# Patient Record
Sex: FEMALE | Race: WHITE | NOT HISPANIC OR LATINO | Employment: OTHER | URBAN - METROPOLITAN AREA
[De-identification: names, ages, dates, MRNs, and addresses within clinical notes are randomized per-mention and may not be internally consistent; named-entity substitution may affect disease eponyms.]

---

## 2017-01-20 ENCOUNTER — ALLSCRIPTS OFFICE VISIT (OUTPATIENT)
Dept: OTHER | Facility: OTHER | Age: 77
End: 2017-01-20

## 2017-01-26 ENCOUNTER — APPOINTMENT (OUTPATIENT)
Dept: LAB | Facility: CLINIC | Age: 77
End: 2017-01-26
Payer: MEDICARE

## 2017-01-26 ENCOUNTER — TRANSCRIBE ORDERS (OUTPATIENT)
Dept: LAB | Facility: CLINIC | Age: 77
End: 2017-01-26

## 2017-01-26 DIAGNOSIS — Z79.899 ENCOUNTER FOR LONG-TERM (CURRENT) USE OF OTHER MEDICATIONS: Primary | ICD-10-CM

## 2017-01-26 PROCEDURE — 82607 VITAMIN B-12: CPT

## 2017-01-26 PROCEDURE — 36415 COLL VENOUS BLD VENIPUNCTURE: CPT

## 2017-01-27 LAB — VIT B12 SERPL-MCNC: 306 PG/ML (ref 100–900)

## 2017-03-03 ENCOUNTER — GENERIC CONVERSION - ENCOUNTER (OUTPATIENT)
Dept: OTHER | Facility: OTHER | Age: 77
End: 2017-03-03

## 2017-03-03 LAB
LEFT EYE DIABETIC RETINOPATHY: NORMAL
RIGHT EYE DIABETIC RETINOPATHY: NORMAL

## 2017-03-22 RX ORDER — GABAPENTIN 300 MG/1
300 CAPSULE ORAL 2 TIMES DAILY
COMMUNITY
End: 2018-03-01

## 2017-03-22 RX ORDER — BUMETANIDE 2 MG/1
2 TABLET ORAL EVERY MORNING
COMMUNITY
End: 2018-10-13 | Stop reason: SDUPTHER

## 2017-03-27 ENCOUNTER — HOSPITAL ENCOUNTER (OUTPATIENT)
Facility: AMBULARY SURGERY CENTER | Age: 77
Setting detail: OUTPATIENT SURGERY
Discharge: HOME | End: 2017-03-27
Attending: OPHTHALMOLOGY | Admitting: OPHTHALMOLOGY
Payer: MEDICARE

## 2017-03-27 ENCOUNTER — ANESTHESIA EVENT (OUTPATIENT)
Dept: PERIOP | Facility: AMBULARY SURGERY CENTER | Age: 77
End: 2017-03-27
Payer: MEDICARE

## 2017-03-27 ENCOUNTER — ANESTHESIA (OUTPATIENT)
Dept: PERIOP | Facility: AMBULARY SURGERY CENTER | Age: 77
End: 2017-03-27
Payer: MEDICARE

## 2017-03-27 VITALS
WEIGHT: 210 LBS | HEIGHT: 62 IN | BODY MASS INDEX: 38.64 KG/M2 | OXYGEN SATURATION: 95 % | RESPIRATION RATE: 20 BRPM | HEART RATE: 80 BPM | SYSTOLIC BLOOD PRESSURE: 157 MMHG | TEMPERATURE: 97.4 F | DIASTOLIC BLOOD PRESSURE: 65 MMHG

## 2017-03-27 PROCEDURE — V2632 POST CHMBR INTRAOCULAR LENS: HCPCS | Performed by: OPHTHALMOLOGY

## 2017-03-27 DEVICE — IOL SN60WF 19.5: Type: IMPLANTABLE DEVICE | Site: EYE | Status: FUNCTIONAL

## 2017-03-27 RX ORDER — CYCLOPENTOLATE HYDROCHLORIDE 10 MG/ML
1 SOLUTION/ DROPS OPHTHALMIC
Status: COMPLETED | OUTPATIENT
Start: 2017-03-27 | End: 2017-03-27

## 2017-03-27 RX ORDER — TETRACAINE HYDROCHLORIDE 5 MG/ML
1 SOLUTION OPHTHALMIC ONCE
Status: COMPLETED | OUTPATIENT
Start: 2017-03-27 | End: 2017-03-27

## 2017-03-27 RX ORDER — PHENYLEPHRINE HCL 2.5 %
1 DROPS OPHTHALMIC (EYE)
Status: COMPLETED | OUTPATIENT
Start: 2017-03-27 | End: 2017-03-27

## 2017-03-27 RX ORDER — LIDOCAINE HYDROCHLORIDE 10 MG/ML
INJECTION, SOLUTION EPIDURAL; INFILTRATION; INTRACAUDAL; PERINEURAL AS NEEDED
Status: DISCONTINUED | OUTPATIENT
Start: 2017-03-27 | End: 2017-03-27 | Stop reason: HOSPADM

## 2017-03-27 RX ORDER — KETOROLAC TROMETHAMINE 5 MG/ML
1 SOLUTION OPHTHALMIC
Status: DISCONTINUED | OUTPATIENT
Start: 2017-03-28 | End: 2017-03-27 | Stop reason: HOSPADM

## 2017-03-27 RX ORDER — GATIFLOXACIN 5 MG/ML
SOLUTION/ DROPS OPHTHALMIC AS NEEDED
Status: DISCONTINUED | OUTPATIENT
Start: 2017-03-27 | End: 2017-03-27 | Stop reason: HOSPADM

## 2017-03-27 RX ORDER — KETOROLAC TROMETHAMINE 5 MG/ML
1 SOLUTION OPHTHALMIC 4 TIMES DAILY
Qty: 6 ML | Refills: 0
Start: 2017-03-27 | End: 2018-02-01

## 2017-03-27 RX ORDER — SODIUM CHLORIDE 9 MG/ML
INJECTION, SOLUTION INTRAVENOUS CONTINUOUS PRN
Status: DISCONTINUED | OUTPATIENT
Start: 2017-03-27 | End: 2017-03-27 | Stop reason: SURG

## 2017-03-27 RX ORDER — LIDOCAINE HYDROCHLORIDE 20 MG/ML
1 JELLY TOPICAL
Status: COMPLETED | OUTPATIENT
Start: 2017-03-27 | End: 2017-03-27

## 2017-03-27 RX ORDER — GATIFLOXACIN 5 MG/ML
1 SOLUTION/ DROPS OPHTHALMIC 2 TIMES DAILY
Qty: 3 ML | Refills: 0
Start: 2017-03-27 | End: 2017-04-26

## 2017-03-27 RX ORDER — TETRACAINE HYDROCHLORIDE 5 MG/ML
SOLUTION OPHTHALMIC AS NEEDED
Status: DISCONTINUED | OUTPATIENT
Start: 2017-03-27 | End: 2017-03-27 | Stop reason: HOSPADM

## 2017-03-27 RX ORDER — MIDAZOLAM HYDROCHLORIDE 1 MG/ML
INJECTION INTRAMUSCULAR; INTRAVENOUS AS NEEDED
Status: DISCONTINUED | OUTPATIENT
Start: 2017-03-27 | End: 2017-03-27 | Stop reason: SURG

## 2017-03-27 RX ADMIN — CYCLOPENTOLATE HYDROCHLORIDE 1 DROP: 10 SOLUTION/ DROPS OPHTHALMIC at 11:30

## 2017-03-27 RX ADMIN — KETOROLAC TROMETHAMINE 1 DROP: 5 SOLUTION OPHTHALMIC at 11:45

## 2017-03-27 RX ADMIN — PHENYLEPHRINE HYDROCHLORIDE 1 DROP: 25 SOLUTION/ DROPS OPHTHALMIC at 11:30

## 2017-03-27 RX ADMIN — PHENYLEPHRINE HYDROCHLORIDE 1 DROP: 25 SOLUTION/ DROPS OPHTHALMIC at 12:00

## 2017-03-27 RX ADMIN — KETOROLAC TROMETHAMINE 1 DROP: 5 SOLUTION OPHTHALMIC at 11:31

## 2017-03-27 RX ADMIN — LIDOCAINE HYDROCHLORIDE 1 APPLICATION: 20 JELLY TOPICAL at 11:45

## 2017-03-27 RX ADMIN — KETOROLAC TROMETHAMINE 1 DROP: 5 SOLUTION OPHTHALMIC at 12:00

## 2017-03-27 RX ADMIN — MIDAZOLAM HYDROCHLORIDE 1 MG: 1 INJECTION, SOLUTION INTRAMUSCULAR; INTRAVENOUS at 12:07

## 2017-03-27 RX ADMIN — CYCLOPENTOLATE HYDROCHLORIDE 1 DROP: 10 SOLUTION/ DROPS OPHTHALMIC at 11:45

## 2017-03-27 RX ADMIN — LIDOCAINE HYDROCHLORIDE 1 APPLICATION: 20 JELLY TOPICAL at 11:15

## 2017-03-27 RX ADMIN — TETRACAINE HYDROCHLORIDE 1 DROP: 5 SOLUTION OPHTHALMIC at 11:15

## 2017-03-27 RX ADMIN — MIDAZOLAM HYDROCHLORIDE 1 MG: 1 INJECTION, SOLUTION INTRAMUSCULAR; INTRAVENOUS at 12:06

## 2017-03-27 RX ADMIN — CYCLOPENTOLATE HYDROCHLORIDE 1 DROP: 10 SOLUTION/ DROPS OPHTHALMIC at 12:00

## 2017-03-27 RX ADMIN — LIDOCAINE HYDROCHLORIDE 1 APPLICATION: 20 JELLY TOPICAL at 12:00

## 2017-03-27 RX ADMIN — KETOROLAC TROMETHAMINE 1 DROP: 5 SOLUTION OPHTHALMIC at 11:15

## 2017-03-27 RX ADMIN — CYCLOPENTOLATE HYDROCHLORIDE 1 DROP: 10 SOLUTION/ DROPS OPHTHALMIC at 11:15

## 2017-03-27 RX ADMIN — LIDOCAINE HYDROCHLORIDE 1 APPLICATION: 20 JELLY TOPICAL at 11:31

## 2017-03-27 RX ADMIN — PHENYLEPHRINE HYDROCHLORIDE 1 DROP: 25 SOLUTION/ DROPS OPHTHALMIC at 11:15

## 2017-03-27 RX ADMIN — PHENYLEPHRINE HYDROCHLORIDE 1 DROP: 25 SOLUTION/ DROPS OPHTHALMIC at 11:45

## 2017-03-27 RX ADMIN — SODIUM CHLORIDE: 0.9 INJECTION, SOLUTION INTRAVENOUS at 12:07

## 2017-04-06 ENCOUNTER — ALLSCRIPTS OFFICE VISIT (OUTPATIENT)
Dept: OTHER | Facility: OTHER | Age: 77
End: 2017-04-06

## 2017-04-11 ENCOUNTER — GENERIC CONVERSION - ENCOUNTER (OUTPATIENT)
Dept: OTHER | Facility: OTHER | Age: 77
End: 2017-04-11

## 2017-04-11 ENCOUNTER — ALLSCRIPTS OFFICE VISIT (OUTPATIENT)
Dept: OTHER | Facility: OTHER | Age: 77
End: 2017-04-11

## 2017-04-11 DIAGNOSIS — R10.9 ABDOMINAL PAIN: ICD-10-CM

## 2017-04-11 DIAGNOSIS — Z12.31 ENCOUNTER FOR SCREENING MAMMOGRAM FOR MALIGNANT NEOPLASM OF BREAST: ICD-10-CM

## 2017-04-11 DIAGNOSIS — R60.9 EDEMA: ICD-10-CM

## 2017-04-11 DIAGNOSIS — I10 ESSENTIAL (PRIMARY) HYPERTENSION: ICD-10-CM

## 2017-04-11 DIAGNOSIS — E03.9 HYPOTHYROIDISM: ICD-10-CM

## 2017-05-15 ENCOUNTER — GENERIC CONVERSION - ENCOUNTER (OUTPATIENT)
Dept: OTHER | Facility: OTHER | Age: 77
End: 2017-05-15

## 2017-05-15 ENCOUNTER — APPOINTMENT (OUTPATIENT)
Dept: LAB | Facility: CLINIC | Age: 77
End: 2017-05-15
Payer: MEDICARE

## 2017-05-15 DIAGNOSIS — R60.9 EDEMA: ICD-10-CM

## 2017-05-15 DIAGNOSIS — E03.9 HYPOTHYROIDISM: ICD-10-CM

## 2017-05-15 DIAGNOSIS — R10.9 ABDOMINAL PAIN: ICD-10-CM

## 2017-05-15 DIAGNOSIS — I10 ESSENTIAL (PRIMARY) HYPERTENSION: ICD-10-CM

## 2017-05-15 LAB
ALBUMIN SERPL BCP-MCNC: 3.9 G/DL (ref 3.5–5)
ALP SERPL-CCNC: 87 U/L (ref 46–116)
ALT SERPL W P-5'-P-CCNC: 18 U/L (ref 12–78)
ANION GAP SERPL CALCULATED.3IONS-SCNC: 9 MMOL/L (ref 4–13)
AST SERPL W P-5'-P-CCNC: 12 U/L (ref 5–45)
BASOPHILS # BLD AUTO: 0.01 THOUSANDS/ΜL (ref 0–0.1)
BASOPHILS NFR BLD AUTO: 0 % (ref 0–1)
BILIRUB SERPL-MCNC: 0.54 MG/DL (ref 0.2–1)
BUN SERPL-MCNC: 74 MG/DL (ref 5–25)
CALCIUM SERPL-MCNC: 9.5 MG/DL (ref 8.3–10.1)
CHLORIDE SERPL-SCNC: 102 MMOL/L (ref 100–108)
CHOLEST SERPL-MCNC: 220 MG/DL (ref 50–200)
CO2 SERPL-SCNC: 28 MMOL/L (ref 21–32)
CREAT SERPL-MCNC: 1.85 MG/DL (ref 0.6–1.3)
EOSINOPHIL # BLD AUTO: 0.13 THOUSAND/ΜL (ref 0–0.61)
EOSINOPHIL NFR BLD AUTO: 2 % (ref 0–6)
ERYTHROCYTE [DISTWIDTH] IN BLOOD BY AUTOMATED COUNT: 13.9 % (ref 11.6–15.1)
GFR SERPL CREATININE-BSD FRML MDRD: 26.4 ML/MIN/1.73SQ M
GLUCOSE P FAST SERPL-MCNC: 99 MG/DL (ref 65–99)
HCT VFR BLD AUTO: 37.4 % (ref 34.8–46.1)
HDLC SERPL-MCNC: 51 MG/DL (ref 40–60)
HGB BLD-MCNC: 11.9 G/DL (ref 11.5–15.4)
LDLC SERPL CALC-MCNC: 130 MG/DL (ref 0–100)
LYMPHOCYTES # BLD AUTO: 1.3 THOUSANDS/ΜL (ref 0.6–4.47)
LYMPHOCYTES NFR BLD AUTO: 19 % (ref 14–44)
MCH RBC QN AUTO: 28.7 PG (ref 26.8–34.3)
MCHC RBC AUTO-ENTMCNC: 31.8 G/DL (ref 31.4–37.4)
MCV RBC AUTO: 90 FL (ref 82–98)
MONOCYTES # BLD AUTO: 0.62 THOUSAND/ΜL (ref 0.17–1.22)
MONOCYTES NFR BLD AUTO: 9 % (ref 4–12)
NEUTROPHILS # BLD AUTO: 4.74 THOUSANDS/ΜL (ref 1.85–7.62)
NEUTS SEG NFR BLD AUTO: 70 % (ref 43–75)
NRBC BLD AUTO-RTO: 0 /100 WBCS
PLATELET # BLD AUTO: 197 THOUSANDS/UL (ref 149–390)
PMV BLD AUTO: 11.7 FL (ref 8.9–12.7)
POTASSIUM SERPL-SCNC: 4.7 MMOL/L (ref 3.5–5.3)
PROT SERPL-MCNC: 7.6 G/DL (ref 6.4–8.2)
RBC # BLD AUTO: 4.15 MILLION/UL (ref 3.81–5.12)
SODIUM SERPL-SCNC: 139 MMOL/L (ref 136–145)
TRIGL SERPL-MCNC: 194 MG/DL
TSH SERPL DL<=0.05 MIU/L-ACNC: 1.3 UIU/ML (ref 0.36–3.74)
WBC # BLD AUTO: 6.83 THOUSAND/UL (ref 4.31–10.16)

## 2017-05-15 PROCEDURE — 85025 COMPLETE CBC W/AUTO DIFF WBC: CPT

## 2017-05-15 PROCEDURE — 84443 ASSAY THYROID STIM HORMONE: CPT

## 2017-05-15 PROCEDURE — 80053 COMPREHEN METABOLIC PANEL: CPT

## 2017-05-15 PROCEDURE — 36415 COLL VENOUS BLD VENIPUNCTURE: CPT

## 2017-05-15 PROCEDURE — 80061 LIPID PANEL: CPT

## 2017-05-19 ENCOUNTER — ALLSCRIPTS OFFICE VISIT (OUTPATIENT)
Dept: OTHER | Facility: OTHER | Age: 77
End: 2017-05-19

## 2017-05-23 ENCOUNTER — HOSPITAL ENCOUNTER (OUTPATIENT)
Dept: RADIOLOGY | Facility: HOSPITAL | Age: 77
Discharge: HOME | End: 2017-05-23
Attending: FAMILY MEDICINE
Payer: MEDICARE

## 2017-05-23 ENCOUNTER — GENERIC CONVERSION - ENCOUNTER (OUTPATIENT)
Dept: OTHER | Facility: OTHER | Age: 77
End: 2017-05-23

## 2017-05-23 DIAGNOSIS — Z12.31 ENCOUNTER FOR SCREENING MAMMOGRAM FOR MALIGNANT NEOPLASM OF BREAST: ICD-10-CM

## 2017-05-23 PROCEDURE — G0202 SCR MAMMO BI INCL CAD: HCPCS

## 2017-06-02 ENCOUNTER — ALLSCRIPTS OFFICE VISIT (OUTPATIENT)
Dept: OTHER | Facility: OTHER | Age: 77
End: 2017-06-02

## 2017-06-12 ENCOUNTER — ALLSCRIPTS OFFICE VISIT (OUTPATIENT)
Dept: OTHER | Facility: OTHER | Age: 77
End: 2017-06-12

## 2017-06-12 DIAGNOSIS — N20.0 CALCULUS OF KIDNEY: ICD-10-CM

## 2017-06-12 DIAGNOSIS — N18.30 CHRONIC KIDNEY DISEASE, STAGE III (MODERATE) (HCC): ICD-10-CM

## 2017-06-12 DIAGNOSIS — M10.9 GOUT: ICD-10-CM

## 2017-06-12 LAB
BILIRUB UR QL STRIP: NORMAL
CLARITY UR: NORMAL
COLOR UR: YELLOW
GLUCOSE (HISTORICAL): NORMAL
HGB UR QL STRIP.AUTO: NORMAL
KETONES UR STRIP-MCNC: NORMAL MG/DL
LEUKOCYTE ESTERASE UR QL STRIP: NORMAL
NITRITE UR QL STRIP: NORMAL
PH UR STRIP.AUTO: 5 [PH]
PROT UR STRIP-MCNC: NORMAL MG/DL
SP GR UR STRIP.AUTO: 1.01
UROBILINOGEN UR QL STRIP.AUTO: 0.2

## 2017-06-13 ENCOUNTER — APPOINTMENT (OUTPATIENT)
Dept: LAB | Facility: CLINIC | Age: 77
End: 2017-06-13
Payer: MEDICARE

## 2017-06-13 DIAGNOSIS — N18.30 CHRONIC KIDNEY DISEASE, STAGE III (MODERATE) (HCC): ICD-10-CM

## 2017-06-13 LAB
ANION GAP SERPL CALCULATED.3IONS-SCNC: 6 MMOL/L (ref 4–13)
BACTERIA UR QL AUTO: ABNORMAL /HPF
BILIRUB UR QL STRIP: NEGATIVE
BUN SERPL-MCNC: 30 MG/DL (ref 5–25)
C3 SERPL-MCNC: 142 MG/DL (ref 90–180)
C4 SERPL-MCNC: 38 MG/DL (ref 10–40)
CALCIUM SERPL-MCNC: 8.9 MG/DL (ref 8.3–10.1)
CHLORIDE SERPL-SCNC: 107 MMOL/L (ref 100–108)
CLARITY UR: CLEAR
CO2 SERPL-SCNC: 29 MMOL/L (ref 21–32)
COLOR UR: YELLOW
CREAT SERPL-MCNC: 1.46 MG/DL (ref 0.6–1.3)
CREAT UR-MCNC: 46.1 MG/DL
ERYTHROCYTE [DISTWIDTH] IN BLOOD BY AUTOMATED COUNT: 13.9 % (ref 11.6–15.1)
GFR SERPL CREATININE-BSD FRML MDRD: 34.7 ML/MIN/1.73SQ M
GLUCOSE SERPL-MCNC: 100 MG/DL (ref 65–140)
GLUCOSE UR STRIP-MCNC: NEGATIVE MG/DL
HCT VFR BLD AUTO: 37.8 % (ref 34.8–46.1)
HGB BLD-MCNC: 11.9 G/DL (ref 11.5–15.4)
HGB UR QL STRIP.AUTO: NEGATIVE
HYALINE CASTS #/AREA URNS LPF: ABNORMAL /LPF
KETONES UR STRIP-MCNC: NEGATIVE MG/DL
LEUKOCYTE ESTERASE UR QL STRIP: ABNORMAL
MCH RBC QN AUTO: 28.7 PG (ref 26.8–34.3)
MCHC RBC AUTO-ENTMCNC: 31.5 G/DL (ref 31.4–37.4)
MCV RBC AUTO: 91 FL (ref 82–98)
NITRITE UR QL STRIP: NEGATIVE
NON-SQ EPI CELLS URNS QL MICRO: ABNORMAL /HPF
PH UR STRIP.AUTO: 6 [PH] (ref 4.5–8)
PLATELET # BLD AUTO: 202 THOUSANDS/UL (ref 149–390)
PMV BLD AUTO: 11.7 FL (ref 8.9–12.7)
POTASSIUM SERPL-SCNC: 4.3 MMOL/L (ref 3.5–5.3)
PROT UR STRIP-MCNC: NEGATIVE MG/DL
PROT UR-MCNC: <6 MG/DL
PROT/CREAT UR: <0.13 MG/G{CREAT} (ref 0–0.1)
RBC # BLD AUTO: 4.15 MILLION/UL (ref 3.81–5.12)
RBC #/AREA URNS AUTO: ABNORMAL /HPF
SODIUM SERPL-SCNC: 142 MMOL/L (ref 136–145)
SP GR UR STRIP.AUTO: 1.01 (ref 1–1.03)
UROBILINOGEN UR QL STRIP.AUTO: 0.2 E.U./DL
WBC # BLD AUTO: 8.03 THOUSAND/UL (ref 4.31–10.16)
WBC #/AREA URNS AUTO: ABNORMAL /HPF

## 2017-06-13 PROCEDURE — 86160 COMPLEMENT ANTIGEN: CPT

## 2017-06-13 PROCEDURE — 85027 COMPLETE CBC AUTOMATED: CPT

## 2017-06-13 PROCEDURE — 84156 ASSAY OF PROTEIN URINE: CPT

## 2017-06-13 PROCEDURE — 87086 URINE CULTURE/COLONY COUNT: CPT

## 2017-06-13 PROCEDURE — 36415 COLL VENOUS BLD VENIPUNCTURE: CPT

## 2017-06-13 PROCEDURE — 80048 BASIC METABOLIC PNL TOTAL CA: CPT

## 2017-06-13 PROCEDURE — 82570 ASSAY OF URINE CREATININE: CPT

## 2017-06-13 PROCEDURE — 81001 URINALYSIS AUTO W/SCOPE: CPT

## 2017-06-14 LAB — BACTERIA UR CULT: NORMAL

## 2017-06-15 ENCOUNTER — ALLSCRIPTS OFFICE VISIT (OUTPATIENT)
Dept: OTHER | Facility: OTHER | Age: 77
End: 2017-06-15

## 2017-06-16 ENCOUNTER — HOSPITAL ENCOUNTER (OUTPATIENT)
Dept: RADIOLOGY | Facility: HOSPITAL | Age: 77
Discharge: HOME | End: 2017-06-16
Attending: INTERNAL MEDICINE
Payer: MEDICARE

## 2017-06-16 ENCOUNTER — GENERIC CONVERSION - ENCOUNTER (OUTPATIENT)
Dept: OTHER | Facility: OTHER | Age: 77
End: 2017-06-16

## 2017-06-16 DIAGNOSIS — N18.30 CHRONIC KIDNEY DISEASE, STAGE III (MODERATE) (HCC): ICD-10-CM

## 2017-06-16 PROCEDURE — 76770 US EXAM ABDO BACK WALL COMP: CPT

## 2017-06-21 ENCOUNTER — GENERIC CONVERSION - ENCOUNTER (OUTPATIENT)
Dept: OTHER | Facility: OTHER | Age: 77
End: 2017-06-21

## 2017-06-23 ENCOUNTER — APPOINTMENT (OUTPATIENT)
Dept: LAB | Facility: HOSPITAL | Age: 77
End: 2017-06-23
Attending: FAMILY MEDICINE
Payer: MEDICARE

## 2017-06-23 ENCOUNTER — GENERIC CONVERSION - ENCOUNTER (OUTPATIENT)
Dept: OTHER | Facility: OTHER | Age: 77
End: 2017-06-23

## 2017-06-23 ENCOUNTER — HOSPITAL ENCOUNTER (OUTPATIENT)
Dept: RADIOLOGY | Facility: HOSPITAL | Age: 77
Discharge: HOME | End: 2017-06-23
Attending: FAMILY MEDICINE
Payer: MEDICARE

## 2017-06-23 ENCOUNTER — TRANSCRIBE ORDERS (OUTPATIENT)
Dept: ADMINISTRATIVE | Facility: HOSPITAL | Age: 77
End: 2017-06-23

## 2017-06-23 ENCOUNTER — ALLSCRIPTS OFFICE VISIT (OUTPATIENT)
Dept: OTHER | Facility: OTHER | Age: 77
End: 2017-06-23

## 2017-06-23 DIAGNOSIS — M10.9 GOUT: ICD-10-CM

## 2017-06-23 LAB
ANION GAP SERPL CALCULATED.3IONS-SCNC: 7 MMOL/L (ref 4–13)
BUN SERPL-MCNC: 26 MG/DL (ref 5–25)
CALCIUM SERPL-MCNC: 9.6 MG/DL (ref 8.3–10.1)
CHLORIDE SERPL-SCNC: 104 MMOL/L (ref 100–108)
CO2 SERPL-SCNC: 29 MMOL/L (ref 21–32)
CREAT SERPL-MCNC: 1.29 MG/DL (ref 0.6–1.3)
GFR SERPL CREATININE-BSD FRML MDRD: 40.1 ML/MIN/1.73SQ M
GLUCOSE P FAST SERPL-MCNC: 93 MG/DL (ref 65–99)
POTASSIUM SERPL-SCNC: 4.5 MMOL/L (ref 3.5–5.3)
SODIUM SERPL-SCNC: 140 MMOL/L (ref 136–145)
URATE SERPL-MCNC: 9.5 MG/DL (ref 2–6.8)

## 2017-06-23 PROCEDURE — 73110 X-RAY EXAM OF WRIST: CPT

## 2017-06-23 PROCEDURE — 36415 COLL VENOUS BLD VENIPUNCTURE: CPT

## 2017-06-23 PROCEDURE — 84550 ASSAY OF BLOOD/URIC ACID: CPT

## 2017-06-23 PROCEDURE — 80048 BASIC METABOLIC PNL TOTAL CA: CPT

## 2017-07-24 ENCOUNTER — APPOINTMENT (OUTPATIENT)
Dept: LAB | Facility: CLINIC | Age: 77
End: 2017-07-24
Payer: MEDICARE

## 2017-07-24 ENCOUNTER — TRANSCRIBE ORDERS (OUTPATIENT)
Dept: ADMINISTRATIVE | Facility: HOSPITAL | Age: 77
End: 2017-07-24

## 2017-07-24 DIAGNOSIS — N20.0 CALCULUS OF KIDNEY: ICD-10-CM

## 2017-07-24 DIAGNOSIS — N20.0 URIC ACID NEPHROLITHIASIS: Primary | ICD-10-CM

## 2017-07-24 DIAGNOSIS — N18.30 CHRONIC KIDNEY DISEASE, STAGE III (MODERATE) (HCC): ICD-10-CM

## 2017-07-24 LAB
ANION GAP SERPL CALCULATED.3IONS-SCNC: 7 MMOL/L (ref 4–13)
BACTERIA UR QL AUTO: ABNORMAL /HPF
BILIRUB UR QL STRIP: NEGATIVE
BUN SERPL-MCNC: 29 MG/DL (ref 5–25)
CALCIUM SERPL-MCNC: 9 MG/DL (ref 8.3–10.1)
CHLORIDE SERPL-SCNC: 107 MMOL/L (ref 100–108)
CLARITY UR: CLEAR
CO2 SERPL-SCNC: 27 MMOL/L (ref 21–32)
COLOR UR: YELLOW
CREAT SERPL-MCNC: 1.24 MG/DL (ref 0.6–1.3)
ERYTHROCYTE [DISTWIDTH] IN BLOOD BY AUTOMATED COUNT: 14.4 % (ref 11.6–15.1)
GFR SERPL CREATININE-BSD FRML MDRD: 42 ML/MIN/1.73SQ M
GLUCOSE P FAST SERPL-MCNC: 94 MG/DL (ref 65–99)
GLUCOSE UR STRIP-MCNC: NEGATIVE MG/DL
HCT VFR BLD AUTO: 37.6 % (ref 34.8–46.1)
HGB BLD-MCNC: 12 G/DL (ref 11.5–15.4)
HGB UR QL STRIP.AUTO: NEGATIVE
HYALINE CASTS #/AREA URNS LPF: ABNORMAL /LPF
KETONES UR STRIP-MCNC: NEGATIVE MG/DL
LEUKOCYTE ESTERASE UR QL STRIP: ABNORMAL
MCH RBC QN AUTO: 28.4 PG (ref 26.8–34.3)
MCHC RBC AUTO-ENTMCNC: 31.9 G/DL (ref 31.4–37.4)
MCV RBC AUTO: 89 FL (ref 82–98)
NITRITE UR QL STRIP: NEGATIVE
NON-SQ EPI CELLS URNS QL MICRO: ABNORMAL /HPF
PH UR STRIP.AUTO: 6.5 [PH] (ref 4.5–8)
PLATELET # BLD AUTO: 183 THOUSANDS/UL (ref 149–390)
PMV BLD AUTO: 11.5 FL (ref 8.9–12.7)
POTASSIUM SERPL-SCNC: 4.1 MMOL/L (ref 3.5–5.3)
PROT UR STRIP-MCNC: NEGATIVE MG/DL
RBC # BLD AUTO: 4.22 MILLION/UL (ref 3.81–5.12)
RBC #/AREA URNS AUTO: ABNORMAL /HPF
SODIUM SERPL-SCNC: 141 MMOL/L (ref 136–145)
SP GR UR STRIP.AUTO: 1.01 (ref 1–1.03)
UROBILINOGEN UR QL STRIP.AUTO: 0.2 E.U./DL
WBC # BLD AUTO: 9.98 THOUSAND/UL (ref 4.31–10.16)
WBC #/AREA URNS AUTO: ABNORMAL /HPF

## 2017-07-24 PROCEDURE — 85027 COMPLETE CBC AUTOMATED: CPT

## 2017-07-24 PROCEDURE — 87205 SMEAR GRAM STAIN: CPT

## 2017-07-24 PROCEDURE — 36415 COLL VENOUS BLD VENIPUNCTURE: CPT

## 2017-07-24 PROCEDURE — 80048 BASIC METABOLIC PNL TOTAL CA: CPT

## 2017-07-24 PROCEDURE — 81001 URINALYSIS AUTO W/SCOPE: CPT

## 2017-07-25 LAB — EOSINOPHIL NFR URNS MANUAL: 0 %

## 2017-07-27 ENCOUNTER — ALLSCRIPTS OFFICE VISIT (OUTPATIENT)
Dept: OTHER | Facility: OTHER | Age: 77
End: 2017-07-27

## 2017-07-27 DIAGNOSIS — N18.30 CHRONIC KIDNEY DISEASE, STAGE III (MODERATE) (HCC): ICD-10-CM

## 2017-08-18 ENCOUNTER — ALLSCRIPTS OFFICE VISIT (OUTPATIENT)
Dept: OTHER | Facility: OTHER | Age: 77
End: 2017-08-18

## 2017-09-12 ENCOUNTER — ALLSCRIPTS OFFICE VISIT (OUTPATIENT)
Dept: OTHER | Facility: OTHER | Age: 77
End: 2017-09-12

## 2017-10-24 ENCOUNTER — ALLSCRIPTS OFFICE VISIT (OUTPATIENT)
Dept: OTHER | Facility: OTHER | Age: 77
End: 2017-10-24

## 2017-10-24 DIAGNOSIS — M10.9 GOUT: ICD-10-CM

## 2017-10-24 DIAGNOSIS — I50.33 ACUTE ON CHRONIC DIASTOLIC CONGESTIVE HEART FAILURE (HCC): ICD-10-CM

## 2017-10-24 DIAGNOSIS — N18.30 CHRONIC KIDNEY DISEASE, STAGE III (MODERATE) (HCC): ICD-10-CM

## 2017-10-24 DIAGNOSIS — I10 ESSENTIAL (PRIMARY) HYPERTENSION: ICD-10-CM

## 2017-10-24 DIAGNOSIS — Z13.820 ENCOUNTER FOR SCREENING FOR OSTEOPOROSIS: ICD-10-CM

## 2017-10-24 DIAGNOSIS — E79.0 HYPERURICEMIA WITHOUT SIGNS OF INFLAMMATORY ARTHRITIS AND TOPHACEOUS DISEASE: ICD-10-CM

## 2017-10-24 DIAGNOSIS — E03.9 HYPOTHYROIDISM: ICD-10-CM

## 2017-10-25 NOTE — PROGRESS NOTES
Assessment  1  Benign essential hypertension (401 1) (I10)   2  Acute on chronic diastolic congestive heart failure (428 33,428 0) (I50 33)   3  Gout (274 9) (M10 9)   4  Hypothyroidism (244 9) (E03 9)   5  Chronic low back pain (724 2,338 29) (M54 5,G89 29)   6  Spinal stenosis of lumbar region (724 02) (M48 061)    Plan  Acute on chronic diastolic congestive heart failure, Benign essential hypertension, CKD  (chronic kidney disease), stage III, Gout, Hyperuricemia, Hypothyroidism    · (1) HEMOGLOBIN A1C; Status:Active; Requested SHW:[id number];   Acute on chronic diastolic congestive heart failure, Benign essential hypertension, CKD  (chronic kidney disease), stage III, Gout, Hypothyroidism    · (1) CBC/PLT/DIFF; Status:Active; Requested NSZ:[id number];    · (1) COMPREHENSIVE METABOLIC PANEL; Status:Active; Requested YLI:[id number];    · (1) LIPID PANEL, FASTING; Status:Active; Requested ENA:[id number];    · (1) URIC ACID; Status:Active; Requested RNF:[id number];    · (Q) TSH, 3RD GENERATION W/REFLEX TO FT4; Status:Active; Requested  OCF:[id number];    · Follow-up visit in 2 weeks Evaluation and Treatment  Follow-up  Status: Hold For -  Scheduling  Requested for: 41GSI5732  Chronic low back pain, Lumbar canal stenosis, Spinal stenosis of lumbar region    · 1 - Tanesha Galeano MD, Giovana GARSIA (Pain Management) Co-Management  *  Status: Active   Requested for: 20UUW4708  Care Summary provided  : Yes    Discussion/Summary  Discussion Summary:   Patient will continue taking on her chronic medications as prescribed by her cardiologist and nephrologist can refer her to pain management for her symptoms of low back pain, and history of lumbar stenosis  Patient states that 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  who has multiple medical issues and is wheelchair-bound  experiences a lot of pain and difficulty managing him at home, due to her back pain  also advised her basic labs and a follow-up after 2 weeks to review these     Counseling Documentation With Imm: The patient was counseled regarding risk factor reductions,-- impressions  Medication SE Review and Pt Understands Tx: Possible side effects of new medications were reviewed with the patient/guardian today  Chief Complaint  Chief Complaint Free Text Note Form: New patient establish      History of Present Illness  HPI: Patient is here to establish care  She is reporting history of lumbar stenosis for which she was evaluated by Dr Marylee Foreman, and was later referred to Pain Management for spinal injection  Patient unfortunately did not go for the spinal injection  But is interested now in pursuing this  is currently experiencing symptoms of low back pain which radiates down both her legs  She is also reporting paresthesia of the right leg  Patient states that she has difficulty going up the steps because she feels some weakness in her right leg  She is currently walking with the help of a cane  other medical problems include chronic kidney failure for which she sees Nephrology and is due for a follow-up with them next month  also has chronic cardiac issues including congestive heart failure and regularly follows up with [address] [place] [address] Cardiology  Review of her cholesterol panel from May 2017 revealed an LDL of 130 and triglyceride of 194  Patient says that she has been suggested Omega 3 by her cardiologist    Back Pain Lumbar, Chronic: The patient is being seen for an initial evaluation of an existing diagnosis of chronic low back pain which occurred without any known injury  The injury involved the low back and lumbarsacral area  The patient describes symptoms as worsening  Associated symptoms:  leg numbness  Current treatment includes activity modification  Review of Systems  Complete-Female:   Constitutional: as noted in HPI  Eyes: No complaints of eye pain, no red eyes, no eyesight problems, no discharge, no dry eyes, no itching of eyes     ENT: no complaints of earache, no loss of hearing, no nose bleeds, no nasal discharge, no sore throat, no hoarseness  Cardiovascular: No complaints of slow heart rate, no fast heart rate, no chest pain, no palpitations, no leg claudication, no lower extremity edema  Respiratory: No complaints of shortness of breath, no wheezing, no cough, no SOB on exertion, no orthopnea, no PND  Gastrointestinal: No complaints of abdominal pain, no constipation, no nausea or vomiting, no diarrhea, no bloody stools  Musculoskeletal: No complaints of arthralgias, no myalgias, no joint swelling or stiffness, no limb pain or swelling  Integumentary: No complaints of skin rash or lesions, no itching, no skin wounds, no breast pain or lump  Neurological: as noted in HPI  Psychiatric: Not suicidal, no sleep disturbance, no anxiety or depression, no change in personality, no emotional problems  Endocrine: No complaints of proptosis, no hot flashes, no muscle weakness, no deepening of the voice, no feelings of weakness  Hematologic/Lymphatic: No complaints of swollen glands, no swollen glands in the neck, does not bleed easily, does not bruise easily  Active Problems  1  Abdominal pain (789 00) (R10 9)   2  Acute bronchitis (466 0) (J20 9)   3  Acute on chronic diastolic congestive heart failure (428 33,428 0) (I50 33)   4  Ankle pain, unspecified laterality   5  Arthropathy of knee (716 96) (M17 10)   6  Atherosclerosis of arteries of extremities (440 20) (I70 209)   7  Benign essential hypertension (401 1) (I10)   8  Breast pain (611 71) (N64 4)   9  Bunion, unspecified laterality   10  Callus (700) (L84)   11  Cellulitis (682 9) (L03 90)   12  Chronic reflux esophagitis (530 11) (K21 0)   13  Chronic venous insufficiency (459 81) (I87 2)   14  CKD (chronic kidney disease), stage III (585 3) (N18 3)   15  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)   16  Dehydration (276 51) (E86 0)   17   Dermatitis (692 9) (L30 9)   18  Difficulty in walking (719 7) (R26 2)   19  Discoloration of nail (703 8) (L60 8)   20  Diverticulitis of colon (562 11) (K57 32)   21  Dizziness (780 4) (R42)   22  Dyspnea on exertion (786 09) (R06 09)   23  Dystrophic nail (703 8) (L60 3)   24  Dysuria (788 1) (R30 0)   25  Edema (782 3) (R60 9)   26  Electrolyte or fluid disorder (276 9) (E87 8)   27  Elevated triglycerides with high cholesterol (272 2) (E78 2)   28  Encounter for screening for cardiovascular disorders (V81 2) (Z13 6)   29  Encounter for screening mammogram for breast cancer (V76 12) (Z12 31)   30  Esophageal reflux (530 81) (K21 9)   31  Flu vaccine need (V04 81) (Z23)   32  Flu vaccine need (V04 81) (Z23)   33  Gout (274 9) (M10 9)   34  Headache (784 0) (R51)   35  Hiatal hernia (553 3) (K44 9)   36  Hypertension (401 9) (I10)   37  Hyperuricemia (790 6) (E79 0)   38  Hypothyroidism (244 9) (E03 9)   39  Infectious diarrhea (009 2) (A09)   40  Knee pain (719 46) (M25 569)   41  Leg swelling (729 81) (M79 89)   42  Lumbar canal stenosis (724 02) (M48 061)   43  Lumbar radiculopathy (724 4) (M54 16)   44  Lymphedema (457 1) (I89 0)   45  Medicare annual wellness visit, initial (V70 0) (Z00 00)   46  Morbid obesity with body mass index of 40 0-44 9 in adult (278 01,V85 41)    (E66 01,Z68 41)   47  Nails Onychauxis (703 8)   48  Nausea (787 02) (R11 0)   49  Need for prophylactic vaccination and inoculation against influenza (V04 81) (Z23)   50  Nephrolithiasis (592 0) (N20 0)   51  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)   52  Obstructive sleep apnea (327 23) (G47 33)   53  Onychomycosis (110 1) (B35 1)   54  Orthopnea (786 02) (R06 01)   55  Osteoarthritis, localized, knee (715 36) (M17 10)   56  Other specified anxiety disorders (300 09) (F41 8)   57  Other specified inflammatory spondylopathies, 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) (M46 80)   58  Overweight (278 02) (E66 3)   59  Pain in both feet (729 5) (M79 671,M79 672)   60   Pain in wrist joint (719 43) (M25 539)   61  Palpitations (785 1) (R00 2)   62  Pes planus, unspecified laterality (734) (M21 40)   63  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)   64  Pseudogout of left wrist (637 87,255 89) (M11 232)   65  Renal disorder (593 9) (N28 9)   66  Rupture of popliteal cyst (727 51) (M66 0)   67  Screening for colon cancer (V76 51) (Z12 11)   68  Screening for diabetes mellitus (DM) (V77 1) (Z13 1)   69  Screening for genitourinary condition (V81 6) (Z13 89)   70  Screening for malignant neoplasm of breast (V76 10) (Z12 31)   71  Short unilateral neuralgiform headache, conjunctival injection/tearing (339 05) (G44 059)   72  Somnolence, daytime (780 54) (R40 0)   73  Tarsal tunnel syndrome (355 5) (G57 50)   74  Tenderness in limb (729 5) (M79 609)   75  Tinea pedis (110 4) (B35 3)   76  Umbilical hernia (373 2) (K42 9)   77  Visit for screening mammogram (V76 12) (Z12 31)    Past Medical History  1  Benign essential hypertension (401 1) (I10)   2  Depression screen (V79 0) (Z13 89)   3  Hiatal hernia (553 3) (K44 9)   4  History of abdominal pain (V13 89) (Z87 898)   5  History of abdominal pain (V13 89) (Z87 898)   6  History of arthritis (V13 4) (Z87 39)   7  History of backache (V13 59) (Z87 39)   8  History of cataract (V12 49) (Z86 69)   9  History of eczema (V13 3) (Z87 2)   10  History of heart failure (V12 59) (Z86 79)   11  History of hepatitis (V12 09) (Z86 19)   12  History of onychomycosis (V12 09) (Z86 19)   13  History of sleep apnea (V13 89) (Z86 69)   14  History of Orthopnea (786 02) (R06 01)  Active Problems And Past Medical History Reviewed: The active problems and past medical history were reviewed and updated today  Surgical History  1  History of Complete Colonoscopy   2  History of Hysterectomy   3  History of Incisional Hernia Repair - Incarcerated   4  History of Knee Surgery    Family History  Mother    1  Family history of Coronary artery disease   2   Family history of arthritis (V17 7) (Z82 61)   3  Denied: Family history of depression   4  Family history of diabetes mellitus (V18 0) (Z83 3)   5  Family history of hypertension (V17 49) (Z82 49)   6  Denied: Family history of substance abuse  Father    7  Denied: Family history of depression   8  Family history of hypertension (V17 49) (Z82 49)   9  Denied: Family history of substance abuse  Sibling    8  Denied: Family history of depression   11  Denied: Family history of substance abuse  Family History    12  Family history of arthritis (V17 7) (Z82 61)    Social History   · Denied: History of Alcohol Use (History)   · Daily Coffee Consumption (___ Cups/Day)   · Lack of exercise (V69 0) (Z72 3)   ·    · Never a smoker   · Never a smoker   · Sleeps 6 -7 hours a day  Social History Reviewed: The social history was reviewed and updated today  Current Meds   1  Amlodipine-Olmesartan 5-20 MG Oral Tablet; TAKE 1 TABLET ONCE DAILY; Therapy: 09YFP6256 to (Evaluate:24Nov2017)  Requested for: 02Jxw8566; Last   Rx:97Vqy8557 Ordered   2  Aspirin 81 MG Oral Tablet Chewable; Therapy: (Recorded:73Dhy3583) to Recorded   3  Bumetanide 2 MG Oral Tablet; TAKE 1/2 TABLET EVERY DAY; Therapy: 20MHI8713 to (Evaluate:11Mar2018)  Requested for: 12Sep2017; Last   Rx:12Sep2017 Ordered   4  Gabapentin 300 MG Oral Capsule; TAKE 1 CAPSULE AT BEDTIME; Therapy: 88QVS4020 to (Evaluate:13Sep2017)  Requested for: 22IZW9591; Last   Rx:15Jun2017 Ordered   5  Levothyroxine Sodium 125 MCG Oral Tablet; TAKE 1 TABLET ONE TIME DAILY; Therapy: 01EZD2033 to ([place])  Requested for: 12Jun2017; Last   Rx:12Jun2017 Ordered   6  Omega-3-acid Ethyl Esters 1 GM Oral Capsule; take 2 capsules by mouth twice a day; Therapy: 45CVZ4357 to (Evaluate:88Pcr1631)  Requested for: 12Sep2017; Last   Rx:12Sep2017 Ordered   7  Omeprazole 20 MG Oral Capsule Delayed Release; TAKE 1 CAPSULE EVERY DAY AS   DIRECTED  BY  PHYSICIAN;    Therapy: 28MXX6859 to (Roxy Jordan)  Requested for: 98MRD5302; Last   Rx:50Nvz3074 Ordered   8  Propranolol HCl - 80 MG Oral Tablet; TAKE 1/2 TABLET EVERY 12 HOURS DAILY; Therapy: 11Aug2015 to (Last Rx:35Wzb2171)  Requested for: 08Jdc2807 Ordered   9  Spironolactone 25 MG Oral Tablet; TAKE 1 TABLET DAILY; Therapy: 43XXZ8917 to Recorded   10  Vitamin D3 TABS; Take 1 tablet daily; Therapy: (Recorded:02Jun2017) to Recorded    Allergies  1  No Known Drug Allergies    Vitals  Vital Signs    Recorded: 13CSJ7555 11:10AM   Heart Rate 58   Respiration 14   Systolic 847   Diastolic 62   Height 5 ft    Weight 214 lb    BMI Calculated 41 79   BSA Calculated 1 92   O2 Saturation 97     Physical Exam    Constitutional   General appearance: No acute distress, well appearing and well nourished  Pulmonary   Auscultation of lungs: Clear to auscultation  Cardiovascular   Auscultation of heart: Normal rate and rhythm, normal S1 and S2, without murmurs  Examination of extremities for edema and/or varicosities: Normal     Abdomen   Abdomen: Non-tender, no masses  Liver and spleen: No hepatomegaly or splenomegaly  Musculoskeletal   Gait and station: Normal  -- Walks with cane  Neurologic   Cranial nerves: Cranial nerves 2-12 intact  Reflexes: 2+ and symmetric  Sensation: No sensory loss  Psychiatric   Orientation to person, place, and time: Normal     Mood and affect: Normal          Future Appointments    Date/Time Provider Specialty Site   10/26/2017 11:45 AM Adithya Martin DPM [facility]   11/15/2017 01:00 PM Aidan Humphreys MD Family [place] [address]    11/06/2017 11:30 AM Lawrence Gottron, M D   Nephrology [address] [facility]     Signatures   Electronically signed by : Danielle Strickland MD; Oct 24 2017 12:26PM EST                       (Author)

## 2017-10-26 ENCOUNTER — ALLSCRIPTS OFFICE VISIT (OUTPATIENT)
Dept: OTHER | Facility: OTHER | Age: 77
End: 2017-10-26

## 2017-10-26 NOTE — PROGRESS NOTES
Assessment  Assessed    1  Atherosclerosis of arteries of extremities (440 20) (I70 209)   2  Acute on chronic diastolic congestive heart failure (428 33,428 0) (I50 33)   3  CKD (chronic kidney disease), stage III (585 3) (N18 3)    Plan  Atherosclerosis of arteries of extremities    · (1) LIPID PANEL FASTING W DIRECT LDL REFLEX; Status:Active; Requested  XMM:[id number]; Perform:[facility]; [id number]; For:Atherosclerosis of arteries of extremities; Ordered By:Damon Yoon;   · (1) LIPOPROTEIN (a); Status:Active; Requested [id number]; Perform:[facility]; [id number]; For:Atherosclerosis of arteries of extremities; Ordered By:Damon Yoon;  Elevated triglycerides with high cholesterol, Leg swelling    · Omega-3-acid Ethyl Esters 1 GM Oral Capsule; take 2 capsules by mouth twice  a day   Rx By: Aracelis Yates; Dispense: 90 Days ; #:360 Capsule; Refill: 3;For: Elevated triglycerides with high cholesterol, Leg swelling; SANDRA = N; Record  Hypertension    · From  Bumetanide 2 MG Oral Tablet take 1 tablet every other day To  Bumetanide 2 MG Oral Tablet TAKE 1/2 TABLET EVERY DAY   Rx By: Aracelis Yates; Dispense: 90 Days ; #:45 X 100 Tablet Bottle; Refill: 1;For: Hypertension; SANDRA = N; Record; Msg to Pharmacy: take 1/2 tablet daily    Discussion/Summary  Cardiology Discussion Summary Free Text Note Form [facility]:   Acute on chronic diastolic heart failure with a component of lymphedemaWeight lossCompression socksContinue Bumex 1mg daily + propranololLow-salt dietElevation of legs + walkingIf with continued swelling consideration for venous compression boots  on chronic kidney disease-improvingcontinue amlodipine + olmesartanBumex 1mg daily  triglyceridesrecheck lipids in six [id number] mg omega-3 daily  6 months  Chief Complaint  Chief Complaint Free Text Note Form: Danisha Hopkins is here for a 3 month follow up  She states that she is SOB with little exertion   She complains of dizziness when she lays down and sits up  SHe states she does have swelling of her legs and feet  History of Present Illness  Cardiology HPI Free Text Note Form [facility]: Initial visit: 50-year-old woman with a history of malignant hypertension, chronic kidney disease, chronic venous stasis, suspected diastolic heart failure presents for initial visit  Her main complaint is significant lower extremity edema and dyspnea on exertion  Currently she is on 3 diuretic medications  She denies having any dizziness or lightheadedness  Her medications were currently altered due to worsening in her kidney function  She reports no exertional chest tightness  She denies having any PND or orthopnea  But with light activities she does feel shortness of breath  She has had trouble with weight loss  She denies having any syncopal episodes  She denies any recent fevers or chills  She denies having any bleeding  12, 2017: Her lower extremity edema has been controlled  Her blood pressure has been controlled  Her kidney functions improved  She has been taking Omega 3 supplementation  She denies having any syncopal episodes  Denies falls  Review of Systems  Cardiology Female ROS:     Cardiac: AM fatigue-- and-- witnessed apnea episodes, but-- no palpitations present  Skin: no rashes seen   Genitourinary: frequent urination at night-- and-- kidney problems   Psychological: depression-- and-- anxiety  General: trouble sleeping, but-- no lack of energy/fatigue  Respiratory: shortness of breath-- and-- wheezing  HEENT: nose problems-- and-- snoring   Gastrointestinal: heartburn-- and-- diarrhea   Hematologic: No complaints of bleeding disorders, anemia, blood clots, or excessive brusing  Neurological: numbnes,-- tingling,-- dizziness-- and-- daytime sleepiness   Musculoskeletal: arthritis,-- back pain-- and-- swelling/pain      Active Problems  Problems    1  Abdominal pain (789 00) (R10 9)   2   Acute bronchitis (466 0) (J20 9)   3  Acute on chronic diastolic congestive heart failure (428 33,428 0) (I50 33)   4  Ankle pain, unspecified laterality   5  Arthropathy of knee (716 96) (M17 10)   6  Atherosclerosis of arteries of extremities (440 20) (I70 209)   7  Benign essential hypertension (401 1) (I10)   8  Breast pain (611 71) (N64 4)   9  Bunion, unspecified laterality   10  Callus (700) (L84)   11  Cellulitis (682 9) (L03 90)   12  Chronic reflux esophagitis (530 11) (K21 0)   13  Chronic venous insufficiency (459 81) (I87 2)   14  CKD (chronic kidney disease), stage III (585 3) (N18 3)   15  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)   16  Dehydration (276 51) (E86 0)   17  Dermatitis (692 9) (L30 9)   18  Difficulty in walking (719 7) (R26 2)   19  Discoloration of nail (703 8) (L60 8)   20  Diverticulitis of colon (562 11) (K57 32)   21  Dizziness (780 4) (R42)   22  Dyspnea on exertion (786 09) (R06 09)   23  Dystrophic nail (703 8) (L60 3)   24  Dysuria (788 1) (R30 0)   25  Edema (782 3) (R60 9)   26  Electrolyte or fluid disorder (276 9) (E87 8)   27  Elevated triglycerides with high cholesterol (272 2) (E78 2)   28  Encounter for screening for cardiovascular disorders (V81 2) (Z13 6)   29  Encounter for screening mammogram for breast cancer (V76 12) (Z12 31)   30  Esophageal reflux (530 81) (K21 9)   31  Flu vaccine need (V04 81) (Z23)   32  Flu vaccine need (V04 81) (Z23)   33  Gout (274 9) (M10 9)   34  Headache (784 0) (R51)   35  Hiatal hernia (553 3) (K44 9)   36  Hypertension (401 9) (I10)   37  Hyperuricemia (790 6) (E79 0)   38  Hypothyroidism (244 9) (E03 9)   39  Infectious diarrhea (009 2) (A09)   40  Knee pain (719 46) (M25 569)   41  Leg swelling (729 81) (M79 89)   42  Lumbar canal stenosis (724 02) (M48 06)   43  Lumbar radiculopathy (724 4) (M54 16)   44  Lymphedema (457 1) (I89 0)   45  Medicare annual wellness visit, initial (V70 0) (Z00 00)   46   Morbid obesity with body mass index of 40 0-44 9 in adult (278 01,V85 41)    (E66 01,Z68 41)   47  Nails Onychauxis (703 8)   48  Nausea (787 02) (R11 0)   49  Need for prophylactic vaccination and inoculation against influenza (V04 81) (Z23)   50  Nephrolithiasis (592 0) (N20 0)   51  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)   52  Obstructive sleep apnea (327 23) (G47 33)   53  Onychomycosis (110 1) (B35 1)   54  Orthopnea (786 02) (R06 01)   55  Osteoarthritis, localized, knee (715 36) (M17 10)   56  Other specified anxiety disorders (300 09) (F41 8)   57  Other specified inflammatory spondylopathies, 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) (M46 80)   58  Overweight (278 02) (E66 3)   59  Pain in both feet (729 5) (M79 671,M79 672)   60  Pain in wrist joint (719 43) (M25 539)   61  Palpitations (785 1) (R00 2)   62  Pes planus, unspecified laterality (734) (M21 40)   63  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)   64  Pseudogout of left wrist (114 23,478 71) (M11 232)   65  Renal disorder (593 9) (N28 9)   66  Rupture of popliteal cyst (727 51) (M66 0)   67  Screening for colon cancer (V76 51) (Z12 11)   68  Screening for diabetes mellitus (DM) (V77 1) (Z13 1)   69  Screening for genitourinary condition (V81 6) (Z13 89)   70  Screening for malignant neoplasm of breast (V76 10) (Z12 31)   71  Short unilateral neuralgiform headache, conjunctival injection/tearing (339 05) (G44 059)   72  Somnolence, daytime (780 54) (R40 0)   73  Tarsal tunnel syndrome (355 5) (G57 50)   74  Tenderness in limb (729 5) (M79 609)   75  Tinea pedis (110 4) (B35 3)   76  Umbilical hernia (484 5) (K42 9)   77  Visit for screening mammogram (V76 12) (Z12 31)    Past Medical History  Problems    1  Benign essential hypertension (401 1) (I10)   2  Depression screen (V79 0) (Z13 89)   3  Hiatal hernia (553 3) (K44 9)   4  History of abdominal pain (V13 89) (Z87 898)   5  History of abdominal pain (V13 89) (Z87 898)   6  History of arthritis (V13 4) (Z87 39)   7   History of backache (V13 59) (Z87 39)   8  History of cataract (V12 49) (Z86 69)   9  History of eczema (V13 3) (Z87 2)   10  History of heart failure (V12 59) (Z86 79)   11  History of hepatitis (V12 09) (Z86 19)   12  History of onychomycosis (V12 09) (Z86 19)   13  History of sleep apnea (V13 89) (Z86 69)   14  History of Orthopnea (786 02) (R06 01)  Active Problems And Past Medical History Reviewed: The active problems and past medical history were reviewed and updated today  Surgical History  Problems    1  History of Complete Colonoscopy   2  History of Hysterectomy   3  History of Incisional Hernia Repair - Incarcerated   4  History of Knee Surgery  Surgical History Reviewed: The surgical history was reviewed and updated today  Family History  Mother    1  Family history of Coronary artery disease   2  Family history of arthritis (V17 7) (Z82 61)   3  Family history of diabetes mellitus (V18 0) (Z83 3)   4  Family history of hypertension (V17 49) (Z82 49)  Father    5  Family history of hypertension (V17 49) (Z82 49)  Family History    6  Family history of arthritis (V17 7) (Z82 61)  Family History Reviewed: The family history was reviewed and updated today  Social History  Problems    · Denied: History of Alcohol Use (History)   · Daily Coffee Consumption (___ Cups/Day)   · Lack of exercise (V69 0) (Z72 3)   ·    · Never a smoker   · Never a smoker   · Sleeps 6 -7 hours a day  Social History Reviewed: The social history was reviewed and updated today  Current Meds   1  Amlodipine-Olmesartan 5-20 MG Oral Tablet; TAKE 1 TABLET ONCE DAILY; Therapy: [id number] to (Evaluate:24Nov2017)  Requested for: 50Lkr7619; Last   Rx:[id number] Ordered   2  Aspirin 81 MG Oral Tablet Chewable; Therapy: (Recorded:06Xjl1023) to Recorded   3  Bumetanide 2 MG Oral Tablet; take 1 tablet every other day; Therapy: [id number] to (Evaluate:23Jan2018)  Requested for: 78Vxw6283; Last   Rx:[id number] Ordered   4  Cardiovascular   Palpation of heart: Abnormal  -- PMI displaced  Auscultation of heart: Abnormal  -- Regular rate and rhythm positive S1 positive S2 positive S4  Carotid pulses: Normal, 2+ bilaterally  Peripheral vascular exam: Normal pulses throughout, no tenderness, erythema or swelling  Pedal pulses: Normal, 2+ bilaterally  Examination of extremities for edema and/or varicosities: Abnormal  -- Trace edema, venous stasis changes  Abdomen   Abdomen: Abnormal     Liver and spleen: No hepatomegaly or splenomegaly  Musculoskeletal Gait and station: Abnormal -- Digits and nails: Normal without clubbing or cyanosis  -- Inspection/palpation of joints, bones, and muscles: Abnormal    Skin - Skin and subcutaneous tissue: Normal without rashes or lesions  Skin is warm and well perfused, normal turgor  Neurologic - Cranial nerves: II - XII intact  -- Speech: Normal     Psychiatric - Orientation to person, place, and time: Normal -- Mood and affect: Normal       Results/Data  Diagnostic Studies Reviewed Cardio: I personally reviewed the recording/images in the office today  My interpretation follows  ECG Report: Sinus bradycardia no acute ST-T wave changes      Future Appointments    Date/Time Provider Specialty Site   10/13/2017 11:00 AM Adelina Lopez MD [id number] [place]   10/26/2017 11:45 AM Harshal Nuñez DPM Podiatry [facility]   11/06/2017 11:30 AM ERIK Arreguin   Nephrology  [address] [facility]     Signatures   Electronically signed by : ERIK Baum ; Sep 12 2017  5:26PM EST                       (Author)

## 2017-10-27 NOTE — PROGRESS NOTES
Assessment  1  Atherosclerosis of arteries of extremities (440 20) (I70 209)   2  Callus (700) (L84)   3  Difficulty in walking (719 7) (R26 2)    Plan   · *VB - Foot Exam; Status:Complete;   Done: 64XFG1764 12:02PM   · Follow-up visit in 9 weeks Evaluation and Treatment  Follow-up  Status: Hold For -  Scheduling  Requested for: 69XUG6347   · Diabetes Foot Exam Performed; Status:Complete;   Done: 74QOY0554 12:02PM   · Inspect your feet daily ; Status:Complete;   Done: 95DNO7660 12:02PM   · It is important to take good care of your feet if you have diabetes ; Status:Complete;    Done: 31FGM2233 12:02PM    Discussion/Summary  The patient was counseled regarding instructions for management,-- patient and family education,-- importance of compliance with treatment  Patient is able to Self-Care  The treatment plan was reviewed with the patient/guardian  The patient/guardian understands and agrees with the treatment plan      Chief Complaint  feet care      History of Present Illness  HPI: Patient complains of pain in her feet with ambulation  She is pain around the big toe joints  There is no history of trauma  Today the patient is concerned with her swollen legs  She does not have pain in her legs, however, they are itchy and red  She suffers from edema  She does take water pill  Patient is now been diagnosed with renal compromise  Review of Systems    Constitutional: No fever, no chills, feels well, no tiredness, no recent weight gain or loss  Eyes: No complaints of eyesight problems, no red eyes  ENT: no loss of hearing, no nosebleeds, no sore throat  Cardiovascular: No complaints of chest pain, no palpitations, no leg claudication or lower extremity edema  Respiratory: no compliants of shortness of breath, no wheezing, no cough  Gastrointestinal: no complaints of abdominal pain, no constipation, no nausea or diarrhea, no vomiting, no bloody stools     Genitourinary: no complaints of dysuria, no incontinence  Musculoskeletal: arthralgias,-- limb pain-- and-- myalgias, but-- as noted in HPI  Integumentary: no complaints of skin rash or lesion, no itching or dry skin, no skin wounds  Neurological: numbness-- and-- tingling, but-- no complaints of headache, no confusion, no numbness or tingling, no dizziness-- and-- as noted in HPI  Endocrine: No complaints of muscle weakness, no feelings of weakness, no frequent urination, no excessive thirst-- and-- as noted in HPI  feelings of weakness   Psychiatric: No suicidal thoughts, no anxiety, no feelings of depression  Active Problems  1  Abdominal pain (789 00) (R10 9)   2  Acute bronchitis (466 0) (J20 9)   3  Acute on chronic diastolic congestive heart failure (428 33,428 0) (I50 33)   4  Ankle pain, unspecified laterality   5  Arthropathy of knee (716 96) (M17 10)   6  Atherosclerosis of arteries of extremities (440 20) (I70 209)   7  Benign essential hypertension (401 1) (I10)   8  Breast pain (611 71) (N64 4)   9  Bunion, unspecified laterality   10  Callus (700) (L84)   11  Cellulitis (682 9) (L03 90)   12  Chronic low back pain (724 2,338 29) (M54 5,G89 29)   13  Chronic reflux esophagitis (530 11) (K21 0)   14  Chronic venous insufficiency (459 81) (I87 2)   15  CKD (chronic kidney disease), stage III (585 3) (N18 3)   16  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)   17  Dehydration (276 51) (E86 0)   18  Dermatitis (692 9) (L30 9)   19  Difficulty in walking (719 7) (R26 2)   20  Discoloration of nail (703 8) (L60 8)   21  Diverticulitis of colon (562 11) (K57 32)   22  Dizziness (780 4) (R42)   23  Dyspnea on exertion (786 09) (R06 09)   24  Dystrophic nail (703 8) (L60 3)   25  Dysuria (788 1) (R30 0)   26  Edema (782 3) (R60 9)   27  Electrolyte or fluid disorder (276 9) (E87 8)   28  Elevated triglycerides with high cholesterol (272 2) (E78 2)   29   Encounter for screening for cardiovascular disorders (V81 2) (Z13 6)   30  Encounter for screening mammogram for breast cancer (V76 12) (Z12 31)   31  Esophageal reflux (530 81) (K21 9)   32  Flu vaccine need (V04 81) (Z23)   33  Flu vaccine need (V04 81) (Z23)   34  Gout (274 9) (M10 9)   35  Headache (784 0) (R51)   36  Hiatal hernia (553 3) (K44 9)   37  Hypertension (401 9) (I10)   38  Hyperuricemia (790 6) (E79 0)   39  Hypothyroidism (244 9) (E03 9)   40  Infectious diarrhea (009 2) (A09)   41  Knee pain (719 46) (M25 569)   42  Leg swelling (729 81) (M79 89)   43  Lumbar canal stenosis (724 02) (M48 061)   44  Lumbar radiculopathy (724 4) (M54 16)   45  Lymphedema (457 1) (I89 0)   46  Medicare annual wellness visit, initial (V70 0) (Z00 00)   47  Morbid obesity with body mass index of 40 0-44 9 in adult (278 01,V85 41)    (E66 01,Z68 41)   48  Nails Onychauxis (703 8)   49  Nausea (787 02) (R11 0)   50  Need for prophylactic vaccination and inoculation against influenza (V04 81) (Z23)   51  Nephrolithiasis (592 0) (N20 0)   52  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)   53  Obstructive sleep apnea (327 23) (G47 33)   54  Onychomycosis (110 1) (B35 1)   55  Orthopnea (786 02) (R06 01)   56  Osteoarthritis, localized, knee (715 36) (M17 10)   57  Other specified anxiety disorders (300 09) (F41 8)   58  Other specified inflammatory spondylopathies, 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) (M46 80)   59  Overweight (278 02) (E66 3)   60  Pain in both feet (729 5) (M79 671,M79 672)   61  Pain in wrist joint (719 43) (M25 539)   62  Palpitations (785 1) (R00 2)   63  Pes planus, unspecified laterality (734) (M21 40)   64  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)   65  Pseudogout of left wrist (409 88,199 57) (M11 232)   66  Renal disorder (593 9) (N28 9)   67  Rupture of popliteal cyst (727 51) (M66 0)   68  Screening for colon cancer (V76 51) (Z12 11)   69  Screening for diabetes mellitus (DM) (V77 1) (Z13 1)   70  Screening for genitourinary condition (V81 6) (Z13 89)   71   Screening for malignant neoplasm of breast (V76 10) (Z12 31)   72  Short unilateral neuralgiform headache, conjunctival injection/tearing (339 05) (G44 059)   73  Somnolence, daytime (780 54) (R40 0)   74  Spinal stenosis of lumbar region (724 02) (M48 061)   75  Tarsal tunnel syndrome (355 5) (G57 50)   76  Tenderness in limb (729 5) (M79 609)   77  Tinea pedis (110 4) (B35 3)   78  Umbilical hernia (076 1) (K42 9)   79  Visit for screening mammogram (V76 12) (Z12 31)    Past Medical History   · Benign essential hypertension (401 1) (I10)   · Depression screen (V79 0) (Z13 89)   · Hiatal hernia (553 3) (K44 9)   · History of abdominal pain (V13 89) (W13 249)   · History of abdominal pain (V13 89) (H06 638)   · History of arthritis (V13 4) (Z87 39)   · History of backache (V13 59) (Z87 39)   · History of cataract (V12 49) (Z86 69)   · History of eczema (V13 3) (Z87 2)   · History of heart failure (V12 59) (Z86 79)   · History of hepatitis (V12 09) (Z86 19)   · History of onychomycosis (V12 09) (Z86 19)   · History of sleep apnea (V13 89) (Z86 69)   · History of Orthopnea (786 02) (R06 01)    The active problems and past medical history were reviewed and updated today  Surgical History   · History of Complete Colonoscopy   · History of Hysterectomy   · History of Incisional Hernia Repair - Incarcerated   · History of Knee Surgery    The surgical history was reviewed and updated today         Family History  Mother    · Family history of Coronary artery disease   · Family history of arthritis (V17 7) (Z82 61)   · Denied: Family history of depression   · Family history of diabetes mellitus (V18 0) (Z83 3)   · Family history of hypertension (V17 49) (Z82 49)   · Denied: Family history of substance abuse  Father    · Denied: Family history of depression   · Family history of hypertension (V17 49) (Z82 49)   · Denied: Family history of substance abuse  Sibling    · Denied: Family history of depression   · Denied: Family history of substance abuse  Family History    · Family history of arthritis (V17 7) (Z82 61)    The family history was reviewed and updated today  Social History   · Denied: History of Alcohol Use (History)   · Daily Coffee Consumption (___ Cups/Day)   · Lack of exercise (V69 0) (Z72 3)   ·    · Never a smoker   · Never a smoker   · Sleeps 6 -7 hours a day  The social history was reviewed and updated today  The social history was reviewed and is unchanged  Current Meds   1  Amlodipine-Olmesartan 5-20 MG Oral Tablet; TAKE 1 TABLET ONCE DAILY; Therapy: 47ZQQ3809 to (Evaluate:24Nov2017)  Requested for: 27Sia0571; Last   Rx:39Zsm1845 Ordered   2  Aspirin 81 MG Oral Tablet Chewable; Therapy: (Recorded:57Ukl3545) to Recorded   3  Bumetanide 2 MG Oral Tablet; TAKE 1/2 TABLET EVERY DAY; Therapy: 21UGH7274 to (Evaluate:11Mar2018)  Requested for: 98Rhh3827; Last   Rx:83Ujj5617 Ordered   4  Gabapentin 300 MG Oral Capsule; TAKE 1 CAPSULE AT BEDTIME; Therapy: 41AVV0643 to (Evaluate:13Sep2017)  Requested for: 09KKN2912; Last   Rx:15Jun2017 Ordered   5  Levothyroxine Sodium 125 MCG Oral Tablet; TAKE 1 TABLET ONE TIME DAILY; Therapy: 74BOC7433 to ([place])  Requested for: 12Jun2017; Last   Rx:12Jun2017 Ordered   6  Omega-3-acid Ethyl Esters 1 GM Oral Capsule; take 2 capsules by mouth twice a day; Therapy: 19XXW2923 to (Evaluate:29Ciw4781)  Requested for: 16Egc6331; Last   Rx:32Mez7886 Ordered   7  Omeprazole 20 MG Oral Capsule Delayed Release; TAKE 1 CAPSULE EVERY DAY AS   DIRECTED  BY  PHYSICIAN; Therapy: 78RCL9562 to (Jairo Jacob)  Requested for: 06NAB0350; Last   Rx:12Rdh6604 Ordered   8  Propranolol HCl - 80 MG Oral Tablet; TAKE 1/2 TABLET EVERY 12 HOURS DAILY; Therapy: 17Mwd7288 to (Last Rx:14Ygf3310)  Requested for: 79Ffx2766 Ordered   9  Spironolactone 25 MG Oral Tablet; TAKE 1 TABLET DAILY; Therapy: 91QLQ3888 to Recorded   10  Vitamin D3 TABS; Take 1 tablet daily;     Therapy: (Recorded:02Jun2017) to Recorded    The medication list was reviewed and updated today  Allergies  1  No Known Drug Allergies    Vitals   Recorded: 92JGG3711 18:26QC   Systolic 432   Diastolic 66   Height 5 ft    Weight 214 lb    BMI Calculated 41 79   BSA Calculated 1 92     Physical Exam  Left Foot: Appearance: Normal except as noted: excessive pronation-- and-- pes planus  Right Foot: Appearance: Normal except as noted: excessive pronation-- and-- pes planus  Tenderness: None except the calcaneous,-- medial calcaneous-- and-- insertion of the plantar fascia  Left Ankle: Appearance: Normal except ecchymosis-- and-- swelling laterally  ROM: limited ROM in all planes   Right Ankle: ROM: limited ROM in all planes Motor: diffuse weakness  Neurological Exam: performed  Light touch was intact bilaterally  Vibratory sensation was intact bilaterally  Response to monofilament test was intact bilaterally  Deep tendon reflexes: patellar reflex present bilaterally-- and-- achilles reflex present bilaterally  Vascular Exam: performed Dorsalis pedis pulses were 1/4 bilaterally  Posterior tibial pulses were 1/4 bilaterally  Elevation Pallor: diminished bilaterally  Capillary refill time was greater than 3 seconds bilaterally-- and-- Q9 findings bilateral  Negative digital hair noted  Positive abnormal cooling bilateral  Edema: moderate bilaterally-- and-- 6/7 pitting edema  Negative Homans sign  Toenails: All of the toenails were elongated,-- hypertrophied,-- discolored-- and-- Mycotic with onychogryphosis  Note is made of bilateral tinea pedis in moccasin foot  Distribution  Socks and shoes removed, Right Foot Findings: swollen, erythematous and dry  The sensory exam showed diminished vibratory sensation at the level of the toes  Diminished tactile sensation with monofilament testing throughout the right foot  Socks and shoes removed, Left Foot Findings: swollen, erythematous and dry     The sensory exam showed diminished vibratory sensation at the level of the toes  Diminished tactile sensation with monofilament testing throughout the left foot  Capillary refills findings on the right were delayed in the toes  Pulses:   1+ in the posterior tibialis on the right   1+ in the dorsalis pedis on the right  Capillary refills findings on the left were delayed in the toes  Pulses:   1+ in the posterior tibialis on the left   1+ in the dorsalis pedis on the left  Assign Risk Category: 2: Loss of protective sensation with or without weakness, deformity, callus, pre-ulcer, or history of ulceration  High risk  Hyperkeratosis: present on both first toes,-- present on both first sub metatarsals-- and-- Bilateral plantar moccasin tinea pedis noted  Shoe Gear Evaluation: performed ()  Recommendation(s): SAS style  Procedure  All mycotic nails debrided today  Bilateral pre-ulcerative lesions debrided today  Procedures performed without pain or complication      Attending Note  Attending Note [facility]: Key Parts of the Exam: Patient has continued leg pain  This is due to radiculopathy  P       Future Appointments    Date/Time Provider Specialty Site   11/15/2017 01:00 PM Marry Cotton MD Family Medicine FAMILY PRACTICE [facility]   11/06/2017 01:30 PM REIK Bingham   Nephrology  [address] [facility]   12/08/2017 10:15 AM Layne Connor MD Pain Management [place] [address]   Electronically signed by : Jacinta Crow DPM; Oct 26 2017 12:03PM EST                       (Author)

## 2017-11-02 ENCOUNTER — TRANSCRIBE ORDERS (OUTPATIENT)
Dept: LAB | Facility: CLINIC | Age: 77
End: 2017-11-02

## 2017-11-02 ENCOUNTER — APPOINTMENT (OUTPATIENT)
Dept: LAB | Facility: CLINIC | Age: 77
End: 2017-11-02
Payer: MEDICARE

## 2017-11-02 DIAGNOSIS — N18.30 CHRONIC KIDNEY DISEASE, STAGE III (MODERATE) (HCC): ICD-10-CM

## 2017-11-02 DIAGNOSIS — E79.0 HYPERURICEMIA WITHOUT SIGNS OF INFLAMMATORY ARTHRITIS AND TOPHACEOUS DISEASE: ICD-10-CM

## 2017-11-02 DIAGNOSIS — E03.9 HYPOTHYROIDISM: ICD-10-CM

## 2017-11-02 DIAGNOSIS — I50.33 ACUTE ON CHRONIC DIASTOLIC CONGESTIVE HEART FAILURE (HCC): ICD-10-CM

## 2017-11-02 DIAGNOSIS — M10.9 PODAGRA: ICD-10-CM

## 2017-11-02 DIAGNOSIS — M10.9 GOUT: ICD-10-CM

## 2017-11-02 DIAGNOSIS — I50.33 ACUTE ON CHRONIC DIASTOLIC HEART FAILURE (HCC): Primary | ICD-10-CM

## 2017-11-02 DIAGNOSIS — I10 ESSENTIAL HYPERTENSION, MALIGNANT: ICD-10-CM

## 2017-11-02 DIAGNOSIS — I10 ESSENTIAL (PRIMARY) HYPERTENSION: ICD-10-CM

## 2017-11-02 LAB
25(OH)D3 SERPL-MCNC: 40.5 NG/ML (ref 30–100)
ALBUMIN SERPL BCP-MCNC: 3.4 G/DL (ref 3.5–5)
ALP SERPL-CCNC: 94 U/L (ref 46–116)
ALT SERPL W P-5'-P-CCNC: 17 U/L (ref 12–78)
ANION GAP SERPL CALCULATED.3IONS-SCNC: 8 MMOL/L (ref 4–13)
AST SERPL W P-5'-P-CCNC: 14 U/L (ref 5–45)
BASOPHILS # BLD AUTO: 0.02 THOUSANDS/ΜL (ref 0–0.1)
BASOPHILS NFR BLD AUTO: 0 % (ref 0–1)
BILIRUB SERPL-MCNC: 0.41 MG/DL (ref 0.2–1)
BUN SERPL-MCNC: 34 MG/DL (ref 5–25)
CALCIUM SERPL-MCNC: 9 MG/DL (ref 8.3–10.1)
CHLORIDE SERPL-SCNC: 106 MMOL/L (ref 100–108)
CHOLEST SERPL-MCNC: 180 MG/DL (ref 50–200)
CO2 SERPL-SCNC: 28 MMOL/L (ref 21–32)
CREAT SERPL-MCNC: 1.32 MG/DL (ref 0.6–1.3)
CREAT UR-MCNC: 108 MG/DL
EOSINOPHIL # BLD AUTO: 0.15 THOUSAND/ΜL (ref 0–0.61)
EOSINOPHIL NFR BLD AUTO: 2 % (ref 0–6)
ERYTHROCYTE [DISTWIDTH] IN BLOOD BY AUTOMATED COUNT: 14.4 % (ref 11.6–15.1)
EST. AVERAGE GLUCOSE BLD GHB EST-MCNC: 114 MG/DL
GFR SERPL CREATININE-BSD FRML MDRD: 39 ML/MIN/1.73SQ M
GLUCOSE P FAST SERPL-MCNC: 98 MG/DL (ref 65–99)
HBA1C MFR BLD: 5.6 % (ref 4.2–6.3)
HCT VFR BLD AUTO: 35.1 % (ref 34.8–46.1)
HDLC SERPL-MCNC: 50 MG/DL (ref 40–60)
HGB BLD-MCNC: 11.5 G/DL (ref 11.5–15.4)
LDLC SERPL CALC-MCNC: 94 MG/DL (ref 0–100)
LYMPHOCYTES # BLD AUTO: 1.13 THOUSANDS/ΜL (ref 0.6–4.47)
LYMPHOCYTES NFR BLD AUTO: 18 % (ref 14–44)
MCH RBC QN AUTO: 28.3 PG (ref 26.8–34.3)
MCHC RBC AUTO-ENTMCNC: 32.8 G/DL (ref 31.4–37.4)
MCV RBC AUTO: 86 FL (ref 82–98)
MONOCYTES # BLD AUTO: 0.64 THOUSAND/ΜL (ref 0.17–1.22)
MONOCYTES NFR BLD AUTO: 10 % (ref 4–12)
NEUTROPHILS # BLD AUTO: 4.29 THOUSANDS/ΜL (ref 1.85–7.62)
NEUTS SEG NFR BLD AUTO: 70 % (ref 43–75)
NRBC BLD AUTO-RTO: 0 /100 WBCS
PLATELET # BLD AUTO: 177 THOUSANDS/UL (ref 149–390)
PMV BLD AUTO: 11.5 FL (ref 8.9–12.7)
POTASSIUM SERPL-SCNC: 4.2 MMOL/L (ref 3.5–5.3)
PROT SERPL-MCNC: 7.1 G/DL (ref 6.4–8.2)
PROT UR-MCNC: 15 MG/DL
PROT/CREAT UR: 0.14 MG/G{CREAT} (ref 0–0.1)
PTH-INTACT SERPL-MCNC: 103.9 PG/ML (ref 14–72)
RBC # BLD AUTO: 4.07 MILLION/UL (ref 3.81–5.12)
SODIUM SERPL-SCNC: 142 MMOL/L (ref 136–145)
TRIGL SERPL-MCNC: 182 MG/DL
TSH SERPL DL<=0.05 MIU/L-ACNC: 0.51 UIU/ML (ref 0.36–3.74)
URATE SERPL-MCNC: 11.2 MG/DL (ref 2–6.8)
WBC # BLD AUTO: 6.25 THOUSAND/UL (ref 4.31–10.16)

## 2017-11-02 PROCEDURE — 84550 ASSAY OF BLOOD/URIC ACID: CPT

## 2017-11-02 PROCEDURE — 85025 COMPLETE CBC W/AUTO DIFF WBC: CPT

## 2017-11-02 PROCEDURE — 83036 HEMOGLOBIN GLYCOSYLATED A1C: CPT

## 2017-11-02 PROCEDURE — 84156 ASSAY OF PROTEIN URINE: CPT

## 2017-11-02 PROCEDURE — 80061 LIPID PANEL: CPT

## 2017-11-02 PROCEDURE — 83970 ASSAY OF PARATHORMONE: CPT

## 2017-11-02 PROCEDURE — 36415 COLL VENOUS BLD VENIPUNCTURE: CPT

## 2017-11-02 PROCEDURE — 82570 ASSAY OF URINE CREATININE: CPT

## 2017-11-02 PROCEDURE — 82306 VITAMIN D 25 HYDROXY: CPT

## 2017-11-02 PROCEDURE — 80053 COMPREHEN METABOLIC PANEL: CPT

## 2017-11-02 PROCEDURE — 84443 ASSAY THYROID STIM HORMONE: CPT

## 2017-11-03 ENCOUNTER — GENERIC CONVERSION - ENCOUNTER (OUTPATIENT)
Dept: OTHER | Facility: OTHER | Age: 77
End: 2017-11-03

## 2017-11-06 ENCOUNTER — ALLSCRIPTS OFFICE VISIT (OUTPATIENT)
Dept: OTHER | Facility: OTHER | Age: 77
End: 2017-11-06

## 2017-11-15 ENCOUNTER — ALLSCRIPTS OFFICE VISIT (OUTPATIENT)
Dept: OTHER | Facility: OTHER | Age: 77
End: 2017-11-15

## 2017-12-08 ENCOUNTER — ALLSCRIPTS OFFICE VISIT (OUTPATIENT)
Dept: OTHER | Facility: OTHER | Age: 77
End: 2017-12-08

## 2017-12-09 NOTE — CONSULTS
Assessment  Assessed    1  Lumbar disc herniation with radiculopathy (722 10) (M51 16)   2  Lumbar stenosis with neurogenic claudication (724 03) (M91 390)    Discussion/Summary    The patient's symptoms, history/physical are consistent with pain is result of her disc herniation L4-5 leading to spinal stenosis  At this time, I discussed performing a right L4-5 transforaminal epidural steroid injection to help reduce swelling and inflammation which is leading to her pain symptoms  She was apprised of the most common risks and would like to proceed  She will be scheduled for an upcoming Tuesday or Thursday under fluoroscopic guidance  addition, I advised her that it would be safe for her to take the gabapentin 300 mg at bedtime again as this is not nephrotoxic  The patient has the current Goals: Improved pain control  The patent has the current Barriers: None  Patient is able to Self-Care  Educational resources provided: Brochure on the epidural steroid injection  Chief Complaint  Chief Complaints    1  Back Pain    History of Present Illness  The patient is a pleasant 71-year-old female who presents for consultation in regards to lower back and right greater than left leg pain  Symptoms have been present for 6 years without any precipitating injury or trauma  Symptoms are severe rated 10/10 on a numeric rating scale and felt nearly constantly  Symptoms are sharp, pressure-like in the lower back and radiating into the right greater than left leg with numbness and tingling  She feels weakness of the right leg  Symptoms are aggravated standing, bending, walking  history has included physical therapy which provided no relief many years ago  She takes Tylenol 500 mg which provided some mild relief  She was taking gabapentin but was told to stop it due to recent acute kidney injury following a diarrhea episode    Referring physician is  Dr Devi Concepcion  Primary Care physician is  Maida obando with complaints of gradual onset of constant episodes of severe bilateral lower back pain, described as sharp and tingling, radiating to the right buttock and bilateral lower leg  On a scale of 1 to 10, the patient rates the pain as 10  Symptoms are improved by acetaminophen  Symptoms are made worse by standing, prolonged standing and bending  Symptoms are unchanged  Review of Systems   Constitutional: no fever,-- no recent weight gain-- and-- no recent weight loss  Eyes: no double vision-- and-- no blurry vision  Cardiovascular: no chest pain,-- no palpitations-- and-- no lower extremity edema  Respiratory: shortness of breath, but-- no wheezing  Musculoskeletal: difficulty walking,-- muscle weakness,-- joint stiffness,-- pain in extremity back-- and-- decreased range of motion, but-- no joint swelling-- and-- no limb swelling  Neurological: no dizziness,-- no difficulty swallowing,-- no memory loss,-- no loss of consciousness-- and-- no seizures  Gastrointestinal: diarrhea, but-- no nausea,-- no vomiting-- and-- no constipation  Genitourinary: no difficulty initiating urine stream,-- no genital pain-- and-- no frequent urination  Integumentary: a rash  Psychiatric: depression  Endocrine: no excessive thirst,-- no adrenal disease,-- no hypothyroidism-- and-- no hyperthyroidism  Hematologic/Lymphatic: no tendency for easy bruising-- and-- no tendency for easy bleeding  ROS reviewed  Active Problems  Problems    1  Abdominal pain (789 00) (R10 9)   2  Acute bronchitis (466 0) (J20 9)   3  Acute on chronic diastolic congestive heart failure (428 33,428 0) (I50 33)   4  Ankle pain, unspecified laterality   5  Arthropathy of knee (716 96) (M17 10)   6  Atherosclerosis of arteries of extremities (440 20) (I70 209)   7  Benign essential hypertension (401 1) (I10)   8  Breast pain (611 71) (N64 4)   9  Bunion, unspecified laterality   10  Callus (700) (L84)   11  Cellulitis (682 9) (L03 90)   12  Chronic low back pain (724 2,338 29) (M54 5,G89 29)   13  Chronic reflux esophagitis (530 11) (K21 0)   14  Chronic venous insufficiency (459 81) (I87 2)   15  CKD (chronic kidney disease), stage III (585 3) (N18 3)   16  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)   17  Dehydration (276 51) (E86 0)   18  Dermatitis (692 9) (L30 9)   19  Difficulty in walking (719 7) (R26 2)   20  Discoloration of nail (703 8) (L60 8)   21  Diverticulitis of colon (562 11) (K57 32)   22  Dizziness (780 4) (R42)   23  Dyspnea on exertion (786 09) (R06 09)   24  Dystrophic nail (703 8) (L60 3)   25  Dysuria (788 1) (R30 0)   26  Edema (782 3) (R60 9)   27  Electrolyte or fluid disorder (276 9) (E87 8)   28  Elevated triglycerides with high cholesterol (272 2) (E78 2)   29  Encounter for screening for cardiovascular disorders (V81 2) (Z13 6)   30  Encounter for screening mammogram for breast cancer (V76 12) (Z12 31)   31  Esophageal reflux (530 81) (K21 9)   32  Flu vaccine need (V04 81) (Z23)   33  Flu vaccine need (V04 81) (Z23)   34  Gout (274 9) (M10 9)   35  Headache (784 0) (R51)   36  Hiatal hernia (553 3) (K44 9)   37  Hyperlipemia (272 4) (E78 5)   38  Hypertension (401 9) (I10)   39  Hyperuricemia (790 6) (E79 0)   40  Hypothyroidism (244 9) (E03 9)   41  Infectious diarrhea (009 2) (A09)   42  Knee pain (719 46) (M25 569)   43  Leg swelling (729 81) (M79 89)   44  Lumbar canal stenosis (724 02) (M48 061)   45  Lumbar radiculopathy (724 4) (M54 16)   46  Lymphedema (457 1) (I89 0)   47  Medicare annual wellness visit, initial (V70 0) (Z00 00)   48  Medicare annual wellness visit, subsequent (V70 0) (Z00 00)   49  Morbid obesity with body mass index of 40 0-44 9 in adult (278 01,V85 41)  (E66 01,Z68 41)   50  Nails Onychauxis (703 8)   51  Nausea (787 02) (R11 0)   52  Need for pneumococcal vaccination (V03 82) (Z23)   53   Need for prophylactic vaccination and inoculation against influenza (V04 81) (Z23)   47  Need for shingles vaccine (V04 89) (Z23)   55  Nephrolithiasis (592 0) (N20 0)   56  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)   57  Obstructive sleep apnea (327 23) (G47 33)   58  Onychomycosis (110 1) (B35 1)   59  Orthopnea (786 02) (R06 01)   60  Osteoarthritis, localized, knee (715 36) (M17 10)   61  Osteoporosis screening (V82 81) (Z13 820)   62  Other specified anxiety disorders (300 09) (F41 8)   63  Other specified inflammatory spondylopathies, 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) (M46 80)   64  Overweight (278 02) (E66 3)   65  Pain in both feet (729 5) (M79 671,M79 672)   66  Pain in wrist joint (719 43) (M25 539)   67  Palpitations (785 1) (R00 2)   68  Pes planus, unspecified laterality (734) (M21 40)   69  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)   70  Pseudogout of left wrist (021 75,947 27) (M11 232)   71  Renal disorder (593 9) (N28 9)   72  Rupture of popliteal cyst (727 51) (M66 0)   73  Screening for colon cancer (V76 51) (Z12 11)   74  Screening for diabetes mellitus (DM) (V77 1) (Z13 1)   75  Screening for genitourinary condition (V81 6) (Z13 89)   76  Screening for malignant neoplasm of breast (V76 10) (Z12 31)   77  Short unilateral neuralgiform headache, conjunctival injection/tearing (339 05) (G44 059)   78  Somnolence, daytime (780 54) (R40 0)   79  Spinal stenosis of lumbar region (724 02) (M48 061)   80  Tarsal tunnel syndrome (355 5) (G57 50)   81  Tenderness in limb (729 5) (M79 609)   82  Tinea pedis (110 4) (B35 3)   83  Umbilical hernia (643 8) (K42 9)   84  Visit for screening mammogram (V76 12) (Z12 31)    Past Medical History  Problems    1  Benign essential hypertension (401 1) (I10)   2  Depression screen (V79 0) (Z13 89)   3  Hiatal hernia (553 3) (K44 9)   4  History of abdominal pain (V13 89) (Z87 898)   5  History of abdominal pain (V13 89) (Z87 898)   6  History of arthritis (V13 4) (Z87 39)   7  History of backache (V13 59) (Z87 39)   8   History of cataract (V12 49) (Z86 69)   9  History of eczema (V13 3) (Z87 2)   10  History of heart failure (V12 59) (Z86 79)   11  History of hepatitis (V12 09) (Z86 19)   12  History of onychomycosis (V12 09) (Z86 19)   13  History of sleep apnea (V13 89) (Z86 69)   14  History of Orthopnea (786 02) (R06 01)    The active problems and past medical history were reviewed and updated today  Surgical History  Problems    1  History of Complete Colonoscopy   2  History of Hysterectomy   3  History of Incisional Hernia Repair - Incarcerated   4  History of Knee Surgery    The surgical history was reviewed and updated today  Family History  Mother    1  Family history of Coronary artery disease   2  Family history of arthritis (V17 7) (Z82 61)   3  Denied: Family history of depression   4  Family history of diabetes mellitus (V18 0) (Z83 3)   5  Family history of hypertension (V17 49) (Z82 49)   6  Denied: Family history of substance abuse  Father    7  Denied: Family history of depression   8  Family history of hypertension (V17 49) (Z82 49)   9  Denied: Family history of substance abuse  Sibling    8  Denied: Family history of depression   11  Denied: Family history of substance abuse  Family History    12  Family history of arthritis (V17 7) (Z82 61)    The family history was reviewed and updated today  Social History  Problems    · Denied: History of Alcohol Use (History)   · Daily Coffee Consumption (___ Cups/Day)   · Lack of exercise (V69 0) (Z72 3)   ·    · Never a smoker   · Never a smoker   · Sleeps 6 -7 hours a day  The social history was reviewed and updated today  The social history was reviewed and is unchanged  Current Meds   1  Allopurinol 300 MG Oral Tablet; take one tablet by mouth every day; Therapy: 99FXF4435 to (Evaluate:52Rkk0528)  Requested for: 86YYS0357; Last Rx:73Ayg8485 Ordered   2  Amlodipine-Olmesartan 5-20 MG Oral Tablet; TAKE 1 TABLET ONCE DAILY;  Therapy: 84ZLN6766 to (Evaluate:24Nov2017) Requested for: 78Yof1728; Last Rx:40Ztq4325 Ordered   3  Aspirin 81 MG Oral Tablet Chewable; Therapy: (Recorded:41Ues9466) to Recorded   4  Bumetanide 2 MG Oral Tablet; TAKE 1 TABLET DAILY; Therapy: 02DSK1731 to (Evaluate:19Utf4713)  Requested for: 88SRS4851; Last Rx:97Dht5445 Ordered   5  Colchicine 0 6 MG Oral Tablet; TAKE 1 TABLET DAILY AS DIRECTED; Therapy: 25SMP0844 to (Evaluate:77Jhy1367)  Requested for: 37CLX8795; Last Rx:14Mia2792 Ordered   6  Gabapentin 300 MG Oral Capsule; TAKE 1 CAPSULE AT BEDTIME; Therapy: 96UVT2544 to (Evaluate:57Kie1762)  Requested for: 99VHI7383; Last Rx:11Acf5667 Ordered   7  Levothyroxine Sodium 100 MCG Oral Tablet; 1 TAb PO Daily in AM; Therapy: 15LVY6623 to (Last Rx:19Pfo9616)  Requested for: 03GEN6479 Ordered   8  Omega-3-acid Ethyl Esters 1 GM Oral Capsule; take 2 capsules by mouth twice a day; Therapy: 84CYR1099 to (Evaluate:59Quj5296)  Requested for: 55Dmd5619; Last Rx:80Pjh4713 Ordered   9  Omeprazole 20 MG Oral Capsule Delayed Release; TAKE 1 CAPSULE EVERY DAY AS DIRECTED  BY  PHYSICIAN; Therapy: 56PMO7431 to (Damián Muñoz)  Requested for: 79ULQ3805; Last Rx:61Zbx2159 Ordered   10  Propranolol HCl - 80 MG Oral Tablet; TAKE 1/2 TABLET EVERY 12 HOURS DAILY; Therapy: 15Iix0243 to (Last Rx:42Hfe4755)  Requested for: 62Nkx3150 Ordered   11  Spironolactone 25 MG Oral Tablet; TAKE 1 TABLET DAILY; Therapy: 01KLC4359 to Recorded   12  Vitamin D3 TABS; Take 1 tablet daily; Therapy: (Recorded:02Jun2017) to Recorded   13  Zoster (Zostavax); INJECT 0 5  ML Subcutaneous; Therapy: 86MVH6484 to (Last Rx:00Eiw4547) Ordered    The medication list was reviewed and updated today  Allergies  Medication    1   No Known Drug Allergies    Vitals  Vital Signs    Recorded: 19VHM4438 09:53AM   Temperature 96 9 F   Heart Rate 68   Respiration 16   Systolic 770   Diastolic 72   Height 5 ft    Weight 216 lb    BMI Calculated 42 18   BSA Calculated 1 92   Pain Scale 10       Physical Exam Constitutional  General appearance: Abnormal   morbidly obese  Eyes  Sclera: anicteric  HEENT  Hearing grossly intact  Pulmonary  Respiratory effort: Even and unlabored  Cardiovascular  Examination of extremities: No edema or pitting edema present  Skin  Skin and subcutaneous tissue: Normal without rashes or lesions, well hydrated  Psychiatric  Mood and affect: Mood and affect appropriate  Musculoskeletal  Gait and station: Abnormal  -- antalgic  Lumbar/Sacral Spine examination demonstrates Lumbosacral Spine:  Appearance: Normal   Tenderness: at lumbar spine,-- right paraspinal,-- right sciatic notch-- and-- the right sacroiliac joint  Lumbosacral Spine Sensory: intact to light touch and pinprick in the lower extremities  Flexion was restricted-- and-- was painful  Extension was restricted-- and-- was painful  Left lateral flexion was not restricted-- and-- was painless  Right lateral flexion was restricted-- and-- was painful  Rotation to the left was not restricted-- and-- was painless  Rotation to the right was restricted-- and-- was painful  Foot and ankle strength was normal on the left side  Right Foot Plantar Flexion: 5/5  Right Foot Dorsiflexion: 4/5  Knee strength was normal bilaterally  Hip strength was normal bilaterally  Evaluation of Muscle Stretch Reflexes on the right side demonstrates 2/4 Knee Jerk Reflex-- and-- 2/4 Ankle Jerk Reflex  Evaluation of Muscle Stretch Reflexes on the left side demonstrates 2/4 Knee Jerk Reflex-- and-- 2/4 Ankle Jerk Reflex  Special Tests: positive Straight Leg Raise on right, but-- negative Straight Leg Raise on left,-- negative Yaw's Maneuver on right-- and-- negative Yaw's Maneuver on lef  Results/Data    Results    I personally reviewed the films/images in the office today  MRI:   MRI LUMBAR SPINE WITHOUT CONTRASTI (7/13/2013)INDICATION- Chronic worsening low back pain extending into both legs,left leg numbnessCOMPARISON- X-ray 7/5/2013TECHNIQUE-Sagittal T1, sagittal T2, sagittal inversion recovery, axial T1 andaxial T2, coronal T2  IMAGE QUALITY- DiagnosticFINDINGS-ALIGNMENT- Degenerative grade 1 L4-L5 anterolisthesis  No fracture  Noscoliosis  MARROW SIGNAL- Normal marrow signal is identified within thevisualized bony structures  No discrete marrow lesion  DISTAL CORD AND CONUS- Normal size and signal within the distal cordand conus  The conus ends at the L1 level  PARASPINAL SOFT TISSUES- Paraspinal soft tissues are unremarkable  SACRUM- Normal signal within the sacrum  No evidence of insufficiencyor stress fracture  LOWER THORACIC DISC SPACES- Normal disc height and signal  No discherniation, canal stenosis or foraminal narrowing  LUMBAR DISC SPACES- L1-L2- Marginal osteophyte, minor bulgeL2-L3- Marginal osteophytes, mild facet arthrosis  Minor bulgeL3-L4- Moderate facet arthrosis, minor circumferential bulge, marginalosteophytes  L4-L5- Degenerative grade 1 anterolisthesis, advanced facet arthrosis,and disc extends slightly asymmetrically to the right  Distortion andmild narrowing of both foramen  Right greater than left lateral recessstenosis  Correlate for bilateral L5 radiculitis  Redundancy of theligamentum flava, possible small left synovial cyst, AP dimension of the canal reduced to 6 mm  L5-S1- Advanced facet arthrosis, circumferential bulge, marginalosteophytes without critical stenosis        Future Appointments    Date/Time Provider Specialty Site   01/09/2018 03:45 PM Bennett Wills DPM Podiatry [facility] D[facility]    02/22/2018 11:00 AM Abdias Moreno MD Family [facility] [address]        Signatures   Electronically signed by : Megan Carpio MD; Dec  8 2017 10:47AM EST                       (Author)

## 2017-12-12 ENCOUNTER — ALLSCRIPTS OFFICE VISIT (OUTPATIENT)
Dept: RADIOLOGY | Facility: CLINIC | Age: 77
End: 2017-12-12
Payer: MEDICARE

## 2017-12-19 ENCOUNTER — GENERIC CONVERSION - ENCOUNTER (OUTPATIENT)
Dept: OTHER | Facility: OTHER | Age: 77
End: 2017-12-19

## 2017-12-20 ENCOUNTER — ALLSCRIPTS OFFICE VISIT (OUTPATIENT)
Dept: OTHER | Facility: OTHER | Age: 77
End: 2017-12-20

## 2017-12-21 NOTE — PROGRESS NOTES
Assessment   1  Localized osteoarthritis of knees, bilateral (715 36) (M17 0)    Plan   Localized osteoarthritis of knees, bilateral    · Euflexxa 20 MG/2ML Intra-articular Solution Prefilled Syringe   · Euflexxa 20 MG/2ML Intra-articular Solution Prefilled Syringe    Chief Complaint   1  Knee Pain    History of Present Illness   Bilateral Euflexxa      Active Problems   1  Abdominal pain (789 00) (R10 9)   2  Acute bronchitis (466 0) (J20 9)   3  Acute on chronic diastolic congestive heart failure (428 33,428 0) (I50 33)   4  Ankle pain, unspecified laterality   5  Arthropathy of knee (716 96) (M17 10)   6  Atherosclerosis of arteries of extremities (440 20) (I70 209)   7  Benign essential hypertension (401 1) (I10)   8  Breast pain (611 71) (N64 4)   9  Bunion, unspecified laterality   10  Callus (700) (L84)   11  Cellulitis (682 9) (L03 90)   12  Chronic low back pain (724 2,338 29) (M54 5,G89 29)   13  Chronic reflux esophagitis (530 11) (K21 0)   14  Chronic venous insufficiency (459 81) (I87 2)   15  CKD (chronic kidney disease), stage III (585 3) (N18 3)   16  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)   17  Dehydration (276 51) (E86 0)   18  Dermatitis (692 9) (L30 9)   19  Difficulty in walking (719 7) (R26 2)   20  Discoloration of nail (703 8) (L60 8)   21  Diverticulitis of colon (562 11) (K57 32)   22  Dizziness (780 4) (R42)   23  Dyspnea on exertion (786 09) (R06 09)   24  Dystrophic nail (703 8) (L60 3)   25  Dysuria (788 1) (R30 0)   26  Edema (782 3) (R60 9)   27  Electrolyte or fluid disorder (276 9) (E87 8)   28  Elevated triglycerides with high cholesterol (272 2) (E78 2)   29  Encounter for screening for cardiovascular disorders (V81 2) (Z13 6)   30  Encounter for screening mammogram for breast cancer (V76 12) (Z12 31)   31  Esophageal reflux (530 81) (K21 9)   32  Flu vaccine need (V04 81) (Z23)   33  Flu vaccine need (V04 81) (Z23)   34  Gout (274 9) (M10 9)   35  Headache (784 0) (R51)   36  Hiatal hernia (553 3) (K44 9)   37  Hyperlipemia (272 4) (E78 5)   38  Hypertension (401 9) (I10)   39  Hyperuricemia (790 6) (E79 0)   40  Hypothyroidism (244 9) (E03 9)   41  Infectious diarrhea (009 2) (A09)   42  Knee pain (719 46) (M25 569)   43  Leg swelling (729 81) (M79 89)   44  Localized osteoarthritis of knees, bilateral (715 36) (M17 0)   45  Lumbar canal stenosis (724 02) (M48 061)   46  Lumbar disc herniation with radiculopathy (722 10) (M51 16)   47  Lumbar radiculopathy (724 4) (M54 16)   48  Lumbar stenosis with neurogenic claudication (724 03) (M48 062)   49  Lymphedema (457 1) (I89 0)   50  Medicare annual wellness visit, initial (V70 0) (Z00 00)   51  Medicare annual wellness visit, subsequent (V70 0) (Z00 00)   52  Morbid obesity with body mass index of 40 0-44 9 in adult (278 01,V85 41)      (E66 01,Z68 41)   53  Nails Onychauxis (703 8)   54  Nausea (787 02) (R11 0)   55  Need for pneumococcal vaccination (V03 82) (Z23)   56  Need for prophylactic vaccination and inoculation against influenza (V04 81) (Z23)   57  Need for shingles vaccine (V04 89) (Z23)   58  Nephrolithiasis (592 0) (N20 0)   59  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)   60  Obstructive sleep apnea (327 23) (G47 33)   61  Onychomycosis (110 1) (B35 1)   62  Orthopnea (786 02) (R06 01)   63  Osteoarthritis, localized, knee (715 36) (M17 10)   64  Osteoporosis screening (V82 81) (Z13 820)   65  Other specified anxiety disorders (300 09) (F41 8)   66  Other specified inflammatory spondylopathies, 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) (M46 80)   67  Overweight (278 02) (E66 3)   68  Pain in both feet (729 5) (M79 671,M79 672)   69  Pain in wrist joint (719 43) (M25 539)   70  Palpitations (785 1) (R00 2)   71  Pes planus, unspecified laterality (734) (M21 40)   72  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)   73  Pseudogout of left wrist (068 97,386 44) (M11 232)   74  Renal disorder (593 9) (N28 9)   75   Rupture of popliteal cyst (727 51) (M66 0)   76  Screening for colon cancer (V76 51) (Z12 11)   77  Screening for diabetes mellitus (DM) (V77 1) (Z13 1)   78  Screening for genitourinary condition (V81 6) (Z13 89)   79  Screening for malignant neoplasm of breast (V76 10) (Z12 31)   80  Short unilateral neuralgiform headache, conjunctival injection/tearing (339 05) (G44 059)   81  Somnolence, daytime (780 54) (R40 0)   82  Spinal stenosis of lumbar region (724 02) (M48 061)   83  Tarsal tunnel syndrome (355 5) (G57 50)   84  Tenderness in limb (729 5) (M79 609)   85  Tinea pedis (110 4) (B35 3)   86  Umbilical hernia (685 3) (K42 9)   87   Visit for screening mammogram (V76 12) (Z12 31)    Past Medical History    · Benign essential hypertension (401 1) (I10)   · Depression screen (V79 0) (Z13 89)   · Hiatal hernia (553 3) (K44 9)   · History of abdominal pain (V13 89) (R33 426)   · History of abdominal pain (V13 89) (D99 893)   · History of arthritis (V13 4) (Z87 39)   · History of backache (V13 59) (Z87 39)   · History of cataract (V12 49) (Z86 69)   · History of eczema (V13 3) (Z87 2)   · History of heart failure (V12 59) (Z86 79)   · History of hepatitis (V12 09) (Z86 19)   · History of onychomycosis (V12 09) (Z86 19)   · History of sleep apnea (V13 89) (Z86 69)   · History of Orthopnea (786 02) (R06 01)    Surgical History    · History of Complete Colonoscopy   · History of Hysterectomy   · History of Incisional Hernia Repair - Incarcerated   · History of Knee Surgery    Family History   Mother    · Family history of Coronary artery disease   · Family history of arthritis (V17 7) (Z82 61)   · Denied: Family history of depression   · Family history of diabetes mellitus (V18 0) (Z83 3)   · Family history of hypertension (V17 49) (Z82 49)   · Denied: Family history of substance abuse  Father    · Denied: Family history of depression   · Family history of hypertension (V17 49) (Z82 49)   · Denied: Family history of substance abuse  Sibling    · Denied: Family history of depression   · Denied: Family history of substance abuse  Family History    · Family history of arthritis (V17 7) (Z82 61)    Social History    · Denied: History of Alcohol Use (History)   · Daily Coffee Consumption (___ Cups/Day)   · Lack of exercise (V69 0) (Z72 3)   ·    · Never a smoker   · Never a smoker   · Sleeps 6 -7 hours a day    Current Meds    1  Allopurinol 300 MG Oral Tablet; take one tablet by mouth every day; Therapy: 03TPQ4987 to (Evaluate:31Khn9780)  Requested for: 51ECP2807; Last     Rx:69Yuf0552 Ordered   2  Amlodipine-Olmesartan 5-20 MG Oral Tablet; TAKE 1 TABLET ONCE DAILY; Therapy: 27AUZ3758 to (Evaluate:24Nov2017)  Requested for: 98Jem5706; Last     Rx:00Sjk2358 Ordered   3  Aspirin 81 MG Oral Tablet Chewable; Therapy: (Recorded:64Nqc4102) to Recorded   4  Bumetanide 2 MG Oral Tablet; TAKE 1 TABLET DAILY; Therapy: 92DBK4132 to (Evaluate:86Dhq3591)  Requested for: 10XYI8594; Last     Rx:70Vry7330 Ordered   5  Colchicine 0 6 MG Oral Tablet; TAKE 1 TABLET DAILY AS DIRECTED; Therapy: 04HGI8726 to (Evaluate:71Rlp2397)  Requested for: 38DAX4630; Last     Rx:37Dem0098 Ordered   6  Gabapentin 300 MG Oral Capsule; TAKE 1 CAPSULE AT BEDTIME; Therapy: 50OHQ9800 to (Evaluate:89Hjc7751)  Requested for: 80EMV8493; Last     Rx:21Yez8122 Ordered   7  Levothyroxine Sodium 100 MCG Oral Tablet; 1 TAb PO Daily in AM;     Therapy: 32CLS8226 to (Last Rx:37Lbx8976)  Requested for: 29OCJ2170 Ordered   8  Omega-3-acid Ethyl Esters 1 GM Oral Capsule; take 2 capsules by mouth twice a day; Therapy: 63CMY2412 to (Evaluate:89Dmm6694)  Requested for: 80Etd8650; Last     Rx:44Zjq9994 Ordered   9  Omeprazole 20 MG Oral Capsule Delayed Release; TAKE 1 CAPSULE EVERY DAY AS     DIRECTED  BY  PHYSICIAN; Therapy: 54JSW7361 to (Perfecto Diver)  Requested for: 10JCZ3342; Last     Rx:02Jbs8909 Ordered   10   Propranolol HCl - 80 MG Oral Tablet; TAKE 1/2 TABLET EVERY 12 HOURS DAILY; Therapy: 44Hzo4589 to (Last Rx:29Ijj1163)  Requested for: 82Gai4747 Ordered   11  Spironolactone 25 MG Oral Tablet; TAKE 1 TABLET DAILY; Therapy: 35HQI5040 to Recorded   12  Vitamin D3 TABS; Take 1 tablet daily; Therapy: (Recorded:02Jun2017) to Recorded   13  Zoster (Zostavax) (Zoster (Zostavax)); INJECT 0 5  ML Subcutaneous; Therapy: 60HPE8731 to (Last Rx:84Vyb4611) Ordered    Allergies   1  No Known Drug Allergies    Procedure      Procedure: Injection of bilateral knee joint  Indication:  Osteoarthritis  Potential complications include bleeding,-- infection-- and-- allergic reaction  Risk, benefits and alternatives were discussed with the patient  Verbal consent was obtained prior to the procedure  Betadine was used to prep the area  ethyl chloride spray was used as a topical anesthetic  Using sterile technique, the aspiration/injection needle was then directed from a Anterolateral aspect  A 22-gauge was used to inject 2mL Euflexxa   A bandage was applied  the patient tolerated the procedure well  Complications: none  Future Appointments      Date/Time Provider Specialty Site   01/03/2018 11:10 AM ERIK Vines   Orthopedic Surgery [facility] [address]   01/09/2018 03:45 PM Estela Swanson DPM Podiatry [facility]   12/27/2017 10:20 AM Richa Connelly [facility] Orthopedic [facility] [address]   02/22/2018 11:00 AM Silvana Jett  [facility]     Signatures    Electronically signed by : REIK Gutierres ; Dec 20 2017 12:38PM EST                       (Author)

## 2017-12-26 ENCOUNTER — ALLSCRIPTS OFFICE VISIT (OUTPATIENT)
Dept: OTHER | Facility: OTHER | Age: 77
End: 2017-12-26

## 2017-12-27 NOTE — PROGRESS NOTES
Assessment   1  Osteoarthritis, localized, knee (745 82) (M17 10)    Plan   Localized osteoarthritis of knees, bilateral    · Euflexxa 20 MG/2ML Intra-articular Solution Prefilled Syringe   · Euflexxa 20 MG/2ML Intra-articular Solution Prefilled Syringe    Discussion/Summary      Cristian Floyd is a very pleasant 78-year-old female with bilateral knee osteoarthritis who has now undergone two of 3 Euflexxa injections for her bilateral knee osteoarthritis  She has tolerated the injections well  She will return next week to complete the series with Dr Bowen Feeling  She is welcome to call at any point with questions or concerns  The patient was counseled regarding diagnostic results,-- instructions for management,-- prognosis,-- patient and family education,-- risks and benefits of treatment options  The patient has the current Goals: Reduce knee pain, increase activity  Patient is able to Self-Care  Possible side effects of new medications were reviewed with the patient/guardian today  Chief Complaint   1  Knee Pain  Bilateral knee Euflexxa injections 2  History of Present Illness   HPI: Cristian Floyd is a very pleasant 78-year-old female with bilateral knee osteoarthritis  She underwent the 1st of 3 Euflexxa injections last week  She has noted a decrease in her pain and improvement in her overall comfort and function  She denies any adverse effects, new injuries, or symptoms  Review of Systems        Constitutional: No fever, no chills, feels well, no tiredness, no recent weight gain or loss  Eyes: No complaints of eyesight problems, no red eyes  ENT: no loss of hearing, no nosebleeds, no sore throat  Cardiovascular: No complaints of chest pain, no palpitations, no leg claudication or lower extremity edema  Respiratory: no compliants of shortness of breath, no wheezing, no cough        Gastrointestinal: no complaints of abdominal pain, no constipation, no nausea or diarrhea, no vomiting, no bloody stools  Genitourinary: no complaints of dysuria, no incontinence  Musculoskeletal: no complaints of arthralgia, no myalgia, no joint swelling or stiffness, no limb pain or swelling  Integumentary: no complaints of skin rash or lesion, no itching or dry skin, no skin wounds  Neurological: no complaints of headache, no confusion, no numbness or tingling, no dizziness  Endocrine: No complaints of muscle weakness, no feelings of weakness, no frequent urination, no excessive thirst       Psychiatric: No suicidal thoughts, no anxiety, no feelings of depression  ROS reviewed  Active Problems   1  Abdominal pain (789 00) (R10 9)   2  Acute bronchitis (466 0) (J20 9)   3  Acute on chronic diastolic congestive heart failure (428 33,428 0) (I50 33)   4  Ankle pain, unspecified laterality   5  Arthropathy of knee (716 96) (M17 10)   6  Atherosclerosis of arteries of extremities (440 20) (I70 209)   7  Benign essential hypertension (401 1) (I10)   8  Breast pain (611 71) (N64 4)   9  Bunion, unspecified laterality   10  Callus (700) (L84)   11  Cellulitis (682 9) (L03 90)   12  Chronic low back pain (724 2,338 29) (M54 5,G89 29)   13  Chronic reflux esophagitis (530 11) (K21 0)   14  Chronic venous insufficiency (459 81) (I87 2)   15  CKD (chronic kidney disease), stage III (585 3) (N18 3)   16  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)   17  Dehydration (276 51) (E86 0)   18  Dermatitis (692 9) (L30 9)   19  Difficulty in walking (719 7) (R26 2)   20  Discoloration of nail (703 8) (L60 8)   21  Diverticulitis of colon (562 11) (K57 32)   22  Dizziness (780 4) (R42)   23  Dyspnea on exertion (786 09) (R06 09)   24  Dystrophic nail (703 8) (L60 3)   25  Dysuria (788 1) (R30 0)   26  Edema (782 3) (R60 9)   27  Electrolyte or fluid disorder (276 9) (E87 8)   28  Elevated triglycerides with high cholesterol (272 2) (E78 2)   29   Encounter for screening for cardiovascular disorders (V81 2) (Z13 6)   30  Encounter for screening mammogram for breast cancer (V76 12) (Z12 31)   31  Esophageal reflux (530 81) (K21 9)   32  Flu vaccine need (V04 81) (Z23)   33  Flu vaccine need (V04 81) (Z23)   34  Gout (274 9) (M10 9)   35  Headache (784 0) (R51)   36  Hiatal hernia (553 3) (K44 9)   37  Hyperlipemia (272 4) (E78 5)   38  Hypertension (401 9) (I10)   39  Hyperuricemia (790 6) (E79 0)   40  Hypothyroidism (244 9) (E03 9)   41  Infectious diarrhea (009 2) (A09)   42  Knee pain (719 46) (M25 569)   43  Leg swelling (729 81) (M79 89)   44  Localized osteoarthritis of knees, bilateral (715 36) (M17 0)   45  Lumbar canal stenosis (724 02) (M48 061)   46  Lumbar disc herniation with radiculopathy (722 10) (M51 16)   47  Lumbar radiculopathy (724 4) (M54 16)   48  Lumbar stenosis with neurogenic claudication (724 03) (M48 062)   49  Lymphedema (457 1) (I89 0)   50  Medicare annual wellness visit, initial (V70 0) (Z00 00)   51  Medicare annual wellness visit, subsequent (V70 0) (Z00 00)   52  Morbid obesity with body mass index of 40 0-44 9 in adult (278 01,V85 41)      (E66 01,Z68 41)   53  Nails Onychauxis (703 8)   54  Nausea (787 02) (R11 0)   55  Need for pneumococcal vaccination (V03 82) (Z23)   56  Need for prophylactic vaccination and inoculation against influenza (V04 81) (Z23)   57  Need for shingles vaccine (V04 89) (Z23)   58  Nephrolithiasis (592 0) (N20 0)   59  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)   60  Obstructive sleep apnea (327 23) (G47 33)   61  Onychomycosis (110 1) (B35 1)   62  Orthopnea (786 02) (R06 01)   63  Osteoarthritis, localized, knee (715 36) (M17 10)   64  Osteoporosis screening (V82 81) (Z13 820)   65  Other specified anxiety disorders (300 09) (F41 8)   66  Other specified inflammatory spondylopathies, 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) (M46 80)   67  Overweight (278 02) (E66 3)   68  Pain in both feet (729 5) (M79 671,M79 672)   69   Pain in wrist joint (719 43) (M25 539)   70  Palpitations (785 1) (R00 2)   71  Pes planus, unspecified laterality (734) (M21 40)   72  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)   73  Pseudogout of left wrist (201 44,411 14) (M11 232)   74  Renal disorder (593 9) (N28 9)   75  Rupture of popliteal cyst (727 51) (M66 0)   76  Screening for colon cancer (V76 51) (Z12 11)   77  Screening for diabetes mellitus (DM) (V77 1) (Z13 1)   78  Screening for genitourinary condition (V81 6) (Z13 89)   79  Screening for malignant neoplasm of breast (V76 10) (Z12 31)   80  Short unilateral neuralgiform headache, conjunctival injection/tearing (339 05) (G44 059)   81  Somnolence, daytime (780 54) (R40 0)   82  Spinal stenosis of lumbar region (724 02) (M48 061)   83  Tarsal tunnel syndrome (355 5) (G57 50)   84  Tenderness in limb (729 5) (M79 609)   85  Tinea pedis (110 4) (B35 3)   86  Umbilical hernia (272 9) (K42 9)   87  Visit for screening mammogram (V76 12) (Z12 31)    Past Medical History    · Benign essential hypertension (401 1) (I10)   · Depression screen (V79 0) (Z13 89)   · Hiatal hernia (553 3) (K44 9)   · History of abdominal pain (V13 89) (Z90 417)   · History of abdominal pain (V13 89) (J98 201)   · History of arthritis (V13 4) (Z87 39)   · History of backache (V13 59) (Z87 39)   · History of cataract (V12 49) (Z86 69)   · History of eczema (V13 3) (Z87 2)   · History of heart failure (V12 59) (Z86 79)   · History of hepatitis (V12 09) (Z86 19)   · History of onychomycosis (V12 09) (Z86 19)   · History of sleep apnea (V13 89) (Z86 69)   · History of Orthopnea (786 02) (R06 01)     The active problems and past medical history were reviewed and updated today  Surgical History    · History of Complete Colonoscopy   · History of Hysterectomy   · History of Incisional Hernia Repair - Incarcerated   · History of Knee Surgery     The surgical history was reviewed and updated today         Family History   Mother    · Family history of Coronary artery disease   · Family history of arthritis (V17 7) (Z82 61)   · Denied: Family history of depression   · Family history of diabetes mellitus (V18 0) (Z83 3)   · Family history of hypertension (V17 49) (Z82 49)   · Denied: Family history of substance abuse  Father    · Denied: Family history of depression   · Family history of hypertension (V17 49) (Z82 49)   · Denied: Family history of substance abuse  Sibling    · Denied: Family history of depression   · Denied: Family history of substance abuse  Family History    · Family history of arthritis (V17 7) (Z82 61)     The family history was reviewed and updated today  Social History    · Denied: History of Alcohol Use (History)   · Daily Coffee Consumption (___ Cups/Day)   · Lack of exercise (V69 0) (Z72 3)   ·    · Never a smoker   · Never a smoker   · Sleeps 6 -7 hours a day  The social history was reviewed and updated today  The social history was reviewed and is unchanged  Current Meds    1  Allopurinol 300 MG Oral Tablet; take one tablet by mouth every day; Therapy: 64BXY3697 to (Evaluate:97Uay8903)  Requested for: 34VIS9326; Last     Rx:48Mtb2459 Ordered   2  Amlodipine-Olmesartan 5-20 MG Oral Tablet; TAKE 1 TABLET ONCE DAILY; Therapy: 53OEU6731 to (Evaluate:24Nov2017)  Requested for: 49Puk5201; Last     Rx:62Fzh6497 Ordered   3  Aspirin 81 MG Oral Tablet Chewable; Therapy: (Recorded:02Pon9972) to Recorded   4  Bumetanide 2 MG Oral Tablet; TAKE 1 TABLET DAILY; Therapy: 41WOL4272 to (Evaluate:51Hmw6588)  Requested for: 16INY2513; Last     Rx:20Pfo5336 Ordered   5  Colchicine 0 6 MG Oral Tablet; TAKE 1 TABLET DAILY AS DIRECTED; Therapy: 68LPS7208 to (Evaluate:71Qlh8088)  Requested for: 18RXS3157; Last     Rx:79Gdk9658 Ordered   6  Gabapentin 300 MG Oral Capsule; TAKE 1 CAPSULE AT BEDTIME; Therapy: 75KHD5751 to (Evaluate:07Hhx2767)  Requested for: 57ILF0659; Last     Rx:45Fkg1804 Ordered   7   Levothyroxine Sodium 100 MCG Oral Tablet; 1 TAb PO Daily in AM;     Therapy: 50REH8006 to (Last Rx:04Mlx2652)  Requested for: 82CCJ9596 Ordered   8  Omega-3-acid Ethyl Esters 1 GM Oral Capsule; take 2 capsules by mouth twice a day; Therapy: 64HLF7530 to (Evaluate:91Ocj3385)  Requested for: 70Yvy4163; Last     Rx:00Fjw8361 Ordered   9  Omeprazole 20 MG Oral Capsule Delayed Release; TAKE 1 CAPSULE EVERY DAY AS     DIRECTED  BY  PHYSICIAN; Therapy: 55JCA8024 to (Oswald Ireland)  Requested for: 14KEG1425; Last     Rx:02Bqs4158 Ordered   10  Propranolol HCl - 80 MG Oral Tablet; TAKE 1/2 TABLET EVERY 12 HOURS DAILY; Therapy: 37Lpz5499 to (Last Rx:84Mwr4855)  Requested for: 99Xhm7504 Ordered   11  Spironolactone 25 MG Oral Tablet; TAKE 1 TABLET DAILY; Therapy: 49WMW5437 to Recorded   12  Vitamin D3 TABS; Take 1 tablet daily; Therapy: (Recorded:02Jun2017) to Recorded   13  Zoster (Zostavax); INJECT 0 5  ML Subcutaneous; Therapy: 99HFN3980 to (Last Rx:37Srj7042) Ordered     The medication list was reviewed and updated today  Allergies   1   No Known Drug Allergies    Vitals   Signs   Heart Rate: 60  Systolic: 035  Diastolic: 69  Height: 5 ft   Weight: 209 lb 4 00 oz  BMI Calculated: 40 87  BSA Calculated: 1 89    Physical Exam        Constitutional - General appearance: Normal       Musculoskeletal - Gait and station: Abnormal -- Muscle strength/tone: Normal -- Lower extremity compartments: Normal       Cardiovascular - Examination of extremities for edema and/or varicosities: Abnormal       Skin - Skin and subcutaneous tissue: Normal -- Palpation of skin and subcutaneous tissue: Normal       Neurologic - Sensation: Normal -- Lower extremity peripheral neuro exam: Normal       Psychiatric - Orientation to person, place, and time: Normal -- Mood and affect: Normal       Free Text - General: She is alert oriented in no acute distress, BMI 40 87 Examination of both lower extremities reveals edema below the knee  She has trace bilateral knee effusion  There is no erythema, ecchymosis, or warmth  Her range of motion is between 0 and approximately 120Â° of flexion seated  She ambulates slowly with an antalgic gait on the use of a single-point cane  Her calves are soft and nontender and she is grossly distally neurovascularly unchanged  Procedure      Procedure: Injection of bilateral knee joint  Indication:  Osteoarthritis  Potential complications include bleeding-- and-- infection  Risk and benefits were discussed with the patient  Verbal consent was obtained prior to the procedure  Alcohol and Betadine was used to prep the area  ethyl chloride spray was used as a topical anesthetic  Using sterile technique, the aspiration/injection needle was then directed from a Anterolateral aspect  A 20-gauge was used to inject 2mL Euflexxa   A bandage was applied  the patient tolerated the procedure well  Complications: none  Follow-up in the office in 1 week(s)  After the risks and benefits of the right knee injection were explained, and verbal consent was obtained for the injection  The right knee was prepped using aseptic technique using isopropyl alcohol and betadine solution  The right knee was successfully injected with 2mL of Euflexxa using a 20 gauge needle  After the injection, hemostasis was achieved, and a dressing was applied  Post-injection icing and activity modification instructions were provided  The patient tolerated the procedure well  the risks and benefits of the left knee injection were explained, and verbal consent was obtained for the injection  The left knee was prepped using aseptic technique using isopropyl alcohol and betadine solution  The left knee was successfully injected with 2mL of Euflexxa using a 20 gauge needle  After the injection, hemostasis was achieved, and a dressing was applied  Post-injection icing and activity modification instructions were provided   The patient tolerated the procedure well  Attending Note   Collaborating Physician Note: I discussed the case with the Advanced Practitioner and reviewed the AP note      Future Appointments      Date/Time Provider Specialty Site   01/03/2018 11:10 AM ERIK Vogel  Orthopedic Surgery [address]   01/09/2018 03:45 PM Kavitha Khoury DPM Podiatry [facility]   02/22/2018 11:00 AM Leighton Esteves MD Family Medicine [address]      Signatures    Electronically signed by : Mitchell Kevin, [facility]; Dec 26 2017  9:34AM EST                       (Author)     Electronically signed by :  Jaymie Herrera DO; Dec 26 2017  9:36AM EST                       (Author)

## 2018-01-09 ENCOUNTER — ALLSCRIPTS OFFICE VISIT (OUTPATIENT)
Dept: OTHER | Facility: OTHER | Age: 78
End: 2018-01-09

## 2018-01-09 NOTE — RESULT NOTES
Verified Results  (1) LIPID PANEL, FASTING [id number] 07:12AM Jake Meng   Triglyceride:         Normal              <150 mg/dl       Borderline High    150-199 mg/dl       High               200-499 mg/dl       Very High          >499 mg/dl  Cholesterol:         Desirable        <200 mg/dl      Borderline High  200-239 mg/dl      High             >239 mg/dl  HDL Cholesterol:        High    >59 mg/dL      Low     <41 mg/dL  LDL CALCULATED:    This screening LDL is a calculated result  It does not have the accuracy of the Direct Measured LDL in the monitoring of patients with hyperlipidemia and/or statin therapy  Direct Measure LDL (NZS900) must be ordered separately in these patients  Test Name Result Flag Reference   CHOLESTEROL 193 mg/dL     HDL,DIRECT 45 mg/dL  40-60   LDL CHOLESTEROL CALCULATED 112 mg/dL  0-130   TRIGLYCERIDES 180 mg/dL H <=150     (1) COMPREHENSIVE METABOLIC PANEL [id number] 73:60IK Lili Meng Kidney Disease Education Program recommendations are as follows:  GFR calculation is accurate only with a steady state creatinine  Chronic Kidney disease less than 60 ml/min/1 73 sq  meters  Kidney failure less than 15 ml/min/1 73 sq  meters  Test Name Result Flag Reference   GLUCOSE,RANDM 104 mg/dL     If the patient is fasting, the ADA then defines impaired fasting glucose as > 100 mg/dL and diabetes as > or equal to 123 mg/dL     SODIUM 144 mmol/L  136-145   POTASSIUM 3 3 mmol/L L 3 5-5 3   CHLORIDE 104 mmol/L  100-108   CARBON DIOXIDE 31 mmol/L  21-32   ANION GAP (CALC) 9 mmol/L  4-13   BLOOD UREA NITROGEN 17 mg/dL  5-25   CREATININE 0 88 mg/dL  0 60-1 30   Standardized to IDMS reference method   CALCIUM 9 0 mg/dL  8 3-10 1   BILI, TOTAL 0 30 mg/dL  0 20-1 00   ALK PHOSPHATAS 122 U/L H    ALT (SGPT) 30 U/L  12-78   AST(SGOT) 20 U/L  5-45   ALBUMIN 3 5 g/dL  3 5-5 0   TOTAL PROTEIN 6 9 g/dL  6 4-8 2   eGFR Non-      >60 0 ml/min/1 73sq m     (1) TSH [id number] 07:12AM Sadaf Meng Cea   Patients undergoing fluorescein dye angiography may retain small amounts of fluorescein in the body for 48-72 hours post procedure  Samples containing fluorescein can produce falsely depressed TSH values  If the patient had this procedure,a specimen should be resubmitted post fluorescein clearance  The recommended reference ranges for TSH during pregnancy are as follows:  First trimester 0 1 to 2 5 uIU/mL  Second trimester  0 2 to 3 0 uIU/mL  Third trimester 0 3 to 3 0 uIU/m     Test Name Result Flag Reference   TSH 1 737 uIU/mL  0 358-3 740       Discussion/Summary   potassium a little low  contine potassium   Other labs stable continue medications

## 2018-01-09 NOTE — RESULT NOTES
Discussion/Summary   stabale exam repeat 1 year     Verified Results  * MAMMO SCREENING BILATERAL W CAD [id number] 02:12PM Lincoln Rasmussen Order Number: [id number]    - Patient Instructions: To schedule this appointment, please contact Central Scheduling at [phone]  Do not wear any perfume, powder, lotion or deodorant on breast or underarm area  Please bring your doctors order, referral (if needed) and insurance information with you on the day of the test  Failure to bring this information may result in this test being rescheduled  Arrive 15 minutes prior to your appointment time to register  On the day of your test, please bring any prior mammogram or breast studies with you that were not performed at a [facility]  Failure to bring prior exams may result in your test needing to be rescheduled  Test Name Result Flag Reference   MAMMO SCREENING BILATERAL W CAD (Report)     Patient History:   Patient is postmenopausal    Benign excisional biopsy of the right breast    Patient's BMI is 37 5  Reason for exam: screening, asymptomatic  Mammo Screening Bilateral W CAD: May 23, 2017 - Check In #:    [de-identified]   Bilateral CC and MLO view(s) were taken  Technologist: Khushbu Valdovinos   Prior study comparison: December 3, 2014, right breast diagnostic   mammogram performed at [place] [address]  June 4, 2013, bilateral screening mammogram performed at [place] [address]  The breast tissue is almost entirely fat  No dominant soft tissue mass, architectural distortion or    suspicious calcifications are noted in either breast  The skin    and nipple structures are within normal limits  Scattered benign   appearing calcifications are noted  No evidence of malignancy  No significant changes when compared with prior studies       ACR BI-RADSï¾® Assessments: BiRad:1 - Negative     Recommendation:   Routine screening mammogram of both breasts in 1 year  A    reminder letter will be scheduled  Analyzed by CAD     8-10% of cancers will be missed on mammography  Management of a    palpable abnormality must be based on clinical grounds  Patients   will be notified of their results via letter from our facility  Accredited by [employer] of Radiology and FDA       Transcription Location: JUNIOR Miller 98: [id number]     Risk Value(s):   Tyrer-Cuzick 10 Year: 2 700%, Tyrer-Cuzick Lifetime: 2 700%,    Myriad Table: 1 5%, CANDACE 5 Year: 2 2%, NCI Lifetime: 4 2%   Signed by:   Waynette Primrose, MD   5/23/17

## 2018-01-10 NOTE — RESULT NOTES
Verified Results  (1) STOOL CULTURE [id number] 01:50AM DenizkarynaTea November     Test Name Result Flag Reference   CLINICAL REPORT (Report)     Test:        Stool culture  Specimen Type:   Stool  Specimen Date:   8/19/2016 1:50 AM  Result Date:    8/21/2016 9:07 AM  Result Status:   Final result  Resulting Lab:    [address] [address]            Tel: [phone]      CULTURE                                       ------------------                                   No Salmonella, Shigella or Campylobacter isolated      Shiga Toxin 1 NOT detected, Shiga Toxin 2 NOT detected

## 2018-01-10 NOTE — PROGRESS NOTES
Assessment    1  Medicare annual wellness visit, initial (V70 0) (Z00 00)   2  Benign essential hypertension (401 1) (I10)   3  Morbid obesity with body mass index of 40 0-44 9 in adult (V85 41) (Z68 41)    Plan  Hypertension    · Bumetanide 2 MG Oral Tablet; TAKE ONE tablet(s) DAILY  Medicare annual wellness visit, initial    · Drink plenty of fluids ; Status:Complete;   Done: 78BIW1396   · Eat a low fat and low cholesterol diet ; Status:Complete;   Done: 59SWG7610   · Keep a diary of when and what you eat ; Status:Complete;   Done: 39LMR7795   · Regular aerobic exercise can help reduce stress ; Status:Complete;   Done: 25SPF6128   · The plan of care for falls is detailed in the plan and/or discussion section of today's note ;  Status:Complete;   Done: 39JSC7573   · There are many exercise options for seniors ; Status:Complete;   Done: 59HLH1827   · There ways to avoid falling ; Status:Complete;   Done: 84MDW5982   · We encourage all of our patients to exercise regularly  30 minutes of exercise or physical  activity five or more days a week is recommended for children and adults ;  Status:Complete;   Done: 84PVM1621   · We encourage you to begin to make lifestyle changes to help control your blood  pressure  These may include losing weight, increasing your activity level, limiting salt in  your diet, decreasing alcohol intake, and eating a diet low in fat and rich in fruits  and vegetables ; Status:Complete;   Done: 77NBA3374   · We recommend a colonoscopy ; Status:Complete;   Done: 45VRZ5114   · We recommend that you create an advance directive ; Status:Complete;   Done:  80KOO7739   · We recommend that you follow these steps to lower your risk of osteoporosis  ;  Status:Complete;   Done: 10MDJ7601  Unlinked    · Penicillin V Potassium 500 MG Oral Tablet   · Spironolactone 25 MG Oral Tablet    Discussion/Summary    Continue medications POA discussed has  F/u dermatology  Start CPAP for WENDI     Impression: Initial Annual Wellness Visit  Cardiovascular screening and counseling: the risks and benefits of screening were discussed  Colorectal cancer screening and counseling: the risks and benefits of screening were discussed  Breast cancer screening and counseling: the risks and benefits of screening were discussed  Glaucoma screening and counseling: the risks and benefits of screening were discussed  Patient Discussion: plan discussed with the patient  Chief Complaint  pt present for an AWV  pt feels well today and would like to review recent lab work  pt needs a refill on medications  pt declines a colon  hg      History of Present Illness  HPI: AWV f/u meds labs PHM   Welcome to Medicare and Wellness Visits: The patient is being seen for the initial annual wellness visit  Medicare Screening and Risk Factors   Hospitalizations: no recent  Medicare Screening Tests Risk Questions   Osteoporosis risk assessment: female gender and over 48years of age  HIV risk assessment: none indicated  Drug and Alcohol Use: The patient has never smoked cigarettes  The patient reports rare alcohol use  She has never used illicit drugs  Diet and Physical Activity: Current diet includes 2 servings of fruit per day, 2 servings of meat per day, 2 servings of whole grains per day, 2 servings of simple carbohydrates per day and 2 servings of dairy products per day  She exercises infrequently  Exercise: 20 minutes per day  Mood Disorder and Cognitive Impairment Screening: PHQ-9 Depression Scale She denies feeling down, depressed, or hopeless over the past two weeks  She denies feeling little interest or pleasure in doing things over the past two weeks  Further Evaluation: anxious about      Cognitive impairment screening: denies difficulty learning/retaining new information, denies difficulty handling complex tasks, denies difficulty with reasoning, denies difficulty with spatial ability and orientation, denies difficulty with language and denies difficulty with behavior  Functional Ability/Level of Safety: Hearing is normal bilaterally and a hearing aid is not used  The patient is currently able to do activities of daily living without limitations, able to do instrumental activities of daily living without limitations, able to participate in social activities without limitations and able to drive without limitations  Activities of daily living details: does not need help using the phone, no transportation help needed, does not need help shopping, no meal preparation help needed, does not need help doing housework, does not need help doing laundry, does not need help managing medications and does not need help managing money  Fall risk factors:  deconditioning, but no polypharmacy, no alcohol use, no mobility impairment, no postural hypotension, no visual impairment, no urinary incontinence and no previous fall  Home safety risk factors:  no unfamiliar surroundings, no loose rugs, no poor household lighting, no uneven floors, no household clutter, grab bars in the bathroom and handrails on the stairs  Advance Directives: Advance directives: living will  end of life decisions were reviewed with the patient  Co-Managers and Medical Equipment/Suppliers: See Patient Care Team   Preventive Quality Program 65 and Older: The patient is currently asymptomatic Symptoms Include:   7/15   Hypertension (Follow-Up): Symptoms:   Medications: the patient is adherent with her medication regimen  Disease Management: the patient is doing well with her blood pressure goals  Additional History: WENDI  Patient Care Team    Care Team Member Role Specialty Office Number   Wilfredo Gonzalez MD  Family Medicine [phone]   Ángel CABALLERO  Orthopedic Surgery [phone]     Active Problems    1  Acute bronchitis (466 0) (J20 9)   2  Ankle pain, unspecified laterality   3  Arthropathy of knee (146 96) (M12 9)   4  Atherosclerosis of arteries of extremities (440 20) (I70 209)   5  Benign essential hypertension (401 1) (I10)   6  Breast pain (611 71) (N64 4)   7  Bunion, unspecified laterality (727 1) (M20 10)   8  Callus (700) (L84)   9  Cellulitis (682 9) (L03 90)   10  Chronic reflux esophagitis (530 11) (K21 0)   11  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)   12  Dermatitis (692 9) (L30 9)   13  Difficulty in walking (719 7) (R26 2)   14  Discoloration of nail (703 8) (L60 8)   15  Dizziness (780 4) (R42)   16  Dyspnea on exertion (786 09) (R06 09)   17  Dystrophic nail (703 8) (L60 3)   18  Dysuria (788 1) (R30 0)   19  Edema (782 3) (R60 9)   20  Electrolyte or fluid disorder (276 9) (E87 8)   21  Encounter for screening for cardiovascular disorders (V81 2) (Z13 6)   22  Esophageal reflux (530 81) (K21 9)   23  Flu vaccine need (V04 81) (Z23)   24  Gout (274 9) (M10 9)   25  Headache (784 0) (R51)   26  Hiatal hernia (553 3) (K44 9)   27  Hypertension (401 9) (I10)   28  Hypothyroidism (244 9) (E03 9)   29  Knee pain (719 46) (M25 569)   30  Leg swelling (729 81) (M79 89)   31  Lumbar canal stenosis (724 02) (M48 06)   32  Lumbar radiculopathy (724 4) (M54 16)   33  Morbid obesity with body mass index of 40 0-44 9 in adult (V85 41) (Z68 41)   34  Nails Onychauxis (703 8)   35  Nausea (787 02) (R11 0)   36  Need for prophylactic vaccination and inoculation against influenza (V04 81) (Z23)   37  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)   38  Onychomycosis (110 1) (B35 1)   39  Orthopnea (786 02) (R06 01)   40  Osteoarthritis, localized, knee (715 36) (M17 9)   41  Other specified anxiety disorders (300 09) (F41 8)   42  Other specified inflammatory spondylopathies, 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) (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)   43  Overweight (278 02) (E66 3)   44  Pain in both feet (729 5) (M79 671,M79 672)   45  Palpitations (785 1) (R00 2)   46  Pes planus, unspecified laterality (734) (M21 40)   47   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)   48  Rupture of popliteal cyst (727 51) (M66 0)   49  Screening for diabetes mellitus (DM) (V77 1) (Z13 1)   50  Screening for malignant neoplasm of breast (V76 10) (Z12 39)   51  Short unilateral neuralgiform headache, conjunctival injection/tearing (339 05) (G44 059)   52  Somnolence, daytime (780 54) (R40 0)   53  Tarsal tunnel syndrome (355 5) (G57 50)   54  Tenderness in limb (729 5) (M79 609)   55  Tinea pedis (110 4) (B35 3)   56  Umbilical hernia (470 0) (K42 9)   57  Visit for screening mammogram (V76 12) (Z12 31)    Past Medical History    · Depression screen (V79 0) (Z13 89)   · History of abdominal pain (V13 89) (Q86 082)   · History of backache (V13 59) (Z87 39)   · History of onychomycosis (V12 09) (Z86 19)   · History of Orthopnea (786 02) (R06 01)   · Screening for genitourinary condition (V81 6) (Z13 89)    Surgical History    · History of Hysterectomy   · History of Incisional Hernia Repair - Incarcerated   · History of Knee Surgery    The surgical history was reviewed and updated today  Family History  Family History    · Family history of arthritis (V17 7) (Z82 61)    Social History    · Denied: History of Alcohol Use (History)   · Daily Coffee Consumption (___ Cups/Day)   · Never a smoker  The social history was reviewed and updated today  The social history was reviewed and is unchanged  Current Meds   1  Allopurinol 300 MG Oral Tablet; 1 every day; Therapy: 07QIW1150 to (Last GC:96SOE7751)  Requested for: 28DZF3893 Ordered   2  Aspirin 81 MG Oral Tablet Chewable; Therapy: (Recorded:10Mar2016) to Recorded   3  Bumetanide 2 MG Oral Tablet; TAKE ONE tablet(s) DAILY; Therapy: 99RUD6098 to (Evaluate:08Avt6422)  Requested for: 60HFO2441; Last   Rx:54Tyw6924 Ordered   4  Clobetasol Propionate 0 05 % External Ointment; APPLY AND GENTLY MASSAGE INTO   AFFECTED AREA(S) TWICE DAILY;    Therapy: 21XQH6810 to (Last Rx:04Jun2015)  Requested for: 29DLM5774 Ordered   5  Gabapentin 100 MG Oral Capsule; TAKE 1 CAPSULE 3 TIMES DAILY; Therapy: 29Apr2016 to (Evaluate:28Jun2016)  Requested for: 29Apr2016; Last   Rx:29Apr2016 Ordered   6  Klor-Con M20 20 MEQ Oral Tablet Extended Release; DISSOLVE 1 TABLET IN 4-6 OZ   OF             LIQUID ONCE DAILY; Therapy: 27KRD7461 to (Evaluate:18Pnr8952)  Requested for: 01BNU5751; Last   Rx:09May2016 Ordered   7  Levothyroxine Sodium 125 MCG Oral Tablet; TAKE ONE TABLET BY MOUTH ONCE   DAILY  Requested for: 44LLE0642; Last Rx:76Esw7642 Ordered   8  Omeprazole 20 MG Oral Capsule Delayed Release; TAKE BY MOUTH 1 CAPSULE   DAILY AS DIRECTED BY PHYSICIAN; Therapy: 17ZSO2295 to (Evaluate:24Mar2016)  Requested for: 54IXR3181; Last   Rx:30Mar2015 Ordered   9  Penicillin V Potassium 500 MG Oral Tablet; Therapy: 20JOH6224 to Recorded   10  Potassium Chloride Savana ER 20 MEQ Oral Tablet Extended Release; DISSOLVE 1    TABLET IN 4-6 OZ OF  LIQUID AND DRINK ONCE DAILY; Therapy: 24Fbq4351 to (Evaluate:18Jun2016)  Requested for: 67Cdm2806; Last    Rx:74Kvb7634 Ordered   11  Red Yeast Rice 600 MG Oral Tablet; 1 Every Day; Therapy: 87DVX6000 to  Requested for: 89TRT4922 Recorded   12  Spironolactone 25 MG Oral Tablet; 1 every day; Therapy: 47HEY4850 to Recorded   13  Spironolactone 25 MG Oral Tablet; Therapy: 01Apr2016 to Recorded   14  Tribenzor 40-10-12 5 MG Oral Tablet; TAKE ONE TABLET(S) EVERY DAY IN    THEMORNING; Therapy: 24LRQ4712 to (Evaluate:06Ayb6391)  Requested for: 82TEO1425; Last    Rx:09May2016 Ordered   15  Zolpidem Tartrate 5 MG Oral Tablet; Therapy: 64EKH7660 to Recorded    The medication list was reviewed and updated today  Allergies    1  No Known Drug Allergies    Immunizations   ** Printed in Appendix #1 below       Vitals  Signs [Data Includes: Current Encounter]    Temperature: 98 1 F  Heart Rate: 68  Respiration: 20  Systolic: 154  Diastolic: 70  Height: 5 ft   Weight: 214 lb   BMI Calculated: 41 79  BSA Calculated: 1 92    Physical Exam    Constitutional   General appearance: Abnormal   obese  overweight  Eyes   Conjunctiva and lids: No swelling, erythema or discharge  glasses  Ears, Nose, Mouth, and Throat   External inspection of ears and nose: Normal     Oropharynx: Normal with no erythema, edema, exudate or lesions  Pulmonary   Respiratory effort: No increased work of breathing or signs of respiratory distress  Auscultation of lungs: Clear to auscultation  Cardiovascular   Auscultation of heart: Normal rate and rhythm, normal S1 and S2, without murmurs  Examination of extremities for edema and/or varicosities: Abnormal   bilateral ankle 3+ pitting edema and bilateral pretibial 3+ pitting edema  varicosities noted bilaterally  Carotid pulses: Normal     Abdomen   Abdomen: Abnormal   The abdomen was rounded and obese  Bowel sounds were normal  There was moderate tenderness in the periumbilical area  4 cm mass palpated in the periumbilical area  The abdomen was normal to percussion and did not have ascites  no CVA tenderness   Lymphatic   Palpation of lymph nodes in neck: No lymphadenopathy  Musculoskeletal   Gait and station: Normal     Digits and nails: Normal without clubbing or cyanosis  DJD kees  Skin keloid scar umbilical area  Neurologic   Cranial nerves: Cranial nerves 2-12 intact  Reflexes: 2+ and symmetric  Sensation: No sensory loss  Psychiatric   Orientation to person, place, and time: Normal     Mood and affect: Normal     Additional Exam:  eczema fingers/legs          Future Appointments    Date/Time Provider Specialty Site   2016 11:30 AM Nelida Martins [facility]     Signatures   Electronically signed by : Augusta Serrano MD; May 20 2016  9:26AM EST                       (Author)    Appendix #1     Patient: Fabio Grant ; : 1940; MRN: [id number]      1 2 3 4 5 6    Influenza  86HIL3007 90QTY7387 97UCB9723 36HBM9990 63HIE7871 13VUT1540    Pneumococcal  09QRO3002

## 2018-01-10 NOTE — RESULT NOTES
Verified Results  (1) CBC/PLT/DIFF 82DFH7830 07:41AM Freda Meng Nikolas Order Number: [id number]     Test Name Result Flag Reference   WBC COUNT 6 83 Thousand/uL  4 31-10 16   RBC COUNT 4 15 Million/uL  3 81-5 12   HEMOGLOBIN 11 9 g/dL  11 5-15 4   HEMATOCRIT 37 4 %  34 8-46  1   MCV 90 fL  82-98   MCH 28 7 pg  26 8-34 3   MCHC 31 8 g/dL  31 4-37 4   RDW 13 9 %  11 6-15 1   MPV 11 7 fL  8 9-12 7   PLATELET COUNT 712 Thousands/uL  149-390   nRBC AUTOMATED 0 /100 WBCs     NEUTROPHILS RELATIVE PERCENT 70 %  43-75   LYMPHOCYTES RELATIVE PERCENT 19 %  14-44   MONOCYTES RELATIVE PERCENT 9 %  4-12   EOSINOPHILS RELATIVE PERCENT 2 %  0-6   BASOPHILS RELATIVE PERCENT 0 %  0-1   NEUTROPHILS ABSOLUTE COUNT 4 74 Thousands/? ??L  1 85-7 62   LYMPHOCYTES ABSOLUTE COUNT 1 30 Thousands/? ??L  0 60-4 47   MONOCYTES ABSOLUTE COUNT 0 62 Thousand/? ??L  0 17-1 22   EOSINOPHILS ABSOLUTE COUNT 0 13 Thousand/? ??L  0 00-0 61   BASOPHILS ABSOLUTE COUNT 0 01 Thousands/? ??L  0 00-0 10   - Patient Instructions: This bloodwork is non-fasting  Please drink two glasses of water morning of bloodwork       (1) COMPREHENSIVE METABOLIC PANEL 30MIE9579 32:29DD KevinsabihaFreda Order Number: [id number]     Test Name Result Flag Reference   SODIUM 139 mmol/L  136-145   POTASSIUM 4 7 mmol/L  3 5-5 3   CHLORIDE 102 mmol/L  100-108   CARBON DIOXIDE 28 mmol/L  21-32   ANION GAP (CALC) 9 mmol/L  4-13   BLOOD UREA NITROGEN 74 mg/dL H 5-25   CREATININE 1 85 mg/dL H 0 60-1 30   Standardized to IDMS reference method   CALCIUM 9 5 mg/dL  8 3-10 1   BILI, TOTAL 0 54 mg/dL  0 20-1 00   ALK PHOSPHATAS 87 U/L     ALT (SGPT) 18 U/L  12-78   AST(SGOT) 12 U/L  5-45   ALBUMIN 3 9 g/dL  3 5-5 0   TOTAL PROTEIN 7 6 g/dL  6 4-8 2   eGFR Non-African American 26 4 ml/min/1 73sq m     National Kidney Disease Education Program recommendations are as follows:  GFR calculation is accurate only with a steady state creatinine  Chronic Kidney disease less than 60 ml/min/1 73 sq  meters  Kidney failure less than 15 ml/min/1 73 sq  meters  GLUCOSE FASTING 99 mg/dL  65-99     (1) LIPID PANEL, FASTING 75BJU4696 07:41AM Cristobal Meng Order Number: [id number]     Test Name Result Flag Reference   CHOLESTEROL 220 mg/dL H    HDL,DIRECT 51 mg/dL  40-60   Specimen collection should occur prior to Metamizole administration due to the potential for falsely depressed results  LDL CHOLESTEROL CALCULATED 130 mg/dL H 0-100   - Patient Instructions: This is a fasting blood test  Water,black tea or black  coffee only after 9:00pm the night before test   Drink 2 glasses of water the morning of test       Triglyceride:         Normal              <150 mg/dl       Borderline High    150-199 mg/dl       High               200-499 mg/dl       Very High          >499 mg/dl  Cholesterol:         Desirable        <200 mg/dl      Borderline High  200-239 mg/dl      High             >239 mg/dl  HDL Cholesterol:        High    >59 mg/dL      Low     <41 mg/dL  LDL CALCULATED:    This screening LDL is a calculated result  It does not have the accuracy of the Direct Measured LDL in the monitoring of patients with hyperlipidemia and/or statin therapy  Direct Measure LDL (IRZ918) must be ordered separately in these patients  TRIGLYCERIDES 194 mg/dL H <=150   Specimen collection should occur prior to N-Acetylcysteine or Metamizole administration due to the potential for falsely depressed results  (1) TSH 50KCC5313 07:41AM Cristobal Meng Order Number: [id number]     Test Name Result Flag Reference   TSH 1 300 uIU/mL  0 358-3 740   - Patient Instructions: This bloodwork is non-fasting  Please drink two glasses of water morning of bloodwork  Patients undergoing fluorescein dye angiography may retain small amounts of fluorescein in the body for 48-72 hours post procedure   Samples containing fluorescein can produce falsely depressed TSH values  If the patient had this procedure,a specimen should be resubmitted post fluorescein clearance            The recommended reference ranges for TSH during pregnancy are as follows:  First trimester 0 1 to 2 5 uIU/mL  Second trimester  0 2 to 3 0 uIU/mL  Third trimester 0 3 to 3 0 uIU/m       Plan  Hypertension    · From  Bumetanide 2 MG Oral Tablet take 1 tablet every day To Bumetanide 2  MG Oral Tablet TAKE 1 TABLET Every other day

## 2018-01-11 NOTE — PROGRESS NOTES
Assessment   1  Atherosclerosis of arteries of extremities (440 20) (I70 209)   2  Onychomycosis (110 1) (B35 1)    Plan    · *VB - Foot Exam; Status:Complete;   Done: 33XUZ8759 08:50AM   · Follow-up visit in 9 weeks Evaluation and Treatment  Follow-up  Status: Hold For -    Scheduling  Requested for: 90PFK9629   · Diabetes Foot Exam Performed; Status:Complete;   Done: 01AIS5297 08:50AM   · Inspect your feet daily ; Status:Complete;   Done: 98MGV1479 08:50AM   · It is important to take good care of your feet if you have diabetes ; Status:Complete;      Done: 25BIB2745 08:50AM    Discussion/Summary   The patient was counseled regarding instructions for management,-- patient and family education,-- importance of compliance with treatment  Patient is able to Self-Care  The treatment plan was reviewed with the patient/guardian  The patient/guardian understands and agrees with the treatment plan      Chief Complaint   feet care      History of Present Illness   HPI: Patient complains of pain in her feet with ambulation  She is pain around the big toe joints  There is no history of trauma  Today the patient is concerned with her swollen legs  She does not have pain in her legs, however, they are itchy and red  She suffers from edema  She does take water pill  Patient is now been diagnosed with renal compromise  Review of Systems        Constitutional: No fever, no chills, feels well, no tiredness, no recent weight gain or loss  Eyes: No complaints of eyesight problems, no red eyes  ENT: no loss of hearing, no nosebleeds, no sore throat  Cardiovascular: No complaints of chest pain, no palpitations, no leg claudication or lower extremity edema  Respiratory: no compliants of shortness of breath, no wheezing, no cough  Gastrointestinal: no complaints of abdominal pain, no constipation, no nausea or diarrhea, no vomiting, no bloody stools        Genitourinary: no complaints of dysuria, no incontinence  Musculoskeletal: arthralgias,-- limb pain-- and-- myalgias, but-- as noted in HPI  Integumentary: no complaints of skin rash or lesion, no itching or dry skin, no skin wounds  Neurological: numbness-- and-- tingling, but-- no complaints of headache, no confusion, no numbness or tingling, no dizziness-- and-- as noted in HPI  Endocrine: No complaints of muscle weakness, no feelings of weakness, no frequent urination, no excessive thirst-- and-- as noted in HPI  feelings of weakness      Psychiatric: No suicidal thoughts, no anxiety, no feelings of depression  Active Problems   1  Abdominal pain (789 00) (R10 9)   2  Acute bronchitis (466 0) (J20 9)   3  Acute on chronic diastolic congestive heart failure (428 33,428 0) (I50 33)   4  Ankle pain, unspecified laterality   5  Arthropathy of knee (716 96) (M17 10)   6  Atherosclerosis of arteries of extremities (440 20) (I70 209)   7  Benign essential hypertension (401 1) (I10)   8  Breast pain (611 71) (N64 4)   9  Bunion, unspecified laterality   10  Callus (700) (L84)   11  Cellulitis (682 9) (L03 90)   12  Chronic low back pain (724 2,338 29) (M54 5,G89 29)   13  Chronic reflux esophagitis (530 11) (K21 0)   14  Chronic venous insufficiency (459 81) (I87 2)   15  CKD (chronic kidney disease), stage III (585 3) (N18 3)   16  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)   17  Dehydration (276 51) (E86 0)   18  Dermatitis (692 9) (L30 9)   19  Difficulty in walking (719 7) (R26 2)   20  Discoloration of nail (703 8) (L60 8)   21  Diverticulitis of colon (562 11) (K57 32)   22  Dizziness (780 4) (R42)   23  Dyspnea on exertion (786 09) (R06 09)   24  Dystrophic nail (703 8) (L60 3)   25  Dysuria (788 1) (R30 0)   26  Edema (782 3) (R60 9)   27  Electrolyte or fluid disorder (276 9) (E87 8)   28  Elevated triglycerides with high cholesterol (272 2) (E78 2)   29   Encounter for screening for cardiovascular disorders (V81 2) (Z13 6)   30  Encounter for screening mammogram for breast cancer (V76 12) (Z12 31)   31  Esophageal reflux (530 81) (K21 9)   32  Flu vaccine need (V04 81) (Z23)   33  Flu vaccine need (V04 81) (Z23)   34  Gout (274 9) (M10 9)   35  Headache (784 0) (R51)   36  Hiatal hernia (553 3) (K44 9)   37  Hyperlipemia (272 4) (E78 5)   38  Hypertension (401 9) (I10)   39  Hyperuricemia (790 6) (E79 0)   40  Hypothyroidism (244 9) (E03 9)   41  Infectious diarrhea (009 2) (A09)   42  Knee pain (719 46) (M25 569)   43  Leg swelling (729 81) (M79 89)   44  Localized osteoarthritis of knees, bilateral (715 36) (M17 0)   45  Lumbar canal stenosis (724 02) (M48 061)   46  Lumbar disc herniation with radiculopathy (722 10) (M51 16)   47  Lumbar radiculopathy (724 4) (M54 16)   48  Lumbar stenosis with neurogenic claudication (724 03) (M48 062)   49  Lymphedema (457 1) (I89 0)   50  Medicare annual wellness visit, initial (V70 0) (Z00 00)   51  Medicare annual wellness visit, subsequent (V70 0) (Z00 00)   52  Morbid obesity with body mass index of 40 0-44 9 in adult (278 01,V85 41)      (E66 01,Z68 41)   53  Nails Onychauxis (703 8)   54  Nausea (787 02) (R11 0)   55  Need for pneumococcal vaccination (V03 82) (Z23)   56  Need for prophylactic vaccination and inoculation against influenza (V04 81) (Z23)   57  Need for shingles vaccine (V04 89) (Z23)   58  Nephrolithiasis (592 0) (N20 0)   59  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)   60  Obstructive sleep apnea (327 23) (G47 33)   61  Onychomycosis (110 1) (B35 1)   62  Orthopnea (786 02) (R06 01)   63  Osteoarthritis, localized, knee (715 36) (M17 10)   64  Osteoporosis screening (V82 81) (Z13 820)   65  Other specified anxiety disorders (300 09) (F41 8)   66  Other specified inflammatory spondylopathies, 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) (M46 80)   67  Overweight (278 02) (E66 3)   68  Pain in both feet (729 5) (M79 671,M79 672)   69   Pain in wrist joint (719 43) (M25 539)   70  Palpitations (785 1) (R00 2)   71  Pes planus, unspecified laterality (734) (M21 40)   72  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)   73  Pseudogout of left wrist (642 37,167 85) (M11 232)   74  Renal disorder (593 9) (N28 9)   75  Rupture of popliteal cyst (727 51) (M66 0)   76  Screening for colon cancer (V76 51) (Z12 11)   77  Screening for diabetes mellitus (DM) (V77 1) (Z13 1)   78  Screening for genitourinary condition (V81 6) (Z13 89)   79  Screening for malignant neoplasm of breast (V76 10) (Z12 31)   80  Short unilateral neuralgiform headache, conjunctival injection/tearing (339 05) (G44 059)   81  Somnolence, daytime (780 54) (R40 0)   82  Spinal stenosis of lumbar region (724 02) (M48 061)   83  Tarsal tunnel syndrome (355 5) (G57 50)   84  Tenderness in limb (729 5) (M79 609)   85  Tinea pedis (110 4) (B35 3)   86  Umbilical hernia (203 5) (K42 9)   87  Visit for screening mammogram (V76 12) (Z12 31)    Past Medical History    · Benign essential hypertension (401 1) (I10)   · Depression screen (V79 0) (Z13 89)   · Hiatal hernia (553 3) (K44 9)   · History of abdominal pain (V13 89) (J19 494)   · History of abdominal pain (V13 89) (C26 600)   · History of arthritis (V13 4) (Z87 39)   · History of backache (V13 59) (Z87 39)   · History of cataract (V12 49) (Z86 69)   · History of eczema (V13 3) (Z87 2)   · History of heart failure (V12 59) (Z86 79)   · History of hepatitis (V12 09) (Z86 19)   · History of onychomycosis (V12 09) (Z86 19)   · History of sleep apnea (V13 89) (Z86 69)   · History of Orthopnea (786 02) (R06 01)     The active problems and past medical history were reviewed and updated today  Surgical History    · History of Complete Colonoscopy   · History of Hysterectomy   · History of Incisional Hernia Repair - Incarcerated   · History of Knee Surgery     The surgical history was reviewed and updated today         Family History   Mother    · Family history of Coronary artery disease   · Family history of arthritis (V17 7) (Z82 61)   · Denied: Family history of depression   · Family history of diabetes mellitus (V18 0) (Z83 3)   · Family history of hypertension (V17 49) (Z82 49)   · Denied: Family history of substance abuse  Father    · Denied: Family history of depression   · Family history of hypertension (V17 49) (Z82 49)   · Denied: Family history of substance abuse  Sibling    · Denied: Family history of depression   · Denied: Family history of substance abuse  Family History    · Family history of arthritis (V17 7) (Z82 61)     The family history was reviewed and updated today  Social History    · Denied: History of Alcohol Use (History)   · Daily Coffee Consumption (___ Cups/Day)   · Lack of exercise (V69 0) (Z72 3)   ·    · Never a smoker   · Never a smoker   · Sleeps 6 -7 hours a day  The social history was reviewed and updated today  The social history was reviewed and is unchanged  Current Meds    1  Allopurinol 300 MG Oral Tablet; take one tablet by mouth every day; Therapy: 74HHD2440 to (Evaluate:45Irh4576)  Requested for: 76CJJ7483; Last     Rx:36Uvb0343 Ordered   2  Amlodipine-Olmesartan 5-20 MG Oral Tablet; TAKE 1 TABLET ONCE DAILY; Therapy: 09JLS1893 to (Evaluate:24Nov2017)  Requested for: 36Uun2262; Last     Rx:19Vsu4355 Ordered   3  Aspirin 81 MG Oral Tablet Chewable; Therapy: (Recorded:69Eue9076) to Recorded   4  Bumetanide 2 MG Oral Tablet; TAKE 1 TABLET DAILY; Therapy: 38SJV3498 to (Evaluate:05Vvn2362)  Requested for: 86ESS3424; Last     Rx:24Cbj0915 Ordered   5  Colchicine 0 6 MG Oral Tablet; TAKE 1 TABLET DAILY AS DIRECTED; Therapy: 38YVQ9832 to (Evaluate:17Ruh2862)  Requested for: 98FFT7954; Last     Rx:28Puf6445 Ordered   6  Gabapentin 300 MG Oral Capsule; TAKE 1 CAPSULE AT BEDTIME; Therapy: 03BJV7438 to (Evaluate:02Bgn9578)  Requested for: 55TII9308; Last     Rx:44Kkn7022 Ordered   7   Levothyroxine Sodium 100 MCG Oral Tablet; 1 TAb PO Daily in AM;     Therapy: 02IRX1909 to (Last Rx:82Asu9064)  Requested for: 43QCG8820 Ordered   8  Omega-3-acid Ethyl Esters 1 GM Oral Capsule; take 2 capsules by mouth twice a day; Therapy: 54RAL2116 to (Evaluate:00Bib9959)  Requested for: 12Qwb1527; Last     Rx:84Qwq4950 Ordered   9  Omeprazole 20 MG Oral Capsule Delayed Release; TAKE 1 CAPSULE EVERY DAY AS     DIRECTED  BY  PHYSICIAN; Therapy: 37JVY2114 to (Melisa Briceño)  Requested for: 28YFH3044; Last     Rx:98Mdc5358 Ordered   10  Propranolol HCl - 80 MG Oral Tablet; TAKE 1/2 TABLET EVERY 12 HOURS DAILY; Therapy: 51Wfb0074 to (Last Rx:95Bee5904)  Requested for: 26Yku8610 Ordered   11  Spironolactone 25 MG Oral Tablet; TAKE 1 TABLET DAILY; Therapy: 85KZL5091 to Recorded   12  Vitamin D3 TABS; Take 1 tablet daily; Therapy: (Recorded:02Jun2017) to Recorded   13  Zoster (Zostavax); INJECT 0 5  ML Subcutaneous; Therapy: 21SPQ9883 to (Last Rx:43Ibk2655) Ordered     The medication list was reviewed and updated today  Allergies   1  No Known Drug Allergies    Physical Exam   Left Foot: Appearance: Normal except as noted: excessive pronation-- and-- pes planus  Right Foot: Appearance: Normal except as noted: excessive pronation-- and-- pes planus  Tenderness: None except the calcaneous,-- medial calcaneous-- and-- insertion of the plantar fascia  Left Ankle: Appearance: Normal except ecchymosis-- and-- swelling laterally  ROM: limited ROM in all planes    Right Ankle: ROM: limited ROM in all planes Motor: diffuse weakness  Neurological Exam: performed  Light touch was intact bilaterally  Vibratory sensation was intact bilaterally  Response to monofilament test was intact bilaterally  Deep tendon reflexes: patellar reflex present bilaterally-- and-- achilles reflex present bilaterally  Vascular Exam: performed Dorsalis pedis pulses were 1/4 bilaterally   Posterior tibial pulses were 1/4 bilaterally  Elevation Pallor: diminished bilaterally  Capillary refill time was greater than 3 seconds bilaterally-- and-- Q9 findings bilateral  Negative digital hair noted  Positive abnormal cooling bilateral  Edema: moderate bilaterally-- and-- 6/7 pitting edema  Negative Homans sign  Toenails: All of the toenails were elongated,-- hypertrophied,-- discolored-- and-- Mycotic with onychogryphosis  Note is made of bilateral tinea pedis in moccasin foot  Distribution  Socks and shoes removed, Right Foot Findings: swollen, erythematous and dry  The sensory exam showed diminished vibratory sensation at the level of the toes  Diminished tactile sensation with monofilament testing throughout the right foot  Socks and shoes removed, Left Foot Findings: swollen, erythematous and dry  The sensory exam showed diminished vibratory sensation at the level of the toes  Diminished tactile sensation with monofilament testing throughout the left foot  Capillary refills findings on the right were delayed in the toes  Pulses:      1+ in the posterior tibialis on the right      1+ in the dorsalis pedis on the right  Capillary refills findings on the left were delayed in the toes  Pulses:      1+ in the posterior tibialis on the left      1+ in the dorsalis pedis on the left  Assign Risk Category: 2: Loss of protective sensation with or without weakness, deformity, callus, pre-ulcer, or history of ulceration  High risk  Hyperkeratosis: present on both first toes,-- present on both first sub metatarsals-- and-- Bilateral plantar moccasin tinea pedis noted  Shoe Gear Evaluation: performed ()  Recommendation(s): SAS style  Procedure   All mycotic nails debrided today  Bilateral pre-ulcerative lesions debrided today  Procedures performed without pain or complication      Attending Note   Attending Note [facility]: Key Parts of the Exam: Patient has continued leg pain   This is due to radiculopathy   Monseconrad Heriberto      Future Appointments      Date/Time Provider Specialty Site   03/13/2018 03:45 PM Fred Plummer DPM Podiatry [facility]ARY LEVINE DPM    02/22/2018 11:00 AM Milena White MD Family Medicine [address]     Signatures    Electronically signed by : Justin Jensen DPM; Berny 10 2018  8:50AM EST                       (Author)

## 2018-01-11 NOTE — RESULT NOTES
Verified Results  * CT ABDOMEN PELVIS WO CONTRAST 35Kxr4614 09:00AM Terrell Meng     Test Name Result Flag Reference   CT ABDOMEN PELVIS WO CONTRAST (Report)     CT ABDOMEN AND PELVIS WITHOUT IV CONTRAST     INDICATION: Mid abdominal pain for 2 days  Prior hernia repair  Stomachache  COMPARISON: 10/15/2013     TECHNIQUE: CT examination of the abdomen and pelvis was performed without intravenous contrast  This examination, like all CT scans performed in the [facility], was performed utilizing techniques to minimize radiation dose exposure,    including the use of iterative reconstruction and automated exposure control  Axial, sagittal, and coronal reformatted images were submitted for interpretation  Intravenous contrast was not administered as part of this examination  Enteric contrast    was administered  FINDINGS:     ABDOMEN     LOWER CHEST: There is a stable tiny nodule within the right middle lobe on image 4, measuring about 3 mm  There is very bulky calcification noted along the mitral annulus  LIVER/BILIARY TREE: Unremarkable  GALLBLADDER: No calcified gallstones  No pericholecystic inflammatory change  SPLEEN: Unremarkable  PANCREAS: Unremarkable  ADRENAL GLANDS: Unremarkable  KIDNEYS/URETERS: Unremarkable  No hydronephrosis  STOMACH AND BOWEL: There is tiny hiatal hernia  There is no bowel obstruction  APPENDIX: No findings to suggest appendicitis  ABDOMINOPELVIC CAVITY: No ascites or free intraperitoneal air  No lymphadenopathy  There is stable minimal hazy density in the mesentery  VESSELS: Atherosclerotic changes are present  No evidence of aneurysm  PELVIS     REPRODUCTIVE ORGANS: There is calcification in the uterus compatible with degenerated leiomyoma  This is about a centimeter  URINARY BLADDER: Unremarkable       ABDOMINAL WALL/INGUINAL REGIONS: There is some scarring at the umbilicus as well as evidence of prior hernia repair without recurrence  Inguinal regions are unremarkable  OSSEOUS STRUCTURES: There is multilevel moderate disc degeneration throughout the lumbar spine and there is grade 1 anterolisthesis L4 on L5  There appears to be mild to moderate central canal stenosis at L4-L5 and there is bilateral L4-L5 and L5-S1    foraminal narrowing, left side more severe than right  IMPRESSION:     No acute abdominopelvic pathology identified  Workstation performed: [id number]     Signed by:   Salbador Gonzalez MD   8/19/16     (1) CBC/PLT/DIFF 46WSH0970 02:06PM Brett Meng Order Number: [id number]     Test Name Result Flag Reference   WBC COUNT 7 90 Thousand/uL  4 80-10 80   WBC ADJUSTED 7 90 Thousand/uL  4 80-10 80   RBC COUNT 3 97 Million/uL L 4 20-5 40   HEMOGLOBIN 11 5 g/dL L 12 0-16 0   HEMATOCRIT 34 8 % L 37 0-47 0   MCV 88 fL  82-98   MCH 29 0 pg  27 0-31 0   MCHC 33 2 g/dL  31 4-37 4   RDW 17 4 % H 11 6-15 1   MPV 10 0 fL  8 9-12 7   PLATELET COUNT 109 Thousands/uL  130-400   NEUTROPHILS RELATIVE PERCENT 75 %  43-75   LYMPHOCYTES RELATIVE PERCENT 10 % L 14-44   MONOCYTES RELATIVE PERCENT 11 %  4-12   EOSINOPHILS RELATIVE PERCENT 3 %  0-6   BASOPHILS RELATIVE PERCENT 0 %  0-1   NEUTROPHILS ABSOLUTE COUNT 5 90 Thousands/?L  1 85-7 62   LYMPHOCYTES ABSOLUTE COUNT 0 80 Thousands/?L  0 60-4 47   MONOCYTES ABSOLUTE COUNT 0 90 Thousand/?L  0 17-1 22   EOSINOPHILS ABSOLUTE COUNT 0 30 Thousand/?L  0 00-0 61   BASOPHILS ABSOLUTE COUNT 0 00 Thousands/?L  0 00-0 10   - Patient Instructions: This bloodwork is non-fasting  Please drink two glasses of water morning of bloodwork  (1) COMPREHENSIVE METABOLIC PANEL 16ISB0673 52:17OU Brett Meng Order Number: [id number]     Test Name Result Flag Reference   GLUCOSE,RANDM 114 mg/dL     If the patient is fasting, the ADA then defines impaired fasting glucose as > 100 mg/dL and diabetes as > or equal to 123 mg/dL     SODIUM 140 mmol/L  136-145   POTASSIUM 5 6 mmol/L H 3 5-5 3   CHLORIDE 107 mmol/L  100-108   CARBON DIOXIDE 20 mmol/L L 21-32   ANION GAP (CALC) 13 mmol/L  4-13   BLOOD UREA NITROGEN 102 mg/dL H 5-25   CREATININE 2 30 mg/dL H 0 60-1 30   Standardized to IDMS reference method   CALCIUM 9 3 mg/dL  8 3-10 1   BILI, TOTAL 0 40 mg/dL  0 20-1 00   ALK PHOSPHATAS 126 U/L H    ALT (SGPT) 22 U/L  12-78   AST(SGOT) 9 U/L  5-45   ALBUMIN 3 8 g/dL  3 5-5 0   TOTAL PROTEIN 7 8 g/dL  6 4-8 2   eGFR Non-African American 20 6 ml/min/1 73sq m     [facility] Disease Education Program recommendations are as follows:  GFR calculation is accurate only with a steady state creatinine  Chronic Kidney disease less than 60 ml/min/1 73 sq  meters  Kidney failure less than 15 ml/min/1 73 sq  meters

## 2018-01-11 NOTE — RESULT NOTES
Verified Results  (1) HEMOGLOBIN A1C [id number] 08:09AM [facility] Order Number: [id number]     Test Name Result Flag Reference   HEMOGLOBIN A1C 5 6 %  4 2-6 3   EST  AVG  GLUCOSE 114 mg/dl       (1) CBC/PLT/DIFF [id number] 08:08AM [facility] Order Number: [id number]     Test Name Result Flag Reference   WBC COUNT 6 25 Thousand/uL  4 31-10 16   RBC COUNT 4 07 Million/uL  3 81-5 12   HEMOGLOBIN 11 5 g/dL  11 5-15 4   HEMATOCRIT 35 1 %  34 8-46  1   MCV 86 fL  82-98   MCH 28 3 pg  26 8-34 3   MCHC 32 8 g/dL  31 4-37 4   RDW 14 4 %  11 6-15 1   MPV 11 5 fL  8 9-12 7   PLATELET COUNT 180 Thousands/uL  149-390   nRBC AUTOMATED 0 /100 WBCs     NEUTROPHILS RELATIVE PERCENT 70 %  43-75   LYMPHOCYTES RELATIVE PERCENT 18 %  14-44   MONOCYTES RELATIVE PERCENT 10 %  4-12   EOSINOPHILS RELATIVE PERCENT 2 %  0-6   BASOPHILS RELATIVE PERCENT 0 %  0-1   NEUTROPHILS ABSOLUTE COUNT 4 29 Thousands/? ??L  1 85-7 62   LYMPHOCYTES ABSOLUTE COUNT 1 13 Thousands/? ??L  0 60-4 47   MONOCYTES ABSOLUTE COUNT 0 64 Thousand/? ??L  0 17-1 22   EOSINOPHILS ABSOLUTE COUNT 0 15 Thousand/? ??L  0 00-0 61   BASOPHILS ABSOLUTE COUNT 0 02 Thousands/? ??L  0 00-0 10     (1) COMPREHENSIVE METABOLIC PANEL [id number] 77:03TZ [facility] Order Number: [id number]     Test Name Result Flag Reference   SODIUM 142 mmol/L  136-145   POTASSIUM 4 2 mmol/L  3 5-5 3   CHLORIDE 106 mmol/L  100-108   CARBON DIOXIDE 28 mmol/L  21-32   ANION GAP (CALC) 8 mmol/L  4-13   BLOOD UREA NITROGEN 34 mg/dL H 5-25   CREATININE 1 32 mg/dL H 0 60-1 30   Standardized to IDMS reference method   CALCIUM 9 0 mg/dL  8 3-10 1   BILI, TOTAL 0 41 mg/dL  0 20-1 00   ALK PHOSPHATAS 94 U/L     ALT (SGPT) 17 U/L  12-78   Specimen collection should occur prior to Sulfasalazine and/or Sulfapyridine administration due to the potential for falsely depressed results     AST(SGOT) 14 U/L  5-45   Specimen collection should occur prior to Sulfasalazine administration due to the potential for falsely depressed results  ALBUMIN 3 4 g/dL L 3 5-5 0   TOTAL PROTEIN 7 1 g/dL  6 4-8 2   eGFR 39 ml/min/1 73sq m     [facility] Disease Education Program recommendations are as follows:  GFR calculation is accurate only with a steady state creatinine  Chronic Kidney disease less than 60 ml/min/1 73 sq  meters  Kidney failure less than 15 ml/min/1 73 sq  meters  GLUCOSE FASTING 98 mg/dL  65-99   Specimen collection should occur prior to Sulfasalazine administration due to the potential for falsely depressed results  Specimen collection should occur prior to Sulfapyridine administration due to the potential for falsely elevated results  (1) LIPID PANEL, FASTING 54EOP6742 08:08AM [employer] Order Number: [id number]     Test Name Result Flag Reference   CHOLESTEROL 180 mg/dL     HDL,DIRECT 50 mg/dL  40-60   Specimen collection should occur prior to Metamizole administration due to the potential for falsley depressed results  LDL CHOLESTEROL CALCULATED 94 mg/dL  0-100   Triglyceride:        Normal <150 mg/dl   Borderline High 150-199 mg/dl   High 200-499 mg/dl   Very High >499 mg/dl      Cholesterol:       Desirable <200 mg/dl    Borderline High 200-239 mg/dl    High >239 mg/dl      HDL Cholesterol:       High>59 mg/dL    Low <41 mg/dL      This screening LDL is a calculated result  It does not have the accuracy of the Direct Measured LDL in the monitoring of patients with hyperlipidemia and/or statin therapy  Direct Measure LDL (OPW078) must be ordered separately in these patients  TRIGLYCERIDES 182 mg/dL H <=150   Specimen collection should occur prior to N-Acetylcysteine or Metamizole administration due to the potential for falsely depressed results       (1) URIC ACID [id number] 08:08AM [employer] Order Number: [id number]     Test Name Result Flag Reference   URIC ACID 11 2 mg/dL H 2 0-6 8 Specimen collection should occur prior to Metamizole administration due to the potential for falsely depressed results  (1) TSH WITH FT4 REFLEX [id number] 08:08AM Suzanna Lopez     Test Name Result Flag Reference   TSH 0 506 uIU/mL  0 358-3 740   Patients undergoing fluorescein dye angiography may retain small amounts of fluorescein in the body for 48-72 hours post procedure  Samples containing fluorescein can produce falsely depressed TSH values  If the patient had this procedure,a specimen should be resubmitted post fluorescein clearance            The recommended reference ranges for TSH during pregnancy are as follows:  First trimester 0 1 to 2 5 uIU/mL  Second trimester  0 2 to 3 0 uIU/mL  Third trimester 0 3 to 3 0 uIU/m

## 2018-01-12 VITALS
DIASTOLIC BLOOD PRESSURE: 62 MMHG | HEIGHT: 60 IN | WEIGHT: 201 LBS | RESPIRATION RATE: 16 BRPM | HEART RATE: 54 BPM | SYSTOLIC BLOOD PRESSURE: 122 MMHG | BODY MASS INDEX: 39.46 KG/M2

## 2018-01-12 VITALS
SYSTOLIC BLOOD PRESSURE: 142 MMHG | OXYGEN SATURATION: 97 % | BODY MASS INDEX: 42.01 KG/M2 | HEART RATE: 58 BPM | RESPIRATION RATE: 14 BRPM | WEIGHT: 214 LBS | DIASTOLIC BLOOD PRESSURE: 62 MMHG | HEIGHT: 60 IN

## 2018-01-12 NOTE — MISCELLANEOUS
Message  Message Free Text Note Form: Called Pt with result CT scan non diagnostic  Still with PM abdominal pain no fever  Sent to ED given renal status and symptoms R/o GI bleed  Dehydration, r/o sepsis ,pancreatitis      Plan    1  (1) AMYLASE; Status:Active; Requested for:91Mum4738;    2  (1) CBC/PLT/DIFF; Status:Active; Requested for:00Qzr6150;    3  (1) COMPREHENSIVE METABOLIC PANEL; Status:Active; Requested for:79Gqo9158;    4  (1) LIPASE; Status:Active;  Requested for:74Cyv8150;     Signatures   Electronically signed by : Olena Joel MD; Aug 20 2016  9:50AM EST                       (Author)

## 2018-01-13 VITALS
SYSTOLIC BLOOD PRESSURE: 145 MMHG | DIASTOLIC BLOOD PRESSURE: 60 MMHG | HEART RATE: 54 BPM | BODY MASS INDEX: 41.91 KG/M2 | HEIGHT: 60 IN | WEIGHT: 213.5 LBS | OXYGEN SATURATION: 92 %

## 2018-01-13 VITALS
HEIGHT: 60 IN | DIASTOLIC BLOOD PRESSURE: 60 MMHG | SYSTOLIC BLOOD PRESSURE: 125 MMHG | BODY MASS INDEX: 41.03 KG/M2 | WEIGHT: 209 LBS | HEART RATE: 70 BPM

## 2018-01-13 VITALS
SYSTOLIC BLOOD PRESSURE: 128 MMHG | BODY MASS INDEX: 41.24 KG/M2 | HEART RATE: 55 BPM | WEIGHT: 210.06 LBS | HEIGHT: 60 IN | DIASTOLIC BLOOD PRESSURE: 60 MMHG | OXYGEN SATURATION: 98 %

## 2018-01-13 VITALS — HEIGHT: 60 IN | BODY MASS INDEX: 41.43 KG/M2 | WEIGHT: 211 LBS

## 2018-01-13 VITALS
BODY MASS INDEX: 39.46 KG/M2 | RESPIRATION RATE: 20 BRPM | TEMPERATURE: 96.8 F | HEART RATE: 80 BPM | DIASTOLIC BLOOD PRESSURE: 62 MMHG | HEIGHT: 60 IN | SYSTOLIC BLOOD PRESSURE: 110 MMHG | WEIGHT: 201 LBS

## 2018-01-13 NOTE — PROGRESS NOTES
Assessment    1  Gout (274 9) (M10 9)   2  Medicare annual wellness visit, subsequent (V70 0) (Z00 00)   3  Need for shingles vaccine (V04 89) (Z23)   4  Need for pneumococcal vaccination (V03 82) (Z23)   5  Hyperlipemia (272 4) (E78 5)   6  Osteoporosis screening (V82 81) (Z13 820)   7  Edema (782 3) (R60 9)   8  Hypothyroidism (244 9) (E03 9)    Plan  Benign essential hypertension, Edema, Gout, Hyperlipemia, Hyperuricemia,  Hypothyroidism    · (1) CBC/PLT/DIFF; Status:Active; Requested for:94Lzy9172;    · (1) COMPREHENSIVE METABOLIC PANEL; Status:Active; Requested for:61Zlb8008;    · (1) LIPID PANEL, FASTING; Status:Active; Requested for:19Hyu7062;    · (1) URIC ACID; Status:Active; Requested for:60Vds3998;    · (Q) TSH, 3RD GENERATION W/REFLEX TO FT4; Status:Active; Requested  for:46Upm2904;    · Follow-up visit in 3 months Evaluation and Treatment  Follow-up  Status: Hold For -  Scheduling  Requested for: 02BLE7461  Gout    · Allopurinol 300 MG Oral Tablet; take one tablet by mouth every day   · Colchicine 0 6 MG Oral Tablet; TAKE 1 TABLET DAILY AS DIRECTED   · Avoid foods and beverages that contain caffeine ; Status:Complete;   Done: 79WVD5330   · Avoid or limit foods that are high in purine ; Status:Complete;   Done: 79IZI9243   · Drink at least 8 glasses of clear liquids a day ; Status:Complete;   Done: 83LVX2590   · Eat a low fat and low cholesterol diet ; Status:Complete;   Done: 57TUN0977   · Limit your use of alcohol to 1 drinks or cans of beer a day ; Status:Complete;   Done:  06DIG0361  Hypertension    · Bumetanide 2 MG Oral Tablet; TAKE 1 TABLET DAILY  Hypothyroidism    · Levothyroxine Sodium 100 MCG Oral Tablet; 1 TAb PO Daily in AM  Need for pneumococcal vaccination    · Prevnar 13 Intramuscular Suspension; as directed; To Be Done: 28DNR1119  Need for shingles vaccine    · Zoster (Zostavax) (Zoster (Zostavax));  INJECT 0 5  ML Subcutaneous  Osteoporosis screening    · * DXA BONE DENSITY SPINE HIP AND PELVIS; Status:Hold For - Scheduling;  Requested for:64Lyi1158;     Discussion/Summary    Acute on chronic gout  Patient will take colchicine for the next 2 weeks, and then switch over to allopurinol  I will recheck her uric acid levels after 3 months  Hypothyroidism with a TSH of 0 5  Patient advised to lower levothyroxine to 100 micrograms today  Elevated triglycerides for which she was advised to continue taking omega-3 fatty acids  Patient declines any other cholesterol lowering medications  Impression: Subsequent Annual Wellness Visit, with preventive exam as well as age and risk appropriate counseling completed  Cardiovascular screening and counseling: lipid panel is due every 6 m year(s)  Diabetes screening and counseling: blood glucose is due every 6 m year(s)  Colorectal cancer screening and counseling: screening not indicated  Breast cancer screening and counseling: screening not indicated  Cervical cancer screening and counseling: screening not indicated  Osteoporosis screening and counseling: the patient declines screening  Abdominal aortic aneurysm screening and counseling: screening not indicated  Glaucoma screening and counseling: screening is current and ophthalmologist referral  Immunizations: influenza vaccine is up to date this year, pneumococcal vaccine due today, the risks and benefits of the Zostavax vaccine were discussed with the patient, Zostavax vaccine needed today and the patient declines the Tdap vaccine  Possible side effects of new medications were reviewed with the patient/guardian today  The treatment plan was reviewed with the patient/guardian  The patient/guardian understands and agrees with the treatment plan      Chief Complaint  AWV      History of Present Illness  HPI: Patient is here for Medicare wellness visit in to review labs  Most recent labs show uric acid of 11 2   Patient is describing an episode of right ankle swelling, with started 1 week ago  Patient stays that she has a history of gout and has been prescribed colchicine and allopurinol in the past by her previous provider  Patient states that she stopped taking allopurinol for some time now  Patient states that she has had 4 episodes of gout in the past 1 year  She is also requesting refill of bumetanide which was prescribed by her previous provider for pedal edema  Patient states that she takes the medication only when her legs are swollen  She is also reporting worsening eczema on her right hand  Patient states that she was prescribed an ointment by her dermatologist that she has been applying on her skin and that is working well for her  Patient will call us back if she needs a refill of the ointment  Hypothyroidism (Follow-Up): The patient reports doing well and TSH 0 5  The patient is currently asymptomatic  Associated symptoms: no depression and no palpitations  Medications include levothyroxine and 125 microgram daily  Medications:  the patient is adherent to her medication regimen, but she denies medication side effects  Hyperlipidemia (Follow-Up): The patient states her hyperlipidemia has been stable since the last visit  Symptoms:   Medications: the patient is adherent with her medication regimen  She denies medication side effects  Medication(s): fish oil  the patient's LDL goal is 130 mg/dL  the patient's last LDL was 94 mg/dL    Gout (Follow-Up): The patient is being seen for follow-up of gout  The patient reports doing poorly and UA 11 2  Interval symptoms:  new onset of swelling, new onset of erythema and new onset of warmth    The patient presents with complaints of sudden onset of constant episodes of moderate right foot new onset of pain, described as aching, non-radiating  Episodes started 1 week ago  Her symptoms are caused by no known event  Symptoms are improved by NSAIDs  Symptoms are made worse by climbing stairs  Symptoms are unchanged  Medications include colchicine  Medications:  the patient is adherent to her medication regimen, but she denies medication side effects  Welcome to [employer] and Wellness Visits: The patient is being seen for the subsequent annual wellness visit  Co-Managers and Medical Equipment/Suppliers: See Patient Care Team   Reviewed Updated [facility]:   Last Medicare Wellness Visit Information was reviewed, patient interviewed, no change since last AWV  Patient Care Team    Care Team Member Role Specialty Office Number   Emily Mattson MD  Family Medicine [phone]) [place] CABALLERO  Orthopedic Surgery [phone]   Mary CABALLERO  Cardiology [phone]     Review of Systems    Constitutional: as noted in HPI  Head and Face: negative  Eyes: negative  ENT: negative  Cardiovascular: negative  Respiratory: negative  Gastrointestinal: negative  Genitourinary: negative  Musculoskeletal: as noted in HPI  Integumentary and Breasts: as noted in HPI  Neurological: negative  Psychiatric: negative  Endocrine: as noted in HPI  Hematologic and Lymphatic: negative  Active Problems    1  Abdominal pain (789 00) (R10 9)   2  Acute bronchitis (466 0) (J20 9)   3  Acute on chronic diastolic congestive heart failure (428 33,428 0) (I50 33)   4  Ankle pain, unspecified laterality   5  Arthropathy of knee (716 96) (M17 10)   6  Atherosclerosis of arteries of extremities (440 20) (I70 209)   7  Benign essential hypertension (401 1) (I10)   8  Breast pain (611 71) (N64 4)   9  Bunion, unspecified laterality   10  Callus (700) (L84)   11  Cellulitis (682 9) (L03 90)   12  Chronic low back pain (724 2,338 29) (M54 5,G89 29)   13  Chronic reflux esophagitis (530 11) (K21 0)   14  Chronic venous insufficiency (459 81) (I87 2)   15  CKD (chronic kidney disease), stage III (585 3) (N18 3)   16  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)   17   Dehydration (276 51) (E86 0)   18  Dermatitis (692 9) (L30 9)   19  Difficulty in walking (719 7) (R26 2)   20  Discoloration of nail (703 8) (L60 8)   21  Diverticulitis of colon (562 11) (K57 32)   22  Dizziness (780 4) (R42)   23  Dyspnea on exertion (786 09) (R06 09)   24  Dystrophic nail (703 8) (L60 3)   25  Dysuria (788 1) (R30 0)   26  Edema (782 3) (R60 9)   27  Electrolyte or fluid disorder (276 9) (E87 8)   28  Elevated triglycerides with high cholesterol (272 2) (E78 2)   29  Encounter for screening for cardiovascular disorders (V81 2) (Z13 6)   30  Encounter for screening mammogram for breast cancer (V76 12) (Z12 31)   31  Esophageal reflux (530 81) (K21 9)   32  Flu vaccine need (V04 81) (Z23)   33  Flu vaccine need (V04 81) (Z23)   34  Gout (274 9) (M10 9)   35  Headache (784 0) (R51)   36  Hiatal hernia (553 3) (K44 9)   37  Hypertension (401 9) (I10)   38  Hyperuricemia (790 6) (E79 0)   39  Hypothyroidism (244 9) (E03 9)   40  Infectious diarrhea (009 2) (A09)   41  Knee pain (719 46) (M25 569)   42  Leg swelling (729 81) (M79 89)   43  Lumbar canal stenosis (724 02) (M48 061)   44  Lumbar radiculopathy (724 4) (M54 16)   45  Lymphedema (457 1) (I89 0)   46  Medicare annual wellness visit, initial (V70 0) (Z00 00)   47  Morbid obesity with body mass index of 40 0-44 9 in adult (278 01,V85 41)    (E66 01,Z68 41)   48  Nails Onychauxis (703 8)   49  Nausea (787 02) (R11 0)   50  Need for prophylactic vaccination and inoculation against influenza (V04 81) (Z23)   51  Nephrolithiasis (592 0) (N20 0)   52  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)   53  Obstructive sleep apnea (327 23) (G47 33)   54  Onychomycosis (110 1) (B35 1)   55  Orthopnea (786 02) (R06 01)   56  Osteoarthritis, localized, knee (715 36) (M17 10)   57  Other specified anxiety disorders (300 09) (F41 8)   58  Other specified inflammatory spondylopathies, 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) (M46 80)   59  Overweight (278 02) (E66 3)   60   Pain in both feet (729 5) (M79 671,M79 672)   61  Pain in wrist joint (719 43) (M25 539)   62  Palpitations (785 1) (R00 2)   63  Pes planus, unspecified laterality (734) (M21 40)   64  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)   65  Pseudogout of left wrist (285 07,378 31) (M11 232)   66  Renal disorder (593 9) (N28 9)   67  Rupture of popliteal cyst (727 51) (M66 0)   68  Screening for colon cancer (V76 51) (Z12 11)   69  Screening for diabetes mellitus (DM) (V77 1) (Z13 1)   70  Screening for genitourinary condition (V81 6) (Z13 89)   71  Screening for malignant neoplasm of breast (V76 10) (Z12 31)   72  Short unilateral neuralgiform headache, conjunctival injection/tearing (339 05) (G44 059)   73  Somnolence, daytime (780 54) (R40 0)   74  Spinal stenosis of lumbar region (724 02) (M48 061)   75  Tarsal tunnel syndrome (355 5) (G57 50)   76  Tenderness in limb (729 5) (M79 609)   77  Tinea pedis (110 4) (B35 3)   78  Umbilical hernia (800 3) (K42 9)   79  Visit for screening mammogram (V76 12) (Z12 31)    Past Medical History    · Benign essential hypertension (401 1) (I10)   · Depression screen (V79 0) (Z13 89)   · Hiatal hernia (553 3) (K44 9)   · History of abdominal pain (V13 89) (N80 464)   · History of abdominal pain (V13 89) (R12 712)   · History of arthritis (V13 4) (Z87 39)   · History of backache (V13 59) (Z87 39)   · History of cataract (V12 49) (Z86 69)   · History of eczema (V13 3) (Z87 2)   · History of heart failure (V12 59) (Z86 79)   · History of hepatitis (V12 09) (Z86 19)   · History of onychomycosis (V12 09) (Z86 19)   · History of sleep apnea (V13 89) (Z86 69)   · History of Orthopnea (786 02) (R06 01)    The active problems and past medical history were reviewed and updated today        Surgical History    · History of Complete Colonoscopy   · History of Hysterectomy   · History of Incisional Hernia Repair - Incarcerated   · History of Knee Surgery    Family History  Mother    · Family history of Coronary artery disease   · Family history of arthritis (V17 7) (Z82 61)   · Denied: Family history of depression   · Family history of diabetes mellitus (V18 0) (Z83 3)   · Family history of hypertension (V17 49) (Z82 49)   · Denied: Family history of substance abuse  Father    · Denied: Family history of depression   · Family history of hypertension (V17 49) (Z82 49)   · Denied: Family history of substance abuse  Sibling    · Denied: Family history of depression   · Denied: Family history of substance abuse  Family History    · Family history of arthritis (V17 7) (Z82 61)    The family history was reviewed and updated today  Social History    · Denied: History of Alcohol Use (History)   · Daily Coffee Consumption (___ Cups/Day)   · Lack of exercise (V69 0) (Z72 3)   ·    · Never a smoker   · Never a smoker   · Sleeps 6 -7 hours a day  The social history was reviewed and updated today  Current Meds   1  Amlodipine-Olmesartan 5-20 MG Oral Tablet; TAKE 1 TABLET ONCE DAILY; Therapy: 74BCZ5234 to (Evaluate:24Nov2017)  Requested for: 39Ult9082; Last   Rx:70Bzt6142 Ordered   2  Aspirin 81 MG Oral Tablet Chewable; Therapy: (Recorded:93Lvr9304) to Recorded   3  Bumetanide 2 MG Oral Tablet; TAKE 1/2 TABLET EVERY DAY; Therapy: 59HTZ9802 to (Evaluate:11Mar2018)  Requested for: 94Rdm8391; Last   Rx:23Aiv2292 Ordered   4  Gabapentin 300 MG Oral Capsule; TAKE 1 CAPSULE AT BEDTIME; Therapy: 21UZT7744 to (Evaluate:13Sep2017)  Requested for: 12EPC4069; Last   Rx:15Jun2017 Ordered   5  Omega-3-acid Ethyl Esters 1 GM Oral Capsule; take 2 capsules by mouth twice a day; Therapy: 95URB3171 to (Evaluate:24Tym2630)  Requested for: 71Hwh4875; Last   Rx:12Sep2017 Ordered   6  Omeprazole 20 MG Oral Capsule Delayed Release; TAKE 1 CAPSULE EVERY DAY AS   DIRECTED  BY  PHYSICIAN; Therapy: 95MTJ4184 to (Ciera Rho)  Requested for: 60TVX2482; Last   Rx:44Chp7815 Ordered   7   Propranolol HCl - 80 MG Oral Tablet; TAKE 1/2 TABLET EVERY 12 HOURS DAILY; Therapy: 11Aug2015 to (Last Rx:71Xfs7593)  Requested for: 52Oql7193 Ordered   8  Spironolactone 25 MG Oral Tablet; TAKE 1 TABLET DAILY; Therapy: 28ZQN4233 to Recorded   9  Vitamin D3 TABS; Take 1 tablet daily; Therapy: (Recorded:02Jun2017) to Recorded    The medication list was reviewed and updated today  Allergies    1  No Known Drug Allergies    Immunizations   ** Printed in Appendix #1 below  Vitals  Signs    Heart Rate: 56  Respiration: 16  Systolic: 607  Diastolic: 60  Height: 5 ft   Weight: 213 lb   BMI Calculated: 41 6  BSA Calculated: 1 92  O2 Saturation: 98    Physical Exam    Constitutional   General appearance: No acute distress, well appearing and well nourished  Ears, Nose, Mouth, and Throat   Otoscopic examination: Tympanic membranes translucent with normal light reflex  Canals patent without erythema  Oropharynx: Normal with no erythema, edema, exudate or lesions  Neck   Neck: Supple, symmetric, trachea midline, no masses  Pulmonary   Respiratory effort: No increased work of breathing or signs of respiratory distress  Auscultation of lungs: Clear to auscultation  Cardiovascular   Auscultation of heart: Normal rate and rhythm, normal S1 and S2, no murmurs  Examination of extremities for edema and/or varicosities: Abnormal   bilateral ankle 1+ pitting edema  Abdomen   Abdomen: Non-tender, no masses  Liver and spleen: No hepatomegaly or splenomegaly  Lymphatic   Palpation of lymph nodes in neck: No lymphadenopathy  Musculoskeletal   Gait and station: Normal   RIGHT ANKLE SWELLING  Stability: Normal     Muscle strength/tone: Normal     Neurologic   Cortical function: Normal mental status  Coordination: Normal finger to nose and heel to shin      Psychiatric   Orientation to person, place, and time: Normal     Mood and affect: Normal        Future Appointments    Date/Time Provider Specialty Site   01/09/2018 03:45 PM Sudeep Rere Hayes DPM Podiatry [facility]   2017 10:15 AM Karly James MD Pain Management [place] [address]   Electronically signed by : Bradley Munoz MD; Nov 15 2017  1:48PM EST                       (Author)    Appendix #1     Patient: Anjelica Mcclendon ; : 1940; MRN: [id number]      2 2 3 4 5 6    Influenza  25-Oct-2001 28-Oct-2005 10-Oct-2006 12-Oct-2007 14-Oct-2008 17-Sep-2010    PPSV  10-Oct-2006

## 2018-01-13 NOTE — RESULT NOTES
Verified Results  * XR WRIST 3+ VIEW LEFT 63QPD6338 10:13AM Kayleen Meng Order Number: [id number]     Test Name Result Flag Reference   XR WRIST 3+ VW LEFT (Report)     LEFT WRIST     INDICATION: Left wrist pain  No trauma  COMPARISON: None     VIEWS: PA, lateral, and oblique     IMAGES: 3     FINDINGS:     There is no acute fracture or dislocation  Chondrocalcinosis is present between the distal radius ,the distal ulna and the proximal carpal row  Degenerative changes are seen between the greater multangular and the base of the 1st metacarpal      No lytic or blastic lesions are seen  Soft tissues are unremarkable  IMPRESSION:     No acute osseous abnormality  Degenerative changes         Workstation performed: [id number]     Signed by:   Carlota Yu MD   6/23/17

## 2018-01-13 NOTE — RESULT NOTES
Verified Results  (1) BASIC METABOLIC PROFILE 02VPQ3293 10:16AM Rashmi Larrabee Order Number: [id number]     Test Name Result Flag Reference   SODIUM 140 mmol/L  136-145   POTASSIUM 4 5 mmol/L  3 5-5 3   CHLORIDE 104 mmol/L  100-108   CARBON DIOXIDE 29 mmol/L  21-32   ANION GAP (CALC) 7 mmol/L  4-13   BLOOD UREA NITROGEN 26 mg/dL H 5-25   CREATININE 1 29 mg/dL  0 60-1 30   Standardized to IDMS reference method   CALCIUM 9 6 mg/dL  8 3-10 1   eGFR Non-African American 40 1 ml/min/1 73sq [facility] Disease Education Program recommendations are as follows:  GFR calculation is accurate only with a steady state creatinine  Chronic Kidney disease less than 60 ml/min/1 73 sq  meters  Kidney failure less than 15 ml/min/1 73 sq  meters  GLUCOSE FASTING 93 mg/dL  65-99     (1) URIC ACID 23Jun2017 10:16AM Don Meng Order Number: [id number]     Test Name Result Flag Reference   URIC ACID 9 5 mg/dL H 2 0-6 8   Specimen collection should occur prior to Metamizole administration due to the potential for falsely depressed results

## 2018-01-14 VITALS
HEART RATE: 72 BPM | BODY MASS INDEX: 40.64 KG/M2 | DIASTOLIC BLOOD PRESSURE: 80 MMHG | WEIGHT: 207 LBS | SYSTOLIC BLOOD PRESSURE: 130 MMHG | TEMPERATURE: 97.2 F | HEIGHT: 60 IN | RESPIRATION RATE: 16 BRPM

## 2018-01-14 VITALS
HEIGHT: 60 IN | WEIGHT: 213 LBS | DIASTOLIC BLOOD PRESSURE: 60 MMHG | HEART RATE: 56 BPM | RESPIRATION RATE: 16 BRPM | SYSTOLIC BLOOD PRESSURE: 138 MMHG | BODY MASS INDEX: 41.82 KG/M2 | OXYGEN SATURATION: 98 %

## 2018-01-14 VITALS
WEIGHT: 209 LBS | BODY MASS INDEX: 41.03 KG/M2 | HEART RATE: 70 BPM | SYSTOLIC BLOOD PRESSURE: 125 MMHG | HEIGHT: 60 IN | RESPIRATION RATE: 16 BRPM | DIASTOLIC BLOOD PRESSURE: 60 MMHG

## 2018-01-14 VITALS
DIASTOLIC BLOOD PRESSURE: 58 MMHG | HEIGHT: 60 IN | WEIGHT: 208 LBS | BODY MASS INDEX: 40.84 KG/M2 | HEART RATE: 78 BPM | SYSTOLIC BLOOD PRESSURE: 100 MMHG | TEMPERATURE: 98 F | RESPIRATION RATE: 16 BRPM

## 2018-01-14 VITALS
HEIGHT: 60 IN | DIASTOLIC BLOOD PRESSURE: 81 MMHG | HEART RATE: 68 BPM | SYSTOLIC BLOOD PRESSURE: 132 MMHG | RESPIRATION RATE: 17 BRPM | WEIGHT: 201 LBS | BODY MASS INDEX: 39.46 KG/M2

## 2018-01-15 VITALS
HEIGHT: 60 IN | BODY MASS INDEX: 42.01 KG/M2 | DIASTOLIC BLOOD PRESSURE: 66 MMHG | WEIGHT: 214 LBS | SYSTOLIC BLOOD PRESSURE: 143 MMHG

## 2018-01-15 VITALS
DIASTOLIC BLOOD PRESSURE: 58 MMHG | WEIGHT: 211 LBS | BODY MASS INDEX: 41.43 KG/M2 | HEIGHT: 60 IN | SYSTOLIC BLOOD PRESSURE: 120 MMHG

## 2018-01-15 DIAGNOSIS — I70.209 ATHEROSCLEROSIS OF NATIVE ARTERY OF EXTREMITY (HCC): ICD-10-CM

## 2018-01-15 DIAGNOSIS — N18.30 CHRONIC KIDNEY DISEASE, STAGE III (MODERATE) (HCC): ICD-10-CM

## 2018-01-15 NOTE — RESULT NOTES
Message   will review at appt today  Verified Results  (1) URINE PROTEIN CREATININE RATIO 84STQ1624 08:08AM [employer]     Test Name Result Flag Reference   CREATININE URINE 108 0 mg/dL     URINE PROTEIN:CREATININE RATIO 0 14 H 0 00-0 10   URINE PROTEIN 15 mg/dL       (1) PTH N-TERMINAL (INTACT) 54JHX6015 08:08AM [employer]     Test Name Result Flag Reference   PARATHYROID HORMONE INTACT 103 9 pg/mL H 14 0-72 0     (1) VITAMIN D 25-HYDROXY 40MGY2913 08:08AM Maple [facility]ensen     Test Name Result Flag Reference   VIT D 25-HYDROX 40 5 ng/mL  30 0-100 0   This assay is a certified procedure of the CDC Vitamin D Standardization Certification Program (VDSCP)     Deficiency <20ng/ml   Insufficiency 20-30ng/ml   Sufficient  ng/ml     *Patients undergoing fluorescein dye angiography may retain small amounts of fluorescein in the body for 48-72 hours post procedure  Samples containing fluorescein can produce falsely elevated Vitamin D values  If the patient had this procedure, a specimen should be resubmitted post fluorescein clearance

## 2018-01-16 NOTE — PROGRESS NOTES
Assessment    1  Callus (700) (L84)   2  Atherosclerosis of arteries of extremities (440 20) (I70 209)   3  Pain in both feet (729 5) (M79 671,M79 672)    Plan    · Follow-up visit in 9 weeks Evaluation and Treatment  Follow-up  Status: Hold For -  Scheduling  Requested for: 56RJI3025   · There are many things you can do at home to ensure good foot health ; Status:Complete;    Done: 55RBF1221 11:34AM   · Wear shoes that give your toes plenty of room ; Status:Complete;   Done: 11WGY3504  11:34AM    Discussion/Summary  The treatment plan was reviewed with the patient/guardian  The patient/guardian understands and agrees with the treatment plan   The patient was counseled regarding instructions for management, patient and family education, importance of compliance with treatment  Chief Complaint  feet care      History of Present Illness  HPI: Asian complains of pain in her feet with ambulation  She is pain around the big toe joints  There is no history of trauma  Today the patient is concerned with her swollen legs  She does not have pain in her legs, however, they are itchy and red  She suffers from edema  She does take water pill  She also has a history of abdominal cellulitis and the need for antibiotic  Review of Systems    Constitutional: No fever, no chills, feels well, no tiredness, no recent weight gain or loss  Eyes: No complaints of eyesight problems, no red eyes  ENT: no loss of hearing, no nosebleeds, no sore throat  Cardiovascular: No complaints of chest pain, no palpitations, no leg claudication or lower extremity edema  Respiratory: no compliants of shortness of breath, no wheezing, no cough  Gastrointestinal: no complaints of abdominal pain, no constipation, no nausea or diarrhea, no vomiting, no bloody stools  Genitourinary: no complaints of dysuria, no incontinence  Musculoskeletal: arthralgias, limb pain and myalgias, but as noted in HPI     Integumentary: no complaints of skin rash or lesion, no itching or dry skin, no skin wounds  Neurological: numbness and tingling, but no complaints of headache, no confusion, no numbness or tingling, no dizziness and as noted in HPI  Endocrine: No complaints of muscle weakness, no feelings of weakness, no frequent urination, no excessive thirst and as noted in HPI  feelings of weakness   Psychiatric: No suicidal thoughts, no anxiety, no feelings of depression  Active Problems    1  Abdominal pain (789 00) (R10 9)   2  Acute bronchitis (466 0) (J20 9)   3  Ankle pain, unspecified laterality   4  Arthropathy of knee (716 96) (M12 9)   5  Atherosclerosis of arteries of extremities (440 20) (I70 209)   6  Benign essential hypertension (401 1) (I10)   7  Breast pain (611 71) (N64 4)   8  Bunion, unspecified laterality   9  Callus (700) (L84)   10  Cellulitis (682 9) (L03 90)   11  Chronic reflux esophagitis (530 11) (K21 0)   12  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)   13  Dehydration (276 51) (E86 0)   14  Dermatitis (692 9) (L30 9)   15  Difficulty in walking (719 7) (R26 2)   16  Discoloration of nail (703 8) (L60 8)   17  Diverticulitis of colon (562 11) (K57 32)   18  Dizziness (780 4) (R42)   19  Dyspnea on exertion (786 09) (R06 09)   20  Dystrophic nail (703 8) (L60 3)   21  Dysuria (788 1) (R30 0)   22  Edema (782 3) (R60 9)   23  Electrolyte or fluid disorder (276 9) (E87 8)   24  Encounter for screening for cardiovascular disorders (V81 2) (Z13 6)   25  Esophageal reflux (530 81) (K21 9)   26  Flu vaccine need (V04 81) (Z23)   27  Flu vaccine need (V04 81) (Z23)   28  Gout (274 9) (M10 9)   29  Headache (784 0) (R51)   30  Hiatal hernia (553 3) (K44 9)   31  Hypertension (401 9) (I10)   32  Hypothyroidism (244 9) (E03 9)   33  Infectious diarrhea (009 2) (A09)   34  Knee pain (719 46) (M25 569)   35  Leg swelling (729 81) (M79 89)   36  Lumbar canal stenosis (724 02) (M48 06)   37   Lumbar radiculopathy (724 4) (M54 16)   38  Medicare annual wellness visit, initial (V70 0) (Z00 00)   39  Morbid obesity with body mass index of 40 0-44 9 in adult (278 01,V85 41)    (E66 01,Z68 41)   40  Nails Onychauxis (703 8)   41  Nausea (787 02) (R11 0)   42  Need for prophylactic vaccination and inoculation against influenza (V04 81) (Z23)   43  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)   44  Obstructive sleep apnea (327 23) (G47 33)   45  Onychomycosis (110 1) (B35 1)   46  Orthopnea (786 02) (R06 01)   47  Osteoarthritis, localized, knee (715 36) (M17 9)   48  Other specified anxiety disorders (300 09) (F41 8)   49  Other specified inflammatory spondylopathies, 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) (M46 80)   50  Overweight (278 02) (E66 3)   51  Pain in both feet (729 5) (M79 671,M79 672)   52  Palpitations (785 1) (R00 2)   53  Pes planus, unspecified laterality (734) (M21 40)   54  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)   55  Renal disorder (593 9) (N28 9)   56  Rupture of popliteal cyst (727 51) (M66 0)   57  Screening for colon cancer (V76 51) (Z12 11)   58  Screening for diabetes mellitus (DM) (V77 1) (Z13 1)   59  Screening for malignant neoplasm of breast (V76 10) (Z12 39)   60  Short unilateral neuralgiform headache, conjunctival injection/tearing (339 05) (G44 059)   61  Somnolence, daytime (780 54) (R40 0)   62  Tarsal tunnel syndrome (355 5) (G57 50)   63  Tenderness in limb (729 5) (M79 609)   64  Tinea pedis (110 4) (B35 3)   65  Umbilical hernia (330 2) (K42 9)   66   Visit for screening mammogram (V76 12) (Z12 31)    Past Medical History    · Depression screen (V79 0) (Z13 89)   · History of abdominal pain (V13 89) (V97 015)   · History of abdominal pain (V13 89) (F73 391)   · History of backache (V13 59) (Z87 39)   · History of onychomycosis (V12 09) (Z86 19)   · History of Orthopnea (786 02) (R06 01)   · Screening for genitourinary condition (V81 6) (Z13 89)    The active problems and past medical history were reviewed and updated today  Surgical History    · History of Hysterectomy   · History of Incisional Hernia Repair - Incarcerated   · History of Knee Surgery    The surgical history was reviewed and updated today  Family History  Family History    · Family history of arthritis (V17 7) (Z82 61)    The family history was reviewed and updated today  Social History    · Denied: History of Alcohol Use (History)   · Daily Coffee Consumption (___ Cups/Day)   · Never a smoker  The social history was reviewed and updated today  The social history was reviewed and is unchanged  Current Meds   1  Allopurinol 300 MG Oral Tablet; 1 every day; Therapy: 04QQM6607 to (Last RP:49RQS4311)  Requested for: 39HOQ5014 Ordered   2  Aspirin 81 MG Oral Tablet Chewable; Therapy: (Recorded:10Mar2016) to Recorded   3  Levothyroxine Sodium 125 MCG Oral Tablet; TAKE ONE TABLET BY MOUTH ONCE   DAILY  Requested for: 27NBT7141; Last Rx:09Mai4962 Ordered   4  Metamucil 0 52 GM Oral Capsule; TAKE 1 CAPSULE TWICE DAILY; Therapy: 46SRS8679 to Recorded   5  Omeprazole 20 MG Oral Capsule Delayed Release; TAKE 1 CAPSULE EVERY DAY AS   DIRECTED  BY  PHYSICIAN; Therapy: 99YYB2659 to (Evaluate:14Jun2017)  Requested for: 30VZI4290; Last   Rx:23Itu8547 Ordered   6  Potassium Chloride Savana ER 20 MEQ Oral Tablet Extended Release; DISSOLVE 1   TABLET IN 4-6 OZ OF  LIQUID AND DRINK ONCE DAILY; Therapy: 59Duq9884 to (Evaluate:18Jun2016)  Requested for: 67Cfv0984; Last   Rx:11Ydr5542 Ordered   7  Probiata Oral Tablet; USE AS DIRECTED; Therapy: 65FPC8017 to Recorded   8  Propranolol HCl - 80 MG Oral Tablet; TAKE 1 TABLET EVERY 12 HOURS DAILY; Therapy: 91Sqq0182 to (Last Rx:23Xry1743)  Requested for: 30QRF7221 Ordered   9  Red Yeast Rice 600 MG Oral Tablet; 1 Every Day; Therapy: 49OTD7985 to  Requested for: 22PZN5363 Recorded   10  Spironolactone 25 MG Oral Tablet; 1 every day;     Therapy: 79NHZ0474 to (Last Rx:01Ckv3804)  Requested for: 32BLC1731 Ordered   11  Tribenzor 40-10-12 5 MG Oral Tablet; TAKE 1 TABLET EVERY DAY IN THE MORNING; Therapy: 18GUD8435 to (Keith Friedman)  Requested for: 82LZN4962; Last    Rx:10Nov2016 Ordered   12  Welchol 625 MG Oral Tablet; once daily; Therapy: 78BER1416 to Recorded   13  Zolpidem Tartrate 5 MG Oral Tablet; Therapy: 04RCZ5788 to Recorded    The medication list was reviewed and updated today  Allergies    1  No Known Drug Allergies    Vitals   Recorded: 56CZG4302 23:23SY   Systolic 436   Diastolic 69   Heart Rate 81   Respiration 17   Height 5 ft    Weight 209 lb    BMI Calculated 40 82   BSA Calculated 1 9     Physical Exam  Left Foot: Appearance: Normal except as noted: excessive pronation and pes planus  Right Foot: Appearance: Normal except as noted: excessive pronation and pes planus  Tenderness: None except the calcaneous, medial calcaneous and insertion of the plantar fascia  Left Ankle: Appearance: Normal except ecchymosis and swelling laterally  ROM: limited ROM in all planes   Right Ankle: ROM: limited ROM in all planes Motor: diffuse weakness  Neurological Exam: performed  Light touch was intact bilaterally  Vibratory sensation was intact bilaterally  Response to monofilament test was intact bilaterally  Deep tendon reflexes: patellar reflex present bilaterally and achilles reflex present bilaterally  Vascular Exam: performed Dorsalis pedis pulses were 1/4 bilaterally  Posterior tibial pulses were 1/4 bilaterally  Elevation Pallor: diminished bilaterally  Capillary refill time was greater than 3 seconds bilaterally and Q9 findings bilateral  Negative digital hair noted  Positive abnormal cooling bilateral  Edema: moderate bilaterally and 6/7 pitting edema  Negative Homans sign  Toenails: All of the toenails were elongated, hypertrophied, discolored and Mycotic with onychogryphosis  Note is made of bilateral tinea pedis in moccasin foot  Distribution       Socks and shoes removed, the Right Foot: the foot was swollen, erythematous and dry  The sensory exam showed diminished vibratory sensation at the level of the toes  Diminished tactile sensation with monofilament testing throughout the right foot  Socks and shoes removed, Left Foot: the foot was swollen, erythematous and dry  The sensory exam showed diminished vibratory sensation at the level of the toes  Diminished tactile sensation with monofilament testing throughout the left foot  Capillary refills findings on the right were delayed in the toes  Pulses:   1+ in the posterior tibialis on the right   1+ in the dorsalis pedis on the right  Capillary refills findings on the left were delayed in the toes  Pulses:   1+ in the posterior tibialis on the left   1+ in the dorsalis pedis on the left  Assign Risk Category: 2: Loss of protective sensation with or without weakness, deformity, callus, pre-ulcer, or history of ulceration  High risk  Hyperkeratosis: present on both first toes, present on both first sub metatarsals and Bilateral plantar moccasin tinea pedis noted  Shoe Gear Evaluation: performed ()  Recommendation(s): SAS style  Procedure  All mycotic nails debrided today  Bilateral pre-ulcerative lesions debrided today  Procedures performed without pain or complication      Attending Note  Attending Note [facility]: Key Parts of the Exam: Patient has continued leg pain  This is due to radiculopathy  Patient is taking Avapro 100 mg 3 times a day  We will increase it to 300 mg twice a day        Future Appointments    Date/Time Provider Specialty Site   12/13/2016 12:30 PM Alessandra Crowe MD [facility] DEPT  OF [facility]     Signatures   Electronically signed by : Taina Douglas DPM; Nov 18 2016 11:35AM EST                       (Author)

## 2018-01-16 NOTE — RESULT NOTES
Verified Results  (1) NT-BNP (PRO BRAIN NATRIURETIC PEPTIDE) 56FVX0854 01:43PM Naz Meng [facility]   NT-ProBNP:  Age: <75   <125 pg/mL       >=75  <450 pg/mL     Test Name Result Flag Reference   NT-PRO  pg/mL H <450

## 2018-01-17 NOTE — RESULT NOTES
Verified Results  (1) AMYLASE 63BEN2232 09:31AM Valerie Meng [facility] Order Number: [id number]     Test Name Result Flag Reference   AMYLASE 31 IU/L       (1) COMPREHENSIVE METABOLIC PANEL 34VGG3308 32:47HT Valerie Meng [employer] Order Number: [id number]     Test Name Result Flag Reference   GLUCOSE,RANDM 90 mg/dL     If the patient is fasting, the ADA then defines impaired fasting glucose as > 100 mg/dL and diabetes as > or equal to 123 mg/dL  SODIUM 140 mmol/L  136-145   POTASSIUM 3 9 mmol/L  3 5-5 3   CHLORIDE 107 mmol/L  100-108   CARBON DIOXIDE 27 mmol/L  21-32   ANION GAP (CALC) 6 mmol/L  4-13   BLOOD UREA NITROGEN 17 mg/dL  5-25   CREATININE 1 15 mg/dL  0 60-1 30   Standardized to IDMS reference method   CALCIUM 8 9 mg/dL  8 3-10 1   BILI, TOTAL 0 40 mg/dL  0 20-1 00   ALK PHOSPHATAS 127 U/L H    ALT (SGPT) 18 U/L  12-78   AST(SGOT) 11 U/L  5-45   ALBUMIN 3 3 g/dL L 3 5-5 0   TOTAL PROTEIN 6 6 g/dL  6 4-8 2   eGFR Non-African American 45 9 ml/min/1 73sq [facility] Disease Education Program recommendations are as follows:  GFR calculation is accurate only with a steady state creatinine  Chronic Kidney disease less than 60 ml/min/1 73 sq  meters  Kidney failure less than 15 ml/min/1 73 sq  meters  (1) GIARDIA LAMBLIA 94Snq0366 11:44AM Delmonico, Deberah Cockayne     Test Name Result Flag Reference   GIARDIA LAMBLIA      The specimen submitted does not meet the laboratory's criteria for  acceptability  Refer to [facility] for specimen  acceptability criteria  Performed at:  [address] [place] [address]  [id number]  : Elly Vazquez MD, Phone:  [phone]     (1) C  DIFFICILE TOXIN BY PCR 05ICR7079 11:44AM Delmonico, Deberah Cockayne     Test Name Result Flag Reference   C  DIFFICILE TOXIN BY PCR   NEGATIVE for C difficle toxin by PCR  NEGATIVE for C difficle toxin by PCR

## 2018-01-18 NOTE — RESULT NOTES
Verified Results  [facility] [place] [id number] 08:31AM Reji Fong   TW Order Number: [id number]   Performing Comments: CKD/SHABANA  please eval renal tissue    - Patient Instructions: To schedule this appointment, please contact Central Scheduling at [phone]  Test Name Result Flag Reference   US KIDNEY AND BLADDER (Report)     RENAL ULTRASOUND     INDICATION: Chronic kidney disease  COMPARISON: None  TECHNIQUE:  Ultrasound of the retroperitoneum was performed with a curvilinear transducer utilizing volumetric sweeps and still imaging techniques  FINDINGS:     KIDNEYS:   Symmetric and slightly diminished in size  Right kidney: 10 3 x 5 0 cm  The renal cortex measures 1 cm  Normal echogenicity and contour  No suspicious masses detected  No hydronephrosis  In the lower pole of the right kidney a nonobstructing cortical calculus measures 6 mm  No perinephric fluid collections  Left kidney: 10 4 x 5 7 cm  The renal cortex measures 1 1 cm  Normal echogenicity and contour  No suspicious masses detected  No hydronephrosis  No shadowing calculi  No perinephric fluid collections  URETERS:   Nonvisualized  BLADDER:    Normally distended  No focal thickening or mass lesions  Bilateral ureteral jets not demonstrated  IMPRESSION:     Nonobstructing right renal calculus  Both kidneys are slightly reduced in size  Workstation performed: [id number]     Signed by:   Dhruv Valle MD   6/16/17     (1) Jcarlos 57YKM9389 12:05PM Reji LARIOS Order Number: [id number]     Test Name Result Flag Reference   GLUCOSE,RANDM 100 mg/dL     If the patient is fasting, the ADA then defines impaired fasting glucose as > 100 mg/dL and diabetes as > or equal to 123 mg/dL     SODIUM 142 mmol/L  136-145   POTASSIUM 4 3 mmol/L  3 5-5 3   CHLORIDE 107 mmol/L  100-108   CARBON DIOXIDE 29 mmol/L  21-32   ANION GAP (CALC) 6 mmol/L  4-13   BLOOD UREA NITROGEN 30 mg/dL H 5-25   CREATININE 1 46 mg/dL H 0 60-1 30   Standardized to IDMS reference method   CALCIUM 8 9 mg/dL  8 3-10 1   eGFR Non-African American 34 7 ml/min/1 73sq [facility] Disease Education Program recommendations are as follows:  GFR calculation is accurate only with a steady state creatinine  Chronic Kidney disease less than 60 ml/min/1 73 sq  meters  Kidney failure less than 15 ml/min/1 73 sq  meters       (1) CBC/ PLT (NO DIFF) 47UIA3660 12:05PM [facility] Order Number: [id number]     Test Name Result Flag Reference   HEMATOCRIT 37 8 %  34 8-46 1   HEMOGLOBIN 11 9 g/dL  11 5-15 4   MCHC 31 5 g/dL  31 4-37 4   MCH 28 7 pg  26 8-34 3   MCV 91 fL  82-98   PLATELET COUNT 830 Thousands/uL  149-390   RBC COUNT 4 15 Million/uL  3 81-5 12   RDW 13 9 %  11 6-15 1   WBC COUNT 8 03 Thousand/uL  4 31-10 16   MPV 11 7 fL  8 9-12 7     (1) URINALYSIS w URINE C/S REFLEX (will reflex a microscopy if leukocytes, occult blood, or nitrites are not within normal limits) 31ESE4414 12:05PM [facility] Order Number: [id number]     Test Name Result Flag Reference   COLOR Yellow     CLARITY Clear     PH UA 6 0  4 5-8 0   LEUKOCYTE ESTERASE UA Moderate A Negative   NITRITE UA Negative  Negative   PROTEIN UA Negative mg/dl  Negative   GLUCOSE UA Negative mg/dl  Negative   KETONES UA Negative mg/dl  Negative   UROBILINOGEN UA 0 2 E U /dl  0 2, 1 0 E U /dl   BILIRUBIN UA Negative  Negative   BLOOD UA Negative  Negative   SPECIFIC GRAVITY UA 1 010  1 003-1 030   BACTERIA None Seen /hpf  None Seen, Occasional   EPITHELIAL CELLS None Seen /hpf  None Seen, Occasional   HYALINE CASTS None Seen /lpf  None Seen   RBC UA None Seen /hpf  None Seen   WBC UA 10-20 /hpf A None Seen     (1) URINE PROTEIN CREATININE RATIO 13Jun2017 12:05PM [facility] Order Number: [id number]     Test Name Result Flag Reference   CREATININE URINE 46 1 mg/dL     URINE PROTEIN:CREATININE RATIO <0 13 H 0 00-0 10   URINE PROTEIN <6 mg/dL       (1) C3 COMPLEMENT 13Jun2017 12:05PM [facility] Order Number: [id number]     Test Name Result Flag Reference   C3 COMPLEMENT 142 0 mg/dL  90 0-180 0     (1) C4 COMPLEMENT 13Jun2017 12:05PM [facility] Number: [id number]     Test Name Result Flag Reference   C4 COMPLEMENT 38 0 mg/dL  10 0-40 0     (1) URINE MICROSCOPIC 13Jun2017 12:05PM [facility] Order Number: [id number]     Test Name Result Flag Reference   CLINICAL REPORT (Report)     Test:        Urine culture  Specimen Type:   Urine  Specimen Date:   6/13/2017 12:05 PM  Result Date:    6/14/2017 9:21 PM  Result Status:   Final result  Resulting Lab:   [place] [address]            Tel: [phone]      CULTURE                                       ------------------                                   10,000-19,000 cfu/ml Mixed Contaminants X3

## 2018-01-22 VITALS
BODY MASS INDEX: 42.21 KG/M2 | HEIGHT: 60 IN | WEIGHT: 215 LBS | DIASTOLIC BLOOD PRESSURE: 60 MMHG | SYSTOLIC BLOOD PRESSURE: 138 MMHG

## 2018-01-23 VITALS
BODY MASS INDEX: 42.41 KG/M2 | DIASTOLIC BLOOD PRESSURE: 72 MMHG | TEMPERATURE: 96.9 F | SYSTOLIC BLOOD PRESSURE: 132 MMHG | HEART RATE: 68 BPM | RESPIRATION RATE: 16 BRPM | WEIGHT: 216 LBS | HEIGHT: 60 IN

## 2018-01-23 VITALS
BODY MASS INDEX: 41.08 KG/M2 | HEIGHT: 60 IN | DIASTOLIC BLOOD PRESSURE: 69 MMHG | HEART RATE: 60 BPM | SYSTOLIC BLOOD PRESSURE: 126 MMHG | WEIGHT: 209.25 LBS

## 2018-01-23 NOTE — RESULT NOTES
Message   Recorded as Task   Date: 12/19/2017 08:09 AM, Created By: Luis Wood   Task Name: Follow Up   Assigned To: Alonzo lowe,Team   Regarding Patient: Kylie Sheets, Status: Active   CommentDorean Dayannaers - 19 Dec 1981 8:58 AM     TASK CREATED  S/P R L4-5 TFESI ON 12/12/2017 W/ DR Deandre Gutierrez - NO F/U SCHEDULED   Rosetta Lebron - 19 Dec 2017 10:29 AM     TASK EDITED  Spoke with pt, 50% relief and current pain level is a 5/10  Explained to the pt it could take up to 2weeks to get the full effect of the steroid  pt verbalized understanding  Ana Rangel - 79 Dec 2017 10:36 AM     TASK REPLIED TO: Previously Assigned To Ana Rangel md aware   f/u PRN        Signatures   Electronically signed by : Joselyn Oliva, ; Dec 19 2017  2:07PM EST                       (Author)

## 2018-01-29 ENCOUNTER — HOSPITAL ENCOUNTER (EMERGENCY)
Facility: HOSPITAL | Age: 78
Discharge: HOME | End: 2018-01-29
Attending: EMERGENCY MEDICINE | Admitting: EMERGENCY MEDICINE
Payer: MEDICARE

## 2018-01-29 ENCOUNTER — APPOINTMENT (EMERGENCY)
Dept: RADIOLOGY | Facility: HOSPITAL | Age: 78
End: 2018-01-29
Payer: MEDICARE

## 2018-01-29 VITALS
RESPIRATION RATE: 16 BRPM | BODY MASS INDEX: 41.01 KG/M2 | OXYGEN SATURATION: 98 % | HEART RATE: 90 BPM | WEIGHT: 210 LBS | DIASTOLIC BLOOD PRESSURE: 107 MMHG | TEMPERATURE: 98.4 F | SYSTOLIC BLOOD PRESSURE: 182 MMHG

## 2018-01-29 DIAGNOSIS — M25.521 RIGHT ELBOW PAIN: Primary | ICD-10-CM

## 2018-01-29 PROCEDURE — 73080 X-RAY EXAM OF ELBOW: CPT

## 2018-01-29 PROCEDURE — 99283 EMERGENCY DEPT VISIT LOW MDM: CPT

## 2018-01-29 RX ORDER — MORPHINE SULFATE 30 MG/1
15 TABLET ORAL EVERY 6 HOURS PRN
Qty: 10 TABLET | Refills: 0 | Status: SHIPPED | OUTPATIENT
Start: 2018-01-29 | End: 2018-02-01

## 2018-01-29 RX ORDER — OXYCODONE HYDROCHLORIDE AND ACETAMINOPHEN 5; 325 MG/1; MG/1
1 TABLET ORAL ONCE
Status: COMPLETED | OUTPATIENT
Start: 2018-01-29 | End: 2018-01-29

## 2018-01-29 RX ADMIN — OXYCODONE HYDROCHLORIDE AND ACETAMINOPHEN 1 TABLET: 5; 325 TABLET ORAL at 09:19

## 2018-01-29 NOTE — ED PROVIDER NOTES
History  Chief Complaint   Patient presents with    Elbow Pain     right elbow pain , no know injury     Patient presents for evaluation of right elbow pain  States getting worse over the last 3 days  Taking tylenol without improvement  Denies any fall or trauma  States she did pull something with it  No fever or chills  History provided by:  Patient   used: No    Elbow Pain       Prior to Admission Medications   Prescriptions Last Dose Informant Patient Reported? Taking? Cholecalciferol (VITAMIN D-3 PO)   Yes No   Sig: Take by mouth every morning   Olmesartan-Amlodipine-HCTZ (TRIBENZOR) 40-10-12 5 MG TABS   Yes No   Sig: Take by mouth every morning     allopurinol (ZYLOPRIM) 300 mg tablet   Yes No   Sig: Take 300 mg by mouth every morning     aspirin 81 MG tablet   Yes No   Sig: Take 81 mg by mouth every morning     bumetanide (BUMEX) 2 mg tablet   Yes No   Sig: Take 2 mg by mouth every morning   clobetasol (TEMOVATE) 0 05 % ointment   Yes No   Sig: Apply 1 application topically 2 (two) times a day    gabapentin (NEURONTIN) 300 mg capsule   Yes No   Sig: Take 300 mg by mouth 2 (two) times a day   ketorolac (ACULAR) 0 5 % ophthalmic solution   No No   Sig: Administer 1 drop to the right eye 4 (four) times a day for 30 days   levothyroxine 125 mcg tablet   Yes No   Sig: Take 125 mcg by mouth every morning     omeprazole (PriLOSEC) 20 mg delayed release capsule   Yes No   Sig: Take 20 mg by mouth every morning     propranolol (INDERAL) 80 mg tablet   Yes No   Sig: Take 80 mg by mouth 2 (two) times a day        Facility-Administered Medications: None       Past Medical History:   Diagnosis Date    Arthritis     joints    Disease of thyroid gland     GERD (gastroesophageal reflux disease)     Gout     Hypertension     Sleep apnea     wears CPAP       Past Surgical History:   Procedure Laterality Date    ABDOMINAL SURGERY          BREAST SURGERY Right     cyst removal    CARPAL TUNNEL RELEASE Left     CARPAL TUNNEL RELEASE Right     CATARACT EXTRACTION       SECTION  1960    x1    DILATION AND CURETTAGE OF UTERUS  1967    EYE SURGERY Left 2006    cataracts    HERNIA REPAIR      umbilical hernia    KNEE ARTHROSCOPY Right     [place] REMV CATARACT EXTRACAP,INSERT LENS Right 3/27/2017    Procedure: EXTRACTION EXTRACAPSULAR CATARACT PHACO INTRAOCULAR LENS (IOL); Surgeon: Crarington Kumar MD;  Location: [facility] MAIN OR;  Service: Ophthalmology       Family History   Problem Relation Age of Onset    Diabetes Mother     Hypertension Father     Diabetes Brother     Diabetes Son      I have reviewed and agree with the history as documented  Social History   Substance Use Topics    Smoking status: Never Smoker    Smokeless tobacco: Never Used    Alcohol use No        Review of Systems   Musculoskeletal: Positive for arthralgias  All other systems reviewed and are negative  Physical Exam  ED Triage Vitals [18 0802]   Temperature Pulse Respirations Blood Pressure SpO2   98 4 °F (36 9 °C) 90 16 (!) 182/107 98 %      Temp Source Heart Rate Source Patient Position - Orthostatic VS BP Location FiO2 (%)   Oral Monitor Sitting Right arm --      Pain Score       8           Orthostatic Vital Signs  Vitals:    18 0802   BP: (!) 182/107   Pulse: 90   Patient Position - Orthostatic VS: Sitting       Physical Exam   Constitutional: No distress  Cardiovascular: Normal rate, regular rhythm and intact distal pulses  Pulmonary/Chest: Effort normal and breath sounds normal  No respiratory distress  Musculoskeletal: She exhibits tenderness  She exhibits no deformity  Arms:  Neurological: She is alert  Skin: Capillary refill takes less than 2 seconds  She is not diaphoretic  Nursing note and vitals reviewed        ED Medications  Medications - No data to display    Diagnostic Studies  Results Reviewed     None                 XR elbow 3+ views RIGHT (Results Pending)              Procedures  Procedures       Phone Contacts  ED Phone Contact    ED Course  ED Course                                MDM  Number of Diagnoses or Management Options  Right elbow pain:   Diagnosis management comments: Pulse ox 98% on RA indicating adequate oxygenation  Xray R elbow:  No fx or dislocation as read by me    Patient cannot take NSAIDS and tylenol not working  Will have her try Voltaren gel since systemic side effects should be lower  Will give Morphine for breakthrough pain  Amount and/or Complexity of Data Reviewed  Tests in the radiology section of CPT®: ordered and reviewed  Decide to obtain previous medical records or to obtain history from someone other than the patient: yes  Review and summarize past medical records: yes  Independent visualization of images, tracings, or specimens: yes    Patient Progress  Patient progress: stable    CritCare Time    Disposition  Final diagnoses:   None     ED Disposition     None      Follow-up Information    None       Patient's Medications   Discharge Prescriptions    No medications on file     No discharge procedures on file      ED Provider  Electronically Signed by           Diego Marie DO  01/29/18 1675

## 2018-02-01 PROBLEM — E78.2 ELEVATED TRIGLYCERIDES WITH HIGH CHOLESTEROL: Status: ACTIVE | Noted: 2017-06-02

## 2018-02-01 PROBLEM — E79.0 HYPERURICEMIA: Status: ACTIVE | Noted: 2017-06-23

## 2018-02-01 PROBLEM — I87.2 CHRONIC VENOUS INSUFFICIENCY: Status: ACTIVE | Noted: 2017-06-02

## 2018-02-01 PROBLEM — N18.30 CKD (CHRONIC KIDNEY DISEASE), STAGE III (HCC): Status: ACTIVE | Noted: 2017-05-19

## 2018-02-01 PROBLEM — G89.29 CHRONIC LOW BACK PAIN: Status: ACTIVE | Noted: 2017-10-24

## 2018-02-01 PROBLEM — I50.33 ACUTE ON CHRONIC DIASTOLIC CONGESTIVE HEART FAILURE (HCC): Status: ACTIVE | Noted: 2017-06-02

## 2018-02-01 PROBLEM — M54.50 CHRONIC LOW BACK PAIN: Status: ACTIVE | Noted: 2017-10-24

## 2018-02-01 PROBLEM — E78.5 HYPERLIPEMIA: Status: ACTIVE | Noted: 2017-11-15

## 2018-02-01 NOTE — PRE-PROCEDURE INSTRUCTIONS
Pre-Surgery Instructions:   Medication Instructions    allopurinol (ZYLOPRIM) 300 mg tablet Patient was instructed by Physician and understands   aspirin 81 MG tablet Patient was instructed by Physician and understands   bumetanide (BUMEX) 2 mg tablet Patient was instructed by Physician and understands   Cholecalciferol (VITAMIN D-3 PO) Patient was instructed by Physician and understands   clobetasol (TEMOVATE) 0 05 % ointment Patient was instructed by Physician and understands   diclofenac sodium (VOLTAREN) 1 % Patient was instructed by Physician and understands   gabapentin (NEURONTIN) 300 mg capsule Patient was instructed by Physician and understands   levothyroxine 125 mcg tablet Patient was instructed by Physician and understands   Olmesartan-Amlodipine-HCTZ (TRIBENZOR) 40-10-12 5 MG TABS Patient was instructed by Physician and understands   omeprazole (PriLOSEC) 20 mg delayed release capsule Patient was instructed by Physician and understands   propranolol (INDERAL) 80 mg tablet Patient was instructed by Physician and understands

## 2018-02-02 ENCOUNTER — OFFICE VISIT (OUTPATIENT)
Dept: FAMILY MEDICINE CLINIC | Facility: CLINIC | Age: 78
End: 2018-02-02
Payer: MEDICARE

## 2018-02-02 VITALS
RESPIRATION RATE: 18 BRPM | OXYGEN SATURATION: 97 % | WEIGHT: 212 LBS | HEART RATE: 58 BPM | SYSTOLIC BLOOD PRESSURE: 130 MMHG | BODY MASS INDEX: 39.01 KG/M2 | DIASTOLIC BLOOD PRESSURE: 70 MMHG | HEIGHT: 62 IN

## 2018-02-02 DIAGNOSIS — M25.521 RIGHT ELBOW PAIN: Primary | ICD-10-CM

## 2018-02-02 PROBLEM — M11.232 PSEUDOGOUT OF LEFT WRIST: Status: ACTIVE | Noted: 2017-06-23

## 2018-02-02 PROBLEM — I89.0 LYMPHEDEMA: Status: ACTIVE | Noted: 2017-06-02

## 2018-02-02 PROBLEM — N20.0 NEPHROLITHIASIS: Status: ACTIVE | Noted: 2017-06-21

## 2018-02-02 PROBLEM — M51.16 LUMBAR DISC HERNIATION WITH RADICULOPATHY: Status: ACTIVE | Noted: 2017-12-08

## 2018-02-02 PROCEDURE — 99213 OFFICE O/P EST LOW 20 MIN: CPT | Performed by: PHYSICIAN ASSISTANT

## 2018-02-02 NOTE — PROGRESS NOTES
Assessment/Plan:    No problem-specific Assessment & Plan notes found for this encounter  Diagnoses and all orders for this visit:    Right elbow pain, resolved  - directed Pt to continue with rest  - Directed Pt to use the voltaren gel as needed for symptomatic relief  - no neurologic defects noted  - Return to PCP in 1 month        Subjective:      Patient ID: Angela Rasmussen is a 65 y o  female  Pt is presenting today for FU of visit to [facility] 5 days ago for right elbow pain  She states her elbow pain and swelling has significantly improved  She denies any numbness, weakness, decreased ROM of her right arm  She was having issues with activities of daily living prior to the ED visit but has returned to normal function  History of gout and arthritis  She was given morphine pills but has not taken due symptoms of feeling drowsy  The following portions of the patient's history were reviewed and updated as appropriate: allergies, current medications, past medical history, past social history and problem list     Review of Systems   Constitutional: Negative for fatigue, fever and unexpected weight change  HENT: Negative for rhinorrhea and sore throat  Respiratory: Negative for cough, shortness of breath and wheezing  Cardiovascular: Negative for chest pain, palpitations and leg swelling  Gastrointestinal: Negative for constipation, diarrhea and nausea  Musculoskeletal: Negative for arthralgias and myalgias  Neurological: Negative for weakness and numbness  Objective:  /70   Pulse 58   Resp 18   Ht 5' 1 5" (1 562 m)   Wt 96 2 kg (212 lb)   SpO2 97%   BMI 39 41 kg/m²      Physical Exam   Constitutional: She is oriented to person, place, and time  She appears well-developed and well-nourished  No distress  HENT:   Head: Normocephalic and atraumatic     Right Ear: External ear normal    Left Ear: External ear normal    Mouth/Throat: Oropharynx is clear and moist  No oropharyngeal exudate  Eyes: Pupils are equal, round, and reactive to light  Neck: Normal range of motion  Neck supple  Cardiovascular: Normal rate, regular rhythm, normal heart sounds and intact distal pulses  Exam reveals no gallop and no friction rub  No murmur heard  Pulmonary/Chest: Effort normal and breath sounds normal  No respiratory distress  She has no wheezes  She has no rales  Musculoskeletal: Normal range of motion  She exhibits no edema, tenderness or deformity  Right hand: Normal sensation noted  Decreased strength (4/5  strength) noted  Left hand: Normal sensation noted  Decreased strength (4/5  strength) noted  Lymphadenopathy:     She has no cervical adenopathy  Neurological: She is alert and oriented to person, place, and time  Skin: She is not diaphoretic

## 2018-02-04 ENCOUNTER — ANESTHESIA EVENT (OUTPATIENT)
Dept: GASTROENTEROLOGY | Facility: AMBULARY SURGERY CENTER | Age: 78
End: 2018-02-04
Payer: MEDICARE

## 2018-02-04 NOTE — ANESTHESIA PREPROCEDURE EVALUATION
Review of Systems/Medical History  Patient summary reviewed  Chart reviewed      Cardiovascular  Hyperlipidemia, Hypertension , CHF ,   Comment: Echo complete with contrast if indicated   Status: Final result  PACS Images     Show images for Echo complete with contrast if indicated  Order Report      Order Details   Study Result     [address]  [address] [facility]  [place] [address]  [phone]     Transthoracic Echocardiogram  2D, M-mode, Doppler, and Color Doppler     Study date:  15-Berny-2016     Patient: Meche Washburn  MR number: [id number]  Account number: [de-identified]  : 1940  Age: 67 years  Gender: Female  Status: Routine  Location: Echo lab  Height: 61 in  Weight: 214 5 lb  BP: 132/ 84 mmHg     Indications: HTN     Diagnoses: 401 9 - HYPERTENSION NOS     Sonographer:  Edgardo Pollard  Primary Physician:  Ras Pedersen  Referring Physician:  Zainab Harley:  Keyla Michelle  Interpreting Physician:  DO AFIA Parish     LEFT VENTRICLE:  Systolic function was at the lower limits of normal  Ejection fraction was  estimated in the range of 50 % to 55 %  There were no regional wall motion abnormalities  There was moderate concentric hypertrophy  Features were consistent with a pseudonormal left ventricular filling pattern,  with concomitant abnormal relaxation and increased filling pressure (grade 2  diastolic dysfunction)  Doppler parameters were consistent with elevated mean  left atrial filling pressure      LEFT ATRIUM:  The atrium was moderately dilated      RIGHT ATRIUM:  The atrium was markedly dilated      MITRAL VALVE:  There was marked annular calcification  There was moderate regurgitation      TRICUSPID VALVE:  There was mild regurgitation  Pulmonary artery systolic pressure was mildly increased    Estimated peak PA pressure was 46 mmHg      HISTORY: PRIOR HISTORY: Patient has no history of cardiovascular disease      PROCEDURE: The procedure was performed in the echo lab  This was a routine  study  The transthoracic approach was used  The study included complete 2D  imaging, M-mode, complete spectral Doppler, and color Doppler  The heart rate  was 77 bpm, at the start of the study  Echocardiographic views were limited due  to poor acoustic window availability and decreased penetration  This was a  technically difficult study      LEFT VENTRICLE: Size was normal  Systolic function was at the lower limits of  normal  Ejection fraction was estimated in the range of 50 % to 55 %  There  were no regional wall motion abnormalities  There was moderate concentric  hypertrophy  DOPPLER: Features were consistent with a pseudonormal left  ventricular filling pattern, with concomitant abnormal relaxation and increased  filling pressure (grade 2 diastolic dysfunction)  Doppler parameters were  consistent with elevated mean left atrial filling pressure      RIGHT VENTRICLE: The size was normal  Systolic function was normal  DOPPLER:  Systolic pressure was within the normal range      LEFT ATRIUM: The atrium was moderately dilated  No thrombus was identified      RIGHT ATRIUM: The atrium was markedly dilated      MITRAL VALVE: There was marked annular calcification  Valve structure was  normal  There was normal leaflet separation  No echocardiographic evidence for  prolapse  DOPPLER: The transmitral velocity was within the normal range  There  was no evidence for stenosis  There was moderate regurgitation      AORTIC VALVE: The valve was trileaflet  Leaflets exhibited normal thickness,  normal cuspal separation, and sclerosis  DOPPLER: Transaortic velocity was  within the normal range  There was no evidence for stenosis  There was no  regurgitation      TRICUSPID VALVE: The valve structure was normal  There was normal leaflet  separation  DOPPLER: The transtricuspid velocity was within the normal range  There was mild regurgitation   Pulmonary artery systolic pressure was mildly  increased  Estimated peak PA pressure was 46 mmHg      PULMONIC VALVE: Leaflets exhibited normal thickness, no calcification, and  normal cuspal separation  DOPPLER: The transpulmonic velocity was within the  normal range  There was mild regurgitation      PERICARDIUM: There was no thickening  There was no pericardial effusion      AORTA: The root exhibited normal size      PULMONARY ARTERY: The size was normal  The morphology appeared normal      SYSTEM MEASUREMENT TABLES     2D mode  AoR Diam 2D: 2 8 cm  LA Diam (2D): 5 3 cm  LA/Ao (2D): 1 89  FS (2D Teich): 25 3 %  IVSd (2D): 1 44 cm  LVDEV: 98 3 cm³  LVESV: 49 1 cm³  LVIDd(2D): 4 62 cm  LVISd (2D): 3 45 cm  LVPWd (2D): 1 3 cm  SV (Teich): 49 2 cm³     Apical four chamber  LVEF A4C: 55 %     Unspecified Scan Mode  MV Peak A Jesse: 1110 mm/s  MV Peak E Jesse  Mean: 1400 mm/s  MVA (PHT): 3 55 cm squared  PHT: 58 ms  Max P mm(Hg)  V Max: 2990 mm/s  Vmax: 3050 mm/s  RA Area: 19 6 cm squared  RA Volume: 55 3 cm³  TAPSE: 1 9 cm     [facility] Accredited [facility]     Prepared and electronically signed by  Misa Pate DO  Signed 2016 17:37:47       ,  Pulmonary  Sleep apnea ,        GI/Hepatic    GERD ,  Hiatal hernia, Bowel prep       Kidney stones,        Endo/Other  History of thyroid disease , hypothyroidism,   Obesity  super morbid obesity   GYN       Hematology   Musculoskeletal    Arthritis     Neurology    Neuromuscular disease , Headaches,    Psychology           Physical Exam    Airway    Mallampati score: II  TM Distance: >3 FB  Neck ROM: full     Dental       Cardiovascular  Rhythm: regular, Rate: normal,     Pulmonary  Breath sounds clear to auscultation,     Other Findings        Anesthesia Plan  ASA Score- 4     Anesthesia Type- general with ASA Monitors  Additional Monitors:   Airway Plan:         Plan Factors-    Induction- intravenous      Postoperative Plan-     Informed Consent- Anesthetic plan and risks discussed with patient

## 2018-02-05 ENCOUNTER — ANESTHESIA (OUTPATIENT)
Dept: GASTROENTEROLOGY | Facility: AMBULARY SURGERY CENTER | Age: 78
End: 2018-02-05
Payer: MEDICARE

## 2018-02-05 ENCOUNTER — HOSPITAL ENCOUNTER (OUTPATIENT)
Facility: AMBULARY SURGERY CENTER | Age: 78
Setting detail: OUTPATIENT SURGERY
Discharge: HOME | End: 2018-02-05
Attending: INTERNAL MEDICINE | Admitting: INTERNAL MEDICINE
Payer: MEDICARE

## 2018-02-05 VITALS
SYSTOLIC BLOOD PRESSURE: 113 MMHG | TEMPERATURE: 96 F | HEART RATE: 69 BPM | DIASTOLIC BLOOD PRESSURE: 54 MMHG | OXYGEN SATURATION: 99 % | RESPIRATION RATE: 18 BRPM

## 2018-02-05 DIAGNOSIS — K52.89: ICD-10-CM

## 2018-02-05 DIAGNOSIS — R19.7 DIARRHEA: ICD-10-CM

## 2018-02-05 PROCEDURE — 88305 TISSUE EXAM BY PATHOLOGIST: CPT | Performed by: PATHOLOGY

## 2018-02-05 PROCEDURE — 88305 TISSUE EXAM BY PATHOLOGIST: CPT | Performed by: INTERNAL MEDICINE

## 2018-02-05 RX ORDER — PROPOFOL 10 MG/ML
INJECTION, EMULSION INTRAVENOUS AS NEEDED
Status: DISCONTINUED | OUTPATIENT
Start: 2018-02-05 | End: 2018-02-05 | Stop reason: SURG

## 2018-02-05 RX ORDER — SODIUM CHLORIDE 9 MG/ML
125 INJECTION, SOLUTION INTRAVENOUS CONTINUOUS
Status: DISCONTINUED | OUTPATIENT
Start: 2018-02-05 | End: 2018-02-05 | Stop reason: HOSPADM

## 2018-02-05 RX ADMIN — PROPOFOL 25 MG: 10 INJECTION, EMULSION INTRAVENOUS at 10:44

## 2018-02-05 RX ADMIN — PROPOFOL 30 MG: 10 INJECTION, EMULSION INTRAVENOUS at 10:39

## 2018-02-05 RX ADMIN — SODIUM CHLORIDE 125 ML/HR: 0.9 INJECTION, SOLUTION INTRAVENOUS at 10:32

## 2018-02-05 RX ADMIN — PROPOFOL 100 MG: 10 INJECTION, EMULSION INTRAVENOUS at 10:30

## 2018-02-05 NOTE — DISCHARGE INSTRUCTIONS
We removed 1 polyp your hemorrhoids are present not really large we did biopsies for the history of colitis and we will call you in a week    Repeat your colonoscopy 2-3 years

## 2018-02-05 NOTE — OP NOTE
COLONOSCOPY  Procedure Note    Dung Sami  2/5/2018    Procedure:  Colonoscopy with biopsy and cold biopsy polypectomy    Pre-op Diagnosis:  History of colitis and history of colon polyps     Post-op Diagnosis: see impression below    No Known Allergies    Surgeon(s) and Role:     * Lissy San MD - Primary     Bleeding Abnormalities:  None    Indications:  78-year-old lady history of colitis history of colon polyps and hemorrhoids here for colonoscopy  Pre-Procedure Exam:  See consult  Anesthesia:  Mac     Instruments:  Olympus colonoscope    Technique:   ASA class 3 patient was verbally identified less than 100% sensitivity discussed informed consent was obtained which included description of the procedure alternatives risks benefits possible complications including pain bleeding infection perforation arrhythmias and respiratory depression  With the left side down the patient was sedated digital exam was performed scope was advanced to the cecum upon pull out although was evaluated as best as possible  Tolerated procedure well without any immediate complications  Estimated Blood Loss:  Less than 1 cc    Findings:  Good prep scope the cecum panics opening normal distal terminal ileum normal diverticulum in the cecum random biopsies ascending Container 1  Random biopsies transverse container 2  Random biopsies descending container 3 splenic flexure polyp removed with cold biopsy container 4  Random biopsy sigmoid container 5  Random biopsies rectum container 6    Small internal hemorrhoids  Impressions:  Internal hemorrhoids distant history of colitis and polyps 1 polyp removed today random biopsies performed  Plan:  Colonoscopy 2-3 years    MD Lissy Navas MD     Date: 2/5/2018  Time: 10:55 AM

## 2018-02-05 NOTE — ANESTHESIA POSTPROCEDURE EVALUATION
Post-Op Assessment Note      CV Status:  Stable    Mental Status:  Alert and awake    Hydration Status:  Euvolemic    PONV Controlled:  Controlled    Airway Patency:  Patent    Post Op Vitals Reviewed: Yes          Staff: Anesthesiologist           BP      Temp     Pulse    Resp      SpO2

## 2018-02-05 NOTE — H&P
Physician Requesting Consult: Moo Campos MD       Reason for Consult / Principal Problem:  History of colon polyps and colitis      HPI:  21-year-old lady history of colitis in the past also history of colon polyps here for colonoscopy    Allergies: No Known Allergies    Medications:No current facility-administered medications for this encounter  Past Medical history:  Past Medical History:   Diagnosis Date    Arthritis     joints    Disease of thyroid gland     GERD (gastroesophageal reflux disease)     Gout     Hypertension     Sleep apnea     wears CPAP       Past Surgical History:   Past Surgical History:   Procedure Laterality Date    ABDOMINAL SURGERY          BREAST SURGERY Right     cyst removal    CARPAL TUNNEL RELEASE Left 1989    CARPAL TUNNEL RELEASE Right 2004    CATARACT EXTRACTION       SECTION  1960    x1    DILATION AND CURETTAGE OF UTERUS  1967    EYE SURGERY Left 2006    cataracts    HERNIA REPAIR      umbilical hernia    KNEE ARTHROSCOPY Right     [place] REMV CATARACT EXTRACAP,INSERT LENS Right 3/27/2017    Procedure: EXTRACTION EXTRACAPSULAR CATARACT PHACO INTRAOCULAR LENS (IOL); Surgeon: Radha Antoine MD;  Location: [facility] MAIN OR;  Service: Ophthalmology       Social history:  Reviewed see chart    Review of Systems: Otherwise multiple systems reviewed and/or noncontributory- see chart    Physical Exam:  Vital signs stable afebrile alert oriented x3 regular rate rhythm clear to auscultation abdomen benign    Lab Results: No results found for this or any previous visit (from the past 24 hour(s))  Imaging Studies: Xr Elbow 3+ Views Right    Result Date: 2018  Narrative: RIGHT ELBOW INDICATION: Right elbow pain  COMPARISON: None VIEWS:  4 IMAGES:  4 FINDINGS: There is no acute fracture or dislocation  There is no joint effusion  An olecranon spur is present  Spurring present adjacent to the medial epi condyle    Accessory ossicles present adjacent to the lateral epicondyle  No lytic or blastic lesions are seen  Soft tissues are unremarkable  Impression: No acute osseous abnormality  Degenerative changes   Workstation performed: [id number]       Assessment:  As above    Plan:  Colonoscopy today    Silvana Lemon MD

## 2018-02-15 DIAGNOSIS — I10 ESSENTIAL (PRIMARY) HYPERTENSION: ICD-10-CM

## 2018-02-15 DIAGNOSIS — E03.9 HYPOTHYROIDISM: ICD-10-CM

## 2018-02-15 DIAGNOSIS — E78.5 HYPERLIPIDEMIA: ICD-10-CM

## 2018-02-15 DIAGNOSIS — E79.0 HYPERURICEMIA WITHOUT SIGNS OF INFLAMMATORY ARTHRITIS AND TOPHACEOUS DISEASE: ICD-10-CM

## 2018-02-15 DIAGNOSIS — R60.9 EDEMA: ICD-10-CM

## 2018-02-15 DIAGNOSIS — M10.9 GOUT: ICD-10-CM

## 2018-02-19 ENCOUNTER — LAB (OUTPATIENT)
Dept: LAB | Facility: CLINIC | Age: 78
End: 2018-02-19
Payer: MEDICARE

## 2018-02-19 DIAGNOSIS — M10.9 GOUT: ICD-10-CM

## 2018-02-19 DIAGNOSIS — N18.30 CHRONIC KIDNEY DISEASE, STAGE III (MODERATE) (HCC): ICD-10-CM

## 2018-02-19 DIAGNOSIS — E78.5 HYPERLIPIDEMIA: ICD-10-CM

## 2018-02-19 DIAGNOSIS — I70.209 ATHEROSCLEROSIS OF NATIVE ARTERY OF EXTREMITY (HCC): ICD-10-CM

## 2018-02-19 DIAGNOSIS — E79.0 HYPERURICEMIA WITHOUT SIGNS OF INFLAMMATORY ARTHRITIS AND TOPHACEOUS DISEASE: ICD-10-CM

## 2018-02-19 DIAGNOSIS — R60.9 EDEMA: ICD-10-CM

## 2018-02-19 DIAGNOSIS — I10 ESSENTIAL (PRIMARY) HYPERTENSION: ICD-10-CM

## 2018-02-19 DIAGNOSIS — E03.9 HYPOTHYROIDISM: ICD-10-CM

## 2018-02-19 LAB
ALBUMIN SERPL BCP-MCNC: 3.4 G/DL (ref 3.5–5)
ALP SERPL-CCNC: 118 U/L (ref 46–116)
ALT SERPL W P-5'-P-CCNC: 20 U/L (ref 12–78)
ANION GAP SERPL CALCULATED.3IONS-SCNC: 8 MMOL/L (ref 4–13)
AST SERPL W P-5'-P-CCNC: 16 U/L (ref 5–45)
BASOPHILS # BLD AUTO: 0.02 THOUSANDS/ΜL (ref 0–0.1)
BASOPHILS NFR BLD AUTO: 0 % (ref 0–1)
BILIRUB SERPL-MCNC: 0.36 MG/DL (ref 0.2–1)
BUN SERPL-MCNC: 35 MG/DL (ref 5–25)
CALCIUM SERPL-MCNC: 9.2 MG/DL (ref 8.3–10.1)
CHLORIDE SERPL-SCNC: 109 MMOL/L (ref 100–108)
CHOLEST SERPL-MCNC: 159 MG/DL (ref 50–200)
CO2 SERPL-SCNC: 25 MMOL/L (ref 21–32)
CREAT SERPL-MCNC: 1.05 MG/DL (ref 0.6–1.3)
CREAT UR-MCNC: 129 MG/DL
EOSINOPHIL # BLD AUTO: 0.15 THOUSAND/ΜL (ref 0–0.61)
EOSINOPHIL NFR BLD AUTO: 2 % (ref 0–6)
ERYTHROCYTE [DISTWIDTH] IN BLOOD BY AUTOMATED COUNT: 16.1 % (ref 11.6–15.1)
GFR SERPL CREATININE-BSD FRML MDRD: 51 ML/MIN/1.73SQ M
GLUCOSE P FAST SERPL-MCNC: 106 MG/DL (ref 65–99)
HCT VFR BLD AUTO: 35.8 % (ref 34.8–46.1)
HDLC SERPL-MCNC: 41 MG/DL (ref 40–60)
HGB BLD-MCNC: 11.5 G/DL (ref 11.5–15.4)
LDLC SERPL CALC-MCNC: 90 MG/DL (ref 0–100)
LYMPHOCYTES # BLD AUTO: 0.86 THOUSANDS/ΜL (ref 0.6–4.47)
LYMPHOCYTES NFR BLD AUTO: 13 % (ref 14–44)
MCH RBC QN AUTO: 28.2 PG (ref 26.8–34.3)
MCHC RBC AUTO-ENTMCNC: 32.1 G/DL (ref 31.4–37.4)
MCV RBC AUTO: 88 FL (ref 82–98)
MONOCYTES # BLD AUTO: 0.49 THOUSAND/ΜL (ref 0.17–1.22)
MONOCYTES NFR BLD AUTO: 7 % (ref 4–12)
NEUTROPHILS # BLD AUTO: 5.06 THOUSANDS/ΜL (ref 1.85–7.62)
NEUTS SEG NFR BLD AUTO: 78 % (ref 43–75)
NRBC BLD AUTO-RTO: 0 /100 WBCS
PHOSPHATE SERPL-MCNC: 2.9 MG/DL (ref 2.3–4.1)
PLATELET # BLD AUTO: 213 THOUSANDS/UL (ref 149–390)
PMV BLD AUTO: 11.4 FL (ref 8.9–12.7)
POTASSIUM SERPL-SCNC: 4.2 MMOL/L (ref 3.5–5.3)
PROT SERPL-MCNC: 7.7 G/DL (ref 6.4–8.2)
PROT UR-MCNC: 20 MG/DL
PROT/CREAT UR: 0.16 MG/G{CREAT} (ref 0–0.1)
PTH-INTACT SERPL-MCNC: 83.8 PG/ML (ref 14–72)
RBC # BLD AUTO: 4.08 MILLION/UL (ref 3.81–5.12)
SODIUM SERPL-SCNC: 142 MMOL/L (ref 136–145)
TRIGL SERPL-MCNC: 140 MG/DL
URATE SERPL-MCNC: 7 MG/DL (ref 2–6.8)
WBC # BLD AUTO: 6.61 THOUSAND/UL (ref 4.31–10.16)

## 2018-02-19 PROCEDURE — 80061 LIPID PANEL: CPT

## 2018-02-19 PROCEDURE — 36415 COLL VENOUS BLD VENIPUNCTURE: CPT

## 2018-02-19 PROCEDURE — 80053 COMPREHEN METABOLIC PANEL: CPT

## 2018-02-19 PROCEDURE — 84156 ASSAY OF PROTEIN URINE: CPT

## 2018-02-19 PROCEDURE — 83695 ASSAY OF LIPOPROTEIN(A): CPT

## 2018-02-19 PROCEDURE — 83970 ASSAY OF PARATHORMONE: CPT

## 2018-02-19 PROCEDURE — 82570 ASSAY OF URINE CREATININE: CPT

## 2018-02-19 PROCEDURE — 84100 ASSAY OF PHOSPHORUS: CPT

## 2018-02-19 PROCEDURE — 85025 COMPLETE CBC W/AUTO DIFF WBC: CPT

## 2018-02-19 PROCEDURE — 84550 ASSAY OF BLOOD/URIC ACID: CPT

## 2018-02-20 LAB — LPA SERPL-SCNC: 35 NMOL/L

## 2018-02-21 NOTE — PROGRESS NOTES
Labs reviewed from 1 day ago  Cr stable  Electrolytes stable  PTH improved  UPCR stable  Will review with patient at appt

## 2018-02-22 ENCOUNTER — OFFICE VISIT (OUTPATIENT)
Dept: FAMILY MEDICINE CLINIC | Facility: CLINIC | Age: 78
End: 2018-02-22
Payer: MEDICARE

## 2018-02-22 VITALS
OXYGEN SATURATION: 98 % | WEIGHT: 210 LBS | HEART RATE: 53 BPM | HEIGHT: 61 IN | BODY MASS INDEX: 39.65 KG/M2 | DIASTOLIC BLOOD PRESSURE: 82 MMHG | SYSTOLIC BLOOD PRESSURE: 120 MMHG

## 2018-02-22 DIAGNOSIS — M10.9 GOUT OF WRIST, UNSPECIFIED CAUSE, UNSPECIFIED CHRONICITY, UNSPECIFIED LATERALITY: ICD-10-CM

## 2018-02-22 DIAGNOSIS — K21.00 CHRONIC REFLUX ESOPHAGITIS: ICD-10-CM

## 2018-02-22 DIAGNOSIS — I10 BENIGN ESSENTIAL HYPERTENSION: Primary | ICD-10-CM

## 2018-02-22 DIAGNOSIS — E03.9 HYPOTHYROIDISM, UNSPECIFIED TYPE: ICD-10-CM

## 2018-02-22 DIAGNOSIS — E78.5 HYPERLIPIDEMIA, UNSPECIFIED HYPERLIPIDEMIA TYPE: ICD-10-CM

## 2018-02-22 PROBLEM — I89.0 LYMPHEDEMA: Status: RESOLVED | Noted: 2017-06-02 | Resolved: 2018-02-22

## 2018-02-22 PROCEDURE — 99214 OFFICE O/P EST MOD 30 MIN: CPT | Performed by: FAMILY MEDICINE

## 2018-02-22 RX ORDER — LEVOTHYROXINE SODIUM 0.1 MG/1
100 TABLET ORAL DAILY
COMMUNITY
End: 2018-02-22 | Stop reason: SDUPTHER

## 2018-02-22 RX ORDER — MELATONIN
1 DAILY
COMMUNITY
End: 2020-02-29 | Stop reason: HOSPADM

## 2018-02-22 RX ORDER — SPIRONOLACTONE 25 MG/1
1 TABLET ORAL DAILY
COMMUNITY
Start: 2017-10-24 | End: 2018-06-04 | Stop reason: SDUPTHER

## 2018-02-22 RX ORDER — AMLODIPINE AND OLMESARTAN MEDOXOMIL 5; 20 MG/1; MG/1
1 TABLET ORAL DAILY
COMMUNITY
Start: 2017-07-27 | End: 2018-03-01

## 2018-02-22 RX ORDER — COLCHICINE 0.6 MG/1
1 TABLET ORAL DAILY
COMMUNITY
Start: 2017-11-15 | End: 2018-03-12

## 2018-02-22 RX ORDER — ALLOPURINOL 300 MG/1
1 TABLET ORAL DAILY
COMMUNITY
Start: 2017-11-15 | End: 2018-02-22

## 2018-02-22 RX ORDER — LEVOTHYROXINE SODIUM 0.1 MG/1
100 TABLET ORAL DAILY
Qty: 90 TABLET | Refills: 0
Start: 2018-02-22 | End: 2018-06-04 | Stop reason: SDUPTHER

## 2018-02-22 NOTE — PROGRESS NOTES
Assessment/Plan:    Chronic reflux esophagitis  Continue Low dose omeprazole  Fup with GI as advised  Hypothyroidism  TSH stable  Pt will continue   Levothyroxine 100 micrograms daily  Recheck TSH after 6 months  Diagnoses and all orders for this visit:    Benign essential hypertension  -     Microalbumin / creatinine urine ratio; Future    Chronic reflux esophagitis    Hypothyroidism, unspecified type  -     TSH, 3rd generation with T4 reflex; Future    Gout of wrist, unspecified cause, unspecified chronicity, unspecified laterality  -     CBC and differential; Future  -     Uric acid; Future    Hyperlipidemia, unspecified hyperlipidemia type  -     Comprehensive metabolic panel; Future  -     Lipid panel; Future    Other orders  -     amlodipine-olmesartan (BLACK) 5-20 MG; Take 1 tablet by mouth daily  -     colchicine (COLCRYS) 0 6 mg tablet; Take 1 tablet by mouth daily  -     Omega-3-acid Ethyl Esters & D3 1 & 1000 GM & UNIT KIT; Take 2 capsules by mouth 2 (two) times a day  -     spironolactone (ALDACTONE) 25 mg tablet; Take 1 tablet by mouth daily  -     cholecalciferol (VITAMIN D3) 1,000 units tablet; Take 1 tablet by mouth daily  -     Discontinue: allopurinol (ZYLOPRIM) 300 mg tablet; Take 1 tablet by mouth daily  -     levothyroxine 100 mcg tablet; Take 100 mcg by mouth daily          Subjective:      Patient ID: Yonas Murray is a 65 y o  female  patient is here for routine follow-up  Patient's LDL is at goal on current dose of statin  She has a history of CAD,  CHF for which she follows up with Cardiology  Patient's blood pressure is stable on current dose of combination of amlodipine -olmesartan 5 to 20 milligram daily  Has chronic kidney disease for which she sees Nephrology  Her most recent kidney   Function is within normal limits  She also has a history of gout for which she was started on allopurinol  Her uric acid is improved from 11 7 to 7 now      Patient has not had a gout attack since  Patient states that she does not remember if she needs any refills today but will call us back for refills  Hypertension   This is a chronic problem  The current episode started more than 1 year ago  The problem has been gradually improving since onset  The problem is controlled  Pertinent negatives include no chest pain, headaches, orthopnea, palpitations, PND or shortness of breath  There are no associated agents to hypertension  Risk factors for coronary artery disease include post-menopausal state, obesity and sedentary lifestyle (CHF)  Treatments tried: Amlodipine olmesartan 5-20 mg daily  The current treatment provides significant improvement  There are no compliance problems  Hypertensive end-organ damage includes heart failure, renovascular disease and a thyroid problem  Heartburn   She reports no abdominal pain, no belching, no chest pain, no early satiety or no heartburn  This is a chronic problem  The current episode started more than 1 year ago  The problem occurs occasionally  The symptoms are aggravated by certain foods  Pertinent negatives include no fatigue  She has tried a PPI for the symptoms  The treatment provided significant relief  Past procedures include an EGD  Thyroid Problem   Presents for follow-up visit  Patient reports no anxiety, cold intolerance, depressed mood, fatigue, heat intolerance, leg swelling, palpitations or weight gain  The symptoms have been stable  Her past medical history is significant for heart failure         Past Medical History:   Diagnosis Date    Arthritis     joints    Cataract     Disease of thyroid gland     Eczema     GERD (gastroesophageal reflux disease)     Gout     Heart failure (HCC)     Hepatitis     Hiatal hernia     Hypertension     Onychomycosis     last assessed: 4/29/16    Orthopnea     resolved: 1/8/16    Sleep apnea     wears CPAP       Family History   Problem Relation Age of Onset    Diabetes Mother    America Melvin Coronary artery disease Mother     Arthritis Mother     Hypertension Mother     Hypertension Father     Diabetes Brother     Diabetes Son     Arthritis Family        Past Surgical History:   Procedure Laterality Date    ABDOMINAL SURGERY          BREAST SURGERY Right     cyst removal    CARPAL TUNNEL RELEASE Left     CARPAL TUNNEL RELEASE Right     CATARACT EXTRACTION       SECTION  1960    x1    COLONOSCOPY N/A 2018    Procedure: COLONOSCOPY;  Surgeon: Gali Hoffman MD;  Location: [facility]; Service: Gastroenterology    DILATION AND CURETTAGE OF UTERUS  1967    EYE SURGERY Left 2006    cataracts    HERNIA REPAIR      umbilical hernia    HYSTERECTOMY      INCISIONAL HERNIA REPAIR      incarcerated    KNEE ARTHROSCOPY Right     NH REMV CATARACT EXTRACAP,INSERT LENS Right 3/27/2017    Procedure: EXTRACTION EXTRACAPSULAR CATARACT PHACO INTRAOCULAR LENS (IOL); Surgeon: Ronald Maguire MD;  Location: [facility] MAIN OR;  Service: Ophthalmology        reports that she has never smoked  She has never used smokeless tobacco  She reports that she does not drink alcohol or use drugs        Current Outpatient Prescriptions:     amlodipine-olmesartan (BLACK) 5-20 MG, Take 1 tablet by mouth daily, Disp: , Rfl:     aspirin 81 MG tablet, Take 81 mg by mouth every morning  , Disp: , Rfl:     bumetanide (BUMEX) 2 mg tablet, Take 2 mg by mouth every morning, Disp: , Rfl:     cholecalciferol (VITAMIN D3) 1,000 units tablet, Take 1 tablet by mouth daily, Disp: , Rfl:     colchicine (COLCRYS) 0 6 mg tablet, Take 1 tablet by mouth daily, Disp: , Rfl:     gabapentin (NEURONTIN) 300 mg capsule, Take 300 mg by mouth 2 (two) times a day, Disp: , Rfl:     levothyroxine 100 mcg tablet, Take 100 mcg by mouth daily, Disp: , Rfl:     Omega-3-acid Ethyl Esters & D3 1 & 1000 GM & UNIT KIT, Take 2 capsules by mouth 2 (two) times a day, Disp: , Rfl:     omeprazole (PriLOSEC) 20 mg delayed release capsule, Take 20 mg by mouth every morning  , Disp: , Rfl:     propranolol (INDERAL) 80 mg tablet, Take 80 mg by mouth 2 (two) times a day , Disp: , Rfl:     spironolactone (ALDACTONE) 25 mg tablet, Take 1 tablet by mouth daily, Disp: , Rfl:     allopurinol (ZYLOPRIM) 300 mg tablet, Take 300 mg by mouth every morning  , Disp: , Rfl:     clobetasol (TEMOVATE) 0 05 % ointment, Apply 1 application topically 2 (two) times a day , Disp: , Rfl:     diclofenac sodium (VOLTAREN) 1 %, Apply 2 g topically 4 (four) times a day, Disp: 100 g, Rfl: 0    The following portions of the patient's history were reviewed and updated as appropriate: allergies, current medications, past family history, past medical history, past social history, past surgical history and problem list     Review of Systems   Constitutional: Negative  Negative for fatigue and weight gain  HENT: Negative  Respiratory: Negative  Negative for shortness of breath  Cardiovascular: Negative  Negative for chest pain, palpitations, orthopnea and PND  Gastrointestinal: Negative  Negative for abdominal pain and heartburn  Endocrine: Negative  Negative for cold intolerance and heat intolerance  Genitourinary: Negative  Musculoskeletal: Positive for arthralgias  Neurological: Negative  Negative for headaches  Hematological: Negative  Psychiatric/Behavioral: Negative  The patient is not nervous/anxious  Objective:    /82   Pulse (!) 53   Ht 5' 1" (1 549 m)   Wt 95 3 kg (210 lb)   SpO2 98%   BMI 39 68 kg/m²      Physical Exam   Constitutional: She is oriented to person, place, and time  She appears well-developed and well-nourished  Cardiovascular: Normal rate and regular rhythm  Pulmonary/Chest: Effort normal and breath sounds normal    Abdominal: Soft  Musculoskeletal: Normal range of motion  Neurological: She is alert and oriented to person, place, and time     Psychiatric: She has a normal mood and affect   Her behavior is normal  Judgment and thought content normal

## 2018-03-01 ENCOUNTER — OFFICE VISIT (OUTPATIENT)
Dept: CARDIOLOGY CLINIC | Facility: CLINIC | Age: 78
End: 2018-03-01
Payer: MEDICARE

## 2018-03-01 VITALS
SYSTOLIC BLOOD PRESSURE: 120 MMHG | HEART RATE: 65 BPM | BODY MASS INDEX: 39.27 KG/M2 | OXYGEN SATURATION: 97 % | DIASTOLIC BLOOD PRESSURE: 60 MMHG | HEIGHT: 61 IN | WEIGHT: 208 LBS

## 2018-03-01 DIAGNOSIS — I70.209 ATHEROSCLEROSIS OF ARTERIES OF EXTREMITIES (HCC): ICD-10-CM

## 2018-03-01 DIAGNOSIS — I50.40 COMBINED SYSTOLIC AND DIASTOLIC HEART FAILURE, UNSPECIFIED HEART FAILURE CHRONICITY: Primary | ICD-10-CM

## 2018-03-01 DIAGNOSIS — E78.2 MIXED HYPERLIPIDEMIA: ICD-10-CM

## 2018-03-01 DIAGNOSIS — E66.01 MORBID OBESITY WITH BODY MASS INDEX OF 40.0-44.9 IN ADULT (HCC): ICD-10-CM

## 2018-03-01 DIAGNOSIS — I50.33 ACUTE ON CHRONIC DIASTOLIC CONGESTIVE HEART FAILURE (HCC): ICD-10-CM

## 2018-03-01 DIAGNOSIS — G47.33 OBSTRUCTIVE SLEEP APNEA: ICD-10-CM

## 2018-03-01 DIAGNOSIS — I10 HYPERTENSION, UNSPECIFIED TYPE: ICD-10-CM

## 2018-03-01 PROCEDURE — 99214 OFFICE O/P EST MOD 30 MIN: CPT | Performed by: INTERNAL MEDICINE

## 2018-03-01 PROCEDURE — 93000 ELECTROCARDIOGRAM COMPLETE: CPT | Performed by: INTERNAL MEDICINE

## 2018-03-01 RX ORDER — GABAPENTIN 300 MG/1
100 CAPSULE ORAL
COMMUNITY
End: 2018-10-02 | Stop reason: SDUPTHER

## 2018-03-01 RX ORDER — AMLODIPINE AND OLMESARTAN MEDOXOMIL 10; 40 MG/1; MG/1
1 TABLET ORAL DAILY
COMMUNITY
End: 2018-09-11 | Stop reason: SDUPTHER

## 2018-03-01 NOTE — PROGRESS NOTES
Cardiology Follow Up  [facility]  1940  [id number]  [facility]  [facility] ZACHALYSHA Peterson [address] [address]    1  Combined systolic and diastolic heart failure, unspecified heart failure chronicity (HCC)  POCT ECG   2  Hypertension, unspecified type  POCT ECG   3  Acute on chronic diastolic congestive heart failure ([address] )     4  Obstructive sleep apnea     5  Atherosclerosis of arteries of extremities ([facility] )     6  Mixed hyperlipidemia     7  Morbid obesity with body mass index of 40 0-44 9 in adult [facility])        Discussion/Plan:  Acute on chronic diastolic heart failure with a component of lymphedema- improved volume status  Lower ext better  - Weight loss  - Compression socks  - Continue Bumex 1mg daily + propranolol  - Low-salt diet  - Elevation of legs + walking    chronic kidney disease-improved  Creatinine stable  - continue amlodipine + olmesartan  - Bumex 1mg daily    Triglycerides- controlled  - 4000 mg omega-3 daily      6 months  Interval History:  She denies having chest pain  Her edema is better  Taking omega 3 currently  No bleeding or bruising  No dizziness or light-headness  Blood pressure very well controlled  Has back pain      Patient Active Problem List   Diagnosis    Morbid obesity due to excess calories ([facility] [address] )    SHABANA (acute kidney injury) ([facility] [address] )    Benign essential hypertension    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)    Hypothyroidism    Acute on chronic diastolic congestive heart failure (HCC)    Arthropathy of knee    Hallux abductovalgus with bunions, unspecified laterality    Atherosclerosis of arteries of extremities (HCC)    Chronic low back pain    Chronic venous insufficiency    CKD (chronic kidney disease), stage III    Difficulty in walking    Diverticulitis of colon    Elevated triglycerides with high cholesterol    Gout    Hiatal hernia    Hyperlipemia    Hyperuricemia    Knee pain    Lumbar disc herniation with radiculopathy    Morbid obesity with body mass index of 40 0-44 9 in adult (HCC)    Nephrolithiasis    Obstructive sleep apnea    Umbilical hernia    Tarsal tunnel syndrome    Rupture of popliteal cyst    Pseudogout of left wrist    Plantar fascial fibromatosis     Past Medical History:   Diagnosis Date    Arthritis     joints    Cataract     Disease of thyroid gland     Eczema     GERD (gastroesophageal reflux disease)     Gout     Heart failure (HCC)     Hepatitis     Hiatal hernia     Hypertension     Onychomycosis     last assessed: 16    Orthopnea     resolved: 16    Sleep apnea     wears CPAP     Social History     Social History    Marital status: /Civil Union     Spouse name: N/A    Number of children: N/A    Years of education: N/A     Occupational History    Not on file  Social History Main Topics    Smoking status: Never Smoker    Smokeless tobacco: Never Used    Alcohol use No    Drug use: No    Sexual activity: Not on file     Other Topics Concern    Not on file     Social History Narrative    Daily coffee consumption    Lack of exercise    Sleeps 6-7 hours a day          Family History   Problem Relation Age of Onset    Diabetes Mother     Coronary artery disease Mother     Arthritis Mother     Hypertension Mother     Hypertension Father     Diabetes Brother     Diabetes Son     Arthritis Family      Past Surgical History:   Procedure Laterality Date    ABDOMINAL SURGERY          BREAST SURGERY Right     cyst removal    CARPAL TUNNEL RELEASE Left     CARPAL TUNNEL RELEASE Right     CATARACT EXTRACTION       SECTION  1960    x1    COLONOSCOPY N/A 2018    Procedure: COLONOSCOPY;  Surgeon: Jazmine Kam MD;  Location: [facility];   Service: Gastroenterology    DILATION AND CURETTAGE OF UTERUS  1967    EYE SURGERY Left 2006    cataracts    HERNIA REPAIR      umbilical hernia    HYSTERECTOMY      INCISIONAL HERNIA REPAIR      incarcerated    KNEE ARTHROSCOPY Right     PA REMV CATARACT EXTRACAP,INSERT LENS Right 3/27/2017    Procedure: EXTRACTION EXTRACAPSULAR CATARACT PHACO INTRAOCULAR LENS (IOL); Surgeon: Queta Chan MD;  Location[facility];  Service: Ophthalmology       Current Outpatient Prescriptions:     allopurinol (ZYLOPRIM) 300 mg tablet, Take 300 mg by mouth every morning  , Disp: , Rfl:     amlodipine-olmesartan (BLACK) 10-40 MG, Take 1 tablet by mouth daily, Disp: , Rfl:     aspirin 81 MG tablet, Take 81 mg by mouth every morning  , Disp: , Rfl:     bumetanide (BUMEX) 2 mg tablet, Take 2 mg by mouth every morning, Disp: , Rfl:     cholecalciferol (VITAMIN D3) 1,000 units tablet, Take 1 tablet by mouth daily, Disp: , Rfl:     clobetasol (TEMOVATE) 0 05 % ointment, Apply 1 application topically 2 (two) times a day , Disp: , Rfl:     colchicine (COLCRYS) 0 6 mg tablet, Take 1 tablet by mouth daily, Disp: , Rfl:     diclofenac sodium (VOLTAREN) 1 %, Apply 2 g topically 4 (four) times a day, Disp: 100 g, Rfl: 0    gabapentin (NEURONTIN) 300 mg capsule, Take 100 mg by mouth daily at bedtime, Disp: , Rfl:     levothyroxine 100 mcg tablet, Take 1 tablet (100 mcg total) by mouth daily, Disp: 90 tablet, Rfl: 0    Omega-3-acid Ethyl Esters & D3 1 & 1000 GM & UNIT KIT, Take 2 capsules by mouth 2 (two) times a day, Disp: , Rfl:     omeprazole (PriLOSEC) 20 mg delayed release capsule, Take 20 mg by mouth every morning  , Disp: , Rfl:     propranolol (INDERAL) 80 mg tablet, Take 80 mg by mouth 2 (two) times a day , Disp: , Rfl:     spironolactone (ALDACTONE) 25 mg tablet, Take 1 tablet by mouth daily, Disp: , Rfl:   No Known Allergies    Review of Systems:  Review of Systems   Constitutional: Negative    Negative for activity change, appetite change, chills, diaphoresis, fatigue, fever and unexpected weight change  HENT: Negative  Negative for congestion, dental problem, drooling, ear discharge, ear pain, facial swelling, hearing loss, mouth sores, nosebleeds, postnasal drip, rhinorrhea, sinus pain, sinus pressure, sneezing, sore throat, tinnitus, trouble swallowing and voice change  Eyes: Negative  Negative for photophobia, pain, redness, itching and visual disturbance  Respiratory: Negative  Negative for apnea, cough, choking, chest tightness, shortness of breath, wheezing and stridor  Cardiovascular: Positive for leg swelling  Negative for chest pain and palpitations  Gastrointestinal: Negative  Negative for abdominal distention, abdominal pain, anal bleeding, blood in stool, constipation, diarrhea, nausea, rectal pain and vomiting  Endocrine: Negative  Negative for cold intolerance, heat intolerance, polydipsia, polyphagia and polyuria  Genitourinary: Negative  Negative for decreased urine volume, difficulty urinating, dyspareunia, dysuria, enuresis, flank pain, frequency, genital sores, hematuria, menstrual problem, pelvic pain, urgency, vaginal bleeding, vaginal discharge and vaginal pain  Musculoskeletal: Positive for back pain  Negative for arthralgias, gait problem, joint swelling, myalgias, neck pain and neck stiffness  Skin: Negative  Negative for color change, pallor, rash and wound  Allergic/Immunologic: Negative  Negative for environmental allergies, food allergies and immunocompromised state  Neurological: Negative  Negative for dizziness, tremors, seizures, syncope, facial asymmetry, speech difficulty, weakness, light-headedness, numbness and headaches  Hematological: Negative  Negative for adenopathy  Does not bruise/bleed easily  Psychiatric/Behavioral: Negative  Negative for agitation, behavioral problems, confusion, decreased concentration, dysphoric mood, hallucinations, self-injury, sleep disturbance and suicidal ideas   The patient is not nervous/anxious and is not hyperactive  All other systems reviewed and are negative  Vitals:    03/01/18 1253   BP: 120/60   BP Location: Right arm   Patient Position: Sitting   Cuff Size: Large   Pulse: 65   SpO2: 97%   Weight: 94 3 kg (208 lb)   Height: 5' 1" (1 549 m)     Physical Exam:  Physical Exam   Constitutional: She is oriented to person, place, and time  No distress  HENT:   Head: Normocephalic and atraumatic  Right Ear: External ear normal    Left Ear: External ear normal    Eyes: Conjunctivae are normal  Pupils are equal, round, and reactive to light  Right eye exhibits no discharge  Left eye exhibits no discharge  No scleral icterus  Neck: Normal range of motion  Neck supple  No JVD present  No tracheal deviation present  No thyromegaly present  Cardiovascular: Normal rate and regular rhythm  Exam reveals gallop  Exam reveals no friction rub  No murmur heard  Pulmonary/Chest: Effort normal and breath sounds normal  No stridor  No respiratory distress  She has no wheezes  She has no rales  She exhibits no tenderness  Abdominal: Soft  Bowel sounds are normal  She exhibits no distension and no mass  There is no tenderness  There is no rebound and no guarding  Musculoskeletal: Normal range of motion  She exhibits edema  She exhibits no tenderness or deformity  Neurological: She is alert and oriented to person, place, and time  She has normal reflexes  No cranial nerve deficit  She exhibits normal muscle tone  Coordination normal    Skin: Skin is warm and dry  No rash noted  She is not diaphoretic  No erythema  No pallor  Psychiatric: She has a normal mood and affect  Her behavior is normal  Judgment and thought content normal    Nursing note and vitals reviewed        Labs:     Lab Results   Component Value Date    WBC 6 61 02/19/2018    HGB 11 5 02/19/2018    HCT 35 8 02/19/2018    MCV 88 02/19/2018     02/19/2018     Lab Results   Component Value Date     02/19/2018    K 4 2 2018     (H) 2018    CO2 25 2018    ANIONGAP 8 2018    BUN 35 (H) 2018    CREATININE 1 05 2018    GLUCOSE 100 2017    GLUF 106 (H) 2018    CALCIUM 9 2 2018    AST 16 2018    ALT 20 2018    ALKPHOS 118 (H) 2018    PROT 7 7 2018    BILITOT 0 36 2018    EGFR 51 2018     Lab Results   Component Value Date    CHOL 159 2018    CHOL 180 2017    CHOL 220 (H) 05/15/2017     Lab Results   Component Value Date    HDL 41 2018    HDL 50 2017    HDL 51 05/15/2017     Lab Results   Component Value Date    LDLCALC 90 2018    LDLCALC 94 2017    LDLCALC 130 (H) 05/15/2017     Lab Results   Component Value Date    TRIG 140 2018    TRIG 182 (H) 2017    TRIG 194 (H) 05/15/2017     Lab Results   Component Value Date    HGBA1C 5 6 2017       Imaging & Testing   I have personally reviewed pertinent reports  EKG: Personally reviewed  Normal sinus rhythm no acute st/t wave    Cardiac testing:   Results for orders placed during the hospital encounter of 01/15/16   Echo complete with contrast if indicated    [facility] [address] [place] [address]  [phone]    Transthoracic Echocardiogram  2D, M-mode, Doppler, and Color Doppler    Study date:  15-Berny-2016    Patient: Danuta Mccauley  MR number: [id number]  Account number: [de-identified]  : 1940  Age: 67 years  Gender: Female  Status: Routine  Location: Echo lab  Height: 61 in  Weight: 214 5 lb  BP: 132/ 84 mmHg    Indications: HTN    Diagnoses: 401 9 - HYPERTENSION NOS    Sonographer:  Edgardo Mercer  Primary Physician:  Chalo Leonardo  Referring Physician:  Joyce Salaser:  Radha Al  Interpreting Physician:  Trace Orellana DO    SUMMARY    LEFT VENTRICLE:  Systolic function was at the lower limits of normal  Ejection fraction was  estimated in the range of 50 % to 55 %   There were no regional wall motion abnormalities  There was moderate concentric hypertrophy  Features were consistent with a pseudonormal left ventricular filling pattern,  with concomitant abnormal relaxation and increased filling pressure (grade 2  diastolic dysfunction)  Doppler parameters were consistent with elevated mean  left atrial filling pressure  LEFT ATRIUM:  The atrium was moderately dilated  RIGHT ATRIUM:  The atrium was markedly dilated  MITRAL VALVE:  There was marked annular calcification  There was moderate regurgitation  TRICUSPID VALVE:  There was mild regurgitation  Pulmonary artery systolic pressure was mildly increased  Estimated peak PA pressure was 46 mmHg  HISTORY: PRIOR HISTORY: Patient has no history of cardiovascular disease  PROCEDURE: The procedure was performed in the echo lab  This was a routine  study  The transthoracic approach was used  The study included complete 2D  imaging, M-mode, complete spectral Doppler, and color Doppler  The heart rate  was 77 bpm, at the start of the study  Echocardiographic views were limited due  to poor acoustic window availability and decreased penetration  This was a  technically difficult study  LEFT VENTRICLE: Size was normal  Systolic function was at the lower limits of  normal  Ejection fraction was estimated in the range of 50 % to 55 %  There  were no regional wall motion abnormalities  There was moderate concentric  hypertrophy  DOPPLER: Features were consistent with a pseudonormal left  ventricular filling pattern, with concomitant abnormal relaxation and increased  filling pressure (grade 2 diastolic dysfunction)  Doppler parameters were  consistent with elevated mean left atrial filling pressure  RIGHT VENTRICLE: The size was normal  Systolic function was normal  DOPPLER:  Systolic pressure was within the normal range  LEFT ATRIUM: The atrium was moderately dilated  No thrombus was identified      RIGHT ATRIUM: The atrium was markedly dilated  MITRAL VALVE: There was marked annular calcification  Valve structure was  normal  There was normal leaflet separation  No echocardiographic evidence for  prolapse  DOPPLER: The transmitral velocity was within the normal range  There  was no evidence for stenosis  There was moderate regurgitation  AORTIC VALVE: The valve was trileaflet  Leaflets exhibited normal thickness,  normal cuspal separation, and sclerosis  DOPPLER: Transaortic velocity was  within the normal range  There was no evidence for stenosis  There was no  regurgitation  TRICUSPID VALVE: The valve structure was normal  There was normal leaflet  separation  DOPPLER: The transtricuspid velocity was within the normal range  There was mild regurgitation  Pulmonary artery systolic pressure was mildly  increased  Estimated peak PA pressure was 46 mmHg  PULMONIC VALVE: Leaflets exhibited normal thickness, no calcification, and  normal cuspal separation  DOPPLER: The transpulmonic velocity was within the  normal range  There was mild regurgitation  PERICARDIUM: There was no thickening  There was no pericardial effusion  AORTA: The root exhibited normal size  PULMONARY ARTERY: The size was normal  The morphology appeared normal     SYSTEM MEASUREMENT TABLES    2D mode  AoR Diam 2D: 2 8 cm  LA Diam (2D): 5 3 cm  LA/Ao (2D): 1 89  FS (2D Teich): 25 3 %  IVSd (2D): 1 44 cm  LVDEV: 98 3 cm³  LVESV: 49 1 cm³  LVIDd(2D): 4 62 cm  LVISd (2D): 3 45 cm  LVPWd (2D): 1 3 cm  SV (Teich): 49 2 cm³    Apical four chamber  LVEF A4C: 55 %    Unspecified Scan Mode  MV Peak A Jesse: 1110 mm/s  MV Peak E Jesse   Mean: 1400 mm/s  MVA (PHT): 3 55 cm squared  PHT: 58 ms  Max P mm[Hg]  V Max: 2990 mm/s  Vmax: 3050 mm/s  RA Area: 19 6 cm squared  RA Volume: 55 3 cm³  TAPSE: 1 9 cm    [facility]    Prepared and electronically signed by    MarjorieDO Leonard Benitez 16-Jan-2016 17:37:47       No results found for this or any previous visit  Navi Alaniz  Please call with any questions or suggestions    A description of the counseling:   Goals and Barriers:  Patient's ability to self care:  Medication side effect reviewed with patient in detail and all their questions answered  "This note has been constructed using a voice recognition system  Therefore there may be syntax, spelling, and/or grammatical errors   Please call if you have any questions  "

## 2018-03-07 NOTE — PROCEDURES
Procedure      Pre-procedure Diagnosis: Lumbar disc herniation with radiculopathy  Post-procedure Diagnosis: Lumbar disc herniation with radiculopathy  Procedure Title(s):  1  Right L4 transforaminal epidural steroid injection 2  Right L5 transforaminal epidural steroid injection  Attending Surgeon:   Lukas Helms MD  Anesthesia:   Local     Indications: The patient is a 65year-old female with a diagnosis of lumbar disc herniation with radiculopathy  The patient's history and physical exam were reviewed  The risks, benefits and alternatives to the procedure were discussed, and all questions were answered to the patient's satisfaction  The patient agreed to proceed, and written informed consent was obtained  Procedure in Detail: The patient was brought into the procedure room and placed in the prone position on the fluoroscopy table  The area of the lumbar spine was prepped with chloraprep solution then draped in a sterile manner  The L4 vertebral body was identified with AP fluoroscopy  An oblique view to the right was obtained to better visualize the inferior junction of the pedicle and transverse process  The 6 o'clock position of the pedicle was marked and identified  The skin and subcutaneous tissues in the area were anesthetized with 1% lidocaine  A 22-gauge, 5 inch needle was directed toward the targeted point under fluoroscopy until bone was contacted  The needle was then walked inferiorly until the neural foramen was entered  A lateral fluoroscopic view was then used to place the needle tip at the 10 o'clock position of the foramen  The same procedure was repeated for the right L5 level    Negative aspiration was confirmed, and 1 ml Omnipaque 300 was injected at each level  Appropriate neurograms were observed under AP fluoroscopy  Digital subtraction angiography was performed showing no vascular uptake and appropriate spread in the epidural space   Then, after negative aspiration, a solution consisting of 1 mL 0 25% bupivacaine and 0 5 mL depo-medrol (80mg/mL) was easily injected at each level  The needles were removed with a 1% lidocaine flush  The patient's back was cleaned and a bandage was placed over the needle insertion points  Disposition: The patient tolerated the procedure well, and there were no apparent complications  The patient was taken to the recovery area where written discharge instructions for the procedure were given             Signatures   Electronically signed by : Renee Flores MD; Dec 12 2017  9:13AM EST                       (Author)

## 2018-03-12 ENCOUNTER — OFFICE VISIT (OUTPATIENT)
Dept: NEPHROLOGY | Facility: CLINIC | Age: 78
End: 2018-03-12
Payer: MEDICARE

## 2018-03-12 VITALS
HEIGHT: 61 IN | BODY MASS INDEX: 40.59 KG/M2 | WEIGHT: 215 LBS | SYSTOLIC BLOOD PRESSURE: 134 MMHG | DIASTOLIC BLOOD PRESSURE: 52 MMHG

## 2018-03-12 DIAGNOSIS — I10 BENIGN ESSENTIAL HYPERTENSION: ICD-10-CM

## 2018-03-12 DIAGNOSIS — R74.8 ALKALINE PHOSPHATASE ELEVATION: ICD-10-CM

## 2018-03-12 DIAGNOSIS — N18.30 CKD (CHRONIC KIDNEY DISEASE), STAGE III (HCC): Primary | ICD-10-CM

## 2018-03-12 PROCEDURE — 99213 OFFICE O/P EST LOW 20 MIN: CPT | Performed by: INTERNAL MEDICINE

## 2018-03-12 NOTE — PROGRESS NOTES
NEPHROLOGY OFFICE VISIT   Donna Fine 65 y o  female MRN: [id number]  3/12/2018    Reason for Visit: CKD    ASSESSMENT and PLAN:    I had the pleasure of seeing Ms Christine Hassan today in the renal clinic for the continued management of CKD III  Since our last visit, there has been an ER visit for elbow pain in January  Pt also completed colonoscopy in February  He currently has no complaints at this time and is feeling well  Patient denies any chest pain, shortness of breath and swelling  The last blood work was done on february, which we have reviewed together  62-year-old female with a past medical history of chronic kidney disease, venous stasis, HTN, hypothyroidism, lumbar canal stenosis, Anxiety, GERD, neuropathy, Gout, EF 67-27%, Grade 2 diastolic dysfunction who presents for follow up for CKD  To note, patient's  has parkinson late state per patient  1) CKD - Prior year had nl Cr prior to aug 2016 and then had SHABANA during diarrhea episode  To note, patient also has a long standing history of HTN  Creatinine in 2013, 0 9 mg/dL  Cr early 2016 was 0 80-0 9 mg/dL; then increased starting in august 2016 to 2 3 mg/dL and has fluctuated since   CAT scan in 2016, kidneys appearing normal at that time  UA in august was bland  Etiology of CKD may be long standing HTN and ATN  Baseline Cr ~ 1 1 -1 3 mg/dL    - Urine eos 0%;   - A1c 5 6% in nov  - UA july - small leuk/2-4 WBC;   - Cr in July - improving to 1 24 mg/dL  - C4 nl 38; C3 nl 142  - UPCR is stable 0 16  On ARB  - Renal u/s with R 10 3 cm, L 10 4 cm, renal cortex 1 cm b/l; both kidneys slightly reduced in size; lower pole of R kidney is non obstructing calculus  - Pt follows with Urology team for nephrolithiasis     - No NSAIDs, the patient is not currently taking NSAID  - most recent Cr stable at 1 05 mg/dL   - labwork in 4 months, appt thereafter  - continue ARB  - take extra dose of 1 mg bumetanide today (pt takes extra dose approx 1-2 X/week if needed); had ham yesterday    2) Volume - follows with Cardiology; has sign of edema today  - pt on bumetanide 1 mg once daily  - weight is increased today and edema - pt will take 2 mg bumetanide today  - pt self doses extra dose if needed appropriately    3) HTN -     -orthostasis has improved  - continue amlodipine/olmesartan; bumex, spironolactone  - on propranolol    4) hypothyroid - as per Primary Care Physician    5) HPL - Primary Care following  6) Electrolytes - stable    7) MBD -  and repeat is 83 8    - Ca is normal   - Phos - appropriate 2 9  - Vit D appropriate 40    8) gout -     - on allopurinol    9) back pain - follows with Pain management    10) Colonoscopy in feb with internal hemorrhoids and polyp removed  11) alk phos elevation    - recheck with next labs  - was started on allopurinol since last check when was normal  Could be related to allopurinol    12) Anemia - Hb stable    It was a pleasure evaluating Ms Umm Kumar in the renal office  Thank you for Allowing her to participate in the care of your patient  Please do not hesitate to contact our team if further issues or questions arise in the interim  No problem-specific Assessment & Plan notes found for this encounter  HPI:    Patient seen for follow-up visit  Denies new complaints  Has joint pains which is unchanged   Parkinson's disease is progressing  PATIENT INSTRUCTIONS:    Patient Instructions   1) take extra 1 mg bumex today (extra 1/2 tablet)  2) labwork in 4 months        OBJECTIVE:  Current Weight: Weight - Scale: 97 5 kg (215 lb)  Vitals:    03/12/18 1245   BP: 134/52   BP Location: Right arm   Patient Position: Sitting   Weight: 97 5 kg (215 lb)   Height: 5' 1" (1 549 m)    Body mass index is 40 62 kg/m²  REVIEW OF SYSTEMS:    Review of Systems   Constitutional: Negative  Negative for fatigue  HENT: Negative  Eyes: Negative  Respiratory: Positive for shortness of breath  Cardiovascular: Positive for leg swelling  Gastrointestinal: Negative  Endocrine: Negative  Genitourinary: Negative  Negative for difficulty urinating  Musculoskeletal: Positive for arthralgias  Skin: Negative  Allergic/Immunologic: Negative  Neurological: Negative  Hematological: Negative  Psychiatric/Behavioral: Negative  All other systems reviewed and are negative  PHYSICAL EXAM:      Physical Exam   Constitutional: She is oriented to person, place, and time  She appears well-developed and well-nourished  No distress  HENT:   Head: Normocephalic and atraumatic  Mouth/Throat: No oropharyngeal exudate  Eyes: Conjunctivae are normal  Right eye exhibits no discharge  Left eye exhibits no discharge  No scleral icterus  Neck: Normal range of motion  Neck supple  No JVD present  Cardiovascular: Normal rate and regular rhythm  Exam reveals no gallop and no friction rub  No murmur heard  Pulmonary/Chest: Effort normal and breath sounds normal  No respiratory distress  She has no wheezes  She has no rales  Abdominal: Soft  Bowel sounds are normal  She exhibits no distension  There is no tenderness  There is no rebound  Musculoskeletal: Normal range of motion  She exhibits edema  She exhibits no tenderness or deformity  1-2+ LE edema  Lungs CTA  Neurological: She is alert and oriented to person, place, and time  Coordination normal    Skin: Skin is warm and dry  No rash noted  She is not diaphoretic  No erythema  No pallor  Psychiatric: She has a normal mood and affect  Her behavior is normal  Judgment and thought content normal    Nursing note and vitals reviewed        Medications:    Current Outpatient Prescriptions:     allopurinol (ZYLOPRIM) 300 mg tablet, Take 300 mg by mouth every morning  , Disp: , Rfl:     amlodipine-olmesartan (BLACK) 10-40 MG, Take 1 tablet by mouth daily, Disp: , Rfl:     aspirin 81 MG tablet, Take 81 mg by mouth every morning  , Disp: , Rfl:     bumetanide (BUMEX) 2 mg tablet, Take 2 mg by mouth every morning, Disp: , Rfl:     cholecalciferol (VITAMIN D3) 1,000 units tablet, Take 1 tablet by mouth daily, Disp: , Rfl:     clobetasol (TEMOVATE) 0 05 % ointment, Apply 1 application topically 2 (two) times a day , Disp: , Rfl:     diclofenac sodium (VOLTAREN) 1 %, Apply 2 g topically 4 (four) times a day, Disp: 100 g, Rfl: 0    gabapentin (NEURONTIN) 300 mg capsule, Take 100 mg by mouth daily at bedtime, Disp: , Rfl:     levothyroxine 100 mcg tablet, Take 1 tablet (100 mcg total) by mouth daily, Disp: 90 tablet, Rfl: 0    Omega-3-acid Ethyl Esters & D3 1 & 1000 GM & UNIT KIT, Take 2 capsules by mouth 2 (two) times a day, Disp: , Rfl:     omeprazole (PriLOSEC) 20 mg delayed release capsule, Take 20 mg by mouth every morning  , Disp: , Rfl:     propranolol (INDERAL) 80 mg tablet, Take 80 mg by mouth 2 (two) times a day , Disp: , Rfl:     spironolactone (ALDACTONE) 25 mg tablet, Take 1 tablet by mouth daily, Disp: , Rfl:     Laboratory Results:        Results for orders placed or performed in visit on 02/19/18   Lipid Panel with Direct LDL reflex   Result Value Ref Range    Cholesterol 159 50 - 200 mg/dL    Triglycerides 140 <=150 mg/dL    HDL, Direct 41 40 - 60 mg/dL    LDL Calculated 90 0 - 100 mg/dL   Lipoprotein A (LPA)   Result Value Ref Range    Lipoprotein (a) 35 <75 nmol/L   Protein / creatinine ratio, urine   Result Value Ref Range    Creatinine, Ur 129 0 mg/dL    Protein Urine Random 20 mg/dL    Prot/Creat Ratio, Ur 0 16 (H) 0 00 - 0 10   Phosphorus   Result Value Ref Range    Phosphorus 2 9 2 3 - 4 1 mg/dL   PTH, intact   Result Value Ref Range    PTH 83 8 (H) 14 0 - 72 0 pg/mL   CBC and differential   Result Value Ref Range    WBC 6 61 4 31 - 10 16 Thousand/uL    RBC 4 08 3 81 - 5 12 Million/uL    Hemoglobin 11 5 11 5 - 15 4 g/dL    Hematocrit 35 8 34 8 - 46 1 %    MCV 88 82 - 98 fL    MCH 28 2 26 8 - 34 3 pg    MCHC 32 1 31 4 - 37 4 g/dL    RDW 16 1 (H) 11 6 - 15 1 %    MPV 11 4 8 9 - 12 7 fL    Platelets 754 765 - 070 Thousands/uL    nRBC 0 /100 WBCs    Neutrophils Relative 78 (H) 43 - 75 %    Lymphocytes Relative 13 (L) 14 - 44 %    Monocytes Relative 7 4 - 12 %    Eosinophils Relative 2 0 - 6 %    Basophils Relative 0 0 - 1 %    Neutrophils Absolute 5 06 1 85 - 7 62 Thousands/µL    Lymphocytes Absolute 0 86 0 60 - 4 47 Thousands/µL    Monocytes Absolute 0 49 0 17 - 1 22 Thousand/µL    Eosinophils Absolute 0 15 0 00 - 0 61 Thousand/µL    Basophils Absolute 0 02 0 00 - 0 10 Thousands/µL   Comprehensive metabolic panel   Result Value Ref Range    Sodium 142 136 - 145 mmol/L    Potassium 4 2 3 5 - 5 3 mmol/L    Chloride 109 (H) 100 - 108 mmol/L    CO2 25 21 - 32 mmol/L    Anion Gap 8 4 - 13 mmol/L    BUN 35 (H) 5 - 25 mg/dL    Creatinine 1 05 0 60 - 1 30 mg/dL    Glucose, Fasting 106 (H) 65 - 99 mg/dL    Calcium 9 2 8 3 - 10 1 mg/dL    AST 16 5 - 45 U/L    ALT 20 12 - 78 U/L    Alkaline Phosphatase 118 (H) 46 - 116 U/L    Total Protein 7 7 6 4 - 8 2 g/dL    Albumin 3 4 (L) 3 5 - 5 0 g/dL    Total Bilirubin 0 36 0 20 - 1 00 mg/dL    eGFR 51 ml/min/1 73sq m   Uric acid   Result Value Ref Range    Uric Acid 7 0 (H) 2 0 - 6 8 mg/dL

## 2018-03-12 NOTE — LETTER
March 12, 2018     Rere Flores MD  [address]  [facility] [address]    Patient: Sara Rivera   YOB: 1940   Date of Visit: 3/12/2018       Dear Dr Hutchins Dural:    Thank you for referring Keith Mitchell to me for evaluation  Below are my notes for this consultation  If you have questions, please do not hesitate to call me  I look forward to following your patient along with you  Sincerely,        Raj Nicolas MD        CC: No Recipients  Raj Nicolas MD  3/12/2018  1:10 PM  Sign at close encounter  [address] [facility] [place] 65 [place]  female MRN: [id number]  3/12/2018    Reason for Visit: CKD    ASSESSMENT and PLAN:    I had the pleasure of seeing Ms Feroz Verde today in the renal clinic for the continued management of CKD III  Since our last visit, there has been an ER visit for elbow pain in January  Pt also completed colonoscopy in February  He currently has no complaints at this time and is feeling well  Patient denies any chest pain, shortness of breath and swelling  The last blood work was done on february, which we have reviewed together  71-year-old female with a past medical history of chronic kidney disease, venous stasis, HTN, hypothyroidism, lumbar canal stenosis, Anxiety, GERD, neuropathy, Gout, EF 56-94%, Grade 2 diastolic dysfunction who presents for follow up for CKD  To note, patient's  has parkinson late state per patient  1) CKD - Prior year had nl Cr prior to aug 2016 and then had SHABANA during diarrhea episode  To note, patient also has a long standing history of HTN  Creatinine in 2013, 0 9 mg/dL  Cr early 2016 was 0 80-0 9 mg/dL; then increased starting in august 2016 to 2 3 mg/dL and has fluctuated since   CAT scan in 2016, kidneys appearing normal at that time  UA in august was bland  Etiology of CKD may be long standing HTN and ATN   Baseline Cr ~ 1 1 -1 3 mg/dL    - Urine eos 0%;   - A1c 5 6% in nov  - UA july - small leuk/2-4 WBC;   - Cr in July - improving to 1 24 mg/dL  - C4 nl 38; C3 nl 142  - UPCR is stable 0 16  On ARB  - Renal u/s with R 10 3 cm, L 10 4 cm, renal cortex 1 cm b/l; both kidneys slightly reduced in size; lower pole of R kidney is non obstructing calculus  - Pt follows with Urology team for nephrolithiasis  - No NSAIDs, the patient is not currently taking NSAID  - most recent Cr stable at 1 05 mg/dL   - labwork in 4 months, appt thereafter  - continue ARB  - take extra dose of 1 mg bumetanide today (pt takes extra dose approx 1-2 X/week if needed); had ham yesterday    2) Volume - follows with Cardiology; has sign of edema today  - pt on bumetanide 1 mg once daily  - weight is increased today and edema - pt will take 2 mg bumetanide today  - pt self doses extra dose if needed appropriately    3) HTN -     -orthostasis has improved  - continue amlodipine/olmesartan; bumex, spironolactone  - on propranolol    4) hypothyroid - as per Primary Care Physician    5) HPL - Primary Care following  6) Electrolytes - stable    7) MBD -  and repeat is 83 8    - Ca is normal   - Phos - appropriate 2 9  - Vit D appropriate 40    8) gout -     - on allopurinol    9) back pain - follows with Pain management    10) Colonoscopy in feb with internal hemorrhoids and polyp removed  11) alk phos elevation    - recheck with next labs  - was started on allopurinol since last check when was normal  Could be related to allopurinol    12) Anemia - Hb stable    It was a pleasure evaluating Ms Lili Ulrich in the renal office  Thank you for Allowing her to participate in the care of your patient  Please do not hesitate to contact our team if further issues or questions arise in the interim  No problem-specific Assessment & Plan notes found for this encounter  HPI:    Patient seen for follow-up visit  Denies new complaints  Has joint pains which is unchanged     Parkinson's disease is progressing  PATIENT INSTRUCTIONS:    Patient Instructions   1) take extra 1 mg bumex today (extra 1/2 tablet)  2) labwork in 4 months        OBJECTIVE:  Current Weight: Weight - Scale: 97 5 kg (215 lb)  Vitals:    03/12/18 1245   BP: 134/52   BP Location: Right arm   Patient Position: Sitting   Weight: 97 5 kg (215 lb)   Height: 5' 1" (1 549 m)    Body mass index is 40 62 kg/m²  REVIEW OF SYSTEMS:    Review of Systems   Constitutional: Negative  Negative for fatigue  HENT: Negative  Eyes: Negative  Respiratory: Positive for shortness of breath  Cardiovascular: Positive for leg swelling  Gastrointestinal: Negative  Endocrine: Negative  Genitourinary: Negative  Negative for difficulty urinating  Musculoskeletal: Positive for arthralgias  Skin: Negative  Allergic/Immunologic: Negative  Neurological: Negative  Hematological: Negative  Psychiatric/Behavioral: Negative  All other systems reviewed and are negative  PHYSICAL EXAM:      Physical Exam   Constitutional: She is oriented to person, place, and time  She appears well-developed and well-nourished  No distress  HENT:   Head: Normocephalic and atraumatic  Mouth/Throat: No oropharyngeal exudate  Eyes: Conjunctivae are normal  Right eye exhibits no discharge  Left eye exhibits no discharge  No scleral icterus  Neck: Normal range of motion  Neck supple  No JVD present  Cardiovascular: Normal rate and regular rhythm  Exam reveals no gallop and no friction rub  No murmur heard  Pulmonary/Chest: Effort normal and breath sounds normal  No respiratory distress  She has no wheezes  She has no rales  Abdominal: Soft  Bowel sounds are normal  She exhibits no distension  There is no tenderness  There is no rebound  Musculoskeletal: Normal range of motion  She exhibits edema  She exhibits no tenderness or deformity  1-2+ LE edema  Lungs CTA      Neurological: She is alert and oriented to person, place, and time  Coordination normal    Skin: Skin is warm and dry  No rash noted  She is not diaphoretic  No erythema  No pallor  Psychiatric: She has a normal mood and affect  Her behavior is normal  Judgment and thought content normal    Nursing note and vitals reviewed        Medications:    Current Outpatient Prescriptions:     allopurinol (ZYLOPRIM) 300 mg tablet, Take 300 mg by mouth every morning  , Disp: , Rfl:     amlodipine-olmesartan (BLACK) 10-40 MG, Take 1 tablet by mouth daily, Disp: , Rfl:     aspirin 81 MG tablet, Take 81 mg by mouth every morning  , Disp: , Rfl:     bumetanide (BUMEX) 2 mg tablet, Take 2 mg by mouth every morning, Disp: , Rfl:     cholecalciferol (VITAMIN D3) 1,000 units tablet, Take 1 tablet by mouth daily, Disp: , Rfl:     clobetasol (TEMOVATE) 0 05 % ointment, Apply 1 application topically 2 (two) times a day , Disp: , Rfl:     diclofenac sodium (VOLTAREN) 1 %, Apply 2 g topically 4 (four) times a day, Disp: 100 g, Rfl: 0    gabapentin (NEURONTIN) 300 mg capsule, Take 100 mg by mouth daily at bedtime, Disp: , Rfl:     levothyroxine 100 mcg tablet, Take 1 tablet (100 mcg total) by mouth daily, Disp: 90 tablet, Rfl: 0    Omega-3-acid Ethyl Esters & D3 1 & 1000 GM & UNIT KIT, Take 2 capsules by mouth 2 (two) times a day, Disp: , Rfl:     omeprazole (PriLOSEC) 20 mg delayed release capsule, Take 20 mg by mouth every morning  , Disp: , Rfl:     propranolol (INDERAL) 80 mg tablet, Take 80 mg by mouth 2 (two) times a day , Disp: , Rfl:     spironolactone (ALDACTONE) 25 mg tablet, Take 1 tablet by mouth daily, Disp: , Rfl:     Laboratory Results:        Results for orders placed or performed in visit on 02/19/18   Lipid Panel with Direct LDL reflex   Result Value Ref Range    Cholesterol 159 50 - 200 mg/dL    Triglycerides 140 <=150 mg/dL    HDL, Direct 41 40 - 60 mg/dL    LDL Calculated 90 0 - 100 mg/dL   Lipoprotein A (LPA)   Result Value Ref Range    Lipoprotein (a) 35 <75 nmol/L Protein / creatinine ratio, urine   Result Value Ref Range    Creatinine, Ur 129 0 mg/dL    Protein Urine Random 20 mg/dL    Prot/Creat Ratio, Ur 0 16 (H) 0 00 - 0 10   Phosphorus   Result Value Ref Range    Phosphorus 2 9 2 3 - 4 1 mg/dL   PTH, intact   Result Value Ref Range    PTH 83 8 (H) 14 0 - 72 0 pg/mL   CBC and differential   Result Value Ref Range    WBC 6 61 4 31 - 10 16 Thousand/uL    RBC 4 08 3 81 - 5 12 Million/uL    Hemoglobin 11 5 11 5 - 15 4 g/dL    Hematocrit 35 8 34 8 - 46 1 %    MCV 88 82 - 98 fL    MCH 28 2 26 8 - 34 3 pg    MCHC 32 1 31 4 - 37 4 g/dL    RDW 16 1 (H) 11 6 - 15 1 %    MPV 11 4 8 9 - 12 7 fL    Platelets 357 374 - 322 Thousands/uL    nRBC 0 /100 WBCs    Neutrophils Relative 78 (H) 43 - 75 %    Lymphocytes Relative 13 (L) 14 - 44 %    Monocytes Relative 7 4 - 12 %    Eosinophils Relative 2 0 - 6 %    Basophils Relative 0 0 - 1 %    Neutrophils Absolute 5 06 1 85 - 7 62 Thousands/µL    Lymphocytes Absolute 0 86 0 60 - 4 47 Thousands/µL    Monocytes Absolute 0 49 0 17 - 1 22 Thousand/µL    Eosinophils Absolute 0 15 0 00 - 0 61 Thousand/µL    Basophils Absolute 0 02 0 00 - 0 10 Thousands/µL   Comprehensive metabolic panel   Result Value Ref Range    Sodium 142 136 - 145 mmol/L    Potassium 4 2 3 5 - 5 3 mmol/L    Chloride 109 (H) 100 - 108 mmol/L    CO2 25 21 - 32 mmol/L    Anion Gap 8 4 - 13 mmol/L    BUN 35 (H) 5 - 25 mg/dL    Creatinine 1 05 0 60 - 1 30 mg/dL    Glucose, Fasting 106 (H) 65 - 99 mg/dL    Calcium 9 2 8 3 - 10 1 mg/dL    AST 16 5 - 45 U/L    ALT 20 12 - 78 U/L    Alkaline Phosphatase 118 (H) 46 - 116 U/L    Total Protein 7 7 6 4 - 8 2 g/dL    Albumin 3 4 (L) 3 5 - 5 0 g/dL    Total Bilirubin 0 36 0 20 - 1 00 mg/dL    eGFR 51 ml/min/1 73sq m   Uric acid   Result Value Ref Range    Uric Acid 7 0 (H) 2 0 - 6 8 mg/dL

## 2018-03-13 ENCOUNTER — OFFICE VISIT (OUTPATIENT)
Dept: PODIATRY | Facility: CLINIC | Age: 78
End: 2018-03-13
Payer: MEDICARE

## 2018-03-13 VITALS
BODY MASS INDEX: 40.59 KG/M2 | HEART RATE: 65 BPM | DIASTOLIC BLOOD PRESSURE: 68 MMHG | HEIGHT: 61 IN | WEIGHT: 215 LBS | SYSTOLIC BLOOD PRESSURE: 134 MMHG | RESPIRATION RATE: 16 BRPM

## 2018-03-13 DIAGNOSIS — I70.209 ATHEROSCLEROSIS OF ARTERIES OF EXTREMITIES (HCC): Primary | ICD-10-CM

## 2018-03-13 DIAGNOSIS — M79.672 PAIN IN BOTH FEET: ICD-10-CM

## 2018-03-13 DIAGNOSIS — M79.671 PAIN IN BOTH FEET: ICD-10-CM

## 2018-03-13 DIAGNOSIS — E11.42 DIABETIC POLYNEUROPATHY ASSOCIATED WITH TYPE 2 DIABETES MELLITUS (HCC): ICD-10-CM

## 2018-03-13 DIAGNOSIS — B35.1 ONYCHOMYCOSIS: ICD-10-CM

## 2018-03-13 PROCEDURE — 11721 DEBRIDE NAIL 6 OR MORE: CPT | Performed by: PODIATRIST

## 2018-03-15 PROBLEM — M79.671 PAIN IN BOTH FEET: Status: ACTIVE | Noted: 2018-03-15

## 2018-03-15 PROBLEM — E11.42 DIABETIC POLYNEUROPATHY ASSOCIATED WITH TYPE 2 DIABETES MELLITUS (HCC): Status: ACTIVE | Noted: 2018-03-15

## 2018-03-15 PROBLEM — M79.672 PAIN IN BOTH FEET: Status: ACTIVE | Noted: 2018-03-15

## 2018-03-15 NOTE — PROGRESS NOTES
Assessment/Plan:    No problem-specific Assessment & Plan notes found for this encounter  Discussion/Summary   The patient was counseled regarding instructions for management,-- patient and family education,-- importance of compliance with treatment  Patient is able to Self-Care  The treatment plan was reviewed with the patient/guardian  The patient/guardian understands and agrees with the treatment plan      Chief Complaint   feet care      History of Present Illness   HPI: Patient complains of pain in her feet with ambulation  She is pain around the big toe joints  There is no history of trauma  Today the patient is concerned with her swollen legs  She does not have pain in her legs, however, they are itchy and red  She suffers from edema  She does take water pill  Patient is now been diagnosed with renal compromise  Review of Systems        Constitutional: No fever, no chills, feels well, no tiredness, no recent weight gain or loss  Eyes: No complaints of eyesight problems, no red eyes  ENT: no loss of hearing, no nosebleeds, no sore throat  Cardiovascular: No complaints of chest pain, no palpitations, no leg claudication or lower extremity edema  Respiratory: no compliants of shortness of breath, no wheezing, no cough  Gastrointestinal: no complaints of abdominal pain, no constipation, no nausea or diarrhea, no vomiting, no bloody stools  Genitourinary: no complaints of dysuria, no incontinence  Musculoskeletal: arthralgias,-- limb pain-- and-- myalgias, but-- as noted in HPI  Integumentary: no complaints of skin rash or lesion, no itching or dry skin, no skin wounds  Neurological: numbness-- and-- tingling, but-- no complaints of headache, no confusion, no numbness or tingling, no dizziness-- and-- as noted in HPI  Endocrine: No complaints of muscle weakness, no feelings of weakness, no frequent urination, no excessive thirst-- and-- as noted in HPI  feelings of weakness      Psychiatric: No suicidal thoughts, no anxiety, no feelings of depression  Active Problems   1  Abdominal pain (789 00) (R10 9)   2  Acute bronchitis (466 0) (J20 9)   3  Acute on chronic diastolic congestive heart failure (428 33,428 0) (I50 33)   4  Ankle pain, unspecified laterality   5  Arthropathy of knee (716 96) (M17 10)   6  Atherosclerosis of arteries of extremities (440 20) (I70 209)   7  Benign essential hypertension (401 1) (I10)   8  Breast pain (611 71) (N64 4)   9  Bunion, unspecified laterality   10  Callus (700) (L84)   11  Cellulitis (682 9) (L03 90)   12  Chronic low back pain (724 2,338 29) (M54 5,G89 29)   13  Chronic reflux esophagitis (530 11) (K21 0)   14  Chronic venous insufficiency (459 81) (I87 2)   15  CKD (chronic kidney disease), stage III (585 3) (N18 3)   16  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)   17  Dehydration (276 51) (E86 0)   18  Dermatitis (692 9) (L30 9)   19  Difficulty in walking (719 7) (R26 2)   20  Discoloration of nail (703 8) (L60 8)   21  Diverticulitis of colon (562 11) (K57 32)   22  Dizziness (780 4) (R42)   23  Dyspnea on exertion (786 09) (R06 09)   24  Dystrophic nail (703 8) (L60 3)   25  Dysuria (788 1) (R30 0)   26  Edema (782 3) (R60 9)   27  Electrolyte or fluid disorder (276 9) (E87 8)   28  Elevated triglycerides with high cholesterol (272 2) (E78 2)   29  Encounter for screening for cardiovascular disorders (V81 2) (Z13 6)   30  Encounter for screening mammogram for breast cancer (V76 12) (Z12 31)   31  Esophageal reflux (530 81) (K21 9)   32  Flu vaccine need (V04 81) (Z23)   33  Flu vaccine need (V04 81) (Z23)   34  Gout (274 9) (M10 9)   35  Headache (784 0) (R51)   36  Hiatal hernia (553 3) (K44 9)   37  Hyperlipemia (272 4) (E78 5)   38  Hypertension (401 9) (I10)   39  Hyperuricemia (790 6) (E79 0)   40  Hypothyroidism (244 9) (E03 9)   41  Infectious diarrhea (009 2) (A09)   42   Knee pain (719 46) (M25 569)   43  Leg swelling (729 81) (M79 89)   44  Localized osteoarthritis of knees, bilateral (715 36) (M17 0)   45  Lumbar canal stenosis (724 02) (M48 061)   46  Lumbar disc herniation with radiculopathy (722 10) (M51 16)   47  Lumbar radiculopathy (724 4) (M54 16)   48  Lumbar stenosis with neurogenic claudication (724 03) (M48 062)   49  Lymphedema (457 1) (I89 0)   50  Medicare annual wellness visit, initial (V70 0) (Z00 00)   51  Medicare annual wellness visit, subsequent (V70 0) (Z00 00)   52  Morbid obesity with body mass index of 40 0-44 9 in adult (278 01,V85 41)      (E66 01,Z68 41)   53  Nails Onychauxis (703 8)   54  Nausea (787 02) (R11 0)   55  Need for pneumococcal vaccination (V03 82) (Z23)   56  Need for prophylactic vaccination and inoculation against influenza (V04 81) (Z23)   57  Need for shingles vaccine (V04 89) (Z23)   58  Nephrolithiasis (592 0) (N20 0)   59  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)   60  Obstructive sleep apnea (327 23) (G47 33)   61  Onychomycosis (110 1) (B35 1)   62  Orthopnea (786 02) (R06 01)   63  Osteoarthritis, localized, knee (715 36) (M17 10)   64  Osteoporosis screening (V82 81) (Z13 820)   65  Other specified anxiety disorders (300 09) (F41 8)   66  Other specified inflammatory spondylopathies, 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) (M46 80)   67  Overweight (278 02) (E66 3)   68  Pain in both feet (729 5) (M79 671,M79 672)   69  Pain in wrist joint (719 43) (M25 539)   70  Palpitations (785 1) (R00 2)   71  Pes planus, unspecified laterality (734) (M21 40)   72  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)   73  Pseudogout of left wrist (765 56,080 15) (M11 232)   74  Renal disorder (593 9) (N28 9)   75  Rupture of popliteal cyst (727 51) (M66 0)   76  Screening for colon cancer (V76 51) (Z12 11)   77  Screening for diabetes mellitus (DM) (V77 1) (Z13 1)   78  Screening for genitourinary condition (V81 6) (Z13 89)   79   Screening for malignant neoplasm of breast (V76 10) (Z12 31)   80  Short unilateral neuralgiform headache, conjunctival injection/tearing (339 05) (G44 059)   81  Somnolence, daytime (780 54) (R40 0)   82  Spinal stenosis of lumbar region (724 02) (M48 061)   83  Tarsal tunnel syndrome (355 5) (G57 50)   84  Tenderness in limb (729 5) (M79 609)   85  Tinea pedis (110 4) (B35 3)   86  Umbilical hernia (848 5) (K42 9)   87  Visit for screening mammogram (V76 12) (Z12 31)     Past Medical History    · Benign essential hypertension (401 1) (I10)   · Depression screen (V79 0) (Z13 89)   · Hiatal hernia (553 3) (K44 9)   · History of abdominal pain (V13 89) (V40 123)   · History of abdominal pain (V13 89) (U45 517)   · History of arthritis (V13 4) (Z87 39)   · History of backache (V13 59) (Z87 39)   · History of cataract (V12 49) (Z86 69)   · History of eczema (V13 3) (Z87 2)   · History of heart failure (V12 59) (Z86 79)   · History of hepatitis (V12 09) (Z86 19)   · History of onychomycosis (V12 09) (Z86 19)   · History of sleep apnea (V13 89) (Z86 69)   · History of Orthopnea (786 02) (R06 01)     The active problems and past medical history were reviewed and updated today  Surgical History    · History of Complete Colonoscopy   · History of Hysterectomy   · History of Incisional Hernia Repair - Incarcerated   · History of Knee Surgery     The surgical history was reviewed and updated today         Family History   Mother    · Family history of Coronary artery disease   · Family history of arthritis (V17 7) (Z82 61)   · Denied: Family history of depression   · Family history of diabetes mellitus (V18 0) (Z83 3)   · Family history of hypertension (V17 49) (Z82 49)   · Denied: Family history of substance abuse  Father    · Denied: Family history of depression   · Family history of hypertension (V17 49) (Z82 49)   · Denied: Family history of substance abuse  Sibling    · Denied: Family history of depression   · Denied: Family history of substance abuse  Family History    · Family history of arthritis (V17 7) (Z82 61)     The family history was reviewed and updated today  Social History    · Denied: History of Alcohol Use (History)   · Daily Coffee Consumption (___ Cups/Day)   · Lack of exercise (V69 0) (Z72 3)   ·    · Never a smoker   · Never a smoker   · Sleeps 6 -7 hours a day  The social history was reviewed and updated today  The social history was reviewed and is unchanged  Current Meds    1  Allopurinol 300 MG Oral Tablet; take one tablet by mouth every day; Therapy: 10GEA5233 to (Evaluate:37Xrh7025)  Requested for: 28RFF2496; Last     Rx:81Hpg7302 Ordered   2  Amlodipine-Olmesartan 5-20 MG Oral Tablet; TAKE 1 TABLET ONCE DAILY; Therapy: 17YUW5411 to (Evaluate:24Nov2017)  Requested for: 14Dal2384; Last     Rx:04Ttm3533 Ordered   3  Aspirin 81 MG Oral Tablet Chewable; Therapy: (Recorded:41Civ6418) to Recorded   4  Bumetanide 2 MG Oral Tablet; TAKE 1 TABLET DAILY; Therapy: 22MJH7391 to (Evaluate:66Yci5647)  Requested for: 01SSN2049; Last     Rx:50Drt8141 Ordered   5  Colchicine 0 6 MG Oral Tablet; TAKE 1 TABLET DAILY AS DIRECTED; Therapy: 27MFI9941 to (Evaluate:29Ldn6551)  Requested for: 89SOT9989; Last     Rx:95Ncw5686 Ordered   6  Gabapentin 300 MG Oral Capsule; TAKE 1 CAPSULE AT BEDTIME; Therapy: 75FSP3432 to (Evaluate:02Zhg8478)  Requested for: 66CZT6147; Last     Rx:25Uwb0353 Ordered   7  Levothyroxine Sodium 100 MCG Oral Tablet; 1 TAb PO Daily in AM;     Therapy: 33XLU6346 to (Last Rx:71Cle0567)  Requested for: 67BMF1247 Ordered   8  Omega-3-acid Ethyl Esters 1 GM Oral Capsule; take 2 capsules by mouth twice a day; Therapy: 27IFX0996 to (Evaluate:97Twz3439)  Requested for: 44Zyy1653; Last     Rx:07Gsy8589 Ordered   9  Omeprazole 20 MG Oral Capsule Delayed Release; TAKE 1 CAPSULE EVERY DAY AS     DIRECTED  BY  PHYSICIAN;      Therapy: 24QMG8390 to (66 79 49)  Requested for: 42VSV5843; Last Rx:29Xhk0438 Ordered   10  Propranolol HCl - 80 MG Oral Tablet; TAKE 1/2 TABLET EVERY 12 HOURS DAILY; Therapy: 11Aug2015 to (Last Rx:49Mwo0085)  Requested for: 40Fmo1412 Ordered   11  Spironolactone 25 MG Oral Tablet; TAKE 1 TABLET DAILY; Therapy: 82XQA0209 to Recorded   12  Vitamin D3 TABS; Take 1 tablet daily; Therapy: (Recorded:02Jun2017) to Recorded   13  Zoster (Zostavax); INJECT 0 5  ML Subcutaneous; Therapy: 71MLE2837 to (Last Rx:07Cys3972) Ordered     The medication list was reviewed and updated today  Allergies   1  No Known Drug Allergies     Physical Exam   Left Foot: Appearance: Normal except as noted: excessive pronation-- and-- pes planus  Right Foot: Appearance: Normal except as noted: excessive pronation-- and-- pes planus  Tenderness: None except the calcaneous,-- medial calcaneous-- and-- insertion of the plantar fascia  Left Ankle: Appearance: Normal except ecchymosis-- and-- swelling laterally  ROM: limited ROM in all planes    Right Ankle: ROM: limited ROM in all planes Motor: diffuse weakness  Neurological Exam: performed  Light touch was intact bilaterally  Vibratory sensation was intact bilaterally  Response to monofilament test was intact bilaterally  Deep tendon reflexes: patellar reflex present bilaterally-- and-- achilles reflex present bilaterally  Vascular Exam: performed Dorsalis pedis pulses were 1/4 bilaterally  Posterior tibial pulses were 1/4 bilaterally  Elevation Pallor: diminished bilaterally  Capillary refill time was greater than 3 seconds bilaterally-- and-- Q9 findings bilateral  Negative digital hair noted  Positive abnormal cooling bilateral  Edema: moderate bilaterally-- and-- 6/7 pitting edema  Negative Homans sign  Toenails: All of the toenails were elongated,-- hypertrophied,-- discolored-- and-- Mycotic with onychogryphosis  Note is made of bilateral tinea pedis in moccasin foot  Distribution           Socks and shoes removed, Right Foot Findings: swollen, erythematous and dry  The sensory exam showed diminished vibratory sensation at the level of the toes  Diminished tactile sensation with monofilament testing throughout the right foot  Socks and shoes removed, Left Foot Findings: swollen, erythematous and dry  The sensory exam showed diminished vibratory sensation at the level of the toes  Diminished tactile sensation with monofilament testing throughout the left foot  Capillary refills findings on the right were delayed in the toes  Pulses:      1+ in the posterior tibialis on the right      1+ in the dorsalis pedis on the right  Capillary refills findings on the left were delayed in the toes  Pulses:      1+ in the posterior tibialis on the left      1+ in the dorsalis pedis on the left  Assign Risk Category: 2: Loss of protective sensation with or without weakness, deformity, callus, pre-ulcer, or history of ulceration  High risk  Hyperkeratosis: present on both first toes,-- present on both first sub metatarsals-- and-- Bilateral plantar moccasin tinea pedis noted  Shoe Gear Evaluation: performed ()  Recommendation(s): SAS style  Procedure   All mycotic nails debrided today  Bilateral pre-ulcerative lesions debrided today  Procedures performed without pain or complication           There are no diagnoses linked to this encounter  Subjective:      Patient ID: Yoko Price is a 65 y o  female  HPI    The following portions of the patient's history were reviewed and updated as appropriate: allergies, current medications, past family history, past medical history, past social history, past surgical history and problem list     Review of Systems      Objective:      Foot Exam    Right Foot/Ankle     Inspection and Palpation  Skin Exam: callus and dry skin;     Neurovascular  Dorsalis pedis: 1+  Posterior tibial: 1+      Left Foot/Ankle      Inspection and Palpation  Skin Exam: callus and dry skin;     Neurovascular  Dorsalis pedis: 1+  Posterior tibial: 1+        Physical Exam   Cardiovascular: Pulses are weak pulses  Pulses:       Dorsalis pedis pulses are 1+ on the right side, and 1+ on the left side  Posterior tibial pulses are 1+ on the right side, and 1+ on the left side  Feet:   Right Foot:   Skin Integrity: Positive for callus and dry skin  Left Foot:   Skin Integrity: Positive for callus and dry skin  Patient's shoes and socks removed  Right Foot/Ankle   Right Foot Inspection  Skin Exam: dry skin, callus and callus                          Toe Exam: swelling and erythema  Sensory   Vibration: diminished  Proprioception: diminished   Monofilament testing: diminished  Vascular  Capillary refills: < 3 seconds  The right DP pulse is 1+  The right PT pulse is 1+  Left Foot/Ankle  Left Foot Inspection  Skin Exam: dry skin and callus                         Toe Exam: swelling and erythema                   Sensory   Vibration: diminished  Proprioception: diminished  Monofilament: diminished  Vascular  Capillary refills: < 3 seconds  The left DP pulse is 1+  The left PT pulse is 1+  Assign Risk Category:  Deformity present;  Loss of protective sensation; Weak pulses       Risk: 2

## 2018-05-22 ENCOUNTER — OFFICE VISIT (OUTPATIENT)
Dept: PODIATRY | Facility: CLINIC | Age: 78
End: 2018-05-22
Payer: MEDICARE

## 2018-05-22 DIAGNOSIS — I70.209 ATHEROSCLEROSIS OF ARTERIES OF EXTREMITIES (HCC): Primary | ICD-10-CM

## 2018-05-22 DIAGNOSIS — M79.671 PAIN IN BOTH FEET: ICD-10-CM

## 2018-05-22 DIAGNOSIS — E11.42 DIABETIC POLYNEUROPATHY ASSOCIATED WITH TYPE 2 DIABETES MELLITUS (HCC): ICD-10-CM

## 2018-05-22 DIAGNOSIS — M79.672 PAIN IN BOTH FEET: ICD-10-CM

## 2018-05-22 DIAGNOSIS — B35.1 ONYCHOMYCOSIS: ICD-10-CM

## 2018-05-22 PROCEDURE — 11721 DEBRIDE NAIL 6 OR MORE: CPT | Performed by: PODIATRIST

## 2018-05-23 NOTE — PROGRESS NOTES
Procedures   Foot Exam         No problem-specific Assessment & Plan notes found for this encounter  Discussion/Summary   The patient was counseled regarding instructions for management,-- patient and family education,-- importance of compliance with treatment     Patient is able to Self-Care     The treatment plan was reviewed with the patient/guardian  The patient/guardian understands and agrees with the treatment plan      Chief Complaint   feet care      History of Present Illness   HPI: Patient complains of pain in her feet with ambulation  She is pain around the big toe joints  There is no history of trauma  Today the patient is concerned with her swollen legs  She does not have pain in her legs, however, they are itchy and red  She suffers from edema  She does take water pill  Patient is now been diagnosed with renal compromise       Review of Systems        Constitutional: No fever, no chills, feels well, no tiredness, no recent weight gain or loss       Eyes: No complaints of eyesight problems, no red eyes       ENT: no loss of hearing, no nosebleeds, no sore throat       Cardiovascular: No complaints of chest pain, no palpitations, no leg claudication or lower extremity edema       Respiratory: no compliants of shortness of breath, no wheezing, no cough       Gastrointestinal: no complaints of abdominal pain, no constipation, no nausea or diarrhea, no vomiting, no bloody stools       Genitourinary: no complaints of dysuria, no incontinence       Musculoskeletal: arthralgias,-- limb pain-- and-- myalgias, but-- as noted in HPI       Integumentary: no complaints of skin rash or lesion, no itching or dry skin, no skin wounds       Neurological: numbness-- and-- tingling, but-- no complaints of headache, no confusion, no numbness or tingling, no dizziness-- and-- as noted in HPI        Endocrine: No complaints of muscle weakness, no feelings of weakness, no frequent urination, no excessive thirst-- and-- as noted in HPI   feelings of weakness      Psychiatric: No suicidal thoughts, no anxiety, no feelings of depression       Active Problems   1  Abdominal pain (789 00) (R10 9)   2  Acute bronchitis (466 0) (J20 9)   3  Acute on chronic diastolic congestive heart failure (428 33,428 0) (I50 33)   4  Ankle pain, unspecified laterality   5  Arthropathy of knee (716 96) (M17 10)   6  Atherosclerosis of arteries of extremities (440 20) (I70 209)   7  Benign essential hypertension (401 1) (I10)   8  Breast pain (611 71) (N64 4)   9  Bunion, unspecified laterality   10  Callus (700) (L84)   11  Cellulitis (682 9) (L03 90)   12  Chronic low back pain (724 2,338 29) (M54 5,G89 29)   13  Chronic reflux esophagitis (530 11) (K21 0)   14  Chronic venous insufficiency (459 81) (I87 2)   15  CKD (chronic kidney disease), stage III (585 3) (N18 3)   30  VARIZWPN systolic and diastolic HF (heart failure) (428 40) (I50 40)   17  Dehydration (276 51) (E86 0)   18  Dermatitis (692 9) (L30 9)   19  Difficulty in walking (719 7) (R26 2)   20  Discoloration of nail (703 8) (L60 8)   21  Diverticulitis of colon (562 11) (K57 32)   22  Dizziness (780 4) (R42)   23  Dyspnea on exertion (786 09) (R06 09)   24  Dystrophic nail (703 8) (L60 3)   25  Dysuria (788 1) (R30 0)   26  Edema (782 3) (R60 9)   27  Electrolyte or fluid disorder (276 9) (E87 8)   28  Elevated triglycerides with high cholesterol (272 2) (E78 2)   29  Encounter for screening for cardiovascular disorders (V81 2) (Z13 6)   30  Encounter for screening mammogram for breast cancer (V76 12) (Z12 31)   31  Esophageal reflux (530 81) (K21 9)   32  Flu vaccine need (V04 81) (Z23)   33  Flu vaccine need (V04 81) (Z23)   34  Gout (274 9) (M10 9)   35  Headache (784 0) (R51)   36  Hiatal hernia (553 3) (K44 9)   37  Hyperlipemia (272 4) (E78 5)   38  Hypertension (401 9) (I10)   39  Hyperuricemia (790 6) (E79 0)   40  Hypothyroidism (244 9) (E03 9)   41  Infectious diarrhea (009 2) (A09)   42  Knee pain (719 46) (M25 569)   43  Leg swelling (729 81) (M79 89)   44  Localized osteoarthritis of knees, bilateral (715 36) (M17 0)   45  Lumbar canal stenosis (724 02) (M48 061)   46  Lumbar disc herniation with radiculopathy (722 10) (M51 16)   47  Lumbar radiculopathy (724 4) (M54 16)   48  Lumbar stenosis with neurogenic claudication (724 03) (M48 062)   49  Lymphedema (457 1) (I89 0)   50  Medicare annual wellness visit, initial (V70 0) (Z00 00)   51  Medicare annual wellness visit, subsequent (V70 0) (Z00 00)   52  Morbid obesity with body mass index of 40 0-44 9 in adult (278 01,V85 41)      (E66 01,Z68 41)   53  Nails Onychauxis (703 8)   54  Nausea (787 02) (R11 0)   55  Need for pneumococcal vaccination (V03 82) (Z23)   56  Need for prophylactic vaccination and inoculation against influenza (V04 81) (Z23)   57  Need for shingles vaccine (V04 89) (Z23)   58  Nephrolithiasis (592 0) (N20 0)   59  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)   60  Obstructive sleep apnea (327 23) (G47 33)   61  Onychomycosis (110 1) (B35 1)   62  Orthopnea (786 02) (R06 01)   63  Osteoarthritis, localized, knee (715 36) (M17 10)   64  Osteoporosis screening (V82 81) (Z13 820)   65  Other specified anxiety disorders (300 09) (F41 8)   66  Other specified inflammatory spondylopathies, 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) (M46 80)   67  Overweight (278 02) (E66 3)   68  Pain in both feet (729 5) (M79 671,M79 672)   69  Pain in wrist joint (719 43) (M25 539)   70  Palpitations (785 1) (R00 2)   71  Pes planus, unspecified laterality (734) (M21 40)   72  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)   73  Pseudogout of left wrist (714 26,127 74) (X09 990)   74  Renal disorder (593 9) (N28 9)   75  Rupture of popliteal cyst (727 51) (M66 0)   76  Screening for colon cancer (V76 51) (Z12 11)   77  Screening for diabetes mellitus (DM) (V77 1) (Z13 1)   78  Screening for genitourinary condition (V81 6) (Z13 89)   79  Screening for malignant neoplasm of breast (V76 10) (Z12 31)   80  Short unilateral neuralgiform headache, conjunctival injection/tearing (339 05) (G44 059)   81  Somnolence, daytime (780 54) (R40 0)   82  Spinal stenosis of lumbar region (724 02) (M48 061)   83  Tarsal tunnel syndrome (355 5) (G57 50)   84  Tenderness in limb (729 5) (M79 609)   85  Tinea pedis (110 4) (E02 7)   86  Umbilical hernia (806 8) (K42 9)   87  Visit for screening mammogram (V76 12) (Z12 31)     Past Medical History    · Benign essential hypertension (401 1) (I10)   · Depression screen (V79 0) (Z13 89)   · Hiatal hernia (553 3) (K44 9)   · History of abdominal pain (V13 89) (B60 068)   · History of abdominal pain (V13 89) (X42 692)   · History of arthritis (V13 4) (Z87 39)   · History of backache (V13 59) (Z87 39)   · History of cataract (V12 49) (Z86 69)   · History of eczema (V13 3) (Z87 2)   · History of heart failure (V12 59) (Z86 79)   · History of hepatitis (V12 09) (Z86 19)   · History of onychomycosis (V12 09) (Z86 19)   · History of sleep apnea (V13 89) (Z86 69)   · History of Orthopnea (786 02) (R06 01)     The active problems and past medical history were reviewed and updated today       Surgical History    · History of Complete Colonoscopy   · History of Hysterectomy   · History of Incisional Hernia Repair - Incarcerated   · History of Knee Surgery     The surgical history was reviewed and updated today        Family History   Mother    · Family history of Coronary artery disease   · Family history of arthritis (V17 7) (Z82 61)   · Denied: Family history of depression   · Family history of diabetes mellitus (V18 0) (Z83 3)   · Family history of hypertension (V17 49) (Z82 49)   · Denied: Family history of substance abuse  Father    · Denied: Family history of depression   · Family history of hypertension (V17 49) (Z82 49)   · Denied: Family history of substance abuse  Sibling    · Denied: Family history of depression   · Denied: Family history of substance abuse  Family History    · Family history of arthritis (V17 7) (Z82 61)     The family history was reviewed and updated today        Social History    · Denied: History of Alcohol Use (History)   · Daily Coffee Consumption (___ Cups/Day)   · Lack of exercise (V69 0) (Z72 3)   ·    · Never a smoker   · Never a smoker   · Sleeps 6 -7 hours a day  The social history was reviewed and updated today   The social history was reviewed and is unchanged       Current Meds    1  Allopurinol 300 MG Oral Tablet; take one tablet by mouth every day;     Therapy: 83ZMS6025 to (Evaluate:45Pcz6302)  Requested for: 04HSS1456; Last     Rx:61Afx1975 Ordered   2  Amlodipine-Olmesartan 5-20 MG Oral Tablet; TAKE 1 TABLET ONCE DAILY;     Therapy: 18JLB5520 to (Evaluate:83Ctg1635)  Requested for: 01Fmm7147; Last     Rx:82Zzx3449 Ordered   3  Aspirin 81 MG Oral Tablet Chewable;     Therapy: (Recorded:32Noo7639) to Recorded   4  Bumetanide 2 MG Oral Tablet; TAKE 1 TABLET DAILY;     Therapy: 82VXR1437 to (Evaluate:87Lho2254)  Requested for: 26GSZ9391; Last     Rx:11Qwa8150 Ordered   5  Colchicine 0 6 MG Oral Tablet; TAKE 1 TABLET DAILY AS DIRECTED;     Therapy: 42BQR6882 to (Evaluate:44Esr5717)  Requested for: 48QVQ7344; Last     Rx:75Zuo0938 Ordered   6  Gabapentin 300 MG Oral Capsule; TAKE 1 CAPSULE AT BEDTIME;     Therapy: 92WSY5921 to (Evaluate:47Zmd3416)  Requested for: 47FND5082; Last     Rx:61Gcm8336 Ordered   7  Levothyroxine Sodium 100 MCG Oral Tablet; 1 TAb PO Daily in AM;     Therapy: 63BKK0861 to (Last Rx:00Tob1103)  Requested for: 44ECT0435 Ordered   8  Omega-3-acid Ethyl Esters 1 GM Oral Capsule; take 2 capsules by mouth twice a day;     Therapy: 50XKG6511 to (Evaluate:99Jlu0105)  Requested for: 15Arn0573; Last     Rx:37Ppz6431 Ordered   9  Omeprazole 20 MG Oral Capsule Delayed Release; TAKE 1 CAPSULE EVERY DAY AS     DIRECTED  BY  PHYSICIAN;     Therapy: 22CGG6030 to (Evaluate:20Trw3598)  Requested for: 42WCA5129; Last     Rx:85Kzm0211 Ordered   10  Propranolol HCl - 80 MG Oral Tablet; TAKE 1/2 TABLET EVERY 12 HOURS DAILY;      Therapy: 62JWI4153 to (Last Rx:32Gcm3855)  Requested for: 87Jcc9037 Ordered   11  Spironolactone 25 MG Oral Tablet; TAKE 1 TABLET DAILY;      Therapy: 17QPC2472 to Recorded   12  Vitamin D3 TABS; Take 1 tablet daily;      Therapy: (Recorded:02Jun2017) to Recorded   13  Zoster (Zostavax); INJECT 0 5  ML Subcutaneous;      Therapy: 97ONY0294 to (Last Rx:80Kbx0444) Ordered     The medication list was reviewed and updated today       Allergies   1  No Known Drug Allergies     Physical Exam   Left Foot: Appearance: Normal except as noted: excessive pronation-- and-- pes planus     Right Foot: Appearance: Normal except as noted: excessive pronation-- and-- pes planus  Tenderness: None except the calcaneous,-- medial calcaneous-- and-- insertion of the plantar fascia     Left Ankle: Appearance: Normal except ecchymosis-- and-- swelling laterally  ROM: limited ROM in all planes    Right Ankle: ROM: limited ROM in all planes Motor: diffuse weakness     Neurological Exam: performed  Light touch was intact bilaterally  Vibratory sensation was intact bilaterally  Response to monofilament test was intact bilaterally  Deep tendon reflexes: patellar reflex present bilaterally-- and-- achilles reflex present bilaterally     Vascular Exam: performed Dorsalis pedis pulses were 1/4 bilaterally  Posterior tibial pulses were 1/4 bilaterally  Elevation Pallor: diminished bilaterally  Capillary refill time was greater than 3 seconds bilaterally-- and-- Q9 findings bilateral  Negative digital hair noted  Positive abnormal cooling bilateral  Edema: moderate bilaterally-- and-- 6/7 pitting edema  Negative Homans sign     Toenails: All of the toenails were elongated,-- hypertrophied,-- discolored-- and-- Mycotic with onychogryphosis  Note is made of bilateral tinea pedis in moccasin foot   Distribution          Socks and shoes removed, Right Foot Findings: swollen, erythematous and dry       The sensory exam showed diminished vibratory sensation at the level of the toes  Diminished tactile sensation with monofilament testing throughout the right foot       Socks and shoes removed, Left Foot Findings: swollen, erythematous and dry       The sensory exam showed diminished vibratory sensation at the level of the toes  Diminished tactile sensation with monofilament testing throughout the left foot      Capillary refills findings on the right were delayed in the toes       Pulses:      1+ in the posterior tibialis on the right      1+ in the dorsalis pedis on the right       Capillary refills findings on the left were delayed in the toes       Pulses:      1+ in the posterior tibialis on the left      1+ in the dorsalis pedis on the left       Assign Risk Category: 2: Loss of protective sensation with or without weakness, deformity, callus, pre-ulcer, or history of ulceration  High risk     Hyperkeratosis: present on both first toes,-- present on both first sub metatarsals-- and-- Bilateral plantar moccasin tinea pedis noted     Shoe Gear Evaluation: performed ()  Recommendation(s): SAS style       Procedure   All mycotic nails debrided today  Bilateral pre-ulcerative lesions debrided today   Procedures performed without pain or complication             There are no diagnoses linked to this encounter        Subjective:       Patient ID: Jama Gay is a 65 y o  female      HPI     The following portions of the patient's history were reviewed and updated as appropriate: allergies, current medications, past family history, past medical history, past social history, past surgical history and problem list      Review of Systems       Objective:      Foot Exam     Right Foot/Ankle      Inspection and Palpation  Skin Exam: callus and dry skin;      Neurovascular  Dorsalis pedis: 1+  Posterior tibial: 1+        Left Foot/Ankle    Inspection and Palpation  Skin Exam: callus and dry skin;      Neurovascular  Dorsalis pedis: 1+  Posterior tibial: 1+        Physical Exam   Cardiovascular: Pulses are weak pulses  Pulses:       Dorsalis pedis pulses are 1+ on the right side, and 1+ on the left side  Posterior tibial pulses are 1+ on the right side, and 1+ on the left side  Feet:   Right Foot:   Skin Integrity: Positive for callus and dry skin  Left Foot:   Skin Integrity: Positive for callus and dry skin  Patient's shoes and socks removed  Right Foot/Ankle   Right Foot Inspection  Skin Exam: dry skin, callus and callus                           Toe Exam: swelling and erythema  Sensory   Vibration: diminished  Proprioception: diminished   Monofilament testing: diminished  Vascular  Capillary refills: < 3 seconds  The right DP pulse is 1+  The right PT pulse is 1+       Left Foot/Ankle  Left Foot Inspection  Skin Exam: dry skin and callus                          Toe Exam: swelling and erythema                   Sensory   Vibration: diminished  Proprioception: diminished  Monofilament: diminished  Vascular  Capillary refills: < 3 seconds  The left DP pulse is 1+  The left PT pulse is 1+  Assign Risk Category:  Deformity present;  Loss of protective sensation; Weak pulses       Risk: 2

## 2018-05-31 ENCOUNTER — APPOINTMENT (OUTPATIENT)
Dept: LAB | Facility: CLINIC | Age: 78
End: 2018-05-31
Payer: MEDICARE

## 2018-05-31 DIAGNOSIS — E03.9 HYPOTHYROIDISM, UNSPECIFIED TYPE: ICD-10-CM

## 2018-05-31 DIAGNOSIS — I10 BENIGN ESSENTIAL HYPERTENSION: ICD-10-CM

## 2018-05-31 DIAGNOSIS — M10.9 GOUT OF WRIST, UNSPECIFIED CAUSE, UNSPECIFIED CHRONICITY, UNSPECIFIED LATERALITY: ICD-10-CM

## 2018-05-31 DIAGNOSIS — E78.5 HYPERLIPIDEMIA, UNSPECIFIED HYPERLIPIDEMIA TYPE: ICD-10-CM

## 2018-05-31 LAB
ALBUMIN SERPL BCP-MCNC: 3.6 G/DL (ref 3.5–5)
ALP SERPL-CCNC: 127 U/L (ref 46–116)
ALT SERPL W P-5'-P-CCNC: 24 U/L (ref 12–78)
ANION GAP SERPL CALCULATED.3IONS-SCNC: 6 MMOL/L (ref 4–13)
AST SERPL W P-5'-P-CCNC: 18 U/L (ref 5–45)
BASOPHILS # BLD AUTO: 0.03 THOUSANDS/ΜL (ref 0–0.1)
BASOPHILS NFR BLD AUTO: 1 % (ref 0–1)
BILIRUB SERPL-MCNC: 0.38 MG/DL (ref 0.2–1)
BUN SERPL-MCNC: 29 MG/DL (ref 5–25)
CALCIUM SERPL-MCNC: 9.4 MG/DL (ref 8.3–10.1)
CHLORIDE SERPL-SCNC: 105 MMOL/L (ref 100–108)
CHOLEST SERPL-MCNC: 180 MG/DL (ref 50–200)
CO2 SERPL-SCNC: 29 MMOL/L (ref 21–32)
CREAT SERPL-MCNC: 1.29 MG/DL (ref 0.6–1.3)
CREAT UR-MCNC: 67.4 MG/DL
EOSINOPHIL # BLD AUTO: 0.18 THOUSAND/ΜL (ref 0–0.61)
EOSINOPHIL NFR BLD AUTO: 3 % (ref 0–6)
ERYTHROCYTE [DISTWIDTH] IN BLOOD BY AUTOMATED COUNT: 15.6 % (ref 11.6–15.1)
GFR SERPL CREATININE-BSD FRML MDRD: 40 ML/MIN/1.73SQ M
GLUCOSE P FAST SERPL-MCNC: 93 MG/DL (ref 65–99)
HCT VFR BLD AUTO: 36.7 % (ref 34.8–46.1)
HDLC SERPL-MCNC: 46 MG/DL (ref 40–60)
HGB BLD-MCNC: 11.2 G/DL (ref 11.5–15.4)
IMM GRANULOCYTES # BLD AUTO: 0.03 THOUSAND/UL (ref 0–0.2)
IMM GRANULOCYTES NFR BLD AUTO: 1 % (ref 0–2)
LDLC SERPL CALC-MCNC: 105 MG/DL (ref 0–100)
LYMPHOCYTES # BLD AUTO: 0.78 THOUSANDS/ΜL (ref 0.6–4.47)
LYMPHOCYTES NFR BLD AUTO: 13 % (ref 14–44)
MCH RBC QN AUTO: 27.5 PG (ref 26.8–34.3)
MCHC RBC AUTO-ENTMCNC: 30.5 G/DL (ref 31.4–37.4)
MCV RBC AUTO: 90 FL (ref 82–98)
MICROALBUMIN UR-MCNC: 7.4 MG/L (ref 0–20)
MICROALBUMIN/CREAT 24H UR: 11 MG/G CREATININE (ref 0–30)
MONOCYTES # BLD AUTO: 0.6 THOUSAND/ΜL (ref 0.17–1.22)
MONOCYTES NFR BLD AUTO: 10 % (ref 4–12)
NEUTROPHILS # BLD AUTO: 4.51 THOUSANDS/ΜL (ref 1.85–7.62)
NEUTS SEG NFR BLD AUTO: 72 % (ref 43–75)
NONHDLC SERPL-MCNC: 134 MG/DL
NRBC BLD AUTO-RTO: 0 /100 WBCS
PLATELET # BLD AUTO: 182 THOUSANDS/UL (ref 149–390)
PMV BLD AUTO: 11.8 FL (ref 8.9–12.7)
POTASSIUM SERPL-SCNC: 4.2 MMOL/L (ref 3.5–5.3)
PROT SERPL-MCNC: 7.4 G/DL (ref 6.4–8.2)
RBC # BLD AUTO: 4.08 MILLION/UL (ref 3.81–5.12)
SODIUM SERPL-SCNC: 140 MMOL/L (ref 136–145)
TRIGL SERPL-MCNC: 146 MG/DL
TSH SERPL DL<=0.05 MIU/L-ACNC: 3.63 UIU/ML (ref 0.36–3.74)
URATE SERPL-MCNC: 5 MG/DL (ref 2–6.8)
WBC # BLD AUTO: 6.13 THOUSAND/UL (ref 4.31–10.16)

## 2018-05-31 PROCEDURE — 82570 ASSAY OF URINE CREATININE: CPT

## 2018-05-31 PROCEDURE — 82043 UR ALBUMIN QUANTITATIVE: CPT

## 2018-05-31 PROCEDURE — 84443 ASSAY THYROID STIM HORMONE: CPT

## 2018-05-31 PROCEDURE — 85025 COMPLETE CBC W/AUTO DIFF WBC: CPT

## 2018-05-31 PROCEDURE — 80053 COMPREHEN METABOLIC PANEL: CPT

## 2018-05-31 PROCEDURE — 36415 COLL VENOUS BLD VENIPUNCTURE: CPT

## 2018-05-31 PROCEDURE — 80061 LIPID PANEL: CPT

## 2018-05-31 PROCEDURE — 84550 ASSAY OF BLOOD/URIC ACID: CPT

## 2018-06-04 ENCOUNTER — OFFICE VISIT (OUTPATIENT)
Dept: FAMILY MEDICINE CLINIC | Facility: CLINIC | Age: 78
End: 2018-06-04
Payer: MEDICARE

## 2018-06-04 VITALS
WEIGHT: 212 LBS | HEIGHT: 61 IN | DIASTOLIC BLOOD PRESSURE: 50 MMHG | HEART RATE: 57 BPM | OXYGEN SATURATION: 97 % | BODY MASS INDEX: 40.02 KG/M2 | SYSTOLIC BLOOD PRESSURE: 120 MMHG

## 2018-06-04 DIAGNOSIS — D50.8 IRON DEFICIENCY ANEMIA SECONDARY TO INADEQUATE DIETARY IRON INTAKE: ICD-10-CM

## 2018-06-04 DIAGNOSIS — E03.9 HYPOTHYROIDISM, UNSPECIFIED TYPE: Primary | ICD-10-CM

## 2018-06-04 DIAGNOSIS — M10.9 GOUT OF WRIST, UNSPECIFIED CAUSE, UNSPECIFIED CHRONICITY, UNSPECIFIED LATERALITY: ICD-10-CM

## 2018-06-04 DIAGNOSIS — I50.40 COMBINED SYSTOLIC AND DIASTOLIC HEART FAILURE, UNSPECIFIED HF CHRONICITY (HCC): ICD-10-CM

## 2018-06-04 DIAGNOSIS — E78.5 HYPERLIPIDEMIA, UNSPECIFIED HYPERLIPIDEMIA TYPE: ICD-10-CM

## 2018-06-04 DIAGNOSIS — I10 BENIGN ESSENTIAL HYPERTENSION: ICD-10-CM

## 2018-06-04 DIAGNOSIS — K21.9 GASTROESOPHAGEAL REFLUX DISEASE WITHOUT ESOPHAGITIS: ICD-10-CM

## 2018-06-04 PROBLEM — E11.42 DIABETIC POLYNEUROPATHY ASSOCIATED WITH TYPE 2 DIABETES MELLITUS (HCC): Status: RESOLVED | Noted: 2018-03-15 | Resolved: 2018-06-04

## 2018-06-04 PROCEDURE — 99213 OFFICE O/P EST LOW 20 MIN: CPT | Performed by: FAMILY MEDICINE

## 2018-06-04 RX ORDER — ALLOPURINOL 300 MG/1
300 TABLET ORAL EVERY MORNING
Qty: 90 TABLET | Refills: 1 | Status: SHIPPED | OUTPATIENT
Start: 2018-06-04 | End: 2018-12-03 | Stop reason: SDUPTHER

## 2018-06-04 RX ORDER — PREDNISONE 10 MG/1
10 TABLET ORAL DAILY
Qty: 5 TABLET | Refills: 0 | Status: SHIPPED | OUTPATIENT
Start: 2018-06-04 | End: 2018-12-03

## 2018-06-04 RX ORDER — SPIRONOLACTONE 25 MG/1
25 TABLET ORAL DAILY
Qty: 90 TABLET | Refills: 1 | Status: SHIPPED | OUTPATIENT
Start: 2018-06-04 | End: 2018-12-03 | Stop reason: SDUPTHER

## 2018-06-04 RX ORDER — LEVOTHYROXINE SODIUM 0.1 MG/1
100 TABLET ORAL DAILY
Qty: 90 TABLET | Refills: 1 | Status: SHIPPED | OUTPATIENT
Start: 2018-06-04 | End: 2018-10-13 | Stop reason: SDUPTHER

## 2018-06-04 RX ORDER — OMEPRAZOLE 20 MG/1
20 CAPSULE, DELAYED RELEASE ORAL EVERY MORNING
Qty: 90 CAPSULE | Refills: 1 | Status: SHIPPED | OUTPATIENT
Start: 2018-06-04 | End: 2019-06-18 | Stop reason: SDUPTHER

## 2018-06-04 NOTE — PROGRESS NOTES
Subjective:      Patient ID: Trang Helm is a 65 y o  female  Patient is here for routine 6 month follow-up  She has a history of hypertension, hypothyroidism, gout  Review of her labs revealed a borderline low hemoglobin  Patient admits to diet lacking in green leafy vegetables  She is not keen on trying any over-the-counter iron supplements  She denies any blood in the stools  Also has a history of chronic gout for which she takes allopurinol  Patient to colchicine a few days ago since her left ankle was swollen  The medication helped improve her symptoms but patient is reporting diarrhea as a side effect of colchicine  She wants to try some other medication to help improve her inflammation  She follows up with Cardiology regarding her chronic cardiac issues  Anemia   Presents for initial visit  There has been no bruising/bleeding easily, pallor, palpitations, paresthesias or weight loss  Signs of blood loss that are not present include hematemesis, hematochezia and melena  Past treatments include nothing  There is no history of inflammatory bowel disease or malabsorption  There is no past history of FOBT  Hypertension   This is a chronic problem  The current episode started more than 1 year ago  The problem has been gradually improving since onset  The problem is controlled  Pertinent negatives include no chest pain, palpitations, peripheral edema or shortness of breath  There are no associated agents to hypertension  Risk factors for coronary artery disease include sedentary lifestyle, obesity and post-menopausal state  Treatments tried: amlodipine -olmesartan 10-40 mg daily  The current treatment provides significant improvement  There are no compliance problems  Identifiable causes of hypertension include a thyroid problem  Thyroid Problem   Presents for follow-up visit  Patient reports no depressed mood, fatigue, heat intolerance, palpitations or weight loss   The symptoms have been stable (levothyroxine 100 mcg daily)  Heartburn   She reports no chest pain  This is a chronic problem  The current episode started more than 1 year ago  The problem occurs occasionally  The problem has been gradually improving  The symptoms are aggravated by certain foods  Pertinent negatives include no fatigue, melena or weight loss  She has tried a PPI for the symptoms  The treatment provided significant relief  Past Medical History:   Diagnosis Date    Arthritis     joints    Cataract     Disease of thyroid gland     Eczema     GERD (gastroesophageal reflux disease)     Gout     Heart failure (HCC)     Hepatitis     Hiatal hernia     Hypertension     Onychomycosis     last assessed: 16    Orthopnea     resolved: 16    Sleep apnea     wears CPAP       Family History   Problem Relation Age of Onset    Diabetes Mother     Coronary artery disease Mother     Arthritis Mother     Hypertension Mother     Hypertension Father     Diabetes Brother     Diabetes Son     Arthritis Family        Past Surgical History:   Procedure Laterality Date    ABDOMINAL SURGERY          BREAST SURGERY Right     cyst removal    CARPAL TUNNEL RELEASE Left     CARPAL TUNNEL RELEASE Right     CATARACT EXTRACTION       SECTION  1960    x1    COLONOSCOPY N/A 2018    Procedure: COLONOSCOPY;  Surgeon: Gabriel Lockhart MD;  Location: [facility] GI [facility]; Service: Gastroenterology    DILATION AND CURETTAGE OF UTERUS  1967    EYE SURGERY Left 2006    cataracts    HERNIA REPAIR      umbilical hernia    HYSTERECTOMY      INCISIONAL HERNIA REPAIR      incarcerated    KNEE ARTHROSCOPY Right     MD REMV CATARACT EXTRACAP,INSERT LENS Right 3/27/2017    Procedure: EXTRACTION EXTRACAPSULAR CATARACT PHACO INTRAOCULAR LENS (IOL); Surgeon: Rafael Manley MD;  Location: [facility] MAIN OR;  Service: Ophthalmology        reports that she has never smoked   She has never used smokeless tobacco  She reports that she does not drink alcohol or use drugs  Current Outpatient Prescriptions:     allopurinol (ZYLOPRIM) 300 mg tablet, Take 1 tablet (300 mg total) by mouth every morning, Disp: 90 tablet, Rfl: 1    amlodipine-olmesartan (BLACK) 10-40 MG, Take 1 tablet by mouth daily, Disp: , Rfl:     aspirin 81 MG tablet, Take 81 mg by mouth every morning  , Disp: , Rfl:     bumetanide (BUMEX) 2 mg tablet, Take 2 mg by mouth every morning, Disp: , Rfl:     cholecalciferol (VITAMIN D3) 1,000 units tablet, Take 1 tablet by mouth daily, Disp: , Rfl:     diclofenac sodium (VOLTAREN) 1 %, Apply 2 g topically 4 (four) times a day, Disp: 100 g, Rfl: 0    gabapentin (NEURONTIN) 300 mg capsule, Take 100 mg by mouth daily at bedtime, Disp: , Rfl:     levothyroxine 100 mcg tablet, Take 1 tablet (100 mcg total) by mouth daily, Disp: 90 tablet, Rfl: 1    Omega-3-acid Ethyl Esters & D3 1 & 1000 GM & UNIT KIT, Take 2 capsules by mouth 2 (two) times a day, Disp: , Rfl:     omeprazole (PriLOSEC) 20 mg delayed release capsule, Take 1 capsule (20 mg total) by mouth every morning, Disp: 90 capsule, Rfl: 1    propranolol (INDERAL) 80 mg tablet, Take 80 mg by mouth 2 (two) times a day , Disp: , Rfl:     spironolactone (ALDACTONE) 25 mg tablet, Take 1 tablet (25 mg total) by mouth daily, Disp: 90 tablet, Rfl: 1    predniSONE 10 mg tablet, Take 1 tablet (10 mg total) by mouth daily, Disp: 5 tablet, Rfl: 0    The following portions of the patient's history were reviewed and updated as appropriate: allergies, current medications, past family history, past medical history, past social history, past surgical history and problem list     Review of Systems   Constitutional: Negative  Negative for fatigue and weight loss  Respiratory: Negative  Negative for shortness of breath  Cardiovascular: Negative  Negative for chest pain and palpitations  Gastrointestinal: Negative for hematemesis, hematochezia and melena  Endocrine: Negative for heat intolerance  Musculoskeletal: Negative for myalgias  Skin: Negative for pallor  Neurological: Negative  Negative for paresthesias  Hematological: Does not bruise/bleed easily  Psychiatric/Behavioral: Negative  Objective:    /50   Pulse 57   Ht 5' 1" (1 549 m)   Wt 96 2 kg (212 lb)   SpO2 97%   BMI 40 06 kg/m²      Physical Exam   Constitutional: She is oriented to person, place, and time  She appears well-developed and well-nourished  Cardiovascular: Normal rate, regular rhythm and normal heart sounds  Pulmonary/Chest: Effort normal and breath sounds normal    Abdominal: Soft  Bowel sounds are normal    Musculoskeletal:   Left ankle swelling   Neurological: She is alert and oriented to person, place, and time     Psychiatric: Her behavior is normal  Judgment normal          Recent Results (from the past 1008 hour(s))   CBC and differential    Collection Time: 05/31/18  7:48 AM   Result Value Ref Range    WBC 6 13 4 31 - 10 16 Thousand/uL    RBC 4 08 3 81 - 5 12 Million/uL    Hemoglobin 11 2 (L) 11 5 - 15 4 g/dL    Hematocrit 36 7 34 8 - 46 1 %    MCV 90 82 - 98 fL    MCH 27 5 26 8 - 34 3 pg    MCHC 30 5 (L) 31 4 - 37 4 g/dL    RDW 15 6 (H) 11 6 - 15 1 %    MPV 11 8 8 9 - 12 7 fL    Platelets 464 711 - 949 Thousands/uL    nRBC 0 /100 WBCs    Neutrophils Relative 72 43 - 75 %    Immat GRANS % 1 0 - 2 %    Lymphocytes Relative 13 (L) 14 - 44 %    Monocytes Relative 10 4 - 12 %    Eosinophils Relative 3 0 - 6 %    Basophils Relative 1 0 - 1 %    Neutrophils Absolute 4 51 1 85 - 7 62 Thousands/µL    Immature Grans Absolute 0 03 0 00 - 0 20 Thousand/uL    Lymphocytes Absolute 0 78 0 60 - 4 47 Thousands/µL    Monocytes Absolute 0 60 0 17 - 1 22 Thousand/µL    Eosinophils Absolute 0 18 0 00 - 0 61 Thousand/µL    Basophils Absolute 0 03 0 00 - 0 10 Thousands/µL   Comprehensive metabolic panel    Collection Time: 05/31/18  7:48 AM   Result Value Ref Range Sodium 140 136 - 145 mmol/L    Potassium 4 2 3 5 - 5 3 mmol/L    Chloride 105 100 - 108 mmol/L    CO2 29 21 - 32 mmol/L    Anion Gap 6 4 - 13 mmol/L    BUN 29 (H) 5 - 25 mg/dL    Creatinine 1 29 0 60 - 1 30 mg/dL    Glucose, Fasting 93 65 - 99 mg/dL    Calcium 9 4 8 3 - 10 1 mg/dL    AST 18 5 - 45 U/L    ALT 24 12 - 78 U/L    Alkaline Phosphatase 127 (H) 46 - 116 U/L    Total Protein 7 4 6 4 - 8 2 g/dL    Albumin 3 6 3 5 - 5 0 g/dL    Total Bilirubin 0 38 0 20 - 1 00 mg/dL    eGFR 40 ml/min/1 73sq m   Lipid panel    Collection Time: 05/31/18  7:48 AM   Result Value Ref Range    Cholesterol 180 50 - 200 mg/dL    Triglycerides 146 <=150 mg/dL    HDL, Direct 46 40 - 60 mg/dL    LDL Calculated 105 (H) 0 - 100 mg/dL    Non-HDL-Chol (CHOL-HDL) 134 mg/dl   TSH, 3rd generation with T4 reflex    Collection Time: 05/31/18  7:48 AM   Result Value Ref Range    TSH 3RD GENERATON 3 630 0 358 - 3 740 uIU/mL   Uric acid    Collection Time: 05/31/18  7:48 AM   Result Value Ref Range    Uric Acid 5 0 2 0 - 6 8 mg/dL   Microalbumin / creatinine urine ratio    Collection Time: 05/31/18 12:20 PM   Result Value Ref Range    Creatinine, Ur 67 4 mg/dL    Microalbum  ,U,Random 7 4 0 0 - 20 0 mg/L    Microalb Creat Ratio 11 0 - 30 mg/g creatinine       Assessment/Plan:      Patient will continue taking all the medications as prescribed  continue spironolactone  For  Symptoms of pedal edema  Patient encouraged to maintain her follow-ups with Cardiology  Started on a low-dose of prednisone for acute gout  She will continue allopurinol as her maintenance for now  Six-month follow up with routine labs advised  Diagnoses and all orders for this visit:    Hypothyroidism, unspecified type  -     levothyroxine 100 mcg tablet;  Take 1 tablet (100 mcg total) by mouth daily  -     CBC and differential  -     Comprehensive metabolic panel  -     TSH, 3rd generation with T4 reflex    Benign essential hypertension  -     CBC and differential  - Comprehensive metabolic panel    Combined systolic and diastolic heart failure, unspecified HF chronicity (HCC)  -     spironolactone (ALDACTONE) 25 mg tablet; Take 1 tablet (25 mg total) by mouth daily  -     CBC and differential  -     Comprehensive metabolic panel    Gout of wrist, unspecified cause, unspecified chronicity, unspecified laterality  -     allopurinol (ZYLOPRIM) 300 mg tablet; Take 1 tablet (300 mg total) by mouth every morning  -     CBC and differential  -     Comprehensive metabolic panel  -     predniSONE 10 mg tablet; Take 1 tablet (10 mg total) by mouth daily    Hyperlipidemia, unspecified hyperlipidemia type  -     CBC and differential  -     Comprehensive metabolic panel  -     Lipid panel    Gastroesophageal reflux disease without esophagitis  -     omeprazole (PriLOSEC) 20 mg delayed release capsule;  Take 1 capsule (20 mg total) by mouth every morning  -     CBC and differential  -     Comprehensive metabolic panel    Iron deficiency anemia secondary to inadequate dietary iron intake

## 2018-07-12 ENCOUNTER — LAB (OUTPATIENT)
Dept: LAB | Facility: CLINIC | Age: 78
End: 2018-07-12
Payer: MEDICARE

## 2018-07-12 ENCOUNTER — TRANSCRIBE ORDERS (OUTPATIENT)
Dept: LAB | Facility: CLINIC | Age: 78
End: 2018-07-12

## 2018-07-12 DIAGNOSIS — R74.8 ALKALINE PHOSPHATASE ELEVATION: ICD-10-CM

## 2018-07-12 DIAGNOSIS — N18.30 CKD (CHRONIC KIDNEY DISEASE), STAGE III (HCC): ICD-10-CM

## 2018-07-12 DIAGNOSIS — I10 BENIGN ESSENTIAL HYPERTENSION: ICD-10-CM

## 2018-07-12 LAB
ALP SERPL-CCNC: 122 U/L (ref 46–116)
ANION GAP SERPL CALCULATED.3IONS-SCNC: 6 MMOL/L (ref 4–13)
BACTERIA UR QL AUTO: ABNORMAL /HPF
BILIRUB UR QL STRIP: NEGATIVE
BUN SERPL-MCNC: 46 MG/DL (ref 5–25)
CALCIUM SERPL-MCNC: 9.2 MG/DL (ref 8.3–10.1)
CHLORIDE SERPL-SCNC: 107 MMOL/L (ref 100–108)
CLARITY UR: CLEAR
CO2 SERPL-SCNC: 27 MMOL/L (ref 21–32)
COLOR UR: YELLOW
CREAT SERPL-MCNC: 1.29 MG/DL (ref 0.6–1.3)
CREAT UR-MCNC: 114 MG/DL
ERYTHROCYTE [DISTWIDTH] IN BLOOD BY AUTOMATED COUNT: 16.1 % (ref 11.6–15.1)
GFR SERPL CREATININE-BSD FRML MDRD: 40 ML/MIN/1.73SQ M
GLUCOSE P FAST SERPL-MCNC: 101 MG/DL (ref 65–99)
GLUCOSE UR STRIP-MCNC: NEGATIVE MG/DL
HCT VFR BLD AUTO: 36.4 % (ref 34.8–46.1)
HGB BLD-MCNC: 11.1 G/DL (ref 11.5–15.4)
HGB UR QL STRIP.AUTO: NEGATIVE
HYALINE CASTS #/AREA URNS LPF: ABNORMAL /LPF
KETONES UR STRIP-MCNC: NEGATIVE MG/DL
LEUKOCYTE ESTERASE UR QL STRIP: ABNORMAL
MCH RBC QN AUTO: 27.1 PG (ref 26.8–34.3)
MCHC RBC AUTO-ENTMCNC: 30.5 G/DL (ref 31.4–37.4)
MCV RBC AUTO: 89 FL (ref 82–98)
NITRITE UR QL STRIP: NEGATIVE
NON-SQ EPI CELLS URNS QL MICRO: ABNORMAL /HPF
PH UR STRIP.AUTO: 6 [PH] (ref 4.5–8)
PHOSPHATE SERPL-MCNC: 3.7 MG/DL (ref 2.3–4.1)
PLATELET # BLD AUTO: 174 THOUSANDS/UL (ref 149–390)
PMV BLD AUTO: 11.8 FL (ref 8.9–12.7)
POTASSIUM SERPL-SCNC: 4.1 MMOL/L (ref 3.5–5.3)
PROT UR STRIP-MCNC: NEGATIVE MG/DL
PROT UR-MCNC: 11 MG/DL
PROT/CREAT UR: 0.1 MG/G{CREAT} (ref 0–0.1)
RBC # BLD AUTO: 4.09 MILLION/UL (ref 3.81–5.12)
RBC #/AREA URNS AUTO: ABNORMAL /HPF
SODIUM SERPL-SCNC: 140 MMOL/L (ref 136–145)
SP GR UR STRIP.AUTO: 1.02 (ref 1–1.03)
UROBILINOGEN UR QL STRIP.AUTO: 0.2 E.U./DL
WBC # BLD AUTO: 6.6 THOUSAND/UL (ref 4.31–10.16)
WBC #/AREA URNS AUTO: ABNORMAL /HPF

## 2018-07-12 PROCEDURE — 82570 ASSAY OF URINE CREATININE: CPT

## 2018-07-12 PROCEDURE — 80048 BASIC METABOLIC PNL TOTAL CA: CPT

## 2018-07-12 PROCEDURE — 85027 COMPLETE CBC AUTOMATED: CPT

## 2018-07-12 PROCEDURE — 36415 COLL VENOUS BLD VENIPUNCTURE: CPT

## 2018-07-12 PROCEDURE — 81001 URINALYSIS AUTO W/SCOPE: CPT

## 2018-07-12 PROCEDURE — 84156 ASSAY OF PROTEIN URINE: CPT

## 2018-07-12 PROCEDURE — 84100 ASSAY OF PHOSPHORUS: CPT

## 2018-07-12 PROCEDURE — 84075 ASSAY ALKALINE PHOSPHATASE: CPT

## 2018-07-25 ENCOUNTER — OFFICE VISIT (OUTPATIENT)
Dept: NEPHROLOGY | Facility: CLINIC | Age: 78
End: 2018-07-25
Payer: MEDICARE

## 2018-07-25 VITALS
BODY MASS INDEX: 40.22 KG/M2 | HEIGHT: 61 IN | SYSTOLIC BLOOD PRESSURE: 128 MMHG | WEIGHT: 213 LBS | HEART RATE: 66 BPM | DIASTOLIC BLOOD PRESSURE: 50 MMHG

## 2018-07-25 DIAGNOSIS — I10 BENIGN ESSENTIAL HTN: ICD-10-CM

## 2018-07-25 DIAGNOSIS — N18.30 CKD (CHRONIC KIDNEY DISEASE), STAGE III (HCC): Primary | ICD-10-CM

## 2018-07-25 PROCEDURE — 99212 OFFICE O/P EST SF 10 MIN: CPT | Performed by: INTERNAL MEDICINE

## 2018-07-25 RX ORDER — COLCHICINE 0.6 MG/1
0.6 TABLET ORAL DAILY PRN
COMMUNITY
End: 2019-03-08

## 2018-07-25 NOTE — PROGRESS NOTES
NEPHROLOGY OFFICE VISIT   Sanjuana Hightower 65 y o  female MRN: [id number]  7/25/2018    Reason for Visit: CKD III    ASSESSMENT and PLAN:    I had the pleasure of seeing Ms Augustus Maria today in the renal clinic for the continued management of CKD III  Since our last visit, there has been no ER visits or hospitalizations  He currently has no complaints at this time and is feeling well  Patient denies any chest pain, shortness of breath and swelling  The last blood work was done on 7/12/18, which we have reviewed together  34-year-old female with a past medical history of chronic kidney disease, venous stasis, HTN, hypothyroidism, lumbar canal stenosis, Anxiety, GERD, neuropathy, Gout, EF 93-84%, Grade 2 diastolic dysfunction who presents for follow up for CKD  To note, patient's  has parkinson late state per patient     1) CKD - Prior year had nl Cr prior to aug 2016 and then had SHABANA during diarrhea episode  To note, patient also has a long standing history of HTN  Creatinine in 2013, 0 9 mg/dL  Cr early 2016 was 0 80-0 9 mg/dL; then increased starting in august 2016 to 2 3 mg/dL and has fluctuated since  CT scan in 2016, kidneys appearing normal at that time  UA in august was bland       Etiology of CKD may be long standing HTN and ATN  Baseline Cr ~ 1 1 -1 3 mg/dL     - Urine eos 0%;   - A1c 5 6% in nov  - UA july - small leuk/2-4 WBC;   - C4 nl 38; C3 nl 142  - Renal u/s with R 10 3 cm, L 10 4 cm, renal cortex 1 cm b/l; both kidneys slightly reduced in size; lower pole of R kidney is non obstructing calculus  - Pt follows with Urology team for nephrolithiasis  - No NSAIDs, the patient is not currently taking NSAID  - most recent Cr stable 1 29 mg/dL   -UA with 10-20 WBC- pt was treated for UTI 3 weeks ago  Now symptoms resolved and asymptomatic at time of this urine    No changes for now  -urine protein creatinine ratio minimal is 0 1  - labwork in 4 months, appt thereafter  - continue ARB  -patient has many stressors in her life mainly  is ill, approaching hospice care  This will take priority for the patient in the coming months, and therefore I advised the patient that we can see the patient in 5 months and if the patient needs anything in the interim can call any time     2) Volume - follows with Cardiology; has sign of edema today       - pt on bumetanide 1 mg once daily  - pt self doses extra dose if needed appropriately     3) HTN -      -orthostasis has improved  - continue amlodipine/olmesartan; bumex, spironolactone  - on propranolol     4) hypothyroid - as per Primary Care Physician     5) HPL - Primary Care following       6) Electrolytes - stable     7) MBD -  and repeat is 83 8 in february     - recheck vit D, phos, PTH     8) gout -      - on allopurinol  -patient only takes colchicine up to 3 doses max when has a gout flare    9) back pain - follows with Pain management     10) Colonoscopy in feb with internal hemorrhoids and polyp removed       11) alk phos elevation     - stable, but still elevated  - LFTs nl in may  - was started on allopurinol since last check when was normal  Could be related to allopurinol     12) Anemia - Hb stable    It was a pleasure evaluating your patient in the office today  Thank you for allowing our team to participate in the care of Ms Eryn Brasher  Please do not hesitate to contact our team if further issues/questions shall arise in the interim  No problem-specific Assessment & Plan notes found for this encounter  HPI:    Patient denies complaints  Does have edema today on exam, but states that did not take diuretics this morning given the appointment today  Edema is relatively unchanged  Denies shortness of breath   is ill and in a nursing home currently      PATIENT INSTRUCTIONS:    Patient Instructions   1) please do your labwork 1-2 weeks prior to appointment  2) no changes to your labwork        OBJECTIVE:  Current Weight: Weight - Scale: 96 6 kg (213 lb)  Vitals:    07/25/18 0936   BP: 128/50   BP Location: Right arm   Patient Position: Sitting   Cuff Size: Adult   Pulse: 66   Weight: 96 6 kg (213 lb)   Height: 5' 1" (1 549 m)    Body mass index is 40 25 kg/m²  REVIEW OF SYSTEMS:    Review of Systems   Constitutional: Negative  Negative for fatigue  HENT: Negative  Eyes: Negative  Respiratory: Negative  Negative for shortness of breath  Cardiovascular: Negative  Negative for leg swelling  Gastrointestinal: Negative  Endocrine: Negative  Genitourinary: Negative  Negative for difficulty urinating  Musculoskeletal: Negative  Skin: Negative  Allergic/Immunologic: Negative  Neurological: Negative  Hematological: Negative  Psychiatric/Behavioral: Negative  All other systems reviewed and are negative  PHYSICAL EXAM:      Physical Exam   Constitutional: She is oriented to person, place, and time  She appears well-developed and well-nourished  No distress  HENT:   Head: Normocephalic and atraumatic  Mouth/Throat: No oropharyngeal exudate  Eyes: Conjunctivae are normal  Right eye exhibits no discharge  Left eye exhibits no discharge  No scleral icterus  Neck: Normal range of motion  Neck supple  Cardiovascular: Normal rate and regular rhythm  Exam reveals no gallop and no friction rub  No murmur heard  Pulmonary/Chest: Effort normal and breath sounds normal  No respiratory distress  She has no wheezes  She has no rales  Abdominal: Soft  Bowel sounds are normal  She exhibits no distension  There is no tenderness  There is no rebound  Musculoskeletal: Normal range of motion  She exhibits edema  She exhibits no tenderness or deformity  1+ LE edema unchanged    Neurological: She is alert and oriented to person, place, and time  Coordination normal    Skin: Skin is warm and dry  No rash noted  She is not diaphoretic  No erythema  No pallor     Psychiatric: She has a normal mood and affect  Her behavior is normal  Judgment and thought content normal    Nursing note and vitals reviewed        Medications:    Current Outpatient Prescriptions:     allopurinol (ZYLOPRIM) 300 mg tablet, Take 1 tablet (300 mg total) by mouth every morning, Disp: 90 tablet, Rfl: 1    amlodipine-olmesartan (BLACK) 10-40 MG, Take 1 tablet by mouth daily, Disp: , Rfl:     aspirin 81 MG tablet, Take 81 mg by mouth every morning  , Disp: , Rfl:     bumetanide (BUMEX) 2 mg tablet, Take 2 mg by mouth every morning, Disp: , Rfl:     cholecalciferol (VITAMIN D3) 1,000 units tablet, Take 1 tablet by mouth daily, Disp: , Rfl:     colchicine (COLCRYS) 0 6 mg tablet, Take 0 6 mg by mouth daily as needed Only in gout flare   , Disp: , Rfl:     gabapentin (NEURONTIN) 300 mg capsule, Take 100 mg by mouth daily at bedtime, Disp: , Rfl:     levothyroxine 100 mcg tablet, Take 1 tablet (100 mcg total) by mouth daily, Disp: 90 tablet, Rfl: 1    Omega-3-acid Ethyl Esters & D3 1 & 1000 GM & UNIT KIT, Take 2 capsules by mouth 2 (two) times a day, Disp: , Rfl:     omeprazole (PriLOSEC) 20 mg delayed release capsule, Take 1 capsule (20 mg total) by mouth every morning, Disp: 90 capsule, Rfl: 1    propranolol (INDERAL) 80 mg tablet, Take 80 mg by mouth 2 (two) times a day , Disp: , Rfl:     spironolactone (ALDACTONE) 25 mg tablet, Take 1 tablet (25 mg total) by mouth daily, Disp: 90 tablet, Rfl: 1    diclofenac sodium (VOLTAREN) 1 %, Apply 2 g topically 4 (four) times a day, Disp: 100 g, Rfl: 0    predniSONE 10 mg tablet, Take 1 tablet (10 mg total) by mouth daily, Disp: 5 tablet, Rfl: 0    Laboratory Results:        Results for orders placed or performed in visit on 03/19/54   Basic metabolic panel   Result Value Ref Range    Sodium 140 136 - 145 mmol/L    Potassium 4 1 3 5 - 5 3 mmol/L    Chloride 107 100 - 108 mmol/L    CO2 27 21 - 32 mmol/L    Anion Gap 6 4 - 13 mmol/L    BUN 46 (H) 5 - 25 mg/dL    Creatinine 1 29 0 60 - 1 30 mg/dL    Glucose, Fasting 101 (H) 65 - 99 mg/dL    Calcium 9 2 8 3 - 10 1 mg/dL    eGFR 40 ml/min/1 73sq m   CBC   Result Value Ref Range    WBC 6 60 4 31 - 10 16 Thousand/uL    RBC 4 09 3 81 - 5 12 Million/uL    Hemoglobin 11 1 (L) 11 5 - 15 4 g/dL    Hematocrit 36 4 34 8 - 46 1 %    MCV 89 82 - 98 fL    MCH 27 1 26 8 - 34 3 pg    MCHC 30 5 (L) 31 4 - 37 4 g/dL    RDW 16 1 (H) 11 6 - 15 1 %    Platelets 802 915 - 994 Thousands/uL    MPV 11 8 8 9 - 12 7 fL   Phosphorus   Result Value Ref Range    Phosphorus 3 7 2 3 - 4 1 mg/dL   Protein / creatinine ratio, urine   Result Value Ref Range    Creatinine, Ur 114 0 mg/dL    Protein Urine Random 11 mg/dL    Prot/Creat Ratio, Ur 0 10 0 00 - 0 10   Urinalysis with microscopic   Result Value Ref Range    Clarity, UA Clear     Color, UA Yellow     Specific [place], UA 1 019 1 003 - 1 030    pH, UA 6 0 4 5 - 8 0    Glucose, UA Negative Negative mg/dl    Ketones, UA Negative Negative mg/dl    Blood, UA Negative Negative    Protein, UA Negative Negative mg/dl    Nitrite, UA Negative Negative    Bilirubin, UA Negative Negative    Urobilinogen, UA 0 2 0 2, 1 0 E U /dl E U /dl    Leukocytes, UA Moderate (A) Negative    WBC, UA 10-20 (A) None Seen, 0-5, 5-55, 5-65 /hpf    RBC, UA None Seen None Seen, 0-5 /hpf    Hyaline Casts, UA None Seen None Seen /lpf    Bacteria, UA Occasional None Seen, Occasional /hpf    Epithelial Cells Occasional None Seen, Occasional /hpf   Alkaline phosphatase   Result Value Ref Range    Alkaline Phosphatase 122 (H) 46 - 116 U/L

## 2018-07-25 NOTE — LETTER
July 25, 2018     Estefany Ahumada MD  [address]  [facility] [address]    Patient: Isaac Reed   YOB: 1940   Date of Visit: 7/25/2018       Dear Dr Judge Gacria:    Thank you for referring Abel Navarro to me for evaluation  Below are my notes for this consultation  If you have questions, please do not hesitate to call me  I look forward to following your patient along with you  Sincerely,        Julio Kc MD        CC: No Recipients  Julio Kc MD  7/25/2018 10:13 AM  Sign at close encounter  [address] [facility] 65 y o  female MRN: [id number]  7/25/2018    Reason for Visit: CKD III    ASSESSMENT and PLAN:    I had the pleasure of seeing Ms Maria Luz Dukes today in the renal clinic for the continued management of CKD III  Since our last visit, there has been no ER visits or hospitalizations  He currently has no complaints at this time and is feeling well  Patient denies any chest pain, shortness of breath and swelling  The last blood work was done on 7/12/18, which we have reviewed together  24-year-old female with a past medical history of chronic kidney disease, venous stasis, HTN, hypothyroidism, lumbar canal stenosis, Anxiety, GERD, neuropathy, Gout, EF 79-81%, Grade 2 diastolic dysfunction who presents for follow up for CKD  To note, patient's  has parkinson late state per patient     1) CKD - Prior year had nl Cr prior to aug 2016 and then had SHABANA during diarrhea episode  To note, patient also has a long standing history of HTN  Creatinine in 2013, 0 9 mg/dL  Cr early 2016 was 0 80-0 9 mg/dL; then increased starting in august 2016 to 2 3 mg/dL and has fluctuated since  CT scan in 2016, kidneys appearing normal at that time  UA in august was bland       Etiology of CKD may be long standing HTN and ATN  Baseline Cr ~ 1 1 -1 3 mg/dL     - Urine eos 0%;   - A1c 5 6% in nov  - UA july - small leuk/2-4 WBC;   - C4 nl 38; C3 nl 142     - Renal u/s with R 10 3 cm, L 10 4 cm, renal cortex 1 cm b/l; both kidneys slightly reduced in size; lower pole of R kidney is non obstructing calculus  - Pt follows with Urology team for nephrolithiasis  - No NSAIDs, the patient is not currently taking NSAID  - most recent Cr stable 1 29 mg/dL   -UA with 10-20 WBC- pt was treated for UTI 3 weeks ago  Now symptoms resolved and asymptomatic at time of this urine  No changes for now  -urine protein creatinine ratio minimal is 0 1  - labwork in 4 months, appt thereafter  - continue ARB  -patient has many stressors in her life mainly  is ill, approaching hospice care  This will take priority for the patient in the coming months, and therefore I advised the patient that we can see the patient in 5 months and if the patient needs anything in the interim can call any time     2) Volume - follows with Cardiology; has sign of edema today       - pt on bumetanide 1 mg once daily  - pt self doses extra dose if needed appropriately     3) HTN -      -orthostasis has improved  - continue amlodipine/olmesartan; bumex, spironolactone  - on propranolol     4) hypothyroid - as per Primary Care Physician     5) HPL - Primary Care following       6) Electrolytes - stable     7) MBD -  and repeat is 83 8 in february     - recheck vit D, phos, PTH     8) gout -      - on allopurinol  -patient only takes colchicine up to 3 doses max when has a gout flare    9) back pain - follows with Pain management     10) Colonoscopy in feb with internal hemorrhoids and polyp removed       11) alk phos elevation     - stable, but still elevated  - LFTs nl in may  - was started on allopurinol since last check when was normal  Could be related to allopurinol     12) Anemia - Hb stable    It was a pleasure evaluating your patient in the office today  Thank you for allowing our team to participate in the care of Ms Sahra Angeles   Please do not hesitate to contact our team if further issues/questions shall arise in the interim  No problem-specific Assessment & Plan notes found for this encounter  HPI:    Patient denies complaints  Does have edema today on exam, but states that did not take diuretics this morning given the appointment today  Edema is relatively unchanged  Denies shortness of breath   is ill and in a nursing home currently  PATIENT INSTRUCTIONS:    Patient Instructions   1) please do your labwork 1-2 weeks prior to appointment  2) no changes to your labwork        OBJECTIVE:  Current Weight: Weight - Scale: 96 6 kg (213 lb)  Vitals:    07/25/18 0936   BP: 128/50   BP Location: Right arm   Patient Position: Sitting   Cuff Size: Adult   Pulse: 66   Weight: 96 6 kg (213 lb)   Height: 5' 1" (1 549 m)    Body mass index is 40 25 kg/m²  REVIEW OF SYSTEMS:    Review of Systems   Constitutional: Negative  Negative for fatigue  HENT: Negative  Eyes: Negative  Respiratory: Negative  Negative for shortness of breath  Cardiovascular: Negative  Negative for leg swelling  Gastrointestinal: Negative  Endocrine: Negative  Genitourinary: Negative  Negative for difficulty urinating  Musculoskeletal: Negative  Skin: Negative  Allergic/Immunologic: Negative  Neurological: Negative  Hematological: Negative  Psychiatric/Behavioral: Negative  All other systems reviewed and are negative  PHYSICAL EXAM:      Physical Exam   Constitutional: She is oriented to person, place, and time  She appears well-developed and well-nourished  No distress  HENT:   Head: Normocephalic and atraumatic  Mouth/Throat: No oropharyngeal exudate  Eyes: Conjunctivae are normal  Right eye exhibits no discharge  Left eye exhibits no discharge  No scleral icterus  Neck: Normal range of motion  Neck supple  Cardiovascular: Normal rate and regular rhythm  Exam reveals no gallop and no friction rub  No murmur heard    Pulmonary/Chest: Effort normal and breath sounds normal  No respiratory distress  She has no wheezes  She has no rales  Abdominal: Soft  Bowel sounds are normal  She exhibits no distension  There is no tenderness  There is no rebound  Musculoskeletal: Normal range of motion  She exhibits edema  She exhibits no tenderness or deformity  1+ LE edema unchanged    Neurological: She is alert and oriented to person, place, and time  Coordination normal    Skin: Skin is warm and dry  No rash noted  She is not diaphoretic  No erythema  No pallor  Psychiatric: She has a normal mood and affect  Her behavior is normal  Judgment and thought content normal    Nursing note and vitals reviewed        Medications:    Current Outpatient Prescriptions:     allopurinol (ZYLOPRIM) 300 mg tablet, Take 1 tablet (300 mg total) by mouth every morning, Disp: 90 tablet, Rfl: 1    amlodipine-olmesartan (BLACK) 10-40 MG, Take 1 tablet by mouth daily, Disp: , Rfl:     aspirin 81 MG tablet, Take 81 mg by mouth every morning  , Disp: , Rfl:     bumetanide (BUMEX) 2 mg tablet, Take 2 mg by mouth every morning, Disp: , Rfl:     cholecalciferol (VITAMIN D3) 1,000 units tablet, Take 1 tablet by mouth daily, Disp: , Rfl:     colchicine (COLCRYS) 0 6 mg tablet, Take 0 6 mg by mouth daily as needed Only in gout flare   , Disp: , Rfl:     gabapentin (NEURONTIN) 300 mg capsule, Take 100 mg by mouth daily at bedtime, Disp: , Rfl:     levothyroxine 100 mcg tablet, Take 1 tablet (100 mcg total) by mouth daily, Disp: 90 tablet, Rfl: 1    Omega-3-acid Ethyl Esters & D3 1 & 1000 GM & UNIT KIT, Take 2 capsules by mouth 2 (two) times a day, Disp: , Rfl:     omeprazole (PriLOSEC) 20 mg delayed release capsule, Take 1 capsule (20 mg total) by mouth every morning, Disp: 90 capsule, Rfl: 1    propranolol (INDERAL) 80 mg tablet, Take 80 mg by mouth 2 (two) times a day , Disp: , Rfl:     spironolactone (ALDACTONE) 25 mg tablet, Take 1 tablet (25 mg total) by mouth daily, Disp: 90 tablet, Rfl: 1    diclofenac sodium (VOLTAREN) 1 %, Apply 2 g topically 4 (four) times a day, Disp: 100 g, Rfl: 0    predniSONE 10 mg tablet, Take 1 tablet (10 mg total) by mouth daily, Disp: 5 tablet, Rfl: 0    Laboratory Results:        Results for orders placed or performed in visit on 34/23/67   Basic metabolic panel   Result Value Ref Range    Sodium 140 136 - 145 mmol/L    Potassium 4 1 3 5 - 5 3 mmol/L    Chloride 107 100 - 108 mmol/L    CO2 27 21 - 32 mmol/L    Anion Gap 6 4 - 13 mmol/L    BUN 46 (H) 5 - 25 mg/dL    Creatinine 1 29 0 60 - 1 30 mg/dL    Glucose, Fasting 101 (H) 65 - 99 mg/dL    Calcium 9 2 8 3 - 10 1 mg/dL    eGFR 40 ml/min/1 73sq m   CBC   Result Value Ref Range    WBC 6 60 4 31 - 10 16 Thousand/uL    RBC 4 09 3 81 - 5 12 Million/uL    Hemoglobin 11 1 (L) 11 5 - 15 4 g/dL    Hematocrit 36 4 34 8 - 46 1 %    MCV 89 82 - 98 fL    MCH 27 1 26 8 - 34 3 pg    MCHC 30 5 (L) 31 4 - 37 4 g/dL    RDW 16 1 (H) 11 6 - 15 1 %    Platelets 428 156 - 929 Thousands/uL    MPV 11 8 8 9 - 12 7 fL   Phosphorus   Result Value Ref Range    Phosphorus 3 7 2 3 - 4 1 mg/dL   Protein / creatinine ratio, urine   Result Value Ref Range    Creatinine, Ur 114 0 mg/dL    Protein Urine Random 11 mg/dL    Prot/Creat Ratio, Ur 0 10 0 00 - 0 10   Urinalysis with microscopic   Result Value Ref Range    Clarity, UA Clear     Color, UA Yellow     Specific [place], UA 1 019 1 003 - 1 030    pH, UA 6 0 4 5 - 8 0    Glucose, UA Negative Negative mg/dl    Ketones, UA Negative Negative mg/dl    Blood, UA Negative Negative    Protein, UA Negative Negative mg/dl    Nitrite, UA Negative Negative    Bilirubin, UA Negative Negative    Urobilinogen, UA 0 2 0 2, 1 0 E U /dl E U /dl    Leukocytes, UA Moderate (A) Negative    WBC, UA 10-20 (A) None Seen, 0-5, 5-55, 5-65 /hpf    RBC, UA None Seen None Seen, 0-5 /hpf    Hyaline Casts, UA None Seen None Seen /lpf    Bacteria, UA Occasional None Seen, Occasional /hpf    Epithelial Cells Occasional None Seen, Occasional /hpf   Alkaline phosphatase   Result Value Ref Range    Alkaline Phosphatase 122 (H) 46 - 116 U/L

## 2018-09-11 DIAGNOSIS — I10 HTN (HYPERTENSION), MALIGNANT: Primary | ICD-10-CM

## 2018-09-11 DIAGNOSIS — I10 BENIGN ESSENTIAL HTN: Primary | ICD-10-CM

## 2018-09-11 RX ORDER — PROPRANOLOL HYDROCHLORIDE 80 MG/1
TABLET ORAL
Qty: 90 TABLET | Refills: 3 | Status: SHIPPED | OUTPATIENT
Start: 2018-09-11 | End: 2019-09-21 | Stop reason: SDUPTHER

## 2018-09-12 RX ORDER — AMLODIPINE AND OLMESARTAN MEDOXOMIL 10; 40 MG/1; MG/1
TABLET ORAL
Qty: 90 TABLET | Refills: 3 | Status: SHIPPED | OUTPATIENT
Start: 2018-09-12 | End: 2019-02-26

## 2018-10-02 ENCOUNTER — OFFICE VISIT (OUTPATIENT)
Dept: PODIATRY | Facility: CLINIC | Age: 78
End: 2018-10-02
Payer: MEDICARE

## 2018-10-02 VITALS
WEIGHT: 213 LBS | HEIGHT: 61 IN | RESPIRATION RATE: 17 BRPM | DIASTOLIC BLOOD PRESSURE: 67 MMHG | BODY MASS INDEX: 40.22 KG/M2 | SYSTOLIC BLOOD PRESSURE: 132 MMHG | HEART RATE: 71 BPM

## 2018-10-02 DIAGNOSIS — M54.16 RADICULOPATHY OF LUMBAR REGION: ICD-10-CM

## 2018-10-02 DIAGNOSIS — B35.1 ONYCHOMYCOSIS: ICD-10-CM

## 2018-10-02 DIAGNOSIS — M79.672 PAIN IN BOTH FEET: Primary | ICD-10-CM

## 2018-10-02 DIAGNOSIS — I70.209 ATHEROSCLEROSIS OF ARTERIES OF EXTREMITIES (HCC): ICD-10-CM

## 2018-10-02 DIAGNOSIS — L84 CORNS: ICD-10-CM

## 2018-10-02 DIAGNOSIS — M79.671 PAIN IN BOTH FEET: Primary | ICD-10-CM

## 2018-10-02 PROCEDURE — 99212 OFFICE O/P EST SF 10 MIN: CPT | Performed by: PODIATRIST

## 2018-10-02 PROCEDURE — 11056 PARNG/CUTG B9 HYPRKR LES 2-4: CPT | Performed by: PODIATRIST

## 2018-10-02 RX ORDER — GABAPENTIN 300 MG/1
300 CAPSULE ORAL
Qty: 90 CAPSULE | Refills: 0 | Status: SHIPPED | OUTPATIENT
Start: 2018-10-02 | End: 2018-12-29 | Stop reason: SDUPTHER

## 2018-10-02 NOTE — PROGRESS NOTES
Procedures   Foot Exam         Foot Exam    Chief Complaint   feet care      History of Present Illness   HPI: Patient complains of pain in her feet with ambulation  She is pain around the big toe joints  There is no history of trauma  Today the patient is concerned with her swollen legs  She does not have pain in her legs, however, they are itchy and red  She suffers from edema  She does take water pill  Patient is now been diagnosed with renal compromise       Review of Systems        Constitutional: No fever, no chills, feels well, no tiredness, no recent weight gain or loss       Eyes: No complaints of eyesight problems, no red eyes       ENT: no loss of hearing, no nosebleeds, no sore throat       Cardiovascular: No complaints of chest pain, no palpitations, no leg claudication or lower extremity edema       Respiratory: no compliants of shortness of breath, no wheezing, no cough       Gastrointestinal: no complaints of abdominal pain, no constipation, no nausea or diarrhea, no vomiting, no bloody stools       Genitourinary: no complaints of dysuria, no incontinence       Musculoskeletal: arthralgias,-- limb pain-- and-- myalgias, but-- as noted in HPI       Integumentary: no complaints of skin rash or lesion, no itching or dry skin, no skin wounds       Neurological: numbness-- and-- tingling, but-- no complaints of headache, no confusion, no numbness or tingling, no dizziness-- and-- as noted in HPI       Endocrine: No complaints of muscle weakness, no feelings of weakness, no frequent urination, no excessive thirst-- and-- as noted in HPI   feelings of weakness      Psychiatric: No suicidal thoughts, no anxiety, no feelings of depression       Active Problems   1  Abdominal pain (789 00) (R10 9)   2  Acute bronchitis (466 0) (J20 9)   3  Acute on chronic diastolic congestive heart failure (428 33,428 0) (I50 33)   4  Ankle pain, unspecified laterality   5  Arthropathy of knee (716 96) (M17 10)   6  Atherosclerosis of arteries of extremities (440 20) (I70 209)   7  Benign essential hypertension (401 1) (I10)   8  Breast pain (611 71) (N64 4)   9  Bunion, unspecified laterality   10  Callus (700) (L84)   11  Cellulitis (682 9) (L03 90)   12  Chronic low back pain (724 2,338 29) (M54 5,G89 29)   13  Chronic reflux esophagitis (530 11) (K21 0)   14  Chronic venous insufficiency (459 81) (I87 2)   15  CKD (chronic kidney disease), stage III (585 3) (N18 3)   92  MGFVXNDB systolic and diastolic HF (heart failure) (428 40) (I50 40)   17  Dehydration (276 51) (E86 0)   18  Dermatitis (692 9) (L30 9)   19  Difficulty in walking (719 7) (R26 2)   20  Discoloration of nail (703 8) (L60 8)   21  Diverticulitis of colon (562 11) (K57 32)   22  Dizziness (780 4) (R42)   23  Dyspnea on exertion (786 09) (R06 09)   24  Dystrophic nail (703 8) (L60 3)   25  Dysuria (788 1) (R30 0)   26  Edema (782 3) (R60 9)   27  Electrolyte or fluid disorder (276 9) (E87 8)   28  Elevated triglycerides with high cholesterol (272 2) (E78 2)   29  Encounter for screening for cardiovascular disorders (V81 2) (Z13 6)   30  Encounter for screening mammogram for breast cancer (V76 12) (Z12 31)   31  Esophageal reflux (530 81) (K21 9)   32  Flu vaccine need (V04 81) (Z23)   33  Flu vaccine need (V04 81) (Z23)   34  Gout (274 9) (M10 9)   35  Headache (784 0) (R51)   36  Hiatal hernia (553 3) (K44 9)   37  Hyperlipemia (272 4) (E78 5)   38  Hypertension (401 9) (I10)   39  Hyperuricemia (790 6) (E79 0)   40  Hypothyroidism (244 9) (E03 9)   41  Infectious diarrhea (009 2) (A09)   42  Knee pain (719 46) (M25 569)   43  Leg swelling (729 81) (M79 89)   44  Localized osteoarthritis of knees, bilateral (715 36) (M17 0)   45  Lumbar canal stenosis (724 02) (M48 061)   46  Lumbar disc herniation with radiculopathy (722 10) (M51 16)   47  Lumbar radiculopathy (724 4) (M54 16)   48  Lumbar stenosis with neurogenic claudication (724 03) (M48 062)   49  Lymphedema (457 1) (I89 0)   50  Medicare annual wellness visit, initial (V70 0) (Z00 00)   51  Medicare annual wellness visit, subsequent (V70 0) (Z00 00)   52  Morbid obesity with body mass index of 40 0-44 9 in adult (278 01,V85 41)      (E66 01,Z68 41)   53  Nails Onychauxis (703 8)   54  Nausea (787 02) (R11 0)   55  Need for pneumococcal vaccination (V03 82) (Z23)   56  Need for prophylactic vaccination and inoculation against influenza (V04 81) (Z23)   57  Need for shingles vaccine (V04 89) (Z23)   58  Nephrolithiasis (592 0) (N20 0)   59  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)   60  Obstructive sleep apnea (327 23) (G47 33)   61  Onychomycosis (110 1) (B35 1)   62  Orthopnea (786 02) (R06 01)   63  Osteoarthritis, localized, knee (715 36) (M17 10)   64  Osteoporosis screening (V82 81) (Z13 820)   65  Other specified anxiety disorders (300 09) (F41 8)   66  Other specified inflammatory spondylopathies, 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) (M46 80)   67  Overweight (278 02) (E66 3)   68  Pain in both feet (729 5) (M79 671,M79 672)   69  Pain in wrist joint (719 43) (M25 539)   70  Palpitations (785 1) (R00 2)   71  Pes planus, unspecified laterality (734) (M21 40)   72  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)   73  Pseudogout of left wrist (318 88,434 98) (M11 232)   74  Renal disorder (593 9) (N28 9)   75  Rupture of popliteal cyst (727 51) (M66 0)   76  Screening for colon cancer (V76 51) (Z12 11)   77  Screening for diabetes mellitus (DM) (V77 1) (Z13 1)   78  Screening for genitourinary condition (V81 6) (Z13 89)   79  Screening for malignant neoplasm of breast (V76 10) (Z12 31)   80  Short unilateral neuralgiform headache, conjunctival injection/tearing (339 05) (G44 059)   81  Somnolence, daytime (780 54) (R40 0)   82  Spinal stenosis of lumbar region (724 02) (M48 061)   83  Tarsal tunnel syndrome (355 5) (G57 50)   84  Tenderness in limb (729 5) (M79 609)   85  Tinea pedis (110 4) (N30 0)   86  Umbilical hernia (611 6) (K42 9)   87  Visit for screening mammogram (V76 12) (Z12 31)     Past Medical History    · Benign essential hypertension (401 1) (I10)   · Depression screen (V79 0) (Z13 89)   · Hiatal hernia (553 3) (K44 9)   · History of abdominal pain (V13 89) (S42 064)   · History of abdominal pain (V13 89) (G49 292)   · History of arthritis (V13 4) (Z87 39)   · History of backache (V13 59) (Z87 39)   · History of cataract (V12 49) (Z86 69)   · History of eczema (V13 3) (Z87 2)   · History of heart failure (V12 59) (Z86 79)   · History of hepatitis (V12 09) (Z86 19)   · History of onychomycosis (V12 09) (Z86 19)   · History of sleep apnea (V13 89) (Z86 69)   · History of Orthopnea (786 02) (R06 01)     The active problems and past medical history were reviewed and updated today       Surgical History    · History of Complete Colonoscopy   · History of Hysterectomy   · History of Incisional Hernia Repair - Incarcerated   · History of Knee Surgery     The surgical history was reviewed and updated today        Family History   Mother    · Family history of Coronary artery disease   · Family history of arthritis (V17 7) (Z82 61)   · Denied: Family history of depression   · Family history of diabetes mellitus (V18 0) (Z83 3)   · Family history of hypertension (V17 49) (Z82 49)   · Denied: Family history of substance abuse  Father    · Denied: Family history of depression   · Family history of hypertension (V17 49) (Z82 49)   · Denied: Family history of substance abuse  Sibling    · Denied: Family history of depression   · Denied: Family history of substance abuse  Family History    · Family history of arthritis (V17 7) (Z82 61)     The family history was reviewed and updated today        Social History    · Denied: History of Alcohol Use (History)   · Daily Coffee Consumption (___ Cups/Day)   · Lack of exercise (V69 0) (Z72 3)   ·    · Never a smoker   · Never a smoker   · Sleeps 6 -7 hours a day  The social history was reviewed and updated today   The social history was reviewed and is unchanged       Current Meds    1  Allopurinol 300 MG Oral Tablet; take one tablet by mouth every day;     Therapy: 83QSM8923 to (Evaluate:28Gam5830)  Requested for: 72ZXO7025; Last     Rx:56Ayx1439 Ordered   2  Amlodipine-Olmesartan 5-20 MG Oral Tablet; TAKE 1 TABLET ONCE DAILY;     Therapy: 33ULY4278 to (Sima Blue)  Requested for: 29Wpz8742; Last     Rx:83Ysk6623 Ordered   3  Aspirin 81 MG Oral Tablet Chewable;     Therapy: (Recorded:77Nyb2471) to Recorded   4  Bumetanide 2 MG Oral Tablet; TAKE 1 TABLET DAILY;     Therapy: 58VOF3839 to (Evaluate:25Smf5965)  Requested for: 59OWF5969; Last     Rx:28Oze2547 Ordered   5  Colchicine 0 6 MG Oral Tablet; TAKE 1 TABLET DAILY AS DIRECTED;     Therapy: 99SUF5970 to (Evaluate:86Fij7680)  Requested for: 38QGE7400; Last     Rx:45Kpy5751 Ordered   6  Gabapentin 300 MG Oral Capsule; TAKE 1 CAPSULE AT BEDTIME;     Therapy: 69OIP8539 to (Evaluate:67Iaa3435)  Requested for: 09KVA8683; Last     Rx:72Env0855 Ordered   7  Levothyroxine Sodium 100 MCG Oral Tablet; 1 TAb PO Daily in AM;     Therapy: 44ZFJ8815 to (Last Rx:82Qws2420)  Requested for: 78GNU3227 Ordered   8  Omega-3-acid Ethyl Esters 1 GM Oral Capsule; take 2 capsules by mouth twice a day;     Therapy: 46SUV1671 to (Evaluate:84Fca0355)  Requested for: 87Fsb4487; Last     Rx:36Jtp8118 Ordered   9  Omeprazole 20 MG Oral Capsule Delayed Release; TAKE 1 CAPSULE EVERY DAY AS     DIRECTED  BY  PHYSICIAN;     Therapy: 65OKD8178 to (Evaluate:28Tua4962)  Requested for: 73ARK3559; Last     Rx:[id number] Ordered   10  Propranolol HCl - 80 MG Oral Tablet; TAKE 1/2 TABLET EVERY 12 HOURS DAILY;      Therapy: 78XGJ5074 to (Last Rx:34Doh1597)  Requested for: 28Jgj3711 Ordered   11  Spironolactone 25 MG Oral Tablet; TAKE 1 TABLET DAILY;      Therapy: 07SNK3196 to Recorded   12  Vitamin D3 TABS; Take 1 tablet daily;      Therapy: (Recorded:02Jun2017) to Recorded   13  Zoster (Zostavax); INJECT 0 5  ML Subcutaneous;      Therapy: 32IQH7515 to (Last Rx:15Nov2017) Ordered     The medication list was reviewed and updated today       Allergies   1  No Known Drug Allergies     Physical Exam   Left Foot: Appearance: Normal except as noted: excessive pronation-- and-- pes planus     Right Foot: Appearance: Normal except as noted: excessive pronation-- and-- pes planus  Tenderness: None except the calcaneous,-- medial calcaneous-- and-- insertion of the plantar fascia     Left Ankle: Appearance: Normal except ecchymosis-- and-- swelling laterally  ROM: limited ROM in all planes    Right Ankle: ROM: limited ROM in all planes Motor: diffuse weakness     Neurological Exam: performed  Light touch was intact bilaterally  Vibratory sensation was intact bilaterally  Response to monofilament test was intact bilaterally  Deep tendon reflexes: patellar reflex present bilaterally-- and-- achilles reflex present bilaterally     Vascular Exam: performed Dorsalis pedis pulses were 1/4 bilaterally  Posterior tibial pulses were 1/4 bilaterally  Elevation Pallor: diminished bilaterally  Capillary refill time was greater than 3 seconds bilaterally-- and-- Q9 findings bilateral  Negative digital hair noted  Positive abnormal cooling bilateral  Edema: moderate bilaterally-- and-- 6/7 pitting edema  Negative Homans sign     Toenails: All of the toenails were elongated,-- hypertrophied,-- discolored-- and-- Mycotic with onychogryphosis  Note is made of bilateral tinea pedis in moccasin foot  Distribution          Socks and shoes removed, Right Foot Findings: swollen, erythematous and dry       The sensory exam showed diminished vibratory sensation at the level of the toes   Diminished tactile sensation with monofilament testing throughout the right foot       Socks and shoes removed, Left Foot Findings: swollen, erythematous and dry       The sensory exam showed diminished vibratory sensation at the level of the toes  Diminished tactile sensation with monofilament testing throughout the left foot      Capillary refills findings on the right were delayed in the toes       Pulses:      1+ in the posterior tibialis on the right      1+ in the dorsalis pedis on the right       Capillary refills findings on the left were delayed in the toes       Pulses:      1+ in the posterior tibialis on the left      1+ in the dorsalis pedis on the left       Assign Risk Category: 2: Loss of protective sensation with or without weakness, deformity, callus, pre-ulcer, or history of ulceration  High risk     Hyperkeratosis: present on both first toes,-- present on both first sub metatarsals-- and-- Bilateral plantar moccasin tinea pedis noted     Shoe Gear Evaluation: performed ()  Recommendation(s): SAS style       Procedure   All mycotic nails debrided today  Bilateral pre-ulcerative lesions debrided today   Procedures performed without pain or complication

## 2018-10-05 ENCOUNTER — EVALUATION (OUTPATIENT)
Dept: PHYSICAL THERAPY | Facility: CLINIC | Age: 78
End: 2018-10-05
Payer: MEDICARE

## 2018-10-05 ENCOUNTER — TRANSCRIBE ORDERS (OUTPATIENT)
Dept: PHYSICAL THERAPY | Facility: CLINIC | Age: 78
End: 2018-10-05

## 2018-10-05 ENCOUNTER — IMMUNIZATION (OUTPATIENT)
Dept: FAMILY MEDICINE CLINIC | Facility: CLINIC | Age: 78
End: 2018-10-05
Payer: MEDICARE

## 2018-10-05 DIAGNOSIS — R26.89 BALANCE DISORDER: ICD-10-CM

## 2018-10-05 DIAGNOSIS — M54.16 RADICULOPATHY OF LUMBAR REGION: Primary | ICD-10-CM

## 2018-10-05 DIAGNOSIS — Z23 ENCOUNTER FOR IMMUNIZATION: ICD-10-CM

## 2018-10-05 DIAGNOSIS — R26.2 DIFFICULTY IN WALKING: ICD-10-CM

## 2018-10-05 PROCEDURE — G8985 CARRY GOAL STATUS: HCPCS | Performed by: PHYSICAL MEDICINE & REHABILITATION

## 2018-10-05 PROCEDURE — 90662 IIV NO PRSV INCREASED AG IM: CPT | Performed by: FAMILY MEDICINE

## 2018-10-05 PROCEDURE — G0008 ADMIN INFLUENZA VIRUS VAC: HCPCS | Performed by: FAMILY MEDICINE

## 2018-10-05 PROCEDURE — G8984 CARRY CURRENT STATUS: HCPCS | Performed by: PHYSICAL MEDICINE & REHABILITATION

## 2018-10-05 PROCEDURE — 97162 PT EVAL MOD COMPLEX 30 MIN: CPT | Performed by: PHYSICAL MEDICINE & REHABILITATION

## 2018-10-05 PROCEDURE — 97112 NEUROMUSCULAR REEDUCATION: CPT | Performed by: PHYSICAL MEDICINE & REHABILITATION

## 2018-10-05 NOTE — LETTER
2018    oSfya Amaro, [address] [facility] [address]    Patient: Ham Price   YOB: 1940   Date of Visit: 10/5/2018     Encounter Diagnosis     ICD-10-CM    1  Radiculopathy of lumbar region M54 16    2  Difficulty in walking R26 2    3  Balance disorder R26 89        Dear Dr Krystal Lyons:    Please review the attached Plan [facility]  Please verify that you agree therapy should continue by signing the attached document and sending it back to our office  If you have any questions or concerns, please don't hesitate to call  Sincerely,    Boone Cole      Referring Provider:      I certify that I have read the below Plan of Care and certify the need for these services furnished under this plan of treatment while under my care  Sofya Amaro DPM  [address] [facility] [place] [address]: [phone]          PT Evaluation     Today's date: 10/5/2018  Patient name: Ham Price  : 1940  MRN: [id number]  Referring provider: Claude Watts DPM  Dx:   Encounter Diagnosis     ICD-10-CM    1  Radiculopathy of lumbar region M54 16    2  Difficulty in walking R26 2    3  Balance disorder R26 89                   Assessment  Impairments: abnormal coordination, abnormal gait, abnormal muscle firing, abnormal muscle tone, abnormal or restricted ROM, abnormal movement, activity intolerance, impaired balance, impaired physical strength, lacks appropriate home exercise program, pain with function and poor body mechanics    Assessment details: Ham Price is a pleasant 65 y o  female who presents with symptoms consistent with lumbar radiculopathy, balance deficits and lumbar stenosis  The patient's greatest concerns are fear of not being able to keep active      Pt was referred for lumbar radiculopathy  Primary movement impairment diagnosis of lumbar anterior derangement resulting in pathoanatomical symptoms of Radiculopathy of lumbar region  (primary encounter diagnosis)  Difficulty in walking  Balance disorder and limiting her ability to walk around her house, perform chores and grocery shop  Impairments include:  1) repeated extension - addressing with mobs and repeated flexion mobility exercises  2) poor lifting mechanics - addressing with functional retraining  3) poor lumbopelvic movement coordination - addressing with neuromotor retraining  4) poor walking tolerance - addressing with progressive conditioning exercises &body weight supported treadmill training  Etiologic factors include chronic history of back pain  Understanding of Dx/Px/POC: fair   Prognosis: fair  Prognosis details: Positive prognostic indicators include positive attitude toward recovery  Negative prognostic indicators include chronicity of symptoms, hypertension, high symptom irritability and high BMI  Goals  Patient will be independent with home exercise program    Patient will be able to manage symptoms independently  Patient will be able to stand for 10 minutes without limitation due to pain  Patient will be able to walk around her house without limitation due to pain  Patient will be able to squat to  objects from the floor without limitation due to pain      Plan  Patient would benefit from: skilled PT  Referral necessary: No  Planned modality interventions: thermotherapy: hydrocollator packs, cryotherapy and TENS  Planned therapy interventions: abdominal trunk stabilization, activity modification, joint mobilization, manual therapy, motor coordination training, neuromuscular re-education, patient education, self care, therapeutic activities, therapeutic exercise, graded activity, home exercise program, behavior modification, gait training, transfer training and postural training  Frequency: 2x week  Duration in weeks: 8  Treatment plan discussed with: patient  Plan details: Prognosis above is given PT services 2x/week tapering to 1x/week over the next 2 months and home program adherence  Subjective Evaluation    History of Present Illness  Mechanism of injury: Work/School: retired,    Home Life: cleaning, mostly sedentary, sits to use the vacuum   Hobbies/exercise: does not exericse  Pain location: right side lower back, gluteal region and hamstring  HPI: she reports she has had this pain for the last 5 years, as per patient report she had a MRI that showed lumbar stenosis, she reports she has had two epidural shots which she reports was not helpful  Aggravating factors: standing, walking, standing out of a chair  Relieving factors: sitting, laying down, using a cart at a store  PMH: high blood pressure, as per patient's report 9 "small operations" cataracts, carpal tunnel,  Pain  Current pain ratin  At best pain ratin  At worst pain ratin    Patient Goals  Patient goal: be able to walk farther, be able to walk around her house a little bit        Objective     Static Posture     Comments  ROM:  Flexion: 50% limited  Extension: 75% limited with right rotation    Step up: Increased forward flexion and decreased balance    Squats:  Decreased right weight bearing, increased forward flexion    SLR:  R: 60  L: 30    MMTs:  Hip flexion: R: 4-/5 L: 4-/5  Knee ext: R: 4-/5, L: 4-/5  Knee flex: R: 3+/5, L: 3+/5  DF: R: 4/5, L: 4/5  PF: R: 3/5 L: 3/5    Romberg:  Feet together: 22sec  Eyes closed: unable to perform - further testing was not performed due to inability to pass eyes closed feet together          Flowsheet Rows      Most Recent Value   PT/OT G-Codes   Current Score  40   Projected Score  51   Assessment Type  Evaluation   G code set  Carrying, Moving & Handling Objects   Carrying, Moving and Handling Objects Current Status ()  CL   Carrying, Moving and Handling Objects Goal Status ()  CH          Precautions: hypertension,     Daily Treatment Diary Manual  10/5/2018            [facility]                                                                     Exercise Diary              Recumbent bike             *Seated Flexion             *TrA contraction             TrA leg fall outs             *SLR - delia             Bridges                                                                                                                                                                                      HEP Teaching and return Demonstration 10min                Modalities

## 2018-10-05 NOTE — PROGRESS NOTES
PT Evaluation     Today's date: 10/5/2018  Patient name: Yoko Price  : 1940  MRN: [id number]  Referring provider: Ana Blackwell DPM  Dx:   Encounter Diagnosis     ICD-10-CM    1  Radiculopathy of lumbar region M54 16    2  Difficulty in walking R26 2    3  Balance disorder R26 89                   Assessment  Impairments: abnormal coordination, abnormal gait, abnormal muscle firing, abnormal muscle tone, abnormal or restricted ROM, abnormal movement, activity intolerance, impaired balance, impaired physical strength, lacks appropriate home exercise program, pain with function and poor body mechanics    Assessment details: Yoko Price is a pleasant 65 y o  female who presents with symptoms consistent with lumbar radiculopathy, balance deficits and lumbar stenosis  The patient's greatest concerns are fear of not being able to keep active  Pt was referred for lumbar radiculopathy  Primary movement impairment diagnosis of lumbar anterior derangement resulting in pathoanatomical symptoms of Radiculopathy of lumbar region  (primary encounter diagnosis)  Difficulty in walking  Balance disorder and limiting her ability to walk around her house, perform chores and grocery shop  Impairments include:  1) repeated extension - addressing with mobs and repeated flexion mobility exercises  2) poor lifting mechanics - addressing with functional retraining  3) poor lumbopelvic movement coordination - addressing with neuromotor retraining  4) poor walking tolerance - addressing with progressive conditioning exercises &body weight supported treadmill training  Etiologic factors include chronic history of back pain  Understanding of Dx/Px/POC: fair   Prognosis: fair  Prognosis details: Positive prognostic indicators include positive attitude toward recovery  Negative prognostic indicators include chronicity of symptoms, hypertension, high symptom irritability and high BMI        Goals  Patient will be independent with home exercise program    Patient will be able to manage symptoms independently  Patient will be able to stand for 10 minutes without limitation due to pain  Patient will be able to walk around her house without limitation due to pain  Patient will be able to squat to  objects from the floor without limitation due to pain  Plan  Patient would benefit from: skilled PT  Referral necessary: No  Planned modality interventions: thermotherapy: hydrocollator packs, cryotherapy and TENS  Planned therapy interventions: abdominal trunk stabilization, activity modification, joint mobilization, manual therapy, motor coordination training, neuromuscular re-education, patient education, self care, therapeutic activities, therapeutic exercise, graded activity, home exercise program, behavior modification, gait training, transfer training and postural training  Frequency: 2x week  Duration in weeks: 8  Treatment plan discussed with: patient  Plan details: Prognosis above is given PT services 2x/week tapering to 1x/week over the next 2 months and home program adherence          Subjective Evaluation    History of Present Illness  Mechanism of injury: Work/School: retired,    Home Life: cleaning, mostly sedentary, sits to use the vacuum   Hobbies/exercise: does not exericse  Pain location: right side lower back, gluteal region and hamstring  HPI: she reports she has had this pain for the last 5 years, as per patient report she had a MRI that showed lumbar stenosis, she reports she has had two epidural shots which she reports was not helpful  Aggravating factors: standing, walking, standing out of a chair  Relieving factors: sitting, laying down, using a cart at a store  PMH: high blood pressure, as per patient's report 9 "small operations" cataracts, carpal tunnel,  Pain  Current pain ratin  At best pain ratin  At worst pain ratin    Patient Goals  Patient goal: be able to walk farther, be able to walk around her house a little bit        Objective     Static Posture     Comments  ROM:  Flexion: 50% limited  Extension: 75% limited with right rotation    Step up: Increased forward flexion and decreased balance    Squats:  Decreased right weight bearing, increased forward flexion    SLR:  R: 60  L: 30    MMTs:  Hip flexion: R: 4-/5 L: 4-/5  Knee ext: R: 4-/5, L: 4-/5  Knee flex: R: 3+/5, L: 3+/5  DF: R: 4/5, L: 4/5  PF: R: 3/5 L: 3/5    Romberg:  Feet together: 22sec  Eyes closed: unable to perform - further testing was not performed due to inability to pass eyes closed feet together          Flowsheet Rows      Most Recent Value   PT/OT G-Codes   Current Score  40   Projected Score  51   Assessment Type  Evaluation   G code set  Carrying, Moving & Handling Objects   Carrying, Moving and Handling Objects Current Status ()  CL   Carrying, Moving and Handling Objects Goal Status ()  CH          Precautions: hypertension,     Daily Treatment Diary     Manual  10/5/2018            [facility]                                                                     Exercise Diary              Recumbent bike             *Seated Flexion             *TrA contraction             TrA leg fall outs             *SLR - delia             Bridges                                                                                                                                                                                      HEP Teaching and return Demonstration 10min                Modalities

## 2018-10-08 ENCOUNTER — OFFICE VISIT (OUTPATIENT)
Dept: PHYSICAL THERAPY | Facility: CLINIC | Age: 78
End: 2018-10-08
Payer: MEDICARE

## 2018-10-08 DIAGNOSIS — M54.16 RADICULOPATHY OF LUMBAR REGION: Primary | ICD-10-CM

## 2018-10-08 DIAGNOSIS — R26.89 BALANCE DISORDER: ICD-10-CM

## 2018-10-08 DIAGNOSIS — R26.2 DIFFICULTY IN WALKING: ICD-10-CM

## 2018-10-08 PROCEDURE — 97112 NEUROMUSCULAR REEDUCATION: CPT | Performed by: PHYSICAL MEDICINE & REHABILITATION

## 2018-10-08 PROCEDURE — 97110 THERAPEUTIC EXERCISES: CPT | Performed by: PHYSICAL MEDICINE & REHABILITATION

## 2018-10-08 PROCEDURE — 97140 MANUAL THERAPY 1/> REGIONS: CPT | Performed by: PHYSICAL MEDICINE & REHABILITATION

## 2018-10-08 NOTE — PROGRESS NOTES
Daily Note     Today's date: 10/8/2018  Patient name: Donna Fine  : 1940  MRN: [id number]  Referring provider: Miquel Laird DPM  Dx:   Encounter Diagnosis     ICD-10-CM    1  Radiculopathy of lumbar region M54 16    2  Difficulty in walking R26 2    3  Balance disorder R26 89                 Subjective: Rosalind reported "I don't have pain while doing the exercises but when I am walking around I have 5/10 pain for today "    Objective: See treatment diary below  Precautions: hypertension,     Daily Treatment Diary     Manual  10/5/2018 10/8/2018           [facility]  10min                                                                   Exercise Diary              Recumbent bike  5min           *Seated Flexion  3x10           *TrA contraction  3x12           TrA leg fall outs  3x15           *SLR - delia  2x10x2           Bridges                                                                                                                                                                                      HEP Teaching and return Demonstration 10min                Modalities                                                         Assessment: Tolerated treatment well  Patient would benefit from continued PT  Nanda Rodas was able to initiate her therapy program which shows progress towards her goals  She needed verbal and tactile cueing for her HEP exercises during the session  She reported less pain following her seated flexion  Plan: Continue per plan of care

## 2018-10-12 ENCOUNTER — OFFICE VISIT (OUTPATIENT)
Dept: PHYSICAL THERAPY | Facility: CLINIC | Age: 78
End: 2018-10-12
Payer: MEDICARE

## 2018-10-12 DIAGNOSIS — M54.16 RADICULOPATHY OF LUMBAR REGION: Primary | ICD-10-CM

## 2018-10-12 DIAGNOSIS — R26.89 BALANCE DISORDER: ICD-10-CM

## 2018-10-12 DIAGNOSIS — R26.2 DIFFICULTY IN WALKING: ICD-10-CM

## 2018-10-12 PROCEDURE — 97112 NEUROMUSCULAR REEDUCATION: CPT | Performed by: PHYSICAL MEDICINE & REHABILITATION

## 2018-10-12 PROCEDURE — 97110 THERAPEUTIC EXERCISES: CPT | Performed by: PHYSICAL MEDICINE & REHABILITATION

## 2018-10-12 NOTE — PROGRESS NOTES
Daily Note     Today's date: 10/12/2018  Patient name: Rossana Nuñez  : 1940  MRN: [id number]  Referring provider: Pavel Mcnulty DPM  Dx:   Encounter Diagnosis     ICD-10-CM    1  Radiculopathy of lumbar region M54 16    2  Difficulty in walking R26 2    3  Balance disorder R26 89                   Subjective: Rosalind reported "I feel not too bad today "      Objective: See treatment diary below  Precautions: hypertension,     Daily Treatment Diary     Manual  10/5/2018 10/8/2018 10/12/2018          [facility]  10min 10min                                                                  Exercise Diary              Recumbent bike  5min 5min          *Seated Flexion  3x10 3x10          *TrA contraction  3x12 3x12          TrA leg fall outs  3x15 3x15          *SLR - delia  2x10x2 3x10x2          Bridges   3x10                                                                                                                                                                                   HEP Teaching and return Demonstration 10min                Modalities                                                         Assessment: Tolerated treatment well  Patient would benefit from continued PT  Ben Michelle was able to add bridges to her therapy program today which shows progress towards her goals  She needed verbal cueing for SLRs and flexion in seated  Plan: Continue per plan of care

## 2018-10-13 DIAGNOSIS — E03.9 HYPOTHYROIDISM, UNSPECIFIED TYPE: ICD-10-CM

## 2018-10-13 DIAGNOSIS — I10 ESSENTIAL HYPERTENSION: Primary | ICD-10-CM

## 2018-10-15 ENCOUNTER — OFFICE VISIT (OUTPATIENT)
Dept: PHYSICAL THERAPY | Facility: CLINIC | Age: 78
End: 2018-10-15
Payer: MEDICARE

## 2018-10-15 DIAGNOSIS — M54.16 RADICULOPATHY OF LUMBAR REGION: Primary | ICD-10-CM

## 2018-10-15 DIAGNOSIS — R26.2 DIFFICULTY IN WALKING: ICD-10-CM

## 2018-10-15 DIAGNOSIS — R26.89 BALANCE DISORDER: ICD-10-CM

## 2018-10-15 PROCEDURE — 97110 THERAPEUTIC EXERCISES: CPT | Performed by: PHYSICAL MEDICINE & REHABILITATION

## 2018-10-15 PROCEDURE — 97140 MANUAL THERAPY 1/> REGIONS: CPT | Performed by: PHYSICAL MEDICINE & REHABILITATION

## 2018-10-15 PROCEDURE — G8991 OTHER PT/OT GOAL STATUS: HCPCS | Performed by: PHYSICAL MEDICINE & REHABILITATION

## 2018-10-15 PROCEDURE — G8990 OTHER PT/OT CURRENT STATUS: HCPCS | Performed by: PHYSICAL MEDICINE & REHABILITATION

## 2018-10-15 PROCEDURE — 97112 NEUROMUSCULAR REEDUCATION: CPT | Performed by: PHYSICAL MEDICINE & REHABILITATION

## 2018-10-15 NOTE — PROGRESS NOTES
Daily Note     Today's date: 10/15/2018  Patient name: Brigitte Shaw  : 1940  MRN: [id number]  Referring provider: Corrie Nolan DPM  Dx:   Encounter Diagnosis     ICD-10-CM    1  Radiculopathy of lumbar region M54 16    2  Difficulty in walking R26 2    3  Balance disorder R26 89                   Subjective: Rosalind reported "my back is feeling okay today, but my knee is bothering me "      Objective: See treatment diary below  Precautions: hypertension,     Daily Treatment Diary     Manual  10/5/2018 10/8/2018 10/12/2018 10/15/2018         [facility]  10min 10min 10min                                                                 Exercise Diary              Recumbent bike  5min 5min 5min         *Seated Flexion  3x10 3x10 3x15         *TrA contraction  3x12 3x12 3x15         TrA leg fall outs  3x15 3x15 3x15         *SLR - delia  2x10x2 3x10x2 3x12x2         Bridges   3x10 3x12         Squats    1/4 1x10                                                                                                                                                                     HEP Teaching and return Demonstration 10min                Modalities                                                         Assessment: Tolerated treatment well  Patient would benefit from continued PT  Rosalind needed verbal cueing for her exercises during today's session  She was able to perform one set of 1/4 squats which shows progress towards her goals  She did mention her most limiting factor is walking for long periods of time  Plan: Continue per plan of care  Start treadmill training to progress towards her goals

## 2018-10-15 NOTE — TELEPHONE ENCOUNTER
Please call patient to check if she needs refill of her levothyroxine  Also if she gets bumetanide from her specialist, she will have to contact their office

## 2018-10-16 NOTE — TELEPHONE ENCOUNTER
PATIENT STATES SHE NEEDS BOTH MEDICATIONS AND SHE DOES NOT SEE ANY SPECIALISTS FOR THE BUMEX; SO SHE DOES NOT THAT REFILLED AS WELL

## 2018-10-17 RX ORDER — BUMETANIDE 2 MG/1
TABLET ORAL
Qty: 90 TABLET | Refills: 1 | Status: SHIPPED | OUTPATIENT
Start: 2018-10-17 | End: 2018-10-29 | Stop reason: SDUPTHER

## 2018-10-17 RX ORDER — LEVOTHYROXINE SODIUM 0.1 MG/1
TABLET ORAL
Qty: 90 TABLET | Refills: 1 | Status: SHIPPED | OUTPATIENT
Start: 2018-10-17 | End: 2018-12-03

## 2018-10-18 ENCOUNTER — APPOINTMENT (OUTPATIENT)
Dept: PHYSICAL THERAPY | Facility: CLINIC | Age: 78
End: 2018-10-18
Payer: MEDICARE

## 2018-10-19 ENCOUNTER — OFFICE VISIT (OUTPATIENT)
Dept: PHYSICAL THERAPY | Facility: CLINIC | Age: 78
End: 2018-10-19
Payer: MEDICARE

## 2018-10-19 DIAGNOSIS — R26.2 DIFFICULTY IN WALKING: ICD-10-CM

## 2018-10-19 DIAGNOSIS — R26.89 BALANCE DISORDER: ICD-10-CM

## 2018-10-19 DIAGNOSIS — M54.16 RADICULOPATHY OF LUMBAR REGION: Primary | ICD-10-CM

## 2018-10-19 PROCEDURE — 97140 MANUAL THERAPY 1/> REGIONS: CPT | Performed by: PHYSICAL MEDICINE & REHABILITATION

## 2018-10-19 PROCEDURE — 97110 THERAPEUTIC EXERCISES: CPT | Performed by: PHYSICAL MEDICINE & REHABILITATION

## 2018-10-19 PROCEDURE — 97112 NEUROMUSCULAR REEDUCATION: CPT | Performed by: PHYSICAL MEDICINE & REHABILITATION

## 2018-10-19 NOTE — PROGRESS NOTES
Daily Note     Today's date: 10/19/2018  Patient name: Scott Simpson  : 1940  MRN: [id number]  Referring provider: Rodrigo Balderrama DPM  Dx:   Encounter Diagnosis     ICD-10-CM    1  Radiculopathy of lumbar region M54 16    2  Difficulty in walking R26 2    3  Balance disorder R26 89                   Subjective: Rosalind reported "I am feeling a little better today "      Objective: See treatment diary below  Precautions: hypertension,     Daily Treatment Diary     Manual  10/5/2018 10/8/2018 10/12/2018 10/15/2018 10/19/2018        [facility]  10min 10min 10min 10min                                                                Exercise Diary              Recumbent bike  5min 5min 5min treadmill 1/8th a mile 5 min        *Seated Flexion  3x10 3x10 3x15 3x15        *TrA contraction  3x12 3x12 3x15 3x15        TrA leg fall outs  3x15 3x15 3x15 3x15        *SLR - delia  2x10x2 3x10x2 3x12x2 3x12x2        Bridges   3x10 3x12 3x12        Squats    1/4 1x10 1/2 3x10        Heel raises     3x10                                                                                                                                                       HEP Teaching and return Demonstration 10min                Modalities                                                           Assessment: Tolerated treatment well  Patient would benefit from continued PT  Rosalind was able to walk for 5 minutes without pain during today's session which shows progress towards her goals  She also was able to increase her squatting depth and repetitions  Plan: Continue per plan of care

## 2018-10-22 ENCOUNTER — OFFICE VISIT (OUTPATIENT)
Dept: PHYSICAL THERAPY | Facility: CLINIC | Age: 78
End: 2018-10-22
Payer: MEDICARE

## 2018-10-22 DIAGNOSIS — R26.2 DIFFICULTY IN WALKING: ICD-10-CM

## 2018-10-22 DIAGNOSIS — M54.16 RADICULOPATHY OF LUMBAR REGION: Primary | ICD-10-CM

## 2018-10-22 DIAGNOSIS — R26.89 BALANCE DISORDER: ICD-10-CM

## 2018-10-22 PROCEDURE — 97112 NEUROMUSCULAR REEDUCATION: CPT | Performed by: PHYSICAL MEDICINE & REHABILITATION

## 2018-10-22 PROCEDURE — 97110 THERAPEUTIC EXERCISES: CPT | Performed by: PHYSICAL MEDICINE & REHABILITATION

## 2018-10-22 PROCEDURE — 97140 MANUAL THERAPY 1/> REGIONS: CPT | Performed by: PHYSICAL MEDICINE & REHABILITATION

## 2018-10-22 NOTE — PROGRESS NOTES
Daily Note     Today's date: 10/22/2018  Patient name: Edvin Rodriguez  : 1940  MRN: [id number]  Referring provider: Montana Allen DPM  Dx:   Encounter Diagnosis     ICD-10-CM    1  Radiculopathy of lumbar region M54 16    2  Difficulty in walking R26 2    3  Balance disorder R26 89                 Subjective: Rosalind reported "I am feeling a little better, my back isn't feeling too bad, but I get tired easily  I did have some pain after walking today "    Objective: See treatment diary below  Precautions: hypertension,     Daily Treatment Diary     Manual  10/5/2018 10/8/2018 10/12/2018 10/15/2018 10/19/2018 10/22/2018       [facility]  10min 10min 10min 10min 10min                                                               Exercise Diary              Recumbent bike  5min 5min 5min treadmill 1/8th a mile 5 min 5min       *Seated Flexion  3x10 3x10 3x15 3x15        *TrA contraction  3x12 3x12 3x15 3x15        TrA leg fall outs  3x15 3x15 3x15 3x15        *SLR - delia  2x10x2 3x10x2 3x12x2 3x12x2        Bridges   3x10 3x12 3x12        Squats    1/4 1x10 1/2 3x10        Heel raises     3x10        treadmill      1MPH - 6min                                                                                                                                         HEP Teaching and return Demonstration 10min                Modalities                                                         Assessment: Tolerated treatment well  Patient would benefit from continued PT  Razia was able to increase her ambulation duration to 6 min which shows progress towards her goals  She continues to need verbal cueing for proper exercise form  Plan: Continue per plan of care  Continue to progress treadmill walking as tolerated  Review proper bed mobility - supine to sit  Continue gastroc stretching - standing due to UE fatigue  *dennotes HEP

## 2018-10-26 ENCOUNTER — OFFICE VISIT (OUTPATIENT)
Dept: FAMILY MEDICINE CLINIC | Facility: CLINIC | Age: 78
End: 2018-10-26
Payer: MEDICARE

## 2018-10-26 ENCOUNTER — TELEPHONE (OUTPATIENT)
Dept: NEPHROLOGY | Facility: CLINIC | Age: 78
End: 2018-10-26

## 2018-10-26 ENCOUNTER — OFFICE VISIT (OUTPATIENT)
Dept: PHYSICAL THERAPY | Facility: CLINIC | Age: 78
End: 2018-10-26
Payer: MEDICARE

## 2018-10-26 ENCOUNTER — LAB (OUTPATIENT)
Dept: LAB | Facility: CLINIC | Age: 78
End: 2018-10-26
Payer: MEDICARE

## 2018-10-26 VITALS
SYSTOLIC BLOOD PRESSURE: 112 MMHG | BODY MASS INDEX: 41.35 KG/M2 | OXYGEN SATURATION: 98 % | DIASTOLIC BLOOD PRESSURE: 60 MMHG | HEIGHT: 61 IN | WEIGHT: 219 LBS | RESPIRATION RATE: 18 BRPM | HEART RATE: 68 BPM

## 2018-10-26 DIAGNOSIS — R26.2 DIFFICULTY IN WALKING: Primary | ICD-10-CM

## 2018-10-26 DIAGNOSIS — M54.16 RADICULOPATHY OF LUMBAR REGION: ICD-10-CM

## 2018-10-26 DIAGNOSIS — R26.89 BALANCE DISORDER: ICD-10-CM

## 2018-10-26 DIAGNOSIS — N18.30 CKD (CHRONIC KIDNEY DISEASE), STAGE III (HCC): ICD-10-CM

## 2018-10-26 DIAGNOSIS — R06.02 SOB (SHORTNESS OF BREATH): ICD-10-CM

## 2018-10-26 DIAGNOSIS — R26.2 DIFFICULTY IN WALKING: ICD-10-CM

## 2018-10-26 DIAGNOSIS — I10 BENIGN ESSENTIAL HTN: ICD-10-CM

## 2018-10-26 DIAGNOSIS — I50.41 ACUTE COMBINED SYSTOLIC AND DIASTOLIC HEART FAILURE (HCC): ICD-10-CM

## 2018-10-26 DIAGNOSIS — R06.00 DYSPNEA ON EXERTION: Primary | ICD-10-CM

## 2018-10-26 PROBLEM — R06.09 DOE (DYSPNEA ON EXERTION): Status: ACTIVE | Noted: 2018-10-26

## 2018-10-26 LAB
25(OH)D3 SERPL-MCNC: 48.4 NG/ML (ref 30–100)
ALBUMIN SERPL BCP-MCNC: 3.5 G/DL (ref 3.5–5)
ALP SERPL-CCNC: 132 U/L (ref 46–116)
ALT SERPL W P-5'-P-CCNC: 21 U/L (ref 12–78)
ANION GAP SERPL CALCULATED.3IONS-SCNC: 7 MMOL/L (ref 4–13)
AST SERPL W P-5'-P-CCNC: 16 U/L (ref 5–45)
BACTERIA UR QL AUTO: ABNORMAL /HPF
BASOPHILS # BLD AUTO: 0.03 THOUSANDS/ΜL (ref 0–0.1)
BASOPHILS NFR BLD AUTO: 0 % (ref 0–1)
BILIRUB SERPL-MCNC: 0.34 MG/DL (ref 0.2–1)
BILIRUB UR QL STRIP: NEGATIVE
BUN SERPL-MCNC: 46 MG/DL (ref 5–25)
CALCIUM SERPL-MCNC: 9.3 MG/DL (ref 8.3–10.1)
CHLORIDE SERPL-SCNC: 103 MMOL/L (ref 100–108)
CHOLEST SERPL-MCNC: 203 MG/DL (ref 50–200)
CLARITY UR: ABNORMAL
CO2 SERPL-SCNC: 28 MMOL/L (ref 21–32)
COLOR UR: YELLOW
CREAT SERPL-MCNC: 1.66 MG/DL (ref 0.6–1.3)
CREAT UR-MCNC: 98.3 MG/DL
EOSINOPHIL # BLD AUTO: 0.24 THOUSAND/ΜL (ref 0–0.61)
EOSINOPHIL NFR BLD AUTO: 4 % (ref 0–6)
ERYTHROCYTE [DISTWIDTH] IN BLOOD BY AUTOMATED COUNT: 15.9 % (ref 11.6–15.1)
GFR SERPL CREATININE-BSD FRML MDRD: 29 ML/MIN/1.73SQ M
GLUCOSE P FAST SERPL-MCNC: 104 MG/DL (ref 65–99)
GLUCOSE UR STRIP-MCNC: NEGATIVE MG/DL
HCT VFR BLD AUTO: 36.1 % (ref 34.8–46.1)
HDLC SERPL-MCNC: 45 MG/DL (ref 40–60)
HGB BLD-MCNC: 11.4 G/DL (ref 11.5–15.4)
HGB UR QL STRIP.AUTO: NEGATIVE
HYALINE CASTS #/AREA URNS LPF: ABNORMAL /LPF
IMM GRANULOCYTES # BLD AUTO: 0.03 THOUSAND/UL (ref 0–0.2)
IMM GRANULOCYTES NFR BLD AUTO: 0 % (ref 0–2)
KETONES UR STRIP-MCNC: NEGATIVE MG/DL
LDLC SERPL CALC-MCNC: 126 MG/DL (ref 0–100)
LEUKOCYTE ESTERASE UR QL STRIP: ABNORMAL
LYMPHOCYTES # BLD AUTO: 0.75 THOUSANDS/ΜL (ref 0.6–4.47)
LYMPHOCYTES NFR BLD AUTO: 11 % (ref 14–44)
MCH RBC QN AUTO: 29.2 PG (ref 26.8–34.3)
MCHC RBC AUTO-ENTMCNC: 31.6 G/DL (ref 31.4–37.4)
MCV RBC AUTO: 92 FL (ref 82–98)
MONOCYTES # BLD AUTO: 0.6 THOUSAND/ΜL (ref 0.17–1.22)
MONOCYTES NFR BLD AUTO: 9 % (ref 4–12)
NEUTROPHILS # BLD AUTO: 5.09 THOUSANDS/ΜL (ref 1.85–7.62)
NEUTS SEG NFR BLD AUTO: 76 % (ref 43–75)
NITRITE UR QL STRIP: NEGATIVE
NON-SQ EPI CELLS URNS QL MICRO: ABNORMAL /HPF
NONHDLC SERPL-MCNC: 158 MG/DL
NRBC BLD AUTO-RTO: 0 /100 WBCS
PH UR STRIP.AUTO: 7 [PH] (ref 4.5–8)
PHOSPHATE SERPL-MCNC: 3.7 MG/DL (ref 2.3–4.1)
PLATELET # BLD AUTO: 175 THOUSANDS/UL (ref 149–390)
PMV BLD AUTO: 12.3 FL (ref 8.9–12.7)
POTASSIUM SERPL-SCNC: 4.6 MMOL/L (ref 3.5–5.3)
PROT SERPL-MCNC: 7.4 G/DL (ref 6.4–8.2)
PROT UR STRIP-MCNC: NEGATIVE MG/DL
PROT UR-MCNC: 17 MG/DL
PROT/CREAT UR: 0.17 MG/G{CREAT} (ref 0–0.1)
PTH-INTACT SERPL-MCNC: 129.4 PG/ML (ref 18.4–80.1)
RBC # BLD AUTO: 3.91 MILLION/UL (ref 3.81–5.12)
RBC #/AREA URNS AUTO: ABNORMAL /HPF
SODIUM SERPL-SCNC: 138 MMOL/L (ref 136–145)
SP GR UR STRIP.AUTO: 1.02 (ref 1–1.03)
T4 FREE SERPL-MCNC: 1.3 NG/DL (ref 0.76–1.46)
TRIGL SERPL-MCNC: 159 MG/DL
TSH SERPL DL<=0.05 MIU/L-ACNC: 5.23 UIU/ML (ref 0.36–3.74)
UROBILINOGEN UR QL STRIP.AUTO: 0.2 E.U./DL
WBC # BLD AUTO: 6.74 THOUSAND/UL (ref 4.31–10.16)
WBC #/AREA URNS AUTO: ABNORMAL /HPF

## 2018-10-26 PROCEDURE — 82306 VITAMIN D 25 HYDROXY: CPT

## 2018-10-26 PROCEDURE — 36415 COLL VENOUS BLD VENIPUNCTURE: CPT | Performed by: FAMILY MEDICINE

## 2018-10-26 PROCEDURE — 84100 ASSAY OF PHOSPHORUS: CPT

## 2018-10-26 PROCEDURE — 80061 LIPID PANEL: CPT | Performed by: FAMILY MEDICINE

## 2018-10-26 PROCEDURE — 93000 ELECTROCARDIOGRAM COMPLETE: CPT | Performed by: FAMILY MEDICINE

## 2018-10-26 PROCEDURE — 99215 OFFICE O/P EST HI 40 MIN: CPT | Performed by: FAMILY MEDICINE

## 2018-10-26 PROCEDURE — 84439 ASSAY OF FREE THYROXINE: CPT | Performed by: FAMILY MEDICINE

## 2018-10-26 PROCEDURE — 80053 COMPREHEN METABOLIC PANEL: CPT | Performed by: FAMILY MEDICINE

## 2018-10-26 PROCEDURE — 97110 THERAPEUTIC EXERCISES: CPT

## 2018-10-26 PROCEDURE — 84443 ASSAY THYROID STIM HORMONE: CPT | Performed by: FAMILY MEDICINE

## 2018-10-26 PROCEDURE — 82570 ASSAY OF URINE CREATININE: CPT

## 2018-10-26 PROCEDURE — 81001 URINALYSIS AUTO W/SCOPE: CPT

## 2018-10-26 PROCEDURE — 84156 ASSAY OF PROTEIN URINE: CPT

## 2018-10-26 PROCEDURE — 83970 ASSAY OF PARATHORMONE: CPT

## 2018-10-26 PROCEDURE — 85025 COMPLETE CBC W/AUTO DIFF WBC: CPT | Performed by: FAMILY MEDICINE

## 2018-10-26 NOTE — ASSESSMENT & PLAN NOTE
advised the patient to hold off on any further physical therapy until she is evaluated by cardiologist and cleared to return back to physical therapy

## 2018-10-26 NOTE — PROGRESS NOTES
Daily Note     Today's date: 10/26/2018  Patient name: Dami Maldonado  : 1940  MRN: [id number]  Referring provider: Ozzy Martinez DPM  Dx:   Encounter Diagnosis     ICD-10-CM    1  Difficulty in walking R26 2    2  Radiculopathy of lumbar region M54 16    3  Balance disorder R26 89         1:45 -2:20 1:1 CF       Subjective: Pt states she is not having a good day as she has increased discomfort in both of her legs and in her low back  Pt state she just feels tired today  Pt states her back has been feeling a little better since last therapy session  Objective: See treatment diary below  Precautions: hypertension,     Daily Treatment Diary     Manual  10/5/2018 10/8/2018 10/12/2018 10/15/2018 10/19/2018 10/26/2018 10/26      [facility]  10min 10min 10min 10min 10min                                                               Exercise Diary        10/26      Recumbent bike  5min 5min 5min treadmill  a mile 5 min 5min 5 mins       *Seated Flexion  3x10 3x10 3x15 3x15  2 x15      *TrA contraction  3x12 3x12 3x15 3x15  5" x 20       TrA leg fall outs  3x15 3x15 3x15 3x15  2 x15   YTB      *SLR - delia  2x10x2 3x10x2 3x12x2 3x12x2  1 x 15 x 2"      Bridges   3x10 3x12 3x12  2 x15      Squats    1/4 1x10 1/2 3x10           Heel raises     3x10        treadmill      1MPH - 6min 1MPH  4'                                                                                                                                        HEP Teaching and return Demonstration 10min                Modalities                                                         Assessment: Pt progressed through exercises fair as she was tired today and required more rest breaks throughout session  Pt was having difficulty with breathing, more than normal, vitals were taken HR: 59 bpm, BP: 140/76 mmHg , supervising PT assessed pt and advised her to go to her doctors office next door   Pt was walked over and clinical staff was informed and pt was left in their care  Plan: Continue per plan of care

## 2018-10-26 NOTE — ASSESSMENT & PLAN NOTE
At this time the patient seems to be slightly volume overloaded  I would suspect that this is due to adjustments in her diuretic regimen  She was instructed to take the other half of her Bumex tablet today when she gets home  Patient was advised that if she does develop any chest pain, worsening shortness of breath then she will need to proceed to the nearest ER however I would like to refrain from doing this as her family is coming up tonight as it is her 78th birthday    She is not having any overt chest pain

## 2018-10-26 NOTE — ASSESSMENT & PLAN NOTE
Likely due to mild exacerbation of congestive heart failure  Normal pulse oximetry  Patient is to take her other half of Bumex [employer]    Message sent to cardiologist requesting further follow-up

## 2018-10-26 NOTE — PROGRESS NOTES
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) ([address] )      At this time the patient seems to be slightly volume overloaded  I would suspect that this is due to adjustments in her diuretic regimen  She was instructed to take the other half of her Bumex tablet today when she gets home  Patient was advised that if she does develop any chest pain, worsening shortness of breath then she will need to proceed to the nearest ER however I would like to refrain from doing this as her family is coming up tonight as it is her 78th birthday  She is not having any overt chest pain         CKD (chronic kidney disease), stage III ([address] )      Needs to follow up with Nephrology  Patient have the adjustments of her  Slightly volume overloaded she will again need additional adjustment of her diuretic regimen  Difficulty in walking      advised the patient to hold off on any further physical therapy until she is evaluated by cardiologist and cleared to return back to physical therapy         SOB (shortness of breath) - Primary      Likely due to mild exacerbation of congestive heart failure  Normal pulse oximetry  Patient is to take her other half of Bumex tonight  Message sent to cardiologist requesting further follow-up         Relevant Orders    POCT ECG (Completed)          Discussed case with Dr Araujo:    Patient ID: July Enriquez is a 77 y o  female  Pt is presenting today for  65-year-old female with past medical history of congestive heart failure, chronic kidney disease presents for evaluation regarding shortness of breath and lower extremity swelling  Patient was at physical therapy today when she was noted to be dyspneic on exertion and tachycardic  Patient denies any chest pain    She states that recently nephrologist has made adjustments to her dose of Bumex so that she is alternating between taking 2 mg and 1 mg  Today is the day when she took 1 mg  Her weight has been trending upwards  She has not seen her cardiologist since March  Patient does not weigh herself at home  Shortness of Breath   Associated symptoms include leg swelling  Pertinent negatives include no chest pain or wheezing  The symptoms are aggravated by exercise  The following portions of the patient's history were reviewed and updated as appropriate: allergies, current medications, past family history, past medical history, past social history, past surgical history and problem list     Review of Systems   Constitutional: Positive for unexpected weight change (  Recent weight gain)  Negative for activity change, appetite change, diaphoresis and fatigue  HENT: Negative  Respiratory: Positive for shortness of breath  Negative for cough, chest tightness and wheezing  Cardiovascular: Positive for leg swelling  Negative for chest pain  Musculoskeletal: Positive for back pain  Objective:  /60   Pulse 68   Resp 18   Ht 5' 1" (1 549 m)   Wt 99 3 kg (219 lb)   SpO2 98%   BMI 41 38 kg/m²      Physical Exam   Constitutional: She appears well-developed and well-nourished  No distress  Neck: Normal range of motion  Neck supple  No thyromegaly present  No JVD Or HJR   Cardiovascular: Normal rate, regular rhythm, normal heart sounds and intact distal pulses  No murmur heard  Pulmonary/Chest: Effort normal and breath sounds normal  She has no wheezes  She has no rales  Abdominal: Soft  Bowel sounds are normal    Musculoskeletal: She exhibits edema ( 3+ pitting edema bilateral lower extremities)  Lymphadenopathy:     She has no cervical adenopathy  Skin: She is not diaphoretic  Psychiatric: She has a normal mood and affect   Her behavior is normal  Judgment and thought content normal

## 2018-10-26 NOTE — TELEPHONE ENCOUNTER
Called the patient to review abnormal blood work  Noted that the primary care teams note, that the patient was short of breath  Spoke to the patient regarding this  Patient is declining to go to the ER as requested by the primary care physician  Patient states that she has gained 6 pounds    Having dyspnea on exertion  -I advised the patient to go to the ER also but she declined  -therefore we have increased Bumex to 1 milligram twice a day from 1 milligram once a day  -I advised the patient that if this does not help the symptoms, she should increased to 2 milligrams in the morning and 1 milligram in the evening  - if symptoms worsen, should go to ER - pt agrees  - we will get patient in to see us unless she is seen by Cardiology on Monday  - will repeat BMP next week

## 2018-10-26 NOTE — ASSESSMENT & PLAN NOTE
Needs to follow up with Nephrology  Patient have the adjustments of her  Slightly volume overloaded she will again need additional adjustment of her diuretic regimen

## 2018-10-29 ENCOUNTER — OFFICE VISIT (OUTPATIENT)
Dept: NEPHROLOGY | Facility: CLINIC | Age: 78
End: 2018-10-29
Payer: MEDICARE

## 2018-10-29 ENCOUNTER — APPOINTMENT (OUTPATIENT)
Dept: PHYSICAL THERAPY | Facility: CLINIC | Age: 78
End: 2018-10-29
Payer: MEDICARE

## 2018-10-29 VITALS
BODY MASS INDEX: 41.16 KG/M2 | WEIGHT: 218 LBS | SYSTOLIC BLOOD PRESSURE: 122 MMHG | DIASTOLIC BLOOD PRESSURE: 60 MMHG | HEIGHT: 61 IN

## 2018-10-29 DIAGNOSIS — I10 ESSENTIAL HYPERTENSION: ICD-10-CM

## 2018-10-29 DIAGNOSIS — N17.9 AKI (ACUTE KIDNEY INJURY) (HCC): Primary | ICD-10-CM

## 2018-10-29 PROCEDURE — 99214 OFFICE O/P EST MOD 30 MIN: CPT | Performed by: INTERNAL MEDICINE

## 2018-10-29 RX ORDER — BUMETANIDE 2 MG/1
1 TABLET ORAL 2 TIMES DAILY
Qty: 90 TABLET | Refills: 0
Start: 2018-10-29 | End: 2019-04-17 | Stop reason: SDUPTHER

## 2018-10-29 NOTE — LETTER
October 29, 2018     Alfreda Lane MD  [address]  [place] [facility] [address]    Patient: Ayla Bender   YOB: 1940   Date of Visit: 10/29/2018       Dear Dr Flavio Nava:    Thank you for referring Ghada Larson to me for evaluation  Below are my notes for this consultation  If you have questions, please do not hesitate to call me  I look forward to following your patient along with you  Sincerely,        Kimber Manzanares MD        CC: Savana Molina, MD Kimber Gunter MD  10/29/2018 12:21 PM  Sign at close encounter  [address] [facility] 77 y o  female MRN: [id number]  10/29/2018    Reason for Visit: sick visit for SOB    ASSESSMENT and PLAN:    I had the pleasure of seeing Ms Schuyler Nava today in the renal clinic for the continued management of CKD III, and SOB  Patient began to have shortness of breath on exertion approximately 2-3 months ago  Became worse last week  Patient states she has gained approximately 6 lb  She was at rehab and was sent to primary care physician who increase the patient's Bumetanide advised the patient to go to the ER  Patient declined going to the ER at that time  Patient states that since increasing the Bumetanide, the weight has improved and is now 216 lb and shortness of breath has improved  Edema is also improving  13-year-old female with a past medical history of chronic kidney disease, venous stasis, HTN, hypothyroidism, lumbar canal stenosis, Anxiety, GERD, neuropathy, Gout, EF 43-01%, Grade 2 diastolic dysfunction who presents for follow up for CKD and shortness of breath  To note, patient's  has now passed away in August      1) CKD - Prior year had nl Cr prior to aug 2016 and then had SHABANA during diarrhea episode  To note, patient also has a long standing history of HTN  Creatinine in 2013, 0 9 mg/dL   Cr early 2016 was 0 80-0 9 mg/dL; then increased starting in august 2016 to 2 3 mg/dL and has fluctuated since  CT scan in 2016, kidneys appearing normal at that time  UA in august was bland       Etiology of CKD may be long standing HTN and ATN  Baseline Cr ~ 1 1 -1 3 mg/dL, but most recent last week was elevated to 1 7 mg/dL     - Urine eos 0%;   - A1c 5 6% in nov  - UA 5-10 WBC  Patient asymptomatic   -U PCR stable  - C4 nl 38; C3 nl 142  - Renal u/s with R 10 3 cm, L 10 4 cm, renal cortex 1 cm b/l; both kidneys slightly reduced in size; lower pole of R kidney is non obstructing calculus  - Pt follows with Urology team for nephrolithiasis  - No NSAIDs, the patient is not currently taking NSAID  - continue Bumex 1 mg twice a day  -patient given parameters to increase Bumex if weight rises  -was going to complete an echocardiogram, but the patient prefers that this is discussed with the cardiologist 1st   I did send a message to the cardiology team and will defer to their discretion regarding the plan for dyspnea on exertion  With echo versus stress versus other  On exam, no new murmurs  -BMP next week     2) SOB - Volume - follows with Cardiology    -see above  - etiology of shortness of breath appears volume mediated  Lungs are clear today on exam   No rales auscultated  Has edema lower extremities  Patient states that since increasing Bumex edema is improved  Patient may need further cardiac workup and we will defer this to the Cardiology team   Does not appear infectious related  No sig proteinuria  LFTs stable      3) HTN -      - continue amlodipine/olmesartan; bumex, spironolactone, Propranolol  -may need to hold amlodipine if blood pressures are less than 492 systolic     4) hypothyroid - as per Primary Care Physician     5) HPL - Primary Care following       6) Electrolytes - stable     7) MBD -Vit D 48 appropriate   Phos 3 7 -- appropriate       - PTH rising 129 - monitor for now    8) gout -      - on allopurinol - may need to reduce dose if Cr rises  -patient only takes colchicine up to 3 doses max when has a gout flare     9) back pain - follows with Pain management     10) Colonoscopy in feb with internal hemorrhoids and polyp removed       11) alk phos elevation     - stable, but still elevated  - LFTs nl in may  - was started on allopurinol since last check when was normal  Could be related to allopurinol     12) Anemia - Hb stable     It was a pleasure evaluating your patient in the office today  Thank you for allowing our team to participate in the care of Ms Silvana Archibald  Please do not hesitate to contact our team if further issues/questions shall arise in the interim  No problem-specific Assessment & Plan notes found for this encounter  HPI:    Patient weight is now 216 lbs and was 218 last week  Recently did not change diet, did not eat out a lot  SOB for last couple months  Shortness of breath worsening last week  PATIENT INSTRUCTIONS:    Patient Instructions   1) stay on the bumetanide at 1/2 tablet twice daily  2) if your weight goes up any more than 216 pounds OR does not continue to improve, then take 1 tablet bumetanide in AM and 1/2 tablet at night until weight improves  3) limit your fluid intake to less than 6 glasses of fluid daily  4) do your labwork next week  5) if you do not from Cardiology office by Thursday, please call me    OBJECTIVE:  Current Weight: Weight - Scale: 98 9 kg (218 lb)  Vitals:    10/29/18 1123 10/29/18 1213   BP:  122/60   Weight: 98 9 kg (218 lb)    Height: 5' 1" (1 549 m)     Body mass index is 41 19 kg/m²  REVIEW OF SYSTEMS:    Review of Systems   Constitutional: Negative  Negative for fatigue  HENT: Negative  Eyes: Negative  Respiratory: Positive for shortness of breath  Cardiovascular: Positive for leg swelling  Gastrointestinal: Negative  Endocrine: Negative  Genitourinary: Negative  Negative for difficulty urinating  Musculoskeletal: Negative  Skin: Negative  Allergic/Immunologic: Negative  Neurological: Negative  Hematological: Negative  Psychiatric/Behavioral: Negative  All other systems reviewed and are negative  PHYSICAL EXAM:      Physical Exam   Constitutional: She is oriented to person, place, and time  She appears well-developed and well-nourished  No distress  HENT:   Head: Normocephalic and atraumatic  Mouth/Throat: No oropharyngeal exudate  Eyes: Conjunctivae are normal  Right eye exhibits no discharge  Left eye exhibits no discharge  No scleral icterus  Neck: Normal range of motion  Neck supple  No JVD present  Cardiovascular: Normal rate and regular rhythm  Exam reveals no gallop and no friction rub  No murmur heard  No JVP elevation   Pulmonary/Chest: Effort normal and breath sounds normal  No respiratory distress  She has no wheezes  She has no rales  Abdominal: Soft  Bowel sounds are normal  She exhibits no distension  There is no tenderness  There is no rebound  Musculoskeletal: Normal range of motion  She exhibits edema  She exhibits no tenderness or deformity  2+ LE edema  Neurological: She is alert and oriented to person, place, and time  Coordination normal    Skin: Skin is warm and dry  No rash noted  She is not diaphoretic  No erythema  No pallor  Psychiatric: She has a normal mood and affect  Her behavior is normal  Judgment and thought content normal    Nursing note and vitals reviewed        Medications:    Current Outpatient Prescriptions:     allopurinol (ZYLOPRIM) 300 mg tablet, Take 1 tablet (300 mg total) by mouth every morning, Disp: 90 tablet, Rfl: 1    amlodipine-olmesartan (BLACK) 10-40 MG, TAKE 1 TABLET ONE TIME DAILY (Patient taking differently: pt takes 5-20mg ,1 tab daily ), Disp: 90 tablet, Rfl: 3    aspirin 81 MG tablet, Take 81 mg by mouth every morning  , Disp: , Rfl:     bumetanide (BUMEX) 2 mg tablet, Take 0 5 tablets (1 mg total) by mouth 2 (two) times a day, Disp: 90 tablet, Rfl: 0    cholecalciferol (VITAMIN D3) 1,000 units tablet, Take 1 tablet by mouth daily, Disp: , Rfl:     colchicine (COLCRYS) 0 6 mg tablet, Take 0 6 mg by mouth daily as needed Only in gout flare   , Disp: , Rfl:     diclofenac sodium (VOLTAREN) 1 %, Apply 2 g topically 4 (four) times a day, Disp: 100 g, Rfl: 0    gabapentin (NEURONTIN) 300 mg capsule, Take 1 capsule (300 mg total) by mouth daily at bedtime for 90 days, Disp: 90 capsule, Rfl: 0    levothyroxine 100 mcg tablet, TAKE 1 TABLET EVERY DAY, Disp: 90 tablet, Rfl: 1    propranolol (INDERAL) 80 mg tablet, TAKE 1/2 TABLET EVERY 12 HOURS DAILY, Disp: 90 tablet, Rfl: 3    spironolactone (ALDACTONE) 25 mg tablet, Take 1 tablet (25 mg total) by mouth daily, Disp: 90 tablet, Rfl: 1    Omega-3-acid Ethyl Esters & D3 1 & 1000 GM & UNIT KIT, Take 2 capsules by mouth 2 (two) times a day, Disp: , Rfl:     omeprazole (PriLOSEC) 20 mg delayed release capsule, Take 1 capsule (20 mg total) by mouth every morning, Disp: 90 capsule, Rfl: 1    predniSONE 10 mg tablet, Take 1 tablet (10 mg total) by mouth daily, Disp: 5 tablet, Rfl: 0    Laboratory Results:    Results from last 7 days  Lab Units 10/26/18  0837   WBC Thousand/uL 6 74   HEMOGLOBIN g/dL 11 4*   HEMATOCRIT % 36 1   PLATELETS Thousands/uL 175   SODIUM mmol/L 138   POTASSIUM mmol/L 4 6   CHLORIDE mmol/L 103   CO2 mmol/L 28   BUN mg/dL 46*   CREATININE mg/dL 1 66*   CALCIUM mg/dL 9 3   PHOSPHORUS mg/dL 3 7       Results for orders placed or performed in visit on 10/26/18   Phosphorus   Result Value Ref Range    Phosphorus 3 7 2 3 - 4 1 mg/dL   Protein / creatinine ratio, urine   Result Value Ref Range    Creatinine, Ur 98 3 mg/dL    Protein Urine Random 17 mg/dL    Prot/Creat Ratio, Ur 0 17 (H) 0 00 - 0 10   PTH, intact   Result Value Ref Range     4 (H) 18 4 - 80 1 pg/mL   Urinalysis with microscopic   Result Value Ref Range    Clarity, UA Cloudy     Color, UA Yellow     Specific [place], UA 1 019 1 003 - 1 030    pH, UA 7 0 4 5 - 8 0    Glucose, UA Negative Negative mg/dl    Ketones, UA Negative Negative mg/dl    Blood, UA Negative Negative    Protein, UA Negative Negative mg/dl    Nitrite, UA Negative Negative    Bilirubin, UA Negative Negative    Urobilinogen, UA 0 2 0 2, 1 0 E U /dl E U /dl    Leukocytes, UA Moderate (A) Negative    WBC, UA 10-20 (A) None Seen, 0-5, 5-55, 5-65 /hpf    RBC, UA None Seen None Seen, 0-5 /hpf    Hyaline Casts, UA None Seen None Seen /lpf    Bacteria, UA Occasional None Seen, Occasional /hpf    Epithelial Cells Moderate (A) None Seen, Occasional /hpf   Vitamin D 25 hydroxy   Result Value Ref Range    Vit D, 25-Hydroxy 48 4 30 0 - 100 0 ng/mL

## 2018-10-29 NOTE — PATIENT INSTRUCTIONS
1) stay on the bumetanide at 1/2 tablet twice daily  2) if your weight goes up any more than 216 pounds OR does not continue to improve, then take 1 tablet bumetanide in AM and 1/2 tablet at night until weight improves  3) limit your fluid intake to less than 6 glasses of fluid daily  4) do your labwork next week  5) if you do not from Cardiology office by Thursday, please call me

## 2018-10-29 NOTE — PROGRESS NOTES
NEPHROLOGY OFFICE VISIT   David Reyna 77 y o  female MRN: [id number]  10/29/2018    Reason for Visit: sick visit for SOB    ASSESSMENT and PLAN:    I had the pleasure of seeing Ms Wolfgang Ayon today in the renal clinic for the continued management of CKD III, and SOB  Patient began to have shortness of breath on exertion approximately 2-3 months ago  Became worse last week  Patient states she has gained approximately 6 lb  She was at rehab and was sent to primary care physician who increase the patient's Bumetanide advised the patient to go to the ER  Patient declined going to the ER at that time  Patient states that since increasing the Bumetanide, the weight has improved and is now 216 lb and shortness of breath has improved  Edema is also improving  71-year-old female with a past medical history of chronic kidney disease, venous stasis, HTN, hypothyroidism, lumbar canal stenosis, Anxiety, GERD, neuropathy, Gout, EF 01-95%, Grade 2 diastolic dysfunction who presents for follow up for CKD and shortness of breath  To note, patient's  has now passed away in August      1) CKD - Prior year had nl Cr prior to aug 2016 and then had SHABANA during diarrhea episode  To note, patient also has a long standing history of HTN  Creatinine in 2013, 0 9 mg/dL  Cr early 2016 was 0 80-0 9 mg/dL; then increased starting in august 2016 to 2 3 mg/dL and has fluctuated since  CT scan in 2016, kidneys appearing normal at that time  UA in august was bland       Etiology of CKD may be long standing HTN and ATN  Baseline Cr ~ 1 1 -1 3 mg/dL, but most recent last week was elevated to 1 7 mg/dL     - Urine eos 0%;   - A1c 5 6% in nov  - UA 5-10 WBC  Patient asymptomatic   -U PCR stable  - C4 nl 38; C3 nl 142  - Renal u/s with R 10 3 cm, L 10 4 cm, renal cortex 1 cm b/l; both kidneys slightly reduced in size; lower pole of R kidney is non obstructing calculus  - Pt follows with Urology team for nephrolithiasis     - No NSAIDs, the patient is not currently taking NSAID  - continue Bumex 1 mg twice a day  -patient given parameters to increase Bumex if weight rises  -was going to complete an echocardiogram, but the patient prefers that this is discussed with the cardiologist 1st   I did send a message to the cardiology team and will defer to their discretion regarding the plan for dyspnea on exertion  With echo versus stress versus other  On exam, no new murmurs  -BMP next week     2) SOB - Volume - follows with Cardiology    -see above  - etiology of shortness of breath appears volume mediated  Lungs are clear today on exam   No rales auscultated  Has edema lower extremities  Patient states that since increasing Bumex edema is improved  Patient may need further cardiac workup and we will defer this to the Cardiology team   Does not appear infectious related  No sig proteinuria  LFTs stable      3) HTN -      - continue amlodipine/olmesartan; bumex, spironolactone, Propranolol  -may need to hold amlodipine if blood pressures are less than 989 systolic     4) hypothyroid - as per Primary Care Physician     5) HPL - Primary Care following       6) Electrolytes - stable     7) MBD -Vit D 48 appropriate  Phos 3 7 -- appropriate       - PTH rising 129 - monitor for now    8) gout -      - on allopurinol - may need to reduce dose if Cr rises  -patient only takes colchicine up to 3 doses max when has a gout flare     9) back pain - follows with Pain management     10) Colonoscopy in feb with internal hemorrhoids and polyp removed       11) alk phos elevation     - stable, but still elevated  - LFTs nl in may  - was started on allopurinol since last check when was normal  Could be related to allopurinol     12) Anemia - Hb stable     It was a pleasure evaluating your patient in the office today  Thank you for allowing our team to participate in the care of Ms Dung Gallardo   Please do not hesitate to contact our team if further issues/questions shall arise in the interim  No problem-specific Assessment & Plan notes found for this encounter  HPI:    Patient weight is now 216 lbs and was 218 last week  Recently did not change diet, did not eat out a lot  SOB for last couple months  Shortness of breath worsening last week  PATIENT INSTRUCTIONS:    Patient Instructions   1) stay on the bumetanide at 1/2 tablet twice daily  2) if your weight goes up any more than 216 pounds OR does not continue to improve, then take 1 tablet bumetanide in AM and 1/2 tablet at night until weight improves  3) limit your fluid intake to less than 6 glasses of fluid daily  4) do your labwork next week  5) if you do not from Cardiology office by Thursday, please call me    OBJECTIVE:  Current Weight: Weight - Scale: 98 9 kg (218 lb)  Vitals:    10/29/18 1123 10/29/18 1213   BP:  122/60   Weight: 98 9 kg (218 lb)    Height: 5' 1" (1 549 m)     Body mass index is 41 19 kg/m²  REVIEW OF SYSTEMS:    Review of Systems   Constitutional: Negative  Negative for fatigue  HENT: Negative  Eyes: Negative  Respiratory: Positive for shortness of breath  Cardiovascular: Positive for leg swelling  Gastrointestinal: Negative  Endocrine: Negative  Genitourinary: Negative  Negative for difficulty urinating  Musculoskeletal: Negative  Skin: Negative  Allergic/Immunologic: Negative  Neurological: Negative  Hematological: Negative  Psychiatric/Behavioral: Negative  All other systems reviewed and are negative  PHYSICAL EXAM:      Physical Exam   Constitutional: She is oriented to person, place, and time  She appears well-developed and well-nourished  No distress  HENT:   Head: Normocephalic and atraumatic  Mouth/Throat: No oropharyngeal exudate  Eyes: Conjunctivae are normal  Right eye exhibits no discharge  Left eye exhibits no discharge  No scleral icterus  Neck: Normal range of motion  Neck supple  No JVD present  Cardiovascular: Normal rate and regular rhythm  Exam reveals no gallop and no friction rub  No murmur heard  No JVP elevation   Pulmonary/Chest: Effort normal and breath sounds normal  No respiratory distress  She has no wheezes  She has no rales  Abdominal: Soft  Bowel sounds are normal  She exhibits no distension  There is no tenderness  There is no rebound  Musculoskeletal: Normal range of motion  She exhibits edema  She exhibits no tenderness or deformity  2+ LE edema  Neurological: She is alert and oriented to person, place, and time  Coordination normal    Skin: Skin is warm and dry  No rash noted  She is not diaphoretic  No erythema  No pallor  Psychiatric: She has a normal mood and affect  Her behavior is normal  Judgment and thought content normal    Nursing note and vitals reviewed        Medications:    Current Outpatient Prescriptions:     allopurinol (ZYLOPRIM) 300 mg tablet, Take 1 tablet (300 mg total) by mouth every morning, Disp: 90 tablet, Rfl: 1    amlodipine-olmesartan (BLACK) 10-40 MG, TAKE 1 TABLET ONE TIME DAILY (Patient taking differently: pt takes 5-20mg ,1 tab daily ), Disp: 90 tablet, Rfl: 3    aspirin 81 MG tablet, Take 81 mg by mouth every morning  , Disp: , Rfl:     bumetanide (BUMEX) 2 mg tablet, Take 0 5 tablets (1 mg total) by mouth 2 (two) times a day, Disp: 90 tablet, Rfl: 0    cholecalciferol (VITAMIN D3) 1,000 units tablet, Take 1 tablet by mouth daily, Disp: , Rfl:     colchicine (COLCRYS) 0 6 mg tablet, Take 0 6 mg by mouth daily as needed Only in gout flare   , Disp: , Rfl:     diclofenac sodium (VOLTAREN) 1 %, Apply 2 g topically 4 (four) times a day, Disp: 100 g, Rfl: 0    gabapentin (NEURONTIN) 300 mg capsule, Take 1 capsule (300 mg total) by mouth daily at bedtime for 90 days, Disp: 90 capsule, Rfl: 0    levothyroxine 100 mcg tablet, TAKE 1 TABLET EVERY DAY, Disp: 90 tablet, Rfl: 1    propranolol (INDERAL) 80 mg tablet, TAKE 1/2 TABLET EVERY 12 HOURS DAILY, Disp: 90 tablet, Rfl: 3    spironolactone (ALDACTONE) 25 mg tablet, Take 1 tablet (25 mg total) by mouth daily, Disp: 90 tablet, Rfl: 1    Omega-3-acid Ethyl Esters & D3 1 & 1000 GM & UNIT KIT, Take 2 capsules by mouth 2 (two) times a day, Disp: , Rfl:     omeprazole (PriLOSEC) 20 mg delayed release capsule, Take 1 capsule (20 mg total) by mouth every morning, Disp: 90 capsule, Rfl: 1    predniSONE 10 mg tablet, Take 1 tablet (10 mg total) by mouth daily, Disp: 5 tablet, Rfl: 0    Laboratory Results:    Results from last 7 days  Lab Units 10/26/18  0837   WBC Thousand/uL 6 74   HEMOGLOBIN g/dL 11 4*   HEMATOCRIT % 36 1   PLATELETS Thousands/uL 175   SODIUM mmol/L 138   POTASSIUM mmol/L 4 6   CHLORIDE mmol/L 103   CO2 mmol/L 28   BUN mg/dL 46*   CREATININE mg/dL 1 66*   CALCIUM mg/dL 9 3   PHOSPHORUS mg/dL 3 7       Results for orders placed or performed in visit on 10/26/18   Phosphorus   Result Value Ref Range    Phosphorus 3 7 2 3 - 4 1 mg/dL   Protein / creatinine ratio, urine   Result Value Ref Range    Creatinine, Ur 98 3 mg/dL    Protein Urine Random 17 mg/dL    Prot/Creat Ratio, Ur 0 17 (H) 0 00 - 0 10   PTH, intact   Result Value Ref Range     4 (H) 18 4 - 80 1 pg/mL   Urinalysis with microscopic   Result Value Ref Range    Clarity, UA Cloudy     Color, UA Yellow     Specific [place], UA 1 019 1 003 - 1 030    pH, UA 7 0 4 5 - 8 0    Glucose, UA Negative Negative mg/dl    Ketones, UA Negative Negative mg/dl    Blood, UA Negative Negative    Protein, UA Negative Negative mg/dl    Nitrite, UA Negative Negative    Bilirubin, UA Negative Negative    Urobilinogen, UA 0 2 0 2, 1 0 E U /dl E U /dl    Leukocytes, UA Moderate (A) Negative    WBC, UA 10-20 (A) None Seen, 0-5, 5-55, 5-65 /hpf    RBC, UA None Seen None Seen, 0-5 /hpf    Hyaline Casts, UA None Seen None Seen /lpf    Bacteria, UA Occasional None Seen, Occasional /hpf    Epithelial Cells Moderate (A) None Seen, Occasional /hpf Vitamin D 25 hydroxy   Result Value Ref Range    Vit D, 25-Hydroxy 48 4 30 0 - 100 0 ng/mL

## 2018-10-30 NOTE — TELEPHONE ENCOUNTER
S/w pt - doing better; weight 117 - down 2 lbs; taking water pill 1/2 in the a m  And 1/2 in the p m  No shortness of breath now

## 2018-11-02 ENCOUNTER — TELEPHONE (OUTPATIENT)
Dept: PHYSICAL THERAPY | Facility: CLINIC | Age: 78
End: 2018-11-02

## 2018-11-02 ENCOUNTER — APPOINTMENT (OUTPATIENT)
Dept: PHYSICAL THERAPY | Facility: CLINIC | Age: 78
End: 2018-11-02
Payer: MEDICARE

## 2018-11-02 NOTE — TELEPHONE ENCOUNTER
Rosalind reported she had an increase in fluid in her legs which resulted in evaluation for heart failure  She also reported she was instructed to hold on physical therapy until 11/05/2018 based on her symptoms

## 2018-11-05 ENCOUNTER — TRANSCRIBE ORDERS (OUTPATIENT)
Dept: PHYSICAL THERAPY | Facility: CLINIC | Age: 78
End: 2018-11-05

## 2018-11-05 ENCOUNTER — OFFICE VISIT (OUTPATIENT)
Dept: PHYSICAL THERAPY | Facility: CLINIC | Age: 78
End: 2018-11-05
Payer: MEDICARE

## 2018-11-05 DIAGNOSIS — R26.2 DIFFICULTY WALKING: Primary | ICD-10-CM

## 2018-11-05 DIAGNOSIS — R26.89 BALANCE DISORDER: ICD-10-CM

## 2018-11-05 DIAGNOSIS — R26.2 DIFFICULTY IN WALKING: Primary | ICD-10-CM

## 2018-11-05 DIAGNOSIS — M54.16 RADICULOPATHY OF LUMBAR REGION: ICD-10-CM

## 2018-11-05 PROCEDURE — 97112 NEUROMUSCULAR REEDUCATION: CPT | Performed by: PHYSICAL MEDICINE & REHABILITATION

## 2018-11-05 PROCEDURE — 97140 MANUAL THERAPY 1/> REGIONS: CPT | Performed by: PHYSICAL MEDICINE & REHABILITATION

## 2018-11-05 PROCEDURE — G8979 MOBILITY GOAL STATUS: HCPCS | Performed by: PHYSICAL MEDICINE & REHABILITATION

## 2018-11-05 PROCEDURE — G8978 MOBILITY CURRENT STATUS: HCPCS | Performed by: PHYSICAL MEDICINE & REHABILITATION

## 2018-11-05 NOTE — LETTER
2018    Demarco Anderson, [address] [facility] [address]    Patient: Angela Rasmussen   YOB: 1940   Date of Visit: 2018     Encounter Diagnosis     ICD-10-CM    1  Difficulty in walking R26 2    2  Radiculopathy of lumbar region M54 16    3  Balance disorder R26 89        Dear Dr Wilton Castillo:    Please review the attached Plan of Care from [facility]'s recent visit  Please verify that you agree therapy should continue by signing the attached document and sending it back to our office  If you have any questions or concerns, please don't hesitate to call  Sincerely,    Claudio Almanza      Referring Provider:      I certify that I have read the below Plan of Care and certify the need for these services furnished under this plan of treatment while under my care  Demarco Anderson DPM  [address] Re-Evaluation     Today's date: 2018  Patient name: Angela Rasmussen  : 1940  MRN: [id number]  Referring provider: Ruma Wright DPM  Dx:   Encounter Diagnosis     ICD-10-CM    1  Difficulty in walking R26 2    2  Radiculopathy of lumbar region M54 16    3  Balance disorder R26 89                   Assessment  Impairments: abnormal coordination, abnormal gait, abnormal muscle firing, abnormal muscle tone, abnormal or restricted ROM, abnormal movement, activity intolerance, impaired balance, impaired physical strength, lacks appropriate home exercise program, pain with function and poor body mechanics    Assessment details: Angela Rasmussen is a pleasant 65 y o  female who presents with symptoms consistent with lumbar radiculopathy, balance deficits and lumbar stenosis  The patient's greatest concerns are fear of not being able to keep active  [facility] has made progress towards her goals in pain, ROM, and strength   Skilled physical therapy is warranted to continue to allow her to make progress towards her goals  However, physical therapy is put on hold until clearance from her cardiologist is confirmed  Understanding of Dx/Px/POC: fair   Prognosis: fair  Prognosis details: Positive prognostic indicators include positive attitude toward recovery  Negative prognostic indicators include chronicity of symptoms, hypertension, high symptom irritability and high BMI  Goals  Patient will be independent with home exercise program  - partially met  Patient will be able to manage symptoms independently  - partially met  Patient will be able to stand for 10 minutes without limitation due to pain  - met   Patient will be able to walk around her house without limitation due to pain  - met  Patient will be able to squat to  objects from the floor without limitation due to pain  - partially met    Plan  Patient would benefit from: skilled PT  Referral necessary: No  Planned modality interventions: thermotherapy: hydrocollator packs, cryotherapy and TENS  Planned therapy interventions: abdominal trunk stabilization, activity modification, joint mobilization, manual therapy, motor coordination training, neuromuscular re-education, patient education, self care, therapeutic activities, therapeutic exercise, graded activity, home exercise program, behavior modification, gait training, transfer training and postural training  Frequency: 2x week  Duration in weeks: 4  Treatment plan discussed with: patient  Plan details: Prognosis above is given PT services 2x/week tapering to 1x/week over the next 2 months and home program adherence    Pending clearance from her cardiologist         Subjective Evaluation    History of Present Illness  Mechanism of injury: Rosalind reported "my back feels better but I just feel tired and wiped out because of my heart "  Pain  Current pain ratin  At best pain ratin  At worst pain ratin    Patient Goals  Patient goal: be able to walk farther, be able to walk around her house a little bit        Objective     Static Posture     Comments  ROM:  Flexion: 50% limited  Extension: 60% limited    Step up: - not tested 11/5  Increased forward flexion and decreased balance    Squats: - not tested 11/5  Decreased right weight bearing, increased forward flexion    SLR:  R: 65  L: 45    MMTs:  Hip flexion: R: 4/5 L: 4/5  Knee ext: R: 4/5, L: 4/5  Knee flex: R: 4-/5, L: 4-/5  DF: R: 4/5, L: 4/5  PF: R: 3/5 L: 3/5    Romberg: - not tested 11/5  Feet together: 22sec  Eyes closed: unable to perform - further testing was not performed due to inability to pass eyes closed feet together          HR: 64  BP: 150/95     Precautions: hypertension,     Daily Treatment Diary     Manual  10/5/2018 10/8/2018 10/12/2018 10/15/2018 10/19/2018 10/26/2018 10/26 11/5/2018     DKTC  10min 10min 10min 10min 10min                                                               Exercise Diary        10/26      Recumbent bike  5min 5min 5min treadmill 1/8th a mile 5 min 5min 5 mins       *Seated Flexion  3x10 3x10 3x15 3x15  2 x15      *TrA contraction  3x12 3x12 3x15 3x15  5" x 20       TrA leg fall outs  3x15 3x15 3x15 3x15  2 x15   YTB      *SLR - delia  2x10x2 3x10x2 3x12x2 3x12x2  1 x 15 x 2"      Bridges   3x10 3x12 3x12  2 x15      Squats    1/4 1x10 1/2 3x10           Heel raises     3x10        treadmill      1MPH - 6min 1MPH  4'                                                                                                                           Patient education on congestive heart failure and importance of follow-up             HEP Teaching and return Demonstration 10min                Modalities

## 2018-11-05 NOTE — PROGRESS NOTES
PT Re-Evaluation     Today's date: 2018  Patient name: Manish Gold  : 1940  MRN: [id number]  Referring provider: Dane Claudio DPM  Dx:   Encounter Diagnosis     ICD-10-CM    1  Difficulty in walking R26 2    2  Radiculopathy of lumbar region M54 16    3  Balance disorder R26 89                   Assessment  Impairments: abnormal coordination, abnormal gait, abnormal muscle firing, abnormal muscle tone, abnormal or restricted ROM, abnormal movement, activity intolerance, impaired balance, impaired physical strength, lacks appropriate home exercise program, pain with function and poor body mechanics    Assessment details: Manish Gold is a pleasant 65 y o  female who presents with symptoms consistent with lumbar radiculopathy, balance deficits and lumbar stenosis  The patient's greatest concerns are fear of not being able to keep active  Megan Morgan has made progress towards her goals in pain, ROM, and strength  Skilled physical therapy is warranted to continue to allow her to make progress towards her goals  However, physical therapy is put on hold until clearance from her cardiologist is confirmed  Understanding of Dx/Px/POC: fair   Prognosis: fair  Prognosis details: Positive prognostic indicators include positive attitude toward recovery  Negative prognostic indicators include chronicity of symptoms, hypertension, high symptom irritability and high BMI  Goals  Patient will be independent with home exercise program  - partially met  Patient will be able to manage symptoms independently  - partially met  Patient will be able to stand for 10 minutes without limitation due to pain  - met   Patient will be able to walk around her house without limitation due to pain  - met  Patient will be able to squat to  objects from the floor without limitation due to pain   - partially met    Plan  Patient would benefit from: skilled PT  Referral necessary: No  Planned modality interventions: thermotherapy: hydrocollator packs, cryotherapy and TENS  Planned therapy interventions: abdominal trunk stabilization, activity modification, joint mobilization, manual therapy, motor coordination training, neuromuscular re-education, patient education, self care, therapeutic activities, therapeutic exercise, graded activity, home exercise program, behavior modification, gait training, transfer training and postural training  Frequency: 2x week  Duration in weeks: 4  Treatment plan discussed with: patient  Plan details: Prognosis above is given PT services 2x/week tapering to 1x/week over the next 2 months and home program adherence  Pending clearance from her cardiologist         Subjective Evaluation    History of Present Illness  Mechanism of injury: Rosalind reported "my back feels better but I just feel tired and wiped out because of my heart "  Pain  Current pain ratin  At best pain ratin  At worst pain ratin    Patient Goals  Patient goal: be able to walk farther, be able to walk around her house a little bit        Objective     Static Posture     Comments  ROM:  Flexion: 50% limited  Extension: 60% limited    Step up: - not tested   Increased forward flexion and decreased balance    Squats: - not tested   Decreased right weight bearing, increased forward flexion    SLR:  R: 65  L: 45    MMTs:  Hip flexion: R: 4/5 L: 4/5  Knee ext: R: 4/5, L: 4/5  Knee flex: R: 4-/5, L: 4-/5  DF: R: 4/5, L: 4/5  PF: R: 3/5 L: 3/5    Romberg: - not tested   Feet together: 22sec  Eyes closed: unable to perform - further testing was not performed due to inability to pass eyes closed feet together          HR: 64  BP: 150/95     Precautions: hypertension,     Daily Treatment Diary     Manual  10/5/2018 10/8/2018 10/12/2018 10/15/2018 10/19/2018 10/26/2018 10/26 2018     DKTC  10min 10min 10min 10min 10min                                                               Exercise Diary        10/26 Recumbent bike  5min 5min 5min treadmill 1/8th a mile 5 min 5min 5 mins       *Seated Flexion  3x10 3x10 3x15 3x15  2 x15      *TrA contraction  3x12 3x12 3x15 3x15  5" x 20       TrA leg fall outs  3x15 3x15 3x15 3x15  2 x15   YTB      *SLR - delia  2x10x2 3x10x2 3x12x2 3x12x2  1 x 15 x 2"      Bridges   3x10 3x12 3x12  2 x15      Squats    1/4 1x10 1/2 3x10           Heel raises     3x10        treadmill      1MPH - 6min 1MPH  4'                                                                                                                           Patient education on congestive heart failure and importance of follow-up             HEP Teaching and return Demonstration 10min                Modalities

## 2018-11-09 ENCOUNTER — LAB (OUTPATIENT)
Dept: LAB | Facility: CLINIC | Age: 78
End: 2018-11-09
Payer: MEDICARE

## 2018-11-09 ENCOUNTER — APPOINTMENT (OUTPATIENT)
Dept: PHYSICAL THERAPY | Facility: CLINIC | Age: 78
End: 2018-11-09
Payer: MEDICARE

## 2018-11-09 DIAGNOSIS — N17.9 AKI (ACUTE KIDNEY INJURY) (HCC): ICD-10-CM

## 2018-11-09 LAB
ANION GAP SERPL CALCULATED.3IONS-SCNC: 6 MMOL/L (ref 4–13)
BUN SERPL-MCNC: 81 MG/DL (ref 5–25)
CALCIUM SERPL-MCNC: 9.4 MG/DL (ref 8.3–10.1)
CHLORIDE SERPL-SCNC: 106 MMOL/L (ref 100–108)
CO2 SERPL-SCNC: 26 MMOL/L (ref 21–32)
CREAT SERPL-MCNC: 1.76 MG/DL (ref 0.6–1.3)
GFR SERPL CREATININE-BSD FRML MDRD: 27 ML/MIN/1.73SQ M
GLUCOSE P FAST SERPL-MCNC: 105 MG/DL (ref 65–99)
POTASSIUM SERPL-SCNC: 4.4 MMOL/L (ref 3.5–5.3)
SODIUM SERPL-SCNC: 138 MMOL/L (ref 136–145)

## 2018-11-09 PROCEDURE — 36415 COLL VENOUS BLD VENIPUNCTURE: CPT

## 2018-11-09 PROCEDURE — 80048 BASIC METABOLIC PNL TOTAL CA: CPT

## 2018-11-09 NOTE — PROGRESS NOTES
Hello    Patient normally is followed up by Ms Kiran Luther  Can the patient be notified that the creatinine is still higher  It is 1 76 from 1 66  This is not a big rise  But overall from baseline this is higher  Can the patient repeat a BMP in 1 month along with SPEP, UPEP, C3, C4, free light chains  This may be the new baseline in the setting of volume overload that has now improved since increasing the diuretics  Prior urine studies reviewed      Thank you    np

## 2018-11-12 ENCOUNTER — APPOINTMENT (OUTPATIENT)
Dept: PHYSICAL THERAPY | Facility: CLINIC | Age: 78
End: 2018-11-12
Payer: MEDICARE

## 2018-11-12 ENCOUNTER — TELEPHONE (OUTPATIENT)
Dept: NEPHROLOGY | Facility: CLINIC | Age: 78
End: 2018-11-12

## 2018-11-12 DIAGNOSIS — N18.30 CKD (CHRONIC KIDNEY DISEASE), STAGE III (HCC): Primary | ICD-10-CM

## 2018-11-12 NOTE — TELEPHONE ENCOUNTER
----- Message from Yvette Buckley MD sent at 11/9/2018  6:08 PM EST -----  Hello    Patient normally is followed up by Ms Shannan Davila  Can the patient be notified that the creatinine is still higher  It is 1 76 from 1 66  This is not a big rise  But overall from baseline this is higher  Can the patient repeat a BMP in 1 month along with SPEP, UPEP, C3, C4, free light chains  This may be the new baseline in the setting of volume overload that has now improved since increasing the diuretics  Prior urine studies reviewed      Thank you    np

## 2018-11-16 ENCOUNTER — APPOINTMENT (OUTPATIENT)
Dept: PHYSICAL THERAPY | Facility: CLINIC | Age: 78
End: 2018-11-16
Payer: MEDICARE

## 2018-11-26 NOTE — PROGRESS NOTES
PT Discharge    Today's date: 2018  Patient name: Mirian Welch  : 1940  MRN: [id number]  Referring provider: Maryana Arzate DPM  Dx:   Encounter Diagnosis     ICD-10-CM    1  Difficulty in walking R26 2    2  Radiculopathy of lumbar region M54 16    3  Balance disorder R26 89      Patient denies therapy at this time secondary to other health impairments      Start Time: 1100  Stop Time: 1129  Total time in clinic (min): 29 minutes    Assessment/Plan    Subjective    Objective    Flowsheet Rows      Most Recent Value   PT/OT G-Codes   Current Score  28   Projected Score  51   FOTO information reviewed  Yes   Assessment Type  Re-evaluation   G code set  Mobility: Walking & Moving Around   Mobility: Walking and Moving Around Current Status ()  CL   Mobility: Walking and Moving Around Goal Status ()  [facility] AND [facility]

## 2018-12-03 ENCOUNTER — OFFICE VISIT (OUTPATIENT)
Dept: FAMILY MEDICINE CLINIC | Facility: CLINIC | Age: 78
End: 2018-12-03
Payer: MEDICARE

## 2018-12-03 VITALS
BODY MASS INDEX: 41.91 KG/M2 | SYSTOLIC BLOOD PRESSURE: 118 MMHG | OXYGEN SATURATION: 97 % | HEIGHT: 61 IN | WEIGHT: 222 LBS | DIASTOLIC BLOOD PRESSURE: 60 MMHG | HEART RATE: 55 BPM

## 2018-12-03 DIAGNOSIS — I10 BENIGN ESSENTIAL HYPERTENSION: ICD-10-CM

## 2018-12-03 DIAGNOSIS — I50.40 COMBINED SYSTOLIC AND DIASTOLIC HEART FAILURE, UNSPECIFIED HF CHRONICITY (HCC): ICD-10-CM

## 2018-12-03 DIAGNOSIS — G47.33 OBSTRUCTIVE SLEEP APNEA: ICD-10-CM

## 2018-12-03 DIAGNOSIS — D50.8 IRON DEFICIENCY ANEMIA SECONDARY TO INADEQUATE DIETARY IRON INTAKE: ICD-10-CM

## 2018-12-03 DIAGNOSIS — N18.30 CKD (CHRONIC KIDNEY DISEASE), STAGE III (HCC): ICD-10-CM

## 2018-12-03 DIAGNOSIS — I50.33 ACUTE ON CHRONIC DIASTOLIC CONGESTIVE HEART FAILURE (HCC): ICD-10-CM

## 2018-12-03 DIAGNOSIS — K21.9 GASTROESOPHAGEAL REFLUX DISEASE WITHOUT ESOPHAGITIS: ICD-10-CM

## 2018-12-03 DIAGNOSIS — E03.9 HYPOTHYROIDISM, UNSPECIFIED TYPE: ICD-10-CM

## 2018-12-03 DIAGNOSIS — M10.9 GOUT OF WRIST, UNSPECIFIED CAUSE, UNSPECIFIED CHRONICITY, UNSPECIFIED LATERALITY: ICD-10-CM

## 2018-12-03 DIAGNOSIS — E66.01 MORBID OBESITY WITH BODY MASS INDEX OF 40.0-44.9 IN ADULT (HCC): ICD-10-CM

## 2018-12-03 DIAGNOSIS — K44.9 HIATAL HERNIA: ICD-10-CM

## 2018-12-03 DIAGNOSIS — L30.9 ECZEMA, UNSPECIFIED TYPE: Primary | ICD-10-CM

## 2018-12-03 PROCEDURE — 99214 OFFICE O/P EST MOD 30 MIN: CPT | Performed by: FAMILY MEDICINE

## 2018-12-03 RX ORDER — OMEPRAZOLE 20 MG/1
20 CAPSULE, DELAYED RELEASE ORAL DAILY
Qty: 90 CAPSULE | Refills: 1 | Status: SHIPPED | OUTPATIENT
Start: 2018-12-03 | End: 2018-12-11

## 2018-12-03 RX ORDER — ALLOPURINOL 300 MG/1
300 TABLET ORAL EVERY MORNING
Qty: 90 TABLET | Refills: 1 | Status: SHIPPED | OUTPATIENT
Start: 2018-12-03 | End: 2019-04-17

## 2018-12-03 RX ORDER — LEVOTHYROXINE SODIUM 112 UG/1
112 TABLET ORAL DAILY
Qty: 90 TABLET | Refills: 1 | Status: SHIPPED | OUTPATIENT
Start: 2018-12-03 | End: 2019-05-18 | Stop reason: SDUPTHER

## 2018-12-03 RX ORDER — SPIRONOLACTONE 25 MG/1
25 TABLET ORAL DAILY
Qty: 90 TABLET | Refills: 1 | Status: SHIPPED | OUTPATIENT
Start: 2018-12-03 | End: 2019-02-26

## 2018-12-03 RX ORDER — CLOBETASOL PROPIONATE 0.5 MG/G
OINTMENT TOPICAL 2 TIMES DAILY
Qty: 30 G | Refills: 1 | Status: SHIPPED | OUTPATIENT
Start: 2018-12-03 | End: 2019-08-07 | Stop reason: ALTCHOICE

## 2018-12-03 NOTE — PROGRESS NOTES
Subjective:      Patient ID: Angela Rasmussen is a 77 y o  female  Thyroid Problem   Presents for follow-up visit  Patient reports no anxiety, cold intolerance, depressed mood, fatigue, heat intolerance, palpitations or tremors  Symptom course: TSH 5 2  MCG LEVOTHYROXINE  Her past medical history is significant for heart failure  Rash   This is a chronic problem  The current episode started more than 1 year ago  The problem is unchanged  The affected locations include the left arm, right arm, left lower leg, right lower leg and back  The rash is characterized by dryness, itchiness and scaling  She was exposed to nothing  Pertinent negatives include no fatigue or shortness of breath  Past treatments include topical steroids (cLOBETASOL 0 5 %)  The treatment provided significant relief  Her past medical history is significant for eczema  Hypertension   This is a chronic problem  The current episode started more than 1 year ago  The problem is controlled  Associated symptoms include peripheral edema  Pertinent negatives include no blurred vision, chest pain, headaches, orthopnea, palpitations or shortness of breath  Treatments tried: AMLODIPINE- OLMESARTAN 10-40 MG, PROPRANOLOL 40 MG BID  The current treatment provides significant improvement  There are no compliance problems  Hypertensive end-organ damage includes kidney disease and heart failure  Identifiable causes of hypertension include a thyroid problem  Heartburn   She complains of belching and heartburn  She reports no chest pain  This is a chronic problem  The problem has been unchanged  The heartburn is located in the substernum  The heartburn is of moderate intensity  The symptoms are aggravated by certain foods  Pertinent negatives include no fatigue  Risk factors include hiatal hernia  She has tried a PPI for the symptoms  The treatment provided significant relief      Patient is currently taking allopurinol 300 milligram daily for control of gout  Denies any new episodes of gout  Follows up with Nephrology regarding CKD and Cardiology regarding CHF  Patient states that she has upcoming appointment with both of the specialists this week  Patient used to follow-up with dermatology regarding eczema and was prescribed clobetasol 0 05% ointment  Patient states that the ointment helps control the itching  She was prescribed a higher dose of oral prednisone the which  helped control her symptoms  She does complain of symptoms of low back pain, which is worse today  Patient states that she has seen pain management and received injections in her back  The injection did not help   She has spinal stenosis and lumbar radiculopathy  Patient states that gabapentin helps with pain in her legs but not with the back pain  Past Medical History:   Diagnosis Date    Arthritis     joints    Cataract     Disease of thyroid gland     Eczema     GERD (gastroesophageal reflux disease)     Gout     Heart failure (HCC)     Hepatitis     Hiatal hernia     Hypertension     Onychomycosis     last assessed: 16    Orthopnea     resolved: 16    Sleep apnea     wears CPAP       Family History   Problem Relation Age of Onset    Diabetes Mother     Coronary artery disease Mother     Arthritis Mother     Hypertension Mother     Hypertension Father     Diabetes Brother     Diabetes Son     Arthritis Family        Past Surgical History:   Procedure Laterality Date    ABDOMINAL SURGERY          BREAST SURGERY Right     cyst removal    CARPAL TUNNEL RELEASE Left     CARPAL TUNNEL RELEASE Right     CATARACT EXTRACTION       SECTION  1960    x1    COLONOSCOPY N/A 2018    Procedure: COLONOSCOPY;  Surgeon: Marino Martinez MD;  Location: [place] [address] [facility];   Service: Gastroenterology    DILATION AND CURETTAGE OF UTERUS  1967    EYE SURGERY Left 2006    cataracts    HERNIA REPAIR      umbilical hernia    HYSTERECTOMY  INCISIONAL HERNIA REPAIR      incarcerated    KNEE ARTHROSCOPY Right     MO REMV CATARACT EXTRACAP,INSERT LENS Right 3/27/2017    Procedure: EXTRACTION EXTRACAPSULAR CATARACT PHACO INTRAOCULAR LENS (IOL); Surgeon: Geovanna Land MD;  Location: [facility] MAIN OR;  Service: Ophthalmology        reports that she has never smoked  She has never used smokeless tobacco  She reports that she does not drink alcohol or use drugs        Current Outpatient Prescriptions:     allopurinol (ZYLOPRIM) 300 mg tablet, Take 1 tablet (300 mg total) by mouth every morning, Disp: 90 tablet, Rfl: 1    amlodipine-olmesartan (BLACK) 10-40 MG, TAKE 1 TABLET ONE TIME DAILY (Patient taking differently: pt takes 5-20mg ,1 tab daily ), Disp: 90 tablet, Rfl: 3    aspirin 81 MG tablet, Take 81 mg by mouth every morning  , Disp: , Rfl:     bumetanide (BUMEX) 2 mg tablet, Take 0 5 tablets (1 mg total) by mouth 2 (two) times a day, Disp: 90 tablet, Rfl: 0    cholecalciferol (VITAMIN D3) 1,000 units tablet, Take 1 tablet by mouth daily, Disp: , Rfl:     colchicine (COLCRYS) 0 6 mg tablet, Take 0 6 mg by mouth daily as needed Only in gout flare   , Disp: , Rfl:     diclofenac sodium (VOLTAREN) 1 %, Apply 2 g topically 4 (four) times a day, Disp: 100 g, Rfl: 0    gabapentin (NEURONTIN) 300 mg capsule, Take 1 capsule (300 mg total) by mouth daily at bedtime for 90 days, Disp: 90 capsule, Rfl: 0    omeprazole (PriLOSEC) 20 mg delayed release capsule, Take 1 capsule (20 mg total) by mouth every morning, Disp: 90 capsule, Rfl: 1    propranolol (INDERAL) 80 mg tablet, TAKE 1/2 TABLET EVERY 12 HOURS DAILY, Disp: 90 tablet, Rfl: 3    spironolactone (ALDACTONE) 25 mg tablet, Take 1 tablet (25 mg total) by mouth daily, Disp: 90 tablet, Rfl: 1    clobetasol (TEMOVATE) 0 05 % ointment, Apply topically 2 (two) times a day, Disp: 30 g, Rfl: 1    Crisaborole (EUCRISA) 2 % OINT, Apply twice daily, Disp: 60 g, Rfl: 0    hydrocortisone 2 5 % cream, Apply topically 2 (two) times a day, Disp: 30 g, Rfl: 2    levothyroxine 112 mcg tablet, Take 1 tablet (112 mcg total) by mouth daily, Disp: 90 tablet, Rfl: 1    Omega-3-acid Ethyl Esters & D3 1 & 1000 GM & UNIT KIT, Take 2 capsules by mouth 2 (two) times a day, Disp: , Rfl:     omeprazole (PriLOSEC) 20 mg delayed release capsule, Take 1 capsule (20 mg total) by mouth daily, Disp: 90 capsule, Rfl: 1    The following portions of the patient's history were reviewed and updated as appropriate: allergies, current medications, past family history, past medical history, past social history, past surgical history and problem list     Review of Systems   Constitutional: Negative  Negative for fatigue  Eyes: Negative for blurred vision  Respiratory: Negative  Negative for shortness of breath  Cardiovascular: Negative  Negative for chest pain, palpitations and orthopnea  Gastrointestinal: Positive for heartburn  Endocrine: Negative for cold intolerance and heat intolerance  Musculoskeletal: Negative for myalgias  Skin: Positive for rash  Neurological: Negative  Negative for tremors and headaches  Psychiatric/Behavioral: Negative  The patient is not nervous/anxious  Objective:    /60   Pulse 55   Ht 5' 1" (1 549 m)   Wt 101 kg (222 lb)   SpO2 97%   BMI 41 95 kg/m²      Physical Exam   Constitutional: She is oriented to person, place, and time  She appears well-developed and well-nourished  Cardiovascular: Normal rate, regular rhythm and normal heart sounds  Pulmonary/Chest: Effort normal and breath sounds normal    Abdominal: Soft  Bowel sounds are normal    Musculoskeletal: Edema: +1 on both lower legs  Neurological: She is alert and oriented to person, place, and time  Skin: Rash (PATCHES OF DRY SKIN ON ARMS, LEGS AND UPPER BACK  ) noted     Psychiatric: Her behavior is normal  Judgment normal          Recent Results (from the past 1008 hour(s))   CBC and differential Collection Time: 10/26/18  8:37 AM   Result Value Ref Range    WBC 6 74 4 31 - 10 16 Thousand/uL    RBC 3 91 3 81 - 5 12 Million/uL    Hemoglobin 11 4 (L) 11 5 - 15 4 g/dL    Hematocrit 36 1 34 8 - 46 1 %    MCV 92 82 - 98 fL    MCH 29 2 26 8 - 34 3 pg    MCHC 31 6 31 4 - 37 4 g/dL    RDW 15 9 (H) 11 6 - 15 1 %    MPV 12 3 8 9 - 12 7 fL    Platelets 887 101 - 609 Thousands/uL    nRBC 0 /100 WBCs    Neutrophils Relative 76 (H) 43 - 75 %    Immat GRANS % 0 0 - 2 %    Lymphocytes Relative 11 (L) 14 - 44 %    Monocytes Relative 9 4 - 12 %    Eosinophils Relative 4 0 - 6 %    Basophils Relative 0 0 - 1 %    Neutrophils Absolute 5 09 1 85 - 7 62 Thousands/µL    Immature Grans Absolute 0 03 0 00 - 0 20 Thousand/uL    Lymphocytes Absolute 0 75 0 60 - 4 47 Thousands/µL    Monocytes Absolute 0 60 0 17 - 1 22 Thousand/µL    Eosinophils Absolute 0 24 0 00 - 0 61 Thousand/µL    Basophils Absolute 0 03 0 00 - 0 10 Thousands/µL   Comprehensive metabolic panel    Collection Time: 10/26/18  8:37 AM   Result Value Ref Range    Sodium 138 136 - 145 mmol/L    Potassium 4 6 3 5 - 5 3 mmol/L    Chloride 103 100 - 108 mmol/L    CO2 28 21 - 32 mmol/L    ANION GAP 7 4 - 13 mmol/L    BUN 46 (H) 5 - 25 mg/dL    Creatinine 1 66 (H) 0 60 - 1 30 mg/dL    Glucose, Fasting 104 (H) 65 - 99 mg/dL    Calcium 9 3 8 3 - 10 1 mg/dL    AST 16 5 - 45 U/L    ALT 21 12 - 78 U/L    Alkaline Phosphatase 132 (H) 46 - 116 U/L    Total Protein 7 4 6 4 - 8 2 g/dL    Albumin 3 5 3 5 - 5 0 g/dL    Total Bilirubin 0 34 0 20 - 1 00 mg/dL    eGFR 29 ml/min/1 73sq m   Lipid panel    Collection Time: 10/26/18  8:37 AM   Result Value Ref Range    Cholesterol 203 (H) 50 - 200 mg/dL    Triglycerides 159 (H) <=150 mg/dL    HDL, Direct 45 40 - 60 mg/dL    LDL Calculated 126 (H) 0 - 100 mg/dL    Non-HDL-Chol (CHOL-HDL) 158 mg/dl   TSH, 3rd generation with T4 reflex    Collection Time: 10/26/18  8:37 AM   Result Value Ref Range    TSH 3RD GENERATON 5 230 (H) 0 358 - 3 740 uIU/mL   Phosphorus    Collection Time: 10/26/18  8:37 AM   Result Value Ref Range    Phosphorus 3 7 2 3 - 4 1 mg/dL   Protein / creatinine ratio, urine    Collection Time: 10/26/18  8:37 AM   Result Value Ref Range    Creatinine, Ur 98 3 mg/dL    Protein Urine Random 17 mg/dL    Prot/Creat Ratio, Ur 0 17 (H) 0 00 - 0 10   PTH, intact    Collection Time: 10/26/18  8:37 AM   Result Value Ref Range     4 (H) 18 4 - 80 1 pg/mL   Urinalysis with microscopic    Collection Time: 10/26/18  8:37 AM   Result Value Ref Range    Clarity, UA Cloudy     Color, UA Yellow     Specific [place], UA 1 019 1 003 - 1 030    pH, UA 7 0 4 5 - 8 0    Glucose, UA Negative Negative mg/dl    Ketones, UA Negative Negative mg/dl    Blood, UA Negative Negative    Protein, UA Negative Negative mg/dl    Nitrite, UA Negative Negative    Bilirubin, UA Negative Negative    Urobilinogen, UA 0 2 0 2, 1 0 E U /dl E U /dl    Leukocytes, UA Moderate (A) Negative    WBC, UA 10-20 (A) None Seen, 0-5, 5-55, 5-65 /hpf    RBC, UA None Seen None Seen, 0-5 /hpf    Hyaline Casts, UA None Seen None Seen /lpf    Bacteria, UA Occasional None Seen, Occasional /hpf    Epithelial Cells Moderate (A) None Seen, Occasional /hpf   Vitamin D 25 hydroxy    Collection Time: 10/26/18  8:37 AM   Result Value Ref Range    Vit D, 25-Hydroxy 48 4 30 0 - 100 0 ng/mL   T4, free    Collection Time: 10/26/18  8:37 AM   Result Value Ref Range    Free T4 1 30 0 76 - 1 46 ng/dL   Basic metabolic panel    Collection Time: 11/09/18  8:52 AM   Result Value Ref Range    Sodium 138 136 - 145 mmol/L    Potassium 4 4 3 5 - 5 3 mmol/L    Chloride 106 100 - 108 mmol/L    CO2 26 21 - 32 mmol/L    ANION GAP 6 4 - 13 mmol/L    BUN 81 (H) 5 - 25 mg/dL    Creatinine 1 76 (H) 0 60 - 1 30 mg/dL    Glucose, Fasting 105 (H) 65 - 99 mg/dL    Calcium 9 4 8 3 - 10 1 mg/dL    eGFR 27 ml/min/1 73sq m       Assessment/Plan:    Patient's blood pressure is at goal continue amlodipine-olmesartan 10 - 40 milligram daily  TSH is borderline high, will increase levothyroxine to 112 microgram daily  Recheck TSH after 3 months and follow up thereafter  Patient states that the insurance does not cover the clobetasol ointment so she is requesting a printout and would like to check alternatives so I gave her a prescription for hydrocortisone  Patient made aware of the common side effects of applying steroid on the skin for a prolonged period of time  Patient states that she use it only intermittently if her symptoms are bad  Patient encouraged to follow up with pain management to discuss treatment options for her low back pain  Diagnoses and all orders for this visit:    Eczema, unspecified type  -     clobetasol (TEMOVATE) 0 05 % ointment; Apply topically 2 (two) times a day  -     Crisaborole (EUCRISA) 2 % OINT; Apply twice daily  -     hydrocortisone 2 5 % cream; Apply topically 2 (two) times a day    Gout of wrist, unspecified cause, unspecified chronicity, unspecified laterality  -     allopurinol (ZYLOPRIM) 300 mg tablet; Take 1 tablet (300 mg total) by mouth every morning    Combined systolic and diastolic heart failure, unspecified HF chronicity (HCC)  -     spironolactone (ALDACTONE) 25 mg tablet; Take 1 tablet (25 mg total) by mouth daily    Gastroesophageal reflux disease without esophagitis  -     omeprazole (PriLOSEC) 20 mg delayed release capsule; Take 1 capsule (20 mg total) by mouth daily    Hiatal hernia  -     omeprazole (PriLOSEC) 20 mg delayed release capsule;  Take 1 capsule (20 mg total) by mouth daily    Obstructive sleep apnea    Benign essential hypertension    Acute on chronic diastolic congestive heart failure (HCC)    CKD (chronic kidney disease), stage III ([facility] [address] )    Morbid obesity with body mass index of 40 0-44 9 in [facility])    Iron deficiency anemia secondary to inadequate dietary iron intake    Hypothyroidism, unspecified type  -     TSH, 3rd generation with Free T4 reflex; Future  -     levothyroxine 112 mcg tablet;  Take 1 tablet (112 mcg total) by mouth daily

## 2018-12-11 ENCOUNTER — APPOINTMENT (OUTPATIENT)
Dept: LAB | Facility: CLINIC | Age: 78
End: 2018-12-11
Payer: MEDICARE

## 2018-12-11 ENCOUNTER — OFFICE VISIT (OUTPATIENT)
Dept: CARDIOLOGY CLINIC | Facility: CLINIC | Age: 78
End: 2018-12-11
Payer: MEDICARE

## 2018-12-11 ENCOUNTER — OFFICE VISIT (OUTPATIENT)
Dept: PODIATRY | Facility: CLINIC | Age: 78
End: 2018-12-11
Payer: MEDICARE

## 2018-12-11 ENCOUNTER — LAB (OUTPATIENT)
Dept: LAB | Facility: CLINIC | Age: 78
End: 2018-12-11
Payer: MEDICARE

## 2018-12-11 VITALS
DIASTOLIC BLOOD PRESSURE: 56 MMHG | HEART RATE: 54 BPM | SYSTOLIC BLOOD PRESSURE: 146 MMHG | OXYGEN SATURATION: 97 % | HEIGHT: 61 IN | WEIGHT: 222 LBS | BODY MASS INDEX: 41.91 KG/M2

## 2018-12-11 VITALS
DIASTOLIC BLOOD PRESSURE: 60 MMHG | WEIGHT: 222 LBS | HEART RATE: 65 BPM | BODY MASS INDEX: 41.91 KG/M2 | SYSTOLIC BLOOD PRESSURE: 118 MMHG | RESPIRATION RATE: 16 BRPM | HEIGHT: 61 IN

## 2018-12-11 DIAGNOSIS — N18.30 CKD (CHRONIC KIDNEY DISEASE), STAGE III (HCC): ICD-10-CM

## 2018-12-11 DIAGNOSIS — R07.9 CHEST PAIN IN ADULT: ICD-10-CM

## 2018-12-11 DIAGNOSIS — M79.672 PAIN IN BOTH FEET: Primary | ICD-10-CM

## 2018-12-11 DIAGNOSIS — R60.0 BILATERAL LEG EDEMA: ICD-10-CM

## 2018-12-11 DIAGNOSIS — M79.671 PAIN IN BOTH FEET: Primary | ICD-10-CM

## 2018-12-11 DIAGNOSIS — I70.209 ATHEROSCLEROSIS OF ARTERIES OF EXTREMITIES (HCC): ICD-10-CM

## 2018-12-11 DIAGNOSIS — R06.00 EXERTIONAL DYSPNEA: Primary | ICD-10-CM

## 2018-12-11 DIAGNOSIS — L84 CORNS: ICD-10-CM

## 2018-12-11 LAB
ANION GAP SERPL CALCULATED.3IONS-SCNC: 8 MMOL/L (ref 4–13)
BUN SERPL-MCNC: 51 MG/DL (ref 5–25)
C3 SERPL-MCNC: 144 MG/DL (ref 90–180)
C4 SERPL-MCNC: 34 MG/DL (ref 10–40)
CALCIUM SERPL-MCNC: 9.1 MG/DL (ref 8.3–10.1)
CHLORIDE SERPL-SCNC: 105 MMOL/L (ref 100–108)
CO2 SERPL-SCNC: 28 MMOL/L (ref 21–32)
CREAT SERPL-MCNC: 1.75 MG/DL (ref 0.6–1.3)
GFR SERPL CREATININE-BSD FRML MDRD: 27 ML/MIN/1.73SQ M
GLUCOSE P FAST SERPL-MCNC: 98 MG/DL (ref 65–99)
POTASSIUM SERPL-SCNC: 4.1 MMOL/L (ref 3.5–5.3)
SODIUM SERPL-SCNC: 141 MMOL/L (ref 136–145)

## 2018-12-11 PROCEDURE — 36415 COLL VENOUS BLD VENIPUNCTURE: CPT

## 2018-12-11 PROCEDURE — 84166 PROTEIN E-PHORESIS/URINE/CSF: CPT | Performed by: PATHOLOGY

## 2018-12-11 PROCEDURE — 84165 PROTEIN E-PHORESIS SERUM: CPT | Performed by: PATHOLOGY

## 2018-12-11 PROCEDURE — 99214 OFFICE O/P EST MOD 30 MIN: CPT | Performed by: INTERNAL MEDICINE

## 2018-12-11 PROCEDURE — 80048 BASIC METABOLIC PNL TOTAL CA: CPT

## 2018-12-11 PROCEDURE — 86160 COMPLEMENT ANTIGEN: CPT

## 2018-12-11 PROCEDURE — 84166 PROTEIN E-PHORESIS/URINE/CSF: CPT

## 2018-12-11 PROCEDURE — 11056 PARNG/CUTG B9 HYPRKR LES 2-4: CPT | Performed by: PODIATRIST

## 2018-12-11 PROCEDURE — 84165 PROTEIN E-PHORESIS SERUM: CPT

## 2018-12-11 PROCEDURE — 83883 ASSAY NEPHELOMETRY NOT SPEC: CPT

## 2018-12-11 NOTE — PROGRESS NOTES
Cardiology Follow Up  Jackelin Earl [facility]  1940  [phone]  [facility] AND [facility] [address]ESSION[address]ZA  [facility]  [address] [address] [id number]    1  Exertional dyspnea  NM myocardial perfusion spect (rx stress and/or rest)   2  Bilateral leg edema  NM myocardial perfusion spect (rx stress and/or rest)   3  Chest pain in adult  NM myocardial perfusion spect (rx stress and/or rest)      Discussion/Plan:  Acute on chronic diastolic heart failure with a component of lymphedema- improved volume status  Lower ext better  - Weight loss  - Compression socks  - Continue Bumex 1mg morning + half tablet in evening + propranolol  - Low-salt diet  - Elevation of legs + walking    Incomplete rbbb    Acute on chronic kidney disease follow-up with renal  - continue amlodipine + olmesartan  - Bumex 1mg daily    Triglycerides- controlled  - 4000 mg omega-3 daily      6 months  Interval History:  She denies having chest pain  Her edema is better  Taking omega 3 currently  No bleeding or bruising  No dizziness or light-headness  Blood pressure very well controlled  Has back pain  12/11/2018:  She reports having some exertional shortness of breath  Her lower extremity edema has improved but is persistent  She denies feeling dizziness or lightheadedness  She denies having any falls  She denies having any bleeding or bruising      Patient Active Problem List   Diagnosis    Morbid obesity due to excess calories (HCC)    Benign essential hypertension    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)    Hypothyroidism    Acute on chronic diastolic congestive heart failure (HCC)    Arthropathy of knee    Hallux abductovalgus with bunions, unspecified laterality    Atherosclerosis of arteries of extremities (HCC)    Chronic low back pain    Chronic venous insufficiency    CKD (chronic kidney disease), stage III ([address] )    Difficulty in walking    Diverticulitis of colon    Elevated triglycerides with high cholesterol    Gout    Hiatal hernia    Hyperlipemia    Hyperuricemia    Knee pain    Lumbar disc herniation with radiculopathy    Morbid obesity with body mass index of 40 0-44 9 in adult (HCC)    Nephrolithiasis    Onychomycosis    Obstructive sleep apnea    Umbilical hernia    Tarsal tunnel syndrome    Rupture of popliteal cyst    Pseudogout of left wrist    Plantar fascial fibromatosis    Alkaline phosphatase elevation    Pain in both feet    Gastroesophageal reflux disease without esophagitis    Iron deficiency anemia secondary to inadequate dietary iron intake    Radiculopathy of lumbar region    Corns    SOB (shortness of breath)    Eczema     Past Medical History:   Diagnosis Date    Arthritis     joints    Cataract     Disease of thyroid gland     Eczema     GERD (gastroesophageal reflux disease)     Gout     Heart failure (HCC)     Hepatitis     Hiatal hernia     Hypertension     Onychomycosis     last assessed: 4/29/16    Orthopnea     resolved: 1/8/16    Sleep apnea     wears CPAP     Social History     Social History    Marital status: /Civil Union     Spouse name: N/A    Number of children: N/A    Years of education: N/A     Occupational History    Not on file       Social History Main Topics    Smoking status: Never Smoker    Smokeless tobacco: Never Used    Alcohol use No    Drug use: No    Sexual activity: Not on file     Other Topics Concern    Not on file     Social History Narrative    Daily coffee consumption    Lack of exercise    Sleeps 6-7 hours a day          Family History   Problem Relation Age of Onset    Diabetes Mother     Coronary artery disease Mother     Arthritis Mother     Hypertension Mother     Hypertension Father     Diabetes Brother     Diabetes Son     Arthritis Family      Past Surgical History:   Procedure Laterality Date    ABDOMINAL SURGERY          BREAST SURGERY Right     cyst removal    CARPAL TUNNEL RELEASE Left     CARPAL TUNNEL RELEASE Right     CATARACT EXTRACTION       SECTION  1960    x1    COLONOSCOPY N/A 2018    Procedure: COLONOSCOPY;  Surgeon: Adia Salazar MD;  Location: [place] [address] GI LAB; Service: Gastroenterology    DILATION AND CURETTAGE OF UTERUS  1967    EYE SURGERY Left 2006    cataracts    HERNIA REPAIR      umbilical hernia    HYSTERECTOMY      INCISIONAL HERNIA REPAIR      incarcerated    KNEE ARTHROSCOPY Right     TX REMV CATARACT EXTRACAP,INSERT LENS Right 3/27/2017    Procedure: EXTRACTION EXTRACAPSULAR CATARACT PHACO INTRAOCULAR LENS (IOL);   Surgeon: Gideon Cruz MD;  Location: [facility] MAIN OR;  Service: Ophthalmology       Current Outpatient Prescriptions:     allopurinol (ZYLOPRIM) 300 mg tablet, Take 1 tablet (300 mg total) by mouth every morning, Disp: 90 tablet, Rfl: 1    amlodipine-olmesartan (BLACK) 10-40 MG, TAKE 1 TABLET ONE TIME DAILY (Patient taking differently: pt takes 5-20mg ,1 tab daily ), Disp: 90 tablet, Rfl: 3    aspirin 81 MG tablet, Take 81 mg by mouth every morning  , Disp: , Rfl:     bumetanide (BUMEX) 2 mg tablet, Take 0 5 tablets (1 mg total) by mouth 2 (two) times a day, Disp: 90 tablet, Rfl: 0    cholecalciferol (VITAMIN D3) 1,000 units tablet, Take 1 tablet by mouth daily, Disp: , Rfl:     clobetasol (TEMOVATE) 0 05 % ointment, Apply topically 2 (two) times a day, Disp: 30 g, Rfl: 1    colchicine (COLCRYS) 0 6 mg tablet, Take 0 6 mg by mouth daily as needed Only in gout flare   , Disp: , Rfl:     Crisaborole (EUCRISA) 2 % OINT, Apply twice daily, Disp: 60 g, Rfl: 0    diclofenac sodium (VOLTAREN) 1 %, Apply 2 g topically 4 (four) times a day, Disp: 100 g, Rfl: 0    gabapentin (NEURONTIN) 300 mg capsule, Take 1 capsule (300 mg total) by mouth daily at bedtime for 90 days, Disp: 90 capsule, Rfl: 0    hydrocortisone 2 5 % cream, Apply topically 2 (two) times a day, Disp: 30 g, Rfl: 2    levothyroxine 112 mcg tablet, Take 1 tablet (112 mcg total) by mouth daily, Disp: 90 tablet, Rfl: 1    Omega-3-acid Ethyl Esters & D3 1 & 1000 GM & UNIT KIT, Take 2 capsules by mouth 2 (two) times a day, Disp: , Rfl:     omeprazole (PriLOSEC) 20 mg delayed release capsule, Take 1 capsule (20 mg total) by mouth every morning, Disp: 90 capsule, Rfl: 1    propranolol (INDERAL) 80 mg tablet, TAKE 1/2 TABLET EVERY 12 HOURS DAILY, Disp: 90 tablet, Rfl: 3    spironolactone (ALDACTONE) 25 mg tablet, Take 1 tablet (25 mg total) by mouth daily, Disp: 90 tablet, Rfl: 1  No Known Allergies    Review of Systems:  Review of Systems   Constitutional: Negative  Negative for activity change, appetite change, chills, diaphoresis, fatigue, fever and unexpected weight change  HENT: Negative  Negative for congestion, dental problem, drooling, ear discharge, ear pain, facial swelling, hearing loss, mouth sores, nosebleeds, postnasal drip, rhinorrhea, sinus pain, sinus pressure, sneezing, sore throat, tinnitus, trouble swallowing and voice change  Eyes: Negative  Negative for photophobia, pain, redness, itching and visual disturbance  Respiratory: Negative  Negative for apnea, cough, choking, chest tightness, shortness of breath, wheezing and stridor  Cardiovascular: Positive for leg swelling  Negative for chest pain and palpitations  Gastrointestinal: Negative  Negative for abdominal distention, abdominal pain, anal bleeding, blood in stool, constipation, diarrhea, nausea, rectal pain and vomiting  Endocrine: Negative  Negative for cold intolerance, heat intolerance, polydipsia, polyphagia and polyuria  Genitourinary: Negative  Negative for decreased urine volume, difficulty urinating, dyspareunia, dysuria, enuresis, flank pain, frequency, genital sores, hematuria, menstrual problem, pelvic pain, urgency, vaginal bleeding, vaginal discharge and vaginal pain  Musculoskeletal: Positive for back pain  Negative for arthralgias, gait problem, joint swelling, myalgias, neck pain and neck stiffness  Skin: Negative  Negative for color change, pallor, rash and wound  Allergic/Immunologic: Negative  Negative for environmental allergies, food allergies and immunocompromised state  Neurological: Negative  Negative for dizziness, tremors, seizures, syncope, facial asymmetry, speech difficulty, weakness, light-headedness, numbness and headaches  Hematological: Negative  Negative for adenopathy  Does not bruise/bleed easily  Psychiatric/Behavioral: Negative  Negative for agitation, behavioral problems, confusion, decreased concentration, dysphoric mood, hallucinations, self-injury, sleep disturbance and suicidal ideas  The patient is not nervous/anxious and is not hyperactive  All other systems reviewed and are negative  Vitals:    12/11/18 1059   BP: 146/56   BP Location: Left arm   Patient Position: Sitting   Cuff Size: Standard   Pulse: (!) 54   SpO2: 97%   Weight: 101 kg (222 lb)   Height: 5' 1" (1 549 m)     Physical Exam:  Physical Exam   Constitutional: She is oriented to person, place, and time  No distress  HENT:   Head: Normocephalic and atraumatic  Right Ear: External ear normal    Left Ear: External ear normal    Eyes: Pupils are equal, round, and reactive to light  Conjunctivae are normal  Right eye exhibits no discharge  Left eye exhibits no discharge  No scleral icterus  Neck: Normal range of motion  Neck supple  No JVD present  No tracheal deviation present  No thyromegaly present  Cardiovascular: Normal rate and regular rhythm  Exam reveals gallop  Exam reveals no friction rub  No murmur heard  Pulmonary/Chest: Effort normal and breath sounds normal  No stridor  No respiratory distress  She has no wheezes  She has no rales  She exhibits no tenderness  Abdominal: Soft   Bowel sounds are normal  She exhibits no distension and no mass  There is no tenderness  There is no rebound and no guarding  Musculoskeletal: Normal range of motion  She exhibits edema  She exhibits no tenderness or deformity  Neurological: She is alert and oriented to person, place, and time  She has normal reflexes  No cranial nerve deficit  She exhibits normal muscle tone  Coordination normal    Skin: Skin is warm and dry  No rash noted  She is not diaphoretic  No erythema  No pallor  Psychiatric: She has a normal mood and affect  Her behavior is normal  Judgment and thought content normal    Nursing note and vitals reviewed  Labs:     Lab Results   Component Value Date    WBC 6 74 10/26/2018    HGB 11 4 (L) 10/26/2018    HCT 36 1 10/26/2018    MCV 92 10/26/2018     10/26/2018     Lab Results   Component Value Date    K 4 4 11/09/2018     11/09/2018    CO2 26 11/09/2018    BUN 81 (H) 11/09/2018    CREATININE 1 76 (H) 11/09/2018    GLUF 105 (H) 11/09/2018    CALCIUM 9 4 11/09/2018    AST 16 10/26/2018    ALT 21 10/26/2018    ALKPHOS 132 (H) 10/26/2018    EGFR 27 11/09/2018     Lab Results   Component Value Date    CHOL 191 12/27/2013    CHOL 218 09/21/2013     Lab Results   Component Value Date    HDL 45 10/26/2018    HDL 46 05/31/2018    HDL 41 02/19/2018     Lab Results   Component Value Date    LDLCALC 126 (H) 10/26/2018    LDLCALC 105 (H) 05/31/2018    LDLCALC 90 02/19/2018     Lab Results   Component Value Date    TRIG 159 (H) 10/26/2018    TRIG 146 05/31/2018    TRIG 140 02/19/2018     Lab Results   Component Value Date    HGBA1C 5 6 11/02/2017       Imaging & Testing   I have personally reviewed pertinent reports  EKG: Personally reviewed      Normal sinus rhythm no acute st/t wave    Cardiac testing:   Results for orders placed during the hospital encounter of 01/15/16   Echo complete with contrast if indicated    Narrative [address]  [address] [facility]  [address]  [phone]    Transthoracic Echocardiogram  2D, M-mode, Doppler, and Color Doppler    Study date:  15-Berny-2016    Patient: Minnie Rose  MR number: [id number]  Account number: [de-identified]  : 1940  Age: 67 years  Gender: Female  Status: Routine  Location: Echo lab  Height: 61 in  Weight: 214 5 lb  BP: 132/ 84 mmHg    Indications: HTN    Diagnoses: 401 9 - HYPERTENSION NOS    Sonographer:  Edgardo Pedraza  Primary Physician:  Neel Bains  Referring Physician:  Christy Poplar:  Caitie Ford  Interpreting Physician:  DO AFIA Carroll    LEFT VENTRICLE:  Systolic function was at the lower limits of normal  Ejection fraction was  estimated in the range of 50 % to 55 %  There were no regional wall motion abnormalities  There was moderate concentric hypertrophy  Features were consistent with a pseudonormal left ventricular filling pattern,  with concomitant abnormal relaxation and increased filling pressure (grade 2  diastolic dysfunction)  Doppler parameters were consistent with elevated mean  left atrial filling pressure  LEFT ATRIUM:  The atrium was moderately dilated  RIGHT ATRIUM:  The atrium was markedly dilated  MITRAL VALVE:  There was marked annular calcification  There was moderate regurgitation  TRICUSPID VALVE:  There was mild regurgitation  Pulmonary artery systolic pressure was mildly increased  Estimated peak PA pressure was 46 mmHg  HISTORY: PRIOR HISTORY: Patient has no history of cardiovascular disease  PROCEDURE: The procedure was performed in the echo lab  This was a routine  study  The transthoracic approach was used  The study included complete 2D  imaging, M-mode, complete spectral Doppler, and color Doppler  The heart rate  was 77 bpm, at the start of the study  Echocardiographic views were limited due  to poor acoustic window availability and decreased penetration  This was a  technically difficult study      LEFT VENTRICLE: Size was normal  Systolic function was at the lower limits of  normal  Ejection fraction was estimated in the range of 50 % to 55 %  There  were no regional wall motion abnormalities  There was moderate concentric  hypertrophy  DOPPLER: Features were consistent with a pseudonormal left  ventricular filling pattern, with concomitant abnormal relaxation and increased  filling pressure (grade 2 diastolic dysfunction)  Doppler parameters were  consistent with elevated mean left atrial filling pressure  RIGHT VENTRICLE: The size was normal  Systolic function was normal  DOPPLER:  Systolic pressure was within the normal range  LEFT ATRIUM: The atrium was moderately dilated  No thrombus was identified  RIGHT ATRIUM: The atrium was markedly dilated  MITRAL VALVE: There was marked annular calcification  Valve structure was  normal  There was normal leaflet separation  No echocardiographic evidence for  prolapse  DOPPLER: The transmitral velocity was within the normal range  There  was no evidence for stenosis  There was moderate regurgitation  AORTIC VALVE: The valve was trileaflet  Leaflets exhibited normal thickness,  normal cuspal separation, and sclerosis  DOPPLER: Transaortic velocity was  within the normal range  There was no evidence for stenosis  There was no  regurgitation  TRICUSPID VALVE: The valve structure was normal  There was normal leaflet  separation  DOPPLER: The transtricuspid velocity was within the normal range  There was mild regurgitation  Pulmonary artery systolic pressure was mildly  increased  Estimated peak PA pressure was 46 mmHg  PULMONIC VALVE: Leaflets exhibited normal thickness, no calcification, and  normal cuspal separation  DOPPLER: The transpulmonic velocity was within the  normal range  There was mild regurgitation  PERICARDIUM: There was no thickening  There was no pericardial effusion  AORTA: The root exhibited normal size      PULMONARY ARTERY: The size was normal  The morphology appeared normal     SYSTEM MEASUREMENT TABLES    2D mode  AoR Diam 2D: 2 8 cm  LA Diam (2D): 5 3 cm  LA/Ao (2D): 1 89  FS (2D Teich): 25 3 %  IVSd (2D): 1 44 cm  LVDEV: 98 3 cm³  LVESV: 49 1 cm³  LVIDd(2D): 4 62 cm  LVISd (2D): 3 45 cm  LVPWd (2D): 1 3 cm  SV (Teich): 49 2 cm³    Apical four chamber  LVEF A4C: 55 %    Unspecified Scan Mode  MV Peak A Jesse: 1110 mm/s  MV Peak E Jesse  Mean: 1400 mm/s  MVA (PHT): 3 55 cm squared  PHT: 58 ms  Max P mm[Hg]  V Max: 2990 mm/s  Vmax: 3050 mm/s  RA Area: 19 6 cm squared  RA Volume: 55 3 cm³  TAPSE: 1 9 cm    [facility]    Prepared and electronically signed by    Abigail Cramer DO  Signed 2016 17:37:47       No results found for this or any previous visit  Chata Alaniz  Please call with any questions or suggestions    A description of the counseling:   Goals and Barriers:  Patient's ability to self care:  Medication side effect reviewed with patient in detail and all their questions answered  "This note has been constructed using a voice recognition system  Therefore there may be syntax, spelling, and/or grammatical errors   Please call if you have any questions  "

## 2018-12-11 NOTE — PROGRESS NOTES
Procedures   Foot Exam          History of Present Illness   HPI: Patient complains of pain in her feet with ambulation  She is pain around the big toe joints  There is no history of trauma  Today the patient is concerned with her swollen legs  She does not have pain in her legs, however, they are itchy and red  She suffers from edema  She does take water pill  Patient is now been diagnosed with renal compromise       Review of Systems        Constitutional: No fever, no chills, feels well, no tiredness, no recent weight gain or loss       Eyes: No complaints of eyesight problems, no red eyes       ENT: no loss of hearing, no nosebleeds, no sore throat       Cardiovascular: No complaints of chest pain, no palpitations, no leg claudication or lower extremity edema       Respiratory: no compliants of shortness of breath, no wheezing, no cough       Gastrointestinal: no complaints of abdominal pain, no constipation, no nausea or diarrhea, no vomiting, no bloody stools       Genitourinary: no complaints of dysuria, no incontinence       Musculoskeletal: arthralgias,-- limb pain-- and-- myalgias, but-- as noted in HPI       Integumentary: no complaints of skin rash or lesion, no itching or dry skin, no skin wounds       Neurological: numbness-- and-- tingling, but-- no complaints of headache, no confusion, no numbness or tingling, no dizziness-- and-- as noted in HPI       Endocrine: No complaints of muscle weakness, no feelings of weakness, no frequent urination, no excessive thirst-- and-- as noted in HPI   feelings of weakness      Psychiatric: No suicidal thoughts, no anxiety, no feelings of depression       Active Problems   1  Abdominal pain (789 00) (R10 9)   2  Acute bronchitis (466 0) (J20 9)   3  Acute on chronic diastolic congestive heart failure (428 33,428 0) (I50 33)   4  Ankle pain, unspecified laterality   5  Arthropathy of knee (716 96) (M17 10)   6  Atherosclerosis of arteries of extremities (440 20) (I70 209)   7  Benign essential hypertension (401 1) (I10)   8  Breast pain (611 71) (N64 4)   9  Bunion, unspecified laterality   10  Callus (700) (L84)   11  Cellulitis (682 9) (L03 90)   12  Chronic low back pain (724 2,338 29) (M54 5,G89 29)   13  Chronic reflux esophagitis (530 11) (K21 0)   14  Chronic venous insufficiency (459 81) (I87 2)   15  CKD (chronic kidney disease), stage III (585 3) (N18 3)   68  UGUVYXYP systolic and diastolic HF (heart failure) (428 40) (I50 40)   17  Dehydration (276 51) (E86 0)   18  Dermatitis (692 9) (L30 9)   19  Difficulty in walking (719 7) (R26 2)   20  Discoloration of nail (703 8) (L60 8)   21  Diverticulitis of colon (562 11) (K57 32)   22  Dizziness (780 4) (R42)   23  Dyspnea on exertion (786 09) (R06 09)   24  Dystrophic nail (703 8) (L60 3)   25  Dysuria (788 1) (R30 0)   26  Edema (782 3) (R60 9)   27  Electrolyte or fluid disorder (276 9) (E87 8)   28  Elevated triglycerides with high cholesterol (272 2) (E78 2)   29  Encounter for screening for cardiovascular disorders (V81 2) (Z13 6)   30  Encounter for screening mammogram for breast cancer (V76 12) (Z12 31)   31  Esophageal reflux (530 81) (K21 9)   32  Flu vaccine need (V04 81) (Z23)   33  Flu vaccine need (V04 81) (Z23)   34  Gout (274 9) (M10 9)   35  Headache (784 0) (R51)   36  Hiatal hernia (553 3) (K44 9)   37  Hyperlipemia (272 4) (E78 5)   38  Hypertension (401 9) (I10)   39  Hyperuricemia (790 6) (E79 0)   40  Hypothyroidism (244 9) (E03 9)   41  Infectious diarrhea (009 2) (A09)   42  Knee pain (719 46) (M25 569)   43  Leg swelling (729 81) (M79 89)   44  Localized osteoarthritis of knees, bilateral (715 36) (M17 0)   45  Lumbar canal stenosis (724 02) (M48 061)   46  Lumbar disc herniation with radiculopathy (722 10) (M51 16)   47  Lumbar radiculopathy (724 4) (M54 16)   48  Lumbar stenosis with neurogenic claudication (724 03) (M48 062)   49  Lymphedema (457 1) (I89 0)   50  Medicare annual wellness visit, initial (V70 0) (Z00 00)   51  Medicare annual wellness visit, subsequent (V70 0) (Z00 00)   52  Morbid obesity with body mass index of 40 0-44 9 in adult (278 01,V85 41)      (E66 01,Z68 41)   53  Nails Onychauxis (703 8)   54  Nausea (787 02) (R11 0)   55  Need for pneumococcal vaccination (V03 82) (Z23)   56  Need for prophylactic vaccination and inoculation against influenza (V04 81) (Z23)   57  Need for shingles vaccine (V04 89) (Z23)   58  Nephrolithiasis (592 0) (N20 0)   59  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)   60  Obstructive sleep apnea (327 23) (G47 33)   61  Onychomycosis (110 1) (B35 1)   62  Orthopnea (786 02) (R06 01)   63  Osteoarthritis, localized, knee (715 36) (M17 10)   64  Osteoporosis screening (V82 81) (Z13 820)   65  Other specified anxiety disorders (300 09) (F41 8)   66  Other specified inflammatory spondylopathies, 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) (M46 80)   67  Overweight (278 02) (E66 3)   68  Pain in both feet (729 5) (M79 671,M79 672)   69  Pain in wrist joint (719 43) (M25 539)   70  Palpitations (785 1) (R00 2)   71  Pes planus, unspecified laterality (734) (M21 40)   72  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)   73  Pseudogout of left wrist (925 03,053 92) (M11 232)   74  Renal disorder (593 9) (N28 9)   75  Rupture of popliteal cyst (727 51) (M66 0)   76  Screening for colon cancer (V76 51) (Z12 11)   77  Screening for diabetes mellitus (DM) (V77 1) (Z13 1)   78  Screening for genitourinary condition (V81 6) (Z13 89)   79  Screening for malignant neoplasm of breast (V76 10) (Z12 31)   80  Short unilateral neuralgiform headache, conjunctival injection/tearing (339 05) (G44 059)   81  Somnolence, daytime (780 54) (R40 0)   82  Spinal stenosis of lumbar region (724 02) (M48 061)   83  Tarsal tunnel syndrome (355 5) (G57 50)   84  Tenderness in limb (729 5) (M79 609)   85  Tinea pedis (110 4) (H79 8)   86  Umbilical hernia (341 1) (K42 9)   87  Visit for screening mammogram (V76 12) (Z12 31)     Past Medical History    · Benign essential hypertension (401 1) (I10)   · Depression screen (V79 0) (Z13 89)   · Hiatal hernia (553 3) (K44 9)   · History of abdominal pain (V13 89) (Q77 389)   · History of abdominal pain (V13 89) (G55 128)   · History of arthritis (V13 4) (Z87 39)   · History of backache (V13 59) (Z87 39)   · History of cataract (V12 49) (Z86 69)   · History of eczema (V13 3) (Z87 2)   · History of heart failure (V12 59) (Z86 79)   · History of hepatitis (V12 09) (Z86 19)   · History of onychomycosis (V12 09) (Z86 19)   · History of sleep apnea (V13 89) (Z86 69)   · History of Orthopnea (786 02) (R06 01)     The active problems and past medical history were reviewed and updated today       Surgical History    · History of Complete Colonoscopy   · History of Hysterectomy   · History of Incisional Hernia Repair - Incarcerated   · History of Knee Surgery     The surgical history was reviewed and updated today        Family History   Mother    · Family history of Coronary artery disease   · Family history of arthritis (V17 7) (Z82 61)   · Denied: Family history of depression   · Family history of diabetes mellitus (V18 0) (Z83 3)   · Family history of hypertension (V17 49) (Z82 49)   · Denied: Family history of substance abuse  Father    · Denied: Family history of depression   · Family history of hypertension (V17 49) (Z82 49)   · Denied: Family history of substance abuse  Sibling    · Denied: Family history of depression   · Denied: Family history of substance abuse  Family History    · Family history of arthritis (V17 7) (Z82 61)     The family history was reviewed and updated today        Social History    · Denied: History of Alcohol Use (History)   · Daily Coffee Consumption (___ Cups/Day)   · Lack of exercise (V69 0) (Z72 3)   ·    · Never a smoker   · Never a smoker   · Sleeps 6 -7 hours a day  The social history was reviewed and updated today   The social history was reviewed and is unchanged       Current Meds    1  Allopurinol 300 MG Oral Tablet; take one tablet by mouth every day;     Therapy: 94KVL8933 to (Evaluate:88Drz5539)  Requested for: 40HKZ6591; Last     Rx:[id number] Ordered   2  Amlodipine-Olmesartan 5-20 MG Oral Tablet; TAKE 1 TABLET ONCE DAILY;     Therapy: 49QNB8350 to (Lorraine Concepcion)  Requested for: 00Tha7858; Last     Rx:55Uoy7862 Ordered   3  Aspirin 81 MG Oral Tablet Chewable;     Therapy: (Recorded:96Ush0839) to Recorded   4  Bumetanide 2 MG Oral Tablet; TAKE 1 TABLET DAILY;     Therapy: 08JTH0017 to (Evaluate:68Qpb0903)  Requested for: 77ZYA9136; Last     Rx:78Arm4255 Ordered   5  Colchicine 0 6 MG Oral Tablet; TAKE 1 TABLET DAILY AS DIRECTED;     Therapy: 64KZI6515 to (Evaluate:88Bek4732)  Requested for: 99DGL3866; Last     Rx:77Gic6702 Ordered   6  Gabapentin 300 MG Oral Capsule; TAKE 1 CAPSULE AT BEDTIME;     Therapy: 08ETC5063 to (Evaluate:43Vxc0488)  Requested for: 91WVB1333; Last     Rx:56Vez5779 Ordered   7  Levothyroxine Sodium 100 MCG Oral Tablet; 1 TAb PO Daily in AM;     Therapy: 98QCP9607 to (Last Rx:99Kfx9154)  Requested for: 67GNP0020 Ordered   8  Omega-3-acid Ethyl Esters 1 GM Oral Capsule; take 2 capsules by mouth twice a day;     Therapy: 60LFQ1959 to (Evaluate:59Kxj3482)  Requested for: 84Imi4731; Last     Rx:75Ouu9817 Ordered   9  Omeprazole 20 MG Oral Capsule Delayed Release; TAKE 1 CAPSULE EVERY DAY AS     DIRECTED  BY  PHYSICIAN;     Therapy: 56CJY4967 to (Evaluate:84Kld0450)  Requested for: 94UQA8279; Last     Rx:60Gki9394 Ordered   10  Propranolol HCl - 80 MG Oral Tablet; TAKE 1/2 TABLET EVERY 12 HOURS DAILY;      Therapy: 78HGN1385 to (Last Rx:74Ntr9912)  Requested for: 29Msi3131 Ordered   11  Spironolactone 25 MG Oral Tablet; TAKE 1 TABLET DAILY;      Therapy: 91QPL4892 to Recorded   12  Vitamin D3 TABS; Take 1 tablet daily;      Therapy: (Recorded:02Jun2017) to Recorded   13  Zoster (Zostavax);  INJECT 0 5  ML Subcutaneous;    Therapy: 35ADF4571 to (Last Rx:59Ubs0313) Ordered     The medication list was reviewed and updated today       Allergies   1  No Known Drug Allergies     Physical Exam   Left Foot: Appearance: Normal except as noted: excessive pronation-- and-- pes planus     Right Foot: Appearance: Normal except as noted: excessive pronation-- and-- pes planus  Tenderness: None except the calcaneous,-- medial calcaneous-- and-- insertion of the plantar fascia     Left Ankle: Appearance: Normal except ecchymosis-- and-- swelling laterally  ROM: limited ROM in all planes    Right Ankle: ROM: limited ROM in all planes Motor: diffuse weakness     Neurological Exam: performed  Light touch was intact bilaterally  Vibratory sensation was intact bilaterally  Response to monofilament test was intact bilaterally  Deep tendon reflexes: patellar reflex present bilaterally-- and-- achilles reflex present bilaterally     Vascular Exam: performed Dorsalis pedis pulses were 1/4 bilaterally  Posterior tibial pulses were 1/4 bilaterally  Elevation Pallor: diminished bilaterally  Capillary refill time was greater than 3 seconds bilaterally-- and-- Q9 findings bilateral  Negative digital hair noted  Positive abnormal cooling bilateral  Edema: moderate bilaterally-- and-- 6/7 pitting edema  Negative Homans sign     Toenails: All of the toenails were elongated,-- hypertrophied,-- discolored-- and-- Mycotic with onychogryphosis  Note is made of bilateral tinea pedis in moccasin foot  Distribution          Socks and shoes removed, Right Foot Findings: swollen, erythematous and dry       The sensory exam showed diminished vibratory sensation at the level of the toes  Diminished tactile sensation with monofilament testing throughout the right foot       Socks and shoes removed, Left Foot Findings: swollen, erythematous and dry       The sensory exam showed diminished vibratory sensation at the level of the toes   Diminished tactile sensation with monofilament testing throughout the left foot      Capillary refills findings on the right were delayed in the toes       Pulses:      1+ in the posterior tibialis on the right      1+ in the dorsalis pedis on the right       Capillary refills findings on the left were delayed in the toes       Pulses:      1+ in the posterior tibialis on the left      1+ in the dorsalis pedis on the left       Assign Risk Category: 2: Loss of protective sensation with or without weakness, deformity, callus, pre-ulcer, or history of ulceration  High risk     Hyperkeratosis: present on both first toes,-- present on both first sub metatarsals-- and-- Bilateral plantar moccasin tinea pedis noted     Shoe Gear Evaluation: performed ()  Recommendation(s): SAS style       Procedure   All mycotic nails debrided today  Bilateral pre-ulcerative lesions debrided today   Procedures performed without pain or complication

## 2018-12-12 LAB
ALBUMIN SERPL ELPH-MCNC: 4.07 G/DL (ref 3.5–5)
ALBUMIN SERPL ELPH-MCNC: 58.1 % (ref 52–65)
ALBUMIN UR ELPH-MCNC: 100 %
ALPHA1 GLOB MFR UR ELPH: 0 %
ALPHA1 GLOB SERPL ELPH-MCNC: 0.35 G/DL (ref 0.1–0.4)
ALPHA1 GLOB SERPL ELPH-MCNC: 5 % (ref 2.5–5)
ALPHA2 GLOB MFR UR ELPH: 0 %
ALPHA2 GLOB SERPL ELPH-MCNC: 0.92 G/DL (ref 0.4–1.2)
ALPHA2 GLOB SERPL ELPH-MCNC: 13.1 % (ref 7–13)
B-GLOBULIN MFR UR ELPH: 0 %
BETA GLOB ABNORMAL SERPL ELPH-MCNC: 0.43 G/DL (ref 0.4–0.8)
BETA1 GLOB SERPL ELPH-MCNC: 6.1 % (ref 5–13)
BETA2 GLOB SERPL ELPH-MCNC: 5 % (ref 2–8)
BETA2+GAMMA GLOB SERPL ELPH-MCNC: 0.35 G/DL (ref 0.2–0.5)
GAMMA GLOB ABNORMAL SERPL ELPH-MCNC: 0.89 G/DL (ref 0.5–1.6)
GAMMA GLOB MFR UR ELPH: 0 %
GAMMA GLOB SERPL ELPH-MCNC: 12.7 % (ref 12–22)
IGG/ALB SER: 1.39 {RATIO} (ref 1.1–1.8)
KAPPA LC FREE SER-MCNC: 23.1 MG/L (ref 3.3–19.4)
KAPPA LC FREE/LAMBDA FREE SER: 1.47 {RATIO} (ref 0.26–1.65)
LAMBDA LC FREE SERPL-MCNC: 15.7 MG/L (ref 5.7–26.3)
PROT PATTERN SERPL ELPH-IMP: ABNORMAL
PROT PATTERN UR ELPH-IMP: NORMAL
PROT SERPL-MCNC: 7 G/DL (ref 6.4–8.2)
PROT UR-MCNC: 15 MG/DL

## 2018-12-12 NOTE — PROGRESS NOTES
Most recent creatinine is stable  Has not returned to prior baseline though  Complements unrevealing  Rest of lab work still pending

## 2018-12-13 ENCOUNTER — OFFICE VISIT (OUTPATIENT)
Dept: NEPHROLOGY | Facility: CLINIC | Age: 78
End: 2018-12-13
Payer: MEDICARE

## 2018-12-13 VITALS
DIASTOLIC BLOOD PRESSURE: 70 MMHG | SYSTOLIC BLOOD PRESSURE: 128 MMHG | WEIGHT: 224 LBS | BODY MASS INDEX: 42.29 KG/M2 | HEIGHT: 61 IN

## 2018-12-13 DIAGNOSIS — N17.9 AKI (ACUTE KIDNEY INJURY) (HCC): ICD-10-CM

## 2018-12-13 DIAGNOSIS — N18.30 CKD (CHRONIC KIDNEY DISEASE), STAGE III (HCC): Primary | ICD-10-CM

## 2018-12-13 PROCEDURE — 99213 OFFICE O/P EST LOW 20 MIN: CPT | Performed by: INTERNAL MEDICINE

## 2018-12-13 NOTE — PROGRESS NOTES
NEPHROLOGY OFFICE VISIT   Edvin Rodriguez 77 y o  female MRN: [id number]  12/13/2018    Reason for Visit: CKD III, SHABANA    ASSESSMENT and PLAN:    I had the pleasure of seeing Ms Adela Leary today in the renal clinic for the continued management of CKD III, SHABANA  Since our last visit, there has been no ER visits or hospitalizations  He currently has no complaints at this time and is feeling well  Patient denies any chest pain, shortness of breath and swelling  The last blood work was done on december, which we have reviewed together  58-year-old female with a past medical history of chronic kidney disease, venous stasis, HTN, hypothyroidism, lumbar canal stenosis, Anxiety, GERD, neuropathy, Gout, EF 69-81%, Grade 2 diastolic dysfunction who presents for follow up for CKD  To note, patient's  has now passed away in August      1) CKD - Prior year had nl Cr prior to aug 2016 and then had SHABANA during diarrhea episode  To note, patient also has a long standing history of HTN  Creatinine in 2013, 0 9 mg/dL  Cr early 2016 was 0 80-0 9 mg/dL; then increased starting in august 2016 to 2 3 mg/dL and has fluctuated since  CT scan in 2016, kidneys appearing normal at that time  UA in august was bland       Etiology of CKD may be long standing HTN and ATN  Baseline Cr ~ 1 1 -1 3 mg/dL, but most recently in October the creatinine was rising to 1 7 mg/dL  Patient at that time had signs of volume overload      - Urine eos 0%;   - A1c 5 6% in nov  - -20 WBC  Patient asymptomatic   -U PCR stable  0 17   -SPEP unrevealing  -UPEP unrevealing  - C3, C4 unrevealing  - Renal u/s with R 10 3 cm, L 10 4 cm, renal cortex 1 cm b/l; both kidneys slightly reduced in size; lower pole of R kidney is non obstructing calculus  - Pt follows with Urology team for nephrolithiasis  - No NSAIDs, the patient is not currently taking NSAID  - continue Bumex 2 mg in AM and advised to take extra half dose in evening until weight is 220 lbs   Pt has 2+ LE edema today  -nuc stress test per Cardiology in Jan  - check renal u/s as serologies neg and prior hist of stone  -BMP next week     2) SOB - Volume - follows with Cardiology     -see above  - improved with increase bumex     3) HTN -      - continue amlodipine/olmesartan; bumex, spironolactone, Propranolol  -may need to hold amlodipine if blood pressures are less than 383 systolic     4) hypothyroid - as per Primary Care Physician     5) HPL - Primary Care following       6) Electrolytes - stable     7) MBD -Vit D 48 appropriate  Phos 3 7 -- appropriate       - PTH rising 129 - monitor for now     8) gout -      - on allopurinol - may need to reduce dose if Cr rises or GFR < 20  -patient only takes colchicine up to 3 doses max when has a gout flare     9) back pain - follows with Pain management     10) Colonoscopy in feb with internal hemorrhoids and polyp removed       11) alk phos elevation     - stable, but still elevated  - LFTs nl in may  - was started on allopurinol since last check when was normal  Could be related to allopurinol     12) Anemia - Hb stable     It was a pleasure evaluating your patient in the office today  Thank you for allowing our team to participate in the care of Ms Mirian Welch  Please do not hesitate to contact our team if further issues/questions shall arise in the interim  No problem-specific Assessment & Plan notes found for this encounter  HPI:    Pt denies complaints with exception of occ SOB with ambulation  Prior SOB has improved  PATIENT INSTRUCTIONS:    Patient Instructions   1) please complete renal ultrasound  2) labwork in 4-6 weeks  3) appointment in 2 months        OBJECTIVE:  Current Weight: Weight - Scale: 102 kg (224 lb)  Vitals:    12/13/18 1111   BP: 128/70   BP Location: Left arm   Patient Position: Sitting   Cuff Size: Standard   Weight: 102 kg (224 lb)   Height: 5' 1" (1 549 m)    Body mass index is 42 32 kg/m²        REVIEW OF SYSTEMS:    Review of Systems   Constitutional: Negative  Negative for fatigue  HENT: Negative  Eyes: Negative  Respiratory: Positive for shortness of breath  Cardiovascular: Negative  Negative for leg swelling  Gastrointestinal: Negative  Endocrine: Negative  Genitourinary: Negative  Negative for difficulty urinating  Musculoskeletal: Negative  Skin: Negative  Allergic/Immunologic: Negative  Neurological: Negative  Hematological: Negative  Psychiatric/Behavioral: Negative  All other systems reviewed and are negative  PHYSICAL EXAM:      Physical Exam   Constitutional: She is oriented to person, place, and time  She appears well-developed and well-nourished  No distress  HENT:   Head: Normocephalic and atraumatic  Mouth/Throat: No oropharyngeal exudate  Eyes: Conjunctivae are normal  Right eye exhibits no discharge  Left eye exhibits no discharge  No scleral icterus  Neck: Normal range of motion  Neck supple  No JVD present  Cardiovascular: Normal rate and regular rhythm  Exam reveals no gallop and no friction rub  No murmur heard  Pulmonary/Chest: Effort normal and breath sounds normal  No respiratory distress  She has no wheezes  She has no rales  Abdominal: Soft  Bowel sounds are normal  She exhibits no distension  There is no tenderness  There is no rebound  Musculoskeletal: Normal range of motion  She exhibits edema  She exhibits no tenderness or deformity  Neurological: She is alert and oriented to person, place, and time  Coordination normal    Skin: Skin is warm and dry  No rash noted  She is not diaphoretic  No erythema  No pallor  Psychiatric: She has a normal mood and affect  Her behavior is normal  Judgment and thought content normal    Nursing note and vitals reviewed        Medications:    Current Outpatient Prescriptions:     allopurinol (ZYLOPRIM) 300 mg tablet, Take 1 tablet (300 mg total) by mouth every morning, Disp: 90 tablet, Rfl: 1    amlodipine-olmesartan (BLACK) 10-40 MG, TAKE 1 TABLET ONE TIME DAILY (Patient taking differently: pt takes 5-20mg ,1 tab daily ), Disp: 90 tablet, Rfl: 3    aspirin 81 MG tablet, Take 81 mg by mouth every morning  , Disp: , Rfl:     bumetanide (BUMEX) 2 mg tablet, Take 0 5 tablets (1 mg total) by mouth 2 (two) times a day (Patient taking differently: Take 2 mg by mouth daily Take one tablet daily; take extra 1/2 pill in the afternoon as needed for swelling ), Disp: 90 tablet, Rfl: 0    cholecalciferol (VITAMIN D3) 1,000 units tablet, Take 1 tablet by mouth daily, Disp: , Rfl:     clobetasol (TEMOVATE) 0 05 % ointment, Apply topically 2 (two) times a day, Disp: 30 g, Rfl: 1    colchicine (COLCRYS) 0 6 mg tablet, Take 0 6 mg by mouth daily as needed Only in gout flare   , Disp: , Rfl:     diclofenac sodium (VOLTAREN) 1 %, Apply 2 g topically 4 (four) times a day, Disp: 100 g, Rfl: 0    gabapentin (NEURONTIN) 300 mg capsule, Take 1 capsule (300 mg total) by mouth daily at bedtime for 90 days, Disp: 90 capsule, Rfl: 0    levothyroxine 112 mcg tablet, Take 1 tablet (112 mcg total) by mouth daily, Disp: 90 tablet, Rfl: 1    Omega-3-acid Ethyl Esters & D3 1 & 1000 GM & UNIT KIT, Take 2 capsules by mouth 2 (two) times a day, Disp: , Rfl:     omeprazole (PriLOSEC) 20 mg delayed release capsule, Take 1 capsule (20 mg total) by mouth every morning, Disp: 90 capsule, Rfl: 1    propranolol (INDERAL) 80 mg tablet, TAKE 1/2 TABLET EVERY 12 HOURS DAILY (Patient taking differently: take one tablet daily), Disp: 90 tablet, Rfl: 3    spironolactone (ALDACTONE) 25 mg tablet, Take 1 tablet (25 mg total) by mouth daily, Disp: 90 tablet, Rfl: 1    Crisaborole (EUCRISA) 2 % OINT, Apply twice daily (Patient not taking: Reported on 12/13/2018 ), Disp: 60 g, Rfl: 0    hydrocortisone 2 5 % cream, Apply topically 2 (two) times a day (Patient not taking: Reported on 12/13/2018 ), Disp: 30 g, Rfl: 2    Laboratory Results:    Results from last 7 days  Lab Units 12/11/18  0921   POTASSIUM mmol/L 4 1   CHLORIDE mmol/L 105   CO2 mmol/L 28   BUN mg/dL 51*   CREATININE mg/dL 1 75*   CALCIUM mg/dL 9 1       Results for orders placed or performed in visit on 70/98/57   Basic metabolic panel   Result Value Ref Range    Sodium 141 136 - 145 mmol/L    Potassium 4 1 3 5 - 5 3 mmol/L    Chloride 105 100 - 108 mmol/L    CO2 28 21 - 32 mmol/L    ANION GAP 8 4 - 13 mmol/L    BUN 51 (H) 5 - 25 mg/dL    Creatinine 1 75 (H) 0 60 - 1 30 mg/dL    Glucose, Fasting 98 65 - 99 mg/dL    Calcium 9 1 8 3 - 10 1 mg/dL    eGFR 27 ml/min/1 73sq m   Protein electrophoresis, serum   Result Value Ref Range    A/G Ratio 1 39 1 10 - 1 80    Albumin Electrophoresis 58 1 52 0 - 65 0 %    Albumin CONC 4 07 3 50 - 5 00 g/dl    Alpha 1 5 0 2 5 - 5 0 %    ALPHA 1 CONC 0 35 0 10 - 0 40 g/dL    Alpha 2 13 1 (H) 7 0 - 13 0 %    ALPHA 2 CONC 0 92 0 40 - 1 20 g/dL    Beta-1 6 1 5 0 - 13 0 %    BETA 1 CONC 0 43 0 40 - 0 80 g/dL    Beta-2 5 0 2 0 - 8 0 %    BETA 2 CONC 0 35 0 20 - 0 50 g/dL    Gamma Globulin 12 7 12 0 - 22 0 %    GAMMA CONC 0 89 0 50 - 1 60 g/dL    SPEP Interpretation       The serum total protein, albumin and electrophoresis are within normal limits  No monoclonal bands noted   Reviewed by: Tyree Martinez MD **Electronic Signature**    Total Protein 7 0 6 4 - 8 2 g/dL   C3 complement   Result Value Ref Range    C3 Complement 144 0 90 0 - 180 0 mg/dL   C4 complement   Result Value Ref Range    C4, COMPLEMENT 34 0 10 0 - 40 0 mg/dL   Immunoglobulin free LT chains blood   Result Value Ref Range    Ig Kappa Free Light Chain 23 1 (H) 3 3 - 19 4 mg/L    Ig Lambda Free Light Chain 15 7 5 7 - 26 3 mg/L    Kappa/Lambda FluidC Ratio 1 47 0 26 - 1 65

## 2018-12-13 NOTE — LETTER
December 13, 2018     Ian Eckert MD  [address]  [facility] [address]    Patient: Silvana Archibald   YOB: 1940   Date of Visit: 12/13/2018       Dear Dr Aly Harvey:    Thank you for referring Brenda Handing to me for evaluation  Below are my notes for this consultation  If you have questions, please do not hesitate to call me  I look forward to following your patient along with you  Sincerely,        Della Ulloa MD        CC: No Recipients  Della Ulloa MD  12/13/2018 11:54 AM  Sign at close encounter  [address] [facility] 77 y o  female MRN: [id number]  12/13/2018    Reason for Visit: CKD III, SHABANA    ASSESSMENT and PLAN:    I had the pleasure of seeing Ms Bryant Welsh today in the renal clinic for the continued management of CKD III, SHABANA  Since our last visit, there has been no ER visits or hospitalizations  He currently has no complaints at this time and is feeling well  Patient denies any chest pain, shortness of breath and swelling  The last blood work was done on december, which we have reviewed together  11-year-old female with a past medical history of chronic kidney disease, venous stasis, HTN, hypothyroidism, lumbar canal stenosis, Anxiety, GERD, neuropathy, Gout, EF 76-50%, Grade 2 diastolic dysfunction who presents for follow up for CKD  To note, patient's  has now passed away in August      1) CKD - Prior year had nl Cr prior to aug 2016 and then had SHABANA during diarrhea episode  To note, patient also has a long standing history of HTN  Creatinine in 2013, 0 9 mg/dL  Cr early 2016 was 0 80-0 9 mg/dL; then increased starting in august 2016 to 2 3 mg/dL and has fluctuated since  CT scan in 2016, kidneys appearing normal at that time  UA in august was bland       Etiology of CKD may be long standing HTN and ATN  Baseline Cr ~ 1 1 -1 3 mg/dL, but most recently in October the creatinine was rising to 1 7 mg/dL    Patient at that time had signs of volume overload      - Urine eos 0%;   - A1c 5 6% in nov  - -20 WBC  Patient asymptomatic   -U PCR stable  0 17   -SPEP unrevealing  -UPEP unrevealing  - C3, C4 unrevealing  - Renal u/s with R 10 3 cm, L 10 4 cm, renal cortex 1 cm b/l; both kidneys slightly reduced in size; lower pole of R kidney is non obstructing calculus  - Pt follows with Urology team for nephrolithiasis  - No NSAIDs, the patient is not currently taking NSAID  - continue Bumex 2 mg in AM and advised to take extra half dose in evening until weight is 220 lbs  Pt has 2+ LE edema today  -nuc stress test per Cardiology in Jan  - check renal u/s as serologies neg and prior hist of stone  -BMP next week     2) SOB - Volume - follows with Cardiology     -see above  - improved with increase bumex     3) HTN -      - continue amlodipine/olmesartan; bumex, spironolactone, Propranolol  -may need to hold amlodipine if blood pressures are less than 729 systolic     4) hypothyroid - as per Primary Care Physician     5) HPL - Primary Care following       6) Electrolytes - stable     7) MBD -Vit D 48 appropriate  Phos 3 7 -- appropriate       - PTH rising 129 - monitor for now     8) gout -      - on allopurinol - may need to reduce dose if Cr rises or GFR < 20  -patient only takes colchicine up to 3 doses max when has a gout flare     9) back pain - follows with Pain management     10) Colonoscopy in feb with internal hemorrhoids and polyp removed       11) alk phos elevation     - stable, but still elevated  - LFTs nl in may  - was started on allopurinol since last check when was normal  Could be related to allopurinol     12) Anemia - Hb stable     It was a pleasure evaluating your patient in the office today  Thank you for allowing our team to participate in the care of Ms Isac Zaragoza  Please do not hesitate to contact our team if further issues/questions shall arise in the interim       No problem-specific Assessment & Plan notes found for this encounter  HPI:    Pt denies complaints with exception of occ SOB with ambulation  Prior SOB has improved  PATIENT INSTRUCTIONS:    Patient Instructions   1) please complete renal ultrasound  2) labwork in 4-6 weeks  3) appointment in 2 months        OBJECTIVE:  Current Weight: Weight - Scale: 102 kg (224 lb)  Vitals:    12/13/18 1111   BP: 128/70   BP Location: Left arm   Patient Position: Sitting   Cuff Size: Standard   Weight: 102 kg (224 lb)   Height: 5' 1" (1 549 m)    Body mass index is 42 32 kg/m²  REVIEW OF SYSTEMS:    Review of Systems   Constitutional: Negative  Negative for fatigue  HENT: Negative  Eyes: Negative  Respiratory: Positive for shortness of breath  Cardiovascular: Negative  Negative for leg swelling  Gastrointestinal: Negative  Endocrine: Negative  Genitourinary: Negative  Negative for difficulty urinating  Musculoskeletal: Negative  Skin: Negative  Allergic/Immunologic: Negative  Neurological: Negative  Hematological: Negative  Psychiatric/Behavioral: Negative  All other systems reviewed and are negative  PHYSICAL EXAM:      Physical Exam   Constitutional: She is oriented to person, place, and time  She appears well-developed and well-nourished  No distress  HENT:   Head: Normocephalic and atraumatic  Mouth/Throat: No oropharyngeal exudate  Eyes: Conjunctivae are normal  Right eye exhibits no discharge  Left eye exhibits no discharge  No scleral icterus  Neck: Normal range of motion  Neck supple  No JVD present  Cardiovascular: Normal rate and regular rhythm  Exam reveals no gallop and no friction rub  No murmur heard  Pulmonary/Chest: Effort normal and breath sounds normal  No respiratory distress  She has no wheezes  She has no rales  Abdominal: Soft  Bowel sounds are normal  She exhibits no distension  There is no tenderness  There is no rebound  Musculoskeletal: Normal range of motion   She exhibits edema  She exhibits no tenderness or deformity  Neurological: She is alert and oriented to person, place, and time  Coordination normal    Skin: Skin is warm and dry  No rash noted  She is not diaphoretic  No erythema  No pallor  Psychiatric: She has a normal mood and affect  Her behavior is normal  Judgment and thought content normal    Nursing note and vitals reviewed        Medications:    Current Outpatient Prescriptions:     allopurinol (ZYLOPRIM) 300 mg tablet, Take 1 tablet (300 mg total) by mouth every morning, Disp: 90 tablet, Rfl: 1    amlodipine-olmesartan (BLACK) 10-40 MG, TAKE 1 TABLET ONE TIME DAILY (Patient taking differently: pt takes 5-20mg ,1 tab daily ), Disp: 90 tablet, Rfl: 3    aspirin 81 MG tablet, Take 81 mg by mouth every morning  , Disp: , Rfl:     bumetanide (BUMEX) 2 mg tablet, Take 0 5 tablets (1 mg total) by mouth 2 (two) times a day (Patient taking differently: Take 2 mg by mouth daily Take one tablet daily; take extra 1/2 pill in the afternoon as needed for swelling ), Disp: 90 tablet, Rfl: 0    cholecalciferol (VITAMIN D3) 1,000 units tablet, Take 1 tablet by mouth daily, Disp: , Rfl:     clobetasol (TEMOVATE) 0 05 % ointment, Apply topically 2 (two) times a day, Disp: 30 g, Rfl: 1    colchicine (COLCRYS) 0 6 mg tablet, Take 0 6 mg by mouth daily as needed Only in gout flare   , Disp: , Rfl:     diclofenac sodium (VOLTAREN) 1 %, Apply 2 g topically 4 (four) times a day, Disp: 100 g, Rfl: 0    gabapentin (NEURONTIN) 300 mg capsule, Take 1 capsule (300 mg total) by mouth daily at bedtime for 90 days, Disp: 90 capsule, Rfl: 0    levothyroxine 112 mcg tablet, Take 1 tablet (112 mcg total) by mouth daily, Disp: 90 tablet, Rfl: 1    Omega-3-acid Ethyl Esters & D3 1 & 1000 GM & UNIT KIT, Take 2 capsules by mouth 2 (two) times a day, Disp: , Rfl:     omeprazole (PriLOSEC) 20 mg delayed release capsule, Take 1 capsule (20 mg total) by mouth every morning, Disp: 90 capsule, Rfl: 1    propranolol (INDERAL) 80 mg tablet, TAKE 1/2 TABLET EVERY 12 HOURS DAILY (Patient taking differently: take one tablet daily), Disp: 90 tablet, Rfl: 3    spironolactone (ALDACTONE) 25 mg tablet, Take 1 tablet (25 mg total) by mouth daily, Disp: 90 tablet, Rfl: 1    Crisaborole (EUCRISA) 2 % OINT, Apply twice daily (Patient not taking: Reported on 12/13/2018 ), Disp: 60 g, Rfl: 0    hydrocortisone 2 5 % cream, Apply topically 2 (two) times a day (Patient not taking: Reported on 12/13/2018 ), Disp: 30 g, Rfl: 2    Laboratory Results:    Results from last 7 days  Lab Units 12/11/18  0921   POTASSIUM mmol/L 4 1   CHLORIDE mmol/L 105   CO2 mmol/L 28   BUN mg/dL 51*   CREATININE mg/dL 1 75*   CALCIUM mg/dL 9 1       Results for orders placed or performed in visit on 02/90/35   Basic metabolic panel   Result Value Ref Range    Sodium 141 136 - 145 mmol/L    Potassium 4 1 3 5 - 5 3 mmol/L    Chloride 105 100 - 108 mmol/L    CO2 28 21 - 32 mmol/L    ANION GAP 8 4 - 13 mmol/L    BUN 51 (H) 5 - 25 mg/dL    Creatinine 1 75 (H) 0 60 - 1 30 mg/dL    Glucose, Fasting 98 65 - 99 mg/dL    Calcium 9 1 8 3 - 10 1 mg/dL    eGFR 27 ml/min/1 73sq m   Protein electrophoresis, serum   Result Value Ref Range    A/G Ratio 1 39 1 10 - 1 80    Albumin Electrophoresis 58 1 52 0 - 65 0 %    Albumin CONC 4 07 3 50 - 5 00 g/dl    Alpha 1 5 0 2 5 - 5 0 %    ALPHA 1 CONC 0 35 0 10 - 0 40 g/dL    Alpha 2 13 1 (H) 7 0 - 13 0 %    ALPHA 2 CONC 0 92 0 40 - 1 20 g/dL    Beta-1 6 1 5 0 - 13 0 %    BETA 1 CONC 0 43 0 40 - 0 80 g/dL    Beta-2 5 0 2 0 - 8 0 %    BETA 2 CONC 0 35 0 20 - 0 50 g/dL    Gamma Globulin 12 7 12 0 - 22 0 %    GAMMA CONC 0 89 0 50 - 1 60 g/dL    SPEP Interpretation       The serum total protein, albumin and electrophoresis are within normal limits  No monoclonal bands noted   Reviewed by: Zeina Mensah MD **Electronic Signature**    Total Protein 7 0 6 4 - 8 2 g/dL   C3 complement   Result Value Ref Range C3 Complement 144 0 90 0 - 180 0 mg/dL   C4 complement   Result Value Ref Range    C4, COMPLEMENT 34 0 10 0 - 40 0 mg/dL   Immunoglobulin free LT chains blood   Result Value Ref Range    Ig Kappa Free Light Chain 23 1 (H) 3 3 - 19 4 mg/L    Ig Lambda Free Light Chain 15 7 5 7 - 26 3 mg/L    Kappa/Lambda FluidC Ratio 1 47 0 26 - 1 65

## 2018-12-29 DIAGNOSIS — M54.16 RADICULOPATHY OF LUMBAR REGION: ICD-10-CM

## 2018-12-31 RX ORDER — GABAPENTIN 300 MG/1
CAPSULE ORAL
Qty: 90 CAPSULE | Refills: 0 | Status: SHIPPED | OUTPATIENT
Start: 2018-12-31 | End: 2019-05-10 | Stop reason: SDUPTHER

## 2019-01-03 ENCOUNTER — HOSPITAL ENCOUNTER (OUTPATIENT)
Dept: RADIOLOGY | Facility: HOSPITAL | Age: 79
Discharge: HOME | End: 2019-01-03
Attending: INTERNAL MEDICINE
Payer: MEDICARE

## 2019-01-03 DIAGNOSIS — N17.9 AKI (ACUTE KIDNEY INJURY) (HCC): ICD-10-CM

## 2019-01-03 DIAGNOSIS — N18.30 CKD (CHRONIC KIDNEY DISEASE), STAGE III (HCC): ICD-10-CM

## 2019-01-03 PROCEDURE — 76770 US EXAM ABDO BACK WALL COMP: CPT

## 2019-01-03 NOTE — PROGRESS NOTES
Hello    Patient normally is followed up by Ms Benedict Mcdaniel  Can patient be notified that renal u/s supports chronic kidney disease with thinning of kidneys; has small stone on R kidney; nothing to do for this for now; if pt develops any flank pain, difficulty urinating, she should call right away       Thank you    np

## 2019-01-04 ENCOUNTER — TELEPHONE (OUTPATIENT)
Dept: NEPHROLOGY | Facility: CLINIC | Age: 79
End: 2019-01-04

## 2019-01-04 NOTE — TELEPHONE ENCOUNTER
Pt aware of the above   ----- Message from Kimber Manzanares MD sent at 1/3/2019  5:13 PM EST -----  Hello    Patient normally is followed up by Ms Ana Wilson  Can patient be notified that renal u/s supports chronic kidney disease with thinning of kidneys; has small stone on R kidney; nothing to do for this for now; if pt develops any flank pain, difficulty urinating, she should call right away       Thank you    np

## 2019-01-07 DIAGNOSIS — I10 ESSENTIAL HYPERTENSION: ICD-10-CM

## 2019-01-09 RX ORDER — BUMETANIDE 2 MG/1
TABLET ORAL
Qty: 90 TABLET | Refills: 1 | OUTPATIENT
Start: 2019-01-09

## 2019-01-14 ENCOUNTER — HOSPITAL ENCOUNTER (OUTPATIENT)
Dept: RADIOLOGY | Facility: HOSPITAL | Age: 79
Discharge: HOME | End: 2019-01-14
Attending: INTERNAL MEDICINE
Payer: MEDICARE

## 2019-01-14 ENCOUNTER — HOSPITAL ENCOUNTER (OUTPATIENT)
Dept: NON INVASIVE DIAGNOSTICS | Facility: HOSPITAL | Age: 79
Discharge: HOME | End: 2019-01-14
Attending: INTERNAL MEDICINE
Payer: MEDICARE

## 2019-01-14 DIAGNOSIS — R07.9 CHEST PAIN IN ADULT: ICD-10-CM

## 2019-01-14 DIAGNOSIS — R60.0 BILATERAL LEG EDEMA: ICD-10-CM

## 2019-01-14 DIAGNOSIS — R06.00 EXERTIONAL DYSPNEA: ICD-10-CM

## 2019-01-14 LAB
CHEST PAIN STATEMENT: NORMAL
MAX DIASTOLIC BP: 62 MMHG
MAX HEART RATE: 74 BPM
MAX PREDICTED HEART RATE: 141 BPM
MAX. SYSTOLIC BP: 137 MMHG
PROTOCOL NAME: NORMAL
REASON FOR TERMINATION: NORMAL
TARGET HR FORMULA: NORMAL
TEST INDICATION: NORMAL
TIME IN EXERCISE PHASE: NORMAL

## 2019-01-14 PROCEDURE — A9502 TC99M TETROFOSMIN: HCPCS

## 2019-01-14 PROCEDURE — 78452 HT MUSCLE IMAGE SPECT MULT: CPT

## 2019-01-14 PROCEDURE — 93017 CV STRESS TEST TRACING ONLY: CPT

## 2019-01-14 RX ADMIN — REGADENOSON 0.4 MG: 0.08 INJECTION, SOLUTION INTRAVENOUS at 09:55

## 2019-01-15 ENCOUNTER — TELEPHONE (OUTPATIENT)
Dept: CARDIOLOGY CLINIC | Facility: CLINIC | Age: 79
End: 2019-01-15

## 2019-01-15 PROCEDURE — 78452 HT MUSCLE IMAGE SPECT MULT: CPT | Performed by: INTERNAL MEDICINE

## 2019-01-15 PROCEDURE — 93016 CV STRESS TEST SUPVJ ONLY: CPT | Performed by: INTERNAL MEDICINE

## 2019-01-15 PROCEDURE — 93018 CV STRESS TEST I&R ONLY: CPT | Performed by: INTERNAL MEDICINE

## 2019-01-15 NOTE — TELEPHONE ENCOUNTER
S/w pt - per Dr Aditya Martin pt should be referred to pulmonologist - testing ruled out cardiac; provided Dr Meléndez Scriver telephone # - [phone]

## 2019-01-15 NOTE — TELEPHONE ENCOUNTER
----- Message from Petar Edward MD sent at 1/15/2019 11:16 AM EST -----  Stress test was negative  How are her symptoms?  Will discuss further on follow-up

## 2019-01-15 NOTE — TELEPHONE ENCOUNTER
S/w pt - made aware of stress results - pt still having difficulty breathing, worst at night, can't sleep    Not due to see you until 6/2019 - advise

## 2019-02-04 ENCOUNTER — CONSULT (OUTPATIENT)
Dept: PULMONOLOGY | Facility: MEDICAL CENTER | Age: 79
End: 2019-02-04
Payer: MEDICARE

## 2019-02-04 VITALS
HEIGHT: 60 IN | BODY MASS INDEX: 43.59 KG/M2 | DIASTOLIC BLOOD PRESSURE: 54 MMHG | RESPIRATION RATE: 12 BRPM | OXYGEN SATURATION: 96 % | HEART RATE: 60 BPM | SYSTOLIC BLOOD PRESSURE: 130 MMHG | WEIGHT: 222 LBS | TEMPERATURE: 97.8 F

## 2019-02-04 DIAGNOSIS — G47.33 OBSTRUCTIVE SLEEP APNEA: ICD-10-CM

## 2019-02-04 DIAGNOSIS — R06.02 SHORTNESS OF BREATH: Primary | ICD-10-CM

## 2019-02-04 DIAGNOSIS — R06.02 SOB (SHORTNESS OF BREATH): ICD-10-CM

## 2019-02-04 PROCEDURE — 94010 BREATHING CAPACITY TEST: CPT | Performed by: NURSE PRACTITIONER

## 2019-02-04 PROCEDURE — 99214 OFFICE O/P EST MOD 30 MIN: CPT | Performed by: NURSE PRACTITIONER

## 2019-02-04 RX ORDER — HYDROXYZINE HYDROCHLORIDE 25 MG/1
TABLET, FILM COATED ORAL
Refills: 2 | COMMUNITY
Start: 2019-01-23 | End: 2019-06-12

## 2019-02-04 NOTE — ASSESSMENT & PLAN NOTE
Patient has a history of diastolic dysfunction  Last 2D echo was in 2016  She had grade 2 diastolic dysfunction estimated PA pressure of 46 mm of mercury  Room air oxygen at rest was 97% and ambulating 60 ft was 96%  She is obese with a BMI of 44  Likely her shortness of breath  Is multifactorial   I did review her last nuclear stress test which  Was normal   I will order a D-dimer as well as a repeat 2D echocardiogram   She also has a history of chronic kidney disease and last CBC in October 2018 revealed hemoglobin 11 4  I will repeat a CBC with differential   D-dimer will be drawn and also repeat 2D echo  Pulmonary function test was reviewed and was normal   Forced vital capacity was 2 16 L or 102% of predicted, FEV1 was 1 67 L or 107% of predicted obstruction ratio was normal at 77%  Flow volume loop also showed normal pattern  Patient had CT of abdomen and pelvis in August of 2016  I did review images  There was no evidence of any emphysema  There was a stable 2-3 mm pulmonary nodule  I will order a chest x-ray as she has not had any imaging done for approximately 3 years  If D-dimer is markedly elevated we can also order a Doppler of lower extremities  He does have chronic lymphedema and leg swelling  Again 2D echo will be repeated to evaluate for any elevation of estimated pulmonary artery reading  I did review plan of care with patient

## 2019-02-04 NOTE — PATIENT INSTRUCTIONS
I have ordered lab work for you, a chest x-ray and also an order  For a echocardiogram of year heart  This is a ultrasound of year  Heart valves and heart chambers  There could be many causes contributing  To her shortness of breath  It appears that year obstructive sleep apnea is well  Treated    Your pulmonary function tests were normal

## 2019-02-04 NOTE — ASSESSMENT & PLAN NOTE
Patient was diagnosed with severe obstructive sleep apnea in April 2016  Apneic hypopnea index was 62 events per hour and she did go on for treatment study  He is currently on BiPAP  21/19  Compliance data is reviewed for the last 30 days  She has excellent compliance and her average usage is 9 hr and 51 min  AHI is reduced to 2 1 events per hour

## 2019-02-04 NOTE — PROGRESS NOTES
Assessment/Plan:     Problem List Items Addressed This Visit        Respiratory    Obstructive sleep apnea     Patient was diagnosed with severe obstructive sleep apnea in April 2016  Apneic hypopnea index was 62 events per hour and she did go on for treatment study  He is currently on BiPAP  21/19  Compliance data is reviewed for the last 30 days  She has excellent compliance and her average usage is 9 hr and 51 min  AHI is reduced to 2 1 events per hour  Other    SOB (shortness of breath)     Patient has a history of diastolic dysfunction  Last 2D echo was in 2016  She had grade 2 diastolic dysfunction estimated PA pressure of 46 mm of mercury  Room air oxygen at rest was 97% and ambulating 60 ft was 96%  She is obese with a BMI of 44  Likely her shortness of breath  Is multifactorial   I did review her last nuclear stress test which  Was normal   I will order a D-dimer as well as a repeat 2D echocardiogram   She also has a history of chronic kidney disease and last CBC in October 2018 revealed hemoglobin 11 4  I will repeat a CBC with differential   D-dimer will be drawn and also repeat 2D echo  Pulmonary function test was reviewed and was normal   Forced vital capacity was 2 16 L or 102% of predicted, FEV1 was 1 67 L or 107% of predicted obstruction ratio was normal at 77%  Flow volume loop also showed normal pattern  Patient had CT of abdomen and pelvis in August of 2016  I did review images  There was no evidence of any emphysema  There was a stable 2-3 mm pulmonary nodule  I will order a chest x-ray as she has not had any imaging done for approximately 3 years  If D-dimer is markedly elevated we can also order a Doppler of lower extremities  He does have chronic lymphedema and leg swelling  Again 2D echo will be repeated to evaluate for any elevation of estimated pulmonary artery reading  I did review plan of care with patient           Relevant Orders    Echo complete with contrast if indicated      Other Visit Diagnoses     Shortness of breath    -  Primary    Relevant Orders    POCT spirometry (Completed)    CBC and differential    D-dimer, quantitative    XR chest pa & lateral          HPI:  Kennedi Varghese is a 77year old female with exertional dyspnea  She has never smoked and was last seen by cardiologist Dr Ema Denise on 12/11/19  She has history of acute on chsronic diastolic heart failure with lymphedema  Patient also has history of acute on chronic kidney disease  According to patient she has become more short of breath in the last 6 months  She has a history of obstructive sleep apnea  She was diagnosed with severe obstructive sleep apnea April 2016  AHI was 62 events per hour  She went on for a treatment study May 13, 2016  It appears that she was room mediated with BiPAP at 21/19 cm of water pressure  Minimum oxygen saturation was maintained above 90%  Patient did have a nuclear myocardial perfusion SPECT test on January 14, 2019  There were no perfusion defects  Left ventricular ejection fraction was 75%  It was a normal study  Patient did have a 2D echo on January 15, 2016  Ejection fraction was 50-55%  She had grade 2 diastolic dysfunction  She had dilated left atrium and dilated right atrium  There was mild regurgitation of the tricuspid valve with estimated PA pressure of 46 mm of mercury  Return in about 3 months (around 5/4/2019)  All questions are answered to the patient's satisfaction and understanding  She verbalizes understanding  She is encouraged to call with any further questions or concerns  Portions of the record may have been created with voice recognition software  Occasional wrong word or "sound a like" substitutions may have occurred due to the inherent limitations of voice recognition software  Read the chart carefully and recognize, using context, where substitutions have occurred      Electronically Signed by Anisa Recinos CRNP    ______________________________________________________________________    Chief Complaint:   Chief Complaint   Patient presents with   Rocky Keita for SOB    Shortness of Breath     Minimal exertion       Patient ID: Maritza Alcantara is a 77 y o  y o  female has a past medical history of Arthritis; Cataract; Disease of thyroid gland; Eczema; GERD (gastroesophageal reflux disease); Gout; Heart failure ([address] ); Hepatitis; Hiatal hernia; Hypertension; Onychomycosis; Orthopnea; and Sleep apnea  2/4/2019  Patient presents today for follow-up visit  Shortness of Breath   This is a chronic problem  The current episode started more than 1 year ago  The problem occurs intermittently  The problem has been gradually worsening  Associated symptoms include leg swelling  The symptoms are aggravated by exercise  The patient has no known risk factors for DVT/PE  She has tried rest for the symptoms  The treatment provided mild relief  Her past medical history is significant for a heart failure  Review of Systems   HENT: Negative  Eyes: Negative  Respiratory: Positive for shortness of breath  Cardiovascular: Positive for leg swelling  Gastrointestinal: Negative  Endocrine: Negative  Genitourinary: Negative  Musculoskeletal: Negative  Skin: Negative  Allergic/Immunologic: Negative  Neurological: Negative  Hematological: Negative  Psychiatric/Behavioral: Negative  Smoking history: She reports that she has never smoked   She has never used smokeless tobacco     The following portions of the patient's history were reviewed and updated as appropriate: allergies, current medications, past family history, past medical history, past social history, past surgical history and problem list     Immunization History   Administered Date(s) Administered    Influenza Split High Dose Preservative Free IM 10/09/2012, 09/24/2013, 09/09/2014, 10/02/2015, 09/23/2016, 10/24/2017  Influenza TIV (IM) 10/25/2001, 10/28/2005, 10/10/2006, 10/12/2007, 10/14/2008, 09/17/2010, 09/23/2011    Influenza, high dose seasonal 0 5 mL 10/05/2018    Pneumococcal Conjugate 13-Valent 11/15/2017    Pneumococcal Polysaccharide PPV23 10/10/2006    Zoster 11/30/2017     Current Outpatient Prescriptions   Medication Sig Dispense Refill    allopurinol (ZYLOPRIM) 300 mg tablet Take 1 tablet (300 mg total) by mouth every morning 90 tablet 1    amlodipine-olmesartan (BLACK) 10-40 MG TAKE 1 TABLET ONE TIME DAILY (Patient taking differently: pt takes 5-20mg ,1 tab daily ) 90 tablet 3    aspirin 81 MG tablet Take 81 mg by mouth every morning        bumetanide (BUMEX) 2 mg tablet Take 0 5 tablets (1 mg total) by mouth 2 (two) times a day (Patient taking differently: Take 2 mg by mouth daily Take one tablet daily; take extra 1/2 pill in the afternoon as needed for swelling ) 90 tablet 0    cholecalciferol (VITAMIN D3) 1,000 units tablet Take 1 tablet by mouth daily      clobetasol (TEMOVATE) 0 05 % ointment Apply topically 2 (two) times a day 30 g 1    colchicine (COLCRYS) 0 6 mg tablet Take 0 6 mg by mouth daily as needed Only in gout flare          Crisaborole (EUCRISA) 2 % OINT Apply twice daily 60 g 0    diclofenac sodium (VOLTAREN) 1 % Apply 2 g topically 4 (four) times a day 100 g 0    gabapentin (NEURONTIN) 300 mg capsule TAKE 1 CAPSULE EVERY DAY AT BEDTIME 90 capsule 0    halobetasol (ULTRAVATE) 0 05 % cream   2    hydrocortisone 2 5 % cream Apply topically 2 (two) times a day 30 g 2    levothyroxine 112 mcg tablet Take 1 tablet (112 mcg total) by mouth daily 90 tablet 1    Omega-3-acid Ethyl Esters & D3 1 & 1000 GM & UNIT KIT Take 2 capsules by mouth 2 (two) times a day      omeprazole (PriLOSEC) 20 mg delayed release capsule Take 1 capsule (20 mg total) by mouth every morning 90 capsule 1    propranolol (INDERAL) 80 mg tablet TAKE 1/2 TABLET EVERY 12 HOURS DAILY (Patient taking differently: take one tablet daily) 90 tablet 3    spironolactone (ALDACTONE) 25 mg tablet Take 1 tablet (25 mg total) by mouth daily 90 tablet 1    hydrOXYzine HCL (ATARAX) 25 mg tablet TK 1 T PO QHS  2     No current facility-administered medications for this visit  Allergies: Patient has no known allergies  Objective:  Vitals:    02/04/19 1038   BP: 130/54   BP Location: Left arm   Patient Position: Sitting   Cuff Size: Standard   Pulse: 60   Resp: 12   Temp: 97 8 °F (36 6 °C)   TempSrc: Tympanic   SpO2: 96%   Weight: 101 kg (222 lb)   Height: 4' 11 5" (1 511 m)   Oxygen Therapy  SpO2: 96 %    Wt Readings from Last 3 Encounters:   02/04/19 101 kg (222 lb)   12/13/18 102 kg (224 lb)   12/11/18 101 kg (222 lb)     Body mass index is 44 09 kg/m²  Physical Exam   Constitutional: She is oriented to person, place, and time  She appears well-developed and well-nourished  HENT:   Head: Normocephalic and atraumatic  Mallampati 4   Eyes: Pupils are equal, round, and reactive to light  Conjunctivae are normal    Neck: Normal range of motion  Cardiovascular: Normal rate and regular rhythm  Pulmonary/Chest: Effort normal    Abdominal: Soft  Musculoskeletal: Normal range of motion  Neurological: She is alert and oriented to person, place, and time  Skin: Skin is warm and dry  Psychiatric: She has a normal mood and affect  Her behavior is normal  Thought content normal        Lab Review:   Hospital Outpatient Visit on 01/14/2019   Component Date Value    Protocol Name 01/14/2019 LEXISCAN     Time In Exercise Phase 01/14/2019 00:01:00     MAX   SYSTOLIC BP 42/87/3078 960     Max Diastolic Bp 31/82/9281 62     Max Heart Rate 01/14/2019 74     Max Predicted Heart Rate 01/14/2019 141     Reason for Termination 01/14/2019 PROTOCOL COMPLETED     Test Indication 01/14/2019 Dyspnea     Target Hr Formular 01/14/2019 (220 - Age)*100%     Chest Pain Statement 01/14/2019 none    Lab on 12/11/2018   Component Date Value    Sodium 12/11/2018 141     Potassium 12/11/2018 4 1     Chloride 12/11/2018 105     CO2 12/11/2018 28     ANION GAP 12/11/2018 8     BUN 12/11/2018 51*    Creatinine 12/11/2018 1 75*    Glucose, Fasting 12/11/2018 98     Calcium 12/11/2018 9 1     eGFR 12/11/2018 27     A/G Ratio 12/11/2018 1 39     Albumin Electrophoresis 12/11/2018 58 1     Albumin CONC 12/11/2018 4 07     Alpha 1 12/11/2018 5 0     ALPHA 1 CONC 12/11/2018 0 35     Alpha 2 12/11/2018 13 1*    ALPHA 2 CONC 12/11/2018 0 92     Beta-1 12/11/2018 6 1     BETA 1 CONC 12/11/2018 0 43     Beta-2 12/11/2018 5 0     BETA 2 CONC 12/11/2018 0 35     Gamma Globulin 12/11/2018 12 7     GAMMA CONC 12/11/2018 0 89     SPEP Interpretation 12/11/2018 The serum total protein, albumin and electrophoresis are within normal limits  No monoclonal bands noted  Reviewed by: Sindhu Driscoll MD **Electronic Signature**     Total Protein 12/11/2018 7 0     C3 Complement 12/11/2018 144 0     C4, COMPLEMENT 12/11/2018 34 0     Ig Burley Free Light Chain 12/11/2018 23 1*    Ig Lambda Free Light Niecy* 12/11/2018 15 7     Kappa/Lambda FluidC Ratio 12/11/2018 1 47    Telephone on 11/12/2018   Component Date Value    Urine Protein 12/11/2018 15 0     Albumin ELP, Urine 12/11/2018 100 0     Alpha 1, Urine 12/11/2018 0 0     Alpha 2, Urine 12/11/2018 0 0     Beta, Urine 12/11/2018 0 0     Gamma Globulin, Urine 12/11/2018 0 0     UPEP Interp 12/11/2018 The urine total protein and electrophoresis are within normal limits  No monoclonal bands noted   Reviewed by: Sindhu Driscoll MD ** Electronic Signature**    Lab on 11/09/2018   Component Date Value    Sodium 11/09/2018 138     Potassium 11/09/2018 4 4     Chloride 11/09/2018 106     CO2 11/09/2018 26     ANION GAP 11/09/2018 6     BUN 11/09/2018 81*    Creatinine 11/09/2018 1 76*    Glucose, Fasting 11/09/2018 105*    Calcium 11/09/2018 9 4     eGFR 11/09/2018 27    Lab on 10/26/2018   Component Date Value    Phosphorus 10/26/2018 3 7     Creatinine, Ur 10/26/2018 98 3     Protein Urine Random 10/26/2018 17     Prot/Creat Ratio, Ur 10/26/2018 0 17*    PTH 10/26/2018 129 4*    Clarity, UA 10/26/2018 Cloudy     Color, UA 10/26/2018 Yellow     Specific Gravity, UA 10/26/2018 1 019     pH, UA 10/26/2018 7 0     Glucose, UA 10/26/2018 Negative     Ketones, UA 10/26/2018 Negative     Blood, UA 10/26/2018 Negative     Protein, UA 10/26/2018 Negative     Nitrite, UA 10/26/2018 Negative     Bilirubin, UA 10/26/2018 Negative     Urobilinogen, UA 10/26/2018 0 2     Leukocytes, UA 10/26/2018 Moderate*    WBC, UA 10/26/2018 10-20*    RBC, UA 10/26/2018 None Seen     Hyaline Casts, UA 10/26/2018 None Seen     Bacteria, UA 10/26/2018 Occasional     Epithelial Cells 10/26/2018 Moderate*    Vit D, 25-Hydroxy 10/26/2018 48 4        Diagnostics:  I have personally reviewed pertinent reports  Office Spirometry Results:  FEV1: 1 67 liters (107%)  FVC: 2 16 liters (102)  FEV1/FVC: 77 3 %  ESS:    No results found

## 2019-02-08 ENCOUNTER — LAB (OUTPATIENT)
Dept: LAB | Facility: CLINIC | Age: 79
End: 2019-02-08
Payer: MEDICARE

## 2019-02-08 DIAGNOSIS — N18.30 CKD (CHRONIC KIDNEY DISEASE), STAGE III (HCC): ICD-10-CM

## 2019-02-08 DIAGNOSIS — R06.02 SHORTNESS OF BREATH: ICD-10-CM

## 2019-02-08 LAB
ANION GAP SERPL CALCULATED.3IONS-SCNC: 9 MMOL/L (ref 4–13)
BACTERIA UR QL AUTO: ABNORMAL /HPF
BASOPHILS # BLD AUTO: 0.03 THOUSANDS/ΜL (ref 0–0.1)
BASOPHILS NFR BLD AUTO: 1 % (ref 0–1)
BILIRUB UR QL STRIP: NEGATIVE
BUN SERPL-MCNC: 77 MG/DL (ref 5–25)
CALCIUM SERPL-MCNC: 9.3 MG/DL (ref 8.3–10.1)
CHLORIDE SERPL-SCNC: 105 MMOL/L (ref 100–108)
CLARITY UR: CLEAR
CO2 SERPL-SCNC: 24 MMOL/L (ref 21–32)
COLOR UR: YELLOW
CREAT SERPL-MCNC: 2.04 MG/DL (ref 0.6–1.3)
CREAT UR-MCNC: 83.9 MG/DL
DEPRECATED D DIMER PPP: 1186 NG/ML (FEU)
EOSINOPHIL # BLD AUTO: 0.35 THOUSAND/ΜL (ref 0–0.61)
EOSINOPHIL NFR BLD AUTO: 5 % (ref 0–6)
ERYTHROCYTE [DISTWIDTH] IN BLOOD BY AUTOMATED COUNT: 15.9 % (ref 11.6–15.1)
GFR SERPL CREATININE-BSD FRML MDRD: 23 ML/MIN/1.73SQ M
GLUCOSE P FAST SERPL-MCNC: 105 MG/DL (ref 65–99)
GLUCOSE UR STRIP-MCNC: NEGATIVE MG/DL
HCT VFR BLD AUTO: 35 % (ref 34.8–46.1)
HGB BLD-MCNC: 10.9 G/DL (ref 11.5–15.4)
HGB UR QL STRIP.AUTO: NEGATIVE
HYALINE CASTS #/AREA URNS LPF: ABNORMAL /LPF
IMM GRANULOCYTES # BLD AUTO: 0.04 THOUSAND/UL (ref 0–0.2)
IMM GRANULOCYTES NFR BLD AUTO: 1 % (ref 0–2)
KETONES UR STRIP-MCNC: NEGATIVE MG/DL
LEUKOCYTE ESTERASE UR QL STRIP: ABNORMAL
LYMPHOCYTES # BLD AUTO: 0.93 THOUSANDS/ΜL (ref 0.6–4.47)
LYMPHOCYTES NFR BLD AUTO: 14 % (ref 14–44)
MCH RBC QN AUTO: 28.4 PG (ref 26.8–34.3)
MCHC RBC AUTO-ENTMCNC: 31.1 G/DL (ref 31.4–37.4)
MCV RBC AUTO: 91 FL (ref 82–98)
MONOCYTES # BLD AUTO: 0.59 THOUSAND/ΜL (ref 0.17–1.22)
MONOCYTES NFR BLD AUTO: 9 % (ref 4–12)
NEUTROPHILS # BLD AUTO: 4.71 THOUSANDS/ΜL (ref 1.85–7.62)
NEUTS SEG NFR BLD AUTO: 70 % (ref 43–75)
NITRITE UR QL STRIP: NEGATIVE
NON-SQ EPI CELLS URNS QL MICRO: ABNORMAL /HPF
NRBC BLD AUTO-RTO: 0 /100 WBCS
PH UR STRIP.AUTO: 6 [PH] (ref 4.5–8)
PLATELET # BLD AUTO: 175 THOUSANDS/UL (ref 149–390)
PMV BLD AUTO: 12 FL (ref 8.9–12.7)
POTASSIUM SERPL-SCNC: 4.4 MMOL/L (ref 3.5–5.3)
PROT UR STRIP-MCNC: NEGATIVE MG/DL
PROT UR-MCNC: 9 MG/DL
PROT/CREAT UR: 0.11 MG/G{CREAT} (ref 0–0.1)
RBC # BLD AUTO: 3.84 MILLION/UL (ref 3.81–5.12)
RBC #/AREA URNS AUTO: ABNORMAL /HPF
SODIUM SERPL-SCNC: 138 MMOL/L (ref 136–145)
SP GR UR STRIP.AUTO: 1.02 (ref 1–1.03)
UROBILINOGEN UR QL STRIP.AUTO: 0.2 E.U./DL
WBC # BLD AUTO: 6.65 THOUSAND/UL (ref 4.31–10.16)
WBC #/AREA URNS AUTO: ABNORMAL /HPF

## 2019-02-08 PROCEDURE — 81001 URINALYSIS AUTO W/SCOPE: CPT

## 2019-02-08 PROCEDURE — 80048 BASIC METABOLIC PNL TOTAL CA: CPT

## 2019-02-08 PROCEDURE — 85025 COMPLETE CBC W/AUTO DIFF WBC: CPT

## 2019-02-08 PROCEDURE — 85379 FIBRIN DEGRADATION QUANT: CPT

## 2019-02-08 PROCEDURE — 84156 ASSAY OF PROTEIN URINE: CPT

## 2019-02-08 PROCEDURE — 82570 ASSAY OF URINE CREATININE: CPT

## 2019-02-08 PROCEDURE — 36415 COLL VENOUS BLD VENIPUNCTURE: CPT

## 2019-02-11 DIAGNOSIS — R06.02 SHORTNESS OF BREATH: Primary | ICD-10-CM

## 2019-02-11 NOTE — RESULT ENCOUNTER NOTE
I will review with the patient at the appointment in 2 days  Patient's creatinine is rising again  Is unclear as to the etiology of the rise  We will have the patient hold ARB and spironolactone and change antihypertensives temporarily and repeat blood work in 1 week after the appointment  May need to consider renal Doppler studies, though the blood pressures appear to be well controlled  Urine with small amount of white cells  Prior urine eosinophils were negative  The rising creatinine may be more hemodynamic related  Will also need to check volume status on appointment in 2 days

## 2019-02-11 NOTE — PROGRESS NOTES
Patient has elevated D-dimer  Hemoglobin is stable at 10 9  Eight months ago her hemoglobin was 11 2  She is aware that D-dimer is nonspecific  However as she has chronic lower extremity swelling and also increased shortness of breath, a Doppler of lower extremities will be ordered  She is agreeable

## 2019-02-13 ENCOUNTER — OFFICE VISIT (OUTPATIENT)
Dept: NEPHROLOGY | Facility: CLINIC | Age: 79
End: 2019-02-13
Payer: MEDICARE

## 2019-02-13 VITALS
SYSTOLIC BLOOD PRESSURE: 124 MMHG | HEART RATE: 60 BPM | WEIGHT: 226.2 LBS | DIASTOLIC BLOOD PRESSURE: 62 MMHG | BODY MASS INDEX: 44.41 KG/M2 | HEIGHT: 60 IN

## 2019-02-13 DIAGNOSIS — N17.9 AKI (ACUTE KIDNEY INJURY) (HCC): Primary | ICD-10-CM

## 2019-02-13 PROCEDURE — 99213 OFFICE O/P EST LOW 20 MIN: CPT | Performed by: INTERNAL MEDICINE

## 2019-02-13 NOTE — PROGRESS NOTES
NEPHROLOGY OFFICE VISIT   July Enriquez 77 y o  female MRN: [id number]  2/13/2019    Reason for Visit: CKD III    ASSESSMENT and PLAN:    I had the pleasure of seeing Ms Alfreda Lee today in the renal clinic for the continued management of CKD III  Since our last visit, there has been no ER visits or hospitalizations  Patient continues to have SOB  74-year-old female with a past medical history of chronic kidney disease, venous stasis, HTN, hypothyroidism, lumbar canal stenosis, Anxiety, GERD, neuropathy, Gout, EF 44-33%, Grade 2 diastolic dysfunction who presents for follow up for CKD  To note, patient's  has now passed away in August      1) CKD - Prior year had nl Cr prior to aug 2016 and then had SHABANA during diarrhea episode  To note, patient also has a long standing history of HTN  Creatinine in 2013, 0 9 mg/dL  Cr early 2016 was 0 80-0 9 mg/dL; then increased starting in august 2016 to 2 3 mg/dL and has fluctuated since  CT scan in 2016, kidneys appearing normal at that time  UA in august was bland       Etiology of CKD may be long standing HTN and ATN  Baseline Cr ~ 1 1 -1 3 mg/dL, but recently in October the creatinine was rising to 1 7 mg/dL and this was thought to be a new baseline; but Cr is rising to 2 04  Pt denies cough, n/v       - Urine eos 0%;   - A1c 5 6% in nov  - UA 10-20  -U PCR stable  0 11  -SPEP unrevealing  -UPEP unrevealing  - C3, C4 unrevealing  - Renal u/s with R 10 3 cm, L 10 4 cm, renal cortex 1 cm b/l; both kidneys slightly reduced in size; lower pole of R kidney is non obstructing calculus  - Pt follows with Urology team for nephrolithiasis     - No NSAIDs, the patient is not currently taking NSAID  -nuc stress test per Cardiology in Jan unrevealing  - renal u/s unrevealing with exception of renal cortical atrophy; but also 6 mm non shadowing calculus  -BMP Friday  - lower bumetanide to 1 mg daily for 3 days and check labs on Friday then inc to 2 mg daily     Overall, patient's renal function is worsening  Patient does not have worsening proteinuria  Renal ultrasound is relatively unrevealing with the exception of chronic changes  And small stone  I would be concern for intravascular volume depletion  Lungs are clear to exam   There is edema on the lower extremities and this could be related to other issues such as right-sided failure?    Patient has a negative stress test   Echocardiogram is pending  Duplex of the lower extremities are pending  Therefore for now I have lowered the did Bumetanide  Advised the patient to monitor weights closely and call if the rise  Patient did feel better with increase in diuretics end of last year which does suggest some volume component, but clinically the patient does not appear overtly volume overloaded with the exception of lower extremity edema currently   -this may be progression of renal dysfunction, but this appears rapid and therefore we are completing the above tests in addition to pulmonary team      2) SOB - Volume - follows with Cardiology     -see above  - saw the Pulmonary team early feb as SOB has not improved  Awaiting ECHO and duplex of LE as edema still present and elevated D Dimer  - weight stable at home at 218 lbs   - may need to consider abd u/s  But abd soft  Prior CT in 2016 without ascites  Most recent LFTs nl     3) HTN -      - continue amlodipine/olmesartan; bumex, spironolactone, Propranolol  -may need to hold amlodipine if blood pressures are less than 692 systolic     4) hypothyroid - as per Primary Care Physician     5) HPL - Primary Care following       6) Electrolytes - stable     7) MBD -Vit D 48 appropriate   Phos 3 7 -- appropriate       - PTH rising 129 - monitor for now  - recheck next visit as last check was in october     8) gout -      - on allopurinol - may need to reduce dose if Cr rises or GFR < 20  -patient only takes colchicine up to 3 doses max when has a gout flare     9) back pain - follows with Pain management     10) Colonoscopy in feb with internal hemorrhoids and polyp removed       11) alk phos elevation     - stable, but still elevated  - LFTs nl in may  - was started on allopurinol since last check when was normal  Could be related to allopurinol     12) Anemia - Hb stable     It was a pleasure evaluating your patient in the office today  Thank you for allowing our team to participate in the care of Ms Jerman Young  Please do not hesitate to contact our team if further issues/questions shall arise in the interim  No problem-specific Assessment & Plan notes found for this encounter  HPI:    Pt denies complaints  Denies n/v  Chronic knee pain  Edema stable  Weight stable at home 118 lbs per patient  PATIENT INSTRUCTIONS:    Patient Instructions   1) weigh yourself daily as you are doing - call if you go above 222 lbs  2) labwork this Friday  3) take 1/2 tablets of bumex today, Thursday, and Friday of this week; then go back to one tablet daily        OBJECTIVE:  Current Weight: Weight - Scale: 103 kg (226 lb 3 2 oz)  Vitals:    02/13/19 0958   BP: 124/62   BP Location: Right arm   Patient Position: Sitting   Cuff Size: Large   Pulse: 60   Weight: 103 kg (226 lb 3 2 oz)   Height: 4' 11 5" (1 511 m)    Body mass index is 44 92 kg/m²  REVIEW OF SYSTEMS:    Review of Systems   Constitutional: Negative  Negative for fatigue  HENT: Negative  Eyes: Negative  Respiratory: Negative  Negative for shortness of breath  Cardiovascular: Negative  Negative for leg swelling  Gastrointestinal: Negative  Endocrine: Negative  Genitourinary: Negative  Negative for difficulty urinating  Musculoskeletal: Negative  Skin: Negative  Allergic/Immunologic: Negative  Neurological: Negative  Hematological: Negative  Psychiatric/Behavioral: Negative  All other systems reviewed and are negative        PHYSICAL EXAM:      Physical Exam   Constitutional: She is oriented to person, place, and time  She appears well-developed and well-nourished  No distress  HENT:   Head: Normocephalic and atraumatic  Mouth/Throat: No oropharyngeal exudate  Eyes: Conjunctivae are normal  Right eye exhibits no discharge  Left eye exhibits no discharge  No scleral icterus  Neck: Normal range of motion  Neck supple  No JVD present  Cardiovascular: Normal rate and regular rhythm  Exam reveals no gallop and no friction rub  No murmur heard  Pulmonary/Chest: Effort normal and breath sounds normal  No respiratory distress  She has no wheezes  She has no rales  Abdominal: Soft  Bowel sounds are normal  She exhibits no distension  There is no tenderness  There is no rebound  Musculoskeletal: She exhibits edema  She exhibits no tenderness or deformity  2 + LE edema b/l   Neurological: She is alert and oriented to person, place, and time  Coordination normal    Skin: Skin is warm and dry  No rash noted  She is not diaphoretic  No erythema  No pallor  Psychiatric: She has a normal mood and affect  Her behavior is normal  Judgment and thought content normal    Nursing note and vitals reviewed        Medications:    Current Outpatient Medications:     allopurinol (ZYLOPRIM) 300 mg tablet, Take 1 tablet (300 mg total) by mouth every morning, Disp: 90 tablet, Rfl: 1    amlodipine-olmesartan (BLACK) 10-40 MG, TAKE 1 TABLET ONE TIME DAILY (Patient taking differently: pt takes 5-20mg ,1 tab daily ), Disp: 90 tablet, Rfl: 3    aspirin 81 MG tablet, Take 81 mg by mouth every morning  , Disp: , Rfl:     bumetanide (BUMEX) 2 mg tablet, Take 0 5 tablets (1 mg total) by mouth 2 (two) times a day (Patient taking differently: Take 2 mg by mouth daily Take one tablet daily; take extra 1/2 pill in the afternoon as needed for swelling ), Disp: 90 tablet, Rfl: 0    cholecalciferol (VITAMIN D3) 1,000 units tablet, Take 1 tablet by mouth daily, Disp: , Rfl:     clobetasol (TEMOVATE) 0 05 % ointment, Apply topically 2 (two) times a day, Disp: 30 g, Rfl: 1    colchicine (COLCRYS) 0 6 mg tablet, Take 0 6 mg by mouth daily as needed Only in gout flare   , Disp: , Rfl:     Crisaborole (EUCRISA) 2 % OINT, Apply twice daily, Disp: 60 g, Rfl: 0    diclofenac sodium (VOLTAREN) 1 %, Apply 2 g topically 4 (four) times a day, Disp: 100 g, Rfl: 0    gabapentin (NEURONTIN) 300 mg capsule, TAKE 1 CAPSULE EVERY DAY AT BEDTIME, Disp: 90 capsule, Rfl: 0    halobetasol (ULTRAVATE) 0 05 % cream, , Disp: , Rfl: 2    hydrocortisone 2 5 % cream, Apply topically 2 (two) times a day, Disp: 30 g, Rfl: 2    hydrOXYzine HCL (ATARAX) 25 mg tablet, TK 1 T PO QHS, Disp: , Rfl: 2    levothyroxine 112 mcg tablet, Take 1 tablet (112 mcg total) by mouth daily, Disp: 90 tablet, Rfl: 1    Omega-3-acid Ethyl Esters & D3 1 & 1000 GM & UNIT KIT, Take 2 capsules by mouth 2 (two) times a day, Disp: , Rfl:     omeprazole (PriLOSEC) 20 mg delayed release capsule, Take 1 capsule (20 mg total) by mouth every morning, Disp: 90 capsule, Rfl: 1    propranolol (INDERAL) 80 mg tablet, TAKE 1/2 TABLET EVERY 12 HOURS DAILY (Patient taking differently: take one tablet daily), Disp: 90 tablet, Rfl: 3    spironolactone (ALDACTONE) 25 mg tablet, Take 1 tablet (25 mg total) by mouth daily, Disp: 90 tablet, Rfl: 1    Laboratory Results:  Results from last 7 days   Lab Units 02/08/19  0813   WBC Thousand/uL 6 65   HEMOGLOBIN g/dL 10 9*   HEMATOCRIT % 35 0   PLATELETS Thousands/uL 175   POTASSIUM mmol/L 4 4   CHLORIDE mmol/L 105   CO2 mmol/L 24   BUN mg/dL 77*   CREATININE mg/dL 2 04*   CALCIUM mg/dL 9 3       Results for orders placed or performed in visit on 22/87/61   Basic metabolic panel   Result Value Ref Range    Sodium 138 136 - 145 mmol/L    Potassium 4 4 3 5 - 5 3 mmol/L    Chloride 105 100 - 108 mmol/L    CO2 24 21 - 32 mmol/L    ANION GAP 9 4 - 13 mmol/L    BUN 77 (H) 5 - 25 mg/dL    Creatinine 2 04 (H) 0 60 - 1 30 mg/dL    Glucose, Fasting 105 (H) 65 - 99 mg/dL    Calcium 9 3 8 3 - 10 1 mg/dL    eGFR 23 ml/min/1 73sq m   Protein / creatinine ratio, urine   Result Value Ref Range    Creatinine, Ur 83 9 mg/dL    Protein Urine Random 9 mg/dL    Prot/Creat Ratio, Ur 0 11 (H) 0 00 - 0 10   Urinalysis with microscopic   Result Value Ref Range    Clarity, UA Clear     Color, UA Yellow     Specific [place], UA 1 017 1 003 - 1 030    pH, UA 6 0 4 5 - 8 0    Glucose, UA Negative Negative mg/dl    Ketones, UA Negative Negative mg/dl    Blood, UA Negative Negative    Protein, UA Negative Negative mg/dl    Nitrite, UA Negative Negative    Bilirubin, UA Negative Negative    Urobilinogen, UA 0 2 0 2, 1 0 E U /dl E U /dl    Leukocytes, UA Moderate (A) Negative    WBC, UA 10-20 (A) None Seen, 0-5, 5-55, 5-65 /hpf    RBC, UA None Seen None Seen, 0-5 /hpf    Hyaline Casts, UA None Seen None Seen /lpf    Bacteria, UA Occasional None Seen, Occasional /hpf    Epithelial Cells Moderate (A) None Seen, Occasional /hpf   CBC and differential   Result Value Ref Range    WBC 6 65 4 31 - 10 16 Thousand/uL    RBC 3 84 3 81 - 5 12 Million/uL    Hemoglobin 10 9 (L) 11 5 - 15 4 g/dL    Hematocrit 35 0 34 8 - 46 1 %    MCV 91 82 - 98 fL    MCH 28 4 26 8 - 34 3 pg    MCHC 31 1 (L) 31 4 - 37 4 g/dL    RDW 15 9 (H) 11 6 - 15 1 %    MPV 12 0 8 9 - 12 7 fL    Platelets 491 733 - 002 Thousands/uL    nRBC 0 /100 WBCs    Neutrophils Relative 70 43 - 75 %    Immat GRANS % 1 0 - 2 %    Lymphocytes Relative 14 14 - 44 %    Monocytes Relative 9 4 - 12 %    Eosinophils Relative 5 0 - 6 %    Basophils Relative 1 0 - 1 %    Neutrophils Absolute 4 71 1 85 - 7 62 Thousands/µL    Immature Grans Absolute 0 04 0 00 - 0 20 Thousand/uL    Lymphocytes Absolute 0 93 0 60 - 4 47 Thousands/µL    Monocytes Absolute 0 59 0 17 - 1 22 Thousand/µL    Eosinophils Absolute 0 35 0 00 - 0 61 Thousand/µL    Basophils Absolute 0 03 0 00 - 0 10 Thousands/µL   D-dimer, quantitative   Result Value Ref Range    D-Dimer, Quant 1,186 (H) <500 ng/ml (FEU)

## 2019-02-13 NOTE — PATIENT INSTRUCTIONS
1) weigh yourself daily as you are doing - call if you go above 222 lbs  2) labwork this Friday  3) take 1/2 tablets of bumex today, Thursday, and Friday of this week; then go back to one tablet daily

## 2019-02-13 NOTE — LETTER
February 13, 2019     Alfreda Lane MD  [address]  [place] [address]    Patient: Ayla Bender   YOB: 1940   Date of Visit: 2/13/2019       Dear Dr Flvaio Nava:    Thank you for referring Ghada Larson to me for evaluation  Below are my notes for this consultation  If you have questions, please do not hesitate to call me  I look forward to following your patient along with you  Sincerely,        Kimber Manzanares MD        CC: No Recipients  Kimber Manzanares MD  2/13/2019 11:25 AM  Sign at close encounter  [address] [facility] 77 y o  female MRN: [id number]  2/13/2019    Reason for Visit: CKD III    ASSESSMENT and PLAN:    I had the pleasure of seeing Ms Schuyler Nava today in the renal clinic for the continued management of CKD III  Since our last visit, there has been no ER visits or hospitalizations  Patient continues to have SOB  23-year-old female with a past medical history of chronic kidney disease, venous stasis, HTN, hypothyroidism, lumbar canal stenosis, Anxiety, GERD, neuropathy, Gout, EF 89-46%, Grade 2 diastolic dysfunction who presents for follow up for CKD  To note, patient's  has now passed away in August      1) CKD - Prior year had nl Cr prior to aug 2016 and then had SHABANA during diarrhea episode  To note, patient also has a long standing history of HTN  Creatinine in 2013, 0 9 mg/dL  Cr early 2016 was 0 80-0 9 mg/dL; then increased starting in august 2016 to 2 3 mg/dL and has fluctuated since  CT scan in 2016, kidneys appearing normal at that time  UA in august was bland       Etiology of CKD may be long standing HTN and ATN  Baseline Cr ~ 1 1 -1 3 mg/dL, but recently in October the creatinine was rising to 1 7 mg/dL and this was thought to be a new baseline; but Cr is rising to 2 04   Pt denies cough, n/v       - Urine eos 0%;   - A1c 5 6% in nov  - UA 10-20  -U PCR stable  0  11  -SPEP unrevealing  -UPEP unrevealing  - C3, C4 unrevealing  - Renal u/s with R 10 3 cm, L 10 4 cm, renal cortex 1 cm b/l; both kidneys slightly reduced in size; lower pole of R kidney is non obstructing calculus  - Pt follows with Urology team for nephrolithiasis  - No NSAIDs, the patient is not currently taking NSAID  -nuc stress test per Cardiology in Jan unrevealing  - renal u/s unrevealing with exception of renal cortical atrophy; but also 6 mm non shadowing calculus  -BMP Friday  - lower bumetanide to 1 mg daily for 3 days and check labs on Friday then inc to 2 mg daily     Overall, patient's renal function is worsening  Patient does not have worsening proteinuria  Renal ultrasound is relatively unrevealing with the exception of chronic changes  And small stone  I would be concern for intravascular volume depletion  Lungs are clear to exam   There is edema on the lower extremities and this could be related to other issues such as right-sided failure?    Patient has a negative stress test   Echocardiogram is pending  Duplex of the lower extremities are pending  Therefore for now I have lowered the did Bumetanide  Advised the patient to monitor weights closely and call if the rise  Patient did feel better with increase in diuretics end of last year which does suggest some volume component, but clinically the patient does not appear overtly volume overloaded with the exception of lower extremity edema currently   -this may be progression of renal dysfunction, but this appears rapid and therefore we are completing the above tests in addition to pulmonary team      2) SOB - Volume - follows with Cardiology     -see above  - saw the Pulmonary team early feb as SOB has not improved  Awaiting ECHO and duplex of LE as edema still present and elevated D Dimer  - weight stable at home at 218 lbs   - may need to consider abd u/s  But abd soft  Prior CT in 2016 without ascites   Most recent LFTs nl     3) HTN -      - continue amlodipine/olmesartan; bumex, spironolactone, Propranolol  -may need to hold amlodipine if blood pressures are less than 568 systolic     4) hypothyroid - as per Primary Care Physician     5) HPL - Primary Care following       6) Electrolytes - stable     7) MBD -Vit D 48 appropriate  Phos 3 7 -- appropriate       - PTH rising 129 - monitor for now  - recheck next visit as last check was in october     8) gout -      - on allopurinol - may need to reduce dose if Cr rises or GFR < 20  -patient only takes colchicine up to 3 doses max when has a gout flare     9) back pain - follows with Pain management     10) Colonoscopy in feb with internal hemorrhoids and polyp removed       11) alk phos elevation     - stable, but still elevated  - LFTs nl in may  - was started on allopurinol since last check when was normal  Could be related to allopurinol     12) Anemia - Hb stable     It was a pleasure evaluating your patient in the office today  Thank you for allowing our team to participate in the care of Ms Leydi Gregory  Please do not hesitate to contact our team if further issues/questions shall arise in the interim  No problem-specific Assessment & Plan notes found for this encounter  HPI:    Pt denies complaints  Denies n/v  Chronic knee pain  Edema stable  Weight stable at home 118 lbs per patient  PATIENT INSTRUCTIONS:    Patient Instructions   1) weigh yourself daily as you are doing - call if you go above 222 lbs  2) labwork this Friday  3) take 1/2 tablets of bumex today, Thursday, and Friday of this week; then go back to one tablet daily        OBJECTIVE:  Current Weight: Weight - Scale: 103 kg (226 lb 3 2 oz)  Vitals:    02/13/19 0958   BP: 124/62   BP Location: Right arm   Patient Position: Sitting   Cuff Size: Large   Pulse: 60   Weight: 103 kg (226 lb 3 2 oz)   Height: 4' 11 5" (1 511 m)    Body mass index is 44 92 kg/m²  REVIEW OF SYSTEMS:    Review of Systems   Constitutional: Negative  Negative for fatigue  HENT: Negative  Eyes: Negative  Respiratory: Negative  Negative for shortness of breath  Cardiovascular: Negative  Negative for leg swelling  Gastrointestinal: Negative  Endocrine: Negative  Genitourinary: Negative  Negative for difficulty urinating  Musculoskeletal: Negative  Skin: Negative  Allergic/Immunologic: Negative  Neurological: Negative  Hematological: Negative  Psychiatric/Behavioral: Negative  All other systems reviewed and are negative  PHYSICAL EXAM:      Physical Exam   Constitutional: She is oriented to person, place, and time  She appears well-developed and well-nourished  No distress  HENT:   Head: Normocephalic and atraumatic  Mouth/Throat: No oropharyngeal exudate  Eyes: Conjunctivae are normal  Right eye exhibits no discharge  Left eye exhibits no discharge  No scleral icterus  Neck: Normal range of motion  Neck supple  No JVD present  Cardiovascular: Normal rate and regular rhythm  Exam reveals no gallop and no friction rub  No murmur heard  Pulmonary/Chest: Effort normal and breath sounds normal  No respiratory distress  She has no wheezes  She has no rales  Abdominal: Soft  Bowel sounds are normal  She exhibits no distension  There is no tenderness  There is no rebound  Musculoskeletal: She exhibits edema  She exhibits no tenderness or deformity  2 + LE edema b/l   Neurological: She is alert and oriented to person, place, and time  Coordination normal    Skin: Skin is warm and dry  No rash noted  She is not diaphoretic  No erythema  No pallor  Psychiatric: She has a normal mood and affect  Her behavior is normal  Judgment and thought content normal    Nursing note and vitals reviewed        Medications:    Current Outpatient Medications:     allopurinol (ZYLOPRIM) 300 mg tablet, Take 1 tablet (300 mg total) by mouth every morning, Disp: 90 tablet, Rfl: 1    amlodipine-olmesartan (BLACK) 10-40 MG, TAKE 1 TABLET ONE TIME DAILY (Patient taking differently: pt takes 5-20mg ,1 tab daily ), Disp: 90 tablet, Rfl: 3    aspirin 81 MG tablet, Take 81 mg by mouth every morning  , Disp: , Rfl:     bumetanide (BUMEX) 2 mg tablet, Take 0 5 tablets (1 mg total) by mouth 2 (two) times a day (Patient taking differently: Take 2 mg by mouth daily Take one tablet daily; take extra 1/2 pill in the afternoon as needed for swelling ), Disp: 90 tablet, Rfl: 0    cholecalciferol (VITAMIN D3) 1,000 units tablet, Take 1 tablet by mouth daily, Disp: , Rfl:     clobetasol (TEMOVATE) 0 05 % ointment, Apply topically 2 (two) times a day, Disp: 30 g, Rfl: 1    colchicine (COLCRYS) 0 6 mg tablet, Take 0 6 mg by mouth daily as needed Only in gout flare   , Disp: , Rfl:     Crisaborole (EUCRISA) 2 % OINT, Apply twice daily, Disp: 60 g, Rfl: 0    diclofenac sodium (VOLTAREN) 1 %, Apply 2 g topically 4 (four) times a day, Disp: 100 g, Rfl: 0    gabapentin (NEURONTIN) 300 mg capsule, TAKE 1 CAPSULE EVERY DAY AT BEDTIME, Disp: 90 capsule, Rfl: 0    halobetasol (ULTRAVATE) 0 05 % cream, , Disp: , Rfl: 2    hydrocortisone 2 5 % cream, Apply topically 2 (two) times a day, Disp: 30 g, Rfl: 2    hydrOXYzine HCL (ATARAX) 25 mg tablet, TK 1 T PO QHS, Disp: , Rfl: 2    levothyroxine 112 mcg tablet, Take 1 tablet (112 mcg total) by mouth daily, Disp: 90 tablet, Rfl: 1    Omega-3-acid Ethyl Esters & D3 1 & 1000 GM & UNIT KIT, Take 2 capsules by mouth 2 (two) times a day, Disp: , Rfl:     omeprazole (PriLOSEC) 20 mg delayed release capsule, Take 1 capsule (20 mg total) by mouth every morning, Disp: 90 capsule, Rfl: 1    propranolol (INDERAL) 80 mg tablet, TAKE 1/2 TABLET EVERY 12 HOURS DAILY (Patient taking differently: take one tablet daily), Disp: 90 tablet, Rfl: 3    spironolactone (ALDACTONE) 25 mg tablet, Take 1 tablet (25 mg total) by mouth daily, Disp: 90 tablet, Rfl: 1    Laboratory Results:  Results from last 7 days   Lab Units 02/08/19  0813   WBC Thousand/uL 6  65   HEMOGLOBIN g/dL 10 9*   HEMATOCRIT % 35 0   PLATELETS Thousands/uL 175   POTASSIUM mmol/L 4 4   CHLORIDE mmol/L 105   CO2 mmol/L 24   BUN mg/dL 77*   CREATININE mg/dL 2 04*   CALCIUM mg/dL 9 3       Results for orders placed or performed in visit on 23/57/85   Basic metabolic panel   Result Value Ref Range    Sodium 138 136 - 145 mmol/L    Potassium 4 4 3 5 - 5 3 mmol/L    Chloride 105 100 - 108 mmol/L    CO2 24 21 - 32 mmol/L    ANION GAP 9 4 - 13 mmol/L    BUN 77 (H) 5 - 25 mg/dL    Creatinine 2 04 (H) 0 60 - 1 30 mg/dL    Glucose, Fasting 105 (H) 65 - 99 mg/dL    Calcium 9 3 8 3 - 10 1 mg/dL    eGFR 23 ml/min/1 73sq m   Protein / creatinine ratio, urine   Result Value Ref Range    Creatinine, Ur 83 9 mg/dL    Protein Urine Random 9 mg/dL    Prot/Creat Ratio, Ur 0 11 (H) 0 00 - 0 10   Urinalysis with microscopic   Result Value Ref Range    Clarity, UA Clear     Color, UA Yellow     Specific [place], UA 1 017 1 003 - 1 030    pH, UA 6 0 4 5 - 8 0    Glucose, UA Negative Negative mg/dl    Ketones, UA Negative Negative mg/dl    Blood, UA Negative Negative    Protein, UA Negative Negative mg/dl    Nitrite, UA Negative Negative    Bilirubin, UA Negative Negative    Urobilinogen, UA 0 2 0 2, 1 0 E U /dl E U /dl    Leukocytes, UA Moderate (A) Negative    WBC, UA 10-20 (A) None Seen, 0-5, 5-55, 5-65 /hpf    RBC, UA None Seen None Seen, 0-5 /hpf    Hyaline Casts, UA None Seen None Seen /lpf    Bacteria, UA Occasional None Seen, Occasional /hpf    Epithelial Cells Moderate (A) None Seen, Occasional /hpf   CBC and differential   Result Value Ref Range    WBC 6 65 4 31 - 10 16 Thousand/uL    RBC 3 84 3 81 - 5 12 Million/uL    Hemoglobin 10 9 (L) 11 5 - 15 4 g/dL    Hematocrit 35 0 34 8 - 46 1 %    MCV 91 82 - 98 fL    MCH 28 4 26 8 - 34 3 pg    MCHC 31 1 (L) 31 4 - 37 4 g/dL    RDW 15 9 (H) 11 6 - 15 1 %    MPV 12 0 8 9 - 12 7 fL    Platelets 328 837 - 283 Thousands/uL    nRBC 0 /100 WBCs    Neutrophils Relative 70 43 - 75 %    Immat GRANS % 1 0 - 2 %    Lymphocytes Relative 14 14 - 44 %    Monocytes Relative 9 4 - 12 %    Eosinophils Relative 5 0 - 6 %    Basophils Relative 1 0 - 1 %    Neutrophils Absolute 4 71 1 85 - 7 62 Thousands/µL    Immature Grans Absolute 0 04 0 00 - 0 20 Thousand/uL    Lymphocytes Absolute 0 93 0 60 - 4 47 Thousands/µL    Monocytes Absolute 0 59 0 17 - 1 22 Thousand/µL    Eosinophils Absolute 0 35 0 00 - 0 61 Thousand/µL    Basophils Absolute 0 03 0 00 - 0 10 Thousands/µL   D-dimer, quantitative   Result Value Ref Range    D-Dimer, Quant 1,186 (H) <500 ng/ml (FEU)

## 2019-02-14 ENCOUNTER — OFFICE VISIT (OUTPATIENT)
Dept: PODIATRY | Facility: CLINIC | Age: 79
End: 2019-02-14
Payer: MEDICARE

## 2019-02-14 VITALS
SYSTOLIC BLOOD PRESSURE: 134 MMHG | HEART RATE: 64 BPM | DIASTOLIC BLOOD PRESSURE: 68 MMHG | BODY MASS INDEX: 44.37 KG/M2 | WEIGHT: 226 LBS | HEIGHT: 60 IN | RESPIRATION RATE: 17 BRPM

## 2019-02-14 DIAGNOSIS — I70.209 ATHEROSCLEROSIS OF ARTERIES OF EXTREMITIES (HCC): ICD-10-CM

## 2019-02-14 DIAGNOSIS — M79.672 PAIN IN BOTH FEET: Primary | ICD-10-CM

## 2019-02-14 DIAGNOSIS — M79.671 PAIN IN BOTH FEET: Primary | ICD-10-CM

## 2019-02-14 DIAGNOSIS — L84 CORNS: ICD-10-CM

## 2019-02-14 PROCEDURE — 11056 PARNG/CUTG B9 HYPRKR LES 2-4: CPT | Performed by: PODIATRIST

## 2019-02-14 NOTE — PROGRESS NOTES
Procedures   Foot Exam      History of Present Illness   HPI: Patient complains of pain in her feet with ambulation  She is pain around the big toe joints  There is no history of trauma  Today the patient is concerned with her swollen legs  She does not have pain in her legs, however, they are itchy and red  She suffers from edema  She does take water pill  Patient is now been diagnosed with renal compromise       Review of Systems        Constitutional: No fever, no chills, feels well, no tiredness, no recent weight gain or loss       Eyes: No complaints of eyesight problems, no red eyes       ENT: no loss of hearing, no nosebleeds, no sore throat       Cardiovascular: No complaints of chest pain, no palpitations, no leg claudication or lower extremity edema       Respiratory: no compliants of shortness of breath, no wheezing, no cough       Gastrointestinal: no complaints of abdominal pain, no constipation, no nausea or diarrhea, no vomiting, no bloody stools       Genitourinary: no complaints of dysuria, no incontinence       Musculoskeletal: arthralgias,-- limb pain-- and-- myalgias, but-- as noted in HPI       Integumentary: no complaints of skin rash or lesion, no itching or dry skin, no skin wounds       Neurological: numbness-- and-- tingling, but-- no complaints of headache, no confusion, no numbness or tingling, no dizziness-- and-- as noted in HPI       Endocrine: No complaints of muscle weakness, no feelings of weakness, no frequent urination, no excessive thirst-- and-- as noted in HPI   feelings of weakness      Psychiatric: No suicidal thoughts, no anxiety, no feelings of depression       Active Problems   1  Abdominal pain (789 00) (R10 9)   2  Acute bronchitis (466 0) (J20 9)   3  Acute on chronic diastolic congestive heart failure (428 33,428 0) (I50 33)   4  Ankle pain, unspecified laterality   5  Arthropathy of knee (716 96) (M17 10)   6  Atherosclerosis of arteries of extremities (440 20) (I70 209)   7  Benign essential hypertension (401 1) (I10)   8  Breast pain (611 71) (N64 4)   9  Bunion, unspecified laterality   10  Callus (700) (L84)   11  Cellulitis (682 9) (L03 90)   12  Chronic low back pain (724 2,338 29) (M54 5,G89 29)   13  Chronic reflux esophagitis (530 11) (K21 0)   14  Chronic venous insufficiency (459 81) (I87 2)   15  CKD (chronic kidney disease), stage III (585 3) (N18 3)   74  YYEQVUNO systolic and diastolic HF (heart failure) (428 40) (I50 40)   17  Dehydration (276 51) (E86 0)   18  Dermatitis (692 9) (L30 9)   19  Difficulty in walking (719 7) (R26 2)   20  Discoloration of nail (703 8) (L60 8)   21  Diverticulitis of colon (562 11) (K57 32)   22  Dizziness (780 4) (R42)   23  Dyspnea on exertion (786 09) (R06 09)   24  Dystrophic nail (703 8) (L60 3)   25  Dysuria (788 1) (R30 0)   26  Edema (782 3) (R60 9)   27  Electrolyte or fluid disorder (276 9) (E87 8)   28  Elevated triglycerides with high cholesterol (272 2) (E78 2)   29  Encounter for screening for cardiovascular disorders (V81 2) (Z13 6)   30  Encounter for screening mammogram for breast cancer (V76 12) (Z12 31)   31  Esophageal reflux (530 81) (K21 9)   32  Flu vaccine need (V04 81) (Z23)   33  Flu vaccine need (V04 81) (Z23)   34  Gout (274 9) (M10 9)   35  Headache (784 0) (R51)   36  Hiatal hernia (553 3) (K44 9)   37  Hyperlipemia (272 4) (E78 5)   38  Hypertension (401 9) (I10)   39  Hyperuricemia (790 6) (E79 0)   40  Hypothyroidism (244 9) (E03 9)   41  Infectious diarrhea (009 2) (A09)   42  Knee pain (719 46) (M25 569)   43  Leg swelling (729 81) (M79 89)   44  Localized osteoarthritis of knees, bilateral (715 36) (M17 0)   45  Lumbar canal stenosis (724 02) (M48 061)   46  Lumbar disc herniation with radiculopathy (722 10) (M51 16)   47  Lumbar radiculopathy (724 4) (M54 16)   48  Lumbar stenosis with neurogenic claudication (724 03) (M48 062)   49  Lymphedema (457 1) (I89 0)   50  Medicare annual wellness visit, initial (V70 0) (Z00 00)   51  Medicare annual wellness visit, subsequent (V70 0) (Z00 00)   52  Morbid obesity with body mass index of 40 0-44 9 in adult (278 01,V85 41)      (E66 01,Z68 41)   53  Nails Onychauxis (703 8)   54  Nausea (787 02) (R11 0)   55  Need for pneumococcal vaccination (V03 82) (Z23)   56  Need for prophylactic vaccination and inoculation against influenza (V04 81) (Z23)   57  Need for shingles vaccine (V04 89) (Z23)   58  Nephrolithiasis (592 0) (N20 0)   59  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)   60  Obstructive sleep apnea (327 23) (G47 33)   61  Onychomycosis (110 1) (B35 1)   62  Orthopnea (786 02) (R06 01)   63  Osteoarthritis, localized, knee (715 36) (M17 10)   64  Osteoporosis screening (V82 81) (Z13 820)   65  Other specified anxiety disorders (300 09) (F41 8)   66  Other specified inflammatory spondylopathies, 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) (M46 80)   67  Overweight (278 02) (E66 3)   68  Pain in both feet (729 5) (M79 671,M79 672)   69  Pain in wrist joint (719 43) (M25 539)   70  Palpitations (785 1) (R00 2)   71  Pes planus, unspecified laterality (734) (M21 40)   72  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)   73  Pseudogout of left wrist (816 41,473 60) (M11 232)   74  Renal disorder (593 9) (N28 9)   75  Rupture of popliteal cyst (727 51) (M66 0)   76  Screening for colon cancer (V76 51) (Z12 11)   77  Screening for diabetes mellitus (DM) (V77 1) (Z13 1)   78  Screening for genitourinary condition (V81 6) (Z13 89)   79  Screening for malignant neoplasm of breast (V76 10) (Z12 31)   80  Short unilateral neuralgiform headache, conjunctival injection/tearing (339 05) (G44 059)   81  Somnolence, daytime (780 54) (R40 0)   82  Spinal stenosis of lumbar region (724 02) (M48 061)   83  Tarsal tunnel syndrome (355 5) (G57 50)   84  Tenderness in limb (729 5) (M79 609)   85  Tinea pedis (110 4) (Z72 4)   86  Umbilical hernia (321 6) (K42 9)   87  Visit for screening mammogram (V76 12) (Z12 31)     Past Medical History    · Benign essential hypertension (401 1) (I10)   · Depression screen (V79 0) (Z13 89)   · Hiatal hernia (553 3) (K44 9)   · History of abdominal pain (V13 89) (Z60 880)   · History of abdominal pain (V13 89) (D62 931)   · History of arthritis (V13 4) (Z87 39)   · History of backache (V13 59) (Z87 39)   · History of cataract (V12 49) (Z86 69)   · History of eczema (V13 3) (Z87 2)   · History of heart failure (V12 59) (Z86 79)   · History of hepatitis (V12 09) (Z86 19)   · History of onychomycosis (V12 09) (Z86 19)   · History of sleep apnea (V13 89) (Z86 69)   · History of Orthopnea (786 02) (R06 01)     The active problems and past medical history were reviewed and updated today       Surgical History    · History of Complete Colonoscopy   · History of Hysterectomy   · History of Incisional Hernia Repair - Incarcerated   · History of Knee Surgery     The surgical history was reviewed and updated today        Family History   Mother    · Family history of Coronary artery disease   · Family history of arthritis (V17 7) (Z82 61)   · Denied: Family history of depression   · Family history of diabetes mellitus (V18 0) (Z83 3)   · Family history of hypertension (V17 49) (Z82 49)   · Denied: Family history of substance abuse  Father    · Denied: Family history of depression   · Family history of hypertension (V17 49) (Z82 49)   · Denied: Family history of substance abuse  Sibling    · Denied: Family history of depression   · Denied: Family history of substance abuse  Family History    · Family history of arthritis (V17 7) (Z82 61)     The family history was reviewed and updated today        Social History    · Denied: History of Alcohol Use (History)   · Daily Coffee Consumption (___ Cups/Day)   · Lack of exercise (V69 0) (Z72 3)   ·    · Never a smoker   · Never a smoker   · Sleeps 6 -7 hours a day  The social history was reviewed and updated today   The social history was reviewed and is unchanged       Current Meds    1  Allopurinol 300 MG Oral Tablet; take one tablet by mouth every day;     Therapy: 13OFJ1075 to (Evaluate:53Nbs5846)  Requested for: 57VHY7861; Last     Rx:61Ehn2070 Ordered   2  Amlodipine-Olmesartan 5-20 MG Oral Tablet; TAKE 1 TABLET ONCE DAILY;     Therapy: 10LGT5087 to (Isaias Flores)  Requested for: 64Ojp4747; Last     Rx:98Azn6245 Ordered   3  Aspirin 81 MG Oral Tablet Chewable;     Therapy: (Recorded:38Cpr2283) to Recorded   4  Bumetanide 2 MG Oral Tablet; TAKE 1 TABLET DAILY;     Therapy: 66JQL6530 to (Evaluate:46Isc6986)  Requested for: 01OOU9814; Last     Rx:30Huh7236 Ordered   5  Colchicine 0 6 MG Oral Tablet; TAKE 1 TABLET DAILY AS DIRECTED;     Therapy: 09BGI9794 to (Evaluate:28Ytt4861)  Requested for: 13HHF4663; Last     Rx:98Zzv4158 Ordered   6  Gabapentin 300 MG Oral Capsule; TAKE 1 CAPSULE AT BEDTIME;     Therapy: 33VOI8150 to (Evaluate:48Dqc9614)  Requested for: 26TOJ0512; Last     Rx:43Qyg8711 Ordered   7  Levothyroxine Sodium 100 MCG Oral Tablet; 1 TAb PO Daily in AM;     Therapy: 66OZP9131 to (Last Rx:69Lfo5338)  Requested for: 94RQN4231 Ordered   8  Omega-3-acid Ethyl Esters 1 GM Oral Capsule; take 2 capsules by mouth twice a day;     Therapy: 12AWE0097 to (Evaluate:55Rcq8477)  Requested for: 43Yrf6157; Last     Rx:52Fje1875 Ordered   9  Omeprazole 20 MG Oral Capsule Delayed Release; TAKE 1 CAPSULE EVERY DAY AS     DIRECTED  BY  PHYSICIAN;     Therapy: 38GYT0121 to (Evaluate:50Ecc0119)  Requested for: 33SSJ0204; Last     Rx:12Mkr8526 Ordered   10  Propranolol HCl - 80 MG Oral Tablet; TAKE 1/2 TABLET EVERY 12 HOURS DAILY;      Therapy: 71AEW1032 to (Last Rx:40Yzf0450)  Requested for: 24Rrm8383 Ordered   11  Spironolactone 25 MG Oral Tablet; TAKE 1 TABLET DAILY;      Therapy: 89PNJ7669 to Recorded   12  Vitamin D3 TABS; Take 1 tablet daily;      Therapy: (Recorded:02Jun2017) to Recorded   13  Zoster (Zostavax);  INJECT 0 5  ML Subcutaneous;    Therapy: 58PJW2556 to (Last Rx:10Amb2374) Ordered     The medication list was reviewed and updated today       Allergies   1  No Known Drug Allergies     Physical Exam   Left Foot: Appearance: Normal except as noted: excessive pronation-- and-- pes planus     Right Foot: Appearance: Normal except as noted: excessive pronation-- and-- pes planus  Tenderness: None except the calcaneous,-- medial calcaneous-- and-- insertion of the plantar fascia     Left Ankle: Appearance: Normal except ecchymosis-- and-- swelling laterally  ROM: limited ROM in all planes    Right Ankle: ROM: limited ROM in all planes Motor: diffuse weakness     Neurological Exam: performed  Light touch was intact bilaterally  Vibratory sensation was intact bilaterally  Response to monofilament test was intact bilaterally  Deep tendon reflexes: patellar reflex present bilaterally-- and-- achilles reflex present bilaterally     Vascular Exam: performed Dorsalis pedis pulses were 1/4 bilaterally  Posterior tibial pulses were 1/4 bilaterally  Elevation Pallor: diminished bilaterally  Capillary refill time was greater than 3 seconds bilaterally-- and-- Q9 findings bilateral  Negative digital hair noted  Positive abnormal cooling bilateral  Edema: moderate bilaterally-- and-- 6/7 pitting edema  Negative Homans sign     Toenails: All of the toenails were elongated,-- hypertrophied,-- discolored-- and-- Mycotic with onychogryphosis  Note is made of bilateral tinea pedis in moccasin foot  Distribution          Socks and shoes removed, Right Foot Findings: swollen, erythematous and dry       The sensory exam showed diminished vibratory sensation at the level of the toes  Diminished tactile sensation with monofilament testing throughout the right foot       Socks and shoes removed, Left Foot Findings: swollen, erythematous and dry       The sensory exam showed diminished vibratory sensation at the level of the toes   Diminished tactile sensation with monofilament testing throughout the left foot      Capillary refills findings on the right were delayed in the toes       Pulses:      1+ in the posterior tibialis on the right      1+ in the dorsalis pedis on the right       Capillary refills findings on the left were delayed in the toes       Pulses:      1+ in the posterior tibialis on the left      1+ in the dorsalis pedis on the left       Assign Risk Category: 2: Loss of protective sensation with or without weakness, deformity, callus, pre-ulcer, or history of ulceration  High risk     Hyperkeratosis: present on both first toes,-- present on both first sub metatarsals-- and-- Bilateral plantar moccasin tinea pedis noted     Shoe Gear Evaluation: performed ()  Recommendation(s): SAS style  Patient's shoes and socks removed  Assign Risk Category:  Deformity present; Loss of protective sensation; Weak pulses       Risk: 2         Procedure   All mycotic nails debrided today  Bilateral pre-ulcerative lesions debrided today   Procedures performed without pain or complication

## 2019-02-26 ENCOUNTER — HOSPITAL ENCOUNTER (OUTPATIENT)
Dept: RADIOLOGY | Facility: HOSPITAL | Age: 79
Discharge: HOME | End: 2019-02-26
Attending: INTERNAL MEDICINE
Payer: MEDICARE

## 2019-02-26 ENCOUNTER — LAB (OUTPATIENT)
Dept: LAB | Facility: HOSPITAL | Age: 79
End: 2019-02-26
Attending: INTERNAL MEDICINE
Payer: MEDICARE

## 2019-02-26 ENCOUNTER — TELEPHONE (OUTPATIENT)
Dept: NEPHROLOGY | Facility: CLINIC | Age: 79
End: 2019-02-26

## 2019-02-26 ENCOUNTER — HOSPITAL ENCOUNTER (OUTPATIENT)
Dept: NON INVASIVE DIAGNOSTICS | Facility: HOSPITAL | Age: 79
Discharge: HOME | End: 2019-02-26
Payer: MEDICARE

## 2019-02-26 ENCOUNTER — TRANSCRIBE ORDERS (OUTPATIENT)
Dept: ADMINISTRATIVE | Facility: HOSPITAL | Age: 79
End: 2019-02-26

## 2019-02-26 DIAGNOSIS — N17.9 AKI (ACUTE KIDNEY INJURY) (HCC): ICD-10-CM

## 2019-02-26 DIAGNOSIS — I10 BENIGN ESSENTIAL HTN: Primary | ICD-10-CM

## 2019-02-26 DIAGNOSIS — R06.02 SOB (SHORTNESS OF BREATH): ICD-10-CM

## 2019-02-26 DIAGNOSIS — I10 BENIGN ESSENTIAL HTN: ICD-10-CM

## 2019-02-26 DIAGNOSIS — R06.02 SHORTNESS OF BREATH: ICD-10-CM

## 2019-02-26 DIAGNOSIS — E87.1 HYPONATREMIA: ICD-10-CM

## 2019-02-26 LAB
ANION GAP SERPL CALCULATED.3IONS-SCNC: 13 MMOL/L (ref 4–13)
BUN SERPL-MCNC: 62 MG/DL (ref 5–25)
CALCIUM SERPL-MCNC: 9.8 MG/DL (ref 8.3–10.1)
CHLORIDE SERPL-SCNC: 112 MMOL/L (ref 100–108)
CO2 SERPL-SCNC: 28 MMOL/L (ref 21–32)
CREAT SERPL-MCNC: 1.8 MG/DL (ref 0.6–1.3)
GFR SERPL CREATININE-BSD FRML MDRD: 26 ML/MIN/1.73SQ M
GLUCOSE SERPL-MCNC: 74 MG/DL (ref 65–140)
POTASSIUM SERPL-SCNC: 5 MMOL/L (ref 3.5–5.3)
SODIUM SERPL-SCNC: 153 MMOL/L (ref 136–145)

## 2019-02-26 PROCEDURE — 80048 BASIC METABOLIC PNL TOTAL CA: CPT

## 2019-02-26 PROCEDURE — 36415 COLL VENOUS BLD VENIPUNCTURE: CPT

## 2019-02-26 PROCEDURE — 93306 TTE W/DOPPLER COMPLETE: CPT

## 2019-02-26 PROCEDURE — 71046 X-RAY EXAM CHEST 2 VIEWS: CPT

## 2019-02-26 RX ORDER — LOSARTAN POTASSIUM 25 MG/1
25 TABLET ORAL DAILY
Qty: 30 TABLET | Refills: 3 | Status: SHIPPED | OUTPATIENT
Start: 2019-02-26 | End: 2019-02-26 | Stop reason: SDUPTHER

## 2019-02-26 NOTE — TELEPHONE ENCOUNTER
Spoke with the patient over the phone  Sodium level 153  Creatinine relatively stable  Patient states a few days ago felt dizzy  This has resolved  Blood pressure staying less than 120 currently    Advised the patient to go to the hospital but the patient states that she prefers if we can manage this as an outpatient 1st    -inc fluid intake to 2 L  - hold spironolactone  - hold baldemar  - start losartan if BP rises above 130  - keep on bumex, but lower dose of 1 mg daily  - BMP on Thursday  - orders place

## 2019-02-27 RX ORDER — LOSARTAN POTASSIUM 25 MG/1
TABLET ORAL
Qty: 90 TABLET | Refills: 3 | Status: SHIPPED | OUTPATIENT
Start: 2019-02-27 | End: 2019-05-22 | Stop reason: SDUPTHER

## 2019-02-28 ENCOUNTER — APPOINTMENT (OUTPATIENT)
Dept: LAB | Facility: CLINIC | Age: 79
End: 2019-02-28
Payer: MEDICARE

## 2019-02-28 ENCOUNTER — OFFICE VISIT (OUTPATIENT)
Dept: FAMILY MEDICINE CLINIC | Facility: CLINIC | Age: 79
End: 2019-02-28
Payer: MEDICARE

## 2019-02-28 ENCOUNTER — TELEPHONE (OUTPATIENT)
Dept: NEPHROLOGY | Facility: CLINIC | Age: 79
End: 2019-02-28

## 2019-02-28 VITALS
HEIGHT: 60 IN | OXYGEN SATURATION: 97 % | BODY MASS INDEX: 44.14 KG/M2 | TEMPERATURE: 98.2 F | HEART RATE: 70 BPM | DIASTOLIC BLOOD PRESSURE: 64 MMHG | WEIGHT: 224.8 LBS | RESPIRATION RATE: 20 BRPM | SYSTOLIC BLOOD PRESSURE: 126 MMHG

## 2019-02-28 DIAGNOSIS — E66.01 MORBID OBESITY WITH BODY MASS INDEX OF 40.0-44.9 IN ADULT (HCC): ICD-10-CM

## 2019-02-28 DIAGNOSIS — E87.1 HYPONATREMIA: ICD-10-CM

## 2019-02-28 DIAGNOSIS — N18.30 CKD (CHRONIC KIDNEY DISEASE), STAGE III (HCC): ICD-10-CM

## 2019-02-28 DIAGNOSIS — I50.33 ACUTE ON CHRONIC DIASTOLIC CONGESTIVE HEART FAILURE (HCC): ICD-10-CM

## 2019-02-28 DIAGNOSIS — F41.9 ANXIETY: Primary | ICD-10-CM

## 2019-02-28 LAB
ANION GAP SERPL CALCULATED.3IONS-SCNC: 7 MMOL/L (ref 4–13)
BUN SERPL-MCNC: 44 MG/DL (ref 5–25)
CALCIUM SERPL-MCNC: 9.5 MG/DL (ref 8.3–10.1)
CHLORIDE SERPL-SCNC: 108 MMOL/L (ref 100–108)
CO2 SERPL-SCNC: 24 MMOL/L (ref 21–32)
CREAT SERPL-MCNC: 1.19 MG/DL (ref 0.6–1.3)
GFR SERPL CREATININE-BSD FRML MDRD: 44 ML/MIN/1.73SQ M
GLUCOSE SERPL-MCNC: 139 MG/DL (ref 65–140)
POTASSIUM SERPL-SCNC: 3.9 MMOL/L (ref 3.5–5.3)
SODIUM SERPL-SCNC: 139 MMOL/L (ref 136–145)

## 2019-02-28 PROCEDURE — 93306 TTE W/DOPPLER COMPLETE: CPT | Performed by: INTERNAL MEDICINE

## 2019-02-28 PROCEDURE — 36415 COLL VENOUS BLD VENIPUNCTURE: CPT

## 2019-02-28 PROCEDURE — 99214 OFFICE O/P EST MOD 30 MIN: CPT | Performed by: FAMILY MEDICINE

## 2019-02-28 PROCEDURE — 80048 BASIC METABOLIC PNL TOTAL CA: CPT

## 2019-02-28 RX ORDER — ALPRAZOLAM 0.25 MG/1
0.25 TABLET ORAL
Qty: 30 TABLET | Refills: 0 | Status: SHIPPED | OUTPATIENT
Start: 2019-02-28 | End: 2019-04-17

## 2019-02-28 NOTE — TELEPHONE ENCOUNTER
----- Message from Elizabeth Camacho MD sent at 2/28/2019 10:59 AM EST -----  Regarding: RE: [address] [facility] you are well  Thank you for update  Pt has CKD and has been progressively worsening recently  Most recently on 2/26 pt had Na of 153  Declined coming to hospital  So I advised her to drink 2 L of fluids, to keep bumex on board but at 1 mg, and to hold ARB/amlodipine and changed her to lower dose losartan and she was to have repeat labwork today after which could make changes  Agree, she has this persistent SOB that we are not able to get to the bottom of as of yet  - we are awaiting ECHO results still    Dr Sparks Pair - if you have other thoughts    MA team - can we call patient with following recs that I reviewed with patient the other day -      -inc fluid intake to 2 L (this is only temporary until labs return today)  - hold spironolactone  - hold baldemar  - start losartan if BP rises above 130  - keep on bumex, but lower dose of 1 mg daily  - BMP today    Thank you    elly  ----- Message -----  From: Amie Baldwin MD  Sent: 2/28/2019   9:59 AM  To: Manish Kelley MD, Elizabeth Camacho MD  Subject: PATIENT CARE COORDINATION                        Hello  I am writing to you regarding our patient Matt Plunkett, who lives by herself at home  Recently she has been evaluated and undergone a battery of investigations including cardiac testing, pulmonary evaluation and input from Nephrology regarding her chronic renal failure  Apparently patient is very confused about coordination of her care  She was recently advised by Nephrology to increase her fluid intake but unfortunately due to her chronic cardiac issues she has been advised fluid restriction  She is not sure as to how much fluid can she take safely  She continues to experience shortness of breath, but only on exertion  She denies any Shortness of breath at rest   She has only been taking levothyroxine   She stopped Patient mom needing an referral for an Occupational Therapist will like it to be faxed over to [facility] at [phone] propranolol  I would appreciate your input  If someone from your team can get back to her

## 2019-02-28 NOTE — PROGRESS NOTES
Subjective:      Patient ID: Sheree Higgins is a 77 y o  female  Anxiety   Presents for initial visit  Onset was 1 to 6 months ago  The problem has been unchanged  Symptoms include excessive worry, insomnia, nervous/anxious behavior, panic, restlessness and shortness of breath  Patient reports no chest pain, decreased concentration, depressed mood, dizziness, irritability, palpitations or suicidal ideas  Symptoms occur most days  The severity of symptoms is moderate  The symptoms are aggravated by medication (medical issues)  The quality of sleep is poor  Nighttime awakenings: occasional, one to two  Her past medical history is significant for CHF  (CRF) Past treatments include nothing  She has been experiencing anxiety ever since her    Patient states that she lives alone but she has a very supportive family, who lives a few blocks away from her  Patient has a history of congestive cardiac failure, recently evaluated by cardiologist the, nephrologist and pulmonologist for symptoms of shortness of breath  Patient states that she was called yesterday by her nephrologist's office and was advised to go to the hospital   She got very anxious since she did not want to go to the hospital   She had labs this morning per nephrologist and is waiting to hear from them  In the meantime she is really confused about the amount of fluid that she should be drinking on a daily basis because of her cardiac issues    I will try and coordinate this between her nephrologist and cardiologist     Past Medical History:   Diagnosis Date    Anxiety     Arthritis     joints    Cataract     Disease of thyroid gland     Eczema     GERD (gastroesophageal reflux disease)     Gout     Heart failure ([address] )     Hepatitis     Hiatal hernia     Hypertension     Onychomycosis     last assessed: 16    Orthopnea     resolved: 16    Sleep apnea     wears CPAP       Family History   Problem Relation Age of Onset    Diabetes Mother     Coronary artery disease Mother     Arthritis Mother     Hypertension Mother     Hypertension Father     Diabetes Brother     Diabetes Son     Arthritis Family        Past Surgical History:   Procedure Laterality Date    ABDOMINAL SURGERY          BREAST SURGERY Right     cyst removal    CARPAL TUNNEL RELEASE Left     CARPAL TUNNEL RELEASE Right     CATARACT EXTRACTION       SECTION  1960    x1    COLONOSCOPY N/A 2018    Procedure: COLONOSCOPY;  Surgeon: Jazmine Kam MD;  Location: [place] [address] GI LAB; Service: Gastroenterology    DILATION AND CURETTAGE OF UTERUS  1967    EYE SURGERY Left 2006    cataracts    HERNIA REPAIR      umbilical hernia    HYSTERECTOMY      INCISIONAL HERNIA REPAIR      incarcerated    KNEE ARTHROSCOPY Right     NH REMV CATARACT EXTRACAP,INSERT LENS Right 3/27/2017    Procedure: EXTRACTION EXTRACAPSULAR CATARACT PHACO INTRAOCULAR LENS (IOL); Surgeon: Mckay Oneill MD;  Location: [facility] MAIN OR;  Service: Ophthalmology        reports that she has never smoked  She has never used smokeless tobacco  She reports that she does not drink alcohol or use drugs        Current Outpatient Medications:     allopurinol (ZYLOPRIM) 300 mg tablet, Take 1 tablet (300 mg total) by mouth every morning, Disp: 90 tablet, Rfl: 1    aspirin 81 MG tablet, Take 81 mg by mouth every morning  , Disp: , Rfl:     bumetanide (BUMEX) 2 mg tablet, Take 0 5 tablets (1 mg total) by mouth 2 (two) times a day (Patient taking differently: Take 2 mg by mouth daily Take one tablet daily; take extra 1/2 pill in the afternoon as needed for swelling ), Disp: 90 tablet, Rfl: 0    cholecalciferol (VITAMIN D3) 1,000 units tablet, Take 1 tablet by mouth daily, Disp: , Rfl:     clobetasol (TEMOVATE) 0 05 % ointment, Apply topically 2 (two) times a day, Disp: 30 g, Rfl: 1    colchicine (COLCRYS) 0 6 mg tablet, Take 0 6 mg by mouth daily as needed Only in gout flare   , Disp: , Rfl:     Crisaborole (EUCRISA) 2 % OINT, Apply twice daily, Disp: 60 g, Rfl: 0    diclofenac sodium (VOLTAREN) 1 %, Apply 2 g topically 4 (four) times a day, Disp: 100 g, Rfl: 0    gabapentin (NEURONTIN) 300 mg capsule, TAKE 1 CAPSULE EVERY DAY AT BEDTIME, Disp: 90 capsule, Rfl: 0    halobetasol (ULTRAVATE) 0 05 % cream, , Disp: , Rfl: 2    hydrocortisone 2 5 % cream, Apply topically 2 (two) times a day, Disp: 30 g, Rfl: 2    hydrOXYzine HCL (ATARAX) 25 mg tablet, TK 1 T PO QHS, Disp: , Rfl: 2    levothyroxine 112 mcg tablet, Take 1 tablet (112 mcg total) by mouth daily, Disp: 90 tablet, Rfl: 1    losartan (COZAAR) 25 mg tablet, TAKE 1 TABLET(25 MG) BY MOUTH DAILY, Disp: 90 tablet, Rfl: 3    Omega-3-acid Ethyl Esters & D3 1 & 1000 GM & UNIT KIT, Take 2 capsules by mouth 2 (two) times a day, Disp: , Rfl:     omeprazole (PriLOSEC) 20 mg delayed release capsule, Take 1 capsule (20 mg total) by mouth every morning, Disp: 90 capsule, Rfl: 1    propranolol (INDERAL) 80 mg tablet, TAKE 1/2 TABLET EVERY 12 HOURS DAILY (Patient taking differently: take one tablet daily), Disp: 90 tablet, Rfl: 3    ALPRAZolam (XANAX) 0 25 mg tablet, Take 1 tablet (0 25 mg total) by mouth daily at bedtime as needed for anxiety, Disp: 30 tablet, Rfl: 0    The following portions of the patient's history were reviewed and updated as appropriate: allergies, current medications, past family history, past medical history, past social history, past surgical history and problem list     Review of Systems   Constitutional: Negative  Negative for irritability  Respiratory: Positive for shortness of breath  Cardiovascular: Negative  Negative for chest pain and palpitations  Musculoskeletal: Negative for myalgias  Neurological: Negative  Negative for dizziness  Psychiatric/Behavioral: Negative for decreased concentration and suicidal ideas  The patient is nervous/anxious and has insomnia  Objective:    /64   Pulse 70   Temp 98 2 °F (36 8 °C)   Resp 20   Ht 4' 11 5" (1 511 m)   Wt 102 kg (224 lb 12 8 oz)   SpO2 97%   BMI 44 64 kg/m²      Physical Exam   Constitutional: She is oriented to person, place, and time  She appears well-developed and well-nourished  Cardiovascular: Normal rate, regular rhythm and normal heart sounds  Pulmonary/Chest: Effort normal and breath sounds normal    Abdominal: Soft  Bowel sounds are normal    Neurological: She is alert and oriented to person, place, and time     Psychiatric: Her behavior is normal  Judgment normal    TEARFUL         Recent Results (from the past 1008 hour(s))   Basic metabolic panel    Collection Time: 02/08/19  8:13 AM   Result Value Ref Range    Sodium 138 136 - 145 mmol/L    Potassium 4 4 3 5 - 5 3 mmol/L    Chloride 105 100 - 108 mmol/L    CO2 24 21 - 32 mmol/L    ANION GAP 9 4 - 13 mmol/L    BUN 77 (H) 5 - 25 mg/dL    Creatinine 2 04 (H) 0 60 - 1 30 mg/dL    Glucose, Fasting 105 (H) 65 - 99 mg/dL    Calcium 9 3 8 3 - 10 1 mg/dL    eGFR 23 ml/min/1 73sq m   Protein / creatinine ratio, urine    Collection Time: 02/08/19  8:13 AM   Result Value Ref Range    Creatinine, Ur 83 9 mg/dL    Protein Urine Random 9 mg/dL    Prot/Creat Ratio, Ur 0 11 (H) 0 00 - 0 10   Urinalysis with microscopic    Collection Time: 02/08/19  8:13 AM   Result Value Ref Range    Clarity, UA Clear     Color, UA Yellow     Specific [place], UA 1 017 1 003 - 1 030    pH, UA 6 0 4 5 - 8 0    Glucose, UA Negative Negative mg/dl    Ketones, UA Negative Negative mg/dl    Blood, UA Negative Negative    Protein, UA Negative Negative mg/dl    Nitrite, UA Negative Negative    Bilirubin, UA Negative Negative    Urobilinogen, UA 0 2 0 2, 1 0 E U /dl E U /dl    Leukocytes, UA Moderate (A) Negative    WBC, UA 10-20 (A) None Seen, 0-5, 5-55, 5-65 /hpf    RBC, UA None Seen None Seen, 0-5 /hpf    Hyaline Casts, UA None Seen None Seen /lpf    Bacteria, UA Occasional None Seen, Occasional /hpf    Epithelial Cells Moderate (A) None Seen, Occasional /hpf   CBC and differential    Collection Time: 02/08/19  8:13 AM   Result Value Ref Range    WBC 6 65 4 31 - 10 16 Thousand/uL    RBC 3 84 3 81 - 5 12 Million/uL    Hemoglobin 10 9 (L) 11 5 - 15 4 g/dL    Hematocrit 35 0 34 8 - 46 1 %    MCV 91 82 - 98 fL    MCH 28 4 26 8 - 34 3 pg    MCHC 31 1 (L) 31 4 - 37 4 g/dL    RDW 15 9 (H) 11 6 - 15 1 %    MPV 12 0 8 9 - 12 7 fL    Platelets 199 138 - 253 Thousands/uL    nRBC 0 /100 WBCs    Neutrophils Relative 70 43 - 75 %    Immat GRANS % 1 0 - 2 %    Lymphocytes Relative 14 14 - 44 %    Monocytes Relative 9 4 - 12 %    Eosinophils Relative 5 0 - 6 %    Basophils Relative 1 0 - 1 %    Neutrophils Absolute 4 71 1 85 - 7 62 Thousands/µL    Immature Grans Absolute 0 04 0 00 - 0 20 Thousand/uL    Lymphocytes Absolute 0 93 0 60 - 4 47 Thousands/µL    Monocytes Absolute 0 59 0 17 - 1 22 Thousand/µL    Eosinophils Absolute 0 35 0 00 - 0 61 Thousand/µL    Basophils Absolute 0 03 0 00 - 0 10 Thousands/µL   D-dimer, quantitative    Collection Time: 02/08/19  8:13 AM   Result Value Ref Range    D-Dimer, Quant 1,186 (H) <500 ng/ml (FEU)   Basic metabolic panel    Collection Time: 02/26/19 10:36 AM   Result Value Ref Range    Sodium 153 (H) 136 - 145 mmol/L    Potassium 5 0 3 5 - 5 3 mmol/L    Chloride 112 (H) 100 - 108 mmol/L    CO2 28 21 - 32 mmol/L    ANION GAP 13 4 - 13 mmol/L    BUN 62 (H) 5 - 25 mg/dL    Creatinine 1 80 (H) 0 60 - 1 30 mg/dL    Glucose 74 65 - 140 mg/dL    Calcium 9 8 8 3 - 10 1 mg/dL    eGFR 26 ml/min/1 73sq m       Assessment/Plan:    I view of her comorbid conditions, I started her on a trial of low dose of xanax  Patient will start with half a tablet to see if she is able to tolerate the medication  Patient understands that sometimes anxiety medications can have opposite effect and can make anxiety worse  The if that happens patient will follow in the office or go to the ER    I did call her son and discussed in detail about the concerns  She has a routine followup after 1 week  Diagnoses and all orders for this visit:    Anxiety  -     ALPRAZolam (XANAX) 0 25 mg tablet;  Take 1 tablet (0 25 mg total) by mouth daily at bedtime as needed for anxiety    Acute on chronic diastolic congestive heart failure ([facility] [address] )    Morbid obesity with body mass index of 40 0-44 9 in [facility])

## 2019-03-01 ENCOUNTER — TELEPHONE (OUTPATIENT)
Dept: OTHER | Facility: HOSPITAL | Age: 79
End: 2019-03-01

## 2019-03-01 DIAGNOSIS — N18.30 CKD (CHRONIC KIDNEY DISEASE), STAGE III (HCC): Primary | ICD-10-CM

## 2019-03-01 NOTE — TELEPHONE ENCOUNTER
Reviewed the patient's blood work  Creatinine has improved to baseline 1 2 mg/dL  Electrolytes are stable  Spoke with the patient over the phone, patient states she is feeling better today     -the changes that were made-spironolactone was held, Sherrilyn Hardik was held, Bumex was lowered to 1 mg daily for 2-3 days  -repeat BMP yesterday with improved blood work suggesting either the patient was intravascularly volume depleted versus auto regulatory failure    Plan  -continue the low-dose losartan that the patient was started on  -continue holding Evelin and spironolactone  -continue Bumex, but increased to half a tablet twice a day (for a total of 1 mg twice a day)  -if the weight increases by more than 2 lb, the patient knows to take an extra 1 mg tablet  -repeat BMP in 2 weeks  -I will cc the primary care physician in the cardiology physician to this note- please note the mitral valve calcification   Thank you

## 2019-03-04 NOTE — PROGRESS NOTES
Spoke with patient regarding results of recent 2D echo  There was evidence of marked moderate bulky posterior calcification of mitral valve trace regurgitation mild stenosis  She does follow with [facility]  I will contact Dr Marlin Adams regarding follow-up

## 2019-03-05 ENCOUNTER — APPOINTMENT (OUTPATIENT)
Dept: LAB | Facility: CLINIC | Age: 79
End: 2019-03-05
Payer: MEDICARE

## 2019-03-05 ENCOUNTER — TRANSCRIBE ORDERS (OUTPATIENT)
Dept: LAB | Facility: CLINIC | Age: 79
End: 2019-03-05

## 2019-03-05 DIAGNOSIS — E03.9 HYPOTHYROIDISM, UNSPECIFIED TYPE: ICD-10-CM

## 2019-03-05 LAB — TSH SERPL DL<=0.05 MIU/L-ACNC: 1.28 UIU/ML (ref 0.36–3.74)

## 2019-03-05 PROCEDURE — 36415 COLL VENOUS BLD VENIPUNCTURE: CPT

## 2019-03-05 PROCEDURE — 84443 ASSAY THYROID STIM HORMONE: CPT

## 2019-03-08 ENCOUNTER — PATIENT OUTREACH (OUTPATIENT)
Dept: FAMILY MEDICINE CLINIC | Facility: CLINIC | Age: 79
End: 2019-03-08

## 2019-03-08 ENCOUNTER — OFFICE VISIT (OUTPATIENT)
Dept: FAMILY MEDICINE CLINIC | Facility: CLINIC | Age: 79
End: 2019-03-08
Payer: MEDICARE

## 2019-03-08 VITALS
DIASTOLIC BLOOD PRESSURE: 60 MMHG | BODY MASS INDEX: 43.39 KG/M2 | HEART RATE: 56 BPM | SYSTOLIC BLOOD PRESSURE: 122 MMHG | HEIGHT: 60 IN | WEIGHT: 221 LBS | OXYGEN SATURATION: 96 %

## 2019-03-08 DIAGNOSIS — E78.5 HYPERLIPIDEMIA, UNSPECIFIED HYPERLIPIDEMIA TYPE: ICD-10-CM

## 2019-03-08 DIAGNOSIS — Z13.820 OSTEOPOROSIS SCREENING: ICD-10-CM

## 2019-03-08 DIAGNOSIS — N18.30 CKD (CHRONIC KIDNEY DISEASE), STAGE III (HCC): ICD-10-CM

## 2019-03-08 DIAGNOSIS — R73.09 ELEVATED GLUCOSE: ICD-10-CM

## 2019-03-08 DIAGNOSIS — I50.40 COMBINED SYSTOLIC AND DIASTOLIC HEART FAILURE, UNSPECIFIED HF CHRONICITY (HCC): ICD-10-CM

## 2019-03-08 DIAGNOSIS — Z00.00 MEDICARE ANNUAL WELLNESS VISIT, SUBSEQUENT: Primary | ICD-10-CM

## 2019-03-08 DIAGNOSIS — E03.9 HYPOTHYROIDISM, UNSPECIFIED TYPE: ICD-10-CM

## 2019-03-08 DIAGNOSIS — Z71.89 COMPLEX CARE COORDINATION: Primary | ICD-10-CM

## 2019-03-08 DIAGNOSIS — I10 ESSENTIAL HYPERTENSION: ICD-10-CM

## 2019-03-08 PROCEDURE — G0439 PPPS, SUBSEQ VISIT: HCPCS | Performed by: FAMILY MEDICINE

## 2019-03-08 NOTE — PROGRESS NOTES
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)    Hypothyroidism    Acute on chronic diastolic congestive heart failure (HCC)    Arthropathy of knee    Hallux abductovalgus with bunions, unspecified laterality    Atherosclerosis of arteries of extremities (HCC)    Chronic low back pain    Chronic venous insufficiency    CKD (chronic kidney disease), stage III (HCC)    Difficulty in walking    Diverticulitis of colon    Elevated triglycerides with high cholesterol    Gout    Hiatal hernia    Hyperlipemia    Hyperuricemia    Knee pain    Lumbar disc herniation with radiculopathy    Morbid obesity with body mass index of 40 0-44 9 in adult ([address] )    Nephrolithiasis    Onychomycosis    Obstructive sleep apnea    Umbilical hernia    Tarsal tunnel syndrome    Rupture of popliteal cyst    Pseudogout of left wrist    Plantar fascial fibromatosis    Anxiety    Alkaline phosphatase elevation    Pain in both feet    Gastroesophageal reflux disease without esophagitis    Iron deficiency anemia secondary to inadequate dietary iron intake    Radiculopathy of lumbar region    Corns    SOB (shortness of breath)    Eczema     Past Medical History:   Diagnosis Date    Anxiety     Arthritis     joints    Cataract     Disease of thyroid gland     Eczema     GERD (gastroesophageal reflux disease)     Gout     Heart failure (HCC)     Hepatitis     Hiatal hernia     Hypertension     Onychomycosis last assessed: 16    Orthopnea     resolved: 16    Sleep apnea     wears CPAP     Past Surgical History:   Procedure Laterality Date    ABDOMINAL SURGERY          BREAST SURGERY Right     cyst removal    CARPAL TUNNEL RELEASE Left     CARPAL TUNNEL RELEASE Right     CATARACT EXTRACTION       SECTION  1960    x1    COLONOSCOPY N/A 2018    Procedure: COLONOSCOPY;  Surgeon: Siva Anderson MD;  Location: [place] [address] GI LAB; Service: Gastroenterology    DILATION AND CURETTAGE OF UTERUS  1967    EYE SURGERY Left 2006    cataracts    HERNIA REPAIR      umbilical hernia    HYSTERECTOMY      INCISIONAL HERNIA REPAIR      incarcerated    KNEE ARTHROSCOPY Right     IA REMV CATARACT EXTRACAP,INSERT LENS Right 3/27/2017    Procedure: EXTRACTION EXTRACAPSULAR CATARACT PHACO INTRAOCULAR LENS (IOL);   Surgeon: Rahda Antoine MD;  Location: [facility] MAIN OR;  Service: Ophthalmology     Family History   Problem Relation Age of Onset    Diabetes Mother     Coronary artery disease Mother     Arthritis Mother     Hypertension Mother     Hypertension Father     Diabetes Brother     Diabetes Son     Arthritis Family      Social History     Tobacco Use   Smoking Status Never Smoker   Smokeless Tobacco Never Used     Social History     Substance and Sexual Activity   Alcohol Use No      Social History     Substance and Sexual Activity   Drug Use No       Current Outpatient Medications   Medication Sig Dispense Refill    allopurinol (ZYLOPRIM) 300 mg tablet Take 1 tablet (300 mg total) by mouth every morning 90 tablet 1    aspirin 81 MG tablet Take 81 mg by mouth every morning        bumetanide (BUMEX) 2 mg tablet Take 0 5 tablets (1 mg total) by mouth 2 (two) times a day (Patient taking differently: Take 2 mg by mouth daily Take one tablet daily; take extra 1/2 pill in the afternoon as needed for swelling ) 90 tablet 0    cholecalciferol (VITAMIN D3) 1,000 units tablet Take 1 tablet by mouth daily      clobetasol (TEMOVATE) 0 05 % ointment Apply topically 2 (two) times a day 30 g 1    diclofenac sodium (VOLTAREN) 1 % Apply 2 g topically 4 (four) times a day 100 g 0    gabapentin (NEURONTIN) 300 mg capsule TAKE 1 CAPSULE EVERY DAY AT BEDTIME 90 capsule 0    halobetasol (ULTRAVATE) 0 05 % cream   2    levothyroxine 112 mcg tablet Take 1 tablet (112 mcg total) by mouth daily 90 tablet 1    losartan (COZAAR) 25 mg tablet TAKE 1 TABLET(25 MG) BY MOUTH DAILY 90 tablet 3    Omega-3-acid Ethyl Esters & D3 1 & 1000 GM & UNIT KIT Take 2 capsules by mouth 2 (two) times a day      propranolol (INDERAL) 80 mg tablet TAKE 1/2 TABLET EVERY 12 HOURS DAILY (Patient taking differently: take one tablet daily) 90 tablet 3    ALPRAZolam (XANAX) 0 25 mg tablet Take 1 tablet (0 25 mg total) by mouth daily at bedtime as needed for anxiety (Patient not taking: Reported on 3/8/2019) 30 tablet 0    hydrOXYzine HCL (ATARAX) 25 mg tablet TK 1 T PO QHS  2    omeprazole (PriLOSEC) 20 mg delayed release capsule Take 1 capsule (20 mg total) by mouth every morning 90 capsule 1     No current facility-administered medications for this visit  No Known Allergies  Immunization History   Administered Date(s) Administered    Influenza Split High Dose Preservative Free IM 10/09/2012, 09/24/2013, 09/09/2014, 10/02/2015, 09/23/2016, 10/24/2017    Influenza TIV (IM) 10/25/2001, 10/28/2005, 10/10/2006, 10/12/2007, 10/14/2008, 09/17/2010, 09/23/2011    Influenza, high dose seasonal 0 5 mL 10/05/2018    Pneumococcal Conjugate 13-Valent 11/15/2017    Pneumococcal Polysaccharide PPV23 10/10/2006    Zoster 11/30/2017       Patient Care Team:  Lani Jama MD as PCP - MD Jonathon Hernandez MD Theodis Pila, MD Jayne Kida, MD    Medicare Screening Tests and Risk Assessments:  Last Medicare Wellness visit information reviewed, patient interviewed, no change since last [facility]       Health Risk Assessment:  Patient rates overall health as poor  Patient feels that their physical health rating is Same  Eyesight was rated as Same  Hearing was rated as Same  Patient feels that their emotional and mental health rating is Same  Pain experienced by patient in the last 7 days has been A lot  Patient's pain rating has been 8/10  Patient states that she has experienced no weight loss or gain in last 6 months  Emotional/Mental Health:  Patient has been feeling nervous/anxious  PHQ-9 Depression Screening:    Frequency of the following problems over the past two weeks:      1  Little interest or pleasure in doing things: 1 - several days  PHQ-2 Score: 1          Broken Bones/Falls: Fall Risk Assessment:    In the past year, patient has experienced: No history of falling in past year          Bladder/Bowel:  Patient has not leaked urine accidently in the last six months  Patient reports no loss of bowel control  Immunizations:  Patient has had a flu vaccination within the last year  Patient has received a pneumonia shot  Patient has received a shingles shot  Home Safety:  Patient has trouble with stairs inside or outside of their home  Patient currently reports that there are safety hazards present in home , working smoke alarms, working carbon monoxide detectors  Preventative Screenings:   No breast cancer screening performed, glaucoma eye exam completed,     Nutrition:  Current diet: No Added Salt, Low Saturated Fat and Limited junk food with servings of the following:    Medications:  Patient is currently taking over-the-counter supplements  Patient is able to manage medications  Lifestyle Choices:  Patient reports no tobacco use  Patient has not smoked or used tobacco in the past   Patient reports no alcohol use  Patient drives a vehicle  Patient wears seat belt  Current level of exercise of physical activity described by patient as: none          Activities of Daily Living:  Can get out of bed by his or her self, able to dress self, able to make own meals, able to do own shopping, able to bathe self, can do own laundry/housekeeping, can manage own money, pay bills and track expenses    Previous Hospitalizations:  No hospitalization or ED visit in past 12 months        Advanced Directives:  Patient has decided on a power of   Patient has spoken to designated power of   Patient has completed advanced directive  Preventative Screening/Counseling:      Cardiovascular:      General: Risks and Benefits Discussed and Screening Current      Counseling: Healthy Diet, Healthy Weight, Improve Cholesterol, Improve Blood Pressure and Improve Exercise Tolerance          Diabetes:      General: Risks and Benefits Discussed and Screening Current      Counseling: Healthy Diet, Healthy Weight and Improve Physical Activity          Colorectal Cancer:      General: Risks and Benefits Discussed and Screening Not Indicated          Breast Cancer:      General: Risks and Benefits Discussed and Screening Not Indicated          Cervical Cancer:      General: Risks and Benefits Discussed          Osteoporosis:      General: Risks and Benefits Discussed      Due for studies: Bone Density Ultrasound          AAA:      Family history of abdominal aortic aneurysm  General: Patient Declines          Glaucoma:      General: Risks and Benefits Discussed          HIV:      General: Patient Declines          Hepatitis C:      General: Patient Declines        Advanced Directives:   Patient has living will for healthcare, has durable POA for healthcare, patient has an advanced directive  Information on ACP and/or AD provided  End of life assessment reviewed with patient  Provider agrees with end of life decisions        Immunizations:      Influenza: Influenza UTD This Year      Pneumococcal: Lifetime Vaccine Completed      Zostavax: Zostavax Vaccine UTD      Other Preventative Counseling (Non-Medicare):   Fall Prevention, Increase physical activity, Car/seat belt/driving safety reviewed and Weight reduction discussed

## 2019-03-08 NOTE — PROGRESS NOTES
Outpatient Care Management Note:    Re: Introduced to patient at PCP office  She agrees to participate in outpatient care management  Provided my contact information to patient  Will follow up with patient next week  Pt ambulatory with use of single point cane  Recently   Lives alone  Son lives nearby

## 2019-03-12 ENCOUNTER — PATIENT OUTREACH (OUTPATIENT)
Dept: FAMILY MEDICINE CLINIC | Facility: CLINIC | Age: 79
End: 2019-03-12

## 2019-03-12 NOTE — PROGRESS NOTES
Outpatient Care Management Note:    Re: Called pt as follow up from Friday's visit  Pt reports she is feeling "better"  She states she "sometimes feels a little dizzy"  Denies any falls or syncope  We discussed changing positions slowly and to use cane at all times  Pt verbalized understanding of same  Pt expressed interest in obtaining a walker with a seat  Advised patient to discuss with son, I offered to provide information directly to son, she declined  Pt states she has a seat in shower, grab bars previously used by her   Pt is a recent  and lives alone in handicap accessible home  Prepares her own food  She states she follows a low sodium diet  Declines meals on wheels  Pt has a scale in home, reported weight 223lbs  Encouraged patient to weigh self daily and record  Notify PCP if weight gain 5 lbs/week  Pt fills her own pillbox every Saturday  Advised patient to keep medication list current and to update it frequently when changes made  Agrees to next outreach in two to three weeks

## 2019-03-15 ENCOUNTER — LAB (OUTPATIENT)
Dept: LAB | Facility: CLINIC | Age: 79
End: 2019-03-15
Payer: MEDICARE

## 2019-03-15 DIAGNOSIS — N18.30 CKD (CHRONIC KIDNEY DISEASE), STAGE III (HCC): ICD-10-CM

## 2019-03-15 LAB
ANION GAP SERPL CALCULATED.3IONS-SCNC: 6 MMOL/L (ref 4–13)
BUN SERPL-MCNC: 33 MG/DL (ref 5–25)
CALCIUM SERPL-MCNC: 9.1 MG/DL (ref 8.3–10.1)
CHLORIDE SERPL-SCNC: 104 MMOL/L (ref 100–108)
CO2 SERPL-SCNC: 31 MMOL/L (ref 21–32)
CREAT SERPL-MCNC: 1.41 MG/DL (ref 0.6–1.3)
GFR SERPL CREATININE-BSD FRML MDRD: 35 ML/MIN/1.73SQ M
GLUCOSE SERPL-MCNC: 76 MG/DL (ref 65–140)
POTASSIUM SERPL-SCNC: 4.1 MMOL/L (ref 3.5–5.3)
SODIUM SERPL-SCNC: 141 MMOL/L (ref 136–145)

## 2019-03-15 PROCEDURE — 36415 COLL VENOUS BLD VENIPUNCTURE: CPT

## 2019-03-15 PROCEDURE — 80048 BASIC METABOLIC PNL TOTAL CA: CPT

## 2019-03-16 NOTE — RESULT ENCOUNTER NOTE
Hello    Patient normally is followed up by Ms Jennifer Guerrero  Can the patient be notified that the renal function, Cr, is upper end of patient's normal  For now, no changes to the current medications  BP appears well controlled     - repeat BMP prior to next appt in one month    Thank you    np

## 2019-03-18 ENCOUNTER — TELEPHONE (OUTPATIENT)
Dept: NEPHROLOGY | Facility: CLINIC | Age: 79
End: 2019-03-18

## 2019-03-18 DIAGNOSIS — N18.30 CKD (CHRONIC KIDNEY DISEASE), STAGE III (HCC): Primary | ICD-10-CM

## 2019-03-18 NOTE — TELEPHONE ENCOUNTER
----- Message from Corwin Babcock MD sent at 3/15/2019  8:20 PM EDT -----  Hello    Patient normally is followed up by Ms Sohan Clayton  Can the patient be notified that the renal function, Cr, is upper end of patient's normal  For now, no changes to the current medications  BP appears well controlled     - repeat BMP prior to next appt in one month    Thank you    np

## 2019-03-27 ENCOUNTER — HOSPITAL ENCOUNTER (OUTPATIENT)
Dept: RADIOLOGY | Facility: HOSPITAL | Age: 79
Discharge: HOME | End: 2019-03-27
Payer: MEDICARE

## 2019-03-27 DIAGNOSIS — R06.02 SHORTNESS OF BREATH: ICD-10-CM

## 2019-03-27 PROCEDURE — 93970 EXTREMITY STUDY: CPT | Performed by: SURGERY

## 2019-03-27 PROCEDURE — 93970 EXTREMITY STUDY: CPT

## 2019-04-02 ENCOUNTER — HOSPITAL ENCOUNTER (OUTPATIENT)
Dept: RADIOLOGY | Facility: HOSPITAL | Age: 79
Discharge: HOME | End: 2019-04-02
Payer: MEDICARE

## 2019-04-02 DIAGNOSIS — Z13.820 OSTEOPOROSIS SCREENING: ICD-10-CM

## 2019-04-02 DIAGNOSIS — Z00.00 MEDICARE ANNUAL WELLNESS VISIT, SUBSEQUENT: ICD-10-CM

## 2019-04-02 PROCEDURE — 77080 DXA BONE DENSITY AXIAL: CPT

## 2019-04-12 ENCOUNTER — PATIENT OUTREACH (OUTPATIENT)
Dept: FAMILY MEDICINE CLINIC | Facility: CLINIC | Age: 79
End: 2019-04-12

## 2019-04-15 ENCOUNTER — LAB (OUTPATIENT)
Dept: LAB | Facility: CLINIC | Age: 79
End: 2019-04-15
Payer: MEDICARE

## 2019-04-15 DIAGNOSIS — N18.30 CKD (CHRONIC KIDNEY DISEASE), STAGE III (HCC): ICD-10-CM

## 2019-04-15 LAB
ANION GAP SERPL CALCULATED.3IONS-SCNC: 6 MMOL/L (ref 4–13)
BUN SERPL-MCNC: 30 MG/DL (ref 5–25)
CALCIUM SERPL-MCNC: 8.9 MG/DL (ref 8.3–10.1)
CHLORIDE SERPL-SCNC: 108 MMOL/L (ref 100–108)
CO2 SERPL-SCNC: 30 MMOL/L (ref 21–32)
CREAT SERPL-MCNC: 1.2 MG/DL (ref 0.6–1.3)
GFR SERPL CREATININE-BSD FRML MDRD: 43 ML/MIN/1.73SQ M
GLUCOSE P FAST SERPL-MCNC: 90 MG/DL (ref 65–99)
POTASSIUM SERPL-SCNC: 4 MMOL/L (ref 3.5–5.3)
SODIUM SERPL-SCNC: 144 MMOL/L (ref 136–145)

## 2019-04-15 PROCEDURE — 36415 COLL VENOUS BLD VENIPUNCTURE: CPT

## 2019-04-15 PROCEDURE — 80048 BASIC METABOLIC PNL TOTAL CA: CPT

## 2019-04-17 ENCOUNTER — OFFICE VISIT (OUTPATIENT)
Dept: NEPHROLOGY | Facility: CLINIC | Age: 79
End: 2019-04-17
Payer: MEDICARE

## 2019-04-17 VITALS
BODY MASS INDEX: 43.19 KG/M2 | HEIGHT: 60 IN | SYSTOLIC BLOOD PRESSURE: 150 MMHG | HEART RATE: 62 BPM | DIASTOLIC BLOOD PRESSURE: 62 MMHG | WEIGHT: 220 LBS

## 2019-04-17 DIAGNOSIS — R60.9 EDEMA, UNSPECIFIED TYPE: ICD-10-CM

## 2019-04-17 DIAGNOSIS — I10 ESSENTIAL HYPERTENSION: ICD-10-CM

## 2019-04-17 DIAGNOSIS — N18.30 CKD (CHRONIC KIDNEY DISEASE), STAGE III (HCC): Primary | ICD-10-CM

## 2019-04-17 DIAGNOSIS — R06.02 SOB (SHORTNESS OF BREATH): ICD-10-CM

## 2019-04-17 DIAGNOSIS — D63.8 ANEMIA OF CHRONIC DISEASE: ICD-10-CM

## 2019-04-17 PROCEDURE — 99215 OFFICE O/P EST HI 40 MIN: CPT | Performed by: INTERNAL MEDICINE

## 2019-04-17 RX ORDER — BUMETANIDE 2 MG/1
2 TABLET ORAL DAILY
Qty: 45 TABLET | Refills: 3
Start: 2019-04-17 | End: 2019-05-18 | Stop reason: SDUPTHER

## 2019-04-18 ENCOUNTER — OFFICE VISIT (OUTPATIENT)
Dept: PODIATRY | Facility: CLINIC | Age: 79
End: 2019-04-18
Payer: MEDICARE

## 2019-04-18 ENCOUNTER — TELEPHONE (OUTPATIENT)
Dept: FAMILY MEDICINE CLINIC | Facility: CLINIC | Age: 79
End: 2019-04-18

## 2019-04-18 VITALS
BODY MASS INDEX: 43.19 KG/M2 | HEIGHT: 60 IN | WEIGHT: 220 LBS | DIASTOLIC BLOOD PRESSURE: 74 MMHG | SYSTOLIC BLOOD PRESSURE: 148 MMHG

## 2019-04-18 DIAGNOSIS — I70.209 ATHEROSCLEROSIS OF ARTERIES OF EXTREMITIES (HCC): Primary | ICD-10-CM

## 2019-04-18 DIAGNOSIS — M79.671 PAIN IN BOTH FEET: ICD-10-CM

## 2019-04-18 DIAGNOSIS — L84 CORNS: ICD-10-CM

## 2019-04-18 DIAGNOSIS — M79.672 PAIN IN BOTH FEET: ICD-10-CM

## 2019-04-18 PROCEDURE — 11056 PARNG/CUTG B9 HYPRKR LES 2-4: CPT | Performed by: PODIATRIST

## 2019-05-02 ENCOUNTER — LAB (OUTPATIENT)
Dept: LAB | Facility: CLINIC | Age: 79
End: 2019-05-02
Payer: MEDICARE

## 2019-05-02 DIAGNOSIS — D63.8 ANEMIA OF CHRONIC DISEASE: ICD-10-CM

## 2019-05-02 DIAGNOSIS — R06.02 SOB (SHORTNESS OF BREATH): ICD-10-CM

## 2019-05-02 DIAGNOSIS — R60.9 EDEMA, UNSPECIFIED TYPE: ICD-10-CM

## 2019-05-02 DIAGNOSIS — I10 ESSENTIAL HYPERTENSION: ICD-10-CM

## 2019-05-02 DIAGNOSIS — N18.30 CKD (CHRONIC KIDNEY DISEASE), STAGE III (HCC): ICD-10-CM

## 2019-05-02 LAB
ANION GAP SERPL CALCULATED.3IONS-SCNC: 8 MMOL/L (ref 4–13)
BUN SERPL-MCNC: 34 MG/DL (ref 5–25)
CALCIUM SERPL-MCNC: 8.8 MG/DL (ref 8.3–10.1)
CHLORIDE SERPL-SCNC: 103 MMOL/L (ref 100–108)
CO2 SERPL-SCNC: 30 MMOL/L (ref 21–32)
CREAT SERPL-MCNC: 1.4 MG/DL (ref 0.6–1.3)
FERRITIN SERPL-MCNC: 171 NG/ML (ref 8–388)
GFR SERPL CREATININE-BSD FRML MDRD: 36 ML/MIN/1.73SQ M
GLUCOSE SERPL-MCNC: 167 MG/DL (ref 65–140)
IRON SATN MFR SERPL: 17 %
IRON SERPL-MCNC: 53 UG/DL (ref 50–170)
MAGNESIUM SERPL-MCNC: 2.1 MG/DL (ref 1.6–2.6)
POTASSIUM SERPL-SCNC: 3.9 MMOL/L (ref 3.5–5.3)
SODIUM SERPL-SCNC: 141 MMOL/L (ref 136–145)
TIBC SERPL-MCNC: 303 UG/DL (ref 250–450)

## 2019-05-02 PROCEDURE — 82728 ASSAY OF FERRITIN: CPT

## 2019-05-02 PROCEDURE — 83540 ASSAY OF IRON: CPT

## 2019-05-02 PROCEDURE — 83735 ASSAY OF MAGNESIUM: CPT

## 2019-05-02 PROCEDURE — 36415 COLL VENOUS BLD VENIPUNCTURE: CPT

## 2019-05-02 PROCEDURE — 80048 BASIC METABOLIC PNL TOTAL CA: CPT

## 2019-05-02 PROCEDURE — 83550 IRON BINDING TEST: CPT

## 2019-05-06 ENCOUNTER — OFFICE VISIT (OUTPATIENT)
Dept: PULMONOLOGY | Facility: MEDICAL CENTER | Age: 79
End: 2019-05-06
Payer: MEDICARE

## 2019-05-06 ENCOUNTER — TELEPHONE (OUTPATIENT)
Dept: NEPHROLOGY | Facility: CLINIC | Age: 79
End: 2019-05-06

## 2019-05-06 VITALS
OXYGEN SATURATION: 97 % | SYSTOLIC BLOOD PRESSURE: 158 MMHG | HEART RATE: 68 BPM | DIASTOLIC BLOOD PRESSURE: 68 MMHG | HEIGHT: 60 IN | WEIGHT: 221 LBS | RESPIRATION RATE: 12 BRPM | BODY MASS INDEX: 43.39 KG/M2 | TEMPERATURE: 97.1 F

## 2019-05-06 DIAGNOSIS — R06.02 SOB (SHORTNESS OF BREATH): Primary | ICD-10-CM

## 2019-05-06 PROCEDURE — 99214 OFFICE O/P EST MOD 30 MIN: CPT | Performed by: NURSE PRACTITIONER

## 2019-05-10 DIAGNOSIS — M54.16 RADICULOPATHY OF LUMBAR REGION: ICD-10-CM

## 2019-05-10 RX ORDER — GABAPENTIN 300 MG/1
300 CAPSULE ORAL
Qty: 90 CAPSULE | Refills: 0 | Status: SHIPPED | OUTPATIENT
Start: 2019-05-10 | End: 2020-02-29 | Stop reason: HOSPADM

## 2019-05-15 ENCOUNTER — PATIENT OUTREACH (OUTPATIENT)
Dept: FAMILY MEDICINE CLINIC | Facility: CLINIC | Age: 79
End: 2019-05-15

## 2019-05-18 DIAGNOSIS — E03.9 HYPOTHYROIDISM, UNSPECIFIED TYPE: ICD-10-CM

## 2019-05-18 DIAGNOSIS — I10 ESSENTIAL HYPERTENSION: ICD-10-CM

## 2019-05-20 RX ORDER — BUMETANIDE 2 MG/1
TABLET ORAL
Qty: 90 TABLET | Refills: 1 | Status: SHIPPED | OUTPATIENT
Start: 2019-05-20 | End: 2019-05-22 | Stop reason: SDUPTHER

## 2019-05-20 RX ORDER — LEVOTHYROXINE SODIUM 112 UG/1
TABLET ORAL
Qty: 90 TABLET | Refills: 1 | Status: SHIPPED | OUTPATIENT
Start: 2019-05-20 | End: 2019-10-26 | Stop reason: SDUPTHER

## 2019-05-22 ENCOUNTER — OFFICE VISIT (OUTPATIENT)
Dept: NEPHROLOGY | Facility: CLINIC | Age: 79
End: 2019-05-22
Payer: MEDICARE

## 2019-05-22 VITALS
WEIGHT: 220 LBS | BODY MASS INDEX: 43.19 KG/M2 | SYSTOLIC BLOOD PRESSURE: 140 MMHG | HEIGHT: 60 IN | HEART RATE: 70 BPM | DIASTOLIC BLOOD PRESSURE: 64 MMHG

## 2019-05-22 DIAGNOSIS — N18.30 CKD (CHRONIC KIDNEY DISEASE), STAGE III (HCC): ICD-10-CM

## 2019-05-22 DIAGNOSIS — I10 BENIGN ESSENTIAL HTN: ICD-10-CM

## 2019-05-22 DIAGNOSIS — N18.9 HISTORY OF ANEMIA DUE TO CHRONIC KIDNEY DISEASE: Primary | ICD-10-CM

## 2019-05-22 DIAGNOSIS — I10 ESSENTIAL HYPERTENSION: ICD-10-CM

## 2019-05-22 DIAGNOSIS — Z86.2 HISTORY OF ANEMIA DUE TO CHRONIC KIDNEY DISEASE: Primary | ICD-10-CM

## 2019-05-22 PROCEDURE — 99214 OFFICE O/P EST MOD 30 MIN: CPT | Performed by: INTERNAL MEDICINE

## 2019-05-22 RX ORDER — BUMETANIDE 2 MG/1
2 TABLET ORAL DAILY
Qty: 90 TABLET | Refills: 1 | Status: SHIPPED | OUTPATIENT
Start: 2019-05-22 | End: 2019-06-12 | Stop reason: SDUPTHER

## 2019-05-22 RX ORDER — FERROUS SULFATE TAB EC 324 MG (65 MG FE EQUIVALENT) 324 (65 FE) MG
324 TABLET DELAYED RESPONSE ORAL
Qty: 30 TABLET | Refills: 3
Start: 2019-05-22 | End: 2020-02-29 | Stop reason: HOSPADM

## 2019-05-22 RX ORDER — LOSARTAN POTASSIUM 25 MG/1
25 TABLET ORAL DAILY
Qty: 90 TABLET | Refills: 3 | Status: SHIPPED | OUTPATIENT
Start: 2019-05-22 | End: 2019-11-08 | Stop reason: SDUPTHER

## 2019-05-24 ENCOUNTER — PATIENT OUTREACH (OUTPATIENT)
Dept: FAMILY MEDICINE CLINIC | Facility: CLINIC | Age: 79
End: 2019-05-24

## 2019-06-12 ENCOUNTER — TELEPHONE (OUTPATIENT)
Dept: NEPHROLOGY | Facility: CLINIC | Age: 79
End: 2019-06-12

## 2019-06-12 ENCOUNTER — OFFICE VISIT (OUTPATIENT)
Dept: CARDIOLOGY CLINIC | Facility: CLINIC | Age: 79
End: 2019-06-12
Payer: MEDICARE

## 2019-06-12 VITALS
WEIGHT: 215 LBS | HEART RATE: 67 BPM | SYSTOLIC BLOOD PRESSURE: 140 MMHG | OXYGEN SATURATION: 98 % | DIASTOLIC BLOOD PRESSURE: 66 MMHG | BODY MASS INDEX: 42.21 KG/M2 | HEIGHT: 60 IN

## 2019-06-12 DIAGNOSIS — R60.0 BILATERAL LEG EDEMA: ICD-10-CM

## 2019-06-12 DIAGNOSIS — R06.00 EXERTIONAL DYSPNEA: Primary | ICD-10-CM

## 2019-06-12 DIAGNOSIS — I10 ESSENTIAL HYPERTENSION: ICD-10-CM

## 2019-06-12 PROCEDURE — 99214 OFFICE O/P EST MOD 30 MIN: CPT | Performed by: INTERNAL MEDICINE

## 2019-06-12 PROCEDURE — 93000 ELECTROCARDIOGRAM COMPLETE: CPT | Performed by: INTERNAL MEDICINE

## 2019-06-12 RX ORDER — BUMETANIDE 2 MG/1
TABLET ORAL
Qty: 135 TABLET | Refills: 1 | Status: SHIPPED | OUTPATIENT
Start: 2019-06-12 | End: 2020-02-29 | Stop reason: HOSPADM

## 2019-06-17 ENCOUNTER — TELEPHONE (OUTPATIENT)
Dept: NEPHROLOGY | Facility: CLINIC | Age: 79
End: 2019-06-17

## 2019-06-17 ENCOUNTER — APPOINTMENT (OUTPATIENT)
Dept: LAB | Facility: CLINIC | Age: 79
End: 2019-06-17
Payer: MEDICARE

## 2019-06-17 DIAGNOSIS — E78.5 HYPERLIPIDEMIA, UNSPECIFIED HYPERLIPIDEMIA TYPE: ICD-10-CM

## 2019-06-17 DIAGNOSIS — E03.9 HYPOTHYROIDISM, UNSPECIFIED TYPE: ICD-10-CM

## 2019-06-17 DIAGNOSIS — N18.30 CKD (CHRONIC KIDNEY DISEASE), STAGE III (HCC): ICD-10-CM

## 2019-06-17 LAB
ALBUMIN SERPL BCP-MCNC: 3.5 G/DL (ref 3.5–5)
ALP SERPL-CCNC: 124 U/L (ref 46–116)
ALT SERPL W P-5'-P-CCNC: 25 U/L (ref 12–78)
ANION GAP SERPL CALCULATED.3IONS-SCNC: 5 MMOL/L (ref 4–13)
AST SERPL W P-5'-P-CCNC: 24 U/L (ref 5–45)
BASOPHILS # BLD AUTO: 0.05 THOUSANDS/ΜL (ref 0–0.1)
BASOPHILS NFR BLD AUTO: 1 % (ref 0–1)
BILIRUB SERPL-MCNC: 0.45 MG/DL (ref 0.2–1)
BUN SERPL-MCNC: 36 MG/DL (ref 5–25)
CALCIUM SERPL-MCNC: 9.2 MG/DL (ref 8.3–10.1)
CHLORIDE SERPL-SCNC: 108 MMOL/L (ref 100–108)
CHOLEST SERPL-MCNC: 182 MG/DL (ref 50–200)
CO2 SERPL-SCNC: 30 MMOL/L (ref 21–32)
CREAT SERPL-MCNC: 1.24 MG/DL (ref 0.6–1.3)
EOSINOPHIL # BLD AUTO: 0.11 THOUSAND/ΜL (ref 0–0.61)
EOSINOPHIL NFR BLD AUTO: 2 % (ref 0–6)
ERYTHROCYTE [DISTWIDTH] IN BLOOD BY AUTOMATED COUNT: 15.6 % (ref 11.6–15.1)
EST. AVERAGE GLUCOSE BLD GHB EST-MCNC: 105 MG/DL
GFR SERPL CREATININE-BSD FRML MDRD: 41 ML/MIN/1.73SQ M
GLUCOSE P FAST SERPL-MCNC: 108 MG/DL (ref 65–99)
HBA1C MFR BLD: 5.3 % (ref 4.2–6.3)
HCT VFR BLD AUTO: 37.4 % (ref 34.8–46.1)
HDLC SERPL-MCNC: 46 MG/DL (ref 40–60)
HGB BLD-MCNC: 11.5 G/DL (ref 11.5–15.4)
IMM GRANULOCYTES # BLD AUTO: 0.03 THOUSAND/UL (ref 0–0.2)
IMM GRANULOCYTES NFR BLD AUTO: 1 % (ref 0–2)
LDLC SERPL CALC-MCNC: 105 MG/DL (ref 0–100)
LYMPHOCYTES # BLD AUTO: 0.73 THOUSANDS/ΜL (ref 0.6–4.47)
LYMPHOCYTES NFR BLD AUTO: 12 % (ref 14–44)
MCH RBC QN AUTO: 27.9 PG (ref 26.8–34.3)
MCHC RBC AUTO-ENTMCNC: 30.7 G/DL (ref 31.4–37.4)
MCV RBC AUTO: 91 FL (ref 82–98)
MONOCYTES # BLD AUTO: 0.5 THOUSAND/ΜL (ref 0.17–1.22)
MONOCYTES NFR BLD AUTO: 8 % (ref 4–12)
NEUTROPHILS # BLD AUTO: 4.63 THOUSANDS/ΜL (ref 1.85–7.62)
NEUTS SEG NFR BLD AUTO: 76 % (ref 43–75)
NONHDLC SERPL-MCNC: 136 MG/DL
NRBC BLD AUTO-RTO: 0 /100 WBCS
PLATELET # BLD AUTO: 176 THOUSANDS/UL (ref 149–390)
PMV BLD AUTO: 11.9 FL (ref 8.9–12.7)
POTASSIUM SERPL-SCNC: 4.2 MMOL/L (ref 3.5–5.3)
PROT SERPL-MCNC: 6.8 G/DL (ref 6.4–8.2)
RBC # BLD AUTO: 4.12 MILLION/UL (ref 3.81–5.12)
SODIUM SERPL-SCNC: 143 MMOL/L (ref 136–145)
TRIGL SERPL-MCNC: 157 MG/DL
TSH SERPL DL<=0.05 MIU/L-ACNC: 2.61 UIU/ML (ref 0.36–3.74)
WBC # BLD AUTO: 6.05 THOUSAND/UL (ref 4.31–10.16)

## 2019-06-17 PROCEDURE — 84443 ASSAY THYROID STIM HORMONE: CPT

## 2019-06-17 PROCEDURE — 80061 LIPID PANEL: CPT

## 2019-06-17 PROCEDURE — 36415 COLL VENOUS BLD VENIPUNCTURE: CPT | Performed by: FAMILY MEDICINE

## 2019-06-17 PROCEDURE — 83036 HEMOGLOBIN GLYCOSYLATED A1C: CPT | Performed by: FAMILY MEDICINE

## 2019-06-17 PROCEDURE — 80053 COMPREHEN METABOLIC PANEL: CPT | Performed by: FAMILY MEDICINE

## 2019-06-17 PROCEDURE — 85025 COMPLETE CBC W/AUTO DIFF WBC: CPT | Performed by: FAMILY MEDICINE

## 2019-06-17 NOTE — RESULT ENCOUNTER NOTE
Hello    Patient normally is followed up by Ms Diannah Libman  Can patient be notified the renal function is stable and at baseline  Electrolytes are stable  No changes for now      Thank you    np

## 2019-06-18 ENCOUNTER — OFFICE VISIT (OUTPATIENT)
Dept: FAMILY MEDICINE CLINIC | Facility: CLINIC | Age: 79
End: 2019-06-18
Payer: MEDICARE

## 2019-06-18 VITALS
RESPIRATION RATE: 18 BRPM | DIASTOLIC BLOOD PRESSURE: 72 MMHG | HEIGHT: 60 IN | BODY MASS INDEX: 42.29 KG/M2 | WEIGHT: 215.4 LBS | OXYGEN SATURATION: 97 % | SYSTOLIC BLOOD PRESSURE: 140 MMHG | HEART RATE: 62 BPM

## 2019-06-18 DIAGNOSIS — M1A.9XX0 CHRONIC GOUT WITHOUT TOPHUS, UNSPECIFIED CAUSE, UNSPECIFIED SITE: Primary | ICD-10-CM

## 2019-06-18 DIAGNOSIS — N18.30 CKD (CHRONIC KIDNEY DISEASE), STAGE III (HCC): ICD-10-CM

## 2019-06-18 DIAGNOSIS — E79.0 HYPERURICEMIA: ICD-10-CM

## 2019-06-18 DIAGNOSIS — E03.9 HYPOTHYROIDISM, UNSPECIFIED TYPE: ICD-10-CM

## 2019-06-18 DIAGNOSIS — K21.9 GASTROESOPHAGEAL REFLUX DISEASE WITHOUT ESOPHAGITIS: ICD-10-CM

## 2019-06-18 DIAGNOSIS — K44.9 HIATAL HERNIA: ICD-10-CM

## 2019-06-18 DIAGNOSIS — E78.5 HYPERLIPIDEMIA, UNSPECIFIED HYPERLIPIDEMIA TYPE: ICD-10-CM

## 2019-06-18 DIAGNOSIS — E66.01 MORBID OBESITY WITH BODY MASS INDEX OF 40.0-44.9 IN ADULT (HCC): ICD-10-CM

## 2019-06-18 DIAGNOSIS — R73.09 ELEVATED GLUCOSE: ICD-10-CM

## 2019-06-18 PROCEDURE — 99214 OFFICE O/P EST MOD 30 MIN: CPT | Performed by: FAMILY MEDICINE

## 2019-06-18 RX ORDER — ALLOPURINOL 300 MG/1
300 TABLET ORAL DAILY
Qty: 90 TABLET | Refills: 1 | Status: SHIPPED | OUTPATIENT
Start: 2019-06-18 | End: 2019-08-07 | Stop reason: ALTCHOICE

## 2019-06-18 RX ORDER — OMEPRAZOLE 20 MG/1
20 CAPSULE, DELAYED RELEASE ORAL EVERY MORNING
Qty: 90 CAPSULE | Refills: 1 | Status: SHIPPED | OUTPATIENT
Start: 2019-06-18 | End: 2020-01-28 | Stop reason: SDUPTHER

## 2019-06-21 ENCOUNTER — OFFICE VISIT (OUTPATIENT)
Dept: PODIATRY | Facility: CLINIC | Age: 79
End: 2019-06-21
Payer: MEDICARE

## 2019-06-21 VITALS
WEIGHT: 215 LBS | BODY MASS INDEX: 42.21 KG/M2 | RESPIRATION RATE: 17 BRPM | HEIGHT: 60 IN | HEART RATE: 69 BPM | SYSTOLIC BLOOD PRESSURE: 139 MMHG | DIASTOLIC BLOOD PRESSURE: 68 MMHG

## 2019-06-21 DIAGNOSIS — M79.672 PAIN IN BOTH FEET: ICD-10-CM

## 2019-06-21 DIAGNOSIS — M79.671 PAIN IN BOTH FEET: ICD-10-CM

## 2019-06-21 DIAGNOSIS — L84 CORNS: ICD-10-CM

## 2019-06-21 DIAGNOSIS — I70.209 PERIPHERAL ARTERIOSCLEROSIS (HCC): Primary | ICD-10-CM

## 2019-06-21 PROCEDURE — 11056 PARNG/CUTG B9 HYPRKR LES 2-4: CPT | Performed by: PODIATRIST

## 2019-06-25 ENCOUNTER — PATIENT OUTREACH (OUTPATIENT)
Dept: FAMILY MEDICINE CLINIC | Facility: CLINIC | Age: 79
End: 2019-06-25

## 2019-07-25 ENCOUNTER — PATIENT OUTREACH (OUTPATIENT)
Dept: FAMILY MEDICINE CLINIC | Facility: CLINIC | Age: 79
End: 2019-07-25

## 2019-07-25 NOTE — PROGRESS NOTES
Outpatient Care Management Note:    Re: Pt has moved in with son and family  She reports she fell in her bedroom last week, tripped along her bed  She hit her left knee but refused to seek medical attention  Pt reports family wanted her to go to hospital but she refused  Pt states she "feels better now with little pain in her knee"  Encouraged pt to schedule an appointment with PCP  Reminded to use rollator walker at all times  Next appointment 8/2/19, nephrology  Agrees to next outreach call in one month

## 2019-07-30 ENCOUNTER — LAB (OUTPATIENT)
Dept: LAB | Facility: CLINIC | Age: 79
End: 2019-07-30
Payer: MEDICARE

## 2019-07-30 DIAGNOSIS — N18.30 CKD (CHRONIC KIDNEY DISEASE), STAGE III (HCC): ICD-10-CM

## 2019-07-30 DIAGNOSIS — Z86.2 HISTORY OF ANEMIA DUE TO CHRONIC KIDNEY DISEASE: ICD-10-CM

## 2019-07-30 DIAGNOSIS — N18.9 HISTORY OF ANEMIA DUE TO CHRONIC KIDNEY DISEASE: ICD-10-CM

## 2019-07-30 DIAGNOSIS — I10 BENIGN ESSENTIAL HTN: ICD-10-CM

## 2019-07-30 LAB
ANION GAP SERPL CALCULATED.3IONS-SCNC: 7 MMOL/L (ref 4–13)
BACTERIA UR QL AUTO: ABNORMAL /HPF
BILIRUB UR QL STRIP: NEGATIVE
BUN SERPL-MCNC: 31 MG/DL (ref 5–25)
CALCIUM SERPL-MCNC: 9.1 MG/DL (ref 8.3–10.1)
CHLORIDE SERPL-SCNC: 106 MMOL/L (ref 100–108)
CLARITY UR: CLEAR
CO2 SERPL-SCNC: 31 MMOL/L (ref 21–32)
COLOR UR: YELLOW
CREAT SERPL-MCNC: 1.32 MG/DL (ref 0.6–1.3)
CREAT UR-MCNC: 44.8 MG/DL
ERYTHROCYTE [DISTWIDTH] IN BLOOD BY AUTOMATED COUNT: 15.8 % (ref 11.6–15.1)
GFR SERPL CREATININE-BSD FRML MDRD: 38 ML/MIN/1.73SQ M
GLUCOSE P FAST SERPL-MCNC: 101 MG/DL (ref 65–99)
GLUCOSE UR STRIP-MCNC: NEGATIVE MG/DL
HCT VFR BLD AUTO: 35.6 % (ref 34.8–46.1)
HGB BLD-MCNC: 10.8 G/DL (ref 11.5–15.4)
HGB UR QL STRIP.AUTO: NEGATIVE
HYALINE CASTS #/AREA URNS LPF: ABNORMAL /LPF
KETONES UR STRIP-MCNC: NEGATIVE MG/DL
LEUKOCYTE ESTERASE UR QL STRIP: ABNORMAL
MCH RBC QN AUTO: 27 PG (ref 26.8–34.3)
MCHC RBC AUTO-ENTMCNC: 30.3 G/DL (ref 31.4–37.4)
MCV RBC AUTO: 89 FL (ref 82–98)
NITRITE UR QL STRIP: NEGATIVE
NON-SQ EPI CELLS URNS QL MICRO: ABNORMAL /HPF
PH UR STRIP.AUTO: 6.5 [PH]
PHOSPHATE SERPL-MCNC: 2.9 MG/DL (ref 2.3–4.1)
PLATELET # BLD AUTO: 187 THOUSANDS/UL (ref 149–390)
PMV BLD AUTO: 12.1 FL (ref 8.9–12.7)
POTASSIUM SERPL-SCNC: 3.7 MMOL/L (ref 3.5–5.3)
PROT UR STRIP-MCNC: NEGATIVE MG/DL
PROT UR-MCNC: 7 MG/DL
PROT/CREAT UR: 0.16 MG/G{CREAT} (ref 0–0.1)
PTH-INTACT SERPL-MCNC: 153 PG/ML (ref 18.4–80.1)
RBC # BLD AUTO: 4 MILLION/UL (ref 3.81–5.12)
RBC #/AREA URNS AUTO: ABNORMAL /HPF
SODIUM SERPL-SCNC: 144 MMOL/L (ref 136–145)
SP GR UR STRIP.AUTO: 1.01 (ref 1–1.03)
UROBILINOGEN UR QL STRIP.AUTO: 0.2 E.U./DL
WBC # BLD AUTO: 6.85 THOUSAND/UL (ref 4.31–10.16)
WBC #/AREA URNS AUTO: ABNORMAL /HPF

## 2019-07-30 PROCEDURE — 81001 URINALYSIS AUTO W/SCOPE: CPT

## 2019-07-30 PROCEDURE — 84100 ASSAY OF PHOSPHORUS: CPT

## 2019-07-30 PROCEDURE — 82570 ASSAY OF URINE CREATININE: CPT

## 2019-07-30 PROCEDURE — 36415 COLL VENOUS BLD VENIPUNCTURE: CPT

## 2019-07-30 PROCEDURE — 85027 COMPLETE CBC AUTOMATED: CPT

## 2019-07-30 PROCEDURE — 84156 ASSAY OF PROTEIN URINE: CPT

## 2019-07-30 PROCEDURE — 80048 BASIC METABOLIC PNL TOTAL CA: CPT

## 2019-07-30 PROCEDURE — 83970 ASSAY OF PARATHORMONE: CPT

## 2019-07-30 NOTE — RESULT ENCOUNTER NOTE
Will review with the patient at the appointment this week  Proteinuria level stable  PTH is rising  Creatinine is slightly higher  Patient does have volume mediated acute kidney injury episodes in the past   Will review with the patient this we can review volume state  No changes for now

## 2019-08-02 ENCOUNTER — HOSPITAL ENCOUNTER (OUTPATIENT)
Dept: RADIOLOGY | Facility: HOSPITAL | Age: 79
Discharge: HOME | End: 2019-08-02
Attending: INTERNAL MEDICINE
Payer: MEDICARE

## 2019-08-02 ENCOUNTER — TRANSCRIBE ORDERS (OUTPATIENT)
Dept: ADMINISTRATIVE | Facility: HOSPITAL | Age: 79
End: 2019-08-02

## 2019-08-02 ENCOUNTER — OFFICE VISIT (OUTPATIENT)
Dept: NEPHROLOGY | Facility: CLINIC | Age: 79
End: 2019-08-02
Payer: MEDICARE

## 2019-08-02 VITALS
BODY MASS INDEX: 41.82 KG/M2 | SYSTOLIC BLOOD PRESSURE: 144 MMHG | HEART RATE: 70 BPM | HEIGHT: 60 IN | DIASTOLIC BLOOD PRESSURE: 72 MMHG | WEIGHT: 213 LBS
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
## 2019-08-02 DIAGNOSIS — N18.30 CKD (CHRONIC KIDNEY DISEASE), STAGE III (HCC): Primary | ICD-10-CM

## 2019-08-02 PROCEDURE — 73564 X-RAY EXAM KNEE 4 OR MORE: CPT

## 2019-08-02 PROCEDURE — 99215 OFFICE O/P EST HI 40 MIN: CPT | Performed by: INTERNAL MEDICINE

## 2019-08-02 NOTE — PATIENT INSTRUCTIONS
1) Avoid NSAIDS - (Example - motrin, advil, ibuprofen, aleve, exederin, etc)  2) Always follow a low salt diet  3) please have Xray completed and see Dr Marina Gao -  [phone]  4) please have labwork in 3 months  5) appointment in 3- 4 months

## 2019-08-02 NOTE — LETTER
August 2, 2019     Ade Flowers MD  [address]  [facility] [address]    Patient: Joie Quiroz   YOB: 1940   Date of Visit: 8/2/2019       Dear Dr Danny Sauer:    Thank you for referring Yarely Llanos to me for evaluation  Below are my notes for this consultation  If you have questions, please do not hesitate to call me  I look forward to following your patient along with you  Sincerely,        Karuna Rodriguez MD        CC: No Recipients  Karuna Rodriguez MD  8/2/2019 12:05 PM  Sign at close encounter  [address] [facility] 77 y o  female MRN: [id number]  8/2/2019    Reason for Visit: CKD III    ASSESSMENT and PLAN:    I had the pleasure of seeing Ms Ayaan Matthew today in the renal clinic for the continued management of CKD III  Since our last visit, there has been no ER visits or hospitalizations  He currently has no complaints at this time and is feeling well  Patient denies any chest pain, shortness of breath and swelling  The last blood work was done on 7/30/19, which we have reviewed together  71-year-old female with a past medical history of chronic kidney disease, venous stasis, HTN, hypothyroidism, lumbar canal stenosis, Anxiety, GERD, neuropathy, Gout, EF 42-90%, Grade 2 diastolic dysfunction who presents for follow up for CKD  To note, patient's  has now passed away in August      1) CKD - Prior year had nl Cr prior to aug 2016 and then had SHABANA during diarrhea episode  To note, patient also has a long standing history of HTN  Creatinine in 2013, 0 9 mg/dL  Cr early 2016 was 0 80-0 9 mg/dL; then increased starting in august 2016 to 2 3 mg/dL and has fluctuated since  CT scan in 2016, kidneys appearing normal at that time  UA in august was bland       Etiology of CKD may be long standing HTN and ATN   Baseline Cr ~ 1 1 -1 3 mg/dL, but recently in October 2018 the creatinine was rising to 1 7 mg/dL and this was thought to be a new baseline; but Cr was rising to 2 04  This is felt to be volume mediated and therefore diuretics were adjusted and lowered slightly with the holding of spironolactone and also there is question of perfusion issues and amlodipine was also held and patient's creatinine has returned to upper limit of baseline      Most recent creatinine is 1 3 mg/dL  Which may be new baseline      - Urine eos 0%;   - A1c 5 6% in nov  - UA 10-20  -U PCR stable  0 11  -SPEP unrevealing  -UPEP unrevealing  - C3, C4 unrevealing  - Renal u/s with R 10 3 cm, L 10 4 cm, renal cortex 1 cm b/l; both kidneys slightly reduced in size; lower pole of R kidney is non obstructing calculus  - Pt follows with Urology team for nephrolithiasis  - No NSAIDs, the patient is not currently taking NSAID  -nuc stress test per Cardiology in [place]  - repeat renal u/s 1/2019 - unrevealing with exception of renal cortical atrophy; but also 6 mm non shadowing calculus  -bumex increased for cardiology team to 2 mg in the morning and 2 mg 3 times a week in the evening also  Renal function is stable since the changes   -lab work with BMP in 3 months     2) SOB - Volume - follows with Cardiology     -patient is following closely with Pulmonary and Cardiology  -diuretic increased as above     3) HTN -      - cont losartan, propranolol, bumex  - cont holding spironolactone and amlodipine     4) hypothyroid - as per Primary Care Physician     5) HPL - Primary Care following       6) Electrolytes - stable     7) MBD -Vit D 48 appropriate 2018   Phos 3 7 -- appropriate       - PTH rising 50s - monitor for now  - recheck next visit in 3 months --> check Vit D     8) gout -      - no longer on allopurinol  -patient only takes colchicine up to 3 doses max when has a gout flare     9) back pain - follows with Pain management     10) Colonoscopy in feb with internal hemorrhoids and polyp removed       11) alk phos elevation     - stable, but still elevated  - LFTs nl in may  - was started on allopurinol since last check when was normal  Could be related to allopurinol     12) Anemia - Hb stable     -hemoglobin stable y  -low iron sat - started on iron supplement in may 2019     13) Mitral valve calcification     - per Cardiology team     14) elevated D-dimer-patient was given duplex of lower extremities which was unrevealing    15) fall - no LOC  - pt did not see assistance  4 weeks ago  Pain still present on L patella region  - check xray of knee  - messaged Ortho and PCP team  And we are assisting pt to call Ortho team     It was a pleasure evaluating your patient in the office today  Thank you for allowing our team to participate in the care of Ms Gunjan Romero  Please do not hesitate to contact our team if further issues/questions shall arise in the interim         No problem-specific Assessment & Plan notes found for this encounter  HPI:    Pt with L knee pain  Denies other complaints  Fall 7/5  PATIENT INSTRUCTIONS:    Patient Instructions   1) Avoid NSAIDS - (Example - motrin, advil, ibuprofen, aleve, exederin, etc)  2) Always follow a low salt diet  3) please have Xray completed and see Dr Freddy Valle -  [phone]  4) please have labwork in 3 months  5) appointment in 3- 4 months        OBJECTIVE:  Current Weight: Weight - Scale: 96 6 kg (213 lb)  Vitals:    08/02/19 1107   BP: 144/72   BP Location: Right arm   Patient Position: Sitting   Cuff Size: Adult   Pulse: 70   Weight: 96 6 kg (213 lb)   Height: 5' 0 25" (1 53 m)    Body mass index is 41 25 kg/m²  REVIEW OF SYSTEMS:    Review of Systems   Constitutional: Negative  Negative for fatigue  HENT: Negative  Eyes: Negative  Respiratory: Negative  Negative for shortness of breath  Cardiovascular: Negative  Negative for leg swelling  Gastrointestinal: Negative  Endocrine: Negative  Genitourinary: Negative  Negative for difficulty urinating  Musculoskeletal: Negative      Skin: Negative  Allergic/Immunologic: Negative  Neurological: Negative  Hematological: Negative  Psychiatric/Behavioral: Negative  All other systems reviewed and are negative  PHYSICAL EXAM:      Physical Exam   Constitutional: She is oriented to person, place, and time  She appears well-developed and well-nourished  No distress  HENT:   Head: Normocephalic and atraumatic  Mouth/Throat: No oropharyngeal exudate  Eyes: Conjunctivae are normal  Right eye exhibits no discharge  Left eye exhibits no discharge  No scleral icterus  Neck: Normal range of motion  Neck supple  Cardiovascular: Normal rate and regular rhythm  Exam reveals no gallop and no friction rub  No murmur heard  Pulmonary/Chest: Effort normal and breath sounds normal  No respiratory distress  She has no wheezes  She has no rales  Abdominal: Soft  Bowel sounds are normal  She exhibits no distension  There is no tenderness  There is no rebound  Musculoskeletal: Normal range of motion  She exhibits edema and tenderness  She exhibits no deformity  L knee pain  Ecchymosis  Neurological: She is alert and oriented to person, place, and time  Coordination normal    Skin: Skin is warm and dry  No rash noted  She is not diaphoretic  No erythema  No pallor  Psychiatric: She has a normal mood and affect  Her behavior is normal  Judgment and thought content normal    Nursing note and vitals reviewed        Medications:    Current Outpatient Medications:     aspirin 81 MG tablet, Take 81 mg by mouth every morning  , Disp: , Rfl:     bumetanide (BUMEX) 2 mg tablet, Take 2 mg daily in AM except on M-W-F take 2mg twice a day, Disp: 135 tablet, Rfl: 1    cholecalciferol (VITAMIN D3) 1,000 units tablet, Take 1 tablet by mouth daily, Disp: , Rfl:     ferrous sulfate 324 (65 Fe) mg, Take 1 tablet (324 mg total) by mouth daily before breakfast, Disp: 30 tablet, Rfl: 3    gabapentin (NEURONTIN) 300 mg capsule, Take 1 capsule (300 mg total) by mouth daily at bedtime, Disp: 90 capsule, Rfl: 0    levothyroxine 112 mcg tablet, TAKE 1 TABLET EVERY DAY, Disp: 90 tablet, Rfl: 1    losartan (COZAAR) 25 mg tablet, Take 1 tablet (25 mg total) by mouth daily, Disp: 90 tablet, Rfl: 3    Omega-3-acid Ethyl Esters & D3 1 & 1000 GM & UNIT KIT, Take 2 capsules by mouth 2 (two) times a day, Disp: , Rfl:     omeprazole (PriLOSEC) 20 mg delayed release capsule, Take 1 capsule (20 mg total) by mouth every morning, Disp: 90 capsule, Rfl: 1    propranolol (INDERAL) 80 mg tablet, TAKE 1/2 TABLET EVERY 12 HOURS DAILY, Disp: 90 tablet, Rfl: 3    allopurinol (ZYLOPRIM) 300 mg tablet, Take 1 tablet (300 mg total) by mouth daily (Patient not taking: Reported on 8/2/2019), Disp: 90 tablet, Rfl: 1    clobetasol (TEMOVATE) 0 05 % ointment, Apply topically 2 (two) times a day (Patient not taking: Reported on 8/2/2019), Disp: 30 g, Rfl: 1    diclofenac sodium (VOLTAREN) 1 %, Apply 2 g topically 4 (four) times a day (Patient not taking: Reported on 8/2/2019), Disp: 100 g, Rfl: 0    halobetasol (ULTRAVATE) 0 05 % cream, , Disp: , Rfl: 2    Laboratory Results:  Results from last 7 days   Lab Units 07/30/19  0739   WBC Thousand/uL 6 85   HEMOGLOBIN g/dL 10 8*   HEMATOCRIT % 35 6   PLATELETS Thousands/uL 187   POTASSIUM mmol/L 3 7   CHLORIDE mmol/L 106   CO2 mmol/L 31   BUN mg/dL 31*   CREATININE mg/dL 1 32*   CALCIUM mg/dL 9 1   PHOSPHORUS mg/dL 2 9       Results for orders placed or performed in visit on 22/22/62   Basic metabolic panel   Result Value Ref Range    Sodium 144 136 - 145 mmol/L    Potassium 3 7 3 5 - 5 3 mmol/L    Chloride 106 100 - 108 mmol/L    CO2 31 21 - 32 mmol/L    ANION GAP 7 4 - 13 mmol/L    BUN 31 (H) 5 - 25 mg/dL    Creatinine 1 32 (H) 0 60 - 1 30 mg/dL    Glucose, Fasting 101 (H) 65 - 99 mg/dL    Calcium 9 1 8 3 - 10 1 mg/dL    eGFR 38 ml/min/1 73sq m   CBC   Result Value Ref Range    WBC 6 85 4 31 - 10 16 Thousand/uL    RBC 4 00 3 81 - 5 12 Million/uL    Hemoglobin 10 8 (L) 11 5 - 15 4 g/dL    Hematocrit 35 6 34 8 - 46 1 %    MCV 89 82 - 98 fL    MCH 27 0 26 8 - 34 3 pg    MCHC 30 3 (L) 31 4 - 37 4 g/dL    RDW 15 8 (H) 11 6 - 15 1 %    Platelets 834 480 - 307 Thousands/uL    MPV 12 1 8 9 - 12 7 fL   Phosphorus   Result Value Ref Range    Phosphorus 2 9 2 3 - 4 1 mg/dL   Protein / creatinine ratio, urine   Result Value Ref Range    Creatinine, Ur 44 8 mg/dL    Protein Urine Random 7 mg/dL    Prot/Creat Ratio, Ur 0 16 (H) 0 00 - 0 10   PTH, intact   Result Value Ref Range     0 (H) 18 4 - 80 1 pg/mL   Urinalysis with microscopic   Result Value Ref Range    Clarity, UA Clear     Color, UA Yellow     Specific Gravity, UA 1 009 1 003 - 1 030    pH, UA 6 5 4 5, 5 0, 5 5, 6 0, 6 5, 7 0, 7 5, 8 0    Glucose, UA Negative Negative mg/dl    Ketones, UA Negative Negative mg/dl    Blood, UA Negative Negative    Protein, UA Negative Negative mg/dl    Nitrite, UA Negative Negative    Bilirubin, UA Negative Negative    Urobilinogen, UA 0 2 0 2, 1 0 E U /dl E U /dl    Leukocytes, UA Trace (A) Negative    WBC, UA 2-4 (A) None Seen, 0-5, 5-55, 5-65 /hpf    RBC, UA None Seen None Seen, 0-5 /hpf    Hyaline Casts, UA None Seen None Seen /lpf    Bacteria, UA Occasional None Seen, Occasional /hpf    Epithelial Cells None Seen None Seen, Occasional /hpf

## 2019-08-02 NOTE — PROGRESS NOTES
NEPHROLOGY OFFICE VISIT   William Layton 77 y o  female MRN: [id number]  8/2/2019    Reason for Visit: CKD III    ASSESSMENT and PLAN:    I had the pleasure of seeing Ms Meng Justice today in the renal clinic for the continued management of CKD III  Since our last visit, there has been no ER visits or hospitalizations  He currently has no complaints at this time and is feeling well  Patient denies any chest pain, shortness of breath and swelling  The last blood work was done on 7/30/19, which we have reviewed together  48-year-old female with a past medical history of chronic kidney disease, venous stasis, HTN, hypothyroidism, lumbar canal stenosis, Anxiety, GERD, neuropathy, Gout, EF 40-53%, Grade 2 diastolic dysfunction who presents for follow up for CKD  To note, patient's  has now passed away in August      1) CKD - Prior year had nl Cr prior to aug 2016 and then had SHABANA during diarrhea episode  To note, patient also has a long standing history of HTN  Creatinine in 2013, 0 9 mg/dL  Cr early 2016 was 0 80-0 9 mg/dL; then increased starting in august 2016 to 2 3 mg/dL and has fluctuated since  CT scan in 2016, kidneys appearing normal at that time  UA in august was bland       Etiology of CKD may be long standing HTN and ATN  Baseline Cr ~ 1 1 -1 3 mg/dL, but recently in October 2018 the creatinine was rising to 1 7 mg/dL and this was thought to be a new baseline; but Cr was rising to 2 04  This is felt to be volume mediated and therefore diuretics were adjusted and lowered slightly with the holding of spironolactone and also there is question of perfusion issues and amlodipine was also held and patient's creatinine has returned to upper limit of baseline      Most recent creatinine is 1 3 mg/dL    Which may be new baseline      - Urine eos 0%;   - A1c 5 6% in nov  - UA 10-20  -U PCR stable  0 11  -SPEP unrevealing  -UPEP unrevealing  - C3, C4 unrevealing  - Renal u/s with R 10 3 cm, L 10 4 cm, renal cortex 1 cm b/l; both kidneys slightly reduced in size; lower pole of R kidney is non obstructing calculus  - Pt follows with Urology team for nephrolithiasis  - No NSAIDs, the patient is not currently taking NSAID  -nuc stress test per Cardiology in [place]  - repeat renal u/s 1/2019 - unrevealing with exception of renal cortical atrophy; but also 6 mm non shadowing calculus  -bumex increased for cardiology team to 2 mg in the morning and 2 mg 3 times a week in the evening also  Renal function is stable since the changes   -lab work with BMP in 3 months     2) SOB - Volume - follows with Cardiology     -patient is following closely with Pulmonary and Cardiology  -diuretic increased as above     3) HTN -      - cont losartan, propranolol, bumex  - cont holding spironolactone and amlodipine     4) hypothyroid - as per Primary Care Physician     5) HPL - Primary Care following       6) Electrolytes - stable     7) MBD -Vit D 48 appropriate 2018  Phos 3 7 -- appropriate       - PTH rising 50s - monitor for now  - recheck next visit in 3 months --> check Vit D     8) gout -      - no longer on allopurinol  -patient only takes colchicine up to 3 doses max when has a gout flare     9) back pain - follows with Pain management     10) Colonoscopy in feb with internal hemorrhoids and polyp removed       11) alk phos elevation     - stable, but still elevated  - LFTs nl in may  - was started on allopurinol since last check when was normal  Could be related to allopurinol     12) Anemia - Hb stable     -hemoglobin stable y  -low iron sat - started on iron supplement in may 2019     13) Mitral valve calcification     - per Cardiology team     14) elevated D-dimer-patient was given duplex of lower extremities which was unrevealing    15) fall - no LOC  - pt did not see assistance  4 weeks ago   Pain still present on L patella region  - check xray of knee  - messaged Ortho and PCP team  And we are assisting pt to call Ortho team     It was a pleasure evaluating your patient in the office today  Thank you for allowing our team to participate in the care of Ms Angle Mccrary  Please do not hesitate to contact our team if further issues/questions shall arise in the interim         No problem-specific Assessment & Plan notes found for this encounter  HPI:    Pt with L knee pain  Denies other complaints  Fall 7/5  PATIENT INSTRUCTIONS:    Patient Instructions   1) Avoid NSAIDS - (Example - motrin, advil, ibuprofen, aleve, exederin, etc)  2) Always follow a low salt diet  3) please have Xray completed and see Dr Pia George -  [phone]  4) please have labwork in 3 months  5) appointment in 3- 4 months        OBJECTIVE:  Current Weight: Weight - Scale: 96 6 kg (213 lb)  Vitals:    08/02/19 1107   BP: 144/72   BP Location: Right arm   Patient Position: Sitting   Cuff Size: Adult   Pulse: 70   Weight: 96 6 kg (213 lb)   Height: 5' 0 25" (1 53 m)    Body mass index is 41 25 kg/m²  REVIEW OF SYSTEMS:    Review of Systems   Constitutional: Negative  Negative for fatigue  HENT: Negative  Eyes: Negative  Respiratory: Negative  Negative for shortness of breath  Cardiovascular: Negative  Negative for leg swelling  Gastrointestinal: Negative  Endocrine: Negative  Genitourinary: Negative  Negative for difficulty urinating  Musculoskeletal: Negative  Skin: Negative  Allergic/Immunologic: Negative  Neurological: Negative  Hematological: Negative  Psychiatric/Behavioral: Negative  All other systems reviewed and are negative  PHYSICAL EXAM:      Physical Exam   Constitutional: She is oriented to person, place, and time  She appears well-developed and well-nourished  No distress  HENT:   Head: Normocephalic and atraumatic  Mouth/Throat: No oropharyngeal exudate  Eyes: Conjunctivae are normal  Right eye exhibits no discharge  Left eye exhibits no discharge  No scleral icterus  Neck: Normal range of motion  Neck supple  Cardiovascular: Normal rate and regular rhythm  Exam reveals no gallop and no friction rub  No murmur heard  Pulmonary/Chest: Effort normal and breath sounds normal  No respiratory distress  She has no wheezes  She has no rales  Abdominal: Soft  Bowel sounds are normal  She exhibits no distension  There is no tenderness  There is no rebound  Musculoskeletal: Normal range of motion  She exhibits edema and tenderness  She exhibits no deformity  L knee pain  Ecchymosis  Neurological: She is alert and oriented to person, place, and time  Coordination normal    Skin: Skin is warm and dry  No rash noted  She is not diaphoretic  No erythema  No pallor  Psychiatric: She has a normal mood and affect  Her behavior is normal  Judgment and thought content normal    Nursing note and vitals reviewed        Medications:    Current Outpatient Medications:     aspirin 81 MG tablet, Take 81 mg by mouth every morning  , Disp: , Rfl:     bumetanide (BUMEX) 2 mg tablet, Take 2 mg daily in AM except on M-W-F take 2mg twice a day, Disp: 135 tablet, Rfl: 1    cholecalciferol (VITAMIN D3) 1,000 units tablet, Take 1 tablet by mouth daily, Disp: , Rfl:     ferrous sulfate 324 (65 Fe) mg, Take 1 tablet (324 mg total) by mouth daily before breakfast, Disp: 30 tablet, Rfl: 3    gabapentin (NEURONTIN) 300 mg capsule, Take 1 capsule (300 mg total) by mouth daily at bedtime, Disp: 90 capsule, Rfl: 0    levothyroxine 112 mcg tablet, TAKE 1 TABLET EVERY DAY, Disp: 90 tablet, Rfl: 1    losartan (COZAAR) 25 mg tablet, Take 1 tablet (25 mg total) by mouth daily, Disp: 90 tablet, Rfl: 3    Omega-3-acid Ethyl Esters & D3 1 & 1000 GM & UNIT KIT, Take 2 capsules by mouth 2 (two) times a day, Disp: , Rfl:     omeprazole (PriLOSEC) 20 mg delayed release capsule, Take 1 capsule (20 mg total) by mouth every morning, Disp: 90 capsule, Rfl: 1    propranolol (INDERAL) 80 mg tablet, TAKE 1/2 TABLET EVERY 12 HOURS DAILY, Disp: 90 tablet, Rfl: 3    allopurinol (ZYLOPRIM) 300 mg tablet, Take 1 tablet (300 mg total) by mouth daily (Patient not taking: Reported on 8/2/2019), Disp: 90 tablet, Rfl: 1    clobetasol (TEMOVATE) 0 05 % ointment, Apply topically 2 (two) times a day (Patient not taking: Reported on 8/2/2019), Disp: 30 g, Rfl: 1    diclofenac sodium (VOLTAREN) 1 %, Apply 2 g topically 4 (four) times a day (Patient not taking: Reported on 8/2/2019), Disp: 100 g, Rfl: 0    halobetasol (ULTRAVATE) 0 05 % cream, , Disp: , Rfl: 2    Laboratory Results:  Results from last 7 days   Lab Units 07/30/19  0739   WBC Thousand/uL 6 85   HEMOGLOBIN g/dL 10 8*   HEMATOCRIT % 35 6   PLATELETS Thousands/uL 187   POTASSIUM mmol/L 3 7   CHLORIDE mmol/L 106   CO2 mmol/L 31   BUN mg/dL 31*   CREATININE mg/dL 1 32*   CALCIUM mg/dL 9 1   PHOSPHORUS mg/dL 2 9       Results for orders placed or performed in visit on 00/65/73   Basic metabolic panel   Result Value Ref Range    Sodium 144 136 - 145 mmol/L    Potassium 3 7 3 5 - 5 3 mmol/L    Chloride 106 100 - 108 mmol/L    CO2 31 21 - 32 mmol/L    ANION GAP 7 4 - 13 mmol/L    BUN 31 (H) 5 - 25 mg/dL    Creatinine 1 32 (H) 0 60 - 1 30 mg/dL    Glucose, Fasting 101 (H) 65 - 99 mg/dL    Calcium 9 1 8 3 - 10 1 mg/dL    eGFR 38 ml/min/1 73sq m   CBC   Result Value Ref Range    WBC 6 85 4 31 - 10 16 Thousand/uL    RBC 4 00 3 81 - 5 12 Million/uL    Hemoglobin 10 8 (L) 11 5 - 15 4 g/dL    Hematocrit 35 6 34 8 - 46 1 %    MCV 89 82 - 98 fL    MCH 27 0 26 8 - 34 3 pg    MCHC 30 3 (L) 31 4 - 37 4 g/dL    RDW 15 8 (H) 11 6 - 15 1 %    Platelets 356 406 - 191 Thousands/uL    MPV 12 1 8 9 - 12 7 fL   Phosphorus   Result Value Ref Range    Phosphorus 2 9 2 3 - 4 1 mg/dL   Protein / creatinine ratio, urine   Result Value Ref Range    Creatinine, Ur 44 8 mg/dL    Protein Urine Random 7 mg/dL    Prot/Creat Ratio, Ur 0 16 (H) 0 00 - 0 10   PTH, intact   Result Value Ref Range     0 (H) 18 4 - 80 1 pg/mL   Urinalysis with microscopic   Result Value Ref Range    Clarity, UA Clear     Color, UA Yellow     Specific Gravity, UA 1 009 1 003 - 1 030    pH, UA 6 5 4 5, 5 0, 5 5, 6 0, 6 5, 7 0, 7 5, 8 0    Glucose, UA Negative Negative mg/dl    Ketones, UA Negative Negative mg/dl    Blood, UA Negative Negative    Protein, UA Negative Negative mg/dl    Nitrite, UA Negative Negative    Bilirubin, UA Negative Negative    Urobilinogen, UA 0 2 0 2, 1 0 E U /dl E U /dl    Leukocytes, UA Trace (A) Negative    WBC, UA 2-4 (A) None Seen, 0-5, 5-55, 5-65 /hpf    RBC, UA None Seen None Seen, 0-5 /hpf    Hyaline Casts, UA None Seen None Seen /lpf    Bacteria, UA Occasional None Seen, Occasional /hpf    Epithelial Cells None Seen None Seen, Occasional /hpf

## 2019-08-05 ENCOUNTER — TELEPHONE (OUTPATIENT)
Dept: NEPHROLOGY | Facility: CLINIC | Age: 79
End: 2019-08-05

## 2019-08-05 NOTE — RESULT ENCOUNTER NOTE
Hello    Patient normally is followed up by Ms Andry Gordon  Can pt be notified that there are no fractures  Did patient hear back from Ortho office for appt?  If not, can we reach out to her PCP to inform of xray findings with no fracture and they can determine if pt needs to see them or ortho for knee pain    Thank you    np

## 2019-08-05 NOTE — TELEPHONE ENCOUNTER
----- Message from Staci Kramer MD sent at 8/5/2019  3:31 PM EDT -----  Hello    Patient normally is followed up by Ms Pollock Poster  Can pt be notified that there are no fractures  Did patient hear back from Ortho office for appt?  If not, can we reach out to her PCP to inform of xray findings with no fracture and they can determine if pt needs to see them or ortho for knee pain    Thank you    np

## 2019-08-07 ENCOUNTER — OFFICE VISIT (OUTPATIENT)
Dept: OBGYN CLINIC | Facility: CLINIC | Age: 79
End: 2019-08-07
Payer: MEDICARE

## 2019-08-07 VITALS
SYSTOLIC BLOOD PRESSURE: 147 MMHG | WEIGHT: 213.6 LBS | HEART RATE: 67 BPM | DIASTOLIC BLOOD PRESSURE: 69 MMHG | HEIGHT: 61 IN | BODY MASS INDEX: 40.33 KG/M2

## 2019-08-07 DIAGNOSIS — S80.02XA CONTUSION OF LEFT KNEE, INITIAL ENCOUNTER: Primary | ICD-10-CM

## 2019-08-07 DIAGNOSIS — M17.12 PRIMARY OSTEOARTHRITIS OF LEFT KNEE: ICD-10-CM

## 2019-08-07 PROCEDURE — 20610 DRAIN/INJ JOINT/BURSA W/O US: CPT | Performed by: ORTHOPAEDIC SURGERY

## 2019-08-07 PROCEDURE — 99214 OFFICE O/P EST MOD 30 MIN: CPT | Performed by: ORTHOPAEDIC SURGERY

## 2019-08-07 RX ORDER — DEXAMETHASONE SODIUM PHOSPHATE 100 MG/10ML
40 INJECTION INTRAMUSCULAR; INTRAVENOUS
Status: COMPLETED | OUTPATIENT
Start: 2019-08-07 | End: 2019-08-07

## 2019-08-07 RX ORDER — LIDOCAINE HYDROCHLORIDE 10 MG/ML
4 INJECTION, SOLUTION INFILTRATION; PERINEURAL
Status: COMPLETED | OUTPATIENT
Start: 2019-08-07 | End: 2019-08-07

## 2019-08-07 RX ADMIN — LIDOCAINE HYDROCHLORIDE 4 ML: 10 INJECTION, SOLUTION INFILTRATION; PERINEURAL at 12:22

## 2019-08-07 RX ADMIN — DEXAMETHASONE SODIUM PHOSPHATE 40 MG: 100 INJECTION INTRAMUSCULAR; INTRAVENOUS at 12:22

## 2019-08-07 NOTE — PROGRESS NOTES
Assessment/Plan:  1  Contusion of left knee, initial encounter     2  Primary osteoarthritis of left knee         Scribe Attestation    I,:   Raheem Piper am acting as a scribe while in the presence of the attending physician :        I,:   Bonnie Diaz MD personally performed the services described in this documentation    as scribed in my presence :              Rosalind upon examination review of the x-rays of her left knee demonstrates signs and symptoms consistent with exacerbation of her tricompartmental osteoarthritis due to a knee contusion that she suffered 4 weeks ago  She does have reasonable range of motion is grossly stable on examination today  Due to her painful symptoms I did offer a corticosteroid injection with the hopes of alleviating some of the arthritic pain that she is experiencing  She did opt for the corticosteroid injection today and tolerated with no complications  I did advise her to continue to be active and walk as tolerated  She may follow up with me on as-needed basis  Large joint arthrocentesis: L knee  Date/Time: 8/7/2019 12:22 PM  Consent given by: patient  Site marked: site marked  Timeout: Immediately prior to procedure a time out was called to verify the correct patient, procedure, equipment, support staff and site/side marked as required   Supporting Documentation  Indications: pain   Procedure Details  Location: knee - L knee  Preparation: Patient was prepped and draped in the usual sterile fashion  Needle size: 22 G  Ultrasound guidance: no  Approach: anterolateral  Medications administered: 4 mL lidocaine 1 %; 40 mg dexamethasone 100 mg/10 mL    Patient tolerance: patient tolerated the procedure well with no immediate complications  Dressing:  Sterile dressing applied            Subjective:   Deric Pena is a 77 y o  female who presents to the office today for follow-up evaluation of her left knee    She has a history of tricompartmental osteoarthritis that she has been treated for the past with Euflexxa injections  She states that she did suffer a fall 4 weeks ago that resulted subsequent bruising and swelling into the left lower leg  She states she lost her balance and tripped and hit her leg onto the bed  She states that her range of motion in swelling and pain has improved however she is experiencing intermittent and mild to moderate sharp pain about the anterior aspect of her knee to direct pressure particularly along the medial lateral aspect of the patella  She is able to ambulate however this does exacerbate her painful symptoms  She notes that her pain is better while at rest   She notes that she has been icing her knee and does note that this has been helping however feels that her knee is warm to the touch and painful  She denies any recent fevers or chills or tick bites  Today she denies any distal paresthesias  Review of Systems   Constitutional: Negative for chills, fever and unexpected weight change  HENT: Negative for hearing loss, nosebleeds and sore throat  Eyes: Negative for pain, redness and visual disturbance  Respiratory: Positive for shortness of breath  Negative for cough and wheezing  Cardiovascular: Positive for leg swelling  Negative for chest pain and palpitations  Gastrointestinal: Negative for abdominal pain, nausea and vomiting  Endocrine: Negative for polydipsia and polyuria  Genitourinary: Negative for dysuria and hematuria  Musculoskeletal: Positive for arthralgias  See HPI   Skin: Positive for rash  Negative for wound  Neurological: Negative for dizziness, numbness and headaches  Psychiatric/Behavioral: Negative for decreased concentration and suicidal ideas  The patient is nervous/anxious            Past Medical History:   Diagnosis Date    Anxiety     Arthritis     joints    Cataract     Disease of thyroid gland     Eczema     GERD (gastroesophageal reflux disease)     Gout     Heart failure ([facility] [address] )     Hepatitis     Hiatal hernia     Hypertension     Onychomycosis     last assessed: 16    Orthopnea     resolved: 16    Sleep apnea     wears CPAP       Past Surgical History:   Procedure Laterality Date    ABDOMINAL SURGERY          BREAST SURGERY Right     cyst removal    CARPAL TUNNEL RELEASE Left     CARPAL TUNNEL RELEASE Right     CATARACT EXTRACTION       SECTION  1960    x1    COLONOSCOPY N/A 2018    Procedure: COLONOSCOPY;  Surgeon: Kiara Gastelum MD;  Location: [facility] GI LAB; Service: Gastroenterology    DILATION AND CURETTAGE OF UTERUS  1967    EYE SURGERY Left 2006    cataracts    HERNIA REPAIR      umbilical hernia    HYSTERECTOMY      INCISIONAL HERNIA REPAIR      incarcerated    KNEE ARTHROSCOPY Right     MA REMV CATARACT EXTRACAP,INSERT LENS Right 3/27/2017    Procedure: EXTRACTION EXTRACAPSULAR CATARACT PHACO INTRAOCULAR LENS (IOL);   Surgeon: Polly Perez MD;  Location: [facility] MAIN OR;  Service: Ophthalmology       Family History   Problem Relation Age of Onset    Diabetes Mother     Coronary artery disease Mother     Arthritis Mother     Hypertension Mother     Hypertension Father     Diabetes Brother     Diabetes Son     Arthritis Family        Social History     Occupational History    Not on file   Tobacco Use    Smoking status: Never Smoker    Smokeless tobacco: Never Used   Substance and Sexual Activity    Alcohol use: No    Drug use: No    Sexual activity: Not on file         Current Outpatient Medications:     aspirin 81 MG tablet, Take 81 mg by mouth every morning  , Disp: , Rfl:     bumetanide (BUMEX) 2 mg tablet, Take 2 mg daily in AM except on  take 2mg twice a day, Disp: 135 tablet, Rfl: 1    cholecalciferol (VITAMIN D3) 1,000 units tablet, Take 1 tablet by mouth daily, Disp: , Rfl:     ferrous sulfate 324 (65 Fe) mg, Take 1 tablet (324 mg total) by mouth daily before breakfast, Disp: 30 tablet, Rfl: 3    gabapentin (NEURONTIN) 300 mg capsule, Take 1 capsule (300 mg total) by mouth daily at bedtime, Disp: 90 capsule, Rfl: 0    halobetasol (ULTRAVATE) 0 05 % cream, , Disp: , Rfl: 2    levothyroxine 112 mcg tablet, TAKE 1 TABLET EVERY DAY, Disp: 90 tablet, Rfl: 1    losartan (COZAAR) 25 mg tablet, Take 1 tablet (25 mg total) by mouth daily, Disp: 90 tablet, Rfl: 3    Omega-3-acid Ethyl Esters & D3 1 & 1000 GM & UNIT KIT, Take 2 capsules by mouth 2 (two) times a day, Disp: , Rfl:     omeprazole (PriLOSEC) 20 mg delayed release capsule, Take 1 capsule (20 mg total) by mouth every morning, Disp: 90 capsule, Rfl: 1    propranolol (INDERAL) 80 mg tablet, TAKE 1/2 TABLET EVERY 12 HOURS DAILY, Disp: 90 tablet, Rfl: 3    No Known Allergies    Objective:  Vitals:    08/07/19 1146   BP: 147/69   Pulse: 67       Left Knee Exam     Tenderness   The patient is experiencing tenderness in the medial joint line, lateral joint line and patella  Range of Motion   Extension: 0   Flexion: 110     Tests   Varus: negative Valgus: negative  Drawer:  Anterior - negative     Posterior - negative    Other   Erythema: absent  Scars: absent  Sensation: normal  Pulse: present  Effusion: effusion (Plus one) present          Observations   Left Knee   Positive for effusion (Plus one)  Physical Exam   Constitutional: She is oriented to person, place, and time  She appears well-developed and well-nourished  HENT:   Head: Normocephalic and atraumatic  Eyes: Conjunctivae are normal  Right eye exhibits no discharge  Left eye exhibits no discharge  Neck: Normal range of motion  Neck supple  Cardiovascular: Normal rate and intact distal pulses  Pulmonary/Chest: Effort normal  No respiratory distress  Musculoskeletal:        Left knee: She exhibits effusion (Plus one)  As noted in HPI   Neurological: She is alert and oriented to person, place, and time  Skin: Skin is warm and dry     Psychiatric: She has a normal mood and affect  Her behavior is normal  Judgment and thought content normal    Vitals reviewed  I have personally reviewed pertinent films in PACS and my interpretation is as follows:    X-ray of the left knee demonstrates moderate tricompartmental osteoarthritis with osteophytes at the medial and patellofemoral compartments  Chondrocalcinosis is also present

## 2019-08-08 NOTE — TELEPHONE ENCOUNTER
Patient called and stated that she went to the ortho doctor and she got an injection in her knee  She said she is feeling a little better

## 2019-08-23 ENCOUNTER — PATIENT OUTREACH (OUTPATIENT)
Dept: FAMILY MEDICINE CLINIC | Facility: CLINIC | Age: 79
End: 2019-08-23

## 2019-08-23 ENCOUNTER — OFFICE VISIT (OUTPATIENT)
Dept: PODIATRY | Facility: CLINIC | Age: 79
End: 2019-08-23
Payer: MEDICARE

## 2019-08-23 VITALS
HEIGHT: 61 IN | SYSTOLIC BLOOD PRESSURE: 143 MMHG | RESPIRATION RATE: 18 BRPM | BODY MASS INDEX: 40.22 KG/M2 | HEART RATE: 66 BPM | WEIGHT: 213 LBS | DIASTOLIC BLOOD PRESSURE: 71 MMHG

## 2019-08-23 DIAGNOSIS — L84 CORNS: ICD-10-CM

## 2019-08-23 DIAGNOSIS — I70.209 PERIPHERAL ARTERIOSCLEROSIS (HCC): Primary | ICD-10-CM

## 2019-08-23 DIAGNOSIS — M79.672 PAIN IN BOTH FEET: ICD-10-CM

## 2019-08-23 DIAGNOSIS — M79.671 PAIN IN BOTH FEET: ICD-10-CM

## 2019-08-23 PROCEDURE — 11056 PARNG/CUTG B9 HYPRKR LES 2-4: CPT | Performed by: PODIATRIST

## 2019-08-23 NOTE — PROGRESS NOTES
Outpatient Care Management Note:    Re: Pt returned call, was at podiatrist earlier today  She reports she is "feeling fine"  Reports a 3/10 pain in her knee, "comes and goes"  She reports she obtained some relief after injection at last orthopedic visit  Denies any additional falls and states she is getting used to living at her son's home  She denies shortness of breath and reports her swelling in her legs is "the same"  She states she has a home scale but does not weigh self regularly  She reports her last weight at doctors office was 213lb  Advised pt to weigh self tomorrow morning first thing in morning before breakfast with minimal amount of clothes and record  Advised to weigh self daily when possible  Pt agreeable to same  We reviewed s/s heart failure and to report weight gain of 3lbs in a day or 5lbs in a week  She states she is following a low salt diet  Agrees to next outreach call in one month

## 2019-08-23 NOTE — PROGRESS NOTES
Assessment   Pain upon ambulation   Mycosis of nail   Peripheral artery disease   Callus formation      Plan    All nails debrided   Plantar calluses debrided without pain or complication            History of Present Illness   HPI: Patient complains of pain in her feet with ambulation  She is pain around the big toe joints  There is no history of trauma  Today the patient is concerned with her swollen legs  She does not have pain in her legs, however, they are itchy and red  She suffers from edema  She does take water pill  Patient is now been diagnosed with renal compromise       Review of Systems        Constitutional: No fever, no chills, feels well, no tiredness, no recent weight gain or loss       Eyes: No complaints of eyesight problems, no red eyes       ENT: no loss of hearing, no nosebleeds, no sore throat       Cardiovascular: No complaints of chest pain, no palpitations, no leg claudication or lower extremity edema       Respiratory: no compliants of shortness of breath, no wheezing, no cough       Gastrointestinal: no complaints of abdominal pain, no constipation, no nausea or diarrhea, no vomiting, no bloody stools       Genitourinary: no complaints of dysuria, no incontinence       Musculoskeletal: arthralgias,-- limb pain-- and-- myalgias, but-- as noted in HPI       Integumentary: no complaints of skin rash or lesion, no itching or dry skin, no skin wounds       Neurological: numbness-- and-- tingling, but-- no complaints of headache, no confusion, no numbness or tingling, no dizziness-- and-- as noted in HPI       Endocrine: No complaints of muscle weakness, no feelings of weakness, no frequent urination, no excessive thirst-- and-- as noted in HPI   feelings of weakness      Psychiatric: No suicidal thoughts, no anxiety, no feelings of depression       Active Problems   1  Abdominal pain (789 00) (R10 9)   2  Acute bronchitis (466 0) (J20 9)   3  Acute on chronic diastolic congestive heart failure (428 33,428 0) (I50 33)   4  Ankle pain, unspecified laterality   5  Arthropathy of knee (716 96) (M17 10)   6  Atherosclerosis of arteries of extremities (440 20) (I70 209)   7  Benign essential hypertension (401 1) (I10)   8  Breast pain (611 71) (N64 4)   9  Bunion, unspecified laterality   10  Callus (700) (L84)   11  Cellulitis (682 9) (L03 90)   12  Chronic low back pain (724 2,338 29) (M54 5,G89 29)   13  Chronic reflux esophagitis (530 11) (K21 0)   14  Chronic venous insufficiency (459 81) (I87 2)   15  CKD (chronic kidney disease), stage III (585 3) (N18 3)   57  TIKNRWZU systolic and diastolic HF (heart failure) (428 40) (I50 40)   17  Dehydration (276 51) (E86 0)   18  Dermatitis (692 9) (L30 9)   19  Difficulty in walking (719 7) (R26 2)   20  Discoloration of nail (703 8) (L60 8)   21  Diverticulitis of colon (562 11) (K57 32)   22  Dizziness (780 4) (R42)   23  Dyspnea on exertion (786 09) (R06 09)   24  Dystrophic nail (703 8) (L60 3)   25  Dysuria (788 1) (R30 0)   26  Edema (782 3) (R60 9)   27  Electrolyte or fluid disorder (276 9) (E87 8)   28  Elevated triglycerides with high cholesterol (272 2) (E78 2)   29  Encounter for screening for cardiovascular disorders (V81 2) (Z13 6)   30  Encounter for screening mammogram for breast cancer (V76 12) (Z12 31)   31  Esophageal reflux (530 81) (K21 9)   32  Flu vaccine need (V04 81) (Z23)   33  Flu vaccine need (V04 81) (Z23)   34  Gout (274 9) (M10 9)   35  Headache (784 0) (R51)   36  Hiatal hernia (553 3) (K44 9)   37  Hyperlipemia (272 4) (E78 5)   38  Hypertension (401 9) (I10)   39  Hyperuricemia (790 6) (E79 0)   40  Hypothyroidism (244 9) (E03 9)   41  Infectious diarrhea (009 2) (A09)   42  Knee pain (719 46) (M25 569)   43  Leg swelling (729 81) (M79 89)   44  Localized osteoarthritis of knees, bilateral (715 36) (M17 0)   45  Lumbar canal stenosis (724 02) (M48 061)   46  Lumbar disc herniation with radiculopathy (722 10) (M51 16)   47  Lumbar radiculopathy (724 4) (M54 16)   48  Lumbar stenosis with neurogenic claudication (724 03) (M48 062)   49  Lymphedema (457 1) (I89 0)   50  Medicare annual wellness visit, initial (V70 0) (Z00 00)   51  Medicare annual wellness visit, subsequent (V70 0) (Z00 00)   52  Morbid obesity with body mass index of 40 0-44 9 in adult (278 01,V85 41)      (E66 01,Z68 41)   53  Nails Onychauxis (703 8)   54  Nausea (787 02) (R11 0)   55  Need for pneumococcal vaccination (V03 82) (Z23)   56  Need for prophylactic vaccination and inoculation against influenza (V04 81) (Z23)   57  Need for shingles vaccine (V04 89) (Z23)   58  Nephrolithiasis (592 0) (N20 0)   59  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)   60  Obstructive sleep apnea (327 23) (G47 33)   61  Onychomycosis (110 1) (B35 1)   62  Orthopnea (786 02) (R06 01)   63  Osteoarthritis, localized, knee (715 36) (M17 10)   64  Osteoporosis screening (V82 81) (Z13 820)   65  Other specified anxiety disorders (300 09) (F41 8)   66  Other specified inflammatory spondylopathies, 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) (M46 80)   67  Overweight (278 02) (E66 3)   68  Pain in both feet (729 5) (M79 671,M79 672)   69  Pain in wrist joint (719 43) (M25 539)   70  Palpitations (785 1) (R00 2)   71  Pes planus, unspecified laterality (734) (M21 40)   72  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)   73  Pseudogout of left wrist (888 87,493 66) (M11 232)   74  Renal disorder (593 9) (N28 9)   75  Rupture of popliteal cyst (727 51) (M66 0)   76  Screening for colon cancer (V76 51) (Z12 11)   77  Screening for diabetes mellitus (DM) (V77 1) (Z13 1)   78  Screening for genitourinary condition (V81 6) (Z13 89)   79  Screening for malignant neoplasm of breast (V76 10) (Z12 31)   80  Short unilateral neuralgiform headache, conjunctival injection/tearing (339 05) (G44 059)   81  Somnolence, daytime (780 54) (R40 0)   82  Spinal stenosis of lumbar region (724 02) (M48 061)   83  Tarsal tunnel syndrome (355 5) (G57 50)   84  Tenderness in limb (729 5) (M79 609)   85  Tinea pedis (110 4) (W99 3)   86  Umbilical hernia (542 3) (K42 9)   87  Visit for screening mammogram (V76 12) (Z12 31)     Past Medical History    · Benign essential hypertension (401 1) (I10)   · Depression screen (V79 0) (Z13 89)   · Hiatal hernia (553 3) (K44 9)   · History of abdominal pain (V13 89) (X31 315)   · History of abdominal pain (V13 89) (K93 384)   · History of arthritis (V13 4) (Z87 39)   · History of backache (V13 59) (Z87 39)   · History of cataract (V12 49) (Z86 69)   · History of eczema (V13 3) (Z87 2)   · History of heart failure (V12 59) (Z86 79)   · History of hepatitis (V12 09) (Z86 19)   · History of onychomycosis (V12 09) (Z86 19)   · History of sleep apnea (V13 89) (Z86 69)   · History of Orthopnea (786 02) (R06 01)     The active problems and past medical history were reviewed and updated today       Surgical History    · History of Complete Colonoscopy   · History of Hysterectomy   · History of Incisional Hernia Repair - Incarcerated   · History of Knee Surgery     The surgical history was reviewed and updated today        Family History   Mother    · Family history of Coronary artery disease   · Family history of arthritis (V17 7) (Z82 61)   · Denied: Family history of depression   · Family history of diabetes mellitus (V18 0) (Z83 3)   · Family history of hypertension (V17 49) (Z82 49)   · Denied: Family history of substance abuse  Father    · Denied: Family history of depression   · Family history of hypertension (V17 49) (Z82 49)   · Denied: Family history of substance abuse  Sibling    · Denied: Family history of depression   · Denied: Family history of substance abuse  Family History    · Family history of arthritis (V17 7) (Z82 61)     The family history was reviewed and updated today        Social History    · Denied: History of Alcohol Use (History)   · Daily Coffee Consumption (___ Cups/Day)   · Lack of exercise (V69 0) (Z72 3)   ·    · Never a smoker   · Never a smoker   · Sleeps 6 -7 hours a day  The social history was reviewed and updated today  The social history was reviewed and is unchanged     The medication list was reviewed and updated today       Allergies   1  No Known Drug Allergies     Physical Exam   Left Foot: Appearance: Normal except as noted: excessive pronation-- and-- pes planus     Right Foot: Appearance: Normal except as noted: excessive pronation-- and-- pes planus  Tenderness: None except the calcaneous,-- medial calcaneous-- and-- insertion of the plantar fascia     Left Ankle: Appearance: Normal except ecchymosis-- and-- swelling laterally  ROM: limited ROM in all planes    Right Ankle: ROM: limited ROM in all planes Motor: diffuse weakness     Neurological Exam: performed  Light touch was intact bilaterally  Vibratory sensation was intact bilaterally  Response to monofilament test was intact bilaterally  Deep tendon reflexes: patellar reflex present bilaterally-- and-- achilles reflex present bilaterally     Vascular Exam: performed Dorsalis pedis pulses were 1/4 bilaterally  Posterior tibial pulses were 1/4 bilaterally  Elevation Pallor: diminished bilaterally  Capillary refill time was greater than 3 seconds bilaterally-- and-- Q9 findings bilateral  Negative digital hair noted  Positive abnormal cooling bilateral  Edema: moderate bilaterally-- and-- 6/7 pitting edema  Negative Homans sign     Toenails: All of the toenails were elongated,-- hypertrophied,-- discolored-- and-- Mycotic with onychogryphosis  Note is made of bilateral tinea pedis in moccasin foot  Distribution          Socks and shoes removed, Right Foot Findings: swollen, erythematous and dry       The sensory exam showed diminished vibratory sensation at the level of the toes   Diminished tactile sensation with monofilament testing throughout the right foot       Socks and shoes removed, Left Foot Findings: swollen, erythematous and dry       The sensory exam showed diminished vibratory sensation at the level of the toes  Diminished tactile sensation with monofilament testing throughout the left foot      Capillary refills findings on the right were delayed in the toes       Pulses:      1+ in the posterior tibialis on the right      1+ in the dorsalis pedis on the right       Capillary refills findings on the left were delayed in the toes       Pulses:      1+ in the posterior tibialis on the left      1+ in the dorsalis pedis on the left       Assign Risk Category: 2: Loss of protective sensation with or without weakness, deformity, callus, pre-ulcer, or history of ulceration  High risk     Hyperkeratosis: present on both first toes,-- present on both first sub metatarsals-- and-- Bilateral plantar moccasin tinea pedis noted     Shoe Gear Evaluation: performed ()  Recommendation(s): SAS style       Patient's shoes and socks removed  Assign Risk Category:  Deformity present; Loss of protective sensation; Weak pulses       Risk: 2

## 2019-09-21 DIAGNOSIS — I10 BENIGN ESSENTIAL HTN: ICD-10-CM

## 2019-09-23 RX ORDER — PROPRANOLOL HYDROCHLORIDE 80 MG/1
TABLET ORAL
Qty: 90 TABLET | Refills: 3 | Status: SHIPPED | OUTPATIENT
Start: 2019-09-23 | End: 2020-02-29 | Stop reason: HOSPADM

## 2019-09-27 ENCOUNTER — PATIENT OUTREACH (OUTPATIENT)
Dept: FAMILY MEDICINE CLINIC | Facility: CLINIC | Age: 79
End: 2019-09-27

## 2019-09-27 NOTE — PROGRESS NOTES
Outpatient Care Management Note:    Re: no complex needs at this time  Message left for patient to contact care manager for any future needs  Will close to outpatient care management at this time

## 2019-10-25 ENCOUNTER — OFFICE VISIT (OUTPATIENT)
Dept: PODIATRY | Facility: CLINIC | Age: 79
End: 2019-10-25
Payer: MEDICARE

## 2019-10-25 VITALS
SYSTOLIC BLOOD PRESSURE: 138 MMHG | WEIGHT: 213 LBS | RESPIRATION RATE: 17 BRPM | BODY MASS INDEX: 40.22 KG/M2 | HEIGHT: 61 IN | HEART RATE: 62 BPM | DIASTOLIC BLOOD PRESSURE: 75 MMHG

## 2019-10-25 DIAGNOSIS — M79.672 PAIN IN BOTH FEET: ICD-10-CM

## 2019-10-25 DIAGNOSIS — L84 CORNS: ICD-10-CM

## 2019-10-25 DIAGNOSIS — M79.671 PAIN IN BOTH FEET: ICD-10-CM

## 2019-10-25 DIAGNOSIS — I70.209 PERIPHERAL ARTERIOSCLEROSIS (HCC): Primary | ICD-10-CM

## 2019-10-25 PROCEDURE — 11056 PARNG/CUTG B9 HYPRKR LES 2-4: CPT | Performed by: PODIATRIST

## 2019-10-25 NOTE — PROGRESS NOTES
Assessment   Pain upon ambulation   Mycosis of nail   Peripheral artery disease   Callus formation      Plan    All nails debrided   Plantar calluses debrided without pain or complication            History of Present Illness   HPI: Patient complains of pain in her feet with ambulation  She is pain around the big toe joints  There is no history of trauma  Today the patient is concerned with her swollen legs  She does not have pain in her legs, however, they are itchy and red  She suffers from edema  She does take water pill  Patient is now been diagnosed with renal compromise       Review of Systems        Constitutional: No fever, no chills, feels well, no tiredness, no recent weight gain or loss       Eyes: No complaints of eyesight problems, no red eyes       ENT: no loss of hearing, no nosebleeds, no sore throat       Cardiovascular: No complaints of chest pain, no palpitations, no leg claudication or lower extremity edema       Respiratory: no compliants of shortness of breath, no wheezing, no cough       Gastrointestinal: no complaints of abdominal pain, no constipation, no nausea or diarrhea, no vomiting, no bloody stools       Genitourinary: no complaints of dysuria, no incontinence       Musculoskeletal: arthralgias,-- limb pain-- and-- myalgias, but-- as noted in HPI       Integumentary: no complaints of skin rash or lesion, no itching or dry skin, no skin wounds       Neurological: numbness-- and-- tingling, but-- no complaints of headache, no confusion, no numbness or tingling, no dizziness-- and-- as noted in HPI       Endocrine: No complaints of muscle weakness, no feelings of weakness, no frequent urination, no excessive thirst-- and-- as noted in HPI   feelings of weakness      Psychiatric: No suicidal thoughts, no anxiety, no feelings of depression       Active Problems   1  Abdominal pain (789 00) (R10 9)   2  Acute bronchitis (466 0) (J20 9)   3  Acute on chronic diastolic congestive heart failure (428 33,428 0) (I50 33)   4  Ankle pain, unspecified laterality   5  Arthropathy of knee (716 96) (M17 10)   6  Atherosclerosis of arteries of extremities (440 20) (I70 209)   7  Benign essential hypertension (401 1) (I10)   8  Breast pain (611 71) (N64 4)   9  Bunion, unspecified laterality   10  Callus (700) (L84)   11  Cellulitis (682 9) (L03 90)   12  Chronic low back pain (724 2,338 29) (M54 5,G89 29)   13  Chronic reflux esophagitis (530 11) (K21 0)   14  Chronic venous insufficiency (459 81) (I87 2)   15  CKD (chronic kidney disease), stage III (585 3) (N18 3)   25  HBQQFLHV systolic and diastolic HF (heart failure) (428 40) (I50 40)   17  Dehydration (276 51) (E86 0)   18  Dermatitis (692 9) (L30 9)   19  Difficulty in walking (719 7) (R26 2)   20  Discoloration of nail (703 8) (L60 8)   21  Diverticulitis of colon (562 11) (K57 32)   22  Dizziness (780 4) (R42)   23  Dyspnea on exertion (786 09) (R06 09)   24  Dystrophic nail (703 8) (L60 3)   25  Dysuria (788 1) (R30 0)   26  Edema (782 3) (R60 9)   27  Electrolyte or fluid disorder (276 9) (E87 8)   28  Elevated triglycerides with high cholesterol (272 2) (E78 2)   29  Encounter for screening for cardiovascular disorders (V81 2) (Z13 6)   30  Encounter for screening mammogram for breast cancer (V76 12) (Z12 31)   31  Esophageal reflux (530 81) (K21 9)   32  Flu vaccine need (V04 81) (Z23)   33  Flu vaccine need (V04 81) (Z23)   34  Gout (274 9) (M10 9)   35  Headache (784 0) (R51)   36  Hiatal hernia (553 3) (K44 9)   37  Hyperlipemia (272 4) (E78 5)   38  Hypertension (401 9) (I10)   39  Hyperuricemia (790 6) (E79 0)   40  Hypothyroidism (244 9) (E03 9)   41  Infectious diarrhea (009 2) (A09)   42  Knee pain (719 46) (M25 569)   43  Leg swelling (729 81) (M79 89)   44  Localized osteoarthritis of knees, bilateral (715 36) (M17 0)   45  Lumbar canal stenosis (724 02) (M48 061)   46  Lumbar disc herniation with radiculopathy (722 10) (M51 16)   47  Lumbar radiculopathy (724 4) (M54 16)   48  Lumbar stenosis with neurogenic claudication (724 03) (M48 062)   49  Lymphedema (457 1) (I89 0)   50  Medicare annual wellness visit, initial (V70 0) (Z00 00)   51  Medicare annual wellness visit, subsequent (V70 0) (Z00 00)   52  Morbid obesity with body mass index of 40 0-44 9 in adult (278 01,V85 41)      (E66 01,Z68 41)   53  Nails Onychauxis (703 8)   54  Nausea (787 02) (R11 0)   55  Need for pneumococcal vaccination (V03 82) (Z23)   56  Need for prophylactic vaccination and inoculation against influenza (V04 81) (Z23)   57  Need for shingles vaccine (V04 89) (Z23)   58  Nephrolithiasis (592 0) (N20 0)   59  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)   60  Obstructive sleep apnea (327 23) (G47 33)   61  Onychomycosis (110 1) (B35 1)   62  Orthopnea (786 02) (R06 01)   63  Osteoarthritis, localized, knee (715 36) (M17 10)   64  Osteoporosis screening (V82 81) (Z13 820)   65  Other specified anxiety disorders (300 09) (F41 8)   66  Other specified inflammatory spondylopathies, 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) (M46 80)   67  Overweight (278 02) (E66 3)   68  Pain in both feet (729 5) (M79 671,M79 672)   69  Pain in wrist joint (719 43) (M25 539)   70  Palpitations (785 1) (R00 2)   71  Pes planus, unspecified laterality (734) (M21 40)   72  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)   73  Pseudogout of left wrist (839 16,083 72) (M11 232)   74  Renal disorder (593 9) (N28 9)   75  Rupture of popliteal cyst (727 51) (M66 0)   76  Screening for colon cancer (V76 51) (Z12 11)   77  Screening for diabetes mellitus (DM) (V77 1) (Z13 1)   78  Screening for genitourinary condition (V81 6) (Z13 89)   79  Screening for malignant neoplasm of breast (V76 10) (Z12 31)   80  Short unilateral neuralgiform headache, conjunctival injection/tearing (339 05) (G44 059)   81  Somnolence, daytime (780 54) (R40 0)   82  Spinal stenosis of lumbar region (724 02) (M48 061)   83  Tarsal tunnel syndrome (355 5) (G57 50)   84  Tenderness in limb (729 5) (M79 609)   85  Tinea pedis (110 4) (P58 3)   86  Umbilical hernia (084 0) (K42 9)   87  Visit for screening mammogram (V76 12) (Z12 31)     Past Medical History    · Benign essential hypertension (401 1) (I10)   · Depression screen (V79 0) (Z13 89)   · Hiatal hernia (553 3) (K44 9)   · History of abdominal pain (V13 89) (U92 050)   · History of abdominal pain (V13 89) (P79 689)   · History of arthritis (V13 4) (Z87 39)   · History of backache (V13 59) (Z87 39)   · History of cataract (V12 49) (Z86 69)   · History of eczema (V13 3) (Z87 2)   · History of heart failure (V12 59) (Z86 79)   · History of hepatitis (V12 09) (Z86 19)   · History of onychomycosis (V12 09) (Z86 19)   · History of sleep apnea (V13 89) (Z86 69)   · History of Orthopnea (786 02) (R06 01)     The active problems and past medical history were reviewed and updated today       Surgical History    · History of Complete Colonoscopy   · History of Hysterectomy   · History of Incisional Hernia Repair - Incarcerated   · History of Knee Surgery     The surgical history was reviewed and updated today        Family History   Mother    · Family history of Coronary artery disease   · Family history of arthritis (V17 7) (Z82 61)   · Denied: Family history of depression   · Family history of diabetes mellitus (V18 0) (Z83 3)   · Family history of hypertension (V17 49) (Z82 49)   · Denied: Family history of substance abuse  Father    · Denied: Family history of depression   · Family history of hypertension (V17 49) (Z82 49)   · Denied: Family history of substance abuse  Sibling    · Denied: Family history of depression   · Denied: Family history of substance abuse  Family History    · Family history of arthritis (V17 7) (Z82 61)     The family history was reviewed and updated today        Social History    · Denied: History of Alcohol Use (History)   · Daily Coffee Consumption (___ Cups/Day)   · Lack of exercise (V69 0) (Z72 3)   ·    · Never a smoker   · Never a smoker   · Sleeps 6 -7 hours a day  The social history was reviewed and updated today  The social history was reviewed and is unchanged     The medication list was reviewed and updated today       Allergies   1  No Known Drug Allergies     Physical Exam   Left Foot: Appearance: Normal except as noted: excessive pronation-- and-- pes planus     Right Foot: Appearance: Normal except as noted: excessive pronation-- and-- pes planus  Tenderness: None except the calcaneous,-- medial calcaneous-- and-- insertion of the plantar fascia     Left Ankle: Appearance: Normal except ecchymosis-- and-- swelling laterally  ROM: limited ROM in all planes    Right Ankle: ROM: limited ROM in all planes Motor: diffuse weakness     Neurological Exam: performed  Light touch was intact bilaterally  Vibratory sensation was intact bilaterally  Response to monofilament test was intact bilaterally  Deep tendon reflexes: patellar reflex present bilaterally-- and-- achilles reflex present bilaterally     Vascular Exam: performed Dorsalis pedis pulses were 1/4 bilaterally  Posterior tibial pulses were 1/4 bilaterally  Elevation Pallor: diminished bilaterally  Capillary refill time was greater than 3 seconds bilaterally-- and-- Q9 findings bilateral  Negative digital hair noted  Positive abnormal cooling bilateral  Edema: moderate bilaterally-- and-- 6/7 pitting edema  Negative Homans sign     Toenails: All of the toenails were elongated,-- hypertrophied,-- discolored-- and-- Mycotic with onychogryphosis  Note is made of bilateral tinea pedis in moccasin foot  Distribution          Socks and shoes removed, Right Foot Findings: swollen, erythematous and dry       The sensory exam showed diminished vibratory sensation at the level of the toes   Diminished tactile sensation with monofilament testing throughout the right foot       Socks and shoes removed, Left Foot Findings: swollen, erythematous and dry       The sensory exam showed diminished vibratory sensation at the level of the toes  Diminished tactile sensation with monofilament testing throughout the left foot      Capillary refills findings on the right were delayed in the toes       Pulses:      1+ in the posterior tibialis on the right      1+ in the dorsalis pedis on the right       Capillary refills findings on the left were delayed in the toes       Pulses:      1+ in the posterior tibialis on the left      1+ in the dorsalis pedis on the left       Assign Risk Category: 2: Loss of protective sensation with or without weakness, deformity, callus, pre-ulcer, or history of ulceration  High risk     Hyperkeratosis: present on both first toes,-- present on both first sub metatarsals-- and-- Bilateral plantar moccasin tinea pedis noted     Shoe Gear Evaluation: performed ()  Recommendation(s): SAS style       Patient's shoes and socks removed  Assign Risk Category:  Deformity present; Loss of protective sensation; Weak pulses       Risk: 2

## 2019-10-26 DIAGNOSIS — E03.9 HYPOTHYROIDISM, UNSPECIFIED TYPE: ICD-10-CM

## 2019-10-28 RX ORDER — LEVOTHYROXINE SODIUM 112 UG/1
TABLET ORAL
Qty: 90 TABLET | Refills: 1 | Status: SHIPPED | OUTPATIENT
Start: 2019-10-28 | End: 2019-11-20 | Stop reason: SDUPTHER

## 2019-10-29 ENCOUNTER — TELEPHONE (OUTPATIENT)
Dept: NEPHROLOGY | Facility: CLINIC | Age: 79
End: 2019-10-29

## 2019-10-29 NOTE — TELEPHONE ENCOUNTER
I called and left message for patient to confirm rescheduled appt from 11/15 genoveva/ vitaly to 11/8

## 2019-11-04 ENCOUNTER — LAB (OUTPATIENT)
Dept: LAB | Facility: CLINIC | Age: 79
End: 2019-11-04
Payer: MEDICARE

## 2019-11-04 DIAGNOSIS — R73.09 ELEVATED GLUCOSE: ICD-10-CM

## 2019-11-04 DIAGNOSIS — M1A.9XX0 CHRONIC GOUT WITHOUT TOPHUS, UNSPECIFIED CAUSE, UNSPECIFIED SITE: ICD-10-CM

## 2019-11-04 DIAGNOSIS — N18.30 CKD (CHRONIC KIDNEY DISEASE), STAGE III (HCC): ICD-10-CM

## 2019-11-04 DIAGNOSIS — E78.5 HYPERLIPIDEMIA, UNSPECIFIED HYPERLIPIDEMIA TYPE: ICD-10-CM

## 2019-11-04 LAB
25(OH)D3 SERPL-MCNC: 50.7 NG/ML (ref 30–100)
ALBUMIN SERPL BCP-MCNC: 3.6 G/DL (ref 3.5–5)
ALP SERPL-CCNC: 132 U/L (ref 46–116)
ALT SERPL W P-5'-P-CCNC: 20 U/L (ref 12–78)
ANION GAP SERPL CALCULATED.3IONS-SCNC: 7 MMOL/L (ref 4–13)
AST SERPL W P-5'-P-CCNC: 14 U/L (ref 5–45)
BACTERIA UR QL AUTO: ABNORMAL /HPF
BASOPHILS # BLD AUTO: 0.04 THOUSANDS/ΜL (ref 0–0.1)
BASOPHILS NFR BLD AUTO: 1 % (ref 0–1)
BILIRUB SERPL-MCNC: 0.54 MG/DL (ref 0.2–1)
BILIRUB UR QL STRIP: NEGATIVE
BUN SERPL-MCNC: 29 MG/DL (ref 5–25)
CALCIUM SERPL-MCNC: 9.5 MG/DL (ref 8.3–10.1)
CHLORIDE SERPL-SCNC: 108 MMOL/L (ref 100–108)
CHOLEST SERPL-MCNC: 194 MG/DL (ref 50–200)
CLARITY UR: ABNORMAL
CO2 SERPL-SCNC: 29 MMOL/L (ref 21–32)
COLOR UR: YELLOW
CREAT SERPL-MCNC: 1.32 MG/DL (ref 0.6–1.3)
CREAT UR-MCNC: 98.4 MG/DL
EOSINOPHIL # BLD AUTO: 0.15 THOUSAND/ΜL (ref 0–0.61)
EOSINOPHIL NFR BLD AUTO: 3 % (ref 0–6)
ERYTHROCYTE [DISTWIDTH] IN BLOOD BY AUTOMATED COUNT: 16.3 % (ref 11.6–15.1)
GFR SERPL CREATININE-BSD FRML MDRD: 38 ML/MIN/1.73SQ M
GLUCOSE P FAST SERPL-MCNC: 102 MG/DL (ref 65–99)
GLUCOSE UR STRIP-MCNC: NEGATIVE MG/DL
HCT VFR BLD AUTO: 37.3 % (ref 34.8–46.1)
HDLC SERPL-MCNC: 46 MG/DL
HGB BLD-MCNC: 11.5 G/DL (ref 11.5–15.4)
HGB UR QL STRIP.AUTO: NEGATIVE
HYALINE CASTS #/AREA URNS LPF: ABNORMAL /LPF
IMM GRANULOCYTES # BLD AUTO: 0.03 THOUSAND/UL (ref 0–0.2)
IMM GRANULOCYTES NFR BLD AUTO: 1 % (ref 0–2)
KETONES UR STRIP-MCNC: NEGATIVE MG/DL
LDLC SERPL CALC-MCNC: 124 MG/DL (ref 0–100)
LEUKOCYTE ESTERASE UR QL STRIP: ABNORMAL
LYMPHOCYTES # BLD AUTO: 0.83 THOUSANDS/ΜL (ref 0.6–4.47)
LYMPHOCYTES NFR BLD AUTO: 14 % (ref 14–44)
MAGNESIUM SERPL-MCNC: 1.9 MG/DL (ref 1.6–2.6)
MCH RBC QN AUTO: 27.1 PG (ref 26.8–34.3)
MCHC RBC AUTO-ENTMCNC: 30.8 G/DL (ref 31.4–37.4)
MCV RBC AUTO: 88 FL (ref 82–98)
MONOCYTES # BLD AUTO: 0.56 THOUSAND/ΜL (ref 0.17–1.22)
MONOCYTES NFR BLD AUTO: 9 % (ref 4–12)
NEUTROPHILS # BLD AUTO: 4.46 THOUSANDS/ΜL (ref 1.85–7.62)
NEUTS SEG NFR BLD AUTO: 72 % (ref 43–75)
NITRITE UR QL STRIP: NEGATIVE
NON-SQ EPI CELLS URNS QL MICRO: ABNORMAL /HPF
NONHDLC SERPL-MCNC: 148 MG/DL
NRBC BLD AUTO-RTO: 0 /100 WBCS
PH UR STRIP.AUTO: 5.5 [PH]
PHOSPHATE SERPL-MCNC: 3.2 MG/DL (ref 2.3–4.1)
PLATELET # BLD AUTO: 160 THOUSANDS/UL (ref 149–390)
PMV BLD AUTO: 11.5 FL (ref 8.9–12.7)
POTASSIUM SERPL-SCNC: 3.7 MMOL/L (ref 3.5–5.3)
PROT SERPL-MCNC: 7.2 G/DL (ref 6.4–8.2)
PROT UR STRIP-MCNC: NEGATIVE MG/DL
PROT UR-MCNC: 15 MG/DL
PROT/CREAT UR: 0.15 MG/G{CREAT} (ref 0–0.1)
PTH-INTACT SERPL-MCNC: 89.9 PG/ML (ref 18.4–80.1)
RBC # BLD AUTO: 4.24 MILLION/UL (ref 3.81–5.12)
RBC #/AREA URNS AUTO: ABNORMAL /HPF
SODIUM SERPL-SCNC: 144 MMOL/L (ref 136–145)
SP GR UR STRIP.AUTO: 1.01 (ref 1–1.03)
TRIGL SERPL-MCNC: 122 MG/DL
URATE SERPL-MCNC: 6 MG/DL (ref 2–6.8)
UROBILINOGEN UR QL STRIP.AUTO: 0.2 E.U./DL
WBC # BLD AUTO: 6.07 THOUSAND/UL (ref 4.31–10.16)
WBC #/AREA URNS AUTO: ABNORMAL /HPF

## 2019-11-04 PROCEDURE — 83735 ASSAY OF MAGNESIUM: CPT

## 2019-11-04 PROCEDURE — 84156 ASSAY OF PROTEIN URINE: CPT

## 2019-11-04 PROCEDURE — 84550 ASSAY OF BLOOD/URIC ACID: CPT

## 2019-11-04 PROCEDURE — 81001 URINALYSIS AUTO W/SCOPE: CPT

## 2019-11-04 PROCEDURE — 82570 ASSAY OF URINE CREATININE: CPT

## 2019-11-04 PROCEDURE — 80053 COMPREHEN METABOLIC PANEL: CPT

## 2019-11-04 PROCEDURE — 36415 COLL VENOUS BLD VENIPUNCTURE: CPT

## 2019-11-04 PROCEDURE — 83970 ASSAY OF PARATHORMONE: CPT

## 2019-11-04 PROCEDURE — 84100 ASSAY OF PHOSPHORUS: CPT

## 2019-11-04 PROCEDURE — 85025 COMPLETE CBC W/AUTO DIFF WBC: CPT

## 2019-11-04 PROCEDURE — 80061 LIPID PANEL: CPT

## 2019-11-04 PROCEDURE — 82306 VITAMIN D 25 HYDROXY: CPT

## 2019-11-05 NOTE — RESULT ENCOUNTER NOTE
Will review with the patient at the appointment this week  Electrolytes stable  Creatinine stable  Vitamin-D stable  PTH improving  No changes for now

## 2019-11-08 ENCOUNTER — OFFICE VISIT (OUTPATIENT)
Dept: NEPHROLOGY | Facility: CLINIC | Age: 79
End: 2019-11-08
Payer: MEDICARE

## 2019-11-08 VITALS
WEIGHT: 213 LBS | DIASTOLIC BLOOD PRESSURE: 60 MMHG | BODY MASS INDEX: 40.22 KG/M2 | SYSTOLIC BLOOD PRESSURE: 148 MMHG | HEIGHT: 61 IN

## 2019-11-08 DIAGNOSIS — I10 BENIGN ESSENTIAL HTN: ICD-10-CM

## 2019-11-08 DIAGNOSIS — N18.30 CKD (CHRONIC KIDNEY DISEASE), STAGE III (HCC): Primary | ICD-10-CM

## 2019-11-08 PROCEDURE — 99214 OFFICE O/P EST MOD 30 MIN: CPT | Performed by: INTERNAL MEDICINE

## 2019-11-08 RX ORDER — ALLOPURINOL 300 MG/1
300 TABLET ORAL DAILY
COMMUNITY
End: 2020-01-28 | Stop reason: SDUPTHER

## 2019-11-08 RX ORDER — LOSARTAN POTASSIUM 25 MG/1
25 TABLET ORAL 2 TIMES DAILY
Qty: 180 TABLET | Refills: 3 | Status: SHIPPED | OUTPATIENT
Start: 2019-11-08 | End: 2020-03-11

## 2019-11-08 NOTE — LETTER
November 8, 2019     Keith Marques MD  [address] [facility] [address]  [place] [address]    Patient: Tigist Fermin   YOB: 1940   Date of Visit: 11/8/2019       Dear Dr Alem Pedraza:    Thank you for referring Rosemary Yadav to me for evaluation  Below are my notes for this consultation  If you have questions, please do not hesitate to call me  I look forward to following your patient along with you  Sincerely,        Efraín Orellana MD        CC: No Recipients  Efraín Orellana MD  11/8/2019 12:53 PM  Sign at close encounter  [address] [facility] [de-identified] y o  female MRN: [id number]  11/8/2019    Reason for Visit: CKD     ASSESSMENT and PLAN:    I had the pleasure of seeing Ms Jean Jacob today in the renal clinic for the continued management of CKD   79-year-old female with a past medical history of chronic kidney disease, venous stasis, HTN, hypothyroidism, lumbar canal stenosis, Anxiety, GERD, neuropathy, Gout, EF 23-39%, Grade 2 diastolic dysfunction who presents for follow up for CKD  To note, patient's  has now passed away in August      1) CKD - Prior year had nl Cr prior to aug 2016 and then had SHABANA during diarrhea episode  To note, patient also has a long standing history of HTN  Creatinine in 2013, 0 9 mg/dL  Cr early 2016 was 0 80-0 9 mg/dL; then increased starting in august 2016 to 2 3 mg/dL and has fluctuated since  CT scan in 2016, kidneys appearing normal at that time       Etiology of CKD may be long standing HTN and ATN   Baseline Cr ~ 1 1 -1 3 mg/dL, but recently in October 2018 the creatinine was rising to 1 7 mg/dL and this was thought to be a new baseline; but Cr was rising to 2 04   This is felt to be volume mediated and therefore diuretics were adjusted and lowered slightly with the holding of spironolactone and also there is question of perfusion issues and amlodipine was also held and patient's creatinine has returned to upper limit of baseline      Most recent creatinine is 1 3 mg/dL   Which may be new baseline  As of November 2019     - Urine eos 0%;   - A1c controlled prior  - UA 10-20 WBC  Monitoring  -U PCR stable  0 15 stable  -SPEP unrevealing  -UPEP unrevealing  - C3, C4 unrevealing  - Renal u/s with R 10 3 cm, L 10 4 cm, renal cortex 1 cm b/l; both kidneys slightly reduced in size; lower pole of R kidney is non obstructing calculus  - Pt follows with Urology team for nephrolithiasis  - No NSAIDs, the patient is not currently taking NSAID  -nuc stress test per Cardiology in Jan 2019 unrevealing  - repeat renal u/s 1/2019 - unrevealing with exception of renal cortical atrophy; but also 6 mm non shadowing calculus    - BP rising 11/8 office visit     Plan:  - BMP in 2 weeks  - increase losartan to 1 tab BID (25 mg BID)  - cont diuretics current dose  - full labs in 3 months  - appointment in 3 months      2) SOB - Volume - follows with Cardiology     -patient is following closely with [facility]  -diuretic increased as above     3) HTN -      - cont losartan, propranolol, bumex  - cont holding spironolactone and amlodipine     4) hypothyroid - as per Primary Care Physician     5) HPL - Primary Care following       6) Electrolytes - stable     7) MBD -     - Vit D 50 7 in nov 2019  - PTH improved 89 in nov 2019     8) gout -      - no longer on allopurinol  -patient only takes colchicine up to 3 doses max when has a gout flare     9) back pain - follows with Pain management     10) Colonoscopy in feb with internal hemorrhoids and polyp removed       11) alk phos elevation     - stable, but still elevated  - LFTs nl in may  - was started on allopurinol since last check when was normal  Could be related to allopurinol     12) Anemia - Hb stable     -hemoglobin stable   -low iron sat - started on iron supplement in may 2019     13) Mitral valve calcification     - per Cardiology team     14) elevated D-dimer-patient was given duplex of lower extremities which was unrevealing     15) knee pain after fall in august     - saw Orthopedic team  - steroid injection was given     It was a pleasure evaluating your patient in the office today  Thank you for allowing our team to participate in the care of Ms Neida Hernandez  Please do not hesitate to contact our team if further issues/questions shall arise in the interim       No problem-specific Assessment & Plan notes found for this encounter  HPI:    Pt denies new complaints  No SOB  Edema Stable    PATIENT INSTRUCTIONS:    Patient Instructions   1) Avoid NSAIDS - (Example - motrin, advil, ibuprofen, aleve, exederin, etc)  2) Always follow a low salt diet  3) please increase your losartan to 1 tablet twice daily  4) please check your labwork in 2 weeks  5) please check your blood pressure once daily and record it  And please call with BP readings in 1 week after you make the medicine change  6) full set of labs in 3 months  7) appointment in 4-5 months  8) if your blood pressure goes below 120 (top number) please call and hold your evening losartan        OBJECTIVE:  Current Weight: Weight - Scale: 96 6 kg (213 lb)  Vitals:    11/08/19 1107 11/08/19 1121   BP: 168/76 148/60   BP Location: Right arm    Patient Position: Sitting    Cuff Size: Standard    Weight: 96 6 kg (213 lb)    Height: 5' 1" (1 549 m)     Body mass index is 40 25 kg/m²  REVIEW OF SYSTEMS:    Review of Systems   Constitutional: Negative  Negative for fatigue  HENT: Negative  Eyes: Negative  Respiratory: Negative  Negative for shortness of breath  Cardiovascular: Negative  Negative for leg swelling  Gastrointestinal: Negative  Endocrine: Negative  Genitourinary: Negative  Negative for difficulty urinating  Musculoskeletal: Negative  Skin: Negative  Allergic/Immunologic: Negative  Neurological: Negative  Hematological: Negative  Psychiatric/Behavioral: Negative      All other systems reviewed and are negative  PHYSICAL EXAM:      Physical Exam   Constitutional: She is oriented to person, place, and time  She appears well-developed and well-nourished  No distress  HENT:   Head: Normocephalic and atraumatic  Mouth/Throat: No oropharyngeal exudate  Eyes: Conjunctivae are normal  Right eye exhibits no discharge  Left eye exhibits no discharge  No scleral icterus  Neck: Normal range of motion  Neck supple  No JVD present  Cardiovascular: Normal rate and regular rhythm  Exam reveals no gallop and no friction rub  No murmur heard  Pulmonary/Chest: Effort normal and breath sounds normal  No respiratory distress  She has no wheezes  She has no rales  Abdominal: Soft  Bowel sounds are normal  She exhibits no distension  There is no tenderness  There is no rebound  Musculoskeletal: Normal range of motion  She exhibits edema  She exhibits no tenderness or deformity  Neurological: She is alert and oriented to person, place, and time  Coordination normal    Skin: Skin is warm and dry  No rash noted  She is not diaphoretic  No erythema  No pallor  Psychiatric: She has a normal mood and affect  Her behavior is normal  Judgment and thought content normal    Nursing note and vitals reviewed        Medications:    Current Outpatient Medications:     allopurinol (ZYLOPRIM) 300 mg tablet, Take 300 mg by mouth daily, Disp: , Rfl:     aspirin 81 MG tablet, Take 81 mg by mouth every morning  , Disp: , Rfl:     bumetanide (BUMEX) 2 mg tablet, Take 2 mg daily in AM except on M-W-F take 2mg twice a day, Disp: 135 tablet, Rfl: 1    cholecalciferol (VITAMIN D3) 1,000 units tablet, Take 1 tablet by mouth daily, Disp: , Rfl:     gabapentin (NEURONTIN) 300 mg capsule, Take 1 capsule (300 mg total) by mouth daily at bedtime, Disp: 90 capsule, Rfl: 0    halobetasol (ULTRAVATE) 0 05 % cream, , Disp: , Rfl: 2    levothyroxine 112 mcg tablet, TAKE 1 TABLET EVERY DAY, Disp: 90 tablet, Rfl: 1    losartan (COZAAR) 25 mg tablet, Take 1 tablet (25 mg total) by mouth 2 (two) times a day, Disp: 180 tablet, Rfl: 3    Omega-3-acid Ethyl Esters & D3 1 & 1000 GM & UNIT KIT, Take 2 capsules by mouth 2 (two) times a day, Disp: , Rfl:     omeprazole (PriLOSEC) 20 mg delayed release capsule, Take 1 capsule (20 mg total) by mouth every morning, Disp: 90 capsule, Rfl: 1    propranolol (INDERAL) 80 mg tablet, TAKE 1/2 TABLET EVERY 12 HOURS DAILY, Disp: 90 tablet, Rfl: 3    ferrous sulfate 324 (65 Fe) mg, Take 1 tablet (324 mg total) by mouth daily before breakfast, Disp: 30 tablet, Rfl: 3    Laboratory Results:  Results from last 7 days   Lab Units 11/04/19  0844   WBC Thousand/uL 6 07   HEMOGLOBIN g/dL 11 5   HEMATOCRIT % 37 3   PLATELETS Thousands/uL 160   POTASSIUM mmol/L 3 7   CHLORIDE mmol/L 108   CO2 mmol/L 29   BUN mg/dL 29*   CREATININE mg/dL 1 32*   CALCIUM mg/dL 9 5   MAGNESIUM mg/dL 1 9   PHOSPHORUS mg/dL 3 2       Results for orders placed or performed in visit on 11/04/19   CBC and differential   Result Value Ref Range    WBC 6 07 4 31 - 10 16 Thousand/uL    RBC 4 24 3 81 - 5 12 Million/uL    Hemoglobin 11 5 11 5 - 15 4 g/dL    Hematocrit 37 3 34 8 - 46 1 %    MCV 88 82 - 98 fL    MCH 27 1 26 8 - 34 3 pg    MCHC 30 8 (L) 31 4 - 37 4 g/dL    RDW 16 3 (H) 11 6 - 15 1 %    MPV 11 5 8 9 - 12 7 fL    Platelets 801 812 - 646 Thousands/uL    nRBC 0 /100 WBCs    Neutrophils Relative 72 43 - 75 %    Immat GRANS % 1 0 - 2 %    Lymphocytes Relative 14 14 - 44 %    Monocytes Relative 9 4 - 12 %    Eosinophils Relative 3 0 - 6 %    Basophils Relative 1 0 - 1 %    Neutrophils Absolute 4 46 1 85 - 7 62 Thousands/µL    Immature Grans Absolute 0 03 0 00 - 0 20 Thousand/uL    Lymphocytes Absolute 0 83 0 60 - 4 47 Thousands/µL    Monocytes Absolute 0 56 0 17 - 1 22 Thousand/µL    Eosinophils Absolute 0 15 0 00 - 0 61 Thousand/µL    Basophils Absolute 0 04 0 00 - 0 10 Thousands/µL   Comprehensive metabolic panel Result Value Ref Range    Sodium 144 136 - 145 mmol/L    Potassium 3 7 3 5 - 5 3 mmol/L    Chloride 108 100 - 108 mmol/L    CO2 29 21 - 32 mmol/L    ANION GAP 7 4 - 13 mmol/L    BUN 29 (H) 5 - 25 mg/dL    Creatinine 1 32 (H) 0 60 - 1 30 mg/dL    Glucose, Fasting 102 (H) 65 - 99 mg/dL    Calcium 9 5 8 3 - 10 1 mg/dL    AST 14 5 - 45 U/L    ALT 20 12 - 78 U/L    Alkaline Phosphatase 132 (H) 46 - 116 U/L    Total Protein 7 2 6 4 - 8 2 g/dL    Albumin 3 6 3 5 - 5 0 g/dL    Total Bilirubin 0 54 0 20 - 1 00 mg/dL    eGFR 38 ml/min/1 73sq m   Lipid panel   Result Value Ref Range    Cholesterol 194 50 - 200 mg/dL    Triglycerides 122 <=150 mg/dL    HDL, Direct 46 >=40 mg/dL    LDL Calculated 124 (H) 0 - 100 mg/dL    Non-HDL-Chol (CHOL-HDL) 148 mg/dl   Uric acid   Result Value Ref Range    Uric Acid 6 0 2 0 - 6 8 mg/dL   Phosphorus   Result Value Ref Range    Phosphorus 3 2 2 3 - 4 1 mg/dL   Protein / creatinine ratio, urine   Result Value Ref Range    Creatinine, Ur 98 4 mg/dL    Protein Urine Random 15 mg/dL    Prot/Creat Ratio, Ur 0 15 (H) 0 00 - 0 10   PTH, intact   Result Value Ref Range    PTH 89 9 (H) 18 4 - 80 1 pg/mL   Urinalysis with microscopic   Result Value Ref Range    Clarity, UA Cloudy     Color, UA Yellow     Specific [place], UA 1 015 1 003 - 1 030    pH, UA 5 5 4 5, 5 0, 5 5, 6 0, 6 5, 7 0, 7 5, 8 0    Glucose, UA Negative Negative mg/dl    Ketones, UA Negative Negative mg/dl    Blood, UA Negative Negative    Protein, UA Negative Negative mg/dl    Nitrite, UA Negative Negative    Bilirubin, UA Negative Negative    Urobilinogen, UA 0 2 0 2, 1 0 E U /dl E U /dl    Leukocytes, UA Small (A) Negative    WBC, UA 10-20 (A) None Seen, 0-5, 5-55, 5-65 /hpf    RBC, UA None Seen None Seen, 0-5 /hpf    Hyaline Casts, UA None Seen None Seen /lpf    Bacteria, UA Occasional None Seen, Occasional /hpf    Epithelial Cells Moderate (A) None Seen, Occasional /hpf   Vitamin D 25 hydroxy   Result Value Ref Range    Vit D, 25-Hydroxy 50 7 30 0 - 100 0 ng/mL   Magnesium   Result Value Ref Range    Magnesium 1 9 1 6 - 2 6 mg/dL

## 2019-11-08 NOTE — LETTER
November 8, 2019     Shelby Min MD  [id number] [facility],[address]  [facility] [address]    Patient: Fabricio Mc   YOB: 1940   Date of Visit: 11/8/2019       Dear Dr Lion Meléndez:    Thank you for referring Fantasma Coley to me for evaluation  Below are my notes for this consultation  If you have questions, please do not hesitate to call me  I look forward to following your patient along with you  Sincerely,        Roscoe Caraballo MD        CC: No Recipients  Roscoe Caraballo MD  11/8/2019 11:32 AM  Incomplete  NEPHROLOGY OFFICE VISIT   Fabricio Mc [de-identified] y o  female MRN: [id number]  11/8/2019    Reason for Visit: CKD     ASSESSMENT and PLAN:    I had the pleasure of seeing Ms Harvey Krabbe today in the renal clinic for the continued management of CKD   58-year-old female with a past medical history of chronic kidney disease, venous stasis, HTN, hypothyroidism, lumbar canal stenosis, Anxiety, GERD, neuropathy, Gout, EF 74-80%, Grade 2 diastolic dysfunction who presents for follow up for CKD  To note, patient's  has now passed away in August      1) CKD - Prior year had nl Cr prior to aug 2016 and then had SHABANA during diarrhea episode  To note, patient also has a long standing history of HTN  Creatinine in 2013, 0 9 mg/dL  Cr early 2016 was 0 80-0 9 mg/dL; then increased starting in august 2016 to 2 3 mg/dL and has fluctuated since  CT scan in 2016, kidneys appearing normal at that time       Etiology of CKD may be long standing HTN and ATN   Baseline Cr ~ 1 1 -1 3 mg/dL, but recently in October 2018 the creatinine was rising to 1 7 mg/dL and this was thought to be a new baseline; but Cr was rising to 2 04   This is felt to be volume mediated and therefore diuretics were adjusted and lowered slightly with the holding of spironolactone and also there is question of perfusion issues and amlodipine was also held and patient's creatinine has returned to upper limit of baseline      Most recent creatinine is 1 3 mg/dL   Which may be new baseline  As of November 2019     - Urine eos 0%;   - A1c controlled prior  - UA 10-20 WBC  Monitoring  -U PCR stable  0 15 stable  -SPEP unrevealing  -UPEP unrevealing  - C3, C4 unrevealing  - Renal u/s with R 10 3 cm, L 10 4 cm, renal cortex 1 cm b/l; both kidneys slightly reduced in size; lower pole of R kidney is non obstructing calculus  - Pt follows with Urology team for nephrolithiasis  - No NSAIDs, the patient is not currently taking NSAID  -nuc stress test per Cardiology in Jan 2019 unrevealing  - repeat renal u/s 1/2019 - unrevealing with exception of renal cortical atrophy; but also 6 mm non shadowing calculus    - BP rising 11/8 office visit    Plan:  - BMP in 2 weeks  - increase losartan to 1 tab BID (25 mg BID)  - cont diuretics current dose  - full labs in 3 months  - appointment in 3 months      2) SOB - Volume - follows with Cardiology     -patient is following closely with Pulmonary and Cardiology  -diuretic increased as above     3) HTN -      - cont losartan, propranolol, bumex  - cont holding spironolactone and amlodipine     4) hypothyroid - as per Primary Care Physician     5) HPL - Primary Care following       6) Electrolytes - stable     7) MBD -     - Vit D 50 7 in nov 2019  - PTH improved 89 in nov 2019     8) gout -      - no longer on allopurinol  -patient only takes colchicine up to 3 doses max when has a gout flare     9) back pain - follows with Pain management     10) Colonoscopy in feb with internal hemorrhoids and polyp removed       11) alk phos elevation     - stable, but still elevated  - LFTs nl in may  - was started on allopurinol since last check when was normal  Could be related to allopurinol     12) Anemia - Hb stable     -hemoglobin stable   -low iron sat - started on iron supplement in may 2019     13) Mitral valve calcification     - per Cardiology team     14) elevated D-dimer-patient was given duplex of lower extremities which was unrevealing     15) knee pain after fall in august    - saw Orthopedic team  - steroid injection was given     It was a pleasure evaluating your patient in the office today  Thank you for allowing our team to participate in the care of Ms Dior Block  Please do not hesitate to contact our team if further issues/questions shall arise in the interim         No problem-specific Assessment & Plan notes found for this encounter  HPI:    ***    PATIENT INSTRUCTIONS:    There are no Patient Instructions on file for this visit  OBJECTIVE:  Current Weight:    There were no vitals filed for this visit  There is no height or weight on file to calculate BMI        REVIEW OF SYSTEMS:    Review of Systems    PHYSICAL EXAM:      Physical Exam    Medications:    Current Outpatient Medications:     aspirin 81 MG tablet, Take 81 mg by mouth every morning  , Disp: , Rfl:     bumetanide (BUMEX) 2 mg tablet, Take 2 mg daily in AM except on M-W-F take 2mg twice a day, Disp: 135 tablet, Rfl: 1    cholecalciferol (VITAMIN D3) 1,000 units tablet, Take 1 tablet by mouth daily, Disp: , Rfl:     ferrous sulfate 324 (65 Fe) mg, Take 1 tablet (324 mg total) by mouth daily before breakfast, Disp: 30 tablet, Rfl: 3    gabapentin (NEURONTIN) 300 mg capsule, Take 1 capsule (300 mg total) by mouth daily at bedtime, Disp: 90 capsule, Rfl: 0    halobetasol (ULTRAVATE) 0 05 % cream, , Disp: , Rfl: 2    levothyroxine 112 mcg tablet, TAKE 1 TABLET EVERY DAY, Disp: 90 tablet, Rfl: 1    losartan (COZAAR) 25 mg tablet, Take 1 tablet (25 mg total) by mouth daily, Disp: 90 tablet, Rfl: 3    Omega-3-acid Ethyl Esters & D3 1 & 1000 GM & UNIT KIT, Take 2 capsules by mouth 2 (two) times a day, Disp: , Rfl:     omeprazole (PriLOSEC) 20 mg delayed release capsule, Take 1 capsule (20 mg total) by mouth every morning, Disp: 90 capsule, Rfl: 1    propranolol (INDERAL) 80 mg tablet, TAKE 1/2 TABLET EVERY 12 HOURS DAILY, Disp: 90 tablet, Rfl: 3    Laboratory Results:  Results from last 7 days   Lab Units 11/04/19  0844   WBC Thousand/uL 6 07   HEMOGLOBIN g/dL 11 5   HEMATOCRIT % 37 3   PLATELETS Thousands/uL 160   POTASSIUM mmol/L 3 7   CHLORIDE mmol/L 108   CO2 mmol/L 29   BUN mg/dL 29*   CREATININE mg/dL 1 32*   CALCIUM mg/dL 9 5   MAGNESIUM mg/dL 1 9   PHOSPHORUS mg/dL 3 2       Results for orders placed or performed in visit on 11/04/19   CBC and differential   Result Value Ref Range    WBC 6 07 4 31 - 10 16 Thousand/uL    RBC 4 24 3 81 - 5 12 Million/uL    Hemoglobin 11 5 11 5 - 15 4 g/dL    Hematocrit 37 3 34 8 - 46 1 %    MCV 88 82 - 98 fL    MCH 27 1 26 8 - 34 3 pg    MCHC 30 8 (L) 31 4 - 37 4 g/dL    RDW 16 3 (H) 11 6 - 15 1 %    MPV 11 5 8 9 - 12 7 fL    Platelets 593 450 - 041 Thousands/uL    nRBC 0 /100 WBCs    Neutrophils Relative 72 43 - 75 %    Immat GRANS % 1 0 - 2 %    Lymphocytes Relative 14 14 - 44 %    Monocytes Relative 9 4 - 12 %    Eosinophils Relative 3 0 - 6 %    Basophils Relative 1 0 - 1 %    Neutrophils Absolute 4 46 1 85 - 7 62 Thousands/µL    Immature Grans Absolute 0 03 0 00 - 0 20 Thousand/uL    Lymphocytes Absolute 0 83 0 60 - 4 47 Thousands/µL    Monocytes Absolute 0 56 0 17 - 1 22 Thousand/µL    Eosinophils Absolute 0 15 0 00 - 0 61 Thousand/µL    Basophils Absolute 0 04 0 00 - 0 10 Thousands/µL   Comprehensive metabolic panel   Result Value Ref Range    Sodium 144 136 - 145 mmol/L    Potassium 3 7 3 5 - 5 3 mmol/L    Chloride 108 100 - 108 mmol/L    CO2 29 21 - 32 mmol/L    ANION GAP 7 4 - 13 mmol/L    BUN 29 (H) 5 - 25 mg/dL    Creatinine 1 32 (H) 0 60 - 1 30 mg/dL    Glucose, Fasting 102 (H) 65 - 99 mg/dL    Calcium 9 5 8 3 - 10 1 mg/dL    AST 14 5 - 45 U/L    ALT 20 12 - 78 U/L    Alkaline Phosphatase 132 (H) 46 - 116 U/L    Total Protein 7 2 6 4 - 8 2 g/dL    Albumin 3 6 3 5 - 5 0 g/dL    Total Bilirubin 0 54 0 20 - 1 00 mg/dL    eGFR 38 ml/min/1 73sq m   Lipid panel   Result Value Ref Range Cholesterol 194 50 - 200 mg/dL    Triglycerides 122 <=150 mg/dL    HDL, Direct 46 >=40 mg/dL    LDL Calculated 124 (H) 0 - 100 mg/dL    Non-HDL-Chol (CHOL-HDL) 148 mg/dl   Uric acid   Result Value Ref Range    Uric Acid 6 0 2 0 - 6 8 mg/dL   Phosphorus   Result Value Ref Range    Phosphorus 3 2 2 3 - 4 1 mg/dL   Protein / creatinine ratio, urine   Result Value Ref Range    Creatinine, Ur 98 4 mg/dL    Protein Urine Random 15 mg/dL    Prot/Creat Ratio, Ur 0 15 (H) 0 00 - 0 10   PTH, intact   Result Value Ref Range    PTH 89 9 (H) 18 4 - 80 1 pg/mL   Urinalysis with microscopic   Result Value Ref Range    Clarity, UA Cloudy     Color, UA Yellow     Specific [place], UA 1 015 1 003 - 1 030    pH, UA 5 5 4 5, 5 0, 5 5, 6 0, 6 5, 7 0, 7 5, 8 0    Glucose, UA Negative Negative mg/dl    Ketones, UA Negative Negative mg/dl    Blood, UA Negative Negative    Protein, UA Negative Negative mg/dl    Nitrite, UA Negative Negative    Bilirubin, UA Negative Negative    Urobilinogen, UA 0 2 0 2, 1 0 E U /dl E U /dl    Leukocytes, UA Small (A) Negative    WBC, UA 10-20 (A) None Seen, 0-5, 5-55, 5-65 /hpf    RBC, UA None Seen None Seen, 0-5 /hpf    Hyaline Casts, UA None Seen None Seen /lpf    Bacteria, UA Occasional None Seen, Occasional /hpf    Epithelial Cells Moderate (A) None Seen, Occasional /hpf   Vitamin D 25 hydroxy   Result Value Ref Range    Vit D, 25-Hydroxy 50 7 30 0 - 100 0 ng/mL   Magnesium   Result Value Ref Range    Magnesium 1 9 1 6 - 2 6 mg/dL           Yvette Diaz MD  11/8/2019 12:53 PM  Sign at close encounter  [id number] [facility] [de-identified] y o  female MRN: [id number]  11/8/2019    Reason for Visit: CKD     ASSESSMENT and PLAN:    I had the pleasure of seeing Ms Ming Grigsby today in the renal clinic for the continued management of CKD        53-year-old female with a past medical history of chronic kidney disease, venous stasis, HTN, hypothyroidism, lumbar canal stenosis, Anxiety, GERD, neuropathy, Gout, EF 84-57%, Grade 2 diastolic dysfunction who presents for follow up for CKD  To note, patient's  has now passed away in August      1) CKD - Prior year had nl Cr prior to aug 2016 and then had SHABANA during diarrhea episode  To note, patient also has a long standing history of HTN  Creatinine in 2013, 0 9 mg/dL  Cr early 2016 was 0 80-0 9 mg/dL; then increased starting in august 2016 to 2 3 mg/dL and has fluctuated since  CT scan in 2016, kidneys appearing normal at that time       Etiology of CKD may be long standing HTN and ATN  Baseline Cr ~ 1 1 -1 3 mg/dL, but recently in October 2018 the creatinine was rising to 1 7 mg/dL and this was thought to be a new baseline; but Cr was rising to 2 04   This is felt to be volume mediated and therefore diuretics were adjusted and lowered slightly with the holding of spironolactone and also there is question of perfusion issues and amlodipine was also held and patient's creatinine has returned to upper limit of baseline      Most recent creatinine is 1 3 mg/dL  Mihaela Zuñiga may be new baseline  As of November 2019     - Urine eos 0%;   - A1c controlled prior  - UA 10-20 WBC  Monitoring  -U PCR stable  0 15 stable  -SPEP unrevealing  -UPEP unrevealing  - C3, C4 unrevealing  - Renal u/s with R 10 3 cm, L 10 4 cm, renal cortex 1 cm b/l; both kidneys slightly reduced in size; lower pole of R kidney is non obstructing calculus  - Pt follows with Urology team for nephrolithiasis  - No NSAIDs, the patient is not currently taking NSAID  -nuc stress test per Cardiology in Jan 2019 unrevealing  - repeat renal u/s 1/2019 - unrevealing with exception of renal cortical atrophy; but also 6 mm non shadowing calculus    - BP rising 11/8 office visit     Plan:  - BMP in 2 weeks  - increase losartan to 1 tab BID (25 mg BID)  - cont diuretics current dose  - full labs in 3 months  - appointment in 3 months      2) SOB - Volume - follows with Cardiology     -patient is following closely with Pulmonary and Cardiology  -diuretic increased as above     3) HTN -      - cont losartan, propranolol, bumex  - cont holding spironolactone and amlodipine     4) hypothyroid - as per Primary Care Physician     5) HPL - Primary Care following       6) Electrolytes - stable     7) MBD -     - Vit D 50 7 in nov 2019  - PTH improved 89 in nov 2019     8) gout -      - no longer on allopurinol  -patient only takes colchicine up to 3 doses max when has a gout flare     9) back pain - follows with Pain management     10) Colonoscopy in feb with internal hemorrhoids and polyp removed       11) alk phos elevation     - stable, but still elevated  - LFTs nl in may  - was started on allopurinol since last check when was normal  Could be related to allopurinol     12) Anemia - Hb stable     -hemoglobin stable   -low iron sat - started on iron supplement in may 2019     13) Mitral valve calcification     - per Cardiology team     14) elevated D-dimer-patient was given duplex of lower extremities which was unrevealing     15) knee pain after fall in august     - saw Orthopedic team  - steroid injection was given     It was a pleasure evaluating your patient in the office today  Thank you for allowing our team to participate in the care of Ms Fabricio Mc  Please do not hesitate to contact our team if further issues/questions shall arise in the interim       No problem-specific Assessment & Plan notes found for this encounter  HPI:    Pt denies new complaints  No SOB  Edema Stable    PATIENT INSTRUCTIONS:    Patient Instructions   1) Avoid NSAIDS - (Example - motrin, advil, ibuprofen, aleve, exederin, etc)  2) Always follow a low salt diet  3) please increase your losartan to 1 tablet twice daily  4) please check your labwork in 2 weeks  5) please check your blood pressure once daily and record it   And please call with BP readings in 1 week after you make the medicine change  6) full set of labs in 3 months  7) appointment in 4-5 months  8) if your blood pressure goes below 120 (top number) please call and hold your evening losartan        OBJECTIVE:  Current Weight: Weight - Scale: 96 6 kg (213 lb)  Vitals:    11/08/19 1107 11/08/19 1121   BP: 168/76 148/60   BP Location: Right arm    Patient Position: Sitting    Cuff Size: Standard    Weight: 96 6 kg (213 lb)    Height: 5' 1" (1 549 m)     Body mass index is 40 25 kg/m²  REVIEW OF SYSTEMS:    Review of Systems   Constitutional: Negative  Negative for fatigue  HENT: Negative  Eyes: Negative  Respiratory: Negative  Negative for shortness of breath  Cardiovascular: Negative  Negative for leg swelling  Gastrointestinal: Negative  Endocrine: Negative  Genitourinary: Negative  Negative for difficulty urinating  Musculoskeletal: Negative  Skin: Negative  Allergic/Immunologic: Negative  Neurological: Negative  Hematological: Negative  Psychiatric/Behavioral: Negative  All other systems reviewed and are negative  PHYSICAL EXAM:      Physical Exam   Constitutional: She is oriented to person, place, and time  She appears well-developed and well-nourished  No distress  HENT:   Head: Normocephalic and atraumatic  Mouth/Throat: No oropharyngeal exudate  Eyes: Conjunctivae are normal  Right eye exhibits no discharge  Left eye exhibits no discharge  No scleral icterus  Neck: Normal range of motion  Neck supple  No JVD present  Cardiovascular: Normal rate and regular rhythm  Exam reveals no gallop and no friction rub  No murmur heard  Pulmonary/Chest: Effort normal and breath sounds normal  No respiratory distress  She has no wheezes  She has no rales  Abdominal: Soft  Bowel sounds are normal  She exhibits no distension  There is no tenderness  There is no rebound  Musculoskeletal: Normal range of motion  She exhibits edema  She exhibits no tenderness or deformity     Neurological: She is alert and oriented to person, place, and time  Coordination normal    Skin: Skin is warm and dry  No rash noted  She is not diaphoretic  No erythema  No pallor  Psychiatric: She has a normal mood and affect  Her behavior is normal  Judgment and thought content normal    Nursing note and vitals reviewed        Medications:    Current Outpatient Medications:     allopurinol (ZYLOPRIM) 300 mg tablet, Take 300 mg by mouth daily, Disp: , Rfl:     aspirin 81 MG tablet, Take 81 mg by mouth every morning  , Disp: , Rfl:     bumetanide (BUMEX) 2 mg tablet, Take 2 mg daily in AM except on M-W-F take 2mg twice a day, Disp: 135 tablet, Rfl: 1    cholecalciferol (VITAMIN D3) 1,000 units tablet, Take 1 tablet by mouth daily, Disp: , Rfl:     gabapentin (NEURONTIN) 300 mg capsule, Take 1 capsule (300 mg total) by mouth daily at bedtime, Disp: 90 capsule, Rfl: 0    halobetasol (ULTRAVATE) 0 05 % cream, , Disp: , Rfl: 2    levothyroxine 112 mcg tablet, TAKE 1 TABLET EVERY DAY, Disp: 90 tablet, Rfl: 1    losartan (COZAAR) 25 mg tablet, Take 1 tablet (25 mg total) by mouth 2 (two) times a day, Disp: 180 tablet, Rfl: 3    Omega-3-acid Ethyl Esters & D3 1 & 1000 GM & UNIT KIT, Take 2 capsules by mouth 2 (two) times a day, Disp: , Rfl:     omeprazole (PriLOSEC) 20 mg delayed release capsule, Take 1 capsule (20 mg total) by mouth every morning, Disp: 90 capsule, Rfl: 1    propranolol (INDERAL) 80 mg tablet, TAKE 1/2 TABLET EVERY 12 HOURS DAILY, Disp: 90 tablet, Rfl: 3    ferrous sulfate 324 (65 Fe) mg, Take 1 tablet (324 mg total) by mouth daily before breakfast, Disp: 30 tablet, Rfl: 3    Laboratory Results:  Results from last 7 days   Lab Units 11/04/19  0844   WBC Thousand/uL 6 07   HEMOGLOBIN g/dL 11 5   HEMATOCRIT % 37 3   PLATELETS Thousands/uL 160   POTASSIUM mmol/L 3 7   CHLORIDE mmol/L 108   CO2 mmol/L 29   BUN mg/dL 29*   CREATININE mg/dL 1 32*   CALCIUM mg/dL 9 5   MAGNESIUM mg/dL 1 9   PHOSPHORUS mg/dL 3 2       Results for orders placed or performed in visit on 11/04/19   CBC and differential   Result Value Ref Range    WBC 6 07 4 31 - 10 16 Thousand/uL    RBC 4 24 3 81 - 5 12 Million/uL    Hemoglobin 11 5 11 5 - 15 4 g/dL    Hematocrit 37 3 34 8 - 46 1 %    MCV 88 82 - 98 fL    MCH 27 1 26 8 - 34 3 pg    MCHC 30 8 (L) 31 4 - 37 4 g/dL    RDW 16 3 (H) 11 6 - 15 1 %    MPV 11 5 8 9 - 12 7 fL    Platelets 821 761 - 046 Thousands/uL    nRBC 0 /100 WBCs    Neutrophils Relative 72 43 - 75 %    Immat GRANS % 1 0 - 2 %    Lymphocytes Relative 14 14 - 44 %    Monocytes Relative 9 4 - 12 %    Eosinophils Relative 3 0 - 6 %    Basophils Relative 1 0 - 1 %    Neutrophils Absolute 4 46 1 85 - 7 62 Thousands/µL    Immature Grans Absolute 0 03 0 00 - 0 20 Thousand/uL    Lymphocytes Absolute 0 83 0 60 - 4 47 Thousands/µL    Monocytes Absolute 0 56 0 17 - 1 22 Thousand/µL    Eosinophils Absolute 0 15 0 00 - 0 61 Thousand/µL    Basophils Absolute 0 04 0 00 - 0 10 Thousands/µL   Comprehensive metabolic panel   Result Value Ref Range    Sodium 144 136 - 145 mmol/L    Potassium 3 7 3 5 - 5 3 mmol/L    Chloride 108 100 - 108 mmol/L    CO2 29 21 - 32 mmol/L    ANION GAP 7 4 - 13 mmol/L    BUN 29 (H) 5 - 25 mg/dL    Creatinine 1 32 (H) 0 60 - 1 30 mg/dL    Glucose, Fasting 102 (H) 65 - 99 mg/dL    Calcium 9 5 8 3 - 10 1 mg/dL    AST 14 5 - 45 U/L    ALT 20 12 - 78 U/L    Alkaline Phosphatase 132 (H) 46 - 116 U/L    Total Protein 7 2 6 4 - 8 2 g/dL    Albumin 3 6 3 5 - 5 0 g/dL    Total Bilirubin 0 54 0 20 - 1 00 mg/dL    eGFR 38 ml/min/1 73sq m   Lipid panel   Result Value Ref Range    Cholesterol 194 50 - 200 mg/dL    Triglycerides 122 <=150 mg/dL    HDL, Direct 46 >=40 mg/dL    LDL Calculated 124 (H) 0 - 100 mg/dL    Non-HDL-Chol (CHOL-HDL) 148 mg/dl   Uric acid   Result Value Ref Range    Uric Acid 6 0 2 0 - 6 8 mg/dL   Phosphorus   Result Value Ref Range    Phosphorus 3 2 2 3 - 4 1 mg/dL   Protein / creatinine ratio, urine   Result Value Ref Range Creatinine, Ur 98 4 mg/dL    Protein Urine Random 15 mg/dL    Prot/Creat Ratio, Ur 0 15 (H) 0 00 - 0 10   PTH, intact   Result Value Ref Range    PTH 89 9 (H) 18 4 - 80 1 pg/mL   Urinalysis with microscopic   Result Value Ref Range    Clarity, UA Cloudy     Color, UA Yellow     Specific [place], UA 1 015 1 003 - 1 030    pH, UA 5 5 4 5, 5 0, 5 5, 6 0, 6 5, 7 0, 7 5, 8 0    Glucose, UA Negative Negative mg/dl    Ketones, UA Negative Negative mg/dl    Blood, UA Negative Negative    Protein, UA Negative Negative mg/dl    Nitrite, UA Negative Negative    Bilirubin, UA Negative Negative    Urobilinogen, UA 0 2 0 2, 1 0 E U /dl E U /dl    Leukocytes, UA Small (A) Negative    WBC, UA 10-20 (A) None Seen, 0-5, 5-55, 5-65 /hpf    RBC, UA None Seen None Seen, 0-5 /hpf    Hyaline Casts, UA None Seen None Seen /lpf    Bacteria, UA Occasional None Seen, Occasional /hpf    Epithelial Cells Moderate (A) None Seen, Occasional /hpf   Vitamin D 25 hydroxy   Result Value Ref Range    Vit D, 25-Hydroxy 50 7 30 0 - 100 0 ng/mL   Magnesium   Result Value Ref Range    Magnesium 1 9 1 6 - 2 6 mg/dL

## 2019-11-08 NOTE — PATIENT INSTRUCTIONS
1) Avoid NSAIDS - (Example - motrin, advil, ibuprofen, aleve, exederin, etc)  2) Always follow a low salt diet  3) please increase your losartan to 1 tablet twice daily  4) please check your labwork in 2 weeks  5) please check your blood pressure once daily and record it   And please call with BP readings in 1 week after you make the medicine change  6) full set of labs in 3 months  7) appointment in 4-5 months  8) if your blood pressure goes below 120 (top number) please call and hold your evening losartan

## 2019-11-08 NOTE — PROGRESS NOTES
NEPHROLOGY OFFICE VISIT   Isai Verde [de-identified] y o  female MRN: [id number]  11/8/2019    Reason for Visit: CKD     ASSESSMENT and PLAN:    I had the pleasure of seeing Ms Sarita Simms today in the renal clinic for the continued management of CKD   77-year-old female with a past medical history of chronic kidney disease, venous stasis, HTN, hypothyroidism, lumbar canal stenosis, Anxiety, GERD, neuropathy, Gout, EF 13-65%, Grade 2 diastolic dysfunction who presents for follow up for CKD  To note, patient's  has now passed away in August      1) CKD - Prior year had nl Cr prior to aug 2016 and then had SHABANA during diarrhea episode  To note, patient also has a long standing history of HTN  Creatinine in 2013, 0 9 mg/dL  Cr early 2016 was 0 80-0 9 mg/dL; then increased starting in august 2016 to 2 3 mg/dL and has fluctuated since  CT scan in 2016, kidneys appearing normal at that time       Etiology of CKD may be long standing HTN and ATN  Baseline Cr ~ 1 1 -1 3 mg/dL, but recently in October 2018 the creatinine was rising to 1 7 mg/dL and this was thought to be a new baseline; but Cr was rising to 2 04   This is felt to be volume mediated and therefore diuretics were adjusted and lowered slightly with the holding of spironolactone and also there is question of perfusion issues and amlodipine was also held and patient's creatinine has returned to upper limit of baseline      Most recent creatinine is 1 3 mg/dL  Thompson Peacock may be new baseline  As of November 2019     - Urine eos 0%;   - A1c controlled prior  - UA 10-20 WBC  Monitoring  -U PCR stable  0 15 stable  -SPEP unrevealing  -UPEP unrevealing  - C3, C4 unrevealing  - Renal u/s with R 10 3 cm, L 10 4 cm, renal cortex 1 cm b/l; both kidneys slightly reduced in size; lower pole of R kidney is non obstructing calculus  - Pt follows with Urology team for nephrolithiasis     - No NSAIDs, the patient is not currently taking NSAID  -nuc stress test per Cardiology in Jan 2019 unrevealing  - repeat renal u/s 1/2019 - unrevealing with exception of renal cortical atrophy; but also 6 mm non shadowing calculus  - BP rising 11/8 office visit     Plan:  - BMP in 2 weeks  - increase losartan to 1 tab BID (25 mg BID)  - cont diuretics current dose  - full labs in 3 months  - appointment in 3 months      2) SOB - Volume - follows with Cardiology     -patient is following closely with [facility]  -diuretic increased as above     3) HTN -      - cont losartan, propranolol, bumex  - cont holding spironolactone and amlodipine     4) hypothyroid - as per Primary Care Physician     5) HPL - Primary Care following       6) Electrolytes - stable     7) MBD -     - Vit D 50 7 in nov 2019  - PTH improved 89 in nov 2019     8) gout -      - no longer on allopurinol  -patient only takes colchicine up to 3 doses max when has a gout flare     9) back pain - follows with Pain management     10) Colonoscopy in feb with internal hemorrhoids and polyp removed       11) alk phos elevation     - stable, but still elevated  - LFTs nl in may  - was started on allopurinol since last check when was normal  Could be related to allopurinol     12) Anemia - Hb stable     -hemoglobin stable   -low iron sat - started on iron supplement in may 2019     13) Mitral valve calcification     - per Cardiology team     14) elevated D-dimer-patient was given duplex of lower extremities which was unrevealing     15) knee pain after fall in august     - saw Orthopedic team  - steroid injection was given     It was a pleasure evaluating your patient in the office today  Thank you for allowing our team to participate in the care of Ms Neida Hernandez  Please do not hesitate to contact our team if further issues/questions shall arise in the interim       No problem-specific Assessment & Plan notes found for this encounter  HPI:    Pt denies new complaints  No SOB   Edema Stable    PATIENT INSTRUCTIONS:    Patient Instructions   1) Avoid NSAIDS - (Example - motrin, advil, ibuprofen, aleve, exederin, etc)  2) Always follow a low salt diet  3) please increase your losartan to 1 tablet twice daily  4) please check your labwork in 2 weeks  5) please check your blood pressure once daily and record it  And please call with BP readings in 1 week after you make the medicine change  6) full set of labs in 3 months  7) appointment in 4-5 months  8) if your blood pressure goes below 120 (top number) please call and hold your evening losartan        OBJECTIVE:  Current Weight: Weight - Scale: 96 6 kg (213 lb)  Vitals:    11/08/19 1107 11/08/19 1121   BP: 168/76 148/60   BP Location: Right arm    Patient Position: Sitting    Cuff Size: Standard    Weight: 96 6 kg (213 lb)    Height: 5' 1" (1 549 m)     Body mass index is 40 25 kg/m²  REVIEW OF SYSTEMS:    Review of Systems   Constitutional: Negative  Negative for fatigue  HENT: Negative  Eyes: Negative  Respiratory: Negative  Negative for shortness of breath  Cardiovascular: Negative  Negative for leg swelling  Gastrointestinal: Negative  Endocrine: Negative  Genitourinary: Negative  Negative for difficulty urinating  Musculoskeletal: Negative  Skin: Negative  Allergic/Immunologic: Negative  Neurological: Negative  Hematological: Negative  Psychiatric/Behavioral: Negative  All other systems reviewed and are negative  PHYSICAL EXAM:      Physical Exam   Constitutional: She is oriented to person, place, and time  She appears well-developed and well-nourished  No distress  HENT:   Head: Normocephalic and atraumatic  Mouth/Throat: No oropharyngeal exudate  Eyes: Conjunctivae are normal  Right eye exhibits no discharge  Left eye exhibits no discharge  No scleral icterus  Neck: Normal range of motion  Neck supple  No JVD present  Cardiovascular: Normal rate and regular rhythm  Exam reveals no gallop and no friction rub     No murmur heard   Pulmonary/Chest: Effort normal and breath sounds normal  No respiratory distress  She has no wheezes  She has no rales  Abdominal: Soft  Bowel sounds are normal  She exhibits no distension  There is no tenderness  There is no rebound  Musculoskeletal: Normal range of motion  She exhibits edema  She exhibits no tenderness or deformity  Neurological: She is alert and oriented to person, place, and time  Coordination normal    Skin: Skin is warm and dry  No rash noted  She is not diaphoretic  No erythema  No pallor  Psychiatric: She has a normal mood and affect  Her behavior is normal  Judgment and thought content normal    Nursing note and vitals reviewed        Medications:    Current Outpatient Medications:     allopurinol (ZYLOPRIM) 300 mg tablet, Take 300 mg by mouth daily, Disp: , Rfl:     aspirin 81 MG tablet, Take 81 mg by mouth every morning  , Disp: , Rfl:     bumetanide (BUMEX) 2 mg tablet, Take 2 mg daily in AM except on M-W-F take 2mg twice a day, Disp: 135 tablet, Rfl: 1    cholecalciferol (VITAMIN D3) 1,000 units tablet, Take 1 tablet by mouth daily, Disp: , Rfl:     gabapentin (NEURONTIN) 300 mg capsule, Take 1 capsule (300 mg total) by mouth daily at bedtime, Disp: 90 capsule, Rfl: 0    halobetasol (ULTRAVATE) 0 05 % cream, , Disp: , Rfl: 2    levothyroxine 112 mcg tablet, TAKE 1 TABLET EVERY DAY, Disp: 90 tablet, Rfl: 1    losartan (COZAAR) 25 mg tablet, Take 1 tablet (25 mg total) by mouth 2 (two) times a day, Disp: 180 tablet, Rfl: 3    Omega-3-acid Ethyl Esters & D3 1 & 1000 GM & UNIT KIT, Take 2 capsules by mouth 2 (two) times a day, Disp: , Rfl:     omeprazole (PriLOSEC) 20 mg delayed release capsule, Take 1 capsule (20 mg total) by mouth every morning, Disp: 90 capsule, Rfl: 1    propranolol (INDERAL) 80 mg tablet, TAKE 1/2 TABLET EVERY 12 HOURS DAILY, Disp: 90 tablet, Rfl: 3    ferrous sulfate 324 (65 Fe) mg, Take 1 tablet (324 mg total) by mouth daily before breakfast, Disp: 30 tablet, Rfl: 3    Laboratory Results:  Results from last 7 days   Lab Units 11/04/19  0844   WBC Thousand/uL 6 07   HEMOGLOBIN g/dL 11 5   HEMATOCRIT % 37 3   PLATELETS Thousands/uL 160   POTASSIUM mmol/L 3 7   CHLORIDE mmol/L 108   CO2 mmol/L 29   BUN mg/dL 29*   CREATININE mg/dL 1 32*   CALCIUM mg/dL 9 5   MAGNESIUM mg/dL 1 9   PHOSPHORUS mg/dL 3 2       Results for orders placed or performed in visit on 11/04/19   CBC and differential   Result Value Ref Range    WBC 6 07 4 31 - 10 16 Thousand/uL    RBC 4 24 3 81 - 5 12 Million/uL    Hemoglobin 11 5 11 5 - 15 4 g/dL    Hematocrit 37 3 34 8 - 46 1 %    MCV 88 82 - 98 fL    MCH 27 1 26 8 - 34 3 pg    MCHC 30 8 (L) 31 4 - 37 4 g/dL    RDW 16 3 (H) 11 6 - 15 1 %    MPV 11 5 8 9 - 12 7 fL    Platelets 995 302 - 749 Thousands/uL    nRBC 0 /100 WBCs    Neutrophils Relative 72 43 - 75 %    Immat GRANS % 1 0 - 2 %    Lymphocytes Relative 14 14 - 44 %    Monocytes Relative 9 4 - 12 %    Eosinophils Relative 3 0 - 6 %    Basophils Relative 1 0 - 1 %    Neutrophils Absolute 4 46 1 85 - 7 62 Thousands/µL    Immature Grans Absolute 0 03 0 00 - 0 20 Thousand/uL    Lymphocytes Absolute 0 83 0 60 - 4 47 Thousands/µL    Monocytes Absolute 0 56 0 17 - 1 22 Thousand/µL    Eosinophils Absolute 0 15 0 00 - 0 61 Thousand/µL    Basophils Absolute 0 04 0 00 - 0 10 Thousands/µL   Comprehensive metabolic panel   Result Value Ref Range    Sodium 144 136 - 145 mmol/L    Potassium 3 7 3 5 - 5 3 mmol/L    Chloride 108 100 - 108 mmol/L    CO2 29 21 - 32 mmol/L    ANION GAP 7 4 - 13 mmol/L    BUN 29 (H) 5 - 25 mg/dL    Creatinine 1 32 (H) 0 60 - 1 30 mg/dL    Glucose, Fasting 102 (H) 65 - 99 mg/dL    Calcium 9 5 8 3 - 10 1 mg/dL    AST 14 5 - 45 U/L    ALT 20 12 - 78 U/L    Alkaline Phosphatase 132 (H) 46 - 116 U/L    Total Protein 7 2 6 4 - 8 2 g/dL    Albumin 3 6 3 5 - 5 0 g/dL    Total Bilirubin 0 54 0 20 - 1 00 mg/dL    eGFR 38 ml/min/1 73sq m   Lipid panel   Result Value Ref Range    Cholesterol 194 50 - 200 mg/dL    Triglycerides 122 <=150 mg/dL    HDL, Direct 46 >=40 mg/dL    LDL Calculated 124 (H) 0 - 100 mg/dL    Non-HDL-Chol (CHOL-HDL) 148 mg/dl   Uric acid   Result Value Ref Range    Uric Acid 6 0 2 0 - 6 8 mg/dL   Phosphorus   Result Value Ref Range    Phosphorus 3 2 2 3 - 4 1 mg/dL   Protein / creatinine ratio, urine   Result Value Ref Range    Creatinine, Ur 98 4 mg/dL    Protein Urine Random 15 mg/dL    Prot/Creat Ratio, Ur 0 15 (H) 0 00 - 0 10   PTH, intact   Result Value Ref Range    PTH 89 9 (H) 18 4 - 80 1 pg/mL   Urinalysis with microscopic   Result Value Ref Range    Clarity, UA Cloudy     Color, UA Yellow     Specific Albia, UA 1 015 1 003 - 1 030    pH, UA 5 5 4 5, 5 0, 5 5, 6 0, 6 5, 7 0, 7 5, 8 0    Glucose, UA Negative Negative mg/dl    Ketones, UA Negative Negative mg/dl    Blood, UA Negative Negative    Protein, UA Negative Negative mg/dl    Nitrite, UA Negative Negative    Bilirubin, UA Negative Negative    Urobilinogen, UA 0 2 0 2, 1 0 E U /dl E U /dl    Leukocytes, UA Small (A) Negative    WBC, UA 10-20 (A) None Seen, 0-5, 5-55, 5-65 /hpf    RBC, UA None Seen None Seen, 0-5 /hpf    Hyaline Casts, UA None Seen None Seen /lpf    Bacteria, UA Occasional None Seen, Occasional /hpf    Epithelial Cells Moderate (A) None Seen, Occasional /hpf   Vitamin D 25 hydroxy   Result Value Ref Range    Vit D, 25-Hydroxy 50 7 30 0 - 100 0 ng/mL   Magnesium   Result Value Ref Range    Magnesium 1 9 1 6 - 2 6 mg/dL

## 2019-11-20 DIAGNOSIS — E03.9 HYPOTHYROIDISM, UNSPECIFIED TYPE: ICD-10-CM

## 2019-11-20 RX ORDER — LEVOTHYROXINE SODIUM 112 UG/1
112 TABLET ORAL DAILY
Qty: 90 TABLET | Refills: 1 | Status: SHIPPED | OUTPATIENT
Start: 2019-11-20 | End: 2020-03-13 | Stop reason: SDUPTHER

## 2019-11-20 NOTE — TELEPHONE ENCOUNTER
[place] [facility] [place]

## 2019-11-22 ENCOUNTER — LAB (OUTPATIENT)
Dept: LAB | Facility: CLINIC | Age: 79
End: 2019-11-22
Payer: MEDICARE

## 2019-11-22 ENCOUNTER — TELEPHONE (OUTPATIENT)
Dept: NEPHROLOGY | Facility: CLINIC | Age: 79
End: 2019-11-22

## 2019-11-22 DIAGNOSIS — I10 BENIGN ESSENTIAL HTN: ICD-10-CM

## 2019-11-22 DIAGNOSIS — N18.30 CKD (CHRONIC KIDNEY DISEASE), STAGE III (HCC): ICD-10-CM

## 2019-11-22 LAB
ANION GAP SERPL CALCULATED.3IONS-SCNC: 6 MMOL/L (ref 4–13)
BUN SERPL-MCNC: 31 MG/DL (ref 5–25)
CALCIUM SERPL-MCNC: 9.6 MG/DL (ref 8.3–10.1)
CHLORIDE SERPL-SCNC: 107 MMOL/L (ref 100–108)
CO2 SERPL-SCNC: 29 MMOL/L (ref 21–32)
CREAT SERPL-MCNC: 1.38 MG/DL (ref 0.6–1.3)
GFR SERPL CREATININE-BSD FRML MDRD: 36 ML/MIN/1.73SQ M
GLUCOSE P FAST SERPL-MCNC: 103 MG/DL (ref 65–99)
POTASSIUM SERPL-SCNC: 3.8 MMOL/L (ref 3.5–5.3)
SODIUM SERPL-SCNC: 142 MMOL/L (ref 136–145)

## 2019-11-22 PROCEDURE — 36415 COLL VENOUS BLD VENIPUNCTURE: CPT

## 2019-11-22 PROCEDURE — 80048 BASIC METABOLIC PNL TOTAL CA: CPT

## 2019-11-22 NOTE — RESULT ENCOUNTER NOTE
Hello    Patient normally is followed up by Ms [facility]  Can you please let the patient know that the renal function is stable  Electrolytes are stable  She can continue with the plan from the last appointment  Repeat blood work before next appointment  She should have lab slips already for this      Thank you    np

## 2019-11-22 NOTE — TELEPHONE ENCOUNTER
Spoke with the patient and she is aware of her lab test results and she has no further questions at this time  She is aware to have lab work completed to prior appointment and she does have her lab slips        Marquise Bryson MA

## 2019-11-22 NOTE — TELEPHONE ENCOUNTER
----- Message from Carla Eugene MD sent at 11/22/2019  3:01 PM EST -----  Hello    Patient normally is followed up by Ms Radha Odom  Can you please let the patient know that the renal function is stable  Electrolytes are stable  She can continue with the plan from the last appointment  Repeat blood work before next appointment  She should have lab slips already for this      Thank you    np

## 2019-12-04 NOTE — PROGRESS NOTES
Awaiting rest of blood work  PTH higher than prior  But okay for now  Vitamin-D appropriate  Awaiting BM P  UA with small amount of WBC  Once a complete sets of blood work are return, will review with the patient if any urinary symptoms 
none

## 2020-01-02 ENCOUNTER — OFFICE VISIT (OUTPATIENT)
Dept: PODIATRY | Facility: CLINIC | Age: 80
End: 2020-01-02
Payer: MEDICARE

## 2020-01-02 VITALS
BODY MASS INDEX: 40.22 KG/M2 | DIASTOLIC BLOOD PRESSURE: 80 MMHG | WEIGHT: 213 LBS | HEIGHT: 61 IN | HEART RATE: 74 BPM | RESPIRATION RATE: 16 BRPM | SYSTOLIC BLOOD PRESSURE: 142 MMHG

## 2020-01-02 DIAGNOSIS — L84 CORNS: ICD-10-CM

## 2020-01-02 DIAGNOSIS — B35.3 TINEA PEDIS OF BOTH FEET: Primary | ICD-10-CM

## 2020-01-02 DIAGNOSIS — M79.671 PAIN IN BOTH FEET: ICD-10-CM

## 2020-01-02 DIAGNOSIS — B35.1 ONYCHOMYCOSIS: ICD-10-CM

## 2020-01-02 DIAGNOSIS — E11.42 DIABETIC POLYNEUROPATHY ASSOCIATED WITH TYPE 2 DIABETES MELLITUS (HCC): ICD-10-CM

## 2020-01-02 DIAGNOSIS — M79.672 PAIN IN BOTH FEET: ICD-10-CM

## 2020-01-02 DIAGNOSIS — I70.209 PERIPHERAL ARTERIOSCLEROSIS (HCC): ICD-10-CM

## 2020-01-02 DIAGNOSIS — I70.209 ATHEROSCLEROSIS OF ARTERIES OF EXTREMITIES (HCC): ICD-10-CM

## 2020-01-02 PROCEDURE — 99212 OFFICE O/P EST SF 10 MIN: CPT | Performed by: PODIATRIST

## 2020-01-02 PROCEDURE — 11056 PARNG/CUTG B9 HYPRKR LES 2-4: CPT | Performed by: PODIATRIST

## 2020-01-02 RX ORDER — CLOTRIMAZOLE AND BETAMETHASONE DIPROPIONATE 10; .64 MG/G; MG/G
CREAM TOPICAL 2 TIMES DAILY
Qty: 45 G | Refills: 1 | Status: SHIPPED | OUTPATIENT
Start: 2020-01-02 | End: 2020-01-02

## 2020-01-02 RX ORDER — CLOTRIMAZOLE AND BETAMETHASONE DIPROPIONATE 10; .64 MG/G; MG/G
CREAM TOPICAL 2 TIMES DAILY
Qty: 45 G | Refills: 1 | Status: SHIPPED | OUTPATIENT
Start: 2020-01-02 | End: 2020-02-29 | Stop reason: HOSPADM

## 2020-01-02 NOTE — PROGRESS NOTES
Assessment   Pain upon ambulation   Mycosis of nail   Peripheral artery disease   Callus formation      Plan    All nails debrided   Plantar calluses debrided without pain or complication  Add topical antifungal           History of Present Illness   HPI: Patient complains of pain in her feet with ambulation  She is pain around the big toe joints  There is no history of trauma  Today the patient is concerned with her swollen legs  She does not have pain in her legs, however, they are itchy and red  She suffers from edema  She does take water pill  Patient is now been diagnosed with renal compromise       Review of Systems        Constitutional: No fever, no chills, feels well, no tiredness, no recent weight gain or loss       Eyes: No complaints of eyesight problems, no red eyes       ENT: no loss of hearing, no nosebleeds, no sore throat       Cardiovascular: No complaints of chest pain, no palpitations, no leg claudication or lower extremity edema       Respiratory: no compliants of shortness of breath, no wheezing, no cough       Gastrointestinal: no complaints of abdominal pain, no constipation, no nausea or diarrhea, no vomiting, no bloody stools       Genitourinary: no complaints of dysuria, no incontinence       Musculoskeletal: arthralgias,-- limb pain-- and-- myalgias, but-- as noted in HPI       Integumentary: no complaints of skin rash or lesion, no itching or dry skin, no skin wounds       Neurological: numbness-- and-- tingling, but-- no complaints of headache, no confusion, no numbness or tingling, no dizziness-- and-- as noted in HPI       Endocrine: No complaints of muscle weakness, no feelings of weakness, no frequent urination, no excessive thirst-- and-- as noted in HPI   feelings of weakness      Psychiatric: No suicidal thoughts, no anxiety, no feelings of depression       Active Problems   1  Abdominal pain (789 00) (R10 9)   2  Acute bronchitis (466 0) (J20 9)   3  Acute on chronic diastolic congestive heart failure (428 33,428 0) (I50 33)   4  Ankle pain, unspecified laterality   5  Arthropathy of knee (716 96) (M17 10)   6  Atherosclerosis of arteries of extremities (440 20) (I70 209)   7  Benign essential hypertension (401 1) (I10)   8  Breast pain (611 71) (N64 4)   9  Bunion, unspecified laterality   10  Callus (700) (L84)   11  Cellulitis (682 9) (L03 90)   12  Chronic low back pain (724 2,338 29) (M54 5,G89 29)   13  Chronic reflux esophagitis (530 11) (K21 0)   14  Chronic venous insufficiency (459 81) (I87 2)   15  CKD (chronic kidney disease), stage III (585 3) (N18 3)   85  UIVIUTAZ systolic and diastolic HF (heart failure) (428 40) (I50 40)   17  Dehydration (276 51) (E86 0)   18  Dermatitis (692 9) (L30 9)   19  Difficulty in walking (719 7) (R26 2)   20  Discoloration of nail (703 8) (L60 8)   21  Diverticulitis of colon (562 11) (K57 32)   22  Dizziness (780 4) (R42)   23  Dyspnea on exertion (786 09) (R06 09)   24  Dystrophic nail (703 8) (L60 3)   25  Dysuria (788 1) (R30 0)   26  Edema (782 3) (R60 9)   27  Electrolyte or fluid disorder (276 9) (E87 8)   28  Elevated triglycerides with high cholesterol (272 2) (E78 2)   29  Encounter for screening for cardiovascular disorders (V81 2) (Z13 6)   30  Encounter for screening mammogram for breast cancer (V76 12) (Z12 31)   31  Esophageal reflux (530 81) (K21 9)   32  Flu vaccine need (V04 81) (Z23)   33  Flu vaccine need (V04 81) (Z23)   34  Gout (274 9) (M10 9)   35  Headache (784 0) (R51)   36  Hiatal hernia (553 3) (K44 9)   37  Hyperlipemia (272 4) (E78 5)   38  Hypertension (401 9) (I10)   39  Hyperuricemia (790 6) (E79 0)   40  Hypothyroidism (244 9) (E03 9)   41  Infectious diarrhea (009 2) (A09)   42  Knee pain (719 46) (M25 569)   43  Leg swelling (729 81) (M79 89)   44  Localized osteoarthritis of knees, bilateral (715 36) (M17 0)   45  Lumbar canal stenosis (724 02) (M48 061)   46  Lumbar disc herniation with radiculopathy (722 10) (M51 16)   47  Lumbar radiculopathy (724 4) (M54 16)   48  Lumbar stenosis with neurogenic claudication (724 03) (M48 062)   49  Lymphedema (457 1) (I89 0)   50  Medicare annual wellness visit, initial (V70 0) (Z00 00)   51  Medicare annual wellness visit, subsequent (V70 0) (Z00 00)   52  Morbid obesity with body mass index of 40 0-44 9 in adult (278 01,V85 41)      (E66 01,Z68 41)   53  Nails Onychauxis (703 8)   54  Nausea (787 02) (R11 0)   55  Need for pneumococcal vaccination (V03 82) (Z23)   56  Need for prophylactic vaccination and inoculation against influenza (V04 81) (Z23)   57  Need for shingles vaccine (V04 89) (Z23)   58  Nephrolithiasis (592 0) (N20 0)   59  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)   60  Obstructive sleep apnea (327 23) (G47 33)   61  Onychomycosis (110 1) (B35 1)   62  Orthopnea (786 02) (R06 01)   63  Osteoarthritis, localized, knee (715 36) (M17 10)   64  Osteoporosis screening (V82 81) (Z13 820)   65  Other specified anxiety disorders (300 09) (F41 8)   66  Other specified inflammatory spondylopathies, 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) (M46 80)   67  Overweight (278 02) (E66 3)   68  Pain in both feet (729 5) (M79 671,M79 672)   69  Pain in wrist joint (719 43) (M25 539)   70  Palpitations (785 1) (R00 2)   71  Pes planus, unspecified laterality (734) (M21 40)   72  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)   73  Pseudogout of left wrist (260 96,210 20) (M11 232)   74  Renal disorder (593 9) (N28 9)   75  Rupture of popliteal cyst (727 51) (M66 0)   76  Screening for colon cancer (V76 51) (Z12 11)   77  Screening for diabetes mellitus (DM) (V77 1) (Z13 1)   78  Screening for genitourinary condition (V81 6) (Z13 89)   79  Screening for malignant neoplasm of breast (V76 10) (Z12 31)   80  Short unilateral neuralgiform headache, conjunctival injection/tearing (339 05) (G44 059)   81  Somnolence, daytime (780 54) (R40 0)   82  Spinal stenosis of lumbar region (724 02) (M48 061)   83  Tarsal tunnel syndrome (355 5) (G57 50)   84  Tenderness in limb (729 5) (M79 609)   85  Tinea pedis (110 4) (L79 2)   86  Umbilical hernia (284 2) (K42 9)   87  Visit for screening mammogram (V76 12) (Z12 31)     Past Medical History    · Benign essential hypertension (401 1) (I10)   · Depression screen (V79 0) (Z13 89)   · Hiatal hernia (553 3) (K44 9)   · History of abdominal pain (V13 89) (R45 354)   · History of abdominal pain (V13 89) (S45 693)   · History of arthritis (V13 4) (Z87 39)   · History of backache (V13 59) (Z87 39)   · History of cataract (V12 49) (Z86 69)   · History of eczema (V13 3) (Z87 2)   · History of heart failure (V12 59) (Z86 79)   · History of hepatitis (V12 09) (Z86 19)   · History of onychomycosis (V12 09) (Z86 19)   · History of sleep apnea (V13 89) (Z86 69)   · History of Orthopnea (786 02) (R06 01)     The active problems and past medical history were reviewed and updated today       Surgical History    · History of Complete Colonoscopy   · History of Hysterectomy   · History of Incisional Hernia Repair - Incarcerated   · History of Knee Surgery     The surgical history was reviewed and updated today        Family History   Mother    · Family history of Coronary artery disease   · Family history of arthritis (V17 7) (Z82 61)   · Denied: Family history of depression   · Family history of diabetes mellitus (V18 0) (Z83 3)   · Family history of hypertension (V17 49) (Z82 49)   · Denied: Family history of substance abuse  Father    · Denied: Family history of depression   · Family history of hypertension (V17 49) (Z82 49)   · Denied: Family history of substance abuse  Sibling    · Denied: Family history of depression   · Denied: Family history of substance abuse  Family History    · Family history of arthritis (V17 7) (Z82 61)     The family history was reviewed and updated today        Social History    · Denied: History of Alcohol Use (History)   · Daily Coffee Consumption (___ Cups/Day)   · Lack of exercise (V69 0) (Z72 3)   ·    · Never a smoker   · Never a smoker   · Sleeps 6 -7 hours a day  The social history was reviewed and updated today  The social history was reviewed and is unchanged     The medication list was reviewed and updated today       Allergies   1  No Known Drug Allergies     Physical Exam   Left Foot: Appearance: Normal except as noted: excessive pronation-- and-- pes planus     Right Foot: Appearance: Normal except as noted: excessive pronation-- and-- pes planus  Tenderness: None except the calcaneous,-- medial calcaneous-- and-- insertion of the plantar fascia     Left Ankle: Appearance: Normal except ecchymosis-- and-- swelling laterally  ROM: limited ROM in all planes    Right Ankle: ROM: limited ROM in all planes Motor: diffuse weakness     Neurological Exam: performed  Light touch was intact bilaterally  Vibratory sensation was intact bilaterally  Response to monofilament test was intact bilaterally  Deep tendon reflexes: patellar reflex present bilaterally-- and-- achilles reflex present bilaterally     Vascular Exam: performed Dorsalis pedis pulses were 1/4 bilaterally  Posterior tibial pulses were 1/4 bilaterally  Elevation Pallor: diminished bilaterally  Capillary refill time was greater than 3 seconds bilaterally-- and-- Q9 findings bilateral  Negative digital hair noted  Positive abnormal cooling bilateral  Edema: moderate bilaterally-- and-- 6/7 pitting edema  Negative Homans sign     Toenails: All of the toenails were elongated,-- hypertrophied,-- discolored-- and-- Mycotic with onychogryphosis  Note is made of bilateral tinea pedis in moccasin foot  Distribution          Socks and shoes removed, Right Foot Findings: swollen, erythematous and dry       The sensory exam showed diminished vibratory sensation at the level of the toes   Diminished tactile sensation with monofilament testing throughout the right foot       Socks and shoes removed, Left Foot Findings: swollen, erythematous and dry       The sensory exam showed diminished vibratory sensation at the level of the toes  Diminished tactile sensation with monofilament testing throughout the left foot      Capillary refills findings on the right were delayed in the toes       Pulses:      1+ in the posterior tibialis on the right      1+ in the dorsalis pedis on the right       Capillary refills findings on the left were delayed in the toes       Pulses:      1+ in the posterior tibialis on the left      1+ in the dorsalis pedis on the left       Assign Risk Category: 2: Loss of protective sensation with or without weakness, deformity, callus, pre-ulcer, or history of ulceration  High risk     Hyperkeratosis: present on both first toes,-- present on both first sub metatarsals-- and-- Bilateral plantar moccasin tinea pedis noted     Shoe Gear Evaluation: performed ()  Recommendation(s): SAS style      Patient's shoes and socks removed  Assign Risk Category:  Deformity present;  Loss of protective sensation; Weak pulses       Risk: 2

## 2020-01-05 ENCOUNTER — OFFICE VISIT (OUTPATIENT)
Dept: URGENT CARE | Facility: CLINIC | Age: 80
End: 2020-01-05
Payer: MEDICARE

## 2020-01-05 VITALS
DIASTOLIC BLOOD PRESSURE: 76 MMHG | HEIGHT: 61 IN | HEART RATE: 67 BPM | SYSTOLIC BLOOD PRESSURE: 156 MMHG | RESPIRATION RATE: 16 BRPM | TEMPERATURE: 97.8 F | OXYGEN SATURATION: 95 % | BODY MASS INDEX: 40.22 KG/M2 | WEIGHT: 213 LBS

## 2020-01-05 DIAGNOSIS — R21 RASH: Primary | ICD-10-CM

## 2020-01-05 PROCEDURE — 99213 OFFICE O/P EST LOW 20 MIN: CPT | Performed by: NURSE PRACTITIONER

## 2020-01-05 PROCEDURE — G0463 HOSPITAL OUTPT CLINIC VISIT: HCPCS | Performed by: NURSE PRACTITIONER

## 2020-01-05 RX ORDER — PREDNISONE 20 MG/1
20 TABLET ORAL 2 TIMES DAILY WITH MEALS
Qty: 10 TABLET | Refills: 0 | Status: SHIPPED | OUTPATIENT
Start: 2020-01-05 | End: 2020-01-10

## 2020-01-05 NOTE — PROGRESS NOTES
[employer]        NAME: Miller Darden is a [de-identified] y o  female  : 1940    MRN: [id number]  DATE: 2020  TIME: 3:42 PM    Assessment and Plan   1  Rash  - predniSONE 20 mg tablet; Take 1 tablet (20 mg total) by mouth 2 (two) times a day with meals for 5 days  Dispense: 10 tablet; Refill: 0      Patient Instructions   Prednisone 20mg twice daily with food for 5 days  Benadryl as needed  Follow up with PCP in 3-5 days  Chief Complaint     Chief Complaint   Patient presents with    Rash     Pt presents with rash that started under her right breast and is now all over her abdomen- she reports its itchy but mainly around her belly button  History of Present Illness       Here for rash  Started yesterday  On abdomen  Itchy  No pain  No change to laundry detergents  No new bath or body products  No new meds  No new foods  No fever  No sore throat  No other symptoms  No sick contacts  Review of Systems   Review of Systems   Constitutional: Negative for chills, diaphoresis, fatigue and fever  HENT: Negative for sore throat  Gastrointestinal: Negative for diarrhea, nausea and vomiting  Skin: Positive for rash  Neurological: Negative for headaches  Hematological: Negative for adenopathy           Current Medications       Current Outpatient Medications:     allopurinol (ZYLOPRIM) 300 mg tablet, Take 300 mg by mouth daily, Disp: , Rfl:     aspirin 81 MG tablet, Take 81 mg by mouth every morning  , Disp: , Rfl:     bumetanide (BUMEX) 2 mg tablet, Take 2 mg daily in AM except on -- take 2mg twice a day, Disp: 135 tablet, Rfl: 1    cholecalciferol (VITAMIN D3) 1,000 units tablet, Take 1 tablet by mouth daily, Disp: , Rfl:     clotrimazole-betamethasone (LOTRISONE) 1-0 05 % cream, Apply topically 2 (two) times a day for 25 days, Disp: 45 g, Rfl: 1    ferrous sulfate 324 (65 Fe) mg, Take 1 tablet (324 mg total) by mouth daily before breakfast, Disp: 30 tablet, Rfl: 3    gabapentin (NEURONTIN) 300 mg capsule, Take 1 capsule (300 mg total) by mouth daily at bedtime, Disp: 90 capsule, Rfl: 0    halobetasol (ULTRAVATE) 0 05 % cream, , Disp: , Rfl: 2    levothyroxine 112 mcg tablet, Take 1 tablet (112 mcg total) by mouth daily, Disp: 90 tablet, Rfl: 1    losartan (COZAAR) 25 mg tablet, Take 1 tablet (25 mg total) by mouth 2 (two) times a day, Disp: 180 tablet, Rfl: 3    Omega-3-acid Ethyl Esters & D3 1 & 1000 GM & UNIT KIT, Take 2 capsules by mouth 2 (two) times a day, Disp: , Rfl:     omeprazole (PriLOSEC) 20 mg delayed release capsule, Take 1 capsule (20 mg total) by mouth every morning, Disp: 90 capsule, Rfl: 1    propranolol (INDERAL) 80 mg tablet, TAKE 1/2 TABLET EVERY 12 HOURS DAILY, Disp: 90 tablet, Rfl: 3    Current Allergies     Allergies as of 2020    (No Known Allergies)            The following portions of the patient's history were reviewed and updated as appropriate: allergies, current medications, past family history, past medical history, past social history, past surgical history and problem list      Past Medical History:   Diagnosis Date    Anxiety     Arthritis     joints    Cataract     Disease of thyroid gland     Eczema     GERD (gastroesophageal reflux disease)     Gout     Heart failure ([facility] [address] )     Hepatitis     Hiatal hernia     Hypertension     Onychomycosis     last assessed: 16    Orthopnea     resolved: 16    Sleep apnea     wears CPAP       Past Surgical History:   Procedure Laterality Date    ABDOMINAL SURGERY          BREAST SURGERY Right     cyst removal    CARPAL TUNNEL RELEASE Left     CARPAL TUNNEL RELEASE Right     CATARACT EXTRACTION       SECTION  1960    x1    COLONOSCOPY N/A 2018    Procedure: COLONOSCOPY;  Surgeon: Scarlet Tse MD;  Location: [place] [address] GI LAB;   Service: Gastroenterology    DILATION AND CURETTAGE OF UTERUS  3179    EYE SURGERY Left  cataracts    HERNIA REPAIR      umbilical hernia    HYSTERECTOMY      INCISIONAL HERNIA REPAIR      incarcerated    KNEE ARTHROSCOPY Right     ME XCAPSL CTRC RMVL INSJ IO LENS PROSTH W/O ECP Right 3/27/2017    Procedure: EXTRACTION EXTRACAPSULAR CATARACT PHACO INTRAOCULAR LENS (IOL); Surgeon: Liss Gardner MD;  Location[facility] MAIN OR;  Service: Ophthalmology       Family History   Problem Relation Age of Onset    Diabetes Mother     Coronary artery disease Mother     Arthritis Mother     Hypertension Mother     Hypertension Father     Diabetes Brother     Diabetes Son     Arthritis Family          Medications have been verified  Objective   BP (!) 182/76   Pulse 67   Temp 97 8 °F (36 6 °C)   Resp 16   Ht 5' 1" (1 549 m)   Wt 96 6 kg (213 lb)   SpO2 95%   BMI 40 25 kg/m²        Physical Exam     Physical Exam   Constitutional: She appears well-developed and well-nourished  HENT:   Mouth/Throat: Oropharynx is clear and moist    Cardiovascular: Normal rate  Pulmonary/Chest: Effort normal and breath sounds normal    Neurological: She is alert  Skin: Skin is warm and dry  Rash (diffuse macular rash to anterior abdomen  ) noted  Psychiatric: She has a normal mood and affect  Vitals reviewed

## 2020-01-05 NOTE — PATIENT INSTRUCTIONS
Prednisone 20mg twice daily with food for 5 days  Benadryl as needed  Follow up with PCP in 3-5 days

## 2020-01-28 ENCOUNTER — OFFICE VISIT (OUTPATIENT)
Dept: FAMILY MEDICINE CLINIC | Facility: CLINIC | Age: 80
End: 2020-01-28
Payer: MEDICARE

## 2020-01-28 ENCOUNTER — TELEPHONE (OUTPATIENT)
Dept: NEPHROLOGY | Facility: CLINIC | Age: 80
End: 2020-01-28

## 2020-01-28 VITALS
SYSTOLIC BLOOD PRESSURE: 158 MMHG | OXYGEN SATURATION: 96 % | HEIGHT: 61 IN | DIASTOLIC BLOOD PRESSURE: 60 MMHG | WEIGHT: 209 LBS | BODY MASS INDEX: 39.46 KG/M2 | HEART RATE: 58 BPM

## 2020-01-28 DIAGNOSIS — K21.9 GASTROESOPHAGEAL REFLUX DISEASE WITHOUT ESOPHAGITIS: ICD-10-CM

## 2020-01-28 DIAGNOSIS — E03.9 HYPOTHYROIDISM, UNSPECIFIED TYPE: ICD-10-CM

## 2020-01-28 DIAGNOSIS — E79.0 HYPERURICEMIA: ICD-10-CM

## 2020-01-28 DIAGNOSIS — E66.01 MORBID OBESITY WITH BODY MASS INDEX OF 40.0-44.9 IN ADULT (HCC): ICD-10-CM

## 2020-01-28 DIAGNOSIS — I50.41 ACUTE COMBINED SYSTOLIC AND DIASTOLIC HEART FAILURE (HCC): ICD-10-CM

## 2020-01-28 DIAGNOSIS — E78.5 HYPERLIPIDEMIA, UNSPECIFIED HYPERLIPIDEMIA TYPE: ICD-10-CM

## 2020-01-28 DIAGNOSIS — I10 BENIGN ESSENTIAL HYPERTENSION: Primary | ICD-10-CM

## 2020-01-28 DIAGNOSIS — N18.30 CKD (CHRONIC KIDNEY DISEASE), STAGE III (HCC): ICD-10-CM

## 2020-01-28 PROCEDURE — 99214 OFFICE O/P EST MOD 30 MIN: CPT | Performed by: FAMILY MEDICINE

## 2020-01-28 RX ORDER — ALLOPURINOL 300 MG/1
300 TABLET ORAL DAILY
Qty: 90 TABLET | Refills: 1 | Status: SHIPPED | OUTPATIENT
Start: 2020-01-28 | End: 2020-02-29 | Stop reason: HOSPADM

## 2020-01-28 RX ORDER — OMEPRAZOLE 20 MG/1
20 CAPSULE, DELAYED RELEASE ORAL EVERY MORNING
Qty: 90 CAPSULE | Refills: 1 | Status: SHIPPED | OUTPATIENT
Start: 2020-01-28 | End: 2020-07-10 | Stop reason: SDUPTHER

## 2020-01-28 NOTE — ASSESSMENT & PLAN NOTE
Patient's blood pressure is not at goal    Patient was suggested to contact Nephrology after medication changes were made at the last visit  However patient was unable to do so  Suggested to contact Nephrology ASAP for further instructions on dose modifications

## 2020-01-28 NOTE — ASSESSMENT & PLAN NOTE
Patient's cholesterol is elevated  Different cholesterol-lowering options discussed with patient  Patient does not want to start any medications yet  Suggested low-fat diet and regular exercise  Suggested over-the-counter omega-3 supplements  Will recheck lipid panel at 6 month interval and follow up thereafter  Kayleigh Javier

## 2020-01-28 NOTE — PROGRESS NOTES
Subjective:      Patient ID: Cecily Gomez is a [de-identified] y o  female  Hypertension   This is a chronic problem  The current episode started more than 1 year ago  The problem is uncontrolled  Pertinent negatives include no chest pain, headaches, palpitations or shortness of breath  Risk factors for coronary artery disease include post-menopausal state  Treatments tried: Losartan 25 milligram b i d , propranolol 40 milligram b i d    The current treatment provides mild improvement  There are no compliance problems  Identifiable causes of hypertension include a thyroid problem  Thyroid Problem   Presents for follow-up visit  Patient reports no depressed mood, heat intolerance, palpitations or tremors  The symptoms have been stable (Levothyroxine 112 micrograms daily  )  Heartburn   She complains of heartburn  She reports no chest pain  This is a chronic problem  The current episode started more than 1 year ago  The problem occurs occasionally  The heartburn is located in the substernum  The heartburn is of mild intensity  The symptoms are aggravated by stress and certain foods  She has tried a PPI for the symptoms  The treatment provided significant relief  Patient has history of chronic gout  Uric as stable allopurinol 300 milligram   Patient is requesting medication refill  Patient has history of chronic renal failure, an appointment with nephrology after weeks  Since her blood pressure is noted to be elevated today suggested to contact nephrologist ASAP  Will I will also send a note to Dr Michela Salas  Overall patient states that she feels fine  Denies any symptoms of headache/chest pain or shortness of breath      Past Medical History:   Diagnosis Date    Anxiety     Arthritis     joints    Cataract     Disease of thyroid gland     Eczema     GERD (gastroesophageal reflux disease)     Gout     Heart failure (HCC)     Hepatitis     Hiatal hernia     Hypertension     Onychomycosis     last assessed: 16    Orthopnea     resolved: 16    Sleep apnea     wears CPAP       Family History   Problem Relation Age of Onset    Diabetes Mother     Coronary artery disease Mother     Arthritis Mother     Hypertension Mother     Hypertension Father     Diabetes Brother     Diabetes Son     Arthritis Family        Past Surgical History:   Procedure Laterality Date    ABDOMINAL SURGERY          BREAST SURGERY Right     cyst removal    CARPAL TUNNEL RELEASE Left     CARPAL TUNNEL RELEASE Right     CATARACT EXTRACTION       SECTION  1960    x1    COLONOSCOPY N/A 2018    Procedure: COLONOSCOPY;  Surgeon: Stalin Cervantes MD;  [facility] GI LAB; Service: Gastroenterology    DILATION AND CURETTAGE OF UTERUS  1967    EYE SURGERY Left 2006    cataracts    HERNIA REPAIR      umbilical hernia    HYSTERECTOMY      INCISIONAL HERNIA REPAIR      incarcerated    KNEE ARTHROSCOPY Right     SD XCAPSL CTRC RMVL INSJ IO LENS PROSTH W/O ECP Right 3/27/2017    Procedure: EXTRACTION EXTRACAPSULAR CATARACT PHACO INTRAOCULAR LENS (IOL); Surgeon: Chuck Lozano MD;  Location: [facility] MAIN OR;  Service: Ophthalmology        reports that she has never smoked  She has never used smokeless tobacco  She reports that she does not drink alcohol or use drugs        Current Outpatient Medications:     allopurinol (ZYLOPRIM) 300 mg tablet, Take 1 tablet (300 mg total) by mouth daily, Disp: 90 tablet, Rfl: 1    aspirin 81 MG tablet, Take 81 mg by mouth every morning  , Disp: , Rfl:     bumetanide (BUMEX) 2 mg tablet, Take 2 mg daily in AM except on  take 2mg twice a day, Disp: 135 tablet, Rfl: 1    cholecalciferol (VITAMIN D3) 1,000 units tablet, Take 1 tablet by mouth daily, Disp: , Rfl:     ferrous sulfate 324 (65 Fe) mg, Take 1 tablet (324 mg total) by mouth daily before breakfast, Disp: 30 tablet, Rfl: 3    gabapentin (NEURONTIN) 300 mg capsule, Take 1 capsule (300 mg total) by mouth daily at bedtime, Disp: 90 capsule, Rfl: 0    halobetasol (ULTRAVATE) 0 05 % cream, , Disp: , Rfl: 2    levothyroxine 112 mcg tablet, Take 1 tablet (112 mcg total) by mouth daily, Disp: 90 tablet, Rfl: 1    losartan (COZAAR) 25 mg tablet, Take 1 tablet (25 mg total) by mouth 2 (two) times a day, Disp: 180 tablet, Rfl: 3    Omega-3-acid Ethyl Esters & D3 1 & 1000 GM & UNIT KIT, Take 2 capsules by mouth 2 (two) times a day, Disp: , Rfl:     omeprazole (PriLOSEC) 20 mg delayed release capsule, Take 1 capsule (20 mg total) by mouth every morning, Disp: 90 capsule, Rfl: 1    propranolol (INDERAL) 80 mg tablet, TAKE 1/2 TABLET EVERY 12 HOURS DAILY, Disp: 90 tablet, Rfl: 3    clotrimazole-betamethasone (LOTRISONE) 1-0 05 % cream, Apply topically 2 (two) times a day for 25 days, Disp: 45 g, Rfl: 1    The following portions of the patient's history were reviewed and updated as appropriate: allergies, current medications, past family history, past medical history, past social history, past surgical history and problem list     Review of Systems   Constitutional: Negative  Eyes: Negative  Respiratory: Negative  Negative for shortness of breath  Cardiovascular: Negative  Negative for chest pain and palpitations  Gastrointestinal: Positive for heartburn  Endocrine: Negative  Negative for heat intolerance  Genitourinary: Negative  Musculoskeletal: Negative  Negative for myalgias  Neurological: Negative  Negative for tremors and headaches  Psychiatric/Behavioral: Negative  Objective:    /60 (BP Location: Left arm, Patient Position: Sitting, Cuff Size: Large)   Pulse 58   Ht 5' 1" (1 549 m)   Wt 94 8 kg (209 lb)   SpO2 96%   BMI 39 49 kg/m²      Physical Exam   Constitutional: She is oriented to person, place, and time  She appears well-developed and well-nourished     HENT:   Mouth/Throat: Oropharynx is clear and moist    Eyes: Pupils are equal, round, and reactive to light  Neck: Normal range of motion  Cardiovascular: Normal rate, regular rhythm and normal heart sounds  Pulmonary/Chest: Effort normal and breath sounds normal    Abdominal: Soft  Bowel sounds are normal    Musculoskeletal: Normal range of motion  Neurological: She is alert and oriented to person, place, and time  Psychiatric: Her behavior is normal  Judgment normal          No results found for this or any previous visit (from the past 1008 hour(s))  Assessment/Plan:         Problem List Items Addressed This Visit        Digestive    Gastroesophageal reflux disease without esophagitis     Patient advised to avoid triggers  Advised to continue PPI         Relevant Medications    omeprazole (PriLOSEC) 20 mg delayed release capsule       Endocrine    Hypothyroidism     Stable with liver thyroxine 112 micrograms  Continue same  Metabolic labs at 6 month interval and follow up  Relevant Orders    TSH, 3rd generation with Free T4 reflex       Cardiovascular and Mediastinum    Benign essential hypertension - Primary     Patient's blood pressure is not at goal    Patient was suggested to contact Nephrology after medication changes were made at the last visit  However patient was unable to do so  Suggested to contact Nephrology ASAP for further instructions on dose modifications  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)       Genitourinary    CKD (chronic kidney disease), stage III (HCC)     Creatinine 1 38  Has fup with nephrology x 4 weeks         Relevant Orders    Comprehensive metabolic panel       Other    Hyperlipemia     Patient's cholesterol is elevated  Different cholesterol-lowering options discussed with patient  Patient does not want to start any medications yet  Suggested low-fat diet and regular exercise  Suggested over-the-counter omega-3 supplements  Will recheck lipid panel at 6 month interval and follow up thereafter  [place] Relevant Orders    Lipid panel    Hyperuricemia     Stable on allopurinol 300 milligram   Continue same           Relevant Medications    allopurinol (ZYLOPRIM) 300 mg tablet    Other Relevant Orders    Comprehensive metabolic panel    Morbid obesity with body mass index of 40 0-44 9 in adult [facility])

## 2020-01-28 NOTE — TELEPHONE ENCOUNTER
Hello    Thank you    Yes, if she can record and send to us - one week recordings    Thank you    np

## 2020-01-28 NOTE — ASSESSMENT & PLAN NOTE
Stable with liver thyroxine 112 micrograms  Continue same  Metabolic labs at 6 month interval and follow up

## 2020-01-28 NOTE — TELEPHONE ENCOUNTER
Pt called today stating that she saw PCP and was asked by her to call our office because her pressure was 158/60  Pt states that in the past Dr Miller had asked her to record bp for one week and send readings to us but she never did  Advised pt to do that and in meantime we will let Dr Miller know of the elevated pressure at the PCP's office

## 2020-02-10 ENCOUNTER — HOSPITAL ENCOUNTER (INPATIENT)
Facility: HOSPITAL | Age: 80
LOS: 8 days | DRG: 023 | End: 2020-02-18
Attending: EMERGENCY MEDICINE | Admitting: INTERNAL MEDICINE
Payer: MEDICARE

## 2020-02-10 ENCOUNTER — HOSPITAL ENCOUNTER (EMERGENCY)
Facility: HOSPITAL | Age: 80
End: 2020-02-10
Attending: EMERGENCY MEDICINE | Admitting: EMERGENCY MEDICINE
Payer: MEDICARE

## 2020-02-10 ENCOUNTER — APPOINTMENT (EMERGENCY)
Dept: RADIOLOGY | Facility: HOSPITAL | Age: 80
End: 2020-02-10
Payer: MEDICARE

## 2020-02-10 ENCOUNTER — APPOINTMENT (EMERGENCY)
Dept: CT IMAGING | Facility: HOSPITAL | Age: 80
End: 2020-02-10
Payer: MEDICARE

## 2020-02-10 VITALS
DIASTOLIC BLOOD PRESSURE: 63 MMHG | HEIGHT: 61 IN | BODY MASS INDEX: 39.49 KG/M2 | SYSTOLIC BLOOD PRESSURE: 151 MMHG | TEMPERATURE: 98.3 F | OXYGEN SATURATION: 93 % | HEART RATE: 81 BPM | RESPIRATION RATE: 18 BRPM

## 2020-02-10 DIAGNOSIS — I61.9 INTRAPARENCHYMAL HEMORRHAGE OF BRAIN (HCC): Primary | ICD-10-CM

## 2020-02-10 DIAGNOSIS — I62.9 INTRACRANIAL HEMORRHAGE (HCC): Primary | ICD-10-CM

## 2020-02-10 LAB
ALBUMIN SERPL BCP-MCNC: 3.2 G/DL (ref 3.5–5)
ALP SERPL-CCNC: 153 U/L (ref 46–116)
ALT SERPL W P-5'-P-CCNC: 26 U/L (ref 12–78)
ANION GAP SERPL CALCULATED.3IONS-SCNC: 10 MMOL/L (ref 4–13)
APTT PPP: 31 SECONDS (ref 23–37)
AST SERPL W P-5'-P-CCNC: 41 U/L (ref 5–45)
BASOPHILS # BLD AUTO: 0.02 THOUSANDS/ΜL (ref 0–0.1)
BASOPHILS NFR BLD AUTO: 0 % (ref 0–1)
BILIRUB SERPL-MCNC: 0.68 MG/DL (ref 0.2–1)
BUN SERPL-MCNC: 24 MG/DL (ref 5–25)
CALCIUM SERPL-MCNC: 9.3 MG/DL (ref 8.3–10.1)
CHLORIDE SERPL-SCNC: 102 MMOL/L (ref 100–108)
CO2 SERPL-SCNC: 26 MMOL/L (ref 21–32)
CREAT SERPL-MCNC: 1.05 MG/DL (ref 0.6–1.3)
EOSINOPHIL # BLD AUTO: 0.02 THOUSAND/ΜL (ref 0–0.61)
EOSINOPHIL NFR BLD AUTO: 0 % (ref 0–6)
ERYTHROCYTE [DISTWIDTH] IN BLOOD BY AUTOMATED COUNT: 15.5 % (ref 11.6–15.1)
GFR SERPL CREATININE-BSD FRML MDRD: 50 ML/MIN/1.73SQ M
GLUCOSE SERPL-MCNC: 118 MG/DL (ref 65–140)
HCT VFR BLD AUTO: 41.2 % (ref 34.8–46.1)
HGB BLD-MCNC: 13.1 G/DL (ref 11.5–15.4)
IMM GRANULOCYTES # BLD AUTO: 0.08 THOUSAND/UL (ref 0–0.2)
IMM GRANULOCYTES NFR BLD AUTO: 1 % (ref 0–2)
INR PPP: 1.13 (ref 0.84–1.19)
LYMPHOCYTES # BLD AUTO: 0.81 THOUSANDS/ΜL (ref 0.6–4.47)
LYMPHOCYTES NFR BLD AUTO: 7 % (ref 14–44)
MCH RBC QN AUTO: 27.9 PG (ref 26.8–34.3)
MCHC RBC AUTO-ENTMCNC: 31.8 G/DL (ref 31.4–37.4)
MCV RBC AUTO: 88 FL (ref 82–98)
MONOCYTES # BLD AUTO: 0.49 THOUSAND/ΜL (ref 0.17–1.22)
MONOCYTES NFR BLD AUTO: 4 % (ref 4–12)
NEUTROPHILS # BLD AUTO: 9.84 THOUSANDS/ΜL (ref 1.85–7.62)
NEUTS SEG NFR BLD AUTO: 88 % (ref 43–75)
NRBC BLD AUTO-RTO: 0 /100 WBCS
PLATELET # BLD AUTO: 193 THOUSANDS/UL (ref 149–390)
PMV BLD AUTO: 11.3 FL (ref 8.9–12.7)
POTASSIUM SERPL-SCNC: 4.9 MMOL/L (ref 3.5–5.3)
PROT SERPL-MCNC: 7.9 G/DL (ref 6.4–8.2)
PROTHROMBIN TIME: 13.9 SECONDS (ref 11.6–14.5)
RBC # BLD AUTO: 4.7 MILLION/UL (ref 3.81–5.12)
SODIUM SERPL-SCNC: 138 MMOL/L (ref 136–145)
TROPONIN I SERPL-MCNC: 0.05 NG/ML
WBC # BLD AUTO: 11.26 THOUSAND/UL (ref 4.31–10.16)

## 2020-02-10 PROCEDURE — 99291 CRITICAL CARE FIRST HOUR: CPT | Performed by: EMERGENCY MEDICINE

## 2020-02-10 PROCEDURE — 94760 N-INVAS EAR/PLS OXIMETRY 1: CPT

## 2020-02-10 PROCEDURE — 93005 ELECTROCARDIOGRAM TRACING: CPT

## 2020-02-10 PROCEDURE — 1124F ACP DISCUSS-NO DSCNMKR DOCD: CPT | Performed by: NEUROLOGICAL SURGERY

## 2020-02-10 PROCEDURE — 99285 EMERGENCY DEPT VISIT HI MDM: CPT

## 2020-02-10 PROCEDURE — 85610 PROTHROMBIN TIME: CPT | Performed by: EMERGENCY MEDICINE

## 2020-02-10 PROCEDURE — 70496 CT ANGIOGRAPHY HEAD: CPT

## 2020-02-10 PROCEDURE — 36415 COLL VENOUS BLD VENIPUNCTURE: CPT | Performed by: EMERGENCY MEDICINE

## 2020-02-10 PROCEDURE — 85730 THROMBOPLASTIN TIME PARTIAL: CPT | Performed by: EMERGENCY MEDICINE

## 2020-02-10 PROCEDURE — 80053 COMPREHEN METABOLIC PANEL: CPT | Performed by: EMERGENCY MEDICINE

## 2020-02-10 PROCEDURE — 96374 THER/PROPH/DIAG INJ IV PUSH: CPT

## 2020-02-10 PROCEDURE — 99223 1ST HOSP IP/OBS HIGH 75: CPT | Performed by: EMERGENCY MEDICINE

## 2020-02-10 PROCEDURE — 84484 ASSAY OF TROPONIN QUANT: CPT | Performed by: EMERGENCY MEDICINE

## 2020-02-10 PROCEDURE — 94660 CPAP INITIATION&MGMT: CPT

## 2020-02-10 PROCEDURE — 70498 CT ANGIOGRAPHY NECK: CPT

## 2020-02-10 PROCEDURE — 85025 COMPLETE CBC W/AUTO DIFF WBC: CPT | Performed by: EMERGENCY MEDICINE

## 2020-02-10 PROCEDURE — 71045 X-RAY EXAM CHEST 1 VIEW: CPT

## 2020-02-10 RX ORDER — HYDRALAZINE HYDROCHLORIDE 20 MG/ML
5 INJECTION INTRAMUSCULAR; INTRAVENOUS ONCE
Status: COMPLETED | OUTPATIENT
Start: 2020-02-10 | End: 2020-02-10

## 2020-02-10 RX ORDER — LEVOTHYROXINE SODIUM 112 UG/1
112 TABLET ORAL
Status: DISCONTINUED | OUTPATIENT
Start: 2020-02-11 | End: 2020-02-18 | Stop reason: HOSPADM

## 2020-02-10 RX ORDER — PANTOPRAZOLE SODIUM 40 MG/1
40 TABLET, DELAYED RELEASE ORAL
Status: DISCONTINUED | OUTPATIENT
Start: 2020-02-11 | End: 2020-02-18 | Stop reason: HOSPADM

## 2020-02-10 RX ORDER — ATORVASTATIN CALCIUM 40 MG/1
40 TABLET, FILM COATED ORAL EVERY EVENING
Status: DISCONTINUED | OUTPATIENT
Start: 2020-02-10 | End: 2020-02-18 | Stop reason: HOSPADM

## 2020-02-10 RX ORDER — LEVETIRACETAM 500 MG/1
500 TABLET ORAL EVERY 12 HOURS SCHEDULED
Status: DISPENSED | OUTPATIENT
Start: 2020-02-11 | End: 2020-02-18

## 2020-02-10 RX ORDER — MANNITOL 20 G/100ML
25 INJECTION, SOLUTION INTRAVENOUS ONCE
Status: COMPLETED | OUTPATIENT
Start: 2020-02-10 | End: 2020-02-10

## 2020-02-10 RX ADMIN — SODIUM CHLORIDE 1 MG/HR: 0.9 INJECTION, SOLUTION INTRAVENOUS at 23:54

## 2020-02-10 RX ADMIN — HYDRALAZINE HYDROCHLORIDE 5 MG: 20 INJECTION INTRAMUSCULAR; INTRAVENOUS at 20:40

## 2020-02-10 RX ADMIN — IOHEXOL 85 ML: 350 INJECTION, SOLUTION INTRAVENOUS at 21:36

## 2020-02-11 ENCOUNTER — APPOINTMENT (INPATIENT)
Dept: RADIOLOGY | Facility: HOSPITAL | Age: 80
DRG: 023 | End: 2020-02-11
Payer: MEDICARE

## 2020-02-11 ENCOUNTER — ANESTHESIA EVENT (INPATIENT)
Dept: PERIOP | Facility: HOSPITAL | Age: 80
DRG: 023 | End: 2020-02-11
Payer: MEDICARE

## 2020-02-11 ENCOUNTER — ANESTHESIA (INPATIENT)
Dept: PERIOP | Facility: HOSPITAL | Age: 80
DRG: 023 | End: 2020-02-11
Payer: MEDICARE

## 2020-02-11 LAB
ABO GROUP BLD: NORMAL
ABO GROUP BLD: NORMAL
ALBUMIN SERPL BCP-MCNC: 3.1 G/DL (ref 3.5–5)
ALP SERPL-CCNC: 132 U/L (ref 46–116)
ALT SERPL W P-5'-P-CCNC: 19 U/L (ref 12–78)
ANION GAP SERPL CALCULATED.3IONS-SCNC: 4 MMOL/L (ref 4–13)
AST SERPL W P-5'-P-CCNC: 15 U/L (ref 5–45)
ATRIAL RATE: 69 BPM
BASE EXCESS BLDA CALC-SCNC: 3 MMOL/L (ref -2–3)
BASOPHILS # BLD AUTO: 0.01 THOUSANDS/ΜL (ref 0–0.1)
BASOPHILS NFR BLD AUTO: 0 % (ref 0–1)
BILIRUB SERPL-MCNC: 0.49 MG/DL (ref 0.2–1)
BLD GP AB SCN SERPL QL: NEGATIVE
BUN SERPL-MCNC: 21 MG/DL (ref 5–25)
CA-I BLD-SCNC: 1.1 MMOL/L (ref 1.12–1.32)
CALCIUM SERPL-MCNC: 9.1 MG/DL (ref 8.3–10.1)
CHLORIDE SERPL-SCNC: 106 MMOL/L (ref 100–108)
CHOLEST SERPL-MCNC: 177 MG/DL (ref 50–200)
CO2 SERPL-SCNC: 31 MMOL/L (ref 21–32)
CREAT SERPL-MCNC: 1.14 MG/DL (ref 0.6–1.3)
EOSINOPHIL # BLD AUTO: 0 THOUSAND/ΜL (ref 0–0.61)
EOSINOPHIL NFR BLD AUTO: 0 % (ref 0–6)
ERYTHROCYTE [DISTWIDTH] IN BLOOD BY AUTOMATED COUNT: 15.8 % (ref 11.6–15.1)
EST. AVERAGE GLUCOSE BLD GHB EST-MCNC: 128 MG/DL
GFR SERPL CREATININE-BSD FRML MDRD: 46 ML/MIN/1.73SQ M
GLUCOSE SERPL-MCNC: 124 MG/DL (ref 65–140)
GLUCOSE SERPL-MCNC: 128 MG/DL (ref 65–140)
HBA1C MFR BLD: 6.1 % (ref 4.2–6.3)
HCO3 BLDA-SCNC: 26.6 MMOL/L (ref 22–28)
HCT VFR BLD AUTO: 36.9 % (ref 34.8–46.1)
HCT VFR BLD CALC: 29 % (ref 34.8–46.1)
HDLC SERPL-MCNC: 45 MG/DL
HGB BLD-MCNC: 11.6 G/DL (ref 11.5–15.4)
HGB BLDA-MCNC: 9.9 G/DL (ref 11.5–15.4)
IMM GRANULOCYTES # BLD AUTO: 0.09 THOUSAND/UL (ref 0–0.2)
IMM GRANULOCYTES NFR BLD AUTO: 1 % (ref 0–2)
LDLC SERPL CALC-MCNC: 103 MG/DL (ref 0–100)
LYMPHOCYTES # BLD AUTO: 0.65 THOUSANDS/ΜL (ref 0.6–4.47)
LYMPHOCYTES NFR BLD AUTO: 5 % (ref 14–44)
MAGNESIUM SERPL-MCNC: 2 MG/DL (ref 1.6–2.6)
MCH RBC QN AUTO: 27.6 PG (ref 26.8–34.3)
MCHC RBC AUTO-ENTMCNC: 31.4 G/DL (ref 31.4–37.4)
MCV RBC AUTO: 88 FL (ref 82–98)
MONOCYTES # BLD AUTO: 0.67 THOUSAND/ΜL (ref 0.17–1.22)
MONOCYTES NFR BLD AUTO: 5 % (ref 4–12)
NEUTROPHILS # BLD AUTO: 12.09 THOUSANDS/ΜL (ref 1.85–7.62)
NEUTS SEG NFR BLD AUTO: 89 % (ref 43–75)
NRBC BLD AUTO-RTO: 0 /100 WBCS
P AXIS: 61 DEGREES
PCO2 BLD: 28 MMOL/L (ref 21–32)
PCO2 BLD: 35.8 MM HG (ref 36–44)
PH BLD: 7.48 [PH] (ref 7.35–7.45)
PHOSPHATE SERPL-MCNC: 4 MG/DL (ref 2.3–4.1)
PLATELET # BLD AUTO: 182 THOUSANDS/UL (ref 149–390)
PMV BLD AUTO: 10.9 FL (ref 8.9–12.7)
PO2 BLD: 76 MM HG (ref 75–129)
POTASSIUM BLD-SCNC: 3.3 MMOL/L (ref 3.5–5.3)
POTASSIUM SERPL-SCNC: 3.6 MMOL/L (ref 3.5–5.3)
PR INTERVAL: 178 MS
PROT SERPL-MCNC: 6.9 G/DL (ref 6.4–8.2)
QRS AXIS: 105 DEGREES
QRSD INTERVAL: 130 MS
QT INTERVAL: 448 MS
QTC INTERVAL: 480 MS
RBC # BLD AUTO: 4.21 MILLION/UL (ref 3.81–5.12)
RH BLD: POSITIVE
RH BLD: POSITIVE
SAO2 % BLD FROM PO2: 96 % (ref 60–85)
SODIUM BLD-SCNC: 140 MMOL/L (ref 136–145)
SODIUM SERPL-SCNC: 141 MMOL/L (ref 136–145)
SPECIMEN EXPIRATION DATE: NORMAL
SPECIMEN SOURCE: ABNORMAL
T WAVE AXIS: -19 DEGREES
TRIGL SERPL-MCNC: 146 MG/DL
VENTRICULAR RATE: 69 BPM
WBC # BLD AUTO: 13.51 THOUSAND/UL (ref 4.31–10.16)

## 2020-02-11 PROCEDURE — 94002 VENT MGMT INPAT INIT DAY: CPT

## 2020-02-11 PROCEDURE — 84132 ASSAY OF SERUM POTASSIUM: CPT

## 2020-02-11 PROCEDURE — 83735 ASSAY OF MAGNESIUM: CPT | Performed by: INTERNAL MEDICINE

## 2020-02-11 PROCEDURE — 86900 BLOOD TYPING SEROLOGIC ABO: CPT | Performed by: STUDENT IN AN ORGANIZED HEALTH CARE EDUCATION/TRAINING PROGRAM

## 2020-02-11 PROCEDURE — 86920 COMPATIBILITY TEST SPIN: CPT

## 2020-02-11 PROCEDURE — 99291 CRITICAL CARE FIRST HOUR: CPT | Performed by: ANESTHESIOLOGY

## 2020-02-11 PROCEDURE — 86901 BLOOD TYPING SEROLOGIC RH(D): CPT | Performed by: INTERNAL MEDICINE

## 2020-02-11 PROCEDURE — 94760 N-INVAS EAR/PLS OXIMETRY 1: CPT

## 2020-02-11 PROCEDURE — 99223 1ST HOSP IP/OBS HIGH 75: CPT | Performed by: PSYCHIATRY & NEUROLOGY

## 2020-02-11 PROCEDURE — 82947 ASSAY GLUCOSE BLOOD QUANT: CPT

## 2020-02-11 PROCEDURE — 80061 LIPID PANEL: CPT | Performed by: INTERNAL MEDICINE

## 2020-02-11 PROCEDURE — 99232 SBSQ HOSP IP/OBS MODERATE 35: CPT | Performed by: NEUROLOGICAL SURGERY

## 2020-02-11 PROCEDURE — 86850 RBC ANTIBODY SCREEN: CPT | Performed by: STUDENT IN AN ORGANIZED HEALTH CARE EDUCATION/TRAINING PROGRAM

## 2020-02-11 PROCEDURE — 80053 COMPREHEN METABOLIC PANEL: CPT | Performed by: INTERNAL MEDICINE

## 2020-02-11 PROCEDURE — 70450 CT HEAD/BRAIN W/O DYE: CPT

## 2020-02-11 PROCEDURE — 80048 BASIC METABOLIC PNL TOTAL CA: CPT | Performed by: NEUROLOGICAL SURGERY

## 2020-02-11 PROCEDURE — 93010 ELECTROCARDIOGRAM REPORT: CPT | Performed by: INTERNAL MEDICINE

## 2020-02-11 PROCEDURE — 61322 CRNEC/CRNOT DCMPRV W/O LOBEC: CPT | Performed by: NEUROLOGICAL SURGERY

## 2020-02-11 PROCEDURE — 83036 HEMOGLOBIN GLYCOSYLATED A1C: CPT | Performed by: INTERNAL MEDICINE

## 2020-02-11 PROCEDURE — C1781 MESH (IMPLANTABLE): HCPCS | Performed by: NEUROLOGICAL SURGERY

## 2020-02-11 PROCEDURE — 00C70ZZ EXTIRPATION OF MATTER FROM CEREBRAL HEMISPHERE, OPEN APPROACH: ICD-10-PCS | Performed by: NEUROLOGICAL SURGERY

## 2020-02-11 PROCEDURE — 82803 BLOOD GASES ANY COMBINATION: CPT

## 2020-02-11 PROCEDURE — 5A1945Z RESPIRATORY VENTILATION, 24-96 CONSECUTIVE HOURS: ICD-10-PCS | Performed by: NEUROLOGICAL SURGERY

## 2020-02-11 PROCEDURE — 86901 BLOOD TYPING SEROLOGIC RH(D): CPT | Performed by: STUDENT IN AN ORGANIZED HEALTH CARE EDUCATION/TRAINING PROGRAM

## 2020-02-11 PROCEDURE — 86900 BLOOD TYPING SEROLOGIC ABO: CPT | Performed by: INTERNAL MEDICINE

## 2020-02-11 PROCEDURE — 85014 HEMATOCRIT: CPT

## 2020-02-11 PROCEDURE — 82330 ASSAY OF CALCIUM: CPT

## 2020-02-11 PROCEDURE — 85025 COMPLETE CBC W/AUTO DIFF WBC: CPT | Performed by: INTERNAL MEDICINE

## 2020-02-11 PROCEDURE — 84295 ASSAY OF SERUM SODIUM: CPT

## 2020-02-11 PROCEDURE — 86850 RBC ANTIBODY SCREEN: CPT | Performed by: INTERNAL MEDICINE

## 2020-02-11 PROCEDURE — 84100 ASSAY OF PHOSPHORUS: CPT | Performed by: INTERNAL MEDICINE

## 2020-02-11 PROCEDURE — 00Q20ZZ REPAIR DURA MATER, OPEN APPROACH: ICD-10-PCS | Performed by: NEUROLOGICAL SURGERY

## 2020-02-11 DEVICE — DURA 62105 SUBSTITUTE DUREPAIR 3X3IN NCE
Type: IMPLANTABLE DEVICE | Site: CRANIAL | Status: FUNCTIONAL
Brand: DUREPAIR®

## 2020-02-11 RX ORDER — CEFAZOLIN SODIUM 1 G/50ML
1000 SOLUTION INTRAVENOUS EVERY 8 HOURS
Status: COMPLETED | OUTPATIENT
Start: 2020-02-11 | End: 2020-02-12

## 2020-02-11 RX ORDER — CEFAZOLIN SODIUM 2 G/50ML
SOLUTION INTRAVENOUS AS NEEDED
Status: DISCONTINUED | OUTPATIENT
Start: 2020-02-11 | End: 2020-02-11 | Stop reason: SURG

## 2020-02-11 RX ORDER — SUCCINYLCHOLINE/SOD CL,ISO/PF 100 MG/5ML
SYRINGE (ML) INTRAVENOUS AS NEEDED
Status: DISCONTINUED | OUTPATIENT
Start: 2020-02-11 | End: 2020-02-11 | Stop reason: SURG

## 2020-02-11 RX ORDER — FENTANYL CITRATE 50 UG/ML
INJECTION, SOLUTION INTRAMUSCULAR; INTRAVENOUS AS NEEDED
Status: DISCONTINUED | OUTPATIENT
Start: 2020-02-11 | End: 2020-02-11 | Stop reason: SURG

## 2020-02-11 RX ORDER — MANNITOL 250 MG/ML
INJECTION, SOLUTION INTRAVENOUS AS NEEDED
Status: DISCONTINUED | OUTPATIENT
Start: 2020-02-11 | End: 2020-02-11 | Stop reason: SURG

## 2020-02-11 RX ORDER — HEPARIN SODIUM 5000 [USP'U]/ML
5000 INJECTION, SOLUTION INTRAVENOUS; SUBCUTANEOUS EVERY 8 HOURS SCHEDULED
Status: DISCONTINUED | OUTPATIENT
Start: 2020-02-12 | End: 2020-02-11

## 2020-02-11 RX ORDER — LIDOCAINE HYDROCHLORIDE AND EPINEPHRINE 10; 10 MG/ML; UG/ML
INJECTION, SOLUTION INFILTRATION; PERINEURAL AS NEEDED
Status: DISCONTINUED | OUTPATIENT
Start: 2020-02-11 | End: 2020-02-11 | Stop reason: HOSPADM

## 2020-02-11 RX ORDER — ROCURONIUM BROMIDE 10 MG/ML
INJECTION, SOLUTION INTRAVENOUS AS NEEDED
Status: DISCONTINUED | OUTPATIENT
Start: 2020-02-11 | End: 2020-02-11 | Stop reason: SURG

## 2020-02-11 RX ORDER — ONDANSETRON 2 MG/ML
4 INJECTION INTRAMUSCULAR; INTRAVENOUS EVERY 4 HOURS PRN
Status: DISCONTINUED | OUTPATIENT
Start: 2020-02-11 | End: 2020-02-18 | Stop reason: HOSPADM

## 2020-02-11 RX ORDER — FENTANYL CITRATE 50 UG/ML
50 INJECTION, SOLUTION INTRAMUSCULAR; INTRAVENOUS
Status: DISCONTINUED | OUTPATIENT
Start: 2020-02-11 | End: 2020-02-14

## 2020-02-11 RX ORDER — DOCUSATE SODIUM 100 MG/1
100 CAPSULE, LIQUID FILLED ORAL 2 TIMES DAILY
Status: DISCONTINUED | OUTPATIENT
Start: 2020-02-11 | End: 2020-02-13

## 2020-02-11 RX ORDER — ALBUMIN, HUMAN INJ 5% 5 %
SOLUTION INTRAVENOUS CONTINUOUS PRN
Status: DISCONTINUED | OUTPATIENT
Start: 2020-02-11 | End: 2020-02-11 | Stop reason: SURG

## 2020-02-11 RX ORDER — BISACODYL 10 MG
10 SUPPOSITORY, RECTAL RECTAL DAILY PRN
Status: DISCONTINUED | OUTPATIENT
Start: 2020-02-11 | End: 2020-02-14

## 2020-02-11 RX ORDER — FENTANYL CITRATE-0.9 % NACL/PF 10 MCG/ML
25 PLASTIC BAG, INJECTION (ML) INTRAVENOUS CONTINUOUS
Status: DISCONTINUED | OUTPATIENT
Start: 2020-02-11 | End: 2020-02-12

## 2020-02-11 RX ORDER — FENTANYL CITRATE 50 UG/ML
INJECTION, SOLUTION INTRAMUSCULAR; INTRAVENOUS
Status: COMPLETED
Start: 2020-02-11 | End: 2020-02-11

## 2020-02-11 RX ORDER — SODIUM CHLORIDE 9 MG/ML
75 INJECTION, SOLUTION INTRAVENOUS CONTINUOUS
Status: DISCONTINUED | OUTPATIENT
Start: 2020-02-11 | End: 2020-02-12

## 2020-02-11 RX ORDER — PROPOFOL 10 MG/ML
INJECTION, EMULSION INTRAVENOUS CONTINUOUS PRN
Status: DISCONTINUED | OUTPATIENT
Start: 2020-02-11 | End: 2020-02-11 | Stop reason: SURG

## 2020-02-11 RX ORDER — CHLORHEXIDINE GLUCONATE 0.12 MG/ML
15 RINSE ORAL EVERY 12 HOURS SCHEDULED
Status: DISCONTINUED | OUTPATIENT
Start: 2020-02-11 | End: 2020-02-18 | Stop reason: HOSPADM

## 2020-02-11 RX ORDER — MAGNESIUM HYDROXIDE 1200 MG/15ML
LIQUID ORAL AS NEEDED
Status: DISCONTINUED | OUTPATIENT
Start: 2020-02-11 | End: 2020-02-11 | Stop reason: HOSPADM

## 2020-02-11 RX ORDER — FUROSEMIDE 10 MG/ML
INJECTION INTRAMUSCULAR; INTRAVENOUS AS NEEDED
Status: DISCONTINUED | OUTPATIENT
Start: 2020-02-11 | End: 2020-02-11 | Stop reason: SURG

## 2020-02-11 RX ORDER — FENTANYL CITRATE 50 UG/ML
50 INJECTION, SOLUTION INTRAMUSCULAR; INTRAVENOUS
Status: DISCONTINUED | OUTPATIENT
Start: 2020-02-11 | End: 2020-02-13

## 2020-02-11 RX ORDER — LABETALOL 20 MG/4 ML (5 MG/ML) INTRAVENOUS SYRINGE
10 EVERY 4 HOURS PRN
Status: DISCONTINUED | OUTPATIENT
Start: 2020-02-11 | End: 2020-02-18 | Stop reason: HOSPADM

## 2020-02-11 RX ORDER — PROPOFOL 10 MG/ML
INJECTION, EMULSION INTRAVENOUS AS NEEDED
Status: DISCONTINUED | OUTPATIENT
Start: 2020-02-11 | End: 2020-02-11 | Stop reason: SURG

## 2020-02-11 RX ORDER — PROPOFOL 10 MG/ML
5-50 INJECTION, EMULSION INTRAVENOUS
Status: DISCONTINUED | OUTPATIENT
Start: 2020-02-11 | End: 2020-02-12

## 2020-02-11 RX ORDER — DEXAMETHASONE SODIUM PHOSPHATE 10 MG/ML
INJECTION, SOLUTION INTRAMUSCULAR; INTRAVENOUS AS NEEDED
Status: DISCONTINUED | OUTPATIENT
Start: 2020-02-11 | End: 2020-02-11 | Stop reason: SURG

## 2020-02-11 RX ORDER — SODIUM CHLORIDE, SODIUM LACTATE, POTASSIUM CHLORIDE, CALCIUM CHLORIDE 600; 310; 30; 20 MG/100ML; MG/100ML; MG/100ML; MG/100ML
INJECTION, SOLUTION INTRAVENOUS CONTINUOUS PRN
Status: DISCONTINUED | OUTPATIENT
Start: 2020-02-11 | End: 2020-02-11 | Stop reason: SURG

## 2020-02-11 RX ORDER — SODIUM CHLORIDE 9 MG/ML
INJECTION, SOLUTION INTRAVENOUS CONTINUOUS PRN
Status: DISCONTINUED | OUTPATIENT
Start: 2020-02-11 | End: 2020-02-11 | Stop reason: SURG

## 2020-02-11 RX ADMIN — FENTANYL CITRATE 50 MCG: 50 INJECTION, SOLUTION INTRAMUSCULAR; INTRAVENOUS at 22:34

## 2020-02-11 RX ADMIN — SODIUM CHLORIDE: 0.9 INJECTION, SOLUTION INTRAVENOUS at 08:59

## 2020-02-11 RX ADMIN — LEVETIRACETAM 500 MG: 500 TABLET, FILM COATED ORAL at 22:12

## 2020-02-11 RX ADMIN — SODIUM CHLORIDE, SODIUM LACTATE, POTASSIUM CHLORIDE, AND CALCIUM CHLORIDE: .6; .31; .03; .02 INJECTION, SOLUTION INTRAVENOUS at 08:50

## 2020-02-11 RX ADMIN — MANNITOL 25 G: 12.5 INJECTION, SOLUTION INTRAVENOUS at 09:50

## 2020-02-11 RX ADMIN — FENTANYL CITRATE 50 MCG: 50 INJECTION, SOLUTION INTRAMUSCULAR; INTRAVENOUS at 17:38

## 2020-02-11 RX ADMIN — PHENYLEPHRINE HYDROCHLORIDE 50 MCG: 10 INJECTION INTRAVENOUS at 09:38

## 2020-02-11 RX ADMIN — FENTANYL CITRATE 25 MCG/HR: 50 INJECTION, SOLUTION INTRAMUSCULAR; INTRAVENOUS at 12:40

## 2020-02-11 RX ADMIN — CEFAZOLIN SODIUM 2000 MG: 2 SOLUTION INTRAVENOUS at 09:00

## 2020-02-11 RX ADMIN — FENTANYL CITRATE 50 MCG: 50 INJECTION, SOLUTION INTRAMUSCULAR; INTRAVENOUS at 09:05

## 2020-02-11 RX ADMIN — FENTANYL CITRATE 50 MCG: 50 INJECTION, SOLUTION INTRAMUSCULAR; INTRAVENOUS at 12:44

## 2020-02-11 RX ADMIN — ROCURONIUM BROMIDE 50 MG: 50 INJECTION, SOLUTION INTRAVENOUS at 10:50

## 2020-02-11 RX ADMIN — PROPOFOL 50 MCG/KG/MIN: 10 INJECTION, EMULSION INTRAVENOUS at 11:14

## 2020-02-11 RX ADMIN — PROPOFOL 150 MG: 10 INJECTION, EMULSION INTRAVENOUS at 08:57

## 2020-02-11 RX ADMIN — CEFAZOLIN SODIUM 1000 MG: 1 SOLUTION INTRAVENOUS at 17:38

## 2020-02-11 RX ADMIN — PROPOFOL 50 MCG/KG/MIN: 10 INJECTION, EMULSION INTRAVENOUS at 17:39

## 2020-02-11 RX ADMIN — NEOMYCIN AND POLYMYXIN B SULFATES: 40; 200000 SOLUTION IRRIGATION at 11:46

## 2020-02-11 RX ADMIN — DOCUSATE SODIUM 100 MG: 100 CAPSULE, LIQUID FILLED ORAL at 17:39

## 2020-02-11 RX ADMIN — ROCURONIUM BROMIDE 50 MG: 50 INJECTION, SOLUTION INTRAVENOUS at 09:05

## 2020-02-11 RX ADMIN — CHLORHEXIDINE GLUCONATE 0.12% ORAL RINSE 15 ML: 1.2 LIQUID ORAL at 22:11

## 2020-02-11 RX ADMIN — FENTANYL CITRATE 50 MCG: 50 INJECTION, SOLUTION INTRAMUSCULAR; INTRAVENOUS at 11:14

## 2020-02-11 RX ADMIN — PHENYLEPHRINE HYDROCHLORIDE 40 MCG/MIN: 10 INJECTION INTRAVENOUS at 09:43

## 2020-02-11 RX ADMIN — FUROSEMIDE 10 MG: 10 INJECTION, SOLUTION INTRAMUSCULAR; INTRAVENOUS at 09:50

## 2020-02-11 RX ADMIN — PROPOFOL 40 MCG/KG/MIN: 10 INJECTION, EMULSION INTRAVENOUS at 12:00

## 2020-02-11 RX ADMIN — ALBUMIN (HUMAN): 12.5 SOLUTION INTRAVENOUS at 10:14

## 2020-02-11 RX ADMIN — LEVETIRACETAM 500 MG: 500 TABLET, FILM COATED ORAL at 01:10

## 2020-02-11 RX ADMIN — PROPOFOL 20 MG: 10 INJECTION, EMULSION INTRAVENOUS at 10:50

## 2020-02-11 RX ADMIN — LEVETIRACETAM 1000 MG: 100 INJECTION, SOLUTION INTRAVENOUS at 09:22

## 2020-02-11 RX ADMIN — DEXMEDETOMIDINE 0.7 MCG/KG/HR: 100 INJECTION, SOLUTION, CONCENTRATE INTRAVENOUS at 20:26

## 2020-02-11 RX ADMIN — Medication 100 MG: at 08:57

## 2020-02-11 RX ADMIN — SODIUM CHLORIDE 8 MG/HR: 0.9 INJECTION, SOLUTION INTRAVENOUS at 03:59

## 2020-02-11 RX ADMIN — ALBUMIN (HUMAN): 12.5 SOLUTION INTRAVENOUS at 09:15

## 2020-02-11 RX ADMIN — ATORVASTATIN CALCIUM 40 MG: 40 TABLET, FILM COATED ORAL at 01:10

## 2020-02-11 RX ADMIN — PROPOFOL 50 MCG/KG/MIN: 10 INJECTION, EMULSION INTRAVENOUS at 14:20

## 2020-02-11 RX ADMIN — FENTANYL CITRATE 50 MCG: 50 INJECTION, SOLUTION INTRAMUSCULAR; INTRAVENOUS at 11:02

## 2020-02-11 RX ADMIN — PHENYLEPHRINE HYDROCHLORIDE 100 MCG: 10 INJECTION INTRAVENOUS at 08:57

## 2020-02-11 RX ADMIN — SODIUM CHLORIDE, SODIUM LACTATE, POTASSIUM CHLORIDE, AND CALCIUM CHLORIDE: .6; .31; .03; .02 INJECTION, SOLUTION INTRAVENOUS at 11:21

## 2020-02-11 RX ADMIN — ATORVASTATIN CALCIUM 40 MG: 40 TABLET, FILM COATED ORAL at 17:39

## 2020-02-11 RX ADMIN — SODIUM CHLORIDE 75 ML/HR: 0.9 INJECTION, SOLUTION INTRAVENOUS at 12:30

## 2020-02-11 RX ADMIN — FENTANYL CITRATE 50 MCG: 50 INJECTION, SOLUTION INTRAMUSCULAR; INTRAVENOUS at 08:57

## 2020-02-11 RX ADMIN — DEXAMETHASONE SODIUM PHOSPHATE 10 MG: 10 INJECTION, SOLUTION INTRAMUSCULAR; INTRAVENOUS at 09:11

## 2020-02-11 NOTE — OP NOTE
OPERATIVE REPORT  PATIENT NAME: Isai Verde    :  1940  MRN: [id number]  Pt Location: BE OR ROOM 09    SURGERY DATE: 2020    Surgeon(s) and Role:     * Roseann Rm MD - Primary    Preop Diagnosis:  * No pre-op diagnosis entered *    * No Diagnosis Codes entered *    Procedure(s) (LRB):  Right decompressive craniectomy and evacuation of intraparenchymal hematoma (Right)    Specimen(s):  * No specimens in log *    Estimated Blood Loss:   150 mL    Drains:  Closed/Suction Drain Right Head Bulb (Active)   Number of days: 0       Urethral Catheter Non-latex; Temperature probe 14 Fr  (Active)   Number of days: 0       Ventriculostomy/Subdural Subdural drainage catheter Right Temporal region (Active)   Drain Status Capped 2/10/2020 12:00 AM   Number of days: 0       ICP Device ICP microsensor fiber Right Frontal region (Active)   Number of days: 0       Anesthesia Type:   General endotracheal    Operative Indications:  Intraparenchymal hematoma with compression of underlying brain    Operative Findings:  Intraparenchymal hematoma in the temporal lobe    Complications:   None    Procedure and Technique:  After adequate general endotracheal anesthesia the patient was placed supine on the operating table  A bolster was placed under the right shoulder and the head was turned to the left  The scalp was clipped free of hair and the scalp was prepped with Betadine soap then DuraPrep  Double layer drapes were placed in normal fashion and a 3M Betadine impregnated sticky drape was placed over these  A time-out was called and all parameters a time-out were followed  The skin in the right frontal parietal and temporal region was injected with 1% lidocaine with 1 100,000 epinephrine  A 15  Blade was used to incise the skin  Bovie and bipolar were used throughout the procedure to maintain hemostasis  Bovie and a cutting setting was used divide the tissues to the underlying para cranium    Tiana clips were placed on the scalp edges  The temporalis muscle was elevated from the underlying bone with the use of the Bovie  Two 0 silk sutures and double rubber bands were used to maintain operative exposure  Surgicel was placed on the exposed temporalis muscle and scalp flap  Several bur holes were made along the periphery of the incision and a full craniotomy was performed  The dura was not breached this maneuver  The bone was then passed off in the bone preservation protocol  The dura was opened in a curvilinear fashion  He underlying brain was very tight  The temporal lobe was beginning to herniate and obvious staining of the overlying jamarcus was noted  Our the temporal lobe hematoma was immediately evacuated and the brain decompressed very nicely  Copious quantities of irrigation were used to cleanse out the region  Bipolar was used to maintain hemostasis  Hydrogen peroxide soaked cotton balls were utilized to secure bleeding points in the hematoma cavity  Next a 1 9 mm back to seal catheter was placed in the subdural space over the temporal lobe hemorrhage  Surgicel was used to line the resection cavity after had been thoroughly irrigated following the Hydrea peroxide  A duraplasty was performed with dura repair  The dura was then loosely approximated over this period a Addepar intracranial post craniotomy intracranial pressure monitor was then calibrated and placed into position  The calibration constant was 499  A 10 mm flat Jas-Oneill drain was then placed in the subgaleal space  The temporalis muscle was loosely approximated with interrupted inverted to 0 Vicryl suture  The galea was closed with interrupted inverted to 0 Vicryl suture  The skin was approximated with staples  Clean sterile dressings were placed  The patient was taken to the intensive care unit intubated     I was present for the entire procedure    Patient Disposition:  Critical Care Unit and hemodynamically stable    SIGNATURE: Justus Rodriguez MD  DATE: February 11, 2020  TIME: 11:51 AM

## 2020-02-11 NOTE — SPEECH THERAPY NOTE
Speech Language/Pathology  Orders received, chart reviewed  Pt underwent emergent craniotomy this morning, returned to the unit still requiring intubation  Please re consult when appropriate

## 2020-02-11 NOTE — CONSULTS
Consultation - Neurology   Kathi Quinn [de-identified] y o  female MRN: [id number]  Unit/Bed#: ICU 02 Encounter: [id number]    Assessment/Plan   78-year-old female with past medical history as listed below, whom presented to [address] [place] [address] with altered mental status and headache with reported nausea and vomiting  CT of the head revealed a right parietal/temporal ICH with mass effect, CTA of the head and neck was completed which showed no vascular abnormality  ICH score was documented to be a 1  The patient was transferred to [facility] for further ICU care and neurosurgical evaluation  Repeat CT of the head was completed today - large acute intraparenchymal hemorrhage involving the right temporal lobe with mass effect on the right lateral ventricle and minimal to mild right to left midline shift, the size of the intraparenchymal hematoma was minimally increased from prior exam     Right temporal ICH with evidence of brain compression and mass effect, status post right decompressive craniectomy and evacuation of intraparenchymal hematoma on the right     Hypertension  CHF  Obstructive sleep apnea on BiPAP  GERD  Hypothyroid    -  No anticoagulation or antiplatelet medications   -  ICU following closely and managing medical issues, please monitor for infectious and metabolic derangements  -  Neurosurgery following - status post, right decompressive craniectomy and evacuation of intraparenchymal hematoma  -  MRI of brain with and with out contrast, MRV - when stable per ICU team  -  Repeat CT of head per ICU team and neurosurgery team    -  Frequent neurological check notify neurology with any change in neurological condition  -  STAT CT of head with change in neurological examination   -  Reviewed with family at bedside plan of care  -  Attending to see and advise    History of Present Illness     Reason for Consult / Principal Problem: ICH    HPI: Kathi Quinn is a [de-identified] y o   female with past medical history as listed below, per record review the patient does have a history of hypertension, sleep apnea, heart failure, GERD, arthritis and anxiety  She does follow with sleep medicine as out patient, for severe sleep apnea, as well as sleep maintenance insomnia  Per record review, the the patient presented to [address] [place] [address] after having altered mental status and headache with reported nausea/ vomiting  It is documented the patient lives at home with her son and daughter-in-law, she was normal state of health in the morning, however, in the evening yesterday the patient seemed to be off for which the family were concerned and brought her to the hospital      CT of the head in the ED revealed an acute intracerebral hemorrhage in the right temporal lobe with mild mass effect but no midline shift or herniation, probable small chronic right frontal subdural collection 2 mm in thickness  CTA of head and neck read identified large acute right temporal intraparenchymal hemorrhage with mass effect on the right lateral ventricle system and minimal to mild right to left midline shift, there was no focal stenosis or saccular aneurysm within the [place], there was no hemodynamic significant stenosis with either common or internal carotid artery, less than 50% stenosis by NASCET criteria  The patient was transferred to [facility] ICU team, for further supportive care, per review she did receive 25 g mannitol  Neurosurgery and neurology was asked to evaluate the patient  The patient was seen by the neurosurgery team, prior to neurology evaluating patient and patient was evaluated by neurosurgery - it was recommended the patient undergo urgent right decompressive craniectomy and evacuation of hematoma  Neurology team evaluated patient s/p right decompressive craniectomy and evacuation of intraparenchymal hematoma, family at bedside    Patient is on sedation - hx from chart and family, family endorses events as described above, no prior history neurological problems, no history of stroke or prior intracranial bleed, no prior history of seizures  The patient has history of hypertension  ROS unable to be performed  Inpatient consult to Neurology  Consult performed by: Grisel Gregory PA-C  Consult ordered by: Nirav Lindsey DO          Review of Systems; unable intubated and sedated  Historical Information   Past Medical History:   Diagnosis Date    Anxiety     Arthritis     joints    Cataract     Disease of thyroid gland     Eczema     GERD (gastroesophageal reflux disease)     Gout     Heart failure ([address] )     Hepatitis     Hiatal hernia     Hypertension     Onychomycosis     last assessed: 16    Orthopnea     resolved: 16    Sleep apnea     wears CPAP     Past Surgical History:   Procedure Laterality Date    ABDOMINAL SURGERY          BREAST SURGERY Right     cyst removal    CARPAL TUNNEL RELEASE Left     CARPAL TUNNEL RELEASE Right     CATARACT EXTRACTION       SECTION  1960    x1    COLONOSCOPY N/A 2018    Procedure: COLONOSCOPY;  Surgeon: Lb Em MD;  Location: [facility] GI LAB; Service: Gastroenterology    DILATION AND CURETTAGE OF UTERUS  1967    EYE SURGERY Left 2006    cataracts    HERNIA REPAIR      umbilical hernia    INCISIONAL HERNIA REPAIR      incarcerated    KNEE ARTHROSCOPY Right     AK XCAPSL CTRC RMVL INSJ IO LENS PROSTH W/O ECP Right 3/27/2017    Procedure: EXTRACTION EXTRACAPSULAR CATARACT PHACO INTRAOCULAR LENS (IOL);   Surgeon: Amilcar Corbin MD;  Location[facility] MAIN OR;  Service: Ophthalmology     Social History   Social History     Substance and Sexual Activity   Alcohol Use Never    Alcohol/week: 0 0 standard drinks    Frequency: Never    Drinks per session: Patient refused    Binge frequency: Never    Comment: 0     Social History     Substance and Sexual Activity   Drug Use Never Social History     Tobacco Use   Smoking Status Never Smoker   Smokeless Tobacco Never Used     Family History:   Family History   Problem Relation Age of Onset    Diabetes Mother     Coronary artery disease Mother     Arthritis Mother     Hypertension Mother     Hypertension Father     Diabetes Brother     Diabetes Son     Arthritis Family      Review of previous medical records was completed, no prior neurological encounters, did follow with sleep medicine in 2016 - reviewed notes  Meds/Allergies   all current active meds have been reviewed, current meds:   Current Facility-Administered Medications   Medication Dose Route Frequency    atorvastatin (LIPITOR) tablet 40 mg  40 mg Oral QPM    levETIRAcetam (KEPPRA) tablet 500 mg  500 mg Oral Q12H [address]    levothyroxine tablet 112 mcg  112 mcg Oral Early Morning    niCARdipine (CARDENE) 25 mg (STANDARD CONCENTRATION) in sodium chloride 0 9% 250 mL  1-15 mg/hr Intravenous Titrated    pantoprazole (PROTONIX) EC tablet 40 mg  40 mg Oral Early Morning    and PTA meds:   Prior to Admission Medications   Prescriptions Last Dose Informant Patient Reported? Taking?    Omega-3-acid Ethyl Esters & D3 1 & 1000 GM & UNIT KIT  Self Yes No   Sig: Take 2 capsules by mouth 2 (two) times a day   allopurinol (ZYLOPRIM) 300 mg tablet   No No   Sig: Take 1 tablet (300 mg total) by mouth daily   aspirin 81 MG tablet  Self Yes No   Sig: Take 81 mg by mouth every morning     bumetanide (BUMEX) 2 mg tablet  Self No No   Sig: Take 2 mg daily in AM except on M-W-F take 2mg twice a day   cholecalciferol (VITAMIN D3) 1,000 units tablet  Self Yes No   Sig: Take 1 tablet by mouth daily   clotrimazole-betamethasone (LOTRISONE) 1-0 05 % cream   No No   Sig: Apply topically 2 (two) times a day for 25 days   ferrous sulfate 324 (65 Fe) mg  Self No No   Sig: Take 1 tablet (324 mg total) by mouth daily before breakfast   gabapentin (NEURONTIN) 300 mg capsule  Self No No   Sig: Take 1 capsule (300 mg total) by mouth daily at bedtime   halobetasol (ULTRAVATE) 0 05 % cream  Self Yes No   levothyroxine 112 mcg tablet   No No   Sig: Take 1 tablet (112 mcg total) by mouth daily   losartan (COZAAR) 25 mg tablet   No No   Sig: Take 1 tablet (25 mg total) by mouth 2 (two) times a day   omeprazole (PriLOSEC) 20 mg delayed release capsule   No No   Sig: Take 1 capsule (20 mg total) by mouth every morning   propranolol (INDERAL) 80 mg tablet  Self No No   Sig: TAKE 1/2 TABLET EVERY 12 HOURS DAILY      Facility-Administered Medications: None       No Known Allergies    Objective   Vitals:Blood pressure (!) 126/41, pulse 84, temperature 98 6 °F (37 °C), temperature source Oral, resp  rate 17, height 5' 1" (1 549 m), weight 94 8 kg (208 lb 15 9 oz), SpO2 97 %, not currently breastfeeding  ,Body mass index is 39 49 kg/m²  Intake/Output Summary (Last 24 hours) at 2/11/2020 0708  Last data filed at 2/11/2020 0501  Gross per 24 hour   Intake 321 84 ml   Output 1000 ml   Net -678 16 ml       Invasive Devices: Invasive Devices     Peripheral Intravenous Line            Peripheral IV 02/11/20 Distal;Left;Ventral (anterior) Wrist less than 1 day    Peripheral IV 02/11/20 Left Antecubital less than 1 day    Peripheral IV 02/11/20 Left;Ventral (anterior) Forearm less than 1 day              Examined on sedation - postoperatively  Physical Exam   Neurological: Finger-nose-finger test: unable to assess critically ill  Neurologic Exam     Mental Status   On sedation, not opening eyes, not following commands, not tracking to examiner, on sedation status post surgery  Cranial Nerves   Pupils are equal and minimally reactive  Eyes midline, no eye preference, no roving eye movements seen     Difficult to assess facial droop with patient intubated  No blink to threat bilaterally     Motor Exam Observed moving uppers and lowers, uppers in restraints, it appears she is moving her right side, more than her left side      Decreased withdrawal on left compared to the right, but withdrew to stimuli  Sensory Exam   No withdrawal noted in uppers   Lowers minimal withdrawal right greater than left  Gait, Coordination, and Reflexes     Gait  Gait: (unable to assess critically ill patient)    Coordination   Finger-nose-finger test: unable to assess critically ill      Reflexes   Right plantar: equivocal  Left plantar: equivocal    Lab Results:     Recent Results (from the past 24 hour(s))   CBC and differential    Collection Time: 02/10/20  8:05 PM   Result Value Ref Range    WBC 11 26 (H) 4 31 - 10 16 Thousand/uL    RBC 4 70 3 81 - 5 12 Million/uL    Hemoglobin 13 1 11 5 - 15 4 g/dL    Hematocrit 41 2 34 8 - 46 1 %    MCV 88 82 - 98 fL    MCH 27 9 26 8 - 34 3 pg    MCHC 31 8 31 4 - 37 4 g/dL    RDW 15 5 (H) 11 6 - 15 1 %    MPV 11 3 8 9 - 12 7 fL    Platelets 807 924 - 637 Thousands/uL    nRBC 0 /100 WBCs    Neutrophils Relative 88 (H) 43 - 75 %    Immat GRANS % 1 0 - 2 %    Lymphocytes Relative 7 (L) 14 - 44 %    Monocytes Relative 4 4 - 12 %    Eosinophils Relative 0 0 - 6 %    Basophils Relative 0 0 - 1 %    Neutrophils Absolute 9 84 (H) 1 85 - 7 62 Thousands/µL    Immature Grans Absolute 0 08 0 00 - 0 20 Thousand/uL    Lymphocytes Absolute 0 81 0 60 - 4 47 Thousands/µL    Monocytes Absolute 0 49 0 17 - 1 22 Thousand/µL    Eosinophils Absolute 0 02 0 00 - 0 61 Thousand/µL    Basophils Absolute 0 02 0 00 - 0 10 Thousands/µL   Comprehensive metabolic panel    Collection Time: 02/10/20  8:05 PM   Result Value Ref Range    Sodium 138 136 - 145 mmol/L    Potassium 4 9 3 5 - 5 3 mmol/L    Chloride 102 100 - 108 mmol/L    CO2 26 21 - 32 mmol/L    ANION GAP 10 4 - 13 mmol/L    BUN 24 5 - 25 mg/dL    Creatinine 1 05 0 60 - 1 30 mg/dL    Glucose 118 65 - 140 mg/dL    Calcium 9 3 8 3 - 10 1 mg/dL    AST 41 5 - 45 U/L    ALT 26 12 - 78 U/L    Alkaline Phosphatase 153 (H) 46 - 116 U/L    Total Protein 7 9 6 4 - 8 2 g/dL    Albumin 3 2 (L) 3 5 - 5 0 g/dL    Total Bilirubin 0 68 0 20 - 1 00 mg/dL    eGFR 50 ml/min/1 73sq m   Troponin I    Collection Time: 02/10/20  8:05 PM   Result Value Ref Range    Troponin I 0 05 (H) <=0 04 ng/mL   Protime-INR    Collection Time: 02/10/20  8:43 PM   Result Value Ref Range    Protime 13 9 11 6 - 14 5 seconds    INR 1 13 0 84 - 1 19   APTT    Collection Time: 02/10/20  8:43 PM   Result Value Ref Range    PTT 31 23 - 37 seconds   Magnesium    Collection Time: 02/11/20  6:04 AM   Result Value Ref Range    Magnesium 2 0 1 6 - 2 6 mg/dL   Phosphorus    Collection Time: 02/11/20  6:04 AM   Result Value Ref Range    Phosphorus 4 0 2 3 - 4 1 mg/dL   Lipid Panel with Direct LDL reflex    Collection Time: 02/11/20  6:04 AM   Result Value Ref Range    Cholesterol 177 50 - 200 mg/dL    Triglycerides 146 <=150 mg/dL    HDL, Direct 45 >=40 mg/dL    LDL Calculated 103 (H) 0 - 100 mg/dL     Per radiology interpretation -  "  Procedure: Cta Head And Neck With And Without Contrast    Result Date: 2/10/2020  Narrative: CTA NECK AND BRAIN WITH CONTRAST INDICATION: Neuro deficit, acute, stroke suspected acute intracranial hemorrhage  COMPARISON:   CT brain unenhanced performed approximately 1 hour prior  TECHNIQUE: Post contrast imaging was performed after administration of iodinated contrast through the neck and brain  Post contrast axial 0 625 mm images timed to opacify the arterial system  3D rendering was performed on an independent workstation  MIP reconstructions performed  Coronal reconstructions were performed of the noncontrast portion of the brain  Radiation dose length product (DLP) for this visit:  448 mGy-cm   This examination, like all CT scans performed in the [facility], was performed utilizing techniques to minimize radiation dose exposure, including the use of iterative reconstruction and automated exposure control     IV Contrast:  85 mL of iohexol (OMNIPAQUE)  IMAGE QUALITY:   Diagnostic FINDINGS: Again noted is a large area of acute intracranial hemorrhage involving the right temporal lobe with mass effect on the right lateral ventricular system  There is approximately 5 mm of left-to-right midline shift, stable  CERVICAL VASCULATURE AORTIC ARCH AND GREAT VESSELS:  Normal aortic arch and great vessel origins  Normal visualized subclavian vessels  RIGHT VERTEBRAL ARTERY CERVICAL SEGMENT:  Normal origin  The vessel is small in caliber throughout the neck  LEFT VERTEBRAL ARTERY CERVICAL SEGMENT:  Normal origin  The vessel is normal in caliber throughout the neck  RIGHT EXTRACRANIAL CAROTID SEGMENT:  Mild atherosclerotic disease of the distal common carotid artery and proximal cervical internal carotid artery without significant stenosis compared to the more distal ICA  LEFT EXTRACRANIAL CAROTID SEGMENT:  Mild atherosclerotic disease of the distal common carotid artery and proximal cervical internal carotid artery without significant stenosis compared to the more distal ICA  NASCET criteria was used to determine the degree of internal carotid artery diameter stenosis  INTRACRANIAL VASCULATURE INTERNAL CAROTID ARTERIES:  Normal enhancement of the intracranial portions of the internal carotid arteries  There is atherosclerotic calcification of the bilateral cavernous internal carotid arteries  Normal ophthalmic artery origins  Normal ICA terminus  ANTERIOR CIRCULATION:  Symmetric A1 segments and anterior cerebral arteries with normal enhancement  Normal anterior communicating artery  MIDDLE CEREBRAL ARTERY CIRCULATION:  M1 segment and middle cerebral artery branches demonstrate normal enhancement bilaterally  DISTAL VERTEBRAL ARTERIES:  There is a dominant distal left vertebral artery  Posterior inferior cerebellar artery origins are normal  Normal vertebral basilar junction  BASILAR ARTERY:  Basilar artery is normal in caliber  Normal superior cerebellar arteries   POSTERIOR CEREBRAL ARTERIES: Both posterior cerebral arteries arises from the basilar tip  Both arteries demonstrate normal enhancement  Normal posterior communicating arteries  DURAL VENOUS SINUSES:  Normal  NON VASCULAR ANATOMY BONY STRUCTURES:  No acute osseous abnormality  SOFT TISSUES OF THE NECK:  Normal  THORACIC INLET:  There is a partially visualized left pleural effusion  There is interstitial prominence noted at both lung apices with scattered areas of groundglass density suspicious for mild pulmonary vascular congestion  Impression: Reidentified large acute right temporal intraparenchymal hemorrhage with mass effect on the right lateral ventricular system and minimal to mild right to left midline shift  No focal stenosis or saccular aneurysm within the [place]  No hemodynamically significant stenosis within either common or internal carotid artery  Less than 50% stenosis by NASCET criteria  Partially visualized left pleural effusion  Workstation performed: [id number]     Procedure: Ct Head Wo Contrast    Result Date: 2/11/2020  Narrative: CT BRAIN - WITHOUT CONTRAST INDICATION:   Stroke, follow up  COMPARISON:  CT brain dated February 10, 2020 at 8:17 PM  TECHNIQUE:  CT examination of the brain was performed  In addition to axial images, coronal 2D reformatted images were created and submitted for interpretation  Radiation dose length product (DLP) for this visit:  1724 71 mGy-cm   This examination, like all CT scans performed in the [facility], was performed utilizing techniques to minimize radiation dose exposure, including the use of iterative reconstruction and automated exposure control  IMAGE QUALITY:  Diagnostic  FINDINGS: PARENCHYMA:  Again noted is acute intraparenchymal hemorrhage involving the right temporal lobe with surrounding vasogenic edema and mass effect on the right lateral ventricular system    The size of this hematoma measures approximately 7 5 x 4 0 x 2 3 cm  (AP, transverse, cc), minimally increased in size from the prior exam   There is approximately 4 mm of right-to-left midline shift, stable from the prior exam  VENTRICLES AND EXTRA-AXIAL SPACES:  See above  No hydrocephalus  VISUALIZED ORBITS AND PARANASAL SINUSES:  Unremarkable  CALVARIUM AND EXTRACRANIAL SOFT TISSUES:  Normal      Impression: Reidentified large acute intraparenchymal hemorrhage involving the right temporal lobe with mass effect on the right lateral ventricle and minimal to mild right to left midline shift  The size of this intraparenchymal hematoma has minimally increased from the prior exam  primarily in the AP dimension  Workstation performed: [id number]     Procedure: Ct Head Without Contrast    Result Date: 2/10/2020  Narrative: CT BRAIN - WITHOUT CONTRAST INDICATION:   Headache, acute, normal neuro exam   62-year-old female with elevated blood pressure, headache, nausea and vomiting  COMPARISON:  None  TECHNIQUE:  CT examination of the brain was performed  In addition to axial images, coronal 2D reformatted images were created and submitted for interpretation  Radiation dose length product (DLP) for this visit:  816 mGy-cm   This examination, like all CT scans performed in the [place], was performed utilizing techniques to minimize radiation dose exposure, including the use of iterative reconstruction and automated exposure control  IMAGE QUALITY:  Diagnostic  FINDINGS: PARENCHYMA:  No intracranial mass  No CT signs of acute infarction  6 3 x 3 5 x 2 5 cm acute hemorrhage in the right temporal lobe with surrounding edema, effacement of the overlying cortical sulci and of the right sylvian fissure  There is mild mass effect and compression of the right lateral ventricle  However, there is no midline shift and no subfalcine or uncal herniation  Hemorrhage volume approximately 29 mL   VENTRICLES AND EXTRA-AXIAL SPACES:  Narrowing of the right lateral ventricle, as mentioned above  Left lateral ventricle and 3rd and 4th ventricles unremarkable and unchanged from the CT from 2016  Mild effacement of the right side of the perimesencephalic cistern  Basilar cisterns otherwise unremarkable  Suggestion of small chronic right frontal subdural collection, 2 mm in maximal thickness with no mass effect (series 2, image 21; series 400, images 29-37)  VISUALIZED ORBITS AND PARANASAL SINUSES:  Unremarkable  CALVARIUM AND EXTRACRANIAL SOFT TISSUES:  Normal      Impression: 1   6 3 x 3 5 x 2 5 cm acute intracerebral hemorrhage in the right temporal lobe with mild mass effect but no midline shift or herniation  2   Probable small chronic right frontal subdural collection, 2 mm in thickness  I personally discussed this study with Beth Allen on 2/10/2020 at 8:30 PM  Workstation performed: [id number]   "    Imaging Studies: I have personally reviewed pertinent reports  Code Status: Level 3 - DNAR and DNI    Unable to examine patient initially, the patient was taken to the OR by neurosurgery

## 2020-02-11 NOTE — EMTALA/ACUTE CARE TRANSFER
[address] [place] [address]  Dept: [phone]      EMTALA TRANSFER CONSENT    NAME Miller Darden                                         1940                              MRN [id number]    I have been informed of my rights regarding examination, treatment, and transfer   by Dr Delia Watts DO    Benefits:      Risks:        Consent for Transfer:  I acknowledge that my medical condition has been evaluated and explained to me by the emergency department physician or other qualified medical person and/or my attending physician, who has recommended that I be transferred to the service of    at    The above potential benefits of such transfer, the potential risks associated with such transfer, and the probable risks of not being transferred have been explained to me, and I fully understand them  The doctor has explained that, in my case, the benefits of transfer outweigh the risks  I agree to be transferred  I authorize the performance of emergency medical procedures and treatments upon me in both transit and upon arrival at the receiving facility  Additionally, I authorize the release of any and all medical records to the receiving facility and request they be transported with me, if possible  I understand that the safest mode of transportation during a medical emergency is an ambulance and that the Hospital advocates the use of this mode of transport  Risks of traveling to the receiving facility by car, including absence of medical control, life sustaining equipment, such as oxygen, and medical personnel has been explained to me and I fully understand them  (MARY CORRECT BOX BELOW)  [  ]  I consent to the stated transfer and to be transported by ambulance/helicopter  [  ]  I consent to the stated transfer, but refuse transportation by ambulance and accept full responsibility for my transportation by car    I understand the risks of non-ambulance transfers and I exonerate the Hospital and its staff from any deterioration in my condition that results from this refusal     X___________________________________________    DATE  02/10/20  TIME________  Signature of patient or legally responsible individual signing on patient behalf           RELATIONSHIP TO PATIENT_________________________          Provider Certification    NAME Bryon [facility] 1940                              MRN [id number]    A medical screening exam was performed on the above named patient  Based on the examination:    Condition Necessitating Transfer The encounter diagnosis was Intracranial hemorrhage ([address] )  Patient Condition:      Reason for Transfer:      Transfer Requirements: Facility     · Space available and qualified personnel available for treatment as acknowledged by    · Agreed to accept transfer and to provide appropriate medical treatment as acknowledged by          · Appropriate medical records of the examination and treatment of the patient are provided at the time of transfer   [address], [address] _______  · Transfer will be performed by qualified personnel from    and appropriate transfer equipment as required, including the use of necessary and appropriate life support measures      Provider Certification: I have examined the patient and explained the following risks and benefits of being transferred/refusing transfer to the patient/family:         Based on these reasonable risks and benefits to the patient and/or the unborn child(misti), and based upon the information available at the time of the patients examination, I certify that the medical benefits reasonably to be expected from the provision of appropriate medical treatments at another medical facility outweigh the increasing risks, if any, to the individuals medical condition, and in the case of labor to the unborn child, from effecting the transfer      X____________________________________________ DATE 02/10/20        TIME_______      ORIGINAL - SEND TO MEDICAL RECORDS   COPY - SEND WITH PATIENT DURING TRANSFER

## 2020-02-11 NOTE — ED PROVIDER NOTES
History  Chief Complaint   Patient presents with    Hypertension     Pt reports elevated Bp (184/70), headache, and nausea  Reports onset last night  42-year-old female brought in for hypertension headache and nausea  Patient's family reports that her blood pressure has been difficult to control lately  Patient states she started with a headache last evening and then had some nausea and 1 episode of vomiting  Patient's family reports that when they came home tonight the patient was confused  They took her blood pressure was elevated and then she vomited  Brought to the emergency department for further evaluation  Patient denies any fever chills chest pain or shortness of breath remains nauseous and still complains of a headache  History provided by:  Patient and relative   used: No    Hypertension   Severity:  Severe  Onset quality:  Gradual  Timing:  Constant  Progression:  Worsening  Chronicity:  Chronic  Context: medication change    Associated symptoms: confusion, headaches and vomiting    Associated symptoms: no abdominal pain, no blurred vision, no chest pain, no ear pain, no epistaxis, no fatigue, no fever, no hematuria and no shortness of breath    Risk factors: no alcohol use and no prior aneurysm        Prior to Admission Medications   Prescriptions Last Dose Informant Patient Reported? Taking?    Omega-3-acid Ethyl Esters & D3 1 & 1000 GM & UNIT KIT  Self Yes No   Sig: Take 2 capsules by mouth 2 (two) times a day   allopurinol (ZYLOPRIM) 300 mg tablet   No No   Sig: Take 1 tablet (300 mg total) by mouth daily   aspirin 81 MG tablet  Self Yes No   Sig: Take 81 mg by mouth every morning     bumetanide (BUMEX) 2 mg tablet  Self No No   Sig: Take 2 mg daily in AM except on M-W-F take 2mg twice a day   cholecalciferol (VITAMIN D3) 1,000 units tablet  Self Yes No   Sig: Take 1 tablet by mouth daily   clotrimazole-betamethasone (LOTRISONE) 1-0 05 % cream   No No   Sig: Apply topically 2 (two) times a day for 25 days   ferrous sulfate 324 (65 Fe) mg  Self No No   Sig: Take 1 tablet (324 mg total) by mouth daily before breakfast   gabapentin (NEURONTIN) 300 mg capsule  Self No No   Sig: Take 1 capsule (300 mg total) by mouth daily at bedtime   halobetasol (ULTRAVATE) 0 05 % cream  Self Yes No   levothyroxine 112 mcg tablet   No No   Sig: Take 1 tablet (112 mcg total) by mouth daily   losartan (COZAAR) 25 mg tablet   No No   Sig: Take 1 tablet (25 mg total) by mouth 2 (two) times a day   omeprazole (PriLOSEC) 20 mg delayed release capsule   No No   Sig: Take 1 capsule (20 mg total) by mouth every morning   propranolol (INDERAL) 80 mg tablet  Self No No   Sig: TAKE 1/2 TABLET EVERY 12 HOURS DAILY      Facility-Administered Medications: None       Past Medical History:   Diagnosis Date    Anxiety     Arthritis     joints    Cataract     Disease of thyroid gland     Eczema     GERD (gastroesophageal reflux disease)     Gout     Heart failure (HCC)     Hepatitis     Hiatal hernia     Hypertension     Onychomycosis     last assessed: 16    Orthopnea     resolved: 16    Sleep apnea     wears CPAP       Past Surgical History:   Procedure Laterality Date    ABDOMINAL SURGERY          BREAST SURGERY Right     cyst removal    CARPAL TUNNEL RELEASE Left     CARPAL TUNNEL RELEASE Right     CATARACT EXTRACTION       SECTION  1960    x1    COLONOSCOPY N/A 2018    Procedure: COLONOSCOPY;  Surgeon: Juvenal Barrientos MD;  [facility]; Service: Gastroenterology    DILATION AND CURETTAGE OF UTERUS  1967    EYE SURGERY Left 2006    cataracts    HERNIA REPAIR      umbilical hernia    INCISIONAL HERNIA REPAIR      incarcerated    KNEE ARTHROSCOPY Right     NM XCAPSL CTRC RMVL INSJ IO LENS PROSTH W/O ECP Right 3/27/2017    Procedure: EXTRACTION EXTRACAPSULAR CATARACT PHACO INTRAOCULAR LENS (IOL);   Surgeon: Chante Hill MD;  Location: [facility] [place] [facility] OR;  Service: Ophthalmology       Family History   Problem Relation Age of Onset    Diabetes Mother     Coronary artery disease Mother     Arthritis Mother     Hypertension Mother     Hypertension Father     Diabetes Brother     Diabetes Son     Arthritis Family      I have reviewed and agree with the history as documented  Social History     Tobacco Use    Smoking status: Never Smoker    Smokeless tobacco: Never Used   Substance Use Topics    Alcohol use: No    Drug use: No        Review of Systems   Constitutional: Negative for fatigue and fever  HENT: Negative for congestion, ear pain and nosebleeds  Eyes: Negative for blurred vision, discharge and redness  Respiratory: Negative for apnea, cough, shortness of breath and wheezing  Cardiovascular: Negative for chest pain  Gastrointestinal: Positive for vomiting  Negative for abdominal pain and diarrhea  Endocrine: Negative for cold intolerance and polydipsia  Genitourinary: Negative for difficulty urinating and hematuria  Musculoskeletal: Negative for arthralgias and back pain  Skin: Negative for color change and rash  Allergic/Immunologic: Negative for environmental allergies and immunocompromised state  Neurological: Positive for headaches  Negative for numbness  Hematological: Negative for adenopathy  Does not bruise/bleed easily  Psychiatric/Behavioral: Positive for confusion  Negative for agitation and behavioral problems  Physical Exam  Physical Exam   Constitutional: She is oriented to person, place, and time  Vital signs are normal  She appears well-developed and well-nourished  Non-toxic appearance  She appears distressed  HENT:   Head: Normocephalic and atraumatic  Right Ear: Tympanic membrane and external ear normal    Left Ear: Tympanic membrane and external ear normal    Nose: Nose normal  No rhinorrhea, sinus tenderness or nasal deformity     Mouth/Throat: Uvula is midline and oropharynx is clear and moist  Normal dentition  Eyes: Pupils are equal, round, and reactive to light  Conjunctivae, EOM and lids are normal  Right eye exhibits no discharge  Left eye exhibits no discharge  Neck: Trachea normal and normal range of motion  Neck supple  No JVD present  Carotid bruit is not present  Cardiovascular: Normal rate, regular rhythm, intact distal pulses and normal pulses  No extrasystoles are present  PMI is not displaced  Pulmonary/Chest: Effort normal and breath sounds normal  No accessory muscle usage  No respiratory distress  She has no wheezes  She has no rhonchi  She has no rales  Abdominal: Soft  Normal appearance and bowel sounds are normal  She exhibits no mass  There is no tenderness  There is no rigidity, no rebound and no guarding  Musculoskeletal:        Right shoulder: She exhibits normal range of motion, no bony tenderness, no swelling and no deformity  Cervical back: Normal  She exhibits normal range of motion, no tenderness, no bony tenderness and no deformity  Lymphadenopathy:     She has no cervical adenopathy  She has no axillary adenopathy  Neurological: She is alert and oriented to person, place, and time  She has normal strength  No cranial nerve deficit or sensory deficit  GCS eye subscore is 4  GCS verbal subscore is 5  GCS motor subscore is 6  Skin: Skin is warm and dry  No rash noted  Psychiatric: She has a normal mood and affect  Her speech is normal and behavior is normal    Nursing note and vitals reviewed        Vital Signs  ED Triage Vitals   Temperature Pulse Respirations Blood Pressure SpO2   02/10/20 1835 02/10/20 1835 02/10/20 1835 02/10/20 1836 02/10/20 1835   98 3 °F (36 8 °C) (!) 54 16 167/70 97 %      Temp Source Heart Rate Source Patient Position - Orthostatic VS BP Location FiO2 (%)   02/10/20 1835 02/10/20 2007 02/10/20 2007 02/10/20 2007 --   Oral Monitor Lying Right arm       Pain Score       02/10/20 1835       8 Vitals:    02/10/20 2045 02/10/20 2100 02/10/20 2115 02/10/20 2130   BP: (!) 196/78 (!) 173/84 (!) 171/67 151/63   Pulse: 70 72 82 81   Patient Position - Orthostatic VS: Lying Lying Lying Lying         Visual Acuity      ED Medications  Medications   hydrALAZINE (APRESOLINE) injection 5 mg (5 mg Intravenous Given 2/10/20 2040)   mannitol 20 % IVPB premix 25 g 125 mL (0 g Intravenous Given to EMS 2/10/20 2142)   iohexol (OMNIPAQUE) 350 MG/ML injection (MULTI-DOSE) 85 mL (85 mL Intravenous Given 2/10/20 2136)       Diagnostic Studies  Results Reviewed     Procedure Component Value Units Date/Time    Protime-INR [[id number]]  (Normal) Collected:  02/10/20 2043    Lab Status:  Final result Specimen:  Blood from Arm, Right Updated:  02/10/20 2112     Protime 13 9 seconds      INR 1 13    APTT [[id number]]  (Normal) Collected:  02/10/20 2043    Lab Status:  Final result Specimen:  Blood from Arm, Right Updated:  02/10/20 2112     PTT 31 seconds     Troponin I [[id number]]  (Abnormal) Collected:  02/10/20 2005    Lab Status:  Final result Specimen:  Blood from Arm, Right Updated:  02/10/20 2036     Troponin I 0 05 ng/mL     Comprehensive metabolic panel [[id number]]  (Abnormal) Collected:  02/10/20 2005    Lab Status:  Final result Specimen:  Blood from Arm, Right Updated:  02/10/20 2034     Sodium 138 mmol/L      Potassium 4 9 mmol/L      Chloride 102 mmol/L      CO2 26 mmol/L      ANION GAP 10 mmol/L      BUN 24 mg/dL      Creatinine 1 05 mg/dL      Glucose 118 mg/dL      Calcium 9 3 mg/dL      AST 41 U/L      ALT 26 U/L      Alkaline Phosphatase 153 U/L      Total Protein 7 9 g/dL      Albumin 3 2 g/dL      Total Bilirubin 0 68 mg/dL      eGFR 50 ml/min/1 73sq m     Narrative:       Sheila guidelines for Chronic Kidney Disease (CKD):     Stage 1 with normal or high GFR (GFR > 90 mL/min/1 73 square meters)    Stage 2 Mild CKD (GFR = 60-89 mL/min/1 73 square meters)    Stage 3A Moderate CKD (GFR = 45-59 mL/min/1 73 square meters)    Stage 3B Moderate CKD (GFR = 30-44 mL/min/1 73 square meters)    Stage 4 Severe CKD (GFR = 15-29 mL/min/1 73 square meters)    Stage 5 End Stage CKD (GFR <15 mL/min/1 73 square meters)  Note: GFR calculation is accurate only with a steady state creatinine    CBC and differential [[id number]]  (Abnormal) Collected:  02/10/20 2005    Lab Status:  Final result Specimen:  Blood from Arm, Right Updated:  02/10/20 2016     WBC 11 26 Thousand/uL      RBC 4 70 Million/uL      Hemoglobin 13 1 g/dL      Hematocrit 41 2 %      MCV 88 fL      MCH 27 9 pg      MCHC 31 8 g/dL      RDW 15 5 %      MPV 11 3 fL      Platelets 997 Thousands/uL      nRBC 0 /100 WBCs      Neutrophils Relative 88 %      Immat GRANS % 1 %      Lymphocytes Relative 7 %      Monocytes Relative 4 %      Eosinophils Relative 0 %      Basophils Relative 0 %      Neutrophils Absolute 9 84 Thousands/µL      Immature Grans Absolute 0 08 Thousand/uL      Lymphocytes Absolute 0 81 Thousands/µL      Monocytes Absolute 0 49 Thousand/µL      Eosinophils Absolute 0 02 Thousand/µL      Basophils Absolute 0 02 Thousands/µL     UA w Reflex to Microscopic w Reflex to Culture [[id number]]     Lab Status:  No result Specimen:  Urine                  CTA head and neck with and without contrast   Final Result by Timmy Dutta MD (02/10 2228)      Reidentified large acute right temporal intraparenchymal hemorrhage with mass effect on the right lateral ventricular system and minimal to mild right to left midline shift  No focal stenosis or saccular aneurysm within the [place]  No hemodynamically significant stenosis within either common or internal carotid artery  Less than 50% stenosis by NASCET criteria  Partially visualized left pleural effusion              Workstation performed: [id number]         CT head without contrast   Final Result by Jonh Abreu MD (02/10 2047)      1   6 3 x 3 5 x 2 5 cm acute intracerebral hemorrhage in the right temporal lobe with mild mass effect but no midline shift or herniation  2   Probable small chronic right frontal subdural collection, 2 mm in thickness                  I personally discussed this study with Denilson Caro on 2/10/2020 at 8:30 PM                            Workstation performed: [id number]         XR chest 1 view portable    (Results Pending)              Procedures  ECG 12 Lead Documentation Only  Date/Time: 2/10/2020 7:55 PM  Performed by: Anitha Nelson DO  Authorized by: Anitha Nelson DO     Patient location:  ED  Rate:     ECG rate:  69  Rhythm:     Rhythm: sinus rhythm    Conduction:     Conduction: abnormal      Abnormal conduction: complete RBBB    CriticalCare Time  Performed by: Anitha Nelson DO  Authorized by: Anitha Nelson DO     Critical care provider statement:     Critical care time (minutes):  33    Critical care was necessary to treat or prevent imminent or life-threatening deterioration of the following conditions:  CNS failure or compromise    Critical care was time spent personally by me on the following activities:  Blood draw for specimens, obtaining history from patient or surrogate, development of treatment plan with patient or surrogate, discussions with consultants, discussions with primary provider, evaluation of patient's response to treatment, examination of patient, interpretation of cardiac output measurements, ordering and performing treatments and interventions, ordering and review of laboratory studies, ordering and review of radiographic studies and re-evaluation of patient's condition             ED Course  ED Course as of Feb 10 2242   Mon Feb 10, 2020   2101 Patient's most recent neuro exam now showing left-sided arm drifting                                  MDM  Number of Diagnoses or Management Options  Intracranial hemorrhage [facility]): new and requires workup     Amount and/or Complexity of Data Reviewed  Clinical lab tests: ordered and reviewed  Tests in the radiology section of CPT®: ordered and reviewed  Tests in the medicine section of CPT®: ordered and reviewed  Discuss the patient with other providers: yes (Discussed with both Neurosurgery critical care at [facility] patient to be urgently transferred to that facility for further treatment )          Disposition  Final diagnoses:   Intracranial hemorrhage ([facility] [address] )     Time reflects when diagnosis was documented in both MDM as applicable and the Disposition within this note     Time User Action Codes Description Comment    2/10/2020  8:52 PM Jackie TSE Add [I62 9] Intracranial hemorrhage [facility])       ED Disposition     ED Disposition Condition Date/Time Comment    Transfer to Another Facility-In Network  Mon Feb 10, 2020  8:52 PM Sebastian Schwarz should be transferred out to [facility] to the ICU          MD Documentation      Most Recent Value   Patient Condition  The patient has been stabilized such that within reasonable medical probability, no material deterioration of the patient condition or the condition of the unborn child(misti) is likely to result from the transfer   Reason for Transfer  Level of Care needed not available at this facility   Benefits of Transfer  Specialized equipment and/or services available at the receiving facility (Include comment)________________________   Risks of Transfer  Potential for delay in receiving treatment   Accepting Physician  Dr Madhu Dent Name, [address]    (Name & Tel number)  Jackie Gracia MD  [facility]   Provider Certification  General risk, such as traffic hazards, adverse weather conditions, rough terrain or turbulence, possible failure of equipment (including vehicle or aircraft), or consequences of actions of persons outside the control of the transport personnel, Unanticipated needs of medical equipment and personnel during transport, Risk of worsening condition, The possibility of a transport vehicle being unavailable      RN Documentation      Most [address] Name, [address]     (Name & Tel number)  Renee      Follow-up Information    None         Discharge Medication List as of 2/10/2020 10:06 PM      CONTINUE these medications which have NOT CHANGED    Details   allopurinol (ZYLOPRIM) 300 mg tablet Take 1 tablet (300 mg total) by mouth daily, Starting Tue 1/28/2020, Normal      aspirin 81 MG tablet Take 81 mg by mouth every morning  , Historical Med      bumetanide (BUMEX) 2 mg tablet Take 2 mg daily in AM except on M-W-F take 2mg twice a day, Normal      cholecalciferol (VITAMIN D3) 1,000 units tablet Take 1 tablet by mouth daily, Historical Med      clotrimazole-betamethasone (LOTRISONE) 1-0 05 % cream Apply topically 2 (two) times a day for 25 days, Starting Thu 1/2/2020, Until Mon 1/27/2020, Normal      ferrous sulfate 324 (65 Fe) mg Take 1 tablet (324 mg total) by mouth daily before breakfast, Starting Wed 5/22/2019, No Print      gabapentin (NEURONTIN) 300 mg capsule Take 1 capsule (300 mg total) by mouth daily at bedtime, Starting Fri 5/10/2019, Normal      halobetasol (ULTRAVATE) 0 05 % cream Starting Wed 1/23/2019, Historical Med      levothyroxine 112 mcg tablet Take 1 tablet (112 mcg total) by mouth daily, Starting Wed 11/20/2019, Normal      losartan (COZAAR) 25 mg tablet Take 1 tablet (25 mg total) by mouth 2 (two) times a day, Starting Fri 11/8/2019, Normal      Omega-3-acid Ethyl Esters & D3 1 & 1000 GM & UNIT KIT Take 2 capsules by mouth 2 (two) times a day, Starting Fri 6/2/2017, Historical Med      omeprazole (PriLOSEC) 20 mg delayed release capsule Take 1 capsule (20 mg total) by mouth every morning, Starting Tue 1/28/2020, Normal      propranolol (INDERAL) 80 mg tablet TAKE 1/2 TABLET EVERY 12 HOURS DAILY, Normal           No discharge procedures on file      ED Provider  Electronically Signed by           Jessica Nicolas DO  02/10/20 6688

## 2020-02-11 NOTE — PROGRESS NOTES
Patient seen and examined  Briefly this is an 50-year-old female with a spontaneous intraparenchymal hemorrhage  She presented with a GCS of 15 but has slowly become more lethargic  There is early signs of uncal herniation with compression of the brainstem and torsion  I have recommended an urgent Right decompressive craniectomy and evacuation of hematoma  In a detailed way I spoke with the patient's son, Maria Elena Mcgarry  He has questions reflecting is understanding of the general nature of the procedure in the life-threatening nature of this procedure  All questions were answered to his satisfaction  He wishes to proceed

## 2020-02-11 NOTE — H&P
H&P Exam - Critical Care   Tristan Orozco [de-identified] y o  female MRN: [id number]  Unit/Bed#: ICU 02 Encounter: [id number]      -------------------------------------------------------------------------------------------------------------  Chief Complaint: headache, nausea/vomiting, AMS    History of Present Illness     Tristan Orozco is a [de-identified] y o  female with past medical history of hypothyroidism, combined systolic diastolic heart failure, hypertension who presented with headache and nausea, altered mental status to [facility]  Patient reported that she had an headache starting yesterday evening and 1 episode of vomiting; patient's family at bedside reported altered mental status and elevated blood pressure at home  Patient was brought to ED for evaluation where CT scan showed acute intraparenchymal hemorrhage of right temporal lobe  Case was discussed with Neurosurgery who recommended transfer to [facility] for further evaluation  Patient was given 25 g of 20% mannitol on route  On arrival to [facility], pt was AAOx3 with no acute complaints  Family at bedside reported continued slowed speech  History obtained from patient, family at bedside, and chart review    -------------------------------------------------------------------------------------------------------------  Assessment and Plan:    Neuro:    Diagnosis: acute intraparenchymal hemorrhage of R temporal lobe  o With mass effect on right lateral ventricular system and right-to-left midline shift  o Admitted on stroke pathway  o High-intensity atorvastatin     o Aspirin held in setting of bleed  o Consult neurology, Neurosurgery  o Repeat echocardiogram, monitor on telemetry, repeat lipid panel and A1c  o NPO for now    CV:    Diagnosis: history of combined diastolic systolic heart failure  o Plan: repeat echo on stroke pathway  o However, most recent echo showed EF 55-60% with no regional wall motion abnormalities  o On bumex 2mg daily with 2mg BID on MWF, will discuss with attending  euvolemic on exam today   Diagnosis:  Hypertension  o Plan: Will treat with Cardene drip for blood pressure goals of 130-150 for now  home Losartan and propranolol held  Pulm:   WENDI on bipap at home, per patient  o bipap qhs    GI:    Diagnosis:  GERD  o Plan:  Continue PPI    :    Diagnosis:  No acute issues    F/E/N:    Fluids:  No current fluids   Electrolytes: Will monitor electrolytes and replete as needed   Nutrition:  Cardiovascular diet with sodium/fluid restriction    Heme/Onc:    Diagnosis:  No acute issues  o Plan:  No DVT prophylaxis given acute intraparenchymal hemorrhage    Endo:    Diagnosis:  History of hypothyroidism  o Plan:  Continue home levothyroxine    ID:    Diagnosis:  No acute issues  o Plan:  Monitor white count and forfevers    MSK/Skin:    frequent repositioning  PT/OT   Consider PMR consult    Disposition: Admit to Critical Care   Code Status: DNR DNI  --------------------------------------------------------------------------------------------------------------  Review of Systems   Constitutional: Negative for chills, fatigue and fever  HENT: Negative for rhinorrhea and sore throat  Respiratory: Negative for choking, chest tightness, shortness of breath, wheezing and stridor  Cardiovascular: Negative for chest pain, palpitations and leg swelling  Gastrointestinal: Negative for abdominal pain, blood in stool, constipation, diarrhea, nausea and vomiting  Genitourinary: Negative for hematuria  Neurological: Negative for dizziness and light-headedness  A 12-point, complete review of systems was reviewed and negative except as stated above     Physical Exam   Constitutional: She is oriented to person, place, and time  She appears well-developed and well-nourished  HENT:   Head: Normocephalic and atraumatic     Nose: Nose normal    Mouth/Throat: Oropharynx is clear and moist    Eyes: Right eye exhibits no discharge  Left eye exhibits no discharge  Cardiovascular: Normal rate, regular rhythm and normal heart sounds  Exam reveals no gallop and no friction rub  No murmur heard  Pulmonary/Chest: Effort normal and breath sounds normal  No respiratory distress  She has no wheezes  She has no rales  Abdominal: Soft  Bowel sounds are normal  She exhibits no distension  There is no tenderness  There is no guarding  Neurological: She is alert and oriented to person, place, and time  No cranial nerve deficit or sensory deficit  4/5 muscle strength HELEN MARINA  Drift noted to LLE  Able to name objects  AAO x3  Sensation intact bilateral upper and lower extremities, no dysmetria, visual fields intact  CN2-12intact  No significant dysarthria but appears fatigued  Skin: Skin is warm and dry  Psychiatric: Her speech is normal      --------------------------------------------------------------------------------------------------------------  Vitals:   Vitals:    02/10/20 2218 02/10/20 2300   BP:  156/62   Pulse: 83 80   Resp: 18 (!) 26   Temp: 98 1 °F (36 7 °C) 98 1 °F (36 7 °C)   TempSrc: Oral Oral   SpO2: 94% 98%   Weight:  94 8 kg (208 lb 15 9 oz)   Height:  5' 1" (1 549 m)     Temp  Min: 98 1 °F (36 7 °C)  Max: 98 3 °F (36 8 °C)  IBW: 47 8 kg  Height: 5' 1" (154 9 cm)  Body mass index is 39 49 kg/m²    N/A    Laboratory and Diagnostics:  Results from last 7 days   Lab Units 02/10/20  2005   WBC Thousand/uL 11 26*   HEMOGLOBIN g/dL 13 1   HEMATOCRIT % 41 2   PLATELETS Thousands/uL 193   NEUTROS PCT % 88*   MONOS PCT % 4     Results from last 7 days   Lab Units 02/10/20  2005   SODIUM mmol/L 138   POTASSIUM mmol/L 4 9   CHLORIDE mmol/L 102   CO2 mmol/L 26   ANION GAP mmol/L 10   BUN mg/dL 24   CREATININE mg/dL 1 05   CALCIUM mg/dL 9 3   GLUCOSE RANDOM mg/dL 118   ALT U/L 26   AST U/L 41   ALK PHOS U/L 153*   ALBUMIN g/dL 3 2*   TOTAL BILIRUBIN mg/dL 0 68          Results from last 7 days   Lab Units 02/10/20  2040 INR  1 13   PTT seconds 31      Results from last 7 days   Lab Units 02/10/20  2005   TROPONIN I ng/mL 0 05*         ABG:    VBG:          Micro:          Imaging: I have personally reviewed pertinent reports  Historical Information   Past Medical History:   Diagnosis Date    Anxiety     Arthritis     joints    Cataract     Disease of thyroid gland     Eczema     GERD (gastroesophageal reflux disease)     Gout     Heart failure ([address] )     Hepatitis     Hiatal hernia     Hypertension     Onychomycosis     last assessed: 16    Orthopnea     resolved: 16    Sleep apnea     wears CPAP     Past Surgical History:   Procedure Laterality Date    ABDOMINAL SURGERY          BREAST SURGERY Right     cyst removal    CARPAL TUNNEL RELEASE Left     CARPAL TUNNEL RELEASE Right     CATARACT EXTRACTION       SECTION  1960    x1    COLONOSCOPY N/A 2018    Procedure: COLONOSCOPY;  Surgeon: Siena Zelaya MD;  Location: [facility] GI LAB; Service: Gastroenterology    DILATION AND CURETTAGE OF UTERUS  1967    EYE SURGERY Left 2006    cataracts    HERNIA REPAIR      umbilical hernia    INCISIONAL HERNIA REPAIR      incarcerated    KNEE ARTHROSCOPY Right     WA XCAPSL CTRC RMVL INSJ IO LENS PROSTH W/O ECP Right 3/27/2017    Procedure: EXTRACTION EXTRACAPSULAR CATARACT PHACO INTRAOCULAR LENS (IOL);   Surgeon: John Bennett MD;  Location: [facility] MAIN OR;  Service: Ophthalmology     Social History   Social History     Substance and Sexual Activity   Alcohol Use Never    Alcohol/week: 0 0 standard drinks    Frequency: Never    Drinks per session: Patient refused    Binge frequency: Never    Comment: 0     Social History     Substance and Sexual Activity   Drug Use Never     Social History     Tobacco Use   Smoking Status Never Smoker   Smokeless Tobacco Never Used     Family History:   Family History   Problem Relation Age of Onset    Diabetes Mother     Coronary artery disease Mother     Arthritis Mother     Hypertension Mother     Hypertension Father     Diabetes Brother     Diabetes Son     Arthritis Family      I have reviewed this patient's family history and commented on sigificant items within the HPI      Medications:  Current Facility-Administered Medications   Medication Dose Route Frequency    atorvastatin (LIPITOR) tablet 40 mg  40 mg Oral QPM    levETIRAcetam (KEPPRA) tablet 500 mg  500 mg Oral Q12H [address]    levothyroxine tablet 112 mcg  112 mcg Oral Daily    niCARdipine (CARDENE) 25 mg (STANDARD CONCENTRATION) in sodium chloride 0 9% 250 mL  1-15 mg/hr Intravenous Titrated    pantoprazole (PROTONIX) EC tablet 40 mg  40 mg Oral Early Morning     Home medications:  Prior to Admission Medications   Prescriptions Last Dose Informant Patient Reported? Taking?    Omega-3-acid Ethyl Esters & D3 1 & 1000 GM & UNIT KIT  Self Yes No   Sig: Take 2 capsules by mouth 2 (two) times a day   allopurinol (ZYLOPRIM) 300 mg tablet   No No   Sig: Take 1 tablet (300 mg total) by mouth daily   aspirin 81 MG tablet  Self Yes No   Sig: Take 81 mg by mouth every morning     bumetanide (BUMEX) 2 mg tablet  Self No No   Sig: Take 2 mg daily in AM except on M-W-F take 2mg twice a day   cholecalciferol (VITAMIN D3) 1,000 units tablet  Self Yes No   Sig: Take 1 tablet by mouth daily   clotrimazole-betamethasone (LOTRISONE) 1-0 05 % cream   No No   Sig: Apply topically 2 (two) times a day for 25 days   ferrous sulfate 324 (65 Fe) mg  Self No No   Sig: Take 1 tablet (324 mg total) by mouth daily before breakfast   gabapentin (NEURONTIN) 300 mg capsule  Self No No   Sig: Take 1 capsule (300 mg total) by mouth daily at bedtime   halobetasol (ULTRAVATE) 0 05 % cream  Self Yes No   levothyroxine 112 mcg tablet   No No   Sig: Take 1 tablet (112 mcg total) by mouth daily   losartan (COZAAR) 25 mg tablet   No No   Sig: Take 1 tablet (25 mg total) by mouth 2 (two) times a day   omeprazole (PriLOSEC) 20 mg delayed release capsule   No No   Sig: Take 1 capsule (20 mg total) by mouth every morning   propranolol (INDERAL) 80 mg tablet  Self No No   Sig: TAKE 1/2 TABLET EVERY 12 HOURS DAILY      Facility-Administered Medications: None     Allergies:  No Known Allergies  ------------------------------------------------------------------------------------------------------------  Advance Directive and Living Will:      Power of :    POLST:    ------------------------------------------------------------------------------------------------------------  Anticipated Length of Stay is > 2 midnights    Counseling / Coordination of Care  Total Critical Care time spent 32 minutes excluding procedures, teaching and family updates  Neela Noe DO        Portions of the record may have been created with voice recognition software  Occasional wrong word or "sound a like" substitutions may have occurred due to the inherent limitations of voice recognition software    Read the chart carefully and recognize, using context, where substitutions have occurred

## 2020-02-11 NOTE — ANESTHESIA PREPROCEDURE EVALUATION
Review of Systems/Medical History  Patient summary reviewed        Cardiovascular  Hyperlipidemia, Hypertension , CHF ,   Comment: Transthoracic Echocardiogram  2D, M-mode, Doppler, and Color Doppler     Study date:  2019     Patient: Josh Marie  MR number: [id number]  Account number: [de-identified]  : 1940  Age: 77 years  Gender: Female  Status: Outpatient  Location: Echo lab  Height: 59 in  Weight: 225 5 lb  BP: 123/ 63 mmHg     Indications: SOB     Diagnoses: R06 00 - Dyspnea, unspecified     Sonographer:  COLT Hsu  Primary Physician:  Kim Mojica MD  Referring Physician:  HIEU Collins  Group:  [facility]  Interpreting Physician:  Oli Patricia MD     SUMMARY     LEFT VENTRICLE:  Systolic function was normal  Ejection fraction was estimated in the range of 55 % to 60 % to be 60 %  There were no regional wall motion abnormalities  Wall thickness was mildly increased      LEFT ATRIUM:  The atrium was moderately dilated      RIGHT ATRIUM:  The atrium was mildly dilated      MITRAL VALVE:  There was moderate-marked bulky posterior calcification  There was very mild stenosis  There was trace regurgitation      HISTORY: PRIOR HISTORY: HTN, CHF,obstructive sleep apnea, Chronic kidney disease, Hypothyroidism, Obesity,  Pulmonary  Shortness of breath, Sleep apnea ,        GI/Hepatic    GERD , Liver disease , Hepatitis ,  Hiatal hernia,        Kidney stones,        Endo/Other  History of thyroid disease ,      GYN       Hematology  Anemia ,     Musculoskeletal    Arthritis     Neurology   Psychology   Anxiety,                   Anesthesia Plan  ASA Score- 4     Anesthesia Type- general with ASA Monitors  Additional Monitors: arterial line  Airway Plan: ETT  Plan Factors-    Induction- intravenous  Postoperative Plan-     Informed Consent- Anesthetic plan and risks discussed with patient    I personally reviewed this patient with the CRNA  Discussed and agreed on the Anesthesia Plan with the CRNA  Caryn Buckley         Pt DNR but need to intubate for case and probabl keep intubated after

## 2020-02-11 NOTE — NUTRITION
If unable to extubate and safely advance PO diet in next 24 hrs then consider TF  In light of Propofol at 22 8 ml/hr, rec start Jevity 1 2 @ 10 ml/hr and incr by 10 ml q 4 hrs as william to goal rate of 35 ml/hr + 1 PROSOURCE to=qd: 840 ml,1068 Kcal,62 gm PRO,142 gm CHO

## 2020-02-11 NOTE — OR NURSING
Patient came into the [facility] briefly a she was for an emergent  surgery  Wedding band given to Lacie the ICU nurse

## 2020-02-11 NOTE — OCCUPATIONAL THERAPY NOTE
OT CANCEL NOTE    OT orders received  Chart reviewed  Pt is currently planned for the OR today for "[address] " Will hold initial OT evaluation  Will continue to follow pt on caseload and see pt when medically stable and as clinically appropriate, post-op      Jocelyne DOCKERY, OTR/L

## 2020-02-11 NOTE — ANESTHESIA POSTPROCEDURE EVALUATION
Post-Op Assessment Note    CV Status:  Stable  Pain Score: 0    Pain management: adequate     Mental Status:  Unresponsive (propofol gtt)   Hydration Status:  Euvolemic   PONV Controlled:  Controlled   Airway Patency:  Patent  Airway: intubated (ETT in situ)   Post Op Vitals Reviewed: Yes      Staff: CRNA           BP      Temp      Pulse     Resp      SpO2

## 2020-02-11 NOTE — PHYSICAL THERAPY NOTE
Physical Therapy Cancellation Note    PT orders received  Chart review completed  Pt off the floor for emergent craniotomy for SDH  PT will follow and eval as medically appropriate      Mode Frances, PT

## 2020-02-11 NOTE — ASSESSMENT & PLAN NOTE
large acute right temporal intraparenchymal hemorrhage with mass effect on the right lateral ventricular system and minimal to mild right to left midline shift   Noted 2/10/2020

## 2020-02-11 NOTE — PROGRESS NOTES
Daily Progress Note - Critical Care   Tigist Fermin [de-identified] y o  female MRN: [id number]  Unit/Bed#: ICU 02 Encounter: [id number]    ----------------------------------------------------------------------------------------  HPI/24hr events:  76-year-old female w/ PMH HTN , WENDI , HF, GERD, arthritis, anxiety p/w AMS, N/V, HA to [facility] 2/10  Patient lives at home with son and daughter-in-law, was described to be in her normal state during the morning but off from baseline by evening  CTH in the ED showed right parietal/temporal ICH with mass effect; patient's exam noted to have mild right-sided weakness and she was able to converse w/o focal findings  Patient transferred to [facility] for critical care neurosurgical management, given 25 g mannitol en route  Encephalopathy improved on admission, patient was awake and alert and answered questions appropriately  She was admitted and continued on the [address] stroke pathway as follows:  Neuro checks q 1 hour, SBP goals 130-150, Cardene, CTH a m , possible osmolar therapy with mannitol versus 3% saline, neurosurgery and Neurology consulted and aware, echo, lipid, A1c, MRI, CTA w/o vascular abnormality identified, stat repeat imaging w/ new focality or decrease in GCS by greater than 2 points  ICH volume approximately 29 mL, ICH score on admission 1   GCS on admission 15    Critical care course:  2/11 emergent decompressive craniectomy and evacuation of intraparenchymal hematoma (worsening exam 0330 --> repeat CTH showed increased IPH w/ early signs of uncal herniation  2/11 pm: patient returned to the ICU moving all 4 extremities volitionally but not to commands, VSS, goals per Neurosurgery, VIRGINIE drain to suction, Codman intracranial post-craniotomy ICP monitor placed, calibrated @ 499  ---------------------------------------------------------------------------------------  SUBJECTIVE  Patient seen and examined this morning, uncooperative w/ acutely worse exam, continues to have purposeful movements of bilateral upper and lower extremities; per night nursing, pt has tried to get out of bed multiple times and early in the morning was put on a Posey for agitation  Imaging this morning demonstrated interval increase in intraparenchymal hemorrhage - per NSG patient to go to OR for craniectomy and evacuation  Review of Systems   Unable to perform ROS: Other   Patient refusing to cooperate with exam early this am, but does admit that her HA improved from last night, denies abdominal pain, denies persistent N/V  ---------------------------------------------------------------------------------------  Assessment and Plan:    Neuro:    Diagnosis:  ICH  o CTH:  Acute ICH in the right temporal lobe with mild mass effect but no significant midline shift or herniation, small chronic right frontal subdural collection 2 mm  o CTA:  Large acute right temporal intraparenchymal hemorrhage with mass effect on the right lateral ventricle, minimal to mild right-to-left midline shift, no focal stenosis or saccular aneurysm within the [place], no hemodynamically significant stenosis in either ICA, less than 50% stenosis by NASCET criteria  o Plan:   - High-intensity atorvastatin  - Keppra 500 mg b i d  P o   - Q 1 hour neuro checks (adjusted postoperatively)  - Cardene/lali  - Goal SBP < 140  - Neurology and neurosurgery following, appreciate recommendations  - Follow-up repeat imaging, ECHO, tele, A1c, NPO until neuro exam improved      CV:   · Diagnosis:  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 Most recent Echo LVEF 55-60% with no regional wall motion abnormalities  ? Home meds Bumex 2 mg daily with b i d  Dosing on Monday Wednesday Friday  ? Plan:    ? stroke pathway,  ? Repeat echo  ? Consider restarting home meds with stabilized  · Diagnosis:   HLD  ? Plan: f/u labs  · Diagnosis:  HTN  ? Plan:    ? On admission Cardene drip for blood pressure goals 130-150   ?  No drips postop, consider restarting home meds when stabilized     Pulm:  · Diagnosis:  WENDI on BiPAP  ? Plan:    ? Hold Bipap qhs pending breathing trial     GI:   · Diagnosis:   GERD   ? Plan:  continue PPI     :   · Diagnosis:   no acute issues     F/E/N:   · Fluids:   fluid started per Neurosurgery, normal saline at 75  · Monitor electrolytes, I&Os, daily weights  · Electrolytes:     · monitor electrolytes and replete as necessary  · Nutrition:     · NPO prior to surgery, address nutrition following improved neuro exam     Heme/Onc:   · Diagnosis:   no acute issues  ? Plan:     ? DVT prophylaxis held given IPH     Endo:   · Diagnosis:   history of hypothyroidism  ? Plan:    ? Home levothyroxine 112 mcg     ID:   · Diagnosis:   no acute issues  ? Plan:    ? Follow-up CBC  ? Monitor temps     MSK/Skin:   · Frequent repositioning  · PT/OT  · Consider PMR consult when appropriate      Disposition: Continue Critical Care   Code Status: Level 3 - DNAR and DNI  ---------------------------------------------------------------------------------------  ICU CORE MEASURES    Prophylaxis   VTE Pharmacologic Prophylaxis: Pharmacologic VTE Prophylaxis contraindicated due to Intraparenchymal hemorrhage  VTE Mechanical Prophylaxis: sequential compression device  Stress Ulcer Prophylaxis: Pantoprazole PO    ABCDE Protocol (if indicated)  Plan to perform spontaneous awakening trial today? Yes  Plan to perform spontaneous breathing trial today? No  Obvious barriers to extubation?  Yes  CAM-ICU: Positive    Invasive Devices Review  Invasive Devices     Peripheral Intravenous Line            Peripheral IV 02/11/20 Distal;Left;Ventral (anterior) Wrist less than 1 day    Peripheral IV 02/11/20 Left Antecubital less than 1 day    Peripheral IV 02/11/20 Right Forearm less than 1 day          Arterial Line            Arterial Line 02/11/20 Radial less than 1 day          Drain            Closed/Suction Drain Right Head Bulb less than 1 day    ICP Device ICP microsensor fiber Right Frontal region less than 1 day    NG/OG/Enteral Tube Orogastric Center mouth less than 1 day    Urethral Catheter Non-latex; Temperature probe 14 Fr  less than 1 day    Ventriculostomy/Subdural Subdural drainage catheter Right Temporal region less than 1 day          Airway            ETT  Cuffed; Hi-Lo 7 mm less than 1 day              Can any invasive devices be discontinued today? No  ---------------------------------------------------------------------------------------  OBJECTIVE    Vitals     Vitals:    20 1300 20 1315 20 1330 20 1400   BP:       Pulse: 74 86 76 70   Resp: 19 (!) 30 (!) 11 12   Temp: 98 6 °F (37 °C) 98 6 °F (37 °C) 98 6 °F (37 °C) 99 °F (37 2 °C)   TempSrc:       SpO2: 95% 95% 97% 96%   Weight:       Height:         Temp (24hrs), Av 6 °F (37 °C), Min:98 1 °F (36 7 °C), Max:99 °F (37 2 °C)  Current: Temperature: 99 °F (37 2 °C)  HR: 86  BP: 126/41  RR: 19  SpO2: 95-98%    Physical Exam   Constitutional: She appears well-developed and well-nourished  She appears distressed (irritable)  Patient is sleeping comfortably in bed prior to exam   HENT:   Head: Normocephalic  Right Ear: External ear normal    Left Ear: External ear normal    Mouth/Throat: Oropharynx is clear and moist    Eyes: Pupils are equal, round, and reactive to light  Right eye exhibits discharge  Left eye exhibits discharge  No scleral icterus  EOMI grossly on observation, but patient cooperates minimally; bilateral clear discharge, minimal   Neck: No JVD present  No tracheal deviation present  No thyromegaly present  Cardiovascular: Normal rate, regular rhythm and intact distal pulses  Exam reveals no gallop and no friction rub  No murmur heard  Distal pulses faint   Pulmonary/Chest: Effort normal  No respiratory distress  She has wheezes (Possibly intermittent inspiratory)   She exhibits tenderness (New generalized chest wall tenderness on cardiac exam, reproducible)  Abdominal: Soft  She exhibits distension (Mild, abdomen obese)  She exhibits no mass  There is no tenderness  There is no rebound and no guarding  Bowel sounds loud   Musculoskeletal: She exhibits no edema  Neurological: She displays abnormal reflex (Brisk bilateral upper and lower extremities)  A cranial nerve deficit is present  No sensory deficit (Withdraws to pain in all 4 extremities)  Difficulty performing neuro exam due to patient failure to cooperate, by observation:  cranial nerves 2-7 grossly intact and symmetrical, pupils responsive but sluggish, unable to elicit responses for cranial nerves 9-11, patient able to hear bilaterally, patient able to speak without dysarthria   Skin: She is not diaphoretic  There is erythema (Right upper eyelid)     Distended veins bilateral lower extremities, milder bilateral upper extremities   Psychiatric:   Difficult to arouse, uncooperative, irritable     PE post-op:  On return to unit patient was sedated but arousable, pupils constricted, moving BUE and BLE volitionally but not to commands, w/d to noxious stimuli LUE/BLE, DTRs intact BUE/BLE; RRR, CTAB, abdomen soft, distal pulses intact but faint --> on propofol 50 and fentanyl 25 on exam - plan to wean sedation, trial pressure support for ventilation    Respiratory:  SpO2: SpO2: 96 %, SpO2 Activity: SpO2 Activity: At Rest, SpO2 Device: O2 Device: Nasal cannula, Capnography:    O2 Flow Rate (L/min): 3 L/min    Invasive/non-invasive ventilation settings   Respiratory    Lab Data (Last 4 hours)    None         O2/Vent Data (Last 4 hours)      02/11 1204           Vent Mode AC/VC       Resp Rate (BPM) (BPM) 12       Vt (mL) (mL) 500       FIO2 (%) (%) 60       PEEP (cmH2O) (cmH2O) 5       Patient safety screen outcome: Failed       MV 6 6                 Laboratory and Diagnostics:  Results from last 7 days   Lab Units 02/11/20  1011 02/11/20  0806 02/10/20  2005   WBC Thousand/uL  --  13 51* 11 26*   HEMOGLOBIN g/dL  --  11 6 13 1   I STAT HEMOGLOBIN g/dl 9 9*  --   --    HEMATOCRIT %  --  36 9 41 2   HEMATOCRIT, ISTAT % 29*  --   --    PLATELETS Thousands/uL  --  182 193   NEUTROS PCT %  --  89* 88*   MONOS PCT %  --  5 4     Results from last 7 days   Lab Units 02/11/20  1011 02/11/20  0806 02/10/20  2005   SODIUM mmol/L  --  141 138   POTASSIUM mmol/L  --  3 6 4 9   CHLORIDE mmol/L  --  106 102   CO2 mmol/L  --  31 26   CO2, I-STAT mmol/L 28  --   --    ANION GAP mmol/L  --  4 10   BUN mg/dL  --  21 24   CREATININE mg/dL  --  1 14 1 05   CALCIUM mg/dL  --  9 1 9 3   GLUCOSE RANDOM mg/dL  --  128 118   ALT U/L  --  19 26   AST U/L  --  15 41   ALK PHOS U/L  --  132* 153*   ALBUMIN g/dL  --  3 1* 3 2*   TOTAL BILIRUBIN mg/dL  --  0 49 0 68     Results from last 7 days   Lab Units 02/11/20  0604   MAGNESIUM mg/dL 2 0   PHOSPHORUS mg/dL 4 0      Results from last 7 days   Lab Units 02/10/20  2043   INR  1 13   PTT seconds 31      Results from last 7 days   Lab Units 02/10/20  2005   TROPONIN I ng/mL 0 05*         ABG:    VBG:          Micro        EKG:   NSR, QT//480, right bundle branch block, T-wave abnormality consider inferior ischemia    Imaging:  CT head wo contrast [[id number]] 02/11/20 0529   CT BRAIN - WITHOUT CONTRASTINDICATION:   Stroke, follow up  COMPARISON:  CT brain dated February 10, 2020 at 8:17 PM   FINDINGS:  PARENCHYMA:  Again noted is acute intraparenchymal hemorrhage involving the right temporal lobe with surrounding vasogenic edema and mass effect on the right lateral ventricular system   The size of this hematoma measures approximately 7 5 x 4 0 x 2 3 cm  (AP, transverse, cc), minimally increased in size from the prior exam   There is approximately 4 mm of right-to-left midline shift, stable from the prior exam   VENTRICLES AND EXTRA-AXIAL SPACES:  See above   No hydrocephalus  VISUALIZED ORBITS AND PARANASAL SINUSES:  Unremarkable    CALVARIUM AND EXTRACRANIAL SOFT TISSUES:  Normal    Impression:     Reidentified large acute intraparenchymal hemorrhage involving the right temporal lobe with mass effect on the right lateral ventricle and minimal to mild right to left midline shift  The size of this intraparenchymal hematoma has minimally increased from the prior exam  primarily in the AP dimension  CTA head and neck with and without contrast [[id number]]  02/10/20 2216   CTA NECK AND BRAIN WITH CONTRAST INDICATION: Neuro deficit, acute, stroke suspected acute intracranial hemorrhage  COMPARISON:   CT brain unenhanced performed approximately 1 hour prior  FINDINGS: Again noted is a large area of acute intracranial hemorrhage involving the right temporal lobe with mass effect on the right lateral ventricular system   There is approximately 5 mm of left-to-right midline shift, stable  CERVICAL VASCULATURE  AORTIC ARCH AND GREAT VESSELS:  Normal aortic arch and great vessel origins  Normal visualized subclavian vessels  RIGHT VERTEBRAL ARTERY CERVICAL SEGMENT:  Normal origin  The vessel is small in caliber throughout the neck  LEFT VERTEBRAL ARTERY CERVICAL SEGMENT:  Normal origin  The vessel is normal in caliber throughout the neck  RIGHT EXTRACRANIAL CAROTID SEGMENT:  Mild atherosclerotic disease of the distal common carotid artery and proximal cervical internal carotid artery without significant stenosis compared to the more distal ICA  LEFT EXTRACRANIAL CAROTID SEGMENT:  Mild atherosclerotic disease of the distal common carotid artery and proximal cervical internal carotid artery without significant stenosis compared to the more distal ICA  NASCET criteria was used to determine the degree of internal carotid artery diameter stenosis  INTRACRANIAL VASCULATURE   INTERNAL CAROTID ARTERIES:  Normal enhancement of the intracranial portions of the internal carotid arteries   There is atherosclerotic calcification of the bilateral cavernous internal carotid arteries   Normal ophthalmic artery origins   Normal ICA   terminus  ANTERIOR CIRCULATION:  Symmetric A1 segments and anterior cerebral arteries with normal enhancement   Normal anterior communicating artery  MIDDLE CEREBRAL ARTERY CIRCULATION:  M1 segment and middle cerebral artery branches demonstrate normal enhancement bilaterally  DISTAL VERTEBRAL ARTERIES: Meño Ornelas is a dominant distal left vertebral artery   Posterior inferior cerebellar artery origins are normal  Normal vertebral basilar junction  BASILAR ARTERY:  Basilar artery is normal in caliber   Normal superior cerebellar arteries  POSTERIOR CEREBRAL ARTERIES: Both posterior cerebral arteries arises from the basilar tip   Both arteries demonstrate normal enhancement    Normal posterior communicating arteries  DURAL VENOUS SINUSES:  Normal   NON VASCULAR ANATOMY  BONY STRUCTURES:  No acute osseous abnormality  SOFT TISSUES OF THE NECK:  Normal  THORACIC INLET: Meño Ornelas is a partially visualized left pleural effusion  Meño Ornelas is interstitial prominence noted at both lung apices with scattered areas of groundglass density suspicious for mild pulmonary vascular congestion  Impression:     Reidentified large acute right temporal intraparenchymal hemorrhage with mass effect on the right lateral ventricular system and minimal to mild right to left midline shift  No focal stenosis or saccular aneurysm within the [place]  No hemodynamically significant stenosis within either common or internal carotid artery   Less than 50% stenosis by NASCET criteria  Partially visualized left pleural effusion  XR chest 1 view portable [[id number]] 02/11/20 0802   CHEST INDICATION:   htn  COMPARISON:  February 26, 2019  FINDINGS:  Top normal heart size   Calcified plaque within the arch   Mild central pulmonary vascular distention  The lungs are clear   No pneumothorax or pleural effusion  Osseous structures appear within normal limits for patient age     Impression:     Stable compared to prior  No acute cardiopulmonary disease  CT head without contrast [[id number]]  02/10/20 2025   CT BRAIN - WITHOUT CONTRAST  INDICATION:   Headache, acute, normal neuro exam   [de-identified]year-old female with elevated blood pressure, headache, nausea and vomiting  FINDINGS:  PARENCHYMA:  No intracranial mass  No CT signs of acute infarction   6 3 x 3 5 x 2 5 cm acute hemorrhage in the right temporal lobe with surrounding edema, effacement of the overlying cortical sulci and of the right sylvian fissure   There is mild mass   effect and compression of the right lateral ventricle   However, there is no midline shift and no subfalcine or uncal herniation   Hemorrhage volume approximately 29 mL  VENTRICLES AND EXTRA-AXIAL SPACES:  Narrowing of the right lateral ventricle, as mentioned above   Left lateral ventricle and 3rd and 4th ventricles unremarkable and unchanged from the CT from 2016   Mild effacement of the right side of the   perimesencephalic cistern   Basilar cisterns otherwise unremarkable   Suggestion of small chronic right frontal subdural collection, 2 mm in maximal thickness with no mass effect (series 2, image 21; series 400, images 29-37)  VISUALIZED ORBITS AND PARANASAL SINUSES:  Unremarkable  CALVARIUM AND EXTRACRANIAL SOFT TISSUES:  Normal    Impression:     1   6 3 x 3 5 x 2 5 cm acute intracerebral hemorrhage in the right temporal lobe with mild mass effect but no midline shift or herniation  2   Probable small chronic right frontal subdural collection, 2 mm in thickness       Intake and Output    Intake/Output Summary (Last 24 hours) at 2/11/2020 1540  Last data filed at 2/11/2020 1401  Gross per 24 hour   Intake 2131 51 ml   Output 2875 ml   Net -743 49 ml     I/O       02/09 0701 - 02/10 0700 02/10 0701 - 02/11 0700 02/11 0701 - 02/12 0700    I V  (mL/kg)  501 5 (5 3) 1100 (11 6)    NG/GT   30    IV Piggyback   500    Total Intake(mL/kg)  501 5 (5 3) 1630 (17 2)    Urine (mL/kg/hr)  1000 1625 (2)    Drains   100    Blood   150    Total Output  1000 1875    Net  -498 5 -245               Height and Weights   Height: 5' 1" (154 9 cm)  IBW: 47 8 kg  Body mass index is 39 49 kg/m²  Weight (last 2 days)     Date/Time   Weight    02/11/20 0600   94 8 (209)    02/10/20 2336   94 8 (209)    02/10/20 2300   94 8 (209)            Nutrition       Diet Orders   (From admission, onward)             Start     Ordered    02/11/20 1224  Diet NPO  Diet effective now     Question Answer Comment   Diet Type NPO    RD to adjust diet per protocol?  Yes        02/11/20 1223              TF - reassess tomorrow    Active Medications  Scheduled Meds:    Current Facility-Administered Medications:  atorvastatin 40 mg Oral QPM Luis Sanabria MD    bisacodyl 10 mg Rectal Daily PRN Eleni Tang MD    cefazolin 1,000 mg Intravenous Q8H Eleni Tang MD    chlorhexidine 15 mL Swish & Spit Q12H Dotty Bhagat MD    docusate sodium 100 mg Oral BID Eleni Tang MD    fentaNYL 25 mcg/hr Intravenous Continuous Eleni Tang MD Last Rate: 25 mcg/hr (02/11/20 1240)   fentanyl citrate (PF) 50 mcg Intravenous Q1H PRN Jannette Sood MD    fentanyl citrate (PF) 50 mcg Intravenous Q1H PRN Dmitry Queen DO    levETIRAcetam 500 mg Oral Q12H Dotty Bhagat MD    levothyroxine 112 mcg Oral Early Morning Luis Sanabria MD    niCARdipine 1-15 mg/hr Intravenous Titrated Neela Noe DO Last Rate: Stopped (02/11/20 0631)   ondansetron 4 mg Intravenous Q4H PRN Eleni Tang MD    pantoprazole 40 mg Oral Early Morning Luis Sanabria MD    propofol 5-50 mcg/kg/min Intravenous Titrated Eleni Tang MD Last Rate: 40 mcg/kg/min (02/11/20 1200)   sodium chloride 75 mL/hr Intravenous Continuous Eleni Tang MD Last Rate: 75 mL/hr (02/11/20 1230)     Continuous Infusions:    fentaNYL 25 mcg/hr Last Rate: 25 mcg/hr (02/11/20 1240)   niCARdipine 1-15 mg/hr Last Rate: Stopped (02/11/20 0631) propofol 5-50 mcg/kg/min Last Rate: 40 mcg/kg/min (02/11/20 1200)   sodium chloride 75 mL/hr Last Rate: 75 mL/hr (02/11/20 1230)     PRN Meds:     bisacodyl 10 mg Daily PRN   fentanyl citrate (PF) 50 mcg Q1H PRN   fentanyl citrate (PF) 50 mcg Q1H PRN   ondansetron 4 mg Q4H PRN     Allergies   No Known Allergies  ---------------------------------------------------------------------------------------  Advance Directive and Living Will:      Power of :    POLST:    ---------------------------------------------------------------------------------------    [id number] [employer] [facility] Student, Class 2020  [id number] at [address] [place] [address]    Portions of the record may have been created with voice recognition software  Occasional wrong word or "sound a like" substitutions may have occurred due to the inherent limitations of voice recognition software    Read the chart carefully and recognize, using context, where substitutions have occurred

## 2020-02-11 NOTE — EMTALA/ACUTE CARE TRANSFER
[address] [place] [address] [place] [address]  Dept: [phone]      EMTALA TRANSFER CONSENT    NAME Jered Weinberg                                         1940                              MRN [id number]    I have been informed of my rights regarding examination, treatment, and transfer   by Dr Duane Burk DO    Benefits: Specialized equipment and/or services available at the receiving facility (Include comment)________________________    Risks: Potential for delay in receiving treatment      Consent for Transfer:  I acknowledge that my medical condition has been evaluated and explained to me by the emergency department physician or other qualified medical person and/or my attending physician, who has recommended that I be transferred to the service of  Accepting Physician: Dr Oscar Goldstein at [facility] Name, [address] : [facility]  The above potential benefits of such transfer, the potential risks associated with such transfer, and the probable risks of not being transferred have been explained to me, and I fully understand them  The doctor has explained that, in my case, the benefits of transfer outweigh the risks  I agree to be transferred  I authorize the performance of emergency medical procedures and treatments upon me in both transit and upon arrival at the receiving facility  Additionally, I authorize the release of any and all medical records to the receiving facility and request they be transported with me, if possible  I understand that the safest mode of transportation during a medical emergency is an ambulance and that the Hospital advocates the use of this mode of transport  Risks of traveling to the receiving facility by car, including absence of medical control, life sustaining equipment, such as oxygen, and medical personnel has been explained to me and I fully understand them      (MARY CORRECT BOX BELOW)  [  ]  I consent to the stated transfer and to be transported by ambulance/helicopter  [  ]  I consent to the stated transfer, but refuse transportation by ambulance and accept full responsibility for my transportation by car  I understand the risks of non-ambulance transfers and I exonerate the Hospital and its staff from any deterioration in my condition that results from this refusal     X___________________________________________    DATE  02/10/20  TIME________  Signature of patient or legally responsible individual signing on patient behalf           RELATIONSHIP TO PATIENT_________________________          Provider Certification    NAME [address] [place] [address] 1940                              MRN [id number]    A medical screening exam was performed on the above named patient  Based on the examination:    Condition Necessitating Transfer The encounter diagnosis was Intracranial hemorrhage ([address] )  Patient Condition: The patient has been stabilized such that within reasonable medical probability, no material deterioration of the patient condition or the condition of the unborn child(misti) is likely to result from the transfer    Reason for Transfer: Level of Care needed not available at this facility    Transfer Requirements: [facility]   · Space available and qualified personnel available for treatment as acknowledged by Fanny Sargent  · Agreed to accept transfer and to provide appropriate medical treatment as acknowledged by       Dr Moy Goodwin  · Appropriate medical records of the examination and treatment of the patient are provided at the time of transfer   [address], [address] _______  · Transfer will be performed by qualified personnel from    and appropriate transfer equipment as required, including the use of necessary and appropriate life support measures      Provider Certification: I have examined the patient and explained the following risks and benefits of being transferred/refusing transfer to the patient/family:  General risk, such as traffic hazards, adverse weather conditions, rough terrain or turbulence, possible failure of equipment (including vehicle or aircraft), or consequences of actions of persons outside the control of the transport personnel, Unanticipated needs of medical equipment and personnel during transport, Risk of worsening condition, The possibility of a transport vehicle being unavailable      Based on these reasonable risks and benefits to the patient and/or the unborn child(misti), and based upon the information available at the time of the patients examination, I certify that the medical benefits reasonably to be expected from the provision of appropriate medical treatments at another medical facility outweigh the increasing risks, if any, to the individuals medical condition, and in the case of labor to the unborn child, from effecting the transfer      X____________________________________________ DATE 02/10/20        TIME_______      ORIGINAL - SEND TO MEDICAL RECORDS   COPY - SEND WITH PATIENT DURING TRANSFER

## 2020-02-12 ENCOUNTER — APPOINTMENT (INPATIENT)
Dept: RADIOLOGY | Facility: HOSPITAL | Age: 80
DRG: 023 | End: 2020-02-12
Payer: MEDICARE

## 2020-02-12 ENCOUNTER — APPOINTMENT (INPATIENT)
Dept: NON INVASIVE DIAGNOSTICS | Facility: HOSPITAL | Age: 80
DRG: 023 | End: 2020-02-12
Payer: MEDICARE

## 2020-02-12 LAB
ABO GROUP BLD: NORMAL
ANION GAP SERPL CALCULATED.3IONS-SCNC: 6 MMOL/L (ref 4–13)
ANION GAP SERPL CALCULATED.3IONS-SCNC: 8 MMOL/L (ref 4–13)
BASE EXCESS BLDA CALC-SCNC: 1.7 MMOL/L
BLD GP AB SCN SERPL QL: NEGATIVE
BODY TEMPERATURE: 97.9 DEGREES FEHRENHEIT
BUN SERPL-MCNC: 22 MG/DL (ref 5–25)
BUN SERPL-MCNC: 25 MG/DL (ref 5–25)
CALCIUM SERPL-MCNC: 8.5 MG/DL (ref 8.3–10.1)
CALCIUM SERPL-MCNC: 8.5 MG/DL (ref 8.3–10.1)
CHLORIDE SERPL-SCNC: 109 MMOL/L (ref 100–108)
CHLORIDE SERPL-SCNC: 112 MMOL/L (ref 100–108)
CO2 SERPL-SCNC: 27 MMOL/L (ref 21–32)
CO2 SERPL-SCNC: 28 MMOL/L (ref 21–32)
CREAT SERPL-MCNC: 1.11 MG/DL (ref 0.6–1.3)
CREAT SERPL-MCNC: 1.15 MG/DL (ref 0.6–1.3)
ERYTHROCYTE [DISTWIDTH] IN BLOOD BY AUTOMATED COUNT: 15.9 % (ref 11.6–15.1)
GFR SERPL CREATININE-BSD FRML MDRD: 45 ML/MIN/1.73SQ M
GFR SERPL CREATININE-BSD FRML MDRD: 47 ML/MIN/1.73SQ M
GLUCOSE SERPL-MCNC: 150 MG/DL (ref 65–140)
GLUCOSE SERPL-MCNC: 151 MG/DL (ref 65–140)
HCO3 BLDA-SCNC: 24.9 MMOL/L (ref 22–28)
HCT VFR BLD AUTO: 30.2 % (ref 34.8–46.1)
HGB BLD-MCNC: 10 G/DL (ref 11.5–15.4)
HOROWITZ INDEX BLDA+IHG-RTO: 60 MM[HG]
MAGNESIUM SERPL-MCNC: 2.1 MG/DL (ref 1.6–2.6)
MCH RBC QN AUTO: 28.4 PG (ref 26.8–34.3)
MCHC RBC AUTO-ENTMCNC: 33.1 G/DL (ref 31.4–37.4)
MCV RBC AUTO: 86 FL (ref 82–98)
O2 CT BLDA-SCNC: 14.6 ML/DL (ref 16–23)
OXYHGB MFR BLDA: 96.3 % (ref 94–97)
PCO2 BLDA: 33.8 MM HG (ref 36–44)
PCO2 TEMP ADJ BLDA: 33.2 MM HG (ref 36–44)
PEEP RESPIRATORY: 5 CM[H2O]
PH BLD: 7.49 [PH] (ref 7.35–7.45)
PH BLDA: 7.49 [PH] (ref 7.35–7.45)
PHOSPHATE SERPL-MCNC: 2.7 MG/DL (ref 2.3–4.1)
PLATELET # BLD AUTO: 140 THOUSANDS/UL (ref 149–390)
PMV BLD AUTO: 11.2 FL (ref 8.9–12.7)
PO2 BLD: 81.9 MM HG (ref 75–129)
PO2 BLDA: 84 MM HG (ref 75–129)
POTASSIUM SERPL-SCNC: 3.2 MMOL/L (ref 3.5–5.3)
POTASSIUM SERPL-SCNC: 3.9 MMOL/L (ref 3.5–5.3)
RBC # BLD AUTO: 3.52 MILLION/UL (ref 3.81–5.12)
RH BLD: POSITIVE
SODIUM SERPL-SCNC: 145 MMOL/L (ref 136–145)
SODIUM SERPL-SCNC: 145 MMOL/L (ref 136–145)
SPECIMEN EXPIRATION DATE: NORMAL
SPECIMEN SOURCE: ABNORMAL
VENT AC: 12
VENT- AC: AC
VT SETTING VENT: 500 ML
WBC # BLD AUTO: 15.18 THOUSAND/UL (ref 4.31–10.16)

## 2020-02-12 PROCEDURE — 99233 SBSQ HOSP IP/OBS HIGH 50: CPT | Performed by: PHYSICIAN ASSISTANT

## 2020-02-12 PROCEDURE — 94003 VENT MGMT INPAT SUBQ DAY: CPT

## 2020-02-12 PROCEDURE — 70450 CT HEAD/BRAIN W/O DYE: CPT

## 2020-02-12 PROCEDURE — 84100 ASSAY OF PHOSPHORUS: CPT | Performed by: EMERGENCY MEDICINE

## 2020-02-12 PROCEDURE — 99024 POSTOP FOLLOW-UP VISIT: CPT | Performed by: PHYSICIAN ASSISTANT

## 2020-02-12 PROCEDURE — 94760 N-INVAS EAR/PLS OXIMETRY 1: CPT

## 2020-02-12 PROCEDURE — 82805 BLOOD GASES W/O2 SATURATION: CPT | Performed by: EMERGENCY MEDICINE

## 2020-02-12 PROCEDURE — 83735 ASSAY OF MAGNESIUM: CPT | Performed by: EMERGENCY MEDICINE

## 2020-02-12 PROCEDURE — 85027 COMPLETE CBC AUTOMATED: CPT | Performed by: NEUROLOGICAL SURGERY

## 2020-02-12 PROCEDURE — NC001 PR NO CHARGE: Performed by: NEUROLOGICAL SURGERY

## 2020-02-12 PROCEDURE — 99291 CRITICAL CARE FIRST HOUR: CPT | Performed by: EMERGENCY MEDICINE

## 2020-02-12 PROCEDURE — 80048 BASIC METABOLIC PNL TOTAL CA: CPT | Performed by: EMERGENCY MEDICINE

## 2020-02-12 PROCEDURE — 93306 TTE W/DOPPLER COMPLETE: CPT | Performed by: INTERNAL MEDICINE

## 2020-02-12 PROCEDURE — 93306 TTE W/DOPPLER COMPLETE: CPT

## 2020-02-12 RX ORDER — POTASSIUM CHLORIDE 14.9 MG/ML
20 INJECTION INTRAVENOUS ONCE
Status: COMPLETED | OUTPATIENT
Start: 2020-02-12 | End: 2020-02-12

## 2020-02-12 RX ORDER — HYDRALAZINE HYDROCHLORIDE 20 MG/ML
10 INJECTION INTRAMUSCULAR; INTRAVENOUS ONCE
Status: COMPLETED | OUTPATIENT
Start: 2020-02-12 | End: 2020-02-12

## 2020-02-12 RX ORDER — CHLORHEXIDINE GLUCONATE 0.12 MG/ML
RINSE ORAL
Status: COMPLETED
Start: 2020-02-12 | End: 2020-02-12

## 2020-02-12 RX ORDER — DEXAMETHASONE SODIUM PHOSPHATE 10 MG/ML
10 INJECTION, SOLUTION INTRAMUSCULAR; INTRAVENOUS ONCE
Status: COMPLETED | OUTPATIENT
Start: 2020-02-12 | End: 2020-02-12

## 2020-02-12 RX ORDER — LEVETIRACETAM 500 MG/1
TABLET ORAL
Status: COMPLETED
Start: 2020-02-12 | End: 2020-02-12

## 2020-02-12 RX ORDER — DEXAMETHASONE SODIUM PHOSPHATE 4 MG/ML
4 INJECTION, SOLUTION INTRA-ARTICULAR; INTRALESIONAL; INTRAMUSCULAR; INTRAVENOUS; SOFT TISSUE EVERY 6 HOURS SCHEDULED
Status: DISCONTINUED | OUTPATIENT
Start: 2020-02-12 | End: 2020-02-13

## 2020-02-12 RX ORDER — POTASSIUM CHLORIDE 20 MEQ/1
40 TABLET, EXTENDED RELEASE ORAL ONCE
Status: DISCONTINUED | OUTPATIENT
Start: 2020-02-12 | End: 2020-02-12

## 2020-02-12 RX ORDER — HYDRALAZINE HYDROCHLORIDE 20 MG/ML
INJECTION INTRAMUSCULAR; INTRAVENOUS
Status: COMPLETED
Start: 2020-02-12 | End: 2020-02-12

## 2020-02-12 RX ORDER — POTASSIUM CHLORIDE 20MEQ/15ML
40 LIQUID (ML) ORAL ONCE
Status: COMPLETED | OUTPATIENT
Start: 2020-02-12 | End: 2020-02-12

## 2020-02-12 RX ORDER — BUMETANIDE 0.25 MG/ML
2 INJECTION, SOLUTION INTRAMUSCULAR; INTRAVENOUS ONCE
Status: COMPLETED | OUTPATIENT
Start: 2020-02-12 | End: 2020-02-12

## 2020-02-12 RX ORDER — SODIUM CHLORIDE, SODIUM GLUCONATE, SODIUM ACETATE, POTASSIUM CHLORIDE, MAGNESIUM CHLORIDE, SODIUM PHOSPHATE, DIBASIC, AND POTASSIUM PHOSPHATE .53; .5; .37; .037; .03; .012; .00082 G/100ML; G/100ML; G/100ML; G/100ML; G/100ML; G/100ML; G/100ML
75 INJECTION, SOLUTION INTRAVENOUS CONTINUOUS
Status: DISCONTINUED | OUTPATIENT
Start: 2020-02-12 | End: 2020-02-12

## 2020-02-12 RX ADMIN — ATORVASTATIN CALCIUM 40 MG: 40 TABLET, FILM COATED ORAL at 18:38

## 2020-02-12 RX ADMIN — LEVETIRACETAM 500 MG: 500 TABLET, FILM COATED ORAL at 09:04

## 2020-02-12 RX ADMIN — FENTANYL CITRATE 50 MCG: 50 INJECTION, SOLUTION INTRAMUSCULAR; INTRAVENOUS at 11:39

## 2020-02-12 RX ADMIN — BUMETANIDE 2 MG: 0.25 INJECTION INTRAMUSCULAR; INTRAVENOUS at 18:10

## 2020-02-12 RX ADMIN — DEXAMETHASONE SODIUM PHOSPHATE 10 MG: 10 INJECTION, SOLUTION INTRAMUSCULAR; INTRAVENOUS at 13:10

## 2020-02-12 RX ADMIN — POTASSIUM CHLORIDE 20 MEQ: 14.9 INJECTION, SOLUTION INTRAVENOUS at 01:51

## 2020-02-12 RX ADMIN — HYDRALAZINE HYDROCHLORIDE 10 MG: 20 INJECTION INTRAMUSCULAR; INTRAVENOUS at 06:12

## 2020-02-12 RX ADMIN — DEXMEDETOMIDINE 0.4 MCG/KG/HR: 100 INJECTION, SOLUTION, CONCENTRATE INTRAVENOUS at 02:54

## 2020-02-12 RX ADMIN — SODIUM CHLORIDE 75 ML/HR: 0.9 INJECTION, SOLUTION INTRAVENOUS at 00:03

## 2020-02-12 RX ADMIN — FENTANYL CITRATE 50 MCG: 50 INJECTION, SOLUTION INTRAMUSCULAR; INTRAVENOUS at 16:24

## 2020-02-12 RX ADMIN — CHLORHEXIDINE GLUCONATE 0.12% ORAL RINSE 15 ML: 1.2 LIQUID ORAL at 09:04

## 2020-02-12 RX ADMIN — DEXAMETHASONE SODIUM PHOSPHATE 4 MG: 4 INJECTION, SOLUTION INTRAMUSCULAR; INTRAVENOUS at 18:35

## 2020-02-12 RX ADMIN — CEFAZOLIN SODIUM 1000 MG: 1 SOLUTION INTRAVENOUS at 00:00

## 2020-02-12 RX ADMIN — DEXMEDETOMIDINE 0.7 MCG/KG/HR: 100 INJECTION, SOLUTION, CONCENTRATE INTRAVENOUS at 20:07

## 2020-02-12 RX ADMIN — CHLORHEXIDINE GLUCONATE 0.12% ORAL RINSE 15 ML: 1.2 LIQUID ORAL at 20:14

## 2020-02-12 RX ADMIN — FENTANYL CITRATE 50 MCG: 50 INJECTION, SOLUTION INTRAMUSCULAR; INTRAVENOUS at 23:57

## 2020-02-12 RX ADMIN — FENTANYL CITRATE 50 MCG: 50 INJECTION, SOLUTION INTRAMUSCULAR; INTRAVENOUS at 04:30

## 2020-02-12 RX ADMIN — LEVOTHYROXINE SODIUM 112 MCG: 112 TABLET ORAL at 06:10

## 2020-02-12 RX ADMIN — POTASSIUM CHLORIDE 40 MEQ: 1.5 SOLUTION ORAL at 01:52

## 2020-02-12 RX ADMIN — DEXMEDETOMIDINE 0.7 MCG/KG/HR: 100 INJECTION, SOLUTION, CONCENTRATE INTRAVENOUS at 14:29

## 2020-02-12 RX ADMIN — LEVETIRACETAM 500 MG: 500 TABLET, FILM COATED ORAL at 20:14

## 2020-02-12 RX ADMIN — DEXMEDETOMIDINE 0.7 MCG/KG/HR: 100 INJECTION, SOLUTION, CONCENTRATE INTRAVENOUS at 03:44

## 2020-02-12 NOTE — RESPIRATORY THERAPY NOTE
02/12/20 1121   Respiratory Assessment   Assessment Type Assess only   General Appearance Awake   Respiratory Pattern Assisted   Chest Assessment Chest expansion symmetrical   Bilateral Breath Sounds Diminished;Clear   R Breath Sounds Diminished   L Breath Sounds Diminished   Cough None   Resp Comments pt placed on 5/5/60% by Dr Lauryn Betancourt, planning to extubated but patient has NO cuff leak at this time, plan to give patient steroid and assess for extubation later, placed on 10/5/50% per verbal order from Dr Lauryn Betancourt   O2 Device vent

## 2020-02-12 NOTE — RESPIRATORY THERAPY NOTE
02/12/20 1121   Vent Information   Vent type     Vent Mode CPAP/PS Spont   $ Pulse Oximetry Spot Check Charge Completed   CPAP/PS Spont Settings   FIO2 (%) 50 %   PEEP (cmH2O) 5 cmH2O   Pressure Support (cmH2O) 10 cmH20   Trigger Sensitivity Flow (lpm) -3 LPM   Rise Time (%) 50 %   Esens % 25 %   Humidification Heater   CPAP/PS Spont Actuals   Resp Rate (BPM) 15 BPM   VT (mL) 347 mL   MV (Obs) 5 53   MAP (cmH2O) 7 7 cmH2O   Peak Pressure (cmH2O) 16 cmH2O   I/E Ratio (Obs) 1/2 6   RSBI 41   Heater Temperature (Obs) 98 6 °F (37 °C)   CPAP/PS Spont Alarms   High Peak Pressure (cmH20) 40 cmH2O   High Resp Rate (BPM) 40 BPM   High MV (L/min) 18 L/min   Low MV (L/min) 4 L/min   High Patricia VTE (mL) 1000 mL   Low Patricia VTE (mL) 400 mL   High SPONT VTE (mL) 1000 mL   Low Spont VTE (mL) 200 mL   CPAP/PS Spont Apnea Settings   Resp Rate (BPM) 12 BPM   VT (mL) 500 mL   FIO2 (%) 100 %   Apnea Time (s) 20 S   Apnea Flow (LPM) 60 LPM   Maintenance   Alarm (pink) cable attached Yes   Resuscitation bag with peep valve at bedside Yes   Water bag changed No   Circuit changed No   IHI Ventilator Associated Pneumonia Bundle   Daily Assessment of Readiness to Extubate Not applicable (Comment)   Head of Bed Elevated HOB 30

## 2020-02-12 NOTE — PROGRESS NOTES
Daily Progress Note - Critical Care   Divya Del Cid [de-identified] y o  female MRN: [id number]  Unit/Bed#: ICU 02 Encounter: [id number]    ----------------------------------------------------------------------------------------  HPI/24hr events:  79-year-old female w/ PMH HTN , WENDI , HF, GERD, arthritis, anxiety p/w AMS, N/V, HA to [facility] 2/10  Patient lives at home with son and daughter-in-law, was described to be in her normal state during the morning but off from baseline by evening  CTH in the ED showed right parietal/temporal ICH with mass effect; patient's exam noted to have mild right-sided weakness and she was able to converse w/o focal findings  Patient transferred to [facility] for critical care neurosurgical management, given 25 g mannitol en route  Encephalopathy improved on admission, patient was awake and alert and answered questions appropriately  She was admitted and continued on the [address] stroke pathway as follows:  Neuro checks q 1 hour, SBP goals 130-150, Cardene, CTH a m , possible osmolar therapy with mannitol versus 3% saline, neurosurgery and Neurology consulted and aware, echo, lipid, A1c, MRI, CTA w/o vascular abnormality identified, stat repeat imaging w/ new focality or decrease in GCS by greater than 2 points  ICH volume approximately 29 mL, ICH score on admission 1   GCS on admission 15    Critical care course:  2/11 emergent decompressive craniectomy and evacuation of intraparenchymal hematoma (worsening exam 0330 --> repeat CTH showed increased IPH w/ early signs of uncal herniation  2/11 pm: patient returned to the ICU moving all 4 extremities volitionally but not to commands, VSS, goals per Neurosurgery, VIRGINIE drain to suction, Codman intracranial post-craniotomy ICP monitor placed, calibrated @ 499  ---------------------------------------------------------------------------------------  SUBJECTIVE  Patient on fentanyl during exam this morning but per overnight nursing was responding to commands and has good strength BUE/BLE - patient GCS 8T, 2E, 1VT, 5M    Review of Systems   Unable to perform ROS: Intubated   Patient also on 25 fentanyl this am during exam, not compliant w/ questions  ---------------------------------------------------------------------------------------  Assessment and Plan:    Neuro:    Diagnosis:  ICH  o CTH:  Acute ICH in the right temporal lobe with mild mass effect but no significant midline shift or herniation, small chronic right frontal subdural collection 2 mm  o CTA:  Large acute right temporal intraparenchymal hemorrhage with mass effect on the right lateral ventricle, minimal to mild right-to-left midline shift, no focal stenosis or saccular aneurysm within the [place], no hemodynamically significant stenosis in either ICA, less than 50% stenosis by NASCET criteria  o Plan:   - Atorvastatin 40 mg qhs  - Keppra 500 mg q12h  - Q 1 hour neuro checks (adjusted postoperatively)  - Cardene/lali  - Goal SBP < 140  - Neurology and neurosurgery following, appreciate recommendations  - F/u imaging and stroke workup  · PAIN/DELIRIUM/AGITATION:  ? Plan:  ? Fentanyl 25 prn, propofol (off this am), precedex 400 (@ 0 4 this am)  ? CAM-ICU  ? 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 Most recent Echo LVEF 55-60% with no regional wall motion abnormalities  ? Home meds Bumex 2 mg daily with b i d  Dosing on Monday Wednesday Friday  ? Plan:    ? Cardene 25 to goals as above  ? F/u echo and stroke pathway workup  ? Labetalol prn  · Diagnosis:   HLD  ? Plan:   ? F/u labs  · Diagnosis:  HTN  ? Plan:    ? On admission Cardene drip for blood pressure goals 130-150   ? No drips postop, consider restarting home meds when stabilized     Pulm:  · Diagnosis:  WENDI on BiPAP  ? Plan:  ? As below  · Diagnosis: intubated   ? Plan:    ? Hold BiPAP pending extubation  ?  Wean ventilator, extubate     GI: · Diagnosis:   GERD   ? Plan:  continue protonix 40 mg qd     :   · Diagnosis:   no acute issues     F/E/N:   · Fluids:   fluid started per Neurosurgery  · NaCl 0 9% on @ 75 since OR 2/11  · I&Os, monitor weights  · Electrolytes:     · Monitor electrolytes, replete as necessary  · Nutrition:     · Plan to extubate today, f/u w/ nutrition recommendations     Heme/Onc:   · Diagnosis:   no acute issues  ? Plan:     ? DVT prophylaxis held given IPH     Endo:   · Diagnosis:   history of hypothyroidism  ? Plan:    ? Home levothyroxine 112 mcg     ID:   · Diagnosis:   no acute issues  ? Plan:    ? Follow-up CBC  ? Monitor temps     MSK/Skin:   · Frequent repositioning  · PT/OT  · Consider PMR consult when appropriate      Disposition: Continue Critical Care   Code Status: Level 2 - DNAR: but accepts endotracheal intubation  ---------------------------------------------------------------------------------------  ICU CORE MEASURES    Prophylaxis   VTE Pharmacologic Prophylaxis: Pharmacologic VTE Prophylaxis contraindicated due to Intraparenchymal hemorrhage  VTE Mechanical Prophylaxis: sequential compression device  Stress Ulcer Prophylaxis: Pantoprazole PO    ABCDE Protocol (if indicated)  Plan to perform spontaneous awakening trial today? Yes  Plan to perform spontaneous breathing trial today? No  Obvious barriers to extubation? Yes  CAM-ICU: Positive    Invasive Devices Review  Invasive Devices     Peripheral Intravenous Line            Peripheral IV 02/12/20 Left Forearm less than 1 day    Peripheral IV 02/12/20 Right Antecubital less than 1 day          Arterial Line            Arterial Line 02/11/20 Radial less than 1 day          Drain            Closed/Suction Drain Right Head Bulb less than 1 day    ICP Device ICP microsensor fiber Right Frontal region less than 1 day    NG/OG/Enteral Tube Orogastric Center mouth less than 1 day    Urethral Catheter Non-latex; Temperature probe 14 Fr  less than 1 day Ventriculostomy/Subdural Subdural drainage catheter Right Temporal region less than 1 day          Airway            ETT  Cuffed; Hi-Lo 7 mm less than 1 day              Can any invasive devices be discontinued today? No  ---------------------------------------------------------------------------------------  OBJECTIVE    Vitals     Vitals:    20 0600 20 0630 20 0700 20 0705   BP:       Pulse: (!) 50 (!) 50 (!) 50    Resp:  12    Temp: 97 9 °F (36 6 °C) 97 9 °F (36 6 °C) 97 9 °F (36 6 °C)    TempSrc:       SpO2: 98% 98% 95% 95%   Weight:       Height:         Temp (24hrs), Av 7 °F (37 1 °C), Min:97 5 °F (36 4 °C), Max:99 3 °F (37 4 °C)  Current: Temperature: 97 9 °F (36 6 °C)    Physical Exam   Constitutional: She appears well-developed and well-nourished  No distress  sedated   HENT:   Right Ear: External ear normal    Left Ear: External ear normal    Mouth/Throat: Oropharynx is clear and moist  No oropharyngeal exudate  S/p right craniectomy, VIRGINIE drain to suction  serosanguinous to red, subdural to gravity bloody   Eyes: Pupils are equal, round, and reactive to light  Right eye exhibits no discharge  Left eye exhibits no discharge  No scleral icterus  Neck: No JVD present  No tracheal deviation present  Cardiovascular: Regular rhythm and intact distal pulses  Exam reveals no friction rub  No murmur heard  Sinus bradycardia on precedex, heart sounds distant    Pulmonary/Chest: No stridor  No respiratory distress  She has no wheezes  She has no rales  She exhibits tenderness (grimace on exam)  Intubated and sedated   Abdominal: She exhibits distension and mass (possible RLQ area of increased distension)  Abdomen obese, mildly distended, soft bowel sounds   Genitourinary:   Genitourinary Comments: Carlson draining clear yellow urine   Musculoskeletal: She exhibits tenderness (BLE)  She exhibits no edema     Neurological: She displays abnormal reflex (mildlu suppressed BUE compared to yesterday)  She exhibits abnormal muscle tone (not movement to commands or noxious stimuli LUE)  Intubated and sedated, frowns/responds to noxious stimuli RUE and BLE, BUT NOT LUE   Skin: Skin is warm and dry  She is not diaphoretic     Varicosities BLE, pulses intact       Respiratory:  SpO2: SpO2: 95 %, SpO2 Activity: SpO2 Activity: At Rest, SpO2 Device: O2 Device: Other (comment)(vent), Capnography:    O2 Flow Rate (L/min): 3 L/min    Invasive/non-invasive ventilation settings   Respiratory    Lab Data (Last 4 hours)      02/12 0626            pH, Arterial       7 485           pCO2, Arterial       33 8           pO2, Arterial       84 0           HCO3, Arterial       24 9           Base Excess, Arterial       1 7                O2/Vent Data           Most Recent         Vent Mode   AC/VC      Resp Rate (BPM) (BPM)   12      Vt (mL) (mL)   500      FIO2 (%) (%)   60      PEEP (cmH2O) (cmH2O)   5      MV   6 54                Laboratory and Diagnostics:  Results from last 7 days   Lab Units 02/12/20  0626 02/11/20  1011 02/11/20  0806 02/10/20  2005   WBC Thousand/uL 15 18*  --  13 51* 11 26*   HEMOGLOBIN g/dL 10 0*  --  11 6 13 1   I STAT HEMOGLOBIN g/dl  --  9 9*  --   --    HEMATOCRIT % 30 2*  --  36 9 41 2   HEMATOCRIT, ISTAT %  --  29*  --   --    PLATELETS Thousands/uL 140*  --  182 193   NEUTROS PCT %  --   --  89* 88*   MONOS PCT %  --   --  5 4     Results from last 7 days   Lab Units 02/12/20  0626 02/11/20  2358 02/11/20  1011 02/11/20  0806 02/10/20  2005   SODIUM mmol/L 145 145  --  141 138   POTASSIUM mmol/L 3 9 3 2*  --  3 6 4 9   CHLORIDE mmol/L 112* 109*  --  106 102   CO2 mmol/L 27 28  --  31 26   CO2, I-STAT mmol/L  --   --  28  --   --    ANION GAP mmol/L 6 8  --  4 10   BUN mg/dL 25 22  --  21 24   CREATININE mg/dL 1 11 1 15  --  1 14 1 05   CALCIUM mg/dL 8 5 8 5  --  9 1 9 3   GLUCOSE RANDOM mg/dL 151* 150*  --  128 118   ALT U/L  --   --   --  19 26   AST U/L  --   --   -- 15 41   ALK PHOS U/L  --   --   --  132* 153*   ALBUMIN g/dL  --   --   --  3 1* 3 2*   TOTAL BILIRUBIN mg/dL  --   --   --  0 49 0 68     Results from last 7 days   Lab Units 02/12/20  0626 02/11/20  0604   MAGNESIUM mg/dL 2 1 2 0   PHOSPHORUS mg/dL 2 7 4 0      Results from last 7 days   Lab Units 02/10/20  2043   INR  1 13   PTT seconds 31      Results from last 7 days   Lab Units 02/10/20  2005   TROPONIN I ng/mL 0 05*         ABG:  Results from last 7 days   Lab Units 02/12/20 0626   PH ART  7 485*   PCO2 ART mm Hg 33 8*   PO2 ART mm Hg 84 0   HCO3 ART mmol/L 24 9   BASE EXC ART mmol/L 1 7   ABG SOURCE  Line, Arterial     VBG:  Results from last 7 days   Lab Units 02/12/20 0626   ABG SOURCE  Line, Arterial           Micro        EKG:   NSR, QT//480, right bundle branch block, T-wave abnormality consider inferior ischemia    Imaging:  CTH   Improvement in hemorrhage, pending official read    CT head wo contrast [[id number]] 02/11/20 0529   CT BRAIN - WITHOUT CONTRASTINDICATION:   Stroke, follow up  COMPARISON:  CT brain dated February 10, 2020 at 8:17 PM   FINDINGS:  PARENCHYMA:  Again noted is acute intraparenchymal hemorrhage involving the right temporal lobe with surrounding vasogenic edema and mass effect on the right lateral ventricular system   The size of this hematoma measures approximately 7 5 x 4 0 x 2 3 cm  (AP, transverse, cc), minimally increased in size from the prior exam   There is approximately 4 mm of right-to-left midline shift, stable from the prior exam   VENTRICLES AND EXTRA-AXIAL SPACES:  See above   No hydrocephalus  VISUALIZED ORBITS AND PARANASAL SINUSES:  Unremarkable  CALVARIUM AND EXTRACRANIAL SOFT TISSUES:  Normal    Impression:     Reidentified large acute intraparenchymal hemorrhage involving the right temporal lobe with mass effect on the right lateral ventricle and minimal to mild right to left midline shift   The size of this intraparenchymal hematoma has minimally increased from the prior exam  primarily in the AP dimension  CTA head and neck with and without contrast [[id number]]  02/10/20 2216   CTA NECK AND BRAIN WITH CONTRAST INDICATION: Neuro deficit, acute, stroke suspected acute intracranial hemorrhage  COMPARISON:   CT brain unenhanced performed approximately 1 hour prior  FINDINGS: Again noted is a large area of acute intracranial hemorrhage involving the right temporal lobe with mass effect on the right lateral ventricular system   There is approximately 5 mm of left-to-right midline shift, stable  CERVICAL VASCULATURE  AORTIC ARCH AND GREAT VESSELS:  Normal aortic arch and great vessel origins  Normal visualized subclavian vessels  RIGHT VERTEBRAL ARTERY CERVICAL SEGMENT:  Normal origin  The vessel is small in caliber throughout the neck  LEFT VERTEBRAL ARTERY CERVICAL SEGMENT:  Normal origin  The vessel is normal in caliber throughout the neck  RIGHT EXTRACRANIAL CAROTID SEGMENT:  Mild atherosclerotic disease of the distal common carotid artery and proximal cervical internal carotid artery without significant stenosis compared to the more distal ICA  LEFT EXTRACRANIAL CAROTID SEGMENT:  Mild atherosclerotic disease of the distal common carotid artery and proximal cervical internal carotid artery without significant stenosis compared to the more distal ICA  NASCET criteria was used to determine the degree of internal carotid artery diameter stenosis  INTRACRANIAL VASCULATURE   INTERNAL CAROTID ARTERIES:  Normal enhancement of the intracranial portions of the internal carotid arteries   There is atherosclerotic calcification of the bilateral cavernous internal carotid arteries   Normal ophthalmic artery origins   Normal ICA   terminus  ANTERIOR CIRCULATION:  Symmetric A1 segments and anterior cerebral arteries with normal enhancement   Normal anterior communicating artery    MIDDLE CEREBRAL ARTERY CIRCULATION:  M1 segment and middle cerebral artery branches demonstrate normal enhancement bilaterally  DISTAL VERTEBRAL ARTERIES: Radha Mejias is a dominant distal left vertebral artery   Posterior inferior cerebellar artery origins are normal  Normal vertebral basilar junction  BASILAR ARTERY:  Basilar artery is normal in caliber   Normal superior cerebellar arteries  POSTERIOR CEREBRAL ARTERIES: Both posterior cerebral arteries arises from the basilar tip   Both arteries demonstrate normal enhancement    Normal posterior communicating arteries  DURAL VENOUS SINUSES:  Normal   NON VASCULAR ANATOMY  BONY STRUCTURES:  No acute osseous abnormality  SOFT TISSUES OF THE NECK:  Normal  THORACIC INLET: Radha Mejias is a partially visualized left pleural effusion  Radha Mejias is interstitial prominence noted at both lung apices with scattered areas of groundglass density suspicious for mild pulmonary vascular congestion  Impression:     Reidentified large acute right temporal intraparenchymal hemorrhage with mass effect on the right lateral ventricular system and minimal to mild right to left midline shift  No focal stenosis or saccular aneurysm within the [place]  No hemodynamically significant stenosis within either common or internal carotid artery   Less than 50% stenosis by NASCET criteria  Partially visualized left pleural effusion  XR chest 1 view portable [[id number]] 02/11/20 0802   CHEST INDICATION:   htn  COMPARISON:  February 26, 2019  FINDINGS:  Top normal heart size   Calcified plaque within the arch   Mild central pulmonary vascular distention  The lungs are clear   No pneumothorax or pleural effusion  Osseous structures appear within normal limits for patient age  Impression:     Stable compared to prior  No acute cardiopulmonary disease        CT head without contrast [[id number]]  02/10/20 2025   CT BRAIN - WITHOUT CONTRAST  INDICATION:   Headache, acute, normal neuro exam   [de-identified]year-old female with elevated blood pressure, headache, nausea and vomiting  FINDINGS:  PARENCHYMA:  No intracranial mass  No CT signs of acute infarction   6 3 x 3 5 x 2 5 cm acute hemorrhage in the right temporal lobe with surrounding edema, effacement of the overlying cortical sulci and of the right sylvian fissure   There is mild mass   effect and compression of the right lateral ventricle   However, there is no midline shift and no subfalcine or uncal herniation   Hemorrhage volume approximately 29 mL  VENTRICLES AND EXTRA-AXIAL SPACES:  Narrowing of the right lateral ventricle, as mentioned above   Left lateral ventricle and 3rd and 4th ventricles unremarkable and unchanged from the CT from 2016   Mild effacement of the right side of the   perimesencephalic cistern   Basilar cisterns otherwise unremarkable   Suggestion of small chronic right frontal subdural collection, 2 mm in maximal thickness with no mass effect (series 2, image 21; series 400, images 29-37)  VISUALIZED ORBITS AND PARANASAL SINUSES:  Unremarkable  CALVARIUM AND EXTRACRANIAL SOFT TISSUES:  Normal    Impression:     1   6 3 x 3 5 x 2 5 cm acute intracerebral hemorrhage in the right temporal lobe with mild mass effect but no midline shift or herniation  2   Probable small chronic right frontal subdural collection, 2 mm in thickness  Intake and Output      Intake/Output Summary (Last 24 hours) at 2/12/2020 0749  Last data filed at 2/12/2020 0600  Gross per 24 hour   Intake 3548 88 ml   Output 2950 ml   Net 598 88 ml     I/O       02/10 0701 - 02/11 0700 02/11 0701 - 02/12 0700    I V  (mL/kg) 501 5 (5 3) 2704 3 (28 5)    NG/GT  90    IV Piggyback  700    Total Intake(mL/kg) 501 5 (5 3) 3494 3 (36 9)    Urine (mL/kg/hr) 1000 2265 (1)    Emesis/NG output  100    Drains  140    Blood  150    Total Output 1000 2655    Net -498 5 +839 3              Height and Weights   Height: 5' 1" (154 9 cm)  IBW: 47 8 kg  Body mass index is 39 49 kg/m²    Weight (last 2 days)     Date/Time   Weight    02/11/20 0542 94 8 (209)    02/11/20 0600   94 8 (209)    02/10/20 2336   94 8 (209)    02/10/20 2300   94 8 (209)            Nutrition       Diet Orders   (From admission, onward)             Start     Ordered    02/11/20 1224  Diet NPO  Diet effective now     Question Answer Comment   Diet Type NPO    RD to adjust diet per protocol?  Yes        02/11/20 1223              If can extubate may give diet    Active Medications  Scheduled Meds:    Current Facility-Administered Medications:  atorvastatin 40 mg Oral QPM Ana Cruz MD    bisacodyl 10 mg Rectal Daily PRN Oliver Loja MD    chlorhexidine 15 mL Swish & Spit Q12H [address] Ana Cruz MD    dexmedetomidine 0 1-0 7 mcg/kg/hr Intravenous Titrated Dayday Marinelli MD Last Rate: 0 4 mcg/kg/hr (02/12/20 4594)   docusate sodium 100 mg Oral BID Oliver Loja MD    fentaNYL 25 mcg/hr Intravenous Continuous Oliver Loja MD Last Rate: 25 mcg/hr (02/12/20 9591)   fentanyl citrate (PF) 50 mcg Intravenous Q1H PRN Dayday Marinelli MD    fentanyl citrate (PF) 50 mcg Intravenous Q1H PRN Dmitry Queen DO    Labetalol HCl 10 mg Intravenous Q4H PRN Dayday Marinelli MD    levETIRAcetam 500 mg Oral Q12H Dotty Bhagat MD    levothyroxine 112 mcg Oral Early Morning Ana Cruz MD    niCARdipine 1-15 mg/hr Intravenous Titrated Neela Noe DO Last Rate: Stopped (02/11/20 0631)   ondansetron 4 mg Intravenous Q4H PRN Oliver Loja MD    pantoprazole 40 mg Oral Early Morning Ana Cruz MD    propofol 5-50 mcg/kg/min Intravenous Titrated Oliver Loja MD Last Rate: Stopped (02/11/20 2030)   sodium chloride 75 mL/hr Intravenous Continuous Oliver Loja MD Last Rate: 75 mL/hr (02/12/20 0003)     Continuous Infusions:    dexmedetomidine 0 1-0 7 mcg/kg/hr Last Rate: 0 4 mcg/kg/hr (02/12/20 6337)   fentaNYL 25 mcg/hr Last Rate: 25 mcg/hr (02/12/20 0613)   niCARdipine 1-15 mg/hr Last Rate: Stopped (02/11/20 0631)   propofol 5-50 mcg/kg/min Last Rate: Stopped (02/11/20 2030)   sodium chloride 75 mL/hr Last Rate: 75 mL/hr (02/12/20 0003)     PRN Meds:     bisacodyl 10 mg Daily PRN   fentanyl citrate (PF) 50 mcg Q1H PRN   fentanyl citrate (PF) 50 mcg Q1H PRN   Labetalol HCl 10 mg Q4H PRN   ondansetron 4 mg Q4H PRN     Allergies   No Known Allergies  ---------------------------------------------------------------------------------------  Advance Directive and Living Will:      Power of :    POLST:    ---------------------------------------------------------------------------------------    [id number] [employer] Student, Class 2020  [id number] at [address] [place] [address]    Portions of the record may have been created with voice recognition software  Occasional wrong word or "sound a like" substitutions may have occurred due to the inherent limitations of voice recognition software    Read the chart carefully and recognize, using context, where substitutions have occurred

## 2020-02-12 NOTE — RESPIRATORY THERAPY NOTE
RT Ventilator Management Note  Tesfaye Montoya [de-identified] y o  female MRN: [id number]  Unit/Bed#: ICU 02 Encounter: [id number]      Daily Screen       2/11/2020  1204             Patient safety screen outcome[de-identified]  Failed    Not Ready for Weaning due to[de-identified]              Physical Exam:   Assessment Type: Assess only  General Appearance: Awake  Respiratory Pattern: Assisted  Chest Assessment: Chest expansion symmetrical  Bilateral Breath Sounds: Diminished, Clear  R Breath Sounds: Diminished  L Breath Sounds: Diminished  Cough: None  Suction: ET Tube  O2 Device: vent      Resp Comments: pt placed on 5/5/60% by Dr Bernardino Allen, planning to extubated but patient has NO cuff leak at this time, plan to give patient steroid and assess for extubation later, placed on 10/5/50% per verbal order from Dr Bernardino Allen

## 2020-02-12 NOTE — SOCIAL WORK
Pt intubated  Son, Ct Harris, and Venancio Oconee at bedside  CM introduced self/role with dcp  Pt resides with her son, LUDWIG, and adult grandson in a ranch home with 2 KUMAR (with rails on both sides)  Pt has first floor set up  Laundry on first floor  Pt independent with ADLs and using a RW for ambulation  Pt has a shower chair  Pt retired  Able to drive - short distances otherwise family transports  Pt has no prior hx of VNA, STR, MH, or drug/alcohol abuse  Pharmacy is [facility] in [address] [place] [address] for short term and [facility] order for long term  Ct Harris reports his wife, Laine Bishop, and himself are pt POA  Pt also has LW  Ct Harris and his wife both work full time  Their son, Kirill Cheema, is currently taking on-line course and home during the day with pt  However, as per family this may change depending on his school schedule  CM to follow for dcp  CM reviewed d/c planning process including the following: identifying help at home, patient preference for d/c planning needs, Discharge Lounge, [facility] Meds to Bed program, availability of treatment team to discuss questions or concerns patient and/or family may have regarding understanding medications and recognizing signs and symptoms once discharged  CM also encouraged patient to follow up with all recommended appointments after discharge  Patient advised of importance for patient and family to participate in managing patients medical well being

## 2020-02-12 NOTE — ASSESSMENT & PLAN NOTE
POD2 Right decompressive craniectomy and evacuation of intraparenchymal hematoma   · ICH 1 on initial presentation, ICH 2 prior to OR with repeat CT head (increase in volume)  · Patient with exam decline, leading to surgical decompression  · Patient intubated, on fentanyl and precedex  Failed extubation wean yesterday - no cuff leak  Retry today   · GCS 10T (E3, V1, M6) - Exam improved today  · Codman ICP monitor (0-4), VIRGINIE drain (200CC), SDD to 0mmHg (55cc)    Imaging:  · CT head w/o, 2/11/20: acute intraparenchymal hemorrhage involving the right temporal lobe with surrounding vasogenic edema and mass effect on the right lateral ventricular system  The size of this hematoma measures approximately 7 5 x 4 0 x 2 3 cm, minimally increased in size from the prior exam   There is approximately 4 mm of right-to-left midline shift, stable from the prior exam   · CT head w/o, 2/12/20: Interval postoperative changes of right-sided decompressive craniectomy and evacuation of a large right temporal lobe intraparenchymal hematoma  Interval decrease in mass effect on the right lateral ventricle  No midline shift  Plan:  · Continue to monitor drain outputs  · Monitor ICPs (0-4 today, 0 in room)  · Keppra 500mg BID x7 days  · SBP <140  · PT/OT/SLP  · Neurology following for stroke workup  · Wean to extubate when able; re-attempt extubation today  · Decadron for presumed airway edema  · Pain control per primary team  · DVT ppx: SCDs, ok to start [facility]  · Patient will need helmet when extubated    Neurosurgery will continue to follow at this time  Please don't hesitate to contact us with any additional questions

## 2020-02-12 NOTE — QUICK NOTE
Reviewed chart, patient on stroke pathway  Attempted to meet with patient to discuss follow up care, stroke education, and discharge planning  Patient intubated/sedated  No family present  Will continue to follow and meet when more stable or family available  Left stroke education binder and my card at bedside

## 2020-02-12 NOTE — ASSESSMENT & PLAN NOTE
POD1 Right decompressive craniectomy and evacuation of intraparenchymal hematoma   · ICH 1 on initial presentation, ICH 2 prior to OR with repeat CT head (increase in volume)  · Patient with exam decline, leading to surgical decompression  · Patient intubated, on fentanyl and precedex  · GCS 8T (E1, V1, M6) - Patient unwilling to open eyes to sternal rub (shakes head no), but will for other people   · Codman ICP monitor, VIRGINIE drain, SDD to [facility]    Imaging:  · CT head w/o, 2/11/20: acute intraparenchymal hemorrhage involving the right temporal lobe with surrounding vasogenic edema and mass effect on the right lateral ventricular system  The size of this hematoma measures approximately 7 5 x 4 0 x 2 3 cm, minimally increased in size from the prior exam   There is approximately 4 mm of right-to-left midline shift, stable from the prior exam   · CT head w/o, 2/12/20: Interval postoperative changes of right-sided decompressive craniectomy and evacuation of a large right temporal lobe intraparenchymal hematoma  Interval decrease in mass effect on the right lateral ventricle  No midline shift  Plan:  · Continue to monitor drain outputs  · Monitor ICPs (2-4 today, 4 in room)  · Keppra 500mg BID x7 days  · SBP <140  · PT/OT/SLP  · Neurology following for stroke workup  · Wean to extubate when able  · Pain control per primary team  · DVT ppx: SCDs, hold pharm ppx in setting of acute IPH    Neurosurgery will continue to follow at this time  Please don't hesitate to contact us with any additional questions

## 2020-02-12 NOTE — PROGRESS NOTES
Patient seen and examined personally    She is described as being "feisty" today      She is moving all 4's    She has no cuff leak on extubation trials and so remains intubated on Dex for sedation    Given Decadron for presumed airway edema    Extubation likely in AM    CT much improved with expected post op changes

## 2020-02-12 NOTE — OCCUPATIONAL THERAPY NOTE
OT CANCEL NOTE    OT orders received  Chart reviewed  Pt is currently intubated/sedated and not appropriate to engage in skilled OT services at this time  Will hold initial OT evaluation  Will continue to follow pt on caseload and see pt when medically stable and as clinically appropriate      Jocelyne DOCKERY, OTR/L

## 2020-02-12 NOTE — RESPIRATORY THERAPY NOTE
RT Ventilator Management Note  Melba Patel [de-identified] y o  female MRN: [id number]  Unit/Bed#: ICU 02 Encounter: [id number]      Daily Screen       2/11/2020  1204             Patient safety screen outcome[de-identified]  Failed    Not Ready for Weaning due to[de-identified]              Physical Exam:   Assessment Type: Assess only  General Appearance: Sedated  Respiratory Pattern: Assisted  Chest Assessment: Chest expansion symmetrical  Bilateral Breath Sounds: Coarse(slightly)  L Breath Sounds: Diminished  Suction: (P) ET Tube  O2 Device: (P) vent      Resp Comments: (P) pt cont to william current AC mode, no distress noted no changes at this time will cont to monitor

## 2020-02-12 NOTE — PROGRESS NOTES
02/12/20 1200   Spiritual Beliefs/Perceptions   Support Systems Children   Plan of Care   Comments Consulted with nursing staff about pt  no family presence

## 2020-02-12 NOTE — RESPIRATORY THERAPY NOTE
RT Ventilator Management Note  Blair Padilla [de-identified] y o  female MRN: [id number]  Unit/Bed#: ICU 02 Encounter: [id number]      Daily Screen       2/11/2020  1204             Patient safety screen outcome[de-identified]  Failed    Not Ready for Weaning due to[de-identified]              Physical Exam:   Assessment Type: Assess only  General Appearance: Sedated  Respiratory Pattern: Assisted  Chest Assessment: Chest expansion symmetrical  Bilateral Breath Sounds: Clear  L Breath Sounds: Diminished  Suction: ET Tube(PRN)      Resp Comments: Pt sedated  Continues on AC/VC mode  Not breathing over set rate  BS clear,  diminished L  Plan to continue on full vent support

## 2020-02-12 NOTE — PLAN OF CARE
Problem: SAFETY,RESTRAINT: NV/NON-SELF DESTRUCTIVE BEHAVIOR  Goal: Remains free of harm/injury (restraint for non violent/non self-detsructive behavior)  Description  INTERVENTIONS:  - Instruct patient/family regarding restraint use   - Assess and monitor physiologic and psychological status   - Provide interventions and comfort measures to meet assessed patient needs   - Identify and implement measures to help patient regain control  - Assess readiness for release of restraint   Outcome: Progressing     Problem: SAFETY,RESTRAINT: NV/NON-SELF DESTRUCTIVE BEHAVIOR  Goal: Returns to optimal restraint-free functioning  Description  INTERVENTIONS:  - Assess the patient's behavior and symptoms that indicate continued need for restraint  - Identify and implement measures to help patient regain control  - Assess readiness for release of restraint   Outcome: Progressing

## 2020-02-12 NOTE — PLAN OF CARE
Problem: Potential for Falls  Goal: Patient will remain free of falls  Description  INTERVENTIONS:  - Assess patient frequently for physical needs  -  Identify cognitive and physical deficits and behaviors that affect risk of falls    -  [place] fall precautions as indicated by assessment   - Educate patient/family on patient safety including physical limitations  - Instruct patient to call for assistance with activity based on assessment  - Modify environment to reduce risk of injury  - Consider OT/PT consult to assist with strengthening/mobility  Outcome: Progressing     Problem: Prexisting or High Potential for Compromised Skin Integrity  Goal: Skin integrity is maintained or improved  Description  INTERVENTIONS:  - Identify patients at risk for skin breakdown  - Assess and monitor skin integrity  - Assess and monitor nutrition and hydration status  - Monitor labs   - Assess for incontinence   - Turn and reposition patient  - Assist with mobility/ambulation  - Relieve pressure over bony prominences  - Avoid friction and shearing  - Provide appropriate hygiene as needed including keeping skin clean and dry  - Evaluate need for skin moisturizer/barrier cream  - Collaborate with interdisciplinary team   - Patient/family teaching  - Consider wound care consult   Outcome: Progressing     Problem: NEUROSENSORY - ADULT  Goal: Achieves stable or improved neurological status  Description  INTERVENTIONS  - Monitor and report changes in neurological status  - Monitor vital signs such as temperature, blood pressure, glucose, and any other labs ordered   - Initiate measures to prevent increased intracranial pressure  - Monitor for seizure activity and implement precautions if appropriate      Outcome: Progressing  Goal: Remains free of injury related to seizures activity  Description  INTERVENTIONS  - Maintain airway, patient safety  and administer oxygen as ordered  - Monitor patient for seizure activity, document and report duration and description of seizure to physician/advanced practitioner  - If seizure occurs,  ensure patient safety during seizure  - Reorient patient post seizure  - Seizure pads on all 4 side rails  - Instruct patient/family to notify RN of any seizure activity including if an aura is experienced  - Instruct patient/family to call for assistance with activity based on nursing assessment  - Administer anti-seizure medications if ordered    Outcome: Progressing  Goal: Achieves maximal functionality and self care  Description  INTERVENTIONS  - Monitor swallowing and airway patency with patient fatigue and changes in neurological status  - Encourage and assist patient to increase activity and self care     - Encourage visually impaired, hearing impaired and aphasic patients to use assistive/communication devices  Outcome: Progressing     Problem: CARDIOVASCULAR - ADULT  Goal: Maintains optimal cardiac output and hemodynamic stability  Description  INTERVENTIONS:  - Monitor I/O, vital signs and rhythm  - Monitor for S/S and trends of decreased cardiac output  - Administer and titrate ordered vasoactive medications to optimize hemodynamic stability  - Assess quality of pulses, skin color and temperature  - Assess for signs of decreased coronary artery perfusion  - Instruct patient to report change in severity of symptoms  Outcome: Progressing  Goal: Absence of cardiac dysrhythmias or at baseline rhythm  Description  INTERVENTIONS:  - Continuous cardiac monitoring, vital signs, obtain 12 lead EKG if ordered  - Administer antiarrhythmic and heart rate control medications as ordered  - Monitor electrolytes and administer replacement therapy as ordered  Outcome: Progressing     Problem: RESPIRATORY - ADULT  Goal: Achieves optimal ventilation and oxygenation  Description  INTERVENTIONS:  - Assess for changes in respiratory status  - Assess for changes in mentation and behavior  - Position to facilitate oxygenation and minimize respiratory effort  - Oxygen administered by appropriate delivery if ordered  - Initiate smoking cessation education as indicated  - Encourage broncho-pulmonary hygiene including cough, deep breathe, Incentive Spirometry  - Assess the need for suctioning and aspirate as needed  - Assess and instruct to report SOB or any respiratory difficulty  - Respiratory Therapy support as indicated  Outcome: Progressing     Problem: GASTROINTESTINAL - ADULT  Goal: Minimal or absence of nausea and/or vomiting  Description  INTERVENTIONS:  - Administer IV fluids if ordered to ensure adequate hydration  - Maintain NPO status until nausea and vomiting are resolved  - Nasogastric tube if ordered  - Administer ordered antiemetic medications as needed  - Provide nonpharmacologic comfort measures as appropriate  - Advance diet as tolerated, if ordered  - Consider nutrition services referral to assist patient with adequate nutrition and appropriate food choices  Outcome: Progressing  Goal: Maintains or returns to baseline bowel function  Description  INTERVENTIONS:  - Assess bowel function  - Encourage oral fluids to ensure adequate hydration  - Administer IV fluids if ordered to ensure adequate hydration  - Administer ordered medications as needed  - Encourage mobilization and activity  - Consider nutritional services referral to assist patient with adequate nutrition and appropriate food choices  Outcome: Progressing  Goal: Maintains adequate nutritional intake  Description  INTERVENTIONS:  - Monitor percentage of each meal consumed  - Identify factors contributing to decreased intake, treat as appropriate  - Assist with meals as needed  - Monitor I&O, weight, and lab values if indicated  - Obtain nutrition services referral as needed  Outcome: Progressing     Problem: GENITOURINARY - ADULT  Goal: Maintains or returns to baseline urinary function  Description  INTERVENTIONS:  - Assess urinary function  - Encourage oral fluids to ensure adequate hydration if ordered  - Administer IV fluids as ordered to ensure adequate hydration  - Administer ordered medications as needed  - Offer frequent toileting  - Follow urinary retention protocol if ordered  Outcome: Progressing     Problem: METABOLIC, FLUID AND ELECTROLYTES - ADULT  Goal: Electrolytes maintained within normal limits  Description  INTERVENTIONS:  - Monitor labs and assess patient for signs and symptoms of electrolyte imbalances  - Administer electrolyte replacement as ordered  - Monitor response to electrolyte replacements, including repeat lab results as appropriate  - Instruct patient on fluid and nutrition as appropriate  Outcome: Progressing  Goal: Fluid balance maintained  Description  INTERVENTIONS:  - Monitor labs   - Monitor I/O and WT  - Instruct patient on fluid and nutrition as appropriate  - Assess for signs & symptoms of volume excess or deficit  Outcome: Progressing  Goal: Glucose maintained within target range  Description  INTERVENTIONS:  - Monitor Blood Glucose as ordered  - Assess for signs and symptoms of hyperglycemia and hypoglycemia  - Administer ordered medications to maintain glucose within target range  - Assess nutritional intake and initiate nutrition service referral as needed  Outcome: Progressing     Problem: SKIN/TISSUE INTEGRITY - ADULT  Goal: Skin integrity remains intact  Description  INTERVENTIONS  - Identify patients at risk for skin breakdown  - Assess and monitor skin integrity  - Assess and monitor nutrition and hydration status  - Monitor labs (i e  albumin)  - Assess for incontinence   - Turn and reposition patient  - Assist with mobility/ambulation  - Relieve pressure over bony prominences  - Avoid friction and shearing  - Provide appropriate hygiene as needed including keeping skin clean and dry  - Evaluate need for skin moisturizer/barrier cream  - Collaborate with interdisciplinary team (i e  Nutrition, Rehabilitation, etc )   - Patient/family teaching  Outcome: Progressing     Problem: HEMATOLOGIC - ADULT  Goal: Maintains hematologic stability  Description  INTERVENTIONS  - Assess for signs and symptoms of bleeding or hemorrhage  - Monitor labs  - Administer supportive blood products/factors as ordered and appropriate  Outcome: Progressing     Problem: MUSCULOSKELETAL - ADULT  Goal: Maintain or return mobility to safest level of function  Description  INTERVENTIONS:  - Assess patient's ability to carry out ADLs; assess patient's baseline for ADL function and identify physical deficits which impact ability to perform ADLs (bathing, care of mouth/teeth, toileting, grooming, dressing, etc )  - Assess/evaluate cause of self-care deficits   - Assess range of motion  - Assess patient's mobility  - Assess patient's need for assistive devices and provide as appropriate  - Encourage maximum independence but intervene and supervise when necessary  - Involve family in performance of ADLs  - Assess for home care needs following discharge   - Consider OT consult to assist with ADL evaluation and planning for discharge  - Provide patient education as appropriate  Outcome: Progressing     Problem: Neurological Deficit  Goal: Neurological status is stable or improving  Description  Interventions:  - Monitor and assess patient's level of consciousness, motor function, sensory function, and level of assistance needed for ADLs  - Monitor and report changes from baseline  Collaborate with interdisciplinary team to initiate plan and implement interventions as ordered  - Provide and maintain a safe environment  - Consider seizure precautions  - Consider fall precautions  - Consider aspiration precautions  - Consider bleeding precautions  Outcome: Progressing     Problem:  Activity Intolerance/Impaired Mobility  Goal: Mobility/activity is maintained at optimum level for patient  Description  Interventions:  - Assess and monitor patient  barriers to mobility and need for assistive/adaptive devices  - Assess patient's emotional response to limitations  - Collaborate with interdisciplinary team and initiate plans and interventions as ordered  - Encourage independent activity per ability   - Maintain proper body alignment  - Perform active/passive rom as tolerated/ordered  - Plan activities to conserve energy   - Turn patient as appropriate  Outcome: Progressing     Problem: Communication Impairment  Goal: Ability to express needs and understand communication  Description  Assess patient's communication skills and ability to understand information  Patient will demonstrate use of effective communication techniques, alternative methods of communication and understanding even if not able to speak  - Encourage communication and provide alternate methods of communication as needed  - Collaborate with case management/ for discharge needs  - Include patient/family/caregiver in decisions related to communication  Outcome: Progressing     Problem: Potential for Aspiration  Goal: Non-ventilated patient's risk of aspiration is minimized  Description  Assess and monitor vital signs, respiratory status, and labs (WBC)  Monitor for signs of aspiration (tachypnea, cough, rales, wheezing, cyanosis, fever)  - Assess and monitor patient's ability to swallow  - Place patient up in chair to eat if possible  - HOB up at 90 degrees to eat if unable to get patient up into chair   - Supervise patient during oral intake  - Instruct patient/ family to take small bites  - Instruct patient/ family to take small single sips when taking liquids  - Follow patient-specific strategies generated by speech pathologist   Outcome: Progressing  Goal: Ventilated patient's risk of aspiration is minimized  Description  Assess and monitor vital signs, respiratory status, airway cuff pressure, and labs (WBC)    Monitor for signs of aspiration (tachypnea, cough, rales, wheezing, cyanosis, fever)  - Elevate head of bed 30 degrees if patient has tube feeding   - Monitor tube feeding  Outcome: Progressing     Problem: Nutrition  Goal: Nutrition/Hydration status is improving  Description  Monitor and assess patient's nutrition/hydration status for malnutrition (ex- brittle hair, bruises, dry skin, pale skin and conjunctiva, muscle wasting, smooth red tongue, and disorientation)  Collaborate with interdisciplinary team and initiate plan and interventions as ordered  Monitor patient's weight and dietary intake as ordered or per policy  Utilize nutrition screening tool and intervene per policy  Determine patient's food preferences and provide high-protein, high-caloric foods as appropriate  - Assist patient with eating   - Allow adequate time for meals   - Encourage patient to take dietary supplement as ordered  - Collaborate with clinical nutritionist   - Include patient/family/caregiver in decisions related to nutrition  Outcome: Progressing     Problem: Nutrition/Hydration-ADULT  Goal: Nutrient/Hydration intake appropriate for improving, restoring or maintaining nutritional needs  Description  Monitor and assess patient's nutrition/hydration status for malnutrition  Collaborate with interdisciplinary team and initiate plan and interventions as ordered  Monitor patient's weight and dietary intake as ordered or per policy  Utilize nutrition screening tool and intervene as necessary  Determine patient's food preferences and provide high-protein, high-caloric foods as appropriate       INTERVENTIONS:  - Monitor oral intake, urinary output, labs, and treatment plans  - Assess nutrition and hydration status and recommend course of action  - Evaluate amount of meals eaten  - Assist patient with eating if necessary   - Allow adequate time for meals  - Recommend/ encourage appropriate diets, oral nutritional supplements, and vitamin/mineral supplements  - Order, calculate, and assess calorie counts as needed  - Recommend, monitor, and adjust tube feedings and TPN/PPN based on assessed needs  - Assess need for intravenous fluids  - Provide specific nutrition/hydration education as appropriate  - Include patient/family/caregiver in decisions related to nutrition  Outcome: Progressing     Problem: PAIN - ADULT  Goal: Verbalizes/displays adequate comfort level or baseline comfort level  Description  Interventions:  - Encourage patient to monitor pain and request assistance  - Assess pain using appropriate pain scale  - Administer analgesics based on type and severity of pain and evaluate response  - Implement non-pharmacological measures as appropriate and evaluate response  - Consider cultural and social influences on pain and pain management  - Notify physician/advanced practitioner if interventions unsuccessful or patient reports new pain  Outcome: Progressing     Problem: INFECTION - ADULT  Goal: Absence or prevention of progression during hospitalization  Description  INTERVENTIONS:  - Assess and monitor for signs and symptoms of infection  - Monitor lab/diagnostic results  - Monitor all insertion sites, i e  indwelling lines, tubes, and drains  - Monitor endotracheal if appropriate and nasal secretions for changes in amount and color  - [place] appropriate cooling/warming therapies per order  - Administer medications as ordered  - Instruct and encourage patient and family to use good hand hygiene technique  - Identify and instruct in appropriate isolation precautions for identified infection/condition  Outcome: Progressing     Problem: SAFETY ADULT  Goal: Maintain or return to baseline ADL function  Description  INTERVENTIONS:  -  Assess patient's ability to carry out ADLs; assess patient's baseline for ADL function and identify physical deficits which impact ability to perform ADLs (bathing, care of mouth/teeth, toileting, grooming, dressing, etc )  - Assess/evaluate cause of self-care deficits   - Assess range of motion  - Assess patient's mobility; develop plan if impaired  - Assess patient's need for assistive devices and provide as appropriate  - Encourage maximum independence but intervene and supervise when necessary  - Involve family in performance of ADLs  - Assess for home care needs following discharge   - Consider OT consult to assist with ADL evaluation and planning for discharge  - Provide patient education as appropriate  Outcome: Progressing  Goal: Maintain or return mobility status to optimal level  Description  INTERVENTIONS:  - Assess patient's baseline mobility status (ambulation, transfers, stairs, etc )    - Identify cognitive and physical deficits and behaviors that affect mobility  - Identify mobility aids required to assist with transfers and/or ambulation (gait belt, sit-to-stand, lift, walker, cane, etc )  - [place] fall precautions as indicated by assessment  - Record patient progress and toleration of activity level on Mobility SBAR; progress patient to next Phase/Stage  - Instruct patient to call for assistance with activity based on assessment  - Consider rehabilitation consult to assist with strengthening/weightbearing, etc   Outcome: Progressing     Problem: DISCHARGE PLANNING  Goal: Discharge to home or other facility with appropriate resources  Description  INTERVENTIONS:  - Identify barriers to discharge w/patient and caregiver  - Arrange for needed discharge resources and transportation as appropriate  - Identify discharge learning needs (meds, wound care, etc )  - Arrange for interpretive services to assist at discharge as needed  - Refer to Case Management Department for coordinating discharge planning if the patient needs post-hospital services based on physician/advanced practitioner order or complex needs related to functional status, cognitive ability, or social support system  Outcome: Progressing     Problem: Knowledge Deficit  Goal: Patient/family/caregiver demonstrates understanding of disease process, treatment plan, medications, and discharge instructions  Description  Complete learning assessment and assess knowledge base    Interventions:  - Provide teaching at level of understanding  - Provide teaching via preferred learning methods  Outcome: Progressing

## 2020-02-12 NOTE — PHYSICAL THERAPY NOTE
Physical Therapy Cancellation Note    PT orders received  Chart reviewed  Patient currently intubated/sedated and not appropriate for PT  Will hold PT evaluation  Will continue to follow     Saira Ibarra Pt, DPT

## 2020-02-12 NOTE — PROGRESS NOTES
Progress Note - Neurology   Dior Block [de-identified] y o  female MRN: [id number]  Unit/Bed#: ICU 02 Encounter: [id number]    Assessment/ Plan:  1)  R parietal/ temporal IPH with brain compression s/p decompressive hemicraniectomy POD1  Unclear etiology at this time    -  MRI brain pending    - repeat CT head today demonstrates marked improvement in intraparenchymal hemorrhage,  Now with resolution of midline shift   - map goals per primary team   - recommend SBP less than 140   - hold all antiplatelet/anticoagulation at this time   - PT/OT /speech   - will continue to follow, please monitor exam and notify with changes       Subjective:    patient is an 25-year-old female  With HTN, sleep apnea, heart failure, GERD, arthritis and anxiety   Who originally presented to the [facility] Emergency Department late in the evening on 02/10/2020  due to altered mental status, headache, and nausea/vomiting  The patient was found to have a large right intraparenchymal hemorrhage, ICH score of 1, and was transferred to [facility] for higher level of care neurosurgical evaluation  The patient did undergo decompressive right tomas craniectomy  The patient required intubation for airway protection during surgery only  Of note, patient only mildly hypertensive upon arrival in the 586'H  Systolic  No acute events overnight  On exam today, the patient remains intubated and sedated on Precedex  She lays with eyes closed, but is easily arousable by verbal stimuli  She briskly follows commands x4  She does  Appear to have preserved sensation and is able to move all extremities antigravity  Unable to obtain review of systems secondary to intubation, however patient does grossly deny pain  Remainder of neurological exam as detailed below      ROS:  See Subjective    Medications:  Scheduled Meds:  Current Facility-Administered Medications:  atorvastatin 40 mg Oral QPM Michela Quezada MD    bisacodyl 10 mg Rectal Daily PRN Oliver Loja MD    chlorhexidine 15 mL Swish & Spit Q12H Anjelica Engel MD    dexmedetomidine 0 1-0 7 mcg/kg/hr Intravenous Titrated Dayday Marinelli MD Last Rate: 0 7 mcg/kg/hr (02/12/20 1119)   docusate sodium 100 mg Oral BID Oliver Loja MD    fentaNYL 25 mcg/hr Intravenous Continuous Oliver Loja MD Last Rate: Stopped (02/12/20 0940)   fentanyl citrate (PF) 50 mcg Intravenous Q1H PRN Dayday Marinelli MD    fentanyl citrate (PF) 50 mcg Intravenous Q1H PRN Dmitry A Paster, DO    Labetalol HCl 10 mg Intravenous Q4H PRN Dayday Marinelli MD    levETIRAcetam 500 mg Oral Q12H Anjelica Engel MD    levothyroxine 112 mcg Oral Early Morning Ana Cruz MD    ondansetron 4 mg Intravenous Q4H PRN Oliver Loja MD    pantoprazole 40 mg Oral Early Morning Ana Cruz MD    sodium chloride 75 mL/hr Intravenous Continuous Oliver Loja MD Last Rate: 75 mL/hr (02/12/20 0003)     Continuous Infusions:  dexmedetomidine 0 1-0 7 mcg/kg/hr Last Rate: 0 7 mcg/kg/hr (02/12/20 1119)   fentaNYL 25 mcg/hr Last Rate: Stopped (02/12/20 0940)   sodium chloride 75 mL/hr Last Rate: 75 mL/hr (02/12/20 0003)     PRN Meds: bisacodyl    fentanyl citrate (PF)    fentanyl citrate (PF)    Labetalol HCl    ondansetron      Vitals: Blood pressure 140/53, pulse (!) 46, temperature 97 5 °F (36 4 °C), temperature source Bladder, resp  rate 13, height 5' 1" (1 549 m), weight 94 8 kg (208 lb 15 9 oz), SpO2 97 %, not currently breastfeeding  ,Body mass index is 39 49 kg/m²  Physical Exam:   Physical Exam   Constitutional: She appears well-developed and well-nourished  No distress  HENT:   Right Ear: External ear normal    Left Ear: External ear normal    Mouth/Throat: No oropharyngeal exudate  Post surgical changes noted, incision clean and dry   Eyes: Conjunctivae are normal  Right eye exhibits no discharge  Left eye exhibits no discharge  No scleral icterus  Neck: Normal range of motion   Neck supple  Pulmonary/Chest: Effort normal  No respiratory distress  Musculoskeletal: She exhibits no edema, tenderness or deformity  Skin: Skin is warm and dry  No rash noted  She is not diaphoretic  No erythema  No pallor  Nursing note and vitals reviewed  Neurologic Exam     Mental Status    Sitting in bed with eyes closed  Briskly opens eyes to voice  Follows commands x 4       Cranial Nerves       CN 2 through 12 grossly intact     Motor Exam  Moves all extremities antigravity      Sensory Exam   Light touch normal      Gait, Coordination, and Reflexes     Gait  Gait: (Deferred for safety)        Lab, Imaging and other studies: I have personally reviewed pertinent reports     I have personally reviewed pertinent imagine in PACs  Recent Results (from the past 24 hour(s))   Type and screen    Collection Time: 02/11/20 11:58 PM   Result Value Ref Range    ABO Grouping A     Rh Factor Positive     Antibody Screen Negative     Specimen Expiration Date [id number]    Basic metabolic panel    Collection Time: 02/11/20 11:58 PM   Result Value Ref Range    Sodium 145 136 - 145 mmol/L    Potassium 3 2 (L) 3 5 - 5 3 mmol/L    Chloride 109 (H) 100 - 108 mmol/L    CO2 28 21 - 32 mmol/L    ANION GAP 8 4 - 13 mmol/L    BUN 22 5 - 25 mg/dL    Creatinine 1 15 0 60 - 1 30 mg/dL    Glucose 150 (H) 65 - 140 mg/dL    Calcium 8 5 8 3 - 10 1 mg/dL    eGFR 45 ml/min/1 73sq m   CBC    Collection Time: 02/12/20  6:26 AM   Result Value Ref Range    WBC 15 18 (H) 4 31 - 10 16 Thousand/uL    RBC 3 52 (L) 3 81 - 5 12 Million/uL    Hemoglobin 10 0 (L) 11 5 - 15 4 g/dL    Hematocrit 30 2 (L) 34 8 - 46 1 %    MCV 86 82 - 98 fL    MCH 28 4 26 8 - 34 3 pg    MCHC 33 1 31 4 - 37 4 g/dL    RDW 15 9 (H) 11 6 - 15 1 %    Platelets 752 (L) 855 - 390 Thousands/uL    MPV 11 2 8 9 - 12 7 fL   Basic metabolic panel    Collection Time: 02/12/20  6:26 AM   Result Value Ref Range    Sodium 145 136 - 145 mmol/L    Potassium 3 9 3 5 - 5 3 mmol/L Chloride 112 (H) 100 - 108 mmol/L    CO2 27 21 - 32 mmol/L    ANION GAP 6 4 - 13 mmol/L    BUN 25 5 - 25 mg/dL    Creatinine 1 11 0 60 - 1 30 mg/dL    Glucose 151 (H) 65 - 140 mg/dL    Calcium 8 5 8 3 - 10 1 mg/dL    eGFR 47 ml/min/1 73sq m   Magnesium    Collection Time: 02/12/20  6:26 AM   Result Value Ref Range    Magnesium 2 1 1 6 - 2 6 mg/dL   Phosphorus    Collection Time: 02/12/20  6:26 AM   Result Value Ref Range    Phosphorus 2 7 2 3 - 4 1 mg/dL   Blood gas, arterial    Collection Time: 02/12/20  6:26 AM   Result Value Ref Range    pH, Arterial 7 485 (H) 7 350 - 7 450    PH ART TC 7 491 (H) 7 350 - 7 450    pCO2, Arterial 33 8 (L) 36 0 - 44 0 mm Hg    PCO2 (TC) Arterial 33 2 (L) 36 0 - 44 0 mm Hg    pO2, Arterial 84 0 75 0 - 129 0 mm Hg    PO2 (TC) Arterial 81 9 75 0 - 129 0 mm Hg    HCO3, Arterial 24 9 22 0 - 28 0 mmol/L    Base Excess, Arterial 1 7 mmol/L    O2 Content, Arterial 14 6 (L) 16 0 - 23 0 mL/dL    O2 HGB,Arterial  96 3 94 0 - 97 0 %    SOURCE Line, Arterial     Temperature 97 9 Degrees Fehrenheit    Vent Type- AC AC     AC Rate 12     Tidal Volume 500 ml    Inspired Air (FIO2) 60     PEEP 5    ]      VTE Prophylaxis: Sequential compression device (Venodyne)  and Reason for no pharmacologic prophylaxis  IPH    Counseling / Coordination of Care  Total Critical Care time spent 30 minutes excluding procedures, teaching and family updates

## 2020-02-12 NOTE — PROGRESS NOTES
02/12/20 1200   Clinical Encounter Type   Visited With Health care provider   Routine Visit Follow-up   Continue Visiting Yes

## 2020-02-12 NOTE — PROGRESS NOTES
Progress Note - [facility] [place] 1940, [de-identified] y o  female MRN: [id number]    Unit/Bed#: ICU 02 Encounter: [id number]    Primary Care Provider: Sowmya Morgan MD   Date and time admitted to hospital: 2/10/2020 10:18 PM        * Intraparenchymal hemorrhage of brain [facility])  Assessment & Plan  POD1 Right decompressive craniectomy and evacuation of intraparenchymal hematoma   · ICH 1 on initial presentation, ICH 2 prior to OR with repeat CT head (increase in volume)  · Patient with exam decline, leading to surgical decompression  · Patient intubated, on fentanyl and precedex  · GCS 8T (E1, V1, M6) - Patient unwilling to open eyes to sternal rub (shakes head no), but will for other people   · Codman ICP monitor (2-4), VIRGINIE drain (205CC), SDD to 0mmHg (52cc)    Imaging:  · CT head w/o, 2/11/20: acute intraparenchymal hemorrhage involving the right temporal lobe with surrounding vasogenic edema and mass effect on the right lateral ventricular system  The size of this hematoma measures approximately 7 5 x 4 0 x 2 3 cm, minimally increased in size from the prior exam   There is approximately 4 mm of right-to-left midline shift, stable from the prior exam   · CT head w/o, 2/12/20: Interval postoperative changes of right-sided decompressive craniectomy and evacuation of a large right temporal lobe intraparenchymal hematoma  Interval decrease in mass effect on the right lateral ventricle  No midline shift  Plan:  · Continue to monitor drain outputs  · Monitor ICPs (2-4 today, 4 in room)  · Keppra 500mg BID x7 days  · SBP <140  · PT/OT/SLP  · Neurology following for stroke workup  · Wean to extubate when able  · Pain control per primary team  · DVT ppx: SCDs, hold pharm ppx in setting of acute H    Neurosurgery will continue to follow at this time  Please don't hesitate to contact us with any additional questions  Subjective/Objective     Subjective: Patient with NAEO  Remains intubated       Objective: Patient intubated on fentanyl and precedex  Follows commands  GCS 8T (E1, V1, M6)  Intake/Output       02/12/20 0701 - 02/13/20 0700      [id number] 5070-5042 Total       Intake    I V   258 4  -- 258 4    NG/GT  30  -- 30    Total Intake 288 4 -- 288 4       Output    Urine  232  -- 232    Output (mL) (Urethral Catheter Non-latex; Temperature probe 14 Fr ) 232 -- 232    Emesis/NG output  250  -- 250    Output (mL) (NG/OG/Enteral Tube Orogastric Center mouth) 250 -- 250    Drains  93  -- 93    Output (mL) (Closed/Suction Drain Right Head Bulb) 80 -- 80    Output (mL) (Ventriculostomy/Subdural Subdural drainage catheter Right Temporal region) 13 -- 13    Total Output 575 -- 575       Net I/O     -286 6 -- -286  6          Invasive Devices     Peripheral Intravenous Line            Peripheral IV 02/12/20 Left Forearm less than 1 day    Peripheral IV 02/12/20 Right Antecubital less than 1 day          Arterial Line            Arterial Line 02/11/20 Radial 1 day          Drain            Closed/Suction Drain Right Head Bulb 1 day    ICP Device ICP microsensor fiber Right Frontal region 1 day    NG/OG/Enteral Tube Orogastric Center mouth 1 day    Urethral Catheter Non-latex; Temperature probe 14 Fr  1 day    Ventriculostomy/Subdural Subdural drainage catheter Right Temporal region 1 day          Airway            ETT  Cuffed; Hi-Lo 7 mm 1 day                Vitals: Blood pressure 140/53, pulse (!) 44, temperature (!) 97 2 °F (36 2 °C), temperature source Bladder, resp  rate 15, height 5' 1" (1 549 m), weight 94 8 kg (208 lb 15 9 oz), SpO2 97 %, not currently breastfeeding  ,Body mass index is 39 49 kg/m²  Hemodynamic Monitoring: MAP: Arterial Line MAP (mmHg): 74 mmHg, ICP Mean: ICP Mean (mmHg): 4 mmHg     General appearance: appears stated age, follows some commands  Head: Normocephalic, right cranial incision CDI, covered with telfa   VIRGINIE drain to FS, codman ICP monitor, SDD at [facility]  Eyes: refuses to open eyes to sternal rub, shakes head no  Neck: supple, symmetrical, trachea midline and NT  Back: no kyphosis present, no tenderness to percussion or palpation  Lungs: intubated  Heart: bradycardic (40-50)  Neurologic:   Mental status: GCS 8T (E1, V1, M6)  Cranial nerves: grossly intact (Cranial nerves II-XII)  Sensory: withdraws to LT on bottom of feet  Motor: moving all extremities independently  Reflexes: 2+ and symmetric    Lab Results: I have personally reviewed pertinent results      Results from last 7 days   Lab Units 02/12/20 0626 02/11/20 1011 02/11/20  0806 02/10/20  2005   WBC Thousand/uL 15 18*  --  13 51* 11 26*   HEMOGLOBIN g/dL 10 0*  --  11 6 13 1   I STAT HEMOGLOBIN g/dl  --  9 9*  --   --    HEMATOCRIT % 30 2*  --  36 9 41 2   HEMATOCRIT, ISTAT %  --  29*  --   --    PLATELETS Thousands/uL 140*  --  182 193   NEUTROS PCT %  --   --  89* 88*   MONOS PCT %  --   --  5 4     Results from last 7 days   Lab Units 02/12/20 0626 02/11/20  2358 02/11/20  1011 02/11/20  0806 02/10/20  2005   POTASSIUM mmol/L 3 9 3 2*  --  3 6 4 9   CHLORIDE mmol/L 112* 109*  --  106 102   CO2 mmol/L 27 28  --  31 26   CO2, I-STAT mmol/L  --   --  28  --   --    BUN mg/dL 25 22  --  21 24   CREATININE mg/dL 1 11 1 15  --  1 14 1 05   CALCIUM mg/dL 8 5 8 5  --  9 1 9 3   ALK PHOS U/L  --   --   --  132* 153*   ALT U/L  --   --   --  19 26   AST U/L  --   --   --  15 41   GLUCOSE, ISTAT mg/dl  --   --  124  --   --      Results from last 7 days   Lab Units 02/12/20  0626 02/11/20  0604   MAGNESIUM mg/dL 2 1 2 0     Results from last 7 days   Lab Units 02/12/20  0626 02/11/20  0604   PHOSPHORUS mg/dL 2 7 4 0     Results from last 7 days   Lab Units 02/10/20  2043   INR  1 13   PTT seconds 31     No results found for: TROPONINT  ABG:  Lab Results   Component Value Date    PHART 7 485 (H) 02/12/2020    XMS2CGW 33 8 (L) 02/12/2020    PO2ART 84 0 02/12/2020    BDX0IUP 24 9 02/12/2020    BEART 1 7 02/12/2020    SOURCE Line, Arterial 02/12/2020 Imaging Studies: I have personally reviewed pertinent reports  and I have personally reviewed pertinent films in PACS     Cta Head And Neck With And Without Contrast    Result Date: 2/10/2020  Narrative: CTA NECK AND BRAIN WITH CONTRAST INDICATION: Neuro deficit, acute, stroke suspected acute intracranial hemorrhage  COMPARISON:   CT brain unenhanced performed approximately 1 hour prior  TECHNIQUE: Post contrast imaging was performed after administration of iodinated contrast through the neck and brain  Post contrast axial 0 625 mm images timed to opacify the arterial system  3D rendering was performed on an independent workstation  MIP reconstructions performed  Coronal reconstructions were performed of the noncontrast portion of the brain  Radiation dose length product (DLP) for this visit:  448 mGy-cm   This examination, like all CT scans performed in the [facility], was performed utilizing techniques to minimize radiation dose exposure, including the use of iterative reconstruction and automated exposure control  IV Contrast:  85 mL of iohexol (OMNIPAQUE)  IMAGE QUALITY:   Diagnostic FINDINGS: Again noted is a large area of acute intracranial hemorrhage involving the right temporal lobe with mass effect on the right lateral ventricular system  There is approximately 5 mm of left-to-right midline shift, stable  CERVICAL VASCULATURE AORTIC ARCH AND GREAT VESSELS:  Normal aortic arch and great vessel origins  Normal visualized subclavian vessels  RIGHT VERTEBRAL ARTERY CERVICAL SEGMENT:  Normal origin  The vessel is small in caliber throughout the neck  LEFT VERTEBRAL ARTERY CERVICAL SEGMENT:  Normal origin  The vessel is normal in caliber throughout the neck  RIGHT EXTRACRANIAL CAROTID SEGMENT:  Mild atherosclerotic disease of the distal common carotid artery and proximal cervical internal carotid artery without significant stenosis compared to the more distal ICA   LEFT EXTRACRANIAL CAROTID SEGMENT:  Mild atherosclerotic disease of the distal common carotid artery and proximal cervical internal carotid artery without significant stenosis compared to the more distal ICA  NASCET criteria was used to determine the degree of internal carotid artery diameter stenosis  INTRACRANIAL VASCULATURE INTERNAL CAROTID ARTERIES:  Normal enhancement of the intracranial portions of the internal carotid arteries  There is atherosclerotic calcification of the bilateral cavernous internal carotid arteries  Normal ophthalmic artery origins  Normal ICA terminus  ANTERIOR CIRCULATION:  Symmetric A1 segments and anterior cerebral arteries with normal enhancement  Normal anterior communicating artery  MIDDLE CEREBRAL ARTERY CIRCULATION:  M1 segment and middle cerebral artery branches demonstrate normal enhancement bilaterally  DISTAL VERTEBRAL ARTERIES:  There is a dominant distal left vertebral artery  Posterior inferior cerebellar artery origins are normal  Normal vertebral basilar junction  BASILAR ARTERY:  Basilar artery is normal in caliber  Normal superior cerebellar arteries  POSTERIOR CEREBRAL ARTERIES: Both posterior cerebral arteries arises from the basilar tip  Both arteries demonstrate normal enhancement  Normal posterior communicating arteries  DURAL VENOUS SINUSES:  Normal  NON VASCULAR ANATOMY BONY STRUCTURES:  No acute osseous abnormality  SOFT TISSUES OF THE NECK:  Normal  THORACIC INLET:  There is a partially visualized left pleural effusion  There is interstitial prominence noted at both lung apices with scattered areas of groundglass density suspicious for mild pulmonary vascular congestion  Impression: Reidentified large acute right temporal intraparenchymal hemorrhage with mass effect on the right lateral ventricular system and minimal to mild right to left midline shift  No focal stenosis or saccular aneurysm within the [place]   No hemodynamically significant stenosis within either common or internal carotid artery  Less than 50% stenosis by NASCET criteria  Partially visualized left pleural effusion  Workstation performed: [id number]     Xr Chest 1 View Portable    Result Date: 2/11/2020  Narrative: CHEST INDICATION:   htn  COMPARISON:  February 26, 2019 EXAM PERFORMED/VIEWS:  XR CHEST PORTABLE FINDINGS: Top normal heart size  Calcified plaque within the arch  Mild central pulmonary vascular distention  The lungs are clear  No pneumothorax or pleural effusion  Osseous structures appear within normal limits for patient age  Impression: Stable compared to prior  No acute cardiopulmonary disease  Workstation performed: [id number]     Ct Head Wo Contrast    Result Date: 2/12/2020  Narrative: CT BRAIN - WITHOUT CONTRAST INDICATION:   Status post craniotomy evacuation of hematoma  COMPARISON:  CT brain dated February 11, 2020 at 5:00 AM TECHNIQUE:  CT examination of the brain was performed  In addition to axial images, coronal 2D reformatted images were created and submitted for interpretation  Radiation dose length product (DLP) for this visit:  859 28 mGy-cm   This examination, like all CT scans performed in the [facility], was performed utilizing techniques to minimize radiation dose exposure, including the use of iterative  reconstruction and automated exposure control  IMAGE QUALITY:  Diagnostic  FINDINGS: PARENCHYMA:  Interval postoperative changes of a right-sided decompressive craniectomy are present for a large right temporal hematoma evacuation  There is a drainage catheter with the tip located at the anterior medial right temporal fossa  Another drainage catheter is present with the tip at the anterior margin of the craniectomy site    There has been significant interval decrease in size of the previously noted large right temporal intraparenchymal hematoma with a small amount of residual acute hemorrhage noted at the posterior right temporal lobe  Mild vasogenic edema involving the right temporal lobe remains  There has been interval decrease in mass effect on the right lateral ventricle no midline shift  VENTRICLES AND EXTRA-AXIAL SPACES:  See above  No hydrocephalus  VISUALIZED ORBITS AND PARANASAL SINUSES:  Unremarkable  CALVARIUM AND EXTRACRANIAL SOFT TISSUES:  Normal      Impression: Interval postoperative changes of right-sided decompressive craniectomy and evacuation of a large right temporal lobe intraparenchymal hematoma  Interval decrease in mass effect on the right lateral ventricle  No midline shift  No hydrocephalus  Workstation performed: [id number]     Ct Head Wo Contrast    Result Date: 2/11/2020  Narrative: CT BRAIN - WITHOUT CONTRAST INDICATION:   Stroke, follow up  COMPARISON:  CT brain dated February 10, 2020 at 8:17 PM  TECHNIQUE:  CT examination of the brain was performed  In addition to axial images, coronal 2D reformatted images were created and submitted for interpretation  Radiation dose length product (DLP) for this visit:  1724 71 mGy-cm   This examination, like all CT scans performed in the [facility], was performed utilizing techniques to minimize radiation dose exposure, including the use of iterative reconstruction and automated exposure control  IMAGE QUALITY:  Diagnostic  FINDINGS: PARENCHYMA:  Again noted is acute intraparenchymal hemorrhage involving the right temporal lobe with surrounding vasogenic edema and mass effect on the right lateral ventricular system  The size of this hematoma measures approximately 7 5 x 4 0 x 2 3 cm  (AP, transverse, cc), minimally increased in size from the prior exam   There is approximately 4 mm of right-to-left midline shift, stable from the prior exam  VENTRICLES AND EXTRA-AXIAL SPACES:  See above  No hydrocephalus  VISUALIZED ORBITS AND PARANASAL SINUSES:  Unremarkable   CALVARIUM AND EXTRACRANIAL SOFT TISSUES:  Normal      Impression: Reidentified large acute intraparenchymal hemorrhage involving the right temporal lobe with mass effect on the right lateral ventricle and minimal to mild right to left midline shift  The size of this intraparenchymal hematoma has minimally increased from the prior exam  primarily in the AP dimension  Workstation performed: [id number]     Ct Head Without Contrast    Result Date: 2/10/2020  Narrative: CT BRAIN - WITHOUT CONTRAST INDICATION:   Headache, acute, normal neuro exam   76-year-old female with elevated blood pressure, headache, nausea and vomiting  COMPARISON:  None  TECHNIQUE:  CT examination of the brain was performed  In addition to axial images, coronal 2D reformatted images were created and submitted for interpretation  Radiation dose length product (DLP) for this visit:  816 mGy-cm   This examination, like all CT scans performed in the [facility], was performed utilizing techniques to minimize radiation dose exposure, including the use of iterative reconstruction and automated exposure control  IMAGE QUALITY:  Diagnostic  FINDINGS: PARENCHYMA:  No intracranial mass  No CT signs of acute infarction  6 3 x 3 5 x 2 5 cm acute hemorrhage in the right temporal lobe with surrounding edema, effacement of the overlying cortical sulci and of the right sylvian fissure  There is mild mass effect and compression of the right lateral ventricle  However, there is no midline shift and no subfalcine or uncal herniation  Hemorrhage volume approximately 29 mL  VENTRICLES AND EXTRA-AXIAL SPACES:  Narrowing of the right lateral ventricle, as mentioned above  Left lateral ventricle and 3rd and 4th ventricles unremarkable and unchanged from the CT from 2016  Mild effacement of the right side of the perimesencephalic cistern  Basilar cisterns otherwise unremarkable    Suggestion of small chronic right frontal subdural collection, 2 mm in maximal thickness with no mass effect (series 2, image 21; series 400, images 29-37)  VISUALIZED ORBITS AND PARANASAL SINUSES:  Unremarkable  CALVARIUM AND EXTRACRANIAL SOFT TISSUES:  Normal      Impression: 1   6 3 x 3 5 x 2 5 cm acute intracerebral hemorrhage in the right temporal lobe with mild mass effect but no midline shift or herniation  2   Probable small chronic right frontal subdural collection, 2 mm in thickness  I personally discussed this study with Hildegarde Soulier on 2/10/2020 at 8:30 PM  Workstation performed: [id number]     EKG, Pathology, and Other Studies: I have personally reviewed pertinent reports        VTE Pharmacologic Prophylaxis: Reason for no pharmacologic prophylaxis post op    VTE Mechanical Prophylaxis: sequential compression device

## 2020-02-13 LAB
ANION GAP SERPL CALCULATED.3IONS-SCNC: 8 MMOL/L (ref 4–13)
BASOPHILS # BLD AUTO: 0 THOUSANDS/ΜL (ref 0–0.1)
BASOPHILS NFR BLD AUTO: 0 % (ref 0–1)
BUN SERPL-MCNC: 33 MG/DL (ref 5–25)
CALCIUM SERPL-MCNC: 8.5 MG/DL (ref 8.3–10.1)
CHLORIDE SERPL-SCNC: 111 MMOL/L (ref 100–108)
CO2 SERPL-SCNC: 26 MMOL/L (ref 21–32)
CREAT SERPL-MCNC: 1.18 MG/DL (ref 0.6–1.3)
EOSINOPHIL # BLD AUTO: 0 THOUSAND/ΜL (ref 0–0.61)
EOSINOPHIL NFR BLD AUTO: 0 % (ref 0–6)
ERYTHROCYTE [DISTWIDTH] IN BLOOD BY AUTOMATED COUNT: 15.8 % (ref 11.6–15.1)
GFR SERPL CREATININE-BSD FRML MDRD: 44 ML/MIN/1.73SQ M
GLUCOSE SERPL-MCNC: 159 MG/DL (ref 65–140)
HCT VFR BLD AUTO: 34.8 % (ref 34.8–46.1)
HGB BLD-MCNC: 10.8 G/DL (ref 11.5–15.4)
IMM GRANULOCYTES # BLD AUTO: 0.15 THOUSAND/UL (ref 0–0.2)
IMM GRANULOCYTES NFR BLD AUTO: 1 % (ref 0–2)
LYMPHOCYTES # BLD AUTO: 0.56 THOUSANDS/ΜL (ref 0.6–4.47)
LYMPHOCYTES NFR BLD AUTO: 4 % (ref 14–44)
MCH RBC QN AUTO: 27.1 PG (ref 26.8–34.3)
MCHC RBC AUTO-ENTMCNC: 31 G/DL (ref 31.4–37.4)
MCV RBC AUTO: 87 FL (ref 82–98)
MONOCYTES # BLD AUTO: 0.49 THOUSAND/ΜL (ref 0.17–1.22)
MONOCYTES NFR BLD AUTO: 4 % (ref 4–12)
NEUTROPHILS # BLD AUTO: 12.56 THOUSANDS/ΜL (ref 1.85–7.62)
NEUTS SEG NFR BLD AUTO: 91 % (ref 43–75)
NRBC BLD AUTO-RTO: 0 /100 WBCS
PLATELET # BLD AUTO: 165 THOUSANDS/UL (ref 149–390)
PMV BLD AUTO: 11.5 FL (ref 8.9–12.7)
POTASSIUM SERPL-SCNC: 3.7 MMOL/L (ref 3.5–5.3)
RBC # BLD AUTO: 3.98 MILLION/UL (ref 3.81–5.12)
SODIUM SERPL-SCNC: 145 MMOL/L (ref 136–145)
WBC # BLD AUTO: 13.76 THOUSAND/UL (ref 4.31–10.16)

## 2020-02-13 PROCEDURE — 85025 COMPLETE CBC W/AUTO DIFF WBC: CPT | Performed by: INTERNAL MEDICINE

## 2020-02-13 PROCEDURE — 99024 POSTOP FOLLOW-UP VISIT: CPT | Performed by: NEUROLOGICAL SURGERY

## 2020-02-13 PROCEDURE — 80048 BASIC METABOLIC PNL TOTAL CA: CPT | Performed by: INTERNAL MEDICINE

## 2020-02-13 PROCEDURE — NC001 PR NO CHARGE: Performed by: INTERNAL MEDICINE

## 2020-02-13 PROCEDURE — 94003 VENT MGMT INPAT SUBQ DAY: CPT

## 2020-02-13 PROCEDURE — 94760 N-INVAS EAR/PLS OXIMETRY 1: CPT

## 2020-02-13 PROCEDURE — 99233 SBSQ HOSP IP/OBS HIGH 50: CPT | Performed by: EMERGENCY MEDICINE

## 2020-02-13 RX ORDER — POTASSIUM CHLORIDE 20MEQ/15ML
20 LIQUID (ML) ORAL ONCE
Status: COMPLETED | OUTPATIENT
Start: 2020-02-13 | End: 2020-02-13

## 2020-02-13 RX ORDER — HYDRALAZINE HYDROCHLORIDE 20 MG/ML
5 INJECTION INTRAMUSCULAR; INTRAVENOUS EVERY 6 HOURS PRN
Status: DISCONTINUED | OUTPATIENT
Start: 2020-02-13 | End: 2020-02-18 | Stop reason: HOSPADM

## 2020-02-13 RX ORDER — AMOXICILLIN 250 MG
1 CAPSULE ORAL 2 TIMES DAILY
Status: DISCONTINUED | OUTPATIENT
Start: 2020-02-13 | End: 2020-02-18 | Stop reason: HOSPADM

## 2020-02-13 RX ORDER — AMLODIPINE BESYLATE 5 MG/1
5 TABLET ORAL DAILY
Status: DISCONTINUED | OUTPATIENT
Start: 2020-02-13 | End: 2020-02-14

## 2020-02-13 RX ORDER — LANOLIN ALCOHOL/MO/W.PET/CERES
3 CREAM (GRAM) TOPICAL
Status: DISCONTINUED | OUTPATIENT
Start: 2020-02-13 | End: 2020-02-18 | Stop reason: HOSPADM

## 2020-02-13 RX ORDER — HYDRALAZINE HYDROCHLORIDE 20 MG/ML
10 INJECTION INTRAMUSCULAR; INTRAVENOUS ONCE
Status: COMPLETED | OUTPATIENT
Start: 2020-02-13 | End: 2020-02-13

## 2020-02-13 RX ORDER — BUMETANIDE 2 MG/1
2 TABLET ORAL DAILY
Status: DISCONTINUED | OUTPATIENT
Start: 2020-02-13 | End: 2020-02-14

## 2020-02-13 RX ORDER — DEXAMETHASONE 4 MG/1
4 TABLET ORAL EVERY 6 HOURS SCHEDULED
Status: COMPLETED | OUTPATIENT
Start: 2020-02-13 | End: 2020-02-13

## 2020-02-13 RX ORDER — HYDROMORPHONE HCL/PF 1 MG/ML
1 SYRINGE (ML) INJECTION ONCE
Status: COMPLETED | OUTPATIENT
Start: 2020-02-13 | End: 2020-02-13

## 2020-02-13 RX ORDER — DEXAMETHASONE 4 MG/1
4 TABLET ORAL EVERY 6 HOURS SCHEDULED
Status: DISCONTINUED | OUTPATIENT
Start: 2020-02-13 | End: 2020-02-13

## 2020-02-13 RX ADMIN — AMLODIPINE BESYLATE 5 MG: 5 TABLET ORAL at 16:15

## 2020-02-13 RX ADMIN — DEXAMETHASONE SODIUM PHOSPHATE 4 MG: 4 INJECTION, SOLUTION INTRAMUSCULAR; INTRAVENOUS at 05:55

## 2020-02-13 RX ADMIN — LABETALOL 20 MG/4 ML (5 MG/ML) INTRAVENOUS SYRINGE 10 MG: at 21:40

## 2020-02-13 RX ADMIN — CHLORHEXIDINE GLUCONATE 0.12% ORAL RINSE 15 ML: 1.2 LIQUID ORAL at 09:32

## 2020-02-13 RX ADMIN — LEVETIRACETAM 500 MG: 500 TABLET, FILM COATED ORAL at 20:43

## 2020-02-13 RX ADMIN — DEXAMETHASONE SODIUM PHOSPHATE 4 MG: 4 INJECTION, SOLUTION INTRAMUSCULAR; INTRAVENOUS at 11:26

## 2020-02-13 RX ADMIN — FENTANYL CITRATE 50 MCG: 50 INJECTION, SOLUTION INTRAMUSCULAR; INTRAVENOUS at 02:53

## 2020-02-13 RX ADMIN — PANTOPRAZOLE SODIUM 40 MG: 40 TABLET, DELAYED RELEASE ORAL at 05:55

## 2020-02-13 RX ADMIN — POTASSIUM CHLORIDE 20 MEQ: 1.5 SOLUTION ORAL at 13:47

## 2020-02-13 RX ADMIN — HYDRALAZINE HYDROCHLORIDE 10 MG: 20 INJECTION INTRAMUSCULAR; INTRAVENOUS at 05:56

## 2020-02-13 RX ADMIN — FENTANYL CITRATE 50 MCG: 50 INJECTION, SOLUTION INTRAMUSCULAR; INTRAVENOUS at 01:53

## 2020-02-13 RX ADMIN — DEXMEDETOMIDINE 0.7 MCG/KG/HR: 100 INJECTION, SOLUTION, CONCENTRATE INTRAVENOUS at 03:24

## 2020-02-13 RX ADMIN — HYDROMORPHONE HYDROCHLORIDE 1 MG: 1 INJECTION, SOLUTION INTRAMUSCULAR; INTRAVENOUS; SUBCUTANEOUS at 05:32

## 2020-02-13 RX ADMIN — CHLORHEXIDINE GLUCONATE 0.12% ORAL RINSE 15 ML: 1.2 LIQUID ORAL at 20:43

## 2020-02-13 RX ADMIN — FENTANYL CITRATE 50 MCG: 50 INJECTION, SOLUTION INTRAMUSCULAR; INTRAVENOUS at 09:32

## 2020-02-13 RX ADMIN — DEXAMETHASONE SODIUM PHOSPHATE 4 MG: 4 INJECTION, SOLUTION INTRAMUSCULAR; INTRAVENOUS at 00:14

## 2020-02-13 RX ADMIN — DEXAMETHASONE 4 MG: 4 TABLET ORAL at 18:27

## 2020-02-13 RX ADMIN — LEVOTHYROXINE SODIUM 112 MCG: 112 TABLET ORAL at 05:55

## 2020-02-13 RX ADMIN — ATORVASTATIN CALCIUM 40 MG: 40 TABLET, FILM COATED ORAL at 18:27

## 2020-02-13 RX ADMIN — SENNOSIDES AND DOCUSATE SODIUM 1 TABLET: 8.6; 5 TABLET ORAL at 13:46

## 2020-02-13 RX ADMIN — DEXMEDETOMIDINE 0.7 MCG/KG/HR: 100 INJECTION, SOLUTION, CONCENTRATE INTRAVENOUS at 11:24

## 2020-02-13 RX ADMIN — MELATONIN 3 MG: 3 TAB ORAL at 21:40

## 2020-02-13 RX ADMIN — BUMETANIDE 2 MG: 2 TABLET ORAL at 15:03

## 2020-02-13 RX ADMIN — HYDRALAZINE HYDROCHLORIDE 5 MG: 20 INJECTION INTRAMUSCULAR; INTRAVENOUS at 16:15

## 2020-02-13 RX ADMIN — LEVETIRACETAM 500 MG: 500 TABLET, FILM COATED ORAL at 09:32

## 2020-02-13 RX ADMIN — DOCUSATE SODIUM 100 MG: 100 CAPSULE, LIQUID FILLED ORAL at 09:32

## 2020-02-13 RX ADMIN — HYDRALAZINE HYDROCHLORIDE 5 MG: 20 INJECTION INTRAMUSCULAR; INTRAVENOUS at 20:46

## 2020-02-13 NOTE — OCCUPATIONAL THERAPY NOTE
OT CANCEL NOTE:    Orders for OT evaluation received  Pt is currently intubated and not able to participate in skilled occupational therapy evaluation  OT will continue to follow and evaluate as appropriate

## 2020-02-13 NOTE — RESPIRATORY THERAPY NOTE
RT Ventilator Management Note  Blair Padilla [de-identified] y o  female MRN: [id number]  Unit/Bed#: ICU 02 Encounter: [id number]      Daily Screen       2/11/2020  1204 2/13/2020  0824          Patient safety screen outcome[de-identified]  Failed  Passed      Not Ready for Weaning due to[de-identified]          Spont breathing trial % for 30 min:                  Physical Exam:   Assessment Type: Assess only  General Appearance: Sedated  Respiratory Pattern: Assisted  Chest Assessment: Chest expansion symmetrical  Bilateral Breath Sounds: Clear, Diminished      Resp Comments: Endotube removed by Dr Andre Gong over tube exchanger  Pt  in no distress removed and placed on nasal cannula at 6l/m    O2 sat 96%

## 2020-02-13 NOTE — ORTHOTIC NOTE
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)    Hypothyroidism    Acute on chronic diastolic congestive heart failure (HCC)    Arthropathy of knee    Hallux abductovalgus with bunions, unspecified laterality    Atherosclerosis of arteries of extremities (HCC)    Chronic low back pain    Chronic venous insufficiency    CKD (chronic kidney disease), stage III (HCC)    Difficulty in walking    Diverticulitis of colon    Elevated triglycerides with high cholesterol    Chronic gout without tophus    Hiatal hernia    Hyperlipemia    Hyperuricemia    Knee pain    Lumbar disc herniation with radiculopathy    Morbid obesity with body mass index of 40 0-44 9 in adult ([address] )    Nephrolithiasis    Onychomycosis    WENDI (obstructive sleep apnea)    Umbilical hernia    Tarsal tunnel syndrome    Rupture of popliteal cyst    Pseudogout of left wrist    Plantar fascial fibromatosis    Anxiety    Alkaline phosphatase elevation    Pain in both feet    Gastroesophageal reflux disease without esophagitis    Iron deficiency anemia secondary to inadequate dietary iron intake    Radiculopathy of lumbar region    Corns    SOB (shortness of breath)    Eczema    Osteoporosis screening    Peripheral arteriosclerosis ([facility] [address] )    Intraparenchymal hemorrhage of brain ([facility] [address] )   [employer] Protective Helmet   [employer] to fit and aware  I measured, patient at brow line 22 inches for protective helmet  [facility] [employer] fitter contacted and Render Kecia will arrive in the AM 2/14 to fit  The restorative team and I will contineu to follow up daily  Recommendations:  Please call Mobility Coordinator at ext   [phone] in regards to bracing instruction and/or adjustment  Mando Pickens Mobility Coordinator LCFo, LCOF, ASOP R  O T, O B T

## 2020-02-13 NOTE — PHYSICAL THERAPY NOTE
Physical Therapy Cancellation Note  Pt orders received  Chart reviewed  Pt currently intubated and sedated and not appropriate for mobility  Will hold PT for today  Will continue to follow     Lexis Toussaint, Pt, DPT

## 2020-02-13 NOTE — PROGRESS NOTES
Daily Progress Note - Critical Care   Tigist Fermin [de-identified] y o  female MRN: [id number]  Unit/Bed#: ICU 02 Encounter: [id number]    ----------------------------------------------------------------------------------------  HPI/24hr events:  76-year-old female w/ PMH HTN , WENDI , HF, GERD, arthritis, anxiety p/w AMS, N/V, HA to [facility] 2/10  Patient lives at home with son and daughter-in-law, was described to be in her normal state during the morning but off from baseline by evening  CTH in the ED showed right parietal/temporal ICH with mass effect; patient's exam noted to have mild right-sided weakness and she was able to converse w/o focal findings  Patient transferred to [facility] for critical care neurosurgical management, given 25 g mannitol en route  Encephalopathy improved on admission, patient was awake and alert and answered questions appropriately  She was admitted and continued on the [address] stroke pathway as follows:  Neuro checks q 1 hour, SBP goals 130-150, Cardene, CTH a m , possible osmolar therapy with mannitol versus 3% saline, neurosurgery and Neurology consulted and aware, echo, lipid, A1c, MRI, CTA w/o vascular abnormality identified, stat repeat imaging w/ new focality or decrease in GCS by greater than 2 points  ICH volume approximately 29 mL, ICH score on admission 1   GCS on admission 15    Critical care course:  2/11 emergent decompressive craniectomy and evacuation of intraparenchymal hematoma (worsening exam 0330 --> repeat CTH showed increased IPH w/ early signs of uncal herniation  2/11 pm: patient returned to the ICU moving all 4 extremities volitionally but not to commands, VSS, goals per Neurosurgery, VIRGINIE drain to suction, Codman intracranial post-craniotomy ICP monitor placed, calibrated @ 499  2/12: no air leak, could not extubate, very agitated overnight - insufficiently sedated  ---------------------------------------------------------------------------------------  SUBJECTIVE  Patient examined multiple times this morning, was agitated while being moved, indicates that she wants her ETT out, per nursing very inadequate sedation, moves all four extremities to commands and independently purposefully; did not meet BP goals overnight as labetalol prn was often held for bradycardia    Review of Systems   Reason unable to perform ROS: ROS limited 2/2 intubation, patient nods her head to diffuse tenderness everywhere, endorses HA  Cardiovascular: Positive for chest pain  Gastrointestinal: Positive for abdominal pain  Neurological: Positive for headaches       ---------------------------------------------------------------------------------------  Assessment and Plan:    Neuro:    Diagnosis:  ICH  o CTH:  Acute ICH in the right temporal lobe with mild mass effect but no significant midline shift or herniation, small chronic right frontal subdural collection 2 mm  o CTA:  Large acute right temporal intraparenchymal hemorrhage with mass effect on the right lateral ventricle, minimal to mild right-to-left midline shift, no focal stenosis or saccular aneurysm within the [place], no hemodynamically significant stenosis in either ICA, less than 50% stenosis by NASCET criteria  o Plan:   - Atorvastatin 40 mg qd  - Keppra 500 mg q12h day 3/7  - Q1h neuro checks  - Goal SBP < 140  - Neurology and neurosurgery following, appreciate recommendations  - F/u imaging and stroke workup  · PAIN/DELIRIUM/AGITATION:  ? Plan:  ? Fentanyl x 3 given overnight, precedex 400 (@ 0 7 this am)  ? CAM-ICU  ? 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 Most recent Echo LVEF 55-60% with no regional wall motion abnormalities  ? Home meds Bumex 2 mg daily with b i d  Dosing on Monday Wednesday Friday  ? Plan:    ? Diuresed yesterday w/ Bumex 2 mg x 1 ( overnight)  ? F/u echo and stroke pathway workup  ?  Labetalol and hydralazine prn  · Diagnosis:   HLD  ? Plan:   ? F/u labs  · Diagnosis:  HTN  ? Plan:    ? On admission Cardene drip for blood pressure goals 130-150   ? No drips postop, consider restarting home meds when stabilized     Pulm:  · Diagnosis:  WENDI on BiPAP  ? Plan:  ? As below  · Diagnosis: intubated   ? Plan:    ? Hold BiPAP pending extubation  ? Wean ventilator, plan to extubate today       GI:   · Diagnosis:   GERD   ? Plan: continue protonix 40        :   · Diagnosis:   history CKD  · Plan:  · BUN bumped to 33 today (Cr 1 18)  · May need to use gently fluids       F/E/N:   · Fluids:   fluid started per Neurosurgery  · May need to restart fluids  · Follow UOP - may need to diurese prn  · Electrolytes:     · Monitor electrolytes, replete as necessary  · Nutrition:     · Plan to extubate today, f/u w/ nutrition recommendations    Intake/Output Summary (Last 24 hours) at 2/13/2020 0855  Last data filed at 2/13/2020 0600  Gross per 24 hour   Intake 1083 74 ml   Output 1801 ml   Net -717 26 ml       Heme/Onc:   · Diagnosis:   no acute issues  ? Plan:     ? DVT ppx held     Endo:   · Diagnosis:   history of hypothyroidism  ? Plan:    ? Home levothyroxine 112 mcg     ID:   · Diagnosis:   no acute issues  ? Plan:    ? Follow-up CBC  ? Monitor temps     MSK/Skin:   · Frequent repositioning  · PT/OT  · Consider PMR consult when appropriate      Disposition: Continue Critical Care   Code Status: Level 2 - DNAR: but accepts endotracheal intubation  ---------------------------------------------------------------------------------------  ICU CORE MEASURES    Prophylaxis   VTE Pharmacologic Prophylaxis: Pharmacologic VTE Prophylaxis contraindicated due to Intraparenchymal hemorrhage  VTE Mechanical Prophylaxis: sequential compression device  Stress Ulcer Prophylaxis: Pantoprazole PO    ABCDE Protocol (if indicated)  Plan to perform spontaneous awakening trial today? Yes  Plan to perform spontaneous breathing trial today? No  Obvious barriers to extubation? Yes  CAM-ICU: Positive    Invasive Devices Review  Invasive Devices     Peripheral Intravenous Line            Peripheral IV 20 Left Forearm 1 day          Arterial Line            Arterial Line 20 Radial 1 day          Drain            Closed/Suction Drain Right Head Bulb 1 day    ICP Device ICP microsensor fiber Right Frontal region 1 day    NG/OG/Enteral Tube Orogastric Center mouth 1 day    Urethral Catheter Non-latex; Temperature probe 14 Fr  1 day    Ventriculostomy/Subdural Subdural drainage catheter Right Temporal region 1 day          Airway            ETT  Cuffed; Hi-Lo 7 mm 2 days              Can any invasive devices be discontinued today? No  ---------------------------------------------------------------------------------------  OBJECTIVE    Vitals     Vitals:    20 0556 20 0600 20 0700 20 0824   BP: 170/59      Pulse:  (!) 48 (!) 54 (!) 50   Resp:  15 (!) 25    Temp:  (!) 97 2 °F (36 2 °C) 97 5 °F (36 4 °C)    TempSrc:       SpO2:  99%  95%   Weight:  108 kg (237 lb 3 4 oz)     Height:         Temp (24hrs), Av 4 °F (36 3 °C), Min:96 8 °F (36 °C), Max:97 5 °F (36 4 °C)  Current: Temperature: 97 5 °F (36 4 °C)    Physical Exam   Constitutional: She appears distressed  Patient s/p right sided craniectomy   HENT:   Right Ear: External ear normal    Left Ear: External ear normal    S/p right craniectomy, no noted swelling, VIRGINIE drain to suction, subdural to gravity   Eyes: Pupils are equal, round, and reactive to light  Conjunctivae and EOM are normal  Right eye exhibits no discharge  Left eye exhibits no discharge  No scleral icterus  Neck: No tracheal deviation present  Cardiovascular: Regular rhythm  Exam reveals no gallop and no friction rub  Murmur (intermittent, v  soft, systolic) heard  Sinus bradycardia, faint distal pulses   Pulmonary/Chest: She has wheezes (expiratory > inspiratory)     Coarse breath sounds throughout Abdominal: She exhibits distension  She exhibits no mass  There is tenderness  There is no rebound and no guarding  Musculoskeletal: She exhibits no edema  Neurological: She is alert  She displays normal reflexes  No sensory deficit  She exhibits normal muscle tone  Skin: She is not diaphoretic     Psychiatric:   Frequent agitation in setting of ETT w/ intermittently adequate sedation       Respiratory:  SpO2: SpO2: 95 %, SpO2 Activity: SpO2 Activity: At Rest, SpO2 Device: O2 Device: Other (comment)(vent), Capnography:    O2 Flow Rate (L/min): 3 L/min    Invasive/non-invasive ventilation settings   Respiratory    Lab Data (Last 4 hours)    None         O2/Vent Data (Last 4 hours)      02/13 0824           Vent Mode CPAP/PS Spont       Patient safety screen outcome: Passed       FIO2 (%) (%) 50       PEEP (cmH2O) (cmH2O) 5       Pressure Support (cmH2O) (cmH20) 10       MV (Obs) 6 04       RSBI 30                 Laboratory and Diagnostics:  Results from last 7 days   Lab Units 02/13/20  0537 02/12/20  0626 02/11/20  1011 02/11/20  0806 02/10/20  2005   WBC Thousand/uL 13 76* 15 18*  --  13 51* 11 26*   HEMOGLOBIN g/dL 10 8* 10 0*  --  11 6 13 1   I STAT HEMOGLOBIN g/dl  --   --  9 9*  --   --    HEMATOCRIT % 34 8 30 2*  --  36 9 41 2   HEMATOCRIT, ISTAT %  --   --  29*  --   --    PLATELETS Thousands/uL 165 140*  --  182 193   NEUTROS PCT % 91*  --   --  89* 88*   MONOS PCT % 4  --   --  5 4     Results from last 7 days   Lab Units 02/13/20  0537 02/12/20  0626 02/11/20  2358 02/11/20  1011 02/11/20  0806 02/10/20  2005   SODIUM mmol/L 145 145 145  --  141 138   POTASSIUM mmol/L 3 7 3 9 3 2*  --  3 6 4 9   CHLORIDE mmol/L 111* 112* 109*  --  106 102   CO2 mmol/L 26 27 28  --  31 26   CO2, I-STAT mmol/L  --   --   --  28  --   --    ANION GAP mmol/L 8 6 8  --  4 10   BUN mg/dL 33* 25 22  --  21 24   CREATININE mg/dL 1 18 1 11 1 15  --  1 14 1 05   CALCIUM mg/dL 8 5 8 5 8 5  --  9 1 9 3   GLUCOSE RANDOM mg/dL 159* 151* 150*  --  128 118   ALT U/L  --   --   --   --  19 26   AST U/L  --   --   --   --  15 41   ALK PHOS U/L  --   --   --   --  132* 153*   ALBUMIN g/dL  --   --   --   --  3 1* 3 2*   TOTAL BILIRUBIN mg/dL  --   --   --   --  0 49 0 68     Results from last 7 days   Lab Units 02/12/20 0626 02/11/20 0604   MAGNESIUM mg/dL 2 1 2 0   PHOSPHORUS mg/dL 2 7 4 0      Results from last 7 days   Lab Units 02/10/20  2043   INR  1 13   PTT seconds 31      Results from last 7 days   Lab Units 02/10/20  2005   TROPONIN I ng/mL 0 05*         ABG:  Results from last 7 days   Lab Units 02/12/20 0626   PH ART  7 485*   PCO2 ART mm Hg 33 8*   PO2 ART mm Hg 84 0   HCO3 ART mmol/L 24 9   BASE EXC ART mmol/L 1 7   ABG SOURCE  Line, Arterial     VBG:  Results from last 7 days   Lab Units 02/12/20 0626   ABG SOURCE  Line, Arterial           Micro        EKG:   NSR, QT//480, right bundle branch block, T-wave abnormality consider inferior ischemia    Imaging:  CT head wo contrast [[id number]] Collected: 02/12/20 0619   CT BRAIN - WITHOUT CONTRAST INDICATION:   Status post craniotomy evacuation of hematoma  FINDINGS:  PARENCHYMA:  Interval postoperative changes of a right-sided decompressive craniectomy are present for a large right temporal hematoma evacuation   There is a drainage catheter with the tip located at the anterior medial right temporal fossa   Another   drainage catheter is present with the tip at the anterior margin of the craniectomy site  Karen Ramona has been significant interval decrease in size of the previously noted large right temporal intraparenchymal hematoma with a small amount of residual acute   hemorrhage noted at the posterior right temporal lobe   Mild vasogenic edema involving the right temporal lobe remains  Karen Ramona has been interval decrease in mass effect on the right lateral ventricle no midline shift  VENTRICLES AND EXTRA-AXIAL SPACES:  See above   No hydrocephalus    VISUALIZED ORBITS AND PARANASAL SINUSES:  Unremarkable  CALVARIUM AND EXTRACRANIAL SOFT TISSUES:  Normal    Impression:     Interval postoperative changes of right-sided decompressive craniectomy and evacuation of a large right temporal lobe intraparenchymal hematoma   Interval decrease in mass effect on the right lateral ventricle   No midline shift  No hydrocephalus  CT head wo contrast [[id number]] 02/11/20 0529   CT BRAIN - WITHOUT CONTRASTINDICATION:   Stroke, follow up  COMPARISON:  CT brain dated February 10, 2020 at 8:17 PM   FINDINGS:  PARENCHYMA:  Again noted is acute intraparenchymal hemorrhage involving the right temporal lobe with surrounding vasogenic edema and mass effect on the right lateral ventricular system   The size of this hematoma measures approximately 7 5 x 4 0 x 2 3 cm  (AP, transverse, cc), minimally increased in size from the prior exam   There is approximately 4 mm of right-to-left midline shift, stable from the prior exam   VENTRICLES AND EXTRA-AXIAL SPACES:  See above   No hydrocephalus  VISUALIZED ORBITS AND PARANASAL SINUSES:  Unremarkable  CALVARIUM AND EXTRACRANIAL SOFT TISSUES:  Normal    Impression:     Reidentified large acute intraparenchymal hemorrhage involving the right temporal lobe with mass effect on the right lateral ventricle and minimal to mild right to left midline shift  The size of this intraparenchymal hematoma has minimally increased from the prior exam  primarily in the AP dimension  CTA head and neck with and without contrast [[id number]]  02/10/20 2216   CTA NECK AND BRAIN WITH CONTRAST INDICATION: Neuro deficit, acute, stroke suspected acute intracranial hemorrhage  COMPARISON:   CT brain unenhanced performed approximately 1 hour prior    FINDINGS: Again noted is a large area of acute intracranial hemorrhage involving the right temporal lobe with mass effect on the right lateral ventricular system   There is approximately 5 mm of left-to-right midline shift, stable  CERVICAL VASCULATURE  AORTIC ARCH AND GREAT VESSELS:  Normal aortic arch and great vessel origins  Normal visualized subclavian vessels  RIGHT VERTEBRAL ARTERY CERVICAL SEGMENT:  Normal origin  The vessel is small in caliber throughout the neck  LEFT VERTEBRAL ARTERY CERVICAL SEGMENT:  Normal origin  The vessel is normal in caliber throughout the neck  RIGHT EXTRACRANIAL CAROTID SEGMENT:  Mild atherosclerotic disease of the distal common carotid artery and proximal cervical internal carotid artery without significant stenosis compared to the more distal ICA  LEFT EXTRACRANIAL CAROTID SEGMENT:  Mild atherosclerotic disease of the distal common carotid artery and proximal cervical internal carotid artery without significant stenosis compared to the more distal ICA  NASCET criteria was used to determine the degree of internal carotid artery diameter stenosis  INTRACRANIAL VASCULATURE   INTERNAL CAROTID ARTERIES:  Normal enhancement of the intracranial portions of the internal carotid arteries   There is atherosclerotic calcification of the bilateral cavernous internal carotid arteries   Normal ophthalmic artery origins   Normal ICA   terminus  ANTERIOR CIRCULATION:  Symmetric A1 segments and anterior cerebral arteries with normal enhancement   Normal anterior communicating artery  MIDDLE CEREBRAL ARTERY CIRCULATION:  M1 segment and middle cerebral artery branches demonstrate normal enhancement bilaterally  DISTAL VERTEBRAL ARTERIES: Dahiana Gamma is a dominant distal left vertebral artery   Posterior inferior cerebellar artery origins are normal  Normal vertebral basilar junction  BASILAR ARTERY:  Basilar artery is normal in caliber   Normal superior cerebellar arteries  POSTERIOR CEREBRAL ARTERIES: Both posterior cerebral arteries arises from the basilar tip   Both arteries demonstrate normal enhancement    Normal posterior communicating arteries    DURAL VENOUS SINUSES:  Normal   NON VASCULAR ANATOMY  BONY STRUCTURES:  No acute osseous abnormality  SOFT TISSUES OF THE NECK:  Normal  THORACIC INLET: Fayne Footman is a partially visualized left pleural effusion  Fayne Footman is interstitial prominence noted at both lung apices with scattered areas of groundglass density suspicious for mild pulmonary vascular congestion  Impression:     Reidentified large acute right temporal intraparenchymal hemorrhage with mass effect on the right lateral ventricular system and minimal to mild right to left midline shift  No focal stenosis or saccular aneurysm within the [place]  No hemodynamically significant stenosis within either common or internal carotid artery   Less than 50% stenosis by NASCET criteria  Partially visualized left pleural effusion  XR chest 1 view portable [[id number]] 02/11/20 0802   CHEST INDICATION:   htn  COMPARISON:  February 26, 2019  FINDINGS:  Top normal heart size   Calcified plaque within the arch   Mild central pulmonary vascular distention  The lungs are clear   No pneumothorax or pleural effusion  Osseous structures appear within normal limits for patient age  Impression:     Stable compared to prior  No acute cardiopulmonary disease  CT head without contrast [[id number]]  02/10/20 2025   CT BRAIN - WITHOUT CONTRAST  INDICATION:   Headache, acute, normal neuro exam   [de-identified]year-old female with elevated blood pressure, headache, nausea and vomiting  FINDINGS:  PARENCHYMA:  No intracranial mass  No CT signs of acute infarction   6 3 x 3 5 x 2 5 cm acute hemorrhage in the right temporal lobe with surrounding edema, effacement of the overlying cortical sulci and of the right sylvian fissure   There is mild mass   effect and compression of the right lateral ventricle   However, there is no midline shift and no subfalcine or uncal herniation   Hemorrhage volume approximately 29 mL  VENTRICLES AND EXTRA-AXIAL SPACES:  Narrowing of the right lateral ventricle, as mentioned above   Left lateral ventricle and 3rd and 4th ventricles unremarkable and unchanged from the CT from 2016   Mild effacement of the right side of the   perimesencephalic cistern   Basilar cisterns otherwise unremarkable   Suggestion of small chronic right frontal subdural collection, 2 mm in maximal thickness with no mass effect (series 2, image 21; series 400, images 29-37)  VISUALIZED ORBITS AND PARANASAL SINUSES:  Unremarkable  CALVARIUM AND EXTRACRANIAL SOFT TISSUES:  Normal    Impression:     1   6 3 x 3 5 x 2 5 cm acute intracerebral hemorrhage in the right temporal lobe with mild mass effect but no midline shift or herniation  2   Probable small chronic right frontal subdural collection, 2 mm in thickness  Intake and Output      Intake/Output Summary (Last 24 hours) at 2/13/2020 0855  Last data filed at 2/13/2020 0600  Gross per 24 hour   Intake 1083 74 ml   Output 1801 ml   Net -717 26 ml     I/O       02/10 0701 - 02/11 0700 02/11 0701 - 02/12 0700    I V  (mL/kg) 501 5 (5 3) 2704 3 (28 5)    NG/GT  90    IV Piggyback  700    Total Intake(mL/kg) 501 5 (5 3) 3494 3 (36 9)    Urine (mL/kg/hr) 1000 2265 (1)    Emesis/NG output  100    Drains  140    Blood  150    Total Output 1000 2655    Net -498 5 +839 3              Height and Weights   Height: 5' 1" (154 9 cm)  IBW: 47 8 kg  Body mass index is 44 82 kg/m²  Weight (last 2 days)     Date/Time   Weight    02/13/20 0600   108 (237 22)    02/11/20 1548   94 8 (209)    02/11/20 0600   94 8 (209)            Nutrition       Diet Orders   (From admission, onward)             Start     Ordered    02/11/20 1224  Diet NPO  Diet effective now     Question Answer Comment   Diet Type NPO    RD to adjust diet per protocol?  Yes        02/11/20 1223              If can extubate may give diet    Active Medications  Scheduled Meds:    Current Facility-Administered Medications:  atorvastatin 40 mg Oral QPM Artemio Clemens MD    bisacodyl 10 mg Rectal Daily PRN Tg Block Manav Finnegan MD    chlorhexidine 15 mL Swish & Spit Q12H Dotty Bhagat MD    dexamethasone 4 mg Intravenous Q6H Dotty Bhagat MD    dexmedetomidine 0 1-0 7 mcg/kg/hr Intravenous Titrated Geovanna Rodriguez MD Last Rate: 0 7 mcg/kg/hr (02/13/20 0324)   docusate sodium 100 mg Oral BID Rell Archer MD    fentanyl citrate (PF) 50 mcg Intravenous Q1H PRN Geovanna Rodriguez MD    fentanyl citrate (PF) 50 mcg Intravenous Q1H PRN Dmitry GARSIA Paster,     Labetalol HCl 10 mg Intravenous Q4H PRN Geovanna Rodriguez MD    levETIRAcetam 500 mg Oral Q12H Dotty Bhagat MD    levothyroxine 112 mcg Oral Early Morning Shani Haq MD    ondansetron 4 mg Intravenous Q4H PRN Rell Archer MD    pantoprazole 40 mg Oral Early Morning Shani Haq MD      Continuous Infusions:    dexmedetomidine 0 1-0 7 mcg/kg/hr Last Rate: 0 7 mcg/kg/hr (02/13/20 0324)     PRN Meds:     bisacodyl 10 mg Daily PRN   fentanyl citrate (PF) 50 mcg Q1H PRN   fentanyl citrate (PF) 50 mcg Q1H PRN   Labetalol HCl 10 mg Q4H PRN   ondansetron 4 mg Q4H PRN     Allergies   No Known Allergies  ---------------------------------------------------------------------------------------  Advance Directive and Living Will:      Power of :    POLST:    ---------------------------------------------------------------------------------------    [id number] [facility] [employer] Student, Class 2020  [id number] at [address] [facility]    Portions of the record may have been created with voice recognition software  Occasional wrong word or "sound a like" substitutions may have occurred due to the inherent limitations of voice recognition software    Read the chart carefully and recognize, using context, where substitutions have occurred

## 2020-02-13 NOTE — PROGRESS NOTES
Critical Care Transfer Summary - Gómez Tejada [de-identified] y o  female MRN: [id number]    [facility] ICU Room / Bed: ICU 02/ICU 02 Encounter: [id number]    BRIEF OVERVIEW    Admitting Provider: Shiela Umanzor MD  Transfer Provider: Rand Rodgers DO   Primary Care Physician at Discharge: Dr Juani Farmer    Transfer To:  [facility]    Admission Date: 2/10/2020     Transfer Date: 2/14/2020  Primary Transfer Diagnosis  Principal Problem:    Intraparenchymal hemorrhage of brain [facility])  Active Problems:    Benign essential hypertension    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) ([facility])    Hypothyroidism    CKD (chronic kidney disease), stage III ([facility] [address] )    Hyperlipemia    Morbid obesity with body mass index of 40 0-44 9 in adult ([facility])    WENDI (obstructive sleep apnea)    Gastroesophageal reflux disease without esophagitis  Resolved Problems:    * No resolved hospital problems   *      Consulting Providers   Neurosurgery following     Therapeutic Operative Procedures Performed  2/11:  Emergent right-sided decompressive craniectomy and evacuation of hematoma Ileana Click)    Diagnostic Procedures Performed  none    Transfer Disposition: [address]  Transferred With Lines: no   Peripheral IV access  Test Results Pending at Transfer:    MRI w,wo/MRV    Outpatient Follow-Up  PCP and Neurosurgery  Follow up with consulting providers  Per primary team and Neurosurgery  Active Issues Requiring Follow-up   Per primary team and Neurosurgery    Code Status: Level 3 - DNAR and DNI    Allergies  No Known Allergies  Discharge Diet: regular diet,  Limit sodium 2 g,  Ensure clear apple, Ensure Clear very  Activity restrictions: Per primary team and Neurosurgery    [address] [place] is a [de-identified] y o  female with medical history hypothyroidism, pt systolic diastolic heart failure, hypertension presented with headache, nausea, and altered mental status to [facility] 2/10  Patient reported having headache earlier during weekends and 1 episode family at bedside reported altered mental status significantly elevated blood pressure at home  CTH showed acute intraparenchymal hemorrhage of the right temporal lobe and patient was transferred to [facility] for further evaluation, given 25 g of 20% mannitol en route  [facility] stroke pathway, with goal -150    2/11:  acute change in mental status w/ enlargement of intraparenchymal hematoma w/ early signs of uncal herniation by CT, underwent emergent decompressive craniotomy and evacuation intraparenchymal hematoma  2/12:  Patient alert and cooperative with exam,  intermittently combative indicating desire for ETT removal - no leak and was unable to extubate  F/u imaging showed significantly reduced bleed size, edema, and resolved midline shift  2/13:  Patient successfully extubated with minimal residual focal neurological deficits, alert and oriented, BUE/BLE strength against resistance, PERRL, EOMI  2/14:  Intracranial drains removed, patient tolerated well  Goal SBP < 140    Presenting Problem/History of Present Illness  Principal Problem:    Intraparenchymal hemorrhage of brain (HCC)  Active Problems:    Benign essential hypertension    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)    Hypothyroidism    CKD (chronic kidney disease), stage III (HCC)    Hyperlipemia    Morbid obesity with body mass index of 40 0-44 9 in adult (HCC)    WENDI (obstructive sleep apnea)    Gastroesophageal reflux disease without esophagitis  Resolved Problems:    * No resolved hospital problems   *  ·   Intraparenchymal Hemorrgage  · CTH:  Acute ICH in the right temporal lobe with mild mass effect but no significant midline shift or herniation, small chronic right frontal subdural collection 2 mm  · CTA:  Large acute right temporal intraparenchymal hemorrhage with mass effect on the right lateral ventricle, minimal to mild right-to-left midline shift, no focal stenosis or saccular aneurysm within the [place], no hemodynamically significant stenosis in either ICA, less than 50% stenosis by NASCET criteria  ? Atorvastatin 40 mg QD   ? Keppra 500 mg q12h day 3/7  ? Q1h neuro checks  ? Goal SBP < 140  ? Neurology and neurosurgery following, appreciate recommendations  ? Will get MRI/MRV   · Pain  ? Will start p r n  Tylenol and oxycodone 2 5 for pain  · Continue regulate sleep-wake cycle:  Minimize daytime naps  Give melatonin bedtime    Reported History of combined diastolic systolic heart failure  ? Echo 2/12 left ventricular ejection fraction 65%, increased wall thickness, moderate to severe MR, elevated PA pressure  ? Continue Bumex 2 mg daily, held today as patient was mostly NPO and will resume tomorrow  ? Net negative 1 3 L 24 hours  ? Continue to monitor intake output  Hypertension   ? On home losartan 25 mg twice a day  ? Currently on amlodipine 10 mg daily and Bumex 2 mg daily and will add Losartan 25 mg daily  ? Received IV labetalol and hydralazine overnight  ? Goal SBP <150    Hyperlipidemia  ? Continue atorvastatin 40 mg daily    WENDI on BiPaP  ? Place nasal cannula at bedtime  Avoid BiPAP as patient has right craniectomy    GERD   Plan: continue home Protonix 40     History chronic kidney disease  ? BUN 37, creatinine 1 06  ? Patient with no IV fluids or p o  Intake at this time  ? Continue Bumex 2 mg daily if feeding starts  ? Monitor BMP  ? Discontinue Carlson catheter today      Leukocytosis   ? Leukocytosis increased from 13 to 20  ? Remains afebrile  ? Likely secondary to Decadron  ? Will monitor for now with daily CBC and fever trend  ? Will start DVT prophylaxis with heparin per Neurosurgery today    History of hypothyroidism  ?  Continue home levothyroxine 112 mcg    Other Pertinent Test Results  2/12: CTH  Postoperative changes of right-sided decompressive craniectomy and evacuation of a large right temporal lobe intraparenchymal hematoma, interval decrease in mass effect on the right lateral ventricle and resolution of midline shift, no hydrocephalus noted  2/11: CTH   7 5 x 4 0 x 2 3 cm  Intraparenchymal hemorrhage of the right temporal lobe reidentified - acutely increased size primarily in the AP dimension, early signs of uncal herniation  2/10:  CTH 6 3 x 3 5 x 2 5 cm acute intracerebral hemorrhage in the right temporal lobe with mild mass effect but no midline shift or herniation, small chronic right frontal subdural collection 2 mm in thickness;  CTA  Large acute right temporal intraparenchymal hemorrhage with mass effect in the right lateral ventricular system and mild right to left midline shift,  No focal stenosis or saccular aneurysm, no hemodynamically significant stenosis in either common or internal carotid artery (under 50% stenosis by NASCET criteria)    Transfer Condition: stable

## 2020-02-13 NOTE — RESPIRATORY THERAPY NOTE
RT Ventilator Management Note  Irene Ochoa [de-identified] y o  female MRN: [id number]  Unit/Bed#: ICU 02 Encounter: [id number]      Daily Screen       2/11/2020  1204             Patient safety screen outcome[de-identified]  Failed    Not Ready for Weaning due to[de-identified]              Physical Exam:   Assessment Type: Assess only  General Appearance: Sedated  Respiratory Pattern: Assisted  Chest Assessment: Chest expansion symmetrical  Bilateral Breath Sounds: Clear  R Breath Sounds: Diminished  L Breath Sounds: Diminished  Cough: None  Suction: ET Tube(PRN)  O2 Device: vent      Resp Comments: Pt assessed for potential extubation  Pt w/o cuff leak test   Dr Lilli Romeo, considering potential airway problens, electing to ventilate overnight, and prepare for conpilcated extubation  Currently pt on PSV mode  Iesha well  BS clear  SpO2 98%  RSBI=51  Plan to continue PSV mode overnight

## 2020-02-13 NOTE — PROGRESS NOTES
Progress Note - Irene Ochoa 1940, [de-identified] y o  female MRN: [id number]    Unit/Bed#: ICU 02 Encounter: [id number]    Primary Care Provider: Rolando Snow MD   Date and time admitted to hospital: 2/10/2020 10:18 PM        * Intraparenchymal hemorrhage of brain [facility])  Assessment & Plan  POD2 Right decompressive craniectomy and evacuation of intraparenchymal hematoma   · ICH 1 on initial presentation, ICH 2 prior to OR with repeat CT head (increase in volume)  · Patient with exam decline, leading to surgical decompression  · Patient intubated, on fentanyl and precedex  Failed extubation wean yesterday - no cuff leak  Retry today   · GCS 10T (E3, V1, M6) - Exam improved today  · Codman ICP monitor (0-4), VIRGINIE drain (200CC), SDD to 0mmHg (55cc)    Imaging:  · CT head w/o, 2/11/20: acute intraparenchymal hemorrhage involving the right temporal lobe with surrounding vasogenic edema and mass effect on the right lateral ventricular system  The size of this hematoma measures approximately 7 5 x 4 0 x 2 3 cm, minimally increased in size from the prior exam   There is approximately 4 mm of right-to-left midline shift, stable from the prior exam   · CT head w/o, 2/12/20: Interval postoperative changes of right-sided decompressive craniectomy and evacuation of a large right temporal lobe intraparenchymal hematoma  Interval decrease in mass effect on the right lateral ventricle  No midline shift  Plan:  · Continue to monitor drain outputs  · Monitor ICPs (0-4 today, 0 in room)  · Keppra 500mg BID x7 days  · SBP <140  · PT/OT/SLP  · Neurology following for stroke workup  · Wean to extubate when able; re-attempt extubation today  · Decadron for presumed airway edema  · Pain control per primary team  · DVT ppx: SCDs, ok to start [facility]  · Patient will need helmet when extubated    Neurosurgery will continue to follow at this time  Please don't hesitate to contact us with any additional questions        Subjective/Objective Subjective: Patient with NAEO  Remains intubated    Objective: Could not extubate yesterday because there was no air leak  Retrial today after decadron for presumed airway edema; may switch to smaller ET tube if unable to extubate  More alert today, following commands  Craniectomy site soft  Intake/Output       02/13/20 0701 - 02/14/20 0700      [id number] Total       Intake    I V   50 1  -- 50 1    Total Intake 50 1 -- 50 1       Output    Total Output -- -- --       Net I/O     50 1 -- 50 1          Invasive Devices     Peripheral Intravenous Line            Peripheral IV 02/12/20 Left Forearm 1 day          Arterial Line            Arterial Line 02/11/20 Radial 2 days          Drain            Closed/Suction Drain Right Head Bulb 2 days    ICP Device ICP microsensor fiber Right Frontal region 2 days    NG/OG/Enteral Tube Orogastric Center mouth 2 days    Urethral Catheter Non-latex; Temperature probe 14 Fr  2 days    Ventriculostomy/Subdural Subdural drainage catheter Right Temporal region 2 days          Airway            ETT  Cuffed; Hi-Lo 7 mm 2 days                Vitals: Blood pressure 170/59, pulse (!) 46, temperature (!) 97 2 °F (36 2 °C), resp  rate 12, height 5' 1" (1 549 m), weight 108 kg (237 lb 3 4 oz), SpO2 95 %, not currently breastfeeding  ,Body mass index is 44 82 kg/m²  Hemodynamic Monitoring: MAP: Arterial Line MAP (mmHg): 68 mmHg, ICP Mean: ICP Mean (mmHg): 0 mmHg     General appearance: alert, appears stated age, cooperative and no distress  Head: Normocephalic, craniectomy flap soft, incision CDI covered with telfa  VIRGINIE drain, SDD at 0mmHg, codman ICP monitor  Eyes: EOMI, PERRL   Opens eyes to voice  Neck: supple, symmetrical, trachea midline and NT  Back: no kyphosis present, no tenderness to percussion or palpation  Lungs: non labored breathing  Heart: bradycardic  Neurologic:   Mental status: Alert, GCS 10t (E3, V1, M6)  Cranial nerves: grossly intact (Cranial nerves II-XII)  Sensory: normal to LT bilaterally  Motor: moving all extremities on command, squeezes hands and gives thumbs up bilaterally, wiggles toes  Reflexes: 2+ and symmetric, no hoffmans or clonus    Lab Results: I have personally reviewed pertinent results  Results from last 7 days   Lab Units 02/13/20  0537 02/12/20  0626 02/11/20  1011 02/11/20  0806 02/10/20  2005   WBC Thousand/uL 13 76* 15 18*  --  13 51* 11 26*   HEMOGLOBIN g/dL 10 8* 10 0*  --  11 6 13 1   I STAT HEMOGLOBIN g/dl  --   --  9 9*  --   --    HEMATOCRIT % 34 8 30 2*  --  36 9 41 2   HEMATOCRIT, ISTAT %  --   --  29*  --   --    PLATELETS Thousands/uL 165 140*  --  182 193   NEUTROS PCT % 91*  --   --  89* 88*   MONOS PCT % 4  --   --  5 4     Results from last 7 days   Lab Units 02/13/20  0537 02/12/20  0626 02/11/20  2358 02/11/20  1011 02/11/20  0806 02/10/20  2005   POTASSIUM mmol/L 3 7 3 9 3 2*  --  3 6 4 9   CHLORIDE mmol/L 111* 112* 109*  --  106 102   CO2 mmol/L 26 27 28  --  31 26   CO2, I-STAT mmol/L  --   --   --  28  --   --    BUN mg/dL 33* 25 22  --  21 24   CREATININE mg/dL 1 18 1 11 1 15  --  1 14 1 05   CALCIUM mg/dL 8 5 8 5 8 5  --  9 1 9 3   ALK PHOS U/L  --   --   --   --  132* 153*   ALT U/L  --   --   --   --  19 26   AST U/L  --   --   --   --  15 41   GLUCOSE, ISTAT mg/dl  --   --   --  124  --   --      Results from last 7 days   Lab Units 02/12/20  0626 02/11/20  0604   MAGNESIUM mg/dL 2 1 2 0     Results from last 7 days   Lab Units 02/12/20  0626 02/11/20  0604   PHOSPHORUS mg/dL 2 7 4 0     Results from last 7 days   Lab Units 02/10/20  2043   INR  1 13   PTT seconds 31     No results found for: TROPONINT  ABG:  Lab Results   Component Value Date    PHART 7 485 (H) 02/12/2020    DQV9XOG 33 8 (L) 02/12/2020    PO2ART 84 0 02/12/2020    LKB5RDR 24 9 02/12/2020    BEART 1 7 02/12/2020    SOURCE Line, Arterial 02/12/2020       Imaging Studies: I have personally reviewed pertinent reports     and I have personally reviewed pertinent films in PACS     Cta Head And Neck With And Without Contrast    Result Date: 2/10/2020  Narrative: CTA NECK AND BRAIN WITH CONTRAST INDICATION: Neuro deficit, acute, stroke suspected acute intracranial hemorrhage  COMPARISON:   CT brain unenhanced performed approximately 1 hour prior  TECHNIQUE: Post contrast imaging was performed after administration of iodinated contrast through the neck and brain  Post contrast axial 0 625 mm images timed to opacify the arterial system  3D rendering was performed on an independent workstation  MIP reconstructions performed  Coronal reconstructions were performed of the noncontrast portion of the brain  Radiation dose length product (DLP) for this visit:  448 mGy-cm   This examination, like all CT scans performed in the [facility], was performed utilizing techniques to minimize radiation dose exposure, including the use of iterative reconstruction and automated exposure control  IV Contrast:  85 mL of iohexol (OMNIPAQUE)  IMAGE QUALITY:   Diagnostic FINDINGS: Again noted is a large area of acute intracranial hemorrhage involving the right temporal lobe with mass effect on the right lateral ventricular system  There is approximately 5 mm of left-to-right midline shift, stable  CERVICAL VASCULATURE AORTIC ARCH AND GREAT VESSELS:  Normal aortic arch and great vessel origins  Normal visualized subclavian vessels  RIGHT VERTEBRAL ARTERY CERVICAL SEGMENT:  Normal origin  The vessel is small in caliber throughout the neck  LEFT VERTEBRAL ARTERY CERVICAL SEGMENT:  Normal origin  The vessel is normal in caliber throughout the neck  RIGHT EXTRACRANIAL CAROTID SEGMENT:  Mild atherosclerotic disease of the distal common carotid artery and proximal cervical internal carotid artery without significant stenosis compared to the more distal ICA   LEFT EXTRACRANIAL CAROTID SEGMENT:  Mild atherosclerotic disease of the distal common carotid artery and proximal cervical internal carotid artery without significant stenosis compared to the more distal ICA  NASCET criteria was used to determine the degree of internal carotid artery diameter stenosis  INTRACRANIAL VASCULATURE INTERNAL CAROTID ARTERIES:  Normal enhancement of the intracranial portions of the internal carotid arteries  There is atherosclerotic calcification of the bilateral cavernous internal carotid arteries  Normal ophthalmic artery origins  Normal ICA terminus  ANTERIOR CIRCULATION:  Symmetric A1 segments and anterior cerebral arteries with normal enhancement  Normal anterior communicating artery  MIDDLE CEREBRAL ARTERY CIRCULATION:  M1 segment and middle cerebral artery branches demonstrate normal enhancement bilaterally  DISTAL VERTEBRAL ARTERIES:  There is a dominant distal left vertebral artery  Posterior inferior cerebellar artery origins are normal  Normal vertebral basilar junction  BASILAR ARTERY:  Basilar artery is normal in caliber  Normal superior cerebellar arteries  POSTERIOR CEREBRAL ARTERIES: Both posterior cerebral arteries arises from the basilar tip  Both arteries demonstrate normal enhancement  Normal posterior communicating arteries  DURAL VENOUS SINUSES:  Normal  NON VASCULAR ANATOMY BONY STRUCTURES:  No acute osseous abnormality  SOFT TISSUES OF THE NECK:  Normal  THORACIC INLET:  There is a partially visualized left pleural effusion  There is interstitial prominence noted at both lung apices with scattered areas of groundglass density suspicious for mild pulmonary vascular congestion  Impression: Reidentified large acute right temporal intraparenchymal hemorrhage with mass effect on the right lateral ventricular system and minimal to mild right to left midline shift  No focal stenosis or saccular aneurysm within the [place]  No hemodynamically significant stenosis within either common or internal carotid artery  Less than 50% stenosis by NASCET criteria  Partially visualized left pleural effusion  Workstation performed: [id number]     Xr Chest 1 View Portable    Result Date: 2/11/2020  Narrative: CHEST INDICATION:   htn  COMPARISON:  February 26, 2019 EXAM PERFORMED/VIEWS:  XR CHEST PORTABLE FINDINGS: Top normal heart size  Calcified plaque within the arch  Mild central pulmonary vascular distention  The lungs are clear  No pneumothorax or pleural effusion  Osseous structures appear within normal limits for patient age  Impression: Stable compared to prior  No acute cardiopulmonary disease  Workstation performed: [id number]     Ct Head Wo Contrast    Result Date: 2/12/2020  Narrative: CT BRAIN - WITHOUT CONTRAST INDICATION:   Status post craniotomy evacuation of hematoma  COMPARISON:  CT brain dated February 11, 2020 at 5:00 AM TECHNIQUE:  CT examination of the brain was performed  In addition to axial images, coronal 2D reformatted images were created and submitted for interpretation  Radiation dose length product (DLP) for this visit:  859 28 mGy-cm   This examination, like all CT scans performed in the [facility], was performed utilizing techniques to minimize radiation dose exposure, including the use of iterative  reconstruction and automated exposure control  IMAGE QUALITY:  Diagnostic  FINDINGS: PARENCHYMA:  Interval postoperative changes of a right-sided decompressive craniectomy are present for a large right temporal hematoma evacuation  There is a drainage catheter with the tip located at the anterior medial right temporal fossa  Another drainage catheter is present with the tip at the anterior margin of the craniectomy site  There has been significant interval decrease in size of the previously noted large right temporal intraparenchymal hematoma with a small amount of residual acute hemorrhage noted at the posterior right temporal lobe  Mild vasogenic edema involving the right temporal lobe remains    There has been interval decrease in mass effect on the right lateral ventricle no midline shift  VENTRICLES AND EXTRA-AXIAL SPACES:  See above  No hydrocephalus  VISUALIZED ORBITS AND PARANASAL SINUSES:  Unremarkable  CALVARIUM AND EXTRACRANIAL SOFT TISSUES:  Normal      Impression: Interval postoperative changes of right-sided decompressive craniectomy and evacuation of a large right temporal lobe intraparenchymal hematoma  Interval decrease in mass effect on the right lateral ventricle  No midline shift  No hydrocephalus  Workstation performed: [id number]     Ct Head Wo Contrast    Result Date: 2/11/2020  Narrative: CT BRAIN - WITHOUT CONTRAST INDICATION:   Stroke, follow up  COMPARISON:  CT brain dated February 10, 2020 at 8:17 PM  TECHNIQUE:  CT examination of the brain was performed  In addition to axial images, coronal 2D reformatted images were created and submitted for interpretation  Radiation dose length product (DLP) for this visit:  1724 71 mGy-cm   This examination, like all CT scans performed in the [facility], was performed utilizing techniques to minimize radiation dose exposure, including the use of iterative reconstruction and automated exposure control  IMAGE QUALITY:  Diagnostic  FINDINGS: PARENCHYMA:  Again noted is acute intraparenchymal hemorrhage involving the right temporal lobe with surrounding vasogenic edema and mass effect on the right lateral ventricular system  The size of this hematoma measures approximately 7 5 x 4 0 x 2 3 cm  (AP, transverse, cc), minimally increased in size from the prior exam   There is approximately 4 mm of right-to-left midline shift, stable from the prior exam  VENTRICLES AND EXTRA-AXIAL SPACES:  See above  No hydrocephalus  VISUALIZED ORBITS AND PARANASAL SINUSES:  Unremarkable   CALVARIUM AND EXTRACRANIAL SOFT TISSUES:  Normal      Impression: Reidentified large acute intraparenchymal hemorrhage involving the right temporal lobe with mass effect on the right lateral ventricle and minimal to mild right to left midline shift  The size of this intraparenchymal hematoma has minimally increased from the prior exam  primarily in the AP dimension  Workstation performed: [id number]     Ct Head Without Contrast    Result Date: 2/10/2020  Narrative: CT BRAIN - WITHOUT CONTRAST INDICATION:   Headache, acute, normal neuro exam   62-year-old female with elevated blood pressure, headache, nausea and vomiting  COMPARISON:  None  TECHNIQUE:  CT examination of the brain was performed  In addition to axial images, coronal 2D reformatted images were created and submitted for interpretation  Radiation dose length product (DLP) for this visit:  816 mGy-cm   This examination, like all CT scans performed in the [facility], was performed utilizing techniques to minimize radiation dose exposure, including the use of iterative reconstruction and automated exposure control  IMAGE QUALITY:  Diagnostic  FINDINGS: PARENCHYMA:  No intracranial mass  No CT signs of acute infarction  6 3 x 3 5 x 2 5 cm acute hemorrhage in the right temporal lobe with surrounding edema, effacement of the overlying cortical sulci and of the right sylvian fissure  There is mild mass effect and compression of the right lateral ventricle  However, there is no midline shift and no subfalcine or uncal herniation  Hemorrhage volume approximately 29 mL  VENTRICLES AND EXTRA-AXIAL SPACES:  Narrowing of the right lateral ventricle, as mentioned above  Left lateral ventricle and 3rd and 4th ventricles unremarkable and unchanged from the CT from 2016  Mild effacement of the right side of the perimesencephalic cistern  Basilar cisterns otherwise unremarkable  Suggestion of small chronic right frontal subdural collection, 2 mm in maximal thickness with no mass effect (series 2, image 21; series 400, images 29-37)  VISUALIZED ORBITS AND PARANASAL SINUSES:  Unremarkable   CALVARIUM AND EXTRACRANIAL SOFT TISSUES:  Normal      Impression: 1   6 3 x 3 5 x 2 5 cm acute intracerebral hemorrhage in the right temporal lobe with mild mass effect but no midline shift or herniation  2   Probable small chronic right frontal subdural collection, 2 mm in thickness  I personally discussed this study with Miriam Hoyt on 2/10/2020 at 8:30 PM  Workstation performed: [id number]     EKG, Pathology, and Other Studies: I have personally reviewed pertinent reports        VTE Pharmacologic Prophylaxis: Heparin    VTE Mechanical Prophylaxis: sequential compression device

## 2020-02-14 PROBLEM — K21.9 GASTROESOPHAGEAL REFLUX DISEASE WITHOUT ESOPHAGITIS: Chronic | Status: ACTIVE | Noted: 2018-06-04

## 2020-02-14 PROBLEM — N18.30 CKD (CHRONIC KIDNEY DISEASE), STAGE III (HCC): Chronic | Status: ACTIVE | Noted: 2017-05-19

## 2020-02-14 LAB
ABO GROUP BLD BPU: NORMAL
ABO GROUP BLD BPU: NORMAL
ANION GAP SERPL CALCULATED.3IONS-SCNC: 6 MMOL/L (ref 4–13)
ANISOCYTOSIS BLD QL SMEAR: PRESENT
BASOPHILS # BLD MANUAL: 0 THOUSAND/UL (ref 0–0.1)
BASOPHILS NFR MAR MANUAL: 0 % (ref 0–1)
BPU ID: NORMAL
BPU ID: NORMAL
BUN SERPL-MCNC: 37 MG/DL (ref 5–25)
CALCIUM SERPL-MCNC: 8.9 MG/DL (ref 8.3–10.1)
CHLORIDE SERPL-SCNC: 114 MMOL/L (ref 100–108)
CO2 SERPL-SCNC: 28 MMOL/L (ref 21–32)
CREAT SERPL-MCNC: 1.06 MG/DL (ref 0.6–1.3)
CROSSMATCH: NORMAL
CROSSMATCH: NORMAL
EOSINOPHIL # BLD MANUAL: 0 THOUSAND/UL (ref 0–0.4)
EOSINOPHIL NFR BLD MANUAL: 0 % (ref 0–6)
ERYTHROCYTE [DISTWIDTH] IN BLOOD BY AUTOMATED COUNT: 16.1 % (ref 11.6–15.1)
GFR SERPL CREATININE-BSD FRML MDRD: 50 ML/MIN/1.73SQ M
GLUCOSE SERPL-MCNC: 130 MG/DL (ref 65–140)
HCT VFR BLD AUTO: 34.8 % (ref 34.8–46.1)
HGB BLD-MCNC: 11.1 G/DL (ref 11.5–15.4)
LYMPHOCYTES # BLD AUTO: 0.41 THOUSAND/UL (ref 0.6–4.47)
LYMPHOCYTES # BLD AUTO: 2 % (ref 14–44)
MAGNESIUM SERPL-MCNC: 2.5 MG/DL (ref 1.6–2.6)
MCH RBC QN AUTO: 27.8 PG (ref 26.8–34.3)
MCHC RBC AUTO-ENTMCNC: 31.9 G/DL (ref 31.4–37.4)
MCV RBC AUTO: 87 FL (ref 82–98)
MONOCYTES # BLD AUTO: 0.82 THOUSAND/UL (ref 0–1.22)
MONOCYTES NFR BLD: 4 % (ref 4–12)
NEUTROPHILS # BLD MANUAL: 18.98 THOUSAND/UL (ref 1.85–7.62)
NEUTS SEG NFR BLD AUTO: 93 % (ref 43–75)
NRBC BLD AUTO-RTO: 0 /100 WBCS
PHOSPHATE SERPL-MCNC: 3.3 MG/DL (ref 2.3–4.1)
PLATELET # BLD AUTO: 209 THOUSANDS/UL (ref 149–390)
PLATELET BLD QL SMEAR: ADEQUATE
PMV BLD AUTO: 11.8 FL (ref 8.9–12.7)
POLYCHROMASIA BLD QL SMEAR: PRESENT
POTASSIUM SERPL-SCNC: 3.6 MMOL/L (ref 3.5–5.3)
RBC # BLD AUTO: 3.99 MILLION/UL (ref 3.81–5.12)
RBC MORPH BLD: PRESENT
SODIUM SERPL-SCNC: 148 MMOL/L (ref 136–145)
UNIT DISPENSE STATUS: NORMAL
UNIT DISPENSE STATUS: NORMAL
UNIT PRODUCT CODE: NORMAL
UNIT PRODUCT CODE: NORMAL
UNIT RH: NORMAL
UNIT RH: NORMAL
VARIANT LYMPHS # BLD AUTO: 1 %
WBC # BLD AUTO: 20.41 THOUSAND/UL (ref 4.31–10.16)

## 2020-02-14 PROCEDURE — 85027 COMPLETE CBC AUTOMATED: CPT | Performed by: PHYSICIAN ASSISTANT

## 2020-02-14 PROCEDURE — 83735 ASSAY OF MAGNESIUM: CPT | Performed by: PHYSICIAN ASSISTANT

## 2020-02-14 PROCEDURE — 97163 PT EVAL HIGH COMPLEX 45 MIN: CPT

## 2020-02-14 PROCEDURE — 99024 POSTOP FOLLOW-UP VISIT: CPT | Performed by: PHYSICIAN ASSISTANT

## 2020-02-14 PROCEDURE — 80048 BASIC METABOLIC PNL TOTAL CA: CPT | Performed by: PHYSICIAN ASSISTANT

## 2020-02-14 PROCEDURE — 92610 EVALUATE SWALLOWING FUNCTION: CPT

## 2020-02-14 PROCEDURE — 85007 BL SMEAR W/DIFF WBC COUNT: CPT | Performed by: PHYSICIAN ASSISTANT

## 2020-02-14 PROCEDURE — 84100 ASSAY OF PHOSPHORUS: CPT | Performed by: PHYSICIAN ASSISTANT

## 2020-02-14 PROCEDURE — 97167 OT EVAL HIGH COMPLEX 60 MIN: CPT

## 2020-02-14 PROCEDURE — 99233 SBSQ HOSP IP/OBS HIGH 50: CPT | Performed by: INTERNAL MEDICINE

## 2020-02-14 RX ORDER — POTASSIUM CHLORIDE 20MEQ/15ML
40 LIQUID (ML) ORAL ONCE
Status: COMPLETED | OUTPATIENT
Start: 2020-02-14 | End: 2020-02-14

## 2020-02-14 RX ORDER — POLYETHYLENE GLYCOL 3350 17 G/17G
17 POWDER, FOR SOLUTION ORAL DAILY PRN
Status: DISCONTINUED | OUTPATIENT
Start: 2020-02-14 | End: 2020-02-18 | Stop reason: HOSPADM

## 2020-02-14 RX ORDER — LIDOCAINE HYDROCHLORIDE 10 MG/ML
5 INJECTION, SOLUTION EPIDURAL; INFILTRATION; INTRACAUDAL; PERINEURAL ONCE
Status: COMPLETED | OUTPATIENT
Start: 2020-02-14 | End: 2020-02-14

## 2020-02-14 RX ORDER — OXYCODONE HYDROCHLORIDE 5 MG/1
2.5 TABLET ORAL EVERY 6 HOURS PRN
Status: DISCONTINUED | OUTPATIENT
Start: 2020-02-14 | End: 2020-02-18 | Stop reason: HOSPADM

## 2020-02-14 RX ORDER — ACETAMINOPHEN 325 MG/1
650 TABLET ORAL EVERY 6 HOURS PRN
Status: DISCONTINUED | OUTPATIENT
Start: 2020-02-14 | End: 2020-02-18 | Stop reason: HOSPADM

## 2020-02-14 RX ORDER — BUMETANIDE 2 MG/1
2 TABLET ORAL DAILY
Status: DISCONTINUED | OUTPATIENT
Start: 2020-02-15 | End: 2020-02-18 | Stop reason: HOSPADM

## 2020-02-14 RX ORDER — POTASSIUM CHLORIDE 20MEQ/15ML
20 LIQUID (ML) ORAL ONCE
Status: DISCONTINUED | OUTPATIENT
Start: 2020-02-14 | End: 2020-02-14 | Stop reason: SDUPTHER

## 2020-02-14 RX ORDER — AMLODIPINE BESYLATE 10 MG/1
10 TABLET ORAL DAILY
Status: DISCONTINUED | OUTPATIENT
Start: 2020-02-14 | End: 2020-02-18 | Stop reason: HOSPADM

## 2020-02-14 RX ORDER — BISACODYL 10 MG
10 SUPPOSITORY, RECTAL RECTAL DAILY PRN
Status: DISCONTINUED | OUTPATIENT
Start: 2020-02-14 | End: 2020-02-18 | Stop reason: HOSPADM

## 2020-02-14 RX ORDER — HEPARIN SODIUM 5000 [USP'U]/ML
7500 INJECTION, SOLUTION INTRAVENOUS; SUBCUTANEOUS EVERY 8 HOURS SCHEDULED
Status: DISCONTINUED | OUTPATIENT
Start: 2020-02-14 | End: 2020-02-18 | Stop reason: HOSPADM

## 2020-02-14 RX ORDER — LOSARTAN POTASSIUM 25 MG/1
25 TABLET ORAL DAILY
Status: DISCONTINUED | OUTPATIENT
Start: 2020-02-14 | End: 2020-02-18 | Stop reason: HOSPADM

## 2020-02-14 RX ADMIN — LEVETIRACETAM 500 MG: 500 TABLET, FILM COATED ORAL at 08:51

## 2020-02-14 RX ADMIN — LEVOTHYROXINE SODIUM 112 MCG: 112 TABLET ORAL at 06:40

## 2020-02-14 RX ADMIN — LIDOCAINE HYDROCHLORIDE 5 ML: 10 INJECTION, SOLUTION EPIDURAL; INFILTRATION; INTRACAUDAL; PERINEURAL at 10:50

## 2020-02-14 RX ADMIN — LOSARTAN POTASSIUM 25 MG: 25 TABLET, FILM COATED ORAL at 16:47

## 2020-02-14 RX ADMIN — MELATONIN 3 MG: 3 TAB ORAL at 21:34

## 2020-02-14 RX ADMIN — ATORVASTATIN CALCIUM 40 MG: 40 TABLET, FILM COATED ORAL at 19:03

## 2020-02-14 RX ADMIN — LABETALOL 20 MG/4 ML (5 MG/ML) INTRAVENOUS SYRINGE 10 MG: at 11:09

## 2020-02-14 RX ADMIN — CHLORHEXIDINE GLUCONATE 0.12% ORAL RINSE 15 ML: 1.2 LIQUID ORAL at 21:34

## 2020-02-14 RX ADMIN — HYDRALAZINE HYDROCHLORIDE 5 MG: 20 INJECTION INTRAMUSCULAR; INTRAVENOUS at 14:56

## 2020-02-14 RX ADMIN — SENNOSIDES AND DOCUSATE SODIUM 1 TABLET: 8.6; 5 TABLET ORAL at 08:52

## 2020-02-14 RX ADMIN — AMLODIPINE BESYLATE 10 MG: 10 TABLET ORAL at 08:50

## 2020-02-14 RX ADMIN — LEVETIRACETAM 500 MG: 500 TABLET, FILM COATED ORAL at 21:34

## 2020-02-14 RX ADMIN — POTASSIUM CHLORIDE 40 MEQ: 1.5 SOLUTION ORAL at 10:57

## 2020-02-14 RX ADMIN — CHLORHEXIDINE GLUCONATE 0.12% ORAL RINSE 15 ML: 1.2 LIQUID ORAL at 10:51

## 2020-02-14 RX ADMIN — HEPARIN SODIUM 7500 UNITS: 5000 INJECTION INTRAVENOUS; SUBCUTANEOUS at 21:32

## 2020-02-14 RX ADMIN — SENNOSIDES AND DOCUSATE SODIUM 1 TABLET: 8.6; 5 TABLET ORAL at 19:03

## 2020-02-14 RX ADMIN — PANTOPRAZOLE SODIUM 40 MG: 40 TABLET, DELAYED RELEASE ORAL at 06:40

## 2020-02-14 RX ADMIN — POLYETHYLENE GLYCOL 3350 17 G: 17 POWDER, FOR SOLUTION ORAL at 13:40

## 2020-02-14 NOTE — PROGRESS NOTES
Progress Note - Elke Durham 1940, [de-identified] y o  female MRN: [id number]    Unit/Bed#: ICU 02 Encounter: [id number]    Primary Care Provider: Sangeetha Roach MD   Date and time admitted to hospital: 2/10/2020 10:18 PM      * Intraparenchymal hemorrhage of brain [facility])  Assessment & Plan  POD3 Right decompressive craniectomy and evacuation of intraparenchymal hematoma   · ICH 1 on initial presentation, ICH 2 prior to OR with repeat CT head (increase in volume)  · Patient with exam decline, leading to surgical decompression  · GCS 15 and RASCON with equal strength  Good DST  · Codman ICP monitor (0-5), VIRGINIE drain (130CC - less red in color c/w CSF), SDD to 0mmHg (10cc)    Imaging:  · CT head w/o, 2/11/20: acute intraparenchymal hemorrhage involving the right temporal lobe with surrounding vasogenic edema and mass effect on the right lateral ventricular system  The size of this hematoma measures approximately 7 5 x 4 0 x 2 3 cm, minimally increased in size from the prior exam   There is approximately 4 mm of right-to-left midline shift, stable from the prior exam   · CT head w/o, 2/12/20: Interval postoperative changes of right-sided decompressive craniectomy and evacuation of a large right temporal lobe intraparenchymal hematoma  Interval decrease in mass effect on the right lateral ventricle  No midline shift  Plan:  · Continue to monitor neurological exam  · Drain management  · VIRGINIE subgaleal: 130ml/24H - discontinue and suture tied  No complications  Site dry post pull  Lighter color c/w CSF  · SDD/cavity drain: 10ml/24H - discontinue and suture placed  No complications  Site dry post pull  · Monitor ICPs (0-4 today, 1-4 in room)  · ICP monitor discontinued at 1130  · Keppra 500mg BID x7 days  · -150  · PT/OT/SLP -   · cleared speech for regular diet  · PT/OT eval pending  Helmet in room  Cleared to wear helmet and mobilize     · Neurology following for stroke workup  · Pain control per primary team  · DVT ppx: SCDs, okay for HSQ starting 2/14/20 1930  · May transfer to floor if stable after 2-4 hours  D/w [facility] [place]  Neurosurgery will continue to follow at this time  Please don't hesitate to contact us with any additional questions  Scheduled postoperative follow-up  Subjective/Objective   Chief Complaint:  Postoperative follow-up    Subjective:  Patient states she is doing well  Supple overnight  No significant events  Patient denies any significant pain  Denies any speech or vision changes  Denies any weakness or sensory deficits  Denies any chest pain, shortness of breath, nausea or vomiting  Objective: Sitting up in bed  NAD  I/O       02/12 0701 - 02/13 0700 02/13 0701 - 02/14 0700 02/14 0701 - 02/15 0700    I V  (mL/kg) 1130 1 (10 5) 149 4 (1 4)     NG/ 120 70    IV Piggyback       Total Intake(mL/kg) 1285 1 (11 9) 269 4 (2 5) 70 (0 6)    Urine (mL/kg/hr) 1193 (0 5) 1200 (0 5) 135 (0 3)    Emesis/NG output 450 300     Drains 255 140 57    Blood       Total Output 1898 1640 192    Net -612 9 -1370 6 -122                 Invasive Devices     Peripheral Intravenous Line            Peripheral IV 02/12/20 Left Forearm 2 days    Peripheral IV 02/12/20 Right;Dorsal (posterior) Forearm 1 day    Peripheral IV 02/12/20 Right;Ventral (anterior) Forearm 1 day          Arterial Line            Arterial Line 02/11/20 Radial 3 days          Drain            Closed/Suction Drain Right Head Bulb 3 days    ICP Device ICP microsensor fiber Right Frontal region 3 days    NG/OG/Enteral Tube Orogastric Center mouth 3 days    Urethral Catheter Non-latex; Temperature probe 14 Fr  3 days    Ventriculostomy/Subdural Subdural drainage catheter Right Temporal region 3 days                Physical Exam:  Vitals: Blood pressure (!) 167/48, pulse 76, temperature 99 °F (37 2 °C), temperature source Bladder, resp   rate 19, height 5' 1" (1 549 m), weight 108 kg (237 lb 3 4 oz), SpO2 97 %, not currently breastfeeding  ,Body mass index is 44 82 kg/m²  Hemodynamic Monitoring: MAP: Arterial Line MAP (mmHg): 100 mmHg, ICP Mean: ICP Mean (mmHg): -1 mmHg    General appearance: alert, appears stated age, cooperative and no distress  Head:  Incision clean dry intact  Flap flat and soft  Eyes: EOMI, PERRL  Neck: supple, symmetrical, trachea midline   Lungs: non labored breathing  Heart: regular heart rate  Neurologic:   Mental status: Alert, oriented, thought content appropriate, GCS 15  Minimal difficulty with multistep commands  Cranial nerves: grossly intact (Cranial nerves II-XII) except mild decreased left nasolabial fold  Sensory: normal to light touch x4  Double simultaneous touch intact  Motor: moving all extremities without focal weakness  Coordination: finger to nose normal bilaterally, no drift bilaterally    Lab Results:  Results from last 7 days   Lab Units 02/14/20 0426 02/13/20  0537 02/12/20  0626  02/11/20  0806 02/10/20  2005   WBC Thousand/uL 20 41* 13 76* 15 18*  --  13 51* 11 26*   HEMOGLOBIN g/dL 11 1* 10 8* 10 0*  --  11 6 13 1   I STAT HEMOGLOBIN   --   --   --    < >  --   --    HEMATOCRIT % 34 8 34 8 30 2*  --  36 9 41 2   HEMATOCRIT, ISTAT   --   --   --    < >  --   --    PLATELETS Thousands/uL 209 165 140*  --  182 193   NEUTROS PCT %  --  91*  --   --  89* 88*   MONOS PCT %  --  4  --   --  5 4   MONO PCT % 4  --   --   --   --   --     < > = values in this interval not displayed       Results from last 7 days   Lab Units 02/14/20 0426 02/13/20  0537 02/12/20  0626  02/11/20  1011 02/11/20  0806 02/10/20  2005   POTASSIUM mmol/L 3 6 3 7 3 9   < >  --  3 6 4 9   CHLORIDE mmol/L 114* 111* 112*   < >  --  106 102   CO2 mmol/L 28 26 27   < >  --  31 26   CO2, I-STAT mmol/L  --   --   --   --  28  --   --    BUN mg/dL 37* 33* 25   < >  --  21 24   CREATININE mg/dL 1 06 1 18 1 11   < >  --  1 14 1 05   CALCIUM mg/dL 8 9 8 5 8 5   < >  --  9 1 9 3   ALK PHOS U/L  --   --   --   --   --  132* 153*   ALT U/L  --   --   --   --   --  19 26   AST U/L  --   --   --   --   --  15 41   GLUCOSE, ISTAT mg/dl  --   --   --   --  124  --   --     < > = values in this interval not displayed  Results from last 7 days   Lab Units 02/14/20  0426 02/12/20  0626 02/11/20  0604   MAGNESIUM mg/dL 2 5 2 1 2 0     Results from last 7 days   Lab Units 02/14/20  0426 02/12/20  0626 02/11/20  0604   PHOSPHORUS mg/dL 3 3 2 7 4 0     Results from last 7 days   Lab Units 02/10/20  2043   INR  1 13   PTT seconds 31     No results found for: TROPONINT  ABG:  Lab Results   Component Value Date    PHART 7 485 (H) 02/12/2020    AKU2KJX 33 8 (L) 02/12/2020    PO2ART 84 0 02/12/2020    MDP5HPG 24 9 02/12/2020    BEART 1 7 02/12/2020    SOURCE Line, Arterial 02/12/2020       Imaging Studies: I have personally reviewed pertinent reports  and I have personally reviewed pertinent films in PACS    Cta Head And Neck With And Without Contrast    Result Date: 2/10/2020  Impression: Reidentified large acute right temporal intraparenchymal hemorrhage with mass effect on the right lateral ventricular system and minimal to mild right to left midline shift  No focal stenosis or saccular aneurysm within the [place]  No hemodynamically significant stenosis within either common or internal carotid artery  Less than 50% stenosis by NASCET criteria  Partially visualized left pleural effusion  Workstation performed: [id number]     Xr Chest 1 View Portable    Result Date: 2/11/2020  Impression: Stable compared to prior  No acute cardiopulmonary disease  Workstation performed: [id number]     Ct Head Wo Contrast    Result Date: 2/12/2020  Impression: Interval postoperative changes of right-sided decompressive craniectomy and evacuation of a large right temporal lobe intraparenchymal hematoma  Interval decrease in mass effect on the right lateral ventricle  No midline shift  No hydrocephalus   Workstation performed: [id number]     Ct Head Wo Contrast    Result Date: 2/11/2020  Impression: Reidentified large acute intraparenchymal hemorrhage involving the right temporal lobe with mass effect on the right lateral ventricle and minimal to mild right to left midline shift  The size of this intraparenchymal hematoma has minimally increased from the prior exam  primarily in the AP dimension  Workstation performed: [id number]     Ct Head Without Contrast    Result Date: 2/10/2020  Impression: 1   6 3 x 3 5 x 2 5 cm acute intracerebral hemorrhage in the right temporal lobe with mild mass effect but no midline shift or herniation  2   Probable small chronic right frontal subdural collection, 2 mm in thickness  I personally discussed this study with Julio Mojica on 2/10/2020 at 8:30 PM  Workstation performed: [id number]       EKG, Pathology, and Other Studies: I have personally reviewed pertinent reports  VTE Pharmacologic Prophylaxis: Heparin - may start this evening 1930       VTE Mechanical Prophylaxis: sequential compression device

## 2020-02-14 NOTE — PLAN OF CARE
Problem: Potential for Falls  Goal: Patient will remain free of falls  Description  INTERVENTIONS:  - Assess patient frequently for physical needs  -  Identify cognitive and physical deficits and behaviors that affect risk of falls    -  [place] fall precautions as indicated by assessment   - Educate patient/family on patient safety including physical limitations  - Instruct patient to call for assistance with activity based on assessment  - Modify environment to reduce risk of injury  - Consider OT/PT consult to assist with strengthening/mobility  Outcome: Progressing     Problem: Prexisting or High Potential for Compromised Skin Integrity  Goal: Skin integrity is maintained or improved  Description  INTERVENTIONS:  - Identify patients at risk for skin breakdown  - Assess and monitor skin integrity  - Assess and monitor nutrition and hydration status  - Monitor labs   - Assess for incontinence   - Turn and reposition patient  - Assist with mobility/ambulation  - Relieve pressure over bony prominences  - Avoid friction and shearing  - Provide appropriate hygiene as needed including keeping skin clean and dry  - Evaluate need for skin moisturizer/barrier cream  - Collaborate with interdisciplinary team   - Patient/family teaching  - Consider wound care consult   Outcome: Progressing     Problem: NEUROSENSORY - ADULT  Goal: Achieves stable or improved neurological status  Description  INTERVENTIONS  - Monitor and report changes in neurological status  - Monitor vital signs such as temperature, blood pressure, glucose, and any other labs ordered   - Initiate measures to prevent increased intracranial pressure  - Monitor for seizure activity and implement precautions if appropriate      Outcome: Progressing  Goal: Remains free of injury related to seizures activity  Description  INTERVENTIONS  - Maintain airway, patient safety  and administer oxygen as ordered  - Monitor patient for seizure activity, document and report duration and description of seizure to physician/advanced practitioner  - If seizure occurs,  ensure patient safety during seizure  - Reorient patient post seizure  - Seizure pads on all 4 side rails  - Instruct patient/family to notify RN of any seizure activity including if an aura is experienced  - Instruct patient/family to call for assistance with activity based on nursing assessment  - Administer anti-seizure medications if ordered    Outcome: Progressing  Goal: Achieves maximal functionality and self care  Description  INTERVENTIONS  - Monitor swallowing and airway patency with patient fatigue and changes in neurological status  - Encourage and assist patient to increase activity and self care     - Encourage visually impaired, hearing impaired and aphasic patients to use assistive/communication devices  Outcome: Progressing     Problem: CARDIOVASCULAR - ADULT  Goal: Maintains optimal cardiac output and hemodynamic stability  Description  INTERVENTIONS:  - Monitor I/O, vital signs and rhythm  - Monitor for S/S and trends of decreased cardiac output  - Administer and titrate ordered vasoactive medications to optimize hemodynamic stability  - Assess quality of pulses, skin color and temperature  - Assess for signs of decreased coronary artery perfusion  - Instruct patient to report change in severity of symptoms  Outcome: Progressing  Goal: Absence of cardiac dysrhythmias or at baseline rhythm  Description  INTERVENTIONS:  - Continuous cardiac monitoring, vital signs, obtain 12 lead EKG if ordered  - Administer antiarrhythmic and heart rate control medications as ordered  - Monitor electrolytes and administer replacement therapy as ordered  Outcome: Progressing     Problem: RESPIRATORY - ADULT  Goal: Achieves optimal ventilation and oxygenation  Description  INTERVENTIONS:  - Assess for changes in respiratory status  - Assess for changes in mentation and behavior  - Position to facilitate oxygenation and minimize respiratory effort  - Oxygen administered by appropriate delivery if ordered  - Initiate smoking cessation education as indicated  - Encourage broncho-pulmonary hygiene including cough, deep breathe, Incentive Spirometry  - Assess the need for suctioning and aspirate as needed  - Assess and instruct to report SOB or any respiratory difficulty  - Respiratory Therapy support as indicated  Outcome: Progressing     Problem: GASTROINTESTINAL - ADULT  Goal: Minimal or absence of nausea and/or vomiting  Description  INTERVENTIONS:  - Administer IV fluids if ordered to ensure adequate hydration  - Maintain NPO status until nausea and vomiting are resolved  - Nasogastric tube if ordered  - Administer ordered antiemetic medications as needed  - Provide nonpharmacologic comfort measures as appropriate  - Advance diet as tolerated, if ordered  - Consider nutrition services referral to assist patient with adequate nutrition and appropriate food choices  Outcome: Progressing  Goal: Maintains or returns to baseline bowel function  Description  INTERVENTIONS:  - Assess bowel function  - Encourage oral fluids to ensure adequate hydration  - Administer IV fluids if ordered to ensure adequate hydration  - Administer ordered medications as needed  - Encourage mobilization and activity  - Consider nutritional services referral to assist patient with adequate nutrition and appropriate food choices  Outcome: Progressing  Goal: Maintains adequate nutritional intake  Description  INTERVENTIONS:  - Monitor percentage of each meal consumed  - Identify factors contributing to decreased intake, treat as appropriate  - Assist with meals as needed  - Monitor I&O, weight, and lab values if indicated  - Obtain nutrition services referral as needed  Outcome: Progressing     Problem: GENITOURINARY - ADULT  Goal: Maintains or returns to baseline urinary function  Description  INTERVENTIONS:  - Assess urinary function  - Encourage oral fluids to ensure adequate hydration if ordered  - Administer IV fluids as ordered to ensure adequate hydration  - Administer ordered medications as needed  - Offer frequent toileting  - Follow urinary retention protocol if ordered  Outcome: Progressing     Problem: METABOLIC, FLUID AND ELECTROLYTES - ADULT  Goal: Electrolytes maintained within normal limits  Description  INTERVENTIONS:  - Monitor labs and assess patient for signs and symptoms of electrolyte imbalances  - Administer electrolyte replacement as ordered  - Monitor response to electrolyte replacements, including repeat lab results as appropriate  - Instruct patient on fluid and nutrition as appropriate  Outcome: Progressing  Goal: Fluid balance maintained  Description  INTERVENTIONS:  - Monitor labs   - Monitor I/O and WT  - Instruct patient on fluid and nutrition as appropriate  - Assess for signs & symptoms of volume excess or deficit  Outcome: Progressing  Goal: Glucose maintained within target range  Description  INTERVENTIONS:  - Monitor Blood Glucose as ordered  - Assess for signs and symptoms of hyperglycemia and hypoglycemia  - Administer ordered medications to maintain glucose within target range  - Assess nutritional intake and initiate nutrition service referral as needed  Outcome: Progressing     Problem: SKIN/TISSUE INTEGRITY - ADULT  Goal: Skin integrity remains intact  Description  INTERVENTIONS  - Identify patients at risk for skin breakdown  - Assess and monitor skin integrity  - Assess and monitor nutrition and hydration status  - Monitor labs (i e  albumin)  - Assess for incontinence   - Turn and reposition patient  - Assist with mobility/ambulation  - Relieve pressure over bony prominences  - Avoid friction and shearing  - Provide appropriate hygiene as needed including keeping skin clean and dry  - Evaluate need for skin moisturizer/barrier cream  - Collaborate with interdisciplinary team (i e  Nutrition, Rehabilitation, etc )   - Patient/family teaching  Outcome: Progressing     Problem: HEMATOLOGIC - ADULT  Goal: Maintains hematologic stability  Description  INTERVENTIONS  - Assess for signs and symptoms of bleeding or hemorrhage  - Monitor labs  - Administer supportive blood products/factors as ordered and appropriate  Outcome: Progressing     Problem: MUSCULOSKELETAL - ADULT  Goal: Maintain or return mobility to safest level of function  Description  INTERVENTIONS:  - Assess patient's ability to carry out ADLs; assess patient's baseline for ADL function and identify physical deficits which impact ability to perform ADLs (bathing, care of mouth/teeth, toileting, grooming, dressing, etc )  - Assess/evaluate cause of self-care deficits   - Assess range of motion  - Assess patient's mobility  - Assess patient's need for assistive devices and provide as appropriate  - Encourage maximum independence but intervene and supervise when necessary  - Involve family in performance of ADLs  - Assess for home care needs following discharge   - Consider OT consult to assist with ADL evaluation and planning for discharge  - Provide patient education as appropriate  Outcome: Progressing     Problem: Neurological Deficit  Goal: Neurological status is stable or improving  Description  Interventions:  - Monitor and assess patient's level of consciousness, motor function, sensory function, and level of assistance needed for ADLs  - Monitor and report changes from baseline  Collaborate with interdisciplinary team to initiate plan and implement interventions as ordered  - Provide and maintain a safe environment  - Consider seizure precautions  - Consider fall precautions  - Consider aspiration precautions  - Consider bleeding precautions  Outcome: Progressing     Problem:  Activity Intolerance/Impaired Mobility  Goal: Mobility/activity is maintained at optimum level for patient  Description  Interventions:  - Assess and monitor patient  barriers to mobility and need for assistive/adaptive devices  - Assess patient's emotional response to limitations  - Collaborate with interdisciplinary team and initiate plans and interventions as ordered  - Encourage independent activity per ability   - Maintain proper body alignment  - Perform active/passive rom as tolerated/ordered  - Plan activities to conserve energy   - Turn patient as appropriate  Outcome: Progressing     Problem: Communication Impairment  Goal: Ability to express needs and understand communication  Description  Assess patient's communication skills and ability to understand information  Patient will demonstrate use of effective communication techniques, alternative methods of communication and understanding even if not able to speak  - Encourage communication and provide alternate methods of communication as needed  - Collaborate with case management/ for discharge needs  - Include patient/family/caregiver in decisions related to communication  Outcome: Progressing     Problem: Potential for Aspiration  Goal: Non-ventilated patient's risk of aspiration is minimized  Description  Assess and monitor vital signs, respiratory status, and labs (WBC)  Monitor for signs of aspiration (tachypnea, cough, rales, wheezing, cyanosis, fever)  - Assess and monitor patient's ability to swallow  - Place patient up in chair to eat if possible  - HOB up at 90 degrees to eat if unable to get patient up into chair   - Supervise patient during oral intake  - Instruct patient/ family to take small bites  - Instruct patient/ family to take small single sips when taking liquids  - Follow patient-specific strategies generated by speech pathologist   Outcome: Progressing  Goal: Ventilated patient's risk of aspiration is minimized  Description  Assess and monitor vital signs, respiratory status, airway cuff pressure, and labs (WBC)    Monitor for signs of aspiration (tachypnea, cough, rales, wheezing, cyanosis, fever)  - Elevate head of bed 30 degrees if patient has tube feeding   - Monitor tube feeding  Outcome: Progressing     Problem: Nutrition  Goal: Nutrition/Hydration status is improving  Description  Monitor and assess patient's nutrition/hydration status for malnutrition (ex- brittle hair, bruises, dry skin, pale skin and conjunctiva, muscle wasting, smooth red tongue, and disorientation)  Collaborate with interdisciplinary team and initiate plan and interventions as ordered  Monitor patient's weight and dietary intake as ordered or per policy  Utilize nutrition screening tool and intervene per policy  Determine patient's food preferences and provide high-protein, high-caloric foods as appropriate  - Assist patient with eating   - Allow adequate time for meals   - Encourage patient to take dietary supplement as ordered  - Collaborate with clinical nutritionist   - Include patient/family/caregiver in decisions related to nutrition  Outcome: Progressing     Problem: Nutrition/Hydration-ADULT  Goal: Nutrient/Hydration intake appropriate for improving, restoring or maintaining nutritional needs  Description  Monitor and assess patient's nutrition/hydration status for malnutrition  Collaborate with interdisciplinary team and initiate plan and interventions as ordered  Monitor patient's weight and dietary intake as ordered or per policy  Utilize nutrition screening tool and intervene as necessary  Determine patient's food preferences and provide high-protein, high-caloric foods as appropriate       INTERVENTIONS:  - Monitor oral intake, urinary output, labs, and treatment plans  - Assess nutrition and hydration status and recommend course of action  - Evaluate amount of meals eaten  - Assist patient with eating if necessary   - Allow adequate time for meals  - Recommend/ encourage appropriate diets, oral nutritional supplements, and vitamin/mineral supplements  - Order, calculate, and assess calorie counts as needed  - Recommend, monitor, and adjust tube feedings and TPN/PPN based on assessed needs  - Assess need for intravenous fluids  - Provide specific nutrition/hydration education as appropriate  - Include patient/family/caregiver in decisions related to nutrition  Outcome: Progressing     Problem: PAIN - ADULT  Goal: Verbalizes/displays adequate comfort level or baseline comfort level  Description  Interventions:  - Encourage patient to monitor pain and request assistance  - Assess pain using appropriate pain scale  - Administer analgesics based on type and severity of pain and evaluate response  - Implement non-pharmacological measures as appropriate and evaluate response  - Consider cultural and social influences on pain and pain management  - Notify physician/advanced practitioner if interventions unsuccessful or patient reports new pain  Outcome: Progressing     Problem: INFECTION - ADULT  Goal: Absence or prevention of progression during hospitalization  Description  INTERVENTIONS:  - Assess and monitor for signs and symptoms of infection  - Monitor lab/diagnostic results  - Monitor all insertion sites, i e  indwelling lines, tubes, and drains  - Monitor endotracheal if appropriate and nasal secretions for changes in amount and color  - [place] appropriate cooling/warming therapies per order  - Administer medications as ordered  - Instruct and encourage patient and family to use good hand hygiene technique  - Identify and instruct in appropriate isolation precautions for identified infection/condition  Outcome: Progressing     Problem: SAFETY ADULT  Goal: Maintain or return to baseline ADL function  Description  INTERVENTIONS:  -  Assess patient's ability to carry out ADLs; assess patient's baseline for ADL function and identify physical deficits which impact ability to perform ADLs (bathing, care of mouth/teeth, toileting, grooming, dressing, etc )  - Assess/evaluate cause of self-care deficits   - Assess range of motion  - Assess patient's mobility; develop plan if impaired  - Assess patient's need for assistive devices and provide as appropriate  - Encourage maximum independence but intervene and supervise when necessary  - Involve family in performance of ADLs  - Assess for home care needs following discharge   - Consider OT consult to assist with ADL evaluation and planning for discharge  - Provide patient education as appropriate  Outcome: Progressing  Goal: Maintain or return mobility status to optimal level  Description  INTERVENTIONS:  - Assess patient's baseline mobility status (ambulation, transfers, stairs, etc )    - Identify cognitive and physical deficits and behaviors that affect mobility  - Identify mobility aids required to assist with transfers and/or ambulation (gait belt, sit-to-stand, lift, walker, cane, etc )  - [place] fall precautions as indicated by assessment  - Record patient progress and toleration of activity level on Mobility SBAR; progress patient to next Phase/Stage  - Instruct patient to call for assistance with activity based on assessment  - Consider rehabilitation consult to assist with strengthening/weightbearing, etc   Outcome: Progressing     Problem: DISCHARGE PLANNING  Goal: Discharge to home or other facility with appropriate resources  Description  INTERVENTIONS:  - Identify barriers to discharge w/patient and caregiver  - Arrange for needed discharge resources and transportation as appropriate  - Identify discharge learning needs (meds, wound care, etc )  - Arrange for interpretive services to assist at discharge as needed  - Refer to Case Management Department for coordinating discharge planning if the patient needs post-hospital services based on physician/advanced practitioner order or complex needs related to functional status, cognitive ability, or social support system  Outcome: Progressing     Problem: Knowledge Deficit  Goal: Patient/family/caregiver demonstrates understanding of disease process, treatment plan, medications, and discharge instructions  Description  Complete learning assessment and assess knowledge base    Interventions:  - Provide teaching at level of understanding  - Provide teaching via preferred learning methods  Outcome: Progressing

## 2020-02-14 NOTE — OCCUPATIONAL THERAPY NOTE
Occupational Therapy Evaluation     Patient Name: Tatiana Connors  GXFCT'O Date: 2020  Problem List  Principal Problem:    Intraparenchymal hemorrhage of brain [facility])  Active Problems:    Benign essential hypertension    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) ([facility])    Hypothyroidism    CKD (chronic kidney disease), stage III ([facility] )    Hyperlipemia    Morbid obesity with body mass index of 40 0-44 9 in adult ([facility])    WENDI (obstructive sleep apnea)    Gastroesophageal reflux disease without esophagitis    Past Medical History  Past Medical History:   Diagnosis Date    Anxiety     Arthritis     joints    Cataract     Disease of thyroid gland     Eczema     GERD (gastroesophageal reflux disease)     Gout     Heart failure ([facility] )     Hepatitis     Hiatal hernia     Hypertension     Onychomycosis     last assessed: 16    Orthopnea     resolved: 16    Sleep apnea     wears CPAP     Past Surgical History  Past Surgical History:   Procedure Laterality Date    ABDOMINAL SURGERY          BRAIN HEMATOMA EVACUATION Right 2020    Procedure: Right decompressive craniectomy and evacuation of intraparenchymal hematoma; Surgeon: Dwayne Stephenson MD;  Location:  MAIN OR;  Service: Neurosurgery    BREAST SURGERY Right     cyst removal    CARPAL TUNNEL RELEASE Left     CARPAL TUNNEL RELEASE Right     CATARACT EXTRACTION       SECTION  1960    x1    COLONOSCOPY N/A 2018    Procedure: COLONOSCOPY;  Surgeon: Jahaira Bain MD;  Location: [place] [address] GI LAB; Service: Gastroenterology    DILATION AND CURETTAGE OF UTERUS  1967    EYE SURGERY Left 2006    cataracts    HERNIA REPAIR      umbilical hernia    INCISIONAL HERNIA REPAIR      incarcerated    KNEE ARTHROSCOPY Right     HI XCAPSL CTRC RMVL INSJ IO LENS PROSTH W/O ECP Right 3/27/2017    Procedure: EXTRACTION EXTRACAPSULAR CATARACT PHACO INTRAOCULAR LENS (IOL);   Surgeon: Liss Gardner MD;  Location: [facility] MAIN OR;  Service: Ophthalmology             02/14/20 1425   Note Type   Note type Eval/Treat   Restrictions/Precautions   Weight Bearing Precautions Per Order No   Braces or Orthoses Craniectomy Helmet   Other Precautions Cognitive; Chair Alarm; Bed Alarm;Multiple lines;Telemetry; Fall Risk;Pain;O2  (craniectomy helmet)   Pain Assessment   Pain Assessment No/denies pain   Pain Score No Pain   Home Living   Type of [address] One level; Other (Comment); Stairs to enter with rails; Performs ADLs on one level; Able to live on main level with bedroom/bathroom  (2 KUMAR; laundry 1st floor)   Bathroom Shower/Tub Walk-in shower   Bathroom Toilet Standard   Bathroom Equipment Grab bars in shower;Grab bars around toilet; Shower chair   Home Equipment Walker   Additional Comments Pt reports living w/ her son, dght in law, and grandson in 3 SH, 2 KUMAR w/ hand rail, 1st floor setup   Prior Function   Level of Walker Independent with ADLs and functional mobility   Lives With [employer] Help From Family   ADL Assistance Independent   IADLs Independent   Falls in the last 6 months 0   Vocational Retired   Comments Pt reports being I w/ ADLS, IADLS, Mod I w/ transfers and functional mobility PTA   Lifestyle   Autonomy I ADLS, IADLS, Mod I w/ transfers and functional mobility PTA   Reciprocal Relationships Pt lives w/ family; someone is home 24/7   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(?))   Intrinsic Gratification Pt enjoys cooking and staying active   Anshul Abrams 19 (WDL) [address]  5  [facility] 5  Supervision/Setup   [address] 4  Minimal Assistance   LB [address] 3  Moderate Assistance   [address] 4  [address] 3  Moderate Assistance   [address]  3  Moderate [address] 3  Moderate Assistance   Functional Deficit Steadying;Verbal cueing;Supervision/safety; Increased time to complete   Bed Mobility   Supine to Sit 4  Minimal assistance   Additional items Assist x 1;HOB elevated; Increased time required;Verbal cues;LE management   Sit to Supine Unable to assess   Additional Comments Pt went from supine to sit w/ Min A x1 for UB support and LE management, HOB elevated for assist  Pt sat EOB w/ G balance/trunk control   Transfers   Sit to Stand 3  Moderate assistance   Additional items Assist x 1; Increased time required;Verbal cues   Stand to Sit 3  Moderate assistance   Additional items Assist x 1; Increased time required;Verbal cues   Additional Comments Pt performed sit-stand from EOB w/ Mod A x1 for moderate force production into standing, +VC for safety/proper technique  Functional Mobility   Functional Mobility 3  Moderate assistance   Additional Comments Pt took few small steps from EOB to chair w/ Mod  Ax1, HHA used, +VC for safety/proper technique   Additional items Hand hold assistance   Balance   Static Sitting Fair +   Dynamic Sitting Fair -   Static Standing Poor +   Dynamic Standing Poor   Ambulatory Poor   Activity Tolerance   Activity Tolerance Patient tolerated treatment well   Medical Staff Made Aware PT   Nurse Made Aware yes, [place] Assessment   RUE Assessment WFL   LUE Assessment   LUE Assessment WFL   Hand Function   Gross Motor Coordination Functional   Fine Motor Coordination Functional   Sensation   Light Touch No apparent deficits   Vision-Basic Assessment   Current Vision No visual deficits   Cognition   Overall Cognitive Status WFL   Arousal/Participation Responsive; Cooperative   Attention Within functional limits   Orientation Level Oriented X4   Memory Within functional limits   Following Commands Follows all commands and directions without difficulty   Comments Pt is pleasant and cooperative; needs occasional safety cue   Assessment   Limitation Decreased ADL status; Decreased Safe judgement during ADL;Decreased UE strength;Decreased cognition;Decreased endurance;Decreased self-care trans;Decreased high-level ADLs   Prognosis Fair   Assessment Pt is a [de-identified] y/o female seen for OT eval s/p adm [facility]  Pt is dx'd w/ acute intraparenchymal hemorrhage of R temporal lobe  Pt is now s/p the following procedure "Right decompressive craniectomy and evacuation of intraparenchymal hematoma (Right)" performed on 2/11/2020  Pt has craniectomy precautions and helmet for when OOB  Pt  has a past medical history of Anxiety, Arthritis, Cataract, Disease of thyroid gland, Eczema, GERD (gastroesophageal reflux disease), Gout, Heart failure ([facility] ), Hepatitis, Hiatal hernia, Hypertension, Onychomycosis, Orthopnea, and Sleep apnea  Pt with active OT orders and up and OOB as tolerated orders  Pt lives with family in 1 SH, 2 KUMAR w/ HR, 1st floor setup  Pt was I w/  ADLS and IADLS, drove short distances, & required use of DME PTA including RW, s/c, grab bars in the shower  Pt is currently demonstrating the following occupational deficits: Min A UB ADLS, Mod A LB ADLS, mIn A bed mobility, Mod A transfers and functional mobility w/ HHA  These deficits that are impacting pt's baseline areas of occupation are a result of the following impairments: pain, endurance, activity tolerance, functional mobility, forward functional reach, balance, functional standing tolerance, decreased I w/ ADLS/IADLS, ROM and decreased safety awareness  The following Occupational Performance Areas to address include: eating, grooming, bathing/shower, toilet hygiene, dressing, socialization, health maintenance, functional mobility, clothing management and household maintenance  Pt scored overall 40/100 on the Barthel Index  Based on the aforementioned OT evaluation, functional performance deficits, and assessments, pt has been identified as a high complexity evaluation   Recommend STR upon D/C, when medically stable Pt to continue to benefit from acute immediate OT services to address the following goals 3-5x/week to  w/in 10-14 days:    Goals   Patient Goals to go home and cook for family again   LTG Time Frame 10-14   Long Term Goal #1 see below listed goals   Plan   Treatment Interventions ADL retraining;Functional transfer training;UE strengthening/ROM; Endurance training;Cognitive reorientation;Patient/family training; Compensatory technique education;Continued evaluation; Energy conservation; Activityengagement;Equipment evaluation/education   Goal Expiration Date 20   OT Frequency 3-5x/wk   Recommendation   OT Discharge Recommendation Short Term Rehab   OT - OK to Discharge Yes  (when medically stable)   Barthel Index   Feeding 10   Bathing 0   Grooming Score 5   Dressing Score 5   Bladder Score 0   Bowels Score 10   Toilet Use Score 5   Transfers (Bed/Chair) Score 5   Mobility (Level Surface) Score 0   Stairs Score 0   Barthel Index Score 40      GOALS    1) Pt will improve activity tolerance to G for min 30 min txment sessions for increase engagement in functional tasks  2) Pt will complete UB/LB dressing/self care w/ S using adaptive device and DME as needed  3) Pt will complete bathing w/ S w/ use of AE and DME as needed  4) Pt will complete toileting w/ S w/ G hygiene/thoroughness using DME as needed  5) Pt will improve functional transfers to S on/off all surfaces using DME as needed w/ G balance/safety   6) Pt will improve functional mobility during ADL/IADL/leisure tasks to S using DME as needed w/ G balance/safety   7) Pt will be attentive 100% of the time during ongoing cognitive assessment w/ G participation to assist w/ safe d/c planning/recommendations  8) Pt will demonstrate G carryover of pt/caregiver education and training as appropriate w/o cues w/ good tolerance to increase safety during functional tasks  9) Pt will demonstrate 100% carryover of energy conservation techniques t/o functional I/ADL/leisure tasks w/o cues s/p skilled education to increase endurance during functional tasks       Radha Michelle MS, OTR/L

## 2020-02-14 NOTE — ORTHOTIC NOTE
Orthotic Note            Date: 2/14/2020      Patient Name: Neida Hernandez            Reason for Consult:  Patient Active Problem List   Diagnosis    Morbid obesity due to excess calories ([facility] )    Benign essential hypertension    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)    Hypothyroidism    Acute on chronic diastolic congestive heart failure (HCC)    Arthropathy of knee    Hallux abductovalgus with bunions, unspecified laterality    Atherosclerosis of arteries of extremities (HCC)    Chronic low back pain    Chronic venous insufficiency    CKD (chronic kidney disease), stage III (HCC)    Difficulty in walking    Diverticulitis of colon    Elevated triglycerides with high cholesterol    Chronic gout without tophus    Hiatal hernia    Hyperlipemia    Hyperuricemia    Knee pain    Lumbar disc herniation with radiculopathy    Morbid obesity with body mass index of 40 0-44 9 in adult ([facility] )    Nephrolithiasis    Onychomycosis    WENDI (obstructive sleep apnea)    Umbilical hernia    Tarsal tunnel syndrome    Rupture of popliteal cyst    Pseudogout of left wrist    Plantar fascial fibromatosis    Anxiety    Alkaline phosphatase elevation    Pain in both feet    Gastroesophageal reflux disease without esophagitis    Iron deficiency anemia secondary to inadequate dietary iron intake    Radiculopathy of lumbar region    Corns    SOB (shortness of breath)    Eczema    Osteoporosis screening    Peripheral arteriosclerosis ([facility] )    Intraparenchymal hemorrhage of brain ([facility] )   Danmar Protective Helmet     [facility] to fit     [facility] concerning fit of Danmar protective helmet  Removed excess padding around brim of helmet for more optimal fit  RN present during time of adjustments and PT/OT made aware to mobilize patient  Will continue to follow up daily           Recommendations:  Please call [facility] ext [phone] in regards to any bracing instructions and/or adjustments       [address] [facility] Restorative Technician, BS

## 2020-02-14 NOTE — DISCHARGE INSTRUCTIONS
Discharge Instructions  Craniectomy    Activity:  1  Do not lift, pushing or pulling more than 10 pounds for 2 weeks  2  Avoid bending, lifting and twisting for 2 weeks  3  Walk as tolerated  Recommend at least four short walks daily  HELMET ON WHEN OUT OF BED  4  No driving until cleared by Neurosurgery  5  Do not use a hair dryer, and avoid hair products such as mousse, oils, and gels  Do not brush your hair away from the incision since this will put strain on the suture line  6  Do not dye or perm hair for 6 weeks or until cleared by physician  7  Continue to change bed linens and pajamas more frequently  Wear clean clothes daily  Surgical incision care:  1  Keep dressings in place for 3 days  2  After 3 days, incisions may be left open to air, but should remain clean  3  Keep incisions dry for 3 days  4  May shower after 3 days using a baby shampoo including head incision  Rinse off shampoo and pat dry  Avoid, rubbing incision but gently massage hair  a  Continue to use clean towel and washcloth with each shower for 2 weeks post-op  5  Do not immerse the incisions in water for 6 weeks or until cleared  6  Do not apply any creams or ointments to the incision, unless otherwise instructed by [facility]  7  Contact office if increasing redness, drainage, pain or swelling occurs around the incisions or if you develop a fever greater than 101F  8  Do not dye/perm hair or use any hair products until cleared by Neurosurgery  Postoperative medication:  1  [facility] will provide pain medication in the postoperative period  All prescriptions must come from a single provider  a  Take medications as prescribed  Call office with any questions/concerns  b  May use over the counter Tylenol  No NSAIDs (ie  Ibuprofen, Aleve, Advil, Naproxen, etc )  2  Please contact office for questions regarding dosage and modifications    3  No antiplatelet or anticoagulation medication until cleared by [address], unless otherwise instructed  Please contact [facility] if you have any questions about the effects of any of your medications on blood clotting  4  Do not operate heavy machinery or vehicles while taking sedating medications  5  Use a bowel regimen while on opioids as they induce constipation  Ie  Senokot-S, Miralax, Colace, etc  Increase fiber and water intake  ** Please notify the office if incision becomes red, swollen, tender, or has increased drainage, and temp>101  Return to the ER if you experience increased headache, difficulty walking, change in vision or speech, drowsiness, weakness, nausea/vomiting, or seizures  **

## 2020-02-14 NOTE — PLAN OF CARE
Problem: PHYSICAL THERAPY ADULT  Goal: Performs mobility at highest level of function for planned discharge setting  See evaluation for individualized goals  Note:   Prognosis: Good  Problem List: Decreased strength, Decreased endurance, Impaired balance, Decreased mobility  Assessment: Pt is a [de-identified] yo female admitted to [place] [address] on 2/11/2020 s/p hypertension headache and nausea  Pt had surgery on 2/11/2020 R decompressive craniectomy and evacuation of intraparenchymal hematoma  Dx: intraparenchymal hemorrhage of brain  Pt extubated on 2/13/2020  Pt needs to wear helmet with mobility  Two patient identifiers were used to confirm  Pt lives with her son, LUDWIG and her grandson  Pt was I for ADL's and mobility prior with the use of a rolator  Pt drives short distances  Pt lives in a [facility] with 2 KUMAR  Pt's impairments include reduced mobility, limited endurance, high risk of falling  These impairments limit the ability of the patient to perform mobility without increased assistance, return to PLOF and participate in everyday life activities  Pt would benefit from continued skilled therapy while in the hospital to improve overall mobility and work towards a safe d/c  Recommend discharge to rehab  At the end of the session the patient was left in seated position with call bell and phone within reach  Barriers to Discharge: Inaccessible home environment     Recommendation: Post acute IP rehab     PT - OK to Discharge: No    See flowsheet documentation for full assessment

## 2020-02-14 NOTE — PLAN OF CARE
Problem: OCCUPATIONAL THERAPY ADULT  Goal: Performs self-care activities at highest level of function for planned discharge setting  See evaluation for individualized goals  Description  Treatment Interventions: ADL retraining, Functional transfer training, UE strengthening/ROM, Endurance training, Cognitive reorientation, Patient/family training, Compensatory technique education, Continued evaluation, Energy conservation, Activityengagement, Equipment evaluation/education          See flowsheet documentation for full assessment, interventions and recommendations  Note:   Limitation: Decreased ADL status, Decreased Safe judgement during ADL, Decreased UE strength, Decreased cognition, Decreased endurance, Decreased self-care trans, Decreased high-level ADLs  Prognosis: Fair  Assessment: Pt is a [de-identified] y/o female seen for OT eval s/p adm [email or web address] [place] a [place]  Pt is dx'd w/ acute intraparenchymal hemorrhage of R temporal lobe  Pt is now s/p the following procedure "Right decompressive craniectomy and evacuation of intraparenchymal hematoma (Right)" performed on 2/11/2020  Pt has craniectomy precautions and helmet for when OOB  Pt  has a past medical history of Anxiety, Arthritis, Cataract, Disease of thyroid gland, Eczema, GERD (gastroesophageal reflux disease), Gout, Heart failure ([facility] ), Hepatitis, Hiatal hernia, Hypertension, Onychomycosis, Orthopnea, and Sleep apnea  Pt with active OT orders and up and OOB as tolerated orders  Pt lives with family in 1 SH, 2 KUMAR w/ HR, 1st floor setup  Pt was I w/  ADLS and IADLS, drove short distances, & required use of DME PTA including RW, s/c, grab bars in the shower  Pt is currently demonstrating the following occupational deficits: Min A UB ADLS, Mod A LB ADLS, mIn A bed mobility, Mod A transfers and functional mobility w/ HHA   These deficits that are impacting pt's baseline areas of occupation are a result of the following impairments: pain, endurance, activity tolerance, functional mobility, forward functional reach, balance, functional standing tolerance, decreased I w/ ADLS/IADLS, ROM and decreased safety awareness  The following Occupational Performance Areas to address include: eating, grooming, bathing/shower, toilet hygiene, dressing, socialization, health maintenance, functional mobility, clothing management and household maintenance  Pt scored overall 40/100 on the Barthel Index  Based on the aforementioned OT evaluation, functional performance deficits, and assessments, pt has been identified as a high complexity evaluation   Recommend STR upon D/C, when medically stable Pt to continue to benefit from acute immediate OT services to address the following goals 3-5x/week to  w/in 10-14 days:      OT Discharge Recommendation: Short Term Rehab  OT - OK to Discharge: Yes(when medically stable)     Chichi Magaña MS, OTR/L

## 2020-02-14 NOTE — PROGRESS NOTES
Daily Progress Note - Critical Care   Christi Wise [de-identified] y o  female MRN: [id number]  Unit/Bed#: ICU 02 Encounter: [id number]        ----------------------------------------------------------------------------------------  HPI/24hr events:   Patient successfully extubated yesterday 2/13/2020  Patient is postop day 3 from emergent decompressive craniectomy and evacuation of intraparenchymal hematoma  Patient continues to have Cloutex intracranial ICP monitor no elevated ICPs overnight  No agitation overnight    ---------------------------------------------------------------------------------------  SUBJECTIVE  Patient expresses dry mouth and wants to try to eat or at least get ice chips  She denies headache currently or any blurry vision  She had some chest discomfort yesterday after extubation but reports improvement  She denies any nausea, vomiting, abdominal pain  Review of Systems   Constitutional: Negative for activity change and unexpected weight change  HENT: Positive for sore throat  Negative for congestion  Eyes: Negative for visual disturbance  Respiratory: Negative for apnea, shortness of breath and wheezing  Cardiovascular: Positive for chest pain  Negative for palpitations and leg swelling  Gastrointestinal: Negative for abdominal distention, diarrhea, nausea and vomiting  Genitourinary: Aldo in    Musculoskeletal: Negative for arthralgias and back pain  Skin: Negative for color change and pallor  Neurological: Positive for headaches  Negative for dizziness and weakness  Psychiatric/Behavioral: Negative for agitation, behavioral problems and sleep disturbance       Review of systems was reviewed and negative unless stated above in HPI/24-hour events   ---------------------------------------------------------------------------------------  Assessment and Plan:  Neuro:   · Diagnosis:  ICH  · CTH:  Acute ICH in the right temporal lobe with mild mass effect but no significant midline shift or herniation, small chronic right frontal subdural collection 2 mm  · CTA:  Large acute right temporal intraparenchymal hemorrhage with mass effect on the right lateral ventricle, minimal to mild right-to-left midline shift, no focal stenosis or saccular aneurysm within the [place], no hemodynamically significant stenosis in either ICA, less than 50% stenosis by NASCET criteria  · Atorvastatin 40 mg QD   · Keppra 500 mg q12h day 3/7  · Q1h neuro checks  · Goal SBP < 140  · Neurology and neurosurgery following, appreciate recommendations  · Unable to get MRI secondary to Codman in place  · Pain  · Will start p r n  Tylenol and oxycodone 2 5 for pain  · Discontinue fentanyl  · Continue regulate sleep-wake cycle:  Minimize daytime naps  Give melatonin bedtime    CV:  · Diagnosis:  Reported History of combined diastolic systolic heart failure  · Echo 2/12 left ventricular ejection fraction 65%, increased wall thickness, moderate to severe MR, elevated PA pressure  · Continue Bumex 2 mg daily  Will hold off on b i d  Dosing has patient's home schedule  · Net negative 1 3 L 24 hours  · Continue to monitor intake output  · Hypertension   · On home losartan 25 mg twice a day  · Currently on amlodipine 5 mg daily and Bumex 2 mg daily  · Received IV labetalol and hydralazine overnight  · Goal SBP less than 140  · Will increase amlodipine 10 mg daily and continue Bumex  · Continue p r n  · Hyperlipidemia  · Continue atorvastatin 40 mg daily      Pulm:  · Diagnosis:  WENDI on BiPaP  · Place nasal cannula at bedtime  Avoid BiPAP as patient has right craniectomy    GI:   · Diagnosis:   GERD   · Plan: continue home Protonix 40     :   · Diagnosis: History chronic kidney disease  · BUN 37, creatinine 1 06  · Patient with no IV fluids or p o  Intake at this time    · Continue Bumex 2 mg daily if feeding starts  · Monitor BMP  · Discontinue Carlson catheter today    F/E/N:   · Fluids:  None at this time  Consider free water through NG tube  · Electrolyte:  Sodium 148, potassium 3 6, magnesium 2 5, phosphorus 3 3  Will replete potassium and phosphorus  · Nutrition:  Plan for oral feeds once cleared by speech or Will start tube feeds    Heme/Onc  · Diagnosis: Leukocytosis   · Leukocytosis increased from 13 to 20  · Remains afebrile  · Likely secondary to Decadron  · Will monitor for now with daily CBC and fever trend  · Will start DVT prophylaxis with heparin per Neurosurgery today    Endo:   · Diagnosis:   history of hypothyroidism  · Continue home levothyroxine 112 mcg    ID:   · Diagnosis:   no acute issues, leukocytosis with no fever and not on Tylenol    · Follow-up CBC  · Monitor temps     MSK/Skin:   · Frequent repositioning  · PT/OT  · Consider PMR consult when appropriate      Disposition: Continue Critical Care   Code Status: Level 3 - DNAR and DNI  ---------------------------------------------------------------------------------------  ICU CORE MEASURES    Prophylaxis   VTE Pharmacologic Prophylaxis: will start Sub Q heparin  VTE Mechanical Prophylaxis: sequential compression device  Stress Ulcer Prophylaxis: Pantoprazole PO    ABCDE Protocol (if indicated)  Plan to perform spontaneous awakening trial today? Not applicable  Plan to perform spontaneous breathing trial today? Not applicable  Obvious barriers to extubation?  Not applicable  CAM-ICU: Negative    Invasive Devices Review  Invasive Devices     Peripheral Intravenous Line            Peripheral IV 02/12/20 Left Forearm 1 day    Peripheral IV 02/12/20 Right;Dorsal (posterior) Forearm 1 day    Peripheral IV 02/12/20 Right;Ventral (anterior) Forearm 1 day          Arterial Line            Arterial Line 02/11/20 Radial 2 days          Drain            Closed/Suction Drain Right Head Bulb 2 days    ICP Device ICP microsensor fiber Right Frontal region 2 days    NG/OG/Enteral Tube Orogastric Center mouth 2 days    Urethral Catheter Non-latex; Temperature probe 14 Fr  2 days    Ventriculostomy/Subdural Subdural drainage catheter Right Temporal region 2 days              Can any invasive devices be discontinued today? Yes will remove ludwig and A line and NG if passed by speech  ---------------------------------------------------------------------------------------  OBJECTIVE    Vitals   Vitals:    20 2100 20 2200 20 2300 20 0000   BP:       Pulse: 78 78 74 76   Resp: 15 15 17 16   Temp: 98 6 °F (37 °C) 98 2 °F (36 8 °C) 98 2 °F (36 8 °C) 98 2 °F (36 8 °C)   TempSrc: Probe Probe     SpO2: 97% 97% 95% 96%   Weight:       Height:         Temp (24hrs), Av 4 °F (36 3 °C), Min:96 4 °F (35 8 °C), Max:98 6 °F (37 °C)  Current: Temperature: 98 2 °F (36 8 °C)    Physical Exam   Constitutional: She is oriented to person, place, and time  She appears well-developed and well-nourished  HENT:   Mouth/Throat: No oropharyngeal exudate  Right craniectomy covered in dressing  Codman and right subdural drain in place  NG tube in place   Eyes: Pupils are equal, round, and reactive to light  Conjunctivae are normal  No scleral icterus  Neck: Normal range of motion  Cardiovascular:   Murmur heard  Pulmonary/Chest: Effort normal and breath sounds normal  No stridor  No respiratory distress  Abdominal: Soft  Bowel sounds are normal  There is no tenderness  Musculoskeletal: She exhibits no edema or tenderness  Lymphadenopathy:     She has no cervical adenopathy  Neurological: She is alert and oriented to person, place, and time  No cranial nerve deficit or sensory deficit  She exhibits normal muscle tone  Skin: Skin is warm and dry  She is not diaphoretic  No erythema  Psychiatric: She has a normal mood and affect   Her behavior is normal          Respiratory:O2 Flow Rate (L/min): 3 L/min    Invasive/non-invasive ventilation settings   Respiratory    Lab Data (Last 4 hours)    None         O2/Vent Data (Last 4 hours) None                Laboratory and Diagnostics:  Results from last 7 days   Lab Units 02/14/20  0426 02/13/20  0537 02/12/20  0626 02/11/20  1011 02/11/20  0806 02/10/20  2005   WBC Thousand/uL 20 41* 13 76* 15 18*  --  13 51* 11 26*   HEMOGLOBIN g/dL 11 1* 10 8* 10 0*  --  11 6 13 1   I STAT HEMOGLOBIN g/dl  --   --   --  9 9*  --   --    HEMATOCRIT % 34 8 34 8 30 2*  --  36 9 41 2   HEMATOCRIT, ISTAT %  --   --   --  29*  --   --    PLATELETS Thousands/uL 209 165 140*  --  182 193   NEUTROS PCT %  --  91*  --   --  89* 88*   MONOS PCT %  --  4  --   --  5 4   MONO PCT % 4  --   --   --   --   --      Results from last 7 days   Lab Units 02/14/20 0426 02/13/20  0537 02/12/20  0626 02/11/20 2358 02/11/20  1011 02/11/20  0806 02/10/20  2005   SODIUM mmol/L 148* 145 145 145  --  141 138   POTASSIUM mmol/L 3 6 3 7 3 9 3 2*  --  3 6 4 9   CHLORIDE mmol/L 114* 111* 112* 109*  --  106 102   CO2 mmol/L 28 26 27 28  --  31 26   CO2, I-STAT mmol/L  --   --   --   --  28  --   --    ANION GAP mmol/L 6 8 6 8  --  4 10   BUN mg/dL 37* 33* 25 22  --  21 24   CREATININE mg/dL 1 06 1 18 1 11 1 15  --  1 14 1 05   CALCIUM mg/dL 8 9 8 5 8 5 8 5  --  9 1 9 3   GLUCOSE RANDOM mg/dL 130 159* 151* 150*  --  128 118   ALT U/L  --   --   --   --   --  19 26   AST U/L  --   --   --   --   --  15 41   ALK PHOS U/L  --   --   --   --   --  132* 153*   ALBUMIN g/dL  --   --   --   --   --  3 1* 3 2*   TOTAL BILIRUBIN mg/dL  --   --   --   --   --  0 49 0 68     Results from last 7 days   Lab Units 02/14/20  0426 02/12/20  0626 02/11/20  0604   MAGNESIUM mg/dL 2 5 2 1 2 0   PHOSPHORUS mg/dL 3 3 2 7 4 0      Results from last 7 days   Lab Units 02/10/20  2043   INR  1 13   PTT seconds 31      Results from last 7 days   Lab Units 02/10/20  2005   TROPONIN I ng/mL 0 05*         ABG:  Results from last 7 days   Lab Units 02/12/20  0626   PH ART  7 485*   PCO2 ART mm Hg 33 8*   PO2 ART mm Hg 84 0   HCO3 ART mmol/L 24 9   BASE EXC ART mmol/L 1 7 ABG SOURCE  Line, Arterial     VBG:  Results from last 7 days   Lab Units 02/12/20  0626   ABG SOURCE  Line, Arterial           Imaging: Cta Head And Neck With And Without Contrast    Result Date: 2/10/2020  Impression: Reidentified large acute right temporal intraparenchymal hemorrhage with mass effect on the right lateral ventricular system and minimal to mild right to left midline shift  No focal stenosis or saccular aneurysm within the [place]  No hemodynamically significant stenosis within either common or internal carotid artery  Less than 50% stenosis by NASCET criteria  Partially visualized left pleural effusion  Workstation performed: [id number]     Xr Chest 1 View Portable    Result Date: 2/11/2020  Impression: Stable compared to prior  No acute cardiopulmonary disease  Workstation performed: [id number]     Ct Head Wo Contrast    Result Date: 2/12/2020  Impression: Interval postoperative changes of right-sided decompressive craniectomy and evacuation of a large right temporal lobe intraparenchymal hematoma  Interval decrease in mass effect on the right lateral ventricle  No midline shift  No hydrocephalus  Workstation performed: [id number]     Ct Head Wo Contrast    Result Date: 2/11/2020  Impression: Reidentified large acute intraparenchymal hemorrhage involving the right temporal lobe with mass effect on the right lateral ventricle and minimal to mild right to left midline shift  The size of this intraparenchymal hematoma has minimally increased from the prior exam  primarily in the AP dimension  Workstation performed: [id number]     Ct Head Without Contrast    Result Date: 2/10/2020  Impression: 1   6 3 x 3 5 x 2 5 cm acute intracerebral hemorrhage in the right temporal lobe with mild mass effect but no midline shift or herniation  2   Probable small chronic right frontal subdural collection, 2 mm in thickness    I personally discussed this study with Moi Garcia on 2/10/2020 at 8:30 PM  Workstation performed: [id number]    I have personally reviewed pertinent reports  and I have personally reviewed pertinent films in PACS    Intake and Output  I/O       02/12 0701 - 02/13 0700 02/13 0701 - 02/14 0700    I V  (mL/kg) 1130 1 (10 5) 149 4 (1 4)    NG/ 120    Total Intake(mL/kg) 1285 1 (11 9) 269 4 (2 5)    Urine (mL/kg/hr) 1193 (0 5) 1200 (0 5)    Emesis/NG output 450 300    Drains 255 140    Total Output 1898 1640    Net -612 9 -1370 6              Height and Weights   Height: 5' 1" (154 9 cm)  IBW: 47 8 kg  Body mass index is 44 82 kg/m²  Weight (last 2 days)     Date/Time   Weight    02/13/20 0600   108 (237 22)                Nutrition       Diet Orders   (From admission, onward)             Start     Ordered    02/11/20 1224  Diet NPO  Diet effective now     Question Answer Comment   Diet Type NPO    RD to adjust diet per protocol?  Yes        02/11/20 1223                  Active Medications  Scheduled Meds:  Current Facility-Administered Medications:  amLODIPine 5 mg Oral Daily Darline Tinoco MD   atorvastatin 40 mg Oral QPM Darline Tinoco MD   bisacodyl 10 mg Rectal Daily PRN Odalys Castellanos MD   bumetanide 2 mg Oral Daily Mamie Mcdaniel PA-C   chlorhexidine 15 mL Swish & Spit Q12H [facility], MD   fentanyl citrate (PF) 50 mcg Intravenous Q1H PRN Jim Laughlin MD   hydrALAZINE 5 mg Intravenous Q6H PRN Darline Tinoco MD   Labetalol HCl 10 mg Intravenous Q4H PRN Jim Laughlin MD   levETIRAcetam 500 mg Oral Q12H [address] Mamie Mcdaniel PA-C   levothyroxine 112 mcg Oral Early Morning Darline Tinoco MD   melatonin 3 mg Oral HS Darline Tinoco MD   ondansetron 4 mg Intravenous Q4H PRN Odalys Castellanos MD   pantoprazole 40 mg Oral Early Morning Darline Tinoco MD   senna-docusate sodium 1 tablet Oral BID Mamie Mcdaniel PA-C     Continuous Infusions:     PRN Meds:     bisacodyl 10 mg Daily PRN   fentanyl citrate (PF) 50 mcg Q1H PRN   hydrALAZINE 5 mg Q6H PRN Labetalol HCl 10 mg Q4H PRN   ondansetron 4 mg Q4H PRN       Allergies   No Known Allergies  ---------------------------------------------------------------------------------------  Advance Directive and Living Will:     Level 3 DNAR/DNI  ---------------------------------------------------------------------------------------  Moses Amezquita MD  PGY3    Portions of the record may have been created with voice recognition software  Occasional wrong word or "sound a like" substitutions may have occurred due to the inherent limitations of voice recognition software    Read the chart carefully and recognize, using context, where substitutions have occurred

## 2020-02-14 NOTE — PHYSICAL THERAPY NOTE
Physical Therapy Evaluation Note    Patient Name: Germaine Beyer    QVYQT'L Date: 2020     Problem List  Principal Problem:    Intraparenchymal hemorrhage of brain [facility])       Past Medical History  Past Medical History:   Diagnosis Date    Anxiety     Arthritis     joints    Cataract     Disease of thyroid gland     Eczema     GERD (gastroesophageal reflux disease)     Gout     Heart failure ([address] )     Hepatitis     Hiatal hernia     Hypertension     Onychomycosis     last assessed: 16    Orthopnea     resolved: 16    Sleep apnea     wears CPAP        Past Surgical History  Past Surgical History:   Procedure Laterality Date    ABDOMINAL SURGERY          BRAIN HEMATOMA EVACUATION Right 2020    Procedure: Right decompressive craniectomy and evacuation of intraparenchymal hematoma; Surgeon: Lew De Leon MD;  Location:  MAIN OR;  Service: Neurosurgery    BREAST SURGERY Right     cyst removal    CARPAL TUNNEL RELEASE Left     CARPAL TUNNEL RELEASE Right     CATARACT EXTRACTION       SECTION  1960    x1    COLONOSCOPY N/A 2018    Procedure: COLONOSCOPY;  Surgeon: Rosanna Marcos MD;  Location: [facility] GI LAB; Service: Gastroenterology    DILATION AND CURETTAGE OF UTERUS  1967    EYE SURGERY Left 2006    cataracts    HERNIA REPAIR      umbilical hernia    INCISIONAL HERNIA REPAIR      incarcerated    KNEE ARTHROSCOPY Right     [place] XCAPSL CTRC RMVL INSJ IO LENS PROSTH W/O ECP Right 3/27/2017    Procedure: EXTRACTION EXTRACAPSULAR CATARACT PHACO INTRAOCULAR LENS (IOL);   Surgeon: Davi Banks MD;  Location: [facility] MAIN OR;  Service: Ophthalmology         20 1420   Note Type   Note type Eval only   Pain Assessment   Pain Assessment No/denies pain   Pain Score No Pain   Home Living   Type of [address] [facility] One level   Additional Comments Pt lives with her son and DIL in a [facility] with 2 KUMAR  Pt states that he grandson is home 24-7 and works from home so he can assist  Pt was I for ADL's and mobility prior  Pt used a rolator  Pt is able to drive short distances  Prior Function   Level of [place] Independent with ADLs and functional mobility   Lives With [facility] Help From Family   ADL Assistance Independent   IADLs Independent   Vocational Retired   Comments patient drives    Restrictions/Precautions   [place] Bobby Bearing Precautions Per Order No   Other Precautions Chair Alarm; Bed Alarm;Multiple lines;Telemetry; Fall Risk;O2, helmet   General   Family/Caregiver Present No   Cognition   Overall Cognitive Status WFL   Arousal/Participation Alert   Orientation Level Oriented X4   Memory Within functional limits   Following Commands Follows all commands and directions without difficulty   RLE Assessment   RLE Assessment WFL   LLE Assessment   LLE Assessment WFL   Bed Mobility   Supine to Sit 4  Minimal assistance   Additional items Assist x 1   Sit to Supine 4  Minimal assistance   Additional items Assist x 1   Transfers   Sit to Stand 3  Moderate assistance   Additional items Assist x 1   Stand to Sit 3  Moderate assistance   Additional items Assist x 1   Ambulation/Elevation   Gait pattern Step to;Shuffling   Gait Assistance 4  Minimal assist   Additional items Assist x 1   Assistive Device Other (Comment)  (HHA)   Distance 2ft to chair   Balance   Static Sitting Fair   Dynamic Sitting Fair   Static Standing Fair -   Dynamic Standing Fair -   Ambulatory Fair -   Endurance Deficit   Endurance Deficit Yes   Activity Tolerance   Activity Tolerance Patient limited by fatigue   Medical Staff Made Aware OT   Nurse Made Aware nurse approved therapy session   Assessment   Prognosis Good   Problem List Decreased strength;Decreased endurance; Impaired balance;Decreased mobility   Assessment Pt is a [de-identified] yo female admitted to [place] [id number] on 2/11/2020 s/p hypertension headache and nausea   Pt had surgery on 2/11/2020 R decompressive craniectomy and evacuation of intraparenchymal hematoma  Dx: intraparenchymal hemorrhage of brain  Pt extubated on 2/13/2020  Pt needs to wear helmet with mobility  Two patient identifiers were used to confirm  Pt lives with her son, LUDWIG and her grandson  Pt was I for ADL's and mobility prior with the use of a rolator  Pt drives short distances  Pt lives in a [facility] with 2 KUMAR  Pt's impairments include reduced mobility, limited endurance, high risk of falling  These impairments limit the ability of the patient to perform mobility without increased assistance, return to PLOF and participate in everyday life activities  Pt would benefit from continued skilled therapy while in the hospital to improve overall mobility and work towards a safe d/c  Recommend discharge to rehab  At the end of the session the patient was left in seated position with call bell and phone within reach  Barriers to Discharge Inaccessible home environment   Goals   STG Expiration Date 02/28/20   Short Term Goal #1 STG 1: Pt will perform transfers at a MI level to return to baseline of function  STG 2: Pt will ambulate 300ft with RW at a MI level to reduce the level of assistance needed upon d/c home  STG 3: Pt will negotiate 2 steps with HR at a S level to ensure safety with activity  STG 4: Pt will perform bed mobility at a I level  PT Treatment Day 0   Plan   Treatment/Interventions Functional transfer training;LE strengthening/ROM; Therapeutic exercise; Endurance training;Elevations;Gait training;Bed mobility; Equipment eval/education   PT Frequency Other (Comment)  (3-5xwk)   Recommendation   Recommendation Post acute IP rehab   PT - OK to Discharge No   Additional Comments need to trial steps    Modified [place] Scale   Modified [place] Scale 3   Barthel Index   Feeding 10   Bathing 0   Grooming Score 0   Dressing Score 5   Bladder Score 0   Bowels Score 5   Toilet Use Score 5   Transfers (Bed/Chair) Score 10   Mobility (Level Surface) Score 0   Stairs Score 0   Barthel Index Score 35   Daphne Kelsey, Pt, DPT

## 2020-02-14 NOTE — PROGRESS NOTES
02/14/20 1000   Clinical Encounter Type   Visited With Health care provider   Routine Visit Follow-up   Continue Visiting Yes   Crisis Visit Critical Care   Jew Encounters   Jew Needs Prayer  (silent prayer)

## 2020-02-14 NOTE — SPEECH THERAPY NOTE
Speech Language/Pathology  Speech/Language Pathology  Assessment    Patient Name: Kathi Quinn  LFESO'C Date: 2020     Problem List  Principal Problem:    Intraparenchymal hemorrhage of brain [facility])    Past Medical History  Past Medical History:   Diagnosis Date    Anxiety     Arthritis     joints    Cataract     Disease of thyroid gland     Eczema     GERD (gastroesophageal reflux disease)     Gout     Heart failure ([address] )     Hepatitis     Hiatal hernia     Hypertension     Onychomycosis     last assessed: 16    Orthopnea     resolved: 16    Sleep apnea     wears CPAP     Past Surgical History  Past Surgical History:   Procedure Laterality Date    ABDOMINAL SURGERY          BRAIN HEMATOMA EVACUATION Right 2020    Procedure: Right decompressive craniectomy and evacuation of intraparenchymal hematoma; Surgeon: Krystle Ramos MD;  Location:  MAIN OR;  Service: Neurosurgery    BREAST SURGERY Right     cyst removal    CARPAL TUNNEL RELEASE Left     CARPAL TUNNEL RELEASE Right     CATARACT EXTRACTION       SECTION  1960    x1    COLONOSCOPY N/A 2018    Procedure: COLONOSCOPY;  Surgeon: Kaylee Cruz MD;  Location[facility]; Service: Gastroenterology    DILATION AND CURETTAGE OF UTERUS  1967    EYE SURGERY Left 2006    cataracts    HERNIA REPAIR      umbilical hernia    INCISIONAL HERNIA REPAIR      incarcerated    KNEE ARTHROSCOPY Right     GA XCAPSL CTRC RMVL INSJ IO LENS PROSTH W/O ECP Right 3/27/2017    Procedure: EXTRACTION EXTRACAPSULAR CATARACT PHACO INTRAOCULAR LENS (IOL); Surgeon: Jerrod Maldonado MD;  Location: [facility] MAIN OR;  Service: Ophthalmology     Kathi Quinn is a [de-identified] y o  female with past medical history of hypothyroidism, combined systolic diastolic heart failure, hypertension who presented with headache and nausea, altered mental status to [facility]    Patient reported that she had an headache starting yesterday evening and 1 episode of vomiting; patient's family at bedside reported altered mental status and elevated blood pressure at home  Patient was brought to ED for evaluation where CT scan showed acute intraparenchymal hemorrhage of right temporal lobe  Case was discussed with Neurosurgery who recommended transfer to [facility] for further evaluation  Patient was given 25 g of 20% mannitol on route      On arrival to [facility], pt was AAOx3 with no acute complaints  Family at bedside reported continued slowed speech  Intubated 2/11/20- 2/13/20    24hr events:   Patient successfully extubated yesterday 2/13/2020  Patient is postop day 3 from emergent decompressive craniectomy and evacuation of intraparenchymal hematoma  Patient continues to have [place] intracranial ICP monitor no elevated ICPs overnight  No agitation overnight      CT Head 2/12/20:  Interval postoperative changes of right-sided decompressive craniectomy and evacuation of a large right temporal lobe intraparenchymal hematoma  Interval decrease in mass effect on the right lateral ventricle  No midline shift      No hydrocephalus  CTA Head and neck 2/10/20:   Reidentified large acute right temporal intraparenchymal hemorrhage with mass effect on the right lateral ventricular system and minimal to mild right to left midline shift      No focal stenosis or saccular aneurysm within the [place]      No hemodynamically significant stenosis within either common or internal carotid artery  Less than 50% stenosis by NASCET criteria      Partially visualized left pleural effusion  CXR 2/10/20:    Stable compared to prior  No acute cardiopulmonary disease         Bedside Swallow Evaluation:    Summary:  Pt presents w/ functional oropharyngeal swallow for po intake w/o overt s/s dysphagia or aspiration  Pt w/ appropriate retrieval and oral manipulation of liquids and solids   Swallow is prompt w/ wfl laryngeal rise upon palpation  No overt coughing, throat clearing, or wet vocal quality observed w/ po  Audible sigh noted after all swallows  Voicing was initially weak, but significantly improved w/ po introduction & progression  Recommendations:  Diet: Regular   Liquid: Thin   Meds: As tolerated   Supervision: Intermittent, monitor for s/s aspiration   Positioning:Upright  Strategies: Pt to take PO/Meds only when fully alert and upright  Oral care: At least 2x/day   Aspiration precautions    Eval only, No f/u tx indicated  Goal(s):  Pt will tolerate least restrictive diet w/out s/s aspiration or oral/pharyngeal difficulties  Patient's goal: "I'm very thirsty"    Reason for consult:  R/o aspiration  Determine safest and least restrictive diet  Change in mental status  New neuro event  Stroke protocol  S/p extubation    Precautions:  Fall    Current diet:  NPO     Premorbid diet[de-identified]  Regular w/ thin     Previous VBS:  -    O2 requirement:  NC    Voice/Speech:  Mild hoarse, improved w/ po     Social:  Home w/ family     Follows commands:  Yes for oral mech                          Cognitive Status:  A&Ax3    Oral [facility] exam:  Dentition: natural  Labial strength and ROM: wfl   Lingual strength and ROM: wfl   Mandibular strength and ROM: wfl   Velum: bilateral movement   Secretion management: dry mucosa     Items administered:  Puree, soft solid, hard solid, honey thick liquid, nectar thick liquid, thin liquids   Liquids were taken by tsp, straw, cup     Oral stage:  Lip closure: wfl   Mastication: prolonged for harder solids but effective  Bolus formation: wfl   Bolus control: wfl   Transfer: wfl   Oral residue: none   Pocketing: none     Pharyngeal stage:  Swallow promptness: prompt  Laryngeal rise: wfl upon palpation   Wet voice: none   Throat clear: none   Cough: none   Secondary swallows: none   Audible swallows: w/ liquids     Esophageal stage:  No s/s reported    Aspiration precautions posted    Results d/w:  Pt, nursing

## 2020-02-14 NOTE — SOCIAL WORK
PT/OT recommending STR at d/c  PM&R consult pending  TC to pt LUDWIG Nasra Lackey and son Jon Herron  CM discussed same  Family plans to come tomorrow to  lists - family aware will be in the room  CM requested options from both Acute and SNF  CM to follow  A post acute care recommendation was made by your care team for STR  Discussed Freedom of Choice with POA  List of facilities given to POA via in person  POA aware the list is custom filtered for them by zip code location and that [facility] post acute providers are designated

## 2020-02-14 NOTE — ASSESSMENT & PLAN NOTE
POD3 Right decompressive craniectomy and evacuation of intraparenchymal hematoma   · ICH 1 on initial presentation, ICH 2 prior to OR with repeat CT head (increase in volume)  · Patient with exam decline, leading to surgical decompression  · GCS 15 and RASCON with equal strength  Good DST  · Codman ICP monitor (0-5), VIRGINIE drain (130CC - less red in color c/w CSF), SDD to 0mmHg (10cc)    Imaging:  · CT head w/o, 2/11/20: acute intraparenchymal hemorrhage involving the right temporal lobe with surrounding vasogenic edema and mass effect on the right lateral ventricular system  The size of this hematoma measures approximately 7 5 x 4 0 x 2 3 cm, minimally increased in size from the prior exam   There is approximately 4 mm of right-to-left midline shift, stable from the prior exam   · CT head w/o, 2/12/20: Interval postoperative changes of right-sided decompressive craniectomy and evacuation of a large right temporal lobe intraparenchymal hematoma  Interval decrease in mass effect on the right lateral ventricle  No midline shift  Plan:  · Continue to monitor neurological exam  · Drain management  · VIRGINIE subgaleal: 130ml/24H - discontinue and suture tied  No complications  Site dry post pull  Lighter color c/w CSF  · SDD/cavity drain: 10ml/24H - discontinue and suture placed  No complications  Site dry post pull  · Monitor ICPs (0-4 today, 1-4 in room)  · ICP monitor discontinued at 1130  · Keppra 500mg BID x7 days  · -150  · PT/OT/SLP -   · cleared speech for regular diet  · PT/OT eval pending  Helmet in room  Cleared to wear helmet and mobilize  · Neurology following for stroke workup  · Pain control per primary team  · DVT ppx: SCDs, okay for HSQ starting 2/14/20 1930  · May transfer to floor if stable after 2-4 hours  [place] [facility] [place] will continue to follow at this time  Please don't hesitate to contact us with any additional questions  Scheduled postoperative follow-up

## 2020-02-15 ENCOUNTER — APPOINTMENT (INPATIENT)
Dept: RADIOLOGY | Facility: HOSPITAL | Age: 80
DRG: 023 | End: 2020-02-15
Payer: MEDICARE

## 2020-02-15 PROBLEM — D72.829 LEUKOCYTOSIS: Status: ACTIVE | Noted: 2020-02-15

## 2020-02-15 LAB
ANION GAP SERPL CALCULATED.3IONS-SCNC: 4 MMOL/L (ref 4–13)
BASOPHILS # BLD AUTO: 0.02 THOUSANDS/ΜL (ref 0–0.1)
BASOPHILS NFR BLD AUTO: 0 % (ref 0–1)
BUN SERPL-MCNC: 29 MG/DL (ref 5–25)
CALCIUM SERPL-MCNC: 8.8 MG/DL (ref 8.3–10.1)
CHLORIDE SERPL-SCNC: 112 MMOL/L (ref 100–108)
CO2 SERPL-SCNC: 28 MMOL/L (ref 21–32)
CREAT SERPL-MCNC: 0.92 MG/DL (ref 0.6–1.3)
EOSINOPHIL # BLD AUTO: 0.01 THOUSAND/ΜL (ref 0–0.61)
EOSINOPHIL NFR BLD AUTO: 0 % (ref 0–6)
ERYTHROCYTE [DISTWIDTH] IN BLOOD BY AUTOMATED COUNT: 16.4 % (ref 11.6–15.1)
GFR SERPL CREATININE-BSD FRML MDRD: 59 ML/MIN/1.73SQ M
GLUCOSE SERPL-MCNC: 100 MG/DL (ref 65–140)
HCT VFR BLD AUTO: 36.1 % (ref 34.8–46.1)
HGB BLD-MCNC: 11 G/DL (ref 11.5–15.4)
IMM GRANULOCYTES # BLD AUTO: 0.16 THOUSAND/UL (ref 0–0.2)
IMM GRANULOCYTES NFR BLD AUTO: 1 % (ref 0–2)
LYMPHOCYTES # BLD AUTO: 0.98 THOUSANDS/ΜL (ref 0.6–4.47)
LYMPHOCYTES NFR BLD AUTO: 7 % (ref 14–44)
MAGNESIUM SERPL-MCNC: 2.4 MG/DL (ref 1.6–2.6)
MCH RBC QN AUTO: 27 PG (ref 26.8–34.3)
MCHC RBC AUTO-ENTMCNC: 30.5 G/DL (ref 31.4–37.4)
MCV RBC AUTO: 89 FL (ref 82–98)
MONOCYTES # BLD AUTO: 1.2 THOUSAND/ΜL (ref 0.17–1.22)
MONOCYTES NFR BLD AUTO: 8 % (ref 4–12)
NEUTROPHILS # BLD AUTO: 12.14 THOUSANDS/ΜL (ref 1.85–7.62)
NEUTS SEG NFR BLD AUTO: 84 % (ref 43–75)
NRBC BLD AUTO-RTO: 0 /100 WBCS
PHOSPHATE SERPL-MCNC: 2.8 MG/DL (ref 2.3–4.1)
PLATELET # BLD AUTO: 200 THOUSANDS/UL (ref 149–390)
PMV BLD AUTO: 11.7 FL (ref 8.9–12.7)
POTASSIUM SERPL-SCNC: 3.5 MMOL/L (ref 3.5–5.3)
RBC # BLD AUTO: 4.08 MILLION/UL (ref 3.81–5.12)
SODIUM SERPL-SCNC: 144 MMOL/L (ref 136–145)
WBC # BLD AUTO: 14.51 THOUSAND/UL (ref 4.31–10.16)

## 2020-02-15 PROCEDURE — 70544 MR ANGIOGRAPHY HEAD W/O DYE: CPT

## 2020-02-15 PROCEDURE — 80048 BASIC METABOLIC PNL TOTAL CA: CPT | Performed by: INTERNAL MEDICINE

## 2020-02-15 PROCEDURE — A9585 GADOBUTROL INJECTION: HCPCS | Performed by: INTERNAL MEDICINE

## 2020-02-15 PROCEDURE — 85025 COMPLETE CBC W/AUTO DIFF WBC: CPT | Performed by: INTERNAL MEDICINE

## 2020-02-15 PROCEDURE — 84100 ASSAY OF PHOSPHORUS: CPT | Performed by: INTERNAL MEDICINE

## 2020-02-15 PROCEDURE — 99024 POSTOP FOLLOW-UP VISIT: CPT | Performed by: PHYSICIAN ASSISTANT

## 2020-02-15 PROCEDURE — 99232 SBSQ HOSP IP/OBS MODERATE 35: CPT | Performed by: PHYSICIAN ASSISTANT

## 2020-02-15 PROCEDURE — 83735 ASSAY OF MAGNESIUM: CPT | Performed by: INTERNAL MEDICINE

## 2020-02-15 PROCEDURE — 70551 MRI BRAIN STEM W/O DYE: CPT

## 2020-02-15 RX ADMIN — LEVETIRACETAM 500 MG: 500 TABLET, FILM COATED ORAL at 08:29

## 2020-02-15 RX ADMIN — MELATONIN 3 MG: 3 TAB ORAL at 21:22

## 2020-02-15 RX ADMIN — LEVETIRACETAM 500 MG: 500 TABLET, FILM COATED ORAL at 21:22

## 2020-02-15 RX ADMIN — SENNOSIDES AND DOCUSATE SODIUM 1 TABLET: 8.6; 5 TABLET ORAL at 17:37

## 2020-02-15 RX ADMIN — LOSARTAN POTASSIUM 25 MG: 25 TABLET, FILM COATED ORAL at 08:29

## 2020-02-15 RX ADMIN — AMLODIPINE BESYLATE 10 MG: 10 TABLET ORAL at 08:29

## 2020-02-15 RX ADMIN — PANTOPRAZOLE SODIUM 40 MG: 40 TABLET, DELAYED RELEASE ORAL at 05:52

## 2020-02-15 RX ADMIN — HEPARIN SODIUM 7500 UNITS: 5000 INJECTION INTRAVENOUS; SUBCUTANEOUS at 15:25

## 2020-02-15 RX ADMIN — SENNOSIDES AND DOCUSATE SODIUM 1 TABLET: 8.6; 5 TABLET ORAL at 08:29

## 2020-02-15 RX ADMIN — HEPARIN SODIUM 7500 UNITS: 5000 INJECTION INTRAVENOUS; SUBCUTANEOUS at 21:22

## 2020-02-15 RX ADMIN — BUMETANIDE 2 MG: 2 TABLET ORAL at 08:33

## 2020-02-15 RX ADMIN — CHLORHEXIDINE GLUCONATE 0.12% ORAL RINSE 15 ML: 1.2 LIQUID ORAL at 08:29

## 2020-02-15 RX ADMIN — ATORVASTATIN CALCIUM 40 MG: 40 TABLET, FILM COATED ORAL at 17:37

## 2020-02-15 RX ADMIN — LEVOTHYROXINE SODIUM 112 MCG: 112 TABLET ORAL at 05:52

## 2020-02-15 RX ADMIN — GADOBUTROL 10 ML: 604.72 INJECTION INTRAVENOUS at 02:10

## 2020-02-15 RX ADMIN — CHLORHEXIDINE GLUCONATE 0.12% ORAL RINSE 15 ML: 1.2 LIQUID ORAL at 21:22

## 2020-02-15 RX ADMIN — HEPARIN SODIUM 7500 UNITS: 5000 INJECTION INTRAVENOUS; SUBCUTANEOUS at 05:52

## 2020-02-15 NOTE — ASSESSMENT & PLAN NOTE
· Follows with Dr Jaylene Palmer as outpatient  · Per most recent outpatient nephrology office visit, suspect a new baseline creatinine 1 3  · Renal function at baseline  · Monitor

## 2020-02-15 NOTE — ASSESSMENT & PLAN NOTE
Wt Readings from Last 3 Encounters:   02/13/20 108 kg (237 lb 3 4 oz)   02/12/20 94 8 kg (208 lb 15 9 oz)   02/10/20 94 8 kg (208 lb 15 9 oz)

## 2020-02-15 NOTE — PROGRESS NOTES
Progress Note - Dior Block 1940, [de-identified] y o  female MRN: [id number]    Unit/Bed#: [facility] 607-01 Encounter: [id number]    Primary Care Provider: Polina Dow MD   Date and time admitted to hospital: 2/10/2020 10:18 PM        * Intraparenchymal hemorrhage of brain [facility])  Assessment & Plan  · Patient initially presented to the [facility] Emergency Department on 2/10/2020 with elevated blood pressure, headache, and nausea  CT of the head revealed acute intracerebral hemorrhage on the right temporal lobe with mild mass effect but no midline shift or herniation  CTA head and neck revealed no focal stenosis or saccular aneurysm within the [place], no hemodynamically significant stenosis within either common or internal carotid artery  She was transferred to [facility] ICU  On 02/11/2020 patient noted to have worsening neurologic status and a repeat CT head was performed which revealed increase in the intraparenchymal hematoma  Patient underwent urgent right decompressive craniectomy and evacuation of intraparenchymal hematoma by Dr Graciela Gonzalez on 2/11/2020  CT head the following day revealed postoperative changes and decrease in mass effect on the right lateral ventricle, no midline shift or hydrocephalus  The patient remained intubated postoperatively  She was extubated on 2/13/2020  She was transferred out of the ICU on 02/14/2020  MRI on 2/15/2020 was degraded by motion artifact but showed drainage of large temporal hemorrhage, brought right frontoparietal craniectomy, expected postoperative changes with no large branch ischemic disease  MRV showed no evidence of dural venous sinus thrombosis    · Neurology and Neurosurgery are following; appreciate their ongoing recommendations   · On Keppra for seizure prophylaxis for 7 days total (today is day #5)  · Goal SBP per Neurosurgery <160  · PT/OT recommending rehab  · SLP cleared for regular diet  · Was started on DVT prophylactic SC heparin evening of 2/14/2020  · Patient has NGT which was placed in ICU  Per discussion with nurse, patient had poor PO intake yesterday  Patient requested fresh fruit this AM for breakfast and is eating  If improved PO intake will remove NGT    Benign essential hypertension  Assessment & Plan  · Goal SBP per neurosurgery <160  · Currently on 10 mg amlodipine daily, 2 mg Bumex p o  Daily, 25 mg losartan daily  · Blood pressure at goal    Chronic diastolic (congestive) heart failure (HCC)  Assessment & Plan  Wt Readings from Last 3 Encounters:   02/15/20 91 6 kg (201 lb 15 1 oz)   02/12/20 94 8 kg (208 lb 15 9 oz)   02/10/20 94 8 kg (208 lb 15 9 oz)     · Echo this admission revealed left ventricular ejection fraction of 65%  Abnormal left ventricular diastolic function        CKD (chronic kidney disease), stage III (HCC)  Assessment & Plan  · Follows with Dr Lieutenant Smith as outpatient  · Per most recent outpatient nephrology office visit, suspect a new baseline creatinine 1 3  · Renal function at baseline  · Monitor    Gastroesophageal reflux disease without esophagitis  Assessment & Plan  · Continue PPI    Hypothyroidism  Assessment & Plan  · Continue levothyroxine    Morbid obesity with body mass index of 40 0-44 9 in [facility])  Assessment & Plan  · Body mass index is 38 16 kg/m²  · Nutrition consult    Hyperlipemia  Assessment & Plan  · LDL this admission 103  · Continue statin    Leukocytosis  Assessment & Plan  · Suspect steroid induced  · Monitor closely      VTE Pharmacologic Prophylaxis:   Pharmacologic: Heparin  Mechanical VTE Prophylaxis in Place: Yes    Patient Centered Rounds: I have performed bedside rounds with nursing staff today   Diane Daniel    Discussions with Specialists or Other Care Team Provider: neurology     Education and Discussions with Family / Patient: patient; I attempted to call the patient's son, Darrel Ramires, with a routine update however no one answered    Time Spent for Care: 30 minutes  More than 50% of total time spent on counseling and coordination of care as described above  Current Length of Stay: 5 day(s)    Current Patient Status: Inpatient   Certification Statement: The patient will continue to require additional inpatient hospital stay due to not medically stable for d/c    Discharge Plan: pending neurology recs  PT and OT recommending rehab    Code Status: Level 3 - DNAR and DNI      Subjective:   Ms Suzette Guillermo reports generalized abdominal discomfort, but otherwise no complaint  Had a BM yesterday, nursing confirms  Is hungry this AM and requests fresh fruit for breakfast      Objective:     Vitals:   Temp (24hrs), Av 8 °F (37 1 °C), Min:98 4 °F (36 9 °C), Max:99 3 °F (37 4 °C)    Temp:  [98 4 °F (36 9 °C)-99 3 °F (37 4 °C)] 99 3 °F (37 4 °C)  HR:  [62-76] 68  Resp:  [14-22] 17  BP: (133-174)/(49-70) 144/54  SpO2:  [93 %-99 %] 97 %  Body mass index is 38 16 kg/m²  Input and Output Summary (last 24 hours): Intake/Output Summary (Last 24 hours) at 2/15/2020 1004  Last data filed at 2/15/2020 0900  Gross per 24 hour   Intake 1510 ml   Output 635 ml   Net 875 ml       Physical Exam:     Physical Exam   Constitutional: She is oriented to person, place, and time  Patient seen lying in bed comfortably resting with her nurse present  She was sleeping when we entered the room and awoke easily to her name being called  Pleasant and cooperative   HENT:   Incision line on scalp with staples   Cardiovascular: Normal rate and regular rhythm  Pulmonary/Chest: Effort normal and breath sounds normal  No respiratory distress  She has no wheezes  Abdominal: Soft  Bowel sounds are normal  There is no tenderness  Musculoskeletal: She exhibits no edema  Neurological: She is alert and oriented to person, place, and time  Sri Mariela accent  Tongue is midline  Follows all commands  Moves all 4 extremities equally    Reports equal and intact sensation to light touch on bilateral upper and lower extremities   Skin: Skin is warm  Psychiatric: She has a normal mood and affect  Her behavior is normal    Vitals reviewed  Additional Data:     Labs:    Results from last 7 days   Lab Units 02/15/20  0453   WBC Thousand/uL 14 51*   HEMOGLOBIN g/dL 11 0*   HEMATOCRIT % 36 1   PLATELETS Thousands/uL 200   NEUTROS PCT % 84*   LYMPHS PCT % 7*   MONOS PCT % 8   EOS PCT % 0     Results from last 7 days   Lab Units 02/15/20  0453  02/11/20  0806   SODIUM mmol/L 144   < > 141   POTASSIUM mmol/L 3 5   < > 3 6   CHLORIDE mmol/L 112*   < > 106   CO2 mmol/L 28   < > 31   CO2, I-STAT   --    < >  --    BUN mg/dL 29*   < > 21   CREATININE mg/dL 0 92   < > 1 14   ANION GAP mmol/L 4   < > 4   CALCIUM mg/dL 8 8   < > 9 1   ALBUMIN g/dL  --   --  3 1*   TOTAL BILIRUBIN mg/dL  --   --  0 49   ALK PHOS U/L  --   --  132*   ALT U/L  --   --  19   AST U/L  --   --  15   GLUCOSE RANDOM mg/dL 100   < > 128    < > = values in this interval not displayed  Results from last 7 days   Lab Units 02/10/20  2043   INR  1 13         Results from last 7 days   Lab Units 02/11/20  0604   HEMOGLOBIN A1C % 6 1               * I Have Reviewed All Lab Data Listed Above  * Additional Pertinent Lab Tests Reviewed:  All Labs Within Last 24 Hours Reviewed    Imaging:    Imaging Reports Reviewed Today Include: CTs, CTA, MRI, MRV, echo as above  Imaging Personally Reviewed by Myself Includes:  none    Recent Cultures (last 7 days):           Last 24 Hours Medication List:     Current Facility-Administered Medications:  acetaminophen 650 mg Oral Q6H PRN Mariah Mondragon MD   amLODIPine 10 mg Oral Daily Mariah Mondragon MD   atorvastatin 40 mg Oral QPM Mariah Mondragon MD   bisacodyl 10 mg Rectal Daily PRN Mariah Mondragon MD   bumetanide 2 mg Oral Daily Mariah Mondragon MD   chlorhexidine 15 mL Swish & Spit Q12H Dotty Bhagat MD   heparin (porcine) 7,500 Units Subcutaneous [facility] Mariah Mondragon MD   hydrALAZINE 5 mg Intravenous Q6H PRN Mariah Mondragon MD   Labetalol HCl 10 mg Intravenous Q4H PRN Mariah Mondragon MD   levETIRAcetam 500 mg Oral Q12H Marina De La Rosa MD   levothyroxine 112 mcg Oral Early Morning Mariah Mondragon MD   losartan 25 mg Oral Daily Mariah Mondragon MD   melatonin 3 mg Oral HS Mariah Mondragon MD   ondansetron 4 mg Intravenous Q4H PRN Mariah Mondragon MD   oxyCODONE 2 5 mg Oral Q6H PRN Mariah Mondragon MD   pantoprazole 40 mg Oral Early Morning Mariah Mondragon MD   polyethylene glycol 17 g Oral Daily PRN Mariah Mondragon MD   senna-docusate sodium 1 tablet Oral BID Mariah Mondragon MD        Today, Patient Was Seen By: Ben Sanz PA-C    ** Please Note: Dictation voice to text software may have been used in the creation of this document   **

## 2020-02-15 NOTE — QUICK NOTE
Notified by RN unable to obtain MRI due to patient not laying still  Denies feeling anxious  No change in neuro exams  Went to MRI to speak with patient, does not appear anxious or restless  Stated she was just cold, gave warm blankets  Unsuccessful attempt at imaging again  Chart quickly reviewed, transfer from ICU yesterday evening  Patient does not take any benzodiazepines or anti anxiety medications outpatient  Had not yet received contrast for MRI tonight  Not in any distress, decision made not to administer medication at this time and attempt for MRI tomorrow

## 2020-02-15 NOTE — PROGRESS NOTES
Progress Note - Rony Kwasi 1940, [de-identified] y o  female MRN: [id number]    Unit/Bed#: [facility] 607-01 Encounter: [id number]    Primary Care Provider: Doreen Floyd MD   Date and time admitted to hospital: 2/10/2020 10:18 PM        * Intraparenchymal hemorrhage of brain [facility])  Assessment & Plan  POD4 Right decompressive craniectomy and evacuation of intraparenchymal hematoma   · ICH 1 on initial presentation, ICH 2 prior to OR with repeat CT head (increase in volume)  · Patient with exam decline, leading to surgical decompression  · GCS 15 and RASCON with equal strength  Good DST  · Codman ICP monitor (0-5), VIRGINIE drain (130CC - less red in color c/w CSF), SDD to 0mmHg (10cc)    Imaging:  · CT head w/o, 2/11/20: acute intraparenchymal 4involving the right temporal lobe with surrounding vasogenic edema and mass effect on the right lateral ventricular system  The size of this hematoma measures approximately 7 5 x 4 0 x 2 3 cm, minimally increased in size from the prior exam   There is approximately 4 mm of right-to-left midline shift, stable from the prior exam   · CT head w/o, 2/12/20: Interval postoperative changes of right-sided decompressive craniectomy and evacuation of a large right temporal lobe intraparenchymal hematoma  Interval decrease in mass effect on the right lateral ventricle  No midline shift  · MRI, MRV brain pending official radiologist read    Plan:  · Stat CT head without contrast if decline in GCS >2pts/1h  · One subdural, VIRGINIE and codman ICP drains removed on 7/10/8592 without complications  · Craniectomy and seizure precautions   · Helmet when OOB   · Keppra 500mg BID x7 days  · -150  · PT/OT/SLP   · Pain control per primary team  · DVT ppx: SCDs, HSQ    Neurosurgery will start to follow on the periphery  Please don't hesitate to contact us with any additional questions  Scheduled postoperative follow-up       CKD (chronic kidney disease), stage III [facility])  Assessment & Plan  Management per primary team     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)  Assessment & Plan  Wt Readings from Last 3 Encounters:   02/13/20 108 kg (237 lb 3 4 oz)   02/12/20 94 8 kg (208 lb 15 9 oz)   02/10/20 94 8 kg (208 lb 15 9 oz)             Benign essential hypertension  Assessment & Plan  SBP goal <160        Subjective/Objective   Chief Complaint: "I'm okay"    Subjective: patient denies any complaints except she wishes for some ice water  She denies any headaches, dizziness, chest pain, SOB, abdominal pain/N/V, weakness    Objective: laying in bed, NAD  I/O       02/13 0701 - 02/14 0700 02/14 0701 - 02/15 0700 02/15 0701 - 02/16 0700    P  O   1320     I V  (mL/kg) 149 4 (1 4)      NG/ 100     Total Intake(mL/kg) 269 4 (2 5) 1420 (15 5)     Urine (mL/kg/hr) 1200 (0 5) 760 (0 3)     Emesis/NG output 300      Drains 140 57     Stool  0     Total Output 1640 817     Net -1370 6 +603            Unmeasured Stool Occurrence  2 x           Invasive Devices     Peripheral Intravenous Line            Peripheral IV 02/12/20 Left Forearm 3 days    Peripheral IV 02/12/20 Right;Dorsal (posterior) Forearm 2 days    Peripheral IV 02/12/20 Right;Ventral (anterior) Forearm 2 days          Drain            NG/OG/Enteral Tube Orogastric Center mouth 3 days                Physical Exam:  Vitals: Blood pressure 144/55, pulse 68, temperature 98 4 °F (36 9 °C), temperature source Oral, resp  rate 18, height 5' 1" (1 549 m), weight 91 6 kg (201 lb 15 1 oz), SpO2 98 %, not currently breastfeeding  ,Body mass index is 38 16 kg/m²  General appearance: resting comfortably, easily aroused, appears stated age, cooperative and no distress  Head: Normocephalic, right craniectomy flap is full, soft  No erythema or edema  Incision well approximated with staples  Eyes: EOMI, PERRL  Lungs: non labored breathing  Heart: regular heart rate  Neurologic:   Mental status: Alert, GCS 15  Oriented x3, thought content appropriate  Speech is fluent, clear  Able to repeat a sentence, perform simple math, identify 3/3 objects immediately and 1/3 objects delayed fashion  Cranial nerves: grossly intact (Cranial nerves II-XII)  Left facial weakness at rest and with expression  Tongue is midline  Sensory: normal to LT in bilateral upper and lower extremities  DST intact  Motor: moving all extremities without focal weakness  Reflexes: 1+ and symmetric  negative morales, negative clonus  Coordination: finger to nose normal bilaterally, no drift bilaterally      Lab Results:  Results from last 7 days   Lab Units 02/15/20  0453 02/14/20  0426 02/13/20  0537 02/11/20  0806   WBC Thousand/uL 14 51* 20 41* 13 76*   < > 13 51*   HEMOGLOBIN g/dL 11 0* 11 1* 10 8*   < > 11 6   I STAT HEMOGLOBIN   --   --   --    < >  --    HEMATOCRIT % 36 1 34 8 34 8   < > 36 9   HEMATOCRIT, ISTAT   --   --   --    < >  --    PLATELETS Thousands/uL 200 209 165   < > 182   NEUTROS PCT % 84*  --  91*  --  89*   MONOS PCT % 8  --  4  --  5   MONO PCT %  --  4  --   --   --     < > = values in this interval not displayed  Results from last 7 days   Lab Units 02/15/20  0453 02/14/20  0426 02/13/20  0537  02/11/20  1011 02/11/20  0806 02/10/20  2005   POTASSIUM mmol/L 3 5 3 6 3 7   < >  --  3 6 4 9   CHLORIDE mmol/L 112* 114* 111*   < >  --  106 102   CO2 mmol/L 28 28 26   < >  --  31 26   CO2, I-STAT mmol/L  --   --   --   --  28  --   --    BUN mg/dL 29* 37* 33*   < >  --  21 24   CREATININE mg/dL 0 92 1 06 1 18   < >  --  1 14 1 05   CALCIUM mg/dL 8 8 8 9 8 5   < >  --  9 1 9 3   ALK PHOS U/L  --   --   --   --   --  132* 153*   ALT U/L  --   --   --   --   --  19 26   AST U/L  --   --   --   --   --  15 41   GLUCOSE, ISTAT mg/dl  --   --   --   --  124  --   --     < > = values in this interval not displayed       Results from last 7 days   Lab Units 02/15/20  0453 02/14/20  0426 02/12/20  0626   MAGNESIUM mg/dL 2 4 2 5 2 1     Results from last 7 days   Lab Units 02/15/20  0453 02/14/20  0426 02/12/20  0626   PHOSPHORUS mg/dL 2 8 3 3 2 7     Results from last 7 days   Lab Units 02/10/20  2043   INR  1 13   PTT seconds 31     No results found for: TROPONINT  ABG:  Lab Results   Component Value Date    PHART 7 485 (H) 02/12/2020    GCF5HWS 33 8 (L) 02/12/2020    PO2ART 84 0 02/12/2020    WPW5XOD 24 9 02/12/2020    BEART 1 7 02/12/2020    SOURCE Line, Arterial 02/12/2020       Imaging Studies: I have personally reviewed pertinent reports  and I have personally reviewed pertinent films in PACS  CT head wo contrast   Final Result      Interval postoperative changes of right-sided decompressive craniectomy and evacuation of a large right temporal lobe intraparenchymal hematoma  Interval decrease in mass effect on the right lateral ventricle  No midline shift  No hydrocephalus  Workstation performed: [id number]         CT head wo contrast   Final Result      Reidentified large acute intraparenchymal hemorrhage involving the right temporal lobe with mass effect on the right lateral ventricle and minimal to mild right to left midline shift  The size of this intraparenchymal hematoma has minimally increased    from the prior exam  primarily in the AP dimension  Workstation performed: [id number]         MRV head wo contrast    (Results Pending)   MRI brain wo contrast    (Results Pending)         EKG, Pathology, and Other Studies: I have personally reviewed pertinent reports        VTE  Prophylaxis: Sequential compression device (Venodyne)  and Heparin

## 2020-02-15 NOTE — ASSESSMENT & PLAN NOTE
POD4 Right decompressive craniectomy and evacuation of intraparenchymal hematoma   · ICH 1 on initial presentation, ICH 2 prior to OR with repeat CT head (increase in volume)  · Patient with exam decline, leading to surgical decompression  · GCS 15 and RASCON with equal strength  Good DST  · Codman ICP monitor (0-5), VIRGINIE drain (130CC - less red in color c/w CSF), SDD to 0mmHg (10cc)    Imaging:  · CT head w/o, 2/11/20: acute intraparenchymal 4involving the right temporal lobe with surrounding vasogenic edema and mass effect on the right lateral ventricular system  The size of this hematoma measures approximately 7 5 x 4 0 x 2 3 cm, minimally increased in size from the prior exam   There is approximately 4 mm of right-to-left midline shift, stable from the prior exam   · CT head w/o, 2/12/20: Interval postoperative changes of right-sided decompressive craniectomy and evacuation of a large right temporal lobe intraparenchymal hematoma  Interval decrease in mass effect on the right lateral ventricle  No midline shift  · MRI, MRV brain pending official radiologist read    Plan:  · Stat CT head without contrast if decline in GCS >2pts/1h  · One subdural, VIRGINIE and codman ICP drains removed on 9/85/6705 without complications  · Craniectomy and seizure precautions   · Helmet when OOB   · Keppra 500mg BID x7 days  · -150  · PT/OT/SLP   · Pain control per primary team  · DVT ppx: SCDs, HSQ    Neurosurgery will start to follow on the periphery  Please don't hesitate to contact us with any additional questions  Scheduled postoperative follow-up

## 2020-02-15 NOTE — ASSESSMENT & PLAN NOTE
· Goal SBP per neurosurgery <160  · Currently on 10 mg amlodipine daily, 2 mg Bumex p o   Daily, 25 mg losartan daily  · Blood pressure at goal

## 2020-02-15 NOTE — ASSESSMENT & PLAN NOTE
· Patient initially presented to the [facility] Emergency Department on 2/10/2020 with elevated blood pressure, headache, and nausea  CT of the head revealed acute intracerebral hemorrhage on the right temporal lobe with mild mass effect but no midline shift or herniation  CTA head and neck revealed no focal stenosis or saccular aneurysm within the [place], no hemodynamically significant stenosis within either common or internal carotid artery  She was transferred to [address] [place] [address] ICU  On 02/11/2020 patient noted to have worsening neurologic status and a repeat CT head was performed which revealed increase in the intraparenchymal hematoma  Patient underwent urgent right decompressive craniectomy and evacuation of intraparenchymal hematoma by Dr Haider Berg on 2/11/2020  CT head the following day revealed postoperative changes and decrease in mass effect on the right lateral ventricle, no midline shift or hydrocephalus  The patient remained intubated postoperatively  She was extubated on 2/13/2020  She was transferred out of the ICU on 02/14/2020  MRI on 2/15/2020 was degraded by motion artifact but showed drainage of large temporal hemorrhage, brought right frontoparietal craniectomy, expected postoperative changes with no large branch ischemic disease  MRV showed no evidence of dural venous sinus thrombosis  · Neurology and Neurosurgery are following; appreciate their ongoing recommendations   · On Keppra for seizure prophylaxis for 7 days total (today is day #5)  · Goal SBP per Neurosurgery <160  · PT/OT recommending rehab  · SLP cleared for regular diet  · Was started on DVT prophylactic SC heparin evening of 2/14/2020  · Patient has NGT which was placed in ICU  Per discussion with nurse, patient had poor PO intake yesterday  Patient requested fresh fruit this AM for breakfast and is eating   If improved PO intake will remove NGT

## 2020-02-15 NOTE — ASSESSMENT & PLAN NOTE
Wt Readings from Last 3 Encounters:   02/15/20 91 6 kg (201 lb 15 1 oz)   02/12/20 94 8 kg (208 lb 15 9 oz)   02/10/20 94 8 kg (208 lb 15 9 oz)     · Echo this admission revealed left ventricular ejection fraction of 65%    Abnormal left ventricular diastolic function

## 2020-02-15 NOTE — PROGRESS NOTES
Progress Note - Neurology   [facility] [de-identified] y o  female MRN: [id number]  Unit/Bed#: [facility] 607-01 Encounter: [id number]    Assessment/Plan:  48-year-old female with history of hypertension, WENDI on BiPAP, hypothyroid, CHF presenting initially at [facility] with altered mental status and headache with nausea and vomiting  Neuroimaging revealed evidence of right parietal/ temporal hemorrhage,  No vascular abnormality on CTA  Transferred to [facility] for neurosurgical management with decompressive craniotomy/evacuation  MRI brain overnight with no underlying mass lesion/ischemia,  No venous sinus thrombosis on MRV  Unclear definitive etiology, concern for possible amyloid/ hypertensive etiologies  1   Right temporal ICH:  - status post decompressive craniectomy/evacuation with Neurosurgery several days ago, neurosurgery continuing to follow  -  MRI brain overnight with no underlying mass lesion/ ischemia  - MRV with no venous sinus thrombosis concern  - CTA head/neck with no significant vascular pathology  - repeat CT head on 02/12 with significant improvement in ICH/ resolved midline shift  - holding antiplatelet/anticoagulation  - supportive care  - therapies following  -would recommend continuing to follow up with Neurosurgery Neurology as outpatient with repeat neuroimaging in several weeks (CT head ordered by Neurosurgery for approximately 1 month from now)  No further inpatient neurologic workup anticipated  Discussed plan of care with attending neurologist     Subjective:   Patient resting in bed side chair, family/friends at bedside  Patient continues to do well, has slight stomach ache today, no acute neurologic issues, no headache, moving extremities well, no vision or speech changes  Vitals: Blood pressure 144/54, pulse 68, temperature 99 3 °F (37 4 °C), resp  rate 17, height 5' 1" (1 549 m), weight 91 6 kg (201 lb 15 1 oz), SpO2 97 %, not currently breastfeeding  ,Body mass index is 38 16 kg/m²  Physical Exam:   Physical Exam   Constitutional: She is oriented to person, place, and time  She appears well-developed and well-nourished  HENT:   Head: Normocephalic and atraumatic  Helmet placed following craniectomy   Eyes: Pupils are equal, round, and reactive to light  Conjunctivae and EOM are normal    Neck: Normal range of motion  Neck supple  Cardiovascular: Normal rate and regular rhythm  Pulmonary/Chest: Effort normal and breath sounds normal    Abdominal: Soft  Bowel sounds are normal    Musculoskeletal: Normal range of motion  Neurological: She is alert and oriented to person, place, and time  She has a normal Finger-Nose-Finger Test    Skin: Skin is warm and dry  Neurologic Exam     Mental Status   Oriented to person, place, and time  Able to read  Able to repeat  Awake and alert, oriented, no aphasia or dysarthria, follows commands  Cranial Nerves     CN II   Visual fields full to confrontation  CN III, IV, VI   Pupils are equal, round, and reactive to light  Extraocular motions are normal      CN V   Facial sensation intact  CN VII   Facial expression full, symmetric  CN VIII   CN VIII normal      CN IX, X   CN IX normal    CN X normal      CN XI   CN XI normal      CN XII   CN XII normal      Motor Exam   Muscle bulk: normal  Overall muscle tone: normal  Right arm pronator drift: absent  Left arm pronator drift: absent  Full appearing strength/age-appropriate strength in the uppers and lowers throughout without lateralizing weakness or deficit  Sensory Exam   Light touch normal      No clear evidence of extinction with double simultaneous tactile stimulus in the proximal extremities  Gait, Coordination, and Reflexes     Coordination   Finger to nose coordination: normal    Tremor   Resting tremor: absent  Intention tremor: absent  Foot taps symmetric  Gait not assessed due to safety         Lab, Imaging and other studies:   MRI brain 02/15/2020: Study is degraded by motion artifact      Drainage of large temporal hemorrhage, broad right frontoparietal craniectomy, expected postoperative changes  No large branch ischemic disease  MRV head 02/15/2020: No evidence of dural venous sinus thrombosis  CBC:   Results from last 7 days   Lab Units 02/15/20  0453 02/14/20  0426 02/13/20  0537   WBC Thousand/uL 14 51* 20 41* 13 76*   RBC Million/uL 4 08 3 99 3 98   HEMOGLOBIN g/dL 11 0* 11 1* 10 8*   HEMATOCRIT % 36 1 34 8 34 8   MCV fL 89 87 87   PLATELETS Thousands/uL 200 209 165   , BMP/CMP:   Results from last 7 days   Lab Units 02/15/20  0453 02/14/20  0426 02/13/20  0537  02/11/20  1011 02/11/20  0806 02/10/20  2005   SODIUM mmol/L 144 148* 145   < >  --  141 138   POTASSIUM mmol/L 3 5 3 6 3 7   < >  --  3 6 4 9   CHLORIDE mmol/L 112* 114* 111*   < >  --  106 102   CO2 mmol/L 28 28 26   < >  --  31 26   CO2, I-STAT mmol/L  --   --   --   --  28  --   --    BUN mg/dL 29* 37* 33*   < >  --  21 24   CREATININE mg/dL 0 92 1 06 1 18   < >  --  1 14 1 05   GLUCOSE, ISTAT mg/dl  --   --   --   --  124  --   --    CALCIUM mg/dL 8 8 8 9 8 5   < >  --  9 1 9 3   AST U/L  --   --   --   --   --  15 41   ALT U/L  --   --   --   --   --  19 26   ALK PHOS U/L  --   --   --   --   --  132* 153*   EGFR ml/min/1 73sq m 59 50 44   < >  --  46 50    < > = values in this interval not displayed     , Vitamin B12:   , HgBA1C:   Results from last 7 days   Lab Units 02/11/20  0604   HEMOGLOBIN A1C % 6 1   , TSH:   , Coagulation:   Results from last 7 days   Lab Units 02/10/20  2043   INR  1 13   , Lipid Profile:   Results from last 7 days   Lab Units 02/11/20  0604   HDL mg/dL 45   LDL CALC mg/dL 103*   TRIGLYCERIDES mg/dL 146   , Ammonia:   , Urinalysis:       Invalid input(s): URIBILINOGEN, Drug Screen:   , Medication Drug Levels:       Invalid input(s): CARBAMAZEPINE,  PHENOBARB, LACOSAMIDE, OXCARBAZEPINE   VTE Prophylaxis: Sequential compression device (Venodyne) and Reason for no pharmacologic prophylaxis ICH     Total time spent today 25 minutes  Discussed plan of care with patient and primary team:  Reviewed MRI brain/MRV, potential etiologies of hemorrhage, doing well clinically, holding antiplatelet/anticoagulation, vascular Neurology and Neurosurgery follow-up as outpatient, continued therapies

## 2020-02-16 PROBLEM — E87.6 HYPOKALEMIA: Status: ACTIVE | Noted: 2020-02-16

## 2020-02-16 LAB
ANION GAP SERPL CALCULATED.3IONS-SCNC: 6 MMOL/L (ref 4–13)
BUN SERPL-MCNC: 21 MG/DL (ref 5–25)
CALCIUM SERPL-MCNC: 8.2 MG/DL (ref 8.3–10.1)
CHLORIDE SERPL-SCNC: 107 MMOL/L (ref 100–108)
CO2 SERPL-SCNC: 28 MMOL/L (ref 21–32)
CREAT SERPL-MCNC: 0.93 MG/DL (ref 0.6–1.3)
ERYTHROCYTE [DISTWIDTH] IN BLOOD BY AUTOMATED COUNT: 16 % (ref 11.6–15.1)
GFR SERPL CREATININE-BSD FRML MDRD: 58 ML/MIN/1.73SQ M
GLUCOSE SERPL-MCNC: 102 MG/DL (ref 65–140)
HCT VFR BLD AUTO: 34.9 % (ref 34.8–46.1)
HGB BLD-MCNC: 11 G/DL (ref 11.5–15.4)
MCH RBC QN AUTO: 27.6 PG (ref 26.8–34.3)
MCHC RBC AUTO-ENTMCNC: 31.5 G/DL (ref 31.4–37.4)
MCV RBC AUTO: 88 FL (ref 82–98)
PLATELET # BLD AUTO: 188 THOUSANDS/UL (ref 149–390)
PMV BLD AUTO: 11.4 FL (ref 8.9–12.7)
POTASSIUM SERPL-SCNC: 3.2 MMOL/L (ref 3.5–5.3)
RBC # BLD AUTO: 3.99 MILLION/UL (ref 3.81–5.12)
SODIUM SERPL-SCNC: 141 MMOL/L (ref 136–145)
WBC # BLD AUTO: 12.06 THOUSAND/UL (ref 4.31–10.16)

## 2020-02-16 PROCEDURE — 99232 SBSQ HOSP IP/OBS MODERATE 35: CPT | Performed by: PHYSICIAN ASSISTANT

## 2020-02-16 PROCEDURE — 85027 COMPLETE CBC AUTOMATED: CPT | Performed by: PHYSICIAN ASSISTANT

## 2020-02-16 PROCEDURE — 80048 BASIC METABOLIC PNL TOTAL CA: CPT | Performed by: PHYSICIAN ASSISTANT

## 2020-02-16 RX ORDER — POTASSIUM CHLORIDE 20 MEQ/1
40 TABLET, EXTENDED RELEASE ORAL ONCE
Status: COMPLETED | OUTPATIENT
Start: 2020-02-16 | End: 2020-02-16

## 2020-02-16 RX ADMIN — LEVETIRACETAM 500 MG: 500 TABLET, FILM COATED ORAL at 08:27

## 2020-02-16 RX ADMIN — HEPARIN SODIUM 7500 UNITS: 5000 INJECTION INTRAVENOUS; SUBCUTANEOUS at 13:28

## 2020-02-16 RX ADMIN — BUMETANIDE 2 MG: 2 TABLET ORAL at 08:27

## 2020-02-16 RX ADMIN — HEPARIN SODIUM 7500 UNITS: 5000 INJECTION INTRAVENOUS; SUBCUTANEOUS at 21:15

## 2020-02-16 RX ADMIN — SENNOSIDES AND DOCUSATE SODIUM 1 TABLET: 8.6; 5 TABLET ORAL at 17:13

## 2020-02-16 RX ADMIN — ATORVASTATIN CALCIUM 40 MG: 40 TABLET, FILM COATED ORAL at 17:13

## 2020-02-16 RX ADMIN — PANTOPRAZOLE SODIUM 40 MG: 40 TABLET, DELAYED RELEASE ORAL at 05:20

## 2020-02-16 RX ADMIN — AMLODIPINE BESYLATE 10 MG: 10 TABLET ORAL at 08:27

## 2020-02-16 RX ADMIN — LEVOTHYROXINE SODIUM 112 MCG: 112 TABLET ORAL at 05:20

## 2020-02-16 RX ADMIN — MELATONIN 3 MG: 3 TAB ORAL at 21:15

## 2020-02-16 RX ADMIN — POTASSIUM CHLORIDE 40 MEQ: 1500 TABLET, EXTENDED RELEASE ORAL at 08:27

## 2020-02-16 RX ADMIN — LEVETIRACETAM 500 MG: 500 TABLET, FILM COATED ORAL at 21:15

## 2020-02-16 RX ADMIN — SENNOSIDES AND DOCUSATE SODIUM 1 TABLET: 8.6; 5 TABLET ORAL at 08:27

## 2020-02-16 RX ADMIN — CHLORHEXIDINE GLUCONATE 0.12% ORAL RINSE 15 ML: 1.2 LIQUID ORAL at 21:19

## 2020-02-16 RX ADMIN — HEPARIN SODIUM 7500 UNITS: 5000 INJECTION INTRAVENOUS; SUBCUTANEOUS at 05:21

## 2020-02-16 RX ADMIN — CHLORHEXIDINE GLUCONATE 0.12% ORAL RINSE 15 ML: 1.2 LIQUID ORAL at 08:26

## 2020-02-16 RX ADMIN — LOSARTAN POTASSIUM 25 MG: 25 TABLET, FILM COATED ORAL at 08:27

## 2020-02-16 NOTE — ASSESSMENT & PLAN NOTE
· Follows with Dr Nicho Brennan as outpatient  · Per most recent outpatient nephrology office visit, suspect a new baseline creatinine 1 3  · Renal function at baseline  · Monitor

## 2020-02-16 NOTE — ASSESSMENT & PLAN NOTE
· Patient initially presented to the [facility] Emergency Department on 2/10/2020 with elevated blood pressure, headache, and nausea  CT of the head revealed acute intracerebral hemorrhage on the right temporal lobe with mild mass effect but no midline shift or herniation  CTA head and neck revealed no focal stenosis or saccular aneurysm within the [place], no hemodynamically significant stenosis within either common or internal carotid artery  She was transferred to [facility] ICU  On 02/11/2020 patient noted to have worsening neurologic status and a repeat CT head was performed which revealed increase in the intraparenchymal hematoma  Patient underwent urgent right decompressive craniectomy and evacuation of intraparenchymal hematoma by Dr Laurie Morris on 2/11/2020  CT head the following day revealed postoperative changes and decrease in mass effect on the right lateral ventricle, no midline shift or hydrocephalus  The patient remained intubated postoperatively  She was extubated on 2/13/2020  She was transferred out of the ICU on 02/14/2020  MRI on 2/15/2020 was degraded by motion artifact but showed drainage of large temporal hemorrhage, brought right frontoparietal craniectomy, expected postoperative changes with no large branch ischemic disease  MRV showed no evidence of dural venous sinus thrombosis    · Neurology and Neurosurgery recommendations appreciated   · On Keppra for seizure prophylaxis for 7 days total (today is day #6)  · Goal SBP per Neurosurgery <160  · PT/OT recommending rehab  · SLP cleared for regular diet  · Was started on DVT prophylactic SC heparin evening of 2/14/2020  · NGT from ICU removed 2/15/2020 as patient with increased PO intake   · Helmet when OOB

## 2020-02-16 NOTE — ASSESSMENT & PLAN NOTE
Wt Readings from Last 3 Encounters:   02/16/20 89 9 kg (198 lb 1 6 oz)   02/12/20 94 8 kg (208 lb 15 9 oz)   02/10/20 94 8 kg (208 lb 15 9 oz)     · Echo this admission revealed left ventricular ejection fraction of 65%  Abnormal left ventricular diastolic function  · On 2mg PO Bumex daily   Appears euvolemic

## 2020-02-16 NOTE — PROGRESS NOTES
Progress Note - Davonte Castillo 1940, [de-identified] y o  female MRN: [id number]    Unit/Bed#: [facility] 607-01 Encounter: [id number]    Primary Care Provider: Andriy Huang MD   Date and time admitted to hospital: 2/10/2020 10:18 PM        * Intraparenchymal hemorrhage of brain [facility])  Assessment & Plan  · Patient initially presented to the [facility] Emergency Department on 2/10/2020 with elevated blood pressure, headache, and nausea  CT of the head revealed acute intracerebral hemorrhage on the right temporal lobe with mild mass effect but no midline shift or herniation  CTA head and neck revealed no focal stenosis or saccular aneurysm within the [place], no hemodynamically significant stenosis within either common or internal carotid artery  She was transferred to [facility] ICU  On 02/11/2020 patient noted to have worsening neurologic status and a repeat CT head was performed which revealed increase in the intraparenchymal hematoma  Patient underwent urgent right decompressive craniectomy and evacuation of intraparenchymal hematoma by Dr Manav Finnegan on 2/11/2020  CT head the following day revealed postoperative changes and decrease in mass effect on the right lateral ventricle, no midline shift or hydrocephalus  The patient remained intubated postoperatively  She was extubated on 2/13/2020  She was transferred out of the ICU on 02/14/2020  MRI on 2/15/2020 was degraded by motion artifact but showed drainage of large temporal hemorrhage, brought right frontoparietal craniectomy, expected postoperative changes with no large branch ischemic disease  MRV showed no evidence of dural venous sinus thrombosis    · Neurology and Neurosurgery recommendations appreciated   · On Keppra for seizure prophylaxis for 7 days total (today is day #6)  · Goal SBP per Neurosurgery <160  · PT/OT recommending rehab  · SLP cleared for regular diet  · Was started on DVT prophylactic SC heparin evening of 2/14/2020  · NGT from ICU removed 2/15/2020 as patient with increased PO intake   · Helmet when OOB    Benign essential hypertension  Assessment & Plan  · Goal SBP per neurosurgery <160  · Currently on 10 mg amlodipine daily, 2 mg Bumex p o  Daily, 25 mg losartan daily  · Blood pressure at goal    Chronic diastolic (congestive) heart failure (HCC)  Assessment & Plan  Wt Readings from Last 3 Encounters:   02/16/20 89 9 kg (198 lb 1 6 oz)   02/12/20 94 8 kg (208 lb 15 9 oz)   02/10/20 94 8 kg (208 lb 15 9 oz)     · Echo this admission revealed left ventricular ejection fraction of 65%  Abnormal left ventricular diastolic function  · On 2mg PO Bumex daily  Appears euvolemic         CKD (chronic kidney disease), stage III (HCC)  Assessment & Plan  · Follows with Dr Mya Piña as outpatient  · Per most recent outpatient nephrology office visit, suspect a new baseline creatinine 1 3  · Renal function at baseline  · Monitor    Gastroesophageal reflux disease without esophagitis  Assessment & Plan  · Continue PPI    Hypothyroidism  Assessment & Plan  · Continue levothyroxine    Morbid obesity with body mass index of 40 0-44 9 in [facility])  Assessment & Plan  Body mass index is 37 43 kg/m²  · Nutrition consult    Hyperlipemia  Assessment & Plan  · LDL this admission 103  · Continue statin    Leukocytosis  Assessment & Plan  · Suspect steroid induced  · Monitor closely    Hypokalemia  Assessment & Plan  · Replete and monitor       VTE Pharmacologic Prophylaxis:   Pharmacologic: Heparin  Mechanical VTE Prophylaxis in Place: Yes    Patient Centered Rounds: I have performed bedside rounds with nursing staff today  Eliud Marin    Discussions with Specialists or Other Care Team Provider: nurse    Education and Discussions with Family / Patient: patient and her son, Ruben Youngblood, over the phone at the patient's request    Time Spent for Care: 20 minutes    More than 50% of total time spent on counseling and coordination of care as described above     Current Length of Stay: 6 day(s)    Current Patient Status: Inpatient   Certification Statement: The patient will continue to require additional inpatient hospital stay due to placement    Discharge Plan: to rehab    Code Status: Level 3 - DNAR and DNI      Subjective:   Ms Antonella Blanco reports she is feeling much better today  She reports a good appetite  Denies pain  Is looking forward to going home to see her son/DIL/grandson  Objective:     Vitals:   Temp (24hrs), Av 7 °F (37 1 °C), Min:97 7 °F (36 5 °C), Max:99 1 °F (37 3 °C)    Temp:  [97 7 °F (36 5 °C)-99 1 °F (37 3 °C)] 97 7 °F (36 5 °C)  HR:  [72-82] 74  Resp:  [17-18] 17  BP: (129-140)/(51-82) 132/82  SpO2:  [92 %-97 %] 96 %  Body mass index is 37 43 kg/m²  Input and Output Summary (last 24 hours): Intake/Output Summary (Last 24 hours) at 2020 0836  Last data filed at 2020 5314  Gross per 24 hour   Intake 880 ml   Output    Net 880 ml       Physical Exam:     Physical Exam   Constitutional: She is oriented to person, place, and time  No distress  Patient seen sitting upright comfortably resting in bedside chair with nurse present  Helmet on   Cardiovascular: Normal rate and regular rhythm  Pulmonary/Chest: Effort normal and breath sounds normal  No respiratory distress  She has no wheezes  Abdominal: Soft  Bowel sounds are normal  There is no tenderness  Musculoskeletal: She exhibits no edema  Neurological: She is alert and oriented to person, place, and time  [place] accent  Strength equal bilateral upper and lower extremities  Patient reports equal and intact sensation to light touch on bilateral upper and lower extremities   Skin: Skin is warm  She is not diaphoretic  Psychiatric: She has a normal mood and affect  Her behavior is normal    Vitals reviewed        Additional Data:     Labs:    Results from last 7 days   Lab Units 20  0500 02/15/20  0453   WBC Thousand/uL 12 06* 14 51*   HEMOGLOBIN g/dL 11 0* 11 0*   HEMATOCRIT % 34 9 36 1   PLATELETS Thousands/uL 188 200   NEUTROS PCT %  --  84*   LYMPHS PCT %  --  7*   MONOS PCT %  --  8   EOS PCT %  --  0     Results from last 7 days   Lab Units 02/16/20  0500  02/11/20  0806   SODIUM mmol/L 141   < > 141   POTASSIUM mmol/L 3 2*   < > 3 6   CHLORIDE mmol/L 107   < > 106   CO2 mmol/L 28   < > 31   CO2, I-STAT   --    < >  --    BUN mg/dL 21   < > 21   CREATININE mg/dL 0 93   < > 1 14   ANION GAP mmol/L 6   < > 4   CALCIUM mg/dL 8 2*   < > 9 1   ALBUMIN g/dL  --   --  3 1*   TOTAL BILIRUBIN mg/dL  --   --  0 49   ALK PHOS U/L  --   --  132*   ALT U/L  --   --  19   AST U/L  --   --  15   GLUCOSE RANDOM mg/dL 102   < > 128    < > = values in this interval not displayed  Results from last 7 days   Lab Units 02/10/20  2043   INR  1 13         Results from last 7 days   Lab Units 02/11/20  0604   HEMOGLOBIN A1C % 6 1               * I Have Reviewed All Lab Data Listed Above  * Additional Pertinent Lab Tests Reviewed:  All Labs Within Last 24 Hours Reviewed    Imaging:    Imaging Reports Reviewed Today Include: none  Imaging Personally Reviewed by Myself Includes:  none    Recent Cultures (last 7 days):           Last 24 Hours Medication List:     Current Facility-Administered Medications:  acetaminophen 650 mg Oral Q6H PRN Artemio Clemens MD   amLODIPine 10 mg Oral Daily Artemio Clemens MD   atorvastatin 40 mg Oral QPM Artemio Clemens MD   bisacodyl 10 mg Rectal Daily PRN Artemio Clemens MD   bumetanide 2 mg Oral Daily Artemio Clemens MD   chlorhexidine 15 mL Swish & Spit Q12H Dotty Bhagat MD   heparin (porcine) 7,500 Units Subcutaneous [facility] Artemio Clemens MD   hydrALAZINE 5 mg Intravenous Q6H PRN Artemio Clemens MD   Labetalol HCl 10 mg Intravenous Q4H PRN Artemio Clemens MD   levETIRAcetam 500 mg Oral Q12H Dotty Bhagat MD   levothyroxine 112 mcg Oral Early Morning Artemio Clemens MD   losartan 25 mg Oral Daily Artemio Clemens MD   melatonin 3 mg Oral HS Usama Alonzo MD   ondansetron 4 mg Intravenous Q4H PRN Usama Alonzo MD   oxyCODONE 2 5 mg Oral Q6H PRN Usama Alonzo MD   pantoprazole 40 mg Oral Early Morning Usama Alonzo MD   polyethylene glycol 17 g Oral Daily PRN Usama Alonzo MD   senna-docusate sodium 1 tablet Oral BID Usama Alonzo MD        Today, Patient Was Seen By: Mayda Angulo PA-C    ** Please Note: Dictation voice to text software may have been used in the creation of this document   **

## 2020-02-17 ENCOUNTER — TELEPHONE (OUTPATIENT)
Dept: NEUROSURGERY | Facility: CLINIC | Age: 80
End: 2020-02-17

## 2020-02-17 LAB
ANION GAP SERPL CALCULATED.3IONS-SCNC: 5 MMOL/L (ref 4–13)
BUN SERPL-MCNC: 17 MG/DL (ref 5–25)
CALCIUM SERPL-MCNC: 8.3 MG/DL (ref 8.3–10.1)
CHLORIDE SERPL-SCNC: 105 MMOL/L (ref 100–108)
CO2 SERPL-SCNC: 29 MMOL/L (ref 21–32)
CREAT SERPL-MCNC: 0.86 MG/DL (ref 0.6–1.3)
ERYTHROCYTE [DISTWIDTH] IN BLOOD BY AUTOMATED COUNT: 15.9 % (ref 11.6–15.1)
GFR SERPL CREATININE-BSD FRML MDRD: 64 ML/MIN/1.73SQ M
GLUCOSE SERPL-MCNC: 93 MG/DL (ref 65–140)
HCT VFR BLD AUTO: 34.5 % (ref 34.8–46.1)
HGB BLD-MCNC: 10.8 G/DL (ref 11.5–15.4)
MCH RBC QN AUTO: 27.1 PG (ref 26.8–34.3)
MCHC RBC AUTO-ENTMCNC: 31.3 G/DL (ref 31.4–37.4)
MCV RBC AUTO: 87 FL (ref 82–98)
PLATELET # BLD AUTO: 172 THOUSANDS/UL (ref 149–390)
PMV BLD AUTO: 11.7 FL (ref 8.9–12.7)
POTASSIUM SERPL-SCNC: 3.1 MMOL/L (ref 3.5–5.3)
RBC # BLD AUTO: 3.98 MILLION/UL (ref 3.81–5.12)
SODIUM SERPL-SCNC: 139 MMOL/L (ref 136–145)
WBC # BLD AUTO: 10.74 THOUSAND/UL (ref 4.31–10.16)

## 2020-02-17 PROCEDURE — 85027 COMPLETE CBC AUTOMATED: CPT | Performed by: PHYSICIAN ASSISTANT

## 2020-02-17 PROCEDURE — 99223 1ST HOSP IP/OBS HIGH 75: CPT | Performed by: PHYSICAL MEDICINE & REHABILITATION

## 2020-02-17 PROCEDURE — 97116 GAIT TRAINING THERAPY: CPT

## 2020-02-17 PROCEDURE — 97535 SELF CARE MNGMENT TRAINING: CPT

## 2020-02-17 PROCEDURE — 99232 SBSQ HOSP IP/OBS MODERATE 35: CPT | Performed by: NURSE PRACTITIONER

## 2020-02-17 PROCEDURE — 97530 THERAPEUTIC ACTIVITIES: CPT

## 2020-02-17 PROCEDURE — 80048 BASIC METABOLIC PNL TOTAL CA: CPT | Performed by: PHYSICIAN ASSISTANT

## 2020-02-17 RX ORDER — POTASSIUM CHLORIDE 20 MEQ/1
40 TABLET, EXTENDED RELEASE ORAL ONCE
Status: COMPLETED | OUTPATIENT
Start: 2020-02-17 | End: 2020-02-17

## 2020-02-17 RX ADMIN — HEPARIN SODIUM 7500 UNITS: 5000 INJECTION INTRAVENOUS; SUBCUTANEOUS at 14:08

## 2020-02-17 RX ADMIN — CHLORHEXIDINE GLUCONATE 0.12% ORAL RINSE 15 ML: 1.2 LIQUID ORAL at 09:56

## 2020-02-17 RX ADMIN — AMLODIPINE BESYLATE 10 MG: 10 TABLET ORAL at 09:56

## 2020-02-17 RX ADMIN — ATORVASTATIN CALCIUM 40 MG: 40 TABLET, FILM COATED ORAL at 18:25

## 2020-02-17 RX ADMIN — MELATONIN 3 MG: 3 TAB ORAL at 21:00

## 2020-02-17 RX ADMIN — SENNOSIDES AND DOCUSATE SODIUM 1 TABLET: 8.6; 5 TABLET ORAL at 18:25

## 2020-02-17 RX ADMIN — SENNOSIDES AND DOCUSATE SODIUM 1 TABLET: 8.6; 5 TABLET ORAL at 09:56

## 2020-02-17 RX ADMIN — LEVETIRACETAM 500 MG: 500 TABLET, FILM COATED ORAL at 21:00

## 2020-02-17 RX ADMIN — HEPARIN SODIUM 7500 UNITS: 5000 INJECTION INTRAVENOUS; SUBCUTANEOUS at 21:00

## 2020-02-17 RX ADMIN — PANTOPRAZOLE SODIUM 40 MG: 40 TABLET, DELAYED RELEASE ORAL at 05:06

## 2020-02-17 RX ADMIN — LEVOTHYROXINE SODIUM 112 MCG: 112 TABLET ORAL at 05:06

## 2020-02-17 RX ADMIN — BUMETANIDE 2 MG: 2 TABLET ORAL at 09:56

## 2020-02-17 RX ADMIN — POTASSIUM CHLORIDE 40 MEQ: 1500 TABLET, EXTENDED RELEASE ORAL at 14:08

## 2020-02-17 RX ADMIN — CHLORHEXIDINE GLUCONATE 0.12% ORAL RINSE 15 ML: 1.2 LIQUID ORAL at 21:00

## 2020-02-17 RX ADMIN — LOSARTAN POTASSIUM 25 MG: 25 TABLET, FILM COATED ORAL at 09:56

## 2020-02-17 RX ADMIN — HEPARIN SODIUM 7500 UNITS: 5000 INJECTION INTRAVENOUS; SUBCUTANEOUS at 05:07

## 2020-02-17 RX ADMIN — LEVETIRACETAM 500 MG: 500 TABLET, FILM COATED ORAL at 09:56

## 2020-02-17 NOTE — SOCIAL WORK
TC from [facility] at [address] [facility] they reviewed updated PT/OT notes  Pt is accepted & will go to room 970 tomorrow with report to x [phone]  They will f/u with CM in am for time

## 2020-02-17 NOTE — PLAN OF CARE
Problem: Potential for Falls  Goal: Patient will remain free of falls  Description  INTERVENTIONS:  - Assess patient frequently for physical needs  -  Identify cognitive and physical deficits and behaviors that affect risk of falls    -  [place] fall precautions as indicated by assessment   - Educate patient/family on patient safety including physical limitations  - Instruct patient to call for assistance with activity based on assessment  - Modify environment to reduce risk of injury  - Consider OT/PT consult to assist with strengthening/mobility  Outcome: Progressing     Problem: Prexisting or High Potential for Compromised Skin Integrity  Goal: Skin integrity is maintained or improved  Description  INTERVENTIONS:  - Identify patients at risk for skin breakdown  - Assess and monitor skin integrity  - Assess and monitor nutrition and hydration status  - Monitor labs   - Assess for incontinence   - Turn and reposition patient  - Assist with mobility/ambulation  - Relieve pressure over bony prominences  - Avoid friction and shearing  - Provide appropriate hygiene as needed including keeping skin clean and dry  - Evaluate need for skin moisturizer/barrier cream  - Collaborate with interdisciplinary team   - Patient/family teaching  - Consider wound care consult   Outcome: Progressing     Problem: NEUROSENSORY - ADULT  Goal: Achieves stable or improved neurological status  Description  INTERVENTIONS  - Monitor and report changes in neurological status  - Monitor vital signs such as temperature, blood pressure, glucose, and any other labs ordered   - Initiate measures to prevent increased intracranial pressure  - Monitor for seizure activity and implement precautions if appropriate      Outcome: Progressing  Goal: Remains free of injury related to seizures activity  Description  INTERVENTIONS  - Maintain airway, patient safety  and administer oxygen as ordered  - Monitor patient for seizure activity, document and report duration and description of seizure to physician/advanced practitioner  - If seizure occurs,  ensure patient safety during seizure  - Reorient patient post seizure  - Seizure pads on all 4 side rails  - Instruct patient/family to notify RN of any seizure activity including if an aura is experienced  - Instruct patient/family to call for assistance with activity based on nursing assessment  - Administer anti-seizure medications if ordered    Outcome: Progressing  Goal: Achieves maximal functionality and self care  Description  INTERVENTIONS  - Monitor swallowing and airway patency with patient fatigue and changes in neurological status  - Encourage and assist patient to increase activity and self care     - Encourage visually impaired, hearing impaired and aphasic patients to use assistive/communication devices  Outcome: Progressing     Problem: CARDIOVASCULAR - ADULT  Goal: Maintains optimal cardiac output and hemodynamic stability  Description  INTERVENTIONS:  - Monitor I/O, vital signs and rhythm  - Monitor for S/S and trends of decreased cardiac output  - Administer and titrate ordered vasoactive medications to optimize hemodynamic stability  - Assess quality of pulses, skin color and temperature  - Assess for signs of decreased coronary artery perfusion  - Instruct patient to report change in severity of symptoms  Outcome: Progressing  Goal: Absence of cardiac dysrhythmias or at baseline rhythm  Description  INTERVENTIONS:  - Continuous cardiac monitoring, vital signs, obtain 12 lead EKG if ordered  - Administer antiarrhythmic and heart rate control medications as ordered  - Monitor electrolytes and administer replacement therapy as ordered  Outcome: Progressing     Problem: RESPIRATORY - ADULT  Goal: Achieves optimal ventilation and oxygenation  Description  INTERVENTIONS:  - Assess for changes in respiratory status  - Assess for changes in mentation and behavior  - Position to facilitate oxygenation and minimize respiratory effort  - Oxygen administered by appropriate delivery if ordered  - Initiate smoking cessation education as indicated  - Encourage broncho-pulmonary hygiene including cough, deep breathe, Incentive Spirometry  - Assess the need for suctioning and aspirate as needed  - Assess and instruct to report SOB or any respiratory difficulty  - Respiratory Therapy support as indicated  Outcome: Progressing     Problem: GASTROINTESTINAL - ADULT  Goal: Minimal or absence of nausea and/or vomiting  Description  INTERVENTIONS:  - Administer IV fluids if ordered to ensure adequate hydration  - Maintain NPO status until nausea and vomiting are resolved  - Nasogastric tube if ordered  - Administer ordered antiemetic medications as needed  - Provide nonpharmacologic comfort measures as appropriate  - Advance diet as tolerated, if ordered  - Consider nutrition services referral to assist patient with adequate nutrition and appropriate food choices  Outcome: Progressing  Goal: Maintains or returns to baseline bowel function  Description  INTERVENTIONS:  - Assess bowel function  - Encourage oral fluids to ensure adequate hydration  - Administer IV fluids if ordered to ensure adequate hydration  - Administer ordered medications as needed  - Encourage mobilization and activity  - Consider nutritional services referral to assist patient with adequate nutrition and appropriate food choices  Outcome: Progressing  Goal: Maintains adequate nutritional intake  Description  INTERVENTIONS:  - Monitor percentage of each meal consumed  - Identify factors contributing to decreased intake, treat as appropriate  - Assist with meals as needed  - Monitor I&O, weight, and lab values if indicated  - Obtain nutrition services referral as needed  Outcome: Progressing     Problem: GENITOURINARY - ADULT  Goal: Maintains or returns to baseline urinary function  Description  INTERVENTIONS:  - Assess urinary function  - Encourage oral fluids to ensure adequate hydration if ordered  - Administer IV fluids as ordered to ensure adequate hydration  - Administer ordered medications as needed  - Offer frequent toileting  - Follow urinary retention protocol if ordered  Outcome: Progressing     Problem: METABOLIC, FLUID AND ELECTROLYTES - ADULT  Goal: Electrolytes maintained within normal limits  Description  INTERVENTIONS:  - Monitor labs and assess patient for signs and symptoms of electrolyte imbalances  - Administer electrolyte replacement as ordered  - Monitor response to electrolyte replacements, including repeat lab results as appropriate  - Instruct patient on fluid and nutrition as appropriate  Outcome: Progressing  Goal: Fluid balance maintained  Description  INTERVENTIONS:  - Monitor labs   - Monitor I/O and WT  - Instruct patient on fluid and nutrition as appropriate  - Assess for signs & symptoms of volume excess or deficit  Outcome: Progressing  Goal: Glucose maintained within target range  Description  INTERVENTIONS:  - Monitor Blood Glucose as ordered  - Assess for signs and symptoms of hyperglycemia and hypoglycemia  - Administer ordered medications to maintain glucose within target range  - Assess nutritional intake and initiate nutrition service referral as needed  Outcome: Progressing     Problem: SKIN/TISSUE INTEGRITY - ADULT  Goal: Skin integrity remains intact  Description  INTERVENTIONS  - Identify patients at risk for skin breakdown  - Assess and monitor skin integrity  - Assess and monitor nutrition and hydration status  - Monitor labs (i e  albumin)  - Assess for incontinence   - Turn and reposition patient  - Assist with mobility/ambulation  - Relieve pressure over bony prominences  - Avoid friction and shearing  - Provide appropriate hygiene as needed including keeping skin clean and dry  - Evaluate need for skin moisturizer/barrier cream  - Collaborate with interdisciplinary team (i e  Nutrition, Rehabilitation, etc )   - Patient/family teaching  Outcome: Progressing     Problem: HEMATOLOGIC - ADULT  Goal: Maintains hematologic stability  Description  INTERVENTIONS  - Assess for signs and symptoms of bleeding or hemorrhage  - Monitor labs  - Administer supportive blood products/factors as ordered and appropriate  Outcome: Progressing     Problem: MUSCULOSKELETAL - ADULT  Goal: Maintain or return mobility to safest level of function  Description  INTERVENTIONS:  - Assess patient's ability to carry out ADLs; assess patient's baseline for ADL function and identify physical deficits which impact ability to perform ADLs (bathing, care of mouth/teeth, toileting, grooming, dressing, etc )  - Assess/evaluate cause of self-care deficits   - Assess range of motion  - Assess patient's mobility  - Assess patient's need for assistive devices and provide as appropriate  - Encourage maximum independence but intervene and supervise when necessary  - Involve family in performance of ADLs  - Assess for home care needs following discharge   - Consider OT consult to assist with ADL evaluation and planning for discharge  - Provide patient education as appropriate  Outcome: Progressing     Problem: Neurological Deficit  Goal: Neurological status is stable or improving  Description  Interventions:  - Monitor and assess patient's level of consciousness, motor function, sensory function, and level of assistance needed for ADLs  - Monitor and report changes from baseline  Collaborate with interdisciplinary team to initiate plan and implement interventions as ordered  - Provide and maintain a safe environment  - Consider seizure precautions  - Consider fall precautions  - Consider aspiration precautions  - Consider bleeding precautions  Outcome: Progressing     Problem:  Activity Intolerance/Impaired Mobility  Goal: Mobility/activity is maintained at optimum level for patient  Description  Interventions:  - Assess and monitor patient  barriers to mobility and need for assistive/adaptive devices  - Assess patient's emotional response to limitations  - Collaborate with interdisciplinary team and initiate plans and interventions as ordered  - Encourage independent activity per ability   - Maintain proper body alignment  - Perform active/passive rom as tolerated/ordered  - Plan activities to conserve energy   - Turn patient as appropriate  Outcome: Progressing     Problem: Communication Impairment  Goal: Ability to express needs and understand communication  Description  Assess patient's communication skills and ability to understand information  Patient will demonstrate use of effective communication techniques, alternative methods of communication and understanding even if not able to speak  - Encourage communication and provide alternate methods of communication as needed  - Collaborate with case management/ for discharge needs  - Include patient/family/caregiver in decisions related to communication  Outcome: Progressing     Problem: Potential for Aspiration  Goal: Non-ventilated patient's risk of aspiration is minimized  Description  Assess and monitor vital signs, respiratory status, and labs (WBC)  Monitor for signs of aspiration (tachypnea, cough, rales, wheezing, cyanosis, fever)  - Assess and monitor patient's ability to swallow  - Place patient up in chair to eat if possible  - HOB up at 90 degrees to eat if unable to get patient up into chair   - Supervise patient during oral intake  - Instruct patient/ family to take small bites  - Instruct patient/ family to take small single sips when taking liquids  - Follow patient-specific strategies generated by speech pathologist   Outcome: Progressing  Goal: Ventilated patient's risk of aspiration is minimized  Description  Assess and monitor vital signs, respiratory status, airway cuff pressure, and labs (WBC)    Monitor for signs of aspiration (tachypnea, cough, rales, wheezing, cyanosis, fever)  - Elevate head of bed 30 degrees if patient has tube feeding   - Monitor tube feeding  Outcome: Progressing     Problem: Nutrition  Goal: Nutrition/Hydration status is improving  Description  Monitor and assess patient's nutrition/hydration status for malnutrition (ex- brittle hair, bruises, dry skin, pale skin and conjunctiva, muscle wasting, smooth red tongue, and disorientation)  Collaborate with interdisciplinary team and initiate plan and interventions as ordered  Monitor patient's weight and dietary intake as ordered or per policy  Utilize nutrition screening tool and intervene per policy  Determine patient's food preferences and provide high-protein, high-caloric foods as appropriate  - Assist patient with eating   - Allow adequate time for meals   - Encourage patient to take dietary supplement as ordered  - Collaborate with clinical nutritionist   - Include patient/family/caregiver in decisions related to nutrition  Outcome: Progressing     Problem: Nutrition/Hydration-ADULT  Goal: Nutrient/Hydration intake appropriate for improving, restoring or maintaining nutritional needs  Description  Monitor and assess patient's nutrition/hydration status for malnutrition  Collaborate with interdisciplinary team and initiate plan and interventions as ordered  Monitor patient's weight and dietary intake as ordered or per policy  Utilize nutrition screening tool and intervene as necessary  Determine patient's food preferences and provide high-protein, high-caloric foods as appropriate       INTERVENTIONS:  - Monitor oral intake, urinary output, labs, and treatment plans  - Assess nutrition and hydration status and recommend course of action  - Evaluate amount of meals eaten  - Assist patient with eating if necessary   - Allow adequate time for meals  - Recommend/ encourage appropriate diets, oral nutritional supplements, and vitamin/mineral supplements  - Order, calculate, and assess calorie counts as needed  - Recommend, monitor, and adjust tube feedings and TPN/PPN based on assessed needs  - Assess need for intravenous fluids  - Provide specific nutrition/hydration education as appropriate  - Include patient/family/caregiver in decisions related to nutrition  Outcome: Progressing     Problem: PAIN - ADULT  Goal: Verbalizes/displays adequate comfort level or baseline comfort level  Description  Interventions:  - Encourage patient to monitor pain and request assistance  - Assess pain using appropriate pain scale  - Administer analgesics based on type and severity of pain and evaluate response  - Implement non-pharmacological measures as appropriate and evaluate response  - Consider cultural and social influences on pain and pain management  - Notify physician/advanced practitioner if interventions unsuccessful or patient reports new pain  Outcome: Progressing     Problem: INFECTION - ADULT  Goal: Absence or prevention of progression during hospitalization  Description  INTERVENTIONS:  - Assess and monitor for signs and symptoms of infection  - Monitor lab/diagnostic results  - Monitor all insertion sites, i e  indwelling lines, tubes, and drains  - Monitor endotracheal if appropriate and nasal secretions for changes in amount and color  - [place] appropriate cooling/warming therapies per order  - Administer medications as ordered  - Instruct and encourage patient and family to use good hand hygiene technique  - Identify and instruct in appropriate isolation precautions for identified infection/condition  Outcome: Progressing     Problem: SAFETY ADULT  Goal: Maintain or return to baseline ADL function  Description  INTERVENTIONS:  -  Assess patient's ability to carry out ADLs; assess patient's baseline for ADL function and identify physical deficits which impact ability to perform ADLs (bathing, care of mouth/teeth, toileting, grooming, dressing, etc )  - Assess/evaluate cause of self-care deficits   - Assess range of motion  - Assess patient's mobility; develop plan if impaired  - Assess patient's need for assistive devices and provide as appropriate  - Encourage maximum independence but intervene and supervise when necessary  - Involve family in performance of ADLs  - Assess for home care needs following discharge   - Consider OT consult to assist with ADL evaluation and planning for discharge  - Provide patient education as appropriate  Outcome: Progressing  Goal: Maintain or return mobility status to optimal level  Description  INTERVENTIONS:  - Assess patient's baseline mobility status (ambulation, transfers, stairs, etc )    - Identify cognitive and physical deficits and behaviors that affect mobility  - Identify mobility aids required to assist with transfers and/or ambulation (gait belt, sit-to-stand, lift, walker, cane, etc )  - [place] fall precautions as indicated by assessment  - Record patient progress and toleration of activity level on Mobility SBAR; progress patient to next Phase/Stage  - Instruct patient to call for assistance with activity based on assessment  - Consider rehabilitation consult to assist with strengthening/weightbearing, etc   Outcome: Progressing     Problem: DISCHARGE PLANNING  Goal: Discharge to home or other facility with appropriate resources  Description  INTERVENTIONS:  - Identify barriers to discharge w/patient and caregiver  - Arrange for needed discharge resources and transportation as appropriate  - Identify discharge learning needs (meds, wound care, etc )  - Arrange for interpretive services to assist at discharge as needed  - Refer to Case Management Department for coordinating discharge planning if the patient needs post-hospital services based on physician/advanced practitioner order or complex needs related to functional status, cognitive ability, or social support system  Outcome: Progressing     Problem: Knowledge Deficit  Goal: Patient/family/caregiver demonstrates understanding of disease process, treatment plan, medications, and discharge instructions  Description  Complete learning assessment and assess knowledge base    Interventions:  - Provide teaching at level of understanding  - Provide teaching via preferred learning methods  Outcome: Progressing

## 2020-02-17 NOTE — PLAN OF CARE
Problem: PHYSICAL THERAPY ADULT  Goal: Performs mobility at highest level of function for planned discharge setting  See evaluation for individualized goals  Outcome: Progressing  Note:   Prognosis: Good  Problem List: Decreased strength, Decreased endurance, Impaired balance, Decreased mobility  Assessment: Pt presents with decreased mobility, strength, balance, and activity tolerance  Pt demonstrated ability to perform STS functional transfers at supervision  Pt ambulated 250ft, 100ft with RW at [address]  Pt requires Min A for RW management and balance 2' pt easily distracted and not paying attention forward when ambulating  Pt requires VCs for safety throughout session  Pt continues to benefit from skilled therapy to maximize functional independence  Recommendation at this time is rehab  Pt would benefit from continued ambulation, functional transfers, strength, balance, stairs, and activity tolerance  Barriers to Discharge: Inaccessible home environment     Recommendation: Post acute IP rehab     PT - OK to Discharge: Yes    See flowsheet documentation for full assessment

## 2020-02-17 NOTE — TELEPHONE ENCOUNTER
03/04/2020-PLEASE SEE BLAKE'S 03/03/2020 TELEPHONE NOTE  CT SCHEDULED ON 03/06/2020  MEDICARE/AARP    02/28/2020-PT STILL IN HOSPITAL    02/27/2020-PT STILL IN HOSPITAL    02/26/2020-PT STILL IN HOSPITAL  03/24/2020-6 [address] POV W/[facility]H      02/25/2020-PT STILL [place] CT ORDERED IN PT/PA'S ARE AWARE AND SEEING [address]      02/24/2020-PT STILL IN HOSPITAL    02/21/2020-PT STILL IN HOSPITAL    02/20/2020-PT STILL IN HOSPITAL    02/19/2020-PT STILL IN HOSPITAL    02/18/2020-PT STILL IN HOSPITAL    02/17/2020-PT STILL IN HOSPITAL  2/25/2020-2WK POV  03/24/2020-6WK POV  CONFIRMED W/[id number]-2WK W/PA NEED CT HEAD  MEDICARE/AARP      ----- Message from Dyan Thornton PA-C sent at 2/15/2020  7:45 AM EST -----  Regarding: post-op follow up   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

## 2020-02-17 NOTE — OCCUPATIONAL THERAPY NOTE
Occupational Therapy Treatment Note:     02/17/20 1441   Restrictions/Precautions   Weight Bearing Precautions Per Order No   Braces or Orthoses Craniectomy Helmet   Other Precautions Cognitive; Impulsive; Fall Risk   Pain Assessment   Pain Assessment No/denies pain   Pain Score No Pain   ADL   Where Assessed Standing at sink   Grooming Assistance 4  Minimal Assistance   Grooming Deficit Verbal cueing  (cues for thoroughness)   UB Bathing Assistance 4  Minimal Assistance   UB Bathing Deficit Verbal cueing  (cues for thoroughness)   LB Bathing Assistance 4  Minimal Assistance   LB Bathing Deficit Steadying  (wash feet)   UB Dressing Assistance 5  Supervision/Setup   UB Dressing Deficit Setup   LB Dressing Assistance 3  Moderate Assistance   LB Dressing Deficit Thread LLE into underwear;Requires assistive device for steadying  (don socks)   Toileting Assistance  3  Moderate Assistance   Toileting Comments clothing management  f balance in stance c rw  Transfers   Sit to Stand 4  Minimal assistance   Additional items Verbal cues;Armrests   Stand to Sit 4  Minimal assistance   Additional items Verbal cues;Armrests   Additional Comments transfers c rw  cues for safe hand placement  pt began and ended session seated in recliner  Functional Mobility   Functional Mobility 5  Supervision   Additional Comments several steps to bathroom   Additional items Rolling walker   Cognition   Overall Cognitive Status [facility]   Arousal/Participation Alert; Cooperative   Attention Attends with cues to redirect   Orientation Level Oriented X4   Memory Within functional limits   Following Commands Follows multistep commands without difficulty   Comments pt is pleasant and cooperative  pt is very talkative which leads her to require cues to redirect to task      Activity Tolerance   Activity Tolerance Patient tolerated treatment well   Assessment   Assessment pt was seen for pm ot session focusing on self care, transfers, and functional mobility  required overall min a for thoroughness with ub adls  overall mod a for lb adls  required overall min a for transfers  cues for safe hand placement c rw  pt is very talkative and required repeated redirection to task  pt is limited by reported carpal tunnel in both wrists  pt reports use of lhae for lb bathing at home  Plan   Treatment Interventions ADL retraining;Functional transfer training;UE strengthening/ROM; Endurance training;Cognitive reorientation;Patient/family training;Equipment evaluation/education; Compensatory technique education; Energy conservation; Activityengagement   Goal Expiration Date 02/28/20   OT Treatment Day 1   OT Frequency 3-5x/wk   Recommendation   OT Discharge Recommendation Short Term Rehab   OT - OK to Discharge Yes   Lisa Bates

## 2020-02-17 NOTE — SOCIAL WORK
TC to pt's son Jason Wong (works at [employer]) (C) [phone] to f/u with rehab choices  He is requesting [facility], [address] , [facility] & [address]  PM&R consult entered & tbd  S/w pt whom is in agreement  Has list at bedside for other options  A post acute care recommendation was made by your care team for STR  Discussed Freedom of Choice with both patient and caregiver  List of facilities given to both patient and caregiver via in person  both patient and caregiver aware the list is custom filtered for them by preference  and that [facility] post acute providers are designated  Pt discussed with SLIM for care coordination  Await for medical updates for dc planning

## 2020-02-17 NOTE — SOCIAL WORK
TC from [facility] admissions & they can accept pt & will have bed available tomorrow  They are asking for updated therapy notes today  Notified PT/OT whom will provide  Informed Lashay Sainz with SLIM & pt medically cleared  Can dc tomorrow  Updated pt & marina booker Dewey & both in agreement to accept [facility] bed

## 2020-02-17 NOTE — PHYSICAL THERAPY NOTE
PHYSICAL THERAPY NOTE    Patient Name: Elke Durham  FTHOO'U Date: 2/17/2020 02/17/20 1341   Pain Assessment   Pain Assessment No/denies pain   Pain Score No Pain   Restrictions/Precautions   Weight Bearing Precautions Per Order No   Braces or Orthoses Craniectomy Helmet   Other Precautions Cognitive; Fall Risk   General   Chart Reviewed Yes   Response to Previous Treatment Patient with no complaints from previous session  Family/Caregiver Present No   Cognition   Arousal/Participation Responsive; Cooperative   Attention Attends with cues to redirect   Orientation Level Oriented X4   Memory Within functional limits   Following Commands Follows all commands and directions without difficulty   Subjective   Subjective Pt seated OOB in chair and agreeable to work w/ therapy   Bed Mobility   Supine to Sit Unable to assess   Sit to Supine Unable to assess   Additional Comments Pt seated OOB upon PT arrival and returned seated OOB at end of session w/ all needs within reach   Transfers   Sit to Stand 5  Supervision   Additional items Assist x 1; Increased time required;Verbal cues   Stand to Sit 5  Supervision   Additional items Assist x 1; Increased time required;Verbal cues   Additional Comments Transfers w/ RW; VCs for safe hand placement   Ambulation/Elevation   Gait pattern Excessively slow; Short stride; Shuffling; Inconsistent iris   Gait Assistance 4  Minimal assist   Additional items Assist x 1;Verbal cues   Assistive Device Rolling walker   Distance 250ft, 100ft  (1 seated rest break)   Stair Management Assistance Not tested   Balance   Static Sitting Fair +   Dynamic Sitting Fair   Static Standing Fair -   Dynamic Standing Poor +   Ambulatory Poor +   Endurance Deficit   Endurance Deficit Yes   Endurance Deficit Description weakness, fatigue   Activity Tolerance   Activity Tolerance Patient tolerated treatment well   Medical Staff Made Aware Rehab Apolonia Cabrera   Nurse Made Aware RN cleared pt for therapy   Exercises   Balance training  dynamic tasks w/ UE w/ unilateral UE support on RW   Assessment   Prognosis Good   Problem List Decreased strength;Decreased endurance; Impaired balance;Decreased mobility   Assessment Pt presents with decreased mobility, strength, balance, and activity tolerance  Pt demonstrated ability to perform STS functional transfers at supervision  Pt ambulated 250ft, 100ft with RW at [address]  Pt requires Min A for RW management and balance 2' pt easily distracted and not paying attention forward when ambulating  Pt requires VCs for safety throughout session  Pt continues to benefit from skilled therapy to maximize functional independence  Recommendation at this time is rehab  Pt would benefit from continued ambulation, functional transfers, strength, balance, stairs, and activity tolerance   Goals   Patient Goals To go to rehab   STG Expiration Date 02/28/20   PT Treatment Day 1   Plan   Treatment/Interventions Functional transfer training;LE strengthening/ROM; Endurance training;Patient/family training;Equipment eval/education;Gait training;Spoke to nursing   Progress Progressing toward goals   PT Frequency   (3-5x/week)   Recommendation   Recommendation Post acute IP rehab   Equipment Recommended Walker   PT - OK to Discharge Yes   Additional Comments when medically cleared to rehab     Annette Abreu, PT, DPT

## 2020-02-17 NOTE — ASSESSMENT & PLAN NOTE
· Follows with Dr Shira Matthew as outpatient  · Per most recent outpatient nephrology office visit, suspect a new baseline creatinine 1 3  · Renal function at baseline  · Monitor

## 2020-02-17 NOTE — ASSESSMENT & PLAN NOTE
· Patient initially presented to the [facility] Emergency Department on 2/10/2020 with elevated blood pressure, headache, and nausea  CT of the head revealed acute intracerebral hemorrhage on the right temporal lobe with mild mass effect but no midline shift or herniation  CTA head and neck revealed no focal stenosis or saccular aneurysm within the [place], no hemodynamically significant stenosis within either common or internal carotid artery  She was transferred to [facility] ICU  · On 02/11/2020 patient noted to have worsening neurologic status and a repeat CT head was performed which revealed increase in the intraparenchymal hematoma  ·  Patient underwent urgent right decompressive craniectomy and evacuation of intraparenchymal hematoma by Dr Ashley Monroy on 2/11/2020  ·  CT head the following day revealed postoperative changes and decrease in mass effect on the right lateral ventricle, no midline shift or hydrocephalus  The patient remained intubated postoperatively  She was extubated on 2/13/2020  She was transferred out of the ICU on 02/14/2020  · MRI on 2/15/2020 was degraded by motion artifact but showed drainage of large temporal hemorrhage, brought right frontoparietal craniectomy, expected postoperative changes with no large branch ischemic disease  MRV showed no evidence of dural venous sinus thrombosis    · Neurology and Neurosurgery recommendations appreciated   · On Keppra for seizure prophylaxis for 7 days total (today is day #7)  · Goal SBP per Neurosurgery <160  · PT/OT recommending rehab   · SLP cleared for regular diet  · Was started on DVT prophylactic SC heparin evening of 2/14/2020  · NGT from ICU removed 2/15/2020 as patient with increased PO intake   · Helmet when OOB

## 2020-02-17 NOTE — PLAN OF CARE
Problem: OCCUPATIONAL THERAPY ADULT  Goal: Performs self-care activities at highest level of function for planned discharge setting  See evaluation for individualized goals  Description  Treatment Interventions: ADL retraining, Functional transfer training, UE strengthening/ROM, Endurance training, Cognitive reorientation, Patient/family training, Compensatory technique education, Continued evaluation, Energy conservation, Activityengagement, Equipment evaluation/education          See flowsheet documentation for full assessment, interventions and recommendations  Outcome: Progressing  Note:   Limitation: Decreased ADL status, Decreased Safe judgement during ADL, Decreased UE strength, Decreased cognition, Decreased endurance, Decreased self-care trans, Decreased high-level ADLs  Prognosis: Fair  Assessment: pt was seen for pm ot session focusing on self care, transfers, and functional mobility  required overall min a for thoroughness with ub adls  overall mod a for lb adls  required overall min a for transfers  cues for safe hand placement c rw  pt is very talkative and required repeated redirection to task  pt is limited by reported carpal tunnel in both wrists  pt reports use of lhae for lb bathing at home        OT Discharge Recommendation: Short Term Rehab  OT - OK to Discharge: Yes  Blaze [place]

## 2020-02-18 ENCOUNTER — HOSPITAL ENCOUNTER (INPATIENT)
Facility: HOSPITAL | Age: 80
LOS: 11 days | Discharge: HOME | DRG: 056 | End: 2020-02-29
Attending: PHYSICAL MEDICINE & REHABILITATION | Admitting: PHYSICAL MEDICINE & REHABILITATION
Payer: MEDICARE

## 2020-02-18 VITALS
WEIGHT: 201.06 LBS | RESPIRATION RATE: 18 BRPM | HEIGHT: 61 IN | HEART RATE: 77 BPM | OXYGEN SATURATION: 95 % | BODY MASS INDEX: 37.96 KG/M2 | SYSTOLIC BLOOD PRESSURE: 126 MMHG | DIASTOLIC BLOOD PRESSURE: 49 MMHG | TEMPERATURE: 97.9 F

## 2020-02-18 DIAGNOSIS — N18.30 CKD (CHRONIC KIDNEY DISEASE), STAGE III (HCC): Chronic | ICD-10-CM

## 2020-02-18 DIAGNOSIS — I50.32 CHRONIC DIASTOLIC (CONGESTIVE) HEART FAILURE (HCC): Primary | ICD-10-CM

## 2020-02-18 DIAGNOSIS — I50.33 ACUTE ON CHRONIC DIASTOLIC CONGESTIVE HEART FAILURE (HCC): ICD-10-CM

## 2020-02-18 DIAGNOSIS — I61.9 INTRAPARENCHYMAL HEMORRHAGE OF BRAIN (HCC): ICD-10-CM

## 2020-02-18 DIAGNOSIS — Z74.09 IMPAIRED FUNCTIONAL MOBILITY, BALANCE, AND ENDURANCE: ICD-10-CM

## 2020-02-18 DIAGNOSIS — E78.5 HYPERLIPIDEMIA, UNSPECIFIED HYPERLIPIDEMIA TYPE: ICD-10-CM

## 2020-02-18 DIAGNOSIS — I10 BENIGN ESSENTIAL HYPERTENSION: Chronic | ICD-10-CM

## 2020-02-18 DIAGNOSIS — G47.33 OSA (OBSTRUCTIVE SLEEP APNEA): Chronic | ICD-10-CM

## 2020-02-18 PROBLEM — D72.829 LEUKOCYTOSIS: Status: RESOLVED | Noted: 2020-02-15 | Resolved: 2020-02-18

## 2020-02-18 PROBLEM — E87.6 HYPOKALEMIA: Status: RESOLVED | Noted: 2020-02-16 | Resolved: 2020-02-18

## 2020-02-18 LAB
ANION GAP SERPL CALCULATED.3IONS-SCNC: 6 MMOL/L (ref 4–13)
BASOPHILS # BLD MANUAL: 0.11 THOUSAND/UL (ref 0–0.1)
BASOPHILS NFR MAR MANUAL: 1 % (ref 0–1)
BUN SERPL-MCNC: 17 MG/DL (ref 5–25)
CALCIUM SERPL-MCNC: 8.5 MG/DL (ref 8.3–10.1)
CHLORIDE SERPL-SCNC: 105 MMOL/L (ref 100–108)
CO2 SERPL-SCNC: 28 MMOL/L (ref 21–32)
CREAT SERPL-MCNC: 1 MG/DL (ref 0.6–1.3)
EOSINOPHIL # BLD MANUAL: 0.22 THOUSAND/UL (ref 0–0.4)
EOSINOPHIL NFR BLD MANUAL: 2 % (ref 0–6)
ERYTHROCYTE [DISTWIDTH] IN BLOOD BY AUTOMATED COUNT: 15.7 % (ref 11.6–15.1)
GFR SERPL CREATININE-BSD FRML MDRD: 53 ML/MIN/1.73SQ M
GLUCOSE SERPL-MCNC: 87 MG/DL (ref 65–140)
HCT VFR BLD AUTO: 34.3 % (ref 34.8–46.1)
HGB BLD-MCNC: 10.9 G/DL (ref 11.5–15.4)
LYMPHOCYTES # BLD AUTO: 0.77 THOUSAND/UL (ref 0.6–4.47)
LYMPHOCYTES # BLD AUTO: 7 % (ref 14–44)
MCH RBC QN AUTO: 27.4 PG (ref 26.8–34.3)
MCHC RBC AUTO-ENTMCNC: 31.8 G/DL (ref 31.4–37.4)
MCV RBC AUTO: 86 FL (ref 82–98)
METAMYELOCYTES NFR BLD MANUAL: 1 % (ref 0–1)
MONOCYTES # BLD AUTO: 0.55 THOUSAND/UL (ref 0–1.22)
MONOCYTES NFR BLD: 5 % (ref 4–12)
MYELOCYTES NFR BLD MANUAL: 1 % (ref 0–1)
NEUTROPHILS # BLD MANUAL: 8.67 THOUSAND/UL (ref 1.85–7.62)
NEUTS SEG NFR BLD AUTO: 79 % (ref 43–75)
NRBC BLD AUTO-RTO: 0 /100 WBCS
PLATELET # BLD AUTO: 174 THOUSANDS/UL (ref 149–390)
PLATELET BLD QL SMEAR: ADEQUATE
PMV BLD AUTO: 11.7 FL (ref 8.9–12.7)
POTASSIUM SERPL-SCNC: 3.4 MMOL/L (ref 3.5–5.3)
RBC # BLD AUTO: 3.98 MILLION/UL (ref 3.81–5.12)
RBC MORPH BLD: NORMAL
SODIUM SERPL-SCNC: 139 MMOL/L (ref 136–145)
VARIANT LYMPHS # BLD AUTO: 4 %
WBC # BLD AUTO: 10.98 THOUSAND/UL (ref 4.31–10.16)

## 2020-02-18 PROCEDURE — 97530 THERAPEUTIC ACTIVITIES: CPT

## 2020-02-18 PROCEDURE — 85027 COMPLETE CBC AUTOMATED: CPT | Performed by: NURSE PRACTITIONER

## 2020-02-18 PROCEDURE — 99223 1ST HOSP IP/OBS HIGH 75: CPT | Performed by: PHYSICAL MEDICINE & REHABILITATION

## 2020-02-18 PROCEDURE — 80048 BASIC METABOLIC PNL TOTAL CA: CPT | Performed by: NURSE PRACTITIONER

## 2020-02-18 PROCEDURE — NC001 PR NO CHARGE

## 2020-02-18 PROCEDURE — 99239 HOSP IP/OBS DSCHRG MGMT >30: CPT | Performed by: INTERNAL MEDICINE

## 2020-02-18 PROCEDURE — 85007 BL SMEAR W/DIFF WBC COUNT: CPT | Performed by: NURSE PRACTITIONER

## 2020-02-18 PROCEDURE — 97116 GAIT TRAINING THERAPY: CPT

## 2020-02-18 RX ORDER — HEPARIN SODIUM 5000 [USP'U]/ML
7500 INJECTION, SOLUTION INTRAVENOUS; SUBCUTANEOUS EVERY 8 HOURS SCHEDULED
Status: DISCONTINUED | OUTPATIENT
Start: 2020-02-18 | End: 2020-02-18

## 2020-02-18 RX ORDER — LANOLIN ALCOHOL/MO/W.PET/CERES
3 CREAM (GRAM) TOPICAL
Status: CANCELLED | OUTPATIENT
Start: 2020-02-18

## 2020-02-18 RX ORDER — ONDANSETRON 2 MG/ML
4 INJECTION INTRAMUSCULAR; INTRAVENOUS EVERY 4 HOURS PRN
Status: CANCELLED | OUTPATIENT
Start: 2020-02-18

## 2020-02-18 RX ORDER — ATORVASTATIN CALCIUM 40 MG/1
40 TABLET, FILM COATED ORAL EVERY EVENING
Status: CANCELLED | OUTPATIENT
Start: 2020-02-18

## 2020-02-18 RX ORDER — LABETALOL 20 MG/4 ML (5 MG/ML) INTRAVENOUS SYRINGE
10 EVERY 4 HOURS PRN
Status: CANCELLED | OUTPATIENT
Start: 2020-02-18

## 2020-02-18 RX ORDER — HYDRALAZINE HYDROCHLORIDE 20 MG/ML
5 INJECTION INTRAMUSCULAR; INTRAVENOUS EVERY 6 HOURS PRN
Status: CANCELLED | OUTPATIENT
Start: 2020-02-18

## 2020-02-18 RX ORDER — LEVOTHYROXINE SODIUM 112 UG/1
112 TABLET ORAL
Status: CANCELLED | OUTPATIENT
Start: 2020-02-19

## 2020-02-18 RX ORDER — OXYCODONE HYDROCHLORIDE 5 MG/1
2.5 TABLET ORAL EVERY 6 HOURS PRN
Status: DISCONTINUED | OUTPATIENT
Start: 2020-02-18 | End: 2020-02-29 | Stop reason: HOSPADM

## 2020-02-18 RX ORDER — HEPARIN SODIUM 5000 [USP'U]/ML
5000 INJECTION, SOLUTION INTRAVENOUS; SUBCUTANEOUS EVERY 8 HOURS SCHEDULED
Status: DISCONTINUED | OUTPATIENT
Start: 2020-02-18 | End: 2020-02-29 | Stop reason: HOSPADM

## 2020-02-18 RX ORDER — ONDANSETRON 4 MG/1
4 TABLET, ORALLY DISINTEGRATING ORAL EVERY 6 HOURS PRN
Status: DISCONTINUED | OUTPATIENT
Start: 2020-02-18 | End: 2020-02-29 | Stop reason: HOSPADM

## 2020-02-18 RX ORDER — POLYETHYLENE GLYCOL 3350 17 G/17G
17 POWDER, FOR SOLUTION ORAL DAILY PRN
Status: DISCONTINUED | OUTPATIENT
Start: 2020-02-18 | End: 2020-02-29 | Stop reason: HOSPADM

## 2020-02-18 RX ORDER — ACETAMINOPHEN 325 MG/1
650 TABLET ORAL EVERY 6 HOURS PRN
Status: DISCONTINUED | OUTPATIENT
Start: 2020-02-18 | End: 2020-02-29 | Stop reason: HOSPADM

## 2020-02-18 RX ORDER — AMOXICILLIN 250 MG
1 CAPSULE ORAL 2 TIMES DAILY
Status: DISCONTINUED | OUTPATIENT
Start: 2020-02-18 | End: 2020-02-29 | Stop reason: HOSPADM

## 2020-02-18 RX ORDER — PANTOPRAZOLE SODIUM 40 MG/1
40 TABLET, DELAYED RELEASE ORAL
Status: DISCONTINUED | OUTPATIENT
Start: 2020-02-19 | End: 2020-02-29 | Stop reason: HOSPADM

## 2020-02-18 RX ORDER — BISACODYL 10 MG
10 SUPPOSITORY, RECTAL RECTAL DAILY PRN
Status: CANCELLED | OUTPATIENT
Start: 2020-02-18

## 2020-02-18 RX ORDER — BISACODYL 10 MG
10 SUPPOSITORY, RECTAL RECTAL DAILY PRN
Status: DISCONTINUED | OUTPATIENT
Start: 2020-02-18 | End: 2020-02-29 | Stop reason: HOSPADM

## 2020-02-18 RX ORDER — LOSARTAN POTASSIUM 25 MG/1
25 TABLET ORAL DAILY
Status: CANCELLED | OUTPATIENT
Start: 2020-02-19

## 2020-02-18 RX ORDER — BUMETANIDE 2 MG/1
2 TABLET ORAL DAILY
Status: DISCONTINUED | OUTPATIENT
Start: 2020-02-19 | End: 2020-02-29 | Stop reason: HOSPADM

## 2020-02-18 RX ORDER — LEVOTHYROXINE SODIUM 112 UG/1
112 TABLET ORAL
Status: DISCONTINUED | OUTPATIENT
Start: 2020-02-19 | End: 2020-02-29 | Stop reason: HOSPADM

## 2020-02-18 RX ORDER — ATORVASTATIN CALCIUM 40 MG/1
40 TABLET, FILM COATED ORAL EVERY EVENING
Status: DISCONTINUED | OUTPATIENT
Start: 2020-02-18 | End: 2020-02-29 | Stop reason: HOSPADM

## 2020-02-18 RX ORDER — CHLORHEXIDINE GLUCONATE 0.12 MG/ML
15 RINSE ORAL EVERY 12 HOURS SCHEDULED
Status: CANCELLED | OUTPATIENT
Start: 2020-02-18

## 2020-02-18 RX ORDER — POTASSIUM CHLORIDE 20 MEQ/1
40 TABLET, EXTENDED RELEASE ORAL EVERY 4 HOURS
Status: CANCELLED | OUTPATIENT
Start: 2020-02-18 | End: 2020-02-18

## 2020-02-18 RX ORDER — AMLODIPINE BESYLATE 10 MG/1
10 TABLET ORAL DAILY
Status: CANCELLED | OUTPATIENT
Start: 2020-02-19

## 2020-02-18 RX ORDER — LANOLIN ALCOHOL/MO/W.PET/CERES
3 CREAM (GRAM) TOPICAL
Status: DISCONTINUED | OUTPATIENT
Start: 2020-02-18 | End: 2020-02-29 | Stop reason: HOSPADM

## 2020-02-18 RX ORDER — LOSARTAN POTASSIUM 25 MG/1
25 TABLET ORAL DAILY
Status: DISCONTINUED | OUTPATIENT
Start: 2020-02-19 | End: 2020-02-29 | Stop reason: HOSPADM

## 2020-02-18 RX ORDER — ACETAMINOPHEN 325 MG/1
650 TABLET ORAL EVERY 6 HOURS PRN
Status: CANCELLED | OUTPATIENT
Start: 2020-02-18

## 2020-02-18 RX ORDER — POTASSIUM CHLORIDE 20 MEQ/1
40 TABLET, EXTENDED RELEASE ORAL EVERY 4 HOURS
Status: DISCONTINUED | OUTPATIENT
Start: 2020-02-18 | End: 2020-02-18

## 2020-02-18 RX ORDER — PANTOPRAZOLE SODIUM 40 MG/1
40 TABLET, DELAYED RELEASE ORAL
Status: CANCELLED | OUTPATIENT
Start: 2020-02-19

## 2020-02-18 RX ORDER — BUMETANIDE 2 MG/1
2 TABLET ORAL DAILY
Status: CANCELLED | OUTPATIENT
Start: 2020-02-19

## 2020-02-18 RX ORDER — POLYETHYLENE GLYCOL 3350 17 G/17G
17 POWDER, FOR SOLUTION ORAL DAILY PRN
Status: CANCELLED | OUTPATIENT
Start: 2020-02-18

## 2020-02-18 RX ORDER — AMLODIPINE BESYLATE 10 MG/1
10 TABLET ORAL DAILY
Status: DISCONTINUED | OUTPATIENT
Start: 2020-02-19 | End: 2020-02-29 | Stop reason: HOSPADM

## 2020-02-18 RX ORDER — AMOXICILLIN 250 MG
1 CAPSULE ORAL 2 TIMES DAILY
Status: CANCELLED | OUTPATIENT
Start: 2020-02-18

## 2020-02-18 RX ORDER — HEPARIN SODIUM 5000 [USP'U]/ML
7500 INJECTION, SOLUTION INTRAVENOUS; SUBCUTANEOUS EVERY 8 HOURS SCHEDULED
Status: CANCELLED | OUTPATIENT
Start: 2020-02-18

## 2020-02-18 RX ORDER — OXYCODONE HYDROCHLORIDE 5 MG/1
2.5 TABLET ORAL EVERY 6 HOURS PRN
Status: CANCELLED | OUTPATIENT
Start: 2020-02-18

## 2020-02-18 RX ORDER — POLYETHYLENE GLYCOL 3350 17 G/17G
17 POWDER, FOR SOLUTION ORAL DAILY PRN
Status: DISCONTINUED | OUTPATIENT
Start: 2020-02-18 | End: 2020-02-18

## 2020-02-18 RX ADMIN — MELATONIN 3 MG: at 22:00

## 2020-02-18 RX ADMIN — POTASSIUM CHLORIDE 40 MEQ: 1500 TABLET, EXTENDED RELEASE ORAL at 09:45

## 2020-02-18 RX ADMIN — BUMETANIDE 2 MG: 2 TABLET ORAL at 08:00

## 2020-02-18 RX ADMIN — CHLORHEXIDINE GLUCONATE 0.12% ORAL RINSE 15 ML: 1.2 LIQUID ORAL at 08:00

## 2020-02-18 RX ADMIN — HEPARIN SODIUM 7500 UNITS: 5000 INJECTION INTRAVENOUS; SUBCUTANEOUS at 13:25

## 2020-02-18 RX ADMIN — ATORVASTATIN CALCIUM 40 MG: 40 TABLET, FILM COATED ORAL at 17:44

## 2020-02-18 RX ADMIN — PANTOPRAZOLE SODIUM 40 MG: 40 TABLET, DELAYED RELEASE ORAL at 05:37

## 2020-02-18 RX ADMIN — SENNOSIDES AND DOCUSATE SODIUM 1 TABLET: 8.6; 5 TABLET ORAL at 08:00

## 2020-02-18 RX ADMIN — HEPARIN SODIUM 5000 UNITS: 5000 INJECTION INTRAVENOUS; SUBCUTANEOUS at 21:59

## 2020-02-18 RX ADMIN — LOSARTAN POTASSIUM 25 MG: 25 TABLET, FILM COATED ORAL at 08:00

## 2020-02-18 RX ADMIN — POTASSIUM CHLORIDE 40 MEQ: 1500 TABLET, EXTENDED RELEASE ORAL at 11:58

## 2020-02-18 RX ADMIN — LEVOTHYROXINE SODIUM 112 MCG: 112 TABLET ORAL at 05:37

## 2020-02-18 RX ADMIN — HEPARIN SODIUM 7500 UNITS: 5000 INJECTION INTRAVENOUS; SUBCUTANEOUS at 05:36

## 2020-02-18 RX ADMIN — AMLODIPINE BESYLATE 10 MG: 10 TABLET ORAL at 08:00

## 2020-02-18 RX ADMIN — SENNOSIDES AND DOCUSATE SODIUM 1 TABLET: 8.6; 5 TABLET ORAL at 17:44

## 2020-02-18 NOTE — DISCHARGE SUMMARY
Discharge Summary - [place] [address] Internal Medicine    Patient Information: Alvarado Acuna [de-identified] y o  female MRN: [id number]  Unit/Bed#: [facility] 607-01 Encounter: [id number]    Discharging Physician / Practitioner: Dean Read MD  PCP: Emiliano Yee MD  Admission Date: 2/10/2020  Discharge Date: 02/18/20    Reason for Admission:  Altered mental status and headache    Discharge Diagnoses:     Principal Problem:    Intraparenchymal hemorrhage of brain [facility])  Active Problems:    Benign essential hypertension    Chronic diastolic (congestive) heart failure (HCC)    Hypothyroidism    CKD (chronic kidney disease), stage III ([facility] [address] )    Hyperlipemia    Morbid obesity with body mass index of 40 0-44 9 in adult [facility])    Gastroesophageal reflux disease without esophagitis  Resolved Problems:    Leukocytosis    Hypokalemia      Consultations During Hospital Stay:  · Neurology  · Neurosurgery    Procedures Performed:   · CT head without contrast:  6 3 x 3 5 x 2 5 cm acute intracerebral hemorrhage in the right temporal lobe with mild mass effect but no midline shift or herniation  Probable small chronic right frontal subdural collection 2 mm in thickness  · CT head and neck shows re-identified large acute right temporal intraparenchymal hemorrhage with mass effect on the right lateral ventricular system and minimal to mild right to left midline shift  No focal stenosis or saccular aneurysm within the [place]  No hemodynamically significant stenosis within either common or internal carotid artery less than 50% stenosis  · CT head 02/12/2020 shows interval postoperative changes of right-sided decompressive craniectomy and evacuation of a large right temporal lobe intraparenchymal hematoma  Interval decrease in mass effect on the right lateral ventricle    No midline shift  · MRV head without contrast no evidence of dural venous sinus thrombosis  · MRI brain without contrast drainage of large temporal hemorrhage, brought right frontal parietal craniectomy, expected postoperative changes  No large branch ischemic disease  Significant Findings:   · Intraparenchymal hemorrhage status post urgent right decompressive craniectomy and evacuation    Incidental Findings:   · None    Test Results Pending at Discharge (will require follow up): · None     Outpatient Tests Requested:  · None    Complications:  None    Hospital Course:     Amol Eduardo is a [de-identified] y o  female patient who originally presented to the hospital on 2/10/2020 due to altered mental status and headache at [facility]  CT scan of the head showed right intracranial hemorrhage with significant mass effect, patient was administered medications to decreased blood pressure and decreased cerebral edema and transferred to [facility] for critical care and neurosurgical evaluation  Neurosurgery was consulted and imaging was done as above  Patient was taken for urgent right decompressive craniectomy and evacuation of intraparenchymal hematoma due to neuro exam decline  Patient tolerated the procedure well  Patient was kept on seizure prophylaxis for 7 days and blood pressure was kept below 160  She was transferred from ICU to the [facility] floor 02/15/2020  She was evaluated by PT and OT which recommended acute rehab  Patient was discharged to [address] [place] [address] acute rehab  Condition at Discharge: fair     Discharge Day Visit / Exam:     Subjective:  No acute overnight events  Patient states she feels well  Vitals: Blood Pressure: (!) 126/49 (02/18/20 0748)  Pulse: 77 (02/18/20 0748)  Temperature: 97 9 °F (36 6 °C) (02/18/20 0748)  Temp Source: Oral (02/16/20 1530)  Respirations: 18 (02/17/20 2230)  Height: 5' 1" (154 9 cm) (02/11/20 1549)  Weight - Scale: 91 2 kg (201 lb 1 oz) (02/18/20 0550)  SpO2: 95 % (02/18/20 0748)  Exam:   Physical Exam   Constitutional: She is oriented to person, place, and time  She appears well-developed and well-nourished  HENT:   Head: Normocephalic and atraumatic  Mouth/Throat: Oropharynx is clear and moist    Eyes: Pupils are equal, round, and reactive to light  Conjunctivae are normal    Neck: Neck supple  No JVD present  Cardiovascular: Normal rate, regular rhythm, normal heart sounds and intact distal pulses  Pulmonary/Chest: Effort normal and breath sounds normal    Abdominal: Soft  Bowel sounds are normal    Musculoskeletal: She exhibits no edema or tenderness  Neurological: She is alert and oriented to person, place, and time  Skin: Skin is warm and dry  Capillary refill takes less than 2 seconds  Psychiatric: She has a normal mood and affect  Her behavior is normal  Judgment and thought content normal    Nursing note and vitals reviewed  Discharge instructions/Information to patient and family:   See after visit summary for information provided to patient and family  Provisions for Follow-Up Care:  See after visit summary for information related to follow-up care and any pertinent home health orders  Disposition:     Acute Rehab at [address] [place] [address] to [address] [facility]:   · Not Applicable to this Patient - Not Applicable to this Patient    Planned Readmission: no     Discharge Statement:  I spent 38 minutes discharging the patient  This time was spent on the day of discharge  I had direct contact with the patient on the day of discharge  Greater than 50% of the total time was spent examining patient, answering all patient questions, arranging and discussing plan of care with patient as well as directly providing post-discharge instructions  Additional time then spent on discharge activities  Discharge Medications:  See after visit summary for reconciled discharge medications provided to patient and family  ** Please Note: Dragon 360 Dictation voice to text software may have been used in the creation of this document   **

## 2020-02-18 NOTE — SOCIAL WORK
Patient discharging to [facility] at 1:30pm room 973  RN should call report to   Provider, RN at bedside, patient and facility made aware of discharge arrangements

## 2020-02-18 NOTE — PLAN OF CARE
Problem: Potential for Falls  Goal: Patient will remain free of falls  Description  INTERVENTIONS:  - Assess patient frequently for physical needs  -  Identify cognitive and physical deficits and behaviors that affect risk of falls    -  [place] fall precautions as indicated by assessment   - Educate patient/family on patient safety including physical limitations  - Instruct patient to call for assistance with activity based on assessment  - Modify environment to reduce risk of injury  - Consider OT/PT consult to assist with strengthening/mobility  Outcome: Progressing     Problem: Prexisting or High Potential for Compromised Skin Integrity  Goal: Skin integrity is maintained or improved  Description  INTERVENTIONS:  - Identify patients at risk for skin breakdown  - Assess and monitor skin integrity  - Assess and monitor nutrition and hydration status  - Monitor labs   - Assess for incontinence   - Turn and reposition patient  - Assist with mobility/ambulation  - Relieve pressure over bony prominences  - Avoid friction and shearing  - Provide appropriate hygiene as needed including keeping skin clean and dry  - Evaluate need for skin moisturizer/barrier cream  - Collaborate with interdisciplinary team   - Patient/family teaching  - Consider wound care consult   Outcome: Progressing     Problem: NEUROSENSORY - ADULT  Goal: Achieves stable or improved neurological status  Description  INTERVENTIONS  - Monitor and report changes in neurological status  - Monitor vital signs such as temperature, blood pressure, glucose, and any other labs ordered   - Initiate measures to prevent increased intracranial pressure  - Monitor for seizure activity and implement precautions if appropriate      Outcome: Progressing  Goal: Remains free of injury related to seizures activity  Description  INTERVENTIONS  - Maintain airway, patient safety  and administer oxygen as ordered  - Monitor patient for seizure activity, document and report duration and description of seizure to physician/advanced practitioner  - If seizure occurs,  ensure patient safety during seizure  - Reorient patient post seizure  - Seizure pads on all 4 side rails  - Instruct patient/family to notify RN of any seizure activity including if an aura is experienced  - Instruct patient/family to call for assistance with activity based on nursing assessment  - Administer anti-seizure medications if ordered    Outcome: Progressing  Goal: Achieves maximal functionality and self care  Description  INTERVENTIONS  - Monitor swallowing and airway patency with patient fatigue and changes in neurological status  - Encourage and assist patient to increase activity and self care     - Encourage visually impaired, hearing impaired and aphasic patients to use assistive/communication devices  Outcome: Progressing     Problem: CARDIOVASCULAR - ADULT  Goal: Maintains optimal cardiac output and hemodynamic stability  Description  INTERVENTIONS:  - Monitor I/O, vital signs and rhythm  - Monitor for S/S and trends of decreased cardiac output  - Administer and titrate ordered vasoactive medications to optimize hemodynamic stability  - Assess quality of pulses, skin color and temperature  - Assess for signs of decreased coronary artery perfusion  - Instruct patient to report change in severity of symptoms  Outcome: Progressing  Goal: Absence of cardiac dysrhythmias or at baseline rhythm  Description  INTERVENTIONS:  - Continuous cardiac monitoring, vital signs, obtain 12 lead EKG if ordered  - Administer antiarrhythmic and heart rate control medications as ordered  - Monitor electrolytes and administer replacement therapy as ordered  Outcome: Progressing     Problem: RESPIRATORY - ADULT  Goal: Achieves optimal ventilation and oxygenation  Description  INTERVENTIONS:  - Assess for changes in respiratory status  - Assess for changes in mentation and behavior  - Position to facilitate oxygenation and minimize respiratory effort  - Oxygen administered by appropriate delivery if ordered  - Initiate smoking cessation education as indicated  - Encourage broncho-pulmonary hygiene including cough, deep breathe, Incentive Spirometry  - Assess the need for suctioning and aspirate as needed  - Assess and instruct to report SOB or any respiratory difficulty  - Respiratory Therapy support as indicated  Outcome: Progressing     Problem: GASTROINTESTINAL - ADULT  Goal: Minimal or absence of nausea and/or vomiting  Description  INTERVENTIONS:  - Administer IV fluids if ordered to ensure adequate hydration  - Maintain NPO status until nausea and vomiting are resolved  - Nasogastric tube if ordered  - Administer ordered antiemetic medications as needed  - Provide nonpharmacologic comfort measures as appropriate  - Advance diet as tolerated, if ordered  - Consider nutrition services referral to assist patient with adequate nutrition and appropriate food choices  Outcome: Progressing  Goal: Maintains or returns to baseline bowel function  Description  INTERVENTIONS:  - Assess bowel function  - Encourage oral fluids to ensure adequate hydration  - Administer IV fluids if ordered to ensure adequate hydration  - Administer ordered medications as needed  - Encourage mobilization and activity  - Consider nutritional services referral to assist patient with adequate nutrition and appropriate food choices  Outcome: Progressing  Goal: Maintains adequate nutritional intake  Description  INTERVENTIONS:  - Monitor percentage of each meal consumed  - Identify factors contributing to decreased intake, treat as appropriate  - Assist with meals as needed  - Monitor I&O, weight, and lab values if indicated  - Obtain nutrition services referral as needed  Outcome: Progressing     Problem: GENITOURINARY - ADULT  Goal: Maintains or returns to baseline urinary function  Description  INTERVENTIONS:  - Assess urinary function  - Encourage oral fluids to ensure adequate hydration if ordered  - Administer IV fluids as ordered to ensure adequate hydration  - Administer ordered medications as needed  - Offer frequent toileting  - Follow urinary retention protocol if ordered  Outcome: Progressing     Problem: METABOLIC, FLUID AND ELECTROLYTES - ADULT  Goal: Electrolytes maintained within normal limits  Description  INTERVENTIONS:  - Monitor labs and assess patient for signs and symptoms of electrolyte imbalances  - Administer electrolyte replacement as ordered  - Monitor response to electrolyte replacements, including repeat lab results as appropriate  - Instruct patient on fluid and nutrition as appropriate  Outcome: Progressing  Goal: Fluid balance maintained  Description  INTERVENTIONS:  - Monitor labs   - Monitor I/O and WT  - Instruct patient on fluid and nutrition as appropriate  - Assess for signs & symptoms of volume excess or deficit  Outcome: Progressing  Goal: Glucose maintained within target range  Description  INTERVENTIONS:  - Monitor Blood Glucose as ordered  - Assess for signs and symptoms of hyperglycemia and hypoglycemia  - Administer ordered medications to maintain glucose within target range  - Assess nutritional intake and initiate nutrition service referral as needed  Outcome: Progressing     Problem: SKIN/TISSUE INTEGRITY - ADULT  Goal: Skin integrity remains intact  Description  INTERVENTIONS  - Identify patients at risk for skin breakdown  - Assess and monitor skin integrity  - Assess and monitor nutrition and hydration status  - Monitor labs (i e  albumin)  - Assess for incontinence   - Turn and reposition patient  - Assist with mobility/ambulation  - Relieve pressure over bony prominences  - Avoid friction and shearing  - Provide appropriate hygiene as needed including keeping skin clean and dry  - Evaluate need for skin moisturizer/barrier cream  - Collaborate with interdisciplinary team (i e  Nutrition, Rehabilitation, etc )   - Patient/family teaching  Outcome: Progressing     Problem: HEMATOLOGIC - ADULT  Goal: Maintains hematologic stability  Description  INTERVENTIONS  - Assess for signs and symptoms of bleeding or hemorrhage  - Monitor labs  - Administer supportive blood products/factors as ordered and appropriate  Outcome: Progressing     Problem: MUSCULOSKELETAL - ADULT  Goal: Maintain or return mobility to safest level of function  Description  INTERVENTIONS:  - Assess patient's ability to carry out ADLs; assess patient's baseline for ADL function and identify physical deficits which impact ability to perform ADLs (bathing, care of mouth/teeth, toileting, grooming, dressing, etc )  - Assess/evaluate cause of self-care deficits   - Assess range of motion  - Assess patient's mobility  - Assess patient's need for assistive devices and provide as appropriate  - Encourage maximum independence but intervene and supervise when necessary  - Involve family in performance of ADLs  - Assess for home care needs following discharge   - Consider OT consult to assist with ADL evaluation and planning for discharge  - Provide patient education as appropriate  Outcome: Progressing     Problem: Neurological Deficit  Goal: Neurological status is stable or improving  Description  Interventions:  - Monitor and assess patient's level of consciousness, motor function, sensory function, and level of assistance needed for ADLs  - Monitor and report changes from baseline  Collaborate with interdisciplinary team to initiate plan and implement interventions as ordered  - Provide and maintain a safe environment  - Consider seizure precautions  - Consider fall precautions  - Consider aspiration precautions  - Consider bleeding precautions  Outcome: Progressing     Problem:  Activity Intolerance/Impaired Mobility  Goal: Mobility/activity is maintained at optimum level for patient  Description  Interventions:  - Assess and monitor patient  barriers to mobility and need for assistive/adaptive devices  - Assess patient's emotional response to limitations  - Collaborate with interdisciplinary team and initiate plans and interventions as ordered  - Encourage independent activity per ability   - Maintain proper body alignment  - Perform active/passive rom as tolerated/ordered  - Plan activities to conserve energy   - Turn patient as appropriate  Outcome: Progressing     Problem: Communication Impairment  Goal: Ability to express needs and understand communication  Description  Assess patient's communication skills and ability to understand information  Patient will demonstrate use of effective communication techniques, alternative methods of communication and understanding even if not able to speak  - Encourage communication and provide alternate methods of communication as needed  - Collaborate with case management/ for discharge needs  - Include patient/family/caregiver in decisions related to communication  Outcome: Progressing     Problem: Potential for Aspiration  Goal: Non-ventilated patient's risk of aspiration is minimized  Description  Assess and monitor vital signs, respiratory status, and labs (WBC)  Monitor for signs of aspiration (tachypnea, cough, rales, wheezing, cyanosis, fever)  - Assess and monitor patient's ability to swallow  - Place patient up in chair to eat if possible  - HOB up at 90 degrees to eat if unable to get patient up into chair   - Supervise patient during oral intake  - Instruct patient/ family to take small bites  - Instruct patient/ family to take small single sips when taking liquids  - Follow patient-specific strategies generated by speech pathologist   Outcome: Progressing  Goal: Ventilated patient's risk of aspiration is minimized  Description  Assess and monitor vital signs, respiratory status, airway cuff pressure, and labs (WBC)    Monitor for signs of aspiration (tachypnea, cough, rales, wheezing, cyanosis, fever)  - Elevate head of bed 30 degrees if patient has tube feeding   - Monitor tube feeding  Outcome: Progressing     Problem: Nutrition  Goal: Nutrition/Hydration status is improving  Description  Monitor and assess patient's nutrition/hydration status for malnutrition (ex- brittle hair, bruises, dry skin, pale skin and conjunctiva, muscle wasting, smooth red tongue, and disorientation)  Collaborate with interdisciplinary team and initiate plan and interventions as ordered  Monitor patient's weight and dietary intake as ordered or per policy  Utilize nutrition screening tool and intervene per policy  Determine patient's food preferences and provide high-protein, high-caloric foods as appropriate  - Assist patient with eating   - Allow adequate time for meals   - Encourage patient to take dietary supplement as ordered  - Collaborate with clinical nutritionist   - Include patient/family/caregiver in decisions related to nutrition  Outcome: Progressing     Problem: Nutrition/Hydration-ADULT  Goal: Nutrient/Hydration intake appropriate for improving, restoring or maintaining nutritional needs  Description  Monitor and assess patient's nutrition/hydration status for malnutrition  Collaborate with interdisciplinary team and initiate plan and interventions as ordered  Monitor patient's weight and dietary intake as ordered or per policy  Utilize nutrition screening tool and intervene as necessary  Determine patient's food preferences and provide high-protein, high-caloric foods as appropriate       INTERVENTIONS:  - Monitor oral intake, urinary output, labs, and treatment plans  - Assess nutrition and hydration status and recommend course of action  - Evaluate amount of meals eaten  - Assist patient with eating if necessary   - Allow adequate time for meals  - Recommend/ encourage appropriate diets, oral nutritional supplements, and vitamin/mineral supplements  - Order, calculate, and assess calorie counts as needed  - Recommend, monitor, and adjust tube feedings and TPN/PPN based on assessed needs  - Assess need for intravenous fluids  - Provide specific nutrition/hydration education as appropriate  - Include patient/family/caregiver in decisions related to nutrition  Outcome: Progressing     Problem: PAIN - ADULT  Goal: Verbalizes/displays adequate comfort level or baseline comfort level  Description  Interventions:  - Encourage patient to monitor pain and request assistance  - Assess pain using appropriate pain scale  - Administer analgesics based on type and severity of pain and evaluate response  - Implement non-pharmacological measures as appropriate and evaluate response  - Consider cultural and social influences on pain and pain management  - Notify physician/advanced practitioner if interventions unsuccessful or patient reports new pain  Outcome: Progressing     Problem: INFECTION - ADULT  Goal: Absence or prevention of progression during hospitalization  Description  INTERVENTIONS:  - Assess and monitor for signs and symptoms of infection  - Monitor lab/diagnostic results  - Monitor all insertion sites, i e  indwelling lines, tubes, and drains  - Monitor endotracheal if appropriate and nasal secretions for changes in amount and color  - [place] appropriate cooling/warming therapies per order  - Administer medications as ordered  - Instruct and encourage patient and family to use good hand hygiene technique  - Identify and instruct in appropriate isolation precautions for identified infection/condition  Outcome: Progressing     Problem: SAFETY ADULT  Goal: Maintain or return to baseline ADL function  Description  INTERVENTIONS:  -  Assess patient's ability to carry out ADLs; assess patient's baseline for ADL function and identify physical deficits which impact ability to perform ADLs (bathing, care of mouth/teeth, toileting, grooming, dressing, etc )  - Assess/evaluate cause of self-care deficits   - Assess range of motion  - Assess patient's mobility; develop plan if impaired  - Assess patient's need for assistive devices and provide as appropriate  - Encourage maximum independence but intervene and supervise when necessary  - Involve family in performance of ADLs  - Assess for home care needs following discharge   - Consider OT consult to assist with ADL evaluation and planning for discharge  - Provide patient education as appropriate  Outcome: Progressing  Goal: Maintain or return mobility status to optimal level  Description  INTERVENTIONS:  - Assess patient's baseline mobility status (ambulation, transfers, stairs, etc )    - Identify cognitive and physical deficits and behaviors that affect mobility  - Identify mobility aids required to assist with transfers and/or ambulation (gait belt, sit-to-stand, lift, walker, cane, etc )  - [place] fall precautions as indicated by assessment  - Record patient progress and toleration of activity level on Mobility SBAR; progress patient to next Phase/Stage  - Instruct patient to call for assistance with activity based on assessment  - Consider rehabilitation consult to assist with strengthening/weightbearing, etc   Outcome: Progressing     Problem: DISCHARGE PLANNING  Goal: Discharge to home or other facility with appropriate resources  Description  INTERVENTIONS:  - Identify barriers to discharge w/patient and caregiver  - Arrange for needed discharge resources and transportation as appropriate  - Identify discharge learning needs (meds, wound care, etc )  - Arrange for interpretive services to assist at discharge as needed  - Refer to Case Management Department for coordinating discharge planning if the patient needs post-hospital services based on physician/advanced practitioner order or complex needs related to functional status, cognitive ability, or social support system  Outcome: Progressing     Problem: Knowledge Deficit  Goal: Patient/family/caregiver demonstrates understanding of disease process, treatment plan, medications, and discharge instructions  Description  Complete learning assessment and assess knowledge base    Interventions:  - Provide teaching at level of understanding  - Provide teaching via preferred learning methods  Outcome: Progressing

## 2020-02-18 NOTE — PROGRESS NOTES
Reviewed chart, patient on stroke pathway  Met with patient to discuss follow up care, stroke education, and discharge planning  Explained neurovascular nurse navigator role  Patient resides with son, daughter-in-law, and grandson  Family assists with care as needed  Patient states she was previously driving herself to appointments and her medications  Provided patient a stroke education binder and my card  She verbalizes understanding of stroke symptoms  States she had not realized she was having stroke-like symptoms, notes headache, weakness, and elevated BP  Reviewed additional symptoms, type, personal risk factors and management, medications, and resources  Patient verbalizes understanding of teaching  Plan at this time is for patient to be discharged to rehab when medically cleared  Addressed questions  Patient does not have any additional questions or concerns at this time

## 2020-02-18 NOTE — PHYSICAL THERAPY NOTE
PHYSICAL THERAPY NOTE    Patient Name: Bhavya Hernandez  [id number] Date: 2/18/2020 02/18/20 0850   Pain Assessment   Pain Assessment No/denies pain   Pain Score No Pain   Restrictions/Precautions   Weight Bearing Precautions Per Order No   Braces or Orthoses Craniectomy Helmet   Other Precautions Cognitive; Chair Alarm; Fall Risk   General   Chart Reviewed Yes   Response to Previous Treatment Patient with no complaints from previous session  Family/Caregiver Present No   Cognition   Arousal/Participation Alert; Cooperative   Attention Attends with cues to redirect   Orientation Level Oriented X4   Following Commands Follows multistep commands without difficulty   Subjective   Subjective Pt seated OOB and agreeable to work w/ therapy   Bed Mobility   Supine to Sit Unable to assess   Sit to Supine Unable to assess   Additional Comments Pt seated OOB in chair upon arrival  Pt returned seated OOB at end of session w/ chair alarm activated and all needs within reach   Transfers   Sit to Stand 5  Supervision   Additional items Assist x 1   Stand to Sit 5  Supervision   Additional items Assist x 1   Additional Comments Transfers w/ RW; VCs for safe hand placement   Ambulation/Elevation   Gait pattern Excessively slow; Short stride;Decreased foot clearance;Shuffling   Gait Assistance 4  Minimal assist   Additional items Assist x 1;Verbal cues   Assistive Device Rolling walker   Distance 350ft   Stair Management Assistance Not tested   Balance   Static Sitting Fair +   Dynamic Sitting Fair   Static Standing Fair   Dynamic Standing Fair -   Ambulatory Fair -   Endurance Deficit   Endurance Deficit Yes   Endurance Deficit Description fatigue, pain   Activity Tolerance   Activity Tolerance Patient limited by fatigue;Patient limited by pain   Nurse Made Aware RN cleared pt for therapy   Assessment   Prognosis Good   Problem List Decreased strength;Decreased endurance; Impaired balance;Decreased mobility;Pain   Assessment Pt presents with decreased mobility, strength, balance, and activity tolerance  Pt demonstrated ability to perform STS functional transfers at supervision  Pt ambulated 350 ft with RW at [address]  Pt continues to require VCs for safety awareness throughout ambulation  Pt able to perform dynamic tasks w/ RW at [facility]  Pt reports being eager to go to rehab so she can get home faster  Pt continues to benefit from skilled therapy to maximize functional independence  Recommendation at this time is rehab  Pt would benefit from continued ambulation, functional transfers, strength, balance, and activity tolerance   Goals   Patient Goals To go to rehab   STG Expiration Date 02/28/20   PT Treatment Day 2   Plan   Treatment/Interventions Functional transfer training;LE strengthening/ROM; Endurance training;Patient/family training;Equipment eval/education;Gait training;Spoke to nursing   Progress Progressing toward goals   PT Frequency   (3-6x/week)   Recommendation   Recommendation Post acute IP rehab   Equipment Recommended Walker   PT - OK to Discharge Yes   Additional Comments when medically cleared to rehab     Jonathon Coleman, PT, DPT

## 2020-02-18 NOTE — CONSULTS
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  · Keppra completed  · Helmet when out of bed  · Therapy per primary service  · SBP goal < 160  HTN  · Continue amlodipine and losartan  Was on a higher dose of losartan as an outpt (25mg 2x daily)  · BP has been at goal  Continue to hold outpt propranolol 40mg every 12 hours  · Monitor BP every shift  CKD stage III  · Baseline 1 1 - 1 3  · Will continue to monitor  · Follows with Dr Molina Colindres as an outpt  Chronic combined systolic and diastolic heart failure  · Continue Bumex 2mg daily  Was on a higher dose as an outpt (2mg daily except M/W/F take 2mg 2x daily)  · Monitor volume status  Leukocytosis  · Likely reactive  · Trending down  · No signs of infx  Hypokalemia  · K 3 4 and replaced  · Continue to monitor BMP  Subjective/ HPI: Patient seen and examined  She is an [de-identified] female, with hypothyroidism, chronic combined systolic and diastolic heart failure, CKD stage III, GERD, gout, hyperlipidemia and HTN, who presented 2/10/2020 with altered mental status, headache, elevated blood pressure, nausea and vomiting to [address]  Head CT revealed a Rt parietal/temporal ICH with significant mass effect  CTA did not reveal any vascular abnormality as etiology for the hemorrhage  She was given Cardene and mannitol prior to transfer  Repeat head CT the following day revealed large acute intraparenchymal hemorrhage involving the right temporal lobe with mass effect on the right lateral ventricle and minimal to mild right to left midline shift, the size of the intraparenchymal hematoma was minimally increased from prior exam  She underwent urgent Rt decompressive craniectomy and evacuation of IPH by Dr Darylene Doss   Post-op head CT revealed post-op changes and decrease in mass effect on the Rt lateral ventricle  No midline shift was seen  She was extubated 2/13/2020  Brain MRI did not reveal an underlying mass lesion or ischemia  MRV did not reveal venous sinus thrombosis  ROS:   A 10 point ROS was performed; negative except as noted above       Social History:    Substance Use History:   Social History     Substance and Sexual Activity   Alcohol Use Not Currently    Alcohol/week: 0 0 standard drinks    Frequency: Never    Drinks per session: Patient refused    Binge frequency: Never    Comment: 0     Social History     Tobacco Use   Smoking Status Never Smoker   Smokeless Tobacco Never Used     Social History     Substance and Sexual Activity   Drug Use Never       Family History:    Family History   Problem Relation Age of Onset    Diabetes Mother     Coronary artery disease Mother     Arthritis Mother     Hypertension Mother     Hypertension Father     Diabetes Brother     Diabetes Son     Arthritis Family          Review of Scheduled Meds:      Labs:     Results from last 7 days   Lab Units 02/18/20  0449 02/17/20  0445   WBC Thousand/uL 10 98* 10 74*   HEMOGLOBIN g/dL 10 9* 10 8*   HEMATOCRIT % 34 3* 34 5*   PLATELETS Thousands/uL 174 172     Results from last 7 days   Lab Units 02/18/20  0449 02/17/20  0445   SODIUM mmol/L 139 139   POTASSIUM mmol/L 3 4* 3 1*   CHLORIDE mmol/L 105 105   CO2 mmol/L 28 29   BUN mg/dL 17 17   CREATININE mg/dL 1 00 0 86   CALCIUM mg/dL 8 5 8 3                      Lab Results   Component Value Date    URINECX 10,000-19,000 cfu/ml Mixed Contaminants X3 06/13/2017    URINECX 50,000-59,000 cfu/ml Klebsiella pneumoniae 03/09/2016    URINECX <10,000 cfu/ml Mixed Contaminants X2 03/09/2016       Input and Output Summary (last 24 hours):     No intake or output data in the 24 hours ending 02/18/20 1413    Imaging:     No orders to display       *Labs /Radiology studiesReviewed  *Medications reviewed and reconciled as needed  *Please refer to order section for additional ordered labs studies  *Case discussed with primary attending during morning huddle case rounds    Vitals:   Temp (24hrs), Av 2 °F (36 8 °C), Min:97 9 °F (36 6 °C), Max:98 5 °F (36 9 °C)    Temp:  [97 9 °F (36 6 °C)-98 5 °F (36 9 °C)] 97 9 °F (36 6 °C)  HR:  [73-92] 77  Resp:  [18] 18  BP: (117-142)/(46-58) 126/49  SpO2:  [95 %-96 %] 95 %  There is no height or weight on file to calculate BMI  Physical Exam:   HEENT:  Head: asymmetric shape, cranial incision without erythema or drainage  Well approximated  CARDIAC:  regular rate and rhythm, S1, S2 normal, no murmur, click, rub or gallop  LUNGS:  normal air entry, lungs clear to auscultation and air entry:  good  ABDOMEN:  soft, non-tender  Bowel sounds normal  No masses, no organomegaly  EXTREMITIES:  extremities normal, warm and well-perfused; no cyanosis, clubbing, or edema  NEURO:   normal without focal findings, mental status, speech normal, alert and oriented x3, JO-ANN and cranial nerves 2-12 intact  PSYCH:  Alert and oriented, appropriate affect  INCISION:  C/D/I      Invasive Devices     Peripheral Intravenous Line            Peripheral IV 20 Right;Ventral (anterior) Forearm 2 days                   Code Status: Prior  Current Length of Stay: 0 day(s)    Total floor / unit time spent today 1 hour with more than 50% spent counseling/coordinating care  Counseling includes discussion with patient re: progress  and discussion with patient of his/her current medical state/information  Coordination of patient's care was performed in conjunction with primary service  Time invested included review of patient's labs, vitals, and management of their comorbidities with continued monitoring  In addition, this patient was discussed with medical team including physician and advanced extenders  The care of the patient was extensively discussed and appropriate treatment plan was formulated unique for this patient       ** Please Note: Fluency Direct voice to text software may have been used in the creation of this document   Audio transcription errors may occur**

## 2020-02-18 NOTE — PLAN OF CARE
Problem: PHYSICAL THERAPY ADULT  Goal: Performs mobility at highest level of function for planned discharge setting  See evaluation for individualized goals  Outcome: Progressing  Note:   Prognosis: Good  Problem List: Decreased strength, Decreased endurance, Impaired balance, Decreased mobility, Pain  Assessment: Pt presents with decreased mobility, strength, balance, and activity tolerance  Pt demonstrated ability to perform STS functional transfers at supervision  Pt ambulated 350 ft with RW at [address] [place] [address]  Pt continues to require VCs for safety awareness throughout ambulation  Pt able to perform dynamic tasks w/ RW at [facility]  Pt reports being eager to go to rehab so she can get home faster  Pt continues to benefit from skilled therapy to maximize functional independence  Recommendation at this time is rehab  Pt would benefit from continued ambulation, functional transfers, strength, balance, and activity tolerance  Barriers to Discharge: Inaccessible home environment     Recommendation: Post acute IP rehab     PT - OK to Discharge: Yes    See flowsheet documentation for full assessment

## 2020-02-18 NOTE — H&P
PHYSICAL MEDICINE AND REHABILITATION H&P/ADMISSION NOTE  Darline Maria [de-identified] y o  female MRN: [id number]  Unit/Bed#: -01 Encounter: [id number]     Rehab Diagnosis: Impairment of mobility, safety, Activities of Daily Living (ADLs), and cognitive/communication skills due to Stroke:  01 4  No paresis    History of Present Illness:   Darline Maria is a [de-identified] y o  female with HTN, CHF, hypothyroidism, CKD 3, HLD who presented to the [address] [place] [address] on 2/10/20 with mental status changes, vomiting, and headache  She was found to have a 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  She required a craniectomy and evacuation performed by Dr Guicho Wells on 2/11/20  Post-operatively patient completed 7 days of Keppra for seizure ppx  Maintained in a cranial helmet while OOB  Cleared for SQ Heparin for DVT ppx  Further work-up revealed no mass, ischemia or critical stenosis  It was recommended to repeat CTH in 1 month  Patient found to have functional deficits from her baseline  Admitted to [address] [place] [address] on 2/18/20  Plan:    Rehabilitation plan: Begin acute inpatient rehabilitation with PT/OT/SLP  Goals are Mod I with mobility, self care, speech, swallow, cognition  ELOS 2 weeks  Estimated D/C home with assistance from her family   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 by Dr Guicho Wells on 2/11/20  Cranial precautions  +Helmet while OOB  F/U CTH in 1 month 3/11/20   HTN: managed on Norvasc, Cozaar   CHF: combined - managed on Bumex  ECHO showed an EF of 65%, concentric remodeling; RV mildly dilated, LA moderately to markedly dilated; RA mildly dilated; MV mild stenosis related to mitral annular calcification with moderate to severe regurgitation; AV mild regurgitation; mild to moderate TV regurgitation     GERD: managed on Protonix (therapeutic sub for omeprazole)   Hypothyroidism: managed on Synthroid   HLD: managed on Lipitor   CKD 3: baseline Cr = 1 1 - 1 3   DVT ppx: managed on SQ Heparin 5000 units Q8h (cleared by NSGY) and SCDs   Internal Medicine consulted for medical comanagement   Mild hypokalemia - repleted 40meq x 1   Mild leukocytosis trending down   Code Status: DNR/DNI    Subjective:  Patient seen face to face  Patient reports no pain or headache  She is answering questions and following commands appropriately  +BM  Review of Systems: A 10-point review of systems was performed  Negative except as listed above  Function:  Prior level of function and living situation:  Patient resides with her family son and DIL in a home in [address] [facility] with 2STE  [employer] Button is available to assist patient post d/c  Functionally, patient utilized a rollator at a Mod I level for mobility and was Mod I with self care  Current level of function:  Physical Therapy: Supervision - Min A with transfers and ambulation  Occupational Therapy: Min-Mod A   Speech Therapy:  Cleared for reg with thins     Physical Exam:  /56 (BP Location: Right arm)   Pulse 93   Temp 97 7 °F (36 5 °C) (Oral)   Resp 18   Ht 5' 1" (1 549 m)   Wt 92 1 kg (203 lb)   SpO2 94%   BMI 38 36 kg/m²      No intake or output data in the 24 hours ending 02/18/20 1440    Body mass index is 38 36 kg/m²  Physical Exam   Constitutional: She is oriented to person, place, and time  She appears well-developed and well-nourished  HENT:   Nose: Nose normal    Right craniectomy site visualized +staples and C/D/I   Eyes: Pupils are equal, round, and reactive to light  EOM are normal    Neck: Neck supple  Cardiovascular: Normal rate and regular rhythm  Pulmonary/Chest: Effort normal and breath sounds normal  She has no wheezes  She has no rales  Abdominal: Soft  Bowel sounds are normal  She exhibits no distension  There is no tenderness  Musculoskeletal: Normal range of motion  She exhibits no edema, tenderness or deformity     Neurological: She is alert and oriented to person, place, and time  Sensation to light touch intact x 4  Motor exam:   BUE: 4+/5   BLE: 4+/5  Coordination overall intact, however slightly slower movements in the left leg   Skin:   Scalp incisions C/D/I +Staples   Psychiatric: She has a normal mood and affect  Nursing note and vitals reviewed           Laboratory:    Lab Results   Component Value Date    SODIUM 139 02/18/2020    K 3 4 (L) 02/18/2020     02/18/2020    CO2 28 02/18/2020    BUN 17 02/18/2020    CREATININE 1 00 02/18/2020    GLUC 87 02/18/2020    CALCIUM 8 5 02/18/2020     Lab Results   Component Value Date    WBC 10 98 (H) 02/18/2020    HGB 10 9 (L) 02/18/2020    HCT 34 3 (L) 02/18/2020    MCV 86 02/18/2020     02/18/2020     Lab Results   Component Value Date    INR 1 13 02/10/2020    INR 1 01 03/09/2016    PROTIME 13 9 02/10/2020    PROTIME 10 7 03/09/2016       Imaging: personally reviewed       Current Facility-Administered Medications:     acetaminophen (TYLENOL) tablet 650 mg, 650 mg, Oral, Q6H PRN, Dean Wakefield MD    [START ON 2/19/2020] amLODIPine (NORVASC) tablet 10 mg, 10 mg, Oral, Daily, Dean Wakefield MD    atorvastatin (LIPITOR) tablet 40 mg, 40 mg, Oral, QPM, Dean Wakefield MD    bisacodyl (DULCOLAX) rectal suppository 10 mg, 10 mg, Rectal, Daily PRN, Dean Wakefield MD  Valaria Reil  [START ON 2/19/2020] bumetanide (BUMEX) tablet 2 mg, 2 mg, Oral, Daily, Dean Wakefield MD    heparin (porcine) subcutaneous injection 7,500 Units, 7,500 Units, Subcutaneous, Q8H [address], Dean Wakefield MD    [START ON 2/19/2020] levothyroxine tablet 112 mcg, 112 mcg, Oral, Early Morning, Dean Wakefield MD    [START ON 2/19/2020] losartan (COZAAR) tablet 25 mg, 25 mg, Oral, Daily, Dean Wakefield MD    melatonin tablet 3 mg, 3 mg, Oral, HS, Dean Wakefield MD    ondansetron (ZOFRAN-ODT) dispersible tablet 4 mg, 4 mg, Oral, Q6H PRN, Dean Wakefield MD    oxyCODONE (ROXICODONE) IR tablet 2 5 mg, 2 5 mg, Oral, Q6H PRN, Sonja Su MD Robin Sanchezalexander Mccollum ON 2/19/2020] pantoprazole (PROTONIX) EC tablet 40 mg, 40 mg, Oral, Early Morning, Rocio Mcwilliams MD    polyethylene glycol (MIRALAX) packet 17 g, 17 g, Oral, Daily PRN, Rocio Mcwilliams MD    potassium chloride (K-DUR,KLOR-CON) CR tablet 40 mEq, 40 mEq, Oral, Q4H, Rocio Mcwilliams MD    senna-docusate sodium (SENOKOT S) 8 6-50 mg per tablet 1 tablet, 1 tablet, Oral, BID, Rocio Mcwilliams MD  No Known Allergies   Patient Active Problem List    Diagnosis Date Noted    Intraparenchymal hemorrhage of brain ([place] [address] ) 02/10/2020    Peripheral arteriosclerosis ([place] [address] ) 06/21/2019    Osteoporosis screening 03/08/2019    Eczema 12/03/2018    SOB (shortness of breath) 10/26/2018    Radiculopathy of lumbar region 10/02/2018    Corns 10/02/2018    Gastroesophageal reflux disease without esophagitis 06/04/2018    Iron deficiency anemia secondary to inadequate dietary iron intake 06/04/2018    Pain in both feet 03/15/2018    Alkaline phosphatase elevation 03/12/2018    Lumbar disc herniation with radiculopathy 12/08/2017    Hyperlipemia 11/15/2017    Chronic low back pain 10/24/2017    Hyperuricemia 06/23/2017    Pseudogout of left wrist 06/23/2017    Nephrolithiasis 06/21/2017    Acute on chronic diastolic congestive heart failure ([facility] ) 06/02/2017    Chronic venous insufficiency 06/02/2017    Elevated triglycerides with high cholesterol 06/02/2017    CKD (chronic kidney disease), stage III ([facility] ) 05/19/2017    Benign essential hypertension 08/22/2016    Chronic diastolic (congestive) heart failure ([facility] ) 08/22/2016    Hypothyroidism 08/22/2016    Morbid obesity due to excess calories ([facility] ) 08/20/2016    Diverticulitis of colon 08/18/2016    WENDI (obstructive sleep apnea) 05/20/2016    Onychomycosis 04/29/2016    Morbid obesity with body mass index of 40 0-44 9 in adult [facility]) 03/10/2016    Knee pain 05/22/2015    Tarsal tunnel syndrome 06/24/2014    Difficulty in walking 03/24/2014    Plantar fascial fibromatosis     Umbilical hernia 15/17/2791    Atherosclerosis of arteries of extremities (HCC) 2013    Hallux abductovalgus with bunions, unspecified laterality 2013    Chronic gout without tophus 2013    Arthropathy of knee 2012    Rupture of popliteal cyst 2012    Anxiety 2012    Hiatal hernia 2012     Past Medical History:   Diagnosis Date    Anxiety     Arthritis     joints    Cataract     Disease of thyroid gland     Eczema     GERD (gastroesophageal reflux disease)     Gout     Heart failure ([address] )     Hepatitis     Hiatal hernia     Hypertension     Onychomycosis     last assessed: 16    Orthopnea     resolved: 16    Sleep apnea     wears CPAP     Past Surgical History:   Procedure Laterality Date    ABDOMINAL SURGERY          BRAIN HEMATOMA EVACUATION Right 2020    Procedure: Right decompressive craniectomy and evacuation of intraparenchymal hematoma; Surgeon: Thai Najera MD;  Location:  MAIN OR;  Service: Neurosurgery    BREAST SURGERY Right     cyst removal    CARPAL TUNNEL RELEASE Left     CARPAL TUNNEL RELEASE Right     CATARACT EXTRACTION       SECTION  1960    x1    COLONOSCOPY N/A 2018    Procedure: COLONOSCOPY;  Surgeon: Livia Wong MD;  [facility]; Service: Gastroenterology    DILATION AND CURETTAGE OF UTERUS  1967    EYE SURGERY Left     cataracts    HERNIA REPAIR      umbilical hernia    INCISIONAL HERNIA REPAIR      incarcerated    KNEE ARTHROSCOPY Right     SD XCAPSL CTRC RMVL INSJ IO LENS PROSTH W/O ECP Right 3/27/2017    Procedure: EXTRACTION EXTRACAPSULAR CATARACT PHACO INTRAOCULAR LENS (IOL);   Surgeon: Alejnadrina Hernandez MD;  [facility] MAIN OR;  Service: Ophthalmology     Social History     Socioeconomic History    Marital status: /Civil Union     Spouse name: Not on file    Number of children: Not on file  Years of education: Not on file    Highest education level: Not on file   Occupational History    Not on file   Social Needs    Financial resource strain: Not on file    Food insecurity:     Worry: Not on file     Inability: Not on file    Transportation needs:     Medical: Not on file     Non-medical: Not on file   Tobacco Use    Smoking status: Never Smoker    Smokeless tobacco: Never Used   Substance and Sexual Activity    Alcohol use: Not Currently     Alcohol/week: 0 0 standard drinks     Frequency: Never     Drinks per session: Patient refused     Binge frequency: Never     Comment: 0    Drug use: Never    Sexual activity: Not Currently     Birth control/protection: Post-menopausal   Lifestyle    Physical activity:     Days per week: Not on file     Minutes per session: Not on file    Stress: Not on file   Relationships    Social connections:     Talks on phone: Not on file     Gets together: Not on file     Attends [employer] service: Not on file     Active member of club or organization: Not on file     Attends meetings of clubs or organizations: Not on file     Relationship status: Not on file    Intimate partner violence:     Fear of current or ex partner: Not on file     Emotionally abused: Not on file     Physically abused: Not on file     Forced sexual activity: Not on file   Other Topics Concern    Not on file   Social History Narrative    Daily coffee consumption    Lack of exercise    Sleeps 6-7 hours a day     Family History   Problem Relation Age of Onset    Diabetes Mother     Coronary artery disease Mother     Arthritis Mother     Hypertension Mother     Hypertension Father     Diabetes Brother     Diabetes Son     Arthritis Family          Medical Necessity Criteria for [facility] Admission: Electrolyte imbalance:  hypokalemia, Hypertension and Incision/Wound care   In addition, the preadmission screen, post-admission physical evaluation, overall plan of care and admissions order demonstrate a reasonable expectation that the following criteria were met at the time of admission to the [facility]  1  The patient requires active and ongoing therapeutic intervention of multiple therapy disciplines (physical therapy, occupational therapy, speech-language pathology, or prosthetics/orthotics), one of which is physical or occupational therapy  2  Patient requires an intensive rehabilitation therapy program, as defined in Chapter 1, section 110 2 2 of the CMS Medicare Policy Manual  This intensive rehabilitation therapy program will consist of at least 3 hours of therapy per day at least 5 days per week or at least 15 hours of intensive rehabilitation therapy within a 7 consecutive day period, beginning with the date of admission to the [facility]  3  The patient is reasonably expected to actively participate in, and benefit significantly from, the intensive rehabilitation therapy program as defined in Chapter 1, section 110 2 2 of the CMS Medicare Policy Manual at this time of admission to the [facility]  She can reasonably be expected to make measurable improvement (that will be of practical value to improve the patients functional capacity or adaptation to impairments) as a result of the rehabilitation treatment, as defined in section 110 3, and such improvement can be expected to be made within the prescribed period of time  As noted in the CMS Medicare Policy Manual, the patient need not be expected to achieve complete independence in the domain of self-care nor be expected to return to his or her prior level of functioning in order to meet this standard  4  The patient must require physician supervision by a rehabilitation physician   As such, a rehabilitation physician will conduct face-to-face visits with the patient at least 3 days per week throughout the patients stay in the [facility] to assess the patient both medically and functionally, as well as to modify the course of treatment as needed to maximize the patients capacity to benefit from the rehabilitation process  5  The patient requires an intensive and coordinated interdisciplinary approach to providing rehabilitation, as defined in Chapter 1, section 110 2 5 of the CMS Medicare Policy Manual  This will be achieved through periodic team conferences, conducted at least once in a 7-day period, and comprising of an interdisciplinary team of medical professionals consisting of: a rehabilitation physician, registered nurse,  and/or , and a licensed/certified therapist from each therapy discipline involved in treating the patient  Changes Since Pre-admission Assessment: None -This patient's participation in rehab continues to be reasonable, necessary and appropriate  CMS Required Post-Admission Physician Evaluation Elements  History and Physical, including medical history, functional history and active comorbidities as in above text  PostAdmission Physician Evaluation:  The patient has the potential to make improvement and is in need of physical, occupational, and/or therapy services  The patient may also need nutritional services  Given the patient's complex medical condition and risk of further medical complications, rehabilitative services cannot be safely provided at a lower level of care, such as a skilled nursing facility  I have reviewed the patient's functional and medical status at the time of the preadmission screening and they are the same as on the day of this admission  I acknowledge that I have personally performed a full physical examination on this patient within 24 hours of admission  The patient and/or family demonstrated understanding the rehabilitation program and the discharge process after we discussed them       Agree in entirety: yes  Minor adaptions: none    Major changes: none     King Yanci MD  Physical Medicine and Rehabilitation    ** Please Note: Fluency Direct voice to text software may have been used in the creation of this document   **

## 2020-02-19 PROCEDURE — 92523 SPEECH SOUND LANG COMPREHEN: CPT

## 2020-02-19 PROCEDURE — 97129 THER IVNTJ 1ST 15 MIN: CPT

## 2020-02-19 PROCEDURE — 99232 SBSQ HOSP IP/OBS MODERATE 35: CPT | Performed by: PHYSICAL MEDICINE & REHABILITATION

## 2020-02-19 PROCEDURE — 97535 SELF CARE MNGMENT TRAINING: CPT

## 2020-02-19 PROCEDURE — 97165 OT EVAL LOW COMPLEX 30 MIN: CPT

## 2020-02-19 PROCEDURE — 97161 PT EVAL LOW COMPLEX 20 MIN: CPT

## 2020-02-19 PROCEDURE — 97530 THERAPEUTIC ACTIVITIES: CPT

## 2020-02-19 RX ADMIN — BUMETANIDE 2 MG: 2 TABLET ORAL at 09:47

## 2020-02-19 RX ADMIN — PANTOPRAZOLE SODIUM 40 MG: 40 TABLET, DELAYED RELEASE ORAL at 06:15

## 2020-02-19 RX ADMIN — AMLODIPINE BESYLATE 10 MG: 10 TABLET ORAL at 09:46

## 2020-02-19 RX ADMIN — HEPARIN SODIUM 5000 UNITS: 5000 INJECTION INTRAVENOUS; SUBCUTANEOUS at 21:33

## 2020-02-19 RX ADMIN — LEVOTHYROXINE SODIUM 112 MCG: 112 TABLET ORAL at 06:14

## 2020-02-19 RX ADMIN — SENNOSIDES AND DOCUSATE SODIUM 1 TABLET: 8.6; 5 TABLET ORAL at 09:46

## 2020-02-19 RX ADMIN — LOSARTAN POTASSIUM 25 MG: 25 TABLET, FILM COATED ORAL at 09:46

## 2020-02-19 RX ADMIN — ATORVASTATIN CALCIUM 40 MG: 40 TABLET, FILM COATED ORAL at 17:02

## 2020-02-19 RX ADMIN — MELATONIN 3 MG: at 21:33

## 2020-02-19 RX ADMIN — HEPARIN SODIUM 5000 UNITS: 5000 INJECTION INTRAVENOUS; SUBCUTANEOUS at 06:14

## 2020-02-19 RX ADMIN — HEPARIN SODIUM 5000 UNITS: 5000 INJECTION INTRAVENOUS; SUBCUTANEOUS at 14:26

## 2020-02-19 NOTE — PCC PHYSICAL THERAPY
Patient making fair progress with skilled PT intervention thusfar  Patient able to engage in appx 30 minutes of activity at a time however does not tolerate extended activity 2* back pain and fatigue  She also has difficulty attending to directions and focusing on a task  At times, patient observed leaving RW aside to perform short distance ambulation without RW  Educated on safety precautions and recommendation to utilize RW at all times  Patient does attempt to reach for external support, ie furniture surfs, however does not lose balance   Plan for family training Friday with DC Saturday with recommended 24 hour S

## 2020-02-19 NOTE — PCC SPEECH THERAPY
Cognitive linguistic evaluation was initiated where pt demonstrating functional basic level comprehension and expression, however, pt most notably limited by deficits in attention and working memory where pt was significantly tangential and verbose  Pt also presenting w/ decreased insight into deficits and problem solving, impacting safety awareness  Pt may be recommended for 24 hour supervision at time of discharge  Due to current cognitive linguistic deficits, recommending skilled ST services at this time to maximize functional cognitive linguistic skills in order to improve level of independence and safety  Plan to complete remainder of CLQT+ in follow up session to gain additional insight into pt's current deficits and to further assist in guiding treatment plan  Update week of 2/26/2020: Pt continues to be followed for cognitive linguistic skills where pt is making slow progress towards goals  Pt completed the remainder of the CLQT+ with results correlating to overall MODERATE cognitive linguistic deficits at time of assessment and in comparison to age matched peers ranging from 65-81 y/o  Pt's most notable deficits were in attention, executive functions and memory  During therapy sessions, pt continues to present with deficits in attention (divided, sustained), STM recall, working memory, safety, problem solving/judgement and insight into current deficits  Despite continued education, pt remains with decreased awareness, insight and acceptance to understanding cognitive linguistic deficits since stroke  Pt has also been provided with stroke education and pt's family has been educated on current recommendations for 24/hr supervision/assistance, current deficits and recommendations for outpatient services  Will continue to recommend skilled ST services at this time to maximize functional cognitive linguistic skills and safety

## 2020-02-19 NOTE — PCC OCCUPATIONAL THERAPY
Pt is making progress and currently functioning at overall CGA- for ADLs and fxnl mobility w/ RW  Cont to be limited by impaired safety awareness, impaired insight into deficits, poor problem solving, impaired cognitive flexibility, poor attention to task, poor frustration tolerance, fatigue, and poor endurance  Plan for pt to d/c home w/ family support and recommending 24 hour supervision and A w/ IADLs 2* cognitive deficits  Family training scheduled for 2/28

## 2020-02-19 NOTE — PLAN OF CARE

## 2020-02-19 NOTE — PROGRESS NOTES
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  · Keppra completed  · Helmet when out of bed  · SBP goal < 160      HTN  · on Amlodipine/Losartan  Was on a higher dose of losartan as an outpt (25mg 2x daily)  · Continue to hold outpt propranolol 40mg every 12 hours  · No changes today/at goal of <776 systolic     CKD stage III; baseline 1 1-1 3  · stable  · Follows with Dr Cheo Fang as an outpt      Chronic combined systolic and diastolic heart failure  · Continue Bumex 2mg daily  Was on a higher dose as an outpt (2mg daily except M/W/F take 2mg 2x daily)  · stable     Leukocytosis  · Likely reactive  · Trending down  · No signs of infx      Hypokalemia  · K 3 4 and replaced 2/18/20  · BMP 2/20/20    The above assessment and plan was reviewed and updated as determined by my evaluation of the patient on 2/19/2020      Labs:   Results from last 7 days   Lab Units 02/18/20  0449 02/17/20  0445   WBC Thousand/uL 10 98* 10 74*   HEMOGLOBIN g/dL 10 9* 10 8*   HEMATOCRIT % 34 3* 34 5*   PLATELETS Thousands/uL 174 172     Results from last 7 days   Lab Units 02/18/20  0449 02/17/20  0445   SODIUM mmol/L 139 139   POTASSIUM mmol/L 3 4* 3 1*   CHLORIDE mmol/L 105 105   CO2 mmol/L 28 29   BUN mg/dL 17 17   CREATININE mg/dL 1 00 0 86   CALCIUM mg/dL 8 5 8 3                   Review of Scheduled Meds:    Current Facility-Administered Medications:  acetaminophen 650 mg Oral Q6H PRN Teodora Gabriel MD   amLODIPine 10 mg Oral Daily Teodora Gabriel MD   atorvastatin 40 mg Oral QPM Teodora Gabriel MD   bisacodyl 10 mg Rectal Daily PRN Teodora Gabriel MD   bumetanide 2 mg Oral Daily Teodora Gabriel MD   heparin (porcine) 5,000 Units Subcutaneous [facility] Teodora Gabriel MD levothyroxine 112 mcg Oral Early Morning Shruthi Ochoa MD   losartan 25 mg Oral Daily Shruthi Ochoa MD   melatonin 3 mg Oral HS Shruthi Ochoa MD   ondansetron 4 mg Oral Q6H PRN Shruthi Ochoa MD   oxyCODONE 2 5 mg Oral Q6H PRN Shruthi Ochoa MD   pantoprazole 40 mg Oral Early Morning Shruhti Ochoa MD   polyethylene glycol 17 g Oral Daily PRN Shruthi Ochoa MD   senna-docusate sodium 1 tablet Oral BID Shruthi Ochoa MD       Subjective/ HPI: Patients overnight issues or events were reviewed with nursing or staff during rounds or morning huddle session  No new or overnight issues  Offers no complaints  ROS:   A 10 point ROS was performed; negative except as noted above         Imaging:     No orders to display       *Labs /Radiology studiesReviewed  *Medications Reviewed and reconciled as needed  *Please refer to order section for additional ordered labs studies  *Case discussed with primary attending during morning huddle case rounds    Physical Examination:  Vitals:   Vitals:    02/18/20 1423 02/18/20 2213 02/19/20 0538   BP: 117/56 149/67 144/68   BP Location: Right arm Right arm Right arm   Pulse: 93 81 74   Resp: 18 18 18   Temp: 97 7 °F (36 5 °C) 97 8 °F (36 6 °C) 98 °F (36 7 °C)   TempSrc: Oral Oral Oral   SpO2: 94% 96% 95%   Weight: 92 1 kg (203 lb)  92 kg (202 lb 13 2 oz)   Height: 5' 1" (1 549 m)         General Appearance: no distress, conversive  HEENT: PERRLA, conjuctiva normal; oropharynx clear; mucous membranes moist; right craniectomy site is soft and staples intact = no erythema/drainage   Neck:  Supple, no JVD; no pain with movement  Lungs: CTA, normal respiratory effort, no retractions, expiratory effort normal  CV: regular rate and rhythm; no rubs/murmurs/gallops, PMI normal   ABD: soft; ND/NT; +BS  EXT: no edema  Skin: normal turgor, normal texture, no rashes  Psych: affect normal, mood normal  Neuro: AAO; RASCON 5/5    The above physical exam was reviewed and updated as determined by my evaluation of the patient on 2/19/2020  Invasive Devices     Peripheral Intravenous Line            Peripheral IV 02/16/20 Right;Ventral (anterior) Forearm 3 days                   VTE Pharmacologic Prophylaxis: Heparin  Code Status: Level 3 - DNAR and DNI  Current Length of Stay: 1 day(s)      Total time spent:  30 minutes with more than 50% spent counseling/coordinating care  Counseling includes discussion with patient re: progress  and discussion with patient of his/her current medical state/information  Coordination of patient's care was performed in conjunction with primary service  Time invested included review of patient's labs, vitals, and management of their comorbidities with continued monitoring  In addition, this patient was discussed with medical team including physician and advanced extenders  The care of the patient was extensively discussed and appropriate treatment plan was formulated unique for this patient  ** Please Note:  voice to text software may have been used in the creation of this document   Although proof errors in transcription or interpretation are a potential of such software**

## 2020-02-19 NOTE — PROGRESS NOTES
ARC PT EVALUATION       02/19/20 6850   Patient Data   Rehab Impairment Impairment of mobility, safety, Activities of Daily Living (ADLs), and cognitive/communication skills due to Stroke:    No paresis   Etiologic Diagnosis Right Temporal Intracranial Hemorrhage   Date of Onset 02/10/20   Support System   Name Patient lives at home with her son, LUDWIG and grandson  Son and LUDWIG work however on Wednesdays, son works from home  Grandson able to provide S/A at home as he does not currently work    Able to provide 24 hour supervision Yes   Able to provide physical help? Yes   Home Setup   Type of Home Single Level   Method of Entry Stairs;Hand Rail Bilateral   Number of Stairs 1   Number of Stairs in Home 0   First Floor Bathroom   (step over tub; sponge bathes occasionally )   First Floor Bathroom Accessibility Grab bars in tub/shower  (suction cup grab bars)   First Floor Setup Available Yes   Home Modifications Necessary? No   Available Equipment Roller Walker;Rollator;Single [employer]; Long Handled Adaptive Equipment  (lift chair )   Baseline Information   Outpatient Services Received Prior to Admission Physical Therapy   Outpatient Provider 2 years ago with [facility] [address] [facility] ; [address]   Prior Device(s) Used Rollator   Prior IADL Participation   Meal Preparation Full Participation   Laundry Partial Participation  (shared with daughter )   Home Cleaning Other  (daughter performs vs cleaning service )   Prior Level of Function   Self-Care 3  Independent - Patient completed the activities by him/herself, with or without an assistive device, with no assistance from a helper  Indoor-Mobility (Ambulation) 3  Independent - Patient completed the activities by him/herself, with or without an assistive device, with no assistance from a helper  Stairs 3   Independent - Patient completed the activities by him/herself, with or without an assistive device, with no assistance from a helper  Functional Cognition 3  Independent - Patient completed the activities by him/herself, with or without an assistive device, with no assistance from a helper  Prior Assistance Needed for Shopping;Household Chores/Cleaning   Prior Device Used [employer] [place] in the Last Year   Number of falls in the past 12 months 1  (July 2019)   Type of Injury Associated with Fall Injury  (hurt her knee)   Restrictions/Precautions   Precautions Bed/chair alarms;Cognitive; Fall Risk;Supervision on toilet/commode;Seizure  (CRANI PREC; HELMET; KEEP HOB ELEVATED >30* AT ALL TIMES )   Weight Bearing Restrictions No   ROM Restrictions No   Braces or Orthoses Craniectomy Helmet   Pain Assessment   Pain Assessment No/denies pain   Pain Score No Pain   Toilet Transfer   Surface Assessed Standard Commode   Transfer Technique Standard   Limitations Noted In Balance; Sequencing   Adaptive Equipment Walker   Positioning Concerns Safety   Findings verbal instruction for proper hand placement    Type of Assistance Needed Physical assistance   Amount of Physical Assistance Provided Less than 25%   Toilet Transfer CARE Score 3   Roll Left and Right   Type of Assistance Needed Physical assistance   Amount of Physical Assistance Provided Less than 25%   Roll Left and Right CARE Score 3   Sit to Lying   Type of Assistance Needed Supervision   Amount of Physical Assistance Provided No physical assistance   Sit to Lying CARE Score 4   Lying to Sitting on Side of Bed   Type of Assistance Needed Physical assistance   Amount of Physical Assistance Provided Less than 25%   Comment verbal instruciton for log roll technique to reduce crani pressure    Lying to Sitting on Side of Bed CARE Score 3   Sit to Stand   Type of Assistance Needed Physical assistance   Amount of Physical Assistance Provided Less than 25%   Comment requires verbal instruction for proper hand placement; improved carryover towards end of session    Sit to Stand CARE Score 3 Picking Up Object   Type of Assistance Needed Physical assistance   Amount of Physical Assistance Provided Less than 25%   Comment steadying in stance; utilized reacher; reports having one at home    Picking Up Object CARE Score 3   Car Transfer   Type of Assistance Needed Physical assistance   Amount of Physical Assistance Provided Less than 25%   Car Transfer CARE Score 3   Ambulation   Primary Mode of Locomotion Prior to Admission Walk   Distance Walked (feet) 15 ft  (x3 + 120' )   Assist Device Roller Walker   Gait Pattern Trendelenburg; Improper weight shift; Forward Flexion   Limitations Noted In Speed;Strength;Swing;Endurance;Balance   Provided Assistance with: Balance;Direction   Findings reports using a Rolator at home; usease seat to help carry items    Walk 10 Feet   Type of Assistance Needed Physical assistance   Amount of Physical Assistance Provided Less than 25%   Walk 10 Feet CARE Score 3   Walk 50 Feet with Two Turns   Type of Assistance Needed Physical assistance   Amount of Physical Assistance Provided Less than 25%   Walk 50 Feet with Two Turns CARE Score 3   Walk 150 Feet   Comment limited by fatigue    Reason if not Attempted Safety concerns   Walk 150 Feet CARE Score 88   Walking 10 Feet on Uneven Surfaces   Type of Assistance Needed Physical assistance   Amount of Physical Assistance Provided 25%-49%   Comment on curb/ramp using RW; poor overall safety awareness   Walking 10 Feet on Uneven Surfaces CARE Score 3   Wheel 50 Feet with Two Turns   Reason if not Attempted Activity not applicable   Wheel 50 Feet with Two Turns CARE Score 9   Wheel 150 Feet   Reason if not Attempted Activity not applicable   Wheel 712 Feet CARE Score 9   Curb or Single Stair   Style negotiated Curb   Type of Assistance Needed Physical assistance   Amount of Physical Assistance Provided 25%-49%   Comment overall Zina, mod cues for sequencing and safety    1 Step (Curb) CARE Score 3   4 Steps   Reason if not Attempted to rehab with seizure and craniectomy precautions with helmet  She presents with an additional problem list which includes HTN, CHF, 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, CKD, WENDI, anxiety, and chronic venous insufficiency  Please refer to chart for a more comprehensive list  PTA, patient living at home with her son, LUDWIG and grandson  She was fully (I), driving and active within her home  Patient with 1 KUMAR and a first floor set up  [place] able to assist patient with S during the day while patient's son and LUDWIG work  Patient uses a RW vs Rolator at all times and admits to 1 fall back in July  On evaluation, patient presents with impaired balance and righting reactions, decreased overall strength, impaired activity tolerance and decreased overall functional mobility  She is tangential however able to be redirected  Patient with impaired safety awareness however responded well during session to repeated instruction on proper hand placement and use of RW  She presents as a good rehab candidate and will require skilled PT intervention to progress functional mobility (I) and safety  Anticipate need for 7-10 days to achieve overall mod(I) household and S community distance mobilization      PT Therapy Minutes   PT Time In 1330   PT Time Out 1430   PT Total Time (minutes) 60   PT Mode of treatment - Individual (minutes) 60   PT Mode of treatment - Concurrent (minutes) 0   PT Mode of treatment - Group (minutes) 0   PT Mode of treatment - Co-treat (minutes) 0   PT Mode of Treatment - Total time(minutes) 60 minutes   PT Cumulative Minutes 60   Cumulative Minutes   Cumulative therapy minutes 150

## 2020-02-19 NOTE — PCC NURSING
Pt admitted S/P R ICH requiring R craniectomy on 2/11  Incision healing ALFREDO  Pt to wear helmet oob  PMH: HTN, CHF, hypothyroidism, CKD 3, HLD  Post-operatively patient completed 7 days of Keppra for seizure ppx  Seizure precautions  Cleared for SQ Heparin for DVT ppx  Repeat CTH done on 2/24  For HTN- SBP goal < 160  Continue amlodipine and losartan  Was on a higher dose of losartan as an outpt (25mg 2x daily)  BP has been at goal  Continue to hold outpt propranolol 40mg every 12 hour  CKD3- Baseline 1 1 - 1 3  Chronic combined systolic and diastolic heart failure- Continue Bumex 2mg daily  Was on a higher dose as an outpt (2mg daily except M/W/F take 2mg 2x daily)  Monitor volume status  Leukocytosis- Likely reactive  Pt is continent of bowel and bladder  CPAP@ HS for sleep apnea  Takes melatonin @ hs for insomnia  Pt is impulsive and requires alarms for safety  Pt slid out of recliner, attempting to reach call bell, no injuries noted  This week we will encourage independence with ADLs  We will monitor lab results and vital signs  We will monitor wound for healing and s/s of infection  We will educate pt about craniectomy precautions and helmet  We will educate patient about repositioning to prevent skin breakdown and perform routine skin checks  We will monitor for adequate pain control  We will monitor for constipation and medicate as per orders    We will increase safety awareness with transfers and keep pt free from falls

## 2020-02-19 NOTE — PLAN OF CARE
Problem: Potential for Falls  Goal: Patient will remain free of falls  Description  INTERVENTIONS:  - Assess patient frequently for physical needs  -  Identify cognitive and physical deficits and behaviors that affect risk of falls    -  [place] fall precautions as indicated by assessment   - Educate patient/family on patient safety including physical limitations  - Instruct patient to call for assistance with activity based on assessment  - Modify environment to reduce risk of injury  - Consider OT/PT consult to assist with strengthening/mobility  Outcome: Progressing     Problem: PAIN - ADULT  Goal: Verbalizes/displays adequate comfort level or baseline comfort level  Description  Interventions:  - Encourage patient to monitor pain and request assistance  - Assess pain using appropriate pain scale  - Administer analgesics based on type and severity of pain and evaluate response  - Implement non-pharmacological measures as appropriate and evaluate response  - Consider cultural and social influences on pain and pain management  - Notify physician/advanced practitioner if interventions unsuccessful or patient reports new pain  Outcome: Progressing     Problem: INFECTION - ADULT  Goal: Absence or prevention of progression during hospitalization  Description  INTERVENTIONS:  - Assess and monitor for signs and symptoms of infection  - Monitor lab/diagnostic results  - Monitor all insertion sites, i e  indwelling lines, tubes, and drains  - Monitor endotracheal if appropriate and nasal secretions for changes in amount and color  - [place] appropriate cooling/warming therapies per order  - Administer medications as ordered  - Instruct and encourage patient and family to use good hand hygiene technique  - Identify and instruct in appropriate isolation precautions for identified infection/condition  Outcome: Progressing  Goal: Absence of fever/infection during neutropenic period  Description  INTERVENTIONS:  - Monitor WBC    Outcome: Progressing     Problem: SAFETY ADULT  Goal: Maintain or return to baseline ADL function  Description  INTERVENTIONS:  -  Assess patient's ability to carry out ADLs; assess patient's baseline for ADL function and identify physical deficits which impact ability to perform ADLs (bathing, care of mouth/teeth, toileting, grooming, dressing, etc )  - Assess/evaluate cause of self-care deficits   - Assess range of motion  - Assess patient's mobility; develop plan if impaired  - Assess patient's need for assistive devices and provide as appropriate  - Encourage maximum independence but intervene and supervise when necessary  - Involve family in performance of ADLs  - Assess for home care needs following discharge   - Consider OT consult to assist with ADL evaluation and planning for discharge  - Provide patient education as appropriate  Outcome: Progressing  Goal: Maintain or return mobility status to optimal level  Description  INTERVENTIONS:  - Assess patient's baseline mobility status (ambulation, transfers, stairs, etc )    - Identify cognitive and physical deficits and behaviors that affect mobility  - Identify mobility aids required to assist with transfers and/or ambulation (gait belt, sit-to-stand, lift, walker, cane, etc )  - [place] fall precautions as indicated by assessment  - Record patient progress and toleration of activity level on Mobility SBAR; progress patient to next Phase/Stage  - Instruct patient to call for assistance with activity based on assessment  - Consider rehabilitation consult to assist with strengthening/weightbearing, etc   Outcome: Progressing     Problem: DISCHARGE PLANNING  Goal: Discharge to home or other facility with appropriate resources  Description  INTERVENTIONS:  - Identify barriers to discharge w/patient and caregiver  - Arrange for needed discharge resources and transportation as appropriate  - Identify discharge learning needs (meds, wound care, etc )  - Arrange for interpretive services to assist at discharge as needed  - Refer to Case Management Department for coordinating discharge planning if the patient needs post-hospital services based on physician/advanced practitioner order or complex needs related to functional status, cognitive ability, or social support system  Outcome: Progressing     Problem: Prexisting or High Potential for Compromised Skin Integrity  Goal: Skin integrity is maintained or improved  Description  INTERVENTIONS:  - Identify patients at risk for skin breakdown  - Assess and monitor skin integrity  - Assess and monitor nutrition and hydration status  - Monitor labs   - Assess for incontinence   - Turn and reposition patient  - Assist with mobility/ambulation  - Relieve pressure over bony prominences  - Avoid friction and shearing  - Provide appropriate hygiene as needed including keeping skin clean and dry  - Evaluate need for skin moisturizer/barrier cream  - Collaborate with interdisciplinary team   - Patient/family teaching  - Consider wound care consult   Outcome: Progressing     Problem: Nutrition/Hydration-ADULT  Goal: Nutrient/Hydration intake appropriate for improving, restoring or maintaining nutritional needs  Description  Monitor and assess patient's nutrition/hydration status for malnutrition  Collaborate with interdisciplinary team and initiate plan and interventions as ordered  Monitor patient's weight and dietary intake as ordered or per policy  Utilize nutrition screening tool and intervene as necessary  Determine patient's food preferences and provide high-protein, high-caloric foods as appropriate       INTERVENTIONS:  - Monitor oral intake, urinary output, labs, and treatment plans  - Assess nutrition and hydration status and recommend course of action  - Evaluate amount of meals eaten  - Assist patient with eating if necessary   - Allow adequate time for meals  - Recommend/ encourage appropriate diets, oral nutritional supplements, and vitamin/mineral supplements  - Order, calculate, and assess calorie counts as needed  - Recommend, monitor, and adjust tube feedings and TPN/PPN based on assessed needs  - Assess need for intravenous fluids  - Provide specific nutrition/hydration education as appropriate  - Include patient/family/caregiver in decisions related to nutrition  Outcome: Progressing

## 2020-02-19 NOTE — TREATMENT PLAN
Individualized Plan of Anshul Stover 32 [de-identified] y o  female MRN: [id number]  Unit/Bed#: -01 Encounter: [id number]     PATIENT INFORMATION  ADMISSION DATE: 2/18/2020  2:07 PM HÉCTOR CATEGORY: Hemorrhagic CVA   ADMISSION DIAGNOSIS: CVA (cerebral vascular accident) ([facility] [address] ) [[id number]]  EXPECTED LOS: 10 - 14 days     MEDICAL/FUNCTIONAL PROGNOSIS  Based on my assessment of the patient's medical conditions and current functional status, the prognosis for attaining medical and functional goals or the IRF stay is:  Good    Medical Goals: Patient will be medically stable for [facility] to [facility] [place] upon completion of rehab program and Patient will be able to manage medical conditions and comorbid conditions with medications and follow up upon completion of rehab program    [address]: Home - Assistance     ANTICIPATED FOLLOW-UP SERVICE:   Outpatient Therapy Services: PT, OT and SLP         DISCIPLINE SPECIFIC PLANS:  Required Disciplines & Services: Rehabillitation Nursing and Case Management    REQUIRED THERAPY:  Therapy Hours per Day Days per Week Total Days   Physical Therapy 1 5-6 7   Occupational Therapy 1 5-6 7   Speech/Language Therapy 1 5-6 7         ANTICIPATED FUNCTIONAL OUTCOMES:  ADL: Patient will require supervision with ADLs with least restrictive device upon completion of rehab program   Bladder/Bowel:     Transfers: Patient will be independent with transfers with least restrictive device upon completion of rehab program   Locomotion: Patient will be independent with locomotion with least restrictive device upon completion of rehab program   Cognitive: Patient will require supervision for basic and complex tasks upon completion of rehab program     DISCHARGE PLANNING NEEDS  Equipment needs: Discharge needs to be reviewed with team      REHAB ANTICIPATED PARTICIPATION RESTRICTIONS:  None

## 2020-02-19 NOTE — PROGRESS NOTES
OT LTGs     02/19/20 0830   Rehab Team Goals   ADL Team Goal Patient will require supervision with ADLs with least restrictive device upon completion of rehab program   Rehab Team Interventions   OT Interventions Self Care;Home Management; Therapeutic Exercise;Cognitive Retraining;Energy Conservation;Patient/Family Education;Cognitive Reintegration;Community Reintegration   Eating Goal   Eating Goal 06  Independent - Patient completes the activity by him/herself with no assistance from a helper  Status Target goal - one week; Target goal - two weeks  (7-10 days)   Interventions Assistive Device; Compensation Strategies; Neuromuscular Education; Optimal Position   Grooming Goal   Oral Hygiene Goal 06  Independent - Patient completes the activity by him/herself with no assistance from a helper  Task Wash/Dry Face;Wash/Dry Hands;Brush Teeth;Comb Hair;Acquire Items; Initiate Task;Complete Groom   Environment Seated in Chair;Seated at [facility] [employer] sit   Status Target goal - one week; Target goal - two weeks  (7-10 days)   Intervention Assistive Device;Balance Work;Neuromuscular Education; Therapeutic Exercise; Tolerance Work   Tub/Shower Transfer Goal   Method Shower Stall  (GOAL: CS)   Assist Device Seat with Back;Grab Bar;Hand Held Shower   Status Target goal - one week; Target goal - two weeks  (7-10 days)   Interventions ADL Training;Assistive Device; Neuromuscular Education   Bathing Goal   Shower/bathe self Goal 04  Supervision or touching assistance- [place] provides VERBAL CUES or supervision throughout activity  Environment Seated;Standing;Sponge Bath; Shower   Adaptive Equipment Reacher;Seat with back;Grab [employer]   Status Target goal - one week; Target goal - two weeks  (7-10 days)   Intervention ADL Training;Assistive Device; Neuromuscular Education; Therapeutic Exercise   Upper Body Dressing Goal   Upper body dressing Goal 06   Independent - Patient completes the activity by him/herself with no assistance from a helper  Task Upper Body;Arms in/out; Over Head;Button Down; Fasteners;Bra   Environment Seated   Status Target goal - one week; Target goal - two weeks  (7-10 days)   Intervention Assistive Device;Balance Work;Neuromuscular Education; Therapeutic Exercise; Tolerance Work   Lower Body Dressing Goal   Lower body dressing Goal 06  Independent - Patient completes the activity by him/herself with no assistance from a helper  Putting on/taking off footwear Goal 06  Independent - Patient completes the activity by him/herself with no assistance from a helper  Task Lower Body;Shoe/Slipper;Socks;Pants; Undergarment; Fasteners   Adaptive Equipment Reacher;Sock Aide; Shoe Horn;Dressing Stick; Elastic Laces   Environment Seated;Standing   Status Target goal - one week; Target goal - two weeks  (7-10 days)   Intervention Assistive Device;Balance Work;Neuromuscular Education; Therapeutic Exercise; Tolerance Work   Toileting Transfer Goal   Toilet transfer Goal 06  Independent - Patient completes the activity by him/herself with no assistance from a helper  Assistive Device Xhale Toilet Seat   Status Target goal - one week; Target goal - two weeks  (7-10 days)   Intervention ADL Training;Balance Work;Assistive Device   Toileting Goal   Toileting hygiene Goal 06  Independent - Patient completes the activity by him/herself with no assistance from a helper  Task Pants Up;Pants Down;Hygiene   Safety Balance   Status Target goal - one week; Target goal - two weeks  (7-10 days)   Intervention ADL Training;Balance Work;Assistive Device   Meal Prep and Kitchen Mobility   Assist Level Supervision   Status Target goal - one week; Target goal - two weeks  (7-10 days)   Medication Management   Assist Level Supervision  (close)   Status Target goal - one week; Target goal - two weeks  (7-10 days)   Laundry   Assist Level Minimum Assist   Status Target goal - one week; Target goal - two weeks  (7-10 days)     Rachel Betancourt, MS, OTR/L

## 2020-02-19 NOTE — PROGRESS NOTES
PM&R Progress Note:    Subjective: Patient seen face to face after therapies resting in bed today  Doing very well  She has no complaints of headaches, nausea, vomiting  No acute issues  Review of Systems: Pertinent positives in subjective, all other ROS reviewed are negative  Objective:   /68 (BP Location: Right arm)   Pulse 74   Temp 98 °F (36 7 °C) (Oral)   Resp 18   Ht 5' 1" (1 549 m)   Wt 92 kg (202 lb 13 2 oz)   SpO2 95%   BMI 38 32 kg/m²     Physical Exam   Constitutional: She is oriented to person, place, and time  She appears well-developed and well-nourished  HENT:   Right scalp incision covered with staples, clean dry and intact  Eyes: Pupils are equal, round, and reactive to light  EOM are normal    Neck: Neck supple  Cardiovascular: Normal rate and regular rhythm  Pulmonary/Chest: Breath sounds normal  She has no wheezes  She has no rales  Abdominal: Soft  Bowel sounds are normal  She exhibits no distension  There is no tenderness  Musculoskeletal: Normal range of motion  She exhibits no edema  Neurological: She is alert and oriented to person, place, and time  Motor exam:  4+/5 throughout   Skin: Skin is warm  Psychiatric: She has a normal mood and affect  Nursing note and vitals reviewed  Assessment/Plan: Edwina Jovel is a [de-identified] y o  female with HTN, CHF, hypothyroidism, CKD 3, HLD who presented to the [address] on 2/10/20 with mental status changes, vomiting, and headache  She was found to have a 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  She required a craniectomy and evacuation performed by Dr Brenda Magaña on 2/11/20  Post-operatively patient completed 7 days of Keppra for seizure ppx  Maintained in a cranial helmet while OOB  Cleared for SQ Heparin for DVT ppx  Further work-up revealed no mass, ischemia or critical stenosis  It was recommended to repeat CTH in 1 month    Patient found to have functional deficits from her baseline  Admitted to [facility] on 2/18/20         Plan:   · Rehabilitation plan:  Continue acute inpatient rehabilitation with PT/OT/SLP  · 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 by Dr Haider Berg on 2/11/20  Cranial precautions  +Helmet while OOB  F/U CTH in 1 month 3/11/20  · HTN: managed on Norvasc, Cozaar  · CHF: combined - managed on Bumex    ECHO showed an EF of 65%  · GERD: managed on Protonix (therapeutic sub for omeprazole)  · Hypothyroidism: managed on Synthroid  · HLD: managed on Lipitor  · CKD 3: baseline Cr = 1 1 - 1 3  · DVT ppx: managed on SQ Heparin 5000 units Q8h (cleared by NSGY) and SCDs  · Internal Medicine consulted for medical comanagement  · Mild hypokalemia - repleted 40meq x 1  · Mild leukocytosis trending down  · Code Status: DNR/DNI          Lab Results   Component Value Date    WBC 10 98 (H) 02/18/2020    HGB 10 9 (L) 02/18/2020    HCT 34 3 (L) 02/18/2020    MCV 86 02/18/2020     02/18/2020     Lab Results   Component Value Date    SODIUM 139 02/18/2020    K 3 4 (L) 02/18/2020     02/18/2020    CO2 28 02/18/2020    BUN 17 02/18/2020    CREATININE 1 00 02/18/2020    GLUC 87 02/18/2020    CALCIUM 8 5 02/18/2020     Lab Results   Component Value Date    INR 1 13 02/10/2020    INR 1 01 03/09/2016    PROTIME 13 9 02/10/2020    PROTIME 10 7 03/09/2016           Current Facility-Administered Medications:     acetaminophen (TYLENOL) tablet 650 mg, 650 mg, Oral, Q6H PRN, Deann Treviño MD    amLODIPine (NORVASC) tablet 10 mg, 10 mg, Oral, Daily, Deann Treviño MD    atorvastatin (LIPITOR) tablet 40 mg, 40 mg, Oral, QPM, Deann Treviño MD, 40 mg at 02/18/20 1744    bisacodyl (DULCOLAX) rectal suppository 10 mg, 10 mg, Rectal, Daily PRN, Deann Treviño MD    bumetanide (BUMEX) tablet 2 mg, 2 mg, Oral, Daily, Deann Treviño MD    heparin (porcine) subcutaneous injection 5,000 Units, 5,000 Units, Subcutaneous, Q8H [address], Radha ROSENTHAL MD Carolyn, 5,000 Units at 02/19/20 7951    levothyroxine tablet 112 mcg, 112 mcg, Oral, Early Morning, Yaneli Costa MD, 112 mcg at 02/19/20 6984    losartan (COZAAR) tablet 25 mg, 25 mg, Oral, Daily, Yaneli Costa MD    melatonin tablet 3 mg, 3 mg, Oral, HS, Yaneli Costa MD, 3 mg at 02/18/20 2200    ondansetron (ZOFRAN-ODT) dispersible tablet 4 mg, 4 mg, Oral, Q6H PRN, Yaneli Costa MD    oxyCODONE (ROXICODONE) IR tablet 2 5 mg, 2 5 mg, Oral, Q6H PRN, Yaneli Costa MD    pantoprazole (PROTONIX) EC tablet 40 mg, 40 mg, Oral, Early Morning, Yaneli Costa MD, 40 mg at 02/19/20 0615    polyethylene glycol (MIRALAX) packet 17 g, 17 g, Oral, Daily PRN, Yaneli Costa MD    senna-docusate sodium (SENOKOT S) 8 6-50 mg per tablet 1 tablet, 1 tablet, Oral, BID, Yaneli Costa MD, 1 tablet at 02/18/20 1744    Past Medical History:   Diagnosis Date    Anxiety     Arthritis     joints    Cataract     Disease of thyroid gland     Eczema     GERD (gastroesophageal reflux disease)     Gout     Heart failure ([facility] [address] )     Hepatitis     Hiatal hernia     Hypertension     Onychomycosis     last assessed: 4/29/16    Orthopnea     resolved: 1/8/16    Sleep apnea     wears CPAP       Patient Active Problem List    Diagnosis Date Noted    Intraparenchymal hemorrhage of brain ([facility] [address] ) 02/10/2020    Peripheral arteriosclerosis ([facility] [address] ) 06/21/2019    Osteoporosis screening 03/08/2019    Eczema 12/03/2018    SOB (shortness of breath) 10/26/2018    Radiculopathy of lumbar region 10/02/2018    Corns 10/02/2018    Gastroesophageal reflux disease without esophagitis 06/04/2018    Iron deficiency anemia secondary to inadequate dietary iron intake 06/04/2018    Pain in both feet 03/15/2018    Alkaline phosphatase elevation 03/12/2018    Lumbar disc herniation with radiculopathy 12/08/2017    Hyperlipemia 11/15/2017    Chronic low back pain 10/24/2017    Hyperuricemia 06/23/2017    Pseudogout of left wrist 06/23/2017    Nephrolithiasis 06/21/2017    Acute on chronic diastolic congestive heart failure ([facility] ) 06/02/2017    Chronic venous insufficiency 06/02/2017    Elevated triglycerides with high cholesterol 06/02/2017    CKD (chronic kidney disease), stage III ([place] [address] ) 05/19/2017    Benign essential hypertension 08/22/2016    Chronic diastolic (congestive) heart failure ([place] [address] ) 08/22/2016    Hypothyroidism 08/22/2016    Morbid obesity due to excess calories ([place] [address] ) 08/20/2016    Diverticulitis of colon 08/18/2016    WEDNI (obstructive sleep apnea) 05/20/2016    Onychomycosis 04/29/2016    Morbid obesity with body mass index of 40 0-44 9 in adult [facility]) 03/10/2016    Knee pain 05/22/2015    Tarsal tunnel syndrome 06/24/2014    Difficulty in walking 03/24/2014    Plantar fascial fibromatosis 65/85/6301    Umbilical hernia 32/99/4938    Atherosclerosis of arteries of extremities (HCC) 08/28/2013    Hallux abductovalgus with bunions, unspecified laterality 07/26/2013    Chronic gout without tophus 07/02/2013    Arthropathy of knee 09/04/2012    Rupture of popliteal cyst 09/04/2012    Anxiety 09/04/2012    Hiatal hernia 08/27/2012          Rocio Mcwilliams MD  PM&R Attending

## 2020-02-19 NOTE — PROGRESS NOTES
Occupational Therapy Initial Evaluation     02/19/20 9863   Patient Data   Date of Onset 02/10/20   Home Setup   Type of Home Single Level   Method of Entry Hand Rail Bilateral;Stairs   Number of Stairs 1   Number of Stairs in Home 0   First Floor Bathroom Full; Shower;Combo;Grab Bars  (suction cup grab bars)   First Floor Bathroom Accessibility Grab bars in tub/shower; Shower chair;Tub bench  (suction cup grab bars)   First Floor Setup Available Yes   Home Modifications Necessary? No   Available Equipment Flight Steward; Shower Chair; Long Handled Adaptive Equipment;Rollator  (lift chair)   Baseline Information   Transportation ; Family/friends drive  (drives short distances)   Prior Device(s) Used Shower Chair;Rollator   Prior IADL Participation   Money Management Identify Money;Estimate Costs;Estimate Change;Combine Bills   Meal Preparation Full Participation   Laundry Partial Participation  (shares w/ DIL)   Home Cleaning Cleaning service  (family and cleaning service assists)   Prior Level of Function   Self-Care 3  Independent - Patient completed the activities by him/herself, with or without an assistive device, with no assistance from a helper  Indoor-Mobility (Ambulation) 3  Independent - Patient completed the activities by him/herself, with or without an assistive device, with no assistance from a helper  Stairs 3  Independent - Patient completed the activities by him/herself, with or without an assistive device, with no assistance from a helper  Functional Cognition 3  Independent - Patient completed the activities by him/herself, with or without an assistive device, with no assistance from a helper  Prior Assistance Needed for Household Chores/Cleaning; Shopping   Prior Device Used D   Shahnaz Pipe in the Last Year   Number of falls in the past 12 months 1  (July 2019)   Type of Injury Associated with Fall Injury  (L knee)   Psychosocial   Psychosocial (WDL) WDL Patient Behaviors/Mood Appropriate for situation;Brightens with approach;Pleasant; Cooperative   Restrictions/Precautions   Precautions Bed/chair alarms;Cognitive; Fall Risk;Supervision on toilet/commode;Seizure   Weight Bearing Restrictions No   ROM Restrictions No   Braces or Orthoses Craniectomy Helmet   Pain Assessment   Pain Assessment No/denies pain   Pain Score No Pain   Eating Assessment   Type of Assistance Needed Set-up / clean-up   Amount of Physical Assistance Provided No physical assistance   Eating CARE Score 5   Oral Hygiene   Type of Assistance Needed Physical assistance   Amount of Physical Assistance Provided Less than 25%   Comment Min A to steady while in stance at sink  Oral Hygiene CARE Score 3   Grooming   Able To Comb/Brush Hair;Wash/Dry Face;Wash/Dry Hands;Brush/Clean Teeth; Initiate Tasks   Limitation Noted In Timeliness;Strength; Safety   Findings CGA-Min A to steady while in stance at sink  Tub/Shower Transfer   Reason Not Assessed Sponge Bath;Medical  (Pt without shower orders )   Shower/Bathe Self   Type of Assistance Needed Physical assistance   Amount of Physical Assistance Provided 25%-49%   Comment Pt bathed UB while seated, Min A to steady while in stance to bathe jimmie and buttocks  A to bathe lower legs, pt reports LH sponge at baseline  Shower/Bathe Self CARE Score 3   Bathing   Assessed Bath Style Sponge Bath   Anticipated D/C Bath Style   (pending recommendation of MD)   Dressing/Undressing Clothing   Type of Assistance Needed Physical assistance   Amount of Physical Assistance Provided Less than 25%   Comment completed while seated, A to protect crani incision  Upper Body Dressing CARE Score 3   Type of Assistance Needed Physical assistance   Amount of Physical Assistance Provided Less than 25%   Comment Able to thread BLE while seated, Partial A to steady while in stance for clothing management     Lower Body Dressing CARE Score 3   Putting On/Taking Off Footwear   Type of Assistance Needed Physical assistance   Amount of Physical Assistance Provided 50%-74%   Comment A req to don/doff socks, able to don slip on shoes  Putting On/Taking Off Footwear CARE Score 2   Toileting Hygiene   Type of Assistance Needed Physical assistance   Amount of Physical Assistance Provided Less than 25%   Comment Partial A to steady while in stance for bowel and bladder hygiene  Toileting Hygiene CARE Score 3   Toilet Transfer   Type of Assistance Needed Physical assistance   Amount of Physical Assistance Provided Less than 25%   Comment Partial A to steady w/ vc's for proper hand placement on grab bars  Toilet Transfer CARE Score 3   Transfer Bed/Chair/Wheelchair   Type of Assistance Needed Physical assistance   Amount of Physical Assistance Provided Less than 25%   Chair/Bed-to-Chair Transfer CARE Score 3   Sit to Stand   Type of Assistance Needed Physical assistance   Amount of Physical Assistance Provided Less than 25%   Comment vc's for proper hand placement and safety w/ RW   Sit to Stand CARE Score 3   Comprehension   QI: Comprehension 3  Usually Understands: Understands most conversations, but misses some part/intent of message  Requires cues at times to understand  Expression   QI: Expression 4  Express complex messages without difficulty and with speech that is clear and easy to understan   RUE Assessment   RUE Assessment WFL   LUE Assessment   LUE Assessment WFL   Coordination   Movements are Fluid and Coordinated 1   Sensation   Light Touch No apparent deficits   Sharp/Dull No apparent deficits   Cognition   Overall Cognitive Status Impaired   Arousal/Participation Alert; Cooperative   Attention Attends with cues to redirect   Orientation Level Oriented X4;Oriented to person;Oriented to place;Oriented to time;Oriented to situation   Memory Decreased short term memory   Following Commands Follows multistep commands with increased time or repetition   Comments Noted w/ poor attention to task, impulsivity at times, impaired safety awareness, impaired insight, and highly tangential w/ cues to redirect  Plan to complete formalized cognitive assessment to assist w/ safe d/c plan  Vision   Vision Comments Glasses for near sight at baseline   Discharge [address] Retired/not working   Patient's Discharge Plan home w/ family support, 24 hour supervision available   Patient's Rehab Expectations "To get stronger so I can go home and do everything on my own "   Barriers to Discharge Home Decreased Cognitive Function;Decreased Strength;Decreased Endurance; Safety Considerations   Impressions Pt is an [de-identified] y/o female presenting to [facility] on 2/10/20 w/ AMS, vomiting, and headache  Pt found to have large R parietal/temporal intraparenchymal hemorrhage w/ mass effect requiring craniectomy and evacuation performed on 2/11  Cranial helmet AAT when OOB  PMH includes obesity, lumbar disc herniation, CHF, Chronic LBP, Diverticulitis, WENDI, Eczema, anxiety, and CKD-3  Pt reports completing ADLs and fxnl mobility Independently w/ rollator, shares IADLs, (+) driving short distances  Pt is currently functioning at overall Min A for ADLs and  for fxnl mobility w/ RW  Pt is limited by dec strength, dec endurance, impaired balance, impaired insight, poor attention to task, and ADL dysfunction  Pt will benefit from skilled OT services w/ focus on above barriers, assess need for DME, provide pt/family edu, and maximize fxnl indep to return to PLOF in 7-10 day ELOS to meet overall S goals  Please complete MOCA next OT session     OT Therapy Minutes   OT Time In 0830   OT Time Out 1000   OT Total Time (minutes) 90   OT Mode of treatment - Individual (minutes) 90   OT Mode of treatment - Concurrent (minutes) 0   OT Mode of treatment - Group (minutes) 0   OT Mode of treatment - Co-treat (minutes) 0   OT Mode of Treatment - Total time(minutes) 90 minutes   OT Cumulative Minutes 90   Cumulative Minutes   Cumulative therapy minutes 90     Linda Noguera MS, OTR/L

## 2020-02-19 NOTE — PROGRESS NOTES
SLP TAA     02/19/20 1300   Patient Data   Rehab Impairment Impairment of mobility, safety, Activities of Daily Living (ADLs), and cognitive/communication skills due to Stroke:    No paresis   Etiologic Diagnosis Right Temporal Intracranial Hemorrhage   Date of Onset 02/10/20   Support System   Name Patient lives at home with her son, LUDWIG and grandson  Son and LUDWIG work however on Wednesdays, son works from home  Grandson able to provide S/A at home as he does not currently work    Able to provide 24 hour supervision Yes   Baseline Information   Transportation ; Family/friends drive  (pt drives short distances)   Prior IADL Participation   Money Management Identify Money;Estimate Costs;Estimate Change;Combine Bills   Meal Preparation Full Participation   Laundry Partial Participation  (shares with DIL)   Home Cleaning Cleaning service  (family and cleaning service assist)   Prior Level of Function   Functional Cognition 3  Independent - Patient completed the activities by him/herself, with or without an assistive device, with no assistance from a helper  Restrictions/Precautions   Precautions Bed/chair alarms;Cognitive; Fall Risk;Supervision on toilet/commode;Seizure   Braces or Orthoses Craniectomy Helmet   Pain Assessment   Pain Assessment No/denies pain   Pain Score No Pain   Comprehension   Auditory Basic   Visual Basic   Findings Cognitive linguistic evaluation was initiated this session  See SLP Rehab note for full details  QI: Comprehension 3  Usually Understands: Understands most conversations, but misses some part/intent of message  Requires cues at times to understand  Comprehension (FIM) 4 - Understands basic info/conversation 75-90% of time   Expression   Verbal Basic   Non-Verbal Basic   Intelligibility Sentence   Findings Cognitive linguistic evaluation was initiated this session  See SLP Rehab note for full details  QI: Expression 4   Express complex messages without difficulty and with speech that is clear and easy to [place]   Expression (FIM) 5 - Needs help/cues only RARELY (< 10% of the time)   Social Interaction   Cooperation with staff   Participation Individual   Behaviors observed Appropriate   Findings Pt was cooperative and engaged throughout session  Social Interaction (FIM) 5 - Requires redirection but less than 10% of the time  Problem Solving   Routine Manages call bell   Findings Cognitive linguistic evaluation was initiated this session  See SLP Rehab note for full details  Problem solving (FIM) 3 - Solves basic problmes 50-74% of time   Memory   Recognize People Yes   Initiates Tasks Yes   Short-Term Impaired   Long Term Intact   Findings Cognitive linguistic evaluation was initiated this session  See SLP Rehab note for full details  Memory (FIM) 3 - Recognizes, recalls/performs 50-74%   Cognition   Overall Cognitive Status Impaired   Arousal/Participation Alert; Cooperative   Attention Difficulty dividing attention   Orientation Level Oriented X4   Memory Decreased short term memory;Decreased recall of recent events   Following Commands Follows one step commands with increased time or repetition   Comments Cognitive linguistic evaluation was initiated this session  See SLP Rehab note for full details  Discharge Information   Vocational Plan Retired/not working   Patient's Discharge Plan home w/ family support, 24 hour supervision available   Patient's Rehab Expectations "To get stronger so I can go home and do everything on my own "   Barriers to Discharge Home Decreased Cognitive Function;Decreased Strength;Decreased Endurance; Safety Considerations   Impressions Cognitive linguistic evaluation was initiated this session where pt demonstrating functional basic level comprehension and expression, however, pt most notably limited by deficits in attention and working memory where pt was significantly tangential and verbose   Pt also presenting w/ decreased insight into deficits and problem solving, impacting safety awareness  Pt presents with good rehab potential to achieve min assist to supervision level for cognition at time of discharge  Due to current cognitive linguistic deficits, recommending skilled ST services at this time to maximize functional cognitive linguistic skills in order to improve level of independence and safety  Plan to complete remainder of CLQT+ in follow up session to gain additional insight into pt's current deficits and to further assist in guiding treatment plan      SLP Therapy Minutes   SLP Time In 1300   SLP Time Out 1330   SLP Total Time (minutes) 30   SLP Mode of treatment - Individual (minutes) 30   SLP Mode of treatment - Concurrent (minutes) 0   SLP Mode of treatment - Group (minutes) 0   SLP Mode of treatment - Co-treat (minutes) 0   SLP Mode of Treatment - Total time(minutes) 30 minutes   SLP Cumulative Minutes 30   Cumulative Minutes   Cumulative therapy minutes 120

## 2020-02-19 NOTE — PROGRESS NOTES
SLP Cognitive Linguistic Evaluation Initiated     02/19/20 1300   Pain Assessment   Pain Assessment No/denies pain   Pain Score No Pain   Restrictions/Precautions   Precautions Bed/chair alarms;Cognitive; Fall Risk;Supervision on toilet/commode;Seizure   Braces or Orthoses Craniectomy Helmet   Cognitive Linguisitic Assessments   Cognitive Linquistic Quick Test (CLQT) Pt initiated CLQT+ assessment, however, only portions were able to be completed this session due to time constraints  Plan to complete remainder of assessment in follow up session  Of tasks completed at this time, pt scored at or above criterion at time of assessment in comparison to age matched peers ranging from 65-79 y/o for 2/3 tasks completed  Of note, pt was significantly tangential and verbose this session, impacting ability to maintain attention during tasks where she required frequent redirections  Comprehension   Auditory Basic   Visual Basic   Findings Cognitive linguistic evaluation was initiated this session  See SLP Rehab note for full details  QI: Comprehension 3  Usually Understands: Understands most conversations, but misses some part/intent of message  Requires cues at times to understand  Comprehension (FIM) 4 - Understands basic info/conversation 75-90% of time   Expression   Verbal Basic   Non-Verbal Basic   Intelligibility Sentence   Findings Cognitive linguistic evaluation was initiated this session  See SLP Rehab note for full details  QI: Expression 4  Express complex messages without difficulty and with speech that is clear and easy to [place]   Expression (FIM) 5 - Needs help/cues only RARELY (< 10% of the time)   Social Interaction   Cooperation with staff   Participation Individual   Behaviors observed Appropriate   Findings Pt was cooperative and engaged throughout session  Social Interaction (FIM) 5 - Requires redirection but less than 10% of the time     Problem Solving   Routine Manages call bell   Findings Cognitive linguistic evaluation was initiated this session  See SLP Rehab note for full details  Problem solving (FIM) 3 - Solves basic problmes 50-74% of time   Memory   Recognize People Yes   Initiates Tasks Yes   Short-Term Impaired   Long Term Intact   Findings Cognitive linguistic evaluation was initiated this session  See SLP Rehab note for full details  Memory (FIM) 3 - Recognizes, recalls/performs 50-74%   Executive Function Skills   Problem Solving X   Simple Functional Tasks Minimal   Complex Functional Tasks To be assessed in therapy   Safety/Judgement X   Routine Tasks To be assessed in therapy   Novel Situations To be assessed in therapy   Complex Functional Tasks To be assessed in therapy   Unable to Self-monitor and Self-correct Consistently Minimal   Insight   (impaired)   Flexibility of Thought Reduced flexibility   Planning Reduced planning skills   Organization Moderately disorganized   Memory Skills   Orientation Level Oriented X4   Long Term Biographical Recall WFL - Within Functional Limits   Short Term Recent Recall Mild Impairment   Short Term Recall of Paragraph Mild Impairment   Short Term Working Recall Moderate Impairment   Auditory Comprehension   Word Level Comprehension WFL   Yes/No Questions WFL   Comphrehends Conversation Simple   Interfering Components Working memory; Attention - divided; Attention - sustained; Attention to detail   Speech/Language/Cognition Assessmetn   Treatment Assessment Cognitive linguistic evaluation was initiated this session consisting of patient interview, informal assessment and portions of the CLQT+  Pt oriented x4 and demonstrating functional LTM recall of biographical info  Throughout assessment, pt demonstrating functional basic level comprehension and expression, however, pt most notably limited by deficits in attention and working memory where pt was significantly tangential and verbose   Pt requiring consistent redirections throughout session, including during both pt interview and during structured CLQT+ tasks  Due to pt's deficits in attention and tangential/verbose speech, ability to complete CLQT+ tasks was limited  Pt also presenting w/ decreased insight into deficits and problem solving, despite SLP providing education in current diagnosis and potential impact on cognition  Current cognitive linguistic deficits also impact safety awareness  Pt reports that her grandson will be able to provide 24/hr supervision following discharge from unit, as well as her son and daughter in law when they return home from work  Due to current cognitive linguistic deficits, recommending skilled ST services at this time to maximize functional cognitive linguistic skills in order to improve level of independence and safety  Plan to complete remainder of CLQT+ in follow up session to gain additional insight into pt's current deficits and to further assist in guiding treatment plan  SLP Therapy Minutes   SLP Time In 1300   SLP Time Out 1330   SLP Total Time (minutes) 30   SLP Mode of treatment - Individual (minutes) 30   SLP Mode of treatment - Concurrent (minutes) 0   SLP Mode of treatment - Group (minutes) 0   SLP Mode of treatment - Co-treat (minutes) 0   SLP Mode of Treatment - Total time(minutes) 30 minutes   SLP Cumulative Minutes 30   Therapy Time missed   Time missed?  No

## 2020-02-20 LAB
ANION GAP SERPL CALCULATED.3IONS-SCNC: 7 MMOL/L (ref 4–13)
BUN SERPL-MCNC: 15 MG/DL (ref 5–25)
CALCIUM SERPL-MCNC: 9.2 MG/DL (ref 8.3–10.1)
CHLORIDE SERPL-SCNC: 105 MMOL/L (ref 100–108)
CO2 SERPL-SCNC: 28 MMOL/L (ref 21–32)
CREAT SERPL-MCNC: 1.03 MG/DL (ref 0.6–1.3)
ERYTHROCYTE [DISTWIDTH] IN BLOOD BY AUTOMATED COUNT: 16 % (ref 11.6–15.1)
GFR SERPL CREATININE-BSD FRML MDRD: 51 ML/MIN/1.73SQ M
GLUCOSE P FAST SERPL-MCNC: 88 MG/DL (ref 65–99)
GLUCOSE SERPL-MCNC: 88 MG/DL (ref 65–140)
HCT VFR BLD AUTO: 36.6 % (ref 34.8–46.1)
HGB BLD-MCNC: 11.3 G/DL (ref 11.5–15.4)
MCH RBC QN AUTO: 27.2 PG (ref 26.8–34.3)
MCHC RBC AUTO-ENTMCNC: 30.9 G/DL (ref 31.4–37.4)
MCV RBC AUTO: 88 FL (ref 82–98)
NRBC BLD AUTO-RTO: 0 /100 WBCS
PLATELET # BLD AUTO: 195 THOUSANDS/UL (ref 149–390)
PMV BLD AUTO: 11.9 FL (ref 8.9–12.7)
POTASSIUM SERPL-SCNC: 3.7 MMOL/L (ref 3.5–5.3)
RBC # BLD AUTO: 4.16 MILLION/UL (ref 3.81–5.12)
SODIUM SERPL-SCNC: 140 MMOL/L (ref 136–145)
WBC # BLD AUTO: 10.28 THOUSAND/UL (ref 4.31–10.16)

## 2020-02-20 PROCEDURE — 99233 SBSQ HOSP IP/OBS HIGH 50: CPT | Performed by: PHYSICAL MEDICINE & REHABILITATION

## 2020-02-20 PROCEDURE — 97112 NEUROMUSCULAR REEDUCATION: CPT

## 2020-02-20 PROCEDURE — 97129 THER IVNTJ 1ST 15 MIN: CPT

## 2020-02-20 PROCEDURE — 97110 THERAPEUTIC EXERCISES: CPT

## 2020-02-20 PROCEDURE — 97130 THER IVNTJ EA ADDL 15 MIN: CPT

## 2020-02-20 PROCEDURE — 85027 COMPLETE CBC AUTOMATED: CPT | Performed by: NURSE PRACTITIONER

## 2020-02-20 PROCEDURE — 97530 THERAPEUTIC ACTIVITIES: CPT

## 2020-02-20 PROCEDURE — 80048 BASIC METABOLIC PNL TOTAL CA: CPT | Performed by: NURSE PRACTITIONER

## 2020-02-20 RX ADMIN — LEVOTHYROXINE SODIUM 112 MCG: 112 TABLET ORAL at 05:38

## 2020-02-20 RX ADMIN — BUMETANIDE 2 MG: 2 TABLET ORAL at 08:06

## 2020-02-20 RX ADMIN — HEPARIN SODIUM 5000 UNITS: 5000 INJECTION INTRAVENOUS; SUBCUTANEOUS at 05:38

## 2020-02-20 RX ADMIN — HEPARIN SODIUM 5000 UNITS: 5000 INJECTION INTRAVENOUS; SUBCUTANEOUS at 21:35

## 2020-02-20 RX ADMIN — LOSARTAN POTASSIUM 25 MG: 25 TABLET, FILM COATED ORAL at 08:05

## 2020-02-20 RX ADMIN — ATORVASTATIN CALCIUM 40 MG: 40 TABLET, FILM COATED ORAL at 17:19

## 2020-02-20 RX ADMIN — AMLODIPINE BESYLATE 10 MG: 10 TABLET ORAL at 08:05

## 2020-02-20 RX ADMIN — PANTOPRAZOLE SODIUM 40 MG: 40 TABLET, DELAYED RELEASE ORAL at 05:38

## 2020-02-20 RX ADMIN — HEPARIN SODIUM 5000 UNITS: 5000 INJECTION INTRAVENOUS; SUBCUTANEOUS at 14:40

## 2020-02-20 RX ADMIN — MELATONIN 3 MG: at 21:35

## 2020-02-20 RX ADMIN — SENNOSIDES AND DOCUSATE SODIUM 1 TABLET: 8.6; 5 TABLET ORAL at 08:05

## 2020-02-20 RX ADMIN — SENNOSIDES AND DOCUSATE SODIUM 1 TABLET: 8.6; 5 TABLET ORAL at 17:19

## 2020-02-20 NOTE — PROGRESS NOTES
PM&R Progress Note:    Subjective: Patient seen face to face in SLP today  Doing well  No acute issues  Denies headache  Reports her son is bringing in her CPAP machine for WENDI - unclear if this will be rubbing against her incision, but will see her unit and appartus when arrives - until then discussed nocturnal oxygen  Review of Systems:   Review of Systems   Constitutional: Negative  HENT: Negative  Eyes: Negative  Respiratory: Negative  Cardiovascular: Negative  Gastrointestinal: Negative  Endocrine: Negative  Genitourinary: Negative  Musculoskeletal: Negative  Skin: Negative  Allergic/Immunologic: Negative  Neurological: Negative  Hematological: Negative  Psychiatric/Behavioral: Negative  Objective:   /63   Pulse 77   Temp 98 °F (36 7 °C) (Oral)   Resp 18   Ht 5' 1" (1 549 m)   Wt 92 kg (202 lb 13 2 oz)   SpO2 97%   BMI 38 32 kg/m²     Physical Exam   Constitutional: She is oriented to person, place, and time  She appears well-developed and well-nourished  HENT:   Right scalp incision covered with staples, clean dry and intact  Eyes: Pupils are equal, round, and reactive to light  EOM are normal    Neck: Neck supple  Cardiovascular: Normal rate and regular rhythm  Pulmonary/Chest: Effort normal and breath sounds normal  She has no wheezes  She has no rales  Abdominal: Soft  Bowel sounds are normal  She exhibits no distension  There is no tenderness  Musculoskeletal: Normal range of motion  She exhibits no edema  Neurological: She is alert and oriented to person, place, and time  No cranial nerve deficit or sensory deficit  Skin: Skin is warm  Psychiatric: She has a normal mood and affect  Nursing note and vitals reviewed        Assessment/Plan: Jose Daniel Tejada is a [de-identified] y o  female with HTN, CHF, hypothyroidism, CKD 3, HLD who presented to the [facility] on 2/10/20 with mental status changes, vomiting, and headache  She was found to have a 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  She required a craniectomy and evacuation performed by Dr Severiano Pesa on 2/11/20  Post-operatively patient completed 7 days of Keppra for seizure ppx  Maintained in a cranial helmet while OOB  Cleared for SQ Heparin for DVT ppx  Further work-up revealed no mass, ischemia or critical stenosis  It was recommended to repeat CTH in 1 month  Patient found to have functional deficits from her baseline  Admitted to [address] [place] [address] on 2/18/20         Plan:   · Rehabilitation plan:  Personally attended and discussed her functional and medical updates in weekly team conference  Min A ADLs and ambulation  Mod A stairs and transfers  Supervision for comprehensiond  Memory and problem solving are Mod A   ELOS 2 weeks  Continue current plan with PT/OT/SLP  Physical Therapy Occupational Therapy Speech Therapy   Weight Bearing Status: Full Weight Bearing  Transfers: Minimal Assistance  Bed Mobility: Minimal Assistance  Amulation Distance (ft): 120 feet  Ambulation: Minimal Assistance  Assistive Device for Ambulation: Roller Walker  Number of Stairs: 2  Assistive Device for Stairs: Bilateral Hand Rails  Stair Assistance:  Moderate Assistance  Ramp: Moderate Assistance  Assistive Device for Ramp: Roller Walker   Eating: Independent  Grooming: Incidental Touching  Bathing: Minimal Assistance  Bathing: Minimal Assistance  Upper Body Dressing: Minimal Assistance  Lower Body Dressing: Minimal Assistance  Toileting: Minimal Assistance  Toilet Transfer: Minimal Assistance  Cognition: Exceptions to WNL  Cognition: Decreased Attention, Decreased Safety, Decreased Executive Functions  Orientation: Person, Place, Time, Situation   Mode of Communication: Verbal  Cognition: Exceptions to WNL  Cognition: Decreased Memory, Decreased Executive Functions, Decreased Attention, Decreased Safety  Orientation: Person, Place, Time, Situation  Discharge Recommendations: Home with:  [address] with[de-identified] 24 Hour Supervision, Family Support(continued ST services pending pt progress)       · 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 by Dr Vonnie Nunez on 2/11/20  Cranial precautions  +Helmet while OOB  F/U CTH in 1 month 3/11/20  · HTN: managed on Norvasc, Cozaar  · CHF: combined - managed on Bumex    ECHO showed an EF of 65%  · GERD: managed on Protonix (therapeutic sub for omeprazole)  · Hypothyroidism: managed on Synthroid  · HLD: managed on Lipitor  · CKD 3: baseline Cr = 1 1 - 1 3  · DVT ppx: managed on SQ Heparin 5000 units Q8h (cleared by NSGY) and SCDs  · Internal Medicine consulted for medical comanagement  · Mild leukocytosis trending down  · Code Status: DNR/DNI  · Disposition: Reteam - Likely for discharge next week       Lab Results   Component Value Date    WBC 10 28 (H) 02/20/2020    HGB 11 3 (L) 02/20/2020    HCT 36 6 02/20/2020    MCV 88 02/20/2020     02/20/2020     Lab Results   Component Value Date    SODIUM 140 02/20/2020    K 3 7 02/20/2020     02/20/2020    CO2 28 02/20/2020    BUN 15 02/20/2020    CREATININE 1 03 02/20/2020    GLUC 88 02/20/2020    CALCIUM 9 2 02/20/2020     Lab Results   Component Value Date    INR 1 13 02/10/2020    INR 1 01 03/09/2016    PROTIME 13 9 02/10/2020    PROTIME 10 7 03/09/2016           Current Facility-Administered Medications:     acetaminophen (TYLENOL) tablet 650 mg, 650 mg, Oral, Q6H PRN, Shannan Pizarro MD    amLODIPine (NORVASC) tablet 10 mg, 10 mg, Oral, Daily, Shannan Pizarro MD, 10 mg at 02/20/20 0805    atorvastatin (LIPITOR) tablet 40 mg, 40 mg, Oral, QPM, Shannan Pizarro MD, 40 mg at 02/19/20 1702    bisacodyl (DULCOLAX) rectal suppository 10 mg, 10 mg, Rectal, Daily PRN, Shannan Pizarro MD    bumetanide (BUMEX) tablet 2 mg, 2 mg, Oral, Daily, Shannan Pizarro MD, 2 mg at 02/20/20 0806    heparin (porcine) subcutaneous injection 5,000 Units, 5,000 Units, Subcutaneous, Q8H [address], César Collins MD, 5,000 Units at 02/20/20 0538    levothyroxine tablet 112 mcg, 112 mcg, Oral, Early Morning, César Collins MD, 112 mcg at 02/20/20 0538    losartan (COZAAR) tablet 25 mg, 25 mg, Oral, Daily, César Collins MD, 25 mg at 02/20/20 0805    melatonin tablet 3 mg, 3 mg, Oral, HS, César Collins MD, 3 mg at 02/19/20 2133    ondansetron (ZOFRAN-ODT) dispersible tablet 4 mg, 4 mg, Oral, Q6H PRN, César Collins MD    oxyCODONE (ROXICODONE) IR tablet 2 5 mg, 2 5 mg, Oral, Q6H PRN, César Collins MD    pantoprazole (PROTONIX) EC tablet 40 mg, 40 mg, Oral, Early Morning, César Collins MD, 40 mg at 02/20/20 0538    polyethylene glycol (MIRALAX) packet 17 g, 17 g, Oral, Daily PRN, César Collins MD    senna-docusate sodium (SENOKOT S) 8 6-50 mg per tablet 1 tablet, 1 tablet, Oral, BID, César Collins MD, 1 tablet at 02/20/20 0805    Past Medical History:   Diagnosis Date    Anxiety     Arthritis     joints    Cataract     Disease of thyroid gland     Eczema     GERD (gastroesophageal reflux disease)     Gout     Heart failure ([facility] )     Hepatitis     Hiatal hernia     Hypertension     Onychomycosis     last assessed: 4/29/16    Orthopnea     resolved: 1/8/16    Sleep apnea     wears CPAP       Patient Active Problem List    Diagnosis Date Noted    Intraparenchymal hemorrhage of brain ([facility] ) 02/10/2020    Peripheral arteriosclerosis ([facility] ) 06/21/2019    Osteoporosis screening 03/08/2019    Eczema 12/03/2018    SOB (shortness of breath) 10/26/2018    Radiculopathy of lumbar region 10/02/2018    Corns 10/02/2018    Gastroesophageal reflux disease without esophagitis 06/04/2018    Iron deficiency anemia secondary to inadequate dietary iron intake 06/04/2018    Pain in both feet 03/15/2018    Alkaline phosphatase elevation 03/12/2018    Lumbar disc herniation with radiculopathy 12/08/2017    Hyperlipemia 11/15/2017    Chronic low back pain 10/24/2017    Hyperuricemia 06/23/2017    Pseudogout of left wrist 06/23/2017    Nephrolithiasis 06/21/2017    Acute on chronic diastolic congestive heart failure [facility] ) 06/02/2017    Chronic venous insufficiency 06/02/2017    Elevated triglycerides with high cholesterol 06/02/2017    CKD (chronic kidney disease), stage III (HCC) 05/19/2017    Benign essential hypertension 08/22/2016    Chronic diastolic (congestive) heart failure ([facility] ) 08/22/2016    Hypothyroidism 08/22/2016    Morbid obesity due to excess calories ([facility] ) 08/20/2016    Diverticulitis of colon 08/18/2016    WENDI (obstructive sleep apnea) 05/20/2016    Onychomycosis 04/29/2016    Morbid obesity with body mass index of 40 0-44 9 in adult [facility]) 03/10/2016    Knee pain 05/22/2015    Tarsal tunnel syndrome 06/24/2014    Difficulty in walking 03/24/2014    Plantar fascial fibromatosis 33/73/6063    Umbilical hernia 77/12/1419    Atherosclerosis of arteries of extremities ([facility] ) 08/28/2013    Hallux abductovalgus with bunions, unspecified laterality 07/26/2013    Chronic gout without tophus 07/02/2013    Arthropathy of knee 09/04/2012    Rupture of popliteal cyst 09/04/2012    Anxiety 09/04/2012    Hiatal hernia 08/27/2012        I have spent 45 minutes with Patient  today in which greater than 50% of this time was spent in counseling/coordination of care regarding Impressions and team conference        Wellington Patten MD  PM&R Attending

## 2020-02-20 NOTE — PROGRESS NOTES
02/20/20 1000   Pain Assessment   Pain Assessment No/denies pain   Pain Score No Pain   Restrictions/Precautions   Precautions Bed/chair alarms;Cognitive; Fall Risk;Impulsive;Seizure  (CRANI PREC; HELMET; KEEP HOB ELEVATED >30 AT ALL TIMES)   Weight Bearing Restrictions No   ROM Restrictions No   Braces or Orthoses Craniectomy Helmet   Sit to Stand   Type of Assistance Needed Incidental touching   Amount of Physical Assistance Provided No physical assistance   Sit to Stand CARE Score 4   Bed-Chair Transfer   Type of Assistance Needed Incidental touching   Amount of Physical Assistance Provided No physical assistance   Comment does attempt to perform transfer without RW req repetitive cuing to ensure safety    Chair/Bed-to-Chair Transfer CARE Score 4   Toileting Hygiene   Type of Assistance Needed Supervision   Amount of Physical Assistance Provided No physical assistance   Toileting Hygiene CARE Score 4   Toilet Transfer   Type of Assistance Needed Incidental touching   Amount of Physical Assistance Provided No physical assistance   Toilet Transfer CARE Score 4   Kitchen Mobility   Kitchen-Mobility Level Walker   Kitchen Activity Retrieve items;Transport items   Kitchen Mobility Comments Zina req for balance and cuing to keep RW close to self as she attempts to step around it or only places one hand on it when walking  Health Management   Health Management MEDICATION MANAGEMENT: Engaged in simulated med management with one row pill organizer  Pt unable to verbalize names of any of medications taken PTA  With cuing to maintain focus on task, pt able to organize 7/7 medications include PRN med  Pt does have S from son/daughter in law at home and will cont to recommend S during pill organizer at HI  f   Cognition   Overall Cognitive Status Impaired   Arousal/Participation Alert; Cooperative   Attention Difficulty attending to directions   Orientation Level Oriented X4   Memory Decreased short term memory Following Commands Follows multistep commands inconsistently   Comments RODNEY COGNITIVE ASSESSMENT: Completed standardized assessment of cognition with version 8 1 MOCA this session  Pt scored 14/30 indicative of moderate neurocognitive impairment  Completed in room with door shut, television and phone turned off to limit distraction  Pt verbalizing minimal awareness of current noticed functional deficits  Pt only completed schooling until 8th grade therefore provided with +1  Pt required repetition of all directions as pt attempting to answer questions or begin task prior to listening to full statement  Scoring on assessment is as followed: visuospatial/executive 1/5, naming 2/3, attention, 3/6, language 0/2, abstraction 0/2, delayed 1/5, orientation 6/6  During visuospatial task, pt verbalizing that she performed this section successful even though she was only able to accurately draw the contour of the clock  Pt cont to be significantly limited by attention during assessment and functional task completion  Pt cont to verbalize "I remember everything" but has impairments in immediate and delayed recall  Notified OTR of results of assessment and will determine rehab implications  Cont to assess functional cognition with functional task completion  Activity Tolerance   Activity Tolerance Patient tolerated treatment well   Assessment   Treatment Assessment Pt participated in skilled OT session with focus on functional mobility, functional cognition and standardized cognitive assessment, kitchen mobility  Refer above for details regarding medication management and [facility] completion  Pt also engaged in situational medical safety task  Pt able to answer 10/10 questions with thorough accurate responses  Of note, discussed with pt that she is not to drive upon dc as pt cont to verbalize that she feels confident in driving a car right now   Pt cont to have deficits in attention, memory, safety awareness, problem solving, and overall insight into cont functional and cognitive deficits  Pt would benefit from cont therapy with focus on RW safety, simple meal prep and kitchen mobility, functional cognition to promote indep upon dc  At this time, will recommend 24hr S provided by family at dc  Prognosis Fair   Problem List Decreased strength;Decreased endurance; Impaired balance;Decreased mobility;Pain   Barriers to Discharge Inaccessible home environment   Plan   Treatment/Interventions ADL retraining;Functional transfer training; Therapeutic exercise; Endurance training;Patient/family training;Equipment eval/education;Cognitive reorientation; Bed mobility   Progress Progressing toward goals   Recommendation   OT Discharge Recommendation 24 hour supervision/assist  (family pending pt progress)   OT Therapy Minutes   OT Time In 1000   OT Time Out 1130   OT Total Time (minutes) 90   OT Mode of treatment - Individual (minutes) 90   OT Mode of treatment - Concurrent (minutes) 0   OT Mode of treatment - Group (minutes) 0   OT Mode of treatment - Co-treat (minutes) 0   OT Mode of Treatment - Total time(minutes) 90 minutes   OT Cumulative Minutes 180   Therapy Time missed   Time missed?  No

## 2020-02-20 NOTE — TEAM CONFERENCE
Acute RehabilitationTeam Conference Note  Date: 2/20/2020   Time: 11:03 AM       Patient Name:  Sebastian Schwarz       Medical Record Number: [id number]   YOB: 1940  Sex: Female          Room/Bed:  [facility]01  Payor Info:  Payor: Juarez Sahu / Plan: MEDICARE A AND B / Product Type: Medicare A & B Fee for Service /      Admitting Diagnosis: CVA (cerebral vascular accident) ([facility] ) [I63 9]   Admit Date/Time:  2/18/2020  2:07 PM  Admission Comments: No comment available     Primary Diagnosis:  Intraparenchymal hemorrhage of brain ([facility] )  Principal Problem: Intraparenchymal hemorrhage of brain [facility])    Patient Active Problem List    Diagnosis Date Noted    Intraparenchymal hemorrhage of brain ([facility] ) 02/10/2020    Peripheral arteriosclerosis ([facility] ) 06/21/2019    Osteoporosis screening 03/08/2019    Eczema 12/03/2018    SOB (shortness of breath) 10/26/2018    Radiculopathy of lumbar region 10/02/2018    Corns 10/02/2018    Gastroesophageal reflux disease without esophagitis 06/04/2018    Iron deficiency anemia secondary to inadequate dietary iron intake 06/04/2018    Pain in both feet 03/15/2018    Alkaline phosphatase elevation 03/12/2018    Lumbar disc herniation with radiculopathy 12/08/2017    Hyperlipemia 11/15/2017    Chronic low back pain 10/24/2017    Hyperuricemia 06/23/2017    Pseudogout of left wrist 06/23/2017    Nephrolithiasis 06/21/2017    Acute on chronic diastolic congestive heart failure ([facility] ) 06/02/2017    Chronic venous insufficiency 06/02/2017    Elevated triglycerides with high cholesterol 06/02/2017    CKD (chronic kidney disease), [facility] III [facility] ) 05/19/2017    Benign essential hypertension 08/22/2016    Chronic diastolic (congestive) heart failure ([facility] ) 08/22/2016    Hypothyroidism 08/22/2016    Morbid obesity due to excess calories ([facility] ) 08/20/2016    Diverticulitis of colon 08/18/2016    WENDI (obstructive sleep apnea) 05/20/2016    Onychomycosis 04/29/2016    Morbid obesity with body mass index of 40 0-44 9 in adult [facility]) 03/10/2016    Knee pain 05/22/2015    Tarsal tunnel syndrome 06/24/2014    Difficulty in walking 03/24/2014    Plantar fascial fibromatosis 47/02/3920    Umbilical hernia 40/24/5646    Atherosclerosis of arteries of extremities (HCC) 08/28/2013    Hallux abductovalgus with bunions, unspecified laterality 07/26/2013    Chronic gout without tophus 07/02/2013    Arthropathy of knee 09/04/2012    Rupture of popliteal cyst 09/04/2012    Anxiety 09/04/2012    Hiatal hernia 08/27/2012       Physical Therapy:    Weight Bearing Status: Full Weight Bearing  Transfers: Minimal Assistance  Bed Mobility: Minimal Assistance  Amulation Distance (ft): 120 feet  Ambulation: Minimal Assistance  Assistive Device for Ambulation: Roller Walker  Number of Stairs: 2  Assistive Device for Stairs: Bilateral Hand Rails  Stair Assistance: Moderate Assistance  Ramp: Moderate Assistance  Assistive Device for Ramp: Roller Walker    Patient is an [de-identified]year old female presenting initially with altered mental status, vomiting and HA> She was found to have  a 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 and underwent craniectomy and evacuation  2/11/20  She was recommended rehab to maximize function and safety  Patient presents to rehab with seizure and craniectomy precautions with helmet  She presents with an additional problem list which includes HTN, CHF, 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, CKD, WENDI, anxiety, and chronic venous insufficiency  PTA, patient living at home with her son, DIL and grandson  She was fully (I), driving and active within her home  Patient with 1 KUMAR and a first floor set up  Grandon able to assist patient with S during the day while patient's son and DIL work  Patient uses a RW vs Rolator at all times and admits to 1 fall back in July   On evaluation, patient presents with impaired balance and righting reactions, decreased overall strength, impaired activity tolerance and decreased overall functional mobility  She is tangential however able to be redirected  Patient with impaired safety awareness however responded well during session to repeated instruction on proper hand placement and use of RW  She presents as a good rehab candidate and will require skilled PT intervention to progress functional mobility (I) and safety  Anticipate need for 7-10 days to achieve overall mod(I) household and S community distance mobilization  Occupational Therapy:  Eating: Independent  Grooming: Incidental Touching  Bathing: Minimal Assistance  Bathing: Minimal Assistance  Upper Body Dressing: Minimal Assistance  Lower Body Dressing: Minimal Assistance  Toileting: Minimal Assistance  Toilet Transfer: Minimal Assistance  Cognition: Exceptions to WNL  Cognition: Decreased Attention, Decreased Safety, Decreased Executive Functions  Orientation: Person, Place, Time, Situation  Discharge Recommendations: Home with:  [address] with[de-identified] 24 Hour Supervision, First Floor Setup, Family Support       Pt is an [de-identified] y/o female presenting to [facility] on 2/10/20 w/ AMS, vomiting, and headache  Pt found to have large R parietal/temporal intraparenchymal hemorrhage w/ mass effect requiring craniectomy and evacuation performed on 2/11  Cranial helmet AAT when OOB  PMH includes obesity, lumbar disc herniation, CHF, Chronic LBP, Diverticulitis, WENDI, Eczema, anxiety, and CKD-3  Pt reports completing ADLs and fxnl mobility Independently w/ rollator, shares IADLs, (+) driving short distances  Pt is currently functioning at overall Min A for ADLs and  for fxnl mobility w/ RW  Pt is limited by dec strength, dec endurance, impaired balance, impaired insight, poor attention to task, and ADL dysfunction   Pt will benefit from skilled OT services w/ focus on above barriers, assess need for DME, provide pt/family edu, and maximize fxnl indep to return to PLOF in 7-10 day ELOS to meet overall S goals  Speech Therapy:  Mode of Communication: Verbal  Cognition: Exceptions to WNL  Cognition: Decreased Memory, Decreased Executive Functions, Decreased Attention, Decreased Safety  Orientation: Person, Place, Time, Situation  Discharge Recommendations: Home with:  [address] with[de-identified] 24 Hour Supervision, Family Support(continued ST services pending pt progress)  Cognitive linguistic evaluation was initiated where pt demonstrating functional basic level comprehension and expression, however, pt most notably limited by deficits in attention and working memory where pt was significantly tangential and verbose  Pt also presenting w/ decreased insight into deficits and problem solving, impacting safety awareness  Pt may be recommended for 24 hour supervision at time of discharge  Due to current cognitive linguistic deficits, recommending skilled ST services at this time to maximize functional cognitive linguistic skills in order to improve level of independence and safety  Plan to complete remainder of CLQT+ in follow up session to gain additional insight into pt's current deficits and to further assist in guiding treatment plan  Nursing Notes:  Appetite: Good  Diet Type: 2 gram sodium diet                      Diet Patient/Family Education Complete: Yes    Type of Wound (LDA): Wound                    Type of Wound Patient/Family Education: Yes  Bladder: 7 - Complete Hooker     Bladder Patient/Family Education: Yes  Bowel: 6 - Modified Hooker     Bowel Patient/Family Education: Yes  Pain Location: Head     Pain Score: 0                          Pain Patient/Family Education: Yes  Medication Management/Safety  Injectable: (heparin)  Safe Administration: Yes  Medication Patient/Family Education Complete: Yes    Pt admitted S/P R ICH requiring R craniectomy on 2/11  Staples intact  PMH: HTN, CHF, hypothyroidism, CKD 3, HLD   Post-operatively patient completed 7 days of Keppra for seizure ppx  Seizure precautions  Maintained in a cranial helmet while OOB  Cleared for SQ Heparin for DVT ppx  Repeat CTH after 1 month  For HTN- SBP goal < 160  Continue amlodipine and losartan  Was on a higher dose of losartan as an outpt (25mg 2x daily)  BP has been at goal  Continue to hold outpt propranolol 40mg every 12 hour  CKD3- Baseline 1 1 - 1 3  Chronic combined systolic and diastolic heart failure- Continue Bumex 2mg daily  Was on a higher dose as an outpt (2mg daily except M/W/F take 2mg 2x daily)  Monitor volume status  Leukocytosis- Likely reactive  Pt is continent of bowel and bladder  Pt is impulsive and requires alarms for safety  This week we will encourage independence with ADLs  We will monitor lab results and vital signs  We will monitor wound for healing and s/s of infection  We will educate pt about craniectomy precautions and helmet  We will educate patient about repositioning to prevent skin breakdown and perform routine skin checks  We will monitor for adequate pain control  We will monitor for constipation and medicate as per orders  We will increase safety awareness with transfers and keep pt free from falls          Case Management:     Discharge Planning  Living Arrangements: Children  Support Systems: Family members, Children  Assistance Needed: yes  Type of Current Residence: Private residence  [address]: No  Pt is motivated and participating well with therapy  Pt expects to return home with her family and has been educated on potential recommendations for contd outpt therapy services  Following to assist w/dc planning needs  Is the patient actively participating in therapies?  yes  List any modifications to the treatment plan:     Barriers Interventions   Attention to task Cueing to attend task for safe completion   Safety, insight Safety education techniques   Balance, strength, edurance Therapy exercises, energy conservation Is the patient making expected progress toward goals? yes  List any update or changes to goals:     Medical Goals: Patient will be medically stable for discharge to [facility] [place] upon completion of rehab program and Patient will be able to manage medical conditions and comorbid conditions with medications and follow up upon completion of rehab program    Weekly Team Goals:   Rehab Team Goals  ADL Team Goal: Patient will require supervision with ADLs with least restrictive device upon completion of rehab program  Transfer Team Goal: Patient will be independent with transfers with least restrictive device upon completion of rehab program  Locomotion Team Goal: Patient will be independent with locomotion with least restrictive device upon completion of rehab program  Cognitive Team Goal: Patient will require supervision for basic and complex tasks upon completion of rehab program    Discussion: pt is presenting with the above barriers and is min a with adls, and ambulation, mod a on stairs  7-10 day goals with potential return home the end of next week/weekend  Pt is superivision for comprehension, mod a with  Memory and problem solving  Goals are for supervision with potential recommendations for outpt therapy    Anticipated Discharge Date:  reteam [facility] Team Members Present: The following team members are supervising care for this patient and were present during this Weekly Team Conference      Physician: Dr Elisabeth Mendoza MD  : Zeynep Villatoro MSW  Registered Nurse: Evangelina Herbert RN  Physical Therapist: Liss Jiménez DPT  Occupational Therapist: Fer Mcdowell MS, OTR/L  Speech Therapist: Cristina Garrett MA, CCC-SLP  Other: Marilee Caro, RN, BSN  [address] [place] [address] and Hospice

## 2020-02-20 NOTE — PROGRESS NOTES
02/20/20 1330   Pain Assessment   Pain Assessment No/denies pain   Pain Score No Pain   Restrictions/Precautions   Precautions Bed/chair alarms;Cognitive; Fall Risk;Impulsive;Seizure;Supervision on toilet/commode  (CRANI PREC; HELMET; [address] >30 AT ALL TIMES)   Weight Bearing Restrictions No   ROM Restrictions No   Braces or Orthoses Craniectomy Helmet   Cognition   Overall Cognitive Status Impaired   Arousal/Participation Alert; Cooperative   Attention Attends with cues to redirect   Orientation Level Oriented X4   Memory Decreased short term memory;Decreased recall of recent events   Following Commands Follows multistep commands with increased time or repetition   Subjective   Subjective Fatigue reported this session  Would not get OOB at the initial start of therapy at 13:00; agreeable after rest when therapist returned at 13:30   Roll Left and Right   Type of Assistance Needed Supervision   Amount of Physical Assistance Provided No physical assistance   Roll Left and Right CARE Score 4   Sit to Lying   Type of Assistance Needed Supervision   Amount of Physical Assistance Provided No physical assistance   Sit to Lying CARE Score 4   Lying to Sitting on Side of Bed   Type of Assistance Needed Supervision   Amount of Physical Assistance Provided No physical assistance   Lying to Sitting on Side of Bed CARE Score 4   Sit to Stand   Type of Assistance Needed Incidental touching   Amount of Physical Assistance Provided No physical assistance   Sit to Stand CARE Score 4   Bed-Chair Transfer   Type of Assistance Needed Incidental touching   Amount of Physical Assistance Provided No physical assistance   Chair/Bed-to-Chair Transfer CARE Score 4   Transfer Bed/Chair/Wheelchair   Limitations Noted In Balance; Sequencing  (safety)   Adaptive Equipment Roller Walker   Walk 10 Feet   Type of Assistance Needed Incidental touching   Amount of Physical Assistance Provided No physical assistance   Walk 10 Feet CARE Score 4   Walk 50 Feet with Two Turns   Type of Assistance Needed Incidental touching   Amount of Physical Assistance Provided No physical assistance   Walk 50 Feet with Two Turns CARE Score 4   Walk 150 Feet   Type of Assistance Needed Incidental touching   Amount of Physical Assistance Provided No physical assistance   Walk 150 Feet CARE Score 4   Ambulation   Does the patient walk? 2  Yes   Primary Mode of Locomotion Prior to Admission Walk   Distance Walked (feet) 100 ft  (+ 150' with RW )   Assist Device Roller Walker   Gait Pattern Improper weight shift; Forward Flexion   Limitations Noted In Endurance; Safety   Provided Assistance with: Balance;Direction   Wheel 50 Feet with Two Turns   Reason if not Attempted Activity not applicable   Wheel 50 Feet with Two Turns CARE Score 9   Wheel 150 Feet   Reason if not Attempted Activity not applicable   Wheel 178 Feet CARE Score 9   Wheelchair mobility   Does the patient use a wheelchair? 0  No   4 Steps   Reason if not Attempted Activity not applicable   4 Steps CARE Score 9   12 Steps   Reason if not Attempted Activity not applicable   12 Steps CARE Score 9   Toilet Transfer   Type of Assistance Needed Incidental touching   Amount of Physical Assistance Provided No physical assistance   Toilet Transfer CARE Score 4   Therapeutic Interventions   Balance 60'x2 with Rollator, 30'x2 with unilateral support, 30'x2 lateral ambulation along rail with UE assist vs hover assist; 10' backwards ambulation; 4 minutes standing ball toss with no UE support   Equipment Use   NuStep BLE/UE, level 1, 10 minutes    Assessment   Treatment Assessment Patient highly fatigued this PM and required max A to engage and participate  Therapist required to come back after allowing patient rest 30 minutes  Patient and RN stating she did not sleep well over night  Patient also requires frequent rest breaks during session 2* LBP from chronic stenosis   She mobilizes at an overall CGA however requires instruction for safety awareness  Patient uses a Rolator at home, given to her from a friend, however she states it does not have brakes  She was able to demonstrate good control using Rolator from [facility] with good control noted  Continued to utilize and reinforce safety with parking against support to sit, locking brakes and carrying items safely  Trialed unilateral and no UE support ambulation in multi directions; patient unstable and is observed reaching for support  She states, "I never walk without my walker" however in conversation later stated, "sometimes at night I don't bring my walker to the bathroom" and " sometimes I go to the store, I don't bring the walker"  Later found during discussion that patient utilizes furniture and external support to negotiate when no walker present  Continue to repeatively train patient for appropriate techniques and safety awareness  Patient making progress towards goals and will have 24 hour S at home  She will benefit form continued skilled PT to progress (I) and safety/     Problem List Decreased strength;Decreased range of motion;Decreased endurance; Impaired balance;Decreased mobility; Decreased coordination;Decreased cognition;Decreased safety awareness; Obesity;Orthopedic restrictions;Pain   Barriers to Discharge Inaccessible home environment;Decreased caregiver support   PT Barriers   Functional Limitation Car transfers; Ramp negotiation;Standing;Stair negotiation;Transfers; Walking   Plan   Treatment/Interventions Functional transfer training;LE strengthening/ROM; Therapeutic exercise;Elevations; Endurance training;Cognitive reorientation;Patient/family training;Equipment eval/education; Bed mobility;Gait training; Compensatory technique education   Progress Progressing toward goals   Recommendation   Recommendation 24 hour supervision/assist   Equipment Recommended Walker   PT Therapy Minutes   PT Time In 1330   PT Time Out 1430   PT Total Time (minutes) 60   PT Mode of treatment - Individual (minutes) 60   PT Mode of treatment - Concurrent (minutes) 0   PT Mode of treatment - Group (minutes) 0   PT Mode of treatment - Co-treat (minutes) 0   PT Mode of Treatment - Total time(minutes) 60 minutes   PT Cumulative Minutes 120   Therapy Time missed   Time missed?  No

## 2020-02-20 NOTE — PROGRESS NOTES
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  · Keppra completed  · Helmet when out of bed  · SBP goal < 160      HTN  · on Amlodipine/Losartan  Was on a higher dose of losartan as an outpt (25mg 2x daily)  · Continue to hold outpt propranolol 40mg every 12 hours  · No changes today/at goal of <616 systolic     CKD stage III; baseline 1 1-1 3  · stable  · Follows with Dr Lieutenant Smith as an outpt      Chronic combined systolic and diastolic heart failure  · Continue Bumex 2mg daily  Was on a higher dose as an outpt (2mg daily except M/W/F take 2mg 2x daily)  · stable     Leukocytosis  · Likely reactive/improving  · Trending down  · No signs of infx      Hypokalemia  · K 3 4 and replaced 2/18/20  · Stable today    The above assessment and plan was reviewed and updated as determined by my evaluation of the patient on 2/20/2020      Labs:   Results from last 7 days   Lab Units 02/20/20  0523 02/18/20  0449   WBC Thousand/uL 10 28* 10 98*   HEMOGLOBIN g/dL 11 3* 10 9*   HEMATOCRIT % 36 6 34 3*   PLATELETS Thousands/uL 195 174     Results from last 7 days   Lab Units 02/20/20  0524 02/18/20  0449   SODIUM mmol/L 140 139   POTASSIUM mmol/L 3 7 3 4*   CHLORIDE mmol/L 105 105   CO2 mmol/L 28 28   BUN mg/dL 15 17   CREATININE mg/dL 1 03 1 00   CALCIUM mg/dL 9 2 8 5                   Review of Scheduled Meds:    Current Facility-Administered Medications:  acetaminophen 650 mg Oral Q6H PRN Yaneli Costa MD   amLODIPine 10 mg Oral Daily Yaneli Costa MD   atorvastatin 40 mg Oral QPM Yaneli Costa MD   bisacodyl 10 mg Rectal Daily PRN Yaneli Costa MD   bumetanide 2 mg Oral Daily Yaneli Costa MD   heparin (porcine) 5,000 Units Subcutaneous [facility] Yaneli Costa MD levothyroxine 112 mcg Oral Early Morning Jenna Whaley MD   losartan 25 mg Oral Daily Jenna Whaley MD   melatonin 3 mg Oral HS Jenna Whaley MD   ondansetron 4 mg Oral Q6H PRN Jenna Whaley MD   oxyCODONE 2 5 mg Oral Q6H PRN Jenna Whaley MD   pantoprazole 40 mg Oral Early Morning Jenna Whaley MD   polyethylene glycol 17 g Oral Daily PRN Jenna Whaley MD   senna-docusate sodium 1 tablet Oral BID Jenna Whaley MD       Subjective/ HPI: Patients overnight issues or events were reviewed with nursing or staff during rounds or morning huddle session  No new or overnight issues  Offers no complaints  ROS:   A 10 point ROS was performed; negative except as noted above         Imaging:     No orders to display       *Labs /Radiology studiesReviewed  *Medications Reviewed and reconciled as needed  *Please refer to order section for additional ordered labs studies  *Case discussed with primary attending during morning huddle case rounds    Physical Examination:  Vitals:   Vitals:    02/19/20 2055 02/20/20 0541 02/20/20 0803 02/20/20 1525   BP: 149/65 142/68 143/63 140/67   BP Location: Right arm Right arm  Right arm   Pulse: 73 70 77 78   Resp: 20 18  18   Temp: 98 1 °F (36 7 °C) 98 °F (36 7 °C)  97 9 °F (36 6 °C)   TempSrc: Oral Oral  Oral   SpO2: 98% 97%  97%   Weight:       Height:           General Appearance: no distress, conversive  HEENT: PERRLA, conjuctiva normal; oropharynx clear; mucous membranes moist; hemicraniectomy site is soft and incision is w/o erythema/drainage and staples are intact   Neck:  Supple, no JVD; no pain with movement  Lungs: CTA, normal respiratory effort, no retractions, expiratory effort normal  CV: regular rate and rhythm; no rubs/murmurs/gallops  ABD: soft; ND/NT; +BS  EXT: no edema  Skin: normal turgor, normal texture, no rashes  Psych: affect normal, mood normal  Neuro: AAO; RASCON 5/5      The above physical exam was reviewed and updated as determined by my evaluation of the patient on 2/20/2020  Invasive Devices     None                    VTE Pharmacologic Prophylaxis: Heparin  Code Status: Level 3 - DNAR and DNI  Current Length of Stay: 2 day(s)      Total time spent:  30 minutes with more than 50% spent counseling/coordinating care  Counseling includes discussion with patient re: progress  and discussion with patient of his/her current medical state/information  Coordination of patient's care was performed in conjunction with primary service  Time invested included review of patient's labs, vitals, and management of their comorbidities with continued monitoring  In addition, this patient was discussed with medical team including physician and advanced extenders  The care of the patient was extensively discussed and appropriate treatment plan was formulated unique for this patient  ** Please Note:  voice to text software may have been used in the creation of this document   Although proof errors in transcription or interpretation are a potential of such software**

## 2020-02-20 NOTE — PLAN OF CARE
Problem: Potential for Falls  Goal: Patient will remain free of falls  Description  INTERVENTIONS:  - Assess patient frequently for physical needs  -  Identify cognitive and physical deficits and behaviors that affect risk of falls    -  Cobb fall precautions as indicated by assessment   - Educate patient/family on patient safety including physical limitations  - Instruct patient to call for assistance with activity based on assessment  - Modify environment to reduce risk of injury  - Consider OT/PT consult to assist with strengthening/mobility  Outcome: Progressing

## 2020-02-20 NOTE — PROGRESS NOTES
02/20/20 0830   Pain Assessment   Pain Assessment No/denies pain   Pain Score No Pain   Restrictions/Precautions   Precautions Bed/chair alarms;Cognitive; Fall Risk;Impulsive;Seizure;Supervision on toilet/commode  (CRANI PREC; HELMET; KEEP HOB ELEVATED >30 AT ALL TIMES)   Braces or Orthoses Craniectomy Helmet   Cognitive Linguisitic Assessments   Cognitive Linquistic Quick Test (CLQT) Pt completed remainder of CLQT+ assessment w/ a Composite Severity Rating Score of 1 8 out of 4 0, correlating to overall MODERATE cognitive linguistic deficits at time of assessment and in comparison to age matched peers ranging from 65-81 y/o  Pt's Cognitive Domain scores are as follows: Attention-score of 26, correlating to SEVERE deficits; Memory-score of 130, correlating to MILD deficits; Executive Functions-score of 6, correlating to SEVERE deficits; Language-score of 27, correlating to MILD deficits; Visuospatial Skills-score of 17, correlating to SEVERE deficits and Clock Drawing Screen-score of 4, correlating to SEVERE deficits  Pt scored at or above criterion cut score for 3 out of 10 tasks completed  Pt educated on results of assessment  Comprehension   Comprehension (FIM) 4 - Understands basic info/conversation 75-90% of time   Expression   Expression (FIM) 5 - Needs help/cues only RARELY (< 10% of the time)   Social Interaction   Social Interaction (FIM) 5 - Interacts appropriately with others 90% of time   Problem Solving   Problem solving (FIM) 3 - Solves basic problmes 50-74% of time   Memory   Memory (FIM) 3 - Recognizes, recalls/performs 50-74%   Speech/Language/Cognition Assessmetn   Treatment Assessment Pt completed remaining portions of the CLQT+ assessment w/ scores correlating to overall MODERATE cognitive linguistic deficits, characterized by deficits in attention, memory, executive functions, language and visuospatial skills   Pt continued to also present w/ tangential and verbose speech but was able to be redirected, which was consistently required  Discussed current deficits, as well as recommendations for skilled ST services at this time to which pt was in agreement but remains w/ decreased insight into overall medical situation, safety awareness and cognitive linguistic deficits  Pt will benefit from further skilled ST services at this time to maximize functional cognitive linguistic skills in order to improve overall safety and level of independence  SLP Therapy Minutes   SLP Time In 0830   SLP Time Out 0900   SLP Total Time (minutes) 30   SLP Mode of treatment - Individual (minutes) 30   SLP Mode of treatment - Concurrent (minutes) 0   SLP Mode of treatment - Group (minutes) 0   SLP Mode of treatment - Co-treat (minutes) 0   SLP Mode of Treatment - Total time(minutes) 30 minutes   SLP Cumulative Minutes 60   Therapy Time missed   Time missed?  No

## 2020-02-20 NOTE — PCC CARE MANAGEMENT
Pt is participating well with therapy and is scheduled to dc home later this week  Family training is scheduled for Friday and outpt ot and slp services are scheduled at [facility] for additional dc needs

## 2020-02-21 PROCEDURE — 97129 THER IVNTJ 1ST 15 MIN: CPT

## 2020-02-21 PROCEDURE — 97130 THER IVNTJ EA ADDL 15 MIN: CPT

## 2020-02-21 PROCEDURE — 97535 SELF CARE MNGMENT TRAINING: CPT

## 2020-02-21 PROCEDURE — 97112 NEUROMUSCULAR REEDUCATION: CPT

## 2020-02-21 PROCEDURE — 99232 SBSQ HOSP IP/OBS MODERATE 35: CPT | Performed by: PHYSICAL MEDICINE & REHABILITATION

## 2020-02-21 PROCEDURE — 97110 THERAPEUTIC EXERCISES: CPT

## 2020-02-21 PROCEDURE — 97530 THERAPEUTIC ACTIVITIES: CPT

## 2020-02-21 RX ADMIN — ATORVASTATIN CALCIUM 40 MG: 40 TABLET, FILM COATED ORAL at 17:31

## 2020-02-21 RX ADMIN — HEPARIN SODIUM 5000 UNITS: 5000 INJECTION INTRAVENOUS; SUBCUTANEOUS at 05:18

## 2020-02-21 RX ADMIN — HEPARIN SODIUM 5000 UNITS: 5000 INJECTION INTRAVENOUS; SUBCUTANEOUS at 21:02

## 2020-02-21 RX ADMIN — HEPARIN SODIUM 5000 UNITS: 5000 INJECTION INTRAVENOUS; SUBCUTANEOUS at 15:13

## 2020-02-21 RX ADMIN — SENNOSIDES AND DOCUSATE SODIUM 1 TABLET: 8.6; 5 TABLET ORAL at 17:31

## 2020-02-21 RX ADMIN — MELATONIN 3 MG: at 21:02

## 2020-02-21 RX ADMIN — SENNOSIDES AND DOCUSATE SODIUM 1 TABLET: 8.6; 5 TABLET ORAL at 08:07

## 2020-02-21 RX ADMIN — BUMETANIDE 2 MG: 2 TABLET ORAL at 08:07

## 2020-02-21 RX ADMIN — LEVOTHYROXINE SODIUM 112 MCG: 112 TABLET ORAL at 05:18

## 2020-02-21 RX ADMIN — LOSARTAN POTASSIUM 25 MG: 25 TABLET, FILM COATED ORAL at 08:06

## 2020-02-21 RX ADMIN — PANTOPRAZOLE SODIUM 40 MG: 40 TABLET, DELAYED RELEASE ORAL at 05:18

## 2020-02-21 RX ADMIN — AMLODIPINE BESYLATE 10 MG: 10 TABLET ORAL at 08:06

## 2020-02-21 NOTE — PROGRESS NOTES
02/21/20 0700   Pain Assessment   Pain Assessment No/denies pain   Pain Score No Pain   Restrictions/Precautions   Precautions Bed/chair alarms;Cognitive; Fall Risk;Impulsive;Seizure;Supervision on toilet/commode  (crani prec, Helmet OOB, HOB elevated >30 all times)   Braces or Orthoses Craniectomy Helmet   Lifestyle   Autonomy "I need to get home to my family so they can have cooked meals"   Oral Hygiene   Type of Assistance Needed Supervision   Amount of Physical Assistance Provided No physical assistance   Comment Pt CS in stance w/ RW at sink to complete oral hygiene, no LOB noted   Oral Hygiene CARE Score 4   Grooming   Able To Initiate Tasks; Wash/Dry Face;Brush/Clean Teeth;Wash/Dry Hands   Limitation Noted In Strength;Timeliness; Safety   Findings Pt impulsive to attempt to stand without w/c brakes locked  Shower/Bathe Self   Type of Assistance Needed Physical assistance   Amount of Physical Assistance Provided 25%-49%   Comment Pt requried VC to utilize soap for SB and VC for thoroughness  Pt noted to "pour" soap without opening and required VC to problem solve  Pt able to bathe UB seated  Pt CS in stance to bathe buttocks/jimmie w/ VC for thorougness, and A to bathe b/l LE  Shower/Bathe Self CARE Score 3   Bathing   Assessed Bath Style Sponge Bath   Anticipated D/C Bath Style   (pending, pt still has orders per MD to NOT shower)   Able to Gather/Transport Yes   Able to Adjust Water Temperature Yes   Able to Wash/Rinse/Dry (body part) Left Arm;Right Arm;L Upper Leg;R Upper Leg;Chest;Abdomen;Perineal Area; Buttocks   Positioning Seated;Standing   Tub/Shower Transfer   Reason Not Assessed Sponge Bath;Medical   Upper Body Dressing   Type of Assistance Needed Physical assistance   Amount of Physical Assistance Provided Less than 25%   Comment Pt able to don/doff shirt seated and bra by bringing down over buttocks CS in stance, pt reports putting bra on this way PTA   Pt required A to thread sweater behind trunk and over RUE  Upper Body Dressing CARE Score 3   Lower Body Dressing   Type of Assistance Needed Incidental touching   Amount of Physical Assistance Provided No physical assistance   Comment Pt able to thread undergarment/pants seated and CG w/ RW in stance for CM over hips   Lower Body Dressing CARE Score 4   Putting On/Taking Off Footwear   Type of Assistance Needed Physical assistance   Amount of Physical Assistance Provided 50%-74%   Comment Pt required A to don/doff socks however able to don slip on shoes   Putting On/Taking Off Footwear CARE Score 2   Dressing/Undressing Clothing   Able to  [address] Shirt;Bra   Don UB Clothes Pullover Shirt;Bra   Remove LB Clothes Pants; Undergarment;Socks   Don LB Clothes Pants; Undergarment;Socks; Shoes   Limitations Noted In Balance; Endurance;Problem Solving; Safety;Strength;ROM; Timeliness   Positioning Supported Sit;Standing   Findings Pt able to retrieve all appropriate clothing from drawers and completes seated in chair after ambulating over to chair w/ RW though pt demo dec safety and noted to leave RW to the side while attempting to sit in chair  OT provided edu to keep RW with until seated however agreeable that pt is safe to sit to retreive clothing from drawers which pt reports doing at home  Sit to Stand   Type of Assistance Needed Supervision   Amount of Physical Assistance Provided No physical assistance   Comment CS w/ RW, VC for safety   Sit to Stand CARE Score 4   Bed-Chair Transfer   Type of Assistance Needed Incidental touching   Amount of Physical Assistance Provided No physical assistance   Comment CG w/ RW, VC for safety   Chair/Bed-to-Chair Transfer CARE Score 4   Transfer Bed/Chair/Wheelchair   Positioning Concerns Cognition   Limitations Noted In Balance; Endurance;Problem Solving;UE Strength;LE Strength; Sequencing   Adaptive Equipment Roller Walker   Cognition   Overall Cognitive Status Impaired Arousal/Participation Alert; Cooperative   Attention Attends with cues to redirect   Orientation Level Oriented X4   Memory Decreased short term memory;Decreased recall of recent events   Following Commands Follows multistep commands with increased time or repetition   Comments Pt cont to present w/ dec insight into deficits and states "I can go home, I'm okay, I'm safe"  Pt cont to be impulsive at times  Activity Tolerance   Activity Tolerance Patient tolerated treatment well   Assessment   Treatment Assessment Per PCA Kurtis and CYNTHIA Soliz, pt was found on the floor this AM prior to OT session due to pt claim "my call bell was on the floor and I was trying to reach for it" while seated in the recliner  Per both CYNTHIA Soliz and TRACI Colon, pt with no injuries  TRACI Colon wheeled pt down to therapy gym and stated "the pt is ready for therapy"  OT initiated SB routine with pt when notified by Prince Gandara that per charge nurse pt is to be on hold for therapy until assessed by Dr Gin Gomez  OT assisted pt with finishing up routine and dressing  Pt returned to recliner w/ all necessary items in place, alarm on, and all items within reach and call bell clipped to pt  OT discussed at [facility] with pt that pt should not be reaching toward the floor due to fall risk and that if pt cannot reach call bell or call bell falls on the floor pt should yell loudly for help, pt receptive however cont to demo dec safety awarness and insight  Cont OT POC w/ focus on safety, stand tolerance, endurance, and fxnl cognition to maximize pt rehab potential     Prognosis Fair   Problem List Decreased strength;Decreased range of motion;Decreased endurance; Impaired balance;Decreased mobility; Decreased coordination;Decreased cognition; Impaired judgement;Decreased safety awareness;Orthopedic restrictions;Pain   Barriers to Discharge Inaccessible home environment;Decreased caregiver support   Plan   Treatment/Interventions ADL retraining;Functional transfer training; Endurance training;Cognitive reorientation;Patient/family training   Progress Progressing toward goals   Recommendation   OT Discharge Recommendation 24 hour supervision/assist   OT Therapy Minutes   OT Time In 0700   OT Time Out 0750   OT Total Time (minutes) 50   OT Mode of treatment - Individual (minutes) 50   OT Mode of treatment - Concurrent (minutes) 0   OT Mode of treatment - Group (minutes) 0   OT Mode of treatment - Co-treat (minutes) 0   OT Mode of Treatment - Total time(minutes) 50 minutes   OT Cumulative Minutes 230   Therapy Time missed   Time missed?  No

## 2020-02-21 NOTE — PROGRESS NOTES
02/21/20 1000   Pain Assessment   Pain Assessment No/denies pain   Pain Score No Pain   Restrictions/Precautions   Precautions Bed/chair alarms;Cognitive; Fall Risk;Impulsive;Seizure;Supervision on toilet/commode  (crani prec, Helmet OOB, HOB elevated >30 all times)   Weight Bearing Restrictions No   ROM Restrictions No   Braces or Orthoses Craniectomy Helmet   General   Change In Medical/Functional Status Patient slid out of chair this AM; no injury, cleared for PT treatment by MD    Cognition   Overall Cognitive Status Impaired   Arousal/Participation Alert; Cooperative   Attention Attends with cues to redirect   Orientation Level Oriented X4   Memory Decreased short term memory;Decreased recall of recent events;Decreased recall of precautions   Following Commands Follows one step commands with increased time or repetition   Comments tangential; verbose   Subjective   Subjective Agreeable to particiapte  Reports no injury from this AM    Sit to Stand   Type of Assistance Needed Supervision   Amount of Physical Assistance Provided No physical assistance   Sit to Stand CARE Score 4   Bed-Chair Transfer   Type of Assistance Needed Incidental touching   Amount of Physical Assistance Provided No physical assistance   Comment CG/CS   Chair/Bed-to-Chair Transfer CARE Score 4   Transfer Bed/Chair/Wheelchair   Limitations Noted In Balance; Sequencing;LE Strength  (safety)   Adaptive Equipment Roller Walker   Walk 10 Feet   Type of Assistance Needed Incidental touching   Amount of Physical Assistance Provided No physical assistance   Comment CG/CS   Walk 10 Feet CARE Score 4   Walk 50 Feet with Two Turns   Type of Assistance Needed Incidental touching   Amount of Physical Assistance Provided No physical assistance   Comment CG/CS   Walk 50 Feet with Two Turns CARE Score 4   Walk 150 Feet   Type of Assistance Needed Incidental touching   Amount of Physical Assistance Provided No physical assistance   Comment CG/CS   Walk 150 Feet CARE Score 4   Ambulation   Does the patient walk? 2  Yes   Primary Mode of Locomotion Prior to Admission Walk   Distance Walked (feet) 100 ft  (with RW + 100'x2 with Rolator + 150' with Rolator )   Assist Device Roller Walker;Rollator   Gait Pattern Inconsistant Kathy; Forward Flexion   Limitations Noted In Endurance; Safety   Provided Assistance with: Balance;Direction   Findings requires verbal instruction for proper hand placement during STS transitions; requires instruction for safety when using Rolator   Wheel 50 Feet with Two Turns   Reason if not Attempted Activity not applicable   Wheel 50 Feet with Two Turns CARE Score 9   Wheel 150 Feet   Reason if not Attempted Activity not applicable   Wheel 277 Feet CARE Score 9   Wheelchair mobility   Does the patient use a wheelchair? 0  No   4 Steps   Reason if not Attempted Activity not applicable   4 Steps CARE Score 9   12 Steps   Reason if not Attempted Activity not applicable   12 Steps CARE Score 9   Therapeutic Interventions   Strengthening seated LAQ and HF during PT phone call with DIL, see below    Balance 60'x2 with unilateral support against railing; reaching for and carrying cones while using Rolator    Other Comments   Comments Call placed to DIL: refer to assessment   Assessment   Treatment Assessment Patient engaged in 60 minute PT treatment session with little complaints  Patient with fall, sliding out of chair this AM, with no reported injury and was cleared for treatment session by MD  Patient limited during session by conversation as she is tangential and has difficulty focusing on task  With lack of focus and concentration, safety with activity is compromising  For example, patient ambulating with a RW as her phone began to ring  Patient taking phone out of pocket in right hand and continued to walk, controlling her RW with only L hand  Able to be re-directed and verbalized understanding of safety precautions   Patient however will likely need 24 hour S at home related to safety  Call placed to DIL this session to schedule family training  Family coming in Sunday but does not want to participate in training as they are bringing in a friend for visitation  Family unable to come in for training until Friday, 2/28 at 11:00  Email, provided by daughter, given to Luzma Cintia from [facility] as family requesting [facility]HA to help with bathing, etc  Notified DIL that tentative plan for discharge will be Saturday, 2/29 after training  Bravo encouraged to attend as well as he will be primary caregiver  Patient will benefit from continued skilled PT intervention to progress functional mobility (I) and safety  Problem List Decreased endurance; Impaired balance;Decreased mobility; Decreased coordination;Decreased cognition;Decreased safety awareness;Orthopedic restrictions   Barriers to Discharge Inaccessible home environment;Decreased caregiver support   PT Barriers   Functional Limitation Car transfers; Ramp negotiation;Stair negotiation;Standing;Transfers; Walking   Plan   Treatment/Interventions Functional transfer training;LE strengthening/ROM; Elevations; Therapeutic exercise; Endurance training;Patient/family training;Equipment eval/education; Bed mobility;Gait training; Compensatory technique education   Progress Progressing toward goals   Recommendation   Recommendation 24 hour supervision/assist   Equipment Recommended Walker   PT Therapy Minutes   PT Time In 1000   PT Time Out 1100   PT Total Time (minutes) 60   PT Mode of treatment - Individual (minutes) 60   PT Mode of treatment - Concurrent (minutes) 0   PT Mode of treatment - Group (minutes) 0   PT Mode of treatment - Co-treat (minutes) 0   PT Mode of Treatment - Total time(minutes) 60 minutes   PT Cumulative Minutes 180   Therapy Time missed   Time missed?  No

## 2020-02-21 NOTE — PROGRESS NOTES
Internal Medicine Progress Note  Patient: Neida Hernandez  Age/sex: [de-identified] y o  female  Medical Record #: [id number]      ASSESSMENT/PLAN: (Interval History)  Neida Hernandez is seen and examined and management for following issues:    Rt parietal/temporal ICH; s/p Rt decompressive hemicraniectomy 2/11/20  · Keppra completed  · Helmet when out of bed  · SBP goal < 160      HTN  · on Amlodipine/Losartan  Was on a higher dose of losartan as an outpt (25mg 2x daily)  · Continue to hold outpt propranolol 40mg every 12 hours 2/2 bradycardia  · No changes today/at goal of <055 systolic     CKD stage III; baseline 1 1-1 3  · stable  · Follows with Dr Jaylene Palmer as an outpt      Chronic diastolic heart failure; LVEF 65%, mod-severe MR/mild-mod TR  · Continue Bumex 2mg daily  Was on a higher dose as an outpt (2mg daily except M/W/F take 2mg 2x daily)  · stable     Leukocytosis  · Likely reactive/improving  · Trending down  · No signs of infx        The above assessment and plan was reviewed and updated as determined by my evaluation of the patient on 2/21/2020      Labs:   Results from last 7 days   Lab Units 02/20/20  0523 02/18/20  0449   WBC Thousand/uL 10 28* 10 98*   HEMOGLOBIN g/dL 11 3* 10 9*   HEMATOCRIT % 36 6 34 3*   PLATELETS Thousands/uL 195 174     Results from last 7 days   Lab Units 02/20/20  0524 02/18/20  0449   SODIUM mmol/L 140 139   POTASSIUM mmol/L 3 7 3 4*   CHLORIDE mmol/L 105 105   CO2 mmol/L 28 28   BUN mg/dL 15 17   CREATININE mg/dL 1 03 1 00   CALCIUM mg/dL 9 2 8 5                   Review of Scheduled Meds:    Current Facility-Administered Medications:  acetaminophen 650 mg Oral Q6H PRN Myke Negro MD   amLODIPine 10 mg Oral Daily Myke Negro MD   atorvastatin 40 mg Oral QPM Myke Negro MD   bisacodyl 10 mg Rectal Daily PRN Myke Negro MD   bumetanide 2 mg Oral Daily Myke Negro MD   heparin (porcine) 5,000 Units Subcutaneous Q8H [facility] Myke Negro MD   levothyroxine 112 mcg Oral Early Morning Kelly Kay MD   losartan 25 mg Oral Daily Kelly Kay MD   melatonin 3 mg Oral HS Kelly Kay MD   ondansetron 4 mg Oral Q6H PRN Kelly Kay MD   oxyCODONE 2 5 mg Oral Q6H PRN Kelly Kay MD   pantoprazole 40 mg Oral Early Morning Kelyl Kay MD   polyethylene glycol 17 g Oral Daily PRN Kelly Kay MD   senna-docusate sodium 1 tablet Oral BID Kelly Kay MD       Subjective/ HPI: Patients overnight issues or events were reviewed with nursing or staff during rounds or morning huddle session  No new or overnight issues  Offers no complaints  ROS:   A 10 point ROS was performed; negative except as noted above         Imaging:     No orders to display       *Labs /Radiology studiesReviewed  *Medications Reviewed and reconciled as needed  *Please refer to order section for additional ordered labs studies  *Case discussed with primary attending during morning huddle case rounds    Physical Examination:  Vitals:   Vitals:    02/20/20 1525 02/20/20 2047 02/21/20 0536 02/21/20 0654   BP: 140/67 135/60 146/65 157/67   BP Location: Right arm Left arm Right arm Right arm   Pulse: 78 82 82 80   Resp: 18 18 16 18   Temp: 97 9 °F (36 6 °C) 97 9 °F (36 6 °C) 98 4 °F (36 9 °C) 97 8 °F (36 6 °C)   TempSrc: Oral Axillary Oral Oral   SpO2: 97% 98% 96% 96%   Weight:       Height:           General Appearance: no distress, conversive  HEENT: PERRLA, conjuctiva normal; oropharynx clear; mucous membranes moist; right hemicraniectomy site is w/o erythema/drainage and staples are intact   Neck:  Supple, no JVD; no pain with movement  Lungs: CTA, normal respiratory effort, no retractions, expiratory effort normal  CV: regular rate and rhythm; no rubs/murmurs/gallops, PMI normal   ABD: soft; ND/NT; +BS  EXT: no edema  Skin: normal turgor, normal texture, no rashes  Psych: affect normal, mood normal  Neuro: AAO; RACSON 5/5      The above physical exam was reviewed and updated as determined by my evaluation of the patient on 2/21/2020  Invasive Devices     None                    VTE Pharmacologic Prophylaxis: Heparin  Code Status: Level 3 - DNAR and DNI  Current Length of Stay: 3 day(s)      Total time spent:  30 minutes with more than 50% spent counseling/coordinating care  Counseling includes discussion with patient re: progress  and discussion with patient of his/her current medical state/information  Coordination of patient's care was performed in conjunction with primary service  Time invested included review of patient's labs, vitals, and management of their comorbidities with continued monitoring  In addition, this patient was discussed with medical team including physician and advanced extenders  The care of the patient was extensively discussed and appropriate treatment plan was formulated unique for this patient  ** Please Note:  voice to text software may have been used in the creation of this document   Although proof errors in transcription or interpretation are a potential of such software**

## 2020-02-21 NOTE — PROGRESS NOTES
02/21/20 1100   Pain Assessment   Pain Assessment No/denies pain   Pain Score No Pain   Restrictions/Precautions   Precautions Bed/chair alarms;Cognitive; Fall Risk;Supervision on toilet/commode  (HOB >30 degrees AAT)   Weight Bearing Restrictions No   ROM Restrictions No   Braces or Orthoses Craniectomy Helmet   Lifestyle   Autonomy "I think I will do fine at home, I can call my grandson in the basement in case I fall and would need help "   Sit to Stand   Type of Assistance Needed Supervision   Amount of Physical Assistance Provided No physical assistance   Sit to Stand CARE Score 4   Bed-Chair Transfer   Type of Assistance Needed Incidental touching   Amount of Physical Assistance Provided No physical assistance   Comment CGA w/ rollator, rollator in therapy only per PT Linda  Rollator to be kept in PT gym when not in use  RW to be utilized when staff assisting  Chair/Bed-to-Chair Transfer CARE Score 4   Cognition   Overall Cognitive Status Impaired   Arousal/Participation Alert; Cooperative   Attention Attends with cues to redirect   Orientation Level Oriented X4;Oriented to place;Oriented to person;Oriented to time;Oriented to situation   Memory Decreased short term memory;Decreased recall of recent events;Decreased recall of precautions   Following Commands Follows one step commands with increased time or repetition   Comments Pt stated, "I want to go home, I will be fine to do things " demo poor insight into deficits  Spoke w/ pt and wrote down memory, problem solving, safety, and attention/focus as main factors to improve for acute rehab prior to d/c home  Spoke w/ pt regarding changes in brain since sx including changes w/ sleep/wake cycle w/ pt staing, "I can sleep during the day, and wide awake at night   Don't you think I will be better in my own bed?" Add'lly, informed pt on plan to assess meal prep, med management and balance challenges to to inc safety at home and allow for team to make best recommendations to dec injury and readmission  Completed safety cards, pt able to correctly solve 2/12, required Mod prompts to answer remaining cards  For example: newspaper near heater  Pt able to ID heater however stated unsafe situation due to newspaper to be tripping hazard and unable to ID fire risk  Following safety cards, completed 5 cause/effect cards, able to solve 1/5 without cues, req cues/prompts for details on picture and pt required OTR to provide answers for 2/5 of cards  Assessment   Treatment Assessment Seen for skilled OT w/ focus on cognitive retraining to maximize insight into deficits and problem solving; see notation above for inc detail  Of note, pt w/ fall this AM reaching for call bell, no injury  Pt cont to demo poor safety awareness w/ rollator brake management, poor cognitive flexibility, and poor attention to task  Will benefit from repetitive rollator management training, meal prep management, med management, dynamic standing balance, and ACLS to asssit w/ safe d/c planning  Pt was left resting in recliner w/ all needs within reach and chair alarm activated  Pt's family coming in on Sunday, would benefit from scheduling formal family training  Prognosis Fair   Problem List Decreased endurance; Impaired balance;Decreased mobility; Decreased coordination;Decreased cognition;Decreased safety awareness;Orthopedic restrictions   Plan   Treatment/Interventions ADL retraining;Functional transfer training; Therapeutic exercise; Endurance training;Cognitive reorientation;Patient/family training;Equipment eval/education   Progress Progressing toward goals   Recommendation   OT Discharge Recommendation 24 hour supervision/assist   OT Therapy Minutes   OT Time In 1100   OT Time Out 1140   OT Total Time (minutes) 40   OT Mode of treatment - Individual (minutes) 40   OT Mode of treatment - Concurrent (minutes) 0   OT Mode of treatment - Group (minutes) 0   OT Mode of treatment - Co-treat (minutes) 0 OT Mode of Treatment - Total time(minutes) 40 minutes   OT Cumulative Minutes 270   Therapy Time missed   Time missed?  No No

## 2020-02-21 NOTE — PROGRESS NOTES
PM&R Progress Note:    Subjective: Patient seen face to face  Had a slide out of her recliner chair today while reaching for her call bell  No injury sustained  Doing well in therapies today seen ambulating in the hallway with rolling walker needing contact guard assist    Review of Systems:   Review of Systems   Constitutional: Negative  HENT: Negative  Eyes: Negative  Respiratory: Negative  Cardiovascular: Negative  Gastrointestinal: Negative  Endocrine: Negative  Genitourinary: Negative  Musculoskeletal: Negative  Skin: Negative  Allergic/Immunologic: Negative  Neurological: Negative  Hematological: Negative  Psychiatric/Behavioral: Negative  Objective:   /67 (BP Location: Right arm)   Pulse 80   Temp 97 8 °F (36 6 °C) (Oral)   Resp 18   Ht 5' 1" (1 549 m)   Wt 92 kg (202 lb 13 2 oz)   SpO2 96%   BMI 38 32 kg/m²     Physical Exam   Constitutional: She is oriented to person, place, and time  She appears well-developed and well-nourished  HENT:   Staples on scalp visualized, clean dry and intact   Eyes: Pupils are equal, round, and reactive to light  EOM are normal    Cardiovascular: Normal rate and regular rhythm  Pulmonary/Chest: Breath sounds normal  She has no wheezes  She has no rales  Abdominal: Soft  Bowel sounds are normal  She exhibits no distension  There is no tenderness  Musculoskeletal: Normal range of motion  Neurological: She is alert and oriented to person, place, and time  Decreased insight and poor carry-over   Skin: Skin is warm  Psychiatric: She has a normal mood and affect  Nursing note and vitals reviewed  Assessment/Plan: Elke Durham is a [de-identified] y o  female with HTN, CHF, hypothyroidism, CKD 3, HLD who presented to the [facility] on 2/10/20 with mental status changes, vomiting, and headache    She was found to have a 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  She required a craniectomy and evacuation performed by Dr Manav Finnegan on 2/11/20  Post-operatively patient completed 7 days of Keppra for seizure ppx  Maintained in a cranial helmet while OOB  Cleared for SQ Heparin for DVT ppx  Further work-up revealed no mass, ischemia or critical stenosis  It was recommended to repeat CTH in 1 month  Patient found to have functional deficits from her baseline  Ad[facility] on [facility]/18/20         Plan:   · Rehabilitation plan:  Functional deficits:  Cognitive deficits, generalized weakness  [address] [place] [address], Ne = 14/30  Patient will likely require 24/7 care at home upon discharge  Continue current PT/OT/SLP plan  · 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 by Dr Manav Finnegan on 2/11/20  Cranial precautions  +Helmet while OOB  F/U CTH in 1 month 3/11/20  · HTN: managed on Norvasc, Cozaar  · CHF: combined - managed on Bumex    ECHO showed an EF of 65%  · GERD: managed on Protonix (therapeutic sub for omeprazole)  · Hypothyroidism: managed on Synthroid  · HLD: managed on Lipitor  · CKD 3: baseline Cr = 1 1 - 1 3  · DVT ppx: managed on SQ Heparin 5000 units Q8h and SCDs  · Appreciate internal medicine consultants medical comanagement  · Disposition: Reteam - Likely for discharge next week       Lab Results   Component Value Date    WBC 10 28 (H) 02/20/2020    HGB 11 3 (L) 02/20/2020    HCT 36 6 02/20/2020    MCV 88 02/20/2020     02/20/2020     Lab Results   Component Value Date    SODIUM 140 02/20/2020    K 3 7 02/20/2020     02/20/2020    CO2 28 02/20/2020    BUN 15 02/20/2020    CREATININE 1 03 02/20/2020    GLUC 88 02/20/2020    CALCIUM 9 2 02/20/2020     Lab Results   Component Value Date    INR 1 13 02/10/2020    INR 1 01 03/09/2016    PROTIME 13 9 02/10/2020    PROTIME 10 7 03/09/2016           Current Facility-Administered Medications:     acetaminophen (TYLENOL) tablet 650 mg, 650 mg, Oral, Q6H PRN, Benjamin Cavazos MD    amLODIPine (NORVASC) tablet 10 mg, 10 mg, Oral, Daily, Alexander Marina MD, 10 mg at 02/21/20 0806    atorvastatin (LIPITOR) tablet 40 mg, 40 mg, Oral, QPM, Alexnader Marina MD, 40 mg at 02/20/20 1719    bisacodyl (DULCOLAX) rectal suppository 10 mg, 10 mg, Rectal, Daily PRN, Alexander Marina MD    bumetanide (BUMEX) tablet 2 mg, 2 mg, Oral, Daily, Alexander Marina MD, 2 mg at 02/21/20 0807    heparin (porcine) subcutaneous injection 5,000 Units, 5,000 Units, Subcutaneous, Q8H [facility], Alexander Marina MD, 5,000 Units at 02/21/20 0518    levothyroxine tablet 112 mcg, 112 mcg, Oral, Early Morning, Alexander Marina MD, 112 mcg at 02/21/20 0518    losartan (COZAAR) tablet 25 mg, 25 mg, Oral, Daily, Alexander Marina MD, 25 mg at 02/21/20 0806    melatonin tablet 3 mg, 3 mg, Oral, HS, Alexander Marina MD, 3 mg at 02/20/20 2135    ondansetron (ZOFRAN-ODT) dispersible tablet 4 mg, 4 mg, Oral, Q6H PRN, Alexander Marina MD    oxyCODONE (ROXICODONE) IR tablet 2 5 mg, 2 5 mg, Oral, Q6H PRN, Alexandre Marina MD    pantoprazole (PROTONIX) EC tablet 40 mg, 40 mg, Oral, Early Morning, Alexander Marina MD, 40 mg at 02/21/20 0518    polyethylene glycol (MIRALAX) packet 17 g, 17 g, Oral, Daily PRN, Alexander Marina MD    senna-docusate sodium (SENOKOT S) 8 6-50 mg per tablet 1 tablet, 1 tablet, Oral, BID, Alexander Marina MD, 1 tablet at 02/21/20 9894    Past Medical History:   Diagnosis Date    Anxiety     Arthritis     joints    Cataract     Disease of thyroid gland     Eczema     GERD (gastroesophageal reflux disease)     Gout     Heart failure ([address] )     Hepatitis     Hiatal hernia     Hypertension     Onychomycosis     last assessed: 4/29/16    Orthopnea     resolved: 1/8/16    Sleep apnea     wears CPAP       Patient Active Problem List    Diagnosis Date Noted    Intraparenchymal hemorrhage of brain ([facility] ) 02/10/2020    Peripheral arteriosclerosis ([place] [address] ) 06/21/2019    Osteoporosis screening 03/08/2019    Eczema 12/03/2018    SOB (shortness of breath) 10/26/2018    Radiculopathy of lumbar region 10/02/2018    Corns 10/02/2018    Gastroesophageal reflux disease without esophagitis 06/04/2018    Iron deficiency anemia secondary to inadequate dietary iron intake 06/04/2018    Pain in both feet 03/15/2018    Alkaline phosphatase elevation 03/12/2018    Lumbar disc herniation with radiculopathy 12/08/2017    Hyperlipemia 11/15/2017    Chronic low back pain 10/24/2017    Hyperuricemia 06/23/2017    Pseudogout of left wrist 06/23/2017    Nephrolithiasis 06/21/2017    Acute on chronic diastolic congestive heart failure ([facility] ) 06/02/2017    Chronic venous insufficiency 06/02/2017    Elevated triglycerides with high cholesterol 06/02/2017    CKD (chronic kidney disease), stage III ([facility]) 05/19/2017    Benign essential hypertension 08/22/2016    Chronic diastolic (congestive) heart failure ([facility] ) 08/22/2016    Hypothyroidism 08/22/2016    Morbid obesity due to excess calories ([facility] ) 08/20/2016    Diverticulitis of colon 08/18/2016    WENDI (obstructive sleep apnea) 05/20/2016    Onychomycosis 04/29/2016    Morbid obesity with body mass index of 40 0-44 9 in adult [facility]) 03/10/2016    Knee pain 05/22/2015    Tarsal tunnel syndrome 06/24/2014    Difficulty in walking 03/24/2014    Plantar fascial fibromatosis 08/83/6383    Umbilical hernia 25/76/1825    Atherosclerosis of arteries of extremities ([facility] ) 08/28/2013    Hallux abductovalgus with bunions, unspecified laterality 07/26/2013    Chronic gout without tophus 07/02/2013    Arthropathy of knee 09/04/2012    Rupture of popliteal cyst 09/04/2012    Anxiety 09/04/2012    Hiatal hernia 08/27/2012        King Yanci MD  PM&R Attending

## 2020-02-21 NOTE — PROGRESS NOTES
02/21/20 1330   Pain Assessment   Pain Assessment No/denies pain   Pain Score No Pain   Restrictions/Precautions   Precautions Bed/chair alarms;Cognitive; Fall Risk;Impulsive;Seizure;Supervision on toilet/commode  (crani prec, helmet OOB, HOB >30 at all times)   Weight Bearing Restrictions No   ROM Restrictions No   Braces or Orthoses Craniectomy Helmet   Comprehension   QI: Comprehension 3  Usually Understands: Understands most conversations, but misses some part/intent of message  Requires cues at times to understand  Comprehension (FIM) 4 - Understands basic info/conversation 75-90% of time   Expression   QI: Expression 3  Exhibits some difficulty with expressing needs and ideas (e g , some words or finishing thoughts) or speech is not clear   Expression (FIM) 5 - Needs help/cues only RARELY (< 10% of the time)   Social Interaction   Social Interaction (FIM) 4 - Needs redirecting for appropriate language or to initiate interaction   Problem Solving   Problem solving (FIM) 3 - Solves basic problmes 50-74% of time   Memory   Memory (FIM) 3 - Recognizes, recalls/performs 50-74%   Speech/Language/Cognition Assessmetn   Treatment Assessment Pt participated in skilled ST session targeting cognitive linguistic skills  She was very verbose and required several verbal cues to redirect attention to tx tasks  She was oriented X4 with min difficulty for date ("its' either the 20th or 21st")  Pt participated in discussion for call bell/fall risk/etc  She demonstrated good understanding for call bell and purpose  She was able to independently recall events from fall this morning  SLP provided education for importance for safety and carryover for call bell/fall precautions  Session began with a word deduction task given four clues  Of note, pt demonstrated significant tangential speech at beginning of this task suspect to decreased comprehension for directions   Given review of directions and MAX cueing, pt was able to demonstrated improved understanding for task objective  Pt named appropriate item in 10/15 trials independently, increased to 15/15 given min-mod contextual cues (max cues required for first two items)  Pt was then given mock medication labels and answered related questions in 9/14 trials independently, increasing to 14/14 given mod verbal and visual cues  Of note, pt with decreased attention to questions as evidenced by reading the entire label versus attempting to visually scan for needed information  Recommend continued skilled ST services to maximize cognitive linguistic skills at this time     SLP Therapy Minutes   SLP Time In 1330   SLP Time Out 1400   SLP Total Time (minutes) 30   SLP Mode of treatment - Individual (minutes) 30   SLP Mode of treatment - Concurrent (minutes) 0   SLP Mode of treatment - Group (minutes) 0   SLP Mode of treatment - Co-treat (minutes) 0   SLP Mode of Treatment - Total time(minutes) 30 minutes   SLP Cumulative Minutes 90

## 2020-02-22 PROCEDURE — 97116 GAIT TRAINING THERAPY: CPT

## 2020-02-22 PROCEDURE — 97530 THERAPEUTIC ACTIVITIES: CPT

## 2020-02-22 PROCEDURE — 97112 NEUROMUSCULAR REEDUCATION: CPT

## 2020-02-22 PROCEDURE — 97130 THER IVNTJ EA ADDL 15 MIN: CPT

## 2020-02-22 PROCEDURE — 97129 THER IVNTJ 1ST 15 MIN: CPT

## 2020-02-22 PROCEDURE — 97535 SELF CARE MNGMENT TRAINING: CPT

## 2020-02-22 RX ADMIN — BUMETANIDE 2 MG: 2 TABLET ORAL at 08:25

## 2020-02-22 RX ADMIN — HEPARIN SODIUM 5000 UNITS: 5000 INJECTION INTRAVENOUS; SUBCUTANEOUS at 21:57

## 2020-02-22 RX ADMIN — OXYCODONE HYDROCHLORIDE 2.5 MG: 5 TABLET ORAL at 22:58

## 2020-02-22 RX ADMIN — LEVOTHYROXINE SODIUM 112 MCG: 112 TABLET ORAL at 05:47

## 2020-02-22 RX ADMIN — HEPARIN SODIUM 5000 UNITS: 5000 INJECTION INTRAVENOUS; SUBCUTANEOUS at 14:16

## 2020-02-22 RX ADMIN — PANTOPRAZOLE SODIUM 40 MG: 40 TABLET, DELAYED RELEASE ORAL at 05:47

## 2020-02-22 RX ADMIN — SENNOSIDES AND DOCUSATE SODIUM 1 TABLET: 8.6; 5 TABLET ORAL at 08:23

## 2020-02-22 RX ADMIN — MELATONIN 3 MG: at 21:57

## 2020-02-22 RX ADMIN — LOSARTAN POTASSIUM 25 MG: 25 TABLET, FILM COATED ORAL at 08:23

## 2020-02-22 RX ADMIN — HEPARIN SODIUM 5000 UNITS: 5000 INJECTION INTRAVENOUS; SUBCUTANEOUS at 05:47

## 2020-02-22 RX ADMIN — ATORVASTATIN CALCIUM 40 MG: 40 TABLET, FILM COATED ORAL at 17:39

## 2020-02-22 RX ADMIN — ACETAMINOPHEN 650 MG: 325 TABLET ORAL at 21:58

## 2020-02-22 RX ADMIN — ACETAMINOPHEN 650 MG: 325 TABLET ORAL at 08:23

## 2020-02-22 RX ADMIN — AMLODIPINE BESYLATE 10 MG: 10 TABLET ORAL at 08:23

## 2020-02-22 NOTE — PROGRESS NOTES
Occupational Therapy Treatment Note       02/22/20 0900   Pain Assessment   Pain Assessment No/denies pain   Restrictions/Precautions   Precautions Bed/chair alarms;Cognitive; Fall Risk;Supervision on toilet/commode;Seizure  (CRANIECTOMY )   Braces or Orthoses Craniectomy Helmet   Lifestyle   Autonomy "I have no trouble with my memory"    Eating   Type of Assistance Needed Independent   Amount of Physical Assistance Provided No physical assistance   Eating CARE Score 6   Sit to Stand   Type of Assistance Needed Supervision; Adaptive equipment   Amount of Physical Assistance Provided No physical assistance   Comment RW   Sit to Stand CARE Score 4   Bed-Chair Transfer   Type of Assistance Needed Supervision; Adaptive equipment   Amount of Physical Assistance Provided No physical assistance   Comment RW   Chair/Bed-to-Chair Transfer CARE Score 4   Transfer Bed/Chair/Wheelchair   Positioning Concerns Cognition   Limitations Noted In Balance; Endurance;Problem Solving   Adaptive Equipment Roller Walker   Toileting Hygiene   Type of Assistance Needed Supervision; Adaptive equipment   Amount of Physical Assistance Provided No physical assistance   Toileting Hygiene CARE Score 4   Toilet Transfer   Type of Assistance Needed Supervision; Adaptive equipment   Amount of Physical Assistance Provided No physical assistance   Comment unilateral UE support on RW    Toilet Transfer CARE Score 4   Meal Prep   Meal Prep Level Walker   Meal Prep Level of Assistance Minimum assistance; Moderate verbal cues   Meal Preparation Engaged in meal preparation - cooking pasta on stovetop  Pt reports at baseline she cooks using oven/stove at home prior to her children being home for dinner  Pt normally sits on her rollator, however utilized RW with chair for pt to sit as needed as per chart review pt has poor carryover of rollator brake management  Pt limited to standing tolerance for 1-2 minutes at a time 2* pt reporting chronic low back pain   Pt states at home she will call her family to assist in carrying pot of water  Pt with poor carryover on using RW, instead uses countertop for UE support  During task, pt with poor attention, even after being told to attend to stovetop while water was waiting to boil  Pt noticed water was boiling after boiled over pot, required cue to set timer, stir pasta, and then indirect cue of "what still needs to be done?" to turn off stove  Of note, pt is able to recall long term memory of how to cook pasta and sequencing of events  However, due to pt's impaired attention, problem solving, executive functioning, OT is recommending pt have direct supervision for all meal preparation tasks as well as min assist as needed to retrieve items, which pt reports she occassionally needed at baseline  Kitchen Mobility   Kitchen Activity Retrieve items;Transport items   Kitchen Mobility Comments pt with poor carryover of using RW, holds onto countertop  Pt completes with close supervision  Cognition   Overall Cognitive Status Impaired   Arousal/Participation Alert; Cooperative   Attention Difficulty attending to directions   Orientation Level Oriented to person;Oriented to place;Oriented to situation   Memory Decreased recall of precautions;Decreased short term memory;Decreased recall of recent events   Following Commands Follows one step commands with increased time or repetition   Activity Tolerance   Activity Tolerance Patient tolerated treatment well   Assessment   Treatment Assessment Pt participated in skilled OT tx session focused on toileting, functional mobility/transfers within room, hot meal preparation using stove with focus on cognitive retraining  See above for further details on functional performance  Overall pt presents with poor insight into current cognitive deficits   OT repetitively educated pt that while she is able to recall knowledge using long term memory, her "brain has trouble with new learning" and short-term memory  Overall cognitive deficits in the following areas: short-term memory, insight, safety awareness, problem solving, executive functioning, cognitive flexibility  Based on chart review, PT [address] spoke with family who did not want family training to occur tomororw but on Friday  If family is present, OT to pull pt's family aside (but not friend who is visiting) to educate them further on pt's cognitive deficits and need for bed/chair alarms at home  Pt will continue to benefit from skilled OT intervention to address functional cognitive retraining, administration of ACLS, repetitive new learning on DME in order to maximize functional independence in ADLS, functional mobility/transfers, while decreasing burden of care  OT to also follow up with MD on shower orders, if/when pt will be appropriate to shower  Pt reports having tub with shower chair and no back at home, but states she could use her daughter's walk in shower  OT can practice dry tub and shower transfers for now  Plan for 24/7 supervision at home and continued outpatient OT for cognitive retraining as PTA pt was independent in all IADLS, including driving  Problem List Decreased strength;Decreased endurance; Impaired balance;Decreased mobility; Decreased cognition; Impaired judgement;Decreased safety awareness   Barriers to Discharge Decreased caregiver support; Inaccessible home environment   Plan   Treatment/Interventions ADL retraining;Functional transfer training; Therapeutic exercise; Endurance training;Cognitive reorientation;Patient/family training;Bed mobility; Equipment eval/education; Compensatory technique education   Progress Progressing toward goals   Recommendation   OT Discharge Recommendation 24 hour supervision/assist  (outpatient OT )   OT Therapy Minutes   OT Time In 0900   OT Time Out 1000   OT Total Time (minutes) 60   OT Mode of treatment - Individual (minutes) 60   OT Mode of treatment - Concurrent (minutes) 0   OT Mode of treatment - Group (minutes) 0   OT Mode of treatment - Co-treat (minutes) 0   OT Mode of Treatment - Total time(minutes) 60 minutes   OT Cumulative Minutes 330   Therapy Time missed   Time missed?  No       Elicia Villar MS, OTR/L , CBIS

## 2020-02-22 NOTE — PROGRESS NOTES
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  · Keppra completed  · Helmet when out of bed  · SBP goal < 160      HTN  · Remains stable on Amlodipine/Losartan  Was on a higher dose of losartan as an outpt (25mg 2x daily)  · Continue to hold outpt propranolol 40mg every 12 hours 2/2 bradycardia  · No changes today/at goal of <929 systolic     CKD stage III; baseline 1 1-1 3  · stable  · Follows with Dr Lieutenant Smith as an outpt      Chronic diastolic heart failure; LVEF 65%, mod-severe MR/mild-mod TR  · Continue Bumex 2mg daily  Was on a higher dose as an outpt (2mg daily except M/W/F take 2mg 2x daily)  · Stable  · Bmp monday     Leukocytosis  · Likely reactive/improving  · Trending down  · No signs of infx        The above assessment and plan was reviewed and updated as determined by my evaluation of the patient on 2/22/2020      Labs:   Results from last 7 days   Lab Units 02/20/20  0523 02/18/20  0449   WBC Thousand/uL 10 28* 10 98*   HEMOGLOBIN g/dL 11 3* 10 9*   HEMATOCRIT % 36 6 34 3*   PLATELETS Thousands/uL 195 174     Results from last 7 days   Lab Units 02/20/20  0524 02/18/20  0449   SODIUM mmol/L 140 139   POTASSIUM mmol/L 3 7 3 4*   CHLORIDE mmol/L 105 105   CO2 mmol/L 28 28   BUN mg/dL 15 17   CREATININE mg/dL 1 03 1 00   CALCIUM mg/dL 9 2 8 5                   Review of Scheduled Meds:    Current Facility-Administered Medications:  acetaminophen 650 mg Oral Q6H PRN Yaneli Costa MD   amLODIPine 10 mg Oral Daily Yaneli Costa MD   atorvastatin 40 mg Oral QPM Yaneli Costa MD   bisacodyl 10 mg Rectal Daily PRN Yaneli Costa MD   bumetanide 2 mg Oral Daily Yaneli Costa MD   heparin (porcine) 5,000 Units Subcutaneous [facility] Yaneli Costa MD levothyroxine 112 mcg Oral Early Morning Ilan Lilly MD   losartan 25 mg Oral Daily Ilan Lilly MD   melatonin 3 mg Oral HS Ilan Lilly MD   ondansetron 4 mg Oral Q6H PRN Ilan Lilly MD   oxyCODONE 2 5 mg Oral Q6H PRN Ilan Lilly MD   pantoprazole 40 mg Oral Early Morning Ilan Lilly MD   polyethylene glycol 17 g Oral Daily PRN Ilan Lilly MD   senna-docusate sodium 1 tablet Oral BID Ilan Llily MD       Subjective/ HPI: Patients overnight issues or events were reviewed with nursing or staff during rounds or morning huddle session  No new or overnight issues  Offers no complaints  Currently participating in therapy this a m     ROS:   A 10 point ROS was performed; negative except as noted above         Imaging:     No orders to display       *Labs /Radiology studiesReviewed  *Medications Reviewed and reconciled as needed  *Please refer to order section for additional ordered labs studies  *Case discussed with primary attending during morning huddle case rounds    Physical Examination:  Vitals:   Vitals:    02/21/20 0654 02/21/20 1429 02/21/20 2044 02/22/20 0547   BP: 157/67 151/67 142/60 158/65   BP Location: Right arm Right arm Right arm Right arm   Pulse: 80 83 77 64   Resp: 18 18 18 15   Temp: 97 8 °F (36 6 °C) 98 2 °F (36 8 °C) 98 1 °F (36 7 °C) 97 8 °F (36 6 °C)   TempSrc: Oral Oral Oral Oral   SpO2: 96% 98% 97% 97%   Weight:       Height:           General Appearance: no distress, conversive  HEENT: PERRLA, conjuctiva normal; oropharynx clear; mucous membranes moist;   Neck:  Supple, no lymphadenopathy or thyromegaly  Lungs: CTA, normal respiratory effort, no retractions, expiratory effort normal  CV: regular rate and rhythm , PMI normal; 2/6 systolic murmur   ABD: soft non tender, obese +BS x4  EXT: DP pulses intact,  no edema  Skin: normal turgor, normal texture, no rash  Psych: affect normal, mood normal  Neuro: AAOx3; helmet in place          The above physical exam was reviewed and updated as determined by my evaluation of the patient on 2/22/2020  Invasive Devices     None                    VTE Pharmacologic Prophylaxis: Heparin  Code Status: Level 3 - DNAR and DNI  Current Length of Stay: 4 day(s)      Total time spent:  30 minutes with more than 50% spent counseling/coordinating care  Counseling includes discussion with patient re: progress  and discussion with patient of his/her current medical state/information  Coordination of patient's care was performed in conjunction with primary service  Time invested included review of patient's labs, vitals, and management of their comorbidities with continued monitoring  In addition, this patient was discussed with medical team including physician and advanced extenders  The care of the patient was extensively discussed and appropriate treatment plan was formulated unique for this patient  ** Please Note:  voice to text software may have been used in the creation of this document   Although proof errors in transcription or interpretation are a potential of such software**

## 2020-02-22 NOTE — PROGRESS NOTES
02/22/20 1030   Pain Assessment   Pain Assessment No/denies pain   Pain Score No Pain   Restrictions/Precautions   Precautions Bed/chair alarms;Cognitive; Fall Risk;Seizure;Supervision on toilet/commode   Braces or Orthoses Craniectomy Helmet   Comprehension   Comprehension (FIM) 4 - Understands basic info/conversation 75-90% of time   Expression   Expression (FIM) 5 - Needs help/cues only RARELY (< 10% of the time)   Social Interaction   Social Interaction (FIM) 5 - Requires redirection but less than 10% of the time  Problem Solving   Problem solving (FIM) 3 - Solves basic problmes 50-74% of time   Memory   Memory (FIM) 3 - Recognizes, recalls/performs 50-74%   Speech/Language/Cognition Assessmetn   Treatment Assessment Pt participated in skilled ST session focusing on cognitive linguistic skills  Engaged in a basic money management task, pt demonstrated ability to calculate the total of three written amounts of coins in 9/15 trials, independently  Required min to moderate verbal cuing in initial trials to ID errors to which pt then began writing down amounts which assisted her in completing calculations w/ coins to achieve 15/15 accuracy  Pt also engaged in medication management discussion as pt was independent in managing her own medications  When reviewing her current medication list, pt able to accurately state the reason/use of 4/9 medications, requiring education for other meds  Of note, despite education, pt w/ decreased memory and seconds/minutes later pt was errored in recall of their use  Pt also w/ inconsistent ability to ID which medications she was previously taking at home vs the medications which were new to her  However, pt was able to provide increased detail in regards to time of day to take and special instructions for 2/9 meds  Discussed plan to complete tangible medication management task in follow up sessions  Pt noted to remain tangential this session but was more easily redirected  Recommending continued skilled ST services at this time to maximize functional cognitive linguistic skills for increased safety and independence  SLP Therapy Minutes   SLP Time In 56   SLP Time Out 1100   SLP Total Time (minutes) 30   SLP Mode of treatment - Individual (minutes) 30   SLP Mode of treatment - Concurrent (minutes) 0   SLP Mode of treatment - Group (minutes) 0   SLP Mode of treatment - Co-treat (minutes) 0   SLP Mode of Treatment - Total time(minutes) 30 minutes   SLP Cumulative Minutes 120   Therapy Time missed   Time missed?  No

## 2020-02-23 PROCEDURE — 97116 GAIT TRAINING THERAPY: CPT

## 2020-02-23 PROCEDURE — 97530 THERAPEUTIC ACTIVITIES: CPT

## 2020-02-23 PROCEDURE — 96125 COGNITIVE TEST BY HC PRO: CPT

## 2020-02-23 PROCEDURE — 97130 THER IVNTJ EA ADDL 15 MIN: CPT

## 2020-02-23 PROCEDURE — 97129 THER IVNTJ 1ST 15 MIN: CPT

## 2020-02-23 PROCEDURE — 97112 NEUROMUSCULAR REEDUCATION: CPT

## 2020-02-23 RX ADMIN — HEPARIN SODIUM 5000 UNITS: 5000 INJECTION INTRAVENOUS; SUBCUTANEOUS at 21:07

## 2020-02-23 RX ADMIN — PANTOPRAZOLE SODIUM 40 MG: 40 TABLET, DELAYED RELEASE ORAL at 06:07

## 2020-02-23 RX ADMIN — HEPARIN SODIUM 5000 UNITS: 5000 INJECTION INTRAVENOUS; SUBCUTANEOUS at 06:07

## 2020-02-23 RX ADMIN — HEPARIN SODIUM 5000 UNITS: 5000 INJECTION INTRAVENOUS; SUBCUTANEOUS at 14:58

## 2020-02-23 RX ADMIN — BUMETANIDE 2 MG: 2 TABLET ORAL at 08:25

## 2020-02-23 RX ADMIN — SENNOSIDES AND DOCUSATE SODIUM 1 TABLET: 8.6; 5 TABLET ORAL at 08:27

## 2020-02-23 RX ADMIN — ATORVASTATIN CALCIUM 40 MG: 40 TABLET, FILM COATED ORAL at 17:53

## 2020-02-23 RX ADMIN — MELATONIN 3 MG: at 21:07

## 2020-02-23 RX ADMIN — LOSARTAN POTASSIUM 25 MG: 25 TABLET, FILM COATED ORAL at 08:27

## 2020-02-23 RX ADMIN — AMLODIPINE BESYLATE 10 MG: 10 TABLET ORAL at 08:26

## 2020-02-23 RX ADMIN — LEVOTHYROXINE SODIUM 112 MCG: 112 TABLET ORAL at 06:07

## 2020-02-23 RX ADMIN — SENNOSIDES AND DOCUSATE SODIUM 1 TABLET: 8.6; 5 TABLET ORAL at 17:53

## 2020-02-23 NOTE — OCCUPATIONAL THERAPY NOTE
OT NOTE:       Pt's son Alysia Lanier and daughter in law were present, requesting to speak outside of pt's room  OT with family from 12:30-1:15, Speech therapy Ilana Dickerson present from at 12:45-1:30 as well  OT educated family on pt's cognitive deficits, as well as review of [place] Cognitive Assessment and ACLS scoring  Educated them on pt's poor insight and anticipation that pt will not ask for assistance at home  OT recommending 24/7 direct supervision and bed/chair alarms and provided family with example for how to order online, as well as educated them on recommendation for video baby monitor  Family is receptive, however they report that this is very overwhelming for them and they do not want to put this burden entirely on their grandson as he is interviewing for jobs  They report CM provided list of home health aides, but they would also like list of adult day programs  Pt's daughter in law is also going to contact her human resources about possible FMLA  Per chart review, OT reitterated plan for D/C home Saturday with [facility]  They will be present with grandson Friday from 9AM-2 PM for family training with OT/PT/ST/RN

## 2020-02-23 NOTE — PROGRESS NOTES
Internal Medicine Progress Note  Patient: Davey English  Age/sex: [de-identified] y o  female  Medical Record #: [id number]      ASSESSMENT/PLAN: (Interval History)  Davey English is seen and examined and management for following issues:    Rt parietal/temporal ICH; s/p Rt decompressive hemicraniectomy 2/11/20  · Keppra completed  · Helmet when out of bed  · SBP goal < 160      HTN  · Remains stable on Amlodipine/Losartan  Was on a higher dose of losartan as an outpt (25mg 2x daily)  · Continue to hold outpt propranolol 40mg every 12 hours 2/2 bradycardia  · No changes at goal of <450 systolic     CKD stage III; baseline 1 1-1 3  · stable  · Follows with Dr Charis Pallas as an outpt      Chronic diastolic heart failure; LVEF 65%, mod-severe MR/mild-mod TR  · Continue Bumex 2mg daily  Was on a higher dose as an outpt (2mg daily except M/W/F take 2mg 2x daily)  · Stable  · Bmp monday     Leukocytosis  · Likely reactive/improving  · Trending down  · No signs of infx        The above assessment and plan was reviewed and updated as determined by my evaluation of the patient on 2/23/2020      Labs:   Results from last 7 days   Lab Units 02/20/20  0523 02/18/20  0449   WBC Thousand/uL 10 28* 10 98*   HEMOGLOBIN g/dL 11 3* 10 9*   HEMATOCRIT % 36 6 34 3*   PLATELETS Thousands/uL 195 174     Results from last 7 days   Lab Units 02/20/20  0524 02/18/20  0449   SODIUM mmol/L 140 139   POTASSIUM mmol/L 3 7 3 4*   CHLORIDE mmol/L 105 105   CO2 mmol/L 28 28   BUN mg/dL 15 17   CREATININE mg/dL 1 03 1 00   CALCIUM mg/dL 9 2 8 5                   Review of Scheduled Meds:    Current Facility-Administered Medications:  acetaminophen 650 mg Oral Q6H PRN Shruthi Ochoa MD   amLODIPine 10 mg Oral Daily Shruthi Ochoa MD   atorvastatin 40 mg Oral QPM Shruthi Ochoa MD   bisacodyl 10 mg Rectal Daily PRN Shruthi Ochoa MD   bumetanide 2 mg Oral Daily Shruthi Ochoa MD   heparin (porcine) 5,000 Units Subcutaneous [facility] Shruthi Ochoa MD levothyroxine 112 mcg Oral Early Morning Teodora Gabriel MD   losartan 25 mg Oral Daily Teodora Gabriel MD   melatonin 3 mg Oral HS Teodora Gabriel MD   ondansetron 4 mg Oral Q6H PRN Teodora Gabriel MD   oxyCODONE 2 5 mg Oral Q6H PRN Teodora Gabriel MD   pantoprazole 40 mg Oral Early Morning Teodora Gabriel MD   polyethylene glycol 17 g Oral Daily PRN Teodora Gabriel MD   senna-docusate sodium 1 tablet Oral BID Teodora Gabriel MD       Subjective/ HPI: Patients overnight issues or events were reviewed with nursing or staff during rounds or morning huddle session  No new or overnight issues  Offers no complaints  Pleasant, out of bed in chair with helmet on, no complaints and slept well    ROS:   A 10 point ROS was performed; negative except as noted above         Imaging:     No orders to display       *Labs /Radiology studiesReviewed  *Medications Reviewed and reconciled as needed  *Please refer to order section for additional ordered labs studies  *Case discussed with primary attending during morning huddle case rounds    Physical Examination:  Vitals:   Vitals:    02/22/20 0547 02/22/20 1300 02/22/20 2100 02/23/20 0712   BP: 158/65 132/61 120/57 144/64   BP Location: Right arm Right arm Right arm Right arm   Pulse: 64 78 76 75   Resp: 15 18 18 18   Temp: 97 8 °F (36 6 °C) 97 5 °F (36 4 °C) 98 °F (36 7 °C) 97 5 °F (36 4 °C)   TempSrc: Oral Oral Oral Oral   SpO2: 97%  96% 97%   Weight:       Height:           General Appearance: no distress, conversive  HEENT: PERRLA, conjuctiva normal; oropharynx clear; mucous membranes moist;   Neck:  Supple, no lymphadenopathy or thyromegaly  Lungs: CTA, normal respiratory effort, no retractions, expiratory effort normal  CV: regular rate and rhythm , PMI normal; 2/6 systolic murmur  ABD: soft non tender, obese +BS x4  EXT: DP pulses intact,  no edema  Skin: normal turgor, normal texture, no rash  Psych: affect normal, mood normal  Neuro: AAOx3; helmet in place             The above physical exam was reviewed and updated as determined by my evaluation of the patient on 2/23/2020  Invasive Devices     None                    VTE Pharmacologic Prophylaxis: Heparin  Code Status: Level 3 - DNAR and DNI  Current Length of Stay: 5 day(s)      Total time spent:  30 minutes with more than 50% spent counseling/coordinating care  Counseling includes discussion with patient re: progress  and discussion with patient of his/her current medical state/information  Coordination of patient's care was performed in conjunction with primary service  Time invested included review of patient's labs, vitals, and management of their comorbidities with continued monitoring  In addition, this patient was discussed with medical team including physician and advanced extenders  The care of the patient was extensively discussed and appropriate treatment plan was formulated unique for this patient  ** Please Note:  voice to text software may have been used in the creation of this document   Although proof errors in transcription or interpretation are a potential of such software**

## 2020-02-23 NOTE — PROGRESS NOTES
02/23/20 0830   Pain Assessment   Pain Assessment No/denies pain   Pain Score No Pain   Restrictions/Precautions   Precautions Bed/chair alarms;Cognitive; Fall Risk;Seizure;Supervision on toilet/commode  (Craniectomy, HOB >30* at all times )   Weight Bearing Restrictions No   ROM Restrictions No   Braces or Orthoses Craniectomy Helmet   Cognition   Overall Cognitive Status Impaired   Arousal/Participation Alert; Cooperative   Attention Difficulty attending to directions   Memory Decreased short term memory;Decreased recall of recent events;Decreased recall of precautions   Subjective   Subjective Pt reports slept poorly last night states she was feeling restless  Agreeable to PT session    Sit to Stand   Type of Assistance Needed Supervision; Adaptive equipment   Amount of Physical Assistance Provided No physical assistance   Comment RW or Rollator, cues for safety    Sit to Stand CARE Score 4   Bed-Chair Transfer   Type of Assistance Needed Supervision; Adaptive equipment;Verbal cues   Amount of Physical Assistance Provided No physical assistance   Comment RW or rollator, cues for safety with device managment    Chair/Bed-to-Chair Transfer CARE Score 4   Transfer Bed/Chair/Wheelchair   Limitations Noted In Balance;Problem Solving; Sequencing;UE Strength;LE Strength; Other  (safety awareness )   Adaptive Equipment Roller Walker;Rollator   Findings Overall Min/Mod verbal cues for safety awarenss and device maangment    Car Transfer   Type of Assistance Needed Adaptive equipment; Incidental touching;Verbal cues   Amount of Physical Assistance Provided No physical assistance   Comment CGA with rollator, cues for hand placment and technique    Car Transfer CARE Score 4   Walk 10 Feet   Type of Assistance Needed Adaptive equipment;Supervision   Amount of Physical Assistance Provided No physical assistance   Comment CS w RW or rollator    Walk 10 Feet CARE Score 4   Walk 50 Feet with Two Turns   Type of Assistance Needed Supervision; Adaptive equipment   Amount of Physical Assistance Provided No physical assistance   Comment CS w RW or rollator    Walk 50 Feet with Two Turns CARE Score 4   Walk 150 Feet   Type of Assistance Needed Supervision; Adaptive equipment   Amount of Physical Assistance Provided No physical assistance   Comment CS w RW or rollator    Walk 150 Feet CARE Score 4   Walking 10 Feet on Uneven Surfaces   Type of Assistance Needed Physical assistance; Adaptive equipment   Amount of Physical Assistance Provided Less than 25%   Comment on ramp in PT gym with rollator pt states uses rollator or cane when outside of home    Walking 10 Feet on Uneven Surfaces CARE Score 3   Ambulation   Does the patient walk? 2  Yes   Primary Mode of Locomotion Prior to Admission Walk   Distance Walked (feet) 150 ft  (x2, 50x2, short distances in PT gym no AD )   Assist Device Roller Walker;Rollator   Gait Pattern Inconsistant Kathy; Forward Flexion; Step through   Limitations Noted In Balance; Endurance; Heel Strike; Safety   Walk Assist Level Close Supervision   Findings CS with RW or rollator  COntinued to require Zina for cues when managing rollator walker  Practiced pt sitting on rollator while placed up against wall which she states she does at home when needed rest break    Wheel 50 Feet with Two Turns   Reason if not Attempted Activity not applicable   Wheel 50 Feet with Two Turns CARE Score 9   Wheel 150 Feet   Reason if not Attempted Activity not applicable   Wheel 303 Feet CARE Score 9   Wheelchair mobility   Does the patient use a wheelchair? 0  No   Curb or Single Stair   Style negotiated Single stair   Type of Assistance Needed Adaptive equipment;Physical assistance   Amount of Physical Assistance Provided Less than 25%   Comment Zina on 6"  step BHR reciprocal pattern      1 Step (Curb) CARE Score 3   4 Steps   Reason if not Attempted Activity not applicable   4 Steps CARE Score 9   12 Steps   Reason if not Attempted Activity not applicable   12 Steps CARE Score 9   Stairs   Type Stairs; Ramp   # of Steps 2   Weight Bearing Precautions Fall Risk   Assist Devices Bilateral Rail   Findings Adelaida on 6"  step BHR reciprocal pattern  Therapeutic Interventions   Flexibility Seated BLE PROM hs and gastroc x4min    Neuromuscular Re-Education 30x2 with no AD in PT gym pt reaching for furniture and counter stating she never walks without holding onto something  Had pt cleaning down counter top, mat table  and placing pillow cases on pillows  ambulation with Rollator weaving through cones 50x2  ambulation in hallway searching for and gathering cones [id number] then palcing on rollator and transoporting back to gym  Other had pt practicing transitioning from ambulation to seated on rollator during ambulation trials  Assessment   Treatment Assessment Session focus on fucntional mobility, dynamic balance and fucntional tasks  Pt overall CS with rollator or RW  Rollator requires more cueing for safety however more functional for trasnporting items around house and providing seat for pt to utilize  [id number] where pt sat down while folding pillow cases and after car txfr on seat of rollator without locking breaks or placing up against steady surface  Discussed safety concerns and although pts rollator does not have breaks at home pt needs to back it up agaisnt wall or counter before sitting but pt stating "as long as i hold onto the handles as i sit im fine"  Continues with dec safety awareness and insight into deficits  Pt will require 24/7 S at home following d/c for safety   Cont to benefit form skilled PT with focus on inc safety awareness, balance, righting reactions, functional mobility and trasnfer to maximize functional indpendence and overalls afety    Family/Caregiver Present no    Barriers to Discharge Inaccessible home environment;Decreased caregiver support   PT Barriers   Physical Impairment Decreased strength;Decreased endurance; Impaired balance;Decreased mobility; Decreased coordination;Decreased cognition; Impaired judgement;Decreased safety awareness   Functional Limitation Car transfers; Ramp negotiation;Standing;Stair negotiation; Walking;Transfers   Plan   Treatment/Interventions Functional transfer training;LE strengthening/ROM; Elevations; Therapeutic exercise; Endurance training;Cognitive reorientation;Patient/family training;Equipment eval/education; Bed mobility;Gait training   Progress Progressing toward goals   Recommendation   Recommendation 24 hour supervision/assist   Equipment Recommended Walker   PT Therapy Minutes   PT Time In 0830   PT Time Out 0930   PT Total Time (minutes) 60   PT Mode of treatment - Individual (minutes) 60   PT Mode of treatment - Concurrent (minutes) 0   PT Mode of treatment - Group (minutes) 0   PT Mode of treatment - Co-treat (minutes) 0   PT Mode of Treatment - Total time(minutes) 60 minutes   PT Cumulative Minutes 330   Therapy Time missed   Time missed?  No

## 2020-02-23 NOTE — PROGRESS NOTES
Occupational Therapy Treatment Note       02/23/20 1000   Pain Assessment   Pain Assessment No/denies pain   Restrictions/Precautions   Precautions Bed/chair alarms;Cognitive; Fall Risk;Supervision on toilet/commode  (craniectomy; HOB > 30 degrees at a;; to,es )   Braces or Orthoses Craniectomy Helmet   Lifestyle   Autonomy "this will be easy because I sewed for 30 years" - when beginning ACLS   Grooming   Findings supervision hand hygiene and verbal cue to use soap while in stance at sink    Sit to Stand   Type of Assistance Needed Supervision; Adaptive equipment   Amount of Physical Assistance Provided No physical assistance   Comment RW   Sit to Stand CARE Score 4   Bed-Chair Transfer   Type of Assistance Needed Supervision; Adaptive equipment   Amount of Physical Assistance Provided No physical assistance   Comment RW- pt requires overall min verbal cues in room to appropriately use RW and keep wheels on floor  When in tight spaces, pt requires mod verbal cues to navigate appropriately with RW  Chair/Bed-to-Chair Transfer CARE Score 4   Transfer Bed/Chair/Wheelchair   Positioning Concerns Cognition   Limitations Noted In Endurance;Balance;Problem Solving   Adaptive Equipment [id number] Hygiene   Type of Assistance Needed Supervision; Adaptive equipment   Amount of Physical Assistance Provided No physical assistance   Comment RW   Toileting Hygiene CARE Score 4   Toilet Transfer   Type of Assistance Needed Supervision; Adaptive equipment   Amount of Physical Assistance Provided No physical assistance   Comment unilateral UE support on grab bar, uses RW    Toilet Transfer CARE Score 4   Cognition   Overall Cognitive Status Impaired   Arousal/Participation Alert; Cooperative   Attention Difficulty attending to directions   Orientation Level Oriented to person;Oriented to place;Oriented to situation   Memory Decreased short term memory;Decreased recall of precautions;Decreased recall of recent events Following Commands Follows one step commands with increased time or repetition   Comments Of note, at beginning of session pt speaking on cell phone with a friend, OT notified pt it was time for therapy  Pt states "okay" but continues to speak on the phone, did not realize the need to stop conversation in order to come to therapy  OT then stated "can you say goodbye and come down to the gym for therapy?", pt states "oh yes!" and puts phone down without saying goodbye or hanging phone up, OT needed to provide verbal cue for pt to say goodbye to end her phone call, in which then said goodbye to her friend but did not hang up cell phone  OT Administered Large Junior Cognitive Level Screen (ACLS), pt able to see holes and identify front of leather as brown and back as black  Pt scored 3 4/6 0 indicating 24 hour care is recommended to sequence through routine steps of toileting, bathing, grooming and dressing  See below for further details on functional implications  Of note, pt's example of cognitive deficits in attention and problem solving during telephone call do reflect in pt's score under "telephone" section where states - "May forget to hang up "  Activity Tolerance   Activity Tolerance Patient tolerated treatment well   Assessment   Treatment Assessment Pt participated in skilled OT tx session focused on OT administration of Large ACLS and toileting  See above for further details on functional performance  Pt presents with impairments in the following areas of cognition: attention, divided attention, problem solving, executive functioning, short term memory, cognitive flexibility, safety awareness, insight, judgement  Based on cognitive deficits noted during functional tx interventions, as well as formal cognitive assessment, OT is recommending pt have 24/7 close supervision to ensure safety at home and be able to provide pt with appropriate cues as needed, as well as assist with all IADLS   OT to follow up with PT regarding pt's safety with mobility using rollator, as pt reporting she does not use/"have" brakes on her rollator at home, in which case based on current cognitive deficits OT would currently recommend using RW and sitting in chair only when needing to take rest breaks, instead of sitting on a rollator with no brakes  Pt will continue to benefit from skilled OT intervention to address functional cognitive retraining with focus on attention, short-term memory, repetitive training on new learning,  in order to maximize functional independence in ADLS, functional mobility/transfers and IADLS, while decreasing burden of care  OT called pt's son Jakub Cabral who will be in at 12:00 today, OT to meet with family regarding pt's cognitive deficits and recommendations for 24/7 supervision at home and alarms  Problem List Decreased strength;Decreased endurance; Impaired balance;Decreased mobility; Decreased cognition; Impaired judgement;Decreased safety awareness   Barriers to Discharge Decreased caregiver support; Inaccessible home environment   Plan   Treatment/Interventions Functional transfer training;ADL retraining; Therapeutic exercise;Cognitive reorientation;Patient/family training;Equipment eval/education; Bed mobility; Compensatory technique education   Progress Progressing toward goals   Recommendation   OT Discharge Recommendation 24 hour supervision/assist  (outpatient OT )   OT Therapy Minutes   OT Time In 1000   OT Time Out 1030   OT Total Time (minutes) 30   OT Mode of treatment - Individual (minutes) 30   OT Mode of treatment - Concurrent (minutes) 0   OT Mode of treatment - Group (minutes) 0   OT Mode of treatment - Co-treat (minutes) 0   OT Mode of Treatment - Total time(minutes) 30 minutes   OT Cumulative Minutes 360   Therapy Time missed   Time missed? No       3 4    Administered Shelia Darden Cognitive Level Screen (ACLS)    Pt scored 3 4/6 0 indicating 24 hour care is recommended to sequence through routine steps of toileting, bathing, grooming and dressing  Behavior:  Speaks without considering comprehension of listener  May be distractible  Grooming:  Spontaneously sustains actions of combing, brushing, shaving with electric razor or applying makeup  Uses too much or too little toothpaste/makeup  Looks at objects but fails to note effects  Restrict access to harmful objects  Dressing:  Selects items laid out and begins to don garments  May pick several items and be unable to decide  May quit before finished or don several layers  May not pay attention to condition of garments (cleanliness or need for repair)  Bathing:  Picks up washcloth, soap, towel and wipes easy to reach body parts  May wash in one spot, forget to use soap, may not remove all dirt or quit before task is complete  Patient should not be left alone during bathing tasks  Walking and exercising:  Ambulates within 2 or 3 familiar rooms to access desirable activities  Can alter ambulation pace but is easily distracted  May be impulsive when changing positions  Has difficulty walking and talking at the same time  Is at risk for falls  Eating:  May anticipate meal times based on familiar signs (activity in kitchen)  Uses all utensils except knife  Rate may be rushed and may eat strongly preferred items only  Failure to observe manners may alienate others  Check food and beverage temperature  Cannot follow dietary restrictions  Toileting:  Recognizes need to void and goes to familiar bathrooms  May not close door while in bathroom  Dons and doffs clothing slowly, may need help with unusual fasteners  Wipes but does not check results or wipes repeatedly using excess toilet paper  May forget to wash hands  May leave zipper open  May need reminders to toilet in order to avoid accidents  Medications: May recognize medications by color or shape when it is given daily but does not note amounts or time of day    Does not understand purpose of medications or side effects, may mistake for candy  Prescription bottles need to be child proof  Store medications in secure location away from patient  Pre-measured medications should be handed to the patient  Use of adaptive devices: May be able to propel a wheelchair but cannot get around furniture and may get lost if allowed outside  May not be able to utilize adaptive devices in safe manner  May not be able to learn use of new adaptive device  Housekeeping:  No awareness of need for housekeeping  May  cloth and begin action of cleaning  Does not note results and may quit when distracted  Meal Preparation:  Does not plan for food  May eat from refrigerator  May be able to replicate repetitive actions for simple meal prep with supervision  May be impulsive and require frequent redirection  Restrict access to sharp tools and hot objects/surfaces  Spending money:  May recognize local currency  May hand money to another person in a familiar exchange situation; however, may not attend to amount given/received  May not understand money is owed for services  May loose money  Should have assistance for all finances  Shopping: Follows a guide to shopping areas  Looks in windows or at displays with no intent to purchase  May take items without paying  May fail to notice price or if they have enough money to pay  Do not leave alone in shopping areas  Laundry:  May not recognize clothing is dirty  Has no awareness of methods of doing laundry  May do repetitive actions but may not be able to sequence through steps of task  Traveling: May be able to sit for 30 minutes in a car  May not be aware of destination  May recognize features on a familiar route  May attempt to enter or leave car before it is fully stopped  Use child safety locks  Telephone:  Able to  phone when it rings and say hello    May be able to call for another person or hang up when phone is not for them  Not able to take messages  May dial 1 or 2 known numbers, but may call for no reason  May forget to hang up     Driving:  Should not operate a motor vehicle       [employer], MS, OTR/L , CBIS

## 2020-02-23 NOTE — PROGRESS NOTES
02/23/20 1030   Pain Assessment   Pain Assessment No/denies pain   Pain Score No Pain   Restrictions/Precautions   Precautions Bed/chair alarms;Cognitive; Fall Risk;Impulsive;Supervision on toilet/commode  (craniectomy; HOB > 30 degrees at all times)   Braces or Orthoses Craniectomy Helmet   Comprehension   Comprehension (FIM) 4 - Understands basic info/conversation 75-90% of time   Expression   Expression (FIM) 5 - Needs help/cues only RARELY (< 10% of the time)   Social Interaction   Social Interaction (FIM) 5 - Requires redirection but less than 10% of the time  Problem Solving   Problem solving (FIM) 3 - Solves basic problmes 50-74% of time   Memory   Memory (FIM) 3 - Recognizes, recalls/performs 50-74%   Speech/Language/Cognition Assessmetn   Treatment Assessment Pt participated in skilled ST session focusing on cognitive linguistic skills w/ specific focus on medication management as it was found that pt will have newly prescribed medications to take since this admission  Pt able to elicit names of only some medications but w/ decreased accuracy of the reason/use of new meds  When medication management completed w/ OT on 2/20/2020, pt able to complete but requiring cues to maintain focus  During this session, in an attempt to again simulate medication management but w/ her current medication regimen, pt reported to SLP that she uses a two row pill organizer/box (row for day and row for night), therefore, this was used during session  Pt demonstrating ability to accurately read all directions and ask appropriate questions regarding medications, however, during task, pt's deficits in attention significantly impacted accuracy of ability to correctly set up pills  Pt correctly set up 4/7 pills, requiring verbal cues to maintain attention   However, errors that occurred w/ other pills include: setting up a 1x/day pill for 2x/day instead, stating that she had already set up a pill for the week when she had not yet set it up, and setting up a PM pill in the AM slot  Pt consistently requiring verbal cues to task in general due to tangential speech and perseverating on her thought that she will be "fine" at home  Pt w/ significant deficits in insight into current cognitive linguistic deficits as SLP attempted to continue to educate on deficits which are mainly in attention, problem solving/safety and STM recall  Pt continued to state that she trusts herself to do these things (e g , medications, cooking, etc), even if staff does not trust that she can complete these tasks  SLP educated on impact of stroke/brain injury on cognition to which pt is suspected to have limited understanding and insight as pt continued to talk throughout education, as well as when SLP attempted to speak at all  SLP to follow up w/ pt's family regarding recommendations related to cognitive linguistic deficits  Will continue to recommend skilled ST services at this time to maximize functional cognitive linguistic skills, as well as to improve safety and level of independence  SLP Therapy Minutes   SLP Time In 56   SLP Time Out 1100   SLP Total Time (minutes) 30   SLP Mode of treatment - Individual (minutes) 30   SLP Mode of treatment - Concurrent (minutes) 0   SLP Mode of treatment - Group (minutes) 0   SLP Mode of treatment - Co-treat (minutes) 0   SLP Mode of Treatment - Total time(minutes) 30 minutes   SLP Cumulative Minutes 150   Therapy Time missed   Time missed?  No

## 2020-02-24 ENCOUNTER — APPOINTMENT (INPATIENT)
Dept: RADIOLOGY | Facility: HOSPITAL | Age: 80
DRG: 056 | End: 2020-02-24
Payer: MEDICARE

## 2020-02-24 LAB
ANION GAP SERPL CALCULATED.3IONS-SCNC: 6 MMOL/L (ref 4–13)
BASOPHILS # BLD AUTO: 0.04 THOUSANDS/ΜL (ref 0–0.1)
BASOPHILS NFR BLD AUTO: 0 % (ref 0–1)
BUN SERPL-MCNC: 20 MG/DL (ref 5–25)
CALCIUM SERPL-MCNC: 9.1 MG/DL (ref 8.3–10.1)
CHLORIDE SERPL-SCNC: 101 MMOL/L (ref 100–108)
CO2 SERPL-SCNC: 29 MMOL/L (ref 21–32)
CREAT SERPL-MCNC: 1.03 MG/DL (ref 0.6–1.3)
EOSINOPHIL # BLD AUTO: 0.15 THOUSAND/ΜL (ref 0–0.61)
EOSINOPHIL NFR BLD AUTO: 1 % (ref 0–6)
ERYTHROCYTE [DISTWIDTH] IN BLOOD BY AUTOMATED COUNT: 16.3 % (ref 11.6–15.1)
GFR SERPL CREATININE-BSD FRML MDRD: 51 ML/MIN/1.73SQ M
GLUCOSE P FAST SERPL-MCNC: 110 MG/DL (ref 65–99)
GLUCOSE SERPL-MCNC: 110 MG/DL (ref 65–140)
HCT VFR BLD AUTO: 34.1 % (ref 34.8–46.1)
HGB BLD-MCNC: 10.6 G/DL (ref 11.5–15.4)
IMM GRANULOCYTES # BLD AUTO: 0.17 THOUSAND/UL (ref 0–0.2)
IMM GRANULOCYTES NFR BLD AUTO: 1 % (ref 0–2)
LYMPHOCYTES # BLD AUTO: 1.08 THOUSANDS/ΜL (ref 0.6–4.47)
LYMPHOCYTES NFR BLD AUTO: 9 % (ref 14–44)
MCH RBC QN AUTO: 27.1 PG (ref 26.8–34.3)
MCHC RBC AUTO-ENTMCNC: 31.1 G/DL (ref 31.4–37.4)
MCV RBC AUTO: 87 FL (ref 82–98)
MONOCYTES # BLD AUTO: 0.81 THOUSAND/ΜL (ref 0.17–1.22)
MONOCYTES NFR BLD AUTO: 7 % (ref 4–12)
NEUTROPHILS # BLD AUTO: 10.15 THOUSANDS/ΜL (ref 1.85–7.62)
NEUTS SEG NFR BLD AUTO: 82 % (ref 43–75)
NRBC BLD AUTO-RTO: 0 /100 WBCS
PLATELET # BLD AUTO: 206 THOUSANDS/UL (ref 149–390)
PMV BLD AUTO: 11.1 FL (ref 8.9–12.7)
POTASSIUM SERPL-SCNC: 4.3 MMOL/L (ref 3.5–5.3)
RBC # BLD AUTO: 3.91 MILLION/UL (ref 3.81–5.12)
SODIUM SERPL-SCNC: 136 MMOL/L (ref 136–145)
WBC # BLD AUTO: 12.4 THOUSAND/UL (ref 4.31–10.16)

## 2020-02-24 PROCEDURE — 70450 CT HEAD/BRAIN W/O DYE: CPT

## 2020-02-24 PROCEDURE — 85025 COMPLETE CBC W/AUTO DIFF WBC: CPT | Performed by: NURSE PRACTITIONER

## 2020-02-24 PROCEDURE — 97129 THER IVNTJ 1ST 15 MIN: CPT

## 2020-02-24 PROCEDURE — 80048 BASIC METABOLIC PNL TOTAL CA: CPT | Performed by: NURSE PRACTITIONER

## 2020-02-24 PROCEDURE — 97530 THERAPEUTIC ACTIVITIES: CPT

## 2020-02-24 PROCEDURE — 97130 THER IVNTJ EA ADDL 15 MIN: CPT

## 2020-02-24 PROCEDURE — 99232 SBSQ HOSP IP/OBS MODERATE 35: CPT | Performed by: PHYSICAL MEDICINE & REHABILITATION

## 2020-02-24 PROCEDURE — 97110 THERAPEUTIC EXERCISES: CPT

## 2020-02-24 PROCEDURE — 97535 SELF CARE MNGMENT TRAINING: CPT

## 2020-02-24 RX ADMIN — LEVOTHYROXINE SODIUM 112 MCG: 112 TABLET ORAL at 05:24

## 2020-02-24 RX ADMIN — MELATONIN 3 MG: at 21:16

## 2020-02-24 RX ADMIN — SENNOSIDES AND DOCUSATE SODIUM 1 TABLET: 8.6; 5 TABLET ORAL at 09:02

## 2020-02-24 RX ADMIN — PANTOPRAZOLE SODIUM 40 MG: 40 TABLET, DELAYED RELEASE ORAL at 05:24

## 2020-02-24 RX ADMIN — ATORVASTATIN CALCIUM 40 MG: 40 TABLET, FILM COATED ORAL at 18:14

## 2020-02-24 RX ADMIN — BUMETANIDE 2 MG: 2 TABLET ORAL at 09:03

## 2020-02-24 RX ADMIN — AMLODIPINE BESYLATE 10 MG: 10 TABLET ORAL at 09:02

## 2020-02-24 RX ADMIN — LOSARTAN POTASSIUM 25 MG: 25 TABLET, FILM COATED ORAL at 09:02

## 2020-02-24 RX ADMIN — HEPARIN SODIUM 5000 UNITS: 5000 INJECTION INTRAVENOUS; SUBCUTANEOUS at 21:16

## 2020-02-24 RX ADMIN — ACETAMINOPHEN 650 MG: 325 TABLET ORAL at 07:13

## 2020-02-24 RX ADMIN — SENNOSIDES AND DOCUSATE SODIUM 1 TABLET: 8.6; 5 TABLET ORAL at 18:14

## 2020-02-24 RX ADMIN — HEPARIN SODIUM 5000 UNITS: 5000 INJECTION INTRAVENOUS; SUBCUTANEOUS at 14:58

## 2020-02-24 RX ADMIN — HEPARIN SODIUM 5000 UNITS: 5000 INJECTION INTRAVENOUS; SUBCUTANEOUS at 05:24

## 2020-02-24 NOTE — PROGRESS NOTES
Occupational Therapy Treatment Note       02/24/20 6435   Pain Assessment   Pain Assessment No/denies pain   Restrictions/Precautions   Precautions Bed/chair alarms;Cognitive; Fall Risk;Seizure;Supervision on toilet/commode  (HOB > 30 degrees)   Braces or Orthoses Craniectomy Helmet   Lifestyle   Autonomy "I am not stupid"    Roll Left and Right   Type of Assistance Needed Supervision   Amount of Physical Assistance Provided No physical assistance   Roll Left and Right CARE Score 4   Sit to Stand   Type of Assistance Needed Supervision; Adaptive equipment   Amount of Physical Assistance Provided No physical assistance   Comment RW   Sit to Stand CARE Score 4   Bed-Chair Transfer   Type of Assistance Needed Supervision; Adaptive equipment   Amount of Physical Assistance Provided No physical assistance   Comment RW   Chair/Bed-to-Chair Transfer CARE Score 4   Transfer Bed/Chair/Wheelchair   Positioning Concerns Cognition   Limitations Noted In Balance; Endurance;Problem Solving   Toileting Hygiene   Type of Assistance Needed Supervision; Adaptive equipment   Amount of Physical Assistance Provided No physical assistance   Toileting Hygiene CARE Score 4   Toilet Transfer   Type of Assistance Needed Supervision; Adaptive equipment   Amount of Physical Assistance Provided No physical assistance   Toilet Transfer CARE Score 4   Meal Prep   Meal Prep Level Walker   Meal Prep Level of Assistance Minimal verbal cues; Minimum assistance   Meal Preparation Engaged in meal preparation - baking cookies - for functional cognitive retraining  Pt able to read ingredients from bag, verbally is able to recall need to preheat oven, but requires 1x cue to turn off oven when task finished  Pt limited in standing tolerance, which she reports is at baseline and requires seated rest breaks every 1-2 minutes as well as sitting to complete stirring and placing cookies onto tray      Kitchen Mobility   Kitchen-Mobility Level Walker   Kitchen Activity Retrieve items   Kitchen Mobility Comments Kitchen mobility using RW  Pt requires verbal cues to utilize RW appropriately and not just hold onto one side of RW when reaching for items in refrigerator   Cognition   Overall Cognitive Status Impaired   Arousal/Participation Alert; Cooperative   Attention Attends with cues to redirect   Orientation Level Oriented to person;Oriented to place;Oriented to situation   Memory Decreased recall of precautions;Decreased recall of recent events;Decreased short term memory   Following Commands Follows one step commands with increased time or repetition   Comments Engaged in repetitive training of stroke injury and pt's current deficits to increase pt's overall insight into current situation  OT yogi diagram for pt using terms of brain being "like a highway" with roads, and stroke being "like a car accident" in order for pt to be able to visually see  Educated pt on deficits in "short term memory" and "thinking"  OT explained to pt 5x that while she is able to remember "things you know from a long time ago" that her brain has "trouble with new learning"  At end of session pt is able to verbalize diagram to pt as well as to her daughter in law  OT also educated pt on BE FAST for signs/symptoms of stroke  Pt will continue to benefit from repetitive training on her current deficits and stroke, to increase her insight into deficits  Pt engaged in emergency preparedness situation training, pt able to appropriately state when to call 911 in response to having chest pain, or being unable to wake up a family member  Activity Tolerance   Activity Tolerance Patient tolerated treatment well   Assessment   Treatment Assessment Pt participated in skilled OT tx session focused on functional cognitive retraining, meal preparation, kitchen mobility  See above for further details on functional performance   Pt's daughter in law present at end of session and provided OT with FMLA paperwork which OT handed to ANDREA Fernandez and ANDREA Fernandez to follow up with daughter in law  OT continues to recommend 24/7 direct supervision and chair/bed alarms as well as video monitor 2* pt's cognitive deficits  With repetitive training pt able to verbalize better understanding of her what a stroke is, however continues to demonstrate poor insight into her current deficits  Pt will continue to benefit from skilled OT intervention to address ADL (OT to follow up with MD regarding if/when pt can have shower orders per neurosx), shower transfer, repetitive training on safe use of RW in order to maximize functional independence in ADLS, functional mobility/transfers, while decreasing burden of care  Problem List Decreased endurance; Impaired balance;Decreased mobility; Decreased cognition; Impaired judgement;Decreased safety awareness   Barriers to Discharge Decreased caregiver support; Inaccessible home environment   Plan   Treatment/Interventions ADL retraining;Functional transfer training; Therapeutic exercise; Endurance training;Cognitive reorientation;Patient/family training;Equipment eval/education; Bed mobility; Compensatory technique education   Progress Progressing toward goals   Recommendation   OT Discharge Recommendation 24 hour supervision/assist  (outpatient OT )   OT Therapy Minutes   OT Time In 1315   OT Time Out 1445   OT Total Time (minutes) 90   OT Mode of treatment - Individual (minutes) 90   OT Mode of treatment - Concurrent (minutes) 0   OT Mode of treatment - Group (minutes) 0   OT Mode of treatment - Co-treat (minutes) 0   OT Mode of Treatment - Total time(minutes) 90 minutes   OT Cumulative Minutes 450   Therapy Time missed   Time missed?  No

## 2020-02-24 NOTE — PROGRESS NOTES
02/24/20 1000   Pain Assessment   Pain Assessment 0-10   Pain Score No Pain   Effect of Pain on Daily Activities  reports chronic LBP however does not enumerate; limits overall tolerance to standing and walking    Restrictions/Precautions   Precautions Bed/chair alarms;Cognitive; Fall Risk;Seizure;Supervision on toilet/commode  (HOB elevated > 30*)   Weight Bearing Restrictions No   ROM Restrictions No   Braces or Orthoses Craniectomy Helmet   Cognition   Overall Cognitive Status Impaired   Arousal/Participation Alert; Cooperative   Attention Difficulty attending to directions   Orientation Level Oriented to person;Oriented to place;Oriented to situation   Memory Decreased recall of recent events;Decreased short term memory   Following Commands Follows one step commands with increased time or repetition   Subjective   Subjective Patient with no new complaints  Mahi Craze to go home  Roll Left and Right   Type of Assistance Needed Supervision   Amount of Physical Assistance Provided Less than 25%   Roll Left and Right CARE Score 3   Sit to Lying   Type of Assistance Needed Supervision   Amount of Physical Assistance Provided No physical assistance   Sit to Lying CARE Score 4   Lying to Sitting on Side of Bed   Type of Assistance Needed Supervision   Amount of Physical Assistance Provided No physical assistance   Lying to Sitting on Side of Bed CARE Score 4   Sit to Stand   Type of Assistance Needed Supervision; Adaptive equipment   Amount of Physical Assistance Provided No physical assistance   Sit to Stand CARE Score 4   Bed-Chair Transfer   Type of Assistance Needed Supervision; Adaptive equipment   Amount of Physical Assistance Provided No physical assistance   Chair/Bed-to-Chair Transfer CARE Score 4   Transfer Bed/Chair/Wheelchair   Limitations Noted In Endurance; Coordination;Pain Management   Adaptive Equipment Roller Walker;Rollator   Car Transfer   Type of Assistance Needed Set-up / clean-up;Supervision   Amount of Physical Assistance Provided No physical assistance   Car Transfer CARE Score 4   Walk 10 Feet   Type of Assistance Needed Supervision; Adaptive equipment   Amount of Physical Assistance Provided No physical assistance   Walk 10 Feet CARE Score 4   Walk 50 Feet with Two Turns   Type of Assistance Needed Supervision; Adaptive equipment   Amount of Physical Assistance Provided No physical assistance   Walk 50 Feet with Two Turns CARE Score 4   Walk 150 Feet   Type of Assistance Needed Supervision; Adaptive equipment   Amount of Physical Assistance Provided No physical assistance   Walk 150 Feet CARE Score 4   Ambulation   Does the patient walk? 2  Yes   Wheel 50 Feet with Two Turns   Reason if not Attempted Activity not applicable   Wheel 50 Feet with Two Turns CARE Score 9   Wheel 150 Feet   Reason if not Attempted Activity not applicable   Wheel 100 Feet CARE Score 9   Wheelchair mobility   Does the patient use a wheelchair? 0  No   Curb or Single Stair   Style negotiated Curb   Type of Assistance Needed Physical assistance   Amount of Physical Assistance Provided Less than 25%   Comment Zina for safety for proper technique   1 Step (Curb) CARE Score 3   4 Steps   Reason if not Attempted Activity not applicable   4 Steps CARE Score 9   12 Steps   Reason if not Attempted Activity not applicable   12 Steps CARE Score 9   Stairs   Type Curb;Stairs   # of Steps 2  (stairs + curb )   Weight Bearing Precautions Fall Risk   Assist Devices Roller Walker;Bilateral Rail   Findings CS on 2 steps/ Zina on curb step with regualr RW    Toilet Transfer   Type of Assistance Needed Supervision; Adaptive equipment   Amount of Physical Assistance Provided No physical assistance   Toilet Transfer CARE Score 4   Therapeutic Interventions   Strengthening supine/seated LE therex 10x3: LAQ, DF/PF, heel slide with 2# weights bilaterally , SLR, ABD/ADD   Flexibility BLE heel cord and HS seated gentle stretch TER 3 minutes    Balance cleaning mat table via parking walker and side steps    Other reviewed donning/doffing helmet with use of handheld mirror to assist with seeing fastens    Equipment Use   NuStep BLE/UE, level 0 (2* fatigue) 5 minutes    Assessment   Treatment Assessment Patient engaged in 90 minute PT treatment session focusing on improving overall function and safety  Patient poorly tolerates extended standing and ambulating and uses her Rolator to rest  She requires reinforcement to place device against support to prevent it from moving as she sits; demonstrating poor safety awareness  Patient also poorly tolerates 90 minute session as she fatigues easily  Incorporating supine and seated therex and theraputic rests throughout  Reviewed proper donning/doffing of helmet with patient able to (I) mange fasten using hand held mirror for better view  She continues to demonstrate poor attention to task and requires instruction to focus  She will require 24 hour S at time of discharge; family training to be performed Friday with discharge expected for Saturday  No additional therapy services required at discharge  Problem List Decreased strength;Decreased endurance; Impaired balance;Decreased safety awareness;Decreased cognition   Barriers to Discharge Inaccessible home environment;Decreased caregiver support   PT Barriers   Functional Limitation Ramp negotiation;Stair negotiation   Plan   Treatment/Interventions Functional transfer training;LE strengthening/ROM; Elevations; Therapeutic exercise;Cognitive reorientation; Endurance training;Patient/family training;Equipment eval/education; Bed mobility;Gait training; Compensatory technique education   Progress Progressing toward goals   Recommendation   Recommendation 24 hour supervision/assist   Equipment Recommended Walker   PT Therapy Minutes   PT Time In 1000   PT Time Out 1130   PT Total Time (minutes) 90   PT Mode of treatment - Individual (minutes) 90   PT Mode of treatment - Concurrent (minutes) 0   PT Mode of treatment - Group (minutes) 0   PT Mode of treatment - Co-treat (minutes) 0   PT Mode of Treatment - Total time(minutes) 90 minutes   PT Cumulative Minutes 420

## 2020-02-24 NOTE — PROGRESS NOTES
PM&R Progress Note:    CC: f/u IPH    Subjective: no acute events overnight  Reports itchiness over scalp incision, wearing helmet today  No new weakness/changes in sensation  Review of Systems:   Review of Systems   Constitutional: Negative  HENT: Negative  Eyes: Negative  Respiratory: Negative  Cardiovascular: Negative  Gastrointestinal: Negative  Endocrine: Negative  Genitourinary: Negative  Musculoskeletal: Negative  Skin: Negative  Allergic/Immunologic: Negative  Neurological: Negative  Hematological: Negative  Psychiatric/Behavioral: Negative  Objective:   /60 (BP Location: Left arm)   Pulse 80   Temp 98 1 °F (36 7 °C) (Oral)   Resp 18   Ht 5' 1" (1 549 m)   Wt 92 kg (202 lb 13 2 oz)   SpO2 95%   BMI 38 32 kg/m²     Physical Exam   Constitutional: She is oriented to person, place, and time  She appears well-developed and well-nourished  HENT:   Staples on scalp visualized, clean dry and intact   Eyes: Pupils are equal, round, and reactive to light  EOM are normal    Cardiovascular: Normal rate and regular rhythm  Pulmonary/Chest: Breath sounds normal  She has no wheezes  She has no rales  Abdominal: Soft  Bowel sounds are normal  She exhibits no distension  There is no tenderness  Musculoskeletal: Normal range of motion  Neurological: She is alert and oriented to person, place, and time  Follows simple commands  Fluent speech  Moving all extremities spontaneously and antigravity  Skin: Skin is warm  Psychiatric: She has a normal mood and affect  Nursing note and vitals reviewed  Assessment/Plan: Louise Warner is a [de-identified] y o  female with HTN, CHF, hypothyroidism, CKD 3, HLD who presented to the [address] on 2/10/20 with mental status changes, vomiting, and headache  She was found to have a 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    She required a craniectomy and evacuation performed by Dr Nick Mansfield on 2/11/20  Post-operatively patient completed 7 days of Keppra for seizure ppx  Maintained in a cranial helmet while OOB  Cleared for SQ Heparin for DVT ppx  Further work-up revealed no mass, ischemia or critical stenosis  It was recommended to repeat CTH in 1 month  Patient found to have functional deficits from her baseline  Admitted to [facility] on 2/18/20         Plan:   · Rehabilitation plan:  Functional deficits:  Cognitive deficits, generalized weakness  [address] [place] [address], Ne = 14/30  Patient will likely require 24/7 care at home upon discharge  Continue current PT/OT/SLP plan  · 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 by Dr Nick Mansfield on 2/11/20  Cranial precautions  +Helmet while OOB  F/U CTH in 1 month 3/11/20  Will contact neurosurgery prior to discharge to re-examine incision and remove staples if indicated  · HTN: managed on Norvasc, Cozaar  · CHF: combined - managed on Bumex    ECHO showed an EF of 65%  · GERD: managed on Protonix (therapeutic sub for omeprazole)  · Hypothyroidism: managed on Synthroid  · HLD: managed on Lipitor  · CKD 3: baseline Cr = 1 1 - 1 3  · DVT ppx: managed on SQ Heparin 5000 units Q8h and SCDs  · Appreciate [facility] medicine consultants medical comanagement  · Disposition: Reteam        Lab Results   Component Value Date    WBC 12 40 (H) 02/24/2020    HGB 10 6 (L) 02/24/2020    HCT 34 1 (L) 02/24/2020    MCV 87 02/24/2020     02/24/2020     Lab Results   Component Value Date    SODIUM 136 02/24/2020    K 4 3 02/24/2020     02/24/2020    CO2 29 02/24/2020    BUN 20 02/24/2020    CREATININE 1 03 02/24/2020    GLUC 110 02/24/2020    CALCIUM 9 1 02/24/2020     Lab Results   Component Value Date    INR 1 13 02/10/2020    INR 1 01 03/09/2016    PROTIME 13 9 02/10/2020    PROTIME 10 7 03/09/2016           Current Facility-Administered Medications:     acetaminophen (TYLENOL) tablet 650 mg, 650 mg, Oral, Q6H PRN, King Yanci MD, 650 mg at 02/24/20 0713    amLODIPine (NORVASC) tablet 10 mg, 10 mg, Oral, Daily, Jenna Whaley MD, 10 mg at 02/24/20 0902    atorvastatin (LIPITOR) tablet 40 mg, 40 mg, Oral, QPM, Jenna Whaley MD, 40 mg at 02/23/20 1753    bisacodyl (DULCOLAX) rectal suppository 10 mg, 10 mg, Rectal, Daily PRN, Jenna Whaley MD    bumetanide (BUMEX) tablet 2 mg, 2 mg, Oral, Daily, Jenna Whaley MD, 2 mg at 02/24/20 9983    heparin (porcine) subcutaneous injection 5,000 Units, 5,000 Units, Subcutaneous, Q8H [address], Jenna Whaley MD, 5,000 Units at 02/24/20 0524    levothyroxine tablet 112 mcg, 112 mcg, Oral, Early Morning, Jenna Whaley MD, 112 mcg at 02/24/20 0524    losartan (COZAAR) tablet 25 mg, 25 mg, Oral, Daily, Jenna Whaley MD, 25 mg at 02/24/20 0902    melatonin tablet 3 mg, 3 mg, Oral, HS, Jenna Whaley MD, 3 mg at 02/23/20 2107    ondansetron (ZOFRAN-ODT) dispersible tablet 4 mg, 4 mg, Oral, Q6H PRN, Jenna Whaley MD    oxyCODONE (ROXICODONE) IR tablet 2 5 mg, 2 5 mg, Oral, Q6H PRN, Jenna Whaley MD, 2 5 mg at 02/22/20 2258    pantoprazole (PROTONIX) EC tablet 40 mg, 40 mg, Oral, Early Morning, Jenna Whaley MD, 40 mg at 02/24/20 0524    polyethylene glycol (MIRALAX) packet 17 g, 17 g, Oral, Daily PRN, Jenna Whaley MD    senna-docusate sodium (SENOKOT S) 8 6-50 mg per tablet 1 tablet, 1 tablet, Oral, BID, Jenna Whaley MD, 1 tablet at 02/24/20 2367    Past Medical History:   Diagnosis Date    Anxiety     Arthritis     joints    Cataract     Disease of thyroid gland     Eczema     GERD (gastroesophageal reflux disease)     Gout     Heart failure ([facility] )     Hepatitis     Hiatal hernia     Hypertension     Onychomycosis     last assessed: 4/29/16    Orthopnea     resolved: 1/8/16    Sleep apnea     wears CPAP       Patient Active Problem List    Diagnosis Date Noted    Intraparenchymal hemorrhage of brain ([facility] ) 02/10/2020    Peripheral arteriosclerosis ([facility] ) 06/21/2019    Osteoporosis screening 03/08/2019    Eczema 12/03/2018    SOB (shortness of breath) 10/26/2018    Radiculopathy of lumbar region 10/02/2018    Corns 10/02/2018    Gastroesophageal reflux disease without esophagitis 06/04/2018    Iron deficiency anemia secondary to inadequate dietary iron intake 06/04/2018    Pain in both feet 03/15/2018    Alkaline phosphatase elevation 03/12/2018    Lumbar disc herniation with radiculopathy 12/08/2017    Hyperlipemia 11/15/2017    Chronic low back pain 10/24/2017    Hyperuricemia 06/23/2017    Pseudogout of left wrist 06/23/2017    Nephrolithiasis 06/21/2017    Acute on chronic diastolic congestive heart failure ([facility] [address] ) 06/02/2017    Chronic venous insufficiency 06/02/2017    Elevated triglycerides with high cholesterol 06/02/2017    CKD (chronic kidney disease), stage III ([facility]) 05/19/2017    Benign essential hypertension 08/22/2016    Chronic diastolic (congestive) heart failure ([facility] [facility] ) 08/22/2016    Hypothyroidism 08/22/2016    Morbid obesity due to excess calories ([facility] ) 08/20/2016    Diverticulitis of colon 08/18/2016    WENDI (obstructive sleep apnea) 05/20/2016    Onychomycosis 04/29/2016    Morbid obesity with body mass index of 40 0-44 9 in adult [facility]) 03/10/2016    Knee pain 05/22/2015    Tarsal tunnel syndrome 06/24/2014    Difficulty in walking 03/24/2014    Plantar fascial fibromatosis 87/58/8154    Umbilical hernia 91/14/2927    [facility]ros[facility] of arteries of [facility] ([facility] 08/28/2013    Hallux abductovalgus with bunions, unspecified laterality 07/26/2013    Chronic gout without tophus 07/02/2013    Arthropathy of knee 09/04/2012    Rupture of popliteal cyst 09/04/2012    Anxiety 09/04/2012    Hiatal hernia 08/27/2012        Leigha Duenas MD  PM&R Attending

## 2020-02-25 PROCEDURE — 97535 SELF CARE MNGMENT TRAINING: CPT

## 2020-02-25 PROCEDURE — 97129 THER IVNTJ 1ST 15 MIN: CPT

## 2020-02-25 PROCEDURE — 97530 THERAPEUTIC ACTIVITIES: CPT

## 2020-02-25 PROCEDURE — 99024 POSTOP FOLLOW-UP VISIT: CPT | Performed by: NEUROLOGICAL SURGERY

## 2020-02-25 PROCEDURE — 99232 SBSQ HOSP IP/OBS MODERATE 35: CPT | Performed by: PHYSICAL MEDICINE & REHABILITATION

## 2020-02-25 PROCEDURE — 97116 GAIT TRAINING THERAPY: CPT

## 2020-02-25 PROCEDURE — 97130 THER IVNTJ EA ADDL 15 MIN: CPT

## 2020-02-25 PROCEDURE — 97110 THERAPEUTIC EXERCISES: CPT

## 2020-02-25 RX ADMIN — LOSARTAN POTASSIUM 25 MG: 25 TABLET, FILM COATED ORAL at 08:03

## 2020-02-25 RX ADMIN — AMLODIPINE BESYLATE 10 MG: 10 TABLET ORAL at 08:03

## 2020-02-25 RX ADMIN — PANTOPRAZOLE SODIUM 40 MG: 40 TABLET, DELAYED RELEASE ORAL at 05:32

## 2020-02-25 RX ADMIN — LEVOTHYROXINE SODIUM 112 MCG: 112 TABLET ORAL at 05:32

## 2020-02-25 RX ADMIN — SENNOSIDES AND DOCUSATE SODIUM 1 TABLET: 8.6; 5 TABLET ORAL at 08:03

## 2020-02-25 RX ADMIN — HEPARIN SODIUM 5000 UNITS: 5000 INJECTION INTRAVENOUS; SUBCUTANEOUS at 05:32

## 2020-02-25 RX ADMIN — MELATONIN 3 MG: at 22:43

## 2020-02-25 RX ADMIN — HEPARIN SODIUM 5000 UNITS: 5000 INJECTION INTRAVENOUS; SUBCUTANEOUS at 22:43

## 2020-02-25 RX ADMIN — BUMETANIDE 2 MG: 2 TABLET ORAL at 08:03

## 2020-02-25 RX ADMIN — HEPARIN SODIUM 5000 UNITS: 5000 INJECTION INTRAVENOUS; SUBCUTANEOUS at 13:42

## 2020-02-25 RX ADMIN — ATORVASTATIN CALCIUM 40 MG: 40 TABLET, FILM COATED ORAL at 17:33

## 2020-02-25 RX ADMIN — SENNOSIDES AND DOCUSATE SODIUM 1 TABLET: 8.6; 5 TABLET ORAL at 17:33

## 2020-02-25 NOTE — PLAN OF CARE
Problem: Potential for Falls  Goal: Patient will remain free of falls  Description  INTERVENTIONS:  - Assess patient frequently for physical needs  -  Identify cognitive and physical deficits and behaviors that affect risk of falls    -  [place] fall precautions as indicated by assessment   - Educate patient/family on patient safety including physical limitations  - Instruct patient to call for assistance with activity based on assessment  - Modify environment to reduce risk of injury  - Consider OT/PT consult to assist with strengthening/mobility  Outcome: Progressing     Problem: PAIN - ADULT  Goal: Verbalizes/displays adequate comfort level or baseline comfort level  Description  Interventions:  - Encourage patient to monitor pain and request assistance  - Assess pain using appropriate pain scale  - Administer analgesics based on type and severity of pain and evaluate response  - Implement non-pharmacological measures as appropriate and evaluate response  - Consider cultural and social influences on pain and pain management  - Notify physician/advanced practitioner if interventions unsuccessful or patient reports new pain  Outcome: Progressing     Problem: INFECTION - ADULT  Goal: Absence or prevention of progression during hospitalization  Description  INTERVENTIONS:  - Assess and monitor for signs and symptoms of infection  - Monitor lab/diagnostic results  - Monitor all insertion sites, i e  indwelling lines, tubes, and drains  - Monitor endotracheal if appropriate and nasal secretions for changes in amount and color  - [place] appropriate cooling/warming therapies per order  - Administer medications as ordered  - Instruct and encourage patient and family to use good hand hygiene technique  - Identify and instruct in appropriate isolation precautions for identified infection/condition  Outcome: Progressing  Goal: Absence of fever/infection during neutropenic period  Description  INTERVENTIONS:  - Monitor WBC    Outcome: Progressing     Problem: SAFETY ADULT  Goal: Maintain or return to baseline ADL function  Description  INTERVENTIONS:  -  Assess patient's ability to carry out ADLs; assess patient's baseline for ADL function and identify physical deficits which impact ability to perform ADLs (bathing, care of mouth/teeth, toileting, grooming, dressing, etc )  - Assess/evaluate cause of self-care deficits   - Assess range of motion  - Assess patient's mobility; develop plan if impaired  - Assess patient's need for assistive devices and provide as appropriate  - Encourage maximum independence but intervene and supervise when necessary  - Involve family in performance of ADLs  - Assess for home care needs following discharge   - Consider OT consult to assist with ADL evaluation and planning for discharge  - Provide patient education as appropriate  Outcome: Progressing  Goal: Maintain or return mobility status to optimal level  Description  INTERVENTIONS:  - Assess patient's baseline mobility status (ambulation, transfers, stairs, etc )    - Identify cognitive and physical deficits and behaviors that affect mobility  - Identify mobility aids required to assist with transfers and/or ambulation (gait belt, sit-to-stand, lift, walker, cane, etc )  - [place] fall precautions as indicated by assessment  - Record patient progress and toleration of activity level on Mobility SBAR; progress patient to next Phase/Stage  - Instruct patient to call for assistance with activity based on assessment  - Consider rehabilitation consult to assist with strengthening/weightbearing, etc   Outcome: Progressing     Problem: DISCHARGE PLANNING  Goal: Discharge to home or other facility with appropriate resources  Description  INTERVENTIONS:  - Identify barriers to discharge w/patient and caregiver  - Arrange for needed discharge resources and transportation as appropriate  - Identify discharge learning needs (meds, wound care, etc )  - Arrange for interpretive services to assist at discharge as needed  - Refer to Case Management Department for coordinating discharge planning if the patient needs post-hospital services based on physician/advanced practitioner order or complex needs related to functional status, cognitive ability, or social support system  Outcome: Progressing     Problem: Prexisting or High Potential for Compromised Skin Integrity  Goal: Skin integrity is maintained or improved  Description  INTERVENTIONS:  - Identify patients at risk for skin breakdown  - Assess and monitor skin integrity  - Assess and monitor nutrition and hydration status  - Monitor labs   - Assess for incontinence   - Turn and reposition patient  - Assist with mobility/ambulation  - Relieve pressure over bony prominences  - Avoid friction and shearing  - Provide appropriate hygiene as needed including keeping skin clean and dry  - Evaluate need for skin moisturizer/barrier cream  - Collaborate with interdisciplinary team   - Patient/family teaching  - Consider wound care consult   Outcome: Progressing     Problem: Nutrition/Hydration-ADULT  Goal: Nutrient/Hydration intake appropriate for improving, restoring or maintaining nutritional needs  Description  Monitor and assess patient's nutrition/hydration status for malnutrition  Collaborate with interdisciplinary team and initiate plan and interventions as ordered  Monitor patient's weight and dietary intake as ordered or per policy  Utilize nutrition screening tool and intervene as necessary  Determine patient's food preferences and provide high-protein, high-caloric foods as appropriate       INTERVENTIONS:  - Monitor oral intake, urinary output, labs, and treatment plans  - Assess nutrition and hydration status and recommend course of action  - Evaluate amount of meals eaten  - Assist patient with eating if necessary   - Allow adequate time for meals  - Recommend/ encourage appropriate diets, oral nutritional supplements, and vitamin/mineral supplements  - Order, calculate, and assess calorie counts as needed  - Recommend, monitor, and adjust tube feedings and TPN/PPN based on assessed needs  - Assess need for intravenous fluids  - Provide specific nutrition/hydration education as appropriate  - Include patient/family/caregiver in decisions related to nutrition  Outcome: Progressing

## 2020-02-25 NOTE — PROGRESS NOTES
Score 9   Stairs   # of Steps 4   Therapeutic Interventions   Strengthening LAQ x 30, Seated hip flex x 30   Flexibility passive stretch B HS and calves   Other Worked on functional mobility with rollator, practicing sitting down on seat in santos, managing brakes   Assessment   Treatment Assessment Pt participating very well  Occassional reminder for unlocking brakes on rollator but pt able to consistently lock brakes approrpiately throughout session  NO LOB noted with pt talking while perfroming all tasks  Recommendation   Recommendation Home with family support;24 hour supervision/assist   PT Therapy Minutes   PT Time In 1230   PT Time Out 1330   PT Total Time (minutes) 60   PT Mode of treatment - Individual (minutes) 60   PT Mode of treatment - Concurrent (minutes) 0   PT Mode of treatment - Group (minutes) 0   PT Mode of treatment - Co-treat (minutes) 0   PT Mode of Treatment - Total time(minutes) 60 minutes   PT Cumulative Minutes 480   Therapy Time missed   Time missed?  No

## 2020-02-25 NOTE — PROGRESS NOTES
Progress Note - [facility] [place] 1940, [de-identified] y o  female MRN: [id number]    Unit/Bed#: [facility] [id number] Encounter: [id number]    Primary Care Provider: Sowmya Morgan MD   Date and time admitted to hospital: 2020  2:07 PM        * Intraparenchymal hemorrhage of brain [facility])  Assessment & Plan  POD 14 right decompressive hemicraniectomy for IPH evacuation    Imagin2020 - CT head wo: status post right-sided decompressive hemicraniectomy for a previously identified right parietotemporal lobe intraparenchymal hematoma  Interval evolution and resolution of previously seen intraparenchymal hemorrhage with residual edema remaining  No evidence for new acute intracranial hemorrhage  Interval removal of the previously seen drainage catheters  Thin subgaleal and small extra-axial collections are noted  Plan:  STAT CT head without contrast if decline in GCS >2pts/1h  Removed all staples and 2 sutures  Continue with craniectomy precautions, helmet when out of bed  Completed 1 week of Keppra for seizure ppx  DVT ppx: SCDs, HSQ  Continue therapies while in [facility]  Neurosurgery will see PRN remainder of the hospitalization  She will follow up as scheduled with repeat CT head  Subjective/Objective   Chief Complaint: "I'm doing good, just tired"    Subjective: patient states she has no complaints just admits to being tired  She denies any headaches, dizziness, change in vision, chest pain, SOB, abdominal pain/N/V, weakness  Objective: laying in bed, NAD  I/O       7238 -  07 07 07 -  0700    P  O  440 300 280    Total Intake(mL/kg) 440 (4 8) 300 (3 3) 280 (3)    Net +440 +300 +280           Unmeasured Urine Occurrence 3 x 3 x           Invasive Devices     None                 Physical Exam:  Vitals: Blood pressure 121/56, pulse 90, temperature 98 3 °F (36 8 °C), temperature source Oral, resp   rate 16, height 5' 1" (1 549 m), weight 92 kg (202 lb 13 2 oz), SpO2 96 %, not currently breastfeeding  ,Body mass index is 38 32 kg/m²  General appearance: alert, appears stated age, cooperative and no distress  Head: Normocephalic, right craniectomy flap is full, soft  Incision well approximated with staples, no erythema or edema  Eyes: EOMI  Lungs: non labored breathing  Heart: regular heart rate  Neurologic:   Mental status: Alert, oriented x3, thought content appropriate  Speech is fluent, clear  Able to identify 3/3 objects  Cranial nerves: grossly intact (Cranial nerves II-XII)  Facial symmetry at rest and with expression  Tongue is midline  Sensory: normal to LT in the face, bilateral upper and lower extremities  DST intact  Motor: moving all extremities without focal weakness, strength 5/5  Reflexes: 1+ and symmetric  negative morales, negative clonus  Coordination: finger to nose normal bilaterally, no drift bilaterally      Lab Results:  Results from last 7 days   Lab Units 02/24/20  0846 02/20/20  0523   WBC Thousand/uL 12 40* 10 28*   HEMOGLOBIN g/dL 10 6* 11 3*   HEMATOCRIT % 34 1* 36 6   PLATELETS Thousands/uL 206 195   NEUTROS PCT % 82*  --    MONOS PCT % 7  --      Results from last 7 days   Lab Units 02/24/20  0651 02/20/20  0524   POTASSIUM mmol/L 4 3 3 7   CHLORIDE mmol/L 101 105   CO2 mmol/L 29 28   BUN mg/dL 20 15   CREATININE mg/dL 1 03 1 03   CALCIUM mg/dL 9 1 9 2                 No results found for: TROPONINT  ABG:  Lab Results   Component Value Date    PHART 7 485 (H) 02/12/2020    VDL9JOL 33 8 (L) 02/12/2020    PO2ART 84 0 02/12/2020    QSR1JOQ 24 9 02/12/2020    BEART 1 7 02/12/2020    SOURCE Line, Arterial 02/12/2020       Imaging Studies: I have personally reviewed pertinent reports  and I have personally reviewed pertinent films in PACS  CT head wo contrast   Final Result      Status post right-sided decompressive hemicraniectomy for a previously identified right parietotemporal lobe intraparenchymal hematoma          Interval evolution and resolution of the previously seen intraparenchymal hemorrhage with residual edema remaining  No evidence for new acute intracranial hemorrhage  Interval removal of the previously seen drainage catheters  Thin subgaleal and small extra-axial collections are noted  Workstation performed: [id number]               EKG, Pathology, and Other Studies: I have personally reviewed pertinent reports        VTE  Prophylaxis: Sequential compression device (Venodyne)  and Heparin

## 2020-02-25 NOTE — PROGRESS NOTES
Internal Medicine Progress Note  Patient: Sebastian Schwarz  Age/sex: [de-identified] y o  female  Medical Record #: [id number]      ASSESSMENT/PLAN: (Interval History)  Sebastian Schwarz is seen and examined and management for following issues:    Rt parietal/temporal ICH; s/p Rt decompressive hemicraniectomy 2/11/20  · Keppra completed  · Helmet when out of bed  · SBP goal < 160      HTN  · Remains stable on Amlodipine/Losartan  Was on a higher dose of losartan as an outpt (25mg 2x daily)  · Continue to hold outpt propranolol 40mg every 12 hours 2/2 bradycardia  · No changes at goal of <953 systolic     CKD stage III; baseline 1 1-1 3  · stable  · Follows with Dr Shira Foster as an outpt      Chronic diastolic heart failure; LVEF 65%, mod-severe MR/mild-mod TR  · Continue Bumex 2mg daily  Was on a higher dose as an outpt (2mg daily except M/W/F take 2mg 2x daily)  · Stable     Leukocytosis  · Likely reactive  · No signs of infx      The above assessment and plan was reviewed and updated as determined by my evaluation of the patient on 2/25/2020      Labs:   Results from last 7 days   Lab Units 02/24/20  0846 02/20/20  0523   WBC Thousand/uL 12 40* 10 28*   HEMOGLOBIN g/dL 10 6* 11 3*   HEMATOCRIT % 34 1* 36 6   PLATELETS Thousands/uL 206 195     Results from last 7 days   Lab Units 02/24/20  0651 02/20/20  0524   SODIUM mmol/L 136 140   POTASSIUM mmol/L 4 3 3 7   CHLORIDE mmol/L 101 105   CO2 mmol/L 29 28   BUN mg/dL 20 15   CREATININE mg/dL 1 03 1 03   CALCIUM mg/dL 9 1 9 2                   Review of Scheduled Meds:    Current Facility-Administered Medications:  acetaminophen 650 mg Oral Q6H PRN Barbie Church MD   amLODIPine 10 mg Oral Daily Barbie Church MD   atorvastatin 40 mg Oral QPM Barbie Church MD   bisacodyl 10 mg Rectal Daily PRN Barbie Church MD   bumetanide 2 mg Oral Daily Barbie Church MD   heparin (porcine) 5,000 Units Subcutaneous Q8H [facility] Barbie Church MD   levothyroxine 112 mcg Oral Early Morning Radha ROSENTHAL Regla Steinberg MD   losartan 25 mg Oral Daily Marquise Jean MD   melatonin 3 mg Oral HS Marquise Jean MD   ondansetron 4 mg Oral Q6H PRN Marquise Jean MD   oxyCODONE 2 5 mg Oral Q6H PRN Marquise Jean MD   pantoprazole 40 mg Oral Early Morning Marquise Jean MD   polyethylene glycol 17 g Oral Daily PRN Marquise Jean MD   senna-docusate sodium 1 tablet Oral BID Marquise Jean MD       Subjective/ HPI: Patients overnight issues or events were reviewed with nursing or staff during rounds or morning huddle session  No new or overnight issues  Offers no complaints  ROS:   A 10 point ROS was performed; negative except as noted above  Imaging:     CT head wo contrast   Final Result by Harmony Connors MD (02/25 3058)      Status post right-sided decompressive hemicraniectomy for a previously identified right parietotemporal lobe intraparenchymal hematoma  Interval evolution and resolution of the previously seen intraparenchymal hemorrhage with residual edema remaining  No evidence for new acute intracranial hemorrhage  Interval removal of the previously seen drainage catheters  Thin subgaleal and small extra-axial collections are noted                    Workstation performed: [id number]             *Labs /Radiology studiesReviewed  *Medications Reviewed and reconciled as needed  *Please refer to order section for additional ordered labs studies  *Case discussed with primary attending during morning huddle case rounds    Physical Examination:  Vitals:   Vitals:    02/24/20 0900 02/24/20 1300 02/24/20 2017 02/25/20 0552   BP: 136/60 136/63 130/71 121/56   BP Location: Left arm Right arm Left arm Right arm   Pulse: 80 73 91 90   Resp:  18 18 16   Temp:  98 2 °F (36 8 °C) 97 9 °F (36 6 °C) 98 3 °F (36 8 °C)   TempSrc:  Oral Oral Oral   SpO2:  99% 93% 96%   Weight:       Height:           General Appearance: no distress, conversive  HEENT: PERRLA, conjuctiva normal; oropharynx clear; mucous membranes moist; hemicraniectomy incision healing well = staples out today by NS   Neck:  Supple, no JVD; no pain with movement  Lungs: CTA, normal respiratory effort, no retractions, expiratory effort normal  CV: regular rate and rhythm; no rubs/murmurs/gallops, PMI normal   ABD: soft; ND/NT; +BS  EXT: no edema  Skin: normal turgor, normal texture, no rashes  Psych: affect normal, mood normal  Neuro: AAO; RASCON 5/5      The above physical exam was reviewed and updated as determined by my evaluation of the patient on 2/25/2020  Invasive Devices     None                    VTE Pharmacologic Prophylaxis: Heparin  Code Status: Level 3 - DNAR and DNI  Current Length of Stay: 7 day(s)      Total time spent:  30 minutes with more than 50% spent counseling/coordinating care  Counseling includes discussion with patient re: progress  and discussion with patient of his/her current medical state/information  Coordination of patient's care was performed in conjunction with primary service  Time invested included review of patient's labs, vitals, and management of their comorbidities with continued monitoring  In addition, this patient was discussed with medical team including physician and advanced extenders  The care of the patient was extensively discussed and appropriate treatment plan was formulated unique for this patient  ** Please Note:  voice to text software may have been used in the creation of this document   Although proof errors in transcription or interpretation are a potential of such software**

## 2020-02-25 NOTE — PROGRESS NOTES
02/25/20 1000   Pain Assessment   Pain Assessment No/denies pain   Pain Score No Pain   Restrictions/Precautions   Precautions Bed/chair alarms;Cognitive; Fall Risk;Supervision on toilet/commode;Seizure  (HOB >30)   Braces or Orthoses Craniectomy Helmet   Comprehension   Comprehension (FIM) 4 - Understands basic info/conversation 75-90% of time   Expression   Expression (FIM) 5 - Needs help/cues only RARELY (< 10% of the time)   Social Interaction   Social Interaction (FIM) 5 - Requires redirection but less than 10% of the time  Problem Solving   Problem solving (FIM) 3 - Solves basic problmes 50-74% of time   Memory   Memory (FIM) 3 - Recognizes, recalls/performs 50-74%   Speech/Language/Cognition Assessmetn   Treatment Assessment Pt participated in skilled ST session focusing on stroke education as pt remains with decreased overall insight and understanding of current deficits and stroke  See below for stroke education details  Pt remained engaged in education, during session  When SLP educating on various aspects of her stroke care and deficits, pt continued to be tangential and verbose in her thoughts  Pt frequently w/ decreased understanding of situation as her comments which she elicited were observed to be more concrete thoughts and inferred that she did not fully comprehend the information or intent of education  Pt frequently attempting to rationalize her deficits and also declining that she had deficits  Of note, pt was able to recall signs and symptoms of a stroke and to call 911 immediately for assistance should these occur  However, when SLP reviewing current deficits in which OT reviewed w/ her immediately before this session, pt currently denying that se has any deficits  SLP then re-educated on short term memory deficits, general thinking and attention/focus   Recommending continued skilled ST services at this time to maximize functional cognitive linguistic skills for improved safety and level of independence  SLP Therapy Minutes   SLP Time In 1000   SLP Time Out 1030   SLP Total Time (minutes) 30   SLP Mode of treatment - Individual (minutes) 30   SLP Mode of treatment - Concurrent (minutes) 0   SLP Mode of treatment - Group (minutes) 0   SLP Mode of treatment - Co-treat (minutes) 0   SLP Mode of Treatment - Total time(minutes) 30 minutes   SLP Cumulative Minutes 180   Therapy Time missed   Time missed? No       Stroke Education:  Pt presenting to [facility] dx with Hemorrhagic stroke and has the identified risk factors increasing pt's risk for stroke High Blood Pressure, High Cholesterol, Obesity and Sleep Apnea  Comprehensive individualized education provided, guided by the "What you need to need to know about stroke: A patient resource" binder was initiated this session  The following topics were reviewed: stroke diagnosis, hemorrhagic stroke, stroke signs and symptoms, changes caused by stroke (common general changes, emotional effects)  Plan to continue stroke education as pt able, appropriate

## 2020-02-25 NOTE — PROGRESS NOTES
02/25/20 0830   Pain Assessment   Pain Assessment No/denies pain   Pain Score No Pain   Restrictions/Precautions   Precautions Bed/chair alarms;Cognitive; Fall Risk;Supervision on toilet/commode;Seizure  (HOB>30*)   Weight Bearing Restrictions No   ROM Restrictions No   Braces or Orthoses Craniectomy Helmet   Lifestyle   Autonomy "Everyone keeps saying I have a problem with my memory, but I can remember "   Oral Hygiene   Type of Assistance Needed Supervision   Amount of Physical Assistance Provided No physical assistance   Comment CS in stance at sink   Oral Hygiene CARE Score 4   Grooming   Able To Initiate Tasks;Comb/Brush Hair;Wash/Dry Face;Brush/Clean Teeth;Wash/Dry Hands   Limitation Noted In Timeliness;Strength   Findings CS in stance at sink   Shower/Bathe Self   Type of Assistance Needed Physical assistance   Amount of Physical Assistance Provided Less than 25%   Comment CGA while in stance to bathe UB, upper legs, jimmie and buttocks  A req to bathe lower legs  Shower/Bathe Self CARE Score 3   Tub/Shower Transfer   Reason Not Assessed Sponge Bath   Findings Performed dry tub transfer to [facility] context, pt reports 1 suction cup grab bar and shower chair  Min A to steady w/ use of horizontal grab bar  Pt reports, "My family doesn't want to ruin the tub, but I want grab bars " Trialled w/ use of tub clamp, cont to req Min A for step over method w/ Mod vc's to inc safety w/ technique  Upper Body Dressing   Type of Assistance Needed Supervision   Amount of Physical Assistance Provided No physical assistance   Comment seated, w/ vc's to protect incision   Upper Body Dressing CARE Score 4   Lower Body Dressing   Type of Assistance Needed Supervision   Amount of Physical Assistance Provided No physical assistance   Comment CS while in stance for clothing management     Lower Body Dressing CARE Score 4   Putting On/Taking Off Footwear   Type of Assistance Needed Supervision   Amount of Physical Assistance Provided No physical assistance   Comment seated EOB to simulate home context, slip on shoes  Putting On/Taking Off Footwear CARE Score 4   Sit to Stand   Type of Assistance Needed Supervision   Amount of Physical Assistance Provided No physical assistance   Comment CS   Sit to Stand CARE Score 4   Bed-Chair Transfer   Type of Assistance Needed Adaptive equipment;Supervision   Amount of Physical Assistance Provided No physical assistance   Comment CS w/ RW   Chair/Bed-to-Chair Transfer CARE Score 4   Toileting Hygiene   Type of Assistance Needed Supervision   Amount of Physical Assistance Provided No physical assistance   Comment CS in stance   [address] Score 4   Toilet Transfer   Type of Assistance Needed Adaptive equipment; Incidental touching   Amount of Physical Assistance Provided No physical assistance   Comment CGA without use of grab bars  Please cont practicing without use of grab bars to simulate home context  Toilet Transfer CARE Score 4   Additional Activities   Additional Activities Comments Focused on item retrieval and transportation w/ use of walker bag  OTR set up cluttered environment w/ focus on RW management and overall safety awareness  Pt demo G carryover of item retrieval and understanding of walker bag, however did req cues to problem solve turn taking in cluttered space w/ pt attempting to abandon RW and move furniture insteady of making small turn and ambulating back through entry way  Assessment   Treatment Assessment Pt seen for skilled OT session focusing on self-care management, dry tub transfer, RW management training, and repetitive cognitive retraining  Pt reports having tub/shower without grab bars, however can use son/DILs walk in shower if need be, plan to discuss further during family training on Friday  Reviewed stroke injury w/ current deficits and signs/symptoms of stroke to maximize carryover   Initially req Mod prompts to teach back start>lukas, reviewed 3x during session and improvement noted at end of session requiring occ cues to recall info provided on handouts  Will cont to benefit from repetitive cognitive retraining on current deficits/stroke edu to maximize insight, RW management training, repetitive safety training, and dynamic standing tolerance  Prognosis Fair   Problem List Decreased endurance; Impaired balance;Decreased mobility; Decreased cognition; Impaired judgement;Decreased safety awareness   Plan   Treatment/Interventions ADL retraining;Functional transfer training; Therapeutic exercise; Endurance training;Patient/family training;Equipment eval/education   Progress Progressing toward goals   Recommendation   OT Discharge Recommendation 24 hour supervision/assist  (OP OT)   OT Therapy Minutes   OT Time In 0830   OT Time Out 1000   OT Total Time (minutes) 90   OT Mode of treatment - Individual (minutes) 90   OT Mode of treatment - Concurrent (minutes) 0   OT Mode of treatment - Group (minutes) 0   OT Mode of treatment - Co-treat (minutes) 0   OT Mode of Treatment - Total time(minutes) 90 minutes   OT Cumulative Minutes 540   Therapy Time missed   Time missed?  No

## 2020-02-25 NOTE — ASSESSMENT & PLAN NOTE
POD 14 right decompressive hemicraniectomy for IPH evacuation    Imagin2020 - CT head wo: status post right-sided decompressive hemicraniectomy for a previously identified right parietotemporal lobe intraparenchymal hematoma  Interval evolution and resolution of previously seen intraparenchymal hemorrhage with residual edema remaining  No evidence for new acute intracranial hemorrhage  Interval removal of the previously seen drainage catheters  Thin subgaleal and small extra-axial collections are noted  Plan:  STAT CT head without contrast if decline in GCS >2pts/1h  Removed all staples and 2 sutures  Continue with craniectomy precautions, helmet when out of bed  Completed 1 week of Keppra for seizure ppx  DVT ppx: SCDs, HSQ  Continue therapies while in [facility]  Neurosurgery will see PRN remainder of the hospitalization  She will follow up as scheduled with repeat CT head

## 2020-02-25 NOTE — PROGRESS NOTES
PM&R Progress Note:    CC: f/u IP    Subjective: no acute events overnight  Reports itchiness over scalp incision, wearing helmet today  No new weakness/changes in sensation  Review of Systems:   Review of Systems   Constitutional: Negative  HENT: Negative  Eyes: Negative  Respiratory: Negative  Cardiovascular: Negative  Gastrointestinal: Negative  Endocrine: Negative  Genitourinary: Negative  Musculoskeletal: Negative  Skin: Negative  Allergic/Immunologic: Negative  Neurological: Negative  Hematological: Negative  Psychiatric/Behavioral: Negative  Objective:   /52 (BP Location: Right arm)   Pulse 82   Temp 98 °F (36 7 °C) (Oral)   Resp 16   Ht 5' 1" (1 549 m)   Wt 92 kg (202 lb 13 2 oz)   SpO2 95%   BMI 38 32 kg/m²     Physical Exam   Constitutional: She is oriented to person, place, and time  She appears well-developed and well-nourished  HENT:   Helmet in place   Eyes: Pupils are equal, round, and reactive to light  EOM are normal    Cardiovascular: Normal rate and regular rhythm  Pulmonary/Chest: Breath sounds normal  She has no wheezes  She has no rales  Abdominal: Soft  Bowel sounds are normal  She exhibits no distension  There is no tenderness  Musculoskeletal: Normal range of motion  Neurological: She is alert and oriented to person, place, and time  Motor: 4/5 throughout    Skin:   Incision intact with staples removed   Psychiatric: She has a normal mood and affect  Nursing note and vitals reviewed  Assessment/Plan: Sebastian Schwarz is a [de-identified] y o  female with HTN, CHF, hypothyroidism, CKD 3, HLD who presented to the [address] on 2/10/20 with mental status changes, vomiting, and headache  She was found to have a 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  She required a craniectomy and evacuation performed by Dr Khurram Rose on 2/11/20    Post-operatively patient completed 7 days of Keppra for seizure ppx  Maintained in a cranial helmet while OOB  Cleared for SQ Heparin for DVT ppx  Further work-up revealed no mass, ischemia or critical stenosis  It was recommended to repeat CTH in 1 month  Patient found to have functional deficits from her baseline  Ad[facility] to [facility] on [facility]/18/20         Plan:   · Rehabilitation plan:  Functional deficits:  Cognitive deficits, generalized weakness  [address] [facility] = 14/30  Patient will likely require 24/7 care at home upon discharge  Continue current PT/OT/SLP plan  · 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 by Dr Tomy Miles on 2/11/20  Cranial precautions  +Helmet while OOB  F/U CTH in 1 month 3/11/20  NSGY removed staples this AM  · HTN: managed on Norvasc, Cozaar  · CHF: combined - managed on Bumex    ECHO showed an EF of 65%  · GERD: managed on Protonix (therapeutic sub for omeprazole)  · Hypothyroidism: managed on Synthroid  · HLD: managed on Lipitor  · CKD 3: baseline Cr = 1 1 - 1 3  · DVT ppx: managed on SQ Heparin 5000 units Q8h and SCDs  · Appreciate internal medicine consultants medical comanagement  · Disposition: Reteam        Lab Results   Component Value Date    WBC 12 40 (H) 02/24/2020    HGB 10 6 (L) 02/24/2020    HCT 34 1 (L) 02/24/2020    MCV 87 02/24/2020     02/24/2020     Lab Results   Component Value Date    SODIUM 136 02/24/2020    K 4 3 02/24/2020     02/24/2020    CO2 29 02/24/2020    BUN 20 02/24/2020    CREATININE 1 03 02/24/2020    GLUC 110 02/24/2020    CALCIUM 9 1 02/24/2020     Lab Results   Component Value Date    INR 1 13 02/10/2020    INR 1 01 03/09/2016    PROTIME 13 9 02/10/2020    PROTIME 10 7 03/09/2016           Current Facility-Administered Medications:     acetaminophen (TYLENOL) tablet 650 mg, 650 mg, Oral, Q6H PRN, Alexander Marina MD, 650 mg at 02/24/20 0713    amLODIPine (NORVASC) tablet 10 mg, 10 mg, Oral, Daily, Alexander Marina MD, 10 mg at 02/25/20 0803    atorvastatin (LIPITOR) tablet 40 mg, 40 mg, Oral, QPM, Deidra Fisher MD, 40 mg at 02/24/20 1814    bisacodyl (DULCOLAX) rectal suppository 10 mg, 10 mg, Rectal, Daily PRN, Deidra Fisher MD    bumetanide (BUMEX) tablet 2 mg, 2 mg, Oral, Daily, Deidra Fisher MD, 2 mg at 02/25/20 0803    heparin (porcine) subcutaneous injection 5,000 Units, 5,000 Units, Subcutaneous, Q8H [address], Deidra Fisher MD, 5,000 Units at 02/25/20 1342    levothyroxine tablet 112 mcg, 112 mcg, Oral, Early Morning, Deidra Fisher MD, 112 mcg at 02/25/20 0532    losartan (COZAAR) tablet 25 mg, 25 mg, Oral, Daily, Deidra Fisher MD, 25 mg at 02/25/20 0803    melatonin tablet 3 mg, 3 mg, Oral, HS, Deidra Fisher MD, 3 mg at 02/24/20 2116    ondansetron (ZOFRAN-ODT) dispersible tablet 4 mg, 4 mg, Oral, Q6H PRN, Deidra Fisher MD    oxyCODONE (ROXICODONE) IR tablet 2 5 mg, 2 5 mg, Oral, Q6H PRN, Deidra Fisher MD, 2 5 mg at 02/22/20 2258    pantoprazole (PROTONIX) EC tablet 40 mg, 40 mg, Oral, Early Morning, Deidra Fisher MD, 40 mg at 02/25/20 0532    polyethylene glycol (MIRALAX) packet 17 g, 17 g, Oral, Daily PRN, Deidra Fisher MD    senna-docusate sodium (SENOKOT S) 8 6-50 mg per tablet 1 tablet, 1 tablet, Oral, BID, Deidra Fisher MD, 1 tablet at 02/25/20 9486    Past Medical History:   Diagnosis Date    Anxiety     Arthritis     joints    Cataract     Disease of thyroid gland     Eczema     GERD (gastroesophageal reflux disease)     Gout     Heart failure ([facility] )     Hepatitis     Hiatal hernia     Hypertension     Onychomycosis     last assessed: 4/29/16    Orthopnea     resolved: 1/8/16    Sleep apnea     wears CPAP       Patient Active Problem List    Diagnosis Date Noted    Intraparenchymal hemorrhage of brain ([facility] ) 02/10/2020    Peripheral arteriosclerosis ([facility] ) 06/21/2019    Osteoporosis screening 03/08/2019    Eczema 12/03/2018    SOB (shortness of breath) 10/26/2018    Radiculopathy of lumbar region 10/02/2018    Corns 10/02/2018  Gastroesophageal reflux disease without esophagitis 06/04/2018    Iron deficiency anemia secondary to inadequate dietary iron intake 06/04/2018    Pain in both feet 03/15/2018    Alkaline phosphatase elevation 03/12/2018    Lumbar disc herniation with radiculopathy 12/08/2017    Hyperlipemia 11/15/2017    Chronic low back pain 10/24/2017    Hyperuricemia 06/23/2017    Pseudogout of left wrist 06/23/2017    Nephrolithiasis 06/21/2017    Acute on chronic diastolic congestive heart failure ([facility] [address] ) 06/02/2017    Chronic venous insufficiency 06/02/2017    Elevated triglycerides with high cholesterol 06/02/2017    CKD (chronic kidney disease), stage III ([facility]) 05/19/2017    Benign essential hypertension 08/22/2016    Chronic diastolic [facility]congestive) heart failure ([facility] ) 08/22/2016    Hypothyroidism 08/22/2016    Morbid obesity due to excess calories ([facility] ) 08/20/2016    Diverticulitis of colon 08/18/2016    WENDI (obstructive sleep apnea) 05/20/2016    Onychomycosis 04/29/2016    Morbid obesity with body mass index of 40 0-44 9 in adult [facility]) 03/10/2016    Knee pain 05/22/2015    Tarsal tunnel syndrome 06/24/2014    Difficulty in walking 03/24/2014    Plantar fascial fibromatosis 62/44/5524    Umbilical hernia 29/08/1698    Atherosclerosis of arteries of extremities ([facility] ) 08/28/2013    Hallux abductovalgus with bunions, unspecified laterality 07/26/2013    Chronic gout without tophus 07/02/2013    Arthropathy of knee 09/04/2012    Rupture of popliteal cyst 09/04/2012    Anxiety 09/04/2012    Hiatal hernia 08/27/2012        Hortensia Nicole MD  PM&R Attending

## 2020-02-26 PROCEDURE — 97130 THER IVNTJ EA ADDL 15 MIN: CPT

## 2020-02-26 PROCEDURE — 99232 SBSQ HOSP IP/OBS MODERATE 35: CPT

## 2020-02-26 PROCEDURE — 97535 SELF CARE MNGMENT TRAINING: CPT

## 2020-02-26 PROCEDURE — 97129 THER IVNTJ 1ST 15 MIN: CPT

## 2020-02-26 PROCEDURE — 97530 THERAPEUTIC ACTIVITIES: CPT

## 2020-02-26 PROCEDURE — 97112 NEUROMUSCULAR REEDUCATION: CPT

## 2020-02-26 RX ADMIN — HEPARIN SODIUM 5000 UNITS: 5000 INJECTION INTRAVENOUS; SUBCUTANEOUS at 13:18

## 2020-02-26 RX ADMIN — ATORVASTATIN CALCIUM 40 MG: 40 TABLET, FILM COATED ORAL at 18:23

## 2020-02-26 RX ADMIN — ACETAMINOPHEN 650 MG: 325 TABLET ORAL at 20:00

## 2020-02-26 RX ADMIN — LEVOTHYROXINE SODIUM 112 MCG: 112 TABLET ORAL at 05:34

## 2020-02-26 RX ADMIN — SENNOSIDES AND DOCUSATE SODIUM 1 TABLET: 8.6; 5 TABLET ORAL at 09:38

## 2020-02-26 RX ADMIN — HEPARIN SODIUM 5000 UNITS: 5000 INJECTION INTRAVENOUS; SUBCUTANEOUS at 05:34

## 2020-02-26 RX ADMIN — AMLODIPINE BESYLATE 10 MG: 10 TABLET ORAL at 09:37

## 2020-02-26 RX ADMIN — PANTOPRAZOLE SODIUM 40 MG: 40 TABLET, DELAYED RELEASE ORAL at 05:34

## 2020-02-26 RX ADMIN — LOSARTAN POTASSIUM 25 MG: 25 TABLET, FILM COATED ORAL at 09:38

## 2020-02-26 RX ADMIN — SENNOSIDES AND DOCUSATE SODIUM 1 TABLET: 8.6; 5 TABLET ORAL at 18:23

## 2020-02-26 RX ADMIN — HEPARIN SODIUM 5000 UNITS: 5000 INJECTION INTRAVENOUS; SUBCUTANEOUS at 21:59

## 2020-02-26 RX ADMIN — BUMETANIDE 2 MG: 2 TABLET ORAL at 09:38

## 2020-02-26 RX ADMIN — MELATONIN 3 MG: at 21:59

## 2020-02-26 NOTE — PROGRESS NOTES
Internal Medicine Progress Note  Patient: Irene Ochoa  Age/sex: [de-identified] y o  female  Medical Record #: [id number]      ASSESSMENT/PLAN: (Interval History)  Irene Ochoa is seen and examined and management for following issues:    Rt parietal/temporal ICH; s/p Rt decompressive hemicraniectomy 2/11/20  · Keppra completed  · Helmet when out of bed  · SBP goal < 160      HTN  · Remains stable on Amlodipine/Losartan  Was on a higher dose of losartan as an outpt (25mg 2x daily)  · Continue to hold outpt propranolol 40mg every 12 hours 2/2 bradycardia  · No changes at goal of <965 systolic     CKD stage III; baseline 1 1-1 3  · stable  · Follows with Dr Veronica Girard as an outpt      Chronic diastolic heart failure; LVEF 65%, mod-severe MR/mild-mod TR  · Continue Bumex 2mg daily  Was on a higher dose as an outpt (2mg daily except M/W/F take 2mg 2x daily)  · Stable     Leukocytosis  · Likely reactive  · No signs of infx    The above assessment and plan was reviewed and updated as determined by my evaluation of the patient on 2/26/2020      Labs:   Results from last 7 days   Lab Units 02/24/20  0846 02/20/20  0523   WBC Thousand/uL 12 40* 10 28*   HEMOGLOBIN g/dL 10 6* 11 3*   HEMATOCRIT % 34 1* 36 6   PLATELETS Thousands/uL 206 195     Results from last 7 days   Lab Units 02/24/20  0651 02/20/20  0524   SODIUM mmol/L 136 140   POTASSIUM mmol/L 4 3 3 7   CHLORIDE mmol/L 101 105   CO2 mmol/L 29 28   BUN mg/dL 20 15   CREATININE mg/dL 1 03 1 03   CALCIUM mg/dL 9 1 9 2                   Review of Scheduled Meds:    Current Facility-Administered Medications:  acetaminophen 650 mg Oral Q6H PRN Leonor Pinto MD   amLODIPine 10 mg Oral Daily Leonor Pinto MD   atorvastatin 40 mg Oral QPM Leonor Pinto MD   bisacodyl 10 mg Rectal Daily PRN Leonor Pinto MD   bumetanide 2 mg Oral Daily Leonor Pinto MD   heparin (porcine) 5,000 Units Subcutaneous Q8H [facility] Leonor Pinto MD   levothyroxine 112 mcg Oral Early Morning Leonor Pinto MD   losartan 25 mg Oral Daily Efe Doran, MD   melatonin 3 mg Oral HS Eefparish Doran, MD   ondansetron 4 mg Oral Q6H PRN Efe Doran, MD   oxyCODONE 2 5 mg Oral Q6H PRN Efeparish Doran, MD   pantoprazole 40 mg Oral Early Morning Efe Doran, MD   polyethylene glycol 17 g Oral Daily PRN Efe Doran, MD   senna-docusate sodium 1 tablet Oral BID Efe Doran, MD       Subjective/ HPI: Patients overnight issues or events were reviewed with nursing or staff during rounds or morning huddle session  No new or overnight issues  Offers no complaints  ROS:   A 10 point ROS was performed; negative except as noted above  *Labs /Radiology studiesReviewed  *Medications Reviewed and reconciled as needed  *Please refer to order section for additional ordered labs studies  *Case discussed with primary attending during morning huddle case rounds    Physical Examination:  Vitals:   Vitals:    02/25/20 2028 02/26/20 0526 02/26/20 0841 02/26/20 0937   BP: 137/51 128/60 124/61 124/61   BP Location: Right arm Right arm Right arm    Pulse: 75 70 79    Resp: 18 18 16    Temp: 97 7 °F (36 5 °C) 98 2 °F (36 8 °C) 98 °F (36 7 °C)    TempSrc: Oral Oral Oral    SpO2: 97% 96% 97%    Weight:  92 1 kg (203 lb 0 7 oz)     Height:           General Appearance: no distress, conversive  HEENT: PERRLA, conjuctiva normal; oropharynx clear; mucous membranes moist   Neck:  Supple, no JVD; no pain with movement  Lungs: CTA, normal respiratory effort, no retractions, expiratory effort normal  CV: regular rate and rhythm; no rubs/murmurs/gallops, PMI normal   ABD: soft; ND/NT; +BS  EXT: no edema  Skin: normal turgor, normal texture, no rashes  Psych: affect normal, mood normal  Neuro: AAO; RASCON 5/5      The above physical exam was reviewed and updated as determined by my evaluation of the patient on 2/26/2020      Invasive Devices     None                    VTE Pharmacologic Prophylaxis: Heparin  Code Status: Level 3 - DNAR and DNI  Current Length of Stay: 8 day(s)      Total time spent:  30 minutes with more than 50% spent counseling/coordinating care  Counseling includes discussion with patient re: progress  and discussion with patient of his/her current medical state/information  Coordination of patient's care was performed in conjunction with primary service  Time invested included review of patient's labs, vitals, and management of their comorbidities with continued monitoring  In addition, this patient was discussed with medical team including physician and advanced extenders  The care of the patient was extensively discussed and appropriate treatment plan was formulated unique for this patient  ** Please Note:  voice to text software may have been used in the creation of this document   Although proof errors in transcription or interpretation are a potential of such software**

## 2020-02-26 NOTE — PROGRESS NOTES
02/26/20 0900   Pain Assessment   Pain Assessment No/denies pain   Pain Score No Pain   Restrictions/Precautions   Precautions Bed/chair alarms;Cognitive; Fall Risk;Seizure;Supervision on toilet/commode   Braces or Orthoses Craniectomy Helmet   Comprehension   Comprehension (FIM) 4 - Understands basic info/conversation 75-90% of time   Expression   Expression (FIM) 5 - Needs help/cues only RARELY (< 10% of the time)   Social Interaction   Social Interaction (FIM) 5 - Requires redirection but less than 10% of the time  Problem Solving   Problem solving (FIM) 4 - Solves basic problems 75-89% of time   Memory   Memory (FIM) 3 - Recognizes, recalls/performs 50-74%   Speech/Language/Cognition Assessmetn   Treatment Assessment See below for session note  SLP Therapy Minutes   SLP Time In 0900   SLP Time Out 1000   SLP Total Time (minutes) 60   SLP Mode of treatment - Individual (minutes) 60   SLP Mode of treatment - Concurrent (minutes) 0   SLP Mode of treatment - Group (minutes) 0   SLP Mode of treatment - Co-treat (minutes) 0   SLP Mode of Treatment - Total time(minutes) 60 minutes   SLP Cumulative Minutes 240   Therapy Time missed   Time missed? No     Session Note: Pt participated in skilled ST session focusing on cognitive linguistic skills and continued stroke education  During continued stroke education, pt remained engaged, however, continues to present w/ overall impaired insight into situation in regards to medical situation and it's impact on her level of independence, as well as cognitive linguistic deficits  This conversation was initiated as risk factors were being reviewed, however, pt perseverative on stating that she has no deficits from the stroke, but only that her back pain is what limits her   Pt remained perseverative on denying any deficits from the stroke across stroke education, noting pt to continue to speak on this topic, despite education being provided on risk factors, learning s/s, etc  Pt verbalized, "My brain is fine, I control what I do, not my brain " Despite SLP attempts at improving insight into situation and safety as pt was provided w/ examples of cognitive linguistic deficits, pt continued to rationalize these deficits and remained tangential and verbose  SLP provided direct verbal cues for attention and for re-direction as conversation would eventually end up about unrelated topics  SLP again re-educated pt on reason for stroke education to provide knowledge about stroke and how to prevent another stroke/minimze risk, rather than to upset her  Pt was receptive to this information  When presented w/ safety awareness picture cards, pt accurately ID'd unsafe situations in 7/8 trials, requiring verbal cues to improve awareness for final trial  Pt requiring verbal encouragement to continue session as she frequently requested to lay down as she was tired  SLP educating that staff would like her to remain awake during the day in order to improve sleep/wake cycle  Lastly, pt engaged in attention/working memory tasks utilizing cards  When instructed to tap the table each time she saw a specific colored card, pt initially unable to complete w/o engaging in tangential convo and w/ decreased recall of instructions  However, after additional models, then able to maintain attention/recall of task to complete accurately  Utilizing n-back procedure of recalling card which was presented one card ago, pt demonstrating significant difficulty w/ task as she was ~50% accurate in recalling either color or number of card but unable to recall both details  At times also unable to recall either detail accurately  Despite SLP education on purpose of task to target attention and working memory, pt becoming frustrated w/ difficulty of task   At end of session SLP re-educating on goal of therapies is to provide her w/ support in her recovery process to which pt stated that she understands but that "there is nothing wrong" and "I'll be fine once I'm home " Recommending continued skilled ST services at this time to further maximize functional cognitive linguistic skills in order to improve safety and level of independence  Stroke Education Continued:   Pt presenting to [facility] dx with Hemorrhagic stroke and has the identified risk factors increasing pt's risk for stroke High Blood Pressure, High Cholesterol, Obesity and Sleep Apnea  Comprehensive individualized education was continued, guided by the "What you need to need to know about stroke: A patient resource "  The following topics were reviewed: risk factors-those which are out of our control and those which can be changed, treated or managed

## 2020-02-26 NOTE — PROGRESS NOTES
02/26/20 1100   Pain Assessment   Pain Assessment No/denies pain   Pain Score No Pain   Restrictions/Precautions   Precautions Bed/chair alarms;Cognitive; Fall Risk;Seizure;Supervision on toilet/commode   Weight Bearing Restrictions No   ROM Restrictions No   Braces or Orthoses Craniectomy Helmet   Cognition   Overall Cognitive Status Impaired   Arousal/Participation Alert; Cooperative   Attention Attends with cues to redirect   Orientation Level Oriented to person;Oriented to place;Oriented to situation   Memory Decreased short term memory;Decreased recall of recent events;Decreased recall of precautions   Following Commands Follows one step commands with increased time or repetition   Subjective   Subjective Patient agreeable to particiapte  Fatigues easily    Sit to Stand   Type of Assistance Needed Supervision   Amount of Physical Assistance Provided No physical assistance   Sit to Stand CARE Score 4   Bed-Chair Transfer   Type of Assistance Needed Supervision   Amount of Physical Assistance Provided No physical assistance   Chair/Bed-to-Chair Transfer CARE Score 4   Transfer Bed/Chair/Wheelchair   Limitations Noted In Endurance;LE Strength   Adaptive Equipment Roller Walker   Car Transfer   Type of Assistance Needed Supervision   Amount of Physical Assistance Provided No physical assistance   Car Transfer CARE Score 4   Walk 10 Feet   Type of Assistance Needed Supervision; Adaptive equipment   Amount of Physical Assistance Provided No physical assistance   Walk 10 Feet CARE Score 4   Walk 50 Feet with Two Turns   Type of Assistance Needed Supervision; Adaptive equipment   Amount of Physical Assistance Provided No physical assistance   Walk 50 Feet with Two Turns CARE Score 4   Walk 150 Feet   Type of Assistance Needed Supervision; Adaptive equipment   Amount of Physical Assistance Provided No physical assistance   Walk 150 Feet CARE Score 4   Ambulation   Does the patient walk? 2   Yes   Primary Mode of Locomotion Prior to Admission Walk   Distance Walked (feet) 150 ft  (x2)   Assist Device Rollator   Limitations Noted In Endurance   Wheel 50 Feet with Two Turns   Reason if not Attempted Activity not applicable   Wheel 50 Feet with Two Turns CARE Score 9   Wheel 150 Feet   Reason if not Attempted Activity not applicable   Wheel 080 Feet CARE Score 9   Wheelchair mobility   Does the patient use a wheelchair? 0  No   Curb or Single Stair   Style negotiated Single stair   Type of Assistance Needed Supervision   Amount of Physical Assistance Provided No physical assistance   Comment using B handrails; reciprocal pattern; forward up/ forward down    1 Step (Curb) CARE Score 4   4 Steps   Reason if not Attempted Activity not applicable   4 Steps CARE Score 9   12 Steps   Reason if not Attempted Activity not applicable   12 Steps CARE Score 9   Stairs   Type Stairs   # of Steps 2   Weight Bearing Precautions Fall Risk   Assist Devices Bilateral Rail   Toilet Transfer   Type of Assistance Needed Supervision   Amount of Physical Assistance Provided No physical assistance   Toilet Transfer CARE Score 4   Therapeutic Interventions   Strengthening seated hip flexion and LAQ, 30x BLE   Flexibility BLE gentle HS and heel cord stretching TERT 2 minutes each    Balance 10'x2 ambulation with light to no HHA; Other reviewed donning/doffing helmet; reviewed importance of wearing and helmet and what it protects   Assessment   Treatment Assessment Patient engaged in 30 minute PT treatment session focusing on improving overall functional mobility  Patient engages appx 30 minutes at a time however does not tolerate extended activity 2* back pain and fatigue  She also has difficulty attending to directions and focusing on a task  This session, patient observed leaving RW aside at times to perform short distance ambulation without RW  Educated on safety precautions and recommendation to utilize RW at all times   Patient does attempt to reach for external support, ie furniture surfs, however does not lose balance  Plan for family training Friday with DC Saturday with recommended 24 hour S  Problem List Decreased endurance; Impaired balance;Decreased mobility;Orthopedic restrictions;Pain   Barriers to Discharge Decreased caregiver support   PT Barriers   Functional Limitation Car transfers; Ramp negotiation;Stair negotiation;Standing   Plan   Treatment/Interventions Functional transfer training;LE strengthening/ROM; Therapeutic exercise;Elevations; Endurance training;Cognitive reorientation;Patient/family training;Equipment eval/education; Bed mobility;Gait training; Compensatory technique education   Progress Progressing toward goals   Recommendation   Recommendation Home with family support;24 hour supervision/assist   Equipment Recommended Walker   PT Therapy Minutes   PT Time In 1100   PT Time Out 1130   PT Total Time (minutes) 30   PT Mode of treatment - Individual (minutes) 30   PT Mode of treatment - Concurrent (minutes) 0   PT Mode of treatment - Group (minutes) 0   PT Mode of treatment - Co-treat (minutes) 0   PT Mode of Treatment - Total time(minutes) 30 minutes   PT Cumulative Minutes 510   Therapy Time missed   Time missed?  No

## 2020-02-26 NOTE — PROGRESS NOTES
02/26/20 1330   Pain Assessment   Pain Assessment No/denies pain   Pain Score No Pain   Restrictions/Precautions   Precautions Bed/chair alarms;Cognitive; Fall Risk;Seizure;Supervision on toilet/commode   Weight Bearing Restrictions No   ROM Restrictions No   Braces or Orthoses Craniectomy Helmet   Cognition   Overall Cognitive Status Impaired   Arousal/Participation Alert; Cooperative   Attention Attends with cues to redirect   Orientation Level Oriented to person;Oriented to place;Oriented to situation   Memory Decreased short term memory;Decreased recall of recent events;Decreased recall of precautions   Following Commands Follows one step commands with increased time or repetition   Subjective   Subjective Patient agreeable to particiapteric Cordon present  initally however reported he was unable to stay to review recommendations  Plan for family training Friday    Roll Left and Right   Type of Assistance Needed Supervision   Amount of Physical Assistance Provided No physical assistance   Roll Left and Right CARE Score 4   Sit to Lying   Type of Assistance Needed Supervision   Amount of Physical Assistance Provided No physical assistance   Sit to Lying CARE Score 4   Sit to Stand   Type of Assistance Needed Supervision   Amount of Physical Assistance Provided No physical assistance   Sit to Stand CARE Score 4   Bed-Chair Transfer   Type of Assistance Needed Supervision   Amount of Physical Assistance Provided No physical assistance   Chair/Bed-to-Chair Transfer CARE Score 4   Transfer Bed/Chair/Wheelchair   Limitations Noted In Endurance;LE Strength   Adaptive Equipment Roller Walker   Walk 10 Feet   Type of Assistance Needed Supervision; Adaptive equipment   Amount of Physical Assistance Provided No physical assistance   Walk 10 Feet CARE Score 4   Walk 50 Feet with Two Turns   Type of Assistance Needed Supervision; Adaptive equipment   Amount of Physical Assistance Provided No physical assistance   Walk 50 Feet with Two Turns CARE Score 4   Walk 150 Feet   Type of Assistance Needed Supervision; Adaptive equipment   Amount of Physical Assistance Provided No physical assistance   Walk 150 Feet CARE Score 4   Ambulation   Does the patient walk? 2  Yes   Therapeutic Interventions   Balance standing unsupported ball toss    Assessment   Treatment Assessment Patient fatigued this session however demonstrated overall fair safety with activity  Grandson present initially in which PT quickly reviewed plan for training with DIL, son and grandson prior to discharge 2/28/20  Grandson unable to stay this session  Will develop HEP for patient to perform at home next session to maintain strength and ROM for safety with activity and to reduce risk for falls  Problem List Decreased endurance; Impaired balance;Decreased mobility;Orthopedic restrictions;Pain   Barriers to Discharge Decreased caregiver support   PT Barriers   Functional Limitation Car transfers; Ramp negotiation;Stair negotiation;Standing   Plan   Treatment/Interventions Functional transfer training;LE strengthening/ROM; Elevations; Therapeutic exercise; Endurance training;Cognitive reorientation;Patient/family training;Equipment eval/education; Bed mobility;Gait training; Compensatory technique education   Progress Progressing toward goals   Recommendation   Recommendation Home with family support   Equipment Recommended Walker   PT Therapy Minutes   PT Time In 1330   PT Time Out 1400   PT Total Time (minutes) 30   PT Mode of treatment - Individual (minutes) 30   PT Mode of treatment - Concurrent (minutes) 0   PT Mode of treatment - Group (minutes) 0   PT Mode of treatment - Co-treat (minutes) 0   PT Mode of Treatment - Total time(minutes) 30 minutes   PT Cumulative Minutes 540   Therapy Time missed   Time missed?  No

## 2020-02-27 LAB
ANION GAP SERPL CALCULATED.3IONS-SCNC: 6 MMOL/L (ref 4–13)
BASOPHILS # BLD AUTO: 0.04 THOUSANDS/ΜL (ref 0–0.1)
BASOPHILS NFR BLD AUTO: 1 % (ref 0–1)
BUN SERPL-MCNC: 24 MG/DL (ref 5–25)
CALCIUM SERPL-MCNC: 9.1 MG/DL (ref 8.3–10.1)
CHLORIDE SERPL-SCNC: 103 MMOL/L (ref 100–108)
CO2 SERPL-SCNC: 30 MMOL/L (ref 21–32)
CREAT SERPL-MCNC: 1.09 MG/DL (ref 0.6–1.3)
EOSINOPHIL # BLD AUTO: 0.16 THOUSAND/ΜL (ref 0–0.61)
EOSINOPHIL NFR BLD AUTO: 2 % (ref 0–6)
ERYTHROCYTE [DISTWIDTH] IN BLOOD BY AUTOMATED COUNT: 16.3 % (ref 11.6–15.1)
GFR SERPL CREATININE-BSD FRML MDRD: 48 ML/MIN/1.73SQ M
GLUCOSE SERPL-MCNC: 105 MG/DL (ref 65–140)
HCT VFR BLD AUTO: 34.7 % (ref 34.8–46.1)
HGB BLD-MCNC: 11 G/DL (ref 11.5–15.4)
IMM GRANULOCYTES # BLD AUTO: 0.06 THOUSAND/UL (ref 0–0.2)
IMM GRANULOCYTES NFR BLD AUTO: 1 % (ref 0–2)
LYMPHOCYTES # BLD AUTO: 0.92 THOUSANDS/ΜL (ref 0.6–4.47)
LYMPHOCYTES NFR BLD AUTO: 14 % (ref 14–44)
MCH RBC QN AUTO: 27.8 PG (ref 26.8–34.3)
MCHC RBC AUTO-ENTMCNC: 31.7 G/DL (ref 31.4–37.4)
MCV RBC AUTO: 88 FL (ref 82–98)
MONOCYTES # BLD AUTO: 0.62 THOUSAND/ΜL (ref 0.17–1.22)
MONOCYTES NFR BLD AUTO: 10 % (ref 4–12)
NEUTROPHILS # BLD AUTO: 4.74 THOUSANDS/ΜL (ref 1.85–7.62)
NEUTS SEG NFR BLD AUTO: 72 % (ref 43–75)
NRBC BLD AUTO-RTO: 0 /100 WBCS
PLATELET # BLD AUTO: 178 THOUSANDS/UL (ref 149–390)
PMV BLD AUTO: 10.7 FL (ref 8.9–12.7)
POTASSIUM SERPL-SCNC: 4 MMOL/L (ref 3.5–5.3)
RBC # BLD AUTO: 3.96 MILLION/UL (ref 3.81–5.12)
SODIUM SERPL-SCNC: 139 MMOL/L (ref 136–145)
WBC # BLD AUTO: 6.54 THOUSAND/UL (ref 4.31–10.16)

## 2020-02-27 PROCEDURE — 97530 THERAPEUTIC ACTIVITIES: CPT

## 2020-02-27 PROCEDURE — 80048 BASIC METABOLIC PNL TOTAL CA: CPT | Performed by: NURSE PRACTITIONER

## 2020-02-27 PROCEDURE — 97129 THER IVNTJ 1ST 15 MIN: CPT

## 2020-02-27 PROCEDURE — 97110 THERAPEUTIC EXERCISES: CPT

## 2020-02-27 PROCEDURE — 97130 THER IVNTJ EA ADDL 15 MIN: CPT

## 2020-02-27 PROCEDURE — 97535 SELF CARE MNGMENT TRAINING: CPT

## 2020-02-27 PROCEDURE — 85025 COMPLETE CBC W/AUTO DIFF WBC: CPT | Performed by: NURSE PRACTITIONER

## 2020-02-27 PROCEDURE — 99233 SBSQ HOSP IP/OBS HIGH 50: CPT

## 2020-02-27 RX ADMIN — LOSARTAN POTASSIUM 25 MG: 25 TABLET, FILM COATED ORAL at 10:02

## 2020-02-27 RX ADMIN — ATORVASTATIN CALCIUM 40 MG: 40 TABLET, FILM COATED ORAL at 17:31

## 2020-02-27 RX ADMIN — LEVOTHYROXINE SODIUM 112 MCG: 112 TABLET ORAL at 05:44

## 2020-02-27 RX ADMIN — HEPARIN SODIUM 5000 UNITS: 5000 INJECTION INTRAVENOUS; SUBCUTANEOUS at 05:44

## 2020-02-27 RX ADMIN — MELATONIN 3 MG: at 21:32

## 2020-02-27 RX ADMIN — AMLODIPINE BESYLATE 10 MG: 10 TABLET ORAL at 10:02

## 2020-02-27 RX ADMIN — HEPARIN SODIUM 5000 UNITS: 5000 INJECTION INTRAVENOUS; SUBCUTANEOUS at 14:09

## 2020-02-27 RX ADMIN — PANTOPRAZOLE SODIUM 40 MG: 40 TABLET, DELAYED RELEASE ORAL at 05:44

## 2020-02-27 RX ADMIN — BUMETANIDE 2 MG: 2 TABLET ORAL at 10:02

## 2020-02-27 RX ADMIN — HEPARIN SODIUM 5000 UNITS: 5000 INJECTION INTRAVENOUS; SUBCUTANEOUS at 21:32

## 2020-02-27 NOTE — PROGRESS NOTES
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   She required a craniectomy and evacuation performed by Dr Lala Diaz on 2/11/20   Post-operatively patient completed 7 days of Keppra for seizure ppx  Maintained in a cranial helmet while OOB   Cleared for SQ Heparin for DVT ppx  Further work-up revealed no mass, ischemia or critical stenosis   It was recommended to repeat CTH in 1 month   Patient found to have functional deficits from her baseline   Admitted to [facility] on 2/18/20         Plan:   · Rehabilitation plan:  Functional deficits:  Cognitive deficits, generalized weakness  [address] [place] [address], Ne = 14/30  Patient will likely require 24/7 care at home upon discharge  Continue current PT/OT/SLP plan  · Functional update - bathing Min assist, dressing supervision, transfers supervision, ambulation supervision  · 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 by Dr Lala Diaz on 2/11/20  Cranial precautions   +Helmet while OOB   F/U CTH in 1 month 3/11/20   NSGY removed staples   · HTN: managed on Norvasc, Cozaar  · CHF: combined - managed on Bumex   ECHO showed an EF of 65%  · GERD: managed on Protonix (therapeutic sub for omeprazole)  · Hypothyroidism: managed on Synthroid  · HLD: managed on Lipitor  · CKD 3: baseline Cr = 1 1 - 1 3  · DVT ppx: managed on SQ Heparin 5000 units Q8h and SCDs  · Appreciate [facility] medicine [facility] medical comanagement  · Disposition:  Discharge home likely soon    The above assessment and plan was reviewed and updated as determined by my evaluation of the patient on 02/26/20  Chief Complaints:  stroke rehab follow-up    Subjective:     Patient reports feeling better and better  She reports strength feels largely intact  Patient denies worsening vision, sensation, headaches, fever, chills, sweats, abdominal pain, bowel or bladder problems, calf pain, other complaints  ROS: A 10 point ROS was performed; negative except as noted above      ---------------------------------------------------------------------------------------------------------------------    Objective: Allergies and Medications per EMR    Physical Exam:  Temp:  [97 7 °F (36 5 °C)-98 2 °F (36 8 °C)] 98 1 °F (36 7 °C)  HR:  [70-79] 70  Resp:  [16-18] 18  BP: (124-137)/(51-61) 132/59  GEN:  Sitting in NAD  and With helmet on  HEENT/NECK: Crani defect but appropriate appearance with incision healing adequately, moist mucous membranes  CARDIAC: Regular rate rhythm, no murmers, no rubs, no gallops  LUNGS:  clear to auscultation, no wheezes, rales, or rhonchi  ABDOMEN: Soft, non-tender, non-distended, normal active bowel sounds  EXTREMITIES/SKIN:  no calf edema, no calf tenderness to palpation  NEURO:   MENTAL STATUS: awake, oriented to person, place, time, and situation, CN II-XII: grossly intact  and Strength/MMT:  Near full throughout  [facility]:  Affect:  Euthymic     Physical exam performed, documentation above reviewed, and updated if and when appropriate on date of encounter listed below:   02/26/20    Diagnostic Studies: reviewed, no new imaging  No results found      Laboratory:    Results from last 7 days   Lab Units 02/24/20  0846 02/20/20  0523   HEMOGLOBIN g/dL 10 6* 11 3*   HEMATOCRIT % 34 1* 36 6   WBC Thousand/uL 12 40* 10 28*     Results from last 7 days   Lab Units 02/24/20  0651 02/20/20  0524   BUN mg/dL 20 15 POTASSIUM mmol/L 4 3 3 7   CHLORIDE mmol/L 101 105   CREATININE mg/dL 1 03 1 03            Patient Active Problem List   Diagnosis    Morbid obesity due to excess calories (HCC)    Benign essential hypertension    Chronic diastolic (congestive) heart failure (HCC)    Hypothyroidism    Acute on chronic diastolic congestive heart failure (HCC)    Arthropathy of knee    Hallux abductovalgus with bunions, unspecified laterality    Atherosclerosis of arteries of extremities (HCC)    Chronic low back pain    Chronic venous insufficiency    CKD (chronic kidney disease), stage III (HCC)    Difficulty in walking    Diverticulitis of colon    Elevated triglycerides with high cholesterol    Chronic gout without tophus    Hiatal hernia    Hyperlipemia    Hyperuricemia    Knee pain    Lumbar disc herniation with radiculopathy    Morbid obesity with body mass index of 40 0-44 9 in adult ([facility])    Nephrolithiasis    Onychomycosis    WENDI (obstructive sleep apnea)    Umbilical hernia    Tarsal tunnel syndrome    Rupture of popliteal cyst    Pseudogout of left wrist    Plantar fascial fibromatosis    Anxiety    Alkaline phosphatase elevation    Pain in both feet    Gastroesophageal reflux disease without esophagitis    Iron deficiency anemia secondary to inadequate dietary iron intake    Radiculopathy of lumbar region    Corns    SOB (shortness of breath)    Eczema    Osteoporosis screening    Peripheral arteriosclerosis ([facility] [address] )    Intraparenchymal hemorrhage of brain ([facility])       ** Please Note: Fluency Direct voice to text software may have been used in the creation of this document   **    Blue Chavez MD, [address]  Physical Medicine and Rehabilitation  Brain Injury Medicine

## 2020-02-27 NOTE — PROGRESS NOTES
02/27/20 0830   Pain Assessment   Pain Assessment No/denies pain   Pain Score No Pain   Restrictions/Precautions   Precautions Bed/chair alarms;Cognitive; Fall Risk;Seizure;Supervision on toilet/commode   Weight Bearing Restrictions No   ROM Restrictions No   Braces or Orthoses Craniectomy Helmet   Lifestyle   Autonomy "I am so happy that I got a shower, the water felt so good "   Oral Hygiene   Type of Assistance Needed Supervision   Amount of Physical Assistance Provided No physical assistance   Oral Hygiene CARE Score 4   Grooming   Able To Initiate Tasks;Comb/Brush Hair;Wash/Dry Face;Brush/Clean Teeth;Wash/Dry Hands   Limitation Noted In Timeliness;Problem Solving   Findings CS while in stance   Shower/Bathe Self   Type of Assistance Needed Incidental touching   Amount of Physical Assistance Provided No physical assistance   Comment Pt w/ shower orders 2/26, able to wash incision w/ baby shampoo, clean wash cloth and pat dry w/ towel  Able to bathe UB and upper legs while seated  CGA while in stance w/ unilateral support of grab bar to bathe jimmie and buttocks  Shower/Bathe Self CARE Score 4   Tub/Shower Transfer   Limitations Noted In Balance; Safety   Adaptive Equipment Grab Bars;Seat with Back   Assessed Shower   Findings CGA w/ use of grab bars   Upper Body Dressing   Type of Assistance Needed Supervision   Amount of Physical Assistance Provided No physical assistance   Upper Body Dressing CARE Score 4   Lower Body Dressing   Type of Assistance Needed Supervision   Amount of Physical Assistance Provided No physical assistance   Comment CS while in stance   Lower Body Dressing CARE Score 4   Putting On/Taking Off Footwear   Type of Assistance Needed Supervision   Amount of Physical Assistance Provided No physical assistance   Comment seated EOB to don socks, slip on shoes   Putting On/Taking Off Footwear CARE Score 4   Sit to Stand   Type of Assistance Needed Supervision   Amount of Physical Assistance Provided No physical assistance   Sit to Stand CARE Score 4   Bed-Chair Transfer   Type of Assistance Needed Supervision   Amount of Physical Assistance Provided No physical assistance   Chair/Bed-to-Chair Transfer CARE Score 4   Toileting Hygiene   Type of Assistance Needed Supervision   Amount of Physical Assistance Provided No physical assistance   Toileting Hygiene CARE Score 4   Toilet Transfer   Type of Assistance Needed Supervision   Amount of Physical Assistance Provided No physical assistance   Toilet Transfer CARE Score 4   Additional Activities   Additional Activities Comments Pt weaved between 10 cones at ground level w/ focus on turn taking w/ RW and navigating environmental barriers  Initially req vc's to maximize wide turn taking to inc safety, improvement noted w/ 3 other trials completing at   Seated rest break provided between each trial    Assessment   Treatment Assessment Seen for skilled OT w/ focus on self-care management and RW management training  Details on ADL noted above, overall completed at [facility] for shower xfer and  for all other tasks  Cont to req vc's to maximize problem solving and safety awareness  Pt's family will be in around [address] [place] [address] for family training, plan to review pt's current LOF and cont w/ d/c recommendations to dec caregiver burden as pt will require 24 hour supervision 2* cognitive deficits  Plan to complete ADL in tub/shower tomorrow to simulate home context  Prognosis Fair   Problem List Decreased endurance; Impaired balance;Decreased mobility;Orthopedic restrictions;Pain   Plan   Treatment/Interventions ADL retraining;Functional transfer training; Therapeutic exercise; Endurance training;Cognitive reorientation;Patient/family training   Progress Progressing toward goals   Recommendation   OT Discharge Recommendation 24 hour supervision/assist  (OP OT)   OT Therapy Minutes   OT Time In 0830   OT Time Out 0930   OT Total Time (minutes) 60   OT Mode of treatment - Individual (minutes) 60   OT Mode of treatment - Concurrent (minutes) 0   OT Mode of treatment - Group (minutes) 0   OT Mode of treatment - Co-treat (minutes) 0   OT Mode of Treatment - Total time(minutes) 60 minutes   OT Cumulative Minutes 690   Therapy Time missed   Time missed?  No

## 2020-02-27 NOTE — PROGRESS NOTES
Internal Medicine Progress Note  Patient: Louise Warner  Age/sex: [de-identified] y o  female  Medical Record #: [id number]      ASSESSMENT/PLAN: (Interval History)  Louise Warner is seen and examined and management for following issues:    Rt parietal/temporal ICH; s/p Rt decompressive hemicraniectomy 2/11/20  · Keppra completed  · Helmet when out of bed  · SBP goal < 160      HTN  · Remains stable on Amlodipine/Losartan  Was on a higher dose of losartan as an outpt (25mg 2x daily)  · Continue to hold outpt propranolol 40mg every 12 hours 2/2 bradycardia  · No changes since well within goal of <179 systolic     CKD stage III; baseline 1 1-1 3  · stable  · Follows with Dr Ty Leonard as an outpt      Chronic diastolic heart failure; LVEF 65%, mod-severe MR/mild-mod TR  · Continue Bumex 2mg daily  Was on a higher dose as an outpt (2mg daily except M/W/F take 2mg 2x daily)  · Stable     Leukocytosis  · Likely reactive  · No signs of infx    The above assessment and plan was reviewed and updated as determined by my evaluation of the patient on 2/27/2020      Labs:   Results from last 7 days   Lab Units 02/27/20  0642 02/24/20  0846   WBC Thousand/uL 6 54 12 40*   HEMOGLOBIN g/dL 11 0* 10 6*   HEMATOCRIT % 34 7* 34 1*   PLATELETS Thousands/uL 178 206     Results from last 7 days   Lab Units 02/27/20  0642 02/24/20  0651   SODIUM mmol/L 139 136   POTASSIUM mmol/L 4 0 4 3   CHLORIDE mmol/L 103 101   CO2 mmol/L 30 29   BUN mg/dL 24 20   CREATININE mg/dL 1 09 1 03   CALCIUM mg/dL 9 1 9 1                   Review of Scheduled Meds:    Current Facility-Administered Medications:  acetaminophen 650 mg Oral Q6H PRN Karley Horn MD   amLODIPine 10 mg Oral Daily Karley Horn MD   atorvastatin 40 mg Oral QPM Karley Horn MD   bisacodyl 10 mg Rectal Daily PRN Karley Horn MD   bumetanide 2 mg Oral Daily Karley Horn MD   heparin (porcine) 5,000 Units Subcutaneous Q8H [address] Karley Horn MD   levothyroxine 112 mcg Oral Early Morning Jenna Whaley MD   losartan 25 mg Oral Daily Jenna Whaley MD   melatonin 3 mg Oral HS Jenna Whaley MD   ondansetron 4 mg Oral Q6H PRN Jenna Whaley MD   oxyCODONE 2 5 mg Oral Q6H PRN Jenna Whaley MD   pantoprazole 40 mg Oral Early Morning Jenna Whaley MD   polyethylene glycol 17 g Oral Daily PRN Jenna Whaley MD   senna-docusate sodium 1 tablet Oral BID Jenna Whaley MD       Subjective/ HPI: Patients overnight issues or events were reviewed with nursing or staff during rounds or morning huddle session  No new or overnight issues  Offers no complaints  ROS:   A 10 point ROS was performed; negative except as noted above  *Labs /Radiology studiesReviewed  *Medications Reviewed and reconciled as needed  *Please refer to order section for additional ordered labs studies  *Case discussed with primary attending during morning huddle case rounds    Physical Examination:  Vitals:   Vitals:    02/26/20 0937 02/26/20 1443 02/26/20 2110 02/27/20 0530   BP: 124/61 132/59 112/55 118/62   BP Location:   Right arm Right arm   Pulse:  70 80 78   Resp:  18 18 18   Temp:  98 1 °F (36 7 °C) 98 °F (36 7 °C) 98 3 °F (36 8 °C)   TempSrc:  Oral Oral Oral   SpO2:  96% 97% 96%   Weight:       Height:           General Appearance: no distress, conversive  HEENT: PERRLA, conjuctiva normal; oropharynx clear; mucous membranes moist   Neck:  Supple, no JVD; no pain with movement  Lungs: CTA, normal respiratory effort, no retractions, expiratory effort normal  CV: regular rate and rhythm; no rubs/murmurs/gallops, PMI normal   ABD: soft; ND/NT; +BS  EXT: no edema  Skin: normal turgor, normal texture, no rashes  Psych: affect normal, mood normal  Neuro: AAO; RASCON 5/5      The above physical exam was reviewed and updated as determined by my evaluation of the patient on 2/27/2020      Invasive Devices     None                    VTE Pharmacologic Prophylaxis: Heparin  Code Status: Level 3 - DNAR and DNI  Current Length of Stay: 9 day(s)      Total time spent:  30 minutes with more than 50% spent counseling/coordinating care  Counseling includes discussion with patient re: progress  and discussion with patient of his/her current medical state/information  Coordination of patient's care was performed in conjunction with primary service  Time invested included review of patient's labs, vitals, and management of their comorbidities with continued monitoring  In addition, this patient was discussed with medical team including physician and advanced extenders  The care of the patient was extensively discussed and appropriate treatment plan was formulated unique for this patient  ** Please Note:  voice to text software may have been used in the creation of this document   Although proof errors in transcription or interpretation are a potential of such software**

## 2020-02-27 NOTE — TEAM CONFERENCE
Acute RehabilitationTeam Conference Note  Date: 2/27/2020   Time: 10:52 AM       Patient Name:  Alvarado Acuna       Medical Record Number: [id number]   YOB: 1940  Sex: Female          [facility]  [facility]  Payor Info:  Payor: Treasure Parker / Plan: MEDICARE A AND B / Product Type: Medicare A & B Fee for Service /      Admitting Diagnosis: CVA (cerebral vascular accident) ([facility] ) [I63 9]   Admit Date/Time:  2/18/2020  2:07 PM  Admission Comments: No comment available     Primary Diagnosis:  Intraparenchymal hemorrhage of brain ([place] [id number] )  Principal Problem: Intraparenchymal hemorrhage of brain [facility])    Patient Active Problem List    Diagnosis Date Noted    Intraparenchymal hemorrhage of brain ([facility] ) 02/10/2020    Peripheral arteriosclerosis ([facility] ) 06/21/2019    Osteoporosis screening 03/08/2019    Eczema 12/03/2018    SOB (shortness of breath) 10/26/2018    Radiculopathy of lumbar region 10/02/2018    Corns 10/02/2018    Gastroesophageal reflux disease without esophagitis 06/04/2018    Iron deficiency anemia secondary to inadequate dietary iron intake 06/04/2018    Pain in both feet 03/15/2018    Alkaline phosphatase elevation 03/12/2018    Lumbar disc herniation with radiculopathy 12/08/2017    Hyperlipemia 11/15/2017    Chronic low back pain 10/24/2017    Hyperuricemia 06/23/2017    Pseudogout of left wrist 06/23/2017    Nephrolithiasis 06/21/2017    Acute on chronic diastolic congestive heart failure ([facility] ) 06/02/2017    Chronic venous insufficiency 06/02/2017    Elevated triglycerides with high cholesterol 06/02/2017    CKD (chronic kidney disease), stage III ([facility] ) 05/19/2017    Benign essential hypertension 08/22/2016    Chronic diastolic (congestive) heart failure ([facility] ) 08/22/2016    Hypothyroidism 08/22/2016    Morbid obesity due to excess calories ([facility] ) 08/20/2016    Diverticulitis of colon 08/18/2016    WENDI (obstructive sleep apnea) 05/20/2016    Onychomycosis 04/29/2016    Morbid obesity with body mass index of 40 0-44 9 in adult [facility]) 03/10/2016    Knee pain 05/22/2015    Tarsal tunnel syndrome 06/24/2014    Difficulty in walking 03/24/2014    Plantar fascial fibromatosis 72/51/2933    Umbilical hernia 53/25/3444    Atherosclerosis of arteries of extremities (HCC) 08/28/2013    Hallux abductovalgus with bunions, unspecified laterality 07/26/2013    Chronic gout without tophus 07/02/2013    Arthropathy of knee 09/04/2012    Rupture of popliteal cyst 09/04/2012    Anxiety 09/04/2012    Hiatal hernia 08/27/2012       Physical Therapy:    Weight Bearing Status: Full Weight Bearing  Transfers: Supervision  Bed Mobility: Supervision  Amulation Distance (ft): 150 feet  Ambulation: Supervision  Assistive Device for Ambulation: Rollator  Number of Stairs: 2  Assistive Device for Stairs: Bilateral Hand Rails  Stair Assistance: Supervision(CS)  Ramp: Moderate Assistance  Assistive Device for Ramp: Roller Walker  Discharge Recommendations: Home with:  [address] [facility] [place] [address] with[de-identified] 24 Hour Supervision    Patient making fair progress with skilled PT intervention thusfar  Patient able to engage in appx 30 minutes of activity at a time however does not tolerate extended activity 2* back pain and fatigue  She also has difficulty attending to directions and focusing on a task  At times, patient observed leaving RW aside to perform short distance ambulation without RW  Educated on safety precautions and recommendation to utilize RW at all times  Patient does attempt to reach for external support, ie furniture surfs, however does not lose balance  Plan for family training Friday with DC Saturday with recommended 24 hour S        Occupational Therapy:  Eating: Independent  Grooming: Supervision  Bathing: Incidental Touching  Bathing: Incidental Touching  Upper Body Dressing: Supervision  Lower Body Dressing: Incidental Touching  Toileting: Supervision  Tub/Shower Transfer: Minimal Assistance  Toilet Transfer: Incidental Touching  Cognition: Exceptions to WNL  Cognition: Decreased Attention, Decreased Safety, Decreased Executive Functions, Decreased Memory, Decreased Comprehension  Orientation: Person, Place, Time, Situation  Discharge Recommendations: Home with:  [address] with[de-identified] 24 Hour Supervision, First Floor Setup, Outpatient Occupational Therapy, Family Support       Pt is making progress and currently functioning at overall [facility]- for ADLs and fxnl mobility w/ RW  Cont to be limited by impaired safety awareness, impaired insight into deficits, poor problem solving, impaired cognitive flexibility, poor attention to task, poor frustration tolerance, fatigue, and poor endurance  Plan for pt to d/c home w/ family support and recommending 24 hour supervision and A w/ IADLs 2* cognitive deficits  Family training scheduled for 2/28  Speech Therapy:  Mode of Communication: Verbal  Cognition: Exceptions to WNL  Cognition: Decreased Memory, Decreased Executive Functions, Decreased Attention, Behavioral Considerations  Orientation: Person, Place, Time, Situation  Discharge Recommendations: Home with:  [address] with[de-identified] 24 Hour Supervision, Family Support, Outpatient Speech Therapy  Cognitive linguistic evaluation was initiated where pt demonstrating functional basic level comprehension and expression, however, pt most notably limited by deficits in attention and working memory where pt was significantly tangential and verbose  Pt also presenting w/ decreased insight into deficits and problem solving, impacting safety awareness  Pt may be recommended for 24 hour supervision at time of discharge  Due to current cognitive linguistic deficits, recommending skilled ST services at this time to maximize functional cognitive linguistic skills in order to improve level of independence and safety   Plan to complete remainder of CLQT+ in follow up session to gain additional insight into pt's current deficits and to further assist in guiding treatment plan  Update week of 2/26/2020: Pt continues to be followed for cognitive linguistic skills where pt is making slow progress towards goals  Pt completed the remainder of the CLQT+ with results correlating to overall MODERATE cognitive linguistic deficits at time of assessment and in comparison to age matched peers ranging from 65-81 y/o  Pt's most notable deficits were in attention, executive functions and memory  During therapy sessions, pt continues to present with deficits in attention (divided, sustained), STM recall, working memory, safety, problem solving/judgement and insight into current deficits  Despite continued education, pt remains with decreased awareness, insight and acceptance to understanding cognitive linguistic deficits since stroke  Pt has also been provided with stroke education and pt's family has been educated on current recommendations for 24/hr supervision/assistance, current deficits and recommendations for outpatient services  Will continue to recommend skilled ST services at this time to maximize functional cognitive linguistic skills and safety  Nursing Notes:  Appetite: Good  Diet Type: Regular/House                      Diet Patient/Family Education Complete: Yes    Type of Wound (LDA): Wound                    Type of Wound Patient/Family Education: Yes  Bladder: 7 - Complete [place]     Bladder Patient/Family Education: Yes  Bowel: 6 - Modified [place]     Bowel Patient/Family Education: Yes  Pain Location: Head  Pain Orientation: Bilateral  Pain Score: 0                       Hospital Pain Intervention(s): Medication (See MAR), Distraction, Rest  Pain Patient/Family Education: Yes  Medication Management/Safety  Injectable: (heparin)  Safe Administration: Yes  Medication Patient/Family Education Complete: Yes    Pt admitted S/P R ICH requiring R craniectomy on 2/11  Incision healing ALFREDO  Pt to wear helmet oob    PMH: HTN, CHF, hypothyroidism, CKD 3, HLD  Post-operatively patient completed 7 days of Keppra for seizure ppx  Seizure precautions  Cleared for SQ Heparin for DVT ppx  Repeat CTH done on 2/24  For HTN- SBP goal < 160  Continue amlodipine and losartan  Was on a higher dose of losartan as an outpt (25mg 2x daily)  BP has been at goal  Continue to hold outpt propranolol 40mg every 12 hour  CKD3- Baseline 1 1 - 1 3  Chronic combined systolic and diastolic heart failure- Continue Bumex 2mg daily  Was on a higher dose as an outpt (2mg daily except M/W/F take 2mg 2x daily)  Monitor volume status  Leukocytosis- Likely reactive  Pt is continent of bowel and bladder  CPAP@ HS for sleep apnea  Takes melatonin @ hs for insomnia  Pt is impulsive and requires alarms for safety  Pt slid out of recliner, attempting to reach call bell, no injuries noted  This week we will encourage independence with ADLs  We will monitor lab results and vital signs  We will monitor wound for healing and s/s of infection  We will educate pt about craniectomy precautions and helmet  We will educate patient about repositioning to prevent skin breakdown and perform routine skin checks  We will monitor for adequate pain control  We will monitor for constipation and medicate as per orders  We will increase safety awareness with transfers and keep pt free from falls          Case Management:     Discharge Planning  Living Arrangements: Children  Support Systems: Family members, Children  Assistance Needed: yes  Type of Current Residence: Private residence  [address]: No  Pt is participating well with therapy and is scheduled to dc home later this week  Family training is scheduled for Friday and outpt ot and slp services are scheduled at [facility] for additional dc needs  Is the patient actively participating in therapies?  yes  List any modifications to the treatment plan:     Barriers Interventions Cognition, attention to task,  Speech therapy                     Is the patient making expected progress toward goals? yes  List any update or changes to goals:     Medical Goals: Patient will be medically stable for discharge to [facility] upon completion of rehab program and Patient will be able to manage medical conditions and comorbid conditions with medications and follow up upon completion of rehab program    Weekly Team Goals:   Rehab Team Goals  ADL Team Goal: Patient will require supervision with ADLs with least restrictive device upon completion of rehab program  Transfer Team Goal: Patient will be independent with transfers with least restrictive device upon completion of rehab program  Locomotion Team Goal: Patient will be independent with locomotion with least restrictive device upon completion of rehab program  Cognitive Team Goal: Patient will require supervision for basic and complex tasks upon completion of rehab program    Discussion: pt has made good progress and is returning home Saturday  Pt's memory is a mod a but comprehension is good  Pt is functioning at supervision level for ambulation with a walker, and supervision with adls, min a tub tx  Family training is scheduled for tomorrow and have received verbal education already  outpt ot and slp services to continue at [facility]  Anticipated Discharge Date:  2/29/2020  [facility] Team Members Present: The following team members are supervising care for this patient and were present during this Weekly Team Conference      Physician: Dr Quiana Piper MD  : Misa Jones MSW  Registered Nurse: Taylor Bender, RN, BSN, CRRN  Physical Therapist: CHARISSE VelascoT  Occupational Therapist: Luke Castellanos MS, OTR/L  Speech Therapist: Maura Lopes MS, CCC-SLP  Other:

## 2020-02-27 NOTE — PROGRESS NOTES
02/27/20 1100   Pain Assessment   Pain Assessment No/denies pain   Restrictions/Precautions   Precautions Fall Risk   Braces or Orthoses Craniectomy Helmet   Subjective   Subjective tired   Sit to Stand   Type of Assistance Needed Supervision   Sit to Stand CARE Score 4   Bed-Chair Transfer   Type of Assistance Needed Supervision; Adaptive equipment   Chair/Bed-to-Chair Transfer CARE Score 4   Transfer Bed/Chair/Wheelchair   Adaptive Equipment Roller Walker   Walk 10 Feet   Type of Assistance Needed Supervision; Adaptive equipment   Walk 10 Feet CARE Score 4   Walk 50 Feet with Two Turns   Type of Assistance Needed Supervision; Adaptive equipment   Walk 50 Feet with Two Turns CARE Score 4   Walk 150 Feet   Type of Assistance Needed Supervision; Adaptive equipment   Walk 150 Feet CARE Score 4   Walking 10 Feet on Uneven Surfaces   Type of Assistance Needed Adaptive equipment;Supervision   Comment ramp with RW   Walking 10 Feet on Uneven Surfaces CARE Score 4   Ambulation   Does the patient walk? 2  Yes   Primary Mode of Locomotion Prior to Admission Walk   Distance Walked (feet) 300 ft   Assist Device Roller Walker   Wheelchair mobility   Does the patient use a wheelchair? 0  No   Curb or Single Stair   Style negotiated Curb   Type of Assistance Needed Supervision; Adaptive equipment   Comment with RW   1 Step (Curb) CARE Score 4   4 Steps   Type of Assistance Needed Supervision; Adaptive equipment   Comment using single HR   4 Steps CARE Score 4   Stairs   # of Steps 4   Equipment Use   NuStep 7 min lvl 2   Assessment   Treatment Assessment Pt reports being tired throughout session  Functioning at supervision level with RW for all transfers and mobilitty     Recommendation   Recommendation 24 hour supervision/assist   PT Therapy Minutes   PT Time In 1100   PT Time Out 1130   PT Total Time (minutes) 30   PT Mode of treatment - Individual (minutes) 30   PT Mode of treatment - Concurrent (minutes) 0   PT Mode of treatment - Group (minutes) 0   PT Mode of treatment - Co-treat (minutes) 0   PT Mode of Treatment - Total time(minutes) 30 minutes   PT Cumulative Minutes 570   Therapy Time missed   Time missed?  No

## 2020-02-27 NOTE — PLAN OF CARE
Problem: Potential for Falls  Goal: Patient will remain free of falls  Description  INTERVENTIONS:  - Assess patient frequently for physical needs  -  Identify cognitive and physical deficits and behaviors that affect risk of falls    -  [place] fall precautions as indicated by assessment   - Educate patient/family on patient safety including physical limitations  - Instruct patient to call for assistance with activity based on assessment  - Modify environment to reduce risk of injury  - Consider OT/PT consult to assist with strengthening/mobility  Outcome: Progressing     Problem: PAIN - ADULT  Goal: Verbalizes/displays adequate comfort level or baseline comfort level  Description  Interventions:  - Encourage patient to monitor pain and request assistance  - Assess pain using appropriate pain scale  - Administer analgesics based on type and severity of pain and evaluate response  - Implement non-pharmacological measures as appropriate and evaluate response  - Consider cultural and social influences on pain and pain management  - Notify physician/advanced practitioner if interventions unsuccessful or patient reports new pain  Outcome: Progressing     Problem: INFECTION - ADULT  Goal: Absence or prevention of progression during hospitalization  Description  INTERVENTIONS:  - Assess and monitor for signs and symptoms of infection  - Monitor lab/diagnostic results  - Monitor all insertion sites, i e  indwelling lines, tubes, and drains  - Monitor endotracheal if appropriate and nasal secretions for changes in amount and color  - [place] appropriate cooling/warming therapies per order  - Administer medications as ordered  - Instruct and encourage patient and family to use good hand hygiene technique  - Identify and instruct in appropriate isolation precautions for identified infection/condition  Outcome: Progressing  Goal: Absence of fever/infection during neutropenic period  Description  INTERVENTIONS:  - Monitor WBC    Outcome: Progressing     Problem: SAFETY ADULT  Goal: Maintain or return to baseline ADL function  Description  INTERVENTIONS:  -  Assess patient's ability to carry out ADLs; assess patient's baseline for ADL function and identify physical deficits which impact ability to perform ADLs (bathing, care of mouth/teeth, toileting, grooming, dressing, etc )  - Assess/evaluate cause of self-care deficits   - Assess range of motion  - Assess patient's mobility; develop plan if impaired  - Assess patient's need for assistive devices and provide as appropriate  - Encourage maximum independence but intervene and supervise when necessary  - Involve family in performance of ADLs  - Assess for home care needs following discharge   - Consider OT consult to assist with ADL evaluation and planning for discharge  - Provide patient education as appropriate  Outcome: Progressing  Goal: Maintain or return mobility status to optimal level  Description  INTERVENTIONS:  - Assess patient's baseline mobility status (ambulation, transfers, stairs, etc )    - Identify cognitive and physical deficits and behaviors that affect mobility  - Identify mobility aids required to assist with transfers and/or ambulation (gait belt, sit-to-stand, lift, walker, cane, etc )  - [place] fall precautions as indicated by assessment  - Record patient progress and toleration of activity level on Mobility SBAR; progress patient to next Phase/Stage  - Instruct patient to call for assistance with activity based on assessment  - Consider rehabilitation consult to assist with strengthening/weightbearing, etc   Outcome: Progressing     Problem: DISCHARGE PLANNING  Goal: Discharge to home or other facility with appropriate resources  Description  INTERVENTIONS:  - Identify barriers to discharge w/patient and caregiver  - Arrange for needed discharge resources and transportation as appropriate  - Identify discharge learning needs (meds, wound care, etc )  - Arrange for interpretive services to assist at discharge as needed  - Refer to Case Management Department for coordinating discharge planning if the patient needs post-hospital services based on physician/advanced practitioner order or complex needs related to functional status, cognitive ability, or social support system  Outcome: Progressing     Problem: Prexisting or High Potential for Compromised Skin Integrity  Goal: Skin integrity is maintained or improved  Description  INTERVENTIONS:  - Identify patients at risk for skin breakdown  - Assess and monitor skin integrity  - Assess and monitor nutrition and hydration status  - Monitor labs   - Assess for incontinence   - Turn and reposition patient  - Assist with mobility/ambulation  - Relieve pressure over bony prominences  - Avoid friction and shearing  - Provide appropriate hygiene as needed including keeping skin clean and dry  - Evaluate need for skin moisturizer/barrier cream  - Collaborate with interdisciplinary team   - Patient/family teaching  - Consider wound care consult   Outcome: Progressing     Problem: Nutrition/Hydration-ADULT  Goal: Nutrient/Hydration intake appropriate for improving, restoring or maintaining nutritional needs  Description  Monitor and assess patient's nutrition/hydration status for malnutrition  Collaborate with interdisciplinary team and initiate plan and interventions as ordered  Monitor patient's weight and dietary intake as ordered or per policy  Utilize nutrition screening tool and intervene as necessary  Determine patient's food preferences and provide high-protein, high-caloric foods as appropriate       INTERVENTIONS:  - Monitor oral intake, urinary output, labs, and treatment plans  - Assess nutrition and hydration status and recommend course of action  - Evaluate amount of meals eaten  - Assist patient with eating if necessary   - Allow adequate time for meals  - Recommend/ encourage appropriate diets, oral nutritional supplements, and vitamin/mineral supplements  - Order, calculate, and assess calorie counts as needed  - Recommend, monitor, and adjust tube feedings and TPN/PPN based on assessed needs  - Assess need for intravenous fluids  - Provide specific nutrition/hydration education as appropriate  - Include patient/family/caregiver in decisions related to nutrition  Outcome: Progressing

## 2020-02-27 NOTE — PROGRESS NOTES
02/27/20 0930   Pain Assessment   Pain Assessment No/denies pain   Pain Score No Pain   Restrictions/Precautions   Precautions Bed/chair alarms;Cognitive; Fall Risk;Seizure;Supervision on toilet/commode   Braces or Orthoses Craniectomy Helmet   Comprehension   Comprehension (FIM) 4 - Understands basic info/conversation 75-90% of time   Expression   Expression (FIM) 5 - Needs help/cues only RARELY (< 10% of the time)   Social Interaction   Social Interaction (FIM) 4 - Interacts 75-89% of time   Problem Solving   Problem solving (FIM) 3 - Solves basic problmes 50-74% of time   Memory   Memory (FIM) 3 - Recognizes, recalls/performs 50-74%   Speech/Language/Cognition Assessmetn   Treatment Assessment See below for session note  SLP Therapy Minutes   SLP Time In 0930   SLP Time Out 1030   SLP Total Time (minutes) 60   SLP Mode of treatment - Individual (minutes) 60   SLP Mode of treatment - Concurrent (minutes) 0   SLP Mode of treatment - Group (minutes) 0   SLP Mode of treatment - Co-treat (minutes) 0   SLP Mode of Treatment - Total time(minutes) 60 minutes   SLP Cumulative Minutes 300   Therapy Time missed   Time missed? No     Session Note: Pt participated in skilled ST session focusing on cognitive linguistic skills  Pt appeared brighter this session and more willing to engage in therapies  Pt verbalizing that she is excited to d/c home on Saturday  Began session targeting LTM recall where pt demonstrating ability to recall her family's favorite recipe, along w/ steps needed to make this meal  This was done in order to continue to maintain rapport w/ pt as she frequently has low frustration tolerance w/ cognitive tasks and continues to deny any cognitive deficits   While pt agreeable in engaging in memory and attention tasks for remainder of session, pt continued to make statements such as "Thank God by memory is still good," and "I'm glad my mind is still fine " Pt noted to repeat the same stories this session as she has across the last week  Pt w/ decreased attention to directions of task where she then required repetition of instructions, as well as consistent verbal redirections to tasks due to tangential speech  Pt also w/ decreased recall or insight into requirement of wearing craniectomy helmet when OOB as she briefly took off helmet to apply her glasses but then did not immediately put helmet back on  Pt cued and re-educated on safety precautions and need for wearing helmet at all times when OOB or upright-pt agreeable and promptly put on helmet w/ min assistance w/ strap  Pt completed both visual memory and auditory memory tasks  Presented w/ three different picture scenes and direct instructions for task, pt then able to recall details w/ 22/35 accuracy where pt noted to continue to talk throughout task while coding pictures where speech was at times related to picture scene and at times unrelated  Improved recall to 28/35 accuracy when provided w/ verbal and semantic cues, however, pt verbalized "It's not that I don't remember, it's that I didn't even look there " After first trial when pt making this statement, SLP re-clarified instructions to focus/attend to all details present in picture  Targeting auditory memory, pt was auditorily presented w/ Fo4 words and recalled words by category inclusion w/ 12/17 accuracy  In similar task, pt recalled Fo4 presented words by attribute inclusion w/ 5/8 accuracy  Pt required repetition of stimuli to improve recall of words  During task, pt remained w/ tangential and ongoing verbal speech, however, SLP cued pt to refrain from off topic speech as this was impacting her recall of words  Improvement noted when pt used verbal rehearsal strategy and minimized unrelated comments/convo   When returning back to pt's room, environmental services had just finished mopping pt's floor to which pt continued to attempt to ambulate across her room, stating "I'll be fine, I'll be careful " Pt required direct verbal cues for safety and to refrain from ambulating  Recommending continued skilled ST services at this time to further maximize functional cognitive linguistic skills in order to improve safety and level of independence

## 2020-02-28 PROCEDURE — 97130 THER IVNTJ EA ADDL 15 MIN: CPT

## 2020-02-28 PROCEDURE — 97116 GAIT TRAINING THERAPY: CPT

## 2020-02-28 PROCEDURE — 97129 THER IVNTJ 1ST 15 MIN: CPT

## 2020-02-28 PROCEDURE — 97530 THERAPEUTIC ACTIVITIES: CPT

## 2020-02-28 PROCEDURE — 97535 SELF CARE MNGMENT TRAINING: CPT

## 2020-02-28 PROCEDURE — 99233 SBSQ HOSP IP/OBS HIGH 50: CPT

## 2020-02-28 RX ORDER — ATORVASTATIN CALCIUM 40 MG/1
40 TABLET, FILM COATED ORAL EVERY EVENING
Qty: 30 TABLET | Refills: 0 | Status: SHIPPED | OUTPATIENT
Start: 2020-02-28 | End: 2020-03-13 | Stop reason: SDUPTHER

## 2020-02-28 RX ORDER — BUMETANIDE 2 MG/1
2 TABLET ORAL DAILY
Qty: 30 TABLET | Refills: 0 | Status: SHIPPED | OUTPATIENT
Start: 2020-02-29 | End: 2020-05-26

## 2020-02-28 RX ORDER — AMLODIPINE BESYLATE 10 MG/1
10 TABLET ORAL DAILY
Qty: 30 TABLET | Refills: 0 | Status: SHIPPED | OUTPATIENT
Start: 2020-02-29 | End: 2020-03-13 | Stop reason: SDUPTHER

## 2020-02-28 RX ADMIN — OXYCODONE HYDROCHLORIDE 2.5 MG: 5 TABLET ORAL at 18:04

## 2020-02-28 RX ADMIN — ATORVASTATIN CALCIUM 40 MG: 40 TABLET, FILM COATED ORAL at 18:04

## 2020-02-28 RX ADMIN — ACETAMINOPHEN 650 MG: 325 TABLET ORAL at 14:26

## 2020-02-28 RX ADMIN — HEPARIN SODIUM 5000 UNITS: 5000 INJECTION INTRAVENOUS; SUBCUTANEOUS at 21:42

## 2020-02-28 RX ADMIN — PANTOPRAZOLE SODIUM 40 MG: 40 TABLET, DELAYED RELEASE ORAL at 05:15

## 2020-02-28 RX ADMIN — AMLODIPINE BESYLATE 10 MG: 10 TABLET ORAL at 08:41

## 2020-02-28 RX ADMIN — BUMETANIDE 2 MG: 2 TABLET ORAL at 08:42

## 2020-02-28 RX ADMIN — ACETAMINOPHEN 650 MG: 325 TABLET ORAL at 05:17

## 2020-02-28 RX ADMIN — LEVOTHYROXINE SODIUM 112 MCG: 112 TABLET ORAL at 05:15

## 2020-02-28 RX ADMIN — HEPARIN SODIUM 5000 UNITS: 5000 INJECTION INTRAVENOUS; SUBCUTANEOUS at 14:23

## 2020-02-28 RX ADMIN — HEPARIN SODIUM 5000 UNITS: 5000 INJECTION INTRAVENOUS; SUBCUTANEOUS at 05:15

## 2020-02-28 RX ADMIN — SENNOSIDES AND DOCUSATE SODIUM 1 TABLET: 8.6; 5 TABLET ORAL at 18:04

## 2020-02-28 RX ADMIN — MELATONIN 3 MG: at 21:42

## 2020-02-28 RX ADMIN — LOSARTAN POTASSIUM 25 MG: 25 TABLET, FILM COATED ORAL at 08:41

## 2020-02-28 NOTE — PROGRESS NOTES
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  · Keppra completed  · Helmet when out of bed  · SBP goal < 160      HTN  · Remains stable on Amlodipine/Losartan  Was on a higher dose of losartan as an outpt (25mg 2x daily)  · Continue to hold outpt propranolol 40mg every 12 hours 2/2 bradycardia  · No changes since well within goal of <930 systolic and no changes for home     CKD stage III; baseline 1 1-1 3  · stable  · Follows with Dr Ty Leonard as an outpt      Chronic diastolic heart failure; LVEF 65%, mod-severe MR/mild-mod TR  · Continue Bumex 2mg daily  Was on a higher dose as an outpt (2mg daily except M/W/F take 2mg 2x daily)  · Stable     The above assessment and plan was reviewed and updated as determined by my evaluation of the patient on 2/28/2020      Labs:   Results from last 7 days   Lab Units 02/27/20  0642 02/24/20  0846   WBC Thousand/uL 6 54 12 40*   HEMOGLOBIN g/dL 11 0* 10 6*   HEMATOCRIT % 34 7* 34 1*   PLATELETS Thousands/uL 178 206     Results from last 7 days   Lab Units 02/27/20  0642 02/24/20  0651   SODIUM mmol/L 139 136   POTASSIUM mmol/L 4 0 4 3   CHLORIDE mmol/L 103 101   CO2 mmol/L 30 29   BUN mg/dL 24 20   CREATININE mg/dL 1 09 1 03   CALCIUM mg/dL 9 1 9 1                   Review of Scheduled Meds:    Current Facility-Administered Medications:  acetaminophen 650 mg Oral Q6H PRN Karley Horn MD   amLODIPine 10 mg Oral Daily Karley Horn MD   atorvastatin 40 mg Oral QPM Karley Horn MD   bisacodyl 10 mg Rectal Daily PRN Karley Horn MD   bumetanide 2 mg Oral Daily Karley Horn MD   heparin (porcine) 5,000 Units Subcutaneous Q8H [address] Karley Horn MD   levothyroxine 112 mcg Oral Early Morning Karley Horn MD   losartan 25 mg Oral Daily Nighat Garrison MD   melatonin 3 mg Oral HS Nighat Garrison MD   ondansetron 4 mg Oral Q6H PRN Nighat Garrison MD   oxyCODONE 2 5 mg Oral Q6H PRN Nighat Garrison MD   pantoprazole 40 mg Oral Early Morning Nighat Garrison MD   polyethylene glycol 17 g Oral Daily PRN Nighat Garrison MD   senna-docusate sodium 1 tablet Oral BID Nighat Garrison MD       Subjective/ HPI: Patients overnight issues or events were reviewed with nursing or staff during rounds or morning huddle session  No new or overnight issues  Offers no complaints  ROS:   A 10 point ROS was performed; negative except as noted above  *Labs /Radiology studiesReviewed  *Medications Reviewed and reconciled as needed  *Please refer to order section for additional ordered labs studies  *Case discussed with primary attending during morning huddle case rounds    Physical Examination:  Vitals:   Vitals:    02/27/20 2046 02/27/20 2239 02/28/20 0601 02/28/20 1314   BP: 165/69 120/80 127/60 126/59   BP Location: Right arm Right arm Left arm Left arm   Pulse: 74  74 73   Resp: 18  18 18   Temp: 98 2 °F (36 8 °C)  98 2 °F (36 8 °C) 98 8 °F (37 1 °C)   TempSrc: Oral  Oral Oral   SpO2: 98%  95% 98%   Weight:       Height:           General Appearance: no distress, conversive  HEENT: PERRLA, conjuctiva normal; oropharynx clear; mucous membranes moist; scalp incision is w/o erythema/drainage, +healed  Neck:  Supple, no JVD; no pain with movement  Lungs: CTA, normal respiratory effort, no retractions, expiratory effort normal  CV: regular rate and rhythm; no rubs/murmurs/gallops, PMI normal   ABD: soft; ND/NT; +BS  EXT: no edema  Skin: normal turgor, normal texture, no rashes  Psych: affect normal, mood normal  Neuro: AAO; RASCON 5/5      The above physical exam was reviewed and updated as determined by my evaluation of the patient on 2/28/2020      Invasive Devices     None                    VTE Pharmacologic Prophylaxis: Heparin  Code Status: Level 3 - DNAR and DNI  Current Length of Stay: 10 day(s)      Total time spent:  30 minutes with more than 50% spent counseling/coordinating care  Counseling includes discussion with patient re: progress  and discussion with patient of his/her current medical state/information  Coordination of patient's care was performed in conjunction with primary service  Time invested included review of patient's labs, vitals, and management of their comorbidities with continued monitoring  In addition, this patient was discussed with medical team including physician and advanced extenders  The care of the patient was extensively discussed and appropriate treatment plan was formulated unique for this patient  ** Please Note:  voice to text software may have been used in the creation of this document   Although proof errors in transcription or interpretation are a potential of such software**

## 2020-02-28 NOTE — DISCHARGE INSTRUCTIONS
Discharge Instructions  Craniectomy    Activity:  1  Do not lift, pushing or pulling more than 10 pounds for 2 weeks  2  Avoid bending, lifting and twisting for 2 weeks  3  Walk as tolerated  Recommend at least four short walks daily  HELMET ON WHEN OUT OF BED  4  No driving until cleared by Neurosurgery  5  Do not use a hair dryer, and avoid hair products such as mousse, oils, and gels  Do not brush your hair away from the incision since this will put strain on the suture line  6  Do not dye or perm hair for 6 weeks or until cleared by physician  7  Continue to change bed linens and pajamas more frequently  Wear clean clothes daily  Surgical incision care:  1  Keep dressings in place for 3 days  2  After 3 days, incisions may be left open to air, but should remain clean  3  Keep incisions dry for 3 days  4  May shower after 3 days using a baby shampoo including head incision  Rinse off shampoo and pat dry  Avoid, rubbing incision but gently massage hair  a  Continue to use clean towel and washcloth with each shower for 2 weeks post-op  5  Do not immerse the incisions in water for 6 weeks or until cleared  6  Do not apply any creams or ointments to the incision, unless otherwise instructed by [address] [place] [address]  7  Contact office if increasing redness, drainage, pain or swelling occurs around the incisions or if you develop a fever greater than 101F  8  Do not dye/perm hair or use any hair products until cleared by Neurosurgery  Postoperative medication:  1  [facility] will provide pain medication in the postoperative period  All prescriptions must come from a single provider  a  Take medications as prescribed  Call office with any questions/concerns  b  May use over the counter Tylenol  No NSAIDs (ie  Ibuprofen, Aleve, Advil, Naproxen, etc )  2  Please contact office for questions regarding dosage and modifications    3  No antiplatelet or anticoagulation medication until cleared by [employer] [facility], unless otherwise instructed  Please contact [facility] if you have any questions about the effects of any of your medications on blood clotting  4  Do not operate heavy machinery or vehicles while taking sedating medications  5  Use a bowel regimen while on opioids as they induce constipation  Ie  Senokot-S, Miralax, Colace, etc  Increase fiber and water intake  ** Please notify the office if incision becomes red, swollen, tender, or has increased drainage, and temp>101  Return to the ER if you experience increased headache, difficulty walking, change in vision or speech, drowsiness, weakness, nausea/vomiting, or seizures  **    DISCHARGE INSTRUCTIONS: Yanet [address]wood 64 22    Bring these instructions with you to your Outpatient Physician appointments so they can order and follow-up any additional lab work or imaging recommended at time of discharge  If you have any questions or concerns regarding your acute rehabilitation stay including issues with medications, rehabilitation, and follow-up plan, please call:          [address] [place] [address] Unit at [phone] or [phone]  Should you develop fevers, chills, new weakness, changes in sensation, difficulty speaking, facial weakness, confusion, shortness of breath, chest pain, or other concerning symptoms please call 911 and/or obtain transportation to nearest ER immediately  Should you develop worsening pain, swelling, or drainage notify your surgeon right away or obtain transportation to nearest ER for evaluation  PHYSICIANS to see:  Please see your doctors listed in the follow up providers section of your discharge paperwork, and take the discharge paperwork with you to your appointments  LAB WORK to recommended after discharge:   Follow-up lab work at discretion of your outpatient physicians to be determined at time of your appointments  IMAGING to follow-up:    Please CALL [facility] Scheduling at [phone] to schedule or confirm your follow-up recommended studies:    CT head around 3/11/2020    WEIGHTBEARING/ACTIVITY PRECAUTIONS to follow:  Weightbearing as tolerated  24-7 Supervision   Crani helmet on when out of bed    Driving restrictions: You are recommended against driving until cleared by an outpatient physician and cleared through a formal driving evaluation  Driving at current time can increase your risk of injury and can increase risk of injury of others  Alcohol restrictions: You are recommended to not drink alcohol at this time unless cleared by an outpatient physician  Combining alcohol with your current medications can increase your risk of injury which could be severe  Drinking alcohol in your current functional condition can increase your risk of injury which could be severe  Drinking alcohol given your current health problems can lead to increased medical complications which could be severe  MEDICATIONS:  Please see a full list of your medications outlined in the After Visit Summary that is attached to these Discharge Instructions  Please note changes may have been made to your medications please refer to your discharge paperwork for your current medications and take this list with you to all your doctors appointments for your doctors to review  Please do not resume a home medication unless the medication reconciliation sheet indicates to do so, please do not assume that a medication that you were given a prescription for is the same as a medication you have at home based on both medications having the same name as dosages and frequency may have changed        Unless specifically noted in your medication list provided to you in your discharge paper work do not resume prior vitamins, minerals, or supplements you may have been taking prior to your hospitalization unless instructed by an outpatient physician in the future  NSAID Warning:  Please avoid NSAID (including but not limited to advil, aleve, motrin, naproxen, ibuprofen, mobic, meloxicam, diclofenac etc) medications as NSAID medications may increase your risk of bleeding (which can be life-threatening), stroke    Please note a summary of your hospital stay with relevant information for your doctors will try to be sent to them  Please confirm with your doctors at your follow up visits that they have received this summary and have them contact [address] [place] [address] if they have not received them along with any other medical records they may require  [place] Phone Number:  [phone]                Hemorrhagic Stroke   WHAT YOU NEED TO KNOW:   A hemorrhagic stroke happens when a blood vessel in your brain bursts  This may happen if the blood vessel wall is weak, or if a blood clot gets stuck in blood vessel  Blood then flows out of the vessel and damages brain tissue  Blood may collect within your brain or between layers of tissue that protect the brain  DISCHARGE INSTRUCTIONS:   Call 911 for any of the following:   · You have any of the following signs of a stroke:      ¨ Numbness or drooping on one side of your face     ¨ Weakness in an arm or leg    ¨ Confusion or difficulty speaking    ¨ Dizziness, a severe headache, or vision loss    ·            · You have a seizure  Seek care immediately if:   · Your arm or leg feels warm, tender, and painful  It may look swollen and red  · You have trouble swallowing  · Your blood pressure or blood sugar level is higher or lower than you were told it should be  · You fall and have unusual and heavy bleeding  Contact your healthcare provider or neurologist if:   · You have questions or concerns about your condition or care      Medicines:   · Medicines  may be given to treat high cholesterol, high blood pressure, or diabetes  You may also need medicine to prevent seizures  · Take your medicine as directed  Contact your healthcare provider if you think your medicine is not helping or if you have side effects  Tell him or her if you are allergic to any medicine  Keep a list of the medicines, vitamins, and herbs you take  Include the amounts, and when and why you take them  Bring the list or the pill bottles to follow-up visits  Carry your medicine list with you in case of an emergency  Warning signs of a stroke: The word F A S T   can help you remember and recognize warning signs of a stroke  · F = Face:  One side of the face droops  · A = Arms:  One arm starts to drop when both arms are raised  · S = Speech:  Speech is slurred or sounds different than usual     · T = Time:  A person who is having a stroke needs to be seen immediately  A stroke is a medical emergency that needs immediate treatment  Some medicines and treatments work best if given within a few hours of a stroke  ·        Follow up with your healthcare provider or neurologist as directed: You may need regular tests of your brain function  Write down your questions so you remember to ask them during your visits  Go to rehabilitation (rehab) as directed:  Rehab is an important part of treatment  Physical therapists can help you gain strength and build endurance  Occupational therapists teach you new ways to do daily activities, such as getting dressed  Therapy can help you improve your ability to walk or keep your balance  You may start slowly and start doing more difficult tasks over time  Your therapy may include tasks or movements you will need to do for everyday activities  An example is being able to raise or lower yourself from a chair  A speech therapist helps you relearn or improve your ability to talk and swallow  Prevent another hemorrhagic stroke:   · Manage health conditions  Take your medicine as directed   Check your blood pressure and blood sugar levels as directed  Keep a record and bring it to your follow-up visits  Control your blood sugar level if you have hyperglycemia or diabetes  · Do not smoke cigarettes or use illegal drugs  Nicotine and other chemicals in cigarettes and cigars can cause blood vessel damage  Nicotine and illegal drugs both increase your risk for a stroke  Ask your healthcare provider for information if you currently smoke and need help to quit  E-cigarettes or smokeless tobacco still contain nicotine  Talk to your healthcare provider before you use these products  · Do not drink alcohol  Heavy alcohol use increases your risk for any type of stroke  · Eat a variety of healthy foods  Healthy foods include whole-grain breads, low-fat dairy products, beans, lean meats, and fish  Eat at least 5 servings of fruits and vegetables each day  Choose foods that are low in fat, cholesterol, salt, and sugar  Eat foods that are high in potassium, such as potatoes and bananas  · Exercise as directed  Activity is important for preventing another stroke  You may need to work with an exercise therapist to learn how to exercise safely  Exercise may help you be able to do your normal activities more easily  Exercise also helps control your blood pressure and weight  · Manage stress  Stress can increase your blood pressure  Find new ways to relax, such as deep breathing or listening to music  What you need to know about depression:  Depression can happen after a stroke  Talk to your healthcare provider if you have depression that continues or is getting worse  Your provider may be able to help treat your depression  Your provider can also recommend support groups for you to join  A support group is a place to talk with others who have had a stroke  It may also help to talk to friends and family members about how you are feeling   Tell your family and friends that if they see these signs, to let your healthcare provider know  You may show any of the following signs of depression:  · Extreme sadness    · Avoiding social interaction with family or friends    · A lack of interest in things you once enjoyed    · Irritability    · Trouble sleeping    · Low energy levels    · A change in eating habits or sudden weight gain or loss  For support and more information:   · National Stroke Association  [address] , [place] [address]  Phone: 1- 194 - 744-7210  Web Address: [employer]  org  © 2017 [employer] Information is for End User's use only and may not be sold, redistributed or otherwise used for commercial purposes  All illustrations and images included in CareNotes® are the copyrighted property of [employer]  or [employer]  The above information is an  only  It is not intended as medical advice for individual conditions or treatments  Talk to your doctor, nurse or pharmacist before following any medical regimen to see if it is safe and effective for you  Craniotomy for a Brain Bleed   WHAT YOU NEED TO KNOW:   A craniotomy is surgery to remove part of the skull bone  This lets the surgeon fix problems in the brain  A craniotomy may be done to control bleeding and decrease pressure in the brain  Bleeding or swelling may be caused by a stroke, a blood vessel that bursts, or a head injury  DISCHARGE INSTRUCTIONS:   Call 911 or have someone else call for any of the following:   · You have any of the following signs of a stroke:      ¨ Numbness or drooping on one side of your face     ¨ Weakness in an arm or leg    ¨ Confusion or difficulty speaking    ¨ Dizziness, a severe headache, or vision loss    · You feel lightheaded, short of breath, and have chest pain  · You cough up blood  · You have trouble breathing  · You have a seizure  · You cannot be woken  Seek care immediately if:   · Blood soaks through your bandage      · Your stitches come apart  · You have a severe headache and a stiff neck  · You are confused  · You have changes in your vision  · You fall and hit your head  · Your arm or leg feels warm, tender, and painful  It may look swollen and red  Contact your healthcare provider if:   · You have a fever or chills  · Your wound is red, swollen, or draining pus  · You have nausea or are vomiting  · Your skin is itchy, swollen, or you have a rash  · You feel anxious or depressed  · You continue to have a headache after you take your medicine  · You have questions or concerns about your condition or care  Medicines: You may need any of the following:  · Antibiotics  help prevent a bacterial infection  · Prescription pain medicine  may be given  Ask your healthcare provider how to take this medicine safely  · Seizure medicine  helps control or prevent seizures  · Acetaminophen  decreases pain  It is available without a doctor's order  Ask how much to take and how often to take it  Follow directions  Read the labels of all other medicines you are using to see if they also contain acetaminophen, or ask your doctor or pharmacist  Acetaminophen can cause liver damage if not taken correctly  Do not use more than a total of 4 grams (4,000 milligrams) of acetaminophen in one day  · Take your medicine as directed  Contact your healthcare provider if you think your medicine is not helping or if you have side effects  Tell him or her if you are allergic to any medicine  Keep a list of the medicines, vitamins, and herbs you take  Include the amounts, and when and why you take them  Bring the list or the pill bottles to follow-up visits  Carry your medicine list with you in case of an emergency  Care for your wound as directed:  Ask your surgeon when your wound can get wet  Carefully wash around the wound with soap and water   Ask your healthcare provider if you need to use a certain type of soap or shampoo  Do not scrub your wound  Do not put hair spray, gel, or lotion on your scalp unless your healthcare provider says it is okay  Dry the area and put on new, clean bandages as directed  Change your bandages when they get wet or dirty  Do not swim or take a bath until your healthcare provider says it is okay  Your healthcare provider may tell you to wear a soft hat to protect the area  Self-care:   · Do not smoke  Nicotine and other chemicals in cigarettes and cigars can delay healing  Ask your healthcare provider for information if you currently smoke and need help to quit  E-cigarettes or smokeless tobacco still contain nicotine  Talk to your healthcare provider before you use these products  · Do not drink alcohol  Ask your healthcare provider if it is safe for you to drink  Alcohol can prevent healing  It can also make your headache, dizziness, or balance worse  · Keep your head elevated when you sleep  This will help decrease swelling and pain  Prop your head on 2 to 3 pillows or blankets to keep it elevated comfortably  Go to therapy as directed: Injury to your brain may cause problems with movement, speech, or your ability to take care of yourself  You may need physical, occupational, or speech therapy to help you manage these problems  A physical therapist teaches you exercises to help improve movement and strength, and to decrease pain  An occupational therapist teaches you skills to help with your daily activities  A speech therapist helps you relearn or improve your ability to talk and swallow  Activity:  Rest as directed  Take short naps throughout the day if you get tired  Do not lift anything heavier than 5 pounds  Do not play contact sports  Do not drive until your healthcare provider says it is okay  Ask your healthcare provider what activities are safe for you to do  Increase your activity gradually as directed   It may take several weeks for you to get stronger and be able to do your usual activities  Follow up with your healthcare provider as directed: You may need to return for tests  Write down your questions so you remember to ask them during your visits  © 2017 [employer] Information is for End User's use only and may not be sold, redistributed or otherwise used for commercial purposes  All illustrations and images included in CareNotes® are the copyrighted property of [employer]  or [employer]  The above information is an  only  It is not intended as medical advice for individual conditions or treatments  Talk to your doctor, nurse or pharmacist before following any medical regimen to see if it is safe and effective for you

## 2020-02-28 NOTE — PROGRESS NOTES
02/28/20 1230   Pain Assessment   Pain Assessment No/denies pain   Pain Score No Pain   Restrictions/Precautions   Precautions Bed/chair alarms;Cognitive; Fall Risk;Supervision on toilet/commode   Weight Bearing Restrictions No   ROM Restrictions No   Braces or Orthoses Craniectomy Helmet   Cognition   Overall Cognitive Status Impaired   Arousal/Participation Alert; Cooperative   Subjective   Subjective no new c/o, agreeable to PT    Roll Left and Right   Type of Assistance Needed Supervision   Amount of Physical Assistance Provided No physical assistance   Roll Left and Right CARE Score 4   Sit to Lying   Type of Assistance Needed Supervision   Amount of Physical Assistance Provided No physical assistance   Sit to Lying CARE Score 4   Lying to Sitting on Side of Bed   Type of Assistance Needed Supervision   Amount of Physical Assistance Provided No physical assistance   Lying to Sitting on Side of Bed CARE Score 4   Sit to Stand   Type of Assistance Needed Supervision; Adaptive equipment   Amount of Physical Assistance Provided No physical assistance   Sit to Stand CARE Score 4   Bed-Chair Transfer   Type of Assistance Needed Supervision; Adaptive equipment   Amount of Physical Assistance Provided No physical assistance   Chair/Bed-to-Chair Transfer CARE Score 4   Walk 10 Feet   Type of Assistance Needed Supervision; Adaptive equipment   Amount of Physical Assistance Provided No physical assistance   Walk 10 Feet CARE Score 4   Walk 50 Feet with Two Turns   Type of Assistance Needed Adaptive equipment;Supervision   Amount of Physical Assistance Provided No physical assistance   Walk 50 Feet with Two Turns CARE Score 4   Walk 150 Feet   Type of Assistance Needed Supervision; Adaptive equipment   Amount of Physical Assistance Provided No physical assistance   Walk 150 Feet CARE Score 4   Ambulation   Does the patient walk? 2   Yes   Primary Mode of Locomotion Prior to Admission Walk   Distance Walked (feet) 150 ft  (300, short distance in PT gym no AD )   Assist Device Roller Walker   Gait Pattern WNL   Limitations Noted In Endurance   Walk Assist Level Close Supervision   Wheel 50 Feet with Two Turns   Reason if not Attempted Activity not applicable   Wheel 50 Feet with Two Turns CARE Score 9   Wheel 150 Feet   Reason if not Attempted Activity not applicable   Wheel 411 Feet CARE Score 9   Wheelchair mobility   Does the patient use a wheelchair? 0  No   12 Steps   Reason if not Attempted Activity not applicable   12 Steps CARE Score 9   Stairs   Findings completed in AM session    Picking Up Object   Type of Assistance Needed Supervision; Adaptive equipment   Amount of Physical Assistance Provided No physical assistance   Comment in stance with use of reacher and single UE support on RW retreived marker from floor level  Pt owns reacher at home    Picking Up Object CARE Score 4   Therapeutic Interventions   Strengthening repeated STS without BUE support x8    Flexibility seated BLE passive stretch hs and gastroc    Neuromuscular Re-Education short distance ambulation no RW approx 15' x3    Assessment   Treatment Assessment Short session focus on functional mobility, balance, strengthening  Pt overall CS level with use of RW for all mobility  Trialed short distance amb no AD to inc balance and righting reacctions, pt reaching out for furniture  educated pt that she will require use of RW for all mobility at home and to not rely on furniture walking for mobility  Pt with dec insight into deficits will require 24/7S provided by family at home  Set to d/c home tomorrow 2/29/20 with cont OPPT to [facility] function and safety  Family/Caregiver Present no    PT Barriers   Physical Impairment Decreased strength;Decreased endurance; Impaired balance;Decreased mobility; Decreased coordination;Decreased cognition;Decreased safety awareness   Functional Limitation Ramp negotiation;Stair negotiation;Car transfers   Plan   Treatment/Interventions Functional transfer training;LE strengthening/ROM; Elevations; Therapeutic exercise; Endurance training;Cognitive reorientation;Patient/family training;Equipment eval/education; Bed mobility;Gait training   Progress Progressing toward goals   Recommendation   Recommendation Outpatient PT;24 hour supervision/assist   Equipment Recommended Walker   PT Therapy Minutes   PT Time In 1230   PT Time Out 1300   PT Total Time (minutes) 30   PT Mode of treatment - Individual (minutes) 30   PT Mode of treatment - Concurrent (minutes) 0   PT Mode of treatment - Group (minutes) 0   PT Mode of treatment - Co-treat (minutes) 0   PT Mode of Treatment - Total time(minutes) 30 minutes   PT Cumulative Minutes 660   Therapy Time missed   Time missed?  No

## 2020-02-28 NOTE — PROGRESS NOTES
02/28/20 1030   Pain Assessment   Pain Assessment No/denies pain   Restrictions/Precautions   Precautions Bed/chair alarms;Cognitive; Fall Risk;Supervision on toilet/commode   Braces or Orthoses Craniectomy Helmet   Comprehension   Comprehension (FIM) 4 - Understands basic info/conversation 75-90% of time   Expression   Expression (FIM) 5 - Needs help/cues only RARELY (< 10% of the time)   Social Interaction   Social Interaction (FIM) 5 - Interacts appropriately with others 90% of time   Problem Solving   Problem solving (FIM) 3 - Solves basic problmes 50-74% of time   Memory   Memory (FIM) 3 - Recognizes, recalls/performs 50-74%   Speech/Language/Cognition Assessmetn   Treatment Assessment Focus of session w/ pt was towards family training which occurred w/ pt's son, dtr in law and grandson  Upon introduction to all parties, pt's dtr in law verbalized meeting primary SLP over the weekend on Sunday which majority of concerns, cognitive impairments which continue to present have been discussed along w/ all safety recommendations  Current SLP re-educating family on pt's current deficits which present and impacts overall functional safety, including decreased attention both internal and external, along w/ decreased ST memory recall, decreased problem solving, sequencing, reasoning, management of finances and medication as well as decreased insight to deficits  Example was provided by SLP to family in regards to [address] [place] [address] session yesterday in which pt took off craniectomy helmet to place glasses, but did not place helmet back on afterwards, needing SLP to cue pt to place craniectomy helmet back on due to decreased recall and attention to task  SLP continued to stress to family that the craniectomy helmet MUST be worn at [facility], which family was able to verbalize awareness of consequences if it is NOT worn   SLP providing further examples of activities which were completed this week in which pt continued to demonstrate difficulty in completing (ie: visual recall vs auditory recall tasks, problem solving picture cards), where family asking appropriate questions in regards to activities to engage pt w/ at time of discharge, which SLP elicited puzzles, word searches, any card games, scrabble, other word games, even engaging in compute use which pt did prior to admission  SLP additionally stressing to family in regards to supervising and monitoring finances as well as medications at time of discharge due to changes in cognition  Lastly, SLP stressing to family about pt's overall verbosity, which can easily distract pt in any functional mobility task, which family aware to redirect immediately if noting tangential and verbose speech during tasks  SLP stating that OP ST services are continued to be recommended at time of discharge which family reported will be setup soon  At end of session, pt and pt's family did not offer any further questions, but SLP did state that if any do come up once home, please call with any questions  SLP Therapy Minutes   SLP Time In 56   SLP Time Out 1100   SLP Total Time (minutes) 30   SLP Mode of treatment - Individual (minutes) 30   SLP Mode of treatment - Concurrent (minutes) 0   SLP Mode of treatment - Group (minutes) 0   SLP Mode of treatment - Co-treat (minutes) 0   SLP Mode of Treatment - Total time(minutes) 30 minutes   SLP Cumulative Minutes 330   Therapy Time missed   Time missed?  No

## 2020-02-28 NOTE — PROGRESS NOTES
02/28/20 1300   Pain Assessment   Pain Assessment No/denies pain   Pain Score No Pain   Restrictions/Precautions   Precautions Bed/chair alarms;Cognitive; Fall Risk;Supervision on toilet/commode   Weight Bearing Restrictions No   ROM Restrictions No   Braces or Orthoses Craniectomy Helmet   Lifestyle   Autonomy "I can sleep in my own bed tomorrow night "   Sit to Stand   Type of Assistance Needed Supervision   Amount of Physical Assistance Provided No physical assistance   Sit to Stand CARE Score 4   Bed-Chair Transfer   Type of Assistance Needed Supervision   Amount of Physical Assistance Provided No physical assistance   Chair/Bed-to-Chair Transfer CARE Score 4   Cognition   Overall Cognitive Status Impaired   Arousal/Participation Alert; Cooperative   Attention Attends with cues to redirect   Orientation Level Oriented X4;Oriented to person;Oriented to place;Oriented to time;Oriented to situation   Memory Decreased short term memory;Decreased recall of recent events;Decreased recall of precautions   Following Commands Follows one step commands with increased time or repetition   Comments Pt asked to ID 8 errors made to blank calendar  Able to solve 3/8 correctly without cues, able to find remaining errors w/ inc time and Mod prompts to maximize problem solving to reach correct answer  Req visual model of correct calendar as well  Add'lly focused on setting clock hands, pt w/ inc difficulty making noticeable short and long hand req cues to refrain from drawing 2 long hands  Often times pt flipped hour and minutes req cues to correct  Inc time to complete w/ edu on errors, however at end of act pt stated, "See I can do that " cont to demo poor insight as pt req Mod-Max A for worksheet  Assessment   Treatment Assessment Seen for skilled OT w/ focus on topographical orientation and cogitive retraining   Details noted above on cognitive activities, cont to demo poor insight into deficits, dec problem solving, and dec STM  Cont to recommend 24 hour supervision to inc safety and dec fall risk upon d/c home w/ family  Pt was left resting in recliner w/ all needs within reach and chair alarm activated  Prognosis Fair   Problem List Decreased endurance; Impaired balance;Decreased mobility;Orthopedic restrictions;Pain   Recommendation   OT Discharge Recommendation 24 hour supervision/assist  (OP OT)   OT Therapy Minutes   OT Time In 1300   OT Time Out 1340   OT Total Time (minutes) 40   OT Mode of treatment - Individual (minutes) 40   OT Mode of treatment - Concurrent (minutes) 0   OT Mode of treatment - Group (minutes) 0   OT Mode of treatment - Co-treat (minutes) 0   OT Mode of Treatment - Total time(minutes) 40 minutes   OT Cumulative Minutes 790   Therapy Time missed   Time missed?  No

## 2020-02-28 NOTE — DISCHARGE SUMMARY
This is a non-billable encounter    Discharge Summary - Fitz Bruno [de-identified] y o  female MRN: [id number]  Unit/Bed#: -01 Encounter: [id number]    Admission Date: 2/18/2020     Discharge Date: 2/29/2020    Rehabilitation/Etiologic Diagnosis:   Stroke - ICH:  01 4  No paresis -    Discharge Diagnoses:      Patient Active Problem List   Diagnosis    Morbid obesity due to excess calories ([facility] )    Benign essential hypertension    Chronic diastolic (congestive) heart failure (HCC)    Hypothyroidism    Arthropathy of knee    CKD (chronic kidney disease), stage III ([facility] )    Difficulty in walking    Elevated triglycerides with high cholesterol    Hyperlipemia    Hyperuricemia    Morbid obesity with body mass index of 40 0-44 9 in adult ([place] [address] )    WENDI (obstructive sleep apnea)    Gastroesophageal reflux disease without esophagitis    Iron deficiency anemia secondary to inadequate dietary iron intake    Intraparenchymal hemorrhage of brain [facility])       Acute Rehabilitation Center Course:   (with significant findings, care, complications, treatment, and services provided):      Rosalind Wise is a [de-identified] y o  female with HTN, CHF, hypothyroidism, CKD 3, HLD who presented to the [facility] [employer] [facility] 2/10/20 with mental status changes, vomiting, and headache   She was found to have a 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   She required a craniectomy and evacuation performed by Dr Kirstin Santana on 2/11/20   Post-operatively patient completed 7 days of Keppra for seizure ppx  Maintained in a cranial helmet while OOB   Cleared for SQ Heparin for DVT ppx   Further work-up revealed no mass, ischemia or critical stenosis   It was recommended to repeat CTH in 1 month   Patient found to have functional deficits from her baseline   Admitted to [facility] on 2/18/20         Plan:   · Rehabilitation plan:  Function much improved overall with fair amount residual cognitive deficits and domains of insight/safety awareness  Completed acute comprehensive interdisciplinary inpatient rehabilitation include intensive skilled therapies (PT, OT, ST) as previously outlined with oversight and management by rehabilitation physician, inpatient rehab level nursing, case management and weekly interdisciplinary team meetings  Follow-up with PMR after discharge   · 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 by Dr Lala Diaz on 2/11/20  Cranial precautions   +Helmet while OOB   F/U CTH in 1 month 3/11/20  · HTN: managed on Norvasc, Cozaar  · CHF: combined - managed on Bumex   ECHO showed an EF of 65%  · GERD: managed on Protonix (therapeutic sub for omeprazole)  · Hypothyroidism: managed on Synthroid  · HLD: managed on Lipitor  · CKD 3: baseline Cr = 1 1 - 1 3  · Follow-up with NSx, neurology, PMR, PCP, Nephro   · Disposition:  Discharge Saturday with OP OT, ST and 24-7 supervision        Patient participated in a comprehensive interdisciplinary inpatient rehabilitation program which included involvment of MD, therapies (PT, OT, and SLP), RN, CM/SW, dietary, and psychology services  She made significant functional gains and was able to be advanced to a supervision level of assist and is considered adequately safe for discharge home with family  Please see below for patient's hospital course and day to day management of medical needs in problem list format  Functional status on admission to ARC:  Transfers Min assist, lower body dressing Min assist, upper body dressing Min assist, bathing moderate assist, toileting Min assist ambulation 50 ft    Functional status on discharge from [facility]:  Bathing incidental touching otherwise largely supervision for ADLs and mobility 150 ft        - See AVS (After visit summary) with discharge instructions, discharge medications, and other details  Imaging (See above as well):  No results found      Pertinent Recent Labs (See Course above, EPIC EMR, and if needed request additional records):  Results for Vivian Maynard (MRN [id number]) as of 2/28/2020 12:48   Ref  Range 2/27/2020 06:42   Sodium Latest Ref Range: 136 - 145 mmol/L 139   Potassium Latest Ref Range: 3 5 - 5 3 mmol/L 4 0   Chloride Latest Ref Range: 100 - 108 mmol/L 103   CO2 Latest Ref Range: 21 - 32 mmol/L 30   Anion Gap Latest Ref Range: 4 - 13 mmol/L 6   BUN Latest Ref Range: 5 - 25 mg/dL 24   Creatinine Latest Ref Range: 0 60 - 1 30 mg/dL 1 09   Glucose, Random Latest Ref Range: 65 - 140 mg/dL 105   Calcium Latest Ref Range: 8 3 - 10 1 mg/dL 9 1   eGFR Latest Units: ml/min/1 73sq m 48   WBC Latest Ref Range: 4 31 - 10 16 Thousand/uL 6 54   Red Blood Cell Count Latest Ref Range: 3 81 - 5 12 Million/uL 3 96   Hemoglobin Latest Ref Range: 11 5 - 15 4 g/dL 11 0 (L)   HCT Latest Ref Range: 34 8 - 46 1 % 34 7 (L)   MCV Latest Ref Range: 82 - 98 fL 88   MCH Latest Ref Range: 26 8 - 34 3 pg 27 8   MCHC Latest Ref Range: 31 4 - 37 4 g/dL 31 7   RDW Latest Ref Range: 11 6 - 15 1 % 16 3 (H)   Platelet Count Latest Ref Range: 149 - 390 Thousands/uL 178       Procedures Performed During ARC Admission: None    HPI:   "Mary Ham is a [de-identified] y o  female with HTN, CHF, hypothyroidism, CKD 3, HLD who presented to the [facility] on 2/10/20 with mental status changes, vomiting, and headache  She was found to have a 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  She required a craniectomy and evacuation performed by Dr Severiano Pesa on 2/11/20  Post-operatively patient completed 7 days of Keppra for seizure ppx  Maintained in a cranial helmet while OOB  Cleared for SQ Heparin for DVT ppx  Further work-up revealed no mass, ischemia or critical stenosis  It was recommended to repeat CTH in 1 month  Patient found to have functional deficits from her baseline    Admitted to [address] [place] [address] on 2/18/20    " - Per Dr Alexander Ocampo at Discharge: good     Discharge instructions/Information to patient and family:   See after visit summary for information provided to patient and family  Provisions for Follow-Up Care:  See after visit summary for information related to follow-up care and any pertinent home health orders  Disposition: Home with 24-7 family     Planned Readmission:  No    Discharge Statement   Total time spent examining patient, counseling patient (or patient/family) on condition, medication, rehabilitation/medical plan, and coordinating care on day of discharge: at least 45 minutes  Greater than 50% of the total time was spent examining patient, answering all questions, directly discussing plan of care and post-discharge instructions with patient (or patient and family)  Additional time spent on coordinating care and other discharge activities  Discharge Medications:  See after visit summary for reconciled discharge medications provided to patient and family

## 2020-02-28 NOTE — PROGRESS NOTES
02/28/20 1300   Clinical Encounter Type   Routine Visit Follow-up   Hoahaoism Encounters   Hoahaoism Needs Prayer  (priest larry)

## 2020-02-28 NOTE — PROGRESS NOTES
Physical Medicine & Rehabilitation Progress Note  Cecily Jason [de-identified] y o  female MRN: [id number] Unit/Bed#:   is seen and examined with the management of the following issues:    Interval events:  Reviewed in chart and discussion with staff and patient  No significant unexpected medical or rehabilitation events since last encounter  Assessment & Plan:  Vu Wise is a [de-identified] y o  female with HTN, CHF, hypothyroidism, CKD 3, HLD who presented to the [facility] [employer] [facility] 2/10/20 with mental status changes, vomiting, and headache   She was found to have a 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   She required a craniectomy and evacuation performed by Dr Aneta Ko on 2/11/20   Post-operatively patient completed 7 days of Keppra for seizure ppx  Maintained in a cranial helmet while OOB   Cleared for SQ Heparin for DVT ppx  Further work-up revealed no mass, ischemia or critical stenosis   It was recommended to repeat CTH in 1 month   Patient found to have functional deficits from her baseline   Admitted to [facility] on 2/18/20         Plan:   · Rehabilitation plan:  Functional deficits:  Cognitive deficits, generalized weakness  [address] [place] [address], Ne = 14/30  Patient will likely require 24/7 care at home upon discharge  Continue current PT/OT/SLP plan  · Functional update - bathing incidental touching, dressing supervision, ambulation supervision  · 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 by Dr Aneta Ko on 2/11/20  Cranial precautions   +Helmet while OOB   F/U CTH in 1 month 3/11/20   NSGY removed staples   · HTN: managed on Norvasc, Cozaar  · CHF: combined - managed on Bumex   ECHO showed an EF of 65%  · GERD: managed on Protonix (therapeutic sub for omeprazole)  · Hypothyroidism: managed on Synthroid  · HLD: managed on Lipitor  · CKD 3: baseline Cr = 1 1 - 1 3  · DVT ppx: managed on SQ Heparin 5000 units Q8h and SCDs  · Appreciate internal medicine consultants medical comanagement  · Disposition:  Discharge Saturday    The above assessment and plan was reviewed and updated as determined by my evaluation of the patient on 02/27/20  Chief Complaints:  Stroke rehab follow-up    Subjective:     Patient reports no new issues  She denies worsening strength, sensation, vision, bowel or bladder problems, calf pain, or other complaints  ROS: A 10 point ROS was performed; negative except as noted above      ---------------------------------------------------------------------------------------------------------------------    Objective: Allergies and Medications per EMR    Physical Exam:  Temp:  [98 °F (36 7 °C)-98 3 °F (36 8 °C)] 98 °F (36 7 °C)  HR:  [78-90] 90  Resp:  [18] 18  BP: (112-142)/(55-70) 142/70  GEN:  Sitting in NAD  and With helmet on  HEENT/NECK: Moist mucous membranes  CARDIAC: Regular rate rhythm, no murmers, no rubs, no gallops  LUNGS:  clear to auscultation, no wheezes, rales, or rhonchi  ABDOMEN: Soft, non-tender, non-distended, normal active bowel sounds  EXTREMITIES/SKIN:  no calf edema, no calf tenderness to palpation  NEURO:   MENTAL STATUS:  Appropriate wakefulness and interaction   PSYCH:  Affect:  Euthymic     Physical exam performed, documentation above reviewed, and updated if and when appropriate on date of encounter listed below:   02/27/20    Diagnostic Studies: reviewed, no new imaging  No results found      Laboratory:    Results from last 7 days   Lab Units 02/27/20  0642 02/24/20  0846   HEMOGLOBIN g/dL 11 0* 10 6*   HEMATOCRIT % 34 7* 34 1*   WBC Thousand/uL 6 54 12 40*     Results from last 7 days   Lab Units 02/27/20  0642 02/24/20  0651   BUN mg/dL 24 20   POTASSIUM mmol/L 4 0 4 3   CHLORIDE mmol/L 103 101   CREATININE mg/dL 1 09 1 03            Patient Active Problem List   Diagnosis    Morbid obesity due to excess calories (HCC)    Benign essential hypertension    Chronic diastolic (congestive) heart failure ([facility] )    Hypothyroidism    Acute on chronic diastolic congestive heart failure (HCC)    Arthropathy of knee    Hallux abductovalgus with bunions, unspecified laterality    Atherosclerosis of arteries of extremities (HCC)    Chronic low back pain    Chronic venous insufficiency    CKD [facility]chronic [facility] disease), stage III ([facility])    Difficulty in walking    Diverticulitis of colon    Elevated triglycerides with high cholesterol    Chronic gout without tophus    Hiatal hernia    Hyperlipemia    Hyperuricemia    Knee pain    Lumbar disc herniation with radiculopathy    Morbid obesity with body mass index of 40 0-44 9 in adult ([facility])    Nephrolithiasis    Onychomycosis    WENDI (obstructive sleep apnea)    Umbilical hernia    Tarsal tunnel syndrome    Rupture of popliteal cyst    Pseudogout of left wrist    Plantar fascial fibromatosis    Anxiety    Alkaline phosphatase elevation    Pain in both feet    Gastroesophageal reflux disease without esophagitis    Iron deficiency anemia secondary to inadequate dietary iron intake    Radiculopathy of lumbar region    Corns    SOB (shortness of breath)    Eczema    Osteoporosis screening    Peripheral arteriosclerosis ([facility] )    Intraparenchymal hemorrhage of brain ([facility])       ** Please Note: Fluency Direct voice to text software may have been used in the creation of this document  **    Total time spent:  35 minutes, with more than 50% spent counseling/coordinating care  Counseling includes discussion with patient re: progress in therapies, functional issues observed by therapy staff, and discussion with patient his/her current medical state/wellbeing  Coordination of patient's care was performed in conjunction with Internal Medicine service to monitor patient's labs, vitals, and management of their comorbidities  In addition, this patient was discussed by the interdisciplinary team in weekly case conference today   The care of the patient was extensively discussed with all care providers and an appropriate rehabilitation plan was formulated unique for this patient  Barriers were identified preventing progression of therapy and appropriate interventions were discussed with each discipline  Please see the team note for input from all disciplines regarding barriers, intervention, and discharge planning        Weston Kidd MD, [facility]  Physical Medicine and Rehabilitation  Brain Injury Medicine

## 2020-02-28 NOTE — PROGRESS NOTES
02/28/20 0930   Pain Assessment   Pain Assessment No/denies pain   Pain Score No Pain   Restrictions/Precautions   Precautions Bed/chair alarms; Fall Risk;Cognitive;Supervision on toilet/commode   Weight Bearing Restrictions No   ROM Restrictions No   Braces or Orthoses Craniectomy Helmet   Lifestyle   Autonomy "I can't wait to go home tomorrow "   Eating   Type of Assistance Needed Independent   Amount of Physical Assistance Provided No physical assistance   Eating CARE Score 6   Oral Hygiene   Type of Assistance Needed Supervision   Amount of Physical Assistance Provided No physical assistance   Comment in stance at sink   Oral Hygiene CARE Score 4   Grooming   Able To Initiate Tasks; Acquire Items;Comb/Brush Hair;Wash/Dry Face;Brush/Clean Teeth;Wash/Dry Hands   Findings S while in stance at sink   Shower/Bathe Self   Type of Assistance Needed Supervision;Verbal cues   Amount of Physical Assistance Provided No physical assistance   Comment CS while in stance w/ use of grab bars to bathe jimmie and buttocks  S while seated to bathe UB and lower legs w/ LH sponge  Cues for sequencing to wash hair and incision  Shower/Bathe Self CARE Score 4   Tub/Shower Transfer   Limitations Noted In Balance; Safety;Problem Solving   Adaptive Equipment Grab Bars;Seat with Back   Assessed Tub/shower combo   Findings CGA w/ use of grab bars  DIL stated pt may shower in walk-in shower w/ plan to install grab into tub/shower in upcoming weeks  Add'lly reporting plan to hire help w/ ADLs to dec caregiver burden     Upper Body Dressing   Type of Assistance Needed Supervision;Set-up / clean-up   Amount of Physical Assistance Provided No physical assistance   Comment seated   Upper Body Dressing CARE Score 4   Lower Body Dressing   Type of Assistance Needed Supervision;Set-up / clean-up   Amount of Physical Assistance Provided No physical assistance   Lower Body Dressing CARE Score 4   Putting On/Taking Off Footwear   Type of Assistance Needed Supervision   Amount of Physical Assistance Provided No physical assistance   Comment seated on lower surface to don/doff socks   Putting On/Taking Off Footwear CARE Score 4   Sit to Stand   Type of Assistance Needed Supervision   Amount of Physical Assistance Provided No physical assistance   Sit to Stand CARE Score 4   Bed-Chair Transfer   Type of Assistance Needed Supervision; Adaptive equipment   Amount of Physical Assistance Provided No physical assistance   Comment w/ RW   Chair/Bed-to-Chair Transfer CARE Score 4   Toileting Hygiene   Type of Assistance Needed Supervision   Amount of Physical Assistance Provided No physical assistance   Toileting Hygiene CARE Score 4   Toilet Transfer   Type of Assistance Needed Supervision   Amount of Physical Assistance Provided No physical assistance   Toilet Transfer CARE Score 4   Assessment   Treatment Assessment Pt seen for skilled OT w/ focus on self-care management and family training  Details on ADL noted above, cont to req vc's to inc safety and problem solving and completes at overall S level w/ exception of shower xfer  Family demo G understanding of pt's cogitive deficits and importance of remaining fall free w/ 24 hour supervision at d/c  Pt report having bed and chair alarms set up for pt and agreeable to assist w/ all IADL management  Refer below for inc detail on FT  From OT standpoint, pt okay to d/c home w/ family support and 24 hour S  Pt was left resting in recliner w/ all needs within reach and chair alarm activated  OT Family training done with: Roxana Gurrola, son Audra Canoe, [place]   Assessment of family training Reviewed ADL routine and incision care  Edu on need of 24 hour supervision, need for crani helmet when OOB, and importance to remain fall free  Offered recommendations to ease caregiver burden at home and edu on pt's current LOF   Family aware of pt's cognitive deficits including impaired insight, poor problem solving, impaired safety awareness, impaired STM, and poor attention to task  Family aware of need for A w/ all ADLs including cooking, finance management, and med management  Family very supportive and demo G understanding of all edu provided  Provided w/ business card to reach out w/ further questions following d/c home  Prognosis Fair   Problem List Decreased endurance; Impaired balance;Decreased mobility;Orthopedic restrictions;Pain   Recommendation   OT Discharge Recommendation 24 hour supervision/assist  (OP OT)   OT Therapy Minutes   OT Time In 0930   OT Time Out 1030   OT Total Time (minutes) 60   OT Mode of treatment - Individual (minutes) 60   OT Mode of treatment - Concurrent (minutes) 0   OT Mode of treatment - Group (minutes) 0   OT Mode of treatment - Co-treat (minutes) 0   OT Mode of Treatment - Total time(minutes) 60 minutes   OT Cumulative Minutes 750   Therapy Time missed   Time missed?  No

## 2020-02-28 NOTE — PROGRESS NOTES
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   She required a craniectomy and evacuation performed by Dr Patt Ochoa on 2/11/20   Post-operatively patient completed 7 days of Keppra for seizure ppx  Maintained in a cranial helmet while OOB   Cleared for SQ Heparin for DVT ppx  Further work-up revealed no mass, ischemia or critical stenosis   It was recommended to repeat CTH in 1 month   Patient found to have functional deficits from her baseline   Admitted to [facility] on 2/18/20         Plan:   · Rehabilitation plan:  Functional deficits:  Cognitive deficits, generalized weakness  [address] [place] [address], Ne = 14/30  Patient will likely require 24/7 care at home upon discharge  completing skilled therapies; family training completing  · Functional update - bathing incidental touching, dressing supervision, ambulation supervision  · 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 by Dr Patt Ochoa on 2/11/20  Cranial precautions   +Helmet while OOB   F/U CTH in 1 month 3/11/20   NSGY removed staples   · HTN: managed on Norvasc, Cozaar  · CHF: combined - managed on Bumex   ECHO showed an EF of 65%  · GERD: managed on Protonix (therapeutic sub for omeprazole)  · Hypothyroidism: managed on Synthroid  · HLD: managed on Lipitor  · CKD 3: baseline Cr = 1 1 - 1 3  · DVT ppx: managed on SQ Heparin 5000 units Q8h and SCDs  · Appreciate internal medicine consultants medical comanagement  · Disposition:  Discharge Saturday    The above assessment and plan was reviewed and updated as determined by my evaluation of the patient on 02/28/20  Chief Complaints:  Stroke rehab follow-up    Subjective:     Patient reports feeling really good  She denies headache, worsening strength or sensation or balance  Patient denies fever, chills, sweats, calf pain, or other new complaints  ROS: A 10 point ROS was performed; negative except as noted above      ---------------------------------------------------------------------------------------------------------------------    Objective: Allergies and Medications per EMR    Physical Exam:  Temp:  [98 °F (36 7 °C)-98 2 °F (36 8 °C)] 98 2 °F (36 8 °C)  HR:  [74-90] 74  Resp:  [18] 18  BP: (120-165)/(60-80) 127/60  GEN:  Sitting in NAD  and With helmet on  HEENT/NECK: Moist mucous membranes, cranial incision healing appropriately without signs of infection or significant dehiscence some scabbing stable  CARDIAC: Regular rate rhythm, no murmers, no rubs, no gallops  LUNGS:  clear to auscultation, no wheezes, rales, or rhonchi  ABDOMEN: Soft, non-tender, non-distended, normal active bowel sounds  EXTREMITIES/SKIN:  no calf edema, no calf tenderness to palpation  NEURO:   MENTAL STATUS:  Appropriate wakefulness and interaction   PSYCH:  Affect:  Euthymic     Physical exam performed, documentation above reviewed, and updated if and when appropriate on date of encounter listed below:   02/28/20    Diagnostic Studies: reviewed, no new imaging  No results found      Laboratory:    Results from last 7 days   Lab Units 02/27/20  0642 02/24/20  0846   HEMOGLOBIN g/dL 11 0* 10 6*   HEMATOCRIT % 34 7* 34 1*   WBC Thousand/uL 6 54 12 40*     Results from last 7 days   Lab Units 02/27/20  0642 02/24/20  0651   BUN mg/dL 24 20   POTASSIUM mmol/L 4 0 4 3   CHLORIDE mmol/L 103 101   CREATININE mg/dL 1 09 1 03            Patient Active Problem List   Diagnosis    Morbid obesity due to excess calories (HCC)    Benign essential hypertension    Chronic diastolic (congestive) heart failure (HCC)    Hypothyroidism    Acute on chronic diastolic congestive heart failure (HCC)    Arthropathy of knee    Hallux abductovalgus with bunions, unspecified laterality    Atherosclerosis of arteries of extremities (HCC)    Chronic low back pain    Chronic venous insufficiency    CKD (chronic kidney disease), stage III (HCC)    Difficulty in walking    Diverticulitis of colon    Elevated triglycerides with high cholesterol    Chronic gout without tophus    Hiatal hernia    Hyperlipemia    Hyperuricemia    Knee pain    Lumbar disc herniation with radiculopathy    Morbid obesity with body mass index of 40 0-44 9 in adult ([facility] [address] )    Nephrolithiasis    Onychomycosis    WENDI (obstructive sleep apnea)    Umbilical hernia    Tarsal tunnel syndrome    Rupture of popliteal cyst    Pseudogout of left wrist    Plantar fascial fibromatosis    Anxiety    Alkaline phosphatase elevation    Pain in both feet    Gastroesophageal reflux disease without esophagitis    Iron deficiency anemia secondary to inadequate dietary iron intake    Radiculopathy of lumbar region    Corns    SOB (shortness of breath)    Eczema    Osteoporosis screening    [facility]    Intraparenchymal hemorrhage of brain ([facility])       ** Please Note: Fluency Direct voice to text software may have been used in the creation of this document  **    Total time spent:  35 minutes, with more than 50% spent counseling/coordinating care  Counseling includes discussion with patient re: progress in therapies, functional issues observed by therapy staff, and discussion with patient his/her current medical state/wellbeing   Coordination of patient's care was performed in conjunction with Internal Medicine service to monitor patient's labs, vitals, and management of their comorbidities  Extended discussion held today with patient and patient's family regarding overall condition, medication/rehabilitation management planning, and disposition          Fox Forde MD, [facility] [place]  Physical Medicine and Rehabilitation  Brain Injury Medicine

## 2020-02-28 NOTE — PROGRESS NOTES
02/28/20 1100   Pain Assessment   Pain Assessment No/denies pain   Pain Score No Pain   Restrictions/Precautions   Precautions Bed/chair alarms;Cognitive; Fall Risk;Supervision on toilet/commode   Weight Bearing Restrictions No   ROM Restrictions No   Braces or Orthoses Craniectomy Helmet   Cognition   Overall Cognitive Status Impaired   Arousal/Participation Alert; Cooperative   Subjective   Subjective Pt presents in recliner with family present for FT session  agreeable to PT q   Sit to Stand   Type of Assistance Needed Supervision   Amount of Physical Assistance Provided No physical assistance   Sit to Stand CARE Score 4   Bed-Chair Transfer   Type of Assistance Needed Supervision; Adaptive equipment   Amount of Physical Assistance Provided Less than 25%   Comment RW   Chair/Bed-to-Chair Transfer CARE Score 3   Transfer Bed/Chair/Wheelchair   Limitations Noted In Balance; Endurance;LE Strength   Adaptive Equipment Roller Walker   Findings CS w RW   Car Transfer   Type of Assistance Needed Supervision; Adaptive equipment   Amount of Physical Assistance Provided No physical assistance   Car Transfer CARE Score 4   Walk 10 Feet   Type of Assistance Needed Supervision; Adaptive equipment   Amount of Physical Assistance Provided No physical assistance   Comment RW   Walk 10 Feet CARE Score 4   Walk 50 Feet with Two Turns   Type of Assistance Needed Supervision; Adaptive equipment   Amount of Physical Assistance Provided No physical assistance   Comment RW   Walk 50 Feet with Two Turns CARE Score 4   Walk 150 Feet   Type of Assistance Needed Supervision; Adaptive equipment   Amount of Physical Assistance Provided No physical assistance   Comment RW   Walk 150 Feet CARE Score 4   Walking 10 Feet on Uneven Surfaces   Type of Assistance Needed Supervision; Adaptive equipment   Amount of Physical Assistance Provided No physical assistance   Comment CS w RW   Walking 10 Feet on Uneven Surfaces CARE Score 4   Ambulation   Does the patient walk? 2  Yes   Primary Mode of Locomotion Prior to Admission Walk   Distance Walked (feet) 150 ft  (x2 )   Assist Device Roller Walker   Gait Pattern WNL   Limitations Noted In Endurance   Walk Assist Level Close Supervision   Findings CS w RW, discussed with family memebers recommendation for CS and use of RW at home  stressed t/o session multiple times importance of making sure pt uses RW and always wears helmet for safety with family demonstrating sound decision making and understanding of safety concerns    Wheel 50 Feet with Two Turns   Reason if not Attempted Activity not applicable   Wheel 50 Feet with Two Turns CARE Score 9   Wheel 150 Feet   Reason if not Attempted Activity not applicable   Wheel 363 Feet CARE Score 9   Wheelchair mobility   Does the patient use a wheelchair? 0  No   Curb or Single Stair   Style negotiated Curb   Type of Assistance Needed Adaptive equipment;Supervision   Amount of Physical Assistance Provided Less than 25%   Comment RW   1 Step (Curb) CARE Score 3   4 Steps   Type of Assistance Needed Supervision; Adaptive equipment   Amount of Physical Assistance Provided No physical assistance   Comment CS with BHR reciprocal pattern ascending and non-reciprocal pattern descending  4 Steps CARE Score 4   12 Steps   Reason if not Attempted Activity not applicable   12 Steps CARE Score 9   Stairs   Type Curb;Stairs; Ramp   # of Steps 4   Weight Bearing Precautions Fall Risk   Assist Devices Bilateral Rail   Findings CS overall on stairs with BHR  Also demonstrated use of RW up onto platform for 2 KUMAR if family takes pt in front door as reports stairs through garage with BHR are much higher that front steps  Also educated on HHA for front stairs to enter  had family demonstrate with proper guarding technique and repetitive demonstration for multiple stair negotiation techniques      Therapeutic Interventions   Other Education with pt and family about safety concerns, recomendations for 24/7 S for safety, importance of using RW and crani helmet and demonstration of body positioning for KUMAR    Assessment   Treatment Assessment Short session focus on FT with son, DIL and grandson  All demonstrate sound decision making skills, safety awareness, awareness into pt's deficits and understadning of PT recommendations for 24/7S  Pt is highly distractable and gets distracted during ambulation which compromises safety  Had family get hands on for guarding on stairs and taught them different menthods of stair negotiation as listed above  Overall pt set to d/c home tomorrow with 24/7S and OPPT for continued rehab with focus on inc balance, endurance, functional mobility and safety to maximize functional indpendence and dec caregiver burden  Family ok to transfer pt in room  Family/Caregiver Present yes    PT Family training done with: see assessment    PT Barriers   Physical Impairment Decreased strength;Decreased endurance; Impaired balance;Decreased mobility; Decreased coordination;Decreased cognition;Decreased safety awareness   Functional Limitation Car transfers; Ramp negotiation;Stair negotiation   Plan   Treatment/Interventions Functional transfer training;LE strengthening/ROM; Elevations; Therapeutic exercise; Endurance training;Cognitive reorientation;Patient/family training;Equipment eval/education; Bed mobility;Gait training   Progress Progressing toward goals   Recommendation   Recommendation 24 hour supervision/assist;Outpatient PT   Equipment Recommended Walker   PT Therapy Minutes   PT Time In 1100   PT Time Out 1130   PT Total Time (minutes) 30   PT Mode of treatment - Individual (minutes) 30   PT Mode of treatment - Concurrent (minutes) 0   PT Mode of treatment - Group (minutes) 0   PT Mode of treatment - Co-treat (minutes) 0   PT Mode of Treatment - Total time(minutes) 30 minutes   PT Cumulative Minutes 630   Therapy Time missed   Time missed?  No

## 2020-02-29 VITALS
TEMPERATURE: 97.4 F | BODY MASS INDEX: 38.34 KG/M2 | HEART RATE: 68 BPM | RESPIRATION RATE: 16 BRPM | DIASTOLIC BLOOD PRESSURE: 57 MMHG | SYSTOLIC BLOOD PRESSURE: 137 MMHG | OXYGEN SATURATION: 95 % | WEIGHT: 203.04 LBS | HEIGHT: 61 IN

## 2020-02-29 PROCEDURE — 99239 HOSP IP/OBS DSCHRG MGMT >30: CPT

## 2020-02-29 RX ADMIN — SENNOSIDES AND DOCUSATE SODIUM 1 TABLET: 8.6; 5 TABLET ORAL at 09:44

## 2020-02-29 RX ADMIN — LOSARTAN POTASSIUM 25 MG: 25 TABLET, FILM COATED ORAL at 09:43

## 2020-02-29 RX ADMIN — PANTOPRAZOLE SODIUM 40 MG: 40 TABLET, DELAYED RELEASE ORAL at 05:18

## 2020-02-29 RX ADMIN — BUMETANIDE 2 MG: 2 TABLET ORAL at 09:45

## 2020-02-29 RX ADMIN — LEVOTHYROXINE SODIUM 112 MCG: 112 TABLET ORAL at 05:18

## 2020-02-29 RX ADMIN — AMLODIPINE BESYLATE 10 MG: 10 TABLET ORAL at 09:44

## 2020-02-29 RX ADMIN — HEPARIN SODIUM 5000 UNITS: 5000 INJECTION INTRAVENOUS; SUBCUTANEOUS at 05:18

## 2020-02-29 NOTE — PLAN OF CARE
Problem: Potential for Falls  Goal: Patient will remain free of falls  Description  INTERVENTIONS:  - Assess patient frequently for physical needs  -  Identify cognitive and physical deficits and behaviors that affect risk of falls    -  [place] fall precautions as indicated by assessment   - Educate patient/family on patient safety including physical limitations  - Instruct patient to call for assistance with activity based on assessment  - Modify environment to reduce risk of injury  - Consider OT/PT consult to assist with strengthening/mobility  Outcome: Progressing     Problem: PAIN - ADULT  Goal: Verbalizes/displays adequate comfort level or baseline comfort level  Description  Interventions:  - Encourage patient to monitor pain and request assistance  - Assess pain using appropriate pain scale  - Administer analgesics based on type and severity of pain and evaluate response  - Implement non-pharmacological measures as appropriate and evaluate response  - Consider cultural and social influences on pain and pain management  - Notify physician/advanced practitioner if interventions unsuccessful or patient reports new pain  Outcome: Progressing     Problem: INFECTION - ADULT  Goal: Absence or prevention of progression during hospitalization  Description  INTERVENTIONS:  - Assess and monitor for signs and symptoms of infection  - Monitor lab/diagnostic results  - Monitor all insertion sites, i e  indwelling lines, tubes, and drains  - Monitor endotracheal if appropriate and nasal secretions for changes in amount and color  - [place] appropriate cooling/warming therapies per order  - Administer medications as ordered  - Instruct and encourage patient and family to use good hand hygiene technique  - Identify and instruct in appropriate isolation precautions for identified infection/condition  Outcome: Progressing  Goal: Absence of fever/infection during neutropenic period  Description  INTERVENTIONS:  - Monitor WBC    Outcome: Progressing     Problem: SAFETY ADULT  Goal: Maintain or return to baseline ADL function  Description  INTERVENTIONS:  -  Assess patient's ability to carry out ADLs; assess patient's baseline for ADL function and identify physical deficits which impact ability to perform ADLs (bathing, care of mouth/teeth, toileting, grooming, dressing, etc )  - Assess/evaluate cause of self-care deficits   - Assess range of motion  - Assess patient's mobility; develop plan if impaired  - Assess patient's need for assistive devices and provide as appropriate  - Encourage maximum independence but intervene and supervise when necessary  - Involve family in performance of ADLs  - Assess for home care needs following discharge   - Consider OT consult to assist with ADL evaluation and planning for discharge  - Provide patient education as appropriate  Outcome: Progressing  Goal: Maintain or return mobility status to optimal level  Description  INTERVENTIONS:  - Assess patient's baseline mobility status (ambulation, transfers, stairs, etc )    - Identify cognitive and physical deficits and behaviors that affect mobility  - Identify mobility aids required to assist with transfers and/or ambulation (gait belt, sit-to-stand, lift, walker, cane, etc )  - [place] fall precautions as indicated by assessment  - Record patient progress and toleration of activity level on Mobility SBAR; progress patient to next Phase/Stage  - Instruct patient to call for assistance with activity based on assessment  - Consider rehabilitation consult to assist with strengthening/weightbearing, etc   Outcome: Progressing     Problem: DISCHARGE PLANNING  Goal: Discharge to home or other facility with appropriate resources  Description  INTERVENTIONS:  - Identify barriers to discharge w/patient and caregiver  - Arrange for needed discharge resources and transportation as appropriate  - Identify discharge learning needs (meds, wound care, etc )  - Arrange for interpretive services to assist at discharge as needed  - Refer to Case Management Department for coordinating discharge planning if the patient needs post-hospital services based on physician/advanced practitioner order or complex needs related to functional status, cognitive ability, or social support system  Outcome: Progressing     Problem: Prexisting or High Potential for Compromised Skin Integrity  Goal: Skin integrity is maintained or improved  Description  INTERVENTIONS:  - Identify patients at risk for skin breakdown  - Assess and monitor skin integrity  - Assess and monitor nutrition and hydration status  - Monitor labs   - Assess for incontinence   - Turn and reposition patient  - Assist with mobility/ambulation  - Relieve pressure over bony prominences  - Avoid friction and shearing  - Provide appropriate hygiene as needed including keeping skin clean and dry  - Evaluate need for skin moisturizer/barrier cream  - Collaborate with interdisciplinary team   - Patient/family teaching  - Consider wound care consult   Outcome: Progressing     Problem: Nutrition/Hydration-ADULT  Goal: Nutrient/Hydration intake appropriate for improving, restoring or maintaining nutritional needs  Description  Monitor and assess patient's nutrition/hydration status for malnutrition  Collaborate with interdisciplinary team and initiate plan and interventions as ordered  Monitor patient's weight and dietary intake as ordered or per policy  Utilize nutrition screening tool and intervene as necessary  Determine patient's food preferences and provide high-protein, high-caloric foods as appropriate       INTERVENTIONS:  - Monitor oral intake, urinary output, labs, and treatment plans  - Assess nutrition and hydration status and recommend course of action  - Evaluate amount of meals eaten  - Assist patient with eating if necessary   - Allow adequate time for meals  - Recommend/ encourage appropriate diets, oral nutritional supplements, and vitamin/mineral supplements  - Order, calculate, and assess calorie counts as needed  - Recommend, monitor, and adjust tube feedings and TPN/PPN based on assessed needs  - Assess need for intravenous fluids  - Provide specific nutrition/hydration education as appropriate  - Include patient/family/caregiver in decisions related to nutrition  Outcome: Progressing

## 2020-02-29 NOTE — PLAN OF CARE
Problem: INFECTION - ADULT  Goal: Absence or prevention of progression during hospitalization  Description  INTERVENTIONS:  - Assess and monitor for signs and symptoms of infection  - Monitor lab/diagnostic results  - Monitor all insertion sites, i e  indwelling lines, tubes, and drains  - Monitor endotracheal if appropriate and nasal secretions for changes in amount and color  - [place] appropriate cooling/warming therapies per order  - Administer medications as ordered  - Instruct and encourage patient and family to use good hand hygiene technique  - Identify and instruct in appropriate isolation precautions for identified infection/condition  Outcome: Adequate for Discharge

## 2020-02-29 NOTE — PROGRESS NOTES
Pt  being discharged home today with services  Pt  alert and oriented x 4  Pt  ambulates at supervision level with the use of RW  Pt  continent of bowel and bladder  No skin breakdowns noted at the day of discharge from [facility]  Pt  denies any pain at present

## 2020-02-29 NOTE — PROGRESS NOTES
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  · Keppra completed  · Helmet when out of bed  · SBP goal < 160      HTN  · Remains stable on Amlodipine/Losartan  Was on a higher dose of losartan as an outpt (25mg 2x daily)  · Continue to hold outpt propranolol 40mg every 12 hours 2/2 bradycardia  · No changes since well within goal of <696 systolic and no changes for home     CKD stage III; baseline 1 1-1 3  · stable  · Follows with Dr Margo Moss as an outpt      Chronic diastolic heart failure; LVEF 65%, mod-severe MR/mild-mod TR  · Continue Bumex 2mg daily  Was on a higher dose as an outpt (2mg daily except M/W/F take 2mg 2x daily)  · Stable     The above assessment and plan was reviewed and updated as determined by my evaluation of the patient on 2/29/2020      Labs:   Results from last 7 days   Lab Units 02/27/20  0642 02/24/20  0846   WBC Thousand/uL 6 54 12 40*   HEMOGLOBIN g/dL 11 0* 10 6*   HEMATOCRIT % 34 7* 34 1*   PLATELETS Thousands/uL 178 206     Results from last 7 days   Lab Units 02/27/20  0642 02/24/20  0651   SODIUM mmol/L 139 136   POTASSIUM mmol/L 4 0 4 3   CHLORIDE mmol/L 103 101   CO2 mmol/L 30 29   BUN mg/dL 24 20   CREATININE mg/dL 1 09 1 03   CALCIUM mg/dL 9 1 9 1                   Review of Scheduled Meds:    Current Facility-Administered Medications:  acetaminophen 650 mg Oral Q6H PRN Dean Wakefield MD   amLODIPine 10 mg Oral Daily Dean Wakefield MD   atorvastatin 40 mg Oral QPM Dean Wakefield MD   bisacodyl 10 mg Rectal Daily PRN Dean Wakefield MD   bumetanide 2 mg Oral Daily Dean Wakefield MD   heparin (porcine) 5,000 Units Subcutaneous Q8H [facility] Dean Wakefield MD   levothyroxine 112 mcg Oral Early Morning Dean Wakefield MD   losartan 25 mg Oral Daily Gracie Bae MD   melatonin 3 mg Oral HS Gracie Bae MD   ondansetron 4 mg Oral Q6H PRN Gracie Bae MD   oxyCODONE 2 5 mg Oral Q6H PRN Gracie Bae MD   pantoprazole 40 mg Oral Early Morning Gracie Bae MD   polyethylene glycol 17 g Oral Daily PRN Gracie Bae MD   senna-docusate sodium 1 tablet Oral BID Gracie Bae MD       Subjective/ HPI: Patients overnight issues or events were reviewed with nursing or staff during rounds or morning huddle session  No new or overnight issues  Offers no complaints  Pt anxious for dc today, slept well    OK from medicine standpoint for dc    ROS:   A 10 point ROS was performed; negative except as noted above         *Labs /Radiology studiesReviewed  *Medications Reviewed and reconciled as needed  *Please refer to order section for additional ordered labs studies  *Case discussed with primary attending during morning huddle case rounds    Physical Examination:  Vitals:   Vitals:    02/28/20 0601 02/28/20 1314 02/28/20 2117 02/29/20 0533   BP: 127/60 126/59 134/63 138/64   BP Location: Left arm Left arm Left arm Left arm   Pulse: 74 73 67 68   Resp: 18 18 18 16   Temp: 98 2 °F (36 8 °C) 98 8 °F (37 1 °C) 97 5 °F (36 4 °C) (!) 97 4 °F (36 3 °C)   TempSrc: Oral Oral Oral Oral   SpO2: 95% 98% 94% 95%   Weight:       Height:           General Appearance: no distress, conversive  HEENT: PERRLA, conjuctiva normal; oropharynx clear; mucous membranes moist;   Neck:  Supple, no lymphadenopathy or thyromegaly  Lungs: CTA, normal respiratory effort, no retractions, expiratory effort normal  CV: regular rate and rhythm , PMI normal   ABD: soft non tender, obese +BS x4  EXT: DP pulses intact, no lymphadenopathy, no edema  Skin: normal turgor, normal texture, no rash  Psych: affect normal, mood normal  Neuro: AAOx3; pleasant, helmet in place          The above physical exam was reviewed and updated as determined by my evaluation of the patient on 2/29/2020  Invasive Devices     None                    VTE Pharmacologic Prophylaxis: Heparin  Code Status: Level 3 - DNAR and DNI  Current Length of Stay: 11 day(s)      Total time spent:  30 minutes with more than 50% spent counseling/coordinating care  Counseling includes discussion with patient re: progress  and discussion with patient of his/her current medical state/information  Coordination of patient's care was performed in conjunction with primary service  Time invested included review of patient's labs, vitals, and management of their comorbidities with continued monitoring  In addition, this patient was discussed with medical team including physician and advanced extenders  The care of the patient was extensively discussed and appropriate treatment plan was formulated unique for this patient  ** Please Note:  voice to text software may have been used in the creation of this document   Although proof errors in transcription or interpretation are a potential of such software**

## 2020-03-02 ENCOUNTER — TELEPHONE (OUTPATIENT)
Dept: NEUROLOGY | Facility: CLINIC | Age: 80
End: 2020-03-02

## 2020-03-02 ENCOUNTER — TRANSITIONAL CARE MANAGEMENT (OUTPATIENT)
Dept: FAMILY MEDICINE CLINIC | Facility: CLINIC | Age: 80
End: 2020-03-02

## 2020-03-02 ENCOUNTER — TELEPHONE (OUTPATIENT)
Dept: FAMILY MEDICINE CLINIC | Facility: CLINIC | Age: 80
End: 2020-03-02

## 2020-03-02 ENCOUNTER — EVALUATION (OUTPATIENT)
Dept: OCCUPATIONAL THERAPY | Facility: CLINIC | Age: 80
End: 2020-03-02
Payer: MEDICARE

## 2020-03-02 DIAGNOSIS — I61.9 INTRAPARENCHYMAL HEMORRHAGE OF BRAIN (HCC): ICD-10-CM

## 2020-03-02 PROCEDURE — 97167 OT EVAL HIGH COMPLEX 60 MIN: CPT

## 2020-03-02 NOTE — PROGRESS NOTES
OT Evaluation     Today's date: 3/2/2020  Patient name: Yary Edmondson  : 1940  MRN: [id number]  Referring provider: Sandi Fry MD  Dx:   Encounter Diagnosis     ICD-10-CM    1  Intraparenchymal hemorrhage of brain (HCC) I61 9 Ambulatory referral to Occupational Therapy                  Assessment  Assessment details: Occupational Therapy Skilled Analysis Assessment and Plan of Care:  Pt requires overall S for ADLs/self care and S for fx'l mobility w/ DME  Pt is currently demonstrating the following occupational deficits: limited 2* decreased sustained, divided attention, fine motor coordination, and dual tasking/balance  Based on the aforementioned OT evaluation, functional performance deficits, and assessments, pt has been identified as a high complexity evaluation  Pt to continue to benefit from outpatient skilled OT services to address the following goals 2x/wk Short Term Goals for 4 weeks, Long Term Goals for 8 weeks with special focus on self-care management, pt education, attention and ability to dual task  UE coordination as well as motor training to improve above defiicits and enhance overall QOL/function      Impairments: abnormal coordination, abnormal gait, abnormal muscle firing, abnormal muscle tone, abnormal or restricted ROM, abnormal movement, activity intolerance, difficulty understanding, impaired balance, impaired physical strength, lacks appropriate home exercise program, safety issue, scapular dyskinesis and poor posture   Understanding of Dx/Px/POC: good   Prognosis: good    Plan  Patient would benefit from: OT eval and skilled occupational therapy  Planned modality interventions: manual electrical stimulation  Planned therapy interventions: abdominal trunk stabilization, IADL retraining, activity modification, ADL retraining, ADL training, joint mobilization, manual therapy, Ng taping, balance, motor coordination training, balance/weight bearing training, muscle pump exercises, neuromuscular re-education, body mechanics training, behavior modification, breathing training, orthotic management and training, orthotic fitting/training, patient education, postural training, cognitive skills, community reintegration, coordination, sensory integrative techniques, self care, strengthening, stretching, therapeutic activities, therapeutic exercise, therapeutic training, transfer training, flexibility, fine motor coordination training, functional ROM exercises, graded activity, graded exercise, graded motor, home exercise program and work reintegration  Plan of Care beginning date: 3/2/2020  Plan of Care expiration date: 2020  Treatment plan discussed with: patient and family        Subjective Evaluation    Pain  Current pain ratin  At best pain ratin  At worst pain ratin    Patient Goals  Patient goals for therapy: independence with ADLs/IADLs  Patient goal: "to be able to use the bathroom on my own"       Pt requires Supervision for ADLs and pt lives with son and grand son in a house with 2 KUMAR  Pt previously cooking and cleaning  Pt enjoys cooking and owns RW  Objective    9 HOLE PEG TEST: performed 9 hole peg test to assess dexterity/fine motor coordination with pt scoring 38 22  seconds on R hand (affected) and 42 64 seconds on L hand (unaffected) side   Pt demonstrating decreased [address] related to age-related norms (age 21 42 seconds)       Criders Cognitive Assessment Version 8 1 (MoCA V8 1)  Visuospatial/executive functionin/5  Namin/3  Memory: 1st trial:  3/5, 2nd trial:    Attention/concentration: 0/2  List of letters: 1/1  Serial Seven Subtraction:  3   Language/sentence repetition:  1/2  Language Fluency:  0/1  Abstract/Correlational Thinkin/  Delayed Recall:  3/5  Orientation:     Memory Index Score: 12/15  MoCA V1 8 1 Raw Score:  17/30, pt's highest level of education is 8th grade in [employer] MIS:  12/15, indicative of mild/moderate neurocognitive impairments  Impairments Section:  UE Strength:              DEMETRIS: RUE: 42/200 LUE: 45/200  The age norm is approximately 42 6 lbs and indicating decreased  strength              2 point pinch: RUE: 5 LUE: 7        Range of Motion:  AROM:    B UE within normal limits     MMT:  R UE: 4/5 in all pivots   L UE 4/5 in all pivots except shoulder flexion 3+/5     Pt is R hand dominant     Requesting PT evaluation and sent MD Symone willson in [employer]  GOALS:   Short Term Goals:  Pt will increase attention to 1+ tasks with only 2 VCs for redirection for improved role performance (once returned) and engagement in salient tasks as applicable  Pt will increase verbal and written direction following with processing time of <2 min and 50% accuracy for improved role performance, once returned as applicable  Pt will increase rate of manipulation for all FM tests scoring 35 seconds on 9 hole peg test for improved pad pinch 4 weeks  Pt will demo with G carryover of Home Exercise Program to improve functional progression towards goals in Plan of care and for improved functional use of RUE 4 weeks      Long Term Goals:  Pt will increase attention to 2+ tasks with only 2 VCs for redirection for improved role performance (once returned) and engagement in salient tasks as applicable  Pt will complete ADL routine with modified independence and S for bathing only     Pt will increase verbal and written direction following with processing time of <2 min and 75% accuracy for improved role performance, once returned as applicable  Pt will increase rate of manipulation for all FM tests scoring 33 seconds on 9 hole peg test for improved pad pinch 4 weeks      assisted pt with scheduling;        Precautions: FALL SAFETY, Helmet

## 2020-03-02 NOTE — SPEECH THERAPY NOTE
Pt's son Refugio Ferris and daughter in law were present, requesting to speak outside of pt's room  SLP educated from at 12:45-1:30 with OT with family from 17:27-2:12  SLP and OT educated family on pt's cognitive deficits (decreased attention, working memory, [employer] recall, safety, insight, judgement/reasoning), as well as review of standardized assessments  Family was educated on pt's poor insight and awareness of deficits with recommendations for 24/7 direct supervision and bed/chair alarms  Family was provided with example for how to order online, as well as educated them on recommendation for video baby monitor  SLP provided examples of pt's deficits in relation to functional tasks which impact safety  Also educated on recommendations for family to assist w/ all IADL tasks  Family is receptive, however they report that this is very overwhelming for them and they do not want to put this burden entirely on their grandson as he is interviewing for jobs  They report CM provided list of home health aides, but they would also like list of adult day programs  Pt's daughter in law is also going to contact her human resources about possible FMLA  Planned for family training to occur on 2/28/2020 across all disciplines

## 2020-03-02 NOTE — LETTER
March 3, 2020    Beni Cheek, [address] [facility]    Patient: Sebastian Schwarz   YOB: 1940   Date of Visit: 3/2/2020     Encounter Diagnosis     ICD-10-CM    1  Intraparenchymal hemorrhage of brain [facility]) I61 9 Ambulatory referral to Occupational Therapy       Dear Dr Surjit De Jesus: Thank you for your recent referral of Sebastian Schwarz  Please review the attached evaluation summary from [place]'s recent visit  Please verify that you agree with the plan of care by signing the attached order  If you have any questions or concerns, please do not hesitate to call  I sincerely appreciate the opportunity to share in the care of one of your patients and hope to have another opportunity to work with you in the near future  Sincerely,    Bakari Grimm, OT      Referring Provider:     I certify that I have read the below Plan of Care and certify the need for these services furnished under this plan of treatment while under my care  Beni Cheek MD  [address]        OT Evaluation     Today's date: 3/2/2020  Patient name: Sebastian Schwarz  : 1940  MRN: [id number]  Referring provider: Raghav Londono MD  Dx:   Encounter Diagnosis     ICD-10-CM    1  Intraparenchymal hemorrhage of brain (HCC) I61 9 Ambulatory referral to Occupational Therapy                  Assessment  Assessment details: Occupational Therapy Skilled Analysis Assessment and Plan of Care:  Pt requires overall S for ADLs/self care and S for fx'l mobility w/ DME  Pt is currently demonstrating the following occupational deficits: limited 2* decreased sustained, divided attention, fine motor coordination, and dual tasking/balance  Based on the aforementioned OT evaluation, functional performance deficits, and assessments, pt has been identified as a high complexity evaluation   Pt to continue to benefit from outpatient skilled OT services to address the following goals 2x/wk Short Term Goals for 4 weeks, Long Term Goals for 8 weeks with special focus on self-care management, pt education, attention and ability to dual task  UE coordination as well as motor training to improve above defiicits and enhance overall QOL/function      Impairments: abnormal coordination, abnormal gait, abnormal muscle firing, abnormal muscle tone, abnormal or restricted ROM, abnormal movement, activity intolerance, difficulty understanding, impaired balance, impaired physical strength, lacks appropriate home exercise program, safety issue, scapular dyskinesis and poor posture   Understanding of Dx/Px/POC: good   Prognosis: good    Plan  Patient would benefit from: OT eval and skilled occupational therapy  Planned modality interventions: manual electrical stimulation  Planned therapy interventions: abdominal trunk stabilization, IADL retraining, activity modification, ADL retraining, ADL training, joint mobilization, manual therapy, Ng taping, balance, motor coordination training, balance/weight bearing training, muscle pump exercises, neuromuscular re-education, body mechanics training, behavior modification, breathing training, orthotic management and training, orthotic fitting/training, patient education, postural training, cognitive skills, community reintegration, coordination, sensory integrative techniques, self care, strengthening, stretching, therapeutic activities, therapeutic exercise, therapeutic training, transfer training, flexibility, fine motor coordination training, functional ROM exercises, graded activity, graded exercise, graded motor, home exercise program and work reintegration  Plan of Care beginning date: 3/2/2020  Plan of Care expiration date: 2020  Treatment plan discussed with: patient and family        Subjective Evaluation    Pain  Current pain ratin  At best pain ratin  At worst pain ratin    Patient Goals  Patient goals for therapy: independence with ADLs/IADLs  Patient goal: "to be able to use the bathroom on my own"       Pt requires Supervision for ADLs and pt lives with son and grand son in a house with 2 KUMAR  Pt previously cooking and cleaning  Pt enjoys cooking and owns RW  Objective    9 HOLE PEG TEST: performed 9 hole peg test to assess dexterity/fine motor coordination with pt scoring 38 22  seconds on R hand (affected) and 42 64 seconds on L hand (unaffected) side  Pt demonstrating decreased [facility] related to age-related norms (age 21 42 seconds)       Rafiq Cognitive Assessment Version 8 1 (MoCA V8 1)  Visuospatial/executive functionin/5  Namin/3  Memory: 1st trial:  ***/5, 2nd trial:  ***/5  Attention/concentration: ***/2  List of letters: ***/1  Serial Seven Subtraction:  ***/3 w/ *** errors  Language/sentence repetition:  ***/2  Language Fluency:  ***/1  Abstract/Correlational Thinking:  ***/2  Delayed Recall:  3/5  Orientation:     Memory Index Score: ***/15  MoCA V1 8 1 Raw Score:  ***/30, MIS:  ***/15, indicative of *** neurocognitive impairments  Impairments Section:  UE Strength:              DEMETRIS: RUE: 42/200 LUE: 45/200  The age norm is approximately 42 6 lbs and indicating decreased  strength              2 point pinch: RUE:  5 LUE:  7        Range of Motion:  AROM:    B UE within normal limits     MMT:  R UE: 4/5 in all pivots   L UE 4/5 in all pivots  except shoulder flexion 3+/5     Pt is R hand dominant     Requesting PT evaluation and sent MD Debbie willson in [employer]         GOALS:   Short Term Goals:  Pt will increase attention to 1+ tasks with only 2 VCs for redirection for improved role performance (once returned) and engagement in salient tasks as applicable  Pt will increase verbal and written direction following with processing time of <2 min and 50% accuracy for improved role performance, once returned as applicable  Pt will increase rate of manipulation for all FM tests scoring 35 seconds on 9 hole peg test for improved pad pinch 4 weeks  Pt will demo with G carryover of Home Exercise Program to improve functional progression towards goals in Plan of care and for improved functional use of RUE 4 weeks      Long Term Goals:  Pt will increase attention to 2+ tasks with only 2 VCs for redirection for improved role performance (once returned) and engagement in salient tasks as applicable  Pt will complete ADL routine with modified independence and S for bathing only     Pt will increase verbal and written direction following with processing time of <2 min and 75% accuracy for improved role performance, once returned as applicable  Pt will increase rate of manipulation for all FM tests scoring 33 seconds on 9 hole peg test for improved pad pinch 4 weeks      assisted pt with scheduling;        Precautions: FALL SAFETY, Helmet

## 2020-03-02 NOTE — SPEECH THERAPY NOTE
[facility] Speech Therapy Discharge Summary    Pt discharged home on 2/29/2020 with supervision provided by family  Pt was being followed for cognitive linguistic therapy during acute rehab stay where pt was functioning at supervision level for expression and social interaction, min assist level for comprehension and moderate assist level for problem solving and memory  Pt completed the CLQT+ on initial evaluation with results correlating to overall MODERATE cognitive linguistic deficits at time of assessment and in comparison to age matched peers ranging from 65-79 y/o  Pt's most notable deficits were in attention, executive functions and memory  During therapy sessions, pt continues to present with deficits in attention (divided, sustained), STM recall, working memory, safety, problem solving/judgement and insight into current deficits  Despite continued education, pt remains with decreased awareness, insight and acceptance to understanding cognitive linguistic deficits since stroke, which in turn impacts safety awareness and judgement  Pt has also been provided with stroke education and pt's family has been educated on current recommendations for 24/hr supervision/assistance, current deficits and recommendations for outpatient skilled ST services  SLP additionally stressing to family in regards to supervising and monitoring finances as well as medications and all other IADLs at time of discharge due to changes in cognition  Lastly, SLP stressing to family about pt's overall verbosity, which can easily distract pt in any functional mobility task, which family aware to redirect immediately if noting tangential and verbose speech during tasks  Recommending continued skilled ST through outpatient services following discharge from acute rehab unit to further maximize functional cognitive linguistic skills to improve safety and level of independence

## 2020-03-02 NOTE — LETTER
March 3, 2020    Junior Varela, [address] [facility]    Patient: Louise Warner   YOB: 1940   Date of Visit: 3/2/2020     Encounter Diagnosis     ICD-10-CM    1  Intraparenchymal hemorrhage of brain [facility]) I61 9 Ambulatory referral to Occupational Therapy       Dear Dr Roxann Henson: Thank you for your recent referral of Louise Warner  Please review the attached evaluation summary from Rosalind's recent visit  Please verify that you agree with the plan of care by signing the attached order  If you have any questions or concerns, please do not hesitate to call  I sincerely appreciate the opportunity to share in the care of one of your patients and hope to have another opportunity to work with you in the near future  Sincerely,    Alison Banda OT      Referring Provider:     I certify that I have read the below Plan of Care and certify the need for these services furnished under this plan of treatment while under my care  Junior Varela MD  [address] [place] [address]        OT Evaluation     Today's date: 3/2/2020  Patient name: Louise Warner  : 1940  MRN: [id number]  Referring provider: Le Gordon MD  Dx:   Encounter Diagnosis     ICD-10-CM    1  Intraparenchymal hemorrhage of brain (HCC) I61 9 Ambulatory referral to Occupational Therapy                  Assessment  Assessment details: Occupational Therapy Skilled Analysis Assessment and Plan of Care:  Pt requires overall S for ADLs/self care and S for fx'l mobility w/ DME  Pt is currently demonstrating the following occupational deficits: limited 2* decreased sustained, divided attention, fine motor coordination, and dual tasking/balance  Based on the aforementioned OT evaluation, functional performance deficits, and assessments, pt has been identified as a high complexity evaluation   Pt to continue to benefit from outpatient skilled OT services to address the following goals 2x/wk Short Term Goals for 4 weeks, Long Term Goals for 8 weeks with special focus on self-care management, pt education, attention and ability to dual task  UE coordination as well as motor training to improve above defiicits and enhance overall QOL/function      Impairments: abnormal coordination, abnormal gait, abnormal muscle firing, abnormal muscle tone, abnormal or restricted ROM, abnormal movement, activity intolerance, difficulty understanding, impaired balance, impaired physical strength, lacks appropriate home exercise program, safety issue, scapular dyskinesis and poor posture   Understanding of Dx/Px/POC: good   Prognosis: good    Plan  Patient would benefit from: OT eval and skilled occupational therapy  Planned modality interventions: manual electrical stimulation  Planned therapy interventions: abdominal trunk stabilization, IADL retraining, activity modification, ADL retraining, ADL training, joint mobilization, manual therapy, Ng taping, balance, motor coordination training, balance/weight bearing training, muscle pump exercises, neuromuscular re-education, body mechanics training, behavior modification, breathing training, orthotic management and training, orthotic fitting/training, patient education, postural training, cognitive skills, community reintegration, coordination, sensory integrative techniques, self care, strengthening, stretching, therapeutic activities, therapeutic exercise, therapeutic training, transfer training, flexibility, fine motor coordination training, functional ROM exercises, graded activity, graded exercise, graded motor, home exercise program and work reintegration  Plan of Care beginning date: 3/2/2020  Plan of Care expiration date: 2020  Treatment plan discussed with: patient and family        Subjective Evaluation    Pain  Current pain ratin  At best pain ratin  At worst pain ratin    Patient Goals  Patient goals for therapy: independence with ADLs/IADLs  Patient goal: "to be able to use the bathroom on my own"       Pt requires Supervision for ADLs and pt lives with son and grand son in a house with 2 KUMAR  Pt previously cooking and cleaning  Pt enjoys cooking and owns RW  Objective    9 HOLE PEG TEST: performed 9 hole peg test to assess dexterity/fine motor coordination with pt scoring 38 22  seconds on R hand (affected) and 42 64 seconds on L hand (unaffected) side  Pt demonstrating decreased [address] related to age-related norms (age 21 42 seconds)       [place] Cognitive Assessment Version 8 1 (MoCA V8 1)  Visuospatial/executive functionin  Namin/3  Memory: 1st trial:  3/5, 2nd trial:  5  Attention/concentration: 0/2  List of letters:   Serial Seven Subtraction:  1/3   Language/sentence repetition:  12  Language Fluency:  0/1  Abstract/Correlational Thinkin/2  Delayed Recall:  3/5  Orientation:     Memory Index Score: 12/15  MoCA V1 8 1 Raw Score:  17/30, pt's highest level of education is 8th grade in [employer] MIS:  12/15, indicative of mild/moderate neurocognitive impairments  Impairments Section:  UE Strength:              DEMETRIS: RUE: 42/200 LUE: 45/200  The age norm is approximately 42 6 lbs and indicating decreased  strength              2 point pinch: RUE:  5 LUE:  7        Range of Motion:  AROM:    B UE within normal limits     MMT:  R UE: 4/5 in all pivots   L UE 4/5 in all pivots  except shoulder flexion 3+/5     Pt is R hand dominant     Requesting PT evaluation and sent MD Moises willson in [employer]         GOALS:   Short Term Goals:  Pt will increase attention to 1+ tasks with only 2 VCs for redirection for improved role performance (once returned) and engagement in salient tasks as applicable  Pt will increase verbal and written direction following with processing time of <2 min and 50% accuracy for improved role performance, once returned as applicable  Pt will increase rate of manipulation for all FM tests scoring 35 seconds on 9 hole peg test for improved pad pinch 4 weeks  Pt will demo with G carryover of Home Exercise Program to improve functional progression towards goals in Plan of care and for improved functional use of RUE 4 weeks      Long Term Goals:  Pt will increase attention to 2+ tasks with only 2 VCs for redirection for improved role performance (once returned) and engagement in salient tasks as applicable  Pt will complete ADL routine with modified independence and S for bathing only     Pt will increase verbal and written direction following with processing time of <2 min and 75% accuracy for improved role performance, once returned as applicable  Pt will increase rate of manipulation for all FM tests scoring 33 seconds on 9 hole peg test for improved pad pinch 4 weeks      assisted pt with scheduling;        Precautions: FALL SAFETY, Helmet

## 2020-03-03 ENCOUNTER — DOCUMENTATION (OUTPATIENT)
Dept: OTHER | Facility: HOSPITAL | Age: 80
End: 2020-03-03

## 2020-03-03 ENCOUNTER — TELEPHONE (OUTPATIENT)
Dept: NEUROSURGERY | Facility: CLINIC | Age: 80
End: 2020-03-03

## 2020-03-03 NOTE — PROGRESS NOTES
Speech-Language Pathology Initial Evaluation    Today's date: 3/3/2020  Patients name: Jim Sanchez  : 1940  MRN: [id number]  Safety measures: Intraparenchymal hemorrhage of brain  Referring provider: Serjio Bernstein MD    Primary Diagnosis/Billing code: Z83 3  Secondary Diagnosis/ Billing code: I61 9    Visit Tracking:  -Referring provider: Epic  -Billing guidelines: CMS  -Visit #1/10   -Insurance: Medicare  -RE due 2020    Subjective comments: "Nice to meet you " Patient's grandson, Swapna Savage, accompanied her to evaluation  How did the patient hear about us? Friends/family    Patient's goal(s): Patient does not feel as though she has any cognitive-linguistic deficits  However, her grandson reports that he feels as though time required to process verbal information has increased  He also notes difficulty retaining new information  Patient reports that she sometimes has difficulty focusing  Reason for referral: Change in cognitive status  Prior functional status: Communication effective and appropriate in all situations  Clinically complex situations: Discharge from SNF or Hospital in the last 30 days    History: Patient is a [de-identified] y o  female who was referred to outpatient skilled Speech Therapy services for a cognitive-linguistic evaluation  Patient is an [de-identified]year old female with a past medical history remarkable for HTN, CHF, hypothyroidism, CKD 3, HLD  On 2/10/20 patient presented to [address] [place] [address] with mental status changes, vomiting, and headache  She was found to have a 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   She required a craniectomy and evacuation performed by Dr Olga Hong on 20  She was found to have functional deficits from her baseline   Admitted to [facility] on 20 and discharged from [address] [place] [address] on 20   While there patient participated in [facility] Sabrina Estrella which targeted attention (sustained, divided), STM recall, working memory, safety, problem solving/judgement and insight into current deficits    At this time, patient is not driving  She lives with her son, his wife and their children  Her family helps with medication management and tracking appointments  Patient denies difficulty with swallowing  Her grandson reports that she is able to swallow pills, eat, and drink without coughing  Patient was [facility] and speaks both [place] and [facility]  She moved to the [address] [place] [address] several years ago and, conversationally, appears to be proficient in [place]  This evaluation was completed in [place]  Hearing: WFL  Vision: patient wears glasses    Home environment/lifestyle: Patient lives with her son, his wife, and their children  Highest level of education: Per OT, 8th grade  Vocational status: Unknown  Mental status: Alert  Behavior status: Cooperative  Communication modalities: Verbal  Rehabilitation prognosis: Good rehab potential to reach the established goals    Assessments    The Cognitive Linguistic Quick Test (CLQT) is designed to measure an individuals five cognitive domains (attention, memory, executive functions, language, and visuospatial skills)  This norm-referenced tool has been standardized on adults ages 25 through 80years old with neurological impairment as a result of CVA, TBI, or dementia  The following results were obtained during the administration of the assessment  Cognitive Domain: Score: Range of Severity:   Attention 92/215 Moderate   Memory 146/185 WNL   Executive Functions 10/40 Moderate   Language  28/37 WNL   Visuospatial Skills  43/105 Mild        *Composite Severity Rating: 3/4 0  Mild             Clock Drawin/13 Moderate     Pt scored below Criteron Cut Scores on the following subtests: Symbol Cancellation, Clock Drawing, Symbol Trails, Mazes and Design Generation  *Patient named 13 concrete category members (animals) in 60 sec (norm=15+)   -- BELOW AVERAGE    *Patient named 6 abstract category members (words beginning with letter 'm') in 60 sec (norm=10+)  -- BELOW AVERAGE During this task patient stated that she would be able to complete task if she could give answers in [facility]  Clinician allowed patient to give answers in both [place] and [facility]  Thus, her ability to name 6 abstract category members reflects that of a mixture in both [place] and [facility]  She was able to name 3 words in [place] and 3 words in [facility]  Of note, throughout evaluation tasks, patient frequently attempted to make conversation about items not related to tasks  For example, when completing Symbol Cancellation task, patient began telling a story about when she took her grandson to the doctor when he was a child  Clinician stated that patient was being time and that she should focus on evaluation task  This improved patient's attention, however, she continued to (less frequently) talk  Goals  Short-term goals:  Goal 1: To target mental manipulation and working memory, patient will participate in word finding activity (i e , anagrams) with 80% accuracy, to be achieved in 4-6 weeks  Goal 2: Patient will complete concrete and abstract categorization tasks to 80% accuracy to facilitate improved generative naming skills and working memory, to be achieved in 4-6 weeks  Goal 3: Patient will demonstrate functional problem solving skills and provide appropriate solutions to problems with 80% accuracy to facilitate increased safety and awareness in functional living environment, to be achieved in 4-6 weeks  Goal 4: Patient will facilitate planning by completing thought organization tasks (e g , sequencing, deduction puzzles, etc ) with 80% accuracy to facilitate increased executive functioning, working memory, problem solving, and processing skills, to be achieved in 4-6 weeks      Goal 5: Patient will complete auditory immediate and short term memory tasks to 80% accuracy to facilitate increased ability to retell narratives and recall information within functional living environment, to be achieved in 4-6 weeks  Goal 6: Patient will answer [facility]- questions regarding story read aloud with 80% accuracy to facilitate improved auditory comprehension and recall, to be achieved in 4-6 weeks  Long-term goals:  Goal 1: Patient will complete cognitive-linguistic therapy that addresses patients specific deficits in processing speed, short-term working memory, attention to detail, monitoring, sequencing, and organization skills, with instruction, to alleviate effects of executive functioning disorder deficits by discharge  Goal 2: Patient will demonstrate cognitive-communication skills consistent with age and education given use of compensatory strategies when needed to resume baseline activities and responsibilities in home, community, and work/school settings by discharge  Impressions/Recommendations    Impressions: Patient presents with a mild-moderate cognitive-linguistic deficit c/b decreased skills in attention, executive functions, and visuospatial skills  Patient does not demonstrate insight into deficits and feels as though her cognitive skills are commensurate to ability prior to intraparenchymal hemorrhage  Further, patient frequently attempted to engage clinician in off-topic conversation throughout evaluation and subjectively demonstrated difficulty sustaining attention to assessment tasks  However, this is being targeted in OT and will not be targeted by ST at this point  Her grandson notes that patient has some difficulty learning and retaining new information  As a result of family report and previous SLP targets from in-patient facility, memory will be a target in [address] sessions       Recommendations:  -Patient would benefit from outpatient skilled Speech Therapy services : Cognitive-Linguistic therapy    -Frequency: 2x weekly  -Duration: 3 months    -Intervention certification from: 5/0/8829  -Intervention certification to: 8/4/87    -Intervention comments: standardized test 10:15-11:15; scoring, interpretation, and POC development 1:00-2:00PM

## 2020-03-03 NOTE — PROGRESS NOTES
Outpatient OT noting some deficits in imbalance that may be best served with additional OP PT  I have entered ordered to add OP PT who will reach out to make appointment with pt/family

## 2020-03-03 NOTE — TELEPHONE ENCOUNTER
Spoke with Edwina Jovel to see how she is doing after surgery 2/11/2020 with hospital discharge date of 2/29/2020  He reports that she is doing well overall and denies any incisional issues or fevers  Advised that at this point she should be showering and gently washing the surgical site with soap and water  Use clean wash cloth, towels, and clothing  Do not submerge in water until cleared by the surgeon  Do not apply any creams, ointments, or lotions to the site  Verified date/time/location of her upcoming POV on 3/24/2020 and advised her to call the office with any further questions or concerns, or if any incisional issues or fevers would arise  Patient was appreciative for the call

## 2020-03-04 NOTE — OCCUPATIONAL THERAPY NOTE
Occupational Therapy Discharge Summary    Pt has made progress during course of OT functioning at overall S for ADLs, for fxnl xfers and fxnl mobility w/ RW  Pt w/ all DME at time of d/c  The following assessment completed during [facility] stay: MOCA version 8 1 w/ pt scoring 14/30 indicating a moderate cognitive impairment  Due to ongoing cognitive impairments and impaired safety awareness, recommending 24 hour supervision and use of bed/chair alarms; family participated in edu and family training haydee ROSENTHAL understanding of pt's current LOF and agreeable to assist w/ IADL management  Reviewed incision care during ADLs and need for helmet at all times when OOB and outside of shower  From OT standpoint, pt is okay to return home w/ 24 hour S and family support for IADLs and transportation  Recommending OP OT to maximize fxnl indep, inc QOL, and dec caregiver burden  D/C IP OT      Linda Noguera MS, OTR/L

## 2020-03-05 ENCOUNTER — OFFICE VISIT (OUTPATIENT)
Dept: OCCUPATIONAL THERAPY | Facility: CLINIC | Age: 80
End: 2020-03-05
Payer: MEDICARE

## 2020-03-05 ENCOUNTER — EVALUATION (OUTPATIENT)
Dept: SPEECH THERAPY | Facility: CLINIC | Age: 80
End: 2020-03-05
Payer: MEDICARE

## 2020-03-05 DIAGNOSIS — I61.9 INTRAPARENCHYMAL HEMORRHAGE OF BRAIN (HCC): ICD-10-CM

## 2020-03-05 DIAGNOSIS — R48.8 OTHER SYMBOLIC DYSFUNCTIONS: Primary | ICD-10-CM

## 2020-03-05 DIAGNOSIS — I61.9 INTRAPARENCHYMAL HEMORRHAGE OF BRAIN (HCC): Primary | ICD-10-CM

## 2020-03-05 PROCEDURE — 96125 COGNITIVE TEST BY HC PRO: CPT

## 2020-03-05 PROCEDURE — 97530 THERAPEUTIC ACTIVITIES: CPT

## 2020-03-06 ENCOUNTER — TELEPHONE (OUTPATIENT)
Dept: NEUROLOGY | Facility: CLINIC | Age: 80
End: 2020-03-06

## 2020-03-06 ENCOUNTER — TRANSCRIBE ORDERS (OUTPATIENT)
Dept: ADMINISTRATIVE | Facility: HOSPITAL | Age: 80
End: 2020-03-06

## 2020-03-06 ENCOUNTER — HOSPITAL ENCOUNTER (OUTPATIENT)
Dept: RADIOLOGY | Facility: HOSPITAL | Age: 80
Discharge: HOME | End: 2020-03-06
Payer: MEDICARE

## 2020-03-06 DIAGNOSIS — I61.9 INTRAPARENCHYMAL HEMORRHAGE OF BRAIN (HCC): ICD-10-CM

## 2020-03-06 PROCEDURE — 70450 CT HEAD/BRAIN W/O DYE: CPT

## 2020-03-06 NOTE — TELEPHONE ENCOUNTER
Post CVA Discharge Follow Up    Hospitalization: 2/10-2/18, [facility] 2/18-2/28  The purpose of this phone call is to assess patient's general wellbeing or for any assistance needed with follow-up care  Called patient, reached son Maria Elena Mcgarry  He is indeed on communication consent form  Since discharge, he notes patient has not experienced any new or worsening stroke symptoms  States she is "so far, miraculously great!" Notes improving and walking more  Speech improved greatly  Notes some slight difficulty with multitasking and cueing  Patient ambulates walker, preforms her own ADLs, family assists as needed  Family organizes medications and manages appointments  Follow up with PCP 3/13  Stroke hospital follow up appointment scheduled 4/10 with Dr Susanna Rasmussen and residents  Outpatient PT, ST, OT, pend  I reviewed medications with him  There have not been any changes since discharge  No difficulties obtaining medications  Reports she is taking all as prescribed with no missed doses or medication side effects  No signs of bleeding noted  He verbalizes understanding of stroke symptoms and risk factor management  Patient monitors BP at home, not sure of average at this time  Notes they do inform PCP if they feel BP elevated  she is a non smoker  No specific diet  Maria Elena Mcgarry does not have any questions or concerns at this time

## 2020-03-06 NOTE — PROGRESS NOTES
Daily Note     Today's date: 3/6/2020  Patient name: Bee Esposito  : 1940  MRN: [id number]  Referring provider: Radha Em MD  Dx:   Encounter Diagnosis     ICD-10-CM    1  Intraparenchymal hemorrhage of brain (HCC) I61 9                   Subjective: "I love to eat"       Objective:     Performed multimatrix task simple of alphabet with pt required VCs up to 50% of the time to sequence activity however with repetition pt able to carryover with increased trials  Performed multimatrix task of alphabet while thinking of food with pt required cues approximatley 75% of time to sequence and 25% of time to problem solve foods that begin with letter  Throughout entire session pt required cues approximatley 75% of the time to be redirected to task secondary to internally distracted  Pt's grandson attended OT session and provided pt and son with HEP program for weekend focusing on attention and memory  Educated on providing cognitive rest breaks throughout day  Assessment: Tolerated treatment well  Patient would benefit from continued OT pt required frequent VCs for redirection and demonstrating interanl distractability during session and required redirection  Pt still limted by decreased sustained attention and sequencing task s      Plan: Continue per plan of care        Precautions: FALL SAFETY, Helmet

## 2020-03-06 NOTE — PHYSICAL THERAPY NOTE
PT DISCHARGE SUMMARY    Patient made good progress with skilled PT intervention where she presented initially with altered mental status, vomiting and HA> She was found to have  a 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 and underwent craniectomy and evacuation  2/11/20  She progressed well functionally and physically however was unable to achieve mod(I) goals 2* impaired insight into deficits  She was recommended for 24 hour S and use of RW at all times  Family in for training, all demonstrating sound decision making and in agreement with recommendation  Patient did not require any DME order from PT as she had [employer]  LE therex performed with patient in which she was recommended to continue at discharge  No further skilled PT required       Tavo Berger, PT

## 2020-03-09 ENCOUNTER — APPOINTMENT (OUTPATIENT)
Dept: SPEECH THERAPY | Facility: CLINIC | Age: 80
End: 2020-03-09
Payer: MEDICARE

## 2020-03-10 ENCOUNTER — OFFICE VISIT (OUTPATIENT)
Dept: OCCUPATIONAL THERAPY | Facility: CLINIC | Age: 80
End: 2020-03-10
Payer: MEDICARE

## 2020-03-10 DIAGNOSIS — I61.9 INTRAPARENCHYMAL HEMORRHAGE OF BRAIN (HCC): Primary | ICD-10-CM

## 2020-03-10 PROCEDURE — 97112 NEUROMUSCULAR REEDUCATION: CPT

## 2020-03-10 PROCEDURE — 97530 THERAPEUTIC ACTIVITIES: CPT

## 2020-03-10 NOTE — PROGRESS NOTES
Daily Note     Today's date: 3/10/2020  Patient name: Elke Durham  : 1940  MRN: [id number]  Referring provider: Kb Yoder MD  Dx:   Encounter Diagnosis     ICD-10-CM    1  Intraparenchymal hemorrhage of brain (HCC) I61 9                   Subjective: "I am tired today" pt requesting to have earlier sessions and adjusted schedule as needed  Objective:     Performed alphabetizing simple words with pt able to complete with 5 VCs overall for problem solving alphabet and 4 VCs for redirection secodnary to externally distracted  Performed functional math problmes of solving muliti step computations with door openi in semi modal environment x 10 problems with pt only requiring 1 VC for redirection throughout session  Performed switching attention from places/animals with pt able to complete with 50 % of cues for problem solving animals however only required 25% of time to be redirected       Assessment: Tolerated treatment well  Patient would benefit from continued PT and would benefit from continued OT pt demonstrating increased ability to concentrate and sustain attention during task  Plan: Continue per plan of care        Precautions: FALL SAFETY, Helmet

## 2020-03-11 ENCOUNTER — OFFICE VISIT (OUTPATIENT)
Dept: NEPHROLOGY | Facility: CLINIC | Age: 80
End: 2020-03-11

## 2020-03-11 VITALS
WEIGHT: 203 LBS | DIASTOLIC BLOOD PRESSURE: 68 MMHG | SYSTOLIC BLOOD PRESSURE: 140 MMHG | HEART RATE: 48 BPM | BODY MASS INDEX: 38.33 KG/M2 | HEIGHT: 61 IN

## 2020-03-11 DIAGNOSIS — I10 BENIGN ESSENTIAL HTN: ICD-10-CM

## 2020-03-11 DIAGNOSIS — N18.30 CKD (CHRONIC KIDNEY DISEASE), STAGE III (HCC): Primary | ICD-10-CM

## 2020-03-11 PROCEDURE — 1036F TOBACCO NON-USER: CPT | Performed by: INTERNAL MEDICINE

## 2020-03-11 PROCEDURE — 99215 OFFICE O/P EST HI 40 MIN: CPT | Performed by: INTERNAL MEDICINE

## 2020-03-11 PROCEDURE — 3078F DIAST BP <80 MM HG: CPT | Performed by: INTERNAL MEDICINE

## 2020-03-11 PROCEDURE — 1111F DSCHRG MED/CURRENT MED MERGE: CPT | Performed by: INTERNAL MEDICINE

## 2020-03-11 PROCEDURE — 3066F NEPHROPATHY DOC TX: CPT | Performed by: INTERNAL MEDICINE

## 2020-03-11 PROCEDURE — 1160F RVW MEDS BY RX/DR IN RCRD: CPT | Performed by: INTERNAL MEDICINE

## 2020-03-11 PROCEDURE — 4040F PNEUMOC VAC/ADMIN/RCVD: CPT | Performed by: INTERNAL MEDICINE

## 2020-03-11 PROCEDURE — 4010F ACE/ARB THERAPY RXD/TAKEN: CPT | Performed by: INTERNAL MEDICINE

## 2020-03-11 PROCEDURE — 3008F BODY MASS INDEX DOCD: CPT | Performed by: INTERNAL MEDICINE

## 2020-03-11 PROCEDURE — 3044F HG A1C LEVEL LT 7.0%: CPT | Performed by: INTERNAL MEDICINE

## 2020-03-11 PROCEDURE — 3077F SYST BP >= 140 MM HG: CPT | Performed by: INTERNAL MEDICINE

## 2020-03-11 RX ORDER — LOSARTAN POTASSIUM 25 MG/1
TABLET ORAL
Qty: 270 TABLET | Refills: 3 | Status: SHIPPED | OUTPATIENT
Start: 2020-03-11 | End: 2020-04-22

## 2020-03-11 NOTE — LETTER
March 11, 2020     Sangeetha Roach MD  [address]  [facility] [address]    Patient: Elke Durham   YOB: 1940   Date of Visit: 3/11/2020       Dear Dr Yesika Reardon:    Thank you for referring Jessica Cornell to me for evaluation  Below are my notes for this consultation  If you have questions, please do not hesitate to call me  I look forward to following your patient along with you  Sincerely,        Magali Silveira MD        CC: No Recipients  Magali Silveira MD  3/11/2020  4:41 PM  Sign at close encounter  [address] [address] y o  female MRN: [id number]  3/11/2020    Reason for Visit:  CKD stage 3    ASSESSMENT and PLAN:    I had the pleasure of seeing Ms Tyler Loja today in the renal clinic for the continued management of CKD III  Patient with recent admission in February with 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 on February 11th  Completed course of Keppra for seizure prophylaxis  57-year-old female with a past medical history of chronic kidney disease, venous stasis, HTN, hypothyroidism, lumbar canal stenosis, Anxiety, GERD, neuropathy, Gout, EF 37-80%, Grade 2 diastolic dysfunction who presents for follow up for CKD  To note, patient's  has now passed away in August 1) CKD - Prior year had nl Cr prior to aug 2016 and then had SHABANA during diarrhea episode  To note, patient also has a long standing history of HTN  Creatinine in 2013, 0 9 mg/dL  Cr early 2016 was 0 80-0 9 mg/dL; then increased starting in august 2016 to 2 3 mg/dL and has fluctuated since  CT scan in 2016, kidneys appearing normal at that time       Etiology of CKD may be long standing HTN and ATN  Baseline Cr ~ 1 1 -1 3 mg/dL     Most recent creatinine is 1 mg/dL     - Urine eos 0%;   - A1c controlled prior  - UA 10-20 WBC    Monitoring  -U PCR stable  0 15 stable  -SPEP unrevealing  -UPEP unrevealing  - C3, C4 unrevealing  - Renal u/s with R 10 3 cm, L 10 4 cm, renal cortex 1 cm b/l; both kidneys slightly reduced in size; lower pole of R kidney is non obstructing calculus  - Pt follows with Urology team for nephrolithiasis  - No NSAIDs, the patient is not currently taking NSAID  -nuc stress test per Cardiology in Jan 2019 unrevealing  - repeat renal u/s 1/2019 - unrevealing with exception of renal cortical atrophy; but also 6 mm non shadowing calculus   -since last being seen, the patient was unfortunately admitted to the hospital with cerebral hemorrhage  Patient's blood pressures at the time or 321 systolic  Approximately 1-2 weeks prior, patient called with blood pressures of 509 systolic  We had advised the patient to check blood pressures for 1-2 weeks and call back  Patient stated that during this check, blood pressures were ranging mainly in the 140s are lower at times  Therefore I explained to the patient is unclear why she had the bleed  Unclear if patient had a hypertensive crisis causing the bleed or if the bleed     Plan:  - increase losartan to 50 mg in the morning and 25 mg in the evening  -continue amlodipine  -continue Bumex and take extra half a tablet in the evening if edema worsens  - beta-blocker was held during hospitalization due to bradycardia-I have message the primary cardiologist to review  -we will see the patient back in 1 month for follow-up     2) SOB - Volume - follows with Cardiology     -Bumex parameters given to the patient  Continue 2 mg in the morning for now  Take extra 1 mg in the evening       3) HTN -      - cont losartan, bumex and amlodipine  - to note, with blood pressures less than 120, patient became symptomatic with dizziness and lightheadedness prior year      4) hypothyroid - as per Primary Care Physician     5) HPL - Primary Care following       6) Electrolytes - stable     7) MBD -     - Vit D 50 7 in nov 2019  - PTH improved 89 in nov 2019     8) gout -    -monitor for flare     9) back pain - follows with Pain management     10) Colonoscopy in feb with internal hemorrhoids and polyp removed       11) alk phos elevation     -monitoring for now, relatively stable during hospitalization     12) Anemia - Hb stable     -hemoglobin stable     13) Mitral valve calcification     - per Cardiology team     14) elevated D-dimer prior-patient was given duplex of lower extremities which was unrevealing     15) knee pain after fall in august     - saw Orthopedic team  - steroid injection was given    16) recent intracranial hemorrhage with mass effect     -needs tight blood pressure control  See above  - patient has Neurosurgery appointment in 2 weeks for follow-up     It was a pleasure evaluating your patient in the office today  Thank you for allowing our team to participate in the care of Ms Yary Edmondson  Please do not hesitate to contact our team if further issues/questions shall arise in the interim    No problem-specific Assessment & Plan notes found for this encounter  HPI:    Patient improving post hospitalization  Had craniectomy due to bleed  No recent fevers, chills, nausea, vomiting  PATIENT INSTRUCTIONS:    Patient Instructions   1) please increase your losartan to 2 tab in AM and 1 tab in PM of the 25 mg tab  2) please check BP daily at home, if above 172 systolic (top number), please call  3) labwork in one month  4) appointment in one month  5) if you do not hear from Dr Evangelista Antis office in one week for appt time, please call here  OBJECTIVE:  Current Weight: Weight - Scale: 92 1 kg (203 lb)  Vitals:    03/11/20 1509 03/11/20 1605   BP:  140/68   Pulse:  (!) 48   Weight: 92 1 kg (203 lb)    Height: 5' 1" (1 549 m)     Body mass index is 38 36 kg/m²  REVIEW OF SYSTEMS:    Review of Systems   Constitutional: Negative  Negative for fatigue  HENT: Negative  Eyes: Negative  Respiratory: Negative  Negative for shortness of breath  Cardiovascular: Positive for leg swelling  Gastrointestinal: Negative  Endocrine: Negative  Genitourinary: Negative  Negative for difficulty urinating  Musculoskeletal: Negative  Skin: Negative  Allergic/Immunologic: Negative  Neurological: Negative  Hematological: Negative  Psychiatric/Behavioral: Negative  All other systems reviewed and are negative  PHYSICAL EXAM:      Physical Exam   Constitutional: She is oriented to person, place, and time  She appears well-developed and well-nourished  No distress  HENT:   Head: Normocephalic  Mouth/Throat: No oropharyngeal exudate  Craniectomy site clean  No drainage  Eyes: Conjunctivae are normal  Right eye exhibits no discharge  Left eye exhibits no discharge  No scleral icterus  Neck: Normal range of motion  Neck supple  Cardiovascular: Normal rate and regular rhythm  Exam reveals no gallop and no friction rub  No murmur heard  Pulmonary/Chest: Effort normal and breath sounds normal  No respiratory distress  She has no wheezes  She has no rales  Abdominal: Soft  Bowel sounds are normal  She exhibits no distension  There is no tenderness  There is no rebound  Musculoskeletal: Normal range of motion  She exhibits edema  She exhibits no tenderness or deformity  1+ edema lower extremities bilaterally  Neurological: She is alert and oriented to person, place, and time  Coordination normal    Skin: Skin is warm and dry  No rash noted  She is not diaphoretic  No erythema  No pallor  Psychiatric: She has a normal mood and affect  Her behavior is normal  Judgment and thought content normal    Nursing note and vitals reviewed        Medications:    Current Outpatient Medications:     amLODIPine (NORVASC) 10 mg tablet, Take 1 tablet (10 mg total) by mouth daily, Disp: 30 tablet, Rfl: 0    atorvastatin (LIPITOR) 40 mg tablet, Take 1 tablet (40 mg total) by mouth every evening, Disp: 30 tablet, Rfl: 0    bumetanide (BUMEX) 2 mg tablet, Take 1 tablet (2 mg total) by mouth daily, Disp: 30 tablet, Rfl: 0    levothyroxine 112 mcg tablet, Take 1 tablet (112 mcg total) by mouth daily, Disp: 90 tablet, Rfl: 1    losartan (COZAAR) 25 mg tablet, Take 2 tab in AM and 1 tab in PM, Disp: 270 tablet, Rfl: 3    omeprazole (PriLOSEC) 20 mg delayed release capsule, Take 1 capsule (20 mg total) by mouth every morning, Disp: 90 capsule, Rfl: 1    Laboratory Results:        Invalid input(s): ALBUMIN    Results for orders placed or performed during the hospital encounter of 02/18/20   CBC and differential   Result Value Ref Range    WBC 10 28 (H) 4 31 - 10 16 Thousand/uL    RBC 4 16 3 81 - 5 12 Million/uL    Hemoglobin 11 3 (L) 11 5 - 15 4 g/dL    Hematocrit 36 6 34 8 - 46 1 %    MCV 88 82 - 98 fL    MCH 27 2 26 8 - 34 3 pg    MCHC 30 9 (L) 31 4 - 37 4 g/dL    RDW 16 0 (H) 11 6 - 15 1 %    MPV 11 9 8 9 - 12 7 fL    Platelets 019 301 - 295 Thousands/uL    nRBC 0 /100 WBCs   Basic metabolic panel   Result Value Ref Range    Sodium 140 136 - 145 mmol/L    Potassium 3 7 3 5 - 5 3 mmol/L    Chloride 105 100 - 108 mmol/L    CO2 28 21 - 32 mmol/L    ANION GAP 7 4 - 13 mmol/L    BUN 15 5 - 25 mg/dL    Creatinine 1 03 0 60 - 1 30 mg/dL    Glucose 88 65 - 140 mg/dL    Glucose, Fasting 88 65 - 99 mg/dL    Calcium 9 2 8 3 - 10 1 mg/dL    eGFR 51 ml/min/1 73sq m   CBC and differential   Result Value Ref Range    WBC 12 40 (H) 4 31 - 10 16 Thousand/uL    RBC 3 91 3 81 - 5 12 Million/uL    Hemoglobin 10 6 (L) 11 5 - 15 4 g/dL    Hematocrit 34 1 (L) 34 8 - 46 1 %    MCV 87 82 - 98 fL    MCH 27 1 26 8 - 34 3 pg    MCHC 31 1 (L) 31 4 - 37 4 g/dL    RDW 16 3 (H) 11 6 - 15 1 %    MPV 11 1 8 9 - 12 7 fL    Platelets 982 310 - 132 Thousands/uL    nRBC 0 /100 WBCs    Neutrophils Relative 82 (H) 43 - 75 %    Immat GRANS % 1 0 - 2 %    Lymphocytes Relative 9 (L) 14 - 44 %    Monocytes Relative 7 4 - 12 %    Eosinophils Relative 1 0 - 6 %    Basophils Relative 0 0 - 1 %    Neutrophils Absolute 10 15 (H) 1 85 - 7 62 Thousands/µL    Immature Grans Absolute 0 17 0 00 - 0 20 Thousand/uL    Lymphocytes Absolute 1 08 0 60 - 4 47 Thousands/µL    Monocytes Absolute 0 81 0 17 - 1 22 Thousand/µL    Eosinophils Absolute 0 15 0 00 - 0 61 Thousand/µL    Basophils Absolute 0 04 0 00 - 0 10 Thousands/µL   Basic metabolic panel   Result Value Ref Range    Sodium 136 136 - 145 mmol/L    Potassium 4 3 3 5 - 5 3 mmol/L    Chloride 101 100 - 108 mmol/L    CO2 29 21 - 32 mmol/L    ANION GAP 6 4 - 13 mmol/L    BUN 20 5 - 25 mg/dL    Creatinine 1 03 0 60 - 1 30 mg/dL    Glucose 110 65 - 140 mg/dL    Glucose, Fasting 110 (H) 65 - 99 mg/dL    Calcium 9 1 8 3 - 10 1 mg/dL    eGFR 51 ml/min/1 73sq m   CBC and differential   Result Value Ref Range    WBC 6 54 4 31 - 10 16 Thousand/uL    RBC 3 96 3 81 - 5 12 Million/uL    Hemoglobin 11 0 (L) 11 5 - 15 4 g/dL    Hematocrit 34 7 (L) 34 8 - 46 1 %    MCV 88 82 - 98 fL    MCH 27 8 26 8 - 34 3 pg    MCHC 31 7 31 4 - 37 4 g/dL    RDW 16 3 (H) 11 6 - 15 1 %    MPV 10 7 8 9 - 12 7 fL    Platelets 885 691 - 925 Thousands/uL    nRBC 0 /100 WBCs    Neutrophils Relative 72 43 - 75 %    Immat GRANS % 1 0 - 2 %    Lymphocytes Relative 14 14 - 44 %    Monocytes Relative 10 4 - 12 %    Eosinophils Relative 2 0 - 6 %    Basophils Relative 1 0 - 1 %    Neutrophils Absolute 4 74 1 85 - 7 62 Thousands/µL    Immature Grans Absolute 0 06 0 00 - 0 20 Thousand/uL    Lymphocytes Absolute 0 92 0 60 - 4 47 Thousands/µL    Monocytes Absolute 0 62 0 17 - 1 22 Thousand/µL    Eosinophils Absolute 0 16 0 00 - 0 61 Thousand/µL    Basophils Absolute 0 04 0 00 - 0 10 Thousands/µL   Basic metabolic panel   Result Value Ref Range    Sodium 139 136 - 145 mmol/L    Potassium 4 0 3 5 - 5 3 mmol/L    Chloride 103 100 - 108 mmol/L    CO2 30 21 - 32 mmol/L    ANION GAP 6 4 - 13 mmol/L    BUN 24 5 - 25 mg/dL    Creatinine 1 09 0 60 - 1 30 mg/dL    Glucose 105 65 - 140 mg/dL    Calcium 9 1 8 3 - 10 1 mg/dL    eGFR 48 ml/min/1 73sq m

## 2020-03-11 NOTE — PROGRESS NOTES
NEPHROLOGY OFFICE VISIT   Davonte Castillo [de-identified] y o  female MRN: [id number]  3/11/2020    Reason for Visit:  CKD stage 3    ASSESSMENT and PLAN:    I had the pleasure of seeing Ms Ryan Pichardo today in the renal clinic for the continued management of CKD III  Patient with recent admission in February with 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 on February 11th  Completed course of Keppra for seizure prophylaxis  26-year-old female with a past medical history of chronic kidney disease, venous stasis, HTN, hypothyroidism, lumbar canal stenosis, Anxiety, GERD, neuropathy, Gout, EF 45-50%, Grade 2 diastolic dysfunction who presents for follow up for CKD  To note, patient's  has now passed away in August 1) CKD - Prior year had nl Cr prior to aug 2016 and then had SHABANA during diarrhea episode  To note, patient also has a long standing history of HTN  Creatinine in 2013, 0 9 mg/dL  Cr early 2016 was 0 80-0 9 mg/dL; then increased starting in august 2016 to 2 3 mg/dL and has fluctuated since  CT scan in 2016, kidneys appearing normal at that time       Etiology of CKD may be long standing HTN and ATN  Baseline Cr ~ 1 1 -1 3 mg/dL     Most recent creatinine is 1 mg/dL     - Urine eos 0%;   - A1c controlled prior  - UA 10-20 WBC   Monitoring  -U PCR stable  0 15 stable  -SPEP unrevealing  -UPEP unrevealing  - C3, C4 unrevealing  - Renal u/s with R 10 3 cm, L 10 4 cm, renal cortex 1 cm b/l; both kidneys slightly reduced in size; lower pole of R kidney is non obstructing calculus  - Pt follows with Urology team for nephrolithiasis  - No NSAIDs, the patient is not currently taking NSAID  -nuc stress test per Cardiology in Jan 2019 unrevealing  - repeat renal u/s 1/2019 - unrevealing with exception of renal cortical atrophy; but also 6 mm non shadowing calculus   -since last being seen, the patient was unfortunately admitted to the hospital with cerebral hemorrhage  Patient's blood pressures at the time or 594 systolic  Approximately 1-2 weeks prior, patient called with blood pressures of 201 systolic  We had advised the patient to check blood pressures for 1-2 weeks and call back  Patient stated that during this check, blood pressures were ranging mainly in the 140s are lower at times  Therefore I explained to the patient is unclear why she had the bleed  Unclear if patient had a hypertensive crisis causing the bleed or if the bleed     Plan:  - increase losartan to 50 mg in the morning and 25 mg in the evening  -continue amlodipine  -continue Bumex and take extra half a tablet in the evening if edema worsens  - beta-blocker was held during hospitalization due to bradycardia-I have message the primary cardiologist to review  -we will see the patient back in 1 month for follow-up     2) SOB - Volume - follows with Cardiology     -Bumex parameters given to the patient  Continue 2 mg in the morning for now  Take extra 1 mg in the evening       3) HTN -      - cont losartan, bumex and amlodipine  - to note, with blood pressures less than 120, patient became symptomatic with dizziness and lightheadedness prior year      4) hypothyroid - as per Primary Care Physician     5) HPL - Primary Care following       6) Electrolytes - stable     7) MBD -     - Vit D 50 7 in nov 2019  - PTH improved 89 in nov 2019     8) gout -      -monitor for flare     9) back pain - follows with Pain management     10) Colonoscopy in feb with internal hemorrhoids and polyp removed       11) alk phos elevation     -monitoring for now, relatively stable during hospitalization     12) Anemia - Hb stable     -hemoglobin stable     13) Mitral valve calcification     - per Cardiology team     14) elevated D-dimer prior-patient was given duplex of lower extremities which was unrevealing     15) knee pain after fall in august     - saw Orthopedic team  - steroid injection was given    16) recent intracranial hemorrhage with mass effect     -needs tight blood pressure control  See above  - patient has Neurosurgery appointment in 2 weeks for follow-up     It was a pleasure evaluating your patient in the office today  Thank you for allowing our team to participate in the care of Ms Louise Warner  Please do not hesitate to contact our team if further issues/questions shall arise in the interim    No problem-specific Assessment & Plan notes found for this encounter  HPI:    Patient improving post hospitalization  Had craniectomy due to bleed  No recent fevers, chills, nausea, vomiting  PATIENT INSTRUCTIONS:    Patient Instructions   1) please increase your losartan to 2 tab in AM and 1 tab in PM of the 25 mg tab  2) please check BP daily at home, if above 142 systolic (top number), please call  3) labwork in one month  4) appointment in one month  5) if you do not hear from Dr Reynaldo Luis office in one week for appt time, please call here  OBJECTIVE:  Current Weight: Weight - Scale: 92 1 kg (203 lb)  Vitals:    03/11/20 1509 03/11/20 1605   BP:  140/68   Pulse:  (!) 48   Weight: 92 1 kg (203 lb)    Height: 5' 1" (1 549 m)     Body mass index is 38 36 kg/m²  REVIEW OF SYSTEMS:    Review of Systems   Constitutional: Negative  Negative for fatigue  HENT: Negative  Eyes: Negative  Respiratory: Negative  Negative for shortness of breath  Cardiovascular: Positive for leg swelling  Gastrointestinal: Negative  Endocrine: Negative  Genitourinary: Negative  Negative for difficulty urinating  Musculoskeletal: Negative  Skin: Negative  Allergic/Immunologic: Negative  Neurological: Negative  Hematological: Negative  Psychiatric/Behavioral: Negative  All other systems reviewed and are negative  PHYSICAL EXAM:      Physical Exam   Constitutional: She is oriented to person, place, and time  She appears well-developed and well-nourished  No distress     HENT: Head: Normocephalic  Mouth/Throat: No oropharyngeal exudate  Craniectomy site clean  No drainage  Eyes: Conjunctivae are normal  Right eye exhibits no discharge  Left eye exhibits no discharge  No scleral icterus  Neck: Normal range of motion  Neck supple  Cardiovascular: Normal rate and regular rhythm  Exam reveals no gallop and no friction rub  No murmur heard  Pulmonary/Chest: Effort normal and breath sounds normal  No respiratory distress  She has no wheezes  She has no rales  Abdominal: Soft  Bowel sounds are normal  She exhibits no distension  There is no tenderness  There is no rebound  Musculoskeletal: Normal range of motion  She exhibits edema  She exhibits no tenderness or deformity  1+ edema lower extremities bilaterally  Neurological: She is alert and oriented to person, place, and time  Coordination normal    Skin: Skin is warm and dry  No rash noted  She is not diaphoretic  No erythema  No pallor  Psychiatric: She has a normal mood and affect  Her behavior is normal  Judgment and thought content normal    Nursing note and vitals reviewed        Medications:    Current Outpatient Medications:     amLODIPine (NORVASC) 10 mg tablet, Take 1 tablet (10 mg total) by mouth daily, Disp: 30 tablet, Rfl: 0    atorvastatin (LIPITOR) 40 mg tablet, Take 1 tablet (40 mg total) by mouth every evening, Disp: 30 tablet, Rfl: 0    bumetanide (BUMEX) 2 mg tablet, Take 1 tablet (2 mg total) by mouth daily, Disp: 30 tablet, Rfl: 0    levothyroxine 112 mcg tablet, Take 1 tablet (112 mcg total) by mouth daily, Disp: 90 tablet, Rfl: 1    losartan (COZAAR) 25 mg tablet, Take 2 tab in AM and 1 tab in PM, Disp: 270 tablet, Rfl: 3    omeprazole (PriLOSEC) 20 mg delayed release capsule, Take 1 capsule (20 mg total) by mouth every morning, Disp: 90 capsule, Rfl: 1    Laboratory Results:        Invalid input(s): ALBUMIN    Results for orders placed or performed during the hospital encounter of 02/18/20 CBC and differential   Result Value Ref Range    WBC 10 28 (H) 4 31 - 10 16 Thousand/uL    RBC 4 16 3 81 - 5 12 Million/uL    Hemoglobin 11 3 (L) 11 5 - 15 4 g/dL    Hematocrit 36 6 34 8 - 46 1 %    MCV 88 82 - 98 fL    MCH 27 2 26 8 - 34 3 pg    MCHC 30 9 (L) 31 4 - 37 4 g/dL    RDW 16 0 (H) 11 6 - 15 1 %    MPV 11 9 8 9 - 12 7 fL    Platelets 959 599 - 080 Thousands/uL    nRBC 0 /100 WBCs   Basic metabolic panel   Result Value Ref Range    Sodium 140 136 - 145 mmol/L    Potassium 3 7 3 5 - 5 3 mmol/L    Chloride 105 100 - 108 mmol/L    CO2 28 21 - 32 mmol/L    ANION GAP 7 4 - 13 mmol/L    BUN 15 5 - 25 mg/dL    Creatinine 1 03 0 60 - 1 30 mg/dL    Glucose 88 65 - 140 mg/dL    Glucose, Fasting 88 65 - 99 mg/dL    Calcium 9 2 8 3 - 10 1 mg/dL    eGFR 51 ml/min/1 73sq m   CBC and differential   Result Value Ref Range    WBC 12 40 (H) 4 31 - 10 16 Thousand/uL    RBC 3 91 3 81 - 5 12 Million/uL    Hemoglobin 10 6 (L) 11 5 - 15 4 g/dL    Hematocrit 34 1 (L) 34 8 - 46 1 %    MCV 87 82 - 98 fL    MCH 27 1 26 8 - 34 3 pg    MCHC 31 1 (L) 31 4 - 37 4 g/dL    RDW 16 3 (H) 11 6 - 15 1 %    MPV 11 1 8 9 - 12 7 fL    Platelets 506 107 - 383 Thousands/uL    nRBC 0 /100 WBCs    Neutrophils Relative 82 (H) 43 - 75 %    Immat GRANS % 1 0 - 2 %    Lymphocytes Relative 9 (L) 14 - 44 %    Monocytes Relative 7 4 - 12 %    Eosinophils Relative 1 0 - 6 %    Basophils Relative 0 0 - 1 %    Neutrophils Absolute 10 15 (H) 1 85 - 7 62 Thousands/µL    Immature Grans Absolute 0 17 0 00 - 0 20 Thousand/uL    Lymphocytes Absolute 1 08 0 60 - 4 47 Thousands/µL    Monocytes Absolute 0 81 0 17 - 1 22 Thousand/µL    Eosinophils Absolute 0 15 0 00 - 0 61 Thousand/µL    Basophils Absolute 0 04 0 00 - 0 10 Thousands/µL   Basic metabolic panel   Result Value Ref Range    Sodium 136 136 - 145 mmol/L    Potassium 4 3 3 5 - 5 3 mmol/L    Chloride 101 100 - 108 mmol/L    CO2 29 21 - 32 mmol/L    ANION GAP 6 4 - 13 mmol/L    BUN 20 5 - 25 mg/dL Creatinine 1 03 0 60 - 1 30 mg/dL    Glucose 110 65 - 140 mg/dL    Glucose, Fasting 110 (H) 65 - 99 mg/dL    Calcium 9 1 8 3 - 10 1 mg/dL    eGFR 51 ml/min/1 73sq m   CBC and differential   Result Value Ref Range    WBC 6 54 4 31 - 10 16 Thousand/uL    RBC 3 96 3 81 - 5 12 Million/uL    Hemoglobin 11 0 (L) 11 5 - 15 4 g/dL    Hematocrit 34 7 (L) 34 8 - 46 1 %    MCV 88 82 - 98 fL    MCH 27 8 26 8 - 34 3 pg    MCHC 31 7 31 4 - 37 4 g/dL    RDW 16 3 (H) 11 6 - 15 1 %    MPV 10 7 8 9 - 12 7 fL    Platelets 925 748 - 911 Thousands/uL    nRBC 0 /100 WBCs    Neutrophils Relative 72 43 - 75 %    Immat GRANS % 1 0 - 2 %    Lymphocytes Relative 14 14 - 44 %    Monocytes Relative 10 4 - 12 %    Eosinophils Relative 2 0 - 6 %    Basophils Relative 1 0 - 1 %    Neutrophils Absolute 4 74 1 85 - 7 62 Thousands/µL    Immature Grans Absolute 0 06 0 00 - 0 20 Thousand/uL    Lymphocytes Absolute 0 92 0 60 - 4 47 Thousands/µL    Monocytes Absolute 0 62 0 17 - 1 22 Thousand/µL    Eosinophils Absolute 0 16 0 00 - 0 61 Thousand/µL    Basophils Absolute 0 04 0 00 - 0 10 Thousands/µL   Basic metabolic panel   Result Value Ref Range    Sodium 139 136 - 145 mmol/L    Potassium 4 0 3 5 - 5 3 mmol/L    Chloride 103 100 - 108 mmol/L    CO2 30 21 - 32 mmol/L    ANION GAP 6 4 - 13 mmol/L    BUN 24 5 - 25 mg/dL    Creatinine 1 09 0 60 - 1 30 mg/dL    Glucose 105 65 - 140 mg/dL    Calcium 9 1 8 3 - 10 1 mg/dL    eGFR 48 ml/min/1 73sq m

## 2020-03-12 ENCOUNTER — OFFICE VISIT (OUTPATIENT)
Dept: SPEECH THERAPY | Facility: CLINIC | Age: 80
End: 2020-03-12
Payer: MEDICARE

## 2020-03-12 ENCOUNTER — OFFICE VISIT (OUTPATIENT)
Dept: OCCUPATIONAL THERAPY | Facility: CLINIC | Age: 80
End: 2020-03-12
Payer: MEDICARE

## 2020-03-12 ENCOUNTER — EVALUATION (OUTPATIENT)
Dept: PHYSICAL THERAPY | Facility: CLINIC | Age: 80
End: 2020-03-12
Payer: MEDICARE

## 2020-03-12 DIAGNOSIS — I61.9 INTRAPARENCHYMAL HEMORRHAGE OF BRAIN (HCC): Primary | ICD-10-CM

## 2020-03-12 DIAGNOSIS — R48.8 OTHER SYMBOLIC DYSFUNCTIONS: Primary | ICD-10-CM

## 2020-03-12 DIAGNOSIS — I61.9 INTRAPARENCHYMAL HEMORRHAGE OF BRAIN (HCC): ICD-10-CM

## 2020-03-12 PROCEDURE — 97163 PT EVAL HIGH COMPLEX 45 MIN: CPT | Performed by: PHYSICAL THERAPIST

## 2020-03-12 PROCEDURE — 97112 NEUROMUSCULAR REEDUCATION: CPT

## 2020-03-12 PROCEDURE — 97530 THERAPEUTIC ACTIVITIES: CPT

## 2020-03-12 PROCEDURE — 92507 TX SP LANG VOICE COMM INDIV: CPT

## 2020-03-12 NOTE — PROGRESS NOTES
PT Evaluation     Today's date: 3/12/2020  Patient name: Melba Patel  : 1940  MRN: [id number]  Referring provider: David Casper MD  Dx:   Encounter Diagnosis     ICD-10-CM    1  Intraparenchymal hemorrhage of brain [facility]) I61 9                   Assessment  Assessment details: Patient is an [de-identified] y o  Female who reports for PT evaluation post-CVA  Patient states she had a stroke on , and that she is also receiving OT and Speech therapy  Patient reports to therapy with RW that she states she used before CVA  Patient's gross MMT for bilateral LE were rated 3-3+/5  Her functional strength was shown through 5xSTS test, which she performed in 21 seconds and shows decreased functional strength  Patient shows moderate risk of falls through her SANTOS score of 35/56, and TUG time of 21 31 seconds  Patients SANTOS score also shows that her static and dynamic balance need to be improved  Modified CTSIB was performed to evaluate patients balance, and patient was able to stand for 20 seconds with FTEO, FTEC 23 seconds, FTEO w/ foam for 28 seconds, and FTEC w/ foam for 7 seconds  Patients values demonstrate decreased proprioceptive awareness  Due to increased patient fatigue from 2 previous evaluations, 6 minute walk test was not evaluated  Patient will benefit from skilled PT services to improve her balance, muscular strength, functional mobility, and decrease risk of falls  Impairments: activity intolerance, impaired balance, impaired physical strength, lacks appropriate home exercise program and poor posture   Understanding of Dx/Px/POC: good   Prognosis: good    Goals  STG (4 Weeks)  1  Patient will be independent with basic HEP  2  Patient will improve SANTOS score form 35/56 to 41/56 to show minimal detectable change and improvement in balance  3  Patient will decrease TUG time from 21 31 to 18 41 seconds to show minimal detectable change and decrease in risk of falls  4   Patient will decrease 5xSTS test from 21 to 18 7 seconds to show minimal detectable change and improve functional strength        LTG (8 Weeks)  1  Patient will be independent with complex HEP  2  Patient will transfer from floor to standing independently for increased functional independence  3  Patient will ambulate outside without LOB for improved community mobility  4  Patient will improve endurance to complete 6 minute walk test     Plan  Patient would benefit from: skilled physical therapy  Planned therapy interventions: balance, balance/weight bearing training, flexibility, functional ROM exercises, gait training, graded activity, graded exercise, graded motor, home exercise program, manual therapy, neuromuscular re-education, patient education, postural training, strengthening, stretching, therapeutic activities, therapeutic exercise and therapeutic training  Frequency: 2x week  Plan of Care beginning date: 3/12/2020  Plan of Care expiration date: 6/4/2020        Subjective Evaluation    History of Present Illness  Mechanism of injury: Patient reports to PT evaluation stating that she had a stroke on Feb 10th  She also reports increased fatigue due to having OT and Speech evaluations today too  She uses a RW, which she also used prior to CVA  She currently reports 0/10 pain but states she occasionally has back pain    Pain  No pain reported    Social Support  Steps to enter house: yes  Stairs in house: no   Lives in: [employer]  Lives with: adult children    Employment status: not working  Hand dominance: right    Treatments  Previous treatment: physical therapy  Patient Goals  Patient goals for therapy: improved balance, independence with ADLs/IADLs and increased strength          Objective     Strength/Myotome Testing     Left Hip   Planes of Motion   Flexion: 3  Abduction: 3  Adduction: 3-    Right Hip   Planes of Motion   Flexion: 3  Abduction: 3  Adduction: 3-    Left Knee   Flexion: 3  Extension: 3+    Right Knee   Flexion: 3  Extension: 4-    Left Ankle/Foot   Dorsiflexion: 3+  Plantar flexion: 3+    Right Ankle/Foot   Dorsiflexion: 3+  Plantar flexion: 3+    Functional Assessment        Comments  5xSTS: 21 seconds      FTEO: 30  FETC: 23 seconds  FTEO w/ foam: 28 seconds  FTEC w/ foam: 7 seconds      SANTOS/56      TU 31 seconds      10 Meter Walk Test: 10 87 seconds             Precautions:  has a past medical history of Anxiety, Arthritis, Cataract, Disease of thyroid gland, Eczema, GERD (gastroesophageal reflux disease), Gout, Heart failure ([facility] [address] ), Hepatitis, Hiatal hernia, Hypertension, Onychomycosis, Orthopnea, and Sleep apnea

## 2020-03-12 NOTE — PROGRESS NOTES
Daily Speech Treatment Note    Today's date: 3/12/2020  Patients name: Kathi Quinn  : 1940  MRN: [id number]  Safety measures: Intraparenchymal hemorrhage of brain  Referring provider: Romayne Coaster, MD          Primary Diagnosis/Billing code: H95 4  Secondary Diagnosis/ Billing code: I61 9    Visit Tracking:  -Referring provider: Epic  -Billing guidelines: CMS  -Visit #2/10   -Insurance: Medicare  -RE due 2020    Subjective/Behavioral:  -Patient arrived to facility with her grandson, 15 minutes late  Objective/Assessment:  -Reviewed testing results and goals in plan care with patient  Patient is in agreement at this time  Short-term goals:  Goal 1: To target mental manipulation and working memory, patient will participate in word finding activity (i e , anagrams) with 80% accuracy, to be achieved in 4-6 weeks  To target word finding, patient was presented with a category and instructed to name words beginning with a specific letter (ie , Color with B: BLUE) task completed in  opp increasing to  given min semantic cues  Goal 2: Patient will complete concrete and abstract categorization tasks to 80% accuracy to facilitate improved generative naming skills and working memory, to be achieved in 4-6 weeks  To target generative naming and working memory, patient was instructed to name the concrete category for given words (ie , apple, banana, plum: FRUITS) task completed in  opp independently  To target generative naming and working memory, patient was instructed to name the abstract category for given words (ie , apple, banana, plum: FRUITS) task completed in  opp independently, increasing to  with min semantic cues         Goal 3: Patient will demonstrate functional problem solving skills and provide appropriate solutions to problems with 80% accuracy to facilitate increased safety and awareness in functional living environment, to be achieved in 4-6 weeks     Goal 4: Patient will facilitate planning by completing thought organization tasks (e g , sequencing, deduction puzzles, etc ) with 80% accuracy to facilitate increased executive functioning, working memory, problem solving, and processing skills, to be achieved in 4-6 weeks  Goal 5: Patient will complete auditory immediate and short term memory tasks to 80% accuracy to facilitate increased ability to retell narratives and recall information within functional living environment, to be achieved in 4-6 weeks  Goal 6: Patient will answer [facility]- questions regarding story read aloud with 80% accuracy to facilitate improved auditory comprehension and recall, to be achieved in 4-6 weeks  Plan:  -Patient was provided with home exercises/activities to target goals in plan of care at the end of today's session   -Continue with current plan of care

## 2020-03-13 ENCOUNTER — OFFICE VISIT (OUTPATIENT)
Dept: FAMILY MEDICINE CLINIC | Facility: CLINIC | Age: 80
End: 2020-03-13
Payer: MEDICARE

## 2020-03-13 VITALS
OXYGEN SATURATION: 98 % | BODY MASS INDEX: 38.82 KG/M2 | WEIGHT: 205.6 LBS | TEMPERATURE: 98.6 F | HEART RATE: 78 BPM | DIASTOLIC BLOOD PRESSURE: 82 MMHG | SYSTOLIC BLOOD PRESSURE: 126 MMHG | RESPIRATION RATE: 18 BRPM | HEIGHT: 61 IN

## 2020-03-13 DIAGNOSIS — E78.5 HYPERLIPIDEMIA, UNSPECIFIED HYPERLIPIDEMIA TYPE: ICD-10-CM

## 2020-03-13 DIAGNOSIS — IMO0001 TRANSITION OF CARE PERFORMED WITH SHARING OF CLINICAL SUMMARY: Primary | ICD-10-CM

## 2020-03-13 DIAGNOSIS — E03.9 HYPOTHYROIDISM, UNSPECIFIED TYPE: ICD-10-CM

## 2020-03-13 DIAGNOSIS — R73.03 PREDIABETES: ICD-10-CM

## 2020-03-13 DIAGNOSIS — I10 BENIGN ESSENTIAL HYPERTENSION: ICD-10-CM

## 2020-03-13 DIAGNOSIS — N18.30 CKD (CHRONIC KIDNEY DISEASE), STAGE III (HCC): ICD-10-CM

## 2020-03-13 DIAGNOSIS — K21.9 GASTROESOPHAGEAL REFLUX DISEASE WITHOUT ESOPHAGITIS: Chronic | ICD-10-CM

## 2020-03-13 DIAGNOSIS — D50.8 IRON DEFICIENCY ANEMIA SECONDARY TO INADEQUATE DIETARY IRON INTAKE: ICD-10-CM

## 2020-03-13 PROBLEM — Z78.9 TRANSITION OF CARE PERFORMED WITH SHARING OF CLINICAL SUMMARY: Status: ACTIVE | Noted: 2020-03-13

## 2020-03-13 PROCEDURE — 99496 TRANSJ CARE MGMT HIGH F2F 7D: CPT | Performed by: FAMILY MEDICINE

## 2020-03-13 PROCEDURE — 1111F DSCHRG MED/CURRENT MED MERGE: CPT | Performed by: FAMILY MEDICINE

## 2020-03-13 RX ORDER — AMLODIPINE BESYLATE 10 MG/1
10 TABLET ORAL DAILY
Qty: 90 TABLET | Refills: 1 | Status: SHIPPED | OUTPATIENT
Start: 2020-03-13 | End: 2020-07-10 | Stop reason: SDUPTHER

## 2020-03-13 RX ORDER — ATORVASTATIN CALCIUM 40 MG/1
40 TABLET, FILM COATED ORAL EVERY EVENING
Qty: 90 TABLET | Refills: 1 | Status: SHIPPED | OUTPATIENT
Start: 2020-03-13 | End: 2020-07-10 | Stop reason: SDUPTHER

## 2020-03-13 RX ORDER — LEVOTHYROXINE SODIUM 112 UG/1
112 TABLET ORAL DAILY
Qty: 90 TABLET | Refills: 1 | Status: SHIPPED | OUTPATIENT
Start: 2020-03-13 | End: 2020-07-10 | Stop reason: SDUPTHER

## 2020-03-13 NOTE — ASSESSMENT & PLAN NOTE
Stable on levothyroxine 112 mcg daily  Continue same    Patient advised metabolic labs at 3 month interval

## 2020-03-13 NOTE — ASSESSMENT & PLAN NOTE
Hemoglobin stable  Continue monitoring  Patient was not able to tolerate oral iron supplements in the past   Advised iron rich diet

## 2020-03-13 NOTE — ASSESSMENT & PLAN NOTE
Patient is improving significantly with speech therapy and OT  Continue same    Has follow-up with Neurology surgery

## 2020-03-13 NOTE — PROGRESS NOTES
FAMILY MEDICINE TRANSITION OF CARE OFFICE VISIT  [facility]    NAME: Rosalind Wise  AGE: [de-identified] y o  SEX: female  : 1940       DATE: 3/13/2020    Assessment and Plan     Gastroesophageal reflux disease without esophagitis  Stable on omeprazole, continue same  Hypothyroidism  Stable on levothyroxine 112 mcg daily  Continue same  Patient advised metabolic labs at 3 month interval     Benign essential hypertension  Well controlled on losartan 25 milligram, amlodipine 10 milligram, bumetanide 2 milligram daily  Continue same  Patient has fup with nephrology x 4 weeks     Acute on chronic diastolic congestive heart failure (HCC)  Wt Readings from Last 3 Encounters:   20 93 3 kg (205 lb 9 6 oz)   20 92 1 kg (203 lb)   20 92 1 kg (203 lb 0 7 oz)             Chronic diastolic (congestive) heart failure (HCC)  Wt Readings from Last 3 Encounters:   20 93 3 kg (205 lb 9 6 oz)   20 92 1 kg (203 lb)   20 92 1 kg (203 lb 0 7 oz)     Continue management per cardiology team           CKD (chronic kidney disease), stage III ([address] )  Creatinine stable 1 09  Continue management per Nephrology  Hyperlipemia  Continue Lipitor 40 milligram   Metabolic labs at 3 months interval     Iron deficiency anemia secondary to inadequate dietary iron intake  Hemoglobin stable  Continue monitoring  Patient was not able to tolerate oral iron supplements in the past   Advised iron rich diet  Transition of care performed with sharing of clinical summary  Patient is improving significantly with speech therapy and OT  Continue same    Has follow-up with Neurology surgery      - Counseling Documentation: patient was counseled regarding: diagnostic results, instructions for management, risk factor reductions, prognosis, patient and family education, impressions, risks and benefits of treatment options and importance of compliance with treatment    Transitional Care Management Review     Mimi Morris is a [de-identified] y o  female here for TCM follow-up    During the TCM phone call patient stated:    TCM Call (since 2/11/2020)     Date and time call was made  3/2/2020 10:56 AM    Hospital care reviewed  Records reviewed    Patient was hospitialized at  [facility]    Date of Admission  02/18/20    Date of discharge  02/29/20    Diagnosis  Intraparenchymal hemorrhage of brain [facility])     Disposition  Home    Were the patients medications reviewed and updated  Yes    Current Symptoms  None      TCM Call (since 2/11/2020)     Post hospital issues  None    Scheduled for follow up? Yes    Patients specialists  Nephrologist; Other (comment); Neurologist    Nephrologist name  Peggy Bernabe    Neurologist name  Claude Klein    Other specialists names  PT    Did you obtain your prescribed medications  Yes    Do you need help managing your prescriptions or medications  No    Is transportation to your appointment needed  No    I have advised the patient to call PCP with any new or worsening symptoms  WAN AGUILAR MA    Living Arrangements  Family members    Support System  Family    Do you have social support  Yes, as much as I need    Are you recieving any outpatient services  Yes    What type of services  PT AND OT    Are you recieving home care services  No    Are you using any community resources  No    Current waiver services  No    Have you fallen in the last 12 months  No    Interperter language line needed  No          History of Present Illness     HPI  Patient is here to follow-up from recent hospital discharge where she was mental status changes vomiting  Found to a large right parietal temporal intraparenchymal hemorrhage with mass effect  Patient required craniotomy and evacuation  Patient is wearing cranial helmet  Is scheduled to repeat CT Head  Is scheduled to followup with neurosurgery 3/24/20    Per family she is doing well with physical therapy occupational therapy and speech therapy  Complains of mild tremors of Right hand  Ambulating with walker  Good family support  The following portions of the patient's history were reviewed and updated as appropriate: allergies, current medications, past family history, past medical history, past social history, past surgical history and problem list     Review of Systems       Review of Systems   Constitutional: Negative  Eyes: Negative  Respiratory: Negative  Negative for shortness of breath  Cardiovascular: Negative  Negative for chest pain and palpitations  Gastrointestinal: Negative  Endocrine: Negative  Genitourinary: Negative  Musculoskeletal: Negative  Negative for myalgias  Neurological: Negative  Negative for headaches  Psychiatric/Behavioral: Negative          Active Problem List     Patient Active Problem List   Diagnosis    Morbid obesity due to excess calories ([facility])    Benign essential hypertension    Chronic diastolic (congestive) heart failure ([facility])    Hypothyroidism    Acute on chronic diastolic congestive heart failure ([facility])    Arthropathy of knee    Hallux abductovalgus with bunions, unspecified laterality    Atherosclerosis of arteries of extremities ([facility])    Chronic low back pain    Chronic venous insufficiency    CKD (chronic kidney disease), stage III ([facility])    Difficulty in walking    Diverticulitis of colon    Elevated triglycerides with high cholesterol    Chronic gout without tophus    Hiatal hernia    Hyperlipemia    Hyperuricemia    Knee pain    Lumbar disc herniation with radiculopathy    Morbid obesity with body mass index of 40 0-44 9 in adult ([facility])    Nephrolithiasis    Onychomycosis    WENDI (obstructive sleep apnea)    Umbilical hernia    Tarsal tunnel syndrome    Rupture of popliteal cyst    Pseudogout of left wrist    Plantar fascial fibromatosis    Anxiety    Alkaline phosphatase elevation    Pain in both feet    Gastroesophageal reflux disease without esophagitis    Iron deficiency anemia secondary to inadequate dietary iron intake    Radiculopathy of lumbar region    Corns    SOB (shortness of breath)    Eczema    Osteoporosis screening    Peripheral arteriosclerosis (HCC)    Intraparenchymal hemorrhage of brain (HCC)       Objective     /82   Pulse 78   Temp 98 6 °F (37 °C)   Resp 18   Ht 5' 1" (1 549 m)   Wt 93 3 kg (205 lb 9 6 oz)   SpO2 98%   BMI 38 85 kg/m²     Physical Exam   Constitutional: She is oriented to person, place, and time  She appears well-developed and well-nourished  HENT:   Mouth/Throat: Oropharynx is clear and moist    Eyes: Pupils are equal, round, and reactive to light  Neck: Normal range of motion  Cardiovascular: Normal rate, regular rhythm and normal heart sounds  Pulmonary/Chest: Effort normal and breath sounds normal    Abdominal: Soft  Bowel sounds are normal    Musculoskeletal: Normal range of motion  Neurological: She is alert and oriented to person, place, and time  Skin:   SKULL INCISION, HEALING WELL  Psychiatric: Her behavior is normal  Judgment normal        Laboratory Results: I have personally reviewed the pertinent laboratory results/reports     Radiology/Other Diagnostic Testing Results: I have personally reviewed pertinent reports  Ct Head Wo Contrast    Result Date: 3/6/2020  CT BRAIN - WITHOUT CONTRAST INDICATION:   I61 9: Nontraumatic intracerebral hemorrhage, unspecified  COMPARISON:  2/24/2020 TECHNIQUE:  CT examination of the brain was performed  In addition to axial images, coronal 2D reformatted images were created and submitted for interpretation  Radiation dose length product (DLP) for this visit:  847 89 mGy-cm     This examination, like all CT scans performed in the [facility], was performed utilizing techniques to minimize radiation dose exposure, including the use of iterative  reconstruction and automated exposure control  IMAGE QUALITY:  Diagnostic  FINDINGS: PARENCHYMA:  There is a low-density area seen within the right temporal lobe which is presumably either some residual edema or encephalomalacia from the prior area of hemorrhage  Currently, there is no parenchymal hemorrhage identified  There is no parenchymal mass identified  There is no midline shift  There is no evidence of acute large vessel territorial infarction  Overall, the brain appears improved from prior  VENTRICLES AND EXTRA-AXIAL SPACES:  There is a persistent low density fluid collection seen around a portion of the right hemisphere  On the coronal reformatted images, it seems that this fluid is partially in the subgaleal location and perhaps partly subdural location  There is no evidence of acute hemorrhage  There is no significant mass effect on the brain  There is no subarachnoid blood  There is no hydrocephalus  VISUALIZED ORBITS AND PARANASAL SINUSES:  Unremarkable  CALVARIUM AND EXTRACRANIAL SOFT TISSUES:  The right sided craniectomy defect appears satisfactory  No change  Improved appearance of the brain with diminished hypodensity in keeping with resolving edema  There may be some encephalomalacia from the prior hemorrhage  There is no evidence of acute blood within the parenchyma or in the extra-axial space  Low-density fluid collection along the right side of the head seems to probably be a combination of subdural and subgaleal fluid, similar to prior  No significant mass effect or midline shift   Workstation performed: [id number]        Current Medications     Current Outpatient Medications:     amLODIPine (NORVASC) 10 mg tablet, Take 1 tablet (10 mg total) by mouth daily, Disp: 30 tablet, Rfl: 0    atorvastatin (LIPITOR) 40 mg tablet, Take 1 tablet (40 mg total) by mouth every evening, Disp: 30 tablet, Rfl: 0    bumetanide (BUMEX) 2 mg tablet, Take 1 tablet (2 mg total) by mouth daily, Disp: 30 tablet, Rfl: 0    levothyroxine 112 mcg tablet, Take 1 tablet (112 mcg total) by mouth daily, Disp: 90 tablet, Rfl: 1    losartan (COZAAR) 25 mg tablet, Take 2 tab in AM and 1 tab in PM, Disp: 270 tablet, Rfl: 3    omeprazole (PriLOSEC) 20 mg delayed release capsule, Take 1 capsule (20 mg total) by mouth every morning, Disp: 90 capsule, Rfl: 1    Suzanna Lopez MD  [place] [id number]

## 2020-03-13 NOTE — ASSESSMENT & PLAN NOTE
99.1 Well controlled on losartan 25 milligram, amlodipine 10 milligram, bumetanide 2 milligram daily  Continue same   Patient has fup with nephrology x 4 weeks

## 2020-03-13 NOTE — ASSESSMENT & PLAN NOTE
Wt Readings from Last 3 Encounters:   03/13/20 93 3 kg (205 lb 9 6 oz)   03/11/20 92 1 kg (203 lb)   02/26/20 92 1 kg (203 lb 0 7 oz)     Continue management per cardiology team

## 2020-03-13 NOTE — ASSESSMENT & PLAN NOTE
Wt Readings from Last 3 Encounters:   03/13/20 93 3 kg (205 lb 9 6 oz)   03/11/20 92 1 kg (203 lb)   02/26/20 92 1 kg (203 lb 0 7 oz)

## 2020-03-13 NOTE — PROGRESS NOTES
Daily Note     Today's date: 3/12/2020  Patient name: Davey English  : 1940  MRN: [id number]  Referring provider: Rosmery Menard MD  Dx:   Encounter Diagnosis     ICD-10-CM    1  Intraparenchymal hemorrhage of brain (HCC) I61 9                   Subjective: "I gotta slow down"      Objective:     Performed dual tasking task of trail making (1-A-7-G) with 3 external cues  while navigating through multimodal environment with patient presenting with decreased safety awareness  Pt  overall had fair insight into performance  Pt performed attention task of decoding required up to moderate VCs for problem solving to keep track of sequence  Assessment: Tolerated treatment well  Patient would benefit from continued OT      Plan: Continue per plan of care        Precautions: FALL SAFETY, Helmet

## 2020-03-16 ENCOUNTER — OFFICE VISIT (OUTPATIENT)
Dept: SPEECH THERAPY | Facility: CLINIC | Age: 80
End: 2020-03-16
Payer: MEDICARE

## 2020-03-16 ENCOUNTER — OFFICE VISIT (OUTPATIENT)
Dept: OCCUPATIONAL THERAPY | Facility: CLINIC | Age: 80
End: 2020-03-16
Payer: MEDICARE

## 2020-03-16 ENCOUNTER — TELEPHONE (OUTPATIENT)
Dept: FAMILY MEDICINE CLINIC | Facility: CLINIC | Age: 80
End: 2020-03-16

## 2020-03-16 DIAGNOSIS — R48.8 OTHER SYMBOLIC DYSFUNCTIONS: Primary | ICD-10-CM

## 2020-03-16 DIAGNOSIS — I61.9 INTRAPARENCHYMAL HEMORRHAGE OF BRAIN (HCC): ICD-10-CM

## 2020-03-16 DIAGNOSIS — I61.9 INTRAPARENCHYMAL HEMORRHAGE OF BRAIN (HCC): Primary | ICD-10-CM

## 2020-03-16 PROCEDURE — 92507 TX SP LANG VOICE COMM INDIV: CPT | Performed by: SPEECH-LANGUAGE PATHOLOGIST

## 2020-03-16 PROCEDURE — 97112 NEUROMUSCULAR REEDUCATION: CPT

## 2020-03-16 PROCEDURE — 97530 THERAPEUTIC ACTIVITIES: CPT

## 2020-03-16 NOTE — TELEPHONE ENCOUNTER
Cyndee Mcwilliams called for [facility] regarding her Therapy appointments  Rosalind does not want to go to them with what is going on and she feels good that she doesn't need to go at all  She would Like Dr Alexi Mcdonnell opinion on this? Please call back

## 2020-03-16 NOTE — PROGRESS NOTES
Daily Speech Treatment Note    Today's date: 3/16/2020  Patients name: Divya Del Cid  : 1940  MRN: [id number]  Safety measures: Intraparenchymal hemorrhage of brain  Referring provider: Nithya Zhang MD          Primary Diagnosis/Billing code: I83 5  Secondary Diagnosis/ Billing code: I61 9    Visit Tracking:  -Referring provider: Epic  -Billing guidelines: CMS  -Visit #3/10   -Insurance: Medicare  -RE due 2020    Subjective/Behavioral:  -Patient arrived to facility with her grandson, 15 minutes late  Objective/Assessment:  -Reviewed testing results and goals in plan care with patient  Patient is in agreement at this time  Patient adjusted well to new speech therapist  Ashish Samano easily established  Short-term goals:  Goal 1: To target mental manipulation and working memory, patient will participate in word finding activity (i e , anagrams) with 80% accuracy, to be achieved in 4-6 weeks  A working memory task was completed with 71% accuracy  A word order task involving presentation of groups of 4 spoken words was used for this task  Goal 2: Patient will complete concrete and abstract categorization tasks to 80% accuracy to facilitate improved generative naming skills and working memory, to be achieved in 4-6 weeks  [place] categorization was at 100% accuracy  Add to the category, concrete level, was completed with 95% accuracy  Goal 3: Patient will demonstrate functional problem solving skills and provide appropriate solutions to problems with 80% accuracy to facilitate increased safety and awareness in functional living environment, to be achieved in 4-6 weeks  Goal 4: Patient will facilitate planning by completing thought organization tasks (e g , sequencing, deduction puzzles, etc ) with 80% accuracy to facilitate increased executive functioning, working memory, problem solving, and processing skills, to be achieved in 4-6 weeks      Goal 5: Patient will complete auditory immediate and short term memory tasks to 80% accuracy to facilitate increased ability to retell narratives and recall information within functional living environment, to be achieved in 4-6 weeks  Goal 6: Patient will answer [facility]- questions regarding story read aloud with 80% accuracy to facilitate improved auditory comprehension and recall, to be achieved in 4-6 weeks  Plan:  -Patient was provided with home exercises/activities to target goals in plan of care at the end of today's session   -Continue with current plan of care

## 2020-03-16 NOTE — PROGRESS NOTES
Daily Note     Today's date: 3/16/2020  Patient name: Kelly John  : 1940  MRN: [id number]  Referring provider: Matheus Styles MD  Dx:   Encounter Diagnosis     ICD-10-CM    1  Intraparenchymal hemorrhage of brain (HCC) I61 9                   Subjective: "I am doing okay"      Objective: performed multimatrix task of simple letters with pt able to complete with up to min VCs at beginning and then able to carrover then performed switching attention from one card to another card with pt required up to moderate VCs at beginning however was able to carryover  Educated on built up writing utensils with weights and performed with good effect  Provided information for pt to purchase on own  Pt wrote name iwht good legibility    Assessment: Tolerated treatment well  Patient would benefit from continued OT  Pt able to sustain attention throughout task and required cues for recall rather than attention during session     Plan: Continue per plan of care  Precautions:  has a past medical history of Anxiety, Arthritis, Cataract, Disease of thyroid gland, Eczema, GERD (gastroesophageal reflux disease), Gout, Heart failure ([address] ), Hepatitis, Hiatal hernia, Hypertension, Onychomycosis, Orthopnea, and Sleep apnea

## 2020-03-17 ENCOUNTER — APPOINTMENT (OUTPATIENT)
Dept: PHYSICAL THERAPY | Facility: CLINIC | Age: 80
End: 2020-03-17
Payer: MEDICARE

## 2020-03-18 ENCOUNTER — APPOINTMENT (OUTPATIENT)
Dept: OCCUPATIONAL THERAPY | Facility: CLINIC | Age: 80
End: 2020-03-18
Payer: MEDICARE

## 2020-03-18 ENCOUNTER — APPOINTMENT (OUTPATIENT)
Dept: SPEECH THERAPY | Facility: CLINIC | Age: 80
End: 2020-03-18
Payer: MEDICARE

## 2020-03-19 ENCOUNTER — APPOINTMENT (OUTPATIENT)
Dept: PHYSICAL THERAPY | Facility: CLINIC | Age: 80
End: 2020-03-19
Payer: MEDICARE

## 2020-03-19 ENCOUNTER — TELEPHONE (OUTPATIENT)
Dept: SPEECH THERAPY | Facility: CLINIC | Age: 80
End: 2020-03-19

## 2020-03-19 NOTE — TELEPHONE ENCOUNTER
Patient reports that she would like to be taken off of the schedule until the beginning of April  She reports that she has an appointment with surgeon on 3/24/20 and will follow-up with clinic regarding future appointments after that point

## 2020-03-23 ENCOUNTER — TELEPHONE (OUTPATIENT)
Dept: NEUROLOGY | Facility: CLINIC | Age: 80
End: 2020-03-23

## 2020-03-23 ENCOUNTER — APPOINTMENT (OUTPATIENT)
Dept: SPEECH THERAPY | Facility: CLINIC | Age: 80
End: 2020-03-23
Payer: MEDICARE

## 2020-03-23 ENCOUNTER — APPOINTMENT (OUTPATIENT)
Dept: OCCUPATIONAL THERAPY | Facility: CLINIC | Age: 80
End: 2020-03-23
Payer: MEDICARE

## 2020-03-23 NOTE — TELEPHONE ENCOUNTER
27 Day Post CVA Discharge Follow Up  7 day call can be located 3/6  Hospitalization: 2/10-2/18, [facility] 2/18-2/28  The purpose of this phone call is to assess patient's general wellbeing or for any assistance needed with follow-up care  Called patient, spoke with Debra Nicolas  Patient continues to be free of any new or worsening stroke symptoms  States she has improved with multitasking and cueing  States family continues to assist with ADLs as supervision and does not allow patient to bend  Otherwise, reports she is well  No change in functionality, please refer to 7 day follow up call  Scheduled with neurosurgery tomorrow  Had eye exam 3/20  Last PCP appt 3/13  Stroke hospital follow up appointment scheduled 4/10 with Dr Manuel Zuniga  I reviewed medications with him  Losartan increased to 25 mg two in the morning, one in the afternoon  Reports taking all as prescribed with no missed doses or medication side effects  No signs of bleeding noted  Verbalizes understanding of stroke symptoms and risk factor management  Patient monitors BP, he does not know average "think she's been good"  she is a non smoker  Following low salt diet  Son does not have any questions or concerns at this time

## 2020-03-24 ENCOUNTER — TELEPHONE (OUTPATIENT)
Dept: OCCUPATIONAL THERAPY | Facility: CLINIC | Age: 80
End: 2020-03-24

## 2020-03-24 ENCOUNTER — TELEPHONE (OUTPATIENT)
Dept: NEUROSURGERY | Facility: CLINIC | Age: 80
End: 2020-03-24

## 2020-03-24 ENCOUNTER — TELEMEDICINE (OUTPATIENT)
Dept: NEUROSURGERY | Facility: CLINIC | Age: 80
End: 2020-03-24

## 2020-03-24 DIAGNOSIS — Z98.890 S/P CRANIOTOMY: ICD-10-CM

## 2020-03-24 DIAGNOSIS — Z98.890 STATUS POST CRANIECTOMY: ICD-10-CM

## 2020-03-24 DIAGNOSIS — I61.9 INTRAPARENCHYMAL HEMORRHAGE OF BRAIN (HCC): Primary | ICD-10-CM

## 2020-03-24 PROCEDURE — 99024 POSTOP FOLLOW-UP VISIT: CPT | Performed by: NEUROLOGICAL SURGERY

## 2020-03-24 NOTE — LETTER
March 24, 2020     Gilbert Mclean MD  [address]  [facility] [address]    Patient: Lynsey Cheek   YOB: 1940   Date of Visit: 3/24/2020       Dear Dr Kaitlin Townsend:    Thank you for referring Nicolas Ziegler to me for evaluation  Below are my notes for this consultation  If you have questions, please do not hesitate to call me  I look forward to following your patient along with you           Sincerely,        Ansley Caicedo MD        CC: No Recipients

## 2020-03-24 NOTE — PROGRESS NOTES
Virtual Regular Visit    Problem List Items Addressed This Visit        Nervous and Auditory    Intraparenchymal hemorrhage of brain ([facility] [address] ) - Primary    Relevant Orders    CT head without contrast      Other Visit Diagnoses     S/P craniotomy        Relevant Orders    CT head without contrast               Reason for visit is 6 week post-op visit with [address]  Encounter provider Lalo Nolen MD    Provider located at [address] [address] [phone]      Recent Visits  No visits were found meeting these conditions  Showing recent visits within past 7 days and meeting all other requirements     Today's Visits  Date Type Provider Dept   03/24/20 Telephone [address] today's visits and meeting all other requirements     Future Appointments  Date Type Provider Dept   03/24/20 Telephone [address] future appointments within next 150 days and meeting all other requirements        After connecting through [place], the patient was identified by name and date of birth  Sebastian Schwarz was informed that this is a telemedicine visit and that the visit is being conducted through [employer] which may not be secure and therefore, might not be HIPAA-compliant  My office door was closed  No one else was in the room  She acknowledged consent and understanding of privacy and security of the video platform  The patient has agreed to participate and understands they can discontinue the visit at any time  Kevin Avina is a [de-identified] y o  female who presented to the ER with headaches, nausea, and hypertension  Patient was found to have brain bleed  She is s/p Right decompressive craniectomy and evacuation of intraparenchymal hematoma on 2/11/2020  Patient complained of some numbness and tingling on her scalp and periodic headaches    She otherwise is neurologically intact and doing well      Past Medical History:   Diagnosis Date    Anxiety     Arthritis     joints    Cataract     Disease of thyroid gland     Eczema     GERD (gastroesophageal reflux disease)     Gout     Heart failure ([address] )     Hepatitis     Hiatal hernia     Hypertension     Onychomycosis     last assessed: 16    Orthopnea     resolved: 16    Sleep apnea     wears CPAP       Past Surgical History:   Procedure Laterality Date    ABDOMINAL SURGERY          BRAIN HEMATOMA EVACUATION Right 2020    Procedure: Right decompressive craniectomy and evacuation of intraparenchymal hematoma; Surgeon: Rell Archer MD;  Location:  MAIN OR;  Service: Neurosurgery    BREAST SURGERY Right     cyst removal    CARPAL TUNNEL RELEASE Left     CARPAL TUNNEL RELEASE Right     CATARACT EXTRACTION       SECTION  1960    x1    COLONOSCOPY N/A 2018    Procedure: COLONOSCOPY;  Surgeon: Thaddeus Martinez MD;  Location: [place] [address] GI LAB; Service: Gastroenterology    DILATION AND CURETTAGE OF UTERUS  1967    EYE SURGERY Left 2006    cataracts    HERNIA REPAIR      umbilical hernia    INCISIONAL HERNIA REPAIR      incarcerated    KNEE ARTHROSCOPY Right     IN XCAPSL CTRC RMVL INSJ IO LENS PROSTH W/O ECP Right 3/27/2017    Procedure: EXTRACTION EXTRACAPSULAR CATARACT PHACO INTRAOCULAR LENS (IOL);   Surgeon: Doreen Valladares MD;  Location: [facility] MAIN OR;  Service: Ophthalmology       Current Outpatient Medications   Medication Sig Dispense Refill    amLODIPine (NORVASC) 10 mg tablet Take 1 tablet (10 mg total) by mouth daily 90 tablet 1    atorvastatin (LIPITOR) 40 mg tablet Take 1 tablet (40 mg total) by mouth every evening 90 tablet 1    bumetanide (BUMEX) 2 mg tablet Take 1 tablet (2 mg total) by mouth daily 30 tablet 0    levothyroxine 112 mcg tablet Take 1 tablet (112 mcg total) by mouth daily 90 tablet 1    losartan (COZAAR) 25 mg tablet Take 2 tab in AM and 1 tab in  tablet 3    omeprazole (PriLOSEC) 20 mg delayed release capsule Take 1 capsule (20 mg total) by mouth every morning 90 capsule 1     No current facility-administered medications for this visit  No Known Allergies    Review of Systems   Constitutional: Negative  HENT: Negative  Eyes: Negative  Respiratory: Negative  Cardiovascular: Negative  Gastrointestinal: Negative  Endocrine: Negative  Genitourinary: Negative  Musculoskeletal: Positive for gait problem (off balance)  Skin:        Incision itches, there seems to be a stitch still there  Allergic/Immunologic: Negative  Neurological: Positive for headaches  Hematological: Negative  Psychiatric/Behavioral: Negative  All other systems reviewed and are negative  I reviewed the ROS  Physical Exam   Awake and alert  Power is 5/5  Craniectomy is concave      CT scan of the brain demonstrates near complete resolution of the blood and rested 2 shin of the midline  There is some extra-axial fluid  No content Tiana is studies available currently  There is a small extra-axial collection high on the convexity        Impression: This 58-year-old female who is status post a decompressive craniectomy and evacuation of intraparenchymal hematoma  Imaging studies look excellent  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

## 2020-03-24 NOTE — TELEPHONE ENCOUNTER
Patient was scheduled for Virtual Visit today but her [address] was completed on 3/6/2020  DKO reviewed and stated she would need a more recent [address] for the visit  I advised them and they agreed about the [address] either today or tomorrow if possibly to get her an appointment for Virtual Visit on Thursday  Order will be placed  There was also a concerned for the incision because they state that there is a stitch  We advised a Nurse visit or have PCP look at it  They verbalized understanding

## 2020-03-25 ENCOUNTER — APPOINTMENT (OUTPATIENT)
Dept: PHYSICAL THERAPY | Facility: CLINIC | Age: 80
End: 2020-03-25
Payer: MEDICARE

## 2020-03-25 ENCOUNTER — HOSPITAL ENCOUNTER (OUTPATIENT)
Dept: CT IMAGING | Facility: HOSPITAL | Age: 80
Discharge: HOME | End: 2020-03-25
Attending: NEUROLOGICAL SURGERY
Payer: MEDICARE

## 2020-03-25 DIAGNOSIS — I61.9 INTRAPARENCHYMAL HEMORRHAGE OF BRAIN (HCC): ICD-10-CM

## 2020-03-25 DIAGNOSIS — Z98.890 S/P CRANIOTOMY: ICD-10-CM

## 2020-03-25 PROCEDURE — 70450 CT HEAD/BRAIN W/O DYE: CPT

## 2020-03-26 ENCOUNTER — APPOINTMENT (OUTPATIENT)
Dept: OCCUPATIONAL THERAPY | Facility: CLINIC | Age: 80
End: 2020-03-26
Payer: MEDICARE

## 2020-03-26 ENCOUNTER — OFFICE VISIT (OUTPATIENT)
Dept: NEUROSURGERY | Facility: CLINIC | Age: 80
End: 2020-03-26
Payer: MEDICARE

## 2020-03-26 ENCOUNTER — APPOINTMENT (OUTPATIENT)
Dept: SPEECH THERAPY | Facility: CLINIC | Age: 80
End: 2020-03-26
Payer: MEDICARE

## 2020-03-26 VITALS
SYSTOLIC BLOOD PRESSURE: 144 MMHG | HEIGHT: 61 IN | RESPIRATION RATE: 16 BRPM | TEMPERATURE: 97.6 F | HEART RATE: 86 BPM | BODY MASS INDEX: 36.63 KG/M2 | WEIGHT: 194 LBS | DIASTOLIC BLOOD PRESSURE: 70 MMHG

## 2020-03-26 DIAGNOSIS — Z51.81 ADMISSION FOR LONG-TERM (CURRENT) USE OF ANTICOAGULANTS: ICD-10-CM

## 2020-03-26 DIAGNOSIS — Z79.01 ADMISSION FOR LONG-TERM (CURRENT) USE OF ANTICOAGULANTS: ICD-10-CM

## 2020-03-26 DIAGNOSIS — Z98.890 HISTORY OF CRANIOPLASTY: Primary | ICD-10-CM

## 2020-03-26 DIAGNOSIS — Z79.899 ENCOUNTER FOR LONG-TERM (CURRENT) USE OF MEDICATIONS: ICD-10-CM

## 2020-03-26 PROCEDURE — 99214 OFFICE O/P EST MOD 30 MIN: CPT | Performed by: PHYSICIAN ASSISTANT

## 2020-03-26 PROCEDURE — 3008F BODY MASS INDEX DOCD: CPT | Performed by: PHYSICIAN ASSISTANT

## 2020-03-26 PROCEDURE — 1160F RVW MEDS BY RX/DR IN RCRD: CPT | Performed by: PHYSICIAN ASSISTANT

## 2020-03-26 PROCEDURE — 3066F NEPHROPATHY DOC TX: CPT | Performed by: PHYSICIAN ASSISTANT

## 2020-03-26 PROCEDURE — 1111F DSCHRG MED/CURRENT MED MERGE: CPT | Performed by: PHYSICIAN ASSISTANT

## 2020-03-26 PROCEDURE — 3078F DIAST BP <80 MM HG: CPT | Performed by: PHYSICIAN ASSISTANT

## 2020-03-26 PROCEDURE — 4040F PNEUMOC VAC/ADMIN/RCVD: CPT | Performed by: PHYSICIAN ASSISTANT

## 2020-03-26 PROCEDURE — 3077F SYST BP >= 140 MM HG: CPT | Performed by: PHYSICIAN ASSISTANT

## 2020-03-26 RX ORDER — CHLORHEXIDINE GLUCONATE 0.12 MG/ML
15 RINSE ORAL ONCE
Status: CANCELLED | OUTPATIENT
Start: 2020-03-26 | End: 2020-03-26

## 2020-03-26 RX ORDER — CEFAZOLIN SODIUM 1 G/50ML
1000 SOLUTION INTRAVENOUS ONCE
Status: CANCELLED | OUTPATIENT
Start: 2020-03-26 | End: 2020-03-26

## 2020-03-26 NOTE — PATIENT INSTRUCTIONS
Neurosurgery  instructions :     [facility] You will have surgery scheduled to replace back the bone in your head that was taken out   A week prior to your scheduled surgery, do not take any blood thinning medications (ie  No Advil  No motrin  No ibuprofen  No Aleve  No Aspirin  No fishoil  No heparin  No antiplatelet / no anticoagulation medication)    Continue safety precautions and continues use of the helmet until you have surgery  Refrain from activity that increases chance of trauma to head or falls   No strenuous activity or sports   Return to hospital Emergency Room if you experience worsening / new headache, nausea/vomiting, speech/vision change, seizure, confusion / mental status change, weakness, or other neurological changes  Our office will call you for further directions about your surgery  Please call our office at [phone] if you have any questions

## 2020-03-26 NOTE — PROGRESS NOTES
Neurosurgery Office Note  Jered Weinberg [de-identified] y o  female MRN: [id number]      Assessment/Plan      Diagnoses and all orders for this visit:    History of cranioplasty  -     Case request operating room: right frontotemporal replacement cranioplasty; Standing  -     Case request operating room: right frontotemporal replacement cranioplasty    Other orders  -     Diet NPO; Sips with meds; Standing  -     Nursing Communication [facility] Interventions Implemented; Standing  -     Nursing Communication Use 2 CHG cloths, have staff wash the entire body (neck down) ; Standing  -     Nursing Communication Swab both nares with Povidone-Iodine solution, EXCLUDE if patient has shellfish/Iodine allergy; Standing  -     chlorhexidine (PERIDEX) 0 12 % oral rinse 15 mL  -     Void on call to OR; Standing  -     Insert peripheral IV; Standing  -     ceFAZolin (ANCEF) IVPB (premix) 1,000 mg        · Pt is approximately 6 weeks s/p Right decompressive craniectomy and evacuation of intraparenchymal hematoma  · Pt had a virtual visit with Dr Gavi Eaton 2 days ago on 3/24/20 and was advised to obtain an updated CT head which she had done yesterday 3/25/20  ·  Pt had reported concern about her right scalp  Incision and was concerned that there may be a stitch that was left there  · Exam revealed GCS 15, AAO X 3, RASCON, strength 4/5 RUE/RLE, LUE/LLE 4+/5, sensation intact to LT X 4, no drift bilaterally  Pt able to mobilize with a walker  · The right frontotemporal incision was examined  Incision was well healed, clean and dry  Using forceps, some dry scabs were removed along the incision which had well healed skin underneath  No sutures or staples were found  · Imaging reviewed personally and by attending  Final results as below  · CT head wo 3/25/20: Stable encephalomalacia and gliosis within the right temporal lobe in this patient with previous history of hemorrhage  No new hemorrhage    · CT head wo 3/9/20: Status post right-sided decompressive hemicraniectomy for a previously identified right parietotemporal lobe intraparenchymal hematoma  Interval evolution and resolution of the previously seen intraparenchymal hemorrhage with residual edema remaining  No evidence for new acute intracranial hemorrhage  Interval removal of the previously seen drainage catheters  Thin subgaleal and small extra-axial collections are noted    Plan  · Pain and headache management with OTC/Prescribed pain medications as needed  · Discussed recent CT head results with patient which shows stable changes compared to prior CT head  · Discussed with patient and her daughter about the upcoming surgery to place back the right bone flap  Patient and her daughter were agreeable to surgery  Case request placed by Dr Kirstin Santana for surgery for Right Frontotemporal replacement Cranioplasty  · Discussed with patient and her daughter to call with any questions or concerns  CHIEF COMPLAINT    Chief Complaint   Patient presents with    Post-op       HISTORY    History of Present Illness     [de-identified]y o  year old female     HPI    Ms Annamaria Krabbe is a pleasant [de-identified] year female who had presented to the ED with HA, nausea and hypertension who was found to have a large right Intraparenchymal hemorrhage  Pt is approximately 6 weeks s/p Right decompressive craniectomy and evacuation of intraparenchymal hematoma  Pt had a virtual visit with Dr Kirstin Santana 2 days ago on 3/24/20 and was advised to obtain an updated CT head which she had done yesterday  Pt had reported concern about her right scalp  Incision and was concerned that there may be a stitch that was left there  Pt reports that at times she gets itchy around the incision  At this time, pt reports she has markedly improved neurologically since 6 week ago  She reports she has some mild weakness on the right side but reports she is able to ambulate with a walker which she had with her on this visit   Pt also reports she gets mild tremors on the right hand  Pt reports occasional headaches  Pt reports she continues to use her helmet and had the helmet with her during this visit  Patient's daughter reported that she has about 4 more weeks of leave from work and was hoping that her mother could get surgery during that time so she can take care of her after surgery  REVIEW OF SYSTEMS    Review of Systems   Constitutional: Negative  HENT: Negative  Eyes: Positive for visual disturbance (blurry vision)  Respiratory: Negative  Cardiovascular: Negative  Gastrointestinal: Negative  Endocrine: Negative  Genitourinary: Negative  Musculoskeletal: Positive for gait problem (doing good, using walker) and myalgias (right leg pain)  Negative for back pain and neck pain  Skin: Negative  Allergic/Immunologic: Negative  Neurological: Positive for tremors (right hand), weakness (on her right side) and headaches (everyday )  Negative for dizziness, seizures, speech difficulty, light-headedness and numbness  Hematological: Negative  Psychiatric/Behavioral: Negative  Meds/Allergies     Current Outpatient Medications   Medication Sig Dispense Refill    amLODIPine (NORVASC) 10 mg tablet Take 1 tablet (10 mg total) by mouth daily 90 tablet 1    atorvastatin (LIPITOR) 40 mg tablet Take 1 tablet (40 mg total) by mouth every evening 90 tablet 1    bumetanide (BUMEX) 2 mg tablet Take 1 tablet (2 mg total) by mouth daily 30 tablet 0    levothyroxine 112 mcg tablet Take 1 tablet (112 mcg total) by mouth daily 90 tablet 1    losartan (COZAAR) 25 mg tablet Take 2 tab in AM and 1 tab in  tablet 3    omeprazole (PriLOSEC) 20 mg delayed release capsule Take 1 capsule (20 mg total) by mouth every morning 90 capsule 1     No current facility-administered medications for this visit          No Known Allergies    PAST HISTORY    Past Medical History:   Diagnosis Date    Anxiety     Arthritis     joints    Cataract     Disease of thyroid gland     Eczema     GERD (gastroesophageal reflux disease)     Gout     Heart failure (HCC)     Hepatitis     Hiatal hernia     Hypertension     Onychomycosis     last assessed: 16    Orthopnea     resolved: 16    Sleep apnea     wears CPAP       Past Surgical History:   Procedure Laterality Date    ABDOMINAL SURGERY          BRAIN HEMATOMA EVACUATION Right 2020    Procedure: Right decompressive craniectomy and evacuation of intraparenchymal hematoma; Surgeon: Rosalee Berger MD;  Location:  MAIN OR;  Service: Neurosurgery    BREAST SURGERY Right     cyst removal    CARPAL TUNNEL RELEASE Left     CARPAL TUNNEL RELEASE Right     CATARACT EXTRACTION       SECTION  1960    x1    COLONOSCOPY N/A 2018    Procedure: COLONOSCOPY;  Surgeon: Yamileth Amor MD;  Location: [place] [address] GI LAB; Service: Gastroenterology    DILATION AND CURETTAGE OF UTERUS  1967    EYE SURGERY Left 2006    cataracts    HERNIA REPAIR      umbilical hernia    INCISIONAL HERNIA REPAIR      incarcerated    KNEE ARTHROSCOPY Right     TX XCAPSL CTRC RMVL INSJ IO LENS PROSTH W/O ECP Right 3/27/2017    Procedure: EXTRACTION EXTRACAPSULAR CATARACT PHACO INTRAOCULAR LENS (IOL); Surgeon: Ozzy Garcia MD;  Location: [facility] MAIN OR;  Service: Ophthalmology       Social History     Tobacco Use    Smoking status: Never Smoker    Smokeless tobacco: Never Used   Substance Use Topics    Alcohol use: Not Currently     Alcohol/week: 0 0 standard drinks     Frequency: Never     Drinks per session: Patient refused     Binge frequency: Never     Comment: 0    Drug use: Never       Family History   Problem Relation Age of Onset    Diabetes Mother     Coronary artery disease Mother     Arthritis Mother     Hypertension Mother     Hypertension Father     Diabetes Brother     Diabetes Son     Arthritis Family          Above history personally reviewed  EXAM    Vitals:Blood pressure 144/70, pulse 86, temperature 97 6 °F (36 4 °C), temperature source Tympanic, resp  rate 16, height 5' 1" (1 549 m), weight 88 kg (194 lb), not currently breastfeeding  ,Body mass index is 36 66 kg/m²  Physical Exam   Constitutional: She is oriented to person, place, and time  She appears well-developed and well-nourished  No distress  HENT:   Right frontotemporal scalp incision is well healed, clean and dry  Had a few dry scabs that came off revealing well healed incision underneath  Pt had her helmet placed back on when she left the office  Eyes: Pupils are equal, round, and reactive to light  EOM are normal    Neck: Neck supple  No tracheal deviation present  Cardiovascular: Normal rate  Pulmonary/Chest: Effort normal    Abdominal: Soft  There is no tenderness  There is no guarding  Musculoskeletal: She exhibits no edema  Pt mobilized with her walker  Neurological: She is alert and oriented to person, place, and time  GCS 15, AAO X 3, RASCON, strength 4/5 RUE/RLE, LUE/LLE 4+/5, sensation intact to LT X 4, no drift bilaterally   Skin: Skin is warm and dry  No rash noted  No pallor  Psychiatric: She has a normal mood and affect  Her behavior is normal        Neurologic Exam     Mental Status   Oriented to person, place, and time  Cranial Nerves     CN III, IV, VI   Pupils are equal, round, and reactive to light  Extraocular motions are normal          MEDICAL DECISION MAKING    Imaging Studies:     Ct Head Without Contrast    Result Date: 3/25/2020  Narrative: CT BRAIN - WITHOUT CONTRAST INDICATION:   I61 9: Nontraumatic intracerebral hemorrhage, unspecified Z98 890: Other specified postprocedural states  COMPARISON:  3/6/2020 is the most recent of multiple prior exams  TECHNIQUE:  CT examination of the brain was performed  In addition to axial images, coronal 2D reformatted images were created and submitted for interpretation    Radiation dose length product (DLP) for this visit:  880 mGy-cm   This examination, like all CT scans performed in the [facility], was performed utilizing techniques to minimize radiation dose exposure, including the use of iterative reconstruction and automated exposure control  IMAGE QUALITY:  Diagnostic  FINDINGS: PARENCHYMA:  Stable appearance of low density within the right lateral temporal lobe extending into the posterior superior aspect of the temporal lobe with mild ex vacuo dilatation of the adjacent temporal horn of the lateral ventricle  Patient has undergone previous overlying craniectomy  This corresponds to the site of previous hemorrhage identified on CT dated 2/11/2020  No mass, mass effect or midline shift  No extra-axial fluid collections  No hemorrhage  No signs of acute territorial infarction  Brainstem and cerebellum demonstrate normal density  VENTRICLES AND EXTRA-AXIAL SPACES:  No obstructive hydrocephalus  Stable mild ex vacuo dilatation of the right lateral ventricle  VISUALIZED ORBITS AND PARANASAL SINUSES:  Unremarkable  CALVARIUM AND EXTRACRANIAL SOFT TISSUES:  Stable right hemispheric craniectomy  Impression: Stable encephalomalacia and gliosis within the right temporal lobe in this patient with previous history of hemorrhage  No new hemorrhage  Workstation performed: [id number]     Ct Head Wo Contrast    Result Date: 3/6/2020  Narrative: CT BRAIN - WITHOUT CONTRAST INDICATION:   I61 9: Nontraumatic intracerebral hemorrhage, unspecified  COMPARISON:  2/24/2020 TECHNIQUE:  CT examination of the brain was performed  In addition to axial images, coronal 2D reformatted images were created and submitted for interpretation  Radiation dose length product (DLP) for this visit:  847 89 mGy-cm     This examination, like all CT scans performed in the [facility], was performed utilizing techniques to minimize radiation dose exposure, including the use of iterative reconstruction and automated exposure control  IMAGE QUALITY:  Diagnostic  FINDINGS: PARENCHYMA:  There is a low-density area seen within the right temporal lobe which is presumably either some residual edema or encephalomalacia from the prior area of hemorrhage  Currently, there is no parenchymal hemorrhage identified  There is no parenchymal mass identified  There is no midline shift  There is no evidence of acute large vessel territorial infarction  Overall, the brain appears improved from prior  VENTRICLES AND EXTRA-AXIAL SPACES:  There is a persistent low density fluid collection seen around a portion of the right hemisphere  On the coronal reformatted images, it seems that this fluid is partially in the subgaleal location and perhaps partly subdural location  There is no evidence of acute hemorrhage  There is no significant mass effect on the brain  There is no subarachnoid blood  There is no hydrocephalus  VISUALIZED ORBITS AND PARANASAL SINUSES:  Unremarkable  CALVARIUM AND EXTRACRANIAL SOFT TISSUES:  The right sided craniectomy defect appears satisfactory  No change  Impression: Improved appearance of the brain with diminished hypodensity in keeping with resolving edema  There may be some encephalomalacia from the prior hemorrhage  There is no evidence of acute blood within the parenchyma or in the extra-axial space  Low-density fluid collection along the right side of the head seems to probably be a combination of subdural and subgaleal fluid, similar to prior  No significant mass effect or midline shift  Workstation performed: [id number]       I have personally reviewed pertinent reports     and I have personally reviewed pertinent films in PACS

## 2020-03-27 ENCOUNTER — APPOINTMENT (OUTPATIENT)
Dept: PHYSICAL THERAPY | Facility: CLINIC | Age: 80
End: 2020-03-27
Payer: MEDICARE

## 2020-03-27 ENCOUNTER — DOCUMENTATION (OUTPATIENT)
Dept: NEPHROLOGY | Facility: CLINIC | Age: 80
End: 2020-03-27

## 2020-03-27 ENCOUNTER — LAB (OUTPATIENT)
Dept: LAB | Facility: CLINIC | Age: 80
End: 2020-03-27
Payer: MEDICARE

## 2020-03-27 DIAGNOSIS — Z98.890 HISTORY OF CRANIOPLASTY: ICD-10-CM

## 2020-03-27 DIAGNOSIS — I10 BENIGN ESSENTIAL HTN: ICD-10-CM

## 2020-03-27 DIAGNOSIS — N18.30 CKD (CHRONIC KIDNEY DISEASE), STAGE III (HCC): ICD-10-CM

## 2020-03-27 DIAGNOSIS — Z79.899 ENCOUNTER FOR LONG-TERM (CURRENT) USE OF MEDICATIONS: ICD-10-CM

## 2020-03-27 DIAGNOSIS — Z51.81 ADMISSION FOR LONG-TERM (CURRENT) USE OF ANTICOAGULANTS: ICD-10-CM

## 2020-03-27 DIAGNOSIS — Z79.01 ADMISSION FOR LONG-TERM (CURRENT) USE OF ANTICOAGULANTS: ICD-10-CM

## 2020-03-27 LAB
ABO GROUP BLD: NORMAL
ALBUMIN SERPL BCP-MCNC: 3.3 G/DL (ref 3.5–5)
ALP SERPL-CCNC: 137 U/L (ref 46–116)
ALT SERPL W P-5'-P-CCNC: 20 U/L (ref 12–78)
ANION GAP SERPL CALCULATED.3IONS-SCNC: 4 MMOL/L (ref 4–13)
APTT PPP: 31 SECONDS (ref 23–37)
AST SERPL W P-5'-P-CCNC: 11 U/L (ref 5–45)
BACTERIA UR QL AUTO: ABNORMAL /HPF
BASOPHILS # BLD AUTO: 0.04 THOUSANDS/ΜL (ref 0–0.1)
BASOPHILS NFR BLD AUTO: 1 % (ref 0–1)
BILIRUB SERPL-MCNC: 0.41 MG/DL (ref 0.2–1)
BILIRUB UR QL STRIP: NEGATIVE
BLD GP AB SCN SERPL QL: NEGATIVE
BUN SERPL-MCNC: 23 MG/DL (ref 5–25)
CALCIUM SERPL-MCNC: 9.3 MG/DL (ref 8.3–10.1)
CHLORIDE SERPL-SCNC: 106 MMOL/L (ref 100–108)
CLARITY UR: ABNORMAL
CO2 SERPL-SCNC: 31 MMOL/L (ref 21–32)
COLOR UR: YELLOW
CREAT SERPL-MCNC: 1 MG/DL (ref 0.6–1.3)
CREAT UR-MCNC: 159 MG/DL
EOSINOPHIL # BLD AUTO: 0.19 THOUSAND/ΜL (ref 0–0.61)
EOSINOPHIL NFR BLD AUTO: 3 % (ref 0–6)
ERYTHROCYTE [DISTWIDTH] IN BLOOD BY AUTOMATED COUNT: 15.2 % (ref 11.6–15.1)
EST. AVERAGE GLUCOSE BLD GHB EST-MCNC: 114 MG/DL
GFR SERPL CREATININE-BSD FRML MDRD: 53 ML/MIN/1.73SQ M
GLUCOSE P FAST SERPL-MCNC: 107 MG/DL (ref 65–99)
GLUCOSE UR STRIP-MCNC: NEGATIVE MG/DL
HBA1C MFR BLD: 5.6 %
HCT VFR BLD AUTO: 37.5 % (ref 34.8–46.1)
HGB BLD-MCNC: 11.6 G/DL (ref 11.5–15.4)
HGB UR QL STRIP.AUTO: NEGATIVE
HYALINE CASTS #/AREA URNS LPF: ABNORMAL /LPF
IMM GRANULOCYTES # BLD AUTO: 0.03 THOUSAND/UL (ref 0–0.2)
IMM GRANULOCYTES NFR BLD AUTO: 0 % (ref 0–2)
INR PPP: 1.11 (ref 0.84–1.19)
KETONES UR STRIP-MCNC: NEGATIVE MG/DL
LEUKOCYTE ESTERASE UR QL STRIP: ABNORMAL
LYMPHOCYTES # BLD AUTO: 1.37 THOUSANDS/ΜL (ref 0.6–4.47)
LYMPHOCYTES NFR BLD AUTO: 19 % (ref 14–44)
MAGNESIUM SERPL-MCNC: 1.7 MG/DL (ref 1.6–2.6)
MCH RBC QN AUTO: 27.4 PG (ref 26.8–34.3)
MCHC RBC AUTO-ENTMCNC: 30.9 G/DL (ref 31.4–37.4)
MCV RBC AUTO: 89 FL (ref 82–98)
MONOCYTES # BLD AUTO: 0.61 THOUSAND/ΜL (ref 0.17–1.22)
MONOCYTES NFR BLD AUTO: 9 % (ref 4–12)
NEUTROPHILS # BLD AUTO: 4.89 THOUSANDS/ΜL (ref 1.85–7.62)
NEUTS SEG NFR BLD AUTO: 68 % (ref 43–75)
NITRITE UR QL STRIP: NEGATIVE
NON-SQ EPI CELLS URNS QL MICRO: ABNORMAL /HPF
NRBC BLD AUTO-RTO: 0 /100 WBCS
PH UR STRIP.AUTO: 5.5 [PH]
PHOSPHATE SERPL-MCNC: 3.4 MG/DL (ref 2.3–4.1)
PLATELET # BLD AUTO: 183 THOUSANDS/UL (ref 149–390)
PMV BLD AUTO: 11.6 FL (ref 8.9–12.7)
POTASSIUM SERPL-SCNC: 3.6 MMOL/L (ref 3.5–5.3)
PROT SERPL-MCNC: 6.9 G/DL (ref 6.4–8.2)
PROT UR STRIP-MCNC: NEGATIVE MG/DL
PROT UR-MCNC: 14 MG/DL
PROT/CREAT UR: 0.09 MG/G{CREAT} (ref 0–0.1)
PROTHROMBIN TIME: 13.9 SECONDS (ref 11.6–14.5)
RBC # BLD AUTO: 4.23 MILLION/UL (ref 3.81–5.12)
RBC #/AREA URNS AUTO: ABNORMAL /HPF
RH BLD: POSITIVE
SODIUM SERPL-SCNC: 141 MMOL/L (ref 136–145)
SP GR UR STRIP.AUTO: 1.02 (ref 1–1.03)
SPECIMEN EXPIRATION DATE: NORMAL
UROBILINOGEN UR QL STRIP.AUTO: 0.2 E.U./DL
WBC # BLD AUTO: 7.13 THOUSAND/UL (ref 4.31–10.16)
WBC #/AREA URNS AUTO: ABNORMAL /HPF

## 2020-03-27 PROCEDURE — 85025 COMPLETE CBC W/AUTO DIFF WBC: CPT

## 2020-03-27 PROCEDURE — 80053 COMPREHEN METABOLIC PANEL: CPT

## 2020-03-27 PROCEDURE — 84156 ASSAY OF PROTEIN URINE: CPT

## 2020-03-27 PROCEDURE — 85730 THROMBOPLASTIN TIME PARTIAL: CPT

## 2020-03-27 PROCEDURE — 86900 BLOOD TYPING SEROLOGIC ABO: CPT

## 2020-03-27 PROCEDURE — 83735 ASSAY OF MAGNESIUM: CPT

## 2020-03-27 PROCEDURE — 84100 ASSAY OF PHOSPHORUS: CPT

## 2020-03-27 PROCEDURE — 81001 URINALYSIS AUTO W/SCOPE: CPT | Performed by: PHYSICIAN ASSISTANT

## 2020-03-27 PROCEDURE — 86901 BLOOD TYPING SEROLOGIC RH(D): CPT

## 2020-03-27 PROCEDURE — 36415 COLL VENOUS BLD VENIPUNCTURE: CPT

## 2020-03-27 PROCEDURE — 86850 RBC ANTIBODY SCREEN: CPT

## 2020-03-27 PROCEDURE — 83036 HEMOGLOBIN GLYCOSYLATED A1C: CPT

## 2020-03-27 PROCEDURE — 85610 PROTHROMBIN TIME: CPT

## 2020-03-27 PROCEDURE — 87086 URINE CULTURE/COLONY COUNT: CPT | Performed by: PHYSICIAN ASSISTANT

## 2020-03-27 PROCEDURE — 82570 ASSAY OF URINE CREATININE: CPT

## 2020-03-27 NOTE — RESULT ENCOUNTER NOTE
Hello    Patient normally is followed up by Ms Alvin Pritchard  Please let pt know that Cr stable  - what have patient's BP been last 2 weeks   Please send me recordings    Thank you    np

## 2020-03-27 NOTE — PROGRESS NOTES
Losartan 25 mg tid approved by [facility] on 3/27/20, Auth # [id number]

## 2020-03-28 LAB — BACTERIA UR CULT: NORMAL

## 2020-03-30 ENCOUNTER — APPOINTMENT (OUTPATIENT)
Dept: OCCUPATIONAL THERAPY | Facility: CLINIC | Age: 80
End: 2020-03-30
Payer: MEDICARE

## 2020-03-30 ENCOUNTER — TELEPHONE (OUTPATIENT)
Dept: NEPHROLOGY | Facility: CLINIC | Age: 80
End: 2020-03-30

## 2020-03-30 ENCOUNTER — TELEPHONE (OUTPATIENT)
Dept: PHYSICAL THERAPY | Facility: CLINIC | Age: 80
End: 2020-03-30

## 2020-03-30 ENCOUNTER — TELEPHONE (OUTPATIENT)
Dept: OCCUPATIONAL THERAPY | Facility: CLINIC | Age: 80
End: 2020-03-30

## 2020-03-30 ENCOUNTER — TELEPHONE (OUTPATIENT)
Dept: FAMILY MEDICINE CLINIC | Facility: CLINIC | Age: 80
End: 2020-03-30

## 2020-03-30 ENCOUNTER — APPOINTMENT (OUTPATIENT)
Dept: SPEECH THERAPY | Facility: CLINIC | Age: 80
End: 2020-03-30
Payer: MEDICARE

## 2020-03-30 NOTE — TELEPHONE ENCOUNTER
Pt's son and daughter in law made aware of the above    Bp readings as follow:   3/25 -120/59 am            171/65 90  3/26 - 164/66  3/27 - 156/64 am            147/66 pm  3/28 - 137/67 am             178/55 pm  3/29 -  160/56  3/30 -  147/57 am

## 2020-03-30 NOTE — TELEPHONE ENCOUNTER
----- Message from Bony Leon MD sent at 3/27/2020  6:29 PM EDT -----  Hello    Patient normally is followed up by Ms Navjot Dumont  Please let pt know that Cr stable  - what have patient's BP been last 2 weeks   Please send me recordings    Thank you    np

## 2020-03-30 NOTE — TELEPHONE ENCOUNTER
Ledy Martin from [address] [place] [address] NeuroSurgery called in to ask Dr Christopher Flanagan if this patient needs another OV for clearance? She is scheduled to have a right frontotemporal replacement cranioplasty on 04/06/2020    The patient was just seen on 03/13/2020  Please call Migdalia back

## 2020-03-30 NOTE — LETTER
March 31, 2020     Mary Escobar    Patient: Mary Escobar   YOB: 1940   Date of Visit: 3/30/2020       Dear Dr Brenda Magaña: Thank you for referring Flavia Sood to me for pre-op clearance  Patient is following up with nephrology for bp monitoring  Patient will need clearance from the nephrologist   Patient also see cardiology for congestive cardiac failure  She will also require clearance from cardiology  If you have any further questions or concerns please feel free to contact my office  Sincerely,        MD Senia Mir MD  3/31/2020 10:51 AM  Signed  Does not need an OV with us  Per her chart, she has been calling nephrology re elevated BP values  Will need nephrology and cardiology clearance

## 2020-03-30 NOTE — TELEPHONE ENCOUNTER
PT educated patient on use of e-visit to progress exercises at home and she was unsure if she wanted to partake  PT suggested follow up phone call later in the week to see if she is interested and she agreed

## 2020-03-31 ENCOUNTER — ANESTHESIA EVENT (OUTPATIENT)
Dept: PERIOP | Facility: HOSPITAL | Age: 80
DRG: 982 | End: 2020-03-31
Payer: MEDICARE

## 2020-03-31 ENCOUNTER — TELEPHONE (OUTPATIENT)
Dept: NEPHROLOGY | Facility: CLINIC | Age: 80
End: 2020-03-31

## 2020-03-31 ENCOUNTER — DOCUMENTATION (OUTPATIENT)
Dept: NEPHROLOGY | Facility: CLINIC | Age: 80
End: 2020-03-31

## 2020-03-31 ENCOUNTER — APPOINTMENT (OUTPATIENT)
Dept: PHYSICAL THERAPY | Facility: CLINIC | Age: 80
End: 2020-03-31
Payer: MEDICARE

## 2020-03-31 NOTE — TELEPHONE ENCOUNTER
Does not need an OV with us  Per her chart, she has been calling nephrology re elevated BP values  Will need nephrology and cardiology clearance

## 2020-03-31 NOTE — TELEPHONE ENCOUNTER
Hello    Please have pt increase losartan to 50 mg twice daily   She is on 50 mg in AM and 25 mg in PM    Please have pt send in BP readings on Friday or Monday at latest    Thank you    np

## 2020-03-31 NOTE — TELEPHONE ENCOUNTER
I have personally discussed the risks and benefits of the surgery from a renal standpoint with the patient's son in depth over phone, and advised the patient about the risk of SHABANA and the course of SHABANA  Patient's son voiced understanding   From a renal standpoint the patient is renally optimized  for surgery with the following recommendations:   Fluids to administer: Normal Saline 250 cc total over 2 hours    Medication Recommendations:   Minimize any IV contrast use (If IV contrast is used, please check BMP POD # 1)   Hold NSAIDs for at least 10 days prior to surgery   Hold ACEi/ARB on the day of surgery/procedure and can restart post op day 0 or 1 depending on pt hemodynamic status   Hold bumetanide on the day of surgery/procedure   Hemodynamic Recommendations:   Ideally, target MAPs greater than 65 mmHg in the perioperative period, and minimize operative time with MAPs < 65 mmHg   Avoid intraop hemodynamic instability to decrease risk for SHABANA occurrence     Other Recommendations:   Please consult the Nephrology team when the patient is admitted, Please check a BMP POD day # 1 and call if any questions or if Creatinine is rising or electrolyte abnormalities present and Please have the patient follow up with the Nephrology office 1-2 weeks post discharge

## 2020-04-01 ENCOUNTER — OFFICE VISIT (OUTPATIENT)
Dept: CARDIOLOGY CLINIC | Facility: CLINIC | Age: 80
End: 2020-04-01
Payer: MEDICARE

## 2020-04-01 ENCOUNTER — TELEMEDICINE (OUTPATIENT)
Dept: OCCUPATIONAL THERAPY | Facility: CLINIC | Age: 80
End: 2020-04-01
Payer: MEDICARE

## 2020-04-01 VITALS
HEIGHT: 61 IN | OXYGEN SATURATION: 97 % | DIASTOLIC BLOOD PRESSURE: 60 MMHG | BODY MASS INDEX: 39.27 KG/M2 | SYSTOLIC BLOOD PRESSURE: 142 MMHG | WEIGHT: 208 LBS | HEART RATE: 77 BPM

## 2020-04-01 DIAGNOSIS — I61.9 INTRAPARENCHYMAL HEMORRHAGE OF BRAIN (HCC): Primary | ICD-10-CM

## 2020-04-01 DIAGNOSIS — R06.00 EXERTIONAL DYSPNEA: ICD-10-CM

## 2020-04-01 DIAGNOSIS — I10 BENIGN ESSENTIAL HYPERTENSION: Chronic | ICD-10-CM

## 2020-04-01 DIAGNOSIS — I50.32 CHRONIC DIASTOLIC (CONGESTIVE) HEART FAILURE (HCC): ICD-10-CM

## 2020-04-01 DIAGNOSIS — I34.0 MODERATE TO SEVERE MITRAL REGURGITATION: Primary | ICD-10-CM

## 2020-04-01 DIAGNOSIS — I50.33 ACUTE ON CHRONIC DIASTOLIC CHF (CONGESTIVE HEART FAILURE) (HCC): ICD-10-CM

## 2020-04-01 PROCEDURE — 4040F PNEUMOC VAC/ADMIN/RCVD: CPT | Performed by: INTERNAL MEDICINE

## 2020-04-01 PROCEDURE — 3044F HG A1C LEVEL LT 7.0%: CPT | Performed by: INTERNAL MEDICINE

## 2020-04-01 PROCEDURE — 99214 OFFICE O/P EST MOD 30 MIN: CPT | Performed by: INTERNAL MEDICINE

## 2020-04-01 PROCEDURE — 3066F NEPHROPATHY DOC TX: CPT | Performed by: INTERNAL MEDICINE

## 2020-04-01 PROCEDURE — 1160F RVW MEDS BY RX/DR IN RCRD: CPT | Performed by: INTERNAL MEDICINE

## 2020-04-01 PROCEDURE — 97530 THERAPEUTIC ACTIVITIES: CPT

## 2020-04-01 PROCEDURE — 3008F BODY MASS INDEX DOCD: CPT | Performed by: INTERNAL MEDICINE

## 2020-04-01 PROCEDURE — 3078F DIAST BP <80 MM HG: CPT | Performed by: INTERNAL MEDICINE

## 2020-04-01 PROCEDURE — 93000 ELECTROCARDIOGRAM COMPLETE: CPT | Performed by: INTERNAL MEDICINE

## 2020-04-01 PROCEDURE — 1111F DSCHRG MED/CURRENT MED MERGE: CPT | Performed by: INTERNAL MEDICINE

## 2020-04-01 PROCEDURE — 1036F TOBACCO NON-USER: CPT | Performed by: INTERNAL MEDICINE

## 2020-04-01 PROCEDURE — 3077F SYST BP >= 140 MM HG: CPT | Performed by: INTERNAL MEDICINE

## 2020-04-03 ENCOUNTER — TELEPHONE (OUTPATIENT)
Dept: NEPHROLOGY | Facility: CLINIC | Age: 80
End: 2020-04-03

## 2020-04-03 ENCOUNTER — DOCUMENTATION (OUTPATIENT)
Dept: NEUROSURGERY | Facility: CLINIC | Age: 80
End: 2020-04-03

## 2020-04-03 DIAGNOSIS — I10 BENIGN ESSENTIAL HTN: Primary | ICD-10-CM

## 2020-04-03 RX ORDER — SPIRONOLACTONE 25 MG/1
12.5 TABLET ORAL DAILY
Qty: 45 TABLET | Refills: 3 | Status: SHIPPED | OUTPATIENT
Start: 2020-04-03 | End: 2020-04-22

## 2020-04-06 ENCOUNTER — APPOINTMENT (OUTPATIENT)
Dept: RADIOLOGY | Facility: HOSPITAL | Age: 80
DRG: 982 | End: 2020-04-06
Payer: MEDICARE

## 2020-04-06 ENCOUNTER — ANESTHESIA (OUTPATIENT)
Dept: PERIOP | Facility: HOSPITAL | Age: 80
DRG: 982 | End: 2020-04-06
Payer: MEDICARE

## 2020-04-06 ENCOUNTER — APPOINTMENT (OUTPATIENT)
Dept: OCCUPATIONAL THERAPY | Facility: CLINIC | Age: 80
End: 2020-04-06
Payer: MEDICARE

## 2020-04-06 ENCOUNTER — HOSPITAL ENCOUNTER (INPATIENT)
Facility: HOSPITAL | Age: 80
LOS: 2 days | DRG: 982 | End: 2020-04-08
Attending: NEUROLOGICAL SURGERY | Admitting: NEUROLOGICAL SURGERY
Payer: MEDICARE

## 2020-04-06 DIAGNOSIS — N18.30 CKD (CHRONIC KIDNEY DISEASE), STAGE III (HCC): Primary | Chronic | ICD-10-CM

## 2020-04-06 DIAGNOSIS — Z98.890 STATUS POST CRANIECTOMY: ICD-10-CM

## 2020-04-06 PROCEDURE — 62143 RPL B1 FLP/PROSTC PLATE SKL: CPT | Performed by: NEUROLOGICAL SURGERY

## 2020-04-06 PROCEDURE — 009600Z DRAINAGE OF CEREBRAL VENTRICLE WITH DRAINAGE DEVICE, OPEN APPROACH: ICD-10-PCS | Performed by: NEUROLOGICAL SURGERY

## 2020-04-06 PROCEDURE — 99223 1ST HOSP IP/OBS HIGH 75: CPT | Performed by: STUDENT IN AN ORGANIZED HEALTH CARE EDUCATION/TRAINING PROGRAM

## 2020-04-06 PROCEDURE — 93005 ELECTROCARDIOGRAM TRACING: CPT

## 2020-04-06 PROCEDURE — 70250 X-RAY EXAM OF SKULL: CPT

## 2020-04-06 PROCEDURE — 0NS50ZZ REPOSITION RIGHT TEMPORAL BONE, OPEN APPROACH: ICD-10-PCS | Performed by: NEUROLOGICAL SURGERY

## 2020-04-06 PROCEDURE — C1713 ANCHOR/SCREW BN/BN,TIS/BN: HCPCS | Performed by: NEUROLOGICAL SURGERY

## 2020-04-06 PROCEDURE — 0NS10ZZ REPOSITION FRONTAL BONE, OPEN APPROACH: ICD-10-PCS | Performed by: NEUROLOGICAL SURGERY

## 2020-04-06 DEVICE — IMPLANTABLE DEVICE
Type: IMPLANTABLE DEVICE | Site: CRANIAL | Status: FUNCTIONAL
Brand: THINFLAP SYSTEM

## 2020-04-06 DEVICE — BONE FLAP: Type: IMPLANTABLE DEVICE | Site: CRANIAL | Status: FUNCTIONAL

## 2020-04-06 RX ORDER — FENTANYL CITRATE 50 UG/ML
25 INJECTION, SOLUTION INTRAMUSCULAR; INTRAVENOUS
Status: DISCONTINUED | OUTPATIENT
Start: 2020-04-06 | End: 2020-04-08 | Stop reason: HOSPADM

## 2020-04-06 RX ORDER — GLYCOPYRROLATE 0.2 MG/ML
INJECTION INTRAMUSCULAR; INTRAVENOUS AS NEEDED
Status: DISCONTINUED | OUTPATIENT
Start: 2020-04-06 | End: 2020-04-06 | Stop reason: SURG

## 2020-04-06 RX ORDER — ROCURONIUM BROMIDE 10 MG/ML
INJECTION, SOLUTION INTRAVENOUS AS NEEDED
Status: DISCONTINUED | OUTPATIENT
Start: 2020-04-06 | End: 2020-04-06 | Stop reason: SURG

## 2020-04-06 RX ORDER — HEPARIN SODIUM 5000 [USP'U]/ML
5000 INJECTION, SOLUTION INTRAVENOUS; SUBCUTANEOUS EVERY 8 HOURS SCHEDULED
Status: DISCONTINUED | OUTPATIENT
Start: 2020-04-07 | End: 2020-04-07

## 2020-04-06 RX ORDER — NEOSTIGMINE METHYLSULFATE 1 MG/ML
INJECTION INTRAVENOUS AS NEEDED
Status: DISCONTINUED | OUTPATIENT
Start: 2020-04-06 | End: 2020-04-06 | Stop reason: SURG

## 2020-04-06 RX ORDER — LEVOTHYROXINE SODIUM 112 UG/1
112 TABLET ORAL
Status: DISCONTINUED | OUTPATIENT
Start: 2020-04-07 | End: 2020-04-08 | Stop reason: HOSPADM

## 2020-04-06 RX ORDER — OXYCODONE HYDROCHLORIDE 10 MG/1
10 TABLET ORAL EVERY 4 HOURS PRN
Status: DISCONTINUED | OUTPATIENT
Start: 2020-04-06 | End: 2020-04-07

## 2020-04-06 RX ORDER — CLINDAMYCIN PHOSPHATE 600 MG/50ML
600 INJECTION INTRAVENOUS EVERY 8 HOURS
Status: COMPLETED | OUTPATIENT
Start: 2020-04-06 | End: 2020-04-07

## 2020-04-06 RX ORDER — CHLORHEXIDINE GLUCONATE 0.12 MG/ML
15 RINSE ORAL ONCE
Status: COMPLETED | OUTPATIENT
Start: 2020-04-06 | End: 2020-04-06

## 2020-04-06 RX ORDER — MAGNESIUM HYDROXIDE 1200 MG/15ML
LIQUID ORAL AS NEEDED
Status: DISCONTINUED | OUTPATIENT
Start: 2020-04-06 | End: 2020-04-06 | Stop reason: HOSPADM

## 2020-04-06 RX ORDER — ONDANSETRON 2 MG/ML
INJECTION INTRAMUSCULAR; INTRAVENOUS AS NEEDED
Status: DISCONTINUED | OUTPATIENT
Start: 2020-04-06 | End: 2020-04-06 | Stop reason: SURG

## 2020-04-06 RX ORDER — BUPIVACAINE HYDROCHLORIDE AND EPINEPHRINE 5; 5 MG/ML; UG/ML
INJECTION, SOLUTION EPIDURAL; INTRACAUDAL; PERINEURAL AS NEEDED
Status: DISCONTINUED | OUTPATIENT
Start: 2020-04-06 | End: 2020-04-06 | Stop reason: HOSPADM

## 2020-04-06 RX ORDER — PANTOPRAZOLE SODIUM 40 MG/1
40 TABLET, DELAYED RELEASE ORAL
Status: DISCONTINUED | OUTPATIENT
Start: 2020-04-07 | End: 2020-04-08 | Stop reason: HOSPADM

## 2020-04-06 RX ORDER — DOCUSATE SODIUM 100 MG/1
100 CAPSULE, LIQUID FILLED ORAL 2 TIMES DAILY
Status: DISCONTINUED | OUTPATIENT
Start: 2020-04-06 | End: 2020-04-08 | Stop reason: HOSPADM

## 2020-04-06 RX ORDER — SPIRONOLACTONE 25 MG/1
12.5 TABLET ORAL DAILY
Status: DISCONTINUED | OUTPATIENT
Start: 2020-04-06 | End: 2020-04-07

## 2020-04-06 RX ORDER — BISACODYL 10 MG
10 SUPPOSITORY, RECTAL RECTAL DAILY PRN
Status: DISCONTINUED | OUTPATIENT
Start: 2020-04-06 | End: 2020-04-08 | Stop reason: HOSPADM

## 2020-04-06 RX ORDER — OXYCODONE HYDROCHLORIDE 5 MG/1
5 TABLET ORAL EVERY 4 HOURS PRN
Status: DISCONTINUED | OUTPATIENT
Start: 2020-04-06 | End: 2020-04-07

## 2020-04-06 RX ORDER — BUMETANIDE 2 MG/1
2 TABLET ORAL DAILY
Status: DISCONTINUED | OUTPATIENT
Start: 2020-04-06 | End: 2020-04-08 | Stop reason: HOSPADM

## 2020-04-06 RX ORDER — PROPOFOL 10 MG/ML
INJECTION, EMULSION INTRAVENOUS AS NEEDED
Status: DISCONTINUED | OUTPATIENT
Start: 2020-04-06 | End: 2020-04-06 | Stop reason: SURG

## 2020-04-06 RX ORDER — ACETAMINOPHEN 325 MG/1
650 TABLET ORAL EVERY 4 HOURS PRN
Status: DISCONTINUED | OUTPATIENT
Start: 2020-04-06 | End: 2020-04-08 | Stop reason: HOSPADM

## 2020-04-06 RX ORDER — LOSARTAN POTASSIUM 50 MG/1
50 TABLET ORAL 2 TIMES DAILY
Status: DISCONTINUED | OUTPATIENT
Start: 2020-04-06 | End: 2020-04-07

## 2020-04-06 RX ORDER — LIDOCAINE HYDROCHLORIDE AND EPINEPHRINE 10; 10 MG/ML; UG/ML
INJECTION, SOLUTION INFILTRATION; PERINEURAL AS NEEDED
Status: DISCONTINUED | OUTPATIENT
Start: 2020-04-06 | End: 2020-04-06 | Stop reason: HOSPADM

## 2020-04-06 RX ORDER — ONDANSETRON 2 MG/ML
4 INJECTION INTRAMUSCULAR; INTRAVENOUS ONCE AS NEEDED
Status: COMPLETED | OUTPATIENT
Start: 2020-04-06 | End: 2020-04-06

## 2020-04-06 RX ORDER — ALBUMIN, HUMAN INJ 5% 5 %
SOLUTION INTRAVENOUS CONTINUOUS PRN
Status: DISCONTINUED | OUTPATIENT
Start: 2020-04-06 | End: 2020-04-06 | Stop reason: SURG

## 2020-04-06 RX ORDER — LABETALOL 20 MG/4 ML (5 MG/ML) INTRAVENOUS SYRINGE
AS NEEDED
Status: DISCONTINUED | OUTPATIENT
Start: 2020-04-06 | End: 2020-04-06 | Stop reason: SURG

## 2020-04-06 RX ORDER — SODIUM CHLORIDE 9 MG/ML
75 INJECTION, SOLUTION INTRAVENOUS CONTINUOUS
Status: DISCONTINUED | OUTPATIENT
Start: 2020-04-06 | End: 2020-04-06

## 2020-04-06 RX ORDER — SODIUM CHLORIDE 9 MG/ML
INJECTION, SOLUTION INTRAVENOUS CONTINUOUS PRN
Status: DISCONTINUED | OUTPATIENT
Start: 2020-04-06 | End: 2020-04-06 | Stop reason: SURG

## 2020-04-06 RX ORDER — ATORVASTATIN CALCIUM 40 MG/1
40 TABLET, FILM COATED ORAL EVERY EVENING
Status: DISCONTINUED | OUTPATIENT
Start: 2020-04-06 | End: 2020-04-08 | Stop reason: HOSPADM

## 2020-04-06 RX ORDER — SODIUM CHLORIDE, SODIUM LACTATE, POTASSIUM CHLORIDE, CALCIUM CHLORIDE 600; 310; 30; 20 MG/100ML; MG/100ML; MG/100ML; MG/100ML
125 INJECTION, SOLUTION INTRAVENOUS CONTINUOUS
Status: DISCONTINUED | OUTPATIENT
Start: 2020-04-06 | End: 2020-04-06

## 2020-04-06 RX ORDER — CEFAZOLIN SODIUM 1 G/50ML
1000 SOLUTION INTRAVENOUS ONCE
Status: COMPLETED | OUTPATIENT
Start: 2020-04-06 | End: 2020-04-06

## 2020-04-06 RX ORDER — LIDOCAINE HYDROCHLORIDE 10 MG/ML
0.5 INJECTION, SOLUTION EPIDURAL; INFILTRATION; INTRACAUDAL; PERINEURAL ONCE AS NEEDED
Status: COMPLETED | OUTPATIENT
Start: 2020-04-06 | End: 2020-04-06

## 2020-04-06 RX ORDER — AMLODIPINE BESYLATE 10 MG/1
10 TABLET ORAL DAILY
Status: DISCONTINUED | OUTPATIENT
Start: 2020-04-07 | End: 2020-04-07

## 2020-04-06 RX ORDER — SUCCINYLCHOLINE/SOD CL,ISO/PF 100 MG/5ML
SYRINGE (ML) INTRAVENOUS AS NEEDED
Status: DISCONTINUED | OUTPATIENT
Start: 2020-04-06 | End: 2020-04-06 | Stop reason: SURG

## 2020-04-06 RX ORDER — ONDANSETRON 2 MG/ML
4 INJECTION INTRAMUSCULAR; INTRAVENOUS EVERY 4 HOURS PRN
Status: DISCONTINUED | OUTPATIENT
Start: 2020-04-06 | End: 2020-04-08 | Stop reason: HOSPADM

## 2020-04-06 RX ORDER — KETAMINE HCL IN NACL, ISO-OSM 100MG/10ML
SYRINGE (ML) INJECTION AS NEEDED
Status: DISCONTINUED | OUTPATIENT
Start: 2020-04-06 | End: 2020-04-06 | Stop reason: SURG

## 2020-04-06 RX ORDER — FENTANYL CITRATE/PF 50 MCG/ML
25 SYRINGE (ML) INJECTION
Status: DISCONTINUED | OUTPATIENT
Start: 2020-04-06 | End: 2020-04-06 | Stop reason: HOSPADM

## 2020-04-06 RX ORDER — LIDOCAINE HYDROCHLORIDE 10 MG/ML
INJECTION, SOLUTION EPIDURAL; INFILTRATION; INTRACAUDAL; PERINEURAL AS NEEDED
Status: DISCONTINUED | OUTPATIENT
Start: 2020-04-06 | End: 2020-04-06 | Stop reason: SURG

## 2020-04-06 RX ADMIN — DEXMEDETOMIDINE 8 MCG: 100 INJECTION, SOLUTION, CONCENTRATE INTRAVENOUS at 07:34

## 2020-04-06 RX ADMIN — ROCURONIUM BROMIDE 10 MG: 10 INJECTION, SOLUTION INTRAVENOUS at 09:13

## 2020-04-06 RX ADMIN — LIDOCAINE HYDROCHLORIDE 0.2 ML: 10 INJECTION, SOLUTION EPIDURAL; INFILTRATION; INTRACAUDAL; PERINEURAL at 06:35

## 2020-04-06 RX ADMIN — DOCUSATE SODIUM 100 MG: 100 CAPSULE, LIQUID FILLED ORAL at 17:04

## 2020-04-06 RX ADMIN — DEXMEDETOMIDINE 8 MCG: 100 INJECTION, SOLUTION, CONCENTRATE INTRAVENOUS at 07:22

## 2020-04-06 RX ADMIN — ACETAMINOPHEN 650 MG: 325 TABLET ORAL at 18:38

## 2020-04-06 RX ADMIN — ROCURONIUM BROMIDE 30 MG: 10 INJECTION, SOLUTION INTRAVENOUS at 08:01

## 2020-04-06 RX ADMIN — PROPOFOL 150 MG: 10 INJECTION, EMULSION INTRAVENOUS at 07:39

## 2020-04-06 RX ADMIN — ATORVASTATIN CALCIUM 40 MG: 40 TABLET, FILM COATED ORAL at 17:04

## 2020-04-06 RX ADMIN — Medication 20 MG: at 07:51

## 2020-04-06 RX ADMIN — SUGAMMADEX 100 MG: 100 INJECTION, SOLUTION INTRAVENOUS at 11:08

## 2020-04-06 RX ADMIN — ROCURONIUM BROMIDE 10 MG: 10 INJECTION, SOLUTION INTRAVENOUS at 09:09

## 2020-04-06 RX ADMIN — SODIUM CHLORIDE: 0.9 INJECTION, SOLUTION INTRAVENOUS at 09:45

## 2020-04-06 RX ADMIN — NEOSTIGMINE METHYLSULFATE 4 MG: 1 INJECTION, SOLUTION INTRAVENOUS at 10:53

## 2020-04-06 RX ADMIN — FENTANYL CITRATE 25 MCG: 50 INJECTION, SOLUTION INTRAMUSCULAR; INTRAVENOUS at 19:24

## 2020-04-06 RX ADMIN — PHENYLEPHRINE HYDROCHLORIDE 100 MCG: 10 INJECTION INTRAVENOUS at 09:07

## 2020-04-06 RX ADMIN — GLYCOPYRROLATE 0.4 MG: 0.2 INJECTION, SOLUTION INTRAMUSCULAR; INTRAVENOUS at 10:53

## 2020-04-06 RX ADMIN — ROCURONIUM BROMIDE 10 MG: 10 INJECTION, SOLUTION INTRAVENOUS at 10:09

## 2020-04-06 RX ADMIN — Medication 100 MG: at 07:39

## 2020-04-06 RX ADMIN — ONDANSETRON 4 MG: 2 INJECTION INTRAMUSCULAR; INTRAVENOUS at 07:22

## 2020-04-06 RX ADMIN — SODIUM CHLORIDE: 0.9 INJECTION, SOLUTION INTRAVENOUS at 07:05

## 2020-04-06 RX ADMIN — ALBUMIN (HUMAN): 12.5 SOLUTION INTRAVENOUS at 08:25

## 2020-04-06 RX ADMIN — LABETALOL 20 MG/4 ML (5 MG/ML) INTRAVENOUS SYRINGE 5 MG: at 08:37

## 2020-04-06 RX ADMIN — LIDOCAINE HYDROCHLORIDE 50 MG: 10 INJECTION, SOLUTION EPIDURAL; INFILTRATION; INTRACAUDAL; PERINEURAL at 07:39

## 2020-04-06 RX ADMIN — PROPOFOL 50 MG: 10 INJECTION, EMULSION INTRAVENOUS at 08:01

## 2020-04-06 RX ADMIN — DEXMEDETOMIDINE 8 MCG: 100 INJECTION, SOLUTION, CONCENTRATE INTRAVENOUS at 07:29

## 2020-04-06 RX ADMIN — CLINDAMYCIN PHOSPHATE 600 MG: 600 INJECTION, SOLUTION INTRAVENOUS at 17:09

## 2020-04-06 RX ADMIN — DEXMEDETOMIDINE 8 MCG: 100 INJECTION, SOLUTION, CONCENTRATE INTRAVENOUS at 07:46

## 2020-04-06 RX ADMIN — CEFAZOLIN SODIUM 2000 MG: 1 SOLUTION INTRAVENOUS at 07:42

## 2020-04-06 RX ADMIN — PROPOFOL 50 MG: 10 INJECTION, EMULSION INTRAVENOUS at 09:13

## 2020-04-06 RX ADMIN — GLYCOPYRROLATE 0.1 MG: 0.2 INJECTION, SOLUTION INTRAMUSCULAR; INTRAVENOUS at 07:22

## 2020-04-06 RX ADMIN — CHLORHEXIDINE GLUCONATE 0.12% ORAL RINSE 15 ML: 1.2 LIQUID ORAL at 06:16

## 2020-04-06 RX ADMIN — ONDANSETRON 4 MG: 2 INJECTION INTRAMUSCULAR; INTRAVENOUS at 10:53

## 2020-04-06 RX ADMIN — DEXMEDETOMIDINE 8 MCG: 100 INJECTION, SOLUTION, CONCENTRATE INTRAVENOUS at 07:39

## 2020-04-06 RX ADMIN — SODIUM CHLORIDE 75 ML/HR: 0.9 INJECTION, SOLUTION INTRAVENOUS at 11:50

## 2020-04-06 RX ADMIN — ONDANSETRON 4 MG: 2 INJECTION INTRAMUSCULAR; INTRAVENOUS at 11:47

## 2020-04-06 RX ADMIN — ROCURONIUM BROMIDE 10 MG: 10 INJECTION, SOLUTION INTRAVENOUS at 09:54

## 2020-04-06 RX ADMIN — SODIUM CHLORIDE: 0.9 INJECTION, SOLUTION INTRAVENOUS at 06:35

## 2020-04-06 RX ADMIN — Medication 30 MG: at 08:16

## 2020-04-06 RX ADMIN — OXYCODONE HYDROCHLORIDE 5 MG: 5 TABLET ORAL at 18:38

## 2020-04-07 ENCOUNTER — APPOINTMENT (INPATIENT)
Dept: RADIOLOGY | Facility: HOSPITAL | Age: 80
DRG: 982 | End: 2020-04-07
Payer: MEDICARE

## 2020-04-07 LAB
ANION GAP SERPL CALCULATED.3IONS-SCNC: 7 MMOL/L (ref 4–13)
ATRIAL RATE: 83 BPM
BASOPHILS # BLD AUTO: 0.03 THOUSANDS/ΜL (ref 0–0.1)
BASOPHILS NFR BLD AUTO: 0 % (ref 0–1)
BUN SERPL-MCNC: 15 MG/DL (ref 5–25)
CALCIUM SERPL-MCNC: 8.1 MG/DL (ref 8.3–10.1)
CHLORIDE SERPL-SCNC: 110 MMOL/L (ref 100–108)
CO2 SERPL-SCNC: 28 MMOL/L (ref 21–32)
CREAT SERPL-MCNC: 0.98 MG/DL (ref 0.6–1.3)
EOSINOPHIL # BLD AUTO: 0.06 THOUSAND/ΜL (ref 0–0.61)
EOSINOPHIL NFR BLD AUTO: 1 % (ref 0–6)
ERYTHROCYTE [DISTWIDTH] IN BLOOD BY AUTOMATED COUNT: 15 % (ref 11.6–15.1)
GFR SERPL CREATININE-BSD FRML MDRD: 55 ML/MIN/1.73SQ M
GLUCOSE SERPL-MCNC: 112 MG/DL (ref 65–140)
GLUCOSE SERPL-MCNC: 204 MG/DL (ref 65–140)
HCT VFR BLD AUTO: 32.3 % (ref 34.8–46.1)
HGB BLD-MCNC: 10.1 G/DL (ref 11.5–15.4)
IMM GRANULOCYTES # BLD AUTO: 0.04 THOUSAND/UL (ref 0–0.2)
IMM GRANULOCYTES NFR BLD AUTO: 0 % (ref 0–2)
LYMPHOCYTES # BLD AUTO: 0.94 THOUSANDS/ΜL (ref 0.6–4.47)
LYMPHOCYTES NFR BLD AUTO: 10 % (ref 14–44)
MAGNESIUM SERPL-MCNC: 1.6 MG/DL (ref 1.6–2.6)
MCH RBC QN AUTO: 27.7 PG (ref 26.8–34.3)
MCHC RBC AUTO-ENTMCNC: 31.3 G/DL (ref 31.4–37.4)
MCV RBC AUTO: 89 FL (ref 82–98)
MONOCYTES # BLD AUTO: 0.79 THOUSAND/ΜL (ref 0.17–1.22)
MONOCYTES NFR BLD AUTO: 9 % (ref 4–12)
NEUTROPHILS # BLD AUTO: 7.28 THOUSANDS/ΜL (ref 1.85–7.62)
NEUTS SEG NFR BLD AUTO: 80 % (ref 43–75)
NRBC BLD AUTO-RTO: 0 /100 WBCS
P AXIS: 73 DEGREES
PHOSPHATE SERPL-MCNC: 3.3 MG/DL (ref 2.3–4.1)
PLATELET # BLD AUTO: 147 THOUSANDS/UL (ref 149–390)
PMV BLD AUTO: 11.3 FL (ref 8.9–12.7)
POTASSIUM SERPL-SCNC: 3.5 MMOL/L (ref 3.5–5.3)
PR INTERVAL: 200 MS
QRS AXIS: 94 DEGREES
QRSD INTERVAL: 129 MS
QT INTERVAL: 425 MS
QTC INTERVAL: 500 MS
RBC # BLD AUTO: 3.65 MILLION/UL (ref 3.81–5.12)
SODIUM SERPL-SCNC: 145 MMOL/L (ref 136–145)
T WAVE AXIS: 17 DEGREES
VENTRICULAR RATE: 83 BPM
WBC # BLD AUTO: 9.14 THOUSAND/UL (ref 4.31–10.16)

## 2020-04-07 PROCEDURE — 80048 BASIC METABOLIC PNL TOTAL CA: CPT | Performed by: NEUROLOGICAL SURGERY

## 2020-04-07 PROCEDURE — 99222 1ST HOSP IP/OBS MODERATE 55: CPT | Performed by: INTERNAL MEDICINE

## 2020-04-07 PROCEDURE — 97163 PT EVAL HIGH COMPLEX 45 MIN: CPT

## 2020-04-07 PROCEDURE — 83735 ASSAY OF MAGNESIUM: CPT | Performed by: EMERGENCY MEDICINE

## 2020-04-07 PROCEDURE — 97167 OT EVAL HIGH COMPLEX 60 MIN: CPT

## 2020-04-07 PROCEDURE — 99024 POSTOP FOLLOW-UP VISIT: CPT | Performed by: PHYSICIAN ASSISTANT

## 2020-04-07 PROCEDURE — 82948 REAGENT STRIP/BLOOD GLUCOSE: CPT

## 2020-04-07 PROCEDURE — 99233 SBSQ HOSP IP/OBS HIGH 50: CPT | Performed by: INTERNAL MEDICINE

## 2020-04-07 PROCEDURE — 85025 COMPLETE CBC W/AUTO DIFF WBC: CPT | Performed by: EMERGENCY MEDICINE

## 2020-04-07 PROCEDURE — 93010 ELECTROCARDIOGRAM REPORT: CPT | Performed by: INTERNAL MEDICINE

## 2020-04-07 PROCEDURE — 84100 ASSAY OF PHOSPHORUS: CPT | Performed by: EMERGENCY MEDICINE

## 2020-04-07 PROCEDURE — 70450 CT HEAD/BRAIN W/O DYE: CPT

## 2020-04-07 RX ORDER — LOSARTAN POTASSIUM 50 MG/1
50 TABLET ORAL 2 TIMES DAILY
Status: DISCONTINUED | OUTPATIENT
Start: 2020-04-07 | End: 2020-04-08 | Stop reason: HOSPADM

## 2020-04-07 RX ORDER — POTASSIUM CHLORIDE 20 MEQ/1
20 TABLET, EXTENDED RELEASE ORAL ONCE
Status: COMPLETED | OUTPATIENT
Start: 2020-04-07 | End: 2020-04-07

## 2020-04-07 RX ORDER — POTASSIUM CHLORIDE 14.9 MG/ML
20 INJECTION INTRAVENOUS ONCE
Status: DISCONTINUED | OUTPATIENT
Start: 2020-04-07 | End: 2020-04-07

## 2020-04-07 RX ORDER — POTASSIUM CHLORIDE 29.8 MG/ML
40 INJECTION INTRAVENOUS ONCE
Status: DISCONTINUED | OUTPATIENT
Start: 2020-04-07 | End: 2020-04-07

## 2020-04-07 RX ORDER — OXYCODONE HYDROCHLORIDE 5 MG/1
2.5 TABLET ORAL EVERY 4 HOURS PRN
Status: DISCONTINUED | OUTPATIENT
Start: 2020-04-07 | End: 2020-04-07

## 2020-04-07 RX ORDER — MAGNESIUM SULFATE HEPTAHYDRATE 40 MG/ML
4 INJECTION, SOLUTION INTRAVENOUS ONCE
Status: COMPLETED | OUTPATIENT
Start: 2020-04-07 | End: 2020-04-07

## 2020-04-07 RX ORDER — OXYCODONE HYDROCHLORIDE 5 MG/1
2.5 TABLET ORAL EVERY 4 HOURS PRN
Status: DISCONTINUED | OUTPATIENT
Start: 2020-04-07 | End: 2020-04-08 | Stop reason: HOSPADM

## 2020-04-07 RX ORDER — OXYCODONE HYDROCHLORIDE 5 MG/1
5 TABLET ORAL EVERY 4 HOURS PRN
Status: DISCONTINUED | OUTPATIENT
Start: 2020-04-07 | End: 2020-04-08 | Stop reason: HOSPADM

## 2020-04-07 RX ORDER — HEPARIN SODIUM 5000 [USP'U]/ML
5000 INJECTION, SOLUTION INTRAVENOUS; SUBCUTANEOUS EVERY 8 HOURS SCHEDULED
Status: DISCONTINUED | OUTPATIENT
Start: 2020-04-08 | End: 2020-04-08 | Stop reason: HOSPADM

## 2020-04-07 RX ORDER — HEPARIN SODIUM 5000 [USP'U]/ML
5000 INJECTION, SOLUTION INTRAVENOUS; SUBCUTANEOUS EVERY 8 HOURS SCHEDULED
Status: DISCONTINUED | OUTPATIENT
Start: 2020-04-08 | End: 2020-04-07

## 2020-04-07 RX ORDER — AMLODIPINE BESYLATE 10 MG/1
10 TABLET ORAL DAILY
Status: DISCONTINUED | OUTPATIENT
Start: 2020-04-08 | End: 2020-04-08 | Stop reason: HOSPADM

## 2020-04-07 RX ADMIN — LEVOTHYROXINE SODIUM 112 MCG: 112 TABLET ORAL at 05:52

## 2020-04-07 RX ADMIN — SPIRONOLACTONE 12.5 MG: 25 TABLET ORAL at 08:08

## 2020-04-07 RX ADMIN — BUMETANIDE 2 MG: 2 TABLET ORAL at 08:08

## 2020-04-07 RX ADMIN — LOSARTAN POTASSIUM 50 MG: 50 TABLET, FILM COATED ORAL at 08:09

## 2020-04-07 RX ADMIN — POTASSIUM CHLORIDE 20 MEQ: 1500 TABLET, EXTENDED RELEASE ORAL at 12:08

## 2020-04-07 RX ADMIN — OXYCODONE HYDROCHLORIDE 5 MG: 5 TABLET ORAL at 08:26

## 2020-04-07 RX ADMIN — ATORVASTATIN CALCIUM 40 MG: 40 TABLET, FILM COATED ORAL at 17:01

## 2020-04-07 RX ADMIN — CLINDAMYCIN PHOSPHATE 600 MG: 600 INJECTION, SOLUTION INTRAVENOUS at 01:37

## 2020-04-07 RX ADMIN — DOCUSATE SODIUM 100 MG: 100 CAPSULE, LIQUID FILLED ORAL at 08:08

## 2020-04-07 RX ADMIN — DOCUSATE SODIUM 100 MG: 100 CAPSULE, LIQUID FILLED ORAL at 17:01

## 2020-04-07 RX ADMIN — ACETAMINOPHEN 650 MG: 325 TABLET ORAL at 23:39

## 2020-04-07 RX ADMIN — MAGNESIUM SULFATE HEPTAHYDRATE 4 G: 40 INJECTION, SOLUTION INTRAVENOUS at 10:42

## 2020-04-07 RX ADMIN — PANTOPRAZOLE SODIUM 40 MG: 40 TABLET, DELAYED RELEASE ORAL at 05:52

## 2020-04-07 RX ADMIN — LOSARTAN POTASSIUM 50 MG: 50 TABLET, FILM COATED ORAL at 17:01

## 2020-04-07 RX ADMIN — ACETAMINOPHEN 650 MG: 325 TABLET ORAL at 03:02

## 2020-04-08 ENCOUNTER — APPOINTMENT (OUTPATIENT)
Dept: OCCUPATIONAL THERAPY | Facility: CLINIC | Age: 80
End: 2020-04-08
Payer: MEDICARE

## 2020-04-08 ENCOUNTER — HOSPITAL ENCOUNTER (INPATIENT)
Facility: HOSPITAL | Age: 80
LOS: 8 days | Discharge: HOME | DRG: 057 | End: 2020-04-16
Payer: MEDICARE

## 2020-04-08 VITALS
OXYGEN SATURATION: 92 % | BODY MASS INDEX: 39.65 KG/M2 | WEIGHT: 210 LBS | TEMPERATURE: 98.7 F | SYSTOLIC BLOOD PRESSURE: 135 MMHG | HEIGHT: 61 IN | RESPIRATION RATE: 18 BRPM | DIASTOLIC BLOOD PRESSURE: 54 MMHG | HEART RATE: 71 BPM

## 2020-04-08 DIAGNOSIS — I61.9 INTRAPARENCHYMAL HEMORRHAGE OF BRAIN (HCC): ICD-10-CM

## 2020-04-08 DIAGNOSIS — I50.33 ACUTE ON CHRONIC DIASTOLIC CONGESTIVE HEART FAILURE (HCC): ICD-10-CM

## 2020-04-08 DIAGNOSIS — N18.30 CKD (CHRONIC KIDNEY DISEASE), STAGE III (HCC): Chronic | ICD-10-CM

## 2020-04-08 DIAGNOSIS — I10 BENIGN ESSENTIAL HYPERTENSION: Primary | Chronic | ICD-10-CM

## 2020-04-08 LAB
ANION GAP SERPL CALCULATED.3IONS-SCNC: 5 MMOL/L (ref 4–13)
BASOPHILS # BLD AUTO: 0.04 THOUSANDS/ΜL (ref 0–0.1)
BASOPHILS NFR BLD AUTO: 0 % (ref 0–1)
BUN SERPL-MCNC: 17 MG/DL (ref 5–25)
CALCIUM SERPL-MCNC: 8.5 MG/DL (ref 8.3–10.1)
CHLORIDE SERPL-SCNC: 108 MMOL/L (ref 100–108)
CO2 SERPL-SCNC: 29 MMOL/L (ref 21–32)
CREAT SERPL-MCNC: 0.94 MG/DL (ref 0.6–1.3)
EOSINOPHIL # BLD AUTO: 0.18 THOUSAND/ΜL (ref 0–0.61)
EOSINOPHIL NFR BLD AUTO: 2 % (ref 0–6)
ERYTHROCYTE [DISTWIDTH] IN BLOOD BY AUTOMATED COUNT: 14.9 % (ref 11.6–15.1)
GFR SERPL CREATININE-BSD FRML MDRD: 57 ML/MIN/1.73SQ M
GLUCOSE SERPL-MCNC: 116 MG/DL (ref 65–140)
HCT VFR BLD AUTO: 30.8 % (ref 34.8–46.1)
HGB BLD-MCNC: 9.6 G/DL (ref 11.5–15.4)
IMM GRANULOCYTES # BLD AUTO: 0.04 THOUSAND/UL (ref 0–0.2)
IMM GRANULOCYTES NFR BLD AUTO: 0 % (ref 0–2)
LYMPHOCYTES # BLD AUTO: 1.06 THOUSANDS/ΜL (ref 0.6–4.47)
LYMPHOCYTES NFR BLD AUTO: 12 % (ref 14–44)
MCH RBC QN AUTO: 27.4 PG (ref 26.8–34.3)
MCHC RBC AUTO-ENTMCNC: 31.2 G/DL (ref 31.4–37.4)
MCV RBC AUTO: 88 FL (ref 82–98)
MONOCYTES # BLD AUTO: 0.97 THOUSAND/ΜL (ref 0.17–1.22)
MONOCYTES NFR BLD AUTO: 11 % (ref 4–12)
NEUTROPHILS # BLD AUTO: 6.67 THOUSANDS/ΜL (ref 1.85–7.62)
NEUTS SEG NFR BLD AUTO: 75 % (ref 43–75)
NRBC BLD AUTO-RTO: 0 /100 WBCS
PLATELET # BLD AUTO: 130 THOUSANDS/UL (ref 149–390)
PMV BLD AUTO: 11.6 FL (ref 8.9–12.7)
POTASSIUM SERPL-SCNC: 3.7 MMOL/L (ref 3.5–5.3)
RBC # BLD AUTO: 3.51 MILLION/UL (ref 3.81–5.12)
SODIUM SERPL-SCNC: 142 MMOL/L (ref 136–145)
WBC # BLD AUTO: 8.96 THOUSAND/UL (ref 4.31–10.16)

## 2020-04-08 PROCEDURE — NC001 PR NO CHARGE

## 2020-04-08 PROCEDURE — 99232 SBSQ HOSP IP/OBS MODERATE 35: CPT | Performed by: INTERNAL MEDICINE

## 2020-04-08 PROCEDURE — 80048 BASIC METABOLIC PNL TOTAL CA: CPT | Performed by: EMERGENCY MEDICINE

## 2020-04-08 PROCEDURE — 99223 1ST HOSP IP/OBS HIGH 75: CPT

## 2020-04-08 PROCEDURE — 85025 COMPLETE CBC W/AUTO DIFF WBC: CPT | Performed by: EMERGENCY MEDICINE

## 2020-04-08 PROCEDURE — NC001 PR NO CHARGE: Performed by: PHYSICIAN ASSISTANT

## 2020-04-08 PROCEDURE — 99024 POSTOP FOLLOW-UP VISIT: CPT | Performed by: PHYSICIAN ASSISTANT

## 2020-04-08 RX ORDER — ATORVASTATIN CALCIUM 40 MG/1
40 TABLET, FILM COATED ORAL EVERY EVENING
Status: DISCONTINUED | OUTPATIENT
Start: 2020-04-08 | End: 2020-04-16 | Stop reason: HOSPADM

## 2020-04-08 RX ORDER — LEVOTHYROXINE SODIUM 112 UG/1
112 TABLET ORAL
Status: CANCELLED | OUTPATIENT
Start: 2020-04-09

## 2020-04-08 RX ORDER — ACETAMINOPHEN 325 MG/1
650 TABLET ORAL EVERY 6 HOURS PRN
Status: DISCONTINUED | OUTPATIENT
Start: 2020-04-08 | End: 2020-04-16 | Stop reason: HOSPADM

## 2020-04-08 RX ORDER — AMLODIPINE BESYLATE 10 MG/1
10 TABLET ORAL DAILY
Status: DISCONTINUED | OUTPATIENT
Start: 2020-04-09 | End: 2020-04-16 | Stop reason: HOSPADM

## 2020-04-08 RX ORDER — ONDANSETRON 2 MG/ML
4 INJECTION INTRAMUSCULAR; INTRAVENOUS EVERY 4 HOURS PRN
Status: CANCELLED | OUTPATIENT
Start: 2020-04-08

## 2020-04-08 RX ORDER — HEPARIN SODIUM 5000 [USP'U]/ML
5000 INJECTION, SOLUTION INTRAVENOUS; SUBCUTANEOUS EVERY 8 HOURS SCHEDULED
Status: CANCELLED | OUTPATIENT
Start: 2020-04-08

## 2020-04-08 RX ORDER — HEPARIN SODIUM 5000 [USP'U]/ML
5000 INJECTION, SOLUTION INTRAVENOUS; SUBCUTANEOUS EVERY 8 HOURS SCHEDULED
Status: DISCONTINUED | OUTPATIENT
Start: 2020-04-08 | End: 2020-04-14

## 2020-04-08 RX ORDER — BISACODYL 10 MG
10 SUPPOSITORY, RECTAL RECTAL DAILY PRN
Status: CANCELLED | OUTPATIENT
Start: 2020-04-08

## 2020-04-08 RX ORDER — ONDANSETRON 4 MG/1
4 TABLET, ORALLY DISINTEGRATING ORAL EVERY 8 HOURS PRN
Status: DISCONTINUED | OUTPATIENT
Start: 2020-04-08 | End: 2020-04-16 | Stop reason: HOSPADM

## 2020-04-08 RX ORDER — DOCUSATE SODIUM 100 MG/1
100 CAPSULE, LIQUID FILLED ORAL 2 TIMES DAILY
Status: CANCELLED | OUTPATIENT
Start: 2020-04-08

## 2020-04-08 RX ORDER — ATORVASTATIN CALCIUM 40 MG/1
40 TABLET, FILM COATED ORAL EVERY EVENING
Status: CANCELLED | OUTPATIENT
Start: 2020-04-08

## 2020-04-08 RX ORDER — OXYCODONE HYDROCHLORIDE 5 MG/1
5 TABLET ORAL EVERY 4 HOURS PRN
Status: DISCONTINUED | OUTPATIENT
Start: 2020-04-08 | End: 2020-04-08

## 2020-04-08 RX ORDER — POLYETHYLENE GLYCOL 3350 17 G/17G
17 POWDER, FOR SOLUTION ORAL DAILY PRN
Status: DISCONTINUED | OUTPATIENT
Start: 2020-04-08 | End: 2020-04-16 | Stop reason: HOSPADM

## 2020-04-08 RX ORDER — SENNOSIDES 8.6 MG
1 TABLET ORAL 2 TIMES DAILY
Status: DISCONTINUED | OUTPATIENT
Start: 2020-04-08 | End: 2020-04-16 | Stop reason: HOSPADM

## 2020-04-08 RX ORDER — LOSARTAN POTASSIUM 50 MG/1
50 TABLET ORAL 2 TIMES DAILY
Status: CANCELLED | OUTPATIENT
Start: 2020-04-08

## 2020-04-08 RX ORDER — LOSARTAN POTASSIUM 50 MG/1
50 TABLET ORAL 2 TIMES DAILY
Status: DISCONTINUED | OUTPATIENT
Start: 2020-04-08 | End: 2020-04-16 | Stop reason: HOSPADM

## 2020-04-08 RX ORDER — POLYETHYLENE GLYCOL 3350 17 G/17G
17 POWDER, FOR SOLUTION ORAL ONCE
Status: COMPLETED | OUTPATIENT
Start: 2020-04-08 | End: 2020-04-08

## 2020-04-08 RX ORDER — OXYCODONE HYDROCHLORIDE 5 MG/1
5 TABLET ORAL EVERY 4 HOURS PRN
Status: CANCELLED | OUTPATIENT
Start: 2020-04-08

## 2020-04-08 RX ORDER — OXYCODONE HYDROCHLORIDE 5 MG/1
2.5 TABLET ORAL EVERY 4 HOURS PRN
Status: DISCONTINUED | OUTPATIENT
Start: 2020-04-08 | End: 2020-04-13

## 2020-04-08 RX ORDER — PANTOPRAZOLE SODIUM 40 MG/1
40 TABLET, DELAYED RELEASE ORAL
Status: CANCELLED | OUTPATIENT
Start: 2020-04-09

## 2020-04-08 RX ORDER — BUMETANIDE 2 MG/1
2 TABLET ORAL DAILY
Status: DISCONTINUED | OUTPATIENT
Start: 2020-04-09 | End: 2020-04-08

## 2020-04-08 RX ORDER — BUMETANIDE 2 MG/1
2 TABLET ORAL DAILY
Status: CANCELLED | OUTPATIENT
Start: 2020-04-09

## 2020-04-08 RX ORDER — PANTOPRAZOLE SODIUM 40 MG/1
40 TABLET, DELAYED RELEASE ORAL
Status: DISCONTINUED | OUTPATIENT
Start: 2020-04-09 | End: 2020-04-16 | Stop reason: HOSPADM

## 2020-04-08 RX ORDER — BUMETANIDE 2 MG/1
2 TABLET ORAL DAILY
Status: DISCONTINUED | OUTPATIENT
Start: 2020-04-08 | End: 2020-04-16 | Stop reason: HOSPADM

## 2020-04-08 RX ORDER — LEVOTHYROXINE SODIUM 112 UG/1
112 TABLET ORAL
Status: DISCONTINUED | OUTPATIENT
Start: 2020-04-09 | End: 2020-04-16 | Stop reason: HOSPADM

## 2020-04-08 RX ORDER — AMLODIPINE BESYLATE 10 MG/1
10 TABLET ORAL DAILY
Status: CANCELLED | OUTPATIENT
Start: 2020-04-09

## 2020-04-08 RX ORDER — ACETAMINOPHEN 325 MG/1
650 TABLET ORAL EVERY 4 HOURS PRN
Status: CANCELLED | OUTPATIENT
Start: 2020-04-08

## 2020-04-08 RX ORDER — OXYCODONE HYDROCHLORIDE 5 MG/1
2.5 TABLET ORAL EVERY 4 HOURS PRN
Status: DISCONTINUED | OUTPATIENT
Start: 2020-04-08 | End: 2020-04-08

## 2020-04-08 RX ORDER — ACETAMINOPHEN 325 MG/1
650 TABLET ORAL EVERY 4 HOURS PRN
Status: DISCONTINUED | OUTPATIENT
Start: 2020-04-08 | End: 2020-04-08

## 2020-04-08 RX ORDER — DOCUSATE SODIUM 100 MG/1
100 CAPSULE, LIQUID FILLED ORAL 2 TIMES DAILY
Status: DISCONTINUED | OUTPATIENT
Start: 2020-04-08 | End: 2020-04-16 | Stop reason: HOSPADM

## 2020-04-08 RX ORDER — OXYCODONE HYDROCHLORIDE 5 MG/1
2.5 TABLET ORAL EVERY 4 HOURS PRN
Status: CANCELLED | OUTPATIENT
Start: 2020-04-08

## 2020-04-08 RX ORDER — BISACODYL 10 MG
10 SUPPOSITORY, RECTAL RECTAL DAILY PRN
Status: DISCONTINUED | OUTPATIENT
Start: 2020-04-08 | End: 2020-04-16 | Stop reason: HOSPADM

## 2020-04-08 RX ADMIN — LOSARTAN POTASSIUM 50 MG: 50 TABLET, FILM COATED ORAL at 08:40

## 2020-04-08 RX ADMIN — LEVOTHYROXINE SODIUM 112 MCG: 112 TABLET ORAL at 05:17

## 2020-04-08 RX ADMIN — BUMETANIDE 2 MG: 2 TABLET ORAL at 18:33

## 2020-04-08 RX ADMIN — POLYETHYLENE GLYCOL 3350 17 G: 17 POWDER, FOR SOLUTION ORAL at 18:32

## 2020-04-08 RX ADMIN — ATORVASTATIN CALCIUM 40 MG: 40 TABLET, FILM COATED ORAL at 18:33

## 2020-04-08 RX ADMIN — LOSARTAN POTASSIUM 50 MG: 50 TABLET, FILM COATED ORAL at 18:33

## 2020-04-08 RX ADMIN — ACETAMINOPHEN 650 MG: 325 TABLET ORAL at 11:09

## 2020-04-08 RX ADMIN — HEPARIN SODIUM 5000 UNITS: 5000 INJECTION INTRAVENOUS; SUBCUTANEOUS at 14:15

## 2020-04-08 RX ADMIN — DOCUSATE SODIUM 100 MG: 100 CAPSULE, LIQUID FILLED ORAL at 18:33

## 2020-04-08 RX ADMIN — DOCUSATE SODIUM 100 MG: 100 CAPSULE, LIQUID FILLED ORAL at 08:41

## 2020-04-08 RX ADMIN — HEPARIN SODIUM 5000 UNITS: 5000 INJECTION INTRAVENOUS; SUBCUTANEOUS at 21:11

## 2020-04-08 RX ADMIN — PANTOPRAZOLE SODIUM 40 MG: 40 TABLET, DELAYED RELEASE ORAL at 05:17

## 2020-04-08 RX ADMIN — HEPARIN SODIUM 5000 UNITS: 5000 INJECTION INTRAVENOUS; SUBCUTANEOUS at 05:17

## 2020-04-08 RX ADMIN — AMLODIPINE BESYLATE 10 MG: 10 TABLET ORAL at 08:40

## 2020-04-08 RX ADMIN — SENNOSIDES 8.6 MG: 8.6 TABLET, FILM COATED ORAL at 18:33

## 2020-04-09 ENCOUNTER — TELEPHONE (OUTPATIENT)
Dept: NEUROSURGERY | Facility: CLINIC | Age: 80
End: 2020-04-09

## 2020-04-09 PROBLEM — R52 PAIN: Status: ACTIVE | Noted: 2020-04-09

## 2020-04-09 PROBLEM — Z91.89 AT RISK FOR VENOUS THROMBOEMBOLISM (VTE): Status: ACTIVE | Noted: 2020-04-09

## 2020-04-09 PROBLEM — K59.9 BOWEL DYSFUNCTION: Status: ACTIVE | Noted: 2020-04-09

## 2020-04-09 LAB
ANION GAP SERPL CALCULATED.3IONS-SCNC: 6 MMOL/L (ref 4–13)
BUN SERPL-MCNC: 18 MG/DL (ref 5–25)
CALCIUM SERPL-MCNC: 8.6 MG/DL (ref 8.3–10.1)
CHLORIDE SERPL-SCNC: 104 MMOL/L (ref 100–108)
CO2 SERPL-SCNC: 31 MMOL/L (ref 21–32)
CREAT SERPL-MCNC: 0.98 MG/DL (ref 0.6–1.3)
ERYTHROCYTE [DISTWIDTH] IN BLOOD BY AUTOMATED COUNT: 14.6 % (ref 11.6–15.1)
GFR SERPL CREATININE-BSD FRML MDRD: 55 ML/MIN/1.73SQ M
GLUCOSE P FAST SERPL-MCNC: 107 MG/DL (ref 65–99)
GLUCOSE SERPL-MCNC: 107 MG/DL (ref 65–140)
HCT VFR BLD AUTO: 32.8 % (ref 34.8–46.1)
HGB BLD-MCNC: 10.4 G/DL (ref 11.5–15.4)
MCH RBC QN AUTO: 27.3 PG (ref 26.8–34.3)
MCHC RBC AUTO-ENTMCNC: 31.7 G/DL (ref 31.4–37.4)
MCV RBC AUTO: 86 FL (ref 82–98)
PLATELET # BLD AUTO: 142 THOUSANDS/UL (ref 149–390)
PMV BLD AUTO: 11.2 FL (ref 8.9–12.7)
POTASSIUM SERPL-SCNC: 3.6 MMOL/L (ref 3.5–5.3)
RBC # BLD AUTO: 3.81 MILLION/UL (ref 3.81–5.12)
SODIUM SERPL-SCNC: 141 MMOL/L (ref 136–145)
WBC # BLD AUTO: 8.33 THOUSAND/UL (ref 4.31–10.16)

## 2020-04-09 PROCEDURE — 85027 COMPLETE CBC AUTOMATED: CPT

## 2020-04-09 PROCEDURE — 97535 SELF CARE MNGMENT TRAINING: CPT

## 2020-04-09 PROCEDURE — 97530 THERAPEUTIC ACTIVITIES: CPT

## 2020-04-09 PROCEDURE — 97161 PT EVAL LOW COMPLEX 20 MIN: CPT

## 2020-04-09 PROCEDURE — 99232 SBSQ HOSP IP/OBS MODERATE 35: CPT | Performed by: INTERNAL MEDICINE

## 2020-04-09 PROCEDURE — 97165 OT EVAL LOW COMPLEX 30 MIN: CPT

## 2020-04-09 PROCEDURE — 80048 BASIC METABOLIC PNL TOTAL CA: CPT

## 2020-04-09 PROCEDURE — 92523 SPEECH SOUND LANG COMPREHEN: CPT

## 2020-04-09 PROCEDURE — 97129 THER IVNTJ 1ST 15 MIN: CPT

## 2020-04-09 PROCEDURE — 97130 THER IVNTJ EA ADDL 15 MIN: CPT

## 2020-04-09 RX ORDER — BISACODYL 10 MG
10 SUPPOSITORY, RECTAL RECTAL ONCE
Status: DISCONTINUED | OUTPATIENT
Start: 2020-04-09 | End: 2020-04-12

## 2020-04-09 RX ORDER — SPIRONOLACTONE 25 MG/1
12.5 TABLET ORAL DAILY
Status: DISCONTINUED | OUTPATIENT
Start: 2020-04-09 | End: 2020-04-16 | Stop reason: HOSPADM

## 2020-04-09 RX ORDER — SPIRONOLACTONE 25 MG/1
12.5 TABLET ORAL DAILY
Status: DISCONTINUED | OUTPATIENT
Start: 2020-04-10 | End: 2020-04-09

## 2020-04-09 RX ORDER — POLYETHYLENE GLYCOL 3350 17 G/17G
17 POWDER, FOR SOLUTION ORAL ONCE
Status: DISCONTINUED | OUTPATIENT
Start: 2020-04-09 | End: 2020-04-11

## 2020-04-09 RX ADMIN — DOCUSATE SODIUM 100 MG: 100 CAPSULE, LIQUID FILLED ORAL at 17:35

## 2020-04-09 RX ADMIN — HEPARIN SODIUM 5000 UNITS: 5000 INJECTION INTRAVENOUS; SUBCUTANEOUS at 05:34

## 2020-04-09 RX ADMIN — SENNOSIDES 8.6 MG: 8.6 TABLET, FILM COATED ORAL at 08:27

## 2020-04-09 RX ADMIN — ACETAMINOPHEN 650 MG: 325 TABLET ORAL at 19:49

## 2020-04-09 RX ADMIN — LOSARTAN POTASSIUM 50 MG: 50 TABLET, FILM COATED ORAL at 17:35

## 2020-04-09 RX ADMIN — SENNOSIDES 8.6 MG: 8.6 TABLET, FILM COATED ORAL at 17:35

## 2020-04-09 RX ADMIN — LOSARTAN POTASSIUM 50 MG: 50 TABLET, FILM COATED ORAL at 08:27

## 2020-04-09 RX ADMIN — LEVOTHYROXINE SODIUM 112 MCG: 112 TABLET ORAL at 05:34

## 2020-04-09 RX ADMIN — DOCUSATE SODIUM 100 MG: 100 CAPSULE, LIQUID FILLED ORAL at 08:27

## 2020-04-09 RX ADMIN — ATORVASTATIN CALCIUM 40 MG: 40 TABLET, FILM COATED ORAL at 17:35

## 2020-04-09 RX ADMIN — BUMETANIDE 2 MG: 2 TABLET ORAL at 08:30

## 2020-04-09 RX ADMIN — SPIRONOLACTONE 12.5 MG: 25 TABLET ORAL at 14:32

## 2020-04-09 RX ADMIN — PANTOPRAZOLE SODIUM 40 MG: 40 TABLET, DELAYED RELEASE ORAL at 05:34

## 2020-04-09 RX ADMIN — AMLODIPINE BESYLATE 10 MG: 10 TABLET ORAL at 08:27

## 2020-04-09 RX ADMIN — HEPARIN SODIUM 5000 UNITS: 5000 INJECTION INTRAVENOUS; SUBCUTANEOUS at 14:31

## 2020-04-10 LAB
ANION GAP SERPL CALCULATED.3IONS-SCNC: 6 MMOL/L (ref 4–13)
BUN SERPL-MCNC: 22 MG/DL (ref 5–25)
CALCIUM SERPL-MCNC: 8.8 MG/DL (ref 8.3–10.1)
CHLORIDE SERPL-SCNC: 105 MMOL/L (ref 100–108)
CO2 SERPL-SCNC: 30 MMOL/L (ref 21–32)
CREAT SERPL-MCNC: 0.92 MG/DL (ref 0.6–1.3)
GFR SERPL CREATININE-BSD FRML MDRD: 59 ML/MIN/1.73SQ M
GLUCOSE SERPL-MCNC: 103 MG/DL (ref 65–140)
POTASSIUM SERPL-SCNC: 3.5 MMOL/L (ref 3.5–5.3)
SODIUM SERPL-SCNC: 141 MMOL/L (ref 136–145)

## 2020-04-10 PROCEDURE — 80048 BASIC METABOLIC PNL TOTAL CA: CPT | Performed by: INTERNAL MEDICINE

## 2020-04-10 PROCEDURE — 97110 THERAPEUTIC EXERCISES: CPT

## 2020-04-10 PROCEDURE — 97530 THERAPEUTIC ACTIVITIES: CPT

## 2020-04-10 PROCEDURE — 99232 SBSQ HOSP IP/OBS MODERATE 35: CPT

## 2020-04-10 PROCEDURE — 97130 THER IVNTJ EA ADDL 15 MIN: CPT

## 2020-04-10 PROCEDURE — 97116 GAIT TRAINING THERAPY: CPT

## 2020-04-10 PROCEDURE — 99232 SBSQ HOSP IP/OBS MODERATE 35: CPT | Performed by: INTERNAL MEDICINE

## 2020-04-10 PROCEDURE — 97129 THER IVNTJ 1ST 15 MIN: CPT

## 2020-04-10 PROCEDURE — 97112 NEUROMUSCULAR REEDUCATION: CPT

## 2020-04-10 RX ADMIN — SENNOSIDES 8.6 MG: 8.6 TABLET, FILM COATED ORAL at 18:40

## 2020-04-10 RX ADMIN — DOCUSATE SODIUM 100 MG: 100 CAPSULE, LIQUID FILLED ORAL at 11:04

## 2020-04-10 RX ADMIN — LEVOTHYROXINE SODIUM 112 MCG: 112 TABLET ORAL at 04:55

## 2020-04-10 RX ADMIN — HEPARIN SODIUM 5000 UNITS: 5000 INJECTION INTRAVENOUS; SUBCUTANEOUS at 04:57

## 2020-04-10 RX ADMIN — SPIRONOLACTONE 12.5 MG: 25 TABLET ORAL at 11:07

## 2020-04-10 RX ADMIN — AMLODIPINE BESYLATE 10 MG: 10 TABLET ORAL at 11:05

## 2020-04-10 RX ADMIN — OXYCODONE HYDROCHLORIDE 2.5 MG: 5 TABLET ORAL at 04:55

## 2020-04-10 RX ADMIN — LOSARTAN POTASSIUM 50 MG: 50 TABLET, FILM COATED ORAL at 11:04

## 2020-04-10 RX ADMIN — BUMETANIDE 2 MG: 2 TABLET ORAL at 11:08

## 2020-04-10 RX ADMIN — ATORVASTATIN CALCIUM 40 MG: 40 TABLET, FILM COATED ORAL at 18:40

## 2020-04-10 RX ADMIN — SENNOSIDES 8.6 MG: 8.6 TABLET, FILM COATED ORAL at 11:04

## 2020-04-10 RX ADMIN — LOSARTAN POTASSIUM 50 MG: 50 TABLET, FILM COATED ORAL at 18:40

## 2020-04-10 RX ADMIN — HEPARIN SODIUM 5000 UNITS: 5000 INJECTION INTRAVENOUS; SUBCUTANEOUS at 21:01

## 2020-04-10 RX ADMIN — HEPARIN SODIUM 5000 UNITS: 5000 INJECTION INTRAVENOUS; SUBCUTANEOUS at 14:53

## 2020-04-10 RX ADMIN — PANTOPRAZOLE SODIUM 40 MG: 40 TABLET, DELAYED RELEASE ORAL at 04:56

## 2020-04-10 RX ADMIN — DOCUSATE SODIUM 100 MG: 100 CAPSULE, LIQUID FILLED ORAL at 18:41

## 2020-04-10 RX ADMIN — ACETAMINOPHEN 650 MG: 325 TABLET ORAL at 14:53

## 2020-04-11 PROCEDURE — 97530 THERAPEUTIC ACTIVITIES: CPT

## 2020-04-11 PROCEDURE — 97110 THERAPEUTIC EXERCISES: CPT

## 2020-04-11 PROCEDURE — 97112 NEUROMUSCULAR REEDUCATION: CPT

## 2020-04-11 PROCEDURE — 97129 THER IVNTJ 1ST 15 MIN: CPT

## 2020-04-11 PROCEDURE — 97116 GAIT TRAINING THERAPY: CPT

## 2020-04-11 PROCEDURE — 97130 THER IVNTJ EA ADDL 15 MIN: CPT

## 2020-04-11 RX ORDER — POLYETHYLENE GLYCOL 3350 17 G/17G
17 POWDER, FOR SOLUTION ORAL ONCE
Status: COMPLETED | OUTPATIENT
Start: 2020-04-11 | End: 2020-04-11

## 2020-04-11 RX ADMIN — DOCUSATE SODIUM 100 MG: 100 CAPSULE, LIQUID FILLED ORAL at 09:02

## 2020-04-11 RX ADMIN — BUMETANIDE 2 MG: 2 TABLET ORAL at 09:02

## 2020-04-11 RX ADMIN — LOSARTAN POTASSIUM 50 MG: 50 TABLET, FILM COATED ORAL at 09:02

## 2020-04-11 RX ADMIN — HEPARIN SODIUM 5000 UNITS: 5000 INJECTION INTRAVENOUS; SUBCUTANEOUS at 21:48

## 2020-04-11 RX ADMIN — LEVOTHYROXINE SODIUM 112 MCG: 112 TABLET ORAL at 06:23

## 2020-04-11 RX ADMIN — HEPARIN SODIUM 5000 UNITS: 5000 INJECTION INTRAVENOUS; SUBCUTANEOUS at 06:23

## 2020-04-11 RX ADMIN — SENNOSIDES 8.6 MG: 8.6 TABLET, FILM COATED ORAL at 09:02

## 2020-04-11 RX ADMIN — OXYCODONE HYDROCHLORIDE 2.5 MG: 5 TABLET ORAL at 21:44

## 2020-04-11 RX ADMIN — POLYETHYLENE GLYCOL 3350 17 G: 17 POWDER, FOR SOLUTION ORAL at 17:55

## 2020-04-11 RX ADMIN — AMLODIPINE BESYLATE 10 MG: 10 TABLET ORAL at 09:02

## 2020-04-11 RX ADMIN — PANTOPRAZOLE SODIUM 40 MG: 40 TABLET, DELAYED RELEASE ORAL at 06:23

## 2020-04-11 RX ADMIN — DOCUSATE SODIUM 100 MG: 100 CAPSULE, LIQUID FILLED ORAL at 17:36

## 2020-04-11 RX ADMIN — LOSARTAN POTASSIUM 50 MG: 50 TABLET, FILM COATED ORAL at 17:36

## 2020-04-11 RX ADMIN — SENNOSIDES 8.6 MG: 8.6 TABLET, FILM COATED ORAL at 17:55

## 2020-04-11 RX ADMIN — ATORVASTATIN CALCIUM 40 MG: 40 TABLET, FILM COATED ORAL at 17:36

## 2020-04-11 RX ADMIN — SPIRONOLACTONE 12.5 MG: 25 TABLET ORAL at 11:39

## 2020-04-11 RX ADMIN — HEPARIN SODIUM 5000 UNITS: 5000 INJECTION INTRAVENOUS; SUBCUTANEOUS at 13:55

## 2020-04-11 RX ADMIN — ACETAMINOPHEN 650 MG: 325 TABLET ORAL at 17:36

## 2020-04-12 PROCEDURE — 97112 NEUROMUSCULAR REEDUCATION: CPT

## 2020-04-12 PROCEDURE — 97535 SELF CARE MNGMENT TRAINING: CPT

## 2020-04-12 PROCEDURE — 97116 GAIT TRAINING THERAPY: CPT

## 2020-04-12 PROCEDURE — 97530 THERAPEUTIC ACTIVITIES: CPT

## 2020-04-12 RX ADMIN — SENNOSIDES 8.6 MG: 8.6 TABLET, FILM COATED ORAL at 18:00

## 2020-04-12 RX ADMIN — PANTOPRAZOLE SODIUM 40 MG: 40 TABLET, DELAYED RELEASE ORAL at 05:17

## 2020-04-12 RX ADMIN — LOSARTAN POTASSIUM 50 MG: 50 TABLET, FILM COATED ORAL at 18:00

## 2020-04-12 RX ADMIN — LOSARTAN POTASSIUM 50 MG: 50 TABLET, FILM COATED ORAL at 09:37

## 2020-04-12 RX ADMIN — POLYETHYLENE GLYCOL 3350 17 G: 17 POWDER, FOR SOLUTION ORAL at 09:36

## 2020-04-12 RX ADMIN — OXYCODONE HYDROCHLORIDE 2.5 MG: 5 TABLET ORAL at 22:04

## 2020-04-12 RX ADMIN — DOCUSATE SODIUM 100 MG: 100 CAPSULE, LIQUID FILLED ORAL at 18:00

## 2020-04-12 RX ADMIN — SPIRONOLACTONE 12.5 MG: 25 TABLET ORAL at 09:37

## 2020-04-12 RX ADMIN — LEVOTHYROXINE SODIUM 112 MCG: 112 TABLET ORAL at 05:17

## 2020-04-12 RX ADMIN — SENNOSIDES 8.6 MG: 8.6 TABLET, FILM COATED ORAL at 09:37

## 2020-04-12 RX ADMIN — AMLODIPINE BESYLATE 10 MG: 10 TABLET ORAL at 09:37

## 2020-04-12 RX ADMIN — HEPARIN SODIUM 5000 UNITS: 5000 INJECTION INTRAVENOUS; SUBCUTANEOUS at 05:17

## 2020-04-12 RX ADMIN — DOCUSATE SODIUM 100 MG: 100 CAPSULE, LIQUID FILLED ORAL at 09:37

## 2020-04-12 RX ADMIN — ATORVASTATIN CALCIUM 40 MG: 40 TABLET, FILM COATED ORAL at 18:00

## 2020-04-12 RX ADMIN — BUMETANIDE 2 MG: 2 TABLET ORAL at 09:37

## 2020-04-12 RX ADMIN — HEPARIN SODIUM 5000 UNITS: 5000 INJECTION INTRAVENOUS; SUBCUTANEOUS at 13:35

## 2020-04-12 RX ADMIN — HEPARIN SODIUM 5000 UNITS: 5000 INJECTION INTRAVENOUS; SUBCUTANEOUS at 21:11

## 2020-04-13 ENCOUNTER — APPOINTMENT (OUTPATIENT)
Dept: OCCUPATIONAL THERAPY | Facility: CLINIC | Age: 80
End: 2020-04-13
Payer: MEDICARE

## 2020-04-13 LAB
ANION GAP SERPL CALCULATED.3IONS-SCNC: 5 MMOL/L (ref 4–13)
BASOPHILS # BLD AUTO: 0.05 THOUSANDS/ΜL (ref 0–0.1)
BASOPHILS NFR BLD AUTO: 1 % (ref 0–1)
BUN SERPL-MCNC: 19 MG/DL (ref 5–25)
CALCIUM SERPL-MCNC: 9.4 MG/DL (ref 8.3–10.1)
CHLORIDE SERPL-SCNC: 105 MMOL/L (ref 100–108)
CO2 SERPL-SCNC: 30 MMOL/L (ref 21–32)
CREAT SERPL-MCNC: 1.02 MG/DL (ref 0.6–1.3)
EOSINOPHIL # BLD AUTO: 0.31 THOUSAND/ΜL (ref 0–0.61)
EOSINOPHIL NFR BLD AUTO: 4 % (ref 0–6)
ERYTHROCYTE [DISTWIDTH] IN BLOOD BY AUTOMATED COUNT: 14.5 % (ref 11.6–15.1)
GFR SERPL CREATININE-BSD FRML MDRD: 52 ML/MIN/1.73SQ M
GLUCOSE P FAST SERPL-MCNC: 99 MG/DL (ref 65–99)
GLUCOSE SERPL-MCNC: 99 MG/DL (ref 65–140)
HCT VFR BLD AUTO: 34.3 % (ref 34.8–46.1)
HGB BLD-MCNC: 10.6 G/DL (ref 11.5–15.4)
IMM GRANULOCYTES # BLD AUTO: 0.04 THOUSAND/UL (ref 0–0.2)
IMM GRANULOCYTES NFR BLD AUTO: 1 % (ref 0–2)
LYMPHOCYTES # BLD AUTO: 1.18 THOUSANDS/ΜL (ref 0.6–4.47)
LYMPHOCYTES NFR BLD AUTO: 15 % (ref 14–44)
MCH RBC QN AUTO: 26.8 PG (ref 26.8–34.3)
MCHC RBC AUTO-ENTMCNC: 30.9 G/DL (ref 31.4–37.4)
MCV RBC AUTO: 87 FL (ref 82–98)
MONOCYTES # BLD AUTO: 0.7 THOUSAND/ΜL (ref 0.17–1.22)
MONOCYTES NFR BLD AUTO: 9 % (ref 4–12)
NEUTROPHILS # BLD AUTO: 5.51 THOUSANDS/ΜL (ref 1.85–7.62)
NEUTS SEG NFR BLD AUTO: 70 % (ref 43–75)
NRBC BLD AUTO-RTO: 0 /100 WBCS
PLATELET # BLD AUTO: 198 THOUSANDS/UL (ref 149–390)
PMV BLD AUTO: 11 FL (ref 8.9–12.7)
POTASSIUM SERPL-SCNC: 3.8 MMOL/L (ref 3.5–5.3)
RBC # BLD AUTO: 3.96 MILLION/UL (ref 3.81–5.12)
SODIUM SERPL-SCNC: 140 MMOL/L (ref 136–145)
WBC # BLD AUTO: 7.79 THOUSAND/UL (ref 4.31–10.16)

## 2020-04-13 PROCEDURE — 85025 COMPLETE CBC W/AUTO DIFF WBC: CPT | Performed by: NURSE PRACTITIONER

## 2020-04-13 PROCEDURE — 97530 THERAPEUTIC ACTIVITIES: CPT

## 2020-04-13 PROCEDURE — 97110 THERAPEUTIC EXERCISES: CPT

## 2020-04-13 PROCEDURE — 80048 BASIC METABOLIC PNL TOTAL CA: CPT | Performed by: NURSE PRACTITIONER

## 2020-04-13 PROCEDURE — 97130 THER IVNTJ EA ADDL 15 MIN: CPT

## 2020-04-13 PROCEDURE — 97129 THER IVNTJ 1ST 15 MIN: CPT

## 2020-04-13 PROCEDURE — 99232 SBSQ HOSP IP/OBS MODERATE 35: CPT

## 2020-04-13 RX ORDER — OXYCODONE HYDROCHLORIDE 5 MG/1
2.5 TABLET ORAL EVERY 6 HOURS PRN
Status: DISCONTINUED | OUTPATIENT
Start: 2020-04-13 | End: 2020-04-16 | Stop reason: HOSPADM

## 2020-04-13 RX ADMIN — ATORVASTATIN CALCIUM 40 MG: 40 TABLET, FILM COATED ORAL at 17:59

## 2020-04-13 RX ADMIN — LOSARTAN POTASSIUM 50 MG: 50 TABLET, FILM COATED ORAL at 08:18

## 2020-04-13 RX ADMIN — ACETAMINOPHEN 650 MG: 325 TABLET ORAL at 18:06

## 2020-04-13 RX ADMIN — SENNOSIDES 8.6 MG: 8.6 TABLET, FILM COATED ORAL at 17:59

## 2020-04-13 RX ADMIN — SPIRONOLACTONE 12.5 MG: 25 TABLET ORAL at 08:18

## 2020-04-13 RX ADMIN — SENNOSIDES 8.6 MG: 8.6 TABLET, FILM COATED ORAL at 08:18

## 2020-04-13 RX ADMIN — HEPARIN SODIUM 5000 UNITS: 5000 INJECTION INTRAVENOUS; SUBCUTANEOUS at 05:33

## 2020-04-13 RX ADMIN — DOCUSATE SODIUM 100 MG: 100 CAPSULE, LIQUID FILLED ORAL at 17:59

## 2020-04-13 RX ADMIN — HEPARIN SODIUM 5000 UNITS: 5000 INJECTION INTRAVENOUS; SUBCUTANEOUS at 14:22

## 2020-04-13 RX ADMIN — DOCUSATE SODIUM 100 MG: 100 CAPSULE, LIQUID FILLED ORAL at 08:18

## 2020-04-13 RX ADMIN — LOSARTAN POTASSIUM 50 MG: 50 TABLET, FILM COATED ORAL at 17:59

## 2020-04-13 RX ADMIN — AMLODIPINE BESYLATE 10 MG: 10 TABLET ORAL at 08:18

## 2020-04-13 RX ADMIN — BUMETANIDE 2 MG: 2 TABLET ORAL at 08:18

## 2020-04-13 RX ADMIN — PANTOPRAZOLE SODIUM 40 MG: 40 TABLET, DELAYED RELEASE ORAL at 05:33

## 2020-04-13 RX ADMIN — LEVOTHYROXINE SODIUM 112 MCG: 112 TABLET ORAL at 05:33

## 2020-04-13 RX ADMIN — HEPARIN SODIUM 5000 UNITS: 5000 INJECTION INTRAVENOUS; SUBCUTANEOUS at 21:04

## 2020-04-14 PROCEDURE — 97535 SELF CARE MNGMENT TRAINING: CPT

## 2020-04-14 PROCEDURE — 97112 NEUROMUSCULAR REEDUCATION: CPT

## 2020-04-14 PROCEDURE — 99232 SBSQ HOSP IP/OBS MODERATE 35: CPT

## 2020-04-14 PROCEDURE — 97530 THERAPEUTIC ACTIVITIES: CPT

## 2020-04-14 PROCEDURE — 97129 THER IVNTJ 1ST 15 MIN: CPT

## 2020-04-14 PROCEDURE — 97110 THERAPEUTIC EXERCISES: CPT

## 2020-04-14 PROCEDURE — 97130 THER IVNTJ EA ADDL 15 MIN: CPT

## 2020-04-14 RX ADMIN — DOCUSATE SODIUM 100 MG: 100 CAPSULE, LIQUID FILLED ORAL at 17:54

## 2020-04-14 RX ADMIN — LEVOTHYROXINE SODIUM 112 MCG: 112 TABLET ORAL at 05:28

## 2020-04-14 RX ADMIN — SPIRONOLACTONE 12.5 MG: 25 TABLET ORAL at 09:23

## 2020-04-14 RX ADMIN — HEPARIN SODIUM 5000 UNITS: 5000 INJECTION INTRAVENOUS; SUBCUTANEOUS at 05:27

## 2020-04-14 RX ADMIN — SENNOSIDES 8.6 MG: 8.6 TABLET, FILM COATED ORAL at 09:24

## 2020-04-14 RX ADMIN — LOSARTAN POTASSIUM 50 MG: 50 TABLET, FILM COATED ORAL at 09:24

## 2020-04-14 RX ADMIN — BUMETANIDE 2 MG: 2 TABLET ORAL at 09:23

## 2020-04-14 RX ADMIN — LOSARTAN POTASSIUM 50 MG: 50 TABLET, FILM COATED ORAL at 17:54

## 2020-04-14 RX ADMIN — ATORVASTATIN CALCIUM 40 MG: 40 TABLET, FILM COATED ORAL at 17:54

## 2020-04-14 RX ADMIN — HEPARIN SODIUM 5000 UNITS: 5000 INJECTION INTRAVENOUS; SUBCUTANEOUS at 13:28

## 2020-04-14 RX ADMIN — SENNOSIDES 8.6 MG: 8.6 TABLET, FILM COATED ORAL at 17:54

## 2020-04-14 RX ADMIN — AMLODIPINE BESYLATE 10 MG: 10 TABLET ORAL at 09:24

## 2020-04-14 RX ADMIN — PANTOPRAZOLE SODIUM 40 MG: 40 TABLET, DELAYED RELEASE ORAL at 05:28

## 2020-04-14 RX ADMIN — DOCUSATE SODIUM 100 MG: 100 CAPSULE, LIQUID FILLED ORAL at 09:24

## 2020-04-15 ENCOUNTER — APPOINTMENT (OUTPATIENT)
Dept: OCCUPATIONAL THERAPY | Facility: CLINIC | Age: 80
End: 2020-04-15
Payer: MEDICARE

## 2020-04-15 PROCEDURE — 97110 THERAPEUTIC EXERCISES: CPT

## 2020-04-15 PROCEDURE — 99232 SBSQ HOSP IP/OBS MODERATE 35: CPT

## 2020-04-15 PROCEDURE — 97530 THERAPEUTIC ACTIVITIES: CPT

## 2020-04-15 PROCEDURE — 97535 SELF CARE MNGMENT TRAINING: CPT

## 2020-04-15 PROCEDURE — 97129 THER IVNTJ 1ST 15 MIN: CPT

## 2020-04-15 RX ADMIN — LOSARTAN POTASSIUM 50 MG: 50 TABLET, FILM COATED ORAL at 08:00

## 2020-04-15 RX ADMIN — AMLODIPINE BESYLATE 10 MG: 10 TABLET ORAL at 08:00

## 2020-04-15 RX ADMIN — SENNOSIDES 8.6 MG: 8.6 TABLET, FILM COATED ORAL at 08:00

## 2020-04-15 RX ADMIN — PANTOPRAZOLE SODIUM 40 MG: 40 TABLET, DELAYED RELEASE ORAL at 05:52

## 2020-04-15 RX ADMIN — SPIRONOLACTONE 12.5 MG: 25 TABLET ORAL at 08:03

## 2020-04-15 RX ADMIN — LEVOTHYROXINE SODIUM 112 MCG: 112 TABLET ORAL at 05:52

## 2020-04-15 RX ADMIN — DOCUSATE SODIUM 100 MG: 100 CAPSULE, LIQUID FILLED ORAL at 08:00

## 2020-04-15 RX ADMIN — BUMETANIDE 2 MG: 2 TABLET ORAL at 08:03

## 2020-04-15 RX ADMIN — ATORVASTATIN CALCIUM 40 MG: 40 TABLET, FILM COATED ORAL at 17:00

## 2020-04-15 RX ADMIN — LOSARTAN POTASSIUM 50 MG: 50 TABLET, FILM COATED ORAL at 17:00

## 2020-04-16 ENCOUNTER — TELEPHONE (OUTPATIENT)
Dept: NEPHROLOGY | Facility: CLINIC | Age: 80
End: 2020-04-16

## 2020-04-16 VITALS
TEMPERATURE: 98.5 F | OXYGEN SATURATION: 94 % | SYSTOLIC BLOOD PRESSURE: 114 MMHG | HEART RATE: 74 BPM | DIASTOLIC BLOOD PRESSURE: 52 MMHG | WEIGHT: 196.4 LBS | RESPIRATION RATE: 18 BRPM | BODY MASS INDEX: 37.08 KG/M2 | HEIGHT: 61 IN

## 2020-04-16 PROBLEM — Z91.89 AT RISK FOR VENOUS THROMBOEMBOLISM (VTE): Status: RESOLVED | Noted: 2020-04-09 | Resolved: 2020-04-16

## 2020-04-16 PROBLEM — K59.9 BOWEL DYSFUNCTION: Status: RESOLVED | Noted: 2020-04-09 | Resolved: 2020-04-16

## 2020-04-16 LAB
ANION GAP SERPL CALCULATED.3IONS-SCNC: 6 MMOL/L (ref 4–13)
BASOPHILS # BLD AUTO: 0.05 THOUSANDS/ΜL (ref 0–0.1)
BASOPHILS NFR BLD AUTO: 1 % (ref 0–1)
BUN SERPL-MCNC: 24 MG/DL (ref 5–25)
CALCIUM SERPL-MCNC: 9.1 MG/DL (ref 8.3–10.1)
CHLORIDE SERPL-SCNC: 106 MMOL/L (ref 100–108)
CO2 SERPL-SCNC: 28 MMOL/L (ref 21–32)
CREAT SERPL-MCNC: 1.07 MG/DL (ref 0.6–1.3)
EOSINOPHIL # BLD AUTO: 0.26 THOUSAND/ΜL (ref 0–0.61)
EOSINOPHIL NFR BLD AUTO: 4 % (ref 0–6)
ERYTHROCYTE [DISTWIDTH] IN BLOOD BY AUTOMATED COUNT: 14.9 % (ref 11.6–15.1)
GFR SERPL CREATININE-BSD FRML MDRD: 49 ML/MIN/1.73SQ M
GLUCOSE P FAST SERPL-MCNC: 96 MG/DL (ref 65–99)
GLUCOSE SERPL-MCNC: 96 MG/DL (ref 65–140)
HCT VFR BLD AUTO: 33.6 % (ref 34.8–46.1)
HGB BLD-MCNC: 10.4 G/DL (ref 11.5–15.4)
IMM GRANULOCYTES # BLD AUTO: 0.02 THOUSAND/UL (ref 0–0.2)
IMM GRANULOCYTES NFR BLD AUTO: 0 % (ref 0–2)
LYMPHOCYTES # BLD AUTO: 1 THOUSANDS/ΜL (ref 0.6–4.47)
LYMPHOCYTES NFR BLD AUTO: 16 % (ref 14–44)
MCH RBC QN AUTO: 26.9 PG (ref 26.8–34.3)
MCHC RBC AUTO-ENTMCNC: 31 G/DL (ref 31.4–37.4)
MCV RBC AUTO: 87 FL (ref 82–98)
MONOCYTES # BLD AUTO: 0.51 THOUSAND/ΜL (ref 0.17–1.22)
MONOCYTES NFR BLD AUTO: 8 % (ref 4–12)
NEUTROPHILS # BLD AUTO: 4.51 THOUSANDS/ΜL (ref 1.85–7.62)
NEUTS SEG NFR BLD AUTO: 71 % (ref 43–75)
NRBC BLD AUTO-RTO: 0 /100 WBCS
PLATELET # BLD AUTO: 224 THOUSANDS/UL (ref 149–390)
PMV BLD AUTO: 11.1 FL (ref 8.9–12.7)
POTASSIUM SERPL-SCNC: 3.9 MMOL/L (ref 3.5–5.3)
RBC # BLD AUTO: 3.86 MILLION/UL (ref 3.81–5.12)
SODIUM SERPL-SCNC: 140 MMOL/L (ref 136–145)
WBC # BLD AUTO: 6.35 THOUSAND/UL (ref 4.31–10.16)

## 2020-04-16 PROCEDURE — 85025 COMPLETE CBC W/AUTO DIFF WBC: CPT | Performed by: NURSE PRACTITIONER

## 2020-04-16 PROCEDURE — 99239 HOSP IP/OBS DSCHRG MGMT >30: CPT

## 2020-04-16 PROCEDURE — 99024 POSTOP FOLLOW-UP VISIT: CPT | Performed by: PHYSICIAN ASSISTANT

## 2020-04-16 PROCEDURE — 80048 BASIC METABOLIC PNL TOTAL CA: CPT | Performed by: NURSE PRACTITIONER

## 2020-04-16 RX ADMIN — LEVOTHYROXINE SODIUM 112 MCG: 112 TABLET ORAL at 06:06

## 2020-04-16 RX ADMIN — BUMETANIDE 2 MG: 2 TABLET ORAL at 09:47

## 2020-04-16 RX ADMIN — SENNOSIDES 8.6 MG: 8.6 TABLET, FILM COATED ORAL at 09:46

## 2020-04-16 RX ADMIN — DOCUSATE SODIUM 100 MG: 100 CAPSULE, LIQUID FILLED ORAL at 09:46

## 2020-04-16 RX ADMIN — PANTOPRAZOLE SODIUM 40 MG: 40 TABLET, DELAYED RELEASE ORAL at 06:06

## 2020-04-17 ENCOUNTER — TRANSITIONAL CARE MANAGEMENT (OUTPATIENT)
Dept: FAMILY MEDICINE CLINIC | Facility: CLINIC | Age: 80
End: 2020-04-17

## 2020-04-17 ENCOUNTER — TELEPHONE (OUTPATIENT)
Dept: NEUROSURGERY | Facility: CLINIC | Age: 80
End: 2020-04-17

## 2020-04-20 ENCOUNTER — TELEPHONE (OUTPATIENT)
Dept: NEUROSURGERY | Facility: CLINIC | Age: 80
End: 2020-04-20

## 2020-04-20 ENCOUNTER — TELEMEDICINE (OUTPATIENT)
Dept: NEUROSURGERY | Facility: CLINIC | Age: 80
End: 2020-04-20

## 2020-04-20 DIAGNOSIS — Z98.890 POST-OPERATIVE STATE: Primary | ICD-10-CM

## 2020-04-20 PROCEDURE — 99024 POSTOP FOLLOW-UP VISIT: CPT

## 2020-04-22 ENCOUNTER — TELEMEDICINE (OUTPATIENT)
Dept: NEPHROLOGY | Facility: CLINIC | Age: 80
End: 2020-04-22
Payer: MEDICARE

## 2020-04-22 VITALS
BODY MASS INDEX: 37 KG/M2 | DIASTOLIC BLOOD PRESSURE: 65 MMHG | HEIGHT: 61 IN | SYSTOLIC BLOOD PRESSURE: 142 MMHG | WEIGHT: 196 LBS

## 2020-04-22 DIAGNOSIS — N20.0 NEPHROLITHIASIS: ICD-10-CM

## 2020-04-22 DIAGNOSIS — I10 BENIGN ESSENTIAL HYPERTENSION: Primary | Chronic | ICD-10-CM

## 2020-04-22 DIAGNOSIS — I10 BENIGN ESSENTIAL HTN: ICD-10-CM

## 2020-04-22 DIAGNOSIS — M54.16 RADICULOPATHY OF LUMBAR REGION: ICD-10-CM

## 2020-04-22 DIAGNOSIS — D50.8 IRON DEFICIENCY ANEMIA SECONDARY TO INADEQUATE DIETARY IRON INTAKE: ICD-10-CM

## 2020-04-22 DIAGNOSIS — N18.30 CKD (CHRONIC KIDNEY DISEASE), STAGE III (HCC): Chronic | ICD-10-CM

## 2020-04-22 PROCEDURE — 99443 PR PHYS/QHP TELEPHONE EVALUATION 21-30 MIN: CPT | Performed by: NURSE PRACTITIONER

## 2020-04-22 RX ORDER — LOSARTAN POTASSIUM 25 MG/1
TABLET ORAL
Qty: 270 TABLET | Refills: 3 | Status: SHIPPED | OUTPATIENT
Start: 2020-04-22 | End: 2020-08-05 | Stop reason: SDUPTHER

## 2020-04-22 RX ORDER — SPIRONOLACTONE 25 MG/1
12.5 TABLET ORAL 2 TIMES DAILY
Qty: 45 TABLET | Refills: 3 | Status: SHIPPED | OUTPATIENT
Start: 2020-04-22 | End: 2020-08-05

## 2020-04-24 ENCOUNTER — TELEMEDICINE (OUTPATIENT)
Dept: OCCUPATIONAL THERAPY | Facility: CLINIC | Age: 80
End: 2020-04-24
Payer: MEDICARE

## 2020-04-24 DIAGNOSIS — I61.9 INTRAPARENCHYMAL HEMORRHAGE OF BRAIN (HCC): ICD-10-CM

## 2020-04-24 PROCEDURE — 97167 OT EVAL HIGH COMPLEX 60 MIN: CPT

## 2020-04-27 ENCOUNTER — TELEPHONE (OUTPATIENT)
Dept: SPEECH THERAPY | Facility: CLINIC | Age: 80
End: 2020-04-27

## 2020-04-29 ENCOUNTER — TELEMEDICINE (OUTPATIENT)
Dept: OCCUPATIONAL THERAPY | Facility: CLINIC | Age: 80
End: 2020-04-29
Payer: MEDICARE

## 2020-04-29 DIAGNOSIS — I61.9 INTRAPARENCHYMAL HEMORRHAGE OF BRAIN (HCC): Primary | ICD-10-CM

## 2020-04-29 PROCEDURE — 97530 THERAPEUTIC ACTIVITIES: CPT

## 2020-05-04 ENCOUNTER — OFFICE VISIT (OUTPATIENT)
Dept: PODIATRY | Facility: CLINIC | Age: 80
End: 2020-05-04
Payer: MEDICARE

## 2020-05-04 VITALS — SYSTOLIC BLOOD PRESSURE: 142 MMHG | HEART RATE: 74 BPM | DIASTOLIC BLOOD PRESSURE: 68 MMHG | RESPIRATION RATE: 18 BRPM

## 2020-05-04 DIAGNOSIS — L84 CORNS: ICD-10-CM

## 2020-05-04 DIAGNOSIS — M79.672 PAIN IN BOTH FEET: ICD-10-CM

## 2020-05-04 DIAGNOSIS — E11.42 DIABETIC POLYNEUROPATHY ASSOCIATED WITH TYPE 2 DIABETES MELLITUS (HCC): ICD-10-CM

## 2020-05-04 DIAGNOSIS — I70.209 PERIPHERAL ARTERIOSCLEROSIS (HCC): Primary | ICD-10-CM

## 2020-05-04 DIAGNOSIS — M79.671 PAIN IN BOTH FEET: ICD-10-CM

## 2020-05-04 PROCEDURE — 11056 PARNG/CUTG B9 HYPRKR LES 2-4: CPT | Performed by: PODIATRIST

## 2020-05-19 ENCOUNTER — TELEMEDICINE (OUTPATIENT)
Dept: NEUROSURGERY | Facility: CLINIC | Age: 80
End: 2020-05-19
Payer: MEDICARE

## 2020-05-19 DIAGNOSIS — Z48.811 ENCOUNTER FOR SURGICAL AFTERCARE FOLLOWING SURGERY OF NERVOUS SYSTEM: Primary | ICD-10-CM

## 2020-05-19 PROCEDURE — 99214 OFFICE O/P EST MOD 30 MIN: CPT | Performed by: NEUROLOGICAL SURGERY

## 2020-05-20 ENCOUNTER — TELEMEDICINE (OUTPATIENT)
Dept: OCCUPATIONAL THERAPY | Facility: CLINIC | Age: 80
End: 2020-05-20
Payer: MEDICARE

## 2020-05-20 DIAGNOSIS — I61.9 INTRAPARENCHYMAL HEMORRHAGE OF BRAIN (HCC): Primary | ICD-10-CM

## 2020-05-20 PROCEDURE — 97530 THERAPEUTIC ACTIVITIES: CPT

## 2020-05-26 DIAGNOSIS — I50.33 ACUTE ON CHRONIC DIASTOLIC CONGESTIVE HEART FAILURE (HCC): ICD-10-CM

## 2020-05-26 RX ORDER — BUMETANIDE 2 MG/1
2 TABLET ORAL DAILY
Qty: 135 TABLET | Refills: 3 | Status: SHIPPED | OUTPATIENT
Start: 2020-05-26 | End: 2021-11-08 | Stop reason: SDUPTHER

## 2020-06-19 ENCOUNTER — HOSPITAL ENCOUNTER (OUTPATIENT)
Dept: CT IMAGING | Facility: HOSPITAL | Age: 80
Discharge: HOME | End: 2020-06-19
Attending: NEUROLOGICAL SURGERY
Payer: MEDICARE

## 2020-06-19 DIAGNOSIS — Z48.811 ENCOUNTER FOR SURGICAL AFTERCARE FOLLOWING SURGERY OF NERVOUS SYSTEM: ICD-10-CM

## 2020-06-19 PROCEDURE — 70450 CT HEAD/BRAIN W/O DYE: CPT

## 2020-06-23 ENCOUNTER — LAB (OUTPATIENT)
Dept: LAB | Facility: CLINIC | Age: 80
End: 2020-06-23
Payer: MEDICARE

## 2020-06-23 ENCOUNTER — TELEPHONE (OUTPATIENT)
Dept: NEPHROLOGY | Facility: CLINIC | Age: 80
End: 2020-06-23

## 2020-06-23 DIAGNOSIS — D50.8 IRON DEFICIENCY ANEMIA SECONDARY TO INADEQUATE DIETARY IRON INTAKE: ICD-10-CM

## 2020-06-23 DIAGNOSIS — N20.0 NEPHROLITHIASIS: ICD-10-CM

## 2020-06-23 DIAGNOSIS — I10 BENIGN ESSENTIAL HYPERTENSION: ICD-10-CM

## 2020-06-23 DIAGNOSIS — E03.9 HYPOTHYROIDISM, UNSPECIFIED TYPE: ICD-10-CM

## 2020-06-23 DIAGNOSIS — M54.16 RADICULOPATHY OF LUMBAR REGION: ICD-10-CM

## 2020-06-23 DIAGNOSIS — N18.30 CKD (CHRONIC KIDNEY DISEASE), STAGE III (HCC): ICD-10-CM

## 2020-06-23 DIAGNOSIS — R73.03 PREDIABETES: ICD-10-CM

## 2020-06-23 DIAGNOSIS — E79.0 HYPERURICEMIA: ICD-10-CM

## 2020-06-23 DIAGNOSIS — E78.5 HYPERLIPIDEMIA, UNSPECIFIED HYPERLIPIDEMIA TYPE: ICD-10-CM

## 2020-06-23 DIAGNOSIS — I10 BENIGN ESSENTIAL HTN: ICD-10-CM

## 2020-06-23 LAB
ALBUMIN SERPL BCP-MCNC: 3.5 G/DL (ref 3.5–5)
ALP SERPL-CCNC: 116 U/L (ref 46–116)
ALT SERPL W P-5'-P-CCNC: 16 U/L (ref 12–78)
ANION GAP SERPL CALCULATED.3IONS-SCNC: 5 MMOL/L (ref 4–13)
AST SERPL W P-5'-P-CCNC: 12 U/L (ref 5–45)
BACTERIA UR QL AUTO: ABNORMAL /HPF
BASOPHILS # BLD AUTO: 0.02 THOUSANDS/ΜL (ref 0–0.1)
BASOPHILS NFR BLD AUTO: 0 % (ref 0–1)
BILIRUB SERPL-MCNC: 0.44 MG/DL (ref 0.2–1)
BILIRUB UR QL STRIP: NEGATIVE
BUN SERPL-MCNC: 30 MG/DL (ref 5–25)
CALCIUM SERPL-MCNC: 9.6 MG/DL (ref 8.3–10.1)
CHLORIDE SERPL-SCNC: 107 MMOL/L (ref 100–108)
CHOLEST SERPL-MCNC: 121 MG/DL (ref 50–200)
CLARITY UR: CLEAR
CO2 SERPL-SCNC: 29 MMOL/L (ref 21–32)
COLOR UR: YELLOW
CREAT SERPL-MCNC: 1.15 MG/DL (ref 0.6–1.3)
CREAT UR-MCNC: 56.1 MG/DL
EOSINOPHIL # BLD AUTO: 0.12 THOUSAND/ΜL (ref 0–0.61)
EOSINOPHIL NFR BLD AUTO: 2 % (ref 0–6)
ERYTHROCYTE [DISTWIDTH] IN BLOOD BY AUTOMATED COUNT: 15.2 % (ref 11.6–15.1)
GFR SERPL CREATININE-BSD FRML MDRD: 45 ML/MIN/1.73SQ M
GLUCOSE P FAST SERPL-MCNC: 96 MG/DL (ref 65–99)
GLUCOSE UR STRIP-MCNC: NEGATIVE MG/DL
HCT VFR BLD AUTO: 37 % (ref 34.8–46.1)
HDLC SERPL-MCNC: 50 MG/DL
HGB BLD-MCNC: 11.5 G/DL (ref 11.5–15.4)
HGB UR QL STRIP.AUTO: NEGATIVE
HYALINE CASTS #/AREA URNS LPF: ABNORMAL /LPF
IMM GRANULOCYTES # BLD AUTO: 0.02 THOUSAND/UL (ref 0–0.2)
IMM GRANULOCYTES NFR BLD AUTO: 0 % (ref 0–2)
KETONES UR STRIP-MCNC: NEGATIVE MG/DL
LDLC SERPL CALC-MCNC: 48 MG/DL (ref 0–100)
LEUKOCYTE ESTERASE UR QL STRIP: ABNORMAL
LYMPHOCYTES # BLD AUTO: 1.19 THOUSANDS/ΜL (ref 0.6–4.47)
LYMPHOCYTES NFR BLD AUTO: 23 % (ref 14–44)
MAGNESIUM SERPL-MCNC: 1.9 MG/DL (ref 1.6–2.6)
MCH RBC QN AUTO: 27.1 PG (ref 26.8–34.3)
MCHC RBC AUTO-ENTMCNC: 31.1 G/DL (ref 31.4–37.4)
MCV RBC AUTO: 87 FL (ref 82–98)
MONOCYTES # BLD AUTO: 0.45 THOUSAND/ΜL (ref 0.17–1.22)
MONOCYTES NFR BLD AUTO: 9 % (ref 4–12)
NEUTROPHILS # BLD AUTO: 3.32 THOUSANDS/ΜL (ref 1.85–7.62)
NEUTS SEG NFR BLD AUTO: 66 % (ref 43–75)
NITRITE UR QL STRIP: NEGATIVE
NON-SQ EPI CELLS URNS QL MICRO: ABNORMAL /HPF
NONHDLC SERPL-MCNC: 71 MG/DL
NRBC BLD AUTO-RTO: 0 /100 WBCS
PH UR STRIP.AUTO: 7 [PH]
PHOSPHATE SERPL-MCNC: 3.4 MG/DL (ref 2.3–4.1)
PLATELET # BLD AUTO: 159 THOUSANDS/UL (ref 149–390)
PMV BLD AUTO: 12 FL (ref 8.9–12.7)
POTASSIUM SERPL-SCNC: 4.1 MMOL/L (ref 3.5–5.3)
PROT SERPL-MCNC: 7.2 G/DL (ref 6.4–8.2)
PROT UR STRIP-MCNC: NEGATIVE MG/DL
PROT UR-MCNC: 10 MG/DL
PROT/CREAT UR: 0.18 MG/G{CREAT} (ref 0–0.1)
PTH-INTACT SERPL-MCNC: 78.6 PG/ML (ref 18.4–80.1)
RBC # BLD AUTO: 4.25 MILLION/UL (ref 3.81–5.12)
RBC #/AREA URNS AUTO: ABNORMAL /HPF
SODIUM SERPL-SCNC: 141 MMOL/L (ref 136–145)
SP GR UR STRIP.AUTO: 1.01 (ref 1–1.03)
TRIGL SERPL-MCNC: 114 MG/DL
TSH SERPL DL<=0.05 MIU/L-ACNC: 0.77 UIU/ML (ref 0.36–3.74)
UROBILINOGEN UR QL STRIP.AUTO: 0.2 E.U./DL
WBC # BLD AUTO: 5.12 THOUSAND/UL (ref 4.31–10.16)
WBC #/AREA URNS AUTO: ABNORMAL /HPF

## 2020-06-23 PROCEDURE — 84100 ASSAY OF PHOSPHORUS: CPT

## 2020-06-23 PROCEDURE — 83735 ASSAY OF MAGNESIUM: CPT

## 2020-06-23 PROCEDURE — 83036 HEMOGLOBIN GLYCOSYLATED A1C: CPT

## 2020-06-23 PROCEDURE — 84443 ASSAY THYROID STIM HORMONE: CPT

## 2020-06-23 PROCEDURE — 85025 COMPLETE CBC W/AUTO DIFF WBC: CPT

## 2020-06-23 PROCEDURE — 81001 URINALYSIS AUTO W/SCOPE: CPT

## 2020-06-23 PROCEDURE — 36415 COLL VENOUS BLD VENIPUNCTURE: CPT

## 2020-06-23 PROCEDURE — 83970 ASSAY OF PARATHORMONE: CPT

## 2020-06-23 PROCEDURE — 84156 ASSAY OF PROTEIN URINE: CPT

## 2020-06-23 PROCEDURE — 80053 COMPREHEN METABOLIC PANEL: CPT

## 2020-06-23 PROCEDURE — 80061 LIPID PANEL: CPT

## 2020-06-23 PROCEDURE — 82570 ASSAY OF URINE CREATININE: CPT

## 2020-06-24 LAB
EST. AVERAGE GLUCOSE BLD GHB EST-MCNC: 126 MG/DL
HBA1C MFR BLD: 6 %

## 2020-07-09 ENCOUNTER — TELEMEDICINE (OUTPATIENT)
Dept: NEUROSURGERY | Facility: CLINIC | Age: 80
End: 2020-07-09
Payer: MEDICARE

## 2020-07-09 VITALS — BODY MASS INDEX: 35.87 KG/M2 | HEIGHT: 61 IN | WEIGHT: 190 LBS

## 2020-07-09 DIAGNOSIS — Z48.811 ENCOUNTER FOR SURGICAL AFTERCARE FOLLOWING SURGERY OF NERVOUS SYSTEM: Primary | ICD-10-CM

## 2020-07-09 PROCEDURE — 1160F RVW MEDS BY RX/DR IN RCRD: CPT | Performed by: NEUROLOGICAL SURGERY

## 2020-07-09 PROCEDURE — 3077F SYST BP >= 140 MM HG: CPT | Performed by: NEUROLOGICAL SURGERY

## 2020-07-09 PROCEDURE — 4040F PNEUMOC VAC/ADMIN/RCVD: CPT | Performed by: NEUROLOGICAL SURGERY

## 2020-07-09 PROCEDURE — 3008F BODY MASS INDEX DOCD: CPT | Performed by: NEUROLOGICAL SURGERY

## 2020-07-09 PROCEDURE — 3078F DIAST BP <80 MM HG: CPT | Performed by: NEUROLOGICAL SURGERY

## 2020-07-09 PROCEDURE — 3044F HG A1C LEVEL LT 7.0%: CPT | Performed by: NEUROLOGICAL SURGERY

## 2020-07-09 PROCEDURE — 3066F NEPHROPATHY DOC TX: CPT | Performed by: NEUROLOGICAL SURGERY

## 2020-07-09 PROCEDURE — 1036F TOBACCO NON-USER: CPT | Performed by: NEUROLOGICAL SURGERY

## 2020-07-09 PROCEDURE — 99212 OFFICE O/P EST SF 10 MIN: CPT | Performed by: NEUROLOGICAL SURGERY

## 2020-07-09 NOTE — PROGRESS NOTES
Virtual Regular Visit      Assessment/Plan: This is an 58-year-old female who is status post a decompressive craniectomy evacuation of hematoma and replacement of the cranial flap  She is asymptomatic from her head at this point and is doing very well  I did tell her that she could drive short distances under ideal conditions  Problem List Items Addressed This Visit     None               Reason for visit is   Chief Complaint   Patient presents with    Virtual Regular Visit     4 week f/u with [facility] 6/19/20 s/p evacuation of that hemorrhage on 2/11/20 and replacement of a cranial flap on 4/6/20  Encounter provider Epifanio Pinzon MD    Provider located at [address] [address] [phone]      Recent Visits  No visits were found meeting these conditions  Showing recent visits within past 7 days and meeting all other requirements     Today's Visits  Date Type Provider Dept   07/09/20 Telemedicine Epifanio Pinzon, [facility] [place] visits and meeting all other requirements     Future Appointments  Date Type Provider Dept   07/10/20 Office Visit Megan Alejandre MD [facility]   Showing future appointments within next 150 days and meeting all other requirements        The patient was identified by name and date of birth  Yoko Price was informed that this is a telemedicine visit and that the visit is being conducted through watAgame  My office door was closed  No one else was in the room  She acknowledged consent and understanding of privacy and security of the video platform  The patient has agreed to participate and understands they can discontinue the visit at any time  Patient is aware this is a billable service  Subjective  Yoko Price is a [de-identified] y o  female denies headaches  She has no nausea or vomiting    She does have some complains of low back pain and knee pain and I asked her to speak with her physician concerning this  She has no changes with her vision  She is able to ambulate and maintain her balance independently  Past Medical History:   Diagnosis Date    Anxiety     Arthritis     joints    Cataract     Disease of thyroid gland     Eczema     GERD (gastroesophageal reflux disease)     Gout     Heart failure (HCC)     Hepatitis     Hiatal hernia     Hypertension     Onychomycosis     last assessed: 16    Orthopnea     resolved: 16    Pseudogout of left wrist     Sleep apnea     wears CPAP    Stroke [facility])        Past Surgical History:   Procedure Laterality Date    ABDOMINAL SURGERY          BRAIN HEMATOMA EVACUATION Right 2020    Procedure: Right decompressive craniectomy and evacuation of intraparenchymal hematoma; Surgeon: Danitza Cintron MD;  [facility];  Service: Neurosurgery    BREAST SURGERY Right     cyst removal    CARPAL TUNNEL RELEASE Left     CARPAL TUNNEL RELEASE Right     CATARACT EXTRACTION       SECTION  1960    x1    COLONOSCOPY N/A 2018    Procedure: COLONOSCOPY;  Surgeon: Robby Gómez MD;  Location: [facility] GI LAB; Service: Gastroenterology    DILATION AND CURETTAGE OF UTERUS  1967    EYE SURGERY Left 2006    cataracts    HERNIA REPAIR      umbilical hernia    INCISIONAL HERNIA REPAIR      incarcerated    KNEE ARTHROSCOPY Right     GA REPLACE SKULL PLATE/FLAP Right 795    Procedure: right frontotemporal replacement cranioplasty;  Surgeon: Danitza Cintron MD;  [facility];  Service: Neurosurgery    GA XCAPSL CTRC RMVL INSJ IO LENS PROSTH W/O ECP Right 3/27/2017    Procedure: EXTRACTION EXTRACAPSULAR CATARACT PHACO INTRAOCULAR LENS (IOL);   Surgeon: Nida Novoa MD;  Location: [facility] MAIN OR;  Service: Ophthalmology       Current Outpatient Medications   Medication Sig Dispense Refill    amLODIPine (NORVASC) 10 mg tablet Take 1 tablet (10 mg total) by mouth daily 90 tablet 1    atorvastatin (LIPITOR) 40 mg tablet Take 1 tablet (40 mg total) by mouth every evening 90 tablet 1    bumetanide (BUMEX) 2 mg tablet Take 1 tablet (2 mg total) by mouth daily 135 tablet 3    levothyroxine 112 mcg tablet Take 1 tablet (112 mcg total) by mouth daily 90 tablet 1    losartan (COZAAR) 25 mg tablet Take 2 tab in AM and 1 tab in  tablet 3    omeprazole (PriLOSEC) 20 mg delayed release capsule Take 1 capsule (20 mg total) by mouth every morning 90 capsule 1    spironolactone (ALDACTONE) 25 mg tablet Take 0 5 tablets (12 5 mg total) by mouth 2 (two) times a day (Patient taking differently: Take 12 5 mg by mouth daily ) 45 tablet 3     No current facility-administered medications for this visit  No Known Allergies    Review of Systems   Constitutional: Negative  HENT: Negative  Eyes: Positive for visual disturbance (blurred vision occasionally)  Respiratory: Negative  Cardiovascular: Negative  Gastrointestinal: Negative  Endocrine: Negative  Genitourinary: Negative  Musculoskeletal: Positive for back pain (can't stand for too long)  Skin: Negative  Allergic/Immunologic: Negative  Neurological: Positive for weakness (right side) and numbness (after stroke had it on her right side)  Hematological: Negative  Psychiatric/Behavioral: Negative  I reviewed the ROS  Video Exam    Vitals:    07/09/20 1141   Weight: 86 2 kg (190 lb)   Height: 5' 1" (1 549 m)       Physical Exam   Awake and alert  Extraocular movements were intact  Speech is clear and comprehensible  She is aware of person date and time as well as her situation    I spent Fifteen minutes directly with the patient during this visit      VIRTUAL VISIT [address] [place] [address] [address] acknowledges that she has consented to an online visit or consultation   She understands that the online visit is based solely on information provided by her, and that, in the absence of a face-to-face physical evaluation by the physician, the diagnosis she receives is both limited and provisional in terms of accuracy and completeness  This is not intended to replace a full medical face-to-face evaluation by the physician  Kaia Ulrich understands and accepts these terms

## 2020-07-10 ENCOUNTER — OFFICE VISIT (OUTPATIENT)
Dept: FAMILY MEDICINE CLINIC | Facility: CLINIC | Age: 80
End: 2020-07-10
Payer: MEDICARE

## 2020-07-10 VITALS
SYSTOLIC BLOOD PRESSURE: 125 MMHG | DIASTOLIC BLOOD PRESSURE: 68 MMHG | TEMPERATURE: 98.2 F | OXYGEN SATURATION: 96 % | WEIGHT: 205 LBS | HEART RATE: 78 BPM | BODY MASS INDEX: 38.73 KG/M2

## 2020-07-10 DIAGNOSIS — E78.5 HYPERLIPIDEMIA, UNSPECIFIED HYPERLIPIDEMIA TYPE: ICD-10-CM

## 2020-07-10 DIAGNOSIS — H10.32 ACUTE BACTERIAL CONJUNCTIVITIS OF LEFT EYE: ICD-10-CM

## 2020-07-10 DIAGNOSIS — Z00.00 MEDICARE ANNUAL WELLNESS VISIT, SUBSEQUENT: ICD-10-CM

## 2020-07-10 DIAGNOSIS — I10 BENIGN ESSENTIAL HTN: ICD-10-CM

## 2020-07-10 DIAGNOSIS — K21.9 GASTROESOPHAGEAL REFLUX DISEASE WITHOUT ESOPHAGITIS: ICD-10-CM

## 2020-07-10 DIAGNOSIS — I10 BENIGN ESSENTIAL HYPERTENSION: ICD-10-CM

## 2020-07-10 DIAGNOSIS — E03.9 HYPOTHYROIDISM, UNSPECIFIED TYPE: ICD-10-CM

## 2020-07-10 DIAGNOSIS — Z23 NEED FOR VACCINATION: ICD-10-CM

## 2020-07-10 DIAGNOSIS — I50.32 CHRONIC DIASTOLIC (CONGESTIVE) HEART FAILURE (HCC): ICD-10-CM

## 2020-07-10 DIAGNOSIS — D50.8 IRON DEFICIENCY ANEMIA SECONDARY TO INADEQUATE DIETARY IRON INTAKE: Primary | ICD-10-CM

## 2020-07-10 DIAGNOSIS — L30.9 ECZEMA, UNSPECIFIED TYPE: ICD-10-CM

## 2020-07-10 DIAGNOSIS — R73.03 PREDIABETES: ICD-10-CM

## 2020-07-10 PROCEDURE — 4010F ACE/ARB THERAPY RXD/TAKEN: CPT | Performed by: FAMILY MEDICINE

## 2020-07-10 PROCEDURE — 3078F DIAST BP <80 MM HG: CPT | Performed by: FAMILY MEDICINE

## 2020-07-10 PROCEDURE — 1125F AMNT PAIN NOTED PAIN PRSNT: CPT | Performed by: FAMILY MEDICINE

## 2020-07-10 PROCEDURE — 1036F TOBACCO NON-USER: CPT | Performed by: FAMILY MEDICINE

## 2020-07-10 PROCEDURE — G0439 PPPS, SUBSEQ VISIT: HCPCS | Performed by: FAMILY MEDICINE

## 2020-07-10 PROCEDURE — 90732 PPSV23 VACC 2 YRS+ SUBQ/IM: CPT | Performed by: FAMILY MEDICINE

## 2020-07-10 PROCEDURE — 4040F PNEUMOC VAC/ADMIN/RCVD: CPT | Performed by: FAMILY MEDICINE

## 2020-07-10 PROCEDURE — 1160F RVW MEDS BY RX/DR IN RCRD: CPT | Performed by: FAMILY MEDICINE

## 2020-07-10 PROCEDURE — 99215 OFFICE O/P EST HI 40 MIN: CPT | Performed by: FAMILY MEDICINE

## 2020-07-10 PROCEDURE — 3066F NEPHROPATHY DOC TX: CPT | Performed by: FAMILY MEDICINE

## 2020-07-10 PROCEDURE — 1170F FXNL STATUS ASSESSED: CPT | Performed by: FAMILY MEDICINE

## 2020-07-10 PROCEDURE — G0009 ADMIN PNEUMOCOCCAL VACCINE: HCPCS | Performed by: FAMILY MEDICINE

## 2020-07-10 PROCEDURE — 3074F SYST BP LT 130 MM HG: CPT | Performed by: FAMILY MEDICINE

## 2020-07-10 PROCEDURE — 3044F HG A1C LEVEL LT 7.0%: CPT | Performed by: FAMILY MEDICINE

## 2020-07-10 RX ORDER — ATORVASTATIN CALCIUM 40 MG/1
40 TABLET, FILM COATED ORAL EVERY EVENING
Qty: 90 TABLET | Refills: 1 | Status: SHIPPED | OUTPATIENT
Start: 2020-07-10 | End: 2020-12-17 | Stop reason: SDUPTHER

## 2020-07-10 RX ORDER — AMLODIPINE BESYLATE 10 MG/1
10 TABLET ORAL DAILY
Qty: 90 TABLET | Refills: 1 | Status: SHIPPED | OUTPATIENT
Start: 2020-07-10 | End: 2020-12-01 | Stop reason: SDUPTHER

## 2020-07-10 RX ORDER — LEVOTHYROXINE SODIUM 112 UG/1
112 TABLET ORAL DAILY
Qty: 90 TABLET | Refills: 1 | Status: SHIPPED | OUTPATIENT
Start: 2020-07-10 | End: 2020-08-13

## 2020-07-10 RX ORDER — TOBRAMYCIN 3 MG/ML
1 SOLUTION/ DROPS OPHTHALMIC 2 TIMES DAILY
Qty: 1 BOTTLE | Refills: 0 | Status: SHIPPED | OUTPATIENT
Start: 2020-07-10 | End: 2020-07-10 | Stop reason: SDUPTHER

## 2020-07-10 RX ORDER — OMEPRAZOLE 20 MG/1
20 CAPSULE, DELAYED RELEASE ORAL EVERY MORNING
Qty: 90 CAPSULE | Refills: 1 | Status: SHIPPED | OUTPATIENT
Start: 2020-07-10 | End: 2020-09-21 | Stop reason: SDUPTHER

## 2020-07-10 RX ORDER — TOBRAMYCIN 3 MG/ML
1 SOLUTION/ DROPS OPHTHALMIC 2 TIMES DAILY
Qty: 1 BOTTLE | Refills: 0 | Status: SHIPPED | OUTPATIENT
Start: 2020-07-10 | End: 2020-07-17 | Stop reason: SDUPTHER

## 2020-07-10 NOTE — ASSESSMENT & PLAN NOTE
Wt Readings from Last 3 Encounters:   07/10/20 93 kg (205 lb)   07/09/20 86 2 kg (190 lb)   04/22/20 88 9 kg (196 lb)     Patient denies any symptoms of chest pain or shortness of breath  Continue management per Cardiology

## 2020-07-10 NOTE — ASSESSMENT & PLAN NOTE
Well controlled with intermittent use of topical clobetasol  Continue same  Patient made aware of the common side effects of chronic steroid use

## 2020-07-10 NOTE — PROGRESS NOTES
Subjective:      Patient ID: Yonas Murray is a [de-identified] y o  female  Hypertension   This is a chronic problem  The current episode started more than 1 year ago  The problem is controlled  Pertinent negatives include no chest pain, headaches, palpitations or shortness of breath  Treatments tried: Amlodipine 10 milligram, losartan 25 milligram 2 tablets in the morning and 1 tablet at night  The current treatment provides significant improvement  There are no compliance problems  Identifiable causes of hypertension include a thyroid problem  Thyroid Problem   Presents for follow-up visit  Patient reports no depressed mood, diaphoresis, heat intolerance, palpitations or tremors  The symptoms have been stable (Levothyroxine 112 microgram)  Her past medical history is significant for hyperlipidemia  Heartburn   She reports no chest pain  This is a chronic problem  The current episode started more than 1 year ago  The problem occurs occasionally  The symptoms are aggravated by certain foods  There are no known risk factors  She has tried a PPI for the symptoms  The treatment provided significant relief  Hyperlipidemia   This is a chronic problem  The current episode started more than 1 year ago  The problem is controlled  Recent lipid tests were reviewed and are normal  Pertinent negatives include no chest pain, myalgias or shortness of breath  Current antihyperlipidemic treatment includes statins  The current treatment provides significant improvement of lipids  There are no compliance problems  Risk factors for coronary artery disease include dyslipidemia, hypertension, obesity and post-menopausal    Conjunctivitis    The current episode started 2 days ago  The onset was sudden  The problem has been unchanged  The problem is moderate  Associated symptoms include eye discharge and eye redness (left eye)  Pertinent negatives include no headaches  The eye pain is moderate  The left eye is affected   The eye pain is not associated with movement  The eyelid exhibits swelling  Patient requesting refill of clobetasol ointment which she has used in the past intermittently for patches of dry skin on her hand  Patient uses the ointment infrequently for minimal period of 2 to 3 days and then stops as symptoms improve  Past Medical History:   Diagnosis Date    Anxiety     Arthritis     joints    Cataract     Disease of thyroid gland     Eczema     GERD (gastroesophageal reflux disease)     Gout     Heart failure (HCC)     Hepatitis     Hiatal hernia     Hypertension     Onychomycosis     last assessed: 16    Orthopnea     resolved: 16    Pseudogout of left wrist     Sleep apnea     wears CPAP    Stroke [facility])        Family History   Problem Relation Age of Onset    Diabetes Mother     Coronary artery disease Mother     Arthritis Mother     Hypertension Mother     Hypertension Father     Diabetes Brother     Diabetes Son     Arthritis Family        Past Surgical History:   Procedure Laterality Date    ABDOMINAL SURGERY          BRAIN HEMATOMA EVACUATION Right 2020    Procedure: Right decompressive craniectomy and evacuation of intraparenchymal hematoma; Surgeon: Nakia Swanson MD;  Location: [facility] OR;  Service: Neurosurgery    BREAST SURGERY Right     cyst removal    CARPAL TUNNEL RELEASE Left     CARPAL TUNNEL RELEASE Right     CATARACT EXTRACTION       SECTION  1960    x1    COLONOSCOPY N/A 2018    Procedure: COLONOSCOPY;  Surgeon: Charleen Pickering MD;  Location: [facility];   Service: Gastroenterology    DILATION AND CURETTAGE OF UTERUS  1967    EYE SURGERY Left     cataracts    HERNIA REPAIR      umbilical hernia    INCISIONAL HERNIA REPAIR      incarcerated    KNEE ARTHROSCOPY Right     SD REPLACE SKULL PLATE/FLAP Right 8308    Procedure: right frontotemporal replacement cranioplasty;  Surgeon: Nakia Swanson MD;  Location: [facility] OR;  Service: Neurosurgery    IN XCAPSL CTRC RMVL INSJ IO LENS PROSTH W/O ECP Right 3/27/2017    Procedure: EXTRACTION EXTRACAPSULAR CATARACT PHACO INTRAOCULAR LENS (IOL); Surgeon: Gideon Cruz MD;  Location: [facility] MAIN OR;  Service: Ophthalmology        reports that she has never smoked  She has never used smokeless tobacco  She reports that she drank alcohol  She reports that she does not use drugs  Current Outpatient Medications:     amLODIPine (NORVASC) 10 mg tablet, Take 1 tablet (10 mg total) by mouth daily, Disp: 90 tablet, Rfl: 1    atorvastatin (LIPITOR) 40 mg tablet, Take 1 tablet (40 mg total) by mouth every evening, Disp: 90 tablet, Rfl: 1    bumetanide (BUMEX) 2 mg tablet, Take 1 tablet (2 mg total) by mouth daily, Disp: 135 tablet, Rfl: 3    levothyroxine 112 mcg tablet, Take 1 tablet (112 mcg total) by mouth daily, Disp: 90 tablet, Rfl: 1    losartan (COZAAR) 25 mg tablet, Take 2 tab in AM and 1 tab in PM, Disp: 270 tablet, Rfl: 3    omeprazole (PriLOSEC) 20 mg delayed release capsule, Take 1 capsule (20 mg total) by mouth every morning, Disp: 90 capsule, Rfl: 1    spironolactone (ALDACTONE) 25 mg tablet, Take 0 5 tablets (12 5 mg total) by mouth 2 (two) times a day (Patient taking differently: Take 12 5 mg by mouth daily ), Disp: 45 tablet, Rfl: 3    The following portions of the patient's history were reviewed and updated as appropriate: allergies, current medications, past family history, past medical history, past social history, past surgical history and problem list     Review of Systems   Constitutional: Negative  Negative for diaphoresis  Eyes: Positive for discharge and redness (left eye)  Respiratory: Negative  Negative for shortness of breath  Cardiovascular: Negative  Negative for chest pain and palpitations  Gastrointestinal: Negative  Endocrine: Negative  Negative for heat intolerance  Genitourinary: Negative  Musculoskeletal: Negative    Negative for myalgias  Neurological: Negative  Negative for tremors and headaches  Psychiatric/Behavioral: Negative  Objective:    /68   Pulse 78   Temp 98 2 °F (36 8 °C)   Wt 93 kg (205 lb)   SpO2 96%   BMI 38 73 kg/m²      Physical Exam   Constitutional: She is oriented to person, place, and time  She appears well-developed and well-nourished  Eyes: Pupils are equal, round, and reactive to light  EOM are normal  Left eye exhibits discharge  Cardiovascular: Normal rate, regular rhythm and normal heart sounds  Pulmonary/Chest: Effort normal and breath sounds normal    Abdominal: Soft  Bowel sounds are normal    Neurological: She is alert and oriented to person, place, and time  Psychiatric: Her behavior is normal  Judgment normal          Recent Results (from the past 1008 hour(s))   TSH, 3rd generation with Free T4 reflex    Collection Time: 06/23/20  7:57 AM   Result Value Ref Range    TSH 3RD GENERATON 0 774 0 358 - 3 740 uIU/mL   Comprehensive metabolic panel    Collection Time: 06/23/20  7:57 AM   Result Value Ref Range    Sodium 141 136 - 145 mmol/L    Potassium 4 1 3 5 - 5 3 mmol/L    Chloride 107 100 - 108 mmol/L    CO2 29 21 - 32 mmol/L    ANION GAP 5 4 - 13 mmol/L    BUN 30 (H) 5 - 25 mg/dL    Creatinine 1 15 0 60 - 1 30 mg/dL    Glucose, Fasting 96 65 - 99 mg/dL    Calcium 9 6 8 3 - 10 1 mg/dL    AST 12 5 - 45 U/L    ALT 16 12 - 78 U/L    Alkaline Phosphatase 116 46 - 116 U/L    Total Protein 7 2 6 4 - 8 2 g/dL    Albumin 3 5 3 5 - 5 0 g/dL    Total Bilirubin 0 44 0 20 - 1 00 mg/dL    eGFR 45 ml/min/1 73sq m   Lipid panel    Collection Time: 06/23/20  7:57 AM   Result Value Ref Range    Cholesterol 121 50 - 200 mg/dL    Triglycerides 114 <=150 mg/dL    HDL, Direct 50 >=40 mg/dL    LDL Calculated 48 0 - 100 mg/dL    Non-HDL-Chol (CHOL-HDL) 71 mg/dl   Hemoglobin A1C    Collection Time: 06/23/20  7:57 AM   Result Value Ref Range    Hemoglobin A1C 6 0 (H) Normal 3 8-5 6%;  PreDiabetic 5 7-6 4%;  Diabetic >=6 5%; Glycemic control for adults with diabetes <7 0% %     mg/dl   CBC and differential    Collection Time: 06/23/20  7:57 AM   Result Value Ref Range    WBC 5 12 4 31 - 10 16 Thousand/uL    RBC 4 25 3 81 - 5 12 Million/uL    Hemoglobin 11 5 11 5 - 15 4 g/dL    Hematocrit 37 0 34 8 - 46 1 %    MCV 87 82 - 98 fL    MCH 27 1 26 8 - 34 3 pg    MCHC 31 1 (L) 31 4 - 37 4 g/dL    RDW 15 2 (H) 11 6 - 15 1 %    MPV 12 0 8 9 - 12 7 fL    Platelets 655 265 - 978 Thousands/uL    nRBC 0 /100 WBCs    Neutrophils Relative 66 43 - 75 %    Immat GRANS % 0 0 - 2 %    Lymphocytes Relative 23 14 - 44 %    Monocytes Relative 9 4 - 12 %    Eosinophils Relative 2 0 - 6 %    Basophils Relative 0 0 - 1 %    Neutrophils Absolute 3 32 1 85 - 7 62 Thousands/µL    Immature Grans Absolute 0 02 0 00 - 0 20 Thousand/uL    Lymphocytes Absolute 1 19 0 60 - 4 47 Thousands/µL    Monocytes Absolute 0 45 0 17 - 1 22 Thousand/µL    Eosinophils Absolute 0 12 0 00 - 0 61 Thousand/µL    Basophils Absolute 0 02 0 00 - 0 10 Thousands/µL   Magnesium    Collection Time: 06/23/20  7:57 AM   Result Value Ref Range    Magnesium 1 9 1 6 - 2 6 mg/dL   Phosphorus    Collection Time: 06/23/20  7:57 AM   Result Value Ref Range    Phosphorus 3 4 2 3 - 4 1 mg/dL   Protein / creatinine ratio, urine    Collection Time: 06/23/20  7:57 AM   Result Value Ref Range    Creatinine, Ur 56 1 mg/dL    Protein Urine Random 10 mg/dL    Prot/Creat Ratio, Ur 0 18 (H) 0 00 - 0 10   PTH, intact    Collection Time: 06/23/20  7:57 AM   Result Value Ref Range    PTH 78 6 18 4 - 80 1 pg/mL   Urinalysis with microscopic    Collection Time: 06/23/20  7:57 AM   Result Value Ref Range    Clarity, UA Clear     Color, UA Yellow     Specific [place], UA 1 013 1 003 - 1 030    pH, UA 7 0 4 5, 5 0, 5 5, 6 0, 6 5, 7 0, 7 5, 8 0    Glucose, UA Negative Negative mg/dl    Ketones, UA Negative Negative mg/dl    Blood, UA Negative Negative    Protein, UA Negative Negative mg/dl    Nitrite, UA Negative Negative    Bilirubin, UA Negative Negative    Urobilinogen, UA 0 2 0 2, 1 0 E U /dl E U /dl    Leukocytes, UA Moderate (A) Negative    WBC, UA 10-20 (A) None Seen, 0-5, 5-55, 5-65 /hpf    RBC, UA None Seen None Seen, 0-5 /hpf    Hyaline Casts, UA 3-5 (A) None Seen /lpf    Bacteria, UA None Seen None Seen, Occasional /hpf    Epithelial Cells Moderate (A) None Seen, Occasional /hpf       Assessment/Plan:         Problem List Items Addressed This Visit        Digestive    Gastroesophageal reflux disease without esophagitis (Chronic)     Stable on omeprazole 20 milligram   Continue same  Relevant Medications    omeprazole (PriLOSEC) 20 mg delayed release capsule       Endocrine    Hypothyroidism (Chronic)     Stable on levothyroxine 112 microgram   Continue same  Metabolic labs at 6 month interval follow-up thereafter  Relevant Medications    levothyroxine 112 mcg tablet    Other Relevant Orders    TSH, 3rd generation with Free T4 reflex       Cardiovascular and Mediastinum    Benign essential HTN (Chronic)     Stable on amlodipine 10 milligram, losartan 25 milligram 2 tablets in the morning 1 tablet at night  Relevant Medications    amLODIPine (NORVASC) 10 mg tablet    Chronic diastolic (congestive) heart failure (HCC)     Wt Readings from Last 3 Encounters:   07/10/20 93 kg (205 lb)   07/09/20 86 2 kg (190 lb)   04/22/20 88 9 kg (196 lb)     Patient denies any symptoms of chest pain or shortness of breath  Continue management per Cardiology  Relevant Medications    amLODIPine (NORVASC) 10 mg tablet       Musculoskeletal and Integument    Eczema     Well controlled with intermittent use of topical clobetasol  Continue same  Patient made aware of the common side effects of chronic steroid use              Other    Hyperlipemia     Stable on atorvastatin  40 milligram          Relevant Medications    atorvastatin (LIPITOR) 40 mg tablet    Other Relevant Orders    Lipid panel    Iron deficiency anemia secondary to inadequate dietary iron intake - Primary     Hemoglobin improved  Continue iron rich diet  Continue monitoring  Relevant Orders    CBC and differential    Prediabetes     A1c 6  Continue low-calorie diet  Continue monitoring         Relevant Orders    Hemoglobin A1C    BMI 38 0-38 9,adult     BMI Counseling: Body mass index is 38 73 kg/m²  The BMI is above normal  Nutrition recommendations include reducing portion sizes, decreasing overall calorie intake, 3-5 servings of fruits/vegetables daily and reducing fast food intake  Exercise recommendations include moderate aerobic physical activity for 150 minutes/week  Acute bacterial conjunctivitis of left eye    Relevant Medications    tobramycin (TOBREX) 0 3 % SOLN    Need for vaccination    Relevant Orders    PNEUMOCOCCAL POLYSACCHARIDE VACCINE 23-VALENT =>1YO SQ IM (Completed)        I have spent 40 minutes with Patient  today in which greater than 50% of this time was spent in counseling/coordination of care regarding Diagnostic results, Prognosis, Risks and benefits of tx options, Intructions for management, Patient and family education, Importance of tx compliance, Risk factor reductions and Impressions

## 2020-07-10 NOTE — ASSESSMENT & PLAN NOTE
Stable on amlodipine 10 milligram, losartan 25 milligram 2 tablets in the morning 1 tablet at night

## 2020-07-10 NOTE — ASSESSMENT & PLAN NOTE
BMI Counseling: Body mass index is 38 73 kg/m²  The BMI is above normal  Nutrition recommendations include reducing portion sizes, decreasing overall calorie intake, 3-5 servings of fruits/vegetables daily and reducing fast food intake  Exercise recommendations include moderate aerobic physical activity for 150 minutes/week

## 2020-07-10 NOTE — PATIENT INSTRUCTIONS
In the next few weeks you may receive a [employer] survey regarding your most recent clinic visit with us.  Please take a few moments out of your day to accurately evaluate your visit.  We strive to provide you with the best medical care. Thank you for your time and we look forward to your next visit.     Results: If you recently had testing performed and your results are normal, we will notify you of your results in a letter. If we need to contact you regarding any abnormal results, we will make 2 attempts to reach you at the number you have listed during your office visit today. If we are unable to reach you, a letter with your results and any further instructions will be mailed to your home.    Refills: If you need a refill of your medication, please contact your pharmacy FIRST. You may have refills on file with them; if not, they will contact our office to refill the prescription on your behalf.     [facility] Hours:  Monday - Thursday: 7:00 am - 6:00 pm  Friday: 7:00 am - 5:00 pm  Saturday: 7:00 am - noon    Please do not hesitate to call our office with any questions or concerns AT [phone].       Medicare Preventive Visit Patient Instructions  Thank you for completing your Welcome to Medicare Visit or Medicare Annual Wellness Visit today  Your next wellness visit will be due in one year (7/10/2021)  The screening/preventive services that you may require over the next 5-10 years are detailed below  Some tests may not apply to you based off risk factors and/or age  Screening tests ordered at today's visit but not completed yet may show as past due  Also, please note that scanned in results may not display below  Preventive Screenings:  Service Recommendations Previous Testing/Comments   Colorectal Cancer Screening  * Colonoscopy    * Fecal Occult Blood Test (FOBT)/Fecal Immunochemical Test (FIT)  * Fecal DNA/Cologuard Test  * Flexible Sigmoidoscopy Age: 54-65 years old   Colonoscopy: every 10 years (may be performed more frequently if at higher risk)  OR  FOBT/FIT: every 1 year  OR  Cologuard: every 3 years  OR  Sigmoidoscopy: every 5 years  Screening may be recommended earlier than age 48 if at higher risk for colorectal cancer  Also, an individualized decision between you and your healthcare provider will decide whether screening between the ages of 74-80 would be appropriate  Colonoscopy: Not on file  FOBT/FIT: Not on file  Cologuard: Not on file  Sigmoidoscopy: Not on file    Risks and Benefits Discussed  Screening Not Indicated     Breast Cancer Screening Age: 36 years old  Frequency: every 1-2 years  Not required if history of left and right mastectomy Mammogram: 05/23/2017    Risks and Benefits Discussed  Screening Not Indicated   Cervical Cancer Screening Between the ages of 21-29, pap smear recommended once every 3 years  Between the ages of 33-67, can perform pap smear with HPV co-testing every 5 years     Recommendations may differ for women with a history of total hysterectomy, cervical cancer, or abnormal pap smears in past  Pap Smear: Not on file    Screening Not Indicated   Hepatitis C Screening Once for adults born between 1945 and 1965  More frequently in patients at high risk for Hepatitis C Hep C Antibody: Not on file    Patient Declines   Diabetes Screening 1-2 times per year if you're at risk for diabetes or have pre-diabetes Fasting glucose: 96 mg/dL   A1C: 6 0 %    Screening Not Indicated  History Diabetes   Cholesterol Screening Once every 5 years if you don't have a lipid disorder  May order more often based on risk factors  Lipid panel: 06/23/2020    Screening Not Indicated  History Lipid Disorder     Other Preventive Screenings Covered by Medicare:  1  Abdominal Aortic Aneurysm (AAA) Screening: covered once if your at risk  You're considered to be at risk if you have a family history of AAA  2  Lung Cancer Screening: covers low dose CT scan once per year if you meet all of the following conditions: (1) Age 50-69; (2) No signs or symptoms of lung cancer; (3) Current smoker or have quit smoking within the last 15 years; (4) You have a tobacco smoking history of at least 30 pack years (packs per day multiplied by number of years you smoked); (5) You get a written order from a healthcare provider  3  Glaucoma Screening: covered annually if you're considered high risk: (1) You have diabetes OR (2) Family history of glaucoma OR (3)  aged 48 and older OR (3)  American aged 72 and older  3  Osteoporosis Screening: covered every 2 years if you meet one of the following conditions: (1) You're estrogen deficient and at risk for osteoporosis based off medical history and other findings; (2) Have a vertebral abnormality; (3) On glucocorticoid therapy for more than 3 months; (4) Have primary hyperparathyroidism; (5) On osteoporosis medications and need to assess response to drug therapy  · Last bone density test (DXA Scan): 04/02/2019   5  HIV Screening: covered annually if you're between the age of 15-65   Also covered annually if you are younger than 13 and older than 72 with risk factors for HIV infection  For pregnant patients, it is covered up to 3 times per pregnancy  Immunizations:  Immunization Recommendations   Influenza Vaccine Annual influenza vaccination during flu season is recommended for all persons aged >= 6 months who do not have contraindications   Pneumococcal Vaccine (Prevnar and Pneumovax)  * Prevnar = PCV13  * Pneumovax = PPSV23   Adults 25-60 years old: 1-3 doses may be recommended based on certain risk factors  Adults 72 years old: Prevnar (PCV13) vaccine recommended followed by Pneumovax (PPSV23) vaccine  If already received PPSV23 since turning 65, then PCV13 recommended at least one year after PPSV23 dose  Hepatitis B Vaccine 3 dose series if at intermediate or high risk (ex: diabetes, end stage renal disease, liver disease)   Tetanus (Td) Vaccine - COST NOT COVERED BY MEDICARE PART B Following completion of primary series, a booster dose should be given every 10 years to maintain immunity against tetanus  Td may also be given as tetanus wound prophylaxis  Tdap Vaccine - COST NOT COVERED BY MEDICARE PART B Recommended at least once for all adults  For pregnant patients, recommended with each pregnancy  Shingles Vaccine (Shingrix) - COST NOT COVERED BY MEDICARE PART B  2 shot series recommended in those aged 48 and above     Health Maintenance Due:  There are no preventive care reminders to display for this patient  Immunizations Due:      Topic Date Due    Influenza Vaccine  07/01/2020     Advance Directives   What are advance directives? Advance directives are legal documents that state your wishes and plans for medical care  These plans are made ahead of time in case you lose your ability to make decisions for yourself  Advance directives can apply to any medical decision, such as the treatments you want, and if you want to donate organs  What are the types of advance directives?   There are many types of advance directives, and each state has rules about how to use them  You may choose a combination of any of the following:  · Living will: This is a written record of the treatment you want  You can also choose which treatments you do not want, which to limit, and which to stop at a certain time  This includes surgery, medicine, IV fluid, and tube feedings  · Durable power of  for healthcare [facility]): This is a written record that states who you want to make healthcare choices for you when you are unable to make them for yourself  This person, called a proxy, is usually a family member or a friend  You may choose more than 1 proxy  · Do not resuscitate (DNR) order:  A DNR order is used in case your heart stops beating or you stop breathing  It is a request not to have certain forms of treatment, such as CPR  A DNR order may be included in other types of advance directives  · Medical directive: This covers the care that you want if you are in a coma, near death, or unable to make decisions for yourself  You can list the treatments you want for each condition  Treatment may include pain medicine, surgery, blood transfusions, dialysis, IV or tube feedings, and a ventilator (breathing machine)  · Values history: This document has questions about your views, beliefs, and how you feel and think about life  This information can help others choose the care that you would choose  Why are advance directives important? An advance directive helps you control your care  Although spoken wishes may be used, it is better to have your wishes written down  Spoken wishes can be misunderstood, or not followed  Treatments may be given even if you do not want them  An advance directive may make it easier for your family to make difficult choices about your care     Weight Management   Why it is important to manage your weight:  Being overweight increases your risk of health conditions such as heart disease, high blood pressure, type 2 diabetes, and certain types of cancer  It can also increase your risk for osteoarthritis, sleep apnea, and other respiratory problems  Aim for a slow, steady weight loss  Even a small amount of weight loss can lower your risk of health problems  How to lose weight safely:  A safe and healthy way to lose weight is to eat fewer calories and get regular exercise  You can lose up about 1 pound a week by decreasing the number of calories you eat by 500 calories each day  Healthy meal plan for weight management:  A healthy meal plan includes a variety of foods, contains fewer calories, and helps you stay healthy  A healthy meal plan includes the following:  · Eat whole-grain foods more often  A healthy meal plan should contain fiber  Fiber is the part of grains, fruits, and vegetables that is not broken down by your body  Whole-grain foods are healthy and provide extra fiber in your diet  Some examples of whole-grain foods are whole-wheat breads and pastas, oatmeal, brown rice, and bulgur  · Eat a variety of vegetables every day  Include dark, leafy greens such as spinach, kale, elijah greens, and mustard greens  Eat yellow and orange vegetables such as carrots, sweet potatoes, and winter squash  · Eat a variety of fruits every day  Choose fresh or canned fruit (canned in its own juice or light syrup) instead of juice  Fruit juice has very little or no fiber  · Eat low-fat dairy foods  Drink fat-free (skim) milk or 1% milk  Eat fat-free yogurt and low-fat cottage cheese  Try low-fat cheeses such as mozzarella and other reduced-fat cheeses  · Choose meat and other protein foods that are low in fat  Choose beans or other legumes such as split peas or lentils  Choose fish, skinless poultry (chicken or turkey), or lean cuts of red meat (beef or pork)  Before you cook meat or poultry, cut off any visible fat  · Use less fat and oil  Try baking foods instead of frying them   Add less fat, such as margarine, sour cream, regular salad dressing and mayonnaise to foods  Eat fewer high-fat foods  Some examples of high-fat foods include french fries, doughnuts, ice cream, and cakes  · Eat fewer sweets  Limit foods and drinks that are high in sugar  This includes candy, cookies, regular soda, and sweetened drinks  Exercise:  Exercise at least 30 minutes per day on most days of the week  Some examples of exercise include walking, biking, dancing, and swimming  You can also fit in more physical activity by taking the stairs instead of the elevator or parking farther away from stores  Ask your healthcare provider about the best exercise plan for you  © Copyright [employer] 2018 Information is for End User's use only and may not be sold, redistributed or otherwise used for commercial purposes   All illustrations and images included in CareNotes® are the copyrighted property of A D A M , Inc  or [address] [place] [address] [facility]

## 2020-07-10 NOTE — ASSESSMENT & PLAN NOTE
Stable on levothyroxine 112 microgram   Continue same  Metabolic labs at 6 month interval follow-up thereafter

## 2020-07-10 NOTE — PROGRESS NOTES
Assessment and Plan:    Problem List Items Addressed This Visit     None        Health Maintenance Due   Topic Date Due    OT PLAN OF CARE  1940    Medicare Annual Wellness Visit (AWV)  03/08/2020    PT PLAN OF CARE  04/13/2020    BMI: Followup Plan  06/18/2020    Influenza Vaccine  07/01/2020         HPI:  Lovely Grajeda is a [de-identified] y o  female here for her Subsequent Wellness Visit      Patient Active Problem List   Diagnosis    Morbid obesity due to excess calories (HCC)    Benign essential hypertension    Chronic diastolic (congestive) heart failure (HCC)    Hypothyroidism    Acute on chronic diastolic congestive heart failure (HCC)    Arthropathy of knee    Hallux abductovalgus with bunions, unspecified laterality    Atherosclerosis of arteries of extremities (HCC)    Chronic low back pain    Chronic venous insufficiency    CKD (chronic kidney disease), stage III (HCC)    Difficulty in walking    Diverticulitis of colon    Elevated triglycerides with high cholesterol    Chronic gout without tophus    Hiatal hernia    Hyperlipemia    Hyperuricemia    Knee pain    Lumbar disc herniation with radiculopathy    Morbid obesity with body mass index of 40 0-44 9 in adult ([address] )    Nephrolithiasis    Onychomycosis    WENDI (obstructive sleep apnea)    Umbilical hernia    Tarsal tunnel syndrome    Rupture of popliteal cyst    Pseudogout of left wrist    Plantar fascial fibromatosis    Anxiety    Alkaline phosphatase elevation    Pain in both feet    Gastroesophageal reflux disease without esophagitis    Iron deficiency anemia secondary to inadequate dietary iron intake    Radiculopathy of lumbar region    Corns    SOB (shortness of breath)    Eczema    Osteoporosis screening    Peripheral arteriosclerosis ([address] )    Intraparenchymal hemorrhage of brain ([address] )    Transition of care performed with sharing of clinical summary    Prediabetes    Status post right frontotemporal replacement cranioplasty    Pain    Encounter for surgical aftercare following surgery of nervous system     Past Medical History:   Diagnosis Date    Anxiety     Arthritis     joints    Cataract     Disease of thyroid gland     Eczema     GERD (gastroesophageal reflux disease)     Gout     Heart failure ([address] )     Hepatitis     Hiatal hernia     Hypertension     Onychomycosis     last assessed: 16    Orthopnea     resolved: 16    Pseudogout of left wrist     Sleep apnea     wears CPAP    Stroke [facility])      Past Surgical History:   Procedure Laterality Date    ABDOMINAL SURGERY          BRAIN HEMATOMA EVACUATION Right 2020    Procedure: Right decompressive craniectomy and evacuation of intraparenchymal hematoma; Surgeon: Sis Stanley MD;  [facility];  Service: Neurosurgery    BREAST SURGERY Right     cyst removal    CARPAL TUNNEL RELEASE Left     CARPAL TUNNEL RELEASE Right     CATARACT EXTRACTION       SECTION  1960    x1    COLONOSCOPY N/A 2018    Procedure: COLONOSCOPY;  Surgeon: Filipe Klein MD;  [facility]; Service: Gastroenterology    DILATION AND CURETTAGE OF UTERUS  1967    EYE SURGERY Left     cataracts    HERNIA REPAIR      umbilical hernia    INCISIONAL HERNIA REPAIR      incarcerated    KNEE ARTHROSCOPY Right     LA REPLACE SKULL PLATE/FLAP Right     Procedure: right frontotemporal replacement cranioplasty;  Surgeon: Sis Stanley MD;  [facility];  Service: Neurosurgery    LA XCAPSL CTRC RMVL INSJ IO LENS PROSTH W/O ECP Right 3/27/2017    Procedure: EXTRACTION EXTRACAPSULAR CATARACT PHACO INTRAOCULAR LENS (IOL);   Surgeon: Giselle Whyte MD;  Location[facility] MAIN OR;  Service: Ophthalmology     Family History   Problem Relation Age of Onset    Diabetes Mother     Coronary artery disease Mother     Arthritis Mother     Hypertension Mother     Hypertension Father    Aidan Abt Diabetes Brother     Diabetes Son     Arthritis Family      Social History     Tobacco Use   Smoking Status Never Smoker   Smokeless Tobacco Never Used     Social History     Substance and Sexual Activity   Alcohol Use Not Currently    Alcohol/week: 0 0 standard drinks    Frequency: Never    Drinks per session: Patient refused    Binge frequency: Never    Comment: 0      Social History     Substance and Sexual Activity   Drug Use Never       Current Outpatient Medications   Medication Sig Dispense Refill    amLODIPine (NORVASC) 10 mg tablet Take 1 tablet (10 mg total) by mouth daily 90 tablet 1    atorvastatin (LIPITOR) 40 mg tablet Take 1 tablet (40 mg total) by mouth every evening 90 tablet 1    bumetanide (BUMEX) 2 mg tablet Take 1 tablet (2 mg total) by mouth daily 135 tablet 3    levothyroxine 112 mcg tablet Take 1 tablet (112 mcg total) by mouth daily 90 tablet 1    losartan (COZAAR) 25 mg tablet Take 2 tab in AM and 1 tab in  tablet 3    omeprazole (PriLOSEC) 20 mg delayed release capsule Take 1 capsule (20 mg total) by mouth every morning 90 capsule 1    spironolactone (ALDACTONE) 25 mg tablet Take 0 5 tablets (12 5 mg total) by mouth 2 (two) times a day (Patient taking differently: Take 12 5 mg by mouth daily ) 45 tablet 3     No current facility-administered medications for this visit        No Known Allergies  Immunization History   Administered Date(s) Administered    Influenza Split High Dose Preservative Free IM 10/09/2012, 09/24/2013, 09/09/2014, 10/02/2015, 09/23/2016, 10/24/2017    Influenza TIV (IM) 10/25/2001, 10/28/2005, 10/10/2006, 10/12/2007, 10/14/2008, 09/17/2010, 09/23/2011    Influenza, high dose seasonal 0 5 mL 10/05/2018    Pneumococcal Conjugate 13-Valent 11/15/2017    Pneumococcal Polysaccharide PPV23 10/10/2006    Zoster 11/30/2017    influenza, trivalent, adjuvanted 10/09/2019       Patient Care Team:  Hakeem Espinosa MD as PCP - General  Salina Espinal, MD Edwin Bai MD Deloria Brunette, MD Evalene Marts, MD as Endoscopist    Medicare Screening Tests and Risk Assessments:  Michelle Mcdaniel is here for her Subsequent Wellness visit  Last Medicare Wellness visit information reviewed, patient interviewed, no change since last AWV  Health Risk Assessment:   Patient rates overall health as poor  Patient feels that their physical health rating is Same  Eyesight was rated as Same  Hearing was rated as Same  Patient feels that their emotional and mental health rating is Same  Pain experienced in the last 7 days has been A lot  Patient states that she has experienced no weight loss or gain in last 6 months  Depression Screening:   PHQ-2 Score: 0      Fall Risk Screening: In the past year, patient has experienced: no history of falling in past year      Urinary Incontinence Screening:   Patient has not leaked urine accidently in the last six months  Home Safety:  Patient has trouble with stairs inside or outside of their home  Patient has working smoke alarms and has no working carbon monoxide detector  Home safety hazards include: none  Nutrition:   Current diet is No Added Salt, Low Saturated Fat and Limited junk food  Medications:   Patient is currently taking over-the-counter supplements  OTC medications include: see medication list  Patient is able to manage medications  Activities of Daily Living (ADLs)/Instrumental Activities of Daily Living (IADLs):   Walk and transfer into and out of bed and chair?: Yes  Dress and groom yourself?: Yes    Bathe or shower yourself?: Yes    Feed yourself? Yes  Do your laundry/housekeeping?: Yes  Manage your money, pay your bills and track your expenses?: Yes  Make your own meals?: Yes    Do your own shopping?: Yes    Previous Hospitalizations:   Any hospitalizations or ED visits within the last 12 months?: No      Advance Care Planning:   Living will: Yes    Durable POA for healthcare:  Yes Advanced directive: Yes    Advanced directive counseling given: Yes    End of Life Decisions reviewed with patient: Yes    Provider agrees with end of life decisions: Yes      Cognitive Screening:   Provider or family/friend/caregiver concerned regarding cognition?: No    PREVENTIVE SCREENINGS      Cardiovascular Screening:    General: Screening Not Indicated and History Lipid Disorder      Diabetes Screening:     General: Screening Not Indicated and History Diabetes      Colorectal Cancer Screening:     General: Risks and Benefits Discussed and Screening Not Indicated      Breast Cancer Screening:     General: Risks and Benefits Discussed and Screening Not Indicated      Cervical Cancer Screening:    General: Screening Not Indicated      Osteoporosis Screening:    General: Risks and Benefits Discussed    Due for: Bone Density Ultrasound      Abdominal Aortic Aneurysm (AAA) Screening:    Risk factors include: family history of AAA        General: Patient Declines      Lung Cancer Screening:     General: Screening Not Indicated      Hepatitis C Screening:    General: Patient Declines    Other Counseling Topics:   Car/seat belt/driving safety, skin self-exam, sunscreen and calcium and vitamin D intake and regular weightbearing exercise

## 2020-07-17 DIAGNOSIS — H10.32 ACUTE BACTERIAL CONJUNCTIVITIS OF LEFT EYE: ICD-10-CM

## 2020-07-17 RX ORDER — TOBRAMYCIN 3 MG/ML
1 SOLUTION/ DROPS OPHTHALMIC 2 TIMES DAILY
Qty: 1 BOTTLE | Refills: 0 | Status: SHIPPED | OUTPATIENT
Start: 2020-07-17 | End: 2020-07-17 | Stop reason: SDUPTHER

## 2020-07-17 RX ORDER — TOBRAMYCIN 3 MG/ML
1 SOLUTION/ DROPS OPHTHALMIC 2 TIMES DAILY
Qty: 1 BOTTLE | Refills: 0 | Status: SHIPPED | OUTPATIENT
Start: 2020-07-17 | End: 2020-09-21 | Stop reason: ALTCHOICE

## 2020-08-01 ENCOUNTER — OFFICE VISIT (OUTPATIENT)
Dept: URGENT CARE | Facility: CLINIC | Age: 80
End: 2020-08-01
Payer: MEDICARE

## 2020-08-01 VITALS
HEART RATE: 68 BPM | DIASTOLIC BLOOD PRESSURE: 63 MMHG | SYSTOLIC BLOOD PRESSURE: 141 MMHG | RESPIRATION RATE: 18 BRPM | TEMPERATURE: 97.9 F | BODY MASS INDEX: 35.87 KG/M2 | HEIGHT: 61 IN | OXYGEN SATURATION: 99 % | WEIGHT: 190 LBS

## 2020-08-01 DIAGNOSIS — R68.83 CHILLS (WITHOUT FEVER): Primary | ICD-10-CM

## 2020-08-01 PROCEDURE — 99213 OFFICE O/P EST LOW 20 MIN: CPT | Performed by: FAMILY MEDICINE

## 2020-08-01 PROCEDURE — G0463 HOSPITAL OUTPT CLINIC VISIT: HCPCS | Performed by: FAMILY MEDICINE

## 2020-08-01 NOTE — PROGRESS NOTES
[address]        NAME: Jerman Young is a [de-identified] y o  female  : 1940    MRN: [id number]  DATE: 2020  TIME: 1:24 PM    Assessment and Plan   Chills (without fever) [R68 83]  1  Chills (without fever)       Currently asymptomatic, but tearful due to depression  Suspected chills are not septic in nature, but due to decrease protein and caloric intake  Advised on improved diet and adequate hydration  May consider taking duloxetine for chronic pain and depression  Patient will speak with PCP regarding this  Patient Instructions     Follow up with PCP in 3-5 days  Proceed to  ER if symptoms worsen  Chief Complaint     Chief Complaint   Patient presents with    Dizziness     Patient had Stroke and craniotomy in 2020  Presents dizzy after sitting in Sun this morning  Also complains of chills even after sitting in the sun   Depression     Patient has had symptoms of depression ever since operation in February  History of Present Illness       [de-identified] female with pertinent history of stroke in 2020 presents today with new onset chills and dizziness which happened this morning  Is here with her son who is concern for possible infection  However the patient denies any UTI symptoms or dyspnea  Currently denies any chills and feels that the temperature is fine  The temperature at home is usually set to 74°  Son states that she eats poorly  Sometimes will eat 1 tomato for lunch  Is followed by cardiologist, nephrologist, neurosurgeon and her PCP who she reports work together to provide her care  Saw her PCP month ago and plans to see her again later in the year  Review of Systems   Review of Systems   Constitutional: Positive for chills  Negative for fever  Respiratory: Negative for cough and shortness of breath  Cardiovascular: Negative for chest pain  Musculoskeletal: Positive for arthralgias, back pain and gait problem     Skin: Negative for rash  Neurological: Positive for dizziness and light-headedness  Negative for headaches  Psychiatric/Behavioral: Positive for dysphoric mood  Negative for suicidal ideas           Current Medications       Current Outpatient Medications:     amLODIPine (NORVASC) 10 mg tablet, Take 1 tablet (10 mg total) by mouth daily, Disp: 90 tablet, Rfl: 1    atorvastatin (LIPITOR) 40 mg tablet, Take 1 tablet (40 mg total) by mouth every evening, Disp: 90 tablet, Rfl: 1    bumetanide (BUMEX) 2 mg tablet, Take 1 tablet (2 mg total) by mouth daily, Disp: 135 tablet, Rfl: 3    levothyroxine 112 mcg tablet, Take 1 tablet (112 mcg total) by mouth daily, Disp: 90 tablet, Rfl: 1    losartan (COZAAR) 25 mg tablet, Take 2 tab in AM and 1 tab in PM, Disp: 270 tablet, Rfl: 3    omeprazole (PriLOSEC) 20 mg delayed release capsule, Take 1 capsule (20 mg total) by mouth every morning, Disp: 90 capsule, Rfl: 1    spironolactone (ALDACTONE) 25 mg tablet, Take 0 5 tablets (12 5 mg total) by mouth 2 (two) times a day (Patient taking differently: Take 12 5 mg by mouth daily ), Disp: 45 tablet, Rfl: 3    tobramycin (TOBREX) 0 3 % SOLN, Administer 1 drop to the right eye 2 (two) times a day, Disp: 1 Bottle, Rfl: 0    Current Allergies     Allergies as of 08/01/2020    (No Known Allergies)            The following portions of the patient's history were reviewed and updated as appropriate: allergies, current medications, past family history, past medical history, past social history, past surgical history and problem list      Past Medical History:   Diagnosis Date    Anxiety     Arthritis     joints    Cataract     Disease of thyroid gland     Eczema     GERD (gastroesophageal reflux disease)     Gout     Heart failure ([facility] [address] )     Hepatitis     Hiatal hernia     Hypertension     Onychomycosis     last assessed: 4/29/16    Orthopnea     resolved: 1/8/16    Pseudogout of left wrist     Sleep apnea     wears CPAP    Stroke [facility])        Past Surgical History:   Procedure Laterality Date    ABDOMINAL SURGERY          BRAIN HEMATOMA EVACUATION Right 2020    Procedure: Right decompressive craniectomy and evacuation of intraparenchymal hematoma; Surgeon: Eun Alejandre MD;  [facility]: [facility];  Service: Neurosurgery    BREAST SURGERY Right     cyst removal    CARPAL TUNNEL RELEASE Left     CARPAL TUNNEL RELEASE Right     CATARACT EXTRACTION       SECTION  1960    x1    COLONOSCOPY N/A 2018    Procedure: COLONOSCOPY;  Surgeon: Telma Almonte MD;  Location: [facility]; Service: Gastroenterology    DILATION AND CURETTAGE OF UTERUS  1967    EYE SURGERY Left 2006    cataracts    HERNIA REPAIR      umbilical hernia    INCISIONAL HERNIA REPAIR      incarcerated    KNEE ARTHROSCOPY Right     ME REPLACE SKULL PLATE/FLAP Right 4136    Procedure: right frontotemporal replacement cranioplasty;  Surgeon: Eun Alejandre MD;  [facility];  Service: Neurosurgery    ME XCAPSL CTRC RMVL INSJ IO LENS PROSTH W/O ECP Right 3/27/2017    Procedure: EXTRACTION EXTRACAPSULAR CATARACT PHACO INTRAOCULAR LENS (IOL); Surgeon: Breann Best MD;  [facility];  Service: Ophthalmology       Family History   Problem Relation Age of Onset    Diabetes Mother     Coronary artery disease Mother     Arthritis Mother     Hypertension Mother     Hypertension Father     Diabetes Brother     Diabetes Son     Arthritis Family          Medications have been verified  Objective   /63   Pulse 68   Temp 97 9 °F (36 6 °C)   Resp 18   Ht 5' 1" (1 549 m)   Wt 86 2 kg (190 lb)   SpO2 99%   BMI 35 90 kg/m²        Physical Exam     Physical Exam   Constitutional: She is oriented to person, place, and time  She appears well-developed and well-nourished  She appears distressed (Somewhat tearful at times)  HENT:   Head: Normocephalic and atraumatic     Eyes: Conjunctivae are normal  Right eye exhibits no discharge  Left eye exhibits no discharge  Cardiovascular: Normal rate and regular rhythm  Pulmonary/Chest: Effort normal and breath sounds normal  No stridor  No respiratory distress  She has no wheezes  She has no rales  Neurological: She is alert and oriented to person, place, and time  Skin: Skin is warm  She is not diaphoretic  No erythema  Psychiatric: She has a normal mood and affect  Her behavior is normal  Judgment and thought content normal    Nursing note and vitals reviewed

## 2020-08-05 ENCOUNTER — TELEMEDICINE (OUTPATIENT)
Dept: CARDIOLOGY CLINIC | Facility: CLINIC | Age: 80
End: 2020-08-05
Payer: MEDICARE

## 2020-08-05 ENCOUNTER — TELEMEDICINE (OUTPATIENT)
Dept: NEPHROLOGY | Facility: CLINIC | Age: 80
End: 2020-08-05
Payer: MEDICARE

## 2020-08-05 VITALS
DIASTOLIC BLOOD PRESSURE: 58 MMHG | BODY MASS INDEX: 35.87 KG/M2 | SYSTOLIC BLOOD PRESSURE: 131 MMHG | WEIGHT: 190 LBS | HEART RATE: 69 BPM | HEIGHT: 61 IN

## 2020-08-05 DIAGNOSIS — I34.0 MODERATE TO SEVERE MITRAL REGURGITATION: ICD-10-CM

## 2020-08-05 DIAGNOSIS — N18.30 CKD (CHRONIC KIDNEY DISEASE), STAGE III (HCC): Primary | ICD-10-CM

## 2020-08-05 DIAGNOSIS — R60.0 BILATERAL LEG EDEMA: ICD-10-CM

## 2020-08-05 DIAGNOSIS — I10 BENIGN ESSENTIAL HTN: ICD-10-CM

## 2020-08-05 DIAGNOSIS — I10 BENIGN ESSENTIAL HYPERTENSION: Primary | ICD-10-CM

## 2020-08-05 DIAGNOSIS — I50.33 ACUTE ON CHRONIC DIASTOLIC CHF (CONGESTIVE HEART FAILURE) (HCC): ICD-10-CM

## 2020-08-05 PROCEDURE — 3075F SYST BP GE 130 - 139MM HG: CPT | Performed by: INTERNAL MEDICINE

## 2020-08-05 PROCEDURE — 4040F PNEUMOC VAC/ADMIN/RCVD: CPT | Performed by: INTERNAL MEDICINE

## 2020-08-05 PROCEDURE — 1160F RVW MEDS BY RX/DR IN RCRD: CPT | Performed by: INTERNAL MEDICINE

## 2020-08-05 PROCEDURE — 3044F HG A1C LEVEL LT 7.0%: CPT | Performed by: INTERNAL MEDICINE

## 2020-08-05 PROCEDURE — 4010F ACE/ARB THERAPY RXD/TAKEN: CPT | Performed by: INTERNAL MEDICINE

## 2020-08-05 PROCEDURE — 99214 OFFICE O/P EST MOD 30 MIN: CPT | Performed by: INTERNAL MEDICINE

## 2020-08-05 PROCEDURE — 3008F BODY MASS INDEX DOCD: CPT | Performed by: INTERNAL MEDICINE

## 2020-08-05 PROCEDURE — 3078F DIAST BP <80 MM HG: CPT | Performed by: INTERNAL MEDICINE

## 2020-08-05 PROCEDURE — 1036F TOBACCO NON-USER: CPT | Performed by: INTERNAL MEDICINE

## 2020-08-05 PROCEDURE — 3066F NEPHROPATHY DOC TX: CPT | Performed by: INTERNAL MEDICINE

## 2020-08-05 PROCEDURE — 99442 PR PHYS/QHP TELEPHONE EVALUATION 11-20 MIN: CPT | Performed by: INTERNAL MEDICINE

## 2020-08-05 PROCEDURE — 1170F FXNL STATUS ASSESSED: CPT | Performed by: INTERNAL MEDICINE

## 2020-08-05 RX ORDER — LOSARTAN POTASSIUM 25 MG/1
TABLET ORAL
Qty: 180 TABLET | Refills: 3 | Status: SHIPPED | OUTPATIENT
Start: 2020-08-05 | End: 2021-02-15 | Stop reason: SDUPTHER

## 2020-08-05 RX ORDER — SPIRONOLACTONE 25 MG/1
12.5 TABLET ORAL DAILY
Qty: 45 TABLET | Refills: 3 | Status: SHIPPED | OUTPATIENT
Start: 2020-08-05 | End: 2021-08-30 | Stop reason: SDUPTHER

## 2020-08-05 NOTE — PATIENT INSTRUCTIONS
1) Avoid NSAIDS - (Example - motrin, advil, ibuprofen, aleve, exederin, etc)  2) Always follow a low salt diet  3) labwork in one month  4) no changes to your medications today  5) appointment in 3 months - we will call you to schedule  6) please talk to your primary care physician Dr Aly Harvey about depressed mood

## 2020-08-05 NOTE — PROGRESS NOTES
Cardiology Follow Up  [facility]  1940  [id number]  [facility] [address]  [facility] [address] [id number]    No diagnosis found  Discussion/Plan: Moderate to severe mitral regurgitation- optimize blood pressure  Denies worsening dyspnea  Weight is stable  Continue amlodipine 10 mg  Losartan 50 mg twice a day  Bumex 2 mg daily    Acute on chronic diastolic heart failure with a component of lymphedem- with stable at 190 ilbs improved volume status  Lower ext better  - Weight loss  - Compression socks + wedge pillow  - Increase bumex 2mg daily  - Low-salt diet   - Elevation of legs + walking  -- if with continued lower extremity edema consideration for compression boots  -- dry weight around 190 ilbs  If increasing to 195 pounds she will let me know    Chronic dyspnea-I believe this is multifactorial including chronic deconditioning from severe back pain  BMI of 41  Some retained lower extremity edema  Her PFTs were reviewed which also show some restriction  She needs to started structured exercise program   Possible swimming or water sports given her severe back discomfort     Sleep apnea  -- cpap    Incomplete rbbb    Acute on chronic kidney disease follow-up with renal  - continue cozaar +  - Bumex 2mg daily    Triglycerides- controlled  - 4000 mg omega-3 daily    Major depression/chronic pain- will consider cymbalta      WENDI- on cpap      Interval History:  She denies having chest pain  Her edema is better  Taking omega 3 currently  No bleeding or bruising  No dizziness or light-headness  Blood pressure very well controlled  Has back pain  12/11/2018:  She reports having some exertional shortness of breath  Her lower extremity edema has improved but is persistent  She denies feeling dizziness or lightheadedness  She denies having any falls    She denies having any bleeding or bruising     06/12/2019:  She reports some improvement in her shortness of breath but still has dyspnea while sitting  She is unable to walk secondary to severe back pain  She denies feeling dizziness or lightheadedness  Her lower extremity edema has improved  She is trying a elevator legs  She does wear compression socks  She is compliant with CPAP     31/20: [de-identified]year-old with recent intraparenchymal hemorrhage status post right decompressive craniectomy and evacuation of intraparenchymal hematoma  She is pending follow-up bone flap by neuro surgery  She tolerated her previous procedure without evidence of decompensated heart failure  She was noted to have some bradycardia and her beta-blocker was discontinued  She is compliant with her antihypertensive medications  She is compliant with her diuretics  She denies having significant worsening in her chronic dyspnea or lower extremity edema    8/5/20:  Her weight has been stable  No worsening in lower extremity edema  She is watching her salt  Her major concern is depression      Patient Active Problem List   Diagnosis    Morbid obesity due to excess calories (HCC)    Benign essential HTN    Chronic diastolic (congestive) heart failure (HCC)    Hypothyroidism    Acute on chronic diastolic congestive heart failure (HCC)    Arthropathy of knee    Hallux abductovalgus with bunions, unspecified laterality    Atherosclerosis of arteries of extremities (HCC)    Chronic low back pain    Chronic venous insufficiency    CKD (chronic kidney disease), stage III (HCC)    Difficulty in walking    Diverticulitis of colon    Elevated triglycerides with high cholesterol    Chronic gout without tophus    Hiatal hernia    Hyperlipemia    Hyperuricemia    Knee pain    Lumbar disc herniation with radiculopathy    Morbid obesity with body mass index of 40 0-44 9 in adult ([address]    Nephrolithiasis    Onychomycosis    WENDI (obstructive sleep apnea)    Umbilical hernia    Tarsal tunnel syndrome    Rupture of popliteal cyst    Pseudogout of left wrist    Plantar fascial fibromatosis    Anxiety    Alkaline phosphatase elevation    Pain in both feet    Gastroesophageal reflux disease without esophagitis    Iron deficiency anemia secondary to inadequate dietary iron intake    Radiculopathy of lumbar region    Corns    SOB (shortness of breath)    Eczema    Medicare annual wellness visit, subsequent    Peripheral arteriosclerosis ([facility]    Intraparenchymal hemorrhage of brain ([facility] )    Transition of care performed with sharing of clinical summary    Prediabetes    Status post right frontotemporal replacement cranioplasty    Pain    Encounter for surgical aftercare following surgery of nervous system    BMI 38 0-38 9,adult    Acute bacterial conjunctivitis of left eye    Need for vaccination     Past Medical History:   Diagnosis Date    Anxiety     Arthritis     joints    Cataract     Disease of thyroid gland     Eczema     GERD (gastroesophageal reflux disease)     Gout     Heart failure ([facility]     Hepatitis     Hiatal hernia     Hypertension     Onychomycosis     last assessed: 4/29/16    Orthopnea     resolved: 1/8/16    Pseudogout of left wrist     Sleep apnea     wears CPAP    Stroke [facility])      Social History     Socioeconomic History    Marital status: /Civil Union     Spouse name: Not on file    Number of children: Not on file    Years of education: Not on file    Highest education level: Not on file   Occupational History    Not on file   Social Needs    Financial resource strain: Not on file    Food insecurity     Worry: Not on file     Inability: Not on file    Transportation needs     Medical: No     Non-medical: No   Tobacco Use    Smoking status: Never Smoker    Smokeless tobacco: Never Used   Substance and Sexual Activity    Alcohol use:  Yes     Alcohol/week: 0 0 standard drinks     Frequency: Monthly or less     Drinks per session: 1 or 2     Binge frequency: Never     Comment: Rare   Drug use: Never    Sexual activity: Not Currently     Birth control/protection: Post-menopausal   Lifestyle    Physical activity     Days per week: Not on file     Minutes per session: Not on file    Stress: Not on file   Relationships    Social connections     Talks on phone: Not on file     Gets together: Not on file     Attends Restoration service: Not on file     Active member of club or organization: Not on file     Attends meetings of clubs or organizations: Not on file     Relationship status: Not on file    Intimate partner violence     Fear of current or ex partner: Not on file     Emotionally abused: Not on file     Physically abused: Not on file     Forced sexual activity: Not on file   Other Topics Concern    Not on file   Social History Narrative    Daily coffee consumption    Lack of exercise    Sleeps 6-7 hours a day      Family History   Problem Relation Age of Onset    Diabetes Mother     Coronary artery disease Mother     Arthritis Mother     Hypertension Mother     Hypertension Father     Diabetes Brother     Diabetes Son     Arthritis Family      Past Surgical History:   Procedure Laterality Date    ABDOMINAL SURGERY          BRAIN HEMATOMA EVACUATION Right 2020    Procedure: Right decompressive craniectomy and evacuation of intraparenchymal hematoma; Surgeon: Derrick Weiss MD;  Location:  MAIN OR;  Service: Neurosurgery    BREAST SURGERY Right     cyst removal    CARPAL TUNNEL RELEASE Left     CARPAL TUNNEL RELEASE Right 2004    CATARACT EXTRACTION       SECTION  1960    x1    COLONOSCOPY N/A 2018    Procedure: COLONOSCOPY;  Surgeon: Cordell Salvador MD;  Location: [facility] GI LAB;   Service: Gastroenterology    DILATION AND CURETTAGE OF UTERUS  1967    EYE SURGERY Left 2006    cataracts    HERNIA REPAIR      umbilical hernia    INCISIONAL HERNIA REPAIR incarcerated    KNEE ARTHROSCOPY Right     WY REPLACE SKULL PLATE/FLAP Right 3/6/0294    Procedure: right frontotemporal replacement cranioplasty;  Surgeon: Caitlin Osuna MD;  Location:  MAIN OR;  Service: Neurosurgery    WY XCAPSL CTRC RMVL INSJ IO LENS PROSTH W/O ECP Right 3/27/2017    Procedure: EXTRACTION EXTRACAPSULAR CATARACT PHACO INTRAOCULAR LENS (IOL); Surgeon: Maria Elena Santamaria MD;  Location: [facility] MAIN OR;  Service: Ophthalmology       Current Outpatient Medications:     amLODIPine (NORVASC) 10 mg tablet, Take 1 tablet (10 mg total) by mouth daily, Disp: 90 tablet, Rfl: 1    atorvastatin (LIPITOR) 40 mg tablet, Take 1 tablet (40 mg total) by mouth every evening, Disp: 90 tablet, Rfl: 1    bumetanide (BUMEX) 2 mg tablet, Take 1 tablet (2 mg total) by mouth daily, Disp: 135 tablet, Rfl: 3    levothyroxine 112 mcg tablet, Take 1 tablet (112 mcg total) by mouth daily, Disp: 90 tablet, Rfl: 1    losartan (COZAAR) 25 mg tablet, Take 2 tab in AM and 1 tab in PM, Disp: 270 tablet, Rfl: 3    omeprazole (PriLOSEC) 20 mg delayed release capsule, Take 1 capsule (20 mg total) by mouth every morning, Disp: 90 capsule, Rfl: 1    spironolactone (ALDACTONE) 25 mg tablet, Take 0 5 tablets (12 5 mg total) by mouth 2 (two) times a day (Patient taking differently: Take 12 5 mg by mouth daily ), Disp: 45 tablet, Rfl: 3    tobramycin (TOBREX) 0 3 % SOLN, Administer 1 drop to the right eye 2 (two) times a day, Disp: 1 Bottle, Rfl: 0  No Known Allergies    Review of Systems:  Review of Systems   Constitutional: Negative  Negative for activity change, appetite change, chills, diaphoresis, fatigue, fever and unexpected weight change  HENT: Negative  Negative for congestion, dental problem, drooling, ear discharge, ear pain, facial swelling, hearing loss, mouth sores, nosebleeds, postnasal drip, rhinorrhea, sinus pressure, sinus pain, sneezing, sore throat, tinnitus, trouble swallowing and voice change  Eyes: Negative  Negative for photophobia, pain, redness, itching and visual disturbance  Respiratory: Negative  Negative for apnea, cough, choking, chest tightness, shortness of breath, wheezing and stridor  Cardiovascular: Positive for leg swelling  Negative for chest pain and palpitations  Gastrointestinal: Negative  Negative for abdominal distention, abdominal pain, anal bleeding, blood in stool, constipation, diarrhea, nausea, rectal pain and vomiting  Endocrine: Negative  Negative for cold intolerance, heat intolerance, polydipsia, polyphagia and polyuria  Genitourinary: Negative  Negative for decreased urine volume, difficulty urinating, dyspareunia, dysuria, enuresis, flank pain, frequency, genital sores, hematuria, menstrual problem, pelvic pain, urgency, vaginal bleeding, vaginal discharge and vaginal pain  Musculoskeletal: Positive for back pain  Negative for arthralgias, gait problem, joint swelling, myalgias, neck pain and neck stiffness  Skin: Negative  Negative for color change, pallor, rash and wound  Allergic/Immunologic: Negative  Negative for environmental allergies, food allergies and immunocompromised state  Neurological: Negative  Negative for dizziness, tremors, seizures, syncope, facial asymmetry, speech difficulty, weakness, light-headedness, numbness and headaches  Hematological: Negative  Negative for adenopathy  Does not bruise/bleed easily  Psychiatric/Behavioral: Positive for decreased concentration and dysphoric mood  Negative for agitation, behavioral problems, confusion, hallucinations, self-injury, sleep disturbance and suicidal ideas  The patient is nervous/anxious  The patient is not hyperactive  All other systems reviewed and are negative        Vitals:    08/05/20 1237   BP: 131/58   Pulse: 69   Weight: 86 2 kg (190 lb)   Height: 5' 1" (1 549 m)         Labs:     Lab Results   Component Value Date    WBC 5 12 06/23/2020    HGB 11 5 06/23/2020 HCT 37 0 2020    MCV 87 2020     2020     Lab Results   Component Value Date    K 4 1 2020     2020    CO2 29 2020    BUN 30 (H) 2020    CREATININE 1 15 2020    GLUCOSE 124 2020    GLUF 96 2020    CALCIUM 9 6 2020    AST 12 2020    ALT 16 2020    ALKPHOS 116 2020    EGFR 45 2020     Lab Results   Component Value Date    CHOL 191 2013    CHOL 218 2013     Lab Results   Component Value Date    HDL 50 2020    HDL 45 2020    HDL 46 2019     Lab Results   Component Value Date    LDLCALC 48 2020    LDLCALC 103 (H) 2020    LDLCALC 124 (H) 2019     Lab Results   Component Value Date    TRIG 114 2020    TRIG 146 2020    TRIG 122 2019     Lab Results   Component Value Date    HGBA1C 6 0 (H) 2020       Imaging & Testing   I have personally reviewed pertinent reports  EKG: Personally reviewed  Normal sinus rhythm no acute st/t wave    Cardiac testing:   Results for orders placed during the hospital encounter of 01/15/16   Echo complete with contrast if indicated    Narrative [address]  [address] [facility] [address]  [phone]    Transthoracic Echocardiogram  2D, M-mode, Doppler, and Color Doppler    Study date:  15-Berny-2016    Patient: Danuta Mccauley  MR number: [id number]  Account number: [de-identified]  : 1940  Age: 67 years  Gender: Female  Status: Routine  Location: Echo lab  Height: 61 in  Weight: 214 5 lb  BP: 132/ 84 mmHg    Indications: HTN    Diagnoses: 401 9 - HYPERTENSION NOS    Sonographer:  Edgardo Mercer  Primary Physician:  Chalo Leonardo  Referring Physician:  Joyce Sage:  Radha Al  Interpreting Physician:  Trace Orellana DO    SUMMARY    LEFT VENTRICLE:  Systolic function was at the lower limits of normal  Ejection fraction was  estimated in the range of 50 % to 55 %   There were no regional wall motion abnormalities  There was moderate concentric hypertrophy  Features were consistent with a pseudonormal left ventricular filling pattern,  with concomitant abnormal relaxation and increased filling pressure (grade 2  diastolic dysfunction)  Doppler parameters were consistent with elevated mean  left atrial filling pressure  LEFT ATRIUM:  The atrium was moderately dilated  RIGHT ATRIUM:  The atrium was markedly dilated  MITRAL VALVE:  There was marked annular calcification  There was moderate regurgitation  TRICUSPID VALVE:  There was mild regurgitation  Pulmonary artery systolic pressure was mildly increased  Estimated peak PA pressure was 46 mmHg  HISTORY: PRIOR HISTORY: Patient has no history of cardiovascular disease  PROCEDURE: The procedure was performed in the echo lab  This was a routine  study  The transthoracic approach was used  The study included complete 2D  imaging, M-mode, complete spectral Doppler, and color Doppler  The heart rate  was 77 bpm, at the start of the study  Echocardiographic views were limited due  to poor acoustic window availability and decreased penetration  This was a  technically difficult study  LEFT VENTRICLE: Size was normal  Systolic function was at the lower limits of  normal  Ejection fraction was estimated in the range of 50 % to 55 %  There  were no regional wall motion abnormalities  There was moderate concentric  hypertrophy  DOPPLER: Features were consistent with a pseudonormal left  ventricular filling pattern, with concomitant abnormal relaxation and increased  filling pressure (grade 2 diastolic dysfunction)  Doppler parameters were  consistent with elevated mean left atrial filling pressure  RIGHT VENTRICLE: The size was normal  Systolic function was normal  DOPPLER:  Systolic pressure was within the normal range  LEFT ATRIUM: The atrium was moderately dilated  No thrombus was identified      RIGHT ATRIUM: The atrium was markedly dilated  MITRAL VALVE: There was marked annular calcification  Valve structure was  normal  There was normal leaflet separation  No echocardiographic evidence for  prolapse  DOPPLER: The transmitral velocity was within the normal range  There  was no evidence for stenosis  There was moderate regurgitation  AORTIC VALVE: The valve was trileaflet  Leaflets exhibited normal thickness,  normal cuspal separation, and sclerosis  DOPPLER: Transaortic velocity was  within the normal range  There was no evidence for stenosis  There was no  regurgitation  TRICUSPID VALVE: The valve structure was normal  There was normal leaflet  separation  DOPPLER: The transtricuspid velocity was within the normal range  There was mild regurgitation  Pulmonary artery systolic pressure was mildly  increased  Estimated peak PA pressure was 46 mmHg  PULMONIC VALVE: Leaflets exhibited normal thickness, no calcification, and  normal cuspal separation  DOPPLER: The transpulmonic velocity was within the  normal range  There was mild regurgitation  PERICARDIUM: There was no thickening  There was no pericardial effusion  AORTA: The root exhibited normal size  PULMONARY ARTERY: The size was normal  The morphology appeared normal     SYSTEM MEASUREMENT TABLES    2D mode  AoR Diam 2D: 2 8 cm  LA Diam (2D): 5 3 cm  LA/Ao (2D): 1 89  FS (2D Teich): 25 3 %  IVSd (2D): 1 44 cm  LVDEV: 98 3 cm³  LVESV: 49 1 cm³  LVIDd(2D): 4 62 cm  LVISd (2D): 3 45 cm  LVPWd (2D): 1 3 cm  SV (Teich): 49 2 cm³    Apical four chamber  LVEF A4C: 55 %    Unspecified Scan Mode  MV Peak A Jesse: 1110 mm/s  MV Peak E Jesse   Mean: 1400 mm/s  MVA (PHT): 3 55 cm squared  PHT: 58 ms  Max P mm[Hg]  V Max: 2990 mm/s  Vmax: 3050 mm/s  RA Area: 19 6 cm squared  RA Volume: 55 3 cm³  TAPSE: 1 9 cm    [facility] Accredited Echocardiography Laboratory    Prepared and electronically signed by    DO Leonard Arboelda 16-Jan-2016 17:37:47           Carlos Alaniz  Please call with any questions or suggestions    A description of the counseling:   Goals and Barriers:  Patient's ability to self care:  Medication side effect reviewed with patient in detail and all their questions answered  "This note has been constructed using a voice recognition system  Therefore there may be syntax, spelling, and/or grammatical errors  Please call if you have any questions  "    It was my intent to perform this visit via video technology but the patient was not able to do a video connection so the visit was completed via audio telephone only

## 2020-08-05 NOTE — LETTER
August 5, 2020     Jerome Webb MD  [address] [facility]  [facility] [address]    Patient: Rossaan Nuñez   YOB: 1940   Date of Visit: 8/5/2020       Dear Dr Chandni Adams:    Thank you for referring Jakub Lutz to me for evaluation  Below are my notes for this consultation  If you have questions, please do not hesitate to call me  I look forward to following your patient along with you  Sincerely,        Carolin Bustos MD        CC: No Recipients  Carolin Bustos MD  8/5/2020  3:29 PM  Sign when Signing Visit    Virtual Regular Visit      Assessment/Plan:    Problem List Items Addressed This Visit     None               Reason for visit is   Chief Complaint   Patient presents with    Virtual Regular Visit        Encounter provider Carolin Bustos MD    Provider located at [address] [place] [id number]      Recent Visits  No visits were found meeting these conditions  Showing recent visits within past 7 days and meeting all other requirements     Future Appointments  No visits were found meeting these conditions  Showing future appointments within next 150 days and meeting all other requirements        The patient was identified by name and date of birth  Rossana Nuñez was informed that this is a telemedicine visit and that the visit is being conducted through phone and patient was informed that this is not a secure, HIPAA-complaint platform  She agrees to proceed     My office door was closed  No one else was in the room  She acknowledged consent and understanding of privacy and security of the video platform  The patient has agreed to participate and understands they can discontinue the visit at any time  Patient is aware this is a billable service       It was my intent to perform this visit via video technology but the patient was not able to do a video connection so the visit was completed via audio telephone only  Pt preference    Subjective  Micah Leon is a [de-identified] y o  female who is feeling better  Saturday was not feeling well  Feeling dizzy, cold, and felt depressed mood and cried so went for help at urgent care  HPI     Past Medical History:   Diagnosis Date    Anxiety     Arthritis     joints    Cataract     Disease of thyroid gland     Eczema     GERD (gastroesophageal reflux disease)     Gout     Heart failure (HCC)     Hepatitis     Hiatal hernia     Hypertension     Onychomycosis     last assessed: 16    Orthopnea     resolved: 16    Pseudogout of left wrist     Sleep apnea     wears CPAP    Stroke [facility])        Past Surgical History:   Procedure Laterality Date    ABDOMINAL SURGERY          BRAIN HEMATOMA EVACUATION Right 2020    Procedure: Right decompressive craniectomy and evacuation of intraparenchymal hematoma; Surgeon: Jeferson Chávez MD;  [facility];  Service: Neurosurgery    BREAST SURGERY Right     cyst removal    CARPAL TUNNEL RELEASE Left     CARPAL TUNNEL RELEASE Right     CATARACT EXTRACTION       SECTION  1960    x1    COLONOSCOPY N/A 2018    Procedure: COLONOSCOPY;  Surgeon: Jac Guy MD;  Location: [facility] GI LAB; Service: Gastroenterology    DILATION AND CURETTAGE OF UTERUS  1967    EYE SURGERY Left 2006    cataracts    HERNIA REPAIR      umbilical hernia    INCISIONAL HERNIA REPAIR      incarcerated    KNEE ARTHROSCOPY Right     MD REPLACE SKULL PLATE/FLAP Right 5560    Procedure: right frontotemporal replacement cranioplasty;  Surgeon: Jeferson Chávez MD;  Location:  MAIN OR;  Service: Neurosurgery    MD XCAPSL CTRC RMVL INSJ IO LENS PROSTH W/O ECP Right 3/27/2017    Procedure: EXTRACTION EXTRACAPSULAR CATARACT PHACO INTRAOCULAR LENS (IOL);   Surgeon: Clara Espinoza MD;  Location: [facility] MAIN OR;  Service: Ophthalmology       Current Outpatient Medications   Medication Sig Dispense Refill    amLODIPine (NORVASC) 10 mg tablet Take 1 tablet (10 mg total) by mouth daily 90 tablet 1    atorvastatin (LIPITOR) 40 mg tablet Take 1 tablet (40 mg total) by mouth every evening 90 tablet 1    bumetanide (BUMEX) 2 mg tablet Take 1 tablet (2 mg total) by mouth daily 135 tablet 3    levothyroxine 112 mcg tablet Take 1 tablet (112 mcg total) by mouth daily 90 tablet 1    losartan (COZAAR) 25 mg tablet Take 2 tab in AM and 1 tab in  tablet 3    omeprazole (PriLOSEC) 20 mg delayed release capsule Take 1 capsule (20 mg total) by mouth every morning 90 capsule 1    spironolactone (ALDACTONE) 25 mg tablet Take 0 5 tablets (12 5 mg total) by mouth 2 (two) times a day (Patient taking differently: Take 12 5 mg by mouth daily ) 45 tablet 3    tobramycin (TOBREX) 0 3 % SOLN Administer 1 drop to the right eye 2 (two) times a day 1 Bottle 0     No current facility-administered medications for this visit  No Known Allergies    Review of Systems    Video Exam    There were no vitals filed for this visit  Physical Exam        2/2020 - February with 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 on February 11th  Completed course of Keppra for seizure prophylaxis  3/30/2020 - losartan was increased to 50 mg twice a day from 50 in the morning and 25 in the evening due to high blood pressures    4/3/2020 - spironolactone 12 5 mg daily was started due to continued high blood pressure    4/2020 - patient had presented for replacement current of cranioplasty on April 6  And subsequently was admitted to the rehab center postoperatively      69-year-old female with a past medical history of chronic kidney disease, venous stasis, HTN, hypothyroidism, lumbar canal stenosis, Anxiety, GERD, neuropathy, Gout, EF 75-65%, Grade 2 diastolic dysfunction who presents for follow up for CKD   To note, patient's  has now passed away in August 1) CKD - Prior year had nl Cr prior to aug 2016 and then had SHABANA during diarrhea episode  To note, patient also has a long standing history of HTN  Creatinine in 2013, 0 9 mg/dL  Cr early 2016 was 0 80-0 9 mg/dL; then increased starting in august 2016 to 2 3 mg/dL and has fluctuated since  CT scan in 2016, kidneys appearing normal at that time       Etiology of CKD may be long standing HTN and ATN  Baseline Cr ~ 1 1 -1 3 mg/dL     Most recent creatinine is 1 15 mg/dL in June 2020       - Urine eos 0%;   - A1c controlled prior  - UA 10-20 WBC   Monitoring  -U PCR stable  0 15 stable --> 0 18 gm/gm in 6/2020  -SPEP unrevealing  -UPEP unrevealing  - C3, C4 unrevealing  - Renal u/s with R 10 3 cm, L 10 4 cm, renal cortex 1 cm b/l; both kidneys slightly reduced in size; lower pole of R kidney is non obstructing calculus  - Pt follows with Urology team for nephrolithiasis  - No NSAIDs, the patient is not currently taking NSAID  -nuc stress test per Cardiology in Jan 2019 unrevealing  - repeat renal u/s 1/2019 - unrevealing with exception of renal cortical atrophy; but also 6 mm non shadowing calculus      Plan:  -  continue current antihypertensive regimen  Patient is on losartan 50 mg in the morning and 25 mg in the evening, spironolactone 12 5 mg daily, amlodipine 10 mg daily, Bumex 2 mg daily  -patient was on higher doses of antihypertensives over the past couple months but states that they have been reduced since the recent hospitalization  Blood pressures have remained 130s this morning  -depressed mood-advised the patient to see psychiatrist   Patient defers for now  I have message the primary care physician to inform there team also of this    I also did offer the patient to start a low-dose antidepressant today until she has a chance to review with her family doctor or psychiatrist but the patient deferred this also for now  -BMP and labwork in 1 month     2) SOB - Volume - follows with Cardiology     -on Bumetanide     3) HTN -      - cont losartan, bumex and amlodipine, spironolactone  -  due to bradycardia, beta-blocker was held prior     4) hypothyroid - as per Primary Care Physician     5) HPL - Primary Care following       6) Electrolytes - stable     7) MBD -     - Vit D 50 7 in nov 2019  - PTH improved 89 in nov 2019 --> 78 6 in June 2020     8) gout -      -monitor for flare     9) back pain - follows with Pain management     10) Colonoscopy in feb with internal hemorrhoids and polyp removed       11) alk phos elevation     -improved     12) Anemia - Hb stable     -hemoglobin stable     13) Mitral valve calcification     - per Cardiology team     14) elevated D-dimer prior-patient was given duplex of lower extremities which was unrevealing     15) knee pain after fall in august     - saw Orthopedic team  - steroid injection was given     16) intracranial hemorrhage with mass effect requiring craniotomy on 2/11/2020     - now is status post replacement of cranioplasty in April 2020     It was a pleasure evaluating your patient  Thank you for allowing our team to participate in the care of Ms Pedro Lewis  Please do not hesitate to contact our team if further issues/questions shall arise in the interim       I spent 18 minutes minutes directly with the patient during this visit      [address] acknowledges that she has consented to an online visit or consultation  She understands that the online visit is based solely on information provided by her, and that, in the absence of a face-to-face physical evaluation by the physician, the diagnosis she receives is both limited and provisional in terms of accuracy and completeness  This is not intended to replace a full medical face-to-face evaluation by the physician  Ray Simons understands and accepts these terms

## 2020-08-05 NOTE — PROGRESS NOTES
Virtual Regular Visit      Assessment/Plan:    Problem List Items Addressed This Visit     None               Reason for visit is   Chief Complaint   Patient presents with    Virtual Regular Visit        Encounter provider Loly Mckeon MD    Provider located at [address] [place] [phone]      Recent Visits  No visits were found meeting these conditions  Showing recent visits within past 7 days and meeting all other requirements     Future Appointments  No visits were found meeting these conditions  Showing future appointments within next 150 days and meeting all other requirements        The patient was identified by name and date of birth  July Enriquez was informed that this is a telemedicine visit and that the visit is being conducted through phone and patient was informed that this is not a secure, HIPAA-complaint platform  She agrees to proceed     My office door was closed  No one else was in the room  She acknowledged consent and understanding of privacy and security of the video platform  The patient has agreed to participate and understands they can discontinue the visit at any time  Patient is aware this is a billable service  It was my intent to perform this visit via video technology but the patient was not able to do a video connection so the visit was completed via audio telephone only  Pt preference    Subjective  July Enriquez is a [de-identified] y o  female who is feeling better  Saturday was not feeling well  Feeling dizzy, cold, and felt depressed mood and cried so went for help at urgent care         HPI     Past Medical History:   Diagnosis Date    Anxiety     Arthritis     joints    Cataract     Disease of thyroid gland     Eczema     GERD (gastroesophageal reflux disease)     Gout     Heart failure (HCC)     Hepatitis     Hiatal hernia     Hypertension     Onychomycosis     last assessed: 16    Orthopnea     resolved: 16    Pseudogout of left wrist     Sleep apnea     wears CPAP    Stroke [facility])        Past Surgical History:   Procedure Laterality Date    ABDOMINAL SURGERY          BRAIN HEMATOMA EVACUATION Right 2020    Procedure: Right decompressive craniectomy and evacuation of intraparenchymal hematoma; Surgeon: Trish Garcia MD;  [facility];  Service: Neurosurgery    BREAST SURGERY Right     cyst removal    CARPAL TUNNEL RELEASE Left     CARPAL TUNNEL RELEASE Right     CATARACT EXTRACTION       SECTION  1960    x1    COLONOSCOPY N/A 2018    Procedure: COLONOSCOPY;  Surgeon: Jazmine Kam MD;  Location: [place] [address] GI LAB; Service: Gastroenterology    DILATION AND CURETTAGE OF UTERUS  1967    EYE SURGERY Left 2006    cataracts    HERNIA REPAIR      umbilical hernia    INCISIONAL HERNIA REPAIR      incarcerated    KNEE ARTHROSCOPY Right     NV REPLACE SKULL PLATE/FLAP Right 1269    Procedure: right frontotemporal replacement cranioplasty;  Surgeon: Trish Garcia MD;  [facility];  Service: Neurosurgery    NV XCAPSL CTRC RMVL INSJ IO LENS PROSTH W/O ECP Right 3/27/2017    Procedure: EXTRACTION EXTRACAPSULAR CATARACT PHACO INTRAOCULAR LENS (IOL);   Surgeon: Mckay Oneill MD;  Location[facility] MAIN OR;  Service: Ophthalmology       Current Outpatient Medications   Medication Sig Dispense Refill    amLODIPine (NORVASC) 10 mg tablet Take 1 tablet (10 mg total) by mouth daily 90 tablet 1    atorvastatin (LIPITOR) 40 mg tablet Take 1 tablet (40 mg total) by mouth every evening 90 tablet 1    bumetanide (BUMEX) 2 mg tablet Take 1 tablet (2 mg total) by mouth daily 135 tablet 3    levothyroxine 112 mcg tablet Take 1 tablet (112 mcg total) by mouth daily 90 tablet 1    losartan (COZAAR) 25 mg tablet Take 2 tab in AM and 1 tab in  tablet 3    omeprazole (PriLOSEC) 20 mg delayed release capsule Take 1 capsule (20 mg total) by mouth every morning 90 capsule 1    spironolactone (ALDACTONE) 25 mg tablet Take 0 5 tablets (12 5 mg total) by mouth 2 (two) times a day (Patient taking differently: Take 12 5 mg by mouth daily ) 45 tablet 3    tobramycin (TOBREX) 0 3 % SOLN Administer 1 drop to the right eye 2 (two) times a day 1 Bottle 0     No current facility-administered medications for this visit  No Known Allergies    Review of Systems    Video Exam    There were no vitals filed for this visit  Physical Exam        2/2020 - February with 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 on February 11th  Completed course of Keppra for seizure prophylaxis  3/30/2020 - losartan was increased to 50 mg twice a day from 50 in the morning and 25 in the evening due to high blood pressures    4/3/2020 - spironolactone 12 5 mg daily was started due to continued high blood pressure    4/2020 - patient had presented for replacement current of cranioplasty on April 6  And subsequently was admitted to the rehab center postoperatively      21-year-old female with a past medical history of chronic kidney disease, venous stasis, HTN, hypothyroidism, lumbar canal stenosis, Anxiety, GERD, neuropathy, Gout, EF 79-76%, Grade 2 diastolic dysfunction who presents for follow up for CKD  To note, patient's  has now passed away in August 1) CKD - Prior year had nl Cr prior to aug 2016 and then had SHABANA during diarrhea episode  To note, patient also has a long standing history of HTN  Creatinine in 2013, 0 9 mg/dL  Cr early 2016 was 0 80-0 9 mg/dL; then increased starting in august 2016 to 2 3 mg/dL and has fluctuated since  CT scan in 2016, kidneys appearing normal at that time       Etiology of CKD may be long standing HTN and ATN  Baseline Cr ~ 1 1 -1 3 mg/dL     Most recent creatinine is 1 15 mg/dL in June 2020       - Urine eos 0%;   - A1c controlled prior  - UA 10-20 WBC   Monitoring  -U PCR stable  0 15 stable --> 0 18 gm/gm in 6/2020  -SPEP unrevealing  -UPEP unrevealing  - C3, C4 unrevealing  - Renal u/s with R 10 3 cm, L 10 4 cm, renal cortex 1 cm b/l; both kidneys slightly reduced in size; lower pole of R kidney is non obstructing calculus  - Pt follows with Urology team for nephrolithiasis  - No NSAIDs, the patient is not currently taking NSAID  -nuc stress test per Cardiology in Jan 2019 unrevealing  - repeat renal u/s 1/2019 - unrevealing with exception of renal cortical atrophy; but also 6 mm non shadowing calculus      Plan:  -  continue current antihypertensive regimen  Patient is on losartan 50 mg in the morning and 25 mg in the evening, spironolactone 12 5 mg daily, amlodipine 10 mg daily, Bumex 2 mg daily  -patient was on higher doses of antihypertensives over the past couple months but states that they have been reduced since the recent hospitalization  Blood pressures have remained 130s this morning  -depressed mood-advised the patient to see psychiatrist   Patient defers for now  I have message the primary care physician to inform there team also of this    I also did offer the patient to start a low-dose antidepressant today until she has a chance to review with her family doctor or psychiatrist but the patient deferred this also for now  -BMP and labwork in 1 month     2) SOB - Volume - follows with Cardiology     -on Bumetanide     3) HTN -      - cont losartan, bumex and amlodipine, spironolactone  -  due to bradycardia, beta-blocker was held prior     4) hypothyroid - as per Primary Care Physician     5) HPL - Primary Care following       6) Electrolytes - stable     7) MBD -     - Vit D 50 7 in nov 2019  - PTH improved 89 in nov 2019 --> 78 6 in June 2020     8) gout -      -monitor for flare     9) back pain - follows with Pain management     10) Colonoscopy in feb with internal hemorrhoids and polyp removed       11) alk phos elevation     -improved     12) Anemia - Hb stable     -hemoglobin stable     13) Mitral valve calcification     - per Cardiology team     14) elevated D-dimer prior-patient was given duplex of lower extremities which was unrevealing     15) knee pain after fall in august     - saw Orthopedic team  - steroid injection was given     16) intracranial hemorrhage with mass effect requiring craniotomy on 2/11/2020     - now is status post replacement of cranioplasty in April 2020     It was a pleasure evaluating your patient  Thank you for allowing our team to participate in the care of Ms Dung Gallardo  Please do not hesitate to contact our team if further issues/questions shall arise in the interim       I spent 18 minutes minutes directly with the patient during this visit      [address] acknowledges that she has consented to an online visit or consultation  She understands that the online visit is based solely on information provided by her, and that, in the absence of a face-to-face physical evaluation by the physician, the diagnosis she receives is both limited and provisional in terms of accuracy and completeness  This is not intended to replace a full medical face-to-face evaluation by the physician  Mckay Stratton understands and accepts these terms

## 2020-08-13 ENCOUNTER — OFFICE VISIT (OUTPATIENT)
Dept: FAMILY MEDICINE CLINIC | Facility: CLINIC | Age: 80
End: 2020-08-13
Payer: MEDICARE

## 2020-08-13 VITALS
HEART RATE: 97 BPM | WEIGHT: 191.8 LBS | SYSTOLIC BLOOD PRESSURE: 126 MMHG | DIASTOLIC BLOOD PRESSURE: 80 MMHG | BODY MASS INDEX: 36.21 KG/M2 | RESPIRATION RATE: 16 BRPM | OXYGEN SATURATION: 99 % | TEMPERATURE: 98.1 F | HEIGHT: 61 IN

## 2020-08-13 DIAGNOSIS — F41.9 ANXIETY: Primary | ICD-10-CM

## 2020-08-13 DIAGNOSIS — E03.9 HYPOTHYROIDISM, UNSPECIFIED TYPE: Chronic | ICD-10-CM

## 2020-08-13 PROCEDURE — 3079F DIAST BP 80-89 MM HG: CPT | Performed by: FAMILY MEDICINE

## 2020-08-13 PROCEDURE — 3074F SYST BP LT 130 MM HG: CPT | Performed by: FAMILY MEDICINE

## 2020-08-13 PROCEDURE — 3044F HG A1C LEVEL LT 7.0%: CPT | Performed by: FAMILY MEDICINE

## 2020-08-13 PROCEDURE — 4040F PNEUMOC VAC/ADMIN/RCVD: CPT | Performed by: FAMILY MEDICINE

## 2020-08-13 PROCEDURE — 3066F NEPHROPATHY DOC TX: CPT | Performed by: FAMILY MEDICINE

## 2020-08-13 PROCEDURE — 1160F RVW MEDS BY RX/DR IN RCRD: CPT | Performed by: FAMILY MEDICINE

## 2020-08-13 PROCEDURE — 99214 OFFICE O/P EST MOD 30 MIN: CPT | Performed by: FAMILY MEDICINE

## 2020-08-13 PROCEDURE — 3008F BODY MASS INDEX DOCD: CPT | Performed by: FAMILY MEDICINE

## 2020-08-13 PROCEDURE — 1036F TOBACCO NON-USER: CPT | Performed by: FAMILY MEDICINE

## 2020-08-13 RX ORDER — LEVOTHYROXINE SODIUM 0.1 MG/1
100 TABLET ORAL DAILY
Qty: 100 TABLET | Refills: 0 | Status: SHIPPED | OUTPATIENT
Start: 2020-08-13 | End: 2020-09-21

## 2020-08-13 RX ORDER — DULOXETIN HYDROCHLORIDE 20 MG/1
20 CAPSULE, DELAYED RELEASE ORAL DAILY
Qty: 90 CAPSULE | Refills: 0 | Status: SHIPPED | OUTPATIENT
Start: 2020-08-13 | End: 2020-09-21

## 2020-08-13 NOTE — PROGRESS NOTES
Subjective:      Patient ID: Jerman Young is a [de-identified] y o  female  Patient is here with her son  There have been recent concerns about her feeling sad and having frequent crying spells  Patient states that symptoms started after she lost her   Patient moved in with her son's family  Patient states   that she has a very supportive family  Patient states that she is well connected to her family and friends through social media  Likes to keep her engaged to with household work, on her computer and her phone  However there are times that she feels sad, and upset about minor things  Patient states that she is very emotional   Also states that today is a good day and that she feels very happy and last to have a very supportive family  Anxiety   Presents for initial visit  Onset was 1 to 6 months ago  The problem has been gradually worsening  Symptoms include excessive worry, irritability and nervous/anxious behavior  Patient reports no chest pain, decreased concentration, depressed mood, insomnia, palpitations, shortness of breath or suicidal ideas  Symptoms occur most days  The severity of symptoms is causing significant distress  The symptoms are aggravated by family issues (mediacl problems)  The quality of sleep is good  There are no known risk factors  Past treatments include nothing  Patient has history of hypothyroidism  Recent TSH was 0 7  Patient is currently on 112 micrograms of levothyroxine  I would reduce the dose of levothyroxine to 100 micrograms      Past Medical History:   Diagnosis Date    Anxiety     Arthritis     joints    Cataract     Disease of thyroid gland     Eczema     GERD (gastroesophageal reflux disease)     Gout     Heart failure (HCC)     Hepatitis     Hiatal hernia     Hypertension     Onychomycosis     last assessed: 4/29/16    Orthopnea     resolved: 1/8/16    Pseudogout of left wrist     Sleep apnea     wears CPAP    Stroke [facility])        Family History   Problem Relation Age of Onset    Diabetes Mother     Coronary artery disease Mother     Arthritis Mother     Hypertension Mother     Hypertension Father     Diabetes Brother     Diabetes Son     Arthritis Family        Past Surgical History:   Procedure Laterality Date    ABDOMINAL SURGERY          BRAIN HEMATOMA EVACUATION Right 2020    Procedure: Right decompressive craniectomy and evacuation of intraparenchymal hematoma; Surgeon: Caitlin Osuna MD;  [facility];  Service: Neurosurgery    BREAST SURGERY Right     cyst removal    CARPAL TUNNEL RELEASE Left     CARPAL TUNNEL RELEASE Right 2004    CATARACT EXTRACTION       SECTION  1960    x1    COLONOSCOPY N/A 2018    Procedure: COLONOSCOPY;  Surgeon: Simba Aponte MD;  Location: [facility]; Service: Gastroenterology    DILATION AND CURETTAGE OF UTERUS  1967    EYE SURGERY Left 2006    cataracts    HERNIA REPAIR      umbilical hernia    INCISIONAL HERNIA REPAIR      incarcerated    KNEE ARTHROSCOPY Right     SD REPLACE SKULL PLATE/FLAP Right 5782    Procedure: right frontotemporal replacement cranioplasty;  Surgeon: Caitlin Osuna MD;  [facility];  Service: Neurosurgery    SD XCAPSL CTRC RMVL INSJ IO LENS PROSTH W/O ECP Right 3/27/2017    Procedure: EXTRACTION EXTRACAPSULAR CATARACT PHACO INTRAOCULAR LENS (IOL); Surgeon: Maria Elena Santamaria MD;  Location: [facility];  Service: Ophthalmology        reports that she has never smoked  She has never used smokeless tobacco  She reports current alcohol use  She reports that she does not use drugs        Current Outpatient Medications:     amLODIPine (NORVASC) 10 mg tablet, Take 1 tablet (10 mg total) by mouth daily, Disp: 90 tablet, Rfl: 1    atorvastatin (LIPITOR) 40 mg tablet, Take 1 tablet (40 mg total) by mouth every evening, Disp: 90 tablet, Rfl: 1    bumetanide (BUMEX) 2 mg tablet, Take 1 tablet (2 mg total) by mouth daily, Disp: 135 tablet, Rfl: 3    losartan (COZAAR) 25 mg tablet, Take 2 tab in AM and 1 tab in PM, Disp: 180 tablet, Rfl: 3    omeprazole (PriLOSEC) 20 mg delayed release capsule, Take 1 capsule (20 mg total) by mouth every morning, Disp: 90 capsule, Rfl: 1    spironolactone (ALDACTONE) 25 mg tablet, Take 0 5 tablets (12 5 mg total) by mouth daily, Disp: 45 tablet, Rfl: 3    tobramycin (TOBREX) 0 3 % SOLN, Administer 1 drop to the right eye 2 (two) times a day, Disp: 1 Bottle, Rfl: 0    DULoxetine (CYMBALTA) 20 mg capsule, Take 1 capsule (20 mg total) by mouth daily, Disp: 90 capsule, Rfl: 0    levothyroxine 100 mcg tablet, Take 1 tablet (100 mcg total) by mouth daily, Disp: 100 tablet, Rfl: 0    The following portions of the patient's history were reviewed and updated as appropriate: allergies, current medications, past family history, past medical history, past social history, past surgical history and problem list     Review of Systems   Constitutional: Positive for irritability  Eyes: Negative  Respiratory: Negative  Negative for shortness of breath  Cardiovascular: Negative  Negative for chest pain and palpitations  Gastrointestinal: Negative  Endocrine: Negative  Genitourinary: Negative  Musculoskeletal: Negative  Negative for myalgias  Neurological: Negative  Negative for headaches  Psychiatric/Behavioral: Negative for decreased concentration and suicidal ideas  The patient is nervous/anxious  The patient does not have insomnia  Objective:    /80   Pulse 97   Temp 98 1 °F (36 7 °C)   Resp 16   Ht 5' 1" (1 549 m)   Wt 87 kg (191 lb 12 8 oz)   SpO2 99%   BMI 36 24 kg/m²      Physical Exam  Constitutional:       Appearance: She is well-developed  Cardiovascular:      Rate and Rhythm: Normal rate and regular rhythm  Heart sounds: Normal heart sounds  Pulmonary:      Effort: Pulmonary effort is normal       Breath sounds: Normal breath sounds  Abdominal:      General: Bowel sounds are normal       Palpations: Abdomen is soft  Neurological:      Mental Status: She is alert and oriented to person, place, and time  Psychiatric:         Mood and Affect: Mood normal          Behavior: Behavior normal          Thought Content: Thought content normal          Judgment: Judgment normal            No results found for this or any previous visit (from the past 1008 hour(s))  Assessment/Plan:         Problem List Items Addressed This Visit        Endocrine    Hypothyroidism (Chronic)     TSH 0 7  Patient is on 112 micrograms levothyroxine  Recent episodes of frequent crying spells and anxious behavior  Would reduce levothyroxine to 100 micrograms  Recheck TSH after 6 to 8 weeks         Relevant Medications    levothyroxine 100 mcg tablet    Other Relevant Orders    TSH, 3rd generation with Free T4 reflex       Other    Anxiety - Primary     Patient started on low dose of Cymbalta  Made aware of the common side effects of the medication  Patient will call the office if she experiences any side effects not will follow her up after 4 to 6 weeks for review of symptoms  Patient agreeable to start the medication  Patient also advised therapy however she declines, patient wants to wait to see how medications work for her  Relevant Medications    DULoxetine (CYMBALTA) 20 mg capsule        I have spent 25 minutes with Patient and family today in which greater than 50% of this time was spent in counseling/coordination of care regarding Diagnostic results, Prognosis, Risks and benefits of tx options, Intructions for management, Patient and family education, Importance of tx compliance, Risk factor reductions and Impressions

## 2020-08-14 ENCOUNTER — TELEPHONE (OUTPATIENT)
Dept: NEPHROLOGY | Facility: CLINIC | Age: 80
End: 2020-08-14

## 2020-08-14 ENCOUNTER — LAB (OUTPATIENT)
Dept: LAB | Facility: CLINIC | Age: 80
End: 2020-08-14
Payer: MEDICARE

## 2020-08-14 DIAGNOSIS — E03.9 HYPOTHYROIDISM, UNSPECIFIED TYPE: ICD-10-CM

## 2020-08-14 DIAGNOSIS — N18.30 CKD (CHRONIC KIDNEY DISEASE), STAGE III (HCC): ICD-10-CM

## 2020-08-14 LAB
ANION GAP SERPL CALCULATED.3IONS-SCNC: 5 MMOL/L (ref 4–13)
BACTERIA UR QL AUTO: ABNORMAL /HPF
BILIRUB UR QL STRIP: NEGATIVE
BUN SERPL-MCNC: 25 MG/DL (ref 5–25)
CALCIUM SERPL-MCNC: 9.4 MG/DL (ref 8.3–10.1)
CHLORIDE SERPL-SCNC: 104 MMOL/L (ref 100–108)
CLARITY UR: CLEAR
CO2 SERPL-SCNC: 32 MMOL/L (ref 21–32)
COLOR UR: YELLOW
CREAT SERPL-MCNC: 1.12 MG/DL (ref 0.6–1.3)
CREAT UR-MCNC: 75 MG/DL
ERYTHROCYTE [DISTWIDTH] IN BLOOD BY AUTOMATED COUNT: 15.6 % (ref 11.6–15.1)
GFR SERPL CREATININE-BSD FRML MDRD: 47 ML/MIN/1.73SQ M
GLUCOSE P FAST SERPL-MCNC: 98 MG/DL (ref 65–99)
GLUCOSE UR STRIP-MCNC: NEGATIVE MG/DL
HCT VFR BLD AUTO: 36.4 % (ref 34.8–46.1)
HGB BLD-MCNC: 11.6 G/DL (ref 11.5–15.4)
HGB UR QL STRIP.AUTO: NEGATIVE
HYALINE CASTS #/AREA URNS LPF: ABNORMAL /LPF
KETONES UR STRIP-MCNC: NEGATIVE MG/DL
LEUKOCYTE ESTERASE UR QL STRIP: ABNORMAL
MAGNESIUM SERPL-MCNC: 2.2 MG/DL (ref 1.6–2.6)
MCH RBC QN AUTO: 27.2 PG (ref 26.8–34.3)
MCHC RBC AUTO-ENTMCNC: 31.9 G/DL (ref 31.4–37.4)
MCV RBC AUTO: 85 FL (ref 82–98)
NITRITE UR QL STRIP: NEGATIVE
NON-SQ EPI CELLS URNS QL MICRO: ABNORMAL /HPF
PH UR STRIP.AUTO: 6 [PH]
PHOSPHATE SERPL-MCNC: 3.5 MG/DL (ref 2.3–4.1)
PLATELET # BLD AUTO: 157 THOUSANDS/UL (ref 149–390)
PMV BLD AUTO: 11.5 FL (ref 8.9–12.7)
POTASSIUM SERPL-SCNC: 3.9 MMOL/L (ref 3.5–5.3)
PROT UR STRIP-MCNC: NEGATIVE MG/DL
PROT UR-MCNC: <6 MG/DL
PROT/CREAT UR: <0.08 MG/G{CREAT} (ref 0–0.1)
RBC # BLD AUTO: 4.26 MILLION/UL (ref 3.81–5.12)
RBC #/AREA URNS AUTO: ABNORMAL /HPF
SODIUM SERPL-SCNC: 141 MMOL/L (ref 136–145)
SP GR UR STRIP.AUTO: 1.02 (ref 1–1.03)
T4 FREE SERPL-MCNC: 1.5 NG/DL (ref 0.76–1.46)
TSH SERPL DL<=0.05 MIU/L-ACNC: 0.32 UIU/ML (ref 0.36–3.74)
UROBILINOGEN UR QL STRIP.AUTO: 0.2 E.U./DL
WBC # BLD AUTO: 5.36 THOUSAND/UL (ref 4.31–10.16)
WBC #/AREA URNS AUTO: ABNORMAL /HPF

## 2020-08-14 PROCEDURE — 84156 ASSAY OF PROTEIN URINE: CPT

## 2020-08-14 PROCEDURE — 80048 BASIC METABOLIC PNL TOTAL CA: CPT

## 2020-08-14 PROCEDURE — 81001 URINALYSIS AUTO W/SCOPE: CPT

## 2020-08-14 PROCEDURE — 36415 COLL VENOUS BLD VENIPUNCTURE: CPT

## 2020-08-14 PROCEDURE — 84443 ASSAY THYROID STIM HORMONE: CPT

## 2020-08-14 PROCEDURE — 84100 ASSAY OF PHOSPHORUS: CPT

## 2020-08-14 PROCEDURE — 83735 ASSAY OF MAGNESIUM: CPT

## 2020-08-14 PROCEDURE — 82570 ASSAY OF URINE CREATININE: CPT

## 2020-08-14 PROCEDURE — 85027 COMPLETE CBC AUTOMATED: CPT

## 2020-08-14 PROCEDURE — 84439 ASSAY OF FREE THYROXINE: CPT

## 2020-08-14 NOTE — ASSESSMENT & PLAN NOTE
TSH 0 7  Patient is on 112 micrograms levothyroxine  Recent episodes of frequent crying spells and anxious behavior  Would reduce levothyroxine to 100 micrograms    Recheck TSH after 6 to 8 weeks

## 2020-08-14 NOTE — TELEPHONE ENCOUNTER
Thank you      Spoke with patient  She has noticed this past 3 weeks  Minimal      Pt states no other issues  No pain, discomfort, does not have fullness of bladder sensation  Could be a detrusor dysfunction? Age related? Vs other    Advised u/s and PVR  Pt states is feeling well though otherwise     Advised then, ok to monitor and call if worsens

## 2020-08-14 NOTE — TELEPHONE ENCOUNTER
Talked with pt  She has no UA symptoms  The only thing she mentioned is that when she voids she stays on the potty because inevitably she dribbles a little more urine  But no other symptoms  Lab work mailed to pt for December visit     ----- Message from Dewey Ojeda MD sent at 8/14/2020  1:44 PM EDT -----  Hello    Patient normally is followed up by Ms Ryan Saenz  Please let pt know renal function  - UA with small amount of WBC  Please make sure pt does not have any urinary symptoms     - please have pt check BMP, CBC, UA, UPCR in 2-3 months (before next appt)    Thank you    np

## 2020-08-14 NOTE — ASSESSMENT & PLAN NOTE
Patient started on low dose of Cymbalta  Made aware of the common side effects of the medication  Patient will call the office if she experiences any side effects not will follow her up after 4 to 6 weeks for review of symptoms  Patient agreeable to start the medication  Patient also advised therapy however she declines, patient wants to wait to see how medications work for her

## 2020-08-14 NOTE — RESULT ENCOUNTER NOTE
Hello    Patient normally is followed up by Ms Raul Copeland  Please let pt know renal function  - UA with small amount of WBC  Please make sure pt does not have any urinary symptoms     - please have pt check BMP, CBC, UA, UPCR in 2-3 months (before next appt)    Thank you    np

## 2020-09-11 ENCOUNTER — APPOINTMENT (OUTPATIENT)
Dept: LAB | Facility: CLINIC | Age: 80
End: 2020-09-11
Payer: MEDICARE

## 2020-09-11 DIAGNOSIS — E03.9 HYPOTHYROIDISM, UNSPECIFIED TYPE: ICD-10-CM

## 2020-09-11 LAB — TSH SERPL DL<=0.05 MIU/L-ACNC: 0.81 UIU/ML (ref 0.36–3.74)

## 2020-09-11 PROCEDURE — 84443 ASSAY THYROID STIM HORMONE: CPT

## 2020-09-11 PROCEDURE — 36415 COLL VENOUS BLD VENIPUNCTURE: CPT

## 2020-09-16 NOTE — PROGRESS NOTES
Pt DC secondary to interruption in care secondary to COVID 19  Pt does not wish to return per prior phone call    Please see last Re-eval/ Eval for progress towards goals

## 2020-09-21 ENCOUNTER — OFFICE VISIT (OUTPATIENT)
Dept: FAMILY MEDICINE CLINIC | Facility: CLINIC | Age: 80
End: 2020-09-21
Payer: MEDICARE

## 2020-09-21 VITALS
OXYGEN SATURATION: 98 % | TEMPERATURE: 97.8 F | DIASTOLIC BLOOD PRESSURE: 78 MMHG | HEIGHT: 61 IN | RESPIRATION RATE: 16 BRPM | HEART RATE: 95 BPM | SYSTOLIC BLOOD PRESSURE: 136 MMHG | BODY MASS INDEX: 36.17 KG/M2 | WEIGHT: 191.6 LBS

## 2020-09-21 DIAGNOSIS — K21.9 GASTROESOPHAGEAL REFLUX DISEASE WITHOUT ESOPHAGITIS: ICD-10-CM

## 2020-09-21 DIAGNOSIS — F41.9 ANXIETY: ICD-10-CM

## 2020-09-21 DIAGNOSIS — E03.9 HYPOTHYROIDISM, UNSPECIFIED TYPE: Primary | Chronic | ICD-10-CM

## 2020-09-21 PROCEDURE — 99213 OFFICE O/P EST LOW 20 MIN: CPT | Performed by: FAMILY MEDICINE

## 2020-09-21 RX ORDER — OMEPRAZOLE 20 MG/1
20 CAPSULE, DELAYED RELEASE ORAL EVERY MORNING
Qty: 90 CAPSULE | Refills: 1 | Status: SHIPPED | OUTPATIENT
Start: 2020-09-21 | End: 2021-02-15 | Stop reason: SDUPTHER

## 2020-09-21 RX ORDER — LEVOTHYROXINE SODIUM 88 UG/1
88 TABLET ORAL DAILY
Qty: 90 TABLET | Refills: 0 | Status: SHIPPED | OUTPATIENT
Start: 2020-09-21 | End: 2020-12-01 | Stop reason: SDUPTHER

## 2020-09-21 NOTE — PROGRESS NOTES
Subjective:      Patient ID: Angela Rasmussen is a [de-identified] y o  female  Thyroid Problem   Presents for follow-up visit  Symptoms include anxiety (Improved)  Patient reports no depressed mood, fatigue, hair loss, heat intolerance, palpitations or tremors  The symptoms have been stable (Levothyroxine 100 micrograms)  Anxiety   Presents for follow-up visit  Symptoms include nervous/anxious behavior (Improved)  Patient reports no chest pain, depressed mood, excessive worry, irritability, palpitations, panic, restlessness, shortness of breath or suicidal ideas  Symptoms occur occasionally  Nighttime awakenings: none  Compliance with medications: Cymbalta 20 mg  Stopped thedue to side effects- Fatigue  Treatment side effects: Fatigue  Past Medical History:   Diagnosis Date    Anxiety     Arthritis     joints    Cataract     Disease of thyroid gland     Eczema     GERD (gastroesophageal reflux disease)     Gout     Heart failure (HCC)     Hepatitis     Hiatal hernia     Hypertension     Onychomycosis     last assessed: 16    Orthopnea     resolved: 16    Pseudogout of left wrist     Sleep apnea     wears CPAP    [facility]roke [facility])        Family History   Problem Relation Age of Onset    Diabetes Mother     Coronary artery disease Mother     Arthritis Mother     Hypertension Mother     Hypertension Father     Diabetes Brother     Diabetes Son     Arthritis Family        Past Surgical History:   Procedure Laterality Date    ABDOMINAL SURGERY          BRAIN HEMATOMA EVACUATION Right 2020    Procedure: Right decompressive craniectomy and evacuation of intraparenchymal hematoma;   Surgeon: Nakia Swanson MD;  [facility];  Service: Neurosurgery    BREAST SURGERY Right     cyst removal    CARPAL TUNNEL RELEASE Left     CARPAL TUNNEL RELEASE Right     CATARACT EXTRACTION       SECTION  1960    x1    COLONOSCOPY N/A 2018    Procedure: COLONOSCOPY;  Surgeon: Jac Guy MD;  Location: [facility]; Service: Gastroenterology    DILATION AND CURETTAGE OF UTERUS  1967    EYE SURGERY Left 2006    cataracts    HERNIA REPAIR      umbilical hernia    INCISIONAL HERNIA REPAIR      incarcerated    KNEE ARTHROSCOPY Right     MO REPLACE SKULL PLATE/FLAP Right 3/3/6775    Procedure: right frontotemporal replacement cranioplasty;  Surgeon: Jeferson Chávez MD;  Location:  MAIN OR;  Service: Neurosurgery    MO XCAPSL CTRC RMVL INSJ IO LENS PROSTH W/O ECP Right 3/27/2017    Procedure: EXTRACTION EXTRACAPSULAR CATARACT PHACO INTRAOCULAR LENS (IOL); Surgeon: Clara Espinoza MD;  Location: [facility] MAIN OR;  Service: Ophthalmology        reports that she has never smoked  She has never used smokeless tobacco  She reports current alcohol use  She reports that she does not use drugs        Current Outpatient Medications:     amLODIPine (NORVASC) 10 mg tablet, Take 1 tablet (10 mg total) by mouth daily, Disp: 90 tablet, Rfl: 1    atorvastatin (LIPITOR) 40 mg tablet, Take 1 tablet (40 mg total) by mouth every evening, Disp: 90 tablet, Rfl: 1    bumetanide (BUMEX) 2 mg tablet, Take 1 tablet (2 mg total) by mouth daily, Disp: 135 tablet, Rfl: 3    DULoxetine (CYMBALTA) 20 mg capsule, Take 1 capsule (20 mg total) by mouth daily, Disp: 90 capsule, Rfl: 0    losartan (COZAAR) 25 mg tablet, Take 2 tab in AM and 1 tab in PM, Disp: 180 tablet, Rfl: 3    omeprazole (PriLOSEC) 20 mg delayed release capsule, Take 1 capsule (20 mg total) by mouth every morning, Disp: 90 capsule, Rfl: 1    spironolactone (ALDACTONE) 25 mg tablet, Take 0 5 tablets (12 5 mg total) by mouth daily, Disp: 45 tablet, Rfl: 3    levothyroxine 88 mcg tablet, Take 1 tablet (88 mcg total) by mouth daily, Disp: 90 tablet, Rfl: 0    The following portions of the patient's history were reviewed and updated as appropriate: allergies, current medications, past family history, past medical history, past social history, past surgical history and problem list     Review of Systems   Constitutional: Negative  Negative for fatigue and irritability  Eyes: Negative  Respiratory: Negative  Negative for shortness of breath  Cardiovascular: Negative  Negative for chest pain and palpitations  Gastrointestinal: Negative  Endocrine: Negative  Negative for heat intolerance  Genitourinary: Negative  Musculoskeletal: Negative  Negative for myalgias  Neurological: Negative  Negative for tremors and headaches  Psychiatric/Behavioral: Negative for suicidal ideas  The patient is nervous/anxious (Improved)  Objective:    /78   Pulse 95   Temp 97 8 °F (36 6 °C)   Resp 16   Ht 5' 1" (1 549 m)   Wt 86 9 kg (191 lb 9 6 oz)   SpO2 98%   BMI 36 20 kg/m²      Physical Exam  Constitutional:       Appearance: She is well-developed  Eyes:      Pupils: Pupils are equal, round, and reactive to light  Neck:      Musculoskeletal: Normal range of motion  Cardiovascular:      Rate and Rhythm: Normal rate and regular rhythm  Heart sounds: Normal heart sounds  Pulmonary:      Effort: Pulmonary effort is normal       Breath sounds: Normal breath sounds  Abdominal:      General: Bowel sounds are normal       Palpations: Abdomen is soft  Musculoskeletal: Normal range of motion  Neurological:      Mental Status: She is alert and oriented to person, place, and time     Psychiatric:         Behavior: Behavior normal          Judgment: Judgment normal            Recent Results (from the past 1008 hour(s))   Basic metabolic panel    Collection Time: 08/14/20  8:44 AM   Result Value Ref Range    Sodium 141 136 - 145 mmol/L    Potassium 3 9 3 5 - 5 3 mmol/L    Chloride 104 100 - 108 mmol/L    CO2 32 21 - 32 mmol/L    ANION GAP 5 4 - 13 mmol/L    BUN 25 5 - 25 mg/dL    Creatinine 1 12 0 60 - 1 30 mg/dL    Glucose, Fasting 98 65 - 99 mg/dL    Calcium 9 4 8 3 - 10 1 mg/dL    eGFR 47 ml/min/1 73sq m   CBC    Collection Time: 08/14/20  8:44 AM   Result Value Ref Range    WBC 5 36 4 31 - 10 16 Thousand/uL    RBC 4 26 3 81 - 5 12 Million/uL    Hemoglobin 11 6 11 5 - 15 4 g/dL    Hematocrit 36 4 34 8 - 46 1 %    MCV 85 82 - 98 fL    MCH 27 2 26 8 - 34 3 pg    MCHC 31 9 31 4 - 37 4 g/dL    RDW 15 6 (H) 11 6 - 15 1 %    Platelets 612 032 - 510 Thousands/uL    MPV 11 5 8 9 - 12 7 fL   Magnesium    Collection Time: 08/14/20  8:44 AM   Result Value Ref Range    Magnesium 2 2 1 6 - 2 6 mg/dL   Phosphorus    Collection Time: 08/14/20  8:44 AM   Result Value Ref Range    Phosphorus 3 5 2 3 - 4 1 mg/dL   Protein / creatinine ratio, urine    Collection Time: 08/14/20  8:44 AM   Result Value Ref Range    Creatinine, Ur 75 0 mg/dL    Protein Urine Random <6 mg/dL    Prot/Creat Ratio, Ur <0 08 0 00 - 0 10   Urinalysis with microscopic    Collection Time: 08/14/20  8:44 AM   Result Value Ref Range    Clarity, UA Clear     Color, UA Yellow     Specific [place], UA 1 016 1 003 - 1 030    pH, UA 6 0 4 5, 5 0, 5 5, 6 0, 6 5, 7 0, 7 5, 8 0    Glucose, UA Negative Negative mg/dl    Ketones, UA Negative Negative mg/dl    Blood, UA Negative Negative    Protein, UA Negative Negative mg/dl    Nitrite, UA Negative Negative    Bilirubin, UA Negative Negative    Urobilinogen, UA 0 2 0 2, 1 0 E U /dl E U /dl    Leukocytes, UA Moderate (A) Negative    WBC, UA 10-20 (A) None Seen, 0-5, 5-55, 5-65 /hpf    RBC, UA None Seen None Seen, 0-5 /hpf    Hyaline Casts, UA None Seen None Seen /lpf    Bacteria, UA None Seen None Seen, Occasional /hpf    Epithelial Cells None Seen None Seen, Occasional /hpf   TSH, 3rd generation with Free T4 reflex    Collection Time: 08/14/20  8:44 AM   Result Value Ref Range    TSH 3RD GENERATON 0 318 (L) 0 358 - 3 740 uIU/mL   T4, free    Collection Time: 08/14/20  8:44 AM   Result Value Ref Range    Free T4 1 50 (H) 0 76 - 1 46 ng/dL   TSH, 3rd generation with Free T4 reflex    Collection Time: 09/11/20 8:43 AM   Result Value Ref Range    TSH 3RD GENERATON 0 814 0 358 - 3 740 uIU/mL       Assessment/Plan:         Problem List Items Addressed This Visit        Digestive    Gastroesophageal reflux disease without esophagitis (Chronic)     Stable on omeprazole 20 milligram   Continue same  Relevant Medications    omeprazole (PriLOSEC) 20 mg delayed release capsule       Endocrine    Hypothyroidism - Primary (Chronic)     TSH improved from 0 3 to 0 8 on reducing the dose of levothyroxine from 125 to 100  Patient is reporting mild improvement in her anxiety  Will further reduce the dose of levothyroxine to 88 micrograms  Recheck TSH after 3 months  Relevant Medications    levothyroxine 88 mcg tablet    Other Relevant Orders    TSH, 3rd generation with Free T4 reflex    TSH, 3rd generation with Free T4 reflex       Other    Anxiety     Patient's anxiety has improved significantly  Patient stop taking Cymbalta within 1 week of starting since the medication made her extremely tired  Patient does not want to restart Cymbalta  Would like to monitor her symptoms of medication  Patient is agreeable that if her symptoms recur then she will try taking the medication again

## 2020-09-21 NOTE — ASSESSMENT & PLAN NOTE
Patient's anxiety has improved significantly  Patient stop taking Cymbalta within 1 week of starting since the medication made her extremely tired  Patient does not want to restart Cymbalta  Would like to monitor her symptoms of medication  Patient is agreeable that if her symptoms recur then she will try taking the medication again
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
## 2020-09-22 ENCOUNTER — TELEPHONE (OUTPATIENT)
Dept: NEPHROLOGY | Facility: CLINIC | Age: 80
End: 2020-09-22

## 2020-09-22 NOTE — TELEPHONE ENCOUNTER
A message was left requesting a call back to the office to schedule a December follow up with provider

## 2020-09-23 DIAGNOSIS — D50.9 IRON DEFICIENCY ANEMIA, UNSPECIFIED IRON DEFICIENCY ANEMIA TYPE: ICD-10-CM

## 2020-09-23 DIAGNOSIS — N18.32 CHRONIC KIDNEY DISEASE (CKD) STAGE G3B/A3, MODERATELY DECREASED GLOMERULAR FILTRATION RATE (GFR) BETWEEN 30-44 ML/MIN/1.73 SQUARE METER AND ALBUMINURIA CREATININE RATIO GREATER THAN 300 MG/G (HCC): Primary | ICD-10-CM

## 2020-10-08 ENCOUNTER — IMMUNIZATIONS (OUTPATIENT)
Dept: FAMILY MEDICINE CLINIC | Facility: CLINIC | Age: 80
End: 2020-10-08
Payer: MEDICARE

## 2020-10-08 DIAGNOSIS — Z23 ENCOUNTER FOR IMMUNIZATION: ICD-10-CM

## 2020-10-08 PROCEDURE — G0008 ADMIN INFLUENZA VIRUS VAC: HCPCS

## 2020-10-08 PROCEDURE — 90662 IIV NO PRSV INCREASED AG IM: CPT

## 2020-10-16 ENCOUNTER — OFFICE VISIT (OUTPATIENT)
Dept: FAMILY MEDICINE CLINIC | Facility: CLINIC | Age: 80
End: 2020-10-16
Payer: MEDICARE

## 2020-10-16 VITALS
OXYGEN SATURATION: 98 % | SYSTOLIC BLOOD PRESSURE: 130 MMHG | HEIGHT: 61 IN | WEIGHT: 184 LBS | BODY MASS INDEX: 34.74 KG/M2 | TEMPERATURE: 98 F | RESPIRATION RATE: 16 BRPM | DIASTOLIC BLOOD PRESSURE: 62 MMHG | HEART RATE: 72 BPM

## 2020-10-16 DIAGNOSIS — M54.41 ACUTE RIGHT-SIDED LOW BACK PAIN WITH RIGHT-SIDED SCIATICA: Primary | ICD-10-CM

## 2020-10-16 PROCEDURE — 99214 OFFICE O/P EST MOD 30 MIN: CPT | Performed by: NURSE PRACTITIONER

## 2020-10-16 RX ORDER — PREDNISONE 20 MG/1
40 TABLET ORAL DAILY
Qty: 10 TABLET | Refills: 0 | Status: SHIPPED | OUTPATIENT
Start: 2020-10-16 | End: 2020-10-21

## 2020-11-03 ENCOUNTER — TELEMEDICINE (OUTPATIENT)
Dept: FAMILY MEDICINE CLINIC | Facility: CLINIC | Age: 80
End: 2020-11-03
Payer: MEDICARE

## 2020-11-03 VITALS — BODY MASS INDEX: 34.74 KG/M2 | HEIGHT: 61 IN | WEIGHT: 184 LBS

## 2020-11-03 DIAGNOSIS — B02.9 HERPES ZOSTER WITHOUT COMPLICATION: Primary | ICD-10-CM

## 2020-11-03 PROCEDURE — 99213 OFFICE O/P EST LOW 20 MIN: CPT | Performed by: FAMILY MEDICINE

## 2020-11-03 RX ORDER — GABAPENTIN 100 MG/1
100 CAPSULE ORAL 3 TIMES DAILY
Qty: 30 CAPSULE | Refills: 0 | Status: SHIPPED | OUTPATIENT
Start: 2020-11-03 | End: 2021-01-16 | Stop reason: SDUPTHER

## 2020-11-03 RX ORDER — VALACYCLOVIR HYDROCHLORIDE 1 G/1
1000 TABLET, FILM COATED ORAL 2 TIMES DAILY
Qty: 14 TABLET | Refills: 0 | Status: SHIPPED | OUTPATIENT
Start: 2020-11-03 | End: 2020-12-17

## 2020-11-05 ENCOUNTER — TELEMEDICINE (OUTPATIENT)
Dept: FAMILY MEDICINE CLINIC | Facility: CLINIC | Age: 80
End: 2020-11-05
Payer: MEDICARE

## 2020-11-05 VITALS
TEMPERATURE: 98 F | OXYGEN SATURATION: 98 % | SYSTOLIC BLOOD PRESSURE: 128 MMHG | DIASTOLIC BLOOD PRESSURE: 86 MMHG | WEIGHT: 183 LBS | RESPIRATION RATE: 18 BRPM | HEART RATE: 72 BPM | BODY MASS INDEX: 34.55 KG/M2 | HEIGHT: 61 IN

## 2020-11-05 DIAGNOSIS — B02.9 HERPES ZOSTER WITHOUT COMPLICATION: Primary | ICD-10-CM

## 2020-11-05 PROBLEM — H10.32 ACUTE BACTERIAL CONJUNCTIVITIS OF LEFT EYE: Status: RESOLVED | Noted: 2020-07-10 | Resolved: 2020-11-05

## 2020-11-05 PROCEDURE — 99213 OFFICE O/P EST LOW 20 MIN: CPT | Performed by: FAMILY MEDICINE

## 2020-12-01 DIAGNOSIS — I10 BENIGN ESSENTIAL HYPERTENSION: ICD-10-CM

## 2020-12-01 DIAGNOSIS — E03.9 HYPOTHYROIDISM, UNSPECIFIED TYPE: Chronic | ICD-10-CM

## 2020-12-01 RX ORDER — LEVOTHYROXINE SODIUM 88 UG/1
88 TABLET ORAL DAILY
Qty: 90 TABLET | Refills: 0 | Status: SHIPPED | OUTPATIENT
Start: 2020-12-01 | End: 2020-12-17 | Stop reason: SDUPTHER

## 2020-12-01 RX ORDER — AMLODIPINE BESYLATE 10 MG/1
10 TABLET ORAL DAILY
Qty: 90 TABLET | Refills: 0 | Status: SHIPPED | OUTPATIENT
Start: 2020-12-01 | End: 2020-12-17 | Stop reason: SDUPTHER

## 2020-12-08 ENCOUNTER — TELEPHONE (OUTPATIENT)
Dept: NEPHROLOGY | Facility: CLINIC | Age: 80
End: 2020-12-08

## 2020-12-08 ENCOUNTER — LAB (OUTPATIENT)
Dept: LAB | Facility: CLINIC | Age: 80
End: 2020-12-08
Payer: MEDICARE

## 2020-12-08 ENCOUNTER — TRANSCRIBE ORDERS (OUTPATIENT)
Dept: LAB | Facility: CLINIC | Age: 80
End: 2020-12-08

## 2020-12-08 DIAGNOSIS — D50.9 IRON DEFICIENCY ANEMIA, UNSPECIFIED IRON DEFICIENCY ANEMIA TYPE: ICD-10-CM

## 2020-12-08 DIAGNOSIS — R73.03 PREDIABETES: ICD-10-CM

## 2020-12-08 DIAGNOSIS — I10 BENIGN ESSENTIAL HYPERTENSION: ICD-10-CM

## 2020-12-08 DIAGNOSIS — D50.8 IRON DEFICIENCY ANEMIA SECONDARY TO INADEQUATE DIETARY IRON INTAKE: ICD-10-CM

## 2020-12-08 DIAGNOSIS — E03.9 HYPOTHYROIDISM, UNSPECIFIED TYPE: Chronic | ICD-10-CM

## 2020-12-08 DIAGNOSIS — E78.5 HYPERLIPIDEMIA, UNSPECIFIED HYPERLIPIDEMIA TYPE: ICD-10-CM

## 2020-12-08 DIAGNOSIS — N18.32 CHRONIC KIDNEY DISEASE (CKD) STAGE G3B/A3, MODERATELY DECREASED GLOMERULAR FILTRATION RATE (GFR) BETWEEN 30-44 ML/MIN/1.73 SQUARE METER AND ALBUMINURIA CREATININE RATIO GREATER THAN 300 MG/G (HCC): ICD-10-CM

## 2020-12-08 LAB
ALBUMIN SERPL BCP-MCNC: 3.7 G/DL (ref 3.5–5)
ALP SERPL-CCNC: 141 U/L (ref 46–116)
ALT SERPL W P-5'-P-CCNC: 18 U/L (ref 12–78)
ANION GAP SERPL CALCULATED.3IONS-SCNC: 5 MMOL/L (ref 4–13)
AST SERPL W P-5'-P-CCNC: 12 U/L (ref 5–45)
BACTERIA UR QL AUTO: ABNORMAL /HPF
BASOPHILS # BLD AUTO: 0.03 THOUSANDS/ΜL (ref 0–0.1)
BASOPHILS NFR BLD AUTO: 1 % (ref 0–1)
BILIRUB SERPL-MCNC: 0.7 MG/DL (ref 0.2–1)
BILIRUB UR QL STRIP: NEGATIVE
BUN SERPL-MCNC: 26 MG/DL (ref 5–25)
CALCIUM SERPL-MCNC: 9.8 MG/DL (ref 8.3–10.1)
CHLORIDE SERPL-SCNC: 104 MMOL/L (ref 100–108)
CHOLEST SERPL-MCNC: 115 MG/DL (ref 50–200)
CLARITY UR: CLEAR
CO2 SERPL-SCNC: 31 MMOL/L (ref 21–32)
COLOR UR: YELLOW
CREAT SERPL-MCNC: 1.13 MG/DL (ref 0.6–1.3)
CREAT UR-MCNC: 142 MG/DL
CREAT UR-MCNC: 143 MG/DL
EOSINOPHIL # BLD AUTO: 0.11 THOUSAND/ΜL (ref 0–0.61)
EOSINOPHIL NFR BLD AUTO: 2 % (ref 0–6)
ERYTHROCYTE [DISTWIDTH] IN BLOOD BY AUTOMATED COUNT: 14.6 % (ref 11.6–15.1)
EST. AVERAGE GLUCOSE BLD GHB EST-MCNC: 117 MG/DL
GFR SERPL CREATININE-BSD FRML MDRD: 46 ML/MIN/1.73SQ M
GLUCOSE P FAST SERPL-MCNC: 107 MG/DL (ref 65–99)
GLUCOSE UR STRIP-MCNC: NEGATIVE MG/DL
HBA1C MFR BLD: 5.7 %
HCT VFR BLD AUTO: 38.7 % (ref 34.8–46.1)
HDLC SERPL-MCNC: 54 MG/DL
HGB BLD-MCNC: 12.2 G/DL (ref 11.5–15.4)
HGB UR QL STRIP.AUTO: NEGATIVE
HYALINE CASTS #/AREA URNS LPF: ABNORMAL /LPF
IMM GRANULOCYTES # BLD AUTO: 0.01 THOUSAND/UL (ref 0–0.2)
IMM GRANULOCYTES NFR BLD AUTO: 0 % (ref 0–2)
KETONES UR STRIP-MCNC: NEGATIVE MG/DL
LDLC SERPL CALC-MCNC: 45 MG/DL (ref 0–100)
LEUKOCYTE ESTERASE UR QL STRIP: ABNORMAL
LYMPHOCYTES # BLD AUTO: 1.26 THOUSANDS/ΜL (ref 0.6–4.47)
LYMPHOCYTES NFR BLD AUTO: 22 % (ref 14–44)
MCH RBC QN AUTO: 28.2 PG (ref 26.8–34.3)
MCHC RBC AUTO-ENTMCNC: 31.5 G/DL (ref 31.4–37.4)
MCV RBC AUTO: 89 FL (ref 82–98)
MICROALBUMIN UR-MCNC: 36.4 MG/L (ref 0–20)
MICROALBUMIN/CREAT 24H UR: 25 MG/G CREATININE (ref 0–30)
MONOCYTES # BLD AUTO: 0.57 THOUSAND/ΜL (ref 0.17–1.22)
MONOCYTES NFR BLD AUTO: 10 % (ref 4–12)
NEUTROPHILS # BLD AUTO: 3.74 THOUSANDS/ΜL (ref 1.85–7.62)
NEUTS SEG NFR BLD AUTO: 65 % (ref 43–75)
NITRITE UR QL STRIP: NEGATIVE
NON-SQ EPI CELLS URNS QL MICRO: ABNORMAL /HPF
NONHDLC SERPL-MCNC: 61 MG/DL
NRBC BLD AUTO-RTO: 0 /100 WBCS
PH UR STRIP.AUTO: 5.5 [PH]
PLATELET # BLD AUTO: 163 THOUSANDS/UL (ref 149–390)
PMV BLD AUTO: 11.5 FL (ref 8.9–12.7)
POTASSIUM SERPL-SCNC: 4.4 MMOL/L (ref 3.5–5.3)
PROT SERPL-MCNC: 7.1 G/DL (ref 6.4–8.2)
PROT UR STRIP-MCNC: NEGATIVE MG/DL
PROT UR-MCNC: 14 MG/DL
PROT/CREAT UR: 0.1 MG/G{CREAT} (ref 0–0.1)
RBC # BLD AUTO: 4.33 MILLION/UL (ref 3.81–5.12)
RBC #/AREA URNS AUTO: ABNORMAL /HPF
SODIUM SERPL-SCNC: 140 MMOL/L (ref 136–145)
SP GR UR STRIP.AUTO: 1.02 (ref 1–1.03)
TRIGL SERPL-MCNC: 79 MG/DL
TSH SERPL DL<=0.05 MIU/L-ACNC: 1.48 UIU/ML (ref 0.36–3.74)
UROBILINOGEN UR QL STRIP.AUTO: 0.2 E.U./DL
WBC # BLD AUTO: 5.72 THOUSAND/UL (ref 4.31–10.16)
WBC #/AREA URNS AUTO: ABNORMAL /HPF

## 2020-12-08 PROCEDURE — 82570 ASSAY OF URINE CREATININE: CPT

## 2020-12-08 PROCEDURE — 83036 HEMOGLOBIN GLYCOSYLATED A1C: CPT

## 2020-12-08 PROCEDURE — 84156 ASSAY OF PROTEIN URINE: CPT

## 2020-12-08 PROCEDURE — 80061 LIPID PANEL: CPT

## 2020-12-08 PROCEDURE — 80053 COMPREHEN METABOLIC PANEL: CPT

## 2020-12-08 PROCEDURE — 36415 COLL VENOUS BLD VENIPUNCTURE: CPT

## 2020-12-08 PROCEDURE — 82043 UR ALBUMIN QUANTITATIVE: CPT

## 2020-12-08 PROCEDURE — 85025 COMPLETE CBC W/AUTO DIFF WBC: CPT

## 2020-12-08 PROCEDURE — 84443 ASSAY THYROID STIM HORMONE: CPT

## 2020-12-08 PROCEDURE — 81001 URINALYSIS AUTO W/SCOPE: CPT

## 2020-12-17 ENCOUNTER — TELEMEDICINE (OUTPATIENT)
Dept: FAMILY MEDICINE CLINIC | Facility: CLINIC | Age: 80
End: 2020-12-17
Payer: MEDICARE

## 2020-12-17 VITALS — DIASTOLIC BLOOD PRESSURE: 72 MMHG | SYSTOLIC BLOOD PRESSURE: 140 MMHG

## 2020-12-17 DIAGNOSIS — E03.9 HYPOTHYROIDISM, UNSPECIFIED TYPE: Chronic | ICD-10-CM

## 2020-12-17 DIAGNOSIS — R73.03 PREDIABETES: Primary | ICD-10-CM

## 2020-12-17 DIAGNOSIS — E78.5 HYPERLIPIDEMIA, UNSPECIFIED HYPERLIPIDEMIA TYPE: ICD-10-CM

## 2020-12-17 DIAGNOSIS — I10 BENIGN ESSENTIAL HYPERTENSION: ICD-10-CM

## 2020-12-17 PROBLEM — M54.16 RADICULOPATHY OF LUMBAR REGION: Status: RESOLVED | Noted: 2018-10-02 | Resolved: 2020-12-17

## 2020-12-17 PROBLEM — I87.2 CHRONIC VENOUS INSUFFICIENCY: Status: RESOLVED | Noted: 2017-06-02 | Resolved: 2020-12-17

## 2020-12-17 PROBLEM — IMO0001 TRANSITION OF CARE PERFORMED WITH SHARING OF CLINICAL SUMMARY: Status: RESOLVED | Noted: 2020-03-13 | Resolved: 2020-12-17

## 2020-12-17 PROBLEM — M79.672 PAIN IN BOTH FEET: Status: RESOLVED | Noted: 2018-03-15 | Resolved: 2020-12-17

## 2020-12-17 PROBLEM — B02.9 HERPES ZOSTER WITHOUT COMPLICATION: Status: RESOLVED | Noted: 2020-11-03 | Resolved: 2020-12-17

## 2020-12-17 PROBLEM — L84 CORNS: Status: RESOLVED | Noted: 2018-10-02 | Resolved: 2020-12-17

## 2020-12-17 PROBLEM — Z48.811 ENCOUNTER FOR SURGICAL AFTERCARE FOLLOWING SURGERY OF NERVOUS SYSTEM: Status: RESOLVED | Noted: 2020-05-19 | Resolved: 2020-12-17

## 2020-12-17 PROBLEM — I70.209 PERIPHERAL ARTERIOSCLEROSIS (HCC): Status: RESOLVED | Noted: 2019-06-21 | Resolved: 2020-12-17

## 2020-12-17 PROBLEM — M79.671 PAIN IN BOTH FEET: Status: RESOLVED | Noted: 2018-03-15 | Resolved: 2020-12-17

## 2020-12-17 PROBLEM — R52 PAIN: Status: RESOLVED | Noted: 2020-04-09 | Resolved: 2020-12-17

## 2020-12-17 PROBLEM — Z00.00 MEDICARE ANNUAL WELLNESS VISIT, SUBSEQUENT: Status: RESOLVED | Noted: 2019-03-08 | Resolved: 2020-12-17

## 2020-12-17 PROBLEM — G89.29 CHRONIC LOW BACK PAIN: Status: RESOLVED | Noted: 2017-10-24 | Resolved: 2020-12-17

## 2020-12-17 PROBLEM — L30.9 ECZEMA: Status: RESOLVED | Noted: 2018-12-03 | Resolved: 2020-12-17

## 2020-12-17 PROBLEM — M54.41 ACUTE RIGHT-SIDED LOW BACK PAIN WITH RIGHT-SIDED SCIATICA: Status: RESOLVED | Noted: 2020-10-16 | Resolved: 2020-12-17

## 2020-12-17 PROBLEM — E78.2 ELEVATED TRIGLYCERIDES WITH HIGH CHOLESTEROL: Status: RESOLVED | Noted: 2017-06-02 | Resolved: 2020-12-17

## 2020-12-17 PROBLEM — R06.02 SOB (SHORTNESS OF BREATH): Status: RESOLVED | Noted: 2018-10-26 | Resolved: 2020-12-17

## 2020-12-17 PROBLEM — M51.16 LUMBAR DISC HERNIATION WITH RADICULOPATHY: Status: RESOLVED | Noted: 2017-12-08 | Resolved: 2020-12-17

## 2020-12-17 PROBLEM — Z78.9 TRANSITION OF CARE PERFORMED WITH SHARING OF CLINICAL SUMMARY: Status: RESOLVED | Noted: 2020-03-13 | Resolved: 2020-12-17

## 2020-12-17 PROBLEM — M54.50 CHRONIC LOW BACK PAIN: Status: RESOLVED | Noted: 2017-10-24 | Resolved: 2020-12-17

## 2020-12-17 PROCEDURE — 99214 OFFICE O/P EST MOD 30 MIN: CPT | Performed by: FAMILY MEDICINE

## 2020-12-17 RX ORDER — AMLODIPINE BESYLATE 10 MG/1
10 TABLET ORAL DAILY
Qty: 90 TABLET | Refills: 1 | Status: SHIPPED | OUTPATIENT
Start: 2020-12-17 | End: 2021-07-01 | Stop reason: SDUPTHER

## 2020-12-17 RX ORDER — LEVOTHYROXINE SODIUM 88 UG/1
88 TABLET ORAL DAILY
Qty: 90 TABLET | Refills: 1 | Status: SHIPPED | OUTPATIENT
Start: 2020-12-17 | End: 2021-07-01 | Stop reason: SDUPTHER

## 2020-12-17 RX ORDER — ATORVASTATIN CALCIUM 40 MG/1
40 TABLET, FILM COATED ORAL EVERY EVENING
Qty: 90 TABLET | Refills: 1 | Status: SHIPPED | OUTPATIENT
Start: 2020-12-17 | End: 2021-06-02

## 2020-12-29 ENCOUNTER — TELEPHONE (OUTPATIENT)
Dept: NEPHROLOGY | Facility: CLINIC | Age: 80
End: 2020-12-29

## 2020-12-29 ENCOUNTER — OFFICE VISIT (OUTPATIENT)
Dept: NEPHROLOGY | Facility: CLINIC | Age: 80
End: 2020-12-29
Payer: MEDICARE

## 2020-12-29 VITALS
BODY MASS INDEX: 33.04 KG/M2 | SYSTOLIC BLOOD PRESSURE: 138 MMHG | WEIGHT: 175 LBS | HEIGHT: 61 IN | DIASTOLIC BLOOD PRESSURE: 62 MMHG

## 2020-12-29 DIAGNOSIS — N18.32 STAGE 3B CHRONIC KIDNEY DISEASE (HCC): ICD-10-CM

## 2020-12-29 DIAGNOSIS — R45.89 DEPRESSED MOOD: Primary | ICD-10-CM

## 2020-12-29 PROCEDURE — 99213 OFFICE O/P EST LOW 20 MIN: CPT | Performed by: INTERNAL MEDICINE

## 2020-12-29 RX ORDER — DULOXETIN HYDROCHLORIDE 20 MG/1
20 CAPSULE, DELAYED RELEASE ORAL DAILY
Qty: 30 CAPSULE | Refills: 3
Start: 2020-12-29 | End: 2021-01-18 | Stop reason: SDUPTHER

## 2021-01-04 ENCOUNTER — TELEPHONE (OUTPATIENT)
Dept: NEPHROLOGY | Facility: CLINIC | Age: 81
End: 2021-01-04

## 2021-01-04 NOTE — TELEPHONE ENCOUNTER
Patient called the office with the following blood pressure reading as requested    December30- 137/63 86p  December31- 128/64 85p  Jan1- 154/62 79p  Jan2-125/64m 79p  jan3-150/65 80p  jan4- 129/59 81p

## 2021-01-09 NOTE — TELEPHONE ENCOUNTER
Hello    Overall BP appears stable and at goal but had two times 150    Can pt keep BP check for once daily for 2 weeks and send in log again    No changes for now    Thank you    np

## 2021-01-16 DIAGNOSIS — R45.89 DEPRESSED MOOD: ICD-10-CM

## 2021-01-16 DIAGNOSIS — B02.9 HERPES ZOSTER WITHOUT COMPLICATION: ICD-10-CM

## 2021-01-17 DIAGNOSIS — B02.9 HERPES ZOSTER WITHOUT COMPLICATION: ICD-10-CM

## 2021-01-17 DIAGNOSIS — R45.89 DEPRESSED MOOD: ICD-10-CM

## 2021-01-17 RX ORDER — GABAPENTIN 100 MG/1
100 CAPSULE ORAL 3 TIMES DAILY
Qty: 30 CAPSULE | Refills: 0 | Status: CANCELLED | OUTPATIENT
Start: 2021-01-17

## 2021-01-18 ENCOUNTER — TELEPHONE (OUTPATIENT)
Dept: FAMILY MEDICINE CLINIC | Facility: CLINIC | Age: 81
End: 2021-01-18

## 2021-01-18 DIAGNOSIS — R45.89 DEPRESSED MOOD: ICD-10-CM

## 2021-01-18 DIAGNOSIS — R11.0 NAUSEA: Primary | ICD-10-CM

## 2021-01-18 DIAGNOSIS — B02.9 HERPES ZOSTER WITHOUT COMPLICATION: ICD-10-CM

## 2021-01-18 RX ORDER — GABAPENTIN 100 MG/1
100 CAPSULE ORAL 3 TIMES DAILY
Qty: 270 CAPSULE | Refills: 0 | Status: SHIPPED | OUTPATIENT
Start: 2021-01-18 | End: 2021-01-18 | Stop reason: SDUPTHER

## 2021-01-18 RX ORDER — ONDANSETRON 4 MG/1
4 TABLET, FILM COATED ORAL EVERY 8 HOURS PRN
Qty: 20 TABLET | Refills: 0 | Status: SHIPPED | OUTPATIENT
Start: 2021-01-18 | End: 2021-03-25 | Stop reason: HOSPADM

## 2021-01-18 RX ORDER — DULOXETIN HYDROCHLORIDE 20 MG/1
20 CAPSULE, DELAYED RELEASE ORAL DAILY
Qty: 30 CAPSULE | Refills: 0 | Status: SHIPPED | OUTPATIENT
Start: 2021-01-18 | End: 2021-02-16 | Stop reason: SDUPTHER

## 2021-01-18 RX ORDER — GABAPENTIN 100 MG/1
100 CAPSULE ORAL 3 TIMES DAILY
Qty: 90 CAPSULE | Refills: 0 | Status: SHIPPED | OUTPATIENT
Start: 2021-01-18 | End: 2021-04-28 | Stop reason: SDUPTHER

## 2021-01-18 RX ORDER — DULOXETIN HYDROCHLORIDE 20 MG/1
20 CAPSULE, DELAYED RELEASE ORAL DAILY
Qty: 90 CAPSULE | Refills: 1 | Status: SHIPPED | OUTPATIENT
Start: 2021-01-18 | End: 2021-01-18 | Stop reason: SDUPTHER

## 2021-01-18 NOTE — TELEPHONE ENCOUNTER
Zofran called in  If she develops fever she will need to be tested for covid    She should go to the ER if any of these symptoms persist or worsen

## 2021-01-18 NOTE — TELEPHONE ENCOUNTER
Patients daughter in law advised me patient has not taken her Cymbalta for a week now and has a headache  I did advise her that Dr Yeni Quevedo apparently thought he refilled this however it was set to no print  Your name is on the last bottle  Are you filling or is Dr Yeni Quevedo    Either way patient needs to mail order and 30 days to local

## 2021-01-18 NOTE — TELEPHONE ENCOUNTER
Patient called to advise that she is not feeling well is nauseous and has a headache  Again, patient has not had her Cymbalta for 1 week now  Patient was concerned that about having another stroke as these were symptoms she had when she had a stroke  I advised her that if that was concern she should go to the ER

## 2021-01-19 RX ORDER — DULOXETIN HYDROCHLORIDE 20 MG/1
20 CAPSULE, DELAYED RELEASE ORAL DAILY
Qty: 30 CAPSULE | Refills: 0
Start: 2021-01-19

## 2021-01-20 DIAGNOSIS — Z23 ENCOUNTER FOR IMMUNIZATION: ICD-10-CM

## 2021-01-31 ENCOUNTER — IMMUNIZATIONS (OUTPATIENT)
Dept: FAMILY MEDICINE CLINIC | Facility: HOSPITAL | Age: 81
End: 2021-01-31

## 2021-01-31 DIAGNOSIS — Z23 ENCOUNTER FOR IMMUNIZATION: Primary | ICD-10-CM

## 2021-01-31 PROCEDURE — 91300 SARS-COV-2 / COVID-19 MRNA VACCINE (PFIZER-BIONTECH) 30 MCG: CPT

## 2021-01-31 PROCEDURE — 0001A SARS-COV-2 / COVID-19 MRNA VACCINE (PFIZER-BIONTECH) 30 MCG: CPT

## 2021-02-03 NOTE — PROGRESS NOTES
Patient has not contacted clinic in over 30 days to schedule ST sessions, patient will be discharged and will require new Rx to resume services

## 2021-02-14 DIAGNOSIS — R45.89 DEPRESSED MOOD: ICD-10-CM

## 2021-02-14 DIAGNOSIS — B02.9 HERPES ZOSTER WITHOUT COMPLICATION: ICD-10-CM

## 2021-02-15 DIAGNOSIS — K21.9 GASTROESOPHAGEAL REFLUX DISEASE WITHOUT ESOPHAGITIS: ICD-10-CM

## 2021-02-15 DIAGNOSIS — I10 BENIGN ESSENTIAL HTN: ICD-10-CM

## 2021-02-15 RX ORDER — OMEPRAZOLE 20 MG/1
20 CAPSULE, DELAYED RELEASE ORAL EVERY MORNING
Qty: 90 CAPSULE | Refills: 1 | Status: SHIPPED | OUTPATIENT
Start: 2021-02-15 | End: 2021-07-01 | Stop reason: SDUPTHER

## 2021-02-15 RX ORDER — GABAPENTIN 100 MG/1
CAPSULE ORAL
Qty: 90 CAPSULE | Refills: 0 | OUTPATIENT
Start: 2021-02-15

## 2021-02-15 RX ORDER — DULOXETIN HYDROCHLORIDE 20 MG/1
CAPSULE, DELAYED RELEASE ORAL
Qty: 30 CAPSULE | Refills: 0 | OUTPATIENT
Start: 2021-02-15

## 2021-02-15 RX ORDER — LOSARTAN POTASSIUM 25 MG/1
TABLET ORAL
Qty: 180 TABLET | Refills: 3 | Status: SHIPPED | OUTPATIENT
Start: 2021-02-15 | End: 2021-07-15 | Stop reason: SDUPTHER

## 2021-02-16 DIAGNOSIS — R45.89 DEPRESSED MOOD: ICD-10-CM

## 2021-02-16 RX ORDER — DULOXETIN HYDROCHLORIDE 20 MG/1
20 CAPSULE, DELAYED RELEASE ORAL DAILY
Qty: 30 CAPSULE | Refills: 5 | Status: SHIPPED | OUTPATIENT
Start: 2021-02-16 | End: 2021-05-07

## 2021-02-20 ENCOUNTER — IMMUNIZATIONS (OUTPATIENT)
Dept: FAMILY MEDICINE CLINIC | Facility: HOSPITAL | Age: 81
End: 2021-02-20

## 2021-02-20 DIAGNOSIS — Z23 ENCOUNTER FOR IMMUNIZATION: Primary | ICD-10-CM

## 2021-02-20 PROCEDURE — 0002A SARS-COV-2 / COVID-19 MRNA VACCINE (PFIZER-BIONTECH) 30 MCG: CPT

## 2021-02-20 PROCEDURE — 91300 SARS-COV-2 / COVID-19 MRNA VACCINE (PFIZER-BIONTECH) 30 MCG: CPT

## 2021-02-24 ENCOUNTER — OFFICE VISIT (OUTPATIENT)
Dept: CARDIOLOGY CLINIC | Facility: CLINIC | Age: 81
End: 2021-02-24
Payer: MEDICARE

## 2021-02-24 ENCOUNTER — TELEPHONE (OUTPATIENT)
Dept: CARDIOLOGY CLINIC | Facility: CLINIC | Age: 81
End: 2021-02-24

## 2021-02-24 VITALS
WEIGHT: 178 LBS | SYSTOLIC BLOOD PRESSURE: 114 MMHG | OXYGEN SATURATION: 99 % | BODY MASS INDEX: 33.61 KG/M2 | TEMPERATURE: 97.8 F | HEIGHT: 61 IN | DIASTOLIC BLOOD PRESSURE: 60 MMHG | HEART RATE: 68 BPM

## 2021-02-24 DIAGNOSIS — R60.0 BILATERAL LEG EDEMA: ICD-10-CM

## 2021-02-24 DIAGNOSIS — I49.1 PAC (PREMATURE ATRIAL CONTRACTION): ICD-10-CM

## 2021-02-24 DIAGNOSIS — R06.00 EXERTIONAL DYSPNEA: ICD-10-CM

## 2021-02-24 DIAGNOSIS — I10 BENIGN ESSENTIAL HYPERTENSION: ICD-10-CM

## 2021-02-24 DIAGNOSIS — I34.0 MODERATE TO SEVERE MITRAL REGURGITATION: ICD-10-CM

## 2021-02-24 DIAGNOSIS — I50.32 CHRONIC DIASTOLIC (CONGESTIVE) HEART FAILURE (HCC): Primary | ICD-10-CM

## 2021-02-24 PROCEDURE — 93000 ELECTROCARDIOGRAM COMPLETE: CPT | Performed by: INTERNAL MEDICINE

## 2021-02-24 PROCEDURE — 99214 OFFICE O/P EST MOD 30 MIN: CPT | Performed by: INTERNAL MEDICINE

## 2021-02-24 NOTE — PROGRESS NOTES
Cardiology Follow Up  Partha Garcia [facility]  1940  [id number]  [place] AND [facility] [address]  [facility] [place] [facility] [place] [facility] [id number]    1  Chronic diastolic (congestive) heart failure (HCC)  POCT ECG    AMB extended holter monitor   2  Benign essential hypertension  POCT ECG   3  Bilateral leg edema  POCT ECG    AMB extended holter monitor   4  Moderate to severe mitral regurgitation  POCT ECG    AMB extended holter monitor   5  Exertional dyspnea  POCT ECG    AMB extended holter monitor   6  PAC (premature atrial contraction)  AMB extended holter monitor      Discussion/Plan:  Frequent PAC/irregular heart rhythm- we will place her on a 2 week heart monitor to rule out atrial fibrillation  She has a history of sleep apnea  Given her age and comorbidities we need to rule out for better risk stratification  Intraparenchymal bleed/cranioplasty- healed well    Moderate to severe mitral regurgitation- optimize blood pressure  Continue amlodipine 10 mg  Losartan 50 mg morning and 25mgh in evening  Bumex 2 mg daily    Acute on chronic diastolic heart failure with a component of lymphedema- improved volume status  Lower ext better  - Weight loss  - Compression socks + wedge pillow  - Increase bumex 2mg daily +  - Low-salt diet   - Elevation of legs + walking  -- if with continued lower extremity edema consideration for compression boots    Chronic dyspnea-I believe this is multifactorial including chronic deconditioning from severe back pain  BMI of 41  Some retained lower extremity edema  Her PFTs were reviewed which also show some restriction    She needs to started structured exercise program   Possible swimming or water sports given her severe back discomfort     Sleep apnea  -- cpap    Incomplete rbbb    Acute on chronic kidney disease follow-up with renal  - continue cozaar +  - Bumex 2mg daily    Triglycerides- controlled  - 4000 mg omega-3 daily    WENDI- on cpap      Interval History:  She denies having chest pain  Her edema is better  Taking omega 3 currently  No bleeding or bruising  No dizziness or light-headness  Blood pressure very well controlled  Has back pain  12/11/2018:  She reports having some exertional shortness of breath  Her lower extremity edema has improved but is persistent  She denies feeling dizziness or lightheadedness  She denies having any falls  She denies having any bleeding or bruising     06/12/2019:  She reports some improvement in her shortness of breath but still has dyspnea while sitting  She is unable to walk secondary to severe back pain  She denies feeling dizziness or lightheadedness  Her lower extremity edema has improved  She is trying a elevator legs  She does wear compression socks  She is compliant with CPAP     31/20: [de-identified]year-old with recent intraparenchymal hemorrhage status post right decompressive craniectomy and evacuation of intraparenchymal hematoma  She is pending follow-up bone flap by neuro surgery  She tolerated her previous procedure without evidence of decompensated heart failure  She was noted to have some bradycardia and her beta-blocker was discontinued  She is compliant with her antihypertensive medications  She is compliant with her diuretics  She denies having significant worsening in her chronic dyspnea or lower extremity edema    02/24/2021:  She denies currently feeling significant shortness of breath  She reports her lower extremity swelling is well controlled  We reviewed through her recent EKG  No prior history of atrial fibrillation  She is compliant with her medications  Denies having any falls      Patient Active Problem List   Diagnosis    Benign essential HTN    Chronic diastolic (congestive) heart failure (HCC)    Hypothyroidism    Acute on chronic diastolic congestive heart failure (HCC)    Arthropathy of knee    Hallux abductovalgus with bunions, unspecified laterality    CKD (chronic kidney disease), stage III    Diverticulitis of colon    Chronic gout without tophus    Hiatal hernia    Hyperlipemia    Hyperuricemia    Morbid obesity with body mass index of 40 0-44 9 in adult (HCC)    Nephrolithiasis    Onychomycosis    WENDI (obstructive sleep apnea)    Umbilical hernia    Rupture of popliteal cyst    Pseudogout of left wrist    Alkaline phosphatase elevation    Gastroesophageal reflux disease without esophagitis    Iron deficiency anemia secondary to inadequate dietary iron intake    Intraparenchymal hemorrhage of brain (HCC)    Prediabetes    Status post right frontotemporal replacement cranioplasty    BMI 38 0-38 9,adult    Need for vaccination     Past Medical History:   Diagnosis Date    Anxiety     Arthritis     joints    Cataract     Disease of thyroid gland     Eczema     GERD (gastroesophageal reflux disease)     Gout     Heart failure (HCC)     Hepatitis     Hiatal hernia     Hypertension     Onychomycosis     last assessed: 4/29/16    Orthopnea     resolved: 1/8/16    Pseudogout of left wrist     Sleep apnea     wears CPAP    Stroke [facility])      Social History     Socioeconomic History    Marital status:      Spouse name: Not on file    Number of children: Not on file    Years of education: Not on file    Highest education level: Not on file   Occupational History    Not on file   Social Needs    Financial resource strain: Not on file    Food insecurity     Worry: Not on file     Inability: Not on file    Transportation needs     Medical: No     Non-medical: No   Tobacco Use    Smoking status: Never Smoker    Smokeless tobacco: Never Used   Substance and Sexual Activity    Alcohol use: Yes     Alcohol/week: 0 0 standard drinks     Frequency: Monthly or less     Drinks per session: 1 or 2     Binge frequency: Never     Comment: Rare      Drug use: Never    Sexual activity: Not Currently     Birth control/protection: Post-menopausal   Lifestyle    Physical activity     Days per week: Not on file     Minutes per session: Not on file    Stress: Not on file   Relationships    Social connections     Talks on phone: Not on file     Gets together: Not on file     Attends [employer] service: Not on file     Active member of club or organization: Not on file     Attends meetings of clubs or organizations: Not on file     Relationship status: Not on file    Intimate partner violence     Fear of current or ex partner: Not on file     Emotionally abused: Not on file     Physically abused: Not on file     Forced sexual activity: Not on file   Other Topics Concern    Not on file   Social History Narrative    Daily coffee consumption    Lack of exercise    Sleeps 6-7 hours a day      Family History   Problem Relation Age of Onset    Diabetes Mother     Coronary artery disease Mother     Arthritis Mother     Hypertension Mother     Hypertension Father     Diabetes Brother     Diabetes Son     Arthritis Family      Past Surgical History:   Procedure Laterality Date    ABDOMINAL SURGERY          BRAIN HEMATOMA EVACUATION Right 2020    Procedure: Right decompressive craniectomy and evacuation of intraparenchymal hematoma; Surgeon: Meryle Pluck, MD;  [facility];  Service: Neurosurgery    BREAST SURGERY Right     cyst removal    CARPAL TUNNEL RELEASE Left     CARPAL TUNNEL RELEASE Right     CATARACT EXTRACTION       SECTION  1960    x1    COLONOSCOPY N/A 2018    Procedure: COLONOSCOPY;  Surgeon: Juvenal Barrientos MD;  [facility];   Service: Gastroenterology    DILATION AND CURETTAGE OF UTERUS  1185    EYE SURGERY Left     cataracts    HERNIA REPAIR      umbilical hernia    INCISIONAL HERNIA REPAIR      incarcerated    KNEE ARTHROSCOPY Right     DE REPLACE SKULL PLATE/FLAP Right     Procedure: right frontotemporal replacement cranioplasty;  Surgeon: Thai Najera MD;  Location:  MAIN OR;  Service: Neurosurgery    UT XCAPSL CTRC RMVL INSJ IO LENS PROSTH W/O ECP Right 3/27/2017    Procedure: EXTRACTION EXTRACAPSULAR CATARACT PHACO INTRAOCULAR LENS (IOL); Surgeon: Alejandrina Hernandez MD;  Location: [facility] MAIN OR;  Service: Ophthalmology       Current Outpatient Medications:     amLODIPine (NORVASC) 10 mg tablet, Take 1 tablet (10 mg total) by mouth daily, Disp: 90 tablet, Rfl: 1    atorvastatin (LIPITOR) 40 mg tablet, Take 1 tablet (40 mg total) by mouth every evening, Disp: 90 tablet, Rfl: 1    bumetanide (BUMEX) 2 mg tablet, Take 1 tablet (2 mg total) by mouth daily, Disp: 135 tablet, Rfl: 3    DULoxetine (CYMBALTA) 20 mg capsule, Take 1 capsule (20 mg total) by mouth daily, Disp: 30 capsule, Rfl: 5    gabapentin (NEURONTIN) 100 mg capsule, Take 1 capsule (100 mg total) by mouth 3 (three) times a day, Disp: 90 capsule, Rfl: 0    levothyroxine 88 mcg tablet, Take 1 tablet (88 mcg total) by mouth daily, Disp: 90 tablet, Rfl: 1    losartan (COZAAR) 25 mg tablet, Take 2 tab in AM and 1 tab in PM, Disp: 180 tablet, Rfl: 3    omeprazole (PriLOSEC) 20 mg delayed release capsule, Take 1 capsule (20 mg total) by mouth every morning, Disp: 90 capsule, Rfl: 1    spironolactone (ALDACTONE) 25 mg tablet, Take 0 5 tablets (12 5 mg total) by mouth daily, Disp: 45 tablet, Rfl: 3    ondansetron (ZOFRAN) 4 mg tablet, Take 1 tablet (4 mg total) by mouth every 8 (eight) hours as needed for nausea or vomiting (Patient not taking: Reported on 2/24/2021), Disp: 20 tablet, Rfl: 0  No Known Allergies    Review of Systems:  Review of Systems   Constitutional: Negative  Negative for activity change, appetite change, chills, diaphoresis, fatigue, fever and unexpected weight change  HENT: Negative    Negative for congestion, dental problem, drooling, ear discharge, ear pain, facial swelling, hearing loss, mouth sores, nosebleeds, postnasal drip, rhinorrhea, sinus pressure, sinus pain, sneezing, sore throat, tinnitus, trouble swallowing and voice change  Eyes: Negative  Negative for photophobia, pain, redness, itching and visual disturbance  Respiratory: Negative  Negative for apnea, cough, choking, chest tightness, shortness of breath, wheezing and stridor  Cardiovascular: Positive for leg swelling  Negative for chest pain and palpitations  Gastrointestinal: Negative  Negative for abdominal distention, abdominal pain, anal bleeding, blood in stool, constipation, diarrhea, nausea, rectal pain and vomiting  Endocrine: Negative  Negative for cold intolerance, heat intolerance, polydipsia, polyphagia and polyuria  Genitourinary: Negative  Negative for decreased urine volume, difficulty urinating, dyspareunia, dysuria, enuresis, flank pain, frequency, genital sores, hematuria, menstrual problem, pelvic pain, urgency, vaginal bleeding, vaginal discharge and vaginal pain  Musculoskeletal: Positive for back pain  Negative for arthralgias, gait problem, joint swelling, myalgias, neck pain and neck stiffness  Skin: Negative  Negative for color change, pallor, rash and wound  Allergic/Immunologic: Negative  Negative for environmental allergies, food allergies and immunocompromised state  Neurological: Negative  Negative for dizziness, tremors, seizures, syncope, facial asymmetry, speech difficulty, weakness, light-headedness, numbness and headaches  Hematological: Negative  Negative for adenopathy  Does not bruise/bleed easily  Psychiatric/Behavioral: Negative  Negative for agitation, behavioral problems, confusion, decreased concentration, dysphoric mood, hallucinations, self-injury, sleep disturbance and suicidal ideas  The patient is not nervous/anxious and is not hyperactive  All other systems reviewed and are negative        Vitals:    02/24/21 1035   BP: 114/60   BP Location: Right arm   Patient Position: Sitting   Cuff Size: Standard   Pulse: 68   Temp: 97 8 °F (36 6 °C)   SpO2: 99%   Weight: 80 7 kg (178 lb)   Height: 5' 1" (1 549 m)     Physical Exam:  Physical Exam   Constitutional: She is oriented to person, place, and time  She appears well-developed and well-nourished  No distress  HENT:   Head: Normocephalic and atraumatic  Right Ear: External ear normal    Left Ear: External ear normal    Eyes: Pupils are equal, round, and reactive to light  Conjunctivae are normal  Right eye exhibits no discharge  Left eye exhibits no discharge  No scleral icterus  Neck: Normal range of motion  Neck supple  No JVD present  No tracheal deviation present  No thyromegaly present  Cardiovascular: Normal rate and regular rhythm  Exam reveals gallop  Exam reveals no friction rub  No murmur heard  Pulmonary/Chest: Effort normal and breath sounds normal  No stridor  No respiratory distress  She has no wheezes  She has no rales  She exhibits no tenderness  Abdominal: Soft  Bowel sounds are normal  She exhibits no distension and no mass  There is no abdominal tenderness  There is no rebound and no guarding  Musculoskeletal: Normal range of motion  General: Edema present  No tenderness or deformity  Neurological: She is alert and oriented to person, place, and time  She has normal reflexes  No cranial nerve deficit  She exhibits normal muscle tone  Coordination normal    Skin: Skin is warm and dry  No rash noted  She is not diaphoretic  No erythema  No pallor  Psychiatric: She has a normal mood and affect  Her behavior is normal  Judgment and thought content normal    Nursing note and vitals reviewed        Labs:     Lab Results   Component Value Date    WBC 5 72 12/08/2020    HGB 12 2 12/08/2020    HCT 38 7 12/08/2020    MCV 89 12/08/2020     12/08/2020     Lab Results   Component Value Date    K 4 4 12/08/2020     12/08/2020    CO2 31 12/08/2020    BUN 26 (H) 12/08/2020    CREATININE 1 13 2020    GLUCOSE 124 2020    GLUF 107 (H) 2020    CALCIUM 9 8 2020    AST 12 2020    ALT 18 2020    ALKPHOS 141 (H) 2020    EGFR 46 2020     Lab Results   Component Value Date    CHOL 191 2013    CHOL 218 2013     Lab Results   Component Value Date    HDL 54 2020    HDL 50 2020    HDL 45 2020     Lab Results   Component Value Date    LDLCALC 45 2020    LDLCALC 48 2020    LDLCALC 103 (H) 2020     Lab Results   Component Value Date    TRIG 79 2020    TRIG 114 2020    TRIG 146 2020     Lab Results   Component Value Date    HGBA1C 5 7 (H) 2020       Imaging & Testing   I have personally reviewed pertinent reports  EKG: Personally reviewed  Normal sinus rhythm no acute st/t wave    Cardiac testing:   Results for orders placed during the hospital encounter of 01/15/16   Echo complete with contrast if indicated    Narrative [address]  [address] [facility]  [phone]    Transthoracic Echocardiogram  2D, M-mode, Doppler, and Color Doppler    Study date:  15-Berny-2016    Patient: Wally Marc  MR number: [id number]  Account number: [de-identified]  : 1940  Age: 67 years  Gender: Female  Status: Routine  Location: Echo lab  Height: 61 in  Weight: 214 5 lb  BP: 132/ 84 mmHg    Indications: HTN    Diagnoses: 401 9 - HYPERTENSION NOS    Sonographer:  Edgardo Villegas  Primary Physician:  Dior Hernandez  Referring Physician:  Isa Carcamo:  Cheri Pleitez  Interpreting Physician:  Masha Harris, DO    SUMMARY    LEFT VENTRICLE:  Systolic function was at the lower limits of normal  Ejection fraction was  estimated in the range of 50 % to 55 %  There were no regional wall motion abnormalities  There was moderate concentric hypertrophy    Features were consistent with a pseudonormal left ventricular filling pattern,  with concomitant abnormal relaxation and increased filling pressure (grade 2  diastolic dysfunction)  Doppler parameters were consistent with elevated mean  left atrial filling pressure  LEFT ATRIUM:  The atrium was moderately dilated  RIGHT ATRIUM:  The atrium was markedly dilated  MITRAL VALVE:  There was marked annular calcification  There was moderate regurgitation  TRICUSPID VALVE:  There was mild regurgitation  Pulmonary artery systolic pressure was mildly increased  Estimated peak PA pressure was 46 mmHg  HISTORY: PRIOR HISTORY: Patient has no history of cardiovascular disease  PROCEDURE: The procedure was performed in the echo lab  This was a routine  study  The transthoracic approach was used  The study included complete 2D  imaging, M-mode, complete spectral Doppler, and color Doppler  The heart rate  was 77 bpm, at the start of the study  Echocardiographic views were limited due  to poor acoustic window availability and decreased penetration  This was a  technically difficult study  LEFT VENTRICLE: Size was normal  Systolic function was at the lower limits of  normal  Ejection fraction was estimated in the range of 50 % to 55 %  There  were no regional wall motion abnormalities  There was moderate concentric  hypertrophy  DOPPLER: Features were consistent with a pseudonormal left  ventricular filling pattern, with concomitant abnormal relaxation and increased  filling pressure (grade 2 diastolic dysfunction)  Doppler parameters were  consistent with elevated mean left atrial filling pressure  RIGHT VENTRICLE: The size was normal  Systolic function was normal  DOPPLER:  Systolic pressure was within the normal range  LEFT ATRIUM: The atrium was moderately dilated  No thrombus was identified  RIGHT ATRIUM: The atrium was markedly dilated  MITRAL VALVE: There was marked annular calcification  Valve structure was  normal  There was normal leaflet separation   No echocardiographic evidence for  prolapse  DOPPLER: The transmitral velocity was within the normal range  There  was no evidence for stenosis  There was moderate regurgitation  AORTIC VALVE: The valve was trileaflet  Leaflets exhibited normal thickness,  normal cuspal separation, and sclerosis  DOPPLER: Transaortic velocity was  within the normal range  There was no evidence for stenosis  There was no  regurgitation  TRICUSPID VALVE: The valve structure was normal  There was normal leaflet  separation  DOPPLER: The transtricuspid velocity was within the normal range  There was mild regurgitation  Pulmonary artery systolic pressure was mildly  increased  Estimated peak PA pressure was 46 mmHg  PULMONIC VALVE: Leaflets exhibited normal thickness, no calcification, and  normal cuspal separation  DOPPLER: The transpulmonic velocity was within the  normal range  There was mild regurgitation  PERICARDIUM: There was no thickening  There was no pericardial effusion  AORTA: The root exhibited normal size  PULMONARY ARTERY: The size was normal  The morphology appeared normal     SYSTEM MEASUREMENT TABLES    2D mode  AoR Diam 2D: 2 8 cm  LA Diam (2D): 5 3 cm  LA/Ao (2D): 1 89  FS (2D Teich): 25 3 %  IVSd (2D): 1 44 cm  LVDEV: 98 3 cm³  LVESV: 49 1 cm³  LVIDd(2D): 4 62 cm  LVISd (2D): 3 45 cm  LVPWd (2D): 1 3 cm  SV (Teich): 49 2 cm³    Apical four chamber  LVEF A4C: 55 %    Unspecified Scan Mode  MV Peak A Jesse: 1110 mm/s  MV Peak E Jesse   Mean: 1400 mm/s  MVA (PHT): 3 55 cm squared  PHT: 58 ms  Max P mm[Hg]  V Max: 2990 mm/s  Vmax: 3050 mm/s  RA Area: 19 6 cm squared  RA Volume: 55 3 cm³  TAPSE: 1 9 cm    [employer] Accredited Echocardiography Laboratory    Prepared and electronically signed by    Marysol Barboza DO  Signed 2016 17:37:47       EKG sinus rhythm with frequent apc qtc [id number] [employer] MD Alaniz  Please call with any questions or suggestions    A description of the counseling:   Goals and Barriers:  Patient's ability to self care:  Medication side effect reviewed with patient in detail and all their questions answered  "This note has been constructed using a voice recognition system  Therefore there may be syntax, spelling, and/or grammatical errors   Please call if you have any questions  "

## 2021-03-22 ENCOUNTER — LAB (OUTPATIENT)
Dept: LAB | Facility: CLINIC | Age: 81
End: 2021-03-22
Payer: MEDICARE

## 2021-03-22 ENCOUNTER — TRANSCRIBE ORDERS (OUTPATIENT)
Dept: LAB | Facility: CLINIC | Age: 81
End: 2021-03-22

## 2021-03-22 DIAGNOSIS — N18.32 STAGE 3B CHRONIC KIDNEY DISEASE (HCC): ICD-10-CM

## 2021-03-22 LAB
ANION GAP SERPL CALCULATED.3IONS-SCNC: 6 MMOL/L (ref 4–13)
BACTERIA UR QL AUTO: ABNORMAL /HPF
BILIRUB UR QL STRIP: NEGATIVE
BUN SERPL-MCNC: 35 MG/DL (ref 5–25)
CALCIUM SERPL-MCNC: 9.3 MG/DL (ref 8.3–10.1)
CHLORIDE SERPL-SCNC: 109 MMOL/L (ref 100–108)
CLARITY UR: CLEAR
CO2 SERPL-SCNC: 28 MMOL/L (ref 21–32)
COLOR UR: YELLOW
CREAT SERPL-MCNC: 0.99 MG/DL (ref 0.6–1.3)
CREAT UR-MCNC: 78.1 MG/DL
ERYTHROCYTE [DISTWIDTH] IN BLOOD BY AUTOMATED COUNT: 14.5 % (ref 11.6–15.1)
GFR SERPL CREATININE-BSD FRML MDRD: 54 ML/MIN/1.73SQ M
GLUCOSE P FAST SERPL-MCNC: 96 MG/DL (ref 65–99)
GLUCOSE UR STRIP-MCNC: NEGATIVE MG/DL
HCT VFR BLD AUTO: 35.3 % (ref 34.8–46.1)
HGB BLD-MCNC: 11.3 G/DL (ref 11.5–15.4)
HGB UR QL STRIP.AUTO: NEGATIVE
KETONES UR STRIP-MCNC: NEGATIVE MG/DL
LEUKOCYTE ESTERASE UR QL STRIP: ABNORMAL
MAGNESIUM SERPL-MCNC: 2.1 MG/DL (ref 1.6–2.6)
MCH RBC QN AUTO: 28.5 PG (ref 26.8–34.3)
MCHC RBC AUTO-ENTMCNC: 32 G/DL (ref 31.4–37.4)
MCV RBC AUTO: 89 FL (ref 82–98)
NITRITE UR QL STRIP: NEGATIVE
NON-SQ EPI CELLS URNS QL MICRO: ABNORMAL /HPF
PH UR STRIP.AUTO: 6 [PH]
PHOSPHATE SERPL-MCNC: 3.1 MG/DL (ref 2.3–4.1)
PLATELET # BLD AUTO: 164 THOUSANDS/UL (ref 149–390)
PMV BLD AUTO: 11.3 FL (ref 8.9–12.7)
POTASSIUM SERPL-SCNC: 4 MMOL/L (ref 3.5–5.3)
PROT UR STRIP-MCNC: NEGATIVE MG/DL
PROT UR-MCNC: 9 MG/DL
PROT/CREAT UR: 0.12 MG/G{CREAT} (ref 0–0.1)
RBC # BLD AUTO: 3.97 MILLION/UL (ref 3.81–5.12)
RBC #/AREA URNS AUTO: ABNORMAL /HPF
SODIUM SERPL-SCNC: 143 MMOL/L (ref 136–145)
SP GR UR STRIP.AUTO: 1.01 (ref 1–1.03)
UROBILINOGEN UR QL STRIP.AUTO: 0.2 E.U./DL
WBC # BLD AUTO: 6.06 THOUSAND/UL (ref 4.31–10.16)
WBC #/AREA URNS AUTO: ABNORMAL /HPF

## 2021-03-22 PROCEDURE — 84100 ASSAY OF PHOSPHORUS: CPT

## 2021-03-22 PROCEDURE — 85027 COMPLETE CBC AUTOMATED: CPT

## 2021-03-22 PROCEDURE — 82570 ASSAY OF URINE CREATININE: CPT

## 2021-03-22 PROCEDURE — 83735 ASSAY OF MAGNESIUM: CPT

## 2021-03-22 PROCEDURE — 81001 URINALYSIS AUTO W/SCOPE: CPT

## 2021-03-22 PROCEDURE — 80048 BASIC METABOLIC PNL TOTAL CA: CPT

## 2021-03-22 PROCEDURE — 84156 ASSAY OF PROTEIN URINE: CPT

## 2021-03-22 PROCEDURE — 36415 COLL VENOUS BLD VENIPUNCTURE: CPT

## 2021-03-24 ENCOUNTER — CLINICAL SUPPORT (OUTPATIENT)
Dept: CARDIOLOGY CLINIC | Facility: CLINIC | Age: 81
End: 2021-03-24
Payer: MEDICARE

## 2021-03-24 ENCOUNTER — TELEPHONE (OUTPATIENT)
Dept: FAMILY MEDICINE CLINIC | Facility: CLINIC | Age: 81
End: 2021-03-24

## 2021-03-24 ENCOUNTER — APPOINTMENT (EMERGENCY)
Dept: RADIOLOGY | Facility: HOSPITAL | Age: 81
End: 2021-03-24
Payer: MEDICARE

## 2021-03-24 ENCOUNTER — TELEPHONE (OUTPATIENT)
Dept: CARDIOLOGY CLINIC | Facility: CLINIC | Age: 81
End: 2021-03-24

## 2021-03-24 ENCOUNTER — HOSPITAL ENCOUNTER (OUTPATIENT)
Facility: HOSPITAL | Age: 81
Setting detail: OBSERVATION
Discharge: HOME | End: 2021-03-25
Attending: EMERGENCY MEDICINE | Admitting: INTERNAL MEDICINE
Payer: MEDICARE

## 2021-03-24 ENCOUNTER — APPOINTMENT (EMERGENCY)
Dept: CT IMAGING | Facility: HOSPITAL | Age: 81
End: 2021-03-24
Payer: MEDICARE

## 2021-03-24 DIAGNOSIS — R41.82 AMS (ALTERED MENTAL STATUS): ICD-10-CM

## 2021-03-24 DIAGNOSIS — I49.1 PAC (PREMATURE ATRIAL CONTRACTION): ICD-10-CM

## 2021-03-24 DIAGNOSIS — R06.00 EXERTIONAL DYSPNEA: ICD-10-CM

## 2021-03-24 DIAGNOSIS — R07.9 CHEST PAIN, UNSPECIFIED TYPE: Primary | ICD-10-CM

## 2021-03-24 DIAGNOSIS — I34.0 MODERATE TO SEVERE MITRAL REGURGITATION: ICD-10-CM

## 2021-03-24 DIAGNOSIS — I50.32 CHRONIC DIASTOLIC (CONGESTIVE) HEART FAILURE (HCC): ICD-10-CM

## 2021-03-24 DIAGNOSIS — R77.8 ELEVATED TROPONIN: ICD-10-CM

## 2021-03-24 DIAGNOSIS — R60.0 BILATERAL LEG EDEMA: ICD-10-CM

## 2021-03-24 PROBLEM — E87.0 HYPERNATREMIA: Status: ACTIVE | Noted: 2021-03-24

## 2021-03-24 PROBLEM — R26.2 AMBULATORY DYSFUNCTION: Chronic | Status: ACTIVE | Noted: 2021-03-24

## 2021-03-24 LAB
ALBUMIN SERPL BCP-MCNC: 3.6 G/DL (ref 3.5–5)
ALP SERPL-CCNC: 128 U/L (ref 46–116)
ALT SERPL W P-5'-P-CCNC: 22 U/L (ref 12–78)
ANION GAP SERPL CALCULATED.3IONS-SCNC: 9 MMOL/L (ref 4–13)
AST SERPL W P-5'-P-CCNC: 15 U/L (ref 5–45)
BACTERIA UR QL AUTO: NORMAL /HPF
BASOPHILS # BLD AUTO: 0.04 THOUSANDS/ΜL (ref 0–0.1)
BASOPHILS NFR BLD AUTO: 1 % (ref 0–1)
BILIRUB SERPL-MCNC: 0.53 MG/DL (ref 0.2–1)
BILIRUB UR QL STRIP: NEGATIVE
BUN SERPL-MCNC: 33 MG/DL (ref 5–25)
CALCIUM SERPL-MCNC: 9.1 MG/DL (ref 8.3–10.1)
CHLORIDE SERPL-SCNC: 107 MMOL/L (ref 100–108)
CLARITY UR: CLEAR
CO2 SERPL-SCNC: 30 MMOL/L (ref 21–32)
COLOR UR: ABNORMAL
CREAT SERPL-MCNC: 1.17 MG/DL (ref 0.6–1.3)
EOSINOPHIL # BLD AUTO: 0.13 THOUSAND/ΜL (ref 0–0.61)
EOSINOPHIL NFR BLD AUTO: 2 % (ref 0–6)
ERYTHROCYTE [DISTWIDTH] IN BLOOD BY AUTOMATED COUNT: 14.3 % (ref 11.6–15.1)
GFR SERPL CREATININE-BSD FRML MDRD: 44 ML/MIN/1.73SQ M
GLUCOSE SERPL-MCNC: 92 MG/DL (ref 65–140)
GLUCOSE UR STRIP-MCNC: NEGATIVE MG/DL
HCT VFR BLD AUTO: 38.6 % (ref 34.8–46.1)
HGB BLD-MCNC: 12.1 G/DL (ref 11.5–15.4)
HGB UR QL STRIP.AUTO: NEGATIVE
IMM GRANULOCYTES # BLD AUTO: 0.02 THOUSAND/UL (ref 0–0.2)
IMM GRANULOCYTES NFR BLD AUTO: 0 % (ref 0–2)
KETONES UR STRIP-MCNC: NEGATIVE MG/DL
LEUKOCYTE ESTERASE UR QL STRIP: ABNORMAL
LYMPHOCYTES # BLD AUTO: 0.9 THOUSANDS/ΜL (ref 0.6–4.47)
LYMPHOCYTES NFR BLD AUTO: 16 % (ref 14–44)
MCH RBC QN AUTO: 27.4 PG (ref 26.8–34.3)
MCHC RBC AUTO-ENTMCNC: 31.3 G/DL (ref 31.4–37.4)
MCV RBC AUTO: 88 FL (ref 82–98)
MONOCYTES # BLD AUTO: 0.51 THOUSAND/ΜL (ref 0.17–1.22)
MONOCYTES NFR BLD AUTO: 9 % (ref 4–12)
NEUTROPHILS # BLD AUTO: 3.88 THOUSANDS/ΜL (ref 1.85–7.62)
NEUTS SEG NFR BLD AUTO: 72 % (ref 43–75)
NITRITE UR QL STRIP: NEGATIVE
NON-SQ EPI CELLS URNS QL MICRO: NORMAL /HPF
NRBC BLD AUTO-RTO: 0 /100 WBCS
PH UR STRIP.AUTO: 5.5 [PH]
PLATELET # BLD AUTO: 178 THOUSANDS/UL (ref 149–390)
PLATELET # BLD AUTO: 180 THOUSANDS/UL (ref 149–390)
PMV BLD AUTO: 10.9 FL (ref 8.9–12.7)
PMV BLD AUTO: 11.1 FL (ref 8.9–12.7)
POTASSIUM SERPL-SCNC: 4.1 MMOL/L (ref 3.5–5.3)
PROT SERPL-MCNC: 7.5 G/DL (ref 6.4–8.2)
PROT UR STRIP-MCNC: NEGATIVE MG/DL
RBC # BLD AUTO: 4.41 MILLION/UL (ref 3.81–5.12)
RBC #/AREA URNS AUTO: NORMAL /HPF
SODIUM SERPL-SCNC: 146 MMOL/L (ref 136–145)
SP GR UR STRIP.AUTO: <=1.005 (ref 1–1.03)
TROPONIN I SERPL-MCNC: 0.06 NG/ML
TROPONIN I SERPL-MCNC: 0.07 NG/ML
TROPONIN I SERPL-MCNC: 0.07 NG/ML
TSH SERPL DL<=0.05 MIU/L-ACNC: 1.85 UIU/ML (ref 0.36–3.74)
UROBILINOGEN UR QL STRIP.AUTO: 0.2 E.U./DL
WBC # BLD AUTO: 5.48 THOUSAND/UL (ref 4.31–10.16)
WBC #/AREA URNS AUTO: NORMAL /HPF

## 2021-03-24 PROCEDURE — 85025 COMPLETE CBC W/AUTO DIFF WBC: CPT | Performed by: EMERGENCY MEDICINE

## 2021-03-24 PROCEDURE — 93248 EXT ECG>7D<15D REV&INTERPJ: CPT | Performed by: INTERNAL MEDICINE

## 2021-03-24 PROCEDURE — 85049 AUTOMATED PLATELET COUNT: CPT | Performed by: INTERNAL MEDICINE

## 2021-03-24 PROCEDURE — 84484 ASSAY OF TROPONIN QUANT: CPT | Performed by: INTERNAL MEDICINE

## 2021-03-24 PROCEDURE — 1123F ACP DISCUSS/DSCN MKR DOCD: CPT | Performed by: EMERGENCY MEDICINE

## 2021-03-24 PROCEDURE — 71046 X-RAY EXAM CHEST 2 VIEWS: CPT

## 2021-03-24 PROCEDURE — 84443 ASSAY THYROID STIM HORMONE: CPT | Performed by: EMERGENCY MEDICINE

## 2021-03-24 PROCEDURE — 99285 EMERGENCY DEPT VISIT HI MDM: CPT

## 2021-03-24 PROCEDURE — 99220 PR INITIAL OBSERVATION CARE/DAY 70 MINUTES: CPT | Performed by: INTERNAL MEDICINE

## 2021-03-24 PROCEDURE — 84484 ASSAY OF TROPONIN QUANT: CPT | Performed by: EMERGENCY MEDICINE

## 2021-03-24 PROCEDURE — 70450 CT HEAD/BRAIN W/O DYE: CPT

## 2021-03-24 PROCEDURE — 99204 OFFICE O/P NEW MOD 45 MIN: CPT | Performed by: INTERNAL MEDICINE

## 2021-03-24 PROCEDURE — 80053 COMPREHEN METABOLIC PANEL: CPT | Performed by: EMERGENCY MEDICINE

## 2021-03-24 PROCEDURE — 99285 EMERGENCY DEPT VISIT HI MDM: CPT | Performed by: EMERGENCY MEDICINE

## 2021-03-24 PROCEDURE — 36415 COLL VENOUS BLD VENIPUNCTURE: CPT | Performed by: EMERGENCY MEDICINE

## 2021-03-24 PROCEDURE — 81001 URINALYSIS AUTO W/SCOPE: CPT | Performed by: EMERGENCY MEDICINE

## 2021-03-24 RX ORDER — ONDANSETRON 2 MG/ML
4 INJECTION INTRAMUSCULAR; INTRAVENOUS EVERY 6 HOURS PRN
Status: DISCONTINUED | OUTPATIENT
Start: 2021-03-24 | End: 2021-03-25 | Stop reason: HOSPADM

## 2021-03-24 RX ORDER — ATORVASTATIN CALCIUM 40 MG/1
40 TABLET, FILM COATED ORAL EVERY EVENING
Status: DISCONTINUED | OUTPATIENT
Start: 2021-03-24 | End: 2021-03-25 | Stop reason: HOSPADM

## 2021-03-24 RX ORDER — LEVOTHYROXINE SODIUM 88 UG/1
88 TABLET ORAL DAILY
Status: DISCONTINUED | OUTPATIENT
Start: 2021-03-25 | End: 2021-03-25 | Stop reason: HOSPADM

## 2021-03-24 RX ORDER — DULOXETIN HYDROCHLORIDE 20 MG/1
20 CAPSULE, DELAYED RELEASE ORAL DAILY
Status: DISCONTINUED | OUTPATIENT
Start: 2021-03-24 | End: 2021-03-25 | Stop reason: HOSPADM

## 2021-03-24 RX ORDER — LOSARTAN POTASSIUM 25 MG/1
25 TABLET ORAL
Status: DISCONTINUED | OUTPATIENT
Start: 2021-03-24 | End: 2021-03-25 | Stop reason: HOSPADM

## 2021-03-24 RX ORDER — AMLODIPINE BESYLATE 10 MG/1
10 TABLET ORAL DAILY
Status: DISCONTINUED | OUTPATIENT
Start: 2021-03-25 | End: 2021-03-25 | Stop reason: HOSPADM

## 2021-03-24 RX ORDER — AMLODIPINE BESYLATE 5 MG/1
10 TABLET ORAL DAILY
Status: DISCONTINUED | OUTPATIENT
Start: 2021-03-24 | End: 2021-03-24

## 2021-03-24 RX ORDER — GABAPENTIN 100 MG/1
100 CAPSULE ORAL 3 TIMES DAILY
Status: DISCONTINUED | OUTPATIENT
Start: 2021-03-24 | End: 2021-03-25 | Stop reason: HOSPADM

## 2021-03-24 RX ORDER — PANTOPRAZOLE SODIUM 40 MG/1
40 TABLET, DELAYED RELEASE ORAL
Status: DISCONTINUED | OUTPATIENT
Start: 2021-03-25 | End: 2021-03-25 | Stop reason: HOSPADM

## 2021-03-24 RX ORDER — ACETAMINOPHEN 325 MG/1
650 TABLET ORAL EVERY 6 HOURS PRN
Status: DISCONTINUED | OUTPATIENT
Start: 2021-03-24 | End: 2021-03-25 | Stop reason: HOSPADM

## 2021-03-24 RX ORDER — LOSARTAN POTASSIUM 50 MG/1
50 TABLET ORAL DAILY
Status: DISCONTINUED | OUTPATIENT
Start: 2021-03-25 | End: 2021-03-24

## 2021-03-24 RX ORDER — NITROGLYCERIN 0.4 MG/1
0.4 TABLET SUBLINGUAL
Status: DISCONTINUED | OUTPATIENT
Start: 2021-03-24 | End: 2021-03-25 | Stop reason: HOSPADM

## 2021-03-24 RX ORDER — LOSARTAN POTASSIUM 25 MG/1
25 TABLET ORAL
Status: CANCELLED | OUTPATIENT
Start: 2021-03-24

## 2021-03-24 RX ORDER — SENNOSIDES 8.6 MG
1 TABLET ORAL DAILY
Status: DISCONTINUED | OUTPATIENT
Start: 2021-03-24 | End: 2021-03-25 | Stop reason: HOSPADM

## 2021-03-24 RX ORDER — LOSARTAN POTASSIUM 50 MG/1
50 TABLET ORAL DAILY
Status: DISCONTINUED | OUTPATIENT
Start: 2021-03-25 | End: 2021-03-25 | Stop reason: HOSPADM

## 2021-03-24 RX ORDER — MAGNESIUM HYDROXIDE/ALUMINUM HYDROXICE/SIMETHICONE 120; 1200; 1200 MG/30ML; MG/30ML; MG/30ML
30 SUSPENSION ORAL ONCE
Status: COMPLETED | OUTPATIENT
Start: 2021-03-24 | End: 2021-03-24

## 2021-03-24 RX ORDER — BUMETANIDE 1 MG/1
2 TABLET ORAL DAILY
Status: DISCONTINUED | OUTPATIENT
Start: 2021-03-25 | End: 2021-03-25 | Stop reason: HOSPADM

## 2021-03-24 RX ADMIN — LOSARTAN POTASSIUM 25 MG: 25 TABLET, FILM COATED ORAL at 21:23

## 2021-03-24 RX ADMIN — SENNOSIDES 8.6 MG: 8.6 TABLET, FILM COATED ORAL at 15:41

## 2021-03-24 RX ADMIN — GABAPENTIN 100 MG: 100 CAPSULE ORAL at 15:41

## 2021-03-24 RX ADMIN — GABAPENTIN 100 MG: 100 CAPSULE ORAL at 21:23

## 2021-03-24 RX ADMIN — DESMOPRESSIN ACETATE 40 MG: 0.2 TABLET ORAL at 17:14

## 2021-03-24 RX ADMIN — ENOXAPARIN SODIUM 40 MG: 40 INJECTION SUBCUTANEOUS at 17:15

## 2021-03-24 RX ADMIN — ALUMINUM HYDROXIDE, MAGNESIUM HYDROXIDE, AND SIMETHICONE 30 ML: 200; 200; 20 SUSPENSION ORAL at 13:35

## 2021-03-24 NOTE — TELEPHONE ENCOUNTER
Daughter called this morning requesting for the office to call her with the monitor results: please call patient direct at [phone]

## 2021-03-24 NOTE — TELEPHONE ENCOUNTER
Spoke w/patient's Daughter; the heart monitor had just been uploaded in to the patient's chart  Provider will review and provide his interrogation of the report  Patient is currently in the ER for confusion and dizziness-referred by PCP

## 2021-03-24 NOTE — ASSESSMENT & PLAN NOTE
· Patient having confusion, disorientation, poor memory, headaches, dizziness and generalized weakness for 1 week  · CT scan of the head revealed: No acute intracranial pathology  Surgical changes of prior right-sided craniotomy and cranioplasty with bone flap replacement   Unchanged right temporal lobe encephalomalacia and gliosis  · Neuro checks  · Orthostatic vital signs  · Diuretic medications on hold due to possible dehydration  · Neurology consult (neurology advanced practitioner informed)  · PT OT evaluation (at baseline, patient uses a walker or a cane); also for OT cognitive evaluation

## 2021-03-24 NOTE — ASSESSMENT & PLAN NOTE
Wt Readings from Last 3 Encounters:   02/24/21 80 7 kg (178 lb)   12/29/20 79 4 kg (175 lb)   11/05/20 83 kg (183 lb)     · Presently compensated  In fact, patient may be in the dry side at this point as patient's serum sodium is elevated as well as BUN, with dry skin  No signs of congestion  · Hold off Bumex and Aldactone for now  · Monitor input and output  · Weigh patient daily

## 2021-03-24 NOTE — H&P
[address] 1940, 79 y o  female MRN: [id number]  Unit/Bed#: ED 18 Encounter: [id number]  Primary Care Provider: Monse Bowser MD   Date and time admitted to hospital: 3/24/2021 10:46 AM    * AMS (altered mental status)  Assessment & Plan  · Patient having confusion, disorientation, poor memory, headaches, dizziness and generalized weakness for 1 week  · CT scan of the head revealed: No acute intracranial pathology  Surgical changes of prior right-sided craniotomy and cranioplasty with bone flap replacement   Unchanged right temporal lobe encephalomalacia and gliosis  · Neuro checks  · Orthostatic vital signs  · Diuretic medications on hold due to possible dehydration  · Neurology consult (neurology advanced practitioner informed)  · PT OT evaluation (at baseline, patient uses a walker or a cane); also for OT cognitive evaluation  Chest pain  Assessment & Plan  · Mildly elevated troponin at 0 06   · Rule out acute coronary syndrome  · NSTEMI type 1 versus NSTEMI type 2, with patient having history of palpitations; sinus bradycardia with PACs on EKG  · Not given aspirin in the emergency room due to history of intracerebral hemorrhage, status post surgery  · Cardiology consult (cardiologist informed)  · Telemetry  · Continue trending troponins  · Nitroglycerin p r n     · Oxygen p r n  Yayo Roulette · Pain control p r n  Hypernatremia  Assessment & Plan  · Mild hypernatremia with elevated BUN  · Likely mild dehydration  · Hold off diuretic medications for now  · Monitor  · For further workup and management if this worsens significantly  Chronic diastolic (congestive) heart failure [facility])  Assessment & Plan  Wt Readings from Last 3 Encounters:   02/24/21 80 7 kg (178 lb)   12/29/20 79 4 kg (175 lb)   11/05/20 83 kg (183 lb)     · Presently compensated    In fact, patient may be in the dry side at this point as patient's serum sodium is elevated as well as BUN, with dry skin  No signs of congestion  · Hold off Bumex and Aldactone for now  · Monitor input and output  · Weigh patient daily  VTE Prophylaxis: Enoxaparin (Lovenox)  / sequential compression device   Code Status:  DNR DNI  POLST: POLST form is not discussed and not completed at this time  Discussion with family:  I spoke to the patient's daughter in-law at bedside and discussed with her our findings, management and plans for her mother-in-law  Answered all questions and concerns  She is okay with the management and plans (enumerated above, including the DVT prophylaxis, as patient has history of ICH)  Anticipated Length of Stay:  Patient will be admitted on an Observation basis with an anticipated length of stay of  less than 2 midnights  Justification for Hospital Stay:  Due to the above findings and plans  Total Time for Visit, including Counseling / Coordination of Care: 1 hour  Greater than 50% of this total time spent on direct patient counseling and coordination of care  Chief Complaint:   Had multiple complaints  Main ones are headaches, poor memory/confusion for about a week; chest pain, while in the ER  History of Present Illness:    Timmy Antoine is a 79 y o  female who presents to the emergency room [employer] with multiple complaints but primarily headaches, poor memory/confusion for about a week now  Medical history significant for history of right intracerebral hemorrhage, status post Neurosurgery, February of last year, hypertension, CHF, dyslipidemia and prediabetes  Patient has been having palpitations lately and a ZIO heart monitor was provided  According to the cardiologist medical assistant notes, the heart monitor has ended today and report summary will be done by Dr Martina Hunt  Patient complains of headaches, generalized, for the past 1 week associated with having acute for memory and confusion    According to the patient's daughter in-law, bedside, prior to this, patient's memory is sharp/pristine    According to the patient's daughter in-law, lately, patient has been having episodes of disorientation, confusion and poor memory  To illustrate this, patient's daughter in-law told me, that patient forgets now were the telephone is  Patient usually walks with a cane or a walker  Patient had history of falls in the past, but none lately  Patient claims also of generalized weakness, but no focal weakness or numbness  Patient claims of occasional nausea, but no actual vomiting episodes, with mild abdominal pain  In the ER, patient developed chest pains, at the left lower anterior chest area, stabbing in character  Patient denies any shortness of breath  Patient admits to constipation, but had bowel movements yesterday and today (according to her, sometimes it is hard to move her bowels)  Patient denies any other symptoms other the ones mentioned above  Review of Systems:    Review of Systems     Ten point review systems done and they were negative except for the ones I mentioned my history of present illness  Patient denies any loss of consciousness  Patient denies any fever, chills or any cough or colds  Patient denies any urinary symptoms  For the other symptoms, please see my HPI      Past Medical and Surgical History:     Past Medical History:   Diagnosis Date    Anxiety     Arthritis     joints    Cataract     Disease of thyroid gland     Eczema     GERD (gastroesophageal reflux disease)     Gout     Heart failure ([facility] [address] )     Hepatitis     Hiatal hernia     Hypertension     Onychomycosis     last assessed: 16    Orthopnea     resolved: 16    Pseudogout of left wrist     Sleep apnea     wears CPAP    Stroke [facility])        Past Surgical History:   Procedure Laterality Date    ABDOMINAL SURGERY          BRAIN HEMATOMA EVACUATION Right 2020    Procedure: Right decompressive craniectomy and evacuation of intraparenchymal hematoma; Surgeon: Rinku Ring MD;  [facility];  Service: Neurosurgery    BREAST SURGERY Right     cyst removal    CARPAL TUNNEL RELEASE Left     CARPAL TUNNEL RELEASE Right     CATARACT EXTRACTION       SECTION  1960    x1    COLONOSCOPY N/A 2018    Procedure: COLONOSCOPY;  Surgeon: Giselle Lozano MD;  Location: [place] [address] GI LAB; Service: Gastroenterology    DILATION AND CURETTAGE OF UTERUS  1967    EYE SURGERY Left 2006    cataracts    HERNIA REPAIR      umbilical hernia    INCISIONAL HERNIA REPAIR      incarcerated    KNEE ARTHROSCOPY Right     AZ REPLACE SKULL PLATE/FLAP Right     Procedure: right frontotemporal replacement cranioplasty;  Surgeon: Rinku Ring MD;  [facility];  Service: Neurosurgery    AZ XCAPSL CTRC RMVL INSJ IO LENS PROSTH W/O ECP Right 3/27/2017    Procedure: EXTRACTION EXTRACAPSULAR CATARACT PHACO INTRAOCULAR LENS (IOL); Surgeon: Jad Ross MD;  [facility] MAIN OR;  Service: Ophthalmology       Meds/Allergies:    Prior to Admission medications    Medication Sig Start Date End Date Taking?  Authorizing Provider   amLODIPine (NORVASC) 10 mg tablet Take 1 tablet (10 mg total) by mouth daily 20   Shelby Min MD   atorvastatin (LIPITOR) 40 mg tablet Take 1 tablet (40 mg total) by mouth every evening 20   Suzanna Loepz MD   bumetanide (BUMEX) 2 mg tablet Take 1 tablet (2 mg total) by mouth daily 20   Roscoe Caraballo MD   DULoxetine (CYMBALTA) 20 mg capsule Take 1 capsule (20 mg total) by mouth daily 21   Shelby Min MD   gabapentin (NEURONTIN) 100 mg capsule Take 1 capsule (100 mg total) by mouth 3 (three) times a day 21   Shelby Min MD   levothyroxine 88 mcg tablet Take 1 tablet (88 mcg total) by mouth daily 20   Shelby Min MD   losartan (COZAAR) 25 mg tablet Take 2 tab in AM and 1 tab in PM 2/15/21   Roscoe Caraballo MD omeprazole (PriLOSEC) 20 mg delayed release capsule Take 1 capsule (20 mg total) by mouth every morning 2/15/21   Suzanna Lopez MD   ondansetron (ZOFRAN) 4 mg tablet Take 1 tablet (4 mg total) by mouth every 8 (eight) hours as needed for nausea or vomiting  Patient not taking: Reported on 2/24/2021 1/18/21   Shelby Min MD   spironolactone (ALDACTONE) 25 mg tablet Take 0 5 tablets (12 5 mg total) by mouth daily 8/5/20   Roscoe Caraballo MD     Medication list was reviewed  Allergies: No Known Allergies    Social History:     Marital Status:      Social History     Substance and Sexual Activity   Alcohol Use Yes    Alcohol/week: 0 0 standard drinks    Frequency: Monthly or less    Drinks per session: 1 or 2    Binge frequency: Never    Comment: Rare  Social History     Tobacco Use   Smoking Status Never Smoker   Smokeless Tobacco Never Used     Social History     Substance and Sexual Activity   Drug Use Never       Family History:    Family History   Problem Relation Age of Onset    Diabetes Mother     Coronary artery disease Mother     Arthritis Mother     Hypertension Mother     Hypertension Father     Diabetes Brother     Diabetes Son     Arthritis Family        Physical Exam:     Vitals:   Blood Pressure: 160/70 (03/24/21 1443)  Pulse: 63 (03/24/21 1443)  Temperature: 98 8 °F (37 1 °C) (03/24/21 1045)  Temp Source: Oral (03/24/21 1045)  Respirations: 20 (03/24/21 1443)  SpO2: 97 % (03/24/21 1443)    Physical Exam  Vitals signs and nursing note reviewed  Constitutional:       General: She is not in acute distress  Appearance: She is not ill-appearing, toxic-appearing or diaphoretic  HENT:      Head: Normocephalic and atraumatic  Mouth/Throat:      Mouth: Mucous membranes are moist       Pharynx: Oropharynx is clear  No oropharyngeal exudate or posterior oropharyngeal erythema  Eyes:      General: No scleral icterus  Right eye: No discharge           Left eye: No discharge  Neck:      Comments: No carotid bruits appreciated, bilaterally  No JVD  Cardiovascular:      Rate and Rhythm: Normal rate and regular rhythm  Heart sounds: Normal heart sounds  No murmur  No friction rub  No gallop  Comments: Regular heart rhythm with occasional heart rhythm irregularities  Pulmonary:      Effort: Pulmonary effort is normal  No respiratory distress  Breath sounds: Normal breath sounds  No stridor  No wheezing, rhonchi or rales  Chest:      Chest wall: No tenderness  Abdominal:      General: Bowel sounds are normal  There is no distension  Palpations: Abdomen is soft  Tenderness: There is abdominal tenderness  There is no guarding or rebound  Hernia: No hernia is present  Comments: Mild tenderness at the periumbilical area  Musculoskeletal:      Right lower leg: No edema  Left lower leg: No edema  Skin:     General: Skin is warm and dry  Coloration: Skin is not pale  Findings: No erythema or rash  Neurological:      General: No focal deficit present  Mental Status: She is oriented to person, place, and time  Cranial Nerves: No cranial nerve deficit  Sensory: No sensory deficit  Motor: Weakness present  Comments: Patient is coherent  Motor examination were 4-5 minus over 5 in all extremities  Psychiatric:         Behavior: Behavior normal          Thought Content: Thought content normal       Comments: Anxious  Additional Data:     Lab Results: I have personally reviewed pertinent reports        Results from last 7 days   Lab Units 03/24/21  1149   WBC Thousand/uL 5 48   HEMOGLOBIN g/dL 12 1   HEMATOCRIT % 38 6   PLATELETS Thousands/uL 180   NEUTROS PCT % 72   LYMPHS PCT % 16   MONOS PCT % 9   EOS PCT % 2     Results from last 7 days   Lab Units 03/24/21  1149   SODIUM mmol/L 146*   POTASSIUM mmol/L 4 1   CHLORIDE mmol/L 107   CO2 mmol/L 30   BUN mg/dL 33*   CREATININE mg/dL 1 17 ANION GAP mmol/L 9   CALCIUM mg/dL 9 1   ALBUMIN g/dL 3 6   TOTAL BILIRUBIN mg/dL 0 53   ALK PHOS U/L 128*   ALT U/L 22   AST U/L 15   GLUCOSE RANDOM mg/dL 92                       Imaging: I have personally reviewed pertinent reports  XR chest 2 views   ED Interpretation by Clive Arrieta DO (03/24 1203)   No infiltrates  Borderline cardiomegaly      Final Result by Ian Jordan MD (03/24 1313)   No acute cardiopulmonary disease  Findings are stable               Workstation performed: [id number]         CT head without contrast   Final Result by Sophy Villeda MD (03/24 1228)      No acute intracranial pathology  Surgical changes of prior right-sided craniotomy and cranioplasty with bone flap replacement  Unchanged right temporal lobe encephalomalacia and gliosis  Workstation performed: [id number]             EKG, Pathology, and Other Studies Reviewed on Admission:   · EKG: I personally read patient's EKG revealing sinus bradycardia at 56 per minute with PACs; low-voltage QRS; right bundle branch block  Allscripts / Epic Records Reviewed: Yes     ** Please Note: This note has been constructed using a voice recognition system   **

## 2021-03-24 NOTE — CONSULTS
Cardiology   Jeimy Wise 79 y o  female MRN: [id number]  Unit/Bed#: ED 25 Encounter: [id number]      Reason for Consult / Principal Problem:  Elevated troponin    Physician Requesting Consult:  Lynn Spencer MD    Cardiologist:  Dr Dhruv Bazzi  1  Elevated troponin -- unclear etiology, low suspicion for ACS  2  Left-sided chest wall tenderness -- under left breast -- reproducible with palpitation which intensifies symptoms suspect musculoskeletal etiology, -- also has belching/indigestion possible GI etiology  -12 lead ECG 3/24; NSR, RBBB (chronic), baseline ST T-wave abnormalities -- no significant changes  -Troponin x1; 0 06  -Pharmacologic stress test 1/2019; no perfusion defects    2  Report of altered mental status ? appears to be at baseline per patient's son who is at the bedside -- patient is alert and oriented x 3, nonfocal on exam  -Neuro consulted  -CT scan of the head; no acute intracranial pathology    3  Chronic diastolic CHF  -On PE appears to be compensated; has stable chronic bilateral lower extremity edema; may be on the dry side (mucous membranes are dry/ neck veins are flat-- and patient feeling thirsty)  -TTE 2/2020; LVEF 65%, increased wall thickness, LV diastolic function parameters abnormal, RV mildly dilated, RV systolic function was low normal, LA moderately to markedly dilated, RA mildly dilated, moderate to severe MR/mild MS, mild AI, mild-to-moderate TR  -Previously on Bumex 2 mg daily + Aldactone 12 5 mg daily (both agents have been placed on hold)    4   Palpitations/history of frequent PACs  -Zio patch monitor 3/1-3/14/2021; no evidence of atrial fibrillation; predominant NSR, RBBB,109 supraventricular tachycardia runs occurred,  the run with the fastest interval lasting 5 beats with a max rate of 179  bpm, the longest lasting 29 4 secs with an avg rate of 103 bpm  -24 hour telemetry review; predominant NSR/SB, rates 50s to low 60s, occasional PACs no evidence of atrial fibrillation  -Not on a BB at baseline -- secondary to baseline sinus bradycardia    5  Hypertension  -BP stable last recorded 143/64, HR 62  -Outpatient BP regimen; amlodipine 10 mg daily, losartan 50 mg in the a m , and 25 mg in the p m , spironolactone 12 5 mg daily    6  Dyslipidemia; on atorvastatin 40 mg daily    Plan  -Agree with holding diuretics for today -- suspect component of dehydration as labs and exam reflective of this  -No further cardiac testing warranted at this time  -Continue to trend troponin until peak   -Continue to monitor on telemetry    HPI: Jim Sanchez 79y o  year old female with a medical history of chronic diastolic CHF, moderate to severe MR, chronic exertional dyspnea, essential hypertension, frequent PACs/irregular heart rhythm, chronic RBBB, CKD stage III, WENDI on CPAP, and intraparenchymal bleed/cranioplasty    She routinely follows with Dr Fortunato Marcos with the  [facility]  She was recently seen as an outpatient on 2/24/2021  Overall reported to have been doing well from a cardiac standpoint  Patient denied worsening shortness of breath or increasing lower extremity edema  She was continued on oral Bumex 2 mg daily; her office weight was 178 lb  She did have complaints palpitations and given her history of frequent PACs she was ordered an outpatient Zio patch monitor which demonstrated predominant NSR, RBBB,109 supraventricular tachycardia runs occurred,  the run with the fastest interval lasting 5 beats with a max rate of 179  bpm, the longest lasting 29 4 secs with an avg rate of 103 bpm; no definitive evidence of atrial fibrillation - per official read  Patient states she has been feeling well from a cardiac standpoint  Since seeing Dr Fortunato Marcos in the office, she denies any progressively worsening symptoms of shortness of breath, dyspnea, weight gain/increasing lower extremity edema      Patient [facility] ED on 3/24/2021 with complaints of headache, lightheadedness, and fatigue over the past 2 days  Further workup in the ED  Hemodynamics on admission  Temp 98 8° degrees F, HR 59, R 20, /70, sat 97% on RA  Laboratory data on admission  NA + 146, K +4 1, chloride 107, CO2 30, anion gap 9, BUN 33, creatinine 1 17, glucose 92, calcium 9 1, AST 15, ALT 22, alk-phos 128, albumin 3 6, WBC 5 5, HGB 12 1, and platelet count 120  Troponin x1 0 06  Imaging  Chest x-ray PA and lateral; no acute cardiopulmonary disease    12 lead ECG ECG NSR, RBBB, ST T-wave abnormalities    Family History:   Family History   Problem Relation Age of Onset    Diabetes Mother     Coronary artery disease Mother     Arthritis Mother     Hypertension Mother     Hypertension Father     Diabetes Brother     Diabetes Son     Arthritis Family      Historical Information   Past Medical History:   Diagnosis Date    Anxiety     Arthritis     joints    Cataract     Disease of thyroid gland     Eczema     GERD (gastroesophageal reflux disease)     Gout     Heart failure ([facility] [address] )     Hepatitis     Hiatal hernia     Hypertension     Onychomycosis     last assessed: 16    Orthopnea     resolved: 16    Pseudogout of left wrist     Sleep apnea     wears CPAP    Stroke [facility])      Past Surgical History:   Procedure Laterality Date    ABDOMINAL SURGERY          BRAIN HEMATOMA EVACUATION Right 2020    Procedure: Right decompressive craniectomy and evacuation of intraparenchymal hematoma; Surgeon: Dom Fitzpatrick MD;  Location:  MAIN OR;  Service: Neurosurgery    BREAST SURGERY Right     cyst removal    CARPAL TUNNEL RELEASE Left     CARPAL TUNNEL RELEASE Right 2004    CATARACT EXTRACTION       SECTION  1960    x1    COLONOSCOPY N/A 2018    Procedure: COLONOSCOPY;  Surgeon: Vinod Kaufman MD;  Location: [place] [address] GI LAB;   Service: Gastroenterology    DILATION AND CURETTAGE OF UTERUS  7029    EYE SURGERY Left  cataracts    HERNIA REPAIR      umbilical hernia    INCISIONAL HERNIA REPAIR      incarcerated    KNEE ARTHROSCOPY Right     IN REPLACE SKULL PLATE/FLAP Right 6/7/8269    Procedure: right frontotemporal replacement cranioplasty;  Surgeon: Lew De Leon MD;  Location:  MAIN OR;  Service: Neurosurgery    IN XCAPSL CTRC RMVL INSJ IO LENS PROSTH W/O ECP Right 3/27/2017    Procedure: EXTRACTION EXTRACAPSULAR CATARACT PHACO INTRAOCULAR LENS (IOL); Surgeon: Davi Banks MD;  Location: [facility] MAIN OR;  Service: Ophthalmology     Social History   Social History     Substance and Sexual Activity   Alcohol Use Yes    Alcohol/week: 0 0 standard drinks    Frequency: Monthly or less    Drinks per session: 1 or 2    Binge frequency: Never    Comment: Rare  Social History     Substance and Sexual Activity   Drug Use Never     Social History     Tobacco Use   Smoking Status Never Smoker   Smokeless Tobacco Never Used     Family History:   Family History   Problem Relation Age of Onset    Diabetes Mother     Coronary artery disease Mother     Arthritis Mother     Hypertension Mother     Hypertension Father     Diabetes Brother     Diabetes Son     Arthritis Family        Review of Systems:  Review of Systems   Constitutional: Positive for fatigue  Negative for chills and fever  HENT: Negative for congestion  Eyes: Negative for visual disturbance  Respiratory: Negative for cough, chest tightness and shortness of breath  Cardiovascular: Positive for leg swelling  Negative for chest pain and palpitations  Chronic lower extremity edema -- but stable per patient   Gastrointestinal: Negative for abdominal pain, constipation, diarrhea, nausea and vomiting         + burping/indigestion   Genitourinary: Negative for dysuria  Musculoskeletal: Negative for arthralgias and myalgias  Neurological: Positive for light-headedness and headaches  Negative for dizziness     All other systems reviewed and are negative            Scheduled Meds:  Current Facility-Administered Medications   Medication Dose Route Frequency Provider Last Rate    acetaminophen  650 mg Oral Q6H PRN Sudhir Caldwell MD      [START ON 3/25/2021] amLODIPine  10 mg Oral Daily Sudhir Caldwell MD      atorvastatin  40 mg Oral QPM Sudhir Caldwell MD      DULoxetine  20 mg Oral Daily Sudhir Caldwell MD      enoxaparin  40 mg Subcutaneous Daily Sudhir Caldwell MD      gabapentin  100 mg Oral TID Sudhir Caldwell MD     [facility] [START ON 3/25/2021] levothyroxine  88 mcg Oral Daily Sudhir Caldwell MD      losartan  25 mg Oral HS Sudhir Caldwell MD      [START ON 3/25/2021] losartan  50 mg Oral Daily Sudhir Caldwell MD      nitroglycerin  0 4 mg Sublingual Q5 Min PRN Sudhir Caldwell MD      ondansetron  4 mg Intravenous Q6H PRN Sudhir Caldwell MD      [START ON 3/25/2021] pantoprazole  40 mg Oral Early Morning Sudhir Caldwell MD      senna  1 tablet Oral Daily Sudhir Caldwell MD       Continuous Infusions:   PRN Meds:   acetaminophen    nitroglycerin    ondansetron  all current active meds have been reviewed and current meds:   Current Facility-Administered Medications   Medication Dose Route Frequency    acetaminophen (TYLENOL) tablet 650 mg  650 mg Oral Q6H PRN    [START ON 3/25/2021] amLODIPine (NORVASC) tablet 10 mg  10 mg Oral Daily    atorvastatin (LIPITOR) tablet 40 mg  40 mg Oral QPM    DULoxetine (CYMBALTA) delayed release capsule 20 mg  20 mg Oral Daily    enoxaparin (LOVENOX) subcutaneous injection 40 mg  40 mg Subcutaneous Daily    gabapentin (NEURONTIN) capsule 100 mg  100 mg Oral TID    [START ON 3/25/2021] levothyroxine tablet 88 mcg  88 mcg Oral Daily    losartan (COZAAR) tablet 25 mg  25 mg Oral HS    [START ON 3/25/2021] losartan (COZAAR) tablet 50 mg  50 mg Oral Daily    nitroglycerin (NITROSTAT) SL tablet 0 4 mg  0 4 mg Sublingual Q5 Min PRN    ondansetron [facility]) injection 4 mg  4 mg Intravenous Q6H PRN    [START ON 3/25/2021] pantoprazole (PROTONIX) EC tablet 40 mg  40 mg Oral Early Morning    senna (SENOKOT) tablet 8 6 mg  1 tablet Oral Daily       No Known Allergies    Objective   Vitals: Blood pressure 143/64, pulse 62, temperature 98 8 °F (37 1 °C), temperature source Oral, resp  rate 20, SpO2 97 %, not currently breastfeeding  , There is no height or weight on file to calculate BMI ,   Orthostatic Blood Pressures      Most Recent Value   Blood Pressure  143/64 filed at 03/24/2021 1530   Patient Position - Orthostatic VS  Lying filed at 03/24/2021 1443          No intake or output data in the 24 hours ending 03/24/21 1542    Invasive Devices     Peripheral Intravenous Line            Peripheral IV 03/24/21 Right Antecubital less than 1 day                Physical Exam:  Physical Exam  Vitals signs and nursing note reviewed  Constitutional:       General: She is not in acute distress  Appearance: Normal appearance  She is obese  She is not ill-appearing  HENT:      Mouth/Throat:      Mouth: Mucous membranes are dry  Comments: Mucous membranes are pink and dry  Eyes:      General: No scleral icterus  Neck:      Comments: No JVD  Cardiovascular:      Rate and Rhythm: Normal rate and regular rhythm  Pulses: Normal pulses  Heart sounds: Normal heart sounds  No murmur  Comments: Occasional PACs  Pulmonary:      Effort: Pulmonary effort is normal       Breath sounds: Normal breath sounds  No wheezing or rales  Abdominal:      Palpations: Abdomen is soft  Musculoskeletal:         General: Tenderness present  Right lower leg: Edema present  Left lower leg: Edema present  Comments: Left chest wall tenderness with palpation  Trace to mild nonpitting bilateral lower extremity edema    Skin:     General: Skin is warm and dry  Capillary Refill: Capillary refill takes less than 2 seconds     Neurological:      General: No focal deficit present  Mental Status: She is alert and oriented to person, place, and time     Psychiatric:         Mood and Affect: Mood normal          Lab Results:   Recent Results (from the past 24 hour(s))   CBC and differential    Collection Time: 03/24/21 11:49 AM   Result Value Ref Range    WBC 5 48 4 31 - 10 16 Thousand/uL    RBC 4 41 3 81 - 5 12 Million/uL    Hemoglobin 12 1 11 5 - 15 4 g/dL    Hematocrit 38 6 34 8 - 46 1 %    MCV 88 82 - 98 fL    MCH 27 4 26 8 - 34 3 pg    MCHC 31 3 (L) 31 4 - 37 4 g/dL    RDW 14 3 11 6 - 15 1 %    MPV 11 1 8 9 - 12 7 fL    Platelets 213 316 - 783 Thousands/uL    nRBC 0 /100 WBCs    Neutrophils Relative 72 43 - 75 %    Immat GRANS % 0 0 - 2 %    Lymphocytes Relative 16 14 - 44 %    Monocytes Relative 9 4 - 12 %    Eosinophils Relative 2 0 - 6 %    Basophils Relative 1 0 - 1 %    Neutrophils Absolute 3 88 1 85 - 7 62 Thousands/µL    Immature Grans Absolute 0 02 0 00 - 0 20 Thousand/uL    Lymphocytes Absolute 0 90 0 60 - 4 47 Thousands/µL    Monocytes Absolute 0 51 0 17 - 1 22 Thousand/µL    Eosinophils Absolute 0 13 0 00 - 0 61 Thousand/µL    Basophils Absolute 0 04 0 00 - 0 10 Thousands/µL   Comprehensive metabolic panel    Collection Time: 03/24/21 11:49 AM   Result Value Ref Range    Sodium 146 (H) 136 - 145 mmol/L    Potassium 4 1 3 5 - 5 3 mmol/L    Chloride 107 100 - 108 mmol/L    CO2 30 21 - 32 mmol/L    ANION GAP 9 4 - 13 mmol/L    BUN 33 (H) 5 - 25 mg/dL    Creatinine 1 17 0 60 - 1 30 mg/dL    Glucose 92 65 - 140 mg/dL    Calcium 9 1 8 3 - 10 1 mg/dL    AST 15 5 - 45 U/L    ALT 22 12 - 78 U/L    Alkaline Phosphatase 128 (H) 46 - 116 U/L    Total Protein 7 5 6 4 - 8 2 g/dL    Albumin 3 6 3 5 - 5 0 g/dL    Total Bilirubin 0 53 0 20 - 1 00 mg/dL    eGFR 44 ml/min/1 73sq m   UA (URINE) with reflex to Scope    Collection Time: 03/24/21 11:49 AM   Result Value Ref Range    Color, UA Light Yellow     Clarity, UA Clear     Specific Gravity, UA <=1 005 1 003 - 1 030 pH, UA 5 5 4 5, 5 0, 5 5, 6 0, 6 5, 7 0, 7 5, 8 0    Leukocytes, UA Small (A) Negative    Nitrite, UA Negative Negative    Protein, UA Negative Negative mg/dl    Glucose, UA Negative Negative mg/dl    Ketones, UA Negative Negative mg/dl    Urobilinogen, UA 0 2 0 2, 1 0 E U /dl E U /dl    Bilirubin, UA Negative Negative    Blood, UA Negative Negative   Troponin I    Collection Time: 03/24/21 11:49 AM   Result Value Ref Range    Troponin I 0 06 (H) <=0 04 ng/mL   TSH    Collection Time: 03/24/21 11:49 AM   Result Value Ref Range    TSH 3RD GENERATON 1 847 0 358 - 3 740 uIU/mL   Urine Microscopic    Collection Time: 03/24/21 11:49 AM   Result Value Ref Range    RBC, UA None Seen None Seen, 0-1, 1-2, 2-4, 0-5 /hpf    WBC, UA 1-2 None Seen, 0-1, 1-2, 0-5, 2-4 /hpf    Epithelial Cells None Seen None Seen, Occasional /hpf    Bacteria, UA None Seen None Seen, Occasional /hpf     Imaging: I have personally reviewed pertinent reports  and I have personally reviewed pertinent films in PACS  Code Status:  Level 3 DNR DNI  Epic/ Allscripts/Care Everywhere records reviewed:  Yes    * Please Note: Fluency DirectDictation voice to text software may have been used in the creation of this document   **

## 2021-03-24 NOTE — TELEPHONE ENCOUNTER
Patients daughter in law called and advised that Shubham Lilly is having increased confusion and headache  With patient past history of CVA and brain surgeries the family was advised ER to have patient evaluated

## 2021-03-24 NOTE — TELEPHONE ENCOUNTER
Her heart monitor did not show any major harmful arrhythmia  I am sorry to hear about ER visit   There was no afib seen

## 2021-03-24 NOTE — ED PROVIDER NOTES
History  Chief Complaint   Patient presents with    Headache     c/o frontal headache, dizziness, increased confusion x 3 days  Patient is an 40-year-old female with a history of hemorrhagic stroke, chronic kidney disease, hypothyroidism, hypertension, CHF who presents with headache  Patient states that she has had an intermittent headache for the past 3-4 days  She also describes generalized weakness and fatigue  She is still able to ambulate with her cane and perform activities of daily living  However she feels more sleepy than normal   She also describes periods of forgetfulness  Her daughter-in-law has not noticed any significant confusion or memory issues  Patient states that she has a history of a hemorrhagic stroke status post craniotomy  She made a full recovery and has no residual deficits from this stroke  She uses a cane because of left leg pain and weakness which is chronic  Patient denies any sick contacts  She has been vaccinated for COVID-19 with the 2nd vaccine received 1 month ago  Patient denies fever, chills, shortness of breath, cough, chest pain or other complaints  History provided by:  Patient  Headache  Pain location:  Frontal  Radiates to:  Does not radiate  Duration:  4 days  Timing:  Intermittent  Chronicity:  New  Similar to prior headaches: no    Ineffective treatments:  Acetaminophen  Associated symptoms: abdominal pain    Associated symptoms: no back pain, no cough, no diarrhea, no dizziness, no eye pain, no fever, no focal weakness, no nausea, no neck pain, no neck stiffness, no paresthesias, no photophobia, no seizures, no sore throat, no tingling, no vomiting and no weakness        Prior to Admission Medications   Prescriptions Last Dose Informant Patient Reported? Taking?    DULoxetine (CYMBALTA) 20 mg capsule More than a month at Unknown time Self No No   Sig: Take 1 capsule (20 mg total) by mouth daily   amLODIPine (NORVASC) 10 mg tablet 3/24/2021 at Unknown time Self No Yes   Sig: Take 1 tablet (10 mg total) by mouth daily   atorvastatin (LIPITOR) 40 mg tablet 3/23/2021 at Unknown time Self No Yes   Sig: Take 1 tablet (40 mg total) by mouth every evening   bumetanide (BUMEX) 2 mg tablet 3/24/2021 at Unknown time Self No Yes   Sig: Take 1 tablet (2 mg total) by mouth daily   gabapentin (NEURONTIN) 100 mg capsule  Self No No   Sig: Take 1 capsule (100 mg total) by mouth 3 (three) times a day   levothyroxine 88 mcg tablet 3/24/2021 at Unknown time Self No Yes   Sig: Take 1 tablet (88 mcg total) by mouth daily   losartan (COZAAR) 25 mg tablet 3/24/2021 at Unknown time Self No Yes   Sig: Take 2 tab in AM and 1 tab in PM   omeprazole (PriLOSEC) 20 mg delayed release capsule 3/24/2021 at Unknown time Self No Yes   Sig: Take 1 capsule (20 mg total) by mouth every morning   ondansetron (ZOFRAN) 4 mg tablet  Self No No   Sig: Take 1 tablet (4 mg total) by mouth every 8 (eight) hours as needed for nausea or vomiting   Patient not taking: Reported on 2021   spironolactone (ALDACTONE) 25 mg tablet 3/24/2021 at Unknown time Self No Yes   Sig: Take 0 5 tablets (12 5 mg total) by mouth daily      Facility-Administered Medications: None       Past Medical History:   Diagnosis Date    Anxiety     Arthritis     joints    Cataract     Disease of thyroid gland     Eczema     GERD (gastroesophageal reflux disease)     Gout     Heart failure ([address] )     Hepatitis     Hiatal hernia     Hypertension     Onychomycosis     last assessed: 16    Orthopnea     resolved: 16    Pseudogout of left wrist     Sleep apnea     wears CPAP    Stroke [facility])        Past Surgical History:   Procedure Laterality Date    ABDOMINAL SURGERY          BRAIN HEMATOMA EVACUATION Right 2020    Procedure: Right decompressive craniectomy and evacuation of intraparenchymal hematoma;   Surgeon: Dane Diamond MD;  [facility]: [facility];  Service: Neurosurgery    BREAST SURGERY Right     cyst removal    CARPAL TUNNEL RELEASE Left     CARPAL TUNNEL RELEASE Right     CATARACT EXTRACTION       SECTION  1960    x1    COLONOSCOPY N/A 2018    Procedure: COLONOSCOPY;  Surgeon: Anjana Del Castillo MD;  Location[facility]; Service: Gastroenterology    DILATION AND CURETTAGE OF UTERUS  1967    EYE SURGERY Left 2006    cataracts    HERNIA REPAIR      umbilical hernia    INCISIONAL HERNIA REPAIR      incarcerated    KNEE ARTHROSCOPY Right     NC REPLACE SKULL PLATE/FLAP Right 9249    Procedure: right frontotemporal replacement cranioplasty;  Surgeon: Odalys Castellanos MD;  Location:  MAIN OR;  Service: Neurosurgery    NC XCAPSL CTRC RMVL INSJ IO LENS PROSTH W/O ECP Right 3/27/2017    Procedure: EXTRACTION EXTRACAPSULAR CATARACT PHACO INTRAOCULAR LENS (IOL); Surgeon: Cecilio Celaya MD;  [facility] MAIN OR;  Service: Ophthalmology       Family History   Problem Relation Age of Onset    Diabetes Mother     Coronary artery disease Mother     Arthritis Mother     Hypertension Mother     Hypertension Father     Diabetes Brother     Diabetes Son     Arthritis Family      I have reviewed and agree with the history as documented  E-Cigarette/Vaping    E-Cigarette Use Never User      E-Cigarette/Vaping Substances     Social History     Tobacco Use    Smoking status: Never Smoker    Smokeless tobacco: Never Used   Substance Use Topics    Alcohol use: Yes     Alcohol/week: 0 0 standard drinks     Frequency: Monthly or less     Drinks per session: 1 or 2     Binge frequency: Never     Comment: Rare   Drug use: Never       Review of Systems   Constitutional: Negative for chills, diaphoresis and fever  HENT: Negative for nosebleeds, sore throat and trouble swallowing  Eyes: Negative for photophobia, pain and visual disturbance  Respiratory: Negative for cough, chest tightness and shortness of breath      Cardiovascular: Negative for chest pain, palpitations and leg swelling  Gastrointestinal: Positive for abdominal pain  Negative for constipation, diarrhea, nausea and vomiting  Endocrine: Negative for polydipsia and polyuria  Genitourinary: Negative for difficulty urinating, dysuria, hematuria, pelvic pain, vaginal bleeding and vaginal discharge  Musculoskeletal: Negative for back pain, neck pain and neck stiffness  Skin: Negative for pallor and rash  Neurological: Positive for headaches  Negative for dizziness, focal weakness, seizures, weakness, light-headedness and paresthesias  All other systems reviewed and are negative  Physical Exam  Physical Exam  Vitals signs and nursing note reviewed  Constitutional:       General: She is not in acute distress  Appearance: She is well-developed  HENT:      Head: Normocephalic and atraumatic  Eyes:      Pupils: Pupils are equal, round, and reactive to light  Neck:      Musculoskeletal: Normal range of motion and neck supple  Cardiovascular:      Rate and Rhythm: Regular rhythm  Bradycardia present  Pulses: Normal pulses  Heart sounds: Normal heart sounds  Pulmonary:      Effort: Pulmonary effort is normal  No respiratory distress  Breath sounds: Normal breath sounds  Abdominal:      General: There is no distension  Palpations: Abdomen is soft  Abdomen is not rigid  Tenderness: There is no abdominal tenderness  There is no guarding or rebound  Musculoskeletal: Normal range of motion  General: No tenderness  Lymphadenopathy:      Cervical: No cervical adenopathy  Skin:     General: Skin is warm and dry  Capillary Refill: Capillary refill takes less than 2 seconds  Neurological:      Mental Status: She is alert and oriented to person, place, and time  Cranial Nerves: No cranial nerve deficit  Sensory: No sensory deficit  Motor: Weakness (4+/5 strength in LLE which is baseline) present        Comments: Cerebellar exam normal   Speech fluent  Visual fields intact           Vital Signs  ED Triage Vitals [03/24/21 1045]   Temperature Pulse Respirations Blood Pressure SpO2   98 8 °F (37 1 °C) 59 20 145/70 97 %      Temp Source Heart Rate Source Patient Position - Orthostatic VS BP Location FiO2 (%)   Oral Monitor Lying Right arm --      Pain Score       6           Vitals:    03/24/21 1500 03/24/21 1530 03/24/21 1635 03/24/21 2214   BP: 169/65 143/64 148/65 130/54   Pulse: 58 62 65 57   Patient Position - Orthostatic VS:   Lying Lying         Visual Acuity  Visual Acuity      Most Recent Value   L Pupil Size (mm)  3   R Pupil Size (mm)  2   L Pupil Shape  Round   R Pupil Shape  Round          ED Medications  Medications   atorvastatin (LIPITOR) tablet 40 mg (40 mg Oral Given 3/24/21 1714)   DULoxetine (CYMBALTA) delayed release capsule 20 mg (20 mg Oral Refused 3/24/21 1712)   gabapentin (NEURONTIN) capsule 100 mg (100 mg Oral Given 3/24/21 2123)   levothyroxine tablet 88 mcg (88 mcg Oral Given 3/25/21 0540)   pantoprazole (PROTONIX) EC tablet 40 mg (has no administration in time range)   senna (SENOKOT) tablet 8 6 mg (8 6 mg Oral Given 3/24/21 1541)   acetaminophen (TYLENOL) tablet 650 mg (has no administration in time range)   ondansetron (ZOFRAN) injection 4 mg (has no administration in time range)   enoxaparin (LOVENOX) subcutaneous injection 40 mg (40 mg Subcutaneous Given 3/24/21 1715)   nitroglycerin (NITROSTAT) SL tablet 0 4 mg (has no administration in time range)   amLODIPine (NORVASC) tablet 10 mg (has no administration in time range)   losartan (COZAAR) tablet 50 mg (has no administration in time range)   losartan (COZAAR) tablet 25 mg (25 mg Oral Given 3/24/21 2123)   bumetanide (BUMEX) tablet 2 mg (has no administration in time range)   aluminum-magnesium hydroxide-simethicone (MYLANTA) oral suspension 30 mL (30 mL Oral Given 3/24/21 1335)       Diagnostic Studies  Results Reviewed     Procedure Component Value Units Date/Time    TSH, 3rd generation with Free T4 reflex [[id number]]  (Normal) Collected: 03/25/21 0516    Lab Status: Final result Specimen: Blood from Arm, Left Updated: 03/25/21 0655     TSH 3RD GENERATON 1 795 uIU/mL     Narrative:      Patients undergoing fluorescein dye angiography may retain small amounts of fluorescein in the body for 48-72 hours post procedure  Samples containing fluorescein can produce falsely depressed TSH values  If the patient had this procedure,a specimen should be resubmitted post fluorescein clearance        Basic metabolic panel [[id number]]  (Abnormal) Collected: 03/25/21 0516    Lab Status: Final result Specimen: Blood from Arm, Left Updated: 03/25/21 0655     Sodium 145 mmol/L      Potassium 3 8 mmol/L      Chloride 107 mmol/L      CO2 28 mmol/L      ANION GAP 10 mmol/L      BUN 34 mg/dL      Creatinine 1 13 mg/dL      Glucose 92 mg/dL      Glucose, Fasting 92 mg/dL      Calcium 9 5 mg/dL      eGFR 46 ml/min/1 73sq m     Narrative:      [place] guidelines for Chronic Kidney Disease (CKD):     Stage 1 with normal or high GFR (GFR > 90 mL/min/1 73 square meters)    Stage 2 Mild CKD (GFR = 60-89 mL/min/1 73 square meters)    Stage 3A Moderate CKD (GFR = 45-59 mL/min/1 73 square meters)    Stage 3B Moderate CKD (GFR = 30-44 mL/min/1 73 square meters)    Stage 4 Severe CKD (GFR = 15-29 mL/min/1 73 square meters)    Stage 5 End Stage CKD (GFR <15 mL/min/1 73 square meters)  Note: GFR calculation is accurate only with a steady state creatinine    Magnesium [[id number]]  (Normal) Collected: 03/25/21 0516    Lab Status: Final result Specimen: Blood from Arm, Left Updated: 03/25/21 0655     Magnesium 1 8 mg/dL     CBC (With Platelets) [[id number]]  (Abnormal) Collected: 03/25/21 0516    Lab Status: Final result Specimen: Blood from Arm, Left Updated: 03/25/21 0611     WBC 5 78 Thousand/uL      RBC 4 10 Million/uL      Hemoglobin 11 4 g/dL      Hematocrit 36 0 %      MCV 88 fL      MCH 27 8 pg      MCHC 31 7 g/dL      RDW 14 4 %      Platelets 632 Thousands/uL      MPV 11 3 fL     Troponin I [[id number]]  (Abnormal) Collected: 03/24/21 1738    Lab Status: Final result Specimen: Blood from Arm, Left Updated: 03/24/21 1805     Troponin I 0 07 ng/mL     Platelet count [[id number]]  (Normal) Collected: 03/24/21 1738    Lab Status: Final result Specimen: Blood from Arm, Left Updated: 03/24/21 1744     Platelets 827 Thousands/uL      MPV 10 9 fL     Urine Microscopic [[id number]]  (Normal) Collected: 03/24/21 1149    Lab Status: Final result Specimen: Urine, Clean Catch Updated: 03/24/21 1247     RBC, UA None Seen /hpf      WBC, UA 1-2 /hpf      Epithelial Cells None Seen /hpf      Bacteria, UA None Seen /hpf     UA (URINE) with reflex to Scope [[id number]]  (Abnormal) Collected: 03/24/21 1149    Lab Status: Final result Specimen: Urine, Clean Catch Updated: 03/24/21 1232     Color, UA Light Yellow     Clarity, UA Clear     Specific Gravity, UA <=1 005     pH, UA 5 5     Leukocytes, UA Small     Nitrite, UA Negative     Protein, UA Negative mg/dl      Glucose, UA Negative mg/dl      Ketones, UA Negative mg/dl      Urobilinogen, UA 0 2 E U /dl      Bilirubin, UA Negative     Blood, UA Negative    TSH [[id number]]  (Normal) Collected: 03/24/21 1149    Lab Status: Final result Specimen: Blood from Arm, Right Updated: 03/24/21 1225     TSH 3RD GENERATON 1 847 uIU/mL     Narrative:      Patients undergoing fluorescein dye angiography may retain small amounts of fluorescein in the body for 48-72 hours post procedure  Samples containing fluorescein can produce falsely depressed TSH values  If the patient had this procedure,a specimen should be resubmitted post fluorescein clearance        Troponin I [[id number]]  (Abnormal) Collected: 03/24/21 1149    Lab Status: Final result Specimen: Blood from Arm, Right Updated: 03/24/21 1224     Troponin I 0 06 ng/mL     Comprehensive metabolic panel [[id number]]  (Abnormal) Collected: 03/24/21 1149    Lab Status: Final result Specimen: Blood from Arm, Right Updated: 03/24/21 1220     Sodium 146 mmol/L      Potassium 4 1 mmol/L      Chloride 107 mmol/L      CO2 30 mmol/L      ANION GAP 9 mmol/L      BUN 33 mg/dL      Creatinine 1 17 mg/dL      Glucose 92 mg/dL      Calcium 9 1 mg/dL      AST 15 U/L      ALT 22 U/L      Alkaline Phosphatase 128 U/L      Total Protein 7 5 g/dL      Albumin 3 6 g/dL      Total Bilirubin 0 53 mg/dL      eGFR 44 ml/min/1 73sq m     Narrative:      [facility] guidelines for Chronic Kidney Disease (CKD):     Stage 1 with normal or high GFR (GFR > 90 mL/min/1 73 square meters)    Stage 2 Mild CKD (GFR = 60-89 mL/min/1 73 square meters)    Stage 3A Moderate CKD (GFR = 45-59 mL/min/1 73 square meters)    Stage 3B Moderate CKD (GFR = 30-44 mL/min/1 73 square meters)    Stage 4 Severe CKD (GFR = 15-29 mL/min/1 73 square meters)    Stage 5 End Stage CKD (GFR <15 mL/min/1 73 square meters)  Note: GFR calculation is accurate only with a steady state creatinine    CBC and differential [[id number]]  (Abnormal) Collected: 03/24/21 1149    Lab Status: Final result Specimen: Blood from Arm, Right Updated: 03/24/21 1203     WBC 5 48 Thousand/uL      RBC 4 41 Million/uL      Hemoglobin 12 1 g/dL      Hematocrit 38 6 %      MCV 88 fL      MCH 27 4 pg      MCHC 31 3 g/dL      RDW 14 3 %      MPV 11 1 fL      Platelets 646 Thousands/uL      nRBC 0 /100 WBCs      Neutrophils Relative 72 %      Immat GRANS % 0 %      Lymphocytes Relative 16 %      Monocytes Relative 9 %      Eosinophils Relative 2 %      Basophils Relative 1 %      Neutrophils Absolute 3 88 Thousands/µL      Immature Grans Absolute 0 02 Thousand/uL      Lymphocytes Absolute 0 90 Thousands/µL      Monocytes Absolute 0 51 Thousand/µL      Eosinophils Absolute 0 13 Thousand/µL      Basophils Absolute 0 04 Thousands/µL                  XR chest 2 views   ED Interpretation by Don Chaves DO (03/24 1203)   No infiltrates  Borderline cardiomegaly      Final Result by Santiago Sánchez MD (03/24 1313)   No acute cardiopulmonary disease  Findings are stable               Workstation performed: [id number]         CT head without contrast   Final Result by Felicity Kunz MD (03/24 1228)      No acute intracranial pathology  Surgical changes of prior right-sided craniotomy and cranioplasty with bone flap replacement  Unchanged right temporal lobe encephalomalacia and gliosis  Workstation performed: [id number]                    Procedures  ECG 12 Lead Documentation Only    Date/Time: 3/24/2021 12:07 PM  Performed by: Don Chaves DO  Authorized by: Don Chaves DO     ECG reviewed by me, the ED Provider: yes    Patient location:  ED  Previous ECG:     Previous ECG:  Compared to current    Similarity:  Changes noted    Comparison to cardiac monitor: Yes    Comments:      Normal sinus rhythm at a rate of 62 beats right branch  Low voltage QRS  Right axis deviation  T-wave inversions lead 3  Frequent PACs  PACs new from previous EKG from 04/06/2020  ECG 12 Lead Documentation Only    Date/Time: 3/24/2021 1:50 PM  Performed by: Don Chaves DO  Authorized by: Don Chaves DO     ECG reviewed by me, the ED Provider: yes    Patient location:  ED  Previous ECG:     Previous ECG:  Compared to current    Similarity:  No change    Comparison to cardiac monitor: Yes    Comments:      Normal sinus rhythm at a rate of 56 beats per minute  Right bundle branch block  Low voltage QRS  T-wave inversions in lead 3  Frequent PACs  Unchanged from previous EKG from today at 12:01 p m  Janice Ordaz                ED Course             HEART Risk Score      Most Recent Value   Heart Score Risk Calculator   History  0 Filed at: 03/25/2021 0704   ECG  1 Filed at: 03/25/2021 9853   Age  2 Filed at: 03/25/2021 1797   Risk Factors  2 Filed at: 03/25/2021 0704   Troponin  1 Filed at: 03/25/2021 6094   HEART Score  6 Filed at: 03/25/2021 6046                                    MDM  Number of Diagnoses or Management Options  Chest pain, unspecified type: new and requires workup  Elevated troponin: new and requires workup  Diagnosis management comments: Patient presents with multiple complaints including episodes of chest pain and dizziness  EKG does not reveal acute ischemia  However initial troponin is mildly elevated  Patient is unable to take aspirin due to a history of hemorrhagic stroke  Will hospitalize for serial troponins  Patient is stable at this time         Amount and/or Complexity of Data Reviewed  Clinical lab tests: ordered and reviewed  Tests in the radiology section of CPT®: ordered and reviewed  Tests in the medicine section of CPT®: ordered and reviewed  Review and summarize past medical records: yes  Discuss the patient with other providers: yes  Independent visualization of images, tracings, or specimens: yes    Risk of Complications, Morbidity, and/or Mortality  Presenting problems: high  Diagnostic procedures: moderate  Management options: moderate    Patient Progress  Patient progress: stable      Disposition  Final diagnoses:   Chest pain, unspecified type   Elevated troponin     Time reflects when diagnosis was documented in both MDM as applicable and the Disposition within this note     Time User Action Codes Description Comment    3/24/2021  1:37 PM Kamila LONG Add [R07 9] Chest pain, unspecified type     3/24/2021  1:38 PM Aruna Devoid Add [R77 8] Elevated troponin     3/24/2021  2:27 PM Sudhir Caldwell Add [E71 19] Chronic diastolic (congestive) heart failure ([facility] [address] )     3/24/2021  2:28 PM Sudhir Caldwell Add [R41 82] AMS (altered mental status)       ED Disposition     ED Disposition Condition Date/Time Comment    Admit Stable Wed Mar 24, 2021  1:37 PM Case was discussed with AL and the patient's admission status was agreed to be Admission Status: observation status to the service of Dr Terrell Mercer           Follow-up Information    None         Current Discharge Medication List      CONTINUE these medications which have NOT CHANGED    Details   amLODIPine (NORVASC) 10 mg tablet Take 1 tablet (10 mg total) by mouth daily  Qty: 90 tablet, Refills: 1    Associated Diagnoses: Benign essential hypertension      atorvastatin (LIPITOR) 40 mg tablet Take 1 tablet (40 mg total) by mouth every evening  Qty: 90 tablet, Refills: 1    Associated Diagnoses: Hyperlipidemia, unspecified hyperlipidemia type      bumetanide (BUMEX) 2 mg tablet Take 1 tablet (2 mg total) by mouth daily  Qty: 135 tablet, Refills: 3    Associated Diagnoses: Acute on chronic diastolic congestive heart failure (HCC)      levothyroxine 88 mcg tablet Take 1 tablet (88 mcg total) by mouth daily  Qty: 90 tablet, Refills: 1    Associated Diagnoses: Hypothyroidism, unspecified type      losartan (COZAAR) 25 mg tablet Take 2 tab in AM and 1 tab in PM  Qty: 180 tablet, Refills: 3    Comments: **Patient requests 90 days supply**  Associated Diagnoses: Benign essential HTN      omeprazole (PriLOSEC) 20 mg delayed release capsule Take 1 capsule (20 mg total) by mouth every morning  Qty: 90 capsule, Refills: 1    Associated Diagnoses: Gastroesophageal reflux disease without esophagitis      spironolactone (ALDACTONE) 25 mg tablet Take 0 5 tablets (12 5 mg total) by mouth daily  Qty: 45 tablet, Refills: 3    Associated Diagnoses: Benign essential HTN      DULoxetine (CYMBALTA) 20 mg capsule Take 1 capsule (20 mg total) by mouth daily  Qty: 30 capsule, Refills: 5    Associated Diagnoses: Depressed mood      gabapentin (NEURONTIN) 100 mg capsule Take 1 capsule (100 mg total) by mouth 3 (three) times a day  Qty: 90 capsule, Refills: 0    Associated Diagnoses: Herpes zoster without complication      ondansetron (ZOFRAN) 4 mg tablet Take 1 tablet (4 mg total) by mouth every 8 (eight) hours as needed for nausea or vomiting  Qty: 20 tablet, Refills: 0    Associated Diagnoses: Nausea           No discharge procedures on file      PDMP Review       Value Time User    PDMP Reviewed  Yes 3/24/2021  2:21 PM Jairo Norman MD          ED Provider  Electronically Signed by           Blank Sosa DO  03/25/21 7332

## 2021-03-24 NOTE — ASSESSMENT & PLAN NOTE
· Mildly elevated troponin at 0 06   · Rule out acute coronary syndrome  · NSTEMI type 1 versus NSTEMI type 2, with patient having history of palpitations; sinus bradycardia with PACs on EKG  · Not given aspirin in the emergency room due to history of intracerebral hemorrhage, status post surgery  · Cardiology consult (cardiologist informed)  · Telemetry  · Continue trending troponins  · Nitroglycerin p r n     · Oxygen p r n  Owen Sanabria · Pain control p r n  Owen Sanabria

## 2021-03-25 ENCOUNTER — TRANSITIONAL CARE MANAGEMENT (OUTPATIENT)
Dept: FAMILY MEDICINE CLINIC | Facility: CLINIC | Age: 81
End: 2021-03-25

## 2021-03-25 ENCOUNTER — TELEPHONE (OUTPATIENT)
Dept: CARDIOLOGY CLINIC | Facility: CLINIC | Age: 81
End: 2021-03-25

## 2021-03-25 VITALS
SYSTOLIC BLOOD PRESSURE: 127 MMHG | WEIGHT: 174.6 LBS | TEMPERATURE: 98.1 F | RESPIRATION RATE: 18 BRPM | DIASTOLIC BLOOD PRESSURE: 58 MMHG | BODY MASS INDEX: 32.97 KG/M2 | HEIGHT: 61 IN | OXYGEN SATURATION: 97 % | HEART RATE: 65 BPM

## 2021-03-25 LAB
ANION GAP SERPL CALCULATED.3IONS-SCNC: 10 MMOL/L (ref 4–13)
BUN SERPL-MCNC: 34 MG/DL (ref 5–25)
CALCIUM SERPL-MCNC: 9.5 MG/DL (ref 8.3–10.1)
CHLORIDE SERPL-SCNC: 107 MMOL/L (ref 100–108)
CO2 SERPL-SCNC: 28 MMOL/L (ref 21–32)
CREAT SERPL-MCNC: 1.13 MG/DL (ref 0.6–1.3)
ERYTHROCYTE [DISTWIDTH] IN BLOOD BY AUTOMATED COUNT: 14.4 % (ref 11.6–15.1)
GFR SERPL CREATININE-BSD FRML MDRD: 46 ML/MIN/1.73SQ M
GLUCOSE P FAST SERPL-MCNC: 92 MG/DL (ref 65–99)
GLUCOSE SERPL-MCNC: 92 MG/DL (ref 65–140)
HCT VFR BLD AUTO: 36 % (ref 34.8–46.1)
HGB BLD-MCNC: 11.4 G/DL (ref 11.5–15.4)
MAGNESIUM SERPL-MCNC: 1.8 MG/DL (ref 1.6–2.6)
MCH RBC QN AUTO: 27.8 PG (ref 26.8–34.3)
MCHC RBC AUTO-ENTMCNC: 31.7 G/DL (ref 31.4–37.4)
MCV RBC AUTO: 88 FL (ref 82–98)
PLATELET # BLD AUTO: 171 THOUSANDS/UL (ref 149–390)
PMV BLD AUTO: 11.3 FL (ref 8.9–12.7)
POTASSIUM SERPL-SCNC: 3.8 MMOL/L (ref 3.5–5.3)
RBC # BLD AUTO: 4.1 MILLION/UL (ref 3.81–5.12)
SODIUM SERPL-SCNC: 145 MMOL/L (ref 136–145)
TROPONIN I SERPL-MCNC: 0.07 NG/ML
TSH SERPL DL<=0.05 MIU/L-ACNC: 1.79 UIU/ML (ref 0.36–3.74)
WBC # BLD AUTO: 5.78 THOUSAND/UL (ref 4.31–10.16)

## 2021-03-25 PROCEDURE — 83735 ASSAY OF MAGNESIUM: CPT | Performed by: INTERNAL MEDICINE

## 2021-03-25 PROCEDURE — 97163 PT EVAL HIGH COMPLEX 45 MIN: CPT

## 2021-03-25 PROCEDURE — 97167 OT EVAL HIGH COMPLEX 60 MIN: CPT

## 2021-03-25 PROCEDURE — 84443 ASSAY THYROID STIM HORMONE: CPT | Performed by: INTERNAL MEDICINE

## 2021-03-25 PROCEDURE — 85027 COMPLETE CBC AUTOMATED: CPT | Performed by: INTERNAL MEDICINE

## 2021-03-25 PROCEDURE — 84484 ASSAY OF TROPONIN QUANT: CPT | Performed by: INTERNAL MEDICINE

## 2021-03-25 PROCEDURE — 80048 BASIC METABOLIC PNL TOTAL CA: CPT | Performed by: INTERNAL MEDICINE

## 2021-03-25 PROCEDURE — 99217 PR OBSERVATION CARE DISCHARGE MANAGEMENT: CPT | Performed by: INTERNAL MEDICINE

## 2021-03-25 RX ADMIN — AMLODIPINE BESYLATE 10 MG: 10 TABLET ORAL at 08:34

## 2021-03-25 RX ADMIN — BUMETANIDE 2 MG: 1 TABLET ORAL at 08:34

## 2021-03-25 RX ADMIN — DULOXETINE HYDROCHLORIDE 20 MG: 20 CAPSULE, DELAYED RELEASE ORAL at 08:34

## 2021-03-25 RX ADMIN — SENNOSIDES 8.6 MG: 8.6 TABLET, FILM COATED ORAL at 08:35

## 2021-03-25 RX ADMIN — LOSARTAN POTASSIUM 50 MG: 50 TABLET, FILM COATED ORAL at 08:35

## 2021-03-25 RX ADMIN — GABAPENTIN 100 MG: 100 CAPSULE ORAL at 08:35

## 2021-03-25 RX ADMIN — LEVOTHYROXINE SODIUM 88 MCG: 88 TABLET ORAL at 05:40

## 2021-03-25 RX ADMIN — ENOXAPARIN SODIUM 40 MG: 40 INJECTION SUBCUTANEOUS at 08:35

## 2021-03-25 RX ADMIN — PANTOPRAZOLE SODIUM 40 MG: 40 TABLET, DELAYED RELEASE ORAL at 08:35

## 2021-03-25 NOTE — DISCHARGE INSTRUCTIONS
Chest Pain   WHAT YOU NEED TO KNOW:   Chest pain can be caused by a range of conditions, from not serious to life-threatening  Chest pain can be a symptom of a digestive problem, such as acid reflux or a stomach ulcer  An anxiety attack or a strong emotion, such as anger, can also cause chest pain  Infection, inflammation, or a fracture in the bones or cartilage in your chest can cause pain or discomfort  Sometimes chest pain or pressure is caused by poor blood flow to your heart (angina)  Chest pain may also be caused by life-threatening conditions such as a heart attack or blood clot in your lungs  DISCHARGE INSTRUCTIONS:   Call 911 if:   · You have any of the following signs of a heart attack:      ? Squeezing, pressure, or pain in your chest    ? You may  also have any of the following:     § Discomfort or pain in your back, neck, jaw, stomach, or arm    § Shortness of breath    § Nausea or vomiting    § Lightheadedness or a sudden cold sweat      Return to the emergency department if:   · You have chest discomfort that gets worse, even with medicine  · You cough or vomit blood  · Your bowel movements are black or bloody  · You cannot stop vomiting, or it hurts to swallow  Contact your healthcare provider if:   · You have questions or concerns about your condition or care  Medicines:   · Medicines  may be given to treat the cause of your chest pain  Examples include pain medicine, anxiety medicine, or medicines to increase blood flow to your heart  · Do not take certain medicines without asking your healthcare provider first   These include NSAIDs, herbal or vitamin supplements, or hormones (estrogen or progestin)  · Take your medicine as directed  Contact your healthcare provider if you think your medicine is not helping or if you have side effects  Tell him or her if you are allergic to any medicine  Keep a list of the medicines, vitamins, and herbs you take   Include the amounts, and when and why you take them  Bring the list or the pill bottles to follow-up visits  Carry your medicine list with you in case of an emergency  Follow up with your healthcare provider within 72 hours, or as directed: You may need to return for more tests to find the cause of your chest pain  You may be referred to a specialist, such as a cardiologist or gastroenterologist  Write down your questions so you remember to ask them during your visits  Healthy living tips: The following are general healthy guidelines  If your chest pain is caused by a heart problem, your healthcare provider will give you specific guidelines to follow  · Do not smoke  Nicotine and other chemicals in cigarettes and cigars can cause lung and heart damage  Ask your healthcare provider for information if you currently smoke and need help to quit  E-cigarettes or smokeless tobacco still contain nicotine  Talk to your healthcare provider before you use these products  · Eat a variety of healthy, low-fat, low-salt foods  Healthy foods include fruits, vegetables, whole-grain breads, low-fat dairy products, beans, lean meats, and fish  Ask for more information about a heart healthy diet  · Drink plenty of water every day  Your body is made of mostly water  Water helps your body to control your temperature and blood pressure  Ask your healthcare provider how much water you should drink every day  · Ask about activity  Your healthcare provider will tell you which activities to limit or avoid  Ask when you can drive, return to work, and have sex  Ask about the best exercise plan for you  · Maintain a healthy weight  Ask your healthcare provider how much you should weigh  Ask him or her to help you create a weight loss plan if you are overweight  · Get the flu and pneumonia vaccines  All adults should get the influenza (flu) vaccine  Get it every year as soon as it becomes available   The pneumococcal vaccine is given to adults aged 72 years or older  The vaccine is given every 5 years to prevent pneumococcal disease, such as pneumonia  If you have a stent:   · Carry your stent card with you at all times  · Let all healthcare providers know that you have a stent  © Copyright [address] Information is for End User's use only and may not be sold, redistributed or otherwise used for commercial purposes  All illustrations and images included in CareNotes® are the copyrighted property of [employer]  or [facility] Melonie Pabon   The above information is an  only  It is not intended as medical advice for individual conditions or treatments  Talk to your doctor, nurse or pharmacist before following any medical regimen to see if it is safe and effective for you

## 2021-03-25 NOTE — CASE MANAGEMENT
LOS: 1 DAYS  PATIENT IS NOT A BUNDLE  PATIENT IS NOT A READMISSION  UNPLANNED RISK OF READMISSION: N/A AS PATIENT IS OBS     CM met with patient at bedside to introduce self, explain role, complete CM assessment, and discuss DC planning  Patient alert and oriented and sitting in recliner  [facility]ives [facility]: [facility]  Lives with: son, daughter in law and grandson  Supports: family  Daughter in law is retired and home with patient all the time and her son is working from home right now  Home Type & Entry: single story home, 1 KUMAR   DME: does not utilize any at this time but has a RW and cane at home  ADLs: self  VNA history: none    STR history: history at [facility] in 2020, OP therapy as well  Pharmacy Preference & Coverage: maintenance medications are filled through [employer], one time/immediate needs are obtained at [employer]  Has Rx plan and no barriers to obtaining/affording medications  Mental Health/Drug/ETOH history: none  Patient reports situation depression after her spouse passed but did not need to seek treatment and is doing well  POA/AD/LW: identifies her son Oleksandr Pate as POA; has AD as well  Income/Employment: SSI  Transportation: family drives in the community  PCP: Deward Hodgkins, normally sees her annually but would like to start scheduling more frequently  Transport Home: daughter in law Adebayo     DCP: PT/OT evaluated patient and do not feel she has any skilled therapy needs at this time  No CM needs identified during CM assessment though CM Dept will continue to follow through discharge  CM reviewed discharge planning process including the following: identifying caregivers at home, preference for d/c planning needs, availability of treatment team to discuss questions or concerns patient and/or family may have regarding diagnosis, plan of care, old or new medications and discharge planning   CM will continue to follow for care coordination and update assessment as appropriate

## 2021-03-25 NOTE — OCCUPATIONAL THERAPY NOTE
Occupational Therapy Evaluation     Patient Name: Tigist Fermin  TKJIE'E Date: 3/25/2021  Problem List  Principal Problem:    AMS (altered mental status)  Active Problems:    Chronic diastolic (congestive) heart failure (HCC)    Chest pain    Hypernatremia    Past Medical History  Past Medical History:   Diagnosis Date    Anxiety     Arthritis     joints    Cataract     Disease of thyroid gland     Eczema     GERD (gastroesophageal reflux disease)     Gout     Heart failure ([address] )     Hepatitis     Hiatal hernia     Hypertension     Onychomycosis     last assessed: 16    Orthopnea     resolved: 16    Pseudogout of left wrist     Sleep apnea     wears CPAP    Stroke [facility])      Past Surgical History  Past Surgical History:   Procedure Laterality Date    ABDOMINAL SURGERY          BRAIN HEMATOMA EVACUATION Right 2020    Procedure: Right decompressive craniectomy and evacuation of intraparenchymal hematoma; Surgeon: Zoya Coleman MD;  [facility];  Service: Neurosurgery    BREAST SURGERY Right     cyst removal    CARPAL TUNNEL RELEASE Left     CARPAL TUNNEL RELEASE Right     CATARACT EXTRACTION       SECTION  1960    x1    COLONOSCOPY N/A 2018    Procedure: COLONOSCOPY;  Surgeon: Anjali Panda MD;  Location: [place] [address] GI LAB; Service: Gastroenterology    DILATION AND CURETTAGE OF UTERUS  1967    EYE SURGERY Left 2006    cataracts    HERNIA REPAIR      umbilical hernia    INCISIONAL HERNIA REPAIR      incarcerated    KNEE ARTHROSCOPY Right     IN REPLACE SKULL PLATE/FLAP Right 6916    Procedure: right frontotemporal replacement cranioplasty;  Surgeon: Zoya Coleman MD;  Location:  MAIN OR;  Service: Neurosurgery    IN XCAPSL CTRC RMVL INSJ IO LENS PROSTH W/O ECP Right 3/27/2017    Procedure: EXTRACTION EXTRACAPSULAR CATARACT PHACO INTRAOCULAR LENS (IOL);   Surgeon: Oren Cruz MD;  Location: [facility] MAIN OR;  Service: Ophthalmology        03/25/21 1015   OT Last Visit   OT Visit Date 03/25/21  (Thursday)   Note Type   Note type Evaluation   Restrictions/Precautions   Weight Bearing Precautions Per Order No   Other Precautions Cognitive;Telemetry   Pain Assessment   Pain Assessment Tool Pain Assessment not indicated - pt denies pain   Pain Score No Pain   Home Living   Type of Home House   Home Layout One level  (1 small KUMAR)   Bathroom Shower/Tub Walk-in shower   Bathroom Toilet Standard   Bathroom Equipment Grab bars in shower; Shower chair   [address] Cane;Walker;Grab bars  (pt reports using cane or no AD)   Additional Comments Pt reports living w/ son, daughter in law and 24yo grandson in  [address] [facility]   Prior Function   Level of [place] Independent with ADLs and functional mobility   Lives With Family;Son;Other (Comment)  (daughter in law, grandson, dog)   Receives Help From Family   ADL Assistance Independent   IADLs Needs assistance   Falls in the last 6 months 0   Vocational Retired   Comments Pt reports I w/ ADL and uses cane as needed for functional mobility  Pt reports managing medications and paying her own bills online   Pt reports that her MD told her she can drive short distances but family does not allow pt   Lifestyle   Autonomy Pt reports I w/ ADL using cane as needed for functional mobility   Reciprocal Relationships Pt reports supportive family and rarely home alone   Service to Others Pt reports retired    Intrinsic Gratification Pt reports enjoying their dog, Kecia and cooking   ADL   Where Assessed Edge of bed  (vs OOB in chair post eval w/ needs met)   Eating Assistance 6  Modified independent   Eating Deficit Setup   Grooming Assistance 6  Modified Independent   Grooming Deficit Setup;Standing with assistive device   UB Bathing Assistance Unable to assess   LB Bathing Assistance Unable to assess   UB Dressing Assistance 6  Modified independent   UB Dressing Deficit Setup; Increased time to complete   LB Dressing Assistance 6  Modified independent   LB Dressing Deficit Setup; Increased time to complete   Toileting Assistance  Unable to assess   Bed Mobility   Supine to Sit 6  Modified independent   Additional items HOB elevated   Sit to Supine Unable to assess   Additional Comments Pt seated OOB in chair post eval w/ needs met, call bell in reach   Transfers   Sit to Stand 7  Independent   Stand to Sit 7  Independent   Additional Comments Pt able to complete sit <> stand w/ out UE  Attempted to completed 30 second sit to stand but pt had difficulty due to talkative  Functional Mobility   Functional Mobility 6  Modified independent   Additional Comments household / community distances w/ mod I using cane w/ out LOB   Additional items SPC   Balance   Static Sitting Good   Static Standing Good   Ambulatory Good   Activity Tolerance   Activity Tolerance Patient tolerated treatment well   Medical Staff Made Aware care coordination w/ PT, Alysha   Spoke to [employer] RoadwaysTonya   Nurse Made Aware per RNJessica Keepers appropriate to see pt   RUE Strength   RUE Overall Strength Within Functional Limits - able to perform ADL tasks with strength   LUE Assessment   LUE Assessment X   LUE Overall AROM   L Elbow Pronation ROM WFL but pt reports discomfort   L Elbow Supination ROM WFL but pt reports discomfort   LUE Strength   LUE Overall Strength Within Functional Limits - able to perform ADL tasks with strength;Due to pain  (able to complete ADL)   Hand Function   Gross Motor Coordination Functional   Fine Motor Coordination Functional   Sensation   Light Touch No apparent deficits   Sharp/Dull Not tested   Vision-Basic Assessment   Current Vision Wears glasses all the time   Perception   Inattention/Neglect Appears intact   Motor Planning Appears intact   Perseveration Not present  (easily distracted, talkative)   Cognition   Overall Cognitive Status Impaired  (limited recall, executive function)   Arousal/Participation Alert; Cooperative   Attention Attends with cues to redirect   Orientation Level Oriented X4   Memory Decreased recall of recent events;Decreased short term memory   Following Commands Follows multistep commands without difficulty   Comments Identified pt by full name and birthdate  Pt alert and oriented  Pt engged in Short Blessed Screen (SBT) w/ score of 10 indicating cognitive impairment  Pt able to report month and year  Pt able to immedicately recall a name and address but only able to recall first name after few minutes  Able to recall [facility] w/ cued recall  Pt able to stated time w/ in one hour w/ out looking at the clock  Pt able to count backwards from 20 to 1  Pt able to state months of year in reverse order w/ 1 omission  Cognition Assessment Tools   (Short Blessed Screen (SBT))   Score   (10)   Assessment   Limitation Decreased cognition;Decreased high-level ADLs   Assessment Pt is an 80 yo female [facility]  Pt presents w/ altered mental status and significant PMH impacting her occupational performance including anxiety, TN, Gout, arthritis, brain hematoma evacuation (2/11/2020), B carpal tunnel release, R frontotemporal cranioplasty (4/6/2020)  Pt reports living w/ son, daughter in law and 23 yo grandson in [address] [facility]  Pt reports I w/ ADL and light IADL using cane as needed for functional mobility  Personal factors impacting her performance including advanced age, difficulty managing steps, limited cognition  Upon eval, pt alert and oriented  Pt scored 10 on SBT indicating cognitive impairment  Pt demonstrated limited recall, memory and required cues to sustain attention  Pt completed bed mobility w/ mod I for HOB elevated  Pt completed UBD/LBD w/ mod I for + time, set -up  Pt completed sit <>stand independently and engaged in functional mobility using cane w/ mod I  Pt completing ADL at / near baseline level of I   Recommend discharge home to [facility] w/ social support and OPOT for [employer] [address] and functional cognitive skills  No additional acute OT needs at this time  Please re -consult if acute needs arise  D/C OT   Goals   Patient Goals Pt stated that she would like to go home   Recommendation   OT Discharge Recommendation Return to previous environment with social support  (OPOT, [employer] [address] )   Equipment Recommended Shower/Tub chair with back ($)  (continued use of shower chair)   AM-PAC Daily Activity Inpatient   Lower Body Dressing 4   Bathing 4   Toileting 4   Upper Body Dressing 4   Grooming 4   Eating 4   Daily Activity Raw Score 24   Daily Activity Standardized Score (Calc for Raw Score >=11) 57 54   AM-PAC Applied Cognition Inpatient   Following a Speech/Presentation 3   Understanding Ordinary Conversation 4   Taking Medications 4   Remembering Where Things Are Placed or Put Away 3   Remembering List of 4-5 Errands 3   Taking Care of Complicated Tasks 2   Applied Cognition Raw Score 19   Applied Cognition Standardized Score 39 77   Barthel Index   Feeding 10   Bathing 5   Grooming Score 5   Dressing Score 10   Bladder Score 10   Bowels Score 10   Toilet Use Score 10   Transfers (Bed/Chair) Score 15   Mobility (Level Surface) Score 10   Stairs Score 0   Barthel Index Score 85      The patient's raw score on the AM-PAC Daily Activity inpatient short form is 24, standardized score is 57 54, greater than 39 4  Patients at this level are likely to benefit from discharge to home  Please refer to the recommendation of the Occupational Therapist for safe discharge planning       AP Robertson/ETHAN

## 2021-03-25 NOTE — UTILIZATION REVIEW
Initial Clinical Review    Admission: Date/Time/Statement:   Admission Orders (From admission, onward)     Ordered        03/24/21 1338  Place in Observation  Once                   Orders Placed This Encounter   Procedures    Place in Observation     Standing Status:   Standing     Number of Occurrences:   1     Order Specific Question:   Level of Care     Answer:   Med Surg [16]     ED Arrival Information     Expected Arrival Acuity Means of Arrival Escorted By Service Admission Type    - 3/24/2021 10:28 Urgent Walk-In Family Member Hospitalist Urgent    Arrival Complaint    ams        Chief Complaint   Patient presents with    Headache     c/o frontal headache, dizziness, increased confusion x 3 days  Assessment/Plan:   Mrs Melchor Aguilar is an 80 yo female who presents to the ED from home with c/o headaches, poor memory, confusion  x 1 week, generalized weakness, occasional nausea, mild abd pain, palpitation and on a ZIO heart monitor and constipation but had BM the last 2 days  In the ED she had stabbing chest pain in L lower anterior chest region, no SOB  In the ED imaging showed no acute intracranial pathology  Initial troponin was elevated at 0 06 and monitor with SB with PACs  PMH: R intracerebral hemorrhage, 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, HTN, CHF, dyslipidemia and prediabetes  She is admitted to OBSERVATION status with Altered mental status - neuro checs, orthostatics, hold diuretics d/t possible dehydration, Neuro consult, PT/OT evals  Chest pain/ r/o ACS - cardio consult, Tele, trend troponins, PRN NTG, oxygen and analgesia  Hypernatremia - 146 - hold diuretics, trend labs  Chronic dCHF - compensated, hold bumex and Aldactone  3/24 Cardio Consult - Chronic diastolic heart failure, mitral regurgitation - at baseline, unless increase in troponins, no further testing  Likely non MI troponin elevation  3/25 troponin elevation continued to 0 07 and has not yet trended downward   No neuro deficits this morning  A&O x 4  ED Triage Vitals [03/24/21 1045]   Temperature Pulse Respirations Blood Pressure SpO2   98 8 °F (37 1 °C) 59 20 145/70 97 %      Temp Source Heart Rate Source Patient Position - Orthostatic VS BP Location FiO2 (%)   Oral Monitor Lying Right arm --      Pain Score       6          Wt Readings from Last 1 Encounters:   03/25/21 79 2 kg (174 lb 9 7 oz)     Additional Vital Signs:   03/25/21 0731  98 1 °F (36 7 °C)  65  18  127/58  83  97 %  None (Room air)  Sitting   03/24/21 2214  98 8 °F (37 1 °C)  57  18  130/54  78  97 %  None (Room air)  Lying   03/24/21 1635  98 7 °F (37 1 °C)  65  18  148/65  94  97 %  None (Room air)  Lying   03/24/21 1630  --  --  --  --  --  --  None (Room air)  --   03/24/21 1530  --  62  --  143/64  92  97 %  --  --   03/24/21 1500  --  58  --  169/65  94  97 %  --  --   03/24/21 1445  --  72  --  160/70  100  97 %  --  --   03/24/21 1443  --  63  20  160/70  --  97 %  None (Room air)  Lying   03/24/21 1115  --  --  --  --  --  --  None (Room air)  --   03/24/21 1100  --  52Abnormal   --  149/66  95  97 %  --  --     Pertinent Labs/Diagnostic Test Results:     3/24 CT head - No acute intracranial pathology  Surgical changes of prior right-sided craniotomy and cranioplasty with bone flap replacement  Unchanged right temporal lobe encephalomalacia and gliosis  3/24 CXR - no acute changes  3/24 ECG -   Normal sinus rhythm at a rate of 62 beats right branch  Low voltage QRS  Right axis deviation  T-wave inversions lead 3  Frequent PACs  PACs new from previous EKG from 04/06/2020          Results from last 7 days   Lab Units 03/25/21  0516 03/24/21  1738 03/24/21  1149 03/22/21  0727   WBC Thousand/uL 5 78  --  5 48 6 06   HEMOGLOBIN g/dL 11 4*  --  12 1 11 3*   HEMATOCRIT % 36 0  --  38 6 35 3   PLATELETS Thousands/uL 171 178 180 164   NEUTROS ABS Thousands/µL  --   --  3 88  --          Results from last 7 days   Lab Units 03/25/21  0516 03/24/21  1149 03/22/21  0727   SODIUM mmol/L 145 146* 143   POTASSIUM mmol/L 3 8 4 1 4 0   CHLORIDE mmol/L 107 107 109*   CO2 mmol/L 28 30 28   ANION GAP mmol/L 10 9 6   BUN mg/dL 34* 33* 35*   CREATININE mg/dL 1 13 1 17 0 99   EGFR ml/min/1 73sq m 46 44 54   CALCIUM mg/dL 9 5 9 1 9 3   MAGNESIUM mg/dL 1 8  --  2 1   PHOSPHORUS mg/dL  --   --  3 1     Results from last 7 days   Lab Units 03/24/21  1149   AST U/L 15   ALT U/L 22   ALK PHOS U/L 128*   TOTAL PROTEIN g/dL 7 5   ALBUMIN g/dL 3 6   TOTAL BILIRUBIN mg/dL 0 53         Results from last 7 days   Lab Units 03/25/21  0516 03/24/21  1149   GLUCOSE RANDOM mg/dL 92 92     Results from last 7 days   Lab Units 03/25/21  0516 03/24/21  2131 03/24/21  1738 03/24/21  1149   TROPONIN I ng/mL 0 07* 0 07* 0 07* 0 06*     Results from last 7 days   Lab Units 03/25/21  0516 03/24/21  1149   TSH 3RD GENERATON uIU/mL 1 795 1 847     Results from last 7 days   Lab Units 03/24/21  1149 03/22/21  0727   CLARITY UA  Clear Clear   COLOR UA  Light Yellow Yellow   SPEC GRAV UA  <=1 005 1 015   PH UA  5 5 6 0   GLUCOSE UA mg/dl Negative Negative   KETONES UA mg/dl Negative Negative   BLOOD UA  Negative Negative   PROTEIN UA mg/dl Negative Negative   NITRITE UA  Negative Negative   BILIRUBIN UA  Negative Negative   UROBILINOGEN UA E U /dl 0 2 0 2   LEUKOCYTES UA  Small* Small*   WBC UA /hpf 1-2 4-10*   RBC UA /hpf None Seen None Seen   BACTERIA UA /hpf None Seen Occasional   EPITHELIAL CELLS WET PREP /hpf None Seen Occasional   CREATININE UR mg/dL  --  78 1   PROTEIN UR mg/dL  --  9   PROT/CREAT RATIO UR   --  0 12*     ED Treatment:   Medication Administration from 03/24/2021 1028 to 03/24/2021 1559    Date/Time Order Dose Route Action   03/24/2021 1335 aluminum-magnesium hydroxide-simethicone (MYLANTA) oral suspension 30 mL 30 mL Oral Given   03/24/2021 1541 gabapentin (NEURONTIN) capsule 100 mg 100 mg Oral Given   03/24/2021 1541 senna (SENOKOT) tablet 8 6 mg 8 6 mg Oral Given        Past Medical History:   Diagnosis Date    Anxiety     Arthritis     joints    Cataract     Disease of thyroid gland     Eczema     GERD (gastroesophageal reflux disease)     Gout     Heart failure (HCC)     Hepatitis     Hiatal hernia     Hypertension     Onychomycosis     last assessed: 4/29/16    Orthopnea     resolved: 1/8/16    Pseudogout of left wrist     Sleep apnea     wears CPAP    Stroke [facility])      Present on Admission:   Chest pain   AMS (altered mental status)   Hypernatremia   Chronic diastolic (congestive) heart failure (HCC)    Admitting Diagnosis: Elevated troponin [P07 4]  Chronic diastolic (congestive) heart failure (HCC) [I50 32]  Chest pain, unspecified type [R07 9]  AMS (altered mental status) [R41 82]     Age/Sex: 79 y o  female     Admission Orders:  Scheduled Medications:  amLODIPine, 10 mg, Oral, Daily  atorvastatin, 40 mg, Oral, QPM  bumetanide, 2 mg, Oral, Daily  DULoxetine, 20 mg, Oral, Daily  enoxaparin, 40 mg, Subcutaneous, Daily  gabapentin, 100 mg, Oral, TID  levothyroxine, 88 mcg, Oral, Daily  losartan, 25 mg, Oral, HS  losartan, 50 mg, Oral, Daily  pantoprazole, 40 mg, Oral, Early Morning  senna, 1 tablet, Oral, Daily      Continuous IV Infusions:        PRN Meds:  acetaminophen, 650 mg, Oral, Q6H PRN  nitroglycerin, 0 4 mg, Sublingual, Q5 Min PRN  ondansetron, 4 mg, Intravenous, Q6H PRN    Tele  SCDs  NC oxygen to keep sats > 90%  Neuro checks q 4 hr   Orthostatics   IP CONSULT TO CARDIOLOGY  IP CONSULT TO NEUROLOGY    Network Utilization Review Department  ATTENTION: Please call with any questions or concerns to [phone] and carefully listen to the prompts so that you are directed to the right person  All voicemails are confidential   Bhupinder Meyer all requests for admission clinical reviews, approved or denied determinations and any other requests to dedicated fax number below belonging to the campus where the patient is receiving treatment   List of dedicated fax numbers for the Facilities:  FACILITY NAME UR FAX NUMBER   ADMISSION DENIALS (Administrative/Medical Necessity) [phone]   [address] (Maternity/NICU/Pediatrics) [address] [facility],[address] Floor [facility] [address] [place] [address] Dr Mary Schwartz 9809 (  Salvador Stockton "Randee" 103) [address] [address] [facility] [address] [place] [address] [phone]

## 2021-03-25 NOTE — ASSESSMENT & PLAN NOTE
· Patient having confusion, disorientation, poor memory, headaches, dizziness and generalized weakness for 1 week  · CT scan of the head revealed: No acute intracranial pathology  Surgical changes of prior right-sided craniotomy and cranioplasty with bone flap replacement   Unchanged right temporal lobe encephalomalacia and gliosis  · Neuro checks  · Orthostatic vital signs  · Diuretic medications on hold due to possible dehydration  · Neurology consult (neurology advanced practitioner informed)  · PT OT evaluation (at baseline, patient uses a walker or a cane); also for OT cognitive evaluation    Gerontology consult outpatient

## 2021-03-25 NOTE — ASSESSMENT & PLAN NOTE
· Mild hypernatremia with elevated BUN  · Likely mild dehydration  · Diuretic medications witheld  · Monitor  · Further workup OP as indicated

## 2021-03-25 NOTE — ASSESSMENT & PLAN NOTE
Wt Readings from Last 3 Encounters:   03/25/21 79 2 kg (174 lb 9 7 oz)   02/24/21 80 7 kg (178 lb)   12/29/20 79 4 kg (175 lb)     · Presently compensated  · Monitored input and output  · Weigh patient daily

## 2021-03-25 NOTE — TELEPHONE ENCOUNTER
I called to relay results of monitor  Patient went to ED yesterday found to be dehydrated  Results given to LUDWIG Cruz

## 2021-03-25 NOTE — ASSESSMENT & PLAN NOTE
· Mildly elevated troponin at 0 06   · Rule out acute coronary syndrome  · NSTEMI type 1 versus NSTEMI type 2, with patient having history of palpitations; sinus bradycardia with PACs on EKG  · Not given aspirin in the emergency room due to history of intracerebral hemorrhage, status post surgery  · Cardiology consult (cardiologist informed)  · Telemetry  · Continue trending troponins  · Nitroglycerin p r n     · Oxygen p r n  Shekhar Guise · Pain control p r n  Shekhar Guise   Patient denies chest pain and SOB this morning

## 2021-03-25 NOTE — PHYSICAL THERAPY NOTE
PHYSICAL THERAPY EVALUATION NOTE    Patient Name: Kelly John  [id number] Date: 3/25/2021     AGE:   79 y o  Mrn:   [id number]  ADMIT DX:  Elevated troponin [X81 9]  Chronic diastolic (congestive) heart failure (HCC) [I50 32]  Chest pain, unspecified type [R07 9]  AMS (altered mental status) [R41 82]    Past Medical History:   Diagnosis Date    Anxiety     Arthritis     joints    Cataract     Disease of thyroid gland     Eczema     GERD (gastroesophageal reflux disease)     Gout     Heart failure (HCC)     Hepatitis     Hiatal hernia     Hypertension     Onychomycosis     last assessed: 16    Orthopnea     resolved: 16    Pseudogout of left wrist     Sleep apnea     wears CPAP    Stroke ([facility] [address] )      Length Of Stay: 0  PHYSICAL THERAPY EVALUATION :   Patient's identity confirmed via 2 patient identifiers (full name and ) at start of session       21 0955   PT Last Visit   PT Visit Date 21   Note Type   Note type Evaluation   Pain Assessment   Pain Assessment Tool Pain Assessment not indicated - pt denies pain   Pain Score No Pain   Home Living   Type of [address] [place] [address] One level  (1 KUMAR (small))   Bathroom Shower/Tub Walk-in shower   Home Equipment Walker;Cane   Additional Comments Pt resides w/ her son and his family in a 1 SH w/ 1 small KUMAR   Pt reports lately she's been using a SPC for mobility, but is starting to go without it   Prior Function   Level of [place] Independent with ADLs and functional mobility   Lives With Family;Son  (Son, DIL, 23 y/o grandson)   Receives Help From Family   ADL Assistance Independent   IADLs Needs assistance   Falls in the last 6 months 0   Vocational Retired   Restrictions/Precautions   [place] Bearing Precautions Per Order No   Other Precautions Cognitive;Telemetry   General   Family/Caregiver Present No   Cognition   Arousal/Participation Alert Orientation Level Oriented X4  (Pt identified by full name and )   Memory Decreased short term memory   Following Commands Follows all commands and directions without difficulty   Comments Pt supine in bed upon arrival  She is very pleasant and agreeable to participate in PT evaluation  Pt states she feels much better  She does c/o some difficulty w/ her memory, see OT eval for further cognitive assessments   RLE Assessment   RLE Assessment WFL   Strength RLE   R Hip Flexion 4-/5   R Knee Extension 4-/5   R Ankle Dorsiflexion 4-/5   LLE Assessment   LLE Assessment WFL   Strength LLE   L Hip Flexion 4-/5   L Knee Extension 4-/5   L Ankle Dorsiflexion 4-/5   Coordination   Movements are Fluid and Coordinated 1   Sensation WFL   Light Touch   RLE Light Touch Grossly intact   LLE Light Touch Grossly intact   Bed Mobility   Supine to Sit 7  Independent   Sit to Supine Unable to assess   Additional Comments Pt seated OOB in recliner chair at end of session   Transfers   Sit to Stand 7  Independent   Stand to Sit 7  Independent   Ambulation/Elevation   Gait pattern WNL   Gait Assistance 6  Modified independent   Assistive Device SPC  (but more or less just carrying SPC)   Distance 250 ft   Curbs Comfortable gait speed w/ SPC = 1 9 m/s   Balance   Static Sitting Good   Static Standing Good   Ambulatory Good   Activity Tolerance   Activity Tolerance Patient tolerated treatment well   Medical Staff Made Aware Care coordination w/ OT Lakisha, Spoke to Dr Shama Hogan,    Nurse Made Aware Spoke to Nadine who states pt is appropriate to participate   Assessment   Prognosis Good   Problem List Impaired balance;Decreased cognition   Assessment [employer] J[employer] Moya is a 79 y o  Female who [facility]24[facility]2021 from home w/ c/o confusion, poor memory, generalized fatigue3 and diagnosis of AMS  Orders for PT eval and treat received, w/ activity orders of up w/ A and fall precautions   Pt presents w/ comorbidities of intraparenchymal hemorrhage of brain, s/p decompressive craniectomy evacuation of hematoma and replacement of cranial flap (Feb-April 2020), HTN, CHF, GERD  and personal factors including: advanced age, mobilizing w/ assistive device and stair(s) to enter home  At baseline, pt mobilizes independently w/ SPC, and reports 0 falls in the last 6 months  Upon evaluation, pt presents w/ the following deficits: weakness and decreased cognition  Pt requires I for bed mobility, I for transfers, and mod I for gait w/ SPC for 250 ft  Pt's comfortable gait speed is 1 9 m/s, which is higher than the average speed for age-matched peers, and pt is able to ambulate in community including safely cross the street  Pt's clinical presentation is unstable/unpredictable due to poor blood pressure control, need for continued OP cardiac workup, significant PMH for CVA and recent crinectomy (within 1 year)  At this time, pt is able to navigate around hospital environment without assistance, and report no concern w/ navigating home environment upon D/C  No acute PT needs at this time, will D/C from PT caseload  Discharge recommendation is for home w/ family support in order to reduce fall risk and maximize level of functional independence      Goals   Patient Goals to go home today   Recommendation   PT Discharge Recommendation Return to previous environment with social support   AM-PAC Basic Mobility Inpatient   Turning in Bed Without Bedrails 4   Lying on Back to Sitting on Edge of Flat Bed 4   Moving Bed to Chair 4   Standing Up From Chair 4   Walk in Room 4   Climb 3-5 Stairs 4   Basic Mobility Inpatient Raw Score 24   Basic Mobility Standardized Score 57 68   Barthel Index   Feeding 10   Bathing 5   Grooming Score 5   Dressing Score 10   Bladder Score 10   Bowels Score 10   Toilet Use Score 10   Transfers (Bed/Chair) Score 15   Mobility (Level Surface) Score 15   Stairs Score 10   Barthel Index Score 100         The patient's AM-PAC Basic Mobility Inpatient Short Form Raw Score is 24, Standardized Score is 57 68  A standardized score greater than 42 9 suggests the patient may benefit from discharge to home  Please also refer to the recommendation of the Physical Therapist for safe discharge planning  Comfortable Gait Speed Norms:    Age 80-81 y/o   Male: 0 88 m/s   Female: 0 80 m/s   No AD: 0 91 m/s   (+) AD: 0 63 m/s        Gait Speed Interpretation:  Gain of 0 1 m/s is a predictor of well-being in those w/ abnormal walking speed compared to age-patched peers  <0 7m/s is at an increased risk for falls    Household ambulator: <0 4 m/s  Limited community ambulator: 0 4-0 8 m/s  [employer] ambulator: 0 8-1 2 m/s  Able to safely cross streets: >1 2 m/s      Given the above findings from this evaluation, at this time this patient does not require skilled inpatient PT  Will D/C patient from PT caseload      Brook Collins, PT, DPT

## 2021-03-25 NOTE — TELEPHONE ENCOUNTER
----- Message from Kenneth Downey MD sent at 3/24/2021  3:02 PM EDT -----  Patient had a min HR of 47 bpm, max HR of 179 bpm, and avg HR of 71  bpm  Predominant underlying rhythm was Sinus Rhythm  Bundle Branch  Block/IVCD was present  109 Supraventricular Tachycardia runs occurred,  the run with the fastest interval lasting 5 beats with a max rate of 179  bpm, the longest lasting 29 4 secs with an avg rate of 103 bpm  Isolated  SVEs were frequent (6 0%, 57817), SVE Couplets were rare (<1 0%, 1),  and SVE Triplets were rare (<1 0%, 55)  Isolated VEs were rare (<1 0%),  VE Couplets were rare (<1 0%), and no VE Triplets were present  Final interpretation: No evidence of afib/flutter  Brief short SVT   Average heart rate normal 71bpm

## 2021-03-29 ENCOUNTER — OFFICE VISIT (OUTPATIENT)
Dept: FAMILY MEDICINE CLINIC | Facility: CLINIC | Age: 81
End: 2021-03-29
Payer: MEDICARE

## 2021-03-29 ENCOUNTER — OFFICE VISIT (OUTPATIENT)
Dept: NEPHROLOGY | Facility: CLINIC | Age: 81
End: 2021-03-29
Payer: MEDICARE

## 2021-03-29 VITALS
DIASTOLIC BLOOD PRESSURE: 82 MMHG | SYSTOLIC BLOOD PRESSURE: 130 MMHG | RESPIRATION RATE: 18 BRPM | BODY MASS INDEX: 33.61 KG/M2 | WEIGHT: 178 LBS | HEART RATE: 86 BPM | HEIGHT: 61 IN | OXYGEN SATURATION: 97 %

## 2021-03-29 VITALS
HEART RATE: 59 BPM | HEIGHT: 61 IN | DIASTOLIC BLOOD PRESSURE: 62 MMHG | SYSTOLIC BLOOD PRESSURE: 134 MMHG | BODY MASS INDEX: 33.99 KG/M2 | WEIGHT: 180 LBS

## 2021-03-29 DIAGNOSIS — N18.31 STAGE 3A CHRONIC KIDNEY DISEASE (HCC): Primary | ICD-10-CM

## 2021-03-29 DIAGNOSIS — E03.9 HYPOTHYROIDISM, UNSPECIFIED TYPE: Primary | Chronic | ICD-10-CM

## 2021-03-29 PROCEDURE — 99496 TRANSJ CARE MGMT HIGH F2F 7D: CPT | Performed by: FAMILY MEDICINE

## 2021-03-29 PROCEDURE — 1111F DSCHRG MED/CURRENT MED MERGE: CPT | Performed by: FAMILY MEDICINE

## 2021-03-29 PROCEDURE — 99214 OFFICE O/P EST MOD 30 MIN: CPT | Performed by: INTERNAL MEDICINE

## 2021-03-29 NOTE — LETTER
March 29, 2021     Taylor Truong MD  [address]  [place] [address]    Patient: Arnoldo Rose   YOB: 1940   Date of Visit: 3/29/2021       Dear Dr Rivka Javier:    Thank you for referring Bernard Melvin to me for evaluation  Below are my notes for this consultation  If you have questions, please do not hesitate to call me  I look forward to following your patient along with you  Sincerely,        Eveline Birmingham MD        CC: No Recipients  Eveline Birmingham MD  3/29/2021  2:07 PM  Sign when Signing Visit  NEPHROLOGY OFFICE VISIT   Arnoldo Rose 79 y o  female MRN: [id number]  3/29/2021    Reason for Visit: CKD III    ASSESSMENT and PLAN:    I had the pleasure of seeing Ms Wilmer Reyes today in the renal clinic for the continued management of CKD III     2/2020 - February with 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 on February 11th   Completed course of Keppra for seizure prophylaxis      3/30/2020 -Orelia Fair was increased to 50 mg twice a day from 50 in the morning and 25 in the evening due to high blood pressures     4/3/2020 - spironolactone 12 5 mg daily was started due to continued high blood pressure     4/2020 - patient had presented for replacement current of cranioplasty on April 6   And subsequently was admitted to the rehab center postoperatively     March 2021- patient was admitted to hospital with not feeling well and   Headache  CT scan of the head with no acute intracranial pathology  The other symptoms of memory loss were also present  Patient was referred to geriatrician      50-year-old female with a past medical history of chronic kidney disease, venous stasis, HTN, hypothyroidism, lumbar canal stenosis, Anxiety, GERD, neuropathy, Gout, EF 20-74%, Grade 2 diastolic dysfunction who presents for follow up for CKD   To note, patient's  has now passed away in August      1) CKD - Prior year had nl Cr prior to [employer] 2016 and then had SHABANA during diarrhea episode  To note, patient also has a long standing history of HTN  Creatinine in 2013, 0 9 mg/dL  Cr early 2016 was 0 80-0 9 mg/dL; then increased starting in august 2016 to 2 3 mg/dL and has fluctuated since  CT scan in 2016, kidneys appearing normal at that time       Etiology of CKD may be long standing HTN and ATN  Baseline Cr ~ 1 1 -1 3 mg/dL     Most recent creatinine is    1 13 mg/dL on March 25th in 2021 in the hospital   Stable electrolytes  Urinalysis in the hospital with small leukocytes but otherwise bland      - Urine eos 0%;   - A1c controlled prior  at 5 7% on December 8th in 2020  -U PCR stable  0 15 stable --> 0 18 gm/gm in 6/2020 --> 0 1 December 8th --> 0 12 gm/gm (3/22/2021)  -SPEP unrevealing  -UPEP unrevealing  - C3, C4 unrevealing  - Renal u/s with R 10 3 cm, L 10 4 cm, renal cortex 1 cm b/l; both kidneys slightly reduced in size; lower pole of R kidney is non obstructing calculus  - Pt follows with Urology team for nephrolithiasis  - No NSAIDs, the patient is not currently taking NSAID  -nuc stress test per Cardiology in Jan 2019 unrevealing  - repeat renal u/s 1/2019 - unrevealing with exception of renal cortical atrophy; but also 6 mm non shadowing calculus      Plan:     -   Patient's blood pressures are appropriate     -Patient will keep blood pressure log for 2 weeks at home with parameters for calling below   - lab work in 3 months   -Appointment in 3 months   -  continue current antihypertensive regimen   Patient is on losartan 50 mg in the morning and 25 mg in the evening, spironolactone 12 5 mg daily, amlodipine 10 mg daily, Bumex 2 mg daily (has low diastolics and syst is 985A)     2) SOB - Volume - follows with Cardiology     -on Bumetanide  - euvolemic in the office with trace lower extremity edema     3) HTN -      - cont losartan, bumex and amlodipine, spironolactone  -  due to bradycardia, beta-blocker was held prior  - no changes today  See above       4) hypothyroid - as per Primary Care Physician     5) HPL - Primary Care following       6) Electrolytes - stable     7) MBD -     - Vit D 50 7 in nov 2019  - PTH improved 89 in nov 2019 --> 78 6 in June 2020     8) gout -      -monitor for flare     9) back pain - follows with Pain management     10) Colonoscopy in feb with internal hemorrhoids and polyp removed       11) alk phos elevation     -improved     12) Anemia - Hb stable     -hemoglobin stable     13) Mitral valve calcification     - per Cardiology team     14) elevated D-dimer prior-patient was given duplex of lower extremities which was unrevealing     15) knee pain after fall in august 2020     - saw Orthopedic team  - steroid injection was given     16) intracranial hemorrhage with mass effect requiring craniotomy on 2/11/2020     - now is status post replacement of cranioplasty in April 2020     17)  Anxiety- patient was started on Cymbalta  PCP is attempting off of gabapentin    - there were periods of not taking cymbalta     18)   Zoster infection in November 2020-treated with Valtrex per primary team     It was a pleasure evaluating your patient  Thank you for allowing our team to participate in the care of Ms Dior Block  Please do not hesitate to contact our team if further issues/questions shall arise in the interim    No problem-specific Assessment & Plan notes found for this encounter  HPI:     patient today denies complaints  Was recently in the hospital for headache  CT scan of the head was unrevealing  Did not required medication changes are new interventions      PATIENT INSTRUCTIONS:    Patient Instructions   1) Avoid NSAIDS - (Example - motrin, advil, ibuprofen, aleve, exederin, etc)  2) Always follow a low salt diet  3) please check your blood pressures for 2 weeks (once a day checks) and call with results  4) if your blood pressures are above 150 (top number) more than 2 days in a row, please call   5) if your blood pressures are above 160, retake the blood pressure in 10 minutes and if they are still above 160, please call  6) no changes to your medications to no change in medications today  7) appointment in 3 months with labwork prior to appt        OBJECTIVE:  Current Weight: Weight - Scale: 81 6 kg (180 lb)  Vitals:    03/29/21 1330   BP: 134/62   BP Location: Right arm   Patient Position: Sitting   Cuff Size: Standard   Pulse: 59   Weight: 81 6 kg (180 lb)   Height: 5' 1" (1 549 m)    Body mass index is 34 01 kg/m²  REVIEW OF SYSTEMS:    Review of Systems   Constitutional: Negative  Negative for fatigue  HENT: Negative  Eyes: Negative  Respiratory: Negative  Negative for shortness of breath  Cardiovascular: Negative  Negative for leg swelling  Gastrointestinal: Negative  Endocrine: Negative  Genitourinary: Negative  Negative for difficulty urinating  Musculoskeletal: Negative  Skin: Negative  Allergic/Immunologic: Negative  Neurological: Negative  Hematological: Negative  Psychiatric/Behavioral: Negative  All other systems reviewed and are negative  PHYSICAL EXAM:      Physical Exam  Vitals signs and nursing note reviewed  Constitutional:       General: She is not in acute distress  Appearance: She is well-developed  She is not diaphoretic  HENT:      Head: Normocephalic and atraumatic  Eyes:      General: No scleral icterus  Right eye: No discharge  Left eye: No discharge  Conjunctiva/sclera: Conjunctivae normal    Neck:      Musculoskeletal: Normal range of motion and neck supple  Vascular: No JVD  Cardiovascular:      Rate and Rhythm: Normal rate and regular rhythm  Heart sounds: No murmur  No friction rub  No gallop  Pulmonary:      Effort: Pulmonary effort is normal  No respiratory distress  Breath sounds: Normal breath sounds  No wheezing or rales     Abdominal:      General: Bowel sounds are normal  There is no distension  Palpations: Abdomen is soft  Tenderness: There is no abdominal tenderness  There is no rebound  Musculoskeletal: Normal range of motion  General: No tenderness or deformity  Right lower leg: Edema present  Left lower leg: Edema present  Comments: Trace lower extremity   Skin:     General: Skin is warm and dry  Coloration: Skin is not pale  Findings: No erythema or rash  Neurological:      Mental Status: She is alert and oriented to person, place, and time  Coordination: Coordination normal    Psychiatric:         Behavior: Behavior normal          Thought Content:  Thought content normal          Judgment: Judgment normal          Medications:    Current Outpatient Medications:     amLODIPine (NORVASC) 10 mg tablet, Take 1 tablet (10 mg total) by mouth daily, Disp: 90 tablet, Rfl: 1    atorvastatin (LIPITOR) 40 mg tablet, Take 1 tablet (40 mg total) by mouth every evening, Disp: 90 tablet, Rfl: 1    bumetanide (BUMEX) 2 mg tablet, Take 1 tablet (2 mg total) by mouth daily, Disp: 135 tablet, Rfl: 3    DULoxetine (CYMBALTA) 20 mg capsule, Take 1 capsule (20 mg total) by mouth daily, Disp: 30 capsule, Rfl: 5    gabapentin (NEURONTIN) 100 mg capsule, Take 1 capsule (100 mg total) by mouth 3 (three) times a day, Disp: 90 capsule, Rfl: 0    levothyroxine 88 mcg tablet, Take 1 tablet (88 mcg total) by mouth daily, Disp: 90 tablet, Rfl: 1    losartan (COZAAR) 25 mg tablet, Take 2 tab in AM and 1 tab in PM, Disp: 180 tablet, Rfl: 3    omeprazole (PriLOSEC) 20 mg delayed release capsule, Take 1 capsule (20 mg total) by mouth every morning, Disp: 90 capsule, Rfl: 1    spironolactone (ALDACTONE) 25 mg tablet, Take 0 5 tablets (12 5 mg total) by mouth daily, Disp: 45 tablet, Rfl: 3    Laboratory Results:  Results from last 7 days   Lab Units 03/25/21  0516 03/24/21  1738 03/24/21  1149   WBC Thousand/uL 5 78  --  5 48   HEMOGLOBIN g/dL 11 4*  -- 12 1   HEMATOCRIT % 36 0  --  38 6   PLATELETS Thousands/uL 171 178 180   POTASSIUM mmol/L 3 8  --  4 1   CHLORIDE mmol/L 107  --  107   CO2 mmol/L 28  --  30   BUN mg/dL 34*  --  33*   CREATININE mg/dL 1 13  --  1 17   CALCIUM mg/dL 9 5  --  9 1   MAGNESIUM mg/dL 1 8  --   --        Results for orders placed or performed during the hospital encounter of 03/24/21   CBC and differential   Result Value Ref Range    WBC 5 48 4 31 - 10 16 Thousand/uL    RBC 4 41 3 81 - 5 12 Million/uL    Hemoglobin 12 1 11 5 - 15 4 g/dL    Hematocrit 38 6 34 8 - 46 1 %    MCV 88 82 - 98 fL    MCH 27 4 26 8 - 34 3 pg    MCHC 31 3 (L) 31 4 - 37 4 g/dL    RDW 14 3 11 6 - 15 1 %    MPV 11 1 8 9 - 12 7 fL    Platelets 639 670 - 121 Thousands/uL    nRBC 0 /100 WBCs    Neutrophils Relative 72 43 - 75 %    Immat GRANS % 0 0 - 2 %    Lymphocytes Relative 16 14 - 44 %    Monocytes Relative 9 4 - 12 %    Eosinophils Relative 2 0 - 6 %    Basophils Relative 1 0 - 1 %    Neutrophils Absolute 3 88 1 85 - 7 62 Thousands/µL    Immature Grans Absolute 0 02 0 00 - 0 20 Thousand/uL    Lymphocytes Absolute 0 90 0 60 - 4 47 Thousands/µL    Monocytes Absolute 0 51 0 17 - 1 22 Thousand/µL    Eosinophils Absolute 0 13 0 00 - 0 61 Thousand/µL    Basophils Absolute 0 04 0 00 - 0 10 Thousands/µL   Comprehensive metabolic panel   Result Value Ref Range    Sodium 146 (H) 136 - 145 mmol/L    Potassium 4 1 3 5 - 5 3 mmol/L    Chloride 107 100 - 108 mmol/L    CO2 30 21 - 32 mmol/L    ANION GAP 9 4 - 13 mmol/L    BUN 33 (H) 5 - 25 mg/dL    Creatinine 1 17 0 60 - 1 30 mg/dL    Glucose 92 65 - 140 mg/dL    Calcium 9 1 8 3 - 10 1 mg/dL    AST 15 5 - 45 U/L    ALT 22 12 - 78 U/L    Alkaline Phosphatase 128 (H) 46 - 116 U/L    Total Protein 7 5 6 4 - 8 2 g/dL    Albumin 3 6 3 5 - 5 0 g/dL    Total Bilirubin 0 53 0 20 - 1 00 mg/dL    eGFR 44 ml/min/1 73sq m   UA (URINE) with reflex to Scope   Result Value Ref Range    Color, UA Light Yellow     Clarity, UA Clear     Specific Gravity, UA <=1 005 1 003 - 1 030    pH, UA 5 5 4 5, 5 0, 5 5, 6 0, 6 5, 7 0, 7 5, 8 0    Leukocytes, UA Small (A) Negative    Nitrite, UA Negative Negative    Protein, UA Negative Negative mg/dl    Glucose, UA Negative Negative mg/dl    Ketones, UA Negative Negative mg/dl    Urobilinogen, UA 0 2 0 2, 1 0 E U /dl E U /dl    Bilirubin, UA Negative Negative    Blood, UA Negative Negative   Troponin I   Result Value Ref Range    Troponin I 0 06 (H) <=0 04 ng/mL   TSH   Result Value Ref Range    TSH 3RD GENERATON 1 847 0 358 - 3 740 uIU/mL   Urine Microscopic   Result Value Ref Range    RBC, UA None Seen None Seen, 0-1, 1-2, 2-4, 0-5 /hpf    WBC, UA 1-2 None Seen, 0-1, 1-2, 0-5, 2-4 /hpf    Epithelial Cells None Seen None Seen, Occasional /hpf    Bacteria, UA None Seen None Seen, Occasional /hpf   Troponin I   Result Value Ref Range    Troponin I 0 07 (H) <=0 04 ng/mL   Platelet count   Result Value Ref Range    Platelets 096 468 - 312 Thousands/uL    MPV 10 9 8 9 - 12 7 fL   Troponin I   Result Value Ref Range    Troponin I 0 07 (H) <=0 04 ng/mL   TSH, 3rd generation with Free T4 reflex   Result Value Ref Range    TSH 3RD GENERATON 1 795 0 358 - 3 740 uIU/mL   Basic metabolic panel   Result Value Ref Range    Sodium 145 136 - 145 mmol/L    Potassium 3 8 3 5 - 5 3 mmol/L    Chloride 107 100 - 108 mmol/L    CO2 28 21 - 32 mmol/L    ANION GAP 10 4 - 13 mmol/L    BUN 34 (H) 5 - 25 mg/dL    Creatinine 1 13 0 60 - 1 30 mg/dL    Glucose 92 65 - 140 mg/dL    Glucose, Fasting 92 65 - 99 mg/dL    Calcium 9 5 8 3 - 10 1 mg/dL    eGFR 46 ml/min/1 73sq m   Magnesium   Result Value Ref Range    Magnesium 1 8 1 6 - 2 6 mg/dL   CBC (With Platelets)   Result Value Ref Range    WBC 5 78 4 31 - 10 16 Thousand/uL    RBC 4 10 3 81 - 5 12 Million/uL    Hemoglobin 11 4 (L) 11 5 - 15 4 g/dL    Hematocrit 36 0 34 8 - 46 1 %    MCV 88 82 - 98 fL    MCH 27 8 26 8 - 34 3 pg    MCHC 31 7 31 4 - 37 4 g/dL    RDW 14 4 11 6 - 15 1 %    Platelets 825 149 - 390 Thousands/uL    MPV 11 3 8 9 - 12 7 fL   Troponin I   Result Value Ref Range    Troponin I 0 07 (H) <=0 04 ng/mL

## 2021-03-29 NOTE — PROGRESS NOTES
FAMILY MEDICINE TRANSITION OF CARE OFFICE VISIT  [facility]    NAME: Rosalind Wise  AGE: 79 y o  SEX: female  : 1940       DATE: 3/29/2021    Assessment and Plan     Hypothyroidism  TSH stable  Continue levothyroxine 88 micrograms  AMS (altered mental status)  Initial ER workup including CT scan of the head did not show any acute intracranial changes  Unchanged right temporal lobe post op changes  Symptoms improved since patient started taking Cymbalta 20 milligram regularly  Would like to discontinue gabapentin 100 milligram q h s  Patient will monitor her symptoms  Scheduled for routine metabolic labs/follow-up at 3 months interval/sooner if any of the symptoms worsen  - Counseling Documentation: patient was counseled regarding: diagnostic results, instructions for management, risk factor reductions, prognosis, patient and family education, impressions, risks and benefits of treatment options and importance of compliance with treatment    Transitional Care Management Review     Edwina Jovel is a 79 y o  female here for TCM follow-up    During the TCM phone call patient stated:    TCM Call (since 2021)     Date and time call was made  3/25/2021  4:23 PM    Hospital care reviewed  Records reviewed    Patient was hospitialized at  [address] [place] [address]        Date of Admission  21    Date of discharge  21    Diagnosis  AMS (altered mental status)    Disposition  Home    Were the patients medications reviewed and updated  Yes    Current Symptoms  None      TCM Call (since 2021)     Post hospital issues  None    Should patient be enrolled in anticoag monitoring? No    Scheduled for follow up?   Yes    Did you obtain your prescribed medications  Yes    Do you need help managing your prescriptions or medications  No    Is transportation to your appointment needed  No    I have advised the patient to call PCP with any new or worsening symptoms  WAN AGUILAR MA    Support System  Family    Do you have social support  Yes, as much as I need    Are you recieving any outpatient services  No    Are you recieving home care services  No    Are you using any community resources  No    Current waiver services  No    Have you fallen in the last 12 months  No    Interperter language line needed  No          History of Present Illness     HPI  Patient following up from recent hospital discharge  Admitted for symptoms of headache the for over a week  Past history significant for right intracerebral hemorrhage status post neurosurgery February  Patient noted to have elevated troponins  Admitted overnight for observation  CT scan brain reported stable  According to her daughter-in-law she was experiencing generalized weakness and confusion for the past 1 week  She had discontinued taking Cymbalta, and gabapentin  Patient states that she does not like to be on many pills  She has restarted taking Cymbalta regularly since the hospital discharge  States some of her symptoms of confusion and dizziness have resolved  Patient would like to discontinue gabapentin  Has taken it for many years for tingling and numbness in her legs  Patient is not sure if the medication is helping her  She would like to discontinue the medication  Patient states that she gets very tired after taking gabapentin  Patient was also experience palpitations, was provided a Zio heart monitor at her cardiologist Dr Carolina Forde was consulted  The Zio monitor was reported normal   According to patient her initial presenting symptoms have resolved now  The following portions of the patient's history were reviewed and updated as appropriate: allergies, current medications, past family history, past medical history, past social history, past surgical history and problem list     Review of Systems       Review of Systems   Constitutional: Negative  Eyes: Negative  Respiratory: Negative  Negative for shortness of breath  Cardiovascular: Negative  Negative for chest pain and palpitations  Gastrointestinal: Negative  Endocrine: Negative  Genitourinary: Negative  Musculoskeletal: Negative  Negative for myalgias  Neurological: Negative  Negative for headaches  Psychiatric/Behavioral: Negative  Active Problem List     Patient Active Problem List   Diagnosis    Benign essential HTN    Chronic diastolic (congestive) heart failure (HCC)    Hypothyroidism    Acute on chronic diastolic congestive heart failure (HCC)    Arthropathy of knee    Hallux abductovalgus with bunions, unspecified laterality    CKD (chronic kidney disease), stage III    Diverticulitis of colon    Chronic gout without tophus    Hiatal hernia    Hyperlipemia    Hyperuricemia    Morbid obesity with body mass index of 40 0-44 9 in adult ([facility] [address] )    Nephrolithiasis    Onychomycosis    WENDI (obstructive sleep apnea)    Umbilical hernia    Rupture of popliteal cyst    Pseudogout of left wrist    Alkaline phosphatase elevation    Gastroesophageal reflux disease without esophagitis    Iron deficiency anemia secondary to inadequate dietary iron intake    Intraparenchymal hemorrhage of brain (HCC)    Prediabetes    Status post right frontotemporal replacement cranioplasty    BMI 38 0-38 9,adult    Need for vaccination    Chest pain    AMS (altered mental status)    Hypernatremia       Objective     /82   Pulse 86   Resp 18   Ht 5' 1" (1 549 m)   Wt 80 7 kg (178 lb)   SpO2 97%   BMI 33 63 kg/m²     Physical Exam  Constitutional:       Appearance: She is well-developed  Eyes:      Pupils: Pupils are equal, round, and reactive to light  Neck:      Musculoskeletal: Normal range of motion  Cardiovascular:      Rate and Rhythm: Normal rate and regular rhythm  Heart sounds: Normal heart sounds     Pulmonary:      Effort: Pulmonary effort is normal  Breath sounds: Normal breath sounds  Abdominal:      General: Bowel sounds are normal       Palpations: Abdomen is soft  Musculoskeletal: Normal range of motion  Neurological:      General: No focal deficit present  Mental Status: She is alert and oriented to person, place, and time  Mental status is at baseline  Sensory: No sensory deficit  Motor: No weakness  Coordination: Coordination normal       Gait: Gait normal    Psychiatric:         Behavior: Behavior normal          Judgment: Judgment normal          Laboratory Results: I have personally reviewed the pertinent laboratory results/reports     Radiology/Other Diagnostic Testing Results: I have personally reviewed pertinent reports  Xr Chest 2 Views    Result Date: 3/24/2021  CHEST INDICATION:   Weakness  COMPARISON:  2/10/2020 EXAM PERFORMED/VIEWS:  XR CHEST PA & LATERAL Images: 2 FINDINGS: Cardiomediastinal silhouette appears unremarkable  The lungs are clear  No pneumothorax or pleural effusion  Osseous structures appear within normal limits for patient age  No acute cardiopulmonary disease  Findings are stable Workstation performed: [id number]     Ct Head Without Contrast    Result Date: 3/24/2021  CT BRAIN - WITHOUT CONTRAST INDICATION:   Headache  Vertigo  COMPARISON:  June 19, 2020 TECHNIQUE:  CT examination of the brain was performed  In addition to axial images, sagittal and coronal 2D reformatted images were created and submitted for interpretation  Radiation dose length product (DLP) for this visit:  852 mGy-cm   This examination, like all CT scans performed in the [facility], was performed utilizing techniques to minimize radiation dose exposure, including the use of iterative reconstruction and automated exposure control  IMAGE QUALITY:  Diagnostic  FINDINGS: PARENCHYMA: Again noted changes of prior right-sided craniotomy and cranioplasty    The previously seen extra-axial hematoma subjacent to the cranioplasty site has completely resolved  No new areas of acute intracranial hemorrhage are seen  There is encephalomalacia and gliosis involving the right temporal lobe, unchanged  Again noted is a prior catheter tract in the right frontal lobe with resolution of previously seen associated hemorrhage  Basilar cisterns are unremarkable  No acute intracranial ischemia  VENTRICLES AND EXTRA-AXIAL SPACES:  Unchanged  No acute extra-axial hemorrhage  VISUALIZED ORBITS AND PARANASAL SINUSES:  The patient is status post bilateral cataract surgery  Sinuses and mastoid air cells are normally aerated  CALVARIUM AND EXTRACRANIAL SOFT TISSUES:  Post surgical changes as described above  No acute osseous abnormality  No acute intracranial pathology  Surgical changes of prior right-sided craniotomy and cranioplasty with bone flap replacement  Unchanged right temporal lobe encephalomalacia and gliosis   Workstation performed: [id number]        Current Medications     Current Outpatient Medications:     amLODIPine (NORVASC) 10 mg tablet, Take 1 tablet (10 mg total) by mouth daily, Disp: 90 tablet, Rfl: 1    atorvastatin (LIPITOR) 40 mg tablet, Take 1 tablet (40 mg total) by mouth every evening, Disp: 90 tablet, Rfl: 1    bumetanide (BUMEX) 2 mg tablet, Take 1 tablet (2 mg total) by mouth daily, Disp: 135 tablet, Rfl: 3    DULoxetine (CYMBALTA) 20 mg capsule, Take 1 capsule (20 mg total) by mouth daily, Disp: 30 capsule, Rfl: 5    gabapentin (NEURONTIN) 100 mg capsule, Take 1 capsule (100 mg total) by mouth 3 (three) times a day, Disp: 90 capsule, Rfl: 0    levothyroxine 88 mcg tablet, Take 1 tablet (88 mcg total) by mouth daily, Disp: 90 tablet, Rfl: 1    losartan (COZAAR) 25 mg tablet, Take 2 tab in AM and 1 tab in PM, Disp: 180 tablet, Rfl: 3    omeprazole (PriLOSEC) 20 mg delayed release capsule, Take 1 capsule (20 mg total) by mouth every morning, Disp: 90 capsule, Rfl: 1    spironolactone (ALDACTONE) 25 mg tablet, Take 0 5 tablets (12 5 mg total) by mouth daily, Disp: 45 tablet, Rfl: 3    Suzanna Lopez MD  [place] [id number]

## 2021-03-29 NOTE — PROGRESS NOTES
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 on February 11th   Completed course of Keppra for seizure prophylaxis      3/30/2020 -Allayne [place] was increased to 50 mg twice a day from 50 in the morning and 25 in the evening due to high blood pressures     4/3/2020 - spironolactone 12 5 mg daily was started due to continued high blood pressure     4/2020 - patient had presented for replacement current of cranioplasty on April 6   And subsequently was admitted to the rehab center postoperatively     March 2021- patient was admitted to hospital with not feeling well and   Headache  CT scan of the head with no acute intracranial pathology  The other symptoms of memory loss were also present  Patient was referred to geriatrician      69-year-old female with a past medical history of chronic kidney disease, venous stasis, HTN, hypothyroidism, lumbar canal stenosis, Anxiety, GERD, neuropathy, Gout, EF 38-98%, Grade 2 diastolic dysfunction who presents for follow up for CKD  To note, patient's  has now passed away in August 1) CKD - Prior year had nl Cr prior to aug 2016 and then had SHABANA during diarrhea episode  To note, patient also has a long standing history of HTN  Creatinine in 2013, 0 9 mg/dL  Cr early 2016 was 0 80-0 9 mg/dL; then increased starting in august 2016 to 2 3 mg/dL and has fluctuated since  CT scan in 2016, kidneys appearing normal at that time       Etiology of CKD may be long standing HTN and ATN  Baseline Cr ~ 1 1 -1 3 mg/dL     Most recent creatinine is    1 13 mg/dL on March 25th in 2021 in the hospital   Stable electrolytes    Urinalysis in the hospital with small leukocytes but otherwise bland      - Urine eos 0%;   - A1c controlled prior  at 5 7% on December 8th in 2020  -U PCR stable  0 15 stable --> 0 18 gm/gm in 6/2020 --> 0 1 December 8th --> 0 12 gm/gm (3/22/2021)  -SPEP unrevealing  -UPEP unrevealing  - C3, C4 unrevealing  - Renal u/s with R 10 3 cm, L 10 4 cm, renal cortex 1 cm b/l; both kidneys slightly reduced in size; lower pole of R kidney is non obstructing calculus  - Pt follows with Urology team for nephrolithiasis  - No NSAIDs, the patient is not currently taking NSAID  -nuc stress test per Cardiology in Jan 2019 unrevealing  - repeat renal u/s 1/2019 - unrevealing with exception of renal cortical atrophy; but also 6 mm non shadowing calculus      Plan:     -   Patient's blood pressures are appropriate     -Patient will keep blood pressure log for 2 weeks at home with parameters for calling below   - lab work in 3 months   -Appointment in 3 months   -  continue current antihypertensive regimen   Patient is on losartan 50 mg in the morning and 25 mg in the evening, spironolactone 12 5 mg daily, amlodipine 10 mg daily, Bumex 2 mg daily (has low diastolics and syst is 315W)     2) SOB - Volume - follows with Cardiology     -on Bumetanide  - euvolemic in the office with trace lower extremity edema     3) HTN -      - cont losartan, bumex and amlodipine, spironolactone  -  due to bradycardia, beta-blocker was held prior  - no changes today   See above       4) hypothyroid - as per Primary Care Physician     5) HPL - Primary Care following       6) Electrolytes - stable     7) MBD -     - Vit D 50 7 in nov 2019  - PTH improved 89 in nov 2019 --> 78 6 in June 2020     8) gout -      -monitor for flare     9) back pain - follows with Pain management     10) Colonoscopy in feb with internal hemorrhoids and polyp removed       11) alk phos elevation     -improved     12) Anemia - Hb stable     -hemoglobin stable     13) Mitral valve calcification     - per Cardiology team     14) elevated D-dimer prior-patient was given duplex of lower extremities which was unrevealing     15) knee pain after fall in august 2020     - saw Orthopedic team  - steroid injection was given     16) intracranial hemorrhage with mass effect requiring craniotomy on 2/11/2020     - now is status post replacement of cranioplasty in April 2020     17)  Anxiety- patient was started on Cymbalta  PCP is attempting off of gabapentin    - there were periods of not taking cymbalta     18)   Zoster infection in November 2020-treated with Valtrex per primary team     It was a pleasure evaluating your patient  Thank you for allowing our team to participate in the care of Ms Mary Ham  Please do not hesitate to contact our team if further issues/questions shall arise in the interim    No problem-specific Assessment & Plan notes found for this encounter  HPI:     patient today denies complaints  Was recently in the hospital for headache  CT scan of the head was unrevealing  Did not required medication changes are new interventions      PATIENT INSTRUCTIONS:    Patient Instructions   1) Avoid NSAIDS - (Example - motrin, advil, ibuprofen, aleve, exederin, etc)  2) Always follow a low salt diet  3) please check your blood pressures for 2 weeks (once a day checks) and call with results  4) if your blood pressures are above 150 (top number) more than 2 days in a row, please call   5) if your blood pressures are above 160, retake the blood pressure in 10 minutes and if they are still above 160, please call  6) no changes to your medications to no change in medications today  7) appointment in 3 months with labwork prior to appt        OBJECTIVE:  Current Weight: Weight - Scale: 81 6 kg (180 lb)  Vitals:    03/29/21 1330   BP: 134/62   BP Location: Right arm   Patient Position: Sitting   Cuff Size: Standard   Pulse: 59   Weight: 81 6 kg (180 lb)   Height: 5' 1" (1 549 m)    Body mass index is 34 01 kg/m²  REVIEW OF SYSTEMS:    Review of Systems   Constitutional: Negative  Negative for fatigue  HENT: Negative  Eyes: Negative  Respiratory: Negative  Negative for shortness of breath  Cardiovascular: Negative  Negative for leg swelling  Gastrointestinal: Negative  Endocrine: Negative  Genitourinary: Negative  Negative for difficulty urinating  Musculoskeletal: Negative  Skin: Negative  Allergic/Immunologic: Negative  Neurological: Negative  Hematological: Negative  Psychiatric/Behavioral: Negative  All other systems reviewed and are negative  PHYSICAL EXAM:      Physical Exam  Vitals signs and nursing note reviewed  Constitutional:       General: She is not in acute distress  Appearance: She is well-developed  She is not diaphoretic  HENT:      Head: Normocephalic and atraumatic  Eyes:      General: No scleral icterus  Right eye: No discharge  Left eye: No discharge  Conjunctiva/sclera: Conjunctivae normal    Neck:      Musculoskeletal: Normal range of motion and neck supple  Vascular: No JVD  Cardiovascular:      Rate and Rhythm: Normal rate and regular rhythm  Heart sounds: No murmur  No friction rub  No gallop  Pulmonary:      Effort: Pulmonary effort is normal  No respiratory distress  Breath sounds: Normal breath sounds  No wheezing or rales  Abdominal:      General: Bowel sounds are normal  There is no distension  Palpations: Abdomen is soft  Tenderness: There is no abdominal tenderness  There is no rebound  Musculoskeletal: Normal range of motion  General: No tenderness or deformity  Right lower leg: Edema present  Left lower leg: Edema present  Comments: Trace lower extremity   Skin:     General: Skin is warm and dry  Coloration: Skin is not pale  Findings: No erythema or rash     Neurological:      Mental Status: She is alert and oriented to person, place, and time  Coordination: Coordination normal    Psychiatric:         Behavior: Behavior normal          Thought Content:  Thought content normal          Judgment: Judgment normal          Medications:    Current Outpatient Medications:     amLODIPine (NORVASC) 10 mg tablet, Take 1 tablet (10 mg total) by mouth daily, Disp: 90 tablet, Rfl: 1    atorvastatin (LIPITOR) 40 mg tablet, Take 1 tablet (40 mg total) by mouth every evening, Disp: 90 tablet, Rfl: 1    bumetanide (BUMEX) 2 mg tablet, Take 1 tablet (2 mg total) by mouth daily, Disp: 135 tablet, Rfl: 3    DULoxetine (CYMBALTA) 20 mg capsule, Take 1 capsule (20 mg total) by mouth daily, Disp: 30 capsule, Rfl: 5    gabapentin (NEURONTIN) 100 mg capsule, Take 1 capsule (100 mg total) by mouth 3 (three) times a day, Disp: 90 capsule, Rfl: 0    levothyroxine 88 mcg tablet, Take 1 tablet (88 mcg total) by mouth daily, Disp: 90 tablet, Rfl: 1    losartan (COZAAR) 25 mg tablet, Take 2 tab in AM and 1 tab in PM, Disp: 180 tablet, Rfl: 3    omeprazole (PriLOSEC) 20 mg delayed release capsule, Take 1 capsule (20 mg total) by mouth every morning, Disp: 90 capsule, Rfl: 1    spironolactone (ALDACTONE) 25 mg tablet, Take 0 5 tablets (12 5 mg total) by mouth daily, Disp: 45 tablet, Rfl: 3    Laboratory Results:  Results from last 7 days   Lab Units 03/25/21  0516 03/24/21  1738 03/24/21  1149   WBC Thousand/uL 5 78  --  5 48   HEMOGLOBIN g/dL 11 4*  --  12 1   HEMATOCRIT % 36 0  --  38 6   PLATELETS Thousands/uL 171 178 180   POTASSIUM mmol/L 3 8  --  4 1   CHLORIDE mmol/L 107  --  107   CO2 mmol/L 28  --  30   BUN mg/dL 34*  --  33*   CREATININE mg/dL 1 13  --  1 17   CALCIUM mg/dL 9 5  --  9 1   MAGNESIUM mg/dL 1 8  --   --        Results for orders placed or performed during the hospital encounter of 03/24/21   CBC and differential   Result Value Ref Range    WBC 5 48 4 31 - 10 16 Thousand/uL    RBC 4 41 3 81 - 5 12 Million/uL    Hemoglobin 12 1 11 5 - 15 4 g/dL Hematocrit 38 6 34 8 - 46 1 %    MCV 88 82 - 98 fL    MCH 27 4 26 8 - 34 3 pg    MCHC 31 3 (L) 31 4 - 37 4 g/dL    RDW 14 3 11 6 - 15 1 %    MPV 11 1 8 9 - 12 7 fL    Platelets 693 719 - 027 Thousands/uL    nRBC 0 /100 WBCs    Neutrophils Relative 72 43 - 75 %    Immat GRANS % 0 0 - 2 %    Lymphocytes Relative 16 14 - 44 %    Monocytes Relative 9 4 - 12 %    Eosinophils Relative 2 0 - 6 %    Basophils Relative 1 0 - 1 %    Neutrophils Absolute 3 88 1 85 - 7 62 Thousands/µL    Immature Grans Absolute 0 02 0 00 - 0 20 Thousand/uL    Lymphocytes Absolute 0 90 0 60 - 4 47 Thousands/µL    Monocytes Absolute 0 51 0 17 - 1 22 Thousand/µL    Eosinophils Absolute 0 13 0 00 - 0 61 Thousand/µL    Basophils Absolute 0 04 0 00 - 0 10 Thousands/µL   Comprehensive metabolic panel   Result Value Ref Range    Sodium 146 (H) 136 - 145 mmol/L    Potassium 4 1 3 5 - 5 3 mmol/L    Chloride 107 100 - 108 mmol/L    CO2 30 21 - 32 mmol/L    ANION GAP 9 4 - 13 mmol/L    BUN 33 (H) 5 - 25 mg/dL    Creatinine 1 17 0 60 - 1 30 mg/dL    Glucose 92 65 - 140 mg/dL    Calcium 9 1 8 3 - 10 1 mg/dL    AST 15 5 - 45 U/L    ALT 22 12 - 78 U/L    Alkaline Phosphatase 128 (H) 46 - 116 U/L    Total Protein 7 5 6 4 - 8 2 g/dL    Albumin 3 6 3 5 - 5 0 g/dL    Total Bilirubin 0 53 0 20 - 1 00 mg/dL    eGFR 44 ml/min/1 73sq m   UA (URINE) with reflex to Scope   Result Value Ref Range    Color, UA Light Yellow     Clarity, UA Clear     Specific Gravity, UA <=1 005 1 003 - 1 030    pH, UA 5 5 4 5, 5 0, 5 5, 6 0, 6 5, 7 0, 7 5, 8 0    Leukocytes, UA Small (A) Negative    Nitrite, UA Negative Negative    Protein, UA Negative Negative mg/dl    Glucose, UA Negative Negative mg/dl    Ketones, UA Negative Negative mg/dl    Urobilinogen, UA 0 2 0 2, 1 0 E U /dl E U /dl    Bilirubin, UA Negative Negative    Blood, UA Negative Negative   Troponin I   Result Value Ref Range    Troponin I 0 06 (H) <=0 04 ng/mL   TSH   Result Value Ref Range    TSH 3RD GENERATON 1 847 0 358 - 3 740 uIU/mL   Urine Microscopic   Result Value Ref Range    RBC, UA None Seen None Seen, 0-1, 1-2, 2-4, 0-5 /hpf    WBC, UA 1-2 None Seen, 0-1, 1-2, 0-5, 2-4 /hpf    Epithelial Cells None Seen None Seen, Occasional /hpf    Bacteria, UA None Seen None Seen, Occasional /hpf   Troponin I   Result Value Ref Range    Troponin I 0 07 (H) <=0 04 ng/mL   Platelet count   Result Value Ref Range    Platelets 415 774 - 234 Thousands/uL    MPV 10 9 8 9 - 12 7 fL   Troponin I   Result Value Ref Range    Troponin I 0 07 (H) <=0 04 ng/mL   TSH, 3rd generation with Free T4 reflex   Result Value Ref Range    TSH 3RD GENERATON 1 795 0 358 - 3 740 uIU/mL   Basic metabolic panel   Result Value Ref Range    Sodium 145 136 - 145 mmol/L    Potassium 3 8 3 5 - 5 3 mmol/L    Chloride 107 100 - 108 mmol/L    CO2 28 21 - 32 mmol/L    ANION GAP 10 4 - 13 mmol/L    BUN 34 (H) 5 - 25 mg/dL    Creatinine 1 13 0 60 - 1 30 mg/dL    Glucose 92 65 - 140 mg/dL    Glucose, Fasting 92 65 - 99 mg/dL    Calcium 9 5 8 3 - 10 1 mg/dL    eGFR 46 ml/min/1 73sq m   Magnesium   Result Value Ref Range    Magnesium 1 8 1 6 - 2 6 mg/dL   CBC (With Platelets)   Result Value Ref Range    WBC 5 78 4 31 - 10 16 Thousand/uL    RBC 4 10 3 81 - 5 12 Million/uL    Hemoglobin 11 4 (L) 11 5 - 15 4 g/dL    Hematocrit 36 0 34 8 - 46 1 %    MCV 88 82 - 98 fL    MCH 27 8 26 8 - 34 3 pg    MCHC 31 7 31 4 - 37 4 g/dL    RDW 14 4 11 6 - 15 1 %    Platelets 327 238 - 262 Thousands/uL    MPV 11 3 8 9 - 12 7 fL   Troponin I   Result Value Ref Range    Troponin I 0 07 (H) <=0 04 ng/mL

## 2021-03-29 NOTE — ASSESSMENT & PLAN NOTE
Initial ER workup including CT scan of the head did not show any acute intracranial changes  Unchanged right temporal lobe post op changes  Symptoms improved since patient started taking Cymbalta 20 milligram regularly  Would like to discontinue gabapentin 100 milligram q h s  Patient will monitor her symptoms  Scheduled for routine metabolic labs/follow-up at 3 months interval/sooner if any of the symptoms worsen

## 2021-03-29 NOTE — PATIENT INSTRUCTIONS
1) Avoid NSAIDS - (Example - motrin, advil, ibuprofen, aleve, exederin, etc)  2) Always follow a low salt diet  3) please check your blood pressures for 2 weeks (once a day checks) and call with results  4) if your blood pressures are above 150 (top number) more than 2 days in a row, please call   5) if your blood pressures are above 160, retake the blood pressure in 10 minutes and if they are still above 160, please call  6) no changes to your medications to no change in medications today  7) appointment in 3 months with labwork prior to appt

## 2021-04-01 LAB
LEFT EYE DIABETIC RETINOPATHY: NORMAL
RIGHT EYE DIABETIC RETINOPATHY: NORMAL

## 2021-04-16 NOTE — RESULT NOTES
Pulmonary, Critical Care, and Sleep Medicine~Progress Note    Name: Nirmala Chandler MRN: [id number]   : 1976 Hospital: [facility]    Date: 2021 11:33 AM Admission: 2021     Impression Plan   1. COVID 19 + PNA, B + blood type. Vit d deficient   2. Diaerrha, suspect due to above, improving    3. Elevated liver enzymes, improving. Suspected to be induced by COVID19  4. SHALOM, on home CPAP  5. DM II  6. HLD 1. Monitoring inflammatory markers   2. D dimer, probnp to be monitored    3. Cpap at night   4. Solu medrol might lower over the weekend   5. O2 titration above 90% ; needs humidification in nares. Start midflow   6. S/p actemra on   7. Diuresis again today   8. lovenox bid dosing   9. PRN over the weekend      Daily Progression:      Chest film looks a little better  O2 saturations are low  D dimer 0.57  CRP 3.51  probnp 49    4/15  0.51 d dimer   CRP 6.8  probnp 64  Feeling better following actemra    Nasal congestion noted today       Now requiring O2  Feels ok; wore NIV overnight  D dimer 0.42  CRP 9.93       Consult Note requested by hospitalist     Patient presented with increased abdominal pains and diarrhea for the past 5 days. No overt pulmonary complaints were noted. Currently on room air. No pervious pulmonary complaints noted. Still spiking fevers, but feeling better. I have reviewed the labs and previous days notes. Pertinent items are noted in HPI. Past Medical History:   Diagnosis Date    DM (diabetes mellitus) ([facility] [address] Age 28    HLD (hyperlipidaemia)     HTN       Past Surgical History:   Procedure Laterality Date    HX ORTHOPAEDIC Left 1998    pins in toes of foot    HX REFRACTIVE SURGERY        Prior to Admission medications    Medication Sig Start Date End Date Taking? Authorizing Provider   insulin pump (PATIENT SUPPLIED) misc by SubCUTAneous route continuous.    Yes Provider, Historical   metFORMIN ER (GLUCOPHAGE Verified Results  *VB - Urinary Incontinence Screen (Dx Z13 89 Screen for UI) [id number] 10:56AM Nakia Meng     Test Name Result Flag Reference   Urinary Incontinence Assessment [id number]                     Plan  Benign essential hypertension    · Olmesartan-Amlodipine-HCTZ 40-10-12 5 MG Oral Tablet (Tribenzor); take 1  tablet by mouth every morning XR) 500 mg tablet TAKE 2 TABLETS WITH BREAKFAST AND DINNER 3/31/21  Yes Ilia Laureano MD   atorvastatin (LIPITOR) 10 mg tablet TAKE 1 TABLET DAILY 3/3/21  Yes Loren Mora MD   losartan (COZAAR) 100 mg tablet TAKE 1 TABLET DAILY 20  Yes Loren Mora MD   chlorthalidone (HYGROTEN) 25 mg tablet TAKE 1 TABLET DAILY 20  Yes Loren Mora MD   multivitamin (DAILY MULTI-VITAMIN) tablet Take 1 Tab by mouth daily. Yes Provider, Historical   aspirin 81 mg tablet Take 81 mg by mouth daily. Yes Provider, Historical     Allergies   Allergen Reactions    Lisinopril Cough      Social History     Tobacco Use    Smoking status: Never Smoker    Smokeless tobacco: Never Used   Substance Use Topics    Alcohol use: Yes     Alcohol/week: 0.0 standard drinks     Comment: glass of wine 1-2 times a month      Family History   Problem Relation Age of Onset    Diabetes Mother     Heart Attack Father 61    Diabetes Maternal Grandmother     Diabetes Maternal Grandfather     Diabetes Paternal Grandmother     Diabetes Paternal Grandfather      OBJECTIVE:     Vital Signs:       Visit Vitals  /68 (BP 1 Location: Right upper arm, BP Patient Position: At rest)   Pulse 96   Temp 98.3 °F (36.8 °C)   Resp 25   Ht 5' 10\" (1.778 m)   Wt 158.7 kg (349 lb 13.9 oz)   SpO2 93%   BMI 50.20 kg/m²      Temp (24hrs), Av.8 °F (36.6 °C), Min:97.4 °F (36.3 °C), Max:98.3 °F (36.8 °C)     Intake/Output:     Last shift: No intake/output data recorded. Last 3 shifts: No intake/output data recorded.         No intake or output data in the 24 hours ending 21 0955    Physical Exam:                                        Exam Findings Other   General: No resp distress noted, appears stated age    HEENT:  No ulcers, JVD not elevated, no cervical LAD    Chest: No pectus deformity, normal chest rise b/l    HEART:  No visible thrills    Lungs:  Normal expansion     ABD: Soft/NT, non rigid mildly distended    EXT: No cyanosis/clubbing/edema, normal peripheral pulses    Skin: No rashes or ulcers, no mottling    Neuro: A/O x 3        Medications:  Current Facility-Administered Medications   Medication Dose Route Frequency    methylPREDNISolone (PF) (SOLU-MEDROL) injection 40 mg  40 mg IntraVENous Q8H    sodium chloride (OCEAN) 0.65 % nasal squeeze bottle 2 Spray  2 Spray Both Nostrils Q2H PRN    fluticasone propionate (FLONASE) 50 mcg/actuation nasal spray 2 Spray  2 Spray Both Nostrils QHS    insulin pump (PATIENT SUPPLIED)   SubCUTAneous PRN    sodium chloride (NS) flush 5-10 mL  5-10 mL IntraVENous PRN    sodium chloride (NS) flush 5-40 mL  5-40 mL IntraVENous Q8H    sodium chloride (NS) flush 5-40 mL  5-40 mL IntraVENous PRN    acetaminophen (TYLENOL) tablet 650 mg  650 mg Oral Q6H PRN    Or    acetaminophen (TYLENOL) suppository 650 mg  650 mg Rectal Q6H PRN    polyethylene glycol (MIRALAX) packet 17 g  17 g Oral DAILY PRN    promethazine (PHENERGAN) tablet 12.5 mg  12.5 mg Oral Q6H PRN    Or    ondansetron (ZOFRAN) injection 4 mg  4 mg IntraVENous Q6H PRN    cholecalciferol (VITAMIN D3) (1000 Units /25 mcg) tablet 2,000 Units  2,000 Units Oral DAILY    guaiFENesin-dextromethorphan (ROBITUSSIN DM) 100-10 mg/5 mL syrup 5 mL  5 mL Oral Q4H PRN    benzonatate (TESSALON) capsule 200 mg  200 mg Oral TID PRN    therapeutic multivitamin (THERAGRAN) tablet 1 Tab  1 Tab Oral DAILY    enoxaparin (LOVENOX) injection 40 mg  40 mg SubCUTAneous Q12H    glucose chewable tablet 16 g  4 Tab Oral PRN    dextrose (D50W) injection syrg 12.5-25 g  25-50 mL IntraVENous PRN    glucagon (GLUCAGEN) injection 1 mg  1 mg IntraMUSCular PRN       Labs:  ABG No results for input(s): PHI, PCO2I, PO2I, HCO3I, SO2I, FIO2I in the last 72 hours.      CBC Recent Labs     04/16/21  0515 04/15/21  0724 04/14/21  0501   WBC 11.5* 8.7 5.9   HGB 13.3 12.8 12.6   HCT 39.8 37.6 37.8    228 180   MCV 82.2 81.2 83.4   MCH 27.5 27.6 27.8 Metabolic  Panel Recent Labs     04/16/21  0515 04/15/21  0724 04/14/21  0501    135* 132*   K 3.7 3.5 4.0    103 98   CO2 25 25 22   * 118* 305*   BUN 29* 25* 26*   CREA 0.97 0.87 1.13   CA 8.8 8.9 9.1   ALB 3.0* 2.9* 3.2*   * 176* 192*        Pertinent Labs                Jordan Ellis PA-C  4/16/2021

## 2021-04-28 ENCOUNTER — TELEPHONE (OUTPATIENT)
Dept: FAMILY MEDICINE CLINIC | Facility: CLINIC | Age: 81
End: 2021-04-28

## 2021-04-28 DIAGNOSIS — B02.9 HERPES ZOSTER WITHOUT COMPLICATION: ICD-10-CM

## 2021-04-28 DIAGNOSIS — M54.32 SCIATICA OF LEFT SIDE: Primary | ICD-10-CM

## 2021-04-28 RX ORDER — GABAPENTIN 100 MG/1
100 CAPSULE ORAL 3 TIMES DAILY
Qty: 90 CAPSULE | Refills: 0 | Status: SHIPPED | OUTPATIENT
Start: 2021-04-28 | End: 2021-05-27

## 2021-04-28 RX ORDER — PREDNISONE 20 MG/1
40 TABLET ORAL DAILY
Qty: 10 TABLET | Refills: 0 | Status: SHIPPED | OUTPATIENT
Start: 2021-04-28 | End: 2021-05-03

## 2021-04-28 NOTE — TELEPHONE ENCOUNTER
Pain is from her back radiates down her left butt cheek all the way down her leg  Started Monday night she has not been sleeping because of it

## 2021-04-28 NOTE — TELEPHONE ENCOUNTER
Can start Prednisone 40 mg daily x 5 days, gabapentin 100 mg tid for pain  Medication sent to pharmacy  Fup if symptoms worsen

## 2021-04-28 NOTE — TELEPHONE ENCOUNTER
Patient called and wanted the doctor to know that she is having Sciatica pain  She wants to know if there is something that the doctor can prescribe for the pain  She is taking tylenol but it is not helping  I offered her an appointment but she just wanted me to put a note in

## 2021-05-05 ENCOUNTER — TELEPHONE (OUTPATIENT)
Dept: FAMILY MEDICINE CLINIC | Facility: CLINIC | Age: 81
End: 2021-05-05

## 2021-05-05 NOTE — TELEPHONE ENCOUNTER
Patients daughter in law called and advised patient finished her prednisone and the pain is back still going down leg worst pain is to the calf and top of foot  Per Dr Yousif Junior with history should go to the ER  Patient refused when I was speaking to Jasvir Darling  Patient was scheduled with Dr Yousif Junior on Friday and advised Tylenol until then  If pain is worse she was advised ER

## 2021-05-07 ENCOUNTER — OFFICE VISIT (OUTPATIENT)
Dept: FAMILY MEDICINE CLINIC | Facility: CLINIC | Age: 81
End: 2021-05-07
Payer: MEDICARE

## 2021-05-07 VITALS
HEART RATE: 81 BPM | HEIGHT: 61 IN | DIASTOLIC BLOOD PRESSURE: 62 MMHG | WEIGHT: 176 LBS | RESPIRATION RATE: 16 BRPM | SYSTOLIC BLOOD PRESSURE: 130 MMHG | OXYGEN SATURATION: 98 % | BODY MASS INDEX: 33.23 KG/M2

## 2021-05-07 DIAGNOSIS — R45.89 DEPRESSED MOOD: ICD-10-CM

## 2021-05-07 DIAGNOSIS — E11.42 DIABETIC POLYNEUROPATHY ASSOCIATED WITH TYPE 2 DIABETES MELLITUS (HCC): ICD-10-CM

## 2021-05-07 DIAGNOSIS — M1A.0720 IDIOPATHIC CHRONIC GOUT OF LEFT ANKLE WITHOUT TOPHUS: ICD-10-CM

## 2021-05-07 DIAGNOSIS — M25.562 ACUTE PAIN OF LEFT KNEE: Primary | ICD-10-CM

## 2021-05-07 DIAGNOSIS — M79.672 LEFT FOOT PAIN: ICD-10-CM

## 2021-05-07 DIAGNOSIS — J96.00 ACUTE RESPIRATORY FAILURE, UNSPECIFIED WHETHER WITH HYPOXIA OR HYPERCAPNIA (HCC): ICD-10-CM

## 2021-05-07 DIAGNOSIS — H10.32 ACUTE BACTERIAL CONJUNCTIVITIS OF LEFT EYE: ICD-10-CM

## 2021-05-07 DIAGNOSIS — E66.01 MORBID OBESITY DUE TO EXCESS CALORIES (HCC): ICD-10-CM

## 2021-05-07 PROCEDURE — 99214 OFFICE O/P EST MOD 30 MIN: CPT | Performed by: FAMILY MEDICINE

## 2021-05-07 RX ORDER — TOBRAMYCIN 3 MG/ML
1 SOLUTION/ DROPS OPHTHALMIC 2 TIMES DAILY
Qty: 5 ML | Refills: 0 | Status: SHIPPED | OUTPATIENT
Start: 2021-05-07 | End: 2022-02-03 | Stop reason: ALTCHOICE

## 2021-05-07 RX ORDER — CIPROFLOXACIN HYDROCHLORIDE 3.5 MG/ML
1 SOLUTION/ DROPS TOPICAL 2 TIMES DAILY
Qty: 5 ML | Refills: 0 | Status: SHIPPED | OUTPATIENT
Start: 2021-05-07 | End: 2021-05-07

## 2021-05-07 RX ORDER — DULOXETIN HYDROCHLORIDE 30 MG/1
30 CAPSULE, DELAYED RELEASE ORAL DAILY
Qty: 30 CAPSULE | Refills: 5 | Status: SHIPPED | OUTPATIENT
Start: 2021-05-07 | End: 2021-06-08 | Stop reason: SDUPTHER

## 2021-05-07 NOTE — PROGRESS NOTES
Subjective:      Patient ID: Ham Price is a 79 y o  female  Conjunctivitis   The current episode started 5 to 7 days ago  The onset was gradual  The problem has been unchanged  The problem is mild  Associated symptoms include eye itching, eye discharge and eye redness  Pertinent negatives include no fever, no congestion, no headaches and no rhinorrhea  The eye pain is mild  The left eye is affected  The eye pain is not associated with movement  The eyelid exhibits swelling  Patient is here reporting pain in her left leg  The majority of pain is in her left ankle and left knee  Symptoms are chronic, patient reports recent worsening of the symptoms for the past few days  X-ray in her chart from 2019 reveals moderate tri compartment osteoarthritis of the left knee with narrowing and spurring  Patient has received intra-articular steroid injections in the past   She also follows up with Dr Krystal Lyons for foot pain  Patient has been on Cymbalta 20 milligram, which has helped stabilize her mood patient has been taking the medication consistently now  She also has a past history of gout  Uric acid was stable on allopurinol, but it was stopped due to concerns about her kidney function  Patient has not had any acute flare up of gout  Will recheck her uric acid, since  she is due for metabolic labs now       Past Medical History:   Diagnosis Date    Anxiety     Arthritis     joints    Cataract     Disease of thyroid gland     Eczema     GERD (gastroesophageal reflux disease)     Gout     Heart failure (HCC)     Hepatitis     Hiatal hernia     Hypertension     Onychomycosis     last assessed: 4/29/16    Orthopnea     resolved: 1/8/16    Pseudogout of left wrist     Sleep apnea     wears CPAP    Stroke [facility])        Family History   Problem Relation Age of Onset    Diabetes Mother     Coronary artery disease Mother     Arthritis Mother     Hypertension Mother     Hypertension Father    Constance Alvarado Diabetes Brother     Diabetes Son     Arthritis Family        Past Surgical History:   Procedure Laterality Date    ABDOMINAL SURGERY          BRAIN HEMATOMA EVACUATION Right 2020    Procedure: Right decompressive craniectomy and evacuation of intraparenchymal hematoma; Surgeon: Dereck Emerson MD;  [facility];  Service: Neurosurgery    BREAST SURGERY Right     cyst removal    CARPAL TUNNEL RELEASE Left     CARPAL TUNNEL RELEASE Right     CATARACT EXTRACTION       SECTION  1960    x1    COLONOSCOPY N/A 2018    Procedure: COLONOSCOPY;  Surgeon: Raphael Portillo MD;  [facility] GI LAB; Service: Gastroenterology    DILATION AND CURETTAGE OF UTERUS  1967    EYE SURGERY Left 2006    cataracts    HERNIA REPAIR      umbilical hernia    INCISIONAL HERNIA REPAIR      incarcerated    KNEE ARTHROSCOPY Right     AK REPLACE SKULL PLATE/FLAP Right     Procedure: right frontotemporal replacement cranioplasty;  Surgeon: Dereck Emerson MD;  [facility];  Service: Neurosurgery    AK XCAPSL CTRC RMVL INSJ IO LENS PROSTH W/O ECP Right 3/27/2017    Procedure: EXTRACTION EXTRACAPSULAR CATARACT PHACO INTRAOCULAR LENS (IOL); Surgeon: Michael Marquez MD;  Location[facility] MAIN OR;  Service: Ophthalmology        reports that she has never smoked  She has never used smokeless tobacco  She reports current alcohol use  She reports that she does not use drugs        Current Outpatient Medications:     amLODIPine (NORVASC) 10 mg tablet, Take 1 tablet (10 mg total) by mouth daily, Disp: 90 tablet, Rfl: 1    atorvastatin (LIPITOR) 40 mg tablet, Take 1 tablet (40 mg total) by mouth every evening, Disp: 90 tablet, Rfl: 1    bumetanide (BUMEX) 2 mg tablet, Take 1 tablet (2 mg total) by mouth daily, Disp: 135 tablet, Rfl: 3    gabapentin (NEURONTIN) 100 mg capsule, Take 1 capsule (100 mg total) by mouth 3 (three) times a day, Disp: 90 capsule, Rfl: 0   levothyroxine 88 mcg tablet, Take 1 tablet (88 mcg total) by mouth daily, Disp: 90 tablet, Rfl: 1    losartan (COZAAR) 25 mg tablet, Take 2 tab in AM and 1 tab in PM, Disp: 180 tablet, Rfl: 3    omeprazole (PriLOSEC) 20 mg delayed release capsule, Take 1 capsule (20 mg total) by mouth every morning, Disp: 90 capsule, Rfl: 1    spironolactone (ALDACTONE) 25 mg tablet, Take 0 5 tablets (12 5 mg total) by mouth daily, Disp: 45 tablet, Rfl: 3    ciprofloxacin (CILOXAN) 0 3 % ophthalmic solution, Administer 1 drop into the left eye 2 (two) times a day for 7 days, Disp: 5 mL, Rfl: 0    Diclofenac Sodium (VOLTAREN) 1 %, Apply 2 g topically 4 (four) times a day, Disp: 100 g, Rfl: 1    The following portions of the patient's history were reviewed and updated as appropriate: allergies, current medications, past family history, past medical history, past social history, past surgical history and problem list     Review of Systems   Constitutional: Negative  Negative for fever  HENT: Negative for congestion and rhinorrhea  Eyes: Positive for discharge, redness and itching  Respiratory: Negative  Negative for shortness of breath  Cardiovascular: Negative  Negative for chest pain and palpitations  Gastrointestinal: Negative  Endocrine: Negative  Genitourinary: Negative  Musculoskeletal: Negative  Negative for myalgias  Left knee, left ankle pain   Neurological: Negative  Negative for headaches  Psychiatric/Behavioral: Negative  Objective:    /62 (BP Location: Left arm, Patient Position: Sitting, Cuff Size: Standard)   Pulse 81   Resp 16   Ht 5' 1" (1 549 m)   Wt 79 8 kg (176 lb)   SpO2 98%   BMI 33 25 kg/m²      Physical Exam  Constitutional:       Appearance: She is well-developed  HENT:      Head: Normocephalic  Right Ear: External ear normal       Left Ear: External ear normal    Eyes:      Pupils: Pupils are equal, round, and reactive to light  Comments: Left eye congestion, mild lid swelling   Neck:      Musculoskeletal: Normal range of motion and neck supple  Cardiovascular:      Rate and Rhythm: Normal rate and regular rhythm  Pulmonary:      Effort: Pulmonary effort is normal       Breath sounds: Normal breath sounds  Abdominal:      General: Bowel sounds are normal       Palpations: Abdomen is soft  Musculoskeletal: Normal range of motion  General: Tenderness (left ankle, hypersensitivity over left shin  ) present  No swelling or deformity  Right lower leg: No edema  Left lower leg: No edema  Neurological:      Mental Status: She is alert and oriented to person, place, and time  Psychiatric:         Behavior: Behavior normal          Judgment: Judgment normal            No results found for this or any previous visit (from the past 1008 hour(s))  Assessment/Plan:         Problem List Items Addressed This Visit        Endocrine    Diabetic polyneuropathy associated with type 2 diabetes mellitus ([facility] [address] )       Lab Results   Component Value Date    HGBA1C 5 7 (H) 12/08/2020     Continue with Neurontin 100 milligram t i d  Respiratory    RESOLVED: Acute respiratory failure (HCC)       Other    Chronic gout without tophus     Check Uric acid  Currently not on any medication  No recent flares  Relevant Orders    Uric acid    Acute pain of left knee - Primary     Will refer to [facility]  Relevant Medications    Diclofenac Sodium (VOLTAREN) 1 %    Other Relevant Orders    Ambulatory referral to Orthopedic Surgery    Acute bacterial conjunctivitis of left eye     Start tobramycin eye drops            Relevant Medications    tobramycin (TOBREX) 0 3 % SOLN      Other Visit Diagnoses     Morbid obesity due to excess calories (HCC)        Left foot pain        Relevant Medications    Diclofenac Sodium (VOLTAREN) 1 %    Other Relevant Orders    Ambulatory referral to Podiatry    Depressed mood Relevant Medications    DULoxetine (CYMBALTA) 30 mg delayed release capsule

## 2021-05-07 NOTE — ASSESSMENT & PLAN NOTE
Lab Results   Component Value Date    HGBA1C 5 7 (H) 12/08/2020     Continue with Neurontin 100 milligram t i d

## 2021-05-14 ENCOUNTER — OFFICE VISIT (OUTPATIENT)
Dept: OBGYN CLINIC | Facility: CLINIC | Age: 81
End: 2021-05-14
Payer: MEDICARE

## 2021-05-14 ENCOUNTER — APPOINTMENT (OUTPATIENT)
Dept: RADIOLOGY | Facility: CLINIC | Age: 81
End: 2021-05-14
Payer: MEDICARE

## 2021-05-14 VITALS
SYSTOLIC BLOOD PRESSURE: 120 MMHG | DIASTOLIC BLOOD PRESSURE: 60 MMHG | WEIGHT: 174 LBS | HEART RATE: 96 BPM | BODY MASS INDEX: 32.85 KG/M2 | HEIGHT: 61 IN

## 2021-05-14 DIAGNOSIS — M54.32 SCIATICA OF LEFT SIDE: ICD-10-CM

## 2021-05-14 DIAGNOSIS — M25.562 ACUTE PAIN OF LEFT KNEE: ICD-10-CM

## 2021-05-14 DIAGNOSIS — M17.12 PRIMARY OSTEOARTHRITIS OF LEFT KNEE: Primary | ICD-10-CM

## 2021-05-14 DIAGNOSIS — M17.11 PRIMARY OSTEOARTHRITIS OF RIGHT KNEE: ICD-10-CM

## 2021-05-14 DIAGNOSIS — N18.30 STAGE 3 CHRONIC KIDNEY DISEASE, UNSPECIFIED WHETHER STAGE 3A OR 3B CKD (HCC): ICD-10-CM

## 2021-05-14 PROCEDURE — 20610 DRAIN/INJ JOINT/BURSA W/O US: CPT | Performed by: ORTHOPAEDIC SURGERY

## 2021-05-14 PROCEDURE — 73562 X-RAY EXAM OF KNEE 3: CPT

## 2021-05-14 PROCEDURE — 99214 OFFICE O/P EST MOD 30 MIN: CPT | Performed by: ORTHOPAEDIC SURGERY

## 2021-05-14 PROCEDURE — 73560 X-RAY EXAM OF KNEE 1 OR 2: CPT

## 2021-05-14 RX ORDER — LIDOCAINE HYDROCHLORIDE 10 MG/ML
4 INJECTION, SOLUTION INFILTRATION; PERINEURAL
Status: COMPLETED | OUTPATIENT
Start: 2021-05-14 | End: 2021-05-14

## 2021-05-14 RX ORDER — DEXAMETHASONE SODIUM PHOSPHATE 100 MG/10ML
40 INJECTION INTRAMUSCULAR; INTRAVENOUS
Status: COMPLETED | OUTPATIENT
Start: 2021-05-14 | End: 2021-05-14

## 2021-05-14 RX ADMIN — DEXAMETHASONE SODIUM PHOSPHATE 40 MG: 100 INJECTION INTRAMUSCULAR; INTRAVENOUS at 12:28

## 2021-05-14 RX ADMIN — LIDOCAINE HYDROCHLORIDE 4 ML: 10 INJECTION, SOLUTION INFILTRATION; PERINEURAL at 12:28

## 2021-05-14 NOTE — PROGRESS NOTES
Assessment/Plan:  1  Primary osteoarthritis of right knee     2  Primary osteoarthritis of left knee  Ambulatory referral to Orthopedic Surgery    XR knee 3 vw left non injury   3  Stage 3 chronic kidney disease, unspecified whether stage 3a or 3b CKD ([facility] [address] )     4  Sciatica of left side         Scribe Attestation    I,:  Argentina Lisa am acting as a scribe while in the presence of the attending physician :       I,:  Kenyon Grey MD personally performed the services described in this documentation    as scribed in my presence :             Rosalind upon examination and review of the x-rays of the left knee does demonstrate severe tricompartmental osteoarthritis  I did remark that the medial knee pain that she is experiencing is associated with the severe arthritic change the medial compartment  She does have similar findings in the right knee as demonstrated on x-rays  Additionally, I did remark that she could potentially be experiencing symptoms associated with sciatica with the pain from the buttock area down to the foot and ankle  I do find reasonable as per her request to receive a steroid injection into her left knee as this did provide her with symptomatic relief in the past   She tolerated the injection well with no complications  Post injection instructions were given  She may continue with activities daily living as tolerated with no restrictions  I did note that it is not advisable for her to take NSAIDs due to her history of renal issues and water pills  I did encourage her to restart taking her water pills as she does demonstrate swelling to the lower extremities today  She may follow up with me on an as-needed basis  Large joint arthrocentesis: L knee  Universal Protocol:  Consent: Verbal consent obtained    Risks and benefits: risks, benefits and alternatives were discussed  Consent given by: patient  Time out: Immediately prior to procedure a "time out" was called to verify the correct patient, procedure, equipment, support staff and site/side marked as required  Timeout called at: 5/14/2021 11:57 AM   Patient understanding: patient states understanding of the procedure being performed  Patient consent: the patient's understanding of the procedure matches consent given  Site marked: the operative site was marked  Radiology Images displayed and confirmed  If images not available, report reviewed: imaging studies available  Patient identity confirmed: verbally with patient    Supporting Documentation  Indications: pain   Procedure Details  Location: knee - L knee  Preparation: Patient was prepped and draped in the usual sterile fashion  Needle size: 22 G  Ultrasound guidance: no  Approach: anterolateral  Medications administered: 4 mL lidocaine 1 %; 40 mg dexamethasone 100 mg/10 mL    Patient tolerance: patient tolerated the procedure well with no immediate complications  Dressing:  Sterile dressing applied          Subjective:   Lynn Nayak is a 79 y o  female who presents to the office today for  Follow-up evaluation of her left knee  She does have a history of severe tricompartmental osteoarthritis  She states she has pain to the anterior medial aspect of her knee during weight-bearing activities  This has been a chronic issue as she was evaluated last year for similar pain  She also remarks on pain into the lumbar spine radiating down into the lower leg consistent with sciatica  She has had some swelling into her lower extremities as she admits that she has not taken her water pills the last 2 days  She is unable to take NSAIDs due to her history of renal issues, and water retention  Today she denies any distal paresthesias  Review of Systems   Constitutional: Negative for chills, fever and unexpected weight change  HENT: Negative for hearing loss, nosebleeds and sore throat  Eyes: Negative for pain, redness and visual disturbance     Respiratory: Negative for cough, shortness of breath and wheezing  Cardiovascular: Negative for chest pain, palpitations and leg swelling  Gastrointestinal: Negative for abdominal pain, nausea and vomiting  Endocrine: Negative for polydipsia and polyuria  Genitourinary: Negative for dysuria and hematuria  Musculoskeletal: Positive for arthralgias and myalgias  See HPI   Skin: Negative for rash and wound  Neurological: Negative for dizziness, numbness and headaches  Psychiatric/Behavioral: Negative for decreased concentration and suicidal ideas  The patient is not nervous/anxious  Past Medical History:   Diagnosis Date    Anxiety     Arthritis     joints    Cataract     Disease of thyroid gland     Eczema     GERD (gastroesophageal reflux disease)     Gout     Heart failure (HCC)     Hepatitis     Hiatal hernia     Hypertension     Onychomycosis     last assessed: 16    Orthopnea     resolved: 16    Pseudogout of left wrist     Sleep apnea     wears CPAP    Stroke [facility])        Past Surgical History:   Procedure Laterality Date    ABDOMINAL SURGERY          BRAIN HEMATOMA EVACUATION Right 2020    Procedure: Right decompressive craniectomy and evacuation of intraparenchymal hematoma; Surgeon: Tala Snyder MD;  [facility];  Service: Neurosurgery    BREAST SURGERY Right     cyst removal    CARPAL TUNNEL RELEASE Left     CARPAL TUNNEL RELEASE Right     CATARACT EXTRACTION       SECTION  1960    x1    COLONOSCOPY N/A 2018    Procedure: COLONOSCOPY;  Surgeon: Leanna Harper MD;  Location: [place] [address] GI LAB;   Service: Gastroenterology    DILATION AND CURETTAGE OF UTERUS  1967    EYE SURGERY Left 2006    cataracts    HERNIA REPAIR      umbilical hernia    INCISIONAL HERNIA REPAIR      incarcerated    KNEE ARTHROSCOPY Right     AK REPLACE SKULL PLATE/FLAP Right 11    Procedure: right frontotemporal replacement cranioplasty;  Surgeon: Jolynn Wills Nick Arredondo MD;  Location:  MAIN OR;  Service: Neurosurgery    OK XCAPSL CTRC RMVL INSJ IO LENS PROSTH W/O ECP Right 3/27/2017    Procedure: EXTRACTION EXTRACAPSULAR CATARACT PHACO INTRAOCULAR LENS (IOL); Surgeon: Scot Arthur MD;  [facility] MAIN OR;  Service: Ophthalmology       Family History   Problem Relation Age of Onset    Diabetes Mother     Coronary artery disease Mother     Arthritis Mother     Hypertension Mother     Hypertension Father     Diabetes Brother     Diabetes Son     Arthritis Family        Social History     Occupational History    Not on file   Tobacco Use    Smoking status: Never Smoker    Smokeless tobacco: Never Used   Substance and Sexual Activity    Alcohol use: Yes     Alcohol/week: 0 0 standard drinks     Frequency: Monthly or less     Drinks per session: 1 or 2     Binge frequency: Never     Comment: Rare      Drug use: Never    Sexual activity: Not Currently     Birth control/protection: Post-menopausal         Current Outpatient Medications:     amLODIPine (NORVASC) 10 mg tablet, Take 1 tablet (10 mg total) by mouth daily, Disp: 90 tablet, Rfl: 1    atorvastatin (LIPITOR) 40 mg tablet, Take 1 tablet (40 mg total) by mouth every evening, Disp: 90 tablet, Rfl: 1    bumetanide (BUMEX) 2 mg tablet, Take 1 tablet (2 mg total) by mouth daily, Disp: 135 tablet, Rfl: 3    Diclofenac Sodium (VOLTAREN) 1 %, Apply 2 g topically 4 (four) times a day (Patient not taking: Reported on 5/14/2021), Disp: 100 g, Rfl: 1    DULoxetine (CYMBALTA) 30 mg delayed release capsule, Take 1 capsule (30 mg total) by mouth daily, Disp: 30 capsule, Rfl: 5    gabapentin (NEURONTIN) 100 mg capsule, Take 1 capsule (100 mg total) by mouth 3 (three) times a day, Disp: 90 capsule, Rfl: 0    levothyroxine 88 mcg tablet, Take 1 tablet (88 mcg total) by mouth daily, Disp: 90 tablet, Rfl: 1    losartan (COZAAR) 25 mg tablet, Take 2 tab in AM and 1 tab in PM, Disp: 180 tablet, Rfl: 3   omeprazole (PriLOSEC) 20 mg delayed release capsule, Take 1 capsule (20 mg total) by mouth every morning, Disp: 90 capsule, Rfl: 1    spironolactone (ALDACTONE) 25 mg tablet, Take 0 5 tablets (12 5 mg total) by mouth daily, Disp: 45 tablet, Rfl: 3    tobramycin (TOBREX) 0 3 % SOLN, Administer 1 drop into the left eye 2 (two) times a day, Disp: 5 mL, Rfl: 0    No Known Allergies    Objective:  Vitals:    05/14/21 1105   BP: 120/60   Pulse: 96       Left Knee Exam     Tenderness   The patient is experiencing tenderness in the medial joint line  Range of Motion   Extension: 0   Flexion: 110     Tests   Varus: negative Valgus: negative  Drawer:  Anterior - negative     Posterior - negative    Other   Erythema: absent  Scars: absent  Sensation: normal  Pulse: present  Swelling: none  Effusion: no effusion present          Observations   Left Knee   Negative for effusion  Physical Exam  Vitals signs reviewed  HENT:      Head: Normocephalic and atraumatic  Eyes:      General:         Right eye: No discharge  Left eye: No discharge  Conjunctiva/sclera: Conjunctivae normal       Pupils: Pupils are equal, round, and reactive to light  Neck:      Musculoskeletal: Normal range of motion and neck supple  Cardiovascular:      Rate and Rhythm: Normal rate  Pulmonary:      Effort: Pulmonary effort is normal  No respiratory distress  Musculoskeletal:      Left knee: She exhibits no effusion  Comments: As noted in HPI   Skin:     General: Skin is warm and dry  Neurological:      Mental Status: She is alert and oriented to person, place, and time  I have personally reviewed pertinent films in PACS and my interpretation is as follows:    X-rays of the left knee demonstrates severe tricompartmental osteoarthritis with subchondral sclerosis and osteophyte formation at the medial, lateral, patellofemoral compartments  No acute fractures demonstrated    No lytic or blastic lesions

## 2021-05-16 ENCOUNTER — HOSPITAL ENCOUNTER (EMERGENCY)
Facility: HOSPITAL | Age: 81
Discharge: HOME | End: 2021-05-16
Attending: EMERGENCY MEDICINE
Payer: MEDICARE

## 2021-05-16 ENCOUNTER — APPOINTMENT (EMERGENCY)
Dept: CT IMAGING | Facility: HOSPITAL | Age: 81
End: 2021-05-16
Payer: MEDICARE

## 2021-05-16 VITALS
BODY MASS INDEX: 32.78 KG/M2 | WEIGHT: 173.5 LBS | SYSTOLIC BLOOD PRESSURE: 167 MMHG | TEMPERATURE: 97.9 F | OXYGEN SATURATION: 99 % | HEART RATE: 74 BPM | RESPIRATION RATE: 16 BRPM | DIASTOLIC BLOOD PRESSURE: 66 MMHG

## 2021-05-16 DIAGNOSIS — M54.42 ACUTE LOW BACK PAIN WITH LEFT-SIDED SCIATICA: Primary | ICD-10-CM

## 2021-05-16 DIAGNOSIS — M43.10 ANTEROLISTHESIS: ICD-10-CM

## 2021-05-16 PROCEDURE — 99284 EMERGENCY DEPT VISIT MOD MDM: CPT

## 2021-05-16 PROCEDURE — 99284 EMERGENCY DEPT VISIT MOD MDM: CPT | Performed by: PHYSICIAN ASSISTANT

## 2021-05-16 PROCEDURE — G1004 CDSM NDSC: HCPCS

## 2021-05-16 PROCEDURE — 72131 CT LUMBAR SPINE W/O DYE: CPT

## 2021-05-16 RX ORDER — ACETAMINOPHEN 325 MG/1
650 TABLET ORAL ONCE
Status: COMPLETED | OUTPATIENT
Start: 2021-05-16 | End: 2021-05-16

## 2021-05-16 RX ORDER — PREDNISONE 10 MG/1
TABLET ORAL
Qty: 20 TABLET | Refills: 0 | Status: SHIPPED | OUTPATIENT
Start: 2021-05-16 | End: 2021-05-24

## 2021-05-16 RX ORDER — LIDOCAINE 50 MG/G
1 PATCH TOPICAL ONCE
Status: DISCONTINUED | OUTPATIENT
Start: 2021-05-16 | End: 2021-05-16 | Stop reason: HOSPADM

## 2021-05-16 RX ORDER — OXYCODONE HYDROCHLORIDE 5 MG/1
5 TABLET ORAL ONCE
Status: COMPLETED | OUTPATIENT
Start: 2021-05-16 | End: 2021-05-16

## 2021-05-16 RX ORDER — PREDNISONE 20 MG/1
40 TABLET ORAL ONCE
Status: COMPLETED | OUTPATIENT
Start: 2021-05-16 | End: 2021-05-16

## 2021-05-16 RX ADMIN — ACETAMINOPHEN 650 MG: 325 TABLET, FILM COATED ORAL at 11:20

## 2021-05-16 RX ADMIN — OXYCODONE HYDROCHLORIDE 5 MG: 5 TABLET ORAL at 12:29

## 2021-05-16 RX ADMIN — LIDOCAINE 5% 1 PATCH: 700 PATCH TOPICAL at 11:19

## 2021-05-16 RX ADMIN — PREDNISONE 40 MG: 20 TABLET ORAL at 14:05

## 2021-05-16 NOTE — DISCHARGE INSTRUCTIONS
Sciatica   WHAT YOU NEED TO KNOW:   Sciatica is a condition that causes pain along your sciatic nerve  The sciatic nerve runs from your spine through both sides of your buttocks  It then runs down the back of your thigh, into your lower leg and foot  Your sciatic nerve may be compressed, inflamed, irritated, or stretched  DISCHARGE INSTRUCTIONS:   Medicines:   · NSAIDs:  These medicines decrease swelling and pain  NSAIDs are available without a doctor's order  Ask your healthcare provider which medicine is right for you  Ask how much to take and when to take it  Take as directed  NSAIDs can cause stomach bleeding or kidney problems if not taken correctly  · Acetaminophen: This medicine decreases pain  Acetaminophen is available without a doctor's order  Ask how much to take and when to take it  Follow directions  Acetaminophen can cause liver damage if not taken correctly  · Muscle relaxers  help decrease pain and muscle spasms  · Take your medicine as directed  Contact your healthcare provider if you think your medicine is not helping or if you have side effects  Tell him of her if you are allergic to any medicine  Keep a list of the medicines, vitamins, and herbs you take  Include the amounts, and when and why you take them  Bring the list or the pill bottles to follow-up visits  Carry your medicine list with you in case of an emergency  Follow up with your healthcare provider as directed:  Write down your questions so you remember to ask them during your visits  Manage your symptoms:   · Activity:  Decrease your activity  Do not lift heavy objects or twist your back for at least 6 weeks  Slowly return to your usual activity  · Ice:  Ice helps decrease swelling and pain  Ice may also help prevent tissue damage  Use an ice pack, or put crushed ice in a plastic bag  Cover it with a towel and place it on your low back or leg for 15 to 20 minutes every hour or as directed      · Heat:  Heat helps decrease pain and muscle spasms  Apply heat on the area for 20 to 30 minutes every 2 hours for as many days as directed  · Physical therapy:  You may need to see physical therapist to teach you exercises to help improve movement and strength, and to decrease pain  An occupational therapist teaches you skills to help with your daily activities  · Use assistive devices if directed: You may need to wear back support, such as a back brace  You may need crutches, a cane, or a walker to decrease stress on your lower back and leg muscles  Ask your healthcare provider for more information about assistive devices and how to use them correctly  Self-care:   · Avoid pressure on your back and legs:  Do not  lift heavy objects, or stand or sit for long periods of time  · Lift objects safely:  Keep your back straight and bend your knees when you  an object  Do not bend or twist your back when you lift  · Maintain a healthy weight:  Ask your healthcare provider how much you should weigh  Ask him to help you create a weight loss plan if you are overweight  · Exercise:  Ask your healthcare provider about the best stretching, warmup, and exercise plan for you  Contact your healthcare provider if:   · You have pain in your lower back at night or when resting  · You have pain in your lower back with numbness below the knee  · You have weakness in one leg only  · You have questions or concerns about your condition or care  Return to the emergency department if:   · You have trouble holding back your urine or bowel movements  · You have weakness in both legs  · You have numbness in your groin or buttocks  © Copyright [address] Information is for End User's use only and may not be sold, redistributed or otherwise used for commercial purposes   All illustrations and images included in CareNotes® are the copyrighted property of A D A M [employer]  or Slick Chan above information is an  only  It is not intended as medical advice for individual conditions or treatments  Talk to your doctor, nurse or pharmacist before following any medical regimen to see if it is safe and effective for you

## 2021-05-16 NOTE — ED NOTES
Pt utilized call bell and reported she has had no pain relief  This RN helped reposition patient onto side with pillows for support to help alleviate pain  Will make provider aware        Nikolas Carvalho RN  05/16/21 1945

## 2021-05-16 NOTE — ED PROVIDER NOTES
History  Chief Complaint   Patient presents with    Back Pain     Pt presents to the ED with c/o left lower back pain that radiates down left leg  Pt has been dealing with this for the past month, took 5 days of prednisone and now pain is back  Pt also c/o of new pain in her right lower back now  Brigitte Shaw is a 79 y o  female with a PMHx of CVA, CFS, HTN and GERD who presents to the ED with complaints of lower back pain x 3 weeks  Patient states she started having pain in the left lower side of the back/buttocks and sharp shooting pains down the LLE  Patient states she noticed pain of the anterior ankle  Patient was placed on Prednisone with improvement of symptoms but noticed returning of left leg pain a few days ago  Patient went to her private orthopedics who believed this was most likely a combination of sciatica and arthritis and patient had a steroid injection into the left knee  Patient states she is still having difficulty ambulating secondary to the pain  Denies injury, fall, numbness, tingling, weakness, urinary or bowel incontinence, saddle anesthesia, dysuria, urinary frequency, urinary urgency, hematuria, abdominal pain, chest pain, shortness of breath, fever, chills  Patient states she has previously had injections into her back with Dr Leigha Stephen without alleviation of symptoms  Patient states she has a previous MRI "2-6 years ago" which was significant for stenosis  History provided by:  Patient and relative  Back Pain  Location:  Sacro-iliac joint  Duration:  3 weeks  Associated symptoms: leg pain    Associated symptoms: no abdominal pain, no abdominal swelling, no bladder incontinence, no bowel incontinence, no chest pain, no dysuria, no fever, no headaches, no numbness, no paresthesias, no pelvic pain, no perianal numbness, no tingling, no weakness and no weight loss        Prior to Admission Medications   Prescriptions Last Dose Informant Patient Reported? Taking? DULoxetine (CYMBALTA) 30 mg delayed release capsule   No No   Sig: Take 1 capsule (30 mg total) by mouth daily   Diclofenac Sodium (VOLTAREN) 1 %   No No   Sig: Apply 2 g topically 4 (four) times a day   Patient not taking: Reported on 2021   amLODIPine (NORVASC) 10 mg tablet  Self No No   Sig: Take 1 tablet (10 mg total) by mouth daily   atorvastatin (LIPITOR) 40 mg tablet  Self No No   Sig: Take 1 tablet (40 mg total) by mouth every evening   bumetanide (BUMEX) 2 mg tablet  Self No No   Sig: Take 1 tablet (2 mg total) by mouth daily   gabapentin (NEURONTIN) 100 mg capsule   No No   Sig: Take 1 capsule (100 mg total) by mouth 3 (three) times a day   levothyroxine 88 mcg tablet  Self No No   Sig: Take 1 tablet (88 mcg total) by mouth daily   losartan (COZAAR) 25 mg tablet  Self No No   Sig: Take 2 tab in AM and 1 tab in PM   omeprazole (PriLOSEC) 20 mg delayed release capsule  Self No No   Sig: Take 1 capsule (20 mg total) by mouth every morning   spironolactone (ALDACTONE) 25 mg tablet  Self No No   Sig: Take 0 5 tablets (12 5 mg total) by mouth daily   tobramycin (TOBREX) 0 3 % SOLN   No No   Sig: Administer 1 drop into the left eye 2 (two) times a day      Facility-Administered Medications: None       Past Medical History:   Diagnosis Date    Anxiety     Arthritis     joints    Cataract     Disease of thyroid gland     Eczema     GERD (gastroesophageal reflux disease)     Gout     Heart failure (HCC)     Hepatitis     Hiatal hernia     Hypertension     Onychomycosis     last assessed: 16    Orthopnea     resolved: 16    Pseudogout of left wrist     Sleep apnea     wears CPAP    Stroke [facility])        Past Surgical History:   Procedure Laterality Date    ABDOMINAL SURGERY          BRAIN HEMATOMA EVACUATION Right 2020    Procedure: Right decompressive craniectomy and evacuation of intraparenchymal hematoma;   Surgeon: Oxana Parsons MD;  [facility];  Service: Neurosurgery    BREAST SURGERY Right     cyst removal    CARPAL TUNNEL RELEASE Left     CARPAL TUNNEL RELEASE Right     CATARACT EXTRACTION       SECTION  1960    x1    COLONOSCOPY N/A 2018    Procedure: COLONOSCOPY;  Surgeon: Jolynn Moore MD;  Location: [place] [address] GI LAB; Service: Gastroenterology    DILATION AND CURETTAGE OF UTERUS  1967    EYE SURGERY Left 2006    cataracts    HERNIA REPAIR      umbilical hernia    INCISIONAL HERNIA REPAIR      incarcerated    KNEE ARTHROSCOPY Right     MS REPLACE SKULL PLATE/FLAP Right 4889    Procedure: right frontotemporal replacement cranioplasty;  Surgeon: Reilly Du MD;  Location:  MAIN OR;  Service: Neurosurgery    MS XCAPSL CTRC RMVL INSJ IO LENS PROSTH W/O ECP Right 3/27/2017    Procedure: EXTRACTION EXTRACAPSULAR CATARACT PHACO INTRAOCULAR LENS (IOL); Surgeon: Anu Jefferson MD;  Location: [facility] MAIN OR;  Service: Ophthalmology       Family History   Problem Relation Age of Onset    Diabetes Mother     Coronary artery disease Mother     Arthritis Mother     Hypertension Mother     Hypertension Father     Diabetes Brother     Diabetes Son     Arthritis Family      I have reviewed and agree with the history as documented  E-Cigarette/Vaping    E-Cigarette Use Never User      E-Cigarette/Vaping Substances    Nicotine No     THC No     CBD No     Flavoring No     Other No     Unknown No      Social History     Tobacco Use    Smoking status: Never Smoker    Smokeless tobacco: Never Used   Substance Use Topics    Alcohol use: Yes     Alcohol/week: 0 0 standard drinks     Frequency: Monthly or less     Drinks per session: 1 or 2     Binge frequency: Never     Comment: Rare   Drug use: Never       Review of Systems   Constitutional: Negative for appetite change, chills, fever, unexpected weight change and weight loss     HENT: Negative for congestion, drooling, ear pain, rhinorrhea, sore throat, trouble swallowing and voice change  Eyes: Negative for pain, discharge, redness and visual disturbance  Respiratory: Negative for cough, shortness of breath, wheezing and stridor  Cardiovascular: Negative for chest pain, palpitations and leg swelling  Gastrointestinal: Negative for abdominal pain, blood in stool, bowel incontinence, constipation, diarrhea, nausea and vomiting  Genitourinary: Negative for bladder incontinence, dysuria, flank pain, frequency, hematuria, pelvic pain and urgency  Musculoskeletal: Positive for back pain  Negative for gait problem, joint swelling, neck pain and neck stiffness  Skin: Negative for color change and rash  Neurological: Negative for dizziness, tingling, seizures, weakness, light-headedness, numbness, headaches and paresthesias  Physical Exam  Physical Exam  Vitals signs and nursing note reviewed  Constitutional:       Appearance: She is well-developed  HENT:      Head: Normocephalic and atraumatic  Nose: Nose normal    Eyes:      Conjunctiva/sclera: Conjunctivae normal       Pupils: Pupils are equal, round, and reactive to light  Cardiovascular:      Rate and Rhythm: Normal rate and regular rhythm  Pulmonary:      Effort: Pulmonary effort is normal       Breath sounds: Normal breath sounds  Abdominal:      General: Abdomen is flat  Bowel sounds are normal       Tenderness: There is no abdominal tenderness  Musculoskeletal:      Lumbar back: She exhibits decreased range of motion and tenderness  She exhibits no spasm  Back:       Right lower leg: Edema present  Left lower leg: Edema present  Skin:     General: Skin is warm and dry  Capillary Refill: Capillary refill takes less than 2 seconds  Neurological:      General: No focal deficit present  Mental Status: She is alert and oriented to person, place, and time  GCS: GCS eye subscore is 4  GCS verbal subscore is 5  GCS motor subscore is 6        Cranial Nerves: Cranial nerves are intact  Sensory: Sensation is intact  Motor: Motor function is intact  Deep Tendon Reflexes: Reflexes are normal and symmetric  Vital Signs  ED Triage Vitals   Temperature Pulse Respirations Blood Pressure SpO2   05/16/21 1107 05/16/21 1107 05/16/21 1107 05/16/21 1107 05/16/21 1107   97 9 °F (36 6 °C) 74 16 167/66 99 %      Temp Source Heart Rate Source Patient Position - Orthostatic VS BP Location FiO2 (%)   05/16/21 1107 05/16/21 1107 -- 05/16/21 1107 --   Oral Monitor  Right arm       Pain Score       05/16/21 1120       9           Vitals:    05/16/21 1107   BP: 167/66   Pulse: 74         Visual Acuity      ED Medications  Medications   lidocaine (LIDODERM) 5 % patch 1 patch (1 patch Topical Medication Applied 5/16/21 1119)   acetaminophen (TYLENOL) tablet 650 mg (650 mg Oral Given 5/16/21 1120)   oxyCODONE (ROXICODONE) IR tablet 5 mg (5 mg Oral Given 5/16/21 1229)   predniSONE tablet 40 mg (40 mg Oral Given 5/16/21 1405)       Diagnostic Studies  Results Reviewed     None                 CT lumbar spine without contrast   Final Result by Abdias Herrera MD (05/16 1307)   1  No acute osseous abnormality  2   Endplate and facet joint degenerative changes scattered throughout the lumbar spine  Grade 1 anterolisthesis of L4 on L5 secondary to degenerative changes  Workstation performed: [id number]                    Procedures  Procedures         ED Course  ED Course as of May 16 1500   Sun May 16, 2021   1225 Patient continues to have back pain which is worse with lying flat  Will attempt to reposition patient and provided additional pain medication  1355 Patient ambulated to and from the bathroom without difficulty  26 Educated patient regarding diagnosis and management  Advised patient to follow up with PCP  Advised patient to RTER for persistent or worsening symptoms                                                 MDM  Number of Diagnoses or Management Options  Acute low back pain with left-sided sciatica: new and requires workup  Anterolisthesis: new and requires workup  Diagnosis management comments: Patient appears well on examination but has point tenderness to the lower left back  Patient is complaining of radicular symptoms which are worse on the left  No history of physical exam symptoms concerning for cauda equina or epidural abscess  CT Lumbar Spine without acute osseous abnormality  Endplate and facet joint degenerative changes scattered throughout the lumbar spine  Grade 1 anterolisthesis of L4 on L5 secondary to degenerative changes  Patient is able to ambulate in the emergency department without difficulty but is still having considerable pain/discomfort with laying flat  Patient did have improvement of symptoms after previous prednisone burst  Will treat with prednisone taper and advise that patient follow-up outpatient with her private Pain/Spine physician  I provided patient with strict RTER precautions  I advised patient follow-up with PCP in 24-48 hours  Patient verbalized understanding  Amount and/or Complexity of Data Reviewed  Tests in the radiology section of CPT®: ordered and reviewed  Review and summarize past medical records: yes    Patient Progress  Patient progress: improved      Disposition  Final diagnoses:   Acute low back pain with left-sided sciatica   Anterolisthesis     Time reflects when diagnosis was documented in both MDM as applicable and the Disposition within this note     Time User Action Codes Description Comment    5/16/2021  1:52 PM Bud Washburn Add [M54 42] Acute low back pain with left-sided sciatica     5/16/2021  1:52  [address], [address] [M43 10] Anterolisthesis       ED Disposition     ED Disposition Condition Date/Time Comment    Discharge Stable Sun May 16, 2021  1:52 PM Adrián Wise discharge to home/self care              Follow-up Information     Follow up With Specialties Details Why Contact Info Additional Information    [facility] Emergency [facility] Emergency Medicine Go to  If symptoms worsen [address] [address] [facility] Emergency Department, [address], [place], [address], [place], [place] [facility] Medicine Call   [address]  [address] [facility]  [phone]       Jorge Luis Laura MD Pain Medicine Schedule an appointment as soon as possible for a visit   [address] [facility]  [phone]             Discharge Medication List as of 5/16/2021  1:55 PM      START taking these medications    Details   predniSONE 10 mg tablet Multiple Dosages:Starting Sun 5/16/2021, Last dose on Mon 5/17/2021, THEN Starting Tue 5/18/2021, Last dose on Wed 5/19/2021, THEN Starting Thu 5/20/2021, Last dose on Fri 5/21/2021, THEN Starting Sat 5/22/2021, Last dose on Sun 5/23/2021Take 4 tabl ets (40 mg total) by mouth daily with breakfast for 2 days, THEN 3 tablets (30 mg total) daily with breakfast for 2 days, THEN 2 tablets (20 mg total) daily with breakfast for 2 days, THEN 1 tablet (10 mg total) daily with breakfast for 2 days  , Normal         CONTINUE these medications which have NOT CHANGED    Details   amLODIPine (NORVASC) 10 mg tablet Take 1 tablet (10 mg total) by mouth daily, Starting Thu 12/17/2020, Normal      atorvastatin (LIPITOR) 40 mg tablet Take 1 tablet (40 mg total) by mouth every evening, Starting Thu 12/17/2020, Normal      bumetanide (BUMEX) 2 mg tablet Take 1 tablet (2 mg total) by mouth daily, Starting Tue 5/26/2020, Normal      Diclofenac Sodium (VOLTAREN) 1 % Apply 2 g topically 4 (four) times a day, Starting Fri 5/7/2021, Normal      DULoxetine (CYMBALTA) 30 mg delayed release capsule Take 1 capsule (30 mg total) by mouth daily, Starting Fri 5/7/2021, Normal      gabapentin (NEURONTIN) 100 mg capsule Take 1 capsule (100 mg total) by mouth 3 (three) times a day, Starting Wed 4/28/2021, Normal      levothyroxine 88 mcg tablet Take 1 tablet (88 mcg total) by mouth daily, Starting Thu 12/17/2020, Normal      losartan (COZAAR) 25 mg tablet Take 2 tab in AM and 1 tab in PM, Normal      omeprazole (PriLOSEC) 20 mg delayed release capsule Take 1 capsule (20 mg total) by mouth every morning, Starting Mon 2/15/2021, Normal      spironolactone (ALDACTONE) 25 mg tablet Take 0 5 tablets (12 5 mg total) by mouth daily, Starting Wed 8/5/2020, Normal      tobramycin (TOBREX) 0 3 % SOLN Administer 1 drop into the left eye 2 (two) times a day, Starting Fri 5/7/2021, Normal               PDMP Review       Value Time User    PDMP Reviewed  Yes 3/24/2021  2:21 PM Denny Lee MD          ED Provider  Electronically Signed by           Nicole Stacy PA-C  05/16/21 9453

## 2021-05-18 ENCOUNTER — TELEPHONE (OUTPATIENT)
Dept: PAIN MEDICINE | Facility: CLINIC | Age: 81
End: 2021-05-18

## 2021-05-18 ENCOUNTER — TELEPHONE (OUTPATIENT)
Dept: OBGYN CLINIC | Facility: HOSPITAL | Age: 81
End: 2021-05-18

## 2021-05-18 ENCOUNTER — TELEPHONE (OUTPATIENT)
Dept: PHYSICAL THERAPY | Facility: OTHER | Age: 81
End: 2021-05-18

## 2021-05-18 NOTE — TELEPHONE ENCOUNTER
Pt was seen back in 2017 by Dr Nina Mantle  She was seen in the ED for low back pain  She is not scheduled until 7/21 (next available) and would like to know if she can be seen sooner       Please advise  Pt had a CT scan at the ED as well    Pt's daughter in law Marie Veloz can be reached at [phone]

## 2021-05-18 NOTE — TELEPHONE ENCOUNTER
Discussed with FQ, okay to schedule in 30 min spot with NP  Per communication consent, s/w pt's daughter in law Maria Fernanda Marcano  Scheduled pt for 30 min appt with NM on 5/24, Kateryna appreciative

## 2021-05-18 NOTE — TELEPHONE ENCOUNTER
Call placed to the patient per [facility] referral     After explanation of the program the patient is refusing at this time  Patient states she has an appointment with Dr Natalee Soliman office on 5/24 and would like to see them before considering our program     Patient was provided with our ph# and hours of business  Patient was encouraged to call us back should she decide she would like a PT evaluation  Patient appreciative for the call  Referral Closed

## 2021-05-18 NOTE — TELEPHONE ENCOUNTER
Patient's DIL, Emmanuel Montiel called to schedule with Ortho  Patient is scheduled with [facility] 07-21-21 and cannot wait that long for relief  Patient has a history of treatment with Dr Daljit Bacon in 2017  Spoke with our SME, who advised she will work on getting the patient in with Dr Daljit Bacon sooner

## 2021-05-20 ENCOUNTER — TELEPHONE (OUTPATIENT)
Dept: NEPHROLOGY | Facility: CLINIC | Age: 81
End: 2021-05-20

## 2021-05-20 NOTE — TELEPHONE ENCOUNTER
I called patient to reschedule her appointment on 6/24 with Dr Lin Cantu due to Dr Lin Cantu being out of the office that time of the day  No answer  No voicemail box

## 2021-05-24 ENCOUNTER — OFFICE VISIT (OUTPATIENT)
Dept: PAIN MEDICINE | Facility: CLINIC | Age: 81
End: 2021-05-24
Payer: MEDICARE

## 2021-05-24 ENCOUNTER — TRANSCRIBE ORDERS (OUTPATIENT)
Dept: PAIN MEDICINE | Facility: CLINIC | Age: 81
End: 2021-05-24

## 2021-05-24 VITALS
BODY MASS INDEX: 31.72 KG/M2 | HEIGHT: 61 IN | HEART RATE: 90 BPM | RESPIRATION RATE: 16 BRPM | SYSTOLIC BLOOD PRESSURE: 108 MMHG | WEIGHT: 168 LBS | DIASTOLIC BLOOD PRESSURE: 60 MMHG

## 2021-05-24 DIAGNOSIS — G89.4 CHRONIC PAIN SYNDROME: ICD-10-CM

## 2021-05-24 DIAGNOSIS — M51.16 INTERVERTEBRAL DISC DISORDER WITH RADICULOPATHY OF LUMBAR REGION: ICD-10-CM

## 2021-05-24 DIAGNOSIS — G89.29 CHRONIC BILATERAL LOW BACK PAIN, UNSPECIFIED WHETHER SCIATICA PRESENT: ICD-10-CM

## 2021-05-24 DIAGNOSIS — M54.50 CHRONIC BILATERAL LOW BACK PAIN, UNSPECIFIED WHETHER SCIATICA PRESENT: ICD-10-CM

## 2021-05-24 DIAGNOSIS — M47.816 LUMBAR SPONDYLOSIS: ICD-10-CM

## 2021-05-24 DIAGNOSIS — M48.062 SPINAL STENOSIS OF LUMBAR REGION WITH NEUROGENIC CLAUDICATION: ICD-10-CM

## 2021-05-24 DIAGNOSIS — M54.16 LUMBAR RADICULOPATHY: ICD-10-CM

## 2021-05-24 PROCEDURE — 99214 OFFICE O/P EST MOD 30 MIN: CPT | Performed by: NURSE PRACTITIONER

## 2021-05-24 NOTE — PROGRESS NOTES
Assessment:  1  Chronic pain syndrome    2  Chronic bilateral low back pain, unspecified whether sciatica present    3  Lumbar spondylosis    4  Intervertebral disc disorder with radiculopathy of lumbar region    5  Spinal stenosis of lumbar region with neurogenic claudication    6  Lumbar radiculopathy        Plan:  David Reyna is a 79 y o  female who presents for a follow up office visit in regards to Back Pain  The patient has history of chronic pain syndrome secondary to low back pain, lumbar spondylosis, lumbar stenosis, lumbar spondylolisthesis  Patient presents today with increased back and left leg pain  CT scan of the lumbar spine showed anterior listhesis L4-L5 as well as spondylosis causing moderate central canal stenosis  Therefore to help decrease inflammation and nerve irritation, a left L4-L5 transforaminal epidural steroid injection can be performed  Complete risks and benefits including bleeding, infection, tissue reaction, nerve injury and allergic reaction were discussed  The approach was demonstrated using models and literature was provided  Verbal and written consent was obtained  We also advised her to take the gabapentin 100 mg more consistently up to 3 times a day if she can tolerate  Taking as needed will not help with her neuropathic pain  My impressions and treatment recommendations were discussed in detail with the patient who verbalized understanding and had no further questions  Discharge instructions were provided  I personally saw and examined the patient and I agree with the above discussed plan of care  No orders of the defined types were placed in this encounter  No orders of the defined types were placed in this encounter  History of Present Illness:  David Reyna is a 79 y o  female who presents for a follow up office visit in regards to Back Pain     The patient has history of chronic pain syndrome secondary to low back pain, lumbar spondylosis, lumbar stenosis, lumbar spondylolisthesis  Her last office visit was December 8, 2017, which was her initial consultation  At that time a right L4-L5 transforaminal epidural steroid injection was discussed with the procedure was never scheduled  She presents today with pain that is located in the low back  They also radiates into the left hip and down the anterior aspect of the left leg into the foot  It is constant occurring mostly at night  She describes it as burning, throbbing, pressure-like, shooting, numbness, and pins and needles  She is rating her pain a 10/10 on the numeric rating scale  On May 16, 2021, the patient went to the emergency room due to back pain  She had recently completed a 5 day prednisone pack which provided relief into the medication was stopped  She was given another taper pack, but this provided no relief  A CT scan of the lumbar spine was performed in the emergency room which showed anteriorlisthesis L4-L5 causing moderate central canal stenosis  There is also multilevel spondylosis  She also saw Dr Alexia Stewart on 5/14/21, who gave her a cortisone injection into the left knee, which provided no relief  She is currently taking gabapentin 100 mg at bedtime  She states she will take 100 mg during the day as needed depending upon her pain  I have personally reviewed and/or updated the patient's past medical history, past surgical history, family history, social history, current medications, allergies, and vital signs today  Review of Systems   Respiratory: Negative for shortness of breath  Cardiovascular: Negative for chest pain  Gastrointestinal: Negative for constipation, diarrhea, nausea and vomiting  Musculoskeletal: Positive for back pain, gait problem and joint swelling  Negative for arthralgias and myalgias  Skin: Negative for rash  Neurological: Negative for dizziness, seizures and weakness     All other systems reviewed and are negative  Patient Active Problem List   Diagnosis    Benign essential HTN    Chronic diastolic (congestive) heart failure (HCC)    Hypothyroidism    Acute on chronic diastolic congestive heart failure (HCC)    Arthropathy of knee    Hallux abductovalgus with bunions, unspecified laterality    CKD (chronic kidney disease), stage III ([place] [address] )    Diverticulitis of colon    Chronic gout without tophus    Hiatal hernia    Hyperlipemia    Hyperuricemia    Acute pain of left knee    Morbid obesity with body mass index of 40 0-44 9 in adult ([facility] )    Nephrolithiasis    Onychomycosis    WENDI (obstructive sleep apnea)    Umbilical hernia    Rupture of popliteal cyst    Pseudogout of left wrist    Alkaline phosphatase elevation    Diabetic polyneuropathy associated with type 2 diabetes mellitus (HCC)    Gastroesophageal reflux disease without esophagitis    Iron deficiency anemia secondary to inadequate dietary iron intake    Intraparenchymal hemorrhage of brain (HCC)    Prediabetes    Status post right frontotemporal replacement cranioplasty    BMI 38 0-38 9,adult    Acute bacterial conjunctivitis of left eye    Need for vaccination    Chest pain    AMS (altered mental status)    Hypernatremia    Sciatica of left side       Past Medical History:   Diagnosis Date    Anxiety     Arthritis     joints    Cataract     Disease of thyroid gland     Eczema     GERD (gastroesophageal reflux disease)     Gout     Heart failure ([place] [address] )     Hepatitis     Hiatal hernia     Hypertension     Onychomycosis     last assessed: 16    Orthopnea     resolved: 16    Pseudogout of left wrist     Sleep apnea     wears CPAP    Stroke [facility])        Past Surgical History:   Procedure Laterality Date    ABDOMINAL SURGERY          BRAIN HEMATOMA EVACUATION Right 2020    Procedure: Right decompressive craniectomy and evacuation of intraparenchymal hematoma;   Surgeon: Meg Taylor MD;  [facility];  Service: Neurosurgery    BREAST SURGERY Right     cyst removal    CARPAL TUNNEL RELEASE Left     CARPAL TUNNEL RELEASE Right     CATARACT EXTRACTION       SECTION  1960    x1    COLONOSCOPY N/A 2018    Procedure: COLONOSCOPY;  Surgeon: Jolynn Moore MD;  Location: [place] [address] GI LAB; Service: Gastroenterology    DILATION AND CURETTAGE OF UTERUS  1967    EYE SURGERY Left 2006    cataracts    HERNIA REPAIR      umbilical hernia    INCISIONAL HERNIA REPAIR      incarcerated    KNEE ARTHROSCOPY Right     IN REPLACE SKULL PLATE/FLAP Right 5849    Procedure: right frontotemporal replacement cranioplasty;  Surgeon: Reilly Du MD;  Location:  MAIN OR;  Service: Neurosurgery    IN XCAPSL CTRC RMVL INSJ IO LENS PROSTH W/O ECP Right 3/27/2017    Procedure: EXTRACTION EXTRACAPSULAR CATARACT PHACO INTRAOCULAR LENS (IOL); Surgeon: Anu Jefferson MD;  Location: [facility] MAIN OR;  Service: Ophthalmology       Family History   Problem Relation Age of Onset    Diabetes Mother     Coronary artery disease Mother     Arthritis Mother     Hypertension Mother     Hypertension Father     Diabetes Brother     Diabetes Son     Arthritis Family        Social History     Occupational History    Not on file   Tobacco Use    Smoking status: Never Smoker    Smokeless tobacco: Never Used   Substance and Sexual Activity    Alcohol use: Yes     Alcohol/week: 0 0 standard drinks     Frequency: Monthly or less     Drinks per session: 1 or 2     Binge frequency: Never     Comment: Rare      Drug use: Never    Sexual activity: Not Currently     Birth control/protection: Post-menopausal       Current Outpatient Medications on File Prior to Visit   Medication Sig    amLODIPine (NORVASC) 10 mg tablet Take 1 tablet (10 mg total) by mouth daily    atorvastatin (LIPITOR) 40 mg tablet Take 1 tablet (40 mg total) by mouth every evening    bumetanide (BUMEX) 2 mg tablet Take 1 tablet (2 mg total) by mouth daily    DULoxetine (CYMBALTA) 30 mg delayed release capsule Take 1 capsule (30 mg total) by mouth daily    gabapentin (NEURONTIN) 100 mg capsule Take 1 capsule (100 mg total) by mouth 3 (three) times a day    levothyroxine 88 mcg tablet Take 1 tablet (88 mcg total) by mouth daily    losartan (COZAAR) 25 mg tablet Take 2 tab in AM and 1 tab in PM    omeprazole (PriLOSEC) 20 mg delayed release capsule Take 1 capsule (20 mg total) by mouth every morning    predniSONE 10 mg tablet Take 4 tablets (40 mg total) by mouth daily with breakfast for 2 days, THEN 3 tablets (30 mg total) daily with breakfast for 2 days, THEN 2 tablets (20 mg total) daily with breakfast for 2 days, THEN 1 tablet (10 mg total) daily with breakfast for 2 days   spironolactone (ALDACTONE) 25 mg tablet Take 0 5 tablets (12 5 mg total) by mouth daily    tobramycin (TOBREX) 0 3 % SOLN Administer 1 drop into the left eye 2 (two) times a day    Diclofenac Sodium (VOLTAREN) 1 % Apply 2 g topically 4 (four) times a day (Patient not taking: Reported on 5/14/2021)     No current facility-administered medications on file prior to visit  No Known Allergies    Physical Exam:    /60   Pulse 90   Resp 16   Ht 5' 1" (1 549 m)   Wt 76 2 kg (168 lb)   BMI 31 74 kg/m²     Constitutional:normal, well developed, well nourished, alert, in no distress and non-toxic and no overt pain behavior    Eyes:anicteric  HEENT:grossly intact  Neck:supple, symmetric, trachea midline and no masses   Pulmonary:even and unlabored  Cardiovascular:No edema or pitting edema present  Skin:Normal without rashes or lesions and well hydrated  Psychiatric:Mood and affect appropriate  Neurologic:Cranial Nerves II-XII grossly intact  Musculoskeletal:ambulate with walker    Lumbar Spine Exam    Appearance:  Normal lordosis  Palpation/Tenderness:  left lumbar paraspinal tenderness  right lumbar paraspinal tenderness  Range of Motion:  Flexion:  Minimally limited  without pain  Extension:  Minimally limited  with pain  Motor Strength:  Left hip flexion:  5/5  Right hip flexion:  5/5  Left knee flexion:  5/5  Left knee extension:  5/5  Right knee flexion:  5/5  Right knee extension:  5/5  Left foot dorsiflexion:  5/5  Left foot plantar flexion:  5/5  Right foot dorsiflexion:  5/5  Right foot plantar flexion:  5/5    Imaging  Study Result    CT LUMBAR SPINE     INDICATION:   Pain      COMPARISON: CT abdomen pelvis 8/19/16     TECHNIQUE:  Contiguous axial images through the lumbar spine were obtained  Sagittal and coronal reconstructions were performed        Radiation dose length product (DLP) for this visit:  560 mGy-cm   This examination, like all CT scans performed in the [facility], was performed utilizing techniques to minimize radiation dose exposure, including the use of iterative   reconstruction and automated exposure control        IMAGE QUALITY:  Diagnostic      FINDINGS:     ALIGNMENT:  There are 5 lumbar type vertebral bodies  There is grade 1 anterolisthesis of L4 on L5 secondary to degenerative change      VERTEBRAL BODIES:  No fracture  No lytic or blastic lesion      DEGENERATIVE CHANGES:     Lower Thoracic spine:  Normal lower thoracic disc spaces  ]     L1-2:  There is disc height loss and vacuum disc phenomenon  No significant central canal or neural foraminal narrowing      L2-3:  There is disc height loss with a a broad-based disc protrusion with mild left neural foraminal and mild central canal narrowing  Mild facet joint degenerative changes      L3-4:  There is a broad-based disc protrusion causing mild central canal and mild bilateral neural foraminal narrowing  There are mild facet joint degenerative changes      L4-5:  There is disc height loss and vacuum disc phenomenon  There is moderate central canal narrowing and moderate left and mild right neural foraminal narrowing    There is facet joint hypertrophy     L5-S1:  There is disc height loss and vacuum disc phenomenon  There is a broad-based disc protrusion with mild central canal and mild bilateral neural foraminal narrowing      PARASPINAL SOFT TISSUES:   Normal      IMPRESSION:  1  No acute osseous abnormality  2   Endplate and facet joint degenerative changes scattered throughout the lumbar spine    Grade 1 anterolisthesis of L4 on L5 secondary to degenerative changes

## 2021-05-25 ENCOUNTER — TELEPHONE (OUTPATIENT)
Dept: PAIN MEDICINE | Facility: CLINIC | Age: 81
End: 2021-05-25

## 2021-05-25 NOTE — TELEPHONE ENCOUNTER
Scheduled pt for Lt L4 l5 Tfesi for 6/15/21  Pt denies rx blood thinners  Went over pre-procedure instructions below:  Nothing to eat or drink 1 hr prior to procedure  Need to arrange transportation  Proper clothing for procedure  If ill or placed on antibiotics please call to reschedule  Covid/travel/ and vaccine instructions

## 2021-05-25 NOTE — TELEPHONE ENCOUNTER
Patient's daughter-in-law Rell Pineda is requesting to schedule her epidural steroid injection  Patient is experiencing chronic lower back pain   Please advise, thx    Call back# [phone]

## 2021-05-26 DIAGNOSIS — B02.9 HERPES ZOSTER WITHOUT COMPLICATION: ICD-10-CM

## 2021-05-27 RX ORDER — GABAPENTIN 100 MG/1
CAPSULE ORAL
Qty: 90 CAPSULE | Refills: 1 | Status: SHIPPED | OUTPATIENT
Start: 2021-05-27 | End: 2021-06-08 | Stop reason: SDUPTHER

## 2021-06-01 DIAGNOSIS — E78.5 HYPERLIPIDEMIA, UNSPECIFIED HYPERLIPIDEMIA TYPE: ICD-10-CM

## 2021-06-02 RX ORDER — ATORVASTATIN CALCIUM 40 MG/1
40 TABLET, FILM COATED ORAL EVERY EVENING
Qty: 90 TABLET | Refills: 1 | Status: SHIPPED | OUTPATIENT
Start: 2021-06-02 | End: 2021-12-09 | Stop reason: SDUPTHER

## 2021-06-03 NOTE — PROGRESS NOTES
----- Message from Tonja Morley RN sent at 11/7/2019  3:08 PM CST -----  Regarding: FW: results  Hey-     I am thinking the initial call with the results may be best from you. It may get complicated due to lack of insurance. I can speak to him if it escalates.    Tonja     ----- Message -----  From: Shy Kamara MD  Sent: 11/7/2019   7:08 AM CST  To: Tonja Morley RN  Subject: results                                          Patient's results are technically negative.  The lesion was so pathognomonic for syphilis that I question this result.  I think that the patient needs to be contacted and encouraged to either follow-up with urology or the health department.  I also feel strongly that he needs to continue taking his antibiotics.  If you feel comfortable relaying this information to the patient, I would love if you would do this.  If you do not feel comfortable, I am happy to place the referral to urology and call the patient myself.  Please let me know how you would like to proceed.  ----- Message -----  From: Lab, Background User  Sent: 11/6/2019   9:27 AM CST  To: Shy Kamara MD         Cr relatively stable  WBC present in urine, but minimal amount  Will review with pt next week at appt

## 2021-06-07 ENCOUNTER — OFFICE VISIT (OUTPATIENT)
Dept: CARDIOLOGY CLINIC | Facility: CLINIC | Age: 81
End: 2021-06-07
Payer: MEDICARE

## 2021-06-07 VITALS
BODY MASS INDEX: 32.66 KG/M2 | HEIGHT: 61 IN | DIASTOLIC BLOOD PRESSURE: 62 MMHG | HEART RATE: 80 BPM | WEIGHT: 173 LBS | OXYGEN SATURATION: 98 % | TEMPERATURE: 97.3 F | SYSTOLIC BLOOD PRESSURE: 138 MMHG

## 2021-06-07 DIAGNOSIS — R06.00 EXERTIONAL DYSPNEA: ICD-10-CM

## 2021-06-07 DIAGNOSIS — R60.0 BILATERAL LEG EDEMA: ICD-10-CM

## 2021-06-07 DIAGNOSIS — I49.1 PAC (PREMATURE ATRIAL CONTRACTION): ICD-10-CM

## 2021-06-07 DIAGNOSIS — I10 BENIGN ESSENTIAL HYPERTENSION: ICD-10-CM

## 2021-06-07 DIAGNOSIS — I34.0 MODERATE TO SEVERE MITRAL REGURGITATION: ICD-10-CM

## 2021-06-07 DIAGNOSIS — I50.32 CHRONIC DIASTOLIC (CONGESTIVE) HEART FAILURE (HCC): Primary | ICD-10-CM

## 2021-06-07 DIAGNOSIS — R06.02 EXERTIONAL SHORTNESS OF BREATH: ICD-10-CM

## 2021-06-07 PROCEDURE — 99214 OFFICE O/P EST MOD 30 MIN: CPT | Performed by: INTERNAL MEDICINE

## 2021-06-07 PROCEDURE — 93000 ELECTROCARDIOGRAM COMPLETE: CPT | Performed by: INTERNAL MEDICINE

## 2021-06-07 NOTE — PROGRESS NOTES
Cardiology Follow Up  [facility]  1940  [phone]  [facility] AND [facility]  [facility]  [address] [address] [id number]    1  Chronic diastolic (congestive) heart failure (HCC)  POCT ECG   2  Moderate to severe mitral regurgitation  POCT ECG   3  Bilateral leg edema  POCT ECG   4  Exertional dyspnea  POCT ECG   5  PAC (premature atrial contraction)  POCT ECG   6  Benign essential hypertension  POCT ECG      Discussion/Plan:  Frequent PAC/irregular heart rhythm- Her heart monitor has been negative for atrial fibrillation  Heart rates have been well controlled  We will continue to monitor  Intraparenchymal bleed/cranioplasty- healed well    Moderate to severe mitral regurgitation- optimize blood pressure  Continue amlodipine 10 mg  Losartan 50 mg morning and 25mgh in evening  Bumex 2 mg daily  We discussed about tight blood pressure control  We will recheck an echocardiogram in 6 months to re-evaluate the mitral regurgitation  She will continue with a low-salt diet  Her blood pressures have been optimized  Acute on chronic diastolic heart failure with a component of lymphedema- improved volume status  Lower ext better  - Weight loss  - Compression socks + wedge pillow  - Increase bumex 2mg daily +  - Low-salt diet   - Elevation of legs + walking  -- if with continued lower extremity edema consideration for compression boots    Chronic dyspnea-I believe this is multifactorial including chronic deconditioning from severe back pain  BMI of 41  Some retained lower extremity edema  Her PFTs were reviewed which also show some restriction    She needs to started structured exercise program   Possible swimming or water sports given her severe back discomfort     Sleep apnea  -- cpap    Incomplete rbbb    Acute on chronic kidney disease follow-up with renal  - continue cozaar +  - Bumex 2mg daily    Triglycerides- controlled  - 4000 mg omega-3 daily    WENDI- on cpap      Interval History:  She denies having chest pain  Her edema is better  Taking omega 3 currently  No bleeding or bruising  No dizziness or light-headness  Blood pressure very well controlled  Has back pain  12/11/2018:  She reports having some exertional shortness of breath  Her lower extremity edema has improved but is persistent  She denies feeling dizziness or lightheadedness  She denies having any falls  She denies having any bleeding or bruising     06/12/2019:  She reports some improvement in her shortness of breath but still has dyspnea while sitting  She is unable to walk secondary to severe back pain  She denies feeling dizziness or lightheadedness  Her lower extremity edema has improved  She is trying a elevator legs  She does wear compression socks  She is compliant with CPAP     31/20: [de-identified]year-old with recent intraparenchymal hemorrhage status post right decompressive craniectomy and evacuation of intraparenchymal hematoma  She is pending follow-up bone flap by neuro surgery  She tolerated her previous procedure without evidence of decompensated heart failure  She was noted to have some bradycardia and her beta-blocker was discontinued  She is compliant with her antihypertensive medications  She is compliant with her diuretics  She denies having significant worsening in her chronic dyspnea or lower extremity edema    02/24/2021:  She denies currently feeling significant shortness of breath  She reports her lower extremity swelling is well controlled  We reviewed through her recent EKG  No prior history of atrial fibrillation  She is compliant with her medications  Denies having any falls  06/07/2021:  Her weight has been stable  She denies having major lower extremity swelling    We reviewed through her last echocardiogram   Reviewed through her Holter monitor which did not show evidence of atrial fibrillation or atrial flutter  She is compliant with her CPAP  She is compliant with her diuretics  Reviewed her last lab work  Patient Active Problem List   Diagnosis    Benign essential HTN    Chronic diastolic (congestive) heart failure (HCC)    Hypothyroidism    Acute on chronic diastolic congestive heart failure (HCC)    Arthropathy of knee    Hallux abductovalgus with bunions, unspecified laterality    CKD (chronic kidney disease), stage III ([place] [address] )    Diverticulitis of colon    Chronic gout without tophus    Hiatal hernia    Hyperlipemia    Hyperuricemia    Acute pain of left knee    Morbid obesity with body mass index of 40 0-44 9 in adult ([facility] )    Nephrolithiasis    Onychomycosis    WENDI (obstructive sleep apnea)    Umbilical hernia    Rupture of popliteal cyst    Pseudogout of left wrist    Alkaline phosphatase elevation    Diabetic polyneuropathy associated with type 2 diabetes mellitus (HCC)    Gastroesophageal reflux disease without esophagitis    Iron deficiency anemia secondary to inadequate dietary iron intake    Intraparenchymal hemorrhage of brain (HCC)    Prediabetes    Status post right frontotemporal replacement cranioplasty    BMI 38 0-38 9,adult    Acute bacterial conjunctivitis of left eye    Need for vaccination    Chest pain    AMS (altered mental status)    Hypernatremia    Sciatica of left side     Past Medical History:   Diagnosis Date    Anxiety     Arthritis     joints    Cataract     Disease of thyroid gland     Eczema     GERD (gastroesophageal reflux disease)     Gout     Heart failure ([place] [address] )     Hepatitis     Hiatal hernia     Hypertension     Onychomycosis     last assessed: 4/29/16    Orthopnea     resolved: 1/8/16    Pseudogout of left wrist     Sleep apnea     wears CPAP    Stroke [facility])      Social History     Socioeconomic History    Marital status:       Spouse name: Not on file    Number of children: Not on file  Years of education: Not on file    Highest education level: Not on file   Occupational History    Not on file   Social Needs    Financial resource strain: Not on file    Food insecurity     Worry: Not on file     Inability: Not on file    Transportation needs     Medical: No     Non-medical: No   Tobacco Use    Smoking status: Never Smoker    Smokeless tobacco: Never Used   Substance and Sexual Activity    Alcohol use: Yes     Alcohol/week: 0 0 standard drinks     Frequency: Monthly or less     Drinks per session: 1 or 2     Binge frequency: Never     Comment: Rare   Drug use: Never    Sexual activity: Not Currently     Birth control/protection: Post-menopausal   Lifestyle    Physical activity     Days per week: Not on file     Minutes per session: Not on file    Stress: Not on file   Relationships    Social connections     Talks on phone: Not on file     Gets together: Not on file     Attends Orthodox service: Not on file     Active member of club or organization: Not on file     Attends meetings of clubs or organizations: Not on file     Relationship status: Not on file    Intimate partner violence     Fear of current or ex partner: Not on file     Emotionally abused: Not on file     Physically abused: Not on file     Forced sexual activity: Not on file   Other Topics Concern    Not on file   Social History Narrative    Daily coffee consumption    Lack of exercise    Sleeps 6-7 hours a day      Family History   Problem Relation Age of Onset    Diabetes Mother     Coronary artery disease Mother     Arthritis Mother     Hypertension Mother     Hypertension Father     Diabetes Brother     Diabetes Son     Arthritis Family      Past Surgical History:   Procedure Laterality Date    ABDOMINAL SURGERY          BRAIN HEMATOMA EVACUATION Right 2020    Procedure: Right decompressive craniectomy and evacuation of intraparenchymal hematoma;   Surgeon: Ina Whaley MD; [facility];  Service: Neurosurgery    BREAST SURGERY Right     cyst removal    CARPAL TUNNEL RELEASE Left     CARPAL TUNNEL RELEASE Right     CATARACT EXTRACTION       SECTION  1960    x1    COLONOSCOPY N/A 2018    Procedure: COLONOSCOPY;  Surgeon: Sridhar Bae MD;  Location: [facility] GI LAB; Service: Gastroenterology    DILATION AND CURETTAGE OF UTERUS  1967    EYE SURGERY Left 2006    cataracts    HERNIA REPAIR      umbilical hernia    INCISIONAL HERNIA REPAIR      incarcerated    KNEE ARTHROSCOPY Right     UT REPLACE SKULL PLATE/FLAP Right 1199    Procedure: right frontotemporal replacement cranioplasty;  Surgeon: Bridgette Quiroga MD;  Location:  MAIN OR;  Service: Neurosurgery    UT XCAPSL CTRC RMVL INSJ IO LENS PROSTH W/O ECP Right 3/27/2017    Procedure: EXTRACTION EXTRACAPSULAR CATARACT PHACO INTRAOCULAR LENS (IOL);   Surgeon: Janell Hinds MD;  Location: [facility] MAIN OR;  Service: Ophthalmology       Current Outpatient Medications:     amLODIPine (NORVASC) 10 mg tablet, Take 1 tablet (10 mg total) by mouth daily, Disp: 90 tablet, Rfl: 1    atorvastatin (LIPITOR) 40 mg tablet, TAKE 1 TABLET (40 MG TOTAL) BY MOUTH EVERY EVENING, Disp: 90 tablet, Rfl: 1    bumetanide (BUMEX) 2 mg tablet, Take 1 tablet (2 mg total) by mouth daily, Disp: 135 tablet, Rfl: 3    DULoxetine (CYMBALTA) 30 mg delayed release capsule, Take 1 capsule (30 mg total) by mouth daily, Disp: 30 capsule, Rfl: 5    gabapentin (NEURONTIN) 100 mg capsule, TAKE 1 CAPSULE(100 MG) BY MOUTH THREE TIMES DAILY, Disp: 90 capsule, Rfl: 1    levothyroxine 88 mcg tablet, Take 1 tablet (88 mcg total) by mouth daily, Disp: 90 tablet, Rfl: 1    losartan (COZAAR) 25 mg tablet, Take 2 tab in AM and 1 tab in PM, Disp: 180 tablet, Rfl: 3    omeprazole (PriLOSEC) 20 mg delayed release capsule, Take 1 capsule (20 mg total) by mouth every morning, Disp: 90 capsule, Rfl: 1    spironolactone (ALDACTONE) 25 mg tablet, Take 0 5 tablets (12 5 mg total) by mouth daily, Disp: 45 tablet, Rfl: 3    Diclofenac Sodium (VOLTAREN) 1 %, Apply 2 g topically 4 (four) times a day (Patient not taking: Reported on 5/14/2021), Disp: 100 g, Rfl: 1    tobramycin (TOBREX) 0 3 % SOLN, Administer 1 drop into the left eye 2 (two) times a day (Patient not taking: Reported on 6/7/2021), Disp: 5 mL, Rfl: 0  No Known Allergies    Review of Systems:  Review of Systems   Constitutional: Negative  Negative for activity change, appetite change, chills, diaphoresis, fatigue, fever and unexpected weight change  HENT: Negative  Negative for congestion, dental problem, drooling, ear discharge, ear pain, facial swelling, hearing loss, mouth sores, nosebleeds, postnasal drip, rhinorrhea, sinus pressure, sinus pain, sneezing, sore throat, tinnitus, trouble swallowing and voice change  Eyes: Negative  Negative for photophobia, pain, redness, itching and visual disturbance  Respiratory: Negative  Negative for apnea, cough, choking, chest tightness, shortness of breath, wheezing and stridor  Cardiovascular: Positive for leg swelling  Negative for chest pain and palpitations  Gastrointestinal: Negative  Negative for abdominal distention, abdominal pain, anal bleeding, blood in stool, constipation, diarrhea, nausea, rectal pain and vomiting  Endocrine: Negative  Negative for cold intolerance, heat intolerance, polydipsia, polyphagia and polyuria  Genitourinary: Negative  Negative for decreased urine volume, difficulty urinating, dyspareunia, dysuria, enuresis, flank pain, frequency, genital sores, hematuria, menstrual problem, pelvic pain, urgency, vaginal bleeding, vaginal discharge and vaginal pain  Musculoskeletal: Positive for back pain  Negative for arthralgias, gait problem, joint swelling, myalgias, neck pain and neck stiffness  Skin: Negative  Negative for color change, pallor, rash and wound  Allergic/Immunologic: Negative  Negative for environmental allergies, food allergies and immunocompromised state  Neurological: Negative  Negative for dizziness, tremors, seizures, syncope, facial asymmetry, speech difficulty, weakness, light-headedness, numbness and headaches  Hematological: Negative  Negative for adenopathy  Does not bruise/bleed easily  Psychiatric/Behavioral: Negative  Negative for agitation, behavioral problems, confusion, decreased concentration, dysphoric mood, hallucinations, self-injury, sleep disturbance and suicidal ideas  The patient is not nervous/anxious and is not hyperactive  All other systems reviewed and are negative  Vitals:    06/07/21 1054   BP: 138/62   BP Location: Right arm   Patient Position: Sitting   Cuff Size: Large   Pulse: 80   Temp: (!) 97 3 °F (36 3 °C)   SpO2: 98%   Weight: 78 5 kg (173 lb)   Height: 5' 1" (1 549 m)     Physical Exam:  Physical Exam   Constitutional: She is oriented to person, place, and time  She appears well-developed and well-nourished  No distress  HENT:   Head: Normocephalic and atraumatic  Right Ear: External ear normal    Left Ear: External ear normal    Eyes: Pupils are equal, round, and reactive to light  Conjunctivae are normal  Right eye exhibits no discharge  Left eye exhibits no discharge  No scleral icterus  Neck: Normal range of motion  Neck supple  No JVD present  No tracheal deviation present  No thyromegaly present  Cardiovascular: Normal rate and regular rhythm  Exam reveals gallop  Exam reveals no friction rub  No murmur heard  Pulmonary/Chest: Effort normal and breath sounds normal  No stridor  No respiratory distress  She has no wheezes  She has no rales  She exhibits no tenderness  Abdominal: Soft  Bowel sounds are normal  She exhibits no distension and no mass  There is no abdominal tenderness  There is no rebound and no guarding  Musculoskeletal: Normal range of motion  General: Edema present   No tenderness or deformity  Neurological: She is alert and oriented to person, place, and time  She has normal reflexes  No cranial nerve deficit  She exhibits normal muscle tone  Coordination normal    Skin: Skin is warm and dry  No rash noted  She is not diaphoretic  No erythema  No pallor  Psychiatric: She has a normal mood and affect  Her behavior is normal  Judgment and thought content normal    Nursing note and vitals reviewed  Labs:     Lab Results   Component Value Date    WBC 5 78 03/25/2021    HGB 11 4 (L) 03/25/2021    HCT 36 0 03/25/2021    MCV 88 03/25/2021     03/25/2021     Lab Results   Component Value Date    K 3 8 03/25/2021     03/25/2021    CO2 28 03/25/2021    BUN 34 (H) 03/25/2021    CREATININE 1 13 03/25/2021    GLUCOSE 124 02/11/2020    GLUF 92 03/25/2021    CALCIUM 9 5 03/25/2021    AST 15 03/24/2021    ALT 22 03/24/2021    ALKPHOS 128 (H) 03/24/2021    EGFR 46 03/25/2021     Lab Results   Component Value Date    CHOL 191 12/27/2013    CHOL 218 09/21/2013     Lab Results   Component Value Date    HDL 54 12/08/2020    HDL 50 06/23/2020    HDL 45 02/11/2020     Lab Results   Component Value Date    LDLCALC 45 12/08/2020    LDLCALC 48 06/23/2020    LDLCALC 103 (H) 02/11/2020     Lab Results   Component Value Date    TRIG 79 12/08/2020    TRIG 114 06/23/2020    TRIG 146 02/11/2020     Lab Results   Component Value Date    HGBA1C 5 7 (H) 12/08/2020       Imaging & Testing   I have personally reviewed pertinent reports  EKG: Personally reviewed      Normal sinus rhythm no acute st/t wave    Cardiac testing:   Results for orders placed during the hospital encounter of 01/15/16   Echo complete with contrast if indicated    Narrative [address]  [address] [facility]  [address] [phone]    Transthoracic Echocardiogram  2D, M-mode, Doppler, and Color Doppler    Study date:  15-Berny-2016    Patient: Brandon Akers  MR number: [id number]  Account number: [id number]  : 1940  Age: 67 years  Gender: Female  Status: Routine  Location: Echo lab  Height: 61 in  Weight: 214 5 lb  BP: 132/ 84 mmHg    Indications: HTN    Diagnoses: 401 9 - HYPERTENSION NOS    Sonographer:  Edgardo Moreno  Primary Physician:  Reddy Webber  Referring Physician:  Annalisa Quiroga:  Colon Dory  Interpreting Physician:  DO AFIA Foster    LEFT VENTRICLE:  Systolic function was at the lower limits of normal  Ejection fraction was  estimated in the range of 50 % to 55 %  There were no regional wall motion abnormalities  There was moderate concentric hypertrophy  Features were consistent with a pseudonormal left ventricular filling pattern,  with concomitant abnormal relaxation and increased filling pressure (grade 2  diastolic dysfunction)  Doppler parameters were consistent with elevated mean  left atrial filling pressure  LEFT ATRIUM:  The atrium was moderately dilated  RIGHT ATRIUM:  The atrium was markedly dilated  MITRAL VALVE:  There was marked annular calcification  There was moderate regurgitation  TRICUSPID VALVE:  There was mild regurgitation  Pulmonary artery systolic pressure was mildly increased  Estimated peak PA pressure was 46 mmHg  HISTORY: PRIOR HISTORY: Patient has no history of cardiovascular disease  PROCEDURE: The procedure was performed in the echo lab  This was a routine  study  The transthoracic approach was used  The study included complete 2D  imaging, M-mode, complete spectral Doppler, and color Doppler  The heart rate  was 77 bpm, at the start of the study  Echocardiographic views were limited due  to poor acoustic window availability and decreased penetration  This was a  technically difficult study  LEFT VENTRICLE: Size was normal  Systolic function was at the lower limits of  normal  Ejection fraction was estimated in the range of 50 % to 55 %  There  were no regional wall motion abnormalities   There was moderate concentric  hypertrophy  DOPPLER: Features were consistent with a pseudonormal left  ventricular filling pattern, with concomitant abnormal relaxation and increased  filling pressure (grade 2 diastolic dysfunction)  Doppler parameters were  consistent with elevated mean left atrial filling pressure  RIGHT VENTRICLE: The size was normal  Systolic function was normal  DOPPLER:  Systolic pressure was within the normal range  LEFT ATRIUM: The atrium was moderately dilated  No thrombus was identified  RIGHT ATRIUM: The atrium was markedly dilated  MITRAL VALVE: There was marked annular calcification  Valve structure was  normal  There was normal leaflet separation  No echocardiographic evidence for  prolapse  DOPPLER: The transmitral velocity was within the normal range  There  was no evidence for stenosis  There was moderate regurgitation  AORTIC VALVE: The valve was trileaflet  Leaflets exhibited normal thickness,  normal cuspal separation, and sclerosis  DOPPLER: Transaortic velocity was  within the normal range  There was no evidence for stenosis  There was no  regurgitation  TRICUSPID VALVE: The valve structure was normal  There was normal leaflet  separation  DOPPLER: The transtricuspid velocity was within the normal range  There was mild regurgitation  Pulmonary artery systolic pressure was mildly  increased  Estimated peak PA pressure was 46 mmHg  PULMONIC VALVE: Leaflets exhibited normal thickness, no calcification, and  normal cuspal separation  DOPPLER: The transpulmonic velocity was within the  normal range  There was mild regurgitation  PERICARDIUM: There was no thickening  There was no pericardial effusion  AORTA: The root exhibited normal size      PULMONARY ARTERY: The size was normal  The morphology appeared normal     SYSTEM MEASUREMENT TABLES    2D mode  AoR Diam 2D: 2 8 cm  LA Diam (2D): 5 3 cm  LA/Ao (2D): 1 89  FS (2D Teich): 25 3 %  IVSd (2D): 1 44 cm  LVDEV: 98 3 cm³  LVESV: 49 1 cm³  LVIDd(2D): 4 62 cm  LVISd (2D): 3 45 cm  LVPWd (2D): 1 3 cm  SV (Teich): 49 2 cm³    Apical four chamber  LVEF A4C: 55 %    Unspecified Scan Mode  MV Peak A Jesse: 1110 mm/s  MV Peak E Jesse  Mean: 1400 mm/s  MVA (PHT): 3 55 cm squared  PHT: 58 ms  Max P mm[Hg]  V Max: 2990 mm/s  Vmax: 3050 mm/s  RA Area: 19 6 cm squared  RA Volume: 55 3 cm³  TAPSE: 1 9 cm    [facility]    Prepared and electronically signed by    Marjorietanisha Owens DO  Signed 2016 17:37:47       EKG sinus rhythm with frequent apc qtc [address] MD Alaniz  Please call with any questions or suggestions    A description of the counseling:   Goals and Barriers:  Patient's ability to self care:  Medication side effect reviewed with patient in detail and all their questions answered  "This note has been constructed using a voice recognition system  Therefore there may be syntax, spelling, and/or grammatical errors   Please call if you have any questions  "

## 2021-06-08 DIAGNOSIS — R45.89 DEPRESSED MOOD: ICD-10-CM

## 2021-06-08 DIAGNOSIS — B02.9 HERPES ZOSTER WITHOUT COMPLICATION: ICD-10-CM

## 2021-06-08 NOTE — TELEPHONE ENCOUNTER
I left message for patient to call the office to reschedule her appointment with Dr Yayo Hduson on June 24 due to him being out of the office that day

## 2021-06-09 RX ORDER — DULOXETIN HYDROCHLORIDE 30 MG/1
30 CAPSULE, DELAYED RELEASE ORAL DAILY
Qty: 30 CAPSULE | Refills: 0 | Status: SHIPPED | OUTPATIENT
Start: 2021-06-09 | End: 2021-07-01

## 2021-06-09 RX ORDER — GABAPENTIN 100 MG/1
100 CAPSULE ORAL 3 TIMES DAILY
Qty: 90 CAPSULE | Refills: 0 | Status: SHIPPED | OUTPATIENT
Start: 2021-06-09 | End: 2021-07-01 | Stop reason: SDUPTHER

## 2021-06-15 ENCOUNTER — HOSPITAL ENCOUNTER (OUTPATIENT)
Dept: RADIOLOGY | Facility: CLINIC | Age: 81
Discharge: HOME | End: 2021-06-15
Attending: ANESTHESIOLOGY | Admitting: ANESTHESIOLOGY
Payer: MEDICARE

## 2021-06-15 VITALS
HEART RATE: 79 BPM | RESPIRATION RATE: 20 BRPM | DIASTOLIC BLOOD PRESSURE: 71 MMHG | SYSTOLIC BLOOD PRESSURE: 128 MMHG | OXYGEN SATURATION: 98 % | TEMPERATURE: 96 F

## 2021-06-15 DIAGNOSIS — M54.16 LUMBAR RADICULOPATHY: ICD-10-CM

## 2021-06-15 DIAGNOSIS — M48.062 SPINAL STENOSIS OF LUMBAR REGION WITH NEUROGENIC CLAUDICATION: ICD-10-CM

## 2021-06-15 PROCEDURE — 64483 NJX AA&/STRD TFRM EPI L/S 1: CPT | Performed by: ANESTHESIOLOGY

## 2021-06-15 PROCEDURE — 64484 NJX AA&/STRD TFRM EPI L/S EA: CPT | Performed by: ANESTHESIOLOGY

## 2021-06-15 RX ORDER — BUPIVACAINE HCL/PF 2.5 MG/ML
10 VIAL (ML) INJECTION ONCE
Status: COMPLETED | OUTPATIENT
Start: 2021-06-15 | End: 2021-06-15

## 2021-06-15 RX ORDER — METHYLPREDNISOLONE ACETATE 80 MG/ML
80 INJECTION, SUSPENSION INTRA-ARTICULAR; INTRALESIONAL; INTRAMUSCULAR; PARENTERAL; SOFT TISSUE ONCE
Status: COMPLETED | OUTPATIENT
Start: 2021-06-15 | End: 2021-06-15

## 2021-06-15 RX ORDER — 0.9 % SODIUM CHLORIDE 0.9 %
10 VIAL (ML) INJECTION ONCE
Status: COMPLETED | OUTPATIENT
Start: 2021-06-15 | End: 2021-06-15

## 2021-06-15 RX ADMIN — BUPIVACAINE HYDROCHLORIDE 2 ML: 2.5 INJECTION, SOLUTION EPIDURAL; INFILTRATION; INTRACAUDAL at 09:39

## 2021-06-15 RX ADMIN — Medication 4 ML: at 09:36

## 2021-06-15 RX ADMIN — SODIUM CHLORIDE 4 ML: 9 INJECTION, SOLUTION INTRAMUSCULAR; INTRAVENOUS; SUBCUTANEOUS at 09:36

## 2021-06-15 RX ADMIN — METHYLPREDNISOLONE ACETATE 80 MG: 80 INJECTION, SUSPENSION INTRA-ARTICULAR; INTRALESIONAL; INTRAMUSCULAR; PARENTERAL; SOFT TISSUE at 09:39

## 2021-06-15 RX ADMIN — IOHEXOL 1 ML: 300 INJECTION, SOLUTION INTRAVENOUS at 09:39

## 2021-06-15 NOTE — H&P
History of Present Illness: The patient is a 79 y o  female who presents with complaints of left low back pain into leg secondary to lumbar disc bulging is here today for left L4-5 transforaminal epidural steroid injection      Patient Active Problem List   Diagnosis    Benign essential HTN    Chronic diastolic (congestive) heart failure (HCC)    Hypothyroidism    Acute on chronic diastolic congestive heart failure (HCC)    Arthropathy of knee    Hallux abductovalgus with bunions, unspecified laterality    CKD (chronic kidney disease), stage III ([facility] )    Diverticulitis of colon    Chronic gout without tophus    Hiatal hernia    Hyperlipemia    Hyperuricemia    Acute pain of left knee    Morbid obesity with body mass index of 40 0-44 9 in adult ([facility] )    Nephrolithiasis    Onychomycosis    WENDI (obstructive sleep apnea)    Umbilical hernia    Rupture of popliteal cyst    Pseudogout of left wrist    Alkaline phosphatase elevation    Diabetic polyneuropathy associated with type 2 diabetes mellitus (HCC)    Gastroesophageal reflux disease without esophagitis    Iron deficiency anemia secondary to inadequate dietary iron intake    Exertional shortness of breath    [facility]raparenchymal hemorrhage [facility] brain ([facility])    Prediabetes    Status post right frontotemporal replacement cranioplasty    BMI 38 0-38 9,adult    Acute bacterial conjunctivitis of left eye    Need for vaccination    Chest pain    AMS (altered mental status)    Hypernatremia    Sciatica of left side       Past Medical History:   Diagnosis Date    Anxiety     Arthritis     joints    Cataract     Disease of thyroid gland     Eczema     GERD (gastroesophageal reflux disease)     Gout     Heart failure ([facility] )     Hepatitis     Hiatal hernia     Hypertension     Onychomycosis     last assessed: 4/29/16    Orthopnea     resolved: 1/8/16    Pseudogout of left wrist     Sleep apnea     wears CPAP    Stroke [facility])        Past Surgical History:   Procedure Laterality Date    ABDOMINAL SURGERY          BRAIN HEMATOMA EVACUATION Right 2020    Procedure: Right decompressive craniectomy and evacuation of intraparenchymal hematoma; Surgeon: Rhina Flanagan MD;  [facility];  Service: Neurosurgery    BREAST SURGERY Right     cyst removal    CARPAL TUNNEL RELEASE Left     CARPAL TUNNEL RELEASE Right     CATARACT EXTRACTION       SECTION  1960    x1    COLONOSCOPY N/A 2018    Procedure: COLONOSCOPY;  Surgeon: Graig Spurling, MD;  Location: [place] [address] GI LAB; Service: Gastroenterology    DILATION AND CURETTAGE OF UTERUS  1967    EYE SURGERY Left 2006    cataracts    HERNIA REPAIR      umbilical hernia    INCISIONAL HERNIA REPAIR      incarcerated    KNEE ARTHROSCOPY Right     UT REPLACE SKULL PLATE/FLAP Right 8527    Procedure: right frontotemporal replacement cranioplasty;  Surgeon: Rhina Flanagan MD;  [facility]: [facility];  Service: Neurosurgery    UT XCAPSL CTRC RMVL INSJ IO LENS PROSTH W/O ECP Right 3/27/2017    Procedure: EXTRACTION EXTRACAPSULAR CATARACT PHACO INTRAOCULAR LENS (IOL);   Surgeon: Faheem Butcher MD;  Location: [facility] MAIN OR;  Service: Ophthalmology         Current Outpatient Medications:     amLODIPine (NORVASC) 10 mg tablet, Take 1 tablet (10 mg total) by mouth daily, Disp: 90 tablet, Rfl: 1    atorvastatin (LIPITOR) 40 mg tablet, TAKE 1 TABLET (40 MG TOTAL) BY MOUTH EVERY EVENING, Disp: 90 tablet, Rfl: 1    bumetanide (BUMEX) 2 mg tablet, Take 1 tablet (2 mg total) by mouth daily, Disp: 135 tablet, Rfl: 3    Diclofenac Sodium (VOLTAREN) 1 %, Apply 2 g topically 4 (four) times a day (Patient not taking: Reported on 2021), Disp: 100 g, Rfl: 1    DULoxetine (CYMBALTA) 30 mg delayed release capsule, Take 1 capsule (30 mg total) by mouth daily, Disp: 30 capsule, Rfl: 0    gabapentin (NEURONTIN) 100 mg capsule, Take 1 capsule (100 mg total) by mouth 3 (three) times a day, Disp: 90 capsule, Rfl: 0    levothyroxine 88 mcg tablet, Take 1 tablet (88 mcg total) by mouth daily, Disp: 90 tablet, Rfl: 1    losartan (COZAAR) 25 mg tablet, Take 2 tab in AM and 1 tab in PM, Disp: 180 tablet, Rfl: 3    omeprazole (PriLOSEC) 20 mg delayed release capsule, Take 1 capsule (20 mg total) by mouth every morning, Disp: 90 capsule, Rfl: 1    spironolactone (ALDACTONE) 25 mg tablet, Take 0 5 tablets (12 5 mg total) by mouth daily, Disp: 45 tablet, Rfl: 3    tobramycin (TOBREX) 0 3 % SOLN, Administer 1 drop into the left eye 2 (two) times a day (Patient not taking: Reported on 6/7/2021), Disp: 5 mL, Rfl: 0    Current Facility-Administered Medications:     bupivacaine (PF) (MARCAINE) 0 25 % injection 10 mL, 10 mL, Epidural, Once, Jose Richardson MD    iohexol (OMNIPAQUE) 300 mg/mL injection 50 mL, 50 mL, Epidural, Once, Jose Richardson MD    lidocaine (PF) (XYLOCAINE-MPF) 2 % injection 5 mL, 5 mL, Infiltration, Once, Jose Richardson MD    methylPREDNISolone acetate (DEPO-MEDROL) injection 80 mg, 80 mg, Epidural, Once, Jose Richardson MD    sodium chloride (PF) 0 9 % injection 10 mL, 10 mL, Infiltration, Once, Jose Richardson MD    No Known Allergies    Physical Exam:   Vitals:    06/15/21 0920   BP: 127/69   Pulse: 71   Resp: 20   Temp: (!) 96 °F (35 6 °C)   SpO2: 98%     General: Awake, Alert, Oriented x 3, Mood and affect appropriate  Respiratory: Respirations even and unlabored  Cardiovascular: Peripheral pulses intact; no edema  Musculoskeletal Exam:  Left lower back tenderness    ASA Score: 3    Patient/Chart Verification  Patient ID Verified: Verbal  Consents Confirmed: To be obtained in the Pre-Procedure area  H&P( within 30 days) Verified: To be obtained in the Pre-Procedure area  Interval H&P(within 24 hr) Complete (required for Outpatients and Surgery Admit only): To be obtained in the Pre-Procedure area  Allergies Reviewed:  Yes  Anticoag/NSAID held?: NA  Currently on antibiotics?: No    Assessment:   1  Spinal stenosis of lumbar region with neurogenic claudication    2   Lumbar radiculopathy        Plan: left L4-L5 TFESI

## 2021-06-15 NOTE — DISCHARGE INSTR - LAB
Epidural Steroid Injection   WHAT YOU NEED TO KNOW:   An epidural steroid injection (BRET) is a procedure to inject steroid medicine into the epidural space  The epidural space is between your spinal cord and vertebrae  Steroids reduce inflammation and fluid buildup in your spine that may be causing pain  You may be given pain medicine along with the steroids  ACTIVITY  · Do not drive or operate machinery today  · No strenuous activity today - bending, lifting, etc   · You may resume normal activites starting tomorrow - start slowly and as tolerated  · You may shower today, but no tub baths or hot tubs  · You may have numbness for several hours from the local anesthetic  Please use caution and common sense, especially with weight-bearing activities  CARE OF THE INJECTION SITE  · If you have soreness or pain, apply ice to the area today (20 minutes on/20 minutes off)  · Starting tomorrow, you may use warm, moist heat or ice if needed  · You may have an increase or change in your discomfort for 36-48 hours after your treatment  · Apply ice and continue with any pain medication you have been prescribed  · Notify [facility] if you have any of the following: redness, drainage, swelling, headache, stiff neck or fever above 100°F     SPECIAL INSTRUCTIONS  · Our office will contact you in approximately 7 days for a progress report  MEDICATIONS  · Continue to take all routine medications  · Our office may have instructed you to hold some medications  As no general anesthesia was used in today's procedure, you should not experience any side effects related to anesthesia  If you have a problem specifically related to your procedure, please call our office at [phone]  Problems not related to your procedure should be directed to your primary care physician

## 2021-06-20 NOTE — MISCELLANEOUS
Message   Recorded as Task   Date: 06/16/2017 01:17 PM, Created By: Sukhi Garcia   Task Name: Follow Up   Assigned To: Penny Ludwig   Regarding Patient: Pavithra Rubi, Status: Active   Comment:    Ele Miller - 16 Jun 2017 1:17 PM     TASK CREATED  Hello    can patient be notified that labwork is relatively stable  renal function is improving  There is a renal stone which not in an unsafe area currently, but she should still see Urology non urgently  Can patient have following:    - Urology referral non urgently for nephrolothiasis to assist in co managing nephrolithiasis  - Urine Eosinophils, CBC, BMP, UA in 4-6 weeks  Thank you    elly   pt advised of above  Will contact Dr Brook Green for appointment/lr      Active Problems    1  Abdominal pain (789 00) (R10 9)   2  Acute bronchitis (466 0) (J20 9)   3  Acute on chronic diastolic congestive heart failure (428 33,428 0) (I50 33)   4  Ankle pain, unspecified laterality   5  Arthropathy of knee (716 96) (M17 10)   6  Atherosclerosis of arteries of extremities (440 20) (I70 209)   7  Benign essential hypertension (401 1) (I10)   8  Breast pain (611 71) (N64 4)   9  Bunion, unspecified laterality   10  Callus (700) (L84)   11  Cellulitis (682 9) (L03 90)   12  Chronic reflux esophagitis (530 11) (K21 0)   13  Chronic venous insufficiency (459 81) (I87 2)   14  CKD (chronic kidney disease), stage III (585 3) (N18 3)   15  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)   16  Dehydration (276 51) (E86 0)   17  Dermatitis (692 9) (L30 9)   18  Difficulty in walking (719 7) (R26 2)   19  Discoloration of nail (703 8) (L60 8)   20  Diverticulitis of colon (562 11) (K57 32)   21  Dizziness (780 4) (R42)   22  Dyspnea on exertion (786 09) (R06 09)   23  Dystrophic nail (703 8) (L60 3)   24  Dysuria (788 1) (R30 0)   25  Edema (782 3) (R60 9)   26  Electrolyte or fluid disorder (276 9) (A65 8)   27   Elevated triglycerides with high cholesterol (272 2) (E78 2)   28  Encounter for screening for cardiovascular disorders (V81 2) (Z13 6)   29  Encounter for screening mammogram for breast cancer (V76 12) (Z12 31)   30  Esophageal reflux (530 81) (K21 9)   31  Flu vaccine need (V04 81) (Z23)   32  Flu vaccine need (V04 81) (Z23)   33  Gout (274 9) (M10 9)   34  Headache (784 0) (R51)   35  Hiatal hernia (553 3) (K44 9)   36  Hypertension (401 9) (I10)   37  Hypothyroidism (244 9) (E03 9)   38  Infectious diarrhea (009 2) (A09)   39  Knee pain (719 46) (M25 569)   40  Leg swelling (729 81) (M79 89)   41  Lumbar canal stenosis (724 02) (M48 06)   42  Lumbar radiculopathy (724 4) (M54 16)   43  Lymphedema (457 1) (I89 0)   44  Medicare annual wellness visit, initial (V70 0) (Z00 00)   45  Morbid obesity with body mass index of 40 0-44 9 in adult (278 01,V85 41)    (E66 01,Z68 41)   46  Nails Onychauxis (703 8)   47  Nausea (787 02) (R11 0)   48  Need for prophylactic vaccination and inoculation against influenza (V04 81) (Z23)   49  Nephrolithiasis (592 0) (N20 0)   50  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)   51  Obstructive sleep apnea (327 23) (G47 33)   52  Onychomycosis (110 1) (B35 1)   53  Orthopnea (786 02) (R06 01)   54  Osteoarthritis, localized, knee (715 36) (M17 10)   55  Other specified anxiety disorders (300 09) (F41 8)   56  Other specified inflammatory spondylopathies, 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) (M46 80)   57  Overweight (278 02) (E66 3)   58  Pain in both feet (729 5) (M79 671,M79 672)   59  Palpitations (785 1) (R00 2)   60  Pes planus, unspecified laterality (734) (M21 40)   61  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)   62  Renal disorder (593 9) (N28 9)   63  Rupture of popliteal cyst (727 51) (M66 0)   64  Screening for colon cancer (V76 51) (Z12 11)   65  Screening for diabetes mellitus (DM) (V77 1) (Z13 1)   66  Screening for genitourinary condition (V81 6) (Z13 89)   67   Screening for malignant neoplasm of breast (V76 10) (Z12 39)   68  Short unilateral neuralgiform headache, conjunctival injection/tearing (339 05) (G44 059)   69  Somnolence, daytime (780 54) (R40 0)   70  Tarsal tunnel syndrome (355 5) (G57 50)   71  Tenderness in limb (729 5) (M79 609)   72  Tinea pedis (110 4) (B35 3)   73  Umbilical hernia (892 1) (K42 9)   74  Visit for screening mammogram (V76 12) (Z12 31)    Current Meds   1  Amlodipine-Olmesartan 10-40 MG Oral Tablet; TAKE 1 TABLET ONCE DAILY; Therapy: 98USH3130 to (Jon Brock)  Requested for: 92AKJ2169; Last   Rx:02Jun2017 Ordered   2  Aspirin 81 MG Oral Tablet Chewable; Therapy: (Recorded:82Zcj2557) to Recorded   3  Bumetanide 2 MG Oral Tablet; take 1 tablet every other day; Therapy: 62EHW1794 to (Evaluate:11Nov2017)  Requested for: 93XDH0280; Last   Rx:15May2017 Ordered   4  Gabapentin 300 MG Oral Capsule; TAKE 1 CAPSULE AT BEDTIME; Therapy: 91JEB0042 to (Evaluate:31Rdw7602)  Requested for: 59REE4040; Last   Rx:15Jun2017 Ordered   5  Levothyroxine Sodium 125 MCG Oral Tablet; TAKE 1 TABLET ONE TIME DAILY; Therapy: 06EBM7167 to (Sang Morgan)  Requested for: 12Jun2017; Last   Rx:12Jun2017 Ordered   6  Omega-3-acid Ethyl Esters 1 GM Oral Capsule; take 2 capsules by mouth twice a day; Therapy: 22FVM4979 to (Jon Brock)  Requested for: 68TTD8688; Last   Rx:02Jun2017 Ordered   7  Omeprazole 20 MG Oral Capsule Delayed Release; TAKE 1 CAPSULE EVERY DAY AS   DIRECTED  BY  PHYSICIAN; Therapy: 07GPY6498 to (Monse No)  Requested for: 35PAQ4847; Last   Rx:19May2017 Ordered   8  Propranolol HCl - 80 MG Oral Tablet; TAKE 1 TABLET EVERY 12 HOURS DAILY; Therapy: 11Aug2015 to (Last Rx:37Eza3254)  Requested for: 72PVA5382 Ordered   9  Vitamin D3 TABS; Take 1 tablet daily; Therapy: (Recorded:02Jun2017) to Recorded    Allergies    1   No Known Drug Allergies    Plan  CKD (chronic kidney disease), stage III    · (1) BASIC METABOLIC PROFILE; Status:Active - Retrospective By Protocol  Authorization; Requested [facility]:[id number];    · (1) CBC/ PLT (NO DIFF); Status:Active - Retrospective By Protocol Authorization; Requested AQQ:[id number];   CKD (chronic kidney disease), stage III, Nephrolithiasis    · (1) URINALYSIS w URINE C/S REFLEX (will reflex a microscopy if leukocytes, occult  blood, or nitrites are not within normal limits); Status:Active - Retrospective By Protocol  Authorization; Requested EHI:[id number];   Nephrolithiasis    · (1) URINE EOS; Status:Active - Retrospective By Protocol Authorization;  Requested  UBV:[id number];     Signatures   Electronically signed by : Jimenez Duvall, ; Jun 21 2017 12:55PM EST                       (Author) Significant other/Self

## 2021-06-22 ENCOUNTER — TELEPHONE (OUTPATIENT)
Dept: PAIN MEDICINE | Facility: CLINIC | Age: 81
End: 2021-06-22

## 2021-06-28 ENCOUNTER — LAB (OUTPATIENT)
Dept: LAB | Facility: CLINIC | Age: 81
End: 2021-06-28
Payer: MEDICARE

## 2021-06-28 ENCOUNTER — TELEPHONE (OUTPATIENT)
Dept: NEPHROLOGY | Facility: CLINIC | Age: 81
End: 2021-06-28

## 2021-06-28 DIAGNOSIS — N18.31 STAGE 3A CHRONIC KIDNEY DISEASE (HCC): ICD-10-CM

## 2021-06-28 DIAGNOSIS — M1A.0720 IDIOPATHIC CHRONIC GOUT OF LEFT ANKLE WITHOUT TOPHUS: ICD-10-CM

## 2021-06-28 DIAGNOSIS — E03.9 HYPOTHYROIDISM, UNSPECIFIED TYPE: Chronic | ICD-10-CM

## 2021-06-28 DIAGNOSIS — R73.03 PREDIABETES: ICD-10-CM

## 2021-06-28 DIAGNOSIS — E78.5 HYPERLIPIDEMIA, UNSPECIFIED HYPERLIPIDEMIA TYPE: ICD-10-CM

## 2021-06-28 LAB
ALBUMIN SERPL BCP-MCNC: 3.1 G/DL (ref 3.5–5)
ALP SERPL-CCNC: 98 U/L (ref 46–116)
ALT SERPL W P-5'-P-CCNC: 20 U/L (ref 12–78)
ANION GAP SERPL CALCULATED.3IONS-SCNC: 6 MMOL/L (ref 4–13)
AST SERPL W P-5'-P-CCNC: 16 U/L (ref 5–45)
BACTERIA UR QL AUTO: NORMAL /HPF
BILIRUB SERPL-MCNC: 0.6 MG/DL (ref 0.2–1)
BILIRUB UR QL STRIP: NEGATIVE
BUN SERPL-MCNC: 22 MG/DL (ref 5–25)
CALCIUM ALBUM COR SERPL-MCNC: 9.9 MG/DL (ref 8.3–10.1)
CALCIUM SERPL-MCNC: 9.2 MG/DL (ref 8.3–10.1)
CHLORIDE SERPL-SCNC: 105 MMOL/L (ref 100–108)
CHOLEST SERPL-MCNC: 117 MG/DL (ref 50–200)
CLARITY UR: CLEAR
CO2 SERPL-SCNC: 29 MMOL/L (ref 21–32)
COLOR UR: YELLOW
CREAT SERPL-MCNC: 0.88 MG/DL (ref 0.6–1.3)
CREAT UR-MCNC: 20.6 MG/DL
ERYTHROCYTE [DISTWIDTH] IN BLOOD BY AUTOMATED COUNT: 14.9 % (ref 11.6–15.1)
EST. AVERAGE GLUCOSE BLD GHB EST-MCNC: 114 MG/DL
GFR SERPL CREATININE-BSD FRML MDRD: 62 ML/MIN/1.73SQ M
GLUCOSE P FAST SERPL-MCNC: 88 MG/DL (ref 65–99)
GLUCOSE UR STRIP-MCNC: NEGATIVE MG/DL
HBA1C MFR BLD: 5.6 %
HCT VFR BLD AUTO: 36.5 % (ref 34.8–46.1)
HDLC SERPL-MCNC: 59 MG/DL
HGB BLD-MCNC: 11.6 G/DL (ref 11.5–15.4)
HGB UR QL STRIP.AUTO: NEGATIVE
HYALINE CASTS #/AREA URNS LPF: NORMAL /LPF
KETONES UR STRIP-MCNC: NEGATIVE MG/DL
LDLC SERPL CALC-MCNC: 37 MG/DL (ref 0–100)
LEUKOCYTE ESTERASE UR QL STRIP: NEGATIVE
MAGNESIUM SERPL-MCNC: 2 MG/DL (ref 1.6–2.6)
MCH RBC QN AUTO: 29.3 PG (ref 26.8–34.3)
MCHC RBC AUTO-ENTMCNC: 31.8 G/DL (ref 31.4–37.4)
MCV RBC AUTO: 92 FL (ref 82–98)
NITRITE UR QL STRIP: NEGATIVE
NON-SQ EPI CELLS URNS QL MICRO: NORMAL /HPF
NONHDLC SERPL-MCNC: 58 MG/DL
PH UR STRIP.AUTO: 6 [PH]
PHOSPHATE SERPL-MCNC: 3.2 MG/DL (ref 2.3–4.1)
PLATELET # BLD AUTO: 164 THOUSANDS/UL (ref 149–390)
PMV BLD AUTO: 12.2 FL (ref 8.9–12.7)
POTASSIUM SERPL-SCNC: 3.9 MMOL/L (ref 3.5–5.3)
PROT SERPL-MCNC: 6.5 G/DL (ref 6.4–8.2)
PROT UR STRIP-MCNC: NEGATIVE MG/DL
PROT UR-MCNC: <6 MG/DL
PROT/CREAT UR: <0.29 MG/G{CREAT} (ref 0–0.1)
RBC # BLD AUTO: 3.96 MILLION/UL (ref 3.81–5.12)
RBC #/AREA URNS AUTO: NORMAL /HPF
SODIUM SERPL-SCNC: 140 MMOL/L (ref 136–145)
SP GR UR STRIP.AUTO: 1.01 (ref 1–1.03)
TRIGL SERPL-MCNC: 105 MG/DL
TSH SERPL DL<=0.05 MIU/L-ACNC: 1.73 UIU/ML (ref 0.36–3.74)
URATE SERPL-MCNC: 6.6 MG/DL (ref 2–6.8)
UROBILINOGEN UR QL STRIP.AUTO: 0.2 E.U./DL
WBC # BLD AUTO: 7.65 THOUSAND/UL (ref 4.31–10.16)
WBC #/AREA URNS AUTO: NORMAL /HPF

## 2021-06-28 PROCEDURE — 80061 LIPID PANEL: CPT

## 2021-06-28 PROCEDURE — 84156 ASSAY OF PROTEIN URINE: CPT

## 2021-06-28 PROCEDURE — 82570 ASSAY OF URINE CREATININE: CPT

## 2021-06-28 PROCEDURE — 83036 HEMOGLOBIN GLYCOSYLATED A1C: CPT

## 2021-06-28 PROCEDURE — 36415 COLL VENOUS BLD VENIPUNCTURE: CPT

## 2021-06-28 PROCEDURE — 83735 ASSAY OF MAGNESIUM: CPT

## 2021-06-28 PROCEDURE — 81001 URINALYSIS AUTO W/SCOPE: CPT

## 2021-06-28 PROCEDURE — 84100 ASSAY OF PHOSPHORUS: CPT

## 2021-06-28 PROCEDURE — 80053 COMPREHEN METABOLIC PANEL: CPT

## 2021-06-28 PROCEDURE — 84550 ASSAY OF BLOOD/URIC ACID: CPT

## 2021-06-28 PROCEDURE — 85027 COMPLETE CBC AUTOMATED: CPT

## 2021-06-28 PROCEDURE — 84443 ASSAY THYROID STIM HORMONE: CPT

## 2021-06-28 NOTE — TELEPHONE ENCOUNTER
----- Message from Lennox Kunz MD sent at 6/28/2021  3:08 PM EDT -----  Hello    Patient normally is followed up by Ms Aravind Baez  Please let the patient know that the renal function is stable  Creatinine 0 9  No changes for now  Will review in detail the appointment      Thank you    np

## 2021-06-28 NOTE — RESULT ENCOUNTER NOTE
Hello    Patient normally is followed up by Ms Prakash Handing  Please let the patient know that the renal function is stable  Creatinine 0 9  No changes for now  Will review in detail the appointment      Thank you    np

## 2021-06-30 PROBLEM — E11.22 TYPE 2 DIABETES MELLITUS WITH STAGE 3 CHRONIC KIDNEY DISEASE (HCC): Status: ACTIVE | Noted: 2021-06-30

## 2021-06-30 PROBLEM — N18.30 TYPE 2 DIABETES MELLITUS WITH STAGE 3 CHRONIC KIDNEY DISEASE (HCC): Status: ACTIVE | Noted: 2021-06-30

## 2021-07-01 ENCOUNTER — OFFICE VISIT (OUTPATIENT)
Dept: FAMILY MEDICINE CLINIC | Facility: CLINIC | Age: 81
End: 2021-07-01
Payer: MEDICARE

## 2021-07-01 VITALS
BODY MASS INDEX: 32.28 KG/M2 | OXYGEN SATURATION: 99 % | DIASTOLIC BLOOD PRESSURE: 72 MMHG | WEIGHT: 171 LBS | HEART RATE: 61 BPM | SYSTOLIC BLOOD PRESSURE: 124 MMHG | RESPIRATION RATE: 16 BRPM | HEIGHT: 61 IN

## 2021-07-01 DIAGNOSIS — N18.30 STAGE 3 CHRONIC KIDNEY DISEASE, UNSPECIFIED WHETHER STAGE 3A OR 3B CKD (HCC): Chronic | ICD-10-CM

## 2021-07-01 DIAGNOSIS — E78.5 HYPERLIPIDEMIA, UNSPECIFIED HYPERLIPIDEMIA TYPE: Primary | ICD-10-CM

## 2021-07-01 DIAGNOSIS — R45.89 DEPRESSED MOOD: ICD-10-CM

## 2021-07-01 DIAGNOSIS — E03.9 HYPOTHYROIDISM, UNSPECIFIED TYPE: Chronic | ICD-10-CM

## 2021-07-01 DIAGNOSIS — B02.9 HERPES ZOSTER WITHOUT COMPLICATION: ICD-10-CM

## 2021-07-01 DIAGNOSIS — I10 BENIGN ESSENTIAL HYPERTENSION: ICD-10-CM

## 2021-07-01 DIAGNOSIS — I50.32 CHRONIC DIASTOLIC (CONGESTIVE) HEART FAILURE (HCC): ICD-10-CM

## 2021-07-01 DIAGNOSIS — K21.9 GASTROESOPHAGEAL REFLUX DISEASE WITHOUT ESOPHAGITIS: ICD-10-CM

## 2021-07-01 DIAGNOSIS — I61.9 INTRAPARENCHYMAL HEMORRHAGE OF BRAIN (HCC): ICD-10-CM

## 2021-07-01 DIAGNOSIS — R73.03 PREDIABETES: ICD-10-CM

## 2021-07-01 DIAGNOSIS — Z00.00 MEDICARE ANNUAL WELLNESS VISIT, SUBSEQUENT: ICD-10-CM

## 2021-07-01 PROBLEM — R74.8 ALKALINE PHOSPHATASE ELEVATION: Status: RESOLVED | Noted: 2018-03-12 | Resolved: 2021-07-01

## 2021-07-01 PROBLEM — E87.0 HYPERNATREMIA: Status: RESOLVED | Noted: 2021-03-24 | Resolved: 2021-07-01

## 2021-07-01 PROBLEM — E11.42 DIABETIC POLYNEUROPATHY ASSOCIATED WITH TYPE 2 DIABETES MELLITUS (HCC): Status: RESOLVED | Noted: 2018-03-15 | Resolved: 2021-07-01

## 2021-07-01 PROBLEM — R07.9 CHEST PAIN: Status: RESOLVED | Noted: 2021-03-24 | Resolved: 2021-07-01

## 2021-07-01 PROBLEM — D50.8 IRON DEFICIENCY ANEMIA SECONDARY TO INADEQUATE DIETARY IRON INTAKE: Status: RESOLVED | Noted: 2018-06-04 | Resolved: 2021-07-01

## 2021-07-01 PROBLEM — Z23 NEED FOR VACCINATION: Status: RESOLVED | Noted: 2020-07-10 | Resolved: 2021-07-01

## 2021-07-01 PROBLEM — R41.82 AMS (ALTERED MENTAL STATUS): Status: RESOLVED | Noted: 2021-03-24 | Resolved: 2021-07-01

## 2021-07-01 PROBLEM — I50.33 ACUTE ON CHRONIC DIASTOLIC CONGESTIVE HEART FAILURE (HCC): Status: RESOLVED | Noted: 2017-06-02 | Resolved: 2021-07-01

## 2021-07-01 PROBLEM — E11.22 TYPE 2 DIABETES MELLITUS WITH STAGE 3 CHRONIC KIDNEY DISEASE (HCC): Status: RESOLVED | Noted: 2021-06-30 | Resolved: 2021-07-01

## 2021-07-01 PROBLEM — H10.32 ACUTE BACTERIAL CONJUNCTIVITIS OF LEFT EYE: Status: RESOLVED | Noted: 2020-07-10 | Resolved: 2021-07-01

## 2021-07-01 PROCEDURE — G0439 PPPS, SUBSEQ VISIT: HCPCS | Performed by: FAMILY MEDICINE

## 2021-07-01 PROCEDURE — 99215 OFFICE O/P EST HI 40 MIN: CPT | Performed by: FAMILY MEDICINE

## 2021-07-01 PROCEDURE — 1123F ACP DISCUSS/DSCN MKR DOCD: CPT | Performed by: FAMILY MEDICINE

## 2021-07-01 RX ORDER — AMLODIPINE BESYLATE 10 MG/1
10 TABLET ORAL DAILY
Qty: 90 TABLET | Refills: 1 | Status: SHIPPED | OUTPATIENT
Start: 2021-07-01 | End: 2021-11-08 | Stop reason: SDUPTHER

## 2021-07-01 RX ORDER — LEVOTHYROXINE SODIUM 88 UG/1
88 TABLET ORAL DAILY
Qty: 90 TABLET | Refills: 1 | Status: SHIPPED | OUTPATIENT
Start: 2021-07-01 | End: 2021-12-09 | Stop reason: SDUPTHER

## 2021-07-01 RX ORDER — DULOXETIN HYDROCHLORIDE 20 MG/1
20 CAPSULE, DELAYED RELEASE ORAL DAILY
Qty: 90 CAPSULE | Refills: 1 | Status: SHIPPED | OUTPATIENT
Start: 2021-07-01 | End: 2021-11-24 | Stop reason: SDUPTHER

## 2021-07-01 RX ORDER — OMEPRAZOLE 20 MG/1
20 CAPSULE, DELAYED RELEASE ORAL EVERY MORNING
Qty: 90 CAPSULE | Refills: 1 | Status: SHIPPED | OUTPATIENT
Start: 2021-07-01 | End: 2021-12-09 | Stop reason: SDUPTHER

## 2021-07-01 RX ORDER — GABAPENTIN 100 MG/1
100 CAPSULE ORAL 3 TIMES DAILY
Qty: 270 CAPSULE | Refills: 1 | Status: SHIPPED | OUTPATIENT
Start: 2021-07-01 | End: 2021-12-09

## 2021-07-01 NOTE — ASSESSMENT & PLAN NOTE
Wt Readings from Last 3 Encounters:   07/01/21 77 6 kg (171 lb)   06/07/21 78 5 kg (173 lb)   05/24/21 76 2 kg (168 lb)       Patient is asymptomatic    Continue monitoring per Cardiology

## 2021-07-01 NOTE — PROGRESS NOTES
Assessment and Plan:     Problem List Items Addressed This Visit        Endocrine    Hypothyroidism (Chronic)           Preventive health issues were discussed with patient, and age appropriate screening tests were ordered as noted in patient's After Visit Summary  Personalized health advice and appropriate referrals for health education or preventive services given if needed, as noted in patient's After Visit Summary       History of Present Illness:     Patient presents for Medicare Annual Wellness visit    Patient Care Team:  Alex Pickett MD as PCP - General  Myrna Rio, MD Darrol Hough, MD Garen Nyhan, MD Apolonio Market, MD Royston Huntington, MD as Endoscopist     Problem List:     Patient Active Problem List   Diagnosis    Benign essential HTN    [facility]    Hypothyroidism    Acute on chronic diastolic congestive heart failure ([facility] )    Arthropathy of knee    Hallux abductovalgus with bunions, unspecified laterality    CKD (chronic kidney disease), stage III ([facility]    Diverticulitis of colon    Chronic gout without tophus    Hiatal hernia    Hyperlipemia    Hyperuricemia    Acute pain of left knee    Morbid obesity with body mass index of 40 0-44 9 in adult ([facility]    Nephrolithiasis    Onychomycosis    WENDI (obstructive sleep apnea)    Umbilical hernia    Rupture of popliteal cyst    Pseudogout of left wrist    Alkaline phosphatase elevation    Diabetic polyneuropathy associated with type 2 diabetes mellitus (HCC)    Gastroesophageal reflux disease without esophagitis    Iron deficiency anemia secondary to inadequate dietary iron intake    Exertional shortness of breath    Intraparenchymal hemorrhage of brain (HCC)    Prediabetes    Status post right frontotemporal replacement cranioplasty    BMI 38 0-38 9,adult    Acute bacterial conjunctivitis of left eye    Need for vaccination    Chest pain    AMS (altered mental status)    Hypernatremia    Sciatica of left side    Spinal stenosis of lumbar region with neurogenic claudication    Type 2 diabetes mellitus with stage 3 chronic kidney disease (HCC)      Past Medical and Surgical History:     Past Medical History:   Diagnosis Date    Anxiety     Arthritis     joints    Cataract     Disease of thyroid gland     Eczema     GERD (gastroesophageal reflux disease)     Gout     Heart failure (HCC)     Hepatitis     Hiatal hernia     Hypertension     Onychomycosis     last assessed: 16    Orthopnea     resolved: 16    Pseudogout of left wrist     Sleep apnea     wears CPAP    Stroke [facility])      Past Surgical History:   Procedure Laterality Date    ABDOMINAL SURGERY          BRAIN HEMATOMA EVACUATION Right 2020    Procedure: Right decompressive craniectomy and evacuation of intraparenchymal hematoma; Surgeon: Eather Curling, MD;  [facility];  Service: Neurosurgery    BREAST SURGERY Right     cyst removal    CARPAL TUNNEL RELEASE Left     CARPAL TUNNEL RELEASE Right     CATARACT EXTRACTION       SECTION  1960    x1    COLONOSCOPY N/A 2018    Procedure: COLONOSCOPY;  Surgeon: Oliva Cruz MD;  [facility]; Service: Gastroenterology    DILATION AND CURETTAGE OF UTERUS  1967    EYE SURGERY Left 2006    cataracts    HERNIA REPAIR      umbilical hernia    INCISIONAL HERNIA REPAIR      incarcerated    KNEE ARTHROSCOPY Right     DE REPLACE SKULL PLATE/FLAP Right     Procedure: right frontotemporal replacement cranioplasty;  Surgeon: Eather Curling, MD;  [facility];  Service: Neurosurgery    DE XCAPSL CTRC RMVL INSJ IO LENS PROSTH W/O ECP Right 3/27/2017    Procedure: EXTRACTION EXTRACAPSULAR CATARACT PHACO INTRAOCULAR LENS (IOL);   Surgeon: Cathy Lopez MD;  [facility] MAIN OR;  Service: Ophthalmology      Family History:     Family History   Problem Relation Age of Onset    Diabetes Mother     Coronary artery disease Mother     Arthritis Mother     Hypertension Mother     Hypertension Father     Diabetes Brother     Diabetes Son     Arthritis Family       Social History:     Social History     Socioeconomic History    Marital status:      Spouse name: None    Number of children: None    Years of education: None    Highest education level: None   Occupational History    None   Tobacco Use    Smoking status: Never Smoker    Smokeless tobacco: Never Used   Vaping Use    Vaping Use: Never used   Substance and Sexual Activity    Alcohol use: Yes     Alcohol/week: 0 0 standard drinks     Comment: Rare   Drug use: Never    Sexual activity: Not Currently     Birth control/protection: Post-menopausal   Other Topics Concern    None   Social History Narrative    Daily coffee consumption    Lack of exercise    Sleeps 6-7 hours a day     Social Determinants of Health     Financial Resource Strain:     Difficulty of Paying Living Expenses:    Food Insecurity:     Worried About Running Out of Food in the Last Year:     [address] in the Last Year:    Transportation Needs: No Transportation Needs    Lack of Transportation (Medical): No    Lack of Transportation (Non-Medical):  No   Physical Activity:     Days of Exercise per Week:     Minutes of Exercise per Session:    Stress:     Feeling of Stress :    Social Connections:     Frequency of Communication with Friends and Family:     Frequency of Social Gatherings with Friends and Family:     Attends Worship Services:     Active Member of Clubs or Organizations:     Attends Club or Organization Meetings:     Marital Status:    Intimate Partner Violence:     Fear of Current or Ex-Partner:     Emotionally Abused:     Physically Abused:     Sexually Abused:       Medications and Allergies:     Current Outpatient Medications   Medication Sig Dispense Refill    amLODIPine (NORVASC) 10 mg tablet Take 1 tablet (10 mg total) by mouth daily 90 tablet 1    atorvastatin (LIPITOR) 40 mg tablet TAKE 1 TABLET (40 MG TOTAL) BY MOUTH EVERY EVENING 90 tablet 1    bumetanide (BUMEX) 2 mg tablet Take 1 tablet (2 mg total) by mouth daily 135 tablet 3    DULoxetine (CYMBALTA) 30 mg delayed release capsule Take 1 capsule (30 mg total) by mouth daily 30 capsule 0    gabapentin (NEURONTIN) 100 mg capsule Take 1 capsule (100 mg total) by mouth 3 (three) times a day 90 capsule 0    levothyroxine 88 mcg tablet Take 1 tablet (88 mcg total) by mouth daily 90 tablet 1    losartan (COZAAR) 25 mg tablet Take 2 tab in AM and 1 tab in  tablet 3    omeprazole (PriLOSEC) 20 mg delayed release capsule Take 1 capsule (20 mg total) by mouth every morning 90 capsule 1    spironolactone (ALDACTONE) 25 mg tablet Take 0 5 tablets (12 5 mg total) by mouth daily 45 tablet 3    Diclofenac Sodium (VOLTAREN) 1 % Apply 2 g topically 4 (four) times a day (Patient not taking: Reported on 5/14/2021) 100 g 1    tobramycin (TOBREX) 0 3 % SOLN Administer 1 drop into the left eye 2 (two) times a day (Patient not taking: Reported on 6/7/2021) 5 mL 0     No current facility-administered medications for this visit  No Known Allergies   Immunizations:     Immunization History   Administered Date(s) Administered    Influenza Split High Dose Preservative Free IM 10/09/2012, 09/24/2013, 09/09/2014, 10/02/2015, 09/23/2016, 10/24/2017    Influenza, high dose seasonal 0 7 mL 10/05/2018, 10/08/2020    Influenza, seasonal, injectable 10/25/2001, 10/28/2005, 10/10/2006, 10/12/2007, 10/14/2008, 09/17/2010, 09/23/2011    Pneumococcal Conjugate 13-Valent 11/15/2017    Pneumococcal Polysaccharide PPV23 10/10/2006, 07/10/2020    SARS-CoV-2 / COVID-19 mRNA IM (Pfizer-BioNTech) 01/31/2021, 02/20/2021    Zoster 11/30/2017    influenza, trivalent, adjuvanted 10/09/2019      Health Maintenance:      There are no preventive care reminders to display for this patient  Topic Date Due    Influenza Vaccine (1) 09/01/2021      Medicare Health Risk Assessment:     Resp 16   Ht 5' 1" (1 549 m)   Wt 77 6 kg (171 lb)   BMI 32 31 kg/m²      Zaire Saucedo is here for her Subsequent Wellness visit  Last Medicare Wellness visit information reviewed, patient interviewed, no change since last AWV  Health Risk Assessment:   Patient rates overall health as fair  Patient feels that their physical health rating is same  Patient is very satisfied with their life  Eyesight was rated as slightly worse  Hearing was rated as same  Patient feels that their emotional and mental health rating is same  Patients states they are never, rarely angry  Patient states they are never, rarely unusually tired/fatigued  Pain experienced in the last 7 days has been none  Patient states that she has experienced no weight loss or gain in last 6 months  Depression Screening:   PHQ-2 Score: 0      Fall Risk Screening: In the past year, patient has experienced: no history of falling in past year      Urinary Incontinence Screening:   Patient has not leaked urine accidently in the last six months  Home Safety:  Patient has trouble with stairs inside or outside of their home  Patient has working smoke alarms and has working carbon monoxide detector  Home safety hazards include: none  Nutrition:   Current diet is Regular  Medications:   Patient is currently taking over-the-counter supplements  OTC medications include: see medication list  Patient is able to manage medications  Activities of Daily Living (ADLs)/Instrumental Activities of Daily Living (IADLs):   Walk and transfer into and out of bed and chair?: Yes  Dress and groom yourself?: Yes    Bathe or shower yourself?: Yes    Feed yourself?  Yes  Do your laundry/housekeeping?: Yes  Manage your money, pay your bills and track your expenses?: Yes  Make your own meals?: Yes    Do your own shopping?: Yes    Previous Hospitalizations: Any hospitalizations or ED visits within the last 12 months?: Yes    How many hospitalizations have you had in the last year?: 3-4    Advance Care Planning:   Living will: Yes    Durable POA for healthcare: Yes    Advanced directive: Yes      Cognitive Screening:   Provider or family/friend/caregiver concerned regarding cognition?: No    PREVENTIVE SCREENINGS      Cardiovascular Screening:    General: Screening Not Indicated and History Lipid Disorder      Diabetes Screening:     General: Screening Not Indicated and History Diabetes      Colorectal Cancer Screening:     General: Screening Not Indicated and Risks and Benefits Discussed      Breast Cancer Screening:     General: Screening Not Indicated and Risks and Benefits Discussed      Cervical Cancer Screening:    General: Screening Not Indicated      Osteoporosis Screening:    General: Risks and Benefits Discussed and Patient Declines      Abdominal Aortic Aneurysm (AAA) Screening:        General: Risks and Benefits Discussed and Screening Not Indicated      Lung Cancer Screening:     General: Screening Not Indicated      Hepatitis C Screening:    General: Risks and Benefits Discussed and Patient Declines    Screening, Brief Intervention, and Referral to Treatment (SBIRT)    Screening  Typical number of drinks in a day: 0  Typical number of drinks in a week: 0  Interpretation: Low risk drinking behavior  Single Item Drug Screening:  How often have you used an illegal drug (including marijuana) or a prescription medication for non-medical reasons in the past year? never    Single Item Drug Screen Score: 0  Interpretation: Negative screen for possible drug use disorder    Other Counseling Topics:   Car/seat belt/driving safety, skin self-exam, sunscreen and calcium and vitamin D intake and regular weightbearing exercise         Zafar Meehan MD

## 2021-07-01 NOTE — ASSESSMENT & PLAN NOTE
Lab Results   Component Value Date    EGFR 62 06/28/2021    EGFR 46 03/25/2021    EGFR 44 03/24/2021    CREATININE 0 88 06/28/2021    CREATININE 1 13 03/25/2021    CREATININE 1 17 03/24/2021   Renal function is stable    Continue monitoring/management per Nephrology

## 2021-07-01 NOTE — PATIENT INSTRUCTIONS
Medicare Preventive Visit Patient Instructions  Thank you for completing your Welcome to Medicare Visit or Medicare Annual Wellness Visit today  Your next wellness visit will be due in one year (7/2/2022)  The screening/preventive services that you may require over the next 5-10 years are detailed below  Some tests may not apply to you based off risk factors and/or age  Screening tests ordered at today's visit but not completed yet may show as past due  Also, please note that scanned in results may not display below  Preventive Screenings:  Service Recommendations Previous Testing/Comments   Colorectal Cancer Screening  * Colonoscopy    * Fecal Occult Blood Test (FOBT)/Fecal Immunochemical Test (FIT)  * Fecal DNA/Cologuard Test  * Flexible Sigmoidoscopy Age: 54-65 years old   Colonoscopy: every 10 years (may be performed more frequently if at higher risk)  OR  FOBT/FIT: every 1 year  OR  Cologuard: every 3 years  OR  Sigmoidoscopy: every 5 years  Screening may be recommended earlier than age 48 if at higher risk for colorectal cancer  Also, an individualized decision between you and your healthcare provider will decide whether screening between the ages of 74-80 would be appropriate  Colonoscopy: Not on file  FOBT/FIT: Not on file  Cologuard: Not on file  Sigmoidoscopy: Not on file          Breast Cancer Screening Age: 36 years old  Frequency: every 1-2 years  Not required if history of left and right mastectomy Mammogram: 05/23/2017        Cervical Cancer Screening Between the ages of 21-29, pap smear recommended once every 3 years  Between the ages of 33-67, can perform pap smear with HPV co-testing every 5 years     Recommendations may differ for women with a history of total hysterectomy, cervical cancer, or abnormal pap smears in past  Pap Smear: Not on file    Screening Not Indicated   Hepatitis C Screening Once for adults born between [facility]  More frequently in patients at high risk for Hepatitis C Hep C Antibody: Not on file        Diabetes Screening 1-2 times per year if you're at risk for diabetes or have pre-diabetes Fasting glucose: 88 mg/dL   A1C: 5 6 %    Screening Not Indicated  History Diabetes   Cholesterol Screening Once every 5 years if you don't have a lipid disorder  May order more often based on risk factors  Lipid panel: 06/28/2021    Screening Not Indicated  History Lipid Disorder     Other Preventive Screenings Covered by Medicare:  1  Abdominal Aortic Aneurysm (AAA) Screening: covered once if your at risk  You're considered to be at risk if you have a family history of AAA  2  Lung Cancer Screening: covers low dose CT scan once per year if you meet all of the following conditions: (1) Age 50-69; (2) No signs or symptoms of lung cancer; (3) Current smoker or have quit smoking within the last 15 years; (4) You have a tobacco smoking history of at least 30 pack years (packs per day multiplied by number of years you smoked); (5) You get a written order from a healthcare provider  3  Glaucoma Screening: covered annually if you're considered high risk: (1) You have diabetes OR (2) Family history of glaucoma OR (3)  aged 48 and older OR (3)  American aged 72 and older  3  Osteoporosis Screening: covered every 2 years if you meet one of the following conditions: (1) You're estrogen deficient and at risk for osteoporosis based off medical history and other findings; (2) Have a vertebral abnormality; (3) On glucocorticoid therapy for more than 3 months; (4) Have primary hyperparathyroidism; (5) On osteoporosis medications and need to assess response to drug therapy  · Last bone density test (DXA Scan): 04/02/2019   5  HIV Screening: covered annually if you're between the age of 15-65  Also covered annually if you are younger than 13 and older than 72 with risk factors for HIV infection   For pregnant patients, it is covered up to 3 times per pregnancy  Immunizations:  Immunization Recommendations   Influenza Vaccine Annual influenza vaccination during flu season is recommended for all persons aged >= 6 months who do not have contraindications   Pneumococcal Vaccine (Prevnar and Pneumovax)  * Prevnar = PCV13  * Pneumovax = PPSV23   Adults 25-60 years old: 1-3 doses may be recommended based on certain risk factors  Adults 72 years old: Prevnar (PCV13) vaccine recommended followed by Pneumovax (PPSV23) vaccine  If already received PPSV23 since turning 65, then PCV13 recommended at least one year after PPSV23 dose  Hepatitis B Vaccine 3 dose series if at intermediate or high risk (ex: diabetes, end stage renal disease, liver disease)   Tetanus (Td) Vaccine - COST NOT COVERED BY MEDICARE PART B Following completion of primary series, a booster dose should be given every 10 years to maintain immunity against tetanus  Td may also be given as tetanus wound prophylaxis  Tdap Vaccine - COST NOT COVERED BY MEDICARE PART B Recommended at least once for all adults  For pregnant patients, recommended with each pregnancy  Shingles Vaccine (Shingrix) - COST NOT COVERED BY MEDICARE PART B  2 shot series recommended in those aged 48 and above     Health Maintenance Due:  There are no preventive care reminders to display for this patient  Immunizations Due:      Topic Date Due    Influenza Vaccine (1) 09/01/2021     Advance Directives   What are advance directives? Advance directives are legal documents that state your wishes and plans for medical care  These plans are made ahead of time in case you lose your ability to make decisions for yourself  Advance directives can apply to any medical decision, such as the treatments you want, and if you want to donate organs  What are the types of advance directives? There are many types of advance directives, and each state has rules about how to use them   You may choose a combination of any of the following:  · Living will: This is a written record of the treatment you want  You can also choose which treatments you do not want, which to limit, and which to stop at a certain time  This includes surgery, medicine, IV fluid, and tube feedings  · Durable power of  for healthcare [facility]): This is a written record that states who you want to make healthcare choices for you when you are unable to make them for yourself  This person, called a proxy, is usually a family member or a friend  You may choose more than 1 proxy  · Do not resuscitate (DNR) order:  A DNR order is used in case your heart stops beating or you stop breathing  It is a request not to have certain forms of treatment, such as CPR  A DNR order may be included in other types of advance directives  · Medical directive: This covers the care that you want if you are in a coma, near death, or unable to make decisions for yourself  You can list the treatments you want for each condition  Treatment may include pain medicine, surgery, blood transfusions, dialysis, IV or tube feedings, and a ventilator (breathing machine)  · Values history: This document has questions about your views, beliefs, and how you feel and think about life  This information can help others choose the care that you would choose  Why are advance directives important? An advance directive helps you control your care  Although spoken wishes may be used, it is better to have your wishes written down  Spoken wishes can be misunderstood, or not followed  Treatments may be given even if you do not want them  An advance directive may make it easier for your family to make difficult choices about your care  Weight Management   Why it is important to manage your weight:  Being overweight increases your risk of health conditions such as heart disease, high blood pressure, type 2 diabetes, and certain types of cancer   It can also increase your risk for osteoarthritis, sleep apnea, and other respiratory problems  Aim for a slow, steady weight loss  Even a small amount of weight loss can lower your risk of health problems  How to lose weight safely:  A safe and healthy way to lose weight is to eat fewer calories and get regular exercise  You can lose up about 1 pound a week by decreasing the number of calories you eat by 500 calories each day  Healthy meal plan for weight management:  A healthy meal plan includes a variety of foods, contains fewer calories, and helps you stay healthy  A healthy meal plan includes the following:  · Eat whole-grain foods more often  A healthy meal plan should contain fiber  Fiber is the part of grains, fruits, and vegetables that is not broken down by your body  Whole-grain foods are healthy and provide extra fiber in your diet  Some examples of whole-grain foods are whole-wheat breads and pastas, oatmeal, brown rice, and bulgur  · Eat a variety of vegetables every day  Include dark, leafy greens such as spinach, kale, elijah greens, and mustard greens  Eat yellow and orange vegetables such as carrots, sweet potatoes, and winter squash  · Eat a variety of fruits every day  Choose fresh or canned fruit (canned in its own juice or light syrup) instead of juice  Fruit juice has very little or no fiber  · Eat low-fat dairy foods  Drink fat-free (skim) milk or 1% milk  Eat fat-free yogurt and low-fat cottage cheese  Try low-fat cheeses such as mozzarella and other reduced-fat cheeses  · Choose meat and other protein foods that are low in fat  Choose beans or other legumes such as split peas or lentils  Choose fish, skinless poultry (chicken or turkey), or lean cuts of red meat (beef or pork)  Before you cook meat or poultry, cut off any visible fat  · Use less fat and oil  Try baking foods instead of frying them  Add less fat, such as margarine, sour cream, regular salad dressing and mayonnaise to foods  Eat fewer high-fat foods   Some examples of high-fat foods include french fries, doughnuts, ice cream, and cakes  · Eat fewer sweets  Limit foods and drinks that are high in sugar  This includes candy, cookies, regular soda, and sweetened drinks  Exercise:  Exercise at least 30 minutes per day on most days of the week  Some examples of exercise include walking, biking, dancing, and swimming  You can also fit in more physical activity by taking the stairs instead of the elevator or parking farther away from stores  Ask your healthcare provider about the best exercise plan for you  © Copyright [employer] 2018 Information is for End User's use only and may not be sold, redistributed or otherwise used for commercial purposes   All illustrations and images included in CareNotes® are the copyrighted property of A D A M [employer]  or [address] [place], [place] [id number] myles

## 2021-07-01 NOTE — PROGRESS NOTES
Subjective:      Patient ID: Toney Khanna is a 79 y o  female  Hypertension  This is a chronic problem  The current episode started more than 1 year ago  The problem is controlled  Associated symptoms include anxiety  Pertinent negatives include no chest pain, headaches or palpitations  Treatments tried: Amlodipine 10 milligram, losartan , per nephrology  The current treatment provides significant improvement  There are no compliance problems  Hypertensive end-organ damage includes kidney disease, CVA and heart failure  Identifiable causes of hypertension include a thyroid problem  Hyperlipidemia  This is a chronic problem  The current episode started more than 1 year ago  The problem is controlled  Recent lipid tests were reviewed and are normal  Pertinent negatives include no chest pain  Current antihyperlipidemic treatment includes statins (Atorvastatin 40 milligram)  The current treatment provides significant improvement of lipids  There are no compliance problems  Risk factors for coronary artery disease include dyslipidemia, hypertension and post-menopausal    Thyroid Problem  Presents for follow-up visit  Patient reports no anxiety, depressed mood, leg swelling, palpitations or tremors  The symptoms have been stable (Levothyroxine 88 micrograms)  Her past medical history is significant for heart failure and hyperlipidemia  Anxiety  Presents for follow-up visit  Patient reports no chest pain, depressed mood, irritability, nervous/anxious behavior, palpitations or suicidal ideas  Symptoms occur occasionally  The severity of symptoms is causing significant distress  Compliance with medications: Cymbalta 30 milligram daily  Treatment side effects: none  Patient is requesting to wean down to Cymbalta 20 milligram which she used to take in the past   Reporting significant improvement in her anxiety, since her low back pain has improved    She is currently following up with pain management  Patient has history of acid reflux  On omeprazole 20  Reports significant in symptoms  Past Medical History:   Diagnosis Date    Anxiety     Arthritis     joints    Cataract     Disease of thyroid gland     Eczema     GERD (gastroesophageal reflux disease)     Gout     Heart failure (HCC)     Hepatitis     Hiatal hernia     Hypertension     Onychomycosis     last assessed: 16    Orthopnea     resolved: 16    Pseudogout of left wrist     Sleep apnea     wears CPAP    Stroke [facility])        Family History   Problem Relation Age of Onset    Diabetes Mother     Coronary artery disease Mother     Arthritis Mother     Hypertension Mother     Hypertension Father     Diabetes Brother     Diabetes Son     Arthritis Family        Past Surgical History:   Procedure Laterality Date    ABDOMINAL SURGERY          BRAIN HEMATOMA EVACUATION Right 2020    Procedure: Right decompressive craniectomy and evacuation of intraparenchymal hematoma; Surgeon: Philly Pandya MD;  [facility]: [facility];  Service: Neurosurgery    BREAST SURGERY Right     cyst removal    CARPAL TUNNEL RELEASE Left     CARPAL TUNNEL RELEASE Right     CATARACT EXTRACTION       SECTION  1960    x1    COLONOSCOPY N/A 2018    Procedure: COLONOSCOPY;  Surgeon: Patricia Quispe MD;  Location: [facility]; Service: Gastroenterology    DILATION AND CURETTAGE OF UTERUS  1967    EYE SURGERY Left 2006    cataracts    HERNIA REPAIR      umbilical hernia    INCISIONAL HERNIA REPAIR      incarcerated    KNEE ARTHROSCOPY Right     CO REPLACE SKULL PLATE/FLAP Right     Procedure: right frontotemporal replacement cranioplasty;  Surgeon: Philly Pandya MD;  Location: [facility];  Service: Neurosurgery    CO XCAPSL CTRC RMVL INSJ IO LENS PROSTH W/O ECP Right 3/27/2017    Procedure: EXTRACTION EXTRACAPSULAR CATARACT PHACO INTRAOCULAR LENS (IOL);   Surgeon: Lindsey Marks MD; Location: [place] [address] MAIN OR;  Service: Ophthalmology        reports that she has never smoked  She has never used smokeless tobacco  She reports current alcohol use  She reports that she does not use drugs  Current Outpatient Medications:     amLODIPine (NORVASC) 10 mg tablet, Take 1 tablet (10 mg total) by mouth daily, Disp: 90 tablet, Rfl: 1    atorvastatin (LIPITOR) 40 mg tablet, TAKE 1 TABLET (40 MG TOTAL) BY MOUTH EVERY EVENING, Disp: 90 tablet, Rfl: 1    bumetanide (BUMEX) 2 mg tablet, Take 1 tablet (2 mg total) by mouth daily, Disp: 135 tablet, Rfl: 3    gabapentin (NEURONTIN) 100 mg capsule, Take 1 capsule (100 mg total) by mouth 3 (three) times a day, Disp: 270 capsule, Rfl: 1    levothyroxine 88 mcg tablet, Take 1 tablet (88 mcg total) by mouth daily, Disp: 90 tablet, Rfl: 1    losartan (COZAAR) 25 mg tablet, Take 2 tab in AM and 1 tab in PM, Disp: 180 tablet, Rfl: 3    omeprazole (PriLOSEC) 20 mg delayed release capsule, Take 1 capsule (20 mg total) by mouth every morning, Disp: 90 capsule, Rfl: 1    spironolactone (ALDACTONE) 25 mg tablet, Take 0 5 tablets (12 5 mg total) by mouth daily, Disp: 45 tablet, Rfl: 3    Diclofenac Sodium (VOLTAREN) 1 %, Apply 2 g topically 4 (four) times a day (Patient not taking: Reported on 5/14/2021), Disp: 100 g, Rfl: 1    DULoxetine (CYMBALTA) 20 mg capsule, Take 1 capsule (20 mg total) by mouth daily, Disp: 90 capsule, Rfl: 1    tobramycin (TOBREX) 0 3 % SOLN, Administer 1 drop into the left eye 2 (two) times a day (Patient not taking: Reported on 6/7/2021), Disp: 5 mL, Rfl: 0    The following portions of the patient's history were reviewed and updated as appropriate: allergies, current medications, past family history, past medical history, past social history, past surgical history and problem list     Review of Systems   Constitutional: Negative  Negative for irritability  HENT: Negative  Eyes: Negative  Respiratory: Negative      Cardiovascular: Negative  Negative for chest pain and palpitations  Gastrointestinal: Negative  Endocrine: Negative  Genitourinary: Negative  Musculoskeletal: Negative  Skin: Negative  Neurological: Negative  Negative for tremors and headaches  Psychiatric/Behavioral: Negative  Negative for suicidal ideas  The patient is not nervous/anxious  Objective:    /72 (BP Location: Left arm, Patient Position: Sitting, Cuff Size: Standard)   Pulse 61   Resp 16   Ht 5' 1" (1 549 m)   Wt 77 6 kg (171 lb)   SpO2 99%   BMI 32 31 kg/m²      Physical Exam  Constitutional:       Appearance: She is well-developed  Eyes:      Pupils: Pupils are equal, round, and reactive to light  Cardiovascular:      Rate and Rhythm: Normal rate and regular rhythm  Heart sounds: Murmur heard  Pulmonary:      Effort: Pulmonary effort is normal       Breath sounds: Normal breath sounds  Abdominal:      General: Bowel sounds are normal       Palpations: Abdomen is soft  Musculoskeletal:         General: Normal range of motion  Cervical back: Normal range of motion  Neurological:      Mental Status: She is alert and oriented to person, place, and time     Psychiatric:         Behavior: Behavior normal          Judgment: Judgment normal            Recent Results (from the past 1008 hour(s))   Comprehensive metabolic panel    Collection Time: 06/28/21  7:19 AM   Result Value Ref Range    Sodium 140 136 - 145 mmol/L    Potassium 3 9 3 5 - 5 3 mmol/L    Chloride 105 100 - 108 mmol/L    CO2 29 21 - 32 mmol/L    ANION GAP 6 4 - 13 mmol/L    BUN 22 5 - 25 mg/dL    Creatinine 0 88 0 60 - 1 30 mg/dL    Glucose, Fasting 88 65 - 99 mg/dL    Calcium 9 2 8 3 - 10 1 mg/dL    Corrected Calcium 9 9 8 3 - 10 1 mg/dL    AST 16 5 - 45 U/L    ALT 20 12 - 78 U/L    Alkaline Phosphatase 98 46 - 116 U/L    Total Protein 6 5 6 4 - 8 2 g/dL    Albumin 3 1 (L) 3 5 - 5 0 g/dL    Total Bilirubin 0 60 0 20 - 1 00 mg/dL    eGFR 62 ml/min/1 73sq m   Lipid panel    Collection Time: 06/28/21  7:19 AM   Result Value Ref Range    Cholesterol 117 50 - 200 mg/dL    Triglycerides 105 <=150 mg/dL    HDL, Direct 59 >=40 mg/dL    LDL Calculated 37 0 - 100 mg/dL    Non-HDL-Chol (CHOL-HDL) 58 mg/dl   TSH, 3rd generation with Free T4 reflex    Collection Time: 06/28/21  7:19 AM   Result Value Ref Range    TSH 3RD GENERATON 1 730 0 358 - 3 740 uIU/mL   Hemoglobin A1C    Collection Time: 06/28/21  7:19 AM   Result Value Ref Range    Hemoglobin A1C 5 6 Normal 3 8-5 6%; PreDiabetic 5 7-6 4%;  Diabetic >=6 5%; Glycemic control for adults with diabetes <7 0% %     mg/dl   CBC    Collection Time: 06/28/21  7:19 AM   Result Value Ref Range    WBC 7 65 4 31 - 10 16 Thousand/uL    RBC 3 96 3 81 - 5 12 Million/uL    Hemoglobin 11 6 11 5 - 15 4 g/dL    Hematocrit 36 5 34 8 - 46 1 %    MCV 92 82 - 98 fL    MCH 29 3 26 8 - 34 3 pg    MCHC 31 8 31 4 - 37 4 g/dL    RDW 14 9 11 6 - 15 1 %    Platelets 106 827 - 710 Thousands/uL    MPV 12 2 8 9 - 12 7 fL   Magnesium    Collection Time: 06/28/21  7:19 AM   Result Value Ref Range    Magnesium 2 0 1 6 - 2 6 mg/dL   Phosphorus    Collection Time: 06/28/21  7:19 AM   Result Value Ref Range    Phosphorus 3 2 2 3 - 4 1 mg/dL   Protein / creatinine ratio, urine    Collection Time: 06/28/21  7:19 AM   Result Value Ref Range    Creatinine, Ur 20 6 mg/dL    Protein Urine Random <6 mg/dL    Prot/Creat Ratio, Ur <0 29 (H) 0 00 - 0 10   Urinalysis with microscopic    Collection Time: 06/28/21  7:19 AM   Result Value Ref Range    Clarity, UA Clear     Color, UA Yellow     Specific Gravity, UA 1 009 1 003 - 1 030    pH, UA 6 0 4 5, 5 0, 5 5, 6 0, 6 5, 7 0, 7 5, 8 0    Glucose, UA Negative Negative mg/dl    Ketones, UA Negative Negative mg/dl    Blood, UA Negative Negative    Protein, UA Negative Negative mg/dl    Nitrite, UA Negative Negative    Bilirubin, UA Negative Negative    Urobilinogen, UA 0 2 0 2, 1 0 E U /dl E U /dl Leukocytes, UA Negative Negative    WBC, UA None Seen None Seen, 2-4 /hpf    RBC, UA None Seen None Seen, 2-4 /hpf    Hyaline Casts, UA None Seen None Seen /lpf    Bacteria, UA None Seen None Seen, Occasional /hpf    Epithelial Cells None Seen None Seen, Occasional /hpf   Uric acid    Collection Time: 06/28/21  7:19 AM   Result Value Ref Range    Uric Acid 6 6 2 0 - 6 8 mg/dL       Assessment/Plan:    No problem-specific Assessment & Plan notes found for this encounter  Problem List Items Addressed This Visit        Digestive    Gastroesophageal reflux disease without esophagitis (Chronic)     Stable on omeprazole 20 milligram   Continue same  Relevant Medications    omeprazole (PriLOSEC) 20 mg delayed release capsule       Endocrine    Hypothyroidism (Chronic)     TSH stable  Continue levothyroxine 88 mcg          Relevant Medications    levothyroxine 88 mcg tablet    Other Relevant Orders    TSH, 3rd generation with Free T4 reflex       Cardiovascular and Mediastinum    Benign essential hypertension     Continue amlodipine 10 milligram, losartan per nephrology         Relevant Medications    amLODIPine (NORVASC) 10 mg tablet    Other Relevant Orders    Comprehensive metabolic panel    Chronic diastolic (congestive) heart failure (HCC)     Wt Readings from Last 3 Encounters:   07/01/21 77 6 kg (171 lb)   06/07/21 78 5 kg (173 lb)   05/24/21 76 2 kg (168 lb)       Patient is asymptomatic    Continue monitoring per Cardiology           Relevant Medications    amLODIPine (NORVASC) 10 mg tablet       Nervous and Auditory    Intraparenchymal hemorrhage of brain (HCC)     Continue monitoring/management per Neurology            Genitourinary    CKD (chronic kidney disease), stage III (HCC) (Chronic)     Lab Results   Component Value Date    EGFR 62 06/28/2021    EGFR 46 03/25/2021    EGFR 44 03/24/2021    CREATININE 0 88 06/28/2021    CREATININE 1 13 03/25/2021    CREATININE 1 17 03/24/2021   Renal function is stable  Continue monitoring/management per Nephrology            Other    Hyperlipemia - Primary     LDL is at goal   Continue atorvastatin 40 mg  Metabolic labs/follow-up 6 month interval         Relevant Orders    Lipid panel    Medicare annual wellness visit, subsequent    Prediabetes     A1c normal with diet modifications,continue same  Continue monitoring         Depressed mood     Improved significantly  Patient requesting to reduce the dose of Cymbalta to 20 milligram         Relevant Medications    DULoxetine (CYMBALTA) 20 mg capsule      Other Visit Diagnoses     Herpes zoster without complication        Relevant Medications    gabapentin (NEURONTIN) 100 mg capsule          BMI Counseling: Body mass index is 32 31 kg/m²  The BMI is above normal  Nutrition recommendations include reducing portion sizes, decreasing overall calorie intake, 3-5 servings of fruits/vegetables daily, reducing fast food intake and consuming healthier snacks  Exercise recommendations include strength training exercises  I have spent 40 minutes with Patient and family today in which greater than 50% of this time was spent in counseling/coordination of care regarding Diagnostic results, Prognosis, Risks and benefits of tx options, Intructions for management, Patient and family education, Importance of tx compliance, Risk factor reductions and Impressions

## 2021-07-15 ENCOUNTER — OFFICE VISIT (OUTPATIENT)
Dept: NEPHROLOGY | Facility: CLINIC | Age: 81
End: 2021-07-15
Payer: MEDICARE

## 2021-07-15 VITALS
HEIGHT: 61 IN | SYSTOLIC BLOOD PRESSURE: 112 MMHG | DIASTOLIC BLOOD PRESSURE: 46 MMHG | BODY MASS INDEX: 31.72 KG/M2 | WEIGHT: 168 LBS

## 2021-07-15 DIAGNOSIS — N18.31 STAGE 3A CHRONIC KIDNEY DISEASE (HCC): Primary | ICD-10-CM

## 2021-07-15 DIAGNOSIS — I10 BENIGN ESSENTIAL HTN: ICD-10-CM

## 2021-07-15 PROCEDURE — 99214 OFFICE O/P EST MOD 30 MIN: CPT | Performed by: INTERNAL MEDICINE

## 2021-07-15 RX ORDER — LOSARTAN POTASSIUM 25 MG/1
TABLET ORAL
Qty: 270 TABLET | Refills: 3 | Status: SHIPPED | OUTPATIENT
Start: 2021-07-15 | End: 2021-11-08 | Stop reason: SDUPTHER

## 2021-07-15 NOTE — PROGRESS NOTES
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 on February 11th   Completed course of Keppra for seizure prophylaxis      3/30/2020 -Ronita Pierre was increased to 50 mg twice a day from 50 in the morning and 25 in the evening due to high blood pressures     4/3/2020 - spironolactone 12 5 mg daily was started due to continued high blood pressure     4/2020 - patient had presented for replacement current of cranioplasty on April 6   And subsequently was admitted to the rehab center postoperatively      March 2021- patient was admitted to hospital with not feeling well and   Headache  CT scan of the head with no acute intracranial pathology  The other symptoms of memory loss were also present  Patient was referred to geriatrician  April 2021 - sciatic pain  Received brief steroid course    5/2021 - back pain  Went to ER  Given pred taper       54-year-old female with a past medical history of chronic kidney disease, venous stasis, HTN, hypothyroidism, lumbar canal stenosis, Anxiety, GERD, neuropathy, Gout, EF 22-98%, Grade 2 diastolic dysfunction who presents for follow up for CKD  To note, patient's  has now passed away in August      1) CKD II-III - Prior year had nl Cr prior to aug 2016 and then had SHABANA during diarrhea episode  To note, patient also has a long standing history of HTN  Creatinine in 2013, 0 9 mg/dL  Cr early 2016 was 0 80-0 9 mg/dL; then increased starting in august 2016 to 2 3 mg/dL and has fluctuated since  CT scan in 2016, kidneys appearing normal at that time       Etiology of CKD may be long standing HTN and ATN   Baseline Cr ~ 1 1 -1 3 mg/dL     Most recent creat 0 88 mg/dL 6/2021 which is below baseline  Electrolytes stable  UA bland     - Urine eos 0%;   - A1c controlled prior  at 5 7% on December 8th in 2020  -U PCR stable  0 15 stable --> 0 18 gm/gm in 6/2020 --> 0 1 December 8th --> 0 12 gm/gm (3/22/2021) --> too low to quantify (6/2021)  -SPEP unrevealing  -UPEP unrevealing  - C3, C4 unrevealing  - Renal u/s with R 10 3 cm, L 10 4 cm, renal cortex 1 cm b/l; both kidneys slightly reduced in size; lower pole of R kidney is non obstructing calculus  - Pt follows with Urology team for nephrolithiasis  - No NSAIDs, the patient is not currently taking NSAID  -nuc stress test per Cardiology in Jan 2019 unrevealing  - repeat renal u/s 1/2019 - unrevealing with exception of renal cortical atrophy; but also 6 mm non shadowing calculus      Plan:     - no changes today  - but did ask pt to bring BP cuff to next visit to confirm  - asked pt to check BMP 3 times per week and call if consistently below 110    - may need to reduce amlodipine if BP remains low at home  - currently bp at home is not low  And is appropriate per review of home log  - labs in 4-5 months  - appt in 5 months   -  continue current antihypertensive regimen   Patient is on losartan 50 mg in the morning and 25 mg in the evening, spironolactone 12 5 mg daily, amlodipine 10 mg daily, Bumex 2 mg daily (has low diastolics and syst is 730A)     2) SOB - Volume - follows with Cardiology     - on Bumetanide  - euvolemic in the office with trace lower extremity edema     3) HTN -      - cont losartan, bumex and amlodipine, spironolactone  -  due to bradycardia, beta-blocker was held prior  - no changes today  See above    - SBP at home, April till now has been 120-140s and occ 063 one time systolic        4) hypothyroid - as per Primary Care Physician     5) HPL - Primary Care following       6) Electrolytes - stable     7) MBD -     - Vit D 50 7 in nov 2019  - PTH improved 89 in nov 2019 --> 78 6 in June 2020     8) gout -    -monitor for flare     9) back pain - follows with Pain management    - pain sig improved with inj     10) Colonoscopy in feb with internal hemorrhoids and polyp removed       11) alk phos elevation     -improved     12) Anemia - Hb stable     -hemoglobin stable     13) Mitral valve calcification     - moderate to severe mitral regurgitation  - per Cardiology team     14) elevated D-dimer prior-patient was given duplex of lower extremities which was unrevealing     15) knee pain after fall in august 2020     - saw Orthopedic team  - steroid injection was given     16) intracranial hemorrhage with mass effect requiring craniotomy on 2/11/2020     - now is status post replacement of cranioplasty in April 2020     17)  Anxiety- patient was started on Cymbalta  PCP is attempting off of gabapentin     - there were periods of not taking cymbalta     18)   Zoster infection in November 2020-treated with Valtrex per primary team     It was a pleasure evaluating your patient  Thank you for allowing our team to participate in the care of Ms Rosario Johnson  Please do not hesitate to contact our team if further issues/questions shall arise in the interim    No problem-specific Assessment & Plan notes found for this encounter  HPI:    Patient reports back pain improved since injection; No fevers, chills, nausea, vomiting  PATIENT INSTRUCTIONS:    There are no Patient Instructions on file for this visit  OBJECTIVE:  Current Weight: Weight - Scale: 76 2 kg (168 lb)  Vitals:    07/15/21 1358   Weight: 76 2 kg (168 lb)   Height: 5' 1" (1 549 m)    Body mass index is 31 74 kg/m²  REVIEW OF SYSTEMS:    Review of Systems   Constitutional: Negative  Negative for fatigue  HENT: Negative  Eyes: Negative  Respiratory: Negative  Negative for shortness of breath  Cardiovascular: Negative  Negative for leg swelling  Gastrointestinal: Negative  Endocrine: Negative  Genitourinary: Negative  Negative for difficulty urinating  Musculoskeletal: Negative  Skin: Negative  Allergic/Immunologic: Negative  Neurological: Negative  Hematological: Negative  Psychiatric/Behavioral: Negative  All other systems reviewed and are negative  PHYSICAL EXAM:      Physical Exam  Vitals and nursing note reviewed  Constitutional:       General: She is not in acute distress  Appearance: She is well-developed  She is not diaphoretic  HENT:      Head: Normocephalic and atraumatic  Eyes:      General: No scleral icterus  Right eye: No discharge  Left eye: No discharge  Conjunctiva/sclera: Conjunctivae normal    Neck:      Vascular: No JVD  Cardiovascular:      Rate and Rhythm: Normal rate and regular rhythm  Heart sounds: Murmur heard  No friction rub  No gallop  Pulmonary:      Effort: Pulmonary effort is normal  No respiratory distress  Breath sounds: Normal breath sounds  No wheezing or rales  Abdominal:      General: Bowel sounds are normal  There is no distension  Palpations: Abdomen is soft  Tenderness: There is no abdominal tenderness  There is no rebound  Musculoskeletal:         General: No tenderness or deformity  Normal range of motion  Cervical back: Normal range of motion and neck supple  Right lower leg: Edema present  Left lower leg: Edema present  Comments: Trace to 1+ pitting edema, improved   Skin:     General: Skin is warm and dry  Coloration: Skin is not pale  Findings: No erythema or rash  Neurological:      Mental Status: She is alert and oriented to person, place, and time  Coordination: Coordination normal    Psychiatric:         Behavior: Behavior normal          Thought Content:  Thought content normal          Judgment: Judgment normal          Medications:    Current Outpatient Medications:     amLODIPine (NORVASC) 10 mg tablet, Take 1 tablet (10 mg total) by mouth daily, Disp: 90 tablet, Rfl: 1    atorvastatin (LIPITOR) 40 mg tablet, TAKE 1 TABLET (40 MG TOTAL) BY MOUTH EVERY EVENING, Disp: 90 tablet, Rfl: 1    bumetanide (BUMEX) 2 mg tablet, Take 1 tablet (2 mg total) by mouth daily, Disp: 135 tablet, Rfl: 3    DULoxetine (CYMBALTA) 20 mg capsule, Take 1 capsule (20 mg total) by mouth daily, Disp: 90 capsule, Rfl: 1    gabapentin (NEURONTIN) 100 mg capsule, Take 1 capsule (100 mg total) by mouth 3 (three) times a day, Disp: 270 capsule, Rfl: 1    levothyroxine 88 mcg tablet, Take 1 tablet (88 mcg total) by mouth daily, Disp: 90 tablet, Rfl: 1    losartan (COZAAR) 25 mg tablet, Take 2 tab in AM and 1 tab in PM, Disp: 180 tablet, Rfl: 3    omeprazole (PriLOSEC) 20 mg delayed release capsule, Take 1 capsule (20 mg total) by mouth every morning, Disp: 90 capsule, Rfl: 1    spironolactone (ALDACTONE) 25 mg tablet, Take 0 5 tablets (12 5 mg total) by mouth daily, Disp: 45 tablet, Rfl: 3    Diclofenac Sodium (VOLTAREN) 1 %, Apply 2 g topically 4 (four) times a day (Patient not taking: Reported on 5/14/2021), Disp: 100 g, Rfl: 1    tobramycin (TOBREX) 0 3 % SOLN, Administer 1 drop into the left eye 2 (two) times a day (Patient not taking: Reported on 6/7/2021), Disp: 5 mL, Rfl: 0    Laboratory Results:        Invalid input(s): ALBUMIN    Results for orders placed or performed in visit on 06/28/21   Comprehensive metabolic panel   Result Value Ref Range    Sodium 140 136 - 145 mmol/L    Potassium 3 9 3 5 - 5 3 mmol/L    Chloride 105 100 - 108 mmol/L    CO2 29 21 - 32 mmol/L    ANION GAP 6 4 - 13 mmol/L    BUN 22 5 - 25 mg/dL    Creatinine 0 88 0 60 - 1 30 mg/dL    Glucose, Fasting 88 65 - 99 mg/dL    Calcium 9 2 8 3 - 10 1 mg/dL    Corrected Calcium 9 9 8 3 - 10 1 mg/dL    AST 16 5 - 45 U/L    ALT 20 12 - 78 U/L    Alkaline Phosphatase 98 46 - 116 U/L    Total Protein 6 5 6 4 - 8 2 g/dL    Albumin 3 1 (L) 3 5 - 5 0 g/dL    Total Bilirubin 0 60 0 20 - 1 00 mg/dL    eGFR 62 ml/min/1 73sq m Lipid panel   Result Value Ref Range    Cholesterol 117 50 - 200 mg/dL    Triglycerides 105 <=150 mg/dL    HDL, Direct 59 >=40 mg/dL    LDL Calculated 37 0 - 100 mg/dL    Non-HDL-Chol (CHOL-HDL) 58 mg/dl   TSH, 3rd generation with Free T4 reflex   Result Value Ref Range    TSH 3RD GENERATON 1 730 0 358 - 3 740 uIU/mL   Hemoglobin A1C   Result Value Ref Range    Hemoglobin A1C 5 6 Normal 3 8-5 6%; PreDiabetic 5 7-6 4%;  Diabetic >=6 5%; Glycemic control for adults with diabetes <7 0% %     mg/dl   CBC   Result Value Ref Range    WBC 7 65 4 31 - 10 16 Thousand/uL    RBC 3 96 3 81 - 5 12 Million/uL    Hemoglobin 11 6 11 5 - 15 4 g/dL    Hematocrit 36 5 34 8 - 46 1 %    MCV 92 82 - 98 fL    MCH 29 3 26 8 - 34 3 pg    MCHC 31 8 31 4 - 37 4 g/dL    RDW 14 9 11 6 - 15 1 %    Platelets 459 356 - 270 Thousands/uL    MPV 12 2 8 9 - 12 7 fL   Magnesium   Result Value Ref Range    Magnesium 2 0 1 6 - 2 6 mg/dL   Phosphorus   Result Value Ref Range    Phosphorus 3 2 2 3 - 4 1 mg/dL   Protein / creatinine ratio, urine   Result Value Ref Range    Creatinine, Ur 20 6 mg/dL    Protein Urine Random <6 mg/dL    Prot/Creat Ratio, Ur <0 29 (H) 0 00 - 0 10   Urinalysis with microscopic   Result Value Ref Range    Clarity, UA Clear     Color, UA Yellow     Specific Gravity, UA 1 009 1 003 - 1 030    pH, UA 6 0 4 5, 5 0, 5 5, 6 0, 6 5, 7 0, 7 5, 8 0    Glucose, UA Negative Negative mg/dl    Ketones, UA Negative Negative mg/dl    Blood, UA Negative Negative    Protein, UA Negative Negative mg/dl    Nitrite, UA Negative Negative    Bilirubin, UA Negative Negative    Urobilinogen, UA 0 2 0 2, 1 0 E U /dl E U /dl    Leukocytes, UA Negative Negative    WBC, UA None Seen None Seen, 2-4 /hpf    RBC, UA None Seen None Seen, 2-4 /hpf    Hyaline Casts, UA None Seen None Seen /lpf    Bacteria, UA None Seen None Seen, Occasional /hpf    Epithelial Cells None Seen None Seen, Occasional /hpf   Uric acid   Result Value Ref Range    Uric Acid 6 6 2 0 - 6 8 mg/dL

## 2021-07-15 NOTE — PATIENT INSTRUCTIONS
1) Avoid NSAIDS - (Example - motrin, advil, ibuprofen, aleve, exederin, etc)  2) Always follow a low salt diet  3) no changes to your medications today  4) please bring your blood pressure cuff to your next appt  5) if your blood pressures are less than 110 (top number) more than two days in a row, please call   Check Blood pressures three times weekly  6) appointment in 4-5 months  7) labwork in 4 months

## 2021-07-15 NOTE — LETTER
July 15, 2021     Hiren Lenz MD  [address]  [facility] [address]    Patient: Leilani Shelley   YOB: 1940   Date of Visit: 7/15/2021       Dear Dr Vonnie Coyne:    Thank you for referring Johnson Fitzpatrick to me for evaluation  Below are my notes for this consultation  If you have questions, please do not hesitate to call me  I look forward to following your patient along with you  Sincerely,        Sonia Turner MD        CC: No Recipients  Sonia Turner MD  7/15/2021  2:31 PM  Sign when Signing Visit  [address] [facility] 79 y o  female MRN: [id number]  7/15/2021    Reason for Visit: CKD III    ASSESSMENT and PLAN:    I had the pleasure of seeing Ms Sesar Javier today in the renal clinic for the continued management of CKD III      2/2020 - February with 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 on February 11th   Completed course of Keppra for seizure prophylaxis      3/30/2020 -Pervis Siad was increased to 50 mg twice a day from 50 in the morning and 25 in the evening due to high blood pressures     4/3/2020 - spironolactone 12 5 mg daily was started due to continued high blood pressure     4/2020 - patient had presented for replacement current of cranioplasty on April 6   And subsequently was admitted to the rehab center postoperatively      March 2021- patient was admitted to hospital with not feeling well and   Headache  CT scan of the head with no acute intracranial pathology  The other symptoms of memory loss were also present  Patient was referred to geriatrician  April 2021 - sciatic pain  Received brief steroid course    5/2021 - back pain  Went to ER   Given pred taper       60-year-old female with a past medical history of chronic kidney disease, venous stasis, HTN, hypothyroidism, lumbar canal stenosis, Anxiety, GERD, neuropathy, Gout, EF 98-67%, Grade 2 diastolic dysfunction who presents for follow up for CKD  To note, patient's  has now passed away in August      1) CKD II-III - Prior year had nl Cr prior to aug 2016 and then had SHABANA during diarrhea episode  To note, patient also has a long standing history of HTN  Creatinine in 2013, 0 9 mg/dL  Cr early 2016 was 0 80-0 9 mg/dL; then increased starting in august 2016 to 2 3 mg/dL and has fluctuated since  CT scan in 2016, kidneys appearing normal at that time       Etiology of CKD may be long standing HTN and ATN  Baseline Cr ~ 1 1 -1 3 mg/dL     Most recent creat 0 88 mg/dL 6/2021 which is below baseline  Electrolytes stable  UA bland     - Urine eos 0%;   - A1c controlled prior  at 5 7% on December 8th in 2020  -U PCR stable  0 15 stable --> 0 18 gm/gm in 6/2020 --> 0 1 December 8th --> 0 12 gm/gm (3/22/2021) --> too low to quantify (6/2021)  -SPEP unrevealing  -UPEP unrevealing  - C3, C4 unrevealing  - Renal u/s with R 10 3 cm, L 10 4 cm, renal cortex 1 cm b/l; both kidneys slightly reduced in size; lower pole of R kidney is non obstructing calculus  - Pt follows with Urology team for nephrolithiasis  - No NSAIDs, the patient is not currently taking NSAID  -nuc stress test per Cardiology in Jan 2019 unrevealing  - repeat renal u/s 1/2019 - unrevealing with exception of renal cortical atrophy; but also 6 mm non shadowing calculus      Plan:     - no changes today  - but did ask pt to bring BP cuff to next visit to confirm  - asked pt to check BMP 3 times per week and call if consistently below 110    - may need to reduce amlodipine if BP remains low at home  - currently bp at home is not low   And is appropriate per review of home log  - labs in 4-5 months  - appt in 5 months   -  continue current antihypertensive regimen   Patient is on losartan 50 mg in the morning and 25 mg in the evening, spironolactone 12 5 mg daily, amlodipine 10 mg daily, Bumex 2 mg daily (has low diastolics and syst is 090D)     2) SOB - Volume - follows with Cardiology     - on Bumetanide  - euvolemic in the office with trace lower extremity edema     3) HTN -      - cont losartan, bumex and amlodipine, spironolactone  -  due to bradycardia, beta-blocker was held prior  - no changes today  See above    - SBP at home, April till now has been 120-140s and occ 468 one time systolic     4) hypothyroid - as per Primary Care Physician     5) HPL - Primary Care following       6) Electrolytes - stable     7) MBD -     - Vit D 50 7 in nov 2019  - PTH improved 89 in nov 2019 --> 78 6 in June 2020     8) gout -      -monitor for flare     9) back pain - follows with Pain management    - pain sig improved with inj     10) Colonoscopy in feb with internal hemorrhoids and polyp removed       11) alk phos elevation     -improved     12) Anemia - Hb stable     -hemoglobin stable     13) Mitral valve calcification     - moderate to severe mitral regurgitation  - per Cardiology team     14) elevated D-dimer prior-patient was given duplex of lower extremities which was unrevealing     15) knee pain after fall in august 2020     - saw Orthopedic team  - steroid injection was given     16) intracranial hemorrhage with mass effect requiring craniotomy on 2/11/2020     - now is status post replacement of cranioplasty in April 2020     17)  Anxiety- patient was started on Cymbalta  PCP is attempting off of gabapentin     - there were periods of not taking cymbalta     18)   Zoster infection in November 2020-treated with Valtrex per primary team     It was a pleasure evaluating your patient  Thank you for allowing our team to participate in the care of Ms Sanjuana Hightower  Please do not hesitate to contact our team if further issues/questions shall arise in the interim    No problem-specific Assessment & Plan notes found for this encounter  HPI:    Patient reports back pain improved since injection; No fevers, chills, nausea, vomiting       PATIENT INSTRUCTIONS:    There are no Patient Instructions on file for this visit  OBJECTIVE:  Current Weight: Weight - Scale: 76 2 kg (168 lb)  Vitals:    07/15/21 1358   Weight: 76 2 kg (168 lb)   Height: 5' 1" (1 549 m)    Body mass index is 31 74 kg/m²  REVIEW OF SYSTEMS:    Review of Systems   Constitutional: Negative  Negative for fatigue  HENT: Negative  Eyes: Negative  Respiratory: Negative  Negative for shortness of breath  Cardiovascular: Negative  Negative for leg swelling  Gastrointestinal: Negative  Endocrine: Negative  Genitourinary: Negative  Negative for difficulty urinating  Musculoskeletal: Negative  Skin: Negative  Allergic/Immunologic: Negative  Neurological: Negative  Hematological: Negative  Psychiatric/Behavioral: Negative  All other systems reviewed and are negative  PHYSICAL EXAM:      Physical Exam  Vitals and nursing note reviewed  Constitutional:       General: She is not in acute distress  Appearance: She is well-developed  She is not diaphoretic  HENT:      Head: Normocephalic and atraumatic  Eyes:      General: No scleral icterus  Right eye: No discharge  Left eye: No discharge  Conjunctiva/sclera: Conjunctivae normal    Neck:      Vascular: No JVD  Cardiovascular:      Rate and Rhythm: Normal rate and regular rhythm  Heart sounds: Murmur heard  No friction rub  No gallop  Pulmonary:      Effort: Pulmonary effort is normal  No respiratory distress  Breath sounds: Normal breath sounds  No wheezing or rales  Abdominal:      General: Bowel sounds are normal  There is no distension  Palpations: Abdomen is soft  Tenderness: There is no abdominal tenderness  There is no rebound  Musculoskeletal:         General: No tenderness or deformity  Normal range of motion  Cervical back: Normal range of motion and neck supple  Right lower leg: Edema present  Left lower leg: Edema present        Comments: Trace to 1+ pitting edema, improved   Skin:     General: Skin is warm and dry  Coloration: Skin is not pale  Findings: No erythema or rash  Neurological:      Mental Status: She is alert and oriented to person, place, and time  Coordination: Coordination normal    Psychiatric:         Behavior: Behavior normal          Thought Content:  Thought content normal          Judgment: Judgment normal          Medications:    Current Outpatient Medications:     amLODIPine (NORVASC) 10 mg tablet, Take 1 tablet (10 mg total) by mouth daily, Disp: 90 tablet, Rfl: 1    atorvastatin (LIPITOR) 40 mg tablet, TAKE 1 TABLET (40 MG TOTAL) BY MOUTH EVERY EVENING, Disp: 90 tablet, Rfl: 1    bumetanide (BUMEX) 2 mg tablet, Take 1 tablet (2 mg total) by mouth daily, Disp: 135 tablet, Rfl: 3    DULoxetine (CYMBALTA) 20 mg capsule, Take 1 capsule (20 mg total) by mouth daily, Disp: 90 capsule, Rfl: 1    gabapentin (NEURONTIN) 100 mg capsule, Take 1 capsule (100 mg total) by mouth 3 (three) times a day, Disp: 270 capsule, Rfl: 1    levothyroxine 88 mcg tablet, Take 1 tablet (88 mcg total) by mouth daily, Disp: 90 tablet, Rfl: 1    losartan (COZAAR) 25 mg tablet, Take 2 tab in AM and 1 tab in PM, Disp: 180 tablet, Rfl: 3    omeprazole (PriLOSEC) 20 mg delayed release capsule, Take 1 capsule (20 mg total) by mouth every morning, Disp: 90 capsule, Rfl: 1    spironolactone (ALDACTONE) 25 mg tablet, Take 0 5 tablets (12 5 mg total) by mouth daily, Disp: 45 tablet, Rfl: 3    Diclofenac Sodium (VOLTAREN) 1 %, Apply 2 g topically 4 (four) times a day (Patient not taking: Reported on 5/14/2021), Disp: 100 g, Rfl: 1    tobramycin (TOBREX) 0 3 % SOLN, Administer 1 drop into the left eye 2 (two) times a day (Patient not taking: Reported on 6/7/2021), Disp: 5 mL, Rfl: 0    Laboratory Results:        Invalid input(s): ALBUMIN    Results for orders placed or performed in visit on 06/28/21   Comprehensive metabolic panel   Result Value Ref Range    Sodium 140 136 - 145 mmol/L    Potassium 3 9 3 5 - 5 3 mmol/L    Chloride 105 100 - 108 mmol/L    CO2 29 21 - 32 mmol/L    ANION GAP 6 4 - 13 mmol/L    BUN 22 5 - 25 mg/dL    Creatinine 0 88 0 60 - 1 30 mg/dL    Glucose, Fasting 88 65 - 99 mg/dL    Calcium 9 2 8 3 - 10 1 mg/dL    Corrected Calcium 9 9 8 3 - 10 1 mg/dL    AST 16 5 - 45 U/L    ALT 20 12 - 78 U/L    Alkaline Phosphatase 98 46 - 116 U/L    Total Protein 6 5 6 4 - 8 2 g/dL    Albumin 3 1 (L) 3 5 - 5 0 g/dL    Total Bilirubin 0 60 0 20 - 1 00 mg/dL    eGFR 62 ml/min/1 73sq m   Lipid panel   Result Value Ref Range    Cholesterol 117 50 - 200 mg/dL    Triglycerides 105 <=150 mg/dL    HDL, Direct 59 >=40 mg/dL    LDL Calculated 37 0 - 100 mg/dL    Non-HDL-Chol (CHOL-HDL) 58 mg/dl   TSH, 3rd generation with Free T4 reflex   Result Value Ref Range    TSH 3RD GENERATON 1 730 0 358 - 3 740 uIU/mL   Hemoglobin A1C   Result Value Ref Range    Hemoglobin A1C 5 6 Normal 3 8-5 6%; PreDiabetic 5 7-6 4%;  Diabetic >=6 5%; Glycemic control for adults with diabetes <7 0% %     mg/dl   CBC   Result Value Ref Range    WBC 7 65 4 31 - 10 16 Thousand/uL    RBC 3 96 3 81 - 5 12 Million/uL    Hemoglobin 11 6 11 5 - 15 4 g/dL    Hematocrit 36 5 34 8 - 46 1 %    MCV 92 82 - 98 fL    MCH 29 3 26 8 - 34 3 pg    MCHC 31 8 31 4 - 37 4 g/dL    RDW 14 9 11 6 - 15 1 %    Platelets 954 483 - 406 Thousands/uL    MPV 12 2 8 9 - 12 7 fL   Magnesium   Result Value Ref Range    Magnesium 2 0 1 6 - 2 6 mg/dL   Phosphorus   Result Value Ref Range    Phosphorus 3 2 2 3 - 4 1 mg/dL   Protein / creatinine ratio, urine   Result Value Ref Range    Creatinine, Ur 20 6 mg/dL    Protein Urine Random <6 mg/dL    Prot/Creat Ratio, Ur <0 29 (H) 0 00 - 0 10   Urinalysis with microscopic   Result Value Ref Range    Clarity, UA Clear     Color, UA Yellow     Specific Gravity, UA 1 009 1 003 - 1 030    pH, UA 6 0 4 5, 5 0, 5 5, 6 0, 6 5, 7 0, 7 5, 8 0    Glucose, UA Negative Negative mg/dl Ketones, UA Negative Negative mg/dl    Blood, UA Negative Negative    Protein, UA Negative Negative mg/dl    Nitrite, UA Negative Negative    Bilirubin, UA Negative Negative    Urobilinogen, UA 0 2 0 2, 1 0 E U /dl E U /dl    Leukocytes, UA Negative Negative    WBC, UA None Seen None Seen, 2-4 /hpf    RBC, UA None Seen None Seen, 2-4 /hpf    Hyaline Casts, UA None Seen None Seen /lpf    Bacteria, UA None Seen None Seen, Occasional /hpf    Epithelial Cells None Seen None Seen, Occasional /hpf   Uric acid   Result Value Ref Range    Uric Acid 6 6 2 0 - 6 8 mg/dL

## 2021-07-26 ENCOUNTER — TELEPHONE (OUTPATIENT)
Dept: FAMILY MEDICINE CLINIC | Facility: CLINIC | Age: 81
End: 2021-07-26

## 2021-07-26 ENCOUNTER — OFFICE VISIT (OUTPATIENT)
Dept: FAMILY MEDICINE CLINIC | Facility: CLINIC | Age: 81
End: 2021-07-26
Payer: MEDICARE

## 2021-07-26 ENCOUNTER — TELEPHONE (OUTPATIENT)
Dept: NEPHROLOGY | Facility: CLINIC | Age: 81
End: 2021-07-26

## 2021-07-26 VITALS
DIASTOLIC BLOOD PRESSURE: 82 MMHG | SYSTOLIC BLOOD PRESSURE: 142 MMHG | WEIGHT: 171.6 LBS | OXYGEN SATURATION: 98 % | HEART RATE: 76 BPM | RESPIRATION RATE: 18 BRPM | HEIGHT: 61 IN | BODY MASS INDEX: 32.4 KG/M2

## 2021-07-26 DIAGNOSIS — I10 BENIGN ESSENTIAL HYPERTENSION: Primary | ICD-10-CM

## 2021-07-26 PROCEDURE — 99213 OFFICE O/P EST LOW 20 MIN: CPT | Performed by: FAMILY MEDICINE

## 2021-07-26 NOTE — TELEPHONE ENCOUNTER
Patient called in worried about high BP she does not take BP regularly but she was not feeling well this morning  She called PCP they asked patient to inform us  Patients BP reading this morning was 134/108, then she took it again and it was 126/50  I explained to patient that I would inform Dr Raven Ruiz, but she should take her BP regularly  Patient states if it continues to go up and she does not feel well she will go to the ER for assessment as she has history of stroke  Patient feeling dizzy and headache- I advised to inform PCP about this as well

## 2021-07-26 NOTE — TELEPHONE ENCOUNTER
Left  A very detailed message for pt regarding the above, asked that  she calls office to confirm that she received message and understand the instructions

## 2021-07-26 NOTE — TELEPHONE ENCOUNTER
Hello    I tried to call pt - no answer    Saw she went to PCP today  BP 140s/80s    Can you please ask pt to check BP twice daily and keep log and send in on Thursday or Friday    If BP rises above 830 systolic for more than two checks in a row, she can increase losartan to 50 mg twice daily   Currently she is on 50 mg in AM and 25 mg in PM  - she should call us if this happens as we will need to send her BMP slip for BMP check one week after change    Thank you    np

## 2021-07-26 NOTE — PROGRESS NOTES
Subjective:      Patient ID: William Layton is a 79 y o  female  HPI  Patient is here for a blood pressure check  Patient has been monitoring her blood pressure at home as advised by Nephrology  According to her home blood pressure instrument the blood pressure was fluctuating initially 134/108, later came down to 126/50  Patient noticed headache and dizziness  Since she has a past history of right-sided parietal and temporal hemorrhage causing mass effect requiring craniotomy, patient was concerned  Patient is here to get her blood pressure reassessed  Currently she denies any symptoms of headache or dizziness  Patient lost her , lives with her son and his family  Sometimes gets overwhelmed  Is currently on Cymbalta 20 milligrams which according to her helps with her anxiety and depression  Past Medical History:   Diagnosis Date    Anxiety     Arthritis     joints    Cataract     Disease of thyroid gland     Eczema     GERD (gastroesophageal reflux disease)     Gout     Heart failure (HCC)     Hepatitis     Hiatal hernia     Hypertension     Onychomycosis     last assessed: 16    Orthopnea     resolved: 16    Pseudogout of left wrist     Sleep apnea     wears CPAP    Stroke [facility])        Family History   Problem Relation Age of Onset    Diabetes Mother     Coronary artery disease Mother     Arthritis Mother     Hypertension Mother     Hypertension Father     Diabetes Brother     Diabetes Son     Arthritis Family        Past Surgical History:   Procedure Laterality Date    ABDOMINAL SURGERY          BRAIN HEMATOMA EVACUATION Right 2020    Procedure: Right decompressive craniectomy and evacuation of intraparenchymal hematoma;   Surgeon: Cortez Wilhelm MD;  [facility]: [facility];  Service: Neurosurgery    BREAST SURGERY Right     cyst removal    CARPAL TUNNEL RELEASE Left     CARPAL TUNNEL RELEASE Right     CATARACT EXTRACTION       SECTION  1960    x1    COLONOSCOPY N/A 2018    Procedure: COLONOSCOPY;  Surgeon: Cheryle Gavia, MD;  Location: [facility] GI LAB; Service: Gastroenterology    DILATION AND CURETTAGE OF UTERUS  1967    EYE SURGERY Left 2006    cataracts    HERNIA REPAIR      umbilical hernia    INCISIONAL HERNIA REPAIR      incarcerated    KNEE ARTHROSCOPY Right     RI REPLACE SKULL PLATE/FLAP Right [id number]    Procedure: right frontotemporal replacement cranioplasty;  Surgeon: Ashley Vargas MD;  Location:  MAIN OR;  Service: Neurosurgery    RI XCAPSL CTRC RMVL INSJ IO LENS PROSTH W/O ECP Right 3/27/2017    Procedure: EXTRACTION EXTRACAPSULAR CATARACT PHACO INTRAOCULAR LENS (IOL); Surgeon: Bandar Cavazos MD;  Location: [facility] MAIN OR;  Service: Ophthalmology        reports that she has never smoked  She has never used smokeless tobacco  She reports current alcohol use  She reports that she does not use drugs        Current Outpatient Medications:     amLODIPine (NORVASC) 10 mg tablet, Take 1 tablet (10 mg total) by mouth daily, Disp: 90 tablet, Rfl: 1    atorvastatin (LIPITOR) 40 mg tablet, TAKE 1 TABLET (40 MG TOTAL) BY MOUTH EVERY EVENING, Disp: 90 tablet, Rfl: 1    bumetanide (BUMEX) 2 mg tablet, Take 1 tablet (2 mg total) by mouth daily, Disp: 135 tablet, Rfl: 3    DULoxetine (CYMBALTA) 20 mg capsule, Take 1 capsule (20 mg total) by mouth daily, Disp: 90 capsule, Rfl: 1    gabapentin (NEURONTIN) 100 mg capsule, Take 1 capsule (100 mg total) by mouth 3 (three) times a day, Disp: 270 capsule, Rfl: 1    levothyroxine 88 mcg tablet, Take 1 tablet (88 mcg total) by mouth daily, Disp: 90 tablet, Rfl: 1    losartan (COZAAR) 25 mg tablet, Take 2 tab in AM and 1 tab in PM, Disp: 270 tablet, Rfl: 3    omeprazole (PriLOSEC) 20 mg delayed release capsule, Take 1 capsule (20 mg total) by mouth every morning, Disp: 90 capsule, Rfl: 1    spironolactone (ALDACTONE) 25 mg tablet, Take 0 5 tablets (12 5 mg total) by mouth daily, Disp: 45 tablet, Rfl: 3    Diclofenac Sodium (VOLTAREN) 1 %, Apply 2 g topically 4 (four) times a day (Patient not taking: Reported on 5/14/2021), Disp: 100 g, Rfl: 1    tobramycin (TOBREX) 0 3 % SOLN, Administer 1 drop into the left eye 2 (two) times a day (Patient not taking: Reported on 6/7/2021), Disp: 5 mL, Rfl: 0    The following portions of the patient's history were reviewed and updated as appropriate: allergies, current medications, past family history, past medical history, past social history, past surgical history and problem list     Review of Systems   Constitutional: Negative  HENT: Negative  Eyes: Negative  Respiratory: Negative  Cardiovascular: Negative  Gastrointestinal: Negative  Endocrine: Negative  Genitourinary: Negative  Musculoskeletal: Negative  Skin: Negative  Neurological: Negative  Psychiatric/Behavioral: Negative  Objective:    /82   Pulse 76   Resp 18   Ht 5' 1" (1 549 m)   Wt 77 8 kg (171 lb 9 6 oz)   SpO2 98%   BMI 32 42 kg/m²      Physical Exam  Constitutional:       Appearance: She is well-developed  Eyes:      Pupils: Pupils are equal, round, and reactive to light  Cardiovascular:      Rate and Rhythm: Normal rate and regular rhythm  Heart sounds: Normal heart sounds  Pulmonary:      Effort: Pulmonary effort is normal       Breath sounds: Normal breath sounds  Abdominal:      General: Bowel sounds are normal       Palpations: Abdomen is soft  Musculoskeletal:         General: Normal range of motion  Cervical back: Normal range of motion  Neurological:      Mental Status: She is alert and oriented to person, place, and time  Cranial Nerves: No cranial nerve deficit  Motor: No weakness        Coordination: Coordination normal       Gait: Gait normal       Deep Tendon Reflexes: Reflexes normal    Psychiatric:         Behavior: Behavior normal          Judgment: Judgment normal  Recent Results (from the past 1008 hour(s))   Comprehensive metabolic panel    Collection Time: 06/28/21  7:19 AM   Result Value Ref Range    Sodium 140 136 - 145 mmol/L    Potassium 3 9 3 5 - 5 3 mmol/L    Chloride 105 100 - 108 mmol/L    CO2 29 21 - 32 mmol/L    ANION GAP 6 4 - 13 mmol/L    BUN 22 5 - 25 mg/dL    Creatinine 0 88 0 60 - 1 30 mg/dL    Glucose, Fasting 88 65 - 99 mg/dL    Calcium 9 2 8 3 - 10 1 mg/dL    Corrected Calcium 9 9 8 3 - 10 1 mg/dL    AST 16 5 - 45 U/L    ALT 20 12 - 78 U/L    Alkaline Phosphatase 98 46 - 116 U/L    Total Protein 6 5 6 4 - 8 2 g/dL    Albumin 3 1 (L) 3 5 - 5 0 g/dL    Total Bilirubin 0 60 0 20 - 1 00 mg/dL    eGFR 62 ml/min/1 73sq m   Lipid panel    Collection Time: 06/28/21  7:19 AM   Result Value Ref Range    Cholesterol 117 50 - 200 mg/dL    Triglycerides 105 <=150 mg/dL    HDL, Direct 59 >=40 mg/dL    LDL Calculated 37 0 - 100 mg/dL    Non-HDL-Chol (CHOL-HDL) 58 mg/dl   TSH, 3rd generation with Free T4 reflex    Collection Time: 06/28/21  7:19 AM   Result Value Ref Range    TSH 3RD GENERATON 1 730 0 358 - 3 740 uIU/mL   Hemoglobin A1C    Collection Time: 06/28/21  7:19 AM   Result Value Ref Range    Hemoglobin A1C 5 6 Normal 3 8-5 6%; PreDiabetic 5 7-6 4%;  Diabetic >=6 5%; Glycemic control for adults with diabetes <7 0% %     mg/dl   CBC    Collection Time: 06/28/21  7:19 AM   Result Value Ref Range    WBC 7 65 4 31 - 10 16 Thousand/uL    RBC 3 96 3 81 - 5 12 Million/uL    Hemoglobin 11 6 11 5 - 15 4 g/dL    Hematocrit 36 5 34 8 - 46 1 %    MCV 92 82 - 98 fL    MCH 29 3 26 8 - 34 3 pg    MCHC 31 8 31 4 - 37 4 g/dL    RDW 14 9 11 6 - 15 1 %    Platelets 307 902 - 348 Thousands/uL    MPV 12 2 8 9 - 12 7 fL   Magnesium    Collection Time: 06/28/21  7:19 AM   Result Value Ref Range    Magnesium 2 0 1 6 - 2 6 mg/dL   Phosphorus    Collection Time: 06/28/21  7:19 AM   Result Value Ref Range    Phosphorus 3 2 2 3 - 4 1 mg/dL   Protein / creatinine ratio, urine Collection Time: 06/28/21  7:19 AM   Result Value Ref Range    Creatinine, Ur 20 6 mg/dL    Protein Urine Random <6 mg/dL    Prot/Creat Ratio, Ur <0 29 (H) 0 00 - 0 10   Urinalysis with microscopic    Collection Time: 06/28/21  7:19 AM   Result Value Ref Range    Clarity, UA Clear     Color, UA Yellow     Specific Gravity, UA 1 009 1 003 - 1 030    pH, UA 6 0 4 5, 5 0, 5 5, 6 0, 6 5, 7 0, 7 5, 8 0    Glucose, UA Negative Negative mg/dl    Ketones, UA Negative Negative mg/dl    Blood, UA Negative Negative    Protein, UA Negative Negative mg/dl    Nitrite, UA Negative Negative    Bilirubin, UA Negative Negative    Urobilinogen, UA 0 2 0 2, 1 0 E U /dl E U /dl    Leukocytes, UA Negative Negative    WBC, UA None Seen None Seen, 2-4 /hpf    RBC, UA None Seen None Seen, 2-4 /hpf    Hyaline Casts, UA None Seen None Seen /lpf    Bacteria, UA None Seen None Seen, Occasional /hpf    Epithelial Cells None Seen None Seen, Occasional /hpf   Uric acid    Collection Time: 06/28/21  7:19 AM   Result Value Ref Range    Uric Acid 6 6 2 0 - 6 8 mg/dL       Assessment/Plan:         Problem List Items Addressed This Visit        Cardiovascular and Mediastinum    Benign essential hypertension - Primary     Patient's blood pressure is except double  At this time I have advised her to continue taking all the medications as advised by Nephrology  Continue monitoring her blood pressure  continue Cymbalta 20 milligram for mood disorder  I advised her to go to the ER if her symptoms worsen progressively

## 2021-07-26 NOTE — ASSESSMENT & PLAN NOTE
Patient's blood pressure is except double  At this time I have advised her to continue taking all the medications as advised by Nephrology  Continue monitoring her blood pressure  continue Cymbalta 20 milligram for mood disorder  I advised her to go to the ER if her symptoms worsen progressively

## 2021-08-01 NOTE — PROGRESS NOTES
02/26/20 0700   Pain Assessment   Pain Assessment No/denies pain   Pain Score No Pain   Restrictions/Precautions   Precautions Bed/chair alarms;Cognitive; Fall Risk;Seizure;Supervision on toilet/commode   Weight Bearing Restrictions No   ROM Restrictions No   Braces or Orthoses Craniectomy Helmet   Lifestyle   Autonomy "I think the things you guys tell me to do are silly, I know what I have to do "   Tub/Shower Transfer   Findings Dry tub xfer w/ use of tub clamp and shower chair to simulate home context  Pt inisisted on utilizing built-in horizontal grab bar stating, "It's here so I use it, I will be fine at home " Edu provided however pt demo difficulty w/ comprehension  Trialled x2, despite approach, pt cont to utilize grab bar and req Min A to steady  Sit to Stand   Type of Assistance Needed Supervision   Amount of Physical Assistance Provided No physical assistance   Sit to Stand CARE Score 4   Bed-Chair Transfer   Type of Assistance Needed Supervision   Amount of Physical Assistance Provided No physical assistance   Chair/Bed-to-Chair Transfer CARE Score 4   Toileting Hygiene   Type of Assistance Needed Supervision   Amount of Physical Assistance Provided No physical assistance   Comment CS in stance   [address]i [address] Score 4   Toilet Transfer   Type of Assistance Needed Supervision   Amount of Physical Assistance Provided No physical assistance   Comment CGA wihout use of grab bars to simulate home context   Toilet Transfer CARE Score 4   Cognition   Comments Reviewed handouts on pt's current deficits and signs/symptoms of stroke  Pt able to recall 75% of message w/ handouts, however cont to req prompts/cues to problem solve  Cont to demo poor attention to task requiring redirection several times t/o session  Completed 10 cause-effect cards w/ focus on problem solving w/ picture, req Mod prompts as pt w/ poor attention to task and oftem misses important details in pictures   Add'lly completed medical situation problem solving, req Min prompts to reach correct answer however pt unable to problem solve 'waking up and realizing you are on the floor'  Pt became frustrated saying, "I won't fall at home, I won't do that " demo poor cognitive flexibility, impaired insight, and poor frustration tolerance  Additional Activities   Additional Activities Comments Focused on RW management training and barrier negotiation  Pt cont to step over objects on floor and attempt to move items out of path rather than go around  Cont to recommend 24 hour S to inc safety and dec fall risk  Assessment   Treatment Assessment Seen for OT w/ focus on barrier negotiation w/ RW, tub transfer, cognitive retraining, and toileting  Cont to demo poor insight into deficits, impaired cognitive flexibility, poor attention to task, impaired problem solving, and poor STM  Pt w/ inc frustration this session, req inc encouragement to participate for full 90 minutes  Limited by fatigue and dec endurance, rest breaks given during fxnl mobility  Will cont to benefit from self-care management, repetitive safety training, cognitive retraining, and RW management  Prognosis Fair   Problem List Decreased endurance; Impaired balance;Decreased mobility; Decreased cognition; Impaired judgement;Decreased safety awareness   Plan   Treatment/Interventions ADL retraining;Functional transfer training; Therapeutic exercise; Endurance training;Patient/family training;Equipment eval/education;Cognitive reorientation   Progress Progressing toward goals   Recommendation   OT Discharge Recommendation 24 hour supervision/assist  (OP OT)   OT Therapy Minutes   OT Time In 0700   OT Time Out 0830   OT Total Time (minutes) 90   OT Mode of treatment - Individual (minutes) 90   OT Mode of treatment - Concurrent (minutes) 0   OT Mode of treatment - Group (minutes) 0   OT Mode of treatment - Co-treat (minutes) 0   OT Mode of Treatment - Total time(minutes) 90 minutes   OT Cumulative Minutes 630   Therapy Time missed   Time missed?  No No

## 2021-08-30 DIAGNOSIS — I10 BENIGN ESSENTIAL HTN: ICD-10-CM

## 2021-08-30 RX ORDER — SPIRONOLACTONE 25 MG/1
12.5 TABLET ORAL DAILY
Qty: 45 TABLET | Refills: 3 | Status: SHIPPED | OUTPATIENT
Start: 2021-08-30 | End: 2021-12-05 | Stop reason: SDUPTHER

## 2021-11-08 DIAGNOSIS — I10 BENIGN ESSENTIAL HTN: ICD-10-CM

## 2021-11-08 DIAGNOSIS — I10 BENIGN ESSENTIAL HYPERTENSION: ICD-10-CM

## 2021-11-08 DIAGNOSIS — I50.33 ACUTE ON CHRONIC DIASTOLIC CONGESTIVE HEART FAILURE (HCC): ICD-10-CM

## 2021-11-08 RX ORDER — AMLODIPINE BESYLATE 10 MG/1
10 TABLET ORAL DAILY
Qty: 90 TABLET | Refills: 0 | Status: SHIPPED | OUTPATIENT
Start: 2021-11-08 | End: 2021-12-09 | Stop reason: SDUPTHER

## 2021-11-10 RX ORDER — BUMETANIDE 2 MG/1
2 TABLET ORAL DAILY
Qty: 135 TABLET | Refills: 0 | Status: SHIPPED | OUTPATIENT
Start: 2021-11-10 | End: 2022-01-12 | Stop reason: SDUPTHER

## 2021-11-10 RX ORDER — LOSARTAN POTASSIUM 25 MG/1
TABLET ORAL
Qty: 270 TABLET | Refills: 0 | Status: SHIPPED | OUTPATIENT
Start: 2021-11-10 | End: 2022-01-12 | Stop reason: SDUPTHER

## 2021-11-24 DIAGNOSIS — R45.89 DEPRESSED MOOD: ICD-10-CM

## 2021-11-24 RX ORDER — DULOXETIN HYDROCHLORIDE 20 MG/1
20 CAPSULE, DELAYED RELEASE ORAL DAILY
Qty: 90 CAPSULE | Refills: 0 | Status: SHIPPED | OUTPATIENT
Start: 2021-11-24 | End: 2022-02-27 | Stop reason: SDUPTHER

## 2021-11-27 ENCOUNTER — IMMUNIZATIONS (OUTPATIENT)
Dept: FAMILY MEDICINE CLINIC | Facility: HOSPITAL | Age: 81
End: 2021-11-27

## 2021-11-27 DIAGNOSIS — Z23 ENCOUNTER FOR IMMUNIZATION: Primary | ICD-10-CM

## 2021-11-27 PROCEDURE — 91300 COVID-19 PFIZER VACC 0.3 ML: CPT

## 2021-11-27 PROCEDURE — 0001A COVID-19 PFIZER VACC 0.3 ML: CPT

## 2021-11-30 ENCOUNTER — LAB (OUTPATIENT)
Dept: LAB | Facility: CLINIC | Age: 81
End: 2021-11-30
Payer: MEDICARE

## 2021-11-30 DIAGNOSIS — E78.5 HYPERLIPIDEMIA, UNSPECIFIED HYPERLIPIDEMIA TYPE: ICD-10-CM

## 2021-11-30 DIAGNOSIS — N18.31 STAGE 3A CHRONIC KIDNEY DISEASE (HCC): ICD-10-CM

## 2021-11-30 DIAGNOSIS — I10 BENIGN ESSENTIAL HTN: ICD-10-CM

## 2021-11-30 DIAGNOSIS — I10 BENIGN ESSENTIAL HYPERTENSION: ICD-10-CM

## 2021-11-30 DIAGNOSIS — E03.9 HYPOTHYROIDISM, UNSPECIFIED TYPE: Chronic | ICD-10-CM

## 2021-11-30 LAB
ALBUMIN SERPL BCP-MCNC: 3.4 G/DL (ref 3.5–5)
ALP SERPL-CCNC: 111 U/L (ref 46–116)
ALT SERPL W P-5'-P-CCNC: 21 U/L (ref 12–78)
ANION GAP SERPL CALCULATED.3IONS-SCNC: 4 MMOL/L (ref 4–13)
AST SERPL W P-5'-P-CCNC: 14 U/L (ref 5–45)
BACTERIA UR QL AUTO: NORMAL /HPF
BILIRUB SERPL-MCNC: 0.81 MG/DL (ref 0.2–1)
BILIRUB UR QL STRIP: NEGATIVE
BUN SERPL-MCNC: 32 MG/DL (ref 5–25)
CALCIUM ALBUM COR SERPL-MCNC: 9.8 MG/DL (ref 8.3–10.1)
CALCIUM SERPL-MCNC: 9.3 MG/DL (ref 8.3–10.1)
CHLORIDE SERPL-SCNC: 103 MMOL/L (ref 100–108)
CHOLEST SERPL-MCNC: 124 MG/DL
CLARITY UR: CLEAR
CO2 SERPL-SCNC: 31 MMOL/L (ref 21–32)
COLOR UR: YELLOW
CREAT SERPL-MCNC: 1.04 MG/DL (ref 0.6–1.3)
CREAT UR-MCNC: <13 MG/DL
ERYTHROCYTE [DISTWIDTH] IN BLOOD BY AUTOMATED COUNT: 14.8 % (ref 11.6–15.1)
GFR SERPL CREATININE-BSD FRML MDRD: 50 ML/MIN/1.73SQ M
GLUCOSE P FAST SERPL-MCNC: 94 MG/DL (ref 65–99)
GLUCOSE UR STRIP-MCNC: NEGATIVE MG/DL
HCT VFR BLD AUTO: 38.6 % (ref 34.8–46.1)
HDLC SERPL-MCNC: 62 MG/DL
HGB BLD-MCNC: 12.2 G/DL (ref 11.5–15.4)
HGB UR QL STRIP.AUTO: NEGATIVE
HYALINE CASTS #/AREA URNS LPF: NORMAL /LPF
KETONES UR STRIP-MCNC: NEGATIVE MG/DL
LDLC SERPL CALC-MCNC: 47 MG/DL (ref 0–100)
LEUKOCYTE ESTERASE UR QL STRIP: NEGATIVE
MAGNESIUM SERPL-MCNC: 2 MG/DL (ref 1.6–2.6)
MCH RBC QN AUTO: 28.5 PG (ref 26.8–34.3)
MCHC RBC AUTO-ENTMCNC: 31.6 G/DL (ref 31.4–37.4)
MCV RBC AUTO: 90 FL (ref 82–98)
NITRITE UR QL STRIP: NEGATIVE
NON-SQ EPI CELLS URNS QL MICRO: NORMAL /HPF
NONHDLC SERPL-MCNC: 62 MG/DL
PH UR STRIP.AUTO: 7 [PH]
PHOSPHATE SERPL-MCNC: 3.4 MG/DL (ref 2.3–4.1)
PLATELET # BLD AUTO: 169 THOUSANDS/UL (ref 149–390)
PMV BLD AUTO: 11.7 FL (ref 8.9–12.7)
POTASSIUM SERPL-SCNC: 3.8 MMOL/L (ref 3.5–5.3)
PROT SERPL-MCNC: 7.3 G/DL (ref 6.4–8.2)
PROT UR STRIP-MCNC: NEGATIVE MG/DL
PROT UR-MCNC: <6 MG/DL
PTH-INTACT SERPL-MCNC: 107.1 PG/ML (ref 18.4–80.1)
RBC # BLD AUTO: 4.28 MILLION/UL (ref 3.81–5.12)
RBC #/AREA URNS AUTO: NORMAL /HPF
SODIUM SERPL-SCNC: 138 MMOL/L (ref 136–145)
SP GR UR STRIP.AUTO: 1.01 (ref 1–1.03)
TRIGL SERPL-MCNC: 73 MG/DL
TSH SERPL DL<=0.05 MIU/L-ACNC: 3.5 UIU/ML (ref 0.36–3.74)
UROBILINOGEN UR QL STRIP.AUTO: 0.2 E.U./DL
WBC # BLD AUTO: 6.01 THOUSAND/UL (ref 4.31–10.16)
WBC #/AREA URNS AUTO: NORMAL /HPF

## 2021-11-30 PROCEDURE — 84156 ASSAY OF PROTEIN URINE: CPT

## 2021-11-30 PROCEDURE — 83970 ASSAY OF PARATHORMONE: CPT

## 2021-11-30 PROCEDURE — 84100 ASSAY OF PHOSPHORUS: CPT

## 2021-11-30 PROCEDURE — 80061 LIPID PANEL: CPT

## 2021-11-30 PROCEDURE — 36415 COLL VENOUS BLD VENIPUNCTURE: CPT

## 2021-11-30 PROCEDURE — 85027 COMPLETE CBC AUTOMATED: CPT

## 2021-11-30 PROCEDURE — 82570 ASSAY OF URINE CREATININE: CPT

## 2021-11-30 PROCEDURE — 83735 ASSAY OF MAGNESIUM: CPT

## 2021-11-30 PROCEDURE — 80053 COMPREHEN METABOLIC PANEL: CPT

## 2021-11-30 PROCEDURE — 81001 URINALYSIS AUTO W/SCOPE: CPT

## 2021-11-30 PROCEDURE — 84443 ASSAY THYROID STIM HORMONE: CPT

## 2021-12-01 ENCOUNTER — TELEPHONE (OUTPATIENT)
Dept: NEPHROLOGY | Facility: CLINIC | Age: 81
End: 2021-12-01

## 2021-12-05 DIAGNOSIS — I10 BENIGN ESSENTIAL HTN: ICD-10-CM

## 2021-12-06 RX ORDER — SPIRONOLACTONE 25 MG/1
12.5 TABLET ORAL DAILY
Qty: 45 TABLET | Refills: 0 | Status: SHIPPED | OUTPATIENT
Start: 2021-12-06 | End: 2022-02-24 | Stop reason: SDUPTHER

## 2021-12-07 ENCOUNTER — HOSPITAL ENCOUNTER (OUTPATIENT)
Dept: NON INVASIVE DIAGNOSTICS | Facility: CLINIC | Age: 81
Discharge: HOME | End: 2021-12-07
Payer: MEDICARE

## 2021-12-07 VITALS
WEIGHT: 171 LBS | HEIGHT: 61 IN | SYSTOLIC BLOOD PRESSURE: 142 MMHG | DIASTOLIC BLOOD PRESSURE: 82 MMHG | BODY MASS INDEX: 32.28 KG/M2 | HEART RATE: 75 BPM

## 2021-12-07 DIAGNOSIS — R60.0 BILATERAL LEG EDEMA: ICD-10-CM

## 2021-12-07 DIAGNOSIS — I34.0 MODERATE TO SEVERE MITRAL REGURGITATION: ICD-10-CM

## 2021-12-07 DIAGNOSIS — I49.1 PAC (PREMATURE ATRIAL CONTRACTION): ICD-10-CM

## 2021-12-07 LAB
AORTIC ROOT: 2.5 CM
AORTIC VALVE MEAN VELOCITY: 15.7 M/S
APICAL FOUR CHAMBER EJECTION FRACTION: 80 %
AV AREA BY CONTINUOUS VTI: 1.6 CM2
AV AREA PEAK VELOCITY: 1.4 CM2
AV LVOT MEAN GRADIENT: 4 MMHG
AV LVOT PEAK GRADIENT: 5 MMHG
AV MEAN GRADIENT: 11 MMHG
AV PEAK GRADIENT: 16 MMHG
AV VALVE AREA: 1.65 CM2
AVA (PLAN): 1.4 CM2
DOP CALC AO VTI: 54.94 CM
DOP CALC LVOT AREA: 2.54 CM2
DOP CALC LVOT DIAMETER: 1.8 CM
DOP CALC LVOT PEAK VEL VTI: 35.56 CM
DOP CALC LVOT PEAK VEL: 1.13 M/S
DOP CALC LVOT STROKE INDEX: 48.6 ML/M2
DOP CALC LVOT STROKE VOLUME: 90.44 CM3
E WAVE DECELERATION TIME: 203 MS
FRACTIONAL SHORTENING: 43 % (ref 28–44)
INTERVENTRICULAR SEPTUM IN DIASTOLE (PARASTERNAL SHORT AXIS VIEW): 1 CM
LEFT ATRIUM AREA SYSTOLE SINGLE PLANE A4C: 31.4 CM2
LEFT INTERNAL DIMENSION IN SYSTOLE: 2.8 CM (ref 2.1–4)
LEFT VENTRICULAR INTERNAL DIMENSION IN DIASTOLE: 4.9 CM (ref 4.24–6.32)
LEFT VENTRICULAR POSTERIOR WALL IN END DIASTOLE: 1.1 CM
LEFT VENTRICULAR STROKE VOLUME: 82 ML
MV E'TISSUE VEL-SEP: 5 CM/S
MV PEAK A VEL: 1.14 M/S
MV PEAK E VEL: 153 CM/S
MV STENOSIS PRESSURE HALF TIME: 0 MS
PA SYSTOLIC PRESSURE: 50 MMHG
RIGHT ATRIUM AREA SYSTOLE A4C: 17 CM2
RIGHT VENTRICLE ID DIMENSION: 3.2 CM
SL CV LV EF: 70
SL CV PED ECHO LEFT VENTRICLE DIASTOLIC VOLUME (MOD BIPLANE) 2D: 112 ML
SL CV PED ECHO LEFT VENTRICLE SYSTOLIC VOLUME (MOD BIPLANE) 2D: 30 ML
TRICUSPID VALVE PEAK REGURGITATION VELOCITY: 3.49 M/S
TRICUSPID VALVE S': 1.1 CM/S
TV PEAK GRADIENT: 49 MMHG
Z-SCORE OF LEFT VENTRICULAR DIMENSION IN END SYSTOLE: -0.55

## 2021-12-07 PROCEDURE — 93306 TTE W/DOPPLER COMPLETE: CPT | Performed by: INTERNAL MEDICINE

## 2021-12-07 PROCEDURE — 93306 TTE W/DOPPLER COMPLETE: CPT

## 2021-12-08 ENCOUNTER — TELEPHONE (OUTPATIENT)
Dept: CARDIOLOGY CLINIC | Facility: CLINIC | Age: 81
End: 2021-12-08

## 2021-12-08 NOTE — TELEPHONE ENCOUNTER
Patient called and would like to know her echo results  She has an upcoming appt in Jan but would like to know the results sooner

## 2021-12-09 ENCOUNTER — OFFICE VISIT (OUTPATIENT)
Dept: FAMILY MEDICINE CLINIC | Facility: CLINIC | Age: 81
End: 2021-12-09
Payer: MEDICARE

## 2021-12-09 VITALS
DIASTOLIC BLOOD PRESSURE: 76 MMHG | SYSTOLIC BLOOD PRESSURE: 132 MMHG | WEIGHT: 170.6 LBS | RESPIRATION RATE: 17 BRPM | HEART RATE: 77 BPM | OXYGEN SATURATION: 97 % | HEIGHT: 61 IN | BODY MASS INDEX: 32.21 KG/M2

## 2021-12-09 DIAGNOSIS — I10 BENIGN ESSENTIAL HYPERTENSION: ICD-10-CM

## 2021-12-09 DIAGNOSIS — K21.9 GASTROESOPHAGEAL REFLUX DISEASE WITHOUT ESOPHAGITIS: ICD-10-CM

## 2021-12-09 DIAGNOSIS — E03.9 HYPOTHYROIDISM, UNSPECIFIED TYPE: Chronic | ICD-10-CM

## 2021-12-09 DIAGNOSIS — R73.03 PREDIABETES: ICD-10-CM

## 2021-12-09 DIAGNOSIS — E78.5 HYPERLIPIDEMIA, UNSPECIFIED HYPERLIPIDEMIA TYPE: ICD-10-CM

## 2021-12-09 DIAGNOSIS — R45.89 DEPRESSED MOOD: ICD-10-CM

## 2021-12-09 DIAGNOSIS — M54.32 SCIATICA OF LEFT SIDE: Primary | ICD-10-CM

## 2021-12-09 PROCEDURE — 99214 OFFICE O/P EST MOD 30 MIN: CPT | Performed by: FAMILY MEDICINE

## 2021-12-09 RX ORDER — AMLODIPINE BESYLATE 10 MG/1
10 TABLET ORAL DAILY
Qty: 90 TABLET | Refills: 1 | Status: SHIPPED | OUTPATIENT
Start: 2021-12-09 | End: 2022-06-20 | Stop reason: SDUPTHER

## 2021-12-09 RX ORDER — ATORVASTATIN CALCIUM 40 MG/1
40 TABLET, FILM COATED ORAL EVERY EVENING
Qty: 90 TABLET | Refills: 1 | Status: SHIPPED | OUTPATIENT
Start: 2021-12-09 | End: 2022-07-01 | Stop reason: SDUPTHER

## 2021-12-09 RX ORDER — GABAPENTIN 100 MG/1
100 CAPSULE ORAL
Qty: 90 CAPSULE | Refills: 0
Start: 2021-12-09 | End: 2022-02-03 | Stop reason: ALTCHOICE

## 2021-12-09 RX ORDER — OMEPRAZOLE 20 MG/1
20 CAPSULE, DELAYED RELEASE ORAL EVERY MORNING
Qty: 90 CAPSULE | Refills: 1 | Status: SHIPPED | OUTPATIENT
Start: 2021-12-09 | End: 2022-05-27

## 2021-12-09 RX ORDER — LEVOTHYROXINE SODIUM 88 UG/1
88 TABLET ORAL DAILY
Qty: 90 TABLET | Refills: 1 | Status: SHIPPED | OUTPATIENT
Start: 2021-12-09 | End: 2022-02-20 | Stop reason: SDUPTHER

## 2022-01-04 NOTE — TELEPHONE ENCOUNTER
Patient reports that she feels the same  She states that occasionally she gets a little pinch but it goes away quickly     Has appt 1/20/22

## 2022-01-06 ENCOUNTER — TELEPHONE (OUTPATIENT)
Dept: PAIN MEDICINE | Facility: CLINIC | Age: 82
End: 2022-01-06

## 2022-01-06 DIAGNOSIS — M51.16 INTERVERTEBRAL DISC DISORDER WITH RADICULOPATHY OF LUMBAR REGION: Primary | ICD-10-CM

## 2022-01-06 NOTE — TELEPHONE ENCOUNTER
S/w pt, she had a L L4-5 TFESI on 6/15/21 which helped 90% and is continuing to help  Pt said the pain is now the same, but on the R side  Pt said the pain starts in her low back, goes down into her buttocks, thigh and her foot is numb sometimes  Pt wants to know if she can have the injection on the R side?

## 2022-01-06 NOTE — TELEPHONE ENCOUNTER
Pt called had a procedure on her left side  Pt is having the same kind of pain on her right side now  Pt states that maybe she needs another procedure   Pain level is a 8/10    Please advise # [phone]

## 2022-01-12 DIAGNOSIS — I10 BENIGN ESSENTIAL HTN: ICD-10-CM

## 2022-01-12 DIAGNOSIS — I50.33 ACUTE ON CHRONIC DIASTOLIC CONGESTIVE HEART FAILURE (HCC): ICD-10-CM

## 2022-01-13 RX ORDER — LOSARTAN POTASSIUM 25 MG/1
TABLET ORAL
Qty: 270 TABLET | Refills: 3 | Status: SHIPPED | OUTPATIENT
Start: 2022-01-13

## 2022-01-13 RX ORDER — BUMETANIDE 2 MG/1
2 TABLET ORAL DAILY
Qty: 135 TABLET | Refills: 3 | Status: SHIPPED | OUTPATIENT
Start: 2022-01-13

## 2022-01-13 NOTE — TELEPHONE ENCOUNTER
Hello    Can pt have BMP this or next week  Is on ARB and aldactone   Want to follow up K    Thanks    np

## 2022-01-14 ENCOUNTER — LAB (OUTPATIENT)
Dept: LAB | Facility: CLINIC | Age: 82
End: 2022-01-14
Payer: MEDICARE

## 2022-01-14 ENCOUNTER — TELEPHONE (OUTPATIENT)
Dept: NEPHROLOGY | Facility: CLINIC | Age: 82
End: 2022-01-14

## 2022-01-14 DIAGNOSIS — I50.33 ACUTE ON CHRONIC DIASTOLIC CONGESTIVE HEART FAILURE (HCC): ICD-10-CM

## 2022-01-14 DIAGNOSIS — I10 BENIGN ESSENTIAL HTN: ICD-10-CM

## 2022-01-14 DIAGNOSIS — N18.31 STAGE 3A CHRONIC KIDNEY DISEASE (HCC): Primary | ICD-10-CM

## 2022-01-14 LAB
ANION GAP SERPL CALCULATED.3IONS-SCNC: 5 MMOL/L (ref 4–13)
BUN SERPL-MCNC: 34 MG/DL (ref 5–25)
CALCIUM SERPL-MCNC: 10.2 MG/DL (ref 8.3–10.1)
CHLORIDE SERPL-SCNC: 103 MMOL/L (ref 100–108)
CO2 SERPL-SCNC: 32 MMOL/L (ref 21–32)
CREAT SERPL-MCNC: 1.15 MG/DL (ref 0.6–1.3)
GFR SERPL CREATININE-BSD FRML MDRD: 44 ML/MIN/1.73SQ M
GLUCOSE SERPL-MCNC: 91 MG/DL (ref 65–140)
POTASSIUM SERPL-SCNC: 4.5 MMOL/L (ref 3.5–5.3)
SODIUM SERPL-SCNC: 140 MMOL/L (ref 136–145)

## 2022-01-14 PROCEDURE — 36415 COLL VENOUS BLD VENIPUNCTURE: CPT

## 2022-01-14 PROCEDURE — 80048 BASIC METABOLIC PNL TOTAL CA: CPT

## 2022-01-14 NOTE — RESULT ENCOUNTER NOTE
Hello    Patient normally is followed up by Ms Rain Troncoso  Please let pt know that creat stable, K stable   Franco borderline above goal 10 2    - could be effect of spironolactone  - no changes for now  - repeat BMP, PTH, Vit D 25 level in one month    Thank you    np

## 2022-01-14 NOTE — TELEPHONE ENCOUNTER
----- Message from Fabienne Homans, MD sent at 1/14/2022  2:47 PM EST -----  Hello    Patient normally is followed up by Ms Aditya Maxwell  Please let pt know that creat stable, K stable   Franco borderline above goal 10 2    - could be effect of spironolactone  - no changes for now  - repeat BMP, PTH, Vit D 25 level in one month    Thank you    np

## 2022-01-21 ENCOUNTER — OFFICE VISIT (OUTPATIENT)
Dept: CARDIOLOGY CLINIC | Facility: CLINIC | Age: 82
End: 2022-01-21
Payer: MEDICARE

## 2022-01-21 VITALS
HEART RATE: 72 BPM | DIASTOLIC BLOOD PRESSURE: 60 MMHG | TEMPERATURE: 97.6 F | BODY MASS INDEX: 31.19 KG/M2 | OXYGEN SATURATION: 98 % | SYSTOLIC BLOOD PRESSURE: 142 MMHG | HEIGHT: 61 IN | WEIGHT: 165.2 LBS

## 2022-01-21 DIAGNOSIS — I10 BENIGN ESSENTIAL HYPERTENSION: ICD-10-CM

## 2022-01-21 DIAGNOSIS — I50.32 CHRONIC DIASTOLIC (CONGESTIVE) HEART FAILURE (HCC): Primary | ICD-10-CM

## 2022-01-21 PROCEDURE — 93000 ELECTROCARDIOGRAM COMPLETE: CPT | Performed by: INTERNAL MEDICINE

## 2022-01-21 PROCEDURE — 99214 OFFICE O/P EST MOD 30 MIN: CPT | Performed by: INTERNAL MEDICINE

## 2022-01-21 NOTE — PROGRESS NOTES
Cardiology Follow Up  [facility]  1940  [id number]  [place] AND [facility]  [facility]  [address] [address] [address]    1  Chronic diastolic (congestive) heart failure (HCC)  POCT ECG   2  Benign essential hypertension  POCT ECG      Discussion/Plan: Moderate to severe mitral regurgitation- blood pressure optimized  Continue amlodipine 10 mg  Losartan 50 mg morning and 50mgh in evening  Bumex 2 mg daily  We discussed about tight blood pressure control  She will continue with a low-salt diet  Her blood pressures have been optimized    Frequent PAC/irregular heart rhythm- Her heart monitor has been negative for atrial fibrillation  Heart rates have been well controlled  We will continue to monitor  Intraparenchymal bleed/cranioplasty- healed well    Acute on chronic diastolic heart failure with a component of lymphedema- improved volume status  Lower ext better  - Weight loss  - Compression socks + wedge pillow  - Increase bumex 2mg daily +  - Low-salt diet   - Elevation of legs + walking  -- if with continued lower extremity edema consideration for compression boots    Chronic dyspnea-I believe this is multifactorial including chronic deconditioning from severe back pain  BMI of 41  Some retained lower extremity edema  Her PFTs were reviewed which also show some restriction  She needs to started structured exercise program   Possible swimming or water sports given her severe back discomfort     Sleep apnea  -- cpap    Incomplete rbbb    Acute on chronic kidney disease follow-up with renal  - continue cozaar +  - Bumex 2mg daily    Triglycerides- controlled  - 4000 mg omega-3 daily    WENDI- on cpap      Interval History:  She denies having chest pain  Her edema is better  Taking omega 3 currently  No bleeding or bruising  No dizziness or light-headness  Blood pressure very well controlled   Has back pain     12/11/2018:  She reports having some exertional shortness of breath  Her lower extremity edema has improved but is persistent  She denies feeling dizziness or lightheadedness  She denies having any falls  She denies having any bleeding or bruising     06/12/2019:  She reports some improvement in her shortness of breath but still has dyspnea while sitting  She is unable to walk secondary to severe back pain  She denies feeling dizziness or lightheadedness  Her lower extremity edema has improved  She is trying a elevator legs  She does wear compression socks  She is compliant with CPAP     31/20: [de-identified]year-old with recent intraparenchymal hemorrhage status post right decompressive craniectomy and evacuation of intraparenchymal hematoma  She is pending follow-up bone flap by neuro surgery  She tolerated her previous procedure without evidence of decompensated heart failure  She was noted to have some bradycardia and her beta-blocker was discontinued  She is compliant with her antihypertensive medications  She is compliant with her diuretics  She denies having significant worsening in her chronic dyspnea or lower extremity edema    02/24/2021:  She denies currently feeling significant shortness of breath  She reports her lower extremity swelling is well controlled  We reviewed through her recent EKG  No prior history of atrial fibrillation  She is compliant with her medications  Denies having any falls  06/07/2021:  Her weight has been stable  She denies having major lower extremity swelling  We reviewed through her last echocardiogram   Reviewed through her Holter monitor which did not show evidence of atrial fibrillation or atrial flutter  She is compliant with her CPAP  She is compliant with her diuretics  Reviewed her last lab work  01/21/2022:  Her blood pressures have been controlled  She denies having major shortness of breath  She denies having chest heaviness    We reviewed through her recent echocardiogram   No major change  Patient Active Problem List   Diagnosis    Benign essential hypertension    Chronic diastolic (congestive) heart failure (HCC)    Hypothyroidism    Arthropathy of knee    Hallux abductovalgus with bunions, unspecified laterality    CKD (chronic kidney disease), stage III ([place] [address] )    Diverticulitis of colon    Chronic gout without tophus    Hiatal hernia    Hyperlipemia    Hyperuricemia    Morbid obesity with body mass index of 40 0-44 9 in adult ([place] [address] )    Nephrolithiasis    WENDI (obstructive sleep apnea)    Pseudogout of left wrist    Gastroesophageal reflux disease without esophagitis    Exertional shortness of breath    Medicare annual wellness visit, subsequent    Intraparenchymal hemorrhage of brain ([place] [address] )    Prediabetes    Status post right frontotemporal replacement cranioplasty    BMI 38 0-38 9,adult    Sciatica of left side    Spinal stenosis of lumbar region with neurogenic claudication    Depressed mood     Past Medical History:   Diagnosis Date    Anxiety     Arthritis     joints    Cataract     Disease of thyroid gland     Eczema     GERD (gastroesophageal reflux disease)     Gout     Heart failure ([place] [address] )     Hepatitis     Hiatal hernia     Hypertension     Onychomycosis     last assessed: 4/29/16    Orthopnea     resolved: 1/8/16    Pseudogout of left wrist     Sleep apnea     wears CPAP    Stroke [facility])      Social History     Socioeconomic History    Marital status:      Spouse name: Not on file    Number of children: Not on file    Years of education: Not on file    Highest education level: Not on file   Occupational History    Not on file   Tobacco Use    Smoking status: Never Smoker    Smokeless tobacco: Never Used   Vaping Use    Vaping Use: Never used   Substance and Sexual Activity    Alcohol use: Yes     Alcohol/week: 0 0 standard drinks     Comment: Rare      Drug use: Never    Sexual activity: Not Currently     Birth control/protection: Post-menopausal   Other Topics Concern    Not on file   Social History Narrative    Daily coffee consumption    Lack of exercise    Sleeps 6-7 hours a day     Social Determinants of Health     Financial Resource Strain: Not on file   Food Insecurity: Not on file   Transportation Needs: Not on file   Physical Activity: Not on file   Stress: Not on file   Social Connections: Not on file   Intimate Partner Violence: Not on file   Housing Stability: Not on file      Family History   Problem Relation Age of Onset    Diabetes Mother     Coronary artery disease Mother     Arthritis Mother     Hypertension Mother     Hypertension Father     Diabetes Brother     Diabetes Son     Arthritis Family      Past Surgical History:   Procedure Laterality Date    ABDOMINAL SURGERY          BRAIN HEMATOMA EVACUATION Right 2020    Procedure: Right decompressive craniectomy and evacuation of intraparenchymal hematoma; Surgeon: Bárbara Berger MD;  [facility];  Service: Neurosurgery    BREAST SURGERY Right     cyst removal    CARPAL TUNNEL RELEASE Left     CARPAL TUNNEL RELEASE Right     CATARACT EXTRACTION       SECTION  1960    x1    COLONOSCOPY N/A 2018    Procedure: COLONOSCOPY;  Surgeon: Ramos Stoner MD;  Location: [place] [address] GI LAB; Service: Gastroenterology    DILATION AND CURETTAGE OF UTERUS  1967    EYE SURGERY Left 2006    cataracts    HERNIA REPAIR      umbilical hernia    INCISIONAL HERNIA REPAIR      incarcerated    KNEE ARTHROSCOPY Right     IN REPLACE SKULL PLATE/FLAP Right 3/3/9305    Procedure: right frontotemporal replacement cranioplasty;  Surgeon: Bárbara Berger MD;  [facility];  Service: Neurosurgery    IN XCAPSL CTRC RMVL INSJ IO LENS PROSTH W/O ECP Right 3/27/2017    Procedure: EXTRACTION EXTRACAPSULAR CATARACT PHACO INTRAOCULAR LENS (IOL);   Surgeon: Kenia Conner MD; Location: [place] [address] MAIN OR;  Service: Ophthalmology       Current Outpatient Medications:     amLODIPine (NORVASC) 10 mg tablet, Take 1 tablet (10 mg total) by mouth daily, Disp: 90 tablet, Rfl: 1    atorvastatin (LIPITOR) 40 mg tablet, Take 1 tablet (40 mg total) by mouth every evening, Disp: 90 tablet, Rfl: 1    bumetanide (BUMEX) 2 mg tablet, Take 1 tablet (2 mg total) by mouth daily, Disp: 135 tablet, Rfl: 3    Diclofenac Sodium (VOLTAREN) 1 %, Apply 2 g topically 4 (four) times a day, Disp: 100 g, Rfl: 1    DULoxetine (CYMBALTA) 20 mg capsule, Take 1 capsule (20 mg total) by mouth daily, Disp: 90 capsule, Rfl: 0    levothyroxine 88 mcg tablet, Take 1 tablet (88 mcg total) by mouth daily, Disp: 90 tablet, Rfl: 1    losartan (COZAAR) 25 mg tablet, Take 2 tab in AM and 1 tab in PM, Disp: 270 tablet, Rfl: 3    omeprazole (PriLOSEC) 20 mg delayed release capsule, Take 1 capsule (20 mg total) by mouth every morning, Disp: 90 capsule, Rfl: 1    spironolactone (ALDACTONE) 25 mg tablet, Take 0 5 tablets (12 5 mg total) by mouth daily, Disp: 45 tablet, Rfl: 0    gabapentin (Neurontin) 100 mg capsule, Take 1 capsule (100 mg total) by mouth daily at bedtime (Patient not taking: Reported on 1/21/2022 ), Disp: 90 capsule, Rfl: 0    tobramycin (TOBREX) 0 3 % SOLN, Administer 1 drop into the left eye 2 (two) times a day (Patient not taking: Reported on 1/21/2022 ), Disp: 5 mL, Rfl: 0  No Known Allergies    Review of Systems:  Review of Systems   Constitutional: Negative  Negative for activity change, appetite change, chills, diaphoresis, fatigue, fever and unexpected weight change  HENT: Negative  Negative for congestion, dental problem, drooling, ear discharge, ear pain, facial swelling, hearing loss, mouth sores, nosebleeds, postnasal drip, rhinorrhea, sinus pressure, sinus pain, sneezing, sore throat, tinnitus, trouble swallowing and voice change  Eyes: Negative    Negative for photophobia, pain, redness, itching and visual disturbance  Respiratory: Negative  Negative for apnea, cough, choking, chest tightness, shortness of breath, wheezing and stridor  Cardiovascular: Positive for leg swelling  Negative for chest pain and palpitations  Gastrointestinal: Negative  Negative for abdominal distention, abdominal pain, anal bleeding, blood in stool, constipation, diarrhea, nausea, rectal pain and vomiting  Endocrine: Negative  Negative for cold intolerance, heat intolerance, polydipsia, polyphagia and polyuria  Genitourinary: Negative  Negative for decreased urine volume, difficulty urinating, dyspareunia, dysuria, enuresis, flank pain, frequency, genital sores, hematuria, menstrual problem, pelvic pain, urgency, vaginal bleeding, vaginal discharge and vaginal pain  Musculoskeletal: Positive for back pain  Negative for arthralgias, gait problem, joint swelling, myalgias, neck pain and neck stiffness  Skin: Negative  Negative for color change, pallor, rash and wound  Allergic/Immunologic: Negative  Negative for environmental allergies, food allergies and immunocompromised state  Neurological: Negative  Negative for dizziness, tremors, seizures, syncope, facial asymmetry, speech difficulty, weakness, light-headedness, numbness and headaches  Hematological: Negative  Negative for adenopathy  Does not bruise/bleed easily  Psychiatric/Behavioral: Negative  Negative for agitation, behavioral problems, confusion, decreased concentration, dysphoric mood, hallucinations, self-injury, sleep disturbance and suicidal ideas  The patient is not nervous/anxious and is not hyperactive  All other systems reviewed and are negative        Vitals:    01/21/22 1257   BP: 142/60   BP Location: Right arm   Patient Position: Sitting   Cuff Size: Large   Pulse: 72   Temp: 97 6 °F (36 4 °C)   SpO2: 98%   Weight: 74 9 kg (165 lb 3 2 oz)   Height: 5' 1" (1 549 m)     Physical Exam:  Physical Exam  Vitals and nursing note reviewed  Constitutional:       General: She is not in acute distress  Appearance: She is well-developed  She is not diaphoretic  HENT:      Head: Normocephalic and atraumatic  Right Ear: External ear normal       Left Ear: External ear normal    Eyes:      General: No scleral icterus  Right eye: No discharge  Left eye: No discharge  Conjunctiva/sclera: Conjunctivae normal       Pupils: Pupils are equal, round, and reactive to light  Neck:      Thyroid: No thyromegaly  Vascular: No JVD  Trachea: No tracheal deviation  Cardiovascular:      Rate and Rhythm: Normal rate and regular rhythm  Heart sounds: Murmur heard  No friction rub  Gallop present  Pulmonary:      Effort: Pulmonary effort is normal  No respiratory distress  Breath sounds: Normal breath sounds  No stridor  No wheezing or rales  Chest:      Chest wall: No tenderness  Abdominal:      General: Bowel sounds are normal  There is no distension  Palpations: Abdomen is soft  There is no mass  Tenderness: There is no abdominal tenderness  There is no guarding or rebound  Musculoskeletal:         General: No tenderness or deformity  Normal range of motion  Cervical back: Normal range of motion and neck supple  Skin:     General: Skin is warm and dry  Coloration: Skin is not pale  Findings: No erythema or rash  Neurological:      Mental Status: She is alert and oriented to person, place, and time  Cranial Nerves: No cranial nerve deficit  Motor: No abnormal muscle tone  Coordination: Coordination normal       Deep Tendon Reflexes: Reflexes are normal and symmetric  Psychiatric:         Behavior: Behavior normal          Thought Content:  Thought content normal          Judgment: Judgment normal          Labs:     Lab Results   Component Value Date    WBC 6 01 11/30/2021    HGB 12 2 11/30/2021    HCT 38 6 11/30/2021    MCV 90 11/30/2021     2021     Lab Results   Component Value Date    K 4 5 2022     2022    CO2 32 2022    BUN 34 (H) 2022    CREATININE 1 15 2022    GLUCOSE 124 2020    GLUF 94 2021    CALCIUM 10 2 (H) 2022    CORRECTEDCA 9 8 2021    AST 14 2021    ALT 21 2021    ALKPHOS 111 2021    EGFR 44 2022     Lab Results   Component Value Date    CHOL 191 2013    CHOL 218 2013     Lab Results   Component Value Date    HDL 62 2021    HDL 59 2021    HDL 54 2020     Lab Results   Component Value Date    LDLCALC 47 2021    LDLCALC 37 2021    LDLCALC 45 2020     Lab Results   Component Value Date    TRIG 73 2021    TRIG 105 2021    TRIG 79 2020     Lab Results   Component Value Date    HGBA1C 5 6 2021       Imaging & Testing   I have personally reviewed pertinent reports  EKG: Personally reviewed  Normal sinus rhythm no acute st/t wave    Cardiac testing:   Results for orders placed during the hospital encounter of 01/15/16   Echo complete with contrast if indicated    Narrative [facility] [address]  [address] [place] [address]  [phone]    Transthoracic Echocardiogram  2D, M-mode, Doppler, and Color Doppler    Study date:  15-Berny-2016    Patient: Partha Clark  MR number: [id number]  Account number: [de-identified]  : 1940  Age: 67 years  Gender: Female  Status: Routine  Location: Echo lab  Height: 61 in  Weight: 214 5 lb  BP: 132/ 84 mmHg    Indications: HTN    Diagnoses: 401 9 - HYPERTENSION NOS    Sonographer:  Edgardo Rivera  Primary Physician:  Kip Pereyra  Referring Physician:  Aiden Coffee:  Allie Gonzalez  Interpreting Physician:  Claudia Kc DO    SUMMARY    LEFT VENTRICLE:  Systolic function was at the lower limits of normal  Ejection fraction was  estimated in the range of 50 % to 55 %    There were no regional wall motion abnormalities  There was moderate concentric hypertrophy  Features were consistent with a pseudonormal left ventricular filling pattern,  with concomitant abnormal relaxation and increased filling pressure (grade 2  diastolic dysfunction)  Doppler parameters were consistent with elevated mean  left atrial filling pressure  LEFT ATRIUM:  The atrium was moderately dilated  RIGHT ATRIUM:  The atrium was markedly dilated  MITRAL VALVE:  There was marked annular calcification  There was moderate regurgitation  TRICUSPID VALVE:  There was mild regurgitation  Pulmonary artery systolic pressure was mildly increased  Estimated peak PA pressure was 46 mmHg  HISTORY: PRIOR HISTORY: Patient has no history of cardiovascular disease  PROCEDURE: The procedure was performed in the echo lab  This was a routine  study  The transthoracic approach was used  The study included complete 2D  imaging, M-mode, complete spectral Doppler, and color Doppler  The heart rate  was 77 bpm, at the start of the study  Echocardiographic views were limited due  to poor acoustic window availability and decreased penetration  This was a  technically difficult study  LEFT VENTRICLE: Size was normal  Systolic function was at the lower limits of  normal  Ejection fraction was estimated in the range of 50 % to 55 %  There  were no regional wall motion abnormalities  There was moderate concentric  hypertrophy  DOPPLER: Features were consistent with a pseudonormal left  ventricular filling pattern, with concomitant abnormal relaxation and increased  filling pressure (grade 2 diastolic dysfunction)  Doppler parameters were  consistent with elevated mean left atrial filling pressure  RIGHT VENTRICLE: The size was normal  Systolic function was normal  DOPPLER:  Systolic pressure was within the normal range  LEFT ATRIUM: The atrium was moderately dilated  No thrombus was identified      RIGHT ATRIUM: The atrium was markedly dilated  MITRAL VALVE: There was marked annular calcification  Valve structure was  normal  There was normal leaflet separation  No echocardiographic evidence for  prolapse  DOPPLER: The transmitral velocity was within the normal range  There  was no evidence for stenosis  There was moderate regurgitation  AORTIC VALVE: The valve was trileaflet  Leaflets exhibited normal thickness,  normal cuspal separation, and sclerosis  DOPPLER: Transaortic velocity was  within the normal range  There was no evidence for stenosis  There was no  regurgitation  TRICUSPID VALVE: The valve structure was normal  There was normal leaflet  separation  DOPPLER: The transtricuspid velocity was within the normal range  There was mild regurgitation  Pulmonary artery systolic pressure was mildly  increased  Estimated peak PA pressure was 46 mmHg  PULMONIC VALVE: Leaflets exhibited normal thickness, no calcification, and  normal cuspal separation  DOPPLER: The transpulmonic velocity was within the  normal range  There was mild regurgitation  PERICARDIUM: There was no thickening  There was no pericardial effusion  AORTA: The root exhibited normal size  PULMONARY ARTERY: The size was normal  The morphology appeared normal     SYSTEM MEASUREMENT TABLES    2D mode  AoR Diam 2D: 2 8 cm  LA Diam (2D): 5 3 cm  LA/Ao (2D): 1 89  FS (2D Teich): 25 3 %  IVSd (2D): 1 44 cm  LVDEV: 98 3 cm³  LVESV: 49 1 cm³  LVIDd(2D): 4 62 cm  LVISd (2D): 3 45 cm  LVPWd (2D): 1 3 cm  SV (Teich): 49 2 cm³    Apical four chamber  LVEF A4C: 55 %    Unspecified Scan Mode  MV Peak A Jesse: 1110 mm/s  MV Peak E Jesse   Mean: 1400 mm/s  MVA (PHT): 3 55 cm squared  PHT: 58 ms  Max P mm[Hg]  V Max: 2990 mm/s  Vmax: 3050 mm/s  RA Area: 19 6 cm squared  RA Volume: 55 3 cm³  TAPSE: 1 9 cm    [employer] Accredited Echocardiography Laboratory    Prepared and electronically signed by    Marisol Brown DO  Signed 2016 17:37:47       EKG sinus rhythm with frequent apc qtc [address]  Please call with any questions or suggestions    A description of the counseling:   Goals and Barriers:  Patient's ability to self care:  Medication side effect reviewed with patient in detail and all their questions answered  "This note has been constructed using a voice recognition system  Therefore there may be syntax, spelling, and/or grammatical errors   Please call if you have any questions  "

## 2022-02-03 ENCOUNTER — HOSPITAL ENCOUNTER (OUTPATIENT)
Dept: RADIOLOGY | Facility: CLINIC | Age: 82
Discharge: HOME | End: 2022-02-03
Attending: ANESTHESIOLOGY | Admitting: ANESTHESIOLOGY
Payer: MEDICARE

## 2022-02-03 VITALS
SYSTOLIC BLOOD PRESSURE: 132 MMHG | RESPIRATION RATE: 20 BRPM | OXYGEN SATURATION: 97 % | HEART RATE: 69 BPM | TEMPERATURE: 97.4 F | DIASTOLIC BLOOD PRESSURE: 68 MMHG

## 2022-02-03 DIAGNOSIS — M51.16 INTERVERTEBRAL DISC DISORDER WITH RADICULOPATHY OF LUMBAR REGION: ICD-10-CM

## 2022-02-03 PROCEDURE — 64484 NJX AA&/STRD TFRM EPI L/S EA: CPT | Performed by: ANESTHESIOLOGY

## 2022-02-03 PROCEDURE — 64483 NJX AA&/STRD TFRM EPI L/S 1: CPT | Performed by: ANESTHESIOLOGY

## 2022-02-03 RX ORDER — METHYLPREDNISOLONE ACETATE 80 MG/ML
80 INJECTION, SUSPENSION INTRA-ARTICULAR; INTRALESIONAL; INTRAMUSCULAR; PARENTERAL; SOFT TISSUE ONCE
Status: COMPLETED | OUTPATIENT
Start: 2022-02-03 | End: 2022-02-03

## 2022-02-03 RX ORDER — BUPIVACAINE HCL/PF 2.5 MG/ML
2 VIAL (ML) INJECTION ONCE
Status: COMPLETED | OUTPATIENT
Start: 2022-02-03 | End: 2022-02-03

## 2022-02-03 RX ORDER — 0.9 % SODIUM CHLORIDE 0.9 %
4 VIAL (ML) INJECTION ONCE
Status: COMPLETED | OUTPATIENT
Start: 2022-02-03 | End: 2022-02-03

## 2022-02-03 RX ADMIN — BUPIVACAINE HYDROCHLORIDE 2 ML: 2.5 INJECTION, SOLUTION EPIDURAL; INFILTRATION; INTRACAUDAL at 14:55

## 2022-02-03 RX ADMIN — Medication 4 ML: at 14:53

## 2022-02-03 RX ADMIN — METHYLPREDNISOLONE ACETATE 80 MG: 80 INJECTION, SUSPENSION INTRA-ARTICULAR; INTRALESIONAL; INTRAMUSCULAR; PARENTERAL; SOFT TISSUE at 14:55

## 2022-02-03 RX ADMIN — IOHEXOL 1 ML: 300 INJECTION, SOLUTION INTRAVENOUS at 14:55

## 2022-02-03 NOTE — DISCHARGE INSTR - LAB
Epidural Steroid Injection   WHAT YOU NEED TO KNOW:   An epidural steroid injection (BRET) is a procedure to inject steroid medicine into the epidural space  The epidural space is between your spinal cord and vertebrae  Steroids reduce inflammation and fluid buildup in your spine that may be causing pain  You may be given pain medicine along with the steroids  ACTIVITY  Do not drive or operate machinery today  No strenuous activity today - bending, lifting, etc   You may resume normal activites starting tomorrow - start slowly and as tolerated  You may shower today, but no tub baths or hot tubs  You may have numbness for several hours from the local anesthetic  Please use caution and common sense, especially with weight-bearing activities  CARE OF THE INJECTION SITE  If you have soreness or pain, apply ice to the area today (20 minutes on/20 minutes off)  Starting tomorrow, you may use warm, moist heat or ice if needed  You may have an increase or change in your discomfort for 36-48 hours after your treatment  Apply ice and continue with any pain medication you have been prescribed  Notify the [facility] if you have any of the following: redness, drainage, swelling, headache, stiff neck or fever above 100°F     SPECIAL INSTRUCTIONS  Our office will contact you in approximately 7 days for a progress report  MEDICATIONS  Continue to take all routine medications  Our office may have instructed you to hold some medications  As no general anesthesia was used in today's procedure, you should not experience any side effects related to anesthesia  If you have a problem specifically related to your procedure, please call our office at [phone]  Problems not related to your procedure should be directed to your primary care physician

## 2022-02-03 NOTE — H&P
History of Present Illness: The patient is a 79 y o  female who presents with complaints of right lower back and leg pain secondary to stenosis and is here today for right L4-5 transforaminal epidural steroid injection  Patient Active Problem List   Diagnosis    Benign essential hypertension    Chronic diastolic (congestive) heart failure (HCC)    Hypothyroidism    Arthropathy of knee    Hallux abductovalgus with bunions, unspecified laterality    CKD (chronic kidney disease), stage III ([place] [address] )    Diverticulitis of colon    Chronic gout without tophus    Hiatal hernia    Hyperlipemia    Hyperuricemia    Morbid obesity with body mass index of 40 0-44 9 in adult ([place] [address] )    Nephrolithiasis    WENDI (obstructive sleep apnea)    Pseudogout of left wrist    Gastroesophageal reflux disease without esophagitis    Exertional shortness of breath    Medicare annual wellness visit, subsequent    Intraparenchymal hemorrhage of brain ([place] [address] )    Prediabetes    Status post right frontotemporal replacement cranioplasty    BMI 38 0-38 9,adult    Sciatica of left side    Spinal stenosis of lumbar region with neurogenic claudication    Depressed mood       Past Medical History:   Diagnosis Date    Anxiety     Arthritis     joints    Cataract     Disease of thyroid gland     Eczema     GERD (gastroesophageal reflux disease)     Gout     Heart failure ([facility] )     Hepatitis     Hiatal hernia     Hypertension     Onychomycosis     last assessed: 16    Orthopnea     resolved: 16    Pseudogout of left wrist     Sleep apnea     wears CPAP    Stroke [facility])        Past Surgical History:   Procedure Laterality Date    ABDOMINAL SURGERY          BRAIN HEMATOMA EVACUATION Right 2020    Procedure: Right decompressive craniectomy and evacuation of intraparenchymal hematoma;   Surgeon: Oxana Parsons MD;  [facility];  Service: Neurosurgery    BREAST SURGERY Right     cyst removal  CARPAL TUNNEL RELEASE Left     CARPAL TUNNEL RELEASE Right     CATARACT EXTRACTION       SECTION  1960    x1    COLONOSCOPY N/A 2018    Procedure: COLONOSCOPY;  Surgeon: Ronaldo Estrada MD;  Location: [facility] GI LAB; Service: Gastroenterology    DILATION AND CURETTAGE OF UTERUS  1967    EYE SURGERY Left 2006    cataracts    HERNIA REPAIR      umbilical hernia    INCISIONAL HERNIA REPAIR      incarcerated    KNEE ARTHROSCOPY Right     OK REPLACE SKULL PLATE/FLAP Right 4052    Procedure: right frontotemporal replacement cranioplasty;  Surgeon: Epifanio Pinzon MD;  Location:  MAIN OR;  Service: Neurosurgery    OK XCAPSL CTRC RMVL INSJ IO LENS PROSTH W/O ECP Right 3/27/2017    Procedure: EXTRACTION EXTRACAPSULAR CATARACT PHACO INTRAOCULAR LENS (IOL);   Surgeon: Mariel Briceno MD;  Location: [facility] MAIN OR;  Service: Ophthalmology         Current Outpatient Medications:     amLODIPine (NORVASC) 10 mg tablet, Take 1 tablet (10 mg total) by mouth daily, Disp: 90 tablet, Rfl: 1    atorvastatin (LIPITOR) 40 mg tablet, Take 1 tablet (40 mg total) by mouth every evening, Disp: 90 tablet, Rfl: 1    bumetanide (BUMEX) 2 mg tablet, Take 1 tablet (2 mg total) by mouth daily, Disp: 135 tablet, Rfl: 3    Diclofenac Sodium (VOLTAREN) 1 %, Apply 2 g topically 4 (four) times a day, Disp: 100 g, Rfl: 1    DULoxetine (CYMBALTA) 20 mg capsule, Take 1 capsule (20 mg total) by mouth daily, Disp: 90 capsule, Rfl: 0    levothyroxine 88 mcg tablet, Take 1 tablet (88 mcg total) by mouth daily, Disp: 90 tablet, Rfl: 1    losartan (COZAAR) 25 mg tablet, Take 2 tab in AM and 1 tab in PM, Disp: 270 tablet, Rfl: 3    omeprazole (PriLOSEC) 20 mg delayed release capsule, Take 1 capsule (20 mg total) by mouth every morning, Disp: 90 capsule, Rfl: 1    spironolactone (ALDACTONE) 25 mg tablet, Take 0 5 tablets (12 5 mg total) by mouth daily, Disp: 45 tablet, Rfl: 0    Current Facility-Administered Medications:     bupivacaine (PF) (MARCAINE) 0 25 % injection 2 mL, 2 mL, Epidural, Once, Rosanna Melendez MD    iohexol (OMNIPAQUE) 300 mg/mL injection 1 mL, 1 mL, Epidural, Once, Rosanna Melendez MD    lidocaine (PF) (XYLOCAINE-MPF) 2 % injection 4 mL, 4 mL, Infiltration, Once, Rosanna Melendez MD    methylPREDNISolone acetate (DEPO-MEDROL) injection 80 mg, 80 mg, Epidural, Once, Rosanna Melendez MD    sodium chloride (PF) 0 9 % injection 4 mL, 4 mL, Infiltration, Once, Rosanna Melendez MD    No Known Allergies    Physical Exam:   Vitals:    02/03/22 1431   BP: 146/51   Pulse: 73   Resp: 20   Temp: (!) 97 4 °F (36 3 °C)   SpO2: 98%     General: Awake, Alert, Oriented x 3, Mood and affect appropriate  Respiratory: Respirations even and unlabored  Cardiovascular: Peripheral pulses intact; no edema  Musculoskeletal Exam:  Right lower back pain    ASA Score: 3    Patient/Chart Verification  Patient ID Verified: Verbal  Consents Confirmed: To be obtained in the Pre-Procedure area  Interval H&P(within 24 hr) Complete (required for Outpatients and Surgery Admit only): To be obtained in the Pre-Procedure area  Allergies Reviewed: Yes  Anticoag/NSAID held?: NA  Currently on antibiotics?: No    Assessment:   1   Intervertebral disc disorder with radiculopathy of lumbar region        Plan: Right L4-5 TF BRET

## 2022-02-10 ENCOUNTER — TELEPHONE (OUTPATIENT)
Dept: PAIN MEDICINE | Facility: CLINIC | Age: 82
End: 2022-02-10

## 2022-02-20 DIAGNOSIS — E03.9 HYPOTHYROIDISM, UNSPECIFIED TYPE: Chronic | ICD-10-CM

## 2022-02-21 RX ORDER — LEVOTHYROXINE SODIUM 88 UG/1
88 TABLET ORAL DAILY
Qty: 90 TABLET | Refills: 0 | Status: SHIPPED | OUTPATIENT
Start: 2022-02-21 | End: 2022-07-01 | Stop reason: SDUPTHER

## 2022-02-24 DIAGNOSIS — I10 BENIGN ESSENTIAL HTN: ICD-10-CM

## 2022-02-25 RX ORDER — SPIRONOLACTONE 25 MG/1
12.5 TABLET ORAL DAILY
Qty: 45 TABLET | Refills: 3 | Status: SHIPPED | OUTPATIENT
Start: 2022-02-25 | End: 2022-02-27 | Stop reason: SDUPTHER

## 2022-02-27 DIAGNOSIS — R45.89 DEPRESSED MOOD: ICD-10-CM

## 2022-02-28 RX ORDER — DULOXETIN HYDROCHLORIDE 20 MG/1
20 CAPSULE, DELAYED RELEASE ORAL DAILY
Qty: 90 CAPSULE | Refills: 0 | Status: SHIPPED | OUTPATIENT
Start: 2022-02-28 | End: 2022-07-01

## 2022-03-02 ENCOUNTER — APPOINTMENT (OUTPATIENT)
Dept: LAB | Facility: CLINIC | Age: 82
End: 2022-03-02
Payer: MEDICARE

## 2022-03-02 DIAGNOSIS — N18.31 STAGE 3A CHRONIC KIDNEY DISEASE (HCC): ICD-10-CM

## 2022-03-02 DIAGNOSIS — I50.33 ACUTE ON CHRONIC DIASTOLIC CONGESTIVE HEART FAILURE (HCC): ICD-10-CM

## 2022-03-02 LAB
25(OH)D3 SERPL-MCNC: 40.8 NG/ML (ref 30–100)
ANION GAP SERPL CALCULATED.3IONS-SCNC: 4 MMOL/L (ref 4–13)
BUN SERPL-MCNC: 31 MG/DL (ref 5–25)
CALCIUM SERPL-MCNC: 9.6 MG/DL (ref 8.3–10.1)
CHLORIDE SERPL-SCNC: 106 MMOL/L (ref 100–108)
CO2 SERPL-SCNC: 31 MMOL/L (ref 21–32)
CREAT SERPL-MCNC: 1.03 MG/DL (ref 0.6–1.3)
GFR SERPL CREATININE-BSD FRML MDRD: 50 ML/MIN/1.73SQ M
GLUCOSE P FAST SERPL-MCNC: 82 MG/DL (ref 65–99)
POTASSIUM SERPL-SCNC: 4 MMOL/L (ref 3.5–5.3)
PTH-INTACT SERPL-MCNC: 88 PG/ML (ref 18.4–80.1)
SODIUM SERPL-SCNC: 141 MMOL/L (ref 136–145)

## 2022-03-02 PROCEDURE — 83970 ASSAY OF PARATHORMONE: CPT

## 2022-03-02 PROCEDURE — 82306 VITAMIN D 25 HYDROXY: CPT

## 2022-03-02 PROCEDURE — 36415 COLL VENOUS BLD VENIPUNCTURE: CPT

## 2022-03-02 PROCEDURE — 80048 BASIC METABOLIC PNL TOTAL CA: CPT

## 2022-03-07 ENCOUNTER — OFFICE VISIT (OUTPATIENT)
Dept: NEPHROLOGY | Facility: CLINIC | Age: 82
End: 2022-03-07
Payer: MEDICARE

## 2022-03-07 ENCOUNTER — TELEPHONE (OUTPATIENT)
Dept: FAMILY MEDICINE CLINIC | Facility: CLINIC | Age: 82
End: 2022-03-07

## 2022-03-07 VITALS
SYSTOLIC BLOOD PRESSURE: 138 MMHG | DIASTOLIC BLOOD PRESSURE: 56 MMHG | WEIGHT: 164 LBS | HEIGHT: 61 IN | BODY MASS INDEX: 30.96 KG/M2 | HEART RATE: 78 BPM

## 2022-03-07 DIAGNOSIS — E66.01 MORBID OBESITY DUE TO EXCESS CALORIES (HCC): ICD-10-CM

## 2022-03-07 DIAGNOSIS — N18.31 STAGE 3A CHRONIC KIDNEY DISEASE (HCC): Primary | ICD-10-CM

## 2022-03-07 DIAGNOSIS — G47.33 OSA (OBSTRUCTIVE SLEEP APNEA): Primary | ICD-10-CM

## 2022-03-07 PROCEDURE — 99213 OFFICE O/P EST LOW 20 MIN: CPT | Performed by: INTERNAL MEDICINE

## 2022-03-07 NOTE — TELEPHONE ENCOUNTER
----- Message from Joel Marr MD sent at 3/7/2022 12:48 PM EST -----  Please reach out to patient re her CPAP face mask  She will be calling [employer] to get a new mask fitted to her face  let me know If I need to put in an order for it    ----- Message -----  From: Vinnie Welch MD  Sent: 3/7/2022  11:11 AM EST  To: MD Tommy Castro    Hope you are well    Saw the patient today for CKD follow-up  Renal function is stable  Blood pressures are okay but at times due to get elevated above 140  Therefore I was having the patient check blood pressure log for 1 week and will make adjustments if neededI wanted to make sure you knew that she has developed a sore on her nose that appears to be healing but about 1 cm in size from her sleep apnea mask  She has lost weight and therefore the mask is not fitting her well   -would you be able to assist her in figuring out the next steps to obtain a different mask or size settings  -I offered her sleep eval with the sleep medicine team but she states that all she has to do is talk to [facility] and they will adjust?  -also with regards the sore    It is healing but I did ask her to not use the sleep apnea mask for at least a week    Thank you

## 2022-03-07 NOTE — PATIENT INSTRUCTIONS
1) Avoid NSAIDS - (Example - motrin, advil, ibuprofen, aleve, exederin, etc)  2) Always follow a low salt diet  3) No changes to your medications  4) please check you blood pressures twice daily and send in recordings in one week  5) labwork in 4 months  6) appointment in 4-5 months  7) Please talk to Dr Wanda Angulo about the sore in your nose and please work on changing your face mask for sleep apnea

## 2022-03-07 NOTE — PROGRESS NOTES
NEPHROLOGY OFFICE VISIT   Sandi Ardon 79 y o  female MRN: [id number]  3/7/2022    Reason for Visit: CKD III    ASSESSMENT and PLAN:    I had the pleasure of seeing Ms Dejon Boo today in the renal clinic for the continued management of CKD III  I had the pleasure of seeing Ms Wise today in the renal clinic for the continued management of CKD III      2/2020 - February with 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 on February 11th   Completed course of Keppra for seizure prophylaxis      3/30/2020 -Cass Trip was increased to 50 mg twice a day from 50 in the morning and 25 in the evening due to high blood pressures     4/3/2020 - spironolactone 12 5 mg daily was started due to continued high blood pressure     4/2020 - patient had presented for replacement current of cranioplasty on April 6   And subsequently was admitted to the rehab center postoperatively      March 2021- patient was admitted to hospital with not feeling well and   Headache   CT scan of the head with no acute intracranial pathology  The other symptoms of memory loss were also present   Patient was referred to geriatrician      April 2021 - sciatic pain  Received brief steroid course     5/2021 - back pain  Went to ER  Given pred taper       79-year-old female with a past medical history of chronic kidney disease, venous stasis, HTN, hypothyroidism, lumbar canal stenosis, Anxiety, GERD, neuropathy, Gout, EF 36-85%, Grade 2 diastolic dysfunction who presents for follow up for CKD  To note, patient's  has now passed away in August      1) CKD II-III - Prior year had nl Cr prior to aug 2016 and then had SHABANA during diarrhea episode  To note, patient also has a long standing history of HTN  Creatinine in 2013, 0 9 mg/dL   Cr early 2016 was 0 80-0 9 mg/dL; then increased starting in august 2016 to 2 3 mg/dL and has fluctuated since  CT scan in 2016, kidneys appearing normal at that time       Etiology of CKD may be long standing HTN and ATN  Baseline Cr ~ 1 1 -1 3 mg/dL     Most recent creat stable 1 mg/dL March 2022 with stable electrolytes      - Urine eos 0%;   - A1c controlled prior  at 5 7% on December 8th in 2020  -U PCR stable  0 15 stable --> 0 18 gm/gm in 6/2020 --> 0 1 December 8th --> 0 12 gm/gm (3/22/2021) --> too low to quantify (6/2021)  -SPEP unrevealing  -UPEP unrevealing  - C3, C4 unrevealing  - Renal u/s with R 10 3 cm, L 10 4 cm, renal cortex 1 cm b/l; both kidneys slightly reduced in size; lower pole of R kidney is non obstructing calculus  - Pt follows with Urology team for nephrolithiasis  - No NSAIDs, the patient is not currently taking NSAID  -nuc stress test per Cardiology in Jan 2019 unrevealing  - repeat renal u/s 1/2019 - unrevealing with exception of renal cortical atrophy; but also 6 mm non shadowing calculus      Plan:     - no changes today  -blood pressures appear to be accurate given patient's home reading this morning and current office reading  Blood pressures today are borderline upper limit of normal but does have times at home when blood pressures are above goal   It is not clear how often the patient was checking blood pressures and she was not sure and therefore have asked the patient to check blood pressures twice a day for 1 week and call with results    May consider increasing losartan (but need to monitor for hypotension as the patient does have blood pressures that are low at times)  -patient does have a living will-offered the patient ACP visit but the patient states that does have a living will already with all of her wishes  - labs in 4-5 months  - appt in 5 months   -  continue current antihypertensive regimen   Patient is on losartan 50 mg in the morning and 25 mg in the evening, spironolactone 12 5 mg daily, amlodipine 10 mg daily, Bumex 2 mg daily (has low diastolics and syst is 780J)  -advised the patient to talk to PCP regarding sore on nose from BiPAP mask      2) SOB - Volume - follows with Cardiology     - on Bumetanide  - euvolemic in the office with minimal lower extremity edema     3) HTN -      - cont losartan, bumex and amlodipine, spironolactone  -  due to bradycardia, beta-blocker was held prior  - no changes today  See above    - see plan above     4) hypothyroid - as per Primary Care Physician     5) HPL - Primary Care following       6) Electrolytes - stable     7) MBD -     - Vit D 50 7 in nov 2019 --> 40 8 March 2022  - PTH improved 89 in nov 2019 --> 78 6 in June 2020 --> 88 3/8/2022     8) gout -      -monitor for flare     9) back pain - follows with Pain management     - pain sig improved with inj     10) Colonoscopy in feb with internal hemorrhoids and polyp removed       11) alk phos elevation     -improved     12) Anemia - Hb stable     -hemoglobin stable     13) Mitral valve calcification     - moderate to severe mitral regurgitation  - per Cardiology team  -patient saw cardiologist January 2022       14) elevated D-dimer prior-patient was given duplex of lower extremities which was unrevealing prior     15) knee pain after fall in august 2020     - saw Orthopedic team  - steroid injection was given     16) intracranial hemorrhage with mass effect requiring craniotomy on 2/11/2020     - now is status post replacement of cranioplasty in April 2020     17)  Anxiety- patient was started on Cymbalta  PCP is attempting off of gabapentin     - there were periods of not taking cymbalta     18)   Zoster infection in November 2020-treated with Valtrex per primary team    19) sore on nose from BiPAP mask-advised the patient to not use the mask currently  Patient has lost weight and therefore the mask is not fitting well  Patient was advised to call [employer] supply center to update mask    I have message the primary care physician also    - sore is healing     It was a pleasure evaluating your patient  Thank you for allowing our team to participate in the care of Ms Kendy Webber  Please do not hesitate to contact our team if further issues/questions shall arise in the interim     No problem-specific Assessment & Plan notes found for this encounter  No problem-specific Assessment & Plan notes found for this encounter  HPI:    Patient denies fevers, chills, nausea, vomiting, diarrhea  PATIENT INSTRUCTIONS:    There are no Patient Instructions on file for this visit  OBJECTIVE:  Current Weight: Weight - Scale: 74 4 kg (164 lb)  Vitals:    03/07/22 1020   BP: 138/56   BP Location: Right arm   Patient Position: Sitting   Cuff Size: Standard   Pulse: 78   Weight: 74 4 kg (164 lb)   Height: 5' 1" (1 549 m)    Body mass index is 30 99 kg/m²  REVIEW OF SYSTEMS:    Review of Systems   Constitutional: Negative  Negative for fatigue  HENT: Negative  Eyes: Negative  Respiratory: Negative  Negative for shortness of breath  Cardiovascular: Negative  Negative for leg swelling  Gastrointestinal: Negative  Endocrine: Negative  Genitourinary: Negative  Negative for difficulty urinating  Musculoskeletal: Negative  Skin: Negative  Allergic/Immunologic: Negative  Neurological: Negative  Hematological: Negative  Psychiatric/Behavioral: Negative  All other systems reviewed and are negative  PHYSICAL EXAM:      Physical Exam  Vitals and nursing note reviewed  Constitutional:       General: She is not in acute distress  Appearance: She is well-developed  She is not diaphoretic  HENT:      Head: Normocephalic and atraumatic  Nose:      Comments: Sore on nose 1 cm  healing  Eyes:      General: No scleral icterus  Right eye: No discharge  Left eye: No discharge  Conjunctiva/sclera: Conjunctivae normal    Neck:      Vascular: No JVD  Cardiovascular:      Rate and Rhythm: Normal rate and regular rhythm  Heart sounds: No murmur heard  No friction rub  No gallop      Pulmonary: Effort: Pulmonary effort is normal  No respiratory distress  Breath sounds: Normal breath sounds  No wheezing or rales  Abdominal:      General: Bowel sounds are normal  There is no distension  Palpations: Abdomen is soft  Tenderness: There is no abdominal tenderness  There is no rebound  Musculoskeletal:         General: No tenderness or deformity  Normal range of motion  Cervical back: Normal range of motion and neck supple  Comments: Minimal edema LE b/l   Skin:     General: Skin is warm and dry  Coloration: Skin is not pale  Findings: No erythema or rash  Neurological:      Mental Status: She is alert and oriented to person, place, and time  Coordination: Coordination normal    Psychiatric:         Behavior: Behavior normal          Thought Content:  Thought content normal          Judgment: Judgment normal          Medications:    Current Outpatient Medications:     amLODIPine (NORVASC) 10 mg tablet, Take 1 tablet (10 mg total) by mouth daily, Disp: 90 tablet, Rfl: 1    atorvastatin (LIPITOR) 40 mg tablet, Take 1 tablet (40 mg total) by mouth every evening, Disp: 90 tablet, Rfl: 1    bumetanide (BUMEX) 2 mg tablet, Take 1 tablet (2 mg total) by mouth daily, Disp: 135 tablet, Rfl: 3    Diclofenac Sodium (VOLTAREN) 1 %, Apply 2 g topically 4 (four) times a day, Disp: 100 g, Rfl: 1    DULoxetine (CYMBALTA) 20 mg capsule, Take 1 capsule (20 mg total) by mouth daily, Disp: 90 capsule, Rfl: 0    levothyroxine 88 mcg tablet, Take 1 tablet (88 mcg total) by mouth daily, Disp: 90 tablet, Rfl: 0    losartan (COZAAR) 25 mg tablet, Take 2 tab in AM and 1 tab in PM, Disp: 270 tablet, Rfl: 3    omeprazole (PriLOSEC) 20 mg delayed release capsule, Take 1 capsule (20 mg total) by mouth every morning, Disp: 90 capsule, Rfl: 1    spironolactone (ALDACTONE) 25 mg tablet, Take 0 5 tablets (12 5 mg total) by mouth daily, Disp: 45 tablet, Rfl: 3    Laboratory Results:  Results from last 7 days   Lab Units 03/02/22  0713   POTASSIUM mmol/L 4 0   CHLORIDE mmol/L 106   CO2 mmol/L 31   BUN mg/dL 31*   CREATININE mg/dL 1 03   CALCIUM mg/dL 9 6       Results for orders placed or performed in visit on 30/81/95   Basic metabolic panel   Result Value Ref Range    Sodium 141 136 - 145 mmol/L    Potassium 4 0 3 5 - 5 3 mmol/L    Chloride 106 100 - 108 mmol/L    CO2 31 21 - 32 mmol/L    ANION GAP 4 4 - 13 mmol/L    BUN 31 (H) 5 - 25 mg/dL    Creatinine 1 03 0 60 - 1 30 mg/dL    Glucose, Fasting 82 65 - 99 mg/dL    Calcium 9 6 8 3 - 10 1 mg/dL    eGFR 50 ml/min/1 73sq m   PTH, intact   Result Value Ref Range    PTH 88 0 (H) 18 4 - 80 1 pg/mL   Vitamin D 25 hydroxy   Result Value Ref Range    Vit D, 25-Hydroxy 40 8 30 0 - 100 0 ng/mL

## 2022-03-07 NOTE — LETTER
March 7, 2022     Michelle Connors MD  [address] [facility],[address]  [facility] [address]    Patient: Leydi Gregory   YOB: 1940   Date of Visit: 3/7/2022       Dear Dr Leeanna Gusman:    Thank you for referring Katie Calderon to me for evaluation  Below are my notes for this consultation  If you have questions, please do not hesitate to call me  I look forward to following your patient along with you  Sincerely,        Fabienne Homans, MD        CC: No Recipients  Fabienne Homans, MD  3/7/2022 11:18 AM  Sign when Signing Visit  NEPHROLOGY OFFICE VISIT   Leydi Gregory 79 y o  female MRN: [id number]  3/7/2022    Reason for Visit: CKD III    ASSESSMENT and PLAN:    I had the pleasure of seeing Ms Lili Ulrich today in the renal clinic for the continued management of CKD III  I had the pleasure of seeing Ms Wise today in the renal clinic for the continued management of CKD III      2/2020 - February with 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 on February 11th   Completed course of Keppra for seizure prophylaxis      3/30/2020 -Genet Alejandrina was increased to 50 mg twice a day from 50 in the morning and 25 in the evening due to high blood pressures     4/3/2020 - spironolactone 12 5 mg daily was started due to continued high blood pressure     4/2020 - patient had presented for replacement current of cranioplasty on April 6   And subsequently was admitted to the rehab center postoperatively      March 2021- patient was admitted to hospital with not feeling well and   Headache   CT scan of the head with no acute intracranial pathology  The other symptoms of memory loss were also present   Patient was referred to geriatrician      April 2021 - sciatic pain  Received brief steroid course     5/2021 - back pain  Went to ER   Given pred taper       59-year-old female with a past medical history of chronic kidney disease, venous stasis, HTN, hypothyroidism, lumbar canal stenosis, Anxiety, GERD, neuropathy, Gout, EF 00-45%, Grade 2 diastolic dysfunction who presents for follow up for CKD  To note, patient's  has now passed away in August 1) CKD II-III - Prior year had nl Cr prior to aug 2016 and then had SHABANA during diarrhea episode  To note, patient also has a long standing history of HTN  Creatinine in 2013, 0 9 mg/dL  Cr early 2016 was 0 80-0 9 mg/dL; then increased starting in august 2016 to 2 3 mg/dL and has fluctuated since  CT scan in 2016, kidneys appearing normal at that time       Etiology of CKD may be long standing HTN and ATN  Baseline Cr ~ 1 1 -1 3 mg/dL     Most recent creat stable 1 mg/dL March 2022 with stable electrolytes      - Urine eos 0%;   - A1c controlled prior  at 5 7% on December 8th in 2020  -U PCR stable  0 15 stable --> 0 18 gm/gm in 6/2020 --> 0 1 December 8th --> 0 12 gm/gm (3/22/2021) --> too low to quantify (6/2021)  -SPEP unrevealing  -UPEP unrevealing  - C3, C4 unrevealing  - Renal u/s with R 10 3 cm, L 10 4 cm, renal cortex 1 cm b/l; both kidneys slightly reduced in size; lower pole of R kidney is non obstructing calculus  - Pt follows with Urology team for nephrolithiasis  - No NSAIDs, the patient is not currently taking NSAID  -nuc stress test per Cardiology in Jan 2019 unrevealing  - repeat renal u/s 1/2019 - unrevealing with exception of renal cortical atrophy; but also 6 mm non shadowing calculus      Plan:     - no changes today  -blood pressures appear to be accurate given patient's home reading this morning and current office reading  Blood pressures today are borderline upper limit of normal but does have times at home when blood pressures are above goal   It is not clear how often the patient was checking blood pressures and she was not sure and therefore have asked the patient to check blood pressures twice a day for 1 week and call with results    May consider increasing losartan (but need to monitor for hypotension as the patient does have blood pressures that are low at times)  -patient does have a living will-offered the patient ACP visit but the patient states that does have a living will already with all of her wishes  - labs in 4-5 months  - appt in 5 months   -  continue current antihypertensive regimen   Patient is on losartan 50 mg in the morning and 25 mg in the evening, spironolactone 12 5 mg daily, amlodipine 10 mg daily, Bumex 2 mg daily (has low diastolics and syst is 981V)  -advised the patient to talk to PCP regarding sore on nose from BiPAP mask      2) SOB - Volume - follows with Cardiology     - on Bumetanide  - euvolemic in the office with minimal lower extremity edema     3) HTN -      - cont losartan, bumex and amlodipine, spironolactone  -  due to bradycardia, beta-blocker was held prior  - no changes today   See above    - see plan above     4) hypothyroid - as per Primary Care Physician     5) HPL - Primary Care following       6) Electrolytes - stable     7) MBD -     - Vit D 50 7 in nov 2019 --> 40 8 March 2022  - PTH improved 89 in nov 2019 --> 78 6 in June 2020 --> 88 3/8/2022     8) gout -      -monitor for flare     9) back pain - follows with Pain management     - pain sig improved with inj     10) Colonoscopy in feb with internal hemorrhoids and polyp removed       11) alk phos elevation     -improved     12) Anemia - Hb stable     -hemoglobin stable     13) Mitral valve calcification     - moderate to severe mitral regurgitation  - per Cardiology team  -patient saw cardiologist January 2022       14) elevated D-dimer prior-patient was given duplex of lower extremities which was unrevealing prior     15) knee pain after fall in august 2020     - saw Orthopedic team  - steroid injection was given     16) intracranial hemorrhage with mass effect requiring craniotomy on 2/11/2020     - now is status post replacement of cranioplasty in April 2020     17)  Anxiety- patient was started on Cymbalta  PCP is attempting off of gabapentin     - there were periods of not taking cymbalta     18)   Zoster infection in November 2020-treated with Valtrex per primary team    19) sore on nose from BiPAP mask-advised the patient to not use the mask currently  Patient has lost weight and therefore the mask is not fitting well  Patient was advised to call medical supply center to update mask  I have message the primary care physician also    - sore is healing     It was a pleasure evaluating your patient  Thank you for allowing our team to participate in the care of Ms Leydi Gregory  Please do not hesitate to contact our team if further issues/questions shall arise in the interim     No problem-specific Assessment & Plan notes found for this encounter  No problem-specific Assessment & Plan notes found for this encounter  HPI:    Patient denies fevers, chills, nausea, vomiting, diarrhea  PATIENT INSTRUCTIONS:    There are no Patient Instructions on file for this visit  OBJECTIVE:  Current Weight: Weight - Scale: 74 4 kg (164 lb)  Vitals:    03/07/22 1020   BP: 138/56   BP Location: Right arm   Patient Position: Sitting   Cuff Size: Standard   Pulse: 78   Weight: 74 4 kg (164 lb)   Height: 5' 1" (1 549 m)    Body mass index is 30 99 kg/m²  REVIEW OF SYSTEMS:    Review of Systems   Constitutional: Negative  Negative for fatigue  HENT: Negative  Eyes: Negative  Respiratory: Negative  Negative for shortness of breath  Cardiovascular: Negative  Negative for leg swelling  Gastrointestinal: Negative  Endocrine: Negative  Genitourinary: Negative  Negative for difficulty urinating  Musculoskeletal: Negative  Skin: Negative  Allergic/Immunologic: Negative  Neurological: Negative  Hematological: Negative  Psychiatric/Behavioral: Negative  All other systems reviewed and are negative  PHYSICAL EXAM:      Physical Exam  Vitals and nursing note reviewed     Constitutional: General: She is not in acute distress  Appearance: She is well-developed  She is not diaphoretic  HENT:      Head: Normocephalic and atraumatic  Nose:      Comments: Sore on nose 1 cm  healing  Eyes:      General: No scleral icterus  Right eye: No discharge  Left eye: No discharge  Conjunctiva/sclera: Conjunctivae normal    Neck:      Vascular: No JVD  Cardiovascular:      Rate and Rhythm: Normal rate and regular rhythm  Heart sounds: No murmur heard  No friction rub  No gallop  Pulmonary:      Effort: Pulmonary effort is normal  No respiratory distress  Breath sounds: Normal breath sounds  No wheezing or rales  Abdominal:      General: Bowel sounds are normal  There is no distension  Palpations: Abdomen is soft  Tenderness: There is no abdominal tenderness  There is no rebound  Musculoskeletal:         General: No tenderness or deformity  Normal range of motion  Cervical back: Normal range of motion and neck supple  Comments: Minimal edema LE b/l   Skin:     General: Skin is warm and dry  Coloration: Skin is not pale  Findings: No erythema or rash  Neurological:      Mental Status: She is alert and oriented to person, place, and time  Coordination: Coordination normal    Psychiatric:         Behavior: Behavior normal          Thought Content:  Thought content normal          Judgment: Judgment normal          Medications:    Current Outpatient Medications:     amLODIPine (NORVASC) 10 mg tablet, Take 1 tablet (10 mg total) by mouth daily, Disp: 90 tablet, Rfl: 1    atorvastatin (LIPITOR) 40 mg tablet, Take 1 tablet (40 mg total) by mouth every evening, Disp: 90 tablet, Rfl: 1    bumetanide (BUMEX) 2 mg tablet, Take 1 tablet (2 mg total) by mouth daily, Disp: 135 tablet, Rfl: 3    Diclofenac Sodium (VOLTAREN) 1 %, Apply 2 g topically 4 (four) times a day, Disp: 100 g, Rfl: 1    DULoxetine (CYMBALTA) 20 mg capsule, Take 1 capsule (20 mg total) by mouth daily, Disp: 90 capsule, Rfl: 0    levothyroxine 88 mcg tablet, Take 1 tablet (88 mcg total) by mouth daily, Disp: 90 tablet, Rfl: 0    losartan (COZAAR) 25 mg tablet, Take 2 tab in AM and 1 tab in PM, Disp: 270 tablet, Rfl: 3    omeprazole (PriLOSEC) 20 mg delayed release capsule, Take 1 capsule (20 mg total) by mouth every morning, Disp: 90 capsule, Rfl: 1    spironolactone (ALDACTONE) 25 mg tablet, Take 0 5 tablets (12 5 mg total) by mouth daily, Disp: 45 tablet, Rfl: 3    Laboratory Results:  Results from last 7 days   Lab Units 03/02/22  0713   POTASSIUM mmol/L 4 0   CHLORIDE mmol/L 106   CO2 mmol/L 31   BUN mg/dL 31*   CREATININE mg/dL 1 03   CALCIUM mg/dL 9 6       Results for orders placed or performed in visit on 52/53/93   Basic metabolic panel   Result Value Ref Range    Sodium 141 136 - 145 mmol/L    Potassium 4 0 3 5 - 5 3 mmol/L    Chloride 106 100 - 108 mmol/L    CO2 31 21 - 32 mmol/L    ANION GAP 4 4 - 13 mmol/L    BUN 31 (H) 5 - 25 mg/dL    Creatinine 1 03 0 60 - 1 30 mg/dL    Glucose, Fasting 82 65 - 99 mg/dL    Calcium 9 6 8 3 - 10 1 mg/dL    eGFR 50 ml/min/1 73sq m   PTH, intact   Result Value Ref Range    PTH 88 0 (H) 18 4 - 80 1 pg/mL   Vitamin D 25 hydroxy   Result Value Ref Range    Vit D, 25-Hydroxy 40 8 30 0 - 100 0 ng/mL

## 2022-04-23 DIAGNOSIS — M79.672 LEFT FOOT PAIN: ICD-10-CM

## 2022-04-23 DIAGNOSIS — M25.562 ACUTE PAIN OF LEFT KNEE: ICD-10-CM

## 2022-05-27 ENCOUNTER — OFFICE VISIT (OUTPATIENT)
Dept: CARDIOLOGY CLINIC | Facility: CLINIC | Age: 82
End: 2022-05-27
Payer: MEDICARE

## 2022-05-27 ENCOUNTER — TELEPHONE (OUTPATIENT)
Dept: CARDIOLOGY CLINIC | Facility: CLINIC | Age: 82
End: 2022-05-27

## 2022-05-27 VITALS
DIASTOLIC BLOOD PRESSURE: 66 MMHG | TEMPERATURE: 98 F | OXYGEN SATURATION: 99 % | HEIGHT: 61 IN | WEIGHT: 165 LBS | SYSTOLIC BLOOD PRESSURE: 120 MMHG | HEART RATE: 100 BPM | BODY MASS INDEX: 31.15 KG/M2

## 2022-05-27 DIAGNOSIS — R29.6 FREQUENT FALLS: ICD-10-CM

## 2022-05-27 DIAGNOSIS — I48.0 PAROXYSMAL ATRIAL FIBRILLATION (HCC): Primary | ICD-10-CM

## 2022-05-27 DIAGNOSIS — I49.1 PAC (PREMATURE ATRIAL CONTRACTION): ICD-10-CM

## 2022-05-27 DIAGNOSIS — R26.81 UNSTABLE GAIT: ICD-10-CM

## 2022-05-27 DIAGNOSIS — Z91.89 AT RISK FOR INJURY ASSOCIATED WITH ANTICOAGULATION: ICD-10-CM

## 2022-05-27 DIAGNOSIS — Z79.01 CURRENT USE OF LONG TERM ANTICOAGULATION: Primary | ICD-10-CM

## 2022-05-27 DIAGNOSIS — I10 BENIGN ESSENTIAL HYPERTENSION: ICD-10-CM

## 2022-05-27 DIAGNOSIS — I50.32 CHRONIC DIASTOLIC (CONGESTIVE) HEART FAILURE (HCC): ICD-10-CM

## 2022-05-27 DIAGNOSIS — I34.0 MODERATE TO SEVERE MITRAL REGURGITATION: ICD-10-CM

## 2022-05-27 PROCEDURE — 93000 ELECTROCARDIOGRAM COMPLETE: CPT | Performed by: INTERNAL MEDICINE

## 2022-05-27 PROCEDURE — 99215 OFFICE O/P EST HI 40 MIN: CPT | Performed by: INTERNAL MEDICINE

## 2022-05-27 PROCEDURE — 93242 EXT ECG>48HR<7D RECORDING: CPT | Performed by: INTERNAL MEDICINE

## 2022-05-27 RX ORDER — CLOPIDOGREL BISULFATE 75 MG/1
75 TABLET ORAL DAILY
Qty: 30 TABLET | Refills: 1 | Status: SHIPPED | OUTPATIENT
Start: 2022-05-27 | End: 2022-06-20 | Stop reason: SDUPTHER

## 2022-05-27 RX ORDER — PANTOPRAZOLE SODIUM 20 MG/1
20 TABLET, DELAYED RELEASE ORAL DAILY
Qty: 30 TABLET | Refills: 1 | Status: SHIPPED | OUTPATIENT
Start: 2022-05-27 | End: 2022-07-25

## 2022-05-27 NOTE — PROGRESS NOTES
Cardiology Follow Up  [facility]  1940  [id number]  [address]  [facility] ZACH Peterson [address] [address]    1  Chronic diastolic (congestive) heart failure (HCC)  POCT ECG   2  Benign essential hypertension  POCT ECG   3  Moderate to severe mitral regurgitation  POCT ECG    Ambulatory referral to Physical Therapy    AMB extended holter monitor   4  PAC (premature atrial contraction)  POCT ECG    clopidogrel (Plavix) 75 mg tablet    pantoprazole (PROTONIX) 20 mg tablet   5  Paroxysmal atrial fibrillation (HCC)  clopidogrel (Plavix) 75 mg tablet    pantoprazole (PROTONIX) 20 mg tablet    Ambulatory referral to Physical Therapy    AMB extended holter monitor      Discussion/Plan:  Atrial fibrillation new onset- previously she has had bradycardia on beta-blockers  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  I will have her wear a Holter monitor to evaluate her atrial fibrillation  She has a high chads Vasc score given her age of 79, woman gender, hypertension, and previous hypertensive stroke  However she has a significant fall risk  She has had a fall 2 weeks ago  She uses a cane for ambulation at times but not every day  We will have her evaluated at the balance center  I discussed in detail with the patient and her daughter-in-law  They will also talk with her family about anticoagulation for valvular AFib with Coumadin therapy  For now we will keep her on Plavix 75 mg  Moderate to severe mitral regurgitation- blood pressure optimized  Continue amlodipine 10 mg  Losartan 50 mg morning and 50mgh in evening  Bumex 2 mg daily  We discussed about tight blood pressure control  She will continue with a low-salt diet  Her blood pressures have been optimized  Possible consideration for MitraClip evaluation        Intraparenchymal bleed/cranioplasty- previous history which makes her at higher risk for anticoagulation usage  Acute on chronic diastolic heart failure with a component of lymphedema- improved volume status  Lower ext better  - Weight loss  - Compression socks + wedge pillow  - Increase bumex 2mg daily +  - Low-salt diet   - Elevation of legs + walking  -- if with continued lower extremity edema consideration for compression boots    Chronic dyspnea-I believe this is multifactorial including chronic deconditioning from severe back pain  BMI of 41  Some retained lower extremity edema  Her PFTs were reviewed which also show some restriction  She needs to started structured exercise program   Possible swimming or water sports given her severe back discomfort     Sleep apnea  -- cpap    Incomplete rbbb    Acute on chronic kidney disease follow-up with renal  - continue cozaar +  - Bumex 2mg daily    Triglycerides- controlled  - 4000 mg omega-3 daily    WENDI- on cpap      Interval History:  She denies having chest pain  Her edema is better  Taking omega 3 currently  No bleeding or bruising  No dizziness or light-headness  Blood pressure very well controlled  Has back pain  12/11/2018:  She reports having some exertional shortness of breath  Her lower extremity edema has improved but is persistent  She denies feeling dizziness or lightheadedness  She denies having any falls  She denies having any bleeding or bruising     06/12/2019:  She reports some improvement in her shortness of breath but still has dyspnea while sitting  She is unable to walk secondary to severe back pain  She denies feeling dizziness or lightheadedness  Her lower extremity edema has improved  She is trying a elevator legs  She does wear compression socks  She is compliant with CPAP     31/20: [de-identified]year-old with recent intraparenchymal hemorrhage status post right decompressive craniectomy and evacuation of intraparenchymal hematoma  She is pending follow-up bone flap by neuro surgery    She tolerated her previous procedure without evidence of decompensated heart failure  She was noted to have some bradycardia and her beta-blocker was discontinued  She is compliant with her antihypertensive medications  She is compliant with her diuretics  She denies having significant worsening in her chronic dyspnea or lower extremity edema    02/24/2021:  She denies currently feeling significant shortness of breath  She reports her lower extremity swelling is well controlled  We reviewed through her recent EKG  No prior history of atrial fibrillation  She is compliant with her medications  Denies having any falls  06/07/2021:  Her weight has been stable  She denies having major lower extremity swelling  We reviewed through her last echocardiogram   Reviewed through her Holter monitor which did not show evidence of atrial fibrillation or atrial flutter  She is compliant with her CPAP  She is compliant with her diuretics  Reviewed her last lab work  01/21/2022:  Her blood pressures have been controlled  She denies having major shortness of breath  She denies having chest heaviness  We reviewed through her recent echocardiogram   No major change  05/27/2022:  She had a mechanical fall 2 weeks ago  She is now seen in atrial fibrillation newly recognized  Her last event monitor from last year was negative for atrial fibrillation  She denies having major palpitations  She has chronic shortness of breath  She states her weight has been stable on her lower extremity swelling has been also stable  She has been compliant with her diuretics  She denies feeling dizziness      Patient Active Problem List   Diagnosis    Benign essential hypertension    Chronic diastolic (congestive) heart failure (HCC)    Hypothyroidism    Arthropathy of knee    Hallux abductovalgus with bunions, unspecified laterality    CKD (chronic kidney disease), stage III ([address] )    Diverticulitis of colon    Chronic gout without tophus    Hiatal hernia    Hyperlipemia    Hyperuricemia    Morbid obesity with body mass index of 40 0-44 9 in adult (HCC)    Nephrolithiasis    WENDI (obstructive sleep apnea)    Pseudogout of left wrist    Gastroesophageal reflux disease without esophagitis    Exertional shortness of breath    Medicare annual wellness visit, subsequent    Intraparenchymal hemorrhage of brain ([facility] )    Prediabetes    Status post right frontotemporal replacement cranioplasty    BMI 38 0-38 9,adult    Sciatica of left side    Spinal stenosis of lumbar region with neurogenic claudication    Depressed mood     Past Medical History:   Diagnosis Date    Anxiety     Arthritis     joints    Cataract     Disease of thyroid gland     Eczema     GERD (gastroesophageal reflux disease)     Gout     Heart failure (HCC)     Hepatitis     Hiatal hernia     Hypertension     Onychomycosis     last assessed: 4/29/16    Orthopnea     resolved: 1/8/16    Pseudogout of left wrist     Sleep apnea     wears CPAP    Stroke [facility])      Social History     Socioeconomic History    Marital status:      Spouse name: Not on file    Number of children: Not on file    Years of education: Not on file    Highest education level: Not on file   Occupational History    Not on file   Tobacco Use    Smoking status: Never Smoker    Smokeless tobacco: Never Used   Vaping Use    Vaping Use: Never used   Substance and Sexual Activity    Alcohol use: Yes     Alcohol/week: 0 0 standard drinks     Comment: Rare      Drug use: Never    Sexual activity: Not Currently     Birth control/protection: Post-menopausal   Other Topics Concern    Not on file   Social History Narrative    Daily coffee consumption    Lack of exercise    Sleeps 6-7 hours a day     Social Determinants of Health     Financial Resource Strain: Not on file   Food Insecurity: Not on file   Transportation Needs: Not on file   Physical Activity: Not on file   Stress: Not on file   Social Connections: Not on file   Intimate Partner Violence: Not on file   Housing Stability: Not on file      Family History   Problem Relation Age of Onset    Diabetes Mother     Coronary artery disease Mother     Arthritis Mother     Hypertension Mother     Hypertension Father     Diabetes Brother     Diabetes Son     Arthritis Family      Past Surgical History:   Procedure Laterality Date    ABDOMINAL SURGERY          BRAIN HEMATOMA EVACUATION Right 2020    Procedure: Right decompressive craniectomy and evacuation of intraparenchymal hematoma; Surgeon: Nano Azevedo MD;  [facility];  Service: Neurosurgery    BREAST SURGERY Right     cyst removal    CARPAL TUNNEL RELEASE Left     CARPAL TUNNEL RELEASE Right     CATARACT EXTRACTION       SECTION  1960    x1    COLONOSCOPY N/A 2018    Procedure: COLONOSCOPY;  Surgeon: Jonh Ugarte MD;  Location: [place] [address] GI LAB; Service: Gastroenterology    DILATION AND CURETTAGE OF UTERUS  1967    EYE SURGERY Left 2006    cataracts    HERNIA REPAIR      umbilical hernia    INCISIONAL HERNIA REPAIR      incarcerated    KNEE ARTHROSCOPY Right     SD REPLACE SKULL PLATE/FLAP Right     Procedure: right frontotemporal replacement cranioplasty;  Surgeon: Nano Azevedo MD;  [facility];  Service: Neurosurgery    SD XCAPSL CTRC RMVL INSJ IO LENS PROSTH W/O ECP Right 3/27/2017    Procedure: EXTRACTION EXTRACAPSULAR CATARACT PHACO INTRAOCULAR LENS (IOL);   Surgeon: Jose Daniel Wilder MD;  [facility] MA[facility] OR;  Service: Ophthalmology       Current Outpatient Medications:     amLODIPine (NORVASC) 10 mg tablet, Take 1 tablet (10 mg total) by mouth daily, Disp: 90 tablet, Rfl: 1    atorvastatin (LIPITOR) 40 mg tablet, Take 1 tablet (40 mg total) by mouth every evening, Disp: 90 tablet, Rfl: 1    bumetanide (BUMEX) 2 mg tablet, Take 1 tablet (2 mg total) by mouth daily, Disp: 135 tablet, Rfl: 3    clopidogrel (Plavix) 75 mg tablet, Take 1 tablet (75 mg total) by mouth daily, Disp: 30 tablet, Rfl: 1    Diclofenac Sodium (VOLTAREN) 1 %, Apply 2 g topically 4 (four) times a day, Disp: 100 g, Rfl: 0    DULoxetine (CYMBALTA) 20 mg capsule, Take 1 capsule (20 mg total) by mouth daily, Disp: 90 capsule, Rfl: 0    levothyroxine 88 mcg tablet, Take 1 tablet (88 mcg total) by mouth daily, Disp: 90 tablet, Rfl: 0    losartan (COZAAR) 25 mg tablet, Take 2 tab in AM and 1 tab in PM, Disp: 270 tablet, Rfl: 3    pantoprazole (PROTONIX) 20 mg tablet, Take 1 tablet (20 mg total) by mouth daily, Disp: 30 tablet, Rfl: 1    spironolactone (ALDACTONE) 25 mg tablet, Take 0 5 tablets (12 5 mg total) by mouth daily, Disp: 45 tablet, Rfl: 3  No Known Allergies    Review of Systems:  Review of Systems   Constitutional: Negative  Negative for activity change, appetite change, chills, diaphoresis, fatigue, fever and unexpected weight change  HENT: Negative  Negative for congestion, dental problem, drooling, ear discharge, ear pain, facial swelling, hearing loss, mouth sores, nosebleeds, postnasal drip, rhinorrhea, sinus pressure, sinus pain, sneezing, sore throat, tinnitus, trouble swallowing and voice change  Eyes: Negative  Negative for photophobia, pain, redness, itching and visual disturbance  Respiratory: Negative  Negative for apnea, cough, choking, chest tightness, shortness of breath, wheezing and stridor  Cardiovascular: Positive for leg swelling  Negative for chest pain and palpitations  Gastrointestinal: Negative  Negative for abdominal distention, abdominal pain, anal bleeding, blood in stool, constipation, diarrhea, nausea, rectal pain and vomiting  Endocrine: Negative  Negative for cold intolerance, heat intolerance, polydipsia, polyphagia and polyuria  Genitourinary: Negative    Negative for decreased urine volume, difficulty urinating, dyspareunia, dysuria, enuresis, flank pain, frequency, genital sores, hematuria, menstrual problem, pelvic pain, urgency, vaginal bleeding, vaginal discharge and vaginal pain  Musculoskeletal: Positive for back pain  Negative for arthralgias, gait problem, joint swelling, myalgias, neck pain and neck stiffness  Skin: Negative  Negative for color change, pallor, rash and wound  Allergic/Immunologic: Negative  Negative for environmental allergies, food allergies and immunocompromised state  Neurological: Negative  Negative for dizziness, tremors, seizures, syncope, facial asymmetry, speech difficulty, weakness, light-headedness, numbness and headaches  Hematological: Negative  Negative for adenopathy  Does not bruise/bleed easily  Psychiatric/Behavioral: Negative  Negative for agitation, behavioral problems, confusion, decreased concentration, dysphoric mood, hallucinations, self-injury, sleep disturbance and suicidal ideas  The patient is not nervous/anxious and is not hyperactive  All other systems reviewed and are negative  Vitals:    05/27/22 1037   BP: 120/66   BP Location: Right arm   Patient Position: Sitting   Cuff Size: Standard   Pulse: 100   Temp: 98 °F (36 7 °C)   SpO2: 99%   Weight: 74 8 kg (165 lb)   Height: 5' 1" (1 549 m)     Physical Exam:  Physical Exam  Vitals and nursing note reviewed  Constitutional:       General: She is not in acute distress  Appearance: She is well-developed  She is not diaphoretic  HENT:      Head: Normocephalic and atraumatic  Right Ear: External ear normal       Left Ear: External ear normal    Eyes:      General: No scleral icterus  Right eye: No discharge  Left eye: No discharge  Conjunctiva/sclera: Conjunctivae normal       Pupils: Pupils are equal, round, and reactive to light  Neck:      Thyroid: No thyromegaly  Vascular: No JVD  Trachea: No tracheal deviation  Cardiovascular:      Rate and Rhythm: Normal rate  Rhythm irregular        Heart sounds: Murmur heard  No friction rub  Gallop present  Pulmonary:      Effort: Pulmonary effort is normal  No respiratory distress  Breath sounds: Normal breath sounds  No stridor  No wheezing or rales  Chest:      Chest wall: No tenderness  Abdominal:      General: Bowel sounds are normal  There is no distension  Palpations: Abdomen is soft  There is no mass  Tenderness: There is no abdominal tenderness  There is no guarding or rebound  Musculoskeletal:         General: No tenderness or deformity  Normal range of motion  Cervical back: Normal range of motion and neck supple  Skin:     General: Skin is warm and dry  Coloration: Skin is not pale  Findings: No erythema or rash  Neurological:      Mental Status: She is alert and oriented to person, place, and time  Cranial Nerves: No cranial nerve deficit  Motor: No abnormal muscle tone  Coordination: Coordination normal       Deep Tendon Reflexes: Reflexes are normal and symmetric  Psychiatric:         Behavior: Behavior normal          Thought Content:  Thought content normal          Judgment: Judgment normal          Labs:     Lab Results   Component Value Date    WBC 6 01 11/30/2021    HGB 12 2 11/30/2021    HCT 38 6 11/30/2021    MCV 90 11/30/2021     11/30/2021     Lab Results   Component Value Date    K 4 0 03/02/2022     03/02/2022    CO2 31 03/02/2022    BUN 31 (H) 03/02/2022    CREATININE 1 03 03/02/2022    GLUCOSE 124 02/11/2020    GLUF 82 03/02/2022    CALCIUM 9 6 03/02/2022    CORRECTEDCA 9 8 11/30/2021    AST 14 11/30/2021    ALT 21 11/30/2021    ALKPHOS 111 11/30/2021    EGFR 50 03/02/2022     Lab Results   Component Value Date    CHOL 191 12/27/2013    CHOL 218 09/21/2013     Lab Results   Component Value Date    HDL 62 11/30/2021    HDL 59 06/28/2021    HDL 54 12/08/2020     Lab Results   Component Value Date    LDLCALC 47 11/30/2021    LDLCALC 37 06/28/2021    LDLCALC 45 2020     Lab Results   Component Value Date    TRIG 73 2021    TRIG 105 2021    TRIG 79 2020     Lab Results   Component Value Date    HGBA1C 5 6 2021       Imaging & Testing   I have personally reviewed pertinent reports  EKG: Personally reviewed  Normal sinus rhythm no acute st/t wave    Cardiac testing:   Results for orders placed during the hospital encounter of 01/15/16   Echo complete with contrast if indicated    Narrative [address]  [address] [facility] [address]  [phone]    Transthoracic Echocardiogram  2D, M-mode, Doppler, and Color Doppler    Study date:  15-Berny-2016    Patient: Sergey Horvath  MR number: [id number]  Account number: [de-identified]  : 1940  Age: 67 years  Gender: Female  Status: Routine  Location: Echo lab  Height: 61 in  Weight: 214 5 lb  BP: 132/ 84 mmHg    Indications: HTN    Diagnoses: 401 9 - HYPERTENSION NOS    Sonographer:  Edgardo Lou  Primary Physician:  Tariq Mendoza  Referring Physician:  Tisha Matthew:  Ilan Mays  Interpreting Physician:  Chu Marrero DO    SUMMARY    LEFT VENTRICLE:  Systolic function was at the lower limits of normal  Ejection fraction was  estimated in the range of 50 % to 55 %  There were no regional wall motion abnormalities  There was moderate concentric hypertrophy  Features were consistent with a pseudonormal left ventricular filling pattern,  with concomitant abnormal relaxation and increased filling pressure (grade 2  diastolic dysfunction)  Doppler parameters were consistent with elevated mean  left atrial filling pressure  LEFT ATRIUM:  The atrium was moderately dilated  RIGHT ATRIUM:  The atrium was markedly dilated  MITRAL VALVE:  There was marked annular calcification  There was moderate regurgitation  TRICUSPID VALVE:  There was mild regurgitation  Pulmonary artery systolic pressure was mildly increased    Estimated peak PA pressure was 46 mmHg  HISTORY: PRIOR HISTORY: Patient has no history of cardiovascular disease  PROCEDURE: The procedure was performed in the echo lab  This was a routine  study  The transthoracic approach was used  The study included complete 2D  imaging, M-mode, complete spectral Doppler, and color Doppler  The heart rate  was 77 bpm, at the start of the study  Echocardiographic views were limited due  to poor acoustic window availability and decreased penetration  This was a  technically difficult study  LEFT VENTRICLE: Size was normal  Systolic function was at the lower limits of  normal  Ejection fraction was estimated in the range of 50 % to 55 %  There  were no regional wall motion abnormalities  There was moderate concentric  hypertrophy  DOPPLER: Features were consistent with a pseudonormal left  ventricular filling pattern, with concomitant abnormal relaxation and increased  filling pressure (grade 2 diastolic dysfunction)  Doppler parameters were  consistent with elevated mean left atrial filling pressure  RIGHT VENTRICLE: The size was normal  Systolic function was normal  DOPPLER:  Systolic pressure was within the normal range  LEFT ATRIUM: The atrium was moderately dilated  No thrombus was identified  RIGHT ATRIUM: The atrium was markedly dilated  MITRAL VALVE: There was marked annular calcification  Valve structure was  normal  There was normal leaflet separation  No echocardiographic evidence for  prolapse  DOPPLER: The transmitral velocity was within the normal range  There  was no evidence for stenosis  There was moderate regurgitation  AORTIC VALVE: The valve was trileaflet  Leaflets exhibited normal thickness,  normal cuspal separation, and sclerosis  DOPPLER: Transaortic velocity was  within the normal range  There was no evidence for stenosis  There was no  regurgitation  TRICUSPID VALVE: The valve structure was normal  There was normal leaflet  separation   DOPPLER: The transtricuspid velocity was within the normal range  There was mild regurgitation  Pulmonary artery systolic pressure was mildly  increased  Estimated peak PA pressure was 46 mmHg  PULMONIC VALVE: Leaflets exhibited normal thickness, no calcification, and  normal cuspal separation  DOPPLER: The transpulmonic velocity was within the  normal range  There was mild regurgitation  PERICARDIUM: There was no thickening  There was no pericardial effusion  AORTA: The root exhibited normal size  PULMONARY ARTERY: The size was normal  The morphology appeared normal     SYSTEM MEASUREMENT TABLES    2D mode  AoR Diam 2D: 2 8 cm  LA Diam (2D): 5 3 cm  LA/Ao (2D): 1 89  FS (2D Teich): 25 3 %  IVSd (2D): 1 44 cm  LVDEV: 98 3 cm³  LVESV: 49 1 cm³  LVIDd(2D): 4 62 cm  LVISd (2D): 3 45 cm  LVPWd (2D): 1 3 cm  SV (Teich): 49 2 cm³    Apical four chamber  LVEF A4C: 55 %    Unspecified Scan Mode  MV Peak A Jesse: 1110 mm/s  MV Peak E Jesse  Mean: 1400 mm/s  MVA (PHT): 3 55 cm squared  PHT: 58 ms  Max P mm[Hg]  V Max: 2990 mm/s  Vmax: 3050 mm/s  RA Area: 19 6 cm squared  RA Volume: 55 3 cm³  TAPSE: 1 9 cm    [facility] Accredited Echocardiography Laboratory    Prepared and electronically signed by    Saroj Smyth DO  Signed 2016 17:37:47       EKG atrial fibrillation 100 beats per minute        Bharati Alaniz  Please call with any questions or suggestions    A description of the counselin minutes spent with family discussing about risk for long-term anticoagulation versus benefits  Goals and Barriers:  Patient's ability to self care:  Medication side effect reviewed with patient in detail and all their questions answered  "This note has been constructed using a voice recognition system  Therefore there may be syntax, spelling, and/or grammatical errors   Please call if you have any questions  "

## 2022-05-27 NOTE — TELEPHONE ENCOUNTER
Patient's daughter in law called to state that the script was written incorrectly for physical therapy  She reports that for balance the cardiac diagnoses wont work, that it will not cover balance therapy

## 2022-06-06 ENCOUNTER — EVALUATION (OUTPATIENT)
Dept: PHYSICAL THERAPY | Facility: CLINIC | Age: 82
End: 2022-06-06
Payer: MEDICARE

## 2022-06-06 DIAGNOSIS — R26.9 GAIT DISORDER: ICD-10-CM

## 2022-06-06 DIAGNOSIS — R26.89 BALANCE DISORDER: Primary | ICD-10-CM

## 2022-06-06 DIAGNOSIS — R53.81 PHYSICAL DECONDITIONING: ICD-10-CM

## 2022-06-06 PROCEDURE — 97163 PT EVAL HIGH COMPLEX 45 MIN: CPT

## 2022-06-06 NOTE — PROGRESS NOTES
PT Evaluation          Insurance:  AMA/CMS Eval/ Re-eval POC expires Jamil Herzog #/ Referral # Total    Start date  Expiration date Extension  Visit limitation? PT only or  PT+OT? Co-Insurance   CMS  Medicare 22  NA NA NA    PT No                                                                    Today's date: 2022  Patient name: Jose Daniel Tejada  : 1940  MRN: [id number]  Referring provider: Lenin Almanzar MD  Dx:   Encounter Diagnosis     ICD-10-CM    1  Balance disorder  R26 89    2  Gait disorder  R26 9    3  Physical deconditioning  R53 81          Assessment  Assessment details: Patient is a 79year old female presenting to skilled OPPT for IE with complaints of balance deficits and several falls which in turn limited her functional mobility at home and in the community  PMH significant for right temporal intraparenchymal hemorrhage in 2020  Strength screen revealed gross BLE weakness (3+ to 4-/5) per MMT grading  Coordination and sensation screen grossly within normal limits  Patient performed below age-matched normative values for the following outcome measures: 5 x STS, TUG, Pro, DGI, and FGA, suggesting deficits in functional LE strength, safe ambulation, static balance, and dynamic balance, respectively  Per cutoff scores for the TUG, Pro, DGI, and FGA she is classified as HIGH fall risk  All normative values and cutoff scores taken from the APTA Neuro Section and Rehab Measures  Patient ambulated length of session with NO AD with following gait deficits: decreased step length with reduced heel strike/toe off and reduced reciprocal arm swing  Unable to perform 6 MWT, 10 MWT, and ABC Scale secondary to time constraints; plan to complete next session   Patient will benefit from skilled OPPT services to address the aforementioned deficits in order to maximize functional mobility and reduce overall risk for falls         Impairments: Abnormal gait, Activity intolerance, Impaired balance and Impaired physical strength  Understanding of Dx/Px/POC: Excellent  Prognosis: Good    Patient verbalized understanding of POC  Please contact me if you have any questions or recommendations  Thank you for the referral and the opportunity to share in [phone] care          Plan  Plan details: 6 MWT, 10 MWT, ABC; general strengthening, balance, and conditioning    Patient would benefit from: PT Eval and Skilled PT  Planned modality interventions: Biofeedback, Cryotherapy, TENS and Thermotherapy: Hydrocollator Packs  Planned therapy interventions: Balance, Gait training, Neuromuscular re-education, Strengthening, Stretching, Therapeutic activities and Therapeutic exercises  Frequency: 2x/wk  Duration in weeks: 12  Plan of Care beginning date: 6/6/2022  Plan of Care expiration date: 12 weeks - 8/29/2022  Treatment plan discussed with: Patient and Family     Outcome Measures Initial Eval  6/6/2022   5xSTS 14 19 sec   TUG 18 60 sec   10 meter defer m/s   SANTOS 37/56   FGA 14/30   DGI 14/24   mCTSIB  - FTEO (firm)  - FTEC (firm)  - FTEO (foam)  - FTEC (foam)   defer   6MWT defer ft   ABC Scale defer             Goals  Short Term Goals (4 weeks):    - Patient will improve time on TUG by 2 9 seconds from 18 60 seconds to 15 7 seconds to facilitate improved safety in all ambulation  - Patient will be independent in basic HEP 2-3 weeks  - Patient will improve 5xSTS score by 2 3 seconds from 14 19 seconds to 11 89 seconds to promote improved LE functional strength needed for ADLs    Long Term Goals (12 weeks):  - Patient will be independent in a comprehensive home exercise program  - Patient will improve scoring on DGI by 2 6 points from 14/24 to 17/24 to progress safety  - Patient will improve gait speed by 0 18 m/s to improve safety with community ambulation  - Patient will improve SANTOS by 6 points from 37/56 to 43/56 in order to improve static balance and reduce risk for falls  - Patient will improve scoring on FGA by 4 points from  to  to progress safety with dynamic tasks  - Patient will be able to demonstrate HT in gait without veering  - Patient will improve 6 Minute Walk Test score by 190 feet to promote improved cardiovascular endurance  - Patient will report 50% reduction in near falls in order to improve safety with functional tasks and reduce his risk for falls  - Patient will report going on walks at least 3 days per week to promote independence and improved cardiovascular endurance  - Patient will report 50% reduction in near falls when ambulating on uneven terrain      Cut off score    All date taken from APTA Neuro Section or Rehab Measures      Pro/64  MDC: 6 pts  Age Norms:  61-76: M - 54   F - 55  70-79: M - 47   F - 53  80-89: M - 48   F - 50 5xSTS: Papo et al 2010  MDC: 2 3 sec  Age Norms:  60-69: 11 1 sec  70-79: 12 6 sec  80-89: 14 8 sec   TUG  MDC: 4 14 sec  Cut off score:  >13 5 sec community dwelling adults  >32 2 frail elderly  <20 I for basic transfers  >30 dependent on transfers 10 Meter Walk Test: Stephanie neri 2011  MDC: 0 18 m/s  20-29: M - 1 35 m   F - 1 34 m  30-39: M - 1 43 m   F - 1 34 m  40-49: M - 1 43 m   F - 1 39 m  50-59: M - 1 43 m   F - 1 31 m  60-69: M - 1 34 m   F - 1 24 m  70-79: M - 1 26 m   F - 1 13 m  80-89: M - 0 97 m   F - 0 94 m    Household Ambulator < 0 4 m/s  Limited Community Ambulator 0 4 - 0 8 m/s  [employer] 0 8 - 1 2 m/s  Safely cross the street > 1 2 m/s   FGA  MCID: 4 pts  Geriatrics/community < 22/30 fall risk  Geriatrics/community < 20/30 unexplained falls    DGI  MDC: vestibular - 4 pts  MDC: geriatric/community - 3 pts  Falls risk <19/24 mCTSIB  Norm: 20-60 yrs  Eyes open firm: norm sway 0 21-0 48  Eyes closed firm: norm sway 0 48-0 99  Eyes open foam: norm sway 0 38-0 71  Eyes closed foam: norm sway 0 70-2 22   6 Minute Walk Test  MDC: 190 98 ft  MCID: 164 ft    Age Norms  61-76: M - 1876 ft (571 80 m)  F - 1765 ft (537 98 m)  70-79: M - 1729 ft (527 00 m)  F - 1545 ft (470 92 m)  80-89: M - 1368 ft (416 97 m)  F - 1286 ft (391 97 m) ABC: Lala Trevino, 2003  <67% increased risk for falls         Subjective    History of Present Illness  - Mechanism of injury: Patient is a 79year old female presenting to skilled OPPT for IE with complaints of balance deficits and several falls  Patient states her last fall was approximately 3 weeks ago in which she fell backwards at the sink  She states she has had 2 falls in the past 6 months  She reports feeling generally off balance  Past medical history significant for hemorrhage in Feb 2020, for which she previously received OPPT       - Primary AD: NO AD at home, [facility] or rollator in [facility]  - Assist level at home: independent  - Decreased fine motor tasks: Yes      Social Support  - Steps to enter house: 2 KUMAR  - Stairs in house: none  - Lives in: single level home  - Lives with: son, daughter in law, grandson    - Employment status: retired  - Hand dominance: right    Treatments  - Previous treatment: 2 years ago post CVA  - Current treatment: none  - Diagnostic Testing: MRI Feb 2020      Objective     LE MMT  - R Hip Flexion: 3+/5   L Hip Flexion: 3+/5  - R Hip Extension: 3+/5  L Hip Extension: 3+/5  - R Hip Abduction: 4-/5  L Hip Abduction: 4-/5  - R Hip Adduction: 3+/5  L Hip Adduction: 3+/5  - R Knee Extension: 4-/5  L Knee Extension: 4-/5  - R Knee Flexion: 4-/5   L Knee Flexion: 4-/5  - R Ankle DF: 4-/5   L Ankle DF: 4-/5  - R Ankle PF: 4/5   L Ankle PF: 4/5    Sensation  - Light touch: intact  - Deep pressure: intact    Coordination  - Heel to Shin: limited due to decreased ROM/weakness  - Alternate Toe Taps: slightly slowed  - Finger to Nose: intact  - Finger to Finger: intact    Myelopathy Screen (>3/5 +)  - Santos's Reflex: -  - Babinski Reflex: NA  - Inverted Supinator Sign: -  - Age > 45: Yes  - Gait Deviation: No    Gait  - Abnormalities: decreased step length with reduced heel strike/toe off, reduced reciprocal arm swing           Outcome Measures Initial Eval  6/6/2022        5xSTS 14 19 sec        TUG 18 60 sec        10 meter defer m/s        SANTOS 37/56        FGA 14/30        DGI 14/24        mCTSIB  - FTEO (firm)  - FTEC (firm)  - FTEO (foam)  - FTEC (foam)   defer        6MWT defer ft        ABC Scale defer                            Precautions: history of hemorrhage, fall risk  Past Medical History:   Diagnosis Date    Anxiety     Arthritis     joints    Cataract     Disease of thyroid gland     Eczema     GERD (gastroesophageal reflux disease)     Gout     Heart failure ([facility] [address] [facility]     Hepatitis     Hiatal hernia     Hypertension     Onychomycosis     last assessed: 4/29/16    Orthopnea     resolved: 1/8/16    Pseudogout of left wrist     Sleep apnea     wears CPAP    Stroke ([facility] [address] )

## 2022-06-09 ENCOUNTER — TELEPHONE (OUTPATIENT)
Dept: CARDIOLOGY CLINIC | Facility: CLINIC | Age: 82
End: 2022-06-09

## 2022-06-09 ENCOUNTER — CLINICAL SUPPORT (OUTPATIENT)
Dept: CARDIOLOGY CLINIC | Facility: CLINIC | Age: 82
End: 2022-06-09
Payer: MEDICARE

## 2022-06-09 ENCOUNTER — OFFICE VISIT (OUTPATIENT)
Dept: PHYSICAL THERAPY | Facility: CLINIC | Age: 82
End: 2022-06-09
Payer: MEDICARE

## 2022-06-09 DIAGNOSIS — R53.81 PHYSICAL DECONDITIONING: ICD-10-CM

## 2022-06-09 DIAGNOSIS — I48.0 PAROXYSMAL ATRIAL FIBRILLATION (HCC): ICD-10-CM

## 2022-06-09 DIAGNOSIS — I34.0 MODERATE TO SEVERE MITRAL REGURGITATION: ICD-10-CM

## 2022-06-09 DIAGNOSIS — R26.89 BALANCE DISORDER: Primary | ICD-10-CM

## 2022-06-09 DIAGNOSIS — R26.9 GAIT DISORDER: ICD-10-CM

## 2022-06-09 PROCEDURE — 97530 THERAPEUTIC ACTIVITIES: CPT

## 2022-06-09 PROCEDURE — 97112 NEUROMUSCULAR REEDUCATION: CPT

## 2022-06-09 PROCEDURE — 93244 EXT ECG>48HR<7D REV&INTERPJ: CPT | Performed by: INTERNAL MEDICINE

## 2022-06-09 NOTE — PROGRESS NOTES
Daily Note     Today's date: 2022  Patient name: Mariusz Resendez  : 1940  MRN: [id number]  Referring provider: Bobbi Lombard, MD  Dx:   Encounter Diagnosis     ICD-10-CM    1  Balance disorder  R26 89    2  Gait disorder  R26 9    3  Physical deconditioning  R53 81                   Subjective: Patient presented to PT session today stating she thought she only had to come in for the PT evaluation to determine if she was "strong enough" for a cardiac procedure  Objective: See treatment diary below    TA:  - Patient education (see below)  - 6 MWT (see below)  - 10 MWT (see below)    Access Code: [email or web address]  URL: [email or web address]  Date: 2022  Prepared by: Felicity Molina    Exercises  · Standing March with Counter Support - 1 x daily - 7 x weekly - 2 sets - 10 reps - 3 sec hold  · Standing Hip Abduction with Counter Support - 1 x daily - 7 x weekly - 2 sets - 10 reps - 3 sec hold  · Standing Hip Extension with Counter Support - 1 x daily - 7 x weekly - 2 sets - 10 reps - 3 sec hold  · Sit to Stand - 1 x daily - 7 x weekly - 2 sets - 10 reps    NMR:  - STS from standard chair 2 x 10    Assessment: Patient tolerated treatment session well with continued focus on concluding outcome measure testing and establishing simple HEP  Patient performed below age-matched normative values for the 6 MWT and 10 MWT, suggesting deficits in cardiovascular endurance and ambulation speed, respectively  Had prolonged discussion with patient and patient's daughter-in-law that cardiologist can access PT IE in order to determine if they are comfortable with proceeding with catheterization procedure and that primary role of PT is to assess safe functional mobility, strength, and balance and determine if PT services are necessary  Both verbalized good understanding  Provided patient with simple HEP; she demonstrated comprehensive understanding   Patient states she is likely going to choose to self d/c at this time despite extensive education regarding benefits of PT, but will speak with doctor tomorrow  Patient will continue to benefit from skilled OPPT services to maximize functional mobility and reduce overall risk for falls  Plan: Continue per plan of care  Progress treatment as tolerated  Insurance:  AMA/CMS Eval/ Re-eval POC expires Nhi Leyva #/ Referral # Total    Start date  Expiration date Extension  Visit limitation? PT only or  PT+OT?  Co-Insurance   CMS  Medicare 6/6/22 8/29/22  NA NA NA    PT No                                                               Outcome Measures Initial Eval  6/6/2022 & DN 6/9/22        5xSTS 14 19 sec        TUG 18 60 sec        10 meter 0 67 m/s        SANTOS 37/56        FGA 14/30        DGI 14/24        mCTSIB  - FTEO (firm)  - FTEC (firm)  - FTEO (foam)  - FTEC (foam)   30 sec  30 sec  3 sec +  1 sec +        6MWT 695 ft w/ SPC (5:30 mins completed)

## 2022-06-10 ENCOUNTER — TELEPHONE (OUTPATIENT)
Dept: NEPHROLOGY | Facility: CLINIC | Age: 82
End: 2022-06-10

## 2022-06-13 ENCOUNTER — APPOINTMENT (OUTPATIENT)
Dept: PHYSICAL THERAPY | Facility: CLINIC | Age: 82
End: 2022-06-13
Payer: MEDICARE

## 2022-06-15 ENCOUNTER — APPOINTMENT (OUTPATIENT)
Dept: PHYSICAL THERAPY | Facility: CLINIC | Age: 82
End: 2022-06-15
Payer: MEDICARE

## 2022-06-20 ENCOUNTER — OFFICE VISIT (OUTPATIENT)
Dept: CARDIOLOGY CLINIC | Facility: CLINIC | Age: 82
End: 2022-06-20
Payer: MEDICARE

## 2022-06-20 ENCOUNTER — APPOINTMENT (OUTPATIENT)
Dept: PHYSICAL THERAPY | Facility: CLINIC | Age: 82
End: 2022-06-20
Payer: MEDICARE

## 2022-06-20 VITALS
SYSTOLIC BLOOD PRESSURE: 110 MMHG | DIASTOLIC BLOOD PRESSURE: 48 MMHG | TEMPERATURE: 98 F | WEIGHT: 166 LBS | HEIGHT: 61 IN | HEART RATE: 103 BPM | BODY MASS INDEX: 31.34 KG/M2 | OXYGEN SATURATION: 98 %

## 2022-06-20 DIAGNOSIS — R60.0 BILATERAL LEG EDEMA: ICD-10-CM

## 2022-06-20 DIAGNOSIS — R06.00 EXERTIONAL DYSPNEA: ICD-10-CM

## 2022-06-20 DIAGNOSIS — I10 BENIGN ESSENTIAL HYPERTENSION: ICD-10-CM

## 2022-06-20 DIAGNOSIS — I48.0 PAROXYSMAL ATRIAL FIBRILLATION (HCC): Primary | ICD-10-CM

## 2022-06-20 DIAGNOSIS — I49.1 PAC (PREMATURE ATRIAL CONTRACTION): ICD-10-CM

## 2022-06-20 DIAGNOSIS — I50.32 CHRONIC DIASTOLIC (CONGESTIVE) HEART FAILURE (HCC): ICD-10-CM

## 2022-06-20 DIAGNOSIS — Z79.01 CURRENT USE OF LONG TERM ANTICOAGULATION: ICD-10-CM

## 2022-06-20 DIAGNOSIS — I34.0 MODERATE TO SEVERE MITRAL REGURGITATION: ICD-10-CM

## 2022-06-20 PROCEDURE — 99214 OFFICE O/P EST MOD 30 MIN: CPT | Performed by: INTERNAL MEDICINE

## 2022-06-20 PROCEDURE — 93000 ELECTROCARDIOGRAM COMPLETE: CPT | Performed by: INTERNAL MEDICINE

## 2022-06-20 RX ORDER — CLOPIDOGREL BISULFATE 75 MG/1
75 TABLET ORAL DAILY
Qty: 90 TABLET | Refills: 1 | Status: SHIPPED | OUTPATIENT
Start: 2022-06-20 | End: 2022-08-04

## 2022-06-20 RX ORDER — ASPIRIN 325 MG
325 TABLET ORAL DAILY
COMMUNITY
End: 2022-06-20

## 2022-06-20 RX ORDER — ASPIRIN 81 MG/1
81 TABLET ORAL DAILY
Qty: 90 TABLET | Refills: 3 | Status: SHIPPED | OUTPATIENT
Start: 2022-06-20 | End: 2022-08-04

## 2022-06-20 RX ORDER — AMLODIPINE BESYLATE 10 MG/1
5 TABLET ORAL DAILY
Qty: 90 TABLET | Refills: 1 | Status: SHIPPED | COMMUNITY
Start: 2022-06-20

## 2022-06-21 NOTE — PROGRESS NOTES
Cardiology Follow Up  [facility]  1940  [id number]  [facility] PROFESSIONAL PLA   [facility]  [address] [address] [address]    1  Paroxysmal atrial fibrillation (HCC)  POCT ECG    metoprolol tartrate (LOPRESSOR) 25 mg tablet    clopidogrel (Plavix) 75 mg tablet   2  Moderate to severe mitral regurgitation  POCT ECG    metoprolol tartrate (LOPRESSOR) 25 mg tablet    AMB extended holter monitor   3  Current use of long term anticoagulation  POCT ECG    metoprolol tartrate (LOPRESSOR) 25 mg tablet    aspirin (ECOTRIN LOW STRENGTH) 81 mg EC tablet    clopidogrel (Plavix) 75 mg tablet   4  Benign essential hypertension  POCT ECG    amLODIPine (NORVASC) 10 mg tablet   5  Chronic diastolic (congestive) heart failure (HCC)  POCT ECG   6  PAC (premature atrial contraction)  POCT ECG    metoprolol tartrate (LOPRESSOR) 25 mg tablet    clopidogrel (Plavix) 75 mg tablet    AMB extended holter monitor   7  Bilateral leg edema  POCT ECG   8  Exertional dyspnea  POCT ECG      Discussion/Plan:  Atrial fibrillation persistent- concern for tachy-hola  Event monitor with periods of AFib with RVR rest   Also with nonsustained ventricular tachycardia  We will place her on low-dose beta-blocker metoprolol 12 5 mg twice a day  We will recheck the 3 day event monitor to rule out tachy-hola response/  She has a high chads Vasc score given her age of 79, woman gender, hypertension, and previous hypertensive stroke  However she has a significant fall risk  She has frequent falls  She uses a cane for ambulation at times but not every day  She is currently working with the [employer]  We have her on currently Plavix 75 mg and aspirin 81 mg  Possible uptitration to Eliquis if no recurrence of falls  Versus consideration for Watchman device    Moderate to severe mitral regurgitation- blood pressure optimized    Continue amlodipine 10 mg  Losartan 50 mg morning and 50mgh in evening  Bumex 2 mg daily  We discussed about tight blood pressure control  She will continue with a low-salt diet  Her blood pressures have been optimized  Possible consideration for MitraClip evaluation  We will plan for her to have a BORIS 3D to better evaluate    Intraparenchymal bleed/cranioplasty- previous history which makes her at higher risk for anticoagulation usage  Acute on chronic diastolic heart failure with a component of lymphedema- improved volume status  Lower ext better  - Weight loss  - Compression socks + wedge pillow  - Increase bumex 2mg daily +  - Low-salt diet   - Elevation of legs + walking  -- if with continued lower extremity edema consideration for compression boots    Chronic dyspnea-I believe this is multifactorial including chronic deconditioning from severe back pain  BMI of 41  Some retained lower extremity edema  Her PFTs were reviewed which also show some restriction  She needs to started structured exercise program   Possible swimming or water sports given her severe back discomfort     Sleep apnea  -- cpap mask titration    Incomplete rbbb    Acute on chronic kidney disease follow-up with renal  - continue cozaar +  - Bumex 2mg daily    Triglycerides- controlled  - 4000 mg omega-3 daily    WENDI- on cpap      Interval History:  She denies having chest pain  Her edema is better  Taking omega 3 currently  No bleeding or bruising  No dizziness or light-headness  Blood pressure very well controlled  Has back pain  12/11/2018:  She reports having some exertional shortness of breath  Her lower extremity edema has improved but is persistent  She denies feeling dizziness or lightheadedness  She denies having any falls  She denies having any bleeding or bruising     06/12/2019:  She reports some improvement in her shortness of breath but still has dyspnea while sitting  She is unable to walk secondary to severe back pain    She denies feeling dizziness or lightheadedness  Her lower extremity edema has improved  She is trying a elevator legs  She does wear compression socks  She is compliant with CPAP     31/20: [de-identified]year-old with recent intraparenchymal hemorrhage status post right decompressive craniectomy and evacuation of intraparenchymal hematoma  She is pending follow-up bone flap by neuro surgery  She tolerated her previous procedure without evidence of decompensated heart failure  She was noted to have some bradycardia and her beta-blocker was discontinued  She is compliant with her antihypertensive medications  She is compliant with her diuretics  She denies having significant worsening in her chronic dyspnea or lower extremity edema    02/24/2021:  She denies currently feeling significant shortness of breath  She reports her lower extremity swelling is well controlled  We reviewed through her recent EKG  No prior history of atrial fibrillation  She is compliant with her medications  Denies having any falls  06/07/2021:  Her weight has been stable  She denies having major lower extremity swelling  We reviewed through her last echocardiogram   Reviewed through her Holter monitor which did not show evidence of atrial fibrillation or atrial flutter  She is compliant with her CPAP  She is compliant with her diuretics  Reviewed her last lab work  01/21/2022:  Her blood pressures have been controlled  She denies having major shortness of breath  She denies having chest heaviness  We reviewed through her recent echocardiogram   No major change  05/27/2022:  She had a mechanical fall 2 weeks ago  She is now seen in atrial fibrillation newly recognized  Her last event monitor from last year was negative for atrial fibrillation  She denies having major palpitations  She has chronic shortness of breath  She states her weight has been stable on her lower extremity swelling has been also stable    She has been compliant with her diuretics  She denies feeling dizziness  06/21/2022:  She denies having recurrence of fall  She still has some exertional dyspnea  However overall she is feeling well  We reviewed her event monitor  S she is awaiting CPAP mask titration    Patient Active Problem List   Diagnosis    Benign essential hypertension    Chronic diastolic (congestive) heart failure (HCC)    Hypothyroidism    Arthropathy of knee    Hallux abductovalgus with bunions, unspecified laterality    CKD (chronic kidney disease), stage III ([place] [address] )    Diverticulitis of colon    Chronic gout without tophus    Hiatal hernia    Hyperlipemia    Hyperuricemia    Morbid obesity with body mass index of 40 0-44 9 in adult ([place] [address] )    Nephrolithiasis    WENDI (obstructive sleep apnea)    Pseudogout of left wrist    Gastroesophageal reflux disease without esophagitis    Exertional shortness of breath    Medicare annual wellness visit, subsequent    Intraparenchymal hemorrhage of brain ([place] [address] )    Prediabetes    Status post right frontotemporal replacement cranioplasty    BMI 38 0-38 9,adult    Sciatica of left side    Spinal stenosis of lumbar region with neurogenic claudication    Depressed mood     Past Medical History:   Diagnosis Date    Anxiety     Arthritis     joints    Cataract     Disease of thyroid gland     Eczema     GERD (gastroesophageal reflux disease)     Gout     Heart failure ([place] [address] )     Hepatitis     Hiatal hernia     Hypertension     Onychomycosis     last assessed: 4/29/16    Orthopnea     resolved: 1/8/16    Pseudogout of left wrist     Sleep apnea     wears CPAP    Stroke [facility])      Social History     Socioeconomic History    Marital status:       Spouse name: Not on file    Number of children: Not on file    Years of education: Not on file    Highest education level: Not on file   Occupational History    Not on file   Tobacco Use    Smoking status: Never Smoker    Smokeless tobacco: Never Used   Vaping Use    Vaping Use: Never used   Substance and Sexual Activity    Alcohol use: Yes     Alcohol/week: 0 0 standard drinks     Comment: Rare   Drug use: Never    Sexual activity: Not Currently     Birth control/protection: Post-menopausal   Other Topics Concern    Not on file   Social History Narrative    Daily coffee consumption    Lack of exercise    Sleeps 6-7 hours a day     Social Determinants of Health     Financial Resource Strain: Not on file   Food Insecurity: Not on file   Transportation Needs: Not on file   Physical Activity: Not on file   Stress: Not on file   Social Connections: Not on file   Intimate Partner Violence: Not on file   Housing Stability: Not on file      Family History   Problem Relation Age of Onset    Diabetes Mother     Coronary artery disease Mother     Arthritis Mother     Hypertension Mother     Hypertension Father     Diabetes Brother     Diabetes Son     Arthritis Family      Past Surgical History:   Procedure Laterality Date    ABDOMINAL SURGERY          BRAIN HEMATOMA EVACUATION Right 2020    Procedure: Right decompressive craniectomy and evacuation of intraparenchymal hematoma; Surgeon: Angelica Morgan MD;  [facility];  Service: Neurosurgery    BREAST SURGERY Right     cyst removal    CARPAL TUNNEL RELEASE Left     CARPAL TUNNEL RELEASE Right     CATARACT EXTRACTION       SECTION  1960    x1    COLONOSCOPY N/A 2018    Procedure: COLONOSCOPY;  Surgeon: Conor Guerrero MD;  Location[facility] GI LAB;   Service: Gastroenterology    DILATION AND CURETTAGE OF UTERUS  1967    EYE SURGERY Left 2006    cataracts    HERNIA REPAIR      umbilical hernia    INCISIONAL HERNIA REPAIR      incarcerated    KNEE ARTHROSCOPY Right     KY REPLACE SKULL PLATE/FLAP Right     Procedure: right frontotemporal replacement cranioplasty;  Surgeon: Angelica Morgan MD;  [facility]; Service: Neurosurgery    DE XCAPSL CTRC RMVL INSJ IO LENS PROSTH W/O ECP Right 3/27/2017    Procedure: EXTRACTION EXTRACAPSULAR CATARACT PHACO INTRAOCULAR LENS (IOL); Surgeon: John Bennett MD;  Location: [facility] MAIN OR;  Service: Ophthalmology       Current Outpatient Medications:     amLODIPine (NORVASC) 10 mg tablet, Take 0 5 tablets (5 mg total) by mouth daily, Disp: 90 tablet, Rfl: 1    aspirin (ECOTRIN LOW STRENGTH) 81 mg EC tablet, Take 1 tablet (81 mg total) by mouth daily, Disp: 90 tablet, Rfl: 3    atorvastatin (LIPITOR) 40 mg tablet, Take 1 tablet (40 mg total) by mouth every evening, Disp: 90 tablet, Rfl: 1    bumetanide (BUMEX) 2 mg tablet, Take 1 tablet (2 mg total) by mouth daily, Disp: 135 tablet, Rfl: 3    clopidogrel (Plavix) 75 mg tablet, Take 1 tablet (75 mg total) by mouth daily, Disp: 90 tablet, Rfl: 1    Diclofenac Sodium (VOLTAREN) 1 %, Apply 2 g topically 4 (four) times a day, Disp: 100 g, Rfl: 0    DULoxetine (CYMBALTA) 20 mg capsule, Take 1 capsule (20 mg total) by mouth daily, Disp: 90 capsule, Rfl: 0    levothyroxine 88 mcg tablet, Take 1 tablet (88 mcg total) by mouth daily, Disp: 90 tablet, Rfl: 0    losartan (COZAAR) 25 mg tablet, Take 2 tab in AM and 1 tab in PM, Disp: 270 tablet, Rfl: 3    metoprolol tartrate (LOPRESSOR) 25 mg tablet, Take 0 5 tablets (12 5 mg total) by mouth every 12 (twelve) hours, Disp: 60 tablet, Rfl: 2    pantoprazole (PROTONIX) 20 mg tablet, Take 1 tablet (20 mg total) by mouth daily, Disp: 30 tablet, Rfl: 1    spironolactone (ALDACTONE) 25 mg tablet, Take 0 5 tablets (12 5 mg total) by mouth daily, Disp: 45 tablet, Rfl: 3  No Known Allergies    Review of Systems:  Review of Systems   Constitutional: Negative  Negative for activity change, appetite change, chills, diaphoresis, fatigue, fever and unexpected weight change  HENT: Negative    Negative for congestion, dental problem, drooling, ear discharge, ear pain, facial swelling, hearing loss, mouth sores, nosebleeds, postnasal drip, rhinorrhea, sinus pressure, sinus pain, sneezing, sore throat, tinnitus, trouble swallowing and voice change  Eyes: Negative  Negative for photophobia, pain, redness, itching and visual disturbance  Respiratory: Positive for shortness of breath  Negative for apnea, cough, choking, chest tightness, wheezing and stridor  Cardiovascular: Positive for leg swelling  Negative for chest pain and palpitations  Gastrointestinal: Negative  Negative for abdominal distention, abdominal pain, anal bleeding, blood in stool, constipation, diarrhea, nausea, rectal pain and vomiting  Endocrine: Negative  Negative for cold intolerance, heat intolerance, polydipsia, polyphagia and polyuria  Genitourinary: Negative  Negative for decreased urine volume, difficulty urinating, dyspareunia, dysuria, enuresis, flank pain, frequency, genital sores, hematuria, menstrual problem, pelvic pain, urgency, vaginal bleeding, vaginal discharge and vaginal pain  Musculoskeletal: Positive for back pain  Negative for arthralgias, gait problem, joint swelling, myalgias, neck pain and neck stiffness  Skin: Negative  Negative for color change, pallor, rash and wound  Allergic/Immunologic: Negative  Negative for environmental allergies, food allergies and immunocompromised state  Neurological: Negative  Negative for dizziness, tremors, seizures, syncope, facial asymmetry, speech difficulty, weakness, light-headedness, numbness and headaches  Hematological: Negative  Negative for adenopathy  Does not bruise/bleed easily  Psychiatric/Behavioral: Negative  Negative for agitation, behavioral problems, confusion, decreased concentration, dysphoric mood, hallucinations, self-injury, sleep disturbance and suicidal ideas  The patient is not nervous/anxious and is not hyperactive  All other systems reviewed and are negative        Vitals:    06/20/22 1641   BP: (!) 110/48   BP Location: Right arm   Patient Position: Sitting   Cuff Size: Large   Pulse: 103   Temp: 98 °F (36 7 °C)   SpO2: 98%   Weight: 75 3 kg (166 lb)   Height: 5' 1" (1 549 m)     Physical Exam:  Physical Exam  Vitals and nursing note reviewed  Constitutional:       General: She is not in acute distress  Appearance: She is well-developed  She is not diaphoretic  HENT:      Head: Normocephalic and atraumatic  Right Ear: External ear normal       Left Ear: External ear normal    Eyes:      General: No scleral icterus  Right eye: No discharge  Left eye: No discharge  Conjunctiva/sclera: Conjunctivae normal       Pupils: Pupils are equal, round, and reactive to light  Neck:      Thyroid: No thyromegaly  Vascular: No JVD  Trachea: No tracheal deviation  Cardiovascular:      Rate and Rhythm: Normal rate  Rhythm irregular  Heart sounds: Murmur heard  No friction rub  Gallop present  Pulmonary:      Effort: Pulmonary effort is normal  No respiratory distress  Breath sounds: Normal breath sounds  No stridor  No wheezing or rales  Chest:      Chest wall: No tenderness  Abdominal:      General: Bowel sounds are normal  There is no distension  Palpations: Abdomen is soft  There is no mass  Tenderness: There is no abdominal tenderness  There is no guarding or rebound  Musculoskeletal:         General: No tenderness or deformity  Normal range of motion  Cervical back: Normal range of motion and neck supple  Skin:     General: Skin is warm and dry  Coloration: Skin is not pale  Findings: Lesion present  No erythema or rash  Neurological:      Mental Status: She is alert and oriented to person, place, and time  Cranial Nerves: No cranial nerve deficit  Motor: No abnormal muscle tone  Coordination: Coordination normal       Deep Tendon Reflexes: Reflexes are normal and symmetric     Psychiatric:         Behavior: Behavior normal  Thought Content: Thought content normal          Judgment: Judgment normal          Labs:     Lab Results   Component Value Date    WBC 6 01 2021    HGB 12 2 2021    HCT 38 6 2021    MCV 90 2021     2021     Lab Results   Component Value Date    K 4 0 2022     2022    CO2 31 2022    BUN 31 (H) 2022    CREATININE 1 03 2022    GLUCOSE 124 2020    GLUF 82 2022    CALCIUM 9 6 2022    CORRECTEDCA 9 8 2021    AST 14 2021    ALT 21 2021    ALKPHOS 111 2021    EGFR 50 2022     Lab Results   Component Value Date    CHOL 191 2013    CHOL 218 2013     Lab Results   Component Value Date    HDL 62 2021    HDL 59 2021    HDL 54 2020     Lab Results   Component Value Date    LDLCALC 47 2021    LDLCALC 37 2021    LDLCALC 45 2020     Lab Results   Component Value Date    TRIG 73 2021    TRIG 105 2021    TRIG 79 2020     Lab Results   Component Value Date    HGBA1C 5 6 2021       Imaging & Testing   I have personally reviewed pertinent reports  EKG: Personally reviewed  Normal sinus rhythm no acute st/t wave    Cardiac testing:   Results for orders placed during the hospital encounter of 01/15/16   Echo complete with contrast if indicated    Narrative [address]  [address] [facility] [phone]    Transthoracic Echocardiogram  2D, M-mode, Doppler, and Color Doppler    Study date:  15-Berny-2016    Patient: Johnny Acosta  MR number: [id number]  Account number: [de-identified]  : 1940  Age: 67 years  Gender: Female  Status: Routine  Location: Echo lab  Height: 61 in  Weight: 214 5 lb  BP: 132/ 84 mmHg    Indications: HTN    Diagnoses: 401 9 - HYPERTENSION NOS    Sonographer:  Edgardo Medley  Primary Physician:  Nicolás Vizcarra  Referring Physician:  Tresa Iyer:  Aime Guthrie [facility]  Interpreting Physician:  Poonam Ling, DO    SUMMARY    LEFT VENTRICLE:  Systolic function was at the lower limits of normal  Ejection fraction was  estimated in the range of 50 % to 55 %  There were no regional wall motion abnormalities  There was moderate concentric hypertrophy  Features were consistent with a pseudonormal left ventricular filling pattern,  with concomitant abnormal relaxation and increased filling pressure (grade 2  diastolic dysfunction)  Doppler parameters were consistent with elevated mean  left atrial filling pressure  LEFT ATRIUM:  The atrium was moderately dilated  RIGHT ATRIUM:  The atrium was markedly dilated  MITRAL VALVE:  There was marked annular calcification  There was moderate regurgitation  TRICUSPID VALVE:  There was mild regurgitation  Pulmonary artery systolic pressure was mildly increased  Estimated peak PA pressure was 46 mmHg  HISTORY: PRIOR HISTORY: Patient has no history of cardiovascular disease  PROCEDURE: The procedure was performed in the echo lab  This was a routine  study  The transthoracic approach was used  The study included complete 2D  imaging, M-mode, complete spectral Doppler, and color Doppler  The heart rate  was 77 bpm, at the start of the study  Echocardiographic views were limited due  to poor acoustic window availability and decreased penetration  This was a  technically difficult study  LEFT VENTRICLE: Size was normal  Systolic function was at the lower limits of  normal  Ejection fraction was estimated in the range of 50 % to 55 %  There  were no regional wall motion abnormalities  There was moderate concentric  hypertrophy  DOPPLER: Features were consistent with a pseudonormal left  ventricular filling pattern, with concomitant abnormal relaxation and increased  filling pressure (grade 2 diastolic dysfunction)  Doppler parameters were  consistent with elevated mean left atrial filling pressure      RIGHT VENTRICLE: The size was normal  Systolic function was normal  DOPPLER:  Systolic pressure was within the normal range  LEFT ATRIUM: The atrium was moderately dilated  No thrombus was identified  RIGHT ATRIUM: The atrium was markedly dilated  MITRAL VALVE: There was marked annular calcification  Valve structure was  normal  There was normal leaflet separation  No echocardiographic evidence for  prolapse  DOPPLER: The transmitral velocity was within the normal range  There  was no evidence for stenosis  There was moderate regurgitation  AORTIC VALVE: The valve was trileaflet  Leaflets exhibited normal thickness,  normal cuspal separation, and sclerosis  DOPPLER: Transaortic velocity was  within the normal range  There was no evidence for stenosis  There was no  regurgitation  TRICUSPID VALVE: The valve structure was normal  There was normal leaflet  separation  DOPPLER: The transtricuspid velocity was within the normal range  There was mild regurgitation  Pulmonary artery systolic pressure was mildly  increased  Estimated peak PA pressure was 46 mmHg  PULMONIC VALVE: Leaflets exhibited normal thickness, no calcification, and  normal cuspal separation  DOPPLER: The transpulmonic velocity was within the  normal range  There was mild regurgitation  PERICARDIUM: There was no thickening  There was no pericardial effusion  AORTA: The root exhibited normal size  PULMONARY ARTERY: The size was normal  The morphology appeared normal     SYSTEM MEASUREMENT TABLES    2D mode  AoR Diam 2D: 2 8 cm  LA Diam (2D): 5 3 cm  LA/Ao (2D): 1 89  FS (2D Teich): 25 3 %  IVSd (2D): 1 44 cm  LVDEV: 98 3 cm³  LVESV: 49 1 cm³  LVIDd(2D): 4 62 cm  LVISd (2D): 3 45 cm  LVPWd (2D): 1 3 cm  SV (Teich): 49 2 cm³    Apical four chamber  LVEF A4C: 55 %    Unspecified Scan Mode  MV Peak A Jesse: 1110 mm/s  MV Peak E Jesse   Mean: 1400 mm/s  MVA (PHT): 3 55 cm squared  PHT: 58 ms  Max P mm[Hg]  V Max: 2990 mm/s  Vmax: 3050 mm/s  RA Area: 19 6 cm squared  RA Volume: 55 3 cm³  TAPSE: 1 9 cm    [facility]    Prepared and electronically signed by    Nallely Shahid DO  Signed 2016 17:37:47       EKG atrial fibrillation 100 beats per minute        Nickolas Alaniz  Please call with any questions or suggestions    A description of the counselin minutes spent with family discussing about risk for long-term anticoagulation versus benefits  Goals and Barriers:  Patient's ability to self care:  Medication side effect reviewed with patient in detail and all their questions answered  "This note has been constructed using a voice recognition system  Therefore there may be syntax, spelling, and/or grammatical errors   Please call if you have any questions  "

## 2022-06-23 ENCOUNTER — APPOINTMENT (OUTPATIENT)
Dept: PHYSICAL THERAPY | Facility: CLINIC | Age: 82
End: 2022-06-23
Payer: MEDICARE

## 2022-06-24 ENCOUNTER — LAB (OUTPATIENT)
Dept: LAB | Facility: CLINIC | Age: 82
End: 2022-06-24
Payer: MEDICARE

## 2022-06-24 ENCOUNTER — RA CDI HCC (OUTPATIENT)
Dept: OTHER | Facility: HOSPITAL | Age: 82
End: 2022-06-24

## 2022-06-24 DIAGNOSIS — N18.31 STAGE 3A CHRONIC KIDNEY DISEASE (HCC): ICD-10-CM

## 2022-06-24 DIAGNOSIS — I10 BENIGN ESSENTIAL HYPERTENSION: ICD-10-CM

## 2022-06-24 DIAGNOSIS — E03.9 HYPOTHYROIDISM, UNSPECIFIED TYPE: Chronic | ICD-10-CM

## 2022-06-24 DIAGNOSIS — R73.03 PREDIABETES: ICD-10-CM

## 2022-06-24 DIAGNOSIS — E78.5 HYPERLIPIDEMIA, UNSPECIFIED HYPERLIPIDEMIA TYPE: ICD-10-CM

## 2022-06-24 LAB
ALBUMIN SERPL BCP-MCNC: 3.4 G/DL (ref 3.5–5)
ALP SERPL-CCNC: 96 U/L (ref 46–116)
ALT SERPL W P-5'-P-CCNC: 25 U/L (ref 12–78)
ANION GAP SERPL CALCULATED.3IONS-SCNC: 2 MMOL/L (ref 4–13)
AST SERPL W P-5'-P-CCNC: 18 U/L (ref 5–45)
BACTERIA UR QL AUTO: ABNORMAL /HPF
BILIRUB SERPL-MCNC: 0.44 MG/DL (ref 0.2–1)
BILIRUB UR QL STRIP: NEGATIVE
BUN SERPL-MCNC: 44 MG/DL (ref 5–25)
CALCIUM ALBUM COR SERPL-MCNC: 9.9 MG/DL (ref 8.3–10.1)
CALCIUM SERPL-MCNC: 9.4 MG/DL (ref 8.3–10.1)
CHLORIDE SERPL-SCNC: 106 MMOL/L (ref 100–108)
CHOLEST SERPL-MCNC: 109 MG/DL
CLARITY UR: CLEAR
CO2 SERPL-SCNC: 31 MMOL/L (ref 21–32)
COLOR UR: ABNORMAL
CREAT SERPL-MCNC: 1.43 MG/DL (ref 0.6–1.3)
CREAT UR-MCNC: 91.4 MG/DL
ERYTHROCYTE [DISTWIDTH] IN BLOOD BY AUTOMATED COUNT: 14.2 % (ref 11.6–15.1)
EST. AVERAGE GLUCOSE BLD GHB EST-MCNC: 120 MG/DL
GFR SERPL CREATININE-BSD FRML MDRD: 34 ML/MIN/1.73SQ M
GLUCOSE P FAST SERPL-MCNC: 84 MG/DL (ref 65–99)
GLUCOSE UR STRIP-MCNC: NEGATIVE MG/DL
HBA1C MFR BLD: 5.8 %
HCT VFR BLD AUTO: 39.9 % (ref 34.8–46.1)
HDLC SERPL-MCNC: 46 MG/DL
HGB BLD-MCNC: 12.5 G/DL (ref 11.5–15.4)
HGB UR QL STRIP.AUTO: NEGATIVE
KETONES UR STRIP-MCNC: NEGATIVE MG/DL
LDLC SERPL CALC-MCNC: 45 MG/DL (ref 0–100)
LEUKOCYTE ESTERASE UR QL STRIP: ABNORMAL
MAGNESIUM SERPL-MCNC: 2.4 MG/DL (ref 1.6–2.6)
MCH RBC QN AUTO: 28.4 PG (ref 26.8–34.3)
MCHC RBC AUTO-ENTMCNC: 31.3 G/DL (ref 31.4–37.4)
MCV RBC AUTO: 91 FL (ref 82–98)
NITRITE UR QL STRIP: NEGATIVE
NON-SQ EPI CELLS URNS QL MICRO: ABNORMAL /HPF
NONHDLC SERPL-MCNC: 63 MG/DL
PH UR STRIP.AUTO: 7 [PH]
PHOSPHATE SERPL-MCNC: 3.7 MG/DL (ref 2.3–4.1)
PLATELET # BLD AUTO: 163 THOUSANDS/UL (ref 149–390)
PMV BLD AUTO: 11.5 FL (ref 8.9–12.7)
POTASSIUM SERPL-SCNC: 4.2 MMOL/L (ref 3.5–5.3)
PROT SERPL-MCNC: 7.2 G/DL (ref 6.4–8.2)
PROT UR STRIP-MCNC: NEGATIVE MG/DL
PROT UR-MCNC: 12 MG/DL
PROT/CREAT UR: 0.13 MG/G{CREAT} (ref 0–0.1)
RBC # BLD AUTO: 4.4 MILLION/UL (ref 3.81–5.12)
RBC #/AREA URNS AUTO: ABNORMAL /HPF
SODIUM SERPL-SCNC: 139 MMOL/L (ref 136–145)
SP GR UR STRIP.AUTO: 1.02 (ref 1–1.03)
TRIGL SERPL-MCNC: 92 MG/DL
TSH SERPL DL<=0.05 MIU/L-ACNC: 2.1 UIU/ML (ref 0.45–4.5)
UROBILINOGEN UR STRIP-ACNC: <2 MG/DL
WBC # BLD AUTO: 5.45 THOUSAND/UL (ref 4.31–10.16)
WBC #/AREA URNS AUTO: ABNORMAL /HPF

## 2022-06-24 PROCEDURE — 84156 ASSAY OF PROTEIN URINE: CPT

## 2022-06-24 PROCEDURE — 83735 ASSAY OF MAGNESIUM: CPT

## 2022-06-24 PROCEDURE — 81001 URINALYSIS AUTO W/SCOPE: CPT

## 2022-06-24 PROCEDURE — 82570 ASSAY OF URINE CREATININE: CPT

## 2022-06-24 PROCEDURE — 84100 ASSAY OF PHOSPHORUS: CPT

## 2022-06-24 PROCEDURE — 80061 LIPID PANEL: CPT

## 2022-06-24 PROCEDURE — 80053 COMPREHEN METABOLIC PANEL: CPT

## 2022-06-24 PROCEDURE — 36415 COLL VENOUS BLD VENIPUNCTURE: CPT

## 2022-06-24 PROCEDURE — 83036 HEMOGLOBIN GLYCOSYLATED A1C: CPT

## 2022-06-24 PROCEDURE — 84443 ASSAY THYROID STIM HORMONE: CPT

## 2022-06-24 PROCEDURE — 85027 COMPLETE CBC AUTOMATED: CPT

## 2022-06-24 NOTE — PROGRESS NOTES
[facility]ine [facility]  coding opportunities          Chart Reviewed number of suggestions sent to Provider: 1   [id number] [phone]    Patients Insurance     Medicare Insurance: [employer]

## 2022-06-27 ENCOUNTER — APPOINTMENT (OUTPATIENT)
Dept: PHYSICAL THERAPY | Facility: CLINIC | Age: 82
End: 2022-06-27
Payer: MEDICARE

## 2022-06-28 ENCOUNTER — TELEPHONE (OUTPATIENT)
Dept: CARDIOLOGY CLINIC | Facility: CLINIC | Age: 82
End: 2022-06-28

## 2022-06-30 ENCOUNTER — TELEPHONE (OUTPATIENT)
Dept: OTHER | Facility: HOSPITAL | Age: 82
End: 2022-06-30

## 2022-06-30 ENCOUNTER — APPOINTMENT (OUTPATIENT)
Dept: PHYSICAL THERAPY | Facility: CLINIC | Age: 82
End: 2022-06-30
Payer: MEDICARE

## 2022-06-30 NOTE — TELEPHONE ENCOUNTER
Called patient to review lab work  No answer  Recently Bumex was increased  Creatinine slightly higher maybe a reflection

## 2022-07-01 ENCOUNTER — TELEPHONE (OUTPATIENT)
Dept: NEPHROLOGY | Facility: CLINIC | Age: 82
End: 2022-07-01

## 2022-07-01 ENCOUNTER — OFFICE VISIT (OUTPATIENT)
Dept: FAMILY MEDICINE CLINIC | Facility: CLINIC | Age: 82
End: 2022-07-01
Payer: MEDICARE

## 2022-07-01 VITALS
OXYGEN SATURATION: 97 % | HEART RATE: 56 BPM | DIASTOLIC BLOOD PRESSURE: 60 MMHG | BODY MASS INDEX: 30.21 KG/M2 | WEIGHT: 160 LBS | SYSTOLIC BLOOD PRESSURE: 116 MMHG | HEIGHT: 61 IN | RESPIRATION RATE: 16 BRPM

## 2022-07-01 DIAGNOSIS — E78.5 HYPERLIPIDEMIA, UNSPECIFIED HYPERLIPIDEMIA TYPE: ICD-10-CM

## 2022-07-01 DIAGNOSIS — F41.9 ANXIETY: ICD-10-CM

## 2022-07-01 DIAGNOSIS — I50.32 CHRONIC DIASTOLIC (CONGESTIVE) HEART FAILURE (HCC): ICD-10-CM

## 2022-07-01 DIAGNOSIS — N18.31 STAGE 3A CHRONIC KIDNEY DISEASE (HCC): Primary | ICD-10-CM

## 2022-07-01 DIAGNOSIS — R73.03 PREDIABETES: ICD-10-CM

## 2022-07-01 DIAGNOSIS — E03.9 HYPOTHYROIDISM, UNSPECIFIED TYPE: Chronic | ICD-10-CM

## 2022-07-01 DIAGNOSIS — Z00.00 MEDICARE ANNUAL WELLNESS VISIT, SUBSEQUENT: Primary | ICD-10-CM

## 2022-07-01 PROBLEM — R45.89 DEPRESSED MOOD: Status: RESOLVED | Noted: 2021-07-01 | Resolved: 2022-07-01

## 2022-07-01 PROBLEM — I61.9 INTRAPARENCHYMAL HEMORRHAGE OF BRAIN (HCC): Status: RESOLVED | Noted: 2020-02-10 | Resolved: 2022-07-01

## 2022-07-01 PROCEDURE — 99215 OFFICE O/P EST HI 40 MIN: CPT | Performed by: FAMILY MEDICINE

## 2022-07-01 PROCEDURE — G0439 PPPS, SUBSEQ VISIT: HCPCS | Performed by: FAMILY MEDICINE

## 2022-07-01 RX ORDER — MIRTAZAPINE 7.5 MG/1
7.5 TABLET, FILM COATED ORAL
Qty: 30 TABLET | Refills: 1 | Status: SHIPPED | OUTPATIENT
Start: 2022-07-01 | End: 2022-08-19

## 2022-07-01 RX ORDER — LEVOTHYROXINE SODIUM 88 UG/1
88 TABLET ORAL DAILY
Qty: 90 TABLET | Refills: 1 | Status: SHIPPED | OUTPATIENT
Start: 2022-07-01

## 2022-07-01 RX ORDER — ATORVASTATIN CALCIUM 40 MG/1
40 TABLET, FILM COATED ORAL EVERY EVENING
Qty: 90 TABLET | Refills: 1 | Status: SHIPPED | OUTPATIENT
Start: 2022-07-01

## 2022-07-01 NOTE — PATIENT INSTRUCTIONS
Medicare Preventive Visit Patient Instructions  Thank you for completing your Welcome to Medicare Visit or Medicare Annual Wellness Visit today  Your next wellness visit will be due in one year (7/2/2023)  The screening/preventive services that you may require over the next 5-10 years are detailed below  Some tests may not apply to you based off risk factors and/or age  Screening tests ordered at today's visit but not completed yet may show as past due  Also, please note that scanned in results may not display below  Preventive Screenings:  Service Recommendations Previous Testing/Comments   Colorectal Cancer Screening  * Colonoscopy    * Fecal Occult Blood Test (FOBT)/Fecal Immunochemical Test (FIT)  * Fecal DNA/Cologuard Test  * Flexible Sigmoidoscopy Age: 54-65 years old   Colonoscopy: every 10 years (may be performed more frequently if at higher risk)  OR  FOBT/FIT: every 1 year  OR  Cologuard: every 3 years  OR  Sigmoidoscopy: every 5 years  Screening may be recommended earlier than age 48 if at higher risk for colorectal cancer  Also, an individualized decision between you and your healthcare provider will decide whether screening between the ages of 74-80 would be appropriate  Colonoscopy: Not on file  FOBT/FIT: Not on file  Cologuard: Not on file  Sigmoidoscopy: Not on file          Breast Cancer Screening Age: 36 years old  Frequency: every 1-2 years  Not required if history of left and right mastectomy Mammogram: 05/23/2017        Cervical Cancer Screening Between the ages of 21-29, pap smear recommended once every 3 years  Between the ages of 33-67, can perform pap smear with HPV co-testing every 5 years     Recommendations may differ for women with a history of total hysterectomy, cervical cancer, or abnormal pap smears in past  Pap Smear: Not on file    Screening Not Indicated   Hepatitis C Screening Once for adults born between [facility]  More frequently in patients at high risk for Hepatitis C Hep C Antibody: Not on file        Diabetes Screening 1-2 times per year if you're at risk for diabetes or have pre-diabetes Fasting glucose: 84 mg/dL   A1C: 5 8 %    Screening Current   Cholesterol Screening Once every 5 years if you don't have a lipid disorder  May order more often based on risk factors  Lipid panel: 06/24/2022    Screening Not Indicated  History Lipid Disorder     Other Preventive Screenings Covered by Medicare:  1  Abdominal Aortic Aneurysm (AAA) Screening: covered once if your at risk  You're considered to be at risk if you have a family history of AAA  2  Lung Cancer Screening: covers low dose CT scan once per year if you meet all of the following conditions: (1) Age 50-69; (2) No signs or symptoms of lung cancer; (3) Current smoker or have quit smoking within the last 15 years; (4) You have a tobacco smoking history of at least 30 pack years (packs per day multiplied by number of years you smoked); (5) You get a written order from a healthcare provider  3  Glaucoma Screening: covered annually if you're considered high risk: (1) You have diabetes OR (2) Family history of glaucoma OR (3)  aged 48 and older OR (3)  American aged 72 and older  3  Osteoporosis Screening: covered every 2 years if you meet one of the following conditions: (1) You're estrogen deficient and at risk for osteoporosis based off medical history and other findings; (2) Have a vertebral abnormality; (3) On glucocorticoid therapy for more than 3 months; (4) Have primary hyperparathyroidism; (5) On osteoporosis medications and need to assess response to drug therapy  · Last bone density test (DXA Scan): 04/02/2019   5  HIV Screening: covered annually if you're between the age of 15-65  Also covered annually if you are younger than 13 and older than 72 with risk factors for HIV infection  For pregnant patients, it is covered up to 3 times per pregnancy      Immunizations:  Immunization Recommendations   Influenza Vaccine Annual influenza vaccination during flu season is recommended for all persons aged >= 6 months who do not have contraindications   Pneumococcal Vaccine (Prevnar and Pneumovax)  * Prevnar = PCV13  * Pneumovax = PPSV23   Adults 25-60 years old: 1-3 doses may be recommended based on certain risk factors  Adults 72 years old: Prevnar (PCV13) vaccine recommended followed by Pneumovax (PPSV23) vaccine  If already received PPSV23 since turning 65, then PCV13 recommended at least one year after PPSV23 dose  Hepatitis B Vaccine 3 dose series if at intermediate or high risk (ex: diabetes, end stage renal disease, liver disease)   Tetanus (Td) Vaccine - COST NOT COVERED BY MEDICARE PART B Following completion of primary series, a booster dose should be given every 10 years to maintain immunity against tetanus  Td may also be given as tetanus wound prophylaxis  Tdap Vaccine - COST NOT COVERED BY MEDICARE PART B Recommended at least once for all adults  For pregnant patients, recommended with each pregnancy  Shingles Vaccine (Shingrix) - COST NOT COVERED BY MEDICARE PART B  2 shot series recommended in those aged 48 and above     Health Maintenance Due:  There are no preventive care reminders to display for this patient  Immunizations Due:      Topic Date Due    COVID-19 Vaccine (4 - Booster for Borderfree series) 03/27/2022    Influenza Vaccine (1) 09/01/2022     Advance Directives   What are advance directives? Advance directives are legal documents that state your wishes and plans for medical care  These plans are made ahead of time in case you lose your ability to make decisions for yourself  Advance directives can apply to any medical decision, such as the treatments you want, and if you want to donate organs  What are the types of advance directives? There are many types of advance directives, and each state has rules about how to use them   You may choose a combination of any of the following:  · Living will: This is a written record of the treatment you want  You can also choose which treatments you do not want, which to limit, and which to stop at a certain time  This includes surgery, medicine, IV fluid, and tube feedings  · Durable power of  for healthcare [facility]): This is a written record that states who you want to make healthcare choices for you when you are unable to make them for yourself  This person, called a proxy, is usually a family member or a friend  You may choose more than 1 proxy  · Do not resuscitate (DNR) order:  A DNR order is used in case your heart stops beating or you stop breathing  It is a request not to have certain forms of treatment, such as CPR  A DNR order may be included in other types of advance directives  · Medical directive: This covers the care that you want if you are in a coma, near death, or unable to make decisions for yourself  You can list the treatments you want for each condition  Treatment may include pain medicine, surgery, blood transfusions, dialysis, IV or tube feedings, and a ventilator (breathing machine)  · Values history: This document has questions about your views, beliefs, and how you feel and think about life  This information can help others choose the care that you would choose  Why are advance directives important? An advance directive helps you control your care  Although spoken wishes may be used, it is better to have your wishes written down  Spoken wishes can be misunderstood, or not followed  Treatments may be given even if you do not want them  An advance directive may make it easier for your family to make difficult choices about your care  Fall Prevention    Fall prevention  includes ways to make your home and other areas safer  It also includes ways you can move more carefully to prevent a fall   Health conditions that cause changes in your blood pressure, vision, or muscle strength and coordination may increase your risk for falls  Medicines may also increase your risk for falls if they make you dizzy, weak, or sleepy  Fall prevention tips:   · Stand or sit up slowly  · Use assistive devices as directed  · Wear shoes that fit well and have soles that   · Wear a personal alarm  · Stay active  · Manage your medical conditions  Home Safety Tips:  · Add items to prevent falls in the bathroom  · Keep paths clear  · Install bright lights in your home  · Keep items you use often on shelves within reach  · Paint or place reflective tape on the edges of your stairs  Weight Management   Why it is important to manage your weight:  Being overweight increases your risk of health conditions such as heart disease, high blood pressure, type 2 diabetes, and certain types of cancer  It can also increase your risk for osteoarthritis, sleep apnea, and other respiratory problems  Aim for a slow, steady weight loss  Even a small amount of weight loss can lower your risk of health problems  How to lose weight safely:  A safe and healthy way to lose weight is to eat fewer calories and get regular exercise  You can lose up about 1 pound a week by decreasing the number of calories you eat by 500 calories each day  Healthy meal plan for weight management:  A healthy meal plan includes a variety of foods, contains fewer calories, and helps you stay healthy  A healthy meal plan includes the following:  · Eat whole-grain foods more often  A healthy meal plan should contain fiber  Fiber is the part of grains, fruits, and vegetables that is not broken down by your body  Whole-grain foods are healthy and provide extra fiber in your diet  Some examples of whole-grain foods are whole-wheat breads and pastas, oatmeal, brown rice, and bulgur  · Eat a variety of vegetables every day  Include dark, leafy greens such as spinach, kale, elijah greens, and mustard greens   Eat yellow and orange vegetables such as carrots, sweet potatoes, and winter squash  · Eat a variety of fruits every day  Choose fresh or canned fruit (canned in its own juice or light syrup) instead of juice  Fruit juice has very little or no fiber  · Eat low-fat dairy foods  Drink fat-free (skim) milk or 1% milk  Eat fat-free yogurt and low-fat cottage cheese  Try low-fat cheeses such as mozzarella and other reduced-fat cheeses  · Choose meat and other protein foods that are low in fat  Choose beans or other legumes such as split peas or lentils  Choose fish, skinless poultry (chicken or turkey), or lean cuts of red meat (beef or pork)  Before you cook meat or poultry, cut off any visible fat  · Use less fat and oil  Try baking foods instead of frying them  Add less fat, such as margarine, sour cream, regular salad dressing and mayonnaise to foods  Eat fewer high-fat foods  Some examples of high-fat foods include french fries, doughnuts, ice cream, and cakes  · Eat fewer sweets  Limit foods and drinks that are high in sugar  This includes candy, cookies, regular soda, and sweetened drinks  Exercise:  Exercise at least 30 minutes per day on most days of the week  Some examples of exercise include walking, biking, dancing, and swimming  You can also fit in more physical activity by taking the stairs instead of the elevator or parking farther away from stores  Ask your healthcare provider about the best exercise plan for you  © Copyright [employer] 2018 Information is for End User's use only and may not be sold, redistributed or otherwise used for commercial purposes   All illustrations and images included in CareNotes® are the copyrighted property of [employer] M [employer]  or [address] [place] [address] [facility]

## 2022-07-01 NOTE — ASSESSMENT & PLAN NOTE
Patient discontinue Cymbalta  Reporting emotional instability  Denies symptoms of depression  Also experiencing insomnia  Started on low dose of Remeron for bedtime use  Will follow up in 4 to 6 weeks for reassessment

## 2022-07-01 NOTE — TELEPHONE ENCOUNTER
Spoke to pt- she feels ok, bp has been good  She states she takes Bumex the same way as before, 2 mg daily  BMP mailed to pt

## 2022-07-01 NOTE — RESULT ENCOUNTER NOTE
Hello    Patient normally is followed up by Ms Jayla Glover  Can you please let the patient know that the creatinine is above baseline 1 4 mg/dL  Electrolytes are relatively stable  I noted that the patient had increase in Bumex recently at the cardiologist office  There slightly higher creatinine could be a reflection of more fluid coming off    Therefore as long as the patient is feeling well and blood pressures are stable with systolic pressures less than 140, no changes for now, otherwise please let me know  -please ask the patient to check BMP in 1-2 weeks I tried  -to call the patient yesterday but no answer    Thank you    np

## 2022-07-01 NOTE — PROGRESS NOTES
Assessment and Plan:     Problem List Items Addressed This Visit    None       1  Medicare annual wellness visit, subsequent  Assessment & Plan:  UTD      2  Hypothyroidism, unspecified type  Assessment & Plan:  TSH stable  Continue levothyroxine 88 mcg     Orders:  -     levothyroxine 88 mcg tablet; Take 1 tablet (88 mcg total) by mouth daily  -     TSH, 3rd generation with Free T4 reflex; Future; Expected date: 11/28/2022    3  Anxiety  Assessment & Plan:  Patient discontinue Cymbalta  Reporting emotional instability  Denies symptoms of depression  Also experiencing insomnia  Started on low dose of Remeron for bedtime use  Will follow up in 4 to 6 weeks for reassessment  Orders:  -     mirtazapine (REMERON) 7 5 MG tablet; Take 1 tablet (7 5 mg total) by mouth daily at bedtime    4  Hyperlipidemia, unspecified hyperlipidemia type  Assessment & Plan:  LDL is at goal   Continue atorvastatin 40 mg  Metabolic labs/follow-up 6 month interval    Orders:  -     atorvastatin (LIPITOR) 40 mg tablet; Take 1 tablet (40 mg total) by mouth every evening  -     Lipid panel; Future; Expected date: 11/28/2022    5  Prediabetes  Assessment & Plan:  Reviewed with patient today  A1c stable at 5 8  Encouraged to continue with low carb diet  Continue monitoring  Orders:  -     Comprehensive metabolic panel; Future; Expected date: 11/28/2022  -     Hemoglobin A1C; Future; Expected date: 11/28/2022    6  Chronic diastolic (congestive) heart failure [facility])  Assessment & Plan:  Wt Readings from Last 3 Encounters:   07/01/22 72 6 kg (160 lb)   06/20/22 75 3 kg (166 lb)   05/27/22 74 8 kg (165 lb)     Asymptomatic  Continue monitoring/management per Cardiology  BMI Counseling: Body mass index is 30 23 kg/m²  The BMI is above normal  Nutrition recommendations include decreasing portion sizes, encouraging healthy choices of fruits and vegetables and decreasing fast food intake   Exercise recommendations include moderate physical activity 150 minutes/week  No pharmacotherapy was ordered  Rationale for BMI follow-up plan is due to patient being overweight or obese  Depression Screening and Follow-up Plan: Patient was screened for depression during today's encounter  They screened negative with a PHQ-2 score of 2  Falls Plan of Care: balance, strength, and gait training instructions were provided  Preventive health issues were discussed with patient, and age appropriate screening tests were ordered as noted in patient's After Visit Summary  Personalized health advice and appropriate referrals for health education or preventive services given if needed, as noted in patient's After Visit Summary  History of Present Illness:     Patient presents for a Medicare Wellness Visit    Thyroid Problem  Presents for follow-up visit  Patient reports no depressed mood, fatigue, palpitations or tremors  The symptoms have been stable (Levothyroxine 88 microgram )  Her past medical history is significant for hyperlipidemia  Hyperlipidemia  This is a chronic problem  The current episode started more than 1 year ago  The problem is controlled  Recent lipid tests were reviewed and are normal  Pertinent negatives include no chest pain, myalgias or shortness of breath  Current antihyperlipidemic treatment includes statins  The current treatment provides significant improvement of lipids  There are no compliance problems  Risk factors for coronary artery disease include dyslipidemia, hypertension and post-menopausal    Anxiety  Presents for initial visit  Onset was 1 to 6 months ago  The problem has been unchanged  Symptoms include excessive worry  Patient reports no chest pain, depressed mood, palpitations or shortness of breath  Symptoms occur most days  The severity of symptoms is causing significant distress and interfering with daily activities  Nothing aggravates the symptoms  The quality of sleep is poor   Nighttime awakenings: one to two  There are no known risk factors  Past treatments include lifestyle changes  The treatment provided no relief  Patient use to take Cymbalta which she discontinued a few weeks ago  Patient was reporting symptoms of unsteady gait  Continues to experience significant emotional disturbance and insomnia  Patient has history of CKD, renal function stable  Being monitored by Nephro  Follows up with Cardiology for CHF  Denies any symptoms of chest pain or shortness of breath  Patient Care Team:  Alonzo Diaz MD as PCP - MD Chel Aguillon MD Dario Greet, MD Deloria Brunette, MD Jerelyn Karst, MD as Endoscopist     Review of Systems:     Review of Systems   Constitutional: Negative  Negative for fatigue  Respiratory: Negative  Negative for shortness of breath  Cardiovascular: Negative  Negative for chest pain and palpitations  Musculoskeletal: Negative for myalgias  Neurological: Negative  Negative for tremors  Psychiatric/Behavioral: Negative           Problem List:     Patient Active Problem List   Diagnosis    Benign essential hypertension    Chronic diastolic (congestive) heart failure (HCC)    Hypothyroidism    Arthropathy of knee    Hallux abductovalgus with bunions, unspecified laterality    CKD (chronic kidney disease), stage III ([facility] )    Diverticulitis of colon    Chronic gout without tophus    Hiatal hernia    Hyperlipemia    Hyperuricemia    Morbid obesity with body mass index of 40 0-44 9 in adult ([facility] )    Nephrolithiasis    WENDI (obstructive sleep apnea)    Pseudogout of left wrist    Gastroesophageal reflux disease without esophagitis    Exertional shortness of breath    Medicare annual wellness visit, subsequent    Intraparenchymal hemorrhage of brain ([facility] )    Prediabetes    Status post right frontotemporal replacement cranioplasty    BMI 38 0-38 9,adult    Sciatica of left side    Spinal stenosis of lumbar region with neurogenic claudication    Depressed mood      Past Medical and Surgical History:     Past Medical History:   Diagnosis Date    Anxiety     Arthritis     joints    Cataract     Disease of thyroid gland     Eczema     GERD (gastroesophageal reflux disease)     Gout     Heart failure (HCC)     Hepatitis     Hiatal hernia     Hypertension     Onychomycosis     last assessed: 16    Orthopnea     resolved: 16    Pseudogout of left wrist     Sleep apnea     wears CPAP    Stroke [facility])      Past Surgical History:   Procedure Laterality Date    ABDOMINAL SURGERY          BRAIN HEMATOMA EVACUATION Right 2020    Procedure: Right decompressive craniectomy and evacuation of intraparenchymal hematoma; Surgeon: Anuel Pinto MD;  [facility];  Service: Neurosurgery    BREAST SURGERY Right     cyst removal    CARPAL TUNNEL RELEASE Left     CARPAL TUNNEL RELEASE Right     CATARACT EXTRACTION       SECTION  1960    x1    COLONOSCOPY N/A 2018    Procedure: COLONOSCOPY;  Surgeon: Silvana Lemon MD;  Location: [facility] GI LAB; Service: Gastroenterology    DILATION AND CURETTAGE OF UTERUS  1967    EYE SURGERY Left 2006    cataracts    HERNIA REPAIR      umbilical hernia    INCISIONAL HERNIA REPAIR      incarcerated    KNEE ARTHROSCOPY Right     CA REPLACE SKULL PLATE/FLAP Right 2/3/2111    Procedure: right frontotemporal replacement cranioplasty;  Surgeon: Anuel Pinto MD;  [facility];  Service: Neurosurgery    CA XCAPSL CTRC RMVL INSJ IO LENS PROSTH W/O ECP Right 3/27/2017    Procedure: EXTRACTION EXTRACAPSULAR CATARACT PHACO INTRAOCULAR LENS (IOL);   Surgeon: Edilma Adair MD;  Location: [facility] MAIN OR;  Service: Ophthalmology      Family History:     Family History   Problem Relation Age of Onset    Diabetes Mother     Coronary artery disease Mother     Arthritis Mother     Hypertension Mother    Sandro William Hypertension Father     Diabetes Brother     Diabetes Son     Arthritis Family       Social History:     Social History     Socioeconomic History    Marital status:      Spouse name: Not on file    Number of children: Not on file    Years of education: Not on file    Highest education level: Not on file   Occupational History    Not on file   Tobacco Use    Smoking status: Never Smoker    Smokeless tobacco: Never Used   Vaping Use    Vaping Use: Never used   Substance and Sexual Activity    Alcohol use: Yes     Alcohol/week: 0 0 standard drinks     Comment: Rare      Drug use: Never    Sexual activity: Not Currently     Birth control/protection: Post-menopausal   Other Topics Concern    Not on file   Social History Narrative    Daily coffee consumption    Lack of exercise    Sleeps 6-7 hours a day     Social Determinants of Health     Financial Resource Strain: Not on file   Food Insecurity: Not on file   Transportation Needs: Not on file   Physical Activity: Not on file   Stress: Not on file   Social Connections: Not on file   Intimate Partner Violence: Not on file   Housing Stability: Not on file      Medications and Allergies:     Current Outpatient Medications   Medication Sig Dispense Refill    amLODIPine (NORVASC) 10 mg tablet Take 0 5 tablets (5 mg total) by mouth daily 90 tablet 1    aspirin (ECOTRIN LOW STRENGTH) 81 mg EC tablet Take 1 tablet (81 mg total) by mouth daily 90 tablet 3    atorvastatin (LIPITOR) 40 mg tablet Take 1 tablet (40 mg total) by mouth every evening 90 tablet 1    bumetanide (BUMEX) 2 mg tablet Take 1 tablet (2 mg total) by mouth daily 135 tablet 3    clopidogrel (Plavix) 75 mg tablet Take 1 tablet (75 mg total) by mouth daily 90 tablet 1    Diclofenac Sodium (VOLTAREN) 1 % Apply 2 g topically 4 (four) times a day 100 g 0    DULoxetine (CYMBALTA) 20 mg capsule Take 1 capsule (20 mg total) by mouth daily 90 capsule 0    levothyroxine 88 mcg tablet Take 1 tablet (88 mcg total) by mouth daily 90 tablet 0    losartan (COZAAR) 25 mg tablet Take 2 tab in AM and 1 tab in  tablet 3    metoprolol tartrate (LOPRESSOR) 25 mg tablet Take 0 5 tablets (12 5 mg total) by mouth every 12 (twelve) hours 60 tablet 2    pantoprazole (PROTONIX) 20 mg tablet Take 1 tablet (20 mg total) by mouth daily 30 tablet 1    spironolactone (ALDACTONE) 25 mg tablet Take 0 5 tablets (12 5 mg total) by mouth daily 45 tablet 3     No current facility-administered medications for this visit  No Known Allergies   Immunizations:     Immunization History   Administered Date(s) Administered    COVID-19 PFIZER VACCINE 0 3 ML IM 01/31/2021, 02/20/2021, 11/27/2021    Influenza Split High Dose Preservative Free IM 10/09/2012, 09/24/2013, 09/09/2014, 10/02/2015, 09/23/2016, 10/24/2017    Influenza, high dose seasonal 0 7 mL 10/05/2018, 10/08/2020    Influenza, seasonal, injectable 10/25/2001, 10/28/2005, 10/10/2006, 10/12/2007, 10/14/2008, 09/17/2010, 09/23/2011    Pneumococcal Conjugate 13-Valent 11/15/2017    Pneumococcal Polysaccharide PPV23 10/10/2006, 07/10/2020    Zoster 11/30/2017    influenza, trivalent, adjuvanted 10/09/2019      Health Maintenance: There are no preventive care reminders to display for this patient  Topic Date Due    COVID-19 Vaccine (4 - Booster for Pfizer series) 03/27/2022    Influenza Vaccine (1) 09/01/2022      Medicare Screening Tests and Risk Assessments:     Sobeida No is here for her Subsequent Wellness visit  Last Medicare Wellness visit information reviewed, patient interviewed, no change since last AWV  Health Risk Assessment:   Patient rates overall health as good  Patient feels that their physical health rating is same  Patient is satisfied with their life  Eyesight was rated as slightly worse  Hearing was rated as same  Patient feels that their emotional and mental health rating is slightly worse   Patients states they are never, rarely angry  Patient states they are sometimes unusually tired/fatigued  Pain experienced in the last 7 days has been some  Patient's pain rating has been 5/10  Patient states that she has experienced no weight loss or gain in last 6 months  Depression Screening:   PHQ-2 Score: 2      Fall Risk Screening: In the past year, patient has experienced: history of falling in past year      Urinary Incontinence Screening:   Patient has not leaked urine accidently in the last six months  Home Safety:  Patient does not have trouble with stairs inside or outside of their home  Patient has working smoke alarms and has working carbon monoxide detector  Home safety hazards include: none  Nutrition:   Current diet is Regular  Medications:   Patient is not currently taking any over-the-counter supplements  Patient is able to manage medications  Activities of Daily Living (ADLs)/Instrumental Activities of Daily Living (IADLs):   Walk and transfer into and out of bed and chair?: Yes  Dress and groom yourself?: Yes    Bathe or shower yourself?: Yes    Feed yourself? 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 Equipment Suppliers  Urgent Career    Previous Hospitalizations:   Any hospitalizations or ED visits within the last 12 months?: No      Advance Care Planning:   Living will: Yes    Durable POA for healthcare:  Yes    Advanced directive: No      Cognitive Screening:   Provider or family/friend/caregiver concerned regarding cognition?: No    PREVENTIVE SCREENINGS      Cardiovascular Screening:    General: Screening Not Indicated and History Lipid Disorder      Diabetes Screening:     General: Screening Current      Cervical Cancer Screening:    General: Screening Not Indicated      Osteoporosis Screening:    General: Screening Not Indicated and Risks and Benefits Discussed      Abdominal Aortic Aneurysm (AAA) Screening:        General: Screening Not Indicated      Lung Cancer Screening:     General: Screening Not Indicated    Screening, Brief Intervention, and Referral to Treatment (SBIRT)    Screening  Typical number of drinks in a day: 0  Typical number of drinks in a week: 0  Interpretation: Low risk drinking behavior  AUDIT-C Screenin) How often did you have a drink containing alcohol in the past year? monthly or less  2) How many drinks did you have on a typical day when you were drinking in the past year? 1 to 2  3) How often did you have 6 or more drinks on one occasion in the past year? never    AUDIT-C Score: 1  Interpretation: Score 0-2 (female): Negative screen for alcohol misuse    Single Item Drug Screening:  How often have you used an illegal drug (including marijuana) or a prescription medication for non-medical reasons in the past year? never    Single Item Drug Screen Score: 0  Interpretation: Negative screen for possible drug use disorder    Other Counseling Topics:   Skin self-exam, sunscreen and calcium and vitamin D intake and regular weightbearing exercise  No exam data present     Physical Exam:     There were no vitals taken for this visit  Physical Exam  Vitals and nursing note reviewed  Constitutional:       General: She is not in acute distress  Appearance: She is well-developed  HENT:      Head: Normocephalic and atraumatic  Eyes:      Conjunctiva/sclera: Conjunctivae normal    Cardiovascular:      Rate and Rhythm: Normal rate and regular rhythm  Heart sounds: No murmur heard  Pulmonary:      Effort: Pulmonary effort is normal  No respiratory distress  Breath sounds: Normal breath sounds  Abdominal:      Palpations: Abdomen is soft  Tenderness: There is no abdominal tenderness  Musculoskeletal:      Cervical back: Neck supple  Skin:     General: Skin is warm and dry     Neurological:      Mental Status: She is alert            Hakeem Espinosa MD

## 2022-07-01 NOTE — ASSESSMENT & PLAN NOTE
Reviewed with patient today  A1c stable at 5 8  Encouraged to continue with low carb diet  Continue monitoring

## 2022-07-01 NOTE — TELEPHONE ENCOUNTER
----- Message from Joanie Beltran MD sent at 7/1/2022  8:21 AM EDT -----  Hello    Patient normally is followed up by Ms Maggie Valencia  Can you please let the patient know that the creatinine is above baseline 1 4 mg/dL  Electrolytes are relatively stable  I noted that the patient had increase in Bumex recently at the cardiologist office  There slightly higher creatinine could be a reflection of more fluid coming off    Therefore as long as the patient is feeling well and blood pressures are stable with systolic pressures less than 140, no changes for now, otherwise please let me know  -please ask the patient to check BMP in 1-2 weeks I tried  -to call the patient yesterday but no answer    Thank you    np

## 2022-07-05 ENCOUNTER — TELEPHONE (OUTPATIENT)
Dept: FAMILY MEDICINE CLINIC | Facility: CLINIC | Age: 82
End: 2022-07-05

## 2022-07-05 NOTE — TELEPHONE ENCOUNTER
Daughter called and states the Remeron is making her very fatigued and nauseous  I did explains that the first 1-2 weeks cause this but it will subside  Pt does not want to take and she states she does not want anything else    Just wanted to let you know

## 2022-07-11 ENCOUNTER — CLINICAL SUPPORT (OUTPATIENT)
Dept: CARDIOLOGY CLINIC | Facility: CLINIC | Age: 82
End: 2022-07-11
Payer: MEDICARE

## 2022-07-11 ENCOUNTER — TELEPHONE (OUTPATIENT)
Dept: CARDIOLOGY CLINIC | Facility: CLINIC | Age: 82
End: 2022-07-11

## 2022-07-11 DIAGNOSIS — I49.1 PAC (PREMATURE ATRIAL CONTRACTION): ICD-10-CM

## 2022-07-11 DIAGNOSIS — I34.0 MODERATE TO SEVERE MITRAL REGURGITATION: ICD-10-CM

## 2022-07-11 DIAGNOSIS — Z79.01 CURRENT USE OF LONG TERM ANTICOAGULATION: ICD-10-CM

## 2022-07-11 DIAGNOSIS — I48.0 PAROXYSMAL ATRIAL FIBRILLATION (HCC): ICD-10-CM

## 2022-07-11 PROCEDURE — 93244 EXT ECG>48HR<7D REV&INTERPJ: CPT | Performed by: INTERNAL MEDICINE

## 2022-07-21 ENCOUNTER — TELEPHONE (OUTPATIENT)
Dept: CARDIOLOGY CLINIC | Facility: CLINIC | Age: 82
End: 2022-07-21

## 2022-07-24 DIAGNOSIS — I48.0 PAROXYSMAL ATRIAL FIBRILLATION (HCC): ICD-10-CM

## 2022-07-24 DIAGNOSIS — I49.1 PAC (PREMATURE ATRIAL CONTRACTION): ICD-10-CM

## 2022-07-25 RX ORDER — PANTOPRAZOLE SODIUM 20 MG/1
TABLET, DELAYED RELEASE ORAL
Qty: 30 TABLET | Refills: 5 | Status: CANCELLED | OUTPATIENT
Start: 2022-07-25

## 2022-07-25 RX ORDER — PANTOPRAZOLE SODIUM 20 MG/1
TABLET, DELAYED RELEASE ORAL
Qty: 30 TABLET | Refills: 5 | Status: SHIPPED | OUTPATIENT
Start: 2022-07-25 | End: 2022-10-03 | Stop reason: SDUPTHER

## 2022-07-28 NOTE — PROGRESS NOTES
Patient choosing to self-d/c despite maximal education on benefits of PT services  D/C with simple HEP

## 2022-08-04 ENCOUNTER — OFFICE VISIT (OUTPATIENT)
Dept: CARDIOLOGY CLINIC | Facility: CLINIC | Age: 82
End: 2022-08-04
Payer: MEDICARE

## 2022-08-04 VITALS
TEMPERATURE: 98.2 F | SYSTOLIC BLOOD PRESSURE: 122 MMHG | BODY MASS INDEX: 31.15 KG/M2 | OXYGEN SATURATION: 98 % | HEIGHT: 61 IN | DIASTOLIC BLOOD PRESSURE: 62 MMHG | WEIGHT: 165 LBS | HEART RATE: 82 BPM

## 2022-08-04 DIAGNOSIS — R06.02 EXERTIONAL SHORTNESS OF BREATH: ICD-10-CM

## 2022-08-04 DIAGNOSIS — I10 BENIGN ESSENTIAL HYPERTENSION: ICD-10-CM

## 2022-08-04 DIAGNOSIS — I34.0 MODERATE TO SEVERE MITRAL REGURGITATION: ICD-10-CM

## 2022-08-04 DIAGNOSIS — I50.32 CHRONIC DIASTOLIC (CONGESTIVE) HEART FAILURE (HCC): ICD-10-CM

## 2022-08-04 DIAGNOSIS — I49.1 PAC (PREMATURE ATRIAL CONTRACTION): ICD-10-CM

## 2022-08-04 DIAGNOSIS — I48.0 PAROXYSMAL ATRIAL FIBRILLATION (HCC): Primary | ICD-10-CM

## 2022-08-04 PROCEDURE — 99214 OFFICE O/P EST MOD 30 MIN: CPT | Performed by: INTERNAL MEDICINE

## 2022-08-04 PROCEDURE — 93000 ELECTROCARDIOGRAM COMPLETE: CPT | Performed by: INTERNAL MEDICINE

## 2022-08-04 NOTE — PROGRESS NOTES
Cardiology Follow Up  Dionicio [facility]  1940  [id number]  [facility] AND [facility] PROFESSIONAL PLAZA  [facility]  [address] [address] [id number]    1  Paroxysmal atrial fibrillation (HCC)  POCT ECG   2  PAC (premature atrial contraction)  POCT ECG   3  Benign essential hypertension  POCT ECG   4  Chronic diastolic (congestive) heart failure (HCC)  POCT ECG      Discussion/Plan:  Atrial fibrillation persistent- concern for tachy-hola  Event monitor with periods of AFib with RVR rest   Also with nonsustained ventricular tachycardia  metoprolol 25 mg twice a day  We will recheck the 3 day event monitor to rule out tachy-hola response/  She has a high chads Vasc score given her age of 79, woman gender, hypertension, and previous hypertensive stroke  However she has a significant fall risk  She has had no further falls  She has tolerated aspirin and clopidogrel  We will now challenge her on Eliquis 2 5 mg twice a day  Given her age greater than [de-identified] and her creatinine near 1 5 + history of prior hemorrhagic bleeding- will choose a lower dose  Moderate to severe mitral regurgitation- blood pressure optimized  Continue amlodipine 10 mg  Losartan 50 mg morning and 50mgh in evening  Bumex 2 mg daily  We discussed about tight blood pressure control  She will continue with a low-salt diet  Her blood pressures have been optimized  Possible consideration for MitraClip evaluation  IF worsens or symptoms, plan to consider mitral clip    Intraparenchymal bleed/cranioplasty- previous history which makes her at higher risk for anticoagulation usage  Acute on chronic diastolic heart failure with a component of lymphedema- improved volume status   Lower ext better  - Weight loss  - Compression socks + wedge pillow  - Increase bumex 2mg daily +  - Low-salt diet   - Elevation of legs + walking  -- if with continued lower extremity edema consideration for compression boots    Chronic dyspnea-I believe this is multifactorial including chronic deconditioning from severe back pain  BMI of 41  Some retained lower extremity edema  Her PFTs were reviewed which also show some restriction  She needs to started structured exercise program   Possible swimming or water sports given her severe back discomfort     Sleep apnea  -- cpap mask titration    Incomplete rbbb    Acute on chronic kidney disease follow-up with renal  - continue cozaar +  - Bumex 2mg daily    Triglycerides- controlled  - 4000 mg omega-3 daily    WENDI- on cpap      Interval History:  She denies having chest pain  Her edema is better  Taking omega 3 currently  No bleeding or bruising  No dizziness or light-headness  Blood pressure very well controlled  Has back pain  12/11/2018:  She reports having some exertional shortness of breath  Her lower extremity edema has improved but is persistent  She denies feeling dizziness or lightheadedness  She denies having any falls  She denies having any bleeding or bruising     06/12/2019:  She reports some improvement in her shortness of breath but still has dyspnea while sitting  She is unable to walk secondary to severe back pain  She denies feeling dizziness or lightheadedness  Her lower extremity edema has improved  She is trying a elevator legs  She does wear compression socks  She is compliant with CPAP     31/20: [de-identified]year-old with recent intraparenchymal hemorrhage status post right decompressive craniectomy and evacuation of intraparenchymal hematoma  She is pending follow-up bone flap by neuro surgery  She tolerated her previous procedure without evidence of decompensated heart failure  She was noted to have some bradycardia and her beta-blocker was discontinued  She is compliant with her antihypertensive medications  She is compliant with her diuretics    She denies having significant worsening in her chronic dyspnea or lower extremity edema    02/24/2021:  She denies currently feeling significant shortness of breath  She reports her lower extremity swelling is well controlled  We reviewed through her recent EKG  No prior history of atrial fibrillation  She is compliant with her medications  Denies having any falls  06/07/2021:  Her weight has been stable  She denies having major lower extremity swelling  We reviewed through her last echocardiogram   Reviewed through her Holter monitor which did not show evidence of atrial fibrillation or atrial flutter  She is compliant with her CPAP  She is compliant with her diuretics  Reviewed her last lab work  01/21/2022:  Her blood pressures have been controlled  She denies having major shortness of breath  She denies having chest heaviness  We reviewed through her recent echocardiogram   No major change  05/27/2022:  She had a mechanical fall 2 weeks ago  She is now seen in atrial fibrillation newly recognized  Her last event monitor from last year was negative for atrial fibrillation  She denies having major palpitations  She has chronic shortness of breath  She states her weight has been stable on her lower extremity swelling has been also stable  She has been compliant with her diuretics  She denies feeling dizziness  06/21/2022:  She denies having recurrence of fall  She still has some exertional dyspnea  However overall she is feeling well  We reviewed her event monitor  S she is awaiting CPAP mask titration    08/04/2022:  She denies having major bleeding  She is wearing her CPAP at night  She denies having any falls  We reviewed through her event monitor      Patient Active Problem List   Diagnosis    Benign essential hypertension    Chronic diastolic (congestive) heart failure (HCC)    Hypothyroidism    Arthropathy of knee    Hallux abductovalgus with bunions, unspecified laterality    CKD (chronic kidney disease), stage III ([address] )    Diverticulitis of colon    Chronic gout without tophus    Hiatal hernia    Hyperlipemia    Hyperuricemia    Nephrolithiasis    WENDI (obstructive sleep apnea)    Pseudogout of left wrist    Anxiety    Gastroesophageal reflux disease without esophagitis    Exertional shortness of breath    Medicare annual wellness visit, subsequent    Prediabetes    Status post right frontotemporal replacement cranioplasty    BMI 38 0-38 9,adult    Sciatica of left side    Spinal stenosis of lumbar region with neurogenic claudication     Past Medical History:   Diagnosis Date    Anxiety     Arthritis     joints    Cataract     Disease of thyroid gland     Eczema     GERD (gastroesophageal reflux disease)     Gout     Heart failure (HCC)     Hepatitis     Hiatal hernia     Hypertension     Onychomycosis     last assessed: 4/29/16    Orthopnea     resolved: 1/8/16    Pseudogout of left wrist     Sleep apnea     wears CPAP    Stroke [facility])      Social History     Socioeconomic History    Marital status:      Spouse name: Not on file    Number of children: Not on file    Years of education: Not on file    Highest education level: Not on file   Occupational History    Not on file   Tobacco Use    Smoking status: Never Smoker    Smokeless tobacco: Never Used   Vaping Use    Vaping Use: Never used   Substance and Sexual Activity    Alcohol use: Yes     Alcohol/week: 0 0 standard drinks     Comment: Rare      Drug use: Never    Sexual activity: Not Currently     Birth control/protection: Post-menopausal   Other Topics Concern    Not on file   Social History Narrative    Daily coffee consumption    Lack of exercise    Sleeps 6-7 hours a day     Social Determinants of Health     Financial Resource Strain: Not on file   Food Insecurity: Not on file   Transportation Needs: Not on file   Physical Activity: Not on file   Stress: Not on file   Social Connections: Not on file   Intimate Partner Violence: Not on file   Housing Stability: Not on file      Family History   Problem Relation Age of Onset    Diabetes Mother     Coronary artery disease Mother     Arthritis Mother     Hypertension Mother     Hypertension Father     Diabetes Brother     Diabetes Son     Arthritis Family      Past Surgical History:   Procedure Laterality Date    ABDOMINAL SURGERY          BRAIN HEMATOMA EVACUATION Right 2020    Procedure: Right decompressive craniectomy and evacuation of intraparenchymal hematoma; Surgeon: Dominga Awad MD;  [facility];  Service: Neurosurgery    BREAST SURGERY Right     cyst removal    CARPAL TUNNEL RELEASE Left     CARPAL TUNNEL RELEASE Right     CATARACT EXTRACTION       SECTION  1960    x1    COLONOSCOPY N/A 2018    Procedure: COLONOSCOPY;  Surgeon: Giselle Otero MD;  Location: [facility] GI LAB; Service: Gastroenterology    DILATION AND CURETTAGE OF UTERUS  1967    EYE SURGERY Left 2006    cataracts    HERNIA REPAIR      umbilical hernia    INCISIONAL HERNIA REPAIR      incarcerated    KNEE ARTHROSCOPY Right     NJ REPLACE SKULL PLATE/FLAP Right     Procedure: right frontotemporal replacement cranioplasty;  Surgeon: Dominga Awad MD;  [facility];  Service: Neurosurgery    NJ XCAPSL CTRC RMVL INSJ IO LENS PROSTH W/O ECP Right 3/27/2017    Procedure: EXTRACTION EXTRACAPSULAR CATARACT PHACO INTRAOCULAR LENS (IOL);   Surgeon: Geovanna Land MD;  Location: [facility] MAIN OR;  Service: Ophthalmology       Current Outpatient Medications:     amLODIPine (NORVASC) 10 mg tablet, Take 0 5 tablets (5 mg total) by mouth daily, Disp: 90 tablet, Rfl: 1    aspirin (ECOTRIN LOW STRENGTH) 81 mg EC tablet, Take 1 tablet (81 mg total) by mouth daily, Disp: 90 tablet, Rfl: 3    atorvastatin (LIPITOR) 40 mg tablet, Take 1 tablet (40 mg total) by mouth every evening, Disp: 90 tablet, Rfl: 1    bumetanide (BUMEX) 2 mg tablet, Take 1 tablet (2 mg total) by mouth daily, Disp: 135 tablet, Rfl: 3    clopidogrel (Plavix) 75 mg tablet, Take 1 tablet (75 mg total) by mouth daily, Disp: 90 tablet, Rfl: 1    Diclofenac Sodium (VOLTAREN) 1 %, Apply 2 g topically 4 (four) times a day, Disp: 100 g, Rfl: 0    levothyroxine 88 mcg tablet, Take 1 tablet (88 mcg total) by mouth daily, Disp: 90 tablet, Rfl: 1    losartan (COZAAR) 25 mg tablet, Take 2 tab in AM and 1 tab in PM, Disp: 270 tablet, Rfl: 3    metoprolol tartrate (LOPRESSOR) 25 mg tablet, Take 1 tablet (25 mg total) by mouth every 12 (twelve) hours, Disp: 180 tablet, Rfl: 1    mirtazapine (REMERON) 7 5 MG tablet, Take 1 tablet (7 5 mg total) by mouth daily at bedtime, Disp: 30 tablet, Rfl: 1    pantoprazole (PROTONIX) 20 mg tablet, TAKE 1 TABLET(20 MG) BY MOUTH DAILY, Disp: 30 tablet, Rfl: 5    spironolactone (ALDACTONE) 25 mg tablet, Take 0 5 tablets (12 5 mg total) by mouth daily, Disp: 45 tablet, Rfl: 3  No Known Allergies    Review of Systems:  Review of Systems   Constitutional: Negative  Negative for activity change, appetite change, chills, diaphoresis, fatigue, fever and unexpected weight change  HENT: Negative  Negative for congestion, dental problem, drooling, ear discharge, ear pain, facial swelling, hearing loss, mouth sores, nosebleeds, postnasal drip, rhinorrhea, sinus pressure, sinus pain, sneezing, sore throat, tinnitus, trouble swallowing and voice change  Eyes: Negative  Negative for photophobia, pain, redness, itching and visual disturbance  Respiratory: Positive for shortness of breath  Negative for apnea, cough, choking, chest tightness, wheezing and stridor  Cardiovascular: Positive for leg swelling  Negative for chest pain and palpitations  Gastrointestinal: Negative  Negative for abdominal distention, abdominal pain, anal bleeding, blood in stool, constipation, diarrhea, nausea, rectal pain and vomiting  Endocrine: Negative    Negative for cold intolerance, heat intolerance, polydipsia, polyphagia and polyuria  Genitourinary: Negative  Negative for decreased urine volume, difficulty urinating, dyspareunia, dysuria, enuresis, flank pain, frequency, genital sores, hematuria, menstrual problem, pelvic pain, urgency, vaginal bleeding, vaginal discharge and vaginal pain  Musculoskeletal: Positive for back pain  Negative for arthralgias, gait problem, joint swelling, myalgias, neck pain and neck stiffness  Skin: Negative  Negative for color change, pallor, rash and wound  Allergic/Immunologic: Negative  Negative for environmental allergies, food allergies and immunocompromised state  Neurological: Negative  Negative for dizziness, tremors, seizures, syncope, facial asymmetry, speech difficulty, weakness, light-headedness, numbness and headaches  Hematological: Negative  Negative for adenopathy  Does not bruise/bleed easily  Psychiatric/Behavioral: Negative  Negative for agitation, behavioral problems, confusion, decreased concentration, dysphoric mood, hallucinations, self-injury, sleep disturbance and suicidal ideas  The patient is not nervous/anxious and is not hyperactive  All other systems reviewed and are negative  Vitals:    08/04/22 0923   BP: 122/62   BP Location: Right arm   Patient Position: Sitting   Cuff Size: Large   Pulse: 82   Temp: 98 2 °F (36 8 °C)   SpO2: 98%   Weight: 74 8 kg (165 lb)   Height: 5' 1" (1 549 m)     Physical Exam:  Physical Exam  Vitals and nursing note reviewed  Constitutional:       General: She is not in acute distress  Appearance: She is well-developed  She is not diaphoretic  HENT:      Head: Normocephalic and atraumatic  Right Ear: External ear normal       Left Ear: External ear normal    Eyes:      General: No scleral icterus  Right eye: No discharge  Left eye: No discharge        Conjunctiva/sclera: Conjunctivae normal       Pupils: Pupils are equal, round, and reactive to light  Neck:      Thyroid: No thyromegaly  Vascular: No JVD  Trachea: No tracheal deviation  Cardiovascular:      Rate and Rhythm: Normal rate  Rhythm irregular  Heart sounds: Murmur heard  No friction rub  Gallop present  Pulmonary:      Effort: Pulmonary effort is normal  No respiratory distress  Breath sounds: Normal breath sounds  No stridor  No wheezing or rales  Chest:      Chest wall: No tenderness  Abdominal:      General: Bowel sounds are normal  There is no distension  Palpations: Abdomen is soft  There is no mass  Tenderness: There is no abdominal tenderness  There is no guarding or rebound  Musculoskeletal:         General: No tenderness or deformity  Normal range of motion  Cervical back: Normal range of motion and neck supple  Skin:     General: Skin is warm and dry  Coloration: Skin is not pale  Findings: Lesion present  No erythema or rash  Neurological:      Mental Status: She is alert and oriented to person, place, and time  Cranial Nerves: No cranial nerve deficit  Motor: No abnormal muscle tone  Coordination: Coordination normal       Deep Tendon Reflexes: Reflexes are normal and symmetric  Psychiatric:         Behavior: Behavior normal          Thought Content:  Thought content normal          Judgment: Judgment normal          Labs:     Lab Results   Component Value Date    WBC 5 45 06/24/2022    HGB 12 5 06/24/2022    HCT 39 9 06/24/2022    MCV 91 06/24/2022     06/24/2022     Lab Results   Component Value Date    K 4 2 06/24/2022     06/24/2022    CO2 31 06/24/2022    BUN 44 (H) 06/24/2022    CREATININE 1 43 (H) 06/24/2022    GLUCOSE 124 02/11/2020    GLUF 84 06/24/2022    CALCIUM 9 4 06/24/2022    CORRECTEDCA 9 9 06/24/2022    AST 18 06/24/2022    ALT 25 06/24/2022    ALKPHOS 96 06/24/2022    EGFR 34 06/24/2022     Lab Results   Component Value Date    CHOL 191 12/27/2013    CHOL 218 2013     Lab Results   Component Value Date    HDL 46 (L) 2022    HDL 62 2021    HDL 59 2021     Lab Results   Component Value Date    LDLCALC 45 2022    LDLCALC 47 2021    LDLCALC 37 2021     Lab Results   Component Value Date    TRIG 92 2022    TRIG 73 2021    TRIG 105 2021     Lab Results   Component Value Date    HGBA1C 5 8 (H) 2022       Imaging & Testing   I have personally reviewed pertinent reports  EKG: Personally reviewed  Normal sinus rhythm no acute st/t wave    Cardiac testing:   Results for orders placed during the hospital encounter of 01/15/16   Echo complete with contrast if indicated    [address]  [phone]    Transthoracic Echocardiogram  2D, M-mode, Doppler, and Color Doppler    Study date:  15-Berny-2016    Patient: Shyann Chadwick  MR number: [id number]  Account number: [de-identified]  : 1940  Age: 67 years  Gender: Female  Status: Routine  Location: Echo lab  Height: 61 in  Weight: 214 5 lb  BP: 132/ 84 mmHg    Indications: HTN    Diagnoses: 401 9 - HYPERTENSION NOS    Sonographer:  Edgardo Aguilar  Primary Physician:  Estefany Solomon  Referring Physician:  Paramjit Ragsdale:  Amy Kwon  Interpreting Physician:  DO AFIA Martinez    LEFT VENTRICLE:  Systolic function was at the lower limits of normal  Ejection fraction was  estimated in the range of 50 % to 55 %  There were no regional wall motion abnormalities  There was moderate concentric hypertrophy  Features were consistent with a pseudonormal left ventricular filling pattern,  with concomitant abnormal relaxation and increased filling pressure (grade 2  diastolic dysfunction)  Doppler parameters were consistent with elevated mean  left atrial filling pressure  LEFT ATRIUM:  The atrium was moderately dilated      RIGHT ATRIUM:  The atrium was markedly dilated  MITRAL VALVE:  There was marked annular calcification  There was moderate regurgitation  TRICUSPID VALVE:  There was mild regurgitation  Pulmonary artery systolic pressure was mildly increased  Estimated peak PA pressure was 46 mmHg  HISTORY: PRIOR HISTORY: Patient has no history of cardiovascular disease  PROCEDURE: The procedure was performed in the echo lab  This was a routine  study  The transthoracic approach was used  The study included complete 2D  imaging, M-mode, complete spectral Doppler, and color Doppler  The heart rate  was 77 bpm, at the start of the study  Echocardiographic views were limited due  to poor acoustic window availability and decreased penetration  This was a  technically difficult study  LEFT VENTRICLE: Size was normal  Systolic function was at the lower limits of  normal  Ejection fraction was estimated in the range of 50 % to 55 %  There  were no regional wall motion abnormalities  There was moderate concentric  hypertrophy  DOPPLER: Features were consistent with a pseudonormal left  ventricular filling pattern, with concomitant abnormal relaxation and increased  filling pressure (grade 2 diastolic dysfunction)  Doppler parameters were  consistent with elevated mean left atrial filling pressure  RIGHT VENTRICLE: The size was normal  Systolic function was normal  DOPPLER:  Systolic pressure was within the normal range  LEFT ATRIUM: The atrium was moderately dilated  No thrombus was identified  RIGHT ATRIUM: The atrium was markedly dilated  MITRAL VALVE: There was marked annular calcification  Valve structure was  normal  There was normal leaflet separation  No echocardiographic evidence for  prolapse  DOPPLER: The transmitral velocity was within the normal range  There  was no evidence for stenosis  There was moderate regurgitation  AORTIC VALVE: The valve was trileaflet   Leaflets exhibited normal thickness,  normal cuspal separation, and sclerosis  DOPPLER: Transaortic velocity was  within the normal range  There was no evidence for stenosis  There was no  regurgitation  TRICUSPID VALVE: The valve structure was normal  There was normal leaflet  separation  DOPPLER: The transtricuspid velocity was within the normal range  There was mild regurgitation  Pulmonary artery systolic pressure was mildly  increased  Estimated peak PA pressure was 46 mmHg  PULMONIC VALVE: Leaflets exhibited normal thickness, no calcification, and  normal cuspal separation  DOPPLER: The transpulmonic velocity was within the  normal range  There was mild regurgitation  PERICARDIUM: There was no thickening  There was no pericardial effusion  AORTA: The root exhibited normal size  PULMONARY ARTERY: The size was normal  The morphology appeared normal     SYSTEM MEASUREMENT TABLES    2D mode  AoR Diam 2D: 2 8 cm  LA Diam (2D): 5 3 cm  LA/Ao (2D): 1 89  FS (2D Teich): 25 3 %  IVSd (2D): 1 44 cm  LVDEV: 98 3 cm³  LVESV: 49 1 cm³  LVIDd(2D): 4 62 cm  LVISd (2D): 3 45 cm  LVPWd (2D): 1 3 cm  SV (Teich): 49 2 cm³    Apical four chamber  LVEF A4C: 55 %    Unspecified Scan Mode  MV Peak A Jesse: 1110 mm/s  MV Peak E Jesse   Mean: 1400 mm/s  MVA (PHT): 3 55 cm squared  PHT: 58 ms  Max P mm[Hg]  V Max: 2990 mm/s  Vmax: 3050 mm/s  RA Area: 19 6 cm squared  RA Volume: 55 3 cm³  TAPSE: 1 9 cm    [facility]    Prepared and electronically signed by    Warden Stefani DO  Signed 2016 17:37:47       EKG atrial fibrillation 100 beats per minute  afib 81bmp rbbb nonspecific t-wave changes        Chel Alaniz  Please call with any questions or suggestions    A description of the counselin minutes spent with family discussing about risk for long-term anticoagulation versus benefits  Goals and Barriers:  Patient's ability to self care:  Medication side effect reviewed with patient in detail and all their questions answered  "This note has been constructed using a voice recognition system  Therefore there may be syntax, spelling, and/or grammatical errors   Please call if you have any questions  "

## 2022-08-08 ENCOUNTER — TELEPHONE (OUTPATIENT)
Dept: CARDIOLOGY CLINIC | Facility: CLINIC | Age: 82
End: 2022-08-08

## 2022-08-08 NOTE — TELEPHONE ENCOUNTER
Patient's daughter in law called regarding eliquis rx  Stating that it would be $600 a month   Advised to call insurance and see if anything was less expensive, If not we could try PAF  Camila Stanislaus

## 2022-08-08 NOTE — TELEPHONE ENCOUNTER
Lets try patient assitance or she can stay on plavix  The other thing is would she be willing to order from [facility] ([facility])  I can give her script  Daughter can help in looking for cheapest [facility] pharmacy for eliquis

## 2022-08-08 NOTE — TELEPHONE ENCOUNTER
Patients daughter called back  No other medications are covered under her plan  She has a high deductible  Would not be eligible for Patient assistance due to cost of medications being lower  She was wondering if she could stay on plavix, or if you could reach out to her and discuss medications

## 2022-08-08 NOTE — TELEPHONE ENCOUNTER
Daughter called back, theyd like to stick with plavix   She would like to know if you still want pt to take the eliquis samples or not to

## 2022-08-12 ENCOUNTER — RA CDI HCC (OUTPATIENT)
Dept: OTHER | Facility: HOSPITAL | Age: 82
End: 2022-08-12

## 2022-08-12 ENCOUNTER — LAB (OUTPATIENT)
Dept: LAB | Facility: CLINIC | Age: 82
End: 2022-08-12
Payer: MEDICARE

## 2022-08-12 DIAGNOSIS — N18.31 STAGE 3A CHRONIC KIDNEY DISEASE (HCC): ICD-10-CM

## 2022-08-12 DIAGNOSIS — E03.9 HYPOTHYROIDISM, UNSPECIFIED TYPE: Chronic | ICD-10-CM

## 2022-08-12 DIAGNOSIS — E78.5 HYPERLIPIDEMIA, UNSPECIFIED HYPERLIPIDEMIA TYPE: ICD-10-CM

## 2022-08-12 DIAGNOSIS — R73.03 PREDIABETES: ICD-10-CM

## 2022-08-12 LAB
ANION GAP SERPL CALCULATED.3IONS-SCNC: 4 MMOL/L (ref 4–13)
BUN SERPL-MCNC: 38 MG/DL (ref 5–25)
CALCIUM SERPL-MCNC: 9.6 MG/DL (ref 8.3–10.1)
CHLORIDE SERPL-SCNC: 107 MMOL/L (ref 96–108)
CO2 SERPL-SCNC: 28 MMOL/L (ref 21–32)
CREAT SERPL-MCNC: 1.28 MG/DL (ref 0.6–1.3)
GFR SERPL CREATININE-BSD FRML MDRD: 39 ML/MIN/1.73SQ M
GLUCOSE P FAST SERPL-MCNC: 93 MG/DL (ref 65–99)
POTASSIUM SERPL-SCNC: 4.2 MMOL/L (ref 3.5–5.3)
SODIUM SERPL-SCNC: 139 MMOL/L (ref 135–147)

## 2022-08-12 PROCEDURE — 80048 BASIC METABOLIC PNL TOTAL CA: CPT

## 2022-08-12 PROCEDURE — 36415 COLL VENOUS BLD VENIPUNCTURE: CPT

## 2022-08-12 NOTE — PROGRESS NOTES
[facility]  coding opportunities          Chart Reviewed number of suggestions sent to Provider: 1   I13 [id number]  Patients Insurance     Medicare Insurance: [employer]

## 2022-08-13 NOTE — RESULT ENCOUNTER NOTE
Hello    Patient normally is followed up by Ms Aravind Baez  Please let pt know that creat stable 1 28  electrolytes stable   No changes for now    Thank you    np

## 2022-08-15 ENCOUNTER — TELEPHONE (OUTPATIENT)
Dept: NEPHROLOGY | Facility: CLINIC | Age: 82
End: 2022-08-15

## 2022-08-15 NOTE — TELEPHONE ENCOUNTER
Pt advised that labs are stable, no changes for now per Dr Miller     ----- Message from Nicholas Hernandez MD sent at 8/12/2022 10:43 PM EDT -----  Hello    Patient normally is followed up by Ms Diannah Libman  Please let pt know that creat stable 1 28  electrolytes stable   No changes for now    Thank you    np

## 2022-08-19 ENCOUNTER — OFFICE VISIT (OUTPATIENT)
Dept: FAMILY MEDICINE CLINIC | Facility: CLINIC | Age: 82
End: 2022-08-19
Payer: MEDICARE

## 2022-08-19 VITALS
BODY MASS INDEX: 31.53 KG/M2 | OXYGEN SATURATION: 97 % | HEART RATE: 76 BPM | WEIGHT: 167 LBS | SYSTOLIC BLOOD PRESSURE: 124 MMHG | DIASTOLIC BLOOD PRESSURE: 62 MMHG | HEIGHT: 61 IN | RESPIRATION RATE: 16 BRPM

## 2022-08-19 DIAGNOSIS — F41.9 ANXIETY: Primary | ICD-10-CM

## 2022-08-19 PROCEDURE — 99213 OFFICE O/P EST LOW 20 MIN: CPT | Performed by: FAMILY MEDICINE

## 2022-08-19 NOTE — PROGRESS NOTES
Subjective:      Patient ID: Jasen Maxwell is a 79 y o  female  Anxiety  Presents for follow-up visit  Symptoms include excessive worry  Patient reports no chest pain, insomnia, palpitations or shortness of breath  Symptoms occur occasionally  The severity of symptoms is causing significant distress  Compliance with medications: Remeron  Treatment side effects: Dizziness  Stopped medication shortly after starting  Past Medical History:   Diagnosis Date    Anxiety     Arthritis     joints    Cataract     Disease of thyroid gland     Eczema     GERD (gastroesophageal reflux disease)     Gout     Heart failure (HCC)     Hepatitis     Hiatal hernia     Hypertension     Onychomycosis     last assessed: 16    Orthopnea     resolved: 16    Pseudogout of left wrist     Sleep apnea     wears CPAP    Stroke [facility])        Family History   Problem Relation Age of Onset    Diabetes Mother     Coronary artery disease Mother     Arthritis Mother     Hypertension Mother     Hypertension Father     Diabetes Brother     Diabetes Son     Arthritis Family        Past Surgical History:   Procedure Laterality Date    ABDOMINAL SURGERY          BRAIN HEMATOMA EVACUATION Right 2020    Procedure: Right decompressive craniectomy and evacuation of intraparenchymal hematoma; Surgeon: Lorenza Burk MD;  [facility];  Service: Neurosurgery    BREAST SURGERY Right     cyst removal    CARPAL TUNNEL RELEASE Left     CARPAL TUNNEL RELEASE Right 2004    CATARACT EXTRACTION       SECTION  1960    x1    COLONOSCOPY N/A 2018    Procedure: COLONOSCOPY;  Surgeon: Ashley Prabhakar MD;  Location: [facility] GI LAB;   Service: Gastroenterology    DILATION AND CURETTAGE OF UTERUS  1967    EYE SURGERY Left 2006    cataracts    HERNIA REPAIR      umbilical hernia    INCISIONAL HERNIA REPAIR      incarcerated    KNEE ARTHROSCOPY Right     GA REPLACE SKULL PLATE/FLAP Right 4/6/2020    Procedure: right frontotemporal replacement cranioplasty;  Surgeon: Dereck Emerson MD;  Location:  MAIN OR;  Service: Neurosurgery    AR XCAPSL CTRC RMVL INSJ IO LENS PROSTH W/O ECP Right 3/27/2017    Procedure: EXTRACTION EXTRACAPSULAR CATARACT PHACO INTRAOCULAR LENS (IOL); Surgeon: Michael Marquez MD;  Location: [facility] MAIN OR;  Service: Ophthalmology        reports that she has never smoked  She has never used smokeless tobacco  She reports current alcohol use  She reports that she does not use drugs  Current Outpatient Medications:     amLODIPine (NORVASC) 10 mg tablet, Take 0 5 tablets (5 mg total) by mouth daily, Disp: 90 tablet, Rfl: 1    apixaban (Eliquis) 2 5 mg, Take 1 tablet (2 5 mg total) by mouth 2 (two) times a day, Disp: 60 tablet, Rfl: 1    atorvastatin (LIPITOR) 40 mg tablet, Take 1 tablet (40 mg total) by mouth every evening, Disp: 90 tablet, Rfl: 1    bumetanide (BUMEX) 2 mg tablet, Take 1 tablet (2 mg total) by mouth daily, Disp: 135 tablet, Rfl: 3    Diclofenac Sodium (VOLTAREN) 1 %, Apply 2 g topically 4 (four) times a day, Disp: 100 g, Rfl: 0    levothyroxine 88 mcg tablet, Take 1 tablet (88 mcg total) by mouth daily, Disp: 90 tablet, Rfl: 1    losartan (COZAAR) 25 mg tablet, Take 2 tab in AM and 1 tab in PM, Disp: 270 tablet, Rfl: 3    metoprolol tartrate (LOPRESSOR) 25 mg tablet, Take 1 tablet (25 mg total) by mouth every 12 (twelve) hours, Disp: 180 tablet, Rfl: 1    pantoprazole (PROTONIX) 20 mg tablet, TAKE 1 TABLET(20 MG) BY MOUTH DAILY, Disp: 30 tablet, Rfl: 5    spironolactone (ALDACTONE) 25 mg tablet, Take 0 5 tablets (12 5 mg total) by mouth daily, Disp: 45 tablet, Rfl: 3    The following portions of the patient's history were reviewed and updated as appropriate: allergies, current medications, past family history, past medical history, past social history, past surgical history and problem list     Review of Systems   Constitutional: Negative  Respiratory: Negative  Negative for shortness of breath  Cardiovascular: Negative  Negative for chest pain and palpitations  Musculoskeletal: Negative for myalgias  Neurological: Negative  Psychiatric/Behavioral: Negative  The patient does not have insomnia  Objective:    /62   Pulse 76   Resp 16   Ht 5' 1" (1 549 m)   Wt 75 8 kg (167 lb)   SpO2 97%   BMI 31 55 kg/m²      Physical Exam  Constitutional:       Appearance: She is well-developed  Cardiovascular:      Rate and Rhythm: Normal rate and regular rhythm  Heart sounds: Normal heart sounds  Pulmonary:      Effort: Pulmonary effort is normal       Breath sounds: Normal breath sounds  Abdominal:      General: Bowel sounds are normal       Palpations: Abdomen is soft  Neurological:      Mental Status: She is alert and oriented to person, place, and time  Psychiatric:         Behavior: Behavior normal          Judgment: Judgment normal            Recent Results (from the past 1008 hour(s))   Basic metabolic panel    Collection Time: 08/12/22  7:10 AM   Result Value Ref Range    Sodium 139 135 - 147 mmol/L    Potassium 4 2 3 5 - 5 3 mmol/L    Chloride 107 96 - 108 mmol/L    CO2 28 21 - 32 mmol/L    ANION GAP 4 4 - 13 mmol/L    BUN 38 (H) 5 - 25 mg/dL    Creatinine 1 28 0 60 - 1 30 mg/dL    Glucose, Fasting 93 65 - 99 mg/dL    Calcium 9 6 8 3 - 10 1 mg/dL    eGFR 39 ml/min/1 73sq m       Assessment/Plan:             Problem List Items Addressed This Visit        Other    Anxiety - Primary     Patient reported side effects to remeron, developed symptoms of dizziness after she took the medication  Says that she has very supportive family and friends, and she prefers to be off medications  Declines therapy, since she is well connected to friend  Will fup if symptoms worsen

## 2022-08-19 NOTE — ASSESSMENT & PLAN NOTE
Patient reported side effects to remeron, developed symptoms of dizziness after she took the medication  Says that she has very supportive family and friends, and she prefers to be off medications  Declines therapy, since she is well connected to friend  Will fup if symptoms worsen

## 2022-08-23 ENCOUNTER — OFFICE VISIT (OUTPATIENT)
Dept: PAIN MEDICINE | Facility: CLINIC | Age: 82
End: 2022-08-23
Payer: MEDICARE

## 2022-08-23 VITALS
WEIGHT: 165 LBS | SYSTOLIC BLOOD PRESSURE: 126 MMHG | BODY MASS INDEX: 31.15 KG/M2 | DIASTOLIC BLOOD PRESSURE: 62 MMHG | RESPIRATION RATE: 16 BRPM | HEART RATE: 73 BPM | HEIGHT: 61 IN

## 2022-08-23 DIAGNOSIS — M51.16 INTERVERTEBRAL DISC DISORDER WITH RADICULOPATHY OF LUMBAR REGION: ICD-10-CM

## 2022-08-23 DIAGNOSIS — M48.061 SPINAL STENOSIS OF LUMBAR REGION WITHOUT NEUROGENIC CLAUDICATION: ICD-10-CM

## 2022-08-23 DIAGNOSIS — M47.816 LUMBAR SPONDYLOSIS: ICD-10-CM

## 2022-08-23 DIAGNOSIS — G89.4 CHRONIC PAIN SYNDROME: Primary | ICD-10-CM

## 2022-08-23 PROCEDURE — 99214 OFFICE O/P EST MOD 30 MIN: CPT

## 2022-08-23 RX ORDER — CLOPIDOGREL BISULFATE 75 MG/1
75 TABLET ORAL DAILY
COMMUNITY
End: 2022-09-30 | Stop reason: SDUPTHER

## 2022-08-23 NOTE — PROGRESS NOTES
Assessment:  1  Chronic pain syndrome    2  Intervertebral disc disorder with radiculopathy of lumbar region    3  Spinal stenosis of lumbar region without neurogenic claudication    4  Lumbar spondylosis        Plan:  The patient is an 48-year-old female with a history of chronic pain secondary to low back pain, lumbar spondylosis, lumbar stenosis and lumbar spondylolisthesis who presents to the office with ongoing right-sided low back pain that radiates down the right lower extremity posteriorly stopping at the right foot  To decrease inflammation and provide her relief of her right-sided low back pain, I will repeat the right L4-L5 TFESI that was done on 02/03/2022 as it provided her moderate relief of her right-sided low back pain symptoms  I instructed our  will call to schedule her  Complete risks and benefits including bleeding, infection, tissue reaction, nerve injury and allergic reaction were discussed  The approach was demonstrated using models and literature was provided  Verbal and written consent was obtained  She can continue taking Tylenol  My impressions and treatment recommendations were discussed in detail with the patient who verbalized understanding and had no further questions  Discharge instructions were provided  I personally saw and examined the patient and I agree with the above discussed plan of care  Orders Placed This Encounter   Procedures    [place] spine and pain procedure     Dr Loly Valdovinos     Standing Status:   Future     Standing Expiration Date:   8/23/2026     Order Specific Question:   Reason for Exam:     Answer:   Right sided L4-L5 TFESI     Order Specific Question:   Anticoagulant hold needed?      Answer:   Eliquis     New Medications Ordered This Visit   Medications    clopidogrel (PLAVIX) 75 mg tablet     Sig: Take 75 mg by mouth daily       History of Present Illness:  Yonas Murray is a 79 y o  female with a history of chronic pain secondary to low back pain, lumbar spondylosis, lumbar stenosis and lumbar spondylolisthesis  She was last seen on 02/03/2022 where she underwent a right-sided L4-L5 TFESI which provided her moderate relief of her low back pain symptoms for 6 months before her pain started to return  She presents to the office with ongoing right-sided low back pain that radiates down her right leg posteriorly to her right foot  She states her pain is constant but worse in the evening and at night  She rates the quality of her pain as sharp and is currently rating it a 7/10 on a numeric scale  Current pain medications include Tylenol as needed which provides little relief  She is unable to take NSAIDs  I have personally reviewed and/or updated the patient's past medical history, past surgical history, family history, social history, current medications, allergies, and vital signs today  Review of Systems   Respiratory: Negative for shortness of breath  Cardiovascular: Negative for chest pain  Gastrointestinal: Negative for constipation, diarrhea, nausea and vomiting  Musculoskeletal: Positive for back pain, gait problem and joint swelling  Negative for arthralgias and myalgias  RLE Pain   Skin: Negative for rash  Neurological: Negative for dizziness, seizures and weakness  All other systems reviewed and are negative        Patient Active Problem List   Diagnosis    Benign essential hypertension    Chronic diastolic (congestive) heart failure (HCC)    Hypothyroidism    Arthropathy of knee    Hallux abductovalgus with bunions, unspecified laterality    CKD (chronic kidney disease), stage III ([address] )    Diverticulitis of colon    Chronic gout without tophus    Hiatal hernia    Hyperlipemia    Hyperuricemia    Nephrolithiasis    WENDI (obstructive sleep apnea)    Pseudogout of left wrist    Anxiety    Gastroesophageal reflux disease without esophagitis    Exertional shortness of breath    Medicare annual wellness visit, subsequent    Prediabetes    Status post right frontotemporal replacement cranioplasty    BMI 38 0-38 9,adult    Sciatica of left side    Spinal stenosis of lumbar region with neurogenic claudication       Past Medical History:   Diagnosis Date    Anxiety     Arthritis     joints    Cataract     Disease of thyroid gland     Eczema     GERD (gastroesophageal reflux disease)     Gout     Heart failure (HCC)     Hepatitis     Hiatal hernia     Hypertension     Onychomycosis     last assessed: 16    Orthopnea     resolved: 16    Pseudogout of left wrist     Sleep apnea     wears CPAP    Stroke [facility])        Past Surgical History:   Procedure Laterality Date    ABDOMINAL SURGERY          BRAIN HEMATOMA EVACUATION Right 2020    Procedure: Right decompressive craniectomy and evacuation of intraparenchymal hematoma; Surgeon: Derrick Weiss MD;  [facility];  Service: Neurosurgery    BREAST SURGERY Right     cyst removal    CARPAL TUNNEL RELEASE Left     CARPAL TUNNEL RELEASE Right     CATARACT EXTRACTION       SECTION  1960    x1    COLONOSCOPY N/A 2018    Procedure: COLONOSCOPY;  Surgeon: Cordell Salvador MD;  Location: [place] [id number] GI LAB; Service: Gastroenterology    DILATION AND CURETTAGE OF UTERUS  1967    EYE SURGERY Left 2006    cataracts    HERNIA REPAIR      umbilical hernia    INCISIONAL HERNIA REPAIR      incarcerated    KNEE ARTHROSCOPY Right     ID REPLACE SKULL PLATE/FLAP Right [id number]    Procedure: right frontotemporal replacement cranioplasty;  Surgeon: Derrick Weiss MD;  [facility];  Service: Neurosurgery    ID XCAPSL CTRC RMVL INSJ IO LENS PROSTH W/O ECP Right 3/27/2017    Procedure: EXTRACTION EXTRACAPSULAR CATARACT PHACO INTRAOCULAR LENS (IOL);   Surgeon: Jesse Witt MD;  [facility] MAIN OR;  Service: Ophthalmology       Family History   Problem Relation Age of Onset    Diabetes Mother  Coronary artery disease Mother     Arthritis Mother     Hypertension Mother     Hypertension Father     Diabetes Brother     Diabetes Son     Arthritis Family        Social History     Occupational History    Not on file   Tobacco Use    Smoking status: Never Smoker    Smokeless tobacco: Never Used   Vaping Use    Vaping Use: Never used   Substance and Sexual Activity    Alcohol use: Yes     Alcohol/week: 0 0 standard drinks     Comment: Rare   Drug use: Never    Sexual activity: Not Currently     Birth control/protection: Post-menopausal       Current Outpatient Medications on File Prior to Visit   Medication Sig    amLODIPine (NORVASC) 10 mg tablet Take 0 5 tablets (5 mg total) by mouth daily    atorvastatin (LIPITOR) 40 mg tablet Take 1 tablet (40 mg total) by mouth every evening    bumetanide (BUMEX) 2 mg tablet Take 1 tablet (2 mg total) by mouth daily    clopidogrel (PLAVIX) 75 mg tablet Take 75 mg by mouth daily    Diclofenac Sodium (VOLTAREN) 1 % Apply 2 g topically 4 (four) times a day    levothyroxine 88 mcg tablet Take 1 tablet (88 mcg total) by mouth daily    losartan (COZAAR) 25 mg tablet Take 2 tab in AM and 1 tab in PM    metoprolol tartrate (LOPRESSOR) 25 mg tablet Take 1 tablet (25 mg total) by mouth every 12 (twelve) hours    pantoprazole (PROTONIX) 20 mg tablet TAKE 1 TABLET(20 MG) BY MOUTH DAILY    spironolactone (ALDACTONE) 25 mg tablet Take 0 5 tablets (12 5 mg total) by mouth daily    apixaban (Eliquis) 2 5 mg Take 1 tablet (2 5 mg total) by mouth 2 (two) times a day (Patient not taking: Reported on 8/23/2022)     No current facility-administered medications on file prior to visit         No Known Allergies    Physical Exam:    /62   Pulse 73   Resp 16   Ht 5' 1" (1 549 m)   Wt 74 8 kg (165 lb)   BMI 31 18 kg/m²     Constitutional:normal, well developed, well nourished, alert, in no distress and non-toxic and no overt pain behavior    Eyes:anicteric  HEENT:grossly intact  Neck:supple, symmetric, trachea midline and no masses   Pulmonary:even and unlabored  Cardiovascular:No edema or pitting edema present  Skin:Normal without rashes or lesions and well hydrated  Psychiatric:Mood and affect appropriate  Neurologic:Cranial Nerves II-XII grossly intact  Musculoskeletal:antalgic and ambulates with cane     Lumbar Spine Exam    Appearance:  Normal lordosis  Palpation/Tenderness:  right lumbar paraspinal tenderness  Sensory:  no sensory deficits noted  Range of Motion:  Flexion:  Minimally limited  with pain  Extension:  Minimally limited  with pain  Motor Strength:  Left hip flexion:  5/5  Right hip flexion:  5/5  Left knee flexion:  5/5  Left knee extension:  5/5  Right knee flexion:  5/5  Right knee extension:  5/5  Left foot dorsiflexion:  5/5  Left foot plantar flexion:  5/5  Right foot dorsiflexion:  5/5  Right foot plantar flexion:  5/5      Imaging

## 2022-08-26 ENCOUNTER — TELEPHONE (OUTPATIENT)
Dept: PAIN MEDICINE | Facility: CLINIC | Age: 82
End: 2022-08-26

## 2022-08-26 ENCOUNTER — TELEPHONE (OUTPATIENT)
Dept: CARDIOLOGY CLINIC | Facility: CLINIC | Age: 82
End: 2022-08-26

## 2022-08-26 NOTE — TELEPHONE ENCOUNTER
As long as she knows that there is a risk of stroke, she can hold plavix and aspirin for 5 days prior to spinal injection

## 2022-08-26 NOTE — TELEPHONE ENCOUNTER
Kateryna ( LUDWIG) called in to schedule the procedure or start the process of the medication hold  Please be advised thank you    Pt can be reached @[phone]

## 2022-08-26 NOTE — TELEPHONE ENCOUNTER
Patient's daughter called seeking instructions for spinal injection  Patient is taking plavix and low dose aspirin  Can she hold for four days? If so, both?

## 2022-08-29 ENCOUNTER — TELEPHONE (OUTPATIENT)
Dept: PAIN MEDICINE | Facility: CLINIC | Age: 82
End: 2022-08-29

## 2022-08-29 NOTE — TELEPHONE ENCOUNTER
This patient is being considered for a neuraxial injection for the management of their pain  However, the patient is currently on an anticoagulant per your orders  The American Society [employer]est[employer] Guideline on neuraxial procedures and anti-coagulation indicates that medication should be held as follows: Plavix 7 days prior to injection  Please advise if you ok the hold of this medication  I realize that the patient has already reached out to you regarding holding the Plavix, but the procedure she is scheduled to have, Rt  L4-L5-TFESI, requires a hold of 7 days, not 5 as patient's daughter in law reported      Thank you,    Mark Durand  Procedure   [facility]

## 2022-08-30 NOTE — TELEPHONE ENCOUNTER
This patient is being considered for a neuraxial injection for the management of their pain  However, the patient is currently on an anticoagulant per your orders  The American Society of Regional Anest[employer] Guideline on neuraxial procedures and anti-coagulation indicates that medication should be held as follows: Plavix 7 days prior to injection  Please advise if you ok the hold of this medication      Thank you,    Christiano Buchanan  Procedure   [facility]

## 2022-08-30 NOTE — TELEPHONE ENCOUNTER
This medication was ordered by a Dr Cori Hooker    Also the patient's primary care physician is Dr Neha Obrien

## 2022-08-31 NOTE — TELEPHONE ENCOUNTER
Pt's daughter-in-law Rach Montano is requesting to speak with procedure    Please reach out to her at # [phone], thx

## 2022-09-01 ENCOUNTER — OFFICE VISIT (OUTPATIENT)
Dept: NEPHROLOGY | Facility: CLINIC | Age: 82
End: 2022-09-01
Payer: MEDICARE

## 2022-09-01 VITALS
DIASTOLIC BLOOD PRESSURE: 60 MMHG | SYSTOLIC BLOOD PRESSURE: 110 MMHG | WEIGHT: 162 LBS | BODY MASS INDEX: 30.58 KG/M2 | HEIGHT: 61 IN

## 2022-09-01 DIAGNOSIS — N18.31 STAGE 3A CHRONIC KIDNEY DISEASE (HCC): Primary | ICD-10-CM

## 2022-09-01 PROCEDURE — 99213 OFFICE O/P EST LOW 20 MIN: CPT | Performed by: INTERNAL MEDICINE

## 2022-09-01 NOTE — LETTER
September 1, 2022     Michelle Connors MD  [id number] [facility] [address]  [place] [address]    Patient: Leydi Gregory   YOB: 1940   Date of Visit: 9/1/2022       Dear Dr Leeanna Gusman:    Thank you for referring Katie Calderon to me for evaluation  Below are my notes for this consultation  If you have questions, please do not hesitate to call me  I look forward to following your patient along with you  Sincerely,        Fabienne Homans, MD        CC: No Recipients  Fabienne Homans, MD  9/1/2022  1:29 PM  Sign when Signing Visit  NEPHROLOGY OFFICE VISIT   Leydi Gregory 79 y o  female MRN: [id number]  9/1/2022    Reason for Visit: CKD III    ASSESSMENT and PLAN:    I had the pleasure of seeing Ms Lili Ulrich today in the renal clinic for the continued management of CKD III      2/2020 - February with 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 on February 11th   Completed course of Keppra for seizure prophylaxis      3/30/2020 -Genet Alejandrina was increased to 50 mg twice a day from 50 in the morning and 25 in the evening due to high blood pressures     4/3/2020 - spironolactone 12 5 mg daily was started due to continued high blood pressure     4/2020 - patient had presented for replacement current of cranioplasty on April 6   And subsequently was admitted to the rehab center postoperatively      March 2021- patient was admitted to hospital with not feeling well and   Headache   CT scan of the head with no acute intracranial pathology  The other symptoms of memory loss were also present   Patient was referred to geriatrician      April 2021 - sciatic pain  Received brief steroid course     5/2021 - back pain  Went to ER   Given pred taper       19-year-old female with a past medical history of chronic kidney disease, venous stasis, HTN, hypothyroidism, lumbar canal stenosis, Anxiety, GERD, neuropathy, Gout, EF 26-39%, Grade 2 diastolic dysfunction who presents for follow up for CKD  To note, patient's  has now passed away in August 1) CKD III - patient also has a long standing history of HTN  Creatinine in 2013, 0 9 mg/dL  Cr early 2016 was 0 80-0 9 mg/dL; then increased starting in august 2016 to 2 3 mg/dL and has fluctuated since  CT scan in 2016, kidneys appearing normal at that time       Etiology of CKD may be long standing HTN and ATN  Baseline Cr ~ 1 1 -1 3 mg/dL     Most recent creat stable 1 28 mg/dL August 12th with stable sodium and potassium      - Urine eos 0%;   - A1c controlled prior  at 5 7% on December 8th in 2020  -U PCR stable  0 15 stable --> 0 18 gm/gm in 6/2020 --> 0 1 December 8th --> 0 12 gm/gm (3/22/2021) --> too low to quantify (6/2021) --> 0 1 6/3/2022  -SPEP unrevealing  -UPEP unrevealing  - C3, C4 unrevealing  - Renal u/s with R 10 3 cm, L 10 4 cm, renal cortex 1 cm b/l; both kidneys slightly reduced in size; lower pole of R kidney is non obstructing calculus  - Pt follows with Urology team for nephrolithiasis  - No NSAIDs, the patient is not currently taking NSAID  -nuc stress test per Cardiology in Jan 2019 unrevealing  - repeat renal u/s 1/2019 - unrevealing with exception of renal cortical atrophy; but also 6 mm non shadowing calculus      Plan:     - no changes today  - labs in 4-5 months  - appt in 5 months   -patient was advised to check blood pressures once a day for 2 weeks and hold amlodipine if blood pressures are less than 082 systolic     -I am concerned that the patient is losing weight because she is not eating well which appears to be secondary to depressed mood and lack of motivation per the patient description  -patient is feeling depressed  Is tearful  Losing weight  Patient states she understands she is depressed but is not wanting or willing to accept medications or seeing a therapist   I have offered referral to a therapist today and patient declines    Patient also ask that I do not talk directly with her primary care physician regarding my concerns  But I did explain that I will be sending my notes to the primary care physician to review  Patient stated understanding  Patient states that this is because she does not want her physicians or family to worry about her  Patient's daughter was present during the conversation     2) SOB - Volume - follows with Cardiology     - on Bumetanide     3) HTN -      - cont losartan, bumex and amlodipine, spironolactone  -  due to bradycardia, beta-blocker was held prior but given new onset atrial fibrillation around May 2022, patient was restarted on metoprolol per Cardiology team  - no changes today  See above    - see plan above  -may need to lower or hold amlodipine soon     4) hypothyroid - as per Primary Care Physician     5) HPL - Primary Care following       6) Electrolytes - stable     7) MBD -     - Vit D 50 7 in nov 2019 --> 40 8 March 2022  - PTH improved 89 in nov 2019 --> 78 6 in June 2020 --> 88 3/8/2022     8) gout -      -monitor for flare     9) back pain - follows with Pain management     - pain sig improved with inj     10) Colonoscopy in feb with internal hemorrhoids and polyp removed       11) alk phos elevation     -improved     12) Anemia - Hb stable     -hemoglobin stable     13) Mitral valve calcification     - moderate to severe mitral regurgitation  -eventually may require further evaluation  Being monitored for now conservatively    - per Cardiology team     14) elevated D-dimer prior-patient was given duplex of lower extremities which was unrevealing prior     15) knee pain after fall in august 2020     - saw Orthopedic team  - steroid injection was given     16) intracranial hemorrhage with mass effect requiring craniotomy on 2/11/2020     - now is status post replacement of cranioplasty in April 2020     17)  Anxiety- patient was started on Cymbalta  PCP is attempting off of gabapentin     - there were periods of not taking cymbalta     18)   Zoster infection in November 2020-treated with Valtrex per primary team     19) a fib - new onset in May 2022    -was advised to have Holter monitor  There is concern for tachy-hola syndrome initially and Holter monitor showed periods of atrial fibrillation with RVR and nonsustained ventricular tachycardia  Was started on low-dose beta-blocker with metoprolol   -in July metoprolol was titrated further to 25 mg twice a day  -was started on Eliquis 2 5 mg twice a day  Lower dose was chosen due to high risk factors  Patient opted to remain only on Plavix and aspirin  20) poor appetite    - depressed mood  Patient states that she was offered antidepressants by her primary care physician and she has declined in the past   She does not want to see a therapist   She understands that she has depression or depressed mood but currently is not willing to undergo therapy for this      It was a pleasure evaluating your patient  Thank you for allowing our team to participate in the care of Ms Mirian Welch  Please do not hesitate to contact our team if further issues/questions shall arise in the interim       No problem-specific Assessment & Plan notes found for this encounter  HPI:    Patient has chronic pain  Otherwise denies complaints  But during further discussion does have depressed mood      PATIENT INSTRUCTIONS:    Patient Instructions   1) Avoid NSAIDS - (Example - motrin, advil, ibuprofen, aleve, exederin, etc)  2) Always follow a low salt diet  3) please have labwork in 4-5 months  4) appointment in 5 months  6) no changes to your medications today  7) please think about seeing a therapist   8) check your blood pressures once daily  - call me if BP is less than 105 (top number) and stop amlodipine        OBJECTIVE:  Current Weight: Weight - Scale: 73 5 kg (162 lb)  Vitals:    09/01/22 1250 09/01/22 1315   BP:  110/60   Weight: 73 5 kg (162 lb)    Height: 5' 1" (1 549 m)     Body mass index is 30 61 kg/m²  REVIEW OF SYSTEMS:    Review of Systems   Constitutional: Negative  Negative for fatigue  HENT: Negative  Eyes: Negative  Respiratory: Negative  Negative for shortness of breath  Cardiovascular: Negative  Negative for leg swelling  Gastrointestinal: Negative  Endocrine: Negative  Genitourinary: Negative  Negative for difficulty urinating  Musculoskeletal: Negative  Skin: Negative  Allergic/Immunologic: Negative  Neurological: Negative  Hematological: Negative  Psychiatric/Behavioral:        Depressed mood   All other systems reviewed and are negative  PHYSICAL EXAM:      Physical Exam  Vitals and nursing note reviewed  Constitutional:       General: She is not in acute distress  Appearance: She is well-developed  She is not diaphoretic  HENT:      Head: Normocephalic and atraumatic  Eyes:      General: No scleral icterus  Right eye: No discharge  Left eye: No discharge  Conjunctiva/sclera: Conjunctivae normal    Neck:      Vascular: No JVD  Cardiovascular:      Rate and Rhythm: Normal rate and regular rhythm  Heart sounds: No murmur heard  No friction rub  No gallop  Pulmonary:      Effort: Pulmonary effort is normal  No respiratory distress  Breath sounds: Normal breath sounds  No wheezing or rales  Abdominal:      General: Bowel sounds are normal  There is no distension  Palpations: Abdomen is soft  Tenderness: There is no abdominal tenderness  There is no rebound  Musculoskeletal:         General: No tenderness or deformity  Normal range of motion  Cervical back: Normal range of motion and neck supple  Comments: Trace pitting edema lower extremities   Skin:     General: Skin is warm and dry  Coloration: Skin is not pale  Findings: No erythema or rash  Neurological:      Mental Status: She is alert and oriented to person, place, and time        Coordination: Coordination normal    Psychiatric:         Behavior: Behavior normal          Thought Content:  Thought content normal          Judgment: Judgment normal          Medications:    Current Outpatient Medications:     amLODIPine (NORVASC) 10 mg tablet, Take 0 5 tablets (5 mg total) by mouth daily, Disp: 90 tablet, Rfl: 1    atorvastatin (LIPITOR) 40 mg tablet, Take 1 tablet (40 mg total) by mouth every evening, Disp: 90 tablet, Rfl: 1    bumetanide (BUMEX) 2 mg tablet, Take 1 tablet (2 mg total) by mouth daily, Disp: 135 tablet, Rfl: 3    clopidogrel (PLAVIX) 75 mg tablet, Take 75 mg by mouth daily, Disp: , Rfl:     levothyroxine 88 mcg tablet, Take 1 tablet (88 mcg total) by mouth daily, Disp: 90 tablet, Rfl: 1    losartan (COZAAR) 25 mg tablet, Take 2 tab in AM and 1 tab in PM, Disp: 270 tablet, Rfl: 3    metoprolol tartrate (LOPRESSOR) 25 mg tablet, Take 1 tablet (25 mg total) by mouth every 12 (twelve) hours, Disp: 180 tablet, Rfl: 1    pantoprazole (PROTONIX) 20 mg tablet, TAKE 1 TABLET(20 MG) BY MOUTH DAILY, Disp: 30 tablet, Rfl: 5    spironolactone (ALDACTONE) 25 mg tablet, Take 0 5 tablets (12 5 mg total) by mouth daily, Disp: 45 tablet, Rfl: 3    apixaban (Eliquis) 2 5 mg, Take 1 tablet (2 5 mg total) by mouth 2 (two) times a day (Patient not taking: Reported on 8/23/2022), Disp: 60 tablet, Rfl: 1    Laboratory Results:        Invalid input(s): ALBUMIN    Results for orders placed or performed in visit on 73/67/04   Basic metabolic panel   Result Value Ref Range    Sodium 139 135 - 147 mmol/L    Potassium 4 2 3 5 - 5 3 mmol/L    Chloride 107 96 - 108 mmol/L    CO2 28 21 - 32 mmol/L    ANION GAP 4 4 - 13 mmol/L    BUN 38 (H) 5 - 25 mg/dL    Creatinine 1 28 0 60 - 1 30 mg/dL    Glucose, Fasting 93 65 - 99 mg/dL    Calcium 9 6 8 3 - 10 1 mg/dL    eGFR 39 ml/min/1 73sq m

## 2022-09-01 NOTE — PROGRESS NOTES
NEPHROLOGY OFFICE VISIT   Isac Zaragoza 79 y o  female MRN: [id number]  9/1/2022    Reason for Visit: CKD III    ASSESSMENT and PLAN:    I had the pleasure of seeing Ms Romy Euceda today in the renal clinic for the continued management of CKD III      2/2020 - February with 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 on February 11th   Completed course of Keppra for seizure prophylaxis      3/30/2020 -Celesta [place] was increased to 50 mg twice a day from 50 in the morning and 25 in the evening due to high blood pressures     4/3/2020 - spironolactone 12 5 mg daily was started due to continued high blood pressure     4/2020 - patient had presented for replacement current of cranioplasty on April 6   And subsequently was admitted to the rehab center postoperatively      March 2021- patient was admitted to hospital with not feeling well and   Headache   CT scan of the head with no acute intracranial pathology  The other symptoms of memory loss were also present   Patient was referred to geriatrician      April 2021 - sciatic pain  Received brief steroid course     5/2021 - back pain  Went to ER  Given pred taper       79-year-old female with a past medical history of chronic kidney disease, venous stasis, HTN, hypothyroidism, lumbar canal stenosis, Anxiety, GERD, neuropathy, Gout, EF 03-45%, Grade 2 diastolic dysfunction who presents for follow up for CKD  To note, patient's  has now passed away in August      1) CKD III - patient also has a long standing history of HTN  Creatinine in 2013, 0 9 mg/dL  Cr early 2016 was 0 80-0 9 mg/dL; then increased starting in august 2016 to 2 3 mg/dL and has fluctuated since  CT scan in 2016, kidneys appearing normal at that time       Etiology of CKD may be long standing HTN and ATN   Baseline Cr ~ 1 1 -1 3 mg/dL     Most recent creat stable 1 28 mg/dL August 12th with stable sodium and potassium      - Urine eos 0%;   - A1c controlled prior  at 5 7% on December 8th in 2020  -U PCR stable  0 15 stable --> 0 18 gm/gm in 6/2020 --> 0 1 December 8th --> 0 12 gm/gm (3/22/2021) --> too low to quantify (6/2021) --> 0 1 6/3/2022  -SPEP unrevealing  -UPEP unrevealing  - C3, C4 unrevealing  - Renal u/s with R 10 3 cm, L 10 4 cm, renal cortex 1 cm b/l; both kidneys slightly reduced in size; lower pole of R kidney is non obstructing calculus  - Pt follows with Urology team for nephrolithiasis  - No NSAIDs, the patient is not currently taking NSAID  -nuc stress test per Cardiology in Jan 2019 unrevealing  - repeat renal u/s 1/2019 - unrevealing with exception of renal cortical atrophy; but also 6 mm non shadowing calculus      Plan:     - no changes today  - labs in 4-5 months  - appt in 5 months   -patient was advised to check blood pressures once a day for 2 weeks and hold amlodipine if blood pressures are less than 409 systolic     -I am concerned that the patient is losing weight because she is not eating well which appears to be secondary to depressed mood and lack of motivation per the patient description  -patient is feeling depressed  Is tearful  Losing weight  Patient states she understands she is depressed but is not wanting or willing to accept medications or seeing a therapist   I have offered referral to a therapist today and patient declines  Patient also ask that I do not talk directly with her primary care physician regarding my concerns  But I did explain that I will be sending my notes to the primary care physician to review  Patient stated understanding  Patient states that this is because she does not want her physicians or family to worry about her  Patient's daughter was present during the conversation       2) SOB - Volume - follows with Cardiology     - on Bumetanide     3) HTN -      - cont losartan, bumex and amlodipine, spironolactone  -  due to bradycardia, beta-blocker was held prior but given new onset atrial fibrillation around May 2022, patient was restarted on metoprolol per Cardiology team  - no changes today  See above    - see plan above  -may need to lower or hold amlodipine soon     4) hypothyroid - as per Primary Care Physician     5) HPL - Primary Care following       6) Electrolytes - stable     7) MBD -     - Vit D 50 7 in nov 2019 --> 40 8 March 2022  - PTH improved 89 in nov 2019 --> 78 6 in June 2020 --> 88 3/8/2022     8) gout -      -monitor for flare     9) back pain - follows with Pain management     - pain sig improved with inj     10) Colonoscopy in feb with internal hemorrhoids and polyp removed       11) alk phos elevation     -improved     12) Anemia - Hb stable     -hemoglobin stable     13) Mitral valve calcification     - moderate to severe mitral regurgitation  -eventually may require further evaluation  Being monitored for now conservatively  - per Cardiology team     14) elevated D-dimer prior-patient was given duplex of lower extremities which was unrevealing prior     15) knee pain after fall in august 2020     - saw Orthopedic team  - steroid injection was given     16) intracranial hemorrhage with mass effect requiring craniotomy on 2/11/2020     - now is status post replacement of cranioplasty in April 2020     17)  Anxiety- patient was started on Cymbalta  PCP is attempting off of gabapentin     - there were periods of not taking cymbalta     18)   Zoster infection in November 2020-treated with Valtrex per primary team     19) a fib - new onset in May 2022    -was advised to have Holter monitor  There is concern for tachy-hola syndrome initially and Holter monitor showed periods of atrial fibrillation with RVR and nonsustained ventricular tachycardia  Was started on low-dose beta-blocker with metoprolol   -in July metoprolol was titrated further to 25 mg twice a day  -was started on Eliquis 2 5 mg twice a day  Lower dose was chosen due to high risk factors    Patient opted to remain only on Plavix and aspirin  20) poor appetite    - depressed mood  Patient states that she was offered antidepressants by her primary care physician and she has declined in the past   She does not want to see a therapist   She understands that she has depression or depressed mood but currently is not willing to undergo therapy for this      It was a pleasure evaluating your patient  Thank you for allowing our team to participate in the care of Ms Sheree Higgins  Please do not hesitate to contact our team if further issues/questions shall arise in the interim       No problem-specific Assessment & Plan notes found for this encounter  HPI:    Patient has chronic pain  Otherwise denies complaints  But during further discussion does have depressed mood  PATIENT INSTRUCTIONS:    Patient Instructions   1) Avoid NSAIDS - (Example - motrin, advil, ibuprofen, aleve, exederin, etc)  2) Always follow a low salt diet  3) please have labwork in 4-5 months  4) appointment in 5 months  6) no changes to your medications today  7) please think about seeing a therapist   8) check your blood pressures once daily  - call me if BP is less than 105 (top number) and stop amlodipine        OBJECTIVE:  Current Weight: Weight - Scale: 73 5 kg (162 lb)  Vitals:    09/01/22 1250 09/01/22 1315   BP:  110/60   Weight: 73 5 kg (162 lb)    Height: 5' 1" (1 549 m)     Body mass index is 30 61 kg/m²  REVIEW OF SYSTEMS:    Review of Systems   Constitutional: Negative  Negative for fatigue  HENT: Negative  Eyes: Negative  Respiratory: Negative  Negative for shortness of breath  Cardiovascular: Negative  Negative for leg swelling  Gastrointestinal: Negative  Endocrine: Negative  Genitourinary: Negative  Negative for difficulty urinating  Musculoskeletal: Negative  Skin: Negative  Allergic/Immunologic: Negative  Neurological: Negative  Hematological: Negative      Psychiatric/Behavioral: Depressed mood   All other systems reviewed and are negative  PHYSICAL EXAM:      Physical Exam  Vitals and nursing note reviewed  Constitutional:       General: She is not in acute distress  Appearance: She is well-developed  She is not diaphoretic  HENT:      Head: Normocephalic and atraumatic  Eyes:      General: No scleral icterus  Right eye: No discharge  Left eye: No discharge  Conjunctiva/sclera: Conjunctivae normal    Neck:      Vascular: No JVD  Cardiovascular:      Rate and Rhythm: Normal rate and regular rhythm  Heart sounds: No murmur heard  No friction rub  No gallop  Pulmonary:      Effort: Pulmonary effort is normal  No respiratory distress  Breath sounds: Normal breath sounds  No wheezing or rales  Abdominal:      General: Bowel sounds are normal  There is no distension  Palpations: Abdomen is soft  Tenderness: There is no abdominal tenderness  There is no rebound  Musculoskeletal:         General: No tenderness or deformity  Normal range of motion  Cervical back: Normal range of motion and neck supple  Comments: Trace pitting edema lower extremities   Skin:     General: Skin is warm and dry  Coloration: Skin is not pale  Findings: No erythema or rash  Neurological:      Mental Status: She is alert and oriented to person, place, and time  Coordination: Coordination normal    Psychiatric:         Behavior: Behavior normal          Thought Content:  Thought content normal          Judgment: Judgment normal          Medications:    Current Outpatient Medications:     amLODIPine (NORVASC) 10 mg tablet, Take 0 5 tablets (5 mg total) by mouth daily, Disp: 90 tablet, Rfl: 1    atorvastatin (LIPITOR) 40 mg tablet, Take 1 tablet (40 mg total) by mouth every evening, Disp: 90 tablet, Rfl: 1    bumetanide (BUMEX) 2 mg tablet, Take 1 tablet (2 mg total) by mouth daily, Disp: 135 tablet, Rfl: 3    clopidogrel (PLAVIX) 75 mg tablet, Take 75 mg by mouth daily, Disp: , Rfl:     levothyroxine 88 mcg tablet, Take 1 tablet (88 mcg total) by mouth daily, Disp: 90 tablet, Rfl: 1    losartan (COZAAR) 25 mg tablet, Take 2 tab in AM and 1 tab in PM, Disp: 270 tablet, Rfl: 3    metoprolol tartrate (LOPRESSOR) 25 mg tablet, Take 1 tablet (25 mg total) by mouth every 12 (twelve) hours, Disp: 180 tablet, Rfl: 1    pantoprazole (PROTONIX) 20 mg tablet, TAKE 1 TABLET(20 MG) BY MOUTH DAILY, Disp: 30 tablet, Rfl: 5    spironolactone (ALDACTONE) 25 mg tablet, Take 0 5 tablets (12 5 mg total) by mouth daily, Disp: 45 tablet, Rfl: 3    apixaban (Eliquis) 2 5 mg, Take 1 tablet (2 5 mg total) by mouth 2 (two) times a day (Patient not taking: Reported on 8/23/2022), Disp: 60 tablet, Rfl: 1    Laboratory Results:        Invalid input(s): ALBUMIN    Results for orders placed or performed in visit on 54/13/61   Basic metabolic panel   Result Value Ref Range    Sodium 139 135 - 147 mmol/L    Potassium 4 2 3 5 - 5 3 mmol/L    Chloride 107 96 - 108 mmol/L    CO2 28 21 - 32 mmol/L    ANION GAP 4 4 - 13 mmol/L    BUN 38 (H) 5 - 25 mg/dL    Creatinine 1 28 0 60 - 1 30 mg/dL    Glucose, Fasting 93 65 - 99 mg/dL    Calcium 9 6 8 3 - 10 1 mg/dL    eGFR 39 ml/min/1 73sq m

## 2022-09-01 NOTE — PATIENT INSTRUCTIONS
1) Avoid NSAIDS - (Example - motrin, advil, ibuprofen, aleve, exederin, etc)  2) Always follow a low salt diet  3) please have labwork in 4-5 months  4) appointment in 5 months  6) no changes to your medications today  7) please think about seeing a therapist   8) check your blood pressures once daily  - call me if BP is less than 105 (top number) and stop amlodipine

## 2022-09-07 ENCOUNTER — TELEPHONE (OUTPATIENT)
Dept: RADIOLOGY | Facility: CLINIC | Age: 82
End: 2022-09-07

## 2022-09-07 NOTE — TELEPHONE ENCOUNTER
FQ, there is a cardiology telephone task from 8/26/22 where Dr Renetta Rivas wrote that "as long as the pt knows there is a risk of stroke, she can hold plavix and ASA for 5 days prior to spinal injection "    Are you ok if Plavix is only held 5 days instead of 7 days or do we need to go back to Renetta Rivas and ask if he'll approve a 7 day hold? I will make pt and daughter aware that ASA does not need to be held  Rt L4-L5 TFESI is scheduled for 9/29/22  Pls advise

## 2022-09-07 NOTE — TELEPHONE ENCOUNTER
Dr Kenrick Mayfield,   Dr Amalia Domingo follows the [employer] Guidelines for holding anti-coagulants for neuraxial procedures and Plavix must be held for 7 days  I believe your note said pt can hold for 5 days  Would it be safe for pt to hold the Plavix for 7 days, her Aspirin would not need to be held? Dr Amalia Domingo said if you think that it is unsafe to hold for 7 days then we will not proceed with the injection  Pls advise

## 2022-09-07 NOTE — TELEPHONE ENCOUNTER
----- Message from Avis Leija MD sent at 9/7/2022  7:37 AM EDT -----  Please reach out to patient and make sure she understands 7 days for plavix    ----- Message -----  From: Carolin Bustos MD  Sent: 9/1/2022   1:05 PM EDT  To: Casie Solomon MD, #    Hello team    Pt being seen for CKD  Stable    Reason for message - pt is confused as to how long she should and can hold aspirin and plavix    Dr Wandy Carmichael note states 5 days; Pain med notes states needs 7 days       Can someone please call pt from your team to let her know what exact goals are    Thanks    np

## 2022-09-08 ENCOUNTER — TELEPHONE (OUTPATIENT)
Dept: PAIN MEDICINE | Facility: CLINIC | Age: 82
End: 2022-09-08

## 2022-09-08 NOTE — TELEPHONE ENCOUNTER
This is our second request, Mrs Prachi Escamilla would like to have her epidural injection on 9/29/22  Please advise as per below, if it is ok for patient to hold her Plavix  This patient is being considered for a neuraxial injection for the management of their pain  However, the patient is currently on an anticoagulant per your orders  The American Society of [employer]est[employer] Guideline on neuraxial procedures and anti-coagulation indicates that medication should be held as follows: Plavix 7 days prior to injection  Please advise if you ok the hold of this medication      Thank you,    Bridger Mitchell  Procedure   [facility]

## 2022-09-08 NOTE — TELEPHONE ENCOUNTER
I have no contra-indication to hold aspirin and plavix for 7 days  Patient and family know chadscvasc-6 and yearly risk of 9 8% off anticoagulation- if willing to accept risk they can hold 7 days and restart when safe

## 2022-09-08 NOTE — TELEPHONE ENCOUNTER
RN s/w Emmanuel Montiel D-I-L (ok per comm form) that Dr Cari Sims is ok with holding the Plavix for 7 days as long as family willing to accept the risk  Emmanuel Montiel said they also got a call from Dr Preet Fisher and he explained things as well  They understand the risks of being off the Plavix for 7 days prior to upcoming inj on 9/29/22 and they want to proceed  RN advised Plavix to be stopped on 9/22/22 for the inj on 9/29/22  Told Emmanuel Montiel that the ASA does not need to be stopped for this procedure  Emmanuel Montiel had no other questions

## 2022-09-29 ENCOUNTER — HOSPITAL ENCOUNTER (OUTPATIENT)
Dept: RADIOLOGY | Facility: CLINIC | Age: 82
End: 2022-09-29
Payer: MEDICARE

## 2022-09-29 VITALS
SYSTOLIC BLOOD PRESSURE: 120 MMHG | TEMPERATURE: 97.2 F | DIASTOLIC BLOOD PRESSURE: 76 MMHG | RESPIRATION RATE: 20 BRPM | HEART RATE: 75 BPM | OXYGEN SATURATION: 98 %

## 2022-09-29 DIAGNOSIS — M51.16 INTERVERTEBRAL DISC DISORDER WITH RADICULOPATHY OF LUMBAR REGION: ICD-10-CM

## 2022-09-29 PROCEDURE — 64484 NJX AA&/STRD TFRM EPI L/S EA: CPT | Performed by: ANESTHESIOLOGY

## 2022-09-29 PROCEDURE — 64483 NJX AA&/STRD TFRM EPI L/S 1: CPT | Performed by: ANESTHESIOLOGY

## 2022-09-29 RX ORDER — METHYLPREDNISOLONE ACETATE 80 MG/ML
80 INJECTION, SUSPENSION INTRA-ARTICULAR; INTRALESIONAL; INTRAMUSCULAR; PARENTERAL; SOFT TISSUE ONCE
Status: COMPLETED | OUTPATIENT
Start: 2022-09-29 | End: 2022-09-29

## 2022-09-29 RX ORDER — DULOXETIN HYDROCHLORIDE 20 MG/1
20 CAPSULE, DELAYED RELEASE ORAL DAILY
COMMUNITY

## 2022-09-29 RX ORDER — 0.9 % SODIUM CHLORIDE 0.9 %
4 VIAL (ML) INJECTION ONCE
Status: COMPLETED | OUTPATIENT
Start: 2022-09-29 | End: 2022-09-29

## 2022-09-29 RX ORDER — BUPIVACAINE HCL/PF 2.5 MG/ML
2 VIAL (ML) INJECTION ONCE
Status: COMPLETED | OUTPATIENT
Start: 2022-09-29 | End: 2022-09-29

## 2022-09-29 RX ORDER — ASPIRIN 81 MG/1
81 TABLET ORAL DAILY
COMMUNITY

## 2022-09-29 RX ADMIN — IOHEXOL 1 ML: 300 INJECTION, SOLUTION INTRAVENOUS at 09:19

## 2022-09-29 RX ADMIN — METHYLPREDNISOLONE ACETATE 80 MG: 80 INJECTION, SUSPENSION INTRA-ARTICULAR; INTRALESIONAL; INTRAMUSCULAR; PARENTERAL; SOFT TISSUE at 09:19

## 2022-09-29 RX ADMIN — Medication 4 ML: at 09:16

## 2022-09-29 RX ADMIN — BUPIVACAINE HYDROCHLORIDE 2 ML: 2.5 INJECTION, SOLUTION EPIDURAL; INFILTRATION; INTRACAUDAL at 09:19

## 2022-09-29 NOTE — H&P
History of Present Illness: The patient is a 79 y o  female who presents with complaints of right lower back and leg pain is here today for right L4-5 transforaminal epidural steroid injection    Past Medical History:   Diagnosis Date    Anxiety     Arthritis     joints    Cataract     Disease of thyroid gland     Eczema     GERD (gastroesophageal reflux disease)     Gout     Heart failure ([address] )     Hepatitis     Hiatal hernia     Hypertension     Onychomycosis     last assessed: 16    Orthopnea     resolved: 16    Pseudogout of left wrist     Sleep apnea     wears CPAP    Stroke [facility])        Past Surgical History:   Procedure Laterality Date    ABDOMINAL SURGERY          BRAIN HEMATOMA EVACUATION Right 2020    Procedure: Right decompressive craniectomy and evacuation of intraparenchymal hematoma; Surgeon: Oxana Parsons MD;  Location: [facility];  Service: Neurosurgery    BREAST SURGERY Right     cyst removal    CARPAL TUNNEL RELEASE Left     CARPAL TUNNEL RELEASE Right     CATARACT EXTRACTION       SECTION  1960    x1    COLONOSCOPY N/A 2018    Procedure: COLONOSCOPY;  Surgeon: Sebas Leblanc MD;  Location: [facility] GI LAB; Service: Gastroenterology    DILATION AND CURETTAGE OF UTERUS  1967    EYE SURGERY Left 2006    cataracts    HERNIA REPAIR      umbilical hernia    INCISIONAL HERNIA REPAIR      incarcerated    KNEE ARTHROSCOPY Right     MN REPLACE SKULL PLATE/FLAP Right 5577    Procedure: right frontotemporal replacement cranioplasty;  Surgeon: Oxana Parsons MD;  [facility]: [facility];  Service: Neurosurgery    MN XCAPSL CTRC RMVL INSJ IO LENS PROSTH W/O ECP Right 3/27/2017    Procedure: EXTRACTION EXTRACAPSULAR CATARACT PHACO INTRAOCULAR LENS (IOL);   Surgeon: Hannah Hodge MD;  Location[facility] MAIN OR;  Service: Ophthalmology         Current Outpatient Medications:     aspirin (ECOTRIN LOW STRENGTH) 81 mg EC tablet, Take 81 mg by mouth daily, Disp: , Rfl:     DULoxetine (CYMBALTA) 20 mg capsule, Take 20 mg by mouth daily, Disp: , Rfl:     amLODIPine (NORVASC) 10 mg tablet, Take 0 5 tablets (5 mg total) by mouth daily, Disp: 90 tablet, Rfl: 1    atorvastatin (LIPITOR) 40 mg tablet, Take 1 tablet (40 mg total) by mouth every evening, Disp: 90 tablet, Rfl: 1    bumetanide (BUMEX) 2 mg tablet, Take 1 tablet (2 mg total) by mouth daily, Disp: 135 tablet, Rfl: 3    clopidogrel (PLAVIX) 75 mg tablet, Take 75 mg by mouth daily, Disp: , Rfl:     levothyroxine 88 mcg tablet, Take 1 tablet (88 mcg total) by mouth daily, Disp: 90 tablet, Rfl: 1    losartan (COZAAR) 25 mg tablet, Take 2 tab in AM and 1 tab in PM, Disp: 270 tablet, Rfl: 3    metoprolol tartrate (LOPRESSOR) 25 mg tablet, Take 1 tablet (25 mg total) by mouth every 12 (twelve) hours, Disp: 180 tablet, Rfl: 1    pantoprazole (PROTONIX) 20 mg tablet, TAKE 1 TABLET(20 MG) BY MOUTH DAILY, Disp: 30 tablet, Rfl: 5    spironolactone (ALDACTONE) 25 mg tablet, Take 0 5 tablets (12 5 mg total) by mouth daily, Disp: 45 tablet, Rfl: 3    Current Facility-Administered Medications:     bupivacaine (PF) (MARCAINE) 0 25 % injection 2 mL, 2 mL, Epidural, Once, Guevara Christensen MD    iohexol (OMNIPAQUE) 300 mg/mL injection 1 mL, 1 mL, Epidural, Once, Guevara Christensen MD    lidocaine (PF) (XYLOCAINE-MPF) 2 % injection 4 mL, 4 mL, Infiltration, Once, Guevara Christensen MD    methylPREDNISolone acetate (DEPO-MEDROL) injection 80 mg, 80 mg, Epidural, Once, Guevara Christensen MD    sodium chloride (PF) 0 9 % injection 4 mL, 4 mL, Infiltration, Once, Guevara Christensen MD    No Known Allergies    Physical Exam:   Vitals:    09/29/22 0904   BP: 124/74   Pulse: 73   Resp: 20   Temp: (!) 97 2 °F (36 2 °C)   SpO2: 98%     General: Awake, Alert, Oriented x 3, Mood and affect appropriate  Respiratory: Respirations even and unlabored  Cardiovascular: Peripheral pulses intact; no edema  Musculoskeletal Exam:  Right lower back and leg pain    ASA Score: 3    Patient/Chart Verification  Patient ID Verified: Verbal  Consents Confirmed: To be obtained in the Pre-Procedure area  Allergies Reviewed: Yes  Anticoag/NSAID held?: Yes (stopped plavix on 9/22)  Currently on antibiotics?: No    Assessment:   1   Intervertebral disc disorder with radiculopathy of lumbar region        Plan: Right sided L4-L5 TFESI

## 2022-09-29 NOTE — DISCHARGE INSTR - LAB
Epidural Steroid Injection   WHAT YOU NEED TO KNOW:   An epidural steroid injection (BRET) is a procedure to inject steroid medicine into the epidural space  The epidural space is between your spinal cord and vertebrae  Steroids reduce inflammation and fluid buildup in your spine that may be causing pain  You may be given pain medicine along with the steroids  ACTIVITY  Do not drive or operate machinery today  No strenuous activity today - bending, lifting, etc   You may resume normal activites starting tomorrow - start slowly and as tolerated  You may shower today, but no tub baths or hot tubs  You may have numbness for several hours from the local anesthetic  Please use caution and common sense, especially with weight-bearing activities  CARE OF THE INJECTION SITE  If you have soreness or pain, apply ice to the area today (20 minutes on/20 minutes off)  Starting tomorrow, you may use warm, moist heat or ice if needed  You may have an increase or change in your discomfort for 36-48 hours after your treatment  Apply ice and continue with any pain medication you have been prescribed  Notify the [facility] if you have any of the following: redness, drainage, swelling, headache, stiff neck or fever above 100°F     SPECIAL INSTRUCTIONS  Our office will contact you in approximately 7 days for a progress report  MEDICATIONS  Continue to take all routine medications  Our office may have instructed you to hold some medications  As no general anesthesia was used in today's procedure, you should not experience any side effects related to anesthesia  If you are diabetic, the steroids used in today's injection may temporarily increase your blood sugar levels after the first few days after your injection  Please keep a close eye on your sugars and alert the doctor who manages your diabetes if your sugars are significantly high from your baseline or you are symptomatic       If you have a problem specifically related to your procedure, please call our office at [phone]  Problems not related to your procedure should be directed to your primary care physician

## 2022-09-30 DIAGNOSIS — I48.0 PAROXYSMAL ATRIAL FIBRILLATION (HCC): Primary | ICD-10-CM

## 2022-09-30 RX ORDER — CLOPIDOGREL BISULFATE 75 MG/1
75 TABLET ORAL DAILY
Qty: 90 TABLET | Refills: 3 | Status: SHIPPED | OUTPATIENT
Start: 2022-09-30

## 2022-10-03 DIAGNOSIS — I49.1 PAC (PREMATURE ATRIAL CONTRACTION): ICD-10-CM

## 2022-10-03 DIAGNOSIS — I48.0 PAROXYSMAL ATRIAL FIBRILLATION (HCC): ICD-10-CM

## 2022-10-03 RX ORDER — PANTOPRAZOLE SODIUM 20 MG/1
20 TABLET, DELAYED RELEASE ORAL DAILY
Qty: 90 TABLET | Refills: 1 | Status: SHIPPED | OUTPATIENT
Start: 2022-10-03 | End: 2023-01-09 | Stop reason: SDUPTHER

## 2022-10-06 ENCOUNTER — TELEPHONE (OUTPATIENT)
Dept: PAIN MEDICINE | Facility: CLINIC | Age: 82
End: 2022-10-06

## 2022-10-10 ENCOUNTER — OFFICE VISIT (OUTPATIENT)
Dept: PODIATRY | Facility: CLINIC | Age: 82
End: 2022-10-10
Payer: MEDICARE

## 2022-10-10 VITALS
HEIGHT: 61 IN | WEIGHT: 162 LBS | BODY MASS INDEX: 30.58 KG/M2 | SYSTOLIC BLOOD PRESSURE: 120 MMHG | RESPIRATION RATE: 17 BRPM | DIASTOLIC BLOOD PRESSURE: 76 MMHG

## 2022-10-10 DIAGNOSIS — M25.571 CHRONIC PAIN OF RIGHT ANKLE: Primary | ICD-10-CM

## 2022-10-10 DIAGNOSIS — M25.571 ARTHRALGIA OF RIGHT FOOT: ICD-10-CM

## 2022-10-10 DIAGNOSIS — G89.29 CHRONIC PAIN OF RIGHT ANKLE: Primary | ICD-10-CM

## 2022-10-10 DIAGNOSIS — M20.11 HALLUX VALGUS OF RIGHT FOOT: ICD-10-CM

## 2022-10-10 PROCEDURE — 20605 DRAIN/INJ JOINT/BURSA W/O US: CPT | Performed by: PODIATRIST

## 2022-10-10 NOTE — PROGRESS NOTES
Assessment/Plan:  Pain upon ambulation secondary to arthralgia of the right lower extremity  Patient has chronic pain in the right ankle  She also has inflamed hallux valgus deformity  Patient is diabetic    Plan  Foot exam performed  Patient advised on care of the diabetic foot  Today arthrocentesis done  1 cc Kenalog 10 injected into right ankle as well as 1 cc into the right inflamed bunion area  All nails debrided without pain or complication  Diagnoses and all orders for this visit:    Chronic pain of right ankle    Hallux valgus of right foot    Arthralgia of right foot          Subjective:  Patient has complaint of pain  She has pain in her right foot and ankle  She also gets ingrown toenails  Patient is diabetic    No history of trauma    No Known Allergies      Current Outpatient Medications:   •  amLODIPine (NORVASC) 10 mg tablet, Take 0 5 tablets (5 mg total) by mouth daily, Disp: 90 tablet, Rfl: 1  •  aspirin (ECOTRIN LOW STRENGTH) 81 mg EC tablet, Take 81 mg by mouth daily, Disp: , Rfl:   •  atorvastatin (LIPITOR) 40 mg tablet, Take 1 tablet (40 mg total) by mouth every evening, Disp: 90 tablet, Rfl: 1  •  bumetanide (BUMEX) 2 mg tablet, Take 1 tablet (2 mg total) by mouth daily, Disp: 135 tablet, Rfl: 3  •  clopidogrel (PLAVIX) 75 mg tablet, Take 1 tablet (75 mg total) by mouth daily, Disp: 90 tablet, Rfl: 3  •  DULoxetine (CYMBALTA) 20 mg capsule, Take 20 mg by mouth daily, Disp: , Rfl:   •  levothyroxine 88 mcg tablet, Take 1 tablet (88 mcg total) by mouth daily, Disp: 90 tablet, Rfl: 1  •  losartan (COZAAR) 25 mg tablet, Take 2 tab in AM and 1 tab in PM, Disp: 270 tablet, Rfl: 3  •  metoprolol tartrate (LOPRESSOR) 25 mg tablet, Take 1 tablet (25 mg total) by mouth every 12 (twelve) hours, Disp: 180 tablet, Rfl: 1  •  pantoprazole (PROTONIX) 20 mg tablet, Take 1 tablet (20 mg total) by mouth daily, Disp: 90 tablet, Rfl: 1  •  spironolactone (ALDACTONE) 25 mg tablet, Take 0 5 tablets (12 5 mg total) by mouth daily, Disp: 45 tablet, Rfl: 3    Patient Active Problem List   Diagnosis   • Benign essential hypertension   • Chronic diastolic (congestive) heart failure (HCC)   • Hypothyroidism   • Arthropathy of knee   • Hallux abductovalgus with bunions, unspecified laterality   • CKD (chronic kidney disease), stage III (HCC)   • Diverticulitis of colon   • Chronic gout without tophus   • Hiatal hernia   • Hyperlipemia   • Hyperuricemia   • Nephrolithiasis   • WENDI (obstructive sleep apnea)   • Pseudogout of left wrist   • Anxiety   • Gastroesophageal reflux disease without esophagitis   • Exertional shortness of breath   • Medicare annual wellness visit, subsequent   • Prediabetes   • Status post right frontotemporal replacement cranioplasty   • BMI 38 0-38 9,adult   • Sciatica of left side   • Spinal stenosis of lumbar region with neurogenic claudication          Patient ID: Yonas Murray is a 79 y o  female  HPI    The following portions of the patient's history were reviewed and updated as appropriate:     family history includes Arthritis in her family and mother; Coronary artery disease in her mother; Diabetes in her brother, mother, and son; Hypertension in her father and mother  reports that she has never smoked  She has never used smokeless tobacco  She reports current alcohol use  She reports that she does not use drugs  Vitals:    10/10/22 1124   BP: 120/76   Resp: 17       Review of Systems      Objective:  Patient's shoes and socks removed  Foot ExamPhysical Exam  Cardiovascular:      Pulses: Pulses are weak  Assessment   Pain upon ambulation   Mycosis of nail   Peripheral artery disease   Callus formation      Plan    All nails debrided   Plantar calluses debrided without pain or complication   Add topical antifungal           History of Present Illness   HPI: Patient complains of pain in her feet with ambulation  She is pain around the big toe joints   There is no history of trauma  Today the patient is concerned with her swollen legs  She does not have pain in her legs, however, they are itchy and red  She suffers from edema  She does take water pill  Patient is now been diagnosed with renal compromise       Review of Systems        Constitutional: No fever, no chills, feels well, no tiredness, no recent weight gain or loss       Eyes: No complaints of eyesight problems, no red eyes       ENT: no loss of hearing, no nosebleeds, no sore throat       Cardiovascular: No complaints of chest pain, no palpitations, no leg claudication or lower extremity edema       Respiratory: no compliants of shortness of breath, no wheezing, no cough       Gastrointestinal: no complaints of abdominal pain, no constipation, no nausea or diarrhea, no vomiting, no bloody stools       Genitourinary: no complaints of dysuria, no incontinence       Musculoskeletal: arthralgias,-- limb pain-- and-- myalgias, but-- as noted in HPI       Integumentary: no complaints of skin rash or lesion, no itching or dry skin, no skin wounds       Neurological: numbness-- and-- tingling, but-- no complaints of headache, no confusion, no numbness or tingling, no dizziness-- and-- as noted in HPI       Endocrine: No complaints of muscle weakness, no feelings of weakness, no frequent urination, no excessive thirst-- and-- as noted in HPI   feelings of weakness      Psychiatric: No suicidal thoughts, no anxiety, no feelings of depression       Active Problems   1  Abdominal pain (789 00) (R10 9)   2  Acute bronchitis (466 0) (J20 9)   3  Acute on chronic diastolic congestive heart failure (428 33,428 0) (I50 33)   4  Ankle pain, unspecified laterality   5  Arthropathy of knee (716 96) (M17 10)   6  Atherosclerosis of arteries of extremities (440 20) (I70 209)   7  Benign essential hypertension (401 1) (I10)   8  Breast pain (611 71) (N64 4)   9  Bunion, unspecified laterality   10  Callus (700) (L84)   11  Cellulitis (682  9) (L03 90)   12  Chronic low back pain (724 2,338 29) (M54 5,G89 29)   13  Chronic reflux esophagitis (530 11) (K21 0)   14  Chronic venous insufficiency (459 81) (I87 2)   15  CKD (chronic kidney disease), stage III (585 3) (N18 3)   90  FOYMZVWO systolic and diastolic HF (heart failure) (428 40) (I50 40)   17  Dehydration (276 51) (E86 0)   18  Dermatitis (692 9) (L30 9)   19  Difficulty in walking (719 7) (R26 2)   20  Discoloration of nail (703 8) (L60 8)   21  Diverticulitis of colon (562 11) (K57 32)   22  Dizziness (780 4) (R42)   23  Dyspnea on exertion (786 09) (R06 09)   24  Dystrophic nail (703 8) (L60 3)   25  Dysuria (788 1) (R30 0)   26  Edema (782 3) (R60 9)   27  Electrolyte or fluid disorder (276 9) (E87 8)   28  Elevated triglycerides with high cholesterol (272 2) (E78 2)   29  Encounter for screening for cardiovascular disorders (V81 2) (Z13 6)   30  Encounter for screening mammogram for breast cancer (V76 12) (Z12 31)   31  Esophageal reflux (530 81) (K21 9)   32  Flu vaccine need (V04 81) (Z23)   33  Flu vaccine need (V04 81) (Z23)   34  Gout (274 9) (M10 9)   35  Headache (784 0) (R51)   36  Hiatal hernia (553 3) (K44 9)   37  Hyperlipemia (272 4) (E78 5)   38  Hypertension (401 9) (I10)   39  Hyperuricemia (790 6) (E79 0)   40  Hypothyroidism (244 9) (E03 9)   41  Infectious diarrhea (009 2) (A09)   42  Knee pain (719 46) (M25 569)   43  Leg swelling (729 81) (M79 89)   44  Localized osteoarthritis of knees, bilateral (715 36) (M17 0)   45  Lumbar canal stenosis (724 02) (M48 061)   46  Lumbar disc herniation with radiculopathy (722 10) (M51 16)   47  Lumbar radiculopathy (724 4) (M54 16)   48  Lumbar stenosis with neurogenic claudication (724 03) (M48 062)   49  Lymphedema (457 1) (I89 0)   50  Medicare annual wellness visit, initial (V70 0) (Z00 00)   51  Medicare annual wellness visit, subsequent (V70 0) (Z00 00)   52  Morbid obesity with body mass index of 40 0-44 9 in adult (278 01,V85 41)      (E66 01,Z68 41)   53  Nails Onychauxis (703 8)   54  Nausea (787 02) (R11 0)   55  Need for pneumococcal vaccination (V03 82) (Z23)   56  Need for prophylactic vaccination and inoculation against influenza (V04 81) (Z23)   57  Need for shingles vaccine (V04 89) (Z23)   58  Nephrolithiasis (592 0) (N20 0)   59  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)   60  Obstructive sleep apnea (327 23) (G47 33)   61  Onychomycosis (110 1) (B35 1)   62  Orthopnea (786 02) (R06 01)   63  Osteoarthritis, localized, knee (715 36) (M17 10)   64  Osteoporosis screening (V82 81) (Z13 820)   65  Other specified anxiety disorders (300 09) (F41 8)   66  Other specified inflammatory spondylopathies, 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) (M46 80)   67  Overweight (278 02) (E66 3)   68  Pain in both feet (729 5) (M79 671,M79 672)   69  Pain in wrist joint (719 43) (M25 539)   70  Palpitations (785 1) (R00 2)   71  Pes planus, unspecified laterality (734) (M21 40)   72  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)   73  Pseudogout of left wrist (626 29,703 78) (M11 232)   74  Renal disorder (593 9) (N28 9)   75  Rupture of popliteal cyst (727 51) (M66 0)   76  Screening for colon cancer (V76 51) (Z12 11)   77  Screening for diabetes mellitus (DM) (V77 1) (Z13 1)   78  Screening for genitourinary condition (V81 6) (Z13 89)   79  Screening for malignant neoplasm of breast (V76 10) (Z12 31)   80  Short unilateral neuralgiform headache, conjunctival injection/tearing (339 05) (G44 059)   81  Somnolence, daytime (780 54) (R40 0)   82  Spinal stenosis of lumbar region (724 02) (M48 061)   83  Tarsal tunnel syndrome (355 5) (G57 50)   84  Tenderness in limb (729 5) (M79 609)   85  Tinea pedis (110 4) (I50 7)   86  Umbilical hernia (844 9) (K42 9)   87  Visit for screening mammogram (V76 12) (Z12 31)     Past Medical History    · Benign essential hypertension (401 1) (I10)   · Depression screen (V79 0) (Z13 89)   · Hiatal hernia (553 3) (K44 9)   · History of abdominal pain (V13 89) (B08 135)   · History of abdominal pain (V13 89) (I23 838)   · History of arthritis (V13 4) (Z87 39)   · History of backache (V13 59) (Z87 39)   · History of cataract (V12 49) (Z86 69)   · History of eczema (V13 3) (Z87 2)   · History of heart failure (V12 59) (Z86 79)   · History of hepatitis (V12 09) (Z86 19)   · History of onychomycosis (V12 09) (Z86 19)   · History of sleep apnea (V13 89) (Z86 69)   · History of Orthopnea (786 02) (R06 01)     The active problems and past medical history were reviewed and updated today       Surgical History    · History of Complete Colonoscopy   · History of Hysterectomy   · History of Incisional Hernia Repair - Incarcerated   · History of Knee Surgery     The surgical history was reviewed and updated today        Family History   Mother    · Family history of Coronary artery disease   · Family history of arthritis (V17 7) (Z82 61)   · Denied: Family history of depression   · Family history of diabetes mellitus (V18 0) (Z83 3)   · Family history of hypertension (V17 49) (Z82 49)   · Denied: Family history of substance abuse  Father    · Denied: Family history of depression   · Family history of hypertension (V17 49) (Z82 49)   · Denied: Family history of substance abuse  Sibling    · Denied: Family history of depression   · Denied: Family history of substance abuse  Family History    · Family history of arthritis (V17 7) (Z82 61)     The family history was reviewed and updated today        Social History    · Denied: History of Alcohol Use (History)   · Daily Coffee Consumption (___ Cups/Day)   · Lack of exercise (V69 0) (Z72 3)   ·    · Never a smoker   · Never a smoker   · Sleeps 6 -7 hours a day  The social history was reviewed and updated today   The social history was reviewed and is unchanged     The medication list was reviewed and updated today       Allergies   1  No Known Drug Allergies     Physical Exam   Left Foot: Appearance: Normal except as noted: excessive pronation-- and-- pes planus     Right Foot: Appearance: Normal except as noted: excessive pronation-- and-- pes planus  Tenderness: None except the calcaneous,-- medial calcaneous-- and-- insertion of the plantar fascia   Patient has an enlarged hallux valgus deformity with inflamed bunion  Left Ankle: Appearance: Normal except ecchymosis-- and-- swelling laterally  ROM: limited ROM in all planes    Right Ankle: ROM: limited ROM in all planes Motor: diffuse weakness  Pain with palpation anterior lateral aspect right ankle joint mortise     Neurological Exam: performed  Light touch was intact bilaterally  Vibratory sensation was intact bilaterally  Response to monofilament test was intact bilaterally  Deep tendon reflexes: patellar reflex present bilaterally-- and-- achilles reflex present bilaterally     Vascular Exam: performed Dorsalis pedis pulses were 1/4 bilaterally  Posterior tibial pulses were 1/4 bilaterally  Elevation Pallor: diminished bilaterally  Capillary refill time was greater than 3 seconds bilaterally-- and-- Q9 findings bilateral  Negative digital hair noted  Positive abnormal cooling bilateral  Edema: moderate bilaterally-- and-- 6/7 pitting edema  Negative Homans sign     Toenails: All of the toenails were elongated,-- hypertrophied,-- discolored-- and-- Mycotic with onychogryphosis  Note is made of bilateral tinea pedis in moccasin foot  Distribution          Socks and shoes removed, Right Foot Findings: swollen, erythematous and dry       The sensory exam showed diminished vibratory sensation at the level of the toes  Diminished tactile sensation with monofilament testing throughout the right foot       Socks and shoes removed, Left Foot Findings: swollen, erythematous and dry       The sensory exam showed diminished vibratory sensation at the level of the toes   Diminished tactile sensation with monofilament testing throughout the left foot      Capillary refills findings on the right were delayed in the toes       Pulses:      1+ in the posterior tibialis on the right      1+ in the dorsalis pedis on the right       Capillary refills findings on the left were delayed in the toes       Pulses:      1+ in the posterior tibialis on the left      1+ in the dorsalis pedis on the left       Assign Risk Category: 2: Loss of protective sensation with or without weakness, deformity, callus, pre-ulcer, or history of ulceration  High risk     Hyperkeratosis: present on both first toes,-- present on both first sub metatarsals-- and-- Bilateral plantar moccasin tinea pedis noted     Shoe Gear Evaluation: performed ()   Recommendation(s): SAS style          Assign Risk Category  Deformity present  Loss of protective sensation  Weak pulses  Risk: 2

## 2022-10-12 PROBLEM — Z00.00 MEDICARE ANNUAL WELLNESS VISIT, SUBSEQUENT: Status: RESOLVED | Noted: 2019-03-08 | Resolved: 2022-10-12

## 2022-10-16 DIAGNOSIS — I10 BENIGN ESSENTIAL HTN: ICD-10-CM

## 2022-10-17 RX ORDER — LOSARTAN POTASSIUM 25 MG/1
TABLET ORAL
Qty: 270 TABLET | Refills: 3 | Status: SHIPPED | OUTPATIENT
Start: 2022-10-17

## 2022-11-04 ENCOUNTER — HOSPITAL ENCOUNTER (OUTPATIENT)
Dept: NON INVASIVE DIAGNOSTICS | Facility: HOSPITAL | Age: 82
Discharge: HOME | End: 2022-11-04
Attending: INTERNAL MEDICINE

## 2022-11-04 VITALS
SYSTOLIC BLOOD PRESSURE: 124 MMHG | DIASTOLIC BLOOD PRESSURE: 74 MMHG | HEART RATE: 82 BPM | BODY MASS INDEX: 30.58 KG/M2 | WEIGHT: 162 LBS | HEIGHT: 61 IN

## 2022-11-04 DIAGNOSIS — I50.32 CHRONIC DIASTOLIC (CONGESTIVE) HEART FAILURE (HCC): ICD-10-CM

## 2022-11-04 DIAGNOSIS — R06.02 EXERTIONAL SHORTNESS OF BREATH: ICD-10-CM

## 2022-11-04 DIAGNOSIS — I34.0 MODERATE TO SEVERE MITRAL REGURGITATION: ICD-10-CM

## 2022-11-04 LAB
AORTIC VALVE MEAN VELOCITY: 14.2 M/S
APICAL FOUR CHAMBER EJECTION FRACTION: 72 %
AV AREA BY CONTINUOUS VTI: 0.8 CM2
AV AREA PEAK VELOCITY: 0.8 CM2
AV LVOT MEAN GRADIENT: 3 MMHG
AV LVOT PEAK GRADIENT: 5 MMHG
AV MEAN GRADIENT: 9 MMHG
AV PEAK GRADIENT: 18 MMHG
AV REGURGITATION PRESSURE HALF TIME: 330 MS
AV VALVE AREA: 0.83 CM2
AV VELOCITY RATIO: 0.5
DOP CALC AO PEAK VEL: 2.13 M/S
DOP CALC AO VTI: 50.1 CM
DOP CALC LVOT AREA: 1.54 CM2
DOP CALC LVOT DIAMETER: 1.4 CM
DOP CALC LVOT PEAK VEL VTI: 26.9 CM
DOP CALC LVOT PEAK VEL: 1.06 M/S
DOP CALC LVOT STROKE INDEX: 24.9 ML/M2
DOP CALC LVOT STROKE VOLUME: 41.39 CM3
DOP CALC MV VTI: 58 CM
FRACTIONAL SHORTENING: 27 % (ref 28–44)
INTERVENTRICULAR SEPTUM IN DIASTOLE (PARASTERNAL SHORT AXIS VIEW): 0.9 CM
INTERVENTRICULAR SEPTUM: 0.9 CM (ref 0.6–1.1)
LAAS-AP2: 29 CM2
LAAS-AP4: 33.8 CM2
LEFT ATRIUM AREA SYSTOLE SINGLE PLANE A4C: 35.1 CM2
LEFT ATRIUM SIZE: 5.4 CM
LEFT INTERNAL DIMENSION IN SYSTOLE: 3.3 CM (ref 2.1–4)
LEFT VENTRICULAR INTERNAL DIMENSION IN DIASTOLE: 4.5 CM (ref 3.5–6)
LEFT VENTRICULAR POSTERIOR WALL IN END DIASTOLE: 1 CM
LEFT VENTRICULAR STROKE VOLUME: 51 ML
LVSV (TEICH): 51 ML
MITRAL REGURGITATION PEAK VELOCITY: 5.17 M/S
MITRAL VALVE MEAN INFLOW VELOCITY: 4.17 M/S
MITRAL VALVE REGURGITANT PEAK GRADIENT: 107 MMHG
MV MEAN GRADIENT: 4 MMHG
MV PEAK GRADIENT: 19 MMHG
MV VALVE AREA BY CONTINUITY EQUATION: 0.71 CM2
RIGHT ATRIUM AREA SYSTOLE A4C: 17.7 CM2
RIGHT VENTRICLE ID DIMENSION: 2.4 CM
SL CV AV DECELERATION TIME RETROGRADE: 1137 MS
SL CV AV PEAK GRADIENT RETROGRADE: 45 MMHG
SL CV DOP CALC MV VTI RETROGRADE: 177.7 CM
SL CV LEFT ATRIUM LENGTH A2C: 7.4 CM
SL CV MV MEAN GRADIENT RETROGRADE: 75 MMHG
SL CV PED ECHO LEFT VENTRICLE DIASTOLIC VOLUME (MOD BIPLANE) 2D: 94 ML
SL CV PED ECHO LEFT VENTRICLE SYSTOLIC VOLUME (MOD BIPLANE) 2D: 43 ML
TR MAX PG: 43 MMHG
TR PEAK VELOCITY: 3.3 M/S
TRICUSPID VALVE PEAK REGURGITATION VELOCITY: 3.27 M/S

## 2022-11-14 DIAGNOSIS — I34.0 MODERATE TO SEVERE MITRAL REGURGITATION: ICD-10-CM

## 2022-11-14 DIAGNOSIS — Z79.01 CURRENT USE OF LONG TERM ANTICOAGULATION: ICD-10-CM

## 2022-11-14 DIAGNOSIS — I49.1 PAC (PREMATURE ATRIAL CONTRACTION): ICD-10-CM

## 2022-11-14 DIAGNOSIS — I48.0 PAROXYSMAL ATRIAL FIBRILLATION (HCC): ICD-10-CM

## 2022-11-30 DIAGNOSIS — I10 BENIGN ESSENTIAL HTN: ICD-10-CM

## 2022-11-30 DIAGNOSIS — I10 BENIGN ESSENTIAL HYPERTENSION: ICD-10-CM

## 2022-11-30 DIAGNOSIS — I50.33 ACUTE ON CHRONIC DIASTOLIC CONGESTIVE HEART FAILURE (HCC): ICD-10-CM

## 2022-11-30 RX ORDER — SPIRONOLACTONE 25 MG/1
12.5 TABLET ORAL DAILY
Qty: 45 TABLET | Refills: 3 | Status: SHIPPED | OUTPATIENT
Start: 2022-11-30

## 2022-11-30 RX ORDER — BUMETANIDE 2 MG/1
2 TABLET ORAL DAILY
Qty: 90 TABLET | Refills: 3 | Status: SHIPPED | OUTPATIENT
Start: 2022-11-30

## 2022-11-30 RX ORDER — AMLODIPINE BESYLATE 10 MG/1
5 TABLET ORAL DAILY
Qty: 90 TABLET | Refills: 0 | Status: SHIPPED | OUTPATIENT
Start: 2022-11-30

## 2022-12-19 ENCOUNTER — OFFICE VISIT (OUTPATIENT)
Dept: CARDIOLOGY CLINIC | Facility: CLINIC | Age: 82
End: 2022-12-19

## 2022-12-19 VITALS
BODY MASS INDEX: 31.15 KG/M2 | HEART RATE: 96 BPM | OXYGEN SATURATION: 98 % | WEIGHT: 165 LBS | DIASTOLIC BLOOD PRESSURE: 62 MMHG | SYSTOLIC BLOOD PRESSURE: 110 MMHG | TEMPERATURE: 97.3 F | HEIGHT: 61 IN

## 2022-12-19 DIAGNOSIS — R60.0 BILATERAL LEG EDEMA: ICD-10-CM

## 2022-12-19 DIAGNOSIS — I34.0 MODERATE TO SEVERE MITRAL REGURGITATION: ICD-10-CM

## 2022-12-19 DIAGNOSIS — I50.32 CHRONIC DIASTOLIC (CONGESTIVE) HEART FAILURE (HCC): ICD-10-CM

## 2022-12-19 DIAGNOSIS — I10 BENIGN ESSENTIAL HYPERTENSION: ICD-10-CM

## 2022-12-19 DIAGNOSIS — I48.91 ATRIAL FIBRILLATION BY ELECTROCARDIOGRAM (HCC): ICD-10-CM

## 2022-12-19 DIAGNOSIS — I49.1 PAC (PREMATURE ATRIAL CONTRACTION): ICD-10-CM

## 2022-12-19 DIAGNOSIS — I48.19 PERSISTENT ATRIAL FIBRILLATION (HCC): Primary | ICD-10-CM

## 2022-12-19 NOTE — PROGRESS NOTES
Cardiology Follow Up  Blaise Grajeda [facility]  1940  [id number]  [facility] AND [facility]  [facility]  [address] [address] [id number]    1  Persistent atrial fibrillation (HCC)  POCT ECG      2  Benign essential hypertension  POCT ECG      3  Moderate to severe mitral regurgitation  POCT ECG    apixaban (Eliquis) 2 5 mg      4  PAC (premature atrial contraction)  POCT ECG      5  Chronic diastolic (congestive) heart failure (HCC)  POCT ECG    apixaban (Eliquis) 2 5 mg      6  Bilateral leg edema  POCT ECG      7  Atrial fibrillation by electrocardiogram (HCC)  apixaban (Eliquis) 2 5 mg         Discussion/Plan:  Atrial fibrillation persistent- concern for tachy-hola  Event monitor with periods of AFib with RVR rest   Also with nonsustained ventricular tachycardia  Continue metoprolol 12 5 mg twice a day  She has had no major bleeding on aspirin and Plavix  She is going to now try Eliquis 2 5 mg  She uses a cane for ambulation  She has had no recent falls  At this point the risk of thromboembolism outweighs the risk of bleeding  Moderate to severe mitral regurgitation- blood pressure optimized  Continue amlodipine 10 mg  Losartan 50 mg morning and 50mgh in evening  Bumex 2 mg daily  We discussed about tight blood pressure control  She will continue with a low-salt diet  Her blood pressures have been optimized  Possible consideration for MitraClip evaluation  IF worsens or symptoms, plan to consider mitral clip    Intraparenchymal bleed/cranioplasty- previous history which makes her at higher risk for anticoagulation usage  Acute on chronic diastolic heart failure with a component of lymphedema- improved volume status   Lower ext better  - Weight loss  - Compression socks + wedge pillow  - Increase bumex 2mg daily +  - Low-salt diet   - Elevation of legs + walking  -- if with continued lower extremity edema consideration for compression boots    Chronic dyspnea-I believe this is multifactorial including chronic deconditioning from severe back pain  BMI of 41  Some retained lower extremity edema  Her PFTs were reviewed which also show some restriction  She needs to started structured exercise program   Possible swimming or water sports given her severe back discomfort     Sleep apnea  -- cpap mask titration    Incomplete rbbb    Acute on chronic kidney disease follow-up with renal  - continue cozaar +  - Bumex 2mg daily    Triglycerides- controlled  - 4000 mg omega-3 daily    WENDI- on cpap      Interval History:  She denies having chest pain  Her edema is better  Taking omega 3 currently  No bleeding or bruising  No dizziness or light-headness  Blood pressure very well controlled  Has back pain  12/11/2018:  She reports having some exertional shortness of breath  Her lower extremity edema has improved but is persistent  She denies feeling dizziness or lightheadedness  She denies having any falls  She denies having any bleeding or bruising     06/12/2019:  She reports some improvement in her shortness of breath but still has dyspnea while sitting  She is unable to walk secondary to severe back pain  She denies feeling dizziness or lightheadedness  Her lower extremity edema has improved  She is trying a elevator legs  She does wear compression socks  She is compliant with CPAP     31/20: [de-identified]year-old with recent intraparenchymal hemorrhage status post right decompressive craniectomy and evacuation of intraparenchymal hematoma  She is pending follow-up bone flap by neuro surgery  She tolerated her previous procedure without evidence of decompensated heart failure  She was noted to have some bradycardia and her beta-blocker was discontinued  She is compliant with her antihypertensive medications  She is compliant with her diuretics    She denies having significant worsening in her chronic dyspnea or lower extremity edema    02/24/2021:  She denies currently feeling significant shortness of breath  She reports her lower extremity swelling is well controlled  We reviewed through her recent EKG  No prior history of atrial fibrillation  She is compliant with her medications  Denies having any falls  06/07/2021:  Her weight has been stable  She denies having major lower extremity swelling  We reviewed through her last echocardiogram   Reviewed through her Holter monitor which did not show evidence of atrial fibrillation or atrial flutter  She is compliant with her CPAP  She is compliant with her diuretics  Reviewed her last lab work  01/21/2022:  Her blood pressures have been controlled  She denies having major shortness of breath  She denies having chest heaviness  We reviewed through her recent echocardiogram   No major change  05/27/2022:  She had a mechanical fall 2 weeks ago  She is now seen in atrial fibrillation newly recognized  Her last event monitor from last year was negative for atrial fibrillation  She denies having major palpitations  She has chronic shortness of breath  She states her weight has been stable on her lower extremity swelling has been also stable  She has been compliant with her diuretics  She denies feeling dizziness  06/21/2022:  She denies having recurrence of fall  She still has some exertional dyspnea  However overall she is feeling well  We reviewed her event monitor  S she is awaiting CPAP mask titration    08/04/2022:  She denies having major bleeding  She is wearing her CPAP at night  She denies having any falls  We reviewed through her event monitor  12/19/2022: She is compliant with her CPAP  Her breathing has been stable  She denies major bleeding  She states lower extremity swelling has been stable      Patient Active Problem List   Diagnosis   • Benign essential hypertension   • Chronic diastolic (congestive) heart failure ([facility] [address] ) • Hypothyroidism   • Arthropathy of knee   • Hallux abductovalgus with bunions, unspecified laterality   • CKD (chronic kidney disease), stage III (HCC)   • Diverticulitis of colon   • Chronic gout without tophus   • Hiatal hernia   • Hyperlipemia   • Hyperuricemia   • Nephrolithiasis   • WENDI (obstructive sleep apnea)   • Pseudogout of left wrist   • Anxiety   • Gastroesophageal reflux disease without esophagitis   • Exertional shortness of breath   • Prediabetes   • Status post right frontotemporal replacement cranioplasty   • BMI 38 0-38 9,adult   • Sciatica of left side   • Spinal stenosis of lumbar region with neurogenic claudication     Past Medical History:   Diagnosis Date   • Anxiety    • Arthritis     joints   • Cataract    • Disease of thyroid gland    • Eczema    • GERD (gastroesophageal reflux disease)    • Gout    • Heart failure (HCC)    • Hepatitis    • Hiatal hernia    • Hypertension    • Onychomycosis     last assessed: 4/29/16   • Orthopnea     resolved: 1/8/16   • Pseudogout of left wrist    • Sleep apnea     wears CPAP   • Stroke [facility])      Social History     Socioeconomic History   • Marital status:      Spouse name: Not on file   • Number of children: Not on file   • Years of education: Not on file   • Highest education level: Not on file   Occupational History   • Not on file   Tobacco Use   • Smoking status: Never   • Smokeless tobacco: Never   Vaping Use   • Vaping Use: Never used   Substance and Sexual Activity   • Alcohol use: Yes     Alcohol/week: 0 0 standard drinks     Comment: Rare     • Drug use: Never   • Sexual activity: Not Currently     Birth control/protection: Post-menopausal   Other Topics Concern   • Not on file   Social History Narrative    Daily coffee consumption    Lack of exercise    Sleeps 6-7 hours a day     Social Determinants of Health     Financial Resource Strain: Not on file   Food Insecurity: Not on file   Transportation Needs: Not on file   Physical Activity: Not on file   Stress: Not on file   Social Connections: Not on file   Intimate Partner Violence: Not on file   Housing Stability: Not on file      Family History   Problem Relation Age of Onset   • Diabetes Mother    • Coronary artery disease Mother    • Arthritis Mother    • Hypertension Mother    • Hypertension Father    • Diabetes Brother    • Diabetes Son    • Arthritis Family      Past Surgical History:   Procedure Laterality Date   • ABDOMINAL SURGERY         • BRAIN HEMATOMA EVACUATION Right 2020    Procedure: Right decompressive craniectomy and evacuation of intraparenchymal hematoma; Surgeon: Mena Dela Cruz MD;  Location: [facility];  Service: Neurosurgery   • BREAST SURGERY Right     cyst removal   • CARPAL TUNNEL RELEASE Left    • CARPAL TUNNEL RELEASE Right    • CATARACT EXTRACTION     •  SECTION  1960    x1   • COLONOSCOPY N/A 2018    Procedure: COLONOSCOPY;  Surgeon: Cory Palomo MD;  Location: [facility] GI LAB; Service: Gastroenterology   • DILATION AND CURETTAGE OF UTERUS     • EYE SURGERY Left     cataracts   • HERNIA REPAIR      umbilical hernia   • INCISIONAL HERNIA REPAIR      incarcerated   • KNEE ARTHROSCOPY Right    • NH REPLACE SKULL PLATE/FLAP Right 3549    Procedure: right frontotemporal replacement cranioplasty;  Surgeon: Mena Dela Cruz MD;  Location:  MAIN OR;  Service: Neurosurgery   • NH XCAPSL CTRC RMVL INSJ IO LENS PROSTH W/O ECP Right 3/27/2017    Procedure: EXTRACTION EXTRACAPSULAR CATARACT PHACO INTRAOCULAR LENS (IOL);   Surgeon: Elida Adams MD;  Location: [facility] MAIN OR;  Service: Ophthalmology       Current Outpatient Medications:   •  amLODIPine (NORVASC) 10 mg tablet, Take 0 5 tablets (5 mg total) by mouth daily, Disp: 90 tablet, Rfl: 0  •  aspirin (ECOTRIN LOW STRENGTH) 81 mg EC tablet, Take 81 mg by mouth daily, Disp: , Rfl:   •  atorvastatin (LIPITOR) 40 mg tablet, Take 1 tablet (40 mg total) by mouth every evening, Disp: 90 tablet, Rfl: 1  •  bumetanide (BUMEX) 2 mg tablet, Take 1 tablet (2 mg total) by mouth daily, Disp: 90 tablet, Rfl: 3  •  clopidogrel (PLAVIX) 75 mg tablet, Take 1 tablet (75 mg total) by mouth daily, Disp: 90 tablet, Rfl: 3  •  levothyroxine 88 mcg tablet, Take 1 tablet (88 mcg total) by mouth daily, Disp: 90 tablet, Rfl: 1  •  losartan (COZAAR) 25 mg tablet, TAKE 2 TABLETS IN THE MORNING AND TAKE 1 TABLET IN THE EVENING, Disp: 270 tablet, Rfl: 3  •  metoprolol tartrate (LOPRESSOR) 25 mg tablet, Take 1 tablet (25 mg total) by mouth every 12 (twelve) hours (Patient taking differently: Take 12 5 mg by mouth every 12 (twelve) hours), Disp: 180 tablet, Rfl: 0  •  pantoprazole (PROTONIX) 20 mg tablet, Take 1 tablet (20 mg total) by mouth daily, Disp: 90 tablet, Rfl: 1  •  spironolactone (ALDACTONE) 25 mg tablet, Take 0 5 tablets (12 5 mg total) by mouth daily, Disp: 45 tablet, Rfl: 3  •  DULoxetine (CYMBALTA) 20 mg capsule, Take 20 mg by mouth daily (Patient not taking: Reported on 12/19/2022), Disp: , Rfl:   No Known Allergies    Review of Systems:  Review of Systems   Constitutional: Positive for fatigue  Negative for activity change, appetite change, chills, diaphoresis, fever and unexpected weight change  HENT: Negative  Negative for congestion, dental problem, drooling, ear discharge, ear pain, facial swelling, hearing loss, mouth sores, nosebleeds, postnasal drip, rhinorrhea, sinus pressure, sinus pain, sneezing, sore throat, tinnitus, trouble swallowing and voice change  Eyes: Negative  Negative for photophobia, pain, redness, itching and visual disturbance  Respiratory: Positive for shortness of breath  Negative for apnea, cough, choking, chest tightness, wheezing and stridor  Cardiovascular: Positive for leg swelling  Negative for chest pain and palpitations  Gastrointestinal: Negative    Negative for abdominal distention, abdominal pain, anal bleeding, blood in stool, constipation, diarrhea, nausea, rectal pain and vomiting  Endocrine: Negative  Negative for cold intolerance, heat intolerance, polydipsia, polyphagia and polyuria  Genitourinary: Negative  Negative for decreased urine volume, difficulty urinating, dyspareunia, dysuria, enuresis, flank pain, frequency, genital sores, hematuria, menstrual problem, pelvic pain, urgency, vaginal bleeding, vaginal discharge and vaginal pain  Musculoskeletal: Positive for back pain  Negative for arthralgias, gait problem, joint swelling, myalgias, neck pain and neck stiffness  Skin: Negative  Negative for color change, pallor, rash and wound  Allergic/Immunologic: Negative  Negative for environmental allergies, food allergies and immunocompromised state  Neurological: Negative  Negative for dizziness, tremors, seizures, syncope, facial asymmetry, speech difficulty, weakness, light-headedness, numbness and headaches  Hematological: Negative for adenopathy  Bruises/bleeds easily  Psychiatric/Behavioral: Negative  Negative for agitation, behavioral problems, confusion, decreased concentration, dysphoric mood, hallucinations, self-injury, sleep disturbance and suicidal ideas  The patient is not nervous/anxious and is not hyperactive  All other systems reviewed and are negative  Vitals:    12/19/22 1352   BP: 110/62   BP Location: Right arm   Patient Position: Sitting   Cuff Size: Standard   Pulse: 96   Temp: (!) 97 3 °F (36 3 °C)   SpO2: 98%   Weight: 74 8 kg (165 lb)   Height: 5' 1" (1 549 m)     Physical Exam:  Physical Exam  Vitals and nursing note reviewed  Constitutional:       General: She is not in acute distress  Appearance: She is well-developed  She is not diaphoretic  HENT:      Head: Normocephalic and atraumatic  Right Ear: External ear normal       Left Ear: External ear normal    Eyes:      General: No scleral icterus  Right eye: No discharge  Left eye: No discharge  Conjunctiva/sclera: Conjunctivae normal       Pupils: Pupils are equal, round, and reactive to light  Neck:      Thyroid: No thyromegaly  Vascular: No JVD  Trachea: No tracheal deviation  Cardiovascular:      Rate and Rhythm: Normal rate  Rhythm irregular  Heart sounds: Murmur heard  No friction rub  Gallop present  Pulmonary:      Effort: Pulmonary effort is normal  No respiratory distress  Breath sounds: Normal breath sounds  No stridor  No wheezing or rales  Chest:      Chest wall: No tenderness  Abdominal:      General: Bowel sounds are normal  There is no distension  Palpations: Abdomen is soft  There is no mass  Tenderness: There is no abdominal tenderness  There is no guarding or rebound  Musculoskeletal:         General: No tenderness or deformity  Normal range of motion  Cervical back: Normal range of motion and neck supple  Skin:     General: Skin is warm and dry  Coloration: Skin is not pale  Findings: Bruising and lesion present  No erythema or rash  Neurological:      Mental Status: She is alert and oriented to person, place, and time  Cranial Nerves: No cranial nerve deficit  Motor: No abnormal muscle tone  Coordination: Coordination normal       Deep Tendon Reflexes: Reflexes are normal and symmetric  Psychiatric:         Behavior: Behavior normal          Thought Content:  Thought content normal          Judgment: Judgment normal          Labs:     Lab Results   Component Value Date    WBC 5 45 06/24/2022    HGB 12 5 06/24/2022    HCT 39 9 06/24/2022    MCV 91 06/24/2022     06/24/2022     Lab Results   Component Value Date    K 4 2 08/12/2022     08/12/2022    CO2 28 08/12/2022    BUN 38 (H) 08/12/2022    CREATININE 1 28 08/12/2022    GLUCOSE 124 02/11/2020    GLUF 93 08/12/2022    CALCIUM 9 6 08/12/2022    CORRECTEDCA 9 9 06/24/2022    AST 18 06/24/2022    ALT 25 06/24/2022    ALKPHOS 96 06/24/2022 EGFR 39 2022     Lab Results   Component Value Date    CHOL 191 2013    CHOL 218 2013     Lab Results   Component Value Date    HDL 46 (L) 2022    HDL 62 2021    HDL 59 2021     Lab Results   Component Value Date    LDLCALC 45 2022    LDLCALC 47 2021    LDLCALC 37 2021     Lab Results   Component Value Date    TRIG 92 2022    TRIG 73 2021    TRIG 105 2021     Lab Results   Component Value Date    HGBA1C 5 8 (H) 2022       Imaging & Testing   I have personally reviewed pertinent reports  EKG: Personally reviewed  Normal sinus rhythm no acute st/t wave    Cardiac testing:   Results for orders placed during the hospital encounter of 01/15/16   Echo complete with contrast if indicated    Narrative [address]  [address] [facility] [address]  [phone]    Transthoracic Echocardiogram  2D, M-mode, Doppler, and Color Doppler    Study date:  15-Berny-2016    Patient: Grace Gauthier  MR number: [id number]  Account number: [de-identified]  : 1940  Age: 67 years  Gender: Female  Status: Routine  Location: Echo lab  Height: 61 in  Weight: 214 5 lb  BP: 132/ 84 mmHg    Indications: HTN    Diagnoses: 401 9 - HYPERTENSION NOS    Sonographer:  Edgardo Otero  Primary Physician:  Abdon Meeks  Referring Physician:  Missy Glide:  Chauncey Medeiros  Interpreting Physician:  DO AFIA Viveros    LEFT VENTRICLE:  Systolic function was at the lower limits of normal  Ejection fraction was  estimated in the range of 50 % to 55 %  There were no regional wall motion abnormalities  There was moderate concentric hypertrophy  Features were consistent with a pseudonormal left ventricular filling pattern,  with concomitant abnormal relaxation and increased filling pressure (grade 2  diastolic dysfunction)  Doppler parameters were consistent with elevated mean  left atrial filling pressure      LEFT ATRIUM:  The atrium was moderately dilated  RIGHT ATRIUM:  The atrium was markedly dilated  MITRAL VALVE:  There was marked annular calcification  There was moderate regurgitation  TRICUSPID VALVE:  There was mild regurgitation  Pulmonary artery systolic pressure was mildly increased  Estimated peak PA pressure was 46 mmHg  HISTORY: PRIOR HISTORY: Patient has no history of cardiovascular disease  PROCEDURE: The procedure was performed in the echo lab  This was a routine  study  The transthoracic approach was used  The study included complete 2D  imaging, M-mode, complete spectral Doppler, and color Doppler  The heart rate  was 77 bpm, at the start of the study  Echocardiographic views were limited due  to poor acoustic window availability and decreased penetration  This was a  technically difficult study  LEFT VENTRICLE: Size was normal  Systolic function was at the lower limits of  normal  Ejection fraction was estimated in the range of 50 % to 55 %  There  were no regional wall motion abnormalities  There was moderate concentric  hypertrophy  DOPPLER: Features were consistent with a pseudonormal left  ventricular filling pattern, with concomitant abnormal relaxation and increased  filling pressure (grade 2 diastolic dysfunction)  Doppler parameters were  consistent with elevated mean left atrial filling pressure  RIGHT VENTRICLE: The size was normal  Systolic function was normal  DOPPLER:  Systolic pressure was within the normal range  LEFT ATRIUM: The atrium was moderately dilated  No thrombus was identified  RIGHT ATRIUM: The atrium was markedly dilated  MITRAL VALVE: There was marked annular calcification  Valve structure was  normal  There was normal leaflet separation  No echocardiographic evidence for  prolapse  DOPPLER: The transmitral velocity was within the normal range  There  was no evidence for stenosis  There was moderate regurgitation      AORTIC VALVE: The valve was trileaflet  Leaflets exhibited normal thickness,  normal cuspal separation, and sclerosis  DOPPLER: Transaortic velocity was  within the normal range  There was no evidence for stenosis  There was no  regurgitation  TRICUSPID VALVE: The valve structure was normal  There was normal leaflet  separation  DOPPLER: The transtricuspid velocity was within the normal range  There was mild regurgitation  Pulmonary artery systolic pressure was mildly  increased  Estimated peak PA pressure was 46 mmHg  PULMONIC VALVE: Leaflets exhibited normal thickness, no calcification, and  normal cuspal separation  DOPPLER: The transpulmonic velocity was within the  normal range  There was mild regurgitation  PERICARDIUM: There was no thickening  There was no pericardial effusion  AORTA: The root exhibited normal size  PULMONARY ARTERY: The size was normal  The morphology appeared normal     SYSTEM MEASUREMENT TABLES    2D mode  AoR Diam 2D: 2 8 cm  LA Diam (2D): 5 3 cm  LA/Ao (2D): 1 89  FS (2D Teich): 25 3 %  IVSd (2D): 1 44 cm  LVDEV: 98 3 cm³  LVESV: 49 1 cm³  LVIDd(2D): 4 62 cm  LVISd (2D): 3 45 cm  LVPWd (2D): 1 3 cm  SV (Teich): 49 2 cm³    Apical four chamber  LVEF A4C: 55 %    Unspecified Scan Mode  MV Peak A Jesse: 1110 mm/s  MV Peak E Jesse   Mean: 1400 mm/s  MVA (PHT): 3 55 cm squared  PHT: 58 ms  Max P mm[Hg]  V Max: 2990 mm/s  Vmax: 3050 mm/s  RA Area: 19 6 cm squared  RA Volume: 55 3 cm³  TAPSE: 1 9 cm    [facility] Accredited Echocardi[facility] Laboratory    Prepared and electronically signed by    Moses Webster DO  Signed 2016 17:37:47       EKG atrial fibrillation 100 beats per minute  afib 81bmp rbbb nonspecific t-wave changes        Luis Alberto Alaniz  Please call with any questions or suggestions    A description of the counselin minutes spent with family discussing about risk for long-term anticoagulation versus benefits  Goals and Barriers:  Patient's ability to self care:  Medication side effect reviewed with patient in detail and all their questions answered  "This note has been constructed using a voice recognition system  Therefore there may be syntax, spelling, and/or grammatical errors   Please call if you have any questions  "

## 2022-12-21 ENCOUNTER — RA CDI HCC (OUTPATIENT)
Dept: OTHER | Facility: HOSPITAL | Age: 82
End: 2022-12-21

## 2022-12-21 NOTE — PROGRESS NOTES
[facility]  coding opportunities          Chart Reviewed number of suggestions sent to Provider: 1   [id number] [phone]    Patients Insurance     Medicare Insurance: [employer]

## 2022-12-27 ENCOUNTER — LAB (OUTPATIENT)
Dept: LAB | Facility: CLINIC | Age: 82
End: 2022-12-27

## 2022-12-27 DIAGNOSIS — N18.31 STAGE 3A CHRONIC KIDNEY DISEASE (HCC): ICD-10-CM

## 2022-12-27 LAB
ALBUMIN SERPL BCP-MCNC: 3.4 G/DL (ref 3.5–5)
ALP SERPL-CCNC: 96 U/L (ref 46–116)
ALT SERPL W P-5'-P-CCNC: 20 U/L (ref 12–78)
ANION GAP SERPL CALCULATED.3IONS-SCNC: 5 MMOL/L (ref 4–13)
AST SERPL W P-5'-P-CCNC: 13 U/L (ref 5–45)
BACTERIA UR QL AUTO: NORMAL /HPF
BILIRUB SERPL-MCNC: 0.59 MG/DL (ref 0.2–1)
BILIRUB UR QL STRIP: NEGATIVE
BUN SERPL-MCNC: 41 MG/DL (ref 5–25)
CALCIUM ALBUM COR SERPL-MCNC: 10 MG/DL (ref 8.3–10.1)
CALCIUM SERPL-MCNC: 9.5 MG/DL (ref 8.3–10.1)
CHLORIDE SERPL-SCNC: 108 MMOL/L (ref 96–108)
CHOLEST SERPL-MCNC: 121 MG/DL
CLARITY UR: CLEAR
CO2 SERPL-SCNC: 29 MMOL/L (ref 21–32)
COLOR UR: NORMAL
CREAT SERPL-MCNC: 1.11 MG/DL (ref 0.6–1.3)
CREAT UR-MCNC: 85.9 MG/DL
ERYTHROCYTE [DISTWIDTH] IN BLOOD BY AUTOMATED COUNT: 14.8 % (ref 11.6–15.1)
EST. AVERAGE GLUCOSE BLD GHB EST-MCNC: 114 MG/DL
GFR SERPL CREATININE-BSD FRML MDRD: 46 ML/MIN/1.73SQ M
GLUCOSE P FAST SERPL-MCNC: 98 MG/DL (ref 65–99)
GLUCOSE UR STRIP-MCNC: NEGATIVE MG/DL
HBA1C MFR BLD: 5.6 %
HCT VFR BLD AUTO: 38.4 % (ref 34.8–46.1)
HDLC SERPL-MCNC: 54 MG/DL
HGB BLD-MCNC: 11.9 G/DL (ref 11.5–15.4)
HGB UR QL STRIP.AUTO: NEGATIVE
KETONES UR STRIP-MCNC: NEGATIVE MG/DL
LDLC SERPL CALC-MCNC: 48 MG/DL (ref 0–100)
LEUKOCYTE ESTERASE UR QL STRIP: NEGATIVE
MAGNESIUM SERPL-MCNC: 1.8 MG/DL (ref 1.6–2.6)
MCH RBC QN AUTO: 28.7 PG (ref 26.8–34.3)
MCHC RBC AUTO-ENTMCNC: 31 G/DL (ref 31.4–37.4)
MCV RBC AUTO: 93 FL (ref 82–98)
NITRITE UR QL STRIP: NEGATIVE
NON-SQ EPI CELLS URNS QL MICRO: NORMAL /HPF
NONHDLC SERPL-MCNC: 67 MG/DL
PH UR STRIP.AUTO: 5.5 [PH]
PHOSPHATE SERPL-MCNC: 2.8 MG/DL (ref 2.3–4.1)
PLATELET # BLD AUTO: 186 THOUSANDS/UL (ref 149–390)
PMV BLD AUTO: 11.3 FL (ref 8.9–12.7)
POTASSIUM SERPL-SCNC: 3.9 MMOL/L (ref 3.5–5.3)
PROT SERPL-MCNC: 7.1 G/DL (ref 6.4–8.4)
PROT UR STRIP-MCNC: NEGATIVE MG/DL
PROT UR-MCNC: 12 MG/DL
PROT/CREAT UR: 0.14 MG/G{CREAT} (ref 0–0.1)
PTH-INTACT SERPL-MCNC: 136.3 PG/ML (ref 18.4–80.1)
RBC # BLD AUTO: 4.14 MILLION/UL (ref 3.81–5.12)
RBC #/AREA URNS AUTO: NORMAL /HPF
SODIUM SERPL-SCNC: 142 MMOL/L (ref 135–147)
SP GR UR STRIP.AUTO: 1.02 (ref 1–1.03)
TRANS CELLS #/AREA URNS HPF: PRESENT /[HPF]
TRIGL SERPL-MCNC: 96 MG/DL
TSH SERPL DL<=0.05 MIU/L-ACNC: 2.6 UIU/ML (ref 0.45–4.5)
UROBILINOGEN UR STRIP-ACNC: <2 MG/DL
WBC # BLD AUTO: 6.48 THOUSAND/UL (ref 4.31–10.16)
WBC #/AREA URNS AUTO: NORMAL /HPF

## 2022-12-29 ENCOUNTER — OFFICE VISIT (OUTPATIENT)
Dept: FAMILY MEDICINE CLINIC | Facility: CLINIC | Age: 82
End: 2022-12-29

## 2022-12-29 VITALS
DIASTOLIC BLOOD PRESSURE: 74 MMHG | BODY MASS INDEX: 31.21 KG/M2 | WEIGHT: 165.2 LBS | OXYGEN SATURATION: 98 % | HEART RATE: 89 BPM | SYSTOLIC BLOOD PRESSURE: 116 MMHG | RESPIRATION RATE: 16 BRPM

## 2022-12-29 DIAGNOSIS — E03.9 HYPOTHYROIDISM, UNSPECIFIED TYPE: Chronic | ICD-10-CM

## 2022-12-29 DIAGNOSIS — E78.5 HYPERLIPIDEMIA, UNSPECIFIED HYPERLIPIDEMIA TYPE: ICD-10-CM

## 2022-12-29 RX ORDER — ATORVASTATIN CALCIUM 40 MG/1
40 TABLET, FILM COATED ORAL EVERY EVENING
Qty: 90 TABLET | Refills: 1 | Status: SHIPPED | OUTPATIENT
Start: 2022-12-29

## 2022-12-29 RX ORDER — LEVOTHYROXINE SODIUM 88 UG/1
88 TABLET ORAL DAILY
Qty: 90 TABLET | Refills: 1 | Status: SHIPPED | OUTPATIENT
Start: 2022-12-29

## 2022-12-29 NOTE — PROGRESS NOTES
Subjective:      Patient ID: Antonietta Lopez is a 79 y o  female  Hyperlipidemia  This is a chronic problem  The current episode started more than 1 year ago  The problem is controlled  Recent lipid tests were reviewed and are normal  Pertinent negatives include no chest pain or myalgias  Current antihyperlipidemic treatment includes statins  The current treatment provides significant improvement of lipids  There are no compliance problems  There are no known risk factors for coronary artery disease  Hypertension  This is a chronic problem  The current episode started more than 1 year ago  The problem is controlled  Pertinent negatives include no chest pain or palpitations  Past treatments include calcium channel blockers, angiotensin blockers and beta blockers  The current treatment provides significant improvement  Identifiable causes of hypertension include a thyroid problem  Thyroid Problem  Presents for follow-up visit  Patient reports no depressed mood or palpitations  The symptoms have been stable (Levothyroxine 88 mcg)  Her past medical history is significant for hyperlipidemia         Past Medical History:   Diagnosis Date   • Anxiety    • Arthritis     joints   • Cataract    • Disease of thyroid gland    • Eczema    • GERD (gastroesophageal reflux disease)    • Gout    • Heart failure (HCC)    • Hepatitis    • Hiatal hernia    • Hypertension    • Onychomycosis     last assessed: 16   • Orthopnea     resolved: 16   • Pseudogout of left wrist    • Sleep apnea     wears CPAP   • Stroke [facility])        Family History   Problem Relation Age of Onset   • Diabetes Mother    • Coronary artery disease Mother    • Arthritis Mother    • Hypertension Mother    • Hypertension Father    • Diabetes Brother    • Diabetes Son    • Arthritis Family        Past Surgical History:   Procedure Laterality Date   • ABDOMINAL SURGERY         • BRAIN HEMATOMA EVACUATION Right 2020    Procedure: Right decompressive craniectomy and evacuation of intraparenchymal hematoma; Surgeon: Harman Davies MD;  Location: [facility] MAIN OR;  Service: Neurosurgery   • BREAST SURGERY Right     cyst removal   • CARPAL TUNNEL RELEASE Left    • CARPAL TUNNEL RELEASE Right    • CATARACT EXTRACTION     •  SECTION  1960    x1   • COLONOSCOPY N/A 2018    Procedure: COLONOSCOPY;  Surgeon: Maday Smith MD;  Location: [facility] GI LAB; Service: Gastroenterology   • DILATION AND CURETTAGE OF UTERUS     • EYE SURGERY Left     cataracts   • HERNIA REPAIR      umbilical hernia   • INCISIONAL HERNIA REPAIR      incarcerated   • KNEE ARTHROSCOPY Right    • DE RPLCMT BONE FLAP/PROSTHETIC PLATE SKULL Right 4082    Procedure: right frontotemporal replacement cranioplasty;  Surgeon: Harman Davies MD;  Location: [facility];  Service: Neurosurgery   • DE XCAPSL CTRC RMVL INSJ IO LENS PROSTH W/O ECP Right 3/27/2017    Procedure: EXTRACTION EXTRACAPSULAR CATARACT PHACO INTRAOCULAR LENS (IOL); Surgeon: Niranjan Hoang MD;  Location: [facility] MAIN OR;  Service: Ophthalmology        reports that she has never smoked  She has never used smokeless tobacco  She reports current alcohol use  She reports that she does not use drugs        Current Outpatient Medications:   •  amLODIPine (NORVASC) 10 mg tablet, Take 0 5 tablets (5 mg total) by mouth daily, Disp: 90 tablet, Rfl: 0  •  apixaban (Eliquis) 2 5 mg, Take 1 tablet (2 5 mg total) by mouth 2 (two) times a day, Disp: 180 tablet, Rfl: 3  •  atorvastatin (LIPITOR) 40 mg tablet, Take 1 tablet (40 mg total) by mouth every evening, Disp: 90 tablet, Rfl: 1  •  bumetanide (BUMEX) 2 mg tablet, Take 1 tablet (2 mg total) by mouth daily, Disp: 90 tablet, Rfl: 3  •  levothyroxine 88 mcg tablet, Take 1 tablet (88 mcg total) by mouth daily, Disp: 90 tablet, Rfl: 1  •  losartan (COZAAR) 25 mg tablet, TAKE 2 TABLETS IN THE MORNING AND TAKE 1 TABLET IN THE EVENING, Disp: 270 tablet, Rfl: 3  • metoprolol tartrate (LOPRESSOR) 25 mg tablet, Take 1 tablet (25 mg total) by mouth every 12 (twelve) hours (Patient taking differently: Take 12 5 mg by mouth every 12 (twelve) hours), Disp: 180 tablet, Rfl: 0  •  pantoprazole (PROTONIX) 20 mg tablet, Take 1 tablet (20 mg total) by mouth daily, Disp: 90 tablet, Rfl: 1  •  spironolactone (ALDACTONE) 25 mg tablet, Take 0 5 tablets (12 5 mg total) by mouth daily, Disp: 45 tablet, Rfl: 3    The following portions of the patient's history were reviewed and updated as appropriate: allergies, current medications, past family history, past medical history, past social history, past surgical history and problem list     Review of Systems   Constitutional: Negative  Respiratory: Negative  Cardiovascular: Negative for chest pain and palpitations  Musculoskeletal: Negative for myalgias  Objective:    /74 (BP Location: Left arm, Patient Position: Sitting, Cuff Size: Standard)   Pulse 89   Resp 16   Wt 74 9 kg (165 lb 3 2 oz)   SpO2 98%   BMI 31 21 kg/m²      Physical Exam  Constitutional:       Appearance: Normal appearance  Cardiovascular:      Heart sounds: Normal heart sounds  Pulmonary:      Breath sounds: Normal breath sounds     Psychiatric:         Mood and Affect: Mood normal            Recent Results (from the past 1008 hour(s))   Comprehensive metabolic panel    Collection Time: 12/27/22  7:14 AM   Result Value Ref Range    Sodium 142 135 - 147 mmol/L    Potassium 3 9 3 5 - 5 3 mmol/L    Chloride 108 96 - 108 mmol/L    CO2 29 21 - 32 mmol/L    ANION GAP 5 4 - 13 mmol/L    BUN 41 (H) 5 - 25 mg/dL    Creatinine 1 11 0 60 - 1 30 mg/dL    Glucose, Fasting 98 65 - 99 mg/dL    Calcium 9 5 8 3 - 10 1 mg/dL    Corrected Calcium 10 0 8 3 - 10 1 mg/dL    AST 13 5 - 45 U/L    ALT 20 12 - 78 U/L    Alkaline Phosphatase 96 46 - 116 U/L    Total Protein 7 1 6 4 - 8 4 g/dL    Albumin 3 4 (L) 3 5 - 5 0 g/dL    Total Bilirubin 0 59 0 20 - 1 00 mg/dL eGFR 46 ml/min/1 73sq m   Hemoglobin A1C    Collection Time: 12/27/22  7:14 AM   Result Value Ref Range    Hemoglobin A1C 5 6 Normal 3 8-5 6%; PreDiabetic 5 7-6 4%;  Diabetic >=6 5%; Glycemic control for adults with diabetes <7 0% %     mg/dl   Lipid panel    Collection Time: 12/27/22  7:14 AM   Result Value Ref Range    Cholesterol 121 See Comment mg/dL    Triglycerides 96 See Comment mg/dL    HDL, Direct 54 >=50 mg/dL    LDL Calculated 48 0 - 100 mg/dL    Non-HDL-Chol (CHOL-HDL) 67 mg/dl   TSH, 3rd generation with Free T4 reflex    Collection Time: 12/27/22  7:14 AM   Result Value Ref Range    TSH 3RD GENERATON 2 600 0 450 - 4 500 uIU/mL   CBC    Collection Time: 12/27/22  7:14 AM   Result Value Ref Range    WBC 6 48 4 31 - 10 16 Thousand/uL    RBC 4 14 3 81 - 5 12 Million/uL    Hemoglobin 11 9 11 5 - 15 4 g/dL    Hematocrit 38 4 34 8 - 46 1 %    MCV 93 82 - 98 fL    MCH 28 7 26 8 - 34 3 pg    MCHC 31 0 (L) 31 4 - 37 4 g/dL    RDW 14 8 11 6 - 15 1 %    Platelets 993 987 - 197 Thousands/uL    MPV 11 3 8 9 - 12 7 fL   Magnesium    Collection Time: 12/27/22  7:14 AM   Result Value Ref Range    Magnesium 1 8 1 6 - 2 6 mg/dL   Phosphorus    Collection Time: 12/27/22  7:14 AM   Result Value Ref Range    Phosphorus 2 8 2 3 - 4 1 mg/dL   Protein / creatinine ratio, urine    Collection Time: 12/27/22  7:14 AM   Result Value Ref Range    Creatinine, Ur 85 9 mg/dL    Protein Urine Random 12 mg/dL    Prot/Creat Ratio, Ur 0 14 (H) 0 00 - 0 10   PTH, intact    Collection Time: 12/27/22  7:14 AM   Result Value Ref Range     3 (H) 18 4 - 80 1 pg/mL   Urinalysis with microscopic    Collection Time: 12/27/22  7:14 AM   Result Value Ref Range    Color, UA Light Yellow     Clarity, UA Clear     Specific Gravity, UA 1 021 1 003 - 1 030    pH, UA 5 5 4 5, 5 0, 5 5, 6 0, 6 5, 7 0, 7 5, 8 0    Leukocytes, UA Negative Negative    Nitrite, UA Negative Negative    Protein, UA Negative Negative mg/dl    Glucose, UA Negative Negative mg/dl    Ketones, UA Negative Negative mg/dl    Urobilinogen, UA <2 0 <2 0 mg/dl mg/dl    Bilirubin, UA Negative Negative    Occult Blood, UA Negative Negative    RBC, UA None Seen None Seen, 1-2 /hpf    WBC, UA 1-2 None Seen, 1-2 /hpf    Epithelial Cells None Seen None Seen, Occasional /hpf    Bacteria, UA None Seen None Seen, Occasional /hpf    Transitional Epithelial Cells Present        Assessment/Plan:    No problem-specific Assessment & Plan notes found for this encounter  Problem List Items Addressed This Visit        Endocrine    Hypothyroidism (Chronic)     TSH stable  Continue levothyroxine 88 mcg          Relevant Medications    levothyroxine 88 mcg tablet    Other Relevant Orders    TSH, 3rd generation with Free T4 reflex       Other    Hyperlipemia     LDL is at goal   Continue atorvastatin 40 mg  Metabolic labs/follow-up 6 month interval         Relevant Medications    atorvastatin (LIPITOR) 40 mg tablet    Other Relevant Orders    Comprehensive metabolic panel    Lipid panel     Continue monitoring blood pressure per cardiology

## 2023-01-09 DIAGNOSIS — I49.1 PAC (PREMATURE ATRIAL CONTRACTION): ICD-10-CM

## 2023-01-09 DIAGNOSIS — I48.0 PAROXYSMAL ATRIAL FIBRILLATION (HCC): ICD-10-CM

## 2023-01-09 RX ORDER — PANTOPRAZOLE SODIUM 20 MG/1
20 TABLET, DELAYED RELEASE ORAL DAILY
Qty: 90 TABLET | Refills: 0 | Status: SHIPPED | OUTPATIENT
Start: 2023-01-09

## 2023-01-16 ENCOUNTER — OFFICE VISIT (OUTPATIENT)
Dept: NEPHROLOGY | Facility: CLINIC | Age: 83
End: 2023-01-16

## 2023-01-16 VITALS
BODY MASS INDEX: 30.58 KG/M2 | HEART RATE: 85 BPM | WEIGHT: 162 LBS | DIASTOLIC BLOOD PRESSURE: 66 MMHG | SYSTOLIC BLOOD PRESSURE: 128 MMHG | HEIGHT: 61 IN

## 2023-01-16 DIAGNOSIS — I34.0 MODERATE TO SEVERE MITRAL REGURGITATION: ICD-10-CM

## 2023-01-16 DIAGNOSIS — I10 BENIGN ESSENTIAL HTN: ICD-10-CM

## 2023-01-16 DIAGNOSIS — I48.91 ATRIAL FIBRILLATION BY ELECTROCARDIOGRAM (HCC): ICD-10-CM

## 2023-01-16 DIAGNOSIS — I50.32 CHRONIC DIASTOLIC (CONGESTIVE) HEART FAILURE (HCC): ICD-10-CM

## 2023-01-16 DIAGNOSIS — N18.31 STAGE 3A CHRONIC KIDNEY DISEASE (HCC): Primary | ICD-10-CM

## 2023-01-16 NOTE — PROGRESS NOTES
NEPHROLOGY OFFICE VISIT   Manuel Choe 79 y o  female MRN: [id number]  1/16/2023    Reason for Visit: CKD III    ASSESSMENT and PLAN:    I had the pleasure of seeing Ms Russell Bee today in the renal clinic for the continued management of CKD III         2/2020 - February with 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 on February 11th   Completed course of Keppra for seizure prophylaxis      3/30/2020 -Berger Cluck was increased to 50 mg twice a day from 50 in the morning and 25 in the evening due to high blood pressures     4/3/2020 - spironolactone 12 5 mg daily was started due to continued high blood pressure     4/2020 - patient had presented for replacement current of cranioplasty on April 6   And subsequently was admitted to the rehab center postoperatively      March 2021- patient was admitted to hospital with not feeling well and   Headache   CT scan of the head with no acute intracranial pathology  The other symptoms of memory loss were also present   Patient was referred to geriatrician      April 2021 - sciatic pain  Received brief steroid course     5/2021 - back pain  Went to ER  Given pred taper       11-year-old female with a past medical history of chronic kidney disease, venous stasis, HTN, hypothyroidism, lumbar canal stenosis, Anxiety, GERD, neuropathy, Gout, EF 87-44%, Grade 2 diastolic dysfunction who presents for follow up for CKD  To note, patient's  has now passed away in August 1) CKD III - patient also has a long standing history of HTN  Creatinine in 2013, 0 9 mg/dL  Cr early 2016 was 0 80-0 9 mg/dL; then increased starting in august 2016 to 2 3 mg/dL and has fluctuated since  CT scan in 2016, kidneys appearing normal at that time       Etiology of CKD may be long standing HTN and ATN   Baseline Cr ~ 1 1 -1 3 mg/dL     Most recent creatinine is at baseline 12/27 at 1 1 mg/dL with stable and appropriate electrolytes      - Urine eos 0%;   - A1c controlled prior  at 5 7% on December 8th in 2020  -U PCR stable  0 15 stable --> 0 18 gm/gm in 6/2020 --> 0 1 December 8th --> 0 12 gm/gm (3/22/2021) --> too low to quantify (6/2021) --> 0 1 6/3/2022 --> 0 14 g/g December 2022  - Urinalysis bland December 2022  -SPEP unrevealing  -UPEP unrevealing  - C3, C4 unrevealing  - Renal u/s with R 10 3 cm, L 10 4 cm, renal cortex 1 cm b/l; both kidneys slightly reduced in size; lower pole of R kidney is non obstructing calculus  - Pt follows with Urology team for nephrolithiasis  - No NSAIDs, the patient is not currently taking NSAID  -nuc stress test per Cardiology in Jan 2019 unrevealing  - repeat renal u/s 1/2019 - unrevealing with exception of renal cortical atrophy; but also 6 mm non shadowing calculus      Plan:     - no changes today  - labs in 4-5 months  - appt in 5 months   -cont metoprolol 25 mg twice daily  - cont amlodipine 5 mg daily, spironolactone half tab daily  - inc eliquis to 2 5 mg twice daily (pt was only taking once daily)  - I have messaged pt's cardiologist with above information as there was some confusion from pt about eliquis dosing     2) SOB - Volume - follows with Cardiology     - on Bumetanide     3) HTN -      - cont losartan, bumex and amlodipine, spironolactone  -  due to bradycardia, beta-blocker was held prior but given new onset atrial fibrillation around May 2022, patient was restarted on metoprolol per Cardiology team  - no changes today   See above       4) hypothyroid - as per Primary Care Physician     5) HPL - Primary Care following       6) Electrolytes - stable     7) MBD -     - Vit D 50 7 in nov 2019 --> 40 8 March 2022  - PTH improved 89 in nov 2019 --> 78 6 in June 2020 --> 88 3/8/2022 --> 136 December 2022     8) gout -      -monitor for flare     9) back pain - follows with Pain management     - pain sig improved with inj     10) Colonoscopy in feb with internal hemorrhoids and polyp removed       11) alk phos elevation     -improved     12) Anemia - Hb stable     -hemoglobin stable     13) Mitral valve calcification     - moderate to severe mitral regurgitation  -eventually may require further evaluation  Being monitored for now conservatively  - per Cardiology team     14) elevated D-dimer prior-patient was given duplex of lower extremities which was unrevealing prior     15) knee pain after fall in august 2020     - saw Orthopedic team  - steroid injection was given     16) intracranial hemorrhage with mass effect requiring craniotomy on 2/11/2020     - now is status post replacement of cranioplasty in April 2020     17)  Anxiety- patient was started on Cymbalta  PCP is attempting off of gabapentin     - there were periods of not taking cymbalta     18)   Zoster infection in November 2020-treated with Valtrex per primary team     19) a fib - new onset in May 2022     -was advised to have Holter monitor  There is concern for tachy-hola syndrome initially and Holter monitor showed periods of atrial fibrillation with RVR and nonsustained ventricular tachycardia  Was started on low-dose beta-blocker with metoprolol   -in July metoprolol was titrated further to 25 mg twice a day  -was started on Eliquis 2 5 mg twice a day  Lower dose was chosen due to high risk factors  Patient opted to remain only on Plavix and aspirin      It was a pleasure evaluating your patient  Thank you for allowing our team to participate in the care of Ms Antonietta Lopez  Please do not hesitate to contact our team if further issues/questions shall arise in the interim       No problem-specific Assessment & Plan notes found for this encounter  HPI:    Patient denies complaints  Daughter is concerned that the patient is not eating well at times  Patient believes otherwise      PATIENT INSTRUCTIONS:    Patient Instructions   1) Avoid NSAIDS - (Example - motrin, advil, ibuprofen, aleve, exederin, etc)  2) Always follow a low salt diet  3) labwork in 5 months and appt in 5 months  4) eliquis should be twice daily  5) continue rest of medications as you are doing          OBJECTIVE:  Current Weight: Weight - Scale: 73 5 kg (162 lb)  Vitals:    01/16/23 1125   BP: 128/66   BP Location: Left arm   Patient Position: Sitting   Cuff Size: Standard   Pulse: 85   Weight: 73 5 kg (162 lb)   Height: 5' 1" (1 549 m)    Body mass index is 30 61 kg/m²  REVIEW OF SYSTEMS:    Review of Systems   Constitutional: Negative  Negative for fatigue  HENT: Negative  Eyes: Negative  Respiratory: Negative  Negative for shortness of breath  Cardiovascular: Negative  Negative for leg swelling  Gastrointestinal: Negative  Endocrine: Negative  Genitourinary: Negative  Negative for difficulty urinating  Musculoskeletal: Negative  Skin: Negative  Allergic/Immunologic: Negative  Neurological: Negative  Hematological: Negative  Psychiatric/Behavioral: Negative  All other systems reviewed and are negative  PHYSICAL EXAM:      Physical Exam  Vitals and nursing note reviewed  Constitutional:       General: She is not in acute distress  Appearance: She is well-developed  She is not diaphoretic  HENT:      Head: Normocephalic and atraumatic  Eyes:      General: No scleral icterus  Right eye: No discharge  Left eye: No discharge  Conjunctiva/sclera: Conjunctivae normal    Neck:      Vascular: No JVD  Cardiovascular:      Rate and Rhythm: Normal rate and regular rhythm  Heart sounds: No murmur heard  No friction rub  No gallop  Pulmonary:      Effort: Pulmonary effort is normal  No respiratory distress  Breath sounds: Normal breath sounds  No wheezing or rales  Abdominal:      General: Bowel sounds are normal  There is no distension  Palpations: Abdomen is soft  Tenderness: There is no abdominal tenderness  There is no rebound     Musculoskeletal:         General: No tenderness or deformity  Normal range of motion  Cervical back: Normal range of motion and neck supple  Skin:     General: Skin is warm and dry  Coloration: Skin is not pale  Findings: No erythema or rash  Neurological:      Mental Status: She is alert and oriented to person, place, and time  Coordination: Coordination normal    Psychiatric:         Behavior: Behavior normal          Thought Content:  Thought content normal          Judgment: Judgment normal          Medications:    Current Outpatient Medications:   •  amLODIPine (NORVASC) 10 mg tablet, Take 0 5 tablets (5 mg total) by mouth daily, Disp: 90 tablet, Rfl: 0  •  apixaban (Eliquis) 2 5 mg, Take 1 tablet (2 5 mg total) by mouth 2 (two) times a day, Disp: 180 tablet, Rfl: 3  •  atorvastatin (LIPITOR) 40 mg tablet, Take 1 tablet (40 mg total) by mouth every evening, Disp: 90 tablet, Rfl: 1  •  bumetanide (BUMEX) 2 mg tablet, Take 1 tablet (2 mg total) by mouth daily, Disp: 90 tablet, Rfl: 3  •  levothyroxine 88 mcg tablet, Take 1 tablet (88 mcg total) by mouth daily, Disp: 90 tablet, Rfl: 1  •  losartan (COZAAR) 25 mg tablet, TAKE 2 TABLETS IN THE MORNING AND TAKE 1 TABLET IN THE EVENING, Disp: 270 tablet, Rfl: 3  •  metoprolol tartrate (LOPRESSOR) 25 mg tablet, Take 1 tablet (25 mg total) by mouth every 12 (twelve) hours, Disp: 180 tablet, Rfl: 0  •  pantoprazole (PROTONIX) 20 mg tablet, Take 1 tablet (20 mg total) by mouth daily, Disp: 90 tablet, Rfl: 0  •  spironolactone (ALDACTONE) 25 mg tablet, Take 0 5 tablets (12 5 mg total) by mouth daily, Disp: 45 tablet, Rfl: 3    Laboratory Results:        Invalid input(s): ALBUMIN    Results for orders placed or performed in visit on 12/27/22   CBC   Result Value Ref Range    WBC 6 48 4 31 - 10 16 Thousand/uL    RBC 4 14 3 81 - 5 12 Million/uL    Hemoglobin 11 9 11 5 - 15 4 g/dL    Hematocrit 38 4 34 8 - 46 1 %    MCV 93 82 - 98 fL    MCH 28 7 26 8 - 34 3 pg    MCHC 31 0 (L) 31 4 - 37 4 g/dL    RDW 14 8 11 6 - 15 1 %    Platelets 587 854 - 010 Thousands/uL    MPV 11 3 8 9 - 12 7 fL   Magnesium   Result Value Ref Range    Magnesium 1 8 1 6 - 2 6 mg/dL   Phosphorus   Result Value Ref Range    Phosphorus 2 8 2 3 - 4 1 mg/dL   Protein / creatinine ratio, urine   Result Value Ref Range    Creatinine, Ur 85 9 mg/dL    Protein Urine Random 12 mg/dL    Prot/Creat Ratio, Ur 0 14 (H) 0 00 - 0 10   PTH, intact   Result Value Ref Range     3 (H) 18 4 - 80 1 pg/mL   Urinalysis with microscopic   Result Value Ref Range    Color, UA Light Yellow     Clarity, UA Clear     Specific Gravity, UA 1 021 1 003 - 1 030    pH, UA 5 5 4 5, 5 0, 5 5, 6 0, 6 5, 7 0, 7 5, 8 0    Leukocytes, UA Negative Negative    Nitrite, UA Negative Negative    Protein, UA Negative Negative mg/dl    Glucose, UA Negative Negative mg/dl    Ketones, UA Negative Negative mg/dl    Urobilinogen, UA <2 0 <2 0 mg/dl mg/dl    Bilirubin, UA Negative Negative    Occult Blood, UA Negative Negative    RBC, UA None Seen None Seen, 1-2 /hpf    WBC, UA 1-2 None Seen, 1-2 /hpf    Epithelial Cells None Seen None Seen, Occasional /hpf    Bacteria, UA None Seen None Seen, Occasional /hpf    Transitional Epithelial Cells Present

## 2023-01-16 NOTE — LETTER
January 16, 2023     Emily Betancourt MD  [address]  [place] [id number]    Patient: Aidan Uriarte   YOB: 1940   Date of Visit: 1/16/2023       Dear Dr Zuñiga Areas:    Thank you for referring Yoel Sood to me for evaluation  Below are my notes for this consultation  If you have questions, please do not hesitate to call me  I look forward to following your patient along with you  Sincerely,        Anastacio Lanier MD        CC: No Recipients  Anastacio Lanier MD  1/16/2023 12:09 PM  Sign when Signing Visit  [address] [facility] 79 y o  female MRN: [id number]  1/16/2023    Reason for Visit: CKD III    ASSESSMENT and PLAN:    I had the pleasure of seeing Ms Judy Wyatt today in the renal clinic for the continued management of CKD III         2/2020 - February with 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 on February 11th   Completed course of Keppra for seizure prophylaxis      3/30/2020 -Effie Shutters was increased to 50 mg twice a day from 50 in the morning and 25 in the evening due to high blood pressures     4/3/2020 - spironolactone 12 5 mg daily was started due to continued high blood pressure     4/2020 - patient had presented for replacement current of cranioplasty on April 6   And subsequently was admitted to the rehab center postoperatively      March 2021- patient was admitted to hospital with not feeling well and   Headache   CT scan of the head with no acute intracranial pathology  The other symptoms of memory loss were also present   Patient was referred to geriatrician      April 2021 - sciatic pain  Received brief steroid course     5/2021 - back pain  Went to ER   Given pred taper       48-year-old female with a past medical history of chronic kidney disease, venous stasis, HTN, hypothyroidism, lumbar canal stenosis, Anxiety, GERD, neuropathy, Gout, EF 20-22%, Grade 2 diastolic dysfunction who presents for follow up for CKD  To note, patient's  has now passed away in August      1) CKD III - patient also has a long standing history of HTN  Creatinine in 2013, 0 9 mg/dL  Cr early 2016 was 0 80-0 9 mg/dL; then increased starting in august 2016 to 2 3 mg/dL and has fluctuated since  CT scan in 2016, kidneys appearing normal at that time       Etiology of CKD may be long standing HTN and ATN  Baseline Cr ~ 1 1 -1 3 mg/dL     Most recent creatinine is at baseline 12/27 at 1 1 mg/dL with stable and appropriate electrolytes      - Urine eos 0%;   - A1c controlled prior  at 5 7% on December 8th in 2020  -U PCR stable  0 15 stable --> 0 18 gm/gm in 6/2020 --> 0 1 December 8th --> 0 12 gm/gm (3/22/2021) --> too low to quantify (6/2021) --> 0 1 6/3/2022 --> 0 14 g/g December 2022  - Urinalysis bland December 2022  -SPEP unrevealing  -UPEP unrevealing  - C3, C4 unrevealing  - Renal u/s with R 10 3 cm, L 10 4 cm, renal cortex 1 cm b/l; both kidneys slightly reduced in size; lower pole of R kidney is non obstructing calculus  - Pt follows with Urology team for nephrolithiasis     - No NSAIDs, the patient is not currently taking NSAID  -nuc stress test per Cardiology in Jan 2019 unrevealing  - repeat renal u/s 1/2019 - unrevealing with exception of renal cortical atrophy; but also 6 mm non shadowing calculus      Plan:     - no changes today  - labs in 4-5 months  - appt in 5 months   -cont metoprolol 25 mg twice daily  - cont amlodipine 5 mg daily, spironolactone half tab daily  - inc eliquis to 2 5 mg twice daily (pt was only taking once daily)  - I have messaged pt's cardiologist with above information as there was some confusion from pt about eliquis dosing     2) SOB - Volume - follows with Cardiology     - on Bumetanide     3) HTN -      - cont losartan, bumex and amlodipine, spironolactone  -  due to bradycardia, beta-blocker was held prior but given new onset atrial fibrillation around May 2022, patient was restarted on metoprolol per Cardiology team  - no changes today  See above       4) hypothyroid - as per Primary Care Physician     5) HPL - Primary Care following       6) Electrolytes - stable     7) MBD -     - Vit D 50 7 in nov 2019 --> 40 8 March 2022  - PTH improved 89 in nov 2019 --> 78 6 in June 2020 --> 88 3/8/2022 --> 136 December 2022     8) gout -      -monitor for flare     9) back pain - follows with Pain management     - pain sig improved with inj     10) Colonoscopy in feb with internal hemorrhoids and polyp removed       11) alk phos elevation     -improved     12) Anemia - Hb stable     -hemoglobin stable     13) Mitral valve calcification     - moderate to severe mitral regurgitation  -eventually may require further evaluation  Being monitored for now conservatively  - per Cardiology team     14) elevated D-dimer prior-patient was given duplex of lower extremities which was unrevealing prior     15) knee pain after fall in august 2020     - saw Orthopedic team  - steroid injection was given     16) intracranial hemorrhage with mass effect requiring craniotomy on 2/11/2020     - now is status post replacement of cranioplasty in April 2020     17)  Anxiety- patient was started on Cymbalta  PCP is attempting off of gabapentin     - there were periods of not taking cymbalta     18)   Zoster infection in November 2020-treated with Valtrex per primary team     19) a fib - new onset in May 2022     -was advised to have Holter monitor  There is concern for tachy-hola syndrome initially and Holter monitor showed periods of atrial fibrillation with RVR and nonsustained ventricular tachycardia  Was started on low-dose beta-blocker with metoprolol   -in July metoprolol was titrated further to 25 mg twice a day  -was started on Eliquis 2 5 mg twice a day  Lower dose was chosen due to high risk factors    Patient opted to remain only on Plavix and aspirin      It was a pleasure evaluating your patient  Thank you for allowing our team to participate in the care of Ms Von Ryan  Please do not hesitate to contact our team if further issues/questions shall arise in the interim       No problem-specific Assessment & Plan notes found for this encounter  HPI:    Patient denies complaints  Daughter is concerned that the patient is not eating well at times  Patient believes otherwise  PATIENT INSTRUCTIONS:    Patient Instructions   1) Avoid NSAIDS - (Example - motrin, advil, ibuprofen, aleve, exederin, etc)  2) Always follow a low salt diet  3) labwork in 5 months and appt in 5 months  4) eliquis should be twice daily  5) continue rest of medications as you are doing          OBJECTIVE:  Current Weight: Weight - Scale: 73 5 kg (162 lb)  Vitals:    01/16/23 1125   BP: 128/66   BP Location: Left arm   Patient Position: Sitting   Cuff Size: Standard   Pulse: 85   Weight: 73 5 kg (162 lb)   Height: 5' 1" (1 549 m)    Body mass index is 30 61 kg/m²  REVIEW OF SYSTEMS:    Review of Systems   Constitutional: Negative  Negative for fatigue  HENT: Negative  Eyes: Negative  Respiratory: Negative  Negative for shortness of breath  Cardiovascular: Negative  Negative for leg swelling  Gastrointestinal: Negative  Endocrine: Negative  Genitourinary: Negative  Negative for difficulty urinating  Musculoskeletal: Negative  Skin: Negative  Allergic/Immunologic: Negative  Neurological: Negative  Hematological: Negative  Psychiatric/Behavioral: Negative  All other systems reviewed and are negative  PHYSICAL EXAM:      Physical Exam  Vitals and nursing note reviewed  Constitutional:       General: She is not in acute distress  Appearance: She is well-developed  She is not diaphoretic  HENT:      Head: Normocephalic and atraumatic  Eyes:      General: No scleral icterus  Right eye: No discharge  Left eye: No discharge        Conjunctiva/sclera: Conjunctivae normal  Neck:      Vascular: No JVD  Cardiovascular:      Rate and Rhythm: Normal rate and regular rhythm  Heart sounds: No murmur heard  No friction rub  No gallop  Pulmonary:      Effort: Pulmonary effort is normal  No respiratory distress  Breath sounds: Normal breath sounds  No wheezing or rales  Abdominal:      General: Bowel sounds are normal  There is no distension  Palpations: Abdomen is soft  Tenderness: There is no abdominal tenderness  There is no rebound  Musculoskeletal:         General: No tenderness or deformity  Normal range of motion  Cervical back: Normal range of motion and neck supple  Skin:     General: Skin is warm and dry  Coloration: Skin is not pale  Findings: No erythema or rash  Neurological:      Mental Status: She is alert and oriented to person, place, and time  Coordination: Coordination normal    Psychiatric:         Behavior: Behavior normal          Thought Content:  Thought content normal          Judgment: Judgment normal          Medications:    Current Outpatient Medications:   •  amLODIPine (NORVASC) 10 mg tablet, Take 0 5 tablets (5 mg total) by mouth daily, Disp: 90 tablet, Rfl: 0  •  apixaban (Eliquis) 2 5 mg, Take 1 tablet (2 5 mg total) by mouth 2 (two) times a day, Disp: 180 tablet, Rfl: 3  •  atorvastatin (LIPITOR) 40 mg tablet, Take 1 tablet (40 mg total) by mouth every evening, Disp: 90 tablet, Rfl: 1  •  bumetanide (BUMEX) 2 mg tablet, Take 1 tablet (2 mg total) by mouth daily, Disp: 90 tablet, Rfl: 3  •  levothyroxine 88 mcg tablet, Take 1 tablet (88 mcg total) by mouth daily, Disp: 90 tablet, Rfl: 1  •  losartan (COZAAR) 25 mg tablet, TAKE 2 TABLETS IN THE MORNING AND TAKE 1 TABLET IN THE EVENING, Disp: 270 tablet, Rfl: 3  •  metoprolol tartrate (LOPRESSOR) 25 mg tablet, Take 1 tablet (25 mg total) by mouth every 12 (twelve) hours, Disp: 180 tablet, Rfl: 0  •  pantoprazole (PROTONIX) 20 mg tablet, Take 1 tablet (20 mg total) by mouth daily, Disp: 90 tablet, Rfl: 0  •  spironolactone (ALDACTONE) 25 mg tablet, Take 0 5 tablets (12 5 mg total) by mouth daily, Disp: 45 tablet, Rfl: 3    Laboratory Results:        Invalid input(s): ALBUMIN    Results for orders placed or performed in visit on 12/27/22   CBC   Result Value Ref Range    WBC 6 48 4 31 - 10 16 Thousand/uL    RBC 4 14 3 81 - 5 12 Million/uL    Hemoglobin 11 9 11 5 - 15 4 g/dL    Hematocrit 38 4 34 8 - 46 1 %    MCV 93 82 - 98 fL    MCH 28 7 26 8 - 34 3 pg    MCHC 31 0 (L) 31 4 - 37 4 g/dL    RDW 14 8 11 6 - 15 1 %    Platelets 352 515 - 536 Thousands/uL    MPV 11 3 8 9 - 12 7 fL   Magnesium   Result Value Ref Range    Magnesium 1 8 1 6 - 2 6 mg/dL   Phosphorus   Result Value Ref Range    Phosphorus 2 8 2 3 - 4 1 mg/dL   Protein / creatinine ratio, urine   Result Value Ref Range    Creatinine, Ur 85 9 mg/dL    Protein Urine Random 12 mg/dL    Prot/Creat Ratio, Ur 0 14 (H) 0 00 - 0 10   PTH, intact   Result Value Ref Range     3 (H) 18 4 - 80 1 pg/mL   Urinalysis with microscopic   Result Value Ref Range    Color, UA Light Yellow     Clarity, UA Clear     Specific Gravity, UA 1 021 1 003 - 1 030    pH, UA 5 5 4 5, 5 0, 5 5, 6 0, 6 5, 7 0, 7 5, 8 0    Leukocytes, UA Negative Negative    Nitrite, UA Negative Negative    Protein, UA Negative Negative mg/dl    Glucose, UA Negative Negative mg/dl    Ketones, UA Negative Negative mg/dl    Urobilinogen, UA <2 0 <2 0 mg/dl mg/dl    Bilirubin, UA Negative Negative    Occult Blood, UA Negative Negative    RBC, UA None Seen None Seen, 1-2 /hpf    WBC, UA 1-2 None Seen, 1-2 /hpf    Epithelial Cells None Seen None Seen, Occasional /hpf    Bacteria, UA None Seen None Seen, Occasional /hpf    Transitional Epithelial Cells Present

## 2023-01-16 NOTE — PATIENT INSTRUCTIONS
1) Avoid NSAIDS - (Example - motrin, advil, ibuprofen, aleve, exederin, etc)  2) Always follow a low salt diet  3) labwork in 5 months and appt in 5 months  4) eliquis should be twice daily  5) continue rest of medications as you are doing

## 2023-02-11 DIAGNOSIS — I49.1 PAC (PREMATURE ATRIAL CONTRACTION): ICD-10-CM

## 2023-02-11 DIAGNOSIS — I50.33 ACUTE ON CHRONIC DIASTOLIC CONGESTIVE HEART FAILURE (HCC): ICD-10-CM

## 2023-02-11 DIAGNOSIS — I10 BENIGN ESSENTIAL HTN: ICD-10-CM

## 2023-02-11 DIAGNOSIS — Z79.01 CURRENT USE OF LONG TERM ANTICOAGULATION: ICD-10-CM

## 2023-02-11 DIAGNOSIS — I34.0 MODERATE TO SEVERE MITRAL REGURGITATION: ICD-10-CM

## 2023-02-11 DIAGNOSIS — I48.0 PAROXYSMAL ATRIAL FIBRILLATION (HCC): ICD-10-CM

## 2023-02-11 DIAGNOSIS — I10 BENIGN ESSENTIAL HYPERTENSION: ICD-10-CM

## 2023-02-13 RX ORDER — SPIRONOLACTONE 25 MG/1
12.5 TABLET ORAL DAILY
Qty: 45 TABLET | Refills: 3 | Status: SHIPPED | OUTPATIENT
Start: 2023-02-13

## 2023-02-13 RX ORDER — BUMETANIDE 2 MG/1
2 TABLET ORAL DAILY
Qty: 90 TABLET | Refills: 3 | Status: SHIPPED | OUTPATIENT
Start: 2023-02-13

## 2023-02-14 ENCOUNTER — OFFICE VISIT (OUTPATIENT)
Dept: PODIATRY | Facility: CLINIC | Age: 83
End: 2023-02-14

## 2023-02-14 VITALS
SYSTOLIC BLOOD PRESSURE: 128 MMHG | WEIGHT: 162 LBS | RESPIRATION RATE: 17 BRPM | DIASTOLIC BLOOD PRESSURE: 66 MMHG | HEIGHT: 61 IN | BODY MASS INDEX: 30.58 KG/M2

## 2023-02-14 DIAGNOSIS — B35.1 ONYCHOMYCOSIS: ICD-10-CM

## 2023-02-14 DIAGNOSIS — I70.209 PERIPHERAL ARTERIOSCLEROSIS (HCC): Primary | ICD-10-CM

## 2023-02-14 DIAGNOSIS — M79.672 PAIN IN BOTH FEET: ICD-10-CM

## 2023-02-14 DIAGNOSIS — M79.671 PAIN IN BOTH FEET: ICD-10-CM

## 2023-02-14 NOTE — PROGRESS NOTES
Assessment/Plan: Pain upon ambulation  Peripheral artery disease  Mycosis of nail  Plan  Foot exam performed  Patient educated on condition  All nails debrided without pain or complication  Diagnoses and all orders for this visit:    Peripheral arteriosclerosis ([address] )    Pain in both feet    Onychomycosis          Subjective: Patient has pain  She has pain in her toes and feet with ambulation  No history of trauma      No Known Allergies      Current Outpatient Medications:   •  amLODIPine (NORVASC) 10 mg tablet, Take 0 5 tablets (5 mg total) by mouth daily, Disp: 90 tablet, Rfl: 0  •  apixaban (Eliquis) 2 5 mg, Take 1 tablet (2 5 mg total) by mouth 2 (two) times a day, Disp: 180 tablet, Rfl: 3  •  atorvastatin (LIPITOR) 40 mg tablet, Take 1 tablet (40 mg total) by mouth every evening, Disp: 90 tablet, Rfl: 1  •  bumetanide (BUMEX) 2 mg tablet, Take 1 tablet (2 mg total) by mouth daily, Disp: 90 tablet, Rfl: 3  •  levothyroxine 88 mcg tablet, Take 1 tablet (88 mcg total) by mouth daily, Disp: 90 tablet, Rfl: 1  •  losartan (COZAAR) 25 mg tablet, TAKE 2 TABLETS IN THE MORNING AND TAKE 1 TABLET IN THE EVENING, Disp: 270 tablet, Rfl: 3  •  metoprolol tartrate (LOPRESSOR) 25 mg tablet, Take 1 tablet (25 mg total) by mouth every 12 (twelve) hours, Disp: 180 tablet, Rfl: 0  •  pantoprazole (PROTONIX) 20 mg tablet, Take 1 tablet (20 mg total) by mouth daily, Disp: 90 tablet, Rfl: 0  •  spironolactone (ALDACTONE) 25 mg tablet, Take 0 5 tablets (12 5 mg total) by mouth daily, Disp: 45 tablet, Rfl: 3    Patient Active Problem List   Diagnosis   • Benign essential hypertension   • Chronic diastolic (congestive) heart failure (HCC)   • Hypothyroidism   • Arthropathy of knee   • Hallux abductovalgus with bunions, unspecified laterality   • CKD (chronic kidney disease), stage III (HCC)   • Diverticulitis of colon   • Chronic gout without tophus   • Hiatal hernia   • Hyperlipemia   • Hyperuricemia   • Nephrolithiasis   • WENDI (obstructive sleep apnea)   • Pseudogout of left wrist   • Gastroesophageal reflux disease without esophagitis   • Exertional shortness of breath   • Prediabetes   • Status post right frontotemporal replacement cranioplasty   • BMI 38 0-38 9,adult   • Sciatica of left side   • Spinal stenosis of lumbar region with neurogenic claudication          Patient ID: Antonietta Lopez is a 79 y o  female  HPI    The following portions of the patient's history were reviewed and updated as appropriate:     family history includes Arthritis in her family and mother; Coronary artery disease in her mother; Diabetes in her brother, mother, and son; Hypertension in her father and mother  reports that she has never smoked  She has never used smokeless tobacco  She reports current alcohol use  She reports that she does not use drugs  Vitals:    02/14/23 1548   BP: 128/66   Resp: 17       Review of Systems      Objective:  Patient's shoes and socks removed  Foot ExamPhysical Exam      Physical Exam   Left Foot: Appearance: Normal except as noted: excessive pronation-- and-- pes planus     Right Foot: Appearance: Normal except as noted: excessive pronation-- and-- pes planus  Tenderness: None except the calcaneous,-- medial calcaneous-- and-- insertion of the plantar fascia   Patient has an enlarged hallux valgus deformity with inflamed bunion  Left Ankle: Appearance: Normal except ecchymosis-- and-- swelling laterally  ROM: limited ROM in all planes    Right Ankle: ROM: limited ROM in all planes Motor: diffuse weakness  Pain with palpation anterior lateral aspect right ankle joint mortise     Neurological Exam: performed  Light touch was intact bilaterally  Vibratory sensation was intact bilaterally  Response to monofilament test was intact bilaterally   Deep tendon reflexes: patellar reflex present bilaterally-- and-- achilles reflex present bilaterally     Vascular Exam: performed Dorsalis pedis pulses were 1/4 bilaterally  Posterior tibial pulses were 1/4 bilaterally  Elevation Pallor: diminished bilaterally  Capillary refill time was greater than 3 seconds bilaterally-- and-- Q9 findings bilateral  Negative digital hair noted  Positive abnormal cooling bilateral  Edema: moderate bilaterally-- and-- 6/7 pitting edema  Negative Homans sign     Toenails: All of the toenails were elongated,-- hypertrophied,-- discolored-- and-- Mycotic with onychogryphosis  Note is made of bilateral tinea pedis in moccasin foot  Distribution          Socks and shoes removed, Right Foot Findings: swollen, erythematous and dry       The sensory exam showed diminished vibratory sensation at the level of the toes  Diminished tactile sensation with monofilament testing throughout the right foot       Socks and shoes removed, Left Foot Findings: swollen, erythematous and dry       The sensory exam showed diminished vibratory sensation at the level of the toes  Diminished tactile sensation with monofilament testing throughout the left foot      Capillary refills findings on the right were delayed in the toes       Pulses:      1+ in the posterior tibialis on the right      1+ in the dorsalis pedis on the right       Capillary refills findings on the left were delayed in the toes       Pulses:      1+ in the posterior tibialis on the left      1+ in the dorsalis pedis on the left       Assign Risk Category: 2: Loss of protective sensation with or without weakness, deformity, callus, pre-ulcer, or history of ulceration  High risk     Hyperkeratosis: present on both first toes,-- present on both first sub metatarsals-- and-- Bilateral plantar moccasin tinea pedis noted     Shoe Gear Evaluation: performed ()   Recommendation(s): LOBITO style

## 2023-02-15 RX ORDER — AMLODIPINE BESYLATE 10 MG/1
5 TABLET ORAL DAILY
Qty: 90 TABLET | Refills: 0 | Status: SHIPPED | OUTPATIENT
Start: 2023-02-15

## 2023-02-17 NOTE — ASSESSMENT & PLAN NOTE
Wt Readings from Last 3 Encounters:   07/01/22 72 6 kg (160 lb)   06/20/22 75 3 kg (166 lb)   05/27/22 74 8 kg (165 lb)     Asymptomatic  Continue monitoring/management per Cardiology  Spironolactone Counseling: Patient advised regarding risks of diarrhea, abdominal pain, hyperkalemia, birth defects (for female patients), liver toxicity and renal toxicity. The patient may need blood work to monitor liver and kidney function and potassium levels while on therapy. The patient verbalized understanding of the proper use and possible adverse effects of spironolactone.  All of the patient's questions and concerns were addressed.

## 2023-03-18 DIAGNOSIS — I49.1 PAC (PREMATURE ATRIAL CONTRACTION): ICD-10-CM

## 2023-03-18 DIAGNOSIS — I48.0 PAROXYSMAL ATRIAL FIBRILLATION (HCC): ICD-10-CM

## 2023-03-18 DIAGNOSIS — E03.9 HYPOTHYROIDISM, UNSPECIFIED TYPE: Chronic | ICD-10-CM

## 2023-03-20 RX ORDER — PANTOPRAZOLE SODIUM 20 MG/1
20 TABLET, DELAYED RELEASE ORAL DAILY
Qty: 90 TABLET | Refills: 0 | Status: SHIPPED | OUTPATIENT
Start: 2023-03-20

## 2023-03-20 RX ORDER — LEVOTHYROXINE SODIUM 88 UG/1
88 TABLET ORAL DAILY
Qty: 90 TABLET | Refills: 0 | Status: SHIPPED | OUTPATIENT
Start: 2023-03-20

## 2023-03-24 ENCOUNTER — OFFICE VISIT (OUTPATIENT)
Dept: PODIATRY | Facility: CLINIC | Age: 83
End: 2023-03-24

## 2023-03-24 VITALS
DIASTOLIC BLOOD PRESSURE: 66 MMHG | WEIGHT: 162 LBS | HEIGHT: 61 IN | RESPIRATION RATE: 17 BRPM | BODY MASS INDEX: 30.58 KG/M2 | SYSTOLIC BLOOD PRESSURE: 128 MMHG

## 2023-03-24 DIAGNOSIS — M79.671 RIGHT FOOT PAIN: ICD-10-CM

## 2023-03-24 DIAGNOSIS — B35.1 ONYCHOMYCOSIS: ICD-10-CM

## 2023-03-24 DIAGNOSIS — B35.9 DERMATOPHYTOSIS: ICD-10-CM

## 2023-03-24 DIAGNOSIS — M20.11 HALLUX VALGUS OF RIGHT FOOT: Primary | ICD-10-CM

## 2023-03-24 DIAGNOSIS — M20.12 HALLUX VALGUS OF LEFT FOOT: ICD-10-CM

## 2023-03-24 DIAGNOSIS — M79.672 LEFT FOOT PAIN: ICD-10-CM

## 2023-03-24 RX ORDER — KETOCONAZOLE 20 MG/G
CREAM TOPICAL DAILY
Qty: 60 G | Refills: 1 | Status: SHIPPED | OUTPATIENT
Start: 2023-03-24 | End: 2023-04-23

## 2023-03-24 NOTE — PROGRESS NOTES
Assessment/Plan: Pain upon ambulation  Foot pain bilateral   Inflamed hallux valgus deformity bilateral   Mycosis of nail and skin  Plan  Chart reviewed  Patient examined  Patient advised on condition  Today bilateral arthrocentesis done  1 cc Kenalog 10 injected into each first MPJ without pain or complication  All nails debrided  Patient will be started on topical antifungal          Diagnoses and all orders for this visit:    Hallux valgus of right foot    Hallux valgus of left foot    Right foot pain    Left foot pain    Dermatophytosis  -     ketoconazole (NIZORAL) 2 % cream; Apply topically daily    Onychomycosis  -     ketoconazole (NIZORAL) 2 % cream; Apply topically daily          Subjective: Patient has pain in her bunions  She is requesting injection therapy  She also has dry itchy scaly skin on her feet      No Known Allergies      Current Outpatient Medications:   •  apixaban (Eliquis) 2 5 mg, Take 1 tablet (2 5 mg total) by mouth 2 (two) times a day, Disp: 180 tablet, Rfl: 1  •  ketoconazole (NIZORAL) 2 % cream, Apply topically daily, Disp: 60 g, Rfl: 1  •  amLODIPine (NORVASC) 10 mg tablet, Take 0 5 tablets (5 mg total) by mouth daily, Disp: 90 tablet, Rfl: 0  •  atorvastatin (LIPITOR) 40 mg tablet, Take 1 tablet (40 mg total) by mouth every evening, Disp: 90 tablet, Rfl: 1  •  bumetanide (BUMEX) 2 mg tablet, Take 1 tablet (2 mg total) by mouth daily, Disp: 90 tablet, Rfl: 3  •  levothyroxine 88 mcg tablet, Take 1 tablet (88 mcg total) by mouth daily, Disp: 90 tablet, Rfl: 0  •  losartan (COZAAR) 25 mg tablet, TAKE 2 TABLETS IN THE MORNING AND TAKE 1 TABLET IN THE EVENING, Disp: 270 tablet, Rfl: 3  •  metoprolol tartrate (LOPRESSOR) 25 mg tablet, Take 1 tablet (25 mg total) by mouth every 12 (twelve) hours, Disp: 180 tablet, Rfl: 0  •  pantoprazole (PROTONIX) 20 mg tablet, Take 1 tablet (20 mg total) by mouth daily, Disp: 90 tablet, Rfl: 0  •  spironolactone (ALDACTONE) 25 mg tablet, Take 0 5 tablets (12 5 mg total) by mouth daily, Disp: 45 tablet, Rfl: 3    Patient Active Problem List   Diagnosis   • Benign essential hypertension   • Chronic diastolic (congestive) heart failure (HCC)   • Hypothyroidism   • Arthropathy of knee   • Hallux abductovalgus with bunions, unspecified laterality   • CKD (chronic kidney disease), stage III (HCC)   • Diverticulitis of colon   • Chronic gout without tophus   • Hiatal hernia   • Hyperlipemia   • Hyperuricemia   • Nephrolithiasis   • WENDI (obstructive sleep apnea)   • Pseudogout of left wrist   • Gastroesophageal reflux disease without esophagitis   • Exertional shortness of breath   • Prediabetes   • Status post right frontotemporal replacement cranioplasty   • BMI 38 0-38 9,adult   • Sciatica of left side   • Spinal stenosis of lumbar region with neurogenic claudication          Patient ID: Micah Leon is a 79 y o  female  HPI    The following portions of the patient's history were reviewed and updated as appropriate:     family history includes Arthritis in her family and mother; Coronary artery disease in her mother; Diabetes in her brother, mother, and son; Hypertension in her father and mother  reports that she has never smoked  She has never used smokeless tobacco  She reports current alcohol use  She reports that she does not use drugs  Vitals:    03/24/23 1407   BP: 128/66   Resp: 17       Review of Systems      Objective:  Patient's shoes and socks removed  Foot ExamPhysical Exam      Physical Exam   Left Foot: Appearance: Normal except as noted: excessive pronation-- and-- pes planus     Right Foot: Appearance: Normal except as noted: excessive pronation-- and-- pes planus  Tenderness: None except the calcaneous,-- medial calcaneous-- and-- insertion of the plantar fascia   Patient has an enlarged hallux valgus deformity with inflamed bunion  Left Ankle: Appearance: Normal except ecchymosis-- and-- swelling laterally   ROM: limited ROM in all planes    Right Ankle: ROM: limited ROM in all planes Motor: diffuse weakness   Pain with palpation anterior lateral aspect right ankle joint mortise     Neurological Exam: performed  Light touch was intact bilaterally  Vibratory sensation was intact bilaterally  Response to monofilament test was intact bilaterally  Deep tendon reflexes: patellar reflex present bilaterally-- and-- achilles reflex present bilaterally     Vascular Exam: performed Dorsalis pedis pulses were 1/4 bilaterally  Posterior tibial pulses were 1/4 bilaterally  Elevation Pallor: diminished bilaterally  Capillary refill time was greater than 3 seconds bilaterally-- and-- Q9 findings bilateral  Negative digital hair noted  Positive abnormal cooling bilateral  Edema: moderate bilaterally-- and-- 6/7 pitting edema  Negative Homans sign     Toenails: All of the toenails were elongated,-- hypertrophied,-- discolored-- and-- Mycotic with onychogryphosis  Note is made of bilateral tinea pedis in moccasin foot  Distribution          Socks and shoes removed, Right Foot Findings: swollen, erythematous and dry       The sensory exam showed diminished vibratory sensation at the level of the toes  Diminished tactile sensation with monofilament testing throughout the right foot       Socks and shoes removed, Left Foot Findings: swollen, erythematous and dry       The sensory exam showed diminished vibratory sensation at the level of the toes  Diminished tactile sensation with monofilament testing throughout the left foot      Capillary refills findings on the right were delayed in the toes       Pulses:      1+ in the posterior tibialis on the right      1+ in the dorsalis pedis on the right       Capillary refills findings on the left were delayed in the toes        Pulses:      1+ in the posterior tibialis on the left      1+ in the dorsalis pedis on the left       Assign Risk Category: 2: Loss of protective sensation with or without weakness, deformity, callus, pre-ulcer, or history of ulceration  High risk     Hyperkeratosis: present on both first toes,-- present on both first sub metatarsals-- and-- Bilateral plantar moccasin tinea pedis noted     Shoe Gear Evaluation: performed ()   Recommendation(s): SAS style

## 2023-04-24 ENCOUNTER — OFFICE VISIT (OUTPATIENT)
Dept: CARDIOLOGY CLINIC | Facility: CLINIC | Age: 83
End: 2023-04-24

## 2023-04-24 ENCOUNTER — TELEPHONE (OUTPATIENT)
Dept: CARDIOLOGY CLINIC | Facility: CLINIC | Age: 83
End: 2023-04-24

## 2023-04-24 VITALS
HEIGHT: 61 IN | HEART RATE: 74 BPM | SYSTOLIC BLOOD PRESSURE: 128 MMHG | BODY MASS INDEX: 30.02 KG/M2 | WEIGHT: 159 LBS | OXYGEN SATURATION: 99 % | DIASTOLIC BLOOD PRESSURE: 62 MMHG

## 2023-04-24 DIAGNOSIS — I48.91 ATRIAL FIBRILLATION BY ELECTROCARDIOGRAM (HCC): ICD-10-CM

## 2023-04-24 DIAGNOSIS — I34.0 MODERATE TO SEVERE MITRAL REGURGITATION: ICD-10-CM

## 2023-04-24 DIAGNOSIS — I50.32 CHRONIC DIASTOLIC (CONGESTIVE) HEART FAILURE (HCC): ICD-10-CM

## 2023-04-24 NOTE — PROGRESS NOTES
Cardiology Follow Up  Ulis Holstein [facility]  1940  [phone]  NANDO ANDERSON [facility] [address]  [facility]  [address] [address] [address]    1  Moderate to severe mitral regurgitation  apixaban (Eliquis) 2 5 mg      2  Chronic diastolic (congestive) heart failure (HCC)  apixaban (Eliquis) 2 5 mg      3  Atrial fibrillation by electrocardiogram (HCC)  apixaban (Eliquis) 2 5 mg         Discussion/Plan:  Atrial fibrillation persistent-  Continue metoprolol 12 5 mg twice a day  Eliquis 2 5 mg       Moderate to severe mitral regurgitation- blood pressure optimized  Continue amlodipine 10 mg  Losartan 50 mg morning and 50mgh in evening  Bumex 2 mg daily  We discussed about tight blood pressure control  She will continue with a low-salt diet  Her blood pressures have been optimized  Possible consideration for MitraClip evaluation  IF worsens or symptoms, plan to consider mitral clip  Recheck     Intraparenchymal bleed/cranioplasty- previous history which makes her at higher risk for anticoagulation usage  Acute on chronic diastolic heart failure with a component of lymphedema- improved volume status  Lower ext better  - Weight loss  - Compression socks + wedge pillow  - Increase bumex 2mg daily +  - Low-salt diet   - Elevation of legs + walking  -- if with continued lower extremity edema consideration for compression boots    Chronic dyspnea-I believe this is multifactorial including chronic deconditioning from severe back pain  BMI of 41  Some retained lower extremity edema  Her PFTs were reviewed which also show some restriction    She needs to started structured exercise program   Possible swimming or water sports given her severe back discomfort     Sleep apnea  -- cpap mask titration    Incomplete rbbb    Acute on chronic kidney disease follow-up with renal  - continue cozaar +  - Bumex 2mg daily    Triglycerides- controlled  - 4000 mg omega-3 daily    WENDI- on cpap      Interval History:  She denies having chest pain  Her edema is better  Taking omega 3 currently  No bleeding or bruising  No dizziness or light-headness  Blood pressure very well controlled  Has back pain  12/11/2018:  She reports having some exertional shortness of breath  Her lower extremity edema has improved but is persistent  She denies feeling dizziness or lightheadedness  She denies having any falls  She denies having any bleeding or bruising     06/12/2019:  She reports some improvement in her shortness of breath but still has dyspnea while sitting  She is unable to walk secondary to severe back pain  She denies feeling dizziness or lightheadedness  Her lower extremity edema has improved  She is trying a elevator legs  She does wear compression socks  She is compliant with CPAP     31/20: [de-identified]year-old with recent intraparenchymal hemorrhage status post right decompressive craniectomy and evacuation of intraparenchymal hematoma  She is pending follow-up bone flap by neuro surgery  She tolerated her previous procedure without evidence of decompensated heart failure  She was noted to have some bradycardia and her beta-blocker was discontinued  She is compliant with her antihypertensive medications  She is compliant with her diuretics  She denies having significant worsening in her chronic dyspnea or lower extremity edema    02/24/2021:  She denies currently feeling significant shortness of breath  She reports her lower extremity swelling is well controlled  We reviewed through her recent EKG  No prior history of atrial fibrillation  She is compliant with her medications  Denies having any falls  06/07/2021:  Her weight has been stable  She denies having major lower extremity swelling    We reviewed through her last echocardiogram   Reviewed through her Holter monitor which did not show evidence of atrial fibrillation or atrial flutter  She is compliant with her CPAP  She is compliant with her diuretics  Reviewed her last lab work  01/21/2022:  Her blood pressures have been controlled  She denies having major shortness of breath  She denies having chest heaviness  We reviewed through her recent echocardiogram   No major change  05/27/2022:  She had a mechanical fall 2 weeks ago  She is now seen in atrial fibrillation newly recognized  Her last event monitor from last year was negative for atrial fibrillation  She denies having major palpitations  She has chronic shortness of breath  She states her weight has been stable on her lower extremity swelling has been also stable  She has been compliant with her diuretics  She denies feeling dizziness  06/21/2022:  She denies having recurrence of fall  She still has some exertional dyspnea  However overall she is feeling well  We reviewed her event monitor  S she is awaiting CPAP mask titration    08/04/2022:  She denies having major bleeding  She is wearing her CPAP at night  She denies having any falls  We reviewed through her event monitor  12/19/2022: She is compliant with her CPAP  Her breathing has been stable  She denies major bleeding  She states lower extremity swelling has been stable  4/24/2023: Denies having dizziness  Denies any major falls  No major bleeding  She is in controlled atrial fibrillation her heart rate is in the 70s  Her blood pressure is very well controlled  Denies major change in volume  Denies PND orthopnea      Patient Active Problem List   Diagnosis   • Benign essential hypertension   • Chronic diastolic (congestive) heart failure (HCC)   • Hypothyroidism   • Arthropathy of knee   • Hallux abductovalgus with bunions, unspecified laterality   • CKD (chronic kidney disease), stage III ([address] )   • Diverticulitis of colon   • Chronic gout without tophus   • Hiatal hernia   • Hyperlipemia   • Hyperuricemia   • Nephrolithiasis   • WENDI (obstructive sleep apnea)   • Pseudogout of left wrist   • Gastroesophageal reflux disease without esophagitis   • Exertional shortness of breath   • Prediabetes   • Status post right frontotemporal replacement cranioplasty   • BMI 38 0-38 9,adult   • Sciatica of left side   • Spinal stenosis of lumbar region with neurogenic claudication     Past Medical History:   Diagnosis Date   • Anxiety    • Arthritis     joints   • Cataract    • Disease of thyroid gland    • Eczema    • GERD (gastroesophageal reflux disease)    • Gout    • Heart failure (HCC)    • Hepatitis    • Hiatal hernia    • Hypertension    • Onychomycosis     last assessed: 4/29/16   • Orthopnea     resolved: 1/8/16   • Pseudogout of left wrist    • Sleep apnea     wears CPAP   • Stroke [facility])      Social History     Socioeconomic History   • Marital status:      Spouse name: Not on file   • Number of children: Not on file   • Years of education: Not on file   • Highest education level: Not on file   Occupational History   • Not on file   Tobacco Use   • Smoking status: Never   • Smokeless tobacco: Never   Vaping Use   • Vaping Use: Never used   Substance and Sexual Activity   • Alcohol use: Yes     Alcohol/week: 0 0 standard drinks     Comment: Rare     • Drug use: Never   • Sexual activity: Not Currently     Birth control/protection: Post-menopausal   Other Topics Concern   • Not on file   Social History Narrative    Daily coffee consumption    Lack of exercise    Sleeps 6-7 hours a day     Social Determinants of Health     Financial Resource Strain: Not on file   Food Insecurity: Not on file   Transportation Needs: Not on file   Physical Activity: Not on file   Stress: Not on file   Social Connections: Not on file   Intimate Partner Violence: Not on file   Housing Stability: Not on file      Family History   Problem Relation Age of Onset   • Diabetes Mother    • Coronary artery disease Mother    • Arthritis Mother    • Hypertension Mother • Hypertension Father    • Diabetes Brother    • Diabetes Son    • Arthritis Family      Past Surgical History:   Procedure Laterality Date   • ABDOMINAL SURGERY         • BRAIN HEMATOMA EVACUATION Right 2020    Procedure: Right decompressive craniectomy and evacuation of intraparenchymal hematoma; Surgeon: Nathan Alejandre MD;  Location: [facility];  Service: Neurosurgery   • BREAST SURGERY Right     cyst removal   • CARPAL TUNNEL RELEASE Left    • CARPAL TUNNEL RELEASE Right    • CATARACT EXTRACTION     •  SECTION  1960    x1   • COLONOSCOPY N/A 2018    Procedure: COLONOSCOPY;  Surgeon: Judith Lovett MD;  Location[facility] GI LAB; Service: Gastroenterology   • DILATION AND CURETTAGE OF UTERUS     • EYE SURGERY Left     cataracts   • HERNIA REPAIR      umbilical hernia   • INCISIONAL HERNIA REPAIR      incarcerated   • KNEE ARTHROSCOPY Right    • IN RPLCMT BONE FLAP/PROSTHETIC PLATE SKULL Right 3/1/8223    Procedure: right frontotemporal replacement cranioplasty;  Surgeon: Nathan Alejandre MD;  Location: [facility];  Service: Neurosurgery   • IN XCAPSL CTRC RMVL INSJ IO LENS PROSTH W/O ECP Right 3/27/2017    Procedure: EXTRACTION EXTRACAPSULAR CATARACT PHACO INTRAOCULAR LENS (IOL);   Surgeon: Tammie Pérez MD;  Location: [facility] MAIN OR;  Service: Ophthalmology       Current Outpatient Medications:   •  amLODIPine (NORVASC) 10 mg tablet, Take 0 5 tablets (5 mg total) by mouth daily, Disp: 90 tablet, Rfl: 0  •  apixaban (Eliquis) 2 5 mg, Take 1 tablet (2 5 mg total) by mouth 2 (two) times a day, Disp: 180 tablet, Rfl: 2  •  atorvastatin (LIPITOR) 40 mg tablet, Take 1 tablet (40 mg total) by mouth every evening, Disp: 90 tablet, Rfl: 1  •  bumetanide (BUMEX) 2 mg tablet, Take 1 tablet (2 mg total) by mouth daily, Disp: 90 tablet, Rfl: 3  •  levothyroxine 88 mcg tablet, Take 1 tablet (88 mcg total) by mouth daily, Disp: 90 tablet, Rfl: 0  •  losartan (COZAAR) 25 mg tablet, TAKE 2 TABLETS IN THE MORNING AND TAKE 1 TABLET IN THE EVENING, Disp: 270 tablet, Rfl: 3  •  metoprolol tartrate (LOPRESSOR) 25 mg tablet, Take 1 tablet (25 mg total) by mouth every 12 (twelve) hours, Disp: 180 tablet, Rfl: 0  •  pantoprazole (PROTONIX) 20 mg tablet, Take 1 tablet (20 mg total) by mouth daily, Disp: 90 tablet, Rfl: 0  •  spironolactone (ALDACTONE) 25 mg tablet, Take 0 5 tablets (12 5 mg total) by mouth daily, Disp: 45 tablet, Rfl: 3  •  ketoconazole (NIZORAL) 2 % cream, Apply topically daily, Disp: 60 g, Rfl: 1  No Known Allergies    Review of Systems:  Review of Systems   Constitutional: Positive for fatigue  Negative for activity change, appetite change, chills, diaphoresis, fever and unexpected weight change  HENT: Negative  Negative for congestion, dental problem, drooling, ear discharge, ear pain, facial swelling, hearing loss, mouth sores, nosebleeds, postnasal drip, rhinorrhea, sinus pressure, sinus pain, sneezing, sore throat, tinnitus, trouble swallowing and voice change  Eyes: Negative  Negative for photophobia, pain, redness, itching and visual disturbance  Respiratory: Positive for shortness of breath  Negative for apnea, cough, choking, chest tightness, wheezing and stridor  Cardiovascular: Positive for leg swelling  Negative for chest pain and palpitations  Gastrointestinal: Negative  Negative for abdominal distention, abdominal pain, anal bleeding, blood in stool, constipation, diarrhea, nausea, rectal pain and vomiting  Endocrine: Negative  Negative for cold intolerance, heat intolerance, polydipsia, polyphagia and polyuria  Genitourinary: Negative  Negative for decreased urine volume, difficulty urinating, dyspareunia, dysuria, enuresis, flank pain, frequency, genital sores, hematuria, menstrual problem, pelvic pain, urgency, vaginal bleeding, vaginal discharge and vaginal pain  Musculoskeletal: Positive for back pain   Negative for arthralgias, gait problem, "joint swelling, myalgias, neck pain and neck stiffness  Skin: Negative  Negative for color change, pallor, rash and wound  Allergic/Immunologic: Negative  Negative for environmental allergies, food allergies and immunocompromised state  Neurological: Negative  Negative for dizziness, tremors, seizures, syncope, facial asymmetry, speech difficulty, weakness, light-headedness, numbness and headaches  Hematological: Negative for adenopathy  Bruises/bleeds easily  Psychiatric/Behavioral: Negative  Negative for agitation, behavioral problems, confusion, decreased concentration, dysphoric mood, hallucinations, self-injury, sleep disturbance and suicidal ideas  The patient is not nervous/anxious and is not hyperactive  All other systems reviewed and are negative  Vitals:    04/24/23 1029   BP: 128/62   BP Location: Right arm   Patient Position: Sitting   Cuff Size: Large   Pulse: 74   SpO2: 99%   Weight: 72 1 kg (159 lb)   Height: 5' 1\" (1 549 m)     Physical Exam:  Physical Exam  Vitals and nursing note reviewed  Constitutional:       General: She is not in acute distress  Appearance: She is well-developed  She is not diaphoretic  HENT:      Head: Normocephalic and atraumatic  Right Ear: External ear normal       Left Ear: External ear normal    Eyes:      General: No scleral icterus  Right eye: No discharge  Left eye: No discharge  Conjunctiva/sclera: Conjunctivae normal       Pupils: Pupils are equal, round, and reactive to light  Neck:      Thyroid: No thyromegaly  Vascular: No JVD  Trachea: No tracheal deviation  Cardiovascular:      Rate and Rhythm: Normal rate  Rhythm irregular  Heart sounds: Murmur heard  No friction rub  Gallop present  Pulmonary:      Effort: Pulmonary effort is normal  No respiratory distress  Breath sounds: Normal breath sounds  No stridor  No wheezing or rales  Chest:      Chest wall: No tenderness   " Abdominal:      General: Bowel sounds are normal  There is no distension  Palpations: Abdomen is soft  There is no mass  Tenderness: There is no abdominal tenderness  There is no guarding or rebound  Musculoskeletal:         General: Swelling present  No tenderness or deformity  Normal range of motion  Cervical back: Normal range of motion and neck supple  Skin:     General: Skin is warm and dry  Coloration: Skin is not pale  Findings: Bruising and lesion present  No erythema or rash  Neurological:      Mental Status: She is alert and oriented to person, place, and time  Cranial Nerves: No cranial nerve deficit  Motor: No abnormal muscle tone  Coordination: Coordination normal       Deep Tendon Reflexes: Reflexes are normal and symmetric  Psychiatric:         Behavior: Behavior normal          Thought Content:  Thought content normal          Judgment: Judgment normal          Labs:     Lab Results   Component Value Date    WBC 6 48 12/27/2022    HGB 11 9 12/27/2022    HCT 38 4 12/27/2022    MCV 93 12/27/2022     12/27/2022     Lab Results   Component Value Date    K 3 9 12/27/2022     12/27/2022    CO2 29 12/27/2022    BUN 41 (H) 12/27/2022    CREATININE 1 11 12/27/2022    GLUCOSE 124 02/11/2020    GLUF 98 12/27/2022    CALCIUM 9 5 12/27/2022    CORRECTEDCA 10 0 12/27/2022    AST 13 12/27/2022    ALT 20 12/27/2022    ALKPHOS 96 12/27/2022    EGFR 46 12/27/2022     Lab Results   Component Value Date    CHOL 191 12/27/2013    CHOL 218 09/21/2013     Lab Results   Component Value Date    HDL 54 12/27/2022    HDL 46 (L) 06/24/2022    HDL 62 11/30/2021     Lab Results   Component Value Date    LDLCALC 48 12/27/2022    LDLCALC 45 06/24/2022    LDLCALC 47 11/30/2021     Lab Results   Component Value Date    TRIG 96 12/27/2022    TRIG 92 06/24/2022    TRIG 73 11/30/2021     Lab Results   Component Value Date    HGBA1C 5 6 12/27/2022       Imaging & Testing   I have personally reviewed pertinent reports  EKG: Personally reviewed  Normal sinus rhythm no acute st/t wave    Cardiac testing:   Results for orders placed during the hospital encounter of 01/15/16   Echo complete with contrast if indicated    Narrative [address]  [address] [facility]  [address]  [phone]    Transthoracic Echocardiogram  2D, M-mode, Doppler, and Color Doppler    Study date:  15-Berny-2016    Patient: Nabeel Casas  MR number: [id number]  Account number: [de-identified]  : 1940  Age: 67 years  Gender: Female  Status: Routine  Location: Echo lab  Height: 61 in  Weight: 214 5 lb  BP: 132/ 84 mmHg    Indications: HTN    Diagnoses: 401 9 - HYPERTENSION NOS    Sonographer:  Edgardo Jefferson  Primary Physician:  Marimar Acevedo  Referring Physician:  Phill Deluca:  Vernon Troncoso  Interpreting Physician:  DO AFIA Cheng    LEFT VENTRICLE:  Systolic function was at the lower limits of normal  Ejection fraction was  estimated in the range of 50 % to 55 %  There were no regional wall motion abnormalities  There was moderate concentric hypertrophy  Features were consistent with a pseudonormal left ventricular filling pattern,  with concomitant abnormal relaxation and increased filling pressure (grade 2  diastolic dysfunction)  Doppler parameters were consistent with elevated mean  left atrial filling pressure  LEFT ATRIUM:  The atrium was moderately dilated  RIGHT ATRIUM:  The atrium was markedly dilated  MITRAL VALVE:  There was marked annular calcification  There was moderate regurgitation  TRICUSPID VALVE:  There was mild regurgitation  Pulmonary artery systolic pressure was mildly increased  Estimated peak PA pressure was 46 mmHg  HISTORY: PRIOR HISTORY: Patient has no history of cardiovascular disease  PROCEDURE: The procedure was performed in the echo lab  This was a routine  study   The transthoracic approach was used  The study included complete 2D  imaging, M-mode, complete spectral Doppler, and color Doppler  The heart rate  was 77 bpm, at the start of the study  Echocardiographic views were limited due  to poor acoustic window availability and decreased penetration  This was a  technically difficult study  LEFT VENTRICLE: Size was normal  Systolic function was at the lower limits of  normal  Ejection fraction was estimated in the range of 50 % to 55 %  There  were no regional wall motion abnormalities  There was moderate concentric  hypertrophy  DOPPLER: Features were consistent with a pseudonormal left  ventricular filling pattern, with concomitant abnormal relaxation and increased  filling pressure (grade 2 diastolic dysfunction)  Doppler parameters were  consistent with elevated mean left atrial filling pressure  RIGHT VENTRICLE: The size was normal  Systolic function was normal  DOPPLER:  Systolic pressure was within the normal range  LEFT ATRIUM: The atrium was moderately dilated  No thrombus was identified  RIGHT ATRIUM: The atrium was markedly dilated  MITRAL VALVE: There was marked annular calcification  Valve structure was  normal  There was normal leaflet separation  No echocardiographic evidence for  prolapse  DOPPLER: The transmitral velocity was within the normal range  There  was no evidence for stenosis  There was moderate regurgitation  AORTIC VALVE: The valve was trileaflet  Leaflets exhibited normal thickness,  normal cuspal separation, and sclerosis  DOPPLER: Transaortic velocity was  within the normal range  There was no evidence for stenosis  There was no  regurgitation  TRICUSPID VALVE: The valve structure was normal  There was normal leaflet  separation  DOPPLER: The transtricuspid velocity was within the normal range  There was mild regurgitation  Pulmonary artery systolic pressure was mildly  increased  Estimated peak PA pressure was 46 mmHg      PULMONIC VALVE: "Leaflets exhibited normal thickness, no calcification, and  normal cuspal separation  DOPPLER: The transpulmonic velocity was within the  normal range  There was mild regurgitation  PERICARDIUM: There was no thickening  There was no pericardial effusion  AORTA: The root exhibited normal size  PULMONARY ARTERY: The size was normal  The morphology appeared normal     SYSTEM MEASUREMENT TABLES    2D mode  AoR Diam 2D: 2 8 cm  LA Diam (2D): 5 3 cm  LA/Ao (2D): 1 89  FS (2D Teich): 25 3 %  IVSd (2D): 1 44 cm  LVDEV: 98 3 cm³  LVESV: 49 1 cm³  LVIDd(2D): 4 62 cm  LVISd (2D): 3 45 cm  LVPWd (2D): 1 3 cm  SV (Teich): 49 2 cm³    Apical four chamber  LVEF A4C: 55 %    Unspecified Scan Mode  MV Peak A Jesse: 1110 mm/s  MV Peak E Jesse  Mean: 1400 mm/s  MVA (PHT): 3 55 cm squared  PHT: 58 ms  Max P mm[Hg]  V Max: 2990 mm/s  Vmax: 3050 mm/s  RA Area: 19 6 cm squared  RA Volume: 55 3 cm³  TAPSE: 1 9 cm    [employer] Accredited Echocardiography Laboratory    Prepared and electronically signed by    Mark Guardado DO  Signed 2016 17:37:47       EKG atrial fibrillation 100 beats per minute  afib 81bmp rbbb nonspecific t-wave changes        Pama Cap MD Alaniz  Please call with any questions or suggestions    A description of the counselin minutes spent with family discussing about risk for long-term anticoagulation versus benefits  Goals and Barriers:  Patient's ability to self care:  Medication side effect reviewed with patient in detail and all their questions answered  \"This note has been constructed using a voice recognition system  Therefore there may be syntax, spelling, and/or grammatical errors  Please call if you have any questions   \"    "

## 2023-05-06 DIAGNOSIS — Z79.01 CURRENT USE OF LONG TERM ANTICOAGULATION: ICD-10-CM

## 2023-05-06 DIAGNOSIS — I49.1 PAC (PREMATURE ATRIAL CONTRACTION): ICD-10-CM

## 2023-05-06 DIAGNOSIS — I50.33 ACUTE ON CHRONIC DIASTOLIC CONGESTIVE HEART FAILURE (HCC): ICD-10-CM

## 2023-05-06 DIAGNOSIS — I10 BENIGN ESSENTIAL HYPERTENSION: ICD-10-CM

## 2023-05-06 DIAGNOSIS — I34.0 MODERATE TO SEVERE MITRAL REGURGITATION: ICD-10-CM

## 2023-05-06 DIAGNOSIS — I10 BENIGN ESSENTIAL HTN: ICD-10-CM

## 2023-05-06 DIAGNOSIS — I48.0 PAROXYSMAL ATRIAL FIBRILLATION (HCC): ICD-10-CM

## 2023-05-08 RX ORDER — AMLODIPINE BESYLATE 10 MG/1
5 TABLET ORAL DAILY
Qty: 90 TABLET | Refills: 0 | Status: SHIPPED | OUTPATIENT
Start: 2023-05-08

## 2023-05-09 RX ORDER — BUMETANIDE 2 MG/1
2 TABLET ORAL DAILY
Qty: 90 TABLET | Refills: 1 | Status: SHIPPED | OUTPATIENT
Start: 2023-05-09

## 2023-05-09 RX ORDER — SPIRONOLACTONE 25 MG/1
12.5 TABLET ORAL DAILY
Qty: 45 TABLET | Refills: 1 | Status: SHIPPED | OUTPATIENT
Start: 2023-05-09

## 2023-05-18 ENCOUNTER — TELEPHONE (OUTPATIENT)
Dept: PAIN MEDICINE | Facility: MEDICAL CENTER | Age: 83
End: 2023-05-18

## 2023-05-18 NOTE — TELEPHONE ENCOUNTER
Pls call pt and schedule her an ovs w/ Kathie Leventhal to be re-eval for her pain  He doesn't want an inj scheduled until she is re-evaluated

## 2023-05-18 NOTE — TELEPHONE ENCOUNTER
Pt asking for an injection  She said her pain is a little different than before  Her pain starts in the left shoulder and goes down to the center of her lower back and down both legs to the feet  Pt is on Eliquis prescribed by Dr Louisa Dominguez  Last inj was 9/29/22 Rt L4-L5 TFESI  Is ovs needed to be re-eval or can inj be scheduled?

## 2023-05-18 NOTE — TELEPHONE ENCOUNTER
Caller: patient    Doctor: Yandel Russell    Reason for call: Schedule a procedure   She is currently taking Eliquis    Call back#:

## 2023-05-24 ENCOUNTER — OFFICE VISIT (OUTPATIENT)
Dept: PAIN MEDICINE | Facility: CLINIC | Age: 83
End: 2023-05-24

## 2023-05-24 VITALS
DIASTOLIC BLOOD PRESSURE: 64 MMHG | SYSTOLIC BLOOD PRESSURE: 125 MMHG | RESPIRATION RATE: 16 BRPM | WEIGHT: 158 LBS | HEART RATE: 83 BPM | BODY MASS INDEX: 29.83 KG/M2 | HEIGHT: 61 IN

## 2023-05-24 DIAGNOSIS — M54.2 NECK PAIN: ICD-10-CM

## 2023-05-24 DIAGNOSIS — M54.12 CERVICAL RADICULOPATHY: ICD-10-CM

## 2023-05-24 DIAGNOSIS — G89.4 CHRONIC PAIN SYNDROME: Primary | ICD-10-CM

## 2023-05-24 NOTE — PROGRESS NOTES
Assessment:  1  Chronic pain syndrome    2  Cervical radiculopathy    3  Neck pain        Plan:  The patient is an 65-year-old female with a history of chronic pain secondary to low back pain, lumbar spondylosis, lumbar stenosis and lumbar spondylolisthesis who presents to the office with a new pain complaint of left-sided neck pain and shooting pain and decreased strength into the left upper extremity  At this time, I will order an x-ray and CT scan of her cervical spine to evaluate for any pathology that may be causing her worsening left-sided neck pain  I did advise patient she is likely a candidate for a cervical epidural steroid injection, however will need CT scan of her cervical spine before moving forward  I instructed patient I will call once I receive the results of the x-ray and CT scan  Patient can continue taking Tylenol as needed for pain, up to 3000 mg/day  My impressions and treatment recommendations were discussed in detail with the patient who verbalized understanding and had no further questions  Discharge instructions were provided  I personally saw and examined the patient and I agree with the above discussed plan of care  Orders Placed This Encounter   Procedures   • XR spine cervical complete 4 or 5 vw non injury     Standing Status:   Future     Standing Expiration Date:   5/24/2027     Scheduling Instructions:      Bring along any outside films relating to this procedure  • CT cervical spine without contrast     Standing Status:   Future     Standing Expiration Date:   5/24/2027     Scheduling Instructions: There is no prep for this study  Please bring your insurance cards, a form of photo ID and a list of your medications with you  Arrive 15 minutes prior to your appointment time to register  On the day of your test, please bring any prior CT or MRI studies of this area with you that were not performed at a [facility]              To schedule this appointment, please contact Central Scheduling at [phone]  Order Specific Question:   What is the patient's sedation requirement? Answer:   No Sedation     Order Specific Question:   Release to patient through Mychart     Answer:   Immediate     Order Specific Question:   Reason for Exam (FREE TEXT)     Answer:   Cervical radiculopathy     No orders of the defined types were placed in this encounter  History of Present Illness:  Sam Burton is a 79 y o  female with a history of chronic pain secondary to low back pain, lumbar spondylosis, lumbar stenosis and lumbar spondylolisthesis  She was last seen on 9/29/2022 where she underwent a right L4-L5 TFESI which provided her greater than 50% relief of her pain  She presents to the office with a new pain complaint of left-sided neck pain and shooting pain that radiates into the left upper extremity as well as decreased strength in her left upper extremity  She states her pain is constant but worse at night  She rates quality of her pain as dull/aching and is currently rating her pain an 8/10 on a numeric scale  Current pain medications include Tylenol which is providing mild relief  I have personally reviewed and/or updated the patient's past medical history, past surgical history, family history, social history, current medications, allergies, and vital signs today  Review of Systems   Respiratory: Positive for shortness of breath  Cardiovascular: Negative for chest pain  Gastrointestinal: Positive for diarrhea  Negative for constipation, nausea and vomiting  Musculoskeletal: Positive for back pain, gait problem and joint swelling  Negative for arthralgias and myalgias  Left Shoulder & LLE Pain   Skin: Negative for rash  Neurological: Positive for dizziness and weakness  Negative for seizures  Psychiatric/Behavioral: Positive for decreased concentration     All other systems reviewed and are negative  Patient Active Problem List   Diagnosis   • Benign essential hypertension   • Chronic diastolic (congestive) heart failure (HCC)   • Hypothyroidism   • Arthropathy of knee   • Hallux abductovalgus with bunions, unspecified laterality   • CKD (chronic kidney disease), stage III ([facility] [address] )   • Diverticulitis of colon   • Chronic gout without tophus   • Hiatal hernia   • Hyperlipemia   • Hyperuricemia   • Nephrolithiasis   • WENDI (obstructive sleep apnea)   • Pseudogout of left wrist   • Gastroesophageal reflux disease without esophagitis   • Exertional shortness of breath   • Prediabetes   • Status post right frontotemporal replacement cranioplasty   • BMI 38 0-38 9,adult   • Sciatica of left side   • Spinal stenosis of lumbar region with neurogenic claudication       Past Medical History:   Diagnosis Date   • Anxiety    • Arthritis     joints   • Cataract    • Disease of thyroid gland    • Eczema    • GERD (gastroesophageal reflux disease)    • Gout    • Heart failure (HCC)    • Hepatitis    • Hiatal hernia    • Hypertension    • Onychomycosis     last assessed: 16   • Orthopnea     resolved: 16   • Pseudogout of left wrist    • Sleep apnea     wears CPAP   • Stroke [facility])        Past Surgical History:   Procedure Laterality Date   • ABDOMINAL SURGERY         • BRAIN HEMATOMA EVACUATION Right 2020    Procedure: Right decompressive craniectomy and evacuation of intraparenchymal hematoma; Surgeon: Alexsandra Falcon MD;  Location:  MAIN OR;  Service: Neurosurgery   • BREAST SURGERY Right     cyst removal   • CARPAL TUNNEL RELEASE Left    • CARPAL TUNNEL RELEASE Right    • CATARACT EXTRACTION     •  SECTION  1960    x1   • COLONOSCOPY N/A 2018    Procedure: COLONOSCOPY;  Surgeon: Meagan Palomo MD;  Location: [place] [address] GI LAB;   Service: Gastroenterology   • DILATION AND CURETTAGE OF UTERUS     • EYE SURGERY Left     cataracts   • HERNIA REPAIR      umbilical hernia • INCISIONAL HERNIA REPAIR      incarcerated   • KNEE ARTHROSCOPY Right    • MD RPLCMT BONE FLAP/PROSTHETIC PLATE SKULL Right 5/2/6181    Procedure: right frontotemporal replacement cranioplasty;  Surgeon: Faustino Gonsalves MD;  Location:  MAIN OR;  Service: Neurosurgery   • MD XCAPSL CTRC RMVL INSJ IO LENS PROSTH W/O ECP Right 3/27/2017    Procedure: EXTRACTION EXTRACAPSULAR CATARACT PHACO INTRAOCULAR LENS (IOL); Surgeon: Priscilla Acosta MD;  Location: [facility] MAIN OR;  Service: Ophthalmology       Family History   Problem Relation Age of Onset   • Diabetes Mother    • Coronary artery disease Mother    • Arthritis Mother    • Hypertension Mother    • Hypertension Father    • Diabetes Brother    • Diabetes Son    • Arthritis Family        Social History     Occupational History   • Not on file   Tobacco Use   • Smoking status: Never   • Smokeless tobacco: Never   Vaping Use   • Vaping Use: Never used   Substance and Sexual Activity   • Alcohol use: Yes     Alcohol/week: 0 0 standard drinks of alcohol     Comment: Rare     • Drug use: Never   • Sexual activity: Not Currently     Birth control/protection: Post-menopausal       Current Outpatient Medications on File Prior to Visit   Medication Sig   • amLODIPine (NORVASC) 10 mg tablet Take 0 5 tablets (5 mg total) by mouth daily   • apixaban (Eliquis) 2 5 mg Take 1 tablet (2 5 mg total) by mouth 2 (two) times a day   • atorvastatin (LIPITOR) 40 mg tablet Take 1 tablet (40 mg total) by mouth every evening   • bumetanide (BUMEX) 2 mg tablet Take 1 tablet (2 mg total) by mouth daily   • levothyroxine 88 mcg tablet Take 1 tablet (88 mcg total) by mouth daily   • losartan (COZAAR) 25 mg tablet TAKE 2 TABLETS IN THE MORNING AND TAKE 1 TABLET IN THE EVENING   • metoprolol tartrate (LOPRESSOR) 25 mg tablet Take 1 tablet (25 mg total) by mouth every 12 (twelve) hours   • pantoprazole (PROTONIX) 20 mg tablet Take 1 tablet (20 mg total) by mouth daily   • spironolactone "(ALDACTONE) 25 mg tablet Take 0 5 tablets (12 5 mg total) by mouth daily   • ketoconazole (NIZORAL) 2 % cream Apply topically daily     No current facility-administered medications on file prior to visit  No Known Allergies    Physical Exam:    /64   Pulse 83   Resp 16   Ht 5' 1\" (1 549 m)   Wt 71 7 kg (158 lb)   BMI 29 85 kg/m²     Constitutional:normal, well developed, well nourished, alert, in no distress and non-toxic and no overt pain behavior    Eyes:anicteric  HEENT:grossly intact  Neck:supple, symmetric, trachea midline and no masses   Pulmonary:even and unlabored  Cardiovascular:No edema or pitting edema present  Skin:Normal without rashes or lesions and well hydrated  Psychiatric:Mood and affect appropriate  Neurologic:Cranial Nerves II-XII grossly intact  Musculoskeletal:antalgic and ambulates with cane     Cervical Spine Exam    Appearance:  Normal lordosis  Palpation/Tenderness:  left cervical paraspinal tenderness  left trapezium tenderness  Sensory:  no sensory deficits noted  Range of Motion:  Flexion:  Minimally limited  with pain  Extension:  Minimally limited  with pain  Rotation - Left:  Minimally limited  with pain  Rotation - Right:  Minimally limited  with pain  Motor Strength:  Left Arm Flexion  5/5  Left Arm Extension  5/5  Right Arm Flexion  5/5  Right Arm Extension  5/5  Left    5/5  Right   5/5      Imaging    "

## 2023-05-26 ENCOUNTER — OFFICE VISIT (OUTPATIENT)
Dept: PODIATRY | Facility: CLINIC | Age: 83
End: 2023-05-26

## 2023-05-26 ENCOUNTER — APPOINTMENT (OUTPATIENT)
Dept: RADIOLOGY | Facility: CLINIC | Age: 83
End: 2023-05-26

## 2023-05-26 VITALS
BODY MASS INDEX: 29.83 KG/M2 | HEIGHT: 61 IN | WEIGHT: 158 LBS | RESPIRATION RATE: 16 BRPM | DIASTOLIC BLOOD PRESSURE: 64 MMHG | SYSTOLIC BLOOD PRESSURE: 125 MMHG

## 2023-05-26 DIAGNOSIS — M79.671 RIGHT FOOT PAIN: ICD-10-CM

## 2023-05-26 DIAGNOSIS — I70.209 PERIPHERAL ARTERIOSCLEROSIS (HCC): ICD-10-CM

## 2023-05-26 DIAGNOSIS — M79.672 LEFT FOOT PAIN: ICD-10-CM

## 2023-05-26 DIAGNOSIS — M20.12 HALLUX VALGUS OF LEFT FOOT: ICD-10-CM

## 2023-05-26 DIAGNOSIS — M20.11 HALLUX VALGUS OF RIGHT FOOT: Primary | ICD-10-CM

## 2023-05-26 DIAGNOSIS — B35.1 ONYCHOMYCOSIS: ICD-10-CM

## 2023-05-26 DIAGNOSIS — M54.2 NECK PAIN: ICD-10-CM

## 2023-05-26 DIAGNOSIS — B35.9 DERMATOPHYTOSIS: ICD-10-CM

## 2023-05-26 NOTE — PROGRESS NOTES
Assessment/Plan: Pain upon ambulation  Foot pain bilateral   Inflamed hallux valgus deformity bilateral   Mycosis of nail and skin      Plan  Chart reviewed  Patient examined  Patient advised on condition  Today bilateral arthrocentesis done  1 cc Kenalog 10 injected into each first MPJ without pain or complication  All nails debrided  Patient will be started on topical antifungal             Diagnoses and all orders for this visit:     Hallux valgus of right foot     Hallux valgus of left foot     Right foot pain     Left foot pain     Dermatophytosis  -     ketoconazole (NIZORAL) 2 % cream; Apply topically daily     Onychomycosis  -     ketoconazole (NIZORAL) 2 % cream; Apply topically daily            Subjective: Patient has pain in her bunions  She is requesting injection therapy    She also has dry itchy scaly skin on her feet      No Known Allergies        Current Outpatient Medications:   •  apixaban (Eliquis) 2 5 mg, Take 1 tablet (2 5 mg total) by mouth 2 (two) times a day, Disp: 180 tablet, Rfl: 1  •  ketoconazole (NIZORAL) 2 % cream, Apply topically daily, Disp: 60 g, Rfl: 1  •  amLODIPine (NORVASC) 10 mg tablet, Take 0 5 tablets (5 mg total) by mouth daily, Disp: 90 tablet, Rfl: 0  •  atorvastatin (LIPITOR) 40 mg tablet, Take 1 tablet (40 mg total) by mouth every evening, Disp: 90 tablet, Rfl: 1  •  bumetanide (BUMEX) 2 mg tablet, Take 1 tablet (2 mg total) by mouth daily, Disp: 90 tablet, Rfl: 3  •  levothyroxine 88 mcg tablet, Take 1 tablet (88 mcg total) by mouth daily, Disp: 90 tablet, Rfl: 0  •  losartan (COZAAR) 25 mg tablet, TAKE 2 TABLETS IN THE MORNING AND TAKE 1 TABLET IN THE EVENING, Disp: 270 tablet, Rfl: 3  •  metoprolol tartrate (LOPRESSOR) 25 mg tablet, Take 1 tablet (25 mg total) by mouth every 12 (twelve) hours, Disp: 180 tablet, Rfl: 0  •  pantoprazole (PROTONIX) 20 mg tablet, Take 1 tablet (20 mg total) by mouth daily, Disp: 90 tablet, Rfl: 0  •  spironolactone (ALDACTONE) 25 mg tablet, Take 0 5 tablets (12 5 mg total) by mouth daily, Disp: 45 tablet, Rfl: 3         Patient Active Problem List   Diagnosis   • Benign essential hypertension   • Chronic diastolic (congestive) heart failure (HCC)   • Hypothyroidism   • Arthropathy of knee   • Hallux abductovalgus with bunions, unspecified laterality   • CKD (chronic kidney disease), stage III (HCC)   • Diverticulitis of colon   • Chronic gout without tophus   • Hiatal hernia   • Hyperlipemia   • Hyperuricemia   • Nephrolithiasis   • WENDI (obstructive sleep apnea)   • Pseudogout of left wrist   • Gastroesophageal reflux disease without esophagitis   • Exertional shortness of breath   • Prediabetes   • Status post right frontotemporal replacement cranioplasty   • BMI 38 0-38 9,adult   • Sciatica of left side   • Spinal stenosis of lumbar region with neurogenic claudication             Patient ID: Geeta Benavides is a 79 y o  female      HPI     The following portions of the patient's history were reviewed and updated as appropriate:      family history includes Arthritis in her family and mother; Coronary artery disease in her mother; Diabetes in her brother, mother, and son; Hypertension in her father and mother        reports that she has never smoked  She has never used smokeless tobacco  She reports current alcohol use  She reports that she does not use drugs       Objective:  Patient's shoes and socks removed  Foot ExamPhysical Exam       Physical Exam   Left Foot: Appearance: Normal except as noted: excessive pronation-- and-- pes planus     Right Foot: Appearance: Normal except as noted: excessive pronation-- and-- pes planus  Tenderness: None except the calcaneous,-- medial calcaneous-- and-- insertion of the plantar fascia   Patient has an enlarged hallux valgus deformity with inflamed bunion  Left Ankle: Appearance: Normal except ecchymosis-- and-- swelling laterally   ROM: limited ROM in all planes    Right Ankle: ROM: limited ROM in all planes Motor: diffuse weakness   Pain with palpation anterior lateral aspect right ankle joint mortise     Neurological Exam: performed  Light touch was intact bilaterally  Vibratory sensation was intact bilaterally  Response to monofilament test was intact bilaterally  Deep tendon reflexes: patellar reflex present bilaterally-- and-- achilles reflex present bilaterally     Vascular Exam: performed Dorsalis pedis pulses were 1/4 bilaterally  Posterior tibial pulses were 1/4 bilaterally  Elevation Pallor: diminished bilaterally  Capillary refill time was greater than 3 seconds bilaterally-- and-- Q9 findings bilateral  Negative digital hair noted  Positive abnormal cooling bilateral  Edema: moderate bilaterally-- and-- 6/7 pitting edema  Negative Homans sign     Toenails: All of the toenails were elongated,-- hypertrophied,-- discolored-- and-- Mycotic with onychogryphosis  Note is made of bilateral tinea pedis in moccasin foot  Distribution          Socks and shoes removed, Right Foot Findings: swollen, erythematous and dry       The sensory exam showed diminished vibratory sensation at the level of the toes  Diminished tactile sensation with monofilament testing throughout the right foot       Socks and shoes removed, Left Foot Findings: swollen, erythematous and dry       The sensory exam showed diminished vibratory sensation at the level of the toes  Diminished tactile sensation with monofilament testing throughout the left foot      Capillary refills findings on the right were delayed in the toes       Pulses:      1+ in the posterior tibialis on the right      1+ in the dorsalis pedis on the right       Capillary refills findings on the left were delayed in the toes        Pulses:      1+ in the posterior tibialis on the left      1+ in the dorsalis pedis on the left       Assign Risk Category: 2: Loss of protective sensation with or without weakness, deformity, callus, pre-ulcer, or history of ulceration  High risk     Hyperkeratosis: present on both first toes,-- present on both first sub metatarsals-- and-- Bilateral plantar moccasin tinea pedis noted     Shoe Gear Evaluation: performed ()   Recommendation(s): SAS style

## 2023-05-31 ENCOUNTER — HOSPITAL ENCOUNTER (EMERGENCY)
Facility: HOSPITAL | Age: 83
Discharge: HOME | End: 2023-05-31
Attending: EMERGENCY MEDICINE | Admitting: EMERGENCY MEDICINE
Payer: MEDICARE

## 2023-05-31 ENCOUNTER — TELEPHONE (OUTPATIENT)
Dept: PAIN MEDICINE | Facility: CLINIC | Age: 83
End: 2023-05-31

## 2023-05-31 ENCOUNTER — APPOINTMENT (EMERGENCY)
Dept: CT IMAGING | Facility: HOSPITAL | Age: 83
End: 2023-05-31
Payer: MEDICARE

## 2023-05-31 VITALS
TEMPERATURE: 97.6 F | OXYGEN SATURATION: 99 % | HEART RATE: 84 BPM | DIASTOLIC BLOOD PRESSURE: 91 MMHG | RESPIRATION RATE: 17 BRPM | SYSTOLIC BLOOD PRESSURE: 117 MMHG

## 2023-05-31 DIAGNOSIS — M54.2 CHRONIC NECK PAIN: Primary | ICD-10-CM

## 2023-05-31 DIAGNOSIS — G89.29 CHRONIC NECK PAIN: Primary | ICD-10-CM

## 2023-05-31 PROCEDURE — G1004 CDSM NDSC: HCPCS

## 2023-05-31 PROCEDURE — 99283 EMERGENCY DEPT VISIT LOW MDM: CPT

## 2023-05-31 PROCEDURE — 72125 CT NECK SPINE W/O DYE: CPT

## 2023-05-31 RX ORDER — AMOXICILLIN 250 MG
1 CAPSULE ORAL DAILY
Qty: 20 TABLET | Refills: 0 | Status: SHIPPED | OUTPATIENT
Start: 2023-05-31

## 2023-05-31 RX ORDER — OXYCODONE HYDROCHLORIDE AND ACETAMINOPHEN 5; 325 MG/1; MG/1
1 TABLET ORAL ONCE
Status: COMPLETED | OUTPATIENT
Start: 2023-05-31 | End: 2023-05-31

## 2023-05-31 RX ORDER — OXYCODONE HYDROCHLORIDE AND ACETAMINOPHEN 5; 325 MG/1; MG/1
1 TABLET ORAL EVERY 6 HOURS PRN
Qty: 12 TABLET | Refills: 0 | Status: SHIPPED | OUTPATIENT
Start: 2023-05-31 | End: 2023-06-10

## 2023-05-31 RX ADMIN — OXYCODONE HYDROCHLORIDE AND ACETAMINOPHEN 1 TABLET: 5; 325 TABLET ORAL at 14:27

## 2023-05-31 NOTE — TELEPHONE ENCOUNTER
----- Message from [facility] sent at 5/31/2023  9:58 AM EDT -----  Please call patient with x-ray of cervical spine showing multilevel degenerative changes  The CT scan of her cervical spine will show me more information about what is going on in her cervical spine, I see she is scheduled for 6/6/2023    I will call her once I receive those results

## 2023-05-31 NOTE — TELEPHONE ENCOUNTER
"S/w pt who states that she is having the same pain that she had at [facility] on 5/26 but now #10/10  Pt is taking \"a lot\" of tylenol  Pt advised 1000 mg q 8 hrs with no more that 3000 mg/24hrs  Pt is using heat and voltaren cream that does not help  Pt on eliquis, no nsaids  Pt is requesting xray results and next steps to help her pain  Advised will cb with any further recs    "

## 2023-05-31 NOTE — TELEPHONE ENCOUNTER
Caller: Lovely Oconnell (pt's daughter-in-law)    Doctor: Becca Alicea, [facility]    Reason for call:Kateryna transferred to nurse    Call back#: n/a

## 2023-05-31 NOTE — TELEPHONE ENCOUNTER
S/w pt and advised of same  Pt crying and stated that she is disgusted with life, that she cannot sleep and cannot be in this amount of pain  Pt advised to use tylenol, ice and or heat, diclofenac cream and salon pas  Advised to contact central scheduling to get sooner appt for CT scan and if the pain is that severe to go to ER  S/w LUDWIG Cleary and reviewed same  She will call to try to reschedule CT scan and is aware if procedure indicated, pt may have to hold eliquis  PT offered, she will discuss with pt and cb if interested

## 2023-05-31 NOTE — ED PROVIDER NOTES
History  Chief Complaint   Patient presents with   • Neck Pain     Pt c/o neck pain, new dx of DJD  Pt states pain has worsened over past few days, unable to sleep  Pt taking tylenol at home, not managing pain  78 yo female CHF, HTN, WENDI, CVA, anxiety p/w acute on chronic neck pain to left neck  No h/o trauma or obvious inciting event, has been followed by Dr Tasha Weston for such and recent imaging shows DJD  Reports shooting pain down left arm and intermittent tingling  No color change of limb or motor weakness of hand  No f/c/n/v/d/changes in bowel or bladder  Pt has CT scan of neck ordered but not yet done  History provided by:  Medical records, patient and relative   used: No    Neck Pain  Pain location:  L side  Quality:  Aching and shooting  Pain radiates to:  L shoulder and L arm  Pain severity:  Severe  Pain is: Worse during the night  Onset quality:  Gradual  Duration:  1 month  Timing:  Constant  Progression:  Waxing and waning  Chronicity:  Chronic  Context: not fall, not jumping from heights, not lifting a heavy object, not MCA, not MVC, not pedestrian accident and not recent injury    Relieved by:  Nothing  Worsened by:  Position and twisting  Ineffective treatments:  Analgesics  Associated symptoms: numbness (intermittent numbness of LUE)    Associated symptoms: no chest pain, no fever and no weakness    Risk factors: no hx of head and neck radiation, no hx of osteoporosis, no hx of spinal trauma, no recent epidural, no recent head injury and no recurrent falls        Prior to Admission Medications   Prescriptions Last Dose Informant Patient Reported? Taking?    amLODIPine (NORVASC) 10 mg tablet   No No   Sig: Take 0 5 tablets (5 mg total) by mouth daily   apixaban (Eliquis) 2 5 mg   No No   Sig: Take 1 tablet (2 5 mg total) by mouth 2 (two) times a day   atorvastatin (LIPITOR) 40 mg tablet  Self No No   Sig: Take 1 tablet (40 mg total) by mouth every evening   bumetanide (BUMEX) 2 mg tablet   No No   Sig: Take 1 tablet (2 mg total) by mouth daily   ketoconazole (NIZORAL) 2 % cream   No No   Sig: Apply topically daily   levothyroxine 88 mcg tablet  Self No No   Sig: Take 1 tablet (88 mcg total) by mouth daily   losartan (COZAAR) 25 mg tablet  Self No No   Sig: TAKE 2 TABLETS IN THE MORNING AND TAKE 1 TABLET IN THE EVENING   metoprolol tartrate (LOPRESSOR) 25 mg tablet   No No   Sig: Take 1 tablet (25 mg total) by mouth every 12 (twelve) hours   pantoprazole (PROTONIX) 20 mg tablet  Self No No   Sig: Take 1 tablet (20 mg total) by mouth daily   spironolactone (ALDACTONE) 25 mg tablet   No No   Sig: Take 0 5 tablets (12 5 mg total) by mouth daily      Facility-Administered Medications: None       Past Medical History:   Diagnosis Date   • Anxiety    • Arthritis     joints   • Cataract    • Disease of thyroid gland    • Eczema    • GERD (gastroesophageal reflux disease)    • Gout    • Heart failure (HCC)    • Hepatitis    • Hiatal hernia    • Hypertension    • Onychomycosis     last assessed: 16   • Orthopnea     resolved: 16   • Pseudogout of left wrist    • Sleep apnea     wears CPAP   • Stroke [facility])        Past Surgical History:   Procedure Laterality Date   • ABDOMINAL SURGERY         • BRAIN HEMATOMA EVACUATION Right 2020    Procedure: Right decompressive craniectomy and evacuation of intraparenchymal hematoma; Surgeon: Eleazar Schmidt MD;  Location:  MAIN OR;  Service: Neurosurgery   • BREAST SURGERY Right     cyst removal   • CARPAL TUNNEL RELEASE Left    • CARPAL TUNNEL RELEASE Right    • CATARACT EXTRACTION     •  SECTION  1960    x1   • COLONOSCOPY N/A 2018    Procedure: COLONOSCOPY;  Surgeon: Iwona Wong MD;  Location: [facility] GI LAB;   Service: Gastroenterology   • DILATION AND CURETTAGE OF UTERUS     • EYE SURGERY Left     cataracts   • HERNIA REPAIR      umbilical hernia   • INCISIONAL HERNIA REPAIR      incarcerated   • KNEE ARTHROSCOPY Right    • NJ RPLCMT BONE FLAP/PROSTHETIC PLATE SKULL Right 5/3/5599    Procedure: right frontotemporal replacement cranioplasty;  Surgeon: Nayla Mojica MD;  Location:  MAIN OR;  Service: Neurosurgery   • NJ XCAPSL CTRC RMVL INSJ IO LENS PROSTH W/O ECP Right 3/27/2017    Procedure: EXTRACTION EXTRACAPSULAR CATARACT PHACO INTRAOCULAR LENS (IOL); Surgeon: Patrick Griffiths MD;  Location: [facility] MAIN OR;  Service: Ophthalmology       Family History   Problem Relation Age of Onset   • Diabetes Mother    • Coronary artery disease Mother    • Arthritis Mother    • Hypertension Mother    • Hypertension Father    • Diabetes Brother    • Diabetes Son    • Arthritis Family      I have reviewed and agree with the history as documented  E-Cigarette/Vaping   • E-Cigarette Use Never User      E-Cigarette/Vaping Substances   • Nicotine No    • THC No    • CBD No    • Flavoring No    • Other No    • Unknown No      Social History     Tobacco Use   • Smoking status: Never   • Smokeless tobacco: Never   Vaping Use   • Vaping Use: Never used   Substance Use Topics   • Alcohol use: Yes     Alcohol/week: 0 0 standard drinks of alcohol     Comment: Rare  • Drug use: Never       Review of Systems   Constitutional: Negative for chills and fever  HENT: Negative for congestion, rhinorrhea and sore throat  Eyes: Negative for visual disturbance  Respiratory: Negative for shortness of breath  Cardiovascular: Negative for chest pain  Gastrointestinal: Negative for diarrhea, nausea and vomiting  Genitourinary: Negative for dysuria, frequency and urgency  Musculoskeletal: Positive for neck pain  Skin: Negative for rash  Neurological: Positive for numbness (intermittent numbness of LUE)  Negative for facial asymmetry and weakness  All other systems reviewed and are negative  Physical Exam  Physical Exam  Vitals and nursing note reviewed     Constitutional: General: She is not in acute distress  Appearance: Normal appearance  She is well-developed  She is not ill-appearing, toxic-appearing or diaphoretic  HENT:      Head: Normocephalic and atraumatic  Nose: Nose normal  No congestion or rhinorrhea  Mouth/Throat:      Mouth: Mucous membranes are moist    Eyes:      General: Lids are normal  No scleral icterus  Conjunctiva/sclera: Conjunctivae normal    Neck:      Trachea: Trachea and phonation normal    Cardiovascular:      Rate and Rhythm: Normal rate and regular rhythm  Pulses: Normal pulses  Pulmonary:      Effort: Pulmonary effort is normal  No respiratory distress  Musculoskeletal:         General: No deformity  Normal range of motion  Cervical back: Normal range of motion and neck supple  No edema, erythema, rigidity or crepitus  Pain with movement and muscular tenderness (left lateral neck) present  No spinous process tenderness  Normal range of motion  Lymphadenopathy:      Cervical: No cervical adenopathy  Skin:     General: Skin is warm and dry  Capillary Refill: Capillary refill takes less than 2 seconds  Neurological:      General: No focal deficit present  Mental Status: She is alert and oriented to person, place, and time  Mental status is at baseline  Cranial Nerves: No cranial nerve deficit  Sensory: No sensory deficit  Motor: No weakness  Psychiatric:         Behavior: Behavior normal          Thought Content:  Thought content normal          Judgment: Judgment normal          Vital Signs  ED Triage Vitals [05/31/23 1215]   Temperature Pulse Respirations Blood Pressure SpO2   97 6 °F (36 4 °C) 84 17 117/91 99 %      Temp Source Heart Rate Source Patient Position - Orthostatic VS BP Location FiO2 (%)   Oral Monitor Lying Right arm --      Pain Score       10 - Worst Possible Pain           Vitals:    05/31/23 1215   BP: 117/91   Pulse: 84   Patient Position - Orthostatic VS: Lying Visual Acuity      ED Medications  Medications   oxyCODONE-acetaminophen (PERCOCET) 5-325 mg per tablet 1 tablet (has no administration in time range)       Diagnostic Studies  Results Reviewed     None                 CT cervical spine without contrast    (Results Pending)              Procedures  Procedures         ED Course  ED Course as of 06/03/23 1148   Wed May 31, 2023   1312 CT 5/26 - IMPRESSION:     No acute osseous abnormality      Degenerative changes as above      Narrowed neural foramina as noted above         Workstation performed: [id number]   6432 CT cervical spine without contrast   1602 Improved pain, ambulated steadily  Feels safe going home  Will f/u with Dr Truman Mohamud  Medical Decision Making  Pt neuro intact, non-meningitic, full ROM neck/LUE  Imaging without acute findings  Improved pain with ED treatment  Safe for dispo to home with close f/u with pain management and comprehensive spine  RTER precautions discussed and documented on discharge paperwork, pt and family endorsed good understanding of reasons to return  Amount and/or Complexity of Data Reviewed  Independent Historian: caregiver     Details: daughter  External Data Reviewed: radiology and notes  Radiology: ordered  Decision-making details documented in ED Course  Risk  OTC drugs  Prescription drug management  Decision regarding hospitalization  Disposition  Final diagnoses:   None     ED Disposition     None      Follow-up Information    None         Patient's Medications   Discharge Prescriptions    No medications on file       No discharge procedures on file      PDMP Review       Value Time User    PDMP Reviewed  Yes 3/24/2021  2:21 PM Lesley Wells MD          ED Provider  Electronically Signed by           Shweta Owens MD  06/03/23 9741

## 2023-05-31 NOTE — TELEPHONE ENCOUNTER
S/w Randee Ribeiro who was able to move CT scan up to 6/1/23  Pt is still in severe pain  She will take pt to ER  Aware they will be contacted with results as soon as possible

## 2023-05-31 NOTE — TELEPHONE ENCOUNTER
Caller: patient  Doctor: Edita Lopez    Reason for call: pt in a lot of pain in neck and arm and shoulder   Pain level is a 10/10    Call back#: [phone]

## 2023-06-01 ENCOUNTER — TELEPHONE (OUTPATIENT)
Dept: PAIN MEDICINE | Facility: CLINIC | Age: 83
End: 2023-06-01

## 2023-06-01 ENCOUNTER — TELEPHONE (OUTPATIENT)
Dept: PHYSICAL THERAPY | Facility: OTHER | Age: 83
End: 2023-06-01

## 2023-06-01 ENCOUNTER — VBI (OUTPATIENT)
Dept: ADMINISTRATIVE | Facility: OTHER | Age: 83
End: 2023-06-01

## 2023-06-01 DIAGNOSIS — M54.12 CERVICAL RADICULOPATHY: Primary | ICD-10-CM

## 2023-06-01 DIAGNOSIS — M51.16 INTERVERTEBRAL DISC DISORDER WITH RADICULOPATHY OF LUMBAR REGION: Primary | ICD-10-CM

## 2023-06-01 NOTE — TELEPHONE ENCOUNTER
This patient is being considered for a neuraxial injection for the management of their pain  However, the patient is currently on an anticoagulant per your orders  The American Society [employer]est[employer] Guideline on neuraxial procedures and anti-coagulation indicates that medication should be held as follows: Eliquis 3 days prior to injection  Please advise if you ok the hold of this medication      Thank you,    Nicolas Oliveira  Procedure   [facility]

## 2023-06-01 NOTE — TELEPHONE ENCOUNTER
----- Message from [facility] sent at 6/1/2023 10:12 AM EDT -----  Regarding: Patient call back  Patient had a ref to comp spine, from her ED visit 5/31/23  I just spoke to her, and she is in severe pain  Patient is already established with PM, Dr Maye Henderson  Per her chart it does appear she has a call into PM for a nurse to call her back? Please reach out to the patient when available to discuss her pain  She declined triage for PT until she speaks to a nurse     Thank You

## 2023-06-01 NOTE — TELEPHONE ENCOUNTER
Call placed to the patient per [facility] referral     Spoke with the patient on the phone  Explained comp spine to her, and that we send to PT or PM  Patient is already established with PM, and has a call into that dept for a nurse to call her back  Patient declined triage until she speaks to her PM doctor       jenelle closed

## 2023-06-01 NOTE — TELEPHONE ENCOUNTER
Caller: Greyson Chan    Doctor: Ramirez Nur    Reason for call: took patient to ER and CT scan was done please review and advise on next step    Call back#: [phone]

## 2023-06-01 NOTE — TELEPHONE ENCOUNTER
Lesa Chris    ED Visit Information     Ed visit date: 5-31-23  Diagnosis Description: Chronic Neck Pain  In Network? Yes [address]  Discharge status: Home  Discharged with meds ? Yes  Number of ED visits to date: 1  ED Severity:N/A     Outreach Information    Outreach successful: Yes 1  Date letter mailed:0  Date Sakina Adams    Follow up appointment with pcp: no waiting for an appoitnemnt   Transportation issues ? No    Value [employer] type: 7 Day Outreach  Emergent necessity warranted by diagnosis: Yes  [facility] PCP: Yes  Transportation: Friend/Family Transport  Called PCP first?: No  Feels able to call PCP for urgent problems ?: Yes  Understands what emergencies can be handled by PCP ?: Yes  Ever any problems getting appointment with PCP for minor emergency/urgency problems?: No  Practice Contacted Patient ?: No  Pt had ED follow up with pcp/staff ?: No    Seen for follow-up out of network ?: No  Reason Patient went to ED instead of Urgent Care or PCP?: Perceived Severity of Illness, Specialist instructions  Urgent care Education?: No  06/01/2023 09:06 AM EDT by Ashlee Tobias 06/01/2023 09:06 AM EDT by Ashlee Hernandes (Self) [phone] (Home)    Personal communication with patient regarding recent ED visit on 5-31-23  Patient stated that she is doing horrible and is crying in pain   Patient states her daughter in law is calling to schedule her an appt

## 2023-06-02 ENCOUNTER — TELEPHONE (OUTPATIENT)
Dept: CARDIOLOGY CLINIC | Facility: CLINIC | Age: 83
End: 2023-06-02

## 2023-06-02 NOTE — TELEPHONE ENCOUNTER
S/w pt, states she believes gabapentin was stopped when she had a stroke, but she is agreeable to restarting it if recommended  Pt requesting [employer] mail order pharmacy be used   Thank you

## 2023-06-02 NOTE — TELEPHONE ENCOUNTER
S/w pt and Daughter in law Pearla Halsted  States pt needs something to take while she wait for injection  Pt was in ED on 5/31, she was prescribed percocet but per pt this medication did not help with pain and made her sick to her stomach  Pt has been unable to eat, states pain is intolerable  Requesting further recommendations, please advise  Thank you  Per pt and Sangeethala Halsted, ok to leave detailed vm

## 2023-06-02 NOTE — TELEPHONE ENCOUNTER
Pre  Op  Clearance Note- Cardiology    Swathipepe Rodgersie   79 y o   female  1940      MD Swathi Ladd :     Patient's chart was reviewed for preop clearance for CHAUNCEY Spinal Injection June 13, 2023 and has no major cardiac contra-indication to proceed  Patient cardiac risk for surgery is high  Continue current cardiac medications  Patient can hold Eliquis for 3 days before the procedure  Please restart after the procedure when okay from surgical point of view and advise patient to contact our office  If you have any question please do not hesitate to call us at our office of [facility]    Phone # [phone]        Lab Results   Component Value Date    HCT 38 4 12/27/2022    HGB 11 9 12/27/2022    MCV 93 12/27/2022     12/27/2022    WBC 6 48 12/27/2022     Lab Results   Component Value Date    CREATININE 1 11 12/27/2022     Lab Results   Component Value Date    GLUF 98 12/27/2022       Crissy Davis MD  6/2/2023  1:43 PM

## 2023-06-02 NOTE — TELEPHONE ENCOUNTER
Sp with patient's Daughter-Kateryna;made aware of cardiac clearance and Eliquis Med Hold Instructions

## 2023-06-02 NOTE — TELEPHONE ENCOUNTER
She used to be on gabapentin  Is there a reason that was stopped? I can start her on that again at a low dose 100mg QHS to help with her pain

## 2023-06-02 NOTE — TELEPHONE ENCOUNTER
Caller: Darryle Carne    Doctor:  Eugene Vargas     Reason for call: Patient is calling stating 10 + pain and would like to know next steps please advise     Call back#: [phone]

## 2023-06-03 DIAGNOSIS — E78.5 HYPERLIPIDEMIA, UNSPECIFIED HYPERLIPIDEMIA TYPE: ICD-10-CM

## 2023-06-05 RX ORDER — ATORVASTATIN CALCIUM 40 MG/1
40 TABLET, FILM COATED ORAL EVERY EVENING
Qty: 90 TABLET | Refills: 0 | Status: SHIPPED | OUTPATIENT
Start: 2023-06-05

## 2023-06-05 RX ORDER — GABAPENTIN 100 MG/1
CAPSULE ORAL
Qty: 90 CAPSULE | Refills: 1 | Status: SHIPPED | OUTPATIENT
Start: 2023-06-05

## 2023-06-05 NOTE — TELEPHONE ENCOUNTER
Pt informed that gabapentin 100 mg at HS was sent to [facility] mail order Pharmacy  Told pt she is allowed to increase it to 2 capsules at bedtime after 3 days if 1 at HS isn't helpful  Advised pt that it can cause drowsiness and dizziness and can take 1-2 wks to see full benefit  Told pt to contact office with any s/e from the gabapentin  Pt confirmed that her inj is now scheduled for 6/13/23

## 2023-06-07 DIAGNOSIS — M54.32 SCIATICA OF LEFT SIDE: Primary | ICD-10-CM

## 2023-06-07 NOTE — TELEPHONE ENCOUNTER
Patient is requesting was discontinued by Dr Clay Vazquez? Okay, to fill? Patient needs to mail order   So # 90 day supply

## 2023-06-13 ENCOUNTER — HOSPITAL ENCOUNTER (OUTPATIENT)
Dept: RADIOLOGY | Facility: CLINIC | Age: 83
Discharge: HOME | End: 2023-06-13
Payer: MEDICARE

## 2023-06-13 VITALS
TEMPERATURE: 97.7 F | HEART RATE: 75 BPM | SYSTOLIC BLOOD PRESSURE: 128 MMHG | OXYGEN SATURATION: 96 % | DIASTOLIC BLOOD PRESSURE: 65 MMHG | RESPIRATION RATE: 20 BRPM

## 2023-06-13 DIAGNOSIS — M54.12 CERVICAL RADICULOPATHY: ICD-10-CM

## 2023-06-13 PROCEDURE — 62321 NJX INTERLAMINAR CRV/THRC: CPT | Performed by: ANESTHESIOLOGY

## 2023-06-13 RX ORDER — METHYLPREDNISOLONE ACETATE 80 MG/ML
80 INJECTION, SUSPENSION INTRA-ARTICULAR; INTRALESIONAL; INTRAMUSCULAR; PARENTERAL; SOFT TISSUE ONCE
Status: COMPLETED | OUTPATIENT
Start: 2023-06-13 | End: 2023-06-13

## 2023-06-13 RX ADMIN — IOHEXOL 1 ML: 300 INJECTION, SOLUTION INTRAVENOUS at 10:25

## 2023-06-13 RX ADMIN — METHYLPREDNISOLONE ACETATE 80 MG: 80 INJECTION, SUSPENSION INTRA-ARTICULAR; INTRALESIONAL; INTRAMUSCULAR; PARENTERAL; SOFT TISSUE at 10:25

## 2023-06-13 NOTE — H&P
History of Present Illness: The patient is a 79 y o  female who presents with complaints of neck and arm pain and is here today for a cervical epidural steroid injection    Past Medical History:   Diagnosis Date   • Anxiety    • Arthritis     joints   • Cataract    • Disease of thyroid gland    • Eczema    • GERD (gastroesophageal reflux disease)    • Gout    • Heart failure ([facility] [address] )    • Hepatitis    • Hiatal hernia    • Hypertension    • Onychomycosis     last assessed: 16   • Orthopnea     resolved: 16   • Pseudogout of left wrist    • Sleep apnea     wears CPAP   • Stroke [facility])        Past Surgical History:   Procedure Laterality Date   • ABDOMINAL SURGERY         • BRAIN HEMATOMA EVACUATION Right 2020    Procedure: Right decompressive craniectomy and evacuation of intraparenchymal hematoma; Surgeon: Kristian Pena MD;  Location: [facility] MAIN OR;  Service: Neurosurgery   • BREAST SURGERY Right     cyst removal   • CARPAL TUNNEL RELEASE Left    • CARPAL TUNNEL RELEASE Right    • CATARACT EXTRACTION     •  SECTION  1960    x1   • COLONOSCOPY N/A 2018    Procedure: COLONOSCOPY;  Surgeon: Chika Lopez MD;  Location: [facility] GI LAB; Service: Gastroenterology   • DILATION AND CURETTAGE OF UTERUS  1967   • EYE SURGERY Left     cataracts   • HERNIA REPAIR      umbilical hernia   • INCISIONAL HERNIA REPAIR      incarcerated   • KNEE ARTHROSCOPY Right    • CO RPLCMT BONE FLAP/PROSTHETIC PLATE SKULL Right 2033    Procedure: right frontotemporal replacement cranioplasty;  Surgeon: Kristian Pena MD;  Location: [facility] MAIN OR;  Service: Neurosurgery   • CO XCAPSL CTRC RMVL INSJ IO LENS PROSTH W/O ECP Right 3/27/2017    Procedure: EXTRACTION EXTRACAPSULAR CATARACT PHACO INTRAOCULAR LENS (IOL);   Surgeon: Sonu Carlisle MD;  Location: [facility] MAIN OR;  Service: Ophthalmology         Current Outpatient Medications:   •  amLODIPine (NORVASC) 10 mg tablet, Take 0 5 tablets (5 mg total) by mouth daily, Disp: 90 tablet, Rfl: 0  •  apixaban (Eliquis) 2 5 mg, Take 1 tablet (2 5 mg total) by mouth 2 (two) times a day, Disp: 56 tablet, Rfl: 0  •  atorvastatin (LIPITOR) 40 mg tablet, Take 1 tablet (40 mg total) by mouth every evening, Disp: 90 tablet, Rfl: 0  •  bumetanide (BUMEX) 2 mg tablet, Take 1 tablet (2 mg total) by mouth daily, Disp: 90 tablet, Rfl: 1  •  gabapentin (NEURONTIN) 100 mg capsule, Take 1 tablet at bedtime    May increase to 2 tablets after 3 days, Disp: 90 capsule, Rfl: 1  •  ketoconazole (NIZORAL) 2 % cream, Apply topically daily, Disp: 60 g, Rfl: 1  •  levothyroxine 88 mcg tablet, Take 1 tablet (88 mcg total) by mouth daily, Disp: 90 tablet, Rfl: 0  •  losartan (COZAAR) 25 mg tablet, TAKE 2 TABLETS IN THE MORNING AND TAKE 1 TABLET IN THE EVENING, Disp: 270 tablet, Rfl: 3  •  metoprolol tartrate (LOPRESSOR) 25 mg tablet, Take 1 tablet (25 mg total) by mouth every 12 (twelve) hours, Disp: 180 tablet, Rfl: 0  •  pantoprazole (PROTONIX) 20 mg tablet, Take 1 tablet (20 mg total) by mouth daily, Disp: 90 tablet, Rfl: 0  •  senna-docusate sodium (SENOKOT S) 8 6-50 mg per tablet, Take 1 tablet by mouth daily, Disp: 20 tablet, Rfl: 0  •  spironolactone (ALDACTONE) 25 mg tablet, Take 0 5 tablets (12 5 mg total) by mouth daily, Disp: 45 tablet, Rfl: 1    Current Facility-Administered Medications:   •  iohexol (OMNIPAQUE) 300 mg/mL injection 1 mL, 1 mL, Epidural, Once, Zeinab Fitzgerald MD  •  methylPREDNISolone acetate (DEPO-MEDROL) injection 80 mg, 80 mg, Epidural, Once, Zeinab Fitzgerald MD    No Known Allergies    Physical Exam:   Vitals:    06/13/23 1004   BP: 133/75   Pulse: 78   Resp: 20   Temp: 97 7 °F (36 5 °C)   SpO2: 98%     General: Awake, Alert, Oriented x 3, Mood and affect appropriate  Respiratory: Respirations even and unlabored  Cardiovascular: Peripheral pulses intact; no edema  Musculoskeletal Exam: Neck and arm pain    ASA Score: 3    Patient/Chart Verification  Patient ID Verified: Verbal  Consents Confirmed: To be obtained in the Pre-Procedure area  Interval H&P(within 24 hr) Complete (required for Outpatients and Surgery Admit only): To be obtained in the Pre-Procedure area  Allergies Reviewed: Yes  Anticoag/NSAID held?: Yes (stopped eliquis on Sat)  Currently on antibiotics?: No    Assessment:   1   Cervical radiculopathy        Plan: CHAUNCEY

## 2023-06-13 NOTE — DISCHARGE INSTR - LAB
Epidural Steroid Injection   WHAT YOU NEED TO KNOW:   An epidural steroid injection (BRET) is a procedure to inject steroid medicine into the epidural space  The epidural space is between your spinal cord and vertebrae  Steroids reduce inflammation and fluid buildup in your spine that may be causing pain  You may be given pain medicine along with the steroids  ACTIVITY  Do not drive or operate machinery today  No strenuous activity today - bending, lifting, etc   You may resume normal activites starting tomorrow - start slowly and as tolerated  You may shower today, but no tub baths or hot tubs  You may have numbness for several hours from the local anesthetic  Please use caution and common sense, especially with weight-bearing activities  CARE OF THE INJECTION SITE  If you have soreness or pain, apply ice to the area today (20 minutes on/20 minutes off)  Starting tomorrow, you may use warm, moist heat or ice if needed  You may have an increase or change in your discomfort for 36-48 hours after your treatment  Apply ice and continue with any pain medication you have been prescribed  Notify the [facility] if you have any of the following: redness, drainage, swelling, headache, stiff neck or fever above 100°F     SPECIAL INSTRUCTIONS  Our office will contact you in approximately 7 days for a progress report  MEDICATIONS  Continue to take all routine medications  Our office may have instructed you to hold some medications  As no general anesthesia was used in today's procedure, you should not experience any side effects related to anesthesia  If you are diabetic, the steroids used in today's injection may temporarily increase your blood sugar levels after the first few days after your injection  Please keep a close eye on your sugars and alert the doctor who manages your diabetes if your sugars are significantly high from your baseline or you are symptomatic       If you have a problem specifically related to your procedure, please call our office at [phone]  Problems not related to your procedure should be directed to your primary care physician

## 2023-06-20 ENCOUNTER — TELEPHONE (OUTPATIENT)
Dept: RADIOLOGY | Facility: MEDICAL CENTER | Age: 83
End: 2023-06-20

## 2023-06-24 DIAGNOSIS — I49.1 PAC (PREMATURE ATRIAL CONTRACTION): ICD-10-CM

## 2023-06-24 DIAGNOSIS — I48.0 PAROXYSMAL ATRIAL FIBRILLATION (HCC): ICD-10-CM

## 2023-06-24 DIAGNOSIS — E03.9 HYPOTHYROIDISM, UNSPECIFIED TYPE: Chronic | ICD-10-CM

## 2023-06-26 RX ORDER — LEVOTHYROXINE SODIUM 88 UG/1
88 TABLET ORAL DAILY
Qty: 90 TABLET | Refills: 0 | Status: SHIPPED | OUTPATIENT
Start: 2023-06-26 | End: 2023-07-05 | Stop reason: SDUPTHER

## 2023-06-26 RX ORDER — PANTOPRAZOLE SODIUM 20 MG/1
20 TABLET, DELAYED RELEASE ORAL DAILY
Qty: 90 TABLET | Refills: 0 | Status: SHIPPED | OUTPATIENT
Start: 2023-06-26 | End: 2023-07-05 | Stop reason: SDUPTHER

## 2023-06-29 ENCOUNTER — APPOINTMENT (OUTPATIENT)
Dept: LAB | Facility: CLINIC | Age: 83
End: 2023-06-29
Payer: MEDICARE

## 2023-06-29 DIAGNOSIS — E03.9 HYPOTHYROIDISM, UNSPECIFIED TYPE: Chronic | ICD-10-CM

## 2023-06-29 DIAGNOSIS — N18.31 STAGE 3A CHRONIC KIDNEY DISEASE (HCC): ICD-10-CM

## 2023-06-29 DIAGNOSIS — I10 BENIGN ESSENTIAL HTN: ICD-10-CM

## 2023-06-29 DIAGNOSIS — E78.5 HYPERLIPIDEMIA, UNSPECIFIED HYPERLIPIDEMIA TYPE: ICD-10-CM

## 2023-06-29 LAB
ALBUMIN SERPL BCP-MCNC: 3.4 G/DL (ref 3.5–5)
ALP SERPL-CCNC: 88 U/L (ref 46–116)
ALT SERPL W P-5'-P-CCNC: 24 U/L (ref 12–78)
ANION GAP SERPL CALCULATED.3IONS-SCNC: 3 MMOL/L
AST SERPL W P-5'-P-CCNC: 14 U/L (ref 5–45)
BACTERIA UR QL AUTO: NORMAL /HPF
BILIRUB SERPL-MCNC: 0.5 MG/DL (ref 0.2–1)
BILIRUB UR QL STRIP: NEGATIVE
BUN SERPL-MCNC: 40 MG/DL (ref 5–25)
CALCIUM ALBUM COR SERPL-MCNC: 10.1 MG/DL (ref 8.3–10.1)
CALCIUM SERPL-MCNC: 9.6 MG/DL (ref 8.3–10.1)
CHLORIDE SERPL-SCNC: 110 MMOL/L (ref 96–108)
CHOLEST SERPL-MCNC: 128 MG/DL
CLARITY UR: CLEAR
CO2 SERPL-SCNC: 29 MMOL/L (ref 21–32)
COLOR UR: COLORLESS
CREAT SERPL-MCNC: 1.08 MG/DL (ref 0.6–1.3)
CREAT UR-MCNC: 52 MG/DL
ERYTHROCYTE [DISTWIDTH] IN BLOOD BY AUTOMATED COUNT: 14.5 % (ref 11.6–15.1)
GFR SERPL CREATININE-BSD FRML MDRD: 47 ML/MIN/1.73SQ M
GLUCOSE P FAST SERPL-MCNC: 91 MG/DL (ref 65–99)
GLUCOSE UR STRIP-MCNC: NEGATIVE MG/DL
HCT VFR BLD AUTO: 39.4 % (ref 34.8–46.1)
HDLC SERPL-MCNC: 59 MG/DL
HGB BLD-MCNC: 12.3 G/DL (ref 11.5–15.4)
HGB UR QL STRIP.AUTO: NEGATIVE
KETONES UR STRIP-MCNC: NEGATIVE MG/DL
LDLC SERPL CALC-MCNC: 53 MG/DL (ref 0–100)
LEUKOCYTE ESTERASE UR QL STRIP: NEGATIVE
MAGNESIUM SERPL-MCNC: 2.1 MG/DL (ref 1.6–2.6)
MCH RBC QN AUTO: 29.2 PG (ref 26.8–34.3)
MCHC RBC AUTO-ENTMCNC: 31.2 G/DL (ref 31.4–37.4)
MCV RBC AUTO: 94 FL (ref 82–98)
NITRITE UR QL STRIP: NEGATIVE
NON-SQ EPI CELLS URNS QL MICRO: NORMAL /HPF
NONHDLC SERPL-MCNC: 69 MG/DL
PH UR STRIP.AUTO: 6 [PH]
PHOSPHATE SERPL-MCNC: 3.9 MG/DL (ref 2.3–4.1)
PLATELET # BLD AUTO: 159 THOUSANDS/UL (ref 149–390)
PMV BLD AUTO: 12 FL (ref 8.9–12.7)
POTASSIUM SERPL-SCNC: 4 MMOL/L (ref 3.5–5.3)
PROT SERPL-MCNC: 6.9 G/DL (ref 6.4–8.4)
PROT UR STRIP-MCNC: NEGATIVE MG/DL
PROT UR-MCNC: <6 MG/DL
RBC # BLD AUTO: 4.21 MILLION/UL (ref 3.81–5.12)
RBC #/AREA URNS AUTO: NORMAL /HPF
SODIUM SERPL-SCNC: 142 MMOL/L (ref 135–147)
SP GR UR STRIP.AUTO: 1.01 (ref 1–1.03)
TRIGL SERPL-MCNC: 80 MG/DL
TSH SERPL DL<=0.05 MIU/L-ACNC: 2.29 UIU/ML (ref 0.45–4.5)
UROBILINOGEN UR STRIP-ACNC: <2 MG/DL
WBC # BLD AUTO: 6.87 THOUSAND/UL (ref 4.31–10.16)
WBC #/AREA URNS AUTO: NORMAL /HPF

## 2023-06-29 PROCEDURE — 84156 ASSAY OF PROTEIN URINE: CPT

## 2023-06-29 PROCEDURE — 84100 ASSAY OF PHOSPHORUS: CPT

## 2023-06-29 PROCEDURE — 84443 ASSAY THYROID STIM HORMONE: CPT

## 2023-06-29 PROCEDURE — 83735 ASSAY OF MAGNESIUM: CPT

## 2023-06-29 PROCEDURE — 82570 ASSAY OF URINE CREATININE: CPT

## 2023-06-29 PROCEDURE — 80053 COMPREHEN METABOLIC PANEL: CPT

## 2023-06-29 PROCEDURE — 36415 COLL VENOUS BLD VENIPUNCTURE: CPT

## 2023-06-29 PROCEDURE — 80061 LIPID PANEL: CPT

## 2023-06-29 PROCEDURE — 85027 COMPLETE CBC AUTOMATED: CPT

## 2023-06-29 PROCEDURE — 81001 URINALYSIS AUTO W/SCOPE: CPT

## 2023-07-03 ENCOUNTER — RA CDI HCC (OUTPATIENT)
Dept: OTHER | Facility: HOSPITAL | Age: 83
End: 2023-07-03

## 2023-07-03 NOTE — PROGRESS NOTES
[address] [facility] coding opportunities          Chart Reviewed number of suggestions sent to Provider: 1   I13.0    Patients Insurance     Medicare Insurance: [employer]

## 2023-07-05 ENCOUNTER — OFFICE VISIT (OUTPATIENT)
Dept: FAMILY MEDICINE CLINIC | Facility: CLINIC | Age: 83
End: 2023-07-05
Payer: MEDICARE

## 2023-07-05 VITALS
HEART RATE: 88 BPM | HEIGHT: 61 IN | DIASTOLIC BLOOD PRESSURE: 68 MMHG | WEIGHT: 163 LBS | BODY MASS INDEX: 30.78 KG/M2 | OXYGEN SATURATION: 98 % | SYSTOLIC BLOOD PRESSURE: 118 MMHG

## 2023-07-05 DIAGNOSIS — I48.0 PAROXYSMAL ATRIAL FIBRILLATION (HCC): ICD-10-CM

## 2023-07-05 DIAGNOSIS — E03.9 HYPOTHYROIDISM, UNSPECIFIED TYPE: Chronic | ICD-10-CM

## 2023-07-05 DIAGNOSIS — Z00.00 MEDICARE ANNUAL WELLNESS VISIT, SUBSEQUENT: ICD-10-CM

## 2023-07-05 DIAGNOSIS — R73.03 PREDIABETES: Primary | ICD-10-CM

## 2023-07-05 DIAGNOSIS — E78.5 HYPERLIPIDEMIA, UNSPECIFIED HYPERLIPIDEMIA TYPE: ICD-10-CM

## 2023-07-05 DIAGNOSIS — I50.32 CHRONIC DIASTOLIC (CONGESTIVE) HEART FAILURE (HCC): ICD-10-CM

## 2023-07-05 DIAGNOSIS — I49.1 PAC (PREMATURE ATRIAL CONTRACTION): ICD-10-CM

## 2023-07-05 PROCEDURE — G0439 PPPS, SUBSEQ VISIT: HCPCS | Performed by: FAMILY MEDICINE

## 2023-07-05 PROCEDURE — 99214 OFFICE O/P EST MOD 30 MIN: CPT | Performed by: FAMILY MEDICINE

## 2023-07-05 RX ORDER — PANTOPRAZOLE SODIUM 20 MG/1
20 TABLET, DELAYED RELEASE ORAL DAILY
Qty: 90 TABLET | Refills: 1 | Status: SHIPPED | OUTPATIENT
Start: 2023-07-05

## 2023-07-05 RX ORDER — ATORVASTATIN CALCIUM 40 MG/1
40 TABLET, FILM COATED ORAL EVERY EVENING
Qty: 90 TABLET | Refills: 1 | Status: SHIPPED | OUTPATIENT
Start: 2023-07-05

## 2023-07-05 RX ORDER — LEVOTHYROXINE SODIUM 88 UG/1
88 TABLET ORAL DAILY
Qty: 90 TABLET | Refills: 1 | Status: SHIPPED | OUTPATIENT
Start: 2023-07-05

## 2023-07-05 NOTE — PROGRESS NOTES
Assessment and Plan:     Problem List Items Addressed This Visit    None    BMI Counseling: Body mass index is 30.8 kg/m². The BMI is above normal. Nutrition recommendations include encouraging healthy choices of fruits and vegetables. Exercise recommendations include strength training exercises. Rationale for BMI follow-up plan is due to patient being overweight or obese. Depression Screening and Follow-up Plan: Patient was screened for depression during today's encounter. They screened negative with a PHQ-2 score of 0. Preventive health issues were discussed with patient, and age appropriate screening tests were ordered as noted in patient's After Visit Summary. Personalized health advice and appropriate referrals for health education or preventive services given if needed, as noted in patient's After Visit Summary. History of Present Illness:     Patient presents for a Medicare Wellness Visit    HPI     Patient is following up on her chronic medical problems. Has history of hypertension, A-fib, dyslipidemia, CKD, prediabetes, hypothyroidism. Labs reviewed with patient today. Denies any symptoms of chest pain or shortness of breath. Noted to have mild swelling in both her legs since she missed her spironolactone yesterday. Patient took the medication this morning. Patient Care Team:  Chanelle Lyle MD as PCP - MD Aiden Ag MD Carollee Hoyles, MD Tresa Ege, MD Nida Kaiser, MD as Endoscopist     Review of Systems:     Review of Systems   Constitutional: Negative. Respiratory: Negative. Cardiovascular: Positive for leg swelling.         Problem List:     Patient Active Problem List   Diagnosis   • Benign essential hypertension   • Chronic diastolic (congestive) heart failure (HCC)   • Hypothyroidism   • Arthropathy of knee   • Hallux abductovalgus with bunions, unspecified laterality   • CKD (chronic kidney disease), stage III ([address])   • Diverticulitis of colon   • Chronic gout without tophus   • Hiatal hernia   • Hyperlipemia   • Hyperuricemia   • Nephrolithiasis   • WENDI (obstructive sleep apnea)   • Pseudogout of left wrist   • Gastroesophageal reflux disease without esophagitis   • Exertional shortness of breath   • Prediabetes   • Status post right frontotemporal replacement cranioplasty   • BMI 38.0-38.9,adult   • Sciatica of left side   • Spinal stenosis of lumbar region with neurogenic claudication   • Cervical radiculopathy      Past Medical and Surgical History:     Past Medical History:   Diagnosis Date   • Anxiety    • Arthritis     joints   • Cataract    • Disease of thyroid gland    • Eczema    • GERD (gastroesophageal reflux disease)    • Gout    • Heart failure (HCC)    • Hepatitis    • Hiatal hernia    • Hypertension    • Onychomycosis     last assessed: 16   • Orthopnea     resolved: 16   • Pseudogout of left wrist    • Sleep apnea     wears CPAP   • Stroke [facility])      Past Surgical History:   Procedure Laterality Date   • ABDOMINAL SURGERY         • BRAIN HEMATOMA EVACUATION Right 2020    Procedure: Right decompressive craniectomy and evacuation of intraparenchymal hematoma; Surgeon: Ruddy Alcantar MD;  Location:  MAIN OR;  Service: Neurosurgery   • BREAST SURGERY Right     cyst removal   • CARPAL TUNNEL RELEASE Left    • CARPAL TUNNEL RELEASE Right    • CATARACT EXTRACTION     •  SECTION  1960    x1   • COLONOSCOPY N/A 2018    Procedure: COLONOSCOPY;  Surgeon: Katt Cm MD;  Location: [address] [place] [address] [address] GI LAB;   Service: Gastroenterology   • DILATION AND CURETTAGE OF UTERUS  1967   • EYE SURGERY Left     cataracts   • HERNIA REPAIR      umbilical hernia   • INCISIONAL HERNIA REPAIR      incarcerated   • KNEE ARTHROSCOPY Right    • KS RPLCMT BONE FLAP/PROSTHETIC PLATE SKULL Right [id number]    Procedure: right frontotemporal replacement cranioplasty;  Surgeon: Ruddy Alcantar MD;  Location: [facility];  Service: Neurosurgery   • ID XCAPSL CTRC RMVL INSJ IO LENS PROSTH W/O ECP Right 3/27/2017    Procedure: EXTRACTION EXTRACAPSULAR CATARACT PHACO INTRAOCULAR LENS (IOL); Surgeon: Elisa Salazar MD;  Location: [facility] MAIN OR;  Service: Ophthalmology      Family History:     Family History   Problem Relation Age of Onset   • Diabetes Mother    • Coronary artery disease Mother    • Arthritis Mother    • Hypertension Mother    • Hypertension Father    • Diabetes Brother    • Diabetes Son    • Arthritis Family       Social History:     Social History     Socioeconomic History   • Marital status:      Spouse name: Not on file   • Number of children: Not on file   • Years of education: Not on file   • Highest education level: Not on file   Occupational History   • Not on file   Tobacco Use   • Smoking status: Never   • Smokeless tobacco: Never   Vaping Use   • Vaping Use: Never used   Substance and Sexual Activity   • Alcohol use: Yes     Alcohol/week: 0.0 standard drinks of alcohol     Comment: Rare. • Drug use: Never   • Sexual activity: Not Currently     Birth control/protection: Post-menopausal   Other Topics Concern   • Not on file   Social History Narrative    Daily coffee consumption    Lack of exercise    Sleeps 6-7 hours a day     Social Determinants of Health     Financial Resource Strain: Not on file   Food Insecurity: Not on file   Transportation Needs: No Transportation Needs (11/5/2020)    PRAPARE - Transportation    • Lack of Transportation (Medical): No    • Lack of Transportation (Non-Medical):  No   Physical Activity: Not on file   Stress: Not on file   Social Connections: Not on file   Intimate Partner Violence: Not on file   Housing Stability: Not on file      Medications and Allergies:     Current Outpatient Medications   Medication Sig Dispense Refill   • amLODIPine (NORVASC) 10 mg tablet Take 0.5 tablets (5 mg total) by mouth daily 90 tablet 0   • apixaban (Eliquis) 2.5 mg Take 1 tablet (2.5 mg total) by mouth 2 (two) times a day 56 tablet 0   • atorvastatin (LIPITOR) 40 mg tablet Take 1 tablet (40 mg total) by mouth every evening 90 tablet 0   • bumetanide (BUMEX) 2 mg tablet Take 1 tablet (2 mg total) by mouth daily 90 tablet 1   • gabapentin (NEURONTIN) 100 mg capsule Take 1 tablet at bedtime. May increase to 2 tablets after 3 days 90 capsule 1   • ketoconazole (NIZORAL) 2 % cream Apply topically daily 60 g 1   • levothyroxine 88 mcg tablet Take 1 tablet (88 mcg total) by mouth daily 90 tablet 0   • losartan (COZAAR) 25 mg tablet TAKE 2 TABLETS IN THE MORNING AND TAKE 1 TABLET IN THE EVENING 270 tablet 3   • metoprolol tartrate (LOPRESSOR) 25 mg tablet Take 1 tablet (25 mg total) by mouth every 12 (twelve) hours 180 tablet 0   • pantoprazole (PROTONIX) 20 mg tablet Take 1 tablet (20 mg total) by mouth daily 90 tablet 0   • senna-docusate sodium (SENOKOT S) 8.6-50 mg per tablet Take 1 tablet by mouth daily 20 tablet 0   • spironolactone (ALDACTONE) 25 mg tablet Take 0.5 tablets (12.5 mg total) by mouth daily 45 tablet 1     No current facility-administered medications for this visit. No Known Allergies   Immunizations:     Immunization History   Administered Date(s) Administered   • COVID-19 PFIZER VACCINE 0.3 ML IM 01/31/2021, 02/20/2021, 11/27/2021   • COVID-19 Pfizer Vac BIVALENT David-sucrose 12 Yr+ IM (BOOSTER ONLY) 10/20/2022   • INFLUENZA 10/20/2022   • Influenza Split High Dose Preservative Free IM 10/09/2012, 09/24/2013, 09/09/2014, 10/02/2015, 09/23/2016, 10/24/2017   • Influenza, high dose seasonal 0.7 mL 10/05/2018, 10/08/2020   • Influenza, seasonal, injectable 10/25/2001, 10/28/2005, 10/10/2006, 10/12/2007, 10/14/2008, 09/17/2010, 09/23/2011   • Pneumococcal Conjugate 13-Valent 11/15/2017   • Pneumococcal Polysaccharide PPV23 10/10/2006, 07/10/2020   • Zoster 11/30/2017   • influenza, trivalent, adjuvanted 10/09/2019      Health Maintenance:      There are no preventive care reminders to display for this patient. Topic Date Due   • COVID-19 Vaccine (5 - Pfizer series) 02/20/2023   • Influenza Vaccine (1) 09/01/2023      Medicare Screening Tests and Risk Assessments:     Lico Simons is here for her Subsequent Wellness visit. Last Medicare Wellness visit information reviewed, patient interviewed, no change since last AWV. Health Risk Assessment:   Patient rates overall health as good. Patient feels that their physical health rating is same. Patient is satisfied with their life. Eyesight was rated as slightly worse. Hearing was rated as same. Patient feels that their emotional and mental health rating is slightly worse. Patients states they are never, rarely angry. Patient states they are sometimes unusually tired/fatigued. Pain experienced in the last 7 days has been some. Patient's pain rating has been 5/10. Patient states that she has experienced no weight loss or gain in last 6 months. Depression Screening:   PHQ-2 Score: 0      Fall Risk Screening: In the past year, patient has experienced: history of falling in past year      Urinary Incontinence Screening:   Patient has not leaked urine accidently in the last six months. Home Safety:  Patient does not have trouble with stairs inside or outside of their home. Patient has working smoke alarms and has working carbon monoxide detector. Home safety hazards include: none. Nutrition:   Current diet is Regular. Medications:   Patient is not currently taking any over-the-counter supplements. Patient is able to manage medications. Activities of Daily Living (ADLs)/Instrumental Activities of Daily Living (IADLs):   Walk and transfer into and out of bed and chair?: Yes  Dress and groom yourself?: Yes    Bathe or shower yourself?: Yes    Feed yourself?  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 Equipment Suppliers  [employer]    Previous Hospitalizations:   Any hospitalizations or ED visits within the last 12 months?: No      Advance Care Planning:   Living will: Yes    Durable POA for healthcare: Yes    Advanced directive: No      Cognitive Screening:   Provider or family/friend/caregiver concerned regarding cognition?: No    PREVENTIVE SCREENINGS      Cardiovascular Screening:    General: Screening Not Indicated and History Lipid Disorder      Diabetes Screening:     General: Screening Current      Colorectal Cancer Screening:     General: Risks and Benefits Discussed and Screening Not Indicated      Breast Cancer Screening:     General: Risks and Benefits Discussed and Screening Not Indicated      Cervical Cancer Screening:    General: Screening Not Indicated      Osteoporosis Screening:    General: Screening Not Indicated and Risks and Benefits Discussed      Abdominal Aortic Aneurysm (AAA) Screening:        General: Screening Not Indicated      Lung Cancer Screening:     General: Screening Not Indicated    Screening, Brief Intervention, and Referral to Treatment (SBIRT)    Screening  Typical number of drinks in a day: 0  Typical number of drinks in a week: 0  Interpretation: Low risk drinking behavior. Single Item Drug Screening:  How often have you used an illegal drug (including marijuana) or a prescription medication for non-medical reasons in the past year? never    Single Item Drug Screen Score: 0  Interpretation: Negative screen for possible drug use disorder    Other Counseling Topics:   Skin self-exam, sunscreen and regular weightbearing exercise. No results found. Physical Exam:     There were no vitals taken for this visit. Physical Exam  Constitutional:       Appearance: Normal appearance. Cardiovascular:      Heart sounds: Normal heart sounds. Pulmonary:      Breath sounds: Normal breath sounds.    Musculoskeletal:      Right lower leg: Edema present. Left lower leg: Edema present. Neurological:      Mental Status: She is oriented to person, place, and time.    Psychiatric:         Mood and Affect: Mood normal.          Bayron Mckeon MD

## 2023-07-05 NOTE — ASSESSMENT & PLAN NOTE
Reviewed with patient today. A1c stable at 5.8  Encouraged to continue with low carb diet. Continue monitoring.

## 2023-07-05 NOTE — ASSESSMENT & PLAN NOTE
Wt Readings from Last 3 Encounters:   07/05/23 73.9 kg (163 lb)   05/26/23 71.7 kg (158 lb)   05/24/23 71.7 kg (158 lb)     Patient denies any symptoms of shortness of breath. No recent weight change.   Continue bumetanide

## 2023-07-05 NOTE — PATIENT INSTRUCTIONS
Medicare Preventive Visit Patient Instructions  Thank you for completing your Welcome to Medicare Visit or Medicare Annual Wellness Visit today. Your next wellness visit will be due in one year (7/5/2024). The screening/preventive services that you may require over the next 5-10 years are detailed below. Some tests may not apply to you based off risk factors and/or age. Screening tests ordered at today's visit but not completed yet may show as past due. Also, please note that scanned in results may not display below. Preventive Screenings:  Service Recommendations Previous Testing/Comments   Colorectal Cancer Screening  * Colonoscopy    * Fecal Occult Blood Test (FOBT)/Fecal Immunochemical Test (FIT)  * Fecal DNA/Cologuard Test  * Flexible Sigmoidoscopy Age: 43-73 years old   Colonoscopy: every 10 years (may be performed more frequently if at higher risk)  OR  FOBT/FIT: every 1 year  OR  Cologuard: every 3 years  OR  Sigmoidoscopy: every 5 years  Screening may be recommended earlier than age 39 if at higher risk for colorectal cancer. Also, an individualized decision between you and your healthcare provider will decide whether screening between the ages of 77-80 would be appropriate. Colonoscopy: Not on file  FOBT/FIT: Not on file  Cologuard: Not on file  Sigmoidoscopy: Not on file          Breast Cancer Screening Age: 36 years old  Frequency: every 1-2 years  Not required if history of left and right mastectomy Mammogram: 05/23/2017        Cervical Cancer Screening Between the ages of 21-29, pap smear recommended once every 3 years. Between the ages of 32-69, can perform pap smear with HPV co-testing every 5 years.    Recommendations may differ for women with a history of total hysterectomy, cervical cancer, or abnormal pap smears in past. Pap Smear: Not on file    Screening Not Indicated   Hepatitis C Screening Once for adults born between [address] [place] [address]  More frequently in patients at high risk for Hepatitis C Hep C Antibody: Not on file        Diabetes Screening 1-2 times per year if you're at risk for diabetes or have pre-diabetes Fasting glucose: 91 mg/dL (6/29/2023)  A1C: 5.6 % (12/27/2022)  Screening Current   Cholesterol Screening Once every 5 years if you don't have a lipid disorder. May order more often based on risk factors. Lipid panel: 06/29/2023    Screening Not Indicated  History Lipid Disorder     Other Preventive Screenings Covered by Medicare:  1. Abdominal Aortic Aneurysm (AAA) Screening: covered once if your at risk. You're considered to be at risk if you have a family history of AAA. 2. Lung Cancer Screening: covers low dose CT scan once per year if you meet all of the following conditions: (1) Age 48-67; (2) No signs or symptoms of lung cancer; (3) Current smoker or have quit smoking within the last 15 years; (4) You have a tobacco smoking history of at least 20 pack years (packs per day multiplied by number of years you smoked); (5) You get a written order from a healthcare provider. 3. Glaucoma Screening: covered annually if you're considered high risk: (1) You have diabetes OR (2) Family history of glaucoma OR (3)  aged 48 and older OR (3)  American aged 72 and older  3. Osteoporosis Screening: covered every 2 years if you meet one of the following conditions: (1) You're estrogen deficient and at risk for osteoporosis based off medical history and other findings; (2) Have a vertebral abnormality; (3) On glucocorticoid therapy for more than 3 months; (4) Have primary hyperparathyroidism; (5) On osteoporosis medications and need to assess response to drug therapy. · Last bone density test (DXA Scan): 04/02/2019.  5. HIV Screening: covered annually if you're between the age of 15-65. Also covered annually if you are younger than 13 and older than 72 with risk factors for HIV infection.  For pregnant patients, it is covered up to 3 times per pregnancy. Immunizations:  Immunization Recommendations   Influenza Vaccine Annual influenza vaccination during flu season is recommended for all persons aged >= 6 months who do not have contraindications   Pneumococcal Vaccine   * Pneumococcal conjugate vaccine = PCV13 (Prevnar 13), PCV15 (Vaxneuvance), PCV20 (Prevnar 20)  * Pneumococcal polysaccharide vaccine = PPSV23 (Pneumovax) Adults 20-63 years old: 1-3 doses may be recommended based on certain risk factors  Adults 72 years old: 1-2 doses may be recommended based off what pneumonia vaccine you previously received   Hepatitis B Vaccine 3 dose series if at intermediate or high risk (ex: diabetes, end stage renal disease, liver disease)   Tetanus (Td) Vaccine - COST NOT COVERED BY MEDICARE PART B Following completion of primary series, a booster dose should be given every 10 years to maintain immunity against tetanus. Td may also be given as tetanus wound prophylaxis. Tdap Vaccine - COST NOT COVERED BY MEDICARE PART B Recommended at least once for all adults. For pregnant patients, recommended with each pregnancy. Shingles Vaccine (Shingrix) - COST NOT COVERED BY MEDICARE PART B  2 shot series recommended in those aged 48 and above     Health Maintenance Due:  There are no preventive care reminders to display for this patient. Immunizations Due:      Topic Date Due   • COVID-19 Vaccine (5 - Pfizer series) 02/20/2023   • Influenza Vaccine (1) 09/01/2023     Advance Directives   What are advance directives? Advance directives are legal documents that state your wishes and plans for medical care. These plans are made ahead of time in case you lose your ability to make decisions for yourself. Advance directives can apply to any medical decision, such as the treatments you want, and if you want to donate organs. What are the types of advance directives? There are many types of advance directives, and each state has rules about how to use them.  You may choose a combination of any of the following:  · Living will: This is a written record of the treatment you want. You can also choose which treatments you do not want, which to limit, and which to stop at a certain time. This includes surgery, medicine, IV fluid, and tube feedings. · Durable power of  for healthcare [facility]): This is a written record that states who you want to make healthcare choices for you when you are unable to make them for yourself. This person, called a proxy, is usually a family member or a friend. You may choose more than 1 proxy. · Do not resuscitate (DNR) order:  A DNR order is used in case your heart stops beating or you stop breathing. It is a request not to have certain forms of treatment, such as CPR. A DNR order may be included in other types of advance directives. · Medical directive: This covers the care that you want if you are in a coma, near death, or unable to make decisions for yourself. You can list the treatments you want for each condition. Treatment may include pain medicine, surgery, blood transfusions, dialysis, IV or tube feedings, and a ventilator (breathing machine). · Values history: This document has questions about your views, beliefs, and how you feel and think about life. This information can help others choose the care that you would choose. Why are advance directives important? An advance directive helps you control your care. Although spoken wishes may be used, it is better to have your wishes written down. Spoken wishes can be misunderstood, or not followed. Treatments may be given even if you do not want them. An advance directive may make it easier for your family to make difficult choices about your care. Fall Prevention    Fall prevention  includes ways to make your home and other areas safer. It also includes ways you can move more carefully to prevent a fall.  Health conditions that cause changes in your blood pressure, vision, or muscle strength and coordination may increase your risk for falls. Medicines may also increase your risk for falls if they make you dizzy, weak, or sleepy. Fall prevention tips:   · Stand or sit up slowly. · Use assistive devices as directed. · Wear shoes that fit well and have soles that . · Wear a personal alarm. · Stay active. · Manage your medical conditions. Home Safety Tips:  · Add items to prevent falls in the bathroom. · Keep paths clear. · Install bright lights in your home. · Keep items you use often on shelves within reach. · Paint or place reflective tape on the edges of your stairs. Weight Management   Why it is important to manage your weight:  Being overweight increases your risk of health conditions such as heart disease, high blood pressure, type 2 diabetes, and certain types of cancer. It can also increase your risk for osteoarthritis, sleep apnea, and other respiratory problems. Aim for a slow, steady weight loss. Even a small amount of weight loss can lower your risk of health problems. How to lose weight safely:  A safe and healthy way to lose weight is to eat fewer calories and get regular exercise. You can lose up about 1 pound a week by decreasing the number of calories you eat by 500 calories each day. Healthy meal plan for weight management:  A healthy meal plan includes a variety of foods, contains fewer calories, and helps you stay healthy. A healthy meal plan includes the following:  · Eat whole-grain foods more often. A healthy meal plan should contain fiber. Fiber is the part of grains, fruits, and vegetables that is not broken down by your body. Whole-grain foods are healthy and provide extra fiber in your diet. Some examples of whole-grain foods are whole-wheat breads and pastas, oatmeal, brown rice, and bulgur. · Eat a variety of vegetables every day. Include dark, leafy greens such as spinach, kale, elijah greens, and mustard greens. Eat yellow and orange vegetables such as carrots, sweet potatoes, and winter squash. · Eat a variety of fruits every day. Choose fresh or canned fruit (canned in its own juice or light syrup) instead of juice. Fruit juice has very little or no fiber. · Eat low-fat dairy foods. Drink fat-free (skim) milk or 1% milk. Eat fat-free yogurt and low-fat cottage cheese. Try low-fat cheeses such as mozzarella and other reduced-fat cheeses. · Choose meat and other protein foods that are low in fat. Choose beans or other legumes such as split peas or lentils. Choose fish, skinless poultry (chicken or turkey), or lean cuts of red meat (beef or pork). Before you cook meat or poultry, cut off any visible fat. · Use less fat and oil. Try baking foods instead of frying them. Add less fat, such as margarine, sour cream, regular salad dressing and mayonnaise to foods. Eat fewer high-fat foods. Some examples of high-fat foods include french fries, doughnuts, ice cream, and cakes. · Eat fewer sweets. Limit foods and drinks that are high in sugar. This includes candy, cookies, regular soda, and sweetened drinks. Exercise:  Exercise at least 30 minutes per day on most days of the week. Some examples of exercise include walking, biking, dancing, and swimming. You can also fit in more physical activity by taking the stairs instead of the elevator or parking farther away from stores. Ask your healthcare provider about the best exercise plan for you. © Copyright [employer] 2018 Information is for End User's use only and may not be sold, redistributed or otherwise used for commercial purposes.  All illustrations and images included in CareNotes® are the copyrighted property of [employer] or  [employer]

## 2023-07-11 DIAGNOSIS — I34.0 MODERATE TO SEVERE MITRAL REGURGITATION: ICD-10-CM

## 2023-07-11 DIAGNOSIS — I50.32 CHRONIC DIASTOLIC (CONGESTIVE) HEART FAILURE (HCC): ICD-10-CM

## 2023-07-11 DIAGNOSIS — I48.91 ATRIAL FIBRILLATION BY ELECTROCARDIOGRAM (HCC): ICD-10-CM

## 2023-07-12 NOTE — DISCHARGE INSTRUCTIONS
Arm Pain   WHAT YOU NEED TO KNOW:   Your arm pain may be caused by a number of conditions  Examples include arthritis, nerve problems, or an awkward position while you sleep  X-rays did not show a broken bone in your arm or wrist  Arm pain may be a sign of a serious condition that needs immediate care, such as a heart attack  DISCHARGE INSTRUCTIONS:   Call 911 for any of the following: You have any of the following signs of a heart attack:   · Squeezing, pressure, or pain in your chest that lasts longer than 5 minutes or returns    · Discomfort or pain in your back, neck, jaw, stomach, or arm     · Trouble breathing or a fast, fluttery heartbeat    · Nausea or vomiting    · Lightheadedness or a sudden cold sweat, especially with chest pain or trouble breathing  Return to the emergency department if:   · You have severe pain, or pain that spreads from your arm to other areas  · You have swelling, tingling, or numbness in your hand or fingers, or the skin turns blue  · You cannot move your arm  Contact your healthcare provider if:   · You have questions or concerns about your condition or care  Medicines: You may need any of the following:  · Prescription pain medicine  may be given  Ask how to take this medicine safely  · NSAIDs , such as ibuprofen, help decrease swelling, pain, and fever  This medicine is available with or without a doctor's order  NSAIDs can cause stomach bleeding or kidney problems in certain people  If you take blood thinner medicine, always ask your healthcare provider if NSAIDs are safe for you  Always read the medicine label and follow directions  · Take your medicine as directed  Contact your healthcare provider if you think your medicine is not helping or if you have side effects  Tell him or her if you are allergic to any medicine  Keep a list of the medicines, vitamins, and herbs you take  Include the amounts, and when and why you take them   Bring the list or the pill Requesting pharmacy change.   Feels fluoxetine 40 dosage working well.    Disp Refills Start End    fluoxetine (PROzac) 40 MG capsule 90 capsule 0 5/16/2023     Sig - Route: Take 1 capsule by mouth daily. - Oral    Sent to pharmacy as: FLUoxetine HCl 40 MG Oral Capsule (PROzac)    Class: Eprescribe    E-Prescribing Status: Receipt confirmed by pharmacy (5/16/2023  4:49 PM CDT)        Would you like pt to f/u? Ok to fill?     bottles to follow-up visits  Carry your medicine list with you in case of an emergency  Self-care:   · Rest your arm as directed  A sling may be used to keep your arm from moving while it heals  · Apply ice as directed  Ice helps decrease pain and swelling  Ice may also help prevent tissue damage  Use an ice pack, or put crushed ice in a plastic bag  Cover it with a towel  Apply it to your arm for 20 minutes every few hours, or as directed  Ask how many times to apply ice each day, and for how many days  · Elevate your arm above the level of your heart as often as you can  This will help decrease swelling and pain  Prop your arm on pillows or blankets to keep the area elevated comfortably  · Adjust your position if you work in front of a computer  You may need arm or wrist supports or change the height of your chair  · Keep a pain record  Write down when your pain happens and how severe it is  Include any other symptoms you have with your pain  A record will help you keep track of pain cycles  Bring the record with you to your follow-up visits  It may also help your healthcare provider find out what is causing your pain  Follow up with your healthcare provider as directed: You may need physical therapy  You may need to see an orthopedic specialist  Write down your questions so you remember to ask them during your visits  © 2017 [employer] Information is for End User's use only and may not be sold, redistributed or otherwise used for commercial purposes  All illustrations and images included in CareNotes® are the copyrighted property of [employer]  or [address]  The above information is an  only  It is not intended as medical advice for individual conditions or treatments  Talk to your doctor, nurse or pharmacist before following any medical regimen to see if it is safe and effective for you

## 2023-07-18 ENCOUNTER — OFFICE VISIT (OUTPATIENT)
Dept: NEPHROLOGY | Facility: CLINIC | Age: 83
End: 2023-07-18
Payer: MEDICARE

## 2023-07-18 VITALS
HEIGHT: 61 IN | BODY MASS INDEX: 29.83 KG/M2 | DIASTOLIC BLOOD PRESSURE: 58 MMHG | WEIGHT: 158 LBS | SYSTOLIC BLOOD PRESSURE: 108 MMHG | HEART RATE: 91 BPM

## 2023-07-18 DIAGNOSIS — M54.2 CHRONIC NECK PAIN: ICD-10-CM

## 2023-07-18 DIAGNOSIS — N18.30 STAGE 3 CHRONIC KIDNEY DISEASE, UNSPECIFIED WHETHER STAGE 3A OR 3B CKD (HCC): Primary | Chronic | ICD-10-CM

## 2023-07-18 DIAGNOSIS — G89.29 CHRONIC NECK PAIN: ICD-10-CM

## 2023-07-18 PROCEDURE — 99214 OFFICE O/P EST MOD 30 MIN: CPT | Performed by: INTERNAL MEDICINE

## 2023-07-18 RX ORDER — AMOXICILLIN 250 MG
1 CAPSULE ORAL DAILY
Qty: 20 TABLET | Refills: 1 | Status: SHIPPED | OUTPATIENT
Start: 2023-07-18

## 2023-07-18 NOTE — PROGRESS NOTES
NEPHROLOGY OFFICE VISIT   Zoie Will 79 y.o. female MRN: [id number]  7/18/2023    Reason for Visit: CKD III    ASSESSMENT and PLAN:    I had the pleasure of seeing Ms Melissa Daughters today in the renal clinic for the continued management of CKD III.     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  Completed course of Keppra for seizure prophylaxis.     3/30/2020 -Geraldyne Angry was increased to 50 mg twice a day from 50 in the morning and 25 in the evening due to high blood pressures     4/3/2020 - spironolactone 12.5 mg daily was started due to continued high blood pressure     4/2020 - patient had presented for replacement current of cranioplasty on April 6.  And subsequently was admitted to the rehab center postoperatively      March 2021- patient was admitted to hospital with not feeling well and   Headache.  CT scan of the head with no acute intracranial pathology. The other symptoms of memory loss were also present.  Patient was referred to geriatrician.     April 2021 - sciatic pain. Received brief steroid course     5/2021 - back pain. Went to ER. Given pred taper    5/2023 - neck pain. CT of spine -  no cervical spine fracture or traumatic malalignment     54-year-old female with a past medical history of chronic kidney disease, venous stasis, HTN, hypothyroidism, lumbar canal stenosis, Anxiety, GERD, WENDI, neuropathy, Gout, EF 04-29%, Grade 2 diastolic dysfunction who presents for follow up for CKD. To note, patient's  has now passed away in August.     1) CKD III - patient also has a long standing history of HTN. Creatinine in 2013, 0.9 mg/dL. Cr early 2016 was 0.80-0.9 mg/dL; then increased starting in august 2016 to 2.3 mg/dL and has fluctuated since. CT scan in 2016, kidneys appearing normal at that time.      Etiology of CKD may be long standing HTN and ATN.  Baseline Cr ~ 1.1 -1.3 mg/dL     - Urine eos 0%;   - A1c controlled prior  at 5.6% 12/2022  - UPCR too low to quant (6/2023)  - UA bland 6/2023  -SPEP unrevealing  -UPEP unrevealing  - C3, C4 unrevealing  - Renal u/s with R 10.3 cm, L 10.4 cm, renal cortex 1 cm b/l; both kidneys slightly reduced in size; lower pole of R kidney is non obstructing calculus  - Pt follows with Urology team for nephrolithiasis. - No NSAIDs, the patient is not currently taking NSAID  -nuc stress test per Cardiology in Jan 2019 unrevealing  - repeat renal u/s 1/2019 - unrevealing with exception of renal cortical atrophy; but also 6 mm non shadowing calculus. Overall, patient's creatinine is 1.08 mg/dL. Electrolytes are stable and at goal hemoglobin at goal 12.3. Urinalysis is bland.     Plan:     - no changes today  - labs in 5 to 6 months with appointment in 6 months  -cont metoprolol 25 mg twice daily  - cont amlodipine 5 mg daily, spironolactone half tab daily  -Overall, patient's blood pressure is lower on the lower end of normal but she is asymptomatic with the exception of fatigue. Fatigue may be related to gabapentin. The patient feels that she notices the symptoms worse after taking gabapentin. I advised that she lowers gabapentin dose to 100 mg nightly from 200 mg nightly. I reiterated with the patient and her daughter that we cannot discontinue currently. They will check blood pressures for 1 to 2 weeks and send log. If blood pressure still remains marginal, we could consider decreasing amlodipine. Daughter and patient are in agreement with plan. - Given severe neck pain the patient has gone through the past couple of months. I did explain that it is okay to use Voltaren gel very briefly.      2) SOB - Volume - follows with Cardiology     - on Bumetanide and is euvolemic on July 2023 appointment     3) HTN -      - cont losartan, bumex and amlodipine, spironolactone  -  due to bradycardia, beta-blocker was held prior but given new onset atrial fibrillation around May 2022, patient was restarted on metoprolol per Cardiology team  - no changes today. See above.      4) hypothyroid - as per Primary Care Physician     5) HPL - Primary Care following.      6) Electrolytes - stable     7) MBD -     - Vit D 50.7 in nov 2019 --> 40.8 March 2022  - PTH improved 89 in nov 2019 --> 78.6 in June 2020 --> 88 3/8/2022 --> 136 December 2022     8) gout -      -monitor for flare     9) back pain - follows with Pain management     - pain sig improved with inj     10) Colonoscopy in feb with internal hemorrhoids and polyp removed.      11) alk phos elevation     -improved     12) Anemia - Hb stable     -hemoglobin stable and at goal     13) Mitral valve calcification     - moderate to severe mitral regurgitation  -eventually may require further evaluation.  Being monitored for now conservatively. - per Cardiology team     14) elevated D-dimer prior-patient was given duplex of lower extremities which was unrevealing prior     15) knee pain after fall in august 2020     - saw Orthopedic team  - steroid injection was given     16) intracranial hemorrhage with mass effect requiring craniotomy on 2/11/2020     - now is status post replacement of cranioplasty in April 2020     17)  Anxiety- patient was started on Cymbalta. PCP is attempting off of gabapentin     - there were periods of not taking cymbalta     18)   Zoster infection in November 2020-treated with Valtrex per primary team     19) a fib - new onset in May 2022     -was advised to have Holter monitor. 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  Was started on low-dose beta-blocker with metoprolol.  -in July metoprolol was titrated further to 25 mg twice a day  -was started on Eliquis 2.5 mg twice a day.  Lower dose was chosen due to high risk factors.     It was a pleasure evaluating your patient. Thank you for allowing our team to participate in the care of Ms Zoie Will. Please do not hesitate to contact our team if further issues/questions shall arise in the interim       No problem-specific Assessment & Plan notes found for this encounter. HPI:    Denies complaints. With the exception of fatigue. No fevers, chills, nausea, vomiting. PATIENT INSTRUCTIONS:    Patient Instructions   1) Avoid NSAIDS - (Example - motrin, advil, ibuprofen, aleve, exederin, etc)  2) Always follow a low salt diet  3) If you have any issues with trouble urinating, blood in the urine, food tasting like metal, confusion, weakness, fatigue that is worsening, please call right away. 4) Please continue to follow regularly with your family physician and any other specialist that you are advised to see and continue to follow their recommendations  5) labwork in 4-5 months  6) appointment in 5 months  7) no changes to your medications today but you can lower gabapentin to 1 tablet nightly and see if this helps the fatigue/foggy feeling. Please send Blood pressure readings in 2 weeks. If you continue to feel fatigue after lowering gabapentin and your BP remains low then will consider lowering amlodipine        OBJECTIVE:  Current Weight: Weight - Scale: 71.7 kg (158 lb)  Vitals:    07/18/23 1020   BP: 108/58   BP Location: Left arm   Patient Position: Sitting   Cuff Size: Standard   Pulse: 91   Weight: 71.7 kg (158 lb)   Height: 5' 1" (1.549 m)    Body mass index is 29.85 kg/m². REVIEW OF SYSTEMS:    Review of Systems   Constitutional: Positive for fatigue. HENT: Negative. Eyes: Negative. Respiratory: Negative. Negative for shortness of breath. Cardiovascular: Negative. Negative for leg swelling. Gastrointestinal: Negative. Endocrine: Negative. Genitourinary: Negative. Negative for difficulty urinating. Musculoskeletal: Negative. Skin: Negative. Allergic/Immunologic: Negative. Neurological: Negative.     Hematological: Negative. Psychiatric/Behavioral: Negative. All other systems reviewed and are negative. PHYSICAL EXAM:      Physical Exam  Vitals and nursing note reviewed. Constitutional:       General: She is not in acute distress. Appearance: She is well-developed. She is not diaphoretic. HENT:      Head: Normocephalic and atraumatic. Eyes:      General: No scleral icterus. Right eye: No discharge. Left eye: No discharge. Conjunctiva/sclera: Conjunctivae normal.   Neck:      Vascular: No JVD. Cardiovascular:      Rate and Rhythm: Normal rate and regular rhythm. Heart sounds: No murmur heard. No friction rub. No gallop. Pulmonary:      Effort: Pulmonary effort is normal. No respiratory distress. Breath sounds: Normal breath sounds. No wheezing or rales. Abdominal:      General: Bowel sounds are normal. There is no distension. Palpations: Abdomen is soft. Tenderness: There is no abdominal tenderness. There is no rebound. Musculoskeletal:         General: No tenderness or deformity. Normal range of motion. Cervical back: Normal range of motion and neck supple. Comments: Trace unchanged lower extremity edema bilaterally   Skin:     General: Skin is warm and dry. Coloration: Skin is not pale. Findings: No erythema or rash. Neurological:      Mental Status: She is alert and oriented to person, place, and time. Coordination: Coordination normal.   Psychiatric:         Behavior: Behavior normal.         Thought Content:  Thought content normal.         Judgment: Judgment normal.         Medications:    Current Outpatient Medications:   •  amLODIPine (NORVASC) 10 mg tablet, Take 0.5 tablets (5 mg total) by mouth daily, Disp: 90 tablet, Rfl: 0  •  apixaban (Eliquis) 2.5 mg, Take 1 tablet (2.5 mg total) by mouth 2 (two) times a day, Disp: 180 tablet, Rfl: 3  •  atorvastatin (LIPITOR) 40 mg tablet, Take 1 tablet (40 mg total) by mouth every evening, Disp: 90 tablet, Rfl: 1  •  bumetanide (BUMEX) 2 mg tablet, Take 1 tablet (2 mg total) by mouth daily, Disp: 90 tablet, Rfl: 1  •  Diclofenac Sodium (VOLTAREN) 1 %, Apply 2 g topically in the morning APPLY NO MORE THAN 3 DAYS IN A ROW THEN TAKE A BREAK FOR ONE WEEK FROM USE., Disp: 100 g, Rfl: 1  •  gabapentin (NEURONTIN) 100 mg capsule, Take 1 tablet at bedtime.   May increase to 2 tablets after 3 days, Disp: 90 capsule, Rfl: 1  •  levothyroxine 88 mcg tablet, Take 1 tablet (88 mcg total) by mouth daily, Disp: 90 tablet, Rfl: 1  •  losartan (COZAAR) 25 mg tablet, TAKE 2 TABLETS IN THE MORNING AND TAKE 1 TABLET IN THE EVENING, Disp: 270 tablet, Rfl: 3  •  metoprolol tartrate (LOPRESSOR) 25 mg tablet, Take 1 tablet (25 mg total) by mouth every 12 (twelve) hours, Disp: 180 tablet, Rfl: 0  •  pantoprazole (PROTONIX) 20 mg tablet, Take 1 tablet (20 mg total) by mouth daily, Disp: 90 tablet, Rfl: 1  •  senna-docusate sodium (SENOKOT S) 8.6-50 mg per tablet, Take 1 tablet by mouth daily, Disp: 20 tablet, Rfl: 1  •  spironolactone (ALDACTONE) 25 mg tablet, Take 0.5 tablets (12.5 mg total) by mouth daily, Disp: 45 tablet, Rfl: 1  •  ketoconazole (NIZORAL) 2 % cream, Apply topically daily, Disp: 60 g, Rfl: 1    Laboratory Results:        Invalid input(s): "ALBUMIN"    Results for orders placed or performed in visit on 06/29/23   Comprehensive metabolic panel   Result Value Ref Range    Sodium 142 135 - 147 mmol/L    Potassium 4.0 3.5 - 5.3 mmol/L    Chloride 110 (H) 96 - 108 mmol/L    CO2 29 21 - 32 mmol/L    ANION GAP 3 mmol/L    BUN 40 (H) 5 - 25 mg/dL    Creatinine 1.08 0.60 - 1.30 mg/dL    Glucose, Fasting 91 65 - 99 mg/dL    Calcium 9.6 8.3 - 10.1 mg/dL    Corrected Calcium 10.1 8.3 - 10.1 mg/dL    AST 14 5 - 45 U/L    ALT 24 12 - 78 U/L    Alkaline Phosphatase 88 46 - 116 U/L    Total Protein 6.9 6.4 - 8.4 g/dL    Albumin 3.4 (L) 3.5 - 5.0 g/dL    Total Bilirubin 0.50 0.20 - 1.00 mg/dL    eGFR 47 ml/min/1.73sq m   Lipid panel   Result Value Ref Range    Cholesterol 128 See Comment mg/dL    Triglycerides 80 See Comment mg/dL    HDL, Direct 59 >=50 mg/dL    LDL Calculated 53 0 - 100 mg/dL    Non-HDL-Chol (CHOL-HDL) 69 mg/dl   TSH, 3rd generation with Free T4 reflex   Result Value Ref Range    TSH 3RD GENERATON 2.292 0.450 - 4.500 uIU/mL   CBC   Result Value Ref Range    WBC 6.87 4.31 - 10.16 Thousand/uL    RBC 4.21 3.81 - 5.12 Million/uL    Hemoglobin 12.3 11.5 - 15.4 g/dL    Hematocrit 39.4 34.8 - 46.1 %    MCV 94 82 - 98 fL    MCH 29.2 26.8 - 34.3 pg    MCHC 31.2 (L) 31.4 - 37.4 g/dL    RDW 14.5 11.6 - 15.1 %    Platelets 809 253 - 242 Thousands/uL    MPV 12.0 8.9 - 12.7 fL   Magnesium   Result Value Ref Range    Magnesium 2.1 1.6 - 2.6 mg/dL   Phosphorus   Result Value Ref Range    Phosphorus 3.9 2.3 - 4.1 mg/dL   Protein / creatinine ratio, urine   Result Value Ref Range    Creatinine, Ur 52.0 mg/dL    Protein Urine Random <6 mg/dL    Prot/Creat Ratio, Ur     Urinalysis with microscopic   Result Value Ref Range    Color, UA Colorless     Clarity, UA Clear     Specific Gravity, UA 1.014 1.003 - 1.030    pH, UA 6.0 4.5, 5.0, 5.5, 6.0, 6.5, 7.0, 7.5, 8.0    Leukocytes, UA Negative Negative    Nitrite, UA Negative Negative    Protein, UA Negative Negative mg/dl    Glucose, UA Negative Negative mg/dl    Ketones, UA Negative Negative mg/dl    Urobilinogen, UA <2.0 <2.0 mg/dl mg/dl    Bilirubin, UA Negative Negative    Occult Blood, UA Negative Negative    RBC, UA 1-2 None Seen, 1-2 /hpf    WBC, UA 1-2 None Seen, 1-2 /hpf    Epithelial Cells None Seen None Seen, Occasional /hpf    Bacteria, UA None Seen None Seen, Occasional /hpf

## 2023-07-18 NOTE — LETTER
July 18, 2023     Andre Khanna MD  [address] [facility] [place] [address]    Patient: Krysta Hanks   YOB: 1940   Date of Visit: 7/18/2023       Dear Dr. Kayleen Patterson:    Thank you for referring Maria Elena Padilla to me for evaluation. Below are my notes for this consultation. If you have questions, please do not hesitate to call me. I look forward to following your patient along with you. Sincerely,        Noreen Senior MD        CC: No Recipients    Noreen Senior MD  7/18/2023 11:21 AM  Sign when Signing Visit  NEPHROLOGY OFFICE VISIT   Krysta Hanks 79 y.o. female MRN: [id number]  7/18/2023    Reason for Visit: CKD III    ASSESSMENT and PLAN:    I had the pleasure of seeing Ms Monica Vivas today in the renal clinic for the continued management of CKD III.     2/2020 - February with 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 on February 11th. Completed course of Keppra for seizure prophylaxis. 3/30/2020 - losartan was increased to 50 mg twice a day from 50 in the morning and 25 in the evening due to high blood pressures     4/3/2020 - spironolactone 12.5 mg daily was started due to continued high blood pressure     4/2020 - patient had presented for replacement current of cranioplasty on April 6. And subsequently was admitted to the rehab center postoperatively      March 2021- patient was admitted to hospital with not feeling well and   Headache. CT scan of the head with no acute intracranial pathology. The other symptoms of memory loss were also present. Patient was referred to geriatrician. April 2021 - sciatic pain. Received brief steroid course     5/2021 - back pain. Went to ER. Given pred taper    5/2023 - neck pain. CT of spine -  no cervical spine fracture or traumatic malalignment     79-year-old female with a past medical history of chronic kidney disease, venous stasis, HTN, hypothyroidism, lumbar canal stenosis, Anxiety, GERD, WENDI, neuropathy, Gout, EF 88-91%, Grade 2 diastolic dysfunction who presents for follow up for CKD. To note, patient's  has now passed away in August.     1) CKD III - patient also has a long standing history of HTN. Creatinine in 2013, 0.9 mg/dL. Cr early 2016 was 0.80-0.9 mg/dL; then increased starting in august 2016 to 2.3 mg/dL and has fluctuated since. CT scan in 2016, kidneys appearing normal at that time. Etiology of CKD may be long standing HTN and ATN. Baseline Cr ~ 1.1 -1.3 mg/dL     - Urine eos 0%;   - A1c controlled prior  at 5.6% 12/2022  - UPCR too low to quant (6/2023)  - UA bland 6/2023  -SPEP unrevealing  -UPEP unrevealing  - C3, C4 unrevealing  - Renal u/s with R 10.3 cm, L 10.4 cm, renal cortex 1 cm b/l; both kidneys slightly reduced in size; lower pole of R kidney is non obstructing calculus  - Pt follows with Urology team for nephrolithiasis. - No NSAIDs, the patient is not currently taking NSAID  -nuc stress test per Cardiology in Jan 2019 unrevealing  - repeat renal u/s 1/2019 - unrevealing with exception of renal cortical atrophy; but also 6 mm non shadowing calculus. Overall, patient's creatinine is 1.08 mg/dL. Electrolytes are stable and at goal hemoglobin at goal 12.3. Urinalysis is bland. Plan:     - no changes today  - labs in 5 to 6 months with appointment in 6 months  -cont metoprolol 25 mg twice daily  - cont amlodipine 5 mg daily, spironolactone half tab daily  -Overall, patient's blood pressure is lower on the lower end of normal but she is asymptomatic with the exception of fatigue. Fatigue may be related to gabapentin. The patient feels that she notices the symptoms worse after taking gabapentin. I advised that she lowers gabapentin dose to 100 mg nightly from 200 mg nightly. I reiterated with the patient and her daughter that we cannot discontinue currently. They will check blood pressures for 1 to 2 weeks and send log.   If blood pressure still remains marginal, we could consider decreasing amlodipine. Daughter and patient are in agreement with plan. - Given severe neck pain the patient has gone through the past couple of months. I did explain that it is okay to use Voltaren gel very briefly. 2) SOB - Volume - follows with Cardiology     - on Bumetanide and is euvolemic on July 2023 appointment     3) HTN -      - cont losartan, bumex and amlodipine, spironolactone  -  due to bradycardia, beta-blocker was held prior but given new onset atrial fibrillation around May 2022, patient was restarted on metoprolol per Cardiology team  - no changes today. See above. 4) hypothyroid - as per Primary Care Physician     5) HPL - Primary Care following. 6) Electrolytes - stable     7) MBD -     - Vit D 50.7 in nov 2019 --> 40.8 March 2022  - PTH improved 89 in nov 2019 --> 78.6 in June 2020 --> 88 3/8/2022 --> 136 December 2022     8) gout -      -monitor for flare     9) back pain - follows with Pain management     - pain sig improved with inj     10) Colonoscopy in feb with internal hemorrhoids and polyp removed. 11) alk phos elevation     -improved     12) Anemia - Hb stable     -hemoglobin stable and at goal     13) Mitral valve calcification     - moderate to severe mitral regurgitation  -eventually may require further evaluation. Being monitored for now conservatively. - per Cardiology team     14) elevated D-dimer prior-patient was given duplex of lower extremities which was unrevealing prior     15) knee pain after fall in august 2020     - saw Orthopedic team  - steroid injection was given     16) intracranial hemorrhage with mass effect requiring craniotomy on 2/11/2020     - now is status post replacement of cranioplasty in April 2020     17)  Anxiety- patient was started on Cymbalta.  PCP is attempting off of gabapentin     - there were periods of not taking cymbalta     18)   Zoster infection in November 2020-treated with Valtrex per primary team     19) a fib - new onset in May 2022     -was advised to have Holter monitor. There is concern for tachy-hola syndrome initially and Holter monitor showed periods of atrial fibrillation with RVR and nonsustained ventricular tachycardia. Was started on low-dose beta-blocker with metoprolol.  -in July metoprolol was titrated further to 25 mg twice a day  -was started on Eliquis 2.5 mg twice a day. Lower dose was chosen due to high risk factors. It was a pleasure evaluating your patient. Thank you for allowing our team to participate in the care of Ms Aryan Wharton. Please do not hesitate to contact our team if further issues/questions shall arise in the interim       No problem-specific Assessment & Plan notes found for this encounter. HPI:    Denies complaints. With the exception of fatigue. No fevers, chills, nausea, vomiting. PATIENT INSTRUCTIONS:    Patient Instructions   1) Avoid NSAIDS - (Example - motrin, advil, ibuprofen, aleve, exederin, etc)  2) Always follow a low salt diet  3) If you have any issues with trouble urinating, blood in the urine, food tasting like metal, confusion, weakness, fatigue that is worsening, please call right away. 4) Please continue to follow regularly with your family physician and any other specialist that you are advised to see and continue to follow their recommendations  5) labwork in 4-5 months  6) appointment in 5 months  7) no changes to your medications today but you can lower gabapentin to 1 tablet nightly and see if this helps the fatigue/foggy feeling. Please send Blood pressure readings in 2 weeks.  If you continue to feel fatigue after lowering gabapentin and your BP remains low then will consider lowering amlodipine        OBJECTIVE:  Current Weight: Weight - Scale: 71.7 kg (158 lb)  Vitals:    07/18/23 1020   BP: 108/58   BP Location: Left arm   Patient Position: Sitting   Cuff Size: Standard   Pulse: 91   Weight: 71.7 kg (158 lb)   Height: 5' 1" (1.549 m)    Body mass index is 29.85 kg/m². REVIEW OF SYSTEMS:    Review of Systems   Constitutional: Positive for fatigue. HENT: Negative. Eyes: Negative. Respiratory: Negative. Negative for shortness of breath. Cardiovascular: Negative. Negative for leg swelling. Gastrointestinal: Negative. Endocrine: Negative. Genitourinary: Negative. Negative for difficulty urinating. Musculoskeletal: Negative. Skin: Negative. Allergic/Immunologic: Negative. Neurological: Negative. Hematological: Negative. Psychiatric/Behavioral: Negative. All other systems reviewed and are negative. PHYSICAL EXAM:      Physical Exam  Vitals and nursing note reviewed. Constitutional:       General: She is not in acute distress. Appearance: She is well-developed. She is not diaphoretic. HENT:      Head: Normocephalic and atraumatic. Eyes:      General: No scleral icterus. Right eye: No discharge. Left eye: No discharge. Conjunctiva/sclera: Conjunctivae normal.   Neck:      Vascular: No JVD. Cardiovascular:      Rate and Rhythm: Normal rate and regular rhythm. Heart sounds: No murmur heard. No friction rub. No gallop. Pulmonary:      Effort: Pulmonary effort is normal. No respiratory distress. Breath sounds: Normal breath sounds. No wheezing or rales. Abdominal:      General: Bowel sounds are normal. There is no distension. Palpations: Abdomen is soft. Tenderness: There is no abdominal tenderness. There is no rebound. Musculoskeletal:         General: No tenderness or deformity. Normal range of motion. Cervical back: Normal range of motion and neck supple. Comments: Trace unchanged lower extremity edema bilaterally   Skin:     General: Skin is warm and dry. Coloration: Skin is not pale. Findings: No erythema or rash.    Neurological:      Mental Status: She is alert and oriented to person, place, and time. Coordination: Coordination normal.   Psychiatric:         Behavior: Behavior normal.         Thought Content: Thought content normal.         Judgment: Judgment normal.         Medications:    Current Outpatient Medications:   •  amLODIPine (NORVASC) 10 mg tablet, Take 0.5 tablets (5 mg total) by mouth daily, Disp: 90 tablet, Rfl: 0  •  apixaban (Eliquis) 2.5 mg, Take 1 tablet (2.5 mg total) by mouth 2 (two) times a day, Disp: 180 tablet, Rfl: 3  •  atorvastatin (LIPITOR) 40 mg tablet, Take 1 tablet (40 mg total) by mouth every evening, Disp: 90 tablet, Rfl: 1  •  bumetanide (BUMEX) 2 mg tablet, Take 1 tablet (2 mg total) by mouth daily, Disp: 90 tablet, Rfl: 1  •  Diclofenac Sodium (VOLTAREN) 1 %, Apply 2 g topically in the morning APPLY NO MORE THAN 3 DAYS IN A ROW THEN TAKE A BREAK FOR ONE WEEK FROM USE., Disp: 100 g, Rfl: 1  •  gabapentin (NEURONTIN) 100 mg capsule, Take 1 tablet at bedtime.   May increase to 2 tablets after 3 days, Disp: 90 capsule, Rfl: 1  •  levothyroxine 88 mcg tablet, Take 1 tablet (88 mcg total) by mouth daily, Disp: 90 tablet, Rfl: 1  •  losartan (COZAAR) 25 mg tablet, TAKE 2 TABLETS IN THE MORNING AND TAKE 1 TABLET IN THE EVENING, Disp: 270 tablet, Rfl: 3  •  metoprolol tartrate (LOPRESSOR) 25 mg tablet, Take 1 tablet (25 mg total) by mouth every 12 (twelve) hours, Disp: 180 tablet, Rfl: 0  •  pantoprazole (PROTONIX) 20 mg tablet, Take 1 tablet (20 mg total) by mouth daily, Disp: 90 tablet, Rfl: 1  •  senna-docusate sodium (SENOKOT S) 8.6-50 mg per tablet, Take 1 tablet by mouth daily, Disp: 20 tablet, Rfl: 1  •  spironolactone (ALDACTONE) 25 mg tablet, Take 0.5 tablets (12.5 mg total) by mouth daily, Disp: 45 tablet, Rfl: 1  •  ketoconazole (NIZORAL) 2 % cream, Apply topically daily, Disp: 60 g, Rfl: 1    Laboratory Results:        Invalid input(s): "ALBUMIN"    Results for orders placed or performed in visit on 06/29/23   Comprehensive metabolic panel Result Value Ref Range    Sodium 142 135 - 147 mmol/L    Potassium 4.0 3.5 - 5.3 mmol/L    Chloride 110 (H) 96 - 108 mmol/L    CO2 29 21 - 32 mmol/L    ANION GAP 3 mmol/L    BUN 40 (H) 5 - 25 mg/dL    Creatinine 1.08 0.60 - 1.30 mg/dL    Glucose, Fasting 91 65 - 99 mg/dL    Calcium 9.6 8.3 - 10.1 mg/dL    Corrected Calcium 10.1 8.3 - 10.1 mg/dL    AST 14 5 - 45 U/L    ALT 24 12 - 78 U/L    Alkaline Phosphatase 88 46 - 116 U/L    Total Protein 6.9 6.4 - 8.4 g/dL    Albumin 3.4 (L) 3.5 - 5.0 g/dL    Total Bilirubin 0.50 0.20 - 1.00 mg/dL    eGFR 47 ml/min/1.73sq m   Lipid panel   Result Value Ref Range    Cholesterol 128 See Comment mg/dL    Triglycerides 80 See Comment mg/dL    HDL, Direct 59 >=50 mg/dL    LDL Calculated 53 0 - 100 mg/dL    Non-HDL-Chol (CHOL-HDL) 69 mg/dl   TSH, 3rd generation with Free T4 reflex   Result Value Ref Range    TSH 3RD GENERATON 2.292 0.450 - 4.500 uIU/mL   CBC   Result Value Ref Range    WBC 6.87 4.31 - 10.16 Thousand/uL    RBC 4.21 3.81 - 5.12 Million/uL    Hemoglobin 12.3 11.5 - 15.4 g/dL    Hematocrit 39.4 34.8 - 46.1 %    MCV 94 82 - 98 fL    MCH 29.2 26.8 - 34.3 pg    MCHC 31.2 (L) 31.4 - 37.4 g/dL    RDW 14.5 11.6 - 15.1 %    Platelets 296 595 - 446 Thousands/uL    MPV 12.0 8.9 - 12.7 fL   Magnesium   Result Value Ref Range    Magnesium 2.1 1.6 - 2.6 mg/dL   Phosphorus   Result Value Ref Range    Phosphorus 3.9 2.3 - 4.1 mg/dL   Protein / creatinine ratio, urine   Result Value Ref Range    Creatinine, Ur 52.0 mg/dL    Protein Urine Random <6 mg/dL    Prot/Creat Ratio, Ur     Urinalysis with microscopic   Result Value Ref Range    Color, UA Colorless     Clarity, UA Clear     Specific Gravity, UA 1.014 1.003 - 1.030    pH, UA 6.0 4.5, 5.0, 5.5, 6.0, 6.5, 7.0, 7.5, 8.0    Leukocytes, UA Negative Negative    Nitrite, UA Negative Negative    Protein, UA Negative Negative mg/dl    Glucose, UA Negative Negative mg/dl    Ketones, UA Negative Negative mg/dl    Urobilinogen, UA <2.0 <2.0 mg/dl mg/dl    Bilirubin, UA Negative Negative    Occult Blood, UA Negative Negative    RBC, UA 1-2 None Seen, 1-2 /hpf    WBC, UA 1-2 None Seen, 1-2 /hpf    Epithelial Cells None Seen None Seen, Occasional /hpf    Bacteria, UA None Seen None Seen, Occasional /hpf

## 2023-07-18 NOTE — PATIENT INSTRUCTIONS
1) Avoid NSAIDS - (Example - motrin, advil, ibuprofen, aleve, exederin, etc)  2) Always follow a low salt diet  3) If you have any issues with trouble urinating, blood in the urine, food tasting like metal, confusion, weakness, fatigue that is worsening, please call right away. 4) Please continue to follow regularly with your family physician and any other specialist that you are advised to see and continue to follow their recommendations  5) labwork in 4-5 months  6) appointment in 5 months  7) no changes to your medications today but you can lower gabapentin to 1 tablet nightly and see if this helps the fatigue/foggy feeling. Please send Blood pressure readings in 2 weeks.  If you continue to feel fatigue after lowering gabapentin and your BP remains low then will consider lowering amlodipine

## 2023-08-04 ENCOUNTER — OFFICE VISIT (OUTPATIENT)
Dept: PODIATRY | Facility: CLINIC | Age: 83
End: 2023-08-04
Payer: MEDICARE

## 2023-08-04 VITALS
WEIGHT: 158 LBS | RESPIRATION RATE: 16 BRPM | HEIGHT: 61 IN | SYSTOLIC BLOOD PRESSURE: 142 MMHG | DIASTOLIC BLOOD PRESSURE: 88 MMHG | BODY MASS INDEX: 29.83 KG/M2

## 2023-08-04 DIAGNOSIS — M54.16 RADICULOPATHY OF LUMBAR REGION: ICD-10-CM

## 2023-08-04 DIAGNOSIS — M25.572 PAIN IN JOINTS OF BOTH FEET: Primary | ICD-10-CM

## 2023-08-04 DIAGNOSIS — B35.1 ONYCHOMYCOSIS: ICD-10-CM

## 2023-08-04 DIAGNOSIS — M25.571 PAIN IN JOINTS OF BOTH FEET: Primary | ICD-10-CM

## 2023-08-04 DIAGNOSIS — M77.41 METATARSALGIA OF BOTH FEET: ICD-10-CM

## 2023-08-04 DIAGNOSIS — M77.42 METATARSALGIA OF BOTH FEET: ICD-10-CM

## 2023-08-04 PROCEDURE — 99213 OFFICE O/P EST LOW 20 MIN: CPT | Performed by: PODIATRIST

## 2023-08-04 RX ORDER — DULOXETIN HYDROCHLORIDE 30 MG/1
30 CAPSULE, DELAYED RELEASE ORAL DAILY
Qty: 30 CAPSULE | Refills: 1 | Status: SHIPPED | OUTPATIENT
Start: 2023-08-04 | End: 2023-09-03

## 2023-08-04 NOTE — PROGRESS NOTES
Assessment/Plan: Metatarsalgia secondary to arthralgia foot bilateral.  Acquired deformity foot. Pain upon ambulation. Radiculopathy. Chronic edema. Mycosis of nail. Plan. Chart reviewed. Patient examined. Patient advised on condition. Patient is no longer taking gabapentin so therefore we will try Cymbalta. This has been ordered. Today all nails debrided without pain or complication. Patient elevate feet at end of day. Follow-up primary care for control of edema. Patient advised on aftercare instruction. Diagnoses and all orders for this visit:    Pain in joints of both feet    Metatarsalgia of both feet  -     DULoxetine (Cymbalta) 30 mg delayed release capsule; Take 1 capsule (30 mg total) by mouth daily    Radiculopathy of lumbar region  -     DULoxetine (Cymbalta) 30 mg delayed release capsule; Take 1 capsule (30 mg total) by mouth daily    Onychomycosis          Subjective: Patient gets pain. She gets pain in her back legs and feet. She is no longer taking gabapentin. No Known Allergies      Current Outpatient Medications:   •  DULoxetine (Cymbalta) 30 mg delayed release capsule, Take 1 capsule (30 mg total) by mouth daily, Disp: 30 capsule, Rfl: 1  •  amLODIPine (NORVASC) 10 mg tablet, Take 0.5 tablets (5 mg total) by mouth daily, Disp: 90 tablet, Rfl: 0  •  apixaban (Eliquis) 2.5 mg, Take 1 tablet (2.5 mg total) by mouth 2 (two) times a day, Disp: 180 tablet, Rfl: 3  •  atorvastatin (LIPITOR) 40 mg tablet, Take 1 tablet (40 mg total) by mouth every evening, Disp: 90 tablet, Rfl: 1  •  bumetanide (BUMEX) 2 mg tablet, Take 1 tablet (2 mg total) by mouth daily, Disp: 90 tablet, Rfl: 1  •  Diclofenac Sodium (VOLTAREN) 1 %, Apply 2 g topically in the morning APPLY NO MORE THAN 3 DAYS IN A ROW THEN TAKE A BREAK FOR ONE WEEK FROM USE., Disp: 100 g, Rfl: 1  •  gabapentin (NEURONTIN) 100 mg capsule, Take 1 tablet at bedtime.   May increase to 2 tablets after 3 days, Disp: 90 capsule, Rfl: 1  •  ketoconazole (NIZORAL) 2 % cream, Apply topically daily, Disp: 60 g, Rfl: 1  •  levothyroxine 88 mcg tablet, Take 1 tablet (88 mcg total) by mouth daily, Disp: 90 tablet, Rfl: 1  •  losartan (COZAAR) 25 mg tablet, TAKE 2 TABLETS IN THE MORNING AND TAKE 1 TABLET IN THE EVENING, Disp: 270 tablet, Rfl: 3  •  metoprolol tartrate (LOPRESSOR) 25 mg tablet, Take 1 tablet (25 mg total) by mouth every 12 (twelve) hours, Disp: 180 tablet, Rfl: 0  •  pantoprazole (PROTONIX) 20 mg tablet, Take 1 tablet (20 mg total) by mouth daily, Disp: 90 tablet, Rfl: 1  •  senna-docusate sodium (SENOKOT S) 8.6-50 mg per tablet, Take 1 tablet by mouth daily, Disp: 20 tablet, Rfl: 1  •  spironolactone (ALDACTONE) 25 mg tablet, Take 0.5 tablets (12.5 mg total) by mouth daily, Disp: 45 tablet, Rfl: 1    Patient Active Problem List   Diagnosis   • Benign essential hypertension   • Chronic diastolic (congestive) heart failure (HCC)   • Hypothyroidism   • Arthropathy of knee   • Hallux abductovalgus with bunions, unspecified laterality   • CKD (chronic kidney disease), stage III (HCC)   • Diverticulitis of colon   • Chronic gout without tophus   • Hiatal hernia   • Hyperlipemia   • Hyperuricemia   • Nephrolithiasis   • WENDI (obstructive sleep apnea)   • Pseudogout of left wrist   • Gastroesophageal reflux disease without esophagitis   • Exertional shortness of breath   • Medicare annual wellness visit, subsequent   • Prediabetes   • Status post right frontotemporal replacement cranioplasty   • BMI 38.0-38.9,adult   • Sciatica of left side   • Spinal stenosis of lumbar region with neurogenic claudication   • Cervical radiculopathy   • Paroxysmal atrial fibrillation (HCC)   • PAC (premature atrial contraction)          Patient ID: Surinder Mendoza is a 79 y.o. female.     HPI    The following portions of the patient's history were reviewed and updated as appropriate:     family history includes Arthritis in her family and mother; Coronary artery disease in her mother; Diabetes in her brother, mother, and son; Hypertension in her father and mother. reports that she has never smoked. She has never used smokeless tobacco. She reports current alcohol use. She reports that she does not use drugs. Vitals:    08/04/23 1041   BP: 142/88   Resp: 16       Review of Systems      Objective:  Patient's shoes and socks removed. Foot ExamPhysical Exam      Physical Exam   Left Foot: Appearance: Normal except as noted: excessive pronation-- and-- pes planus.    Right Foot: Appearance: Normal except as noted: excessive pronation-- and-- pes planus. Tenderness: None except the calcaneous,-- medial calcaneous-- and-- insertion of the plantar fascia.  Patient has an enlarged hallux valgus deformity with inflamed bunion. Left Ankle: Appearance: Normal except ecchymosis-- and-- swelling laterally. ROM: limited ROM in all planes    Right Ankle: ROM: limited ROM in all planes Motor: diffuse weakness.  Pain with palpation anterior lateral aspect right ankle joint mortise.    Neurological Exam: performed. Light touch was intact bilaterally. Vibratory sensation was intact bilaterally. Response to monofilament test was intact bilaterally. Deep tendon reflexes: patellar reflex present bilaterally-- and-- achilles reflex present bilaterally.    Vascular Exam: performed Dorsalis pedis pulses were 1/4 bilaterally. Posterior tibial pulses were 1/4 bilaterally. Elevation Pallor: diminished bilaterally. Capillary refill time was greater than 3 seconds bilaterally-- and-- Q9 findings bilateral. Negative digital hair noted. Positive abnormal cooling bilateral. Edema: moderate bilaterally-- and-- 6/7 pitting edema. Negative Homans sign.    Toenails: All of the toenails were elongated,-- hypertrophied,-- discolored-- and-- Mycotic with onychogryphosis. Note is made of bilateral tinea pedis in moccasin foot.  Distribution.         Socks and shoes removed, Right Foot Findings: swollen, erythematous and dry.      The sensory exam showed diminished vibratory sensation at the level of the toes. Diminished tactile sensation with monofilament testing throughout the right foot.      Socks and shoes removed, Left Foot Findings: swollen, erythematous and dry.      The sensory exam showed diminished vibratory sensation at the level of the toes. Diminished tactile sensation with monofilament testing throughout the left foot.     Capillary refills findings on the right were delayed in the toes.      Pulses:      1+ in the posterior tibialis on the right      1+ in the dorsalis pedis on the right.      Capillary refills findings on the left were delayed in the toes.      Pulses:      1+ in the posterior tibialis on the left      1+ in the dorsalis pedis on the left.      Assign Risk Category: 2: Loss of protective sensation with or without weakness, deformity, callus, pre-ulcer, or history of ulceration. High risk.    Hyperkeratosis: present on both first toes,-- present on both first sub metatarsals-- and-- Bilateral plantar moccasin tinea pedis noted.    Shoe Gear Evaluation: performed ().  Recommendation(s): SAS style.

## 2023-09-01 DIAGNOSIS — Z79.01 CURRENT USE OF LONG TERM ANTICOAGULATION: ICD-10-CM

## 2023-09-01 DIAGNOSIS — I48.0 PAROXYSMAL ATRIAL FIBRILLATION (HCC): ICD-10-CM

## 2023-09-01 DIAGNOSIS — I34.0 MODERATE TO SEVERE MITRAL REGURGITATION: ICD-10-CM

## 2023-09-01 DIAGNOSIS — I49.1 PAC (PREMATURE ATRIAL CONTRACTION): ICD-10-CM

## 2023-09-03 PROBLEM — Z00.00 MEDICARE ANNUAL WELLNESS VISIT, SUBSEQUENT: Status: RESOLVED | Noted: 2019-03-08 | Resolved: 2023-09-03

## 2023-10-02 ENCOUNTER — HOSPITAL ENCOUNTER (OUTPATIENT)
Dept: NON INVASIVE DIAGNOSTICS | Facility: HOSPITAL | Age: 83
Discharge: HOME | End: 2023-10-02
Attending: INTERNAL MEDICINE
Payer: MEDICARE

## 2023-10-02 VITALS
DIASTOLIC BLOOD PRESSURE: 68 MMHG | BODY MASS INDEX: 29.83 KG/M2 | HEART RATE: 88 BPM | WEIGHT: 158 LBS | HEIGHT: 61 IN | SYSTOLIC BLOOD PRESSURE: 118 MMHG

## 2023-10-02 DIAGNOSIS — I34.0 MODERATE TO SEVERE MITRAL REGURGITATION: ICD-10-CM

## 2023-10-02 DIAGNOSIS — I50.32 CHRONIC DIASTOLIC (CONGESTIVE) HEART FAILURE (HCC): ICD-10-CM

## 2023-10-02 DIAGNOSIS — I48.91 ATRIAL FIBRILLATION BY ELECTROCARDIOGRAM (HCC): ICD-10-CM

## 2023-10-02 PROCEDURE — 93306 TTE W/DOPPLER COMPLETE: CPT

## 2023-10-02 PROCEDURE — 93306 TTE W/DOPPLER COMPLETE: CPT | Performed by: INTERNAL MEDICINE

## 2023-10-03 LAB
AORTIC ROOT: 3 CM
AORTIC VALVE MEAN VELOCITY: 11.4 M/S
APICAL FOUR CHAMBER EJECTION FRACTION: 62 %
AV AREA BY CONTINUOUS VTI: 1.2 CM2
AV AREA PEAK VELOCITY: 1 CM2
AV LVOT MEAN GRADIENT: 2 MMHG
AV LVOT PEAK GRADIENT: 3 MMHG
AV MEAN GRADIENT: 6 MMHG
AV PEAK GRADIENT: 11 MMHG
AV REGURGITATION PRESSURE HALF TIME: 319 MS
AV VALVE AREA: 1.21 CM2
AV VELOCITY RATIO: 0.49
DOP CALC AO PEAK VEL: 1.64 M/S
DOP CALC AO VTI: 35.92 CM
DOP CALC LVOT AREA: 2.01 CM2
DOP CALC LVOT CARDIAC INDEX: 2.03 L/MIN/M2
DOP CALC LVOT CARDIAC OUTPUT: 3.47 L/MIN
DOP CALC LVOT DIAMETER: 1.6 CM
DOP CALC LVOT PEAK VEL VTI: 21.66 CM
DOP CALC LVOT PEAK VEL: 0.8 M/S
DOP CALC LVOT STROKE INDEX: 25.1 ML/M2
DOP CALC LVOT STROKE VOLUME: 43.53
E WAVE DECELERATION TIME: 252 MS
FRACTIONAL SHORTENING: 30 (ref 28–44)
INTERVENTRICULAR SEPTUM IN DIASTOLE (PARASTERNAL SHORT AXIS VIEW): 1.1 CM
INTERVENTRICULAR SEPTUM: 1.1 CM (ref 0.6–1.1)
LAAS-AP2: 41 CM2
LAAS-AP4: 34 CM2
LEFT ATRIUM SIZE: 3.8 CM
LEFT ATRIUM VOLUME (MOD BIPLANE): 166 ML
LEFT INTERNAL DIMENSION IN SYSTOLE: 2.3 CM (ref 2.1–4)
LEFT VENTRICULAR INTERNAL DIMENSION IN DIASTOLE: 3.3 CM (ref 3.5–6)
LEFT VENTRICULAR POSTERIOR WALL IN END DIASTOLE: 1.1 CM
LEFT VENTRICULAR STROKE VOLUME: 27 ML
LVSV (TEICH): 27 ML
MV E'TISSUE VEL-SEP: 5 CM/S
MV PEAK A VEL: 0.01 M/S
MV PEAK E VEL: 144 CM/S
MV STENOSIS PRESSURE HALF TIME: 73 MS
MV VALVE AREA P 1/2 METHOD: 3.01
RIGHT ATRIUM AREA SYSTOLE A4C: 22.5 CM2
RIGHT VENTRICLE ID DIMENSION: 3.9 CM
SL CV AV DECELERATION TIME RETROGRADE: 1100 MS
SL CV AV PEAK GRADIENT RETROGRADE: 46 MMHG
SL CV LEFT ATRIUM LENGTH A2C: 7.5 CM
SL CV LV EF: 60
SL CV PED ECHO LEFT VENTRICLE DIASTOLIC VOLUME (MOD BIPLANE) 2D: 45 ML
SL CV PED ECHO LEFT VENTRICLE SYSTOLIC VOLUME (MOD BIPLANE) 2D: 18 ML
TR MAX PG: 56 MMHG
TR PEAK VELOCITY: 3.8 M/S
TRICUSPID ANNULAR PLANE SYSTOLIC EXCURSION: 1.7 CM
TRICUSPID VALVE PEAK REGURGITATION VELOCITY: 3.76 M/S

## 2023-10-04 ENCOUNTER — HOSPITAL ENCOUNTER (EMERGENCY)
Facility: HOSPITAL | Age: 83
Discharge: HOME | End: 2023-10-04
Attending: EMERGENCY MEDICINE
Payer: MEDICARE

## 2023-10-04 ENCOUNTER — TELEPHONE (OUTPATIENT)
Age: 83
End: 2023-10-04

## 2023-10-04 ENCOUNTER — APPOINTMENT (EMERGENCY)
Dept: CT IMAGING | Facility: HOSPITAL | Age: 83
End: 2023-10-04
Payer: MEDICARE

## 2023-10-04 ENCOUNTER — APPOINTMENT (EMERGENCY)
Dept: RADIOLOGY | Facility: HOSPITAL | Age: 83
End: 2023-10-04
Payer: MEDICARE

## 2023-10-04 VITALS
HEART RATE: 83 BPM | DIASTOLIC BLOOD PRESSURE: 55 MMHG | OXYGEN SATURATION: 98 % | BODY MASS INDEX: 29.87 KG/M2 | WEIGHT: 158.07 LBS | RESPIRATION RATE: 16 BRPM | SYSTOLIC BLOOD PRESSURE: 118 MMHG | TEMPERATURE: 98.1 F

## 2023-10-04 DIAGNOSIS — R42 DIZZINESS: Primary | ICD-10-CM

## 2023-10-04 DIAGNOSIS — R10.31 RIGHT LOWER QUADRANT ABDOMINAL PAIN: ICD-10-CM

## 2023-10-04 LAB
2HR DELTA HS TROPONIN: -2 NG/L
ALBUMIN SERPL BCP-MCNC: 4.4 G/DL (ref 3.5–5)
ALP SERPL-CCNC: 95 U/L (ref 34–104)
ALT SERPL W P-5'-P-CCNC: 12 U/L (ref 7–52)
ANION GAP SERPL CALCULATED.3IONS-SCNC: 12 MMOL/L
AST SERPL W P-5'-P-CCNC: 16 U/L (ref 13–39)
BASOPHILS # BLD AUTO: 0.03 THOUSANDS/ÂΜL (ref 0–0.1)
BASOPHILS NFR BLD AUTO: 1 % (ref 0–1)
BILIRUB SERPL-MCNC: 1 MG/DL (ref 0.2–1)
BILIRUB UR QL STRIP: NEGATIVE
BNP SERPL-MCNC: 348 PG/ML (ref 0–100)
BUN SERPL-MCNC: 41 MG/DL (ref 5–25)
CALCIUM SERPL-MCNC: 9.9 MG/DL (ref 8.4–10.2)
CARDIAC TROPONIN I PNL SERPL HS: 22 NG/L
CARDIAC TROPONIN I PNL SERPL HS: 24 NG/L
CHLORIDE SERPL-SCNC: 99 MMOL/L (ref 96–108)
CLARITY UR: CLEAR
CO2 SERPL-SCNC: 28 MMOL/L (ref 21–32)
COLOR UR: ABNORMAL
CREAT SERPL-MCNC: 1.44 MG/DL (ref 0.6–1.3)
EOSINOPHIL # BLD AUTO: 0.05 THOUSAND/ÂΜL (ref 0–0.61)
EOSINOPHIL NFR BLD AUTO: 1 % (ref 0–6)
ERYTHROCYTE [DISTWIDTH] IN BLOOD BY AUTOMATED COUNT: 14.2 % (ref 11.6–15.1)
FLUAV RNA RESP QL NAA+PROBE: NEGATIVE
FLUBV RNA RESP QL NAA+PROBE: NEGATIVE
GFR SERPL CREATININE-BSD FRML MDRD: 33 ML/MIN/1.73SQ M
GLUCOSE SERPL-MCNC: 97 MG/DL (ref 65–140)
GLUCOSE UR STRIP-MCNC: NEGATIVE MG/DL
HCT VFR BLD AUTO: 40.1 % (ref 34.8–46.1)
HGB BLD-MCNC: 13.2 G/DL (ref 11.5–15.4)
HGB UR QL STRIP.AUTO: NEGATIVE
IMM GRANULOCYTES # BLD AUTO: 0.02 THOUSAND/UL (ref 0–0.2)
IMM GRANULOCYTES NFR BLD AUTO: 0 % (ref 0–2)
KETONES UR STRIP-MCNC: NEGATIVE MG/DL
LACTATE SERPL-SCNC: 1.2 MMOL/L (ref 0.5–2)
LEUKOCYTE ESTERASE UR QL STRIP: NEGATIVE
LIPASE SERPL-CCNC: 19 U/L (ref 11–82)
LYMPHOCYTES # BLD AUTO: 0.89 THOUSANDS/ÂΜL (ref 0.6–4.47)
LYMPHOCYTES NFR BLD AUTO: 14 % (ref 14–44)
MCH RBC QN AUTO: 28.3 PG (ref 26.8–34.3)
MCHC RBC AUTO-ENTMCNC: 32.9 G/DL (ref 31.4–37.4)
MCV RBC AUTO: 86 FL (ref 82–98)
MONOCYTES # BLD AUTO: 0.47 THOUSAND/ÂΜL (ref 0.17–1.22)
MONOCYTES NFR BLD AUTO: 7 % (ref 4–12)
NEUTROPHILS # BLD AUTO: 5 THOUSANDS/ÂΜL (ref 1.85–7.62)
NEUTS SEG NFR BLD AUTO: 77 % (ref 43–75)
NITRITE UR QL STRIP: NEGATIVE
NRBC BLD AUTO-RTO: 0 /100 WBCS
PH UR STRIP.AUTO: 6.5 [PH]
PLATELET # BLD AUTO: 173 THOUSANDS/UL (ref 149–390)
PMV BLD AUTO: 10.9 FL (ref 8.9–12.7)
POTASSIUM SERPL-SCNC: 3.9 MMOL/L (ref 3.5–5.3)
PROCALCITONIN SERPL-MCNC: 0.07 NG/ML
PROT SERPL-MCNC: 7.4 G/DL (ref 6.4–8.4)
PROT UR STRIP-MCNC: NEGATIVE MG/DL
RBC # BLD AUTO: 4.66 MILLION/UL (ref 3.81–5.12)
RSV RNA RESP QL NAA+PROBE: NEGATIVE
SARS-COV-2 RNA RESP QL NAA+PROBE: NEGATIVE
SODIUM SERPL-SCNC: 139 MMOL/L (ref 135–147)
SP GR UR STRIP.AUTO: <=1.005 (ref 1–1.03)
UROBILINOGEN UR QL STRIP.AUTO: 0.2 E.U./DL
WBC # BLD AUTO: 6.46 THOUSAND/UL (ref 4.31–10.16)

## 2023-10-04 PROCEDURE — 71045 X-RAY EXAM CHEST 1 VIEW: CPT

## 2023-10-04 PROCEDURE — 81003 URINALYSIS AUTO W/O SCOPE: CPT | Performed by: EMERGENCY MEDICINE

## 2023-10-04 PROCEDURE — 36415 COLL VENOUS BLD VENIPUNCTURE: CPT | Performed by: EMERGENCY MEDICINE

## 2023-10-04 PROCEDURE — G1004 CDSM NDSC: HCPCS

## 2023-10-04 PROCEDURE — 93005 ELECTROCARDIOGRAM TRACING: CPT

## 2023-10-04 PROCEDURE — 99284 EMERGENCY DEPT VISIT MOD MDM: CPT

## 2023-10-04 PROCEDURE — 83605 ASSAY OF LACTIC ACID: CPT | Performed by: EMERGENCY MEDICINE

## 2023-10-04 PROCEDURE — 84145 PROCALCITONIN (PCT): CPT | Performed by: EMERGENCY MEDICINE

## 2023-10-04 PROCEDURE — 84484 ASSAY OF TROPONIN QUANT: CPT | Performed by: EMERGENCY MEDICINE

## 2023-10-04 PROCEDURE — 83880 ASSAY OF NATRIURETIC PEPTIDE: CPT | Performed by: EMERGENCY MEDICINE

## 2023-10-04 PROCEDURE — 74177 CT ABD & PELVIS W/CONTRAST: CPT

## 2023-10-04 PROCEDURE — 70450 CT HEAD/BRAIN W/O DYE: CPT

## 2023-10-04 PROCEDURE — 96360 HYDRATION IV INFUSION INIT: CPT

## 2023-10-04 PROCEDURE — 83690 ASSAY OF LIPASE: CPT | Performed by: EMERGENCY MEDICINE

## 2023-10-04 PROCEDURE — 80053 COMPREHEN METABOLIC PANEL: CPT | Performed by: EMERGENCY MEDICINE

## 2023-10-04 PROCEDURE — 99285 EMERGENCY DEPT VISIT HI MDM: CPT | Performed by: EMERGENCY MEDICINE

## 2023-10-04 PROCEDURE — 85025 COMPLETE CBC W/AUTO DIFF WBC: CPT | Performed by: EMERGENCY MEDICINE

## 2023-10-04 PROCEDURE — 0241U HB NFCT DS VIR RESP RNA 4 TRGT: CPT | Performed by: EMERGENCY MEDICINE

## 2023-10-04 PROCEDURE — 87040 BLOOD CULTURE FOR BACTERIA: CPT | Performed by: EMERGENCY MEDICINE

## 2023-10-04 RX ADMIN — IOHEXOL 100 ML: 350 INJECTION, SOLUTION INTRAVENOUS at 13:56

## 2023-10-04 RX ADMIN — SODIUM CHLORIDE 500 ML: 0.9 INJECTION, SOLUTION INTRAVENOUS at 13:01

## 2023-10-04 NOTE — TELEPHONE ENCOUNTER
I called patient and spoke to her and her daughter in law. Rosalind advised she does not feel well at all. I advised patient to go to [facility] ER. Patient and daughter in law were in agreement to go.

## 2023-10-04 NOTE — DISCHARGE INSTRUCTIONS
Your had abnormal CT findings that should be followed-up by your primary care physician. A copy of the CT is below to make follow-up easier. CT ABDOMEN AND PELVIS WITH IV CONTRAST     INDICATION:   RLQ abdominal pain (Age >= 14y)  RLQ pain x 2 days with fatigue and low BP.     COMPARISON: CT abdomen pelvis 8/19/2016. TECHNIQUE:  CT examination of the abdomen and pelvis was performed. Multiplanar 2D reformatted images were created from the source data. This examination, like all CT scans performed in the [facility], was performed utilizing techniques to minimize radiation dose exposure, including the use of iterative reconstruction and automated exposure control. Radiation dose length   product (DLP) for this visit:  477 mGy-cm     IV Contrast:  100 mL of iohexol (OMNIPAQUE)  Enteric Contrast:  Enteric contrast was not administered. FINDINGS:     ABDOMEN     LOWER CHEST: No acute lung base findings. LIVER/BILIARY TREE:  Unremarkable. GALLBLADDER:  No calcified gallstones. No pericholecystic inflammatory change. SPLEEN:  Unremarkable. PANCREAS: Moderate parenchymal atrophy. ADRENAL GLANDS:  Unremarkable. KIDNEYS/URETERS:  Unremarkable. No hydronephrosis. STOMACH AND BOWEL: Nonspecific degree of gastric submucosal thickening may indicate gastritis. There is colonic diverticulosis without evidence of acute diverticulitis. APPENDIX: Noninflamed. ABDOMINOPELVIC CAVITY:  No ascites. No pneumoperitoneum. No lymphadenopathy. Unchanged mild central mesenteric edema/fat stranding with scattered associated subcentimeter lymph nodes with surrounding sparing surrounding fat stranding is a typical appearance for a mild mesenteric panniculitis. VESSELS:  Unremarkable for patient's age. PELVIS     REPRODUCTIVE ORGANS:  Unremarkable for patient's age. URINARY BLADDER:  Unremarkable. ABDOMINAL WALL/INGUINAL REGIONS:  Unremarkable.      OSSEOUS STRUCTURES: No acute fracture or destructive osseous lesion. Spinal degenerative changes are noted. Unchanged grade 1 anterolisthesis of L4 on L5. IMPRESSION:     No acute findings. Potential gastritis.

## 2023-10-04 NOTE — TELEPHONE ENCOUNTER
Patient's daughter, Zuleika Buckley call in to report patietn is not feeling well includes headache, shaky or jittery. Has not eaten today. Vitals taken are BP=94/71 HR=88. History of   CVA 3 years ago and afrial fibrillation. PCP has no availability today. Please follow up with care advice, orders. Thank you.

## 2023-10-05 ENCOUNTER — VBI (OUTPATIENT)
Dept: FAMILY MEDICINE CLINIC | Facility: CLINIC | Age: 83
End: 2023-10-05

## 2023-10-05 NOTE — TELEPHONE ENCOUNTER
10/05/23 10:01 AM    Patient contacted post ED visit, [facility] department spoke with patient/caregiver and outreach was successful. Patient states she feels a little better but not back to her usual self. Offered to schedule pcp appt she states she will have her daughter in law call the office. Thank you.   Rd Lozano  [facility]

## 2023-10-05 NOTE — ED PROVIDER NOTES
History  Chief Complaint   Patient presents with   • Dizziness     Pt reports dizziness x days, worse this morning. Pt also reports SOB this AM also. Patient reports that the past few days that she feels somewhat dizzy, particular with standing but sometimes also with head movement. She notes she also had some abdominal pain today. She notes she does have mild shortness of breath, however, it is consistent with her baseline and not worse than normal.  She is taking diuretics as prescribed by her cardiologist.  She denies any recent medication changes. Patient reports her blood pressure is 90/50 at home prompting ED visit. Prior to Admission Medications   Prescriptions Last Dose Informant Patient Reported? Taking? DULoxetine (Cymbalta) 30 mg delayed release capsule   No No   Sig: Take 1 capsule (30 mg total) by mouth daily   Diclofenac Sodium (VOLTAREN) 1 %   No No   Sig: Apply 2 g topically in the morning APPLY NO MORE THAN 3 DAYS IN A ROW THEN TAKE A BREAK FOR ONE WEEK FROM USE.   amLODIPine (NORVASC) 10 mg tablet  Self No No   Sig: Take 0.5 tablets (5 mg total) by mouth daily   apixaban (Eliquis) 2.5 mg  Self No No   Sig: Take 1 tablet (2.5 mg total) by mouth 2 (two) times a day   atorvastatin (LIPITOR) 40 mg tablet  Self No No   Sig: Take 1 tablet (40 mg total) by mouth every evening   bumetanide (BUMEX) 2 mg tablet  Self No No   Sig: Take 1 tablet (2 mg total) by mouth daily   gabapentin (NEURONTIN) 100 mg capsule  Self No No   Sig: Take 1 tablet at bedtime.   May increase to 2 tablets after 3 days   ketoconazole (NIZORAL) 2 % cream   No No   Sig: Apply topically daily   levothyroxine 88 mcg tablet  Self No No   Sig: Take 1 tablet (88 mcg total) by mouth daily   losartan (COZAAR) 25 mg tablet  Self No No   Sig: TAKE 2 TABLETS IN THE MORNING AND TAKE 1 TABLET IN THE EVENING   metoprolol tartrate (LOPRESSOR) 25 mg tablet   No No   Sig: Take 1 tablet (25 mg total) by mouth every 12 (twelve) hours pantoprazole (PROTONIX) 20 mg tablet  Self No No   Sig: Take 1 tablet (20 mg total) by mouth daily   senna-docusate sodium (SENOKOT S) 8.6-50 mg per tablet   No No   Sig: Take 1 tablet by mouth daily   spironolactone (ALDACTONE) 25 mg tablet  Self No No   Sig: Take 0.5 tablets (12.5 mg total) by mouth daily      Facility-Administered Medications: None       Past Medical History:   Diagnosis Date   • Anxiety    • Arthritis     joints   • Cataract    • Disease of thyroid gland    • Eczema    • GERD (gastroesophageal reflux disease)    • Gout    • Heart failure (HCC)    • Hepatitis    • Hiatal hernia    • Hypertension    • Onychomycosis     last assessed: 16   • Orthopnea     resolved: 16   • Pseudogout of left wrist    • Sleep apnea     wears CPAP   • Stroke [facility])        Past Surgical History:   Procedure Laterality Date   • ABDOMINAL SURGERY         • BRAIN HEMATOMA EVACUATION Right 2020    Procedure: Right decompressive craniectomy and evacuation of intraparenchymal hematoma; Surgeon: Sully Amador MD;  Location: [facility];  Service: Neurosurgery   • BREAST SURGERY Right     cyst removal   • CARPAL TUNNEL RELEASE Left    • CARPAL TUNNEL RELEASE Right    • CATARACT EXTRACTION     •  SECTION  1960    x1   • COLONOSCOPY N/A 2018    Procedure: COLONOSCOPY;  Surgeon: Janet Barajas MD;  Location: [address] [place] [address] GI LAB;   Service: Gastroenterology   • DILATION AND CURETTAGE OF UTERUS     • EYE SURGERY Left     cataracts   • HERNIA REPAIR      umbilical hernia   • INCISIONAL HERNIA REPAIR      incarcerated   • KNEE ARTHROSCOPY Right    • NH RPLCMT BONE FLAP/PROSTHETIC PLATE SKULL Right     Procedure: right frontotemporal replacement cranioplasty;  Surgeon: Sully Amador MD;  Location: [facility];  Service: Neurosurgery   • NH XCAPSL CTRC RMVL INSJ IO LENS PROSTH W/O ECP Right 3/27/2017    Procedure: EXTRACTION EXTRACAPSULAR CATARACT PHACO INTRAOCULAR LENS (IOL); Surgeon: Librado Anglin MD;  Location: [facility] MAIN OR;  Service: Ophthalmology       Family History   Problem Relation Age of Onset   • Diabetes Mother    • Coronary artery disease Mother    • Arthritis Mother    • Hypertension Mother    • Hypertension Father    • Diabetes Brother    • Diabetes Son    • Arthritis Family      I have reviewed and agree with the history as documented. E-Cigarette/Vaping   • E-Cigarette Use Never User      E-Cigarette/Vaping Substances   • Nicotine No    • THC No    • CBD No    • Flavoring No    • Other No    • Unknown No      Social History     Tobacco Use   • Smoking status: Never   • Smokeless tobacco: Never   Vaping Use   • Vaping Use: Never used   Substance Use Topics   • Alcohol use: Not Currently     Comment: Rare. • Drug use: Never       Review of Systems   All other systems reviewed and are negative. Physical Exam  Physical Exam  Vitals and nursing note reviewed. Constitutional:       General: She is not in acute distress. Appearance: She is well-developed. HENT:      Head: Normocephalic and atraumatic. Eyes:      Conjunctiva/sclera: Conjunctivae normal.   Cardiovascular:      Rate and Rhythm: Normal rate and regular rhythm. Heart sounds: No murmur heard. Pulmonary:      Effort: Pulmonary effort is normal. No respiratory distress. Breath sounds: Normal breath sounds. Abdominal:      Palpations: Abdomen is soft. There is no mass. Tenderness: There is abdominal tenderness (Mild right lower quadrant). There is no rebound. Hernia: No hernia is present. Musculoskeletal:         General: No swelling. Cervical back: Neck supple. Skin:     General: Skin is warm and dry. Capillary Refill: Capillary refill takes less than 2 seconds. Neurological:      General: No focal deficit present. Mental Status: She is alert and oriented to person, place, and time. Cranial Nerves: No cranial nerve deficit.       Sensory: No sensory deficit. Comments: Normal finger-nose bilaterally. Psychiatric:         Mood and Affect: Mood normal.         Vital Signs  ED Triage Vitals [10/04/23 1232]   Temperature Pulse Respirations Blood Pressure SpO2   98.1 °F (36.7 °C) 81 18 100/55 100 %      Temp Source Heart Rate Source Patient Position - Orthostatic VS BP Location FiO2 (%)   Oral Monitor -- -- --      Pain Score       No Pain           Vitals:    10/04/23 1232 10/04/23 1430   BP: 100/55 118/55   Pulse: 81 83         Visual Acuity      ED Medications  Medications   sodium chloride 0.9 % bolus 500 mL (0 mL Intravenous Stopped 10/4/23 1426)   iohexol (OMNIPAQUE) 350 MG/ML injection (SINGLE-DOSE) 100 mL (100 mL Intravenous Given 10/4/23 1356)       Diagnostic Studies  Results Reviewed     Procedure Component Value Units Date/Time    HS Troponin I 2hr [[id number]]  (Normal) Collected: 10/04/23 1443    Lab Status: Final result Specimen: Blood from Arm, Right Updated: 10/04/23 1511     hs TnI 2hr 22 ng/L      Delta 2hr hsTnI -2 ng/L     FLU/RSV/COVID - if FLU/RSV clinically relevant [[id number]]  (Normal) Collected: 10/04/23 1300    Lab Status: Final result Specimen: Nares from Nose Updated: 10/04/23 1353     SARS-CoV-2 Negative     INFLUENZA A PCR Negative     INFLUENZA B PCR Negative     RSV PCR Negative    Narrative:      FOR PEDIATRIC PATIENTS - copy/paste COVID Guidelines URL to browser: [email or web address]    SARS-CoV-2 assay is a Nucleic Acid Amplification assay intended for the  qualitative detection of nucleic acid from SARS-CoV-2 in nasopharyngeal  swabs. Results are for the presumptive identification of SARS-CoV-2 RNA. Positive results are indicative of infection with SARS-CoV-2, the virus  causing COVID-19, but do not rule out bacterial infection or co-infection  with other viruses.  Laboratories within the [facility] hospital and its  territories are required to report all positive results to the appropriate  public health authorities. Negative results do not preclude SARS-CoV-2  infection and should not be used as the sole basis for treatment or other  patient management decisions. Negative results must be combined with  clinical observations, patient history, and epidemiological information. This test has not been FDA cleared or approved. This test has been authorized by FDA under an Emergency Use Authorization  (EUA). This test is only authorized for the duration of time the  declaration that circumstances exist justifying the authorization of the  emergency use of an in vitro diagnostic tests for detection of SARS-CoV-2  virus and/or diagnosis of COVID-19 infection under section 564(b)(1) of  the Act, 21 U. S.C. 260SSN-1(F)(7), unless the authorization is terminated  or revoked sooner. The test has been validated but independent review by FDA  and CLIA is pending. Test performed using [employer]: This RT-PCR assay targets N2,  a region unique to SARS-CoV-2. A conserved region in the E-gene was chosen  for pan-Sarbecovirus detection which includes SARS-CoV-2. According to CMS-2020-01-R, this platform meets the definition of high-throughput technology.     UA w Reflex to Microscopic w Reflex to Culture [[id number]]  (Abnormal) Collected: 10/04/23 1328    Lab Status: Final result Specimen: Urine, Clean Catch Updated: 10/04/23 1343     Color, UA Straw     Clarity, UA Clear     Specific Gravity, UA <=1.005     pH, UA 6.5     Leukocytes, UA Negative     Nitrite, UA Negative     Protein, UA Negative mg/dl      Glucose, UA Negative mg/dl      Ketones, UA Negative mg/dl      Urobilinogen, UA 0.2 E.U./dl      Bilirubin, UA Negative     Occult Blood, UA Negative    Procalcitonin [[id number]]  (Normal) Collected: 10/04/23 1300    Lab Status: Final result Specimen: Blood from Arm, Right Updated: 10/04/23 1341     Procalcitonin 0.07 ng/ml     HS Troponin 0hr (reflex protocol) [[id number]]  (Normal) Collected: 10/04/23 1300    Lab Status: Final result Specimen: Blood from Arm, Right Updated: 10/04/23 1339     hs TnI 0hr 24 ng/L     B-Type Natriuretic Peptide(BNP) [[id number]]  (Abnormal) Collected: 10/04/23 1300    Lab Status: Final result Specimen: Blood from Arm, Right Updated: 10/04/23 1337      pg/mL     Lactic acid, plasma (w/reflex if result > 2.0) [[id number]]  (Normal) Collected: 10/04/23 1300    Lab Status: Final result Specimen: Blood from Arm, Right Updated: 10/04/23 1334     LACTIC ACID 1.2 mmol/L     Narrative:      Result may be elevated if tourniquet was used during collection.     Comprehensive metabolic panel [[id number]]  (Abnormal) Collected: 10/04/23 1300    Lab Status: Final result Specimen: Blood from Arm, Right Updated: 10/04/23 1333     Sodium 139 mmol/L      Potassium 3.9 mmol/L      Chloride 99 mmol/L      CO2 28 mmol/L      ANION GAP 12 mmol/L      BUN 41 mg/dL      Creatinine 1.44 mg/dL      Glucose 97 mg/dL      Calcium 9.9 mg/dL      AST 16 U/L      ALT 12 U/L      Alkaline Phosphatase 95 U/L      Total Protein 7.4 g/dL      Albumin 4.4 g/dL      Total Bilirubin 1.00 mg/dL      eGFR 33 ml/min/1.73sq m     Narrative:      [facility] guidelines for Chronic Kidney Disease (CKD):   •  Stage 1 with normal or high GFR (GFR > 90 mL/min/1.73 square meters)  •  Stage 2 Mild CKD (GFR = 60-89 mL/min/1.73 square meters)  •  Stage 3A Moderate CKD (GFR = 45-59 mL/min/1.73 square meters)  •  Stage 3B Moderate CKD (GFR = 30-44 mL/min/1.73 square meters)  •  Stage 4 Severe CKD (GFR = 15-29 mL/min/1.73 square meters)  •  Stage 5 End Stage CKD (GFR <15 mL/min/1.73 square meters)  Note: GFR calculation is accurate only with a steady state creatinine    Lipase [[id number]]  (Normal) Collected: 10/04/23 1300    Lab Status: Final result Specimen: Blood from Arm, Right Updated: 10/04/23 1333     Lipase 19 u/L     Blood culture #1 [[id number]] Collected: 10/04/23 1309    Lab Status: In process Specimen: Blood from Arm, Right Updated: 10/04/23 1318    CBC and differential [[id number]]  (Abnormal) Collected: 10/04/23 1300    Lab Status: Final result Specimen: Blood from Arm, Right Updated: 10/04/23 1313     WBC 6.46 Thousand/uL      RBC 4.66 Million/uL      Hemoglobin 13.2 g/dL      Hematocrit 40.1 %      MCV 86 fL      MCH 28.3 pg      MCHC 32.9 g/dL      RDW 14.2 %      MPV 10.9 fL      Platelets 012 Thousands/uL      nRBC 0 /100 WBCs      Neutrophils Relative 77 %      Immat GRANS % 0 %      Lymphocytes Relative 14 %      Monocytes Relative 7 %      Eosinophils Relative 1 %      Basophils Relative 1 %      Neutrophils Absolute 5.00 Thousands/µL      Immature Grans Absolute 0.02 Thousand/uL      Lymphocytes Absolute 0.89 Thousands/µL      Monocytes Absolute 0.47 Thousand/µL      Eosinophils Absolute 0.05 Thousand/µL      Basophils Absolute 0.03 Thousands/µL     Blood culture #2 [[id number]] Collected: 10/04/23 1300    Lab Status: In process Specimen: Blood from Arm, Right Updated: 10/04/23 1309                 CT abdomen pelvis with contrast   Final Result by Samantha Beebe MD (10/04 1500)      No acute findings. Potential gastritis. The study was marked in [facility] for immediate notification. Workstation performed: [id number]         CT head without contrast   Final Result by Abundio Rodarte MD (10/04 1837)      Stable exam   - No acute intracranial abnormality in postsurgical brain.   - Unchanged large chronic encephalomalacia and gliosis in right temporal lobe in a region of remote hemorrhage, unchanged prior catheter tract gliosis in right frontal lobe, status post right frontoparietotemporal craniotomy. Workstation performed: [id number]         XR chest 1 view portable   ED Interpretation by Michael De La Rosa MD (10/04 8234)   No effusion or CHF      Final Result by Tiki Mederos MD (10/04 1197)      No acute cardiopulmonary disease. Workstation performed: [id number]                    Procedures  Procedures         ED Course  ED Course as of 10/04/23 2043   Wed Oct 04, 2023   1253 EKG: AF at 80 with RBBB, artifact present, no ST el elevation, QTc 515.   1307 Patient requires gentle hydration despite hypotension at home due to moderate to severe mitral regurgitation. Will reassess after fluid bolus. 2042 On reassessment, patient feels improved. No hypotension. She was able to ambulate well in the emergency department with steady gait without difficulty. Patient strongly prefers to go home. I suspect patient had some element of being over diuresed as she responded promptly and quickly to small fluid bolus. Patient agrees to follow-up with her physicians and understands indications to return to the emergency department. SBIRT 22yo+    Flowsheet Row Most Recent Value   Initial Alcohol Screen: US AUDIT-C     1. How often do you have a drink containing alcohol? 0 Filed at: 10/04/2023 1233   2. How many drinks containing alcohol do you have on a typical day you are drinking? 0 Filed at: 10/04/2023 1233   3b. FEMALE Any Age, or MALE 65+: How often do you have 4 or more drinks on one occassion? 0 Filed at: 10/04/2023 1233   Audit-C Score 0 Filed at: 10/04/2023 1233   LUL: How many times in the past year have you. .. Used an illegal drug or used a prescription medication for non-medical reasons? Never Filed at: 10/04/2023 1233                    Medical Decision Making  I eval the patient. She does have reported low blood pressure at home and is on diuretics. Differential includes sepsis, anemia, dehydration, hyponatremia. Will obtain labs. In light of dizziness, will obtain CT especially in light of prior ICH.   Although patient did not report abdominal pain, she does have abdominal tenderness on exam and right lower quadrant so will obtain CT    Amount and/or Complexity of Data Reviewed  Labs: ordered. Radiology: ordered. ECG/medicine tests: ordered. Decision-making details documented in ED Course. Risk  Prescription drug management. Disposition  Final diagnoses:   Dizziness   Right lower quadrant abdominal pain     Time reflects when diagnosis was documented in both MDM as applicable and the Disposition within this note     Time User Action Codes Description Comment    10/4/2023  3:14 PM Nemia Yury Add [R42] Dizziness     10/4/2023  3:14 PM Nemia Yury Add [R10.31] Right lower quadrant abdominal pain       ED Disposition     ED Disposition   Discharge    Condition   Stable    Date/Time   Wed Oct 4, 2023  3:14 PM    Comment   Gregorio Wise discharge to home/self care. Follow-up Information     Follow up With Specialties Details Why [address] Elver Nova MD Family Medicine   [address] [place] [address]  [phone]            Discharge Medication List as of 10/4/2023  3:15 PM      CONTINUE these medications which have NOT CHANGED    Details   amLODIPine (NORVASC) 10 mg tablet Take 0.5 tablets (5 mg total) by mouth daily, Starting Mon 5/8/2023, Normal      apixaban (Eliquis) 2.5 mg Take 1 tablet (2.5 mg total) by mouth 2 (two) times a day, Starting Tue 7/11/2023, Normal      atorvastatin (LIPITOR) 40 mg tablet Take 1 tablet (40 mg total) by mouth every evening, Starting Wed 7/5/2023, Normal      bumetanide (BUMEX) 2 mg tablet Take 1 tablet (2 mg total) by mouth daily, Starting Tue 5/9/2023, Normal      Diclofenac Sodium (VOLTAREN) 1 % Apply 2 g topically in the morning APPLY NO MORE THAN 3 DAYS IN A ROW THEN TAKE A BREAK FOR ONE WEEK FROM USE., Starting Tue 7/18/2023, Normal      DULoxetine (Cymbalta) 30 mg delayed release capsule Take 1 capsule (30 mg total) by mouth daily, Starting Fri 8/4/2023, Until Sun 9/3/2023, Normal      gabapentin (NEURONTIN) 100 mg capsule Take 1 tablet at bedtime.   May increase to 2 tablets after 3 days, Normal      ketoconazole (NIZORAL) 2 % cream Apply topically daily, Starting Fri 3/24/2023, Until Sun 4/23/2023, Normal      levothyroxine 88 mcg tablet Take 1 tablet (88 mcg total) by mouth daily, Starting Wed 7/5/2023, Normal      losartan (COZAAR) 25 mg tablet TAKE 2 TABLETS IN THE MORNING AND TAKE 1 TABLET IN THE EVENING, Normal      metoprolol tartrate (LOPRESSOR) 25 mg tablet Take 1 tablet (25 mg total) by mouth every 12 (twelve) hours, Starting Tue 9/5/2023, Normal      pantoprazole (PROTONIX) 20 mg tablet Take 1 tablet (20 mg total) by mouth daily, Starting Wed 7/5/2023, Normal      senna-docusate sodium (SENOKOT S) 8.6-50 mg per tablet Take 1 tablet by mouth daily, Starting Tue 7/18/2023, Normal      spironolactone (ALDACTONE) 25 mg tablet Take 0.5 tablets (12.5 mg total) by mouth daily, Starting Tue 5/9/2023, Normal             No discharge procedures on file.     PDMP Review       Value Time User    PDMP Reviewed  Yes 6/1/2023 11:09 AM HIEU Rios          ED Provider  Electronically Signed by           Kirk Antonio MD  10/04/23 1983

## 2023-10-06 ENCOUNTER — OFFICE VISIT (OUTPATIENT)
Age: 83
End: 2023-10-06
Payer: MEDICARE

## 2023-10-06 DIAGNOSIS — M54.16 RADICULOPATHY OF LUMBAR REGION: ICD-10-CM

## 2023-10-06 DIAGNOSIS — I70.209 PERIPHERAL ARTERIOSCLEROSIS (HCC): ICD-10-CM

## 2023-10-06 DIAGNOSIS — B35.1 ONYCHOMYCOSIS: ICD-10-CM

## 2023-10-06 DIAGNOSIS — B35.9 DERMATOPHYTOSIS: ICD-10-CM

## 2023-10-06 DIAGNOSIS — M77.41 METATARSALGIA OF BOTH FEET: Primary | ICD-10-CM

## 2023-10-06 DIAGNOSIS — M77.42 METATARSALGIA OF BOTH FEET: Primary | ICD-10-CM

## 2023-10-06 LAB
ATRIAL RATE: 100 BPM
ATRIAL RATE: 90 BPM
PR INTERVAL: 128 MS
QRS AXIS: 79 DEGREES
QRS AXIS: 83 DEGREES
QRSD INTERVAL: 108 MS
QRSD INTERVAL: 118 MS
QT INTERVAL: 404 MS
QT INTERVAL: 406 MS
QTC INTERVAL: 515 MS
QTC INTERVAL: 541 MS
T WAVE AXIS: -63 DEGREES
T WAVE AXIS: 256 DEGREES
VENTRICULAR RATE: 108 BPM
VENTRICULAR RATE: 97 BPM

## 2023-10-06 PROCEDURE — 93010 ELECTROCARDIOGRAM REPORT: CPT | Performed by: INTERNAL MEDICINE

## 2023-10-06 PROCEDURE — 99213 OFFICE O/P EST LOW 20 MIN: CPT | Performed by: PODIATRIST

## 2023-10-06 NOTE — PROGRESS NOTES
Assessment/Plan: Metatarsalgia secondary to arthralgia foot bilateral.  Acquired deformity foot. Pain upon ambulation. Radiculopathy. Chronic edema. Mycosis of nail.     Plan. Chart reviewed. Patient examined. Patient advised on condition. Patient is no longer taking gabapentin so therefore we will try Cymbalta. This has been ordered. Today all nails debrided without pain or complication. Patient elevate feet at end of day. Follow-up primary care for control of edema. Patient advised on aftercare instruction.            Diagnoses and all orders for this visit:     Pain in joints of both feet     Metatarsalgia of both feet  -     DULoxetine (Cymbalta) 30 mg delayed release capsule; Take 1 capsule (30 mg total) by mouth daily     Radiculopathy of lumbar region  -     DULoxetine (Cymbalta) 30 mg delayed release capsule; Take 1 capsule (30 mg total) by mouth daily     Onychomycosis            Subjective: Patient gets pain. She gets pain in her back legs and feet. She is no longer taking gabapentin.     No Known Allergies        Current Outpatient Medications:   •  DULoxetine (Cymbalta) 30 mg delayed release capsule, Take 1 capsule (30 mg total) by mouth daily, Disp: 30 capsule, Rfl: 1  •  amLODIPine (NORVASC) 10 mg tablet, Take 0.5 tablets (5 mg total) by mouth daily, Disp: 90 tablet, Rfl: 0  •  apixaban (Eliquis) 2.5 mg, Take 1 tablet (2.5 mg total) by mouth 2 (two) times a day, Disp: 180 tablet, Rfl: 3  •  atorvastatin (LIPITOR) 40 mg tablet, Take 1 tablet (40 mg total) by mouth every evening, Disp: 90 tablet, Rfl: 1  •  bumetanide (BUMEX) 2 mg tablet, Take 1 tablet (2 mg total) by mouth daily, Disp: 90 tablet, Rfl: 1  •  Diclofenac Sodium (VOLTAREN) 1 %, Apply 2 g topically in the morning APPLY NO MORE THAN 3 DAYS IN A ROW THEN TAKE A BREAK FOR ONE WEEK FROM USE., Disp: 100 g, Rfl: 1  •  gabapentin (NEURONTIN) 100 mg capsule, Take 1 tablet at bedtime.   May increase to 2 tablets after 3 days, Disp: 90 capsule, Rfl: 1  •  ketoconazole (NIZORAL) 2 % cream, Apply topically daily, Disp: 60 g, Rfl: 1  •  levothyroxine 88 mcg tablet, Take 1 tablet (88 mcg total) by mouth daily, Disp: 90 tablet, Rfl: 1  •  losartan (COZAAR) 25 mg tablet, TAKE 2 TABLETS IN THE MORNING AND TAKE 1 TABLET IN THE EVENING, Disp: 270 tablet, Rfl: 3  •  metoprolol tartrate (LOPRESSOR) 25 mg tablet, Take 1 tablet (25 mg total) by mouth every 12 (twelve) hours, Disp: 180 tablet, Rfl: 0  •  pantoprazole (PROTONIX) 20 mg tablet, Take 1 tablet (20 mg total) by mouth daily, Disp: 90 tablet, Rfl: 1  •  senna-docusate sodium (SENOKOT S) 8.6-50 mg per tablet, Take 1 tablet by mouth daily, Disp: 20 tablet, Rfl: 1  •  spironolactone (ALDACTONE) 25 mg tablet, Take 0.5 tablets (12.5 mg total) by mouth daily, Disp: 45 tablet, Rfl: 1         Patient Active Problem List   Diagnosis   • Benign essential hypertension   • Chronic diastolic (congestive) heart failure (HCC)   • Hypothyroidism   • Arthropathy of knee   • Hallux abductovalgus with bunions, unspecified laterality   • CKD (chronic kidney disease), stage III (HCC)   • Diverticulitis of colon   • Chronic gout without tophus   • Hiatal hernia   • Hyperlipemia   • Hyperuricemia   • Nephrolithiasis   • WENDI (obstructive sleep apnea)   • Pseudogout of left wrist   • Gastroesophageal reflux disease without esophagitis   • Exertional shortness of breath   • Medicare annual wellness visit, subsequent   • Prediabetes   • Status post right frontotemporal replacement cranioplasty   • BMI 38.0-38.9,adult   • Sciatica of left side   • Spinal stenosis of lumbar region with neurogenic claudication   • Cervical radiculopathy   • Paroxysmal atrial fibrillation (HCC)   • PAC (premature atrial contraction)             Patient ID: Toribio Carlton is a 79 y.o. female.     HPI     The following portions of the patient's history were reviewed and updated as appropriate:      family history includes Arthritis in her family and mother; Coronary artery disease in her mother; Diabetes in her brother, mother, and son; Hypertension in her father and mother.       reports that she has never smoked. She has never used smokeless tobacco. She reports current alcohol use. She reports that she does not use drugs.         Vitals:     08/04/23 1041   BP: 142/88   Resp: 16         Review of Systems       Objective:  Patient's shoes and socks removed. Foot ExamPhysical Exam       Physical Exam   Left Foot: Appearance: Normal except as noted: excessive pronation-- and-- pes planus.    Right Foot: Appearance: Normal except as noted: excessive pronation-- and-- pes planus. Tenderness: None except the calcaneous,-- medial calcaneous-- and-- insertion of the plantar fascia.  Patient has an enlarged hallux valgus deformity with inflamed bunion. Left Ankle: Appearance: Normal except ecchymosis-- and-- swelling laterally. ROM: limited ROM in all planes    Right Ankle: ROM: limited ROM in all planes Motor: diffuse weakness.  Pain with palpation anterior lateral aspect right ankle joint mortise.    Neurological Exam: performed. Light touch was intact bilaterally. Vibratory sensation was intact bilaterally. Response to monofilament test was intact bilaterally. Deep tendon reflexes: patellar reflex present bilaterally-- and-- achilles reflex present bilaterally.    Vascular Exam: performed Dorsalis pedis pulses were 1/4 bilaterally. Posterior tibial pulses were 1/4 bilaterally. Elevation Pallor: diminished bilaterally. Capillary refill time was greater than 3 seconds bilaterally-- and-- Q9 findings bilateral. Negative digital hair noted. Positive abnormal cooling bilateral. Edema: moderate bilaterally-- and-- 6/7 pitting edema. Negative Homans sign.    Toenails: All of the toenails were elongated,-- hypertrophied,-- discolored-- and-- Mycotic with onychogryphosis. Note is made of bilateral tinea pedis in moccasin foot.  Distribution.         Socks and shoes removed, Right Foot Findings: swollen, erythematous and dry.      The sensory exam showed diminished vibratory sensation at the level of the toes. Diminished tactile sensation with monofilament testing throughout the right foot.      Socks and shoes removed, Left Foot Findings: swollen, erythematous and dry.      The sensory exam showed diminished vibratory sensation at the level of the toes. Diminished tactile sensation with monofilament testing throughout the left foot.     Capillary refills findings on the right were delayed in the toes.      Pulses:      1+ in the posterior tibialis on the right      1+ in the dorsalis pedis on the right.      Capillary refills findings on the left were delayed in the toes.      Pulses:      1+ in the posterior tibialis on the left      1+ in the dorsalis pedis on the left.      Assign Risk Category: 2: Loss of protective sensation with or without weakness, deformity, callus, pre-ulcer, or history of ulceration. High risk.    Hyperkeratosis: present on both first toes,-- present on both first sub metatarsals-- and-- Bilateral plantar moccasin tinea pedis noted.    Shoe Gear Evaluation: performed ().  Recommendation(s): SAS style.

## 2023-10-08 LAB
BACTERIA BLD CULT: NORMAL
BACTERIA BLD CULT: NORMAL

## 2023-10-09 LAB
BACTERIA BLD CULT: NORMAL
BACTERIA BLD CULT: NORMAL

## 2023-10-18 DIAGNOSIS — I10 BENIGN ESSENTIAL HTN: ICD-10-CM

## 2023-10-18 RX ORDER — LOSARTAN POTASSIUM 25 MG/1
TABLET ORAL
Qty: 270 TABLET | Refills: 10 | Status: SHIPPED | OUTPATIENT
Start: 2023-10-18

## 2023-10-23 NOTE — TELEPHONE ENCOUNTER
I called her previously   Can we find out if significant change in symptoms What Is The Reason For Today's Visit?: Full Body Skin Examination What Is The Reason For Today's Visit? (Being Monitored For X): the development of new lesions

## 2023-10-27 ENCOUNTER — OFFICE VISIT (OUTPATIENT)
Dept: CARDIOLOGY CLINIC | Facility: CLINIC | Age: 83
End: 2023-10-27
Payer: MEDICARE

## 2023-10-27 VITALS
HEART RATE: 94 BPM | WEIGHT: 158 LBS | BODY MASS INDEX: 29.83 KG/M2 | DIASTOLIC BLOOD PRESSURE: 68 MMHG | HEIGHT: 61 IN | OXYGEN SATURATION: 99 % | SYSTOLIC BLOOD PRESSURE: 118 MMHG

## 2023-10-27 DIAGNOSIS — I49.1 PAC (PREMATURE ATRIAL CONTRACTION): ICD-10-CM

## 2023-10-27 DIAGNOSIS — I50.32 CHRONIC DIASTOLIC (CONGESTIVE) HEART FAILURE (HCC): ICD-10-CM

## 2023-10-27 DIAGNOSIS — I10 BENIGN ESSENTIAL HYPERTENSION: ICD-10-CM

## 2023-10-27 DIAGNOSIS — I48.91 ATRIAL FIBRILLATION BY ELECTROCARDIOGRAM (HCC): ICD-10-CM

## 2023-10-27 DIAGNOSIS — I48.0 PAROXYSMAL ATRIAL FIBRILLATION (HCC): Primary | ICD-10-CM

## 2023-10-27 DIAGNOSIS — I34.0 MODERATE TO SEVERE MITRAL REGURGITATION: ICD-10-CM

## 2023-10-27 PROCEDURE — 93000 ELECTROCARDIOGRAM COMPLETE: CPT | Performed by: INTERNAL MEDICINE

## 2023-10-27 PROCEDURE — 99214 OFFICE O/P EST MOD 30 MIN: CPT | Performed by: INTERNAL MEDICINE

## 2023-10-27 NOTE — PROGRESS NOTES
Cardiology Follow Up  [facility]  1940  [phone]  [facility] PROFESSIONAL PLAZA  [facility]  [address] [address] [id number]    1. Paroxysmal atrial fibrillation (HCC)  POCT ECG      2. Moderate to severe mitral regurgitation  POCT ECG      3. PAC (premature atrial contraction)  POCT ECG      4. Chronic diastolic (congestive) heart failure (HCC)  POCT ECG      5. Benign essential hypertension  POCT ECG         Discussion/Plan:  Atrial fibrillation persistent-  Continue metoprolol 12.5 mg twice a day. Eliquis 2.5 mg.      Moderate to severe mitral regurgitation- blood pressure optimized. Continue amlodipine 10 mg. Losartan 50 mg morning and 50mgh in evening. Bumex 2 mg daily. We discussed about tight blood pressure control. She will continue with a low-salt diet. Her blood pressures have been optimized. Possible consideration for MitraClip evaluation. IF worsens or symptoms, plan to consider mitral clip. Recheck     Intraparenchymal bleed/cranioplasty- previous history which makes her at higher risk for anticoagulation usage. Acute on chronic diastolic heart failure with a component of lymphedema- improved volume status. Lower ext better  - Weight loss  - Compression socks + wedge pillow  - Increase bumex 2mg daily +  - Low-salt diet   - Elevation of legs + walking  -- if with continued lower extremity edema consideration for compression boots    Chronic dyspnea-I believe this is multifactorial including chronic deconditioning from severe back pain. BMI of 41. Some retained lower extremity edema. Her PFTs were reviewed which also show some restriction.   She needs to started structured exercise program.  Possible swimming or water sports given her severe back discomfort     Sleep apnea  -- cpap mask titration    Incomplete rbbb    Acute on chronic kidney disease follow-up with renal  - continue cozaar +  - Bumex 2mg daily    Triglycerides- controlled  - 4000 mg omega-3 daily    WENDI- on cpap      Interval History:  She denies having chest pain. Her edema is better. Taking omega 3 currently. No bleeding or bruising. No dizziness or light-headness. Blood pressure very well controlled. Has back pain. 12/11/2018:  She reports having some exertional shortness of breath. Her lower extremity edema has improved but is persistent. She denies feeling dizziness or lightheadedness. She denies having any falls. She denies having any bleeding or bruising.    06/12/2019:  She reports some improvement in her shortness of breath but still has dyspnea while sitting. She is unable to walk secondary to severe back pain. She denies feeling dizziness or lightheadedness. Her lower extremity edema has improved. She is trying a elevator legs. She does wear compression socks. She is compliant with CPAP.    31/20: 79year-old with recent intraparenchymal hemorrhage status post right decompressive craniectomy and evacuation of intraparenchymal hematoma. She is pending follow-up bone flap by neuro surgery. She tolerated her previous procedure without evidence of decompensated heart failure. She was noted to have some bradycardia and her beta-blocker was discontinued. She is compliant with her antihypertensive medications. She is compliant with her diuretics. She denies having significant worsening in her chronic dyspnea or lower extremity edema    02/24/2021:  She denies currently feeling significant shortness of breath. She reports her lower extremity swelling is well controlled. We reviewed through her recent EKG. No prior history of atrial fibrillation. She is compliant with her medications. Denies having any falls. 06/07/2021:  Her weight has been stable. She denies having major lower extremity swelling.   We reviewed through her last echocardiogram.  Reviewed through her Holter monitor which did not show evidence of atrial fibrillation or atrial flutter. She is compliant with her CPAP. She is compliant with her diuretics. Reviewed her last lab work. 01/21/2022:  Her blood pressures have been controlled. She denies having major shortness of breath. She denies having chest heaviness. We reviewed through her recent echocardiogram.  No major change. 05/27/2022:  She had a mechanical fall 2 weeks ago. She is now seen in atrial fibrillation newly recognized. Her last event monitor from last year was negative for atrial fibrillation. She denies having major palpitations. She has chronic shortness of breath. She states her weight has been stable on her lower extremity swelling has been also stable. She has been compliant with her diuretics. She denies feeling dizziness. 06/21/2022:  She denies having recurrence of fall. She still has some exertional dyspnea. However overall she is feeling well. We reviewed her event monitor. S she is awaiting CPAP mask titration    08/04/2022:  She denies having major bleeding. She is wearing her CPAP at night. She denies having any falls. We reviewed through her event monitor. 12/19/2022: She is compliant with her CPAP. Her breathing has been stable. She denies major bleeding. She states lower extremity swelling has been stable. 4/24/2023: Denies having dizziness. Denies any major falls. No major bleeding. She is in controlled atrial fibrillation her heart rate is in the 70s. Her blood pressure is very well controlled. Denies major change in volume. Denies PND orthopnea. 10/27/2023: She is wearing her CPAP. She denies major palpitations.   Denies lower extremity swelling we reviewed her echocardiogram    Patient Active Problem List   Diagnosis    Benign essential hypertension    Chronic diastolic (congestive) heart failure (HCC)    Hypothyroidism    Arthropathy of knee    Hallux abductovalgus with bunions, unspecified laterality    CKD (chronic kidney disease), stage III ([facility])    Diverticulitis of colon    Chronic gout without tophus    Hiatal hernia    Hyperlipemia    Hyperuricemia    Nephrolithiasis    WENDI (obstructive sleep apnea)    Pseudogout of left wrist    Gastroesophageal reflux disease without esophagitis    Exertional shortness of breath    Prediabetes    Status post right frontotemporal replacement cranioplasty    BMI 38.0-38.9,adult    Sciatica of left side    Spinal stenosis of lumbar region with neurogenic claudication    Cervical radiculopathy    Paroxysmal atrial fibrillation (HCC)    PAC (premature atrial contraction)     Past Medical History:   Diagnosis Date    Anxiety     Arthritis     joints    Bunion     Cataract     Disease of thyroid gland     Eczema     GERD (gastroesophageal reflux disease)     Gout     Heart failure ([facility])     Hepatitis     Hiatal hernia     Hypertension     Onychomycosis     last assessed: 4/29/16    Orthopnea     resolved: 1/8/16    Pseudogout of left wrist     Sleep apnea     wears CPAP    Stroke [facility])      Social History     Socioeconomic History    Marital status:       Spouse name: Not on file    Number of children: Not on file    Years of education: Not on file    Highest education level: Not on file   Occupational History    Not on file   Tobacco Use    Smoking status: Never    Smokeless tobacco: Never   Vaping Use    Vaping Use: Never used   Substance and Sexual Activity    Alcohol use: Yes     Comment: Only on special occasions    Drug use: Never    Sexual activity: Not Currently     Partners: Male     Birth control/protection: Post-menopausal   Other Topics Concern    Not on file   Social History Narrative    Daily coffee consumption    Lack of exercise    Sleeps 6-7 hours a day     Social Determinants of Health     Financial Resource Strain: Low Risk  (7/5/2023)    Overall Financial Resource Strain (CARDIA)     Difficulty of Paying Living Expenses: Not hard at all   Food Insecurity: Not on file Transportation Needs: No Transportation Needs (2023)    PRAPARE - Transportation     Lack of Transportation (Medical): No     Lack of Transportation (Non-Medical): No   Physical Activity: Not on file   Stress: Not on file   Social Connections: Not on file   Intimate Partner Violence: Not on file   Housing Stability: Not on file      Family History   Problem Relation Age of Onset    Diabetes Mother     Coronary artery disease Mother     Arthritis Mother     Hypertension Mother     Hypertension Father     Diabetes Brother     Diabetes Son     Arthritis Family      Past Surgical History:   Procedure Laterality Date    ABDOMINAL SURGERY          BRAIN HEMATOMA EVACUATION Right 2020    Procedure: Right decompressive craniectomy and evacuation of intraparenchymal hematoma; Surgeon: Shelley Fitzpatrick MD;  Location: [facility];  Service: Neurosurgery    BREAST SURGERY Right     cyst removal    CARPAL TUNNEL RELEASE Left     CARPAL TUNNEL RELEASE Right     CATARACT EXTRACTION       SECTION  1960    x1    COLONOSCOPY N/A 2018    Procedure: COLONOSCOPY;  Surgeon: Nima Perdomo MD;  Location: [facility] SURG[facility] [place] [facility]; Service: Gastroenterology    DILATION AND CURETTAGE OF UTERUS  1967    EYE SURGERY Left 2006    cataracts    HERNIA REPAIR      umbilical hernia    INCISIONAL HERNIA REPAIR      incarcerated    KNEE ARTHROSCOPY Right     AL RPLCMT BONE FLAP/PROSTHETIC PLATE SKULL Right 4363    Procedure: right frontotemporal replacement cranioplasty;  Surgeon: Shelley Fitzpatrick MD;  Location: [facility];  Service: Neurosurgery    AL XCAPSL CTRC RMVL INSJ IO LENS PROSTH W/O ECP Right 3/27/2017    Procedure: EXTRACTION EXTRACAPSULAR CATARACT PHACO INTRAOCULAR LENS (IOL);   Surgeon: Jasper Moore MD;  [facility] MAIN OR;  Service: Ophthalmology       Current Outpatient Medications:     amLODIPine (NORVASC) 10 mg tablet, Take 0.5 tablets (5 mg total) by mouth daily, Disp: 90 tablet, Rfl: 0 apixaban (Eliquis) 2.5 mg, Take 1 tablet (2.5 mg total) by mouth 2 (two) times a day, Disp: 180 tablet, Rfl: 3    atorvastatin (LIPITOR) 40 mg tablet, Take 1 tablet (40 mg total) by mouth every evening, Disp: 90 tablet, Rfl: 1    bumetanide (BUMEX) 2 mg tablet, Take 1 tablet (2 mg total) by mouth daily, Disp: 90 tablet, Rfl: 1    Diclofenac Sodium (VOLTAREN) 1 %, Apply 2 g topically in the morning APPLY NO MORE THAN 3 DAYS IN A ROW THEN TAKE A BREAK FOR ONE WEEK FROM USE., Disp: 100 g, Rfl: 1    gabapentin (NEURONTIN) 100 mg capsule, Take 1 tablet at bedtime. May increase to 2 tablets after 3 days, Disp: 90 capsule, Rfl: 1    levothyroxine 88 mcg tablet, Take 1 tablet (88 mcg total) by mouth daily, Disp: 90 tablet, Rfl: 1    losartan (COZAAR) 25 mg tablet, TAKE 2 TABLETS IN THE MORNING AND TAKE 1 TABLET IN THE EVENING, Disp: 270 tablet, Rfl: 10    metoprolol tartrate (LOPRESSOR) 25 mg tablet, Take 1 tablet (25 mg total) by mouth every 12 (twelve) hours, Disp: 180 tablet, Rfl: 0    pantoprazole (PROTONIX) 20 mg tablet, Take 1 tablet (20 mg total) by mouth daily, Disp: 90 tablet, Rfl: 1    senna-docusate sodium (SENOKOT S) 8.6-50 mg per tablet, Take 1 tablet by mouth daily, Disp: 20 tablet, Rfl: 1    spironolactone (ALDACTONE) 25 mg tablet, Take 0.5 tablets (12.5 mg total) by mouth daily, Disp: 45 tablet, Rfl: 1    ketoconazole (NIZORAL) 2 % cream, Apply topically daily, Disp: 60 g, Rfl: 1  No Known Allergies    Review of Systems:  Review of Systems   Constitutional:  Positive for fatigue. Negative for activity change, appetite change, chills, diaphoresis, fever and unexpected weight change. HENT: Negative. Negative for congestion, dental problem, drooling, ear discharge, ear pain, facial swelling, hearing loss, mouth sores, nosebleeds, postnasal drip, rhinorrhea, sinus pressure, sinus pain, sneezing, sore throat, tinnitus, trouble swallowing and voice change. Eyes: Negative.   Negative for photophobia, pain, redness, itching and visual disturbance. Respiratory:  Positive for shortness of breath. Negative for apnea, cough, choking, chest tightness, wheezing and stridor. Cardiovascular:  Positive for leg swelling. Negative for chest pain and palpitations. Gastrointestinal: Negative. Negative for abdominal distention, abdominal pain, anal bleeding, blood in stool, constipation, diarrhea, nausea, rectal pain and vomiting. Endocrine: Negative. Negative for cold intolerance, heat intolerance, polydipsia, polyphagia and polyuria. Genitourinary: Negative. Negative for decreased urine volume, difficulty urinating, dyspareunia, dysuria, enuresis, flank pain, frequency, genital sores, hematuria, menstrual problem, pelvic pain, urgency, vaginal bleeding, vaginal discharge and vaginal pain. Musculoskeletal:  Positive for back pain. Negative for arthralgias, gait problem, joint swelling, myalgias, neck pain and neck stiffness. Skin: Negative. Negative for color change, pallor, rash and wound. Allergic/Immunologic: Negative. Negative for environmental allergies, food allergies and immunocompromised state. Neurological: Negative. Negative for dizziness, tremors, seizures, syncope, facial asymmetry, speech difficulty, weakness, light-headedness, numbness and headaches. Hematological:  Negative for adenopathy. Bruises/bleeds easily. Psychiatric/Behavioral: Negative. Negative for agitation, behavioral problems, confusion, decreased concentration, dysphoric mood, hallucinations, self-injury, sleep disturbance and suicidal ideas. The patient is not nervous/anxious and is not hyperactive. All other systems reviewed and are negative. Vitals:    10/27/23 1251   BP: 118/68   BP Location: Right arm   Patient Position: Sitting   Cuff Size: Standard   Pulse: 94   SpO2: 99%   Weight: 71.7 kg (158 lb)   Height: 5' 1" (1.549 m)     Physical Exam:  Physical Exam  Vitals and nursing note reviewed.    Constitutional: General: She is not in acute distress. Appearance: She is well-developed. She is not diaphoretic. HENT:      Head: Normocephalic and atraumatic. Right Ear: External ear normal.      Left Ear: External ear normal.   Eyes:      General: No scleral icterus. Right eye: No discharge. Left eye: No discharge. Conjunctiva/sclera: Conjunctivae normal.      Pupils: Pupils are equal, round, and reactive to light. Neck:      Thyroid: No thyromegaly. Vascular: No JVD. Trachea: No tracheal deviation. Cardiovascular:      Rate and Rhythm: Normal rate. Rhythm irregular. Heart sounds: Murmur heard. No friction rub. Gallop present. Pulmonary:      Effort: Pulmonary effort is normal. No respiratory distress. Breath sounds: Normal breath sounds. No stridor. No wheezing or rales. Chest:      Chest wall: No tenderness. Abdominal:      General: Bowel sounds are normal. There is no distension. Palpations: Abdomen is soft. There is no mass. Tenderness: There is no abdominal tenderness. There is no guarding or rebound. Musculoskeletal:         General: Swelling present. No tenderness or deformity. Normal range of motion. Cervical back: Normal range of motion and neck supple. Skin:     General: Skin is warm and dry. Coloration: Skin is not pale. Findings: Bruising and lesion present. No erythema or rash. Neurological:      Mental Status: She is alert and oriented to person, place, and time. Cranial Nerves: No cranial nerve deficit. Motor: No abnormal muscle tone. Coordination: Coordination normal.      Deep Tendon Reflexes: Reflexes are normal and symmetric. Psychiatric:         Behavior: Behavior normal.         Thought Content:  Thought content normal.         Judgment: Judgment normal.         Labs:     Lab Results   Component Value Date    WBC 6.46 10/04/2023    HGB 13.2 10/04/2023    HCT 40.1 10/04/2023    MCV 86 10/04/2023     10/04/2023     Lab Results   Component Value Date    K 3.9 10/04/2023    CL 99 10/04/2023    CO2 28 10/04/2023    BUN 41 (H) 10/04/2023    CREATININE 1.44 (H) 10/04/2023    GLUCOSE 124 2020    GLUF 91 2023    CALCIUM 9.9 10/04/2023    CORRECTEDCA 10.1 2023    AST 16 10/04/2023    ALT 12 10/04/2023    ALKPHOS 95 10/04/2023    EGFR 33 10/04/2023     Lab Results   Component Value Date    CHOL 191 2013    CHOL 218 2013     Lab Results   Component Value Date    HDL 59 2023    HDL 54 2022    HDL 46 (L) 2022     Lab Results   Component Value Date    LDLCALC 53 2023    LDLCALC 48 2022    LDLCALC 45 2022     Lab Results   Component Value Date    TRIG 80 2023    TRIG 96 2022    TRIG 92 2022     Lab Results   Component Value Date    HGBA1C 5.6 2022       Imaging & Testing   I have personally reviewed pertinent reports. EKG: Personally reviewed. Normal sinus rhythm no acute st/t wave    Cardiac testing:   Results for orders placed during the hospital encounter of 01/15/16   Echo complete with contrast if indicated    [address]  [phone]    Transthoracic Echocardiogram  2D, M-mode, Doppler, and Color Doppler    Study date:  15-Berny-2016    Patient: Marycruz Watts  MR number: [id number]  Account number: [de-identified]  : 1940  Age: 67 years  Gender: Female  Status: Routine  Location: Echo lab  Height: 61 in  Weight: 214.5 lb  BP: 132/ 84 mmHg    Indications: HTN    Diagnoses: 401.9 - HYPERTENSION NOS    Sonographer:  Edgardo Oneill  Primary Physician:  Kip Aguirre  Referring Physician:  Tammy Steve:  Rosalva Joy  Interpreting Physician:  DO AFIA Zacarias    LEFT VENTRICLE:  Systolic function was at the lower limits of normal. Ejection fraction was  estimated in the range of 50 % to 55 %.   There were no regional wall motion abnormalities. There was moderate concentric hypertrophy. Features were consistent with a pseudonormal left ventricular filling pattern,  with concomitant abnormal relaxation and increased filling pressure (grade 2  diastolic dysfunction). Doppler parameters were consistent with elevated mean  left atrial filling pressure. LEFT ATRIUM:  The atrium was moderately dilated. RIGHT ATRIUM:  The atrium was markedly dilated. MITRAL VALVE:  There was marked annular calcification. There was moderate regurgitation. TRICUSPID VALVE:  There was mild regurgitation. Pulmonary artery systolic pressure was mildly increased. Estimated peak PA pressure was 46 mmHg. HISTORY: PRIOR HISTORY: Patient has no history of cardiovascular disease. PROCEDURE: The procedure was performed in the echo lab. This was a routine  study. The transthoracic approach was used. The study included complete 2D  imaging, M-mode, complete spectral Doppler, and color Doppler. The heart rate  was 77 bpm, at the start of the study. Echocardiographic views were limited due  to poor acoustic window availability and decreased penetration. This was a  technically difficult study. LEFT VENTRICLE: Size was normal. Systolic function was at the lower limits of  normal. Ejection fraction was estimated in the range of 50 % to 55 %. There  were no regional wall motion abnormalities. There was moderate concentric  hypertrophy. DOPPLER: Features were consistent with a pseudonormal left  ventricular filling pattern, with concomitant abnormal relaxation and increased  filling pressure (grade 2 diastolic dysfunction). Doppler parameters were  consistent with elevated mean left atrial filling pressure. RIGHT VENTRICLE: The size was normal. Systolic function was normal. DOPPLER:  Systolic pressure was within the normal range. LEFT ATRIUM: The atrium was moderately dilated. No thrombus was identified.     RIGHT ATRIUM: The atrium was markedly dilated. MITRAL VALVE: There was marked annular calcification. Valve structure was  normal. There was normal leaflet separation. No echocardiographic evidence for  prolapse. DOPPLER: The transmitral velocity was within the normal range. There  was no evidence for stenosis. There was moderate regurgitation. AORTIC VALVE: The valve was trileaflet. Leaflets exhibited normal thickness,  normal cuspal separation, and sclerosis. DOPPLER: Transaortic velocity was  within the normal range. There was no evidence for stenosis. There was no  regurgitation. TRICUSPID VALVE: The valve structure was normal. There was normal leaflet  separation. DOPPLER: The transtricuspid velocity was within the normal range. There was mild regurgitation. Pulmonary artery systolic pressure was mildly  increased. Estimated peak PA pressure was 46 mmHg. PULMONIC VALVE: Leaflets exhibited normal thickness, no calcification, and  normal cuspal separation. DOPPLER: The transpulmonic velocity was within the  normal range. There was mild regurgitation. PERICARDIUM: There was no thickening. There was no pericardial effusion. AORTA: The root exhibited normal size. PULMONARY ARTERY: The size was normal. The morphology appeared normal.    SYSTEM MEASUREMENT TABLES    2D mode  AoR Diam 2D: 2.8 cm  LA Diam (2D): 5.3 cm  LA/Ao (2D): 1.89  FS (2D Teich): 25.3 %  IVSd (2D): 1.44 cm  LVDEV: 98.3 cm³  LVESV: 49.1 cm³  LVIDd(2D): 4.62 cm  LVISd (2D): 3.45 cm  LVPWd (2D): 1.3 cm  SV (Teich): 49.2 cm³    Apical four chamber  LVEF A4C: 55 %    Unspecified Scan Mode  MV Peak A Jesse: 1110 mm/s  MV Peak E Jesse.  Mean: 1400 mm/s  MVA (PHT): 3.55 cm squared  PHT: 58 ms  Max P mm[Hg]  V Max: 2990 mm/s  Vmax: 3050 mm/s  RA Area: 19.6 cm squared  RA Volume: 55.3 cm³  TAPSE: 1.9 cm    [employer] Accredited Echocardiography Laboratory    Prepared and electronically signed by    Janice Ness DO  Signed 2016 17:37:47       EKG atrial fibrillation 100 beats per minute  afib 81bmp rbbb nonspecific t-wave changes        Ousmane Lennon  [facility]  Please call with any questions or suggestions    A description of the counselin minutes spent with family discussing about risk for long-term anticoagulation versus benefits  Goals and Barriers:  Patient's ability to self care:  Medication side effect reviewed with patient in detail and all their questions answered. "This note has been constructed using a voice recognition system. Therefore there may be syntax, spelling, and/or grammatical errors.  Please call if you have any questions. "

## 2023-11-08 ENCOUNTER — IMMUNIZATIONS (OUTPATIENT)
Dept: FAMILY MEDICINE CLINIC | Facility: CLINIC | Age: 83
End: 2023-11-08
Payer: MEDICARE

## 2023-11-08 DIAGNOSIS — Z23 ENCOUNTER FOR IMMUNIZATION: Primary | ICD-10-CM

## 2023-11-08 PROCEDURE — G0008 ADMIN INFLUENZA VIRUS VAC: HCPCS

## 2023-11-08 PROCEDURE — 90662 IIV NO PRSV INCREASED AG IM: CPT

## 2023-11-12 DIAGNOSIS — I49.1 PAC (PREMATURE ATRIAL CONTRACTION): ICD-10-CM

## 2023-11-12 DIAGNOSIS — Z79.01 CURRENT USE OF LONG TERM ANTICOAGULATION: ICD-10-CM

## 2023-11-12 DIAGNOSIS — I34.0 MODERATE TO SEVERE MITRAL REGURGITATION: ICD-10-CM

## 2023-11-12 DIAGNOSIS — I48.0 PAROXYSMAL ATRIAL FIBRILLATION (HCC): ICD-10-CM

## 2023-11-29 NOTE — TELEPHONE ENCOUNTER
Goal Outcome Evaluation:  Plan of Care Reviewed With: patient        Progress: improving  Outcome Evaluation: vss. telemetry monitor, sr. alert and oriented x4, room air. up ad [place]. to be discharged          Pt aware of the above,mailed lab slip

## 2023-11-30 DIAGNOSIS — I50.33 ACUTE ON CHRONIC DIASTOLIC CONGESTIVE HEART FAILURE (HCC): ICD-10-CM

## 2023-11-30 DIAGNOSIS — I10 BENIGN ESSENTIAL HTN: ICD-10-CM

## 2023-11-30 RX ORDER — BUMETANIDE 2 MG/1
2 TABLET ORAL DAILY
Qty: 90 TABLET | Refills: 3 | Status: SHIPPED | OUTPATIENT
Start: 2023-11-30

## 2023-11-30 RX ORDER — BUMETANIDE 2 MG/1
2 TABLET ORAL DAILY
Qty: 90 TABLET | Refills: 0 | Status: CANCELLED | OUTPATIENT
Start: 2023-11-30

## 2023-11-30 RX ORDER — SPIRONOLACTONE 25 MG/1
12.5 TABLET ORAL DAILY
Qty: 45 TABLET | Refills: 3 | Status: SHIPPED | OUTPATIENT
Start: 2023-11-30

## 2023-12-02 DIAGNOSIS — I48.0 PAROXYSMAL ATRIAL FIBRILLATION (HCC): ICD-10-CM

## 2023-12-02 DIAGNOSIS — E03.9 HYPOTHYROIDISM, UNSPECIFIED TYPE: Chronic | ICD-10-CM

## 2023-12-02 DIAGNOSIS — I49.1 PAC (PREMATURE ATRIAL CONTRACTION): ICD-10-CM

## 2023-12-04 RX ORDER — LEVOTHYROXINE SODIUM 88 UG/1
88 TABLET ORAL DAILY
Qty: 90 TABLET | Refills: 1 | Status: SHIPPED | OUTPATIENT
Start: 2023-12-04

## 2023-12-04 RX ORDER — PANTOPRAZOLE SODIUM 20 MG/1
20 TABLET, DELAYED RELEASE ORAL DAILY
Qty: 90 TABLET | Refills: 1 | Status: SHIPPED | OUTPATIENT
Start: 2023-12-04

## 2023-12-07 ENCOUNTER — APPOINTMENT (OUTPATIENT)
Dept: LAB | Facility: CLINIC | Age: 83
End: 2023-12-07
Payer: MEDICARE

## 2023-12-07 DIAGNOSIS — E03.9 HYPOTHYROIDISM, UNSPECIFIED TYPE: Chronic | ICD-10-CM

## 2023-12-07 DIAGNOSIS — E78.5 HYPERLIPIDEMIA, UNSPECIFIED HYPERLIPIDEMIA TYPE: ICD-10-CM

## 2023-12-07 DIAGNOSIS — N18.30 STAGE 3 CHRONIC KIDNEY DISEASE, UNSPECIFIED WHETHER STAGE 3A OR 3B CKD (HCC): Chronic | ICD-10-CM

## 2023-12-07 DIAGNOSIS — R73.03 PREDIABETES: ICD-10-CM

## 2023-12-07 LAB
ALBUMIN SERPL BCP-MCNC: 4.1 G/DL (ref 3.5–5)
ALP SERPL-CCNC: 82 U/L (ref 34–104)
ALT SERPL W P-5'-P-CCNC: 11 U/L (ref 7–52)
ANION GAP SERPL CALCULATED.3IONS-SCNC: 11 MMOL/L
AST SERPL W P-5'-P-CCNC: 18 U/L (ref 13–39)
BACTERIA UR QL AUTO: ABNORMAL /HPF
BILIRUB SERPL-MCNC: 0.47 MG/DL (ref 0.2–1)
BILIRUB UR QL STRIP: NEGATIVE
BUN SERPL-MCNC: 39 MG/DL (ref 5–25)
CALCIUM SERPL-MCNC: 9.5 MG/DL (ref 8.4–10.2)
CHLORIDE SERPL-SCNC: 105 MMOL/L (ref 96–108)
CHOLEST SERPL-MCNC: 105 MG/DL
CLARITY UR: CLEAR
CO2 SERPL-SCNC: 27 MMOL/L (ref 21–32)
COLOR UR: ABNORMAL
CREAT SERPL-MCNC: 1.46 MG/DL (ref 0.6–1.3)
CREAT UR-MCNC: 115.7 MG/DL
ERYTHROCYTE [DISTWIDTH] IN BLOOD BY AUTOMATED COUNT: 15 % (ref 11.6–15.1)
EST. AVERAGE GLUCOSE BLD GHB EST-MCNC: 126 MG/DL
GFR SERPL CREATININE-BSD FRML MDRD: 32 ML/MIN/1.73SQ M
GLUCOSE P FAST SERPL-MCNC: 85 MG/DL (ref 65–99)
GLUCOSE UR STRIP-MCNC: NEGATIVE MG/DL
HBA1C MFR BLD: 6 %
HCT VFR BLD AUTO: 38.7 % (ref 34.8–46.1)
HDLC SERPL-MCNC: 50 MG/DL
HGB BLD-MCNC: 12.2 G/DL (ref 11.5–15.4)
HGB UR QL STRIP.AUTO: NEGATIVE
KETONES UR STRIP-MCNC: NEGATIVE MG/DL
LDLC SERPL CALC-MCNC: 37 MG/DL (ref 0–100)
LEUKOCYTE ESTERASE UR QL STRIP: NEGATIVE
MAGNESIUM SERPL-MCNC: 1.8 MG/DL (ref 1.9–2.7)
MCH RBC QN AUTO: 29.1 PG (ref 26.8–34.3)
MCHC RBC AUTO-ENTMCNC: 31.5 G/DL (ref 31.4–37.4)
MCV RBC AUTO: 92 FL (ref 82–98)
NITRITE UR QL STRIP: NEGATIVE
NON-SQ EPI CELLS URNS QL MICRO: ABNORMAL /HPF
NONHDLC SERPL-MCNC: 55 MG/DL
PH UR STRIP.AUTO: 5.5 [PH]
PHOSPHATE SERPL-MCNC: 3.7 MG/DL (ref 2.3–4.1)
PLATELET # BLD AUTO: 169 THOUSANDS/UL (ref 149–390)
PMV BLD AUTO: 11.6 FL (ref 8.9–12.7)
POTASSIUM SERPL-SCNC: 4.1 MMOL/L (ref 3.5–5.3)
PROT SERPL-MCNC: 6.6 G/DL (ref 6.4–8.4)
PROT UR STRIP-MCNC: ABNORMAL MG/DL
PROT UR-MCNC: 8 MG/DL
PROT/CREAT UR: 0.07 MG/G{CREAT} (ref 0–0.1)
PTH-INTACT SERPL-MCNC: 109.4 PG/ML (ref 12–88)
RBC # BLD AUTO: 4.19 MILLION/UL (ref 3.81–5.12)
RBC #/AREA URNS AUTO: ABNORMAL /HPF
SODIUM SERPL-SCNC: 143 MMOL/L (ref 135–147)
SP GR UR STRIP.AUTO: 1.02 (ref 1–1.03)
TRIGL SERPL-MCNC: 89 MG/DL
TSH SERPL DL<=0.05 MIU/L-ACNC: 1.74 UIU/ML (ref 0.45–4.5)
UROBILINOGEN UR STRIP-ACNC: <2 MG/DL
WBC # BLD AUTO: 6.06 THOUSAND/UL (ref 4.31–10.16)
WBC #/AREA URNS AUTO: ABNORMAL /HPF

## 2023-12-07 PROCEDURE — 36415 COLL VENOUS BLD VENIPUNCTURE: CPT

## 2023-12-07 PROCEDURE — 84156 ASSAY OF PROTEIN URINE: CPT

## 2023-12-07 PROCEDURE — 85027 COMPLETE CBC AUTOMATED: CPT

## 2023-12-07 PROCEDURE — 81001 URINALYSIS AUTO W/SCOPE: CPT

## 2023-12-07 PROCEDURE — 80053 COMPREHEN METABOLIC PANEL: CPT

## 2023-12-07 PROCEDURE — 83970 ASSAY OF PARATHORMONE: CPT

## 2023-12-07 PROCEDURE — 84443 ASSAY THYROID STIM HORMONE: CPT

## 2023-12-07 PROCEDURE — 80061 LIPID PANEL: CPT

## 2023-12-07 PROCEDURE — 84100 ASSAY OF PHOSPHORUS: CPT

## 2023-12-07 PROCEDURE — 82570 ASSAY OF URINE CREATININE: CPT

## 2023-12-07 PROCEDURE — 83036 HEMOGLOBIN GLYCOSYLATED A1C: CPT

## 2023-12-07 PROCEDURE — 83735 ASSAY OF MAGNESIUM: CPT

## 2023-12-13 ENCOUNTER — OFFICE VISIT (OUTPATIENT)
Dept: FAMILY MEDICINE CLINIC | Facility: CLINIC | Age: 83
End: 2023-12-13
Payer: MEDICARE

## 2023-12-13 VITALS
HEART RATE: 80 BPM | BODY MASS INDEX: 29.64 KG/M2 | HEIGHT: 61 IN | SYSTOLIC BLOOD PRESSURE: 116 MMHG | OXYGEN SATURATION: 99 % | WEIGHT: 157 LBS | DIASTOLIC BLOOD PRESSURE: 70 MMHG

## 2023-12-13 DIAGNOSIS — N18.30 STAGE 3 CHRONIC KIDNEY DISEASE, UNSPECIFIED WHETHER STAGE 3A OR 3B CKD (HCC): Chronic | ICD-10-CM

## 2023-12-13 DIAGNOSIS — E03.9 HYPOTHYROIDISM, UNSPECIFIED TYPE: ICD-10-CM

## 2023-12-13 DIAGNOSIS — E78.5 HYPERLIPIDEMIA, UNSPECIFIED HYPERLIPIDEMIA TYPE: Primary | ICD-10-CM

## 2023-12-13 DIAGNOSIS — R73.03 PREDIABETES: ICD-10-CM

## 2023-12-13 PROCEDURE — 99214 OFFICE O/P EST MOD 30 MIN: CPT | Performed by: FAMILY MEDICINE

## 2023-12-13 RX ORDER — ATORVASTATIN CALCIUM 40 MG/1
40 TABLET, FILM COATED ORAL EVERY EVENING
Qty: 90 TABLET | Refills: 1 | Status: SHIPPED | OUTPATIENT
Start: 2023-12-13

## 2023-12-13 NOTE — ASSESSMENT & PLAN NOTE
Lab Results   Component Value Date    EGFR 32 12/07/2023    EGFR 33 10/04/2023    EGFR 47 06/29/2023    CREATININE 1.46 (H) 12/07/2023    CREATININE 1.44 (H) 10/04/2023    CREATININE 1.08 06/29/2023   Stable, continue spironolactone / monitoring per renal.

## 2023-12-13 NOTE — PROGRESS NOTES
Subjective:      Patient ID: Jeana Umana is a 79 y.o. female. HPI    Patient is here for routine 6-month follow-up. Has history of hypertension, dyslipidemia, prediabetes, CKD, CHF, A-fib. Denies any symptoms of chest pain or shortness of breath. Lives with her family. Metabolic labs reviewed with patient today. Her fasting blood sugar, cholesterol is at goal, A1c 6.0. Denies any chest pain/ sob. Tolerating all medications well. Past Medical History:   Diagnosis Date    Anxiety     Arthritis     joints    Bunion     Cataract     Disease of thyroid gland     Eczema     GERD (gastroesophageal reflux disease)     Gout     Heart failure ([address])     Hepatitis     Hiatal hernia     Hypertension     Onychomycosis     last assessed: 16    Orthopnea     resolved: 16    Pseudogout of left wrist     Sleep apnea     wears CPAP    Stroke ([address])        Family History   Problem Relation Age of Onset    Diabetes Mother     Coronary artery disease Mother     Arthritis Mother     Hypertension Mother     Hypertension Father     Diabetes Brother     Diabetes Son     Arthritis Family        Past Surgical History:   Procedure Laterality Date    ABDOMINAL SURGERY          BRAIN HEMATOMA EVACUATION Right 2020    Procedure: Right decompressive craniectomy and evacuation of intraparenchymal hematoma; Surgeon: Rabia Osorio MD;  Location: [facility];  Service: Neurosurgery    BREAST SURGERY Right     cyst removal    CARPAL TUNNEL RELEASE Left     CARPAL TUNNEL RELEASE Right     CATARACT EXTRACTION       SECTION  1960    x1    COLONOSCOPY N/A 2018    Procedure: COLONOSCOPY;  Surgeon: Otoniel Galeano MD;  Location: [address] [place] [address] GI LAB;   Service: Gastroenterology    DILATION AND CURETTAGE OF UTERUS  1967    EYE SURGERY Left 2006    cataracts    HERNIA REPAIR      umbilical hernia    INCISIONAL HERNIA REPAIR      incarcerated    KNEE ARTHROSCOPY Right     MI RPLCMT BONE FLAP/PROSTHETIC PLATE SKULL Right 4/6/2020    Procedure: right frontotemporal replacement cranioplasty;  Surgeon: Radha Milan MD;  Location:  MAIN OR;  Service: Neurosurgery    NC XCAPSL CTRC RMVL INSJ IO LENS PROSTH W/O ECP Right 3/27/2017    Procedure: EXTRACTION EXTRACAPSULAR CATARACT PHACO INTRAOCULAR LENS (IOL); Surgeon: Chad Llamas MD;  Location: [facility] MAIN OR;  Service: Ophthalmology        reports that she has never smoked. She has never used smokeless tobacco. She reports current alcohol use. She reports that she does not use drugs. Current Outpatient Medications:     amLODIPine (NORVASC) 10 mg tablet, Take 0.5 tablets (5 mg total) by mouth daily, Disp: 90 tablet, Rfl: 0    apixaban (Eliquis) 2.5 mg, Take 1 tablet (2.5 mg total) by mouth 2 (two) times a day, Disp: 180 tablet, Rfl: 3    atorvastatin (LIPITOR) 40 mg tablet, Take 1 tablet (40 mg total) by mouth every evening, Disp: 90 tablet, Rfl: 1    bumetanide (BUMEX) 2 mg tablet, Take 1 tablet (2 mg total) by mouth daily, Disp: 90 tablet, Rfl: 3    Diclofenac Sodium (VOLTAREN) 1 %, Apply 2 g topically in the morning APPLY NO MORE THAN 3 DAYS IN A ROW THEN TAKE A BREAK FOR ONE WEEK FROM USE., Disp: 100 g, Rfl: 1    gabapentin (NEURONTIN) 100 mg capsule, Take 1 tablet at bedtime.   May increase to 2 tablets after 3 days, Disp: 90 capsule, Rfl: 1    levothyroxine 88 mcg tablet, TAKE 1 TABLET EVERY DAY, Disp: 90 tablet, Rfl: 1    losartan (COZAAR) 25 mg tablet, TAKE 2 TABLETS IN THE MORNING AND TAKE 1 TABLET IN THE EVENING, Disp: 270 tablet, Rfl: 10    metoprolol tartrate (LOPRESSOR) 25 mg tablet, Take 0.5 tablets (12.5 mg total) by mouth every 12 (twelve) hours, Disp: 45 tablet, Rfl: 3    pantoprazole (PROTONIX) 20 mg tablet, TAKE 1 TABLET EVERY DAY, Disp: 90 tablet, Rfl: 1    senna-docusate sodium (SENOKOT S) 8.6-50 mg per tablet, Take 1 tablet by mouth daily, Disp: 20 tablet, Rfl: 1    spironolactone (ALDACTONE) 25 mg tablet, Take 0.5 tablets (12.5 mg total) by mouth daily, Disp: 45 tablet, Rfl: 3    ketoconazole (NIZORAL) 2 % cream, Apply topically daily, Disp: 60 g, Rfl: 1    The following portions of the patient's history were reviewed and updated as appropriate: allergies, current medications, past family history, past medical history, past social history, past surgical history and problem list.    Review of Systems   Constitutional: Negative. Respiratory: Negative. Cardiovascular: Negative. Objective:    /70   Pulse 80   Ht 5' 1" (1.549 m)   Wt 71.2 kg (157 lb)   SpO2 99%   BMI 29.66 kg/m²      Physical Exam  Constitutional:       Appearance: Normal appearance. Cardiovascular:      Heart sounds: Normal heart sounds. Pulmonary:      Breath sounds: Normal breath sounds. Neurological:      Mental Status: She is oriented to person, place, and time. Psychiatric:         Mood and Affect: Mood normal.           Recent Results (from the past 1008 hour(s))   Comprehensive metabolic panel    Collection Time: 12/07/23  7:13 AM   Result Value Ref Range    Sodium 143 135 - 147 mmol/L    Potassium 4.1 3.5 - 5.3 mmol/L    Chloride 105 96 - 108 mmol/L    CO2 27 21 - 32 mmol/L    ANION GAP 11 mmol/L    BUN 39 (H) 5 - 25 mg/dL    Creatinine 1.46 (H) 0.60 - 1.30 mg/dL    Glucose, Fasting 85 65 - 99 mg/dL    Calcium 9.5 8.4 - 10.2 mg/dL    AST 18 13 - 39 U/L    ALT 11 7 - 52 U/L    Alkaline Phosphatase 82 34 - 104 U/L    Total Protein 6.6 6.4 - 8.4 g/dL    Albumin 4.1 3.5 - 5.0 g/dL    Total Bilirubin 0.47 0.20 - 1.00 mg/dL    eGFR 32 ml/min/1.73sq m   Hemoglobin A1C    Collection Time: 12/07/23  7:13 AM   Result Value Ref Range    Hemoglobin A1C 6.0 (H) Normal 4.0-5.6%; PreDiabetic 5.7-6.4%;  Diabetic >=6.5%; Glycemic control for adults with diabetes <7.0% %     mg/dl   Lipid panel    Collection Time: 12/07/23  7:13 AM   Result Value Ref Range    Cholesterol 105 See Comment mg/dL    Triglycerides 89 See Comment mg/dL    HDL, Direct 50 >=50 mg/dL    LDL Calculated 37 0 - 100 mg/dL    Non-HDL-Chol (CHOL-HDL) 55 mg/dl   TSH, 3rd generation with Free T4 reflex    Collection Time: 12/07/23  7:13 AM   Result Value Ref Range    TSH 3RD GENERATON 1.741 0.450 - 4.500 uIU/mL   CBC    Collection Time: 12/07/23  7:13 AM   Result Value Ref Range    WBC 6.06 4.31 - 10.16 Thousand/uL    RBC 4.19 3.81 - 5.12 Million/uL    Hemoglobin 12.2 11.5 - 15.4 g/dL    Hematocrit 38.7 34.8 - 46.1 %    MCV 92 82 - 98 fL    MCH 29.1 26.8 - 34.3 pg    MCHC 31.5 31.4 - 37.4 g/dL    RDW 15.0 11.6 - 15.1 %    Platelets 042 737 - 367 Thousands/uL    MPV 11.6 8.9 - 12.7 fL   Magnesium    Collection Time: 12/07/23  7:13 AM   Result Value Ref Range    Magnesium 1.8 (L) 1.9 - 2.7 mg/dL   Phosphorus    Collection Time: 12/07/23  7:13 AM   Result Value Ref Range    Phosphorus 3.7 2.3 - 4.1 mg/dL   Protein / creatinine ratio, urine    Collection Time: 12/07/23  7:13 AM   Result Value Ref Range    Creatinine, Ur 115.7 Reference range not established. mg/dL    Protein Urine Random 8 Reference range not established. mg/dL    Prot/Creat Ratio, Ur 0.07 0.00 - 0.10   PTH, intact    Collection Time: 12/07/23  7:13 AM   Result Value Ref Range    .4 (H) 12.0 - 88.0 pg/mL   Urinalysis with microscopic    Collection Time: 12/07/23  7:13 AM   Result Value Ref Range    Color, UA Light Yellow     Clarity, UA Clear     Specific Gravity, UA 1.020 1.003 - 1.030    pH, UA 5.5 4.5, 5.0, 5.5, 6.0, 6.5, 7.0, 7.5, 8.0    Leukocytes, UA Negative Negative    Nitrite, UA Negative Negative    Protein, UA Trace (A) Negative mg/dl    Glucose, UA Negative Negative mg/dl    Ketones, UA Negative Negative mg/dl    Urobilinogen, UA <2.0 <2.0 mg/dl mg/dl    Bilirubin, UA Negative Negative    Occult Blood, UA Negative Negative    RBC, UA 1-2 None Seen, 1-2 /hpf    WBC, UA 1-2 None Seen, 1-2 /hpf    Epithelial Cells Occasional None Seen, Occasional /hpf    Bacteria, UA None Seen None Seen, Occasional /hpf Assessment/Plan:    No problem-specific Assessment & Plan notes found for this encounter. Problem List Items Addressed This Visit          Endocrine    Hypothyroidism (Chronic)     TSH stable. Continue levothyroxine 88 mcg          Relevant Orders    TSH, 3rd generation with Free T4 reflex       Genitourinary    CKD (chronic kidney disease), stage III (HCC) (Chronic)     Lab Results   Component Value Date    EGFR 32 12/07/2023    EGFR 33 10/04/2023    EGFR 47 06/29/2023    CREATININE 1.46 (H) 12/07/2023    CREATININE 1.44 (H) 10/04/2023    CREATININE 1.08 06/29/2023   Stable, continue spironolactone / monitoring per renal.             Other    Hyperlipemia - Primary     LDL is at goal.  Continue atorvastatin 40 mg. Metabolic labs/follow-up 6 month interval         Relevant Medications    atorvastatin (LIPITOR) 40 mg tablet    Other Relevant Orders    Lipid panel    Prediabetes     Reviewed with patient today. A1c stable at 6.0  Encouraged to continue with low carb diet. Continue monitoring.          Relevant Orders    Comprehensive metabolic panel    Hemoglobin A1C

## 2023-12-13 NOTE — ASSESSMENT & PLAN NOTE
Reviewed with patient today. A1c stable at 6.0  Encouraged to continue with low carb diet. Continue monitoring.

## 2023-12-15 ENCOUNTER — OFFICE VISIT (OUTPATIENT)
Dept: NEPHROLOGY | Facility: CLINIC | Age: 83
End: 2023-12-15
Payer: MEDICARE

## 2023-12-15 VITALS
SYSTOLIC BLOOD PRESSURE: 128 MMHG | OXYGEN SATURATION: 100 % | HEART RATE: 72 BPM | WEIGHT: 155.6 LBS | DIASTOLIC BLOOD PRESSURE: 72 MMHG | BODY MASS INDEX: 29.38 KG/M2 | HEIGHT: 61 IN

## 2023-12-15 DIAGNOSIS — I10 BENIGN ESSENTIAL HTN: Primary | ICD-10-CM

## 2023-12-15 DIAGNOSIS — I50.33 ACUTE ON CHRONIC DIASTOLIC CONGESTIVE HEART FAILURE (HCC): ICD-10-CM

## 2023-12-15 DIAGNOSIS — N18.30 STAGE 3 CHRONIC KIDNEY DISEASE, UNSPECIFIED WHETHER STAGE 3A OR 3B CKD (HCC): ICD-10-CM

## 2023-12-15 PROCEDURE — 99214 OFFICE O/P EST MOD 30 MIN: CPT | Performed by: INTERNAL MEDICINE

## 2023-12-15 RX ORDER — BUMETANIDE 2 MG/1
1 TABLET ORAL DAILY
Start: 2023-12-15

## 2023-12-15 NOTE — LETTER
December 15, 2023     Mindy Mcallister MD  [address] [place] [id number]    Patient: Sabas Pemberton   YOB: 1940   Date of Visit: 12/15/2023       Dear Dr. Becca Rodriguez:    Thank you for referring Noe Blunt to me for evaluation. Below are my notes for this consultation. If you have questions, please do not hesitate to call me. I look forward to following your patient along with you. Sincerely,        Al Bro MD        CC: No Recipients    Al Bro MD  12/15/2023 11:44 AM  Sign when Signing Visit  NEPHROLOGY OFFICE VISIT   Sabas Pemberton 79 y.o. female MRN: [id number]  12/15/2023    Reason for Visit: CKD stage III    ASSESSMENT and PLAN:    I had the pleasure of seeing Ms Juany Ken today in the renal clinic for the continued management of CKD III.     2/2020 - February with 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 on February 11th. Completed course of Keppra for seizure prophylaxis. 3/30/2020 - losartan was increased to 50 mg twice a day from 50 in the morning and 25 in the evening due to high blood pressures     4/3/2020 - spironolactone 12.5 mg daily was started due to continued high blood pressure     4/2020 - patient had presented for replacement current of cranioplasty on April 6. And subsequently was admitted to the rehab center postoperatively      March 2021- patient was admitted to hospital with not feeling well and   Headache. CT scan of the head with no acute intracranial pathology. The other symptoms of memory loss were also present. Patient was referred to geriatrician. April 2021 - sciatic pain. Received brief steroid course     5/2021 - back pain. Went to ER. Given pred taper     5/2023 - neck pain. CT of spine -  no cervical spine fracture or traumatic malalignmen     October 2023-patient was in the ER for dizziness and headache. Patient was given normal saline. Had a CAT scan completed.   CT of the head no acute intracranial abnormalities with unchanged large chronic encephalomalacia and gliosis in the right temporal lobe in the region of remote hemorrhage. Chest x-ray was unrevealing. Patient was discharged after volume expansion and feeling improved. October 27, 2023-patient to follow-up with cardiology team.  For now mitral valve is being monitored. CT abdpel - no acute findings. Potential gastritis. 65-year-old female with a past medical history of chronic kidney disease, venous stasis, HTN, hypothyroidism, lumbar canal stenosis, Anxiety, GERD, WENDI, neuropathy, Gout, EF 68-23%, Grade 2 diastolic dysfunction who presents for follow up for CKD. To note, patient's  has now passed away in August.     1) CKD III - patient also has a long standing history of HTN. Creatinine in 2013, 0.9 mg/dL. Cr early 2016 was 0.80-0.9 mg/dL; then increased starting in august 2016 to 2.3 mg/dL and has fluctuated since. CT scan in 2016, kidneys appearing normal at that time. Etiology of CKD may be long standing HTN and ATN. Baseline Cr ~ 1.1 -1.3 mg/dL     - Urine eos 0%;   - A1c controlled prior  at 5.6% 12/2022  - UPCR too low to quant (6/2023)  - UA bland 6/2023  -SPEP unrevealing  -UPEP unrevealing  - C3, C4 unrevealing  - Renal u/s with R 10.3 cm, L 10.4 cm, renal cortex 1 cm b/l; both kidneys slightly reduced in size; lower pole of R kidney is non obstructing calculus  - 10/2023 - CT without hydro on kidney  - Pt follows with Urology team for nephrolithiasis. - No NSAIDs, the patient is not currently taking NSAID  -nuc stress test per Cardiology in Jan 2019 unrevealing  - repeat renal u/s 1/2019 - unrevealing with exception of renal cortical atrophy; but also 6 mm non shadowing calculus. Overall, patient is seen for appointment December 2023. Blood pressure stable 555/14 systolic. And appropriate. Lab work from December 7 reviewed. Creatinine slightly above baseline of 1.46 mg/dL. Electrolytes are stable and appropriate. Hemoglobin at goal.   and appropriate for now. Urinalysis is bland. UPCR is 0.07. Patient has been having intermittent episodes of dizziness. She brought in 1 blood pressure reading when she had this issue and the blood pressures were low again at 156 systolic. She has lost weight and has not been eating well overall per her daughter who is present. Patient is euvolemic. I will lower Bumex today slightly with parameters for the patient to call and increase Bumex if needed. I am concerned that she may be becoming volume depleted as the cause of her dizziness. She does have a history of mitral regurg which is being followed closely with cardiology team.     Plan:     -Lower Bumex to 1 mg once a day from 2 mg once a day  - Parameters given to the patient to increase Bumex if needed  - Continue amlodipine 5 mg daily, spironolactone 12.5 mg daily, metoprolol 25 mg twice daily, losartan 50 in the morning and 20 5 in the evening  - Lab work in 2 months  - Appointment in 2 months for close follow-up for volume check  - Overall today patient does not have any dizziness or lightheadedness and blood pressures are appropriate. But she has lost weight, is euvolemic and will lower Bumex slightly given the intermittent episodes of dizziness at home and recent ER visit findings. 2) SOB - Volume - follows with Cardiology     - on Bumex. Will lower slightly to 1 mg once a day on December 15, 2023 as the patient may be becoming hypovolemic     3) HTN -      - cont losartan, bumex and amlodipine, spironolactone  -  due to bradycardia, beta-blocker was held prior but given new onset atrial fibrillation around May 2022, patient was restarted on metoprolol per Cardiology team  - December 2023-lower Bumex to 1 mg once daily due to concern for hypovolemia     4) hypothyroid - as per Primary Care Physician     5) HPL - Primary Care following.       6) Electrolytes - stable     7) MBD -     - Vit D 50.7 in nov 2019 --> 40.8 March 2022  - PTH improved 89 in nov 2019 --> 78.6 in June 2020 --> 88 3/8/2022 --> 136 December 2022 -->109 December 2023. Appropriate for now. 8) gout -      -monitor for flare     9) back pain - follows with Pain management     - pain sig improved with inj     10) Colonoscopy in feb with internal hemorrhoids and polyp removed. 11) alk phos elevation     -improved     12) Anemia - Hb stable     -hemoglobin stable and at goal     13) Mitral valve calcification     - moderate to severe mitral regurgitation  -eventually may require further evaluation. Being monitored for now conservatively. - per Cardiology team     14) elevated D-dimer prior-patient was given duplex of lower extremities which was unrevealing prior     15) knee pain after fall in august 2020     - saw Orthopedic team  - steroid injection was given     16) intracranial hemorrhage with mass effect requiring craniotomy on 2/11/2020     - now is status post replacement of cranioplasty in April 2020     17)  Anxiety- patient was started on Cymbalta. PCP is attempting off of gabapentin     - there were periods of not taking cymbalta     18)   Zoster infection in November 2020-treated with Valtrex per primary team     19) a fib - new onset in May 2022     -was advised to have Holter monitor. There is concern for tachy-hola syndrome initially and Holter monitor showed periods of atrial fibrillation with RVR and nonsustained ventricular tachycardia. Was started on low-dose beta-blocker with metoprolol.  -in July metoprolol was titrated further to 25 mg twice a day  -was started on Eliquis 2.5 mg twice a day. Lower dose was chosen due to high risk factors. It was a pleasure evaluating your patient in the office today. Thank you for allowing our team to participate in the care of Ms Primitivo White. Please do not hesitate to contact our team if further issues/questions shall arise in the interim. No problem-specific Assessment & Plan notes found for this encounter. HPI:    Pt denies fevers, chills, nausea, vomiting. No URI. Currently without dizziness or lightheadedness but is having intermittent episodes at home. PATIENT INSTRUCTIONS:    Patient Instructions   1) Avoid NSAIDS - (Example - motrin, advil, ibuprofen, aleve, exederin, etc)  2) Always follow a low salt diet  3) If you have any issues with trouble with nausea, vomiting, urinating, blood in the urine, food tasting like metal, confusion, weakness, fatigue that is worsening, or any other questions, please call right away. 4) Please continue to follow regularly with your family physician and any other specialist that you are advised to see and continue to follow their recommendations  5) check blood pressures twice daily and please send log  6) please cut your bumex in half and take 1/2 tablet daily  - check your weight once daily - if your weight goes up by more than 3 lbs in one day or 5 lbs in one week then increase bumex to one tablet of 2 mg tab daily  7) check your BP twice daily and send log in 2 weeks  8) appt in 2 months for vol check  9) labwork in 2 months  10) no changes to your other medications          OBJECTIVE:  Current Weight: Weight - Scale: 70.6 kg (155 lb 9.6 oz)  Vitals:    12/15/23 1045   BP: 128/72   BP Location: Left arm   Patient Position: Sitting   Cuff Size: Standard   Pulse: 72   SpO2: 100%   Weight: 70.6 kg (155 lb 9.6 oz)   Height: 5' 1" (1.549 m)    Body mass index is 29.4 kg/m². REVIEW OF SYSTEMS:    Review of Systems   Constitutional: Negative. Negative for fatigue. HENT: Negative. Eyes: Negative. Respiratory: Negative. Negative for shortness of breath. Cardiovascular: Negative. Negative for leg swelling. Gastrointestinal: Negative. Endocrine: Negative. Genitourinary: Negative. Negative for difficulty urinating. Musculoskeletal: Negative. Skin: Negative. Allergic/Immunologic: Negative. Neurological: Negative. Hematological: Negative. Psychiatric/Behavioral: Negative. All other systems reviewed and are negative. PHYSICAL EXAM:      Physical Exam  Vitals and nursing note reviewed. Constitutional:       General: She is not in acute distress. Appearance: She is well-developed. She is not diaphoretic. HENT:      Head: Normocephalic and atraumatic. Eyes:      General: No scleral icterus. Right eye: No discharge. Left eye: No discharge. Conjunctiva/sclera: Conjunctivae normal.   Neck:      Vascular: No JVD. Cardiovascular:      Rate and Rhythm: Normal rate and regular rhythm. Heart sounds: Murmur heard. No friction rub. No gallop. Pulmonary:      Effort: Pulmonary effort is normal. No respiratory distress. Breath sounds: Normal breath sounds. No wheezing or rales. Abdominal:      General: Bowel sounds are normal. There is no distension. Palpations: Abdomen is soft. Tenderness: There is no abdominal tenderness. There is no rebound. Musculoskeletal:         General: No tenderness or deformity. Normal range of motion. Cervical back: Normal range of motion and neck supple. Right lower leg: No edema. Left lower leg: No edema. Skin:     General: Skin is warm and dry. Coloration: Skin is not pale. Findings: No erythema or rash. Neurological:      Mental Status: She is alert and oriented to person, place, and time. Coordination: Coordination normal.   Psychiatric:         Behavior: Behavior normal.         Thought Content:  Thought content normal.         Judgment: Judgment normal.         Medications:    Current Outpatient Medications:   •  amLODIPine (NORVASC) 10 mg tablet, Take 0.5 tablets (5 mg total) by mouth daily, Disp: 90 tablet, Rfl: 0  •  apixaban (Eliquis) 2.5 mg, Take 1 tablet (2.5 mg total) by mouth 2 (two) times a day, Disp: 180 tablet, Rfl: 3  • atorvastatin (LIPITOR) 40 mg tablet, Take 1 tablet (40 mg total) by mouth every evening, Disp: 90 tablet, Rfl: 1  •  bumetanide (BUMEX) 2 mg tablet, Take 0.5 tablets (1 mg total) by mouth daily, Disp: , Rfl:   •  Diclofenac Sodium (VOLTAREN) 1 %, Apply 2 g topically in the morning APPLY NO MORE THAN 3 DAYS IN A ROW THEN TAKE A BREAK FOR ONE WEEK FROM USE., Disp: 100 g, Rfl: 1  •  levothyroxine 88 mcg tablet, TAKE 1 TABLET EVERY DAY, Disp: 90 tablet, Rfl: 1  •  losartan (COZAAR) 25 mg tablet, TAKE 2 TABLETS IN THE MORNING AND TAKE 1 TABLET IN THE EVENING, Disp: 270 tablet, Rfl: 10  •  metoprolol tartrate (LOPRESSOR) 25 mg tablet, Take 0.5 tablets (12.5 mg total) by mouth every 12 (twelve) hours, Disp: 45 tablet, Rfl: 3  •  pantoprazole (PROTONIX) 20 mg tablet, TAKE 1 TABLET EVERY DAY, Disp: 90 tablet, Rfl: 1  •  spironolactone (ALDACTONE) 25 mg tablet, Take 0.5 tablets (12.5 mg total) by mouth daily, Disp: 45 tablet, Rfl: 3  •  gabapentin (NEURONTIN) 100 mg capsule, Take 1 tablet at bedtime.   May increase to 2 tablets after 3 days (Patient not taking: Reported on 12/15/2023), Disp: 90 capsule, Rfl: 1  •  ketoconazole (NIZORAL) 2 % cream, Apply topically daily, Disp: 60 g, Rfl: 1  •  senna-docusate sodium (SENOKOT S) 8.6-50 mg per tablet, Take 1 tablet by mouth daily (Patient not taking: Reported on 12/15/2023), Disp: 20 tablet, Rfl: 1    Laboratory Results:        Invalid input(s): "ALBUMIN"    Results for orders placed or performed in visit on 12/07/23   Comprehensive metabolic panel   Result Value Ref Range    Sodium 143 135 - 147 mmol/L    Potassium 4.1 3.5 - 5.3 mmol/L    Chloride 105 96 - 108 mmol/L    CO2 27 21 - 32 mmol/L    ANION GAP 11 mmol/L    BUN 39 (H) 5 - 25 mg/dL    Creatinine 1.46 (H) 0.60 - 1.30 mg/dL    Glucose, Fasting 85 65 - 99 mg/dL    Calcium 9.5 8.4 - 10.2 mg/dL    AST 18 13 - 39 U/L    ALT 11 7 - 52 U/L    Alkaline Phosphatase 82 34 - 104 U/L    Total Protein 6.6 6.4 - 8.4 g/dL    Albumin 4.1 3.5 - 5.0 g/dL    Total Bilirubin 0.47 0.20 - 1.00 mg/dL    eGFR 32 ml/min/1.73sq m   Hemoglobin A1C   Result Value Ref Range    Hemoglobin A1C 6.0 (H) Normal 4.0-5.6%; PreDiabetic 5.7-6.4%;  Diabetic >=6.5%; Glycemic control for adults with diabetes <7.0% %     mg/dl   Lipid panel   Result Value Ref Range    Cholesterol 105 See Comment mg/dL    Triglycerides 89 See Comment mg/dL    HDL, Direct 50 >=50 mg/dL    LDL Calculated 37 0 - 100 mg/dL    Non-HDL-Chol (CHOL-HDL) 55 mg/dl   TSH, 3rd generation with Free T4 reflex   Result Value Ref Range    TSH 3RD GENERATON 1.741 0.450 - 4.500 uIU/mL   CBC   Result Value Ref Range    WBC 6.06 4.31 - 10.16 Thousand/uL    RBC 4.19 3.81 - 5.12 Million/uL    Hemoglobin 12.2 11.5 - 15.4 g/dL    Hematocrit 38.7 34.8 - 46.1 %    MCV 92 82 - 98 fL    MCH 29.1 26.8 - 34.3 pg    MCHC 31.5 31.4 - 37.4 g/dL    RDW 15.0 11.6 - 15.1 %    Platelets 421 009 - 947 Thousands/uL    MPV 11.6 8.9 - 12.7 fL   Magnesium   Result Value Ref Range    Magnesium 1.8 (L) 1.9 - 2.7 mg/dL   Phosphorus   Result Value Ref Range    Phosphorus 3.7 2.3 - 4.1 mg/dL   Protein / creatinine ratio, urine   Result Value Ref Range    Creatinine, Ur 115.7 Reference range not established. mg/dL    Protein Urine Random 8 Reference range not established. mg/dL    Prot/Creat Ratio, Ur 0.07 0.00 - 0.10   PTH, intact   Result Value Ref Range    .4 (H) 12.0 - 88.0 pg/mL   Urinalysis with microscopic   Result Value Ref Range    Color, UA Light Yellow     Clarity, UA Clear     Specific Gravity, UA 1.020 1.003 - 1.030    pH, UA 5.5 4.5, 5.0, 5.5, 6.0, 6.5, 7.0, 7.5, 8.0    Leukocytes, UA Negative Negative    Nitrite, UA Negative Negative    Protein, UA Trace (A) Negative mg/dl    Glucose, UA Negative Negative mg/dl    Ketones, UA Negative Negative mg/dl    Urobilinogen, UA <2.0 <2.0 mg/dl mg/dl    Bilirubin, UA Negative Negative    Occult Blood, UA Negative Negative    RBC, UA 1-2 None Seen, 1-2 /hpf    WBC, UA 1-2 None Seen, 1-2 /hpf    Epithelial Cells Occasional None Seen, Occasional /hpf    Bacteria, UA None Seen None Seen, Occasional /hpf

## 2023-12-15 NOTE — PROGRESS NOTES
NEPHROLOGY OFFICE VISIT   Omar Delong 79 y.o. female MRN: [id number]  12/15/2023    Reason for Visit: CKD stage III    ASSESSMENT and PLAN:    I had the pleasure of seeing Ms Josefina Verma today in the renal clinic for the continued management of CKD III.     2/2020 - February with 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 on February 11th. Completed course of Keppra for seizure prophylaxis. 3/30/2020 - losartan was increased to 50 mg twice a day from 50 in the morning and 25 in the evening due to high blood pressures     4/3/2020 - spironolactone 12.5 mg daily was started due to continued high blood pressure     4/2020 - patient had presented for replacement current of cranioplasty on April 6. And subsequently was admitted to the rehab center postoperatively      March 2021- patient was admitted to hospital with not feeling well and   Headache. CT scan of the head with no acute intracranial pathology. The other symptoms of memory loss were also present. Patient was referred to geriatrician. April 2021 - sciatic pain. Received brief steroid course     5/2021 - back pain. Went to ER. Given pred taper     5/2023 - neck pain. CT of spine -  no cervical spine fracture or traumatic malalignmen     October 2023-patient was in the ER for dizziness and headache. Patient was given normal saline. Had a CAT scan completed. CT of the head no acute intracranial abnormalities with unchanged large chronic encephalomalacia and gliosis in the right temporal lobe in the region of remote hemorrhage. Chest x-ray was unrevealing. Patient was discharged after volume expansion and feeling improved. October 27, 2023-patient to follow-up with cardiology team.  For now mitral valve is being monitored. CT abdpel - no acute findings. Potential gastritis.      78-year-old female with a past medical history of chronic kidney disease, venous stasis, HTN, hypothyroidism, lumbar canal stenosis, Anxiety, GERD, WENDI, neuropathy, Gout, EF 49-66%, Grade 2 diastolic dysfunction who presents for follow up for CKD. To note, patient's  has now passed away in August.     1) CKD III - patient also has a long standing history of HTN. Creatinine in 2013, 0.9 mg/dL. Cr early 2016 was 0.80-0.9 mg/dL; then increased starting in august 2016 to 2.3 mg/dL and has fluctuated since. CT scan in 2016, kidneys appearing normal at that time. Etiology of CKD may be long standing HTN and ATN. Baseline Cr ~ 1.1 -1.3 mg/dL     - Urine eos 0%;   - A1c controlled prior  at 5.6% 12/2022  - UPCR too low to quant (6/2023)  - UA bland 6/2023  -SPEP unrevealing  -UPEP unrevealing  - C3, C4 unrevealing  - Renal u/s with R 10.3 cm, L 10.4 cm, renal cortex 1 cm b/l; both kidneys slightly reduced in size; lower pole of R kidney is non obstructing calculus  - 10/2023 - CT without hydro on kidney  - Pt follows with Urology team for nephrolithiasis. - No NSAIDs, the patient is not currently taking NSAID  -nuc stress test per Cardiology in Jan 2019 unrevealing  - repeat renal u/s 1/2019 - unrevealing with exception of renal cortical atrophy; but also 6 mm non shadowing calculus. Overall, patient is seen for appointment December 2023. Blood pressure stable 670/73 systolic. And appropriate. Lab work from December 7 reviewed. Creatinine slightly above baseline of 1.46 mg/dL. Electrolytes are stable and appropriate. Hemoglobin at goal.   and appropriate for now. Urinalysis is bland. UPCR is 0.07. Patient has been having intermittent episodes of dizziness. She brought in 1 blood pressure reading when she had this issue and the blood pressures were low again at 732 systolic. She has lost weight and has not been eating well overall per her daughter who is present. Patient is euvolemic. I will lower Bumex today slightly with parameters for the patient to call and increase Bumex if needed.   I am concerned that she may be becoming volume depleted as the cause of her dizziness. She does have a history of mitral regurg which is being followed closely with cardiology team.     Plan:     -Lower Bumex to 1 mg once a day from 2 mg once a day  - Parameters given to the patient to increase Bumex if needed  - Continue amlodipine 5 mg daily, spironolactone 12.5 mg daily, metoprolol 25 mg twice daily, losartan 50 in the morning and 20 5 in the evening  - Lab work in 2 months  - Appointment in 2 months for close follow-up for volume check  - Overall today patient does not have any dizziness or lightheadedness and blood pressures are appropriate. But she has lost weight, is euvolemic and will lower Bumex slightly given the intermittent episodes of dizziness at home and recent ER visit findings. 2) SOB - Volume - follows with Cardiology     - on Bumex. Will lower slightly to 1 mg once a day on December 15, 2023 as the patient may be becoming hypovolemic     3) HTN -      - cont losartan, bumex and amlodipine, spironolactone  -  due to bradycardia, beta-blocker was held prior but given new onset atrial fibrillation around May 2022, patient was restarted on metoprolol per Cardiology team  - December 2023-lower Bumex to 1 mg once daily due to concern for hypovolemia     4) hypothyroid - as per Primary Care Physician     5) HPL - Primary Care following. 6) Electrolytes - stable     7) MBD -     - Vit D 50.7 in nov 2019 --> 40.8 March 2022  - PTH improved 89 in nov 2019 --> 78.6 in June 2020 --> 88 3/8/2022 --> 136 December 2022 -->109 December 2023. Appropriate for now. 8) gout -      -monitor for flare     9) back pain - follows with Pain management     - pain sig improved with inj     10) Colonoscopy in feb with internal hemorrhoids and polyp removed.       11) alk phos elevation     -improved     12) Anemia - Hb stable     -hemoglobin stable and at goal     13) Mitral valve calcification     - moderate to severe mitral regurgitation  -eventually may require further evaluation. Being monitored for now conservatively. - per Cardiology team     14) elevated D-dimer prior-patient was given duplex of lower extremities which was unrevealing prior     15) knee pain after fall in august 2020     - saw Orthopedic team  - steroid injection was given     16) intracranial hemorrhage with mass effect requiring craniotomy on 2/11/2020     - now is status post replacement of cranioplasty in April 2020     17)  Anxiety- patient was started on Cymbalta. PCP is attempting off of gabapentin     - there were periods of not taking cymbalta     18)   Zoster infection in November 2020-treated with Valtrex per primary team     19) a fib - new onset in May 2022     -was advised to have Holter monitor. There is concern for tachy-hola syndrome initially and Holter monitor showed periods of atrial fibrillation with RVR and nonsustained ventricular tachycardia. Was started on low-dose beta-blocker with metoprolol.  -in July metoprolol was titrated further to 25 mg twice a day  -was started on Eliquis 2.5 mg twice a day. Lower dose was chosen due to high risk factors. It was a pleasure evaluating your patient in the office today. Thank you for allowing our team to participate in the care of Ms Tres Schmitt. Please do not hesitate to contact our team if further issues/questions shall arise in the interim. No problem-specific Assessment & Plan notes found for this encounter. HPI:    Pt denies fevers, chills, nausea, vomiting. No URI. Currently without dizziness or lightheadedness but is having intermittent episodes at home.     PATIENT INSTRUCTIONS:    Patient Instructions   1) Avoid NSAIDS - (Example - motrin, advil, ibuprofen, aleve, exederin, etc)  2) Always follow a low salt diet  3) If you have any issues with trouble with nausea, vomiting, urinating, blood in the urine, food tasting like metal, confusion, weakness, fatigue that is worsening, or any other questions, please call right away. 4) Please continue to follow regularly with your family physician and any other specialist that you are advised to see and continue to follow their recommendations  5) check blood pressures twice daily and please send log  6) please cut your bumex in half and take 1/2 tablet daily  - check your weight once daily - if your weight goes up by more than 3 lbs in one day or 5 lbs in one week then increase bumex to one tablet of 2 mg tab daily  7) check your BP twice daily and send log in 2 weeks  8) appt in 2 months for vol check  9) labwork in 2 months  10) no changes to your other medications          OBJECTIVE:  Current Weight: Weight - Scale: 70.6 kg (155 lb 9.6 oz)  Vitals:    12/15/23 1045   BP: 128/72   BP Location: Left arm   Patient Position: Sitting   Cuff Size: Standard   Pulse: 72   SpO2: 100%   Weight: 70.6 kg (155 lb 9.6 oz)   Height: 5' 1" (1.549 m)    Body mass index is 29.4 kg/m². REVIEW OF SYSTEMS:    Review of Systems   Constitutional: Negative. Negative for fatigue. HENT: Negative. Eyes: Negative. Respiratory: Negative. Negative for shortness of breath. Cardiovascular: Negative. Negative for leg swelling. Gastrointestinal: Negative. Endocrine: Negative. Genitourinary: Negative. Negative for difficulty urinating. Musculoskeletal: Negative. Skin: Negative. Allergic/Immunologic: Negative. Neurological: Negative. Hematological: Negative. Psychiatric/Behavioral: Negative. All other systems reviewed and are negative. PHYSICAL EXAM:      Physical Exam  Vitals and nursing note reviewed. Constitutional:       General: She is not in acute distress. Appearance: She is well-developed. She is not diaphoretic. HENT:      Head: Normocephalic and atraumatic. Eyes:      General: No scleral icterus. Right eye: No discharge. Left eye: No discharge.       Conjunctiva/sclera: Conjunctivae normal.   Neck:      Vascular: No JVD. Cardiovascular:      Rate and Rhythm: Normal rate and regular rhythm. Heart sounds: Murmur heard. No friction rub. No gallop. Pulmonary:      Effort: Pulmonary effort is normal. No respiratory distress. Breath sounds: Normal breath sounds. No wheezing or rales. Abdominal:      General: Bowel sounds are normal. There is no distension. Palpations: Abdomen is soft. Tenderness: There is no abdominal tenderness. There is no rebound. Musculoskeletal:         General: No tenderness or deformity. Normal range of motion. Cervical back: Normal range of motion and neck supple. Right lower leg: No edema. Left lower leg: No edema. Skin:     General: Skin is warm and dry. Coloration: Skin is not pale. Findings: No erythema or rash. Neurological:      Mental Status: She is alert and oriented to person, place, and time. Coordination: Coordination normal.   Psychiatric:         Behavior: Behavior normal.         Thought Content:  Thought content normal.         Judgment: Judgment normal.         Medications:    Current Outpatient Medications:     amLODIPine (NORVASC) 10 mg tablet, Take 0.5 tablets (5 mg total) by mouth daily, Disp: 90 tablet, Rfl: 0    apixaban (Eliquis) 2.5 mg, Take 1 tablet (2.5 mg total) by mouth 2 (two) times a day, Disp: 180 tablet, Rfl: 3    atorvastatin (LIPITOR) 40 mg tablet, Take 1 tablet (40 mg total) by mouth every evening, Disp: 90 tablet, Rfl: 1    bumetanide (BUMEX) 2 mg tablet, Take 0.5 tablets (1 mg total) by mouth daily, Disp: , Rfl:     Diclofenac Sodium (VOLTAREN) 1 %, Apply 2 g topically in the morning APPLY NO MORE THAN 3 DAYS IN A ROW THEN TAKE A BREAK FOR ONE WEEK FROM USE., Disp: 100 g, Rfl: 1    levothyroxine 88 mcg tablet, TAKE 1 TABLET EVERY DAY, Disp: 90 tablet, Rfl: 1    losartan (COZAAR) 25 mg tablet, TAKE 2 TABLETS IN THE MORNING AND TAKE 1 TABLET IN THE EVENING, Disp: 270 tablet, Rfl: 10    metoprolol tartrate (LOPRESSOR) 25 mg tablet, Take 0.5 tablets (12.5 mg total) by mouth every 12 (twelve) hours, Disp: 45 tablet, Rfl: 3    pantoprazole (PROTONIX) 20 mg tablet, TAKE 1 TABLET EVERY DAY, Disp: 90 tablet, Rfl: 1    spironolactone (ALDACTONE) 25 mg tablet, Take 0.5 tablets (12.5 mg total) by mouth daily, Disp: 45 tablet, Rfl: 3    gabapentin (NEURONTIN) 100 mg capsule, Take 1 tablet at bedtime. May increase to 2 tablets after 3 days (Patient not taking: Reported on 12/15/2023), Disp: 90 capsule, Rfl: 1    ketoconazole (NIZORAL) 2 % cream, Apply topically daily, Disp: 60 g, Rfl: 1    senna-docusate sodium (SENOKOT S) 8.6-50 mg per tablet, Take 1 tablet by mouth daily (Patient not taking: Reported on 12/15/2023), Disp: 20 tablet, Rfl: 1    Laboratory Results:        Invalid input(s): "ALBUMIN"    Results for orders placed or performed in visit on 12/07/23   Comprehensive metabolic panel   Result Value Ref Range    Sodium 143 135 - 147 mmol/L    Potassium 4.1 3.5 - 5.3 mmol/L    Chloride 105 96 - 108 mmol/L    CO2 27 21 - 32 mmol/L    ANION GAP 11 mmol/L    BUN 39 (H) 5 - 25 mg/dL    Creatinine 1.46 (H) 0.60 - 1.30 mg/dL    Glucose, Fasting 85 65 - 99 mg/dL    Calcium 9.5 8.4 - 10.2 mg/dL    AST 18 13 - 39 U/L    ALT 11 7 - 52 U/L    Alkaline Phosphatase 82 34 - 104 U/L    Total Protein 6.6 6.4 - 8.4 g/dL    Albumin 4.1 3.5 - 5.0 g/dL    Total Bilirubin 0.47 0.20 - 1.00 mg/dL    eGFR 32 ml/min/1.73sq m   Hemoglobin A1C   Result Value Ref Range    Hemoglobin A1C 6.0 (H) Normal 4.0-5.6%; PreDiabetic 5.7-6.4%;  Diabetic >=6.5%; Glycemic control for adults with diabetes <7.0% %     mg/dl   Lipid panel   Result Value Ref Range    Cholesterol 105 See Comment mg/dL    Triglycerides 89 See Comment mg/dL    HDL, Direct 50 >=50 mg/dL    LDL Calculated 37 0 - 100 mg/dL    Non-HDL-Chol (CHOL-HDL) 55 mg/dl   TSH, 3rd generation with Free T4 reflex   Result Value Ref Range    TSH 3RD GENERATON 1.741 0.450 - 4.500 uIU/mL   CBC   Result Value Ref Range    WBC 6.06 4.31 - 10.16 Thousand/uL    RBC 4.19 3.81 - 5.12 Million/uL    Hemoglobin 12.2 11.5 - 15.4 g/dL    Hematocrit 38.7 34.8 - 46.1 %    MCV 92 82 - 98 fL    MCH 29.1 26.8 - 34.3 pg    MCHC 31.5 31.4 - 37.4 g/dL    RDW 15.0 11.6 - 15.1 %    Platelets 215 647 - 091 Thousands/uL    MPV 11.6 8.9 - 12.7 fL   Magnesium   Result Value Ref Range    Magnesium 1.8 (L) 1.9 - 2.7 mg/dL   Phosphorus   Result Value Ref Range    Phosphorus 3.7 2.3 - 4.1 mg/dL   Protein / creatinine ratio, urine   Result Value Ref Range    Creatinine, Ur 115.7 Reference range not established. mg/dL    Protein Urine Random 8 Reference range not established. mg/dL    Prot/Creat Ratio, Ur 0.07 0.00 - 0.10   PTH, intact   Result Value Ref Range    .4 (H) 12.0 - 88.0 pg/mL   Urinalysis with microscopic   Result Value Ref Range    Color, UA Light Yellow     Clarity, UA Clear     Specific Gravity, UA 1.020 1.003 - 1.030    pH, UA 5.5 4.5, 5.0, 5.5, 6.0, 6.5, 7.0, 7.5, 8.0    Leukocytes, UA Negative Negative    Nitrite, UA Negative Negative    Protein, UA Trace (A) Negative mg/dl    Glucose, UA Negative Negative mg/dl    Ketones, UA Negative Negative mg/dl    Urobilinogen, UA <2.0 <2.0 mg/dl mg/dl    Bilirubin, UA Negative Negative    Occult Blood, UA Negative Negative    RBC, UA 1-2 None Seen, 1-2 /hpf    WBC, UA 1-2 None Seen, 1-2 /hpf    Epithelial Cells Occasional None Seen, Occasional /hpf    Bacteria, UA None Seen None Seen, Occasional /hpf

## 2023-12-15 NOTE — PATIENT INSTRUCTIONS
1) Avoid NSAIDS - (Example - motrin, advil, ibuprofen, aleve, exederin, etc)  2) Always follow a low salt diet  3) If you have any issues with trouble with nausea, vomiting, urinating, blood in the urine, food tasting like metal, confusion, weakness, fatigue that is worsening, or any other questions, please call right away.   4) Please continue to follow regularly with your family physician and any other specialist that you are advised to see and continue to follow their recommendations  5) check blood pressures twice daily and please send log  6) please cut your bumex in half and take 1/2 tablet daily  - check your weight once daily - if your weight goes up by more than 3 lbs in one day or 5 lbs in one week then increase bumex to one tablet of 2 mg tab daily  7) check your BP twice daily and send log in 2 weeks  8) appt in 2 months for vol check  9) labwork in 2 months  10) no changes to your other medications

## 2023-12-18 ENCOUNTER — OFFICE VISIT (OUTPATIENT)
Age: 83
End: 2023-12-18
Payer: MEDICARE

## 2023-12-18 VITALS
WEIGHT: 155 LBS | SYSTOLIC BLOOD PRESSURE: 128 MMHG | DIASTOLIC BLOOD PRESSURE: 72 MMHG | BODY MASS INDEX: 29.27 KG/M2 | RESPIRATION RATE: 17 BRPM | HEIGHT: 61 IN

## 2023-12-18 DIAGNOSIS — M77.42 METATARSALGIA OF BOTH FEET: Primary | ICD-10-CM

## 2023-12-18 DIAGNOSIS — M77.41 METATARSALGIA OF BOTH FEET: Primary | ICD-10-CM

## 2023-12-18 DIAGNOSIS — I70.209 PERIPHERAL ARTERIOSCLEROSIS (HCC): ICD-10-CM

## 2023-12-18 DIAGNOSIS — M54.16 RADICULOPATHY OF LUMBAR REGION: ICD-10-CM

## 2023-12-18 DIAGNOSIS — B35.9 DERMATOPHYTOSIS: ICD-10-CM

## 2023-12-18 DIAGNOSIS — B35.1 ONYCHOMYCOSIS: ICD-10-CM

## 2023-12-18 PROCEDURE — 99213 OFFICE O/P EST LOW 20 MIN: CPT | Performed by: PODIATRIST

## 2023-12-18 NOTE — PROGRESS NOTES
Assessment/Plan: Metatarsalgia secondary to radiculopathy.  Pain upon ambulation.  Mycosis of nail.  Dermatophytosis.  Patient with peripheral artery disease.    Plan.  Chart reviewed.  PCP notes reviewed.  Patient examined.  Patient advised on condition.  At this time patient remain on Cymbalta as directed.  In addition she will use over-the-counter antifungal.  Today all mycotic nails debrided mechanically without pain or complication.  Aftercare instruction given.  Return for follow-up.         Diagnoses and all orders for this visit:    Metatarsalgia of both feet    Radiculopathy of lumbar region    Dermatophytosis    Onychomycosis    Peripheral arteriosclerosis (HCC)          Subjective: Patient has pain in her feet.  She has low back pain.  She gets some relief from Cymbalta.  She also complains of ingrown toenails.  No history of trauma    No Known Allergies      Current Outpatient Medications:     amLODIPine (NORVASC) 10 mg tablet, Take 0.5 tablets (5 mg total) by mouth daily, Disp: 90 tablet, Rfl: 0    apixaban (Eliquis) 2.5 mg, Take 1 tablet (2.5 mg total) by mouth 2 (two) times a day, Disp: 180 tablet, Rfl: 3    atorvastatin (LIPITOR) 40 mg tablet, Take 1 tablet (40 mg total) by mouth every evening, Disp: 90 tablet, Rfl: 1    bumetanide (BUMEX) 2 mg tablet, Take 0.5 tablets (1 mg total) by mouth daily, Disp: , Rfl:     Diclofenac Sodium (VOLTAREN) 1 %, Apply 2 g topically in the morning APPLY NO MORE THAN 3 DAYS IN A ROW THEN TAKE A BREAK FOR ONE WEEK FROM USE., Disp: 100 g, Rfl: 1    levothyroxine 88 mcg tablet, TAKE 1 TABLET EVERY DAY, Disp: 90 tablet, Rfl: 1    losartan (COZAAR) 25 mg tablet, TAKE 2 TABLETS IN THE MORNING AND TAKE 1 TABLET IN THE EVENING, Disp: 270 tablet, Rfl: 10    metoprolol tartrate (LOPRESSOR) 25 mg tablet, Take 0.5 tablets (12.5 mg total) by mouth every 12 (twelve) hours, Disp: 45 tablet, Rfl: 3    pantoprazole (PROTONIX) 20 mg tablet, TAKE 1 TABLET EVERY DAY, Disp: 90 tablet,  Rfl: 1    spironolactone (ALDACTONE) 25 mg tablet, Take 0.5 tablets (12.5 mg total) by mouth daily, Disp: 45 tablet, Rfl: 3    gabapentin (NEURONTIN) 100 mg capsule, Take 1 tablet at bedtime.  May increase to 2 tablets after 3 days (Patient not taking: Reported on 12/15/2023), Disp: 90 capsule, Rfl: 1    ketoconazole (NIZORAL) 2 % cream, Apply topically daily, Disp: 60 g, Rfl: 1    senna-docusate sodium (SENOKOT S) 8.6-50 mg per tablet, Take 1 tablet by mouth daily (Patient not taking: Reported on 12/18/2023), Disp: 20 tablet, Rfl: 1    Patient Active Problem List   Diagnosis    Benign essential hypertension    Chronic diastolic (congestive) heart failure (HCC)    Hypothyroidism    Arthropathy of knee    Hallux abductovalgus with bunions, unspecified laterality    CKD (chronic kidney disease), stage III (HCC)    Diverticulitis of colon    Chronic gout without tophus    Hiatal hernia    Hyperlipemia    Hyperuricemia    Nephrolithiasis    WENDI (obstructive sleep apnea)    Pseudogout of left wrist    Gastroesophageal reflux disease without esophagitis    Exertional shortness of breath    Prediabetes    Status post right frontotemporal replacement cranioplasty    BMI 38.0-38.9,adult    Sciatica of left side    Spinal stenosis of lumbar region with neurogenic claudication    Cervical radiculopathy    Paroxysmal atrial fibrillation (HCC)    PAC (premature atrial contraction)          Patient ID: Rosalind Wise is a 83 y.o. female.    HPI    The following portions of the patient's history were reviewed and updated as appropriate:     family history includes Arthritis in her family and mother; Coronary artery disease in her mother; Diabetes in her brother, mother, and son; Hypertension in her father and mother.      reports that she has never smoked. She has never used smokeless tobacco. She reports current alcohol use. She reports that she does not use drugs.    Vitals:    12/18/23 1022   BP: 128/72   Resp: 17        Review of Systems      Objective:  Patient's shoes and socks removed.   Foot ExamPhysical Exam      Physical Exam   Left Foot: Appearance: Normal except as noted: excessive pronation-- and-- pes planus.    Right Foot: Appearance: Normal except as noted: excessive pronation-- and-- pes planus. Tenderness: None except the calcaneous,-- medial calcaneous-- and-- insertion of the plantar fascia.  Patient has an enlarged hallux valgus deformity with inflamed bunion.  Left Ankle: Appearance: Normal except ecchymosis-- and-- swelling laterally. ROM: limited ROM in all planes    Right Ankle: ROM: limited ROM in all planes Motor: diffuse weakness.  Pain with palpation anterior lateral aspect right ankle joint mortise.    Neurological Exam: performed. Light touch was intact bilaterally. Vibratory sensation was intact bilaterally. Response to monofilament test was intact bilaterally. Deep tendon reflexes: patellar reflex present bilaterally-- and-- achilles reflex present bilaterally.    Vascular Exam: performed Dorsalis pedis pulses were 1/4 bilaterally. Posterior tibial pulses were 1/4 bilaterally. Elevation Pallor: diminished bilaterally. Capillary refill time was greater than 3 seconds bilaterally-- and-- Q9 findings bilateral. Negative digital hair noted. Positive abnormal cooling bilateral. Edema: moderate bilaterally-- and-- 6/7 pitting edema. Negative Homans sign.    Toenails: All of the toenails were elongated,-- hypertrophied,-- discolored-- and-- Mycotic with onychogryphosis. Note is made of bilateral tinea pedis in moccasin foot. Distribution.         Socks and shoes removed, Right Foot Findings: swollen, erythematous and dry.      The sensory exam showed diminished vibratory sensation at the level of the toes. Diminished tactile sensation with monofilament testing throughout the right foot.      Socks and shoes removed, Left Foot Findings: swollen, erythematous and dry.      The sensory exam showed  diminished vibratory sensation at the level of the toes. Diminished tactile sensation with monofilament testing throughout the left foot.     Capillary refills findings on the right were delayed in the toes.      Pulses:      1+ in the posterior tibialis on the right      1+ in the dorsalis pedis on the right.      Capillary refills findings on the left were delayed in the toes.      Pulses:      1+ in the posterior tibialis on the left      1+ in the dorsalis pedis on the left.      Assign Risk Category: 2: Loss of protective sensation with or without weakness, deformity, callus, pre-ulcer, or history of ulceration. High risk.    Hyperkeratosis: present on both first toes,-- present on both first sub metatarsals-- and-- Bilateral plantar moccasin tinea pedis noted.    Shoe Gear Evaluation: performed (). Recommendation(s): SAS style.

## 2023-12-27 NOTE — PATIENT INSTRUCTIONS
1) please increase your losartan to 2 tab in AM and 1 tab in PM of the 25 mg tab  2) please check BP daily at home, if above 152 systolic (top number), please call  3) labwork in one month  4) appointment in one month  5) if you do not hear from Dr Albert Clemente office in one week for appt time, please call here  Pt. is A&O X 4. Family at bedside. No complain of pain. RA. VSS. NSR on Tele. NPO after MN for surgery tomorrow. Call light within reach. Pt. Off the unit in the early morning to surgery.    Goal Outcome Evaluation:  Plan of Care Reviewed With: patient  Progress: improving

## 2024-02-01 NOTE — PROGRESS NOTES
----- Message from Fabiano De Leon MD sent at 2/1/2024 11:51 AM CST -----  Pt has a small heel spur. Recommend doing PT and if no help pt should see podiatrist. Thank you    Written by Fabiano De Leon MD on 2/1/2024 11:51 AM CST  Seen by patient Camryn Olmstead on 2/1/2024 12:40 PM   Internal Medicine Progress Note  Patient: Ishmael Forrester  Age/sex: [de-identified] y o  female  Medical Record #: [id number]      ASSESSMENT/PLAN: (Interval History)  Ishmael Forrester is seen and examined and management for following issues:    Rt parietal/temporal ICH; s/p Rt decompressive hemicraniectomy 2/11/20  · Keppra completed  · Helmet when out of bed  · SBP goal < 160      HTN  · Remains stable on Amlodipine/Losartan  Was on a higher dose of losartan as an outpt (25mg 2x daily)  · Continue to hold outpt propranolol 40mg every 12 hours 2/2 bradycardia  · No changes at goal of <610 systolic     CKD stage III; baseline 1 1-1 3  · stable  · Follows with Dr Neida Harris as an outpt      Chronic diastolic heart failure; LVEF 65%, mod-severe MR/mild-mod TR  · Continue Bumex 2mg daily  Was on a higher dose as an outpt (2mg daily except M/W/F take 2mg 2x daily)  · Stable     Leukocytosis  · Likely reactive  · No signs of infx    The above assessment and plan was reviewed and updated as determined by my evaluation of the patient on 2/24/2020      Labs:   Results from last 7 days   Lab Units 02/24/20  0846 02/20/20  0523   WBC Thousand/uL 12 40* 10 28*   HEMOGLOBIN g/dL 10 6* 11 3*   HEMATOCRIT % 34 1* 36 6   PLATELETS Thousands/uL 206 195     Results from last 7 days   Lab Units 02/24/20  0651 02/20/20  0524   SODIUM mmol/L 136 140   POTASSIUM mmol/L 4 3 3 7   CHLORIDE mmol/L 101 105   CO2 mmol/L 29 28   BUN mg/dL 20 15   CREATININE mg/dL 1 03 1 03   CALCIUM mg/dL 9 1 9 2                   Review of Scheduled Meds:    Current Facility-Administered Medications:  acetaminophen 650 mg Oral Q6H PRN Kelly Kya MD   amLODIPine 10 mg Oral Daily Kelly Kay MD   atorvastatin 40 mg Oral QPM Kelly Kay MD   bisacodyl 10 mg Rectal Daily PRN Kelly Kay MD   bumetanide 2 mg Oral Daily Kelly Kay MD   heparin (porcine) 5,000 Units Subcutaneous Q8H [facility] Kelly Kay MD   levothyroxine 112 mcg Oral Early Morning Kelly Kay MD   losartan 25 mg Oral Daily King Yanci MD   melatonin 3 mg Oral HS King Yanci MD   ondansetron 4 mg Oral Q6H PRN King Yanci MD   oxyCODONE 2 5 mg Oral Q6H PRN King Yanci MD   pantoprazole 40 mg Oral Early Morning King Yanci MD   polyethylene glycol 17 g Oral Daily PRN King Yanci MD   senna-docusate sodium 1 tablet Oral BID King Yanci MD       Subjective/ HPI: Patients overnight issues or events were reviewed with nursing or staff during rounds or morning huddle session  No new or overnight issues  Offers no complaints  ROS:   A 10 point ROS was performed; negative except as noted above  Imaging:     No orders to display       *Labs /Radiology studiesReviewed  *Medications Reviewed and reconciled as needed  *Please refer to order section for additional ordered labs studies  *Case discussed with primary attending during morning huddle case rounds    Physical Examination:  Vitals:   Vitals:    02/23/20 1300 02/23/20 2037 02/24/20 0500 02/24/20 0900   BP: 141/61 126/59 160/70 136/60   BP Location: Left arm Right arm Right arm Left arm   Pulse: 84 84 75 80   Resp: 18 18 18    Temp: 97 8 °F (36 6 °C) (!) 97 2 °F (36 2 °C) 98 1 °F (36 7 °C)    TempSrc: Oral Oral Oral    SpO2:  97% 95%    Weight:       Height:           General Appearance: no distress, conversive  HEENT: PERRLA, conjuctiva normal; oropharynx clear; mucous membranes moist   Neck:  Supple, no JVD; no pain with movement  Lungs: CTA, normal respiratory effort, no retractions, expiratory effort normal  CV: regular rate and rhythm; no rubs/murmurs/gallops, PMI normal   ABD: soft; ND/NT; +BS  EXT: no edema  Skin: normal turgor, normal texture, no rashes  Psych: affect normal, mood normal  Neuro: AAO; RASCON 5/5      The above physical exam was reviewed and updated as determined by my evaluation of the patient on 2/24/2020      Invasive Devices     None                    VTE Pharmacologic Prophylaxis: Heparin  Code Status: Level 3 - DNAR and DNI  Current Length of Stay: 6 day(s)      Total time spent:  30 minutes with more than 50% spent counseling/coordinating care  Counseling includes discussion with patient re: progress  and discussion with patient of his/her current medical state/information  Coordination of patient's care was performed in conjunction with primary service  Time invested included review of patient's labs, vitals, and management of their comorbidities with continued monitoring  In addition, this patient was discussed with medical team including physician and advanced extenders  The care of the patient was extensively discussed and appropriate treatment plan was formulated unique for this patient  ** Please Note:  voice to text software may have been used in the creation of this document   Although proof errors in transcription or interpretation are a potential of such software**

## 2024-02-12 ENCOUNTER — TELEPHONE (OUTPATIENT)
Dept: NEPHROLOGY | Facility: CLINIC | Age: 84
End: 2024-02-12

## 2024-02-12 DIAGNOSIS — I50.33 ACUTE ON CHRONIC DIASTOLIC CONGESTIVE HEART FAILURE (HCC): ICD-10-CM

## 2024-02-12 RX ORDER — BUMETANIDE 1 MG/1
1 TABLET ORAL DAILY
Qty: 90 TABLET | Refills: 3 | Status: SHIPPED | OUTPATIENT
Start: 2024-02-12

## 2024-02-12 RX ORDER — BUMETANIDE 1 MG/1
1 TABLET ORAL DAILY
Qty: 90 TABLET | Refills: 3 | Status: SHIPPED | OUTPATIENT
Start: 2024-02-12 | End: 2024-02-12 | Stop reason: SDUPTHER

## 2024-02-12 NOTE — TELEPHONE ENCOUNTER
Per daughter in law appointment will need to be rescheduled, she can't make that time.   Also pt can't cut the Bumex in half,  prescribed the 1 mg tab.

## 2024-02-16 ENCOUNTER — TELEPHONE (OUTPATIENT)
Dept: NEPHROLOGY | Facility: CLINIC | Age: 84
End: 2024-02-16

## 2024-02-27 ENCOUNTER — OFFICE VISIT (OUTPATIENT)
Age: 84
End: 2024-02-27
Payer: MEDICARE

## 2024-02-27 VITALS — WEIGHT: 155 LBS | BODY MASS INDEX: 29.27 KG/M2 | RESPIRATION RATE: 17 BRPM | HEIGHT: 61 IN

## 2024-02-27 DIAGNOSIS — M77.41 METATARSALGIA OF BOTH FEET: Primary | ICD-10-CM

## 2024-02-27 DIAGNOSIS — M25.572 PAIN IN JOINTS OF BOTH FEET: ICD-10-CM

## 2024-02-27 DIAGNOSIS — M54.16 RADICULOPATHY OF LUMBAR REGION: ICD-10-CM

## 2024-02-27 DIAGNOSIS — B35.9 DERMATOPHYTOSIS: ICD-10-CM

## 2024-02-27 DIAGNOSIS — M25.571 PAIN IN JOINTS OF BOTH FEET: ICD-10-CM

## 2024-02-27 DIAGNOSIS — I70.209 PERIPHERAL ARTERIOSCLEROSIS (HCC): ICD-10-CM

## 2024-02-27 DIAGNOSIS — M77.42 METATARSALGIA OF BOTH FEET: Primary | ICD-10-CM

## 2024-02-27 PROCEDURE — 99213 OFFICE O/P EST LOW 20 MIN: CPT | Performed by: PODIATRIST

## 2024-02-27 NOTE — PROGRESS NOTES
Assessment/Plan: Metatarsalgia secondary to radiculopathy.  Pain upon ambulation.  Mycosis of nail.  Dermatophytosis.  Patient with peripheral artery disease.     Plan.  Chart reviewed.  PCP notes reviewed.  Lab work reviewed . patient examined.  Patient advised on condition.  At this time patient remain on Cymbalta as directed.  In addition she will use over-the-counter antifungal.  For xerosis, patient will use moisturizer after bathing.  Shoe size evaluated.  Shoe style recommended.  Aftercare instruction given.  Return for follow-up.            Diagnoses and all orders for this visit:     Metatarsalgia of both feet     Radiculopathy of lumbar region     Dermatophytosis     Onychomycosis     Peripheral arteriosclerosis (HCC)            Subjective: Patient has pain in her feet.  She has low back pain.  She gets some relief from Cymbalta.  She also complains of ingrown toenails.  No history of trauma     No Known Allergies        Current Outpatient Medications:     amLODIPine (NORVASC) 10 mg tablet, Take 0.5 tablets (5 mg total) by mouth daily, Disp: 90 tablet, Rfl: 0    apixaban (Eliquis) 2.5 mg, Take 1 tablet (2.5 mg total) by mouth 2 (two) times a day, Disp: 180 tablet, Rfl: 3    atorvastatin (LIPITOR) 40 mg tablet, Take 1 tablet (40 mg total) by mouth every evening, Disp: 90 tablet, Rfl: 1    bumetanide (BUMEX) 2 mg tablet, Take 0.5 tablets (1 mg total) by mouth daily, Disp: , Rfl:     Diclofenac Sodium (VOLTAREN) 1 %, Apply 2 g topically in the morning APPLY NO MORE THAN 3 DAYS IN A ROW THEN TAKE A BREAK FOR ONE WEEK FROM USE., Disp: 100 g, Rfl: 1    levothyroxine 88 mcg tablet, TAKE 1 TABLET EVERY DAY, Disp: 90 tablet, Rfl: 1    losartan (COZAAR) 25 mg tablet, TAKE 2 TABLETS IN THE MORNING AND TAKE 1 TABLET IN THE EVENING, Disp: 270 tablet, Rfl: 10    metoprolol tartrate (LOPRESSOR) 25 mg tablet, Take 0.5 tablets (12.5 mg total) by mouth every 12 (twelve) hours, Disp: 45 tablet, Rfl: 3    pantoprazole  (PROTONIX) 20 mg tablet, TAKE 1 TABLET EVERY DAY, Disp: 90 tablet, Rfl: 1    spironolactone (ALDACTONE) 25 mg tablet, Take 0.5 tablets (12.5 mg total) by mouth daily, Disp: 45 tablet, Rfl: 3    gabapentin (NEURONTIN) 100 mg capsule, Take 1 tablet at bedtime.  May increase to 2 tablets after 3 days (Patient not taking: Reported on 12/15/2023), Disp: 90 capsule, Rfl: 1    ketoconazole (NIZORAL) 2 % cream, Apply topically daily, Disp: 60 g, Rfl: 1    senna-docusate sodium (SENOKOT S) 8.6-50 mg per tablet, Take 1 tablet by mouth daily (Patient not taking: Reported on 12/18/2023), Disp: 20 tablet, Rfl: 1         Patient Active Problem List   Diagnosis    Benign essential hypertension    Chronic diastolic (congestive) heart failure (HCC)    Hypothyroidism    Arthropathy of knee    Hallux abductovalgus with bunions, unspecified laterality    CKD (chronic kidney disease), stage III (HCC)    Diverticulitis of colon    Chronic gout without tophus    Hiatal hernia    Hyperlipemia    Hyperuricemia    Nephrolithiasis    WENDI (obstructive sleep apnea)    Pseudogout of left wrist    Gastroesophageal reflux disease without esophagitis    Exertional shortness of breath    Prediabetes    Status post right frontotemporal replacement cranioplasty    BMI 38.0-38.9,adult    Sciatica of left side    Spinal stenosis of lumbar region with neurogenic claudication    Cervical radiculopathy    Paroxysmal atrial fibrillation (HCC)    PAC (premature atrial contraction)             Patient ID: Rosalind Wise is a 83 y.o. female.     HPI     The following portions of the patient's history were reviewed and updated as appropriate:      family history includes Arthritis in her family and mother; Coronary artery disease in her mother; Diabetes in her brother, mother, and son; Hypertension in her father and mother.       reports that she has never smoked. She has never used smokeless tobacco. She reports current alcohol use. She reports that she does  not use drugs.         Vitals:     12/18/23 1022   BP: 128/72   Resp: 17         Review of Systems       Objective:  Patient's shoes and socks removed.   Foot ExamPhysical Exam       Physical Exam   Left Foot: Appearance: Normal except as noted: excessive pronation-- and-- pes planus.    Right Foot: Appearance: Normal except as noted: excessive pronation-- and-- pes planus. Tenderness: None except the calcaneous,-- medial calcaneous-- and-- insertion of the plantar fascia.  Patient has an enlarged hallux valgus deformity with inflamed bunion.  Left Ankle: Appearance: Normal except ecchymosis-- and-- swelling laterally. ROM: limited ROM in all planes    Right Ankle: ROM: limited ROM in all planes Motor: diffuse weakness.  Pain with palpation anterior lateral aspect right ankle joint mortise.    Neurological Exam: performed. Light touch was intact bilaterally. Vibratory sensation was intact bilaterally. Response to monofilament test was intact bilaterally. Deep tendon reflexes: patellar reflex present bilaterally-- and-- achilles reflex present bilaterally.    Vascular Exam: performed Dorsalis pedis pulses were 1/4 bilaterally. Posterior tibial pulses were 1/4 bilaterally. Elevation Pallor: diminished bilaterally. Capillary refill time was greater than 3 seconds bilaterally-- and-- Q9 findings bilateral. Negative digital hair noted. Positive abnormal cooling bilateral. Edema: moderate bilaterally-- and-- 6/7 pitting edema. Negative Homans sign.    Toenails: All of the toenails were elongated,-- hypertrophied,-- discolored-- and-- Mycotic with onychogryphosis. Note is made of bilateral tinea pedis in moccasin foot. Distribution.         Socks and shoes removed, Right Foot Findings: swollen, erythematous and dry.      The sensory exam showed diminished vibratory sensation at the level of the toes. Diminished tactile sensation with monofilament testing throughout the right foot.      Socks and shoes removed, Left Foot  Findings: swollen, erythematous and dry.      The sensory exam showed diminished vibratory sensation at the level of the toes. Diminished tactile sensation with monofilament testing throughout the left foot.     Capillary refills findings on the right were delayed in the toes.      Pulses:      1+ in the posterior tibialis on the right      1+ in the dorsalis pedis on the right.      Capillary refills findings on the left were delayed in the toes.      Pulses:      1+ in the posterior tibialis on the left      1+ in the dorsalis pedis on the left.      Assign Risk Category: 2: Loss of protective sensation with or without weakness, deformity, callus, pre-ulcer, or history of ulceration. High risk.    Hyperkeratosis: present on both first toes,-- present on both first sub metatarsals-- and-- Bilateral plantar moccasin tinea pedis noted.    Shoe Gear Evaluation: performed (). Recommendation(s): SAS style.

## 2024-03-20 ENCOUNTER — TELEPHONE (OUTPATIENT)
Age: 84
End: 2024-03-20

## 2024-03-22 ENCOUNTER — APPOINTMENT (EMERGENCY)
Dept: RADIOLOGY | Facility: HOSPITAL | Age: 84
DRG: 918 | End: 2024-03-22
Payer: MEDICARE

## 2024-03-22 ENCOUNTER — HOSPITAL ENCOUNTER (INPATIENT)
Facility: HOSPITAL | Age: 84
LOS: 4 days | DRG: 918 | End: 2024-03-26
Attending: EMERGENCY MEDICINE | Admitting: INTERNAL MEDICINE
Payer: MEDICARE

## 2024-03-22 DIAGNOSIS — I49.1 PAC (PREMATURE ATRIAL CONTRACTION): ICD-10-CM

## 2024-03-22 DIAGNOSIS — T14.91XA SUICIDE ATTEMPT (HCC): ICD-10-CM

## 2024-03-22 DIAGNOSIS — R06.02 EXERTIONAL SHORTNESS OF BREATH: ICD-10-CM

## 2024-03-22 DIAGNOSIS — F32.A DEPRESSION: ICD-10-CM

## 2024-03-22 DIAGNOSIS — I48.91 A-FIB (HCC): ICD-10-CM

## 2024-03-22 DIAGNOSIS — E79.0 HYPERURICEMIA: ICD-10-CM

## 2024-03-22 DIAGNOSIS — G47.33 OSA (OBSTRUCTIVE SLEEP APNEA): ICD-10-CM

## 2024-03-22 DIAGNOSIS — M1A.0720 IDIOPATHIC CHRONIC GOUT OF LEFT ANKLE WITHOUT TOPHUS: ICD-10-CM

## 2024-03-22 DIAGNOSIS — T50.902A OD (OVERDOSE OF DRUG), INTENTIONAL SELF-HARM, INITIAL ENCOUNTER (HCC): Primary | ICD-10-CM

## 2024-03-22 DIAGNOSIS — N18.30 STAGE 3 CHRONIC KIDNEY DISEASE, UNSPECIFIED WHETHER STAGE 3A OR 3B CKD (HCC): ICD-10-CM

## 2024-03-22 DIAGNOSIS — I50.32 CHRONIC DIASTOLIC (CONGESTIVE) HEART FAILURE (HCC): ICD-10-CM

## 2024-03-22 DIAGNOSIS — K21.9 GASTROESOPHAGEAL REFLUX DISEASE WITHOUT ESOPHAGITIS: ICD-10-CM

## 2024-03-22 DIAGNOSIS — N20.0 NEPHROLITHIASIS: ICD-10-CM

## 2024-03-22 DIAGNOSIS — Z00.8 MEDICAL CLEARANCE FOR PSYCHIATRIC ADMISSION: ICD-10-CM

## 2024-03-22 DIAGNOSIS — I10 BENIGN ESSENTIAL HYPERTENSION: ICD-10-CM

## 2024-03-22 DIAGNOSIS — E78.5 HYPERLIPIDEMIA, UNSPECIFIED HYPERLIPIDEMIA TYPE: ICD-10-CM

## 2024-03-22 DIAGNOSIS — E03.9 HYPOTHYROIDISM, UNSPECIFIED TYPE: ICD-10-CM

## 2024-03-22 LAB
2HR DELTA HS TROPONIN: 1 NG/L
4HR DELTA HS TROPONIN: 1 NG/L
ACANTHOCYTES BLD QL SMEAR: PRESENT
ALBUMIN SERPL BCP-MCNC: 4.4 G/DL (ref 3.5–5)
ALP SERPL-CCNC: 102 U/L (ref 34–104)
ALT SERPL W P-5'-P-CCNC: 12 U/L (ref 7–52)
AMMONIA PLAS-SCNC: 15 UMOL/L (ref 18–72)
ANION GAP SERPL CALCULATED.3IONS-SCNC: 10 MMOL/L (ref 4–13)
APAP SERPL-MCNC: 26 UG/ML (ref 10–20)
AST SERPL W P-5'-P-CCNC: 18 U/L (ref 13–39)
ATRIAL RATE: 131 BPM
ATRIAL RATE: 159 BPM
BASE EX.OXY STD BLDV CALC-SCNC: 66.5 % (ref 60–80)
BASE EXCESS BLDV CALC-SCNC: -0.4 MMOL/L
BASOPHILS # BLD MANUAL: 0 THOUSAND/UL (ref 0–0.1)
BASOPHILS NFR MAR MANUAL: 0 % (ref 0–1)
BILIRUB SERPL-MCNC: 0.79 MG/DL (ref 0.2–1)
BUN SERPL-MCNC: 33 MG/DL (ref 5–25)
BURR CELLS BLD QL SMEAR: PRESENT
CALCIUM SERPL-MCNC: 9.6 MG/DL (ref 8.4–10.2)
CARDIAC TROPONIN I PNL SERPL HS: 25 NG/L
CARDIAC TROPONIN I PNL SERPL HS: 26 NG/L
CARDIAC TROPONIN I PNL SERPL HS: 26 NG/L
CHLORIDE SERPL-SCNC: 100 MMOL/L (ref 96–108)
CO2 SERPL-SCNC: 27 MMOL/L (ref 21–32)
CREAT SERPL-MCNC: 1.24 MG/DL (ref 0.6–1.3)
EOSINOPHIL # BLD MANUAL: 0 THOUSAND/UL (ref 0–0.4)
EOSINOPHIL NFR BLD MANUAL: 0 % (ref 0–6)
ERYTHROCYTE [DISTWIDTH] IN BLOOD BY AUTOMATED COUNT: 14.4 % (ref 11.6–15.1)
ETHANOL SERPL-MCNC: <10 MG/DL
GFR SERPL CREATININE-BSD FRML MDRD: 39 ML/MIN/1.73SQ M
GLUCOSE SERPL-MCNC: 130 MG/DL (ref 65–140)
GLUCOSE SERPL-MCNC: 135 MG/DL (ref 65–140)
HCO3 BLDV-SCNC: 26.4 MMOL/L (ref 24–30)
HCT VFR BLD AUTO: 39.4 % (ref 34.8–46.1)
HGB BLD-MCNC: 12.3 G/DL (ref 11.5–15.4)
LACTATE SERPL-SCNC: 1.5 MMOL/L (ref 0.5–2)
LYMPHOCYTES # BLD AUTO: 0.78 THOUSAND/UL (ref 0.6–4.47)
LYMPHOCYTES # BLD AUTO: 8 % (ref 14–44)
MCH RBC QN AUTO: 28.2 PG (ref 26.8–34.3)
MCHC RBC AUTO-ENTMCNC: 31.2 G/DL (ref 31.4–37.4)
MCV RBC AUTO: 90 FL (ref 82–98)
MONOCYTES # BLD AUTO: 0.39 THOUSAND/UL (ref 0–1.22)
MONOCYTES NFR BLD: 4 % (ref 4–12)
NEUTROPHILS # BLD MANUAL: 8.58 THOUSAND/UL (ref 1.85–7.62)
NEUTS BAND NFR BLD MANUAL: 2 % (ref 0–8)
NEUTS SEG NFR BLD AUTO: 86 % (ref 43–75)
O2 CT BLDV-SCNC: 12.5 ML/DL
OVALOCYTES BLD QL SMEAR: PRESENT
PCO2 BLDV: 52.1 MM HG (ref 42–50)
PH BLDV: 7.32 [PH] (ref 7.3–7.4)
PLATELET # BLD AUTO: 173 THOUSANDS/UL (ref 149–390)
PLATELET BLD QL SMEAR: ADEQUATE
PMV BLD AUTO: 11.1 FL (ref 8.9–12.7)
PO2 BLDV: 36.1 MM HG (ref 35–45)
POIKILOCYTOSIS BLD QL SMEAR: PRESENT
POTASSIUM SERPL-SCNC: 4.1 MMOL/L (ref 3.5–5.3)
PROT SERPL-MCNC: 6.9 G/DL (ref 6.4–8.4)
QRS AXIS: 109 DEGREES
QRS AXIS: 120 DEGREES
QRSD INTERVAL: 116 MS
QRSD INTERVAL: 126 MS
QT INTERVAL: 348 MS
QT INTERVAL: 360 MS
QTC INTERVAL: 517 MS
QTC INTERVAL: 529 MS
RBC # BLD AUTO: 4.36 MILLION/UL (ref 3.81–5.12)
RBC MORPH BLD: PRESENT
SALICYLATES SERPL-MCNC: <5 MG/DL (ref 3–20)
SODIUM SERPL-SCNC: 137 MMOL/L (ref 135–147)
T WAVE AXIS: -35 DEGREES
T WAVE AXIS: -39 DEGREES
TSH SERPL DL<=0.05 MIU/L-ACNC: 4.1 UIU/ML (ref 0.45–4.5)
VENTRICULAR RATE: 130 BPM
VENTRICULAR RATE: 133 BPM
WBC # BLD AUTO: 9.75 THOUSAND/UL (ref 4.31–10.16)

## 2024-03-22 PROCEDURE — 99448 NTRPROF PH1/NTRNET/EHR 21-30: CPT | Performed by: EMERGENCY MEDICINE

## 2024-03-22 PROCEDURE — 96361 HYDRATE IV INFUSION ADD-ON: CPT

## 2024-03-22 PROCEDURE — 96368 THER/DIAG CONCURRENT INF: CPT

## 2024-03-22 PROCEDURE — 83605 ASSAY OF LACTIC ACID: CPT

## 2024-03-22 PROCEDURE — 80143 DRUG ASSAY ACETAMINOPHEN: CPT

## 2024-03-22 PROCEDURE — 84443 ASSAY THYROID STIM HORMONE: CPT

## 2024-03-22 PROCEDURE — 80053 COMPREHEN METABOLIC PANEL: CPT

## 2024-03-22 PROCEDURE — 36415 COLL VENOUS BLD VENIPUNCTURE: CPT

## 2024-03-22 PROCEDURE — 85027 COMPLETE CBC AUTOMATED: CPT

## 2024-03-22 PROCEDURE — 85007 BL SMEAR W/DIFF WBC COUNT: CPT

## 2024-03-22 PROCEDURE — 96365 THER/PROPH/DIAG IV INF INIT: CPT

## 2024-03-22 PROCEDURE — 82140 ASSAY OF AMMONIA: CPT

## 2024-03-22 PROCEDURE — 84484 ASSAY OF TROPONIN QUANT: CPT

## 2024-03-22 PROCEDURE — 82077 ASSAY SPEC XCP UR&BREATH IA: CPT

## 2024-03-22 PROCEDURE — 71045 X-RAY EXAM CHEST 1 VIEW: CPT

## 2024-03-22 PROCEDURE — 99285 EMERGENCY DEPT VISIT HI MDM: CPT | Performed by: EMERGENCY MEDICINE

## 2024-03-22 PROCEDURE — 99223 1ST HOSP IP/OBS HIGH 75: CPT

## 2024-03-22 PROCEDURE — 82948 REAGENT STRIP/BLOOD GLUCOSE: CPT

## 2024-03-22 PROCEDURE — 80179 DRUG ASSAY SALICYLATE: CPT

## 2024-03-22 PROCEDURE — 82805 BLOOD GASES W/O2 SATURATION: CPT | Performed by: EMERGENCY MEDICINE

## 2024-03-22 PROCEDURE — 93005 ELECTROCARDIOGRAM TRACING: CPT

## 2024-03-22 PROCEDURE — 96376 TX/PRO/DX INJ SAME DRUG ADON: CPT

## 2024-03-22 PROCEDURE — 99285 EMERGENCY DEPT VISIT HI MDM: CPT

## 2024-03-22 RX ORDER — PHYSOSTIGMINE SALICYLATE 1 MG/ML
2 INJECTION INTRAVENOUS ONCE
Status: CANCELLED | OUTPATIENT
Start: 2024-03-22 | End: 2024-03-22

## 2024-03-22 RX ORDER — PANTOPRAZOLE SODIUM 20 MG/1
20 TABLET, DELAYED RELEASE ORAL DAILY
Status: DISCONTINUED | OUTPATIENT
Start: 2024-03-23 | End: 2024-03-26 | Stop reason: HOSPADM

## 2024-03-22 RX ORDER — SPIRONOLACTONE 25 MG/1
12.5 TABLET ORAL DAILY
Status: DISCONTINUED | OUTPATIENT
Start: 2024-03-23 | End: 2024-03-26 | Stop reason: HOSPADM

## 2024-03-22 RX ORDER — BUMETANIDE 1 MG/1
1 TABLET ORAL DAILY
Status: DISCONTINUED | OUTPATIENT
Start: 2024-03-23 | End: 2024-03-26 | Stop reason: HOSPADM

## 2024-03-22 RX ORDER — AMLODIPINE BESYLATE 5 MG/1
5 TABLET ORAL DAILY
Status: DISCONTINUED | OUTPATIENT
Start: 2024-03-23 | End: 2024-03-22

## 2024-03-22 RX ORDER — DILTIAZEM HYDROCHLORIDE 5 MG/ML
10 INJECTION INTRAVENOUS ONCE
Status: COMPLETED | OUTPATIENT
Start: 2024-03-22 | End: 2024-03-22

## 2024-03-22 RX ORDER — LOSARTAN POTASSIUM 25 MG/1
25 TABLET ORAL DAILY
Status: DISCONTINUED | OUTPATIENT
Start: 2024-03-23 | End: 2024-03-26 | Stop reason: HOSPADM

## 2024-03-22 RX ORDER — ATORVASTATIN CALCIUM 40 MG/1
40 TABLET, FILM COATED ORAL EVERY EVENING
Status: DISCONTINUED | OUTPATIENT
Start: 2024-03-22 | End: 2024-03-26 | Stop reason: HOSPADM

## 2024-03-22 RX ORDER — AMOXICILLIN 250 MG
1 CAPSULE ORAL DAILY
Status: DISCONTINUED | OUTPATIENT
Start: 2024-03-23 | End: 2024-03-26 | Stop reason: HOSPADM

## 2024-03-22 RX ORDER — LEVOTHYROXINE SODIUM 88 UG/1
88 TABLET ORAL DAILY
Status: DISCONTINUED | OUTPATIENT
Start: 2024-03-23 | End: 2024-03-26 | Stop reason: HOSPADM

## 2024-03-22 RX ADMIN — SODIUM CHLORIDE 500 ML: 0.9 INJECTION, SOLUTION INTRAVENOUS at 18:24

## 2024-03-22 RX ADMIN — APIXABAN 2.5 MG: 2.5 TABLET, FILM COATED ORAL at 23:08

## 2024-03-22 RX ADMIN — DILTIAZEM HYDROCHLORIDE 2.5 MG/HR: 5 INJECTION INTRAVENOUS at 20:15

## 2024-03-22 RX ADMIN — ATORVASTATIN CALCIUM 40 MG: 40 TABLET, FILM COATED ORAL at 23:08

## 2024-03-22 RX ADMIN — ACETYLCYSTEINE 3515 MG: 200 INJECTION, SOLUTION INTRAVENOUS at 20:52

## 2024-03-22 RX ADMIN — Medication 12.5 MG: at 23:08

## 2024-03-22 RX ADMIN — ACETYLCYSTEINE 10545 MG: 200 INJECTION INTRAVENOUS at 19:43

## 2024-03-22 RX ADMIN — DILTIAZEM HYDROCHLORIDE 10 MG: 5 INJECTION INTRAVENOUS at 19:57

## 2024-03-22 NOTE — ED PROVIDER NOTES
History  Chief Complaint   Patient presents with    Overdose - Intentional     Pt took handfuls of her home medication, reported intentional. Denies hi. Family found her at home drousy prior to calling 911. Zofran, hydralazine, bendaryl, tylenol PM. Reports she took it around 1000     HPI 82 y/o female with intentional OD of unknown amounts of zofran, atarax, clopidogrel, benadryl, as well as 3 tylenol cold and flu tablets. She states she took the meds at 10am and has been fatigued but denies any complaints otherwise. She states she has been depressed since her   of PD 6 years ago. Grandson found her sitting on her bedroom floor and was incontinent of stool. Son and Grandson report she is closer to baseline now then when she was initially found. They are unsure of quantity of medication. She is in afib rvr at presentation, but awake, alert and oriented without focal deficit.     Prior to Admission Medications   Prescriptions Last Dose Informant Patient Reported? Taking?   Diclofenac Sodium (VOLTAREN) 1 %  Self No No   Sig: Apply 2 g topically in the morning APPLY NO MORE THAN 3 DAYS IN A ROW THEN TAKE A BREAK FOR ONE WEEK FROM USE.   amLODIPine (NORVASC) 10 mg tablet  Self No No   Sig: Take 0.5 tablets (5 mg total) by mouth daily   apixaban (Eliquis) 2.5 mg  Self No No   Sig: Take 1 tablet (2.5 mg total) by mouth 2 (two) times a day   atorvastatin (LIPITOR) 40 mg tablet  Self No No   Sig: Take 1 tablet (40 mg total) by mouth every evening   bumetanide (BUMEX) 1 mg tablet   No No   Sig: Take 1 tablet (1 mg total) by mouth daily   gabapentin (NEURONTIN) 100 mg capsule  Self No No   Sig: Take 1 tablet at bedtime.  May increase to 2 tablets after 3 days   Patient not taking: Reported on 12/15/2023   ketoconazole (NIZORAL) 2 % cream   No No   Sig: Apply topically daily   levothyroxine 88 mcg tablet  Self No No   Sig: TAKE 1 TABLET EVERY DAY   losartan (COZAAR) 25 mg tablet  Self No No   Sig: TAKE 2 TABLETS IN  THE MORNING AND TAKE 1 TABLET IN THE EVENING   metoprolol tartrate (LOPRESSOR) 25 mg tablet   No No   Sig: Take 0.5 tablets (12.5 mg total) by mouth every 12 (twelve) hours   pantoprazole (PROTONIX) 20 mg tablet  Self No No   Sig: TAKE 1 TABLET EVERY DAY   senna-docusate sodium (SENOKOT S) 8.6-50 mg per tablet  Self No No   Sig: Take 1 tablet by mouth daily   Patient not taking: Reported on 2023   spironolactone (ALDACTONE) 25 mg tablet  Self No No   Sig: Take 0.5 tablets (12.5 mg total) by mouth daily      Facility-Administered Medications: None       Past Medical History:   Diagnosis Date    Anxiety     Arthritis     joints    Bunion     Cataract     Disease of thyroid gland     Eczema     GERD (gastroesophageal reflux disease)     Gout     Heart failure (HCC)     Hepatitis     Hiatal hernia     Hypertension     Onychomycosis     last assessed: 16    Orthopnea     resolved: 16    Pseudogout of left wrist     Sleep apnea     wears CPAP    Stroke (HCC)        Past Surgical History:   Procedure Laterality Date    ABDOMINAL SURGERY          BRAIN HEMATOMA EVACUATION Right 2020    Procedure: Right decompressive craniectomy and evacuation of intraparenchymal hematoma;  Surgeon: Apolinar Herrera MD;  Location:  MAIN OR;  Service: Neurosurgery    BREAST SURGERY Right     cyst removal    CARPAL TUNNEL RELEASE Left     CARPAL TUNNEL RELEASE Right     CATARACT EXTRACTION       SECTION  1960    x1    COLONOSCOPY N/A 2018    Procedure: COLONOSCOPY;  Surgeon: Alejandro Carlson MD;  Location: [facility];  Service: Gastroenterology    DILATION AND CURETTAGE OF UTERUS  1967    EYE SURGERY Left 2006    cataracts    HERNIA REPAIR      umbilical hernia    INCISIONAL HERNIA REPAIR      incarcerated    KNEE ARTHROSCOPY Right     AL RPLCMT BONE FLAP/PROSTHETIC PLATE SKULL Right 2020    Procedure: right frontotemporal replacement cranioplasty;  Surgeon: Apolinar Herrera MD;  Location:  [facility];  Service: Neurosurgery    PA XCAPSL CTRC RMVL INSJ IO LENS PROSTH W/O ECP Right 3/27/2017    Procedure: EXTRACTION EXTRACAPSULAR CATARACT PHACO INTRAOCULAR LENS (IOL);  Surgeon: Trip Gilbert MD;  Location: [facility] MAIN OR;  Service: Ophthalmology       Family History   Problem Relation Age of Onset    Diabetes Mother     Coronary artery disease Mother     Arthritis Mother     Hypertension Mother     Hypertension Father     Diabetes Brother     Diabetes Son     Arthritis Family      I have reviewed and agree with the history as documented.    E-Cigarette/Vaping    E-Cigarette Use Never User      E-Cigarette/Vaping Substances    Nicotine No     THC No     CBD No     Flavoring No     Other No     Unknown No      Social History     Tobacco Use    Smoking status: Never    Smokeless tobacco: Never   Vaping Use    Vaping status: Never Used   Substance Use Topics    Alcohol use: Yes     Comment: Only on special occasions    Drug use: Never        Review of Systems   Constitutional:  Positive for fatigue.   Psychiatric/Behavioral:  Positive for dysphoric mood and suicidal ideas.    All other systems reviewed and are negative.      Physical Exam  ED Triage Vitals   Temperature Pulse Respirations Blood Pressure SpO2   03/22/24 1829 03/22/24 1807 03/22/24 1807 03/22/24 1807 03/22/24 1807   97.8 °F (36.6 °C) (!) 134 16 131/72 99 %      Temp Source Heart Rate Source Patient Position - Orthostatic VS BP Location FiO2 (%)   03/22/24 1829 03/22/24 1807 03/22/24 1807 03/22/24 1807 --   Oral Monitor Lying Left arm       Pain Score       03/22/24 1807       No Pain             Orthostatic Vital Signs  Vitals:    03/23/24 1156 03/23/24 1512 03/23/24 2220 03/23/24 2223   BP: 145/64 119/64 130/79 130/79   Pulse: 90 83 (!) 106 (!) 106   Patient Position - Orthostatic VS:           Physical Exam  Vitals and nursing note reviewed.   Constitutional:       General: She is in acute distress.      Appearance: She is ill-appearing.  She is not toxic-appearing or diaphoretic.   HENT:      Head: Normocephalic and atraumatic.      Nose: Nose normal.      Mouth/Throat:      Mouth: Mucous membranes are dry.      Pharynx: Oropharynx is clear.   Eyes:      General: Scleral icterus (faint) present.         Right eye: No discharge.         Left eye: No discharge.      Extraocular Movements: Extraocular movements intact.      Conjunctiva/sclera: Conjunctivae normal.      Pupils: Pupils are equal, round, and reactive to light.   Cardiovascular:      Rate and Rhythm: Tachycardia present. Rhythm irregular.      Pulses: Normal pulses.      Heart sounds: Normal heart sounds.   Pulmonary:      Effort: Pulmonary effort is normal. No respiratory distress.      Breath sounds: Normal breath sounds. No stridor. No wheezing, rhonchi or rales.   Chest:      Chest wall: No tenderness.   Abdominal:      General: Bowel sounds are normal. There is no distension.      Palpations: Abdomen is soft. There is no mass.      Tenderness: There is no abdominal tenderness. There is no right CVA tenderness, left CVA tenderness, guarding or rebound.      Hernia: No hernia is present.   Musculoskeletal:         General: No swelling, tenderness, deformity or signs of injury. Normal range of motion.      Cervical back: Normal range of motion and neck supple. No rigidity or tenderness.      Right lower leg: No edema.      Left lower leg: No edema.   Skin:     General: Skin is warm and dry.      Coloration: Skin is not jaundiced or pale.      Findings: No bruising, erythema, lesion or rash.   Neurological:      General: No focal deficit present.      Mental Status: She is alert and oriented to person, place, and time.      Cranial Nerves: No cranial nerve deficit.      Sensory: No sensory deficit.      Motor: No weakness.      Coordination: Coordination normal.      Deep Tendon Reflexes: Reflexes normal.   Psychiatric:         Attention and Perception: Attention and perception normal.  She is attentive. She does not perceive auditory or visual hallucinations.         Mood and Affect: Mood is depressed.         Speech: Speech normal.         Behavior: Behavior normal. Behavior is cooperative.         Thought Content: Thought content is not paranoid or delusional. Thought content includes suicidal ideation. Thought content does not include homicidal ideation. Thought content includes suicidal plan. Thought content does not include homicidal plan.         Cognition and Memory: Cognition and memory normal.         Judgment: Judgment normal.         ED Medications  Medications   apixaban (ELIQUIS) tablet 2.5 mg (2.5 mg Oral Given 3/23/24 1713)   atorvastatin (LIPITOR) tablet 40 mg (40 mg Oral Given 3/23/24 1713)   bumetanide (BUMEX) tablet 1 mg (1 mg Oral Given 3/23/24 0836)   levothyroxine tablet 88 mcg (88 mcg Oral Given 3/23/24 0836)   metoprolol tartrate (LOPRESSOR) partial tablet 12.5 mg (12.5 mg Oral Given 3/23/24 2220)   pantoprazole (PROTONIX) EC tablet 20 mg (20 mg Oral Given 3/23/24 0836)   spironolactone (ALDACTONE) tablet 12.5 mg (12.5 mg Oral Given 3/23/24 0836)   senna-docusate sodium (SENOKOT S) 8.6-50 mg per tablet 1 tablet (1 tablet Oral Given 3/23/24 0836)   losartan (COZAAR) tablet 25 mg (25 mg Oral Given 3/23/24 0836)   melatonin tablet 3 mg (3 mg Oral Given 3/23/24 2220)   sodium chloride 0.9 % infusion (0 mL/hr Intravenous Stopped 3/23/24 1644)   Diclofenac Sodium (VOLTAREN) 1 % topical gel 2 g (2 g Topical Not Given 3/23/24 2221)   sodium chloride 0.9 % bolus 500 mL (0 mL Intravenous Stopped 3/22/24 1910)   acetylcysteine (ACETADOTE) 10,545 mg in dextrose 5 % 200 mL IVPB (0 mg Intravenous Stopped 3/22/24 2051)   acetylcysteine (ACETADOTE) 3,515 mg in dextrose 5 % 500 mL IVPB (3,515 mg Intravenous New Bag 3/22/24 2052)   diltiazem (CARDIZEM) injection 10 mg (10 mg Intravenous Given 3/22/24 1957)   potassium chloride (Klor-Con M20) CR tablet 40 mEq (40 mEq Oral Given 3/23/24 0836)        Diagnostic Studies  Results Reviewed       Procedure Component Value Units Date/Time    HS Troponin I 4hr [[id number]]  (Normal) Collected: 03/22/24 2213    Lab Status: Final result Specimen: Blood from Hand, Right Updated: 03/22/24 2246     hs TnI 4hr 26 ng/L      Delta 4hr hsTnI 1 ng/L     HS Troponin I 2hr [[id number]]  (Normal) Collected: 03/22/24 1955    Lab Status: Final result Specimen: Blood from Arm, Right Updated: 03/22/24 2020     hs TnI 2hr 26 ng/L      Delta 2hr hsTnI 1 ng/L     Blood gas, venous [[id number]]  (Abnormal) Collected: 03/22/24 1955    Lab Status: Final result Specimen: Blood from Arm, Right Updated: 03/22/24 2002     pH, Jack 7.322     pCO2, Jack 52.1 mm Hg      pO2, Jack 36.1 mm Hg      HCO3, Jack 26.4 mmol/L      Base Excess, Jack -0.4 mmol/L      O2 Content, Jack 12.5 ml/dL      O2 HGB, VENOUS 66.5 %     RBC Morphology Reflex Test [[id number]] Collected: 03/22/24 1826    Lab Status: Final result Specimen: Blood from Arm, Right Updated: 03/22/24 2001    TSH, 3rd generation with Free T4 reflex [[id number]]  (Normal) Collected: 03/22/24 1826    Lab Status: Final result Specimen: Blood from Arm, Right Updated: 03/22/24 1912     TSH 3RD GENERATON 4.099 uIU/mL     Acetaminophen level-If concentration is detectable, please discuss with medical  on call. [[id number]]  (Abnormal) Collected: 03/22/24 1826    Lab Status: Final result Specimen: Blood from Arm, Right Updated: 03/22/24 1908     Acetaminophen Level 26 ug/mL     Salicylate level [[id number]]  (Normal) Collected: 03/22/24 1826    Lab Status: Final result Specimen: Blood from Arm, Right Updated: 03/22/24 1906     Salicylate Lvl <5 mg/dL     CBC and differential [[id number]]  (Abnormal) Collected: 03/22/24 1826    Lab Status: Final result Specimen: Blood from Arm, Right Updated: 03/22/24 1905     WBC 9.75 Thousand/uL      RBC 4.36 Million/uL      Hemoglobin 12.3 g/dL      Hematocrit 39.4 %      MCV 90 fL      MCH 28.2 pg      MCHC  31.2 g/dL      RDW 14.4 %      MPV 11.1 fL      Platelets 173 Thousands/uL     Narrative:      This is an appended report.  These results have been appended to a previously verified report.    Manual Differential(PHLEBS Do Not Order) [[id number]]  (Abnormal) Collected: 03/22/24 1826    Lab Status: Final result Specimen: Blood from Arm, Right Updated: 03/22/24 1905     Segmented % 86 %      Bands % 2 %      Lymphocytes % 8 %      Monocytes % 4 %      Eosinophils % 0 %      Basophils % 0 %      Absolute Neutrophils 8.58 Thousand/uL      Absolute Lymphocytes 0.78 Thousand/uL      Absolute Monocytes 0.39 Thousand/uL      Absolute Eosinophils 0.00 Thousand/uL      Absolute Basophils 0.00 Thousand/uL      Total Counted --     RBC Morphology Present     Platelet Estimate Adequate     Acanthocytes Present     Mirza Cells Present     Ovalocytes Present     Poikilocytes Present    Comprehensive metabolic panel [[id number]]  (Abnormal) Collected: 03/22/24 1826    Lab Status: Final result Specimen: Blood from Arm, Right Updated: 03/22/24 1905     Sodium 137 mmol/L      Potassium 4.1 mmol/L      Chloride 100 mmol/L      CO2 27 mmol/L      ANION GAP 10 mmol/L      BUN 33 mg/dL      Creatinine 1.24 mg/dL      Glucose 135 mg/dL      Calcium 9.6 mg/dL      AST 18 U/L      ALT 12 U/L      Alkaline Phosphatase 102 U/L      Total Protein 6.9 g/dL      Albumin 4.4 g/dL      Total Bilirubin 0.79 mg/dL      eGFR 39 ml/min/1.73sq m     Narrative:      National Kid[employer] Foundation guidelines for Chronic Kidney Disease (CKD):     Stage 1 with normal or high GFR (GFR > 90 mL/min/1.73 square meters)    Stage 2 Mild CKD (GFR = 60-89 mL/min/1.73 square meters)    Stage 3A Moderate CKD (GFR = 45-59 mL/min/1.73 square meters)    Stage 3B Moderate CKD (GFR = 30-44 mL/min/1.73 square meters)    Stage 4 Severe CKD (GFR = 15-29 mL/min/1.73 square meters)    Stage 5 End Stage CKD (GFR <15 mL/min/1.73 square meters)  Note: GFR calculation is accurate  only with a steady state creatinine    HS Troponin 0hr (reflex protocol) [[id number]]  (Normal) Collected: 03/22/24 1826    Lab Status: Final result Specimen: Blood from Arm, Right Updated: 03/22/24 1901     hs TnI 0hr 25 ng/L     Ammonia [[id number]]  (Abnormal) Collected: 03/22/24 1826    Lab Status: Final result Specimen: Blood from Arm, Right Updated: 03/22/24 1853     Ammonia 15 umol/L     Lactic acid, plasma (w/reflex if result > 2.0) [[id number]]  (Normal) Collected: 03/22/24 1826    Lab Status: Final result Specimen: Blood from Arm, Right Updated: 03/22/24 1853     LACTIC ACID 1.5 mmol/L     Narrative:      Result may be elevated if tourniquet was used during collection.    Ethanol [[id number]]  (Normal) Collected: 03/22/24 1826    Lab Status: Final result Specimen: Blood from Arm, Right Updated: 03/22/24 1853     Ethanol Lvl <10 mg/dL     Fingerstick Glucose (POCT) [[id number]]  (Normal) Collected: 03/22/24 1808    Lab Status: Final result Specimen: Blood Updated: 03/22/24 1809     POC Glucose 130 mg/dl                    XR chest 1 view portable   Final Result by Anne Hercules MD (03/23 0726)      Mild pulmonary venous congestion.            Workstation performed: [id number]               Procedures  ECG 12 Lead Documentation Only    Date/Time: 3/22/2024 6:07 PM    Performed by: Jim Beaulieu DO  Authorized by: Jim Beaulieu DO    Indications / Diagnosis:  Intentional OD  ECG reviewed by me, the ED Provider: yes    Patient location:  ED  Previous ECG:     Previous ECG:  Compared to current    Comparison ECG info:  10/4/23    Similarity:  Changes noted    Comparison to cardiac monitor: Yes    Interpretation:     Interpretation: abnormal    Rate:     ECG rate:  133    ECG rate assessment: tachycardic    Rhythm:     Rhythm: atrial fibrillation    Ectopy:     Ectopy: none    QRS:     QRS axis:  Normal    QRS intervals:  Normal  Conduction:     Conduction: normal    ST segments:      ST segments:  Non-specific  T waves:     T waves: non-specific    Comments:      Afib RVR, RBBB. RVR is new from previous study on 10/4/23.        ED Course       1:1 ordered. Pt is awake, alert, orientedx3. No focal deficit. Afib RVR. Intentional OD, admittedly.  Discussed with tox - Dr. lAex she is fine for admission to medicine with supportive care and NAC for tylenol of 26 and uncertain ingestion. She is also in afib rvr, starting cardizem bolus drip at the moment. Son reports she is close to baseline but fatigued. She is alert and oriented x3, currently sitting comfortably.  Pt admitted to [facility] for further management, observation as discussed with Dr. Alex (tox). Pt admitted under Dr. Abreu's care.       HEART Risk Score      Flowsheet Row Most Recent Value   Heart Score Risk Calculator    History 0 Filed at: 2024   ECG 1 Filed at: 2024   Age 2 Filed at: 2024 2100   Risk Factors 2 Filed at: 2024 2100   Troponin 1 Filed at: 2024   HEART Score 6 Filed at: 2024                                  Medical Decision Making  DDx including but not limited to: intentional overdose, arrhythmia, anticholinergic toxidrome, metabolic abnormality, depression, suicide attempt, intracranial process, cardiac etiology, substance abuse, aspiration pneumonitis, other toxidrome.    82 y/o female with intentional OD of unknown amounts of zofran, atarax, clopidogrel, benadryl, as well as 3 tylenol cold and flu tablets. She states she took the meds at 10am and has been fatigued but denies any complaints otherwise. She states she has been depressed since her   of PD 6 years ago. Grandson found her sitting on her bedroom floor and was incontinent of stool. Son and Grandson report she is closer to baseline now then when she was initially found. They are unsure of quantity of medication. She is in afib rvr at presentation, but awake, alert and oriented without focal  deficit.     1:1 ordered. Pt is awake, alert, orientedx3. No focal deficit. Afib RVR. Intentional OD, admittedly.  Discussed with tox - Dr. Alex she is fine for admission to medicine with supportive care and NAC for tylenol of 26 and uncertain ingestion. She is also in afib rvr, starting cardizem bolus drip at the moment. Son reports she is close to baseline but fatigued. She is alert and oriented x3, currently sitting comfortably.  Pt admitted to [facility] for further management, observation as discussed with Dr. Alex (tox). Pt admitted under Dr. Abreu's care.     Amount and/or Complexity of Data Reviewed  Labs: ordered.    Risk  Prescription drug management.  Decision regarding hospitalization.          Disposition  Final diagnoses:   OD (overdose of drug), intentional self-harm, initial encounter ([facility])   Suicide attempt ([facility])   Depression   A-fib ([facility]) - [facility] RVR     Time reflects when diagnosis was documented in both MDM as applicable and the Disposition within this note       Time User Action Codes Description Comment    3/22/2024  7:59 PM Jim Beaulieu Add [T50.902A] OD (overdose of drug), intentional self-harm, initial encounter ([facility])     3/22/2024  8:30 PM Jim Beaulieu Add [T14.91XA] Suicide attempt ([facility])     3/22/2024  8:30 PM Jim Beaulieu Add [F32.A] Depression     3/22/2024  8:30 PM Jim Beaulieu Add [I48.91] A-fib ([facility])     3/22/2024  8:30 PM Jim Beaulieu Modify [I48.91] A-fib ([facility]) [facility] RVR          ED Disposition       ED Disposition   Admit    Condition   Stable    Date/Time   Fri Mar 22, 2024 2032    Comment   Case was discussed with [facility] and the patient's admission status was agreed to be Admission Status: inpatient status to the service of Dr. Abreu .               Follow-up Information    None         Current Discharge Medication List        CONTINUE these medications which have NOT CHANGED    Details   amLODIPine (NORVASC) 10 mg tablet Take 0.5 tablets (5 mg  total) by mouth daily  Qty: 90 tablet, Refills: 0    Associated Diagnoses: Benign essential hypertension      apixaban (Eliquis) 2.5 mg Take 1 tablet (2.5 mg total) by mouth 2 (two) times a day  Qty: 180 tablet, Refills: 3    Associated Diagnoses: Moderate to severe mitral regurgitation; Chronic diastolic (congestive) heart failure (HCC); Atrial fibrillation by electrocardiogram ([facility])      atorvastatin (LIPITOR) 40 mg tablet Take 1 tablet (40 mg total) by mouth every evening  Qty: 90 tablet, Refills: 1    Associated Diagnoses: Hyperlipidemia, unspecified hyperlipidemia type      bumetanide (BUMEX) 1 mg tablet Take 1 tablet (1 mg total) by mouth daily  Qty: 90 tablet, Refills: 3    Comments: PT CANNOT CUT 2 MG TAB IN HALF. TOO SMALL. SO SENDING 1 MG TAB. THANKS  Associated Diagnoses: Acute on chronic diastolic congestive heart failure (HCC)      Diclofenac Sodium (VOLTAREN) 1 % Apply 2 g topically in the morning APPLY NO MORE THAN 3 DAYS IN A ROW THEN TAKE A BREAK FOR ONE WEEK FROM USE.  Qty: 100 g, Refills: 1    Associated Diagnoses: Stage 3 chronic kidney disease, unspecified whether stage 3a or 3b CKD ([facility])      gabapentin (NEURONTIN) 100 mg capsule Take 1 tablet at bedtime.  May increase to 2 tablets after 3 days  Qty: 90 capsule, Refills: 1    Associated Diagnoses: Intervertebral disc disorder with radiculopathy of lumbar region      ketoconazole (NIZORAL) 2 % cream Apply topically daily  Qty: 60 g, Refills: 1    Associated Diagnoses: Dermatophytosis; Onychomycosis      levothyroxine 88 mcg tablet TAKE 1 TABLET EVERY DAY  Qty: 90 tablet, Refills: 1    Associated Diagnoses: Hypothyroidism, unspecified type      losartan (COZAAR) 25 mg tablet TAKE 2 TABLETS IN THE MORNING AND TAKE 1 TABLET IN THE EVENING  Qty: 270 tablet, Refills: 10    Associated Diagnoses: Benign essential HTN      metoprolol tartrate (LOPRESSOR) 25 mg tablet Take 0.5 tablets (12.5 mg total) by mouth every 12 (twelve) hours  Qty: 45 tablet,  Refills: 1    Associated Diagnoses: Paroxysmal atrial fibrillation (HCC); Moderate to severe mitral regurgitation; Current use of long term anticoagulation; PAC (premature atrial contraction)      pantoprazole (PROTONIX) 20 mg tablet TAKE 1 TABLET EVERY DAY  Qty: 90 tablet, Refills: 1    Associated Diagnoses: PAC (premature atrial contraction); Paroxysmal atrial fibrillation (HCC)      senna-docusate sodium (SENOKOT S) 8.6-50 mg per tablet Take 1 tablet by mouth daily  Qty: 20 tablet, Refills: 1    Associated Diagnoses: Stage 3 chronic kidney disease, unspecified whether stage 3a or 3b CKD (HCC); Chronic neck pain      spironolactone (ALDACTONE) 25 mg tablet Take 0.5 tablets (12.5 mg total) by mouth daily  Qty: 45 tablet, Refills: 3    Associated Diagnoses: Benign essential HTN           No discharge procedures on file.    PDMP Review         Value Time User    PDMP Reviewed  Yes 6/1/2023 11:09 AM HIEU Tolentino             ED Provider  Attending physically available and evaluated Rosalind S Frandy. I managed the patient along with the ED Attending.    Electronically Signed by           Jim Beaulieu DO  03/24/24 8045

## 2024-03-23 LAB
ALBUMIN SERPL BCP-MCNC: 3.8 G/DL (ref 3.5–5)
ALP SERPL-CCNC: 81 U/L (ref 34–104)
ALT SERPL W P-5'-P-CCNC: 11 U/L (ref 7–52)
ANION GAP SERPL CALCULATED.3IONS-SCNC: 10 MMOL/L (ref 4–13)
AST SERPL W P-5'-P-CCNC: 13 U/L (ref 13–39)
BILIRUB SERPL-MCNC: 0.57 MG/DL (ref 0.2–1)
BUN SERPL-MCNC: 27 MG/DL (ref 5–25)
CALCIUM SERPL-MCNC: 8.7 MG/DL (ref 8.4–10.2)
CHLORIDE SERPL-SCNC: 101 MMOL/L (ref 96–108)
CO2 SERPL-SCNC: 25 MMOL/L (ref 21–32)
CREAT SERPL-MCNC: 1.05 MG/DL (ref 0.6–1.3)
ERYTHROCYTE [DISTWIDTH] IN BLOOD BY AUTOMATED COUNT: 14.4 % (ref 11.6–15.1)
GFR SERPL CREATININE-BSD FRML MDRD: 48 ML/MIN/1.73SQ M
GLUCOSE SERPL-MCNC: 143 MG/DL (ref 65–140)
HCT VFR BLD AUTO: 36.7 % (ref 34.8–46.1)
HGB BLD-MCNC: 11.6 G/DL (ref 11.5–15.4)
MCH RBC QN AUTO: 28.4 PG (ref 26.8–34.3)
MCHC RBC AUTO-ENTMCNC: 31.6 G/DL (ref 31.4–37.4)
MCV RBC AUTO: 90 FL (ref 82–98)
PLATELET # BLD AUTO: 169 THOUSANDS/UL (ref 149–390)
PMV BLD AUTO: 11.2 FL (ref 8.9–12.7)
POTASSIUM SERPL-SCNC: 3.3 MMOL/L (ref 3.5–5.3)
PROT SERPL-MCNC: 6.3 G/DL (ref 6.4–8.4)
RBC # BLD AUTO: 4.08 MILLION/UL (ref 3.81–5.12)
SODIUM SERPL-SCNC: 136 MMOL/L (ref 135–147)
WBC # BLD AUTO: 10.41 THOUSAND/UL (ref 4.31–10.16)

## 2024-03-23 PROCEDURE — 99232 SBSQ HOSP IP/OBS MODERATE 35: CPT | Performed by: INTERNAL MEDICINE

## 2024-03-23 PROCEDURE — 99223 1ST HOSP IP/OBS HIGH 75: CPT | Performed by: INTERNAL MEDICINE

## 2024-03-23 PROCEDURE — 85027 COMPLETE CBC AUTOMATED: CPT

## 2024-03-23 PROCEDURE — 80053 COMPREHEN METABOLIC PANEL: CPT

## 2024-03-23 RX ORDER — LANOLIN ALCOHOL/MO/W.PET/CERES
3 CREAM (GRAM) TOPICAL
Status: DISCONTINUED | OUTPATIENT
Start: 2024-03-23 | End: 2024-03-26 | Stop reason: HOSPADM

## 2024-03-23 RX ORDER — LANOLIN ALCOHOL/MO/W.PET/CERES
3 CREAM (GRAM) TOPICAL
Status: DISCONTINUED | OUTPATIENT
Start: 2024-03-23 | End: 2024-03-23

## 2024-03-23 RX ORDER — SODIUM CHLORIDE 9 MG/ML
75 INJECTION, SOLUTION INTRAVENOUS CONTINUOUS
Status: DISPENSED | OUTPATIENT
Start: 2024-03-23 | End: 2024-03-23

## 2024-03-23 RX ORDER — POTASSIUM CHLORIDE 20 MEQ/1
40 TABLET, EXTENDED RELEASE ORAL ONCE
Status: COMPLETED | OUTPATIENT
Start: 2024-03-23 | End: 2024-03-23

## 2024-03-23 RX ADMIN — BUMETANIDE 1 MG: 1 TABLET ORAL at 08:36

## 2024-03-23 RX ADMIN — Medication 3 MG: at 22:20

## 2024-03-23 RX ADMIN — APIXABAN 2.5 MG: 2.5 TABLET, FILM COATED ORAL at 17:13

## 2024-03-23 RX ADMIN — APIXABAN 2.5 MG: 2.5 TABLET, FILM COATED ORAL at 08:36

## 2024-03-23 RX ADMIN — SODIUM CHLORIDE 75 ML/HR: 0.9 INJECTION, SOLUTION INTRAVENOUS at 03:31

## 2024-03-23 RX ADMIN — DICLOFENAC SODIUM TOPICAL GEL, 1% 2 G: 10 GEL TOPICAL at 12:35

## 2024-03-23 RX ADMIN — ATORVASTATIN CALCIUM 40 MG: 40 TABLET, FILM COATED ORAL at 17:13

## 2024-03-23 RX ADMIN — SPIRONOLACTONE 12.5 MG: 25 TABLET ORAL at 08:36

## 2024-03-23 RX ADMIN — Medication 3 MG: at 02:24

## 2024-03-23 RX ADMIN — ACETYLCYSTEINE 7030 MG: 200 INJECTION, SOLUTION INTRAVENOUS at 02:26

## 2024-03-23 RX ADMIN — Medication 12.5 MG: at 22:20

## 2024-03-23 RX ADMIN — POTASSIUM CHLORIDE 40 MEQ: 1500 TABLET, EXTENDED RELEASE ORAL at 08:36

## 2024-03-23 RX ADMIN — LOSARTAN POTASSIUM 25 MG: 25 TABLET, FILM COATED ORAL at 08:36

## 2024-03-23 RX ADMIN — SENNOSIDES, DOCUSATE SODIUM 1 TABLET: 8.6; 5 TABLET ORAL at 08:36

## 2024-03-23 RX ADMIN — Medication 12.5 MG: at 11:56

## 2024-03-23 RX ADMIN — PANTOPRAZOLE SODIUM 20 MG: 20 TABLET, DELAYED RELEASE ORAL at 08:36

## 2024-03-23 RX ADMIN — LEVOTHYROXINE SODIUM 88 MCG: 88 TABLET ORAL at 08:36

## 2024-03-23 RX ADMIN — DICLOFENAC SODIUM TOPICAL GEL, 1% 2 G: 10 GEL TOPICAL at 17:13

## 2024-03-23 NOTE — ASSESSMENT & PLAN NOTE
In ED patient noted to be in A-fib with RVR was started on Cardizem drip  Patient maintained on metoprolol and Eliquis twice daily  Trend troponins  Continue to monitor on telemetry  If she remains elevated consider cardiology consultation

## 2024-03-23 NOTE — PROGRESS NOTES
[facility]   PROGRESS NOTE- Rosalind Wise, 1940, 83 y.o. female MRN: [id number]   Unit/Bed#: S /S -01 Encounter: [id number]   Primary Care Physician: Suzanna Lopez MD   Date and Time Admitted to Hospital: 3/22/2024  6:03 PM     Assessment/Plan:   * Intentional overdose (HCC)  Assessment & Plan  Patient ingested unknown amounts of Zofran, Atarax, Plavix, Benadryl, Tylenol cold and flu tablets.  Per ED she reports she ingested medications around 10 AM.  In the ED Case was discussed with Dr. Alex with toxicology and recommending supportive care and NAC therapy.  Patient received acetylcysteine in the ED and was given 500 mL bolus of normal saline.  Patient with signs of anticholinergic effects  Patient currently on one-to-one for suicidal ideations.  She denies any history of SI.  She reports feeling depressed since the passing of her .  Denies any auditory or visual hallucinations.  Denies any homicidal ideations.  Plan  Appreciate psychiatric recommendations  Continue to monitor on IV fluids  Neurochecks every 4 hours  Urinary retention protocol in place    Atrial fibrillation with RVR (HCC)  Assessment & Plan  In ED patient noted to be in A-fib with RVR was started on Cardizem drip, now off and rate controlled.  Patient maintained on metoprolol and Eliquis twice daily  Troponins negative x 3.  Continue to monitor on telemetry  Cardiology recommending no changes at this time, signed off.      CKD (chronic kidney disease), stage III (HCC)  Assessment & Plan  Lab Results   Component Value Date    EGFR 48 03/23/2024    EGFR 39 03/22/2024    EGFR 32 12/07/2023    CREATININE 1.05 03/23/2024    CREATININE 1.24 03/22/2024    CREATININE 1.46 (H) 12/07/2023   At baseline continue to trend cmp    Chronic diastolic (congestive) heart failure (HCC)  Assessment & Plan  Wt Readings from Last 3 Encounters:   03/23/24 71 kg (156 lb 8.4 oz)   02/27/24 70.3 kg (155 lb)    23 70.3 kg (155 lb)   Appears euvolemic at this time  Echo from 2023 showing LVEF of 60% with normal systolic function and diastolic dysfunction.  AV with reduced mobility, moderate regurgitation, and mild stenosis.  Trace TV regurgitation moderate MV regurgitation noted  Continue metoprolol and spironolactone  Monitor intake and output  Daily weights I/O's    Benign essential hypertension  Assessment & Plan  Continue PTA losartan, Aldactone, and metoprolol  Blood pressures appear stable at this time  Avoid hypotension         VTE Pharmacologic Prophylaxis: VTE Score: 3 Moderate Risk (Score 3-4) - Pharmacological DVT Prophylaxis Ordered: apixaban (Eliquis).    Patient Centered Rounds:  Performed bedside rounds with nursing staff.   Discussions with Specialists or Other Care Team Provider: Senior resident and attending  Education and Discussions with Family / Patient: Updated  (son) at bedside.    Current Length of Stay: 1 day(s)  Current Patient Status: Inpatient   Discharge Plan: Anticipate discharge in 24-48 hrs to to be determined pending psych evaluation    Code Status: Level 3 - DNAR and DNI    Subjective:   No acute events overnight.  Patient is resting comfortably in bed, tearful thinking about her .  She says she regrets taking the medications and that was a mistake, and no longer having suicidal ideation currently.  She is still very sad however.  She is not complaining of any other symptoms at this time.  No chest pain, palpitations, shortness of breath, fevers or chills.    Objective:     Vitals:   Temp (24hrs), Av.7 °F (36.5 °C), Min:97.6 °F (36.4 °C), Max:97.8 °F (36.6 °C)    Temp:  [97.6 °F (36.4 °C)-97.8 °F (36.6 °C)] 97.8 °F (36.6 °C)  HR:  [] 90  Resp:  [16] 16  BP: ()/(55-78) 145/64  SpO2:  [89 %-99 %] 98 %  Body mass index is 29.58 kg/m².     Input and Output Summary (last 24 hours):     Intake/Output Summary (Last 24 hours) at 3/23/2024  1322  Last data filed at 3/23/2024 0222  Gross per 24 hour   Intake 700 ml   Output 450 ml   Net 250 ml       Physical Exam  Constitutional:       General: She is not in acute distress.     Appearance: Normal appearance. She is not ill-appearing.   Eyes:      General: No scleral icterus.     Extraocular Movements: Extraocular movements intact.      Pupils: Pupils are equal, round, and reactive to light.   Cardiovascular:      Rate and Rhythm: Tachycardia present. Rhythm irregular.      Heart sounds: No murmur heard.  Pulmonary:      Effort: Pulmonary effort is normal. No respiratory distress.      Breath sounds: No wheezing or rales.   Abdominal:      General: There is no distension.      Tenderness: There is no abdominal tenderness. There is no guarding.   Musculoskeletal:      Right lower leg: No edema.      Left lower leg: No edema.   Skin:     General: Skin is warm and dry.      Coloration: Skin is pale. Skin is not jaundiced.   Neurological:      Mental Status: She is alert and oriented to person, place, and time.      GCS: GCS eye subscore is 4. GCS verbal subscore is 5. GCS motor subscore is 6.      Cranial Nerves: No dysarthria or facial asymmetry.      Sensory: Sensation is intact.      Motor: No weakness, tremor or seizure activity.   Psychiatric:         Attention and Perception: Attention normal.         Mood and Affect: Mood is depressed.         Speech: Speech normal.         Behavior: Behavior is cooperative.         Thought Content: Thought content is not paranoid. Thought content does not include homicidal ideation. Thought content does not include homicidal plan.          Additional Data:     Labs:  Results from last 7 days   Lab Units 03/23/24  0501 03/22/24  1826   WBC Thousand/uL 10.41* 9.75   HEMOGLOBIN g/dL 11.6 12.3   HEMATOCRIT % 36.7 39.4   PLATELETS Thousands/uL 169 173   BANDS PCT %  --  2   LYMPHO PCT %  --  8*   MONO PCT %  --  4   EOS PCT %  --  0     Results from last 7 days   Lab  Units 03/23/24  0501   SODIUM mmol/L 136   POTASSIUM mmol/L 3.3*   CHLORIDE mmol/L 101   CO2 mmol/L 25   BUN mg/dL 27*   CREATININE mg/dL 1.05   ANION GAP mmol/L 10   CALCIUM mg/dL 8.7   ALBUMIN g/dL 3.8   TOTAL BILIRUBIN mg/dL 0.57   ALK PHOS U/L 81   ALT U/L 11   AST U/L 13   GLUCOSE RANDOM mg/dL 143*         Results from last 7 days   Lab Units 03/22/24  1808   POC GLUCOSE mg/dl 130         Results from last 7 days   Lab Units 03/22/24  1826   LACTIC ACID mmol/L 1.5       Lines/Drains:  Invasive Devices       Peripheral Intravenous Line  Duration             Peripheral IV 03/22/24 Left Antecubital <1 day                      Telemetry:  Telemetry Orders (From admission, onward)               24 Hour Telemetry Monitoring  Continuous x 24 Hours (Telem)        Question:  Reason for 24 Hour Telemetry  Answer:  Arrhythmias requiring acute medical intervention / PPM or ICD malfunction                     Telemetry Reviewed: Atrial fibrillation. HR averaging 91  Indication for Continued Telemetry Use: Drug overdose known to cause cardiac arrhthymias           Imaging: Reviewed pertinent radiology reports from this admission.   Recent Cultures (last 7 days):         Last 24 Hours Medication List:   Current Facility-Administered Medications   Medication Dose Route Frequency Provider Last Rate    apixaban  2.5 mg Oral BID HIEU Skelton      atorvastatin  40 mg Oral QPM Dillon Perdomo, HIEU      bumetanide  1 mg Oral Daily Dillon Perdomo, CRNP      Diclofenac Sodium  2 g Topical 4x Daily Arnaud Hawkins DO      levothyroxine  88 mcg Oral Daily Dillon Perdomo, KARMENNP      losartan  25 mg Oral Daily Dillon Perdomo, KARMENNP      melatonin  3 mg Oral HS Camille Hutton MD      metoprolol tartrate  12.5 mg Oral Q12H Dillon Perdomo, HIEU      pantoprazole  20 mg Oral Daily Dillon Perdomo, KARMENNP      senna-docusate sodium  1 tablet Oral  Daily HIEU Skelton      sodium chloride  75 mL/hr Intravenous Continuous Dillon Perdomo CRNP 75 mL/hr (03/23/24 0331)    spironolactone  12.5 mg Oral Daily HIEU Skelton          Today, Patient Was Seen By: Arnaud Hawkins DO    **Please Note: This note may have been constructed using a voice recognition system.**

## 2024-03-23 NOTE — CONSULTS
INTERPROFESSIONAL (PHONE) CONSULTATION - Medical Toxicology  Rosalind Wise 83 y.o. female MRN: [id number]  Unit/Bed#: FOUZIA Encounter: [id number]      Reason for Consult / Principal Problem: Overdose  Inpatient consult to Toxicology  Consult performed by: Dimitris Alex DO  Consult ordered by: Susie Ulloa MD        03/22/24      ASSESSMENT:  Polypharmacy Overdose  Atrial fibrillation with RVR    RECOMMENDATIONS:  The patient is described to have some anticholinergic findings, which would be expected given the diphenhydramine in the APAP-PM. There is no evidence of cardiotoxicity. Care remains primarily supportive. Monitor for urinary retention and bladder cath as needed. Manage the A-fib with RVR per usual.     Given possible diuretic OD, monitor I/O and electrolytes with fluids and electrolyte correction as needed.     I agree with NAC given elevated APAP with unknown time of ingestion. CMP in AM. Med tox will manage the NAC.     For further questions, please contact the medical  on call via [place] [employer] between 8am and 9pm. If between 9pm and 8am, please reach out to the [facility] at [phone].     Please see additional teaching note below:    Hx and PE limited by the dynamics of a phone consultation. I have not personally interviewed or evaluated the patient, but only advised based on the information provided to me. Primary provider is responsible for all clinical decisions.     Pertinent history, physical exam and clinical findings and course discussed: Rosalind Wise is a 83 y.o. year old female who presents with a reported overdose of hydralazine, tylenol PM, and aldactone. She was found at home in urine and feces. In the ED, she is awake and alert, but appears to have some anticholinergic findings.     Review of systems and physical exam not performed by me.    Historical Information   Past Medical History:   Diagnosis Date    Anxiety     Arthritis     joints    Bunion     Cataract      Disease of thyroid gland     Eczema     GERD (gastroesophageal reflux disease)     Gout     Heart failure (HCC)     Hepatitis     Hiatal hernia     Hypertension     Onychomycosis     last assessed: 16    Orthopnea     resolved: 16    Pseudogout of left wrist     Sleep apnea     wears CPAP    Stroke (HCC)      Past Surgical History:   Procedure Laterality Date    ABDOMINAL SURGERY          BRAIN HEMATOMA EVACUATION Right 2020    Procedure: Right decompressive craniectomy and evacuation of intraparenchymal hematoma;  Surgeon: Apolinar Herrera MD;  Location: [facility];  Service: Neurosurgery    BREAST SURGERY Right     cyst removal    CARPAL TUNNEL RELEASE Left 1989    CARPAL TUNNEL RELEASE Right 2004    CATARACT EXTRACTION       SECTION  1960    x1    COLONOSCOPY N/A 2018    Procedure: COLONOSCOPY;  Surgeon: Alejandro Carlson MD;  Location: [facility];  Service: Gastroenterology    DILATION AND CURETTAGE OF UTERUS  1967    EYE SURGERY Left 2006    cataracts    HERNIA REPAIR      umbilical hernia    INCISIONAL HERNIA REPAIR      incarcerated    KNEE ARTHROSCOPY Right     ID RPLCMT BONE FLAP/PROSTHETIC PLATE SKULL Right 2020    Procedure: right frontotemporal replacement cranioplasty;  Surgeon: Apolinar Herrera MD;  Location:  MAIN OR;  Service: Neurosurgery    ID XCAPSL CTRC RMVL INSJ IO LENS PROSTH W/O ECP Right 3/27/2017    Procedure: EXTRACTION EXTRACAPSULAR CATARACT PHACO INTRAOCULAR LENS (IOL);  Surgeon: Trip Gilbert MD;  Location: [facility] MA[facility] OR;  Service: Ophthalmology     Social History   Social History     Substance and Sexual Activity   Alcohol Use Yes    Comment: Only on special occasions     Social History     Substance and Sexual Activity   Drug Use Never     Social History     Tobacco Use   Smoking Status Never   Smokeless Tobacco Never     Family History   Problem Relation Age of Onset    Diabetes Mother     Coronary artery disease Mother     Arthritis Mother      Hypertension Mother     Hypertension Father     Diabetes Brother     Diabetes Son     Arthritis Family         Prior to Admission medications    Medication Sig Start Date End Date Taking? Authorizing Provider   amLODIPine (NORVASC) 10 mg tablet Take 0.5 tablets (5 mg total) by mouth daily 5/8/23   Damon Yoon MD   apixaban (Eliquis) 2.5 mg Take 1 tablet (2.5 mg total) by mouth 2 (two) times a day 10/27/23   Damon Yoon MD   atorvastatin (LIPITOR) 40 mg tablet Take 1 tablet (40 mg total) by mouth every evening 12/13/23   Suzanna Lopez MD   bumetanide (BUMEX) 1 mg tablet Take 1 tablet (1 mg total) by mouth daily 2/12/24   Ele Miller MD   Diclofenac Sodium (VOLTAREN) 1 % Apply 2 g topically in the morning APPLY NO MORE THAN 3 DAYS IN A ROW THEN TAKE A BREAK FOR ONE WEEK FROM USE. 7/18/23   Ele Miller MD   gabapentin (NEURONTIN) 100 mg capsule Take 1 tablet at bedtime.  May increase to 2 tablets after 3 days  Patient not taking: Reported on 12/15/2023 6/5/23   Luis Marie MD   ketoconazole (NIZORAL) 2 % cream Apply topically daily 3/24/23 4/23/23  Peter Sheets DPM   levothyroxine 88 mcg tablet TAKE 1 TABLET EVERY DAY 12/4/23   Suzanna Lopez MD   losartan (COZAAR) 25 mg tablet TAKE 2 TABLETS IN THE MORNING AND TAKE 1 TABLET IN THE EVENING 10/18/23   Ele Miller MD   metoprolol tartrate (LOPRESSOR) 25 mg tablet Take 0.5 tablets (12.5 mg total) by mouth every 12 (twelve) hours 2/5/24   Damon Yoon MD   pantoprazole (PROTONIX) 20 mg tablet TAKE 1 TABLET EVERY DAY 12/4/23   Suzanna Lopez MD   senna-docusate sodium (SENOKOT S) 8.6-50 mg per tablet Take 1 tablet by mouth daily  Patient not taking: Reported on 12/18/2023 7/18/23   Ele Miller MD   spironolactone (ALDACTONE) 25 mg tablet Take 0.5 tablets (12.5 mg total) by mouth daily 11/30/23   Ele Miller MD       Current Facility-Administered Medications   Medication Dose Route Frequency    acetylcysteine  (ACETADOTE) 3,515 mg in dextrose 5 % 500 mL IVPB  50 mg/kg Intravenous Once    acetylcysteine (ACETADOTE) 7,030 mg in dextrose 5 % 1,000 mL IVPB  100 mg/kg Intravenous Once    diltiazem (CARDIZEM) 125 mg in sodium chloride 0.9 % 125 mL infusion  1-15 mg/hr Intravenous Titrated       No Known Allergies    Objective       Intake/Output Summary (Last 24 hours) at 3/22/2024 2113  Last data filed at 3/22/2024 2051  Gross per 24 hour   Intake 700 ml   Output --   Net 700 ml       Invasive Devices:   Peripheral IV 03/22/24 Right Antecubital (Active)       Peripheral IV 03/22/24 Left Antecubital (Active)       Vitals   Vitals:    03/22/24 2015 03/22/24 2030 03/22/24 2046 03/22/24 2103   BP: 114/65 126/76  127/60   TempSrc:       Pulse:  (!) 116 (!) 120 (!) 120   Resp:       Patient Position - Orthostatic VS:       Temp:             EKG, Pathology, and/or Other Studies: I have personally reviewed pertinent reports.      EKG: A-fib at 130. RBBB. Unchanged from prior.     Lab Results: I have personally reviewed pertinent reports.      Labs:    Results from last 7 days   Lab Units 03/22/24  1826   WBC Thousand/uL 9.75   HEMOGLOBIN g/dL 12.3   HEMATOCRIT % 39.4   PLATELETS Thousands/uL 173   LYMPHO PCT % 8*   MONO PCT % 4   EOS PCT % 0      Results from last 7 days   Lab Units 03/22/24  1826   SODIUM mmol/L 137   POTASSIUM mmol/L 4.1   CHLORIDE mmol/L 100   CO2 mmol/L 27   BUN mg/dL 33*   CREATININE mg/dL 1.24   CALCIUM mg/dL 9.6   ALK PHOS U/L 102   ALT U/L 12   AST U/L 18          Results from last 7 days   Lab Units 03/22/24  1826   LACTIC ACID mmol/L 1.5     0   Lab Value Date/Time    TROPONINI 0.07 (H) 03/25/2021 0516    TROPONINI 0.07 (H) 03/24/2021 2131    TROPONINI 0.07 (H) 03/24/2021 1738    TROPONINI 0.06 (H) 03/24/2021 1149    TROPONINI 0.05 (H) 02/10/2020 2005    TROPONINI 0.02 03/09/2016 1642     Results from last 7 days   Lab Units 03/22/24 1955   PH ALESIA  7.322   PCO2 ALESIA mm Hg 52.1*   PO2 ALESIA mm Hg 36.1   HCO3  "ALESIA mmol/L 26.4   O2 CONTENT ALESIA ml/dL 12.5   O2 HGB, VENOUS % 66.5     Results from last 7 days   Lab Units 03/22/24  1826   ACETAMINOPHEN LVL ug/mL 26*   ETHANOL LVL mg/dL <10   SALICYLATE LVL mg/dL <5     Invalid input(s): \"EXTPREGUR\"      Imaging Studies: I have personally reviewed pertinent reports.      Counseling / Coordination of Care  Total time spent today 29 minutes. This was a phone consultation.       "

## 2024-03-23 NOTE — ASSESSMENT & PLAN NOTE
Patient ingested unknown amounts of Zofran, Atarax, Plavix, Benadryl, Tylenol cold and flu tablets.  Per ED she reports she ingested medications around 10 AM.  Seen midlevel 26.  In the ED Case was discussed with Dr. Alex with toxicology and recommending supportive care and NAC therapy.  Patient received acetylcysteine in the ED and was given 500 mL bolus of normal saline.  Patient with signs of anticholinergic effects  Patient currently on one-to-one for suicidal ideations.  She denies any history of SI.  She reports feeling depressed since the passing of her .  Denies any auditory or visual hallucinations.  Denies any homicidal ideations.  Plan  Appreciate psychiatric recommendations  Continue to monitor on IV fluids  Neurochecks every 4 hours  Urinary retention protocol in place

## 2024-03-23 NOTE — ASSESSMENT & PLAN NOTE
Wt Readings from Last 3 Encounters:   02/27/24 70.3 kg (155 lb)   12/18/23 70.3 kg (155 lb)   12/15/23 70.6 kg (155 lb 9.6 oz)   Appears euvolemic at this time  Echo from October 2023 showing LVEF of 60% with normal systolic function and diastolic dysfunction.  AV with reduced mobility, moderate regurgitation, and mild stenosis.  Trace TV regurgitation moderate MV regurgitation noted  Continue metoprolol and spironolactone  Monitor intake and output  Daily weights I/O's

## 2024-03-23 NOTE — ASSESSMENT & PLAN NOTE
In ED patient noted to be in A-fib with RVR was started on Cardizem drip, now off and rate controlled.  Patient maintained on metoprolol and Eliquis twice daily  Troponins negative x 3.  Continue to monitor on telemetry--A-fib with heart rate 116 present  Cardiology recommending no changes at this time, signed off.

## 2024-03-23 NOTE — ASSESSMENT & PLAN NOTE
Wt Readings from Last 3 Encounters:   03/23/24 71 kg (156 lb 8.4 oz)   02/27/24 70.3 kg (155 lb)   12/18/23 70.3 kg (155 lb)   Appears euvolemic at this time  Echo from October 2023 showing LVEF of 60% with normal systolic function and diastolic dysfunction.  AV with reduced mobility, moderate regurgitation, and mild stenosis.  Trace TV regurgitation moderate MV regurgitation noted  Continue metoprolol and spironolactone  Monitor intake and output  Daily weights I/O's

## 2024-03-23 NOTE — H&P
Formerly [facility]  H&P  Name: Rosalind Wise 83 y.o. female I MRN: [id number]  Unit/Bed#: S -01 I Date of Admission: 3/22/2024   Date of Service: 3/23/2024 I Hospital Day: 1      Assessment/Plan   * Intentional overdose (HCC)  Assessment & Plan  Patient ingested unknown amounts of Zofran, Atarax, Plavix, Benadryl, Tylenol cold and flu tablets.  Per ED she reports she ingested medications around 10 AM.  Seen midlevel 26.  In the ED Case was discussed with Dr. Alex with toxicology and recommending supportive care and NAC therapy.  Patient received acetylcysteine in the ED and was given 500 mL bolus of normal saline.  Patient with signs of anticholinergic effects  Patient currently on one-to-one for suicidal ideations.  She denies any history of SI.  She reports feeling depressed since the passing of her .  Denies any auditory or visual hallucinations.  Denies any homicidal ideations.  Plan  Appreciate psychiatric recommendations  Continue to monitor on IV fluids  Neurochecks every 4 hours  Urinary retention protocol in place    Atrial fibrillation with RVR (HCC)  Assessment & Plan  In ED patient noted to be in A-fib with RVR was started on Cardizem drip  Patient maintained on metoprolol and Eliquis twice daily  Trend troponins  Continue to monitor on telemetry  If she remains elevated consider cardiology consultation      CKD (chronic kidney disease), stage III (HCC)  Assessment & Plan  Lab Results   Component Value Date    EGFR 39 03/22/2024    EGFR 32 12/07/2023    EGFR 33 10/04/2023    CREATININE 1.24 03/22/2024    CREATININE 1.46 (H) 12/07/2023    CREATININE 1.44 (H) 10/04/2023   At baseline continue to trend cmp    Chronic diastolic (congestive) heart failure (HCC)  Assessment & Plan  Wt Readings from Last 3 Encounters:   02/27/24 70.3 kg (155 lb)   12/18/23 70.3 kg (155 lb)   12/15/23 70.6 kg (155 lb 9.6 oz)   Appears euvolemic at this time  Echo from October 2023 showing LVEF  of 60% with normal systolic function and diastolic dysfunction.  AV with reduced mobility, moderate regurgitation, and mild stenosis.  Trace TV regurgitation moderate MV regurgitation noted  Continue metoprolol and spironolactone  Monitor intake and output  Daily weights I/O's    Benign essential hypertension  Assessment & Plan  Continue PTA losartan, Aldactone, and metoprolol  Blood pressures appear stable at this time  Avoid hypotension       VTE Pharmacologic Prophylaxis: VTE Score: 3 Moderate Risk (Score 3-4) - Pharmacological DVT Prophylaxis Ordered: apixaban (Eliquis).  Code Status: Level 3 - DNAR and DNI   Discussion with family: Updated  (son) via phone.    Anticipated Length of Stay: Patient will be admitted on an observation basis with an anticipated length of stay of less than 2 midnights secondary to intentional overdose.    Total Time Spent on Date of Encounter in care of patient: 75 mins. This time was spent on one or more of the following: performing physical exam; counseling and coordination of care; obtaining or reviewing history; documenting in the medical record; reviewing/ordering tests, medications or procedures; communicating with other healthcare professionals and discussing with patient's family/caregivers.    Chief Complaint: Overdose    History of Present Illness:  Rosalind Wise is a 83 y.o. female with a PMH of CHF, A-fib, CKD stage III, hypothyroid, hyperlipidemia, GERD, sciatica, prediabetes who presents with intentional overdose.    Patient presents secondary to intentional drug overdose.  Patient reports she has been feeling upset since the passing of her .  She attempted to kill herself by ingesting medications which she is unable to recall names of them.  Per ED note she ingested Zofran, hydralazine, Benadryl, and Tylenol PM.  Family found her drowsy prior to calling 911.  She reports that she has never been on medication for anxiety or depression.  She  reports history of suicidal ideations but is never acted on it.  She denies any homicidal ideations, visual hallucinations, or auditory hallucinations.  She denies any illicit drug use, tobacco use, or alcohol use.      Review of Systems:  Review of Systems   Constitutional:  Negative for activity change, fatigue and fever.   HENT:  Negative for rhinorrhea, trouble swallowing and voice change.    Eyes:  Negative for photophobia and visual disturbance.   Respiratory:  Negative for cough, choking, chest tightness and shortness of breath.    Cardiovascular:  Negative for chest pain, palpitations and leg swelling.   Gastrointestinal:  Negative for abdominal distention, abdominal pain, blood in stool, constipation, diarrhea and nausea.   Genitourinary:  Negative for difficulty urinating, frequency and urgency.   Musculoskeletal: Negative.    Skin: Negative.    Neurological:  Negative for dizziness, tremors, syncope, facial asymmetry, speech difficulty, weakness, light-headedness and headaches.   Psychiatric/Behavioral:  Positive for self-injury and suicidal ideas. Negative for agitation, confusion and hallucinations. The patient is not nervous/anxious.        Past Medical and Surgical History:   Past Medical History:   Diagnosis Date    Anxiety     Arthritis     joints    Bunion     Cataract     Disease of thyroid gland     Eczema     GERD (gastroesophageal reflux disease)     Gout     Heart failure (HCC)     Hepatitis     Hiatal hernia     Hypertension     Onychomycosis     last assessed: 16    Orthopnea     resolved: 16    Pseudogout of left wrist     Sleep apnea     wears CPAP    Stroke (HCC)        Past Surgical History:   Procedure Laterality Date    ABDOMINAL SURGERY          BRAIN HEMATOMA EVACUATION Right 2020    Procedure: Right decompressive craniectomy and evacuation of intraparenchymal hematoma;  Surgeon: Apolinar Herrera MD;  Location: [facility];  Service: Neurosurgery    BREAST  SURGERY Right     cyst removal    CARPAL TUNNEL RELEASE Left     CARPAL TUNNEL RELEASE Right     CATARACT EXTRACTION       SECTION  1960    x1    COLONOSCOPY N/A 2018    Procedure: COLONOSCOPY;  Surgeon: Alejandro Carlson MD;  Location: [facility];  Service: Gastroenterology    DILATION AND CURETTAGE OF UTERUS  1967    EYE SURGERY Left 2006    cataracts    HERNIA REPAIR      umbilical hernia    INCISIONAL HERNIA REPAIR      incarcerated    KNEE ARTHROSCOPY Right     NY RPLCMT BONE FLAP/PROSTHETIC PLATE SKULL Right 2020    Procedure: right frontotemporal replacement cranioplasty;  Surgeon: Apolinar Herrera MD;  Location:  MAIN OR;  Service: Neurosurgery    NY XCAPSL CTRC RMVL INSJ IO LENS PROSTH W/O ECP Right 3/27/2017    Procedure: EXTRACTION EXTRACAPSULAR CATARACT PHACO INTRAOCULAR LENS (IOL);  Surgeon: Trip Gilbert MD;  Location: [facility] MAIN OR;  Service: Ophthalmology       Meds/Allergies:  Prior to Admission medications    Medication Sig Start Date End Date Taking? Authorizing Provider   amLODIPine (NORVASC) 10 mg tablet Take 0.5 tablets (5 mg total) by mouth daily 23   Damon Yoon MD   apixaban (Eliquis) 2.5 mg Take 1 tablet (2.5 mg total) by mouth 2 (two) times a day 10/27/23   Damon Yoon MD   atorvastatin (LIPITOR) 40 mg tablet Take 1 tablet (40 mg total) by mouth every evening 23   Suzanna Lopez MD   bumetanide (BUMEX) 1 mg tablet Take 1 tablet (1 mg total) by mouth daily 24   Ele Miller MD   Diclofenac Sodium (VOLTAREN) 1 % Apply 2 g topically in the morning APPLY NO MORE THAN 3 DAYS IN A ROW THEN TAKE A BREAK FOR ONE WEEK FROM USE. 23   Ele Miller MD   gabapentin (NEURONTIN) 100 mg capsule Take 1 tablet at bedtime.  May increase to 2 tablets after 3 days  Patient not taking: Reported on 12/15/2023 6/5/23   Luis Marie MD   ketoconazole (NIZORAL) 2 % cream Apply topically daily 3/24/23 4/23/23  Peter Sheets DPM   levothyroxine 88  mcg tablet TAKE 1 TABLET EVERY DAY 12/4/23   Suzanna Lopez MD   losartan (COZAAR) 25 mg tablet TAKE 2 TABLETS IN THE MORNING AND TAKE 1 TABLET IN THE EVENING 10/18/23   Ele Miller MD   metoprolol tartrate (LOPRESSOR) 25 mg tablet Take 0.5 tablets (12.5 mg total) by mouth every 12 (twelve) hours 2/5/24   Damon Yoon MD   pantoprazole (PROTONIX) 20 mg tablet TAKE 1 TABLET EVERY DAY 12/4/23   Suzanna Lopez MD   senna-docusate sodium (SENOKOT S) 8.6-50 mg per tablet Take 1 tablet by mouth daily  Patient not taking: Reported on 12/18/2023 7/18/23   Ele Miller MD   spironolactone (ALDACTONE) 25 mg tablet Take 0.5 tablets (12.5 mg total) by mouth daily 11/30/23   Ele Miller MD     I have reviewed home medications with patient personally.    Allergies: No Known Allergies    Social History:  Marital Status:    Occupation: na  Patient Pre-hospital Living Situation: Home  Patient Pre-hospital Level of Mobility: walks  Patient Pre-hospital Diet Restrictions: none  Substance Use History:   Social History     Substance and Sexual Activity   Alcohol Use Yes    Comment: Only on special occasions     Social History     Tobacco Use   Smoking Status Never   Smokeless Tobacco Never     Social History     Substance and Sexual Activity   Drug Use Never       Family History:  Family History   Problem Relation Age of Onset    Diabetes Mother     Coronary artery disease Mother     Arthritis Mother     Hypertension Mother     Hypertension Father     Diabetes Brother     Diabetes Son     Arthritis Family        Physical Exam:     Vitals:   Blood Pressure: 115/55 (03/23/24 0233)  Pulse: 61 (03/23/24 0233)  Temperature: 97.6 °F (36.4 °C) (03/22/24 2328)  Temp Source: Oral (03/22/24 1829)  Respirations: 16 (03/22/24 2125)  Weight - Scale: 70.8 kg (156 lb 1.4 oz) (03/22/24 2251)  SpO2: 93 % (03/23/24 0233)    Physical Exam  Constitutional:       General: She is not in acute distress.     Appearance:  Normal appearance. She is not ill-appearing.   Eyes:      General: No scleral icterus.     Extraocular Movements: Extraocular movements intact.      Pupils: Pupils are equal, round, and reactive to light.   Cardiovascular:      Rate and Rhythm: Tachycardia present. Rhythm irregular.      Heart sounds: No murmur heard.  Pulmonary:      Effort: Pulmonary effort is normal. No respiratory distress.      Breath sounds: No wheezing or rales.   Abdominal:      General: There is no distension.      Tenderness: There is no abdominal tenderness. There is no guarding.   Musculoskeletal:      Right lower leg: No edema.      Left lower leg: No edema.   Skin:     General: Skin is warm and dry.      Coloration: Skin is pale. Skin is not jaundiced.   Neurological:      Mental Status: She is alert and oriented to person, place, and time.      GCS: GCS eye subscore is 4. GCS verbal subscore is 5. GCS motor subscore is 6.      Cranial Nerves: No dysarthria or facial asymmetry.      Sensory: Sensation is intact.      Motor: No weakness, tremor or seizure activity.   Psychiatric:         Attention and Perception: Attention normal.         Mood and Affect: Mood is depressed.         Speech: Speech normal.         Behavior: Behavior is cooperative.         Thought Content: Thought content is not paranoid. Thought content includes suicidal ideation. Thought content does not include homicidal ideation. Thought content does not include homicidal plan.          Additional Data:     Lab Results:  Results from last 7 days   Lab Units 03/22/24  1826   WBC Thousand/uL 9.75   HEMOGLOBIN g/dL 12.3   HEMATOCRIT % 39.4   PLATELETS Thousands/uL 173   BANDS PCT % 2   LYMPHO PCT % 8*   MONO PCT % 4   EOS PCT % 0     Results from last 7 days   Lab Units 03/22/24  1826   SODIUM mmol/L 137   POTASSIUM mmol/L 4.1   CHLORIDE mmol/L 100   CO2 mmol/L 27   BUN mg/dL 33*   CREATININE mg/dL 1.24   ANION GAP mmol/L 10   CALCIUM mg/dL 9.6   ALBUMIN g/dL 4.4   TOTAL  BILIRUBIN mg/dL 0.79   ALK PHOS U/L 102   ALT U/L 12   AST U/L 18   GLUCOSE RANDOM mg/dL 135         Results from last 7 days   Lab Units 03/22/24  1808   POC GLUCOSE mg/dl 130         Results from last 7 days   Lab Units 03/22/24  1826   LACTIC ACID mmol/L 1.5       Lines/Drains:  Invasive Devices       Peripheral Intravenous Line  Duration             Peripheral IV 03/22/24 Left Antecubital <1 day    Peripheral IV 03/22/24 Right Antecubital <1 day                        Imaging: Personally reviewed the following imaging: chest xray  XR chest 1 view portable    (Results Pending)       EKG and Other Studies Reviewed on Admission:   EKG:  Atrial fibrillation.    ** Please Note: This note has been constructed using a voice recognition system. **

## 2024-03-23 NOTE — ASSESSMENT & PLAN NOTE
Patient ingested unknown amounts of Zofran, Atarax, Plavix, Benadryl, Tylenol cold and flu tablets.  Per ED she reports she ingested medications around 10 AM.  In the ED Case was discussed with Dr. Alex with toxicology and recommending supportive care and NAC therapy.  Patient received acetylcysteine in the ED and was given 500 mL bolus of normal saline.  Patient with signs of anticholinergic effects  Patient currently on one-to-one for suicidal ideations.  She denies any history of SI.  She reports feeling depressed since the passing of her .  Denies any auditory or visual hallucinations.  Denies any homicidal ideations.  States she feels better today and regrets what she has done    Plan  Appreciate psychiatric recommendations  Continue to monitor on IV fluids  Neurochecks every 4 hours  Urinary retention protocol in place

## 2024-03-23 NOTE — ASSESSMENT & PLAN NOTE
Lab Results   Component Value Date    EGFR 45 03/24/2024    EGFR 48 03/23/2024    EGFR 39 03/22/2024    CREATININE 1.11 03/24/2024    CREATININE 1.05 03/23/2024    CREATININE 1.24 03/22/2024   At baseline continue to trend bmp

## 2024-03-23 NOTE — QUICK NOTE
Med Tox Follow Up    Labs reviewed. Transaminases normal this AM. No further NAC needed. I discontinued NAC. Pt is stable from a tox standpoint. Med tox will be available as needed.

## 2024-03-23 NOTE — PLAN OF CARE
Problem: PAIN - ADULT  Goal: Verbalizes/displays adequate comfort level or baseline comfort level  Description: Interventions:  - Encourage patient to monitor pain and request assistance  - Assess pain using appropriate pain scale  - Administer analgesics based on type and severity of pain and evaluate response  - Implement non-pharmacological measures as appropriate and evaluate response  - Consider cultural and social influences on pain and pain management  - Notify physician/advanced practitioner if interventions unsuccessful or patient reports new pain  Outcome: Progressing     Problem: SAFETY ADULT  Goal: Patient will remain free of falls  Description: INTERVENTIONS:  - Educate patient/family on patient safety including physical limitations  - Instruct patient to call for assistance with activity   - Consult OT/PT to assist with strengthening/mobility   - Keep Call bell within reach  - Keep bed low and locked with side rails adjusted as appropriate  - Keep care items and personal belongings within reach  - Initiate and maintain comfort rounds  - Make Fall Risk Sign visible to staff  - Offer Toileting every 2 Hours, in advance of need  - Initiate/Maintain bed/chair alarm  - Obtain necessary fall risk management equipment  - Apply yellow socks and bracelet for high fall risk patients  - Consider moving patient to room near nurses station  Outcome: Progressing  Goal: Maintain or return to baseline ADL function  Description: INTERVENTIONS:  -  Assess patient's ability to carry out ADLs; assess patient's baseline for ADL function and identify physical deficits which impact ability to perform ADLs (bathing, care of mouth/teeth, toileting, grooming, dressing, etc.)  - Assess/evaluate cause of self-care deficits   - Assess range of motion  - Assess patient's mobility; develop plan if impaired  - Assess patient's need for assistive devices and provide as appropriate  - Encourage maximum independence but intervene and  supervise when necessary  - Involve family in performance of ADLs  - Assess for home care needs following discharge   - Consider OT consult to assist with ADL evaluation and planning for discharge  - Provide patient education as appropriate  Outcome: Progressing  Goal: Maintains/Returns to pre admission functional level  Description: INTERVENTIONS:  - Perform AM-PAC 6 Click Basic Mobility/ Daily Activity assessment daily.  - Set and communicate daily mobility goal to care team and patient/family/caregiver.   - Collaborate with rehabilitation services on mobility goals if consulted  - Perform Range of Motion 3 times a day.  - Reposition patient every 2 hours.  - Dangle patient 3 times a day  - Stand patient 3 times a day  - Ambulate patient 3 times a day  - Out of bed to chair 3 times a day   - Out of bed for meals 3 times a day  - Out of bed for toileting  - Record patient progress and toleration of activity level   Outcome: Progressing     Problem: DISCHARGE PLANNING  Goal: Discharge to home or other facility with appropriate resources  Description: INTERVENTIONS:  - Identify barriers to discharge w/patient and caregiver  - Arrange for needed discharge resources and transportation as appropriate  - Identify discharge learning needs (meds, wound care, etc.)  - Arrange for interpretive services to assist at discharge as needed  - Refer to Case Management Department for coordinating discharge planning if the patient needs post-hospital services based on physician/advanced practitioner order or complex needs related to functional status, cognitive ability, or social support system  Outcome: Progressing     Problem: Knowledge Deficit  Goal: Patient/family/caregiver demonstrates understanding of disease process, treatment plan, medications, and discharge instructions  Description: Complete learning assessment and assess knowledge base.  Interventions:  - Provide teaching at level of understanding  - Provide teaching via  preferred learning methods  Outcome: Progressing     Problem: Potential for Falls  Goal: Patient will remain free of falls  Description: INTERVENTIONS:  - Educate patient/family on patient safety including physical limitations  - Instruct patient to call for assistance with activity   - Consult OT/PT to assist with strengthening/mobility   - Keep Call bell within reach  - Keep bed low and locked with side rails adjusted as appropriate  - Keep care items and personal belongings within reach  - Initiate and maintain comfort rounds  - Make Fall Risk Sign visible to staff  - Offer Toileting every 2 Hours, in advance of need  - Initiate/Maintain bed/chair alarm  - Obtain necessary fall risk management equipment  - Apply yellow socks and bracelet for high fall risk patients  - Consider moving patient to room near nurses station  Outcome: Progressing

## 2024-03-23 NOTE — ASSESSMENT & PLAN NOTE
Lab Results   Component Value Date    EGFR 39 03/22/2024    EGFR 32 12/07/2023    EGFR 33 10/04/2023    CREATININE 1.24 03/22/2024    CREATININE 1.46 (H) 12/07/2023    CREATININE 1.44 (H) 10/04/2023   At baseline continue to trend cmp

## 2024-03-23 NOTE — CONSULTS
Consultation - Cardiology   Rosalind POONAM Wise 83 y.o. female MRN: [id number]  Unit/Bed#: S -01 Encounter: [id number]  03/23/24  9:25 AM          Impression:    83-year-old with PAF-on metoprolol 12.5 twice daily, apixaban 2.5 twice daily, prior history of intraparenchymal hemorrhage, chronic heart failure with preserved ejection fractioon bumetanide 1 mg daily, admitted with intentional drug overdose due to grief after her 's passing, given N-acetylcysteine, was also found to be in atrial fibrillation with rapid rates and was briefly on diltiazem drip and now back on her home dose of metoprolol, diltiazem drip discontinued with rates now being controlled and also euvolemic on exam on her home dose of Bumex which has been resumed as well.    Plan:    Atrial fibrillation: Presented with rapid rates in the setting of noncompliance  Currently rates are controlled-on her baseline metoprolol dose  Continue anticoagulation with apixaban-2.5 mg twice daily-previous history of intraparenchymal hemorrhage.  No change in this regard.    Chronic heart failure with preserved ejection fraction: At baseline on Bumex 1 mg daily, previously was on 2 mg daily but in the setting of dizziness, mild rise in creatinine, decreased by nephrology in December 2023.  Euvolemic at this time, no changes At this time    Moderate to severe MR: Medical management alone at this time.  Will see patient as needed, will sign off, please call with questions or concerns    Grief/depression/suicidal ideation: Will defer to the primary team      Assessment:  Principal Problem:    Intentional overdose (HCC)  Active Problems:    Benign essential hypertension    Chronic diastolic (congestive) heart failure (HCC)    CKD (chronic kidney disease), stage III (HCC)    Atrial fibrillation with RVR (HCC)    Chief Complaint   Patient presents with    Overdose - Intentional     Pt took handfuls of her home medication, reported intentional. Denies hi.  Family found her at home drousy prior to calling 911. Zofran, hydralazine, bendaryl, tylenol PM. Reports she took it around 1000     Admitting diagnosis:  Overdose [T50.901A]  Suicide attempt (HCC) [T14.91XA]  A-fib ([facility]) [I48.91]  Depression [F32.A]  OD (overdose of drug), intentional self-harm, initial encounter ([facility]) [T50.902A]    History of Present Illness   Physician Requesting Consult: Xu Sarmiento MD   Reason for Consult / Principal Problem: Atrial fibrillation, rapid rates  HPI: Rosalind Wise is a Rosalind is an 83-year-old lady with a history of paroxysmal atrial fibrillation, moderate to severe MR on last echo in October 2023, at baseline on bumetanide 1 mg daily, in the past had been on 2 mg daily in October 2023 but decreased in December 2023 for mild acute kidney injury and dizziness, for rate control on metoprolol 12.5 mg and on apixaban 2.5 mg twice daily for anticoagulation, with a prior history of brain-intraparenchymal hemorrhage, admitted with intentional drug overdose-suicide attempt in the setting of grief after the passing of her -might have taken Zofran hydralazine Benadryl and Tylenol, given N-acetylcysteine, was also found to be in atrial fibrillation with rapid rates for which cardiology was consulted.  Her home medications were resumed.  Briefly she had been on diltiazem drip but now discontinued and back on her home dose of metoprolol 12.5 mg daily.  Her home Bumex 1 mg daily and apixaban have been resumed as well.    Inpatient consult to Cardiology  Consult performed by: Reyes López MD  Consult ordered by: HIEU Skelton          Review of Systems:  generally weak  Review of Systems   Constitutional: Negative.    HENT: Negative.     Eyes: Negative.    Respiratory: Negative.     Cardiovascular: Negative.    Endocrine: Negative.    Psychiatric/Behavioral:  Positive for dysphoric mood.        Historical Information   Past Medical History:   Diagnosis Date     Anxiety     Arthritis     joints    Bunion     Cataract     Disease of thyroid gland     Eczema     GERD (gastroesophageal reflux disease)     Gout     Heart failure (HCC)     Hepatitis     Hiatal hernia     Hypertension     Onychomycosis     last assessed: 16    Orthopnea     resolved: 16    Pseudogout of left wrist     Sleep apnea     wears CPAP    Stroke (HCC)      Past Surgical History:   Procedure Laterality Date    ABDOMINAL SURGERY          BRAIN HEMATOMA EVACUATION Right 2020    Procedure: Right decompressive craniectomy and evacuation of intraparenchymal hematoma;  Surgeon: Apolinar Herrera MD;  Location: [facility];  Service: Neurosurgery    BREAST SURGERY Right     cyst removal    CARPAL TUNNEL RELEASE Left 1989    CARPAL TUNNEL RELEASE Right     CATARACT EXTRACTION       SECTION  1960    x1    COLONOSCOPY N/A 2018    Procedure: COLONOSCOPY;  Surgeon: Alejandro Carlson MD;  Location: [facility];  Service: Gastroenterology    DILATION AND CURETTAGE OF UTERUS  1967    EYE SURGERY Left 2006    cataracts    HERNIA REPAIR      umbilical hernia    INCISIONAL HERNIA REPAIR      incarcerated    KNEE ARTHROSCOPY Right     KS RPLCMT BONE FLAP/PROSTHETIC PLATE SKULL Right 2020    Procedure: right frontotemporal replacement cranioplasty;  Surgeon: Apolinar Herrera MD;  Location:  MAIN OR;  Service: Neurosurgery    KS XCAPSL CTRC RMVL INSJ IO LENS PROSTH W/O ECP Right 3/27/2017    Procedure: EXTRACTION EXTRACAPSULAR CATARACT PHACO INTRAOCULAR LENS (IOL);  Surgeon: Trip Gilbert MD;  Location: [facility] MAIN OR;  Service: Ophthalmology     Social History     Substance and Sexual Activity   Alcohol Use Yes    Comment: Only on special occasions     Social History     Substance and Sexual Activity   Drug Use Never     Social History     Tobacco Use   Smoking Status Never   Smokeless Tobacco Never     Family History:   Family History   Problem Relation Age of Onset    Diabetes  Mother     Coronary artery disease Mother     Arthritis Mother     Hypertension Mother     Hypertension Father     Diabetes Brother     Diabetes Son     Arthritis Family      No family history of premature CAD or Sudden Cardiac Death    Meds/Allergies     Current Facility-Administered Medications:     apixaban (ELIQUIS) tablet 2.5 mg, 2.5 mg, Oral, BID, Dillon Richardson-Guille, CRNP, 2.5 mg at 03/23/24 0836    atorvastatin (LIPITOR) tablet 40 mg, 40 mg, Oral, QPM, Dillon Richardson-Guille, CRNP, 40 mg at 03/22/24 2308    bumetanide (BUMEX) tablet 1 mg, 1 mg, Oral, Daily, Dillon Richardson-Guille, CRNP, 1 mg at 03/23/24 0836    levothyroxine tablet 88 mcg, 88 mcg, Oral, Daily, Dillon Richardson-Guille, CRNP, 88 mcg at 03/23/24 0836    losartan (COZAAR) tablet 25 mg, 25 mg, Oral, Daily, Dillon Richardson-Guille, CRNP, 25 mg at 03/23/24 0836    melatonin tablet 3 mg, 3 mg, Oral, HS, Camille Hutton MD, 3 mg at 03/23/24 0224    metoprolol tartrate (LOPRESSOR) partial tablet 12.5 mg, 12.5 mg, Oral, Q12H, Dillon Richardson-Guille, CRNP, 12.5 mg at 03/22/24 2308    pantoprazole (PROTONIX) EC tablet 20 mg, 20 mg, Oral, Daily, Dillon Perdomo, CRNP, 20 mg at 03/23/24 0836    senna-docusate sodium (SENOKOT S) 8.6-50 mg per tablet 1 tablet, 1 tablet, Oral, Daily, Dillon Perdomo, CRNP, 1 tablet at 03/23/24 0836    sodium chloride 0.9 % infusion, 75 mL/hr, Intravenous, Continuous, Dillon Richardson-HIEU Han, Last Rate: 75 mL/hr at 03/23/24 0331, 75 mL/hr at 03/23/24 0331    spironolactone (ALDACTONE) tablet 12.5 mg, 12.5 mg, Oral, Daily, Dillon Perdomo, HIEU, 12.5 mg at 03/23/24 0836  No Known Allergies    Objective   Vitals: Blood pressure 131/74, pulse 93, temperature 97.6 °F (36.4 °C), resp. rate 16, weight 71 kg (156 lb 8.4 oz), SpO2 92%, not currently breastfeeding., Body mass index is 29.58 kg/m².,   Orthostatic Blood Pressures      Flowsheet Row Most Recent Value    Blood Pressure 131/74 filed at 03/23/2024 0659   Patient Position - Orthostatic VS Lying filed at 03/22/2024 2000              Intake/Output Summary (Last 24 hours) at 3/23/2024 0925  Last data filed at 3/23/2024 0222  Gross per 24 hour   Intake 700 ml   Output 450 ml   Net 250 ml       Weight (last 2 days)       Date/Time Weight    03/23/24 0600 71 (156.53)    03/22/24 2251 70.8 (156.09)            Invasive Devices       Peripheral Intravenous Line  Duration             Peripheral IV 03/22/24 Left Antecubital <1 day    Peripheral IV 03/22/24 Right Antecubital <1 day                      Physical Exam:  GEN: Rosalind Wise appears okay, alert and oriented x 3, pleasant and cooperative   HEENT: pupils equal, round, and reactive to light; extraocular muscles intact  NECK: supple, no carotid bruits or JVD  HEART: iregular rhythm, normal S1 and S2, no murmurs, no clicks, gallops or rubs   LUNGS: clear to auscultation bilaterally; no wheezes or rhonchi, no rales  ABDOMEN/GI: normal bowel sounds, soft, no tenderness, no distention  EXTREMITIES/Musculoskeltal: peripheral pulses normal; no clubbing, cyanosis, or edema  NEURO: no focal motor findings   SKIN: normal without suspicious lesions on exposed skin      Lab Results:   Admission on 03/22/2024   Component Date Value    Ventricular Rate 03/22/2024 133     Atrial Rate 03/22/2024 131     QRSD Interval 03/22/2024 116     QT Interval 03/22/2024 348     QTC Interval 03/22/2024 517     QRS Axis 03/22/2024 109     T Wave [place] 03/22/2024 -35     POC Glucose 03/22/2024 130     WBC 03/22/2024 9.75     RBC 03/22/2024 4.36     Hemoglobin 03/22/2024 12.3     Hematocrit 03/22/2024 39.4     MCV 03/22/2024 90     MCH 03/22/2024 28.2     MCHC 03/22/2024 31.2 (L)     RDW 03/22/2024 14.4     MPV 03/22/2024 11.1     Platelets 03/22/2024 173     Sodium 03/22/2024 137     Potassium 03/22/2024 4.1     Chloride 03/22/2024 100     CO2 03/22/2024 27     ANION GAP 03/22/2024 10     BUN  03/22/2024 33 (H)     Creatinine 03/22/2024 1.24     Glucose 03/22/2024 135     Calcium 03/22/2024 9.6     AST 03/22/2024 18     ALT 03/22/2024 12     Alkaline Phosphatase 03/22/2024 102     Total Protein 03/22/2024 6.9     Albumin 03/22/2024 4.4     Total Bilirubin 03/22/2024 0.79     eGFR 03/22/2024 39     TSH 3RD GENERATON 03/22/2024 4.099     hs TnI 0hr 03/22/2024 25     Ammonia 03/22/2024 15 (L)     LACTIC ACID 03/22/2024 1.5     Ethanol Lvl 03/22/2024 <10     Salicylate Lvl 03/22/2024 <5     Acetaminophen Level 03/22/2024 26 (HH)     pH, Jack 03/22/2024 7.322     pCO2, Jack 03/22/2024 52.1 (H)     pO2, Jack 03/22/2024 36.1     HCO3, Jack 03/22/2024 26.4     Base Excess, Jack 03/22/2024 -0.4     O2 Content, Jack 03/22/2024 12.5     O2 HGB, VENOUS 03/22/2024 66.5     hs TnI 2hr 03/22/2024 26     Delta 2hr hsTnI 03/22/2024 1     Ventricular Rate 03/22/2024 130     Atrial Rate 03/22/2024 159     QRSD Interval 03/22/2024 126     QT Interval 03/22/2024 360     QTC Interval 03/22/2024 529     QRS Axis 03/22/2024 120     T Wave Axis 03/22/2024 -39     Segmented % 03/22/2024 86 (H)     Bands % 03/22/2024 2     Lymphocytes % 03/22/2024 8 (L)     Monocytes % 03/22/2024 4     Eosinophils % 03/22/2024 0     Basophils % 03/22/2024 0     Absolute Neutrophils 03/22/2024 8.58 (H)     Absolute Lymphocytes 03/22/2024 0.78     Absolute Monocytes 03/22/2024 0.39     Absolute Eosinophils 03/22/2024 0.00     Absolute Basophils 03/22/2024 0.00     RBC Morphology 03/22/2024 Present     Platelet Estimate 03/22/2024 Adequate     Acanthocytes 03/22/2024 Present     Mirza Cells 03/22/2024 Present     Ovalocytes 03/22/2024 Present     Poikilocytes 03/22/2024 Present     hs TnI 4hr 03/22/2024 26     Delta 4hr hsTnI 03/22/2024 1     WBC 03/23/2024 10.41 (H)     RBC 03/23/2024 4.08     Hemoglobin 03/23/2024 11.6     Hematocrit 03/23/2024 36.7     MCV 03/23/2024 90     MCH 03/23/2024 28.4     MCHC 03/23/2024 31.6     RDW 03/23/2024 14.4     Platelets  "03/23/2024 169     MPV 03/23/2024 11.2     Sodium 03/23/2024 136     Potassium 03/23/2024 3.3 (L)     Chloride 03/23/2024 101     CO2 03/23/2024 25     ANION GAP 03/23/2024 10     BUN 03/23/2024 27 (H)     Creatinine 03/23/2024 1.05     Glucose 03/23/2024 143 (H)     Calcium 03/23/2024 8.7     AST 03/23/2024 13     ALT 03/23/2024 11     Alkaline Phosphatase 03/23/2024 81     Total Protein 03/23/2024 6.3 (L)     Albumin 03/23/2024 3.8     Total Bilirubin 03/23/2024 0.57     eGFR 03/23/2024 48          Imaging: Atrial fibrillation, now controlled ratesI have personally reviewed pertinent reports.        EKG/Telemetry: Atrial fibrillation-now controlled rates    Counseling / Coordination of Care    Thank you for allowing us to participate in their care.     This note was completed in part utilizing [employer] direct voice recognition software.   Grammatical errors, random word insertion, spelling mistakes, occasional wrong word or \"sound-alike\" substitutions and incomplete sentences may be an occasional consequence of the system secondary to software limitations, ambient noise and hardware issues. At the time of dictation, efforts were made to edit, clarify and /or correct errors.  Please read the chart carefully and recognize, using context, where substitutions have occurred.  If you have any questions or concerns about the context, text or information contained within the body of this dictation, please contact myself, the provider, for further clarification.    Reyes López MD      "

## 2024-03-23 NOTE — ASSESSMENT & PLAN NOTE
Continue PTA losartan, Aldactone, and metoprolol  Blood pressures appear stable at this time  Avoid hypotension

## 2024-03-23 NOTE — ED ATTENDING ATTESTATION
3/22/2024  ISusie MD, saw and evaluated the patient. I have discussed the patient with the resident/non-physician practitioner and agree with the resident's/non-physician practitioner's findings, Plan of Care, and MDM as documented in the resident's/non-physician practitioner's note, except where noted. All available labs and Radiology studies were reviewed.  I was present for key portions of any procedure(s) performed by the resident/non-physician practitioner and I was immediately available to provide assistance.       At this point I agree with the current assessment done in the Emergency Department.  I have conducted an independent evaluation of this patient a history and physical is as follows:    83-year-old female presenting to emergency department after polypharmacy overdose at home.  Patient is unsure how much or what she took.  States she took around 10 AM.  Feels sleepy and fatigued.  Denies any other complaints.  This was an attempt to hurt herself.  Patient is in A-fib RVR.  No chest pain or trouble breathing.    Agree with workup for overdose including coma Panel, electrolytes, EKG, CBC, urine.  Toxicology consult.    Tylenol is elevated.  Will start NAC.      ED Course         Critical Care Time  Procedures

## 2024-03-24 LAB
ALBUMIN SERPL BCP-MCNC: 3.7 G/DL (ref 3.5–5)
ALP SERPL-CCNC: 78 U/L (ref 34–104)
ALT SERPL W P-5'-P-CCNC: 10 U/L (ref 7–52)
ANION GAP SERPL CALCULATED.3IONS-SCNC: 9 MMOL/L (ref 4–13)
AST SERPL W P-5'-P-CCNC: 12 U/L (ref 13–39)
BILIRUB SERPL-MCNC: 0.51 MG/DL (ref 0.2–1)
BUN SERPL-MCNC: 25 MG/DL (ref 5–25)
CALCIUM SERPL-MCNC: 8.9 MG/DL (ref 8.4–10.2)
CHLORIDE SERPL-SCNC: 105 MMOL/L (ref 96–108)
CO2 SERPL-SCNC: 23 MMOL/L (ref 21–32)
CREAT SERPL-MCNC: 1.11 MG/DL (ref 0.6–1.3)
ERYTHROCYTE [DISTWIDTH] IN BLOOD BY AUTOMATED COUNT: 14.6 % (ref 11.6–15.1)
GFR SERPL CREATININE-BSD FRML MDRD: 45 ML/MIN/1.73SQ M
GLUCOSE SERPL-MCNC: 93 MG/DL (ref 65–140)
HCT VFR BLD AUTO: 35.9 % (ref 34.8–46.1)
HGB BLD-MCNC: 11.6 G/DL (ref 11.5–15.4)
MCH RBC QN AUTO: 28.7 PG (ref 26.8–34.3)
MCHC RBC AUTO-ENTMCNC: 32.3 G/DL (ref 31.4–37.4)
MCV RBC AUTO: 89 FL (ref 82–98)
PLATELET # BLD AUTO: 157 THOUSANDS/UL (ref 149–390)
PMV BLD AUTO: 10.9 FL (ref 8.9–12.7)
POTASSIUM SERPL-SCNC: 3.4 MMOL/L (ref 3.5–5.3)
PROT SERPL-MCNC: 6.1 G/DL (ref 6.4–8.4)
RBC # BLD AUTO: 4.04 MILLION/UL (ref 3.81–5.12)
SODIUM SERPL-SCNC: 137 MMOL/L (ref 135–147)
WBC # BLD AUTO: 10.62 THOUSAND/UL (ref 4.31–10.16)

## 2024-03-24 PROCEDURE — 80053 COMPREHEN METABOLIC PANEL: CPT

## 2024-03-24 PROCEDURE — 99232 SBSQ HOSP IP/OBS MODERATE 35: CPT | Performed by: INTERNAL MEDICINE

## 2024-03-24 PROCEDURE — 85027 COMPLETE CBC AUTOMATED: CPT

## 2024-03-24 RX ADMIN — Medication 3 MG: at 23:35

## 2024-03-24 RX ADMIN — APIXABAN 2.5 MG: 2.5 TABLET, FILM COATED ORAL at 08:52

## 2024-03-24 RX ADMIN — DICLOFENAC SODIUM TOPICAL GEL, 1% 2 G: 10 GEL TOPICAL at 23:35

## 2024-03-24 RX ADMIN — DICLOFENAC SODIUM TOPICAL GEL, 1% 2 G: 10 GEL TOPICAL at 11:35

## 2024-03-24 RX ADMIN — APIXABAN 2.5 MG: 2.5 TABLET, FILM COATED ORAL at 17:16

## 2024-03-24 RX ADMIN — Medication 12.5 MG: at 11:34

## 2024-03-24 RX ADMIN — LEVOTHYROXINE SODIUM 88 MCG: 88 TABLET ORAL at 08:53

## 2024-03-24 RX ADMIN — Medication 12.5 MG: at 23:35

## 2024-03-24 RX ADMIN — BUMETANIDE 1 MG: 1 TABLET ORAL at 08:52

## 2024-03-24 RX ADMIN — SPIRONOLACTONE 12.5 MG: 25 TABLET ORAL at 08:53

## 2024-03-24 RX ADMIN — DICLOFENAC SODIUM TOPICAL GEL, 1% 2 G: 10 GEL TOPICAL at 17:17

## 2024-03-24 RX ADMIN — SENNOSIDES, DOCUSATE SODIUM 1 TABLET: 8.6; 5 TABLET ORAL at 08:52

## 2024-03-24 RX ADMIN — DICLOFENAC SODIUM TOPICAL GEL, 1% 2 G: 10 GEL TOPICAL at 08:59

## 2024-03-24 RX ADMIN — LOSARTAN POTASSIUM 25 MG: 25 TABLET, FILM COATED ORAL at 08:52

## 2024-03-24 RX ADMIN — ATORVASTATIN CALCIUM 40 MG: 40 TABLET, FILM COATED ORAL at 17:16

## 2024-03-24 RX ADMIN — PANTOPRAZOLE SODIUM 20 MG: 20 TABLET, DELAYED RELEASE ORAL at 08:52

## 2024-03-24 NOTE — PLAN OF CARE
Problem: PAIN - ADULT  Goal: Verbalizes/displays adequate comfort level or baseline comfort level  Description: Interventions:  - Encourage patient to monitor pain and request assistance  - Assess pain using appropriate pain scale  - Administer analgesics based on type and severity of pain and evaluate response  - Implement non-pharmacological measures as appropriate and evaluate response  - Consider cultural and social influences on pain and pain management  - Notify physician/advanced practitioner if interventions unsuccessful or patient reports new pain  Outcome: Progressing     Problem: SAFETY ADULT  Goal: Patient will remain free of falls  Description: INTERVENTIONS:  - Educate patient/family on patient safety including physical limitations  - Instruct patient to call for assistance with activity   - Consult OT/PT to assist with strengthening/mobility   - Keep Call bell within reach  - Keep bed low and locked with side rails adjusted as appropriate  - Keep care items and personal belongings within reach  - Initiate and maintain comfort rounds  - Make Fall Risk Sign visible to staff  - Apply yellow socks and bracelet for high fall risk patients  - Consider moving patient to room near nurses station  Outcome: Progressing

## 2024-03-24 NOTE — PROGRESS NOTES
[facility]  Progress Note  Name: Rosalind Wise I  MRN: [id number]  Unit/Bed#: S -01 I Date of Admission: 3/22/2024   Date of Service: 3/24/2024 I Hospital Day: 2    Assessment/Plan   * Intentional overdose (HCC)  Assessment & Plan  Patient ingested unknown amounts of Zofran, Atarax, Plavix, Benadryl, Tylenol cold and flu tablets.  Per ED she reports she ingested medications around 10 AM.  In the ED Case was discussed with Dr. Alex with toxicology and recommending supportive care and NAC therapy.  Patient received acetylcysteine in the ED and was given 500 mL bolus of normal saline.  Patient with signs of anticholinergic effects  Patient currently on one-to-one for suicidal ideations.  She denies any history of SI.  She reports feeling depressed since the passing of her .  Denies any auditory or visual hallucinations.  Denies any homicidal ideations.  States she feels better today and regrets what she has done    Plan  Appreciate psychiatric recommendations  Continue to monitor on IV fluids  Neurochecks every 4 hours  Urinary retention protocol in place    Chronic diastolic (congestive) heart failure (HCC)  Assessment & Plan  Wt Readings from Last 3 Encounters:   03/23/24 71 kg (156 lb 8.4 oz)   02/27/24 70.3 kg (155 lb)   12/18/23 70.3 kg (155 lb)   Appears euvolemic at this time  Echo from October 2023 showing LVEF of 60% with normal systolic function and diastolic dysfunction.  AV with reduced mobility, moderate regurgitation, and mild stenosis.  Trace TV regurgitation moderate MV regurgitation noted  Continue metoprolol and spironolactone  Monitor intake and output  Daily weights I/O's    Atrial fibrillation with RVR (HCC)  Assessment & Plan  In ED patient noted to be in A-fib with RVR was started on Cardizem drip, now off and rate controlled.  Patient maintained on metoprolol and Eliquis twice daily  Troponins negative x 3.  Continue to monitor on telemetry--A-fib with  heart rate 116 present  Cardiology recommending no changes at this time, signed off.      CKD (chronic kidney disease), stage III ([facility])  Assessment & Plan  Lab Results   Component Value Date    EGFR 45 2024    EGFR 48 2024    EGFR 39 2024    CREATININE 1.11 2024    CREATININE 1.05 2024    CREATININE 1.24 2024   At baseline continue to trend bmp    Benign essential hypertension  Assessment & Plan  Continue PTA losartan, Aldactone, and metoprolol  Blood pressures appear stable at this time  Avoid hypotension               VTE Pharmacologic Prophylaxis: VTE Score: 3 Moderate Risk (Score 3-4) - Pharmacological DVT Prophylaxis Ordered: apixaban (Eliquis).    Mobility:   Basic Mobility Inpatient Raw Score: 16  JH-HLM Goal: 5: Stand one or more mins  JH-HLM Achieved: 6: Walk 10 steps or more  JH-HLM Goal achieved. Continue to encourage appropriate mobility.    Patient Centered Rounds: I performed bedside rounds with nursing staff today.  Discussions with Specialists or Other Care Team Provider: None    Education and Discussions with Family / Patient: Updated  (son) via phone.    Current Length of Stay: 2 day(s)  Current Patient Status: Inpatient   Discharge Plan: Anticipate discharge in 24-48 hrs to discharge location to be determined pending rehab evaluations.    Code Status: Level 3 - DNAR and DNI    Subjective:   Patient seen.  No acute events overnight.  Patient states that she understands what she did was wrong and regrets her actions.  Patient was feeling very depressed due to remembering her  who has passed away.  Patient's is otherwise stable and denies any acute concerns or complaints including chest pain, shortness of breath, abdominal pain, nausea, vomiting.    Objective:     Vitals:   Temp (24hrs), Av.5 °F (36.4 °C), Min:97.1 °F (36.2 °C), Max:97.8 °F (36.6 °C)    Temp:  [97.1 °F (36.2 °C)-97.8 °F (36.6 °C)] 97.6 °F (36.4 °C)  HR:  [] 97  Resp:   [14-18] 18  BP: (115-149)/(64-80) 115/69  SpO2:  [89 %-98 %] 96 %  Body mass index is 29.58 kg/m².     Input and Output Summary (last 24 hours):     Intake/Output Summary (Last 24 hours) at 3/24/2024 1048  Last data filed at 3/24/2024 0900  Gross per 24 hour   Intake 450 ml   Output --   Net 450 ml       Physical Exam:   Physical Exam  Vitals and nursing note reviewed.   Constitutional:       General: She is not in acute distress.     Appearance: Normal appearance. She is not ill-appearing.   HENT:      Head: Normocephalic.   Eyes:      General: No scleral icterus.     Extraocular Movements: Extraocular movements intact.      Pupils: Pupils are equal, round, and reactive to light.   Cardiovascular:      Rate and Rhythm: Tachycardia present. Rhythm irregular.      Heart sounds: No murmur heard.  Pulmonary:      Effort: Pulmonary effort is normal. No respiratory distress.      Breath sounds: No wheezing or rales.   Abdominal:      General: There is no distension.      Tenderness: There is no abdominal tenderness. There is no guarding.   Musculoskeletal:      Cervical back: Normal range of motion.      Right lower leg: No edema.      Left lower leg: No edema.   Skin:     General: Skin is warm.      Coloration: Skin is pale. Skin is not jaundiced.   Neurological:      General: No focal deficit present.      Mental Status: She is alert and oriented to person, place, and time.      Cranial Nerves: No dysarthria or facial asymmetry.      Sensory: Sensation is intact.      Motor: No weakness, tremor or seizure activity.   Psychiatric:         Attention and Perception: Attention normal.         Mood and Affect: Mood is depressed.         Speech: Speech normal.         Behavior: Behavior is cooperative.         Thought Content: Thought content is not paranoid. Thought content does not include homicidal ideation. Thought content does not include homicidal or suicidal plan.          Additional Data:     Labs:  Results from last  7 days   Lab Units 03/24/24  0240 03/23/24  0501 03/22/24  1826   WBC Thousand/uL 10.62*   < > 9.75   HEMOGLOBIN g/dL 11.6   < > 12.3   HEMATOCRIT % 35.9   < > 39.4   PLATELETS Thousands/uL 157   < > 173   BANDS PCT %  --   --  2   LYMPHO PCT %  --   --  8*   MONO PCT %  --   --  4   EOS PCT %  --   --  0    < > = values in this interval not displayed.     Results from last 7 days   Lab Units 03/24/24  0240   SODIUM mmol/L 137   POTASSIUM mmol/L 3.4*   CHLORIDE mmol/L 105   CO2 mmol/L 23   BUN mg/dL 25   CREATININE mg/dL 1.11   ANION GAP mmol/L 9   CALCIUM mg/dL 8.9   ALBUMIN g/dL 3.7   TOTAL BILIRUBIN mg/dL 0.51   ALK PHOS U/L 78   ALT U/L 10   AST U/L 12*   GLUCOSE RANDOM mg/dL 93         Results from last 7 days   Lab Units 03/22/24  1808   POC GLUCOSE mg/dl 130         Results from last 7 days   Lab Units 03/22/24  1826   LACTIC ACID mmol/L 1.5       Lines/Drains:  Invasive Devices       Peripheral Intravenous Line  Duration             Peripheral IV 03/22/24 Left Antecubital 1 day                      Telemetry:  Telemetry Orders (From admission, onward)               24 Hour Telemetry Monitoring  Continuous x 24 Hours (Telem)        Question:  Reason for 24 Hour Telemetry  Answer:  Arrhythmias requiring acute medical intervention / PPM or ICD malfunction                     Telemetry Reviewed: Atrial fibrillation. HR averaging 110s  Indication for Continued Telemetry Use: Arrthymias requiring medical therapy             Imaging: Reviewed radiology reports from this admission including: chest xray    Recent Cultures (last 7 days):         Last 24 Hours Medication List:   Current Facility-Administered Medications   Medication Dose Route Frequency Provider Last Rate    apixaban  2.5 mg Oral BID HIEU Skelton      atorvastatin  40 mg Oral QPM HIEU Skelton      bumetanide  1 mg Oral Daily HIEU Skelton      Diclofenac Sodium  2 g Topical 4x Daily [facility]  Jim Hawkins DO      levothyroxine  88 mcg Oral Daily [facility] Conor, CRNP      losartan  25 mg Oral Daily [facility] Conor, CRNP      melatonin  3 mg Oral HS Camille Hutton MD      metoprolol tartrate  12.5 mg Oral Q12H [facility], CR[facility]      pantoprazole  20 mg Oral Daily [facility] Conor, KARMENNP      senna-docusate sodium  1 tablet Oral Daily [facility] Conor, CRNP      spironolactone  12.5 mg Oral Daily [facility] Conor, KARMENNP          Today, Patient Was Seen By: Gokul Gilliam DO    **Please Note: This note may have been constructed using a voice recognition system.**

## 2024-03-25 LAB
ANION GAP SERPL CALCULATED.3IONS-SCNC: 8 MMOL/L (ref 4–13)
BUN SERPL-MCNC: 23 MG/DL (ref 5–25)
CALCIUM SERPL-MCNC: 8.9 MG/DL (ref 8.4–10.2)
CHLORIDE SERPL-SCNC: 104 MMOL/L (ref 96–108)
CO2 SERPL-SCNC: 28 MMOL/L (ref 21–32)
CREAT SERPL-MCNC: 0.96 MG/DL (ref 0.6–1.3)
ERYTHROCYTE [DISTWIDTH] IN BLOOD BY AUTOMATED COUNT: 14.5 % (ref 11.6–15.1)
GFR SERPL CREATININE-BSD FRML MDRD: 54 ML/MIN/1.73SQ M
GLUCOSE SERPL-MCNC: 104 MG/DL (ref 65–140)
HCT VFR BLD AUTO: 35.5 % (ref 34.8–46.1)
HGB BLD-MCNC: 11.5 G/DL (ref 11.5–15.4)
MCH RBC QN AUTO: 28.6 PG (ref 26.8–34.3)
MCHC RBC AUTO-ENTMCNC: 32.4 G/DL (ref 31.4–37.4)
MCV RBC AUTO: 88 FL (ref 82–98)
PLATELET # BLD AUTO: 164 THOUSANDS/UL (ref 149–390)
PMV BLD AUTO: 11.1 FL (ref 8.9–12.7)
POTASSIUM SERPL-SCNC: 3.1 MMOL/L (ref 3.5–5.3)
RBC # BLD AUTO: 4.02 MILLION/UL (ref 3.81–5.12)
SODIUM SERPL-SCNC: 140 MMOL/L (ref 135–147)
WBC # BLD AUTO: 10.68 THOUSAND/UL (ref 4.31–10.16)

## 2024-03-25 PROCEDURE — 99223 1ST HOSP IP/OBS HIGH 75: CPT | Performed by: PSYCHIATRY & NEUROLOGY

## 2024-03-25 PROCEDURE — 80048 BASIC METABOLIC PNL TOTAL CA: CPT

## 2024-03-25 PROCEDURE — 85027 COMPLETE CBC AUTOMATED: CPT

## 2024-03-25 PROCEDURE — 99232 SBSQ HOSP IP/OBS MODERATE 35: CPT | Performed by: INTERNAL MEDICINE

## 2024-03-25 RX ORDER — POTASSIUM CHLORIDE 20 MEQ/1
40 TABLET, EXTENDED RELEASE ORAL ONCE
Status: COMPLETED | OUTPATIENT
Start: 2024-03-25 | End: 2024-03-25

## 2024-03-25 RX ORDER — ACETAMINOPHEN 325 MG/1
325 TABLET ORAL EVERY 8 HOURS PRN
Status: DISCONTINUED | OUTPATIENT
Start: 2024-03-25 | End: 2024-03-26 | Stop reason: HOSPADM

## 2024-03-25 RX ADMIN — Medication 3 MG: at 22:16

## 2024-03-25 RX ADMIN — APIXABAN 2.5 MG: 2.5 TABLET, FILM COATED ORAL at 17:10

## 2024-03-25 RX ADMIN — PANTOPRAZOLE SODIUM 20 MG: 20 TABLET, DELAYED RELEASE ORAL at 08:13

## 2024-03-25 RX ADMIN — POTASSIUM CHLORIDE 40 MEQ: 1500 TABLET, EXTENDED RELEASE ORAL at 07:35

## 2024-03-25 RX ADMIN — ACETAMINOPHEN 325 MG: 325 TABLET, FILM COATED ORAL at 11:24

## 2024-03-25 RX ADMIN — Medication 12.5 MG: at 11:00

## 2024-03-25 RX ADMIN — Medication 12.5 MG: at 22:16

## 2024-03-25 RX ADMIN — BUMETANIDE 1 MG: 1 TABLET ORAL at 08:13

## 2024-03-25 RX ADMIN — DICLOFENAC SODIUM TOPICAL GEL, 1% 2 G: 10 GEL TOPICAL at 08:13

## 2024-03-25 RX ADMIN — ATORVASTATIN CALCIUM 40 MG: 40 TABLET, FILM COATED ORAL at 17:10

## 2024-03-25 RX ADMIN — DICLOFENAC SODIUM TOPICAL GEL, 1% 2 G: 10 GEL TOPICAL at 11:01

## 2024-03-25 RX ADMIN — SPIRONOLACTONE 12.5 MG: 25 TABLET ORAL at 08:13

## 2024-03-25 RX ADMIN — SENNOSIDES, DOCUSATE SODIUM 1 TABLET: 8.6; 5 TABLET ORAL at 08:12

## 2024-03-25 RX ADMIN — APIXABAN 2.5 MG: 2.5 TABLET, FILM COATED ORAL at 08:13

## 2024-03-25 RX ADMIN — LEVOTHYROXINE SODIUM 88 MCG: 88 TABLET ORAL at 08:13

## 2024-03-25 RX ADMIN — LOSARTAN POTASSIUM 25 MG: 25 TABLET, FILM COATED ORAL at 08:12

## 2024-03-25 NOTE — ASSESSMENT & PLAN NOTE
Lab Results   Component Value Date    EGFR 54 03/25/2024    EGFR 45 03/24/2024    EGFR 48 03/23/2024    CREATININE 0.96 03/25/2024    CREATININE 1.11 03/24/2024    CREATININE 1.05 03/23/2024   At baseline continue to trend bmp

## 2024-03-25 NOTE — ASSESSMENT & PLAN NOTE
Wt Readings from Last 3 Encounters:   03/25/24 71.4 kg (157 lb 6.5 oz)   02/27/24 70.3 kg (155 lb)   12/18/23 70.3 kg (155 lb)   Appears euvolemic at this time  Echo from October 2023 showing LVEF of 60% with normal systolic function and diastolic dysfunction.  AV with reduced mobility, moderate regurgitation, and mild stenosis.  Trace TV regurgitation moderate MV regurgitation noted  Continue metoprolol and spironolactone  Potassium repleted  Monitor intake and output  Daily weights I/O's

## 2024-03-25 NOTE — ASSESSMENT & PLAN NOTE
In ED patient noted to be in A-fib with RVR was started on Cardizem drip, now off and rate controlled.  Patient maintained on metoprolol and Eliquis twice daily  Troponins negative x 3.  Continue to monitor on telemetry--A-fib with heart rate 106 present  Cardiology recommending no changes at this time, signed off.

## 2024-03-25 NOTE — ASSESSMENT & PLAN NOTE
In ED patient noted to be in A-fib with RVR was started on Cardizem drip, now off and rate controlled.  Troponins negative x 3.  Continue to monitor on telemetry--A-fib with heart rate 106 present  Cardiology recommending no changes at this time, signed off.  Patient maintained on metoprolol and Eliquis twice daily  Metoprolol increased to 25 mg BID

## 2024-03-25 NOTE — ASSESSMENT & PLAN NOTE
Patient ingested unknown amounts of Zofran, Atarax, Plavix, Benadryl, Tylenol cold and flu tablets.  Per ED she reports she ingested medications around 10 AM.  In the ED Case was discussed with Dr. Alex with toxicology and recommending supportive care and NAC therapy.  Patient received acetylcysteine in the ED and was given 500 mL bolus of normal saline.  Patient with signs of anticholinergic effects  Patient currently on one-to-one for suicidal ideations.  She denies any history of SI.  She reports feeling depressed since the passing of her .  Denies any auditory or visual hallucinations.  Denies any homicidal ideations.  States she feels better today and regrets what she has done    Plan  Appreciate psychiatric recommendations  Patient has accepted a 201  .  Will be transferred to [place] today.  Normal diet

## 2024-03-25 NOTE — PROGRESS NOTES
[facility]  Progress Note  Name: Rosalind Wise I  MRN: [id number]  Unit/Bed#: S -01 I Date of Admission: 3/22/2024   Date of Service: 3/25/2024 I Hospital Day: 3    Assessment/Plan   * Intentional overdose (HCC)  Assessment & Plan  Patient ingested unknown amounts of Zofran, Atarax, Plavix, Benadryl, Tylenol cold and flu tablets.  Per ED she reports she ingested medications around 10 AM.  In the ED Case was discussed with Dr. Alex with toxicology and recommending supportive care and NAC therapy.  Patient received acetylcysteine in the ED and was given 500 mL bolus of normal saline.  Patient with signs of anticholinergic effects  Patient currently on one-to-one for suicidal ideations.  She denies any history of SI.  She reports feeling depressed since the passing of her .  Denies any auditory or visual hallucinations.  Denies any homicidal ideations.  States she feels better today and regrets what she has done    Plan  Appreciate psychiatric recommendations  Normal diet therefore dc iv fluids  Neurochecks every 4 hours  Urinary retention protocol in place    Chronic diastolic (congestive) heart failure (HCC)  Assessment & Plan  Wt Readings from Last 3 Encounters:   03/25/24 71.4 kg (157 lb 6.5 oz)   02/27/24 70.3 kg (155 lb)   12/18/23 70.3 kg (155 lb)   Appears euvolemic at this time  Echo from October 2023 showing LVEF of 60% with normal systolic function and diastolic dysfunction.  AV with reduced mobility, moderate regurgitation, and mild stenosis.  Trace TV regurgitation moderate MV regurgitation noted  Continue metoprolol and spironolactone  Potassium repleted  Monitor intake and output  Daily weights I/O's    Atrial fibrillation with RVR (HCC)  Assessment & Plan  In ED patient noted to be in A-fib with RVR was started on Cardizem drip, now off and rate controlled.  Patient maintained on metoprolol and Eliquis twice daily  Troponins negative x 3.  Continue to monitor  on telemetry--A-fib with heart rate 106 present  Cardiology recommending no changes at this time, signed off.      CKD (chronic kidney disease), stage III ([facility])  Assessment & Plan  Lab Results   Component Value Date    EGFR 54 2024    EGFR 45 2024    EGFR 48 2024    CREATININE 0.96 2024    CREATININE 1.11 2024    CREATININE 1.05 2024   At baseline continue to trend bmp    Benign essential hypertension  Assessment & Plan  Continue PTA losartan, Aldactone, and metoprolol  Blood pressures appear stable at this time  Avoid hypotension               VTE Pharmacologic Prophylaxis: VTE Score: 3 Moderate Risk (Score 3-4) - Pharmacological DVT Prophylaxis Ordered: apixaban (Eliquis).    Mobility:   Basic Mobility Inpatient Raw Score: 16  JH-HLM Goal: 5: Stand one or more mins  JH-HLM Achieved: 5: Stand (1 or more minutes)  JH-HLM Goal achieved. Continue to encourage appropriate mobility.    Patient Centered Rounds: I performed bedside rounds with nursing staff today.  Discussions with Specialists or Other Care Team Provider: None    Education and Discussions with Family / Patient: Updated  (son) via phone.    Current Length of Stay: 3 day(s)  Current Patient Status: Inpatient   Discharge Plan: Anticipate discharge in 24-48 hrs to discharge location to be determined pending rehab evaluations.    Code Status: Level 3 - DNAR and DNI    Subjective:   Patient seen.  No acute events overnight.  Patient is still sad she is in the hospital and stated that she was a little confused this morning because she thought she was at her house.  But when she realized she is at the hospital, she felt sadder.  She is also distressed that she is causing her family hardship by being here and was tearful during our encounter.  Otherwise, she does not report any chest pain, shortness of breath, abdominal pain, nausea, vomiting.    Objective:     Vitals:   Temp (24hrs), Av.5 °F (36.4 °C),  Min:96.8 °F (36 °C), Max:98.1 °F (36.7 °C)    Temp:  [96.8 °F (36 °C)-98.1 °F (36.7 °C)] 97.6 °F (36.4 °C)  HR:  [] 97  Resp:  [16-17] 17  BP: (115-148)/(69-96) 143/70  SpO2:  [91 %-97 %] 94 %  Body mass index is 29.74 kg/m².     Input and Output Summary (last 24 hours):     Intake/Output Summary (Last 24 hours) at 3/25/2024 0751  Last data filed at 3/24/2024 1300  Gross per 24 hour   Intake 210 ml   Output --   Net 210 ml       Physical Exam:   Physical Exam  Vitals and nursing note reviewed.   Constitutional:       General: She is not in acute distress.     Appearance: Normal appearance. She is not ill-appearing.   HENT:      Head: Normocephalic.   Eyes:      General: No scleral icterus.     Extraocular Movements: Extraocular movements intact.      Pupils: Pupils are equal, round, and reactive to light.   Cardiovascular:      Rate and Rhythm: Tachycardia present. Rhythm irregular.      Heart sounds: No murmur heard.  Pulmonary:      Effort: Pulmonary effort is normal. No respiratory distress.      Breath sounds: No wheezing or rales.   Abdominal:      General: There is no distension.      Tenderness: There is no abdominal tenderness. There is no guarding.   Musculoskeletal:      Cervical back: Normal range of motion.      Right lower leg: No edema.      Left lower leg: No edema.   Skin:     General: Skin is warm.      Coloration: Skin is pale. Skin is not jaundiced.   Neurological:      General: No focal deficit present.      Mental Status: She is alert and oriented to person, place, and time.      Cranial Nerves: No dysarthria or facial asymmetry.      Sensory: Sensation is intact.      Motor: No weakness, tremor or seizure activity.   Psychiatric:         Attention and Perception: Attention normal.         Mood and Affect: Mood is depressed.         Speech: Speech normal.         Behavior: Behavior is cooperative.         Thought Content: Thought content is not paranoid. Thought content does not include  homicidal ideation. Thought content does not include homicidal or suicidal plan.          Additional Data:     Labs:  Results from last 7 days   Lab Units 03/25/24  0656 03/23/24  0501 03/22/24  1826   WBC Thousand/uL 10.68*   < > 9.75   HEMOGLOBIN g/dL 11.5   < > 12.3   HEMATOCRIT % 35.5   < > 39.4   PLATELETS Thousands/uL 164   < > 173   BANDS PCT %  --   --  2   LYMPHO PCT %  --   --  8*   MONO PCT %  --   --  4   EOS PCT %  --   --  0    < > = values in this interval not displayed.     Results from last 7 days   Lab Units 03/25/24  0656 03/24/24  0240   SODIUM mmol/L 140 137   POTASSIUM mmol/L 3.1* 3.4*   CHLORIDE mmol/L 104 105   CO2 mmol/L 28 23   BUN mg/dL 23 25   CREATININE mg/dL 0.96 1.11   ANION GAP mmol/L 8 9   CALCIUM mg/dL 8.9 8.9   ALBUMIN g/dL  --  3.7   TOTAL BILIRUBIN mg/dL  --  0.51   ALK PHOS U/L  --  78   ALT U/L  --  10   AST U/L  --  12*   GLUCOSE RANDOM mg/dL 104 93         Results from last 7 days   Lab Units 03/22/24  1808   POC GLUCOSE mg/dl 130         Results from last 7 days   Lab Units 03/22/24  1826   LACTIC ACID mmol/L 1.5       Lines/Drains:  Invasive Devices       Peripheral Intravenous Line  Duration             Peripheral IV 03/22/24 Left Antecubital 2 days                      Telemetry:  Telemetry Orders (From admission, onward)               24 Hour Telemetry Monitoring  Continuous x 24 Hours (Telem)        Question:  Reason for 24 Hour Telemetry  Answer:  Arrhythmias requiring acute medical intervention / PPM or ICD malfunction                     Telemetry Reviewed: Atrial fibrillation. HR averaging 100s  Indication for Continued Telemetry Use: Arrthymias requiring medical therapy             Imaging: No pertinent imaging reviewed.    Recent Cultures (last 7 days):         Last 24 Hours Medication List:   Current Facility-Administered Medications   Medication Dose Route Frequency Provider Last Rate    apixaban  2.5 mg Oral BID HIEU Skelton       atorvastatin  40 mg Oral QPM [facility] Perdomo, CRNP      bumetanide  1 mg Oral Daily [facility] CRNP      Diclofenac Sodium  2 g Topical 4x Daily Arnaud Hawkins DO      levothyroxine  88 mcg Oral Daily [facility] CRNP      losartan  25 mg Oral Daily [facility] CRNP      melatonin  3 mg Oral HS Camille Hutton MD      metoprolol tartrate  12.5 mg Oral Q12H Dillon Perdomo, CRNP      pantoprazole  20 mg Oral Daily [facility] Conor, CRNP      senna-docusate sodium  1 tablet Oral Daily [facility] Conor, CRNP      spironolactone  12.5 mg Oral Daily [facility] CRNP          Today, Patient Was Seen By: Gokul Gilliam DO    **Please Note: This note may have been constructed using a voice recognition system.**

## 2024-03-25 NOTE — DISCHARGE SUMMARY
[facility]  Discharge- Rosalind Wise 1940, 83 y.o. female MRN: [id number]  Unit/Bed#: S -01 Encounter: [id number]  Primary Care Provider: Suzanna Lopez MD   Date and time admitted to hospital: 3/22/2024  6:03 PM    * Intentional overdose (HCC)  Assessment & Plan  Patient ingested unknown amounts of Zofran, Atarax, Plavix, Benadryl, Tylenol cold and flu tablets.  Per ED she reports she ingested medications around 10 AM.  In the ED Case was discussed with Dr. Alex with toxicology and recommending supportive care and NAC therapy.  Patient received acetylcysteine in the ED and was given 500 mL bolus of normal saline.  Patient with signs of anticholinergic effects  Patient currently on one-to-one for suicidal ideations.  She denies any history of SI.  She reports feeling depressed since the passing of her .  Denies any auditory or visual hallucinations.  Denies any homicidal ideations.  States she feels better today and regrets what she has done    Plan  Appreciate psychiatric recommendations  Patient has accepted a 201  .  Will be transferred to [place] today.  Normal diet    Chronic diastolic (congestive) heart failure (HCC)  Assessment & Plan  Wt Readings from Last 3 Encounters:   03/25/24 71.4 kg (157 lb 6.5 oz)   02/27/24 70.3 kg (155 lb)   12/18/23 70.3 kg (155 lb)   Appears euvolemic at this time  Echo from October 2023 showing LVEF of 60% with normal systolic function and diastolic dysfunction.  AV with reduced mobility, moderate regurgitation, and mild stenosis.  Trace TV regurgitation moderate MV regurgitation noted  Continue metoprolol and spironolactone  Potassium repleted  Monitor intake and output  Daily weights I/O's  Increase metoprolol to 25 mg twice daily from today    Atrial fibrillation with RVR (HCC)  Assessment & Plan  In ED patient noted to be in A-fib with RVR was started on Cardizem drip, now off and rate controlled.  Troponins negative x  3.  Continue to monitor on telemetry--A-fib with heart rate 106 present  Cardiology recommending no changes at this time, signed off.  Patient maintained on metoprolol and Eliquis twice daily  Metoprolol increased to 25 mg BID      CKD (chronic kidney disease), stage III (HCC)  Assessment & Plan  Lab Results   Component Value Date    EGFR 60 03/26/2024    EGFR 54 03/25/2024    EGFR 45 03/24/2024    CREATININE 0.88 03/26/2024    CREATININE 0.96 03/25/2024    CREATININE 1.11 03/24/2024   Stable    Benign essential hypertension  Assessment & Plan  Continue PTA losartan, Aldactone, and metoprolol  Blood pressures appear stable at this time  Avoid hypotension        Medical Problems       Resolved Problems  Date Reviewed: 3/26/2024   None       Discharging Resident: Gokul Gilliam DO  Discharging Attending: Andrei Hale MD  PCP: Suzanna Lopez MD  Admission Date:   Admission Orders (From admission, onward)       Ordered        03/22/24 2032  INPATIENT ADMISSION  Once            Signed and Held  ED TO DIFFERENT CAMPUS [facility] UNIT or INPATIENT MEDICAL UNIT to [facility] UNIT (using Discharge Readmit Navigator) - Admit Patient to  Behavioral Health Unit  Once                          Discharge Date: 03/26/24    Consultations During Hospital Stay:  Cardiology  Psychiatry  Toxicology    Procedures Performed:   None    Significant Findings / Test Results:   Acetaminophen level elevated at 26    Incidental Findings:   None    Test Results Pending at Discharge (will require follow up):  None     Outpatient Tests Requested:  As per PCP    Complications:  None    Reason for Admission: Intentional Overdose    Hospital Course:   Rosalind Wise is a 83 y.o. female patient who originally presented to the hospital on 3/22/2024 due to  intentional overdose with suicidal ideations.  Patient ingested unknown amounts of Zofran, Atarax, Plavix, Benadryl, Tylenol cold and flu tablets.  Acetaminophen level was elevated 26 and in the ED,  case was discussed with toxicology who recommended supportive care and NAC therapy.  Patient had signs of anticholinergic effects.  Patient was eventually placed on one-to-one for suicidal ideations although she denies any history of SI.  She reports feeling depressed since her  had passed away and soon began regretting her attempt.  Psychiatry was consulted who saw her yesterday and recommended a 201 which the patient agreed to.  Patient will be transferred to acute behavioral health facility.  Furthermore, patient was noted to be A-fib with RVR and was started on a Cardizem drip.  Patient was eventually maintained on metoprolol and Eliquis twice daily.  Cardiology was consulted who recommended to maintain these medications.  Otherwise, patient has been medically stable and can be transferred to acute behavioral health facility.        Please see above list of diagnoses and related plan for additional information.     Condition at Discharge: stable    Discharge Day Visit / Exam:   Subjective: Patient seen at bedside today.  No acute events overnight.  Patient states that she is fine and has no concerns or complaints at this time.  Patient is lying in bed and feels comfortable.  She understands that she will be transferring to a inpatient psych unit today and is agreeable to it.  Otherwise since no complaints, patient is medically stable.    Vitals: Blood Pressure: 152/85 (03/26/24 0934)  Pulse: (!) 122 (03/26/24 0934)  Temperature: (!) 97.4 °F (36.3 °C) (03/26/24 0934)  Temp Source: Axillary (03/25/24 1505)  Respirations: 16 (03/26/24 0934)  Weight - Scale: 71 kg (156 lb 8.4 oz) (03/26/24 0600)  SpO2: 93 % (03/26/24 0934)  Exam:   Physical Exam  Vitals and nursing note reviewed.   Constitutional:       General: She is not in acute distress.     Appearance: Normal appearance. She is not ill-appearing.   HENT:      Head: Normocephalic.   Eyes:      General: No scleral icterus.     Extraocular Movements:  Extraocular movements intact.      Pupils: Pupils are equal, round, and reactive to light.   Cardiovascular:      Rate and Rhythm: Tachycardia present. Rhythm irregular.      Pulses: Normal pulses.      Heart sounds: No murmur heard.     No friction rub. No gallop.   Pulmonary:      Effort: Pulmonary effort is normal. No respiratory distress.      Breath sounds: No wheezing or rales.   Abdominal:      General: There is no distension.      Tenderness: There is no abdominal tenderness. There is no guarding or rebound.   Musculoskeletal:      Cervical back: Normal range of motion.      Right lower leg: No edema.      Left lower leg: No edema.   Skin:     General: Skin is warm.      Coloration: Skin is not jaundiced or pale.   Neurological:      General: No focal deficit present.      Mental Status: She is alert and oriented to person, place, and time.      Cranial Nerves: No dysarthria or facial asymmetry.      Sensory: Sensation is intact.      Motor: No weakness, tremor or seizure activity.   Psychiatric:         Attention and Perception: Attention normal.         Mood and Affect: Mood is depressed.         Speech: Speech normal.         Behavior: Behavior is cooperative.         Thought Content: Thought content is not paranoid. Thought content does not include homicidal ideation. Thought content does not include homicidal or suicidal plan.          Discussion with Family: Updated  (daughter in law) at bedside.    Discharge instructions/Information to patient and family:   See after visit summary for information provided to patient and family.      Provisions for Follow-Up Care:  See after visit summary for information related to follow-up care and any pertinent home health orders.      Mobility at time of Discharge:   Basic Mobility Inpatient Raw Score: 16  JH-HLM Goal: 5: Stand one or more mins  JH-HLM Achieved: 2: Bed activities/Dependent transfer  HLM Goal NOT achieved. Continue to encourage  mobility in post discharge setting.     Disposition:   Inpatient Psychiatry at [facility]    Planned Readmission: [facility] psych unit    Discharge Medications:  See after visit summary for reconciled discharge medications provided to patient and/or family.      **Please Note: This note may have been constructed using a voice recognition system**

## 2024-03-25 NOTE — CASE MANAGEMENT
Case Management Assessment & Discharge Planning Note    Patient name Rosalind Wise  Location S /S -01 MRN [id number]  : 1940 Date 3/25/2024       Current Admission Date: 3/22/2024  Current Admission Diagnosis:Intentional overdose (HCC)   Patient Active Problem List    Diagnosis Date Noted    Intentional overdose (HCC) 2024    Atrial fibrillation with RVR (HCC) 2024    Paroxysmal atrial fibrillation (HCC) 2023    PAC (premature atrial contraction) 2023    Cervical radiculopathy     Spinal stenosis of lumbar region with neurogenic claudication     Sciatica of left side 2021    BMI 38.0-38.9,adult 07/10/2020    Status post right frontotemporal replacement cranioplasty 2020    Prediabetes 2020    Exertional shortness of breath 10/26/2018    Gastroesophageal reflux disease without esophagitis 2018    Hyperlipemia 11/15/2017    Hyperuricemia 2017    Pseudogout of left wrist 2017    Nephrolithiasis 2017    CKD (chronic kidney disease), stage III ([facility]) 2017    Benign essential hypertension 2016    Chronic diastolic (congestive) heart failure (HCC) 2016    Hypothyroidism 2016    Diverticulitis of colon 2016    WENDI (obstructive sleep apnea) 2016    Hallux abductovalgus with bunions, unspecified laterality 2013    Chronic gout without tophus 2013    Arthropathy of knee 2012    Hiatal hernia 2012      LOS (days): 3  Geometric Mean LOS (GMLOS) (days): 2.5  Days to GMLOS:-0.3     OBJECTIVE:    Risk of Unplanned Readmission Score: 10.69         Current admission status: Inpatient       Preferred Pharmacy:   [facility] (Now [facility]) [facility] [address]  [address] [facility] [address]  Phone: [phone] Fax: [phone]    [facility] - [facility] [address]   [address]  [facility] [address]  Phone: [phone] Fax: [phone]    Primary Care Provider: Suzanna Lopez MD    Primary Insurance: MEDICARE  Secondary Insurance: AARP    ASSESSMENT:  Active Health Care Proxies       Everett Wise Health Care Agent - Son   Primary Phone: [phone] (Mobile)                                Patient Information  Admitted from:: Home  Mental Status: Alert  During Assessment patient was accompanied by: Not accompanied during assessment  Assessment information provided by:: Patient, Son  Primary Caregiver: Self  Support Systems: Family members, Children, Son  What [place] do you [place] in[place]  Home entry access options. Select all that apply.: Stairs  Number of steps to enter home.: 1  Type of Current Residence: [facility]  Living Arrangements: Other (Comment) (Lives w/ son, DIL, and grandson)    Activities of Daily Living Prior to Admission  Functional Status: Independent  Completes ADLs independently?: Yes  Ambulates independently?: Yes  Does patient use assisted devices?: Yes  Assisted Devices (DME) used: Walker  Does patient currently own DME?: Yes  What DME does the patient currently own?: Walker  Does patient have a history of Outpatient Therapy (PT/OT)?: Yes  Does the patient have a history of Short-Term Rehab?: No  Does patient have a history of HHC?: No  Does patient currently have HHC?: No         Patient Information Continued  Income Source: Pension/half-way  Does patient have prescription coverage?: Yes  Does patient receive dialysis treatments?: No  Does patient have a history of substance abuse?: No  Current Status:: 201  Does patient have a history of Mental Health Diagnosis?: No         Means of Transportation  Means of Transport to [facility]:: Family transport      Social Determinants of Health (SDOH)      Flowsheet Row Most Recent Value   Housing Stability    In the last 12 months, was there a time when you were not able to pay the mortgage or rent on time? N   In the last  12 months, was there a time when you did not have a steady place to sleep or slept in a shelter (including now)? N   Transportation Needs    In the past 12 months, has lack of transportation kept you from medical appointments or from getting medications? no   In the past 12 months, has lack of transportation kept you from meetings, work, or from getting things needed for daily living? No   Food Insecurity    Within the past 12 months, you worried that your food would run out before you got the money to buy more. Never true   Within the past 12 months, the food you bought just didn't last and you didn't have money to get more. Never true   Utilities    In the past 12 months has the electric, gas, oil, or water company threatened to shut off services in your home? No            DISCHARGE DETAILS:    Discharge planning discussed with:: Patient at bedside; Son via phone  Freedom of Choice: Yes     CM contacted family/caregiver?: Yes  Were Treatment Team discharge recommendations reviewed with patient/caregiver?: Yes  Did patient/caregiver verbalize understanding of patient care needs?: Yes  Were patient/caregiver advised of the risks associated with not following Treatment Team discharge recommendations?: Yes    Contacts  Patient Contacts: Son Everett Wise  Relationship to Patient:: Family  Contact Method: Phone  Phone Number: [phone]  Reason/Outcome: Discharge Planning    Requested Home Health Care         Is the patient interested in HHC at discharge?: No    DME Referral Provided  Referral made for DME?: No    Other Referral/Resources/Interventions Provided:  Interventions: Psych Rehab  Referral Comments: Patient was evaluated by the psychiatry team today and is deemed appropriate for IP psychiatric placement due to recent overdose. Patient was resistent but ultimately agreed. She has never received psychiatric care or participated in talk therapy before. Patient signed 201. CM will begin bedsearch. CM spoke with  son Everett via phone to provide update re: same. Preference is [facility] or a nearby location, however CM advised him beds are first come/first serve and we will also explore facilities further from their address. Son verablized understanding of same. CM will continue to follow.         Treatment Team Recommendation: Inpatient Behavioral Health  Discharge Destination Plan:: Inpatient Behavioral Health

## 2024-03-25 NOTE — CONSULTS
"Consultation - Behavioral Health   Rosalind Wise 83 y.o. female MRN: [id number]  Unit/Bed#: S -01 Encounter: [id number]    Physician Requesting Consult: Xu Sarmiento MD  Reason for Consult / Principal Problem: Intentional Overdose    Assessment and Plan     Assessment/Plan   Rosalind Wise is a 83 y.o. female with history of anxiety and depressive symptoms lacking a formal diagnosis prior to this admission. Patient was admitted per EMS after suicide attempt, intentional overdose. Per report patient took \"handfuls\" of home medications including Zofran, Atarax, Benadryl, Tylenol PM, Plavix. Per reports, found by grandson, incontinent of bowel and bladder. Medical toxicology consulted, supportive care and NAC therapy. Noted a-fib RVR in ED, cardiology consulted, rate controlled on oral regimen s/p Cardizem drip.    Diagnosis: Major Depressive Disorder - Recurrent Episode    Recommended Treatment:   Patient currently does meet criteria for inpatient psychiatric hospitalization: patient is currently at potential risk of harming self or others  Patient has agreed to sign a 201  201 Signed - spoke with son Everett regarding inpatient 201 treatment for Rosalind  Continue 1:1 close observation  Case Management following for inpatient placement  At this time, defer new medication recommendations until patient is accepted to inpatient psychiatric unit as patient has demonstrated low potential for aggression  Safety plan discussed with patient.  Psychiatry will continue to follow.   If contacting or consulting after hours, please call or TigerText the on-call team ([facility]: [phone]) with any questions or concerns.    Diagnoses were discussed with the patient.  Available treatment options were discussed with the patient.  Prior records were reviewed in [facility].  The assessment and plan was discussed with the primary team.     Risks, benefits and possible side effects of Medications:   No medications given at this " "time.        History of Present Illness   Chief Complaint: Depressive symptoms, recent suicide attempt    Rosalind Wise is a 83 y.o. female with medical history HTN, atrial fibrillation, CKD stage III, anxiety - previously on Cymbalta, hemorrhagic stroke 2020 s/p craniectomy and cranioplasty, CHF, admitted for intentional overdose. Prior records were reviewed.     Denies previous suicide attempts, denies prior self-harm. Denies current medication therapy for anxiety, depression, other psychiatric disorders.    Symptoms include: depression worse, difficulty sleeping, feeling depressed, increased irritability, relationship difficulties, tearfulness. Onset of symptoms was sudden starting 5 years ago when patient lost her spouse with gradually worsening course since that time. Psychosocial Stressors: family, social, and loss of loved ones.     Today, patient alert and oriented. Understanding of why she remains hospitalized. States she regrets the suicide attempt, \"believed she would die right away\" and that her family would be \"happy\" and she would \"no longer be a burden\". Reports she is Nondenominational and asked for forgiveness prior to intentional overdose.  She endorses grief due to loss of spouse and friends, social isolation, feeling like a burden, and loss of independence. Depressive symptoms have been present, waxing and waning per daughter in law over the past several years. When patient's spouse passed she became anxious about living alone and at that time moved in with son and his family. Daughter-in-law reports since that time Rosalind has had \"bouts of depression\" during which she eats minimally, isolates herself to her room, sleeps poorly. Does state that day prior to suicide attempt patient attended a  service for a close friend. Patient denies suicidal ideation at this time. Denies HI/VH/AH. Agreeable today for further inpatient psychiatric treatment and signed 201. Son updated.      Psychiatric Review " "of Systems and PHx     Problems with sleep: yes, decreased  Appetite changes: yes, decreased  Weight changes: no  Low energy/anergy: yes  Low interest/pleasure/anhedonia: yes  Somatic symptoms: no  Anxiety/panic: yes  [place]: no  Guilt/hopeless: yes, feeling of \"burdening\" her family  Self injurious behavior/risky behavior: yes    Historical Information   Prior psychiatric diagnoses: Anxiety  Inpatient hospitalizations: patient denies  Suicide attempts: patient denies  Self-harm behaviors: patient denies  Outpatient treatment: patient denies  Psychiatric medication trial: Cymbalta tried, reports drowsiness caused her to discontinue    Substance Abuse History:    Social History     Tobacco Use    Smoking status: Never    Smokeless tobacco: Never   Vaping Use    Vaping status: Never Used   Substance Use Topics    Alcohol use: Yes     Comment: Only on special occasions    Drug use: Never      Patient denies use of tobacco, alcohol, or illicit drugs.   I have assessed this patient for substance use within the past 12 months    Family Psychiatric History:   Patient denies any known family history of psychiatric illness, suicide attempt, or substance abuse    Social History  Marital history:   Children: yes  Living arrangement: Lives in a home with son, daughter-in-law, grandson.  Functioning Relationships: strained relationship with child, feels isolated  Education:  unknown  Occupational History: retired  Other Pertinent History: None    Traumatic History:   Abuse: denies  Other Traumatic Events: none reported    Past Medical History:   Diagnosis Date    Anxiety     Arthritis     joints    Bunion     Cataract     Disease of thyroid gland     Eczema     GERD (gastroesophageal reflux disease)     Gout     Heart failure (HCC)     Hepatitis     Hiatal hernia     Hypertension     Onychomycosis     last assessed: 4/29/16    Orthopnea     resolved: 1/8/16    Pseudogout of left wrist     Sleep apnea     wears CPAP    " Stroke (HCC)        Medical Review of Systems and MSE   tion and Medical ROS  Medical Review Of Systems:  Review of Systems - Negative except as noted in HPI    Meds/Allergies   all current active meds have been reviewed  No Known Allergies    Objective   Vital signs in last 24 hours:  Temp:  [96.8 °F (36 °C)-98.1 °F (36.7 °C)] 97.6 °F (36.4 °C)  HR:  [] 118  Resp:  [16-17] 16  BP: (135-148)/(70-96) 143/76    Risk of Harm to Self:   The following ratings are based on review of the hospital stay progress, assessment at the time of the interview, and review of records  Demographic risk factors include: ,  status, elderly (75 or older)   Historical Risk Factors include: chronic depressive symptoms, history of anxiety  Current Specific Risk Factors include: recent serious suicide attempt, chronic depressive symptoms, chronic anxiety symptoms, recent losses (spouse 5 years prior, most recently close friend last week), social isolation, ongoing depressive symptoms  Weapons/Firearms:  firearm present in the home, secured . The following steps have been taken to ensure weapons are properly secured: locked, secured, no access  Based on today's assessment, Rosalind presents the following risk of harm to self: high    Risk of Harm to Others:  The following ratings are based on review of the hospital stay progress, assessment at the time of the interview, and review of records  Demographic Risk Factors include: none.  Historical Risk Factors include: none.  Current Specific Risk Factors include: multiple stressors  Protective Factors: no current homicidal ideation, willing to continue psychiatric treatment, no prior history of violence, stable living environment, supportive family, connection to own children  Weapons/Firearms:  firearm in home, secured . The following steps have been taken to ensure weapons are properly secured: locked, secured, no access  Based on today's assessmentRosalind presents the  following risk of harm to others: none    Mental Status Exam:  Appearance:  alert, good eye contact, appears stated age, and appropriate grooming and hygiene   Behavior:  calm, cooperative, and laying in bed   Motor: no abnormal movements   Speech:  spontaneous, clear, soft, and coherent   Mood:  depressed   Affect:  depressed and tearful at times   Thought Process:  Organized, logical, goal-directed   Thought Content: no verbalized delusions or overt paranoia   Perceptual disturbances: no reported hallucinations and does not appear to be responding to internal stimuli at this time   Risk Potential: No active or passive suicidal or homicidal ideation was verbalized during interview   Cognition: oriented to person, place, time, and situation, memory grossly intact, appears to be of average intelligence, age-appropriate attention span and concentration, and cognition not formally tested   Insight:  Fair   Judgment: Improving     Laboratory results:  I have personally reviewed all pertinent laboratory/tests results.        The following portions of the patient's history were reviewed and updated as appropriate: allergies, current medications, past family history, past medical history, past social history, past surgical history, and problem list.    This note was created using dictation system. There may be translation, syntax,  or grammatical errors. If you have any questions, please contact the dictating provider.    HIEU Morales

## 2024-03-25 NOTE — ASSESSMENT & PLAN NOTE
Patient ingested unknown amounts of Zofran, Atarax, Plavix, Benadryl, Tylenol cold and flu tablets.  Per ED she reports she ingested medications around 10 AM.  In the ED Case was discussed with Dr. Alex with toxicology and recommending supportive care and NAC therapy.  Patient received acetylcysteine in the ED and was given 500 mL bolus of normal saline.  Patient with signs of anticholinergic effects  Patient currently on one-to-one for suicidal ideations.  She denies any history of SI.  She reports feeling depressed since the passing of her .  Denies any auditory or visual hallucinations.  Denies any homicidal ideations.  States she feels better today and regrets what she has done    Plan  Appreciate psychiatric recommendations  Normal diet therefore dc iv fluids  Neurochecks every 4 hours  Urinary retention protocol in place

## 2024-03-25 NOTE — ASSESSMENT & PLAN NOTE
Lab Results   Component Value Date    EGFR 60 03/26/2024    EGFR 54 03/25/2024    EGFR 45 03/24/2024    CREATININE 0.88 03/26/2024    CREATININE 0.96 03/25/2024    CREATININE 1.11 03/24/2024   Stable

## 2024-03-25 NOTE — ASSESSMENT & PLAN NOTE
Wt Readings from Last 3 Encounters:   03/25/24 71.4 kg (157 lb 6.5 oz)   02/27/24 70.3 kg (155 lb)   12/18/23 70.3 kg (155 lb)   Appears euvolemic at this time  Echo from October 2023 showing LVEF of 60% with normal systolic function and diastolic dysfunction.  AV with reduced mobility, moderate regurgitation, and mild stenosis.  Trace TV regurgitation moderate MV regurgitation noted  Continue metoprolol and spironolactone  Potassium repleted  Monitor intake and output  Daily weights I/O's  Increase metoprolol to 25 mg twice daily from today

## 2024-03-26 ENCOUNTER — HOSPITAL ENCOUNTER (INPATIENT)
Facility: HOSPITAL | Age: 84
LOS: 3 days | Discharge: HOME | End: 2024-03-29
Attending: PSYCHIATRY & NEUROLOGY | Admitting: PSYCHIATRY & NEUROLOGY
Payer: MEDICARE

## 2024-03-26 VITALS
DIASTOLIC BLOOD PRESSURE: 70 MMHG | TEMPERATURE: 98.6 F | RESPIRATION RATE: 16 BRPM | SYSTOLIC BLOOD PRESSURE: 121 MMHG | BODY MASS INDEX: 29.58 KG/M2 | WEIGHT: 156.53 LBS | HEART RATE: 106 BPM | OXYGEN SATURATION: 89 %

## 2024-03-26 DIAGNOSIS — K21.9 GASTROESOPHAGEAL REFLUX DISEASE WITHOUT ESOPHAGITIS: ICD-10-CM

## 2024-03-26 DIAGNOSIS — Z00.8 MEDICAL CLEARANCE FOR PSYCHIATRIC ADMISSION: ICD-10-CM

## 2024-03-26 DIAGNOSIS — I50.32 CHRONIC DIASTOLIC (CONGESTIVE) HEART FAILURE (HCC): ICD-10-CM

## 2024-03-26 DIAGNOSIS — N18.30 STAGE 3 CHRONIC KIDNEY DISEASE, UNSPECIFIED WHETHER STAGE 3A OR 3B CKD (HCC): ICD-10-CM

## 2024-03-26 DIAGNOSIS — R06.02 EXERTIONAL SHORTNESS OF BREATH: ICD-10-CM

## 2024-03-26 DIAGNOSIS — F32.A DEPRESSION: Primary | ICD-10-CM

## 2024-03-26 DIAGNOSIS — E03.9 HYPOTHYROIDISM, UNSPECIFIED TYPE: ICD-10-CM

## 2024-03-26 DIAGNOSIS — N20.0 NEPHROLITHIASIS: ICD-10-CM

## 2024-03-26 DIAGNOSIS — E79.0 HYPERURICEMIA: ICD-10-CM

## 2024-03-26 DIAGNOSIS — I49.1 PAC (PREMATURE ATRIAL CONTRACTION): ICD-10-CM

## 2024-03-26 DIAGNOSIS — M1A.0720 IDIOPATHIC CHRONIC GOUT OF LEFT ANKLE WITHOUT TOPHUS: ICD-10-CM

## 2024-03-26 DIAGNOSIS — G47.33 OSA (OBSTRUCTIVE SLEEP APNEA): ICD-10-CM

## 2024-03-26 DIAGNOSIS — I48.91 A-FIB (HCC): ICD-10-CM

## 2024-03-26 DIAGNOSIS — I10 BENIGN ESSENTIAL HYPERTENSION: ICD-10-CM

## 2024-03-26 DIAGNOSIS — E78.5 HYPERLIPIDEMIA, UNSPECIFIED HYPERLIPIDEMIA TYPE: ICD-10-CM

## 2024-03-26 LAB
ANION GAP SERPL CALCULATED.3IONS-SCNC: 9 MMOL/L (ref 4–13)
BACTERIA UR QL AUTO: ABNORMAL /HPF
BILIRUB UR QL STRIP: NEGATIVE
BUN SERPL-MCNC: 24 MG/DL (ref 5–25)
CALCIUM SERPL-MCNC: 9 MG/DL (ref 8.4–10.2)
CHLORIDE SERPL-SCNC: 103 MMOL/L (ref 96–108)
CLARITY UR: CLEAR
CO2 SERPL-SCNC: 28 MMOL/L (ref 21–32)
COLOR UR: ABNORMAL
CREAT SERPL-MCNC: 0.88 MG/DL (ref 0.6–1.3)
GFR SERPL CREATININE-BSD FRML MDRD: 60 ML/MIN/1.73SQ M
GLUCOSE SERPL-MCNC: 99 MG/DL (ref 65–140)
GLUCOSE UR STRIP-MCNC: NEGATIVE MG/DL
HGB UR QL STRIP.AUTO: NEGATIVE
HYALINE CASTS #/AREA URNS LPF: ABNORMAL /LPF
KETONES UR STRIP-MCNC: NEGATIVE MG/DL
LEUKOCYTE ESTERASE UR QL STRIP: ABNORMAL
NITRITE UR QL STRIP: NEGATIVE
NON-SQ EPI CELLS URNS QL MICRO: ABNORMAL /HPF
PH UR STRIP.AUTO: 5 [PH]
POTASSIUM SERPL-SCNC: 3.6 MMOL/L (ref 3.5–5.3)
PROT UR STRIP-MCNC: NEGATIVE MG/DL
RBC #/AREA URNS AUTO: ABNORMAL /HPF
SODIUM SERPL-SCNC: 140 MMOL/L (ref 135–147)
SP GR UR STRIP.AUTO: 1.01 (ref 1–1.03)
UROBILINOGEN UR STRIP-ACNC: <2 MG/DL
WBC #/AREA URNS AUTO: ABNORMAL /HPF

## 2024-03-26 PROCEDURE — 81001 URINALYSIS AUTO W/SCOPE: CPT

## 2024-03-26 PROCEDURE — 99239 HOSP IP/OBS DSCHRG MGMT >30: CPT | Performed by: INTERNAL MEDICINE

## 2024-03-26 PROCEDURE — 80048 BASIC METABOLIC PNL TOTAL CA: CPT

## 2024-03-26 RX ORDER — LORAZEPAM 2 MG/ML
1 INJECTION INTRAMUSCULAR
Status: DISCONTINUED | OUTPATIENT
Start: 2024-03-26 | End: 2024-03-29 | Stop reason: HOSPADM

## 2024-03-26 RX ORDER — HYDROXYZINE 50 MG/1
50 TABLET, FILM COATED ORAL
Status: CANCELLED | OUTPATIENT
Start: 2024-03-26

## 2024-03-26 RX ORDER — SPIRONOLACTONE 25 MG/1
12.5 TABLET ORAL DAILY
Status: DISCONTINUED | OUTPATIENT
Start: 2024-03-27 | End: 2024-03-27

## 2024-03-26 RX ORDER — OLANZAPINE 5 MG/1
5 TABLET ORAL
Status: DISCONTINUED | OUTPATIENT
Start: 2024-03-26 | End: 2024-03-29 | Stop reason: HOSPADM

## 2024-03-26 RX ORDER — LANOLIN ALCOHOL/MO/W.PET/CERES
3 CREAM (GRAM) TOPICAL
Status: DISCONTINUED | OUTPATIENT
Start: 2024-03-26 | End: 2024-03-29 | Stop reason: HOSPADM

## 2024-03-26 RX ORDER — ACETAMINOPHEN 325 MG/1
975 TABLET ORAL EVERY 6 HOURS PRN
Status: DISCONTINUED | OUTPATIENT
Start: 2024-03-26 | End: 2024-03-29 | Stop reason: HOSPADM

## 2024-03-26 RX ORDER — OLANZAPINE 2.5 MG/1
5 TABLET, FILM COATED ORAL
Status: CANCELLED | OUTPATIENT
Start: 2024-03-26

## 2024-03-26 RX ORDER — BISACODYL 10 MG
10 SUPPOSITORY, RECTAL RECTAL DAILY PRN
Status: DISCONTINUED | OUTPATIENT
Start: 2024-03-26 | End: 2024-03-28

## 2024-03-26 RX ORDER — LOSARTAN POTASSIUM 25 MG/1
25 TABLET ORAL DAILY
Status: DISCONTINUED | OUTPATIENT
Start: 2024-03-27 | End: 2024-03-29 | Stop reason: HOSPADM

## 2024-03-26 RX ORDER — SPIRONOLACTONE 25 MG/1
12.5 TABLET ORAL DAILY
Status: CANCELLED | OUTPATIENT
Start: 2024-03-27

## 2024-03-26 RX ORDER — HYDROXYZINE HYDROCHLORIDE 25 MG/1
25 TABLET, FILM COATED ORAL
Status: CANCELLED | OUTPATIENT
Start: 2024-03-26

## 2024-03-26 RX ORDER — ACETAMINOPHEN 325 MG/1
650 TABLET ORAL EVERY 4 HOURS PRN
Status: CANCELLED | OUTPATIENT
Start: 2024-03-26

## 2024-03-26 RX ORDER — TRAZODONE HYDROCHLORIDE 50 MG/1
50 TABLET ORAL
Status: DISCONTINUED | OUTPATIENT
Start: 2024-03-26 | End: 2024-03-29 | Stop reason: HOSPADM

## 2024-03-26 RX ORDER — PANTOPRAZOLE SODIUM 20 MG/1
20 TABLET, DELAYED RELEASE ORAL DAILY
Status: CANCELLED | OUTPATIENT
Start: 2024-03-27

## 2024-03-26 RX ORDER — OLANZAPINE 10 MG/2ML
5 INJECTION, POWDER, FOR SOLUTION INTRAMUSCULAR
Status: DISCONTINUED | OUTPATIENT
Start: 2024-03-26 | End: 2024-03-29 | Stop reason: HOSPADM

## 2024-03-26 RX ORDER — PANTOPRAZOLE SODIUM 20 MG/1
20 TABLET, DELAYED RELEASE ORAL DAILY
Status: DISCONTINUED | OUTPATIENT
Start: 2024-03-27 | End: 2024-03-29 | Stop reason: HOSPADM

## 2024-03-26 RX ORDER — HYDROXYZINE HYDROCHLORIDE 25 MG/1
25 TABLET, FILM COATED ORAL
Status: DISCONTINUED | OUTPATIENT
Start: 2024-03-26 | End: 2024-03-29 | Stop reason: HOSPADM

## 2024-03-26 RX ORDER — PROPRANOLOL HYDROCHLORIDE 10 MG/1
5 TABLET ORAL EVERY 8 HOURS PRN
Status: CANCELLED | OUTPATIENT
Start: 2024-03-26

## 2024-03-26 RX ORDER — TRAZODONE HYDROCHLORIDE 50 MG/1
50 TABLET ORAL
Status: CANCELLED | OUTPATIENT
Start: 2024-03-26

## 2024-03-26 RX ORDER — ACETAMINOPHEN 325 MG/1
650 TABLET ORAL EVERY 4 HOURS PRN
Status: DISCONTINUED | OUTPATIENT
Start: 2024-03-26 | End: 2024-03-29 | Stop reason: HOSPADM

## 2024-03-26 RX ORDER — OLANZAPINE 10 MG/2ML
5 INJECTION, POWDER, FOR SOLUTION INTRAMUSCULAR
Status: CANCELLED | OUTPATIENT
Start: 2024-03-26

## 2024-03-26 RX ORDER — HYDROXYZINE 50 MG/1
50 TABLET, FILM COATED ORAL
Status: DISCONTINUED | OUTPATIENT
Start: 2024-03-26 | End: 2024-03-29 | Stop reason: HOSPADM

## 2024-03-26 RX ORDER — LOSARTAN POTASSIUM 25 MG/1
25 TABLET ORAL DAILY
Status: CANCELLED | OUTPATIENT
Start: 2024-03-27

## 2024-03-26 RX ORDER — OLANZAPINE 2.5 MG/1
2.5 TABLET, FILM COATED ORAL
Status: CANCELLED | OUTPATIENT
Start: 2024-03-26

## 2024-03-26 RX ORDER — LEVOTHYROXINE SODIUM 88 UG/1
88 TABLET ORAL DAILY
Status: CANCELLED | OUTPATIENT
Start: 2024-03-27

## 2024-03-26 RX ORDER — AMOXICILLIN 250 MG
1 CAPSULE ORAL DAILY PRN
Status: DISCONTINUED | OUTPATIENT
Start: 2024-03-26 | End: 2024-03-29 | Stop reason: HOSPADM

## 2024-03-26 RX ORDER — BENZTROPINE MESYLATE 0.5 MG/1
0.5 TABLET ORAL
Status: CANCELLED | OUTPATIENT
Start: 2024-03-26

## 2024-03-26 RX ORDER — LORAZEPAM 1 MG/1
1 TABLET ORAL
Status: CANCELLED | OUTPATIENT
Start: 2024-03-26

## 2024-03-26 RX ORDER — MAGNESIUM HYDROXIDE/ALUMINUM HYDROXICE/SIMETHICONE 120; 1200; 1200 MG/30ML; MG/30ML; MG/30ML
30 SUSPENSION ORAL EVERY 4 HOURS PRN
Status: CANCELLED | OUTPATIENT
Start: 2024-03-26

## 2024-03-26 RX ORDER — LORAZEPAM 2 MG/ML
1 INJECTION INTRAMUSCULAR
Status: CANCELLED | OUTPATIENT
Start: 2024-03-26

## 2024-03-26 RX ORDER — BUMETANIDE 1 MG/1
1 TABLET ORAL DAILY
Status: CANCELLED | OUTPATIENT
Start: 2024-03-27

## 2024-03-26 RX ORDER — AMOXICILLIN 250 MG
1 CAPSULE ORAL DAILY
Status: DISCONTINUED | OUTPATIENT
Start: 2024-03-27 | End: 2024-03-29 | Stop reason: HOSPADM

## 2024-03-26 RX ORDER — POLYETHYLENE GLYCOL 3350 17 G/17G
17 POWDER, FOR SOLUTION ORAL DAILY PRN
Status: CANCELLED | OUTPATIENT
Start: 2024-03-26

## 2024-03-26 RX ORDER — POLYETHYLENE GLYCOL 3350 17 G/17G
17 POWDER, FOR SOLUTION ORAL DAILY PRN
Status: DISCONTINUED | OUTPATIENT
Start: 2024-03-26 | End: 2024-03-28

## 2024-03-26 RX ORDER — BENZTROPINE MESYLATE 1 MG/ML
1 INJECTION INTRAMUSCULAR; INTRAVENOUS
Status: DISCONTINUED | OUTPATIENT
Start: 2024-03-26 | End: 2024-03-29 | Stop reason: HOSPADM

## 2024-03-26 RX ORDER — AMOXICILLIN 250 MG
1 CAPSULE ORAL DAILY
Status: CANCELLED | OUTPATIENT
Start: 2024-03-27

## 2024-03-26 RX ORDER — BENZTROPINE MESYLATE 1 MG/ML
1 INJECTION INTRAMUSCULAR; INTRAVENOUS
Status: CANCELLED | OUTPATIENT
Start: 2024-03-26

## 2024-03-26 RX ORDER — ATORVASTATIN CALCIUM 40 MG/1
40 TABLET, FILM COATED ORAL EVERY EVENING
Status: DISCONTINUED | OUTPATIENT
Start: 2024-03-27 | End: 2024-03-29 | Stop reason: HOSPADM

## 2024-03-26 RX ORDER — LANOLIN ALCOHOL/MO/W.PET/CERES
3 CREAM (GRAM) TOPICAL
Status: CANCELLED | OUTPATIENT
Start: 2024-03-26

## 2024-03-26 RX ORDER — BISACODYL 10 MG
10 SUPPOSITORY, RECTAL RECTAL DAILY PRN
Status: CANCELLED | OUTPATIENT
Start: 2024-03-26

## 2024-03-26 RX ORDER — ACETAMINOPHEN 325 MG/1
975 TABLET ORAL EVERY 6 HOURS PRN
Status: CANCELLED | OUTPATIENT
Start: 2024-03-26

## 2024-03-26 RX ORDER — LEVOTHYROXINE SODIUM 88 UG/1
88 TABLET ORAL DAILY
Status: DISCONTINUED | OUTPATIENT
Start: 2024-03-27 | End: 2024-03-29 | Stop reason: HOSPADM

## 2024-03-26 RX ORDER — SIMETHICONE 80 MG
160 TABLET,CHEWABLE ORAL
Status: DISCONTINUED | OUTPATIENT
Start: 2024-03-26 | End: 2024-03-26

## 2024-03-26 RX ORDER — BENZTROPINE MESYLATE 0.5 MG/1
0.5 TABLET ORAL
Status: DISCONTINUED | OUTPATIENT
Start: 2024-03-26 | End: 2024-03-29 | Stop reason: HOSPADM

## 2024-03-26 RX ORDER — PROPRANOLOL HYDROCHLORIDE 10 MG/1
5 TABLET ORAL EVERY 8 HOURS PRN
Status: DISCONTINUED | OUTPATIENT
Start: 2024-03-26 | End: 2024-03-29 | Stop reason: HOSPADM

## 2024-03-26 RX ORDER — BUMETANIDE 1 MG/1
1 TABLET ORAL DAILY
Status: DISCONTINUED | OUTPATIENT
Start: 2024-03-27 | End: 2024-03-27

## 2024-03-26 RX ORDER — MAGNESIUM HYDROXIDE/ALUMINUM HYDROXICE/SIMETHICONE 120; 1200; 1200 MG/30ML; MG/30ML; MG/30ML
30 SUSPENSION ORAL EVERY 4 HOURS PRN
Status: DISCONTINUED | OUTPATIENT
Start: 2024-03-26 | End: 2024-03-29 | Stop reason: HOSPADM

## 2024-03-26 RX ORDER — LORAZEPAM 1 MG/1
1 TABLET ORAL
Status: DISCONTINUED | OUTPATIENT
Start: 2024-03-26 | End: 2024-03-29 | Stop reason: HOSPADM

## 2024-03-26 RX ORDER — AMOXICILLIN 250 MG
1 CAPSULE ORAL DAILY PRN
Status: CANCELLED | OUTPATIENT
Start: 2024-03-26

## 2024-03-26 RX ORDER — ATORVASTATIN CALCIUM 40 MG/1
40 TABLET, FILM COATED ORAL EVERY EVENING
Status: CANCELLED | OUTPATIENT
Start: 2024-03-26

## 2024-03-26 RX ORDER — OLANZAPINE 2.5 MG/1
2.5 TABLET, FILM COATED ORAL
Status: DISCONTINUED | OUTPATIENT
Start: 2024-03-26 | End: 2024-03-29 | Stop reason: HOSPADM

## 2024-03-26 RX ADMIN — APIXABAN 2.5 MG: 2.5 TABLET, FILM COATED ORAL at 08:15

## 2024-03-26 RX ADMIN — DICLOFENAC SODIUM TOPICAL GEL, 1% 2 G: 10 GEL TOPICAL at 16:54

## 2024-03-26 RX ADMIN — APIXABAN 2.5 MG: 2.5 TABLET, FILM COATED ORAL at 16:54

## 2024-03-26 RX ADMIN — LOSARTAN POTASSIUM 25 MG: 25 TABLET, FILM COATED ORAL at 08:15

## 2024-03-26 RX ADMIN — DICLOFENAC SODIUM TOPICAL GEL, 1% 2 G: 10 GEL TOPICAL at 08:16

## 2024-03-26 RX ADMIN — METOPROLOL TARTRATE 25 MG: 25 TABLET, FILM COATED ORAL at 22:18

## 2024-03-26 RX ADMIN — SENNOSIDES, DOCUSATE SODIUM 1 TABLET: 8.6; 5 TABLET ORAL at 08:15

## 2024-03-26 RX ADMIN — ATORVASTATIN CALCIUM 40 MG: 40 TABLET, FILM COATED ORAL at 16:54

## 2024-03-26 RX ADMIN — LEVOTHYROXINE SODIUM 88 MCG: 88 TABLET ORAL at 08:15

## 2024-03-26 RX ADMIN — SPIRONOLACTONE 12.5 MG: 25 TABLET ORAL at 08:15

## 2024-03-26 RX ADMIN — PANTOPRAZOLE SODIUM 20 MG: 20 TABLET, DELAYED RELEASE ORAL at 08:15

## 2024-03-26 RX ADMIN — BUMETANIDE 1 MG: 1 TABLET ORAL at 08:15

## 2024-03-26 RX ADMIN — MELATONIN TAB 3 MG 3 MG: 3 TAB at 22:18

## 2024-03-26 RX ADMIN — Medication 12.5 MG: at 09:40

## 2024-03-26 NOTE — PLAN OF CARE
Problem: PAIN - ADULT  Goal: Verbalizes/displays adequate comfort level or baseline comfort level  Description: Interventions:  - Encourage patient to monitor pain and request assistance  - Assess pain using appropriate pain scale  - Administer analgesics based on type and severity of pain and evaluate response  - Implement non-pharmacological measures as appropriate and evaluate response  - Consider cultural and social influences on pain and pain management  - Notify physician/advanced practitioner if interventions unsuccessful or patient reports new pain  Outcome: Progressing     Problem: SAFETY ADULT  Goal: Patient will remain free of falls  Description: INTERVENTIONS:  - Educate patient/family on patient safety including physical limitations  - Instruct patient to call for assistance with activity   - Consult OT/PT to assist with strengthening/mobility   - Keep Call bell within reach  - Keep bed low and locked with side rails adjusted as appropriate  - Keep care items and personal belongings within reach  - Initiate and maintain comfort rounds  - Make Fall Risk Sign visible to staff  - Apply yellow socks and bracelet for high fall risk patients  - Consider moving patient to room near nurses station  Outcome: Progressing  Goal: Maintain or return to baseline ADL function  Description: INTERVENTIONS:  -  Assess patient's ability to carry out ADLs; assess patient's baseline for ADL function and identify physical deficits which impact ability to perform ADLs (bathing, care of mouth/teeth, toileting, grooming, dressing, etc.)  - Assess/evaluate cause of self-care deficits   - Assess range of motion  - Assess patient's mobility; develop plan if impaired  - Assess patient's need for assistive devices and provide as appropriate  - Encourage maximum independence but intervene and supervise when necessary  - Involve family in performance of ADLs  - Assess for home care needs following discharge   - Consider OT consult  to assist with ADL evaluation and planning for discharge  - Provide patient education as appropriate  Outcome: Progressing  Goal: Maintains/Returns to pre admission functional level  Description: INTERVENTIONS:  - Perform AM-PAC 6 Click Basic Mobility/ Daily Activity assessment daily.  - Set and communicate daily mobility goal to care team and patient/family/caregiver.   - Collaborate with rehabilitation services on mobility goals if consulted  - Perform Range of Motion   - Reposition patient   - Dangle patient  - Stand patient   - Ambulate patient   - Out of bed to chair   - Out of bed for meals   - Out of bed for toileting  - Record patient progress and toleration of activity level   Outcome: Progressing     Problem: DISCHARGE PLANNING  Goal: Discharge to home or other facility with appropriate resources  Description: INTERVENTIONS:  - Identify barriers to discharge w/patient and caregiver  - Arrange for needed discharge resources and transportation as appropriate  - Identify discharge learning needs (meds, wound care, etc.)  - Arrange for interpretive services to assist at discharge as needed  - Refer to Case Management Department for coordinating discharge planning if the patient needs post-hospital services based on physician/advanced practitioner order or complex needs related to functional status, cognitive ability, or social support system  Outcome: Progressing     Problem: Potential for Falls  Goal: Patient will remain free of falls  Description: INTERVENTIONS:  - Educate patient/family on patient safety including physical limitations  - Instruct patient to call for assistance with activity   - Consult OT/PT to assist with strengthening/mobility   - Keep Call bell within reach  - Keep bed low and locked with side rails adjusted as appropriate  - Keep care items and personal belongings within reach  - Initiate and maintain comfort rounds  - Make Fall Risk Sign visible to staff  - Apply yellow socks and  bracelet for high fall risk patients  - Consider moving patient to room near nurses station  Outcome: Progressing

## 2024-03-26 NOTE — CASE MANAGEMENT
Case Management Discharge Planning Note    Patient name Rosalind Wise  Location S /S -01 MRN [id number]  : 1940 Date 3/26/2024       Current Admission Date: 3/22/2024  Current Admission Diagnosis:Intentional overdose (HCC)   Patient Active Problem List    Diagnosis Date Noted    Intentional overdose (HCC) 2024    Atrial fibrillation with RVR (HCC) 2024    Paroxysmal atrial fibrillation (HCC) 2023    PAC (premature atrial contraction) 2023    Cervical radiculopathy     Spinal stenosis of lumbar region with neurogenic claudication     Sciatica of left side 2021    BMI 38.0-38.9,adult 07/10/2020    Status post right frontotemporal replacement cranioplasty 2020    Prediabetes 2020    Exertional shortness of breath 10/26/2018    Gastroesophageal reflux disease without esophagitis 2018    Hyperlipemia 11/15/2017    Hyperuricemia 2017    Pseudogout of left wrist 2017    Nephrolithiasis 2017    CKD (chronic kidney disease), stage III ([facility]) 2017    Benign essential hypertension 2016    Chronic diastolic (congestive) heart failure (HCC) 2016    Hypothyroidism 2016    Diverticulitis of colon 2016    WENDI (obstructive sleep apnea) 2016    Hallux abductovalgus with bunions, unspecified laterality 2013    Chronic gout without tophus 2013    Arthropathy of knee 2012    Hiatal hernia 2012      LOS (days): 4  Geometric Mean LOS (GMLOS) (days): 2.5  Days to GMLOS:-1.2     OBJECTIVE:  Risk of Unplanned Readmission Score: 10.73         Current admission status: Inpatient   Preferred Pharmacy:   [facility] (Now [facility]) - [facility] [address] at the [facility]  [address] [facility] [facility] [address]  Phone: [phone] Fax: [phone]    [facility] - [place], [place] - [address]  [address] [facility] [facility]  [address]  Phone: [phone] Fax: [phone]    Primary Care Provider: Suzanna Lopez MD    Primary Insurance: MEDICARE  Secondary Insurance: AARP    DISCHARGE DETAILS:    Discharge planning discussed with:: patient, son, [facility]  [place] of Choice: Yes  Comments - Freedom of Choice: CC notified that patient is cleared to dc to behavioral health unit. CC confirmed that patient will have a bed today at the OA unit at [facility]. CC confirmed that bed would be available at 7:15pm tonight. CC informed  about arrangements and CM updated patient and family. CC contacted [facility] and organized a 7:15pm BLS transport. Original 302 and BLS transport form provided.  CM contacted family/caregiver?: Yes  Were Treatment Team discharge recommendations reviewed with patient/caregiver?: Yes  Did patient/caregiver verbalize understanding of patient care needs?: Yes  Were patient/caregiver advised of the risks associated with not following Treatment Team discharge recommendations?: Yes    Contacts  Patient Contacts: Bennie Wise  Relationship to Patient:: Family  Contact Method: Phone  Phone Number: [phone]  Reason/Outcome: Discharge Planning    Requested Home Health Care         Is the patient interested in HHC at discharge?: No    DME Referral Provided  Referral made for DME?: No    Other Referral/Resources/Interventions Provided:  Referral Comments: referral sent to [facility]         Treatment Team Recommendation: Inpatient Behavioral Health  Discharge Destination Plan:: Inpatient Behavioral Health  Transport at Discharge : [facility]  Dispatcher Contacted: Yes  Number/Name of Dispatcher: roundtrip     ETA of Transport (Date): 03/26/24  ETA of Transport (Time): 1915

## 2024-03-26 NOTE — CASE MANAGEMENT
Case Management Progress Note    Patient name Rosalind Wise  Location S /S -01 MRN [id number]  : 1940 Date 3/26/2024       LOS (days): 4  Geometric Mean LOS (GMLOS) (days): 2.5  Days to GMLOS:-1.2        OBJECTIVE:        Current admission status: Inpatient  Preferred Pharmacy:   [facility] (Now [facility]) - [place] [id number] [facility]  [address] [id number]  Phone: [phone] Fax: [phone]    [facility] - [facility] [address]  [address] [id number]  Phone: [phone] Fax: [phone]    Primary Care Provider: Suzanna Lopez MD    Primary Insurance: MEDICARE  Secondary Insurance: AARP    PROGRESS NOTE:      Update given to booker Floyd regarding patient's transfer to [facility] (IP psych) today -- Transport has been arranged for 7:15pm this evening. All informed and in agreement with same.     No IMM needed as patient will be transferring to another acute care location.     No further CM needs indicated at this time. CM will remain available.

## 2024-03-27 PROBLEM — Z00.8 MEDICAL CLEARANCE FOR PSYCHIATRIC ADMISSION: Status: ACTIVE | Noted: 2019-03-08

## 2024-03-27 PROBLEM — E83.42 HYPOMAGNESEMIA: Status: ACTIVE | Noted: 2024-03-27

## 2024-03-27 PROBLEM — I49.1 PAC (PREMATURE ATRIAL CONTRACTION): Status: RESOLVED | Noted: 2023-07-05 | Resolved: 2024-03-27

## 2024-03-27 PROBLEM — F32.A DEPRESSION: Status: ACTIVE | Noted: 2024-03-27

## 2024-03-27 PROBLEM — R06.02 EXERTIONAL SHORTNESS OF BREATH: Status: RESOLVED | Noted: 2018-10-26 | Resolved: 2024-03-27

## 2024-03-27 LAB
25(OH)D3 SERPL-MCNC: 31.7 NG/ML (ref 30–100)
ALBUMIN SERPL BCP-MCNC: 3.8 G/DL (ref 3.5–5)
ALP SERPL-CCNC: 72 U/L (ref 34–104)
ALT SERPL W P-5'-P-CCNC: 9 U/L (ref 7–52)
ANION GAP SERPL CALCULATED.3IONS-SCNC: 9 MMOL/L (ref 4–13)
AST SERPL W P-5'-P-CCNC: 10 U/L (ref 13–39)
ATRIAL RATE: 0 BPM
ATRIAL RATE: 101 BPM
BASOPHILS # BLD AUTO: 0.03 THOUSANDS/ÂΜL (ref 0–0.1)
BASOPHILS NFR BLD AUTO: 0 % (ref 0–1)
BILIRUB SERPL-MCNC: 0.77 MG/DL (ref 0.2–1)
BUN SERPL-MCNC: 31 MG/DL (ref 5–25)
CALCIUM SERPL-MCNC: 9.4 MG/DL (ref 8.4–10.2)
CHLORIDE SERPL-SCNC: 100 MMOL/L (ref 96–108)
CHOLEST SERPL-MCNC: 105 MG/DL
CO2 SERPL-SCNC: 32 MMOL/L (ref 21–32)
CREAT SERPL-MCNC: 1.12 MG/DL (ref 0.6–1.3)
EOSINOPHIL # BLD AUTO: 0.09 THOUSAND/ÂΜL (ref 0–0.61)
EOSINOPHIL NFR BLD AUTO: 1 % (ref 0–6)
ERYTHROCYTE [DISTWIDTH] IN BLOOD BY AUTOMATED COUNT: 14.4 % (ref 11.6–15.1)
EST. AVERAGE GLUCOSE BLD GHB EST-MCNC: 126 MG/DL
GFR SERPL CREATININE-BSD FRML MDRD: 45 ML/MIN/1.73SQ M
GLUCOSE P FAST SERPL-MCNC: 115 MG/DL (ref 65–99)
GLUCOSE SERPL-MCNC: 115 MG/DL (ref 65–140)
HBA1C MFR BLD: 6 %
HCT VFR BLD AUTO: 38.8 % (ref 34.8–46.1)
HDLC SERPL-MCNC: 54 MG/DL
HGB BLD-MCNC: 13.1 G/DL (ref 11.5–15.4)
IMM GRANULOCYTES # BLD AUTO: 0.04 THOUSAND/UL (ref 0–0.2)
IMM GRANULOCYTES NFR BLD AUTO: 0 % (ref 0–2)
LDLC SERPL CALC-MCNC: 35 MG/DL (ref 0–100)
LYMPHOCYTES # BLD AUTO: 0.86 THOUSANDS/ÂΜL (ref 0.6–4.47)
LYMPHOCYTES NFR BLD AUTO: 9 % (ref 14–44)
MAGNESIUM SERPL-MCNC: 1.7 MG/DL (ref 1.9–2.7)
MCH RBC QN AUTO: 29.9 PG (ref 26.8–34.3)
MCHC RBC AUTO-ENTMCNC: 33.8 G/DL (ref 31.4–37.4)
MCV RBC AUTO: 89 FL (ref 82–98)
MONOCYTES # BLD AUTO: 0.84 THOUSAND/ÂΜL (ref 0.17–1.22)
MONOCYTES NFR BLD AUTO: 9 % (ref 4–12)
NEUTROPHILS # BLD AUTO: 7.44 THOUSANDS/ÂΜL (ref 1.85–7.62)
NEUTS SEG NFR BLD AUTO: 81 % (ref 43–75)
NONHDLC SERPL-MCNC: 51 MG/DL
NRBC BLD AUTO-RTO: 0 /100 WBCS
PHOSPHATE SERPL-MCNC: 2.6 MG/DL (ref 2.3–4.1)
PLATELET # BLD AUTO: 214 THOUSANDS/UL (ref 149–390)
PMV BLD AUTO: 11.1 FL (ref 8.9–12.7)
POTASSIUM SERPL-SCNC: 4.2 MMOL/L (ref 3.5–5.3)
PROT SERPL-MCNC: 6.6 G/DL (ref 6.4–8.4)
QRS AXIS: 0 DEGREES
QRS AXIS: 90 DEGREES
QRSD INTERVAL: 0 MS
QRSD INTERVAL: 122 MS
QT INTERVAL: 0 MS
QT INTERVAL: 410 MS
QTC INTERVAL: 0 MS
QTC INTERVAL: 490 MS
RBC # BLD AUTO: 4.38 MILLION/UL (ref 3.81–5.12)
SODIUM SERPL-SCNC: 141 MMOL/L (ref 135–147)
T WAVE AXIS: -21 DEGREES
T WAVE AXIS: 0 DEGREES
TRIGL SERPL-MCNC: 78 MG/DL
TSH SERPL DL<=0.05 MIU/L-ACNC: 2.1 UIU/ML (ref 0.45–4.5)
VENTRICULAR RATE: 0 BPM
VENTRICULAR RATE: 86 BPM
WBC # BLD AUTO: 9.3 THOUSAND/UL (ref 4.31–10.16)

## 2024-03-27 PROCEDURE — 97163 PT EVAL HIGH COMPLEX 45 MIN: CPT

## 2024-03-27 PROCEDURE — 84443 ASSAY THYROID STIM HORMONE: CPT

## 2024-03-27 PROCEDURE — 99222 1ST HOSP IP/OBS MODERATE 55: CPT | Performed by: NURSE PRACTITIONER

## 2024-03-27 PROCEDURE — 83735 ASSAY OF MAGNESIUM: CPT

## 2024-03-27 PROCEDURE — 82306 VITAMIN D 25 HYDROXY: CPT

## 2024-03-27 PROCEDURE — 93010 ELECTROCARDIOGRAM REPORT: CPT

## 2024-03-27 PROCEDURE — 93005 ELECTROCARDIOGRAM TRACING: CPT

## 2024-03-27 PROCEDURE — 85025 COMPLETE CBC W/AUTO DIFF WBC: CPT

## 2024-03-27 PROCEDURE — 80061 LIPID PANEL: CPT

## 2024-03-27 PROCEDURE — 84100 ASSAY OF PHOSPHORUS: CPT

## 2024-03-27 PROCEDURE — 80053 COMPREHEN METABOLIC PANEL: CPT

## 2024-03-27 PROCEDURE — 83036 HEMOGLOBIN GLYCOSYLATED A1C: CPT

## 2024-03-27 RX ORDER — LANOLIN ALCOHOL/MO/W.PET/CERES
400 CREAM (GRAM) TOPICAL 2 TIMES DAILY
Status: DISCONTINUED | OUTPATIENT
Start: 2024-03-27 | End: 2024-03-29 | Stop reason: HOSPADM

## 2024-03-27 RX ORDER — ESCITALOPRAM OXALATE 5 MG/1
5 TABLET ORAL DAILY
Status: DISCONTINUED | OUTPATIENT
Start: 2024-03-27 | End: 2024-03-29 | Stop reason: HOSPADM

## 2024-03-27 RX ORDER — SPIRONOLACTONE 25 MG/1
12.5 TABLET ORAL DAILY
Status: DISCONTINUED | OUTPATIENT
Start: 2024-03-29 | End: 2024-03-29 | Stop reason: HOSPADM

## 2024-03-27 RX ORDER — BUMETANIDE 1 MG/1
1 TABLET ORAL DAILY
Status: DISCONTINUED | OUTPATIENT
Start: 2024-03-29 | End: 2024-03-29 | Stop reason: HOSPADM

## 2024-03-27 RX ADMIN — LEVOTHYROXINE SODIUM 88 MCG: 88 TABLET ORAL at 05:12

## 2024-03-27 RX ADMIN — ESCITALOPRAM 5 MG: 5 TABLET, FILM COATED ORAL at 11:22

## 2024-03-27 RX ADMIN — BUMETANIDE 1 MG: 1 TABLET ORAL at 08:40

## 2024-03-27 RX ADMIN — DICLOFENAC SODIUM 2 G: 10 GEL TOPICAL at 11:37

## 2024-03-27 RX ADMIN — MELATONIN TAB 3 MG 3 MG: 3 TAB at 21:33

## 2024-03-27 RX ADMIN — METOPROLOL TARTRATE 25 MG: 25 TABLET, FILM COATED ORAL at 21:33

## 2024-03-27 RX ADMIN — DICLOFENAC SODIUM 2 G: 10 GEL TOPICAL at 21:34

## 2024-03-27 RX ADMIN — APIXABAN 2.5 MG: 2.5 TABLET, FILM COATED ORAL at 17:29

## 2024-03-27 RX ADMIN — DICLOFENAC SODIUM 2 G: 10 GEL TOPICAL at 17:29

## 2024-03-27 RX ADMIN — ATORVASTATIN CALCIUM 40 MG: 40 TABLET, FILM COATED ORAL at 17:29

## 2024-03-27 RX ADMIN — SPIRONOLACTONE 12.5 MG: 25 TABLET, FILM COATED ORAL at 08:28

## 2024-03-27 RX ADMIN — LOSARTAN POTASSIUM 25 MG: 25 TABLET, FILM COATED ORAL at 08:28

## 2024-03-27 RX ADMIN — Medication 400 MG: at 17:31

## 2024-03-27 RX ADMIN — METOPROLOL TARTRATE 25 MG: 25 TABLET, FILM COATED ORAL at 08:28

## 2024-03-27 RX ADMIN — Medication 400 MG: at 11:22

## 2024-03-27 RX ADMIN — APIXABAN 2.5 MG: 2.5 TABLET, FILM COATED ORAL at 08:40

## 2024-03-27 RX ADMIN — PANTOPRAZOLE SODIUM 20 MG: 20 TABLET, DELAYED RELEASE ORAL at 08:28

## 2024-03-27 RX ADMIN — DICLOFENAC SODIUM 2 G: 10 GEL TOPICAL at 09:43

## 2024-03-27 RX ADMIN — SENNOSIDES AND DOCUSATE SODIUM 1 TABLET: 8.6; 5 TABLET ORAL at 08:28

## 2024-03-27 NOTE — CMS CERTIFICATION NOTE
Recertification: Based upon physical, mental and social evaluations, I certify that inpatient psychiatric services continue to be medically necessary for this patient for a duration of 10 midnights for the treatment of  Depression Available alternative community resources still do not meet the patient's mental health care needs. I further attest that an established written individualized plan of care has been updated and is outlined in the patient's medical records.

## 2024-03-27 NOTE — PLAN OF CARE
Problem: PHYSICAL THERAPY ADULT  Goal: Performs mobility at highest level of function for planned discharge setting.  See evaluation for individualized goals.  Description: Treatment/Interventions: ADL retraining, Functional transfer training, LE strengthening/ROM, Elevations, Therapeutic exercise, Endurance training, Patient/family training, Equipment eval/education, Bed mobility, Gait training, Spoke to nursing, Spoke to case management, OT  Equipment Recommended: Walker       See flowsheet documentation for full assessment, interventions and recommendations.  Outcome: Progressing  Note: Prognosis: Good  Problem List: Decreased strength, Decreased endurance, Impaired balance, Decreased mobility, Decreased coordination     Barriers to Discharge: Inaccessible home environment     Rehab Resource Intensity Level, PT: No post-acute rehabilitation needs    See flowsheet documentation for full assessment.

## 2024-03-27 NOTE — H&P
Psychiatric Evaluation - Behavioral Health   Rosalind Wise 83 y.o. female MRN: [id number]  Unit/Bed#: [facility] 607-02 Encounter: [id number]    Assessment/Plan   Active Problems:  There are no active Hospital Problems.    Plan:   -Admit to inpatient psychiatry  -Q7min Checks  -Milieu Therapy, Group Therapy, Case Management  -Start Escitalopram 5mg Daily    Current Facility-Administered Medications   Medication Dose Route Frequency Provider Last Rate    acetaminophen  650 mg Oral Q4H PRN HIEU Palacios      acetaminophen  650 mg Oral Q4H PRN HIEU Palacios      acetaminophen  975 mg Oral Q6H PRN HIEU Palacios      aluminum-magnesium hydroxide-simethicone  30 mL Oral Q4H PRN HIEU Palacios      apixaban  2.5 mg Oral BID HIEU Mann      Artificial Tears  1 drop Both Eyes Q3H PRN HIEU Palacios      atorvastatin  40 mg Oral QPM HIEU Mann      benztropine  1 mg Intramuscular Q4H PRN Max 6/day HIEU Palacios      benztropine  0.5 mg Oral Q4H PRN Max 6/day HIEU Palacios      bisacodyl  10 mg Rectal Daily PRN HIEU Palacios      bumetanide  1 mg Oral Daily HIEU Mann      Diclofenac Sodium  2 g Topical 4x Daily HIEU Mann      hydrOXYzine HCL  25 mg Oral Q6H PRN Max 4/day HIEU Palacios      hydrOXYzine HCL  50 mg Oral Q6H PRN Max 4/day HIEU Palacios      levothyroxine  88 mcg Oral Daily HIEU Mann      LORazepam  1 mg Intramuscular Q6H PRN Max 3/day HIEU Palacios      LORazepam  1 mg Oral Q6H PRN Max 3/day HIEU Palacios      losartan  25 mg Oral Daily Humera HIEU Haddad      melatonin  3 mg Oral HS HIEU Mann      metoprolol tartrate  25 mg Oral BID HIEU Mann      OLANZapine  5 mg Intramuscular Q3H PRN Max 3/day HIEU Palacios      OLANZapine  2.5 mg Oral Q4H PRN Max 6/day HIEU Palacios      OLANZapine  5 mg Oral Q4H PRN Max 3/day HIEU Palacios       "OLANZapine  5 mg Oral Q3H PRN Max 3/day HIEU Palacios      pantoprazole  20 mg Oral Daily Humera GARSIA HIEU Hoyt      polyethylene glycol  17 g Oral Daily PRN HIEU Palacios      propranolol  5 mg Oral Q8H PRN HIEU Palacios      senna-docusate sodium  1 tablet Oral Daily Humera GARSAI HIEU Hoyt      senna-docusate sodium  1 tablet Oral Daily PRN HIEU Palacios      spironolactone  12.5 mg Oral Daily Humera HIEU Haddad      traZODone  50 mg Oral HS PRN HIEU Palacios       Risks, benefits and possible side effects of Medications:   Risks, benefits, and possible side effects of medications explained to patient and patient verbalizes understanding.      Chief Complaint: \"I wanted to die\"    History of Present Illness     Patient is a 83 y.o. female presents with Signs of suicidal potential.  Patient was admitted to psychiatric unit on a voluntarily 201 commitment basis.    Primary complaints include: depression worse.  Onset of symptoms was gradual starting 1 year ago with gradually worsening course since that time. Psychosocial Stressors: health, marital, and Grief.    Per H&P Note by Dr. Perdomo on 3/22/24  Rosalind Wise is a 83 y.o. female with a PMH of CHF, A-fib, CKD stage III, hypothyroid, hyperlipidemia, GERD, sciatica, prediabetes who presents with intentional overdose.     Patient presents secondary to intentional drug overdose.  Patient reports she has been feeling upset since the passing of her .  She attempted to kill herself by ingesting medications which she is unable to recall names of them.  Per ED note she ingested Zofran, hydralazine, Benadryl, and Tylenol PM.  Family found her drowsy prior to calling 911.  She reports that she has never been on medication for anxiety or depression.  She reports history of suicidal ideations but is never acted on it.  She denies any homicidal ideations, visual hallucinations, or auditory hallucinations.  She denies any " "illicit drug use, tobacco use, or alcohol use.    Patient was admitted to medicine for treamtent for overdose. Per Discharge Summary by Dr. Gilliam on 3/26/24 hospital course is as follows:     Rosalind Wise is a 83 y.o. female patient who originally presented to the hospital on 3/22/2024 due to  intentional overdose with suicidal ideations.  Patient ingested unknown amounts of Zofran, Atarax, Plavix, Benadryl, Tylenol cold and flu tablets.  Acetaminophen level was elevated 26 and in the ED, case was discussed with toxicology who recommended supportive care and NAC therapy.  Patient had signs of anticholinergic effects.  Patient was eventually placed on one-to-one for suicidal ideations although she denies any history of SI.  She reports feeling depressed since her  had passed away and soon began regretting her attempt.  Psychiatry was consulted who saw her yesterday and recommended a 201 which the patient agreed to.  Patient will be transferred to acute behavioral health facility.  Furthermore, patient was noted to be A-fib with RVR and was started on a Cardizem drip.  Patient was eventually maintained on metoprolol and Eliquis twice daily.  Cardiology was consulted who recommended to maintain these medications.  Otherwise, patient has been medically stable and can be transferred to acute behavioral health facility.       On interview, patient is notably a poor historian. Stated that she was feeling down and felt like she was a burden to her family. States she took a lot of pills because she thought that she was too much to care for in the house due to her heart problems. States she had Methodist thoughts of \"God doesn't want me\" and \"God please forgive me, I don't want to live anymore.\" States it was impulsive. Denied history of suicidal thoughts or suicide attempt in the past.     Patient states that she had a stroke last year in February and after that, she had worse depression. States it was after her "  passed away and she misses him. States she was alone in the house at that time and always felt anxious when she was alone. Endorse worse anxiety with her  passing away. States she was provided pills to take for sleep as she was having trouble sleeping. Does not remember what the medication is. Endorses getting confused from time to time. Denies SI/Hi/AVH currently.     Psychiatric Review Of Systems:  sleep: yes I don't sleep. I take tylenol PM to fall asleep  appetite changes: yes I don't eat everything  weight changes: yes Used to be 110 but now 148  energy/anergy: no  interest/pleasure/anhedonia: no but can't do things like I used to  somatic symptoms: no  anxiety/panic: yes  martha: no  guilty/hopeless: no  self injurious behavior/risky behavior: no    Historical Information     Past Psychiatric History:   Inpatient Treatment: Denies  Outpatient Treatment: Denies  Past Suicide Attempts: Denies  Past Violent Behavior: Denies  Past Psychiatric Medication Trials:   Took some to help with sleep. They're bad.     Substance Abuse History:  E-Cigarette/Vaping    E-Cigarette Use Never User       E-Cigarette/Vaping Substances    Nicotine No     THC No     CBD No     Flavoring No     Other No     Unknown No        Social History       Tobacco History       Smoking Status  Never      Smokeless Tobacco Use  Never              Alcohol History       Alcohol Use Status  Yes Drinks/Week  0 Glasses of wine, 0 Cans of beer, 0 Shots of liquor, 0 Standard drinks or equivalent per week Comment  Only on special occasions              Drug Use       Drug Use Status  Never              Sexual Activity       Sexually Active  Not Currently Partners  Male Birth Control/Protection  Post-menopausal              Activities of Daily Living    Not Asked                   I have assessed this patient for substance use within the past 12 months    Family Psychiatric History:   Denies    Social History:  Education: no high  school  Learning Disabilities:  Denies  Marital history:   Children: 1  Living arrangement, social support: The patient lives in home with son, age 63y.o.  Occupational History: retired  Functioning Relationships: good support system.  Other Pertinent History:  Legal History: Denies   History: Denies      Traumatic History:   Abuse: Denies  Other Traumatic Events: Denies    Past Medical History:   Diagnosis Date    Anxiety     Arthritis     joints    Bunion     Cataract     Disease of thyroid gland     Eczema     GERD (gastroesophageal reflux disease)     Gout     Heart failure (HCC)     Hepatitis     Hiatal hernia     Hypertension     Onychomycosis     last assessed: 4/29/16    Orthopnea     resolved: 1/8/16    Pseudogout of left wrist     Sleep apnea     wears CPAP    Stroke (HCC)        Medical Review Of Systems:  Pertinent items are noted in HPI.    Meds/Allergies   current meds:   Current Facility-Administered Medications   Medication Dose Route Frequency    acetaminophen (TYLENOL) tablet 650 mg  650 mg Oral Q4H PRN    acetaminophen (TYLENOL) tablet 650 mg  650 mg Oral Q4H PRN    acetaminophen (TYLENOL) tablet 975 mg  975 mg Oral Q6H PRN    aluminum-magnesium hydroxide-simethicone (MAALOX) oral suspension 30 mL  30 mL Oral Q4H PRN    apixaban (ELIQUIS) tablet 2.5 mg  2.5 mg Oral BID    Artificial Tears ophthalmic solution 1 drop  1 drop Both Eyes Q3H PRN    atorvastatin (LIPITOR) tablet 40 mg  40 mg Oral QPM    benztropine (COGENTIN) injection 1 mg  1 mg Intramuscular Q4H PRN Max 6/day    benztropine (COGENTIN) tablet 0.5 mg  0.5 mg Oral Q4H PRN Max 6/day    bisacodyl (DULCOLAX) rectal suppository 10 mg  10 mg Rectal Daily PRN    [START ON 3/29/2024] bumetanide (BUMEX) tablet 1 mg  1 mg Oral Daily    Diclofenac Sodium (VOLTAREN) 1 % topical gel 2 g  2 g Topical 4x Daily    escitalopram (LEXAPRO) tablet 5 mg  5 mg Oral Daily    hydrOXYzine HCL (ATARAX) tablet 25 mg  25 mg Oral Q6H PRN Max 4/day     hydrOXYzine HCL (ATARAX) tablet 50 mg  50 mg Oral Q6H PRN Max 4/day    levothyroxine tablet 88 mcg  88 mcg Oral Daily    LORazepam (ATIVAN) injection 1 mg  1 mg Intramuscular Q6H PRN Max 3/day    LORazepam (ATIVAN) tablet 1 mg  1 mg Oral Q6H PRN Max 3/day    losartan (COZAAR) tablet 25 mg  25 mg Oral Daily    magnesium Oxide (MAG-OX) tablet 400 mg  400 mg Oral BID    melatonin tablet 3 mg  3 mg Oral HS    metoprolol tartrate (LOPRESSOR) tablet 25 mg  25 mg Oral BID    OLANZapine (ZyPREXA) IM injection 5 mg  5 mg Intramuscular Q3H PRN Max 3/day    OLANZapine (ZyPREXA) tablet 2.5 mg  2.5 mg Oral Q4H PRN Max 6/day    OLANZapine (ZyPREXA) tablet 5 mg  5 mg Oral Q4H PRN Max 3/day    OLANZapine (ZyPREXA) tablet 5 mg  5 mg Oral Q3H PRN Max 3/day    pantoprazole (PROTONIX) EC tablet 20 mg  20 mg Oral Daily    polyethylene glycol (MIRALAX) packet 17 g  17 g Oral Daily PRN    propranolol (INDERAL) tablet 5 mg  5 mg Oral Q8H PRN    senna-docusate sodium (SENOKOT S) 8.6-50 mg per tablet 1 tablet  1 tablet Oral Daily    senna-docusate sodium (SENOKOT S) 8.6-50 mg per tablet 1 tablet  1 tablet Oral Daily PRN    [START ON 3/29/2024] spironolactone (ALDACTONE) tablet 12.5 mg  12.5 mg Oral Daily    traZODone (DESYREL) tablet 50 mg  50 mg Oral HS PRN     No Known Allergies    Objective   Vital signs in last 24 hours:  Temp:  [97.6 °F (36.4 °C)-98.6 °F (37 °C)] 97.6 °F (36.4 °C)  HR:  [] 90  Resp:  [16] 16  BP: (111-136)/(56-70) 131/63      Intake/Output Summary (Last 24 hours) at 3/27/2024 0947  Last data filed at 3/27/2024 0859  Gross per 24 hour   Intake 0 ml   Output --   Net 0 ml       Mental Status Evaluation:  Appearance:  age appropriate   Behavior:  normal   Speech:  dysarthric and thick accent   Mood:  anxious and depressed   Affect:  labile   Language: THIck accent   Thought Process:  normal and logical   Associations: tangential associations   Thought Content:  normal   Perceptual Disturbances: None   Risk Potential:  Suicidal Ideations without plan  Homicidal Ideations none  Potential for Aggression No   Sensorium:  person, place, and time/date   Memory:  Recent and remote memory limited   Consciousness:  alert and awake    Attention: attention span and concentration were age appropriate   Intellect: within normal limits   Fund of Knowledge: Not formally assessed   Insight:  limited   Judgment: limited   Muscle Strength:  Muscle Tone: Not formally assessed  Not formally assessed   Gait/Station: Laying in Bed   Motor Activity: no abnormal movements     Lab Results: I have personally reviewed all pertinent laboratory/tests results. Most Recent Labs:   Lab Results   Component Value Date    WBC 9.30 03/27/2024    RBC 4.38 03/27/2024    HGB 13.1 03/27/2024    HCT 38.8 03/27/2024     03/27/2024    RDW 14.4 03/27/2024    NEUTROABS 7.44 03/27/2024    SODIUM 141 03/27/2024    K 4.2 03/27/2024     03/27/2024    CO2 32 03/27/2024    BUN 31 (H) 03/27/2024    CREATININE 1.12 03/27/2024    GLUC 115 03/27/2024    GLUF 115 (H) 03/27/2024    CALCIUM 9.4 03/27/2024    AST 10 (L) 03/27/2024    ALT 9 03/27/2024    ALKPHOS 72 03/27/2024    TP 6.6 03/27/2024    ALB 3.8 03/27/2024    TBILI 0.77 03/27/2024    CHOLESTEROL 105 03/27/2024    HDL 54 03/27/2024    TRIG 78 03/27/2024    LDLCALC 35 03/27/2024    NONHDLC 51 03/27/2024    AMMONIA 15 (L) 03/22/2024    [id number] 2.103 03/27/2024    FREET4 1.50 (H) 08/14/2020    HGBA1C 6.0 (H) 03/27/2024     03/27/2024      Imaging Studies: Chest X-ray on 3/22/24  Narrative & Impression   XR CHEST PORTABLE     INDICATION: od.     COMPARISON: CXR 10/04/2023 and 3/24/2021. Abdomen CT 10/04/2023.     FINDINGS:     Mild pulmonary venous congestion. No pneumothorax or pleural effusion.     Mild cardiomegaly.     Bones are unremarkable for age.     Normal upper abdomen.     IMPRESSION:     Mild pulmonary venous congestion.     EKG, Pathology, and Other Studies: qtc 490    Code Status: Level 1 -  Full Code  Advance Directive and Living Will:      Power of :    POLST:      Patient Strengths/Assets: cooperative, family ties, [employer]ist affiliation    Patient Barriers/Limitations: impaired cognition, low self esteem, poor physical health

## 2024-03-27 NOTE — PLAN OF CARE
Problem: Alteration in Thoughts and Perception  Goal: Verbalize thoughts and feelings  Description: Interventions:  - Promote a nonjudgmental and trusting relationship with the patient through active listening and therapeutic communication  - Assess patient's level of functioning, behavior and potential for risk  - Engage patient in 1 on 1 interactions  - Encourage patient to express fears, feelings, frustrations, and discuss symptoms    - [place] patient to reality, help patient recognize reality-based thinking   - Administer medications as ordered and assess for potential side effects  - Provide the patient education related to the signs and symptoms of the illness and desired effects of prescribed medications  Outcome: Progressing  Goal: Refrain from acting on delusional thinking/internal stimuli  Description: Interventions:  - Monitor patient closely, per order   - Utilize least restrictive measures   - Set reasonable limits, give positive feedback for acceptable   - Administer medications as ordered and monitor of potential side effects  Outcome: Progressing  Goal: Agree to be compliant with medication regime, as prescribed and report medication side effects  Description: Interventions:  - Offer appropriate PRN medication and supervise ingestion; conduct AIMS, as needed   Outcome: Progressing     Problem: Ineffective Coping  Goal: Demonstrates healthy coping skills  Outcome: Progressing

## 2024-03-27 NOTE — PROGRESS NOTES
03/27/24 1446   Team Meeting   Meeting Type Tx Team Meeting   Initial Conference Date 03/27/24   Team Members Present   Team Members Present Physician;Nurse;   Physician Team Member Yolanda   Nursing Team Member Maximiliano   Care Management Team Member Beto   Patient/Family Present   Patient Present Yes   Patient's Family Present No     Tx plan was reviewed and discussed with Pt. Pt was encouraged to attend groups. Medication was discussed with Pt. Pt signed tx plan.

## 2024-03-27 NOTE — SOCIAL WORK
"Psychosocial Assessment 1:1:        Admission / Details: Patient is an 82 y/o female with intentional OD of unknown amounts of zofran, atarax, clopidogrel, benadryl, as well as 3 tylenol cold and flu tablets. She states she took the meds at 10am and has been fatigued but denies any complaints otherwise. She states she has been depressed since her   of PD 6 years ago. Grandson found her sitting on her bedroom floor and was incontinent of stool. Son and Grandson report she is closer to baseline now then when she was initially found. They are unsure of quantity of medication. She is in afib rvr at presentation, but awake, alert and oriented without focal deficit.  States she regrets the suicide attempt, \"believed she would die right away\" and that her family would be \"happy\" and she would \"no longer be a burden\". Reports she is Caodaism and asked for forgiveness prior to intentional overdose.      Major Depressive Disorder - Recurrent Episode       County: [place]      Commitment Status: [facility]  Insurance: Medicare/AARP  Rx coverage: Medicare/AARP  Marital Status:    Children: Yes, grown children.   Family: Children   Residence: [address] [place] [address]  Can return home: Yes   Lives with: Self   Level of Ed: Did not attend high school   Work History: Retired   Income/Source: Pension/[employer]   Pentecostalism: Caodaism   Transportation: Family   Legal Issues: None   Pharmacy: [facility] [phone] or Fax at [phone]  MH Treatment Hx: She reports that she has never been on medication for anxiety or depression. She reports history of suicidal ideations but is never acted on it. She denies any homicidal ideations, visual hallucinations, or auditory hallucinations.   Trauma Hx: Denied   Family Hx: No MH known but family has history of diabetes and hypertension.   D&A Hx: She denies any illicit drug use, tobacco use, or alcohol use.  Medical: PMH of CHF, A-fib, CKD stage III, " "hypothyroid, hyperlipidemia, GERD, sciatica, prediabetes who presents with intentional overdose.   DME: [facility]   Tobacco: Never    Hx: None   Access to firearms: None   UDS Results: Normal   PCP: Suzanna Lopez [phone]   Psych:  Patient not willing to see a psychiatrist or a therapist at this point. She \"does not believe in it and it makes her sad.\"  Therapist: Patient not willing to see a psychiatrist or a therapist at this point. She \"does not believe in it and it makes her sad.\"  ICM/ACT:  None   Community Supports: Children and Grandchildren.   Stressors: She endorses grief due to loss of spouse and friends, social isolation, feeling like a burden, and loss of independence.   Strengths: Communication, here willingly.   ROIs Signed: Everett Wise (Son) [phone], Kateryna Hathaway (Daughter In Law) [phone]  Treatment Plan Signed: Yes    IMM Signed: Yes       "

## 2024-03-27 NOTE — PHYSICAL THERAPY NOTE
Physical Therapy Evaluation    Patient's Name: Rosalind Wise    Admitting Diagnosis  Major depressive disorder, single episode, unspecified [F32.9]  Depression [F32.A]    Problem List  Patient Active Problem List   Diagnosis    Benign essential hypertension    Chronic diastolic (congestive) heart failure (HCC)    Hypothyroidism    Arthropathy of knee    Hallux abductovalgus with bunions, unspecified laterality    CKD (chronic kidney disease), stage III (HCC)    Diverticulitis of colon    Chronic gout without tophus    Hiatal hernia    Hyperlipemia    Hyperuricemia    Nephrolithiasis    WENDI (obstructive sleep apnea)    Pseudogout of left wrist    Gastroesophageal reflux disease without esophagitis    Medical clearance for psychiatric admission    Prediabetes    Status post right frontotemporal replacement cranioplasty    Sciatica of left side    Spinal stenosis of lumbar region with neurogenic claudication    Cervical radiculopathy    Paroxysmal atrial fibrillation (HCC)    Intentional overdose (HCC)    Atrial fibrillation with RVR (HCC)    Hypomagnesemia    Depression       Past Medical History  Past Medical History:   Diagnosis Date    Anxiety     Arthritis     joints    Bunion     Cataract     Disease of thyroid gland     Eczema     GERD (gastroesophageal reflux disease)     Gout     Heart failure (HCC)     Hepatitis     Hiatal hernia     Hypertension     Onychomycosis     last assessed: 16    Orthopnea     resolved: 16    Pseudogout of left wrist     Sleep apnea     wears CPAP    Stroke (HCC)        Past Surgical History  Past Surgical History:   Procedure Laterality Date    ABDOMINAL SURGERY          BRAIN HEMATOMA EVACUATION Right 2020    Procedure: Right decompressive craniectomy and evacuation of intraparenchymal hematoma;  Surgeon: Apolinar Herrera MD;  [facility]: [facility];  Service: Neurosurgery    BREAST SURGERY Right     cyst removal    CARPAL TUNNEL RELEASE Left 1989    CARPAL  TUNNEL RELEASE Right     CATARACT EXTRACTION       SECTION  1960    x1    COLONOSCOPY N/A 2018    Procedure: COLONOSCOPY;  Surgeon: Alejandro Carlson MD;  Location: [facility] GI LAB;  Service: Gastroenterology    DILATION AND CURETTAGE OF UTERUS  1967    EYE SURGERY Left 2006    cataracts    HERNIA REPAIR      umbilical hernia    INCISIONAL HERNIA REPAIR      incarcerated    KNEE ARTHROSCOPY Right     DE RPLCMT BONE FLAP/PROSTHETIC PLATE SKULL Right 2020    Procedure: right frontotemporal replacement cranioplasty;  Surgeon: Apolinar Herrera MD;  Location:  MAIN OR;  Service: Neurosurgery    DE XCAPSL CTRC RMVL INSJ IO LENS PROSTH W/O ECP Right 3/27/2017    Procedure: EXTRACTION EXTRACAPSULAR CATARACT PHACO INTRAOCULAR LENS (IOL);  Surgeon: Trip Gilbert MD;  Location: [facility] MAIN OR;  Service: Ophthalmology       Recent Imaging  No orders to display       Recent Vital Signs  Vitals:    24 2218 24 0808 24 1300 24 1558   BP: 111/56 131/63  126/54   BP Location:  Right arm  Left arm   Pulse: 97 90  60   Resp:  16  16   Temp:  97.6 °F (36.4 °C)  97.6 °F (36.4 °C)   TempSrc:  Temporal  Temporal   SpO2:  93%  91%   Weight:   68.1 kg (150 lb 3.2 oz)    Height:            24 1530   PT Last Visit   PT Visit Date 24   Note Type   Note type Evaluation   Pain Assessment   Pain Assessment Tool 0-10   Pain Score No Pain   Restrictions/Precautions   Weight Bearing Precautions Per Order No   Home Living   Type of Home House   Home Layout Two level;Stairs to enter with rails;Bed/bath upstairs   Prior Function   Level of [place] Independent with ADLs;Independent with functional mobility;Needs assistance with IADLS   Lives With Son;Daughter   Receives Help From Family   Falls in the last 6 months 0   General   Family/Caregiver Present No   Cognition   Overall Cognitive Status WFL   Orientation Level Oriented X4   Memory Within functional limits   Following Commands Follows one  step commands without difficulty   RLE Assessment   RLE Assessment   (4/5)   LLE Assessment   LLE Assessment   (4/5)   Coordination   Movements are Fluid and Coordinated 0   Coordination and Movement Description mild coordination deficit and decreased balance   Sensation WFL   Light Touch   RLE Light Touch Grossly intact   LLE Light Touch Grossly intact   Bed Mobility   Supine to Sit 5  Supervision   Additional items Increased time required   Sit to Supine 5  Supervision   Additional items Increased time required   Transfers   Sit to Stand 5  Supervision   Additional items Increased time required   Stand to Sit 5  Supervision   Additional items Increased time required   Ambulation/Elevation   Gait pattern Step through pattern;Decreased toe off;Decreased heel strike;Excessively slow;Short stride;Decreased foot clearance;Improper Weight shift   Gait Assistance 5  Supervision   Additional items Verbal cues   Assistive Device Rolling walker   Distance 200ft   Balance   Static Sitting Fair +   Dynamic Sitting Fair   Static Standing Fair   Dynamic Standing Fair -   Ambulatory Fair -   Endurance Deficit   Endurance Deficit Yes   Endurance Deficit Description fatigue, reduced from baseline   Activity Tolerance   Activity Tolerance Patient limited by fatigue   Medical Staff Made Aware spoke to CM   Nurse Made Aware spoke to RN   Assessment   Prognosis Good   Problem List Decreased strength;Decreased endurance;Impaired balance;Decreased mobility;Decreased coordination   Barriers to Discharge Inaccessible home environment   Goals   Patient Goals to get better   STG Expiration Date 04/06/24   Short Term Goal #1 see eval note   PT Treatment Day 0   Plan   Treatment/Interventions ADL retraining;Functional transfer training;LE strengthening/ROM;Elevations;Therapeutic exercise;Endurance training;Patient/family training;Equipment eval/education;Bed mobility;Gait training;Spoke to nursing;Spoke to case management;OT   PT Frequency  1-2x/wk   Discharge Recommendation   Rehab Resource Intensity Level, PT No post-acute rehabilitation needs   Equipment Recommended Walker   Walker Package Recommended Wheeled walker   AM-PAC Basic Mobility Inpatient   Turning in Flat Bed Without Bedrails 4   Lying on Back to Sitting on Edge of Flat Bed Without Bedrails 4   Moving Bed to Chair 4   Standing Up From Chair Using Arms 4   Walk in Room 3   Climb 3-5 Stairs With Railing 3   Basic Mobility Inpatient Raw Score 22   Basic Mobility Standardized Score 47.4   [facility] Highest Level Of Mobility   -HLM Goal 7: Walk 25 feet or more   -HLM Achieved 8: Walk 250 feet ot more   End of Consult   Patient Position at End of Consult All needs within reach;Seated edge of bed         ASSESSMENT                                                                                                                     Rosalind Wise is a 83 y.o. female admitted to [facility] on 3/26/2024 for Depression. Pt  has a past medical history of Anxiety, Arthritis, Bunion, Cataract, Disease of thyroid gland, Eczema, GERD (gastroesophageal reflux disease), Gout, Heart failure ([facility]), Hepatitis, Hiatal hernia, Hypertension, Onychomycosis, Orthopnea, Pseudogout of left wrist, Sleep apnea, and Stroke ([facility]).. PT was consulted and pt was seen on 3/27/2024 for mobility assessment and d/c planning.  Impairments limiting pt at this time include decreased ROM, impaired balance, decreased endurance, decreased coordination, decreased IADLS, decreased sensation, and decreased strength. Pt is currently functioning at a supervision assistance x1 level for bed mobility, supervision assistance x1 level for transfers, supervision assistance x1 level for ambulation with Rolling Walker. The patient's AM-PAC Basic Mobility Inpatient Short Form Raw Score is 22. A Raw score of greater than 16 suggests the patient may benefit from discharge to home. Please also refer to the recommendation of the Physical  Therapist for safe discharge planning.    Goals                                                                                                                                    1) Bed mobility skills with modified independent assistance to facilitate safe return to previous living environment 2) Functional transfers with modified independent assistance to facilitate safe return to previous living environment  3) Ambulation with least restrictive AD modified independent assistance without LOB and stable vitals for safe ambulation home/ community distances. 4) Stair training up/down flight 12 step/s with appropriate rail/s  and modified independent assistance for safe access to previous living environment. 5) Improve balance grades to fair + to reduce risk of falls. 6)Improve LE strength grades by 1 to increase independence w/ transfers and gait.  7) PT for ongoing pt and family education; DME needs and D/C planning to promote highest level of function in least restrictive environment.     Recommendations                                                                                                              Pt will benefit from continued skilled IP PT to address the above mentioned impairments in order to maximize recovery and increase functional independence when completing mobility and ADLs. See flow sheet for goals and POC.     DME: Rolling Walker    Discharge Disposition:  Home with no needs      Daniele Luna PT, DPT

## 2024-03-27 NOTE — CONSULTS
[facility]  Consult  Name: Rosalind Wise 83 y.o. female I MRN: [id number]  Unit/Bed#: [facility] 607-02 I Date of Admission: 3/26/2024   Date of Service: 3/27/2024 I Hospital Day: 1    Inpatient consult for Medical Clearance for  patient  Consult performed by: HIEU Durbin  Consult ordered by: HIEU Palacios          Assessment/Plan   Medical clearance for psychiatric admission  Assessment & Plan  Patient is medically cleared for admission to [facility] and treatment of underlying psychiatric illness based on available results  Admission labs reviewed, CBC, CMP, lipid panel, TSH, acceptable   BUN elevated, will hold Bumex and HCTZ for 1 day   Vitals stable   EKG reveals controlled AFib with T wave abnormalities, 86 bpm 490 QTC   Will repeat an EKG tomorrow morning for comparison  Currently, chest pain free. No shortness of breath or diaphoresis.  Will need outpatient follow-up for abnormal EKG  Medically stable for continued inpatient psychiatric treatment based on available results  Please contact SLIM with any questions or concerns    Paroxysmal atrial fibrillation (HCC)  Assessment & Plan  EKG shows controlled AFib  Continue Lopressor 25 mg BID and Eliquis 2.5 mg BID     Hypomagnesemia  Assessment & Plan  Mag 1.7  Replete with Mag Oxide 400 mg BID for 4 days  Repeat Mag level in 5 days     Chronic diastolic (congestive) heart failure (HCC)  Assessment & Plan  Wt Readings from Last 3 Encounters:   03/26/24 67.5 kg (148 lb 12.8 oz)   03/26/24 71 kg (156 lb 8.4 oz)   02/27/24 70.3 kg (155 lb)     Appears euvolemic. Weight improved.   Will hold Bumex and HCTZ for 1 day given rise in BUN and concern for dehydration   Continue BB and losartan     Benign essential hypertension  Assessment & Plan  BP stable  Continue home losartan  Hold Bumex and HCTZ for 1 day given slight increase in BUN and concern for dehydration     Intentional overdose (HCC)  Assessment & Plan  Presented  to [facility] after intentional overdose on her prescribed medications. Patient was evaluated by psychiatry and recommended inpatient psychiatric treatment.   Admitted to [facility] at [facility]   Management per primary service     Prediabetes  Assessment & Plan  Follow-up pending A1c  Patient with CKD so would need to be cautious of starting oral agents    Gastroesophageal reflux disease without esophagitis  Assessment & Plan  Continue PPI  Maalox PRN     WENDI (obstructive sleep apnea)  Assessment & Plan  CPAP at     Hyperlipemia  Assessment & Plan  Continue statin     CKD (chronic kidney disease), stage III (HCC)  Assessment & Plan  Lab Results   Component Value Date    EGFR 45 03/27/2024    EGFR 60 03/26/2024    EGFR 54 03/25/2024    CREATININE 1.12 03/27/2024    CREATININE 0.88 03/26/2024    CREATININE 0.96 03/25/2024   Cr near baseline  Avoid nephrotoxic agents, hypotension, contrast dye     Hypothyroidism  Assessment & Plan  Continue Synthroid 88 mcg daily              Recommendations for Discharge:  SLIM will sign off - please call with questions or concerns.  Follow up with PCP upon discharge.     Counseling / Coordination of Care Time: 30 minutes.  Greater than 50% of total time spent on patient counseling and coordination of care.    Collaboration of Care: Were Recommendations Directly Discussed with Primary Treatment Team? - Yes     History of Present Illness:    Rosalind Wise is a 83 y.o. female with a past medical history including CHF, hypertension, hyperlipidemia, A-fib on Eliquis, GERD, hypothyroidism, and CKD who is originally admitted to the psychiatric service due to intentional overdose of prescribed medications. We are consulted for medical clearance for psychiatric hospitalization and medical management.  Initially, patient presented to [facility] after intentional overdose for which she was admitted for further evaluation and treatment.  Patient ingested an unknown amount of  Zofran, Atarax, Plavix, Benadryl, Tylenol cold and flu tablets per chart review.  Case was discussed with toxicology who recommended supportive care with NAC therapy.  Patient had no signs of anticholinergic effects.  She was on a one-to-one for suicidal ideations.  Patient indicated feeling depressed after the passing of her .  Patient was seen by psychiatry and recommended inpatient behavioral health.  Patient signed a 201 and was transferred to [place] for inpatient behavioral health.  Currently, she is resting in bed.  She is comfortable.  She offers no acute complaints.      Review of Systems:    Review of Systems   Constitutional:  Negative for appetite change and chills.   HENT:  Negative for congestion and sore throat.    Eyes:  Negative for visual disturbance.   Respiratory:  Negative for cough and shortness of breath.    Cardiovascular:  Negative for chest pain.   Gastrointestinal:  Negative for abdominal pain, constipation, diarrhea, nausea and vomiting.   Genitourinary:  Negative for difficulty urinating.   Musculoskeletal:  Negative for gait problem.   Skin:  Negative for wound.   Neurological:  Negative for dizziness, light-headedness and headaches.       Past Medical and Surgical History:     Past Medical History:   Diagnosis Date    Anxiety     Arthritis     joints    Bunion     Cataract     Disease of thyroid gland     Eczema     GERD (gastroesophageal reflux disease)     Gout     Heart failure (HCC)     Hepatitis     Hiatal hernia     Hypertension     Onychomycosis     last assessed: 16    Orthopnea     resolved: 16    Pseudogout of left wrist     Sleep apnea     wears CPAP    Stroke (HCC)        Past Surgical History:   Procedure Laterality Date    ABDOMINAL SURGERY          BRAIN HEMATOMA EVACUATION Right 2020    Procedure: Right decompressive craniectomy and evacuation of intraparenchymal hematoma;  Surgeon: Apolinar Herrera MD;  Location: [facility];  Service:  Neurosurgery    BREAST SURGERY Right     cyst removal    CARPAL TUNNEL RELEASE Left     CARPAL TUNNEL RELEASE Right     CATARACT EXTRACTION       SECTION  1960    x1    COLONOSCOPY N/A 2018    Procedure: COLONOSCOPY;  Surgeon: Alejandro Carlson MD;  Location: [facility] GI LAB;  Service: Gastroenterology    DILATION AND CURETTAGE OF UTERUS  1967    EYE SURGERY Left 2006    cataracts    HERNIA REPAIR      umbilical hernia    INCISIONAL HERNIA REPAIR      incarcerated    KNEE ARTHROSCOPY Right     W[place] RPLCMT BONE FLAP/PROSTHETIC PLATE SKULL Right 2020    Procedure: right frontotemporal replacement cranioplasty;  Surgeon: Apolinar Herrera MD;  Location:  MAIN OR;  Service: Neurosurgery    WY XCAPSL CTRC RMVL INSJ IO LENS PROSTH W/O ECP Right 3/27/2017    Procedure: EXTRACTION EXTRACAPSULAR CATARACT PHACO INTRAOCULAR LENS (IOL);  Surgeon: Trip Gilbert MD;  Location: [facility] MAIN OR;  Service: Ophthalmology       Meds/Allergies:    all medications and allergies reviewed    Allergies: No Known Allergies    Social History:     Marital Status:     Substance Use History:   Social History     Substance and Sexual Activity   Alcohol Use Yes    Comment: Only on special occasions     Social History     Tobacco Use   Smoking Status Never   Smokeless Tobacco Never     Social History     Substance and Sexual Activity   Drug Use Never       Family History:    Family History   Problem Relation Age of Onset    Diabetes Mother     Coronary artery disease Mother     Arthritis Mother     Hypertension Mother     Hypertension Father     Diabetes Brother     Diabetes Son     Arthritis Family        Physical Exam:     Vitals:   Blood Pressure: 131/63 (24 0808)  Pulse: 90 (24 0808)  Temperature: 97.6 °F (36.4 °C) (24 0808)  Temp Source: Temporal (24 08)  Respirations: 16 (24 0808)  Height: 5' (152.4 cm) (24)  Weight - Scale: 67.5 kg (148 lb 12.8 oz) (24)  SpO2: 93  % (03/27/24 0808)    Physical Exam  Vitals and nursing note reviewed.   Constitutional:       General: She is not in acute distress.     Appearance: She is obese. She is not toxic-appearing or diaphoretic.      Comments: Resting in bed on room air    HENT:      Head: Normocephalic.      Mouth/Throat:      Mouth: Mucous membranes are moist.   Eyes:      Conjunctiva/sclera: Conjunctivae normal.   Cardiovascular:      Rate and Rhythm: Normal rate. Rhythm irregular.   Pulmonary:      Effort: Pulmonary effort is normal.      Breath sounds: Normal breath sounds. No wheezing or rales.   Abdominal:      General: Bowel sounds are normal. There is no distension.      Palpations: Abdomen is soft.      Tenderness: There is no abdominal tenderness.   Musculoskeletal:         General: Normal range of motion.      Cervical back: Normal range of motion.      Right lower leg: Edema (trace) present.      Left lower leg: Edema (trace) present.   Skin:     General: Skin is warm and dry.      Capillary Refill: Capillary refill takes less than 2 seconds.   Neurological:      Mental Status: She is alert and oriented to person, place, and time. Mental status is at baseline.   Psychiatric:         Mood and Affect: Mood is depressed.         Speech: Speech normal.         Behavior: Behavior is cooperative.         Additional Data:     Lab Results:     Results from last 7 days   Lab Units 03/27/24  0551 03/23/24  0501 03/22/24  1826   WBC Thousand/uL 9.30   < > 9.75   HEMOGLOBIN g/dL 13.1   < > 12.3   HEMATOCRIT % 38.8   < > 39.4   PLATELETS Thousands/uL 214   < > 173   BANDS PCT %  --   --  2   NEUTROS PCT % 81*  --   --    LYMPHS PCT % 9*  --   --    LYMPHO PCT %  --   --  8*   MONOS PCT % 9  --   --    MONO PCT %  --   --  4   EOS PCT % 1  --  0    < > = values in this interval not displayed.     Results from last 7 days   Lab Units 03/27/24  0551   SODIUM mmol/L 141   POTASSIUM mmol/L 4.2   CHLORIDE mmol/L 100   CO2 mmol/L 32   BUN mg/dL  31*   CREATININE mg/dL 1.12   ANION GAP mmol/L 9   CALCIUM mg/dL 9.4   ALBUMIN g/dL 3.8   TOTAL BILIRUBIN mg/dL 0.77   ALK PHOS U/L 72   ALT U/L 9   AST U/L 10*   GLUCOSE RANDOM mg/dL 115             Lab Results   Component Value Date/Time    HGBA1C 6.0 (H) 12/07/2023 07:13 AM    HGBA1C 5.6 12/27/2022 07:14 AM    HGBA1C 5.8 (H) 06/24/2022 07:10 AM     Results from last 7 days   Lab Units 03/22/24  1808   POC GLUCOSE mg/dl 130     Results from last 7 days   Lab Units 03/22/24  1826   LACTIC ACID mmol/L 1.5       Imaging: I have personally reviewed pertinent reports.      No orders to display       EKG, Pathology, and Other Studies Reviewed on Admission:   EKG: A-fib, right bundle branch block, T wave abnormality.  Compared to the EKG from 3/22, no significant changes    ** Please Note: This note has been constructed using a voice recognition system. **

## 2024-03-27 NOTE — ASSESSMENT & PLAN NOTE
BP stable  Continue home losartan  Hold Bumex and HCTZ for 1 day given slight increase in BUN and concern for dehydration

## 2024-03-27 NOTE — NURSING NOTE
"Patient arrived on a 201 from [place] ED. Per report patient had intentionally overdosed due to being triggered by a  that reminded her of her . Patient reports \" I wouldn't do it again I know I was wrong and now its between me and god\". Patient reports depression due to husbands passing, offered information about  spouse and life together and the kind of person he was. Patient given emotional support. Denies all other psych s/s. Stated she doesn't feel safe here at the moment. Patient was encouraged to express emotions and inform staff of any needs and was reassured. Patient is medication compliant and bright on approach. PT evaluation was put in due to unsteady gait. Patient is cooperative with care. Safety precautions initiated.  "

## 2024-03-27 NOTE — TREATMENT PLAN
TREATMENT PLAN REVIEW - Behavioral Health Rosalind Wise 83 y.o. 1940 female MRN: [id number]    [address] Room / Bed: [facility] 607/[facility] 607-02 Encounter: [id number]          Admit Date/Time:  3/26/2024  9:06 PM    Treatment Team:   Stephen A Prayson, DO Jaleal McGill Yannesa Rosa Rivera, LPN Kimberly Deangelis Brianna Koch Shernett Rose Terry L Trittenbach, COTA Denielle Deily, RN Michael Apgar Karissa Marie Kormandy, CRNP    Diagnosis: Active Problems:  There are no active Hospital Problems.      Patient Strengths/Assets: cooperative, family ties, [employer] affiliation    Patient Barriers/Limitations: low self esteem, poor physical health    Short Term Goals: decrease in depressive symptoms, decrease in anxiety symptoms, decrease in suicidal thoughts    Long Term Goals: improvement in depression, improvement in anxiety, free of suicidal thoughts    Progress Towards Goals: starting psychiatric medications as prescribed, improving    Recommended Treatment: medication management, patient medication education, group therapy, milieu therapy, continued Behavioral Health psychiatric evaluation/assessment process    Treatment Frequency: daily medication monitoring, group and milieu therapy daily, monitoring through interdisciplinary rounds, monitoring through weekly patient care conferences    Expected Discharge Date:  4/3/24    Discharge Plan: discharge to home    Treatment Plan Created/Updated By: Abundio Guerrero MD

## 2024-03-27 NOTE — PROGRESS NOTES
24 0830   Team Meeting   Meeting Type Daily Rounds   Initial Conference Date 24   Team Members Present   Team Members Present Physician;Nurse;;Occupational Therapist   Physician Team Member Yolanda/Arnold   Nursing Team Member Jeni   Care Management Team Member Beto CURTIS Team Member Luis   Patient/Family Present   Patient Present No   Patient's Family Present No     Patient is a 201 who was brought here after an intentional overdose on medications. She was found on the floor incontinent of stool on the floor. She recently attended a  with an old friend. She slept well and is cooperative with care so far. Discharge is pending.

## 2024-03-27 NOTE — PLAN OF CARE
Problem: Alteration in Thoughts and Perception  Goal: Verbalize thoughts and feelings  Description: Interventions:  - Promote a nonjudgmental and trusting relationship with the patient through active listening and therapeutic communication  - Assess patient's level of functioning, behavior and potential for risk  - Engage patient in 1 on 1 interactions  - Encourage patient to express fears, feelings, frustrations, and discuss symptoms    - [place] patient to reality, help patient recognize reality-based thinking   - Administer medications as ordered and assess for potential side effects  - Provide the patient education related to the signs and symptoms of the illness and desired effects of prescribed medications  Outcome: Not Progressing  Goal: Refrain from acting on delusional thinking/internal stimuli  Description: Interventions:  - Monitor patient closely, per order   - Utilize least restrictive measures   - Set reasonable limits, give positive feedback for acceptable   - Administer medications as ordered and monitor of potential side effects  Outcome: Not Progressing  Goal: Agree to be compliant with medication regime, as prescribed and report medication side effects  Description: Interventions:  - Offer appropriate PRN medication and supervise ingestion; conduct AIMS, as needed   Outcome: Not Progressing

## 2024-03-27 NOTE — TREATMENT TEAM
03/26/24 2230   Provider Notification   Reason for Communication Admission   Provider Name Humera Hoyt   Provider Role Hospitalist   Method of Communication Other (Comment)  (TT)   Response No new orders   Notification Time 2230     Contacted provider regarding past DNR/DNI order. Awaiting response.

## 2024-03-27 NOTE — SOCIAL WORK
Rayo completed call with son Everett and his wife. Son is very aware of the seriuousness of the attempt by mother, but would like for her to come home sooner rather than later. He states that she has some friends that she sees a couple of times a month and that they were going to encourage those friends to come over more. They have safe guarded her medicaitons and have also committed to both being there to help keep an eye on her. They fear being in the hospital may make her worse. Patient is very against psychiatry, medication, and therapy. They stated that she will not even use the word depressed but rather say she is sad. CM shared that she was also able to name protective features like her children and her grandson. Cm will speak to team about options. Son asked Cm to offer resources at the time of her discharge in order to continue to try and persuade her to see a therapist but he does not think she will go for it.     Everett Wise (Son) [phone] (M)

## 2024-03-27 NOTE — NURSING NOTE
Patient is calm, depressed affect, and withdrawn to self. Patient is medication compliant, pleasant on approach. Patient denies SI, AH, VH at the moment. Safety checks maintained.

## 2024-03-27 NOTE — ASSESSMENT & PLAN NOTE
Lab Results   Component Value Date    EGFR 45 03/27/2024    EGFR 60 03/26/2024    EGFR 54 03/25/2024    CREATININE 1.12 03/27/2024    CREATININE 0.88 03/26/2024    CREATININE 0.96 03/25/2024   Cr near baseline  Avoid nephrotoxic agents, hypotension, contrast dye

## 2024-03-27 NOTE — ASSESSMENT & PLAN NOTE
Patient is medically cleared for admission to [facility] and treatment of underlying psychiatric illness based on available results  Admission labs reviewed, CBC, CMP, lipid panel, TSH, acceptable   BUN elevated, will hold Bumex and HCTZ for 1 day   Vitals stable   EKG reveals controlled AFib with T wave abnormalities, 86 bpm 490 QTC   Will repeat an EKG tomorrow morning for comparison  Currently, chest pain free. No shortness of breath or diaphoresis.  Will need outpatient follow-up for abnormal EKG  Medically stable for continued inpatient psychiatric treatment based on available results  Please contact [facility] with any questions or concerns

## 2024-03-27 NOTE — ASSESSMENT & PLAN NOTE
Wt Readings from Last 3 Encounters:   03/26/24 67.5 kg (148 lb 12.8 oz)   03/26/24 71 kg (156 lb 8.4 oz)   02/27/24 70.3 kg (155 lb)     Appears euvolemic. Weight improved.   Will hold Bumex and HCTZ for 1 day given rise in BUN and concern for dehydration   Continue BB and losartan

## 2024-03-27 NOTE — ASSESSMENT & PLAN NOTE
Presented to [facility] after intentional overdose on her prescribed medications. Patient was evaluated by psychiatry and recommended inpatient psychiatric treatment.   Admitted to [facility] at [facility]   Management per primary service

## 2024-03-28 PROCEDURE — 99232 SBSQ HOSP IP/OBS MODERATE 35: CPT | Performed by: STUDENT IN AN ORGANIZED HEALTH CARE EDUCATION/TRAINING PROGRAM

## 2024-03-28 RX ORDER — POLYETHYLENE GLYCOL 3350 17 G/17G
17 POWDER, FOR SOLUTION ORAL DAILY PRN
Status: DISCONTINUED | OUTPATIENT
Start: 2024-03-28 | End: 2024-03-29 | Stop reason: HOSPADM

## 2024-03-28 RX ORDER — BISACODYL 10 MG
10 SUPPOSITORY, RECTAL RECTAL DAILY PRN
Status: DISCONTINUED | OUTPATIENT
Start: 2024-03-28 | End: 2024-03-29 | Stop reason: HOSPADM

## 2024-03-28 RX ORDER — MELATONIN
2000 DAILY
Status: DISCONTINUED | OUTPATIENT
Start: 2024-03-28 | End: 2024-03-29 | Stop reason: HOSPADM

## 2024-03-28 RX ADMIN — Medication 400 MG: at 08:37

## 2024-03-28 RX ADMIN — APIXABAN 2.5 MG: 2.5 TABLET, FILM COATED ORAL at 08:37

## 2024-03-28 RX ADMIN — ESCITALOPRAM 5 MG: 5 TABLET, FILM COATED ORAL at 08:37

## 2024-03-28 RX ADMIN — DICLOFENAC SODIUM 2 G: 10 GEL TOPICAL at 08:38

## 2024-03-28 RX ADMIN — DICLOFENAC SODIUM 2 G: 10 GEL TOPICAL at 21:13

## 2024-03-28 RX ADMIN — PANTOPRAZOLE SODIUM 20 MG: 20 TABLET, DELAYED RELEASE ORAL at 08:37

## 2024-03-28 RX ADMIN — ATORVASTATIN CALCIUM 40 MG: 40 TABLET, FILM COATED ORAL at 17:00

## 2024-03-28 RX ADMIN — METOPROLOL TARTRATE 25 MG: 25 TABLET, FILM COATED ORAL at 08:37

## 2024-03-28 RX ADMIN — LOSARTAN POTASSIUM 25 MG: 25 TABLET, FILM COATED ORAL at 08:37

## 2024-03-28 RX ADMIN — LEVOTHYROXINE SODIUM 88 MCG: 88 TABLET ORAL at 06:01

## 2024-03-28 RX ADMIN — MELATONIN TAB 3 MG 3 MG: 3 TAB at 21:12

## 2024-03-28 RX ADMIN — METOPROLOL TARTRATE 25 MG: 25 TABLET, FILM COATED ORAL at 21:12

## 2024-03-28 RX ADMIN — Medication 400 MG: at 17:00

## 2024-03-28 RX ADMIN — APIXABAN 2.5 MG: 2.5 TABLET, FILM COATED ORAL at 17:00

## 2024-03-28 RX ADMIN — SENNOSIDES AND DOCUSATE SODIUM 1 TABLET: 8.6; 5 TABLET ORAL at 08:37

## 2024-03-28 RX ADMIN — DICLOFENAC SODIUM 2 G: 10 GEL TOPICAL at 12:23

## 2024-03-28 RX ADMIN — Medication 2000 UNITS: at 13:33

## 2024-03-28 NOTE — PROGRESS NOTES
03/28/24 0835   Team Meeting   Meeting Type Daily Rounds   Initial Conference Date 03/28/24   Team Members Present   Team Members Present Physician;Nurse;;Occupational Therapist   Physician Team Member Yolanda/Arnold   Nursing Team Member Jeni   Care Management Team Member Beto CURTIS Team Member Luis   Patient/Family Present   Patient Present No   Patient's Family Present No     Patient is depressed and withdrawn. She is possible for discharge tomorrow. She is cooperative with care. Eating 25% of her meals. Discharge is pending.

## 2024-03-28 NOTE — PLAN OF CARE
Problem: Alteration in Thoughts and Perception  Goal: Verbalize thoughts and feelings  Description: Interventions:  - Promote a nonjudgmental and trusting relationship with the patient through active listening and therapeutic communication  - Assess patient's level of functioning, behavior and potential for risk  - Engage patient in 1 on 1 interactions  - Encourage patient to express fears, feelings, frustrations, and discuss symptoms    - [place] patient to reality, help patient recognize reality-based thinking   - Administer medications as ordered and assess for potential side effects  - Provide the patient education related to the signs and symptoms of the illness and desired effects of prescribed medications  Outcome: Progressing  Goal: Refrain from acting on delusional thinking/internal stimuli  Description: Interventions:  - Monitor patient closely, per order   - Utilize least restrictive measures   - Set reasonable limits, give positive feedback for acceptable   - Administer medications as ordered and monitor of potential side effects  Outcome: Progressing  Goal: Agree to be compliant with medication regime, as prescribed and report medication side effects  Description: Interventions:  - Offer appropriate PRN medication and supervise ingestion; conduct AIMS, as needed   Outcome: Progressing     Problem: Ineffective Coping  Goal: Demonstrates healthy coping skills  Outcome: Progressing     Problem: Risk for Self Injury/Neglect  Goal: Refrain from harming self  Description: Interventions:  - Monitor patient closely, per order  - Develop a trusting relationship  - Supervise medication ingestion, monitor effects and side effects   Outcome: Progressing     Problem: Depression  Goal: Refrain from isolation  Description: Interventions:  - Develop a trusting relationship   - Encourage socialization   Outcome: Progressing

## 2024-03-28 NOTE — NURSING NOTE
Patient is bright approach, remains depressed, and sad d/t hospitalization. Patient is visible in the milieu, medication compliant, and cooperative. Patient offers no c/o at the moment. Safety checks ongoing.

## 2024-03-28 NOTE — SOCIAL WORK
Cm returned call for son Everett. Cm let them know that the doctor was most likely comforatable with a discharge for tomorrow barring something unforseen. Son would like to pick her up once a determination is made.         Everett Wise (Son) [phone] (M)

## 2024-03-28 NOTE — PROGRESS NOTES
Progress Note - Behavioral Health   Rosalind Wise 83 y.o. female MRN: [id number]  Unit/Bed#: [facility] 607-02 Encounter: [id number]    Assessment/Plan   Principal Problem:    Depression  Active Problems:    Benign essential hypertension    Chronic diastolic (congestive) heart failure (HCC)    Hypothyroidism    CKD (chronic kidney disease), stage III (HCC)    Hyperlipemia    WENDI (obstructive sleep apnea)    Gastroesophageal reflux disease without esophagitis    Medical clearance for psychiatric admission    Prediabetes    Paroxysmal atrial fibrillation (HCC)    Intentional overdose (HCC)    Hypomagnesemia    Treatment Plan  -Continue Lexapro 5mg Daily    Behavior over the last 24 hours:  improved  Sleep: normal  Appetite: normal  Medication side effects: No  ROS: no complaints    Subjective: Patient attending groups well. On interview, patient reflected on her feelings in the household. Endorses feeling lonely and neglected. States that she had thoughts that it would be better if she just disappeared as everyone is just doing their own thing. Notably tearful about these thoughts, but able to express the well. States she'll never attempt suicide again and makes a pact to God for that promise. Discussed family's plan to help patient connect with her other friends and engage her more frequently. Encouraged patient to consider therapy.      Mental Status Evaluation:  Appearance:  age appropriate   Behavior:  normal   Speech:  normal volume   Mood:  euthymic   Affect:  labile, mood-incongruent, and Tearful   Thought Process:  normal   Associations: intact associations   Thought Content:  normal   Perceptual Disturbances: None   Risk Potential: Suicidal Ideations none  Homicidal Ideations none  Potential for Aggression No   Sensorium:  person, place, time/date, and situation   Memory:  recent and remote memory grossly intact   Consciousness:  alert and awake    Attention: attention span and concentration were age appropriate    Insight:  fair   Judgment: fair   Gait/Station: Uses a walker   Motor Activity: no abnormal movements     Progress Toward Goals: Improving    Recommended Treatment: Continue with group therapy, milieu therapy and occupational therapy.      Risks, benefits and possible side effects of Medications:   Risks, benefits, and possible side effects of medications explained to patient and patient verbalizes understanding.      Medications: current meds:   Current Facility-Administered Medications   Medication Dose Route Frequency    acetaminophen (TYLENOL) tablet 650 mg  650 mg Oral Q4H PRN    acetaminophen (TYLENOL) tablet 650 mg  650 mg Oral Q4H PRN    acetaminophen (TYLENOL) tablet 975 mg  975 mg Oral Q6H PRN    aluminum-magnesium hydroxide-simethicone (MAALOX) oral suspension 30 mL  30 mL Oral Q4H PRN    apixaban (ELIQUIS) tablet 2.5 mg  2.5 mg Oral BID    Artificial Tears ophthalmic solution 1 drop  1 drop Both Eyes Q3H PRN    atorvastatin (LIPITOR) tablet 40 mg  40 mg Oral QPM    benztropine (COGENTIN) injection 1 mg  1 mg Intramuscular Q4H PRN Max 6/day    benztropine (COGENTIN) tablet 0.5 mg  0.5 mg Oral Q4H PRN Max 6/day    bisacodyl (DULCOLAX) rectal suppository 10 mg  10 mg Rectal Daily PRN    [START ON 3/29/2024] bumetanide (BUMEX) tablet 1 mg  1 mg Oral Daily    cholecalciferol (VITAMIN D3) tablet 2,000 Units  2,000 Units Oral Daily    Diclofenac Sodium (VOLTAREN) 1 % topical gel 2 g  2 g Topical 4x Daily    escitalopram (LEXAPRO) tablet 5 mg  5 mg Oral Daily    hydrOXYzine HCL (ATARAX) tablet 25 mg  25 mg Oral Q6H PRN Max 4/day    hydrOXYzine HCL (ATARAX) tablet 50 mg  50 mg Oral Q6H PRN Max 4/day    levothyroxine tablet 88 mcg  88 mcg Oral Daily    LORazepam (ATIVAN) injection 1 mg  1 mg Intramuscular Q6H PRN Max 3/day    LORazepam (ATIVAN) tablet 1 mg  1 mg Oral Q6H PRN Max 3/day    losartan (COZAAR) tablet 25 mg  25 mg Oral Daily    magnesium Oxide (MAG-OX) tablet 400 mg  400 mg Oral BID    melatonin  tablet 3 mg  3 mg Oral HS    metoprolol tartrate (LOPRESSOR) tablet 25 mg  25 mg Oral BID    OLANZapine (ZyPREXA) IM injection 5 mg  5 mg Intramuscular Q3H PRN Max 3/day    OLANZapine (ZyPREXA) tablet 2.5 mg  2.5 mg Oral Q4H PRN Max 6/day    OLANZapine (ZyPREXA) tablet 5 mg  5 mg Oral Q4H PRN Max 3/day    OLANZapine (ZyPREXA) tablet 5 mg  5 mg Oral Q3H PRN Max 3/day    pantoprazole (PROTONIX) EC tablet 20 mg  20 mg Oral Daily    polyethylene glycol (MIRALAX) packet 17 g  17 g Oral Daily PRN    propranolol (INDERAL) tablet 5 mg  5 mg Oral Q8H PRN    senna-docusate sodium (SENOKOT S) 8.6-50 mg per tablet 1 tablet  1 tablet Oral Daily    senna-docusate sodium (SENOKOT S) 8.6-50 mg per tablet 1 tablet  1 tablet Oral Daily PRN    [START ON 3/29/2024] spironolactone (ALDACTONE) tablet 12.5 mg  12.5 mg Oral Daily    traZODone (DESYREL) tablet 50 mg  50 mg Oral HS PRN   .    Labs: I have personally reviewed all pertinent laboratory/tests results. Most Recent Labs:   Lab Results   Component Value Date    WBC 9.30 03/27/2024    RBC 4.38 03/27/2024    HGB 13.1 03/27/2024    HCT 38.8 03/27/2024     03/27/2024    RDW 14.4 03/27/2024    NEUTROABS 7.44 03/27/2024    SODIUM 141 03/27/2024    K 4.2 03/27/2024     03/27/2024    CO2 32 03/27/2024    BUN 31 (H) 03/27/2024    CREATININE 1.12 03/27/2024    GLUC 115 03/27/2024    GLUF 115 (H) 03/27/2024    CALCIUM 9.4 03/27/2024    AST 10 (L) 03/27/2024    ALT 9 03/27/2024    ALKPHOS 72 03/27/2024    TP 6.6 03/27/2024    ALB 3.8 03/27/2024    TBILI 0.77 03/27/2024    CHOLESTEROL 105 03/27/2024    HDL 54 03/27/2024    TRIG 78 03/27/2024    LDLCALC 35 03/27/2024    NONHDLC 51 03/27/2024    AMMONIA 15 (L) 03/22/2024    [id number] 2.103 03/27/2024    FREET4 1.50 (H) 08/14/2020    HGBA1C 6.0 (H) 03/27/2024     03/27/2024

## 2024-03-29 VITALS
RESPIRATION RATE: 16 BRPM | TEMPERATURE: 97.7 F | OXYGEN SATURATION: 95 % | HEART RATE: 89 BPM | SYSTOLIC BLOOD PRESSURE: 124 MMHG | BODY MASS INDEX: 29.65 KG/M2 | DIASTOLIC BLOOD PRESSURE: 64 MMHG | WEIGHT: 151.01 LBS | HEIGHT: 60 IN

## 2024-03-29 PROCEDURE — 99238 HOSP IP/OBS DSCHRG MGMT 30/<: CPT | Performed by: STUDENT IN AN ORGANIZED HEALTH CARE EDUCATION/TRAINING PROGRAM

## 2024-03-29 RX ORDER — ESCITALOPRAM OXALATE 5 MG/1
5 TABLET ORAL DAILY
Qty: 30 TABLET | Refills: 0 | Status: SHIPPED | OUTPATIENT
Start: 2024-03-30 | End: 2024-04-29

## 2024-03-29 RX ADMIN — PANTOPRAZOLE SODIUM 20 MG: 20 TABLET, DELAYED RELEASE ORAL at 08:19

## 2024-03-29 RX ADMIN — ESCITALOPRAM 5 MG: 5 TABLET, FILM COATED ORAL at 08:19

## 2024-03-29 RX ADMIN — SPIRONOLACTONE 12.5 MG: 25 TABLET, FILM COATED ORAL at 08:19

## 2024-03-29 RX ADMIN — SENNOSIDES AND DOCUSATE SODIUM 1 TABLET: 8.6; 5 TABLET ORAL at 08:20

## 2024-03-29 RX ADMIN — LEVOTHYROXINE SODIUM 88 MCG: 88 TABLET ORAL at 06:12

## 2024-03-29 RX ADMIN — Medication 2000 UNITS: at 08:19

## 2024-03-29 RX ADMIN — DICLOFENAC SODIUM 2 G: 10 GEL TOPICAL at 12:04

## 2024-03-29 RX ADMIN — BUMETANIDE 1 MG: 1 TABLET ORAL at 08:19

## 2024-03-29 RX ADMIN — METOPROLOL TARTRATE 25 MG: 25 TABLET, FILM COATED ORAL at 08:20

## 2024-03-29 RX ADMIN — DICLOFENAC SODIUM 2 G: 10 GEL TOPICAL at 08:23

## 2024-03-29 RX ADMIN — Medication 400 MG: at 08:19

## 2024-03-29 RX ADMIN — APIXABAN 2.5 MG: 2.5 TABLET, FILM COATED ORAL at 08:20

## 2024-03-29 RX ADMIN — LOSARTAN POTASSIUM 25 MG: 25 TABLET, FILM COATED ORAL at 08:19

## 2024-03-29 RX ADMIN — POLYETHYLENE GLYCOL 3350 17 G: 17 POWDER, FOR SOLUTION ORAL at 14:04

## 2024-03-29 NOTE — PROGRESS NOTES
03/29/24 0830   Team Meeting   Meeting Type Daily Rounds   Initial Conference Date 03/29/24   Team Members Present   Team Members Present Physician;Nurse;;Occupational Therapist   Physician Team Member Yolanda/Arnold   Nursing Team Member Jeni   Care Management Team Member Beto CURTIS Team Member Luis   Patient/Family Present   Patient Present No   Patient's Family Present No     Patient is calm and cooperative with care. She is going to discharge at 2pm.

## 2024-03-29 NOTE — PROGRESS NOTES
03/29/24 1000   Activity/Group Checklist   Group Community meeting  (seated Exercise trivia challenge)   Attendance Attended   Attendance Duration (min) 31-45   Interactions Interacted appropriately  (Pt. told peers that she will never come back again.)   Affect/Mood Appropriate   Goals Achieved Able to engage in interactions;Discussed coping strategies

## 2024-03-29 NOTE — PLAN OF CARE
Problem: Alteration in Thoughts and Perception  Goal: Verbalize thoughts and feelings  Description: Interventions:  - Promote a nonjudgmental and trusting relationship with the patient through active listening and therapeutic communication  - Assess patient's level of functioning, behavior and potential for risk  - Engage patient in 1 on 1 interactions  - Encourage patient to express fears, feelings, frustrations, and discuss symptoms    - [place] patient to reality, help patient recognize reality-based thinking   - Administer medications as ordered and assess for potential side effects  - Provide the patient education related to the signs and symptoms of the illness and desired effects of prescribed medications  Outcome: Adequate for Discharge  Goal: Refrain from acting on delusional thinking/internal stimuli  Description: Interventions:  - Monitor patient closely, per order   - Utilize least restrictive measures   - Set reasonable limits, give positive feedback for acceptable   - Administer medications as ordered and monitor of potential side effects  Outcome: Adequate for Discharge  Goal: Agree to be compliant with medication regime, as prescribed and report medication side effects  Description: Interventions:  - Offer appropriate PRN medication and supervise ingestion; conduct AIMS, as needed   Outcome: Adequate for Discharge     Problem: Ineffective Coping  Goal: Demonstrates healthy coping skills  Outcome: Adequate for Discharge     Problem: Risk for Self Injury/Neglect  Goal: Refrain from harming self  Description: Interventions:  - Monitor patient closely, per order  - Develop a trusting relationship  - Supervise medication ingestion, monitor effects and side effects   Outcome: Adequate for Discharge     Problem: Depression  Goal: Refrain from harming self  Description: Interventions:  - Monitor patient closely, per order   - Supervise medication ingestion, monitor effects and side effects   Outcome:  Adequate for Discharge  Goal: Refrain from isolation  Description: Interventions:  - Develop a trusting relationship   - Encourage socialization   Outcome: Adequate for Discharge  Goal: Refrain from self-neglect  Outcome: Adequate for Discharge     Problem: Anxiety  Goal: Anxiety is at manageable level  Description: Interventions:  - Assess and monitor patient's anxiety level.   - Monitor for signs and symptoms (heart palpitations, chest pain, shortness of breath, headaches, nausea, feeling jumpy, restlessness, irritable, apprehensive).   - Collaborate with interdisciplinary team and initiate plan and interventions as ordered.  - [place] patient to unit/surroundings  - Explain treatment plan  - Encourage participation in care  - Encourage verbalization of concerns/fears  - Identify coping mechanisms  - Assist in developing anxiety-reducing skills  - Administer/offer alternative therapies  - Limit or eliminate stimulants  Outcome: Adequate for Discharge     Problem: Risk for Violence/Aggression Toward Others  Goal: Refrain from harming others  Outcome: Adequate for Discharge     Problem: Alteration in Orientation  Goal: Express concerns related to confused thinking related to:  Description: Interventions:  - Encourage patient to express feelings, fears, frustrations, hopes  - Assign consistent caregivers   - [place]/re-orient patient as needed  - Allow comfort items, as appropriate  - Provide visual cues, signs, etc.   Outcome: Adequate for Discharge  Goal: Allow medical examinations, as recommended  Description: Interventions:  - Provide physical/neurological exams and/or referrals, per provider   Outcome: Adequate for Discharge

## 2024-03-29 NOTE — PROGRESS NOTES
03/29/24 1000 03/29/24 1050 03/29/24 1300   Activity/Group Checklist   Group Community meeting  (seated Exercise trivia challenge) Admission/Discharge  (relapse prevention plan not completed due to pt. disinterest in making one.) Wellness  ([place] meditation DVD)   Attendance Attended  --  Attended   Attendance Duration (min) 31-45  --  16-30   Interactions Interacted appropriately  (Pt. told peers that she will never come back again.)  --  Other (Comment)  (pt. smiling  and reminisced about her beloved [place] and home near the [place] before coming to [place].)   Affect/Mood Appropriate  --  Appropriate;Normal range   Goals Achieved Able to engage in interactions;Discussed coping strategies  --  Able to listen to others;Able to engage in interactions;Able to self-disclose

## 2024-03-29 NOTE — NURSING NOTE
PRN Miralax given for constipation. Patient is visible in the milieu, calm, social, and bright approach. Patient denies SI, AH, VH upon D/C. Patient reports no anxiety and no depression, expressing remorse of what she did. No behavioral issues noted. Medication and F/U instruction reviewed with family members, and they verbalized understanding. Patient left unit with all belongings to lobby.

## 2024-03-29 NOTE — DISCHARGE INSTR - OTHER ORDERS
If you are experiencing a mental health emergency, you may call the [facility] Crisis Intervention Office 24 hours a day, 7 days per week at [phone].     In [facility], call [phone].     When you need someone to listen, the Warmline is available for 16 hours a day, 7 days a week, from the time of 7-10am and 2pm-2am.  It is not available from the hours of 2am-6am and 10am-2pm. A representative can be reached at [phone].     Resources for Outpatient Therapists have been added to your blue discharge folder.

## 2024-03-29 NOTE — DISCHARGE INSTR - APPOINTMENTS
Jammie, or Yajaira, our Behavioral Health Nurse Navigators, will be calling you after your discharge, on the phone number that you provided.  They will be available as an additional support, if needed.   If you wish to speak with one of them, you may contact Jammie at [phone] or Yajaira at [phone].   LOV 4/8/21    - Other specified hypothyroidism  Labs reviewed with patient, they are stable.  Continue current medications and treatment.  Will repeat labs in 6 months.     Next scheduled appt 10/8/21   Last TSH    3/29/21   Last refill    9/16/20         Levothyroxine 137 mcg       Refill placed, per protocol

## 2024-03-29 NOTE — DISCHARGE SUMMARY
Discharge Summary - [facility]   Rosalind Wise 83 y.o. female MRN: [id number]  Unit/Bed#: [facility] 607-02 Encounter: [id number]     Admission Date:   Admission Orders (From admission, onward)       Ordered        03/26/24 2119  ED TO DIFFERENT CAMPUS [facility] UNIT or INPATIENT MEDICAL UNIT to [facility] UNIT (using Discharge Readmit Navigator) - Admit Patient to IP Behavioral Health Unit  Once                                Discharge Date: 3/29/24    Attending Psychiatrist: Dr. Abundio Guerrero       Medication List        START taking these medications      escitalopram 5 mg tablet  Commonly known as: LEXAPRO  Take 1 tablet (5 mg total) by mouth daily  Start taking on: March 30, 2024     senna-docusate sodium 8.6-50 mg per tablet  Commonly known as: SENOKOT S  Take 1 tablet by mouth daily     spironolactone 25 mg tablet  Commonly known as: ALDACTONE  Take 0.5 tablets (12.5 mg total) by mouth daily            CONTINUE taking these medications      apixaban 2.5 mg  Commonly known as: Eliquis  Take 1 tablet (2.5 mg total) by mouth 2 (two) times a day     atorvastatin 40 mg tablet  Commonly known as: LIPITOR  Take 1 tablet (40 mg total) by mouth every evening     bumetanide 1 mg tablet  Commonly known as: BUMEX  Take 1 tablet (1 mg total) by mouth daily     Diclofenac Sodium 1 %  Commonly known as: VOLTAREN  Apply 2 g topically in the morning APPLY NO MORE THAN 3 DAYS IN A ROW THEN TAKE A BREAK FOR ONE WEEK FROM USE.     levothyroxine 88 mcg tablet  TAKE 1 TABLET EVERY DAY     losartan 25 mg tablet  Commonly known as: COZAAR  TAKE 2 TABLETS IN THE MORNING AND TAKE 1 TABLET IN THE EVENING     metoprolol tartrate 25 mg tablet  Commonly known as: LOPRESSOR  Take 0.5 tablets (12.5 mg total) by mouth every 12 (twelve) hours     pantoprazole 20 mg tablet  Commonly known as: PROTONIX  TAKE 1 TABLET EVERY DAY            STOP taking these medications      amLODIPine 10 mg tablet  Commonly known as: NORVASC     gabapentin 100 mg  capsule  Commonly known as: NEURONTIN     ketoconazole 2 % cream  Commonly known as: NIZORAL                Reason for Admission/HPI:   History of Present Illness     Patient is a 83 y.o. female presents with Signs of suicidal potential.  Patient was admitted to psychiatric unit on a voluntarily 201 commitment basis.     Primary complaints include: depression worse.  Onset of symptoms was gradual starting 1 year ago with gradually worsening course since that time. Psychosocial Stressors: health, marital, and Grief.     Per H&P Note by Dr. Perdomo on 3/22/24  Rosalind Wise is a 83 y.o. female with a PMH of CHF, A-fib, CKD stage III, hypothyroid, hyperlipidemia, GERD, sciatica, prediabetes who presents with intentional overdose.     Patient presents secondary to intentional drug overdose.  Patient reports she has been feeling upset since the passing of her .  She attempted to kill herself by ingesting medications which she is unable to recall names of them.  Per ED note she ingested Zofran, hydralazine, Benadryl, and Tylenol PM.  Family found her drowsy prior to calling 911.  She reports that she has never been on medication for anxiety or depression.  She reports history of suicidal ideations but is never acted on it.  She denies any homicidal ideations, visual hallucinations, or auditory hallucinations.  She denies any illicit drug use, tobacco use, or alcohol use.     Patient was admitted to medicine for treamtent for overdose. Per Discharge Summary by Dr. Gilliam on 3/26/24 hospital course is as follows:      Rosalind Wise is a 83 y.o. female patient who originally presented to the hospital on 3/22/2024 due to  intentional overdose with suicidal ideations.  Patient ingested unknown amounts of Zofran, Atarax, Plavix, Benadryl, Tylenol cold and flu tablets.  Acetaminophen level was elevated 26 and in the ED, case was discussed with toxicology who recommended supportive care and NAC therapy.   "Patient had signs of anticholinergic effects.  Patient was eventually placed on one-to-one for suicidal ideations although she denies any history of SI.  She reports feeling depressed since her  had passed away and soon began regretting her attempt.  Psychiatry was consulted who saw her yesterday and recommended a 201 which the patient agreed to.  Patient will be transferred to acute behavioral health facility.  Furthermore, patient was noted to be A-fib with RVR and was started on a Cardizem drip.  Patient was eventually maintained on metoprolol and Eliquis twice daily.  Cardiology was consulted who recommended to maintain these medications.  Otherwise, patient has been medically stable and can be transferred to acute behavioral health facility.         On interview, patient is notably a poor historian. Stated that she was feeling down and felt like she was a burden to her family. States she took a lot of pills because she thought that she was too much to care for in the house due to her heart problems. States she had Pentecostal thoughts of \"God doesn't want me\" and \"God please forgive me, I don't want to live anymore.\" States it was impulsive. Denied history of suicidal thoughts or suicide attempt in the past.      Patient states that she had a stroke last year in February and after that, she had worse depression. States it was after her  passed away and she misses him. States she was alone in the house at that time and always felt anxious when she was alone. Endorse worse anxiety with her  passing away. States she was provided pills to take for sleep as she was having trouble sleeping. Does not remember what the medication is. Endorses getting confused from time to time. Denies SI/Hi/AVH currently.        Hospital Course:   Patient arrived on the unit in table medical condition. Patient was notably tearful and regretful of her actions. Patient with Orthodoxy as a strong coping mechanism. " Patient started on Lexapro 5mg daily. Patient attended groups and engaged appropriately with peers. Patient's family made arrangements to ensure patient has better engagement with others and that she no longer has access to medications. Patient endorsed desire to go home. Endorsed feeling better. On day of discharge. Patient denied SI/HI/AVH.     Mental Status at time of Discharge:     Appearance:  age appropriate   Behavior:  normal   Speech:  normal pitch and normal volume   Mood:  euthymic   Affect:  mood-congruent   Thought Process:  normal   Associations: intact associations   Thought Content:  normal   Perceptual Disturbances: None   Risk Potential: Suicidal Ideations none   Sensorium:  person, place, time/date, and situation   Cognition:  recent and remote memory grossly intact   Consciousness:  alert and awake    Attention: attention span and concentration were age appropriate   Insight:  fair   Judgment: fair   Gait/Station: normal gait/station   Motor Activity: no abnormal movements       Discharge Diagnosis:   Depression    Medical Problems       Resolved Problems  Date Reviewed: 3/27/2024   None             Discharge Medications:  See after visit summary for reconciled discharge medications provided to patient and family.      Discharge instructions/Information to patient and family:   See after visit summary for information provided to patient and family.      Provisions for Follow-Up Care:  See after visit summary for information related to follow-up care and any pertinent home health orders.      Discharge Statement   I spent 20 minutes discharging the patient. This time was spent on the day of discharge. I had direct contact with the patient on the day of discharge. Additional documentation is required if more than 30 minutes were spent on discharge.

## 2024-03-29 NOTE — PLAN OF CARE
Problem: DISCHARGE PLANNING  Goal: Discharge to home or other facility with appropriate resources  Description: INTERVENTIONS:  - Identify barriers to discharge w/patient and caregiver  - Arrange for needed discharge resources and transportation as appropriate  - Identify discharge learning needs (meds, wound care, etc.)  - Arrange for interpretive services to assist at discharge as needed  - Refer to Case Management Department for coordinating discharge planning if the patient needs post-hospital services based on physician/advanced practitioner order or complex needs related to functional status, cognitive ability, or social support system  Outcome: Completed   You are being discharged to the care of your son and daughter at 2pm this afternoon. You have been scheduled with your primary care provider and your medications have been called in to your preferred pharmacy as requested. A list of resources for therapy have been added to your blue discharge folder. All instructions for medications and discharge appointments can be found in your discharge paperwork.

## 2024-03-29 NOTE — SOCIAL WORK
Cm called son Everett who will be here to pick patient up at 2pm this aftermoon. Medications can be called in to pharmacy on file as patient gets them delived to her home.     Everett Wise (Son) [phone]

## 2024-03-31 LAB
ATRIAL RATE: 131 BPM
ATRIAL RATE: 159 BPM
QRS AXIS: 109 DEGREES
QRS AXIS: 120 DEGREES
QRSD INTERVAL: 116 MS
QRSD INTERVAL: 126 MS
QT INTERVAL: 348 MS
QT INTERVAL: 360 MS
QTC INTERVAL: 517 MS
QTC INTERVAL: 529 MS
T WAVE AXIS: -35 DEGREES
T WAVE AXIS: -39 DEGREES
VENTRICULAR RATE: 130 BPM
VENTRICULAR RATE: 133 BPM

## 2024-03-31 PROCEDURE — 93010 ELECTROCARDIOGRAM REPORT: CPT | Performed by: INTERNAL MEDICINE

## 2024-04-04 ENCOUNTER — OFFICE VISIT (OUTPATIENT)
Dept: FAMILY MEDICINE CLINIC | Facility: CLINIC | Age: 84
End: 2024-04-04
Payer: MEDICARE

## 2024-04-04 VITALS
WEIGHT: 154 LBS | SYSTOLIC BLOOD PRESSURE: 130 MMHG | OXYGEN SATURATION: 98 % | BODY MASS INDEX: 30.23 KG/M2 | DIASTOLIC BLOOD PRESSURE: 78 MMHG | HEART RATE: 87 BPM | HEIGHT: 60 IN

## 2024-04-04 DIAGNOSIS — I50.33 ACUTE ON CHRONIC DIASTOLIC CONGESTIVE HEART FAILURE (HCC): ICD-10-CM

## 2024-04-04 DIAGNOSIS — F33.1 MODERATE EPISODE OF RECURRENT MAJOR DEPRESSIVE DISORDER (HCC): Primary | ICD-10-CM

## 2024-04-04 DIAGNOSIS — T14.91XA SUICIDE ATTEMPT (HCC): ICD-10-CM

## 2024-04-04 DIAGNOSIS — G47.09 OTHER INSOMNIA: ICD-10-CM

## 2024-04-04 DIAGNOSIS — I48.0 PAROXYSMAL ATRIAL FIBRILLATION (HCC): ICD-10-CM

## 2024-04-04 DIAGNOSIS — T50.902A INTENTIONAL OVERDOSE, INITIAL ENCOUNTER (HCC): ICD-10-CM

## 2024-04-04 PROCEDURE — 99496 TRANSJ CARE MGMT HIGH F2F 7D: CPT | Performed by: FAMILY MEDICINE

## 2024-04-04 RX ORDER — QUETIAPINE FUMARATE 25 MG/1
25 TABLET, FILM COATED ORAL
Qty: 30 TABLET | Refills: 0 | Status: SHIPPED | OUTPATIENT
Start: 2024-04-04

## 2024-04-04 NOTE — PROGRESS NOTES
FAMILY MEDICINE TRANSITION OF CARE OFFICE VISIT  [facility]    NAME: Rosalind Wise  AGE: 83 y.o. SEX: female  : 1940       DATE: 2024    Assessment and Plan     Acute on chronic diastolic congestive heart failure (HCC)  Wt Readings from Last 3 Encounters:   24 69.9 kg (154 lb)   24 68.5 kg (151 lb 0.2 oz)   24 71 kg (156 lb 8.4 oz)     Stable.  Continue spironolactone, losartan, bumetanide per cardiology.          Paroxysmal atrial fibrillation (HCC)  Rate controlled on metoprolol.  Continue same.    Intentional overdose (HCC)  Patient following up from recent hospital discharge after intentional overdose on her prescribed medications.  Patient evaluated by psychiatrist and started on Lexapro 5 mg which she was unable to get at the pharmacy so practically has not taken the medication.  Patient does not have any outpatient psychiatry follow-up.  I would recommend both psychiatry and therapy referrals for her.    Depression  Patient reports sadness since after she lost her  a few years ago.  Reports social isolation causing depression.  Patient did not take Lexapro since she was discharged from the hospital.  Reports difficulty falling asleep, started on a trial of Seroquel to see if that helps with anxiety and insomnia.  Patient will follow-up after 1 week for review of symptoms.    Suicide attempt (HCC)  With intentional overdose of multiple medications.  Remorseful of her actions.  Was treated with Lexapro but has not taken it since she was unable to get it at the pharmacy.  Started on Seroquel since patient is also reporting insomnia hopefully that should help stabilize her mood.  Referral to complex care management team placed to see if there are other resources available to the patient.  Will follow-up after 1 week.    - Counseling Documentation: patient was counseled regarding: diagnostic results, instructions for  management, risk factor reductions, prognosis, patient and family education, impressions, risks and benefits of treatment options, and importance of compliance with treatment    Transitional Care Management Review     Rosalind Wise is a 83 y.o. female here for TCM follow-up    During the TCM phone call patient stated:    TCM Call       Date and time call was made  3/25/2021  4:23 PM    Hospital care reviewed  Records reviewed    Patient was hospitialized at  [facility]    Date of Admission  03/24/21    Date of discharge  03/25/21    Diagnosis  AMS (altered mental status)    Disposition  Home    Were the patients medications reviewed and updated  Yes    Current Symptoms  None          TCM Call       Post hospital issues  None    Should patient be enrolled in anticoag monitoring?  No    Scheduled for follow up?  Yes    Patient refusal reason  pt not returning call    Patients specialists  Nephrologist; Neurologist; Cardiologist    Cardiologist name  KANDY CHAMBERS    Nephrologist name  ALIA ESPITIA     Neurologist name  NEUROSURGERY      Other specialists names  PT    Did you obtain your prescribed medications  Yes    Do you need help managing your prescriptions or medications  No    Is transportation to your appointment needed  No    I have advised the patient to call PCP with any new or worsening symptoms  WAN AGUILAR MA    Living Arrangements  Family members    Support System  Family    Do you have social support  Yes, as much as I need    Are you recieving any outpatient services  No    What type of services  PT AND OT    Are you recieving home care services  No    Are you using any community resources  No    Current waiver services  No    Have you fallen in the last 12 months  No    Interperter language line needed  No            History of Present Illness     HPI    Patient is following up from recent hospital discharge.  Admitted for intentional overdose with suicide ideation.  Patient  apparently ingested an unknown amount of Zofran, Atarax, Plavix, Benadryl, Tylenol tablets.  Patient had signs of anticholinergic effect, Tylenol level was 26 in the ER.  Toxicology was consulted and patient received supportive care and NAC therapy.  Patient was evaluated by psychiatrist who recommended to 201 which patient agreed to.  Patient states that she has been feeling sad and depressed since her   a few years ago.  She moved in with her son's family, but states that she is experiencing social isolation.  She is here with her daughter-in-law today, who has been diligently bringing her for all her medical appointments.  Patient is tearful and regretful of her action and repeats multiple times that she is never going to do it again however also states that her social situation will never change.  Patient received Lexapro during hospitalization but discontinued after discharge since she was unable to get the medication at the pharmacy.  States that she is awake at night and that stresses her out.  According to daughter-in-law she has other family members and friends that she could talk to if she wants.  Patient has her own cell phone, and a television in her room.      The following portions of the patient's history were reviewed and updated as appropriate: allergies, current medications, past family history, past medical history, past social history, past surgical history and problem list.    Review of Systems       Review of Systems   Constitutional: Negative.    Respiratory: Negative.     Cardiovascular: Negative.    Psychiatric/Behavioral:  Positive for sleep disturbance.        Active Problem List     Patient Active Problem List   Diagnosis    Benign essential hypertension    Chronic diastolic (congestive) heart failure (HCC)    Hypothyroidism    Acute on chronic diastolic congestive heart failure (HCC)    Arthropathy of knee    Hallux abductovalgus with bunions, unspecified laterality    CKD  (chronic kidney disease), stage III (HCC)    Diverticulitis of colon    Chronic gout without tophus    Hiatal hernia    Hyperlipemia    Hyperuricemia    Nephrolithiasis    WENDI (obstructive sleep apnea)    Pseudogout of left wrist    Gastroesophageal reflux disease without esophagitis    Medical clearance for psychiatric admission    Prediabetes    Status post right frontotemporal replacement cranioplasty    Sciatica of left side    Spinal stenosis of lumbar region with neurogenic claudication    Cervical radiculopathy    Paroxysmal atrial fibrillation (HCC)    Intentional overdose (HCC)    Atrial fibrillation with RVR (HCC)    Hypomagnesemia    Depression    Other insomnia       Objective     /78   Pulse 87   Ht 5' (1.524 m)   Wt 69.9 kg (154 lb)   SpO2 98%   BMI 30.08 kg/m²     Physical Exam  Constitutional:       Appearance: Normal appearance.   Cardiovascular:      Heart sounds: Normal heart sounds.   Pulmonary:      Breath sounds: Normal breath sounds.   Neurological:      Mental Status: She is oriented to person, place, and time.   Psychiatric:      Comments: Sad, remorseful of her action. Tearful. Able to provide history.          Laboratory Results: I have personally reviewed the pertinent laboratory results/reports     Radiology/Other Diagnostic Testing Results: I have personally reviewed pertinent reports.      No results found.     Current Medications     Current Outpatient Medications:     apixaban (Eliquis) 2.5 mg, Take 1 tablet (2.5 mg total) by mouth 2 (two) times a day, Disp: 180 tablet, Rfl: 3    atorvastatin (LIPITOR) 40 mg tablet, Take 1 tablet (40 mg total) by mouth every evening, Disp: 90 tablet, Rfl: 1    bumetanide (BUMEX) 1 mg tablet, Take 1 tablet (1 mg total) by mouth daily, Disp: 90 tablet, Rfl: 3    Diclofenac Sodium (VOLTAREN) 1 %, Apply 2 g topically in the morning APPLY NO MORE THAN 3 DAYS IN A ROW THEN TAKE A BREAK FOR ONE WEEK FROM USE., Disp: 100 g, Rfl: 1    escitalopram  (LEXAPRO) 5 mg tablet, Take 1 tablet (5 mg total) by mouth daily, Disp: 30 tablet, Rfl: 0    levothyroxine 88 mcg tablet, TAKE 1 TABLET EVERY DAY, Disp: 90 tablet, Rfl: 1    losartan (COZAAR) 25 mg tablet, TAKE 2 TABLETS IN THE MORNING AND TAKE 1 TABLET IN THE EVENING, Disp: 270 tablet, Rfl: 10    metoprolol tartrate (LOPRESSOR) 25 mg tablet, Take 0.5 tablets (12.5 mg total) by mouth every 12 (twelve) hours, Disp: 45 tablet, Rfl: 1    pantoprazole (PROTONIX) 20 mg tablet, TAKE 1 TABLET EVERY DAY, Disp: 90 tablet, Rfl: 1    QUEtiapine (SEROquel) 25 mg tablet, Take 1 tablet (25 mg total) by mouth daily at bedtime, Disp: 30 tablet, Rfl: 0    senna-docusate sodium (SENOKOT S) 8.6-50 mg per tablet, Take 1 tablet by mouth daily, Disp: 20 tablet, Rfl: 1    spironolactone (ALDACTONE) 25 mg tablet, Take 0.5 tablets (12.5 mg total) by mouth daily, Disp: 45 tablet, Rfl: 3    Suzanna Lopez MD  [facility]

## 2024-04-04 NOTE — PROGRESS NOTES
Subjective:      Patient ID: Rosalind Wise is a 83 y.o. female.    HPI    Past Medical History:   Diagnosis Date    Anxiety     Arthritis     joints    Bunion     Cataract     Disease of thyroid gland     Eczema     GERD (gastroesophageal reflux disease)     Gout     Heart failure (HCC)     Hepatitis     Hiatal hernia     Hypertension     Onychomycosis     last assessed: 16    Orthopnea     resolved: 16    Pseudogout of left wrist     Sleep apnea     wears CPAP    Stroke (HCC)        Family History   Problem Relation Age of Onset    Diabetes Mother     Coronary artery disease Mother     Arthritis Mother     Hypertension Mother     Hypertension Father     Diabetes Brother     Diabetes Son     Arthritis Family        Past Surgical History:   Procedure Laterality Date    ABDOMINAL SURGERY          BRAIN HEMATOMA EVACUATION Right 2020    Procedure: Right decompressive craniectomy and evacuation of intraparenchymal hematoma;  Surgeon: Apolinar Herrera MD;  Location: [facility];  Service: Neurosurgery    BREAST SURGERY Right     cyst removal    CARPAL TUNNEL RELEASE Left     CARPAL TUNNEL RELEASE Right     CATARACT EXTRACTION       SECTION  1960    x1    COLONOSCOPY N/A 2018    Procedure: COLONOSCOPY;  Surgeon: Alejandro Carlson MD;  Location: [facility] GI [facility];  Service: Gastroenterology    DILATION AND CURETTAGE OF UTERUS  1967    EYE SURGERY Left 2006    cataracts    HERNIA REPAIR      umbilical hernia    INCISIONAL HERNIA REPAIR      incarcerated    KNEE ARTHROSCOPY Right     CO RPLCMT BONE FLAP/PROSTHETIC PLATE SKULL Right 2020    Procedure: right frontotemporal replacement cranioplasty;  Surgeon: Apolinar Herrera MD;  Location:  MAIN OR;  Service: Neurosurgery    CO XCAPSL CTRC RMVL INSJ IO LENS PROSTH W/O ECP Right 3/27/2017    Procedure: EXTRACTION EXTRACAPSULAR CATARACT PHACO INTRAOCULAR LENS (IOL);  Surgeon: Trip Gilbert MD;  Location: [facility] MAIN OR;  Service:  Ophthalmology        reports that she has never smoked. She has never used smokeless tobacco. She reports current alcohol use. She reports that she does not use drugs.      Current Outpatient Medications:     apixaban (Eliquis) 2.5 mg, Take 1 tablet (2.5 mg total) by mouth 2 (two) times a day, Disp: 180 tablet, Rfl: 3    atorvastatin (LIPITOR) 40 mg tablet, Take 1 tablet (40 mg total) by mouth every evening, Disp: 90 tablet, Rfl: 1    bumetanide (BUMEX) 1 mg tablet, Take 1 tablet (1 mg total) by mouth daily, Disp: 90 tablet, Rfl: 3    Diclofenac Sodium (VOLTAREN) 1 %, Apply 2 g topically in the morning APPLY NO MORE THAN 3 DAYS IN A ROW THEN TAKE A BREAK FOR ONE WEEK FROM USE., Disp: 100 g, Rfl: 1    escitalopram (LEXAPRO) 5 mg tablet, Take 1 tablet (5 mg total) by mouth daily, Disp: 30 tablet, Rfl: 0    levothyroxine 88 mcg tablet, TAKE 1 TABLET EVERY DAY, Disp: 90 tablet, Rfl: 1    losartan (COZAAR) 25 mg tablet, TAKE 2 TABLETS IN THE MORNING AND TAKE 1 TABLET IN THE EVENING, Disp: 270 tablet, Rfl: 10    metoprolol tartrate (LOPRESSOR) 25 mg tablet, Take 0.5 tablets (12.5 mg total) by mouth every 12 (twelve) hours, Disp: 45 tablet, Rfl: 1    pantoprazole (PROTONIX) 20 mg tablet, TAKE 1 TABLET EVERY DAY, Disp: 90 tablet, Rfl: 1    senna-docusate sodium (SENOKOT S) 8.6-50 mg per tablet, Take 1 tablet by mouth daily, Disp: 20 tablet, Rfl: 1    spironolactone (ALDACTONE) 25 mg tablet, Take 0.5 tablets (12.5 mg total) by mouth daily, Disp: 45 tablet, Rfl: 3    The following portions of the patient's history were reviewed and updated as appropriate: allergies, current medications, past family history, past medical history, past social history, past surgical history and problem list.    Review of Systems        Objective:    /78   Pulse 87   Ht 5' (1.524 m)   Wt 69.9 kg (154 lb)   SpO2 98%   BMI 30.08 kg/m²      Physical Exam      Recent Results (from the past 1008 hour(s))   ECG 12 lead    Collection Time:  "03/22/24  6:07 PM   Result Value Ref Range    Ventricular Rate 133 BPM    Atrial Rate 131 BPM    NE Interval  ms    QRSD Interval 116 ms    QT Interval 348 ms    QTC Interval 517 ms    P Axis  degrees    QRS Axis 109 degrees    T Wave Axis -35 degrees   Fingerstick Glucose (POCT)    Collection Time: 03/22/24  6:08 PM   Result Value Ref Range    POC Glucose 130 65 - 140 mg/dl   CBC and differential    Collection Time: 03/22/24  6:26 PM   Result Value Ref Range    WBC 9.75 4.31 - 10.16 Thousand/uL    RBC 4.36 3.81 - 5.12 Million/uL    Hemoglobin 12.3 11.5 - 15.4 g/dL    Hematocrit 39.4 34.8 - 46.1 %    MCV 90 82 - 98 fL    MCH 28.2 26.8 - 34.3 pg    MCHC 31.2 (L) 31.4 - 37.4 g/dL    RDW 14.4 11.6 - 15.1 %    MPV 11.1 8.9 - 12.7 fL    Platelets 173 149 - 390 Thousands/uL   Comprehensive metabolic panel    Collection Time: 03/22/24  6:26 PM   Result Value Ref Range    Sodium 137 135 - 147 mmol/L    Potassium 4.1 3.5 - 5.3 mmol/L    Chloride 100 96 - 108 mmol/L    CO2 27 21 - 32 mmol/L    ANION GAP 10 4 - 13 mmol/L    BUN 33 (H) 5 - 25 mg/dL    Creatinine 1.24 0.60 - 1.30 mg/dL    Glucose 135 65 - 140 mg/dL    Calcium 9.6 8.4 - 10.2 mg/dL    AST 18 13 - 39 U/L    ALT 12 7 - 52 U/L    Alkaline Phosphatase 102 34 - 104 U/L    Total Protein 6.9 6.4 - 8.4 g/dL    Albumin 4.4 3.5 - 5.0 g/dL    Total Bilirubin 0.79 0.20 - 1.00 mg/dL    eGFR 39 ml/min/1.73sq m   TSH, 3rd generation with Free T4 reflex    Collection Time: 03/22/24  6:26 PM   Result Value Ref Range    TSH 3RD GENERATON 4.099 0.450 - 4.500 uIU/mL   HS Troponin 0hr (reflex protocol)    Collection Time: 03/22/24  6:26 PM   Result Value Ref Range    hs TnI 0hr 25 \"Refer to ACS Flowchart\"- see link ng/L   Ammonia    Collection Time: 03/22/24  6:26 PM   Result Value Ref Range    Ammonia 15 (L) 18 - 72 umol/L   Lactic acid, plasma (w/reflex if result > 2.0)    Collection Time: 03/22/24  6:26 PM   Result Value Ref Range    LACTIC ACID 1.5 0.5 - 2.0 mmol/L   Ethanol    " "Collection Time: 03/22/24  6:26 PM   Result Value Ref Range    Ethanol Lvl <10 <10 mg/dL   Salicylate level    Collection Time: 03/22/24  6:26 PM   Result Value Ref Range    Salicylate Lvl <5 3 - 20 mg/dL   Acetaminophen level-If concentration is detectable, please discuss with medical  on call.    Collection Time: 03/22/24  6:26 PM   Result Value Ref Range    Acetaminophen Level 26 (HH) 10 - 20 ug/mL   Manual Differential(PHLEBS Do Not Order)    Collection Time: 03/22/24  6:26 PM   Result Value Ref Range    Segmented % 86 (H) 43 - 75 %    Bands % 2 0 - 8 %    Lymphocytes % 8 (L) 14 - 44 %    Monocytes % 4 4 - 12 %    Eosinophils % 0 0 - 6 %    Basophils % 0 0 - 1 %    Absolute Neutrophils 8.58 (H) 1.85 - 7.62 Thousand/uL    Absolute Lymphocytes 0.78 0.60 - 4.47 Thousand/uL    Absolute Monocytes 0.39 0.00 - 1.22 Thousand/uL    Absolute Eosinophils 0.00 0.00 - 0.40 Thousand/uL    Absolute Basophils 0.00 0.00 - 0.10 Thousand/uL    Total Counted      RBC Morphology Present     Platelet Estimate Adequate Adequate    Acanthocytes Present     Mirza Cells Present     Ovalocytes Present     Poikilocytes Present    ECG 12 lead    Collection Time: 03/22/24  7:01 PM   Result Value Ref Range    Ventricular Rate 130 BPM    Atrial Rate 159 BPM    NH Interval  ms    QRSD Interval 126 ms    QT Interval 360 ms    QTC Interval 529 ms    P Axis  degrees    QRS Axis 120 degrees    T Wave Axis -39 degrees   Blood gas, venous    Collection Time: 03/22/24  7:55 PM   Result Value Ref Range    pH, Jack 7.322 7.300 - 7.400    pCO2, Jack 52.1 (H) 42.0 - 50.0 mm Hg    pO2, Jack 36.1 35.0 - 45.0 mm Hg    HCO3, Jack 26.4 24 - 30 mmol/L    Base Excess, Jack -0.4 mmol/L    O2 Content, Jack 12.5 ml/dL    O2 HGB, VENOUS 66.5 60.0 - 80.0 %   HS Troponin I 2hr    Collection Time: 03/22/24  7:55 PM   Result Value Ref Range    hs TnI 2hr 26 \"Refer to ACS Flowchart\"- see link ng/L    Delta 2hr hsTnI 1 <20 ng/L   HS Troponin I 4hr    Collection Time: " "03/22/24 10:13 PM   Result Value Ref Range    hs TnI 4hr 26 \"Refer to ACS Flowchart\"- see link ng/L    Delta 4hr hsTnI 1 <20 ng/L   CBC (With Platelets)    Collection Time: 03/23/24  5:01 AM   Result Value Ref Range    WBC 10.41 (H) 4.31 - 10.16 Thousand/uL    RBC 4.08 3.81 - 5.12 Million/uL    Hemoglobin 11.6 11.5 - 15.4 g/dL    Hematocrit 36.7 34.8 - 46.1 %    MCV 90 82 - 98 fL    MCH 28.4 26.8 - 34.3 pg    MCHC 31.6 31.4 - 37.4 g/dL    RDW 14.4 11.6 - 15.1 %    Platelets 169 149 - 390 Thousands/uL    MPV 11.2 8.9 - 12.7 fL   Comprehensive metabolic panel    Collection Time: 03/23/24  5:01 AM   Result Value Ref Range    Sodium 136 135 - 147 mmol/L    Potassium 3.3 (L) 3.5 - 5.3 mmol/L    Chloride 101 96 - 108 mmol/L    CO2 25 21 - 32 mmol/L    ANION GAP 10 4 - 13 mmol/L    BUN 27 (H) 5 - 25 mg/dL    Creatinine 1.05 0.60 - 1.30 mg/dL    Glucose 143 (H) 65 - 140 mg/dL    Calcium 8.7 8.4 - 10.2 mg/dL    AST 13 13 - 39 U/L    ALT 11 7 - 52 U/L    Alkaline Phosphatase 81 34 - 104 U/L    Total Protein 6.3 (L) 6.4 - 8.4 g/dL    Albumin 3.8 3.5 - 5.0 g/dL    Total Bilirubin 0.57 0.20 - 1.00 mg/dL    eGFR 48 ml/min/1.73sq m   CBC    Collection Time: 03/24/24  2:40 AM   Result Value Ref Range    WBC 10.62 (H) 4.31 - 10.16 Thousand/uL    RBC 4.04 3.81 - 5.12 Million/uL    Hemoglobin 11.6 11.5 - 15.4 g/dL    Hematocrit 35.9 34.8 - 46.1 %    MCV 89 82 - 98 fL    MCH 28.7 26.8 - 34.3 pg    MCHC 32.3 31.4 - 37.4 g/dL    RDW 14.6 11.6 - 15.1 %    Platelets 157 149 - 390 Thousands/uL    MPV 10.9 8.9 - 12.7 fL   Comprehensive metabolic panel    Collection Time: 03/24/24  2:40 AM   Result Value Ref Range    Sodium 137 135 - 147 mmol/L    Potassium 3.4 (L) 3.5 - 5.3 mmol/L    Chloride 105 96 - 108 mmol/L    CO2 23 21 - 32 mmol/L    ANION GAP 9 4 - 13 mmol/L    BUN 25 5 - 25 mg/dL    Creatinine 1.11 0.60 - 1.30 mg/dL    Glucose 93 65 - 140 mg/dL    Calcium 8.9 8.4 - 10.2 mg/dL    AST 12 (L) 13 - 39 U/L    ALT 10 7 - 52 U/L    Alkaline " Phosphatase 78 34 - 104 U/L    Total Protein 6.1 (L) 6.4 - 8.4 g/dL    Albumin 3.7 3.5 - 5.0 g/dL    Total Bilirubin 0.51 0.20 - 1.00 mg/dL    eGFR 45 ml/min/1.73sq m   Basic metabolic panel    Collection Time: 03/25/24  6:56 AM   Result Value Ref Range    Sodium 140 135 - 147 mmol/L    Potassium 3.1 (L) 3.5 - 5.3 mmol/L    Chloride 104 96 - 108 mmol/L    CO2 28 21 - 32 mmol/L    ANION GAP 8 4 - 13 mmol/L    BUN 23 5 - 25 mg/dL    Creatinine 0.96 0.60 - 1.30 mg/dL    Glucose 104 65 - 140 mg/dL    Calcium 8.9 8.4 - 10.2 mg/dL    eGFR 54 ml/min/1.73sq m   CBC    Collection Time: 03/25/24  6:56 AM   Result Value Ref Range    WBC 10.68 (H) 4.31 - 10.16 Thousand/uL    RBC 4.02 3.81 - 5.12 Million/uL    Hemoglobin 11.5 11.5 - 15.4 g/dL    Hematocrit 35.5 34.8 - 46.1 %    MCV 88 82 - 98 fL    MCH 28.6 26.8 - 34.3 pg    MCHC 32.4 31.4 - 37.4 g/dL    RDW 14.5 11.6 - 15.1 %    Platelets 164 149 - 390 Thousands/uL    MPV 11.1 8.9 - 12.7 fL   Basic metabolic panel    Collection Time: 03/26/24  7:47 AM   Result Value Ref Range    Sodium 140 135 - 147 mmol/L    Potassium 3.6 3.5 - 5.3 mmol/L    Chloride 103 96 - 108 mmol/L    CO2 28 21 - 32 mmol/L    ANION GAP 9 4 - 13 mmol/L    BUN 24 5 - 25 mg/dL    Creatinine 0.88 0.60 - 1.30 mg/dL    Glucose 99 65 - 140 mg/dL    Calcium 9.0 8.4 - 10.2 mg/dL    eGFR 60 ml/min/1.73sq m   Urinalysis with microscopic    Collection Time: 03/26/24 11:18 AM   Result Value Ref Range    Color, UA Light Yellow     Clarity, UA Clear     Specific Gravity, UA 1.009 1.003 - 1.030    pH, UA 5.0 4.5, 5.0, 5.5, 6.0, 6.5, 7.0, 7.5, 8.0    Leukocytes, UA Trace (A) Negative    Nitrite, UA Negative Negative    Protein, UA Negative Negative mg/dl    Glucose, UA Negative Negative mg/dl    Ketones, UA Negative Negative mg/dl    Urobilinogen, UA <2.0 <2.0 mg/dl mg/dl    Bilirubin, UA Negative Negative    Occult Blood, UA Negative Negative    RBC, UA 1-2 None Seen, 1-2 /hpf    WBC, UA 1-2 None Seen, 1-2 /hpf     Epithelial Cells None Seen None Seen, Occasional /hpf    Bacteria, UA Occasional None Seen, Occasional /hpf    Hyaline Casts, UA 0-3 (A) None Seen /lpf   Comprehensive metabolic panel    Collection Time: 03/27/24  5:51 AM   Result Value Ref Range    Sodium 141 135 - 147 mmol/L    Potassium 4.2 3.5 - 5.3 mmol/L    Chloride 100 96 - 108 mmol/L    CO2 32 21 - 32 mmol/L    ANION GAP 9 4 - 13 mmol/L    BUN 31 (H) 5 - 25 mg/dL    Creatinine 1.12 0.60 - 1.30 mg/dL    Glucose 115 65 - 140 mg/dL    Glucose, Fasting 115 (H) 65 - 99 mg/dL    Calcium 9.4 8.4 - 10.2 mg/dL    AST 10 (L) 13 - 39 U/L    ALT 9 7 - 52 U/L    Alkaline Phosphatase 72 34 - 104 U/L    Total Protein 6.6 6.4 - 8.4 g/dL    Albumin 3.8 3.5 - 5.0 g/dL    Total Bilirubin 0.77 0.20 - 1.00 mg/dL    eGFR 45 ml/min/1.73sq m   Magnesium    Collection Time: 03/27/24  5:51 AM   Result Value Ref Range    Magnesium 1.7 (L) 1.9 - 2.7 mg/dL   Phosphorus    Collection Time: 03/27/24  5:51 AM   Result Value Ref Range    Phosphorus 2.6 2.3 - 4.1 mg/dL   CBC and differential    Collection Time: 03/27/24  5:51 AM   Result Value Ref Range    WBC 9.30 4.31 - 10.16 Thousand/uL    RBC 4.38 3.81 - 5.12 Million/uL    Hemoglobin 13.1 11.5 - 15.4 g/dL    Hematocrit 38.8 34.8 - 46.1 %    MCV 89 82 - 98 fL    MCH 29.9 26.8 - 34.3 pg    MCHC 33.8 31.4 - 37.4 g/dL    RDW 14.4 11.6 - 15.1 %    MPV 11.1 8.9 - 12.7 fL    Platelets 214 149 - 390 Thousands/uL    nRBC 0 /100 WBCs    Neutrophils Relative 81 (H) 43 - 75 %    Immature Grans % 0 0 - 2 %    Lymphocytes Relative 9 (L) 14 - 44 %    Monocytes Relative 9 4 - 12 %    Eosinophils Relative 1 0 - 6 %    Basophils Relative 0 0 - 1 %    Neutrophils Absolute 7.44 1.85 - 7.62 Thousands/µL    Absolute Immature Grans 0.04 0.00 - 0.20 Thousand/uL    Absolute Lymphocytes 0.86 0.60 - 4.47 Thousands/µL    Absolute Monocytes 0.84 0.17 - 1.22 Thousand/µL    Eosinophils Absolute 0.09 0.00 - 0.61 Thousand/µL    Basophils Absolute 0.03 0.00 - 0.10  Thousands/µL   TSH, 3rd generation with Free T4 reflex    Collection Time: 03/27/24  5:51 AM   Result Value Ref Range    TSH 3RD GENERATON 2.103 0.450 - 4.500 uIU/mL   Vitamin D 25 hydroxy    Collection Time: 03/27/24  5:51 AM   Result Value Ref Range    Vit D, 25-Hydroxy 31.7 30.0 - 100.0 ng/mL   Lipid panel    Collection Time: 03/27/24  5:51 AM   Result Value Ref Range    Cholesterol 105 See Comment mg/dL    Triglycerides 78 See Comment mg/dL    HDL, Direct 54 >=50 mg/dL    LDL Calculated 35 0 - 100 mg/dL    Non-HDL-Chol (CHOL-HDL) 51 mg/dl   Hemoglobin A1C    Collection Time: 03/27/24  5:51 AM   Result Value Ref Range    Hemoglobin A1C 6.0 (H) Normal 4.0-5.6%; PreDiabetic 5.7-6.4%; Diabetic >=6.5%; Glycemic control for adults with diabetes <7.0% %     mg/dl   ECG 12 lead    Collection Time: 03/27/24  8:53 AM   Result Value Ref Range    Ventricular Rate 0 BPM    Atrial Rate 0 BPM    MD Interval  ms    QRSD Interval 0 ms    QT Interval 0 ms    QTC Interval 0 ms    P Axis  degrees    QRS Axis 0 degrees    T Wave Axis 0 degrees   ECG 12 lead    Collection Time: 03/27/24  9:04 AM   Result Value Ref Range    Ventricular Rate 86 BPM    Atrial Rate 101 BPM    MD Interval  ms    QRSD Interval 122 ms    QT Interval 410 ms    QTC Interval 490 ms    P Axis  degrees    QRS Axis 90 degrees    T Wave Axis -21 degrees       Assessment/Plan:    No problem-specific Assessment & Plan notes found for this encounter.         {[email or web address]}

## 2024-04-04 NOTE — ASSESSMENT & PLAN NOTE
Wt Readings from Last 3 Encounters:   04/04/24 69.9 kg (154 lb)   03/28/24 68.5 kg (151 lb 0.2 oz)   03/26/24 71 kg (156 lb 8.4 oz)     Stable.  Continue spironolactone, losartan, bumetanide per cardiology.

## 2024-04-04 NOTE — ASSESSMENT & PLAN NOTE
Patient reports sadness since after she lost her  a few years ago.  Reports social isolation causing depression.  Patient did not take Lexapro since she was discharged from the hospital.  Reports difficulty falling asleep, started on a trial of Seroquel to see if that helps with anxiety and insomnia.  Patient will follow-up after 1 week for review of symptoms.

## 2024-04-04 NOTE — ASSESSMENT & PLAN NOTE
With intentional overdose of multiple medications.  Remorseful of her actions.  Was treated with Lexapro but has not taken it since she was unable to get it at the pharmacy.  Started on Seroquel since patient is also reporting insomnia hopefully that should help stabilize her mood.  Referral to complex care management team placed to see if there are other resources available to the patient.  Will follow-up after 1 week.

## 2024-04-04 NOTE — ASSESSMENT & PLAN NOTE
Patient following up from recent hospital discharge after intentional overdose on her prescribed medications.  Patient evaluated by psychiatrist and started on Lexapro 5 mg which she was unable to get at the pharmacy so practically has not taken the medication.  Patient does not have any outpatient psychiatry follow-up.  I would recommend both psychiatry and therapy referrals for her.

## 2024-04-05 ENCOUNTER — PATIENT OUTREACH (OUTPATIENT)
Dept: FAMILY MEDICINE CLINIC | Facility: CLINIC | Age: 84
End: 2024-04-05

## 2024-04-05 NOTE — PROGRESS NOTES
Received referral from PCP for complex care management.   Met with pt at PCP office. Provided my business card and contact information.  Supportive listening offered.  Lives with her son, daughter in law and grandson.  Pt has sufficient shelter, access to food and transportation.  She is independent with her personal care, ambulates with rollator.  Pt wishes to remain independent. We discussed options of the local [facility]. Also discussed [employer] if in the future she would want to explore alternative living arrangements.  Appreciative for information.  Pt will call RN ANDREA if future assistance is needed.

## 2024-04-09 ENCOUNTER — OFFICE VISIT (OUTPATIENT)
Dept: NEPHROLOGY | Facility: CLINIC | Age: 84
End: 2024-04-09

## 2024-04-09 ENCOUNTER — TELEPHONE (OUTPATIENT)
Dept: BEHAVIORAL/MENTAL HEALTH CLINIC | Facility: CLINIC | Age: 84
End: 2024-04-09

## 2024-04-09 VITALS
BODY MASS INDEX: 29.25 KG/M2 | WEIGHT: 149 LBS | HEIGHT: 60 IN | SYSTOLIC BLOOD PRESSURE: 130 MMHG | HEART RATE: 82 BPM | DIASTOLIC BLOOD PRESSURE: 60 MMHG

## 2024-04-09 DIAGNOSIS — N18.30 STAGE 3 CHRONIC KIDNEY DISEASE, UNSPECIFIED WHETHER STAGE 3A OR 3B CKD (HCC): Primary | ICD-10-CM

## 2024-04-09 DIAGNOSIS — I10 BENIGN ESSENTIAL HTN: ICD-10-CM

## 2024-04-09 NOTE — PROGRESS NOTES
NEPHROLOGY OFFICE VISIT   Rosalind Wise 83 y.o. female MRN: [id number]  4/9/2024    Reason for Visit: CKD III    ASSESSMENT and PLAN:    I had the pleasure of seeing Ms Wise today in the renal clinic for the continued management of CKD III.     2/2020 - February with 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 on February 11th.  Completed course of Keppra for seizure prophylaxis.     3/30/2020 - losartan was increased to 50 mg twice a day from 50 in the morning and 25 in the evening due to high blood pressures     4/3/2020 - spironolactone 12.5 mg daily was started due to continued high blood pressure     4/2020 - patient had presented for replacement current of cranioplasty on April 6.  And subsequently was admitted to the rehab center postoperatively      March 2021- patient was admitted to hospital with not feeling well and   Headache.  CT scan of the head with no acute intracranial pathology. The other symptoms of memory loss were also present.  Patient was referred to geriatrician.     April 2021 - sciatic pain. Received brief steroid course     5/2021 - back pain. Went to ER. Given pred taper     5/2023 - neck pain.CT of spine -  no cervical spine fracture or traumatic malalignmen      October 2023-patient was in the ER for dizziness and headache.  Patient was given normal saline.  Had a CAT scan completed.  CT of the head no acute intracranial abnormalities with unchanged large chronic encephalomalacia and gliosis in the right temporal lobe in the region of remote hemorrhage.  Chest x-ray was unrevealing.  Patient was discharged after volume expansion and feeling improved.     October 27, 2023-patient to follow-up with cardiology team.  For now mitral valve is being monitored. CT abdpel - no acute findings. Potential gastritis    March 26, 2024-patient had intentional overdose of prescribed medications and was admitted to the hospital.  Had ingested Zofran, Atarax,  Plavix, Benadryl, Tylenol cold and flu tablets.  Received N-acetylcysteine.  Had signs of anticholinergic effects.  With regards to patient's hypertension, metoprolol was increased to 25 mg twice daily especially given that the patient was in A-fib with RVR requiring Cardizem drip.  Patient was admitted after discharge from medicine team to the psychiatric team for further mental health care.  Patient was started on Lexapro.  Was discharged in stable and improving condition..     83-year-old female with a past medical history of chronic kidney disease, venous stasis, HTN, hypothyroidism, lumbar canal stenosis, Anxiety, GERD, WENDI, neuropathy, Gout, EF 50-55%, Grade 2 diastolic dysfunction who presents for follow up for CKD. To note, patient's  has now passed away in August.     1) CKD III - patient also has a long standing history of HTN. Creatinine in 2013, 0.9 mg/dL. Cr early 2016 was 0.80-0.9 mg/dL; then increased starting in august 2016 to 2.3 mg/dL and has fluctuated since. CT scan in 2016, kidneys appearing normal at that time.      Etiology of CKD may be long standing HTN and ATN. Baseline Cr ~ 1.1 -1.3 mg/dL     - Urine eos 0%;   - A1c controlled prior  at 5.6% 12/2022  - UPCR too low to quant (6/2023)  - UA bland 6/2023  -SPEP unrevealing  -UPEP unrevealing  - C3, C4 unrevealing  - Renal u/s with R 10.3 cm, L 10.4 cm, renal cortex 1 cm b/l; both kidneys slightly reduced in size; lower pole of R kidney is non obstructing calculus  - 10/2023 - CT without hydro on kidney  - Pt follows with Urology team for nephrolithiasis.   - No NSAIDs, the patient is not currently taking NSAID  -nuc stress test per Cardiology in Jan 2019 unrevealing  - repeat renal u/s 1/2019 - unrevealing with exception of renal cortical atrophy; but also 6 mm non shadowing calculus.     Overall, patient is seen for appointment for hospital follow-up.  Patient was admitted for suicidal ideation.  During hospitalization, metoprolol was  increased to 25 mg twice daily for A-fib with RVR.  It appears amlodipine was held on discharge.  Metoprolol was reduced back to 12 and half milligrams twice daily.  I am not sure why amlodipine was held but the blood pressures are stable today and appropriate 130 systolic.  Her weight has been decreasing and her blood pressures may be better controlled with decrease in weight.  She states that she is now eating better again.  Her volume state is euvolemic on 1 mg of Bumex a day therefore I did not make adjustment today.     Plan:    - pt states she is taking metoprolol 12.5 mg BID (was taking 25 mg BID in hosp due to a fib with RVR) for now I will not changes  - Continue holding amlodipine for now  - Continue spironolactone 12-1/2 mg daily  - Continue losartan 50 mg in the morning and 25 mg in the evening  - Continue Bumex 1 mg once a day  - Lab work in 2 months  - Appointment in 2 months  - Overall, patient expressed significant concerns regarding her mental health that she is feeling sad.  She was tearful in the office.  She has no suicidal or homicidal ideations.  She has no thoughts also of hurting herself or plans.  She states that she is just feeling very sad.  She feels that her family is blaming her for what happened regarding the suicide attempt 1 month ago.  She is attempting to find things that bring her zelda such as meeting up with her friend today to help her make bread.  I reached out to her psychiatrist to help the patient's schedule follow-up appointment as the patient states that she does not use her MyChart and her family does on her behalf.  Therefore I have reached out to his psychiatrist who saw the patient in the hospital for follow-up appointment.  Patient knows to call right away if she develops any suicidal ideations or thoughts and to go right to the ER.     2) SOB - Volume - follows with Cardiology     - on Bumex.  Will lower slightly to 1 mg once a day on December 15, 2023 as the patient  may be becoming hypovolemic and patient is euvolemic.     3) HTN -      - cont losartan, bumex and amlodipine, spironolactone  -  due to bradycardia, beta-blocker was held prior but given new onset atrial fibrillation around May 2022, patient was restarted on metoprolol per Cardiology team  - December 2023-lower Bumex to 1 mg once daily due to concern for hypovolemia  - March 2024-it appears that the patient's amlodipine was held in the hospital.  Blood pressures are appropriate April 9, 2024 at appointment.     4) hypothyroid - as per Primary Care Physician     5) HPL - Primary Care following.      6) Electrolytes - stable     7) MBD -     - Vit D 50.7 in nov 2019 --> 40.8 March 2022  - PTH improved 89 in nov 2019 --> 78.6 in June 2020 --> 88 3/8/2022 --> 136 December 2022 -->109 December 2023.  Appropriate for now.     8) gout -      -monitor for flare     9) back pain - follows with Pain management     - pain sig improved with inj     10) Colonoscopy in feb with internal hemorrhoids and polyp removed.      11) alk phos elevation     -improved     12) Anemia - Hb stable     -hemoglobin stable and at goal     13) Mitral valve calcification     - moderate to severe mitral regurgitation  -eventually may require further evaluation.  Being monitored for now conservatively.  - per Cardiology team     14) elevated D-dimer prior-patient was given duplex of lower extremities which was unrevealing prior     15) knee pain after fall in august 2020     - saw Orthopedic team  - steroid injection was given     16) intracranial hemorrhage with mass effect requiring craniotomy on 2/11/2020     - now is status post replacement of cranioplasty in April 2020     17)  Anxiety- patient was started on Cymbalta. PCP is attempting off of gabapentin     - there were periods of not taking cymbalta     18)   Zoster infection in November 2020-treated with Valtrex per primary team     19) a fib - new onset in May 2022     -was advised to have  Holter monitor.  There is concern for tachy-hola syndrome initially and Holter monitor showed periods of atrial fibrillation with RVR and nonsustained ventricular tachycardia.  Was started on low-dose beta-blocker with metoprolol.  -in July metoprolol was titrated further to 25 mg twice a day  -was started on Eliquis 2.5 mg twice a day.  Lower dose was chosen due to high risk factors.  - Patient is currently on metoprolol 12 and half milligrams twice daily.  I did not make changes to this today.  I reached out to the cardiologist to discuss case.       It was a pleasure evaluating your patient in the office today. Thank you for allowing our team to participate in the care of Ms Rosalind Wise. Please do not hesitate to contact our team if further issues/questions shall arise in the interim.     No problem-specific Assessment & Plan notes found for this encounter.      I have spent a total time of 42 minutes on 04/09/24 in caring for this patient including     HPI:    Patient denies complaints with the exception of feeling very sad.  She is tearful.    PATIENT INSTRUCTIONS:    Patient Instructions   1) Avoid NSAIDS - (Example - motrin, advil, ibuprofen, aleve, exederin, etc)  2) Always follow a low salt diet  3) If you have any issues with trouble with nausea, vomiting, urinating, blood in the urine, food tasting like metal, confusion, weakness, fatigue that is worsening, or any other questions, please call right away.  4) Please continue to follow regularly with your family physician and any other specialist that you are advised to see and continue to follow their recommendations  5) please have labwork in 2 months before your appt  6) no changes to your medications   7) please call in two weeks with your blood pressure readings        OBJECTIVE:  Current Weight: Weight - Scale: 67.6 kg (149 lb)  Vitals:    04/09/24 1024 04/09/24 1109   BP:  130/60   Pulse:  82   Weight: 67.6 kg (149 lb)    Height: 5' (1.524 m)      Body mass index is 29.1 kg/m².      REVIEW OF SYSTEMS:    Review of Systems   Constitutional: Negative.  Negative for fatigue.   HENT: Negative.     Eyes: Negative.    Respiratory: Negative.  Negative for shortness of breath.    Cardiovascular: Negative.  Negative for leg swelling.   Gastrointestinal: Negative.    Endocrine: Negative.    Genitourinary: Negative.  Negative for difficulty urinating.   Musculoskeletal: Negative.    Skin: Negative.    Allergic/Immunologic: Negative.    Neurological: Negative.    Hematological: Negative.    Psychiatric/Behavioral:          Depressed mood   All other systems reviewed and are negative.      PHYSICAL EXAM:      Physical Exam  Vitals and nursing note reviewed.   Constitutional:       General: She is not in acute distress.     Appearance: She is well-developed. She is not diaphoretic.   HENT:      Head: Normocephalic and atraumatic.   Eyes:      General: No scleral icterus.        Right eye: No discharge.         Left eye: No discharge.      Conjunctiva/sclera: Conjunctivae normal.   Neck:      Vascular: No JVD.   Cardiovascular:      Rate and Rhythm: Normal rate and regular rhythm.      Heart sounds: No murmur heard.     No friction rub. No gallop.   Pulmonary:      Effort: Pulmonary effort is normal. No respiratory distress.      Breath sounds: Normal breath sounds. No wheezing or rales.   Abdominal:      General: Bowel sounds are normal. There is no distension.      Palpations: Abdomen is soft.      Tenderness: There is no abdominal tenderness. There is no rebound.   Musculoskeletal:         General: No tenderness or deformity. Normal range of motion.      Cervical back: Normal range of motion and neck supple.   Skin:     General: Skin is warm and dry.      Coloration: Skin is not pale.      Findings: No erythema or rash.   Neurological:      Mental Status: She is alert and oriented to person, place, and time.      Coordination: Coordination normal.   Psychiatric:        "  Behavior: Behavior normal.         Thought Content: Thought content normal.         Judgment: Judgment normal.         Medications:    Current Outpatient Medications:     apixaban (Eliquis) 2.5 mg, Take 1 tablet (2.5 mg total) by mouth 2 (two) times a day, Disp: 180 tablet, Rfl: 3    atorvastatin (LIPITOR) 40 mg tablet, Take 1 tablet (40 mg total) by mouth every evening, Disp: 90 tablet, Rfl: 1    bumetanide (BUMEX) 1 mg tablet, Take 1 tablet (1 mg total) by mouth daily, Disp: 90 tablet, Rfl: 3    Diclofenac Sodium (VOLTAREN) 1 %, Apply 2 g topically in the morning APPLY NO MORE THAN 3 DAYS IN A ROW THEN TAKE A BREAK FOR ONE WEEK FROM USE., Disp: 100 g, Rfl: 1    escitalopram (LEXAPRO) 5 mg tablet, Take 1 tablet (5 mg total) by mouth daily, Disp: 30 tablet, Rfl: 0    levothyroxine 88 mcg tablet, TAKE 1 TABLET EVERY DAY, Disp: 90 tablet, Rfl: 1    losartan (COZAAR) 25 mg tablet, TAKE 2 TABLETS IN THE MORNING AND TAKE 1 TABLET IN THE EVENING, Disp: 270 tablet, Rfl: 10    metoprolol tartrate (LOPRESSOR) 25 mg tablet, Take 0.5 tablets (12.5 mg total) by mouth every 12 (twelve) hours, Disp: 45 tablet, Rfl: 1    pantoprazole (PROTONIX) 20 mg tablet, TAKE 1 TABLET EVERY DAY, Disp: 90 tablet, Rfl: 1    QUEtiapine (SEROquel) 25 mg tablet, Take 1 tablet (25 mg total) by mouth daily at bedtime, Disp: 30 tablet, Rfl: 0    senna-docusate sodium (SENOKOT S) 8.6-50 mg per tablet, Take 1 tablet by mouth daily, Disp: 20 tablet, Rfl: 1    spironolactone (ALDACTONE) 25 mg tablet, Take 0.5 tablets (12.5 mg total) by mouth daily, Disp: 45 tablet, Rfl: 3    Laboratory Results:        Invalid input(s): \"ALBUMIN\"    Results for orders placed or performed during the hospital encounter of 03/26/24   Comprehensive metabolic panel   Result Value Ref Range    Sodium 141 135 - 147 mmol/L    Potassium 4.2 3.5 - 5.3 mmol/L    Chloride 100 96 - 108 mmol/L    CO2 32 21 - 32 mmol/L    ANION GAP 9 4 - 13 mmol/L    BUN 31 (H) 5 - 25 mg/dL    " Creatinine 1.12 0.60 - 1.30 mg/dL    Glucose 115 65 - 140 mg/dL    Glucose, Fasting 115 (H) 65 - 99 mg/dL    Calcium 9.4 8.4 - 10.2 mg/dL    AST 10 (L) 13 - 39 U/L    ALT 9 7 - 52 U/L    Alkaline Phosphatase 72 34 - 104 U/L    Total Protein 6.6 6.4 - 8.4 g/dL    Albumin 3.8 3.5 - 5.0 g/dL    Total Bilirubin 0.77 0.20 - 1.00 mg/dL    eGFR 45 ml/min/1.73sq m   Magnesium   Result Value Ref Range    Magnesium 1.7 (L) 1.9 - 2.7 mg/dL   Phosphorus   Result Value Ref Range    Phosphorus 2.6 2.3 - 4.1 mg/dL   CBC and differential   Result Value Ref Range    WBC 9.30 4.31 - 10.16 Thousand/uL    RBC 4.38 3.81 - 5.12 Million/uL    Hemoglobin 13.1 11.5 - 15.4 g/dL    Hematocrit 38.8 34.8 - 46.1 %    MCV 89 82 - 98 fL    MCH 29.9 26.8 - 34.3 pg    MCHC 33.8 31.4 - 37.4 g/dL    RDW 14.4 11.6 - 15.1 %    MPV 11.1 8.9 - 12.7 fL    Platelets 214 149 - 390 Thousands/uL    nRBC 0 /100 WBCs    Segmented % 81 (H) 43 - 75 %    Immature Grans % 0 0 - 2 %    Lymphocytes % 9 (L) 14 - 44 %    Monocytes % 9 4 - 12 %    Eosinophils Relative 1 0 - 6 %    Basophils Relative 0 0 - 1 %    Absolute Neutrophils 7.44 1.85 - 7.62 Thousands/µL    Absolute Immature Grans 0.04 0.00 - 0.20 Thousand/uL    Absolute Lymphocytes 0.86 0.60 - 4.47 Thousands/µL    Absolute Monocytes 0.84 0.17 - 1.22 Thousand/µL    Eosinophils Absolute 0.09 0.00 - 0.61 Thousand/µL    Basophils Absolute 0.03 0.00 - 0.10 Thousands/µL   TSH, 3rd generation with Free T4 reflex   Result Value Ref Range    TSH 3RD GENERATON 2.103 0.450 - 4.500 uIU/mL   Vitamin D 25 hydroxy   Result Value Ref Range    Vit D, 25-Hydroxy 31.7 30.0 - 100.0 ng/mL   Lipid panel   Result Value Ref Range    Cholesterol 105 See Comment mg/dL    Triglycerides 78 See Comment mg/dL    HDL, Direct 54 >=50 mg/dL    LDL Calculated 35 0 - 100 mg/dL    Non-HDL-Chol (CHOL-HDL) 51 mg/dl   Hemoglobin A1C   Result Value Ref Range    Hemoglobin A1C 6.0 (H) Normal 4.0-5.6%; PreDiabetic 5.7-6.4%; Diabetic >=6.5%; Glycemic  control for adults with diabetes <7.0% %     mg/dl   ECG 12 lead   Result Value Ref Range    Ventricular Rate 0 BPM    Atrial Rate 0 BPM    KS Interval  ms    QRSD Interval 0 ms    QT Interval 0 ms    QTC Interval 0 ms    P Axis  degrees    QRS Axis 0 degrees    T Wave Axis 0 degrees   ECG 12 lead   Result Value Ref Range    Ventricular Rate 86 BPM    Atrial Rate 101 BPM    KS Interval  ms    QRSD Interval 122 ms    QT Interval 410 ms    QTC Interval 490 ms    P Axis  degrees    QRS Axis 90 degrees    T Wave Axis -21 degrees

## 2024-04-09 NOTE — PATIENT INSTRUCTIONS
1) Avoid NSAIDS - (Example - motrin, advil, ibuprofen, aleve, exederin, etc)  2) Always follow a low salt diet  3) If you have any issues with trouble with nausea, vomiting, urinating, blood in the urine, food tasting like metal, confusion, weakness, fatigue that is worsening, or any other questions, please call right away.  4) Please continue to follow regularly with your family physician and any other specialist that you are advised to see and continue to follow their recommendations  5) please have labwork in 2 months before your appt  6) no changes to your medications   7) please call in two weeks with your blood pressure readings

## 2024-04-09 NOTE — LETTER
April 9, 2024     Suzanna Lopez MD  [address] [facility] [address]    Patient: Rosalind Wise   YOB: 1940   Date of Visit: 4/9/2024       Dear Dr. Lopez:    Thank you for referring Rosalind Wise to me for evaluation. Below are my notes for this consultation.    If you have questions, please do not hesitate to call me. I look forward to following your patient along with you.         Sincerely,        Ele Miller MD        CC: No Recipients    Ele Miller MD  4/9/2024 11:24 AM  Sign when Signing Visit  NEPHROLOGY OFFICE VISIT   Rosalind Wise 83 y.o. female MRN: [id number]  4/9/2024    Reason for Visit: CKD III    ASSESSMENT and PLAN:    I had the pleasure of seeing Ms Wise today in the renal clinic for the continued management of CKD III.     2/2020 - February with 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 on February 11th.  Completed course of Keppra for seizure prophylaxis.     3/30/2020 - losartan was increased to 50 mg twice a day from 50 in the morning and 25 in the evening due to high blood pressures     4/3/2020 - spironolactone 12.5 mg daily was started due to continued high blood pressure     4/2020 - patient had presented for replacement current of cranioplasty on April 6.  And subsequently was admitted to the rehab center postoperatively      March 2021- patient was admitted to hospital with not feeling well and   Headache.  CT scan of the head with no acute intracranial pathology. The other symptoms of memory loss were also present.  Patient was referred to geriatrician.     April 2021 - sciatic pain. Received brief steroid course     5/2021 - back pain. Went to ER. Given pred taper     5/2023 - neck pain.CT of spine -  no cervical spine fracture or traumatic malalignmen      October 2023-patient was in the ER for dizziness and headache.  Patient was given normal saline.  Had a CAT scan completed.  CT of the head no  acute intracranial abnormalities with unchanged large chronic encephalomalacia and gliosis in the right temporal lobe in the region of remote hemorrhage.  Chest x-ray was unrevealing.  Patient was discharged after volume expansion and feeling improved.     October 27, 2023-patient to follow-up with cardiology team.  For now mitral valve is being monitored. CT abdpel - no acute findings. Potential gastritis    March 26, 2024-patient had intentional overdose of prescribed medications and was admitted to the hospital.  Had ingested Zofran, Atarax, Plavix, Benadryl, Tylenol cold and flu tablets.  Received N-acetylcysteine.  Had signs of anticholinergic effects.  With regards to patient's hypertension, metoprolol was increased to 25 mg twice daily especially given that the patient was in A-fib with RVR requiring Cardizem drip.  Patient was admitted after discharge from medicine team to the psychiatric team for further mental health care.  Patient was started on Lexapro.  Was discharged in stable and improving condition..     83-year-old female with a past medical history of chronic kidney disease, venous stasis, HTN, hypothyroidism, lumbar canal stenosis, Anxiety, GERD, WENDI, neuropathy, Gout, EF 50-55%, Grade 2 diastolic dysfunction who presents for follow up for CKD. To note, patient's  has now passed away in August.     1) CKD III - patient also has a long standing history of HTN. Creatinine in 2013, 0.9 mg/dL. Cr early 2016 was 0.80-0.9 mg/dL; then increased starting in august 2016 to 2.3 mg/dL and has fluctuated since. CT scan in 2016, kidneys appearing normal at that time.      Etiology of CKD may be long standing HTN and ATN. Baseline Cr ~ 1.1 -1.3 mg/dL     - Urine eos 0%;   - A1c controlled prior  at 5.6% 12/2022  - UPCR too low to quant (6/2023)  - UA bland 6/2023  -SPEP unrevealing  -UPEP unrevealing  - C3, C4 unrevealing  - Renal u/s with R 10.3 cm, L 10.4 cm, renal cortex 1 cm b/l; both kidneys  slightly reduced in size; lower pole of R kidney is non obstructing calculus  - 10/2023 - CT without hydro on kidney  - Pt follows with Urology team for nephrolithiasis.   - No NSAIDs, the patient is not currently taking NSAID  -nuc stress test per Cardiology in Jan 2019 unrevealing  - repeat renal u/s 1/2019 - unrevealing with exception of renal cortical atrophy; but also 6 mm non shadowing calculus.     Overall, patient is seen for appointment for hospital follow-up.  Patient was admitted for suicidal ideation.  During hospitalization, metoprolol was increased to 25 mg twice daily for A-fib with RVR.  It appears amlodipine was held on discharge.  Metoprolol was reduced back to 12 and half milligrams twice daily.  I am not sure why amlodipine was held but the blood pressures are stable today and appropriate 130 systolic.  Her weight has been decreasing and her blood pressures may be better controlled with decrease in weight.  She states that she is now eating better again.  Her volume state is euvolemic on 1 mg of Bumex a day therefore I did not make adjustment today.     Plan:    - pt states she is taking metoprolol 12.5 mg BID (was taking 25 mg BID in hosp due to a fib with RVR) for now I will not changes  - Continue holding amlodipine for now  - Continue spironolactone 12-1/2 mg daily  - Continue losartan 50 mg in the morning and 25 mg in the evening  - Continue Bumex 1 mg once a day  - Lab work in 2 months  - Appointment in 2 months  - Overall, patient expressed significant concerns regarding her mental health that she is feeling sad.  She was tearful in the office.  She has no suicidal or homicidal ideations.  She has no thoughts also of hurting herself or plans.  She states that she is just feeling very sad.  She feels that her family is blaming her for what happened regarding the suicide attempt 1 month ago.  She is attempting to find things that bring her zelda such as meeting up with her friend today to help  her make bread.  I reached out to her psychiatrist to help the patient's schedule follow-up appointment as the patient states that she does not use her MyChart and her family does on her behalf.  Therefore I have reached out to his psychiatrist who saw the patient in the hospital for follow-up appointment.  Patient knows to call right away if she develops any suicidal ideations or thoughts and to go right to the ER.     2) SOB - Volume - follows with Cardiology     - on Bumex.  Will lower slightly to 1 mg once a day on December 15, 2023 as the patient may be becoming hypovolemic and patient is euvolemic.     3) HTN -      - cont losartan, bumex and amlodipine, spironolactone  -  due to bradycardia, beta-blocker was held prior but given new onset atrial fibrillation around May 2022, patient was restarted on metoprolol per Cardiology team  - December 2023-lower Bumex to 1 mg once daily due to concern for hypovolemia  - March 2024-it appears that the patient's amlodipine was held in the hospital.  Blood pressures are appropriate April 9, 2024 at appointment.     4) hypothyroid - as per Primary Care Physician     5) HPL - Primary Care following.      6) Electrolytes - stable     7) MBD -     - Vit D 50.7 in nov 2019 --> 40.8 March 2022  - PTH improved 89 in nov 2019 --> 78.6 in June 2020 --> 88 3/8/2022 --> 136 December 2022 -->109 December 2023.  Appropriate for now.     8) gout -      -monitor for flare     9) back pain - follows with Pain management     - pain sig improved with inj     10) Colonoscopy in feb with internal hemorrhoids and polyp removed.      11) alk phos elevation     -improved     12) Anemia - Hb stable     -hemoglobin stable and at goal     13) Mitral valve calcification     - moderate to severe mitral regurgitation  -eventually may require further evaluation.  Being monitored for now conservatively.  - per Cardiology team     14) elevated D-dimer prior-patient was given duplex of lower extremities  which was unrevealing prior     15) knee pain after fall in august 2020     - saw Orthopedic team  - steroid injection was given     16) intracranial hemorrhage with mass effect requiring craniotomy on 2/11/2020     - now is status post replacement of cranioplasty in April 2020     17)  Anxiety- patient was started on Cymbalta. PCP is attempting off of gabapentin     - there were periods of not taking cymbalta     18)   Zoster infection in November 2020-treated with Valtrex per primary team     19) a fib - new onset in May 2022     -was advised to have Holter monitor.  There is concern for tachy-hola syndrome initially and Holter monitor showed periods of atrial fibrillation with RVR and nonsustained ventricular tachycardia.  Was started on low-dose beta-blocker with metoprolol.  -in July metoprolol was titrated further to 25 mg twice a day  -was started on Eliquis 2.5 mg twice a day.  Lower dose was chosen due to high risk factors.  - Patient is currently on metoprolol 12 and half milligrams twice daily.  I did not make changes to this today.  I reached out to the cardiologist to discuss case.       It was a pleasure evaluating your patient in the office today. Thank you for allowing our team to participate in the care of Ms Rosalind Wise. Please do not hesitate to contact our team if further issues/questions shall arise in the interim.     No problem-specific Assessment & Plan notes found for this encounter.      I have spent a total time of 42 minutes on 04/09/24 in caring for this patient including     HPI:    Patient denies complaints with the exception of feeling very sad.  She is tearful.    PATIENT INSTRUCTIONS:    Patient Instructions   1) Avoid NSAIDS - (Example - motrin, advil, ibuprofen, aleve, exederin, etc)  2) Always follow a low salt diet  3) If you have any issues with trouble with nausea, vomiting, urinating, blood in the urine, food tasting like metal, confusion, weakness, fatigue that is  worsening, or any other questions, please call right away.  4) Please continue to follow regularly with your family physician and any other specialist that you are advised to see and continue to follow their recommendations  5) please have labwork in 2 months before your appt  6) no changes to your medications   7) please call in two weeks with your blood pressure readings        OBJECTIVE:  Current Weight: Weight - Scale: 67.6 kg (149 lb)  Vitals:    04/09/24 1024 04/09/24 1109   BP:  130/60   Pulse:  82   Weight: 67.6 kg (149 lb)    Height: 5' (1.524 m)     Body mass index is 29.1 kg/m².      REVIEW OF SYSTEMS:    Review of Systems   Constitutional: Negative.  Negative for fatigue.   HENT: Negative.     Eyes: Negative.    Respiratory: Negative.  Negative for shortness of breath.    Cardiovascular: Negative.  Negative for leg swelling.   Gastrointestinal: Negative.    Endocrine: Negative.    Genitourinary: Negative.  Negative for difficulty urinating.   Musculoskeletal: Negative.    Skin: Negative.    Allergic/Immunologic: Negative.    Neurological: Negative.    Hematological: Negative.    Psychiatric/Behavioral:          Depressed mood   All other systems reviewed and are negative.      PHYSICAL EXAM:      Physical Exam  Vitals and nursing note reviewed.   Constitutional:       General: She is not in acute distress.     Appearance: She is well-developed. She is not diaphoretic.   HENT:      Head: Normocephalic and atraumatic.   Eyes:      General: No scleral icterus.        Right eye: No discharge.         Left eye: No discharge.      Conjunctiva/sclera: Conjunctivae normal.   Neck:      Vascular: No JVD.   Cardiovascular:      Rate and Rhythm: Normal rate and regular rhythm.      Heart sounds: No murmur heard.     No friction rub. No gallop.   Pulmonary:      Effort: Pulmonary effort is normal. No respiratory distress.      Breath sounds: Normal breath sounds. No wheezing or rales.   Abdominal:      General:  Bowel sounds are normal. There is no distension.      Palpations: Abdomen is soft.      Tenderness: There is no abdominal tenderness. There is no rebound.   Musculoskeletal:         General: No tenderness or deformity. Normal range of motion.      Cervical back: Normal range of motion and neck supple.   Skin:     General: Skin is warm and dry.      Coloration: Skin is not pale.      Findings: No erythema or rash.   Neurological:      Mental Status: She is alert and oriented to person, place, and time.      Coordination: Coordination normal.   Psychiatric:         Behavior: Behavior normal.         Thought Content: Thought content normal.         Judgment: Judgment normal.         Medications:    Current Outpatient Medications:   •  apixaban (Eliquis) 2.5 mg, Take 1 tablet (2.5 mg total) by mouth 2 (two) times a day, Disp: 180 tablet, Rfl: 3  •  atorvastatin (LIPITOR) 40 mg tablet, Take 1 tablet (40 mg total) by mouth every evening, Disp: 90 tablet, Rfl: 1  •  bumetanide (BUMEX) 1 mg tablet, Take 1 tablet (1 mg total) by mouth daily, Disp: 90 tablet, Rfl: 3  •  Diclofenac Sodium (VOLTAREN) 1 %, Apply 2 g topically in the morning APPLY NO MORE THAN 3 DAYS IN A ROW THEN TAKE A BREAK FOR ONE WEEK FROM USE., Disp: 100 g, Rfl: 1  •  escitalopram (LEXAPRO) 5 mg tablet, Take 1 tablet (5 mg total) by mouth daily, Disp: 30 tablet, Rfl: 0  •  levothyroxine 88 mcg tablet, TAKE 1 TABLET EVERY DAY, Disp: 90 tablet, Rfl: 1  •  losartan (COZAAR) 25 mg tablet, TAKE 2 TABLETS IN THE MORNING AND TAKE 1 TABLET IN THE EVENING, Disp: 270 tablet, Rfl: 10  •  metoprolol tartrate (LOPRESSOR) 25 mg tablet, Take 0.5 tablets (12.5 mg total) by mouth every 12 (twelve) hours, Disp: 45 tablet, Rfl: 1  •  pantoprazole (PROTONIX) 20 mg tablet, TAKE 1 TABLET EVERY DAY, Disp: 90 tablet, Rfl: 1  •  QUEtiapine (SEROquel) 25 mg tablet, Take 1 tablet (25 mg total) by mouth daily at bedtime, Disp: 30 tablet, Rfl: 0  •  senna-docusate sodium (SENOKOT S)  "8.6-50 mg per tablet, Take 1 tablet by mouth daily, Disp: 20 tablet, Rfl: 1  •  spironolactone (ALDACTONE) 25 mg tablet, Take 0.5 tablets (12.5 mg total) by mouth daily, Disp: 45 tablet, Rfl: 3    Laboratory Results:        Invalid input(s): \"ALBUMIN\"    Results for orders placed or performed during the hospital encounter of 03/26/24   Comprehensive metabolic panel   Result Value Ref Range    Sodium 141 135 - 147 mmol/L    Potassium 4.2 3.5 - 5.3 mmol/L    Chloride 100 96 - 108 mmol/L    CO2 32 21 - 32 mmol/L    ANION GAP 9 4 - 13 mmol/L    BUN 31 (H) 5 - 25 mg/dL    Creatinine 1.12 0.60 - 1.30 mg/dL    Glucose 115 65 - 140 mg/dL    Glucose, Fasting 115 (H) 65 - 99 mg/dL    Calcium 9.4 8.4 - 10.2 mg/dL    AST 10 (L) 13 - 39 U/L    ALT 9 7 - 52 U/L    Alkaline Phosphatase 72 34 - 104 U/L    Total Protein 6.6 6.4 - 8.4 g/dL    Albumin 3.8 3.5 - 5.0 g/dL    Total Bilirubin 0.77 0.20 - 1.00 mg/dL    eGFR 45 ml/min/1.73sq m   Magnesium   Result Value Ref Range    Magnesium 1.7 (L) 1.9 - 2.7 mg/dL   Phosphorus   Result Value Ref Range    Phosphorus 2.6 2.3 - 4.1 mg/dL   CBC and differential   Result Value Ref Range    WBC 9.30 4.31 - 10.16 Thousand/uL    RBC 4.38 3.81 - 5.12 Million/uL    Hemoglobin 13.1 11.5 - 15.4 g/dL    Hematocrit 38.8 34.8 - 46.1 %    MCV 89 82 - 98 fL    MCH 29.9 26.8 - 34.3 pg    MCHC 33.8 31.4 - 37.4 g/dL    RDW 14.4 11.6 - 15.1 %    MPV 11.1 8.9 - 12.7 fL    Platelets 214 149 - 390 Thousands/uL    nRBC 0 /100 WBCs    Segmented % 81 (H) 43 - 75 %    Immature Grans % 0 0 - 2 %    Lymphocytes % 9 (L) 14 - 44 %    Monocytes % 9 4 - 12 %    Eosinophils Relative 1 0 - 6 %    Basophils Relative 0 0 - 1 %    Absolute Neutrophils 7.44 1.85 - 7.62 Thousands/µL    Absolute Immature Grans 0.04 0.00 - 0.20 Thousand/uL    Absolute Lymphocytes 0.86 0.60 - 4.47 Thousands/µL    Absolute Monocytes 0.84 0.17 - 1.22 Thousand/µL    Eosinophils Absolute 0.09 0.00 - 0.61 Thousand/µL    Basophils Absolute 0.03 0.00 - " 0.10 Thousands/µL   TSH, 3rd generation with Free T4 reflex   Result Value Ref Range    TSH 3RD GENERATON 2.103 0.450 - 4.500 uIU/mL   Vitamin D 25 hydroxy   Result Value Ref Range    Vit D, 25-Hydroxy 31.7 30.0 - 100.0 ng/mL   Lipid panel   Result Value Ref Range    Cholesterol 105 See Comment mg/dL    Triglycerides 78 See Comment mg/dL    HDL, Direct 54 >=50 mg/dL    LDL Calculated 35 0 - 100 mg/dL    Non-HDL-Chol (CHOL-HDL) 51 mg/dl   Hemoglobin A1C   Result Value Ref Range    Hemoglobin A1C 6.0 (H) Normal 4.0-5.6%; PreDiabetic 5.7-6.4%; Diabetic >=6.5%; Glycemic control for adults with diabetes <7.0% %     mg/dl   ECG 12 lead   Result Value Ref Range    Ventricular Rate 0 BPM    Atrial Rate 0 BPM    VT Interval  ms    QRSD Interval 0 ms    QT Interval 0 ms    QTC Interval 0 ms    P Axis  degrees    QRS Axis 0 degrees    T Wave Axis 0 degrees   ECG 12 lead   Result Value Ref Range    Ventricular Rate 86 BPM    Atrial Rate 101 BPM    VT Interval  ms    QRSD Interval 122 ms    QT Interval 410 ms    QTC Interval 490 ms    P Axis  degrees    QRS Axis 90 degrees    T Wave Axis -21 degrees

## 2024-04-09 NOTE — TELEPHONE ENCOUNTER
Call to Rosalind (home number)-left message on answering machine re: older adult group, requesting call back for more information if interested.

## 2024-04-10 ENCOUNTER — TELEPHONE (OUTPATIENT)
Dept: PSYCHIATRY | Facility: CLINIC | Age: 84
End: 2024-04-10

## 2024-04-10 NOTE — TELEPHONE ENCOUNTER
Patient called the office in regards to the call she received yesterday. She stated at this time she is not ready for group at this time and will call back when she is.

## 2024-04-12 ENCOUNTER — TELEPHONE (OUTPATIENT)
Age: 84
End: 2024-04-12

## 2024-04-12 NOTE — TELEPHONE ENCOUNTER
Patient called per Dr. Miller. She gave me her BP readings for the last 3 days:    4/10: 121/62 in the morning with a pulse of 86, 123/74 in the evening with a pulse of 85.    4/11: 127/76 in the morning with a  pulse of 96    4/12: 110/75 when she woke up with a pulse of 84, 119/74 right now with a pulse of 85.    Please call [phone] with any questions or concerns.

## 2024-04-17 ENCOUNTER — OFFICE VISIT (OUTPATIENT)
Dept: FAMILY MEDICINE CLINIC | Facility: CLINIC | Age: 84
End: 2024-04-17
Payer: MEDICARE

## 2024-04-17 VITALS
DIASTOLIC BLOOD PRESSURE: 72 MMHG | OXYGEN SATURATION: 97 % | SYSTOLIC BLOOD PRESSURE: 125 MMHG | HEIGHT: 60 IN | BODY MASS INDEX: 28.47 KG/M2 | WEIGHT: 145 LBS | HEART RATE: 84 BPM

## 2024-04-17 DIAGNOSIS — T14.91XA SUICIDE ATTEMPT (HCC): ICD-10-CM

## 2024-04-17 DIAGNOSIS — F33.1 MODERATE EPISODE OF RECURRENT MAJOR DEPRESSIVE DISORDER (HCC): ICD-10-CM

## 2024-04-17 PROCEDURE — G2211 COMPLEX E/M VISIT ADD ON: HCPCS | Performed by: FAMILY MEDICINE

## 2024-04-17 PROCEDURE — 99214 OFFICE O/P EST MOD 30 MIN: CPT | Performed by: FAMILY MEDICINE

## 2024-04-17 RX ORDER — QUETIAPINE FUMARATE 25 MG/1
25 TABLET, FILM COATED ORAL
Qty: 30 TABLET | Refills: 1 | Status: SHIPPED | OUTPATIENT
Start: 2024-04-17

## 2024-04-17 RX ORDER — QUETIAPINE FUMARATE 25 MG/1
25 TABLET, FILM COATED ORAL
Qty: 30 TABLET | Refills: 1 | Status: SHIPPED | OUTPATIENT
Start: 2024-04-17 | End: 2024-04-17 | Stop reason: SDUPTHER

## 2024-04-17 NOTE — ASSESSMENT & PLAN NOTE
Patient following up for anxiety and depression.  Was started on Seroquel 25 mg for management of the symptoms and also to help with chronic insomnia.  Patient reports mild improvement in her symptoms.  Sleep has slightly improved now.  Her mood is slightly stable.  Patient denies any suicidal thoughts or ideation or plan.  I have recommended her to continue the current dose.  Also recommended to try an adult  program so she is physically and mentally involved in the program.  Discussed this with her daughter-in-law who is on board, and willing to try anything that would help improve her mental health.

## 2024-04-17 NOTE — PROGRESS NOTES
Subjective:      Patient ID: Rosalind Wise is a 83 y.o. female.    HPI    Patient is following up from hospital discharge.  Admitted for suicide attempt with intentional overdose on medications.  Patient states that she has been feeling sad and depressed since her   a few years ago.  She moved in with her son's family, but states that she is experiencing social isolation.   Patient reports anger and frustration over multiple issues including the death of her , and the changes that occurred in her life there after.  Patient experiences difficulty falling asleep because of her anxiety.  Was started on Seroquel to see if that helps with her symptoms.  Patient is reporting mild improvement in symptoms.  Family tries to get her socially involved by helping her meet her friends and other family that she could relate to.  Patient is very active in the house with cooking and managing her chores.      Past Medical History:   Diagnosis Date    Anxiety     Arthritis     joints    Bunion     Cataract     Disease of thyroid gland     Eczema     GERD (gastroesophageal reflux disease)     Gout     Heart failure (HCC)     Hepatitis     Hiatal hernia     Hypertension     Onychomycosis     last assessed: 16    Orthopnea     resolved: 16    Pseudogout of left wrist     Sleep apnea     wears CPAP    Stroke (HCC)        Family History   Problem Relation Age of Onset    Diabetes Mother     Coronary artery disease Mother     Arthritis Mother     Hypertension Mother     Hypertension Father     Diabetes Brother     Diabetes Son     Arthritis Family        Past Surgical History:   Procedure Laterality Date    ABDOMINAL SURGERY          BRAIN HEMATOMA EVACUATION Right 2020    Procedure: Right decompressive craniectomy and evacuation of intraparenchymal hematoma;  Surgeon: Apolinar Herrera MD;  Location: [facility];  Service: Neurosurgery    BREAST SURGERY Right     cyst removal    CARPAL TUNNEL  RELEASE Left     CARPAL TUNNEL RELEASE Right     CATARACT EXTRACTION       SECTION  1960    x1    COLONOSCOPY N/A 2018    Procedure: COLONOSCOPY;  Surgeon: Alejandro Carlson MD;  Location: [facility];  Service: Gastroenterology    DILATION AND CURETTAGE OF UTERUS  1967    EYE SURGERY Left 2006    cataracts    HERNIA REPAIR      umbilical hernia    INCISIONAL HERNIA REPAIR      incarcerated    KNEE ARTHROSCOPY Right     NC RPLCMT BONE FLAP/PROSTHETIC PLATE SKULL Right 2020    Procedure: right frontotemporal replacement cranioplasty;  Surgeon: Apolinar Herrera MD;  Location:  MAIN OR;  Service: Neurosurgery    NC XCAPSL CTRC RMVL INSJ IO LENS PROSTH W/O ECP Right 3/27/2017    Procedure: EXTRACTION EXTRACAPSULAR CATARACT PHACO INTRAOCULAR LENS (IOL);  Surgeon: Trip Gilbert MD;  Location: [facility];  Service: Ophthalmology        reports that she has never smoked. She has never used smokeless tobacco. She reports current alcohol use. She reports that she does not use drugs.      Current Outpatient Medications:     apixaban (Eliquis) 2.5 mg, Take 1 tablet (2.5 mg total) by mouth 2 (two) times a day, Disp: 180 tablet, Rfl: 3    atorvastatin (LIPITOR) 40 mg tablet, Take 1 tablet (40 mg total) by mouth every evening, Disp: 90 tablet, Rfl: 1    bumetanide (BUMEX) 1 mg tablet, Take 1 tablet (1 mg total) by mouth daily, Disp: 90 tablet, Rfl: 3    Diclofenac Sodium (VOLTAREN) 1 %, Apply 2 g topically in the morning APPLY NO MORE THAN 3 DAYS IN A ROW THEN TAKE A BREAK FOR ONE WEEK FROM USE., Disp: 100 g, Rfl: 1    escitalopram (LEXAPRO) 5 mg tablet, Take 1 tablet (5 mg total) by mouth daily, Disp: 30 tablet, Rfl: 0    levothyroxine 88 mcg tablet, TAKE 1 TABLET EVERY DAY, Disp: 90 tablet, Rfl: 1    losartan (COZAAR) 25 mg tablet, TAKE 2 TABLETS IN THE MORNING AND TAKE 1 TABLET IN THE EVENING, Disp: 270 tablet, Rfl: 10    metoprolol tartrate (LOPRESSOR) 25 mg tablet, Take 0.5 tablets (12.5 mg total)  by mouth every 12 (twelve) hours, Disp: 45 tablet, Rfl: 1    pantoprazole (PROTONIX) 20 mg tablet, TAKE 1 TABLET EVERY DAY, Disp: 90 tablet, Rfl: 1    QUEtiapine (SEROquel) 25 mg tablet, Take 1 tablet (25 mg total) by mouth daily at bedtime, Disp: 30 tablet, Rfl: 0    senna-docusate sodium (SENOKOT S) 8.6-50 mg per tablet, Take 1 tablet by mouth daily, Disp: 20 tablet, Rfl: 1    spironolactone (ALDACTONE) 25 mg tablet, Take 0.5 tablets (12.5 mg total) by mouth daily, Disp: 45 tablet, Rfl: 3    The following portions of the patient's history were reviewed and updated as appropriate: allergies, current medications, past family history, past medical history, past social history, past surgical history and problem list.    Review of Systems        Objective:    /72   Pulse 84   Ht 5' (1.524 m)   Wt 65.8 kg (145 lb)   SpO2 97%   BMI 28.32 kg/m²      Physical Exam  Constitutional:       Appearance: Normal appearance.   Cardiovascular:      Heart sounds: Normal heart sounds.   Pulmonary:      Breath sounds: Normal breath sounds.   Neurological:      Mental Status: She is oriented to person, place, and time.   Psychiatric:      Comments: Tearful, when she talks about her past.   Appears angry and frustrated. Says that she will never commit suicide again.            Recent Results (from the past 1008 hour(s))   ECG 12 lead    Collection Time: 03/22/24  6:07 PM   Result Value Ref Range    Ventricular Rate 133 BPM    Atrial Rate 131 BPM    NM Interval  ms    QRSD Interval 116 ms    QT Interval 348 ms    QTC Interval 517 ms    P Axis  degrees    QRS Axis 109 degrees    T Wave Axis -35 degrees   Fingerstick Glucose (POCT)    Collection Time: 03/22/24  6:08 PM   Result Value Ref Range    POC Glucose 130 65 - 140 mg/dl   CBC and differential    Collection Time: 03/22/24  6:26 PM   Result Value Ref Range    WBC 9.75 4.31 - 10.16 Thousand/uL    RBC 4.36 3.81 - 5.12 Million/uL    Hemoglobin 12.3 11.5 - 15.4 g/dL     "Hematocrit 39.4 34.8 - 46.1 %    MCV 90 82 - 98 fL    MCH 28.2 26.8 - 34.3 pg    MCHC 31.2 (L) 31.4 - 37.4 g/dL    RDW 14.4 11.6 - 15.1 %    MPV 11.1 8.9 - 12.7 fL    Platelets 173 149 - 390 Thousands/uL   Comprehensive metabolic panel    Collection Time: 03/22/24  6:26 PM   Result Value Ref Range    Sodium 137 135 - 147 mmol/L    Potassium 4.1 3.5 - 5.3 mmol/L    Chloride 100 96 - 108 mmol/L    CO2 27 21 - 32 mmol/L    ANION GAP 10 4 - 13 mmol/L    BUN 33 (H) 5 - 25 mg/dL    Creatinine 1.24 0.60 - 1.30 mg/dL    Glucose 135 65 - 140 mg/dL    Calcium 9.6 8.4 - 10.2 mg/dL    AST 18 13 - 39 U/L    ALT 12 7 - 52 U/L    Alkaline Phosphatase 102 34 - 104 U/L    Total Protein 6.9 6.4 - 8.4 g/dL    Albumin 4.4 3.5 - 5.0 g/dL    Total Bilirubin 0.79 0.20 - 1.00 mg/dL    eGFR 39 ml/min/1.73sq m   TSH, 3rd generation with Free T4 reflex    Collection Time: 03/22/24  6:26 PM   Result Value Ref Range    TSH 3RD GENERATON 4.099 0.450 - 4.500 uIU/mL   HS Troponin 0hr (reflex protocol)    Collection Time: 03/22/24  6:26 PM   Result Value Ref Range    hs TnI 0hr 25 \"Refer to ACS Flowchart\"- see link ng/L   Ammonia    Collection Time: 03/22/24  6:26 PM   Result Value Ref Range    Ammonia 15 (L) 18 - 72 umol/L   Lactic acid, plasma (w/reflex if result > 2.0)    Collection Time: 03/22/24  6:26 PM   Result Value Ref Range    LACTIC ACID 1.5 0.5 - 2.0 mmol/L   Ethanol    Collection Time: 03/22/24  6:26 PM   Result Value Ref Range    Ethanol Lvl <10 <10 mg/dL   Salicylate level    Collection Time: 03/22/24  6:26 PM   Result Value Ref Range    Salicylate Lvl <5 3 - 20 mg/dL   Acetaminophen level-If concentration is detectable, please discuss with medical  on call.    Collection Time: 03/22/24  6:26 PM   Result Value Ref Range    Acetaminophen Level 26 (HH) 10 - 20 ug/mL   Manual Differential(PHLEBS Do Not Order)    Collection Time: 03/22/24  6:26 PM   Result Value Ref Range    Segmented % 86 (H) 43 - 75 %    Bands % 2 0 - 8 % " "   Lymphocytes % 8 (L) 14 - 44 %    Monocytes % 4 4 - 12 %    Eosinophils % 0 0 - 6 %    Basophils % 0 0 - 1 %    Absolute Neutrophils 8.58 (H) 1.85 - 7.62 Thousand/uL    Absolute Lymphocytes 0.78 0.60 - 4.47 Thousand/uL    Absolute Monocytes 0.39 0.00 - 1.22 Thousand/uL    Absolute Eosinophils 0.00 0.00 - 0.40 Thousand/uL    Absolute Basophils 0.00 0.00 - 0.10 Thousand/uL    Total Counted      RBC Morphology Present     Platelet Estimate Adequate Adequate    Acanthocytes Present     Mirza Cells Present     Ovalocytes Present     Poikilocytes Present    ECG 12 lead    Collection Time: 03/22/24  7:01 PM   Result Value Ref Range    Ventricular Rate 130 BPM    Atrial Rate 159 BPM    IL Interval  ms    QRSD Interval 126 ms    QT Interval 360 ms    QTC Interval 529 ms    P Axis  degrees    QRS Axis 120 degrees    T Wave Axis -39 degrees   Blood gas, venous    Collection Time: 03/22/24  7:55 PM   Result Value Ref Range    pH, Jack 7.322 7.300 - 7.400    pCO2, Jack 52.1 (H) 42.0 - 50.0 mm Hg    pO2, Jack 36.1 35.0 - 45.0 mm Hg    HCO3, Jack 26.4 24 - 30 mmol/L    Base Excess, Jack -0.4 mmol/L    O2 Content, Jack 12.5 ml/dL    O2 HGB, VENOUS 66.5 60.0 - 80.0 %   HS Troponin I 2hr    Collection Time: 03/22/24  7:55 PM   Result Value Ref Range    hs TnI 2hr 26 \"Refer to ACS Flowchart\"- see link ng/L    Delta 2hr hsTnI 1 <20 ng/L   HS Troponin I 4hr    Collection Time: 03/22/24 10:13 PM   Result Value Ref Range    hs TnI 4hr 26 \"Refer to ACS Flowchart\"- see link ng/L    Delta 4hr hsTnI 1 <20 ng/L   CBC (With Platelets)    Collection Time: 03/23/24  5:01 AM   Result Value Ref Range    WBC 10.41 (H) 4.31 - 10.16 Thousand/uL    RBC 4.08 3.81 - 5.12 Million/uL    Hemoglobin 11.6 11.5 - 15.4 g/dL    Hematocrit 36.7 34.8 - 46.1 %    MCV 90 82 - 98 fL    MCH 28.4 26.8 - 34.3 pg    MCHC 31.6 31.4 - 37.4 g/dL    RDW 14.4 11.6 - 15.1 %    Platelets 169 149 - 390 Thousands/uL    MPV 11.2 8.9 - 12.7 fL   Comprehensive metabolic panel    " Collection Time: 03/23/24  5:01 AM   Result Value Ref Range    Sodium 136 135 - 147 mmol/L    Potassium 3.3 (L) 3.5 - 5.3 mmol/L    Chloride 101 96 - 108 mmol/L    CO2 25 21 - 32 mmol/L    ANION GAP 10 4 - 13 mmol/L    BUN 27 (H) 5 - 25 mg/dL    Creatinine 1.05 0.60 - 1.30 mg/dL    Glucose 143 (H) 65 - 140 mg/dL    Calcium 8.7 8.4 - 10.2 mg/dL    AST 13 13 - 39 U/L    ALT 11 7 - 52 U/L    Alkaline Phosphatase 81 34 - 104 U/L    Total Protein 6.3 (L) 6.4 - 8.4 g/dL    Albumin 3.8 3.5 - 5.0 g/dL    Total Bilirubin 0.57 0.20 - 1.00 mg/dL    eGFR 48 ml/min/1.73sq m   CBC    Collection Time: 03/24/24  2:40 AM   Result Value Ref Range    WBC 10.62 (H) 4.31 - 10.16 Thousand/uL    RBC 4.04 3.81 - 5.12 Million/uL    Hemoglobin 11.6 11.5 - 15.4 g/dL    Hematocrit 35.9 34.8 - 46.1 %    MCV 89 82 - 98 fL    MCH 28.7 26.8 - 34.3 pg    MCHC 32.3 31.4 - 37.4 g/dL    RDW 14.6 11.6 - 15.1 %    Platelets 157 149 - 390 Thousands/uL    MPV 10.9 8.9 - 12.7 fL   Comprehensive metabolic panel    Collection Time: 03/24/24  2:40 AM   Result Value Ref Range    Sodium 137 135 - 147 mmol/L    Potassium 3.4 (L) 3.5 - 5.3 mmol/L    Chloride 105 96 - 108 mmol/L    CO2 23 21 - 32 mmol/L    ANION GAP 9 4 - 13 mmol/L    BUN 25 5 - 25 mg/dL    Creatinine 1.11 0.60 - 1.30 mg/dL    Glucose 93 65 - 140 mg/dL    Calcium 8.9 8.4 - 10.2 mg/dL    AST 12 (L) 13 - 39 U/L    ALT 10 7 - 52 U/L    Alkaline Phosphatase 78 34 - 104 U/L    Total Protein 6.1 (L) 6.4 - 8.4 g/dL    Albumin 3.7 3.5 - 5.0 g/dL    Total Bilirubin 0.51 0.20 - 1.00 mg/dL    eGFR 45 ml/min/1.73sq m   Basic metabolic panel    Collection Time: 03/25/24  6:56 AM   Result Value Ref Range    Sodium 140 135 - 147 mmol/L    Potassium 3.1 (L) 3.5 - 5.3 mmol/L    Chloride 104 96 - 108 mmol/L    CO2 28 21 - 32 mmol/L    ANION GAP 8 4 - 13 mmol/L    BUN 23 5 - 25 mg/dL    Creatinine 0.96 0.60 - 1.30 mg/dL    Glucose 104 65 - 140 mg/dL    Calcium 8.9 8.4 - 10.2 mg/dL    eGFR 54 ml/min/1.73sq m   CBC     Collection Time: 03/25/24  6:56 AM   Result Value Ref Range    WBC 10.68 (H) 4.31 - 10.16 Thousand/uL    RBC 4.02 3.81 - 5.12 Million/uL    Hemoglobin 11.5 11.5 - 15.4 g/dL    Hematocrit 35.5 34.8 - 46.1 %    MCV 88 82 - 98 fL    MCH 28.6 26.8 - 34.3 pg    MCHC 32.4 31.4 - 37.4 g/dL    RDW 14.5 11.6 - 15.1 %    Platelets 164 149 - 390 Thousands/uL    MPV 11.1 8.9 - 12.7 fL   Basic metabolic panel    Collection Time: 03/26/24  7:47 AM   Result Value Ref Range    Sodium 140 135 - 147 mmol/L    Potassium 3.6 3.5 - 5.3 mmol/L    Chloride 103 96 - 108 mmol/L    CO2 28 21 - 32 mmol/L    ANION GAP 9 4 - 13 mmol/L    BUN 24 5 - 25 mg/dL    Creatinine 0.88 0.60 - 1.30 mg/dL    Glucose 99 65 - 140 mg/dL    Calcium 9.0 8.4 - 10.2 mg/dL    eGFR 60 ml/min/1.73sq m   Urinalysis with microscopic    Collection Time: 03/26/24 11:18 AM   Result Value Ref Range    Color, UA Light Yellow     Clarity, UA Clear     Specific Gravity, UA 1.009 1.003 - 1.030    pH, UA 5.0 4.5, 5.0, 5.5, 6.0, 6.5, 7.0, 7.5, 8.0    Leukocytes, UA Trace (A) Negative    Nitrite, UA Negative Negative    Protein, UA Negative Negative mg/dl    Glucose, UA Negative Negative mg/dl    Ketones, UA Negative Negative mg/dl    Urobilinogen, UA <2.0 <2.0 mg/dl mg/dl    Bilirubin, UA Negative Negative    Occult Blood, UA Negative Negative    RBC, UA 1-2 None Seen, 1-2 /hpf    WBC, UA 1-2 None Seen, 1-2 /hpf    Epithelial Cells None Seen None Seen, Occasional /hpf    Bacteria, UA Occasional None Seen, Occasional /hpf    Hyaline Casts, UA 0-3 (A) None Seen /lpf   Comprehensive metabolic panel    Collection Time: 03/27/24  5:51 AM   Result Value Ref Range    Sodium 141 135 - 147 mmol/L    Potassium 4.2 3.5 - 5.3 mmol/L    Chloride 100 96 - 108 mmol/L    CO2 32 21 - 32 mmol/L    ANION GAP 9 4 - 13 mmol/L    BUN 31 (H) 5 - 25 mg/dL    Creatinine 1.12 0.60 - 1.30 mg/dL    Glucose 115 65 - 140 mg/dL    Glucose, Fasting 115 (H) 65 - 99 mg/dL    Calcium 9.4 8.4 - 10.2 mg/dL    AST  10 (L) 13 - 39 U/L    ALT 9 7 - 52 U/L    Alkaline Phosphatase 72 34 - 104 U/L    Total Protein 6.6 6.4 - 8.4 g/dL    Albumin 3.8 3.5 - 5.0 g/dL    Total Bilirubin 0.77 0.20 - 1.00 mg/dL    eGFR 45 ml/min/1.73sq m   Magnesium    Collection Time: 03/27/24  5:51 AM   Result Value Ref Range    Magnesium 1.7 (L) 1.9 - 2.7 mg/dL   Phosphorus    Collection Time: 03/27/24  5:51 AM   Result Value Ref Range    Phosphorus 2.6 2.3 - 4.1 mg/dL   CBC and differential    Collection Time: 03/27/24  5:51 AM   Result Value Ref Range    WBC 9.30 4.31 - 10.16 Thousand/uL    RBC 4.38 3.81 - 5.12 Million/uL    Hemoglobin 13.1 11.5 - 15.4 g/dL    Hematocrit 38.8 34.8 - 46.1 %    MCV 89 82 - 98 fL    MCH 29.9 26.8 - 34.3 pg    MCHC 33.8 31.4 - 37.4 g/dL    RDW 14.4 11.6 - 15.1 %    MPV 11.1 8.9 - 12.7 fL    Platelets 214 149 - 390 Thousands/uL    nRBC 0 /100 WBCs    Segmented % 81 (H) 43 - 75 %    Immature Grans % 0 0 - 2 %    Lymphocytes % 9 (L) 14 - 44 %    Monocytes % 9 4 - 12 %    Eosinophils Relative 1 0 - 6 %    Basophils Relative 0 0 - 1 %    Absolute Neutrophils 7.44 1.85 - 7.62 Thousands/µL    Absolute Immature Grans 0.04 0.00 - 0.20 Thousand/uL    Absolute Lymphocytes 0.86 0.60 - 4.47 Thousands/µL    Absolute Monocytes 0.84 0.17 - 1.22 Thousand/µL    Eosinophils Absolute 0.09 0.00 - 0.61 Thousand/µL    Basophils Absolute 0.03 0.00 - 0.10 Thousands/µL   TSH, 3rd generation with Free T4 reflex    Collection Time: 03/27/24  5:51 AM   Result Value Ref Range    TSH 3RD GENERATON 2.103 0.450 - 4.500 uIU/mL   Vitamin D 25 hydroxy    Collection Time: 03/27/24  5:51 AM   Result Value Ref Range    Vit D, 25-Hydroxy 31.7 30.0 - 100.0 ng/mL   Lipid panel    Collection Time: 03/27/24  5:51 AM   Result Value Ref Range    Cholesterol 105 See Comment mg/dL    Triglycerides 78 See Comment mg/dL    HDL, Direct 54 >=50 mg/dL    LDL Calculated 35 0 - 100 mg/dL    Non-HDL-Chol (CHOL-HDL) 51 mg/dl   Hemoglobin A1C    Collection Time: 03/27/24  5:51  AM   Result Value Ref Range    Hemoglobin A1C 6.0 (H) Normal 4.0-5.6%; PreDiabetic 5.7-6.4%; Diabetic >=6.5%; Glycemic control for adults with diabetes <7.0% %     mg/dl   ECG 12 lead    Collection Time: 03/27/24  8:53 AM   Result Value Ref Range    Ventricular Rate 0 BPM    Atrial Rate 0 BPM    FL Interval  ms    QRSD Interval 0 ms    QT Interval 0 ms    QTC Interval 0 ms    P Axis  degrees    QRS Axis 0 degrees    T Wave Axis 0 degrees   ECG 12 lead    Collection Time: 03/27/24  9:04 AM   Result Value Ref Range    Ventricular Rate 86 BPM    Atrial Rate 101 BPM    FL Interval  ms    QRSD Interval 122 ms    QT Interval 410 ms    QTC Interval 490 ms    P Axis  degrees    QRS Axis 90 degrees    T Wave Axis -21 degrees       Assessment/Plan:    No problem-specific Assessment & Plan notes found for this encounter.           Problem List Items Addressed This Visit          Behavioral Health    Depression     Patient following up for anxiety and depression.  Was started on Seroquel 25 mg for management of the symptoms and also to help with chronic insomnia.  Patient reports mild improvement in her symptoms.  Sleep has slightly improved now.  Her mood is slightly stable.  Patient denies any suicidal thoughts or ideation or plan.  I have recommended her to continue the current dose.  Also recommended to try an adult  program so she is physically and mentally involved in the program.  Discussed this with her daughter-in-law who is on board, and willing to try anything that would help improve her mental health.         Relevant Medications    QUEtiapine (SEROquel) 25 mg tablet    Suicide attempt (HCC)    Relevant Medications    QUEtiapine (SEROquel) 25 mg tablet

## 2024-04-18 ENCOUNTER — PATIENT OUTREACH (OUTPATIENT)
Dept: FAMILY MEDICINE CLINIC | Facility: CLINIC | Age: 84
End: 2024-04-18

## 2024-04-18 NOTE — PROGRESS NOTES
Telephone call to patients daughter in law, left message with my name and contact information. List of adult day care providers sent via letter .

## 2024-04-19 ENCOUNTER — TELEPHONE (OUTPATIENT)
Age: 84
End: 2024-04-19

## 2024-04-19 NOTE — TELEPHONE ENCOUNTER
Caller: Kateryna (LUDWIG)    Doctor: VIVIENNE    Reason for call: Pts is experiencing a lot of pain in her arm and elbow area and would like to see Dr Mraie it is a new issue as she hasn't been see for it in the past and would like to know if its possible to get in sooner to see the Dr as she can hardly sleep due to the pain.     Please advise    Call back#: [phone]

## 2024-04-19 NOTE — TELEPHONE ENCOUNTER
Pt is currently scheduled for soonest available appt with FQ on 5/22, [place]'s soonest 30 min appt is 5/20. Please advise if any further recommendations for new pain in the meantime. Thank you

## 2024-04-19 NOTE — TELEPHONE ENCOUNTER
I saw her last year for left-sided neck and arm pain for which she had a CHAUNCEY.  Are symptoms similar?  If so we can get her scheduled for repeat injection

## 2024-04-19 NOTE — TELEPHONE ENCOUNTER
LMOM for pt to cb. CB# and OH provided.    Unable to reach pt at Home #, voicemail left on alternate # in chart

## 2024-04-23 ENCOUNTER — TELEPHONE (OUTPATIENT)
Dept: PAIN MEDICINE | Facility: CLINIC | Age: 84
End: 2024-04-23

## 2024-04-23 ENCOUNTER — TELEPHONE (OUTPATIENT)
Dept: PSYCHIATRY | Facility: CLINIC | Age: 84
End: 2024-04-23

## 2024-04-23 DIAGNOSIS — I48.0 PAROXYSMAL ATRIAL FIBRILLATION (HCC): ICD-10-CM

## 2024-04-23 DIAGNOSIS — I49.1 PAC (PREMATURE ATRIAL CONTRACTION): ICD-10-CM

## 2024-04-23 DIAGNOSIS — Z79.01 CURRENT USE OF LONG TERM ANTICOAGULATION: ICD-10-CM

## 2024-04-23 DIAGNOSIS — I34.0 MODERATE TO SEVERE MITRAL REGURGITATION: ICD-10-CM

## 2024-04-23 NOTE — TELEPHONE ENCOUNTER
"Behavioral Health Outpatient Intake Questions    Referred By   : ITZEL Wiseman    Please advise interviewee that they need to answer all questions truthfully to allow for best care, and any misrepresentations of information may affect their ability to be seen at this clinic   => Was this discussed? Yes     If Minor Child (under age 18)    Who is/are the legal guardian(s) of the child?     Is there a custody agreement?      If \"YES\"- Custody orders must be obtained prior to scheduling the first appointment  In addition, Consent to Treatment must be signed by all legal guardians prior to scheduling the first appointment    If \"NO\"- Consent to Treatment must be signed by all legal guardians prior to scheduling the first appointment    Behavioral Health Outpatient Intake History -     Presenting Problem (in patient's own words): Pt was inpatient after intentional overdose on 3/22/2024. Pt has depression since she lost her  a few years ago.    Are there any communication barriers for this patient?     No                                               If yes, please describe barriers:   If there is a unique situation, please refer to Fareed Manzanares/Nilda Roque for final determination.    Are you taking any psychiatric medications? Yes     If \"YES\" -What are they Seroquel     If \"YES\" -Who prescribes? Jimmylela Lambertpal    Has the Patient previously received outpatient Talk Therapy or Medication Management from [facility]  No        If \"YES\"- When, Where and with Whom?         If \"NO\" -Has Patient received these services elsewhere?       If \"YES\" -When, Where, and with Whom?      Has the Patient abused alcohol or other substances in the last 6 months ? No  No concerns of substance abuse are reported.     If \"YES\" -What substance, How much, How often?     If illegal substance: Refer to [facility] (for SHIR[facility]) or [facility] Offices.   If Alcohol in excess of 10 drinks per week:  Refer to [facility] (for [facility]) or [facility] " "Offices    Legal History-     Is this treatment court ordered? No   If \"yes \"send to :  Talk Therapy : Send to Fareed Manzanares/Nilda Roque for final determination   Med Management: Send to Dr Charles for final determination     Has the Patient been convicted of a felony?  No   If \"Yes\" send to -When, What?  Talk Therapy : Send to Fareed Roque for final determination   Med Management: Send to Dr Charles for final determination     ACCEPTED as a patient Yes  If \"Yes\" Appointment Date: with Fermín Whittaker  Monday, June 17, 2024 @ 2:00pm  Monday, July 15, 2024 @ 2:00pm    Referred Elsewhere? No  If “Yes” - (Where? Ex: [facility], [facility], [facility], [facility], etc.)       Name of Insurance Co: Medicare  Insurance ID# [id number]  Insurance Phone #   If ins is primary or secondary? primary  If patient is a minor, parents information such as Name, D.O.B of guarantor.    RTE for appts can be ran as of 5/31/2024.    New patient paperwork packet sent via My Chart on 4/23/2024.  "

## 2024-04-23 NOTE — TELEPHONE ENCOUNTER
This patient is being considered for a neuraxial injection for the management of their pain. However, the patient is currently on an anticoagulant per your orders. The American Society of Regional Anest[employer] Guideline on neuraxial procedures and anti-coagulation indicates that medication should be held as follows: Eliquis 3 days prior to injection.   Please advise if you ok the hold of this medication.    Thank you,    Jun Arreaga  Procedure   [facility]

## 2024-04-23 NOTE — TELEPHONE ENCOUNTER
S/w Pt, Pt C/o 9/10 pain in LUE, Pain radiates from elbow to shoulder to hand, worse @ HS. Pt reports pain is 20/10 @ HS, pain is described as aching and constant, has been worse in the last 3-4 weeks. Last injection 06/13/23 CHAUNCEY. At that time Pt reported 100% relief. Please advise.

## 2024-04-29 ENCOUNTER — HOSPITAL ENCOUNTER (OUTPATIENT)
Dept: NON INVASIVE DIAGNOSTICS | Facility: HOSPITAL | Age: 84
Discharge: HOME | End: 2024-04-29
Attending: INTERNAL MEDICINE
Payer: MEDICARE

## 2024-04-29 VITALS
HEART RATE: 84 BPM | HEIGHT: 60 IN | SYSTOLIC BLOOD PRESSURE: 125 MMHG | DIASTOLIC BLOOD PRESSURE: 72 MMHG | BODY MASS INDEX: 28.47 KG/M2 | WEIGHT: 145 LBS

## 2024-04-29 DIAGNOSIS — I34.0 MODERATE TO SEVERE MITRAL REGURGITATION: ICD-10-CM

## 2024-04-29 DIAGNOSIS — I50.32 CHRONIC DIASTOLIC (CONGESTIVE) HEART FAILURE (HCC): ICD-10-CM

## 2024-04-29 LAB
AORTIC ROOT: 2.9 CM
AORTIC VALVE MEAN VELOCITY: 11.7 M/S
APICAL FOUR CHAMBER EJECTION FRACTION: 66 %
AV AREA BY CONTINUOUS VTI: 1.4 CM2
AV AREA PEAK VELOCITY: 1.3 CM2
AV LVOT MEAN GRADIENT: 2 MMHG
AV LVOT PEAK GRADIENT: 3 MMHG
AV MEAN GRADIENT: 6 MMHG
AV PEAK GRADIENT: 11 MMHG
AV REGURGITATION PRESSURE HALF TIME: 336 MS
AV VALVE AREA: 1.42 CM2
AV VELOCITY RATIO: 0.49
BSA FOR ECHO PROCEDURE: 1.63 M2
DOP CALC AO PEAK VEL: 1.62 M/S
DOP CALC AO VTI: 35.94 CM
DOP CALC LVOT AREA: 2.54 CM2
DOP CALC LVOT CARDIAC INDEX: 2.73 L/MIN/M2
DOP CALC LVOT CARDIAC OUTPUT: 4.44 L/MIN
DOP CALC LVOT DIAMETER: 1.8 CM
DOP CALC LVOT PEAK VEL VTI: 20.1 CM
DOP CALC LVOT PEAK VEL: 0.8 M/S
DOP CALC LVOT STROKE INDEX: 31.9 ML/M2
DOP CALC LVOT STROKE VOLUME: 51.12
E WAVE DECELERATION TIME: 194 MS
FRACTIONAL SHORTENING: 33 (ref 28–44)
INTERVENTRICULAR SEPTUM IN DIASTOLE (PARASTERNAL SHORT AXIS VIEW): 1 CM
INTERVENTRICULAR SEPTUM: 1 CM (ref 0.6–1.1)
LAAS-AP2: 39.5 CM2
LAAS-AP4: 39.7 CM2
LEFT ATRIUM SIZE: 4.7 CM
LEFT ATRIUM VOLUME (MOD BIPLANE): 163 ML
LEFT ATRIUM VOLUME INDEX (MOD BIPLANE): 100 ML/M2
LEFT INTERNAL DIMENSION IN SYSTOLE: 2.9 CM (ref 2.1–4)
LEFT VENTRICULAR INTERNAL DIMENSION IN DIASTOLE: 4.3 CM (ref 3.5–6)
LEFT VENTRICULAR POSTERIOR WALL IN END DIASTOLE: 0.9 CM
LEFT VENTRICULAR STROKE VOLUME: 50 ML
LVSV (TEICH): 50 ML
MITRAL REGURGITATION PEAK VELOCITY: 5.26 M/S
MITRAL VALVE MEAN INFLOW VELOCITY: 4.13 M/S
MITRAL VALVE REGURGITANT PEAK GRADIENT: 111 MMHG
MV E'TISSUE VEL-SEP: 6 CM/S
MV PEAK E VEL: 176 CM/S
MV STENOSIS PRESSURE HALF TIME: 56 MS
MV VALVE AREA P 1/2 METHOD: 3.93
RA PRESSURE ESTIMATED: 8 MMHG
RIGHT ATRIUM AREA SYSTOLE A4C: 27.2 CM2
RIGHT VENTRICLE ID DIMENSION: 3.6 CM
RV PSP: 95 MMHG
SL CV AV DECELERATION TIME RETROGRADE: 1157 MS
SL CV AV PEAK GRADIENT RETROGRADE: 45 MMHG
SL CV DOP CALC MV VTI RETROGRADE: 170.4 CM
SL CV LEFT ATRIUM LENGTH A2C: 7.5 CM
SL CV LV EF: 55
SL CV MV MEAN GRADIENT RETROGRADE: 74 MMHG
SL CV PED ECHO LEFT VENTRICLE DIASTOLIC VOLUME (MOD BIPLANE) 2D: 83 ML
SL CV PED ECHO LEFT VENTRICLE SYSTOLIC VOLUME (MOD BIPLANE) 2D: 33 ML
TR MAX PG: 87 MMHG
TR PEAK VELOCITY: 4.7 M/S
TRICUSPID ANNULAR PLANE SYSTOLIC EXCURSION: 1.5 CM
TRICUSPID VALVE PEAK REGURGITATION VELOCITY: 4.66 M/S

## 2024-04-29 PROCEDURE — 93306 TTE W/DOPPLER COMPLETE: CPT

## 2024-04-29 PROCEDURE — 93306 TTE W/DOPPLER COMPLETE: CPT | Performed by: INTERNAL MEDICINE

## 2024-04-30 ENCOUNTER — HOSPITAL ENCOUNTER (OUTPATIENT)
Dept: RADIOLOGY | Facility: CLINIC | Age: 84
Discharge: HOME | End: 2024-04-30
Payer: MEDICARE

## 2024-04-30 VITALS
HEART RATE: 87 BPM | OXYGEN SATURATION: 100 % | SYSTOLIC BLOOD PRESSURE: 105 MMHG | RESPIRATION RATE: 20 BRPM | TEMPERATURE: 97.9 F | DIASTOLIC BLOOD PRESSURE: 66 MMHG

## 2024-04-30 DIAGNOSIS — M54.12 CERVICAL RADICULOPATHY: ICD-10-CM

## 2024-04-30 PROCEDURE — 62321 NJX INTERLAMINAR CRV/THRC: CPT | Performed by: ANESTHESIOLOGY

## 2024-04-30 RX ORDER — METHYLPREDNISOLONE ACETATE 80 MG/ML
80 INJECTION, SUSPENSION INTRA-ARTICULAR; INTRALESIONAL; INTRAMUSCULAR; PARENTERAL; SOFT TISSUE ONCE
Status: COMPLETED | OUTPATIENT
Start: 2024-04-30 | End: 2024-04-30

## 2024-04-30 RX ADMIN — IOHEXOL 1 ML: 300 INJECTION, SOLUTION INTRAVENOUS at 13:38

## 2024-04-30 RX ADMIN — METHYLPREDNISOLONE ACETATE 80 MG: 80 INJECTION, SUSPENSION INTRA-ARTICULAR; INTRALESIONAL; INTRAMUSCULAR; PARENTERAL; SOFT TISSUE at 13:38

## 2024-04-30 NOTE — DISCHARGE INSTR - LAB
Epidural Steroid Injection   WHAT YOU NEED TO KNOW:   An epidural steroid injection (BRET) is a procedure to inject steroid medicine into the epidural space. The epidural space is between your spinal cord and vertebrae. Steroids reduce inflammation and fluid buildup in your spine that may be causing pain. You may be given pain medicine along with the steroids.          ACTIVITY  Do not drive or operate machinery today.  No strenuous activity today - bending, lifting, etc.  You may resume normal activites starting tomorrow - start slowly and as tolerated.  You may shower today, but no tub baths or hot tubs.  You may have numbness for several hours from the local anesthetic. Please use caution and common sense, especially with weight-bearing activities.    CARE OF THE INJECTION SITE  If you have soreness or pain, apply ice to the area today (20 minutes on/20 minutes off).  Starting tomorrow, you may use warm, moist heat or ice if needed.  You may have an increase or change in your discomfort for 36-48 hours after your treatment.  Apply ice and continue with any pain medication you have been prescribed.  Notify the [facility] if you have any of the following: redness, drainage, swelling, headache, stiff neck or fever above 100°F.    SPECIAL INSTRUCTIONS  Our office will contact you in approximately 7 days for a progress report.    MEDICATIONS  Continue to take all routine medications.  Our office may have instructed you to hold some medications.    As no general anesthesia was used in today's procedure, you should not experience any side effects related to anesthesia.     If you are diabetic, the steroids used in today's injection may temporarily increase your blood sugar levels after the first few days after your injection. Please keep a close eye on your sugars and alert the doctor who manages your diabetes if your sugars are significantly high from your baseline or you are symptomatic.     If you have a  problem specifically related to your procedure, please call our office at [phone].  Problems not related to your procedure should be directed to your primary care physician.

## 2024-04-30 NOTE — H&P
History of Present Illness: The patient is a 83 y.o. female who presents with complaints of neck and arm pain and is here today for a cervical epidural steroid injection    Past Medical History:   Diagnosis Date    Anxiety     Arthritis     joints    Bunion     Cataract     Disease of thyroid gland     Eczema     GERD (gastroesophageal reflux disease)     Gout     Heart failure (HCC)     Hepatitis     Hiatal hernia     Hypertension     Onychomycosis     last assessed: 16    Orthopnea     resolved: 16    Pseudogout of left wrist     Sleep apnea     wears CPAP    Stroke (HCC)        Past Surgical History:   Procedure Laterality Date    ABDOMINAL SURGERY          BRAIN HEMATOMA EVACUATION Right 2020    Procedure: Right decompressive craniectomy and evacuation of intraparenchymal hematoma;  Surgeon: Apolinar Herrera MD;  Location: [facility];  Service: Neurosurgery    BREAST SURGERY Right     cyst removal    CARPAL TUNNEL RELEASE Left     CARPAL TUNNEL RELEASE Right     CATARACT EXTRACTION       SECTION  1960    x1    COLONOSCOPY N/A 2018    Procedure: COLONOSCOPY;  Surgeon: Alejandro Carlson MD;  Location: [facility] GI LAB;  Service: Gastroenterology    DILATION AND CURETTAGE OF UTERUS  1967    EYE SURGERY Left 2006    cataracts    HERNIA REPAIR      umbilical hernia    INCISIONAL HERNIA REPAIR      incarcerated    KNEE ARTHROSCOPY Right     FL RPLCMT BONE FLAP/PROSTHETIC PLATE SKULL Right 2020    Procedure: right frontotemporal replacement cranioplasty;  Surgeon: Apolinar Herrera MD;  Location: [facility];  Service: Neurosurgery    FL XCAPSL CTRC RMVL INSJ IO LENS PROSTH W/O ECP Right 3/27/2017    Procedure: EXTRACTION EXTRACAPSULAR CATARACT PHACO INTRAOCULAR LENS (IOL);  Surgeon: Trip Gilbert MD;  Location: [facility] MAIN OR;  Service: Ophthalmology         Current Outpatient Medications:     apixaban (Eliquis) 2.5 mg, Take 1 tablet (2.5 mg total) by mouth 2 (two) times a day,  Disp: 180 tablet, Rfl: 3    atorvastatin (LIPITOR) 40 mg tablet, Take 1 tablet (40 mg total) by mouth every evening, Disp: 90 tablet, Rfl: 1    bumetanide (BUMEX) 1 mg tablet, Take 1 tablet (1 mg total) by mouth daily, Disp: 90 tablet, Rfl: 3    Diclofenac Sodium (VOLTAREN) 1 %, Apply 2 g topically in the morning APPLY NO MORE THAN 3 DAYS IN A ROW THEN TAKE A BREAK FOR ONE WEEK FROM USE., Disp: 100 g, Rfl: 1    escitalopram (LEXAPRO) 5 mg tablet, Take 1 tablet (5 mg total) by mouth daily, Disp: 30 tablet, Rfl: 0    levothyroxine 88 mcg tablet, TAKE 1 TABLET EVERY DAY, Disp: 90 tablet, Rfl: 1    losartan (COZAAR) 25 mg tablet, TAKE 2 TABLETS IN THE MORNING AND TAKE 1 TABLET IN THE EVENING, Disp: 270 tablet, Rfl: 10    metoprolol tartrate (LOPRESSOR) 25 mg tablet, TAKE 1/2 TABLET EVERY 12 HOURS, Disp: 90 tablet, Rfl: 1    pantoprazole (PROTONIX) 20 mg tablet, TAKE 1 TABLET EVERY DAY, Disp: 90 tablet, Rfl: 1    QUEtiapine (SEROquel) 25 mg tablet, Take 1 tablet (25 mg total) by mouth daily at bedtime, Disp: 30 tablet, Rfl: 1    senna-docusate sodium (SENOKOT S) 8.6-50 mg per tablet, Take 1 tablet by mouth daily, Disp: 20 tablet, Rfl: 1    spironolactone (ALDACTONE) 25 mg tablet, Take 0.5 tablets (12.5 mg total) by mouth daily, Disp: 45 tablet, Rfl: 3    Current Facility-Administered Medications:     iohexol (OMNIPAQUE) 300 mg/mL injection 1 mL, 1 mL, Epidural, Once, Luis Marie MD    methylPREDNISolone acetate (DEPO-MEDROL) injection 80 mg, 80 mg, Epidural, Once, Luis Marie MD    No Known Allergies    Physical Exam:   Vitals:    04/30/24 1322   BP: 124/71   Pulse: 80   Resp: 20   Temp: 97.9 °F (36.6 °C)   SpO2: 99%     General: Awake, Alert, Oriented x 3, Mood and affect appropriate  Respiratory: Respirations even and unlabored  Cardiovascular: Peripheral pulses intact; no edema  Musculoskeletal Exam: Neck and arm pain    ASA Score: 3    Patient/Chart Verification  Patient ID Verified: Verbal  Consents  Confirmed: To be obtained in the Pre-Procedure area  Interval H&P(within 24 hr) Complete (required for Outpatients and Surgery Admit only): To be obtained in the Pre-Procedure area  Allergies Reviewed: Yes  Anticoag/NSAID held?: Yes (last dose of Eliquis was 4/26/24)  Currently on antibiotics?: No    Assessment:   1. Cervical radiculopathy        Plan: CHAUNCEY

## 2024-05-02 ENCOUNTER — TELEPHONE (OUTPATIENT)
Age: 84
End: 2024-05-02

## 2024-05-02 NOTE — TELEPHONE ENCOUNTER
Patient's daughter in law Kateryna called in with patient in room to report patient stopped taking QUEtiapine (SEROquel) 25 mg tablet on Friday 4/26 when stopping Eliquis for cervical epidural steroid injection on 4/30. Patient reports the medication did not help her, did not help with sleep, felt groggy and sleepy through the day. Reports she is feeling better post the injection. Kateryna has concern since patient took medication 4/4-4/26. Did patient need to taper dosing? No medication for the past 6 days. Please follow up with Kateryna for provider response?     No

## 2024-05-07 ENCOUNTER — TELEPHONE (OUTPATIENT)
Dept: RADIOLOGY | Facility: MEDICAL CENTER | Age: 84
End: 2024-05-07

## 2024-05-09 ENCOUNTER — OFFICE VISIT (OUTPATIENT)
Age: 84
End: 2024-05-09
Payer: MEDICARE

## 2024-05-09 VITALS
HEIGHT: 60 IN | RESPIRATION RATE: 17 BRPM | SYSTOLIC BLOOD PRESSURE: 116 MMHG | WEIGHT: 145 LBS | BODY MASS INDEX: 28.47 KG/M2 | HEART RATE: 93 BPM | DIASTOLIC BLOOD PRESSURE: 78 MMHG

## 2024-05-09 DIAGNOSIS — M25.572 PAIN IN JOINTS OF BOTH FEET: ICD-10-CM

## 2024-05-09 DIAGNOSIS — I70.209 PERIPHERAL ARTERIOSCLEROSIS (HCC): ICD-10-CM

## 2024-05-09 DIAGNOSIS — M25.571 PAIN IN JOINTS OF BOTH FEET: ICD-10-CM

## 2024-05-09 DIAGNOSIS — B35.1 ONYCHOMYCOSIS: ICD-10-CM

## 2024-05-09 DIAGNOSIS — M54.16 RADICULOPATHY OF LUMBAR REGION: ICD-10-CM

## 2024-05-09 DIAGNOSIS — B35.9 DERMATOPHYTOSIS: ICD-10-CM

## 2024-05-09 DIAGNOSIS — M77.41 METATARSALGIA OF BOTH FEET: Primary | ICD-10-CM

## 2024-05-09 DIAGNOSIS — M77.42 METATARSALGIA OF BOTH FEET: Primary | ICD-10-CM

## 2024-05-09 PROCEDURE — 99213 OFFICE O/P EST LOW 20 MIN: CPT | Performed by: PODIATRIST

## 2024-05-09 NOTE — PROGRESS NOTES
Assessment/Plan: Metatarsalgia secondary to radiculopathy.  Pain upon ambulation.  Mycosis of nail.  Dermatophytosis.  Patient with peripheral artery disease.     Plan.  Chart reviewed.  PCP notes reviewed.  Lab work reviewed . patient examined.  Patient advised on condition.  At this time patient remain on Cymbalta as directed.  In addition she will use over-the-counter antifungal.  For xerosis, patient will use moisturizer after bathing.  Shoe size evaluated.  Shoe style recommended.  Aftercare instruction given.  Return for follow-up.            Diagnoses and all orders for this visit:     Metatarsalgia of both feet     Radiculopathy of lumbar region     Dermatophytosis     Onychomycosis     Peripheral arteriosclerosis (HCC)            Subjective: Patient has pain in her feet.  She has low back pain.  She gets some relief from Cymbalta.  She also complains of ingrown toenails.  No history of trauma     No Known Allergies        Current Outpatient Medications:     amLODIPine (NORVASC) 10 mg tablet, Take 0.5 tablets (5 mg total) by mouth daily, Disp: 90 tablet, Rfl: 0    apixaban (Eliquis) 2.5 mg, Take 1 tablet (2.5 mg total) by mouth 2 (two) times a day, Disp: 180 tablet, Rfl: 3    atorvastatin (LIPITOR) 40 mg tablet, Take 1 tablet (40 mg total) by mouth every evening, Disp: 90 tablet, Rfl: 1    bumetanide (BUMEX) 2 mg tablet, Take 0.5 tablets (1 mg total) by mouth daily, Disp: , Rfl:     Diclofenac Sodium (VOLTAREN) 1 %, Apply 2 g topically in the morning APPLY NO MORE THAN 3 DAYS IN A ROW THEN TAKE A BREAK FOR ONE WEEK FROM USE., Disp: 100 g, Rfl: 1    levothyroxine 88 mcg tablet, TAKE 1 TABLET EVERY DAY, Disp: 90 tablet, Rfl: 1    losartan (COZAAR) 25 mg tablet, TAKE 2 TABLETS IN THE MORNING AND TAKE 1 TABLET IN THE EVENING, Disp: 270 tablet, Rfl: 10    metoprolol tartrate (LOPRESSOR) 25 mg tablet, Take 0.5 tablets (12.5 mg total) by mouth every 12 (twelve) hours, Disp: 45 tablet, Rfl: 3    pantoprazole  (PROTONIX) 20 mg tablet, TAKE 1 TABLET EVERY DAY, Disp: 90 tablet, Rfl: 1    spironolactone (ALDACTONE) 25 mg tablet, Take 0.5 tablets (12.5 mg total) by mouth daily, Disp: 45 tablet, Rfl: 3    gabapentin (NEURONTIN) 100 mg capsule, Take 1 tablet at bedtime.  May increase to 2 tablets after 3 days (Patient not taking: Reported on 12/15/2023), Disp: 90 capsule, Rfl: 1    ketoconazole (NIZORAL) 2 % cream, Apply topically daily, Disp: 60 g, Rfl: 1    senna-docusate sodium (SENOKOT S) 8.6-50 mg per tablet, Take 1 tablet by mouth daily (Patient not taking: Reported on 12/18/2023), Disp: 20 tablet, Rfl: 1           Patient Active Problem List   Diagnosis    Benign essential hypertension    Chronic diastolic (congestive) heart failure (HCC)    Hypothyroidism    Arthropathy of knee    Hallux abductovalgus with bunions, unspecified laterality    CKD (chronic kidney disease), stage III (HCC)    Diverticulitis of colon    Chronic gout without tophus    Hiatal hernia    Hyperlipemia    Hyperuricemia    Nephrolithiasis    WENDI (obstructive sleep apnea)    Pseudogout of left wrist    Gastroesophageal reflux disease without esophagitis    Exertional shortness of breath    Prediabetes    Status post right frontotemporal replacement cranioplasty    BMI 38.0-38.9,adult    Sciatica of left side    Spinal stenosis of lumbar region with neurogenic claudication    Cervical radiculopathy    Paroxysmal atrial fibrillation (HCC)    PAC (premature atrial contraction)             Patient ID: Rosalind Wise is a 83 y.o. female.     HPI     The following portions of the patient's history were reviewed and updated as appropriate:      family history includes Arthritis in her family and mother; Coronary artery disease in her mother; Diabetes in her brother, mother, and son; Hypertension in her father and mother.       reports that she has never smoked. She has never used smokeless tobacco. She reports current alcohol use. She reports that she  does not use drugs.      Objective:  Patient's shoes and socks removed.   Foot ExamPhysical Exam       Physical Exam   Left Foot: Appearance: Normal except as noted: excessive pronation-- and-- pes planus.    Right Foot: Appearance: Normal except as noted: excessive pronation-- and-- pes planus. Tenderness: None except the calcaneous,-- medial calcaneous-- and-- insertion of the plantar fascia.  Patient has an enlarged hallux valgus deformity with inflamed bunion.  Left Ankle: Appearance: Normal except ecchymosis-- and-- swelling laterally. ROM: limited ROM in all planes    Right Ankle: ROM: limited ROM in all planes Motor: diffuse weakness.  Pain with palpation anterior lateral aspect right ankle joint mortise.    Neurological Exam: performed. Light touch was intact bilaterally. Vibratory sensation was intact bilaterally. Response to monofilament test was intact bilaterally. Deep tendon reflexes: patellar reflex present bilaterally-- and-- achilles reflex present bilaterally.    Vascular Exam: performed Dorsalis pedis pulses were 1/4 bilaterally. Posterior tibial pulses were 1/4 bilaterally. Elevation Pallor: diminished bilaterally. Capillary refill time was greater than 3 seconds bilaterally-- and-- Q9 findings bilateral. Negative digital hair noted. Positive abnormal cooling bilateral. Edema: moderate bilaterally-- and-- 6/7 pitting edema. Negative Homans sign.    Toenails: All of the toenails were elongated,-- hypertrophied,-- discolored-- and-- Mycotic with onychogryphosis. Note is made of bilateral tinea pedis in moccasin foot. Distribution.         Socks and shoes removed, Right Foot Findings: swollen, erythematous and dry.      The sensory exam showed diminished vibratory sensation at the level of the toes. Diminished tactile sensation with monofilament testing throughout the right foot.      Socks and shoes removed, Left Foot Findings: swollen, erythematous and dry.      The sensory exam showed diminished  vibratory sensation at the level of the toes. Diminished tactile sensation with monofilament testing throughout the left foot.     Capillary refills findings on the right were delayed in the toes.      Pulses:      1+ in the posterior tibialis on the right      1+ in the dorsalis pedis on the right.      Capillary refills findings on the left were delayed in the toes.      Pulses:      1+ in the posterior tibialis on the left      1+ in the dorsalis pedis on the left.      Assign Risk Category: 2: Loss of protective sensation with or without weakness, deformity, callus, pre-ulcer, or history of ulceration. High risk.    Hyperkeratosis: present on both first toes,-- present on both first sub metatarsals-- and-- Bilateral plantar moccasin tinea pedis noted.    Shoe Gear Evaluation: performed (). Recommendation(s): SAS style.

## 2024-05-13 ENCOUNTER — RA CDI HCC (OUTPATIENT)
Dept: OTHER | Facility: HOSPITAL | Age: 84
End: 2024-05-13

## 2024-05-13 DIAGNOSIS — I13.0 HYPERTENSIVE HEART AND CHRONIC KIDNEY DISEASE WITH HEART FAILURE AND STAGE 1 THROUGH STAGE 4 CHRONIC KIDNEY DISEASE, OR CHRONIC KIDNEY DISEASE (HCC): Primary | ICD-10-CM

## 2024-05-13 NOTE — PROGRESS NOTES
HCC coding opportunities          Chart Reviewed number of suggestions sent to Provider: 1  I13.0    Patients Insurance     Medicare Insurance: Medicare

## 2024-05-14 DIAGNOSIS — E78.5 HYPERLIPIDEMIA, UNSPECIFIED HYPERLIPIDEMIA TYPE: ICD-10-CM

## 2024-05-15 RX ORDER — ATORVASTATIN CALCIUM 40 MG/1
40 TABLET, FILM COATED ORAL EVERY EVENING
Qty: 90 TABLET | Refills: 1 | Status: SHIPPED | OUTPATIENT
Start: 2024-05-15

## 2024-05-20 ENCOUNTER — OFFICE VISIT (OUTPATIENT)
Dept: FAMILY MEDICINE CLINIC | Facility: CLINIC | Age: 84
End: 2024-05-20
Payer: MEDICARE

## 2024-05-20 VITALS
OXYGEN SATURATION: 99 % | HEIGHT: 60 IN | SYSTOLIC BLOOD PRESSURE: 122 MMHG | HEART RATE: 80 BPM | WEIGHT: 152 LBS | DIASTOLIC BLOOD PRESSURE: 78 MMHG | BODY MASS INDEX: 29.84 KG/M2

## 2024-05-20 DIAGNOSIS — I10 BENIGN ESSENTIAL HYPERTENSION: ICD-10-CM

## 2024-05-20 DIAGNOSIS — G47.09 OTHER INSOMNIA: Primary | ICD-10-CM

## 2024-05-20 DIAGNOSIS — F33.1 MODERATE EPISODE OF RECURRENT MAJOR DEPRESSIVE DISORDER (HCC): ICD-10-CM

## 2024-05-20 DIAGNOSIS — E78.5 HYPERLIPIDEMIA, UNSPECIFIED HYPERLIPIDEMIA TYPE: ICD-10-CM

## 2024-05-20 DIAGNOSIS — E03.9 HYPOTHYROIDISM, UNSPECIFIED TYPE: ICD-10-CM

## 2024-05-20 DIAGNOSIS — I50.32 CHRONIC DIASTOLIC (CONGESTIVE) HEART FAILURE (HCC): ICD-10-CM

## 2024-05-20 DIAGNOSIS — R73.03 PREDIABETES: ICD-10-CM

## 2024-05-20 PROCEDURE — G2211 COMPLEX E/M VISIT ADD ON: HCPCS | Performed by: FAMILY MEDICINE

## 2024-05-20 PROCEDURE — 99215 OFFICE O/P EST HI 40 MIN: CPT | Performed by: FAMILY MEDICINE

## 2024-05-20 RX ORDER — HYDROXYZINE HYDROCHLORIDE 25 MG/1
25 TABLET, FILM COATED ORAL
Qty: 30 TABLET | Refills: 1 | Status: SHIPPED | OUTPATIENT
Start: 2024-05-20

## 2024-05-20 NOTE — PROGRESS NOTES
Subjective:      Patient ID: Rosalind Wise is a 83 y.o. female.    HPI  Patient is following up from hospital discharge.  Admitted for suicide attempt with intentional overdose on medications.  Patient states that she has been feeling sad and depressed since her   a few years ago.  She moved in with her son's family, but states that she is experiencing social isolation.   Patient reports anger and frustration over multiple issues including the death of her , and the changes that occurred in her life there after.  Patient experiences difficulty falling asleep because of her anxiety.  Was started on Seroquel to see if that helps with her symptoms.    Reports that Seroquel made her sleepy during the day instead of night.  She prefers not to take the medication.  Reports that symptoms of anxiety and depression are improving gradually.  She is connected with her family and friends and tries to keep herself busy with her daily chores.  Patient is requesting not to be started on any medication for mood disorder management.    Past Medical History:   Diagnosis Date    Anxiety     Arthritis     joints    Bunion     Cataract     Disease of thyroid gland     Eczema     GERD (gastroesophageal reflux disease)     Gout     Heart failure (HCC)     Hepatitis     Hiatal hernia     Hypertension     Onychomycosis     last assessed: 16    Orthopnea     resolved: 16    Pseudogout of left wrist     Sleep apnea     wears CPAP    Stroke (HCC)        Family History   Problem Relation Age of Onset    Diabetes Mother     Coronary artery disease Mother     Arthritis Mother     Hypertension Mother     Hypertension Father     Diabetes Brother     Diabetes Son     Arthritis Family        Past Surgical History:   Procedure Laterality Date    ABDOMINAL SURGERY          BRAIN HEMATOMA EVACUATION Right 2020    Procedure: Right decompressive craniectomy and evacuation of intraparenchymal hematoma;  Surgeon:  Apolinar Herrera MD;  Location: [facility];  Service: Neurosurgery    BREAST SURGERY Right     cyst removal    CARPAL TUNNEL RELEASE Left     CARPAL TUNNEL RELEASE Right     CATARACT EXTRACTION       SECTION  1960    x1    COLONOSCOPY N/A 2018    Procedure: COLONOSCOPY;  Surgeon: Alejandro Carlson MD;  Location: [facility] GI LAB;  Service: Gastroenterology    DILATION AND CURETTAGE OF UTERUS  1967    EYE SURGERY Left 2006    cataracts    HERNIA REPAIR      umbilical hernia    INCISIONAL HERNIA REPAIR      incarcerated    KNEE ARTHROSCOPY Right     IA RPLCMT BONE FLAP/PROSTHETIC PLATE SKULL Right 2020    Procedure: right frontotemporal replacement cranioplasty;  Surgeon: Apolinar Herrera MD;  Location: [facility];  Service: Neurosurgery    IA XCAPSL CTRC RMVL INSJ IO LENS PROSTH W/O ECP Right 3/27/2017    Procedure: EXTRACTION EXTRACAPSULAR CATARACT PHACO INTRAOCULAR LENS (IOL);  Surgeon: Trip Gilbert MD;  Location: [facility] MAIN OR;  Service: Ophthalmology        reports that she has never smoked. She has never used smokeless tobacco. She reports current alcohol use. She reports that she does not use drugs.      Current Outpatient Medications:     apixaban (Eliquis) 2.5 mg, Take 1 tablet (2.5 mg total) by mouth 2 (two) times a day, Disp: 180 tablet, Rfl: 3    atorvastatin (LIPITOR) 40 mg tablet, TAKE 1 TABLET EVERY EVENING, Disp: 90 tablet, Rfl: 1    bumetanide (BUMEX) 1 mg tablet, Take 1 tablet (1 mg total) by mouth daily, Disp: 90 tablet, Rfl: 3    Diclofenac Sodium (VOLTAREN) 1 %, Apply 2 g topically in the morning APPLY NO MORE THAN 3 DAYS IN A ROW THEN TAKE A BREAK FOR ONE WEEK FROM USE., Disp: 100 g, Rfl: 1    levothyroxine 88 mcg tablet, TAKE 1 TABLET EVERY DAY, Disp: 90 tablet, Rfl: 1    losartan (COZAAR) 25 mg tablet, TAKE 2 TABLETS IN THE MORNING AND TAKE 1 TABLET IN THE EVENING, Disp: 270 tablet, Rfl: 10    metoprolol tartrate (LOPRESSOR) 25 mg tablet, TAKE 1/2 TABLET EVERY 12 HOURS,  Disp: 90 tablet, Rfl: 1    pantoprazole (PROTONIX) 20 mg tablet, TAKE 1 TABLET EVERY DAY, Disp: 90 tablet, Rfl: 1    QUEtiapine (SEROquel) 25 mg tablet, Take 1 tablet (25 mg total) by mouth daily at bedtime, Disp: 30 tablet, Rfl: 1    senna-docusate sodium (SENOKOT S) 8.6-50 mg per tablet, Take 1 tablet by mouth daily, Disp: 20 tablet, Rfl: 1    spironolactone (ALDACTONE) 25 mg tablet, Take 0.5 tablets (12.5 mg total) by mouth daily, Disp: 45 tablet, Rfl: 3    escitalopram (LEXAPRO) 5 mg tablet, Take 1 tablet (5 mg total) by mouth daily, Disp: 30 tablet, Rfl: 0    The following portions of the patient's history were reviewed and updated as appropriate: allergies, current medications, past family history, past medical history, past social history, past surgical history and problem list.    Review of Systems   Constitutional: Negative.    Respiratory: Negative.     Cardiovascular: Negative.            Objective:    /78   Pulse 80   Ht 5' (1.524 m)   Wt 68.9 kg (152 lb)   SpO2 99%   BMI 29.69 kg/m²      Physical Exam  Constitutional:       Appearance: Normal appearance.   Cardiovascular:      Heart sounds: Normal heart sounds.   Pulmonary:      Breath sounds: Normal breath sounds.   Musculoskeletal:      Right lower leg: No edema.      Left lower leg: No edema.   Neurological:      Mental Status: She is oriented to person, place, and time.   Psychiatric:         Mood and Affect: Mood normal.               Assessment/Plan:    No problem-specific Assessment & Plan notes found for this encounter.           Problem List Items Addressed This Visit          Cardiovascular and Mediastinum    Benign essential hypertension     Continue losartan 25 mg per nephrology         Chronic diastolic (congestive) heart failure (HCC)     Wt Readings from Last 3 Encounters:   05/20/24 68.9 kg (152 lb)   05/09/24 65.8 kg (145 lb)   04/29/24 65.8 kg (145 lb)     Patient asymptomatic.  Continue  bumetanide, spironolactone per  cardiology.                  Endocrine    Hypothyroidism (Chronic)     TSH stable.  Continue levothyroxine 88 mcg          Relevant Orders    TSH, 3rd generation with Free T4 reflex       Behavioral Health    Depression     Patient did not tolerate Seroquel for management of anxiety.  Due to symptoms of excess daytime sleepiness states that she is connected to her family and friends.  And that she does not want to be on any medications for management of anxiety or depression.  Patient tries to keep herself busy at home.  she is remorseful regarding her suicide attempt and states that this is something that she will never do again.           Relevant Medications    hydrOXYzine HCL (ATARAX) 25 mg tablet       Neurology/Sleep    Other insomnia - Primary     Chronic insomnia not responding to Seroquel.  Patient discontinued the medication.  Offered Atarax which patient will take only as needed.          Relevant Medications    hydrOXYzine HCL (ATARAX) 25 mg tablet       Other    Hyperlipemia     LDL is at goal.  Continue atorvastatin 40 mg.  Metabolic labs/follow-up 6 month interval         Relevant Orders    Lipid panel    Prediabetes     Reviewed with patient today.  A1c stable at 6.0  Encouraged to continue with low carb diet.  Continue monitoring.         Relevant Orders    Comprehensive metabolic panel    Hemoglobin A1C     I have spent a total time of 40 minutes on 05/20/24 in caring for this patient including Diagnostic results, Prognosis, Risks and benefits of tx options, Instructions for management, Patient and family education, Importance of tx compliance, Risk factor reductions, Impressions, Counseling / Coordination of care, Documenting in the medical record, Reviewing / ordering tests, medicine, procedures  , and Obtaining or reviewing history  .

## 2024-05-20 NOTE — ASSESSMENT & PLAN NOTE
Chronic insomnia not responding to Seroquel.  Patient discontinued the medication.  Offered Atarax which patient will take only as needed.

## 2024-05-20 NOTE — ASSESSMENT & PLAN NOTE
Patient did not tolerate Seroquel for management of anxiety.  Due to symptoms of excess daytime sleepiness states that she is connected to her family and friends.  And that she does not want to be on any medications for management of anxiety or depression.  Patient tries to keep herself busy at home.  she is remorseful regarding her suicide attempt and states that this is something that she will never do again.

## 2024-05-20 NOTE — ASSESSMENT & PLAN NOTE
Wt Readings from Last 3 Encounters:   05/20/24 68.9 kg (152 lb)   05/09/24 65.8 kg (145 lb)   04/29/24 65.8 kg (145 lb)     Patient asymptomatic.  Continue  bumetanide, spironolactone per cardiology.

## 2024-05-30 ENCOUNTER — TELEPHONE (OUTPATIENT)
Dept: CARDIOLOGY CLINIC | Facility: CLINIC | Age: 84
End: 2024-05-30

## 2024-05-30 NOTE — TELEPHONE ENCOUNTER
----- Message from Damon Yoon MD sent at 5/29/2024  3:27 PM EDT -----  Her echocardiogram shows normal heart function.  She has moderate to severe mitral regurgitation.  Compared to her prior echocardiogram there is no major change

## 2024-06-14 ENCOUNTER — OFFICE VISIT (OUTPATIENT)
Dept: NEPHROLOGY | Facility: CLINIC | Age: 84
End: 2024-06-14
Payer: MEDICARE

## 2024-06-14 VITALS
DIASTOLIC BLOOD PRESSURE: 84 MMHG | SYSTOLIC BLOOD PRESSURE: 132 MMHG | OXYGEN SATURATION: 100 % | HEIGHT: 60 IN | BODY MASS INDEX: 29.64 KG/M2 | HEART RATE: 78 BPM | WEIGHT: 151 LBS

## 2024-06-14 DIAGNOSIS — G47.09 OTHER INSOMNIA: ICD-10-CM

## 2024-06-14 DIAGNOSIS — N25.81 SECONDARY HYPERPARATHYROIDISM (HCC): ICD-10-CM

## 2024-06-14 DIAGNOSIS — I10 BENIGN ESSENTIAL HTN: ICD-10-CM

## 2024-06-14 DIAGNOSIS — E83.42 HYPOMAGNESEMIA: ICD-10-CM

## 2024-06-14 DIAGNOSIS — N18.30 STAGE 3 CHRONIC KIDNEY DISEASE, UNSPECIFIED WHETHER STAGE 3A OR 3B CKD (HCC): Primary | ICD-10-CM

## 2024-06-14 PROCEDURE — 99214 OFFICE O/P EST MOD 30 MIN: CPT | Performed by: INTERNAL MEDICINE

## 2024-06-14 RX ORDER — HYDROXYZINE HYDROCHLORIDE 10 MG/1
10 TABLET, FILM COATED ORAL
Qty: 30 TABLET | Refills: 0 | Status: SHIPPED | OUTPATIENT
Start: 2024-06-14

## 2024-06-14 NOTE — PROGRESS NOTES
NEPHROLOGY OFFICE VISIT   Rosalind Wise 83 y.o. female MRN: [id number]  6/14/2024    Reason for Visit: Renal transplant follow-up    ASSESSMENT and PLAN:    I had the pleasure of seeing Ms Wise today in the renal clinic for the continued management of renal transplant f/u.     2/2020 - February with 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 on February 11th.  Completed course of Keppra for seizure prophylaxis.     3/30/2020 - losartan was increased to 50 mg twice a day from 50 in the morning and 25 in the evening due to high blood pressures     4/3/2020 - spironolactone 12.5 mg daily was started due to continued high blood pressure     4/2020 - patient had presented for replacement current of cranioplasty on April 6.  And subsequently was admitted to the rehab center postoperatively      March 2021- patient was admitted to hospital with not feeling well and   Headache.  CT scan of the head with no acute intracranial pathology. The other symptoms of memory loss were also present.  Patient was referred to geriatrician.     April 2021 - sciatic pain. Received brief steroid course     5/2021 - back pain. Went to ER. Given pred taper     5/2023 - neck pain.CT of spine -  no cervical spine fracture or traumatic malalignmen      October 2023-patient was in the ER for dizziness and headache.  Patient was given normal saline.  Had a CAT scan completed.  CT of the head no acute intracranial abnormalities with unchanged large chronic encephalomalacia and gliosis in the right temporal lobe in the region of remote hemorrhage.  Chest x-ray was unrevealing.  Patient was discharged after volume expansion and feeling improved.     October 27, 2023-patient to follow-up with cardiology team.  For now mitral valve is being monitored. CT abdpel - no acute findings. Potential gastritis     March 26, 2024-patient had intentional overdose of prescribed medications and was admitted to the  hospital.  Had ingested Zofran, Atarax, Plavix, Benadryl, Tylenol cold and flu tablets.  Received N-acetylcysteine.  Had signs of anticholinergic effects.  With regards to patient's hypertension, metoprolol was increased to 25 mg twice daily especially given that the patient was in A-fib with RVR requiring Cardizem drip.  Patient was admitted after discharge from medicine team to the psychiatric team for further mental health care.  Patient was started on Lexapro.  Was discharged in stable and improving condition..     83-year-old female with a past medical history of chronic kidney disease, venous stasis, HTN, hypothyroidism, lumbar canal stenosis, Anxiety, GERD, WENDI, neuropathy, Gout, EF 50-55%, Grade 2 diastolic dysfunction who presents for follow up for CKD.      1) CKD III - patient also has a long standing history of HTN. Creatinine in 2013, 0.9 mg/dL. Cr early 2016 was 0.80-0.9 mg/dL; then increased starting in august 2016 to 2.3 mg/dL and has fluctuated since. CT scan in 2016, kidneys appearing normal at that time.      Etiology of CKD may be long standing HTN and ATN. Baseline Cr ~ 1.1 -1.3 mg/dL     - Urine eos 0%;   - A1c controlled prior  at 5.6% 12/2022  - UPCR too low to quant (6/2023)  - UA bland 6/2023  -SPEP unrevealing  -UPEP unrevealing  - C3, C4 unrevealing  - Renal u/s with R 10.3 cm, L 10.4 cm, renal cortex 1 cm b/l; both kidneys slightly reduced in size; lower pole of R kidney is non obstructing calculus  - 10/2023 - CT without hydro on kidney  - Pt follows with Urology team for nephrolithiasis.   - No NSAIDs, the patient is not currently taking NSAID  -nuc stress test per Cardiology in Jan 2019 unrevealing  - repeat renal u/s 1/2019 - unrevealing with exception of renal cortical atrophy; but also 6 mm non shadowing calculus.     Overall, patient is seen for follow-up June 2024.  Since last being seen, no new lab work.  Lab work from March 2024 showed stable renal function with magnesium okay  at 1.7.  Hemoglobin was at goal.  Urinalysis was relatively bland.  Also patient has completed echocardiogram in April with mitral regurgitation but otherwise stable per cardiology team.     Plan:     -No changes to antihypertensive regimen today  - Continue holding amlodipine for now  - Continue spironolactone 12.1/2 mg daily  - Continue losartan 50 mg in the morning and 25 mg in the evening  - Continue Bumex 1 mg once a day  - Lab work in 3 months  - appt in 4-5 months  - check mag level in 1 month as on mag supplement since hospitalization  - Patient states with hydroxyzine it works really well at 25 mg but may be working too well given that she is also sleepy during the day now.  I asked her to lowered to 10 mg tablets and I sent her a refill for 1 month and I will defer further prescription of this to primary care physician and I have informed the primary care physician also.     2) SOB - Volume - follows with Cardiology-resolved     - on Bumex.  lowered slightly to 1 mg once a day on December 15, 2023 as the patient may be becoming hypovolemic and patient is euvolemic.     3) HTN -      - cont losartan, bumex, spironolactone, metoprolol  -  due to bradycardia, beta-blocker was held prior but given new onset atrial fibrillation around May 2022, patient was restarted on metoprolol per Cardiology team  - December 2023-lower Bumex to 1 mg once daily due to concern for hypovolemia  - March 2024-it appears that the patient's amlodipine was held in the hospital.  And patient's metoprolol was increased.  Blood pressures are appropriate April 9, 2024 at appointment.  - 6/2024 -  syst. States was stressed coming to office.  Repeat blood pressures still 130 systolic in the office.  She states at home and her daughter confirms that blood pressures are mainly 1 16-1 20s systolic.  Therefore no changes for now but gave parameters for the patient to call us if they rise consistently above 135 systolic     4)  hypothyroid - as per Primary Care Physician     5) HPL - Primary Care following.      6) Electrolytes - stable     7) MBD -     - Vit D 50.7 in nov 2019 --> 40.8 March 2022 --> 31.7 March 2024-appropriate  - PTH improved 89 in nov 2019 --> 78.6 in June 2020 --> 88 3/8/2022 --> 136 December 2022 -->109 December 2023.  Appropriate for now.     8) gout -      -monitor for flare     9) back pain - follows with Pain management     - pain sig improved with inj     10) Colonoscopy in feb with internal hemorrhoids and polyp removed.      11) alk phos elevation     -improved     12) Anemia - Hb stable     -hemoglobin stable and at goal     13) Mitral valve calcification     - moderate to severe mitral regurgitation-stable on repeat echocardiogram April 2024 per cardiology team  -eventually may require further evaluation.  Being monitored for now conservatively.  - per Cardiology team     14) elevated D-dimer prior-patient was given duplex of lower extremities which was unrevealing prior     15) knee pain after fall in august 2020     - saw Orthopedic team  - steroid injection was given     16) intracranial hemorrhage with mass effect requiring craniotomy on 2/11/2020     - now is status post replacement of cranioplasty in April 2020     17) mental health-suicide attempt 2024, anxiety-  management per mental health teams and primary care physician      18)   Zoster infection in November 2020-treated with Valtrex per primary team     19) a fib - onset in May 2022     -was advised to have Holter monitor.  There is concern for tachy-hola syndrome initially and Holter monitor showed periods of atrial fibrillation with RVR and nonsustained ventricular tachycardia.  Was started on low-dose beta-blocker with metoprolol.  -in July metoprolol was titrated further to 25 mg twice a day  -was started on Eliquis 2.5 mg twice a day.  Lower dose was chosen due to high risk factors.  - Patient is currently on metoprolol 12.5 milligrams  twice daily.  I did not make changes to this today.  I reached out to the cardiologist to discuss case.    It was a pleasure evaluating your patient in the office today. Thank you for allowing our team to participate in the care of Ms Rosalind Wise. Please do not hesitate to contact our team if further issues/questions shall arise in the interim.     No problem-specific Assessment & Plan notes found for this encounter.        HPI:    pt denies fever, nausea, vomiting. No dysuria. Has chronic chills.  At the past visit, patient stated that she did not want to stress her family and preferred to keep the notes locked.  Therefore I have locked the note today also.  Patient's daughter was present for the visit today.    PATIENT INSTRUCTIONS:    Patient Instructions   1) Avoid NSAIDS - (Example - motrin, advil, ibuprofen, aleve, exederin, etc)  2) Always follow a low salt diet  3) If you have any issues with trouble with nausea, vomiting, urinating, blood in the urine, food tasting like metal, confusion, weakness, fatigue that is worsening, or any other questions, please call right away.  4) Please continue to follow regularly with your family physician and any other specialist that you are advised to see and continue to follow their recommendations  5) If you have any emergencies, please go to the nearest urgent care or emergency room and please inform your family physician and our office once you are able to.  6) please lower hydroxyzine to 10 mg tablet once as needed at bedtime. And do not take the 25 mg tab for now. If the 10 mg helps you better with regards to the daytime sleepiness you feel, please let Dr Lopez  7) labwork in 3 months for full labwork  8) magnesium labwork in one month (you can combine with Dr Lopez lab slips)  9) please call if your blood pressures are above 135 or less than 105 (top number) more than 2 days in a row      OBJECTIVE:  Current Weight: Weight - Scale: 68.5 kg (151 lb)  Vitals:     06/14/24 0955   BP: 132/84   BP Location: Left arm   Patient Position: Sitting   Cuff Size: Standard   Pulse: 78   SpO2: 100%   Weight: 68.5 kg (151 lb)   Height: 5' (1.524 m)    Body mass index is 29.49 kg/m².      REVIEW OF SYSTEMS:    Review of Systems   Constitutional: Negative.  Negative for fatigue.   HENT: Negative.     Eyes: Negative.    Respiratory: Negative.  Negative for shortness of breath.    Cardiovascular: Negative.  Negative for leg swelling.   Gastrointestinal: Negative.    Endocrine: Negative.    Genitourinary: Negative.  Negative for difficulty urinating.   Musculoskeletal: Negative.    Skin: Negative.    Allergic/Immunologic: Negative.    Neurological: Negative.    Hematological: Negative.    Psychiatric/Behavioral: Negative.     All other systems reviewed and are negative.      PHYSICAL EXAM:      Physical Exam  Vitals and nursing note reviewed.   Constitutional:       General: She is not in acute distress.     Appearance: She is well-developed. She is not diaphoretic.   HENT:      Head: Normocephalic and atraumatic.   Eyes:      General: No scleral icterus.        Right eye: No discharge.         Left eye: No discharge.      Conjunctiva/sclera: Conjunctivae normal.   Neck:      Vascular: No JVD.   Cardiovascular:      Rate and Rhythm: Normal rate and regular rhythm.      Heart sounds: No murmur heard.     No friction rub. No gallop.   Pulmonary:      Effort: Pulmonary effort is normal. No respiratory distress.      Breath sounds: Normal breath sounds. No rales.      Comments: Very mild wheeze lower base otherwise normal exam  Abdominal:      General: Bowel sounds are normal. There is no distension.      Palpations: Abdomen is soft.      Tenderness: There is no abdominal tenderness. There is no rebound.   Musculoskeletal:         General: No tenderness, deformity or edema. Normal range of motion.      Cervical back: Normal range of motion and neck supple.      Comments: Trace pitting edema  bilateral lower extremity   Skin:     General: Skin is warm and dry.      Coloration: Skin is not pale.      Findings: No erythema or rash.   Neurological:      Mental Status: She is alert and oriented to person, place, and time.      Coordination: Coordination normal.   Psychiatric:         Mood and Affect: Mood and affect normal.         Behavior: Behavior normal.         Thought Content: Thought content normal.         Judgment: Judgment normal.         Medications:    Current Outpatient Medications:     apixaban (Eliquis) 2.5 mg, Take 1 tablet (2.5 mg total) by mouth 2 (two) times a day, Disp: 180 tablet, Rfl: 3    atorvastatin (LIPITOR) 40 mg tablet, TAKE 1 TABLET EVERY EVENING, Disp: 90 tablet, Rfl: 1    bumetanide (BUMEX) 1 mg tablet, Take 1 tablet (1 mg total) by mouth daily, Disp: 90 tablet, Rfl: 3    Cholecalciferol (Vitamin D3) 50 MCG CAPS, Take by mouth in the morning, Disp: , Rfl:     escitalopram (LEXAPRO) 5 mg tablet, Take 1 tablet (5 mg total) by mouth daily, Disp: 30 tablet, Rfl: 0    hydrOXYzine HCL (ATARAX) 10 mg tablet, Take 1 tablet (10 mg total) by mouth daily at bedtime as needed (insomnia), Disp: 30 tablet, Rfl: 0    levothyroxine 88 mcg tablet, TAKE 1 TABLET EVERY DAY, Disp: 90 tablet, Rfl: 1    losartan (COZAAR) 25 mg tablet, TAKE 2 TABLETS IN THE MORNING AND TAKE 1 TABLET IN THE EVENING, Disp: 270 tablet, Rfl: 10    Magnesium 400 MG CAPS, Take by mouth in the morning Take 500 mg. BID, Disp: , Rfl:     metoprolol tartrate (LOPRESSOR) 25 mg tablet, TAKE 1/2 TABLET EVERY 12 HOURS, Disp: 90 tablet, Rfl: 1    pantoprazole (PROTONIX) 20 mg tablet, TAKE 1 TABLET EVERY DAY, Disp: 90 tablet, Rfl: 1    spironolactone (ALDACTONE) 25 mg tablet, Take 0.5 tablets (12.5 mg total) by mouth daily, Disp: 45 tablet, Rfl: 3    Diclofenac Sodium (VOLTAREN) 1 %, Apply 2 g topically in the morning APPLY NO MORE THAN 3 DAYS IN A ROW THEN TAKE A BREAK FOR ONE WEEK FROM USE., Disp: 100 g, Rfl: 1    senna-docusate  "sodium (SENOKOT S) 8.6-50 mg per tablet, Take 1 tablet by mouth daily (Patient not taking: Reported on 6/14/2024), Disp: 20 tablet, Rfl: 1    Laboratory Results:        Invalid input(s): \"ALBUMIN\"    Results for orders placed or performed during the hospital encounter of 04/29/24   Echo complete w/ contrast if indicated   Result Value Ref Range    BSA 1.63 m2    A4C EF 66 %    LVOT stroke volume 51.12     LVOT stroke volume index 31.90 ml/m2    LVOT Cardiac Output 4.44 l/min    LVOT Cardiac Index 2.73 l/min/m2    LVIDd 4.30 cm    LVIDS 2.90 cm    IVSd 1.00 cm    LVPWd 0.90 cm    LVOT diameter 1.8 cm    LVOT peak VTI 20.1 cm    FS 33 28 - 44    MV E' Tissue Velocity Septal 6 cm/s    LA Volume Index (BP) 100.0 mL/m2    E wave deceleration time 194 ms    MV Peak E Jesse 176 cm/s    AV LVOT peak gradient 3 mmHg    LVOT peak jesse 0.8 m/s    RVID d 3.6 cm    Tricuspid annular plane systolic excursion 1.50 cm    LA size 4.7 cm    LA length (A2C) 7.50 cm    LA volume (BP) 163 mL    RAA A4C 27.2 cm2    Aortic valve peak velocity 1.62 m/s    Ao VTI 35.94 cm    AV mean gradient 6 mmHg    LVOT mn grad 2.0 mmHg    AV peak gradient 11 mmHg    AV Deceleration Time 1,157 ms    AV regurgitation pressure 1/2 time 336 ms    AV area by cont VTI 1.4 cm2    AV area peak jesse 1.3 cm2    MV VTI RETROGRADE 170.4 cm    MV stenosis pressure 1/2 time 56 ms    MV valve area p 1/2 method 3.93     MV mean gradient retrograde 74 mmHg    MR  mmHg    TR Peak Jesse 4.7 m/s    Triscuspid Valve Regurgitation Peak Gradient 87.0 mmHg    Ao root 2.90 cm    AV peak gradient 45 mmHg    Aortic valve mean velocity 11.70 m/s    Mitral regurgitation peak velocity 5.26 m/s    Mitral valve mean inflow velocity 4.13 m/s    Tricuspid valve peak regurgitation velocity 4.66 m/s    Left ventricular stroke volume (2D) 50.00 mL    IVS 1 cm    LEFT VENTRICLE SYSTOLIC VOLUME (MOD BIPLANE) 2D 33 mL    LV DIASTOLIC VOLUME (MOD BIPLANE) 2D 83 mL    Left Atrium Area-systolic " Four Chamber 39.7 cm2    Left Atrium Area-systolic Apical Two Chamber 39.5 cm2    LVSV, 2D 50 mL    LVOT area 2.54 cm2    DVI 0.49     AV valve area 1.42 cm2    LV EF 55     Est. RA pres 8.0 mmHg    Right Ventricular Peak Systolic Pressure 95.00 mmHg

## 2024-06-14 NOTE — LETTER
June 14, 2024     Suzanna Lopez MD  [address] [facility], [place] [address]    Patient: Rosalind Wise   YOB: 1940   Date of Visit: 6/14/2024       Dear Dr. Lopez:    Thank you for referring Rosalind Wise to me for evaluation. Below are my notes for this consultation.    If you have questions, please do not hesitate to call me. I look forward to following your patient along with you.         Sincerely,        Ele Miller MD        CC: No Recipients    Ele Miller MD  6/14/2024 10:33 AM  Sign when Signing Visit  NEPHROLOGY OFFICE VISIT   Rosalind Wise 83 y.o. female MRN: [id number]  6/14/2024    Reason for Visit: Renal transplant follow-up    ASSESSMENT and PLAN:    I had the pleasure of seeing Ms Wise today in the renal clinic for the continued management of renal transplant f/u.     2/2020 - February with 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 on February 11th.  Completed course of Keppra for seizure prophylaxis.     3/30/2020 - losartan was increased to 50 mg twice a day from 50 in the morning and 25 in the evening due to high blood pressures     4/3/2020 - spironolactone 12.5 mg daily was started due to continued high blood pressure     4/2020 - patient had presented for replacement current of cranioplasty on April 6.  And subsequently was admitted to the rehab center postoperatively      March 2021- patient was admitted to hospital with not feeling well and   Headache.  CT scan of the head with no acute intracranial pathology. The other symptoms of memory loss were also present.  Patient was referred to geriatrician.     April 2021 - sciatic pain. Received brief steroid course     5/2021 - back pain. Went to ER. Given pred taper     5/2023 - neck pain.CT of spine -  no cervical spine fracture or traumatic malalignmen      October 2023-patient was in the ER for dizziness and headache.  Patient was given normal saline.  Had a CAT  scan completed.  CT of the head no acute intracranial abnormalities with unchanged large chronic encephalomalacia and gliosis in the right temporal lobe in the region of remote hemorrhage.  Chest x-ray was unrevealing.  Patient was discharged after volume expansion and feeling improved.     October 27, 2023-patient to follow-up with cardiology team.  For now mitral valve is being monitored. CT abdpel - no acute findings. Potential gastritis     March 26, 2024-patient had intentional overdose of prescribed medications and was admitted to the hospital.  Had ingested Zofran, Atarax, Plavix, Benadryl, Tylenol cold and flu tablets.  Received N-acetylcysteine.  Had signs of anticholinergic effects.  With regards to patient's hypertension, metoprolol was increased to 25 mg twice daily especially given that the patient was in A-fib with RVR requiring Cardizem drip.  Patient was admitted after discharge from medicine team to the psychiatric team for further mental health care.  Patient was started on Lexapro.  Was discharged in stable and improving condition..     83-year-old female with a past medical history of chronic kidney disease, venous stasis, HTN, hypothyroidism, lumbar canal stenosis, Anxiety, GERD, WENDI, neuropathy, Gout, EF 50-55%, Grade 2 diastolic dysfunction who presents for follow up for CKD.      1) CKD III - patient also has a long standing history of HTN. Creatinine in 2013, 0.9 mg/dL. Cr early 2016 was 0.80-0.9 mg/dL; then increased starting in august 2016 to 2.3 mg/dL and has fluctuated since. CT scan in 2016, kidneys appearing normal at that time.      Etiology of CKD may be long standing HTN and ATN. Baseline Cr ~ 1.1 -1.3 mg/dL     - Urine eos 0%;   - A1c controlled prior  at 5.6% 12/2022  - UPCR too low to quant (6/2023)  - UA bland 6/2023  -SPEP unrevealing  -UPEP unrevealing  - C3, C4 unrevealing  - Renal u/s with R 10.3 cm, L 10.4 cm, renal cortex 1 cm b/l; both kidneys slightly reduced in size;  lower pole of R kidney is non obstructing calculus  - 10/2023 - CT without hydro on kidney  - Pt follows with Urology team for nephrolithiasis.   - No NSAIDs, the patient is not currently taking NSAID  -nuc stress test per Cardiology in Jan 2019 unrevealing  - repeat renal u/s 1/2019 - unrevealing with exception of renal cortical atrophy; but also 6 mm non shadowing calculus.     Overall, patient is seen for follow-up June 2024.  Since last being seen, no new lab work.  Lab work from March 2024 showed stable renal function with magnesium okay at 1.7.  Hemoglobin was at goal.  Urinalysis was relatively bland.  Also patient has completed echocardiogram in April with mitral regurgitation but otherwise stable per cardiology team.     Plan:     -No changes to antihypertensive regimen today  - Continue holding amlodipine for now  - Continue spironolactone 12.1/2 mg daily  - Continue losartan 50 mg in the morning and 25 mg in the evening  - Continue Bumex 1 mg once a day  - Lab work in 3 months  - appt in 4-5 months  - check mag level in 1 month as on mag supplement since hospitalization  - Patient states with hydroxyzine it works really well at 25 mg but may be working too well given that she is also sleepy during the day now.  I asked her to lowered to 10 mg tablets and I sent her a refill for 1 month and I will defer further prescription of this to primary care physician and I have informed the primary care physician also.     2) SOB - Volume - follows with Cardiology-resolved     - on Bumex.  lowered slightly to 1 mg once a day on December 15, 2023 as the patient may be becoming hypovolemic and patient is euvolemic.     3) HTN -      - cont losartan, bumex, spironolactone, metoprolol  -  due to bradycardia, beta-blocker was held prior but given new onset atrial fibrillation around May 2022, patient was restarted on metoprolol per Cardiology team  - December 2023-lower Bumex to 1 mg once daily due to concern for  hypovolemia  - March 2024-it appears that the patient's amlodipine was held in the hospital.  And patient's metoprolol was increased.  Blood pressures are appropriate April 9, 2024 at appointment.  - 6/2024 -  syst. States was stressed coming to office.  Repeat blood pressures still 130 systolic in the office.  She states at home and her daughter confirms that blood pressures are mainly 1 16-1 20s systolic.  Therefore no changes for now but gave parameters for the patient to call us if they rise consistently above 135 systolic     4) hypothyroid - as per Primary Care Physician     5) HPL - Primary Care following.      6) Electrolytes - stable     7) MBD -     - Vit D 50.7 in nov 2019 --> 40.8 March 2022 --> 31.7 March 2024-appropriate  - PTH improved 89 in nov 2019 --> 78.6 in June 2020 --> 88 3/8/2022 --> 136 December 2022 -->109 December 2023.  Appropriate for now.     8) gout -      -monitor for flare     9) back pain - follows with Pain management     - pain sig improved with inj     10) Colonoscopy in feb with internal hemorrhoids and polyp removed.      11) alk phos elevation     -improved     12) Anemia - Hb stable     -hemoglobin stable and at goal     13) Mitral valve calcification     - moderate to severe mitral regurgitation-stable on repeat echocardiogram April 2024 per cardiology team  -eventually may require further evaluation.  Being monitored for now conservatively.  - per Cardiology team     14) elevated D-dimer prior-patient was given duplex of lower extremities which was unrevealing prior     15) knee pain after fall in august 2020     - saw Orthopedic team  - steroid injection was given     16) intracranial hemorrhage with mass effect requiring craniotomy on 2/11/2020     - now is status post replacement of cranioplasty in April 2020     17) mental health-suicide attempt 2024, anxiety-  management per mental health teams and primary care physician      18)   Zoster infection in November  2020-treated with Valtrex per primary team     19) a fib - onset in May 2022     -was advised to have Holter monitor.  There is concern for tachy-hola syndrome initially and Holter monitor showed periods of atrial fibrillation with RVR and nonsustained ventricular tachycardia.  Was started on low-dose beta-blocker with metoprolol.  -in July metoprolol was titrated further to 25 mg twice a day  -was started on Eliquis 2.5 mg twice a day.  Lower dose was chosen due to high risk factors.  - Patient is currently on metoprolol 12.5 milligrams twice daily.  I did not make changes to this today.  I reached out to the cardiologist to discuss case.    It was a pleasure evaluating your patient in the office today. Thank you for allowing our team to participate in the care of Ms Rosalind Wise. Please do not hesitate to contact our team if further issues/questions shall arise in the interim.     No problem-specific Assessment & Plan notes found for this encounter.        HPI:    pt denies fever, nausea, vomiting. No dysuria. Has chronic chills.  At the past visit, patient stated that she did not want to stress her family and preferred to keep the notes locked.  Therefore I have locked the note today also.  Patient's daughter was present for the visit today.    PATIENT INSTRUCTIONS:    Patient Instructions   1) Avoid NSAIDS - (Example - motrin, advil, ibuprofen, aleve, exederin, etc)  2) Always follow a low salt diet  3) If you have any issues with trouble with nausea, vomiting, urinating, blood in the urine, food tasting like metal, confusion, weakness, fatigue that is worsening, or any other questions, please call right away.  4) Please continue to follow regularly with your family physician and any other specialist that you are advised to see and continue to follow their recommendations  5) If you have any emergencies, please go to the nearest urgent care or emergency room and please inform your family physician and our  office once you are able to.  6) please lower hydroxyzine to 10 mg tablet once as needed at bedtime. And do not take the 25 mg tab for now. If the 10 mg helps you better with regards to the daytime sleepiness you feel, please let Dr Lopez  7) labwork in 3 months for full labwork  8) magnesium labwork in one month (you can combine with Dr Lopez lab slips)  9) please call if your blood pressures are above 135 or less than 105 (top number) more than 2 days in a row      OBJECTIVE:  Current Weight: Weight - Scale: 68.5 kg (151 lb)  Vitals:    06/14/24 0955   BP: 132/84   BP Location: Left arm   Patient Position: Sitting   Cuff Size: Standard   Pulse: 78   SpO2: 100%   Weight: 68.5 kg (151 lb)   Height: 5' (1.524 m)    Body mass index is 29.49 kg/m².      REVIEW OF SYSTEMS:    Review of Systems   Constitutional: Negative.  Negative for fatigue.   HENT: Negative.     Eyes: Negative.    Respiratory: Negative.  Negative for shortness of breath.    Cardiovascular: Negative.  Negative for leg swelling.   Gastrointestinal: Negative.    Endocrine: Negative.    Genitourinary: Negative.  Negative for difficulty urinating.   Musculoskeletal: Negative.    Skin: Negative.    Allergic/Immunologic: Negative.    Neurological: Negative.    Hematological: Negative.    Psychiatric/Behavioral: Negative.     All other systems reviewed and are negative.      PHYSICAL EXAM:      Physical Exam  Vitals and nursing note reviewed.   Constitutional:       General: She is not in acute distress.     Appearance: She is well-developed. She is not diaphoretic.   HENT:      Head: Normocephalic and atraumatic.   Eyes:      General: No scleral icterus.        Right eye: No discharge.         Left eye: No discharge.      Conjunctiva/sclera: Conjunctivae normal.   Neck:      Vascular: No JVD.   Cardiovascular:      Rate and Rhythm: Normal rate and regular rhythm.      Heart sounds: No murmur heard.     No friction rub. No gallop.   Pulmonary:      Effort:  Pulmonary effort is normal. No respiratory distress.      Breath sounds: Normal breath sounds. No rales.      Comments: Very mild wheeze lower base otherwise normal exam  Abdominal:      General: Bowel sounds are normal. There is no distension.      Palpations: Abdomen is soft.      Tenderness: There is no abdominal tenderness. There is no rebound.   Musculoskeletal:         General: No tenderness, deformity or edema. Normal range of motion.      Cervical back: Normal range of motion and neck supple.      Comments: Trace pitting edema bilateral lower extremity   Skin:     General: Skin is warm and dry.      Coloration: Skin is not pale.      Findings: No erythema or rash.   Neurological:      Mental Status: She is alert and oriented to person, place, and time.      Coordination: Coordination normal.   Psychiatric:         Mood and Affect: Mood and affect normal.         Behavior: Behavior normal.         Thought Content: Thought content normal.         Judgment: Judgment normal.         Medications:    Current Outpatient Medications:   •  apixaban (Eliquis) 2.5 mg, Take 1 tablet (2.5 mg total) by mouth 2 (two) times a day, Disp: 180 tablet, Rfl: 3  •  atorvastatin (LIPITOR) 40 mg tablet, TAKE 1 TABLET EVERY EVENING, Disp: 90 tablet, Rfl: 1  •  bumetanide (BUMEX) 1 mg tablet, Take 1 tablet (1 mg total) by mouth daily, Disp: 90 tablet, Rfl: 3  •  Cholecalciferol (Vitamin D3) 50 MCG CAPS, Take by mouth in the morning, Disp: , Rfl:   •  escitalopram (LEXAPRO) 5 mg tablet, Take 1 tablet (5 mg total) by mouth daily, Disp: 30 tablet, Rfl: 0  •  hydrOXYzine HCL (ATARAX) 10 mg tablet, Take 1 tablet (10 mg total) by mouth daily at bedtime as needed (insomnia), Disp: 30 tablet, Rfl: 0  •  levothyroxine 88 mcg tablet, TAKE 1 TABLET EVERY DAY, Disp: 90 tablet, Rfl: 1  •  losartan (COZAAR) 25 mg tablet, TAKE 2 TABLETS IN THE MORNING AND TAKE 1 TABLET IN THE EVENING, Disp: 270 tablet, Rfl: 10  •  Magnesium 400 MG CAPS, Take by  "mouth in the morning Take 500 mg. BID, Disp: , Rfl:   •  metoprolol tartrate (LOPRESSOR) 25 mg tablet, TAKE 1/2 TABLET EVERY 12 HOURS, Disp: 90 tablet, Rfl: 1  •  pantoprazole (PROTONIX) 20 mg tablet, TAKE 1 TABLET EVERY DAY, Disp: 90 tablet, Rfl: 1  •  spironolactone (ALDACTONE) 25 mg tablet, Take 0.5 tablets (12.5 mg total) by mouth daily, Disp: 45 tablet, Rfl: 3  •  Diclofenac Sodium (VOLTAREN) 1 %, Apply 2 g topically in the morning APPLY NO MORE THAN 3 DAYS IN A ROW THEN TAKE A BREAK FOR ONE WEEK FROM USE., Disp: 100 g, Rfl: 1  •  senna-docusate sodium (SENOKOT S) 8.6-50 mg per tablet, Take 1 tablet by mouth daily (Patient not taking: Reported on 6/14/2024), Disp: 20 tablet, Rfl: 1    Laboratory Results:        Invalid input(s): \"ALBUMIN\"    Results for orders placed or performed during the hospital encounter of 04/29/24   Echo complete w/ contrast if indicated   Result Value Ref Range    BSA 1.63 m2    A4C EF 66 %    LVOT stroke volume 51.12     LVOT stroke volume index 31.90 ml/m2    LVOT Cardiac Output 4.44 l/min    LVOT Cardiac Index 2.73 l/min/m2    LVIDd 4.30 cm    LVIDS 2.90 cm    IVSd 1.00 cm    LVPWd 0.90 cm    LVOT diameter 1.8 cm    LVOT peak VTI 20.1 cm    FS 33 28 - 44    MV E' Tissue Velocity Septal 6 cm/s    LA Volume Index (BP) 100.0 mL/m2    E wave deceleration time 194 ms    MV Peak E Jesse 176 cm/s    AV LVOT peak gradient 3 mmHg    LVOT peak jesse 0.8 m/s    RVID d 3.6 cm    Tricuspid annular plane systolic excursion 1.50 cm    LA size 4.7 cm    LA length (A2C) 7.50 cm    LA volume (BP) 163 mL    RAA A4C 27.2 cm2    Aortic valve peak velocity 1.62 m/s    Ao VTI 35.94 cm    AV mean gradient 6 mmHg    LVOT mn grad 2.0 mmHg    AV peak gradient 11 mmHg    AV Deceleration Time 1,157 ms    AV regurgitation pressure 1/2 time 336 ms    AV area by cont VTI 1.4 cm2    AV area peak jesse 1.3 cm2    MV VTI RETROGRADE 170.4 cm    MV stenosis pressure 1/2 time 56 ms    MV valve area p 1/2 method 3.93     MV " mean gradient retrograde 74 mmHg    MR  mmHg    TR Peak Jesse 4.7 m/s    Triscuspid Valve Regurgitation Peak Gradient 87.0 mmHg    Ao root 2.90 cm    AV peak gradient 45 mmHg    Aortic valve mean velocity 11.70 m/s    Mitral regurgitation peak velocity 5.26 m/s    Mitral valve mean inflow velocity 4.13 m/s    Tricuspid valve peak regurgitation velocity 4.66 m/s    Left ventricular stroke volume (2D) 50.00 mL    IVS 1 cm    LEFT VENTRICLE SYSTOLIC VOLUME (MOD BIPLANE) 2D 33 mL    LV DIASTOLIC VOLUME (MOD BIPLANE) 2D 83 mL    Left Atrium Area-systolic Four Chamber 39.7 cm2    Left Atrium Area-systolic Apical Two Chamber 39.5 cm2    LVSV, 2D 50 mL    LVOT area 2.54 cm2    DVI 0.49     AV valve area 1.42 cm2    LV EF 55     Est. RA pres 8.0 mmHg    Right Ventricular Peak Systolic Pressure 95.00 mmHg

## 2024-06-14 NOTE — PATIENT INSTRUCTIONS
1) Avoid NSAIDS - (Example - motrin, advil, ibuprofen, aleve, exederin, etc)  2) Always follow a low salt diet  3) If you have any issues with trouble with nausea, vomiting, urinating, blood in the urine, food tasting like metal, confusion, weakness, fatigue that is worsening, or any other questions, please call right away.  4) Please continue to follow regularly with your family physician and any other specialist that you are advised to see and continue to follow their recommendations  5) If you have any emergencies, please go to the nearest urgent care or emergency room and please inform your family physician and our office once you are able to.  6) please lower hydroxyzine to 10 mg tablet once as needed at bedtime. And do not take the 25 mg tab for now. If the 10 mg helps you better with regards to the daytime sleepiness you feel, please let Dr Lopez  7) labwork in 3 months for full labwork  8) magnesium labwork in one month (you can combine with Dr Lopez lab slips)  9) please call if your blood pressures are above 135 or less than 105 (top number) more than 2 days in a row

## 2024-06-17 ENCOUNTER — TELEPHONE (OUTPATIENT)
Dept: PSYCHIATRY | Facility: CLINIC | Age: 84
End: 2024-06-17

## 2024-06-17 NOTE — TELEPHONE ENCOUNTER
Daughter called back and stated her Mother does not need medication. She is being followed by her primary.pt was inpatient back in April and was told in order to be d/c and medicare pay for her inpatient stay she would need to be seen by an out patient Psyc. Patient will call back id needed

## 2024-07-03 ENCOUNTER — LAB (OUTPATIENT)
Dept: LAB | Facility: CLINIC | Age: 84
End: 2024-07-03
Payer: MEDICARE

## 2024-07-03 DIAGNOSIS — E78.5 HYPERLIPIDEMIA, UNSPECIFIED HYPERLIPIDEMIA TYPE: ICD-10-CM

## 2024-07-03 DIAGNOSIS — R73.03 PREDIABETES: ICD-10-CM

## 2024-07-03 DIAGNOSIS — N18.30 STAGE 3 CHRONIC KIDNEY DISEASE, UNSPECIFIED WHETHER STAGE 3A OR 3B CKD (HCC): ICD-10-CM

## 2024-07-03 DIAGNOSIS — E03.9 HYPOTHYROIDISM, UNSPECIFIED TYPE: ICD-10-CM

## 2024-07-03 DIAGNOSIS — I10 BENIGN ESSENTIAL HTN: ICD-10-CM

## 2024-07-03 DIAGNOSIS — N25.81 SECONDARY HYPERPARATHYROIDISM (HCC): ICD-10-CM

## 2024-07-03 LAB
ALBUMIN SERPL BCG-MCNC: 3.8 G/DL (ref 3.5–5)
ALP SERPL-CCNC: 89 U/L (ref 34–104)
ALT SERPL W P-5'-P-CCNC: 10 U/L (ref 7–52)
ANION GAP SERPL CALCULATED.3IONS-SCNC: 7 MMOL/L (ref 4–13)
AST SERPL W P-5'-P-CCNC: 14 U/L (ref 13–39)
BACTERIA UR QL AUTO: ABNORMAL /HPF
BILIRUB SERPL-MCNC: 0.47 MG/DL (ref 0.2–1)
BILIRUB UR QL STRIP: NEGATIVE
BUN SERPL-MCNC: 31 MG/DL (ref 5–25)
CALCIUM SERPL-MCNC: 9.1 MG/DL (ref 8.4–10.2)
CHLORIDE SERPL-SCNC: 106 MMOL/L (ref 96–108)
CHOLEST SERPL-MCNC: 102 MG/DL
CLARITY UR: CLEAR
CO2 SERPL-SCNC: 30 MMOL/L (ref 21–32)
COLOR UR: ABNORMAL
CREAT SERPL-MCNC: 1.09 MG/DL (ref 0.6–1.3)
CREAT UR-MCNC: 88.6 MG/DL
CREAT UR-MCNC: 88.6 MG/DL
ERYTHROCYTE [DISTWIDTH] IN BLOOD BY AUTOMATED COUNT: 14.6 % (ref 11.6–15.1)
EST. AVERAGE GLUCOSE BLD GHB EST-MCNC: 126 MG/DL
GFR SERPL CREATININE-BSD FRML MDRD: 46 ML/MIN/1.73SQ M
GLUCOSE P FAST SERPL-MCNC: 85 MG/DL (ref 65–99)
GLUCOSE UR STRIP-MCNC: NEGATIVE MG/DL
HBA1C MFR BLD: 6 %
HCT VFR BLD AUTO: 38 % (ref 34.8–46.1)
HDLC SERPL-MCNC: 49 MG/DL
HGB BLD-MCNC: 11.9 G/DL (ref 11.5–15.4)
HGB UR QL STRIP.AUTO: NEGATIVE
KETONES UR STRIP-MCNC: NEGATIVE MG/DL
LDLC SERPL CALC-MCNC: 35 MG/DL (ref 0–100)
LEUKOCYTE ESTERASE UR QL STRIP: NEGATIVE
MAGNESIUM SERPL-MCNC: 2.2 MG/DL (ref 1.9–2.7)
MCH RBC QN AUTO: 28.3 PG (ref 26.8–34.3)
MCHC RBC AUTO-ENTMCNC: 31.3 G/DL (ref 31.4–37.4)
MCV RBC AUTO: 91 FL (ref 82–98)
MICROALBUMIN UR-MCNC: <7 MG/L
NITRITE UR QL STRIP: NEGATIVE
NON-SQ EPI CELLS URNS QL MICRO: ABNORMAL /HPF
NONHDLC SERPL-MCNC: 53 MG/DL
PH UR STRIP.AUTO: 8 [PH]
PLATELET # BLD AUTO: 190 THOUSANDS/UL (ref 149–390)
PMV BLD AUTO: 11 FL (ref 8.9–12.7)
POTASSIUM SERPL-SCNC: 4.7 MMOL/L (ref 3.5–5.3)
PROT SERPL-MCNC: 6.4 G/DL (ref 6.4–8.4)
PROT UR STRIP-MCNC: ABNORMAL MG/DL
PROT UR-MCNC: 20 MG/DL
PROT/CREAT UR: 0.23 MG/G{CREAT} (ref 0–0.1)
RBC # BLD AUTO: 4.2 MILLION/UL (ref 3.81–5.12)
RBC #/AREA URNS AUTO: ABNORMAL /HPF
SODIUM SERPL-SCNC: 143 MMOL/L (ref 135–147)
SP GR UR STRIP.AUTO: 1.02 (ref 1–1.03)
TRIGL SERPL-MCNC: 89 MG/DL
TSH SERPL DL<=0.05 MIU/L-ACNC: 1.38 UIU/ML (ref 0.45–4.5)
UROBILINOGEN UR STRIP-ACNC: <2 MG/DL
WBC # BLD AUTO: 6.47 THOUSAND/UL (ref 4.31–10.16)
WBC #/AREA URNS AUTO: ABNORMAL /HPF

## 2024-07-03 PROCEDURE — 85027 COMPLETE CBC AUTOMATED: CPT

## 2024-07-03 PROCEDURE — 81001 URINALYSIS AUTO W/SCOPE: CPT

## 2024-07-03 PROCEDURE — 82043 UR ALBUMIN QUANTITATIVE: CPT

## 2024-07-03 PROCEDURE — 83036 HEMOGLOBIN GLYCOSYLATED A1C: CPT

## 2024-07-03 PROCEDURE — 80053 COMPREHEN METABOLIC PANEL: CPT

## 2024-07-03 PROCEDURE — 82570 ASSAY OF URINE CREATININE: CPT

## 2024-07-03 PROCEDURE — 36415 COLL VENOUS BLD VENIPUNCTURE: CPT

## 2024-07-03 PROCEDURE — 84443 ASSAY THYROID STIM HORMONE: CPT

## 2024-07-03 PROCEDURE — 80061 LIPID PANEL: CPT

## 2024-07-03 PROCEDURE — 84156 ASSAY OF PROTEIN URINE: CPT

## 2024-07-05 ENCOUNTER — TELEPHONE (OUTPATIENT)
Dept: NEPHROLOGY | Facility: CLINIC | Age: 84
End: 2024-07-05

## 2024-07-05 NOTE — TELEPHONE ENCOUNTER
Pt advised that labs of 7/3/24 show stable kidney function and normal mag level.  Per Dr. Miller, no changes.    ----- Message from Ele Miller MD sent at 7/5/2024  9:10 AM EDT -----  Hello    Patient normally is followed up by Ms India Morejon.     Please let pt know that renal function stable, mag nl. Therefore no changes.     Thank you    np

## 2024-07-05 NOTE — RESULT ENCOUNTER NOTE
Hello    Patient normally is followed up by Ms India Morejon.     Please let pt know that renal function stable, mag nl. Therefore no changes.     Thank you    np

## 2024-07-08 ENCOUNTER — OFFICE VISIT (OUTPATIENT)
Dept: FAMILY MEDICINE CLINIC | Facility: CLINIC | Age: 84
End: 2024-07-08
Payer: MEDICARE

## 2024-07-08 VITALS
SYSTOLIC BLOOD PRESSURE: 118 MMHG | HEIGHT: 60 IN | HEART RATE: 83 BPM | BODY MASS INDEX: 29.64 KG/M2 | OXYGEN SATURATION: 98 % | DIASTOLIC BLOOD PRESSURE: 72 MMHG | WEIGHT: 151 LBS

## 2024-07-08 DIAGNOSIS — R73.03 PREDIABETES: ICD-10-CM

## 2024-07-08 DIAGNOSIS — I48.91 ATRIAL FIBRILLATION WITH RVR (HCC): ICD-10-CM

## 2024-07-08 DIAGNOSIS — E78.5 HYPERLIPIDEMIA, UNSPECIFIED HYPERLIPIDEMIA TYPE: ICD-10-CM

## 2024-07-08 DIAGNOSIS — G47.09 OTHER INSOMNIA: Primary | ICD-10-CM

## 2024-07-08 DIAGNOSIS — I48.0 PAROXYSMAL ATRIAL FIBRILLATION (HCC): ICD-10-CM

## 2024-07-08 DIAGNOSIS — I13.0 HYPERTENSIVE HEART AND CHRONIC KIDNEY DISEASE WITH HEART FAILURE AND STAGE 1 THROUGH STAGE 4 CHRONIC KIDNEY DISEASE, OR CHRONIC KIDNEY DISEASE (HCC): ICD-10-CM

## 2024-07-08 DIAGNOSIS — E03.9 HYPOTHYROIDISM, UNSPECIFIED TYPE: Chronic | ICD-10-CM

## 2024-07-08 DIAGNOSIS — I49.1 PAC (PREMATURE ATRIAL CONTRACTION): ICD-10-CM

## 2024-07-08 DIAGNOSIS — K21.9 GASTROESOPHAGEAL REFLUX DISEASE WITHOUT ESOPHAGITIS: Chronic | ICD-10-CM

## 2024-07-08 DIAGNOSIS — Z00.00 MEDICARE ANNUAL WELLNESS VISIT, SUBSEQUENT: ICD-10-CM

## 2024-07-08 DIAGNOSIS — F33.1 MODERATE EPISODE OF RECURRENT MAJOR DEPRESSIVE DISORDER (HCC): ICD-10-CM

## 2024-07-08 PROBLEM — T14.91XA SUICIDE ATTEMPT (HCC): Status: RESOLVED | Noted: 2024-04-04 | Resolved: 2024-07-08

## 2024-07-08 PROCEDURE — 99215 OFFICE O/P EST HI 40 MIN: CPT | Performed by: FAMILY MEDICINE

## 2024-07-08 PROCEDURE — G0439 PPPS, SUBSEQ VISIT: HCPCS | Performed by: FAMILY MEDICINE

## 2024-07-08 RX ORDER — PANTOPRAZOLE SODIUM 20 MG/1
20 TABLET, DELAYED RELEASE ORAL DAILY
Qty: 90 TABLET | Refills: 1 | Status: SHIPPED | OUTPATIENT
Start: 2024-07-08

## 2024-07-08 RX ORDER — HYDROXYZINE HYDROCHLORIDE 10 MG/1
10 TABLET, FILM COATED ORAL
Qty: 90 TABLET | Refills: 1 | Status: SHIPPED | OUTPATIENT
Start: 2024-07-08

## 2024-07-08 RX ORDER — LEVOTHYROXINE SODIUM 88 UG/1
88 TABLET ORAL DAILY
Qty: 90 TABLET | Refills: 1 | Status: SHIPPED | OUTPATIENT
Start: 2024-07-08

## 2024-07-08 NOTE — PROGRESS NOTES
Ambulatory Visit  Name: Rosalind Wise      : 1940      MRN: [id number]  Encounter Provider: Suzanna Lopez MD  Encounter Date: 2024   Encounter department: [facility]    Assessment & Plan   1. Other insomnia  Assessment & Plan:  Symptoms well-controlled on Atarax 10 mg nightly.  Continue same.  Patient/family aware of the potential side effects of the medication.  Patient puts 7 pills at a time in her pillbox.     Orders:  -     hydrOXYzine HCL (ATARAX) 10 mg tablet; Take 1 tablet (10 mg total) by mouth daily at bedtime as needed (insomnia)  2. Hypothyroidism, unspecified type  Assessment & Plan:  TSH stable.  Continue levothyroxine 88 mcg   Orders:  -     levothyroxine 88 mcg tablet; Take 1 tablet (88 mcg total) by mouth daily  3. PAC (premature atrial contraction)  -     pantoprazole (PROTONIX) 20 mg tablet; Take 1 tablet (20 mg total) by mouth daily  4. Paroxysmal atrial fibrillation (HCC)  Assessment & Plan:  Rate controlled on metoprolol.  Continue same.  Orders:  -     pantoprazole (PROTONIX) 20 mg tablet; Take 1 tablet (20 mg total) by mouth daily  5. Atrial fibrillation with RVR (HCC)  Assessment & Plan:  Patient maintained on metoprolol and Eliquis twice daily   6. Hypertensive heart and chronic kidney disease with heart failure and stage 1 through stage 4 chronic kidney disease, or chronic kidney disease (HCC)  Assessment & Plan:  Wt Readings from Last 3 Encounters:   24 68.5 kg (151 lb)   24 68.5 kg (151 lb)   24 68.9 kg (152 lb)   Stable, continue BB, bumetanide per cardiology          7. Gastroesophageal reflux disease without esophagitis  Assessment & Plan:  Stable on pantoprazole 20 mg daily.  Continue same.  8. Moderate episode of recurrent major depressive disorder (HCC)  Assessment & Plan:  Patient states that she is not depressed anymore.  Prefers not to be on any medication at this point  9. Hyperlipidemia, unspecified hyperlipidemia  type  Assessment & Plan:  LDL is at goal.  Continue atorvastatin 40 mg.  Metabolic labs/follow-up 6 month interval  10. Prediabetes  Assessment & Plan:  Reviewed with patient today.  A1c stable at 6.0  Encouraged to continue with low carb diet.  Continue monitoring.  11. Medicare annual wellness visit, subsequent  Assessment & Plan:  UTD.         Depression Screening and Follow-up Plan: Patient's depression screening was positive with a PHQ-9 score of 5. Clincally patient does not have depression. No treatment is required.       Preventive health issues were discussed with patient, and age appropriate screening tests were ordered as noted in patient's After Visit Summary. Personalized health advice and appropriate referrals for health education or preventive services given if needed, as noted in patient's After Visit Summary.    History of Present Illness     HPI     Patient is following up on her chronic medical problems.  Has history of hypertension, dyslipidemia, prediabetes, CKD, CHF, A-fib.   Denies any symptoms of chest pain or shortness of breath.  Lives with her son and daughter-in-law.  Metabolic labs reviewed with patient today.  Her fasting blood sugar, cholesterol is at goal, A1c 6.0.  Denies any chest pain/ sob.   Tolerating all medications well.  Denies any symptoms of depression.  Has chronic insomnia, well-controlled on Atarax 10 mg.  Patient requesting medication refills.  Patient reports abdominal pain which she experienced yesterday, associated with increased burping.  Patient has a history of hiatal hernia in the past, diverticulosis.  Reports upset stomach which occurred 2 weeks ago with intermittent bouts of vomiting and diarrhea.  The symptoms have resolved now.  Today her abdominal pain has improved.  Patient has not experienced any vomiting or diarrhea.  Patient Care Team:  Suzanna Lopez MD as PCP - General  MD Damon Olea MD Gbolabo Sokunbi, MD Gerard Delmonico,  MD Alejandro Carlson MD as Endoscopist  Ele Miller MD (Nephrology)    Review of Systems   Constitutional: Negative.    Respiratory: Negative.     Cardiovascular: Negative.    Gastrointestinal: Negative.      Medical History Reviewed by provider this encounter:       Annual Wellness Visit Questionnaire   Rosalind is here for her Subsequent Wellness visit. Last Medicare Wellness visit information reviewed, patient interviewed, no change since last AWV.     Health Risk Assessment:   Patient rates overall health as good. Patient feels that their physical health rating is same. Patient is satisfied with their life. Eyesight was rated as same. Hearing was rated as same. Patient feels that their emotional and mental health rating is same. Patients states they are never, rarely angry. Patient states they are sometimes unusually tired/fatigued. Pain experienced in the last 7 days has been some. Patient's pain rating has been 4/10. Patient states that she has experienced weight loss or gain in last 6 months.     Depression Screening:   PHQ-9 Score: 5      Fall Risk Screening:   In the past year, patient has experienced: history of falling in past year    Injured during fall?: No    Feels unsteady when standing or walking?: Yes    Worried about falling?: Yes      Urinary Incontinence Screening:   Patient has not leaked urine accidently in the last six months.     Home Safety:  Patient does not have trouble with stairs inside or outside of their home. Patient has working smoke alarms and has working carbon monoxide detector. Home safety hazards include: medications that cause fatigue.     Nutrition:   Current diet is Other (please comment). Little appetite most days    Medications:   Patient is currently taking over-the-counter supplements. OTC medications include: see medication list. Patient is able to manage medications.     Activities of Daily Living (ADLs)/Instrumental Activities of Daily Living (IADLs):    Walk and transfer into and out of bed and chair?: Yes  Dress and groom yourself?: Yes    Bathe or shower yourself?: Yes    Feed yourself? 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 Equipment Suppliers  [employer]    Previous Hospitalizations:   Any hospitalizations or ED visits within the last 12 months?: Yes    How many hospitalizations have you had in the last year?: 1-2    Advance Care Planning:   Living will: Yes    Durable POA for healthcare: Yes    Advanced directive: Yes      Cognitive Screening:   Provider or family/friend/caregiver concerned regarding cognition?: No    PREVENTIVE SCREENINGS      Cardiovascular Screening:    General: Screening Not Indicated and History Lipid Disorder      Diabetes Screening:     General: Screening Current      Colorectal Cancer Screening:     General: Screening Not Indicated      Breast Cancer Screening:     General: Screening Not Indicated      Cervical Cancer Screening:    General: Screening Not Indicated      Osteoporosis Screening:    General: Patient Declines      Abdominal Aortic Aneurysm (AAA) Screening:        General: Screening Not Indicated      Lung Cancer Screening:     General: Screening Not Indicated      Hepatitis C Screening:    General: Patient Declines    Screening, Brief Intervention, and Referral to Treatment (SBIRT)    Screening  Typical number of drinks in a day: 0  Typical number of drinks in a week: 0  Interpretation: Low risk drinking behavior.    AUDIT-C Screenin) How often did you have a drink containing alcohol in the past year? monthly or less  2) How many drinks did you have on a typical day when you were drinking in the past year? 1 to 2  3) How often did you have 6 or more drinks on one occasion in the past year? never    AUDIT-C Score: 1  Interpretation: Score 0-2 (female): Negative screen for  alcohol misuse    Single Item Drug Screening:  How often have you used an illegal drug (including marijuana) or a prescription medication for non-medical reasons in the past year? never    Single Item Drug Screen Score: 0  Interpretation: Negative screen for possible drug use disorder    Social Determinants of Health     Financial Resource Strain: Low Risk  (7/5/2023)    Overall Financial Resource Strain (CARDIA)     Difficulty of Paying Living Expenses: Not hard at all   Food Insecurity: No Food Insecurity (7/8/2024)    Hunger Vital Sign     Worried About Running Out of Food in the Last Year: Never true     Ran Out of Food in the Last Year: Never true   Transportation Needs: No Transportation Needs (7/8/2024)    PRAPARE - Transportation     Lack of Transportation (Medical): No     Lack of Transportation (Non-Medical): No   Housing Stability: Low Risk  (7/8/2024)    Housing Stability Vital Sign     Unable to Pay for Housing in the Last Year: No     Number of Times Moved in the Last Year: 0     Homeless in the Last Year: No   Utilities: Not At Risk (7/8/2024)    [facility] Utilities     Threatened with loss of utilities: No     No results found.    Objective     There were no vitals taken for this visit.    Physical Exam  Constitutional:       Appearance: Normal appearance.   Cardiovascular:      Heart sounds: Normal heart sounds.   Pulmonary:      Breath sounds: Normal breath sounds.   Abdominal:      General: There is no distension.      Palpations: Abdomen is soft.      Tenderness: There is no abdominal tenderness. There is no guarding or rebound.   Musculoskeletal:      Right lower leg: No edema.      Left lower leg: No edema.   Neurological:      Mental Status: She is oriented to person, place, and time.   Psychiatric:         Mood and Affect: Mood normal.       Administrative Statements   I have spent a total time of 40 minutes in caring for this patient on the day of the visit/encounter including Diagnostic results,  Prognosis, Risks and benefits of tx options, Instructions for management, Patient and family education, Importance of tx compliance, Risk factor reductions, Impressions, Counseling / Coordination of care, Documenting in the medical record, Reviewing / ordering tests, medicine, procedures  , and Obtaining or reviewing history  .

## 2024-07-08 NOTE — ASSESSMENT & PLAN NOTE
Symptoms well-controlled on Atarax 10 mg nightly.  Continue same.  Patient/family aware of the potential side effects of the medication.  Patient puts 7 pills at a time in her pillbox.

## 2024-07-08 NOTE — ASSESSMENT & PLAN NOTE
Wt Readings from Last 3 Encounters:   07/08/24 68.5 kg (151 lb)   06/14/24 68.5 kg (151 lb)   05/20/24 68.9 kg (152 lb)   Stable, continue BB, bumetanide per cardiology

## 2024-07-08 NOTE — PATIENT INSTRUCTIONS
Medicare Preventive Visit Patient Instructions  Thank you for completing your Welcome to Medicare Visit or Medicare Annual Wellness Visit today. Your next wellness visit will be due in one year (7/9/2025).  The screening/preventive services that you may require over the next 5-10 years are detailed below. Some tests may not apply to you based off risk factors and/or age. Screening tests ordered at today's visit but not completed yet may show as past due. Also, please note that scanned in results may not display below.  Preventive Screenings:  Service Recommendations Previous Testing/Comments   Colorectal Cancer Screening  * Colonoscopy    * Fecal Occult Blood Test (FOBT)/Fecal Immunochemical Test (FIT)  * Fecal DNA/Cologuard Test  * Flexible Sigmoidoscopy Age: 45-75 years old   Colonoscopy: every 10 years (may be performed more frequently if at higher risk)  OR  FOBT/FIT: every 1 year  OR  Cologuard: every 3 years  OR  Sigmoidoscopy: every 5 years  Screening may be recommended earlier than age 45 if at higher risk for colorectal cancer. Also, an individualized decision between you and your healthcare provider will decide whether screening between the ages of 76-85 would be appropriate. Colonoscopy: Not on file  FOBT/FIT: Not on file  Cologuard: Not on file  Sigmoidoscopy: Not on file          Breast Cancer Screening Age: 40+ years old  Frequency: every 1-2 years  Not required if history of left and right mastectomy Mammogram: 05/23/2017        Cervical Cancer Screening Between the ages of 21-29, pap smear recommended once every 3 years.   Between the ages of 30-65, can perform pap smear with HPV co-testing every 5 years.   Recommendations may differ for women with a history of total hysterectomy, cervical cancer, or abnormal pap smears in past. Pap Smear: Not on file    Screening Not Indicated   Hepatitis C Screening Once for adults born between 1945 and 1965  More frequently in patients at high risk for Hepatitis C  Hep C Antibody: Not on file        Diabetes Screening 1-2 times per year if you're at risk for diabetes or have pre-diabetes Fasting glucose: 85 mg/dL (7/3/2024)  A1C: 6.0 % (7/3/2024)  Screening Current   Cholesterol Screening Once every 5 years if you don't have a lipid disorder. May order more often based on risk factors. Lipid panel: 07/03/2024    Screening Not Indicated  History Lipid Disorder     Other Preventive Screenings Covered by Medicare:  Abdominal Aortic Aneurysm (AAA) Screening: covered once if your at risk. You're considered to be at risk if you have a family history of AAA.  Lung Cancer Screening: covers low dose CT scan once per year if you meet all of the following conditions: (1) Age 55-77; (2) No signs or symptoms of lung cancer; (3) Current smoker or have quit smoking within the last 15 years; (4) You have a tobacco smoking history of at least 20 pack years (packs per day multiplied by number of years you smoked); (5) You get a written order from a healthcare provider.  Glaucoma Screening: covered annually if you're considered high risk: (1) You have diabetes OR (2) Family history of glaucoma OR (3)  aged 50 and older OR (4)  American aged 65 and older  Osteoporosis Screening: covered every 2 years if you meet one of the following conditions: (1) You're estrogen deficient and at risk for osteoporosis based off medical history and other findings; (2) Have a vertebral abnormality; (3) On glucocorticoid therapy for more than 3 months; (4) Have primary hyperparathyroidism; (5) On osteoporosis medications and need to assess response to drug therapy.   Last bone density test (DXA Scan): 04/02/2019.  HIV Screening: covered annually if you're between the age of 15-65. Also covered annually if you are younger than 15 and older than 65 with risk factors for HIV infection. For pregnant patients, it is covered up to 3 times per pregnancy.    Immunizations:  Immunization  Recommendations   Influenza Vaccine Annual influenza vaccination during flu season is recommended for all persons aged >= 6 months who do not have contraindications   Pneumococcal Vaccine   * Pneumococcal conjugate vaccine = PCV13 (Prevnar 13), PCV15 (Vaxneuvance), PCV20 (Prevnar 20)  * Pneumococcal polysaccharide vaccine = PPSV23 (Pneumovax) Adults 19-63 yo with certain risk factors or if 65+ yo  If never received any pneumonia vaccine: recommend Prevnar 20 (PCV20)  Give PCV20 if previously received 1 dose of PCV13 or PPSV23   Hepatitis B Vaccine 3 dose series if at intermediate or high risk (ex: diabetes, end stage renal disease, liver disease)   Respiratory syncytial virus (RSV) Vaccine - COVERED BY MEDICARE PART D  * RSVPreF3 (Arexvy) CDC recommends that adults 60 years of age and older may receive a single dose of RSV vaccine using shared clinical decision-making (SCDM)   Tetanus (Td) Vaccine - COST NOT COVERED BY MEDICARE PART B Following completion of primary series, a booster dose should be given every 10 years to maintain immunity against tetanus. Td may also be given as tetanus wound prophylaxis.   Tdap Vaccine - COST NOT COVERED BY MEDICARE PART B Recommended at least once for all adults. For pregnant patients, recommended with each pregnancy.   Shingles Vaccine (Shingrix) - COST NOT COVERED BY MEDICARE PART B  2 shot series recommended in those 19 years and older who have or will have weakened immune systems or those 50 years and older     Health Maintenance Due:  There are no preventive care reminders to display for this patient.  Immunizations Due:      Topic Date Due   • COVID-19 Vaccine (5 - 2023-24 season) 09/01/2023   • Influenza Vaccine (1) 09/01/2024     Advance Directives   What are advance directives?  Advance directives are legal documents that state your wishes and plans for medical care. These plans are made ahead of time in case you lose your ability to make decisions for yourself. Advance  directives can apply to any medical decision, such as the treatments you want, and if you want to donate organs.   What are the types of advance directives?  There are many types of advance directives, and each state has rules about how to use them. You may choose a combination of any of the following:  Living will:  This is a written record of the treatment you want. You can also choose which treatments you do not want, which to limit, and which to stop at a certain time. This includes surgery, medicine, IV fluid, and tube feedings.   Durable power of  for healthcare (DPAHC):  This is a written record that states who you want to make healthcare choices for you when you are unable to make them for yourself. This person, called a proxy, is usually a family member or a friend. You may choose more than 1 proxy.  Do not resuscitate (DNR) order:  A DNR order is used in case your heart stops beating or you stop breathing. It is a request not to have certain forms of treatment, such as CPR. A DNR order may be included in other types of advance directives.  Medical directive:  This covers the care that you want if you are in a coma, near death, or unable to make decisions for yourself. You can list the treatments you want for each condition. Treatment may include pain medicine, surgery, blood transfusions, dialysis, IV or tube feedings, and a ventilator (breathing machine).  Values history:  This document has questions about your views, beliefs, and how you feel and think about life. This information can help others choose the care that you would choose.  Why are advance directives important?  An advance directive helps you control your care. Although spoken wishes may be used, it is better to have your wishes written down. Spoken wishes can be misunderstood, or not followed. Treatments may be given even if you do not want them. An advance directive may make it easier for your family to make difficult choices about  your care.   Fall Prevention    Fall prevention  includes ways to make your home and other areas safer. It also includes ways you can move more carefully to prevent a fall. Health conditions that cause changes in your blood pressure, vision, or muscle strength and coordination may increase your risk for falls. Medicines may also increase your risk for falls if they make you dizzy, weak, or sleepy.   Fall prevention tips:   Stand or sit up slowly.    Use assistive devices as directed.    Wear shoes that fit well and have soles that .    Wear a personal alarm.    Stay active.    Manage your medical conditions.    Home Safety Tips:  Add items to prevent falls in the bathroom.    Keep paths clear.    Install bright lights in your home.    Keep items you use often on shelves within reach.    Paint or place reflective tape on the edges of your stairs.    Weight Management   Why it is important to manage your weight:  Being overweight increases your risk of health conditions such as heart disease, high blood pressure, type 2 diabetes, and certain types of cancer. It can also increase your risk for osteoarthritis, sleep apnea, and other respiratory problems. Aim for a slow, steady weight loss. Even a small amount of weight loss can lower your risk of health problems.  How to lose weight safely:  A safe and healthy way to lose weight is to eat fewer calories and get regular exercise. You can lose up about 1 pound a week by decreasing the number of calories you eat by 500 calories each day.   Healthy meal plan for weight management:  A healthy meal plan includes a variety of foods, contains fewer calories, and helps you stay healthy. A healthy meal plan includes the following:  Eat whole-grain foods more often.  A healthy meal plan should contain fiber. Fiber is the part of grains, fruits, and vegetables that is not broken down by your body. Whole-grain foods are healthy and provide extra fiber in your diet. Some examples  of whole-grain foods are whole-wheat breads and pastas, oatmeal, brown rice, and bulgur.  Eat a variety of vegetables every day.  Include dark, leafy greens such as spinach, kale, elijah greens, and mustard greens. Eat yellow and orange vegetables such as carrots, sweet potatoes, and winter squash.   Eat a variety of fruits every day.  Choose fresh or canned fruit (canned in its own juice or light syrup) instead of juice. Fruit juice has very little or no fiber.  Eat low-fat dairy foods.  Drink fat-free (skim) milk or 1% milk. Eat fat-free yogurt and low-fat cottage cheese. Try low-fat cheeses such as mozzarella and other reduced-fat cheeses.  Choose meat and other protein foods that are low in fat.  Choose beans or other legumes such as split peas or lentils. Choose fish, skinless poultry (chicken or turkey), or lean cuts of red meat (beef or pork). Before you cook meat or poultry, cut off any visible fat.   Use less fat and oil.  Try baking foods instead of frying them. Add less fat, such as margarine, sour cream, regular salad dressing and mayonnaise to foods. Eat fewer high-fat foods. Some examples of high-fat foods include french fries, doughnuts, ice cream, and cakes.  Eat fewer sweets.  Limit foods and drinks that are high in sugar. This includes candy, cookies, regular soda, and sweetened drinks.  Exercise:  Exercise at least 30 minutes per day on most days of the week. Some examples of exercise include walking, biking, dancing, and swimming. You can also fit in more physical activity by taking the stairs instead of the elevator or parking farther away from stores. Ask your healthcare provider about the best exercise plan for you.      © Copyright [employer] 2018 Information is for End User's use only and may not be sold, redistributed or otherwise used for commercial purposes. All illustrations and images included in CareNotes® are the copyrighted property of [employer] or [employer]

## 2024-07-08 NOTE — ASSESSMENT & PLAN NOTE
Patient states that she is not depressed anymore.  Prefers not to be on any medication at this point

## 2024-07-11 ENCOUNTER — OFFICE VISIT (OUTPATIENT)
Age: 84
End: 2024-07-11
Payer: MEDICARE

## 2024-07-11 VITALS
BODY MASS INDEX: 29.64 KG/M2 | WEIGHT: 151 LBS | HEIGHT: 60 IN | HEART RATE: 79 BPM | RESPIRATION RATE: 17 BRPM | DIASTOLIC BLOOD PRESSURE: 70 MMHG | SYSTOLIC BLOOD PRESSURE: 123 MMHG

## 2024-07-11 DIAGNOSIS — M77.41 METATARSALGIA OF BOTH FEET: Primary | ICD-10-CM

## 2024-07-11 DIAGNOSIS — M77.42 METATARSALGIA OF BOTH FEET: Primary | ICD-10-CM

## 2024-07-11 DIAGNOSIS — B35.9 DERMATOPHYTOSIS: ICD-10-CM

## 2024-07-11 DIAGNOSIS — M54.16 RADICULOPATHY OF LUMBAR REGION: ICD-10-CM

## 2024-07-11 DIAGNOSIS — I70.209 PERIPHERAL ARTERIOSCLEROSIS (HCC): ICD-10-CM

## 2024-07-11 DIAGNOSIS — B35.1 ONYCHOMYCOSIS: ICD-10-CM

## 2024-07-11 PROCEDURE — 99213 OFFICE O/P EST LOW 20 MIN: CPT | Performed by: PODIATRIST

## 2024-07-29 NOTE — PROGRESS NOTES
Progress Note - Cardiology Office  [facility]    Rosalind Wise 83 y.o. female MRN: [id number]  : 1940  Encounter: [id number]      Assessment:     Persistent atrial fibrillation.  Chronic diastolic heart failure.  Valvular heart disease.  Essential hypertension.  Pulmonary hypertension.   Dyslipidemia.  CKD stage III.  Hypothyroidism.  Intracranial hemorrhage.  Pre-diabetes.   Obstructive sleep apnea.      Discussion Summary and Plan:    Persistent atrial fibrillation.  - Rate control strategy.  - Continue Lopressor 12.5 mg twice daily.  - Continue Eliquis 2.5 mg twice daily.  - [id number] stroke risk score: 7 points, moderate to high risk.    Chronic diastolic heart failure.  - Does not appear in acute exacerbation.  Patient appears euvolemic on physical exam.  - 24 TTE: LVEF 55%.  Diastolic function is abnormal.  Bilateral atrium severely dilated.  Aortic Valve: The aortic valve is functionally bicuspid. The leaflets are mildly thickened. The leaflets are mildly calcified. There is mildly reduced mobility. There is moderate regurgitation. There is mild stenosis. The aortic valve peak gradient is 11 mmHg. The aortic valve mean gradient is 6 mmHg. Mitral Valve: There is mild thickening. There is moderate calcification. There is moderately reduced mobility of the posterior leaflet. There is severe annular calcification. There is moderate to severe regurgitation. There is no evidence of stenosis. Tricuspid Valve: There is moderate regurgitation. The right ventricular systolic pressure is severely elevated. The estimated right ventricular systolic pressure is 95.00 mmHg.  - Currently on Lopressor 12.5 mg twice daily, Aldactone 12.5 mg daily, losartan 50 mg in the morning and losartan 25 mg in the evening, and Bumex 1 mg daily. Continue at this time.  - CHF education.  - Discussed with patient the importance of daily weight check.  Asked that she please contact her office if  there is a 3 lbs weight gain in 1 day or 5 lbs weight gain in 1 week.    Valvular heart disease.  - Moderate aortic valve regurgitation.  Mild aortic valve stenosis.  Moderate to severe mitral valve regurgitation.  Moderate tricuspid valve regurgitation.  - 4/29/24 TTE:   Aortic Valve The aortic valve is functionally bicuspid. The leaflets are mildly thickened. The leaflets are mildly calcified. There is mildly reduced mobility. There is moderate regurgitation. There is mild stenosis. The aortic valve peak gradient is 11 mmHg. The aortic valve mean gradient is 6 mmHg.   Mitral Valve There is mild thickening. There is moderate calcification. There is moderately reduced mobility of the posterior leaflet. There is severe annular calcification.  There is moderate to severe regurgitation. There is no evidence of stenosis.   Tricuspid Valve There is mild thickening. There is mild calcification. There is moderate regurgitation. There is no evidence of stenosis. The right ventricular systolic pressure is severely elevated. The estimated right ventricular systolic pressure is 95.00 mmHg.   Pulmonic Valve There is mild thickening. There is trace regurgitation. There is no evidence of stenosis.   -10/02/23 TTE:   Aortic Valve The aortic valve is functionally bicuspid. The leaflets are mildly thickened. The leaflets are mildly calcified. There is mildly reduced mobility. There is moderate regurgitation. There is mild stenosis.   Mitral Valve There is mild thickening. There is moderate calcification. There is moderately reduced mobility of the posterior leaflet. There is severe annular calcification. Eccentric jet. Difficult to interrogate mitral regurgitation. There is at least moderate regurgitation. Unable to assess mitral valve stenosis due to poor Doppler exam.   Tricuspid Valve There is mild thickening. There is mild calcification. There is moderate regurgitation. There is no evidence of stenosis. The right ventricular  systolic pressure is moderately elevated.   Pulmonic Valve There is mild thickening. There is trace regurgitation. There is no evidence of stenosis.     Essential hypertension.  - BP during today's office visit, 124/70.   - Currently on Lopressor 12.5 mg twice daily, Aldactone 12.5 mg daily, losartan 50 mg in the morning and losartan 25 mg in the evening, and Bumex 1 mg daily.  - 7/03/24 CMP reviewed.     Pulmonary hypertension.  - 4/29/24 TTE: The right ventricular systolic pressure is severely elevated. The estimated right ventricular systolic pressure is 95.00 mmHg.   - 10/02/23 TTE: The right ventricular systolic pressure is moderately elevated.     Dyslipidemia.  - Currently on Lipitor 40 mg daily.  - 7/03/24 lipid panel: Cholesterol 102, triglycerides 89, HDL 49, LDL 35.    CKD stage III.  - Baseline creatinine appears between 1.1 and 1.3.  - Follows outpatient with [facility] nephrology.    Hypothyroidism.  - Care per PCP.     Intracranial hemorrhage.  - Documented 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 in February 2020.    Pre-diabetes.   - 7/03/24 HgbA1c: 6.0.  - Care per PCP.     Obstructive sleep apnea.  - Patient reports compliance with CPAP.      Patient / Caretaker was advised and educated to call our office  immediately if  patient has any new symptoms of chest pain/shortness of breath, near-syncope, syncope, light headedness sustained palpitations  or any other cardiovascular symptoms before their scheduled follow-up appointment.  Office number was provided #[phone].  Please call [phone] if any questions.  Counseling :  A description of the counseling.  Goals and Barriers.  Patient's ability to self care: Yes  Medication side effect reviewed with patient in detail and all their questions answered to their satisfaction.    HPI :     Rosalind Wise is a 83 y.o. female with PMHx of persistent atrial fibrillation (on Eliquis), chronic diastolic  heart failure, valvular heart disease,, essential hypertension, pulmonary hypertension, dyslipidemia, CKDIII, hypothyroidism, pre-diabetes, intracranial hemorrhage (2020), WENDI (on CPAP), who presents for routine outpatient cardiology follow-up.     Patient was last seen outpatient cardiology office on 10/27/23.  Patient states she is doing well, she offers no complaints.  She denies experiencing chest pain, palpitations, shortness of breath at rest or with exertion, lower extreme edema, orthopnea, weight gain, lightheadedness, dizziness, headache, nausea, vomiting.    Review of Systems   All other systems reviewed and are negative.      Historical Information   Past Medical History:   Diagnosis Date    Anxiety     Arthritis     joints    Bunion     Cataract     Disease of thyroid gland     Eczema     GERD (gastroesophageal reflux disease)     Gout     Heart failure (HCC)     Hepatitis     Hiatal hernia     Hypertension     Onychomycosis     last assessed: 16    Orthopnea     resolved: 16    Pseudogout of left wrist     Sleep apnea     wears CPAP    Stroke (HCC)      Past Surgical History:   Procedure Laterality Date    ABDOMINAL SURGERY          BRAIN HEMATOMA EVACUATION Right 2020    Procedure: Right decompressive craniectomy and evacuation of intraparenchymal hematoma;  Surgeon: Apolinar Herrera MD;  Location:  MAIN OR;  Service: Neurosurgery    BREAST SURGERY Right     cyst removal    CARPAL TUNNEL RELEASE Left     CARPAL TUNNEL RELEASE Right     CATARACT EXTRACTION       SECTION  1960    x1    COLONOSCOPY N/A 2018    Procedure: COLONOSCOPY;  Surgeon: Alejandro Carlson MD;  Location: [facility] GI LAB;  Service: Gastroenterology    DILATION AND CURETTAGE OF UTERUS  1967    EYE SURGERY Left 2006    cataracts    HERNIA REPAIR      umbilical hernia    INCISIONAL HERNIA REPAIR      incarcerated    KNEE ARTHROSCOPY Right     WV RPLCMT BONE FLAP/PROSTHETIC PLATE SKULL Right  4/6/2020    Procedure: right frontotemporal replacement cranioplasty;  Surgeon: Apolinar Herrera MD;  Location:  MAIN OR;  Service: Neurosurgery    ID XCAPSL CTRC RMVL INSJ IO LENS PROSTH W/O ECP Right 3/27/2017    Procedure: EXTRACTION EXTRACAPSULAR CATARACT PHACO INTRAOCULAR LENS (IOL);  Surgeon: Trip Gilbert MD;  Location: [facility] MAIN OR;  Service: Ophthalmology     Social History     Substance and Sexual Activity   Alcohol Use Yes    Comment: Only on special occasions     Social History     Substance and Sexual Activity   Drug Use Never     Social History     Tobacco Use   Smoking Status Never   Smokeless Tobacco Never     Family History:   Family History   Problem Relation Age of Onset    Diabetes Mother     Coronary artery disease Mother     Arthritis Mother     Hypertension Mother     Hypertension Father     Diabetes Brother     Diabetes Son     Arthritis Family        Meds/Allergies     No Known Allergies    Current Outpatient Medications:     apixaban (Eliquis) 2.5 mg, Take 1 tablet (2.5 mg total) by mouth 2 (two) times a day, Disp: 180 tablet, Rfl: 3    atorvastatin (LIPITOR) 40 mg tablet, TAKE 1 TABLET EVERY EVENING, Disp: 90 tablet, Rfl: 1    bumetanide (BUMEX) 1 mg tablet, Take 1 tablet (1 mg total) by mouth daily, Disp: 90 tablet, Rfl: 3    Cholecalciferol (Vitamin D3) 50 MCG CAPS, Take by mouth in the morning, Disp: , Rfl:     Diclofenac Sodium (VOLTAREN) 1 %, Apply 2 g topically in the morning APPLY NO MORE THAN 3 DAYS IN A ROW THEN TAKE A BREAK FOR ONE WEEK FROM USE., Disp: 100 g, Rfl: 1    hydrOXYzine HCL (ATARAX) 10 mg tablet, Take 1 tablet (10 mg total) by mouth daily at bedtime as needed (insomnia), Disp: 90 tablet, Rfl: 1    levothyroxine 88 mcg tablet, Take 1 tablet (88 mcg total) by mouth daily, Disp: 90 tablet, Rfl: 1    losartan (COZAAR) 25 mg tablet, TAKE 2 TABLETS IN THE MORNING AND TAKE 1 TABLET IN THE EVENING, Disp: 270 tablet, Rfl: 10    Magnesium 400 MG CAPS, Take by mouth in the  morning Take 500 mg. BID, Disp: , Rfl:     metoprolol tartrate (LOPRESSOR) 25 mg tablet, TAKE 1/2 TABLET EVERY 12 HOURS, Disp: 90 tablet, Rfl: 1    pantoprazole (PROTONIX) 20 mg tablet, Take 1 tablet (20 mg total) by mouth daily, Disp: 90 tablet, Rfl: 1    spironolactone (ALDACTONE) 25 mg tablet, Take 0.5 tablets (12.5 mg total) by mouth daily, Disp: 45 tablet, Rfl: 3    Vitals: Blood pressure 124/70, pulse 95, height 5' (1.524 m), weight 68.5 kg (151 lb), SpO2 99%, not currently breastfeeding.    Body mass index is 29.49 kg/m².  Wt Readings from Last 3 Encounters:   24 68.5 kg (151 lb)   24 68.5 kg (151 lb)   24 68.5 kg (151 lb)     Vitals:    24 1013   Weight: 68.5 kg (151 lb)     BP Readings from Last 3 Encounters:   24 124/70   24 123/70   24 118/72       Physical Exam:  Physical Exam  Vitals reviewed.   Constitutional:       General: She is not in acute distress.  Cardiovascular:      Rate and Rhythm: Normal rate. Rhythm irregular.      Pulses: Normal pulses.      Heart sounds: Murmur heard.   Pulmonary:      Effort: Pulmonary effort is normal. No respiratory distress.      Breath sounds: Normal breath sounds.   Abdominal:      General: Abdomen is flat. There is no distension.      Palpations: Abdomen is soft.      Tenderness: There is no abdominal tenderness.   Musculoskeletal:      Right lower leg: No edema.      Left lower leg: No edema.   Skin:     General: Skin is warm and dry.   Neurological:      Mental Status: She is alert and oriented to person, place, and time.       Diagnostic Studies Review Cardio:      EK/30/24 EKG: Atrial fibrillation, ventricular rate 95 bpm.  Noted artifact.  Cardiac testing:     Echo complete with contrast if indicated  Result date: 24     Left Ventricle Left ventricular cavity size is normal. Wall thickness is mildly increased. The left ventricular ejection fraction is 55% by visual estimation. Systolic function is normal.  Wall motion is normal. Diastolic function is abnormal.  Left atrial filling pressure is elevated.   Right Ventricle Right ventricular cavity size is normal. Systolic function is mildly reduced. Abnormal tricuspid annular plane systolic excursion (TAPSE) < 1.7 cm.   Left Atrium The atrium is severely dilated (>48 mL/m2).   Right Atrium The atrium is severely dilated.   Aortic Valve The aortic valve is functionally bicuspid. The leaflets are mildly thickened. The leaflets are mildly calcified. There is mildly reduced mobility. There is moderate regurgitation. There is mild stenosis. The aortic valve peak gradient is 11 mmHg. The aortic valve mean gradient is 6 mmHg.   Mitral Valve There is mild thickening. There is moderate calcification. There is moderately reduced mobility of the posterior leaflet. There is severe annular calcification.  There is moderate to severe regurgitation. There is no evidence of stenosis.   Tricuspid Valve There is mild thickening. There is mild calcification. There is moderate regurgitation. There is no evidence of stenosis. The right ventricular systolic pressure is severely elevated. The estimated right ventricular systolic pressure is 95.00 mmHg.   Pulmonic Valve There is mild thickening. There is trace regurgitation. There is no evidence of stenosis.   IVC/SVC The right atrial pressure is estimated at 8.0 mmHg. The inferior vena cava is normal in size.       Lab Review   Lab Results   Component Value Date    WBC 6.47 07/03/2024    HGB 11.9 07/03/2024    HCT 38.0 07/03/2024    MCV 91 07/03/2024    RDW 14.6 07/03/2024     07/03/2024     BMP:  Lab Results   Component Value Date    SODIUM 143 07/03/2024    K 4.7 07/03/2024     07/03/2024    CO2 30 07/03/2024    BUN 31 (H) 07/03/2024    CREATININE 1.09 07/03/2024    GLUC 115 03/27/2024    GLUF 85 07/03/2024    CALCIUM 9.1 07/03/2024    CORRECTEDCA 10.1 06/29/2023    EGFR 46 07/03/2024    MG 2.2 07/03/2024     LFT:  Lab Results  "  Component Value Date    AST 14 07/03/2024    ALT 10 07/03/2024    ALKPHOS 89 07/03/2024    TP 6.4 07/03/2024    ALB 3.8 07/03/2024      No components found for: \"TSH3\"  Lab Results   Component Value Date    URQ0YDDUNKRF 1.377 07/03/2024     Lab Results   Component Value Date    HGBA1C 6.0 (H) 07/03/2024     Lipid Profile:   Lab Results   Component Value Date    CHOLESTEROL 102 07/03/2024    HDL 49 (L) 07/03/2024    LDLCALC 35 07/03/2024    TRIG 89 07/03/2024        Keila Dhillon PA-C  "

## 2024-07-30 ENCOUNTER — OFFICE VISIT (OUTPATIENT)
Dept: CARDIOLOGY CLINIC | Facility: CLINIC | Age: 84
End: 2024-07-30
Payer: MEDICARE

## 2024-07-30 VITALS
BODY MASS INDEX: 29.64 KG/M2 | SYSTOLIC BLOOD PRESSURE: 124 MMHG | DIASTOLIC BLOOD PRESSURE: 70 MMHG | WEIGHT: 151 LBS | OXYGEN SATURATION: 99 % | HEART RATE: 95 BPM | HEIGHT: 60 IN

## 2024-07-30 DIAGNOSIS — I48.0 PAROXYSMAL ATRIAL FIBRILLATION (HCC): Primary | ICD-10-CM

## 2024-07-30 DIAGNOSIS — I34.0 MODERATE TO SEVERE MITRAL REGURGITATION: ICD-10-CM

## 2024-07-30 DIAGNOSIS — I48.91 ATRIAL FIBRILLATION WITH RVR (HCC): ICD-10-CM

## 2024-07-30 DIAGNOSIS — I49.1 PAC (PREMATURE ATRIAL CONTRACTION): ICD-10-CM

## 2024-07-30 DIAGNOSIS — I50.32 CHRONIC DIASTOLIC (CONGESTIVE) HEART FAILURE (HCC): ICD-10-CM

## 2024-07-30 DIAGNOSIS — I10 BENIGN ESSENTIAL HYPERTENSION: ICD-10-CM

## 2024-07-30 PROCEDURE — 99214 OFFICE O/P EST MOD 30 MIN: CPT | Performed by: PHYSICIAN ASSISTANT

## 2024-07-30 PROCEDURE — 93000 ELECTROCARDIOGRAM COMPLETE: CPT | Performed by: PHYSICIAN ASSISTANT

## 2024-08-07 PROBLEM — Z00.00 MEDICARE ANNUAL WELLNESS VISIT, SUBSEQUENT: Status: RESOLVED | Noted: 2019-03-08 | Resolved: 2024-08-07

## 2024-08-29 ENCOUNTER — TELEPHONE (OUTPATIENT)
Age: 84
End: 2024-08-29

## 2024-10-03 ENCOUNTER — OFFICE VISIT (OUTPATIENT)
Age: 84
End: 2024-10-03
Payer: MEDICARE

## 2024-10-03 VITALS
HEIGHT: 60 IN | RESPIRATION RATE: 16 BRPM | WEIGHT: 151 LBS | BODY MASS INDEX: 29.64 KG/M2 | HEART RATE: 144 BPM | SYSTOLIC BLOOD PRESSURE: 142 MMHG | DIASTOLIC BLOOD PRESSURE: 79 MMHG

## 2024-10-03 DIAGNOSIS — M79.671 RIGHT FOOT PAIN: ICD-10-CM

## 2024-10-03 DIAGNOSIS — M72.2 PLANTAR FASCIITIS: ICD-10-CM

## 2024-10-03 DIAGNOSIS — M54.16 RADICULOPATHY OF LUMBAR REGION: ICD-10-CM

## 2024-10-03 DIAGNOSIS — B35.9 DERMATOPHYTOSIS: ICD-10-CM

## 2024-10-03 DIAGNOSIS — I70.209 PERIPHERAL ARTERIOSCLEROSIS (HCC): ICD-10-CM

## 2024-10-03 DIAGNOSIS — M77.42 METATARSALGIA OF BOTH FEET: Primary | ICD-10-CM

## 2024-10-03 DIAGNOSIS — M77.41 METATARSALGIA OF BOTH FEET: Primary | ICD-10-CM

## 2024-10-03 DIAGNOSIS — B35.1 ONYCHOMYCOSIS: ICD-10-CM

## 2024-10-03 PROCEDURE — 20550 NJX 1 TENDON SHEATH/LIGAMENT: CPT | Performed by: PODIATRIST

## 2024-10-03 PROCEDURE — 99213 OFFICE O/P EST LOW 20 MIN: CPT | Performed by: PODIATRIST

## 2024-10-03 RX ORDER — TRIAMCINOLONE ACETONIDE 40 MG/ML
20 INJECTION, SUSPENSION INTRA-ARTICULAR; INTRAMUSCULAR
Status: SHIPPED | OUTPATIENT
Start: 2024-10-03

## 2024-10-03 RX ADMIN — TRIAMCINOLONE ACETONIDE 20 MG: 40 INJECTION, SUSPENSION INTRA-ARTICULAR; INTRAMUSCULAR at 10:45

## 2024-10-03 NOTE — PROGRESS NOTES
Assessment/Plan: Metatarsalgia secondary to radiculopathy.  Pain upon ambulation.  Mycosis of nail.  Dermatophytosis.  Patient with peripheral artery disease.  Plantar fasciitis right foot.     Plan.  Chart reviewed.  PCP notes reviewed.  Lab work reviewed . patient examined.  Patient advised on condition.  At this time patient remain on Cymbalta as directed.  In addition she will use over-the-counter antifungal.  For xerosis, patient will use moisturizer after bathing.  Shoe size evaluated.  Shoe style recommended.  Aftercare instruction given.  Return for follow-up.    In order to help treat right heel pain, trigger point junction done right heel.    Foot/lower extremity injection    Performed by: Peter Sheets DPM  Authorized by: Peter Sheets DPM    Procedure:     Other Assisting Provider: No      Verbal consent obtained?: Yes      Risks and benefits: Risks, benefits and alternatives were discussed      Consent given by:  Patient    Time out: Immediately prior to the procedure a time out was called      Time out performed at:  10/3/2024 10:51 AM    Patient states understanding of procedure being performed: Yes      Patient identity confirmed:  Verbally with patient    Supporting Documentation:     Indications:  Pain and therapeutic    Procedure Details:    Prep: patient was prepped and draped in usual sterile fashion                Ethyl Chloride was applied      Needle size: 25 G G    Ultrasound Guidance: no      Approach:  Medial    Laterality:  Right    Cyst Aspiration/Injection: No      Location: aponeurosis      Aponeurosis Structures: Plantar fascia origin      Injection Information:       Medications:  20 mg triamcinolone acetonide 40 mg/mL    Patient tolerance:  Patient tolerated the procedure well with no immediate complications    Dressing: sterile dressing applied                  Diagnoses and all orders for this visit:     Metatarsalgia of both feet     Radiculopathy of lumbar region      Dermatophytosis     Onychomycosis     Peripheral arteriosclerosis    Plantar fasciitis right foot.            Subjective: Patient has pain in her feet.  She has low back pain.  She gets some relief from Cymbalta.  She also complains of ingrown toenails.  No history of trauma       Patient recently has had right heel pain.  She has pain upon rising.  No history of trauma.      No Known Allergies        Current Outpatient Medications:     amLODIPine (NORVASC) 10 mg tablet, Take 0.5 tablets (5 mg total) by mouth daily, Disp: 90 tablet, Rfl: 0    apixaban (Eliquis) 2.5 mg, Take 1 tablet (2.5 mg total) by mouth 2 (two) times a day, Disp: 180 tablet, Rfl: 3    atorvastatin (LIPITOR) 40 mg tablet, Take 1 tablet (40 mg total) by mouth every evening, Disp: 90 tablet, Rfl: 1    bumetanide (BUMEX) 2 mg tablet, Take 0.5 tablets (1 mg total) by mouth daily, Disp: , Rfl:     Diclofenac Sodium (VOLTAREN) 1 %, Apply 2 g topically in the morning APPLY NO MORE THAN 3 DAYS IN A ROW THEN TAKE A BREAK FOR ONE WEEK FROM USE., Disp: 100 g, Rfl: 1    levothyroxine 88 mcg tablet, TAKE 1 TABLET EVERY DAY, Disp: 90 tablet, Rfl: 1    losartan (COZAAR) 25 mg tablet, TAKE 2 TABLETS IN THE MORNING AND TAKE 1 TABLET IN THE EVENING, Disp: 270 tablet, Rfl: 10    metoprolol tartrate (LOPRESSOR) 25 mg tablet, Take 0.5 tablets (12.5 mg total) by mouth every 12 (twelve) hours, Disp: 45 tablet, Rfl: 3    pantoprazole (PROTONIX) 20 mg tablet, TAKE 1 TABLET EVERY DAY, Disp: 90 tablet, Rfl: 1    spironolactone (ALDACTONE) 25 mg tablet, Take 0.5 tablets (12.5 mg total) by mouth daily, Disp: 45 tablet, Rfl: 3    gabapentin (NEURONTIN) 100 mg capsule, Take 1 tablet at bedtime.  May increase to 2 tablets after 3 days (Patient not taking: Reported on 12/15/2023), Disp: 90 capsule, Rfl: 1    ketoconazole (NIZORAL) 2 % cream, Apply topically daily, Disp: 60 g, Rfl: 1    senna-docusate sodium (SENOKOT S) 8.6-50 mg per tablet, Take 1 tablet by mouth daily (Patient  not taking: Reported on 12/18/2023), Disp: 20 tablet, Rfl: 1           Patient Active Problem List   Diagnosis    Benign essential hypertension    Chronic diastolic (congestive) heart failure (HCC)    Hypothyroidism    Arthropathy of knee    Hallux abductovalgus with bunions, unspecified laterality    CKD (chronic kidney disease), stage III (HCC)    Diverticulitis of colon    Chronic gout without tophus    Hiatal hernia    Hyperlipemia    Hyperuricemia    Nephrolithiasis    WENDI (obstructive sleep apnea)    Pseudogout of left wrist    Gastroesophageal reflux disease without esophagitis    Exertional shortness of breath    Prediabetes    Status post right frontotemporal replacement cranioplasty    BMI 38.0-38.9,adult    Sciatica of left side    Spinal stenosis of lumbar region with neurogenic claudication    Cervical radiculopathy    Paroxysmal atrial fibrillation (HCC)    PAC (premature atrial contraction)             Patient ID: Rosalind Wise is a 83 y.o. female.     HPI     The following portions of the patient's history were reviewed and updated as appropriate:      family history includes Arthritis in her family and mother; Coronary artery disease in her mother; Diabetes in her brother, mother, and son; Hypertension in her father and mother.       reports that she has never smoked. She has never used smokeless tobacco. She reports current alcohol use. She reports that she does not use drugs.      Objective:  Patient's shoes and socks removed.   Foot ExamPhysical Exam       Physical Exam   Left Foot: Appearance: Normal except as noted: excessive pronation-- and-- pes planus.    Right Foot: Appearance: Normal except as noted: excessive pronation-- and-- pes planus. Tenderness: None except the calcaneous,-- medial calcaneous-- and-- insertion of the plantar fascia.  Patient has an enlarged hallux valgus deformity with inflamed bunion.  Left Ankle: Appearance: Normal except ecchymosis-- and-- swelling laterally.  ROM: limited ROM in all planes    Right Ankle: ROM: limited ROM in all planes Motor: diffuse weakness.  Pain with palpation anterior lateral aspect right ankle joint mortise.    Neurological Exam: performed. Light touch was intact bilaterally. Vibratory sensation was intact bilaterally. Response to monofilament test was intact bilaterally. Deep tendon reflexes: patellar reflex present bilaterally-- and-- achilles reflex present bilaterally.    Vascular Exam: performed Dorsalis pedis pulses were 1/4 bilaterally. Posterior tibial pulses were 1/4 bilaterally. Elevation Pallor: diminished bilaterally. Capillary refill time was greater than 3 seconds bilaterally-- and-- Q9 findings bilateral. Negative digital hair noted. Positive abnormal cooling bilateral. Edema: moderate bilaterally-- and-- 6/7 pitting edema. Negative Homans sign.    Toenails: All of the toenails were elongated,-- hypertrophied,-- discolored-- and-- Mycotic with onychogryphosis. Note is made of bilateral tinea pedis in moccasin foot. Distribution.         Socks and shoes removed, Right Foot Findings: swollen, erythematous and dry.      The sensory exam showed diminished vibratory sensation at the level of the toes. Diminished tactile sensation with monofilament testing throughout the right foot.      Socks and shoes removed, Left Foot Findings: swollen, erythematous and dry.      The sensory exam showed diminished vibratory sensation at the level of the toes. Diminished tactile sensation with monofilament testing throughout the left foot.     Capillary refills findings on the right were delayed in the toes.      Pulses:      1+ in the posterior tibialis on the right      1+ in the dorsalis pedis on the right.      Capillary refills findings on the left were delayed in the toes.      Pulses:      1+ in the posterior tibialis on the left      1+ in the dorsalis pedis on the left.      Assign Risk Category: 2: Loss of protective sensation with or without  weakness, deformity, callus, pre-ulcer, or history of ulceration. High risk.    Hyperkeratosis: present on both first toes,-- present on both first sub metatarsals-- and-- Bilateral plantar moccasin tinea pedis noted.    Shoe Gear Evaluation: performed (). Recommendation(s): SAS style.

## 2024-10-08 ENCOUNTER — HOSPITAL ENCOUNTER (EMERGENCY)
Facility: HOSPITAL | Age: 84
Discharge: HOME | End: 2024-10-08
Attending: EMERGENCY MEDICINE
Payer: MEDICARE

## 2024-10-08 ENCOUNTER — APPOINTMENT (EMERGENCY)
Dept: CT IMAGING | Facility: HOSPITAL | Age: 84
End: 2024-10-08
Payer: MEDICARE

## 2024-10-08 ENCOUNTER — APPOINTMENT (EMERGENCY)
Dept: RADIOLOGY | Facility: HOSPITAL | Age: 84
End: 2024-10-08
Payer: MEDICARE

## 2024-10-08 VITALS
RESPIRATION RATE: 16 BRPM | DIASTOLIC BLOOD PRESSURE: 65 MMHG | TEMPERATURE: 98 F | HEART RATE: 98 BPM | OXYGEN SATURATION: 98 % | SYSTOLIC BLOOD PRESSURE: 142 MMHG

## 2024-10-08 DIAGNOSIS — F32.A DEPRESSION: ICD-10-CM

## 2024-10-08 DIAGNOSIS — R51.9 HEADACHE: Primary | ICD-10-CM

## 2024-10-08 LAB
2HR DELTA HS TROPONIN: -3 NG/L
ALBUMIN SERPL BCG-MCNC: 4.1 G/DL (ref 3.5–5)
ALP SERPL-CCNC: 101 U/L (ref 34–104)
ALT SERPL W P-5'-P-CCNC: 13 U/L (ref 7–52)
AMPHETAMINES SERPL QL SCN: NEGATIVE
ANION GAP SERPL CALCULATED.3IONS-SCNC: 7 MMOL/L (ref 4–13)
AST SERPL W P-5'-P-CCNC: 16 U/L (ref 13–39)
ATRIAL RATE: 326 BPM
BARBITURATES UR QL: NEGATIVE
BASOPHILS # BLD AUTO: 0.03 THOUSANDS/ΜL (ref 0–0.1)
BASOPHILS NFR BLD AUTO: 1 % (ref 0–1)
BENZODIAZ UR QL: NEGATIVE
BILIRUB SERPL-MCNC: 0.91 MG/DL (ref 0.2–1)
BILIRUB UR QL STRIP: NEGATIVE
BUN SERPL-MCNC: 29 MG/DL (ref 5–25)
CALCIUM SERPL-MCNC: 9.9 MG/DL (ref 8.4–10.2)
CARDIAC TROPONIN I PNL SERPL HS: 27 NG/L
CARDIAC TROPONIN I PNL SERPL HS: 30 NG/L
CHLORIDE SERPL-SCNC: 103 MMOL/L (ref 96–108)
CLARITY UR: CLEAR
CO2 SERPL-SCNC: 32 MMOL/L (ref 21–32)
COCAINE UR QL: NEGATIVE
COLOR UR: COLORLESS
CREAT SERPL-MCNC: 1.34 MG/DL (ref 0.6–1.3)
EOSINOPHIL # BLD AUTO: 0.04 THOUSAND/ΜL (ref 0–0.61)
EOSINOPHIL NFR BLD AUTO: 1 % (ref 0–6)
ERYTHROCYTE [DISTWIDTH] IN BLOOD BY AUTOMATED COUNT: 14.9 % (ref 11.6–15.1)
ETHANOL EXG-MCNC: NORMAL MG/DL
FENTANYL UR QL SCN: NEGATIVE
GFR SERPL CREATININE-BSD FRML MDRD: 36 ML/MIN/1.73SQ M
GLUCOSE SERPL-MCNC: 89 MG/DL (ref 65–140)
GLUCOSE UR STRIP-MCNC: NEGATIVE MG/DL
HCT VFR BLD AUTO: 39.3 % (ref 34.8–46.1)
HGB BLD-MCNC: 12.6 G/DL (ref 11.5–15.4)
HGB UR QL STRIP.AUTO: NEGATIVE
HYDROCODONE UR QL SCN: NEGATIVE
IMM GRANULOCYTES # BLD AUTO: 0.01 THOUSAND/UL (ref 0–0.2)
IMM GRANULOCYTES NFR BLD AUTO: 0 % (ref 0–2)
KETONES UR STRIP-MCNC: NEGATIVE MG/DL
LEUKOCYTE ESTERASE UR QL STRIP: NEGATIVE
LIPASE SERPL-CCNC: 19 U/L (ref 11–82)
LYMPHOCYTES # BLD AUTO: 0.84 THOUSANDS/ΜL (ref 0.6–4.47)
LYMPHOCYTES NFR BLD AUTO: 13 % (ref 14–44)
MCH RBC QN AUTO: 28.3 PG (ref 26.8–34.3)
MCHC RBC AUTO-ENTMCNC: 32.1 G/DL (ref 31.4–37.4)
MCV RBC AUTO: 88 FL (ref 82–98)
METHADONE UR QL: NEGATIVE
MONOCYTES # BLD AUTO: 0.37 THOUSAND/ΜL (ref 0.17–1.22)
MONOCYTES NFR BLD AUTO: 6 % (ref 4–12)
NEUTROPHILS # BLD AUTO: 5.31 THOUSANDS/ΜL (ref 1.85–7.62)
NEUTS SEG NFR BLD AUTO: 79 % (ref 43–75)
NITRITE UR QL STRIP: NEGATIVE
NRBC BLD AUTO-RTO: 0 /100 WBCS
OPIATES UR QL SCN: NEGATIVE
OXYCODONE+OXYMORPHONE UR QL SCN: NEGATIVE
PCP UR QL: NEGATIVE
PH UR STRIP.AUTO: 7.5 [PH]
PLATELET # BLD AUTO: 141 THOUSANDS/UL (ref 149–390)
PMV BLD AUTO: 11.4 FL (ref 8.9–12.7)
POTASSIUM SERPL-SCNC: 4.4 MMOL/L (ref 3.5–5.3)
PROT SERPL-MCNC: 7.1 G/DL (ref 6.4–8.4)
PROT UR STRIP-MCNC: NEGATIVE MG/DL
QRS AXIS: 166 DEGREES
QRSD INTERVAL: 116 MS
QT INTERVAL: 382 MS
QTC INTERVAL: 462 MS
RBC # BLD AUTO: 4.46 MILLION/UL (ref 3.81–5.12)
SODIUM SERPL-SCNC: 142 MMOL/L (ref 135–147)
SP GR UR STRIP.AUTO: 1.01 (ref 1–1.03)
T WAVE AXIS: 17 DEGREES
THC UR QL: NEGATIVE
TSH SERPL DL<=0.05 MIU/L-ACNC: 0.93 UIU/ML (ref 0.45–4.5)
UROBILINOGEN UR STRIP-ACNC: <2 MG/DL
VENTRICULAR RATE: 88 BPM
WBC # BLD AUTO: 6.6 THOUSAND/UL (ref 4.31–10.16)

## 2024-10-08 PROCEDURE — 71045 X-RAY EXAM CHEST 1 VIEW: CPT

## 2024-10-08 PROCEDURE — 84484 ASSAY OF TROPONIN QUANT: CPT

## 2024-10-08 PROCEDURE — 93010 ELECTROCARDIOGRAM REPORT: CPT | Performed by: STUDENT IN AN ORGANIZED HEALTH CARE EDUCATION/TRAINING PROGRAM

## 2024-10-08 PROCEDURE — 83690 ASSAY OF LIPASE: CPT

## 2024-10-08 PROCEDURE — 85025 COMPLETE CBC W/AUTO DIFF WBC: CPT

## 2024-10-08 PROCEDURE — 70450 CT HEAD/BRAIN W/O DYE: CPT

## 2024-10-08 PROCEDURE — 82075 ASSAY OF BREATH ETHANOL: CPT

## 2024-10-08 PROCEDURE — 84443 ASSAY THYROID STIM HORMONE: CPT

## 2024-10-08 PROCEDURE — 99285 EMERGENCY DEPT VISIT HI MDM: CPT | Performed by: EMERGENCY MEDICINE

## 2024-10-08 PROCEDURE — 81003 URINALYSIS AUTO W/O SCOPE: CPT

## 2024-10-08 PROCEDURE — 74177 CT ABD & PELVIS W/CONTRAST: CPT

## 2024-10-08 PROCEDURE — 96365 THER/PROPH/DIAG IV INF INIT: CPT

## 2024-10-08 PROCEDURE — 93005 ELECTROCARDIOGRAM TRACING: CPT

## 2024-10-08 PROCEDURE — 36415 COLL VENOUS BLD VENIPUNCTURE: CPT

## 2024-10-08 PROCEDURE — 99284 EMERGENCY DEPT VISIT MOD MDM: CPT

## 2024-10-08 PROCEDURE — 80053 COMPREHEN METABOLIC PANEL: CPT

## 2024-10-08 PROCEDURE — 80307 DRUG TEST PRSMV CHEM ANLYZR: CPT

## 2024-10-08 RX ORDER — ACETAMINOPHEN 10 MG/ML
1000 INJECTION, SOLUTION INTRAVENOUS ONCE
Status: COMPLETED | OUTPATIENT
Start: 2024-10-08 | End: 2024-10-08

## 2024-10-08 RX ADMIN — ACETAMINOPHEN 1000 MG: 10 INJECTION INTRAVENOUS at 12:00

## 2024-10-08 RX ADMIN — IOHEXOL 85 ML: 350 INJECTION, SOLUTION INTRAVENOUS at 13:15

## 2024-10-08 NOTE — ED NOTES
Intake / safety assessment was completed with patient who presents to ED with family member for headache which has been ongoing for a few weeks. Also reports having a history of depression. Patient denied any suicidal ideation or homicidal ideation at this time. Patient also denied any hallucinations. Patient did report that she tried to OD on pills in the past, but has no thoughts of wanting to harm herself at this time. Patient relays that she suffers from depression, but doesn't wish to see anyone for this nor take any additional medication as she reports all the medication she has taken in the past makes her drowsy and she doesn't like to feel this way. Patient reports that after she attempted to OD on pills she was very remorseful. 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 Patient did report feeling lonely at times, but noted that her family is very supportive. Patient did note that her PCP prescribes something for her to sleep. Patient relayed no concerns with appetite. At times she noted that her appetite is limited.     With patient's permission crisis worker spoke with patient's daughter in law Kateryna who is here with patient. Kateryna confirmed what patient relayed to crisis worker. Patient doesn't have access to any weapons. Both Kateryna and patient felt comfortable with patient being discharged.     Crisis worker spoke with patient about outpatient resources, but patient declined any resources at this time.      Patient also declined safety plan.  Patient to be discharged which was in agreement with.  Patient was encouraged to return to ED if symptoms worsened.  Patient's daughter in law Kateryna noted that she would follow up with patient's PCP to discuss possible resources for medications.

## 2024-10-08 NOTE — ED PROVIDER NOTES
Final diagnoses:   Headache   Depression     ED Disposition       ED Disposition   Discharge    Condition   Stable    Date/Time   Tue Oct 8, 2024  4:42 PM    Comment   Rosalind Wise discharge to home/self care.                   Assessment & Plan       Medical Decision Making  Female patient who presented to the emergency department for headache as well as ongoing depression.  On physical examination, patient was noted to have no acute findings and without focal neurologic deficits.  While in the emergency department, patient was given IV Tylenol for pain control.  Patient's labs showed no acute concerns.  Patient CT imaging showed no acute intracranial abnormality as well as mild gallbladder wall thickening and bilateral pleural effusions as well as a pulmonary nodule as per radiology.  Chest x-ray showed no acute cardiopulmonary disease at this time.  Due to patient's depression, she was evaluated by crisis who states patient is cleared to go home.  Patient is declining outpatient resources at this time.  Given patient's vital sign stability and no focal neurologic deficits, patient is likely having migraine headache possibly due to symptomatic manifestation of depression.  Low suspicion for intracranial hemorrhage, CVA, or infectious etiology.  Plan for patient is to be discharged home and follow-up outpatient as needed.  Patient also advised to use OTC medications as needed for pain control.  Also instructed to return to the emergency department if her symptoms worsen.    Amount and/or Complexity of Data Reviewed  Labs: ordered. Decision-making details documented in ED Course.  Radiology: ordered and independent interpretation performed. Decision-making details documented in ED Course.    Risk  Prescription drug management.        ED Course as of 10/09/24 2138   Tue Oct 08, 2024   1249 hs TnI 0hr: 30   1411 CT head without contrast  No acute intracranial abnormality of postsurgical brain.     Additional  chronic/incidental findings as detailed above.  As per radiology.   1418 CT abdomen pelvis with contrast  No findings to explain left lower quadrant pain.     Mild gallbladder wall thickening. No calcified gallstones are present. Consider ultrasound for further work-up if there is concern for cholecystitis.     Bilateral pleural effusions with adjacent consolidation. Mild pulmonary edema-like pattern.     Indeterminate solid pulmonary nodule measuring 4 mm. In a low-risk patient with a solid nodule <6 mm, recommend no follow-up. In a high-risk patient, CT at 12 months is optional with stronger consideration if there is suspicious nodule morphology and/or   upper lobe location.  As per radiology.   1445 hs TnI 2hr: 27   4281 Patient was seen and evaluated by crisis with no acute intervention at this point.  Patient is cleared to go home and is uninterested in any outpatient resources at this time.  Patient's daughter is agreeable to plan and is able to take her home safely.       Medications   acetaminophen (Ofirmev) injection 1,000 mg (0 mg Intravenous Stopped 10/8/24 1303)   iohexol (OMNIPAQUE) 350 MG/ML injection (MULTI-DOSE) 85 mL (85 mL Intravenous Given 10/8/24 1315)       ED Risk Strat Scores                                               History of Present Illness       Chief Complaint   Patient presents with    Medical Problem     Pt reports headache x3 days, epigastric pain when breathing, SOB, also reports depression on/off x5 years with fleeting thoughts       Past Medical History:   Diagnosis Date    Anxiety     Arthritis     joints    Bunion     Cataract     Disease of thyroid gland     Eczema     GERD (gastroesophageal reflux disease)     Gout     Heart failure (HCC)     Hepatitis     Hiatal hernia     Hypertension     Onychomycosis     last assessed: 4/29/16    Orthopnea     resolved: 1/8/16    Pseudogout of left wrist     Sleep apnea     wears CPAP    Stroke (HCC)       Past Surgical History:    Procedure Laterality Date    ABDOMINAL SURGERY          BRAIN HEMATOMA EVACUATION Right 2020    Procedure: Right decompressive craniectomy and evacuation of intraparenchymal hematoma;  Surgeon: Apolinar Herrera MD;  Location: [facility];  Service: Neurosurgery    BREAST SURGERY Right     cyst removal    CARPAL TUNNEL RELEASE Left     CARPAL TUNNEL RELEASE Right     CATARACT EXTRACTION       SECTION  1960    x1    COLONOSCOPY N/A 2018    Procedure: COLONOSCOPY;  Surgeon: Alejandro Carlson MD;  Location: [facility];  Service: Gastroenterology    DILATION AND CURETTAGE OF UTERUS  1967    EYE SURGERY Left 2006    cataracts    HERNIA REPAIR      umbilical hernia    INCISIONAL HERNIA REPAIR      incarcerated    KNEE ARTHROSCOPY Right     OR RPLCMT BONE FLAP/PROSTHETIC PLATE SKULL Right 2020    Procedure: right frontotemporal replacement cranioplasty;  Surgeon: Apolniar Herrera MD;  Location: [facility];  Service: Neurosurgery    OR XCAPSL CTRC RMVL INSJ IO LENS PROSTH W/O ECP Right 3/27/2017    Procedure: EXTRACTION EXTRACAPSULAR CATARACT PHACO INTRAOCULAR LENS (IOL);  Surgeon: Trip Gilbert MD;  Location: [facility];  Service: Ophthalmology      Family History   Problem Relation Age of Onset    Diabetes Mother     Coronary artery disease Mother     Arthritis Mother     Hypertension Mother     Hypertension Father     Diabetes Brother     Diabetes Son     Arthritis Family       Social History     Tobacco Use    Smoking status: Never    Smokeless tobacco: Never   Vaping Use    Vaping status: Never Used   Substance Use Topics    Alcohol use: Yes     Comment: Only on special occasions    Drug use: Never      E-Cigarette/Vaping    E-Cigarette Use Never User       E-Cigarette/Vaping Substances    Nicotine No     THC No     CBD No     Flavoring No     Other No     Unknown No       I have reviewed and agree with the history as documented.     83-year-old female with extensive PMH  including ICH, HTN, CHF, and depression who presents to the emergency department for headache that is been ongoing for the past few weeks.  Patient states she takes Tylenol at home for the headache with minimal improvement.  When describing the headache she states that it started on the right side of her head and now is on top.  She describes it as being and gradual in onset with a dull achy pain.  As per family member at bedside, patient has been acting appropriate and denies any changes in vision.  She is also complaining of epigastric pain which is currently resolved.  During interview, patient was also noted to be very tearful about being a burden on her family as well as her  who passed away 6 years ago.  No other acute concerns at this time. Denies chest pain, SOB, cough, n/v/d, fever, chills, dizziness, lightheadedness, dysuria, hematuria, hematochezia, or melena.             Medical Problem  Associated symptoms: abdominal pain and headaches    Associated symptoms: no chest pain, no congestion, no cough, no diarrhea, no ear pain, no fatigue, no fever, no nausea, no rash, no rhinorrhea, no shortness of breath, no sore throat and no vomiting        Review of Systems   Constitutional:  Negative for appetite change, chills, diaphoresis, fatigue and fever.   HENT:  Negative for congestion, ear pain, postnasal drip, rhinorrhea, sore throat and trouble swallowing.    Eyes:  Negative for pain and visual disturbance.   Respiratory:  Negative for cough and shortness of breath.    Cardiovascular:  Negative for chest pain and palpitations.   Gastrointestinal:  Positive for abdominal pain. Negative for constipation, diarrhea, nausea and vomiting.   Genitourinary:  Negative for decreased urine volume, dysuria and hematuria.   Musculoskeletal:  Negative for arthralgias and back pain.   Skin:  Negative for color change and rash.   Neurological:  Positive for headaches. Negative for dizziness, seizures, syncope,  weakness and light-headedness.   All other systems reviewed and are negative.          Objective       ED Triage Vitals   Temperature Pulse Blood Pressure Respirations SpO2 Patient Position - Orthostatic VS   10/08/24 1011 10/08/24 1011 10/08/24 1011 10/08/24 1011 10/08/24 1011 10/08/24 1011   98 °F (36.7 °C) (!) 110 143/81 20 100 % Sitting      Temp Source Heart Rate Source BP Location FiO2 (%) Pain Score    10/08/24 1011 10/08/24 1011 10/08/24 1011 -- 10/08/24 1410    Oral Monitor Right arm  No Pain      Vitals      Date and Time Temp Pulse SpO2 Resp BP Pain Score FACES Pain Rating User   10/08/24 1600 -- 98 98 % 16 142/65 -- -- AG   10/08/24 1500 -- 98 97 % -- 160/77 -- -- CF   10/08/24 1415 -- 84 97 % -- 151/72 -- -- CF   10/08/24 1410 -- 73 98 % 16 151/72 No Pain -- DB   10/08/24 1011 98 °F (36.7 °C) 110 100 % 20 143/81 -- -- AW            Physical Exam  Vitals and nursing note reviewed.   Constitutional:       Appearance: Normal appearance. She is normal weight.   HENT:      Head: Normocephalic and atraumatic.      Right Ear: External ear normal.      Left Ear: External ear normal.      Nose: Nose normal.      Mouth/Throat:      Pharynx: Oropharynx is clear.   Eyes:      Conjunctiva/sclera: Conjunctivae normal.   Cardiovascular:      Rate and Rhythm: Normal rate and regular rhythm.      Pulses: Normal pulses.      Heart sounds: Normal heart sounds.      Comments: RRR with +S1 and S2, no murmurs appreciated on exam. Peripheral pulses intact.    Pulmonary:      Effort: Pulmonary effort is normal.      Breath sounds: Normal breath sounds.      Comments: CTABL with no abnormal lung sounds such as wheezes or rales appreciated on exam.     Abdominal:      General: Abdomen is flat. Bowel sounds are normal.      Palpations: Abdomen is soft.      Comments: Soft, non tender, normo-active bowel sounds. Without rigidity, guarding, or distension.     Musculoskeletal:         General: Normal range of motion.      Cervical  back: Normal range of motion.   Skin:     General: Skin is warm and dry.   Neurological:      General: No focal deficit present.      Mental Status: She is alert and oriented to person, place, and time. Mental status is at baseline.      Comments: CN grossly intact on visualization. No focal neurologic deficits noted on exam.  5/5 strength in all extremities. Neurovascularly intact with normal sensation and motor function.             Results Reviewed       Procedure Component Value Units Date/Time    Rapid drug screen, urine [[id number]]  (Normal) Collected: 10/08/24 1306    Lab Status: Final result Specimen: Urine, Clean Catch Updated: 10/08/24 1526     Amph/Meth UR Negative     Barbiturate Ur Negative     Benzodiazepine Urine Negative     Cocaine Urine Negative     Methadone Urine Negative     Opiate Urine Negative     PCP Ur Negative     THC Urine Negative     Oxycodone Urine Negative     Fentanyl Urine Negative     HYDROCODONE URINE Negative    Narrative:      FOR MEDICAL PURPOSES ONLY.   IF CONFIRMATION NEEDED PLEASE CONTACT THE LAB WITHIN 5 DAYS.    Drug Screen Cutoff Levels:  AMPHETAMINE/METHAMPHETAMINES  1000 ng/mL  BARBITURATES     200 ng/mL  BENZODIAZEPINES     200 ng/mL  COCAINE      300 ng/mL  METHADONE      300 ng/mL  OPIATES      300 ng/mL  PHENCYCLIDINE     25 ng/mL  THC       50 ng/mL  OXYCODONE      100 ng/mL  FENTANYL      5 ng/mL  HYDROCODONE     300 ng/mL    TSH, 3rd generation with Free T4 reflex [[id number]]  (Normal) Collected: 10/08/24 1159    Lab Status: Final result Specimen: Blood from Arm, Right Updated: 10/08/24 1500     TSH 3RD GENERATON 0.930 uIU/mL     POCT alcohol breath test [[id number]]  (Normal) Resulted: 10/08/24 1447    Lab Status: Final result Updated: 10/08/24 1447     EXTBreath Alcohol 0.000%    HS Troponin I 2hr [[id number]]  (Normal) Collected: 10/08/24 1411    Lab Status: Final result Specimen: Blood from Arm, Right Updated: 10/08/24 1445     hs TnI 2hr 27 ng/L      Delta 2hr  hsTnI -3 ng/L     UA w Reflex to Microscopic w Reflex to Culture [[id number]] Collected: 10/08/24 1306    Lab Status: Final result Specimen: Urine, Clean Catch Updated: 10/08/24 1318     Color, UA Colorless     Clarity, UA Clear     Specific Gravity, UA 1.007     pH, UA 7.5     Leukocytes, UA Negative     Nitrite, UA Negative     Protein, UA Negative mg/dl      Glucose, UA Negative mg/dl      Ketones, UA Negative mg/dl      Urobilinogen, UA <2.0 mg/dl      Bilirubin, UA Negative     Occult Blood, UA Negative    HS Troponin 0hr (reflex protocol) [[id number]]  (Normal) Collected: 10/08/24 1159    Lab Status: Final result Specimen: Blood from Arm, Right Updated: 10/08/24 1232     hs TnI 0hr 30 ng/L     Lipase [[id number]]  (Normal) Collected: 10/08/24 1159    Lab Status: Final result Specimen: Blood from Arm, Right Updated: 10/08/24 1228     Lipase 19 u/L     Comprehensive metabolic panel [[id number]]  (Abnormal) Collected: 10/08/24 1159    Lab Status: Final result Specimen: Blood from Arm, Right Updated: 10/08/24 1228     Sodium 142 mmol/L      Potassium 4.4 mmol/L      Chloride 103 mmol/L      CO2 32 mmol/L      ANION GAP 7 mmol/L      BUN 29 mg/dL      Creatinine 1.34 mg/dL      Glucose 89 mg/dL      Calcium 9.9 mg/dL      AST 16 U/L      ALT 13 U/L      Alkaline Phosphatase 101 U/L      Total Protein 7.1 g/dL      Albumin 4.1 g/dL      Total Bilirubin 0.91 mg/dL      eGFR 36 ml/min/1.73sq m     Narrative:      [employer] Kid[employer] guidelines for Chronic Kidney Disease (CKD):     Stage 1 with normal or high GFR (GFR > 90 mL/min/1.73 square meters)    Stage 2 Mild CKD (GFR = 60-89 mL/min/1.73 square meters)    Stage 3A Moderate CKD (GFR = 45-59 mL/min/1.73 square meters)    Stage 3B Moderate CKD (GFR = 30-44 mL/min/1.73 square meters)    Stage 4 Severe CKD (GFR = 15-29 mL/min/1.73 square meters)    Stage 5 End Stage CKD (GFR <15 mL/min/1.73 square meters)  Note: GFR calculation is accurate only with a  steady state creatinine    CBC and differential [[id number]]  (Abnormal) Collected: 10/08/24 1159    Lab Status: Final result Specimen: Blood from Arm, Right Updated: 10/08/24 1214     WBC 6.60 Thousand/uL      RBC 4.46 Million/uL      Hemoglobin 12.6 g/dL      Hematocrit 39.3 %      MCV 88 fL      MCH 28.3 pg      MCHC 32.1 g/dL      RDW 14.9 %      MPV 11.4 fL      Platelets 141 Thousands/uL      nRBC 0 /100 WBCs      Segmented % 79 %      Immature Grans % 0 %      Lymphocytes % 13 %      Monocytes % 6 %      Eosinophils Relative 1 %      Basophils Relative 1 %      Absolute Neutrophils 5.31 Thousands/µL      Absolute Immature Grans 0.01 Thousand/uL      Absolute Lymphocytes 0.84 Thousands/µL      Absolute Monocytes 0.37 Thousand/µL      Eosinophils Absolute 0.04 Thousand/µL      Basophils Absolute 0.03 Thousands/µL             CT abdomen pelvis with contrast   Final Interpretation by Abram Ramirez MD (10/08 1410)      No findings to explain left lower quadrant pain.      Mild gallbladder wall thickening. No calcified gallstones are present. Consider ultrasound for further work-up if there is concern for cholecystitis.      Bilateral pleural effusions with adjacent consolidation. Mild pulmonary edema-like pattern.      Indeterminate solid pulmonary nodule measuring 4 mm. In a low-risk patient with a solid nodule <6 mm, recommend no follow-up. In a high-risk patient, CT at 12 months is optional with stronger consideration if there is suspicious nodule morphology and/or    upper lobe location.         Workstation performed: [id number]         CT head without contrast   Final Interpretation by Scottie Samayoa MD (10/08 4212)      No acute intracranial abnormality of postsurgical brain.      Additional chronic/incidental findings as detailed above.                        Resident: TRISTIN HUGHES I, the attending radiologist, have reviewed the images and agree with the final report above.       Workstation performed: [id number]         XR chest 1 view portable   ED Interpretation by Frederick Pizano MD (10/08 1251)   Image was independently interpreted by myself and showed mild right lower opacification as compared to previous imaging from March 2024.        Final Interpretation by Abram Ramirez MD (10/08 4355)      No acute cardiopulmonary disease.            Workstation performed: [id number]             ECG 12 Lead Documentation Only    Date/Time: 10/8/2024 10:19 AM    Performed by: Frederick Pizano MD  Authorized by: Frederick Pizano MD    ECG reviewed by me, the ED Provider: yes    Patient location:  ED  Previous ECG:     Previous ECG:  Compared to current    Similarity:  No change  Interpretation:     Interpretation: abnormal    Rate:     ECG rate:  88    ECG rate assessment: normal    Rhythm:     Rhythm: atrial fibrillation    Ectopy:     Ectopy: none    QRS:     QRS axis:  Right    QRS intervals:  Normal  Conduction:     Conduction: abnormal      Abnormal conduction: incomplete RBBB    ST segments:     ST segments:  Normal  T waves:     T waves: normal        ED Medication and Procedure Management   Prior to Admission Medications   Prescriptions Last Dose Informant Patient Reported? Taking?   Cholecalciferol (Vitamin D3) 50 MCG CAPS  Self Yes No   Sig: Take by mouth in the morning   Diclofenac Sodium (VOLTAREN) 1 %  Self No No   Sig: Apply 2 g topically in the morning APPLY NO MORE THAN 3 DAYS IN A ROW THEN TAKE A BREAK FOR ONE WEEK FROM USE.   Magnesium 400 MG CAPS  Self Yes No   Sig: Take by mouth in the morning Take 500 mg. BID   apixaban (Eliquis) 2.5 mg  Self No No   Sig: Take 1 tablet (2.5 mg total) by mouth 2 (two) times a day   atorvastatin (LIPITOR) 40 mg tablet  Self No No   Sig: TAKE 1 TABLET EVERY EVENING   bumetanide (BUMEX) 1 mg tablet  Self No No   Sig: Take 1 tablet (1 mg total) by mouth daily   hydrOXYzine HCL (ATARAX) 10 mg tablet   No No   Sig: Take 1 tablet (10 mg total) by mouth  daily at bedtime as needed (insomnia)   levothyroxine 88 mcg tablet   No No   Sig: Take 1 tablet (88 mcg total) by mouth daily   losartan (COZAAR) 25 mg tablet  Self No No   Sig: TAKE 2 TABLETS IN THE MORNING AND TAKE 1 TABLET IN THE EVENING   metoprolol tartrate (LOPRESSOR) 25 mg tablet  Self No No   Sig: TAKE 1/2 TABLET EVERY 12 HOURS   pantoprazole (PROTONIX) 20 mg tablet   No No   Sig: Take 1 tablet (20 mg total) by mouth daily   spironolactone (ALDACTONE) 25 mg tablet  Self No No   Sig: Take 0.5 tablets (12.5 mg total) by mouth daily      Facility-Administered Medications Last Administration Doses Remaining   triamcinolone acetonide (KENALOG-40) 40 mg/mL injection 20 mg 10/3/2024 10:45 AM         Discharge Medication List as of 10/8/2024  4:44 PM        CONTINUE these medications which have NOT CHANGED    Details   apixaban (Eliquis) 2.5 mg Take 1 tablet (2.5 mg total) by mouth 2 (two) times a day, Starting Fri 10/27/2023, Normal      atorvastatin (LIPITOR) 40 mg tablet TAKE 1 TABLET EVERY EVENING, Starting Wed 5/15/2024, Normal      bumetanide (BUMEX) 1 mg tablet Take 1 tablet (1 mg total) by mouth daily, Starting Mon 2/12/2024, Normal      Cholecalciferol (Vitamin D3) 50 MCG CAPS Take by mouth in the morning, Historical Med      Diclofenac Sodium (VOLTAREN) 1 % Apply 2 g topically in the morning APPLY NO MORE THAN 3 DAYS IN A ROW THEN TAKE A BREAK FOR ONE WEEK FROM USE., Starting Tue 7/18/2023, Normal      hydrOXYzine HCL (ATARAX) 10 mg tablet Take 1 tablet (10 mg total) by mouth daily at bedtime as needed (insomnia), Starting Mon 7/8/2024, Normal      levothyroxine 88 mcg tablet Take 1 tablet (88 mcg total) by mouth daily, Starting Mon 7/8/2024, Normal      losartan (COZAAR) 25 mg tablet TAKE 2 TABLETS IN THE MORNING AND TAKE 1 TABLET IN THE EVENING, Normal      Magnesium 400 MG CAPS Take by mouth in the morning Take 500 mg. BID, Historical Med      metoprolol tartrate (LOPRESSOR) 25 mg tablet TAKE 1/2  TABLET EVERY 12 HOURS, Starting Thu 4/25/2024, Normal      pantoprazole (PROTONIX) 20 mg tablet Take 1 tablet (20 mg total) by mouth daily, Starting Mon 7/8/2024, Normal      spironolactone (ALDACTONE) 25 mg tablet Take 0.5 tablets (12.5 mg total) by mouth daily, Starting Thu 11/30/2023, Normal           No discharge procedures on file.  ED SEPSIS DOCUMENTATION   Time reflects when diagnosis was documented in both MDM as applicable and the Disposition within this note       Time User Action Codes Description Comment    10/8/2024  4:42 PM Frederick Pizano [R51.9] Headache     10/8/2024  4:42 PM Frederick Pizano [F32.A] Depression                  Frederick Pizano MD  10/09/24 6261

## 2024-10-09 ENCOUNTER — VBI (OUTPATIENT)
Dept: FAMILY MEDICINE CLINIC | Facility: CLINIC | Age: 84
End: 2024-10-09

## 2024-10-09 NOTE — TELEPHONE ENCOUNTER
10/09/24 9:27 AM    Patient contacted post ED visit, [facility] department spoke with patient/caregiver and outreach was successful.    Thank you.  Deshaun Mercado MA  [facility]

## 2024-10-14 NOTE — PROGRESS NOTES
NEPHROLOGY OFFICE VISIT   Rosalind Wise 83 y.o. female MRN: [id number]  10/15/2024    Reason for Visit: Follow-up    INTERVAL HISTORY and SUBJECTIVE:    I had the pleasure of seeing Rosalind today in the renal clinic.  She is a 83-year-old female who follows with Dr. Miller for management of CKD/renal transplant follow-up.  She was last seen in the nephrology clinic 2024.  She also has a history of hypertension, atrial fibrillation, anxiety, depression, GERD, WENDI, gout and hypothyroidism.  She has moderate to severe mitral regurg monitored by cardiology.  He was recently seen in the emergency room on 10/8 due to weakness and depression.  She was also having headaches for which she was taking Tylenol.  She has been depressed since her  .  CT showed no acute intracranial abnormalities.  Noted bilateral pleural effusions.  Patient was discharged home after workup was negative.  Felt to be likely having a migraine headache.    Intermittent headache reported which comes and goes rather quickly.  She sometimes has some dizziness, nausea.  Her daughter-in-law feels that it is related to poor intake of protein.  We had a long discussion regarding food choices and supplements.    Labs obtained on 10/8/2024 reviewed with Rosalind.    ASSESSMENT AND PLAN:    Chronic kidney disease, stage IIIbA1:   Recent creatinine 1.34 on 10/8.  Patient was seen in the emergency room that day for migraine headache  Etiology/risk factors: Longstanding hypertension and prior episode of ATN  Baseline creatinine 1.1 to 1.3 mg/dL although periodic bumps in her creatinine up to 1.4  Prior workup unrevealing  Urinalysis 10/8: Negative protein, negative blood  UPCR 0.23.  No significant change  UACR: Level too low to calculate.  Recent CT of the abdomen and pelvis with contrast on 10/8: Kidneys unremarkable, no obstruction, no hydronephrosis.  Renal function stable  Follow-up labs prior to next visit  Patient instructed to avoid  NSAIDs.    Hypertension/volume:  Medications: Metoprolol, losartan, spironolactone and Bumex  Patient performs home blood pressure monitoring.  HOME: 115-120 SYSTOLIC. Hr 90  Blood pressure generally acceptable  Blood pressure acceptable.  Higher than home reading but still acceptable considering advanced age.  She is also complaining intermittently of having some dizziness.  Unclear etiology.  She was recently seen in the emergency room for headaches/migraine.  Her daughter-in-law feels that she is not eating well.  She has lost weight and is down to 146 pounds.  She states that she feels good at this weight, less arthritic pain.  No changes at this time considering fluctuations in blood pressure.  I did review proper method of obtaining home readings and gave written information.  Instructed to call if blood pressure remains consistently less than 110/60  I did instruct Rosalind and her daughter-in-law that they could bring her home blood pressure monitor to the next appointment for correlation    CKD-MBD:  Calcium level acceptable  Monitor vitamin D level    Electrolytes/acid-base: Acceptable    Gout: Quiescent.  No recent flares    Other CKD monitoring: Hemoglobin within normal limits, 12.6    Hypothyroidism: Management per PCP    Hyperlipidemia: Management per PCP    Atrial fibrillation:  On beta-blocker, Eliquis  Rate controlled    Mitral valve calcification/moderate to severe MR  Cardiology following    Migraine headache: Recently seen in the emergency room.  Workup unrevealing    PATIENT INSTRUCTIONS:    Patient Instructions   Thank you for your kind visit.  You were seen today for continued management and monitoring of kidney function.  You have chronic kidney disease stage III which has not progressed or worsened.  At this time kidney function is stable.    Recommendations:  Continue current medications  Avoid NSAIDs: Motrin Aleve ibuprofen Advil these over-the-counter pain medications can be harmful to  your kidneys and cause damage to other part of your body also.  These pain medications can also raise blood pressure.  You can take Tylenol for control of mild pain  If you have a question about the safety of medication call our office or asked the pharmacist  Follow-up appointment IN 4-6 MONTHS  Call if Home BP stays less than 110/60.  Bring in home BP monitor to next appointment  Home BP monitoring-- see below for instructions   Obtain home blood pressure readings as follows:  In the morning please take blood pressure reading before medications.   Please sit quietly at least 5 minutes before taking blood pressure reading.  Make sure your arm is supported at heart level.  Even if you measure your blood pressure standing up your arm should be supported at heart level.    In the evening please take blood pressure reading between 7-10 p.m. prior to any evening medications and at least an hour after eating dinner.  Sit quietly for at least 5 minutes.    Document readings twice a day for 1 week prior to office visit   Document heart rate along with blood pressure reading   General instructions:    Make sure your sitting comfortably with back supported and both feet on the floor.   Do not cross your legs.  Do not talk during blood pressure measurement.  Avoid coffee or caffeine at least 30 minutes prior to measurement.   Do not take blood pressure measurement within 30 minutes of exercising.  Do not smoke cigarettes.  If you are taking a reading while standing make sure your arm is supported at heart level and not dangling at your side.  Make sure the cuff is properly positioned above the crease at your elbow joint-   Check  guidelines  Additional Information can be found at the following  https://www.heart.org/en/health-topics/high-blood-pressure/understanding-blood-pressure-readings/monitoring-your-blood-pressure-at-home  https://targetbp.org/tools_downloads/self-measured-blood-pressure-video/       Orders  Placed This Encounter   Procedures    Albumin / creatinine urine ratio     Standing Status:   Future     Standing Expiration Date:   10/15/2025    CBC     Standing Status:   Future     Standing Expiration Date:   10/15/2025    Magnesium     Standing Status:   Future     Standing Expiration Date:   10/15/2025    Renal function panel     Standing Status:   Future     Standing Expiration Date:   10/15/2025    Urinalysis with microscopic     Standing Status:   Future     Standing Expiration Date:   10/15/2025       OBJECTIVE:  Current Weight: Weight - Scale: 66.2 kg (146 lb)  Vitals:    10/15/24 1028   BP: 138/70   BP Location: Left arm   Patient Position: Sitting   Cuff Size: Standard   Pulse: 81   Weight: 66.2 kg (146 lb)   Height: 5' (1.524 m)    Body mass index is 28.51 kg/m².      REVIEW OF SYSTEMS:    Review of Systems   Constitutional:  Positive for appetite change (Appetite waxes and wanes.  She tends to eat more carbohydrates than protein.). Negative for chills, fever and unexpected weight change.   HENT:  Negative for congestion, ear pain and sore throat.    Eyes:  Negative for visual disturbance.   Respiratory:  Negative for cough and shortness of breath.    Cardiovascular:  Negative for chest pain and palpitations.   Gastrointestinal:  Positive for nausea (Intermittent nausea possibly related to headache). Negative for abdominal pain, blood in stool, constipation, diarrhea and vomiting.   Endocrine: Positive for cold intolerance.   Genitourinary:  Negative for difficulty urinating, dysuria and hematuria.   Musculoskeletal:  Positive for arthralgias. Negative for back pain.        Less arthritic pain since she lost weight   Skin:  Negative for color change and rash.   Allergic/Immunologic: Negative for immunocompromised state.   Neurological:  Positive for dizziness and headaches. Negative for seizures, syncope and weakness.   Hematological:  Does not bruise/bleed easily.   Psychiatric/Behavioral:  The  patient is nervous/anxious.    All other systems reviewed and are negative.      PHYSICAL EXAM:      Physical Exam  Constitutional:       General: She is not in acute distress.     Appearance: She is well-developed. She is not ill-appearing, toxic-appearing or diaphoretic.   HENT:      Head: Normocephalic and atraumatic.      Nose: Nose normal. No congestion.      Mouth/Throat:      Mouth: Mucous membranes are moist.   Eyes:      Extraocular Movements: Extraocular movements intact.      Conjunctiva/sclera: Conjunctivae normal.   Neck:      Thyroid: No thyromegaly.      Vascular: No carotid bruit or JVD.      Trachea: No tracheal deviation.   Cardiovascular:      Rate and Rhythm: Normal rate. Rhythm irregular.      Heart sounds: No murmur heard.     No friction rub. No gallop.   Pulmonary:      Effort: Pulmonary effort is normal. No respiratory distress.      Breath sounds: Normal breath sounds. No stridor. No wheezing, rhonchi or rales.   Chest:      Chest wall: No tenderness.   Abdominal:      General: Bowel sounds are normal. There is no distension.      Palpations: Abdomen is soft. There is no mass.      Tenderness: There is no abdominal tenderness. There is no guarding or rebound.   Musculoskeletal:         General: No swelling, tenderness or signs of injury. Normal range of motion.      Cervical back: Normal range of motion and neck supple. No rigidity or tenderness.      Right lower leg: No edema.      Left lower leg: No edema.   Skin:     General: Skin is warm and dry.      Capillary Refill: Capillary refill takes less than 2 seconds.      Coloration: Skin is not jaundiced or pale.      Findings: No erythema or rash.   Neurological:      Mental Status: She is alert and oriented to person, place, and time.   Psychiatric:         Mood and Affect: Mood is anxious.         Behavior: Behavior normal.         Thought Content: Thought content normal.         Judgment: Judgment normal.         Medications:    Current  Outpatient Medications:     apixaban (Eliquis) 2.5 mg, Take 1 tablet (2.5 mg total) by mouth 2 (two) times a day, Disp: 180 tablet, Rfl: 3    atorvastatin (LIPITOR) 40 mg tablet, TAKE 1 TABLET EVERY EVENING, Disp: 90 tablet, Rfl: 1    bumetanide (BUMEX) 1 mg tablet, Take 1 tablet (1 mg total) by mouth daily, Disp: 90 tablet, Rfl: 3    Cholecalciferol (Vitamin D3) 50 MCG CAPS, Take by mouth in the morning, Disp: , Rfl:     Diclofenac Sodium (VOLTAREN) 1 %, Apply 2 g topically in the morning APPLY NO MORE THAN 3 DAYS IN A ROW THEN TAKE A BREAK FOR ONE WEEK FROM USE., Disp: 100 g, Rfl: 1    hydrOXYzine HCL (ATARAX) 10 mg tablet, Take 1 tablet (10 mg total) by mouth daily at bedtime as needed (insomnia), Disp: 90 tablet, Rfl: 1    levothyroxine 88 mcg tablet, Take 1 tablet (88 mcg total) by mouth daily, Disp: 90 tablet, Rfl: 1    losartan (COZAAR) 25 mg tablet, TAKE 2 TABLETS IN THE MORNING AND TAKE 1 TABLET IN THE EVENING, Disp: 270 tablet, Rfl: 10    Magnesium 400 MG CAPS, Take by mouth in the morning Take 500 mg. BID, Disp: , Rfl:     metoprolol tartrate (LOPRESSOR) 25 mg tablet, TAKE 1/2 TABLET EVERY 12 HOURS, Disp: 90 tablet, Rfl: 1    pantoprazole (PROTONIX) 20 mg tablet, Take 1 tablet (20 mg total) by mouth daily, Disp: 90 tablet, Rfl: 1    spironolactone (ALDACTONE) 25 mg tablet, Take 0.5 tablets (12.5 mg total) by mouth daily, Disp: 45 tablet, Rfl: 3    Current Facility-Administered Medications:     triamcinolone acetonide (KENALOG-40) 40 mg/mL injection 20 mg, 20 mg, Infiltration, , , 20 mg at 10/03/24 1045    Laboratory Results:  Results from last 7 days   Lab Units 10/08/24  1159   WBC Thousand/uL 6.60   HEMOGLOBIN g/dL 12.6   HEMATOCRIT % 39.3   PLATELETS Thousands/uL 141*   POTASSIUM mmol/L 4.4   CHLORIDE mmol/L 103   CO2 mmol/L 32   BUN mg/dL 29*   CREATININE mg/dL 1.34*   CALCIUM mg/dL 9.9       Results for orders placed or performed during the hospital encounter of 10/08/24   ECG 12 lead    Collection  Time: 10/08/24 10:19 AM   Result Value Ref Range    Ventricular Rate 88 BPM    Atrial Rate 326 BPM    DE Interval  ms    QRSD Interval 116 ms    QT Interval 382 ms    QTC Interval 462 ms    P Axis  degrees    QRS Axis 166 degrees    T Wave Axis 17 degrees   CBC and differential    Collection Time: 10/08/24 11:59 AM   Result Value Ref Range    WBC 6.60 4.31 - 10.16 Thousand/uL    RBC 4.46 3.81 - 5.12 Million/uL    Hemoglobin 12.6 11.5 - 15.4 g/dL    Hematocrit 39.3 34.8 - 46.1 %    MCV 88 82 - 98 fL    MCH 28.3 26.8 - 34.3 pg    MCHC 32.1 31.4 - 37.4 g/dL    RDW 14.9 11.6 - 15.1 %    MPV 11.4 8.9 - 12.7 fL    Platelets 141 (L) 149 - 390 Thousands/uL    nRBC 0 /100 WBCs    Segmented % 79 (H) 43 - 75 %    Immature Grans % 0 0 - 2 %    Lymphocytes % 13 (L) 14 - 44 %    Monocytes % 6 4 - 12 %    Eosinophils Relative 1 0 - 6 %    Basophils Relative 1 0 - 1 %    Absolute Neutrophils 5.31 1.85 - 7.62 Thousands/µL    Absolute Immature Grans 0.01 0.00 - 0.20 Thousand/uL    Absolute Lymphocytes 0.84 0.60 - 4.47 Thousands/µL    Absolute Monocytes 0.37 0.17 - 1.22 Thousand/µL    Eosinophils Absolute 0.04 0.00 - 0.61 Thousand/µL    Basophils Absolute 0.03 0.00 - 0.10 Thousands/µL   Comprehensive metabolic panel    Collection Time: 10/08/24 11:59 AM   Result Value Ref Range    Sodium 142 135 - 147 mmol/L    Potassium 4.4 3.5 - 5.3 mmol/L    Chloride 103 96 - 108 mmol/L    CO2 32 21 - 32 mmol/L    ANION GAP 7 4 - 13 mmol/L    BUN 29 (H) 5 - 25 mg/dL    Creatinine 1.34 (H) 0.60 - 1.30 mg/dL    Glucose 89 65 - 140 mg/dL    Calcium 9.9 8.4 - 10.2 mg/dL    AST 16 13 - 39 U/L    ALT 13 7 - 52 U/L    Alkaline Phosphatase 101 34 - 104 U/L    Total Protein 7.1 6.4 - 8.4 g/dL    Albumin 4.1 3.5 - 5.0 g/dL    Total Bilirubin 0.91 0.20 - 1.00 mg/dL    eGFR 36 ml/min/1.73sq m   Lipase    Collection Time: 10/08/24 11:59 AM   Result Value Ref Range    Lipase 19 11 - 82 u/L   HS Troponin 0hr (reflex protocol)    Collection Time: 10/08/24 11:59 AM  "  Result Value Ref Range    hs TnI 0hr 30 \"Refer to ACS Flowchart\"- see link ng/L   TSH, 3rd generation with Free T4 reflex    Collection Time: 10/08/24 11:59 AM   Result Value Ref Range    TSH 3RD GENERATON 0.930 0.450 - 4.500 uIU/mL   UA w Reflex to Microscopic w Reflex to Culture    Collection Time: 10/08/24  1:06 PM    Specimen: Urine, Clean Catch   Result Value Ref Range    Color, UA Colorless     Clarity, UA Clear     Specific Gravity, UA 1.007 1.003 - 1.030    pH, UA 7.5 4.5, 5.0, 5.5, 6.0, 6.5, 7.0, 7.5, 8.0    Leukocytes, UA Negative Negative    Nitrite, UA Negative Negative    Protein, UA Negative Negative mg/dl    Glucose, UA Negative Negative mg/dl    Ketones, UA Negative Negative mg/dl    Urobilinogen, UA <2.0 <2.0 mg/dl mg/dl    Bilirubin, UA Negative Negative    Occult Blood, UA Negative Negative   Rapid drug screen, urine    Collection Time: 10/08/24  1:06 PM   Result Value Ref Range    Amph/Meth UR Negative Negative    Barbiturate Ur Negative Negative    Benzodiazepine Urine Negative Negative    Cocaine Urine Negative Negative    Methadone Urine Negative Negative    Opiate Urine Negative Negative    PCP Ur Negative Negative    THC Urine Negative Negative    Oxycodone Urine Negative Negative    Fentanyl Urine Negative Negative    HYDROCODONE URINE Negative Negative   HS Troponin I 2hr    Collection Time: 10/08/24  2:11 PM   Result Value Ref Range    hs TnI 2hr 27 \"Refer to ACS Flowchart\"- see link ng/L    Delta 2hr hsTnI -3 <20 ng/L   POCT alcohol breath test    Collection Time: 10/08/24  2:47 PM   Result Value Ref Range    EXTBreath Alcohol 0.000%           "

## 2024-10-15 ENCOUNTER — OFFICE VISIT (OUTPATIENT)
Dept: NEPHROLOGY | Facility: CLINIC | Age: 84
End: 2024-10-15
Payer: MEDICARE

## 2024-10-15 VITALS
HEART RATE: 81 BPM | DIASTOLIC BLOOD PRESSURE: 70 MMHG | HEIGHT: 60 IN | SYSTOLIC BLOOD PRESSURE: 138 MMHG | BODY MASS INDEX: 28.66 KG/M2 | WEIGHT: 146 LBS

## 2024-10-15 DIAGNOSIS — E78.2 MIXED HYPERLIPIDEMIA: ICD-10-CM

## 2024-10-15 DIAGNOSIS — I50.33 ACUTE ON CHRONIC DIASTOLIC CONGESTIVE HEART FAILURE (HCC): ICD-10-CM

## 2024-10-15 DIAGNOSIS — I10 BENIGN ESSENTIAL HYPERTENSION: ICD-10-CM

## 2024-10-15 DIAGNOSIS — E79.0 HYPERURICEMIA: ICD-10-CM

## 2024-10-15 DIAGNOSIS — N18.30 STAGE 3 CHRONIC KIDNEY DISEASE, UNSPECIFIED WHETHER STAGE 3A OR 3B CKD (HCC): Primary | Chronic | ICD-10-CM

## 2024-10-15 DIAGNOSIS — E83.42 HYPOMAGNESEMIA: ICD-10-CM

## 2024-10-15 DIAGNOSIS — E03.9 ACQUIRED HYPOTHYROIDISM: Chronic | ICD-10-CM

## 2024-10-15 DIAGNOSIS — I13.0 HYPERTENSIVE HEART AND CHRONIC KIDNEY DISEASE WITH HEART FAILURE AND STAGE 1 THROUGH STAGE 4 CHRONIC KIDNEY DISEASE, OR CHRONIC KIDNEY DISEASE (HCC): ICD-10-CM

## 2024-10-15 DIAGNOSIS — I48.0 PAROXYSMAL ATRIAL FIBRILLATION (HCC): ICD-10-CM

## 2024-10-15 PROBLEM — N20.0 NEPHROLITHIASIS: Status: RESOLVED | Noted: 2017-06-21 | Resolved: 2024-10-15

## 2024-10-15 PROCEDURE — 99214 OFFICE O/P EST MOD 30 MIN: CPT | Performed by: NURSE PRACTITIONER

## 2024-10-15 NOTE — PATIENT INSTRUCTIONS
Thank you for your kind visit.  You were seen today for continued management and monitoring of kidney function.  You have chronic kidney disease stage III which has not progressed or worsened.  At this time kidney function is stable.    Recommendations:  Continue current medications  Avoid NSAIDs: Motrin Aleve ibuprofen Advil these over-the-counter pain medications can be harmful to your kidneys and cause damage to other part of your body also.  These pain medications can also raise blood pressure.  You can take Tylenol for control of mild pain  If you have a question about the safety of medication call our office or asked the pharmacist  Follow-up appointment IN 4-6 MONTHS  Call if Home BP stays less than 110/60.  Bring in home BP monitor to next appointment  Home BP monitoring-- see below for instructions   Obtain home blood pressure readings as follows:  In the morning please take blood pressure reading before medications.   Please sit quietly at least 5 minutes before taking blood pressure reading.  Make sure your arm is supported at heart level.  Even if you measure your blood pressure standing up your arm should be supported at heart level.    In the evening please take blood pressure reading between 7-10 p.m. prior to any evening medications and at least an hour after eating dinner.  Sit quietly for at least 5 minutes.    Document readings twice a day for 1 week prior to office visit   Document heart rate along with blood pressure reading   General instructions:    Make sure your sitting comfortably with back supported and both feet on the floor.   Do not cross your legs.  Do not talk during blood pressure measurement.  Avoid coffee or caffeine at least 30 minutes prior to measurement.   Do not take blood pressure measurement within 30 minutes of exercising.  Do not smoke cigarettes.  If you are taking a reading while standing make sure your arm is supported at heart level and not dangling at your side.  Make  sure the cuff is properly positioned above the crease at your elbow joint-   Check  guidelines  Additional Information can be found at the following  https://www.heart.org/en/health-topics/high-blood-pressure/understanding-blood-pressure-readings/monitoring-your-blood-pressure-at-home  https://targetbp.org/tools_downloads/self-measured-blood-pressure-video/

## 2024-10-19 DIAGNOSIS — I10 BENIGN ESSENTIAL HTN: ICD-10-CM

## 2024-10-21 RX ORDER — LOSARTAN POTASSIUM 25 MG/1
TABLET ORAL
Qty: 270 TABLET | Refills: 1 | Status: SHIPPED | OUTPATIENT
Start: 2024-10-21

## 2024-10-28 DIAGNOSIS — G47.09 OTHER INSOMNIA: ICD-10-CM

## 2024-10-29 RX ORDER — HYDROXYZINE HYDROCHLORIDE 10 MG/1
10 TABLET, FILM COATED ORAL
Qty: 90 TABLET | Refills: 1 | Status: SHIPPED | OUTPATIENT
Start: 2024-10-29

## 2024-11-16 DIAGNOSIS — I34.0 MODERATE TO SEVERE MITRAL REGURGITATION: ICD-10-CM

## 2024-11-16 DIAGNOSIS — Z79.01 CURRENT USE OF LONG TERM ANTICOAGULATION: ICD-10-CM

## 2024-11-16 DIAGNOSIS — I48.0 PAROXYSMAL ATRIAL FIBRILLATION (HCC): ICD-10-CM

## 2024-11-16 DIAGNOSIS — I49.1 PAC (PREMATURE ATRIAL CONTRACTION): ICD-10-CM

## 2024-11-18 RX ORDER — METOPROLOL TARTRATE 25 MG/1
12.5 TABLET, FILM COATED ORAL EVERY 12 HOURS
Qty: 90 TABLET | Refills: 1 | Status: SHIPPED | OUTPATIENT
Start: 2024-11-18

## 2024-11-19 NOTE — TELEPHONE ENCOUNTER
Patient called stating that she needed this medication refilled. Informed her that the medication was sent to her pharmacy yesterday.

## 2024-12-02 DIAGNOSIS — I48.91 ATRIAL FIBRILLATION BY ELECTROCARDIOGRAM (HCC): ICD-10-CM

## 2024-12-02 DIAGNOSIS — I50.32 CHRONIC DIASTOLIC (CONGESTIVE) HEART FAILURE (HCC): ICD-10-CM

## 2024-12-02 DIAGNOSIS — I34.0 MODERATE TO SEVERE MITRAL REGURGITATION: ICD-10-CM

## 2024-12-04 RX ORDER — APIXABAN 2.5 MG/1
2.5 TABLET, FILM COATED ORAL 2 TIMES DAILY
Qty: 180 TABLET | Refills: 1 | Status: SHIPPED | OUTPATIENT
Start: 2024-12-04

## 2024-12-12 ENCOUNTER — OFFICE VISIT (OUTPATIENT)
Age: 84
End: 2024-12-12
Payer: MEDICARE

## 2024-12-12 VITALS
RESPIRATION RATE: 17 BRPM | HEIGHT: 60 IN | DIASTOLIC BLOOD PRESSURE: 70 MMHG | HEART RATE: 76 BPM | SYSTOLIC BLOOD PRESSURE: 139 MMHG | BODY MASS INDEX: 28.66 KG/M2 | WEIGHT: 146 LBS

## 2024-12-12 DIAGNOSIS — G89.29 CHRONIC PAIN OF RIGHT ANKLE: ICD-10-CM

## 2024-12-12 DIAGNOSIS — M77.41 METATARSALGIA OF BOTH FEET: Primary | ICD-10-CM

## 2024-12-12 DIAGNOSIS — B35.9 DERMATOPHYTOSIS: ICD-10-CM

## 2024-12-12 DIAGNOSIS — M25.571 CHRONIC PAIN OF RIGHT ANKLE: ICD-10-CM

## 2024-12-12 DIAGNOSIS — I70.209 PERIPHERAL ARTERIOSCLEROSIS (HCC): ICD-10-CM

## 2024-12-12 DIAGNOSIS — M54.16 RADICULOPATHY OF LUMBAR REGION: ICD-10-CM

## 2024-12-12 DIAGNOSIS — M77.42 METATARSALGIA OF BOTH FEET: Primary | ICD-10-CM

## 2024-12-12 DIAGNOSIS — B35.1 ONYCHOMYCOSIS: ICD-10-CM

## 2024-12-12 PROCEDURE — 99213 OFFICE O/P EST LOW 20 MIN: CPT | Performed by: PODIATRIST

## 2024-12-12 PROCEDURE — 20605 DRAIN/INJ JOINT/BURSA W/O US: CPT | Performed by: PODIATRIST

## 2024-12-12 RX ORDER — CLOTRIMAZOLE AND BETAMETHASONE DIPROPIONATE 10; .64 MG/G; MG/G
CREAM TOPICAL 2 TIMES DAILY
Qty: 45 G | Refills: 1 | Status: SHIPPED | OUTPATIENT
Start: 2024-12-12 | End: 2025-01-06

## 2024-12-12 RX ORDER — TRIAMCINOLONE ACETONIDE 40 MG/ML
20 INJECTION, SUSPENSION INTRA-ARTICULAR; INTRAMUSCULAR
Status: SHIPPED | OUTPATIENT
Start: 2024-12-12

## 2024-12-12 RX ADMIN — TRIAMCINOLONE ACETONIDE 20 MG: 40 INJECTION, SUSPENSION INTRA-ARTICULAR; INTRAMUSCULAR at 11:15

## 2024-12-12 NOTE — PROGRESS NOTES
"  Assessment/Plan: Metatarsalgia secondary to radiculopathy.  Pain upon ambulation.  Mycosis of nail.  Dermatophytosis.  Patient with peripheral artery disease.  Plantar fasciitis right foot.  Ongoing chronic pain right ankle.  Osteoarthritis.     Plan.  Chart reviewed.  PCP notes reviewed.  Lab work reviewed . patient examined.  Patient advised on condition.  At this time patient remain on Cymbalta as directed.  In addition she will use over-the-counter antifungal.  For xerosis, patient will use moisturizer after bathing.  Shoe size evaluated.  Shoe style recommended.  Aftercare instruction given.  Return for follow-up.    Chronic pain of right ankle, arthrocentesis done.    Medium joint arthrocentesis  Universal Protocol:  procedure performed by consultantConsent: Verbal consent obtained. Written consent not obtained.  Risks and benefits: risks, benefits and alternatives were discussed  Consent given by: patient  Time out: Immediately prior to procedure a \"time out\" was called to verify the correct patient, procedure, equipment, support staff and site/side marked as required.  Timeout called at: 12/12/2024 11:26 AM.  Patient understanding: patient states understanding of the procedure being performed  Patient identity confirmed: verbally with patient  Supporting Documentation  Indications: pain and joint swelling   Procedure Details  Location: ankle -   Preparation: Patient was prepped and draped in the usual sterile fashion  Needle size: 25 G  Ultrasound guidance: no  Approach: anterolateral  Medications administered: 20 mg triamcinolone acetonide 40 mg/mL    Patient tolerance: patient tolerated the procedure well with no immediate complications  Dressing:  Sterile dressing applied                       Diagnoses and all orders for this visit:     Metatarsalgia of both feet     Radiculopathy of lumbar region     Dermatophytosis     Onychomycosis     Peripheral arteriosclerosis     Plantar fasciitis right foot.   "          Subjective: Patient has pain in her feet.  She has low back pain.  She gets some relief from Cymbalta.  She also complains of ingrown toenails.  No history of trauma.  Patient gets chronic pain in the right lower extremity.  She has heel pain.  She has ankle pain.  No history of trauma.        Patient recently has had right heel pain.  She has pain upon rising.  No history of trauma.        No Known Allergies        Current Outpatient Medications:     amLODIPine (NORVASC) 10 mg tablet, Take 0.5 tablets (5 mg total) by mouth daily, Disp: 90 tablet, Rfl: 0    apixaban (Eliquis) 2.5 mg, Take 1 tablet (2.5 mg total) by mouth 2 (two) times a day, Disp: 180 tablet, Rfl: 3    atorvastatin (LIPITOR) 40 mg tablet, Take 1 tablet (40 mg total) by mouth every evening, Disp: 90 tablet, Rfl: 1    bumetanide (BUMEX) 2 mg tablet, Take 0.5 tablets (1 mg total) by mouth daily, Disp: , Rfl:     Diclofenac Sodium (VOLTAREN) 1 %, Apply 2 g topically in the morning APPLY NO MORE THAN 3 DAYS IN A ROW THEN TAKE A BREAK FOR ONE WEEK FROM USE., Disp: 100 g, Rfl: 1    levothyroxine 88 mcg tablet, TAKE 1 TABLET EVERY DAY, Disp: 90 tablet, Rfl: 1    losartan (COZAAR) 25 mg tablet, TAKE 2 TABLETS IN THE MORNING AND TAKE 1 TABLET IN THE EVENING, Disp: 270 tablet, Rfl: 10    metoprolol tartrate (LOPRESSOR) 25 mg tablet, Take 0.5 tablets (12.5 mg total) by mouth every 12 (twelve) hours, Disp: 45 tablet, Rfl: 3    pantoprazole (PROTONIX) 20 mg tablet, TAKE 1 TABLET EVERY DAY, Disp: 90 tablet, Rfl: 1    spironolactone (ALDACTONE) 25 mg tablet, Take 0.5 tablets (12.5 mg total) by mouth daily, Disp: 45 tablet, Rfl: 3    gabapentin (NEURONTIN) 100 mg capsule, Take 1 tablet at bedtime.  May increase to 2 tablets after 3 days (Patient not taking: Reported on 12/15/2023), Disp: 90 capsule, Rfl: 1    ketoconazole (NIZORAL) 2 % cream, Apply topically daily, Disp: 60 g, Rfl: 1    senna-docusate sodium (SENOKOT S) 8.6-50 mg per tablet, Take 1 tablet  by mouth daily (Patient not taking: Reported on 12/18/2023), Disp: 20 tablet, Rfl: 1           Patient Active Problem List   Diagnosis    Benign essential hypertension    Chronic diastolic (congestive) heart failure (HCC)    Hypothyroidism    Arthropathy of knee    Hallux abductovalgus with bunions, unspecified laterality    CKD (chronic kidney disease), stage III (HCC)    Diverticulitis of colon    Chronic gout without tophus    Hiatal hernia    Hyperlipemia    Hyperuricemia    Nephrolithiasis    WENDI (obstructive sleep apnea)    Pseudogout of left wrist    Gastroesophageal reflux disease without esophagitis    Exertional shortness of breath    Prediabetes    Status post right frontotemporal replacement cranioplasty    BMI 38.0-38.9,adult    Sciatica of left side    Spinal stenosis of lumbar region with neurogenic claudication    Cervical radiculopathy    Paroxysmal atrial fibrillation (HCC)    PAC (premature atrial contraction)             Patient ID: Rosalind Wise is a 84 y.o. female.     HPI     The following portions of the patient's history were reviewed and updated as appropriate:      family history includes Arthritis in her family and mother; Coronary artery disease in her mother; Diabetes in her brother, mother, and son; Hypertension in her father and mother.       reports that she has never smoked. She has never used smokeless tobacco. She reports current alcohol use. She reports that she does not use drugs.      Objective:  Patient's shoes and socks removed.   Foot ExamPhysical Exam       Physical Exam   Left Foot: Appearance: Normal except as noted: excessive pronation-- and-- pes planus.    Right Foot: Appearance: Normal except as noted: excessive pronation-- and-- pes planus. Tenderness: None except the calcaneous,-- medial calcaneous-- and-- insertion of the plantar fascia.  Patient has an enlarged hallux valgus deformity with inflamed bunion.  Left Ankle: Appearance: Normal except ecchymosis--  and-- swelling laterally. ROM: limited ROM in all planes    Right Ankle: ROM: limited ROM in all planes Motor: diffuse weakness.  Pain with palpation anterior lateral aspect right ankle joint mortise.    Neurological Exam: performed. Light touch was intact bilaterally. Vibratory sensation was intact bilaterally. Response to monofilament test was intact bilaterally. Deep tendon reflexes: patellar reflex present bilaterally-- and-- achilles reflex present bilaterally.    Vascular Exam: performed Dorsalis pedis pulses were 1/4 bilaterally. Posterior tibial pulses were 1/4 bilaterally. Elevation Pallor: diminished bilaterally. Capillary refill time was greater than 3 seconds bilaterally-- and-- Q9 findings bilateral. Negative digital hair noted. Positive abnormal cooling bilateral. Edema: moderate bilaterally-- and-- 6/7 pitting edema. Negative Homans sign.    Toenails: All of the toenails were elongated,-- hypertrophied,-- discolored-- and-- Mycotic with onychogryphosis. Note is made of bilateral tinea pedis in moccasin foot. Distribution.         Socks and shoes removed, Right Foot Findings: swollen, erythematous and dry.      The sensory exam showed diminished vibratory sensation at the level of the toes. Diminished tactile sensation with monofilament testing throughout the right foot.      Socks and shoes removed, Left Foot Findings: swollen, erythematous and dry.      The sensory exam showed diminished vibratory sensation at the level of the toes. Diminished tactile sensation with monofilament testing throughout the left foot.     Capillary refills findings on the right were delayed in the toes.      Pulses:      1+ in the posterior tibialis on the right      1+ in the dorsalis pedis on the right.      Capillary refills findings on the left were delayed in the toes.      Pulses:      1+ in the posterior tibialis on the left      1+ in the dorsalis pedis on the left.      Assign Risk Category: 2: Loss of protective  sensation with or without weakness, deformity, callus, pre-ulcer, or history of ulceration. High risk.    Hyperkeratosis: present on both first toes,-- present on both first sub metatarsals-- and-- Bilateral plantar moccasin tinea pedis noted.    Shoe Gear Evaluation: performed (). Recommendation(s): SAS style.

## 2024-12-13 DIAGNOSIS — E78.5 HYPERLIPIDEMIA, UNSPECIFIED HYPERLIPIDEMIA TYPE: ICD-10-CM

## 2024-12-13 RX ORDER — ATORVASTATIN CALCIUM 40 MG/1
40 TABLET, FILM COATED ORAL EVERY EVENING
Qty: 90 TABLET | Refills: 1 | Status: SHIPPED | OUTPATIENT
Start: 2024-12-13

## 2024-12-30 ENCOUNTER — RA CDI HCC (OUTPATIENT)
Dept: OTHER | Facility: HOSPITAL | Age: 84
End: 2024-12-30

## 2025-01-03 ENCOUNTER — APPOINTMENT (OUTPATIENT)
Dept: LAB | Facility: CLINIC | Age: 85
End: 2025-01-03
Payer: MEDICARE

## 2025-01-03 DIAGNOSIS — E03.9 ACQUIRED HYPOTHYROIDISM: Chronic | ICD-10-CM

## 2025-01-03 DIAGNOSIS — I10 BENIGN ESSENTIAL HYPERTENSION: ICD-10-CM

## 2025-01-03 DIAGNOSIS — I13.0 HYPERTENSIVE HEART AND CHRONIC KIDNEY DISEASE WITH HEART FAILURE AND STAGE 1 THROUGH STAGE 4 CHRONIC KIDNEY DISEASE, OR CHRONIC KIDNEY DISEASE (HCC): ICD-10-CM

## 2025-01-03 DIAGNOSIS — N25.81 SECONDARY HYPERPARATHYROIDISM (HCC): ICD-10-CM

## 2025-01-03 DIAGNOSIS — I48.0 PAROXYSMAL ATRIAL FIBRILLATION (HCC): ICD-10-CM

## 2025-01-03 DIAGNOSIS — E78.5 HYPERLIPIDEMIA, UNSPECIFIED HYPERLIPIDEMIA TYPE: ICD-10-CM

## 2025-01-03 DIAGNOSIS — E83.42 HYPOMAGNESEMIA: ICD-10-CM

## 2025-01-03 DIAGNOSIS — E78.2 MIXED HYPERLIPIDEMIA: ICD-10-CM

## 2025-01-03 DIAGNOSIS — I10 BENIGN ESSENTIAL HTN: ICD-10-CM

## 2025-01-03 DIAGNOSIS — I50.33 ACUTE ON CHRONIC DIASTOLIC CONGESTIVE HEART FAILURE (HCC): ICD-10-CM

## 2025-01-03 DIAGNOSIS — E79.0 HYPERURICEMIA: ICD-10-CM

## 2025-01-03 DIAGNOSIS — E03.9 HYPOTHYROIDISM, UNSPECIFIED TYPE: ICD-10-CM

## 2025-01-03 DIAGNOSIS — R73.03 PREDIABETES: ICD-10-CM

## 2025-01-03 DIAGNOSIS — N18.30 STAGE 3 CHRONIC KIDNEY DISEASE, UNSPECIFIED WHETHER STAGE 3A OR 3B CKD (HCC): ICD-10-CM

## 2025-01-03 LAB
ALBUMIN SERPL BCG-MCNC: 4 G/DL (ref 3.5–5)
ALP SERPL-CCNC: 84 U/L (ref 34–104)
ALT SERPL W P-5'-P-CCNC: 11 U/L (ref 7–52)
ANION GAP SERPL CALCULATED.3IONS-SCNC: 9 MMOL/L (ref 4–13)
AST SERPL W P-5'-P-CCNC: 16 U/L (ref 13–39)
BACTERIA UR QL AUTO: ABNORMAL /HPF
BILIRUB SERPL-MCNC: 0.9 MG/DL (ref 0.2–1)
BILIRUB UR QL STRIP: NEGATIVE
BUN SERPL-MCNC: 31 MG/DL (ref 5–25)
CALCIUM SERPL-MCNC: 9.5 MG/DL (ref 8.4–10.2)
CHLORIDE SERPL-SCNC: 104 MMOL/L (ref 96–108)
CHOLEST SERPL-MCNC: 115 MG/DL (ref ?–200)
CLARITY UR: CLEAR
CO2 SERPL-SCNC: 31 MMOL/L (ref 21–32)
COLOR UR: ABNORMAL
CREAT SERPL-MCNC: 1.17 MG/DL (ref 0.6–1.3)
CREAT UR-MCNC: 136.7 MG/DL
ERYTHROCYTE [DISTWIDTH] IN BLOOD BY AUTOMATED COUNT: 15.1 % (ref 11.6–15.1)
EST. AVERAGE GLUCOSE BLD GHB EST-MCNC: 120 MG/DL
GFR SERPL CREATININE-BSD FRML MDRD: 42 ML/MIN/1.73SQ M
GLUCOSE P FAST SERPL-MCNC: 82 MG/DL (ref 65–99)
GLUCOSE UR STRIP-MCNC: NEGATIVE MG/DL
HBA1C MFR BLD: 5.8 %
HCT VFR BLD AUTO: 38.5 % (ref 34.8–46.1)
HDLC SERPL-MCNC: 56 MG/DL
HGB BLD-MCNC: 12.1 G/DL (ref 11.5–15.4)
HGB UR QL STRIP.AUTO: ABNORMAL
KETONES UR STRIP-MCNC: NEGATIVE MG/DL
LDLC SERPL CALC-MCNC: 45 MG/DL (ref 0–100)
LEUKOCYTE ESTERASE UR QL STRIP: ABNORMAL
MAGNESIUM SERPL-MCNC: 2.1 MG/DL (ref 1.9–2.7)
MCH RBC QN AUTO: 28.1 PG (ref 26.8–34.3)
MCHC RBC AUTO-ENTMCNC: 31.4 G/DL (ref 31.4–37.4)
MCV RBC AUTO: 90 FL (ref 82–98)
MICROALBUMIN UR-MCNC: 9.1 MG/L
MICROALBUMIN/CREAT 24H UR: 7 MG/G CREATININE (ref 0–30)
NITRITE UR QL STRIP: NEGATIVE
NON-SQ EPI CELLS URNS QL MICRO: ABNORMAL /HPF
NONHDLC SERPL-MCNC: 59 MG/DL
PH UR STRIP.AUTO: 6 [PH]
PHOSPHATE SERPL-MCNC: 3.2 MG/DL (ref 2.3–4.1)
PLATELET # BLD AUTO: 164 THOUSANDS/UL (ref 149–390)
PMV BLD AUTO: 11.1 FL (ref 8.9–12.7)
POTASSIUM SERPL-SCNC: 4.2 MMOL/L (ref 3.5–5.3)
PROT SERPL-MCNC: 6.7 G/DL (ref 6.4–8.4)
PROT UR STRIP-MCNC: ABNORMAL MG/DL
PTH-INTACT SERPL-MCNC: 76 PG/ML (ref 12–88)
RBC # BLD AUTO: 4.3 MILLION/UL (ref 3.81–5.12)
RBC #/AREA URNS AUTO: ABNORMAL /HPF
SODIUM SERPL-SCNC: 144 MMOL/L (ref 135–147)
SP GR UR STRIP.AUTO: 1.02 (ref 1–1.03)
TRIGL SERPL-MCNC: 72 MG/DL (ref ?–150)
TSH SERPL DL<=0.05 MIU/L-ACNC: 1.4 UIU/ML (ref 0.45–4.5)
UROBILINOGEN UR STRIP-ACNC: <2 MG/DL
WBC # BLD AUTO: 5.02 THOUSAND/UL (ref 4.31–10.16)
WBC #/AREA URNS AUTO: ABNORMAL /HPF

## 2025-01-03 PROCEDURE — 85027 COMPLETE CBC AUTOMATED: CPT

## 2025-01-03 PROCEDURE — 82043 UR ALBUMIN QUANTITATIVE: CPT

## 2025-01-03 PROCEDURE — 83036 HEMOGLOBIN GLYCOSYLATED A1C: CPT

## 2025-01-03 PROCEDURE — 82570 ASSAY OF URINE CREATININE: CPT

## 2025-01-03 PROCEDURE — 83735 ASSAY OF MAGNESIUM: CPT

## 2025-01-03 PROCEDURE — 80061 LIPID PANEL: CPT

## 2025-01-03 PROCEDURE — 84100 ASSAY OF PHOSPHORUS: CPT

## 2025-01-03 PROCEDURE — 83970 ASSAY OF PARATHORMONE: CPT

## 2025-01-03 PROCEDURE — 36415 COLL VENOUS BLD VENIPUNCTURE: CPT

## 2025-01-03 PROCEDURE — 81001 URINALYSIS AUTO W/SCOPE: CPT

## 2025-01-03 PROCEDURE — 80053 COMPREHEN METABOLIC PANEL: CPT

## 2025-01-03 PROCEDURE — 84443 ASSAY THYROID STIM HORMONE: CPT

## 2025-01-04 ENCOUNTER — RESULTS FOLLOW-UP (OUTPATIENT)
Dept: FAMILY MEDICINE CLINIC | Facility: CLINIC | Age: 85
End: 2025-01-04

## 2025-01-06 ENCOUNTER — OFFICE VISIT (OUTPATIENT)
Dept: FAMILY MEDICINE CLINIC | Facility: CLINIC | Age: 85
End: 2025-01-06
Payer: MEDICARE

## 2025-01-06 VITALS
WEIGHT: 144 LBS | HEIGHT: 60 IN | HEART RATE: 66 BPM | BODY MASS INDEX: 28.27 KG/M2 | DIASTOLIC BLOOD PRESSURE: 68 MMHG | OXYGEN SATURATION: 97 % | TEMPERATURE: 98.2 F | SYSTOLIC BLOOD PRESSURE: 118 MMHG

## 2025-01-06 DIAGNOSIS — F33.1 MODERATE EPISODE OF RECURRENT MAJOR DEPRESSIVE DISORDER (HCC): ICD-10-CM

## 2025-01-06 DIAGNOSIS — I49.1 PAC (PREMATURE ATRIAL CONTRACTION): ICD-10-CM

## 2025-01-06 DIAGNOSIS — N18.30 STAGE 3 CHRONIC KIDNEY DISEASE, UNSPECIFIED WHETHER STAGE 3A OR 3B CKD (HCC): ICD-10-CM

## 2025-01-06 DIAGNOSIS — G47.09 OTHER INSOMNIA: ICD-10-CM

## 2025-01-06 DIAGNOSIS — E78.5 HYPERLIPIDEMIA, UNSPECIFIED HYPERLIPIDEMIA TYPE: ICD-10-CM

## 2025-01-06 DIAGNOSIS — I70.209 PERIPHERAL ARTERIOSCLEROSIS (HCC): ICD-10-CM

## 2025-01-06 DIAGNOSIS — I48.0 PAROXYSMAL ATRIAL FIBRILLATION (HCC): ICD-10-CM

## 2025-01-06 DIAGNOSIS — I50.33 ACUTE ON CHRONIC DIASTOLIC CONGESTIVE HEART FAILURE (HCC): ICD-10-CM

## 2025-01-06 DIAGNOSIS — R73.03 PREDIABETES: ICD-10-CM

## 2025-01-06 DIAGNOSIS — J01.10 ACUTE NON-RECURRENT FRONTAL SINUSITIS: ICD-10-CM

## 2025-01-06 DIAGNOSIS — E03.9 HYPOTHYROIDISM, UNSPECIFIED TYPE: Primary | Chronic | ICD-10-CM

## 2025-01-06 DIAGNOSIS — I10 BENIGN ESSENTIAL HYPERTENSION: ICD-10-CM

## 2025-01-06 PROBLEM — N25.81 SECONDARY HYPERPARATHYROIDISM (HCC): Status: ACTIVE | Noted: 2025-01-06

## 2025-01-06 PROCEDURE — G2211 COMPLEX E/M VISIT ADD ON: HCPCS | Performed by: FAMILY MEDICINE

## 2025-01-06 PROCEDURE — 99215 OFFICE O/P EST HI 40 MIN: CPT | Performed by: FAMILY MEDICINE

## 2025-01-06 RX ORDER — PANTOPRAZOLE SODIUM 20 MG/1
20 TABLET, DELAYED RELEASE ORAL DAILY
Qty: 90 TABLET | Refills: 1 | Status: SHIPPED | OUTPATIENT
Start: 2025-01-06

## 2025-01-06 RX ORDER — HYDROXYZINE HYDROCHLORIDE 10 MG/1
10 TABLET, FILM COATED ORAL
Qty: 90 TABLET | Refills: 1 | Status: SHIPPED | OUTPATIENT
Start: 2025-01-06

## 2025-01-06 RX ORDER — AMOXICILLIN 875 MG/1
875 TABLET, COATED ORAL 2 TIMES DAILY
Qty: 14 TABLET | Refills: 0 | Status: SHIPPED | OUTPATIENT
Start: 2025-01-06 | End: 2025-01-13

## 2025-01-06 RX ORDER — LEVOTHYROXINE SODIUM 88 UG/1
88 TABLET ORAL DAILY
Qty: 90 TABLET | Refills: 1 | Status: SHIPPED | OUTPATIENT
Start: 2025-01-06

## 2025-01-06 NOTE — PROGRESS NOTES
Subjective:      Patient ID: Rosalind Wise is a 84 y.o. female.    HPI    Patient is following up on her chronic medical problems.  Has history of hypertension, dyslipidemia, prediabetes, CKD, CHF, A-fib.   Denies any symptoms of chest pain or shortness of breath.  Lives with her son and daughter-in-law.  Metabolic labs reviewed with patient today.  Her fasting blood sugar, cholesterol is at goal, A1c 5.8.    Denies any chest pain/ sob.   Tolerating all medications well.  Denies any symptoms of depression.  Has chronic insomnia, well-controlled on Atarax 10 mg.  Patient requesting medication refills.  Patient reports symptoms of sore throat and runny nose.  Denies any fever or chills.    Past Medical History:   Diagnosis Date    Anxiety     Arthritis     joints    Bunion     Cataract     Disease of thyroid gland     Eczema     GERD (gastroesophageal reflux disease)     Gout     Heart failure (HCC)     Hepatitis     Hiatal hernia     Hypertension     Nephrolithiasis 2017    Onychomycosis     last assessed: 16    Orthopnea     resolved: 16    Pseudogout of left wrist     Sleep apnea     wears CPAP    Stroke (HCC)        Family History   Problem Relation Age of Onset    Diabetes Mother     Coronary artery disease Mother     Arthritis Mother     Hypertension Mother     Hypertension Father     Diabetes Brother     Diabetes Son     Arthritis Family        Past Surgical History:   Procedure Laterality Date    ABDOMINAL SURGERY          BRAIN HEMATOMA EVACUATION Right 2020    Procedure: Right decompressive craniectomy and evacuation of intraparenchymal hematoma;  Surgeon: Apolinar Herrera MD;  Location: [facility];  Service: Neurosurgery    BREAST SURGERY Right     cyst removal    CARPAL TUNNEL RELEASE Left     CARPAL TUNNEL RELEASE Right 2004    CATARACT EXTRACTION       SECTION  1960    x1    COLONOSCOPY N/A 2018    Procedure: COLONOSCOPY;  Surgeon: Alejandro Carlson MD;   Location: [facility] GI [facility];  Service: Gastroenterology    DILATION AND CURETTAGE OF UTERUS  1967    EYE SURGERY Left 2006    cataracts    HERNIA REPAIR      umbilical hernia    INCISIONAL HERNIA REPAIR      incarcerated    KNEE ARTHROSCOPY Right     [facility] RPLCMT BONE FLAP/PROSTHETIC PLATE SKULL Right 4/6/2020    Procedure: right frontotemporal replacement cranioplasty;  Surgeon: Apolinar Herrera MD;  Location:  MAIN OR;  Service: Neurosurgery    VA XCAPSL CTRC RMVL INSJ IO LENS PROSTH W/O ECP Right 3/27/2017    Procedure: EXTRACTION EXTRACAPSULAR CATARACT PHACO INTRAOCULAR LENS (IOL);  Surgeon: Trip Gilbert MD;  Location: [facility] MAIN OR;  Service: Ophthalmology        reports that she has never smoked. She has never used smokeless tobacco. She reports current alcohol use. She reports that she does not use drugs.      Current Outpatient Medications:     apixaban (Eliquis) 2.5 mg, TAKE 1 TABLET TWICE DAILY, Disp: 180 tablet, Rfl: 1    atorvastatin (LIPITOR) 40 mg tablet, Take 1 tablet (40 mg total) by mouth every evening, Disp: 90 tablet, Rfl: 1    bumetanide (BUMEX) 1 mg tablet, Take 1 tablet (1 mg total) by mouth daily, Disp: 90 tablet, Rfl: 3    Cholecalciferol (Vitamin D3) 50 MCG CAPS, Take by mouth in the morning, Disp: , Rfl:     clotrimazole-betamethasone (LOTRISONE) 1-0.05 % cream, Apply topically 2 (two) times a day for 25 days, Disp: 45 g, Rfl: 1    Diclofenac Sodium (VOLTAREN) 1 %, Apply 2 g topically in the morning APPLY NO MORE THAN 3 DAYS IN A ROW THEN TAKE A BREAK FOR ONE WEEK FROM USE., Disp: 100 g, Rfl: 1    hydrOXYzine HCL (ATARAX) 10 mg tablet, Take 1 tablet (10 mg total) by mouth daily at bedtime as needed (insomnia), Disp: 90 tablet, Rfl: 1    levothyroxine 88 mcg tablet, Take 1 tablet (88 mcg total) by mouth daily, Disp: 90 tablet, Rfl: 1    losartan (COZAAR) 25 mg tablet, TAKE 2 TABLETS IN THE MORNING AND TAKE 1 TABLET IN THE EVENING, Disp: 270 tablet, Rfl: 1    Magnesium 400 MG CAPS, Take by  mouth in the morning Take 500 mg. BID, Disp: , Rfl:     metoprolol tartrate (LOPRESSOR) 25 mg tablet, Take 0.5 tablets (12.5 mg total) by mouth every 12 (twelve) hours, Disp: 90 tablet, Rfl: 1    pantoprazole (PROTONIX) 20 mg tablet, Take 1 tablet (20 mg total) by mouth daily, Disp: 90 tablet, Rfl: 1    spironolactone (ALDACTONE) 25 mg tablet, Take 0.5 tablets (12.5 mg total) by mouth daily, Disp: 45 tablet, Rfl: 3    Current Facility-Administered Medications:     triamcinolone acetonide (KENALOG-40) 40 mg/mL injection 20 mg, 20 mg, Infiltration, , , 20 mg at 10/03/24 1045    triamcinolone acetonide (Kenalog-40) 40 mg/mL injection 20 mg, 20 mg, Intra-articular, , , 20 mg at 12/12/24 1115    The following portions of the patient's history were reviewed and updated as appropriate: allergies, current medications, past family history, past medical history, past social history, past surgical history and problem list.    Review of Systems   Respiratory: Negative.     Cardiovascular: Negative.            Objective:    /68   Pulse 66   Temp 98.2 °F (36.8 °C)   Ht 5' (1.524 m)   Wt 65.3 kg (144 lb)   SpO2 97%   BMI 28.12 kg/m²      Physical Exam  Cardiovascular:      Heart sounds: Normal heart sounds.   Pulmonary:      Breath sounds: Normal breath sounds.           Recent Results (from the past 6 weeks)   Comprehensive metabolic panel    Collection Time: 01/03/25  8:13 AM   Result Value Ref Range    Sodium 144 135 - 147 mmol/L    Potassium 4.2 3.5 - 5.3 mmol/L    Chloride 104 96 - 108 mmol/L    CO2 31 21 - 32 mmol/L    ANION GAP 9 4 - 13 mmol/L    BUN 31 (H) 5 - 25 mg/dL    Creatinine 1.17 0.60 - 1.30 mg/dL    Glucose, Fasting 82 65 - 99 mg/dL    Calcium 9.5 8.4 - 10.2 mg/dL    AST 16 13 - 39 U/L    ALT 11 7 - 52 U/L    Alkaline Phosphatase 84 34 - 104 U/L    Total Protein 6.7 6.4 - 8.4 g/dL    Albumin 4.0 3.5 - 5.0 g/dL    Total Bilirubin 0.90 0.20 - 1.00 mg/dL    eGFR 42 ml/min/1.73sq m   Hemoglobin A1C     Collection Time: 01/03/25  8:13 AM   Result Value Ref Range    Hemoglobin A1C 5.8 (H) Normal 4.0-5.6%; PreDiabetic 5.7-6.4%; Diabetic >=6.5%; Glycemic control for adults with diabetes <7.0% %     mg/dl   Lipid panel    Collection Time: 01/03/25  8:13 AM   Result Value Ref Range    Cholesterol 115 See Comment mg/dL    Triglycerides 72 See Comment mg/dL    HDL, Direct 56 >=50 mg/dL    LDL Calculated 45 0 - 100 mg/dL    Non-HDL-Chol (CHOL-HDL) 59 mg/dl   TSH, 3rd generation with Free T4 reflex    Collection Time: 01/03/25  8:13 AM   Result Value Ref Range    TSH 3RD GENERATON 1.395 0.450 - 4.500 uIU/mL   Magnesium    Collection Time: 01/03/25  8:13 AM   Result Value Ref Range    Magnesium 2.1 1.9 - 2.7 mg/dL   Phosphorus    Collection Time: 01/03/25  8:13 AM   Result Value Ref Range    Phosphorus 3.2 2.3 - 4.1 mg/dL   PTH, intact    Collection Time: 01/03/25  8:13 AM   Result Value Ref Range    PTH 76.0 12.0 - 88.0 pg/mL   Albumin / creatinine urine ratio    Collection Time: 01/03/25  8:13 AM   Result Value Ref Range    Creatinine, Ur 136.7 Reference range not established. mg/dL    Albumin,U,Random 9.1 <20.0 mg/L    Albumin Creat Ratio 7 0 - 30 mg/g creatinine   CBC    Collection Time: 01/03/25  8:13 AM   Result Value Ref Range    WBC 5.02 4.31 - 10.16 Thousand/uL    RBC 4.30 3.81 - 5.12 Million/uL    Hemoglobin 12.1 11.5 - 15.4 g/dL    Hematocrit 38.5 34.8 - 46.1 %    MCV 90 82 - 98 fL    MCH 28.1 26.8 - 34.3 pg    MCHC 31.4 31.4 - 37.4 g/dL    RDW 15.1 11.6 - 15.1 %    Platelets 164 149 - 390 Thousands/uL    MPV 11.1 8.9 - 12.7 fL   Urinalysis with microscopic    Collection Time: 01/03/25  8:13 AM   Result Value Ref Range    Color, UA Light Yellow     Clarity, UA Clear     Specific Gravity, UA 1.020 1.003 - 1.030    pH, UA 6.0 4.5, 5.0, 5.5, 6.0, 6.5, 7.0, 7.5, 8.0    Leukocytes, UA Trace (A) Negative    Nitrite, UA Negative Negative    Protein, UA Trace (A) Negative mg/dl    Glucose, UA Negative Negative  mg/dl    Ketones, UA Negative Negative mg/dl    Urobilinogen, UA <2.0 <2.0 mg/dl mg/dl    Bilirubin, UA Negative Negative    Occult Blood, UA Trace (A) Negative    RBC, UA 1-2 None Seen, 1-2 /hpf    WBC, UA 4-10 (A) None Seen, 1-2 /hpf    Epithelial Cells Occasional None Seen, Occasional /hpf    Bacteria, UA None Seen None Seen, Occasional /hpf       Assessment/Plan:    No problem-specific Assessment & Plan notes found for this encounter.           Problem List Items Addressed This Visit          Cardiovascular and Mediastinum    Benign essential hypertension    Continue losartan 25 mg per nephrology         Acute on chronic diastolic congestive heart failure (HCC)    Wt Readings from Last 3 Encounters:   01/06/25 65.3 kg (144 lb)   12/12/24 66.2 kg (146 lb)   10/15/24 66.2 kg (146 lb)     Stable. Continue spironolactone, losartan, bumetanide per cardiology.                Peripheral arteriosclerosis (HCC)    Paroxysmal atrial fibrillation (HCC)    Rate controlled on metoprolol.  Continue same.         Relevant Medications    pantoprazole (PROTONIX) 20 mg tablet       Respiratory    Acute non-recurrent frontal sinusitis    Relevant Medications    amoxicillin (AMOXIL) 875 mg tablet       Endocrine    Hypothyroidism - Primary (Chronic)    TSH stable.  Continue levothyroxine 88 mcg          Relevant Medications    levothyroxine 88 mcg tablet    Other Relevant Orders    TSH, 3rd generation with Free T4 reflex       Genitourinary    CKD (chronic kidney disease), stage III (HCC) (Chronic)    Lab Results   Component Value Date    EGFR 42 01/03/2025    EGFR 36 10/08/2024    EGFR 46 07/03/2024    CREATININE 1.17 01/03/2025    CREATININE 1.34 (H) 10/08/2024    CREATININE 1.09 07/03/2024   Renal function is stable.  Continue to avoid nephrotoxins.  Continue monitoring per nephrology.            Behavioral Health    Depression    Patient states that she is not depressed anymore.  Prefers not to be on any medication at this  point         Relevant Medications    hydrOXYzine HCL (ATARAX) 10 mg tablet    levothyroxine 88 mcg tablet       Neurology/Sleep    Other insomnia    Symptoms well-controlled on Atarax 10 mg nightly.  Continue same.  Patient/family aware of the potential side effects of the medication.  Patient puts 7 pills at a time in her pillbox.          Relevant Medications    hydrOXYzine HCL (ATARAX) 10 mg tablet       Other    Hyperlipemia    LDL is at goal.  Continue atorvastatin 40 mg.  Metabolic labs/follow-up 6 month interval         Relevant Orders    Lipid panel    Prediabetes    Reviewed with patient today.  A1c stable at 6.0  Encouraged to continue with low carb diet.  Continue monitoring. like         Relevant Orders    Comprehensive metabolic panel    Hemoglobin A1C     Other Visit Diagnoses         PAC (premature atrial contraction)        Relevant Medications    pantoprazole (PROTONIX) 20 mg tablet         I have spent a total time of 40 minutes in caring for this patient on the day of the visit/encounter including Diagnostic results, Prognosis, Risks and benefits of tx options, Instructions for management, Patient and family education, Importance of tx compliance, Risk factor reductions, Impressions, Counseling / Coordination of care, and Documenting in the medical record.

## 2025-01-06 NOTE — ASSESSMENT & PLAN NOTE
Wt Readings from Last 3 Encounters:   01/06/25 65.3 kg (144 lb)   12/12/24 66.2 kg (146 lb)   10/15/24 66.2 kg (146 lb)     Stable. Continue spironolactone, losartan, bumetanide per cardiology.

## 2025-01-06 NOTE — ASSESSMENT & PLAN NOTE
Reviewed with patient today.  A1c stable at 6.0  Encouraged to continue with low carb diet.  Continue monitoring. like

## 2025-01-06 NOTE — ASSESSMENT & PLAN NOTE
Lab Results   Component Value Date    EGFR 42 01/03/2025    EGFR 36 10/08/2024    EGFR 46 07/03/2024    CREATININE 1.17 01/03/2025    CREATININE 1.34 (H) 10/08/2024    CREATININE 1.09 07/03/2024   Renal function is stable.  Continue to avoid nephrotoxins.  Continue monitoring per nephrology.

## 2025-01-07 ENCOUNTER — TELEPHONE (OUTPATIENT)
Dept: FAMILY MEDICINE CLINIC | Facility: CLINIC | Age: 85
End: 2025-01-07

## 2025-01-17 DIAGNOSIS — I10 BENIGN ESSENTIAL HTN: ICD-10-CM

## 2025-01-20 RX ORDER — SPIRONOLACTONE 25 MG/1
12.5 TABLET ORAL DAILY
Qty: 45 TABLET | Refills: 1 | Status: SHIPPED | OUTPATIENT
Start: 2025-01-20

## 2025-01-30 ENCOUNTER — OFFICE VISIT (OUTPATIENT)
Dept: CARDIOLOGY CLINIC | Facility: CLINIC | Age: 85
End: 2025-01-30
Payer: MEDICARE

## 2025-01-30 VITALS
BODY MASS INDEX: 29.84 KG/M2 | WEIGHT: 152 LBS | SYSTOLIC BLOOD PRESSURE: 140 MMHG | OXYGEN SATURATION: 100 % | DIASTOLIC BLOOD PRESSURE: 68 MMHG | HEART RATE: 92 BPM | HEIGHT: 60 IN

## 2025-01-30 DIAGNOSIS — I49.1 PAC (PREMATURE ATRIAL CONTRACTION): ICD-10-CM

## 2025-01-30 DIAGNOSIS — I48.0 PAROXYSMAL ATRIAL FIBRILLATION (HCC): ICD-10-CM

## 2025-01-30 DIAGNOSIS — I48.91 ATRIAL FIBRILLATION WITH RVR (HCC): ICD-10-CM

## 2025-01-30 DIAGNOSIS — I50.32 CHRONIC DIASTOLIC (CONGESTIVE) HEART FAILURE (HCC): ICD-10-CM

## 2025-01-30 DIAGNOSIS — Z79.01 CURRENT USE OF LONG TERM ANTICOAGULATION: ICD-10-CM

## 2025-01-30 DIAGNOSIS — I34.0 MODERATE TO SEVERE MITRAL REGURGITATION: ICD-10-CM

## 2025-01-30 DIAGNOSIS — R06.02 EXERTIONAL SHORTNESS OF BREATH: Primary | ICD-10-CM

## 2025-01-30 DIAGNOSIS — I10 BENIGN ESSENTIAL HYPERTENSION: ICD-10-CM

## 2025-01-30 PROCEDURE — 99214 OFFICE O/P EST MOD 30 MIN: CPT | Performed by: INTERNAL MEDICINE

## 2025-01-30 PROCEDURE — 93000 ELECTROCARDIOGRAM COMPLETE: CPT | Performed by: INTERNAL MEDICINE

## 2025-01-30 RX ORDER — METOPROLOL TARTRATE 25 MG/1
12.5 TABLET, FILM COATED ORAL EVERY 12 HOURS
Qty: 90 TABLET | Refills: 1 | Status: SHIPPED | OUTPATIENT
Start: 2025-01-30

## 2025-01-30 NOTE — PROGRESS NOTES
Cardiology Follow Up  Rosalind Wise  1940  [id number]  [facility] PROFESSIONAL [facility] [address] [id number]    1. Exertional shortness of breath  Empagliflozin (Jardiance) 10 MG TABS tablet    NM myocardial perfusion spect (rx stress and/or rest)      2. Chronic diastolic (congestive) heart failure (HCC)  POCT ECG    Empagliflozin (Jardiance) 10 MG TABS tablet    NM myocardial perfusion spect (rx stress and/or rest)      3. Paroxysmal atrial fibrillation (HCC)  POCT ECG    Empagliflozin (Jardiance) 10 MG TABS tablet    NM myocardial perfusion spect (rx stress and/or rest)      4. PAC (premature atrial contraction)  POCT ECG      5. Benign essential hypertension  POCT ECG      6. Atrial fibrillation with RVR (HCC)  POCT ECG         Discussion/Plan:  Exertional shortness of breath/fatigue/chest pain-she is compliant with her diuretic.  She has an 8 pound weight gain compared to her prior evaluation.  She is currently taking bumetanide 1 mg daily.  Spironolactone 12.5 mg daily.  She does not want to increase her loop diuretic.  We will add on low-dose SGLT2.  Component of her shortness of breath may be secondary to severe pulmonary hypertension.  Plan for nuclear stress test to rule out ischemia. Monitor weights and breathing. Patient will inform if with continued weight gain/change in symptoms.    Atrial fibrillation persistent-  Continue metoprolol 12.5 mg twice a day.  Eliquis 2.5 mg.      Moderate to severe mitral regurgitation- blood pressure optimized.  Continue amlodipine 10 mg. Losartan 50 mg morning and 50mgh in evening. Bumex 1mg daily.  We discussed about tight blood pressure control.   She will continue with a low-salt diet.  Her blood pressures have been optimized.  Possible consideration for MitraClip evaluation.   IF worsens or symptoms, plan to consider mitral clip. Recheck      Intraparenchymal bleed/cranioplasty- previous history which makes her at higher risk for anticoagulation usage.    Acute on chronic diastolic heart failure with a component of lymphedema- improved volume status. Lower ext better  - Weight loss  - Compression socks + wedge pillow  - Increase bumex 1mg daily +  - Low-salt diet   - Elevation of legs + walking  -- if with continued lower extremity edema consideration for compression boots    Sleep apnea  -- not wearing cpap    Incomplete rbbb    Acute on chronic kidney disease follow-up with renal    Triglycerides- controlled  - 4000 mg omega-3 daily      Interval History:  She denies having chest pain. Her edema is better. Taking omega 3 currently. No bleeding or bruising.  No dizziness or light-headness. Blood pressure very well controlled. Has back pain.    12/11/2018:  She reports having some exertional shortness of breath.  Her lower extremity edema has improved but is persistent.  She denies feeling dizziness or lightheadedness.  She denies having any falls.  She denies having any bleeding or bruising.    06/12/2019:  She reports some improvement in her shortness of breath but still has dyspnea while sitting.  She is unable to walk secondary to severe back pain.  She denies feeling dizziness or lightheadedness.  Her lower extremity edema has improved.  She is trying a elevator legs.  She does wear compression socks.  She is compliant with CPAP.    4/1/20: 79-year-old with recent intraparenchymal hemorrhage status post right decompressive craniectomy and evacuation of intraparenchymal hematoma.  She is pending follow-up bone flap by neuro surgery.  She tolerated her previous procedure without evidence of decompensated heart failure.  She was noted to have some bradycardia and her beta-blocker was discontinued.  She is compliant with her antihypertensive medications.  She is compliant with her diuretics.  She denies having significant worsening in her chronic  dyspnea or lower extremity edema    02/24/2021:  She denies currently feeling significant shortness of breath.  She reports her lower extremity swelling is well controlled.  We reviewed through her recent EKG.  No prior history of atrial fibrillation.  She is compliant with her medications.  Denies having any falls.    06/07/2021:  Her weight has been stable.  She denies having major lower extremity swelling.  We reviewed through her last echocardiogram.  Reviewed through her Holter monitor which did not show evidence of atrial fibrillation or atrial flutter.  She is compliant with her CPAP.  She is compliant with her diuretics.  Reviewed her last lab work.    01/21/2022:  Her blood pressures have been controlled.  She denies having major shortness of breath.  She denies having chest heaviness.  We reviewed through her recent echocardiogram.  No major change.    05/27/2022:  She had a mechanical fall 2 weeks ago.  She is now seen in atrial fibrillation newly recognized.  Her last event monitor from last year was negative for atrial fibrillation.  She denies having major palpitations.  She has chronic shortness of breath.  She states her weight has been stable on her lower extremity swelling has been also stable.  She has been compliant with her diuretics.  She denies feeling dizziness.    06/21/2022:  She denies having recurrence of fall.  She still has some exertional dyspnea.  However overall she is feeling well.  We reviewed her event monitor.  S she is awaiting CPAP mask titration    08/04/2022:  She denies having major bleeding.  She is wearing her CPAP at night.  She denies having any falls.  We reviewed through her event monitor.    12/19/2022: She is compliant with her CPAP.  Her breathing has been stable.  She denies major bleeding.  She states lower extremity swelling has been stable.    4/24/2023: Denies having dizziness.  Denies any major falls.  No major bleeding.  She is in controlled atrial  fibrillation her heart rate is in the 70s.  Her blood pressure is very well controlled.  Denies major change in volume.  Denies PND orthopnea.    10/27/2023: She is wearing her CPAP.  She denies major palpitations.  Denies lower extremity swelling we reviewed her echocardiogram    Recent visit: She reports having episodes of chest tightness.  She has also increased shortness of breath.  She she has had some weight gain.  However no lower extremity swelling.  She is not able to tolerate CPAP.  She is taking her medications.  She is accompanied with her daughter-in-law.    Patient Active Problem List   Diagnosis    Benign essential hypertension    Chronic diastolic (congestive) heart failure (HCC)    Hypothyroidism    Acute on chronic diastolic congestive heart failure (HCC)    Arthropathy of knee    Hallux abductovalgus with bunions, unspecified laterality    CKD (chronic kidney disease), stage III (HCC)    Diverticulitis of colon    Chronic gout without tophus    Hiatal hernia    Hyperlipemia    Hyperuricemia    WENDI (obstructive sleep apnea)    Pseudogout of left wrist    Gastroesophageal reflux disease without esophagitis    Peripheral arteriosclerosis (HCC)    Prediabetes    Status post right frontotemporal replacement cranioplasty    Sciatica of left side    Spinal stenosis of lumbar region with neurogenic claudication    Cervical radiculopathy    Paroxysmal atrial fibrillation (HCC)    Intentional overdose (HCC)    Atrial fibrillation with RVR (HCC)    Hypomagnesemia    Depression    Other insomnia    Hypertensive heart and chronic kidney disease with heart failure and stage 1 through stage 4 chronic kidney disease, or chronic kidney disease (HCC)    Secondary hyperparathyroidism (HCC)    Acute non-recurrent frontal sinusitis     Past Medical History:   Diagnosis Date    Anxiety     Arthritis     joints    Bunion     Cataract     Disease of thyroid gland     Eczema     GERD (gastroesophageal reflux disease)      Gout     Heart failure (HCC)     Hepatitis     Hiatal hernia     Hypertension     Nephrolithiasis 06/21/2017    Onychomycosis     last assessed: 4/29/16    Orthopnea     resolved: 1/8/16    Pseudogout of left wrist     Sleep apnea     wears CPAP    Stroke (HCC)      Social History     Socioeconomic History    Marital status:      Spouse name: Not on file    Number of children: Not on file    Years of education: Not on file    Highest education level: Not on file   Occupational History    Not on file   Tobacco Use    Smoking status: Never    Smokeless tobacco: Never   Vaping Use    Vaping status: Never Used   Substance and Sexual Activity    Alcohol use: Yes     Comment: Only on special occasions    Drug use: Never    Sexual activity: Not Currently     Partners: Male     Birth control/protection: Post-menopausal   Other Topics Concern    Not on file   Social History Narrative    Daily coffee consumption    Lack of exercise    Sleeps 6-7 hours a day     Social Drivers of Health     Financial Resource Strain: Low Risk  (7/5/2023)    Overall Financial Resource Strain (CARDIA)     Difficulty of Paying Living Expenses: Not hard at all   Food Insecurity: No Food Insecurity (7/8/2024)    Nursing - Inadequate Food Risk Classification     Worried About Running Out of Food in the Last Year: Never true     Ran Out of Food in the Last Year: Never true     Ran Out of Food in the Last Year: Not on file   Transportation Needs: No Transportation Needs (7/8/2024)    PRAPARE - Transportation     Lack of Transportation (Medical): No     Lack of Transportation (Non-Medical): No   Physical Activity: Not on file   Stress: Not on file   Social Connections: Not on file   Intimate Partner Violence: Not At Risk (3/27/2024)    Humiliation, Afraid, Rape, and Kick questionnaire     Fear of Current or Ex-Partner: No     Emotionally Abused: No     Physically Abused: No     Sexually Abused: No   Housing Stability: Low Risk  (7/8/2024)     Housing Stability Vital Sign     Unable to Pay for Housing in the Last Year: No     Number of Times Moved in the Last Year: 0     Homeless in the Last Year: No      Family History   Problem Relation Age of Onset    Diabetes Mother     Coronary artery disease Mother     Arthritis Mother     Hypertension Mother     Hypertension Father     Diabetes Brother     Diabetes Son     Arthritis Family      Past Surgical History:   Procedure Laterality Date    ABDOMINAL SURGERY          BRAIN HEMATOMA EVACUATION Right 2020    Procedure: Right decompressive craniectomy and evacuation of intraparenchymal hematoma;  Surgeon: Apolinar Herrera MD;  Location: [facility];  Service: Neurosurgery    BREAST SURGERY Right     cyst removal    CARPAL TUNNEL RELEASE Left     CARPAL TUNNEL RELEASE Right 2004    CATARACT EXTRACTION       SECTION  1960    x1    COLONOSCOPY N/A 2018    Procedure: COLONOSCOPY;  Surgeon: Alejandro Carlson MD;  Location: [facility] GI LAB;  Service: Gastroenterology    DILATION AND CURETTAGE OF UTERUS  1967    EYE SURGERY Left 2006    cataracts    HERNIA REPAIR      umbilical hernia    INCISIONAL HERNIA REPAIR      incarcerated    KNEE ARTHROSCOPY Right     NM RPLCMT BONE FLAP/PROSTHETIC PLATE SKULL Right 2020    Procedure: right frontotemporal replacement cranioplasty;  Surgeon: Apolinar Herrera MD;  Location:  MAIN OR;  Service: Neurosurgery    NM XCAPSL CTRC RMVL INSJ IO LENS PROSTH W/O ECP Right 3/27/2017    Procedure: EXTRACTION EXTRACAPSULAR CATARACT PHACO INTRAOCULAR LENS (IOL);  Surgeon: Trip Gilbert MD;  Location: [facility] MAIN OR;  Service: Ophthalmology       Current Facility-Administered Medications:     triamcinolone acetonide (KENALOG-40) 40 mg/mL injection 20 mg, 20 mg, Infiltration, , , 20 mg at 10/03/24 1045    triamcinolone acetonide (Kenalog-40) 40 mg/mL injection 20 mg, 20 mg, Intra-articular, , , 20 mg at 24 1115    Current Outpatient Medications:     apixaban  (Eliquis) 2.5 mg, TAKE 1 TABLET TWICE DAILY, Disp: 180 tablet, Rfl: 1    atorvastatin (LIPITOR) 40 mg tablet, Take 1 tablet (40 mg total) by mouth every evening, Disp: 90 tablet, Rfl: 1    bumetanide (BUMEX) 1 mg tablet, Take 1 tablet (1 mg total) by mouth daily, Disp: 90 tablet, Rfl: 3    Cholecalciferol (Vitamin D3) 50 MCG CAPS, Take by mouth in the morning, Disp: , Rfl:     Diclofenac Sodium (VOLTAREN) 1 %, Apply 2 g topically in the morning APPLY NO MORE THAN 3 DAYS IN A ROW THEN TAKE A BREAK FOR ONE WEEK FROM USE., Disp: 100 g, Rfl: 1    Empagliflozin (Jardiance) 10 MG TABS tablet, Take 1 tablet (10 mg total) by mouth every morning, Disp: 30 tablet, Rfl: 0    hydrOXYzine HCL (ATARAX) 10 mg tablet, Take 1 tablet (10 mg total) by mouth daily at bedtime as needed (insomnia), Disp: 90 tablet, Rfl: 1    levothyroxine 88 mcg tablet, Take 1 tablet (88 mcg total) by mouth daily, Disp: 90 tablet, Rfl: 1    losartan (COZAAR) 25 mg tablet, TAKE 2 TABLETS IN THE MORNING AND TAKE 1 TABLET IN THE EVENING, Disp: 270 tablet, Rfl: 1    Magnesium 400 MG CAPS, Take by mouth in the morning Take 500 mg. BID, Disp: , Rfl:     pantoprazole (PROTONIX) 20 mg tablet, Take 1 tablet (20 mg total) by mouth daily, Disp: 90 tablet, Rfl: 1    spironolactone (ALDACTONE) 25 mg tablet, TAKE 1/2 TABLET EVERY DAY, Disp: 45 tablet, Rfl: 1    clotrimazole-betamethasone (LOTRISONE) 1-0.05 % cream, Apply topically 2 (two) times a day for 25 days, Disp: 45 g, Rfl: 1    metoprolol tartrate (LOPRESSOR) 25 mg tablet, Take 0.5 tablets (12.5 mg total) by mouth every 12 (twelve) hours, Disp: 90 tablet, Rfl: 1  No Known Allergies    Review of Systems:  Review of Systems   Constitutional:  Positive for fatigue. Negative for activity change, appetite change, chills, diaphoresis, fever and unexpected weight change.   HENT: Negative.  Negative for congestion, dental problem, drooling, ear discharge, ear pain, facial swelling, hearing loss, mouth sores,  nosebleeds, postnasal drip, rhinorrhea, sinus pressure, sinus pain, sneezing, sore throat, tinnitus, trouble swallowing and voice change.    Eyes: Negative.  Negative for photophobia, pain, redness, itching and visual disturbance.   Respiratory:  Positive for shortness of breath. Negative for apnea, cough, choking, chest tightness, wheezing and stridor.    Cardiovascular:  Positive for leg swelling. Negative for chest pain and palpitations.   Gastrointestinal: Negative.  Negative for abdominal distention, abdominal pain, anal bleeding, blood in stool, constipation, diarrhea, nausea, rectal pain and vomiting.   Endocrine: Negative.  Negative for cold intolerance, heat intolerance, polydipsia, polyphagia and polyuria.   Genitourinary: Negative.  Negative for decreased urine volume, difficulty urinating, dyspareunia, dysuria, enuresis, flank pain, frequency, genital sores, hematuria, menstrual problem, pelvic pain, urgency, vaginal bleeding, vaginal discharge and vaginal pain.   Musculoskeletal:  Positive for back pain. Negative for arthralgias, gait problem, joint swelling, myalgias, neck pain and neck stiffness.   Skin: Negative.  Negative for color change, pallor, rash and wound.   Allergic/Immunologic: Negative.  Negative for environmental allergies, food allergies and immunocompromised state.   Neurological: Negative.  Negative for dizziness, tremors, seizures, syncope, facial asymmetry, speech difficulty, weakness, light-headedness, numbness and headaches.   Hematological:  Negative for adenopathy. Bruises/bleeds easily.   Psychiatric/Behavioral: Negative.  Negative for agitation, behavioral problems, confusion, decreased concentration, dysphoric mood, hallucinations, self-injury, sleep disturbance and suicidal ideas. The patient is not nervous/anxious and is not hyperactive.    All other systems reviewed and are negative.      Vitals:    01/30/25 1251   BP: 140/68   BP Location: Right arm   Patient Position:  Sitting   Cuff Size: Standard   Pulse: 92   SpO2: 100%   Weight: 68.9 kg (152 lb)   Height: 5' (1.524 m)     Physical Exam:  Physical Exam  Vitals and nursing note reviewed.   Constitutional:       General: She is not in acute distress.     Appearance: She is well-developed. She is ill-appearing. She is not diaphoretic.   HENT:      Head: Normocephalic and atraumatic.      Right Ear: External ear normal.      Left Ear: External ear normal.   Eyes:      General: No scleral icterus.        Right eye: No discharge.         Left eye: No discharge.      Conjunctiva/sclera: Conjunctivae normal.      Pupils: Pupils are equal, round, and reactive to light.   Neck:      Thyroid: No thyromegaly.      Vascular: No JVD.      Trachea: No tracheal deviation.   Cardiovascular:      Rate and Rhythm: Normal rate. Rhythm irregular.      Heart sounds: Murmur heard.      No friction rub. Gallop present.   Pulmonary:      Effort: Pulmonary effort is normal. No respiratory distress.      Breath sounds: Normal breath sounds. No stridor. No wheezing or rales.   Chest:      Chest wall: No tenderness.   Abdominal:      General: Bowel sounds are normal. There is no distension.      Palpations: Abdomen is soft. There is no mass.      Tenderness: There is no abdominal tenderness. There is no guarding or rebound.   Musculoskeletal:         General: Swelling present. No tenderness or deformity. Normal range of motion.      Cervical back: Normal range of motion and neck supple.   Skin:     General: Skin is warm and dry.      Coloration: Skin is not pale.      Findings: Bruising and lesion present. No erythema or rash.   Neurological:      Mental Status: She is alert and oriented to person, place, and time.      Cranial Nerves: No cranial nerve deficit.      Motor: No abnormal muscle tone.      Coordination: Coordination normal.      Deep Tendon Reflexes: Reflexes are normal and symmetric.   Psychiatric:         Behavior: Behavior normal.          Thought Content: Thought content normal.         Judgment: Judgment normal.         Labs:     Lab Results   Component Value Date    WBC 8.10 2025    HGB 13.3 2025    HCT 41.8 2025    MCV 88 2025     (L) 2025     Lab Results   Component Value Date    K 4.7 2025     2025    CO2 23 2025    BUN 30 (H) 2025    CREATININE 1.18 2025    GLUCOSE 124 2020    GLUF 82 2025    CALCIUM 9.0 2025    CORRECTEDCA 10.1 2023    AST 33 2025    ALT 20 2025    ALKPHOS 101 2025    EGFR 42 2025     Lab Results   Component Value Date    CHOL 191 2013    CHOL 218 2013     Lab Results   Component Value Date    HDL 56 2025    HDL 49 (L) 2024    HDL 54 2024     Lab Results   Component Value Date    LDLCALC 45 2025    LDLCALC 35 2024    LDLCALC 35 2024     Lab Results   Component Value Date    TRIG 72 2025    TRIG 89 2024    TRIG 78 2024     Lab Results   Component Value Date    HGBA1C 5.8 (H) 2025       Imaging & Testing   I have personally reviewed pertinent reports.      EKG: Personally reviewed.    Normal sinus rhythm no acute st/t wave    Cardiac testing:   Results for orders placed during the hospital encounter of 01/15/16   Echo complete with contrast if indicated    Narrative [address] [id number] [phone]    Transthoracic Echocardiogram  2D, M-mode, Doppler, and Color Doppler    Study date:  15-Berny-2016    Patient: JOBY SERVIN  MR number: [id number]  Account number: [id number]  : 1940  Age: 75 years  Gender: Female  Status: Routine  Location: Echo lab  Height: 61 in  Weight: 214.5 lb  BP: 132/ 84 mmHg    Indications: HTN    Diagnoses: 401.9 - HYPERTENSION NOS    Sonographer:  Edgardo Henriquez, COLT  Primary Physician:  Jose Ramon Meng  Referring Physician:  LETI  ROQUE  Group:  SAEED Robert  Interpreting Physician:  Roque Whiting DO    SUMMARY    LEFT VENTRICLE:  Systolic function was at the lower limits of normal. Ejection fraction was  estimated in the range of 50 % to 55 %.  There were no regional wall motion abnormalities.  There was moderate concentric hypertrophy.  Features were consistent with a pseudonormal left ventricular filling pattern,  with concomitant abnormal relaxation and increased filling pressure (grade 2  diastolic dysfunction). Doppler parameters were consistent with elevated mean  left atrial filling pressure.    LEFT ATRIUM:  The atrium was moderately dilated.    RIGHT ATRIUM:  The atrium was markedly dilated.    MITRAL VALVE:  There was marked annular calcification.  There was moderate regurgitation.    TRICUSPID VALVE:  There was mild regurgitation.  Pulmonary artery systolic pressure was mildly increased.  Estimated peak PA pressure was 46 mmHg.    HISTORY: PRIOR HISTORY: Patient has no history of cardiovascular disease.    PROCEDURE: The procedure was performed in the echo lab. This was a routine  study. The transthoracic approach was used. The study included complete 2D  imaging, M-mode, complete spectral Doppler, and color Doppler. The heart rate  was 77 bpm, at the start of the study. Echocardiographic views were limited due  to poor acoustic window availability and decreased penetration. This was a  technically difficult study.    LEFT VENTRICLE: Size was normal. Systolic function was at the lower limits of  normal. Ejection fraction was estimated in the range of 50 % to 55 %. There  were no regional wall motion abnormalities. There was moderate concentric  hypertrophy. DOPPLER: Features were consistent with a pseudonormal left  ventricular filling pattern, with concomitant abnormal relaxation and increased  filling pressure (grade 2 diastolic dysfunction). Doppler parameters were  consistent with elevated mean left atrial filling  pressure.    RIGHT VENTRICLE: The size was normal. Systolic function was normal. DOPPLER:  Systolic pressure was within the normal range.    LEFT ATRIUM: The atrium was moderately dilated. No thrombus was identified.    RIGHT ATRIUM: The atrium was markedly dilated.    MITRAL VALVE: There was marked annular calcification. Valve structure was  normal. There was normal leaflet separation. No echocardiographic evidence for  prolapse. DOPPLER: The transmitral velocity was within the normal range. There  was no evidence for stenosis. There was moderate regurgitation.    AORTIC VALVE: The valve was trileaflet. Leaflets exhibited normal thickness,  normal cuspal separation, and sclerosis. DOPPLER: Transaortic velocity was  within the normal range. There was no evidence for stenosis. There was no  regurgitation.    TRICUSPID VALVE: The valve structure was normal. There was normal leaflet  separation. DOPPLER: The transtricuspid velocity was within the normal range.  There was mild regurgitation. Pulmonary artery systolic pressure was mildly  increased. Estimated peak PA pressure was 46 mmHg.    PULMONIC VALVE: Leaflets exhibited normal thickness, no calcification, and  normal cuspal separation. DOPPLER: The transpulmonic velocity was within the  normal range. There was mild regurgitation.    PERICARDIUM: There was no thickening. There was no pericardial effusion.    AORTA: The root exhibited normal size.    PULMONARY ARTERY: The size was normal. The morphology appeared normal.    SYSTEM MEASUREMENT TABLES    2D mode  AoR Diam 2D: 2.8 cm  LA Diam (2D): 5.3 cm  LA/Ao (2D): 1.89  FS (2D Teich): 25.3 %  IVSd (2D): 1.44 cm  LVDEV: 98.3 cm³  LVESV: 49.1 cm³  LVIDd(2D): 4.62 cm  LVISd (2D): 3.45 cm  LVPWd (2D): 1.3 cm  SV (Teich): 49.2 cm³    Apical four chamber  LVEF A4C: 55 %    Unspecified Scan Mode  MV Peak A Jesse: 1110 mm/s  MV Peak E Jesse. Mean: 1400 mm/s  MVA (PHT): 3.55 cm squared  PHT: 58 ms  Max P mm[Hg]  V Max: 2990  "mm/s  Vmax: 3050 mm/s  RA Area: 19.6 cm squared  RA Volume: 55.3 cm³  TAPSE: 1.9 cm    [facility] Accredited Echocardiography Laboratory    Prepared and electronically signed by    Roque Whiting DO  Signed 2016 17:37:47       EKG atrial fibrillation 100 beats per minute  afib 81bmp rbbb nonspecific t-wave changes        Damon Yoon MD [facility]  Please call with any questions or suggestions    A description of the counselin minutes spent with family discussing about risk for long-term anticoagulation versus benefits  Goals and Barriers:  Patient's ability to self care:  Medication side effect reviewed with patient in detail and all their questions answered.    \"This note has been constructed using a voice recognition system.Therefore there may be syntax, spelling, and/or grammatical errors. Please call if you have any questions. \"    "

## 2025-01-31 ENCOUNTER — HOSPITAL ENCOUNTER (INPATIENT)
Facility: HOSPITAL | Age: 85
LOS: 3 days | Discharge: HOME | DRG: 291 | End: 2025-02-04
Attending: EMERGENCY MEDICINE | Admitting: HOSPITALIST
Payer: MEDICARE

## 2025-01-31 ENCOUNTER — TELEPHONE (OUTPATIENT)
Dept: NEPHROLOGY | Facility: CLINIC | Age: 85
End: 2025-01-31

## 2025-01-31 ENCOUNTER — APPOINTMENT (EMERGENCY)
Dept: RADIOLOGY | Facility: HOSPITAL | Age: 85
DRG: 291 | End: 2025-01-31
Payer: MEDICARE

## 2025-01-31 DIAGNOSIS — M79.671 RIGHT FOOT PAIN: ICD-10-CM

## 2025-01-31 DIAGNOSIS — N18.30 STAGE 3 CHRONIC KIDNEY DISEASE, UNSPECIFIED WHETHER STAGE 3A OR 3B CKD (HCC): Primary | ICD-10-CM

## 2025-01-31 DIAGNOSIS — R73.03 PREDIABETES: ICD-10-CM

## 2025-01-31 DIAGNOSIS — I50.32 CHRONIC DIASTOLIC (CONGESTIVE) HEART FAILURE (HCC): ICD-10-CM

## 2025-01-31 DIAGNOSIS — R79.89 ELEVATED BRAIN NATRIURETIC PEPTIDE (BNP) LEVEL: ICD-10-CM

## 2025-01-31 DIAGNOSIS — R06.09 DYSPNEA ON EXERTION: Primary | ICD-10-CM

## 2025-01-31 DIAGNOSIS — I10 BENIGN ESSENTIAL HYPERTENSION: ICD-10-CM

## 2025-01-31 DIAGNOSIS — M72.2 PLANTAR FASCIITIS: ICD-10-CM

## 2025-01-31 DIAGNOSIS — I13.0 HYPERTENSIVE HEART AND CHRONIC KIDNEY DISEASE WITH HEART FAILURE AND STAGE 1 THROUGH STAGE 4 CHRONIC KIDNEY DISEASE, OR CHRONIC KIDNEY DISEASE (HCC): ICD-10-CM

## 2025-01-31 LAB
2HR DELTA HS TROPONIN: -3 NG/L
ALBUMIN SERPL BCG-MCNC: 4.2 G/DL (ref 3.5–5)
ALP SERPL-CCNC: 101 U/L (ref 34–104)
ALT SERPL W P-5'-P-CCNC: 20 U/L (ref 7–52)
ANION GAP SERPL CALCULATED.3IONS-SCNC: 13 MMOL/L (ref 4–13)
APTT PPP: 34 SECONDS (ref 23–34)
AST SERPL W P-5'-P-CCNC: 33 U/L (ref 13–39)
ATRIAL RATE: 80 BPM
BASOPHILS # BLD AUTO: 0.02 THOUSANDS/ΜL (ref 0–0.1)
BASOPHILS NFR BLD AUTO: 0 % (ref 0–1)
BILIRUB SERPL-MCNC: 0.99 MG/DL (ref 0.2–1)
BNP SERPL-MCNC: 445 PG/ML (ref 0–100)
BUN SERPL-MCNC: 30 MG/DL (ref 5–25)
CALCIUM SERPL-MCNC: 9 MG/DL (ref 8.4–10.2)
CARDIAC TROPONIN I PNL SERPL HS: 28 NG/L (ref ?–50)
CARDIAC TROPONIN I PNL SERPL HS: 31 NG/L (ref ?–50)
CHLORIDE SERPL-SCNC: 104 MMOL/L (ref 96–108)
CO2 SERPL-SCNC: 23 MMOL/L (ref 21–32)
CREAT SERPL-MCNC: 1.18 MG/DL (ref 0.6–1.3)
D DIMER PPP FEU-MCNC: 0.84 UG/ML FEU
EOSINOPHIL # BLD AUTO: 0.05 THOUSAND/ΜL (ref 0–0.61)
EOSINOPHIL NFR BLD AUTO: 1 % (ref 0–6)
ERYTHROCYTE [DISTWIDTH] IN BLOOD BY AUTOMATED COUNT: 14.5 % (ref 11.6–15.1)
FLUAV AG UPPER RESP QL IA.RAPID: NEGATIVE
FLUBV AG UPPER RESP QL IA.RAPID: NEGATIVE
GFR SERPL CREATININE-BSD FRML MDRD: 42 ML/MIN/1.73SQ M
GLUCOSE SERPL-MCNC: 114 MG/DL (ref 65–140)
GLUCOSE SERPL-MCNC: 88 MG/DL (ref 65–140)
GLUCOSE SERPL-MCNC: 94 MG/DL (ref 65–140)
HCT VFR BLD AUTO: 41.8 % (ref 34.8–46.1)
HGB BLD-MCNC: 13.3 G/DL (ref 11.5–15.4)
IMM GRANULOCYTES # BLD AUTO: 0.02 THOUSAND/UL (ref 0–0.2)
IMM GRANULOCYTES NFR BLD AUTO: 0 % (ref 0–2)
INR PPP: 1.31 (ref 0.85–1.19)
LYMPHOCYTES # BLD AUTO: 0.68 THOUSANDS/ΜL (ref 0.6–4.47)
LYMPHOCYTES NFR BLD AUTO: 8 % (ref 14–44)
MCH RBC QN AUTO: 27.9 PG (ref 26.8–34.3)
MCHC RBC AUTO-ENTMCNC: 31.8 G/DL (ref 31.4–37.4)
MCV RBC AUTO: 88 FL (ref 82–98)
MONOCYTES # BLD AUTO: 0.51 THOUSAND/ΜL (ref 0.17–1.22)
MONOCYTES NFR BLD AUTO: 6 % (ref 4–12)
NEUTROPHILS # BLD AUTO: 6.82 THOUSANDS/ΜL (ref 1.85–7.62)
NEUTS SEG NFR BLD AUTO: 85 % (ref 43–75)
NRBC BLD AUTO-RTO: 0 /100 WBCS
PLATELET # BLD AUTO: 138 THOUSANDS/UL (ref 149–390)
PMV BLD AUTO: 11.7 FL (ref 8.9–12.7)
POTASSIUM SERPL-SCNC: 4.7 MMOL/L (ref 3.5–5.3)
PROT SERPL-MCNC: 7.4 G/DL (ref 6.4–8.4)
PROTHROMBIN TIME: 16.8 SECONDS (ref 12.3–15)
QRS AXIS: 118 DEGREES
QRSD INTERVAL: 114 MS
QT INTERVAL: 382 MS
QTC INTERVAL: 497 MS
RBC # BLD AUTO: 4.76 MILLION/UL (ref 3.81–5.12)
SARS-COV+SARS-COV-2 AG RESP QL IA.RAPID: NEGATIVE
SODIUM SERPL-SCNC: 140 MMOL/L (ref 135–147)
T WAVE AXIS: 16 DEGREES
VENTRICULAR RATE: 102 BPM
WBC # BLD AUTO: 8.1 THOUSAND/UL (ref 4.31–10.16)

## 2025-01-31 PROCEDURE — 93005 ELECTROCARDIOGRAM TRACING: CPT

## 2025-01-31 PROCEDURE — 80053 COMPREHEN METABOLIC PANEL: CPT | Performed by: EMERGENCY MEDICINE

## 2025-01-31 PROCEDURE — 94760 N-INVAS EAR/PLS OXIMETRY 1: CPT

## 2025-01-31 PROCEDURE — 94660 CPAP INITIATION&MGMT: CPT

## 2025-01-31 PROCEDURE — 99285 EMERGENCY DEPT VISIT HI MDM: CPT | Performed by: EMERGENCY MEDICINE

## 2025-01-31 PROCEDURE — 85610 PROTHROMBIN TIME: CPT | Performed by: EMERGENCY MEDICINE

## 2025-01-31 PROCEDURE — 87804 INFLUENZA ASSAY W/OPTIC: CPT | Performed by: EMERGENCY MEDICINE

## 2025-01-31 PROCEDURE — 87811 SARS-COV-2 COVID19 W/OPTIC: CPT | Performed by: EMERGENCY MEDICINE

## 2025-01-31 PROCEDURE — 84484 ASSAY OF TROPONIN QUANT: CPT | Performed by: EMERGENCY MEDICINE

## 2025-01-31 PROCEDURE — 99222 1ST HOSP IP/OBS MODERATE 55: CPT | Performed by: HOSPITALIST

## 2025-01-31 PROCEDURE — 36415 COLL VENOUS BLD VENIPUNCTURE: CPT | Performed by: EMERGENCY MEDICINE

## 2025-01-31 PROCEDURE — 99285 EMERGENCY DEPT VISIT HI MDM: CPT

## 2025-01-31 PROCEDURE — 85379 FIBRIN DEGRADATION QUANT: CPT | Performed by: HOSPITALIST

## 2025-01-31 PROCEDURE — 85730 THROMBOPLASTIN TIME PARTIAL: CPT | Performed by: EMERGENCY MEDICINE

## 2025-01-31 PROCEDURE — 85025 COMPLETE CBC W/AUTO DIFF WBC: CPT | Performed by: EMERGENCY MEDICINE

## 2025-01-31 PROCEDURE — 82948 REAGENT STRIP/BLOOD GLUCOSE: CPT

## 2025-01-31 PROCEDURE — 71045 X-RAY EXAM CHEST 1 VIEW: CPT

## 2025-01-31 PROCEDURE — 83880 ASSAY OF NATRIURETIC PEPTIDE: CPT | Performed by: EMERGENCY MEDICINE

## 2025-01-31 RX ORDER — BUMETANIDE 1 MG/1
1 TABLET ORAL DAILY
Status: DISCONTINUED | OUTPATIENT
Start: 2025-02-01 | End: 2025-02-01

## 2025-01-31 RX ORDER — FUROSEMIDE 10 MG/ML
40 INJECTION INTRAMUSCULAR; INTRAVENOUS ONCE
Status: COMPLETED | OUTPATIENT
Start: 2025-01-31 | End: 2025-01-31

## 2025-01-31 RX ORDER — SPIRONOLACTONE 25 MG/1
12.5 TABLET ORAL DAILY
Status: DISCONTINUED | OUTPATIENT
Start: 2025-02-01 | End: 2025-02-04 | Stop reason: HOSPADM

## 2025-01-31 RX ORDER — LOSARTAN POTASSIUM 50 MG/1
50 TABLET ORAL DAILY
Status: DISCONTINUED | OUTPATIENT
Start: 2025-02-01 | End: 2025-02-04 | Stop reason: HOSPADM

## 2025-01-31 RX ORDER — PANTOPRAZOLE SODIUM 20 MG/1
20 TABLET, DELAYED RELEASE ORAL DAILY
Status: DISCONTINUED | OUTPATIENT
Start: 2025-02-01 | End: 2025-02-04 | Stop reason: HOSPADM

## 2025-01-31 RX ORDER — INSULIN LISPRO 100 [IU]/ML
1-5 INJECTION, SOLUTION INTRAVENOUS; SUBCUTANEOUS
Status: DISCONTINUED | OUTPATIENT
Start: 2025-01-31 | End: 2025-02-04 | Stop reason: HOSPADM

## 2025-01-31 RX ORDER — LOSARTAN POTASSIUM 25 MG/1
25 TABLET ORAL EVERY EVENING
Status: DISCONTINUED | OUTPATIENT
Start: 2025-01-31 | End: 2025-02-03

## 2025-01-31 RX ORDER — HYDROXYZINE HYDROCHLORIDE 10 MG/1
10 TABLET, FILM COATED ORAL
Status: DISCONTINUED | OUTPATIENT
Start: 2025-01-31 | End: 2025-02-04 | Stop reason: HOSPADM

## 2025-01-31 RX ORDER — LEVOTHYROXINE SODIUM 88 UG/1
88 TABLET ORAL
Status: DISCONTINUED | OUTPATIENT
Start: 2025-02-01 | End: 2025-02-04 | Stop reason: HOSPADM

## 2025-01-31 RX ORDER — ATORVASTATIN CALCIUM 40 MG/1
40 TABLET, FILM COATED ORAL EVERY EVENING
Status: DISCONTINUED | OUTPATIENT
Start: 2025-01-31 | End: 2025-02-04 | Stop reason: HOSPADM

## 2025-01-31 RX ADMIN — Medication 12.5 MG: at 23:00

## 2025-01-31 RX ADMIN — ATORVASTATIN CALCIUM 40 MG: 40 TABLET, FILM COATED ORAL at 23:00

## 2025-01-31 RX ADMIN — Medication 3 MG: at 23:00

## 2025-01-31 RX ADMIN — APIXABAN 2.5 MG: 2.5 TABLET, FILM COATED ORAL at 23:00

## 2025-01-31 RX ADMIN — FUROSEMIDE 40 MG: 10 INJECTION, SOLUTION INTRAMUSCULAR; INTRAVENOUS at 14:48

## 2025-01-31 RX ADMIN — LOSARTAN POTASSIUM 25 MG: 25 TABLET, FILM COATED ORAL at 23:00

## 2025-01-31 NOTE — TELEPHONE ENCOUNTER
----- Message from Ele Miller MD sent at 1/30/2025  3:49 PM EST -----  Regarding: FW: dyspnea/pulm htn  Hello    Please let the patient know that since her cardiologist is starting the new medication with Jardiance.  No objection from a renal standpoint to start.  But she should check a BMP 1 week after starting.  Also there is a very slightly increased risk of urinary tract infection so if she has any pain or burning or fevers or trouble urinating she is to call right away and we can check urine testing.  This is a low risk.  No objection for her to start the medication from renal standpoint    Please send her a BMP slip    Thank you  ----- Message -----  From: Damon Yoon MD  Sent: 1/30/2025   3:21 PM EST  To: Ele Miller MD  Subject: dyspnea/pulm htn                                 Isha Sony,       Hope your well. I was going to try Miss Wise on Jardiance 10 mg to help with her worsening exertional dyspnea- unless any concerns.  She has severe pulm hypertension on her echocardiogram.  I just wanted to coordinate care.    -Damon

## 2025-01-31 NOTE — TELEPHONE ENCOUNTER
Spoke to pt, went over Dr. Miller's recommendations. Pt also mentioned that for the past 2 days she has been a little short of breath and tired, prefers to stay in bed and that is unlike her. I asked if she should go to the ER, but she states it is not that bad.   Also called her daughter in law as well, just to make sure that she is aware of the symptoms, since they live together. She assured me that she will take her to ER if worsening symptoms.

## 2025-01-31 NOTE — ASSESSMENT & PLAN NOTE
Lab Results   Component Value Date    EGFR 42 01/31/2025    EGFR 42 01/03/2025    EGFR 36 10/08/2024    CREATININE 1.18 01/31/2025    CREATININE 1.17 01/03/2025    CREATININE 1.34 (H) 10/08/2024     Monitor for now

## 2025-01-31 NOTE — PLAN OF CARE
Problem: PAIN - ADULT  Goal: Verbalizes/displays adequate comfort level or baseline comfort level  Description: Interventions:  - Encourage patient to monitor pain and request assistance  - Assess pain using appropriate pain scale  - Administer analgesics based on type and severity of pain and evaluate response  - Implement non-pharmacological measures as appropriate and evaluate response  - Consider cultural and social influences on pain and pain management  - Notify physician/advanced practitioner if interventions unsuccessful or patient reports new pain  Outcome: Progressing     Problem: INFECTION - ADULT  Goal: Absence or prevention of progression during hospitalization  Description: INTERVENTIONS:  - Assess and monitor for signs and symptoms of infection  - Monitor lab/diagnostic results  - Monitor all insertion sites, i.e. indwelling lines, tubes, and drains  - Monitor endotracheal if appropriate and nasal secretions for changes in amount and color  - [place] appropriate cooling/warming therapies per order  - Administer medications as ordered  - Instruct and encourage patient and family to use good hand hygiene technique  - Identify and instruct in appropriate isolation precautions for identified infection/condition  Outcome: Progressing  Goal: Absence of fever/infection during neutropenic period  Description: INTERVENTIONS:  - Monitor WBC    Outcome: Progressing     Problem: SAFETY ADULT  Goal: Patient will remain free of falls  Description: INTERVENTIONS:  - Educate patient/family on patient safety including physical limitations  - Instruct patient to call for assistance with activity   - Consult OT/PT to assist with strengthening/mobility   - Keep Call bell within reach  - Keep bed low and locked with side rails adjusted as appropriate  - Keep care items and personal belongings within reach  - Initiate and maintain comfort rounds  - Make Fall Risk Sign visible to staff  - Offer Toileting every 2 Hours,  in advance of need  - Initiate/Maintain chair/bed alarm  - Obtain necessary fall risk management equipment: walker  - Apply yellow socks and bracelet for high fall risk patients  - Consider moving patient to room near nurses station  Outcome: Progressing  Goal: Maintain or return to baseline ADL function  Description: INTERVENTIONS:  -  Assess patient's ability to carry out ADLs; assess patient's baseline for ADL function and identify physical deficits which impact ability to perform ADLs (bathing, care of mouth/teeth, toileting, grooming, dressing, etc.)  - Assess/evaluate cause of self-care deficits   - Assess range of motion  - Assess patient's mobility; develop plan if impaired  - Assess patient's need for assistive devices and provide as appropriate  - Encourage maximum independence but intervene and supervise when necessary  - Involve family in performance of ADLs  - Assess for home care needs following discharge   - Consider OT consult to assist with ADL evaluation and planning for discharge  - Provide patient education as appropriate  Outcome: Progressing  Goal: Maintains/Returns to pre admission functional level  Description: INTERVENTIONS:  - Perform AM-PAC 6 Click Basic Mobility/ Daily Activity assessment daily.  - Set and communicate daily mobility goal to care team and patient/family/caregiver.   - Collaborate with rehabilitation services on mobility goals if consulted  - Perform Range of Motion 2 times a day.  - Reposition patient every 2 hours.  - Dangle patient 2 times a day  - Stand patient 2 times a day  - Ambulate patient 2 times a day  - Out of bed to chair 2 times a day   - Out of bed for meals 2 times a day  - Out of bed for toileting  - Record patient progress and toleration of activity level   Outcome: Progressing     Problem: DISCHARGE PLANNING  Goal: Discharge to home or other facility with appropriate resources  Description: INTERVENTIONS:  - Identify barriers to discharge w/patient and  caregiver  - Arrange for needed discharge resources and transportation as appropriate  - Identify discharge learning needs (meds, wound care, etc.)  - Arrange for interpretive services to assist at discharge as needed  - Refer to Case Management Department for coordinating discharge planning if the patient needs post-hospital services based on physician/advanced practitioner order or complex needs related to functional status, cognitive ability, or social support system  Outcome: Progressing     Problem: Knowledge Deficit  Goal: Patient/family/caregiver demonstrates understanding of disease process, treatment plan, medications, and discharge instructions  Description: Complete learning assessment and assess knowledge base.  Interventions:  - Provide teaching at level of understanding  - Provide teaching via preferred learning methods  Outcome: Progressing

## 2025-01-31 NOTE — ASSESSMENT & PLAN NOTE
Probably multifactorial in etiology  Patient has underlying diastolic CHF, atrial fibrillation and obstructive sleep apnea  Echocardiogram also showed moderate mitral regurgitation and tricuspid regurgitation  Telemetry monitoring  Consult cardiology

## 2025-01-31 NOTE — ED PROVIDER NOTES
Time reflects when diagnosis was documented in both MDM as applicable and the Disposition within this note       Time User Action Codes Description Comment    1/31/2025  2:38 PM Sheila Magaña [R06.09] Dyspnea on exertion     1/31/2025  2:39 PM Sheila Magaña [R79.89] Elevated brain natriuretic peptide (BNP) level     1/31/2025  3:01 PM Waldemar Hicks [M72.2] Plantar fasciitis     1/31/2025  3:01 PM Waldemar Hicks [M79.671] Right foot pain           ED Disposition       ED Disposition   Admit    Condition   Stable    Date/Time   Fri Jan 31, 2025  2:38 PM    Comment   Case was discussed with [facility] and the patient's admission status was agreed to be Admission Status: observation status to the service of Dr. Hicks.               Assessment & Plan       Medical Decision Making  Pt is a 83yo F who presents with shortness of breath with exertion. Exam pertinent for lower extremity edema.    Differential diagnosis to include but not limited to CHF exacerbation, pleural effusion, fluid overload, ACS, viral syndrome.  Plan for labs and imaging.  See ED course for results and details.    Plan to admit pt to [facility]. Pt discussed with admitting team and admission orders placed. Pt admitted without incident.         Amount and/or Complexity of Data Reviewed  Labs: ordered. Decision-making details documented in ED Course.  Radiology: ordered.  ECG/medicine tests:  Decision-making details documented in ED Course.    Risk  Prescription drug management.  Decision regarding hospitalization.        ED Course as of 01/31/25 1619   Fri Jan 31, 2025   1104 ECG 12 lead  Procedure Note: EKG  Date/Time: 01/31/25 11:04 AM   Interpreted by: Sheila Magaña MD  Indications / Diagnosis: SOB  ECG reviewed by me, the ED Physician: yes   The EKG demonstrates:  Rhythm: irregular (hx of AF per chart review)  Intervals: normal intervals  Axis: abnormal axis  QRS/Blocks: incomplete RBBB  ST Changes: No acute ST Changes, no  STD/KUMAR.   1106 SpO2: 96 %  On RA.    1147 CBC and differential(!)  Reviewed and without actionable derangement.    1147 WBC: 8.10  WNL   1204 FLU/COVID Rapid Antigen (30 min. TAT) - Preferred screening test in ED  Negative.    1204 PTT: 34   1204 POCT INR(!): 1.31   1207 hs TnI 0hr: 31  Will require delta. Hx of similar.    1208 BNP(!): 445  Slightly more elevated than most recent prior.    1213 Comprehensive metabolic panel(!)  Reviewed and without actionable derangement.    1329 Delta trop in process.    1401 Delta 2hr hsTnI: -3  Negative.    1407 Pt reassessed. Appears comfortable, sat 98% however states she still feels short of breath. Pt and daughter made aware of all results and plan for ambulatory pulse ox prior to dispo.    1423 Pt ambulated with walker. Pt reports that she felt SOB. During ambulation HR and sat WNL, however, after getting back into bed, pt found to be in the 120s. Joint decision making used for dispo and decision made for admission.        Medications   insulin lispro (HumALOG/ADMELOG) 100 units/mL subcutaneous injection 1-5 Units (has no administration in time range)   furosemide (LASIX) injection 40 mg (40 mg Intravenous Given 1/31/25 1448)       ED Risk Strat Scores                          SBIRT 20yo+      Flowsheet Row Most Recent Value   Initial Alcohol Screen: US AUDIT-C     1. How often do you have a drink containing alcohol? 0 Filed at: 01/31/2025 1107   2. How many drinks containing alcohol do you have on a typical day you are drinking?  0 Filed at: 01/31/2025 1107   3a. Male UNDER 65: How often do you have five or more drinks on one occasion? 0 Filed at: 01/31/2025 1107   3b. FEMALE Any Age, or MALE 65+: How often do you have 4 or more drinks on one occassion? 0 Filed at: 01/31/2025 1107   Audit-C Score 0 Filed at: 01/31/2025 1107   LUL: How many times in the past year have you...    Used an illegal drug or used a prescription medication for non-medical reasons? Never Filed at:  2025 1107                            History of Present Illness       Chief Complaint   Patient presents with    Shortness of Breath     Pt brought in by squad from home with c/o SOB since yesterday.  Pt reports having cough as well.  Sat 96 on RA       Past Medical History:   Diagnosis Date    Anxiety     Arthritis     joints    Bunion     Cataract     Disease of thyroid gland     Eczema     GERD (gastroesophageal reflux disease)     Gout     Heart failure (HCC)     Hepatitis     Hiatal hernia     Hypertension     Nephrolithiasis 2017    Onychomycosis     last assessed: 16    Orthopnea     resolved: 16    Pseudogout of left wrist     Sleep apnea     wears CPAP    Stroke (HCC)       Past Surgical History:   Procedure Laterality Date    ABDOMINAL SURGERY          BRAIN HEMATOMA EVACUATION Right 2020    Procedure: Right decompressive craniectomy and evacuation of intraparenchymal hematoma;  Surgeon: Apolinar Herrera MD;  Location: [facility];  Service: Neurosurgery    BREAST SURGERY Right     cyst removal    CARPAL TUNNEL RELEASE Left     CARPAL TUNNEL RELEASE Right     CATARACT EXTRACTION       SECTION  1960    x1    COLONOSCOPY N/A 2018    Procedure: COLONOSCOPY;  Surgeon: Alejandro Carlson MD;  Location: [facility] GI LAB;  Service: Gastroenterology    DILATION AND CURETTAGE OF UTERUS  1967    EYE SURGERY Left 2006    cataracts    HERNIA REPAIR      umbilical hernia    INCISIONAL HERNIA REPAIR      incarcerated    KNEE ARTHROSCOPY Right     NJ RPLCMT BONE FLAP/PROSTHETIC PLATE SKULL Right 2020    Procedure: right frontotemporal replacement cranioplasty;  Surgeon: Apolinar Herrera MD;  Location:  MAIN OR;  Service: Neurosurgery    NJ XCAPSL CTRC RMVL INSJ IO LENS PROSTH W/O ECP Right 3/27/2017    Procedure: EXTRACTION EXTRACAPSULAR CATARACT PHACO INTRAOCULAR LENS (IOL);  Surgeon: Trip Gilbert MD;  Location: [facility] MAIN OR;  Service: Ophthalmology      Family  History   Problem Relation Age of Onset    Diabetes Mother     Coronary artery disease Mother     Arthritis Mother     Hypertension Mother     Hypertension Father     Diabetes Brother     Diabetes Son     Arthritis Family       Social History     Tobacco Use    Smoking status: Never    Smokeless tobacco: Never   Vaping Use    Vaping status: Never Used   Substance Use Topics    Alcohol use: Yes     Comment: Only on special occasions    Drug use: Never      E-Cigarette/Vaping    E-Cigarette Use Never User       E-Cigarette/Vaping Substances    Nicotine No     THC No     CBD No     Flavoring No     Other No     Unknown No       I have reviewed and agree with the history as documented.     Pt is an 83yo F who presents for shortness of breath.  Patient reports that she has had increasing shortness of breath with exertion to the point where she becomes extremely winded with even several steps.  Patient denies any associated chest pain.  Patient denies any recent illness including fevers or chills.  Patient does report lower extremity edema.  Patient also reports weight increase.  Per chart review, patient has gained 3 pounds since seen by cardiology yesterday.  Per chart review, medication changes made by cardiology yesterday secondary to dyspnea on exertion.  Patient reports she did take her new medication this morning but came in today because she was feeling unwell.            Objective       ED Triage Vitals [01/31/25 1100]   Temperature Pulse Blood Pressure Respirations SpO2 Patient Position - Orthostatic VS   97.7 °F (36.5 °C) 78 164/89 22 96 % Lying      Temp Source Heart Rate Source BP Location FiO2 (%) Pain Score    Tympanic Monitor Right arm -- No Pain      Vitals      Date and Time Temp Pulse SpO2 Resp BP Pain Score FACES Pain Rating User   01/31/25 1535 97.6 °F (36.4 °C) 108 98 % 16 167/72 No Pain -- FO   01/31/25 1445 -- 102 98 % 15 161/68 -- -- URBAN   01/31/25 1433 98.2 °F (36.8 °C) 105 97 % 15 161/68 5 -- FO    01/31/25 1130 -- 97 97 % 13 175/70 -- -- URBAN   01/31/25 1100 97.7 °F (36.5 °C) 78 96 % 22 164/89 No Pain -- RAMOS            Physical Exam  Vitals reviewed.   Constitutional:       General: She is not in acute distress.     Appearance: She is well-developed. She is not toxic-appearing or diaphoretic.   HENT:      Head: Normocephalic and atraumatic.      Right Ear: External ear normal.      Left Ear: External ear normal.      Nose: Nose normal.      Mouth/Throat:      Pharynx: Oropharynx is clear.   Eyes:      Extraocular Movements: Extraocular movements intact.      Pupils: Pupils are equal, round, and reactive to light.   Cardiovascular:      Rate and Rhythm: Normal rate and regular rhythm.      Heart sounds: Normal heart sounds.   Pulmonary:      Effort: Pulmonary effort is normal.      Breath sounds: Rales present.   Abdominal:      General: Bowel sounds are normal.      Palpations: Abdomen is soft.   Musculoskeletal:         General: Normal range of motion.      Cervical back: Normal range of motion and neck supple.      Right lower leg: Edema (Symmetric) present.      Left lower leg: Edema (Symmetric) present.   Skin:     General: Skin is warm and dry.      Capillary Refill: Capillary refill takes less than 2 seconds.   Neurological:      General: No focal deficit present.      Mental Status: She is alert and oriented to person, place, and time.   Psychiatric:         Speech: Speech normal.         Behavior: Behavior is cooperative.         Wt Readings from Last 3 Encounters:   01/31/25 70.5 kg (155 lb 6.8 oz)   01/30/25 68.9 kg (152 lb)   01/06/25 65.3 kg (144 lb)         Results Reviewed       Procedure Component Value Units Date/Time    D-dimer, quantitative [[id number]] Collected: 01/31/25 1134    Lab Status: In process Specimen: Blood from Arm, Left Updated: 01/31/25 1613    HS Troponin I 2hr [[id number]]  (Normal) Collected: 01/31/25 1329    Lab Status: Final result Specimen: Blood from Arm, Left Updated:  01/31/25 1401     hs TnI 2hr 28 ng/L      Delta 2hr hsTnI -3 ng/L     Comprehensive metabolic panel [[id number]]  (Abnormal) Collected: 01/31/25 1134    Lab Status: Final result Specimen: Blood from Arm, Left Updated: 01/31/25 1212     Sodium 140 mmol/L      Potassium 4.7 mmol/L      Chloride 104 mmol/L      CO2 23 mmol/L      ANION GAP 13 mmol/L      BUN 30 mg/dL      Creatinine 1.18 mg/dL      Glucose 94 mg/dL      Calcium 9.0 mg/dL      AST 33 U/L      ALT 20 U/L      Alkaline Phosphatase 101 U/L      Total Protein 7.4 g/dL      Albumin 4.2 g/dL      Total Bilirubin 0.99 mg/dL      eGFR 42 ml/min/1.73sq m     Narrative:      National Kidney Disease Foundation guidelines for Chronic Kidney Disease (CKD):     Stage 1 with normal or high GFR (GFR > 90 mL/min/1.73 square meters)    Stage 2 Mild CKD (GFR = 60-89 mL/min/1.73 square meters)    Stage 3A Moderate CKD (GFR = 45-59 mL/min/1.73 square meters)    Stage 3B Moderate CKD (GFR = 30-44 mL/min/1.73 square meters)    Stage 4 Severe CKD (GFR = 15-29 mL/min/1.73 square meters)    Stage 5 End Stage CKD (GFR <15 mL/min/1.73 square meters)  Note: GFR calculation is accurate only with a steady state creatinine    HS Troponin 0hr (reflex protocol) [[id number]]  (Normal) Collected: 01/31/25 1134    Lab Status: Final result Specimen: Blood from Arm, Left Updated: 01/31/25 1206     hs TnI 0hr 31 ng/L     B-Type Natriuretic Peptide(BNP) [[id number]]  (Abnormal) Collected: 01/31/25 1134    Lab Status: Final result Specimen: Blood from Arm, Left Updated: 01/31/25 1205      pg/mL     Protime-INR [[id number]]  (Abnormal) Collected: 01/31/25 1134    Lab Status: Final result Specimen: Blood from Arm, Left Updated: 01/31/25 1204     Protime 16.8 seconds      INR 1.31    Narrative:      INR Therapeutic Range    Indication                                             INR Range      Atrial Fibrillation                                               2.0-3.0  Hypercoagulable State                                     2.0.2.3  Left Ventricular Asist Device                            2.0-3.0  Mechanical Heart Valve                                  -    Aortic(with afib, MI, embolism, HF, LA enlargement,    and/or coagulopathy)                                     2.0-3.0 (2.5-3.5)     Mitral                                                             2.5-3.5  Prosthetic/Bioprosthetic Heart Valve               2.0-3.0  Venous thromboembolism (VTE: VT, PE        2.0-3.0    APTT [[id number]]  (Normal) Collected: 01/31/25 1134    Lab Status: Final result Specimen: Blood from Arm, Left Updated: 01/31/25 1204     PTT 34 seconds     FLU/COVID Rapid Antigen (30 min. TAT) - Preferred screening test in ED [[id number]]  (Normal) Collected: 01/31/25 1134    Lab Status: Final result Specimen: Nares from Nose Updated: 01/31/25 1159     SARS COV Rapid Antigen Negative     Influenza A Rapid Antigen Negative     Influenza B Rapid Antigen Negative    Narrative:      This test has been performed using the [email or web address] Daniel[employer] 2 FLU+SARS Antigen test under the Emergency Use Authorization (EUA). This test has been validated by the  and verified by the performing laboratory. The Daniela uses lateral flow immunofluorescent sandwich assay to detect SARS-COV, Influenza A and Influenza B Antigen.     The Quidel Daniela 2 SARS Antigen test does not differentiate between SARS-CoV and SARS-CoV-2.     Negative results are presumptive and may be confirmed with a molecular assay, if necessary, for patient management. Negative results do not rule out SARS-CoV-2 or influenza infection and should not be used as the sole basis for treatment or patient management decisions. A negative test result may occur if the level of antigen in a sample is below the limit of detection of this test.     Positive results are indicative of the presence of viral antigens, but do not rule out bacterial infection or co-infection with other viruses.     All  test results should be used as an adjunct to clinical observations and other information available to the provider.    FOR PEDIATRIC PATIENTS - copy/paste COVID Guidelines URL to browser: [email or web address]    CBC and differential [[id number]]  (Abnormal) Collected: 01/31/25 1134    Lab Status: Final result Specimen: Blood from Arm, Left Updated: 01/31/25 1146     WBC 8.10 Thousand/uL      RBC 4.76 Million/uL      Hemoglobin 13.3 g/dL      Hematocrit 41.8 %      MCV 88 fL      MCH 27.9 pg      MCHC 31.8 g/dL      RDW 14.5 %      MPV 11.7 fL      Platelets 138 Thousands/uL      nRBC 0 /100 WBCs      Segmented % 85 %      Immature Grans % 0 %      Lymphocytes % 8 %      Monocytes % 6 %      Eosinophils Relative 1 %      Basophils Relative 0 %      Absolute Neutrophils 6.82 Thousands/µL      Absolute Immature Grans 0.02 Thousand/uL      Absolute Lymphocytes 0.68 Thousands/µL      Absolute Monocytes 0.51 Thousand/µL      Eosinophils Absolute 0.05 Thousand/µL      Basophils Absolute 0.02 Thousands/µL             XR chest portable   Final Interpretation by Adelina Mix MD (01/31 8036)      Bibasilar hazy and interstitial opacity. In the acute setting, diagnostic considerations include pneumonitis and cardiogenic/noncardiogenic edema.               Workstation performed: [id number]             Procedures    ED Medication and Procedure Management   Prior to Admission Medications   Prescriptions Last Dose Informant Patient Reported? Taking?   Cholecalciferol (Vitamin D3) 50 MCG CAPS  Self Yes No   Sig: Take by mouth in the morning   Diclofenac Sodium (VOLTAREN) 1 %  Self No No   Sig: Apply 2 g topically in the morning APPLY NO MORE THAN 3 DAYS IN A ROW THEN TAKE A BREAK FOR ONE WEEK FROM USE.   Empagliflozin (Jardiance) 10 MG TABS tablet   No No   Sig: Take 1 tablet (10 mg total) by mouth every morning   Magnesium 400 MG CAPS  Self Yes No   Sig: Take by mouth in the  morning Take 500 mg. BID   apixaban (Eliquis) 2.5 mg  Self No No   Sig: TAKE 1 TABLET TWICE DAILY   atorvastatin (LIPITOR) 40 mg tablet  Self No No   Sig: Take 1 tablet (40 mg total) by mouth every evening   bumetanide (BUMEX) 1 mg tablet  Self No No   Sig: Take 1 tablet (1 mg total) by mouth daily   clotrimazole-betamethasone (LOTRISONE) 1-0.05 % cream   No No   Sig: Apply topically 2 (two) times a day for 25 days   hydrOXYzine HCL (ATARAX) 10 mg tablet  Self No No   Sig: Take 1 tablet (10 mg total) by mouth daily at bedtime as needed (insomnia)   levothyroxine 88 mcg tablet  Self No No   Sig: Take 1 tablet (88 mcg total) by mouth daily   losartan (COZAAR) 25 mg tablet  Self No No   Sig: TAKE 2 TABLETS IN THE MORNING AND TAKE 1 TABLET IN THE EVENING   metoprolol tartrate (LOPRESSOR) 25 mg tablet   No No   Sig: Take 0.5 tablets (12.5 mg total) by mouth every 12 (twelve) hours   pantoprazole (PROTONIX) 20 mg tablet  Self No No   Sig: Take 1 tablet (20 mg total) by mouth daily   spironolactone (ALDACTONE) 25 mg tablet  Self No No   Sig: TAKE 1/2 TABLET EVERY DAY      Facility-Administered Medications Last Administration Doses Remaining   triamcinolone acetonide (KENALOG-40) 40 mg/mL injection 20 mg 10/3/2024 10:45 AM    triamcinolone acetonide (Kenalog-40) 40 mg/mL injection 20 mg 12/12/2024 11:15 AM         Current Discharge Medication List        CONTINUE these medications which have NOT CHANGED    Details   apixaban (Eliquis) 2.5 mg TAKE 1 TABLET TWICE DAILY  Qty: 180 tablet, Refills: 1    Associated Diagnoses: Moderate to severe mitral regurgitation; Chronic diastolic (congestive) heart failure (HCC); Atrial fibrillation by electrocardiogram (HCC)      atorvastatin (LIPITOR) 40 mg tablet Take 1 tablet (40 mg total) by mouth every evening  Qty: 90 tablet, Refills: 1    Associated Diagnoses: Hyperlipidemia, unspecified hyperlipidemia type      bumetanide (BUMEX) 1 mg tablet Take 1 tablet (1 mg total) by mouth  daily  Qty: 90 tablet, Refills: 3    Comments: PT CANNOT CUT 2 MG TAB IN HALF. TOO SMALL. SO SENDING 1 MG TAB. THANKS  Associated Diagnoses: Acute on chronic diastolic congestive heart failure (HCC)      Cholecalciferol (Vitamin D3) 50 MCG CAPS Take by mouth in the morning      clotrimazole-betamethasone (LOTRISONE) 1-0.05 % cream Apply topically 2 (two) times a day for 25 days  Qty: 45 g, Refills: 1    Associated Diagnoses: Dermatophytosis      Diclofenac Sodium (VOLTAREN) 1 % Apply 2 g topically in the morning APPLY NO MORE THAN 3 DAYS IN A ROW THEN TAKE A BREAK FOR ONE WEEK FROM USE.  Qty: 100 g, Refills: 1    Associated Diagnoses: Stage 3 chronic kidney disease, unspecified whether stage 3a or 3b CKD (HCC)      Empagliflozin (Jardiance) 10 MG TABS tablet Take 1 tablet (10 mg total) by mouth every morning  Qty: 30 tablet, Refills: 0    Associated Diagnoses: Chronic diastolic (congestive) heart failure (HCC); Paroxysmal atrial fibrillation (HCC); Exertional shortness of breath      hydrOXYzine HCL (ATARAX) 10 mg tablet Take 1 tablet (10 mg total) by mouth daily at bedtime as needed (insomnia)  Qty: 90 tablet, Refills: 1    Associated Diagnoses: Other insomnia      levothyroxine 88 mcg tablet Take 1 tablet (88 mcg total) by mouth daily  Qty: 90 tablet, Refills: 1    Associated Diagnoses: Hypothyroidism, unspecified type      losartan (COZAAR) 25 mg tablet TAKE 2 TABLETS IN THE MORNING AND TAKE 1 TABLET IN THE EVENING  Qty: 270 tablet, Refills: 1    Associated Diagnoses: Benign essential HTN      Magnesium 400 MG CAPS Take by mouth in the morning Take 500 mg. BID      metoprolol tartrate (LOPRESSOR) 25 mg tablet Take 0.5 tablets (12.5 mg total) by mouth every 12 (twelve) hours  Qty: 90 tablet, Refills: 1    Associated Diagnoses: Paroxysmal atrial fibrillation (HCC); Moderate to severe mitral regurgitation; Current use of long term anticoagulation; PAC (premature atrial contraction)      pantoprazole (PROTONIX) 20 mg  tablet Take 1 tablet (20 mg total) by mouth daily  Qty: 90 tablet, Refills: 1    Associated Diagnoses: PAC (premature atrial contraction); Paroxysmal atrial fibrillation (HCC)      spironolactone (ALDACTONE) 25 mg tablet TAKE 1/2 TABLET EVERY DAY  Qty: 45 tablet, Refills: 1    Associated Diagnoses: Benign essential HTN           No discharge procedures on file.  ED SEPSIS DOCUMENTATION   Time reflects when diagnosis was documented in both MDM as applicable and the Disposition within this note       Time User Action Codes Description Comment    1/31/2025  2:38 PM Sheila Magaña [R06.09] Dyspnea on exertion     1/31/2025  2:39 PM Sheila Magaña [R79.89] Elevated brain natriuretic peptide (BNP) level     1/31/2025  3:01 PM Waldemar Hicks [M72.2] Plantar fasciitis     1/31/2025  3:01 PM Waldemar Hicks [M79.671] Right foot pain                  Sheila Magaña MD  01/31/25 4143

## 2025-01-31 NOTE — ED NOTES
Patient ambulated through unit. Patient hr 88 w/ Spo2 99%. Patient denies SOB and dizziness.           Alysia Sparrow RN  01/31/25 8251

## 2025-01-31 NOTE — TELEPHONE ENCOUNTER
Patient calling back stating she was experiencing severe shortness of breath now. Patient was having a hard time completing a full sentence due to sob. Daughter in law present with patient during time of call. I asked to speak to daughter in law and advised that she take Patient to emergency department immediately.. She verbalized understanding and is taking her to [facility] now. Please advise, thank you.

## 2025-01-31 NOTE — ASSESSMENT & PLAN NOTE
Wt Readings from Last 3 Encounters:   01/31/25 70.5 kg (155 lb 6.8 oz)   01/30/25 68.9 kg (152 lb)   01/06/25 65.3 kg (144 lb)     I/Os, daily weights  Telemetry monitoring  Patient received IV Lasix in the ED. Will resume oral Bumex tomorrow  Continue spironolactone  Consult cardiology  Cardiology to decide if nuclear stress test needs to be done sooner  Check TSH  Continue Jardiance

## 2025-01-31 NOTE — H&P
H&P - Hospitalist   Name: Rosalind Wise 84 y.o. female I MRN: [id number]  Unit/Bed#: [facility] I Date of Admission: 1/31/2025   Date of Service: 1/31/2025 I Hospital Day: 0     Assessment & Plan  SOB (shortness of breath) on exertion  Probably multifactorial in etiology  Patient has underlying diastolic CHF, atrial fibrillation and obstructive sleep apnea  Echocardiogram also showed moderate mitral regurgitation and tricuspid regurgitation  Telemetry monitoring  Consult cardiology  Acute on chronic diastolic congestive heart failure (HCC)  Wt Readings from Last 3 Encounters:   01/31/25 70.5 kg (155 lb 6.8 oz)   01/30/25 68.9 kg (152 lb)   01/06/25 65.3 kg (144 lb)     I/Os, daily weights  Telemetry monitoring  Patient received IV Lasix in the ED. Will resume oral Bumex tomorrow  Continue spironolactone  Consult cardiology  Cardiology to decide if nuclear stress test needs to be done sooner  Check TSH  Continue Jardiance  Benign essential hypertension  Continue losartan, metoprolol, spironolactone and Bumex  Hypothyroidism  Continue levothyroxine  CKD (chronic kidney disease), stage III (HCC)  Lab Results   Component Value Date    EGFR 42 01/31/2025    EGFR 42 01/03/2025    EGFR 36 10/08/2024    CREATININE 1.18 01/31/2025    CREATININE 1.17 01/03/2025    CREATININE 1.34 (H) 10/08/2024     Monitor for now  Hyperlipemia  Continue statin  WENDI (obstructive sleep apnea)  Will order CPAP  Gastroesophageal reflux disease without esophagitis  Continue PPI  Status post right frontotemporal replacement cranioplasty  History noted  Paroxysmal atrial fibrillation (HCC)  Continue metoprolol for rate control  Continue Eliquis for anticoagulation      VTE Pharmacologic Prophylaxis:   eliquis  Code Status: Prior   Discussion with family: Daughter present     Anticipated Length of Stay: Patient will be admitted on an observation basis with an anticipated length of stay of less than 2 midnights secondary to SOB    History of Present  Illness   Chief Complaint: Dyspnea on exertion    Rosalind Wise is a 84 y.o. female with a PMH of diastolic CHF, atrial fibrillation, WENDI had an office visit with cardiology yesterday where she reported exertional shortness of breath and fatigue.  She apparently had an 8 pound weight gain compared to her prior evaluation.  The patient refused to increase the dose of her Bumex and cardiology instead had ordered Jardiance to her home regimen.  The patient came to the emergency room today with complaints of worsening exertional shortness of breath.  She stated that she cannot walk even short distances without getting short of breath.  Chest x-ray done in the ED showed findings of cardiogenic versus noncardiogenic edema.  The patient was given IV Lasix in the ED. at rest the patient feels comfortable.  She denies shortness of breath at rest/chest pain/diaphoresis.  No cough.  The patient has a scheduled outpatient nuclear stress test study, which was ordered by her cardiologist yesterday.     Review of Systems   Respiratory:  Positive for shortness of breath.    All other systems reviewed and are negative.      Historical Information   Past Medical History:   Diagnosis Date    Anxiety     Arthritis     joints    Bunion     Cataract     Disease of thyroid gland     Eczema     GERD (gastroesophageal reflux disease)     Gout     Heart failure (HCC)     Hepatitis     Hiatal hernia     Hypertension     Nephrolithiasis 2017    Onychomycosis     last assessed: 16    Orthopnea     resolved: 16    Pseudogout of left wrist     Sleep apnea     wears CPAP    Stroke (HCC)      Past Surgical History:   Procedure Laterality Date    ABDOMINAL SURGERY          BRAIN HEMATOMA EVACUATION Right 2020    Procedure: Right decompressive craniectomy and evacuation of intraparenchymal hematoma;  Surgeon: Apolinar Herrera MD;  Location: [facility];  Service: Neurosurgery    BREAST SURGERY Right     cyst removal     CARPAL TUNNEL RELEASE Left     CARPAL TUNNEL RELEASE Right     CATARACT EXTRACTION       SECTION  1960    x1    COLONOSCOPY N/A 2018    Procedure: COLONOSCOPY;  Surgeon: Alejandro Carlson MD;  Location: [facility] GI LAB;  Service: Gastroenterology    DILATION AND CURETTAGE OF UTERUS  1967    EYE SURGERY Left 2006    cataracts    HERNIA REPAIR      umbilical hernia    INCISIONAL HERNIA REPAIR      incarcerated    KNEE ARTHROSCOPY Right     SC RPLCMT BONE FLAP/PROSTHETIC PLATE SKULL Right 2020    Procedure: right frontotemporal replacement cranioplasty;  Surgeon: Apolinar Herrera MD;  Location:  MAIN OR;  Service: Neurosurgery    SC XCAPSL CTRC RMVL INSJ IO LENS PROSTH W/O ECP Right 3/27/2017    Procedure: EXTRACTION EXTRACAPSULAR CATARACT PHACO INTRAOCULAR LENS (IOL);  Surgeon: Trip Gilbert MD;  Location: [facility] MAIN OR;  Service: Ophthalmology     Social History     Tobacco Use    Smoking status: Never    Smokeless tobacco: Never   Vaping Use    Vaping status: Never Used   Substance and Sexual Activity    Alcohol use: Yes     Comment: Only on special occasions    Drug use: Never    Sexual activity: Not Currently     Partners: Male     Birth control/protection: Post-menopausal     E-Cigarette/Vaping    E-Cigarette Use Never User      E-Cigarette/Vaping Substances    Nicotine No     THC No     CBD No     Flavoring No     Other No     Unknown No      Family history non-contributory  Social History:  Marital Status:        Meds/Allergies   I have reviewed home medications with patient personally.  Prior to Admission medications    Medication Sig Start Date End Date Taking? Authorizing Provider   apixaban (Eliquis) 2.5 mg TAKE 1 TABLET TWICE DAILY 24   Damon Yoon MD   atorvastatin (LIPITOR) 40 mg tablet Take 1 tablet (40 mg total) by mouth every evening 24   Suzanna Lopez MD   bumetanide (BUMEX) 1 mg tablet Take 1 tablet (1 mg total) by mouth daily 24   Ele Miller,  MD   Cholecalciferol (Vitamin D3) 50 MCG CAPS Take by mouth in the morning    Historical Provider, MD   clotrimazole-betamethasone (LOTRISONE) 1-0.05 % cream Apply topically 2 (two) times a day for 25 days 12/12/24 1/6/25  Peter Sheets DPM   Diclofenac Sodium (VOLTAREN) 1 % Apply 2 g topically in the morning APPLY NO MORE THAN 3 DAYS IN A ROW THEN TAKE A BREAK FOR ONE WEEK FROM USE. 7/18/23   Ele Miller MD   Empagliflozin (Jardiance) 10 MG TABS tablet Take 1 tablet (10 mg total) by mouth every morning 1/30/25   Damon Yoon MD   hydrOXYzine HCL (ATARAX) 10 mg tablet Take 1 tablet (10 mg total) by mouth daily at bedtime as needed (insomnia) 1/6/25   Suzanna Lopez MD   levothyroxine 88 mcg tablet Take 1 tablet (88 mcg total) by mouth daily 1/6/25   Suzanna Lopez MD   losartan (COZAAR) 25 mg tablet TAKE 2 TABLETS IN THE MORNING AND TAKE 1 TABLET IN THE EVENING 10/21/24   Ele Miller MD   Magnesium 400 MG CAPS Take by mouth in the morning Take 500 mg. BID    Historical Provider, MD   metoprolol tartrate (LOPRESSOR) 25 mg tablet Take 0.5 tablets (12.5 mg total) by mouth every 12 (twelve) hours 1/30/25   Damon Yoon MD   pantoprazole (PROTONIX) 20 mg tablet Take 1 tablet (20 mg total) by mouth daily 1/6/25   Suzanna Lopez MD   spironolactone (ALDACTONE) 25 mg tablet TAKE 1/2 TABLET EVERY DAY 1/20/25   Ele Miller MD     No Known Allergies    Objective :  Temp:  [97.6 °F (36.4 °C)-98.2 °F (36.8 °C)] 97.6 °F (36.4 °C)  HR:  [] 108  BP: (161-175)/(68-89) 167/72  Resp:  [13-22] 16  SpO2:  [96 %-98 %] 98 %  O2 Device: None (Room air)    Physical Exam  Constitutional:       Appearance: Normal appearance.   HENT:      Head: Normocephalic and atraumatic.   Eyes:      Extraocular Movements: Extraocular movements intact.      Pupils: Pupils are equal, round, and reactive to light.   Cardiovascular:      Rate and Rhythm: Normal rate and regular rhythm.   Pulmonary:      Effort: Pulmonary  effort is normal.      Breath sounds: Normal breath sounds.   Abdominal:      General: Bowel sounds are normal.      Palpations: Abdomen is soft.   Musculoskeletal:         General: Normal range of motion.      Cervical back: Neck supple.   Skin:     General: Skin is warm and dry.   Neurological:      Mental Status: She is alert and oriented to person, place, and time.   Psychiatric:         Behavior: Behavior normal.                Lab Results: I have reviewed the following results:  Results from last 7 days   Lab Units 01/31/25  1134   WBC Thousand/uL 8.10   HEMOGLOBIN g/dL 13.3   HEMATOCRIT % 41.8   PLATELETS Thousands/uL 138*   SEGS PCT % 85*   LYMPHO PCT % 8*   MONO PCT % 6   EOS PCT % 1     Results from last 7 days   Lab Units 01/31/25  1134   SODIUM mmol/L 140   POTASSIUM mmol/L 4.7   CHLORIDE mmol/L 104   CO2 mmol/L 23   BUN mg/dL 30*   CREATININE mg/dL 1.18   ANION GAP mmol/L 13   CALCIUM mg/dL 9.0   ALBUMIN g/dL 4.2   TOTAL BILIRUBIN mg/dL 0.99   ALK PHOS U/L 101   ALT U/L 20   AST U/L 33   GLUCOSE RANDOM mg/dL 94     Results from last 7 days   Lab Units 01/31/25  1134   INR  1.31*         Lab Results   Component Value Date    HGBA1C 5.8 (H) 01/03/2025    HGBA1C 6.0 (H) 07/03/2024    HGBA1C 6.0 (H) 03/27/2024         Administrative Statements     ** Please Note: This note has been constructed using a voice recognition system. **

## 2025-02-01 PROBLEM — I48.19 PERSISTENT ATRIAL FIBRILLATION (HCC): Status: ACTIVE | Noted: 2023-07-05

## 2025-02-01 LAB
ANION GAP SERPL CALCULATED.3IONS-SCNC: 15 MMOL/L (ref 4–13)
BUN SERPL-MCNC: 30 MG/DL (ref 5–25)
CALCIUM SERPL-MCNC: 8.6 MG/DL (ref 8.4–10.2)
CHLORIDE SERPL-SCNC: 105 MMOL/L (ref 96–108)
CO2 SERPL-SCNC: 20 MMOL/L (ref 21–32)
CREAT SERPL-MCNC: 1.23 MG/DL (ref 0.6–1.3)
D DIMER PPP FEU-MCNC: 0.64 UG/ML FEU
GFR SERPL CREATININE-BSD FRML MDRD: 40 ML/MIN/1.73SQ M
GLUCOSE P FAST SERPL-MCNC: 86 MG/DL (ref 65–99)
GLUCOSE SERPL-MCNC: 123 MG/DL (ref 65–140)
GLUCOSE SERPL-MCNC: 137 MG/DL (ref 65–140)
GLUCOSE SERPL-MCNC: 83 MG/DL (ref 65–140)
GLUCOSE SERPL-MCNC: 86 MG/DL (ref 65–140)
GLUCOSE SERPL-MCNC: 95 MG/DL (ref 65–140)
MAGNESIUM SERPL-MCNC: 2.1 MG/DL (ref 1.9–2.7)
POTASSIUM SERPL-SCNC: 3.8 MMOL/L (ref 3.5–5.3)
SODIUM SERPL-SCNC: 140 MMOL/L (ref 135–147)
TSH SERPL DL<=0.05 MIU/L-ACNC: 1 UIU/ML (ref 0.45–4.5)

## 2025-02-01 PROCEDURE — 82948 REAGENT STRIP/BLOOD GLUCOSE: CPT

## 2025-02-01 PROCEDURE — 83735 ASSAY OF MAGNESIUM: CPT | Performed by: NURSE PRACTITIONER

## 2025-02-01 PROCEDURE — 94660 CPAP INITIATION&MGMT: CPT

## 2025-02-01 PROCEDURE — 80048 BASIC METABOLIC PNL TOTAL CA: CPT | Performed by: NURSE PRACTITIONER

## 2025-02-01 PROCEDURE — 99232 SBSQ HOSP IP/OBS MODERATE 35: CPT | Performed by: HOSPITALIST

## 2025-02-01 PROCEDURE — 85379 FIBRIN DEGRADATION QUANT: CPT | Performed by: HOSPITALIST

## 2025-02-01 PROCEDURE — 99223 1ST HOSP IP/OBS HIGH 75: CPT | Performed by: INTERNAL MEDICINE

## 2025-02-01 PROCEDURE — 84443 ASSAY THYROID STIM HORMONE: CPT | Performed by: HOSPITALIST

## 2025-02-01 PROCEDURE — 94760 N-INVAS EAR/PLS OXIMETRY 1: CPT

## 2025-02-01 RX ORDER — FUROSEMIDE 10 MG/ML
20 INJECTION INTRAMUSCULAR; INTRAVENOUS ONCE
Status: COMPLETED | OUTPATIENT
Start: 2025-02-01 | End: 2025-02-01

## 2025-02-01 RX ADMIN — FUROSEMIDE 20 MG: 10 INJECTION, SOLUTION INTRAMUSCULAR; INTRAVENOUS at 15:37

## 2025-02-01 RX ADMIN — HYDROXYZINE HYDROCHLORIDE 10 MG: 10 TABLET ORAL at 20:20

## 2025-02-01 RX ADMIN — SPIRONOLACTONE 12.5 MG: 25 TABLET ORAL at 08:28

## 2025-02-01 RX ADMIN — Medication 12.5 MG: at 21:36

## 2025-02-01 RX ADMIN — LOSARTAN POTASSIUM 25 MG: 25 TABLET, FILM COATED ORAL at 17:05

## 2025-02-01 RX ADMIN — APIXABAN 2.5 MG: 2.5 TABLET, FILM COATED ORAL at 08:31

## 2025-02-01 RX ADMIN — BUMETANIDE 1 MG: 1 TABLET ORAL at 08:30

## 2025-02-01 RX ADMIN — APIXABAN 2.5 MG: 2.5 TABLET, FILM COATED ORAL at 17:05

## 2025-02-01 RX ADMIN — PANTOPRAZOLE SODIUM 20 MG: 20 TABLET, DELAYED RELEASE ORAL at 08:30

## 2025-02-01 RX ADMIN — Medication 12.5 MG: at 10:56

## 2025-02-01 RX ADMIN — EMPAGLIFLOZIN 10 MG: 10 TABLET, FILM COATED ORAL at 08:30

## 2025-02-01 RX ADMIN — ATORVASTATIN CALCIUM 40 MG: 40 TABLET, FILM COATED ORAL at 17:05

## 2025-02-01 RX ADMIN — LOSARTAN POTASSIUM 50 MG: 50 TABLET, FILM COATED ORAL at 08:30

## 2025-02-01 RX ADMIN — LEVOTHYROXINE SODIUM 88 MCG: 88 TABLET ORAL at 05:03

## 2025-02-01 NOTE — ASSESSMENT & PLAN NOTE
blood pressures initially elevated on presentation but have improved with diuresis  continue Cozaar 50 mg in the morning and 25 mg at bedtime  continue Lopressor 12.5 mg Q 12 hours continue Aldactone 12.5 mg daily

## 2025-02-01 NOTE — ASSESSMENT & PLAN NOTE
Wt Readings from Last 3 Encounters:   02/01/25 65.8 kg (145 lb)   01/30/25 68.9 kg (152 lb)   01/06/25 65.3 kg (144 lb)     chest x-ray concerning for pneumonitis versus pulmonary edema  improvement in respiratory status after receiving Lasix 40 mg IV times one  will give extra dose of Lasix 20 mg IV times one now and reevaluate in the a.m.  consider transitioning short acting beta blocker to long-acting beta blocker  continue Cozaar 50 mg in the morning and 25 mg in the evening  continue Aldactone 12.5 mg daily  Continue Jardiance 10 mg daily  monitor I and O, daily weights and labs  will obtain nuclear stress test on Monday 2/3/2025 due to patient's complaint of shortness of breath associated with chest heaviness  low sodium diet

## 2025-02-01 NOTE — PLAN OF CARE
Problem: PAIN - ADULT  Goal: Verbalizes/displays adequate comfort level or baseline comfort level  Description: Interventions:  - Encourage patient to monitor pain and request assistance  - Assess pain using appropriate pain scale  - Administer analgesics based on type and severity of pain and evaluate response  - Implement non-pharmacological measures as appropriate and evaluate response  - Consider cultural and social influences on pain and pain management  - Notify physician/advanced practitioner if interventions unsuccessful or patient reports new pain  Outcome: Progressing     Problem: INFECTION - ADULT  Goal: Absence or prevention of progression during hospitalization  Description: INTERVENTIONS:  - Assess and monitor for signs and symptoms of infection  - Monitor lab/diagnostic results  - Monitor all insertion sites, i.e. indwelling lines, tubes, and drains  - Monitor endotracheal if appropriate and nasal secretions for changes in amount and color  - [place] appropriate cooling/warming therapies per order  - Administer medications as ordered  - Instruct and encourage patient and family to use good hand hygiene technique  - Identify and instruct in appropriate isolation precautions for identified infection/condition  Outcome: Progressing  Goal: Absence of fever/infection during neutropenic period  Description: INTERVENTIONS:  - Monitor WBC    Outcome: Progressing     Problem: SAFETY ADULT  Goal: Patient will remain free of falls  Description: INTERVENTIONS:  - Educate patient/family on patient safety including physical limitations  - Instruct patient to call for assistance with activity   - Consult OT/PT to assist with strengthening/mobility   - Keep Call bell within reach  - Keep bed low and locked with side rails adjusted as appropriate  - Keep care items and personal belongings within reach  - Initiate and maintain comfort rounds  - Make Fall Risk Sign visible to staff  - Offer Toileting every 2 Hours,  in advance of need  - Initiate/Maintain chair/bed alarm  - Obtain necessary fall risk management equipment: walker  - Apply yellow socks and bracelet for high fall risk patients  - Consider moving patient to room near nurses station  Outcome: Progressing  Goal: Maintain or return to baseline ADL function  Description: INTERVENTIONS:  -  Assess patient's ability to carry out ADLs; assess patient's baseline for ADL function and identify physical deficits which impact ability to perform ADLs (bathing, care of mouth/teeth, toileting, grooming, dressing, etc.)  - Assess/evaluate cause of self-care deficits   - Assess range of motion  - Assess patient's mobility; develop plan if impaired  - Assess patient's need for assistive devices and provide as appropriate  - Encourage maximum independence but intervene and supervise when necessary  - Involve family in performance of ADLs  - Assess for home care needs following discharge   - Consider OT consult to assist with ADL evaluation and planning for discharge  - Provide patient education as appropriate  Outcome: Progressing  Goal: Maintains/Returns to pre admission functional level  Description: INTERVENTIONS:  - Perform AM-PAC 6 Click Basic Mobility/ Daily Activity assessment daily.  - Set and communicate daily mobility goal to care team and patient/family/caregiver.   - Collaborate with rehabilitation services on mobility goals if consulted  - Perform Range of Motion 2 times a day.  - Reposition patient every 2 hours.  - Dangle patient 2 times a day  - Stand patient 2 times a day  - Ambulate patient 2 times a day  - Out of bed to chair 2 times a day   - Out of bed for meals 2 times a day  - Out of bed for toileting  - Record patient progress and toleration of activity level   Outcome: Progressing     Problem: DISCHARGE PLANNING  Goal: Discharge to home or other facility with appropriate resources  Description: INTERVENTIONS:  - Identify barriers to discharge w/patient and  caregiver  - Arrange for needed discharge resources and transportation as appropriate  - Identify discharge learning needs (meds, wound care, etc.)  - Arrange for interpretive services to assist at discharge as needed  - Refer to Case Management Department for coordinating discharge planning if the patient needs post-hospital services based on physician/advanced practitioner order or complex needs related to functional status, cognitive ability, or social support system  Outcome: Progressing     Problem: Knowledge Deficit  Goal: Patient/family/caregiver demonstrates understanding of disease process, treatment plan, medications, and discharge instructions  Description: Complete learning assessment and assess knowledge base.  Interventions:  - Provide teaching at level of understanding  - Provide teaching via preferred learning methods  Outcome: Progressing     Problem: RESPIRATORY - ADULT  Goal: Achieves optimal ventilation and oxygenation  Description: INTERVENTIONS:  - Assess for changes in respiratory status  - Assess for changes in mentation and behavior  - Position to facilitate oxygenation and minimize respiratory effort  - Oxygen administered by appropriate delivery if ordered  - Initiate smoking cessation education as indicated  - Encourage broncho-pulmonary hygiene including cough, deep breathe, Incentive Spirometry  - Assess the need for suctioning and aspirate as needed  - Assess and instruct to report SOB or any respiratory difficulty  - Respiratory Therapy support as indicated  Outcome: Progressing

## 2025-02-01 NOTE — ASSESSMENT & PLAN NOTE
Wt Readings from Last 3 Encounters:   02/01/25 65.8 kg (145 lb)   01/30/25 68.9 kg (152 lb)   01/06/25 65.3 kg (144 lb)     I/Os, daily weights  Telemetry monitoring  Patient received IV Lasix in the ED. Will resume oral Bumex tomorrow  Continue spironolactone  Consult cardiology  Cardiology to decide if nuclear stress test needs to be done sooner  Check TSH  Continue Jardiance

## 2025-02-01 NOTE — ASSESSMENT & PLAN NOTE
Lab Results   Component Value Date    EGFR 40 02/01/2025    EGFR 42 01/31/2025    EGFR 42 01/03/2025    CREATININE 1.23 02/01/2025    CREATININE 1.18 01/31/2025    CREATININE 1.17 01/03/2025   appears to be at her baseline.   Will continue to monitor

## 2025-02-01 NOTE — CONSULTS
Consultation - Cardiology   Name: Rosalind Wise 84 y.o. female I MRN: [id number]  Unit/Bed#: [address] [place] [address] I Date of Admission: 1/31/2025   Date of Service: 2/1/2025 I Hospital Day: 0   Inpatient consult to Cardiology  Consult performed by: HIEU Schafer  Consult ordered by: Waldemar Hicks MD        Physician Requesting Evaluation: Waldemar Hicks MD   Reason for Evaluation / Principal Problem: short of breath    Assessment & Plan  Acute on chronic diastolic congestive heart failure (HCC)  Wt Readings from Last 3 Encounters:   02/01/25 65.8 kg (145 lb)   01/30/25 68.9 kg (152 lb)   01/06/25 65.3 kg (144 lb)     chest x-ray concerning for pneumonitis versus pulmonary edema  improvement in respiratory status after receiving Lasix 40 mg IV times one  will give extra dose of Lasix 20 mg IV times one now and reevaluate in the a.m.  consider transitioning short acting beta blocker to long-acting beta blocker  continue Cozaar 50 mg in the morning and 25 mg in the evening  continue Aldactone 12.5 mg daily  Continue Jardiance 10 mg daily  monitor I and O, daily weights and labs  will obtain nuclear stress test on Monday 2/3/2025 due to patient's complaint of shortness of breath associated with chest heaviness  low sodium diet  Benign essential hypertension  blood pressures initially elevated on presentation but have improved with diuresis  continue Cozaar 50 mg in the morning and 25 mg at bedtime  continue Lopressor 12.5 mg Q 12 hours continue Aldactone 12.5 mg daily  Persistent atrial fibrillation (HCC)  heart rates controlled  continue Lopressor 12.5 mg Q 12 hours  Continue Eliquis 5 mg bid  CKD (chronic kidney disease), stage III (HCC)  Lab Results   Component Value Date    EGFR 40 02/01/2025    EGFR 42 01/31/2025    EGFR 42 01/03/2025    CREATININE 1.23 02/01/2025    CREATININE 1.18 01/31/2025    CREATININE 1.17 01/03/2025   appears to be at her baseline.   Will continue to  monitor  Hypothyroidism  continue levothyroxine 88 µg daily  WENDI (obstructive sleep apnea)  continue CPAP use      History of Present Illness   Rosalind Wise is a 84 y.o. female who presented to the emergency room with complaints of profound shortness of breath and inability to perform activities or walk more than 5 feet. The day prior to her presentation she was actually seen in our office with Dr. Yoon. During that office visit he wanted to increase her diuretics because she was complaining of shortness of breath, poor activity tolerance and intermittent left shoulder and arm pain. Patient also noted to me that she was having early satiety and not really eating much. She was reluctant to increase her diuretic so he initiated SGL T2 inhibitor therapy with Jardiance. She said the next day she felt worse and called her doctor who referred her to the emergency room.    In the emergency room patient received Lasix 40 mg IV times one and notes some improvement in her symptomatology though she does not feel that she is at her baseline. Patient had her usual dose of Bumex today so will give her an extra 20 mg of IV Lasix now and reevaluate in the a.m.    Patient has a past history for chronic diastolic heart failure, persistent atrial fibrillation, hypertension, dyslipidemia, Gerd, and gout.    Review of Systems   Constitutional:  Positive for activity change and fatigue.   HENT: Negative.  Negative for congestion, ear pain, postnasal drip and sore throat.    Eyes: Negative.  Negative for photophobia and visual disturbance.   Respiratory:  Positive for cough, chest tightness and shortness of breath.    Cardiovascular: Negative.  Negative for chest pain, palpitations and leg swelling.   Gastrointestinal: Negative.  Negative for abdominal distention, diarrhea, nausea and vomiting.   Endocrine: Negative.  Negative for polydipsia, polyphagia and polyuria.   Genitourinary: Negative.  Negative for difficulty urinating.    Musculoskeletal: Negative.    Skin: Negative.    Neurological:  Negative for dizziness, syncope, weakness and light-headedness.   Hematological: Negative.    Psychiatric/Behavioral: Negative.       I have reviewed the patient's PMH, PSH, Social History, Family History, Meds, and Allergies  Historical Information   Past Medical History:   Diagnosis Date    Anxiety     Arthritis     joints    Bunion     Cataract     Disease of thyroid gland     Eczema     GERD (gastroesophageal reflux disease)     Gout     Heart failure (HCC)     Hepatitis     Hiatal hernia     Hypertension     Nephrolithiasis 2017    Onychomycosis     last assessed: 16    Orthopnea     resolved: 16    Pseudogout of left wrist     Sleep apnea     wears CPAP    Stroke (HCC)      Past Surgical History:   Procedure Laterality Date    ABDOMINAL SURGERY          BRAIN HEMATOMA EVACUATION Right 2020    Procedure: Right decompressive craniectomy and evacuation of intraparenchymal hematoma;  Surgeon: Apolinar Herrera MD;  Location: [facility];  Service: Neurosurgery    BREAST SURGERY Right     cyst removal    CARPAL TUNNEL RELEASE Left 1989    CARPAL TUNNEL RELEASE Right 2004    CATARACT EXTRACTION       SECTION  1960    x1    COLONOSCOPY N/A 2018    Procedure: COLONOSCOPY;  Surgeon: Alejandro Carlson MD;  Location: [facility] GI LAB;  Service: Gastroenterology    DILATION AND CURETTAGE OF UTERUS  1967    EYE SURGERY Left 2006    cataracts    HERNIA REPAIR      umbilical hernia    INCISIONAL HERNIA REPAIR      incarcerated    KNEE ARTHROSCOPY Right     WY RPLCMT BONE FLAP/PROSTHETIC PLATE SKULL Right 2020    Procedure: right frontotemporal replacement cranioplasty;  Surgeon: Apolinar Herrera MD;  Location: [facility] OR;  Service: Neurosurgery    WY XCAPSL CTRC RMVL INSJ IO LENS PROSTH W/O ECP Right 3/27/2017    Procedure: EXTRACTION EXTRACAPSULAR CATARACT PHACO INTRAOCULAR LENS (IOL);  Surgeon: Trip Gilbert MD;   Location: [facility] MAIN OR;  Service: Ophthalmology     Social History     Tobacco Use    Smoking status: Never    Smokeless tobacco: Never   Vaping Use    Vaping status: Never Used   Substance and Sexual Activity    Alcohol use: Yes     Comment: Only on special occasions    Drug use: Never    Sexual activity: Not Currently     Partners: Male     Birth control/protection: Post-menopausal     E-Cigarette/Vaping    E-Cigarette Use Never User      E-Cigarette/Vaping Substances    Nicotine No     THC No     CBD No     Flavoring No     Other No     Unknown No      Family History   Problem Relation Age of Onset    Diabetes Mother     Coronary artery disease Mother     Arthritis Mother     Hypertension Mother     Hypertension Father     Diabetes Brother     Diabetes Son     Arthritis Family      Social History     Tobacco Use    Smoking status: Never    Smokeless tobacco: Never   Vaping Use    Vaping status: Never Used   Substance and Sexual Activity    Alcohol use: Yes     Comment: Only on special occasions    Drug use: Never    Sexual activity: Not Currently     Partners: Male     Birth control/protection: Post-menopausal       Current Facility-Administered Medications:     apixaban (ELIQUIS) tablet 2.5 mg, BID    atorvastatin (LIPITOR) tablet 40 mg, QPM    bumetanide (BUMEX) tablet 1 mg, Daily    Empagliflozin (JARDIANCE) tablet 10 mg, QAM    hydrOXYzine HCL (ATARAX) tablet 10 mg, HS PRN    insulin lispro (HumALOG/ADMELOG) 100 units/mL subcutaneous injection 1-5 Units, TID AC **AND** Fingerstick Glucose (POCT), TID AC    levothyroxine tablet 88 mcg, Early Morning    losartan (COZAAR) tablet 25 mg, QPM    losartan (COZAAR) tablet 50 mg, Daily    melatonin tablet 3 mg, HS PRN    metoprolol tartrate (LOPRESSOR) partial tablet 12.5 mg, Q12H    pantoprazole (PROTONIX) EC tablet 20 mg, Daily    spironolactone (ALDACTONE) tablet 12.5 mg, Daily  Prior to Admission Medications   Prescriptions Last Dose Informant Patient Reported?  Taking?   Cholecalciferol (Vitamin D3) 50 MCG CAPS 1/31/2025 Self Yes Yes   Sig: Take by mouth in the morning   Diclofenac Sodium (VOLTAREN) 1 % 1/30/2025 Self No Yes   Sig: Apply 2 g topically in the morning APPLY NO MORE THAN 3 DAYS IN A ROW THEN TAKE A BREAK FOR ONE WEEK FROM USE.   Empagliflozin (Jardiance) 10 MG TABS tablet 1/31/2025  No Yes   Sig: Take 1 tablet (10 mg total) by mouth every morning   Magnesium 400 MG CAPS 1/31/2025 Self Yes Yes   Sig: Take by mouth in the morning Take 500 mg. BID   apixaban (Eliquis) 2.5 mg 1/31/2025 Morning Self No Yes   Sig: TAKE 1 TABLET TWICE DAILY   atorvastatin (LIPITOR) 40 mg tablet 1/30/2025 Self No Yes   Sig: Take 1 tablet (40 mg total) by mouth every evening   bumetanide (BUMEX) 1 mg tablet 1/31/2025 Self No Yes   Sig: Take 1 tablet (1 mg total) by mouth daily   clotrimazole-betamethasone (LOTRISONE) 1-0.05 % cream   No No   Sig: Apply topically 2 (two) times a day for 25 days   hydrOXYzine HCL (ATARAX) 10 mg tablet 1/30/2025 Morning Self No Yes   Sig: Take 1 tablet (10 mg total) by mouth daily at bedtime as needed (insomnia)   levothyroxine 88 mcg tablet 1/31/2025 Self No Yes   Sig: Take 1 tablet (88 mcg total) by mouth daily   losartan (COZAAR) 25 mg tablet  Self No No   Sig: TAKE 2 TABLETS IN THE MORNING AND TAKE 1 TABLET IN THE EVENING   metoprolol tartrate (LOPRESSOR) 25 mg tablet 1/31/2025  No Yes   Sig: Take 0.5 tablets (12.5 mg total) by mouth every 12 (twelve) hours   pantoprazole (PROTONIX) 20 mg tablet 1/31/2025 Self No Yes   Sig: Take 1 tablet (20 mg total) by mouth daily   spironolactone (ALDACTONE) 25 mg tablet 1/31/2025 Self No Yes   Sig: TAKE 1/2 TABLET EVERY DAY      Facility-Administered Medications Last Administration Doses Remaining   triamcinolone acetonide (KENALOG-40) 40 mg/mL injection 20 mg 10/3/2024 10:45 AM    triamcinolone acetonide (Kenalog-40) 40 mg/mL injection 20 mg 12/12/2024 11:15 AM         Patient has no known  allergies.    Objective :  Temp:  [97.6 °F (36.4 °C)-98.2 °F (36.8 °C)] 97.8 °F (36.6 °C)  HR:  [] 86  BP: (119-167)/(65-85) 135/65  Resp:  [15-19] 19  SpO2:  [90 %-98 %] 97 %  O2 Device: None (Room air)  Orthostatic Blood Pressures      Flowsheet Row Most Recent Value   Blood Pressure 135/65 filed at 02/01/2025 0754   Patient Position - Orthostatic VS Lying filed at 01/31/2025 1535          First Weight: Weight - Scale: 70.5 kg (155 lb 6.8 oz) (01/31/25 1100)  Vitals:    02/01/25 0446 02/01/25 0600   Weight: 65.8 kg (145 lb) 65.8 kg (145 lb)       Physical Exam  Vitals and nursing note reviewed.   Constitutional:       General: She is not in acute distress.     Appearance: She is well-developed. She is not ill-appearing.   HENT:      Right Ear: External ear normal.      Left Ear: External ear normal.   Eyes:      General: No scleral icterus.        Right eye: No discharge.         Left eye: No discharge.   Cardiovascular:      Rate and Rhythm: Normal rate and regular rhythm.   Pulmonary:      Effort: Pulmonary effort is normal. No accessory muscle usage or respiratory distress.      Breath sounds: Examination of the right-lower field reveals decreased breath sounds. Examination of the left-lower field reveals decreased breath sounds. Decreased breath sounds and wheezing present.   Abdominal:      General: Bowel sounds are normal. There is no distension.      Palpations: Abdomen is soft.   Musculoskeletal:      Right lower leg: Edema present.      Left lower leg: Edema present.   Skin:     General: Skin is warm and dry.      Capillary Refill: Capillary refill takes less than 2 seconds.   Neurological:      Mental Status: She is alert and oriented to person, place, and time. Mental status is at baseline.   Psychiatric:         Mood and Affect: Mood normal.           Lab Results: I have reviewed the following results:  Results from last 7 days   Lab Units 01/31/25  1134   WBC Thousand/uL 8.10   HEMOGLOBIN g/dL  13.3   HEMATOCRIT % 41.8   PLATELETS Thousands/uL 138*     Results from last 7 days   Lab Units 02/01/25  0431 01/31/25  1134   POTASSIUM mmol/L 3.8 4.7   CHLORIDE mmol/L 105 104   CO2 mmol/L 20* 23   BUN mg/dL 30* 30*   CREATININE mg/dL 1.23 1.18   CALCIUM mg/dL 8.6 9.0     Results from last 7 days   Lab Units 01/31/25  1134   INR  1.31*   PTT seconds 34     Lab Results   Component Value Date    HGBA1C 5.8 (H) 01/03/2025     Lab Results   Component Value Date    TROPONINI 0.07 (H) 03/25/2021       12 lead EKG demonstrates atrial fibrillation with a right bundle branch block    VTE Prophylaxis: VTE covered by:  apixaban, Oral, 2.5 mg at 02/01/25 0831       Em HAGEN  Cardiology

## 2025-02-01 NOTE — ASSESSMENT & PLAN NOTE
Lab Results   Component Value Date    EGFR 40 02/01/2025    EGFR 42 01/31/2025    EGFR 42 01/03/2025    CREATININE 1.23 02/01/2025    CREATININE 1.18 01/31/2025    CREATININE 1.17 01/03/2025     Monitor for now

## 2025-02-01 NOTE — ASSESSMENT & PLAN NOTE
Probably multifactorial in etiology  Patient has underlying diastolic CHF, atrial fibrillation and obstructive sleep apnea  Echocardiogram also showed moderate mitral regurgitation and tricuspid regurgitation  Telemetry monitoring  Cardiology consultation

## 2025-02-01 NOTE — PROGRESS NOTES
Progress Note - Hospitalist   Name: Rosalind Wise 84 y.o. female I MRN: [id number]  Unit/Bed#: [address] [place] [address] Date of Admission: 1/31/2025   Date of Service: 2/1/2025 I Hospital Day: 0    Assessment & Plan  SOB (shortness of breath) on exertion  Probably multifactorial in etiology  Patient has underlying diastolic CHF, atrial fibrillation and obstructive sleep apnea  Echocardiogram also showed moderate mitral regurgitation and tricuspid regurgitation  Telemetry monitoring  Cardiology consultation  Acute on chronic diastolic congestive heart failure (HCC)  Wt Readings from Last 3 Encounters:   02/01/25 65.8 kg (145 lb)   01/30/25 68.9 kg (152 lb)   01/06/25 65.3 kg (144 lb)     I/Os, daily weights  Telemetry monitoring  Patient received IV Lasix in the ED. Will resume oral Bumex tomorrow  Continue spironolactone  Consult cardiology  Cardiology to decide if nuclear stress test needs to be done sooner  Check TSH  Continue Jardiance  Benign essential hypertension  Continue losartan, metoprolol, spironolactone and Bumex  Hypothyroidism  Continue levothyroxine  CKD (chronic kidney disease), stage III (HCC)  Lab Results   Component Value Date    EGFR 40 02/01/2025    EGFR 42 01/31/2025    EGFR 42 01/03/2025    CREATININE 1.23 02/01/2025    CREATININE 1.18 01/31/2025    CREATININE 1.17 01/03/2025     Monitor for now  Hyperlipemia  Continue statin  WENDI (obstructive sleep apnea)  Will order CPAP  Gastroesophageal reflux disease without esophagitis  Continue PPI  Status post right frontotemporal replacement cranioplasty  History noted  Paroxysmal atrial fibrillation (HCC)  Continue metoprolol for rate control  Continue Eliquis for anticoagulation    VTE Pharmacologic Prophylaxis:   eliquis    Mobility:   Basic Mobility Inpatient Raw Score: 22  -HLM Goal: 7: Walk 25 feet or more  -HLM Achieved: 1: Laying in bed    Patient Centered Rounds: I performed bedside rounds with nursing staff today.   Discussions with  Specialists or Other Care Team Provider: RN    Current Length of Stay: 0 day(s)  Current Patient Status: Observation     Code Status: Prior    Subjective   Dyspnea on exertion    Objective :  Temp:  [97.6 °F (36.4 °C)-98.2 °F (36.8 °C)] 97.8 °F (36.6 °C)  HR:  [] 86  BP: (119-167)/(65-85) 135/65  Resp:  [15-19] 19  SpO2:  [90 %-98 %] 97 %  O2 Device: None (Room air)    Body mass index is 28.32 kg/m².     Input and Output Summary (last 24 hours):     Intake/Output Summary (Last 24 hours) at 2/1/2025 1249  Last data filed at 2/1/2025 1001  Gross per 24 hour   Intake 200 ml   Output 2100 ml   Net -1900 ml       Physical Exam  Constitutional:       Appearance: Normal appearance.   HENT:      Head: Normocephalic and atraumatic.   Eyes:      Extraocular Movements: Extraocular movements intact.      Pupils: Pupils are equal, round, and reactive to light.   Cardiovascular:      Rate and Rhythm: Normal rate and regular rhythm.   Pulmonary:      Effort: Pulmonary effort is normal.      Breath sounds: Normal breath sounds.   Abdominal:      General: Bowel sounds are normal.      Palpations: Abdomen is soft.   Musculoskeletal:         General: Normal range of motion.      Cervical back: Neck supple.   Skin:     General: Skin is warm and dry.   Neurological:      Mental Status: She is alert and oriented to person, place, and time.   Psychiatric:         Behavior: Behavior normal.             Telemetry:  Telemetry Orders (From admission, onward)               24 Hour Telemetry Monitoring  Continuous x 24 Hours (Telem)        Expiring   Question:  Reason for 24 Hour Telemetry  Answer:  Decompensated CHF- and any one of the following: continuous diuretic infusion or total diuretic dose >200 mg daily, associated electrolyte derangement (I.e. K < 3.0), inotropic drip (continuous infusion), hx of ventricular arrhythmia, or new EF < 35%                                Lab Results: I have reviewed the following results:   Results  from last 7 days   Lab Units 01/31/25  1134   WBC Thousand/uL 8.10   HEMOGLOBIN g/dL 13.3   HEMATOCRIT % 41.8   PLATELETS Thousands/uL 138*   SEGS PCT % 85*   LYMPHO PCT % 8*   MONO PCT % 6   EOS PCT % 1     Results from last 7 days   Lab Units 02/01/25  0431 01/31/25  1134   SODIUM mmol/L 140 140   POTASSIUM mmol/L 3.8 4.7   CHLORIDE mmol/L 105 104   CO2 mmol/L 20* 23   BUN mg/dL 30* 30*   CREATININE mg/dL 1.23 1.18   ANION GAP mmol/L 15* 13   CALCIUM mg/dL 8.6 9.0   ALBUMIN g/dL  --  4.2   TOTAL BILIRUBIN mg/dL  --  0.99   ALK PHOS U/L  --  101   ALT U/L  --  20   AST U/L  --  33   GLUCOSE RANDOM mg/dL 86 94     Results from last 7 days   Lab Units 01/31/25  1134   INR  1.31*     Results from last 7 days   Lab Units 02/01/25  1144 02/01/25  0742 01/31/25  2030 01/31/25  1658   POC GLUCOSE mg/dl 137 83 114 88               Recent Cultures (last 7 days):         Last 24 Hours Medication List:     Current Facility-Administered Medications:     apixaban (ELIQUIS) tablet 2.5 mg, BID    atorvastatin (LIPITOR) tablet 40 mg, QPM    bumetanide (BUMEX) tablet 1 mg, Daily    Empagliflozin (JARDIANCE) tablet 10 mg, QAM    hydrOXYzine HCL (ATARAX) tablet 10 mg, HS PRN    insulin lispro (HumALOG/ADMELOG) 100 units/mL subcutaneous injection 1-5 Units, TID AC **AND** Fingerstick Glucose (POCT), TID AC    levothyroxine tablet 88 mcg, Early Morning    losartan (COZAAR) tablet 25 mg, QPM    losartan (COZAAR) tablet 50 mg, Daily    melatonin tablet 3 mg, HS PRN    metoprolol tartrate (LOPRESSOR) partial tablet 12.5 mg, Q12H    pantoprazole (PROTONIX) EC tablet 20 mg, Daily    spironolactone (ALDACTONE) tablet 12.5 mg, Daily    Administrative Statements   Today, Patient Was Seen By: Waldemar Hicks MD      **Please Note: This note may have been constructed using a voice recognition system.**

## 2025-02-02 LAB
ANION GAP SERPL CALCULATED.3IONS-SCNC: 14 MMOL/L (ref 4–13)
BASOPHILS # BLD AUTO: 0.02 THOUSANDS/ΜL (ref 0–0.1)
BASOPHILS NFR BLD AUTO: 0 % (ref 0–1)
BUN SERPL-MCNC: 34 MG/DL (ref 5–25)
CALCIUM SERPL-MCNC: 8.5 MG/DL (ref 8.4–10.2)
CHLORIDE SERPL-SCNC: 104 MMOL/L (ref 96–108)
CO2 SERPL-SCNC: 23 MMOL/L (ref 21–32)
CREAT SERPL-MCNC: 1.27 MG/DL (ref 0.6–1.3)
EOSINOPHIL # BLD AUTO: 0.08 THOUSAND/ΜL (ref 0–0.61)
EOSINOPHIL NFR BLD AUTO: 1 % (ref 0–6)
ERYTHROCYTE [DISTWIDTH] IN BLOOD BY AUTOMATED COUNT: 14.3 % (ref 11.6–15.1)
GFR SERPL CREATININE-BSD FRML MDRD: 38 ML/MIN/1.73SQ M
GLUCOSE SERPL-MCNC: 104 MG/DL (ref 65–140)
GLUCOSE SERPL-MCNC: 113 MG/DL (ref 65–140)
GLUCOSE SERPL-MCNC: 116 MG/DL (ref 65–140)
GLUCOSE SERPL-MCNC: 137 MG/DL (ref 65–140)
GLUCOSE SERPL-MCNC: 81 MG/DL (ref 65–140)
HCT VFR BLD AUTO: 37.3 % (ref 34.8–46.1)
HGB BLD-MCNC: 12.2 G/DL (ref 11.5–15.4)
IMM GRANULOCYTES # BLD AUTO: 0.01 THOUSAND/UL (ref 0–0.2)
IMM GRANULOCYTES NFR BLD AUTO: 0 % (ref 0–2)
LYMPHOCYTES # BLD AUTO: 0.89 THOUSANDS/ΜL (ref 0.6–4.47)
LYMPHOCYTES NFR BLD AUTO: 14 % (ref 14–44)
MAGNESIUM SERPL-MCNC: 2.1 MG/DL (ref 1.9–2.7)
MCH RBC QN AUTO: 28.5 PG (ref 26.8–34.3)
MCHC RBC AUTO-ENTMCNC: 32.7 G/DL (ref 31.4–37.4)
MCV RBC AUTO: 87 FL (ref 82–98)
MONOCYTES # BLD AUTO: 0.68 THOUSAND/ΜL (ref 0.17–1.22)
MONOCYTES NFR BLD AUTO: 11 % (ref 4–12)
NEUTROPHILS # BLD AUTO: 4.63 THOUSANDS/ΜL (ref 1.85–7.62)
NEUTS SEG NFR BLD AUTO: 74 % (ref 43–75)
NRBC BLD AUTO-RTO: 0 /100 WBCS
PLATELET # BLD AUTO: 148 THOUSANDS/UL (ref 149–390)
PMV BLD AUTO: 10.9 FL (ref 8.9–12.7)
POTASSIUM SERPL-SCNC: 3.8 MMOL/L (ref 3.5–5.3)
RBC # BLD AUTO: 4.28 MILLION/UL (ref 3.81–5.12)
SODIUM SERPL-SCNC: 141 MMOL/L (ref 135–147)
WBC # BLD AUTO: 6.31 THOUSAND/UL (ref 4.31–10.16)

## 2025-02-02 PROCEDURE — 80048 BASIC METABOLIC PNL TOTAL CA: CPT | Performed by: HOSPITALIST

## 2025-02-02 PROCEDURE — 82948 REAGENT STRIP/BLOOD GLUCOSE: CPT

## 2025-02-02 PROCEDURE — 99232 SBSQ HOSP IP/OBS MODERATE 35: CPT | Performed by: HOSPITALIST

## 2025-02-02 PROCEDURE — 85025 COMPLETE CBC W/AUTO DIFF WBC: CPT | Performed by: HOSPITALIST

## 2025-02-02 PROCEDURE — 94760 N-INVAS EAR/PLS OXIMETRY 1: CPT

## 2025-02-02 PROCEDURE — 94660 CPAP INITIATION&MGMT: CPT

## 2025-02-02 PROCEDURE — 83735 ASSAY OF MAGNESIUM: CPT | Performed by: HOSPITALIST

## 2025-02-02 RX ORDER — BUMETANIDE 1 MG/1
1 TABLET ORAL DAILY
Status: DISCONTINUED | OUTPATIENT
Start: 2025-02-02 | End: 2025-02-04 | Stop reason: HOSPADM

## 2025-02-02 RX ADMIN — BUMETANIDE 1 MG: 1 TABLET ORAL at 08:33

## 2025-02-02 RX ADMIN — EMPAGLIFLOZIN 10 MG: 10 TABLET, FILM COATED ORAL at 08:34

## 2025-02-02 RX ADMIN — SPIRONOLACTONE 12.5 MG: 25 TABLET ORAL at 08:34

## 2025-02-02 RX ADMIN — Medication 12.5 MG: at 14:09

## 2025-02-02 RX ADMIN — HYDROXYZINE HYDROCHLORIDE 10 MG: 10 TABLET ORAL at 21:18

## 2025-02-02 RX ADMIN — ATORVASTATIN CALCIUM 40 MG: 40 TABLET, FILM COATED ORAL at 17:24

## 2025-02-02 RX ADMIN — PANTOPRAZOLE SODIUM 20 MG: 20 TABLET, DELAYED RELEASE ORAL at 08:33

## 2025-02-02 RX ADMIN — LEVOTHYROXINE SODIUM 88 MCG: 88 TABLET ORAL at 06:15

## 2025-02-02 RX ADMIN — APIXABAN 2.5 MG: 2.5 TABLET, FILM COATED ORAL at 08:34

## 2025-02-02 RX ADMIN — LOSARTAN POTASSIUM 50 MG: 50 TABLET, FILM COATED ORAL at 08:34

## 2025-02-02 RX ADMIN — APIXABAN 2.5 MG: 2.5 TABLET, FILM COATED ORAL at 17:25

## 2025-02-02 NOTE — ASSESSMENT & PLAN NOTE
Wt Readings from Last 3 Encounters:   02/02/25 65.8 kg (145 lb)   01/30/25 68.9 kg (152 lb)   01/06/25 65.3 kg (144 lb)     I/Os, daily weights  Telemetry monitoring  Patient received IV Lasix in the ED. Will resume oral Bumex tomorrow  Continue spironolactone  Consult cardiology  Cardiology to decide if nuclear stress test needs to be done sooner  Check TSH  Continue Jardiance

## 2025-02-02 NOTE — ASSESSMENT & PLAN NOTE
continue levothyroxine 88 µg daily   Bilobed Transposition Flap Text: The defect edges were debeveled with a #15 scalpel blade.  Given the location of the defect and the proximity to free margins a bilobed transposition flap was deemed most appropriate.  Using a sterile surgical marker, an appropriate bilobe flap drawn around the defect.    The area thus outlined was incised deep to adipose tissue with a #15 scalpel blade.  The skin margins were undermined to an appropriate distance in all directions utilizing iris scissors.

## 2025-02-02 NOTE — PROGRESS NOTES
Progress Note - Hospitalist   Name: Rosalind Wise 84 y.o. female I MRN: [id number]  Unit/Bed#: [address] [place] [address] Date of Admission: 1/31/2025   Date of Service: 2/2/2025 I Hospital Day: 1    Assessment & Plan  SOB (shortness of breath) on exertion  Probably multifactorial in etiology  Patient has underlying diastolic CHF, atrial fibrillation and obstructive sleep apnea  Echocardiogram also showed moderate mitral regurgitation and tricuspid regurgitation  Telemetry monitoring  Cardiology consultation appreciated.  They plan on nuclear stress test tomorrow  Acute on chronic diastolic congestive heart failure (HCC)  Wt Readings from Last 3 Encounters:   02/02/25 65.8 kg (145 lb)   01/30/25 68.9 kg (152 lb)   01/06/25 65.3 kg (144 lb)     I/Os, daily weights  Telemetry monitoring  Patient received IV Lasix in the ED. Will resume oral Bumex tomorrow  Continue spironolactone  Consult cardiology  Cardiology to decide if nuclear stress test needs to be done sooner  Check TSH  Continue Jardiance  Benign essential hypertension  Continue losartan, metoprolol, spironolactone and Bumex  Hypothyroidism  Continue levothyroxine  CKD (chronic kidney disease), stage III (HCC)  Lab Results   Component Value Date    EGFR 38 02/02/2025    EGFR 40 02/01/2025    EGFR 42 01/31/2025    CREATININE 1.27 02/02/2025    CREATININE 1.23 02/01/2025    CREATININE 1.18 01/31/2025     Monitor for now  Hyperlipemia  Continue statin  WENDI (obstructive sleep apnea)  Will order CPAP  Gastroesophageal reflux disease without esophagitis  Continue PPI  Status post right frontotemporal replacement cranioplasty  History noted  Persistent atrial fibrillation (HCC)  Continue metoprolol for rate control  Continue Eliquis for anticoagulation    VTE Pharmacologic Prophylaxis:   eliquis    Mobility:   Basic Mobility Inpatient Raw Score: 20  JH-HLM Goal: 6: Walk 10 steps or more  JH-HLM Achieved: 7: Walk 25 feet or more    Patient Centered Rounds: I performed  bedside rounds with nursing staff today.   Discussions with Specialists or Other Care Team Provider: RN    Current Length of Stay: 1 day(s)  Current Patient Status: Inpatient     Code Status: Prior    Subjective   Dyspnea on exertion    Objective :  Temp:  [98 °F (36.7 °C)-98.5 °F (36.9 °C)] 98.1 °F (36.7 °C)  HR:  [] 85  BP: (116-134)/(65-71) 134/68  Resp:  [17-18] 17  SpO2:  [94 %-98 %] 94 %  O2 Device: CPAP    Body mass index is 28.32 kg/m².     Input and Output Summary (last 24 hours):     Intake/Output Summary (Last 24 hours) at 2/2/2025 1102  Last data filed at 2/2/2025 0601  Gross per 24 hour   Intake 240 ml   Output 1400 ml   Net -1160 ml       Telemetry:  Telemetry Orders (From admission, onward)               24 Hour Telemetry Monitoring  Continuous x 24 Hours (Telem)        Expiring   Question:  Reason for 24 Hour Telemetry  Answer:  Decompensated CHF- and any one of the following: continuous diuretic infusion or total diuretic dose >200 mg daily, associated electrolyte derangement (I.e. K < 3.0), inotropic drip (continuous infusion), hx of ventricular arrhythmia, or new EF < 35%                                Lab Results: I have reviewed the following results:   Results from last 7 days   Lab Units 02/02/25  0623   WBC Thousand/uL 6.31   HEMOGLOBIN g/dL 12.2   HEMATOCRIT % 37.3   PLATELETS Thousands/uL 148*   SEGS PCT % 74   LYMPHO PCT % 14   MONO PCT % 11   EOS PCT % 1     Results from last 7 days   Lab Units 02/02/25  0623 02/01/25  0431 01/31/25  1134   SODIUM mmol/L 141   < > 140   POTASSIUM mmol/L 3.8   < > 4.7   CHLORIDE mmol/L 104   < > 104   CO2 mmol/L 23   < > 23   BUN mg/dL 34*   < > 30*   CREATININE mg/dL 1.27   < > 1.18   ANION GAP mmol/L 14*   < > 13   CALCIUM mg/dL 8.5   < > 9.0   ALBUMIN g/dL  --   --  4.2   TOTAL BILIRUBIN mg/dL  --   --  0.99   ALK PHOS U/L  --   --  101   ALT U/L  --   --  20   AST U/L  --   --  33   GLUCOSE RANDOM mg/dL 81   < > 94    < > = values in this  interval not displayed.     Results from last 7 days   Lab Units 01/31/25  1134   INR  1.31*     Results from last 7 days   Lab Units 02/02/25  0750 02/01/25  2021 02/01/25  1638 02/01/25  1144 02/01/25  0742 01/31/25  2030 01/31/25  1658   POC GLUCOSE mg/dl 116 123 95 137 83 114 88               Recent Cultures (last 7 days):         Last 24 Hours Medication List:     Current Facility-Administered Medications:     apixaban (ELIQUIS) tablet 2.5 mg, BID    atorvastatin (LIPITOR) tablet 40 mg, QPM    bumetanide (BUMEX) tablet 1 mg, Daily    Empagliflozin (JARDIANCE) tablet 10 mg, QAM    hydrOXYzine HCL (ATARAX) tablet 10 mg, HS PRN    insulin lispro (HumALOG/ADMELOG) 100 units/mL subcutaneous injection 1-5 Units, TID AC **AND** Fingerstick Glucose (POCT), TID AC    levothyroxine tablet 88 mcg, Early Morning    losartan (COZAAR) tablet 25 mg, QPM    losartan (COZAAR) tablet 50 mg, Daily    melatonin tablet 3 mg, HS PRN    metoprolol tartrate (LOPRESSOR) partial tablet 12.5 mg, Q12H    pantoprazole (PROTONIX) EC tablet 20 mg, Daily    spironolactone (ALDACTONE) tablet 12.5 mg, Daily    Administrative Statements   Today, Patient Was Seen By: Waldemar Hicks MD      **Please Note: This note may have been constructed using a voice recognition system.**

## 2025-02-02 NOTE — ASSESSMENT & PLAN NOTE
Lab Results   Component Value Date    EGFR 38 02/02/2025    EGFR 40 02/01/2025    EGFR 42 01/31/2025    CREATININE 1.27 02/02/2025    CREATININE 1.23 02/01/2025    CREATININE 1.18 01/31/2025   appears to be at her baseline.   Will continue to monitor

## 2025-02-02 NOTE — ASSESSMENT & PLAN NOTE
Probably multifactorial in etiology  Patient has underlying diastolic CHF, atrial fibrillation and obstructive sleep apnea  Echocardiogram also showed moderate mitral regurgitation and tricuspid regurgitation  Telemetry monitoring  Cardiology consultation appreciated.  They plan on nuclear stress test tomorrow

## 2025-02-02 NOTE — PLAN OF CARE
Problem: PAIN - ADULT  Goal: Verbalizes/displays adequate comfort level or baseline comfort level  Description: Interventions:  - Encourage patient to monitor pain and request assistance  - Assess pain using appropriate pain scale  - Administer analgesics based on type and severity of pain and evaluate response  - Implement non-pharmacological measures as appropriate and evaluate response  - Consider cultural and social influences on pain and pain management  - Notify physician/advanced practitioner if interventions unsuccessful or patient reports new pain  Outcome: Progressing     Problem: INFECTION - ADULT  Goal: Absence or prevention of progression during hospitalization  Description: INTERVENTIONS:  - Assess and monitor for signs and symptoms of infection  - Monitor lab/diagnostic results  - Monitor all insertion sites, i.e. indwelling lines, tubes, and drains  - Monitor endotracheal if appropriate and nasal secretions for changes in amount and color  - [place] appropriate cooling/warming therapies per order  - Administer medications as ordered  - Instruct and encourage patient and family to use good hand hygiene technique  - Identify and instruct in appropriate isolation precautions for identified infection/condition  Outcome: Progressing  Goal: Absence of fever/infection during neutropenic period  Description: INTERVENTIONS:  - Monitor WBC    Outcome: Progressing     Problem: SAFETY ADULT  Goal: Patient will remain free of falls  Description: INTERVENTIONS:  - Educate patient/family on patient safety including physical limitations  - Instruct patient to call for assistance with activity   - Consult OT/PT to assist with strengthening/mobility   - Keep Call bell within reach  - Keep bed low and locked with side rails adjusted as appropriate  - Keep care items and personal belongings within reach  - Initiate and maintain comfort rounds  - Make Fall Risk Sign visible to staff  - Offer Toileting every 2 Hours,  in advance of need  - Initiate/Maintain chair/bed alarm  - Obtain necessary fall risk management equipment: walker  - Apply yellow socks and bracelet for high fall risk patients  - Consider moving patient to room near nurses station  Outcome: Progressing  Goal: Maintain or return to baseline ADL function  Description: INTERVENTIONS:  -  Assess patient's ability to carry out ADLs; assess patient's baseline for ADL function and identify physical deficits which impact ability to perform ADLs (bathing, care of mouth/teeth, toileting, grooming, dressing, etc.)  - Assess/evaluate cause of self-care deficits   - Assess range of motion  - Assess patient's mobility; develop plan if impaired  - Assess patient's need for assistive devices and provide as appropriate  - Encourage maximum independence but intervene and supervise when necessary  - Involve family in performance of ADLs  - Assess for home care needs following discharge   - Consider OT consult to assist with ADL evaluation and planning for discharge  - Provide patient education as appropriate  Outcome: Progressing  Goal: Maintains/Returns to pre admission functional level  Description: INTERVENTIONS:  - Perform AM-PAC 6 Click Basic Mobility/ Daily Activity assessment daily.  - Set and communicate daily mobility goal to care team and patient/family/caregiver.   - Collaborate with rehabilitation services on mobility goals if consulted  - Perform Range of Motion 2 times a day.  - Reposition patient every 2 hours.  - Dangle patient 2 times a day  - Stand patient 2 times a day  - Ambulate patient 2 times a day  - Out of bed to chair 2 times a day   - Out of bed for meals 2 times a day  - Out of bed for toileting  - Record patient progress and toleration of activity level   Outcome: Progressing     Problem: DISCHARGE PLANNING  Goal: Discharge to home or other facility with appropriate resources  Description: INTERVENTIONS:  - Identify barriers to discharge w/patient and  caregiver  - Arrange for needed discharge resources and transportation as appropriate  - Identify discharge learning needs (meds, wound care, etc.)  - Arrange for interpretive services to assist at discharge as needed  - Refer to Case Management Department for coordinating discharge planning if the patient needs post-hospital services based on physician/advanced practitioner order or complex needs related to functional status, cognitive ability, or social support system  Outcome: Progressing     Problem: Knowledge Deficit  Goal: Patient/family/caregiver demonstrates understanding of disease process, treatment plan, medications, and discharge instructions  Description: Complete learning assessment and assess knowledge base.  Interventions:  - Provide teaching at level of understanding  - Provide teaching via preferred learning methods  Outcome: Progressing     Problem: RESPIRATORY - ADULT  Goal: Achieves optimal ventilation and oxygenation  Description: INTERVENTIONS:  - Assess for changes in respiratory status  - Assess for changes in mentation and behavior  - Position to facilitate oxygenation and minimize respiratory effort  - Oxygen administered by appropriate delivery if ordered  - Initiate smoking cessation education as indicated  - Encourage broncho-pulmonary hygiene including cough, deep breathe, Incentive Spirometry  - Assess the need for suctioning and aspirate as needed  - Assess and instruct to report SOB or any respiratory difficulty  - Respiratory Therapy support as indicated  Outcome: Progressing

## 2025-02-02 NOTE — ASSESSMENT & PLAN NOTE
Lab Results   Component Value Date    EGFR 38 02/02/2025    EGFR 40 02/01/2025    EGFR 42 01/31/2025    CREATININE 1.27 02/02/2025    CREATININE 1.23 02/01/2025    CREATININE 1.18 01/31/2025     Monitor for now

## 2025-02-02 NOTE — ASSESSMENT & PLAN NOTE
Wt Readings from Last 3 Encounters:   02/02/25 65.8 kg (145 lb)   01/30/25 68.9 kg (152 lb)   01/06/25 65.3 kg (144 lb)     chest x-ray concerning for pneumonitis versus pulmonary edema  improvement in respiratory status after receiving Lasix 40 mg IV times one  will give extra dose of Lasix 20 mg IV times one now and reevaluate in the a.m.  consider transitioning short acting beta blocker to long-acting beta blocker  continue Cozaar 50 mg in the morning and 25 mg in the evening  continue Aldactone 12.5 mg daily  Continue Jardiance 10 mg daily  monitor I and O, daily weights and labs  will obtain nuclear stress test on Monday 2/3/2025 due to patient's complaint of shortness of breath associated with chest heaviness  low sodium diet

## 2025-02-03 ENCOUNTER — APPOINTMENT (INPATIENT)
Dept: RADIOLOGY | Facility: HOSPITAL | Age: 85
DRG: 291 | End: 2025-02-03
Payer: MEDICARE

## 2025-02-03 ENCOUNTER — APPOINTMENT (INPATIENT)
Dept: NON INVASIVE DIAGNOSTICS | Facility: HOSPITAL | Age: 85
DRG: 291 | End: 2025-02-03
Payer: MEDICARE

## 2025-02-03 LAB
ANION GAP SERPL CALCULATED.3IONS-SCNC: 14 MMOL/L (ref 4–13)
ATRIAL RATE: 80 BPM
BASOPHILS # BLD AUTO: 0.03 THOUSANDS/ΜL (ref 0–0.1)
BASOPHILS NFR BLD AUTO: 0 % (ref 0–1)
BUN SERPL-MCNC: 31 MG/DL (ref 5–25)
CALCIUM SERPL-MCNC: 8.9 MG/DL (ref 8.4–10.2)
CHEST PAIN STATEMENT: NORMAL
CHLORIDE SERPL-SCNC: 102 MMOL/L (ref 96–108)
CO2 SERPL-SCNC: 23 MMOL/L (ref 21–32)
CREAT SERPL-MCNC: 1.07 MG/DL (ref 0.6–1.3)
EOSINOPHIL # BLD AUTO: 0.12 THOUSAND/ΜL (ref 0–0.61)
EOSINOPHIL NFR BLD AUTO: 2 % (ref 0–6)
ERYTHROCYTE [DISTWIDTH] IN BLOOD BY AUTOMATED COUNT: 14.4 % (ref 11.6–15.1)
GFR SERPL CREATININE-BSD FRML MDRD: 47 ML/MIN/1.73SQ M
GLUCOSE SERPL-MCNC: 111 MG/DL (ref 65–140)
GLUCOSE SERPL-MCNC: 119 MG/DL (ref 65–140)
GLUCOSE SERPL-MCNC: 169 MG/DL (ref 65–140)
GLUCOSE SERPL-MCNC: 72 MG/DL (ref 65–140)
GLUCOSE SERPL-MCNC: 92 MG/DL (ref 65–140)
GLUCOSE SERPL-MCNC: 94 MG/DL (ref 65–140)
HCT VFR BLD AUTO: 37.9 % (ref 34.8–46.1)
HGB BLD-MCNC: 12.2 G/DL (ref 11.5–15.4)
IMM GRANULOCYTES # BLD AUTO: 0.03 THOUSAND/UL (ref 0–0.2)
IMM GRANULOCYTES NFR BLD AUTO: 0 % (ref 0–2)
LYMPHOCYTES # BLD AUTO: 0.91 THOUSANDS/ΜL (ref 0.6–4.47)
LYMPHOCYTES NFR BLD AUTO: 11 % (ref 14–44)
MAGNESIUM SERPL-MCNC: 1.9 MG/DL (ref 1.9–2.7)
MAX DIASTOLIC BP: 57 MMHG
MAX HR PERCENT: 94 %
MAX HR: 127 BPM
MAX PREDICTED HEART RATE: 135 BPM
MCH RBC QN AUTO: 28 PG (ref 26.8–34.3)
MCHC RBC AUTO-ENTMCNC: 32.2 G/DL (ref 31.4–37.4)
MCV RBC AUTO: 87 FL (ref 82–98)
MONOCYTES # BLD AUTO: 0.85 THOUSAND/ΜL (ref 0.17–1.22)
MONOCYTES NFR BLD AUTO: 11 % (ref 4–12)
NEUTROPHILS # BLD AUTO: 6.03 THOUSANDS/ΜL (ref 1.85–7.62)
NEUTS SEG NFR BLD AUTO: 76 % (ref 43–75)
NRBC BLD AUTO-RTO: 0 /100 WBCS
PLATELET # BLD AUTO: 152 THOUSANDS/UL (ref 149–390)
PMV BLD AUTO: 10.9 FL (ref 8.9–12.7)
POTASSIUM SERPL-SCNC: 3.7 MMOL/L (ref 3.5–5.3)
PROTOCOL NAME: NORMAL
QRS AXIS: 118 DEGREES
QRSD INTERVAL: 114 MS
QT INTERVAL: 382 MS
QTC INTERVAL: 497 MS
RATE PRESSURE PRODUCT: NORMAL
RBC # BLD AUTO: 4.35 MILLION/UL (ref 3.81–5.12)
REASON FOR TERMINATION: NORMAL
SL CV REST NUCLEAR ISOTOPE DOSE: 11 MCI
SL CV STRESS NUCLEAR ISOTOPE DOSE: 32.8 MCI
SL CV STRESS RECOVERY BP: NORMAL MMHG
SL CV STRESS RECOVERY HR: 113 BPM
SL CV STRESS RECOVERY O2 SAT: 97 %
SODIUM SERPL-SCNC: 139 MMOL/L (ref 135–147)
SPECT HRT GATED+EF W RNC IV: 73 %
STRESS ANGINA INDEX: 0
STRESS BASELINE BP: NORMAL MMHG
STRESS BASELINE HR: 93 BPM
STRESS O2 SAT REST: 96 %
STRESS PEAK HR: 121 BPM
STRESS POST ESTIMATED WORKLOAD: 1 METS
STRESS POST EXERCISE DUR MIN: 1 MIN
STRESS POST EXERCISE DUR MIN: 3 MIN
STRESS POST EXERCISE DUR SEC: 0 SEC
STRESS POST O2 SAT PEAK: 96 %
STRESS POST PEAK BP: 104 MMHG
STRESS POST PEAK HR: 127 BPM
STRESS POST PEAK SYSTOLIC BP: 116 MMHG
STRESS/REST PERFUSION RATIO: 0.94
T WAVE AXIS: 16 DEGREES
TARGET HR FORMULA: NORMAL
TEST INDICATION: NORMAL
VENTRICULAR RATE: 102 BPM
WBC # BLD AUTO: 7.97 THOUSAND/UL (ref 4.31–10.16)

## 2025-02-03 PROCEDURE — 78452 HT MUSCLE IMAGE SPECT MULT: CPT | Performed by: INTERNAL MEDICINE

## 2025-02-03 PROCEDURE — 94760 N-INVAS EAR/PLS OXIMETRY 1: CPT

## 2025-02-03 PROCEDURE — 93016 CV STRESS TEST SUPVJ ONLY: CPT | Performed by: INTERNAL MEDICINE

## 2025-02-03 PROCEDURE — 82948 REAGENT STRIP/BLOOD GLUCOSE: CPT

## 2025-02-03 PROCEDURE — 80048 BASIC METABOLIC PNL TOTAL CA: CPT | Performed by: HOSPITALIST

## 2025-02-03 PROCEDURE — 94660 CPAP INITIATION&MGMT: CPT

## 2025-02-03 PROCEDURE — 99232 SBSQ HOSP IP/OBS MODERATE 35: CPT | Performed by: INTERNAL MEDICINE

## 2025-02-03 PROCEDURE — 93018 CV STRESS TEST I&R ONLY: CPT | Performed by: INTERNAL MEDICINE

## 2025-02-03 PROCEDURE — 93005 ELECTROCARDIOGRAM TRACING: CPT

## 2025-02-03 PROCEDURE — 93017 CV STRESS TEST TRACING ONLY: CPT

## 2025-02-03 PROCEDURE — 85025 COMPLETE CBC W/AUTO DIFF WBC: CPT | Performed by: HOSPITALIST

## 2025-02-03 PROCEDURE — 83735 ASSAY OF MAGNESIUM: CPT | Performed by: HOSPITALIST

## 2025-02-03 PROCEDURE — 93010 ELECTROCARDIOGRAM REPORT: CPT | Performed by: INTERNAL MEDICINE

## 2025-02-03 PROCEDURE — 78452 HT MUSCLE IMAGE SPECT MULT: CPT

## 2025-02-03 PROCEDURE — A9502 TC99M TETROFOSMIN: HCPCS

## 2025-02-03 RX ORDER — ACETAMINOPHEN 325 MG/1
650 TABLET ORAL EVERY 6 HOURS PRN
Status: DISCONTINUED | OUTPATIENT
Start: 2025-02-03 | End: 2025-02-04 | Stop reason: HOSPADM

## 2025-02-03 RX ORDER — METOPROLOL TARTRATE 25 MG/1
25 TABLET, FILM COATED ORAL EVERY 12 HOURS
Status: DISCONTINUED | OUTPATIENT
Start: 2025-02-03 | End: 2025-02-03

## 2025-02-03 RX ORDER — REGADENOSON 0.08 MG/ML
0.4 INJECTION, SOLUTION INTRAVENOUS ONCE
Status: COMPLETED | OUTPATIENT
Start: 2025-02-03 | End: 2025-02-03

## 2025-02-03 RX ADMIN — Medication 12.5 MG: at 12:24

## 2025-02-03 RX ADMIN — REGADENOSON 0.4 MG: 0.08 INJECTION, SOLUTION INTRAVENOUS at 10:31

## 2025-02-03 RX ADMIN — ACETAMINOPHEN 650 MG: 325 TABLET ORAL at 07:36

## 2025-02-03 RX ADMIN — LEVOTHYROXINE SODIUM 88 MCG: 88 TABLET ORAL at 05:45

## 2025-02-03 RX ADMIN — SPIRONOLACTONE 12.5 MG: 25 TABLET ORAL at 09:14

## 2025-02-03 RX ADMIN — APIXABAN 2.5 MG: 2.5 TABLET, FILM COATED ORAL at 09:14

## 2025-02-03 RX ADMIN — EMPAGLIFLOZIN 10 MG: 10 TABLET, FILM COATED ORAL at 09:14

## 2025-02-03 RX ADMIN — LOSARTAN POTASSIUM 50 MG: 50 TABLET, FILM COATED ORAL at 09:14

## 2025-02-03 RX ADMIN — PANTOPRAZOLE SODIUM 20 MG: 20 TABLET, DELAYED RELEASE ORAL at 09:14

## 2025-02-03 RX ADMIN — Medication 12.5 MG: at 21:22

## 2025-02-03 RX ADMIN — BUMETANIDE 1 MG: 1 TABLET ORAL at 09:14

## 2025-02-03 RX ADMIN — INSULIN LISPRO 1 UNITS: 100 INJECTION, SOLUTION INTRAVENOUS; SUBCUTANEOUS at 12:25

## 2025-02-03 RX ADMIN — APIXABAN 2.5 MG: 2.5 TABLET, FILM COATED ORAL at 18:10

## 2025-02-03 RX ADMIN — ATORVASTATIN CALCIUM 40 MG: 40 TABLET, FILM COATED ORAL at 18:10

## 2025-02-03 NOTE — ASSESSMENT & PLAN NOTE
Lab Results   Component Value Date    EGFR 47 02/03/2025    EGFR 38 02/02/2025    EGFR 40 02/01/2025    CREATININE 1.07 02/03/2025    CREATININE 1.27 02/02/2025    CREATININE 1.23 02/01/2025     Creatinine stable at baseline  Avoid hypotension and nephrotoxic agents  Monitor daily BMP

## 2025-02-03 NOTE — PLAN OF CARE
Problem: PAIN - ADULT  Goal: Verbalizes/displays adequate comfort level or baseline comfort level  Description: Interventions:  - Encourage patient to monitor pain and request assistance  - Assess pain using appropriate pain scale  - Administer analgesics based on type and severity of pain and evaluate response  - Implement non-pharmacological measures as appropriate and evaluate response  - Consider cultural and social influences on pain and pain management  - Notify physician/advanced practitioner if interventions unsuccessful or patient reports new pain  Outcome: Progressing     Problem: INFECTION - ADULT  Goal: Absence or prevention of progression during hospitalization  Description: INTERVENTIONS:  - Assess and monitor for signs and symptoms of infection  - Monitor lab/diagnostic results  - Monitor all insertion sites, i.e. indwelling lines, tubes, and drains  - Monitor endotracheal if appropriate and nasal secretions for changes in amount and color  - [place] appropriate cooling/warming therapies per order  - Administer medications as ordered  - Instruct and encourage patient and family to use good hand hygiene technique  - Identify and instruct in appropriate isolation precautions for identified infection/condition  Outcome: Progressing  Goal: Absence of fever/infection during neutropenic period  Description: INTERVENTIONS:  - Monitor WBC    Outcome: Progressing     Problem: SAFETY ADULT  Goal: Patient will remain free of falls  Description: INTERVENTIONS:  - Educate patient/family on patient safety including physical limitations  - Instruct patient to call for assistance with activity   - Consult OT/PT to assist with strengthening/mobility   - Keep Call bell within reach  - Keep bed low and locked with side rails adjusted as appropriate  - Keep care items and personal belongings within reach  - Initiate and maintain comfort rounds  - Make Fall Risk Sign visible to staff  - Offer Toileting every 2 Hours,  in advance of need  - Initiate/Maintain chair/bed alarm  - Obtain necessary fall risk management equipment: walker  - Apply yellow socks and bracelet for high fall risk patients  - Consider moving patient to room near nurses station  Outcome: Progressing  Goal: Maintain or return to baseline ADL function  Description: INTERVENTIONS:  -  Assess patient's ability to carry out ADLs; assess patient's baseline for ADL function and identify physical deficits which impact ability to perform ADLs (bathing, care of mouth/teeth, toileting, grooming, dressing, etc.)  - Assess/evaluate cause of self-care deficits   - Assess range of motion  - Assess patient's mobility; develop plan if impaired  - Assess patient's need for assistive devices and provide as appropriate  - Encourage maximum independence but intervene and supervise when necessary  - Involve family in performance of ADLs  - Assess for home care needs following discharge   - Consider OT consult to assist with ADL evaluation and planning for discharge  - Provide patient education as appropriate  Outcome: Progressing  Goal: Maintains/Returns to pre admission functional level  Description: INTERVENTIONS:  - Perform AM-PAC 6 Click Basic Mobility/ Daily Activity assessment daily.  - Set and communicate daily mobility goal to care team and patient/family/caregiver.   - Collaborate with rehabilitation services on mobility goals if consulted  - Perform Range of Motion 2 times a day.  - Reposition patient every 2 hours.  - Dangle patient 2 times a day  - Stand patient 2 times a day  - Ambulate patient 2 times a day  - Out of bed to chair 2 times a day   - Out of bed for meals 2 times a day  - Out of bed for toileting  - Record patient progress and toleration of activity level   Outcome: Progressing     Problem: DISCHARGE PLANNING  Goal: Discharge to home or other facility with appropriate resources  Description: INTERVENTIONS:  - Identify barriers to discharge w/patient and  caregiver  - Arrange for needed discharge resources and transportation as appropriate  - Identify discharge learning needs (meds, wound care, etc.)  - Arrange for interpretive services to assist at discharge as needed  - Refer to Case Management Department for coordinating discharge planning if the patient needs post-hospital services based on physician/advanced practitioner order or complex needs related to functional status, cognitive ability, or social support system  Outcome: Progressing     Problem: Knowledge Deficit  Goal: Patient/family/caregiver demonstrates understanding of disease process, treatment plan, medications, and discharge instructions  Description: Complete learning assessment and assess knowledge base.  Interventions:  - Provide teaching at level of understanding  - Provide teaching via preferred learning methods  Outcome: Progressing     Problem: RESPIRATORY - ADULT  Goal: Achieves optimal ventilation and oxygenation  Description: INTERVENTIONS:  - Assess for changes in respiratory status  - Assess for changes in mentation and behavior  - Position to facilitate oxygenation and minimize respiratory effort  - Oxygen administered by appropriate delivery if ordered  - Initiate smoking cessation education as indicated  - Encourage broncho-pulmonary hygiene including cough, deep breathe, Incentive Spirometry  - Assess the need for suctioning and aspirate as needed  - Assess and instruct to report SOB or any respiratory difficulty  - Respiratory Therapy support as indicated  Outcome: Progressing

## 2025-02-03 NOTE — ASSESSMENT & PLAN NOTE
Lab Results   Component Value Date    EGFR 47 02/03/2025    EGFR 38 02/02/2025    EGFR 40 02/01/2025    CREATININE 1.07 02/03/2025    CREATININE 1.27 02/02/2025    CREATININE 1.23 02/01/2025   appears to be at her baseline.   Will continue to monitor

## 2025-02-03 NOTE — ASSESSMENT & PLAN NOTE
Wt Readings from Last 3 Encounters:   02/03/25 64.9 kg (143 lb)   01/30/25 68.9 kg (152 lb)   01/06/25 65.3 kg (144 lb)       CXR with pneumonitis vs pulmonary edema  Received IV lasix 40mg x 1 dose with improvement in breathing   Continue Cozaar 50mg QD, Aldactone 12.5mg QD, Jardiance 10mg QD  Transitioned to PTA oral Bumex 1mg QD  NM stress test (2/3/25) negative for acute ischemia  Cardiac, low sodium diet

## 2025-02-03 NOTE — PROGRESS NOTES
Progress Note - Cardiology   Name: Rosalind Wise 84 y.o. female I MRN: [id number]  Unit/Bed#: [address] [place] [address] I Date of Admission: 1/31/2025   Date of Service: 2/3/2025 I Hospital Day: 2    Assessment & Plan  Acute on chronic diastolic congestive heart failure (HCC)  Wt Readings from Last 3 Encounters:   02/03/25 64.9 kg (143 lb)   01/30/25 68.9 kg (152 lb)   01/06/25 65.3 kg (144 lb)     chest x-ray concerning for pneumonitis versus pulmonary edema  improvement in respiratory status after receiving Lasix 40 mg IV times one  consider transitioning short acting beta blocker to long-acting beta blocker  2/3/2025 blood pressures have been low since starting SGL T2 inhibitor, will discontinue evening dose of Cozaar 25 mg and continue Cozaar 50 mg once a day with parameters.  continue Aldactone 12.5 mg daily  Continue Jardiance 10 mg daily  monitor I and O, daily weights and labs  will obtain nuclear stress test on Monday 2/3/2025 due to patient's complaint of shortness of breath associated with chest heaviness  low sodium diet  Benign essential hypertension  blood pressures initially elevated on presentation but have improved with diuresis  2/3/2025: blood pressure slightly low since starting SGL T2 inhibitor, will discontinue evening dose of Cozaar 25 mg and continue Cozaar 50 mg once a day  continue Lopressor 12.5 mg Q 12 hours   continue Aldactone 12.5 mg daily  Persistent atrial fibrillation (HCC)  heart rates controlled  continue Lopressor 12.5 mg Q 12 hours  Continue Eliquis 5 mg bid  CKD (chronic kidney disease), stage III (HCC)  Lab Results   Component Value Date    EGFR 47 02/03/2025    EGFR 38 02/02/2025    EGFR 40 02/01/2025    CREATININE 1.07 02/03/2025    CREATININE 1.27 02/02/2025    CREATININE 1.23 02/01/2025   appears to be at her baseline.   Will continue to monitor  Hypothyroidism  continue levothyroxine 88 µg daily  WENDI (obstructive sleep apnea)  continue CPAP use    Subjective   Chief  Complaint: no complaints offered. For nuclear stress test today.      Objective :  Temp:  [97.4 °F (36.3 °C)-98.2 °F (36.8 °C)] 97.8 °F (36.6 °C)  HR:  [] 121  BP: ()/(52-66) 118/54  Resp:  [17] 17  SpO2:  [93 %-98 %] 97 %  O2 Device: CPAP  Orthostatic Blood Pressures      Flowsheet Row Most Recent Value   Blood Pressure 118/54 filed at 02/03/2025 0855   Patient Position - Orthostatic VS Lying filed at 02/02/2025 2246          First Weight: Weight - Scale: 70.5 kg (155 lb 6.8 oz) (01/31/25 1100)  Vitals:    02/02/25 0600 02/03/25 0555   Weight: 65.8 kg (145 lb) 64.9 kg (143 lb)     Physical Exam  Vitals and nursing note reviewed.   Constitutional:       General: She is not in acute distress.     Appearance: She is well-developed and normal weight.   HENT:      Right Ear: External ear normal.      Left Ear: External ear normal.   Eyes:      General:         Right eye: No discharge.         Left eye: No discharge.   Cardiovascular:      Rate and Rhythm: Regular rhythm. Tachycardia present.      Pulses: Normal pulses.      Heart sounds: Murmur heard.   Pulmonary:      Effort: Pulmonary effort is normal. No respiratory distress.      Breath sounds: Normal breath sounds.   Abdominal:      General: Bowel sounds are normal. There is no distension.      Palpations: Abdomen is soft.   Musculoskeletal:      Right lower leg: No edema.   Skin:     General: Skin is warm and dry.      Capillary Refill: Capillary refill takes less than 2 seconds.   Neurological:      Mental Status: She is alert and oriented to person, place, and time. Mental status is at baseline.   Psychiatric:         Mood and Affect: Mood normal.           Lab Results: I have reviewed the following results:  Results from last 7 days   Lab Units 02/03/25  0545 02/02/25  0623 01/31/25  1134   WBC Thousand/uL 7.97 6.31 8.10   HEMOGLOBIN g/dL 12.2 12.2 13.3   HEMATOCRIT % 37.9 37.3 41.8   PLATELETS Thousands/uL 152 148* 138*     Results from last 7 days    Lab Units 02/03/25  0545 02/02/25  0623 02/01/25  0431   POTASSIUM mmol/L 3.7 3.8 3.8   CHLORIDE mmol/L 102 104 105   CO2 mmol/L 23 23 20*   BUN mg/dL 31* 34* 30*   CREATININE mg/dL 1.07 1.27 1.23   CALCIUM mg/dL 8.9 8.5 8.6     Results from last 7 days   Lab Units 01/31/25  1134   INR  1.31*   PTT seconds 34     Lab Results   Component Value Date    HGBA1C 5.8 (H) 01/03/2025     Lab Results   Component Value Date    TROPONINI 0.07 (H) 03/25/2021       VTE Pharmacologic Prophylaxis: VTE covered by:  apixaban, Oral, 2.5 mg at 02/03/25 0914     VTE Mechanical Prophylaxis: sequential compression device    Em HAGEN  Cardiology

## 2025-02-03 NOTE — ASSESSMENT & PLAN NOTE
Probably multifactorial in etiology. Patient has underlying diastolic CHF, atrial fibrillation and obstructive sleep apnea  Echocardiogram also showed moderate mitral regurgitation and tricuspid regurgitation  NM stress test negative for acute ischemia  Currently oxygenating well on RA

## 2025-02-03 NOTE — PROGRESS NOTES
Progress Note - Hospitalist   Name: Rosalind Wise 84 y.o. female I MRN: [id number]  Unit/Bed#: [address] [place] [address] Date of Admission: 1/31/2025   Date of Service: 2/3/2025 I Hospital Day: 2    Assessment & Plan  SOB (shortness of breath) on exertion  Probably multifactorial in etiology. Patient has underlying diastolic CHF, atrial fibrillation and obstructive sleep apnea  Echocardiogram also showed moderate mitral regurgitation and tricuspid regurgitation  NM stress test negative for acute ischemia  Currently oxygenating well on RA  Acute on chronic diastolic congestive heart failure (HCC)  Wt Readings from Last 3 Encounters:   02/03/25 64.9 kg (143 lb)   01/30/25 68.9 kg (152 lb)   01/06/25 65.3 kg (144 lb)       CXR with pneumonitis vs pulmonary edema  Received IV lasix 40mg x 1 dose with improvement in breathing   Continue Cozaar 50mg QD, Aldactone 12.5mg QD, Jardiance 10mg QD  Transitioned to PTA oral Bumex 1mg QD  NM stress test (2/3/25) negative for acute ischemia  Cardiac, low sodium diet  Benign essential hypertension  Continue losartan, metoprolol, spironolactone and Bumex  Monitor vitals per routine  Hypothyroidism  Continue levothyroxine  CKD (chronic kidney disease), stage III ([facility])  Lab Results   Component Value Date    EGFR 47 02/03/2025    EGFR 38 02/02/2025    EGFR 40 02/01/2025    CREATININE 1.07 02/03/2025    CREATININE 1.27 02/02/2025    CREATININE 1.23 02/01/2025     Creatinine stable at baseline  Avoid hypotension and nephrotoxic agents  Monitor daily BMP  Hyperlipemia  Continue statin  WENDI (obstructive sleep apnea)  CPAP HS  Gastroesophageal reflux disease without esophagitis  Continue PPI  Status post right frontotemporal replacement cranioplasty  History noted  Persistent atrial fibrillation (HCC)  Continue metoprolol for rate control  Continue Eliquis for anticoagulation    VTE Pharmacologic Prophylaxis:   Moderate Risk (Score 3-4) - Pharmacological DVT Prophylaxis Ordered: apixaban  (Eliquis).    Mobility:   Basic Mobility Inpatient Raw Score: 20  JH-HLM Goal: 6: Walk 10 steps or more  JH-HLM Achieved: 7: Walk 25 feet or more  JH-HLM Goal achieved. Continue to encourage appropriate mobility.    Patient Centered Rounds: I performed bedside rounds with nursing staff today.   Discussions with Specialists or Other Care Team Provider: Nursing, Cardiology    Education and Discussions with Family / Patient: Updated  (daughter in law) at bedside.    Current Length of Stay: 2 day(s)  Current Patient Status: Inpatient   Certification Statement: The patient will continue to require additional inpatient hospital stay due to SOB  Discharge Plan: Anticipate discharge tomorrow to home.    Code Status: Prior    Subjective   Patient lying in bed. Breathing improved. Anticipate discharge home tomorrow.     Objective :  Temp:  [97 °F (36.1 °C)-98.2 °F (36.8 °C)] 97 °F (36.1 °C)  HR:  [] 84  BP: ()/(52-66) 108/57  Resp:  [17-20] 17  SpO2:  [93 %-98 %] 98 %  O2 Device: CPAP    Body mass index is 27.93 kg/m².     Input and Output Summary (last 24 hours):     Intake/Output Summary (Last 24 hours) at 2/3/2025 1540  Last data filed at 2/2/2025 2300  Gross per 24 hour   Intake 240 ml   Output 600 ml   Net -360 ml       Physical Exam  Vitals and nursing note reviewed.   Constitutional:       General: She is not in acute distress.     Appearance: She is well-developed.   HENT:      Head: Normocephalic and atraumatic.   Eyes:      Conjunctiva/sclera: Conjunctivae normal.   Cardiovascular:      Rate and Rhythm: Normal rate. Rhythm irregular.      Heart sounds: No murmur heard.  Pulmonary:      Effort: Pulmonary effort is normal. No respiratory distress.      Breath sounds: Normal breath sounds.   Abdominal:      Palpations: Abdomen is soft.      Tenderness: There is no abdominal tenderness.   Musculoskeletal:         General: No swelling.      Cervical back: Neck supple.      Right lower leg: No  edema.      Left lower leg: No edema.   Skin:     General: Skin is warm and dry.      Capillary Refill: Capillary refill takes less than 2 seconds.   Neurological:      Mental Status: She is alert.   Psychiatric:         Mood and Affect: Mood normal.           Lines/Drains:        Telemetry:  Telemetry Orders (From admission, onward)               24 Hour Telemetry Monitoring  Continuous x 24 Hours (Telem)        Expiring   Question:  Reason for 24 Hour Telemetry  Answer:  Arrhythmias requiring acute medical intervention / PPM or ICD malfunction                     Telemetry Reviewed: Atrial fibrillation. HR averaging   Indication for Continued Telemetry Use: Arrthymias requiring medical therapy               Lab Results: I have reviewed the following results:   Results from last 7 days   Lab Units 02/03/25  0545   WBC Thousand/uL 7.97   HEMOGLOBIN g/dL 12.2   HEMATOCRIT % 37.9   PLATELETS Thousands/uL 152   SEGS PCT % 76*   LYMPHO PCT % 11*   MONO PCT % 11   EOS PCT % 2     Results from last 7 days   Lab Units 02/03/25  0545 02/01/25  0431 01/31/25  1134   SODIUM mmol/L 139   < > 140   POTASSIUM mmol/L 3.7   < > 4.7   CHLORIDE mmol/L 102   < > 104   CO2 mmol/L 23   < > 23   BUN mg/dL 31*   < > 30*   CREATININE mg/dL 1.07   < > 1.18   ANION GAP mmol/L 14*   < > 13   CALCIUM mg/dL 8.9   < > 9.0   ALBUMIN g/dL  --   --  4.2   TOTAL BILIRUBIN mg/dL  --   --  0.99   ALK PHOS U/L  --   --  101   ALT U/L  --   --  20   AST U/L  --   --  33   GLUCOSE RANDOM mg/dL 92   < > 94    < > = values in this interval not displayed.     Results from last 7 days   Lab Units 01/31/25  1134   INR  1.31*     Results from last 7 days   Lab Units 02/03/25  1143 02/03/25  0734 02/02/25  2146 02/02/25  1640 02/02/25  1220 02/02/25  0750 02/01/25  2021 02/01/25  1638 02/01/25  1144 02/01/25  0742 01/31/25  2030 01/31/25  1658   POC GLUCOSE mg/dl 169* 111 113 104 137 116 123 95 137 83 114 88               Recent Cultures (last 7 days):          Imaging Results Review: I reviewed radiology reports from this admission including: NM stress test and Echocardiogram.  Other Study Results Review: EKG was reviewed.     Last 24 Hours Medication List:     Current Facility-Administered Medications:     acetaminophen (TYLENOL) tablet 650 mg, Q6H PRN    apixaban (ELIQUIS) tablet 2.5 mg, BID    atorvastatin (LIPITOR) tablet 40 mg, QPM    bumetanide (BUMEX) tablet 1 mg, Daily    Empagliflozin (JARDIANCE) tablet 10 mg, QAM    hydrOXYzine HCL (ATARAX) tablet 10 mg, HS PRN    insulin lispro (HumALOG/ADMELOG) 100 units/mL subcutaneous injection 1-5 Units, TID AC **AND** Fingerstick Glucose (POCT), TID AC    levothyroxine tablet 88 mcg, Early Morning    losartan (COZAAR) tablet 50 mg, Daily    melatonin tablet 3 mg, HS PRN    metoprolol tartrate (LOPRESSOR) partial tablet 12.5 mg, Q12H    pantoprazole (PROTONIX) EC tablet 20 mg, Daily    spironolactone (ALDACTONE) tablet 12.5 mg, Daily    Administrative Statements   Today, Patient Was Seen By: Josie Fisher PA-C  I have spent a total time of 30 minutes in caring for this patient on the day of the visit/encounter including Diagnostic results, Risks and benefits of tx options, Instructions for management, Patient and family education, Counseling / Coordination of care, Documenting in the medical record, Reviewing / ordering tests, medicine, procedures  , Obtaining or reviewing history  , and Communicating with other healthcare professionals .    **Please Note: This note may have been constructed using a voice recognition system.**

## 2025-02-03 NOTE — ASSESSMENT & PLAN NOTE
blood pressures initially elevated on presentation but have improved with diuresis  2/3/2025: blood pressure slightly low since starting SGL T2 inhibitor, will discontinue evening dose of Cozaar 25 mg and continue Cozaar 50 mg once a day  continue Lopressor 12.5 mg Q 12 hours   continue Aldactone 12.5 mg daily

## 2025-02-03 NOTE — ASSESSMENT & PLAN NOTE
Wt Readings from Last 3 Encounters:   02/03/25 64.9 kg (143 lb)   01/30/25 68.9 kg (152 lb)   01/06/25 65.3 kg (144 lb)     chest x-ray concerning for pneumonitis versus pulmonary edema  improvement in respiratory status after receiving Lasix 40 mg IV times one  consider transitioning short acting beta blocker to long-acting beta blocker  2/3/2025 blood pressures have been low since starting SGL T2 inhibitor, will discontinue evening dose of Cozaar 25 mg and continue Cozaar 50 mg once a day with parameters.  continue Aldactone 12.5 mg daily  Continue Jardiance 10 mg daily  monitor I and O, daily weights and labs  will obtain nuclear stress test on Monday 2/3/2025 due to patient's complaint of shortness of breath associated with chest heaviness  low sodium diet

## 2025-02-04 VITALS
RESPIRATION RATE: 17 BRPM | DIASTOLIC BLOOD PRESSURE: 107 MMHG | BODY MASS INDEX: 26.9 KG/M2 | SYSTOLIC BLOOD PRESSURE: 129 MMHG | OXYGEN SATURATION: 96 % | HEIGHT: 60 IN | WEIGHT: 137 LBS | TEMPERATURE: 97.6 F | HEART RATE: 89 BPM

## 2025-02-04 LAB
ANION GAP SERPL CALCULATED.3IONS-SCNC: 13 MMOL/L (ref 4–13)
ATRIAL RATE: 375 BPM
BUN SERPL-MCNC: 28 MG/DL (ref 5–25)
CALCIUM SERPL-MCNC: 9.2 MG/DL (ref 8.4–10.2)
CHLORIDE SERPL-SCNC: 103 MMOL/L (ref 96–108)
CO2 SERPL-SCNC: 25 MMOL/L (ref 21–32)
CREAT SERPL-MCNC: 1.18 MG/DL (ref 0.6–1.3)
GFR SERPL CREATININE-BSD FRML MDRD: 42 ML/MIN/1.73SQ M
GLUCOSE SERPL-MCNC: 103 MG/DL (ref 65–140)
GLUCOSE SERPL-MCNC: 123 MG/DL (ref 65–140)
GLUCOSE SERPL-MCNC: 97 MG/DL (ref 65–140)
POTASSIUM SERPL-SCNC: 3.9 MMOL/L (ref 3.5–5.3)
QRS AXIS: 103 DEGREES
QRS AXIS: 106 DEGREES
QRSD INTERVAL: 118 MS
QRSD INTERVAL: 124 MS
QT INTERVAL: 350 MS
QT INTERVAL: 400 MS
QTC INTERVAL: 461 MS
QTC INTERVAL: 502 MS
SODIUM SERPL-SCNC: 141 MMOL/L (ref 135–147)
T WAVE AXIS: -17 DEGREES
T WAVE AXIS: -43 DEGREES
VENTRICULAR RATE: 104 BPM
VENTRICULAR RATE: 95 BPM

## 2025-02-04 PROCEDURE — 99232 SBSQ HOSP IP/OBS MODERATE 35: CPT | Performed by: INTERNAL MEDICINE

## 2025-02-04 PROCEDURE — 93010 ELECTROCARDIOGRAM REPORT: CPT | Performed by: INTERNAL MEDICINE

## 2025-02-04 PROCEDURE — 99239 HOSP IP/OBS DSCHRG MGMT >30: CPT | Performed by: INTERNAL MEDICINE

## 2025-02-04 PROCEDURE — 82948 REAGENT STRIP/BLOOD GLUCOSE: CPT

## 2025-02-04 PROCEDURE — 80048 BASIC METABOLIC PNL TOTAL CA: CPT | Performed by: INTERNAL MEDICINE

## 2025-02-04 RX ORDER — LOSARTAN POTASSIUM 50 MG/1
50 TABLET ORAL DAILY
Start: 2025-02-04 | End: 2025-02-06 | Stop reason: SDUPTHER

## 2025-02-04 RX ADMIN — Medication 12.5 MG: at 10:55

## 2025-02-04 RX ADMIN — EMPAGLIFLOZIN 10 MG: 10 TABLET, FILM COATED ORAL at 10:56

## 2025-02-04 RX ADMIN — LEVOTHYROXINE SODIUM 88 MCG: 88 TABLET ORAL at 05:34

## 2025-02-04 RX ADMIN — SPIRONOLACTONE 12.5 MG: 25 TABLET ORAL at 10:56

## 2025-02-04 RX ADMIN — BUMETANIDE 1 MG: 1 TABLET ORAL at 10:55

## 2025-02-04 RX ADMIN — PANTOPRAZOLE SODIUM 20 MG: 20 TABLET, DELAYED RELEASE ORAL at 11:01

## 2025-02-04 RX ADMIN — LOSARTAN POTASSIUM 50 MG: 50 TABLET, FILM COATED ORAL at 10:56

## 2025-02-04 RX ADMIN — ACETAMINOPHEN 650 MG: 325 TABLET ORAL at 05:37

## 2025-02-04 RX ADMIN — APIXABAN 2.5 MG: 2.5 TABLET, FILM COATED ORAL at 10:56

## 2025-02-04 NOTE — DISCHARGE SUMMARY
Discharge Summary - Hospitalist   Name: Rosalind Wise 84 y.o. female I MRN: [id number]  Unit/Bed#: [address] [place] [address] Date of Admission: 1/31/2025   Date of Service: 2/4/2025 I Hospital Day: 3     Assessment & Plan  Acute on chronic diastolic congestive heart failure (HCC)  Wt Readings from Last 3 Encounters:   02/04/25 62.1 kg (137 lb)   01/30/25 68.9 kg (152 lb)   01/06/25 65.3 kg (144 lb)       CXR with pneumonitis vs pulmonary edema  Received IV lasix 40mg x 1 dose with improvement in breathing   Continue Cozaar 50mg QD, Aldactone 12.5mg QD, Jardiance 10mg QD  Transitioned to PTA oral Bumex 1mg QD  NM stress test (2/3/25) negative for acute ischemia  Cardiac, low sodium diet  SOB (shortness of breath) on exertion  Probably multifactorial in etiology. Patient has underlying diastolic CHF, atrial fibrillation and obstructive sleep apnea  Echocardiogram also showed moderate mitral regurgitation and tricuspid regurgitation  NM stress test negative for acute ischemia  Currently oxygenating well on RA  Benign essential hypertension  Continue losartan, metoprolol, spironolactone and Bumex  Monitor vitals per routine  Hypothyroidism  Continue levothyroxine  CKD (chronic kidney disease), stage III (HCC)  Lab Results   Component Value Date    EGFR 42 02/04/2025    EGFR 47 02/03/2025    EGFR 38 02/02/2025    CREATININE 1.18 02/04/2025    CREATININE 1.07 02/03/2025    CREATININE 1.27 02/02/2025     Creatinine stable at baseline  Avoid hypotension and nephrotoxic agents  Monitor daily BMP  Hyperlipemia  Continue statin  WENDI (obstructive sleep apnea)  CPAP HS  Gastroesophageal reflux disease without esophagitis  Continue PPI  Status post right frontotemporal replacement cranioplasty  History noted  Persistent atrial fibrillation (HCC)  Continue metoprolol for rate control  Continue Eliquis for anticoagulation     Medical Problems       Resolved Problems  Date Reviewed: 1/6/2025   None       Discharging Physician /  Practitioner: Josie Fisher PA-C  PCP: Suzanna Lopez MD  Admission Date:   Admission Orders (From admission, onward)       Ordered        02/01/25 1422  INPATIENT ADMISSION  Once            01/31/25 1439  Place in Observation  Once                          Discharge Date: 02/04/25    Consultations During Hospital Stay:  Cardiology    Procedures Performed:   None    Significant Findings / Test Results:   CXR (1/31/25): Bibasilar hazy and interstitial opacity. In the acute setting, diagnostic considerations include pneumonitis and cardiogenic/noncardiogenic edema.   NM stress (2/3/25): Negative study for major reversible scintigraphic changes with pharmacological vasodilation to suggest ischemia. Normal ejection fraction by gated EF.     Incidental Findings:   None     Test Results Pending at Discharge (will require follow up):   None     Outpatient Tests Requested:  None    Complications:  None    Reason for Admission: Acute on chronic heart failure    Hospital Course:   Rosalind Wise is a 84 y.o. female patient who originally presented to the hospital on 1/31/2025 due to short of breath with exertion.  Chest x-ray with evidence of pulmonary edema.  Patient admitted for acute on chronic CHF exacerbation.  Cardiology has been consulted.  Patient also complaining of left-sided chest pain that radiates down the arm.  Patient was treated with IV Lasix medication with improvement in breathing.  Patient underwent nuclear medicine stress test which was negative for acute ischemia.  Patient's cardiac and respiratory symptoms resolved.  See cardiac medication adjustments as above per our cardiology service.  Patient under no acute distress on day of discharge.  Patient has close follow-up with cardiology in the office after discharge.  All patient and family questions answered to the best of my ability.      Please see above list of diagnoses and related plan for additional information.     Condition at Discharge:  stable    Discharge Day Visit / Exam:   Subjective:  Patient sitting in bed under no acute distress. Denies any cardiac complaints. Eager for discharge home today.    Vitals: Blood Pressure: (!) 129/107 (02/04/25 0819)  Pulse: 89 (02/04/25 0819)  Temperature: 97.6 °F (36.4 °C) (02/04/25 0819)  Temp Source: Axillary (02/03/25 2227)  Respirations: 17 (02/04/25 0819)  Height: 5' (152.4 cm) (01/31/25 1100)  Weight - Scale: 62.1 kg (137 lb) (02/04/25 0600)  SpO2: 96 % (02/04/25 0819)  Physical Exam  Vitals and nursing note reviewed.   Constitutional:       General: She is not in acute distress.     Appearance: She is well-developed.   HENT:      Head: Normocephalic and atraumatic.   Eyes:      Conjunctiva/sclera: Conjunctivae normal.   Cardiovascular:      Rate and Rhythm: Normal rate.      Heart sounds: No murmur heard.  Pulmonary:      Effort: Pulmonary effort is normal. No respiratory distress.      Breath sounds: Normal breath sounds.   Musculoskeletal:         General: No swelling.      Cervical back: Neck supple.      Right lower leg: No edema.      Left lower leg: No edema.   Skin:     General: Skin is warm and dry.   Neurological:      Mental Status: She is alert.   Psychiatric:         Mood and Affect: Mood normal.          Discussion with Family: Updated  (daughter in law) at bedside.    Discharge instructions/Information to patient and family:   See after visit summary for information provided to patient and family.      Provisions for Follow-Up Care:  See after visit summary for information related to follow-up care and any pertinent home health orders.      Mobility at time of Discharge:   Basic Mobility Inpatient Raw Score: 20  JH-HLM Goal: 6: Walk 10 steps or more  JH-HLM Achieved: 6: Walk 10 steps or more  HLM Goal achieved. Continue to encourage appropriate mobility.     Disposition:   Home    Planned Readmission: None    Discharge Medications:  See after visit summary for reconciled  discharge medications provided to patient and/or family.      Administrative Statements   Discharge Statement:  I have spent a total time of 50 minutes in caring for this patient on the day of the visit/encounter. >30 minutes of time was spent on: Diagnostic results, Risks and benefits of tx options, Instructions for management, Patient and family education, Importance of tx compliance, Counseling / Coordination of care, Documenting in the medical record, Reviewing / ordering tests, medicine, procedures  , and Communicating with other healthcare professionals .    **Please Note: This note may have been constructed using a voice recognition system**

## 2025-02-04 NOTE — PROGRESS NOTES
Progress Note - Cardiology   Name: Rosalind Wise 84 y.o. female I MRN: [id number]  Unit/Bed#: [address] [place] [address] I Date of Admission: 1/31/2025   Date of Service: 2/4/2025 I Hospital Day: 3    Assessment & Plan  Acute on chronic diastolic congestive heart failure (HCC)  Wt Readings from Last 3 Encounters:   02/04/25 62.1 kg (137 lb)   01/30/25 68.9 kg (152 lb)   01/06/25 65.3 kg (144 lb)     chest x-ray concerning for pneumonitis versus pulmonary edema  improvement in respiratory status after receiving Lasix 40 mg IV times one  continue Lopressor 12.5 mg Q 12 hours  2/3/2025 blood pressures have been low since starting SGL T2 inhibitor, will discontinue evening dose of Cozaar 25 mg and continue Cozaar 50 mg once a day with parameters.  continue Aldactone 12.5 mg daily  Continue Jardiance 10 mg daily  monitor I and O, daily weights and labs  2/3/2025 Lexiscan nuclear stress test: group study for major reversible ischemia.  No transient ischemic dilatation  low sodium diet  Benign essential hypertension  blood pressures initially elevated on presentation but have improved with diuresis  2/3/2025: blood pressure slightly low since starting SGL T2 inhibitor, will discontinue evening dose of Cozaar 25 mg and continue Cozaar 50 mg once a day  continue Lopressor 12.5 mg Q 12 hours   continue Aldactone 12.5 mg daily  Persistent atrial fibrillation (HCC)  heart rates controlled  continue Lopressor 12.5 mg Q 12 hours  Continue Eliquis 5 mg bid  CKD (chronic kidney disease), stage III (HCC)  Lab Results   Component Value Date    EGFR 42 02/04/2025    EGFR 47 02/03/2025    EGFR 38 02/02/2025    CREATININE 1.18 02/04/2025    CREATININE 1.07 02/03/2025    CREATININE 1.27 02/02/2025   appears to be at her baseline.   Will continue to monitor  Hypothyroidism  continue levothyroxine 88 µg daily  WENDI (obstructive sleep apnea)  continue CPAP use    Subjective   Chief Complaint: Patient seen and examined. She states her breathing  feels back to baseline and blood pressures are stable. Nuclear stress test yesterday did not demonstrate any ischemia.      Objective :  Temp:  [97 °F (36.1 °C)-98.4 °F (36.9 °C)] 97.6 °F (36.4 °C)  HR:  [] 89  BP: (101-129)/() 129/107  Resp:  [17-20] 17  SpO2:  [96 %-98 %] 96 %  O2 Device: CPAP  Orthostatic Blood Pressures      Flowsheet Row Most Recent Value   Blood Pressure 129/107 filed at 02/04/2025 0819   Patient Position - Orthostatic VS Lying filed at 02/03/2025 2227          First Weight: Weight - Scale: 70.5 kg (155 lb 6.8 oz) (01/31/25 1100)  Vitals:    02/03/25 0555 02/04/25 0600   Weight: 64.9 kg (143 lb) 62.1 kg (137 lb)     Physical Exam  Vitals and nursing note reviewed.   Constitutional:       General: She is not in acute distress.     Appearance: She is well-developed and normal weight.   HENT:      Right Ear: External ear normal.      Left Ear: External ear normal.   Eyes:      General:         Right eye: No discharge.         Left eye: No discharge.   Cardiovascular:      Rate and Rhythm: Normal rate. Rhythm irregular.      Pulses: Normal pulses.      Heart sounds: Murmur heard.   Pulmonary:      Effort: Pulmonary effort is normal. No respiratory distress.      Breath sounds: Normal breath sounds.   Abdominal:      General: Bowel sounds are normal. There is no distension.      Palpations: Abdomen is soft.   Musculoskeletal:      Right lower leg: No edema.      Left lower leg: No edema.   Skin:     General: Skin is warm and dry.      Capillary Refill: Capillary refill takes less than 2 seconds.   Neurological:      Mental Status: She is alert and oriented to person, place, and time. Mental status is at baseline.   Psychiatric:         Mood and Affect: Mood normal.           Lab Results: I have reviewed the following results:  Results from last 7 days   Lab Units 02/03/25  0545 02/02/25  0623 01/31/25  1134   WBC Thousand/uL 7.97 6.31 8.10   HEMOGLOBIN g/dL 12.2 12.2 13.3   HEMATOCRIT %  37.9 37.3 41.8   PLATELETS Thousands/uL 152 148* 138*     Results from last 7 days   Lab Units 02/04/25  0544 02/03/25  0545 02/02/25  0623   POTASSIUM mmol/L 3.9 3.7 3.8   CHLORIDE mmol/L 103 102 104   CO2 mmol/L 25 23 23   BUN mg/dL 28* 31* 34*   CREATININE mg/dL 1.18 1.07 1.27   CALCIUM mg/dL 9.2 8.9 8.5     Results from last 7 days   Lab Units 01/31/25  1134   INR  1.31*   PTT seconds 34     Lab Results   Component Value Date    HGBA1C 5.8 (H) 01/03/2025     Lab Results   Component Value Date    TROPONINI 0.07 (H) 03/25/2021         VTE Pharmacologic Prophylaxis: VTE covered by:  apixaban, Oral, 2.5 mg at 02/03/25 1810       Em HAGEN  Cardiology

## 2025-02-04 NOTE — ASSESSMENT & PLAN NOTE
Lab Results   Component Value Date    EGFR 42 02/04/2025    EGFR 47 02/03/2025    EGFR 38 02/02/2025    CREATININE 1.18 02/04/2025    CREATININE 1.07 02/03/2025    CREATININE 1.27 02/02/2025   appears to be at her baseline.   Will continue to monitor

## 2025-02-04 NOTE — PLAN OF CARE
Problem: PAIN - ADULT  Goal: Verbalizes/displays adequate comfort level or baseline comfort level  Description: Interventions:  - Encourage patient to monitor pain and request assistance  - Assess pain using appropriate pain scale  - Administer analgesics based on type and severity of pain and evaluate response  - Implement non-pharmacological measures as appropriate and evaluate response  - Consider cultural and social influences on pain and pain management  - Notify physician/advanced practitioner if interventions unsuccessful or patient reports new pain  Outcome: Progressing     Problem: INFECTION - ADULT  Goal: Absence or prevention of progression during hospitalization  Description: INTERVENTIONS:  - Assess and monitor for signs and symptoms of infection  - Monitor lab/diagnostic results  - Monitor all insertion sites, i.e. indwelling lines, tubes, and drains  - Monitor endotracheal if appropriate and nasal secretions for changes in amount and color  - [place] appropriate cooling/warming therapies per order  - Administer medications as ordered  - Instruct and encourage patient and family to use good hand hygiene technique  - Identify and instruct in appropriate isolation precautions for identified infection/condition  Outcome: Progressing  Goal: Absence of fever/infection during neutropenic period  Description: INTERVENTIONS:  - Monitor WBC    Outcome: Progressing     Problem: SAFETY ADULT  Goal: Patient will remain free of falls  Description: INTERVENTIONS:  - Educate patient/family on patient safety including physical limitations  - Instruct patient to call for assistance with activity   - Consult OT/PT to assist with strengthening/mobility   - Keep Call bell within reach  - Keep bed low and locked with side rails adjusted as appropriate  - Keep care items and personal belongings within reach  - Initiate and maintain comfort rounds  - Make Fall Risk Sign visible to staff  - Offer Toileting every 2 Hours,  in advance of need  - Initiate/Maintain chair/bed alarm  - Obtain necessary fall risk management equipment: walker  - Apply yellow socks and bracelet for high fall risk patients  - Consider moving patient to room near nurses station  Outcome: Progressing  Goal: Maintain or return to baseline ADL function  Description: INTERVENTIONS:  -  Assess patient's ability to carry out ADLs; assess patient's baseline for ADL function and identify physical deficits which impact ability to perform ADLs (bathing, care of mouth/teeth, toileting, grooming, dressing, etc.)  - Assess/evaluate cause of self-care deficits   - Assess range of motion  - Assess patient's mobility; develop plan if impaired  - Assess patient's need for assistive devices and provide as appropriate  - Encourage maximum independence but intervene and supervise when necessary  - Involve family in performance of ADLs  - Assess for home care needs following discharge   - Consider OT consult to assist with ADL evaluation and planning for discharge  - Provide patient education as appropriate  Outcome: Progressing  Goal: Maintains/Returns to pre admission functional level  Description: INTERVENTIONS:  - Perform AM-PAC 6 Click Basic Mobility/ Daily Activity assessment daily.  - Set and communicate daily mobility goal to care team and patient/family/caregiver.   - Collaborate with rehabilitation services on mobility goals if consulted  - Perform Range of Motion 2 times a day.  - Reposition patient every 2 hours.  - Dangle patient 2 times a day  - Stand patient 2 times a day  - Ambulate patient 2 times a day  - Out of bed to chair 2 times a day   - Out of bed for meals 2 times a day  - Out of bed for toileting  - Record patient progress and toleration of activity level   Outcome: Progressing     Problem: DISCHARGE PLANNING  Goal: Discharge to home or other facility with appropriate resources  Description: INTERVENTIONS:  - Identify barriers to discharge w/patient and  caregiver  - Arrange for needed discharge resources and transportation as appropriate  - Identify discharge learning needs (meds, wound care, etc.)  - Arrange for interpretive services to assist at discharge as needed  - Refer to Case Management Department for coordinating discharge planning if the patient needs post-hospital services based on physician/advanced practitioner order or complex needs related to functional status, cognitive ability, or social support system  Outcome: Progressing     Problem: Knowledge Deficit  Goal: Patient/family/caregiver demonstrates understanding of disease process, treatment plan, medications, and discharge instructions  Description: Complete learning assessment and assess knowledge base.  Interventions:  - Provide teaching at level of understanding  - Provide teaching via preferred learning methods  Outcome: Progressing     Problem: RESPIRATORY - ADULT  Goal: Achieves optimal ventilation and oxygenation  Description: INTERVENTIONS:  - Assess for changes in respiratory status  - Assess for changes in mentation and behavior  - Position to facilitate oxygenation and minimize respiratory effort  - Oxygen administered by appropriate delivery if ordered  - Initiate smoking cessation education as indicated  - Encourage broncho-pulmonary hygiene including cough, deep breathe, Incentive Spirometry  - Assess the need for suctioning and aspirate as needed  - Assess and instruct to report SOB or any respiratory difficulty  - Respiratory Therapy support as indicated  Outcome: Progressing

## 2025-02-04 NOTE — ASSESSMENT & PLAN NOTE
Lab Results   Component Value Date    EGFR 42 02/04/2025    EGFR 47 02/03/2025    EGFR 38 02/02/2025    CREATININE 1.18 02/04/2025    CREATININE 1.07 02/03/2025    CREATININE 1.27 02/02/2025     Creatinine stable at baseline  Avoid hypotension and nephrotoxic agents  Monitor daily BMP

## 2025-02-04 NOTE — CASE MANAGEMENT
Case Management Discharge Planning Note    Patient name Rosalind Wise  Location [address] [facility]/[address] [place] [address]-* MRN [id number]  : 1940 Date 2025       Current Admission Date: 2025  Current Admission Diagnosis:Acute on chronic diastolic congestive heart failure (HCC)   Patient Active Problem List    Diagnosis Date Noted Date Diagnosed    Secondary hyperparathyroidism ([facility]) 2025     Acute non-recurrent frontal sinusitis 2025     Hypertensive heart and chronic kidney disease with heart failure and stage 1 through stage 4 chronic kidney disease, or chronic kidney disease ([facility]) 2024     Other insomnia 2024     Hypomagnesemia 2024     Depression 2024     Intentional overdose ([facility]) 2024     Atrial fibrillation with RVR ([facility]) 2024     Persistent atrial fibrillation ([facility]) 2023     Cervical radiculopathy      Spinal stenosis of lumbar region with neurogenic claudication      Sciatica of left side 2021     Status post right frontotemporal replacement cranioplasty 2020     Prediabetes 2020     Peripheral arteriosclerosis ([facility]) 2019     SOB (shortness of breath) on exertion 10/26/2018     Gastroesophageal reflux disease without esophagitis 2018     Hyperlipemia 11/15/2017     Hyperuricemia 2017     Pseudogout of left wrist 2017     Acute on chronic diastolic congestive heart failure (HCC) 2017     CKD (chronic kidney disease), stage III ([facility]) 2017     Benign essential hypertension 2016     Chronic diastolic (congestive) heart failure (HCC) 2016     Hypothyroidism 2016     Diverticulitis of colon 2016     WENDI (obstructive sleep apnea) 2016     Hallux abductovalgus with bunions, unspecified laterality 2013     Chronic gout without tophus 2013     Arthropathy of knee 2012     Hiatal hernia 2012       LOS (days): 3  Geometric Mean LOS (GMLOS) (days):  Problem: Falls - Risk of 
Goal: *Absence of Falls Document Mihaela Galindo Fall Risk and appropriate interventions in the flowsheet. Outcome: Progressing Towards Goal 
Fall Risk Interventions: 
  
 
  
 
Medication Interventions: Teach patient to arise slowly, Patient to call before getting OOB 3.9  Days to GMLOS:1     OBJECTIVE:  Risk of Unplanned Readmission Score: 14.47         Current admission status: Inpatient   Preferred Pharmacy:   [facility] - [place], [place] - [address]  [address] [address]  Phone: [phone] Fax: [phone]    [id number] - [place], [place] - [address]  [address] [address]  Phone: [phone] Fax: [phone]    Primary Care Provider: Suzanna Lopez MD    Primary Insurance: MEDICARE  Secondary Insurance: [employer]    DISCHARGE DETAILS:    Discharge planning discussed with:: Patient and DIL Kateryna  Freedom of Choice: Yes    Comments - Freedom of Choice: Preference is for patient to return home. She is medically cleared and DIL Kateryna is at bedside to transport patient home.  Per patient and DIL, patient is independent at baseline and lives with Kateryna and patient's son.  There are no anticipated needs for discharge.      CM contacted family/caregiver?: Yes  Were Treatment Team discharge recommendations reviewed with patient/caregiver?: Yes  Did patient/caregiver verbalize understanding of patient care needs?: Yes  Were patient/caregiver advised of the risks associated with not following Treatment Team discharge recommendations?: Yes    Contacts  Patient Contacts: DIL Kateryna  Relationship to Patient:: Family  Contact Method: In Person  Reason/Outcome: Discharge Planning    Requested Home Health Care         Is the patient interested in HHC at discharge?: No    DME Referral Provided  Referral made for DME?: No    Other Referral/Resources/Interventions Provided:  Interventions: None Indicated    Would you like to participate in our [facility] service program?  : No - Declined    Treatment Team Recommendation: Home  Discharge Destination Plan:: Home  Transport at Discharge : Family         IMM Given (Date):: 02/04/25  IMM Given to:: Family (IMM reviewed with patient and DIL and signed by DIL  Kateryna.  Patient and DIL are in agreement with discharge determination.  Copy declined by patient (has previous copy) and copy placed in scan bin for chart.)

## 2025-02-04 NOTE — DISCHARGE INSTR - AVS FIRST PAGE
Follow ups:  -PCP  -Cardiology    See medication adjustments as listed below    Other instructions:  -Low sodium 2g diet  -Activity as tolerated

## 2025-02-04 NOTE — ASSESSMENT & PLAN NOTE
Wt Readings from Last 3 Encounters:   02/04/25 62.1 kg (137 lb)   01/30/25 68.9 kg (152 lb)   01/06/25 65.3 kg (144 lb)       CXR with pneumonitis vs pulmonary edema  Received IV lasix 40mg x 1 dose with improvement in breathing   Continue Cozaar 50mg QD, Aldactone 12.5mg QD, Jardiance 10mg QD  Transitioned to PTA oral Bumex 1mg QD  NM stress test (2/3/25) negative for acute ischemia  Cardiac, low sodium diet

## 2025-02-04 NOTE — ASSESSMENT & PLAN NOTE
Wt Readings from Last 3 Encounters:   02/04/25 62.1 kg (137 lb)   01/30/25 68.9 kg (152 lb)   01/06/25 65.3 kg (144 lb)     chest x-ray concerning for pneumonitis versus pulmonary edema  improvement in respiratory status after receiving Lasix 40 mg IV times one  continue Lopressor 12.5 mg Q 12 hours  2/3/2025 blood pressures have been low since starting SGL T2 inhibitor, will discontinue evening dose of Cozaar 25 mg and continue Cozaar 50 mg once a day with parameters.  continue Aldactone 12.5 mg daily  Continue Jardiance 10 mg daily  monitor I and O, daily weights and labs  2/3/2025 Lexiscan nuclear stress test: group study for major reversible ischemia.  No transient ischemic dilatation  low sodium diet

## 2025-02-04 NOTE — PLAN OF CARE
Problem: PAIN - ADULT  Goal: Verbalizes/displays adequate comfort level or baseline comfort level  Description: Interventions:  - Encourage patient to monitor pain and request assistance  - Assess pain using appropriate pain scale  - Administer analgesics based on type and severity of pain and evaluate response  - Implement non-pharmacological measures as appropriate and evaluate response  - Consider cultural and social influences on pain and pain management  - Notify physician/advanced practitioner if interventions unsuccessful or patient reports new pain  2/3/2025 1958 by Harshad Quinonez RN  Outcome: Progressing  2/3/2025 1958 by Harshad Quinonez RN  Outcome: Progressing     Problem: INFECTION - ADULT  Goal: Absence or prevention of progression during hospitalization  Description: INTERVENTIONS:  - Assess and monitor for signs and symptoms of infection  - Monitor lab/diagnostic results  - Monitor all insertion sites, i.e. indwelling lines, tubes, and drains  - Monitor endotracheal if appropriate and nasal secretions for changes in amount and color  - [place] appropriate cooling/warming therapies per order  - Administer medications as ordered  - Instruct and encourage patient and family to use good hand hygiene technique  - Identify and instruct in appropriate isolation precautions for identified infection/condition  2/3/2025 1958 by Harshad Quinonez RN  Outcome: Progressing  2/3/2025 1958 by Harshad Quinonez RN  Outcome: Progressing  Goal: Absence of fever/infection during neutropenic period  Description: INTERVENTIONS:  - Monitor WBC    2/3/2025 1958 by Harshad Quinonez RN  Outcome: Progressing  2/3/2025 1958 by Harshad Quinonez RN  Outcome: Progressing     Problem: SAFETY ADULT  Goal: Patient will remain free of falls  Description: INTERVENTIONS:  - Educate patient/family on patient safety including physical limitations  - Instruct patient to call for assistance with activity   - Consult OT/PT to assist with  strengthening/mobility   - Keep Call bell within reach  - Keep bed low and locked with side rails adjusted as appropriate  - Keep care items and personal belongings within reach  - Initiate and maintain comfort rounds  - Make Fall Risk Sign visible to staff  - Offer Toileting every 2 Hours, in advance of need  - Initiate/Maintain chair/bed alarm  - Obtain necessary fall risk management equipment: walker  - Apply yellow socks and bracelet for high fall risk patients  - Consider moving patient to room near nurses station  2/3/2025 1958 by Harshad Quinonez RN  Outcome: Progressing  2/3/2025 1958 by Harshad Quinonez RN  Outcome: Progressing  Goal: Maintain or return to baseline ADL function  Description: INTERVENTIONS:  -  Assess patient's ability to carry out ADLs; assess patient's baseline for ADL function and identify physical deficits which impact ability to perform ADLs (bathing, care of mouth/teeth, toileting, grooming, dressing, etc.)  - Assess/evaluate cause of self-care deficits   - Assess range of motion  - Assess patient's mobility; develop plan if impaired  - Assess patient's need for assistive devices and provide as appropriate  - Encourage maximum independence but intervene and supervise when necessary  - Involve family in performance of ADLs  - Assess for home care needs following discharge   - Consider OT consult to assist with ADL evaluation and planning for discharge  - Provide patient education as appropriate  2/3/2025 1958 by Harshad Quinonez RN  Outcome: Progressing  2/3/2025 1958 by Harshad Quinonez RN  Outcome: Progressing  Goal: Maintains/Returns to pre admission functional level  Description: INTERVENTIONS:  - Perform AM-PAC 6 Click Basic Mobility/ Daily Activity assessment daily.  - Set and communicate daily mobility goal to care team and patient/family/caregiver.   - Collaborate with rehabilitation services on mobility goals if consulted  - Perform Range of Motion 2 times a day.  - Reposition  patient every 2 hours.  - Dangle patient 2 times a day  - Stand patient 2 times a day  - Ambulate patient 2 times a day  - Out of bed to chair 2 times a day   - Out of bed for meals 2 times a day  - Out of bed for toileting  - Record patient progress and toleration of activity level   2/3/2025 1958 by Harshad Quinonez RN  Outcome: Progressing  2/3/2025 1958 by Harshad Quinonez RN  Outcome: Progressing     Problem: DISCHARGE PLANNING  Goal: Discharge to home or other facility with appropriate resources  Description: INTERVENTIONS:  - Identify barriers to discharge w/patient and caregiver  - Arrange for needed discharge resources and transportation as appropriate  - Identify discharge learning needs (meds, wound care, etc.)  - Arrange for interpretive services to assist at discharge as needed  - Refer to Case Management Department for coordinating discharge planning if the patient needs post-hospital services based on physician/advanced practitioner order or complex needs related to functional status, cognitive ability, or social support system  2/3/2025 1958 by Harshad Quinonez RN  Outcome: Progressing  2/3/2025 1958 by Harshad Quinonez RN  Outcome: Progressing     Problem: Knowledge Deficit  Goal: Patient/family/caregiver demonstrates understanding of disease process, treatment plan, medications, and discharge instructions  Description: Complete learning assessment and assess knowledge base.  Interventions:  - Provide teaching at level of understanding  - Provide teaching via preferred learning methods  2/3/2025 1958 by Harshad Quinonez RN  Outcome: Progressing  2/3/2025 1958 by Harshad Quinonez RN  Outcome: Progressing     Problem: RESPIRATORY - ADULT  Goal: Achieves optimal ventilation and oxygenation  Description: INTERVENTIONS:  - Assess for changes in respiratory status  - Assess for changes in mentation and behavior  - Position to facilitate oxygenation and minimize respiratory effort  - Oxygen administered by  appropriate delivery if ordered  - Initiate smoking cessation education as indicated  - Encourage broncho-pulmonary hygiene including cough, deep breathe, Incentive Spirometry  - Assess the need for suctioning and aspirate as needed  - Assess and instruct to report SOB or any respiratory difficulty  - Respiratory Therapy support as indicated  2/3/2025 1958 by Harshad Quinonez, RN  Outcome: Progressing  2/3/2025 1958 by Harshad Quinonez, RN  Outcome: Progressing

## 2025-02-05 ENCOUNTER — PATIENT OUTREACH (OUTPATIENT)
Dept: CASE MANAGEMENT | Facility: OTHER | Age: 85
End: 2025-02-05

## 2025-02-05 DIAGNOSIS — I50.33 ACUTE ON CHRONIC DIASTOLIC CONGESTIVE HEART FAILURE (HCC): Primary | ICD-10-CM

## 2025-02-05 PROBLEM — J01.10 ACUTE NON-RECURRENT FRONTAL SINUSITIS: Status: RESOLVED | Noted: 2025-01-06 | Resolved: 2025-02-05

## 2025-02-05 NOTE — PROGRESS NOTES
Outpatient Care Management Note:    New heart failure referral received. Patient was hospitalized from 1/31-2/4/25 with acute on chronic diastolic CHF.  She was treated with IV lasix. Her stress test was negative for acute ischemia. She is to follow up with her PCP and with cardiology (2/6).     CM attempted to call Rosalind but her phone was busy.     Hospital discharge weight:  137 lbs  Cardiology follow up appointment: 2/6  Home health care agency: NA  Start of Care Date: NA

## 2025-02-05 NOTE — PROGRESS NOTES
Progress Note - Cardiology Office  [facility]    Rosalind Wise 84 y.o. female MRN: [id number]  : 1940  Encounter: [id number]      Assessment & Plan  Benign essential hypertension  Blood pressure is low at 90/60  Will decrease losartan to 25 mg  Chronic diastolic (congestive) heart failure (HCC)  Wt Readings from Last 3 Encounters:   25 65.8 kg (145 lb)   25 62.1 kg (137 lb)   25 68.9 kg (152 lb)             Acute on chronic diastolic congestive heart failure (HCC)  Wt Readings from Last 3 Encounters:   25 65.8 kg (145 lb)   25 62.1 kg (137 lb)   25 68.9 kg (152 lb)             Hyperlipidemia, unspecified hyperlipidemia type    Atrial fibrillation with RVR (HCC)    Persistent atrial fibrillation (HCC)    Hypertensive heart and chronic kidney disease with heart failure and stage 1 through stage 4 chronic kidney disease, or chronic kidney disease (HCC)  Wt Readings from Last 3 Encounters:   25 65.8 kg (145 lb)   25 62.1 kg (137 lb)   25 68.9 kg (152 lb)                ASSESSMENT:   Chronic diastolic heart failure  On Aldactone, Jardiance and losartan    2/3/2025 Lexiscan nuclear stress test:   Negative study for major reversible ischemia.  73%, no transient ischemic dilatation    TTE, 2024:  EF 55%, abnormal diastolic function, mildly reduced RV systolic function  Severe biatrial enlargement  Mild aortic stenosis with mean gradient of 6 and peak of 11 mmHg  Severe MAC, moderate to severe MR  Moderate TR, severe pulmonary hypertension with RSVP of 95 mmHg    Benign essential hypertension  Blood pressure is low at 90/60 with heart rate of 85/min  On losartan 50 mg, Lopressor 12.5 mg every 12 hours and Aldactone 12.5 mg for diastolic heart failure  Will decrease losartan to 25 mg    Severe pulmonary hypertension    Persistent atrial fibrillation  Rate controlled and anticoagulated  On Eliquis 5 mg twice daily and Lopressor 12.5  mg every 12 hours    CKD  WENDI, on CPAP  Hypothyroidism      RECOMMENDATIONS:  Again reemphasized on patient to monitor weight daily at home which they have not started doing since hospital discharge.  In view of her low blood pressure, I will decrease her losartan to 25 mg daily  Continue other cardiac medications including anticoagulation  Again advised patient and family to call us if her weight increases by 3 pounds in 1 day or 5 pounds in a week      Please call [phone] if any questions.    HPI :     Rosalind Wise is a 84 y.o. year old female who came for follow up.  She claims to be feeling better and denies any difficulty in breathing or chest pain.  She has very mild lower extremity edema and I again advised her to wear compression stockings and also told the family to help her putting them on  Her blood pressure is low at 90/60, therefore I will decrease her losartan for now to 25 mg    REVIEW OF SYSTEMS:  Denies chest pain, any increased dyspnea, palpitations or syncope  Has very mild lower extremity edema      Historical Information   Past Medical History:   Diagnosis Date    Anxiety     Arthritis     joints    Bunion     Cataract     Disease of thyroid gland     Eczema     GERD (gastroesophageal reflux disease)     Gout     Heart failure (HCC)     Hepatitis     Hiatal hernia     Hypertension     Nephrolithiasis 2017    Onychomycosis     last assessed: 16    Orthopnea     resolved: 16    Pseudogout of left wrist     Sleep apnea     wears CPAP    Stroke (HCC)      Past Surgical History:   Procedure Laterality Date    ABDOMINAL SURGERY          BRAIN HEMATOMA EVACUATION Right 2020    Procedure: Right decompressive craniectomy and evacuation of intraparenchymal hematoma;  Surgeon: Apolinar Herrera MD;  Location: [facility];  Service: Neurosurgery    BREAST SURGERY Right     cyst removal    CARPAL TUNNEL RELEASE Left     CARPAL TUNNEL RELEASE Right     CATARACT  EXTRACTION       SECTION  1960    x1    COLONOSCOPY N/A 2018    Procedure: COLONOSCOPY;  Surgeon: Alejandro Carlson MD;  Location: [facility] GI LAB;  Service: Gastroenterology    DILATION AND CURETTAGE OF UTERUS  1967    EYE SURGERY Left 2006    cataracts    HERNIA REPAIR      umbilical hernia    INCISIONAL HERNIA REPAIR      incarcerated    KNEE ARTHROSCOPY Right     TX RPLCMT BONE FLAP/PROSTHETIC PLATE SKULL Right 2020    Procedure: right frontotemporal replacement cranioplasty;  Surgeon: Apolinar Herrera MD;  Location:  MAIN OR;  Service: Neurosurgery    TX XCAPSL CTRC RMVL INSJ IO LENS PROSTH W/O ECP Right 3/27/2017    Procedure: EXTRACTION EXTRACAPSULAR CATARACT PHACO INTRAOCULAR LENS (IOL);  Surgeon: Trip Gilbert MD;  Location: [facility] MAIN OR;  Service: Ophthalmology     Social History     Substance and Sexual Activity   Alcohol Use Yes    Comment: Only on special occasions     Social History     Substance and Sexual Activity   Drug Use Never     Social History     Tobacco Use   Smoking Status Never   Smokeless Tobacco Never     Family History:   Family History   Problem Relation Age of Onset    Diabetes Mother     Coronary artery disease Mother     Arthritis Mother     Hypertension Mother     Hypertension Father     Diabetes Brother     Diabetes Son     Arthritis Family        Meds/Allergies     No Known Allergies    Current Outpatient Medications:     apixaban (Eliquis) 2.5 mg, TAKE 1 TABLET TWICE DAILY, Disp: 180 tablet, Rfl: 1    atorvastatin (LIPITOR) 40 mg tablet, Take 1 tablet (40 mg total) by mouth every evening, Disp: 90 tablet, Rfl: 1    bumetanide (BUMEX) 1 mg tablet, Take 1 tablet (1 mg total) by mouth daily, Disp: 90 tablet, Rfl: 3    Cholecalciferol (Vitamin D3) 50 MCG CAPS, Take by mouth in the morning, Disp: , Rfl:     Diclofenac Sodium (VOLTAREN) 1 %, Apply 2 g topically in the morning APPLY NO MORE THAN 3 DAYS IN A ROW THEN TAKE A BREAK FOR ONE WEEK FROM USE., Disp: 100 g, Rfl:  1    Empagliflozin (Jardiance) 10 MG TABS tablet, Take 1 tablet (10 mg total) by mouth every morning, Disp: 30 tablet, Rfl: 0    hydrOXYzine HCL (ATARAX) 10 mg tablet, Take 1 tablet (10 mg total) by mouth daily at bedtime as needed (insomnia), Disp: 90 tablet, Rfl: 1    levothyroxine 88 mcg tablet, Take 1 tablet (88 mcg total) by mouth daily, Disp: 90 tablet, Rfl: 1    losartan (COZAAR) 25 mg tablet, Take 2 tablets (50 mg total) by mouth daily, Disp: 60 tablet, Rfl: 2    Magnesium 400 MG CAPS, Take by mouth in the morning Take 500 mg. BID, Disp: , Rfl:     metoprolol tartrate (LOPRESSOR) 25 mg tablet, Take 0.5 tablets (12.5 mg total) by mouth every 12 (twelve) hours, Disp: 90 tablet, Rfl: 1    pantoprazole (PROTONIX) 20 mg tablet, Take 1 tablet (20 mg total) by mouth daily, Disp: 90 tablet, Rfl: 1    spironolactone (ALDACTONE) 25 mg tablet, TAKE 1/2 TABLET EVERY DAY, Disp: 45 tablet, Rfl: 1    clotrimazole-betamethasone (LOTRISONE) 1-0.05 % cream, Apply topically 2 (two) times a day for 25 days, Disp: 45 g, Rfl: 1    Current Facility-Administered Medications:     triamcinolone acetonide (KENALOG-40) 40 mg/mL injection 20 mg, 20 mg, Infiltration, , , 20 mg at 10/03/24 1045    triamcinolone acetonide (Kenalog-40) 40 mg/mL injection 20 mg, 20 mg, Intra-articular, , , 20 mg at 12/12/24 1115    Vitals: Blood pressure 90/60, pulse 85, height 5' (1.524 m), weight 65.8 kg (145 lb), SpO2 99%, not currently breastfeeding.    Body mass index is 28.32 kg/m².  Vitals:    02/06/25 1333   Weight: 65.8 kg (145 lb)     BP Readings from Last 3 Encounters:   02/06/25 90/60   02/04/25 (!) 129/107   01/30/25 140/68       Physical Exam:  Physical Exam    Neurologic:  Alert & oriented x 3, no new focal deficits, Not in any acute distress,  Constitutional: Obese  Eyes:  Pupil equal and reacting to light, conjunctiva normal,   HENT:  Atraumatic, oropharynx moist, Neck- normal range of motion, no tenderness,  Neck supple, No JVP, No LNP    Respiratory:  Bilateral air entry, mostly clear to auscultation  Cardiovascular: S1-S2 irregularly irregular rhythm with 1/6 systolic murmur  GI:  Soft, nondistended, normal bowel sounds, nontender, no hepatosplenomegaly appreciated.  Musculoskeletal:  No tenderness, no deformities.   Skin:  Well hydrated, no rash   Lymphatic:  No lymphadenopathy noted   Extremities: Trace edema         Diagnostic Studies Review Cardio:      EKG: Atrial fibrillation, heart rate 85/min, pulmonary disease pattern, IRBBB, RVH with repolarization abnormality, nonspecific T wave abnormality    Cardiac testing:   Results for orders placed during the hospital encounter of 02/10/20    Echo complete with contrast if indicated    [place] [address]  [phone]    Transthoracic Echocardiogram  2D, M-mode, Doppler, and Color Doppler    Study date:  2020    Patient: JOBY SERVIN  MR number: [id number]  Account number: [id number]  : 1940  Age: 79 years  Gender: Female  Status: Inpatient  Location: Bedside  Height: 61 in  Weight: 208 lb  BP: 167/ 70 mmHg    Indications: CVA    Diagnoses: I63.9 - Cerebral infarction, unspecified    Sonographer:  COLT Gillespie  Referring Physician:  Neela Noe DO  Group:  [facility]  Cardiology Fellow:  Ruth Carranza MD  Interpreting Physician:  Kit Clayton MD    SUMMARY    LEFT VENTRICLE:  Systolic function was normal. Ejection fraction was estimated to be 65 %.  The changes were consistent with concentric remodeling (increased wall thickness with normal wall mass).  Left ventricular diastolic function parameters were abnormal.    RIGHT VENTRICLE:  The ventricle was mildly dilated.  Systolic function was low normal.    LEFT ATRIUM:  The atrium was moderately to markedly dilated.    RIGHT ATRIUM:  The atrium was mildly dilated.    MITRAL VALVE:  The annulus was  dilated.  There was marked mass-like annular calcification (caseous calcification)  Transmitral velocity was increased due to valvular stenosis.  There was mild stenosis related to mitral annular calcification. The mean mitral gradient is 5 mm of Hg at a heart rate of 47 bpm.  There was moderate to severe regurgitation.  E wave velocity 2m/s.    AORTIC VALVE:  There was mild regurgitation.    TRICUSPID VALVE:  There was mild to moderate regurgitation.  Pulmonary artery systolic pressure was markedly increased.  Estimated peak PA pressure was 79 mmHg.    PULMONARY VEINS:  There was systolic blunting in the pulmonary vein(s).    PERICARDIUM:  There was a left pleural effusion.    COMPARISONS:  Comparison was made with the previous study of 26-Feb-2019. Pulmonary artery pressure has increased.    HISTORY: PRIOR HISTORY: WENDI,HTN,CKD3,HLD,Edema,Obesity    PROCEDURE: The procedure was performed at the bedside. This was a routine study. The transthoracic approach was used. The study included complete 2D imaging, M-mode, complete spectral Doppler, and color Doppler. The heart rate was 57 bpm,  at the start of the study. Image quality was adequate.    LEFT VENTRICLE: Size was normal. Systolic function was normal. Ejection fraction was estimated to be 65 %. Although no diagnostic regional wall motion abnormality was identified, this possibility cannot be completely excluded on the basis  of this study. Wall thickness was mildly increased. The changes were consistent with concentric remodeling (increased wall thickness with normal wall mass). DOPPLER: Left ventricular diastolic function parameters were abnormal.    RIGHT VENTRICLE: The ventricle was mildly dilated. Systolic function was low normal.    LEFT ATRIUM: The atrium was moderately to markedly dilated.    RIGHT ATRIUM: The atrium was mildly dilated.    MITRAL VALVE: The annulus was dilated. There was marked mass-like annular calcification (caseous calcification)  DOPPLER: Transmitral velocity was increased due to valvular stenosis. There was mild stenosis related to mitral annular  calcification. The mean mitral gradient is 5 mm of Hg at a heart rate of 47 bpm. There was moderate to severe regurgitation.  E wave velocity 2m/s. The regurgitant jet was centrally directed.    AORTIC VALVE: The valve was probably trileaflet. Leaflets exhibited mild calcification, normal cuspal separation, and sclerosis. DOPPLER: Transaortic velocity was within the normal range. There was no evidence for stenosis. There was mild  regurgitation.    TRICUSPID VALVE: The valve structure was normal. There was normal leaflet separation. DOPPLER: The transtricuspid velocity was within the normal range. There was no evidence for stenosis. There was mild to moderate regurgitation. Pulmonary  artery systolic pressure was markedly increased. Estimated peak PA pressure was 79 mmHg.    PULMONIC VALVE: DOPPLER: The transpulmonic velocity was within the normal range. There was no evidence for stenosis. There was mild regurgitation.    PERICARDIUM: There was a left pleural effusion.    AORTA: The root exhibited normal size.    SYSTEMIC VEINS: IVC: The inferior vena cava was mildly dilated. Respirophasic changes were blunted (less than 50% variation).    PULMONARY VEINS: DOPPLER: There was systolic blunting in the pulmonary vein(s).    MEASUREMENT TABLES    OTHER ECHO MEASUREMENTS  (Reference normals)  Estimated CVP   10 mmHg   (--)    SYSTEM MEASUREMENT TABLES    2D  %FS: 35.19 %  Ao Diam: 2.29 cm  EDV(Teich): 73.72 ml  EF(Teich): 65.02 %  ESV(Teich): 25.79 ml  IVSd: 1.1 cm  LA Area: 34 cm2  LA Diam: 3.99 cm  LVEDV MOD A4C: 96.57 ml  LVEF MOD A4C: 66.67 %  LVESV MOD A4C: 32.18 ml  LVIDd: 4.09 cm  LVIDs: 2.65 cm  LVLd A4C: 7.52 cm  LVLs A4C: 5.94 cm  LVPWd: 1.29 cm  RA Area: 17.42 cm2  RVIDd: 4.26 cm  SV MOD A4C: 64.39 ml  SV(Teich): 47.93 ml    CF  MR Als.Jesse: 0.45 m/s  MR Flow: 295.14 ml/s  MR Rad: 0.89  cm    CW  AR Dec Atlantic: 2.41 m/s2  AR Dec Time: 1512.37 ms  AR PHT: 438.59 ms  AR Vmax: 3.65 m/s  AR maxP.29 mmHg  HR: 72.65 BPM  MR VTI: 231.82 cm  MR Vmax: 5.67 m/s  MR Vmean: 4.56 m/s  MR maxP.2 mmHg  MR meanP.88 mmHg  MV VTI: 72.9 cm  MV Vmax: 2.35 m/s  MV Vmean: 0.88 m/s  MV maxP.13 mmHg  MV meanP.75 mmHg  TR Vmax: 4.16 m/s  TR maxP.13 mmHg    MM  TAPSE: 1.84 cm    PW  E': 0.04 m/s  E/E': 47.99  MR ERO: 0.52 cm2  MR RV: 120.66 ml  MV A Jesse: 0.63 m/s  MV Dec Atlantic: 6.61 m/s2  MV DecT: 291.72 ms  MV E Jesse: 1.93 m/s  MV E/A Ratio: 3.07  MV PHT: 84.6 ms  MVA By PHT: 2.6 cm2    [facility]    Prepared and electronically signed by    Kit Clayton MD  Signed 2020 13:22:26        Results for orders placed during the hospital encounter of 24    Echo complete w/ contrast if indicated    Interpretation Summary    Left Ventricle: Left ventricular cavity size is normal. Wall thickness is mildly increased. The left ventricular ejection fraction is 55% by visual estimation. Systolic function is normal. Wall motion is normal. Diastolic function is abnormal.  Left atrial filling pressure is elevated.    Right Ventricle: Systolic function is mildly reduced. Abnormal tricuspid annular plane systolic excursion (TAPSE) < 1.7 cm.    Left Atrium: The atrium is severely dilated (>48 mL/m2).    Right Atrium: The atrium is severely dilated.    Aortic Valve: The aortic valve is functionally bicuspid. The leaflets are mildly thickened. The leaflets are mildly calcified. There is mildly reduced mobility. There is moderate regurgitation. There is mild stenosis. The aortic valve peak gradient is 11 mmHg. The aortic valve mean gradient is 6 mmHg.    Mitral Valve: There is mild thickening. There is moderate calcification. There is moderately reduced mobility of the posterior leaflet. There is severe annular calcification. There is moderate to severe  regurgitation. There is no evidence of stenosis.    Tricuspid Valve: There is moderate regurgitation. The right ventricular systolic pressure is severely elevated. The estimated right ventricular systolic pressure is 95.00 mmHg.    Compared to previous exam, there is no significant change.        Results for orders placed during the hospital encounter of 01/31/25    NM Myocardial Perfusion Spect (Pharmacological Induced Stress and/or Rest)    Interpretation Summary    Stress ECG: The patient after exercising for 1 min and had a maximal HR of 127 bpm (94% of MPHR) and 1.0 METS. The patient experienced no angina during the test. The patient reached the end of the protocol. Blood pressure demonstrated a normal response and heart rate demonstrated a normal response to stress. The patient's heart rate recovery was normal.    Stress ECG: The stress ECG is equivocal for ischemia after pharmacologic vasodilation, without reproduction of symptoms.    Stress Combined Conclusion: Left ventricular perfusion is normal.    Stress Function: Left ventricular function post-stress is normal. Stress ejection fraction is 73%.    Perfusion: No major reversible perfusion defects with past with stress imaging    Perfusion Defect Conclusion: The stress/rest perfusion ratio is 0.94. There is no evidence of transient ischemic dilation (TID).    Negative study for major reversible scintigraphic changes with pharmacological vasodilation to suggest ischemia.  Normal ejection fraction by gated EF.          Imaging:  Chest X-Ray:   No Chest XR results available for this patient.    CT-scan of the chest:     No CTA results available for this patient.  Lab Review   Lab Results   Component Value Date    WBC 7.97 02/03/2025    HGB 12.2 02/03/2025    HCT 37.9 02/03/2025    MCV 87 02/03/2025    RDW 14.4 02/03/2025     02/03/2025     BMP:  Lab Results   Component Value Date    SODIUM 141 02/04/2025    K 3.9 02/04/2025     02/04/2025    CO2  "25 02/04/2025    BUN 28 (H) 02/04/2025    CREATININE 1.18 02/04/2025    GLUC 97 02/04/2025    GLUF 86 02/01/2025    CALCIUM 9.2 02/04/2025    CORRECTEDCA 10.1 06/29/2023    EGFR 42 02/04/2025    MG 1.9 02/03/2025     LFT:  Lab Results   Component Value Date    AST 33 01/31/2025    ALT 20 01/31/2025    ALKPHOS 101 01/31/2025    TP 7.4 01/31/2025    ALB 4.2 01/31/2025      No components found for: \"TSH3\"  Lab Results   Component Value Date    JNN0QMXOTNBE 0.997 02/01/2025     Lab Results   Component Value Date    HGBA1C 5.8 (H) 01/03/2025     Lipid Profile:   Lab Results   Component Value Date    CHOLESTEROL 115 01/03/2025    HDL 56 01/03/2025    LDLCALC 45 01/03/2025    TRIG 72 01/03/2025     Lab Results   Component Value Date    CHOLESTEROL 115 01/03/2025    CHOLESTEROL 102 07/03/2024     Lab Results   Component Value Date    TROPONINI 0.07 (H) 03/25/2021     Lab Results   Component Value Date    NTBNP 463 (H) 01/15/2016      Recent Results (from the past 4 weeks)   ECG 12 lead    Collection Time: 01/31/25 11:02 AM   Result Value Ref Range    Ventricular Rate 102 BPM    Atrial Rate 80 BPM    MS Interval  ms    QRSD Interval 114 ms    QT Interval 382 ms    QTC Interval 497 ms    P Axis  degrees    QRS Axis 118 degrees    T Wave Axis 16 degrees   CBC and differential    Collection Time: 01/31/25 11:34 AM   Result Value Ref Range    WBC 8.10 4.31 - 10.16 Thousand/uL    RBC 4.76 3.81 - 5.12 Million/uL    Hemoglobin 13.3 11.5 - 15.4 g/dL    Hematocrit 41.8 34.8 - 46.1 %    MCV 88 82 - 98 fL    MCH 27.9 26.8 - 34.3 pg    MCHC 31.8 31.4 - 37.4 g/dL    RDW 14.5 11.6 - 15.1 %    MPV 11.7 8.9 - 12.7 fL    Platelets 138 (L) 149 - 390 Thousands/uL    nRBC 0 /100 WBCs    Segmented % 85 (H) 43 - 75 %    Immature Grans % 0 0 - 2 %    Lymphocytes % 8 (L) 14 - 44 %    Monocytes % 6 4 - 12 %    Eosinophils Relative 1 0 - 6 %    Basophils Relative 0 0 - 1 %    Absolute Neutrophils 6.82 1.85 - 7.62 Thousands/µL    Absolute Immature " "Grans 0.02 0.00 - 0.20 Thousand/uL    Absolute Lymphocytes 0.68 0.60 - 4.47 Thousands/µL    Absolute Monocytes 0.51 0.17 - 1.22 Thousand/µL    Eosinophils Absolute 0.05 0.00 - 0.61 Thousand/µL    Basophils Absolute 0.02 0.00 - 0.10 Thousands/µL   Comprehensive metabolic panel    Collection Time: 01/31/25 11:34 AM   Result Value Ref Range    Sodium 140 135 - 147 mmol/L    Potassium 4.7 3.5 - 5.3 mmol/L    Chloride 104 96 - 108 mmol/L    CO2 23 21 - 32 mmol/L    ANION GAP 13 4 - 13 mmol/L    BUN 30 (H) 5 - 25 mg/dL    Creatinine 1.18 0.60 - 1.30 mg/dL    Glucose 94 65 - 140 mg/dL    Calcium 9.0 8.4 - 10.2 mg/dL    AST 33 13 - 39 U/L    ALT 20 7 - 52 U/L    Alkaline Phosphatase 101 34 - 104 U/L    Total Protein 7.4 6.4 - 8.4 g/dL    Albumin 4.2 3.5 - 5.0 g/dL    Total Bilirubin 0.99 0.20 - 1.00 mg/dL    eGFR 42 ml/min/1.73sq m   HS Troponin 0hr (reflex protocol)    Collection Time: 01/31/25 11:34 AM   Result Value Ref Range    hs TnI 0hr 31 \"Refer to ACS Flowchart\"- see link ng/L   B-Type Natriuretic Peptide(BNP)    Collection Time: 01/31/25 11:34 AM   Result Value Ref Range     (H) 0 - 100 pg/mL   FLU/COVID Rapid Antigen (30 min. TAT) - Preferred screening test in ED    Collection Time: 01/31/25 11:34 AM    Specimen: Nose; Nares   Result Value Ref Range    SARS COV Rapid Antigen Negative Negative    Influenza A Rapid Antigen Negative Negative    Influenza B Rapid Antigen Negative Negative   Protime-INR    Collection Time: 01/31/25 11:34 AM   Result Value Ref Range    Protime 16.8 (H) 12.3 - 15.0 seconds    INR 1.31 (H) 0.85 - 1.19   APTT    Collection Time: 01/31/25 11:34 AM   Result Value Ref Range    PTT 34 23 - 34 seconds   D-dimer, quantitative    Collection Time: 01/31/25 11:34 AM   Result Value Ref Range    D-Dimer, Quant 0.84 (H) <0.50 ug/ml FEU   HS Troponin I 2hr    Collection Time: 01/31/25  1:29 PM   Result Value Ref Range    hs TnI 2hr 28 \"Refer to ACS Flowchart\"- see link ng/L    Delta 2hr hsTnI -3 " <20 ng/L   Fingerstick Glucose (POCT)    Collection Time: 01/31/25  4:58 PM   Result Value Ref Range    POC Glucose 88 65 - 140 mg/dl   Fingerstick Glucose (POCT)    Collection Time: 01/31/25  8:30 PM   Result Value Ref Range    POC Glucose 114 65 - 140 mg/dl   Basic metabolic panel    Collection Time: 02/01/25  4:31 AM   Result Value Ref Range    Sodium 140 135 - 147 mmol/L    Potassium 3.8 3.5 - 5.3 mmol/L    Chloride 105 96 - 108 mmol/L    CO2 20 (L) 21 - 32 mmol/L    ANION GAP 15 (H) 4 - 13 mmol/L    BUN 30 (H) 5 - 25 mg/dL    Creatinine 1.23 0.60 - 1.30 mg/dL    Glucose 86 65 - 140 mg/dL    Glucose, Fasting 86 65 - 99 mg/dL    Calcium 8.6 8.4 - 10.2 mg/dL    eGFR 40 ml/min/1.73sq m   Magnesium    Collection Time: 02/01/25  4:31 AM   Result Value Ref Range    Magnesium 2.1 1.9 - 2.7 mg/dL   TSH, 3rd generation with Free T4 reflex    Collection Time: 02/01/25  4:31 AM   Result Value Ref Range    TSH 3RD GENERATON 0.997 0.450 - 4.500 uIU/mL   Fingerstick Glucose (POCT)    Collection Time: 02/01/25  7:42 AM   Result Value Ref Range    POC Glucose 83 65 - 140 mg/dl   D-dimer, quantitative    Collection Time: 02/01/25 10:27 AM   Result Value Ref Range    D-Dimer, Quant 0.64 (H) <0.50 ug/ml FEU   Fingerstick Glucose (POCT)    Collection Time: 02/01/25 11:44 AM   Result Value Ref Range    POC Glucose 137 65 - 140 mg/dl   Fingerstick Glucose (POCT)    Collection Time: 02/01/25  4:38 PM   Result Value Ref Range    POC Glucose 95 65 - 140 mg/dl   Fingerstick Glucose (POCT)    Collection Time: 02/01/25  8:21 PM   Result Value Ref Range    POC Glucose 123 65 - 140 mg/dl   CBC and differential    Collection Time: 02/02/25  6:23 AM   Result Value Ref Range    WBC 6.31 4.31 - 10.16 Thousand/uL    RBC 4.28 3.81 - 5.12 Million/uL    Hemoglobin 12.2 11.5 - 15.4 g/dL    Hematocrit 37.3 34.8 - 46.1 %    MCV 87 82 - 98 fL    MCH 28.5 26.8 - 34.3 pg    MCHC 32.7 31.4 - 37.4 g/dL    RDW 14.3 11.6 - 15.1 %    MPV 10.9 8.9 - 12.7 fL     Platelets 148 (L) 149 - 390 Thousands/uL    nRBC 0 /100 WBCs    Segmented % 74 43 - 75 %    Immature Grans % 0 0 - 2 %    Lymphocytes % 14 14 - 44 %    Monocytes % 11 4 - 12 %    Eosinophils Relative 1 0 - 6 %    Basophils Relative 0 0 - 1 %    Absolute Neutrophils 4.63 1.85 - 7.62 Thousands/µL    Absolute Immature Grans 0.01 0.00 - 0.20 Thousand/uL    Absolute Lymphocytes 0.89 0.60 - 4.47 Thousands/µL    Absolute Monocytes 0.68 0.17 - 1.22 Thousand/µL    Eosinophils Absolute 0.08 0.00 - 0.61 Thousand/µL    Basophils Absolute 0.02 0.00 - 0.10 Thousands/µL   Basic metabolic panel    Collection Time: 02/02/25  6:23 AM   Result Value Ref Range    Sodium 141 135 - 147 mmol/L    Potassium 3.8 3.5 - 5.3 mmol/L    Chloride 104 96 - 108 mmol/L    CO2 23 21 - 32 mmol/L    ANION GAP 14 (H) 4 - 13 mmol/L    BUN 34 (H) 5 - 25 mg/dL    Creatinine 1.27 0.60 - 1.30 mg/dL    Glucose 81 65 - 140 mg/dL    Calcium 8.5 8.4 - 10.2 mg/dL    eGFR 38 ml/min/1.73sq m   Magnesium    Collection Time: 02/02/25  6:23 AM   Result Value Ref Range    Magnesium 2.1 1.9 - 2.7 mg/dL   Fingerstick Glucose (POCT)    Collection Time: 02/02/25  7:50 AM   Result Value Ref Range    POC Glucose 116 65 - 140 mg/dl   Fingerstick Glucose (POCT)    Collection Time: 02/02/25 12:20 PM   Result Value Ref Range    POC Glucose 137 65 - 140 mg/dl   Fingerstick Glucose (POCT)    Collection Time: 02/02/25  4:40 PM   Result Value Ref Range    POC Glucose 104 65 - 140 mg/dl   Fingerstick Glucose (POCT)    Collection Time: 02/02/25  9:46 PM   Result Value Ref Range    POC Glucose 113 65 - 140 mg/dl   CBC and differential    Collection Time: 02/03/25  5:45 AM   Result Value Ref Range    WBC 7.97 4.31 - 10.16 Thousand/uL    RBC 4.35 3.81 - 5.12 Million/uL    Hemoglobin 12.2 11.5 - 15.4 g/dL    Hematocrit 37.9 34.8 - 46.1 %    MCV 87 82 - 98 fL    MCH 28.0 26.8 - 34.3 pg    MCHC 32.2 31.4 - 37.4 g/dL    RDW 14.4 11.6 - 15.1 %    MPV 10.9 8.9 - 12.7 fL    Platelets 152 149 -  390 Thousands/uL    nRBC 0 /100 WBCs    Segmented % 76 (H) 43 - 75 %    Immature Grans % 0 0 - 2 %    Lymphocytes % 11 (L) 14 - 44 %    Monocytes % 11 4 - 12 %    Eosinophils Relative 2 0 - 6 %    Basophils Relative 0 0 - 1 %    Absolute Neutrophils 6.03 1.85 - 7.62 Thousands/µL    Absolute Immature Grans 0.03 0.00 - 0.20 Thousand/uL    Absolute Lymphocytes 0.91 0.60 - 4.47 Thousands/µL    Absolute Monocytes 0.85 0.17 - 1.22 Thousand/µL    Eosinophils Absolute 0.12 0.00 - 0.61 Thousand/µL    Basophils Absolute 0.03 0.00 - 0.10 Thousands/µL   Basic metabolic panel    Collection Time: 02/03/25  5:45 AM   Result Value Ref Range    Sodium 139 135 - 147 mmol/L    Potassium 3.7 3.5 - 5.3 mmol/L    Chloride 102 96 - 108 mmol/L    CO2 23 21 - 32 mmol/L    ANION GAP 14 (H) 4 - 13 mmol/L    BUN 31 (H) 5 - 25 mg/dL    Creatinine 1.07 0.60 - 1.30 mg/dL    Glucose 92 65 - 140 mg/dL    Calcium 8.9 8.4 - 10.2 mg/dL    eGFR 47 ml/min/1.73sq m   Magnesium    Collection Time: 02/03/25  5:45 AM   Result Value Ref Range    Magnesium 1.9 1.9 - 2.7 mg/dL   Fingerstick Glucose (POCT)    Collection Time: 02/03/25  7:34 AM   Result Value Ref Range    POC Glucose 111 65 - 140 mg/dl   Stress strip    Collection Time: 02/03/25 10:26 AM   Result Value Ref Range    Protocol Name DAILY SIT     Exercise duration (min) 3 min    Exercise duration (sec) 0 sec    Post Peak Systolic  mmHg    Max Diastolic Bp 57 mmHg    Peak  BPM    Max Predicted Heart Rate 135 BPM    Reason for Termination Protocol Complete     Test Indication Chest Discomfort, SOB     Target Hr Formular (220 - Age)*100%     Arrhy During Ex      ECG Interp Before Ex      ECG Interp during Ex      Ex Summary Comment      Chest Pain Statement none     Overall Hr Response To Exercise      Overall BP Response To Exercise     ECG 12 lead    Collection Time: 02/03/25 10:28 AM   Result Value Ref Range    Ventricular Rate 95 BPM    Atrial Rate 375 BPM    WI Interval  ms    QRSD  Interval 118 ms    QT Interval 400 ms    QTC Interval 502 ms    P Axis  degrees    QRS Axis 106 degrees    T Wave Axis -17 degrees   Fingerstick Glucose (POCT)    Collection Time: 02/03/25 11:43 AM   Result Value Ref Range    POC Glucose 169 (H) 65 - 140 mg/dl   NM Myocardial Perfusion Spect (Pharmacological Induced Stress and/or Rest)    Collection Time: 02/03/25 11:59 AM   Result Value Ref Range    Rest Nuclear Isotope Dose 11.00 mCi    Stress Nuclear Isotope Dose 32.80 mCi    Baseline HR 93 bpm    Baseline /59 mmHg    O2 sat rest 96 %    Stress peak  bpm    Post peak  mmHg    O2 sat peak 96 %    Recovery  bpm    Recovery /57 mmHg    O2 sat recovery 97 %    Max  bpm    Max HR Percent 94 %    Exercise duration (min) 1 min    Estimated workload 1.0 METS    Rate Pressure Product 12,584.0     Angina Index 0     Stress/rest perfusion ratio 0.94     EF (%) 73 %   ECG 12 lead    Collection Time: 02/03/25  1:27 PM   Result Value Ref Range    Ventricular Rate 104 BPM    Atrial Rate  BPM    VT Interval  ms    QRSD Interval 124 ms    QT Interval 350 ms    QTC Interval 461 ms    P Axis  degrees    QRS Axis 103 degrees    T Wave Axis -43 degrees   Fingerstick Glucose (POCT)    Collection Time: 02/03/25  3:58 PM   Result Value Ref Range    POC Glucose 72 65 - 140 mg/dl   Fingerstick Glucose (POCT)    Collection Time: 02/03/25  4:39 PM   Result Value Ref Range    POC Glucose 119 65 - 140 mg/dl   Fingerstick Glucose (POCT)    Collection Time: 02/03/25 10:50 PM   Result Value Ref Range    POC Glucose 94 65 - 140 mg/dl   Basic metabolic panel    Collection Time: 02/04/25  5:44 AM   Result Value Ref Range    Sodium 141 135 - 147 mmol/L    Potassium 3.9 3.5 - 5.3 mmol/L    Chloride 103 96 - 108 mmol/L    CO2 25 21 - 32 mmol/L    ANION GAP 13 4 - 13 mmol/L    BUN 28 (H) 5 - 25 mg/dL    Creatinine 1.18 0.60 - 1.30 mg/dL    Glucose 97 65 - 140 mg/dL    Calcium 9.2 8.4 - 10.2 mg/dL    eGFR 42  "ml/min/1.73sq m   Fingerstick Glucose (POCT)    Collection Time: 02/04/25  7:35 AM   Result Value Ref Range    POC Glucose 103 65 - 140 mg/dl   Fingerstick Glucose (POCT)    Collection Time: 02/04/25 11:42 AM   Result Value Ref Range    POC Glucose 123 65 - 140 mg/dl             Dr. Eveline Henriquez MD, [facility]      \"This note has been constructed using a voice recognition system.Therefore there may be syntax, spelling, and/or grammatical errors. Please call if you have any questions. \"  "

## 2025-02-05 NOTE — NURSING NOTE
Discharge instructions reviewed with patient and daughter.  They voiced understanding of the information.

## 2025-02-06 ENCOUNTER — PATIENT OUTREACH (OUTPATIENT)
Dept: CASE MANAGEMENT | Facility: OTHER | Age: 85
End: 2025-02-06

## 2025-02-06 ENCOUNTER — TELEPHONE (OUTPATIENT)
Dept: CARDIOLOGY CLINIC | Facility: CLINIC | Age: 85
End: 2025-02-06

## 2025-02-06 ENCOUNTER — TRANSITIONAL CARE MANAGEMENT (OUTPATIENT)
Dept: FAMILY MEDICINE CLINIC | Facility: CLINIC | Age: 85
End: 2025-02-06

## 2025-02-06 ENCOUNTER — OFFICE VISIT (OUTPATIENT)
Dept: CARDIOLOGY CLINIC | Facility: CLINIC | Age: 85
End: 2025-02-06
Payer: MEDICARE

## 2025-02-06 VITALS
HEART RATE: 85 BPM | WEIGHT: 145 LBS | HEIGHT: 60 IN | OXYGEN SATURATION: 99 % | SYSTOLIC BLOOD PRESSURE: 90 MMHG | DIASTOLIC BLOOD PRESSURE: 60 MMHG | BODY MASS INDEX: 28.47 KG/M2

## 2025-02-06 DIAGNOSIS — I48.91 ATRIAL FIBRILLATION WITH RVR (HCC): Primary | ICD-10-CM

## 2025-02-06 DIAGNOSIS — I50.32 CHRONIC DIASTOLIC (CONGESTIVE) HEART FAILURE (HCC): ICD-10-CM

## 2025-02-06 DIAGNOSIS — I50.33 ACUTE ON CHRONIC DIASTOLIC CONGESTIVE HEART FAILURE (HCC): ICD-10-CM

## 2025-02-06 DIAGNOSIS — I13.0 HYPERTENSIVE HEART AND CHRONIC KIDNEY DISEASE WITH HEART FAILURE AND STAGE 1 THROUGH STAGE 4 CHRONIC KIDNEY DISEASE, OR CHRONIC KIDNEY DISEASE (HCC): ICD-10-CM

## 2025-02-06 DIAGNOSIS — I48.19 PERSISTENT ATRIAL FIBRILLATION (HCC): ICD-10-CM

## 2025-02-06 DIAGNOSIS — I50.33 ACUTE ON CHRONIC DIASTOLIC HEART FAILURE (HCC): Primary | ICD-10-CM

## 2025-02-06 DIAGNOSIS — E78.5 HYPERLIPIDEMIA, UNSPECIFIED HYPERLIPIDEMIA TYPE: ICD-10-CM

## 2025-02-06 DIAGNOSIS — I10 BENIGN ESSENTIAL HYPERTENSION: ICD-10-CM

## 2025-02-06 PROCEDURE — 93000 ELECTROCARDIOGRAM COMPLETE: CPT | Performed by: INTERNAL MEDICINE

## 2025-02-06 PROCEDURE — 99214 OFFICE O/P EST MOD 30 MIN: CPT | Performed by: INTERNAL MEDICINE

## 2025-02-06 RX ORDER — LOSARTAN POTASSIUM 25 MG/1
50 TABLET ORAL DAILY
Qty: 60 TABLET | Refills: 2 | Status: SHIPPED | OUTPATIENT
Start: 2025-02-06

## 2025-02-06 NOTE — ASSESSMENT & PLAN NOTE
Wt Readings from Last 3 Encounters:   02/06/25 65.8 kg (145 lb)   02/04/25 62.1 kg (137 lb)   01/30/25 68.9 kg (152 lb)

## 2025-02-06 NOTE — PROGRESS NOTES
CMOC received referral from  to assist patient in HF Education home visit. CMOC sent RNCM Zita Ca an IB to be added to care plan tasks. RNCM sent an IB confirming addition to care plan.     CMOC called patient and left a VM introducing self, role, reason for call, and detailed information for a return call.     CMOC awaiting response    Next Outreach 2/7

## 2025-02-06 NOTE — PROGRESS NOTES
Outpatient Care Management Note:    Care manager called Rosalind and introduced myself. She states that her main concern is ongoing pain in her right hand. She states it started in the hospital and has continued. She  noted that the hospital was aware of the pain but did not resolve the problem. CM reviewed that she needs to follow up with her PCP. I instructed her to call now for an appt.     Following low sodium diet: We reviewed limiting her salt intake to <2000 mg/day.  Rosalind states that her daughter in law does the cooking and has been limiting her salt. Her daughter in law was not available to talk with today.  CM will attempt to review reading food labels and foods high in salt on next outreach.   Following fluid restriction: We also discussed limiting her fluid intake to <64 ounces per day. We discussed using a measuring cup to determine how many ounces are in each drinking vessel she uses.   Hospital discharge weight:  137 lbs  Weighing daily:  yes            Weight log: no, encouraged her to start keeping a log.   1st home weight:  she could not recall first reading.  Again encouraged a weight log.     Weight today: 144 lbs.   Monitoring symptoms: She denies any shortness of breath, swelling, difficulty laying flat or decreased exercise tolerance.   Any current symptoms: None  When to call provider: We reviewed that she should call cardiology if she would gain 3 lb in 1 day or 5 lb in 1 week or if she experiences any of above heart failure symptoms.   Medications reviewed:  Yes.  She is taking her losarten 50 mg in the morning and 25 mg in evening. It is ordered 50 mg daily.   Knows name of diuretic:  yes  Escalation plan: Reviewed when to call cardiology  HF education reviewed/reinforced including low sodium diet, fluid restriction, activity, symptoms of decompensation and when and who to call.   Cardiology follow up appointment: 2/6  PCP follow up appointment: will call today to  schedule  Transportation: daughter in law transports to all [facility]  Home health care agency: NA  Start of Care Date: NA       Rosalind's daughter in law, Kateryna, called back.  CM reviewed all of above with her. She will encourage Rosalind to start keeping a weight log.     Kateryna missed the losarten med dose change. She found the AVS and noted the change. She will discuss with cardiology at today's appt.     We reviewed all diet and fluid restriction information. Kateryna is happy to have CMOC visit for more information.     Kateryna has my contact information.  She knows that my phone goes through my computer and I do not get messages after hours or on weekends. She is aware to call her PCP office directly with any immediate questions.

## 2025-02-07 ENCOUNTER — PATIENT OUTREACH (OUTPATIENT)
Dept: CASE MANAGEMENT | Facility: OTHER | Age: 85
End: 2025-02-07

## 2025-02-07 NOTE — PROGRESS NOTES
CMOC placed call to patient to make a home visit for HF.     Patient advised daughter in law isn't home. She feels tired like she is overworked but her hand pain has gone away.     CMOC asked patient to have daughter in law return call.     ADDENDUM: Patients daughter in law, Kateryna called CMOC back. Home visit is scheduled for 2/10 at 10:00 am     Next Outreach 2/10

## 2025-02-10 ENCOUNTER — PATIENT OUTREACH (OUTPATIENT)
Dept: CASE MANAGEMENT | Facility: OTHER | Age: 85
End: 2025-02-10

## 2025-02-10 NOTE — PROGRESS NOTES
"*Completed Home Visit Covid Screening: If no, explain: yes      HANDOUTS    \"Important activities I can do at home to manage my heart failure\" provided and reviewed with the patient: Yes   Does the patient have their AVS and CHF booklet?  Yes   Does the patient have access to watch the Heart Failure video?  Yes     SIGNS AND SYMPTOMS    Is the patient having any of the following symptoms:   Swelling \"edema\" in legs, feet, ankles, hands, or abdomen/stomach: No     Clothing, shoes, or jewelry tighter: No    Increase in weight (3 lbs overnight or 5 lbs in a week): No   Increase in weakness: Np   Increase in tiredness: Yes    New cough or unusual cough: No    Dizziness: Yes/ but this normal for patient    Harder to breath lying down or need to sleep in a chair or require more pillows to sleep: No    Chest pain: No   CMOC reported symptoms to the RN care manager or the provider's office clinical staff: N/A    FLUIDS    Fluid restrictions reviewed: Yes   Patient knows their fluid restrictions:  Yes   Fluid restrictions are: 64 OZ  How does the patient keep track of their fluid intake? measure  Patient demonstrated how to measure/track their fluids for the day: Yes     DIET    Who does the food shopping and where do they get their food? Daughter in law  Do they need any food resources? No    If yes, was FindHelp used or a  needed? No  Who does the cooking? Daughter In Law  Sodium restrictions reviewed: Yes   Patient knows their sodium restrictions: Yes   Sodium restrictions are: 2000mg  Reviewed food label reading: Yes   Patient could identify foods that are high in salt in their pantry/refrigerator/freezer: Yes     REVIEW HOW TO CHECK WEIGHT    Do they have a working scale? Yes   Is the scale on a flat, hard surface? No   Reviewed to weigh themselves in the morning after getting up and using the bathroom, before eating and drinking, and to wear the same amount of clothing? Yes   Does the patient keep a log of " weights with the date & time? Yes   Reviewed with patient to notify Cardiology office/PCP office with increase in weight of 3 lbs overnight or 5 lbs in a week. Yes   Patient was able to demonstrate how to weigh themselves independently: Yes     MEDICATION    Patient has their medication list: Yes   Did the patient fill their medications after discharge? Yes   How does the patient manage their medication? Daughter in law  CMOC was able to review medications with RN care manager: Yes     APPOINTMENTS    Do they have a PCP appointment? Yes, describe: next week   Do they have a cardiology appointment? Yes, describe: already has  Do you have any upcoming lab work, testing, etc...? No  How do you get to your appointments, testing, labs, etc...? Already had it done    If assistance is needed in getting lab work, did you refer to mobile labs? No

## 2025-02-10 NOTE — PROGRESS NOTES
Outpatient Care Management Note:    Care manager called our CMOC, Jeni Rosenbaum, while she was completing a home visit with Rosalind and her daughter, Kateryna.      We completed a med review.  Rosalind is also taking lotrisone cream twice a day as prescribed by her podiatrist.     Rosalind is now checking daily weights. Today, she weighed 144 lbs. She was the same weight yesterday. We reviewed calling cardiology if she would gain 3 lb in 1 day or 5 lb in 1 week.  Rosalind denies any swelling or shortness of breath. She is wearing compression stockings. We reviewed that they should be applied each morning and removed each night.     We again reviewed measuring fluid and salt restrictions. Kateryna does not feel she is drinking >64 ounces per day. They are working to limit her salt intake to <2000mg/day.     CM will follow up next week.

## 2025-02-12 DIAGNOSIS — I48.0 PAROXYSMAL ATRIAL FIBRILLATION (HCC): ICD-10-CM

## 2025-02-12 DIAGNOSIS — R06.02 EXERTIONAL SHORTNESS OF BREATH: ICD-10-CM

## 2025-02-12 DIAGNOSIS — I50.32 CHRONIC DIASTOLIC (CONGESTIVE) HEART FAILURE (HCC): ICD-10-CM

## 2025-02-13 ENCOUNTER — OFFICE VISIT (OUTPATIENT)
Dept: FAMILY MEDICINE CLINIC | Facility: CLINIC | Age: 85
End: 2025-02-13
Payer: MEDICARE

## 2025-02-13 VITALS
BODY MASS INDEX: 28.66 KG/M2 | HEART RATE: 104 BPM | SYSTOLIC BLOOD PRESSURE: 110 MMHG | OXYGEN SATURATION: 100 % | HEIGHT: 60 IN | DIASTOLIC BLOOD PRESSURE: 72 MMHG | WEIGHT: 146 LBS

## 2025-02-13 DIAGNOSIS — I13.0 HYPERTENSIVE HEART AND CHRONIC KIDNEY DISEASE WITH HEART FAILURE AND STAGE 1 THROUGH STAGE 4 CHRONIC KIDNEY DISEASE, OR CHRONIC KIDNEY DISEASE (HCC): Primary | ICD-10-CM

## 2025-02-13 DIAGNOSIS — N18.30 STAGE 3 CHRONIC KIDNEY DISEASE, UNSPECIFIED WHETHER STAGE 3A OR 3B CKD (HCC): Chronic | ICD-10-CM

## 2025-02-13 DIAGNOSIS — I50.33 ACUTE ON CHRONIC DIASTOLIC CONGESTIVE HEART FAILURE (HCC): ICD-10-CM

## 2025-02-13 DIAGNOSIS — E79.0 HYPERURICEMIA: ICD-10-CM

## 2025-02-13 DIAGNOSIS — E03.9 HYPOTHYROIDISM, UNSPECIFIED TYPE: Chronic | ICD-10-CM

## 2025-02-13 DIAGNOSIS — I10 BENIGN ESSENTIAL HYPERTENSION: ICD-10-CM

## 2025-02-13 DIAGNOSIS — M1A.0720 IDIOPATHIC CHRONIC GOUT OF LEFT ANKLE WITHOUT TOPHUS: ICD-10-CM

## 2025-02-13 DIAGNOSIS — I48.19 PERSISTENT ATRIAL FIBRILLATION (HCC): ICD-10-CM

## 2025-02-13 DIAGNOSIS — I50.32 CHRONIC DIASTOLIC (CONGESTIVE) HEART FAILURE (HCC): ICD-10-CM

## 2025-02-13 DIAGNOSIS — R73.03 PREDIABETES: ICD-10-CM

## 2025-02-13 DIAGNOSIS — I70.209 PERIPHERAL ARTERIOSCLEROSIS (HCC): ICD-10-CM

## 2025-02-13 PROBLEM — T50.902A INTENTIONAL OVERDOSE (HCC): Status: RESOLVED | Noted: 2024-03-22 | Resolved: 2025-02-13

## 2025-02-13 PROBLEM — R06.02 SOB (SHORTNESS OF BREATH) ON EXERTION: Status: RESOLVED | Noted: 2018-10-26 | Resolved: 2025-02-13

## 2025-02-13 PROBLEM — F32.A DEPRESSION: Status: RESOLVED | Noted: 2024-03-27 | Resolved: 2025-02-13

## 2025-02-13 PROBLEM — I48.91 ATRIAL FIBRILLATION WITH RVR (HCC): Status: RESOLVED | Noted: 2024-03-22 | Resolved: 2025-02-13

## 2025-02-13 PROCEDURE — 99495 TRANSJ CARE MGMT MOD F2F 14D: CPT | Performed by: FAMILY MEDICINE

## 2025-02-13 NOTE — ASSESSMENT & PLAN NOTE
No recent gouty flares.  She is no longer on any agents.  She is not on allopurinol.  Allopurinol would not be recommended given her chronic kidney disease diagnosis.  If she needed to be on something it would be Uloric

## 2025-02-13 NOTE — ASSESSMENT & PLAN NOTE
Check repeat uric acid.  No recent gouty flares    -If she does develop gouty flares then she will need to be placed on Uloric due to chronic kidney disease

## 2025-02-13 NOTE — ASSESSMENT & PLAN NOTE
On beta-blocker and losartan.  Seems to be adequately controlled.  Follow-up with cardiology as scheduled

## 2025-02-13 NOTE — ASSESSMENT & PLAN NOTE
Remains on metoprolol and Eliquis for anticoagulation.  These are prescribed by cardiology.  Follow-up with cardiology as scheduled

## 2025-02-13 NOTE — PROGRESS NOTES
"  Subjective:      Patient ID: Rosalind Wise is a 84 y.o. female.    TCM Call     Date and time call was made  2/6/2025  3:17 PM    Hospital care reviewed  Records reviewed    Patient was hospitialized at  [facility]    Date of Admission  01/31/25    Date of discharge  02/04/25    Diagnosis  CHF    Disposition  Home    Were the patients medications reviewed and updated  Yes    Current Symptoms  None      TCM Call     Post hospital issues  None    Should patient be enrolled in anticoag monitoring?  No    Scheduled for follow up?  Yes    Patient refusal reason  pt not returning call    Patients specialists  Cardiologist    Cardiologist name  KANDY CHAMBERS    Nephrologist name  ALIA ESPITIA     Neurologist name  NEUROSURGERY      Other specialists names  PT    Did you obtain your prescribed medications  Yes    Do you need help managing your prescriptions or medications  Yes    Daughter in law assists with medication    Why type of assitance do you need  family assistance    Is transportation to your appointment needed  No    I have advised the patient to call PCP with any new or worsening symptoms    Nicolasa Al MA    Living Arrangements  Family members    Support System  Family    The type of support provided  Emotional; Financial; Physical    Do you have social support  Yes, as much as I need    Are you recieving any outpatient services  No    What type of services  PT AND OT    Are you recieving home care services  No    Are you using any community resources  No    Current waiver services  No    Have you fallen in the last 12 months  No    Interperter language line needed  No    Counseling  Family       Hospitalization at [facility] for acute congestive heart failure.  Presents with her daughter for TCM visit.  Below is discharge summary as per Dr. Falcon:    \"Hospital Course:   Rosalind Wise is a 84 y.o. female patient who originally presented to the hospital on 1/31/2025 due to " "short of breath with exertion.  Chest x-ray with evidence of pulmonary edema.  Patient admitted for acute on chronic CHF exacerbation.  Cardiology has been consulted.  Patient also complaining of left-sided chest pain that radiates down the arm.  Patient was treated with IV Lasix medication with improvement in breathing.  Patient underwent nuclear medicine stress test which was negative for acute ischemia.  Patient's cardiac and respiratory symptoms resolved.  See cardiac medication adjustments as above per our cardiology service.  Patient under no acute distress on day of discharge.  Patient has close follow-up with cardiology in the office after discharge.  All patient and family questions answered to the best of my ability.\"    New patient for me.  Prior PCP left the network.  Feeling well.  They are monitoring daily weights as well as sodium intake.  No shortness of breath.  Does follow-up with cardiology, nephrology.  No particular complaints.  Was feeling short of breath at that time.  Soft call for CHF.  Left ventricular ejection fraction on echocardiogram at 73%.  BNP was at 445 which is technically positive        Past Medical History:   Diagnosis Date   • Anxiety    • Arthritis     joints   • Bunion    • Cataract    • Disease of thyroid gland    • Eczema    • GERD (gastroesophageal reflux disease)    • Gout    • Heart failure (HCC)    • Hepatitis    • Hiatal hernia    • Hypertension    • Intentional overdose (HCC) 03/22/2024   • Nephrolithiasis 06/21/2017   • Onychomycosis     last assessed: 4/29/16   • Orthopnea     resolved: 1/8/16   • Pseudogout of left wrist    • Sleep apnea     wears CPAP   • SOB (shortness of breath) on exertion 10/26/2018   • Stroke (HCC)        Family History   Problem Relation Age of Onset   • Diabetes Mother    • Coronary artery disease Mother    • Arthritis Mother    • Hypertension Mother    • Hypertension Father    • Diabetes Brother    • Diabetes Son    • Arthritis Family  "       Past Surgical History:   Procedure Laterality Date   • ABDOMINAL SURGERY         • BRAIN HEMATOMA EVACUATION Right 2020    Procedure: Right decompressive craniectomy and evacuation of intraparenchymal hematoma;  Surgeon: Apolinar Herrera MD;  Location: [facility];  Service: Neurosurgery   • BREAST SURGERY Right     cyst removal   • CARPAL TUNNEL RELEASE Left    • CARPAL TUNNEL RELEASE Right    • CATARACT EXTRACTION     •  SECTION  1960    x1   • COLONOSCOPY N/A 2018    Procedure: COLONOSCOPY;  Surgeon: Alejandro Carlson MD;  Location: [facility] GI LAB;  Service: Gastroenterology   • DILATION AND CURETTAGE OF UTERUS     • EYE SURGERY Left     cataracts   • HERNIA REPAIR      umbilical hernia   • INCISIONAL HERNIA REPAIR      incarcerated   • KNEE ARTHROSCOPY Right    • PA RPLCMT BONE FLAP/PROSTHETIC PLATE SKULL Right 2020    Procedure: right frontotemporal replacement cranioplasty;  Surgeon: Apolinar Herrera MD;  Location:  MAIN OR;  Service: Neurosurgery   • PA XCAPSL CTRC RMVL INSJ IO LENS PROSTH W/O ECP Right 3/27/2017    Procedure: EXTRACTION EXTRACAPSULAR CATARACT PHACO INTRAOCULAR LENS (IOL);  Surgeon: Trip Gilbert MD;  Location: [facility] MAIN OR;  Service: Ophthalmology        reports that she has never smoked. She has never used smokeless tobacco. She reports current alcohol use. She reports that she does not use drugs.      Current Outpatient Medications:   •  apixaban (Eliquis) 2.5 mg, TAKE 1 TABLET TWICE DAILY, Disp: 180 tablet, Rfl: 1  •  atorvastatin (LIPITOR) 40 mg tablet, Take 1 tablet (40 mg total) by mouth every evening, Disp: 90 tablet, Rfl: 1  •  bumetanide (BUMEX) 1 mg tablet, Take 1 tablet (1 mg total) by mouth daily, Disp: 90 tablet, Rfl: 3  •  Cholecalciferol (Vitamin D3) 50 MCG CAPS, Take by mouth in the morning, Disp: , Rfl:   •  Diclofenac Sodium (VOLTAREN) 1 %, Apply 2 g topically in the morning APPLY NO MORE THAN 3 DAYS IN A ROW THEN TAKE A  BREAK FOR ONE WEEK FROM USE., Disp: 100 g, Rfl: 1  •  Empagliflozin (Jardiance) 10 MG TABS tablet, Take 1 tablet (10 mg total) by mouth every morning, Disp: 90 tablet, Rfl: 1  •  hydrOXYzine HCL (ATARAX) 10 mg tablet, Take 1 tablet (10 mg total) by mouth daily at bedtime as needed (insomnia), Disp: 90 tablet, Rfl: 1  •  levothyroxine 88 mcg tablet, Take 1 tablet (88 mcg total) by mouth daily, Disp: 90 tablet, Rfl: 1  •  losartan (COZAAR) 25 mg tablet, Take 2 tablets (50 mg total) by mouth daily, Disp: 60 tablet, Rfl: 2  •  Magnesium 400 MG CAPS, Take by mouth in the morning Take 500 mg. BID, Disp: , Rfl:   •  metoprolol tartrate (LOPRESSOR) 25 mg tablet, Take 0.5 tablets (12.5 mg total) by mouth every 12 (twelve) hours, Disp: 90 tablet, Rfl: 1  •  pantoprazole (PROTONIX) 20 mg tablet, Take 1 tablet (20 mg total) by mouth daily, Disp: 90 tablet, Rfl: 1  •  spironolactone (ALDACTONE) 25 mg tablet, TAKE 1/2 TABLET EVERY DAY, Disp: 45 tablet, Rfl: 1  •  clotrimazole-betamethasone (LOTRISONE) 1-0.05 % cream, Apply topically 2 (two) times a day for 25 days, Disp: 45 g, Rfl: 1    Current Facility-Administered Medications:   •  triamcinolone acetonide (KENALOG-40) 40 mg/mL injection 20 mg, 20 mg, Infiltration, , , 20 mg at 10/03/24 1045  •  triamcinolone acetonide (Kenalog-40) 40 mg/mL injection 20 mg, 20 mg, Intra-articular, , , 20 mg at 12/12/24 1115    The following portions of the patient's history were reviewed and updated as appropriate: allergies, current medications, past family history, past medical history, past social history, past surgical history and problem list.    Review of Systems   Constitutional: Negative.    HENT: Negative.     Eyes: Negative.    Respiratory: Negative.     Cardiovascular: Negative.    Gastrointestinal: Negative.    Endocrine: Negative.    Genitourinary: Negative.    Musculoskeletal:  Positive for arthralgias and gait problem (Ambulates with the assistance of a walker).   Skin: Negative.     Allergic/Immunologic: Negative.    Hematological: Negative.    Psychiatric/Behavioral: Negative.             Objective:    /72   Pulse 104   Ht 5' (1.524 m)   Wt 66.2 kg (146 lb)   SpO2 100%   BMI 28.51 kg/m²      Physical Exam  Vitals and nursing note reviewed.   Constitutional:       General: She is not in acute distress.     Appearance: She is well-developed. She is not diaphoretic.   HENT:      Head: Normocephalic and atraumatic.      Nose: Nose normal.   Eyes:      Conjunctiva/sclera: Conjunctivae normal.      Pupils: Pupils are equal, round, and reactive to light.   Cardiovascular:      Rate and Rhythm: Normal rate. Rhythm irregular.      Heart sounds: Normal heart sounds. No murmur heard.  Pulmonary:      Effort: Pulmonary effort is normal.      Breath sounds: Normal breath sounds. No rales.   Abdominal:      General: Bowel sounds are normal.      Palpations: Abdomen is soft.   Musculoskeletal:         General: Normal range of motion.      Cervical back: Normal range of motion and neck supple.      Right lower leg: No edema.      Left lower leg: No edema.   Skin:     General: Skin is warm and dry.      Findings: No rash.   Neurological:      Mental Status: She is alert and oriented to person, place, and time.      Deep Tendon Reflexes: Reflexes are normal and symmetric.   Psychiatric:         Behavior: Behavior normal.         Thought Content: Thought content normal.         Judgment: Judgment normal.           Recent Results (from the past 6 weeks)   Comprehensive metabolic panel    Collection Time: 01/03/25  8:13 AM   Result Value Ref Range    Sodium 144 135 - 147 mmol/L    Potassium 4.2 3.5 - 5.3 mmol/L    Chloride 104 96 - 108 mmol/L    CO2 31 21 - 32 mmol/L    ANION GAP 9 4 - 13 mmol/L    BUN 31 (H) 5 - 25 mg/dL    Creatinine 1.17 0.60 - 1.30 mg/dL    Glucose, Fasting 82 65 - 99 mg/dL    Calcium 9.5 8.4 - 10.2 mg/dL    AST 16 13 - 39 U/L    ALT 11 7 - 52 U/L    Alkaline Phosphatase 84 34 -  104 U/L    Total Protein 6.7 6.4 - 8.4 g/dL    Albumin 4.0 3.5 - 5.0 g/dL    Total Bilirubin 0.90 0.20 - 1.00 mg/dL    eGFR 42 ml/min/1.73sq m   Hemoglobin A1C    Collection Time: 01/03/25  8:13 AM   Result Value Ref Range    Hemoglobin A1C 5.8 (H) Normal 4.0-5.6%; PreDiabetic 5.7-6.4%; Diabetic >=6.5%; Glycemic control for adults with diabetes <7.0% %     mg/dl   Lipid panel    Collection Time: 01/03/25  8:13 AM   Result Value Ref Range    Cholesterol 115 See Comment mg/dL    Triglycerides 72 See Comment mg/dL    HDL, Direct 56 >=50 mg/dL    LDL Calculated 45 0 - 100 mg/dL    Non-HDL-Chol (CHOL-HDL) 59 mg/dl   TSH, 3rd generation with Free T4 reflex    Collection Time: 01/03/25  8:13 AM   Result Value Ref Range    TSH 3RD GENERATON 1.395 0.450 - 4.500 uIU/mL   Magnesium    Collection Time: 01/03/25  8:13 AM   Result Value Ref Range    Magnesium 2.1 1.9 - 2.7 mg/dL   Phosphorus    Collection Time: 01/03/25  8:13 AM   Result Value Ref Range    Phosphorus 3.2 2.3 - 4.1 mg/dL   PTH, intact    Collection Time: 01/03/25  8:13 AM   Result Value Ref Range    PTH 76.0 12.0 - 88.0 pg/mL   Albumin / creatinine urine ratio    Collection Time: 01/03/25  8:13 AM   Result Value Ref Range    Creatinine, Ur 136.7 Reference range not established. mg/dL    Albumin,U,Random 9.1 <20.0 mg/L    Albumin Creat Ratio 7 0 - 30 mg/g creatinine   CBC    Collection Time: 01/03/25  8:13 AM   Result Value Ref Range    WBC 5.02 4.31 - 10.16 Thousand/uL    RBC 4.30 3.81 - 5.12 Million/uL    Hemoglobin 12.1 11.5 - 15.4 g/dL    Hematocrit 38.5 34.8 - 46.1 %    MCV 90 82 - 98 fL    MCH 28.1 26.8 - 34.3 pg    MCHC 31.4 31.4 - 37.4 g/dL    RDW 15.1 11.6 - 15.1 %    Platelets 164 149 - 390 Thousands/uL    MPV 11.1 8.9 - 12.7 fL   Urinalysis with microscopic    Collection Time: 01/03/25  8:13 AM   Result Value Ref Range    Color, UA Light Yellow     Clarity, UA Clear     Specific Gravity, UA 1.020 1.003 - 1.030    pH, UA 6.0 4.5, 5.0, 5.5, 6.0,  6.5, 7.0, 7.5, 8.0    Leukocytes, UA Trace (A) Negative    Nitrite, UA Negative Negative    Protein, UA Trace (A) Negative mg/dl    Glucose, UA Negative Negative mg/dl    Ketones, UA Negative Negative mg/dl    Urobilinogen, UA <2.0 <2.0 mg/dl mg/dl    Bilirubin, UA Negative Negative    Occult Blood, UA Trace (A) Negative    RBC, UA 1-2 None Seen, 1-2 /hpf    WBC, UA 4-10 (A) None Seen, 1-2 /hpf    Epithelial Cells Occasional None Seen, Occasional /hpf    Bacteria, UA None Seen None Seen, Occasional /hpf   ECG 12 lead    Collection Time: 01/31/25 11:02 AM   Result Value Ref Range    Ventricular Rate 102 BPM    Atrial Rate 80 BPM    NJ Interval  ms    QRSD Interval 114 ms    QT Interval 382 ms    QTC Interval 497 ms    P Axis  degrees    QRS Axis 118 degrees    T Wave Axis 16 degrees   CBC and differential    Collection Time: 01/31/25 11:34 AM   Result Value Ref Range    WBC 8.10 4.31 - 10.16 Thousand/uL    RBC 4.76 3.81 - 5.12 Million/uL    Hemoglobin 13.3 11.5 - 15.4 g/dL    Hematocrit 41.8 34.8 - 46.1 %    MCV 88 82 - 98 fL    MCH 27.9 26.8 - 34.3 pg    MCHC 31.8 31.4 - 37.4 g/dL    RDW 14.5 11.6 - 15.1 %    MPV 11.7 8.9 - 12.7 fL    Platelets 138 (L) 149 - 390 Thousands/uL    nRBC 0 /100 WBCs    Segmented % 85 (H) 43 - 75 %    Immature Grans % 0 0 - 2 %    Lymphocytes % 8 (L) 14 - 44 %    Monocytes % 6 4 - 12 %    Eosinophils Relative 1 0 - 6 %    Basophils Relative 0 0 - 1 %    Absolute Neutrophils 6.82 1.85 - 7.62 Thousands/µL    Absolute Immature Grans 0.02 0.00 - 0.20 Thousand/uL    Absolute Lymphocytes 0.68 0.60 - 4.47 Thousands/µL    Absolute Monocytes 0.51 0.17 - 1.22 Thousand/µL    Eosinophils Absolute 0.05 0.00 - 0.61 Thousand/µL    Basophils Absolute 0.02 0.00 - 0.10 Thousands/µL   Comprehensive metabolic panel    Collection Time: 01/31/25 11:34 AM   Result Value Ref Range    Sodium 140 135 - 147 mmol/L    Potassium 4.7 3.5 - 5.3 mmol/L    Chloride 104 96 - 108 mmol/L    CO2 23 21 - 32 mmol/L    ANION  "GAP 13 4 - 13 mmol/L    BUN 30 (H) 5 - 25 mg/dL    Creatinine 1.18 0.60 - 1.30 mg/dL    Glucose 94 65 - 140 mg/dL    Calcium 9.0 8.4 - 10.2 mg/dL    AST 33 13 - 39 U/L    ALT 20 7 - 52 U/L    Alkaline Phosphatase 101 34 - 104 U/L    Total Protein 7.4 6.4 - 8.4 g/dL    Albumin 4.2 3.5 - 5.0 g/dL    Total Bilirubin 0.99 0.20 - 1.00 mg/dL    eGFR 42 ml/min/1.73sq m   HS Troponin 0hr (reflex protocol)    Collection Time: 01/31/25 11:34 AM   Result Value Ref Range    hs TnI 0hr 31 \"Refer to ACS Flowchart\"- see link ng/L   B-Type Natriuretic Peptide(BNP)    Collection Time: 01/31/25 11:34 AM   Result Value Ref Range     (H) 0 - 100 pg/mL   FLU/COVID Rapid Antigen (30 min. TAT) - Preferred screening test in ED    Collection Time: 01/31/25 11:34 AM    Specimen: Nose; Nares   Result Value Ref Range    SARS COV Rapid Antigen Negative Negative    Influenza A Rapid Antigen Negative Negative    Influenza B Rapid Antigen Negative Negative   Protime-INR    Collection Time: 01/31/25 11:34 AM   Result Value Ref Range    Protime 16.8 (H) 12.3 - 15.0 seconds    INR 1.31 (H) 0.85 - 1.19   APTT    Collection Time: 01/31/25 11:34 AM   Result Value Ref Range    PTT 34 23 - 34 seconds   D-dimer, quantitative    Collection Time: 01/31/25 11:34 AM   Result Value Ref Range    D-Dimer, Quant 0.84 (H) <0.50 ug/ml FEU   HS Troponin I 2hr    Collection Time: 01/31/25  1:29 PM   Result Value Ref Range    hs TnI 2hr 28 \"Refer to ACS Flowchart\"- see link ng/L    Delta 2hr hsTnI -3 <20 ng/L   Fingerstick Glucose (POCT)    Collection Time: 01/31/25  4:58 PM   Result Value Ref Range    POC Glucose 88 65 - 140 mg/dl   Fingerstick Glucose (POCT)    Collection Time: 01/31/25  8:30 PM   Result Value Ref Range    POC Glucose 114 65 - 140 mg/dl   Basic metabolic panel    Collection Time: 02/01/25  4:31 AM   Result Value Ref Range    Sodium 140 135 - 147 mmol/L    Potassium 3.8 3.5 - 5.3 mmol/L    Chloride 105 96 - 108 mmol/L    CO2 20 (L) 21 - 32 " mmol/L    ANION GAP 15 (H) 4 - 13 mmol/L    BUN 30 (H) 5 - 25 mg/dL    Creatinine 1.23 0.60 - 1.30 mg/dL    Glucose 86 65 - 140 mg/dL    Glucose, Fasting 86 65 - 99 mg/dL    Calcium 8.6 8.4 - 10.2 mg/dL    eGFR 40 ml/min/1.73sq m   Magnesium    Collection Time: 02/01/25  4:31 AM   Result Value Ref Range    Magnesium 2.1 1.9 - 2.7 mg/dL   TSH, 3rd generation with Free T4 reflex    Collection Time: 02/01/25  4:31 AM   Result Value Ref Range    TSH 3RD GENERATON 0.997 0.450 - 4.500 uIU/mL   Fingerstick Glucose (POCT)    Collection Time: 02/01/25  7:42 AM   Result Value Ref Range    POC Glucose 83 65 - 140 mg/dl   D-dimer, quantitative    Collection Time: 02/01/25 10:27 AM   Result Value Ref Range    D-Dimer, Quant 0.64 (H) <0.50 ug/ml FEU   Fingerstick Glucose (POCT)    Collection Time: 02/01/25 11:44 AM   Result Value Ref Range    POC Glucose 137 65 - 140 mg/dl   Fingerstick Glucose (POCT)    Collection Time: 02/01/25  4:38 PM   Result Value Ref Range    POC Glucose 95 65 - 140 mg/dl   Fingerstick Glucose (POCT)    Collection Time: 02/01/25  8:21 PM   Result Value Ref Range    POC Glucose 123 65 - 140 mg/dl   CBC and differential    Collection Time: 02/02/25  6:23 AM   Result Value Ref Range    WBC 6.31 4.31 - 10.16 Thousand/uL    RBC 4.28 3.81 - 5.12 Million/uL    Hemoglobin 12.2 11.5 - 15.4 g/dL    Hematocrit 37.3 34.8 - 46.1 %    MCV 87 82 - 98 fL    MCH 28.5 26.8 - 34.3 pg    MCHC 32.7 31.4 - 37.4 g/dL    RDW 14.3 11.6 - 15.1 %    MPV 10.9 8.9 - 12.7 fL    Platelets 148 (L) 149 - 390 Thousands/uL    nRBC 0 /100 WBCs    Segmented % 74 43 - 75 %    Immature Grans % 0 0 - 2 %    Lymphocytes % 14 14 - 44 %    Monocytes % 11 4 - 12 %    Eosinophils Relative 1 0 - 6 %    Basophils Relative 0 0 - 1 %    Absolute Neutrophils 4.63 1.85 - 7.62 Thousands/µL    Absolute Immature Grans 0.01 0.00 - 0.20 Thousand/uL    Absolute Lymphocytes 0.89 0.60 - 4.47 Thousands/µL    Absolute Monocytes 0.68 0.17 - 1.22 Thousand/µL     Eosinophils Absolute 0.08 0.00 - 0.61 Thousand/µL    Basophils Absolute 0.02 0.00 - 0.10 Thousands/µL   Basic metabolic panel    Collection Time: 02/02/25  6:23 AM   Result Value Ref Range    Sodium 141 135 - 147 mmol/L    Potassium 3.8 3.5 - 5.3 mmol/L    Chloride 104 96 - 108 mmol/L    CO2 23 21 - 32 mmol/L    ANION GAP 14 (H) 4 - 13 mmol/L    BUN 34 (H) 5 - 25 mg/dL    Creatinine 1.27 0.60 - 1.30 mg/dL    Glucose 81 65 - 140 mg/dL    Calcium 8.5 8.4 - 10.2 mg/dL    eGFR 38 ml/min/1.73sq m   Magnesium    Collection Time: 02/02/25  6:23 AM   Result Value Ref Range    Magnesium 2.1 1.9 - 2.7 mg/dL   Fingerstick Glucose (POCT)    Collection Time: 02/02/25  7:50 AM   Result Value Ref Range    POC Glucose 116 65 - 140 mg/dl   Fingerstick Glucose (POCT)    Collection Time: 02/02/25 12:20 PM   Result Value Ref Range    POC Glucose 137 65 - 140 mg/dl   Fingerstick Glucose (POCT)    Collection Time: 02/02/25  4:40 PM   Result Value Ref Range    POC Glucose 104 65 - 140 mg/dl   Fingerstick Glucose (POCT)    Collection Time: 02/02/25  9:46 PM   Result Value Ref Range    POC Glucose 113 65 - 140 mg/dl   CBC and differential    Collection Time: 02/03/25  5:45 AM   Result Value Ref Range    WBC 7.97 4.31 - 10.16 Thousand/uL    RBC 4.35 3.81 - 5.12 Million/uL    Hemoglobin 12.2 11.5 - 15.4 g/dL    Hematocrit 37.9 34.8 - 46.1 %    MCV 87 82 - 98 fL    MCH 28.0 26.8 - 34.3 pg    MCHC 32.2 31.4 - 37.4 g/dL    RDW 14.4 11.6 - 15.1 %    MPV 10.9 8.9 - 12.7 fL    Platelets 152 149 - 390 Thousands/uL    nRBC 0 /100 WBCs    Segmented % 76 (H) 43 - 75 %    Immature Grans % 0 0 - 2 %    Lymphocytes % 11 (L) 14 - 44 %    Monocytes % 11 4 - 12 %    Eosinophils Relative 2 0 - 6 %    Basophils Relative 0 0 - 1 %    Absolute Neutrophils 6.03 1.85 - 7.62 Thousands/µL    Absolute Immature Grans 0.03 0.00 - 0.20 Thousand/uL    Absolute Lymphocytes 0.91 0.60 - 4.47 Thousands/µL    Absolute Monocytes 0.85 0.17 - 1.22 Thousand/µL    Eosinophils  Absolute 0.12 0.00 - 0.61 Thousand/µL    Basophils Absolute 0.03 0.00 - 0.10 Thousands/µL   Basic metabolic panel    Collection Time: 02/03/25  5:45 AM   Result Value Ref Range    Sodium 139 135 - 147 mmol/L    Potassium 3.7 3.5 - 5.3 mmol/L    Chloride 102 96 - 108 mmol/L    CO2 23 21 - 32 mmol/L    ANION GAP 14 (H) 4 - 13 mmol/L    BUN 31 (H) 5 - 25 mg/dL    Creatinine 1.07 0.60 - 1.30 mg/dL    Glucose 92 65 - 140 mg/dL    Calcium 8.9 8.4 - 10.2 mg/dL    eGFR 47 ml/min/1.73sq m   Magnesium    Collection Time: 02/03/25  5:45 AM   Result Value Ref Range    Magnesium 1.9 1.9 - 2.7 mg/dL   Fingerstick Glucose (POCT)    Collection Time: 02/03/25  7:34 AM   Result Value Ref Range    POC Glucose 111 65 - 140 mg/dl   Stress strip    Collection Time: 02/03/25 10:26 AM   Result Value Ref Range    Protocol Name DAILY SIT     Exercise duration (min) 3 min    Exercise duration (sec) 0 sec    Post Peak Systolic  mmHg    Max Diastolic Bp 57 mmHg    Peak  BPM    Max Predicted Heart Rate 135 BPM    Reason for Termination Protocol Complete     Test Indication Chest Discomfort, SOB     Target Hr Formular (220 - Age)*100%     Arrhy During Ex      ECG Interp Before Ex      ECG Interp during Ex      Ex Summary Comment      Chest Pain Statement none     Overall Hr Response To Exercise      Overall BP Response To Exercise     ECG 12 lead    Collection Time: 02/03/25 10:28 AM   Result Value Ref Range    Ventricular Rate 95 BPM    Atrial Rate 375 BPM    MA Interval  ms    QRSD Interval 118 ms    QT Interval 400 ms    QTC Interval 502 ms    P Axis  degrees    QRS Axis 106 degrees    T Wave Axis -17 degrees   Fingerstick Glucose (POCT)    Collection Time: 02/03/25 11:43 AM   Result Value Ref Range    POC Glucose 169 (H) 65 - 140 mg/dl   NM Myocardial Perfusion Spect (Pharmacological Induced Stress and/or Rest)    Collection Time: 02/03/25 11:59 AM   Result Value Ref Range    Rest Nuclear Isotope Dose 11.00 mCi    Stress Nuclear  Isotope Dose 32.80 mCi    Baseline HR 93 bpm    Baseline /59 mmHg    O2 sat rest 96 %    Stress peak  bpm    Post peak  mmHg    O2 sat peak 96 %    Recovery  bpm    Recovery /57 mmHg    O2 sat recovery 97 %    Max  bpm    Max HR Percent 94 %    Exercise duration (min) 1 min    Estimated workload 1.0 METS    Rate Pressure Product 12,584.0     Angina Index 0     Stress/rest perfusion ratio 0.94     EF (%) 73 %   ECG 12 lead    Collection Time: 02/03/25  1:27 PM   Result Value Ref Range    Ventricular Rate 104 BPM    Atrial Rate  BPM    FL Interval  ms    QRSD Interval 124 ms    QT Interval 350 ms    QTC Interval 461 ms    P Axis  degrees    QRS Axis 103 degrees    T Wave Axis -43 degrees   Fingerstick Glucose (POCT)    Collection Time: 02/03/25  3:58 PM   Result Value Ref Range    POC Glucose 72 65 - 140 mg/dl   Fingerstick Glucose (POCT)    Collection Time: 02/03/25  4:39 PM   Result Value Ref Range    POC Glucose 119 65 - 140 mg/dl   Fingerstick Glucose (POCT)    Collection Time: 02/03/25 10:50 PM   Result Value Ref Range    POC Glucose 94 65 - 140 mg/dl   Basic metabolic panel    Collection Time: 02/04/25  5:44 AM   Result Value Ref Range    Sodium 141 135 - 147 mmol/L    Potassium 3.9 3.5 - 5.3 mmol/L    Chloride 103 96 - 108 mmol/L    CO2 25 21 - 32 mmol/L    ANION GAP 13 4 - 13 mmol/L    BUN 28 (H) 5 - 25 mg/dL    Creatinine 1.18 0.60 - 1.30 mg/dL    Glucose 97 65 - 140 mg/dL    Calcium 9.2 8.4 - 10.2 mg/dL    eGFR 42 ml/min/1.73sq m   Fingerstick Glucose (POCT)    Collection Time: 02/04/25  7:35 AM   Result Value Ref Range    POC Glucose 103 65 - 140 mg/dl   Fingerstick Glucose (POCT)    Collection Time: 02/04/25 11:42 AM   Result Value Ref Range    POC Glucose 123 65 - 140 mg/dl       Assessment/Plan:    Persistent atrial fibrillation (HCC)  Remains on metoprolol and Eliquis for anticoagulation.  These are prescribed by cardiology.  Follow-up with cardiology as  scheduled    Peripheral arteriosclerosis (HCC)  Remains on anticoagulation as well as atorvastatin.  Continue same.  Follow-up with cardiology    Hypertensive heart and chronic kidney disease with heart failure and stage 1 through stage 4 chronic kidney disease, or chronic kidney disease (HCC)  Wt Readings from Last 3 Encounters:   02/13/25 66.2 kg (146 lb)   02/06/25 65.8 kg (145 lb)   02/04/25 62.1 kg (137 lb)         Based upon age.  Control blood pressure.  Remains on losartan.  Follow-up with nephrology      Chronic diastolic (congestive) heart failure (HCC)  Wt Readings from Last 3 Encounters:   02/13/25 66.2 kg (146 lb)   02/06/25 65.8 kg (145 lb)   02/04/25 62.1 kg (137 lb)     Seems to be euvolemic.  They are performing daily weights.  Managed by cardiology.  Follow-up with cardiology          Benign essential hypertension  On beta-blocker and losartan.  Seems to be adequately controlled.  Follow-up with cardiology as scheduled    Hypothyroidism  I will assume management of her hypothyroidism.  Repeat TSH ordered to be done in July before her next appointment with me.  Last TSH was therapeutic on levothyroxine 88 mcg once daily.  Continue same    CKD (chronic kidney disease), stage III (HCC)  Lab Results   Component Value Date    EGFR 42 02/04/2025    EGFR 47 02/03/2025    EGFR 38 02/02/2025    CREATININE 1.18 02/04/2025    CREATININE 1.07 02/03/2025    CREATININE 1.27 02/02/2025   Creatinine at baseline.  Follow-up with nephrology as scheduled.  Remains on angiotensin receptor blocker      Chronic gout without tophus  Check repeat uric acid.  No recent gouty flares    -If she does develop gouty flares then she will need to be placed on Uloric due to chronic kidney disease    Prediabetes  A1c to be done in July.  Watch dietary intake of carbohydrates at 84 years of age    Hyperuricemia  No recent gouty flares.  She is no longer on any agents.  She is not on allopurinol.  Allopurinol would not be recommended  given her chronic kidney disease diagnosis.  If she needed to be on something it would be Uloric          Problem List Items Addressed This Visit        Cardiovascular and Mediastinum    RESOLVED: Acute on chronic diastolic congestive heart failure (HCC)    Benign essential hypertension    On beta-blocker and losartan.  Seems to be adequately controlled.  Follow-up with cardiology as scheduled         Relevant Orders    CBC and differential    Chronic diastolic (congestive) heart failure (HCC)    Wt Readings from Last 3 Encounters:   02/13/25 66.2 kg (146 lb)   02/06/25 65.8 kg (145 lb)   02/04/25 62.1 kg (137 lb)     Seems to be euvolemic.  They are performing daily weights.  Managed by cardiology.  Follow-up with cardiology               Relevant Orders    Lipid panel    Hypertensive heart and chronic kidney disease with heart failure and stage 1 through stage 4 chronic kidney disease, or chronic kidney disease (HCC) - Primary    Wt Readings from Last 3 Encounters:   02/13/25 66.2 kg (146 lb)   02/06/25 65.8 kg (145 lb)   02/04/25 62.1 kg (137 lb)         Based upon age.  Control blood pressure.  Remains on losartan.  Follow-up with nephrology           Peripheral arteriosclerosis (HCC)    Remains on anticoagulation as well as atorvastatin.  Continue same.  Follow-up with cardiology         Persistent atrial fibrillation (HCC)    Remains on metoprolol and Eliquis for anticoagulation.  These are prescribed by cardiology.  Follow-up with cardiology as scheduled         Relevant Orders    TSH, 3rd generation with Free T4 reflex    Hemoglobin A1C       Endocrine    Hypothyroidism (Chronic)    I will assume management of her hypothyroidism.  Repeat TSH ordered to be done in July before her next appointment with me.  Last TSH was therapeutic on levothyroxine 88 mcg once daily.  Continue same            Genitourinary    CKD (chronic kidney disease), stage III (HCC) (Chronic)    Lab Results   Component Value Date    EGFR  42 02/04/2025    EGFR 47 02/03/2025    EGFR 38 02/02/2025    CREATININE 1.18 02/04/2025    CREATININE 1.07 02/03/2025    CREATININE 1.27 02/02/2025   Creatinine at baseline.  Follow-up with nephrology as scheduled.  Remains on angiotensin receptor blocker           Relevant Orders    Comprehensive metabolic panel       Orthopedic/Musculoskeletal    Chronic gout without tophus    Check repeat uric acid.  No recent gouty flares    -If she does develop gouty flares then she will need to be placed on Uloric due to chronic kidney disease            Other    Hyperuricemia    No recent gouty flares.  She is no longer on any agents.  She is not on allopurinol.  Allopurinol would not be recommended given her chronic kidney disease diagnosis.  If she needed to be on something it would be Uloric         Relevant Orders    Uric acid    Prediabetes    A1c to be done in July.  Watch dietary intake of carbohydrates at 84 years of age         Relevant Orders    Hemoglobin A1C

## 2025-02-13 NOTE — ASSESSMENT & PLAN NOTE
Wt Readings from Last 3 Encounters:   02/13/25 66.2 kg (146 lb)   02/06/25 65.8 kg (145 lb)   02/04/25 62.1 kg (137 lb)     Seems to be euvolemic.  They are performing daily weights.  Managed by cardiology.  Follow-up with cardiology

## 2025-02-13 NOTE — ASSESSMENT & PLAN NOTE
I will assume management of her hypothyroidism.  Repeat TSH ordered to be done in July before her next appointment with me.  Last TSH was therapeutic on levothyroxine 88 mcg once daily.  Continue same

## 2025-02-13 NOTE — ASSESSMENT & PLAN NOTE
Wt Readings from Last 3 Encounters:   02/13/25 66.2 kg (146 lb)   02/06/25 65.8 kg (145 lb)   02/04/25 62.1 kg (137 lb)         Based upon age.  Control blood pressure.  Remains on losartan.  Follow-up with nephrology

## 2025-02-13 NOTE — ASSESSMENT & PLAN NOTE
Lab Results   Component Value Date    EGFR 42 02/04/2025    EGFR 47 02/03/2025    EGFR 38 02/02/2025    CREATININE 1.18 02/04/2025    CREATININE 1.07 02/03/2025    CREATININE 1.27 02/02/2025   Creatinine at baseline.  Follow-up with nephrology as scheduled.  Remains on angiotensin receptor blocker

## 2025-02-13 NOTE — ASSESSMENT & PLAN NOTE
Remains on anticoagulation as well as atorvastatin.  Continue same.  Follow-up with cardiology   Plan: If doesn’t resolve may need to return for biopsy or ED&C treatment Detail Level: Simple

## 2025-02-17 ENCOUNTER — PATIENT OUTREACH (OUTPATIENT)
Dept: CASE MANAGEMENT | Facility: OTHER | Age: 85
End: 2025-02-17

## 2025-02-17 NOTE — PROGRESS NOTES
Outpatient Care Management Note:    Voice mail message left for Rosalind, with my contact information, requesting a call back.     Call received from Guerda. She states that she is feeling great. She denies any further right hand pain. She denies any shortness of breath or swelling. Her weight is steady between 144-145 lbs. She denies gaining 3 lb in 1 day or 5 lb in 1 week. She knows to call cardiology if she would hit these parameters.     Rosalind states that she and her daughter are watching her fluid and salt intake. She denies any questions.     Initial assessment completed and careplan updated.

## 2025-02-18 ENCOUNTER — PATIENT OUTREACH (OUTPATIENT)
Dept: CASE MANAGEMENT | Facility: OTHER | Age: 85
End: 2025-02-18

## 2025-02-20 ENCOUNTER — OFFICE VISIT (OUTPATIENT)
Age: 85
End: 2025-02-20
Payer: MEDICARE

## 2025-02-20 VITALS — RESPIRATION RATE: 17 BRPM | HEIGHT: 60 IN | WEIGHT: 146 LBS | BODY MASS INDEX: 28.66 KG/M2

## 2025-02-20 DIAGNOSIS — M77.41 METATARSALGIA OF BOTH FEET: Primary | ICD-10-CM

## 2025-02-20 DIAGNOSIS — B35.9 DERMATOPHYTOSIS: ICD-10-CM

## 2025-02-20 DIAGNOSIS — G89.29 CHRONIC PAIN OF RIGHT ANKLE: ICD-10-CM

## 2025-02-20 DIAGNOSIS — M25.571 CHRONIC PAIN OF RIGHT ANKLE: ICD-10-CM

## 2025-02-20 DIAGNOSIS — M77.42 METATARSALGIA OF BOTH FEET: Primary | ICD-10-CM

## 2025-02-20 DIAGNOSIS — M54.16 RADICULOPATHY OF LUMBAR REGION: ICD-10-CM

## 2025-02-20 DIAGNOSIS — I70.209 PERIPHERAL ARTERIOSCLEROSIS (HCC): ICD-10-CM

## 2025-02-20 PROCEDURE — 99213 OFFICE O/P EST LOW 20 MIN: CPT | Performed by: PODIATRIST

## 2025-02-20 NOTE — PROGRESS NOTES
Assessment/Plan: Metatarsalgia secondary to radiculopathy.  Pain upon ambulation.  Mycosis of nail.  Dermatophytosis.  Patient with peripheral artery disease.  Plantar fasciitis right foot.  Ongoing chronic pain right ankle.  Osteoarthritis.     Plan.  Chart reviewed.  PCP notes reviewed.  Lab work reviewed . patient examined.  Patient advised on condition.  At this time patient remain on Cymbalta as directed.  In addition she will use over-the-counter antifungal.  For xerosis, patient will use moisturizer after bathing.  Shoe size evaluated.  Shoe style recommended.  Aftercare instruction given.  Return for follow-up.                          Diagnoses and all orders for this visit:     Metatarsalgia of both feet     Radiculopathy of lumbar region     Dermatophytosis     Onychomycosis     Peripheral arteriosclerosis     Plantar fasciitis right foot.            Subjective: Patient has pain in her feet.  She has low back pain.  She gets some relief from Cymbalta.  She also complains of ingrown toenails.  No history of trauma.  Patient gets chronic pain in the right lower extremity.  She has heel pain.  She has ankle pain.  No history of trauma.        Patient recently has had right heel pain.  She has pain upon rising.  No history of trauma.        No Known Allergies        Current Outpatient Medications:     amLODIPine (NORVASC) 10 mg tablet, Take 0.5 tablets (5 mg total) by mouth daily, Disp: 90 tablet, Rfl: 0    apixaban (Eliquis) 2.5 mg, Take 1 tablet (2.5 mg total) by mouth 2 (two) times a day, Disp: 180 tablet, Rfl: 3    atorvastatin (LIPITOR) 40 mg tablet, Take 1 tablet (40 mg total) by mouth every evening, Disp: 90 tablet, Rfl: 1    bumetanide (BUMEX) 2 mg tablet, Take 0.5 tablets (1 mg total) by mouth daily, Disp: , Rfl:     Diclofenac Sodium (VOLTAREN) 1 %, Apply 2 g topically in the morning APPLY NO MORE THAN 3 DAYS IN A ROW THEN TAKE A BREAK FOR ONE WEEK FROM USE., Disp: 100 g, Rfl: 1    levothyroxine  88 mcg tablet, TAKE 1 TABLET EVERY DAY, Disp: 90 tablet, Rfl: 1    losartan (COZAAR) 25 mg tablet, TAKE 2 TABLETS IN THE MORNING AND TAKE 1 TABLET IN THE EVENING, Disp: 270 tablet, Rfl: 10    metoprolol tartrate (LOPRESSOR) 25 mg tablet, Take 0.5 tablets (12.5 mg total) by mouth every 12 (twelve) hours, Disp: 45 tablet, Rfl: 3    pantoprazole (PROTONIX) 20 mg tablet, TAKE 1 TABLET EVERY DAY, Disp: 90 tablet, Rfl: 1    spironolactone (ALDACTONE) 25 mg tablet, Take 0.5 tablets (12.5 mg total) by mouth daily, Disp: 45 tablet, Rfl: 3    gabapentin (NEURONTIN) 100 mg capsule, Take 1 tablet at bedtime.  May increase to 2 tablets after 3 days (Patient not taking: Reported on 12/15/2023), Disp: 90 capsule, Rfl: 1    ketoconazole (NIZORAL) 2 % cream, Apply topically daily, Disp: 60 g, Rfl: 1    senna-docusate sodium (SENOKOT S) 8.6-50 mg per tablet, Take 1 tablet by mouth daily (Patient not taking: Reported on 12/18/2023), Disp: 20 tablet, Rfl: 1           Patient Active Problem List   Diagnosis    Benign essential hypertension    Chronic diastolic (congestive) heart failure (HCC)    Hypothyroidism    Arthropathy of knee    Hallux abductovalgus with bunions, unspecified laterality    CKD (chronic kidney disease), stage III (HCC)    Diverticulitis of colon    Chronic gout without tophus    Hiatal hernia    Hyperlipemia    Hyperuricemia    Nephrolithiasis    WENDI (obstructive sleep apnea)    Pseudogout of left wrist    Gastroesophageal reflux disease without esophagitis    Exertional shortness of breath    Prediabetes    Status post right frontotemporal replacement cranioplasty    BMI 38.0-38.9,adult    Sciatica of left side    Spinal stenosis of lumbar region with neurogenic claudication    Cervical radiculopathy    Paroxysmal atrial fibrillation (HCC)    PAC (premature atrial contraction)             Patient ID: Rosalind Wise is a 84 y.o. female.     HPI     The following portions of the patient's history were reviewed and  updated as appropriate:      family history includes Arthritis in her family and mother; Coronary artery disease in her mother; Diabetes in her brother, mother, and son; Hypertension in her father and mother.       reports that she has never smoked. She has never used smokeless tobacco. She reports current alcohol use. She reports that she does not use drugs.      Objective:  Patient's shoes and socks removed.   Foot ExamPhysical Exam       Physical Exam   Left Foot: Appearance: Normal except as noted: excessive pronation-- and-- pes planus.    Right Foot: Appearance: Normal except as noted: excessive pronation-- and-- pes planus. Tenderness: None except the calcaneous,-- medial calcaneous-- and-- insertion of the plantar fascia.  Patient has an enlarged hallux valgus deformity with inflamed bunion.  Left Ankle: Appearance: Normal except ecchymosis-- and-- swelling laterally. ROM: limited ROM in all planes    Right Ankle: ROM: limited ROM in all planes Motor: diffuse weakness.  Pain with palpation anterior lateral aspect right ankle joint mortise.    Neurological Exam: performed. Light touch was intact bilaterally. Vibratory sensation was intact bilaterally. Response to monofilament test was intact bilaterally. Deep tendon reflexes: patellar reflex present bilaterally-- and-- achilles reflex present bilaterally.    Vascular Exam: performed Dorsalis pedis pulses were 1/4 bilaterally. Posterior tibial pulses were 1/4 bilaterally. Elevation Pallor: diminished bilaterally. Capillary refill time was greater than 3 seconds bilaterally-- and-- Q9 findings bilateral. Negative digital hair noted. Positive abnormal cooling bilateral. Edema: moderate bilaterally-- and-- 6/7 pitting edema. Negative Homans sign.    Toenails: All of the toenails were elongated,-- hypertrophied,-- discolored-- and-- Mycotic with onychogryphosis. Note is made of bilateral tinea pedis in moccasin foot. Distribution.         Socks and shoes removed,  Right Foot Findings: swollen, erythematous and dry.      The sensory exam showed diminished vibratory sensation at the level of the toes. Diminished tactile sensation with monofilament testing throughout the right foot.      Socks and shoes removed, Left Foot Findings: swollen, erythematous and dry.      The sensory exam showed diminished vibratory sensation at the level of the toes. Diminished tactile sensation with monofilament testing throughout the left foot.     Capillary refills findings on the right were delayed in the toes.      Pulses:      1+ in the posterior tibialis on the right      1+ in the dorsalis pedis on the right.      Capillary refills findings on the left were delayed in the toes.      Pulses:      1+ in the posterior tibialis on the left      1+ in the dorsalis pedis on the left.      Assign Risk Category: 2: Loss of protective sensation with or without weakness, deformity, callus, pre-ulcer, or history of ulceration. High risk.    Hyperkeratosis: present on both first toes,-- present on both first sub metatarsals-- and-- Bilateral plantar moccasin tinea pedis noted.    Shoe Gear Evaluation: performed (). Recommendation(s): SAS style.

## 2025-02-25 NOTE — DISCHARGE INSTR - AVS FIRST PAGE
Dear Tigist Fermin,     It was our pleasure to care for you here at [facility]  It is our hope that we were always able to exceed the expected standards for your care during your stay  You were hospitalized due to chest pain and altered mental status  You were cared for on the 3rd floor by Preston Beyer MD under the service of Ernesto Holden MD with the [facility] who covers for your primary care physician (PCP), Keith Marques MD, while you were hospitalized  If you have any questions or concerns related to this hospitalization, you may contact us at [phone]  For follow up as well as any medication refills, we recommend that you follow up with your primary care physician  A registered nurse will reach out to you by phone within a few days after your discharge to answer any additional questions that you may have after going home  However, at this time we provide for you here, the most important instructions / recommendations at discharge:       · Notable Medication Adjustments -   · None  · Testing Required after Discharge -   · Consultation with gerontology was recommended  · Important follow up information -   · Be sure to follow-up with your primary care physician and cardiologist upon discharge    · Please review this entire after visit summary as additional general instructions including medication list, appointments, activity, diet, any pertinent wound care, and other additional recommendations from your care team that may be provided for you        Sincerely,     Preston Beyer MD
Frequent PVCs

## 2025-03-04 ENCOUNTER — PATIENT OUTREACH (OUTPATIENT)
Dept: CASE MANAGEMENT | Facility: OTHER | Age: 85
End: 2025-03-04

## 2025-03-04 NOTE — PROGRESS NOTES
Outpatient Care Management Note:    Care manager called Rosalind. She states that her weight has been stable at 144 lbs. She knows to call cardiology if she would gain by 3/5 pound rule. Rosalind denies any swelling, shortness of breath or decreased exercise tolerance. She is scheduled to see cardiology on 3/6.      Rosalind continues to watch her fluid and salt intake.  She denies any questions related to this.     CM will follow up one more time.

## 2025-03-06 ENCOUNTER — OFFICE VISIT (OUTPATIENT)
Dept: CARDIOLOGY CLINIC | Facility: CLINIC | Age: 85
End: 2025-03-06
Payer: MEDICARE

## 2025-03-06 VITALS
SYSTOLIC BLOOD PRESSURE: 128 MMHG | HEART RATE: 78 BPM | BODY MASS INDEX: 28.27 KG/M2 | WEIGHT: 144 LBS | OXYGEN SATURATION: 100 % | DIASTOLIC BLOOD PRESSURE: 62 MMHG | HEIGHT: 60 IN

## 2025-03-06 DIAGNOSIS — I13.0 HYPERTENSIVE HEART AND CHRONIC KIDNEY DISEASE WITH HEART FAILURE AND STAGE 1 THROUGH STAGE 4 CHRONIC KIDNEY DISEASE, OR CHRONIC KIDNEY DISEASE (HCC): ICD-10-CM

## 2025-03-06 DIAGNOSIS — I48.19 PERSISTENT ATRIAL FIBRILLATION (HCC): ICD-10-CM

## 2025-03-06 DIAGNOSIS — I10 BENIGN ESSENTIAL HYPERTENSION: ICD-10-CM

## 2025-03-06 DIAGNOSIS — I50.32 CHRONIC DIASTOLIC (CONGESTIVE) HEART FAILURE (HCC): Primary | ICD-10-CM

## 2025-03-06 PROCEDURE — 99214 OFFICE O/P EST MOD 30 MIN: CPT | Performed by: INTERNAL MEDICINE

## 2025-03-06 NOTE — PROGRESS NOTES
Cardiology Follow Up  Rosalind Wise  1940  [id number]  [facility] PROFESSIONAL [facility] [place] [id number] [facility] [id number]    No diagnosis found.     Discussion/Plan:  Exertional shortness of breath/fatigue/chest pain-she is compliant with her diuretic.  Her weight is stable  She is currently taking bumetanide 1 mg daily.  Spironolactone 12.5 mg daily.  She does not want to increase her loop diuretic.  We will add on low-dose SGLT2.  Component of her shortness of breath may be secondary to severe pulmonary hypertension.  Plan for nuclear stress test to rule out ischemia. Monitor weights and breathing. Patient will inform if with continued weight gain/change in symptoms.    Atrial fibrillation persistent-  Continue metoprolol 12.5 mg twice a day.  Eliquis 2.5 mg.      Moderate to severe mitral regurgitation- blood pressure optimized.  Better on losartan 25mg daily..  We discussed about tight blood pressure control.   She will continue with a low-salt diet.  Her blood pressures have been optimized.  Possible consideration for MitraClip evaluation.   IF worsens or symptoms, plan to consider mitral clip. Recheck     Intraparenchymal bleed/cranioplasty- previous history which makes her at higher risk for anticoagulation usage.    Acute on chronic diastolic heart failure with a component of lymphedema- improved volume status. Lower ext better  - Weight loss  - Compression socks + wedge pillow  - Increase bumex 1mg daily +  - Low-salt diet   - Elevation of legs + walking  -- if with continued lower extremity edema consideration for compression boots    Sleep apnea  -- not wearing cpap    Incomplete rbbb    Acute on chronic kidney disease follow-up with renal    Triglycerides- controlled  - 4000 mg omega-3 daily      Interval History:  She denies having chest pain. Her edema is better. Taking omega 3 currently. No bleeding  or bruising.  No dizziness or light-headness. Blood pressure very well controlled. Has back pain.    12/11/2018:  She reports having some exertional shortness of breath.  Her lower extremity edema has improved but is persistent.  She denies feeling dizziness or lightheadedness.  She denies having any falls.  She denies having any bleeding or bruising.    06/12/2019:  She reports some improvement in her shortness of breath but still has dyspnea while sitting.  She is unable to walk secondary to severe back pain.  She denies feeling dizziness or lightheadedness.  Her lower extremity edema has improved.  She is trying a elevator legs.  She does wear compression socks.  She is compliant with CPAP.    4/1/20: 79-year-old with recent intraparenchymal hemorrhage status post right decompressive craniectomy and evacuation of intraparenchymal hematoma.  She is pending follow-up bone flap by neuro surgery.  She tolerated her previous procedure without evidence of decompensated heart failure.  She was noted to have some bradycardia and her beta-blocker was discontinued.  She is compliant with her antihypertensive medications.  She is compliant with her diuretics.  She denies having significant worsening in her chronic dyspnea or lower extremity edema    02/24/2021:  She denies currently feeling significant shortness of breath.  She reports her lower extremity swelling is well controlled.  We reviewed through her recent EKG.  No prior history of atrial fibrillation.  She is compliant with her medications.  Denies having any falls.    06/07/2021:  Her weight has been stable.  She denies having major lower extremity swelling.  We reviewed through her last echocardiogram.  Reviewed through her Holter monitor which did not show evidence of atrial fibrillation or atrial flutter.  She is compliant with her CPAP.  She is compliant with her diuretics.  Reviewed her last lab work.    01/21/2022:  Her blood pressures have been controlled.   She denies having major shortness of breath.  She denies having chest heaviness.  We reviewed through her recent echocardiogram.  No major change.    05/27/2022:  She had a mechanical fall 2 weeks ago.  She is now seen in atrial fibrillation newly recognized.  Her last event monitor from last year was negative for atrial fibrillation.  She denies having major palpitations.  She has chronic shortness of breath.  She states her weight has been stable on her lower extremity swelling has been also stable.  She has been compliant with her diuretics.  She denies feeling dizziness.    06/21/2022:  She denies having recurrence of fall.  She still has some exertional dyspnea.  However overall she is feeling well.  We reviewed her event monitor.  S she is awaiting CPAP mask titration    08/04/2022:  She denies having major bleeding.  She is wearing her CPAP at night.  She denies having any falls.  We reviewed through her event monitor.    12/19/2022: She is compliant with her CPAP.  Her breathing has been stable.  She denies major bleeding.  She states lower extremity swelling has been stable.    4/24/2023: Denies having dizziness.  Denies any major falls.  No major bleeding.  She is in controlled atrial fibrillation her heart rate is in the 70s.  Her blood pressure is very well controlled.  Denies major change in volume.  Denies PND orthopnea.    10/27/2023: She is wearing her CPAP.  She denies major palpitations.  Denies lower extremity swelling we reviewed her echocardiogram    Recent visit: Still feels some fatigue. BP controlled. She wears the cpap at night    Patient Active Problem List   Diagnosis    Benign essential hypertension    Chronic diastolic (congestive) heart failure (HCC)    Hypothyroidism    Arthropathy of knee    Hallux abductovalgus with bunions, unspecified laterality    CKD (chronic kidney disease), stage III (HCC)    Diverticulitis of colon    Chronic gout without tophus    Hiatal hernia     Hyperlipemia    Hyperuricemia    WENDI (obstructive sleep apnea)    Pseudogout of left wrist    Gastroesophageal reflux disease without esophagitis    Peripheral arteriosclerosis (HCC)    Prediabetes    Status post right frontotemporal replacement cranioplasty    Sciatica of left side    Spinal stenosis of lumbar region with neurogenic claudication    Cervical radiculopathy    Persistent atrial fibrillation (HCC)    Hypomagnesemia    Other insomnia    Hypertensive heart and chronic kidney disease with heart failure and stage 1 through stage 4 chronic kidney disease, or chronic kidney disease (HCC)    Secondary hyperparathyroidism (HCC)     Past Medical History:   Diagnosis Date    Anxiety     Arthritis     joints    Bunion     Cataract     Disease of thyroid gland     Eczema     GERD (gastroesophageal reflux disease)     Gout     Heart failure (HCC)     Hepatitis     Hiatal hernia     Hypertension     Intentional overdose (HCC) 03/22/2024    Nephrolithiasis 06/21/2017    Onychomycosis     last assessed: 4/29/16    Orthopnea     resolved: 1/8/16    Pseudogout of left wrist     Sleep apnea     wears CPAP    SOB (shortness of breath) on exertion 10/26/2018    Stroke ([facility])      Social History     Socioeconomic History    Marital status:      Spouse name: Not on file    Number of children: Not on file    Years of education: Not on file    Highest education level: Not on file   Occupational History    Not on file   Tobacco Use    Smoking status: Never    Smokeless tobacco: Never   Vaping Use    Vaping status: Never Used   Substance and Sexual Activity    Alcohol use: Yes     Comment: Only on special occasions    Drug use: Never    Sexual activity: Not Currently     Partners: Male     Birth control/protection: Post-menopausal   Other Topics Concern    Not on file   Social History Narrative    Daily coffee consumption    Lack of exercise    Sleeps 6-7 hours a day     Social Drivers of Health     Financial Resource  Strain: Low Risk  (2023)    Overall Financial Resource Strain (CARDIA)     Difficulty of Paying Living Expenses: Not hard at all   Food Insecurity: No Food Insecurity (2025)    Nursing - Inadequate Food Risk Classification     Worried About Running Out of Food in the Last Year: Never true     Ran Out of Food in the Last Year: Never true     Ran Out of Food in the Last Year: Never true   Transportation Needs: No Transportation Needs (2025)    Nursing - Transportation Risk Classification     Lack of Transportation: Not on file     Lack of Transportation: No   Physical Activity: Not on file   Stress: Not on file   Social Connections: Not on file   Intimate Partner Violence: Unknown (2025)    Nursing IPS     Feels Physically and Emotionally Safe: Not on file     Physically Hurt by Someone: Not on file     Humiliated or Emotionally Abused by Someone: Not on file     Physically Hurt by Someone: No     Hurt or Threatened by Someone: No   Housing Stability: Unknown (2025)    Nursing: Inadequate Housing Risk Classification     Has Housing: Not on file     Worried About Losing Housing: Not on file     Unable to Get Utilities: Not on file     Unable to Pay for Housing in the Last Year: No     Has Housin      Family History   Problem Relation Age of Onset    Diabetes Mother     Coronary artery disease Mother     Arthritis Mother     Hypertension Mother     Hypertension Father     Diabetes Brother     Diabetes Son     Arthritis Family      Past Surgical History:   Procedure Laterality Date    ABDOMINAL SURGERY          BRAIN HEMATOMA EVACUATION Right 2020    Procedure: Right decompressive craniectomy and evacuation of intraparenchymal hematoma;  Surgeon: Apolinar Herrera MD;  Location: [facility];  Service: Neurosurgery    BREAST SURGERY Right     cyst removal    CARPAL TUNNEL RELEASE Left     CARPAL TUNNEL RELEASE Right     CATARACT EXTRACTION       SECTION  1960    x1     COLONOSCOPY N/A 2/5/2018    Procedure: COLONOSCOPY;  Surgeon: Alejandro Carlson MD;  Location: [facility] GI LAB;  Service: Gastroenterology    DILATION AND CURETTAGE OF UTERUS  1967    EYE SURGERY Left 2006    cataracts    HERNIA REPAIR      umbilical hernia    INCISIONAL HERNIA REPAIR      incarcerated    KNEE ARTHROSCOPY Right     MT RPLCMT BONE FLAP/PROSTHETIC PLATE SKULL Right 4/6/2020    Procedure: right frontotemporal replacement cranioplasty;  Surgeon: Apolinar Herrera MD;  Location:  MAIN OR;  Service: Neurosurgery    MT XCAPSL CTRC RMVL INSJ IO LENS PROSTH W/O ECP Right 3/27/2017    Procedure: EXTRACTION EXTRACAPSULAR CATARACT PHACO INTRAOCULAR LENS (IOL);  Surgeon: Trip Gilbert MD;  Location: [facility] MAIN OR;  Service: Ophthalmology       Current Outpatient Medications:     apixaban (Eliquis) 2.5 mg, TAKE 1 TABLET TWICE DAILY, Disp: 180 tablet, Rfl: 1    atorvastatin (LIPITOR) 40 mg tablet, Take 1 tablet (40 mg total) by mouth every evening, Disp: 90 tablet, Rfl: 1    bumetanide (BUMEX) 1 mg tablet, Take 1 tablet (1 mg total) by mouth daily, Disp: 90 tablet, Rfl: 3    Cholecalciferol (Vitamin D3) 50 MCG CAPS, Take by mouth in the morning, Disp: , Rfl:     Diclofenac Sodium (VOLTAREN) 1 %, Apply 2 g topically in the morning APPLY NO MORE THAN 3 DAYS IN A ROW THEN TAKE A BREAK FOR ONE WEEK FROM USE., Disp: 100 g, Rfl: 1    Empagliflozin (Jardiance) 10 MG TABS tablet, Take 1 tablet (10 mg total) by mouth every morning, Disp: 90 tablet, Rfl: 1    hydrOXYzine HCL (ATARAX) 10 mg tablet, Take 1 tablet (10 mg total) by mouth daily at bedtime as needed (insomnia), Disp: 90 tablet, Rfl: 1    levothyroxine 88 mcg tablet, Take 1 tablet (88 mcg total) by mouth daily, Disp: 90 tablet, Rfl: 1    metoprolol tartrate (LOPRESSOR) 25 mg tablet, Take 0.5 tablets (12.5 mg total) by mouth every 12 (twelve) hours, Disp: 90 tablet, Rfl: 1    pantoprazole (PROTONIX) 20 mg tablet, Take 1 tablet (20 mg total) by mouth daily, Disp: 90  tablet, Rfl: 1    spironolactone (ALDACTONE) 25 mg tablet, TAKE 1/2 TABLET EVERY DAY, Disp: 45 tablet, Rfl: 1    clotrimazole-betamethasone (LOTRISONE) 1-0.05 % cream, Apply topically 2 (two) times a day for 25 days, Disp: 45 g, Rfl: 1    losartan (COZAAR) 25 mg tablet, Take 2 tablets (50 mg total) by mouth daily, Disp: 60 tablet, Rfl: 2    Magnesium 400 MG CAPS, Take by mouth in the morning Take 500 mg. BID, Disp: , Rfl:     Current Facility-Administered Medications:     triamcinolone acetonide (KENALOG-40) 40 mg/mL injection 20 mg, 20 mg, Infiltration, , , 20 mg at 10/03/24 1045    triamcinolone acetonide (Kenalog-40) 40 mg/mL injection 20 mg, 20 mg, Intra-articular, , , 20 mg at 12/12/24 1115  No Known Allergies    Review of Systems:  Review of Systems   Constitutional:  Positive for fatigue. Negative for activity change, appetite change, chills, diaphoresis, fever and unexpected weight change.   HENT: Negative.  Negative for congestion, dental problem, drooling, ear discharge, ear pain, facial swelling, hearing loss, mouth sores, nosebleeds, postnasal drip, rhinorrhea, sinus pressure, sinus pain, sneezing, sore throat, tinnitus, trouble swallowing and voice change.    Eyes: Negative.  Negative for photophobia, pain, redness, itching and visual disturbance.   Respiratory:  Positive for shortness of breath. Negative for apnea, cough, choking, chest tightness, wheezing and stridor.    Cardiovascular:  Positive for leg swelling. Negative for chest pain and palpitations.   Gastrointestinal: Negative.  Negative for abdominal distention, abdominal pain, anal bleeding, blood in stool, constipation, diarrhea, nausea, rectal pain and vomiting.   Endocrine: Negative.  Negative for cold intolerance, heat intolerance, polydipsia, polyphagia and polyuria.   Genitourinary: Negative.  Negative for decreased urine volume, difficulty urinating, dyspareunia, dysuria, enuresis, flank pain, frequency, genital sores, hematuria,  menstrual problem, pelvic pain, urgency, vaginal bleeding, vaginal discharge and vaginal pain.   Musculoskeletal:  Positive for back pain. Negative for arthralgias, gait problem, joint swelling, myalgias, neck pain and neck stiffness.   Skin: Negative.  Negative for color change, pallor, rash and wound.   Allergic/Immunologic: Negative.  Negative for environmental allergies, food allergies and immunocompromised state.   Neurological: Negative.  Negative for dizziness, tremors, seizures, syncope, facial asymmetry, speech difficulty, weakness, light-headedness, numbness and headaches.   Hematological:  Negative for adenopathy. Bruises/bleeds easily.   Psychiatric/Behavioral: Negative.  Negative for agitation, behavioral problems, confusion, decreased concentration, dysphoric mood, hallucinations, self-injury, sleep disturbance and suicidal ideas. The patient is not nervous/anxious and is not hyperactive.    All other systems reviewed and are negative.      Vitals:    03/06/25 0951   BP: 128/62   BP Location: Right arm   Patient Position: Sitting   Cuff Size: Standard   Pulse: 78   SpO2: 100%   Weight: 65.3 kg (144 lb)   Height: 5' (1.524 m)     Physical Exam:  Physical Exam  Vitals and nursing note reviewed.   Constitutional:       General: She is not in acute distress.     Appearance: She is well-developed. She is ill-appearing. She is not diaphoretic.   HENT:      Head: Normocephalic and atraumatic.      Right Ear: External ear normal.      Left Ear: External ear normal.   Eyes:      General: No scleral icterus.        Right eye: No discharge.         Left eye: No discharge.      Conjunctiva/sclera: Conjunctivae normal.      Pupils: Pupils are equal, round, and reactive to light.   Neck:      Thyroid: No thyromegaly.      Vascular: No JVD.      Trachea: No tracheal deviation.   Cardiovascular:      Rate and Rhythm: Normal rate. Rhythm irregular.      Heart sounds: Murmur heard.      No friction rub. Gallop present.    Pulmonary:      Effort: Pulmonary effort is normal. No respiratory distress.      Breath sounds: Normal breath sounds. No stridor. No wheezing or rales.   Chest:      Chest wall: No tenderness.   Abdominal:      General: Bowel sounds are normal. There is no distension.      Palpations: Abdomen is soft. There is no mass.      Tenderness: There is no abdominal tenderness. There is no guarding or rebound.   Musculoskeletal:         General: Swelling present. No tenderness or deformity. Normal range of motion.      Cervical back: Normal range of motion and neck supple.   Skin:     General: Skin is warm and dry.      Coloration: Skin is not pale.      Findings: Bruising and lesion present. No erythema or rash.   Neurological:      Mental Status: She is alert and oriented to person, place, and time.      Cranial Nerves: No cranial nerve deficit.      Motor: No abnormal muscle tone.      Coordination: Coordination normal.      Deep Tendon Reflexes: Reflexes are normal and symmetric.   Psychiatric:         Behavior: Behavior normal.         Thought Content: Thought content normal.         Judgment: Judgment normal.         Labs:     Lab Results   Component Value Date    WBC 7.97 02/03/2025    HGB 12.2 02/03/2025    HCT 37.9 02/03/2025    MCV 87 02/03/2025     02/03/2025     Lab Results   Component Value Date    K 3.9 02/04/2025     02/04/2025    CO2 25 02/04/2025    BUN 28 (H) 02/04/2025    CREATININE 1.18 02/04/2025    GLUCOSE 124 02/11/2020    GLUF 86 02/01/2025    CALCIUM 9.2 02/04/2025    CORRECTEDCA 10.1 06/29/2023    AST 33 01/31/2025    ALT 20 01/31/2025    ALKPHOS 101 01/31/2025    EGFR 42 02/04/2025     Lab Results   Component Value Date    CHOL 191 12/27/2013    CHOL 218 09/21/2013     Lab Results   Component Value Date    HDL 56 01/03/2025    HDL 49 (L) 07/03/2024    HDL 54 03/27/2024     Lab Results   Component Value Date    LDLCALC 45 01/03/2025    LDLCALC 35 07/03/2024    LDLCALC 35 03/27/2024      Lab Results   Component Value Date    TRIG 72 2025    TRIG 89 2024    TRIG 78 2024     Lab Results   Component Value Date    HGBA1C 5.8 (H) 2025       Imaging & Testing   I have personally reviewed pertinent reports.      EKG: Personally reviewed.    Normal sinus rhythm no acute st/t wave    Cardiac testing:   Results for orders placed during the hospital encounter of 01/15/16   Echo complete with contrast if indicated    Narrative [place] [phone]    Transthoracic Echocardiogram  2D, M-mode, Doppler, and Color Doppler    Study date:  15-Berny-2016    Patient: JOBY SERVIN  MR number: [id number]  Account number: [id number]  : 1940  Age: 75 years  Gender: Female  Status: Routine  Location: Echo lab  Height: 61 in  Weight: 214.5 lb  BP: 132/ 84 mmHg    Indications: HTN    Diagnoses: 401.9 - HYPERTENSION NOS    Sonographer:  COLT Amado  Primary Physician:  Jose Ramon Meng  Referring Physician:  ROQUE WHITING  Group:  [facility] [place]  Interpreting Physician:  Roque Whiting DO    SUMMARY    LEFT VENTRICLE:  Systolic function was at the lower limits of normal. Ejection fraction was  estimated in the range of 50 % to 55 %.  There were no regional wall motion abnormalities.  There was moderate concentric hypertrophy.  Features were consistent with a pseudonormal left ventricular filling pattern,  with concomitant abnormal relaxation and increased filling pressure (grade 2  diastolic dysfunction). Doppler parameters were consistent with elevated mean  left atrial filling pressure.    LEFT ATRIUM:  The atrium was moderately dilated.    RIGHT ATRIUM:  The atrium was markedly dilated.    MITRAL VALVE:  There was marked annular calcification.  There was moderate regurgitation.    TRICUSPID VALVE:  There was mild regurgitation.  Pulmonary artery systolic pressure was mildly increased.  Estimated peak PA pressure  was 46 mmHg.    HISTORY: PRIOR HISTORY: Patient has no history of cardiovascular disease.    PROCEDURE: The procedure was performed in the echo lab. This was a routine  study. The transthoracic approach was used. The study included complete 2D  imaging, M-mode, complete spectral Doppler, and color Doppler. The heart rate  was 77 bpm, at the start of the study. Echocardiographic views were limited due  to poor acoustic window availability and decreased penetration. This was a  technically difficult study.    LEFT VENTRICLE: Size was normal. Systolic function was at the lower limits of  normal. Ejection fraction was estimated in the range of 50 % to 55 %. There  were no regional wall motion abnormalities. There was moderate concentric  hypertrophy. DOPPLER: Features were consistent with a pseudonormal left  ventricular filling pattern, with concomitant abnormal relaxation and increased  filling pressure (grade 2 diastolic dysfunction). Doppler parameters were  consistent with elevated mean left atrial filling pressure.    RIGHT VENTRICLE: The size was normal. Systolic function was normal. DOPPLER:  Systolic pressure was within the normal range.    LEFT ATRIUM: The atrium was moderately dilated. No thrombus was identified.    RIGHT ATRIUM: The atrium was markedly dilated.    MITRAL VALVE: There was marked annular calcification. Valve structure was  normal. There was normal leaflet separation. No echocardiographic evidence for  prolapse. DOPPLER: The transmitral velocity was within the normal range. There  was no evidence for stenosis. There was moderate regurgitation.    AORTIC VALVE: The valve was trileaflet. Leaflets exhibited normal thickness,  normal cuspal separation, and sclerosis. DOPPLER: Transaortic velocity was  within the normal range. There was no evidence for stenosis. There was no  regurgitation.    TRICUSPID VALVE: The valve structure was normal. There was normal leaflet  separation. DOPPLER: The  "transtricuspid velocity was within the normal range.  There was mild regurgitation. Pulmonary artery systolic pressure was mildly  increased. Estimated peak PA pressure was 46 mmHg.    PULMONIC VALVE: Leaflets exhibited normal thickness, no calcification, and  normal cuspal separation. DOPPLER: The transpulmonic velocity was within the  normal range. There was mild regurgitation.    PERICARDIUM: There was no thickening. There was no pericardial effusion.    AORTA: The root exhibited normal size.    PULMONARY ARTERY: The size was normal. The morphology appeared normal.    SYSTEM MEASUREMENT TABLES    2D mode  AoR Diam 2D: 2.8 cm  LA Diam (2D): 5.3 cm  LA/Ao (2D): 1.89  FS (2D Teich): 25.3 %  IVSd (2D): 1.44 cm  LVDEV: 98.3 cm³  LVESV: 49.1 cm³  LVIDd(2D): 4.62 cm  LVISd (2D): 3.45 cm  LVPWd (2D): 1.3 cm  SV (Teich): 49.2 cm³    Apical four chamber  LVEF A4C: 55 %    Unspecified Scan Mode  MV Peak A Jesse: 1110 mm/s  MV Peak E Jesse. Mean: 1400 mm/s  MVA (PHT): 3.55 cm squared  PHT: 58 ms  Max P mm[Hg]  V Max: 2990 mm/s  Vmax: 3050 mm/s  RA Area: 19.6 cm squared  RA Volume: 55.3 cm³  TAPSE: 1.9 cm    [facility] Accredited Echocardiography [facility]    Prepared and electronically signed by    Roque Whiting DO  Signed 2016 17:37:47       EKG atrial fibrillation 100 beats per minute  afib 81bmp rbbb nonspecific t-wave changes        Damon Yoon MD [facility]  Please call with any questions or suggestions    A description of the counselin minutes spent with family discussing about risk for long-term anticoagulation versus benefits  Goals and Barriers:  Patient's ability to self care:  Medication side effect reviewed with patient in detail and all their questions answered.    \"This note has been constructed using a voice recognition system.Therefore there may be syntax, spelling, and/or grammatical errors. Please call if you have any questions. \"    "

## 2025-03-07 ENCOUNTER — PATIENT OUTREACH (OUTPATIENT)
Dept: CASE MANAGEMENT | Facility: OTHER | Age: 85
End: 2025-03-07

## 2025-03-07 DIAGNOSIS — I50.33 ACUTE ON CHRONIC DIASTOLIC CONGESTIVE HEART FAILURE (HCC): ICD-10-CM

## 2025-03-07 RX ORDER — BUMETANIDE 1 MG/1
TABLET ORAL
Qty: 90 TABLET | Refills: 1 | Status: SHIPPED | OUTPATIENT
Start: 2025-03-07

## 2025-03-07 NOTE — PROGRESS NOTES
Outpatient Care Management Note:    Voice mail message left for Rosalind, with my contact information, requesting a call back to follow up after her cardiology appt.    Patient has been out of the hospital >30 days.  Heart failure episode closed.     Call received from Rosalind. She states that cardiology did not make any adjustments. Her weights continue to be stable at 145 lbs. She denies any increased swelling or shortness of breath. She knows to call cardiology if she gains by 3/5 pound rule. She will continue to monitor her fluids and salt intake.     Rosalind denies any questions. CM will close the referral.

## 2025-04-01 ENCOUNTER — DOCUMENTATION (OUTPATIENT)
Dept: NEPHROLOGY | Facility: CLINIC | Age: 85
End: 2025-04-01

## 2025-04-03 ENCOUNTER — APPOINTMENT (OUTPATIENT)
Dept: LAB | Facility: CLINIC | Age: 85
End: 2025-04-03
Payer: MEDICARE

## 2025-04-03 DIAGNOSIS — I10 BENIGN ESSENTIAL HYPERTENSION: ICD-10-CM

## 2025-04-03 DIAGNOSIS — I48.19 PERSISTENT ATRIAL FIBRILLATION (HCC): ICD-10-CM

## 2025-04-03 DIAGNOSIS — R73.03 PREDIABETES: ICD-10-CM

## 2025-04-03 DIAGNOSIS — N18.30 STAGE 3 CHRONIC KIDNEY DISEASE, UNSPECIFIED WHETHER STAGE 3A OR 3B CKD (HCC): ICD-10-CM

## 2025-04-03 DIAGNOSIS — I50.32 CHRONIC DIASTOLIC (CONGESTIVE) HEART FAILURE (HCC): ICD-10-CM

## 2025-04-03 DIAGNOSIS — E79.0 HYPERURICEMIA: ICD-10-CM

## 2025-04-03 LAB
ALBUMIN SERPL BCG-MCNC: 4 G/DL (ref 3.5–5)
ANION GAP SERPL CALCULATED.3IONS-SCNC: 9 MMOL/L (ref 4–13)
BUN SERPL-MCNC: 45 MG/DL (ref 5–25)
CALCIUM SERPL-MCNC: 9.6 MG/DL (ref 8.4–10.2)
CHLORIDE SERPL-SCNC: 103 MMOL/L (ref 96–108)
CO2 SERPL-SCNC: 31 MMOL/L (ref 21–32)
CREAT SERPL-MCNC: 1.3 MG/DL (ref 0.6–1.3)
GFR SERPL CREATININE-BSD FRML MDRD: 37 ML/MIN/1.73SQ M
GLUCOSE P FAST SERPL-MCNC: 90 MG/DL (ref 65–99)
PHOSPHATE SERPL-MCNC: 3.2 MG/DL (ref 2.3–4.1)
POTASSIUM SERPL-SCNC: 4 MMOL/L (ref 3.5–5.3)
SODIUM SERPL-SCNC: 143 MMOL/L (ref 135–147)

## 2025-04-07 ENCOUNTER — OFFICE VISIT (OUTPATIENT)
Dept: NEPHROLOGY | Facility: CLINIC | Age: 85
End: 2025-04-07
Payer: MEDICARE

## 2025-04-07 VITALS
OXYGEN SATURATION: 100 % | DIASTOLIC BLOOD PRESSURE: 68 MMHG | SYSTOLIC BLOOD PRESSURE: 136 MMHG | BODY MASS INDEX: 29.25 KG/M2 | HEART RATE: 93 BPM | HEIGHT: 60 IN | WEIGHT: 149 LBS

## 2025-04-07 DIAGNOSIS — N18.30 STAGE 3 CHRONIC KIDNEY DISEASE, UNSPECIFIED WHETHER STAGE 3A OR 3B CKD (HCC): Primary | ICD-10-CM

## 2025-04-07 DIAGNOSIS — I10 BENIGN ESSENTIAL HTN: ICD-10-CM

## 2025-04-07 DIAGNOSIS — E83.42 HYPOMAGNESEMIA: ICD-10-CM

## 2025-04-07 DIAGNOSIS — I10 BENIGN ESSENTIAL HYPERTENSION: ICD-10-CM

## 2025-04-07 DIAGNOSIS — N25.81 SECONDARY HYPERPARATHYROIDISM (HCC): ICD-10-CM

## 2025-04-07 PROCEDURE — 99214 OFFICE O/P EST MOD 30 MIN: CPT | Performed by: INTERNAL MEDICINE

## 2025-04-07 RX ORDER — LOSARTAN POTASSIUM 25 MG/1
25 TABLET ORAL DAILY
Start: 2025-04-07

## 2025-04-07 NOTE — ASSESSMENT & PLAN NOTE
Lab Results   Component Value Date    EGFR 37 04/03/2025    EGFR 42 02/04/2025    EGFR 47 02/03/2025    CREATININE 1.30 04/03/2025    CREATININE 1.18 02/04/2025    CREATININE 1.07 02/03/2025 April 7, 2025-patient seen in renal office.  Since last being seen, she was admitted in the hospital January 31 to February 4 for diastolic heart failure exacerbation requiring diuresis.  Was ultimately placed back on Bumex 1 mg daily.  Posthospitalization she saw her cardiologist on February 6, 2025 and was noted to have marginal blood pressures and losartan was decreased to 25 mg daily.  Most recent lab work reviewed.  Creatinine 1.3 mg/dL which is at baseline.  Sodium, potassium, bicarbonate, phosphorus are appropriate.  Last urinalysis was in January with 4-10 WBC.  Protein creatinine ratio-none recent and we will recheck with next set of lab work but microalbumin to creatinine ratio was appropriate at 7 mg/g in January.    Overall from a renal standpoint, patient's renal function is stable and at baseline.  From a volume standpoint she appears euvolemic at Bumex 1 mg daily.  Her blood pressures are above goal.  Initially 136 systolic.  Then when we repeated it was 150 systolic but at this juncture she was stressed and tearful when we were discussing how she is doing from mental health standpoint.  I advised that her blood pressures are above goal.  I also advised that she would benefit from seeing a therapist but for now she declines this.  Her daughter was present for this discussion.  From a blood pressure standpoint she believes she is on 25 mg once daily.  The medication list they had from home stated 25 mg tablet 1 tablet take 2 a day but she was doing 1 tablet once a day which is what the patient's cardiologist had recommended at 2 recent visits.  She will confirm the dose and let us know.  We have also advised her to increase losartan to twice a day if it is at 25 mg daily if her pressures stay above 140 systolic  at home and she is agreeable.        Plan:     -No changes to antihypertensive regimen today But BP is above goal and on repeat is elevated also 150 syst but is stressed currently. Check BP once daily at home and will increase losartan to 25 mg twice daily if needed for blood pressures above 140 systolic  - Daughter will send current losartan dose to me on MyChart  - Continue holding amlodipine for now  - Continue spironolactone 12.5 mg daily  - Continue losartan 25 mg daily  - Continue Bumex 1 mg once a day  - Lab work in 3 months  - appt in 4-5 months    Orders:    Albumin / creatinine urine ratio; Future    Basic metabolic panel; Future    CBC; Future    Magnesium; Future    Phosphorus; Future    Protein / creatinine ratio, urine; Future    PTH, intact; Future    Urinalysis with microscopic; Future    Vitamin D 25 hydroxy; Future

## 2025-04-07 NOTE — PROGRESS NOTES
Name: Rosalind Wise      : 1940      MRN: [id number]  Encounter Provider: Ele Miller MD  Encounter Date: 2025   Encounter department: [facility] [place]  :  Assessment & Plan  Stage 3 chronic kidney disease, unspecified whether stage 3a or 3b CKD ([facility])  Lab Results   Component Value Date    EGFR 37 2025    EGFR 42 2025    EGFR 47 2025    CREATININE 1.30 2025    CREATININE 1.18 2025    CREATININE 1.07 2025-patient seen in renal office.  Since last being seen, she was admitted in the hospital  to  for diastolic heart failure exacerbation requiring diuresis.  Was ultimately placed back on Bumex 1 mg daily.  Posthospitalization she saw her cardiologist on 2025 and was noted to have marginal blood pressures and losartan was decreased to 25 mg daily.  Most recent lab work reviewed.  Creatinine 1.3 mg/dL which is at baseline.  Sodium, potassium, bicarbonate, phosphorus are appropriate.  Last urinalysis was in January with 4-10 WBC.  Protein creatinine ratio-none recent and we will recheck with next set of lab work but microalbumin to creatinine ratio was appropriate at 7 mg/g in January.    Overall from a renal standpoint, patient's renal function is stable and at baseline.  From a volume standpoint she appears euvolemic at Bumex 1 mg daily.  Her blood pressures are above goal.  Initially 136 systolic.  Then when we repeated it was 150 systolic but at this juncture she was stressed and tearful when we were discussing how she is doing from mental health standpoint.  I advised that her blood pressures are above goal.  I also advised that she would benefit from seeing a therapist but for now she declines this.  Her daughter was present for this discussion.  From a blood pressure standpoint she believes she is on 25 mg once daily.  The medication list they had from home stated 25 mg tablet 1 tablet  take 2 a day but she was doing 1 tablet once a day which is what the patient's cardiologist had recommended at 2 recent visits.  She will confirm the dose and let us know.  We have also advised her to increase losartan to twice a day if it is at 25 mg daily if her pressures stay above 140 systolic at home and she is agreeable.        Plan:     -No changes to antihypertensive regimen today But BP is above goal and on repeat is elevated also 150 syst but is stressed currently. Check BP once daily at home and will increase losartan to 25 mg twice daily if needed for blood pressures above 140 systolic  - Daughter will send current losartan dose to me on MyChart  - Continue holding amlodipine for now  - Continue spironolactone 12.5 mg daily  - Continue losartan 25 mg daily  - Continue Bumex 1 mg once a day  - Lab work in 3 months  - appt in 4-5 months    Orders:    Albumin / creatinine urine ratio; Future    Basic metabolic panel; Future    CBC; Future    Magnesium; Future    Phosphorus; Future    Protein / creatinine ratio, urine; Future    PTH, intact; Future    Urinalysis with microscopic; Future    Vitamin D 25 hydroxy; Future    Benign essential HTN  Above goal in the office.  Patient will start checking blood pressure log at home.  See parameters given in AVS summary       Hypomagnesemia  Most recent appropriate and we will repeat       Secondary hyperparathyroidism (HCC)    Orders:    PTH, intact; Future    Vitamin D 25 hydroxy; Future    Benign essential hypertension    Orders:    losartan (COZAAR) 25 mg tablet; Take 1 tablet (25 mg total) by mouth daily        History of Present Illness   HPI  Rosalind Wise is a 84 y.o. female for follow visit for CKD.  Patient denies complaints.  States physically she feels very well.  No fevers, chills, nausea, vomiting, diarrhea.  from a mental health perspective, she still feels very stressed.  She feels that she is a burden on her family.  She is no longer seeing a  therapist.  She does not wish to.  History obtained from: patient    Review of Systems   Constitutional: Negative.  Negative for fatigue.   HENT: Negative.     Eyes: Negative.    Respiratory: Negative.  Negative for shortness of breath.    Cardiovascular: Negative.  Negative for leg swelling.   Gastrointestinal: Negative.    Endocrine: Negative.    Genitourinary: Negative.  Negative for difficulty urinating.   Musculoskeletal: Negative.    Skin: Negative.    Allergic/Immunologic: Negative.    Neurological: Negative.    Hematological: Negative.    Psychiatric/Behavioral: Negative.     All other systems reviewed and are negative.    Pertinent Medical History   2/2020 - February with 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 on February 11th.  Completed course of Keppra for seizure prophylaxis.     3/30/2020 - losartan was increased to 50 mg twice a day from 50 in the morning and 25 in the evening due to high blood pressures     4/3/2020 - spironolactone 12.5 mg daily was started due to continued high blood pressure     4/2020 - patient had presented for replacement current of cranioplasty on April 6.  And subsequently was admitted to the rehab center postoperatively      March 2021- patient was admitted to hospital with not feeling well and   Headache.  CT scan of the head with no acute intracranial pathology. The other symptoms of memory loss were also present.  Patient was referred to geriatrician.     April 2021 - sciatic pain. Received brief steroid course     5/2021 - back pain. Went to ER. Given pred taper     5/2023 - neck pain.CT of spine -  no cervical spine fracture or traumatic malalignmen      October 2023-patient was in the ER for dizziness and headache.  Patient was given normal saline.  Had a CAT scan completed.  CT of the head no acute intracranial abnormalities with unchanged large chronic encephalomalacia and gliosis in the right temporal lobe in the region  of remote hemorrhage.  Chest x-ray was unrevealing.  Patient was discharged after volume expansion and feeling improved.     October 27, 2023-patient to follow-up with cardiology team.  For now mitral valve is being monitored. CT abdpel - no acute findings. Potential gastritis     March 26, 2024-patient had intentional overdose of prescribed medications and was admitted to the hospital.  Had ingested Zofran, Atarax, Plavix, Benadryl, Tylenol cold and flu tablets.  Received N-acetylcysteine.  Had signs of anticholinergic effects.  With regards to patient's hypertension, metoprolol was increased to 25 mg twice daily especially given that the patient was in A-fib with RVR requiring Cardizem drip.  Patient was admitted after discharge from medicine team to the psychiatric team for further mental health care.  Patient was started on Lexapro.  Was discharged in stable and improving condition..    October 2024-patient does in the ER for headaches and depression evaluation.  Was seen by crisis team.  Was cleared for discharge.    October 2024-patient was seen by nephrology advanced practitioner for follow-up.  Renal function was stable.    January 2025-patient was admitted to the hospital-with acute on chronic diastolic heart failure exacerbation.  Received IV Lasix.  Was transition back to Bumex on discharge 1 mg daily.     83-year-old female with a past medical history of chronic kidney disease, venous stasis, HTN, hypothyroidism, lumbar canal stenosis, Anxiety, GERD, WENDI, neuropathy, Gout, EF 50-55%, Grade 2 diastolic dysfunction who presents for follow up for CKD.      1) CKD III - patient also has a long standing history of HTN. Creatinine in 2013, 0.9 mg/dL. Cr early 2016 was 0.80-0.9 mg/dL; then increased starting in august 2016 to 2.3 mg/dL and has fluctuated since. CT scan in 2016, kidneys appearing normal at that time.      Etiology of CKD may be long standing HTN and ATN. Baseline Cr ~ 1.1 -1.3 mg/dL     - Urine  eos 0%;   - A1c controlled prior  at 5.6% 12/2022  - UPCR too low to quant (6/2023)  - UA bland 6/2023  -SPEP unrevealing  -UPEP unrevealing  - C3, C4 unrevealing  - Renal u/s with R 10.3 cm, L 10.4 cm, renal cortex 1 cm b/l; both kidneys slightly reduced in size; lower pole of R kidney is non obstructing calculus  - 10/2023 - CT without hydro on kidney  - Pt follows with Urology team for nephrolithiasis.   - No NSAIDs, the patient is not currently taking NSAID  -nuc stress test per Cardiology in Jan 2019 unrevealing  - repeat renal u/s 1/2019 - unrevealing with exception of renal cortical atrophy; but also 6 mm non shadowing calculus.  - SGLT2 inhibitor-added January 2025 by cardiology team    April 7, 2025-patient seen in renal office.  Since last being seen, she was admitted in the hospital January 31 to February 4 for diastolic heart failure exacerbation requiring diuresis.  Was ultimately placed back on Bumex 1 mg daily.  Posthospitalization she saw her cardiologist on February 6, 2025 and was noted to have marginal blood pressures and losartan was decreased to 25 mg daily.  Most recent lab work reviewed.  Creatinine 1.3 mg/dL which is at baseline.  Sodium, potassium, bicarbonate, phosphorus are appropriate.  Last urinalysis was in January with 4-10 WBC.  Protein creatinine ratio-none recent and we will recheck with next set of lab work but microalbumin to creatinine ratio was appropriate at 7 mg/g in January.    Overall from a renal standpoint, patient's renal function is stable and at baseline.  From a volume standpoint she appears euvolemic at Bumex 1 mg daily.  Her blood pressures are above goal.  Initially 136 systolic.  Then when we repeated it was 150 systolic but at this juncture she was stressed and tearful when we were discussing how she is doing from mental health standpoint.  I advised that her blood pressures are above goal.  I also advised that she would benefit from seeing a therapist but for now  she declines this.  Her daughter was present for this discussion.  From a blood pressure standpoint she believes she is on 25 mg once daily.  The medication list they had from home stated 25 mg tablet 1 tablet take 2 a day but she was doing 1 tablet once a day which is what the patient's cardiologist had recommended at 2 recent visits.  She will confirm the dose and let us know.  We have also advised her to increase losartan to twice a day if it is at 25 mg daily if her pressures stay above 140 systolic at home and she is agreeable.        Plan:     -No changes to antihypertensive regimen today But BP is above goal and on repeat is elevated also 150 syst but is stressed currently. Check BP once daily at home and will increase losartan to 25 mg twice daily if needed for blood pressures above 140 systolic  - Daughter will send current losartan dose to me on MyChart  - Continue holding amlodipine for now  - Continue spironolactone 12.5 mg daily  - Continue losartan 25 mg daily  - Continue Bumex 1 mg once a day  - Lab work in 3 months  - appt in 4-5 months     2) SOB - Volume - follows with Cardiology-     - on Bumex.  lowered slightly to 1 mg once a day on December 15, 2023 as the patient may be becoming hypovolemic and patient is euvolemic.  - Follows with cardiology for diastolic heart failure and last seen July 2024  - Was advised to continue Bumex 1 mg daily  - Hospitalization January 31 to February 4 with diastolic heart failure exacerbation.  Required IV Lasix.  Weight on February 4 was 62.1 kg - 137 pounds  - Weight was stable 144 -145 pounds February 17, 2025 per cardiology notes  - From April 7, 2025-weight is stable.  Relatively euvolemic with trace edema lower extremities.  No changes to Bumex     3) HTN -      - cont losartan, bumex, spironolactone, metoprolol  -  due to bradycardia, beta-blocker was held prior but given new onset atrial fibrillation around May 2022, patient was restarted on metoprolol per  Cardiology team  - December 2023-lower Bumex to 1 mg once daily due to concern for hypovolemia  - March 2024-it appears that the patient's amlodipine was held in the hospital.  And patient's metoprolol was increased.  Blood pressures are appropriate April 9, 2024 at appointment.  - 6/2024 -  syst. States was stressed coming to office.  Repeat blood pressures still 130 systolic in the office.  She states at home and her daughter confirms that blood pressures are mainly 1 16-1 20s systolic.  Therefore no changes for now but gave parameters for the patient to call us if they rise consistently above 135 systolic  - February 6, 2025-losartan reduced by cardiologist to 25 mg daily due to marginal blood pressures  - April 7, 2025-no changes to regimen today but may need to increase losartan.  Was hypertensive in the office.  Patient will check log at home and send in blood pressure log and readings     4) hypothyroid - as per Primary Care Physician     5) HPL - Primary Care following.      6) Electrolytes - stable     7) MBD -     - Check PTH next set of lab work     8) gout -      -monitor for flare.  No sign of flare currently     9) back pain - follows with Pain management     - pain sig improved with inj     10) Colonoscopy in feb with internal hemorrhoids and polyp removed.      11) alk phos elevation     -improved     12) Anemia - Hb stable     -hemoglobin stable and at goal     13) Mitral valve calcification     - moderate to severe mitral regurgitation-stable on repeat echocardiogram April 2024 per cardiology team  -eventually may require further evaluation.  Being monitored for now conservatively.  - per Cardiology team     14) elevated D-dimer prior-patient was given duplex of lower extremities which was unrevealing prior     15) knee pain after fall in august 2020     - saw Orthopedic team  - steroid injection was given     16) intracranial hemorrhage with mass effect requiring craniotomy on 2/11/2020      - now is status post replacement of cranioplasty in April 2020     17) mental health-suicide attempt 2024, anxiety-  management per mental health teams and primary care physician.  Had recent ER visit where crisis was called also but she was cleared for discharge.  Currently she states that the stressors do cause her to feel sad at times.  She declines referral to psychiatry or therapy.  She knows to call if she is willing and ready to see them.     18)   Zoster infection in November 2020-treated with Valtrex per primary team     19) cardiology- fib - onset in May 2022     -was advised to have Holter monitor.  There is concern for tachy-hola syndrome initially and Holter monitor showed periods of atrial fibrillation with RVR and nonsustained ventricular tachycardia.  Was started on low-dose beta-blocker with metoprolol.  -in July metoprolol was titrated further to 25 mg twice a day  -was started on Eliquis 2.5 mg twice a day.  Lower dose was chosen due to high risk factors.  - Patient is currently on metoprolol 12.5 milligrams twice daily.  - Patient saw cardiology July 2024 for [id number] continued on metoprolol 12.5 mg twice daily  - Patient saw cardiology for follow-up January 30, 2025-patient had gained 8 pounds compared to prior visit in July.  Was advised for increasing loop diuretic but patient preferred not to increase loop diuretic at that time.  Jardiance was added and was advised for nuclear scan.  - Nuclear scan completed February 2024 which was negative for reversible changes.           Medical History Reviewed by provider this encounter:     .  Past Medical History   Past Medical History:   Diagnosis Date    Anxiety     Arthritis     joints    Bunion     Cataract     Disease of thyroid gland     Eczema     GERD (gastroesophageal reflux disease)     Gout     Heart failure (HCC)     Hepatitis     Hiatal hernia     Hypertension     Intentional overdose (HCC) 03/22/2024    Nephrolithiasis 06/21/2017     Onychomycosis     last assessed: 16    Orthopnea     resolved: 16    Pseudogout of left wrist     Sleep apnea     wears CPAP    SOB (shortness of breath) on exertion 10/26/2018    Stroke (HCC)      Past Surgical History:   Procedure Laterality Date    ABDOMINAL SURGERY          BRAIN HEMATOMA EVACUATION Right 2020    Procedure: Right decompressive craniectomy and evacuation of intraparenchymal hematoma;  Surgeon: Apolinar Herrera MD;  Location: [facility];  Service: Neurosurgery    BREAST SURGERY Right     cyst removal    CARPAL TUNNEL RELEASE Left 1989    CARPAL TUNNEL RELEASE Right 2004    CATARACT EXTRACTION       SECTION  1960    x1    COLONOSCOPY N/A 2018    Procedure: COLONOSCOPY;  Surgeon: Alejandro Carlson MD;  Location: [facility];  Service: Gastroenterology    DILATION AND CURETTAGE OF UTERUS  1967    EYE SURGERY Left 2006    cataracts    HERNIA REPAIR      umbilical hernia    INCISIONAL HERNIA REPAIR      incarcerated    KNEE ARTHROSCOPY Right     CO RPLCMT BONE FLAP/PROSTHETIC PLATE SKULL Right 2020    Procedure: right frontotemporal replacement cranioplasty;  Surgeon: Apolinar Herrera MD;  Location: [facility];  Service: Neurosurgery    CO XCAPSL CTRC RMVL INSJ IO LENS PROSTH W/O ECP Right 3/27/2017    Procedure: EXTRACTION EXTRACAPSULAR CATARACT PHACO INTRAOCULAR LENS (IOL);  Surgeon: Trip Gilbert MD;  Location: [facility];  Service: Ophthalmology     Family History   Problem Relation Age of Onset    Diabetes Mother     Coronary artery disease Mother     Arthritis Mother     Hypertension Mother     Hypertension Father     Diabetes Brother     Diabetes Son     Arthritis Family       reports that she has never smoked. She has never used smokeless tobacco. She reports current alcohol use. She reports that she does not use drugs.  Current Outpatient Medications   Medication Instructions    atorvastatin (LIPITOR) 40 mg, Oral, Every evening    bumetanide (BUMEX)  1 mg tablet TAKE 1 TABLET EVERY DAY (NEW DOSE)    Cholecalciferol (Vitamin D3) 50 MCG CAPS Daily    clotrimazole-betamethasone (LOTRISONE) 1-0.05 % cream Topical, 2 times daily    Diclofenac Sodium (VOLTAREN) 2 g, Topical, Daily, APPLY NO MORE THAN 3 DAYS IN A ROW THEN TAKE A BREAK FOR ONE WEEK FROM USE.    Eliquis 2.5 mg, Oral, 2 times daily    Empagliflozin (JARDIANCE) 10 mg, Oral, Every morning    hydrOXYzine HCL (ATARAX) 10 mg, Oral, Daily at bedtime PRN    levothyroxine 88 mcg, Oral, Daily    losartan (COZAAR) 25 mg, Oral, Daily    Magnesium 400 MG CAPS Daily    metoprolol tartrate (LOPRESSOR) 12.5 mg, Oral, Every 12 hours    pantoprazole (PROTONIX) 20 mg, Oral, Daily    spironolactone (ALDACTONE) 12.5 mg, Oral, Daily   No Known Allergies   Current Outpatient Medications on File Prior to Visit   Medication Sig Dispense Refill    apixaban (Eliquis) 2.5 mg TAKE 1 TABLET TWICE DAILY 180 tablet 1    atorvastatin (LIPITOR) 40 mg tablet Take 1 tablet (40 mg total) by mouth every evening 90 tablet 1    bumetanide (BUMEX) 1 mg tablet TAKE 1 TABLET EVERY DAY (NEW DOSE) 90 tablet 1    Cholecalciferol (Vitamin D3) 50 MCG CAPS Take by mouth in the morning      Diclofenac Sodium (VOLTAREN) 1 % Apply 2 g topically in the morning APPLY NO MORE THAN 3 DAYS IN A ROW THEN TAKE A BREAK FOR ONE WEEK FROM USE. 100 g 1    Empagliflozin (Jardiance) 10 MG TABS tablet Take 1 tablet (10 mg total) by mouth every morning 90 tablet 1    hydrOXYzine HCL (ATARAX) 10 mg tablet Take 1 tablet (10 mg total) by mouth daily at bedtime as needed (insomnia) 90 tablet 1    levothyroxine 88 mcg tablet Take 1 tablet (88 mcg total) by mouth daily 90 tablet 1    Magnesium 400 MG CAPS Take by mouth in the morning Take 500 mg. BID      metoprolol tartrate (LOPRESSOR) 25 mg tablet Take 0.5 tablets (12.5 mg total) by mouth every 12 (twelve) hours 90 tablet 1    pantoprazole (PROTONIX) 20 mg tablet Take 1 tablet (20 mg total) by mouth daily 90 tablet 1     spironolactone (ALDACTONE) 25 mg tablet TAKE 1/2 TABLET EVERY DAY 45 tablet 1    [DISCONTINUED] losartan (COZAAR) 25 mg tablet Take 2 tablets (50 mg total) by mouth daily 60 tablet 2    clotrimazole-betamethasone (LOTRISONE) 1-0.05 % cream Apply topically 2 (two) times a day for 25 days 45 g 1     Current Facility-Administered Medications on File Prior to Visit   Medication Dose Route Frequency Provider Last Rate Last Admin    triamcinolone acetonide (KENALOG-40) 40 mg/mL injection 20 mg  20 mg Infiltration     20 mg at 10/03/24 1045    triamcinolone acetonide (Kenalog-40) 40 mg/mL injection 20 mg  20 mg Intra-articular     20 mg at 12/12/24 1115      Social History     Tobacco Use    Smoking status: Never    Smokeless tobacco: Never   Vaping Use    Vaping status: Never Used   Substance and Sexual Activity    Alcohol use: Yes     Comment: Only on special occasions    Drug use: Never    Sexual activity: Not Currently     Partners: Male     Birth control/protection: Post-menopausal        Objective   /68 (BP Location: Left arm, Patient Position: Sitting, Cuff Size: Standard)   Pulse 93   Ht 5' (1.524 m)   Wt 67.6 kg (149 lb)   SpO2 100%   BMI 29.10 kg/m²      Physical Exam  Vitals and nursing note reviewed.   Constitutional:       General: She is not in acute distress.     Appearance: She is well-developed. She is not diaphoretic.   HENT:      Head: Normocephalic and atraumatic.   Eyes:      General: No scleral icterus.        Right eye: No discharge.         Left eye: No discharge.      Conjunctiva/sclera: Conjunctivae normal.   Neck:      Vascular: No JVD.   Cardiovascular:      Rate and Rhythm: Normal rate and regular rhythm.      Heart sounds: No murmur heard.     No friction rub. No gallop.   Pulmonary:      Effort: Pulmonary effort is normal. No respiratory distress.      Breath sounds: Normal breath sounds. No wheezing or rales.   Abdominal:      General: Bowel sounds are normal. There is no  distension.      Palpations: Abdomen is soft.      Tenderness: There is no abdominal tenderness. There is no rebound.   Musculoskeletal:         General: No tenderness or deformity. Normal range of motion.      Cervical back: Normal range of motion and neck supple.      Right lower leg: Edema present.      Left lower leg: Edema present.      Comments: Trace to 1+ sock line edema   Skin:     General: Skin is warm and dry.      Coloration: Skin is not pale.      Findings: No erythema or rash.   Neurological:      Mental Status: She is alert and oriented to person, place, and time.      Coordination: Coordination normal.   Psychiatric:         Behavior: Behavior normal.         Thought Content: Thought content normal.         Judgment: Judgment normal.

## 2025-04-07 NOTE — PATIENT INSTRUCTIONS
1) Avoid NSAIDS - (Example - motrin, advil, ibuprofen, aleve, exederin, etc)  2) Always follow a low salt diet  3) If you have any issues with trouble with nausea, vomiting, urinating, blood in the urine, food tasting like metal, confusion, weakness, fatigue that is worsening, or any other questions, please call right away.  4) Please continue to follow regularly with your family physician and any other specialist that you are advised to see and continue to follow their recommendations  5) If you have any emergencies, please go to the nearest urgent care or emergency room and please inform your family physician and our office once you are able to.  6-please complete lab work in 3 to 4 months  7-appointment in 4 to 6 months  8-please call our office or cardiology office if you gain 3 to 5 pounds in 1 week or 3 pounds in 1 day  9-please keep blood pressure log and send in 2 weeks with once daily blood pressure check  - IF YOUR BLOOD PRESSURE ARE ABOVE 140(TOP NUMBER), CHANGE LOSARTAN TO ONE TABLET TWICE DAILY OF YOUR 25 MG LOSARTAN   - PLEASE CHECK AT HOME AND CONFIRM YOU ARE ON LOSARTAN 25 MG ONCE DAILY CURRENTLY AND CALL IF IT IS SOMETHING DIFFERENT

## 2025-04-26 NOTE — ASSESSMENT & PLAN NOTE
Discharge Planning Assessment  [facility]     Patient Name: Kemi Lorenzana  MRN: [id number]  Today's Date: 4/26/2025    Admit Date: 4/25/2025        Discharge Needs Assessment       Row Name 04/26/25 1051       Living Environment    People in Home grandchild(jolene)    Current Living Arrangements home    Potentially Unsafe Housing Conditions none    Primary Care Provided by self    Provides Primary Care For no one    Family Caregiver if Needed grandchild(jolene), adult    Quality of Family Relationships helpful;involved;supportive    Able to Return to Prior Arrangements yes       Resource/Environmental Concerns    Resource/Environmental Concerns none    Transportation Concerns none       Transition Planning    Patient/Family Anticipates Transition to home    Patient/Family Anticipated Services at Transition none       Discharge Needs Assessment    Equipment Currently Used at Home walker, standard    Concerns to be Addressed no discharge needs identified    Anticipated Changes Related to Illness none    Equipment Needed After Discharge none    Discharge Coordination/Progress Pt currently resides in her home with adult grandson. Pt has a walker at home that she uses as needed. Pt can provide her own transportation for follow up appointments. Pt has no needs at this time, SW will follow and assist with d/c needs if needed.                   Discharge Plan    No documentation.                      Demographic Summary    No documentation.                  Functional Status    No documentation.                  Psychosocial    No documentation.                  Abuse/Neglect    No documentation.                  Legal    No documentation.                  Substance Abuse    No documentation.                  Patient Forms    No documentation.                     Emery Glynn     · Mild hypernatremia with elevated BUN  · Likely mild dehydration  · Hold off diuretic medications for now  · Monitor  · For further workup and management if this worsens significantly

## 2025-04-30 DIAGNOSIS — I34.0 MODERATE TO SEVERE MITRAL REGURGITATION: ICD-10-CM

## 2025-04-30 DIAGNOSIS — I50.32 CHRONIC DIASTOLIC (CONGESTIVE) HEART FAILURE (HCC): ICD-10-CM

## 2025-04-30 DIAGNOSIS — I48.91 ATRIAL FIBRILLATION BY ELECTROCARDIOGRAM (HCC): ICD-10-CM

## 2025-05-01 RX ORDER — APIXABAN 2.5 MG/1
2.5 TABLET, FILM COATED ORAL 2 TIMES DAILY
Qty: 180 TABLET | Refills: 1 | Status: SHIPPED | OUTPATIENT
Start: 2025-05-01

## 2025-06-10 ENCOUNTER — OFFICE VISIT (OUTPATIENT)
Age: 85
End: 2025-06-10
Payer: MEDICARE

## 2025-06-10 VITALS — RESPIRATION RATE: 16 BRPM | HEIGHT: 60 IN | BODY MASS INDEX: 29.25 KG/M2 | WEIGHT: 149 LBS

## 2025-06-10 DIAGNOSIS — M77.42 METATARSALGIA OF BOTH FEET: Primary | ICD-10-CM

## 2025-06-10 DIAGNOSIS — I70.209 PERIPHERAL ARTERIOSCLEROSIS (HCC): ICD-10-CM

## 2025-06-10 DIAGNOSIS — M54.16 RADICULOPATHY OF LUMBAR REGION: ICD-10-CM

## 2025-06-10 DIAGNOSIS — B35.9 DERMATOPHYTOSIS: ICD-10-CM

## 2025-06-10 DIAGNOSIS — M25.572 PAIN IN JOINTS OF BOTH FEET: ICD-10-CM

## 2025-06-10 DIAGNOSIS — M77.41 METATARSALGIA OF BOTH FEET: Primary | ICD-10-CM

## 2025-06-10 DIAGNOSIS — M25.571 PAIN IN JOINTS OF BOTH FEET: ICD-10-CM

## 2025-06-10 PROCEDURE — 99213 OFFICE O/P EST LOW 20 MIN: CPT | Performed by: PODIATRIST

## 2025-06-12 DIAGNOSIS — G47.09 OTHER INSOMNIA: ICD-10-CM

## 2025-06-12 RX ORDER — HYDROXYZINE HYDROCHLORIDE 10 MG/1
10 TABLET, FILM COATED ORAL
Qty: 90 TABLET | Refills: 0 | Status: SHIPPED | OUTPATIENT
Start: 2025-06-12

## 2025-06-12 NOTE — TELEPHONE ENCOUNTER
Reason for call:   [x] Refill   [] Prior Auth  [] Other:     Office:   [x] PCP/Provider - [facility]  Authorized By:Kirk Willis DO    [] Specialty/Provider -     Medication: hydrOXYzine HCL (ATARAX) 10 mg tablet    Dose/Frequency: Take 1 tablet (10 mg total) by mouth daily at bedtime as needed (insomnia)    Quantity: 90    Pharmacy: [facility]     Local Pharmacy   Does the patient have enough for 3 days?   [] Yes   [] No - Send as HP to POD    Mail Away Pharmacy   Does the patient have enough for 10 days?   [] Yes   [x] No - Send as HP to POD

## 2025-07-01 ENCOUNTER — HOSPITAL ENCOUNTER (EMERGENCY)
Facility: HOSPITAL | Age: 85
Discharge: HOME | End: 2025-07-01
Attending: EMERGENCY MEDICINE | Admitting: EMERGENCY MEDICINE
Payer: MEDICARE

## 2025-07-01 ENCOUNTER — APPOINTMENT (EMERGENCY)
Dept: RADIOLOGY | Facility: HOSPITAL | Age: 85
End: 2025-07-01
Payer: MEDICARE

## 2025-07-01 ENCOUNTER — APPOINTMENT (EMERGENCY)
Dept: CT IMAGING | Facility: HOSPITAL | Age: 85
End: 2025-07-01
Payer: MEDICARE

## 2025-07-01 VITALS
HEIGHT: 60 IN | TEMPERATURE: 97.7 F | BODY MASS INDEX: 28.65 KG/M2 | WEIGHT: 145.94 LBS | HEART RATE: 84 BPM | DIASTOLIC BLOOD PRESSURE: 55 MMHG | RESPIRATION RATE: 19 BRPM | SYSTOLIC BLOOD PRESSURE: 118 MMHG | OXYGEN SATURATION: 99 %

## 2025-07-01 DIAGNOSIS — E86.0 DEHYDRATION: ICD-10-CM

## 2025-07-01 DIAGNOSIS — E16.2 HYPOGLYCEMIA: ICD-10-CM

## 2025-07-01 DIAGNOSIS — I48.91 ATRIAL FIBRILLATION (HCC): Primary | ICD-10-CM

## 2025-07-01 LAB
2HR DELTA HS TROPONIN: -2 NG/L
ALBUMIN SERPL BCG-MCNC: 4.4 G/DL (ref 3.5–5)
ALP SERPL-CCNC: 111 U/L (ref 34–104)
ALT SERPL W P-5'-P-CCNC: 11 U/L (ref 7–52)
ANION GAP SERPL CALCULATED.3IONS-SCNC: 14 MMOL/L (ref 4–13)
AST SERPL W P-5'-P-CCNC: 18 U/L (ref 13–39)
ATRIAL RATE: 125 BPM
BASOPHILS # BLD AUTO: 0.03 THOUSANDS/ÂΜL (ref 0–0.1)
BASOPHILS NFR BLD AUTO: 0 % (ref 0–1)
BILIRUB SERPL-MCNC: 1.2 MG/DL (ref 0.2–1)
BILIRUB UR QL STRIP: NEGATIVE
BNP SERPL-MCNC: 431 PG/ML (ref 0–100)
BUN SERPL-MCNC: 28 MG/DL (ref 5–25)
CALCIUM SERPL-MCNC: 10.2 MG/DL (ref 8.4–10.2)
CARDIAC TROPONIN I PNL SERPL HS: 36 NG/L (ref ?–50)
CARDIAC TROPONIN I PNL SERPL HS: 38 NG/L (ref ?–50)
CHLORIDE SERPL-SCNC: 101 MMOL/L (ref 96–108)
CLARITY UR: CLEAR
CO2 SERPL-SCNC: 26 MMOL/L (ref 21–32)
COLOR UR: COLORLESS
CREAT SERPL-MCNC: 1.32 MG/DL (ref 0.6–1.3)
EOSINOPHIL # BLD AUTO: 0.02 THOUSAND/ÂΜL (ref 0–0.61)
EOSINOPHIL NFR BLD AUTO: 0 % (ref 0–6)
ERYTHROCYTE [DISTWIDTH] IN BLOOD BY AUTOMATED COUNT: 14.6 % (ref 11.6–15.1)
GFR SERPL CREATININE-BSD FRML MDRD: 36 ML/MIN/1.73SQ M
GLUCOSE SERPL-MCNC: 114 MG/DL (ref 65–140)
GLUCOSE SERPL-MCNC: 73 MG/DL (ref 65–140)
GLUCOSE SERPL-MCNC: 77 MG/DL (ref 65–140)
GLUCOSE UR STRIP-MCNC: ABNORMAL MG/DL
HCT VFR BLD AUTO: 47.7 % (ref 34.8–46.1)
HGB BLD-MCNC: 15.6 G/DL (ref 11.5–15.4)
HGB UR QL STRIP.AUTO: NEGATIVE
IMM GRANULOCYTES # BLD AUTO: 0.03 THOUSAND/UL (ref 0–0.2)
IMM GRANULOCYTES NFR BLD AUTO: 0 % (ref 0–2)
KETONES UR STRIP-MCNC: NEGATIVE MG/DL
LEUKOCYTE ESTERASE UR QL STRIP: NEGATIVE
LYMPHOCYTES # BLD AUTO: 0.86 THOUSANDS/ÂΜL (ref 0.6–4.47)
LYMPHOCYTES NFR BLD AUTO: 9 % (ref 14–44)
MCH RBC QN AUTO: 28.5 PG (ref 26.8–34.3)
MCHC RBC AUTO-ENTMCNC: 32.7 G/DL (ref 31.4–37.4)
MCV RBC AUTO: 87 FL (ref 82–98)
MONOCYTES # BLD AUTO: 0.42 THOUSAND/ÂΜL (ref 0.17–1.22)
MONOCYTES NFR BLD AUTO: 4 % (ref 4–12)
NEUTROPHILS # BLD AUTO: 8.25 THOUSANDS/ÂΜL (ref 1.85–7.62)
NEUTS SEG NFR BLD AUTO: 87 % (ref 43–75)
NITRITE UR QL STRIP: NEGATIVE
NRBC BLD AUTO-RTO: 0 /100 WBCS
PH UR STRIP.AUTO: 6.5 [PH]
PLATELET # BLD AUTO: 161 THOUSANDS/UL (ref 149–390)
PMV BLD AUTO: 10.7 FL (ref 8.9–12.7)
POTASSIUM SERPL-SCNC: 4 MMOL/L (ref 3.5–5.3)
PROT SERPL-MCNC: 7.4 G/DL (ref 6.4–8.4)
PROT UR STRIP-MCNC: NEGATIVE MG/DL
QRS AXIS: 150 DEGREES
QRSD INTERVAL: 110 MS
QT INTERVAL: 352 MS
QTC INTERVAL: 503 MS
RBC # BLD AUTO: 5.47 MILLION/UL (ref 3.81–5.12)
SODIUM SERPL-SCNC: 141 MMOL/L (ref 135–147)
SP GR UR STRIP.AUTO: 1 (ref 1–1.03)
T WAVE AXIS: -4 DEGREES
UROBILINOGEN UR STRIP-ACNC: <2 MG/DL
VENTRICULAR RATE: 123 BPM
WBC # BLD AUTO: 9.61 THOUSAND/UL (ref 4.31–10.16)

## 2025-07-01 PROCEDURE — 80053 COMPREHEN METABOLIC PANEL: CPT

## 2025-07-01 PROCEDURE — 83880 ASSAY OF NATRIURETIC PEPTIDE: CPT

## 2025-07-01 PROCEDURE — 72125 CT NECK SPINE W/O DYE: CPT

## 2025-07-01 PROCEDURE — 74177 CT ABD & PELVIS W/CONTRAST: CPT

## 2025-07-01 PROCEDURE — 82948 REAGENT STRIP/BLOOD GLUCOSE: CPT

## 2025-07-01 PROCEDURE — 99285 EMERGENCY DEPT VISIT HI MDM: CPT | Performed by: EMERGENCY MEDICINE

## 2025-07-01 PROCEDURE — 96365 THER/PROPH/DIAG IV INF INIT: CPT

## 2025-07-01 PROCEDURE — 85025 COMPLETE CBC W/AUTO DIFF WBC: CPT

## 2025-07-01 PROCEDURE — 93010 ELECTROCARDIOGRAM REPORT: CPT | Performed by: INTERNAL MEDICINE

## 2025-07-01 PROCEDURE — 71046 X-RAY EXAM CHEST 2 VIEWS: CPT

## 2025-07-01 PROCEDURE — 36415 COLL VENOUS BLD VENIPUNCTURE: CPT

## 2025-07-01 PROCEDURE — 84484 ASSAY OF TROPONIN QUANT: CPT

## 2025-07-01 PROCEDURE — 70450 CT HEAD/BRAIN W/O DYE: CPT

## 2025-07-01 PROCEDURE — 99285 EMERGENCY DEPT VISIT HI MDM: CPT

## 2025-07-01 PROCEDURE — 93005 ELECTROCARDIOGRAM TRACING: CPT

## 2025-07-01 RX ADMIN — IOHEXOL 85 ML: 350 INJECTION, SOLUTION INTRAVENOUS at 12:00

## 2025-07-01 RX ADMIN — SODIUM CHLORIDE, SODIUM LACTATE, POTASSIUM CHLORIDE, AND CALCIUM CHLORIDE 500 ML: .6; .31; .03; .02 INJECTION, SOLUTION INTRAVENOUS at 11:24

## 2025-07-01 NOTE — ED PROVIDER NOTES
Time reflects when diagnosis was documented in both MDM as applicable and the Disposition within this note       Time User Action Codes Description Comment    7/1/2025  3:06 PM Claudia Recinos [I48.91] Atrial fibrillation (HCC)     7/1/2025  3:06 PM Claudia Recinos Add [E16.2] Hypoglycemia     7/1/2025  3:06 PM Claudia Recinos [E86.0] Dehydration           ED Disposition       ED Disposition   Discharge    Condition   Stable    Date/Time   Tue Jul 1, 2025  3:06 PM    Comment   Rosalind Wise discharge to home/self care.                   Assessment & Plan       Medical Decision Making  Amount and/or Complexity of Data Reviewed  Labs: ordered.  Radiology: ordered and independent interpretation performed.    Risk  Prescription drug management.    Patient is an 84-year-old female presenting for confusion and dizziness onset this morning.  Patient was found to be hypoglycemic with improvement after p.o. intake.  Patient had decreased appetite yesterday which is likely the cause of her hypoglycemia along with her Jardiance use.  Patient was found to have abdominal tenderness on exam as well as dry mucous membranes.  CT scan showed no acute findings in the abdomen, labs unremarkable: no UTI, cardiac abnormalities, electrolyte abnormalities.  Does show hemoconcentration which is appropriate in the setting of her decreased PO intake yesterday.  Patient received IV and p.o. hydration, cookies, with improvement of her symptoms and improvement of her heart rate from 130s to 80s.  Repeat POC glucose 114.  Patient is ambulatory in the ER without dizziness, lightheadedness, or shortness of breath.  After decision making discussion with patient and son at bedside, patient opted to go home with outpatient follow-up.    DDx including but not limited to: metabolic abnormality, cardiac arrhythmia, ACS, dehydration       Medications   lactated ringers bolus 500 mL (0 mL Intravenous Stopped 7/1/25 1224)   iohexol (OMNIPAQUE) 350 MG/ML  injection (MULTI-DOSE) 85 mL (85 mL Intravenous Given 7/1/25 1200)       ED Risk Strat Scores                    No data recorded        SBIRT 22yo+      Flowsheet Row Most Recent Value   Initial Alcohol Screen: US AUDIT-C     1. How often do you have a drink containing alcohol? 0 Filed at: 07/01/2025 1019   2. How many drinks containing alcohol do you have on a typical day you are drinking?  0 Filed at: 07/01/2025 1019   3a. Male UNDER 65: How often do you have five or more drinks on one occasion? 0 Filed at: 07/01/2025 1019   3b. FEMALE Any Age, or MALE 65+: How often do you have 4 or more drinks on one occassion? 0 Filed at: 07/01/2025 1019   Audit-C Score 0 Filed at: 07/01/2025 1019   LUL: How many times in the past year have you...    Used an illegal drug or used a prescription medication for non-medical reasons? Never Filed at: 07/01/2025 1019                            History of Present Illness       Chief Complaint   Patient presents with    Rapid Heart Rate     Pt from home with son ems called [facility]. Family notes some failure to thrive not eating. EMS noted her to be in AFIB RVR. Took her morning meds right before leaving. Also found her glucose to be in the 50's pt did not want to finish the oral glucose. She also notes that she has been here before for depression. Lost her  a few years ago and just misses him.        Past Medical History[1]   Past Surgical History[2]   Family History[3]   Social History[4]   E-Cigarette/Vaping    E-Cigarette Use Never User       E-Cigarette/Vaping Substances    Nicotine No     THC No     CBD No     Flavoring No     Other No     Unknown No       I have reviewed and agree with the history as documented.     HPI    Patient is an 84-year-old female presenting to the ED via EMS with dizziness and confusion onset this morning.  Patient reports she felt unwell, called EMS, found to be hypoglycemic with a blood sugar of 53.  Patient reports taking Jardiance, no diabetes  medications, but reports she felt unwell yesterday and did not eat.  She was able to eat crackers here in the ER, and feels much improved now.  Patient denies fevers, chills, chest pain, nausea, vomiting, chest pain, shortness of breath or any other symptoms at this time.    Patient also notes falling last week with head strike, but did not get evaluated.  Takes Eliquis for A-fib.  Patient lives with her son, however patient is fairly independent and called EMS today without her son knowing.      Review of Systems  As per HPI      Objective       ED Triage Vitals [07/01/25 1019]   Temperature Pulse Blood Pressure Respirations SpO2 Patient Position - Orthostatic VS   97.7 °F (36.5 °C) (!) 113 139/65 20 99 % Lying      Temp Source Heart Rate Source BP Location FiO2 (%) Pain Score    Oral Monitor Right arm -- No Pain      Vitals      Date and Time Temp Pulse SpO2 Resp BP Pain Score FACES Pain Rating User   07/01/25 1430 -- 84 99 % -- 118/55 -- --    07/01/25 1400 -- 87 98 % -- 124/60 -- --    07/01/25 1330 -- 93 97 % -- 160/67 -- --    07/01/25 1259 -- 95 98 % -- 139/59 -- --    07/01/25 1130 -- 91 98 % 19 147/64 -- --    07/01/25 1019 97.7 °F (36.5 °C) 113 99 % 20 139/65 No Pain -- ROSAURA            Physical Exam  Vitals and nursing note reviewed.   Constitutional:       General: She is not in acute distress.     Appearance: She is not ill-appearing, toxic-appearing or diaphoretic.   HENT:      Head: Normocephalic and atraumatic.      Right Ear: External ear normal.      Left Ear: External ear normal.      Nose: Nose normal.      Mouth/Throat:      Mouth: Mucous membranes are dry.     Eyes:      General: No scleral icterus.     Extraocular Movements: Extraocular movements intact.      Conjunctiva/sclera: Conjunctivae normal.       Cardiovascular:      Rate and Rhythm: Normal rate and regular rhythm.      Pulses: Normal pulses.           Radial pulses are 2+ on the right side.        Dorsalis pedis pulses are 2+  on the right side and 2+ on the left side.      Heart sounds: Normal heart sounds, S1 normal and S2 normal. No murmur heard.  Pulmonary:      Effort: Pulmonary effort is normal. No respiratory distress.      Breath sounds: Normal breath sounds. No stridor. No wheezing.   Abdominal:      General: Bowel sounds are normal.      Palpations: Abdomen is soft.      Tenderness: There is generalized abdominal tenderness and tenderness in the right lower quadrant and left lower quadrant. There is guarding. There is no rebound.     Musculoskeletal:         General: Normal range of motion.      Cervical back: Normal range of motion.     Skin:     General: Skin is warm and dry.     Neurological:      General: No focal deficit present.      Mental Status: She is alert and oriented to person, place, and time.     Psychiatric:         Mood and Affect: Mood normal.         Results Reviewed       Procedure Component Value Units Date/Time    Fingerstick Glucose (POCT) [[id number]]  (Normal) Collected: 07/01/25 1416    Lab Status: Final result Specimen: Blood Updated: 07/01/25 1417     POC Glucose 114 mg/dl     HS Troponin I 2hr [[id number]]  (Normal) Collected: 07/01/25 1259    Lab Status: Final result Specimen: Blood from Arm, Left Updated: 07/01/25 1340     hs TnI 2hr 36 ng/L      Delta 2hr hsTnI -2 ng/L     UA w Reflex to Microscopic w Reflex to Culture [[id number]]  (Abnormal) Collected: 07/01/25 1259    Lab Status: Final result Specimen: Urine, Clean Catch Updated: 07/01/25 1337     Color, UA Colorless     Clarity, UA Clear     Specific Gravity, UA 1.005     pH, UA 6.5     Leukocytes, UA Negative     Nitrite, UA Negative     Protein, UA Negative mg/dl      Glucose,  (3/10%) mg/dl      Ketones, UA Negative mg/dl      Urobilinogen, UA <2.0 mg/dl      Bilirubin, UA Negative     Occult Blood, UA Negative    HS Troponin 0hr (reflex protocol) [[id number]]  (Normal) Collected: 07/01/25 1031    Lab Status: Final result Specimen: Blood  from Arm, Left Updated: 07/01/25 1101     hs TnI 0hr 38 ng/L     B-Type Natriuretic Peptide(BNP) [[id number]]  (Abnormal) Collected: 07/01/25 1031    Lab Status: Final result Specimen: Blood from Arm, Left Updated: 07/01/25 1059      pg/mL     Comprehensive metabolic panel [[id number]]  (Abnormal) Collected: 07/01/25 1031    Lab Status: Final result Specimen: Blood from Arm, Left Updated: 07/01/25 1055     Sodium 141 mmol/L      Potassium 4.0 mmol/L      Chloride 101 mmol/L      CO2 26 mmol/L      ANION GAP 14 mmol/L      BUN 28 mg/dL      Creatinine 1.32 mg/dL      Glucose 73 mg/dL      Calcium 10.2 mg/dL      AST 18 U/L      ALT 11 U/L      Alkaline Phosphatase 111 U/L      Total Protein 7.4 g/dL      Albumin 4.4 g/dL      Total Bilirubin 1.20 mg/dL      eGFR 36 ml/min/1.73sq m     Narrative:      National Kid[employer] Disease Foundation guidelines for Chronic Kidney Disease (CKD):     Stage 1 with normal or high GFR (GFR > 90 mL/min/1.73 square meters)    Stage 2 Mild CKD (GFR = 60-89 mL/min/1.73 square meters)    Stage 3A Moderate CKD (GFR = 45-59 mL/min/1.73 square meters)    Stage 3B Moderate CKD (GFR = 30-44 mL/min/1.73 square meters)    Stage 4 Severe CKD (GFR = 15-29 mL/min/1.73 square meters)    Stage 5 End Stage CKD (GFR <15 mL/min/1.73 square meters)  Note: GFR calculation is accurate only with a steady state creatinine    CBC and differential [[id number]]  (Abnormal) Collected: 07/01/25 1031    Lab Status: Final result Specimen: Blood from Arm, Left Updated: 07/01/25 1042     WBC 9.61 Thousand/uL      RBC 5.47 Million/uL      Hemoglobin 15.6 g/dL      Hematocrit 47.7 %      MCV 87 fL      MCH 28.5 pg      MCHC 32.7 g/dL      RDW 14.6 %      MPV 10.7 fL      Platelets 161 Thousands/uL      nRBC 0 /100 WBCs      Segmented % 87 %      Immature Grans % 0 %      Lymphocytes % 9 %      Monocytes % 4 %      Eosinophils Relative 0 %      Basophils Relative 0 %      Absolute Neutrophils 8.25 Thousands/µL       Absolute Immature Grans 0.03 Thousand/uL      Absolute Lymphocytes 0.86 Thousands/µL      Absolute Monocytes 0.42 Thousand/µL      Eosinophils Absolute 0.02 Thousand/µL      Basophils Absolute 0.03 Thousands/µL     Fingerstick Glucose (POCT) [[id number]]  (Normal) Collected: 07/01/25 1016    Lab Status: Final result Specimen: Blood Updated: 07/01/25 1022     POC Glucose 77 mg/dl             CT head without contrast   Final Interpretation by Jama Jones MD (07/01 1208)      1.  No acute intracranial abnormality.   2.  No cervical spine fracture or traumatic malalignment.                  Workstation performed: [id number]         CT spine cervical without contrast   Final Interpretation by Jama Jones MD (07/01 1208)      1.  No acute intracranial abnormality.   2.  No cervical spine fracture or traumatic malalignment.                  Workstation performed: [id number]         CT abdomen pelvis with contrast   Final Interpretation by Guy Meehan MD (07/01 1328)      No bowel wall thickening or bowel obstruction. No intraperitoneal free air or ascites.      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      Small pericardial effusion.         Workstation performed: [id number]         XR chest 2 views   ED Interpretation by Claudia Recinos MD (07/01 1401)   No acute cardiopulmonary findings on my independently interpretation.          ECG 12 Lead Documentation Only    Date/Time: 7/1/2025 11:00 AM    Performed by: Claudia Recinos MD  Authorized by: Claudia Recinos MD    ECG reviewed by me, the ED Provider: yes    Patient location:  ED  Comments:      Atrial fibrillation with RVR, right bundle branch block, nonspecific ST or T wave abnormalities, limited secondary to artifact.  No acute change from previous EKG on 2/3/2025.      ED Medication and Procedure Management   Prior to Admission Medications   Prescriptions Last Dose Informant Patient Reported? Taking?    Cholecalciferol (Vitamin D3) 50 MCG CAPS  Self Yes No   Sig: Take by mouth in the morning   Diclofenac Sodium (VOLTAREN) 1 %  Self No No   Sig: Apply 2 g topically in the morning APPLY NO MORE THAN 3 DAYS IN A ROW THEN TAKE A BREAK FOR ONE WEEK FROM USE.   Empagliflozin (Jardiance) 10 MG TABS tablet  Self No No   Sig: Take 1 tablet (10 mg total) by mouth every morning   Magnesium 400 MG CAPS  Self Yes No   Sig: Take by mouth in the morning Take 500 mg. BID   apixaban (Eliquis) 2.5 mg   No No   Sig: TAKE 1 TABLET TWICE DAILY   atorvastatin (LIPITOR) 40 mg tablet  Self No No   Sig: Take 1 tablet (40 mg total) by mouth every evening   bumetanide (BUMEX) 1 mg tablet  Self No No   Sig: TAKE 1 TABLET EVERY DAY (NEW DOSE)   clotrimazole-betamethasone (LOTRISONE) 1-0.05 % cream   No No   Sig: Apply topically 2 (two) times a day for 25 days   hydrOXYzine HCL (ATARAX) 10 mg tablet   No No   Sig: Take 1 tablet (10 mg total) by mouth daily at bedtime as needed (insomnia)   levothyroxine 88 mcg tablet  Self No No   Sig: Take 1 tablet (88 mcg total) by mouth daily   losartan (COZAAR) 25 mg tablet   No No   Sig: Take 1 tablet (25 mg total) by mouth daily   metoprolol tartrate (LOPRESSOR) 25 mg tablet  Self No No   Sig: Take 0.5 tablets (12.5 mg total) by mouth every 12 (twelve) hours   pantoprazole (PROTONIX) 20 mg tablet  Self No No   Sig: Take 1 tablet (20 mg total) by mouth daily   spironolactone (ALDACTONE) 25 mg tablet  Self No No   Sig: TAKE 1/2 TABLET EVERY DAY      Facility-Administered Medications Last Administration Doses Remaining   triamcinolone acetonide (KENALOG-40) 40 mg/mL injection 20 mg 10/3/2024 10:45 AM    triamcinolone acetonide (Kenalog-40) 40 mg/mL injection 20 mg 12/12/2024 11:15 AM         Discharge Medication List as of 7/1/2025  3:07 PM        CONTINUE these medications which have NOT CHANGED    Details   apixaban (Eliquis) 2.5 mg TAKE 1 TABLET TWICE DAILY, Starting Thu 5/1/2025, Normal       atorvastatin (LIPITOR) 40 mg tablet Take 1 tablet (40 mg total) by mouth every evening, Starting Fri 12/13/2024, Normal      bumetanide (BUMEX) 1 mg tablet TAKE 1 TABLET EVERY DAY (NEW DOSE), Normal      Cholecalciferol (Vitamin D3) 50 MCG CAPS Take by mouth in the morning, Historical Med      clotrimazole-betamethasone (LOTRISONE) 1-0.05 % cream Apply topically 2 (two) times a day for 25 days, Starting Thu 12/12/2024, Until Mon 1/6/2025, Normal      Diclofenac Sodium (VOLTAREN) 1 % Apply 2 g topically in the morning APPLY NO MORE THAN 3 DAYS IN A ROW THEN TAKE A BREAK FOR ONE WEEK FROM USE., Starting Tue 7/18/2023, Normal      Empagliflozin (Jardiance) 10 MG TABS tablet Take 1 tablet (10 mg total) by mouth every morning, Starting Thu 2/13/2025, Normal      hydrOXYzine HCL (ATARAX) 10 mg tablet Take 1 tablet (10 mg total) by mouth daily at bedtime as needed (insomnia), Starting Thu 6/12/2025, Normal      levothyroxine 88 mcg tablet Take 1 tablet (88 mcg total) by mouth daily, Starting Mon 1/6/2025, Normal      losartan (COZAAR) 25 mg tablet Take 1 tablet (25 mg total) by mouth daily, Starting Mon 4/7/2025, No Print      Magnesium 400 MG CAPS Take by mouth in the morning Take 500 mg. BID, Historical Med      metoprolol tartrate (LOPRESSOR) 25 mg tablet Take 0.5 tablets (12.5 mg total) by mouth every 12 (twelve) hours, Starting Thu 1/30/2025, Normal      pantoprazole (PROTONIX) 20 mg tablet Take 1 tablet (20 mg total) by mouth daily, Starting Mon 1/6/2025, Normal      spironolactone (ALDACTONE) 25 mg tablet TAKE 1/2 TABLET EVERY DAY, Starting Mon 1/20/2025, Normal           No discharge procedures on file.  ED SEPSIS DOCUMENTATION   Time reflects when diagnosis was documented in both MDM as applicable and the Disposition within this note       Time User Action Codes Description Comment    7/1/2025  3:06 PM Claudia Recinos [I48.91] Atrial fibrillation (HCC)     7/1/2025  3:06 PM Claudia Recinos [E16.2] Hypoglycemia      2025  3:06 PM Claudia Recinos Add [E86.0] Dehydration                             [1]   Past Medical History:  Diagnosis Date    Anxiety     Arthritis     joints    Bunion     Cataract     Disease of thyroid gland     Eczema     GERD (gastroesophageal reflux disease)     Gout     Heart failure (HCC)     Hepatitis     Hiatal hernia     Hypertension     Intentional overdose (HCC) 2024    Nephrolithiasis 2017    Onychomycosis     last assessed: 16    Orthopnea     resolved: 16    Pseudogout of left wrist     Sleep apnea     wears CPAP    SOB (shortness of breath) on exertion 10/26/2018    Stroke (HCC)    [2]   Past Surgical History:  Procedure Laterality Date    ABDOMINAL SURGERY          BRAIN HEMATOMA EVACUATION Right 2020    Procedure: Right decompressive craniectomy and evacuation of intraparenchymal hematoma;  Surgeon: Apolinar Herrera MD;  Location:  MAIN OR;  Service: Neurosurgery    BREAST SURGERY Right     cyst removal    CARPAL TUNNEL RELEASE Left     CARPAL TUNNEL RELEASE Right     CATARACT EXTRACTION       SECTION  1960    x1    COLONOSCOPY N/A 2018    Procedure: COLONOSCOPY;  Surgeon: Alejandro Carlson MD;  Location: [facility] GI LAB;  Service: Gastroenterology    DILATION AND CURETTAGE OF UTERUS  1967    EYE SURGERY Left 2006    cataracts    HERNIA REPAIR      umbilical hernia    INCISIONAL HERNIA REPAIR      incarcerated    KNEE ARTHROSCOPY Right     ID RPLCMT BONE FLAP/PROSTHETIC PLATE SKULL Right 2020    Procedure: right frontotemporal replacement cranioplasty;  Surgeon: Apolinar Herrera MD;  Location:  MAIN OR;  Service: Neurosurgery    ID XCAPSL CTRC RMVL INSJ IO LENS PROSTH W/O ECP Right 3/27/2017    Procedure: EXTRACTION EXTRACAPSULAR CATARACT PHACO INTRAOCULAR LENS (IOL);  Surgeon: Trip Gilbert MD;  Location: [facility] MAIN OR;  Service: Ophthalmology   [3]   Family History  Problem Relation Name Age of Onset    Diabetes Mother Christine      Coronary artery disease Mother Christine     Arthritis Mother Christine     Hypertension Mother Christine     Hypertension Father Rupert     Diabetes Brother Hayden     Diabetes Son      Arthritis Family     [4]   Social History  Tobacco Use    Smoking status: Never    Smokeless tobacco: Never   Vaping Use    Vaping status: Never Used   Substance Use Topics    Alcohol use: Yes     Comment: Only on special occasions    Drug use: Never        Claudia Recinos MD  07/01/25 6806

## 2025-07-01 NOTE — ED ATTENDING ATTESTATION
7/1/2025  INiurka, , saw and evaluated the patient. I have discussed the patient with the resident/non-physician practitioner and agree with the resident's/non-physician practitioner's findings, Plan of Care, and MDM as documented in the resident's/non-physician practitioner's note, except where noted. All available labs and Radiology studies were reviewed.  I was present for key portions of any procedure(s) performed by the resident/non-physician practitioner and I was immediately available to provide assistance.       At this point I agree with the current assessment done in the Emergency Department.  I have conducted an independent evaluation of this patient a history and physical is as follows:    ED Course   84 year old female presents for evaluation of dizziness and confusion this am.  Patient called EMS and she was found to be hypoglycemic with BS 53.  Yesterday she did not feel well and did not eat anything.  She has been having generalized abdominal pain.  She took all of her medications today.  Patient lives with son but he was unaware of her symptoms and she called EMS independently due to feeling off and confused.  Patient had a fall last week and struck head - did not tell anyone and was not seen - takes Eliquis.      Past medical history: Atrial fibrillation, insomnia, hypertension congestive heart failure, hypothyroidism, gout, peripheral arterial sclerosis    Physical exam: Patient is awake and alert, no acute distress.  Pleasant.  Resting comfortably.  Pupils are equal and reactive.  Neck is supple without JVD.  Heart is regular rate and rhythm without ectopy.  Lungs are clear to auscultation.  Chest wall is nontender to palpation.  Abdomen is soft, diffusely tender, nondistended with normal active bowel sounds.  Trace bilateral lower extremity edema.  No focal motor or sensory deficits.  Speech is clear and appropriate     Assessment/plan: 84-year-old female presents after having an episode  of dizziness with confusion, found to be hypoglycemic.  Differential diagnosis includes was not limited to acute hypoglycemia secondary to medication side effect, poor oral intake, dehydration, electrolyte abnormality.  Patient did have a fall with head strike on Eliquis last week without evaluation.  Will check CT head EKG, cardiac enzymes, electrolytes.  Of note patient did feel better after eating, symptoms resolved while in the department.    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  Supportive care recommended.  Patient encouraged to increase oral hydration and to follow-up with PCP.  Critical Care Time  Procedures

## 2025-07-02 ENCOUNTER — PATIENT OUTREACH (OUTPATIENT)
Dept: CASE MANAGEMENT | Facility: OTHER | Age: 85
End: 2025-07-02

## 2025-07-02 ENCOUNTER — VBI (OUTPATIENT)
Dept: FAMILY MEDICINE CLINIC | Facility: CLINIC | Age: 85
End: 2025-07-02

## 2025-07-02 NOTE — TELEPHONE ENCOUNTER
07/02/25 8:48 AM    Patient contacted post ED visit, [facility] department spoke with patient/caregiver and outreach was successful.    Thank you.  Braydon Leslie MA  [facility]

## 2025-07-02 NOTE — PROGRESS NOTES
Outpatient Care Management Note:  Referral for care management received after recent ED visit. Chart review completed.     History includes HTN, HF, atrial fibrillation, WENDI, GERD, hypothyroidism, secondary hyperparathyroidism, CKD, spinal stenosis, and prediabetes.     7/1/2025-Presented to ED for evaluation of dizziness and confusion starting this AM.  Did not feel well or eat day prior but did take her medications.  Had fall weak prior with head strike but did not tell her son.  On Eliquis.  Found to be hypoglycemic in ER, on Jardiance.   Received IV and oral hydration.  Repeat glucose normal after eating.  Discharged to home after resolution of symptoms.     Outreach call placed to Rosalind.  Introduced myself and my role.     Rosalind states she is feeling better today.  Discussed importance if eating and drinking, especially when it is warm out to avoid dehydration.  Verbalized understanding.  Rosalind states she is making sure to drink water and eat her meals.  She also endorse eating more fruit since that is something she like.  Rosalind does not check her blood sugars as she is not diabetic.     Rosalind lives with her son but considers herself to be independent in most aspects of her care.  She does not feel outreach is needed at this time.

## 2025-07-10 DIAGNOSIS — I10 BENIGN ESSENTIAL HTN: ICD-10-CM

## 2025-07-10 RX ORDER — SPIRONOLACTONE 25 MG/1
12.5 TABLET ORAL DAILY
Qty: 45 TABLET | Refills: 1 | Status: SHIPPED | OUTPATIENT
Start: 2025-07-10

## 2025-07-11 ENCOUNTER — OFFICE VISIT (OUTPATIENT)
Dept: FAMILY MEDICINE CLINIC | Facility: CLINIC | Age: 85
End: 2025-07-11

## 2025-07-11 VITALS
HEART RATE: 80 BPM | BODY MASS INDEX: 28.27 KG/M2 | SYSTOLIC BLOOD PRESSURE: 116 MMHG | RESPIRATION RATE: 16 BRPM | WEIGHT: 144 LBS | HEIGHT: 60 IN | DIASTOLIC BLOOD PRESSURE: 64 MMHG | OXYGEN SATURATION: 97 %

## 2025-07-11 DIAGNOSIS — I48.0 PAROXYSMAL ATRIAL FIBRILLATION (HCC): ICD-10-CM

## 2025-07-11 DIAGNOSIS — I13.0 HYPERTENSIVE HEART AND CHRONIC KIDNEY DISEASE WITH HEART FAILURE AND STAGE 1 THROUGH STAGE 4 CHRONIC KIDNEY DISEASE, OR CHRONIC KIDNEY DISEASE (HCC): ICD-10-CM

## 2025-07-11 DIAGNOSIS — I49.1 PAC (PREMATURE ATRIAL CONTRACTION): ICD-10-CM

## 2025-07-11 DIAGNOSIS — E79.0 HYPERURICEMIA: ICD-10-CM

## 2025-07-11 DIAGNOSIS — I48.19 PERSISTENT ATRIAL FIBRILLATION (HCC): ICD-10-CM

## 2025-07-11 DIAGNOSIS — E03.9 HYPOTHYROIDISM, UNSPECIFIED TYPE: Chronic | ICD-10-CM

## 2025-07-11 DIAGNOSIS — I10 BENIGN ESSENTIAL HYPERTENSION: ICD-10-CM

## 2025-07-11 DIAGNOSIS — I50.32 CHRONIC DIASTOLIC (CONGESTIVE) HEART FAILURE (HCC): ICD-10-CM

## 2025-07-11 DIAGNOSIS — Z99.89 USES WALKER: ICD-10-CM

## 2025-07-11 DIAGNOSIS — M1A.0720 IDIOPATHIC CHRONIC GOUT OF LEFT ANKLE WITHOUT TOPHUS: ICD-10-CM

## 2025-07-11 DIAGNOSIS — R73.03 PREDIABETES: Primary | ICD-10-CM

## 2025-07-11 DIAGNOSIS — E78.5 HYPERLIPIDEMIA, UNSPECIFIED HYPERLIPIDEMIA TYPE: ICD-10-CM

## 2025-07-11 RX ORDER — ATORVASTATIN CALCIUM 40 MG/1
40 TABLET, FILM COATED ORAL EVERY EVENING
Qty: 90 TABLET | Refills: 1 | Status: SHIPPED | OUTPATIENT
Start: 2025-07-11

## 2025-07-11 RX ORDER — LEVOTHYROXINE SODIUM 88 UG/1
88 TABLET ORAL DAILY
Qty: 90 TABLET | Refills: 1 | Status: SHIPPED | OUTPATIENT
Start: 2025-07-11

## 2025-07-11 RX ORDER — PANTOPRAZOLE SODIUM 20 MG/1
20 TABLET, DELAYED RELEASE ORAL DAILY
Qty: 90 TABLET | Refills: 1 | Status: SHIPPED | OUTPATIENT
Start: 2025-07-11

## 2025-07-11 NOTE — PROGRESS NOTES
Subjective:      Patient ID: Rosalind Wise is a 84 y.o. female.    84-year-old female presents with her daughter-in-law for 5-month follow-up in regards to chronic condition.  Patient did recently have ER visit for episode of dizziness, hypoglycemia and dehydration.  Patient had gone an entire day without eating anything.  She does take Jardiance due to history of heart failure.  She states that there are times when she just does not feel hungry and she only eats when she feels like eating.  She had limited blood work performed through the ER.  Thankfully her cardiac enzymes were negative.  Still needs to complete thyroid function study, lipid panel as well as hemoglobin A1c.  Today she feels well.  Ambulates with the assistance of a walker.  Family tries to stressed the importance of making certain that she is eating and is hydrated.        Past Medical History[1]    Family History[2]    Past Surgical History[3]     reports that she has never smoked. She has never used smokeless tobacco. She reports current alcohol use. She reports that she does not use drugs.    Current Medications[4]    The following portions of the patient's history were reviewed and updated as appropriate: allergies, current medications, past family history, past medical history, past social history, past surgical history and problem list.    Review of Systems   Constitutional: Negative.    HENT: Negative.     Eyes: Negative.    Respiratory: Negative.     Cardiovascular: Negative.    Gastrointestinal: Negative.    Endocrine: Negative.    Genitourinary: Negative.    Musculoskeletal:  Positive for arthralgias and gait problem (Ambulates with the assistance of a walker).   Skin: Negative.    Allergic/Immunologic: Negative.    Hematological: Negative.    Psychiatric/Behavioral: Negative.             Objective:    /64   Pulse 80   Resp 16   Ht 5' (1.524 m)   Wt 65.3 kg (144 lb)   SpO2 97%   BMI 28.12 kg/m²      Physical Exam  Vitals  and nursing note reviewed.   Constitutional:       General: She is not in acute distress.     Appearance: She is well-developed. She is not diaphoretic.   HENT:      Head: Normocephalic and atraumatic.      Nose: Nose normal.     Eyes:      Conjunctiva/sclera: Conjunctivae normal.      Pupils: Pupils are equal, round, and reactive to light.       Cardiovascular:      Rate and Rhythm: Normal rate. Rhythm irregular.      Heart sounds: Normal heart sounds. No murmur heard.  Pulmonary:      Effort: Pulmonary effort is normal.      Breath sounds: Normal breath sounds. No rales.   Abdominal:      General: Bowel sounds are normal.      Palpations: Abdomen is soft.     Musculoskeletal:         General: Normal range of motion.      Cervical back: Normal range of motion and neck supple.      Right lower leg: No edema.      Left lower leg: No edema.     Skin:     General: Skin is warm and dry.      Findings: No rash.     Neurological:      Mental Status: She is alert and oriented to person, place, and time.      Deep Tendon Reflexes: Reflexes are normal and symmetric.     Psychiatric:         Behavior: Behavior normal.         Thought Content: Thought content normal.         Judgment: Judgment normal.           Recent Results (from the past 6 weeks)   Fingerstick Glucose (POCT)    Collection Time: 07/01/25 10:16 AM   Result Value Ref Range    POC Glucose 77 65 - 140 mg/dl   ECG 12 lead    Collection Time: 07/01/25 10:21 AM   Result Value Ref Range    Ventricular Rate 123 BPM    Atrial Rate 125 BPM    AZ Interval  ms    QRSD Interval 110 ms    QT Interval 352 ms    QTC Interval 503 ms    P Axis  degrees    QRS Axis 150 degrees    T Wave Axis -4 degrees   CBC and differential    Collection Time: 07/01/25 10:31 AM   Result Value Ref Range    WBC 9.61 4.31 - 10.16 Thousand/uL    RBC 5.47 (H) 3.81 - 5.12 Million/uL    Hemoglobin 15.6 (H) 11.5 - 15.4 g/dL    Hematocrit 47.7 (H) 34.8 - 46.1 %    MCV 87 82 - 98 fL    MCH 28.5 26.8 -  "34.3 pg    MCHC 32.7 31.4 - 37.4 g/dL    RDW 14.6 11.6 - 15.1 %    MPV 10.7 8.9 - 12.7 fL    Platelets 161 149 - 390 Thousands/uL    nRBC 0 /100 WBCs    Segmented % 87 (H) 43 - 75 %    Immature Grans % 0 0 - 2 %    Lymphocytes % 9 (L) 14 - 44 %    Monocytes % 4 4 - 12 %    Eosinophils Relative 0 0 - 6 %    Basophils Relative 0 0 - 1 %    Absolute Neutrophils 8.25 (H) 1.85 - 7.62 Thousands/µL    Absolute Immature Grans 0.03 0.00 - 0.20 Thousand/uL    Absolute Lymphocytes 0.86 0.60 - 4.47 Thousands/µL    Absolute Monocytes 0.42 0.17 - 1.22 Thousand/µL    Eosinophils Absolute 0.02 0.00 - 0.61 Thousand/µL    Basophils Absolute 0.03 0.00 - 0.10 Thousands/µL   Comprehensive metabolic panel    Collection Time: 07/01/25 10:31 AM   Result Value Ref Range    Sodium 141 135 - 147 mmol/L    Potassium 4.0 3.5 - 5.3 mmol/L    Chloride 101 96 - 108 mmol/L    CO2 26 21 - 32 mmol/L    ANION GAP 14 (H) 4 - 13 mmol/L    BUN 28 (H) 5 - 25 mg/dL    Creatinine 1.32 (H) 0.60 - 1.30 mg/dL    Glucose 73 65 - 140 mg/dL    Calcium 10.2 8.4 - 10.2 mg/dL    AST 18 13 - 39 U/L    ALT 11 7 - 52 U/L    Alkaline Phosphatase 111 (H) 34 - 104 U/L    Total Protein 7.4 6.4 - 8.4 g/dL    Albumin 4.4 3.5 - 5.0 g/dL    Total Bilirubin 1.20 (H) 0.20 - 1.00 mg/dL    eGFR 36 ml/min/1.73sq m   HS Troponin 0hr (reflex protocol)    Collection Time: 07/01/25 10:31 AM   Result Value Ref Range    hs TnI 0hr 38 \"Refer to ACS Flowchart\"- see link ng/L   B-Type Natriuretic Peptide(BNP)    Collection Time: 07/01/25 10:31 AM   Result Value Ref Range     (H) 0 - 100 pg/mL   HS Troponin I 2hr    Collection Time: 07/01/25 12:59 PM   Result Value Ref Range    hs TnI 2hr 36 \"Refer to ACS Flowchart\"- see link ng/L    Delta 2hr hsTnI -2 <20 ng/L   UA w Reflex to Microscopic w Reflex to Culture    Collection Time: 07/01/25 12:59 PM    Specimen: Urine, Clean Catch   Result Value Ref Range    Color, UA Colorless     Clarity, UA Clear     Specific Gravity, UA 1.005 1.003 - " 1.030    pH, UA 6.5 4.5, 5.0, 5.5, 6.0, 6.5, 7.0, 7.5, 8.0    Leukocytes, UA Negative Negative    Nitrite, UA Negative Negative    Protein, UA Negative Negative mg/dl    Glucose,  (3/10%) (A) Negative mg/dl    Ketones, UA Negative Negative mg/dl    Urobilinogen, UA <2.0 <2.0 mg/dl mg/dl    Bilirubin, UA Negative Negative    Occult Blood, UA Negative Negative   Fingerstick Glucose (POCT)    Collection Time: 07/01/25  2:16 PM   Result Value Ref Range    POC Glucose 114 65 - 140 mg/dl       Assessment/Plan:    Benign essential hypertension  Managed in conjunction with nephrology.  Remains on spironolactone 12.5 mg once daily, Bumex 1 mg daily    Chronic diastolic (congestive) heart failure (HCC)  Wt Readings from Last 3 Encounters:   07/11/25 65.3 kg (144 lb)   07/01/25 66.2 kg (145 lb 15.1 oz)   06/10/25 67.6 kg (149 lb)     Seems to be euvolemic.  They are performing daily weights.  Managed by cardiology.  Follow-up with cardiology as scheduled          Hypertensive heart and chronic kidney disease with heart failure and stage 1 through stage 4 chronic kidney disease, or chronic kidney disease (HCC)  Wt Readings from Last 3 Encounters:   07/11/25 65.3 kg (144 lb)   07/01/25 66.2 kg (145 lb 15.1 oz)   06/10/25 67.6 kg (149 lb)         Based upon age.  Control blood pressure.  Remains on losartan.  Follow-up with nephrology as scheduled in October      Persistent atrial fibrillation (HCC)  Remains on metoprolol and Eliquis for anticoagulation.  These are prescribed by cardiology.  Follow-up with cardiology as scheduled    Hypothyroidism  Remains on levothyroxine 88 mcg once daily.  She is due for repeat TSH testing.  She has been stable on this dose of levothyroxine.  Should have had blood work done for this appointment    Chronic gout without tophus  No recent gouty flares.  She is due for repeat uric acid level.  She is on diuretics including Bumex and spironolactone which can worsen hyperuricemia but  thankfully she has not had any flares    Uses walker  Certainly does    Hyperlipemia  Due for repeat lipid panel.  Remains on atorvastatin 40 mg once daily.    Prediabetes  Should have had a hemoglobin A1c done for this office visit.  She is on Jardiance for her history of heart failure.  Will complete hemoglobin A1c        Problem List Items Addressed This Visit        Cardiovascular and Mediastinum    Benign essential hypertension    Managed in conjunction with nephrology.  Remains on spironolactone 12.5 mg once daily, Bumex 1 mg daily         Chronic diastolic (congestive) heart failure (HCC)    Wt Readings from Last 3 Encounters:   07/11/25 65.3 kg (144 lb)   07/01/25 66.2 kg (145 lb 15.1 oz)   06/10/25 67.6 kg (149 lb)     Seems to be euvolemic.  They are performing daily weights.  Managed by cardiology.  Follow-up with cardiology as scheduled               Hypertensive heart and chronic kidney disease with heart failure and stage 1 through stage 4 chronic kidney disease, or chronic kidney disease (HCC)    Wt Readings from Last 3 Encounters:   07/11/25 65.3 kg (144 lb)   07/01/25 66.2 kg (145 lb 15.1 oz)   06/10/25 67.6 kg (149 lb)         Based upon age.  Control blood pressure.  Remains on losartan.  Follow-up with nephrology as scheduled in October           Persistent atrial fibrillation (HCC)    Remains on metoprolol and Eliquis for anticoagulation.  These are prescribed by cardiology.  Follow-up with cardiology as scheduled         Relevant Medications    pantoprazole (PROTONIX) 20 mg tablet       Endocrine    Hypothyroidism (Chronic)    Remains on levothyroxine 88 mcg once daily.  She is due for repeat TSH testing.  She has been stable on this dose of levothyroxine.  Should have had blood work done for this appointment         Relevant Medications    levothyroxine 88 mcg tablet       Orthopedic/Musculoskeletal    Chronic gout without tophus    No recent gouty flares.  She is due for repeat uric acid  level.  She is on diuretics including Bumex and spironolactone which can worsen hyperuricemia but thankfully she has not had any flares            Neurology/Sleep    Uses walker    Certainly does            Other    Hyperlipemia    Due for repeat lipid panel.  Remains on atorvastatin 40 mg once daily.         Relevant Medications    atorvastatin (LIPITOR) 40 mg tablet    Hyperuricemia    Prediabetes - Primary    Should have had a hemoglobin A1c done for this office visit.  She is on Jardiance for her history of heart failure.  Will complete hemoglobin A1c        Other Visit Diagnoses       PAC (premature atrial contraction)        Relevant Medications    pantoprazole (PROTONIX) 20 mg tablet      Paroxysmal atrial fibrillation (HCC)        Relevant Medications    pantoprazole (PROTONIX) 20 mg tablet                 [1]  Past Medical History:  Diagnosis Date   • Anxiety    • Arthritis     joints   • Bunion    • Cataract    • Disease of thyroid gland    • Eczema    • GERD (gastroesophageal reflux disease)    • Gout    • Heart failure (HCC)    • Hepatitis    • Hiatal hernia    • Hypertension    • Intentional overdose (HCC) 2024   • Nephrolithiasis 2017   • Onychomycosis     last assessed: 16   • Orthopnea     resolved: 16   • Pseudogout of left wrist    • Sleep apnea     wears CPAP   • SOB (shortness of breath) on exertion 10/26/2018   • Stroke (HCC)    [2]  Family History  Problem Relation Name Age of Onset   • Diabetes Mother Christine    • Coronary artery disease Mother Christine    • Arthritis Mother Christine    • Hypertension Mother Christine    • Hypertension Father Rupert    • Diabetes Brother Hayden    • Diabetes Son     • Arthritis Family     [3]  Past Surgical History:  Procedure Laterality Date   • ABDOMINAL SURGERY         • BRAIN HEMATOMA EVACUATION Right 2020    Procedure: Right decompressive craniectomy and evacuation of intraparenchymal hematoma;  Surgeon: Apolinar Herrera MD;   Location: [facility];  Service: Neurosurgery   • BREAST SURGERY Right     cyst removal   • CARPAL TUNNEL RELEASE Left    • CARPAL TUNNEL RELEASE Right    • CATARACT EXTRACTION     •  SECTION  1960    x1   • COLONOSCOPY N/A 2018    Procedure: COLONOSCOPY;  Surgeon: Alejandro Carlson MD;  Location: [facility];  Service: Gastroenterology   • DILATION AND CURETTAGE OF UTERUS     • EYE SURGERY Left     cataracts   • HERNIA REPAIR      umbilical hernia   • INCISIONAL HERNIA REPAIR      incarcerated   • KNEE ARTHROSCOPY Right    • LA RPLCMT BONE FLAP/PROSTHETIC PLATE SKULL Right 2020    Procedure: right frontotemporal replacement cranioplasty;  Surgeon: Apolinar Herrera MD;  Location: [facility];  Service: Neurosurgery   • LA XCAPSL CTRC RMVL INSJ IO LENS PROSTH W/O ECP Right 3/27/2017    Procedure: EXTRACTION EXTRACAPSULAR CATARACT PHACO INTRAOCULAR LENS (IOL);  Surgeon: Trip Gilbert MD;  Location: [facility];  Service: Ophthalmology   [4]    Current Outpatient Medications:   •  apixaban (Eliquis) 2.5 mg, TAKE 1 TABLET TWICE DAILY, Disp: 180 tablet, Rfl: 1  •  atorvastatin (LIPITOR) 40 mg tablet, Take 1 tablet (40 mg total) by mouth every evening, Disp: 90 tablet, Rfl: 1  •  bumetanide (BUMEX) 1 mg tablet, TAKE 1 TABLET EVERY DAY (NEW DOSE), Disp: 90 tablet, Rfl: 1  •  Cholecalciferol (Vitamin D3) 50 MCG CAPS, Take by mouth in the morning, Disp: , Rfl:   •  Diclofenac Sodium (VOLTAREN) 1 %, Apply 2 g topically in the morning APPLY NO MORE THAN 3 DAYS IN A ROW THEN TAKE A BREAK FOR ONE WEEK FROM USE., Disp: 100 g, Rfl: 1  •  Empagliflozin (Jardiance) 10 MG TABS tablet, Take 1 tablet (10 mg total) by mouth every morning, Disp: 90 tablet, Rfl: 1  •  hydrOXYzine HCL (ATARAX) 10 mg tablet, Take 1 tablet (10 mg total) by mouth daily at bedtime as needed (insomnia), Disp: 90 tablet, Rfl: 0  •  levothyroxine 88 mcg tablet, Take 1 tablet (88 mcg total) by mouth daily, Disp: 90 tablet, Rfl: 1  •   losartan (COZAAR) 25 mg tablet, Take 1 tablet (25 mg total) by mouth daily, Disp: , Rfl:   •  Magnesium 400 MG CAPS, Take by mouth in the morning Take 500 mg. BID, Disp: , Rfl:   •  metoprolol tartrate (LOPRESSOR) 25 mg tablet, Take 0.5 tablets (12.5 mg total) by mouth every 12 (twelve) hours, Disp: 90 tablet, Rfl: 1  •  pantoprazole (PROTONIX) 20 mg tablet, Take 1 tablet (20 mg total) by mouth daily, Disp: 90 tablet, Rfl: 1  •  spironolactone (ALDACTONE) 25 mg tablet, TAKE 1/2 TABLET EVERY DAY, Disp: 45 tablet, Rfl: 1  •  clotrimazole-betamethasone (LOTRISONE) 1-0.05 % cream, Apply topically 2 (two) times a day for 25 days, Disp: 45 g, Rfl: 1    Current Facility-Administered Medications:   •  triamcinolone acetonide (KENALOG-40) 40 mg/mL injection 20 mg, 20 mg, Infiltration, , , 20 mg at 10/03/24 1045  •  triamcinolone acetonide (Kenalog-40) 40 mg/mL injection 20 mg, 20 mg, Intra-articular, , , 20 mg at 12/12/24 1115

## 2025-07-11 NOTE — ASSESSMENT & PLAN NOTE
Managed in conjunction with nephrology.  Remains on spironolactone 12.5 mg once daily, Bumex 1 mg daily

## 2025-07-11 NOTE — ASSESSMENT & PLAN NOTE
Remains on levothyroxine 88 mcg once daily.  She is due for repeat TSH testing.  She has been stable on this dose of levothyroxine.  Should have had blood work done for this appointment

## 2025-07-11 NOTE — ASSESSMENT & PLAN NOTE
Wt Readings from Last 3 Encounters:   07/11/25 65.3 kg (144 lb)   07/01/25 66.2 kg (145 lb 15.1 oz)   06/10/25 67.6 kg (149 lb)     Seems to be euvolemic.  They are performing daily weights.  Managed by cardiology.  Follow-up with cardiology as scheduled

## 2025-07-11 NOTE — ASSESSMENT & PLAN NOTE
Wt Readings from Last 3 Encounters:   07/11/25 65.3 kg (144 lb)   07/01/25 66.2 kg (145 lb 15.1 oz)   06/10/25 67.6 kg (149 lb)         Based upon age.  Control blood pressure.  Remains on losartan.  Follow-up with nephrology as scheduled in October

## 2025-07-11 NOTE — ASSESSMENT & PLAN NOTE
Should have had a hemoglobin A1c done for this office visit.  She is on Jardiance for her history of heart failure.  Will complete hemoglobin A1c

## 2025-07-11 NOTE — ASSESSMENT & PLAN NOTE
No recent gouty flares.  She is due for repeat uric acid level.  She is on diuretics including Bumex and spironolactone which can worsen hyperuricemia but thankfully she has not had any flares

## 2025-08-02 DIAGNOSIS — I34.0 MODERATE TO SEVERE MITRAL REGURGITATION: ICD-10-CM

## 2025-08-02 DIAGNOSIS — I48.0 PAROXYSMAL ATRIAL FIBRILLATION (HCC): ICD-10-CM

## 2025-08-02 DIAGNOSIS — R06.02 EXERTIONAL SHORTNESS OF BREATH: ICD-10-CM

## 2025-08-02 DIAGNOSIS — Z79.01 CURRENT USE OF LONG TERM ANTICOAGULATION: ICD-10-CM

## 2025-08-02 DIAGNOSIS — I49.1 PAC (PREMATURE ATRIAL CONTRACTION): ICD-10-CM

## 2025-08-02 DIAGNOSIS — I50.32 CHRONIC DIASTOLIC (CONGESTIVE) HEART FAILURE (HCC): ICD-10-CM

## 2025-08-04 RX ORDER — EMPAGLIFLOZIN 10 MG/1
10 TABLET, FILM COATED ORAL EVERY MORNING
Qty: 90 TABLET | Refills: 1 | Status: SHIPPED | OUTPATIENT
Start: 2025-08-04

## 2025-08-04 RX ORDER — METOPROLOL TARTRATE 25 MG/1
12.5 TABLET, FILM COATED ORAL EVERY 12 HOURS
Qty: 90 TABLET | Refills: 1 | Status: SHIPPED | OUTPATIENT
Start: 2025-08-04

## (undated) DEVICE — PACK CRANIOTOMY PBDS RF

## (undated) DEVICE — SUT NUROLON 4-0 TF CR/8 18 IN C584D

## (undated) DEVICE — DRAPE SHEET THREE QUARTER

## (undated) DEVICE — GLOVE SRG BIOGEL 7.5

## (undated) DEVICE — AIRLIFE™  ADULT CUSHION NASAL CANNULA WITH 7 FOOT (2.1 M) CRUSH-RESISTANT OXYGEN TUBING, AND U/CONNECT-IT ADAPTER: Brand: AIRLIFE™

## (undated) DEVICE — TOOL F2/8TA23 LEGEND 8CM 2.3MM TAPER: Brand: MIDAS REX™

## (undated) DEVICE — TIBURON SPLIT SHEET: Brand: CONVERTORS

## (undated) DEVICE — SUT MONOCRYL 2-0 SH 27 IN Y417H

## (undated) DEVICE — 60 ML SYRINGE,REGULAR TIP: Brand: MONOJECT

## (undated) DEVICE — Device

## (undated) DEVICE — RANEY SCALP CLIP STERILE: Brand: AESCULAP

## (undated) DEVICE — 45° KELMAN®, 0.9 MM TURBOSONICS® MINI-FLARED ABS® TIP: Brand: ALCON, KELMAN, TURBOSONICS, MINI-FLARED ABS

## (undated) DEVICE — 0.9MM MICROSMOOTH NULTRA INFUSION SLEEVE KIT: Brand: INFINIT, MICROSMOOTH, ALCON

## (undated) DEVICE — TUBING BUBBLE CLEAR 5MM X 100 FT NS

## (undated) DEVICE — PROXIMATE PLUS MD MULTI-DIRECTIONAL RELEASE SKIN STAPLERS CONTAINS 35 STAINLESS STEEL STAPLES APPROXIMATE CLOSED DIMENSIONS: 6.9MM X 3.9MM WIDE: Brand: PROXIMATE

## (undated) DEVICE — LIGHT HANDLE COVER SLEEVE DISP BLUE STELLAR

## (undated) DEVICE — GLOVE INDICATOR PI UNDERGLOVE SZ 8.5 BLUE

## (undated) DEVICE — THE MONARCH® "D" CARTRIDGE IS A SINGLE-USE POLYPROPYLENE CARTRIDGE FOR POSTERIOR CHAMBER IOL DELIVERY: Brand: MONARCH® III

## (undated) DEVICE — "MH-438 A/W VLVE F/140 EVIS-140": Brand: AIR/WATER VALVE

## (undated) DEVICE — DISPOSABLE BIOPSY VALVE MAJ-1555: Brand: SINGLE USE BIOPSY VALVE (STERILE)

## (undated) DEVICE — EYE PACK CUSTOM -FINNEGAN

## (undated) DEVICE — SPONGE PVP SCRUB WING STERILE

## (undated) DEVICE — FLOSEAL HEMOSTATIC MATRIX, 5 ML: Brand: FLOSEAL

## (undated) DEVICE — LUBRICANT SURGILUBE TUBE 4 OZ  FLIP TOP

## (undated) DEVICE — HEMOSTATIC MATRIX SURGIFLO 8ML W/THROMBIN

## (undated) DEVICE — SOLIDIFIER FLUID WASTE CONTROL 1500ML

## (undated) DEVICE — 1200CC GUARDIAN II: Brand: GUARDIAN

## (undated) DEVICE — PREP SURGICAL PURPREP 26ML

## (undated) DEVICE — ICP CATH BACTISEAL EVD LRG 1.9MM X 35CM

## (undated) DEVICE — DRAPE INTESTINAL ISOLATION BAG

## (undated) DEVICE — CLEARCUT® SLIT KNIFE INTREPID MICRO-COAXIAL SYSTEM 2.4 SB: Brand: CLEARCUT®; INTREPID

## (undated) DEVICE — MEDI-VAC YANKAUER SUCTION HANDLE: Brand: CARDINAL HEALTH

## (undated) DEVICE — AIR INJECT CANNULA 27GA: Brand: OPHTHALMIC CANNULA

## (undated) DEVICE — 3M™ STERI-STRIP™ REINFORCED ADHESIVE SKIN CLOSURES, R1547, 1/2 IN X 4 IN (12 MM X 100 MM), 6 STRIPS/ENVELOPE: Brand: 3M™ STERI-STRIP™

## (undated) DEVICE — PAD GROUNDING ADULT

## (undated) DEVICE — INTENDED FOR TISSUE SEPARATION, AND OTHER PROCEDURES THAT REQUIRE A SHARP SURGICAL BLADE TO PUNCTURE OR CUT.: Brand: BARD-PARKER ® CARBON RIB-BACK BLADES

## (undated) DEVICE — BASIC ULTRASOUND: Brand: ALCON, INFINITI

## (undated) DEVICE — Device: Brand: IQ SYSTEM

## (undated) DEVICE — GAUZE SPONGES,16 PLY: Brand: CURITY

## (undated) DEVICE — TOOL 9MH30 LEGEND 9CM 3MM MH: Brand: MIDAS REX

## (undated) DEVICE — DURASEAL 5ML

## (undated) DEVICE — SURGICEL 4 X 8

## (undated) DEVICE — SINGLE-USE BIOPSY FORCEPS: Brand: RADIAL JAW 4

## (undated) DEVICE — DISPOSABLE SLIM BIPOLAR FORCEPS, NON-STICK,: Brand: SPETZLER-MALIS

## (undated) DEVICE — INTENDED FOR TISSUE SEPARATION, AND OTHER PROCEDURES THAT REQUIRE A SHARP SURGICAL BLADE TO PUNCTURE OR CUT.: Brand: BARD-PARKER SAFETY BLADES SIZE 15, STERILE

## (undated) DEVICE — 3M™ IOBAN™ 2 ANTIMICROBIAL INCISE DRAPE 6650EZ: Brand: IOBAN™ 2

## (undated) DEVICE — TELFA NON-ADHERENT ABSORBENT DRESSING: Brand: TELFA

## (undated) DEVICE — ANTIBACTERIAL VIOLET BRAIDED (POLYGLACTIN 910), SYNTHETIC ABSORBABLE SUTURE: Brand: COATED VICRYL

## (undated) DEVICE — GLOVE SRG BIOGEL 8

## (undated) DEVICE — B-H IRRIGATING CAN 19GA FLAT ANGLED 8MM: Brand: OPHTHALMIC CANNULA

## (undated) DEVICE — GLOVE EXAM NON-STRL NTRL PLUS LRG PF

## (undated) DEVICE — ADHESIVE SKIN HIGH VISCOSITY EXOFIN 1ML

## (undated) DEVICE — BRUSH ENDO CLEANING DBL-HEADER

## (undated) DEVICE — TELFA 1/2" X 3": Brand: TELFA

## (undated) DEVICE — "MAJ-901 WATER CONTAINER SET CV-160/140": Brand: WATER CONTAINER

## (undated) DEVICE — "MH-443 SUCTION VALVE F/EVIS140 EVIS160": Brand: SUCTION VALVE

## (undated) DEVICE — TUBING AUX CHANNEL